# Patient Record
Sex: FEMALE | Race: WHITE | Employment: OTHER | URBAN - METROPOLITAN AREA
[De-identification: names, ages, dates, MRNs, and addresses within clinical notes are randomized per-mention and may not be internally consistent; named-entity substitution may affect disease eponyms.]

---

## 2017-01-02 ENCOUNTER — ALLIED HEALTH/NURSE VISIT (OUTPATIENT)
Dept: SURGERY | Facility: CLINIC | Age: 72
End: 2017-01-02

## 2017-01-02 ENCOUNTER — OFFICE VISIT (OUTPATIENT)
Dept: SURGERY | Facility: CLINIC | Age: 72
End: 2017-01-02

## 2017-01-02 ENCOUNTER — ANESTHESIA EVENT (OUTPATIENT)
Dept: SURGERY | Facility: CLINIC | Age: 72
DRG: 326 | End: 2017-01-02
Payer: MEDICARE

## 2017-01-02 VITALS
RESPIRATION RATE: 12 BRPM | HEIGHT: 60 IN | HEART RATE: 72 BPM | BODY MASS INDEX: 19.67 KG/M2 | OXYGEN SATURATION: 96 % | TEMPERATURE: 98.3 F | SYSTOLIC BLOOD PRESSURE: 113 MMHG | DIASTOLIC BLOOD PRESSURE: 72 MMHG | WEIGHT: 100.2 LBS

## 2017-01-02 DIAGNOSIS — Z01.818 PREOPERATIVE EXAMINATION: ICD-10-CM

## 2017-01-02 DIAGNOSIS — Z01.818 PREOPERATIVE EXAMINATION: Primary | ICD-10-CM

## 2017-01-02 DIAGNOSIS — K22.3 ESOPHAGEAL PERFORATION: ICD-10-CM

## 2017-01-02 LAB
ALBUMIN SERPL-MCNC: 3.9 G/DL (ref 3.4–5)
ANION GAP SERPL CALCULATED.3IONS-SCNC: 5 MMOL/L (ref 3–14)
BASOPHILS # BLD AUTO: 0.1 10E9/L (ref 0–0.2)
BASOPHILS NFR BLD AUTO: 0.4 %
BUN SERPL-MCNC: 24 MG/DL (ref 7–30)
CALCIUM SERPL-MCNC: 9.4 MG/DL (ref 8.5–10.1)
CHLORIDE SERPL-SCNC: 100 MMOL/L (ref 94–109)
CO2 SERPL-SCNC: 31 MMOL/L (ref 20–32)
CREAT SERPL-MCNC: 0.53 MG/DL (ref 0.52–1.04)
DIFFERENTIAL METHOD BLD: ABNORMAL
EOSINOPHIL # BLD AUTO: 0.2 10E9/L (ref 0–0.7)
EOSINOPHIL NFR BLD AUTO: 2 %
ERYTHROCYTE [DISTWIDTH] IN BLOOD BY AUTOMATED COUNT: 14.9 % (ref 10–15)
GFR SERPL CREATININE-BSD FRML MDRD: NORMAL ML/MIN/1.7M2
GLUCOSE SERPL-MCNC: 92 MG/DL (ref 70–99)
HCT VFR BLD AUTO: 37.8 % (ref 35–47)
HGB BLD-MCNC: 12.1 G/DL (ref 11.7–15.7)
IMM GRANULOCYTES # BLD: 0 10E9/L (ref 0–0.4)
IMM GRANULOCYTES NFR BLD: 0.3 %
LYMPHOCYTES # BLD AUTO: 1.5 10E9/L (ref 0.8–5.3)
LYMPHOCYTES NFR BLD AUTO: 13.2 %
MCH RBC QN AUTO: 29.2 PG (ref 26.5–33)
MCHC RBC AUTO-ENTMCNC: 32 G/DL (ref 31.5–36.5)
MCV RBC AUTO: 91 FL (ref 78–100)
MONOCYTES # BLD AUTO: 0.9 10E9/L (ref 0–1.3)
MONOCYTES NFR BLD AUTO: 7.6 %
NEUTROPHILS # BLD AUTO: 8.9 10E9/L (ref 1.6–8.3)
NEUTROPHILS NFR BLD AUTO: 76.5 %
NRBC # BLD AUTO: 0 10*3/UL
NRBC BLD AUTO-RTO: 0 /100
PLATELET # BLD AUTO: 433 10E9/L (ref 150–450)
POTASSIUM SERPL-SCNC: 3.6 MMOL/L (ref 3.4–5.3)
PREALB SERPL IA-MCNC: 22 MG/DL (ref 15–45)
RBC # BLD AUTO: 4.15 10E12/L (ref 3.8–5.2)
SODIUM SERPL-SCNC: 136 MMOL/L (ref 133–144)
WBC # BLD AUTO: 11.6 10E9/L (ref 4–11)

## 2017-01-02 RX ORDER — DIPHENHYDRAMINE HCL 25 MG
50 CAPSULE ORAL
Status: ON HOLD | COMMUNITY
End: 2017-01-25

## 2017-01-02 RX ORDER — NUT.TX.COMP. IMMUNE SYSTM,REG 0.09 G-1.5
LIQUID (ML) ORAL AT BEDTIME
Status: ON HOLD | COMMUNITY
End: 2017-01-25

## 2017-01-02 RX ORDER — CELECOXIB 100 MG/1
200 CAPSULE ORAL ONCE
Status: CANCELLED | OUTPATIENT
Start: 2017-01-02 | End: 2017-01-02

## 2017-01-02 RX ORDER — ACETAMINOPHEN 325 MG/1
975 TABLET ORAL ONCE
Status: CANCELLED | OUTPATIENT
Start: 2017-01-02 | End: 2017-01-02

## 2017-01-02 RX ORDER — IBUPROFEN 100 MG/5ML
10 SUSPENSION, ORAL (FINAL DOSE FORM) ORAL EVERY 6 HOURS PRN
Status: ON HOLD | COMMUNITY
End: 2017-01-25

## 2017-01-02 RX ORDER — GABAPENTIN 300 MG/1
300 CAPSULE ORAL ONCE
Status: CANCELLED | OUTPATIENT
Start: 2017-01-02 | End: 2017-01-02

## 2017-01-02 ASSESSMENT — LIFESTYLE VARIABLES: TOBACCO_USE: 1

## 2017-01-02 NOTE — PATIENT INSTRUCTIONS
Preparing for Your Surgery      Name:  Minerva Blanco   MRN:  [id number]   :  1945   Today's Date:  2017     Arriving for surgery:  Surgery date:  2017  Surgery time: 730 am  Arrival time:  530 am    Please come to:       [facility] [address]  [address] [place]  [address]    -   parking is available in front of the hospital from 5:15 am to 8:00 pm    -  Stop at the Information Desk in the lobby    -   Inform the information person that you are here for surgery. An escort to 3c will be provided. If you would not like an escort, please proceed to 3C on the 3rd floor. [phone]     What can I eat or drink?  -  You may have solid food or milk products until 8 hours prior to your surgery; until 12 midnight on ; drink 8-12 ounces of gatorade the evening of   -  You may have water, apple juice or 7up/Sprite until 2 hours prior to your surgery; until 515 am on ; please drink another 8-12 ounces of gatorade on the way to the hospital on     Which medicines can I take?        -  Please take only these medications the day of surgery:  Pain meds if needed, pantoprazole      How do I prepare myself?  -  Take two showers: one the night before surgery; and one the morning of surgery.         Use Scrubcare or Hibiclens to wash from neck down.  You may use your own shampoo and conditioner. No other hair products.   -  Do NOT use lotion, powder, deodorant, or antiperspirant the day of your surgery.  -  Do NOT wear any makeup, fingernail polish or jewelry.  -  Begin using Incentive Spirometer 1 week prior to surgery.  Use 4 times per day, up to 5-10 breaths each time.  Bring Incentive Spirometer to hospital.  -Do not bring your own medications to the hospital, except for inhalers and eye drops.  -  Bring your ID and insurance card.    Questions or Concerns:  If you have questions or concerns, please call the  Preoperative Assessment Center,  Monday-Friday 7AM-7PM:  [phone]    AFTER YOUR SURGERY  Breathing exercises   Breathing exercises help you recover faster. Take deep breaths and let the air out slowly. This will:     Help you wake up after surgery.    Help prevent complications like pneumonia.  Preventing complications will help you go home sooner.   We may give you a breathing device (incentive spirometer) to encourage you to breathe deeply.   Nausea and vomiting   You may feel sick to your stomach after surgery; if so, let your nurse know.    Pain control:  After surgery, you may have pain. Our goal is to help you manage your pain. Pain medicine will help you feel comfortable enough to do activities that will help you heal.  These activities may include breathing exercises, walking and physical therapy.   To help your health care team treat your pain we will ask: 1) If you have pain  2) where it is located 3) describe your pain in your words  Methods of pain control include medications given by mouth, vein or by nerve block for some surgeries.  We may give you a pain control pump that will:  1) Deliver the medicine through a tube placed in your vein  2) Control the amount of medicine you receive  3) Allow you to push a button to deliver a dose of pain medicine  Sequential Compression Device (SCD) or Pneumo Boots:  You may need to wear SCD S on your legs or feet. These are wraps connected to a machine that pumps in air and releases it. The repeated pumping helps prevent blood clots from forming.   Enhanced Recovery After Surgery     This is a team effort, including you, to get you back on your feet, eating and drinking normally and out of the hospital as quickly as possible.  The goals are: 1) NO INFECTIONS and   2) RETURN TO NORMAL DIET    How can we achieve these goals?  1) STAY ACTIVE: Walk every day before your surgery; try to increase the amount every day.  Walk after surgery as much as you can-the nurses will help you.  Walking speeds  healing and gets you home quicker, you heal better at home and have less risk of infection.     2) INCENTIVE SPIROMETER: Practice your incentive spirometer 4 times per day with 5-10 repetitions each time.  Using the incentive spirometer can strengthen your muscles between your ribs and help you have a strong cough after surgery.  A more effective cough can help prevent problems with your lungs.    3) STAY HYDRATED: Drink clear liquids up until 2 hours before your surgery. We would like you to purchase a drink such as Gatorade.  Drink this before bedtime and on the way into the hospital, drink between 8-12 ounces or until you feel hydrated.  Keeping well hydrated leads to your veins being plump, you wake up faster, and you are less likely to be nauseated. Start drinking water as soon as you can after surgery and advance to clear liquids and food as tolerated.  IV fluids contain salt, drinking fluids will minimize the amount of IV fluids you need and decrease the amount of salt you get.     4) PAIN MANAGEMENT: If we minimize the amount of opioids and narcotics, and use regional blocks (which numb the area where your surgery is) along with oral pain medications; you will have less side effects of nausea and constipation. Narcotics can slow down your bowels and cause you to stay in the hospital longer.     Our goal is to keep you comfortable; eating and drinking normally and back home safely.     Using an Incentive Spirometer  Soon after your surgery, a nurse or therapist will teach you breathing exercises. These keep your lungs clear, strengthen your breathing muscles, and help prevent complications.  The exercises include doing a deep-breathing exercise using a device called an incentive spirometer.  To do these exercises, you will breathe in through your mouth and not your nose. The incentive spirometer only works correctly if you breathe in through your mouth.  Four steps to clear lungs     Deep breathing expands  the lungs, aids circulation, and helps prevent pneumonia.   1. Exhale normally.    Relax and breathe out.  2. Place your lips tightly around the mouthpiece.    Make sure the device is upright and not tilted.  3. Inhale as much air as you can through the mouthpiece (don't breath through your nose).    Inhale slowly and deeply.    Hold your breath long enough to keep the balls or disk raised for at least 3 seconds.    If you re inhaling too quickly, your device may make a tone. If you hear this tone, inhale more slowly.  4. Repeat the exercise regularly.    Do this exercise every hour while you're awake, or as your health care provider instructs.    You will also be taught coughing exercises and be asked to do them regularly on your own.    7666-5520 [employer]. [address] [place] [address], [place] [address]. All rights reserved. This information is not intended as a substitute for professional medical care. Always follow your healthcare professional's instructions.

## 2017-01-02 NOTE — ANESTHESIA PREPROCEDURE EVALUATION
Anesthesia Evaluation     . Pt has had prior anesthetic. Type: General and MAC    No history of anesthetic complications     ROS/MED HX    ENT/Pulmonary:     (+)tobacco use, Current use 1 packs/day  , . .    Neurologic:     (+)Multiple Sclerosis limitations: heat and noise sensitivity,     Cardiovascular:     (+) ----. : . . . :. . Previous cardiac testing date:results:date: results:ECG reviewed date:8/25/2016 results:NSR date: results:          METS/Exercise Tolerance:  3 - Able to walk 1-2 blocks without stopping   Hematologic:     (+) Anemia, History of Transfusion no previous transfusion reaction -     (-) history of blood clots   Musculoskeletal: Comment: Fibromyalgia - generalized body aches  (+) arthritis, , , -       GI/Hepatic:     (+) GERD (asymptomatic currently, stopped protonix 3 months ago) Other, hiatal hernia, Inflammatory bowel disease, Other GI/Hepatic chronic esophageal perforation      Renal/Genitourinary:  - ROS Renal section negative       Endo:  - neg endo ROS       Psychiatric:  - neg psychiatric ROS       Infectious Disease:  - neg infectious disease ROS       Malignancy:   (+) Malignancy History of Breast  Breast CA Remission status post Surgery, Chemo and Radiation.         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  No vision impairment now.   (+) H/O Chronic Pain,H/O chronic opiod use ,              Physical Exam  Normal systems: cardiovascular and pulmonary    Airway   Mallampati: II  TM distance: >3 FB  Neck ROM: full    Dental   Comment: 2 missing left lower teeth    Cardiovascular   Rhythm and rate: regular and normal      Pulmonary    breath sounds clear to auscultation    Other findings: LUQ GJ tube  LUQ abdominal drain  Left pupil pinpoint s/p optic neuritis several years ago           PAC Discussion and Assessment    ASA Classification: 3  Case is suitable for:   Anesthetic techniques and relevant risks discussed: GA  Invasive  monitoring and risk discussed: No  Types:   Possibility and Risk of blood transfusion discussed: Yes  NPO instructions given:   Additional anesthetic preparation and risks discussed:   Needs early admission to pre-op area:   Other:     PAC Resident/NP Anesthesia Assessment:  Minerva Blanco is a 71 year old female scheduled for a laparoscopy left thoracotomy, distal esophagectomy, proximal gastrectomy and a spit fistula on 1/9/2017 by Dr. Wise in treatment of a chronic esophageal perforaion.  PAC referral for risk assessment and optimization for anesthesia with comorbid conditions of:  anemia, multiple sclerosis, fibromyalgia, IBS, hiatal hernia, GERD, and history of breast cancer.     Pre-operative considerations:  1.  Cardiac:  Functional status- METS 3. Patient reports some deconditioning over the last year, but is able to walk a couple blocks with no complications.  High risk surgery with 0.9% risk of major adverse cardiac event.   2.  Pulm:  Airway feasible.  CHARLIE risk: low.  Quit smoking in 1998.  3.  GI:  Risk of PONV score = 2.  If > 2, anti-emetic intervention recommended. GERD well controlled with no medication currently as she quit taking her protonix in a few months ago.  Denies chronic abdominal pain.  Has been on tube feedings since April 2016.    4.  Neuro:  +multiple sclerosis, but no current significant limitations.  Monitor for any signs of a MS flare post-op.   5. Musculoskeletal:  +fibromyalgia - reports generalized body aches; no specific medications needed.   6.  Pain:  Has oral oxycodone that is used rarely now.  Was taking it more regularly status post dental extractions a couple weeks ago, but no longer using regularly.   7. ENT:  Patient is deaf in the left ear from Meniere's disease.  Speak towards the right ear.    VTE risk:    Patient is optimized and is acceptable candidate for the proposed procedure.  No further diagnostic evaluation is needed.       Thoracic ERAS pathway  initiated      Patient also evaluated by Dr. Enriquez. See recommendations below.     For further details of assessment, testing, and physical exam please see H and P completed on same date.          Felicia Garcia DNP, RN, APRN        Reviewed and Signed by PAC Mid-Level Provider/Resident  Mid-Level Provider/Resident: Felicia Garcia DNP, RN, APRN  Date: 1/2/2017  Time: 1046    Attending Anesthesiologist Anesthesia Assessment:  STAFF:  I have seen this patient, discussed his preoperative evaluation in detail with the nurse practitioner in PAC, and agree with the comprehensive anesthetic evaluation summarized above.  Instructions given and questions answered.  The final anesthetic plan will be determined by anesthesiology team on DOS.    Anaya Enriquez MD  01/02/2017      Anesthesiologist:   Date:   Time:   Pass/Fail: Pass  Disposition:     PAC Pharmacist Assessment:        Pharmacist:   Date:   Time:      Anesthesia Plan      History & Physical Review      ASA Status:  3 .        Plan for General, ETT and Periph. Nerve Block for postop pain with Intravenous and Propofol induction. Maintenance will be Balanced.    PONV prophylaxis:  Ondansetron (or other 5HT-3) and Dexamethasone or Solumedrol  Additional equipment: Double Lumen ETT, 2nd IV and Arterial Line      Postoperative Care  Postoperative pain management:  Multi-modal analgesia.      Consents                          .

## 2017-01-02 NOTE — MR AVS SNAPSHOT
After Visit Summary   2017    Minerva Blanco    MRN: [id number]           Patient Information     Date Of Birth          1945        Visit Information        Provider Department      2017 9:30 AM Rn, [facility]        Care Instructions    Preparing for Your Surgery      Name:  Minerva Blanco   MRN:  [id number]   :  1945   Today's Date:  2017     Arriving for surgery:  Surgery date:  2017  Surgery time: 730 am  Arrival time:  530 am    Please come to:       [facility] [address]  [address] [place]  [address]    -   parking is available in front of the hospital from 5:15 am to 8:00 pm    -  Stop at the Information Desk in the lobby    -   Inform the information person that you are here for surgery. An escort to 3c will be provided. If you would not like an escort, please proceed to 3C on the 3rd floor. [phone]     What can I eat or drink?  -  You may have solid food or milk products until 8 hours prior to your surgery; until 12 midnight on ; drink 8-12 ounces of gatorade the evening of   -  You may have water, apple juice or 7up/Sprite until 2 hours prior to your surgery; until 515 am on ; please drink another 8-12 ounces of gatorade on the way to the hospital on     Which medicines can I take?        -  Please take only these medications the day of surgery:  Pain meds if needed, pantoprazole      How do I prepare myself?  -  Take two showers: one the night before surgery; and one the morning of surgery.         Use Scrubcare or Hibiclens to wash from neck down.  You may use your own shampoo and conditioner. No other hair products.   -  Do NOT use lotion, powder, deodorant, or antiperspirant the day of your surgery.  -  Do NOT wear any makeup, fingernail polish or jewelry.  -  Begin using Incentive Spirometer 1 week prior to surgery.  Use 4 times per day, up to 5-10  breaths each time.  Bring Incentive Spirometer to hospital.  -Do not bring your own medications to the hospital, except for inhalers and eye drops.  -  Bring your ID and insurance card.    Questions or Concerns:  If you have questions or concerns, please call the  Preoperative Assessment Center, Monday-Friday 7AM-7PM:  [phone]    AFTER YOUR SURGERY  Breathing exercises   Breathing exercises help you recover faster. Take deep breaths and let the air out slowly. This will:     Help you wake up after surgery.    Help prevent complications like pneumonia.  Preventing complications will help you go home sooner.   We may give you a breathing device (incentive spirometer) to encourage you to breathe deeply.   Nausea and vomiting   You may feel sick to your stomach after surgery; if so, let your nurse know.    Pain control:  After surgery, you may have pain. Our goal is to help you manage your pain. Pain medicine will help you feel comfortable enough to do activities that will help you heal.  These activities may include breathing exercises, walking and physical therapy.   To help your health care team treat your pain we will ask: 1) If you have pain  2) where it is located 3) describe your pain in your words  Methods of pain control include medications given by mouth, vein or by nerve block for some surgeries.  We may give you a pain control pump that will:  1) Deliver the medicine through a tube placed in your vein  2) Control the amount of medicine you receive  3) Allow you to push a button to deliver a dose of pain medicine  Sequential Compression Device (SCD) or Pneumo Boots:  You may need to wear SCD S on your legs or feet. These are wraps connected to a machine that pumps in air and releases it. The repeated pumping helps prevent blood clots from forming.   Enhanced Recovery After Surgery     This is a team effort, including you, to get you back on your feet, eating and drinking normally and out of the hospital  as quickly as possible.  The goals are: 1) NO INFECTIONS and   2) RETURN TO NORMAL DIET    How can we achieve these goals?  1) STAY ACTIVE: Walk every day before your surgery; try to increase the amount every day.  Walk after surgery as much as you can-the nurses will help you.  Walking speeds healing and gets you home quicker, you heal better at home and have less risk of infection.     2) INCENTIVE SPIROMETER: Practice your incentive spirometer 4 times per day with 5-10 repetitions each time.  Using the incentive spirometer can strengthen your muscles between your ribs and help you have a strong cough after surgery.  A more effective cough can help prevent problems with your lungs.    3) STAY HYDRATED: Drink clear liquids up until 2 hours before your surgery. We would like you to purchase a drink such as Gatorade.  Drink this before bedtime and on the way into the hospital, drink between 8-12 ounces or until you feel hydrated.  Keeping well hydrated leads to your veins being plump, you wake up faster, and you are less likely to be nauseated. Start drinking water as soon as you can after surgery and advance to clear liquids and food as tolerated.  IV fluids contain salt, drinking fluids will minimize the amount of IV fluids you need and decrease the amount of salt you get.     4) PAIN MANAGEMENT: If we minimize the amount of opioids and narcotics, and use regional blocks (which numb the area where your surgery is) along with oral pain medications; you will have less side effects of nausea and constipation. Narcotics can slow down your bowels and cause you to stay in the hospital longer.     Our goal is to keep you comfortable; eating and drinking normally and back home safely.     Using an Incentive Spirometer  Soon after your surgery, a nurse or therapist will teach you breathing exercises. These keep your lungs clear, strengthen your breathing muscles, and help prevent complications.  The exercises include doing  a deep-breathing exercise using a device called an incentive spirometer.  To do these exercises, you will breathe in through your mouth and not your nose. The incentive spirometer only works correctly if you breathe in through your mouth.  Four steps to clear lungs     Deep breathing expands the lungs, aids circulation, and helps prevent pneumonia.   1. Exhale normally.    Relax and breathe out.  2. Place your lips tightly around the mouthpiece.    Make sure the device is upright and not tilted.  3. Inhale as much air as you can through the mouthpiece (don't breath through your nose).    Inhale slowly and deeply.    Hold your breath long enough to keep the balls or disk raised for at least 3 seconds.    If you re inhaling too quickly, your device may make a tone. If you hear this tone, inhale more slowly.  4. Repeat the exercise regularly.    Do this exercise every hour while you're awake, or as your health care provider instructs.    You will also be taught coughing exercises and be asked to do them regularly on your own.    5508-2272 [employer]. [address] [place] [address]. All rights reserved. This information is not intended as a substitute for professional medical care. Always follow your healthcare professional's instructions.                Follow-ups after your visit        Your next 10 appointments already scheduled     Jan 02, 2017 10:00 AM   (Arrive by 9:45 AM)   PAC EVALUATION with  Pac Sarah 8   [facility] [place] ([facility])    [address] [place] [id number]   [phone]            Jan 02, 2017 11:00 AM   (Arrive by 10:45 AM)   PAC Anesthesia Consult with  Pac Anesthesiologist   [facility] Preoperative Assessment [place] ([facility])    [address] [place] [address]   [phone]            Jan 02, 2017 11:15 AM   LAB with RACHELLE LAB   [facility] Lab  ([facility])    [address] [place] [address]  [address] [facility] [id number] [phone]           Patient must bring picture ID.  Patient should be prepared to give a urine specimen  Please do not eat 10-12 hours before your appointment if you are coming in fasting for labs on lipids, cholesterol, or glucose (sugar).  Pregnant women should follow their Care Team instructions. Water with medications is okay. Do not drink coffee or other fluids.   If you have concerns about taking  your medications, please ask at office or if scheduling via [employer], send a message by clicking on Secure Messaging, Message Your Care Team.            Jan 09, 2017   Procedure with Jens Wise MD   [facility] [place], Same Day Surgery (--)    [address] [facility] [address] [phone]            Mar 06, 2017  2:20 PM   (Arrive by 2:05 PM)   New Patient Visit with Roverto Adams MD   Gastroenterology and IBD ([facility])    [address] [place] [address]  [address] [facility] [id number] [phone]              Future tests that were ordered for you today     Open Future Orders        Priority Expected Expires Ordered    ABO/Rh type and screen Routine 1/2/2017 2/1/2017 1/2/2017    Basic metabolic panel Routine 1/2/2017 2/1/2017 1/2/2017    CBC with platelets differential Routine 1/2/2017 2/1/2017 1/2/2017            Who to contact     Please call your clinic at [phone] to:    Ask questions about your health    Make or cancel appointments    Discuss your medicines    Learn about your test results    Speak to your doctor   If you have compliments or concerns about an experience at your clinic, or if you wish to file a complaint, please contact [facility] Patient Relations at [phone] or email us at [email or web address]         Additional Information About Your Visit        [employer] Information     [employer] is an electronic gateway  that provides easy, online access to your medical records. With Patient Home Monitoring, you can request a clinic appointment, read your test results, renew a prescription or communicate with your care team.     To sign up for Patient Home Monitoring visit the website at [email or web address]   You will be asked to enter the access code listed below, as well as some personal information. Please follow the directions to create your username and password.     Your access code is: [email or web address]  Expires: 2017  6:30 AM     Your access code will  in 90 days. If you need help or a new code, please contact your [facility] or call [phone] for assistance.         Blood Pressure from Last 3 Encounters:   16 139/75   10/20/16 118/70   10/20/16 118/70    Weight from Last 3 Encounters:   16 43.591 kg (96 lb 1.6 oz)   10/20/16 44.9 kg (98 lb 15.8 oz)   10/20/16 44.09 kg (97 lb 3.2 oz)              Today, you had the following     No orders found for display         Today's Medication Changes          These changes are accurate as of: 17  9:45 AM.  If you have any questions, ask your nurse or doctor.               These medicines have changed or have updated prescriptions.        Dose/Directions    zolpidem 5 MG tablet   Commonly known as:  AMBIEN   This may have changed:  how much to take   Used for:  Insomnia due to medical condition        Dose:  5 mg   Take 1 tablet (5 mg) by mouth nightly as needed for sleep   Quantity:  30 tablet   Refills:  0                Primary Care Provider Office Phone # Fax #    Andrea Nino [phone]       [facility] [address] [address]        Thank you!     Thank you for choosing [facility]  for your care. Our goal is always to provide you with excellent care. Hearing back from our patients is one way we can continue to improve our services. Please take a few minutes to complete the  written survey that you may receive in the mail after your visit with us. Thank you!             Your Updated Medication List - Protect others around you: Learn how to safely use, store and throw away your medicines at www.disposemymeds.org.          This list is accurate as of: 1/2/17  9:45 AM.  Always use your most recent med list.                   Brand Name Dispense Instructions for use    BENADRYL 25 MG capsule   Generic drug:  diphenhydrAMINE      Take 50 mg by mouth nightly as needed for itching or allergies       CULTURELLE PO      Take 1 tablet by mouth At Bedtime       ibuprofen 100 MG/5ML suspension    ADVIL/MOTRIN     Take 10 mg/kg by mouth every 6 hours as needed for fever or moderate pain       IMODIUM OR      Take 15 mLs by mouth At Bedtime       IMPACT PEPTIDE 1.5 Liqd      Take by mouth At Bedtime 4-5 CARTONS       oxyCODONE 5 MG/5ML solution    ROXICODONE    1500 mL    Take 15--20mg q 4 hours PRN pain       traZODone 100 MG tablet    DESYREL    30 tablet    Take 1 tablet (100 mg) by mouth At Bedtime       zolpidem 5 MG tablet    AMBIEN    30 tablet    Take 1 tablet (5 mg) by mouth nightly as needed for sleep

## 2017-01-03 DIAGNOSIS — K22.3 ESOPHAGEAL PERFORATION: Primary | ICD-10-CM

## 2017-01-04 ENCOUNTER — RECORDS - HEALTHEAST (OUTPATIENT)
Dept: ADMINISTRATIVE | Facility: OTHER | Age: 72
End: 2017-01-04

## 2017-01-04 ENCOUNTER — OFFICE VISIT (OUTPATIENT)
Dept: SURGERY | Facility: CLINIC | Age: 72
End: 2017-01-04
Attending: THORACIC SURGERY (CARDIOTHORACIC VASCULAR SURGERY)
Payer: MEDICARE

## 2017-01-04 VITALS
OXYGEN SATURATION: 100 % | HEART RATE: 79 BPM | HEIGHT: 60 IN | RESPIRATION RATE: 18 BRPM | WEIGHT: 101.3 LBS | TEMPERATURE: 97.8 F | SYSTOLIC BLOOD PRESSURE: 137 MMHG | BODY MASS INDEX: 19.89 KG/M2 | DIASTOLIC BLOOD PRESSURE: 77 MMHG

## 2017-01-04 DIAGNOSIS — K21.9 GASTROESOPHAGEAL REFLUX DISEASE WITHOUT ESOPHAGITIS: ICD-10-CM

## 2017-01-04 DIAGNOSIS — K22.3 PERFORATION ESOPHAGUS: Primary | ICD-10-CM

## 2017-01-04 DIAGNOSIS — K22.3 ESOPHAGEAL PERFORATION: ICD-10-CM

## 2017-01-04 DIAGNOSIS — K22.2 ESOPHAGEAL STRICTURE: ICD-10-CM

## 2017-01-04 DIAGNOSIS — Z98.890 S/P LAPAROSCOPIC FUNDOPLICATION: ICD-10-CM

## 2017-01-04 PROCEDURE — 99212 OFFICE O/P EST SF 10 MIN: CPT | Mod: ZF

## 2017-01-04 PROCEDURE — 99212 OFFICE O/P EST SF 10 MIN: CPT

## 2017-01-04 ASSESSMENT — PAIN SCALES - GENERAL: PAINLEVEL: NO PAIN (0)

## 2017-01-04 NOTE — Clinical Note
1/4/2017       RE: Minerva Blanco  [address]  [place] [address]     Dear Colleague,    Thank you for referring your patient, Minerva Blanco, to the [facility]. Please see a copy of my visit note below.       THORACIC SURGERY ESTABLISHED PATIENT OFFICE VISIT    Dear Dr. Carroll,  I saw Mrs. Minerva Blanco in follow-up today. The clinical summary follows:      PREOP DIAGNOSIS    GERD  S/P Toupet fundoplication (1/2016)  Chronic esophageal perforation  PROCEDURE   1) Multiple endoscopies and pharyngostomy tube placement x 2 (for drainage of paraesophageal cavity)  2) Esophageal stent placement, attempted placement of biliary stent into the paraesophageal cavity (7/22/2016)  3) Esophageal stent removal, placement of retrograde gastroesophageal tube (10/23/2016)    INTERVAL STUDIES  CTA abdomen 11/23/2016: no cavity but significant periesophageal tissue swelling, retrograde esophageal tube in place.   TOB neg  ETOH neg  BMI 18    SUBJECTIVE      IMPRESSION    (K22.3) Esophageal perforation  (primary encounter diagnosis)  (K22.2) Esophageal stricture    71 year-old female with a chronic esophageal perforation and stricture      PLAN  I spent a total of  *** minutes with Mrs. Minerva Blanco and her , Enrique, more than ***% of which were spent in counseling, coordination of care, and face-to-face time. I reviewed the plan as follows:  1) Procedure planned: distal esophagectomy, proximal gastrectomy, spit fistula (laparoscopy/left thoracotomy). We will plan to restore GI continuity about 3 months afterwards. I reviewed the indications, risks, and benefits of the procedure with Ms. Minerva Blanco and her , Enrique.   2) PAC, social work consult for likely rehabilitation facility stay after surgery  3) EPIDURAL      They had all their questions answered and were in agreement with the plan.  I appreciate the opportunity to participate in the care of your  patient and will keep you updated.               Again, thank you for allowing me to participate in the care of your patient.      Sincerely,    Jens Wise MD

## 2017-01-04 NOTE — Clinical Note
1/4/2017      RE: Minerva Blanco  [address]  [place] [address]       THORACIC SURGERY ESTABLISHED PATIENT OFFICE VISIT    Dear Dr. Carroll,  I saw Mrs. Minerva Blanco in follow-up today. The clinical summary follows:      PREOP DIAGNOSIS    GERD  S/P Toupet fundoplication (1/2016)  Chronic esophageal perforation  Esophageal stricture  PROCEDURE   1) Multiple endoscopies and pharyngostomy tube placement x 2 (for drainage of paraesophageal cavity)  2) Esophageal stent placement, attempted placement of biliary stent into the paraesophageal cavity (7/22/2016)  3) Esophageal stent removal, placement of retrograde gastroesophageal tube (10/23/2016)    INTERVAL STUDIES  CT chest/abdomen/pelvis today showing stable distal esophageal changes  TOB neg  ETOH neg  BMI 19.78    SUBJECTIVE  Patient is overall doing well. She has gained some weight. She reports persistent discomfort and some leaking from her feeding tube.     IMPRESSION    (K22.3) Perforation esophagus  (primary encounter diagnosis)  (K21.9) Gastroesophageal reflux disease without esophagitis  (Z98.890) S/P laparoscopic fundoplication  (K22.2) Esophageal stricture    71 year-old female with a chronic esophageal perforation and stricture  S/P Toupet for GERD      PLAN  I spent a total of  25 minutes with Mrs. Minerva Blanco and her , Enrique, more than 50% of which were spent in counseling, coordination of care, and face-to-face time. I reviewed the plan as follows:  1) Procedure planned: Procedure planned: esophagectomy and spit fistula via laparoscopy possible laparotomy, left video assisted thorascopic surgery possible left thoracotomy, possible right video assisted thorascopic surgery possible right thoracotomy; jejunal feeding tube replacement. I reviewed the indications, risks, and benefits of the procedure with Ms. Minerva Blanco and her . They understand that her gastrointestinal tract will be in  discontinuity at the conclusion of the procedure. We discussed the intraoperative risks of bleeding, injury to vital organs, and potential for esophageal discontinuity.  Potential postoperative complications include, but are not limited to, major respiratory events, arrhythmia, bleeding, infection, reoperation, anastomotic leak, conduit necrosis with discontinuity, vocal cord palsy, and death. I explained the anticipated hospital course (10-14 days) and postoperative recovery including pain control, tube feedings, dietary changes, and overall drain management. We discussed the importance of lifetime awareness to limit lifting heavy weights to prevent hiatal herniation as well as the need for aspiration precautions.   2) PAC, social work consult for likely rehabilitation facility stay after surgery  3) EPIDURAL + pain consult  They had all their questions answered and were in agreement with the plan.  I appreciate the opportunity to participate in the care of your patient and will keep you updated.        Jens Wise MD

## 2017-01-04 NOTE — NURSING NOTE
Minerva Blanco is a 71 year old female who presents for:  Chief Complaint   Patient presents with     Oncology Clinic Visit     Esophageal Perf        Initial Vitals:  /77 mmHg  Pulse 79  Temp(Src) 97.8  F (36.6  C) (Oral)  Resp 18  Ht 1.524 m (5')  Wt 45.949 kg (101 lb 4.8 oz)  BMI 19.78 kg/m2  SpO2 100% Estimated body mass index is 19.78 kg/(m^2) as calculated from the following:    Height as of this encounter: 1.524 m (5').    Weight as of this encounter: 45.949 kg (101 lb 4.8 oz).. Body surface area is 1.39 meters squared. BP completed using cuff size: small regular  No Pain (0) No LMP recorded. Patient is postmenopausal. Allergies and medications reviewed.     Medications: Medication refills not needed today.  Pharmacy name entered into EPIC:    [facility] - [place], [place] - [address] [id number] - [place] - [address] [facility] [address] [place] [facility] - [place], [place] - [address] [place] [address]    Comments: Patient denied pain.    6 minutes for nursing intake (face to face time)   Roxanne Garcia LPN

## 2017-01-04 NOTE — PROGRESS NOTES
THORACIC SURGERY ESTABLISHED PATIENT OFFICE VISIT    Dear Dr. Carroll,  I saw Mrs. Minerva Blanco in follow-up today. The clinical summary follows:      PREOP DIAGNOSIS    GERD  S/P Toupet fundoplication (1/2016)  Chronic esophageal perforation  Esophageal stricture  PROCEDURE   1) Multiple endoscopies and pharyngostomy tube placement x 2 (for drainage of paraesophageal cavity)  2) Esophageal stent placement, attempted placement of biliary stent into the paraesophageal cavity (7/22/2016)  3) Esophageal stent removal, placement of retrograde gastroesophageal tube (10/23/2016)    INTERVAL STUDIES  CT chest/abdomen/pelvis today showing stable distal esophageal changes  TOB neg  ETOH neg  BMI 19.78    SUBJECTIVE  Patient is overall doing well. She has gained some weight. She reports persistent discomfort and some leaking from her feeding tube.     IMPRESSION    (K22.3) Perforation esophagus  (primary encounter diagnosis)  (K21.9) Gastroesophageal reflux disease without esophagitis  (Z98.890) S/P laparoscopic fundoplication  (K22.2) Esophageal stricture    71 year-old female with a chronic esophageal perforation and stricture  S/P Toupet for GERD      PLAN  I spent a total of  25 minutes with Mrs. Minerva Blanco and her , Enrique, more than 50% of which were spent in counseling, coordination of care, and face-to-face time. I reviewed the plan as follows:  1) Procedure planned: Procedure planned: esophagectomy and spit fistula via laparoscopy possible laparotomy, left video assisted thorascopic surgery possible left thoracotomy, possible right video assisted thorascopic surgery possible right thoracotomy; jejunal feeding tube replacement. I reviewed the indications, risks, and benefits of the procedure with Ms. Minerva Blanco and her . They understand that her gastrointestinal tract will be in discontinuity at the conclusion of the procedure. We discussed the intraoperative risks of bleeding, injury  to vital organs, and potential for esophageal discontinuity.  Potential postoperative complications include, but are not limited to, major respiratory events, arrhythmia, bleeding, infection, reoperation, anastomotic leak, conduit necrosis with discontinuity, vocal cord palsy, and death. I explained the anticipated hospital course (10-14 days) and postoperative recovery including pain control, tube feedings, dietary changes, and overall drain management. We discussed the importance of lifetime awareness to limit lifting heavy weights to prevent hiatal herniation as well as the need for aspiration precautions.   2) PAC, social work consult for likely rehabilitation facility stay after surgery  3) EPIDURAL + pain consult  They had all their questions answered and were in agreement with the plan.  I appreciate the opportunity to participate in the care of your patient and will keep you updated.

## 2017-01-09 ENCOUNTER — APPOINTMENT (OUTPATIENT)
Dept: GENERAL RADIOLOGY | Facility: CLINIC | Age: 72
DRG: 326 | End: 2017-01-09
Attending: THORACIC SURGERY (CARDIOTHORACIC VASCULAR SURGERY)
Payer: MEDICARE

## 2017-01-09 ENCOUNTER — ANESTHESIA (OUTPATIENT)
Dept: SURGERY | Facility: CLINIC | Age: 72
DRG: 326 | End: 2017-01-09
Payer: MEDICARE

## 2017-01-09 LAB
BLD PROD TYP BPU: NORMAL
BLD PROD TYP BPU: NORMAL
BLD UNIT ID BPU: 0
BLD UNIT ID BPU: 0
BLOOD PRODUCT CODE: NORMAL
BLOOD PRODUCT CODE: NORMAL
BPU ID: NORMAL
BPU ID: NORMAL
TRANSFUSION STATUS PATIENT QL: NORMAL

## 2017-01-09 PROCEDURE — 25000128 H RX IP 250 OP 636

## 2017-01-09 PROCEDURE — 25000125 ZZHC RX 250: Performed by: ANESTHESIOLOGY

## 2017-01-09 PROCEDURE — 25000125 ZZHC RX 250: Performed by: NURSE ANESTHETIST, CERTIFIED REGISTERED

## 2017-01-09 PROCEDURE — 25000125 ZZHC RX 250

## 2017-01-09 PROCEDURE — 25000125 ZZHC RX 250: Performed by: CLINICAL NURSE SPECIALIST

## 2017-01-09 PROCEDURE — 25800025 ZZH RX 258: Performed by: ANESTHESIOLOGY

## 2017-01-09 PROCEDURE — 25000128 H RX IP 250 OP 636: Performed by: ANESTHESIOLOGY

## 2017-01-09 PROCEDURE — 71010 XR CHEST PORT 1 VW: CPT

## 2017-01-09 PROCEDURE — P9041 ALBUMIN (HUMAN),5%, 50ML: HCPCS | Performed by: ANESTHESIOLOGY

## 2017-01-09 RX ORDER — NEOSTIGMINE METHYLSULFATE 1 MG/ML
VIAL (ML) INJECTION PRN
Status: DISCONTINUED | OUTPATIENT
Start: 2017-01-09 | End: 2017-01-09

## 2017-01-09 RX ORDER — ONDANSETRON 2 MG/ML
INJECTION INTRAMUSCULAR; INTRAVENOUS PRN
Status: DISCONTINUED | OUTPATIENT
Start: 2017-01-09 | End: 2017-01-09

## 2017-01-09 RX ORDER — SODIUM CHLORIDE 9 MG/ML
INJECTION, SOLUTION INTRAVENOUS CONTINUOUS PRN
Status: DISCONTINUED | OUTPATIENT
Start: 2017-01-09 | End: 2017-01-09

## 2017-01-09 RX ORDER — PROPOFOL 10 MG/ML
INJECTION, EMULSION INTRAVENOUS PRN
Status: DISCONTINUED | OUTPATIENT
Start: 2017-01-09 | End: 2017-01-09

## 2017-01-09 RX ORDER — FENTANYL CITRATE 50 UG/ML
INJECTION, SOLUTION INTRAMUSCULAR; INTRAVENOUS PRN
Status: DISCONTINUED | OUTPATIENT
Start: 2017-01-09 | End: 2017-01-09

## 2017-01-09 RX ORDER — ALBUMIN HUMAN 5 %
INTRAVENOUS SOLUTION INTRAVENOUS PRN
Status: DISCONTINUED | OUTPATIENT
Start: 2017-01-09 | End: 2017-01-09

## 2017-01-09 RX ORDER — EPHEDRINE SULFATE 50 MG/ML
INJECTION, SOLUTION INTRAMUSCULAR; INTRAVENOUS; SUBCUTANEOUS PRN
Status: DISCONTINUED | OUTPATIENT
Start: 2017-01-09 | End: 2017-01-09

## 2017-01-09 RX ORDER — SODIUM CHLORIDE, SODIUM LACTATE, POTASSIUM CHLORIDE, CALCIUM CHLORIDE 600; 310; 30; 20 MG/100ML; MG/100ML; MG/100ML; MG/100ML
INJECTION, SOLUTION INTRAVENOUS CONTINUOUS PRN
Status: DISCONTINUED | OUTPATIENT
Start: 2017-01-09 | End: 2017-01-09

## 2017-01-09 RX ORDER — GLYCOPYRROLATE 0.2 MG/ML
INJECTION, SOLUTION INTRAMUSCULAR; INTRAVENOUS PRN
Status: DISCONTINUED | OUTPATIENT
Start: 2017-01-09 | End: 2017-01-09

## 2017-01-09 RX ORDER — LIDOCAINE HYDROCHLORIDE 20 MG/ML
INJECTION, SOLUTION INFILTRATION; PERINEURAL PRN
Status: DISCONTINUED | OUTPATIENT
Start: 2017-01-09 | End: 2017-01-09

## 2017-01-09 RX ADMIN — ROCURONIUM BROMIDE 30 MG: 10 INJECTION INTRAVENOUS at 08:52

## 2017-01-09 RX ADMIN — Medication 5 MG: at 15:27

## 2017-01-09 RX ADMIN — PHENYLEPHRINE HYDROCHLORIDE 100 MCG: 10 INJECTION, SOLUTION INTRAMUSCULAR; INTRAVENOUS; SUBCUTANEOUS at 16:09

## 2017-01-09 RX ADMIN — ONDANSETRON 4 MG: 2 INJECTION INTRAMUSCULAR; INTRAVENOUS at 16:32

## 2017-01-09 RX ADMIN — HYDROMORPHONE HYDROCHLORIDE 0.2 MG: 1 INJECTION, SOLUTION INTRAMUSCULAR; INTRAVENOUS; SUBCUTANEOUS at 16:21

## 2017-01-09 RX ADMIN — CEFAZOLIN 1 G: 1 INJECTION, POWDER, FOR SOLUTION INTRAMUSCULAR; INTRAVENOUS at 16:11

## 2017-01-09 RX ADMIN — HYDROMORPHONE HYDROCHLORIDE 0.3 MG: 1 INJECTION, SOLUTION INTRAMUSCULAR; INTRAVENOUS; SUBCUTANEOUS at 09:06

## 2017-01-09 RX ADMIN — ROCURONIUM BROMIDE 20 MG: 10 INJECTION INTRAVENOUS at 09:28

## 2017-01-09 RX ADMIN — FENTANYL CITRATE 150 MCG: 50 INJECTION, SOLUTION INTRAMUSCULAR; INTRAVENOUS at 07:39

## 2017-01-09 RX ADMIN — CEFAZOLIN SODIUM 2 G: 2 INJECTION, SOLUTION INTRAVENOUS at 08:22

## 2017-01-09 RX ADMIN — ROCURONIUM BROMIDE 20 MG: 10 INJECTION INTRAVENOUS at 12:04

## 2017-01-09 RX ADMIN — ROCURONIUM BROMIDE 50 MG: 10 INJECTION INTRAVENOUS at 07:39

## 2017-01-09 RX ADMIN — PHENYLEPHRINE HYDROCHLORIDE 100 MCG: 10 INJECTION, SOLUTION INTRAMUSCULAR; INTRAVENOUS; SUBCUTANEOUS at 11:01

## 2017-01-09 RX ADMIN — ROCURONIUM BROMIDE 20 MG: 10 INJECTION INTRAVENOUS at 09:50

## 2017-01-09 RX ADMIN — LIDOCAINE HYDROCHLORIDE 100 MG: 20 INJECTION, SOLUTION INFILTRATION; PERINEURAL at 07:39

## 2017-01-09 RX ADMIN — HYDROMORPHONE HYDROCHLORIDE 0.5 MG: 1 INJECTION, SOLUTION INTRAMUSCULAR; INTRAVENOUS; SUBCUTANEOUS at 15:51

## 2017-01-09 RX ADMIN — Medication 5 MG: at 13:46

## 2017-01-09 RX ADMIN — PHENYLEPHRINE HYDROCHLORIDE 100 MCG: 10 INJECTION, SOLUTION INTRAMUSCULAR; INTRAVENOUS; SUBCUTANEOUS at 11:04

## 2017-01-09 RX ADMIN — PROPOFOL 120 MG: 10 INJECTION, EMULSION INTRAVENOUS at 07:39

## 2017-01-09 RX ADMIN — Medication 2 MG: at 16:38

## 2017-01-09 RX ADMIN — ROCURONIUM BROMIDE 20 MG: 10 INJECTION INTRAVENOUS at 10:58

## 2017-01-09 RX ADMIN — CEFAZOLIN 1 G: 1 INJECTION, POWDER, FOR SOLUTION INTRAMUSCULAR; INTRAVENOUS at 14:14

## 2017-01-09 RX ADMIN — FENTANYL CITRATE 50 MCG: 50 INJECTION, SOLUTION INTRAMUSCULAR; INTRAVENOUS at 09:03

## 2017-01-09 RX ADMIN — CEFAZOLIN 1 G: 1 INJECTION, POWDER, FOR SOLUTION INTRAMUSCULAR; INTRAVENOUS at 10:21

## 2017-01-09 RX ADMIN — Medication 5 MG: at 14:10

## 2017-01-09 RX ADMIN — FENTANYL CITRATE 50 MCG: 50 INJECTION, SOLUTION INTRAMUSCULAR; INTRAVENOUS at 08:53

## 2017-01-09 RX ADMIN — ROCURONIUM BROMIDE 20 MG: 10 INJECTION INTRAVENOUS at 13:11

## 2017-01-09 RX ADMIN — SODIUM CHLORIDE, POTASSIUM CHLORIDE, SODIUM LACTATE AND CALCIUM CHLORIDE: 600; 310; 30; 20 INJECTION, SOLUTION INTRAVENOUS at 14:45

## 2017-01-09 RX ADMIN — SODIUM CHLORIDE, POTASSIUM CHLORIDE, SODIUM LACTATE AND CALCIUM CHLORIDE: 600; 310; 30; 20 INJECTION, SOLUTION INTRAVENOUS at 07:26

## 2017-01-09 RX ADMIN — SODIUM CHLORIDE: 9 INJECTION, SOLUTION INTRAVENOUS at 08:21

## 2017-01-09 RX ADMIN — PHENYLEPHRINE HYDROCHLORIDE 100 MCG: 10 INJECTION, SOLUTION INTRAMUSCULAR; INTRAVENOUS; SUBCUTANEOUS at 16:30

## 2017-01-09 RX ADMIN — CEFAZOLIN 1 G: 1 INJECTION, POWDER, FOR SOLUTION INTRAMUSCULAR; INTRAVENOUS at 12:18

## 2017-01-09 RX ADMIN — FENTANYL CITRATE 50 MCG: 50 INJECTION, SOLUTION INTRAMUSCULAR; INTRAVENOUS at 17:08

## 2017-01-09 RX ADMIN — ALBUMIN HUMAN 250 ML: 50 SOLUTION INTRAVENOUS at 14:44

## 2017-01-09 RX ADMIN — PHENYLEPHRINE HYDROCHLORIDE 100 MCG: 10 INJECTION, SOLUTION INTRAMUSCULAR; INTRAVENOUS; SUBCUTANEOUS at 16:43

## 2017-01-09 RX ADMIN — GLYCOPYRROLATE 0.4 MG: 0.2 INJECTION, SOLUTION INTRAMUSCULAR; INTRAVENOUS at 16:38

## 2017-01-09 RX ADMIN — Medication 5 MG: at 15:45

## 2017-01-09 RX ADMIN — BUPIVACAINE HYDROCHLORIDE 8 ML/HR: 7.5 INJECTION, SOLUTION EPIDURAL; RETROBULBAR at 11:15

## 2017-01-09 RX ADMIN — FENTANYL CITRATE 50 MCG: 50 INJECTION, SOLUTION INTRAMUSCULAR; INTRAVENOUS at 16:40

## 2017-01-09 NOTE — ANESTHESIA CARE TRANSFER NOTE
Patient: Minerva Blanco    LAPAROSCOPY DIAGNOSTIC (GENERAL) (N/A Abdomen)  THORACOTOMY (Left Chest)  ESOPHAGECTOMY (N/A Esophagus)  CREATE SPIT FISTULA (N/A Neck)  Additional InformationProcedure(s):  Flexible Bronchoscopy, Esophagogastroduodenoscopy, Laparoscopy Lysis of Adhesion, Laparoscopy  Proximal Gastrectomy with Laaparoscopy Gastrotomy tube palcement, Left Thoracotomy, Distal Esophagectomy, Thoracic Duct Ligation, left neck dissection , Spit Fistula, chest tube placement,  (ERAS Patient) Anesthesia General with Block - Wound Class: I-Clean   - Wound Class: II-Clean Contaminated   - Wound Class: II-Clean Contaminated   - Wound Class: I-Clean    Diagnosis: Esophageal Perforation   Diagnosis Additional Information: No value filed.    Anesthesia Type:   General, ETT, Periph. Nerve Block for postop pain     Note:  Airway :Face Mask  Patient transferred to:PACU  Comments: Transferred to: PACU with supplemental O2 6L FM    Patient vital signs: stable    Airway: none    Monitors applied. Arouses to voice. Report to ROMAINE Blood.                Vitals: (Last set prior to Anesthesia Care Transfer)              Electronically Signed By: CLIVE Fountain CRNA  January 9, 2017  5:12 PM

## 2017-01-09 NOTE — ANESTHESIA PROCEDURE NOTES
Epidural Procedure Note    Staff:     Anesthesiologist:  EDMAR REY    Resident/CRNA:  MARGE SHEETS    Procedure performed by resident/CRNA in the presence of a teaching physician    Location: Pre-op     Procedure start time:  1/9/2017 6:40 AM     Procedure end time:  1/9/2017 7:00 AM   Pre-procedure checklist:   patient identified, IV checked, site marked, risks and benefits discussed, informed consent, monitors and equipment checked, pre-op evaluation, at physician/surgeon's request and post-op pain management      Correct Patient: Yes      Correct Position: Yes      Correct Site: Yes      Correct Procedure: Yes      Correct Laterality:  Yes    Site Marked:  Yes  Procedure:     Procedure:  Epidural catheter    ASA:  3    Position:  Sitting    Sterile Prep: chloraprep      Insertion site:  T7-8    Local skin infiltration:  1% lidocaine    amount (mL):  2    Approach:  Midline    Needle gauge (G):  17    Needle Length (in):  3.5    Block Needle Type:  Touhy    Injection Technique:  LORT saline    DAVION at (cm):  4.5    Attempts:  2    Redirects:  2    Catheter gauge (G):  19    Catheter threaded easily: Yes      Threaded to cm at skin:  9    Threaded in epidural space (cm):  4.5    Paresthesias:  No    Aspiration negative for Heme or CSF: Yes      Test dose (mL):  3     Local anesthetic:  Lidocaine 1.5% w/ 1:200,000 epinephrine    Test dose negative for signs of intravascular, subdural or intrathecal injection: Yes    Assessment/Narrative:      Test dose 3mL of lidocaine 1.5% with 1:200k epinephrine injected into epidural catheter with intermittent aspirations negative for heme or CSF. No si/sx of intravascular or intrathecal injection.        Arterial Line Procedure Note  Staff:     Anesthesiologist:  HANNY RAY  Location: In OR After Induction  Procedure Start/Stop Times:     patient identified, IV checked, site marked, risks and benefits discussed, informed consent, monitors and equipment checked, pre-op  evaluation and at physician/surgeon's request      Correct Patient: Yes      Correct Position: Yes      Correct Site: Yes      Correct Procedure: Yes      Correct Laterality:  Yes    Site Marked:  Yes  Line Placement:     Procedure:  Arterial Line    Insertion Site:  Radial    Insertion laterality:  Left    Skin Prep: Chloraprep      Patient Prep: patient draped, mask, sterile gloves, sterile gown, hat and hand hygiene      Local skin infiltration:  None    Ultrasound Guided?: No      Catheter size:  20 gauge, 12 cm    Cath secured with: suture      Dressing:  Tegaderm    Complications:  None obvious    Arterial waveform: Yes      IBP within 10% of NIBP: Yes      Central Line Procedure Note  Staff:     Anesthesiologist:  HANNY RAY    Resident/CRNA:  JAMES CESPEDES    Central line placed by Resident/CRNA in the presence of a teaching physician    Location: In OR after induction  Procedure Start/Stop Times:     patient identified, IV checked, site marked, risks and benefits discussed, informed consent, monitors and equipment checked, pre-op evaluation and at physician/surgeon's request      Correct Patient: Yes      Correct Position: Yes      Correct Site: Yes      Correct Procedure: Yes      Correct Laterality:  Yes    Site Marked:  Yes  Line Placement:     Procedure:  Central Line    Insertion laterality:  Right    Insertion site:  Internal Jugular    Position:  Trendelenburg      Maximal Sterile Barriers: All elements of maximal sterile barrier technique followed      (Maximal sterile barriers include:   Sterile gown, Sterile Gloves, Mask, Cap, Whole body draped, hand hygiene and acceptable skin prep).Skin Prep: Chloraprep         Injection Technique:  Seldinger Technique    Local skin infiltration:  None    Catheter size:  7 Fr, 3 lumen, 20 cm    Catheter length at skin (cm):  15    Cath secured with: suture      Dressing:  Tegaderm and Biopatch    Complications:  None obvious    Blood aspirated all  lumens: Yes      All Lumens Flushed: Yes      Verification method:  Placement to be verified post-op    Arterial Line Procedure Note  Staff:     Anesthesiologist:  HANNY RAY    Resident/CRNA:  JAMES CESPEDES    Arterial line performed by resident/CRNA in presence of a teaching physician    Location: In OR After Induction  Procedure Start/Stop Times:     patient identified, IV checked, site marked, risks and benefits discussed, informed consent, monitors and equipment checked, pre-op evaluation and at physician/surgeon's request      Correct Patient: Yes      Correct Position: Yes      Correct Site: Yes      Correct Procedure: Yes      Correct Laterality:  Yes    Site Marked:  Yes  Line Placement:     Procedure:  Arterial Line    Insertion Site:  Radial    Insertion laterality:  Right    Skin Prep: Chloraprep      Patient Prep: patient draped, mask, sterile gloves, sterile gown, hat and hand hygiene      Local skin infiltration:  None    Ultrasound Guided?: No      Catheter size:  20 gauge, 12 cm    Cath secured with: suture      Dressing:  Tegaderm    Complications:  None obvious    Arterial waveform: Yes      IBP within 10% of NIBP: Yes    Assessment/Narrative:      Left sided arterial line continues to become dampened.  Replaced with a right sided line.

## 2017-01-10 ENCOUNTER — APPOINTMENT (OUTPATIENT)
Dept: GENERAL RADIOLOGY | Facility: CLINIC | Age: 72
DRG: 326 | End: 2017-01-10
Attending: THORACIC SURGERY (CARDIOTHORACIC VASCULAR SURGERY)
Payer: MEDICARE

## 2017-01-10 PROCEDURE — 71010 XR CHEST PORT 1 VW: CPT

## 2017-01-10 NOTE — ANESTHESIA POST-OP FOLLOW-UP NOTE
REGIONAL ANESTHESIA PAIN SERVICE EPIDURAL NOTE    SUBJECTIVE:      Interval History: Pt reports moderate to severe left lateral and posterior chest pain.  Pt she would like to switch back to PCA Dilaudid because she isn't able to maintain good pain control with PCA fentanyl, and she has less relief for incisional pain with epidural since the rate was decreased 6mL/hr yesterday due to hypotension.  Denies any weakness, paresthesias, circumoral numbness, metallic taste or tinnitus. Patient is currently without nausea and NPO          Anticoagulation:    enoxaparin (LOVENOX) injection 40 mg  40 mg, SC, Q24H  Ordered              Total IV opioids last 8 hr shift: Fentanyl 330 mcg and 450 mcg over past 4 hrs    OBJECTIVE:  Diagnostics  CBC RESULTS:    Recent Labs    Lab Test  01/10/17   0417    WBC   17.4*    RBC   3.52*    HGB   10.1*    HCT   32.7*    MCV   93    MCH   28.7    MCHC   30.9*    RDW   15.6*    PLT   367          Vitals:              Temp:  [97.2  F (36.2  C)-98  F (36.7  C)] 98  F (36.7  C)  Heart Rate:  [] 90  Resp:  [10-29] 13  BP: ()/(50-72) 88/72 mmHg  MAP:  [56 mmHg-163 mmHg] 58 mmHg  Arterial Line BP: ()/() 73/45 mmHg  SpO2:  [92 %-100 %] 100 %    Exam:                Strength 5/5 and symmetric grossly in bilateral LE              Epidural site with dressing c/d/i, no tenderness, erythema, heme, edema      ASSESSMENT/PLAN:     Minerva Blanco is a 71 year old female POD #1 s/p <LAPAROSCOPY DIAGNOSTIC (GENERAL), THORACOTOMY, THORACOTOMY CREATE SPIT FISTULA and placement of T7-8 epidural for analgesia.  Pt receiving suboptimal analgesia with epidural infusion Bupivacaine 0.0625% at 6mL/hour and PCA Fentanyl 20-30 mcg Q 10 min PRN dose.  No weakness or paresthesias.  No evidence of adverse side effects related to local anesthetic.    - increase epidural Bupivacaine 0.0625% infusion rate to 7mL/hour, will reevaluate in 2 hrs    - will continue to follow and adjust as  needed      1215 Follow up: Pt reports unchanged left lateral and posterior chest wall pain.  Sensory level bilateral T4-8.  Pt receiving fluid bolus, MAP 68.  Pt switched to PCA Dilaudid 0.4mg Q 10 minute lockout.  Starting gabapentin 300 mg TID after verified this is same dose she tolerated outpatient when initiated 10/2016 for herpetic neuralgia. No further adjustments in epidural infusion rate at this time. Will continue to follow and be available to reassess PRN.        Nelson Buck MD  [facility]  January 10, 2017  3:00 PM      24 hour Job Code Pager.  For in-house use only.      Dial * * [phone] and  [place]:  [phone]  [place]: [phone]  Peds:  [phone]  Then enter call-back number  May text page using [employer], but NOT [employer].

## 2017-01-11 ENCOUNTER — APPOINTMENT (OUTPATIENT)
Dept: OCCUPATIONAL THERAPY | Facility: CLINIC | Age: 72
DRG: 326 | End: 2017-01-11
Attending: THORACIC SURGERY (CARDIOTHORACIC VASCULAR SURGERY)
Payer: MEDICARE

## 2017-01-11 ENCOUNTER — APPOINTMENT (OUTPATIENT)
Dept: GENERAL RADIOLOGY | Facility: CLINIC | Age: 72
DRG: 326 | End: 2017-01-11
Attending: THORACIC SURGERY (CARDIOTHORACIC VASCULAR SURGERY)
Payer: MEDICARE

## 2017-01-11 ENCOUNTER — APPOINTMENT (OUTPATIENT)
Dept: PHYSICAL THERAPY | Facility: CLINIC | Age: 72
DRG: 326 | End: 2017-01-11
Attending: THORACIC SURGERY (CARDIOTHORACIC VASCULAR SURGERY)
Payer: MEDICARE

## 2017-01-11 ENCOUNTER — APPOINTMENT (OUTPATIENT)
Dept: CARDIOLOGY | Facility: CLINIC | Age: 72
DRG: 326 | End: 2017-01-11
Attending: THORACIC SURGERY (CARDIOTHORACIC VASCULAR SURGERY)
Payer: MEDICARE

## 2017-01-11 LAB
ABO + RH BLD: NORMAL
ABO + RH BLD: NORMAL
BLD GP AB SCN SERPL QL: NORMAL
BLD PROD TYP BPU: NORMAL
BLD PROD TYP BPU: NORMAL
BLD UNIT ID BPU: 0
BLOOD BANK CMNT PATIENT-IMP: NORMAL
BLOOD BANK CMNT PATIENT-IMP: NORMAL
BLOOD PRODUCT CODE: NORMAL
BPU ID: NORMAL
DLCOCOR-%PRED-PRE: 89 %
DLCOCOR-PRE: 16.85 ML/MIN/MMHG
DLCOUNC-%PRED-PRE: 85 %
DLCOUNC-PRE: 16.13 ML/MIN/MMHG
DLCOUNC-PRED: 18.79 ML/MIN/MMHG
ERV-%PRED-PRE: 101 %
ERV-PRE: 0.86 L
ERV-PRED: 0.85 L
EXPTIME-PRE: 6.62 SEC
FEF2575-%PRED-POST: 71 %
FEF2575-%PRED-PRE: 36 %
FEF2575-POST: 1.2 L/SEC
FEF2575-PRE: 0.61 L/SEC
FEF2575-PRED: 1.68 L/SEC
FEFMAX-%PRED-PRE: 65 %
FEFMAX-PRE: 3.25 L/SEC
FEFMAX-PRED: 4.98 L/SEC
FEV1-%PRED-PRE: 68 %
FEV1-PRE: 1.3 L
FEV1FEV6-PRE: 63 %
FEV1FEV6-PRED: 79 %
FEV1FVC-PRE: 62 %
FEV1FVC-PRED: 79 %
FEV1SVC-PRE: 59 %
FEV1SVC-PRED: 75 %
FIFMAX-PRE: 2.95 L/SEC
FRCPLETH-%PRED-PRE: 135 %
FRCPLETH-PRE: 3.35 L
FRCPLETH-PRED: 2.48 L
FVC-%PRED-PRE: 85 %
FVC-PRE: 2.1 L
FVC-PRED: 2.44 L
IC-%PRED-PRE: 79 %
IC-PRE: 1.34 L
IC-PRED: 1.68 L
NUM BPU REQUESTED: 3
RVPLETH-%PRED-PRE: 131 %
RVPLETH-PRE: 2.49 L
RVPLETH-PRED: 1.89 L
SPECIMEN EXP DATE BLD: NORMAL
TLCPLETH-%PRED-PRE: 109 %
TLCPLETH-PRE: 4.69 L
TLCPLETH-PRED: 4.27 L
TRANSFUSION STATUS PATIENT QL: NORMAL
TRANSFUSION STATUS PATIENT QL: NORMAL
VA-%PRED-PRE: 86 %
VA-PRE: 3.81 L
VC-%PRED-PRE: 87 %
VC-PRE: 2.2 L
VC-PRED: 2.53 L

## 2017-01-11 PROCEDURE — 71010 XR CHEST PORT 1 VW: CPT | Mod: 77

## 2017-01-11 PROCEDURE — 40000264 ECHO COMPLETE WITH LUMASON

## 2017-01-11 PROCEDURE — 71010 XR CHEST PORT 1 VW: CPT

## 2017-01-11 PROCEDURE — 93306 TTE W/DOPPLER COMPLETE: CPT | Mod: 26 | Performed by: INTERNAL MEDICINE

## 2017-01-12 ENCOUNTER — APPOINTMENT (OUTPATIENT)
Dept: OCCUPATIONAL THERAPY | Facility: CLINIC | Age: 72
DRG: 326 | End: 2017-01-12
Attending: THORACIC SURGERY (CARDIOTHORACIC VASCULAR SURGERY)
Payer: MEDICARE

## 2017-01-12 ENCOUNTER — APPOINTMENT (OUTPATIENT)
Dept: GENERAL RADIOLOGY | Facility: CLINIC | Age: 72
DRG: 326 | End: 2017-01-12
Attending: THORACIC SURGERY (CARDIOTHORACIC VASCULAR SURGERY)
Payer: MEDICARE

## 2017-01-12 PROCEDURE — 71010 XR CHEST PORT 1 VW: CPT

## 2017-01-12 NOTE — ANESTHESIA POST-OP FOLLOW-UP NOTE
REGIONAL ANESTHESIA PAIN SERVICE EPIDURAL NOTE  SUBJECTIVE:    Interval History: Pt reports overall improved comfort since yesterday, currently rating pain at 6/10 and adequate pain control via epidural.  Denies circumoral numbness, metallic taste or tinnitus.  Pt has not ambulated yet, but is able to reposition in bed with assistance and denies weakness or paresthesias.  Patient is currently NPO, tube feedings at 45mL/hr, no nausea                Clinically Aligned Pain Assessment (CAPA):  Comfort (How is your pain?): Tolerable with discomfort  Change in Pain (Since your last medication/intervention?): Getting better  Pain Control (How are your pain treatments working?):  Partially effective pain control  Functioning (Are you able to do activities to get better?) : Can do most things, but pain gets in the way of some         Anticoagulation:    enoxaparin (LOVENOX) injection 40 mg  40 mg, SC, Q24H  Given: 01/10 1231            OBJECTIVE:  Diagnostics:    WBC     22.3   1/11/2017  RBC     2.87   1/11/2017  HGB      8.2 =>7.4   1/11/2017  HCT     26.6   1/11/2017  PLT      343   1/11/2017      AST      178   1/11/2017  ALT      175   1/11/2017    INR     1.93   1/11/2017  INR     1.36   8/25/2016  INR     1.30   4/16/2016    Vitals:              Temp:  [97.7  F (36.5  C)-98.7  F (37.1  C)] 98.7  F (37.1  C)  Heart Rate:  [] 83  Resp:  [9-40] 25  BP: ()/(39-76) 113/72 mmHg  MAP:  [30 mmHg-93 mmHg] 30 mmHg  Arterial Line BP: (32-94)/(29-92) 32/30 mmHg  SpO2:  [88 %-100 %] 100 %    Exam:                Strength 5/5 and symmetric grossly in bilateral LE              Epidural site with slight amount old blood along catheter at insertion site, dressing secure, c/d/i, no tenderness, erythema, heme, edema      ASSESSMENT/PLAN:     Minerva Blanco is a 71 year old female POD #2 s/p <LAPAROSCOPY DIAGNOSTIC (GENERAL), THORACOTOMY, THORACOTOMY CREATE SPIT FISTULA and placement of T7-8 epidural for analgesia.  Pt  receiving adequate analgesia with epidural infusion Bupivacaine 0.0625% at 7mL/hr and Gabapentin 300 mg Q 8 hr, Oxycodone 5 mg via FT PRN +  PCA Dilaudid 0.4mg Q 10min PRN.   No weakness or paresthesias, adequate sensory block.  No evidence of adverse side effects related to local anesthetic.  Elevated LFTs trending down, WBC 22.3 and  INR 1.93 today    - continue current epidural Bupivacaine 0.0625% infusion at 7mL/hour and multimodal analgesia  - will continue to monitor labs    - will continue to follow and adjust as needed      Nelson Buck MD  [facility]  1/11/2017    24 hour Job Code Pager.  For in-house use only.      Dial * * [phone] and  [place]:  [phone]  [place]: [phone]  Peds:  [phone]  Then enter call-back number  May text page using [employer], but NOT [employer].

## 2017-01-13 ENCOUNTER — APPOINTMENT (OUTPATIENT)
Dept: GENERAL RADIOLOGY | Facility: CLINIC | Age: 72
DRG: 326 | End: 2017-01-13
Attending: THORACIC SURGERY (CARDIOTHORACIC VASCULAR SURGERY)
Payer: MEDICARE

## 2017-01-13 PROCEDURE — 71010 XR CHEST PORT 1 VW: CPT

## 2017-01-13 NOTE — ANESTHESIA POST-OP FOLLOW-UP NOTE
REGIONAL ANESTHESIA PAIN SERVICE EPIDURAL NOTE  SUBJECTIVE:    Interval History: Pt reports moderate pain control via epidural.  Denies any weakness, paresthesias, circumoral numbness, metallic taste or tinnitus.  Pt is ambulating with assistance.  Patient is currently without nausea; without pruritis.  Currently tolerating TPN.  BM+.                Clinically Aligned Pain Assessment (CAPA):  Comfort (How is your pain?): Tolerable with discomfort  Change in Pain (Since your last medication/intervention?): Getting better  Pain Control (How are your pain treatments working?):  Partially effective pain control  Functioning (Are you able to do activities to get better?) : Can do most things, but pain gets in the way of some    Sleep (Does your pain management allow you to sleep or rest?): Awake with occasional pain         Anticoagulation:    enoxaparin (LOVENOX) injection 40 mg  40 mg, SC, Q24H  Given: 01/12 1259              OBJECTIVE:  Diagnostics:  WBC      8.9   1/13/2017  RBC     2.80   1/13/2017  HGB      7.9   1/13/2017  HCT     25.1   1/13/2017  PLT      266   1/13/2017    INR     1.29   1/12/2017  INR     1.93   1/11/2017  INR     1.36   8/25/2016    Vitals:              Temp:  [97.6  F (36.4  C)-98  F (36.7  C)] 97.8  F (36.6  C)  Heart Rate:  [] 85  Resp:  [12-30] 12  BP: ()/(53-90) 111/65 mmHg  SpO2:  [92 %-99 %] 97 %    Exam:                Strength 5/5 and symmetric grossly in bilateral LE                            Epidural site with dressing c/d/i, no tenderness, erythema, heme, edema      ASSESSMENT/PLAN:     Minerva Blanco is a 71 year old female POD #4 s/p <LAPAROSCOPY DIAGNOSTIC (GENERAL), THORACOTOMY, THORACOTOMY CREATE SPIT FISTULA and placement of T7-8 epidural for analgesia.  Pt receiving adequate analgesia with epidural infusion Bupivacaine at 7mL/hour.  Pt is ambulating with assistance.  No weakness or paresthesias.  No evidence of adverse side effects related to local  anesthetic.  Pt has two left chest tubes    - continue current epidural infusion at 7mL/hour    - will DC epidural catheter 1/14/17 POD #5  - will continue to follow and adjust as needed    Nelson Buck MD  [facility]  1/13/2017  4:04 PM      24 hour Job Code Pager.  For in-house use only.      Dial * * *[phone] and  [place]:  [phone]  [place]: [phone]  Peds:  [phone]  Then enter call-back number  May text page using [employer], but NOT [employer].

## 2017-01-14 ENCOUNTER — APPOINTMENT (OUTPATIENT)
Dept: PHYSICAL THERAPY | Facility: CLINIC | Age: 72
DRG: 326 | End: 2017-01-14
Attending: THORACIC SURGERY (CARDIOTHORACIC VASCULAR SURGERY)
Payer: MEDICARE

## 2017-01-15 ENCOUNTER — APPOINTMENT (OUTPATIENT)
Dept: OCCUPATIONAL THERAPY | Facility: CLINIC | Age: 72
DRG: 326 | End: 2017-01-15
Attending: THORACIC SURGERY (CARDIOTHORACIC VASCULAR SURGERY)
Payer: MEDICARE

## 2017-01-15 ENCOUNTER — APPOINTMENT (OUTPATIENT)
Dept: PHYSICAL THERAPY | Facility: CLINIC | Age: 72
DRG: 326 | End: 2017-01-15
Attending: THORACIC SURGERY (CARDIOTHORACIC VASCULAR SURGERY)
Payer: MEDICARE

## 2017-01-15 NOTE — ANESTHESIA POST-OP FOLLOW-UP NOTE
REGIONAL ANESTHESIA PAIN SERVICE EPIDURAL NOTE  SUBJECTIVE:      Interval History: Pt reports moderate pain control via epidural.  Denies any weakness, paresthesias, circumoral numbness, metallic taste or tinnitus.  Pt is ambulating with assistance.  Patient is currently without nausea and without pruritis.  She is tolerating TPN.                   Clinically Aligned Pain Assessment (CAPA):  Comfort (How is your pain?): Tolerable with discomfort  Change in Pain (Since your last medication/intervention?): Getting better  Pain Control (How are your pain treatments working?):  Partially effective pain control  Functioning (Are you able to do activities to get better?): Can do most things, but pain gets in the way of some    Sleep (Does your pain management allow you to sleep or rest?): Awake with occasional pain         Anticoagulation:    enoxaparin (LOVENOX) injection 40 mg    40 mg, SC, Q24H    Given: 01/12 1259                  OBJECTIVE:    Vital signs:  Temp: 36.7  C (98  F) Temp src: Oral BP: 137/82 mmHg Pulse: 93 Heart Rate: 90 Resp: 18 SpO2: 99 % O2 Device: Nasal cannula Oxygen Delivery: 2 LPM Height: 152.4 cm (5') Weight: 52.4 kg (115 lb 8.3 oz)  Estimated body mass index is 22.56 kg/(m^2) as calculated from the following:    Height as of this encounter: 1.524 m (5').    Weight as of this encounter: 52.4 kg (115 lb 8.3 oz).       Lab Results    Component  Value  Date      WBC  8.8  01/14/2017      WBC  8.9  01/13/2017      WBC  12.1*  01/12/2017      HGB  7.9*  01/14/2017      HGB  7.9*  01/13/2017      HGB  8.3*  01/12/2017      HCT  25.3*  01/14/2017      HCT  25.1*  01/13/2017      HCT  26.8*  01/12/2017      PLT  296  01/14/2017      PLT  266  01/13/2017      PLT  255  01/12/2017      NA  141  01/14/2017      NA  140  01/13/2017      NA  139  01/13/2017      POTASSIUM  3.9  01/14/2017      POTASSIUM  3.6  01/13/2017      POTASSIUM  3.5  01/13/2017      CHLORIDE  106  01/14/2017      CHLORIDE  105   01/13/2017      CHLORIDE  105  01/13/2017      CO2  26  01/14/2017      CO2  28  01/13/2017      CO2  27  01/13/2017      BUN  15  01/14/2017      BUN  14  01/13/2017      BUN  15  01/13/2017      CR  0.45*  01/14/2017      CR  0.40*  01/13/2017      CR  0.40*  01/13/2017      GLC  119*  01/14/2017      GLC  101*  01/13/2017      GLC  84  01/13/2017      TROPI  0.195*  01/13/2017      TROPI  0.283*  01/13/2017      TROPI  0.334*  01/13/2017      AST  178*  01/11/2017      AST  443*  01/10/2017      AST  24  10/20/2016      ALT  175*  01/11/2017      ALT  273*  01/10/2017      ALT  24  10/20/2016      ALKPHOS  65  01/11/2017      ALKPHOS  68  01/10/2017      ALKPHOS  152*  10/20/2016      BILITOTAL  0.5  01/11/2017      BILITOTAL  0.9  01/10/2017      BILITOTAL  0.7  10/20/2016      INR  1.29*  01/12/2017      INR  1.93*  01/11/2017      INR  1.36*  08/25/2016        Exam:                Strength 5/5 and symmetric grossly in bilateral LE                            Epidural site with dressing c/d/i, no tenderness, erythema, heme, edema      ASSESSMENT/PLAN:       Minerva Blanco is a 71 year old female POD #5 s/p <LAPAROSCOPY DIAGNOSTIC (GENERAL), THORACOTOMY, THORACOTOMY CREATE SPIT FISTULA and placement of T7-8 epidural for analgesia.  Pt receiving adequate analgesia with epidural infusion Bupivacaine at 7mL/hour.  Pt is ambulating with assistance.  No weakness or paresthesias.  No evidence of adverse side effects related to local anesthetic.    -Epidural removed at 12:30 PM; catheter tip was intact  -Last Lovenox dose was 40 mg at 12:06 on 1/13/16  -Lovenox can be started in four hours after being pulled.  -Will sign off    Nelson Buck MD  [facility]  January 14, 2017  7:47 PM      24 hour Job Code Pager.  For in-house use only.      Dial * * [phone] and  [place]:  [phone]  [place]: [phone]  Peds:  [phone]  Then enter call-back number  May text page using [employer], but NOT [employer].

## 2017-01-16 ENCOUNTER — HOSPITAL ENCOUNTER (INPATIENT)
Dept: VASCULAR ULTRASOUND | Facility: CLINIC | Age: 72
DRG: 326 | End: 2017-01-16
Attending: THORACIC SURGERY (CARDIOTHORACIC VASCULAR SURGERY) | Admitting: THORACIC SURGERY (CARDIOTHORACIC VASCULAR SURGERY)
Payer: MEDICARE

## 2017-01-16 ENCOUNTER — APPOINTMENT (OUTPATIENT)
Dept: GENERAL RADIOLOGY | Facility: CLINIC | Age: 72
DRG: 326 | End: 2017-01-16
Attending: THORACIC SURGERY (CARDIOTHORACIC VASCULAR SURGERY)
Payer: MEDICARE

## 2017-01-16 ENCOUNTER — APPOINTMENT (OUTPATIENT)
Dept: PHYSICAL THERAPY | Facility: CLINIC | Age: 72
DRG: 326 | End: 2017-01-16
Attending: THORACIC SURGERY (CARDIOTHORACIC VASCULAR SURGERY)
Payer: MEDICARE

## 2017-01-16 PROCEDURE — 40000940 XR CHEST PORT 1 VW

## 2017-01-17 ENCOUNTER — APPOINTMENT (OUTPATIENT)
Dept: SPEECH THERAPY | Facility: CLINIC | Age: 72
DRG: 326 | End: 2017-01-17
Attending: PHYSICIAN ASSISTANT
Payer: MEDICARE

## 2017-01-18 ENCOUNTER — APPOINTMENT (OUTPATIENT)
Dept: OCCUPATIONAL THERAPY | Facility: CLINIC | Age: 72
DRG: 326 | End: 2017-01-18
Attending: THORACIC SURGERY (CARDIOTHORACIC VASCULAR SURGERY)
Payer: MEDICARE

## 2017-01-18 ENCOUNTER — APPOINTMENT (OUTPATIENT)
Dept: SPEECH THERAPY | Facility: CLINIC | Age: 72
DRG: 326 | End: 2017-01-18
Attending: THORACIC SURGERY (CARDIOTHORACIC VASCULAR SURGERY)
Payer: MEDICARE

## 2017-01-18 NOTE — ANESTHESIA POSTPROCEDURE EVALUATION
Patient: Minerva Blanco    LAPAROSCOPY DIAGNOSTIC (GENERAL) (N/A Abdomen)  THORACOTOMY (Left Chest)  ESOPHAGECTOMY (N/A Esophagus)  CREATE SPIT FISTULA (N/A Neck)  Additional InformationProcedure(s):  Flexible Bronchoscopy, Esophagogastroduodenoscopy, Laparoscopy Lysis of Adhesion, Laparoscopy  Proximal Gastrectomy with Laaparoscopy Gastrotomy tube palcement, Left Thoracotomy, Distal Esophagectomy, Thoracic Duct Ligation, left neck dissection , Spit Fistula, chest tube placement,  (ERAS Patient) Anesthesia General with Block - Wound Class: I-Clean   - Wound Class: II-Clean Contaminated   - Wound Class: II-Clean Contaminated   - Wound Class: I-Clean    Diagnosis:Esophageal Perforation   Diagnosis Additional Information: No value filed.    Anesthesia Type:  General, ETT, Periph. Nerve Block for postop pain    Note:  Anesthesia Post Evaluation    Patient location during evaluation: PACU  Patient participation: Able to fully participate in evaluation  Level of consciousness: awake and alert  Pain management: adequate  Airway patency: patent  Cardiovascular status: acceptable and hemodynamically stable  Respiratory status: acceptable  Hydration status: acceptable  PONV: none     Anesthetic complications: None          Last vitals:  Filed Vitals:    01/18/17 0500 01/18/17 0800 01/18/17 1200   BP: 114/71  114/71   Pulse:      Temp: 36.9  C (98.5  F) 35.2  C (95.4  F) 36.9  C (98.4  F)   Resp:  20 20   SpO2:  98% 98%       Electronically Signed By: Neri Ramirez MD  January 18, 2017  1:31 PM

## 2017-01-19 ENCOUNTER — APPOINTMENT (OUTPATIENT)
Dept: PHYSICAL THERAPY | Facility: CLINIC | Age: 72
DRG: 326 | End: 2017-01-19
Attending: THORACIC SURGERY (CARDIOTHORACIC VASCULAR SURGERY)
Payer: MEDICARE

## 2017-01-19 ENCOUNTER — APPOINTMENT (OUTPATIENT)
Dept: SPEECH THERAPY | Facility: CLINIC | Age: 72
DRG: 326 | End: 2017-01-19
Attending: THORACIC SURGERY (CARDIOTHORACIC VASCULAR SURGERY)
Payer: MEDICARE

## 2017-01-20 ENCOUNTER — APPOINTMENT (OUTPATIENT)
Dept: GENERAL RADIOLOGY | Facility: CLINIC | Age: 72
DRG: 326 | End: 2017-01-20
Attending: THORACIC SURGERY (CARDIOTHORACIC VASCULAR SURGERY)
Payer: MEDICARE

## 2017-01-20 ENCOUNTER — APPOINTMENT (OUTPATIENT)
Dept: OCCUPATIONAL THERAPY | Facility: CLINIC | Age: 72
DRG: 326 | End: 2017-01-20
Attending: THORACIC SURGERY (CARDIOTHORACIC VASCULAR SURGERY)
Payer: MEDICARE

## 2017-01-20 ENCOUNTER — HOSPITAL ENCOUNTER (INPATIENT)
Dept: VASCULAR ULTRASOUND | Facility: CLINIC | Age: 72
DRG: 326 | End: 2017-01-20
Attending: THORACIC SURGERY (CARDIOTHORACIC VASCULAR SURGERY) | Admitting: THORACIC SURGERY (CARDIOTHORACIC VASCULAR SURGERY)
Payer: MEDICARE

## 2017-01-20 PROCEDURE — 71020 XR CHEST 2 VW: CPT

## 2017-01-21 NOTE — TREATMENT PLAN
PERIOPERATIVE AND INTERVENTIONAL PAIN SERVICE CONTINUOUS NERVE INFUSION NOTE  SUBJECTIVE:  Patient referring abdominal pain, mainly around GJ tube      OBJECTIVE:    Diagnostic:  WBC     15.2   1/20/2017  RBC     2.99   1/20/2017  HGB      8.4   1/20/2017  HCT     26.9   1/20/2017  PLT      769   1/20/2017      Vitals:    Temp:  [36.5  C (97.7  F)-36.9  C (98.5  F)] 36.5  C (97.7  F)  Heart Rate:  [] 86  Resp:  [16-20] 16  BP: ()/(46-81) 158/81 mmHg  SpO2:  [98 %-100 %] 98 %      ASSESSMENT/PLAN:      - Increase gabapentin from 300 to 400 mgs ng tube TID  - Hydromorphone from 0.3 to 0.5 every 4 to every 2 hrs PRN  - Lidoderm patch around GJ tube on intact skin  - Plan discussed with resident primary service     Kenny Maria MD  Perioperative and Interventional Pain Service  1/20/2017 6:12 PM  [facility] 24-hour Job Code Pagers (for in-house use only, may text page using [employer])  [place]:  [phone]  [place]:  [phone]  Peds [facility]: [phone]

## 2017-01-23 ENCOUNTER — APPOINTMENT (OUTPATIENT)
Dept: OCCUPATIONAL THERAPY | Facility: CLINIC | Age: 72
DRG: 326 | End: 2017-01-23
Attending: THORACIC SURGERY (CARDIOTHORACIC VASCULAR SURGERY)
Payer: MEDICARE

## 2017-01-23 ENCOUNTER — APPOINTMENT (OUTPATIENT)
Dept: PHYSICAL THERAPY | Facility: CLINIC | Age: 72
DRG: 326 | End: 2017-01-23
Attending: THORACIC SURGERY (CARDIOTHORACIC VASCULAR SURGERY)
Payer: MEDICARE

## 2017-01-24 ENCOUNTER — APPOINTMENT (OUTPATIENT)
Dept: OCCUPATIONAL THERAPY | Facility: CLINIC | Age: 72
DRG: 326 | End: 2017-01-24
Attending: THORACIC SURGERY (CARDIOTHORACIC VASCULAR SURGERY)
Payer: MEDICARE

## 2017-01-24 ENCOUNTER — APPOINTMENT (OUTPATIENT)
Dept: GENERAL RADIOLOGY | Facility: CLINIC | Age: 72
DRG: 326 | End: 2017-01-24
Attending: STUDENT IN AN ORGANIZED HEALTH CARE EDUCATION/TRAINING PROGRAM
Payer: MEDICARE

## 2017-01-24 PROCEDURE — 71020 XR CHEST 2 VW: CPT

## 2017-01-25 ENCOUNTER — CARE COORDINATION (OUTPATIENT)
Dept: CARDIOLOGY | Facility: CLINIC | Age: 72
End: 2017-01-25

## 2017-01-25 ENCOUNTER — APPOINTMENT (OUTPATIENT)
Dept: OCCUPATIONAL THERAPY | Facility: CLINIC | Age: 72
DRG: 326 | End: 2017-01-25
Attending: THORACIC SURGERY (CARDIOTHORACIC VASCULAR SURGERY)
Payer: MEDICARE

## 2017-01-25 NOTE — PROGRESS NOTES
Patient has clinic visit within 24-48 hours of Discharge so no post DC follow up call is needed        Storm Whitlock MD 1/26/2017 11:14 AM              THORACIC SURGERY FOLLOW UP VISIT

## 2017-01-26 ENCOUNTER — OFFICE VISIT - HEALTHEAST (OUTPATIENT)
Dept: GERIATRICS | Facility: CLINIC | Age: 72
End: 2017-01-26

## 2017-01-26 ENCOUNTER — AMBULATORY - HEALTHEAST (OUTPATIENT)
Dept: GERIATRICS | Facility: CLINIC | Age: 72
End: 2017-01-26

## 2017-01-26 DIAGNOSIS — K58.0 IRRITABLE BOWEL SYNDROME WITH DIARRHEA: ICD-10-CM

## 2017-01-26 DIAGNOSIS — E78.5 HYPERLIPIDEMIA, UNSPECIFIED HYPERLIPIDEMIA TYPE: ICD-10-CM

## 2017-01-26 DIAGNOSIS — H91.92 DEAF, LEFT: ICD-10-CM

## 2017-01-26 DIAGNOSIS — M15.0 PRIMARY OSTEOARTHRITIS INVOLVING MULTIPLE JOINTS: ICD-10-CM

## 2017-01-26 DIAGNOSIS — Z98.890 STATUS POST THORACOTOMY: ICD-10-CM

## 2017-01-26 DIAGNOSIS — G35 MULTIPLE SCLEROSIS (H): ICD-10-CM

## 2017-01-26 DIAGNOSIS — I10 ESSENTIAL HYPERTENSION WITH GOAL BLOOD PRESSURE LESS THAN 140/90: ICD-10-CM

## 2017-01-26 DIAGNOSIS — C50.911 MALIGNANT NEOPLASM OF RIGHT FEMALE BREAST (H): ICD-10-CM

## 2017-01-26 DIAGNOSIS — D64.9 ANEMIA: ICD-10-CM

## 2017-01-26 DIAGNOSIS — K22.3 ESOPHAGUS PERFORATION: ICD-10-CM

## 2017-01-26 DIAGNOSIS — K22.3 PERFORATION OF ESOPHAGUS: ICD-10-CM

## 2017-01-26 DIAGNOSIS — M79.7 FIBROMYALGIA: ICD-10-CM

## 2017-01-26 NOTE — PROGRESS NOTES
THORACIC SURGERY FOLLOW UP VISIT    Dear Dr. Carroll,  I saw Mrs. Minerva Blanco in follow-up today. The clinical summary follows:     PREOP DIAGNOSIS   1.  Chronic esophageal perforation with mediastinal phlegmon.    2.  Status post fundoplication.      PROCEDURE   1/9/2017  1.  Esophagogastroscopy.    2.  Laparoscopic lysis of adhesions.    3.  Laparoscopic proximal gastrectomy and gastrostomy tube placement.    4.  Left thoracotomy with excision of mediastinal phlegmon and distal esophagectomy.    5.  Thoracic duct ligation.    6.  Right tube thoracostomy.    7.  Left esophageal spit fistula.    8.  Bronchoscopy.      PRIOR PROCEDURES  1) Multiple endoscopies and pharyngostomy tube placement x 2 (for drainage of paraesophageal cavity)  2) Esophageal stent placement, attempted placement of biliary stent into the paraesophageal cavity (7/22/2016)  3) Esophageal stent removal, placement of retrograde gastroesophageal tube (10/23/2016)  4) Toupet fundoplication (1/2016)      COMPLICATIONS  Thoracotomy wound infection requiring opening and antibiotics  INTERVAL STUDIES  None    TOB never  ETOH negative    SUBJECTIVE  Ms. Minerva Blanco feels well today. She reports that diarrhea has improved since she was started on tincture of opium yesterday. Over the weekend she was having about 12 BM overnight, which has now reduced to 5 BM. Pain is well controlled on current regimen. She denies issues with spit fistula and feels comfortable emptying bag. Has completed 10 day course of antibiotics for cellulitis.     From a personal perspective, she is very positive about her recovery and feels ready to discharge from TCU. Unfortunately, she just heard about her niece who passed away in a car accident. She would like to be home with family for this reason.     Thoracotomy incision c/d/i, without erythema, healing well  Spit fistula patent and viable    IMPRESSION    71 year-old female 3 weeks S/P esophagectomy and spit fistula  for end-stage GERD  Diarrhea secondary to tube feeds  Resolved wound infection    PLAN  I reviewed the plan as follows:  1) Rx for tincture of opium for diarrhea  2) Provided ostomy bag supplies, plan for home health care after d/c from TCU  3) Follow up in 1 month in clinic  Pain Control Plan: Prescribed 6 week Rx of oxycodone elixir.   They had all their questions answered and were in agreement with the plan.  I appreciate the opportunity to participate in the care of your patient and will keep you updated.  Sincerely,

## 2017-01-29 ENCOUNTER — TELEPHONE (OUTPATIENT)
Dept: SURGERY | Facility: CLINIC | Age: 72
End: 2017-01-29

## 2017-01-29 NOTE — TELEPHONE ENCOUNTER
[facility] On Call Fellow Note        Called by  Enrique regarding pt diarrhea. Expressed multiple frustrations with care provided at TCU as well as diarrhea she's been having since discharge. Reports that pt taking Imodium but refusing fiber supplements with her tube feedings. Requesting tincture of opium to help control her diarrhea. Schedule for clinic visit on 2/1/17    Review of chart reveals pt has had acute on chronic diarrhea. Discharge meds including colace, miralax, fiber, and imodium. Chart reveals no use of tincture of opium since August of 2016.      Suggested they ensure she's not receiving any stool softeners or laxatives. Ensure she's getting both her fiber and Imodium. Explained that she should be evaluated in person and have failed the prior stated interventions before paregoric opium should be prescribed.     Also explained to Enrique that I would have the thoracic team contact him Monday morning to hopefully arrange a visit prior to 2/1.

## 2017-01-30 DIAGNOSIS — R19.7 DIARRHEA DUE TO MALABSORPTION: Primary | ICD-10-CM

## 2017-01-30 DIAGNOSIS — K90.9 DIARRHEA DUE TO MALABSORPTION: Primary | ICD-10-CM

## 2017-01-30 NOTE — TELEPHONE ENCOUNTER
"Enrique called in and spoke with the on-call thoracic fellow about Minerva having \"constant diarrhea\"-she is on the commode for hours at a time. The Imodium was not helping and they are looking for tincture of opium to help get her diarrhea under control. I discussed this with Aleksander Rogers, thoracic PA and we agreed that since she has had this in the past and responded well to it, given her excessive diarrhea, this would be appropriate to prescribe.    Enrique spoke with the staff at the TCU Minerva is at to find out how to make this part of her medications there. He will  the prescription here and I will also fax the order to the TCU.    Minerva sees Dr. Wise in 2 days.  "

## 2017-01-31 ENCOUNTER — OFFICE VISIT - HEALTHEAST (OUTPATIENT)
Dept: GERIATRICS | Facility: CLINIC | Age: 72
End: 2017-01-31

## 2017-01-31 DIAGNOSIS — K22.3 PERFORATION OF ESOPHAGUS: ICD-10-CM

## 2017-01-31 DIAGNOSIS — C50.911 MALIGNANT NEOPLASM OF RIGHT FEMALE BREAST (H): ICD-10-CM

## 2017-01-31 DIAGNOSIS — K22.3 ESOPHAGUS PERFORATION: ICD-10-CM

## 2017-01-31 DIAGNOSIS — K52.9 CHRONIC DIARRHEA: ICD-10-CM

## 2017-01-31 DIAGNOSIS — H91.92 DEAF, LEFT: ICD-10-CM

## 2017-01-31 DIAGNOSIS — K58.0 IRRITABLE BOWEL SYNDROME WITH DIARRHEA: ICD-10-CM

## 2017-01-31 DIAGNOSIS — I10 ESSENTIAL HYPERTENSION WITH GOAL BLOOD PRESSURE LESS THAN 140/90: ICD-10-CM

## 2017-01-31 DIAGNOSIS — G89.29 CHRONIC PAIN: ICD-10-CM

## 2017-01-31 DIAGNOSIS — G35 MULTIPLE SCLEROSIS (H): ICD-10-CM

## 2017-01-31 DIAGNOSIS — Z98.890 STATUS POST THORACOTOMY: ICD-10-CM

## 2017-01-31 DIAGNOSIS — M15.0 PRIMARY OSTEOARTHRITIS INVOLVING MULTIPLE JOINTS: ICD-10-CM

## 2017-01-31 DIAGNOSIS — M79.7 FIBROMYALGIA: ICD-10-CM

## 2017-01-31 DIAGNOSIS — E78.5 HYPERLIPIDEMIA, UNSPECIFIED HYPERLIPIDEMIA TYPE: ICD-10-CM

## 2017-01-31 DIAGNOSIS — D64.9 ANEMIA: ICD-10-CM

## 2017-02-01 ENCOUNTER — OFFICE VISIT (OUTPATIENT)
Dept: SURGERY | Facility: CLINIC | Age: 72
End: 2017-02-01
Attending: THORACIC SURGERY (CARDIOTHORACIC VASCULAR SURGERY)
Payer: MEDICARE

## 2017-02-01 ENCOUNTER — RECORDS - HEALTHEAST (OUTPATIENT)
Dept: ADMINISTRATIVE | Facility: OTHER | Age: 72
End: 2017-02-01

## 2017-02-01 VITALS
OXYGEN SATURATION: 99 % | DIASTOLIC BLOOD PRESSURE: 67 MMHG | HEART RATE: 68 BPM | TEMPERATURE: 96.5 F | SYSTOLIC BLOOD PRESSURE: 113 MMHG | HEIGHT: 60 IN | WEIGHT: 105.1 LBS | BODY MASS INDEX: 20.63 KG/M2

## 2017-02-01 DIAGNOSIS — K90.9 DIARRHEA DUE TO MALABSORPTION: Primary | ICD-10-CM

## 2017-02-01 DIAGNOSIS — G89.18 ACUTE POST-OPERATIVE PAIN: ICD-10-CM

## 2017-02-01 DIAGNOSIS — G89.29 OTHER CHRONIC PAIN: ICD-10-CM

## 2017-02-01 DIAGNOSIS — R19.7 DIARRHEA DUE TO MALABSORPTION: Primary | ICD-10-CM

## 2017-02-01 DIAGNOSIS — G89.4 CHRONIC PAIN SYNDROME: Primary | ICD-10-CM

## 2017-02-01 PROCEDURE — 99212 OFFICE O/P EST SF 10 MIN: CPT | Mod: ZF | Performed by: THORACIC SURGERY (CARDIOTHORACIC VASCULAR SURGERY)

## 2017-02-01 RX ORDER — OXYCODONE HCL 5 MG/5 ML
5-10 SOLUTION, ORAL ORAL EVERY 4 HOURS PRN
Qty: 500 ML | Refills: 0 | Status: SHIPPED | OUTPATIENT
Start: 2017-02-01 | End: 2017-04-12

## 2017-02-01 ASSESSMENT — PAIN SCALES - GENERAL: PAINLEVEL: SEVERE PAIN (7)

## 2017-02-01 NOTE — NURSING NOTE
Minerva Blanco is a 71 year old female who presents for:  Chief Complaint   Patient presents with     Oncology Clinic Visit     esophageal perferation followu p        Initial Vitals:  /67 mmHg  Pulse 68  Temp(Src) 96.5  F (35.8  C) (Tympanic)  Ht 1.524 m (5')  Wt 47.673 kg (105 lb 1.6 oz)  BMI 20.53 kg/m2  SpO2 99%  Breastfeeding? No Estimated body mass index is 20.53 kg/(m^2) as calculated from the following:    Height as of this encounter: 1.524 m (5').    Weight as of this encounter: 47.673 kg (105 lb 1.6 oz).. Body surface area is 1.42 meters squared. BP completed using cuff size: regular  Severe Pain (7) No LMP recorded. Patient is postmenopausal. Allergies and medications reviewed.     Medications: Medication refills not needed today.  Pharmacy name entered into Antria:    [facility] - [place], [place] - [address] [id number] - [place], [place] - [address] [facility] [address] [place] [facility] - [place], [place] - [address] [place] [address]    Comments: patient is in TCU, unable to verify medications.    7 minutes for nursing intake (face to face time)   Shae Irving [facility]

## 2017-02-01 NOTE — Clinical Note
2/1/2017      RE: Minerva Blanco  [address]  [place] [address]       THORACIC SURGERY FOLLOW UP VISIT    Dear Dr. Carroll,  I saw Mrs. Minerva Blanco in follow-up today. The clinical summary follows:     PREOP DIAGNOSIS   1.  Chronic esophageal perforation with mediastinal phlegmon.    2.  Status post fundoplication.      PROCEDURE   1/9/2017  1.  Esophagogastroscopy.    2.  Laparoscopic lysis of adhesions.    3.  Laparoscopic proximal gastrectomy and gastrostomy tube placement.    4.  Left thoracotomy with excision of mediastinal phlegmon and distal esophagectomy.    5.  Thoracic duct ligation.    6.  Right tube thoracostomy.    7.  Left esophageal spit fistula.    8.  Bronchoscopy.      PRIOR PROCEDURES  1) Multiple endoscopies and pharyngostomy tube placement x 2 (for drainage of paraesophageal cavity)  2) Esophageal stent placement, attempted placement of biliary stent into the paraesophageal cavity (7/22/2016)  3) Esophageal stent removal, placement of retrograde gastroesophageal tube (10/23/2016)  4) Toupet fundoplication (1/2016)      COMPLICATIONS  Thoracotomy wound infection requiring opening and antibiotics  INTERVAL STUDIES  None    TOB never  ETOH negative    SUBJECTIVE  Ms. Minerva Blanco feels well today. She reports that diarrhea has improved since she was started on tincture of opium yesterday. Over the weekend she was having about 12 BM overnight, which has now reduced to 5 BM. Pain is well controlled on current regimen. She denies issues with spit fistula and feels comfortable emptying bag. Has completed 10 day course of antibiotics for cellulitis.     From a personal perspective, she is very positive about her recovery and feels ready to discharge from TCU. Unfortunately, she just heard about her niece who passed away in a car accident. She would like to be home with family for this reason.     Thoracotomy incision c/d/i, without erythema, healing well  Spit  fistula patent and viable    IMPRESSION    71 year-old female 3 weeks S/P esophagectomy and spit fistula for end-stage GERD  Diarrhea secondary to tube feeds  Resolved wound infection    PLAN  I reviewed the plan as follows:  1) Rx for tincture of opium for diarrhea  2) Provided ostomy bag supplies, plan for home health care after d/c from TCU  3) Follow up in 1 month in clinic  Pain Control Plan: Prescribed 6 week Rx of oxycodone elixir.   They had all their questions answered and were in agreement with the plan.  I appreciate the opportunity to participate in the care of your patient and will keep you updated.    Sincerely,    Jens Wise MD

## 2017-02-01 NOTE — Clinical Note
2/1/2017       RE: Minerva Blanco  [address]  [place] [address]     Dear Colleague,    Thank you for referring your patient, Minerva Blanco, to the [facility]. Please see a copy of my visit note below.    THORACIC SURGERY FOLLOW UP VISIT    Dear Dr. Nino,  I saw Mrs. Minerva Blanco in follow-up today. The clinical summary follows:     PREOP DIAGNOSIS   1.  Chronic esophageal perforation with mediastinal phlegmon.    2.  Status post fundoplication.      PROCEDURE   1/9/2017  1.  Esophagogastroscopy.    2.  Laparoscopic lysis of adhesions.    3.  Laparoscopic proximal gastrectomy and gastrostomy tube placement.    4.  Left thoracotomy with excision of mediastinal phlegmon and distal esophagectomy.    5.  Thoracic duct ligation.    6.  Right tube thoracostomy.    7.  Left esophageal spit fistula.    8.  Bronchoscopy.      PRIOR PROCEDURES  1) Multiple endoscopies and pharyngostomy tube placement x 2 (for drainage of paraesophageal cavity)  2) Esophageal stent placement, attempted placement of biliary stent into the paraesophageal cavity (7/22/2016)  3) Esophageal stent removal, placement of retrograde gastroesophageal tube (10/23/2016)  4) Toupet fundoplication (1/2016)      COMPLICATIONS  Cellulitis of thoracotomy incision treated with antibiotics    INTERVAL STUDIES  none  ETOH no  TOB no  BMI***    SUBJECTIVE  ***    From a personal perspective, ***    IMPRESSION No diagnosis found.  71 year-old F 4 weeks s/p distal esophagectomy and spit fistula, proximal gastrectomy and gastrostomy tube placement.    PLAN  I spent a total of *** minutes with Mrs. Minerva Blanco and ***, more than 50% of which were spent in counseling, coordination of care, and face-to-face time. I reviewed the plan as follows:  1) ***  Necessary Tests & Appointments: ***  Pain Control Plan: ***    They had all their questions answered and were in agreement with the plan.  I appreciate  the opportunity to participate in the care of your patient and will keep you updated.  Sincerely,    Again, thank you for allowing me to participate in the care of your patient.      Sincerely,    Jens Wise MD

## 2017-02-02 ENCOUNTER — AMBULATORY - HEALTHEAST (OUTPATIENT)
Dept: GERIATRICS | Facility: CLINIC | Age: 72
End: 2017-02-02

## 2017-02-03 ENCOUNTER — TELEPHONE (OUTPATIENT)
Dept: ANESTHESIOLOGY | Facility: CLINIC | Age: 72
End: 2017-02-03

## 2017-02-03 DIAGNOSIS — K22.3 ESOPHAGEAL PERFORATION: Primary | ICD-10-CM

## 2017-02-03 NOTE — TELEPHONE ENCOUNTER
"LPN called patient to offer them an appointment, from a last minute cancellation.   Pt stated \"I haven't decided on if I am going to be needing your services or not yet. You can give the appointment to someone else.\"    LPN gave pt the clinic's contact information and asked that they call the clinic if they are needing to schedule.  Pt verbalized understanding.     Lexy Newell LPN    "

## 2017-02-06 DIAGNOSIS — I10 ESSENTIAL HYPERTENSION: Primary | ICD-10-CM

## 2017-02-06 DIAGNOSIS — E46 MALNUTRITION (H): ICD-10-CM

## 2017-02-06 DIAGNOSIS — I48.91 ATRIAL FIBRILLATION, UNSPECIFIED TYPE (H): ICD-10-CM

## 2017-02-06 RX ORDER — METOPROLOL TARTRATE 50 MG
TABLET ORAL
Qty: 60 TABLET | Refills: 3 | Status: ON HOLD | OUTPATIENT
Start: 2017-02-06 | End: 2017-05-09

## 2017-02-06 RX ORDER — AMIODARONE HYDROCHLORIDE 200 MG/1
TABLET ORAL
Qty: 30 TABLET | Refills: 1 | Status: SHIPPED | OUTPATIENT
Start: 2017-02-06 | End: 2017-02-22

## 2017-02-06 RX ORDER — UREA 10 %
LOTION (ML) TOPICAL
Qty: 30 TABLET | Refills: 3 | Status: ON HOLD | OUTPATIENT
Start: 2017-02-06 | End: 2017-04-28

## 2017-02-08 ENCOUNTER — PRE VISIT (OUTPATIENT)
Dept: GASTROENTEROLOGY | Facility: CLINIC | Age: 72
End: 2017-02-08

## 2017-02-08 NOTE — TELEPHONE ENCOUNTER
1.  Date/reason for appt: 3/20/17 -- malabsorption    2.  Referring provider: Sarina Serna    3.  Call to patient (Yes / No - short description): no, referred    4.  Previous care at / records requested from: [facility] -- records and imaging in epic/pacs

## 2017-02-17 DIAGNOSIS — G89.29 OTHER CHRONIC PAIN: ICD-10-CM

## 2017-02-17 RX ORDER — GABAPENTIN 250 MG/5ML
400 SOLUTION ORAL EVERY 8 HOURS
Qty: 250 ML | Refills: 1 | Status: SHIPPED | OUTPATIENT
Start: 2017-02-17 | End: 2017-03-22

## 2017-02-21 ENCOUNTER — PRE VISIT (OUTPATIENT)
Dept: CARDIOLOGY | Facility: CLINIC | Age: 72
End: 2017-02-21

## 2017-02-21 DIAGNOSIS — I48.0 PAROXYSMAL ATRIAL FIBRILLATION (H): Primary | ICD-10-CM

## 2017-02-22 ENCOUNTER — ANTICOAGULATION THERAPY VISIT (OUTPATIENT)
Dept: ANTICOAGULATION | Facility: CLINIC | Age: 72
End: 2017-02-22

## 2017-02-22 ENCOUNTER — OFFICE VISIT (OUTPATIENT)
Dept: SURGERY | Facility: CLINIC | Age: 72
End: 2017-02-22
Attending: THORACIC SURGERY (CARDIOTHORACIC VASCULAR SURGERY)
Payer: MEDICARE

## 2017-02-22 ENCOUNTER — OFFICE VISIT (OUTPATIENT)
Dept: CARDIOLOGY | Facility: CLINIC | Age: 72
End: 2017-02-22
Attending: NURSE PRACTITIONER
Payer: MEDICARE

## 2017-02-22 VITALS
BODY MASS INDEX: 19.36 KG/M2 | HEART RATE: 45 BPM | WEIGHT: 98.6 LBS | SYSTOLIC BLOOD PRESSURE: 117 MMHG | DIASTOLIC BLOOD PRESSURE: 61 MMHG | HEIGHT: 60 IN | OXYGEN SATURATION: 98 %

## 2017-02-22 VITALS
BODY MASS INDEX: 19.14 KG/M2 | HEART RATE: 45 BPM | WEIGHT: 98 LBS | DIASTOLIC BLOOD PRESSURE: 61 MMHG | OXYGEN SATURATION: 98 % | SYSTOLIC BLOOD PRESSURE: 117 MMHG

## 2017-02-22 DIAGNOSIS — I48.0 PAROXYSMAL ATRIAL FIBRILLATION (H): ICD-10-CM

## 2017-02-22 DIAGNOSIS — K52.9 CHRONIC DIARRHEA: Primary | ICD-10-CM

## 2017-02-22 DIAGNOSIS — E46 MALNUTRITION (H): ICD-10-CM

## 2017-02-22 DIAGNOSIS — Z90.49 H/O ESOPHAGECTOMY: ICD-10-CM

## 2017-02-22 DIAGNOSIS — K21.9 GASTROESOPHAGEAL REFLUX DISEASE WITHOUT ESOPHAGITIS: ICD-10-CM

## 2017-02-22 DIAGNOSIS — Z79.01 LONG-TERM (CURRENT) USE OF ANTICOAGULANTS: ICD-10-CM

## 2017-02-22 DIAGNOSIS — Z98.890 H/O ESOPHAGECTOMY: ICD-10-CM

## 2017-02-22 DIAGNOSIS — I48.91 ATRIAL FIBRILLATION (H): ICD-10-CM

## 2017-02-22 PROCEDURE — 99212 OFFICE O/P EST SF 10 MIN: CPT | Mod: ZF

## 2017-02-22 PROCEDURE — 93005 ELECTROCARDIOGRAM TRACING: CPT | Mod: ZF

## 2017-02-22 PROCEDURE — 84134 ASSAY OF PREALBUMIN: CPT | Performed by: CLINICAL NURSE SPECIALIST

## 2017-02-22 PROCEDURE — 99214 OFFICE O/P EST MOD 30 MIN: CPT | Mod: ZP | Performed by: NURSE PRACTITIONER

## 2017-02-22 PROCEDURE — 93010 ELECTROCARDIOGRAM REPORT: CPT | Mod: ZP | Performed by: INTERNAL MEDICINE

## 2017-02-22 RX ORDER — WARFARIN SODIUM 2.5 MG/1
2.5 TABLET ORAL DAILY
Qty: 30 TABLET | Refills: 1 | Status: ON HOLD | OUTPATIENT
Start: 2017-02-22 | End: 2017-04-27

## 2017-02-22 ASSESSMENT — PAIN SCALES - GENERAL
PAINLEVEL: NO PAIN (0)
PAINLEVEL: NO PAIN (0)

## 2017-02-22 NOTE — PROGRESS NOTES
HPI:   Ms. Blanco is a 72 year old female who has a past medical history significant for MS, Meniere's disease (deaf in left ear), breast CA s/p right sided lumpectomy chemo and radiation, GERD, hiatal hernia repair complicated by by esophageal perforation s/p laparoscopic resection of proximal stomach, left thoracotomy, distal esophagectomy, thoracic duct ligation, drainage of left chest abscess, and spit fistula creation in 11/2016. She had PAF post operatively. Cardiology was consulted in the hospital and recommended increased BB, short course of amiodarone and starting anticoagulation with warfarin. She converted to NSR in the hospital and was discharged on Lovenox by her primary team. No prior history of AF. She presents today for follow up after her hospitalization. She reports no known AF episodes. She endorses ongoing generalized fatigue and decreased functional capacity. She denies any chest pain, dizziness, lightheadedness, shortness of breath, palpitations, or syncopal symptoms. Presenting 12 lead ECG shows SB Vent Rate 45 bpm,  ms, QRS 92 ms, QTc 467 ms. Current cardiac medications include: Metoprolol, Amiodarone, and Lovenox.     PAST MEDICAL HISTORY:  Past Medical History   Diagnosis Date     Breast cancer (H) 2007     right side - lumpectomy, chemo, and local radiation     Deaf      left ear     Esophageal perforation      Fibromyalgia      GERD (gastroesophageal reflux disease)      Hiatal hernia      History of blood transfusion      IBS (irritable bowel syndrome)      Meniere's disease      deaf left ear     MS (multiple sclerosis) (H)        CURRENT MEDICATIONS:  Current Outpatient Prescriptions   Medication Sig Dispense Refill     warfarin (COUMADIN) 2.5 MG tablet Take 1 tablet (2.5 mg) by mouth daily (Patient not taking: Reported on 2/22/2017) 30 tablet 1     enoxaparin (LOVENOX) 40 MG/0.4ML injection Inject 0.45 mLs (45 mg) Subcutaneous 2 times daily for 6 days 5.4 mL 0     gabapentin  "(NEURONTIN) 250 MG/5ML solution 8 mLs (400 mg) by Per G Tube route every 8 hours 250 mL 1     metoprolol (LOPRESSOR) 50 MG tablet 50 mg BID.  May crush, mix with water and administer via feeding tube 60 tablet 3     Zinc Sulfate 220 (50 ZN) MG TABS 220 mg daily.  OK to crush, mix with water and administer via feeding tube. 30 tablet 3     oxyCODONE (ROXICODONE) 5 MG/5ML solution Take 5-10 mLs (5-10 mg) by mouth every 4 hours as needed for moderate to severe pain 500 mL 0     traZODone (DESYREL) 100 MG tablet 1 tablet (100 mg) by Per G Tube route At Bedtime 30 tablet      loperamide (IMODIUM) 1 MG/5ML liquid Take 20 mLs (4 mg) by mouth 4 times daily as needed for diarrhea 200 mL      multivitamins with minerals (CERTAVITE/CEROVITE) LIQD liquid Take 10 mLs by mouth daily       fiber modular (NUTRISOURCE FIBER) packet 1 packet by Per Feeding Tube route 2 times daily       Lactobacillus Rhamnosus, GG, (CULTURELLE PO) Take 1 tablet by mouth 2 times daily        [DISCONTINUED] metoprolol (LOPRESSOR) 10 mg/mL 5 mLs (50 mg) by Per G Tube route 2 times daily         PAST SURGICAL HISTORY:  Past Surgical History   Procedure Laterality Date     Esophagoscopy, gastroscopy, duodenoscopy (egd), combined N/A 4/18/2016     Procedure: COMBINED ESOPHAGOSCOPY, GASTROSCOPY, DUODENOSCOPY (EGD);  Surgeon: Alexis Barraza MD;  [facility]: [facility]     Pharyngostomy N/A 4/18/2016     Procedure: PHARYNGOSTOMY;  Surgeon: Alexis Barraza MD;  [facility]     Esophagoscopy, gastroscopy, duodenoscopy (egd), combined N/A 4/25/2016     Procedure: COMBINED ESOPHAGOSCOPY, GASTROSCOPY, DUODENOSCOPY (EGD);  Surgeon: Alexis Barraza MD;  [facility]     Pharyngostomy N/A 4/25/2016     Procedure: PHARYNGOSTOMY;  Surgeon: Alexis Barraza MD;  [facility]     Appendectomy       Thoracotomy Right 1998     lung infection - \"hard crust formed on lung\"     Back surgery  5/1/2015     lumbar fusion     Wrist " surgery       Nissen fundoplication  1/2016     C gastrostomy/jejun tube       J tube for feedings     Esophagoscopy, gastroscopy, duodenoscopy (egd), combined N/A 5/4/2016     Procedure: COMBINED ESOPHAGOSCOPY, GASTROSCOPY, DUODENOSCOPY (EGD);  Surgeon: Alexis Barraza MD;  [facility]     Pharyngostomy N/A 5/4/2016     Procedure: PHARYNGOSTOMY;  Surgeon: Alexis Barraza MD;  [facility]     Esophagoscopy, gastroscopy, duodenoscopy (egd), combined N/A 5/18/2016     Procedure: COMBINED ESOPHAGOSCOPY, GASTROSCOPY, DUODENOSCOPY (EGD);  Surgeon: Alexis Barraza MD;  [facility]     Esophagoscopy, gastroscopy, duodenoscopy (egd), combined N/A 6/22/2016     Procedure: COMBINED ESOPHAGOSCOPY, GASTROSCOPY, DUODENOSCOPY (EGD);  Surgeon: Alexis Barraza MD;  [facility]     Pharyngostomy N/A 6/22/2016     Procedure: PHARYNGOSTOMY;  Surgeon: Alexis Barraza MD;  [facility]     Esophagoscopy, gastroscopy, duodenoscopy (egd), dilatation, combined N/A 6/29/2016     Procedure: COMBINED ESOPHAGOSCOPY, GASTROSCOPY, DUODENOSCOPY (EGD), DILATATION;  Surgeon: Jens Wise MD;  [facility]     Irrigation and debridement chest washout, combined N/A 6/29/2016     Procedure: COMBINED IRRIGATION AND DEBRIDEMENT CHEST WASHOUT;  Surgeon: Jens Wise MD;  [facility]     Pharyngostomy N/A 6/29/2016     Procedure: PHARYNGOSTOMY;  Surgeon: Jens Wise MD;  [facility]     Hc ugi endoscopy w transendoscopic stent placement N/A 7/12/2016     Procedure: COMBINED ESOPHAGOSCOPY, GASTROSCOPY, DUODENOSCOPY (EGD), PLACE TRANSENDOSCOPIC ESOPHAGEAL STENT;  Surgeon: Jens Wise MD;  [facility]     Esophagoscopy, gastroscopy, duodenoscopy (egd), combined N/A 7/12/2016     Procedure: COMBINED ESOPHAGOSCOPY, GASTROSCOPY, DUODENOSCOPY (EGD);  Surgeon: Jens Wise MD;  [facility]      ugi  endoscopy w transendoscopic stent placement N/A 7/22/2016     Procedure: COMBINED ESOPHAGOSCOPY, GASTROSCOPY, DUODENOSCOPY (EGD), PLACE TRANSENDOSCOPIC ESOPHAGEAL STENT;  Surgeon: Jens Wise MD;  [facility]     Picc insertion Left 8/25/2016     5fr DL BioFlo PICC, 42cm (3cm external) in the L medial brachial vein w/ tip in the SVC RA junction.     Combined esophagoscopy, gastroscopy, duodenoscopy (egd), remove esophageal stent N/A 8/26/2016     Procedure: COMBINED ESOPHAGOSCOPY, GASTROSCOPY, DUODENOSCOPY (EGD), REMOVE ESOPHAGEAL STENT;  Surgeon: Jens Wise MD;  [facility]     Nerve block peripheral N/A 8/30/2016     Procedure: NERVE BLOCK PERIPHERAL;  Surgeon: GENERIC ANESTHESIA PROVIDER;  Location: [facility]     Lumpectomy breast Right 2007     Laparoscopy diagnostic (general) N/A 1/9/2017     Procedure: LAPAROSCOPY DIAGNOSTIC (GENERAL);  Surgeon: Jens Wise MD;  [facility]     Thoracotomy Left 1/9/2017     Procedure: THORACOTOMY;  Surgeon: Jens Wise MD;  [facility]     Esophagectomy N/A 1/9/2017     Procedure: ESOPHAGECTOMY;  Surgeon: Jens Wise MD;  [facility]     Create spit fistula N/A 1/9/2017     Procedure: CREATE SPIT FISTULA;  Surgeon: Jens Wise MD;  [facility]       ALLERGIES:     Allergies   Allergen Reactions     Amoxicillin Diarrhea     Ativan [Lorazepam] Other (See Comments)     Hallucinations     Hydromorphone Itching     Morphine Itching       FAMILY HISTORY:  Family History   Problem Relation Age of Onset     Alzheimer Disease Mother      CEREBROVASCULAR DISEASE Father      cerebral hemorrhage     Ovarian Cancer Sister      Breast Cancer Daughter        SOCIAL HISTORY:  Social History   Substance Use Topics     Smoking status: Former Smoker     Packs/day: 1.00     Years: 15.00     Types: Cigarettes     Quit date: 1/1/1998     Smokeless tobacco: Not on file     Alcohol use No        ROS:   A comprehensive 10 point review of systems negative other than as mentioned in HPI.  Exam:  /61 (BP Location: Left arm, Patient Position: Chair, Cuff Size: Adult Small)  Pulse (!) 45  Ht 1.524 m (5')  Wt 44.7 kg (98 lb 9.6 oz)  SpO2 98%  BMI 19.26 kg/m2  GENERAL APPEARANCE: alert and no distress  HEENT: no icterus, no xanthelasmas, normal pupil size and reaction, normal palate, mucosa moist, no central cyanosis  NECK: no adenopathy, no asymmetry, masses, or scars, thyroid normal to palpation and no bruits, JVP not elevated  RESPIRATORY: lungs clear to auscultation - no rales, rhonchi or wheezes, no use of accessory muscles, no retractions, respirations are unlabored, normal respiratory rate  CARDIOVASCULAR: regular rhythm, normal S1 with physiologic split S2, no S3 or S4 and no murmur, click or rub, precordium quiet with normal PMI.  ABDOMEN: soft, non tender, bowel sounds normal,   EXTREMITIES: peripheral pulses normal, no edema, no bruits  NEURO: alert and oriented to person/place/time, normal speech, gait and affect  SKIN: no ecchymoses, no rashes    Labs:  Reviewed.     Testing/Procedures:  PULMONARY FUNCTION TESTS:   PFT Latest Ref Rng & Units 1/4/2017   FVC L 2.10   FEV1 L 1.30   FVC% % 85   FEV1% % 68         1/2017 ECHOCARDIOGRAM:   Interpretation Summary  Technically difficult study.  Global and regional left ventricular function is hyperdynamic with an LVEF  >70%.  The right ventricular function is hyperdynamic.  Mild pulmonary hypertension is present.  The inferior vena cava is normal. The estimated mean right atrial pressure is  <3 mmHg.  No pericardial effusion is present.  Previous study not available for comparison.      Assessment and Plan:   Ms. Blanco is a 72 year old female who has a past medical history significant for MS, Meniere's disease (deaf in left ear), breast CA s/p right sided lumpectomy chemo and radiation, GERD, hiatal hernia repair complicated by by esophageal  perforation s/p laparoscopic resection of proximal stomach, left thoracotomy, distal esophagectomy, thoracic duct ligation, drainage of left chest abscess, and spit fistula creation in 2016. She had PAF post operatively. Cardiology was consulted in the hospital and recommended increased BB, short course of amiodarone and starting anticoagulation with warfarin. She converted to NSR in the hospital and was discharged on Lovenox by her primary team. No prior history of AF. She presents today for follow up after her hospitalization. She reports no known AF episodes. She endorses ongoing generalized fatigue and decreased functional capacity. She denies any chest pain, dizziness, lightheadedness, shortness of breath, palpitations, or syncopal symptoms. Presenting 12 lead ECG shows SB Vent Rate 45 bpm,  ms, QRS 92 ms, QTc 467 ms. Current cardiac medications include: Metoprolol, Amiodarone, and Lovenox.     We discussed in detail with the patient management/treatment options for A.fib includin. Stroke Prophylaxis:  CHADSVASC=+age, +gender  2, corresponding to a 2.2% annual stroke / systemic emolism event rate. indicating need for long term oral anticoagulation.  She was discharged on Lovenox injections. We will have her transition to warfarin with goal INR 2-3. She can stop Lovenox when INR gets to be 1.8 or greater. We can consider stopping anticoagulation if no further AF episodes given only documented episode occurred post operatively.   2. Rate Control: Continue Metoprolol.  Will consider decreasing if she continues to have bradycardia after stopping amiodarone.   3. Rhythm Control: She has been on amiodarone with goal for short course. We will have her stop amiodarone given no prior history of AF. We will have her undergo a 2 week zio patch monitor in 6-8 weeks after stopping amiodarone to monitor for any recurrences as well as for chronotropic competence given her bradycardia, fatigue, and report of  decreased functional capacitiy .   4. Risk Factor Management: Maintain tight BP control, and CHARLIE evaluation as indicated.    Follow up to be determined based on results.     The patient states understanding and is agreeable with plan.   This patient was seen and evaluated with Dr. Moreno. The above note reflects our joint assessment and plan.   CLIVE Black CNP  Pager: [phone]  CC  [facility] AL[facility]O

## 2017-02-22 NOTE — NURSING NOTE
Minerva Blanco is a 72 year old female who presents for:  Chief Complaint   Patient presents with     Oncology Clinic Visit     Return: Esnely retana        Initial Vitals:  /61 (BP Location: Left arm)  Pulse (!) 45  Wt 44.5 kg (98 lb)  SpO2 98%  BMI 19.14 kg/m2 Estimated body mass index is 19.14 kg/(m^2) as calculated from the following:    Height as of an earlier encounter on 2/22/17: 1.524 m (5').    Weight as of this encounter: 44.5 kg (98 lb).. Body surface area is 1.37 meters squared. BP completed using cuff size: BP was taken in LAB INTAKE  No Pain (0) No LMP recorded. Patient is postmenopausal. Allergies and medications reviewed.     Medications: Medication refills not needed today.  Pharmacy name entered into [facility]: [id number] - [place], [place] - [address]    Comments: Patient received flu vaccine. See Immunizations.  Sandee Ryan CMA        6 minutes for nursing intake (face to face time)   Sandee Ryan CMA

## 2017-02-22 NOTE — PATIENT INSTRUCTIONS
Stop Amiodarone    Start Warfarin 2.5 mg daily. We will arrange a blood test in 3-4 days    You can stop Lovenox injections when INR (blood test for Warfarin) is 1.8 or greater    We will mail you the heart monitor to wear in 1-2 months   Cardiovascular Clinic:   [address] [place] [address]. [place] [address]  Your Care Team:  [facility]   Telephone Number     Sandra Moreno [phone]   Toña Khan RN  [phone]     For scheduling appts or procedures:    Angela Reyes   [phone]     As always, Thank you for trusting us with your health care needs!

## 2017-02-22 NOTE — LETTER
2/22/2017      RE: Minerva Blanco  [address] [facility] [address]  [place] [address]       Dear Colleague,    Thank you for the opportunity to participate in the care of your patient, Minerva Blanco, at the [facility]. Please see a copy of my visit note below.    HPI:   Ms. Blanco is a 72 year old female who has a past medical history significant for MS, Meniere's disease (deaf in left ear), breast CA s/p right sided lumpectomy chemo and radiation, GERD, hiatal hernia repair complicated by by esophageal perforation s/p laparoscopic resection of proximal stomach, left thoracotomy, distal esophagectomy, thoracic duct ligation, drainage of left chest abscess, and spit fistula creation in 11/2016. She had PAF post operatively. Cardiology was consulted in the hospital and recommended increased BB, short course of amiodarone and starting anticoagulation with warfarin. She converted to NSR in the hospital and was discharged on Lovenox by her primary team. No prior history of AF. She presents today for follow up after her hospitalization. She reports no known AF episodes. She endorses ongoing generalized fatigue and decreased functional capacity. She denies any chest pain, dizziness, lightheadedness, shortness of breath, palpitations, or syncopal symptoms. Presenting 12 lead ECG shows SB Vent Rate 45 bpm,  ms, QRS 92 ms, QTc 467 ms. Current cardiac medications include: Metoprolol, Amiodarone, and Lovenox.     PAST MEDICAL HISTORY:  Past Medical History   Diagnosis Date     Breast cancer (H) 2007     right side - lumpectomy, chemo, and local radiation     Deaf      left ear     Esophageal perforation      Fibromyalgia      GERD (gastroesophageal reflux disease)      Hiatal hernia      History of blood transfusion      IBS (irritable bowel syndrome)      Meniere's disease      deaf left ear     MS (multiple sclerosis) (H)        CURRENT  MEDICATIONS:  Current Outpatient Prescriptions   Medication Sig Dispense Refill     warfarin (COUMADIN) 2.5 MG tablet Take 1 tablet (2.5 mg) by mouth daily (Patient not taking: Reported on 2/22/2017) 30 tablet 1     enoxaparin (LOVENOX) 40 MG/0.4ML injection Inject 0.45 mLs (45 mg) Subcutaneous 2 times daily for 6 days 5.4 mL 0     gabapentin (NEURONTIN) 250 MG/5ML solution 8 mLs (400 mg) by Per G Tube route every 8 hours 250 mL 1     metoprolol (LOPRESSOR) 50 MG tablet 50 mg BID.  May crush, mix with water and administer via feeding tube 60 tablet 3     Zinc Sulfate 220 (50 ZN) MG TABS 220 mg daily.  OK to crush, mix with water and administer via feeding tube. 30 tablet 3     oxyCODONE (ROXICODONE) 5 MG/5ML solution Take 5-10 mLs (5-10 mg) by mouth every 4 hours as needed for moderate to severe pain 500 mL 0     traZODone (DESYREL) 100 MG tablet 1 tablet (100 mg) by Per G Tube route At Bedtime 30 tablet      loperamide (IMODIUM) 1 MG/5ML liquid Take 20 mLs (4 mg) by mouth 4 times daily as needed for diarrhea 200 mL      multivitamins with minerals (CERTAVITE/CEROVITE) LIQD liquid Take 10 mLs by mouth daily       fiber modular (NUTRISOURCE FIBER) packet 1 packet by Per Feeding Tube route 2 times daily       Lactobacillus Rhamnosus, GG, (CULTURELLE PO) Take 1 tablet by mouth 2 times daily        [DISCONTINUED] metoprolol (LOPRESSOR) 10 mg/mL 5 mLs (50 mg) by Per G Tube route 2 times daily         PAST SURGICAL HISTORY:  Past Surgical History   Procedure Laterality Date     Esophagoscopy, gastroscopy, duodenoscopy (egd), combined N/A 4/18/2016     Procedure: COMBINED ESOPHAGOSCOPY, GASTROSCOPY, DUODENOSCOPY (EGD);  Surgeon: Alexis Barraza MD;  [facility]     Pharyngostomy N/A 4/18/2016     Procedure: PHARYNGOSTOMY;  Surgeon: Alexis Barraza MD;  [facility]     Esophagoscopy, gastroscopy, duodenoscopy (egd), combined N/A 4/25/2016     Procedure: COMBINED ESOPHAGOSCOPY, GASTROSCOPY,  "DUODENOSCOPY (EGD);  Surgeon: Alexis Barraza MD;  [facility]     Pharyngostomy N/A 4/25/2016     Procedure: PHARYNGOSTOMY;  Surgeon: Alexis Barraza MD;  [facility]     Appendectomy       Thoracotomy Right 1998     lung infection - \"hard crust formed on lung\"     Back surgery  5/1/2015     lumbar fusion     Wrist surgery       Nissen fundoplication  1/2016     C gastrostomy/jejun tube       J tube for feedings     Esophagoscopy, gastroscopy, duodenoscopy (egd), combined N/A 5/4/2016     Procedure: COMBINED ESOPHAGOSCOPY, GASTROSCOPY, DUODENOSCOPY (EGD);  Surgeon: Alexis Barraza MD;  [facility]     Pharyngostomy N/A 5/4/2016     Procedure: PHARYNGOSTOMY;  Surgeon: Alexis Barraza MD;  [facility]     Esophagoscopy, gastroscopy, duodenoscopy (egd), combined N/A 5/18/2016     Procedure: COMBINED ESOPHAGOSCOPY, GASTROSCOPY, DUODENOSCOPY (EGD);  Surgeon: Alexis Barraza MD;  [facility]     Esophagoscopy, gastroscopy, duodenoscopy (egd), combined N/A 6/22/2016     Procedure: COMBINED ESOPHAGOSCOPY, GASTROSCOPY, DUODENOSCOPY (EGD);  Surgeon: Alexis Barraza MD;  [facility]     Pharyngostomy N/A 6/22/2016     Procedure: PHARYNGOSTOMY;  Surgeon: Alexis Barraza MD;  [facility]     Esophagoscopy, gastroscopy, duodenoscopy (egd), dilatation, combined N/A 6/29/2016     Procedure: COMBINED ESOPHAGOSCOPY, GASTROSCOPY, DUODENOSCOPY (EGD), DILATATION;  Surgeon: Jens Wise MD;  [facility]     Irrigation and debridement chest washout, combined N/A 6/29/2016     Procedure: COMBINED IRRIGATION AND DEBRIDEMENT CHEST WASHOUT;  Surgeon: Jens Wise MD;  [facility]     Pharyngostomy N/A 6/29/2016     Procedure: PHARYNGOSTOMY;  Surgeon: Jens Wise MD;  [facility]      ugi endoscopy w transendoscopic stent placement N/A 7/12/2016     Procedure: COMBINED ESOPHAGOSCOPY, " GASTROSCOPY, DUODENOSCOPY (EGD), PLACE TRANSENDOSCOPIC ESOPHAGEAL STENT;  Surgeon: Jens Wise MD;  [facility]     Esophagoscopy, gastroscopy, duodenoscopy (egd), combined N/A 7/12/2016     Procedure: COMBINED ESOPHAGOSCOPY, GASTROSCOPY, DUODENOSCOPY (EGD);  Surgeon: Jens Wise MD;  [facility]     Hc ugi endoscopy w transendoscopic stent placement N/A 7/22/2016     Procedure: COMBINED ESOPHAGOSCOPY, GASTROSCOPY, DUODENOSCOPY (EGD), PLACE TRANSENDOSCOPIC ESOPHAGEAL STENT;  Surgeon: Jens Wise MD;  [facility]     Picc insertion Left 8/25/2016     5fr DL BioFlo PICC, 42cm (3cm external) in the L medial brachial vein w/ tip in the SVC RA junction.     Combined esophagoscopy, gastroscopy, duodenoscopy (egd), remove esophageal stent N/A 8/26/2016     Procedure: COMBINED ESOPHAGOSCOPY, GASTROSCOPY, DUODENOSCOPY (EGD), REMOVE ESOPHAGEAL STENT;  Surgeon: Jens Wise MD;  [facility]     Nerve block peripheral N/A 8/30/2016     Procedure: NERVE BLOCK PERIPHERAL;  Surgeon: GENERIC ANESTHESIA PROVIDER;  Location: [facility]     Lumpectomy breast Right 2007     Laparoscopy diagnostic (general) N/A 1/9/2017     Procedure: LAPAROSCOPY DIAGNOSTIC (GENERAL);  Surgeon: Jens Wise MD;  [facility]     Thoracotomy Left 1/9/2017     Procedure: THORACOTOMY;  Surgeon: Jens Wise MD;  [facility]     Esophagectomy N/A 1/9/2017     Procedure: ESOPHAGECTOMY;  Surgeon: Jens Wise MD;  [facility]     Create spit fistula N/A 1/9/2017     Procedure: CREATE SPIT FISTULA;  Surgeon: Jens Wise MD;  [facility]       ALLERGIES:     Allergies   Allergen Reactions     Amoxicillin Diarrhea     Ativan [Lorazepam] Other (See Comments)     Hallucinations     Hydromorphone Itching     Morphine Itching       FAMILY HISTORY:  Family History   Problem Relation Age of Onset     Alzheimer Disease Mother       CEREBROVASCULAR DISEASE Father      cerebral hemorrhage     Ovarian Cancer Sister      Breast Cancer Daughter        SOCIAL HISTORY:  Social History   Substance Use Topics     Smoking status: Former Smoker     Packs/day: 1.00     Years: 15.00     Types: Cigarettes     Quit date: 1/1/1998     Smokeless tobacco: Not on file     Alcohol use No       ROS:   A comprehensive 10 point review of systems negative other than as mentioned in HPI.  Exam:  /61 (BP Location: Left arm, Patient Position: Chair, Cuff Size: Adult Small)  Pulse (!) 45  Ht 1.524 m (5')  Wt 44.7 kg (98 lb 9.6 oz)  SpO2 98%  BMI 19.26 kg/m2  GENERAL APPEARANCE: alert and no distress  HEENT: no icterus, no xanthelasmas, normal pupil size and reaction, normal palate, mucosa moist, no central cyanosis  NECK: no adenopathy, no asymmetry, masses, or scars, thyroid normal to palpation and no bruits, JVP not elevated  RESPIRATORY: lungs clear to auscultation - no rales, rhonchi or wheezes, no use of accessory muscles, no retractions, respirations are unlabored, normal respiratory rate  CARDIOVASCULAR: regular rhythm, normal S1 with physiologic split S2, no S3 or S4 and no murmur, click or rub, precordium quiet with normal PMI.  ABDOMEN: soft, non tender, bowel sounds normal,   EXTREMITIES: peripheral pulses normal, no edema, no bruits  NEURO: alert and oriented to person/place/time, normal speech, gait and affect  SKIN: no ecchymoses, no rashes    Labs:  Reviewed.     Testing/Procedures:  PULMONARY FUNCTION TESTS:   PFT Latest Ref Rng & Units 1/4/2017   FVC L 2.10   FEV1 L 1.30   FVC% % 85   FEV1% % 68         1/2017 ECHOCARDIOGRAM:   Interpretation Summary  Technically difficult study.  Global and regional left ventricular function is hyperdynamic with an LVEF  >70%.  The right ventricular function is hyperdynamic.  Mild pulmonary hypertension is present.  The inferior vena cava is normal. The estimated mean right atrial pressure is  <3 mmHg.  No  pericardial effusion is present.  Previous study not available for comparison.      Assessment and Plan:   Ms. Blanco is a 72 year old female who has a past medical history significant for MS, Meniere's disease (deaf in left ear), breast CA s/p right sided lumpectomy chemo and radiation, GERD, hiatal hernia repair complicated by by esophageal perforation s/p laparoscopic resection of proximal stomach, left thoracotomy, distal esophagectomy, thoracic duct ligation, drainage of left chest abscess, and spit fistula creation in 2016. She had PAF post operatively. Cardiology was consulted in the hospital and recommended increased BB, short course of amiodarone and starting anticoagulation with warfarin. She converted to NSR in the hospital and was discharged on Lovenox by her primary team. No prior history of AF. She presents today for follow up after her hospitalization. She reports no known AF episodes. She endorses ongoing generalized fatigue and decreased functional capacity. She denies any chest pain, dizziness, lightheadedness, shortness of breath, palpitations, or syncopal symptoms. Presenting 12 lead ECG shows SB Vent Rate 45 bpm,  ms, QRS 92 ms, QTc 467 ms. Current cardiac medications include: Metoprolol, Amiodarone, and Lovenox.     We discussed in detail with the patient management/treatment options for Dalia includin. Stroke Prophylaxis:  CHADSVASC=+age, +gender  2, corresponding to a 2.2% annual stroke / systemic emolism event rate. indicating need for long term oral anticoagulation.  She was discharged on Lovenox injections. We will have her transition to warfarin with goal INR 2-3. She can stop Lovenox when INR gets to be 1.8 or greater. We can consider stopping anticoagulation if no further AF episodes given only documented episode occurred post operatively.   2. Rate Control: Continue Metoprolol.  Will consider decreasing if she continues to have bradycardia after stopping amiodarone.   3.  Rhythm Control: She has been on amiodarone with goal for short course. We will have her stop amiodarone given no prior history of AF. We will have her undergo a 2 week zio patch monitor in 6-8 weeks after stopping amiodarone to monitor for any recurrences as well as for chronotropic competence given her bradycardia, fatigue, and report of decreased functional capacitiy .   4. Risk Factor Management: Maintain tight BP control, and CHARLIE evaluation as indicated.    Follow up to be determined based on results.     The patient states understanding and is agreeable with plan.   This patient was seen and evaluated with Dr. Moreno. The above note reflects our joint assessment and plan.   CLIVE Black CNP  Pager: [phone]  CC  CECE ALFARO        Please do not hesitate to contact me if you have any questions/concerns.     Sincerely,     CLIVE Mcknight CNP

## 2017-02-22 NOTE — LETTER
2/22/2017       RE: Minerva Blanco  [address] [facility] [address]  [place] [address]     Dear Colleague,    Thank you for referring your patient, Minerva Blanco, to the [facility]. Please see a copy of my visit note below.    THORACIC SURGERY FOLLOW UP VISIT     Dear Dr. Carroll,  I saw Mrs. Minerva Blanco in follow-up today. The clinical summary follows:      PREOP DIAGNOSIS   1.  Chronic esophageal perforation with mediastinal phlegmon.    2.  Status post fundoplication.       PROCEDURE   1/9/2017  1.  Esophagogastroscopy.    2.  Laparoscopic lysis of adhesions.    3.  Laparoscopic proximal gastrectomy and gastrostomy tube placement.    4.  Left thoracotomy with excision of mediastinal phlegmon and distal esophagectomy.    5.  Thoracic duct ligation.    6.  Right tube thoracostomy.    7.  Left esophageal spit fistula.    8.  Bronchoscopy.       PRIOR PROCEDURES  1) Multiple endoscopies and pharyngostomy tube placement x 2 (for drainage of paraesophageal cavity)  2) Esophageal stent placement, attempted placement of biliary stent into the paraesophageal cavity (7/22/2016)  3) Esophageal stent removal, placement of retrograde gastroesophageal tube (10/23/2016)  4) Toupet fundoplication (1/2016)        COMPLICATIONS  Thoracotomy wound infection requiring antibiotics    INTERVAL STUDIES  None     TOB never  ETOH negative     SUBJECTIVE  Ms. Blanco was discharged from TCU 3 weeks ago and is doing well at home. Continues to have 2-3 episodes of loose diarrhea overnight. Working with nutritionist to optimize tube feeds, however has had 7 lb weight loss since last visit due to lower calorie formula.     From a personal perspective, she is here with her  Enrique.     Incisions well healed  Spit fistula patent and viable     IMPRESSION   (K52.9) Chronic diarrhea  (primary encounter diagnosis)  (E46) Malnutrition (H)  (Z90.49) H/O esophagectomy  (K21.9) Gastroesophageal reflux disease  without esophagitis       71 year-old female S/P esophagectomy and spit fistula for end-stage GERD  Diarrhea secondary to tube feeds  Resolved wound infection     PLAN  I reviewed the plan as follows:  1) Rx for tincture of opium for diarrhea  2) Follow up in 1 month with nutrition labs  They had all their questions answered and were in agreement with the plan.  I appreciate the opportunity to participate in the care of your patient and will keep you updated.  Sincerely,    Jens Wise MD

## 2017-02-22 NOTE — MR AVS SNAPSHOT
After Visit Summary   2/22/2017    Minerva Blanco    MRN: [id number]           Patient Information     Date Of Birth          1945        Visit Information        Provider Department      2/22/2017 10:30 AM Sandra Rivas APRN CNP [facility]        Today's Diagnoses     Paroxysmal atrial fibrillation (H)          Care Instructions        Stop Amiodarone    Start Warfarin 2.5 mg daily. We will arrange a blood test in 3-4 days    You can stop Lovenox injections when INR (blood test for Warfarin) is 1.8 or greater    We will mail you the heart monitor to wear in 1-2 months  Cardiovascular Clinic:   [address] [place] [address]. [place] [address]  Your Care Team:  [facility]   Telephone Number     Sandra Rivas NP  Dr. Pedro Moreno [phone]   Toña Khan RN  [phone]     For scheduling appts or procedures:    Angela Reyes   [phone]     As always, Thank you for trusting us with your health care needs!          Follow-ups after your visit        Your next 10 appointments already scheduled     Feb 22, 2017 12:15 PM CST   (Arrive by 12:00 PM)   Return Visit with Jens Wise MD   [facility] ([facility])    [address] [place] [id number] [phone]              Who to contact     If you have questions or need follow up information about today's clinic visit or your schedule please contact [facility] directly at [phone].  Normal or non-critical lab and imaging results will be communicated to you by MyChart, letter or phone within 4 business days after the clinic has received the results. If you do not hear from us within 7 days, please contact the clinic through MyChart or phone. If you have a critical or abnormal lab result, we will notify you by phone as soon as possible.  Submit refill requests through [employer] or call your pharmacy and they will  "forward the refill request to us. Please allow 3 business days for your refill to be completed.          Additional Information About Your Visit        [facility] Information     [employer] lets you send messages to your doctor, view your test results, renew your prescriptions, schedule appointments and more. To sign up, go to [email or web address] . Click on \"Log in\" on the left side of the screen, which will take you to the Welcome page. Then click on \"Sign up Now\" on the right side of the page.     You will be asked to enter the access code listed below, as well as some personal information. Please follow the directions to create your username and password.     Your access code is: [email or web address]  Expires: 2017  6:30 AM     Your access code will  in 90 days. If you need help or a new code, please call your [place] clinic or [phone].        Care EveryWhere ID     This is your Care EveryWhere ID. This could be used by other organizations to access your [place] medical records  [id number]        Your Vitals Were     Pulse Height Pulse Oximetry BMI (Body Mass Index)          45 1.524 m (5') 98% 19.26 kg/m2         Blood Pressure from Last 3 Encounters:   17 117/61   17 113/67   17 123/72    Weight from Last 3 Encounters:   17 44.7 kg (98 lb 9.6 oz)   17 47.7 kg (105 lb 1.6 oz)   17 48.1 kg (106 lb 1.6 oz)              We Performed the Following     EKG 12-lead, tracing only (Future)          Today's Medication Changes          These changes are accurate as of: 17 10:55 AM.  If you have any questions, ask your nurse or doctor.               Start taking these medicines.        Dose/Directions    enoxaparin 40 MG/0.4ML injection   Commonly known as:  LOVENOX   Used for:  Paroxysmal atrial fibrillation (H)   Started by:  Sandra John APRN CNP        Dose:  1 mg/kg   Inject 0.45 mLs (45 mg) Subcutaneous 2 times daily for 6 days   Quantity:  5.4 mL   Refills: "  0       warfarin 2.5 MG tablet   Commonly known as:  COUMADIN   Used for:  Paroxysmal atrial fibrillation (H)   Started by:  Sandra John APRN CNP        Dose:  2.5 mg   Take 1 tablet (2.5 mg) by mouth daily   Quantity:  30 tablet   Refills:  1         Stop taking these medicines if you haven't already. Please contact your care team if you have questions.     amiodarone 200 MG tablet   Commonly known as:  PACERONE/CODARONE   Stopped by:  Sandra John APRN CNP                Where to get your medicines      These medications were sent to [place] [id number] [place] [facility] - [place], [place] - [address] [facility]  [address], [place] [address]     Phone:  [phone]     enoxaparin 40 MG/0.4ML injection    warfarin 2.5 MG tablet                Primary Care Provider Office Phone # Fax #    Andrea Nino [phone]       [facility] [address] [address]        Thank you!     Thank you for choosing [facility]  for your care. Our goal is always to provide you with excellent care. Hearing back from our patients is one way we can continue to improve our services. Please take a few minutes to complete the written survey that you may receive in the mail after your visit with us. Thank you!             Your Updated Medication List - Protect others around you: Learn how to safely use, store and throw away your medicines at www.disposemymeds.org.          This list is accurate as of: 2/22/17 10:55 AM.  Always use your most recent med list.                   Brand Name Dispense Instructions for use    acetaminophen 160 MG/5ML solution    TYLENOL    300 mL    20.3 mLs (650 mg) by Per Feeding Tube route every 6 hours       CULTURELLE PO      Take 1 tablet by mouth 2 times daily       docusate 50 MG/5ML liquid    COLACE    300 mL    10 mLs (100 mg) by Per G Tube route 2 times daily as needed for constipation       enoxaparin 40 MG/0.4ML injection    LOVENOX    5.4  mL    Inject 0.45 mLs (45 mg) Subcutaneous 2 times daily for 6 days       fiber modular packet      1 packet by Per Feeding Tube route 2 times daily       gabapentin 250 MG/5ML solution    NEURONTIN    250 mL    8 mLs (400 mg) by Per G Tube route every 8 hours       loperamide 1 MG/5ML liquid    IMODIUM    200 mL    Take 20 mLs (4 mg) by mouth 4 times daily as needed for diarrhea       melatonin 1 MG Tabs tablet      3 tablets (3 mg) by Per G Tube route nightly as needed       metoprolol 50 MG tablet    LOPRESSOR    60 tablet    50 mg BID.  May crush, mix with water and administer via feeding tube       multivitamins with minerals Liqd liquid      Take 10 mLs by mouth daily       oxyCODONE 5 MG/5ML solution    ROXICODONE    500 mL    Take 5-10 mLs (5-10 mg) by mouth every 4 hours as needed for moderate to severe pain       polyethylene glycol Packet    MIRALAX/GLYCOLAX    14 packet    17 g by Per G Tube route daily       simethicone 40 MG/0.6ML suspension    MYLICON     0.6 mLs (40 mg) by Per Feeding Tube route every 6 hours as needed for cramping       traZODone 100 MG tablet    DESYREL    30 tablet    1 tablet (100 mg) by Per G Tube route At Bedtime       warfarin 2.5 MG tablet    COUMADIN    30 tablet    Take 1 tablet (2.5 mg) by mouth daily       Zinc Sulfate 220 (50 ZN) MG Tabs     30 tablet    220 mg daily.  OK to crush, mix with water and administer via feeding tube.

## 2017-02-22 NOTE — PROGRESS NOTES
THORACIC SURGERY FOLLOW UP VISIT     Dear Dr. Carroll,  I saw Mrs. Minerva Blanco in follow-up today. The clinical summary follows:      PREOP DIAGNOSIS   1.  Chronic esophageal perforation with mediastinal phlegmon.    2.  Status post fundoplication.       PROCEDURE   1/9/2017  1.  Esophagogastroscopy.    2.  Laparoscopic lysis of adhesions.    3.  Laparoscopic proximal gastrectomy and gastrostomy tube placement.    4.  Left thoracotomy with excision of mediastinal phlegmon and distal esophagectomy.    5.  Thoracic duct ligation.    6.  Right tube thoracostomy.    7.  Left esophageal spit fistula.    8.  Bronchoscopy.       PRIOR PROCEDURES  1) Multiple endoscopies and pharyngostomy tube placement x 2 (for drainage of paraesophageal cavity)  2) Esophageal stent placement, attempted placement of biliary stent into the paraesophageal cavity (7/22/2016)  3) Esophageal stent removal, placement of retrograde gastroesophageal tube (10/23/2016)  4) Toupet fundoplication (1/2016)        COMPLICATIONS  Thoracotomy wound infection requiring antibiotics    INTERVAL STUDIES  None     TOB never  ETOH negative     SUBJECTIVE  Ms. Blanco was discharged from TCU 3 weeks ago and is doing well at home. Continues to have 2-3 episodes of loose diarrhea overnight. Working with nutritionist to optimize tube feeds, however has had 7 lb weight loss since last visit due to lower calorie formula.     From a personal perspective, she is here with her  Enrique.     Incisions well healed  Spit fistula patent and viable     IMPRESSION   (K52.9) Chronic diarrhea  (primary encounter diagnosis)  (E46) Malnutrition (H)  (Z90.49) H/O esophagectomy  (K21.9) Gastroesophageal reflux disease without esophagitis       71 year-old female S/P esophagectomy and spit fistula for end-stage GERD  Diarrhea secondary to tube feeds  Resolved wound infection     PLAN  I reviewed the plan as follows:  1) Rx for tincture of opium for diarrhea  2) Follow up in 1  month with nutrition labs  They had all their questions answered and were in agreement with the plan.  I appreciate the opportunity to participate in the care of your patient and will keep you updated.  Sincerely,

## 2017-02-22 NOTE — MR AVS SNAPSHOT
After Visit Summary   2/22/2017    Minerva Blanco    MRN: [id number]           Patient Information     Date Of Birth          1945        Visit Information        Provider Department      2/22/2017 12:15 PM Jens Wise MD [facility]        Today's Diagnoses     Chronic diarrhea    -  1    Malnutrition (H)        H/O esophagectomy        Gastroesophageal reflux disease without esophagitis           Follow-ups after your visit        Follow-up notes from your care team     Return in about 1 month (around 3/22/2017).      Future tests that were ordered for you today     Open Standing Orders        Priority Remaining Interval Expires Ordered    INR Routine 99/99  2/22/2018 2/22/2017          Open Future Orders        Priority Expected Expires Ordered    Albumin level Routine  2/22/2018 2/22/2017    Transferrin Routine  2/22/2018 2/22/2017    Zio Patch 24 Hours Routine  4/8/2017 2/22/2017            Who to contact     If you have questions or need follow up information about today's clinic visit or your schedule please contact [facility] directly at [phone].  Normal or non-critical lab and imaging results will be communicated to you by Dominohart, letter or phone within 4 business days after the clinic has received the results. If you do not hear from us within 7 days, please contact the clinic through [employer] or phone. If you have a critical or abnormal lab result, we will notify you by phone as soon as possible.  Submit refill requests through [employer] or call your pharmacy and they will forward the refill request to us. Please allow 3 business days for your refill to be completed.          Additional Information About Your Visit        MyChart Information     [employer] lets you send messages to your doctor, view your test results, renew your prescriptions, schedule appointments and more. To sign up, go to [email or web address] . Click on  "\"Log in\" on the left side of the screen, which will take you to the Welcome page. Then click on \"Sign up Now\" on the right side of the page.     You will be asked to enter the access code listed below, as well as some personal information. Please follow the directions to create your username and password.     Your access code is: [email or web address]  Expires: 2017  6:30 AM     Your access code will  in 90 days. If you need help or a new code, please call your [place] clinic or [phone].        Care EveryWhere ID     This is your Care EveryWhere ID. This could be used by other organizations to access your [place] medical records  [id number]        Your Vitals Were     Pulse Pulse Oximetry BMI (Body Mass Index)             45 98% 19.14 kg/m2          Blood Pressure from Last 3 Encounters:   17 117/61   17 117/61   17 113/67    Weight from Last 3 Encounters:   17 44.5 kg (98 lb)   17 44.7 kg (98 lb 9.6 oz)   17 47.7 kg (105 lb 1.6 oz)              We Performed the Following     Prealbumin          Today's Medication Changes          These changes are accurate as of: 17  4:11 PM.  If you have any questions, ask your nurse or doctor.               Start taking these medicines.        Dose/Directions    enoxaparin 40 MG/0.4ML injection   Commonly known as:  LOVENOX   Used for:  Paroxysmal atrial fibrillation (H)   Started by:  Sandra John APRN CNP        Dose:  1 mg/kg   Inject 0.45 mLs (45 mg) Subcutaneous 2 times daily for 6 days   Quantity:  5.4 mL   Refills:  0       opium tincture tincture   Used for:  Chronic diarrhea   Started by:  Jens Wise MD        Dose:  6 mg   Take 0.6 mLs (6 mg) by mouth every 6 hours as needed   Quantity:  15 mL   Refills:  0       warfarin 2.5 MG tablet   Commonly known as:  COUMADIN   Used for:  Paroxysmal atrial fibrillation (H)   Started by:  Sandra John APRN CNP        Dose:  2.5 mg   Take 1 tablet " (2.5 mg) by mouth daily   Quantity:  30 tablet   Refills:  1         Stop taking these medicines if you haven't already. Please contact your care team if you have questions.     acetaminophen 160 MG/5ML solution   Commonly known as:  TYLENOL   Stopped by:  Jens Wise MD           amiodarone 200 MG tablet   Commonly known as:  PACERONE/CODARONE   Stopped by:  Sandra John APRN CNP           docusate 50 MG/5ML liquid   Commonly known as:  COLACE   Stopped by:  Jens Wise MD           melatonin 1 MG Tabs tablet   Stopped by:  Jens Wise MD           polyethylene glycol Packet   Commonly known as:  MIRALAX/GLYCOLAX   Stopped by:  Jens Wise MD           simethicone 40 MG/0.6ML suspension   Commonly known as:  MYLICON   Stopped by:  Jens Wise MD                Where to get your medicines      These medications were sent to [facility] [id number] [facility] - [place], [place] - [address], [place] [address]     Phone:  [phone]     enoxaparin 40 MG/0.4ML injection    warfarin 2.5 MG tablet         Some of these will need a paper prescription and others can be bought over the counter.  Ask your nurse if you have questions.     Bring a paper prescription for each of these medications     opium tincture tincture                Primary Care Provider Office Phone # Fax #    Andrea Nino [phone]       [facility] [address] [address]        Thank you!     Thank you for choosing [facility]  for your care. Our goal is always to provide you with excellent care. Hearing back from our patients is one way we can continue to improve our services. Please take a few minutes to complete the written survey that you may receive in the mail after your visit with us. Thank you!             Your Updated Medication List - Protect others around you: Learn how to safely use,  store and throw away your medicines at www.disposemymeds.org.          This list is accurate as of: 2/22/17  4:11 PM.  Always use your most recent med list.                   Brand Name Dispense Instructions for use    CULTURELLE PO      Take 1 tablet by mouth 2 times daily       enoxaparin 40 MG/0.4ML injection    LOVENOX    5.4 mL    Inject 0.45 mLs (45 mg) Subcutaneous 2 times daily for 6 days       fiber modular packet      1 packet by Per Feeding Tube route 2 times daily       gabapentin 250 MG/5ML solution    NEURONTIN    250 mL    8 mLs (400 mg) by Per G Tube route every 8 hours       loperamide 1 MG/5ML liquid    IMODIUM    200 mL    Take 20 mLs (4 mg) by mouth 4 times daily as needed for diarrhea       metoprolol 50 MG tablet    LOPRESSOR    60 tablet    50 mg BID.  May crush, mix with water and administer via feeding tube       multivitamins with minerals Liqd liquid      Take 10 mLs by mouth daily       opium tincture tincture     15 mL    Take 0.6 mLs (6 mg) by mouth every 6 hours as needed       oxyCODONE 5 MG/5ML solution    ROXICODONE    500 mL    Take 5-10 mLs (5-10 mg) by mouth every 4 hours as needed for moderate to severe pain       traZODone 100 MG tablet    DESYREL    30 tablet    1 tablet (100 mg) by Per G Tube route At Bedtime       warfarin 2.5 MG tablet    COUMADIN    30 tablet    Take 1 tablet (2.5 mg) by mouth daily       Zinc Sulfate 220 (50 ZN) MG Tabs     30 tablet    220 mg daily.  OK to crush, mix with water and administer via feeding tube.

## 2017-02-23 LAB — INTERPRETATION ECG - MUSE: NORMAL

## 2017-02-27 ENCOUNTER — ANTICOAGULATION THERAPY VISIT (OUTPATIENT)
Dept: ANTICOAGULATION | Facility: CLINIC | Age: 72
End: 2017-02-27

## 2017-02-27 DIAGNOSIS — Z79.01 LONG-TERM (CURRENT) USE OF ANTICOAGULANTS: ICD-10-CM

## 2017-02-27 DIAGNOSIS — I48.91 ATRIAL FIBRILLATION (H): ICD-10-CM

## 2017-02-27 DIAGNOSIS — B37.2 YEAST INFECTION OF THE SKIN: Primary | ICD-10-CM

## 2017-02-27 LAB — INR PPP: 3.6

## 2017-02-27 RX ORDER — NYSTATIN 10B UNIT
POWDER (EA) MISCELLANEOUS
Qty: 1 EACH | Refills: 1 | Status: ON HOLD | OUTPATIENT
Start: 2017-02-27 | End: 2017-05-03

## 2017-02-27 NOTE — PROGRESS NOTES
ANTICOAGULATION FOLLOW-UP CLINIC VISIT    Patient Name:  Minerva Blanco  Date:  2/27/2017  Contact Type:  Telephone    SUBJECTIVE:     Patient Findings     Comments Minerva is on tube feeds and often has trouble with diarrhea.  She was started on opium tincture tincture for diarrhea.  Instructions were given to stop Lovenox.           OBJECTIVE    INR   Date Value Ref Range Status   02/27/2017 3.6  Final       ASSESSMENT / PLAN  INR assessment SUPRA    Recheck INR In: 3 DAYS    INR Location [facility]      Anticoagulation Summary as of 2/27/2017     INR goal 2.0-3.0   Today's INR 3.6!   Maintenance plan No maintenance plan   Full instructions 2/27: 1.25 mg; 2/28: 1.25 mg; 3/1: 1.25 mg; Otherwise No maintenance plan   Next INR check 3/2/2017   Target end date     Indications   Long-term (current) use of anticoagulants [Z79.01] [Z79.01]  Atrial fibrillation (H) [I48.91] [I48.91]         Anticoagulation Episode Summary     INR check location     Preferred lab     Send INR reminders to [facility]    Comments       Anticoagulation Care Providers     Provider Role Specialty Phone number    Pedro Moreno MD Responsible Clinical Cardiac Electrophysiology [phone]            See the Encounter Report to view Anticoagulation Flowsheet and Dosing Calendar (Go to Encounters tab in chart review, and find the Anticoagulation Therapy Visit)    Spoke with Nadia CASTRO.    Leigh Rene RN

## 2017-02-27 NOTE — MR AVS SNAPSHOT
Minerva JIMENEZ Francis   2/27/2017   Anticoagulation Therapy Visit    Description:  72 year old female   Provider:  Leigh Rene, RN   Department:  [facility]           INR as of 2/27/2017     Today's INR 3.6!      Anticoagulation Summary as of 2/27/2017     INR goal 2.0-3.0   Today's INR 3.6!   Full instructions 2/27: 1.25 mg; 2/28: 1.25 mg; 3/1: 1.25 mg; Otherwise No maintenance plan   Next INR check 3/2/2017    Indications   Long-term (current) use of anticoagulants [Z79.01] [Z79.01]  Atrial fibrillation (H) [I48.91] [I48.91]         February 2017 Details    Sun Mon Tue Wed Thu Fri Sat        1               2               3               4                 5               6               7               8               9               10               11                 12               13               14               15               16               17               18                 19               20               21               22               23               24               25                 26               27      1.25 mg   See details      28      1.25 mg              Date Details   02/27 This INR check               How to take your warfarin dose     To take:  1.25 mg Take 0.5 of a 2.5 mg tablet.           March 2017 Details    Sun Mon Tue Wed Thu Fri Sat        1      1.25 mg         2            3               4                 5               6               7               8               9               10               11                 12               13               14               15               16               17               18                 19               20               21               22               23               24               25                 26               27               28               29               30               31                 Date Details   No additional details    Date of next INR:  3/2/2017         How to take your  warfarin dose     To take:  1.25 mg Take 0.5 of a 2.5 mg tablet.

## 2017-02-27 NOTE — PROGRESS NOTES
Call from Nadia Whitehead, [facility]. Minerva has developed a fungated yeast infection to the skin under her pouch. She is requesting Nystatin powder so Minerva can apply this to the affected area with each pouch change.    Script ordered electronically.

## 2017-03-02 ENCOUNTER — ANTICOAGULATION THERAPY VISIT (OUTPATIENT)
Dept: ANTICOAGULATION | Facility: CLINIC | Age: 72
End: 2017-03-02

## 2017-03-02 DIAGNOSIS — I48.0 PAROXYSMAL ATRIAL FIBRILLATION (H): Primary | ICD-10-CM

## 2017-03-02 DIAGNOSIS — Z79.01 LONG-TERM (CURRENT) USE OF ANTICOAGULANTS: ICD-10-CM

## 2017-03-02 DIAGNOSIS — I48.91 ATRIAL FIBRILLATION (H): ICD-10-CM

## 2017-03-02 LAB — INR PPP: 3.6

## 2017-03-02 RX ORDER — WARFARIN SODIUM 1 MG/1
TABLET ORAL
Qty: 60 TABLET | Refills: 1 | Status: SHIPPED | OUTPATIENT
Start: 2017-03-02 | End: 2017-04-12

## 2017-03-02 NOTE — PROGRESS NOTES
ANTICOAGULATION FOLLOW-UP CLINIC VISIT    Patient Name:  Minerva Blanco  Date:  3/2/2017  Contact Type:  Telephone    SUBJECTIVE:     Patient Findings     Positives Initiation of therapy           OBJECTIVE    INR   Date Value Ref Range Status   03/02/2017 3.6  Final       ASSESSMENT / PLAN  INR assessment SUPRA    Recheck INR In: 4 DAYS    INR Location Clinic      Anticoagulation Summary as of 3/2/2017     INR goal 2.0-3.0   Today's INR 3.6!   Maintenance plan No maintenance plan   Full instructions 3/2: Hold; 3/3: 1.25 mg; 3/4: 1.25 mg; 3/5: 1.25 mg; Otherwise No maintenance plan   Next INR check 3/6/2017   Target end date     Indications   Long-term (current) use of anticoagulants [Z79.01] [Z79.01]  Atrial fibrillation (H) [I48.91] [I48.91]         Anticoagulation Episode Summary     INR check location     Preferred lab     Send INR reminders to [facility]    Comments       Anticoagulation Care Providers     Provider Role Specialty Phone number    Pedro Moreno MD Responsible Clinical Cardiac Electrophysiology [phone]            See the Encounter Report to view Anticoagulation Flowsheet and Dosing Calendar (Go to Encounters tab in chart review, and find the Anticoagulation Therapy Visit)    Spoke with Chelo.  New warfarin 1mg strength tabs called to pharmacy.    Leigh Rene RN

## 2017-03-02 NOTE — NURSING NOTE
On March 2, 2017 an order was faxed to [facility] requesting a Zio patch to be mailed to the patient. The patient is to wear the zio for 1 day around April 12, 2017.    Angela Reyes  Periop Electrophysiology   [phone]

## 2017-03-02 NOTE — MR AVS SNAPSHOT
Minerva JIMENEZ Francis   3/2/2017   Anticoagulation Therapy Visit    Description:  72 year old female   Provider:  Leigh Rene, RN   Department:  [facility]           INR as of 3/2/2017     Today's INR 3.6!      Anticoagulation Summary as of 3/2/2017     INR goal 2.0-3.0   Today's INR 3.6!   Full instructions 3/2: Hold; 3/3: 1.25 mg; 3/4: 1.25 mg; 3/5: 1.25 mg; Otherwise No maintenance plan   Next INR check 3/6/2017    Indications   Long-term (current) use of anticoagulants [Z79.01] [Z79.01]  Atrial fibrillation (H) [I48.91] [I48.91]         March 2017 Details    Sun Mon Tue Wed Thu Fri Sat        1               2      Hold   See details      3      1.25 mg         4      1.25 mg           5      1.25 mg         6            7               8               9               10               11                 12               13               14               15               16               17               18                 19               20               21               22               23               24               25                 26               27               28               29               30               31                 Date Details   03/02 This INR check       Date of next INR:  3/6/2017         How to take your warfarin dose     To take:  1.25 mg Take 0.5 of a 2.5 mg tablet.    Hold Do not take your warfarin dose. See the Details table to the right for additional instructions.

## 2017-03-06 ENCOUNTER — ANTICOAGULATION THERAPY VISIT (OUTPATIENT)
Dept: ANTICOAGULATION | Facility: CLINIC | Age: 72
End: 2017-03-06

## 2017-03-06 DIAGNOSIS — I48.91 ATRIAL FIBRILLATION (H): ICD-10-CM

## 2017-03-06 DIAGNOSIS — Z79.01 LONG-TERM (CURRENT) USE OF ANTICOAGULANTS: ICD-10-CM

## 2017-03-06 LAB — INR POINT OF CARE: 1.6 (ref 0.86–1.14)

## 2017-03-06 NOTE — MR AVS SNAPSHOT
Minerva JIMENEZ Francis   3/6/2017   Anticoagulation Therapy Visit    Description:  72 year old female   Provider:  Mary Garsia, RN   Department:  [facility]           INR as of 3/6/2017     Today's INR 1.6!      Anticoagulation Summary as of 3/6/2017     INR goal 2.0-3.0   Today's INR 1.6!   Full instructions 3/6: 2.5 mg; 3/7: 1.25 mg; 3/8: 2.5 mg; Otherwise No maintenance plan   Next INR check 3/9/2017    Indications   Long-term (current) use of anticoagulants [Z79.01] [Z79.01]  Atrial fibrillation (H) [I48.91] [I48.91]         March 2017 Details    Sun Mon Tue Wed Thu Fri Sat        1               2               3               4                 5               6      2.5 mg   See details      7      1.25 mg         8      2.5 mg         9            10               11                 12               13               14               15               16               17               18                 19               20               21               22               23               24               25                 26               27               28               29               30               31                 Date Details   03/06 This INR check       Date of next INR:  3/9/2017         How to take your warfarin dose     To take:  1.25 mg Take 0.5 of a 2.5 mg tablet.    To take:  2.5 mg Take 1 of the 2.5 mg tablets.

## 2017-03-06 NOTE — PROGRESS NOTES
ANTICOAGULATION FOLLOW-UP CLINIC VISIT    Patient Name:  Minerva Blanco  Date:  3/6/2017  Contact Type:  Telephone    SUBJECTIVE:     Patient Findings     Positives Med error    Comments Pt misunderstood dosing.  She got the new 1 mg tabs, and took 1/2 tab of these for 3/3 - 3/5           OBJECTIVE    INR Protime   Date Value Ref Range Status   03/06/2017 1.6 (A) 0.86 - 1.14 Final       ASSESSMENT / PLAN  No question data found.  Anticoagulation Summary as of 3/6/2017     INR goal 2.0-3.0   Today's INR 1.6!   Maintenance plan No maintenance plan   Full instructions 3/6: 2.5 mg; 3/7: 1.25 mg; 3/8: 2.5 mg; Otherwise No maintenance plan   Next INR check 3/9/2017   Target end date     Indications   Long-term (current) use of anticoagulants [Z79.01] [Z79.01]  Atrial fibrillation (H) [I48.91] [I48.91]         Anticoagulation Episode Summary     INR check location     Preferred lab     Send INR reminders to [facility]    Comments       Anticoagulation Care Providers     Provider Role Specialty Phone number    Pedro Moreno MD Responsible Clinical Cardiac Electrophysiology [phone]            See the Encounter Report to view Anticoagulation Flowsheet and Dosing Calendar (Go to Encounters tab in chart review, and find the Anticoagulation Therapy Visit)    Spoke with home care nurse Chelo who will review the specifics of different tablet sizes today    Mary Garsia RN             Late in the day writer calls pt's home phone & an urgent voicemail is left requesting she take 1 mg coumadin on 3/6, 3/7 & 3/8.  Two attempts to reach Chelo (home care RN) have been unsuccessful to relay this message to her.  The coumadin recommendation is revised after thinking about the initial dose recommendation & deciding to give less than originally planned.

## 2017-03-07 DIAGNOSIS — K52.9 CHRONIC DIARRHEA: ICD-10-CM

## 2017-03-09 ENCOUNTER — ANTICOAGULATION THERAPY VISIT (OUTPATIENT)
Dept: ANTICOAGULATION | Facility: CLINIC | Age: 72
End: 2017-03-09

## 2017-03-09 DIAGNOSIS — I48.91 ATRIAL FIBRILLATION (H): ICD-10-CM

## 2017-03-09 DIAGNOSIS — Z79.01 LONG-TERM (CURRENT) USE OF ANTICOAGULANTS: ICD-10-CM

## 2017-03-09 LAB — INR POINT OF CARE: 1.3 (ref 0.86–1.14)

## 2017-03-09 NOTE — MR AVS SNAPSHOT
Minerva JIMENEZ Francis   3/9/2017   Anticoagulation Therapy Visit    Description:  72 year old female   Provider:  Mary Garsia, RN   Department:  [facility]           INR as of 3/9/2017     Today's INR 1.3!      Anticoagulation Summary as of 3/9/2017     INR goal 2.0-3.0   Today's INR 1.3!   Full instructions 3/9: 2.5 mg; 3/10: 1.25 mg; 3/11: 2.5 mg; 3/12: 1.25 mg   Next INR check 3/13/2017    Indications   Long-term (current) use of anticoagulants [Z79.01] [Z79.01]  Atrial fibrillation (H) [I48.91] [I48.91]         March 2017 Details    Sun Mon Tue Wed Thu Fri Sat        1               2               3               4                 5               6               7               8               9      2.5 mg   See details      10      1.25 mg         11      2.5 mg           12      1.25 mg         13            14               15               16               17               18                 19               20               21               22               23               24               25                 26               27               28               29               30               31                 Date Details   03/09 This INR check       Date of next INR:  3/13/2017         How to take your warfarin dose     To take:  1.25 mg Take 0.5 of a 2.5 mg tablet.    To take:  2.5 mg Take 1 of the 2.5 mg tablets.

## 2017-03-09 NOTE — PROGRESS NOTES
ANTICOAGULATION FOLLOW-UP CLINIC VISIT    Patient Name:  Minerva Blanco  Date:  3/9/2017  Contact Type:  Telephone    SUBJECTIVE:        OBJECTIVE    INR Protime   Date Value Ref Range Status   03/09/2017 1.3 (A) 0.86 - 1.14 Final       ASSESSMENT / PLAN  INR assessment SUB    Recheck INR In: 4 DAYS    INR Location [facility]      Anticoagulation Summary as of 3/9/2017     INR goal 2.0-3.0   Today's INR 1.3!   Maintenance plan No maintenance plan   Full instructions 3/9: 2.5 mg; 3/10: 1.25 mg; 3/11: 2.5 mg; 3/12: 1.25 mg   Plan last modified Mary Garsia RN (3/6/2017)   Next INR check 3/13/2017   Target end date     Indications   Long-term (current) use of anticoagulants [Z79.01] [Z79.01]  Atrial fibrillation (H) [I48.91] [I48.91]         Anticoagulation Episode Summary     INR check location     Preferred lab     [facility]    Comments as of 3/9: pt gets tube feedings @ [facility] - this results in diarrhea - ~ 4-7 episodes in 24 hr.      Anticoagulation Care Providers     Provider Role Specialty Phone number    Pedro Moreno MD Responsible Clinical Cardiac Electrophysiology [phone]            See the Encounter Report to view Anticoagulation Flowsheet and Dosing Calendar (Go to Encounters tab in chart review, and find the Anticoagulation Therapy Visit)    Spoke with home care nurse Blessing.  In last 24 hr pt has had 6 episodes of diarrhea.    Mary Garsia RN

## 2017-03-13 ENCOUNTER — ANTICOAGULATION THERAPY VISIT (OUTPATIENT)
Dept: ANTICOAGULATION | Facility: CLINIC | Age: 72
End: 2017-03-13

## 2017-03-13 DIAGNOSIS — I48.91 ATRIAL FIBRILLATION (H): ICD-10-CM

## 2017-03-13 DIAGNOSIS — Z79.01 LONG-TERM (CURRENT) USE OF ANTICOAGULANTS: ICD-10-CM

## 2017-03-13 LAB — INR POINT OF CARE: 1.5 (ref 0.86–1.14)

## 2017-03-13 NOTE — PROGRESS NOTES
ANTICOAGULATION FOLLOW-UP CLINIC VISIT    Patient Name:  Minerva Blanco  Date:  3/13/2017  Contact Type:  Telephone    SUBJECTIVE:     Patient Findings     Positives No Problem Findings           OBJECTIVE    INR Protime   Date Value Ref Range Status   03/13/2017 1.5 (A) 0.86 - 1.14 Final       ASSESSMENT / PLAN  INR assessment SUB    Recheck INR In: 3 DAYS    INR Location [facility]      Anticoagulation Summary as of 3/13/2017     INR goal 2.0-3.0   Today's INR 1.5!   Maintenance plan No maintenance plan   Full instructions 3/13: 2.5 mg; 3/14: 2.5 mg; 3/15: 2.5 mg   Plan last modified Mary Garsia, RN (3/6/2017)   Next INR check 3/16/2017   Target end date     Indications   Long-term (current) use of anticoagulants [Z79.01] [Z79.01]  Atrial fibrillation (H) [I48.91] [I48.91]         Anticoagulation Episode Summary     INR check location     Preferred lab     Send INR reminders to [facility]    Comments as of 3/9: pt gets tube feedings @ HS - this results in diarrhea - ~ 4-7 episodes in 24 hr.      Anticoagulation Care Providers     Provider Role Specialty Phone number    Pedro Moreno MD Responsible Clinical Cardiac Electrophysiology [phone]            See the Encounter Report to view Anticoagulation Flowsheet and Dosing Calendar (Go to Encounters tab in chart review, and find the Anticoagulation Therapy Visit)    Spoke with home care nurse Chelo.    Mary Garsia, RN

## 2017-03-13 NOTE — MR AVS SNAPSHOT
Minerva JIMENEZ Francis   3/13/2017   Anticoagulation Therapy Visit    Description:  72 year old female   Provider:  Mary Garsia, RN   Department:  [facility]           INR as of 3/13/2017     Today's INR 1.5!      Anticoagulation Summary as of 3/13/2017     INR goal 2.0-3.0   Today's INR 1.5!   Full instructions 3/13: 2.5 mg; 3/14: 2.5 mg; 3/15: 2.5 mg   Next INR check 3/16/2017    Indications   Long-term (current) use of anticoagulants [Z79.01] [Z79.01]  Atrial fibrillation (H) [I48.91] [I48.91]         March 2017 Details    Sun Mon Tue Wed Thu Fri Sat        1               2               3               4                 5               6               7               8               9               10               11                 12               13      2.5 mg   See details      14      2.5 mg         15      2.5 mg         16            17               18                 19               20               21               22               23               24               25                 26               27               28               29               30               31                 Date Details   03/13 This INR check       Date of next INR:  3/16/2017         How to take your warfarin dose     To take:  2.5 mg Take 1 of the 2.5 mg tablets.

## 2017-03-14 DIAGNOSIS — G89.29 OTHER CHRONIC PAIN: Primary | ICD-10-CM

## 2017-03-14 RX ORDER — OXYCODONE HYDROCHLORIDE 5 MG/1
20 TABLET ORAL EVERY 4 HOURS PRN
Qty: 192 TABLET | Refills: 0 | Status: SHIPPED | OUTPATIENT
Start: 2017-03-14 | End: 2017-03-22

## 2017-03-16 ENCOUNTER — ANTICOAGULATION THERAPY VISIT (OUTPATIENT)
Dept: ANTICOAGULATION | Facility: CLINIC | Age: 72
End: 2017-03-16

## 2017-03-16 DIAGNOSIS — I48.91 ATRIAL FIBRILLATION (H): ICD-10-CM

## 2017-03-16 DIAGNOSIS — R00.1 BRADYCARDIA: Primary | ICD-10-CM

## 2017-03-16 DIAGNOSIS — Z79.01 LONG-TERM (CURRENT) USE OF ANTICOAGULANTS: ICD-10-CM

## 2017-03-16 LAB — INR PPP: 2

## 2017-03-16 NOTE — PROGRESS NOTES
ANTICOAGULATION FOLLOW-UP CLINIC VISIT    Patient Name:  Minerva Blanco  Date:  3/16/2017  Contact Type:  Telephone    SUBJECTIVE:     Patient Findings     Positives No Problem Findings           OBJECTIVE    INR   Date Value Ref Range Status   03/16/2017 2.0  Final       ASSESSMENT / PLAN  INR assessment THER    Recheck INR In: 4 DAYS    INR Location [facility]      Anticoagulation Summary as of 3/16/2017     INR goal 2.0-3.0   Today's INR 2.0   Maintenance plan No maintenance plan   Full instructions 3/16: 1.25 mg; 3/17: 2.5 mg; 3/18: 2.5 mg; 3/19: 2.5 mg   Plan last modified Mary Garsia, RN (3/6/2017)   Next INR check 3/20/2017   Target end date     Indications   Long-term (current) use of anticoagulants [Z79.01] [Z79.01]  Atrial fibrillation (H) [I48.91] [I48.91]         Anticoagulation Episode Summary     INR check location     Preferred lab     Send INR reminders to [facility]    Comments as of 3/9: pt gets tube feedings @ HS - this results in diarrhea - ~ 4-7 episodes in 24 hr.      Anticoagulation Care Providers     Provider Role Specialty Phone number    Pedro Moreno MD Responsible Clinical Cardiac Electrophysiology [phone]            See the Encounter Report to view Anticoagulation Flowsheet and Dosing Calendar (Go to Encounters tab in chart review, and find the Anticoagulation Therapy Visit)    Spoke with Chelo CASTRO.    Leigh Rene, ROMAINE

## 2017-03-16 NOTE — MR AVS SNAPSHOT
Minerva JIMENEZ Francis   3/16/2017   Anticoagulation Therapy Visit    Description:  72 year old female   Provider:  Leigh Rene, RN   Department:  [facility]           INR as of 3/16/2017     Today's INR 2.0      Anticoagulation Summary as of 3/16/2017     INR goal 2.0-3.0   Today's INR 2.0   Full instructions 3/16: 1.25 mg; 3/17: 2.5 mg; 3/18: 2.5 mg; 3/19: 2.5 mg   Next INR check 3/20/2017    Indications   Long-term (current) use of anticoagulants [Z79.01] [Z79.01]  Atrial fibrillation (H) [I48.91] [I48.91]         March 2017 Details    Sun Mon Tue Wed Thu Fri Sat        1               2               3               4                 5               6               7               8               9               10               11                 12               13               14               15               16      1.25 mg   See details      17      2.5 mg         18      2.5 mg           19      2.5 mg         20            21               22               23               24               25                 26               27               28               29               30               31                 Date Details   03/16 This INR check       Date of next INR:  3/20/2017         How to take your warfarin dose     To take:  1.25 mg Take 0.5 of a 2.5 mg tablet.    To take:  2.5 mg Take 1 of the 2.5 mg tablets.

## 2017-03-17 ENCOUNTER — MEDICAL CORRESPONDENCE (OUTPATIENT)
Dept: HEALTH INFORMATION MANAGEMENT | Facility: CLINIC | Age: 72
End: 2017-03-17

## 2017-03-17 NOTE — PROGRESS NOTES
THORACIC SURGERY FOLLOW UP VISIT      Dear Dr. Carroll,  I saw Mrs. Minerva Blanco in follow-up today. The clinical summary follows:      PREOP DIAGNOSIS   1.  Chronic esophageal perforation with mediastinal phlegmon.    2.  Status post fundoplication.        PROCEDURE   1/9/2017  1.  Esophagogastroscopy.    2.  Laparoscopic lysis of adhesions.    3.  Laparoscopic proximal gastrectomy and gastrostomy tube placement.    4.  Left thoracotomy with excision of mediastinal phlegmon and distal esophagectomy.    5.  Thoracic duct ligation.    6.  Right tube thoracostomy.    7.  Left esophageal spit fistula.    8.  Bronchoscopy.        PRIOR PROCEDURES  1) Multiple endoscopies and pharyngostomy tube placement x 2 (for drainage of paraesophageal cavity)  2) Esophageal stent placement, attempted placement of biliary stent into the paraesophageal cavity (7/22/2016)  3) Esophageal stent removal, placement of retrograde gastroesophageal tube (10/23/2016)  4) Toupet fundoplication (1/2016)          COMPLICATIONS  Thoracotomy wound infection requiring antibiotics     INTERVAL STUDIES  None      TOB never  ETOH negative      SUBJECTIVE  Mrs. Blanco's diarrhea has finally resolved and she feels better. Her main issue is still some fatigue and chronic pain.    Incisions well healed  Spit fistula patent and viable    From a personal perspective, she is here with her  Enrique.      IMPRESSION   (K22.3) Esophageal perforation  (primary encounter diagnosis)  (G89.29) Other chronic pain  (Z79.01) On warfarin therapy  (Z90.49) H/O esophagectomy       71 year-old female 2.5 months S/P esophagectomy and spit fistula for end-stage GERD  Chronic pain        PLAN  I spent a total of 25 minutes with Ms. Minerva Blanco and Enrique, more than 50% of which were spent in counseling, coordination of care, and face-to-face time. I reviewed the plan as follows:  1) Look for OR schedule for reconstruction  2) Chem 10 and albumin, prealbumin  today  3) Refill narcotic pain medications for another month and reschedule for pain clinic to help with long-term management  4) Return to clinic and PAC about 1 week prior to surgery  5) Discontinue warfarin 1 week prior alejandra surgery  They had all their questions answered and were in agreement with the plan.  I appreciate the opportunity to participate in the care of your patient and will keep you updated.  Sincerely,

## 2017-03-20 ENCOUNTER — ANTICOAGULATION THERAPY VISIT (OUTPATIENT)
Dept: ANTICOAGULATION | Facility: CLINIC | Age: 72
End: 2017-03-20

## 2017-03-20 DIAGNOSIS — I48.91 ATRIAL FIBRILLATION, UNSPECIFIED TYPE (H): ICD-10-CM

## 2017-03-20 DIAGNOSIS — Z79.01 LONG-TERM (CURRENT) USE OF ANTICOAGULANTS: ICD-10-CM

## 2017-03-20 LAB — INR PPP: 2.3

## 2017-03-20 NOTE — PROGRESS NOTES
ANTICOAGULATION FOLLOW-UP CLINIC VISIT    Patient Name:  Minerva Blanco  Date:  3/20/2017  Contact Type:  Telephone    SUBJECTIVE:        OBJECTIVE    INR   Date Value Ref Range Status   03/20/2017 2.3  Final       ASSESSMENT / PLAN  INR assessment THER    Recheck INR In: 1 WEEK    INR Location [facility]      Anticoagulation Summary as of 3/20/2017     INR goal 2.0-3.0   Today's INR 2.3   Maintenance plan No maintenance plan   Full instructions 3/20: 2.5 mg; 3/21: 2.5 mg; 3/22: 1.25 mg; 3/23: 2.5 mg; 3/24: 2.5 mg; 3/25: 2.5 mg; 3/26: 2.5 mg   Plan last modified Mary Garsia RN (3/6/2017)   Next INR check 3/27/2017   Target end date     Indications   Long-term (current) use of anticoagulants [Z79.01] [Z79.01]  Atrial fibrillation (H) [I48.91] [I48.91]         Anticoagulation Episode Summary     INR check location     Preferred lab     Send INR reminders to [facility]    Comments as of 3/9: pt gets tube feedings @ HS - this results in diarrhea - ~ 4-7 episodes in 24 hr.      Anticoagulation Care Providers     Provider Role Specialty Phone number    Pedro Moreno MD Responsible Clinical Cardiac Electrophysiology [phone]            See the Encounter Report to view Anticoagulation Flowsheet and Dosing Calendar (Go to Encounters tab in chart review, and find the Anticoagulation Therapy Visit)    LM for patient and [facility] nurse.      Gloria Richardson RN

## 2017-03-20 NOTE — MR AVS SNAPSHOT
Minerva JIMENEZ Francis   3/20/2017   Anticoagulation Therapy Visit    Description:  72 year old female   Provider:  Gloria Richardson RN   Department:  [facility]           INR as of 3/20/2017     Today's INR 2.3      Anticoagulation Summary as of 3/20/2017     INR goal 2.0-3.0   Today's INR 2.3   Full instructions 3/20: 2.5 mg; 3/21: 2.5 mg; 3/22: 1.25 mg; 3/23: 2.5 mg; 3/24: 2.5 mg; 3/25: 2.5 mg; 3/26: 2.5 mg   Next INR check 3/27/2017    Indications   Long-term (current) use of anticoagulants [Z79.01] [Z79.01]  Atrial fibrillation (H) [I48.91] [I48.91]         March 2017 Details    Sun Mon Tue Wed Thu Fri Sat        1               2               3               4                 5               6               7               8               9               10               11                 12               13               14               15               16               17               18                 19               20      2.5 mg   See details      21      2.5 mg         22      1.25 mg         23      2.5 mg         24      2.5 mg         25      2.5 mg           26      2.5 mg         27            28               29               30               31                 Date Details   03/20 This INR check       Date of next INR:  3/27/2017         How to take your warfarin dose     To take:  1.25 mg Take 0.5 of a 2.5 mg tablet.    To take:  2.5 mg Take 1 of the 2.5 mg tablets.

## 2017-03-22 ENCOUNTER — ANTICOAGULATION THERAPY VISIT (OUTPATIENT)
Dept: ANTICOAGULATION | Facility: CLINIC | Age: 72
End: 2017-03-22

## 2017-03-22 ENCOUNTER — OFFICE VISIT (OUTPATIENT)
Dept: SURGERY | Facility: CLINIC | Age: 72
End: 2017-03-22
Attending: THORACIC SURGERY (CARDIOTHORACIC VASCULAR SURGERY)
Payer: MEDICARE

## 2017-03-22 DIAGNOSIS — I48.91 ATRIAL FIBRILLATION, UNSPECIFIED TYPE (H): ICD-10-CM

## 2017-03-22 DIAGNOSIS — G89.29 OTHER CHRONIC PAIN: ICD-10-CM

## 2017-03-22 DIAGNOSIS — Z79.01 LONG-TERM (CURRENT) USE OF ANTICOAGULANTS: ICD-10-CM

## 2017-03-22 DIAGNOSIS — K22.3 ESOPHAGEAL PERFORATION: Primary | ICD-10-CM

## 2017-03-22 DIAGNOSIS — E46 MALNUTRITION (H): ICD-10-CM

## 2017-03-22 DIAGNOSIS — K22.3 ESOPHAGEAL PERFORATION: ICD-10-CM

## 2017-03-22 DIAGNOSIS — Z79.01 ON WARFARIN THERAPY: ICD-10-CM

## 2017-03-22 DIAGNOSIS — I48.0 PAROXYSMAL ATRIAL FIBRILLATION (H): ICD-10-CM

## 2017-03-22 DIAGNOSIS — Z90.49 H/O ESOPHAGECTOMY: ICD-10-CM

## 2017-03-22 DIAGNOSIS — Z98.890 H/O ESOPHAGECTOMY: ICD-10-CM

## 2017-03-22 LAB
ALBUMIN SERPL-MCNC: 3.7 G/DL (ref 3.4–5)
ALP SERPL-CCNC: 164 U/L (ref 40–150)
ALT SERPL W P-5'-P-CCNC: 28 U/L (ref 0–50)
ANION GAP SERPL CALCULATED.3IONS-SCNC: 8 MMOL/L (ref 3–14)
AST SERPL W P-5'-P-CCNC: 26 U/L (ref 0–45)
BILIRUB SERPL-MCNC: 0.2 MG/DL (ref 0.2–1.3)
BUN SERPL-MCNC: 26 MG/DL (ref 7–30)
CALCIUM SERPL-MCNC: 8.8 MG/DL (ref 8.5–10.1)
CHLORIDE SERPL-SCNC: 104 MMOL/L (ref 94–109)
CO2 SERPL-SCNC: 29 MMOL/L (ref 20–32)
CREAT SERPL-MCNC: 0.52 MG/DL (ref 0.52–1.04)
GFR SERPL CREATININE-BSD FRML MDRD: ABNORMAL ML/MIN/1.7M2
GLUCOSE SERPL-MCNC: 83 MG/DL (ref 70–99)
INR PPP: 2.14 (ref 0.86–1.14)
POTASSIUM SERPL-SCNC: 4 MMOL/L (ref 3.4–5.3)
PREALB SERPL IA-MCNC: 17 MG/DL (ref 15–45)
PROT SERPL-MCNC: 8.6 G/DL (ref 6.8–8.8)
SODIUM SERPL-SCNC: 140 MMOL/L (ref 133–144)
TRANSFERRIN SERPL-MCNC: 257 MG/DL (ref 210–360)

## 2017-03-22 PROCEDURE — 84466 ASSAY OF TRANSFERRIN: CPT

## 2017-03-22 PROCEDURE — 80053 COMPREHEN METABOLIC PANEL: CPT

## 2017-03-22 PROCEDURE — 99212 OFFICE O/P EST SF 10 MIN: CPT

## 2017-03-22 RX ORDER — OXYCODONE HYDROCHLORIDE 5 MG/1
10 TABLET ORAL EVERY 6 HOURS PRN
Qty: 150 TABLET | Refills: 0 | Status: SHIPPED | OUTPATIENT
Start: 2017-03-22 | End: 2017-04-10

## 2017-03-22 NOTE — MR AVS SNAPSHOT
After Visit Summary   3/22/2017    Minerva Blanco    MRN: [id number]           Patient Information     Date Of Birth          1945        Visit Information        Provider Department      3/22/2017 7:00 AM Jens Wise MD [facility]        Today's Diagnoses     Esophageal perforation    -  1    Other chronic pain        On warfarin therapy        H/O esophagectomy           Follow-ups after your visit        Additional Services     PAC Visit Referral (For [facility] Only)       Does this visit require an Anesthesia consult?  ERAS-preop    H&P done by:  Other (Specify): PAC      Please be aware that coverage of these services is subject to the terms and limitations of your health insurance plan.  Call member services at your health plan with any benefit or coverage questions.      Please bring the following to your appointment:  >>   Any x-rays, CTs or MRIs which have been performed.  Contact the facility where they were done to arrange for  prior to your scheduled appointment.  Any new CT, MRI or other procedures ordered by your specialist must be performed at a [facility] facility or coordinated by your clinic's referral office.    >>   List of current medications  >>   This referral request   >>   Any documents/labs given to you for this referral                  Who to contact     If you have questions or need follow up information about today's clinic visit or your schedule please contact [facility] directly at [phone].  Normal or non-critical lab and imaging results will be communicated to you by MyChart, letter or phone within 4 business days after the clinic has received the results. If you do not hear from us within 7 days, please contact the clinic through MyChart or phone. If you have a critical or abnormal lab result, we will notify you by phone as soon as possible.  Submit refill requests through [employer] or call your pharmacy  "and they will forward the refill request to us. Please allow 3 business days for your refill to be completed.          Additional Information About Your Visit        MyChart Information     [employer] lets you send messages to your doctor, view your test results, renew your prescriptions, schedule appointments and more. To sign up, go to [email or web address] . Click on \"Log in\" on the left side of the screen, which will take you to the Welcome page. Then click on \"Sign up Now\" on the right side of the page.     You will be asked to enter the access code listed below, as well as some personal information. Please follow the directions to create your username and password.     Your access code is: [email or web address]  Expires: 2017  7:30 AM     Your access code will  in 90 days. If you need help or a new code, please call your [place] clinic or [phone].        Care EveryWhere ID     This is your Care EveryWhere ID. This could be used by other organizations to access your [place] medical records  [id number]         Blood Pressure from Last 3 Encounters:   17 117/61   17 117/61   17 113/67    Weight from Last 3 Encounters:   17 44.5 kg (98 lb)   17 44.7 kg (98 lb 9.6 oz)   17 47.7 kg (105 lb 1.6 oz)              We Performed the Following     PAC Visit Referral (For [facility] Only)     Shirley-Operative Worksheet (Thoracic)          Today's Medication Changes          These changes are accurate as of: 3/22/17  7:49 AM.  If you have any questions, ask your nurse or doctor.               These medicines have changed or have updated prescriptions.        Dose/Directions    * oxyCODONE 5 MG/5ML solution   Commonly known as:  ROXICODONE   This may have changed:  Another medication with the same name was changed. Make sure you understand how and when to take each.   Used for:  Acute post-operative pain   Changed by:  Jens Wise MD        Dose:  5-10 mg   Take 5-10 mLs " (5-10 mg) by mouth every 4 hours as needed for moderate to severe pain   Quantity:  500 mL   Refills:  0       * oxyCODONE 5 MG IR tablet   Commonly known as:  ROXICODONE   This may have changed:    - how much to take  - when to take this   Used for:  Other chronic pain   Changed by:  Jens Wise MD        Dose:  10 mg   Take 2 tablets (10 mg) by mouth every 6 hours as needed for moderate to severe pain   Quantity:  150 tablet   Refills:  0       * Notice:  This list has 2 medication(s) that are the same as other medications prescribed for you. Read the directions carefully, and ask your doctor or other care provider to review them with you.      Stop taking these medicines if you haven't already. Please contact your care team if you have questions.     gabapentin 250 MG/5ML solution   Commonly known as:  NEURONTIN   Stopped by:  Jens Wise MD                Where to get your medicines      Some of these will need a paper prescription and others can be bought over the counter.  Ask your nurse if you have questions.     Bring a paper prescription for each of these medications     oxyCODONE 5 MG IR tablet                Primary Care Provider    None Specified       No primary provider on file.        Thank you!     Thank you for choosing [facility]  for your care. Our goal is always to provide you with excellent care. Hearing back from our patients is one way we can continue to improve our services. Please take a few minutes to complete the written survey that you may receive in the mail after your visit with us. Thank you!             Your Updated Medication List - Protect others around you: Learn how to safely use, store and throw away your medicines at www.disposemymeds.org.          This list is accurate as of: 3/22/17  7:49 AM.  Always use your most recent med list.                   Brand Name Dispense Instructions for use    CULTURELLE PO      Take 1 tablet by  mouth 2 times daily       fiber modular packet      1 packet by Per Feeding Tube route 2 times daily       loperamide 1 MG/5ML liquid    IMODIUM    200 mL    Take 20 mLs (4 mg) by mouth 4 times daily as needed for diarrhea       metoprolol 50 MG tablet    LOPRESSOR    60 tablet    50 mg BID.  May crush, mix with water and administer via feeding tube       nystatin Powd     1 each    Apply to peristomal skin with each pouch change until rash is resolved       opium tincture tincture     15 mL    Take 0.6 mLs (6 mg) by mouth every 6 hours as needed       * oxyCODONE 5 MG/5ML solution    ROXICODONE    500 mL    Take 5-10 mLs (5-10 mg) by mouth every 4 hours as needed for moderate to severe pain       * oxyCODONE 5 MG IR tablet    ROXICODONE    150 tablet    Take 2 tablets (10 mg) by mouth every 6 hours as needed for moderate to severe pain       traZODone 100 MG tablet    DESYREL    30 tablet    1 tablet (100 mg) by Per G Tube route At Bedtime       * warfarin 2.5 MG tablet    COUMADIN    30 tablet    Take 1 tablet (2.5 mg) by mouth daily       * warfarin 1 MG tablet    COUMADIN    60 tablet    Take 1-2 tablets daily or as directed by coumadin clinic.       Zinc Sulfate 220 (50 ZN) MG Tabs     30 tablet    220 mg daily.  OK to crush, mix with water and administer via feeding tube.       * Notice:  This list has 4 medication(s) that are the same as other medications prescribed for you. Read the directions carefully, and ask your doctor or other care provider to review them with you.

## 2017-03-22 NOTE — PROGRESS NOTES
ANTICOAGULATION FOLLOW-UP CLINIC VISIT    Patient Name:  Minerva Blanco  Date:  3/22/2017  Contact Type:  Telephone    SUBJECTIVE:     Patient Findings     Positives No Problem Findings           OBJECTIVE    INR   Date Value Ref Range Status   03/22/2017 2.14 (H) 0.86 - 1.14 Final       ASSESSMENT / PLAN  INR assessment THER    Recheck INR In: 5 DAYS    INR Location [facility]      Anticoagulation Summary as of 3/22/2017     INR goal 2.0-3.0   Today's INR 2.14   Maintenance plan No maintenance plan   Full instructions 3/22: 1.25 mg; 3/23: 2.5 mg; 3/24: 2.5 mg; 3/25: 2.5 mg; 3/26: 2.5 mg   Plan last modified Mary Garsia, RN (3/6/2017)   Next INR check 3/27/2017   Target end date     Indications   Long-term (current) use of anticoagulants [Z79.01] [Z79.01]  Atrial fibrillation (H) [I48.91] [I48.91]         Anticoagulation Episode Summary     INR check location     Preferred lab     Send INR reminders to [facility]    Comments as of 3/9: pt gets tube feedings @ HS - this results in diarrhea - ~ 4-7 episodes in 24 hr.      Anticoagulation Care Providers     Provider Role Specialty Phone number    Pedro Moreno MD Responsible Clinical Cardiac Electrophysiology [phone]            See the Encounter Report to view Anticoagulation Flowsheet and Dosing Calendar (Go to Encounters tab in chart review, and find the Anticoagulation Therapy Visit)    Spoke with Minerva. Today's INR was unplanned and done with other lab work. Did not change plan given on Monday. Minerva will have surgery in the next month and her surgeon has advised stopping warfarin 1 week prior. Asked Minerva to notify us when this has a set date. Message sent to Dr. Moreno and CLIVE John:    Sandra and Dr. Moreno,  Minerva was put on warfarin due to AF one month ago after developing paroxysmal AF after surgery. Your notes indicate a CHADSVASC score of 2. She will undergo surgery in the near future and the surgeon has recommended  stopping warfarin 1 week prior. It would seem that she could do this without bridging. Can we plan on her holding warfarin x 7 days with no bridge?  Thanks,  Alexia Pryor, RN  [facility]  [phone]      Dulce Pryor RN     Addendum: Message received from HAYDER Flores!   Thanks for your message! She can hold her warfarin without bridging.   Thanks much!   ZEENAT

## 2017-03-22 NOTE — NURSING NOTE
Minerva Blanco is a 72 year old female who presents for:  Chief Complaint   Patient presents with     Oncology Clinic Visit     Paroxysmal atrial fibrillation        Initial Vitals:  There were no vitals taken for this visit. Estimated body mass index is 19.14 kg/(m^2) as calculated from the following:    Height as of 2/22/17: 1.524 m (5').    Weight as of 2/22/17: 44.5 kg (98 lb).. There is no height or weight on file to calculate BSA. BP completed using cuff size: regular  Data Unavailable No LMP recorded. Patient is postmenopausal. Allergies and medications reviewed.     Medications: Medication refills not needed today.  Pharmacy name entered into [employer]: [id number] [address], [place] - [facility] [address]    Comments:     7 minutes for nursing intake (face to face time)   Alana Keller MA

## 2017-03-22 NOTE — MR AVS SNAPSHOT
Minerva Oglesbyenan   3/22/2017   Anticoagulation Therapy Visit    Description:  72 year old female   Provider:  Dulce Pryor, RN   Department:  [facility]           INR as of 3/22/2017     Today's INR 2.14      Anticoagulation Summary as of 3/22/2017     INR goal 2.0-3.0   Today's INR 2.14   Full instructions 3/22: 1.25 mg; 3/23: 2.5 mg; 3/24: 2.5 mg; 3/25: 2.5 mg; 3/26: 2.5 mg   Next INR check 3/27/2017    Indications   Long-term (current) use of anticoagulants [Z79.01] [Z79.01]  Atrial fibrillation (H) [I48.91] [I48.91]         March 2017 Details    Sun Mon Tue Wed Thu Fri Sat        1               2               3               4                 5               6               7               8               9               10               11                 12               13               14               15               16               17               18                 19               20               21               22      1.25 mg   See details      23      2.5 mg         24      2.5 mg         25      2.5 mg           26      2.5 mg         27            28               29               30               31                 Date Details   03/22 This INR check       Date of next INR:  3/27/2017         How to take your warfarin dose     To take:  1.25 mg Take 0.5 of a 2.5 mg tablet.    To take:  2.5 mg Take 1 of the 2.5 mg tablets.

## 2017-03-22 NOTE — LETTER
3/22/2017      RE: Minerva Blanco  [address] [facility] [id number]  [place] [address]       THORACIC SURGERY FOLLOW UP VISIT      Dear Dr. Carroll,  I saw Mrs. Minerva Blanco in follow-up today. The clinical summary follows:      PREOP DIAGNOSIS   1.  Chronic esophageal perforation with mediastinal phlegmon.    2.  Status post fundoplication.        PROCEDURE   1/9/2017  1.  Esophagogastroscopy.    2.  Laparoscopic lysis of adhesions.    3.  Laparoscopic proximal gastrectomy and gastrostomy tube placement.    4.  Left thoracotomy with excision of mediastinal phlegmon and distal esophagectomy.    5.  Thoracic duct ligation.    6.  Right tube thoracostomy.    7.  Left esophageal spit fistula.    8.  Bronchoscopy.        PRIOR PROCEDURES  1) Multiple endoscopies and pharyngostomy tube placement x 2 (for drainage of paraesophageal cavity)  2) Esophageal stent placement, attempted placement of biliary stent into the paraesophageal cavity (7/22/2016)  3) Esophageal stent removal, placement of retrograde gastroesophageal tube (10/23/2016)  4) Toupet fundoplication (1/2016)          COMPLICATIONS  Thoracotomy wound infection requiring antibiotics     INTERVAL STUDIES  None      TOB never  ETOH negative      SUBJECTIVE  Mrs. Blanco's diarrhea has finally resolved and she feels better. Her main issue is still some fatigue and chronic pain.    Incisions well healed  Spit fistula patent and viable    From a personal perspective, she is here with her  Enrique.      IMPRESSION   (K22.3) Esophageal perforation  (primary encounter diagnosis)  (G89.29) Other chronic pain  (Z79.01) On warfarin therapy  (Z90.49) H/O esophagectomy       71 year-old female 2.5 months S/P esophagectomy and spit fistula for end-stage GERD  Chronic pain        PLAN  I spent a total of 25 minutes with Ms. Minerva Blanco and Enrique, more than 50% of which were spent in counseling, coordination of care, and face-to-face time. I reviewed the  plan as follows:  1) Look for OR schedule for reconstruction  2) Chem 10 and albumin, prealbumin today  3) Refill narcotic pain medications for another month and reschedule for pain clinic to help with long-term management  4) Return to clinic and PAC about 1 week prior to surgery  5) Discontinue warfarin 1 week prior alejandra surgery  They had all their questions answered and were in agreement with the plan.  I appreciate the opportunity to participate in the care of your patient and will keep you updated.  Sincerely,    Jens Wise MD

## 2017-03-27 ENCOUNTER — ANTICOAGULATION THERAPY VISIT (OUTPATIENT)
Dept: ANTICOAGULATION | Facility: CLINIC | Age: 72
End: 2017-03-27

## 2017-03-27 DIAGNOSIS — Z79.01 LONG-TERM (CURRENT) USE OF ANTICOAGULANTS: ICD-10-CM

## 2017-03-27 DIAGNOSIS — I48.91 ATRIAL FIBRILLATION, UNSPECIFIED TYPE (H): ICD-10-CM

## 2017-03-27 LAB — INR PPP: 2.5

## 2017-03-27 NOTE — PROGRESS NOTES
ANTICOAGULATION FOLLOW-UP CLINIC VISIT    Patient Name:  Minerva Blanco  Date:  3/27/2017  Contact Type:  Telephone    SUBJECTIVE:     Patient Findings     Comments On 4/17, patient will have her esophagus reconnected.  [facility] nurse, Chelo,  is going to find out what the surgeon would like her to do with her coumadin prior to this.           OBJECTIVE    INR   Date Value Ref Range Status   03/27/2017 2.5  Final       ASSESSMENT / PLAN  INR assessment THER    Recheck INR In: 1 WEEK    INR Location [facility]      Anticoagulation Summary as of 3/27/2017     INR goal 2.0-3.0   Today's INR 2.5   Maintenance plan 1.25 mg (2.5 mg x 0.5) on Wed; 2.5 mg (2.5 mg x 1) all other days   Full instructions 1.25 mg on Wed; 2.5 mg all other days   Weekly total 16.25 mg   Plan last modified Gloria Richardson RN (3/27/2017)   Next INR check 4/3/2017   Target end date     Indications   Long-term (current) use of anticoagulants [Z79.01] [Z79.01]  Atrial fibrillation (H) [I48.91] [I48.91]         Anticoagulation Episode Summary     INR check location     Preferred lab     Send INR reminders to [facility]    Comments as of 3/9: pt gets tube feedings @ HS - this results in diarrhea - ~ 4-7 episodes in 24 hr.      Anticoagulation Care Providers     Provider Role Specialty Phone number    Pedro Moreno MD Responsible Clinical Cardiac Electrophysiology [phone]            See the Encounter Report to view Anticoagulation Flowsheet and Dosing Calendar (Go to Encounters tab in chart review, and find the Anticoagulation Therapy Visit)  Spoke with Chelo [facility] nurse.    Gloria Richardson, ROMAINE

## 2017-03-27 NOTE — MR AVS SNAPSHOT
Minerva Blanco   3/27/2017   Anticoagulation Therapy Visit    Description:  72 year old female   Provider:  Gloria Richardson, RN   Department:  [facility]           INR as of 3/27/2017     Today's INR 2.5      Anticoagulation Summary as of 3/27/2017     INR goal 2.0-3.0   Today's INR 2.5   Full instructions 1.25 mg on Wed; 2.5 mg all other days   Next INR check 4/3/2017    Indications   Long-term (current) use of anticoagulants [Z79.01] [Z79.01]  Atrial fibrillation (H) [I48.91] [I48.91]         March 2017 Details    Sun Mon Tue Wed Thu Fri Sat        1               2               3               4                 5               6               7               8               9               10               11                 12               13               14               15               16               17               18                 19               20               21               22               23               24               25                 26               27      2.5 mg   See details      28      2.5 mg         29      1.25 mg         30      2.5 mg         31      2.5 mg           Date Details   03/27 This INR check               How to take your warfarin dose     To take:  1.25 mg Take 0.5 of a 2.5 mg tablet.    To take:  2.5 mg Take 1 of the 2.5 mg tablets.           April 2017 Details    Sun Mon Tue Wed Thu Fri Sat           1      2.5 mg           2      2.5 mg         3            4               5               6               7               8                 9               10               11               12               13               14               15                 16               17               18               19               20               21               22                 23               24               25               26               27               28               29                 30                      Date Details   No  additional details    Date of next INR:  4/3/2017         How to take your warfarin dose     To take:  2.5 mg Take 1 of the 2.5 mg tablets.

## 2017-04-03 ENCOUNTER — ANTICOAGULATION THERAPY VISIT (OUTPATIENT)
Dept: ANTICOAGULATION | Facility: CLINIC | Age: 72
End: 2017-04-03

## 2017-04-03 DIAGNOSIS — I48.91 ATRIAL FIBRILLATION, UNSPECIFIED TYPE (H): ICD-10-CM

## 2017-04-03 DIAGNOSIS — Z79.01 LONG-TERM (CURRENT) USE OF ANTICOAGULANTS: ICD-10-CM

## 2017-04-03 LAB — INR PPP: 2.8

## 2017-04-03 NOTE — PROGRESS NOTES
ANTICOAGULATION FOLLOW-UP CLINIC VISIT    Patient Name:  Minerva Blanco  Date:  4/3/2017  Contact Type:  Telephone    SUBJECTIVE:     Patient Findings     Comments Had diarrhea 4/2/17. She is scheduled for surgery 4/17/17--dissection radical neck (reconnecting esophagus). She will hold coumadin x 7 days before procedure, no bridging needed (see anticoag note dated 3/22/17).             OBJECTIVE    INR   Date Value Ref Range Status   04/03/2017 2.8  Final       ASSESSMENT / PLAN  INR assessment THER    Recheck INR In: 1 WEEK    [facility] Location [facility]      Anticoagulation Summary as of 4/3/2017     INR goal 2.0-3.0   Today's INR 2.8   Maintenance plan 1.25 mg (2.5 mg x 0.5) on Wed; 2.5 mg (2.5 mg x 1) all other days   Full instructions 4/10: Hold; 4/11: Hold; 4/12: Hold; 4/13: Hold; 4/14: Hold; 4/15: Hold; 4/16: Hold; Otherwise 1.25 mg on Wed; 2.5 mg all other days   Weekly total 16.25 mg   Plan last modified Gloria Richardson RN (3/27/2017)   Next INR check 4/17/2017   Target end date     Indications   Long-term (current) use of anticoagulants [Z79.01] [Z79.01]  Atrial fibrillation (H) [I48.91] [I48.91]         Anticoagulation Episode Summary     INR check location     Preferred lab     [facility]    Comments as of 3/9: pt gets tube feedings @ HS - this results in diarrhea - ~ 4-7 episodes in 24 hr.  HIPPA Form received 3/28/17 ok to speak with  Richard       Anticoagulation Care Providers     Provider Role Specialty Phone number    Pedro Moreno MD Responsible Clinical Cardiac Electrophysiology [phone]            See the Encounter Report to view Anticoagulation Flowsheet and Dosing Calendar (Go to Encounters tab in chart review, and find the Anticoagulation Therapy Visit)    Spoke with Chelo CASTRO.  Minerva is scheduled for surg 4/17/17, she will hold coumadin x 7 days before surgery, no bridging.      Dulce Cuevas RN

## 2017-04-03 NOTE — MR AVS SNAPSHOT
Minerva Blanco   4/3/2017   Anticoagulation Therapy Visit    Description:  72 year old female   Provider:  Dulce Cuevas, RN   Department:  [facility]           INR as of 4/3/2017     Today's INR 2.8      Anticoagulation Summary as of 4/3/2017     INR goal 2.0-3.0   Today's INR 2.8   Full instructions 4/10: Hold; 4/11: Hold; 4/12: Hold; 4/13: Hold; 4/14: Hold; 4/15: Hold; 4/16: Hold; Otherwise 1.25 mg on Wed; 2.5 mg all other days   Next INR check 4/17/2017    Indications   Long-term (current) use of anticoagulants [Z79.01] [Z79.01]  Atrial fibrillation (H) [I48.91] [I48.91]         Description     Holding for surgery 4/17/17--no bridging necessary      April 2017 Details    Sun Mon Tue Wed Thu Fri Sat           1                 2               3      2.5 mg   See details      4      2.5 mg         5      1.25 mg         6      2.5 mg         7      2.5 mg         8      2.5 mg           9      2.5 mg         10      Hold         11      Hold         12      Hold         13      Hold         14      Hold         15      Hold           16      Hold         17            18               19               20               21               22                 23               24               25               26               27               28               29                 30                      Date Details   04/03 This INR check       Date of next INR:  4/17/2017         How to take your warfarin dose     To take:  1.25 mg Take 0.5 of a 2.5 mg tablet.    To take:  2.5 mg Take 1 of the 2.5 mg tablets.    Hold Do not take your warfarin dose. See the Details table to the right for additional instructions.

## 2017-04-10 ENCOUNTER — TELEPHONE (OUTPATIENT)
Dept: SURGERY | Facility: CLINIC | Age: 72
End: 2017-04-10

## 2017-04-10 DIAGNOSIS — G47.01 INSOMNIA DUE TO MEDICAL CONDITION: ICD-10-CM

## 2017-04-10 DIAGNOSIS — G89.29 OTHER CHRONIC PAIN: ICD-10-CM

## 2017-04-10 RX ORDER — OXYCODONE HYDROCHLORIDE 5 MG/1
10 TABLET ORAL EVERY 6 HOURS PRN
Qty: 80 TABLET | Refills: 0 | Status: SHIPPED | OUTPATIENT
Start: 2017-04-10 | End: 2017-04-12

## 2017-04-10 RX ORDER — DOXEPIN 3 MG/1
3 TABLET, FILM COATED ORAL
Qty: 5 TABLET | Refills: 0 | Status: ON HOLD | OUTPATIENT
Start: 2017-04-10 | End: 2017-05-03

## 2017-04-10 NOTE — TELEPHONE ENCOUNTER
"Received a call from Minerva stating that she will not have enough oxycodone to get her through to her surgery date of 4/17. She says she will be out of this medication today or tomorrow. When she saw Dr. Adan in clinic 3/22, he authorized a refill of 150 pills with instructions to take 2 pills every 6 hours. This equals 8 pills per day. To get to her surgery date, she needs ~80 more.     She is also requesting a refill of her Ambien that was last filled in December 2016.    She sees Dr. Wise in clinic this week to discuss her upcoming surgery.    I will refill her oxycodone for a quantity of 80 tablets. She is scheduled to see the pain clinic in May for future management of her chronic pain. I will refill her Ambien for a quantity of 5 tablets as she states she does not use this \"a lot\".  "

## 2017-04-11 NOTE — PROGRESS NOTES
THORACIC SURGERY FOLLOW UP VISIT    Dear Dr. Carroll,  I saw Mrs. Minerva Blanco in follow-up today. The clinical summary follows:      PREOP DIAGNOSIS   1.  Chronic esophageal perforation with mediastinal phlegmon.    2.  Status post fundoplication.        PROCEDURE   1/9/2017  1.  Esophagogastroscopy.    2.  Laparoscopic lysis of adhesions.    3.  Laparoscopic proximal gastrectomy and gastrostomy tube placement.    4.  Left thoracotomy with excision of mediastinal phlegmon and distal esophagectomy.    5.  Thoracic duct ligation.    6.  Right tube thoracostomy.    7.  Left esophageal spit fistula.    8.  Bronchoscopy.        PRIOR PROCEDURES  1) Multiple endoscopies and pharyngostomy tube placement x 2 (for drainage of paraesophageal cavity)  2) Esophageal stent placement, attempted placement of biliary stent into the paraesophageal cavity (7/22/2016)  3) Esophageal stent removal, placement of retrograde gastroesophageal tube (10/23/2016)  4) Toupet fundoplication (1/2016)          COMPLICATIONS  Thoracotomy wound infection requiring antibiotics     INTERVAL STUDIES  None      TOB never  ETOH negative      SUBJECTIVE  She is continuing to require oxycodone, but she is emotionally and physically ready for surgery      From a personal perspective, she is here with her  Enrique.        IMPRESSION   (Z90.49) H/O esophagectomy  (K21.9) Gastroesophageal reflux disease without esophagitis     72 year-old female S/P esophagectomy and spit fistula for end-stage GERD      PLAN  I spent a total of 40 minutes with Ms. Minerva Blanco and Enrique, more than 50% of which were spent in counseling, coordination of care, and face-to-face time. I reviewed the plan as follows:  1) Procedure planned: retrosternal gastric pull-up. I reviewed the indications, risks, and benefits of the procedure with Mrs. Minerva Blanco and her , Enrique. We discussed the intraoperative risks of bleeding, injury to vital organs, and potential  for esophageal discontinuity.  Potential postoperative complications include, but are not limited to, major respiratory events, arrhythmia, bleeding, infection, reoperation, anastomotic leak, conduit necrosis with discontinuity, vocal cord palsy, and death. I explained the anticipated hospital course (10-14 days) and postoperative recovery including pain control, tube feedings, dietary changes, and overall drain management. We discussed the importance of lifetime awareness to limit lifting heavy weights to prevent hiatal herniation as well as the need for aspiration precautions.    Necessary Tests & Appointments: PAC  Pain Control Plan: Pain consult  Anticoagulation Plan: Hold coumadin x7d preop with no bridge as per her anticoagulation clinic recommendations.    They had all their questions answered and were in agreement with the plan.  I appreciate the opportunity to participate in the care of your patient and will keep you updated.  Sincerely,

## 2017-04-12 ENCOUNTER — OFFICE VISIT (OUTPATIENT)
Dept: SURGERY | Facility: CLINIC | Age: 72
End: 2017-04-12

## 2017-04-12 ENCOUNTER — ANESTHESIA EVENT (OUTPATIENT)
Dept: SURGERY | Facility: CLINIC | Age: 72
DRG: 326 | End: 2017-04-12
Payer: MEDICARE

## 2017-04-12 ENCOUNTER — ALLIED HEALTH/NURSE VISIT (OUTPATIENT)
Dept: SURGERY | Facility: CLINIC | Age: 72
End: 2017-04-12

## 2017-04-12 ENCOUNTER — RECORDS - HEALTHEAST (OUTPATIENT)
Dept: ADMINISTRATIVE | Facility: OTHER | Age: 72
End: 2017-04-12

## 2017-04-12 ENCOUNTER — OFFICE VISIT (OUTPATIENT)
Dept: SURGERY | Facility: CLINIC | Age: 72
End: 2017-04-12
Attending: THORACIC SURGERY (CARDIOTHORACIC VASCULAR SURGERY)
Payer: MEDICARE

## 2017-04-12 VITALS
SYSTOLIC BLOOD PRESSURE: 114 MMHG | DIASTOLIC BLOOD PRESSURE: 64 MMHG | OXYGEN SATURATION: 100 % | BODY MASS INDEX: 17.63 KG/M2 | WEIGHT: 89.8 LBS | TEMPERATURE: 98.1 F | RESPIRATION RATE: 16 BRPM | HEIGHT: 60 IN | HEART RATE: 77 BPM

## 2017-04-12 VITALS
BODY MASS INDEX: 17.63 KG/M2 | RESPIRATION RATE: 16 BRPM | DIASTOLIC BLOOD PRESSURE: 64 MMHG | WEIGHT: 89.8 LBS | SYSTOLIC BLOOD PRESSURE: 114 MMHG | OXYGEN SATURATION: 98 % | TEMPERATURE: 98.1 F | HEART RATE: 77 BPM | HEIGHT: 60 IN

## 2017-04-12 DIAGNOSIS — Z01.818 PRE-OPERATIVE GENERAL PHYSICAL EXAMINATION: Primary | ICD-10-CM

## 2017-04-12 DIAGNOSIS — Z90.49 HISTORY OF ESOPHAGECTOMY: ICD-10-CM

## 2017-04-12 DIAGNOSIS — D64.9 ANEMIA, UNSPECIFIED TYPE: ICD-10-CM

## 2017-04-12 DIAGNOSIS — Z98.890 HISTORY OF ESOPHAGECTOMY: ICD-10-CM

## 2017-04-12 DIAGNOSIS — K21.9 GASTROESOPHAGEAL REFLUX DISEASE WITHOUT ESOPHAGITIS: Primary | ICD-10-CM

## 2017-04-12 DIAGNOSIS — Z01.818 PREOP EXAMINATION: Primary | ICD-10-CM

## 2017-04-12 PROCEDURE — 99212 OFFICE O/P EST SF 10 MIN: CPT

## 2017-04-12 PROCEDURE — 99211 OFF/OP EST MAY X REQ PHY/QHP: CPT | Mod: ZF

## 2017-04-12 RX ORDER — CHOLESTYRAMINE 4 G/9G
1 POWDER, FOR SUSPENSION ORAL DAILY
COMMUNITY
End: 2019-05-08

## 2017-04-12 RX ORDER — ACETAMINOPHEN 325 MG/1
975 TABLET ORAL ONCE
Status: CANCELLED | OUTPATIENT
Start: 2017-04-12 | End: 2017-04-12

## 2017-04-12 RX ORDER — GABAPENTIN 300 MG/1
300 CAPSULE ORAL ONCE
Status: CANCELLED | OUTPATIENT
Start: 2017-04-12 | End: 2017-04-12

## 2017-04-12 ASSESSMENT — LIFESTYLE VARIABLES: TOBACCO_USE: 1

## 2017-04-12 ASSESSMENT — PAIN SCALES - GENERAL: PAINLEVEL: NO PAIN (0)

## 2017-04-12 NOTE — PHARMACY - PREOPERATIVE ASSESSMENT CENTER
ANTICOAGULATION DOCUMENTATION - Preoperative Assessment Center (PAC) Pharmacist   Patient seen and interviewed during time of PAC Clinic appointment April 12, 2017.     Based on profile review and patient interview Minerva Blanco has been on warfarin for treatment of atrial fibrillation since January 2017.  Current dose of 1.25 mg on Wednesday and 2.5 mg on every other day of the week. Currently holding.    It is prescribed by Dr. Laureano and managed by the [facility].  The expected duration of therapy is undetermined.    Current medications that may interact with this include cholestyramine.     Minerva Blanco is scheduled for surgery on 4/17/17 with Dr. Wise and the perioperative anticoagulation plan outlined by [facility] is to hold warfarin starting on 4/10 and hold until after surgery.  No need for bridging. Resumption of warfarin postoperatively per Dr. Wise's team once hemostasis achieved.  Please see anticoag note dated 3/22/17 for more details.   This plan may require re-assessment and modification by her primary team in the perioperative setting depending on patients clinical situation.        Juanjose Nicolas RPH  April 12, 2017  9:21 AM

## 2017-04-12 NOTE — NURSING NOTE
Minerva Blanco is a 72 year old female who presents for:  Chief Complaint   Patient presents with     Oncology Clinic Visit     return patient visit for pre-op follow up related to Long-term (current) use of anticoagulants [Z79.01]        Initial Vitals:  /64  Pulse 77  Temp 98.1  F (36.7  C) (Oral)  Resp 16  Ht 1.524 m (5')  Wt 40.7 kg (89 lb 12.8 oz)  SpO2 100%  BMI 17.54 kg/m2 Estimated body mass index is 17.54 kg/(m^2) as calculated from the following:    Height as of this encounter: 1.524 m (5').    Weight as of this encounter: 40.7 kg (89 lb 12.8 oz).. Body surface area is 1.31 meters squared. BP completed using cuff size: NA (Not Taken)  No Pain (0) No LMP recorded. Patient is postmenopausal. Allergies and medications reviewed.     Medications: Medication refills not needed today.  Pharmacy name entered into [employer]: [id number] [address] [place], [place] - [address]    Comments: patient denied pain/discomfort. Patient had an appointment prior to this appointment. Patient had vitals, allergies and medications taken/reconciled today.     5 minutes for nursing intake (face to face time)   Dalton aJcob CMA

## 2017-04-12 NOTE — PROGRESS NOTES
Preoperative Assessment Center medication history for April 12, 2017 is complete.  See Epic admission navigator for allergy information, pharmacy, prior to admission medications and immunization status.    Operating room staff will still need to confirm medications and last dose information on day of surgery.     Medication history interview sources:  patient, patient's med list.     Changes made to PTA medication list (reason)  Added: benadryl, acetaminophen (1x/day), cholestyramine, liquid MVI.   Deleted: opium tincture - not using any more because not covered by insurance.   Changed: immodium sig updated, sig/dose on warfarin.   Metoprolol - patient reports taking 1/2 tab (25 mg BID), pharmacy had 1 tab (50 mg) BID on file.  No notes regarding this dose reduction, but per patient her home care RN contacted the cardiologist about some low HRs and they agreed to do the 1/2 tab BID.  This was approx 1.5 months ago.     Additional medication history information (including reliability of information, actions taken by pharmacist):None  Prior to Admission medications    Medication Sig Last Dose Taking? Auth Provider   DiphenhydrAMINE HCl (BENADRYL PO) Take 25 mg by mouth nightly as needed Taking Yes Unknown, Entered By History   ACETAMINOPHEN PO Take 500-1,000 mg by mouth every 8 hours as needed for pain Taking Yes Unknown, Entered By History   OXYCODONE HCL PO Take 5-10 mg by mouth every 6 hours as needed Taking Yes Unknown, Entered By History   cholestyramine (QUESTRAN) 4 G Packet Take 1 packet by mouth daily Taking Yes Unknown, Entered By History   multivitamins with minerals (CERTAVITE/CEROVITE) LIQD liquid Take 15 mLs by mouth daily Taking Yes Unknown, Entered By History   nystatin POWD Apply to peristomal skin with each pouch change until rash is resolved Taking Yes Sarina Serna APRN CNS   warfarin (COUMADIN) 2.5 MG tablet Take 1 tablet (2.5 mg) by mouth daily  Patient taking differently: Take 1.25  mg by mouth on Wednesday and take 2.5 mg by mouth on all other days of the week. currently on hold Yes Sandra John APRN CNP   metoprolol (LOPRESSOR) 50 MG tablet 50 mg BID.  May crush, mix with water and administer via feeding tube  Patient taking differently: 25 mg by Per G Tube route 2 times daily May crush, mix with water and administer via feeding tube Taking Yes Tasia Rivera APRN CNS   traZODone (DESYREL) 100 MG tablet 1 tablet (100 mg) by Per G Tube route At Bedtime Taking Yes Saul Rogers PA-C   loperamide (IMODIUM) 1 MG/5ML liquid Take 20 mLs (4 mg) by mouth 4 times daily as needed for diarrhea  Patient taking differently: Take 4 mg by mouth 2 times daily  Taking Yes Saul Rogers PA-C   Lactobacillus Rhamnosus, GG, (CULTURELLE PO) Take 1 tablet by mouth 2 times daily  Taking Yes Reported, Patient   doxepin (SILENOR) 3 MG tablet Take 1 tablet (3 mg) by mouth nightly as needed for sleep hasnt started yet  Sarina Serna APRN CNS   Zinc Sulfate 220 (50 ZN) MG TABS 220 mg daily.  OK to crush, mix with water and administer via feeding tube.  Patient not taking: Reported on 4/12/2017 Not Taking  Tasia Rivera APRN CNS       Medication history completed by: Juanjose Nicolas [facility]

## 2017-04-12 NOTE — PHARMACY - PREOPERATIVE ASSESSMENT CENTER
PREOPERATIVE PAIN CONSULT FOR POSTOPERATIVE PAIN MANAGEMENT  Minerva Blanco was seen and interviewed during time of PAC Clinic appointment April 12, 2017. The following recommendations ONLY apply for the planned surgical procedure with Dr Wise on 4/17/17 at the [facility] for Radical neck dissection, spit fistulae takedown, lap J tube, pharyngostomy tube, gastric pull up, upper endoscopy and flex bronch.  These postoperative recommendations are intended for patients admitted to the hospital after surgery. These recommendations are only valid for 30 days from the date of service. If there are significant changes in opioid dosing between today and day of surgery the below recommendations may have to be adjusted.     Based on patient interview:     - OUTPATIENT MEDICATIONS (related to pain management):  -- Long-acting opioid: none  -- Short-acting opioid: oxycodone 5-10 mg (usually uses 10 mg dose) every 6 hr PRN pain (takes avg of 30-40 mg/day)  -- Oral adjuvant(s): acetaminophen 1000 mg PO PRN (usu 1x/day)   -- Topicals: none  -- Bowel Regimen: none, pt reports diarrhea related to TF.      ASSESSMENT:   Minerva Blanco has a history of difficult to control postoperative pain with her procedure in January 2017 who remains on oxycodone PRN for pain that is now primarily in her mid back.  She has a history of abdominal/thoracic pain that is secondary to esophageal perforation that has improved since last surgery.  She reports continuous use of opioid pain medications, but is actively trying to wean down on medications. Other PMH significant for fibromyalgia, MS, IBS, GERD, history of breast cancer. Outpatient opioids prescribed by Dr. Wise, but patient is scheduled to [facility].  She is optimistic to eventually wean to off on her opioid pain medicaitons.  She reports good pain relief with the epidural she received back in  January and was wondering if this would be a possibility for the upcoming procedure.  She has been able to taper down on her opioids to a dose that is less than what she was on leading into her January surgery and I am optimistic this will help with her postoperative pain control.  Would recommend follow up with inpatient pain service to facilitate handoff to Dr. Enriquez in the [facility].       Outpatient opioid oral morphine equivalent (OME): 45-60 OME/day  Patient has received hydromorphone PCA in the past without complication.      Pain medications tried in the past:   Morphine- itching  Hydromorphone - previously caused some itching, but patient tolerated well during last hospitalization and open to using this again for postop pain management.   Gabapentin - worked well for postop pain, tapered off as an outpatient once postop pain had improved. Willing to try again for postop pain management.     RECOMMENDATIONS:   The following pain management recommendations are made based on information from today's visit and should not replace medical decision-making based on patient condition at the time of surgery or postoperatively.      - PREOPERATIVE:-- Continue short acting opioid - oxycodone 5-10 mg PO q6 hr PRN   -- Continue adjuvants/nonopioid analgesics: Acetaminophen.     --  Before surgery recommend gabapentin 300 mg PO x 1 dose in pre-op area (will be written by Dr. Jenkins preoperatively).   -- Before surgery consider acetaminophen 1000 mg PO in pre-op (will be written by Dr. Jenkins preoperatively)   -- Before surgery consider celecoxib 200 mg PO in pre-op (defer to Dr. Wise's team, if elected to proceed with this will need to)    - INTRAOPERATIVE (Anesthesiologist/CRNA to consider):   -- Regional anesthesia: consider epidural vs TAP block - defer to surgery/RAPS team.  Patient reports she did well with the epidural with her last procedure.   -- Ketamine IV intraoperatively  --  Dexmedetomidine IV intraoperatively  -- Avoid remifentanil as able - to reduce risk of developing hyperalgesia    - POSTOPERATIVE MANAGEMENT:  Place pain management consult to assist with acute postoperative pain management.    Opioid analgesic:   -- HYDROmorphone (Dilaudid) PCA doses 0.2-0.4 mg Q 10 min lockout interval with NO CONTINUOUS RATE (patients outpatient Oral morphine equivalent is <60 mg OME/day).  If patient is showing s/sx sedation or respiratory depression reduce PCA doses as necessary.    -- if itching occurs could consider adding low dose diphenhydramine or hydroxyzine  -- Hold other PO and IV opioids while on PCA.    Nonopioid analgesics:   -- defer use of NSAID to surgeon  -- acetaminophen 650 mg PO every 6 hr scheduled   -- gabapentin 100 mg PO every 8 hours scheduled, will consider further increases in while inpatient.   -- Continue doxepin 3 mg PO qHS PRN if patient does well with this preoperatively.     Muscle Relaxant:   --methocarbamol 250-500 mg PO Q6 hr PRN muscle spasm OR  --If unable to tolerate PO meds, use Diazepam (Valium) 2.5 mg IV Q 6 hr PRN muscle spasm    Stool softeners/Laxatives:   -- Defer bowel regimen to primary team    Other:  -- Recommend close monitoring of respiratory status postoperatively with capnography and/or SpO2 monitoring.       The inpatient pain service will follow up on patient after consult is placed and offer further recommendations for pain management.  If immediate assistance is needed please contact the pain service at the number below.     Juanjose Nicolas [facility]  April 12, 2017  9:33 AM    If questions or concerns, please contact the Inpatient Pain Management Service:  Call [phone] after hours, weekends and holidays.   Page [phone] from 8 AM - 3 PM Mon - Fri.

## 2017-04-12 NOTE — MR AVS SNAPSHOT
After Visit Summary   4/12/2017    Minerva Blanco    MRN: [id number]           Patient Information     Date Of Birth          1945        Visit Information        Provider Department      4/12/2017 8:30 AM Pharmacist, Casey Beckford [facility] Assessment [facility]        Today's Diagnoses     Preop examination    -  1       Follow-ups after your visit        Your next 10 appointments already scheduled     Apr 12, 2017 10:10 AM CDT   (Arrive by 9:55 AM)   PAC Anesthesia Consult with Casey Pac Anesthesiologist   [facility] Preoperative Assessment [place] ([facility])    [address] [place] [id number] [phone]            Apr 12, 2017 10:30 AM CDT   (Arrive by 10:15 AM)   PAC RN ASSESSMENT with Casey Pac Rn   [facility] Preoperative Assessment [place] ([facility])    [address] [place] [id number]   [phone]            Apr 12, 2017 11:15 AM CDT   LAB with CASEY LAB   [facility] Lab ([facility])    [address] [place] [id number] [phone]           Patient must bring picture ID.  Patient should be prepared to give a urine specimen  Please do not eat 10-12 hours before your appointment if you are coming in fasting for labs on lipids, cholesterol, or glucose (sugar).  Pregnant women should follow their Care Team instructions. Water with medications is okay. Do not drink coffee or other fluids.   If you have concerns about taking  your medications, please ask at office or if scheduling via [employer], send a message by clicking on Secure Messaging, Message Your Care Team.            Apr 12, 2017 12:00 PM CDT   (Arrive by 11:45 AM)   Return Visit with Jens Wise MD   [facility] ([facility])    [address] [place] [id number] [phone]            Apr 17, 2017    Procedure with Jens Wise MD   [facility], [place], Same Day Surgery (--)    [address] [facility] [address] [phone]            May 04, 2017  2:30 PM CDT   (Arrive by 2:15 PM)   New Patient Visit with Nilson Enriquez MD   [facility] ([facility])    [address] [facility]  4th Floor  [facility] [address] [phone]              Who to contact     Please call your clinic at [phone] to:    Ask questions about your health    Make or cancel appointments    Discuss your medicines    Learn about your test results    Speak to your doctor   If you have compliments or concerns about an experience at your clinic, or if you wish to file a complaint, please contact [facility] Patient Relations at [phone] or email us at [email or web address]         Additional Information About Your Visit        [employer] Information     [employer] is an electronic gateway that provides easy, online access to your medical records. With [employer], you can request a clinic appointment, read your test results, renew a prescription or communicate with your care team.     To sign up for [employer] visit the website at [email or web address]   You will be asked to enter the access code listed below, as well as some personal information. Please follow the directions to create your username and password.     Your access code is: [email or web address]  Expires: 2017  7:30 AM     Your access code will  in 90 days. If you need help or a new code, please contact your [facility] or call [phone] for assistance.        Care EveryWhere ID     This is your Care EveryWhere ID. This could be used by other organizations to access your [place] medical records  [id number]         Blood Pressure from Last 3 Encounters:   17 114/64   17 117/61   17 117/61    Weight from Last 3  Encounters:   04/12/17 40.7 kg (89 lb 12.8 oz)   02/22/17 44.5 kg (98 lb)   02/22/17 44.7 kg (98 lb 9.6 oz)              Today, you had the following     No orders found for display         Today's Medication Changes          These changes are accurate as of: 4/12/17  9:44 AM.  If you have any questions, ask your nurse or doctor.               These medicines have changed or have updated prescriptions.        Dose/Directions    loperamide 1 MG/5ML liquid   Commonly known as:  IMODIUM   This may have changed:  when to take this   Used for:  Bowel habit changes        Dose:  4 mg   Take 20 mLs (4 mg) by mouth 4 times daily as needed for diarrhea   Quantity:  200 mL   Refills:  0       metoprolol 50 MG tablet   Commonly known as:  LOPRESSOR   This may have changed:    - how much to take  - how to take this  - when to take this  - additional instructions   Used for:  Essential hypertension        50 mg BID.  May crush, mix with water and administer via feeding tube   Quantity:  60 tablet   Refills:  3       OXYCODONE HCL PO   This may have changed:  Another medication with the same name was removed. Continue taking this medication, and follow the directions you see here.   Changed by:  Pharmacist, Casey Pac        Dose:  5-10 mg   Take 5-10 mg by mouth every 6 hours as needed   Refills:  0       warfarin 2.5 MG tablet   Commonly known as:  COUMADIN   This may have changed:    - how much to take  - how to take this  - when to take this  - additional instructions  - Another medication with the same name was removed. Continue taking this medication, and follow the directions you see here.   Used for:  Paroxysmal atrial fibrillation (H)   Changed by:  Sandra John APRN CNP        Dose:  2.5 mg   Take 1 tablet (2.5 mg) by mouth daily   Quantity:  30 tablet   Refills:  1         Stop taking these medicines if you haven't already. Please contact your care team if you have questions.     fiber modular packet   Stopped  by:  Pharmacist,  Pac           opium tincture tincture   Stopped by:  Pharmacist, [facility]                    Primary Care Provider Office Phone # Fax #    Andrea Nino [phone]       [facility] [address] [address]        Thank you!     Thank you for choosing [facility] PREOPERATIVE ASSESSMENT CENTER  for your care. Our goal is always to provide you with excellent care. Hearing back from our patients is one way we can continue to improve our services. Please take a few minutes to complete the written survey that you may receive in the mail after your visit with us. Thank you!             Your Updated Medication List - Protect others around you: Learn how to safely use, store and throw away your medicines at www.disposemymeds.org.          This list is accurate as of: 4/12/17  9:44 AM.  Always use your most recent med list.                   Brand Name Dispense Instructions for use    ACETAMINOPHEN PO      Take 500-1,000 mg by mouth every 8 hours as needed for pain       BENADRYL PO      Take 25 mg by mouth nightly as needed       cholestyramine 4 G Packet    QUESTRAN     Take 1 packet by mouth daily       CULTURELLE PO      Take 1 tablet by mouth 2 times daily       doxepin 3 MG tablet    SILENOR    5 tablet    Take 1 tablet (3 mg) by mouth nightly as needed for sleep       loperamide 1 MG/5ML liquid    IMODIUM    200 mL    Take 20 mLs (4 mg) by mouth 4 times daily as needed for diarrhea       metoprolol 50 MG tablet    LOPRESSOR    60 tablet    50 mg BID.  May crush, mix with water and administer via feeding tube       multivitamins with minerals Liqd liquid      Take 15 mLs by mouth daily       nystatin Powd     1 each    Apply to peristomal skin with each pouch change until rash is resolved       OXYCODONE HCL PO      Take 5-10 mg by mouth every 6 hours as needed       traZODone 100 MG tablet    DESYREL    30 tablet    1 tablet (100 mg) by Per G Tube route At  Bedtime       warfarin 2.5 MG tablet    COUMADIN    30 tablet    Take 1 tablet (2.5 mg) by mouth daily       Zinc Sulfate 220 (50 ZN) MG Tabs     30 tablet    220 mg daily.  OK to crush, mix with water and administer via feeding tube.

## 2017-04-12 NOTE — PATIENT INSTRUCTIONS
Using an Incentive Spirometer: 5x's/day and 5x's each time.  Soon after your surgery, a nurse or therapist will teach you breathing exercises. These keep your lungs clear, strengthen your breathing muscles, and help prevent complications.  The exercises include doing a deep-breathing exercise using a device called an incentive spirometer.  To do these exercises, you will breathe in through your mouth and not your nose. The incentive spirometer only works correctly if you breathe in through your mouth.  Four steps to clear lungs     Deep breathing expands the lungs, aids circulation, and helps prevent pneumonia.   1. Exhale normally.    Relax and breathe out.  2. Place your lips tightly around the mouthpiece.    Make sure the device is upright and not tilted.  3. Inhale as much air as you can through the mouthpiece (don't breath through your nose).    Inhale slowly and deeply.    Hold your breath long enough to keep the balls or disk raised for at least 3 seconds.    If you re inhaling too quickly, your device may make a tone. If you hear this tone, inhale more slowly.  4. Repeat the exercise regularly.    Do this exercise every hour while you're awake, or as your health care provider instructs.    You will also be taught coughing exercises and be asked to do them regularly on your own.    1162-4474 [employer]. [address] [place] [address]. All rights reserved. This information is not intended as a substitute for professional medical care. Always follow your healthcare professional's instructions.    AFTER YOUR SURGERY  Breathing exercises   Breathing exercises help you recover faster. Take deep breaths and let the air out slowly. This will:     Help you wake up after surgery.    Help prevent complications like pneumonia.  Preventing complications will help you go home sooner.   We may give you a breathing device (incentive spirometer) to encourage you to breathe deeply.   Nausea and  vomiting   You may feel sick to your stomach after surgery; if so, let your nurse know.    Pain control:  After surgery, you may have pain. Our goal is to help you manage your pain. Pain medicine will help you feel comfortable enough to do activities that will help you heal.  These activities may include breathing exercises, walking and physical therapy.   To help your health care team treat your pain we will ask: 1) If you have pain  2) where it is located 3) describe your pain in your words  Methods of pain control include medications given by mouth, vein or by nerve block for some surgeries.  We may give you a pain control pump that will:  1) Deliver the medicine through a tube placed in your vein  2) Control the amount of medicine you receive  3) Allow you to push a button to deliver a dose of pain medicine  Sequential Compression Device (SCD) or Pneumo Boots:  You may need to wear SCD S on your legs or feet. These are wraps connected to a machine that pumps in air and releases it. The repeated pumping helps prevent blood clots from forming. AFTER YOUR SURGERY  Breathing exercises   Breathing exercises help you recover faster. Take deep breaths and let the air out slowly. This will:     Help you wake up after surgery.    Help prevent complications like pneumonia.  Preventing complications will help you go home sooner.   We may give you a breathing device (incentive spirometer) to encourage you to breathe deeply.   Nausea and vomiting   You may feel sick to your stomach after surgery; if so, let your nurse know.    Pain control:  After surgery, you may have pain. Our goal is to help you manage your pain. Pain medicine will help you feel comfortable enough to do activities that will help you heal.  These activities may include breathing exercises, walking and physical therapy.   To help your health care team treat your pain we will ask: 1) If you have pain  2) where it is located 3) describe your pain in your  words  Methods of pain control include medications given by mouth, vein or by nerve block for some surgeries.  We may give you a pain control pump that will:  1) Deliver the medicine through a tube placed in your vein  2) Control the amount of medicine you receive  3) Allow you to push a button to deliver a dose of pain medicine  Sequential Compression Device (SCD) or Pneumo Boots:  You may need to wear SCD S on your legs or feet. These are wraps connected to a machine that pumps in air and releases it. The repeated pumping helps prevent blood clots from forming      Preparing for Your Surgery      Name:  Minerva Blanco   MRN:  [id number]   :  1945   Today's Date:  2017     Arriving for surgery:  Surgery date:  17  Surgery time:  0745 AM  Arrival time: 0545 AM  Please come to:       [facility] [address]  [address] [place] [address]  [id number]    -   parking is available in front of the hospital from 5:15 am to 8:00 pm    -  Stop at the Information Desk in the lobby    -   Inform the information person that you are here for surgery. An escort to 3c will be provided. If you would not like an escort, please proceed to 3C on the 3rd floor. [phone]     What can I eat or drink?  -  You may have solid food or milk products until 8 hours prior to your surgery. 11PM  ryan: STOP ANY TUBE FEEDINGS  -  You may have water, apple juice or 7up/Sprite until 2 hours prior to your surgery. 0545 AM Monday, Water/Clear Liquids in the G TUBE    Which medicines can I take?  -  Do NOT take these medications in the morning, the day of surgery:  Continue to hold Coumadin.    -  Please take these medications the day of surgery:  Scheduled meds.    How do I prepare myself?  -  Take two showers: one the night before surgery; and one the morning of surgery.         Use Scrubcare or Hibiclens to wash from neck down.  You may use your own shampoo and conditioner. No  other hair products.   -  Do NOT use lotion, powder, deodorant, or antiperspirant the day of your surgery.  -  Do NOT wear any makeup, fingernail polish or jewelry.  -  Begin using Incentive Spirometer 1 week prior to surgery.  Use 4 times per day, up to 5-10 breaths each time.  Bring Incentive Spirometer to hospital.  -Do not bring your own medications to the hospital, except for inhalers and eye drops.  -  Bring your ID and insurance card.    Questions or Concerns:  If you have questions or concerns, please call the  Preoperative Assessment Center, Monday-Friday 7AM-7PM:  [phone]

## 2017-04-12 NOTE — MR AVS SNAPSHOT
"              After Visit Summary   4/12/2017    Minerva Blanco    MRN: [id number]           Patient Information     Date Of Birth          1945        Visit Information        Provider Department      4/12/2017 12:00 PM Jens Wise MD [facility]        Today's Diagnoses     Gastroesophageal reflux disease without esophagitis    -  1    History of esophagectomy           Follow-ups after your visit        Your next 10 appointments already scheduled     May 04, 2017  2:30 PM CDT   (Arrive by 2:15 PM)   New Patient Visit with Mike Enamorado DO   [facility] ([facility])    [address] [place] [address]  4th [facility] [address] [phone]              Who to contact     If you have questions or need follow up information about today's clinic visit or your schedule please contact [facility] directly at [phone].  Normal or non-critical lab and imaging results will be communicated to you by MyChart, letter or phone within 4 business days after the clinic has received the results. If you do not hear from us within 7 days, please contact the clinic through MyChart or phone. If you have a critical or abnormal lab result, we will notify you by phone as soon as possible.  Submit refill requests through [employer] or call your pharmacy and they will forward the refill request to us. Please allow 3 business days for your refill to be completed.          Additional Information About Your Visit        [employer] Information     W[employer] lets you send messages to your doctor, view your test results, renew your prescriptions, schedule appointments and more. To sign up, go to [email or web address] . Click on \"Log in\" on the left side of the screen, which will take you to the Welcome page. Then click on \"Sign up Now\" on the right side of the page.     You will be asked to enter the access code " listed below, as well as some personal information. Please follow the directions to create your username and password.     Your access code is: [email or web address]  Expires: 2017  7:30 AM     Your access code will  in 90 days. If you need help or a new code, please call your [facility] clinic or [phone].        Care EveryWhere ID     This is your Care EveryWhere ID. This could be used by other organizations to access your [facility] medical records  [id number]        Your Vitals Were     Pulse Temperature Respirations Height Pulse Oximetry BMI (Body Mass Index)    77 98.1  F (36.7  C) (Oral) 16 1.524 m (5') 100% 17.54 kg/m2       Blood Pressure from Last 3 Encounters:   17 134/74   17 114/64   17 114/64    Weight from Last 3 Encounters:   17 47.2 kg (104 lb 1.6 oz)   17 40.7 kg (89 lb 12.8 oz)   17 40.7 kg (89 lb 12.8 oz)              Today, you had the following     No orders found for display         Today's Medication Changes          These changes are accurate as of: 17 11:59 PM.  If you have any questions, ask your nurse or doctor.               These medicines have changed or have updated prescriptions.        Dose/Directions    loperamide 1 MG/5ML liquid   Commonly known as:  IMODIUM   This may have changed:  when to take this   Used for:  Bowel habit changes        Dose:  4 mg   Take 20 mLs (4 mg) by mouth 4 times daily as needed for diarrhea   Quantity:  200 mL   Refills:  0       metoprolol 50 MG tablet   Commonly known as:  LOPRESSOR   This may have changed:    - how much to take  - how to take this  - when to take this  - additional instructions   Used for:  Essential hypertension        50 mg BID.  May crush, mix with water and administer via feeding tube   Quantity:  60 tablet   Refills:  3       OXYCODONE HCL PO   This may have changed:  Another medication with the same name was removed. Continue taking this medication, and follow the directions you see  here.   Changed by:  Casey Reyes        Dose:  5-10 mg   Take 5-10 mg by mouth every 6 hours as needed   Refills:  0       warfarin 2.5 MG tablet   Commonly known as:  COUMADIN   This may have changed:    - how much to take  - how to take this  - when to take this  - additional instructions  - Another medication with the same name was removed. Continue taking this medication, and follow the directions you see here.   Used for:  Paroxysmal atrial fibrillation (H)   Changed by:  Sandra John APRN CNP        Dose:  2.5 mg   Take 1 tablet (2.5 mg) by mouth daily   Quantity:  30 tablet   Refills:  1         Stop taking these medicines if you haven't already. Please contact your care team if you have questions.     fiber modular packet   Stopped by:  Pharmacist, [facility]           opium tincture tincture   Stopped by:  Casey Reyes                    Primary Care Provider Office Phone # Fax #    Andrea Nino [phone]       [place] [address]        Thank you!     Thank you for choosing [facility]  for your care. Our goal is always to provide you with excellent care. Hearing back from our patients is one way we can continue to improve our services. Please take a few minutes to complete the written survey that you may receive in the mail after your visit with us. Thank you!             Your Updated Medication List - Protect others around you: Learn how to safely use, store and throw away your medicines at www.disposemymeds.org.          This list is accurate as of: 4/12/17 11:59 PM.  Always use your most recent med list.                   Brand Name Dispense Instructions for use    ACETAMINOPHEN PO      Take 500-1,000 mg by mouth every 8 hours as needed for pain       BENADRYL PO      Take 25 mg by mouth nightly as needed       cholestyramine 4 G Packet    QUESTRAN     Take 1 packet by mouth daily       CULTURELLE PO       Take 1 tablet by mouth 2 times daily       doxepin 3 MG tablet    SILENOR    5 tablet    Take 1 tablet (3 mg) by mouth nightly as needed for sleep       loperamide 1 MG/5ML liquid    IMODIUM    200 mL    Take 20 mLs (4 mg) by mouth 4 times daily as needed for diarrhea       metoprolol 50 MG tablet    LOPRESSOR    60 tablet    50 mg BID.  May crush, mix with water and administer via feeding tube       multivitamins with minerals Liqd liquid      Take 15 mLs by mouth daily       nystatin Powd     1 each    Apply to peristomal skin with each pouch change until rash is resolved       OXYCODONE HCL PO      Take 5-10 mg by mouth every 6 hours as needed       traZODone 100 MG tablet    DESYREL    30 tablet    1 tablet (100 mg) by Per G Tube route At Bedtime       warfarin 2.5 MG tablet    COUMADIN    30 tablet    Take 1 tablet (2.5 mg) by mouth daily       Zinc Sulfate 220 (50 ZN) MG Tabs     30 tablet    220 mg daily.  OK to crush, mix with water and administer via feeding tube.

## 2017-04-12 NOTE — H&P
Pre-Operative H & P     CC:  Preoperative exam to assess for increased cardiopulmonary risk while undergoing surgery and anesthesia.    Date of Encounter: 4/12/2017  Primary Care Physician:  Andrea Nino Francis is a 72 year old female who presents for pre-operative H & P in preparation for laparoscopic esophagectomy on 4/17/2017 by Dr. Wise, at [facility]. She had an esophogeal perforation that resulted as a complication of her Nissen fundoplication for hiatal hernia in January of 2016.  She had a resection of the proximal stomach. She has had multiple EGDs to address infection.  Currently esophagus to bag in left chest wall. She has a PEG feeding tube and spit fistula. J-G tube was changed to G-tube. Calories have been increased. No oral intake. FV manages tube feedings-comes 2 x a week.  Diarrhea can be up to 3 uncontrolled BMs liquidy. No blood.  She has a chronic anemia and is S/p blood transfusion in early January.  Fatigue is slowly improving. She denies dizziness, lightheadedness, chest pain, syncope or SOB. She has not been very active but   Feels like things have stabilized  History is obtained from the patient.     Past Medical History  Past Medical History:   Diagnosis Date     Breast cancer (H) 2007    right side - lumpectomy, chemo, and local radiation     Deafness from Meniere's ds     left ear     Esophageal perforation 1/2016     Fibromyalgia                Hiatal hernia: surgically repaired      History of blood transfusion 1/2017     IBS (irritable bowel syndrome)      Meniere's disease     deaf left ear     MS (multiple sclerosis) (H)-diagnosed 1974 optic neuritis         Past Surgical History  Past Surgical History:   Procedure Laterality Date     APPENDECTOMY       BACK SURGERY  5/1/2015    lumbar fusion     C GASTROSTOMY/JEJUN TUBE      J tube for feedings     COMBINED ESOPHAGOSCOPY, GASTROSCOPY, DUODENOSCOPY (EGD), REMOVE  ESOPHAGEAL STENT N/A 8/26/2016    Procedure: COMBINED ESOPHAGOSCOPY, GASTROSCOPY, DUODENOSCOPY (EGD), REMOVE ESOPHAGEAL STENT;  Surgeon: Jens Wise MD;  [facility]     CREATE SPIT FISTULA N/A 1/9/2017    Procedure: CREATE SPIT FISTULA;  Surgeon: Jens Wise MD;  [facility]     ESOPHAGECTOMY N/A 1/9/2017    Procedure: ESOPHAGECTOMY;  Surgeon: Jens Wise MD;  [facility]     ESOPHAGOSCOPY, GASTROSCOPY, DUODENOSCOPY (EGD), COMBINED N/A 4/18/2016    Procedure: COMBINED ESOPHAGOSCOPY, GASTROSCOPY, DUODENOSCOPY (EGD);  Surgeon: Alexis Barraza MD;  [facility]     ESOPHAGOSCOPY, GASTROSCOPY, DUODENOSCOPY (EGD), COMBINED N/A 4/25/2016    Procedure: COMBINED ESOPHAGOSCOPY, GASTROSCOPY, DUODENOSCOPY (EGD);  Surgeon: Alexis Barraza MD;  [facility]     ESOPHAGOSCOPY, GASTROSCOPY, DUODENOSCOPY (EGD), COMBINED N/A 5/4/2016    Procedure: COMBINED ESOPHAGOSCOPY, GASTROSCOPY, DUODENOSCOPY (EGD);  Surgeon: Alexis Barraza MD;  [facility]     ESOPHAGOSCOPY, GASTROSCOPY, DUODENOSCOPY (EGD), COMBINED N/A 5/18/2016    Procedure: COMBINED ESOPHAGOSCOPY, GASTROSCOPY, DUODENOSCOPY (EGD);  Surgeon: Alexis Barraza MD;  [facility]     ESOPHAGOSCOPY, GASTROSCOPY, DUODENOSCOPY (EGD), COMBINED N/A 6/22/2016    Procedure: COMBINED ESOPHAGOSCOPY, GASTROSCOPY, DUODENOSCOPY (EGD);  Surgeon: Alexis Barraza MD;  [facility]     ESOPHAGOSCOPY, GASTROSCOPY, DUODENOSCOPY (EGD), COMBINED N/A 7/12/2016    Procedure: COMBINED ESOPHAGOSCOPY, GASTROSCOPY, DUODENOSCOPY (EGD);  Surgeon: Jens Wise MD;  [facility]     ESOPHAGOSCOPY, GASTROSCOPY, DUODENOSCOPY (EGD), DILATATION, COMBINED N/A 6/29/2016    Procedure: COMBINED ESOPHAGOSCOPY, GASTROSCOPY, DUODENOSCOPY (EGD), DILATATION;  Surgeon: Jens Wise MD;  [facility]      [facility] ENDOSCOPY W TRANSENDOSCOPIC STENT PLACEMENT N/A 7/12/2016  "   Procedure: COMBINED ESOPHAGOSCOPY, GASTROSCOPY, DUODENOSCOPY (EGD), PLACE TRANSENDOSCOPIC ESOPHAGEAL STENT;  Surgeon: Jens Wise MD;  [facility]     HC UGI ENDOSCOPY W TRANSENDOSCOPIC STENT PLACEMENT N/A 7/22/2016    Procedure: COMBINED ESOPHAGOSCOPY, GASTROSCOPY, DUODENOSCOPY (EGD), PLACE TRANSENDOSCOPIC ESOPHAGEAL STENT;  Surgeon: Jens Wise MD;  [facility]     IRRIGATION AND DEBRIDEMENT CHEST WASHOUT, COMBINED N/A 6/29/2016    Procedure: COMBINED IRRIGATION AND DEBRIDEMENT CHEST WASHOUT;  Surgeon: Jens Wise MD;  [facility]     LAPAROSCOPY DIAGNOSTIC (GENERAL) N/A 1/9/2017    Procedure: LAPAROSCOPY DIAGNOSTIC (GENERAL);  Surgeon: Jens Wise MD;  [facility]     LUMPECTOMY BREAST Right 2007     NERVE BLOCK PERIPHERAL N/A 8/30/2016    Procedure: NERVE BLOCK PERIPHERAL;  Surgeon: GENERIC ANESTHESIA PROVIDER;  Location: [facility]     NISSEN FUNDOPLICATION  1/2016     PHARYNGOSTOMY N/A 4/18/2016    Procedure: PHARYNGOSTOMY;  Surgeon: Alexis Barraza MD;  [facility]     PHARYNGOSTOMY N/A 4/25/2016    Procedure: PHARYNGOSTOMY;  Surgeon: Alexis Barraza MD;  [facility]     PHARYNGOSTOMY N/A 5/4/2016    Procedure: PHARYNGOSTOMY;  Surgeon: Alexis Barraza MD;  [facility]     PHARYNGOSTOMY N/A 6/22/2016    Procedure: PHARYNGOSTOMY;  Surgeon: Alexis Barraza MD;  [facility]     PHARYNGOSTOMY N/A 6/29/2016    Procedure: PHARYNGOSTOMY;  Surgeon: Jens Wise MD;  [facility]     PICC INSERTION Left 8/25/2016    5fr DL BioFlo PICC, 42cm (3cm external) in the L medial brachial vein w/ tip in the SVC RA junction.     THORACOTOMY Right 1998    lung infection - \"hard crust formed on lung\"     THORACOTOMY Left 1/9/2017    Procedure: THORACOTOMY;  Surgeon: Jens Wise MD;  Location[facility]     WRIST SURGERY         Hx of Blood transfusions/reactions: yes no adverse "     Hx of abnormal bleeding or anti-platelet use: on coumadin    Menstrual history: No LMP recorded. Patient is postmenopausal.:     Steroid use in the last year: no    Personal or FH with difficulty with Anesthesia:  no    Prior to Admission Medications  Current Outpatient Prescriptions   Medication Sig Dispense Refill     DiphenhydrAMINE HCl (BENADRYL PO) Take 25 mg by mouth nightly as needed       ACETAMINOPHEN PO Take 500-1,000 mg by mouth every 8 hours as needed for pain       OXYCODONE HCL PO Take 5-10 mg by mouth every 6 hours as needed       cholestyramine (QUESTRAN) 4 G Packet Take 1 packet by mouth daily       multivitamins with minerals (CERTAVITE/CEROVITE) LIQD liquid Take 15 mLs by mouth daily       doxepin (SILENOR) 3 MG tablet Take 1 tablet (3 mg) by mouth nightly as needed for sleep 5 tablet 0     [DISCONTINUED] warfarin (COUMADIN) 1 MG tablet Take 1-2 tablets daily or as directed by coumadin clinic. 60 tablet 1     nystatin POWD Apply to peristomal skin with each pouch change until rash is resolved 1 each 1     warfarin (COUMADIN) 2.5 MG tablet Take 1 tablet (2.5 mg) by mouth daily (Patient taking differently: Take 1.25 mg by mouth on Wednesday and take 2.5 mg by mouth on all other days of the week.) 30 tablet 1     metoprolol (LOPRESSOR) 50 MG tablet 50 mg BID.  May crush, mix with water and administer via feeding tube 60 tablet 3     Zinc Sulfate 220 (50 ZN) MG TABS 220 mg daily.  OK to crush, mix with water and administer via feeding tube. (Patient not taking: Reported on 4/12/2017) 30 tablet 3     traZODone (DESYREL) 100 MG tablet 1 tablet (100 mg) by Per G Tube route At Bedtime 30 tablet      loperamide (IMODIUM) 1 MG/5ML liquid Take 20 mLs (4 mg) by mouth 4 times daily as needed for diarrhea (Patient taking differently: Take 4 mg by mouth 2 times daily ) 200 mL      Lactobacillus Rhamnosus, GG, (CULTURELLE PO) Take 1 tablet by mouth 2 times daily          Allergies  Allergies   Allergen  Reactions     Amoxicillin Diarrhea     Ativan [Lorazepam] Other (See Comments)     Hallucinations     Hydromorphone Itching     Morphine Itching       Social History  Social History     Social History     Marital status:      Spouse name: richard     Number of children: 2     Years of education: N/A     Occupational History     retired       Social History Main Topics     Smoking status: Former Smoker     Packs/day: 1.00     Years: 15.00     Types: Cigarettes     Quit date: 1/1/1998     Smokeless tobacco: Not on file     Alcohol use No     Drug use: No     Sexual activity: Not on file     Other Topics Concern     Not on file     Social History Narrative    Retired realtor.  Lives with  Richard. 2 children alive and well.       Family History  Family History   Problem Relation Age of Onset     Alzheimer Disease Mother      CEREBROVASCULAR DISEASE Father      cerebral hemorrhage     Ovarian Cancer Sister      Breast Cancer Daughter      Review Of Systems  Weight loss of ~ 10 lbs past couple months-stable 89-90 currently.   Skin: periodic night sweats  Eyes: left eye neuritis was initial presentation of MS; no further focal neuro deficits  Ears/Nose/Throat: clear rhinnorhea for past few months-on claritin  Respiratory: Working with PT to strengthen core muscles; No SOB, Prior to surgery last year- > 4 METS: camping, canoeing  Cardiovascular: New onset atrial fibrillation post-op Nov 2016. Saw cardiologist and put on BB for rate and coumadin for anticoagulation.   Gastrointestinal: Currently esophagus to bag in left chest wall. J-G tube was changed to G-tube. Calories have been increased. Diarrhea can be up to 3 uncontrolled BMs liquidy. No blood  FV manages tube feedings-comes 2 x a week  Genitourinary: No UTIs, no hematuria or dysuria  Musculoskeletal: no joint swelling. Back DJD.  Osteopenia-was on calcium  Neurologic: MS diagnosed 1979 with initial episode of optic neuritis, stable  "with little progression. Less steady since lost weight. No medication treated directed to MS.  Psychiatric: fatigue and 'tired of this'-   Hematologic/Lymphatic/Immunologic: Anemia S/p blood transfusion  Endocrine: No DIABETES MELLITUS, thyroid ds.    The complete review of systems is negative other than noted in the HPI or here.   Temp: 98.1  F (36.7  C) Temp src: Oral BP: 114/64 Pulse: 77   Resp: 16 SpO2: 98 %         89 lbs 12.8 oz  5' 0\"   Body mass index is 17.54 kg/(m^2).       Physical Exam  Constitutional: Awake, alert, cooperative, no apparent distress, and appears stated age. Frail appearance, thin, fatigued  Eyes: Right pupil fixed. Left reactive to light, extra ocular muscles intact, sclera clear, conjunctiva normal.  HENT: Normocephalic, oral pharynx with moist mucus membranes, good dentition. No goiter appreciated.   Respiratory: Clear to auscultation bilaterally, no crackles or wheezing. Right lateral chest thin well healed scar  Cardiovascular: Regular rate and rhythm, normal S1 and S2, and no murmur noted.  Carotids +2, no bruits. No LE edema. Palpable pulses to radial and  DP  arteries.   GI: Bag secured to chest wall above left breast with white to pink mucous draining. Normal bowel sounds, soft, non-distended, non-tender, no masses palpated. LUQ gastrotomy bag in place, closed end. .  Surgical scars:  Multiple healed abdominal scars  Lymph/Hematologic: No cervical lymphadenopathy and no supraclavicular lymphadenopathy.  Genitourinary:  deferred  Skin: Warm and dry.    Musculoskeletal: Full ROM of neck. There is no redness, warmth, or swelling of the joints. Gross motor strength is not tested  Neurologic: Awake, alert, oriented to name, place and time. Cranial nerves II-XII are grossly intact. Gait is normal.   Neuropsychiatric: Calm, cooperative. Normal affect.     Labs: (personally reviewed)  CBC RESULTS:   Recent Labs   Lab Test  01/25/17   0727   WBC  9.4   RBC  2.80*   HGB  7.6*   HCT  25.6* " "  MCV  91   MCH  27.1   MCHC  29.7*   RDW  16.2*   PLT  814*     Last Basic Metabolic Panel:  Lab Results   Component Value Date     2017      Lab Results   Component Value Date    POTASSIUM 4.0 2017     Lab Results   Component Value Date    CHLORIDE 104 2017     Lab Results   Component Value Date    ANNABELLE 8.8 2017     Lab Results   Component Value Date    CO2 29 2017     Lab Results   Component Value Date    BUN 26 2017     Lab Results   Component Value Date    CR 0.52 2017     Lab Results   Component Value Date    GLC 83 2017       EKG: Personally reviewed but formal cardiology read pendin2017: Sinus bradycardia with possible left atrial enlargement    Cardiac echo: ECHO 2017:   Technically difficult study.  Global and regional left ventricular function is hyperdynamic with an LVEF >70%.  The right ventricular function is hyperdynamic.  Mild pulmonary hypertension is present.  The inferior vena cava is normal. The estimated mean right atrial pressure is <3 mmHg.  No pericardial effusion is present.  Previous study not available for comparison.\"    PFT's: 17 FVC: 85%; FEV1 68%    ASSESSMENT and PLAN  Minerva Blanco is a 72 year old female scheduled for a laparoscopic esophagectomy on 2017 by Dr. Wise in treatment of her esophogeal perforation that resulted as a complication of her Nissen fundoplication for hiatal hernia in 2016.      She has the following chronic medical problems:    1.) Chronic esophogeal perforation. Currently esophagus to bag in left chest wall. Feeding tube/ J-G tube was changed to G-tube. Calories have been increased. Diarrhea can be up to 3 uncontrolled BMs liquidy. No blood.  FV manages tube feedings-comes 2 x a week  -BMP, CBC, type and screen all to be done in 2 days because blood transfusion < 3 months ago so must do type and screen within 3 days of surgery.    2.) Cardiac: Paroxysmal Atrial " fibrillation, developed post-op 11/2016. CHADSVAC =2. Rate controlled with beta blocker and anticoagulated with warfarin.  Patient denies recent episodes of syncope. Functional status MET > 4  risk of major adverse cardiac event 0.9%.   -Plan to hold warfarin 7 days prior to surgery w/o bridging.   -EKG and ECHo reviewed-see above  -No further cardiac evaluation indicated    3.) Chronic anemia; S/p blood transfusion 1/2/17. HGB 7.6 1/13/17.   -Needs CBC and type and screen-future order placed-she will come in Friday 4/14/17  -I will order 2 Units of RBCs to hold for DOS    4.) Multiple sclerosis, diagnosed in 1979 with presentation of optic neuritis, no obvious progression-pt ambulatory with fair balance, normal bladder function and no focal neuro deficits.   Meniere's disease- left ear deafness      . She has the following specific operative considerations:   - RCRI : No serious cardiac risks.  0.4%  risk of major adverse cardiac event.   - Anesthesia considerations:  Refer to PAC assessment in anesthesia records  - VTE risk: age elevates to 0.5%   - CHARLIE # of risks 1/8 = low  - If afib, [id number] score 2.  Risk category intermediate.    - Risk of PONV score = 3.  If > 2, anti-emetic intervention recommended.  - On chronic opiates, morphine equivalent < 60mg/24 hr--taking 30 mg a day of oxycodone     Patient was discussed with Dr Patterson.    TOYA Turner  Preoperative Assessment Center  [facility]  Phone: [phone]  Fax: [phone]

## 2017-04-12 NOTE — ANESTHESIA PREPROCEDURE EVALUATION
Anesthesia Evaluation     . Pt has had prior anesthetic. Type: General and MAC    No history of anesthetic complications          ROS/MED HX    ENT/Pulmonary:     (+)tobacco use, Current use 1 packs/day  , . .    Neurologic:     (+)Multiple Sclerosis limitations: heat and noise sensitivity,     Cardiovascular:     (+) ----. : . . . :. . Previous cardiac testing Echodate:1/9/2017results:Technically difficult study.  Global and regional left ventricular function is hyperdynamic with an LVEF  >70%.  The right ventricular function is hyperdynamic.  Mild pulmonary hypertension is present.  The inferior vena cava is normal. The estimated mean right atrial pressure is  <3 mmHg.  No pericardial effusion is present.  Previous study not available for comparison.date: results:ECG reviewed date:2/22/2017 results:Sinus rizwan with left atrial enlargement date: results:          METS/Exercise Tolerance:  3 - Able to walk 1-2 blocks without stopping   Hematologic:     (+) Anemia, History of Transfusion no previous transfusion reaction -     (-) history of blood clots   Musculoskeletal: Comment: Fibromyalgia - generalized body aches  (+) arthritis, , , -       GI/Hepatic: Comment: History of hiatal hernia w/ associated GERD now s/p surgery.  GERD resolved    (+) Other, Inflammatory bowel disease, Other GI/Hepatic chronic esophageal perforation     GERD: asymptomatic currently, stopped protonix 3 months ago.   Renal/Genitourinary:  - ROS Renal section negative       Endo:  - neg endo ROS       Psychiatric:  - neg psychiatric ROS       Infectious Disease:  - neg infectious disease ROS       Malignancy:   (+) Malignancy History of Breast  Breast CA Remission status post Surgery, Chemo and Radiation.         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  No vision impairment now.   (+) No chance of pregnancy C-spine cleared: N/A, H/O Chronic Pain,H/O chronic opiod use , no other significant  disability                    Physical Exam  Normal systems: cardiovascular, pulmonary and dental    Airway   Mallampati: I  TM distance: >3 FB  Neck ROM: full    Dental     Cardiovascular   Rhythm and rate: regular and normal      Pulmonary    breath sounds clear to auscultation    Other findings: LABS:  Lab Results      Component                Value               Date                      WBC                      9.4                 01/25/2017            Lab Results      Component                Value               Date                      RBC                      2.80                01/25/2017            Lab Results      Component                Value               Date                      HGB                      7.6                 01/25/2017            Lab Results      Component                Value               Date                      HCT                      25.6                01/25/2017            Lab Results      Component                Value               Date                      MCV                      91                  01/25/2017            Lab Results      Component                Value               Date                      MCH                      27.1                01/25/2017            Lab Results      Component                Value               Date                      MCHC                     29.7                01/25/2017            Lab Results      Component                Value               Date                      RDW                      16.2                01/25/2017            Lab Results      Component                Value               Date                      PLT                      814                 01/25/2017            Last Basic Metabolic Panel:  Lab Results      Component                Value               Date                      NA                       140                 03/22/2017             Lab Results      Component                Value                Date                      POTASSIUM                4.0                 03/22/2017            Lab Results      Component                Value               Date                      CHLORIDE                 104                 03/22/2017            Lab Results      Component                Value               Date                      ANNABELLE                      8.8                 03/22/2017            Lab Results      Component                Value               Date                      CO2                      29                  03/22/2017            Lab Results      Component                Value               Date                      BUN                      26                  03/22/2017            Lab Results      Component                Value               Date                      CR                       0.52                03/22/2017            Lab Results      Component                Value               Date                      GLC                      83                  03/22/2017                     PAC Discussion and Assessment    ASA Classification: 3  Case is suitable for: [facility]  Anesthetic techniques and relevant risks discussed: GA  Invasive monitoring and risk discussed:   Types:   Possibility and Risk of blood transfusion discussed:   NPO instructions given:   Additional anesthetic preparation and risks discussed:   Needs early admission to pre-op area:   Other:     PAC Resident/NP Anesthesia Assessment:  Minerva Blanco is a 73 yo female with multiple sclerosis, meniere's disease, IBS, and chronic esophogeal perforation scheduled for a laparoscopic esophagectomy on 4/17/2017 by Dr. Wise in treatment of her esophogeal perforation that resulted as a complication of her Nissen fundoplication for hiatal hernia in January of 2016.  PAC referral for risk assessment and optimization for anesthesia with comorbid conditions of:    Pre-operative considerations:  1.  Cardiac:  Functional status  "MET > 4  risk of major adverse cardiac event 0.9%.      -Paroxysmal Atrial fibrillation: Patient currently on warfarin and metoprolol.  Patient denies recent episodes of syncope. Plan to hold warfarin 7 days prior to surgery w/o bridging.    -EKG 2/22/2017: Sinus bradycardia with possible left atrial enlargement    -ECHO 1/9/2017:   \"Technically difficult study.  Global and regional left ventricular function is hyperdynamic with an LVEF >70%.  The right ventricular function is hyperdynamic.  Mild pulmonary hypertension is present.  The inferior vena cava is normal. The estimated mean right atrial pressure is <3 mmHg.  No pericardial effusion is present.  Previous study not available for comparison.\"    2.  Pulm:  Airway feasible.  CHARLIE risk: Low risk    3.  GI:  Risk of PONV score = 2.  If > 2, anti-emetic intervention recommended.  -Hiatal Hernia s/p Nissen Fundoplication in January of 2016 complicated by chronic esophageal perforation.  Patient with PEG, and spit fistula.      VTE risk: 0.5%    Patient is optimized and is acceptable candidate for the proposed procedure.  No further diagnostic evaluation is needed.     Patient also evaluated by Dr. Jenkins. See recommendations below.     Arpan Patterson Jr., MD      Reviewed and Signed by PAC Mid-Level Provider/Resident  Mid-Level Provider/Resident: Arpan Patterson Jr., MD  Date: 4/12/2017  Time: 1111    Attending Anesthesiologist Anesthesia Assessment:  72 year old for lap esophagectomy in management of chronic esophageal perforation due to Nissen fundoplication. Chart reviewed, patient seen and evaluated; agree with above assessment.  No significant cardiac disease except paroxysmal AF, anticoagulated. .     Patient is appropriate for the planned procedure without further workup or medical management change. The final anesthesia plan will be determined by the physician anesthesiologist caring for the patient on the day of surgery.      Reviewed and Signed by PAC " Anesthesiologist  Anesthesiologist: zion  Date: 4/12/2017  Time:   Pass/Fail: Pass  Disposition:     PAC Pharmacist Assessment:  PREOPERATIVE PAIN CONSULT FOR POSTOPERATIVE PAIN MANAGEMENT  Minerva Blanco was seen and interviewed during time of PAC Clinic appointment April 12, 2017. The following recommendations ONLY apply for the planned surgical procedure with Dr Wise on 4/17/17 at the [facility] for Radical neck dissection, spit fistulae takedown, lap J tube, pharyngostomy tube, gastric pull up, upper endoscopy and flex bronch. These postoperative recommendations are intended for patients admitted to the hospital after surgery. These recommendations are only valid for 30 days from the date of service. If there are significant changes in opioid dosing between today and day of surgery the below recommendations may have to be adjusted.      Based on patient interview:      - OUTPATIENT MEDICATIONS (related to pain management):  -- Long-acting opioid: none  -- Short-acting opioid: oxycodone 5-10 mg (usually uses 10 mg dose) every 6 hr PRN pain (takes avg of 30-40 mg/day)  -- Oral adjuvant(s): acetaminophen 1000 mg PO PRN (usu 1x/day)   -- Topicals: none  -- Bowel Regimen: none, pt reports diarrhea related to TF.       ASSESSMENT:   Minerva Blanco has a history of difficult to control postoperative pain with her procedure in January 2017 who remains on oxycodone PRN for pain that is now primarily in her mid back. She has a history of abdominal/thoracic pain that is secondary to esophageal perforation that has improved since last surgery. She reports continuous use of opioid pain medications, but is actively trying to wean down on medications. Other PMH significant for fibromyalgia, MS, IBS, GERD, history of breast cancer. Outpatient opioids prescribed by Dr. Wise, but patient is scheduled to [facility]riquez in [facility] on [facility]. She is optimistic  to eventually wean to off on her opioid pain medicaitons. She reports good pain relief with the epidural she received back in January and was wondering if this would be a possibility for the upcoming procedure. She has been able to taper down on her opioids to a dose that is less than what she was on leading into her January surgery and I am optimistic this will help with her postoperative pain control. Would recommend follow up with inpatient pain service to facilitate handoff to Dr. Enriquez in the [facility].       Outpatient opioid oral morphine equivalent (OME): 45-60 OME/day  Patient has received hydromorphone PCA in the past without complication.      Pain medications tried in the past:   Morphine- itching  Hydromorphone - previously caused some itching, but patient tolerated well during last hospitalization and open to using this again for postop pain management.   Gabapentin - worked well for postop pain, tapered off as an outpatient once postop pain had improved. Willing to try again for postop pain management.      RECOMMENDATIONS:   The following pain management recommendations are made based on information from today's visit and should not replace medical decision-making based on patient condition at the time of surgery or postoperatively.      - PREOPERATIVE:-- Continue short acting opioid - oxycodone 5-10 mg PO q6 hr PRN   -- Continue adjuvants/nonopioid analgesics: Acetaminophen.      -- Before surgery recommend gabapentin 300 mg PO x 1 dose in pre-op area (will be written by Dr. Jenkins preoperatively).   -- Before surgery consider acetaminophen 1000 mg PO in pre-op (will be written by Dr. Jenkins preoperatively)   -- Before surgery consider celecoxib 200 mg PO in pre-op (defer to Dr. Wise's team, if elected to proceed with this will need to)     - INTRAOPERATIVE (Anesthesiologist/CRNA to consider):   -- Regional anesthesia: consider epidural vs TAP block - defer to  surgery/RAPS team. Patient reports she did well with the epidural with her last procedure.   -- Ketamine IV intraoperatively  -- Dexmedetomidine IV intraoperatively  -- Avoid remifentanil as able - to reduce risk of developing hyperalgesia     - POSTOPERATIVE MANAGEMENT:  Place pain management consult to assist with acute postoperative pain management.   Opioid analgesic:   -- HYDROmorphone (Dilaudid) PCA doses 0.2-0.4 mg Q 10 min lockout interval with NO CONTINUOUS RATE (patients outpatient Oral morphine equivalent is <60 mg OME/day). If patient is showing s/sx sedation or respiratory depression reduce PCA doses as necessary.    -- if itching occurs could consider adding low dose diphenhydramine or hydroxyzine  -- Hold other PO and IV opioids while on PCA.     Nonopioid analgesics:   -- defer use of NSAID to surgeon  -- acetaminophen 650 mg PO every 6 hr scheduled   -- gabapentin 100 mg PO every 8 hours scheduled, will consider further increases in while inpatient.   -- Continue doxepin 3 mg PO qHS PRN if patient does well with this preoperatively.      Muscle Relaxant:   --methocarbamol 250-500 mg PO Q6 hr PRN muscle spasm OR  --If unable to tolerate PO meds, use Diazepam (Valium) 2.5 mg IV Q 6 hr PRN muscle spasm     Stool softeners/Laxatives:   -- Defer bowel regimen to primary team     Other:  -- Recommend close monitoring of respiratory status postoperatively with capnography and/or SpO2 monitoring.         The inpatient pain service will follow up on patient after consult is placed and offer further recommendations for pain management. If immediate assistance is needed please contact the pain service at the number below.      Juanjose Nicolas [facility]  April 12, 2017  9:33 AM     If questions or concerns, please contact the Inpatient Pain Management Service:  Call [phone] after hours, weekends and holidays.   Page [phone] from 8 AM - 3 PM Mon - Fri.    Reviewed and Signed by PAC Pharmacist  Pharmacist:  Juanjose [facility]  Date: 4/12/17  Time:1439      Anesthesia Plan      History & Physical Review  History and physical reviewed and following examination; no interval change.    ASA Status:  3 .    NPO Status:  > 8 hours    Plan for General and ETT with Intravenous and Propofol induction. Maintenance will be Balanced.    PONV prophylaxis:  Ondansetron (or other 5HT-3) and Dexamethasone or Solumedrol  Additional equipment: 2nd IV, Arterial Line and CVP GETA 7.0 ETT  Balanced maintenance   2 PIV  A-line  2 Units blood available with valid T&S      Robert Mayes MD     Given chronic opioid use, considered regional and multimodal anesthesia.  Per Dr. Wise request, no regional block pursued.  Discussed the potential for post-operative block in PACU, which Dr. Wise is open to.    I discussed the risks and benefits of general anesthesia, CVC, Cecy with the patient.  Questions were sought and answered.      Nelson Navarro MD  Attending Anesthesiologist        Postoperative Care  Postoperative pain management:  IV analgesics and Multi-modal analgesia.  Plan for postoperative opioid use.    Consents  Anesthetic plan, risks, benefits and alternatives discussed with:  Patient.  Use of blood products discussed: Yes.   Use of blood products discussed with Patient.  Consented to blood products.  .

## 2017-04-12 NOTE — LETTER
4/12/2017      RE: Minerva Blanco  [id number]  [place] [address]       THORACIC SURGERY FOLLOW UP VISIT    Dear Dr. Carroll,  I saw Mrs. Minerva Blanco in follow-up today. The clinical summary follows:      PREOP DIAGNOSIS   1.  Chronic esophageal perforation with mediastinal phlegmon.    2.  Status post fundoplication.        PROCEDURE   1/9/2017  1.  Esophagogastroscopy.    2.  Laparoscopic lysis of adhesions.    3.  Laparoscopic proximal gastrectomy and gastrostomy tube placement.    4.  Left thoracotomy with excision of mediastinal phlegmon and distal esophagectomy.    5.  Thoracic duct ligation.    6.  Right tube thoracostomy.    7.  Left esophageal spit fistula.    8.  Bronchoscopy.        PRIOR PROCEDURES  1) Multiple endoscopies and pharyngostomy tube placement x 2 (for drainage of paraesophageal cavity)  2) Esophageal stent placement, attempted placement of biliary stent into the paraesophageal cavity (7/22/2016)  3) Esophageal stent removal, placement of retrograde gastroesophageal tube (10/23/2016)  4) Toupet fundoplication (1/2016)          COMPLICATIONS  Thoracotomy wound infection requiring antibiotics     INTERVAL STUDIES  None      TOB never  ETOH negative      SUBJECTIVE  She is continuing to require oxycodone, but she is emotionally and physically ready for surgery      From a personal perspective, she is here with her  Enrique.        IMPRESSION   (Z90.49) H/O esophagectomy  (K21.9) Gastroesophageal reflux disease without esophagitis     72 year-old female S/P esophagectomy and spit fistula for end-stage GERD      PLAN  I spent a total of 40 minutes with Ms. Minerva Blanco and Enrique, more than 50% of which were spent in counseling, coordination of care, and face-to-face time. I reviewed the plan as follows:  1) Procedure planned: retrosternal gastric pull-up. I reviewed the indications, risks, and benefits of the procedure with Mrs. Minerva Blanco and her  Enrique robb. We discussed the intraoperative risks of bleeding, injury to vital organs, and potential for esophageal discontinuity.  Potential postoperative complications include, but are not limited to, major respiratory events, arrhythmia, bleeding, infection, reoperation, anastomotic leak, conduit necrosis with discontinuity, vocal cord palsy, and death. I explained the anticipated hospital course (10-14 days) and postoperative recovery including pain control, tube feedings, dietary changes, and overall drain management. We discussed the importance of lifetime awareness to limit lifting heavy weights to prevent hiatal herniation as well as the need for aspiration precautions.    Necessary Tests & Appointments: PAC  Pain Control Plan: Pain consult  Anticoagulation Plan: Hold coumadin x7d preop with no bridge as per her anticoagulation clinic recommendations.    They had all their questions answered and were in agreement with the plan.  I appreciate the opportunity to participate in the care of your patient and will keep you updated.  Sincerely,    Jens Wise MD

## 2017-04-12 NOTE — MR AVS SNAPSHOT
After Visit Summary   4/12/2017    Minerva Blanco    MRN: [id number]           Patient Information     Date Of Birth          1945        Visit Information        Provider Department      4/12/2017 10:30 AM Rn, Casey JIMENEZ [facility]        Care Instructions      Using an Incentive Spirometer: 5x's/day and 5x's each time.  Soon after your surgery, a nurse or therapist will teach you breathing exercises. These keep your lungs clear, strengthen your breathing muscles, and help prevent complications.  The exercises include doing a deep-breathing exercise using a device called an incentive spirometer.  To do these exercises, you will breathe in through your mouth and not your nose. The incentive spirometer only works correctly if you breathe in through your mouth.  Four steps to clear lungs     Deep breathing expands the lungs, aids circulation, and helps prevent pneumonia.   1. Exhale normally.    Relax and breathe out.  2. Place your lips tightly around the mouthpiece.    Make sure the device is upright and not tilted.  3. Inhale as much air as you can through the mouthpiece (don't breath through your nose).    Inhale slowly and deeply.    Hold your breath long enough to keep the balls or disk raised for at least 3 seconds.    If you re inhaling too quickly, your device may make a tone. If you hear this tone, inhale more slowly.  4. Repeat the exercise regularly.    Do this exercise every hour while you're awake, or as your health care provider instructs.    You will also be taught coughing exercises and be asked to do them regularly on your own.    4670-6341 [employer]. [address] [place] [address], [place] [address]. All rights reserved. This information is not intended as a substitute for professional medical care. Always follow your healthcare professional's instructions.    AFTER YOUR SURGERY  Breathing exercises   Breathing exercises help you recover faster.  Take deep breaths and let the air out slowly. This will:     Help you wake up after surgery.    Help prevent complications like pneumonia.  Preventing complications will help you go home sooner.   We may give you a breathing device (incentive spirometer) to encourage you to breathe deeply.   Nausea and vomiting   You may feel sick to your stomach after surgery; if so, let your nurse know.    Pain control:  After surgery, you may have pain. Our goal is to help you manage your pain. Pain medicine will help you feel comfortable enough to do activities that will help you heal.  These activities may include breathing exercises, walking and physical therapy.   To help your health care team treat your pain we will ask: 1) If you have pain  2) where it is located 3) describe your pain in your words  Methods of pain control include medications given by mouth, vein or by nerve block for some surgeries.  We may give you a pain control pump that will:  1) Deliver the medicine through a tube placed in your vein  2) Control the amount of medicine you receive  3) Allow you to push a button to deliver a dose of pain medicine  Sequential Compression Device (SCD) or Pneumo Boots:  You may need to wear SCD S on your legs or feet. These are wraps connected to a machine that pumps in air and releases it. The repeated pumping helps prevent blood clots from forming. AFTER YOUR SURGERY  Breathing exercises   Breathing exercises help you recover faster. Take deep breaths and let the air out slowly. This will:     Help you wake up after surgery.    Help prevent complications like pneumonia.  Preventing complications will help you go home sooner.   We may give you a breathing device (incentive spirometer) to encourage you to breathe deeply.   Nausea and vomiting   You may feel sick to your stomach after surgery; if so, let your nurse know.    Pain control:  After surgery, you may have pain. Our goal is to help you manage your pain. Pain  medicine will help you feel comfortable enough to do activities that will help you heal.  These activities may include breathing exercises, walking and physical therapy.   To help your health care team treat your pain we will ask: 1) If you have pain  2) where it is located 3) describe your pain in your words  Methods of pain control include medications given by mouth, vein or by nerve block for some surgeries.  We may give you a pain control pump that will:  1) Deliver the medicine through a tube placed in your vein  2) Control the amount of medicine you receive  3) Allow you to push a button to deliver a dose of pain medicine  Sequential Compression Device (SCD) or Pneumo Boots:  You may need to wear SCD S on your legs or feet. These are wraps connected to a machine that pumps in air and releases it. The repeated pumping helps prevent blood clots from forming      Preparing for Your Surgery      Name:  Minerva Blanco   MRN:  [id number]   :  1945   Today's Date:  2017     Arriving for surgery:  Surgery date:  17  Surgery time:  0745 AM  Arrival time: 0545 AM  Please come to:       [facility] [address] [place] [address]    -   parking is available in front of the hospital from 5:15 am to 8:00 pm    -  Stop at the Information Desk in the lobby    -   Inform the information person that you are here for surgery. An escort to  will be provided. If you would not like an escort, please proceed to 3C on the 3rd floor. [phone]     What can I eat or drink?  -  You may have solid food or milk products until 8 hours prior to your surgery. 11PM  ryan: STOP ANY TUBE FEEDINGS  -  You may have water, apple juice or 7up/Sprite until 2 hours prior to your surgery. 0545 AM Monday, Water/Clear Liquids in the G TUBE>    Which medicines can I take?  -  Do NOT take these medications in the morning, the day of surgery:  Continue to hold  Coumadin.    -  Please take these medications the day of surgery:  Scheduled meds.    How do I prepare myself?  -  Take two showers: one the night before surgery; and one the morning of surgery.         Use Scrubcare or Hibiclens to wash from neck down.  You may use your own shampoo and conditioner. No other hair products.   -  Do NOT use lotion, powder, deodorant, or antiperspirant the day of your surgery.  -  Do NOT wear any makeup, fingernail polish or jewelry.  -  Begin using Incentive Spirometer 1 week prior to surgery.  Use 4 times per day, up to 5-10 breaths each time.  Bring Incentive Spirometer to hospital.  -Do not bring your own medications to the hospital, except for inhalers and eye drops.  -  Bring your ID and insurance card.    Questions or Concerns:  If you have questions or concerns, please call the  Preoperative Assessment Center, Monday-Friday 7AM-7PM:  [phone]                        Follow-ups after your visit        Your next 10 appointments already scheduled     Apr 12, 2017 11:15 AM CDT   LAB with  LAB   [facility] ([facility])    [address] [place] [address] [phone]           Patient must bring picture ID.  Patient should be prepared to give a urine specimen  Please do not eat 10-12 hours before your appointment if you are coming in fasting for labs on lipids, cholesterol, or glucose (sugar).  Pregnant women should follow their Care Team instructions. Water with medications is okay. Do not drink coffee or other fluids.   If you have concerns about taking  your medications, please ask at office or if scheduling via Needly, send a message by clicking on Secure Messaging, Message Your Care Team.            Apr 12, 2017 12:00 PM CDT   (Arrive by 11:45 AM)   Return Visit with Jens Wise MD   [facility] ([facility])    [address] [place] [address]  [address]   [phone]            2017   Procedure with Jens Wise MD   [facility] [place], Same Day Surgery (--)    [address]  [place] [address] [phone]            May 04, 2017  2:30 PM CDT   (Arrive by 2:15 PM)   New Patient Visit with Nilson Enriquez MD   [facility] ([email or web address] and [facility] [place])    [address]  4th Floor  [facility] [address] [phone]              Future tests that were ordered for you today     Open Future Orders        Priority Expected Expires Ordered    Basic metabolic panel Routine 2017    CBC with platelets Routine 2017    ABO/Rh type and screen Routine 2017            Who to contact     Please call your clinic at [phone] to:    Ask questions about your health    Make or cancel appointments    Discuss your medicines    Learn about your test results    Speak to your doctor   If you have compliments or concerns about an experience at your clinic, or if you wish to file a complaint, please contact [facility] Patient Relations at [phone] or email us at [email or web address]         Additional Information About Your Visit        [employer] Information     [employer] is an electronic gateway that provides easy, online access to your medical records. With [employer], you can request a clinic appointment, read your test results, renew a prescription or communicate with your care team.     To sign up for [employer] visit the website at [email or web address]   You will be asked to enter the access code listed below, as well as some personal information. Please follow the directions to create your username and password.     Your access code is: [email or web address]  Expires: 2017  7:30 AM     Your access code will  in 90 days. If you need help or a new code, please contact your  [facility] or call [phone] for assistance.        Care EveryWhere ID     This is your Care EveryWhere ID. This could be used by other organizations to access your [place] medical records  [id number]         Blood Pressure from Last 3 Encounters:   04/12/17 114/64   02/22/17 117/61   02/22/17 117/61    Weight from Last 3 Encounters:   04/12/17 40.7 kg (89 lb 12.8 oz)   02/22/17 44.5 kg (98 lb)   02/22/17 44.7 kg (98 lb 9.6 oz)              Today, you had the following     No orders found for display         Today's Medication Changes          These changes are accurate as of: 4/12/17 10:53 AM.  If you have any questions, ask your nurse or doctor.               These medicines have changed or have updated prescriptions.        Dose/Directions    loperamide 1 MG/5ML liquid   Commonly known as:  IMODIUM   This may have changed:  when to take this   Used for:  Bowel habit changes        Dose:  4 mg   Take 20 mLs (4 mg) by mouth 4 times daily as needed for diarrhea   Quantity:  200 mL   Refills:  0       metoprolol 50 MG tablet   Commonly known as:  LOPRESSOR   This may have changed:    - how much to take  - how to take this  - when to take this  - additional instructions   Used for:  Essential hypertension        50 mg BID.  May crush, mix with water and administer via feeding tube   Quantity:  60 tablet   Refills:  3       OXYCODONE HCL PO   This may have changed:  Another medication with the same name was removed. Continue taking this medication, and follow the directions you see here.   Changed by:  Pharmacist, Casey Pac        Dose:  5-10 mg   Take 5-10 mg by mouth every 6 hours as needed   Refills:  0       warfarin 2.5 MG tablet   Commonly known as:  COUMADIN   This may have changed:    - how much to take  - how to take this  - when to take this  - additional instructions  - Another medication with the same name was removed. Continue taking this medication, and follow the  directions you see here.   Used for:  Paroxysmal atrial fibrillation (H)   Changed by:  Sandra John APRN CNP        Dose:  2.5 mg   Take 1 tablet (2.5 mg) by mouth daily   Quantity:  30 tablet   Refills:  1         Stop taking these medicines if you haven't already. Please contact your care team if you have questions.     fiber modular packet   Stopped by:  Pharmacist,  Pac           opium tincture tincture   Stopped by:  Pharmacist,  Shakeel                    Primary Care Provider Office Phone # Fax #    Andrea Nino [phone]       [facility] [address] [address]        Thank you!     Thank you for choosing [facility]  for your care. Our goal is always to provide you with excellent care. Hearing back from our patients is one way we can continue to improve our services. Please take a few minutes to complete the written survey that you may receive in the mail after your visit with us. Thank you!             Your Updated Medication List - Protect others around you: Learn how to safely use, store and throw away your medicines at www.disposemymeds.org.          This list is accurate as of: 4/12/17 10:53 AM.  Always use your most recent med list.                   Brand Name Dispense Instructions for use    ACETAMINOPHEN PO      Take 500-1,000 mg by mouth every 8 hours as needed for pain       BENADRYL PO      Take 25 mg by mouth nightly as needed       cholestyramine 4 G Packet    QUESTRAN     Take 1 packet by mouth daily       CULTURELLE PO      Take 1 tablet by mouth 2 times daily       doxepin 3 MG tablet    SILENOR    5 tablet    Take 1 tablet (3 mg) by mouth nightly as needed for sleep       loperamide 1 MG/5ML liquid    IMODIUM    200 mL    Take 20 mLs (4 mg) by mouth 4 times daily as needed for diarrhea       metoprolol 50 MG tablet    LOPRESSOR    60 tablet    50 mg BID.  May crush, mix with water and administer via feeding  tube       multivitamins with minerals Liqd liquid      Take 15 mLs by mouth daily       nystatin Powd     1 each    Apply to peristomal skin with each pouch change until rash is resolved       OXYCODONE HCL PO      Take 5-10 mg by mouth every 6 hours as needed       traZODone 100 MG tablet    DESYREL    30 tablet    1 tablet (100 mg) by Per G Tube route At Bedtime       warfarin 2.5 MG tablet    COUMADIN    30 tablet    Take 1 tablet (2.5 mg) by mouth daily       Zinc Sulfate 220 (50 ZN) MG Tabs     30 tablet    220 mg daily.  OK to crush, mix with water and administer via feeding tube.

## 2017-04-14 DIAGNOSIS — I48.0 PAROXYSMAL ATRIAL FIBRILLATION (H): ICD-10-CM

## 2017-04-14 DIAGNOSIS — Z01.818 PRE-OPERATIVE GENERAL PHYSICAL EXAMINATION: ICD-10-CM

## 2017-04-14 DIAGNOSIS — D64.9 ANEMIA, UNSPECIFIED TYPE: ICD-10-CM

## 2017-04-14 LAB
ANION GAP SERPL CALCULATED.3IONS-SCNC: 7 MMOL/L (ref 3–14)
BUN SERPL-MCNC: 33 MG/DL (ref 7–30)
CALCIUM SERPL-MCNC: 9 MG/DL (ref 8.5–10.1)
CHLORIDE SERPL-SCNC: 100 MMOL/L (ref 94–109)
CO2 SERPL-SCNC: 32 MMOL/L (ref 20–32)
CREAT SERPL-MCNC: 0.59 MG/DL (ref 0.52–1.04)
ERYTHROCYTE [DISTWIDTH] IN BLOOD BY AUTOMATED COUNT: 16.4 % (ref 10–15)
GFR SERPL CREATININE-BSD FRML MDRD: ABNORMAL ML/MIN/1.7M2
GLUCOSE SERPL-MCNC: 84 MG/DL (ref 70–99)
HCT VFR BLD AUTO: 39.7 % (ref 35–47)
HGB BLD-MCNC: 11.9 G/DL (ref 11.7–15.7)
INR PPP: 1.07 (ref 0.86–1.14)
MCH RBC QN AUTO: 26.5 PG (ref 26.5–33)
MCHC RBC AUTO-ENTMCNC: 30 G/DL (ref 31.5–36.5)
MCV RBC AUTO: 88 FL (ref 78–100)
PLATELET # BLD AUTO: 313 10E9/L (ref 150–450)
POTASSIUM SERPL-SCNC: 4 MMOL/L (ref 3.4–5.3)
RBC # BLD AUTO: 4.49 10E12/L (ref 3.8–5.2)
SODIUM SERPL-SCNC: 138 MMOL/L (ref 133–144)
WBC # BLD AUTO: 7.9 10E9/L (ref 4–11)

## 2017-04-16 RX ORDER — LIDOCAINE 40 MG/G
CREAM TOPICAL
Status: CANCELLED | OUTPATIENT
Start: 2017-04-16

## 2017-04-16 RX ORDER — NALOXONE HYDROCHLORIDE 0.4 MG/ML
.1-.4 INJECTION, SOLUTION INTRAMUSCULAR; INTRAVENOUS; SUBCUTANEOUS
Status: CANCELLED | OUTPATIENT
Start: 2017-04-16

## 2017-04-16 RX ORDER — SODIUM CHLORIDE, SODIUM LACTATE, POTASSIUM CHLORIDE, CALCIUM CHLORIDE 600; 310; 30; 20 MG/100ML; MG/100ML; MG/100ML; MG/100ML
1000 INJECTION, SOLUTION INTRAVENOUS CONTINUOUS
Status: CANCELLED | OUTPATIENT
Start: 2017-04-16

## 2017-04-17 ENCOUNTER — HOSPITAL ENCOUNTER (INPATIENT)
Facility: CLINIC | Age: 72
LOS: 11 days | Discharge: HOME HEALTH | DRG: 326 | End: 2017-04-28
Attending: THORACIC SURGERY (CARDIOTHORACIC VASCULAR SURGERY) | Admitting: SURGERY
Payer: MEDICARE

## 2017-04-17 ENCOUNTER — ANESTHESIA (OUTPATIENT)
Dept: SURGERY | Facility: CLINIC | Age: 72
DRG: 326 | End: 2017-04-17
Payer: MEDICARE

## 2017-04-17 ENCOUNTER — APPOINTMENT (OUTPATIENT)
Dept: GENERAL RADIOLOGY | Facility: CLINIC | Age: 72
DRG: 326 | End: 2017-04-17
Attending: ANESTHESIOLOGY
Payer: MEDICARE

## 2017-04-17 DIAGNOSIS — R19.4 BOWEL HABIT CHANGES: ICD-10-CM

## 2017-04-17 DIAGNOSIS — Z90.49 HISTORY OF ESOPHAGECTOMY: ICD-10-CM

## 2017-04-17 DIAGNOSIS — G47.00 INSOMNIA, UNSPECIFIED TYPE: ICD-10-CM

## 2017-04-17 DIAGNOSIS — Z79.01 ON WARFARIN THERAPY: ICD-10-CM

## 2017-04-17 DIAGNOSIS — Z98.890 HX OF ESOPHAGECTOMY: Primary | ICD-10-CM

## 2017-04-17 DIAGNOSIS — K22.3 ESOPHAGEAL PERFORATION: ICD-10-CM

## 2017-04-17 DIAGNOSIS — Z90.49 HX OF ESOPHAGECTOMY: Primary | ICD-10-CM

## 2017-04-17 DIAGNOSIS — G89.18 ACUTE POST-OPERATIVE PAIN: ICD-10-CM

## 2017-04-17 DIAGNOSIS — R10.9 ABDOMINAL CRAMPING: ICD-10-CM

## 2017-04-17 DIAGNOSIS — I48.0 PAROXYSMAL ATRIAL FIBRILLATION (H): ICD-10-CM

## 2017-04-17 DIAGNOSIS — Z98.890 HISTORY OF ESOPHAGECTOMY: ICD-10-CM

## 2017-04-17 LAB
ABO + RH BLD: NORMAL
ABO + RH BLD: NORMAL
ALBUMIN SERPL-MCNC: 3.6 G/DL (ref 3.4–5)
ALP SERPL-CCNC: 283 U/L (ref 40–150)
ALT SERPL W P-5'-P-CCNC: 56 U/L (ref 0–50)
ANION GAP SERPL CALCULATED.3IONS-SCNC: 6 MMOL/L (ref 3–14)
ANION GAP SERPL CALCULATED.3IONS-SCNC: 8 MMOL/L (ref 3–14)
AST SERPL W P-5'-P-CCNC: 38 U/L (ref 0–45)
BASE DEFICIT BLDA-SCNC: 0.1 MMOL/L
BASE EXCESS BLDA CALC-SCNC: 0.2 MMOL/L
BILIRUB SERPL-MCNC: 0.8 MG/DL (ref 0.2–1.3)
BLD GP AB SCN SERPL QL: NORMAL
BLD PROD TYP BPU: NORMAL
BLD UNIT ID BPU: 0
BLD UNIT ID BPU: 0
BLOOD BANK CMNT PATIENT-IMP: NORMAL
BLOOD PRODUCT CODE: NORMAL
BLOOD PRODUCT CODE: NORMAL
BPU ID: NORMAL
BPU ID: NORMAL
BUN SERPL-MCNC: 15 MG/DL (ref 7–30)
BUN SERPL-MCNC: 16 MG/DL (ref 7–30)
CA-I BLD-MCNC: 4.6 MG/DL (ref 4.4–5.2)
CA-I BLD-MCNC: 4.7 MG/DL (ref 4.4–5.2)
CALCIUM SERPL-MCNC: 7.7 MG/DL (ref 8.5–10.1)
CALCIUM SERPL-MCNC: 9.4 MG/DL (ref 8.5–10.1)
CHLORIDE SERPL-SCNC: 107 MMOL/L (ref 94–109)
CHLORIDE SERPL-SCNC: 97 MMOL/L (ref 94–109)
CO2 SERPL-SCNC: 23 MMOL/L (ref 20–32)
CO2 SERPL-SCNC: 31 MMOL/L (ref 20–32)
CREAT SERPL-MCNC: 0.42 MG/DL (ref 0.52–1.04)
CREAT SERPL-MCNC: 0.52 MG/DL (ref 0.52–1.04)
ERYTHROCYTE [DISTWIDTH] IN BLOOD BY AUTOMATED COUNT: 16.3 % (ref 10–15)
GFR SERPL CREATININE-BSD FRML MDRD: ABNORMAL ML/MIN/1.7M2
GFR SERPL CREATININE-BSD FRML MDRD: ABNORMAL ML/MIN/1.7M2
GLUCOSE BLD-MCNC: 149 MG/DL (ref 70–99)
GLUCOSE BLD-MCNC: 150 MG/DL (ref 70–99)
GLUCOSE SERPL-MCNC: 161 MG/DL (ref 70–99)
GLUCOSE SERPL-MCNC: 74 MG/DL (ref 70–99)
HCO3 BLD-SCNC: 25 MMOL/L (ref 21–28)
HCO3 BLD-SCNC: 26 MMOL/L (ref 21–28)
HCT VFR BLD AUTO: 30.2 % (ref 35–47)
HGB BLD-MCNC: 11.2 G/DL (ref 11.7–15.7)
HGB BLD-MCNC: 9 G/DL (ref 11.7–15.7)
HGB BLD-MCNC: 9.1 G/DL (ref 11.7–15.7)
HGB BLD-MCNC: 9.5 G/DL (ref 11.7–15.7)
INR PPP: 1.04 (ref 0.86–1.14)
INR PPP: 1.15 (ref 0.86–1.14)
MAGNESIUM SERPL-MCNC: 1.6 MG/DL (ref 1.6–2.3)
MCH RBC QN AUTO: 25.8 PG (ref 26.5–33)
MCHC RBC AUTO-ENTMCNC: 29.8 G/DL (ref 31.5–36.5)
MCV RBC AUTO: 87 FL (ref 78–100)
MRSA DNA SPEC QL NAA+PROBE: NORMAL
NUM BPU REQUESTED: 2
O2/TOTAL GAS SETTING VFR VENT: 42 %
O2/TOTAL GAS SETTING VFR VENT: 57 %
PCO2 BLD: 43 MM HG (ref 35–45)
PCO2 BLD: 46 MM HG (ref 35–45)
PH BLD: 7.36 PH (ref 7.35–7.45)
PH BLD: 7.37 PH (ref 7.35–7.45)
PHOSPHATE SERPL-MCNC: 4 MG/DL (ref 2.5–4.5)
PLATELET # BLD AUTO: 312 10E9/L (ref 150–450)
PO2 BLD: 192 MM HG (ref 80–105)
PO2 BLD: 246 MM HG (ref 80–105)
POTASSIUM BLD-SCNC: 2.9 MMOL/L (ref 3.4–5.3)
POTASSIUM BLD-SCNC: 3.1 MMOL/L (ref 3.4–5.3)
POTASSIUM SERPL-SCNC: 3.4 MMOL/L (ref 3.4–5.3)
POTASSIUM SERPL-SCNC: 3.6 MMOL/L (ref 3.4–5.3)
PROT SERPL-MCNC: 8.6 G/DL (ref 6.8–8.8)
RBC # BLD AUTO: 3.49 10E12/L (ref 3.8–5.2)
SODIUM BLD-SCNC: 136 MMOL/L (ref 133–144)
SODIUM BLD-SCNC: 136 MMOL/L (ref 133–144)
SODIUM SERPL-SCNC: 133 MMOL/L (ref 133–144)
SODIUM SERPL-SCNC: 138 MMOL/L (ref 133–144)
SPECIMEN EXP DATE BLD: NORMAL
SPECIMEN SOURCE: NORMAL
TRANSFUSION STATUS PATIENT QL: NORMAL
WBC # BLD AUTO: 10.5 10E9/L (ref 4–11)

## 2017-04-17 PROCEDURE — 25000128 H RX IP 250 OP 636: Performed by: NURSE ANESTHETIST, CERTIFIED REGISTERED

## 2017-04-17 PROCEDURE — 0DH63UZ INSERTION OF FEEDING DEVICE INTO STOMACH, PERCUTANEOUS APPROACH: ICD-10-PCS | Performed by: STUDENT IN AN ORGANIZED HEALTH CARE EDUCATION/TRAINING PROGRAM

## 2017-04-17 PROCEDURE — 25000125 ZZHC RX 250: Performed by: ANESTHESIOLOGY

## 2017-04-17 PROCEDURE — 25000128 H RX IP 250 OP 636: Performed by: CLINICAL NURSE SPECIALIST

## 2017-04-17 PROCEDURE — 40000196 ZZH STATISTIC RAPCV CVP MONITORING

## 2017-04-17 PROCEDURE — A9270 NON-COVERED ITEM OR SERVICE: HCPCS | Mod: GY | Performed by: ANESTHESIOLOGY

## 2017-04-17 PROCEDURE — 83735 ASSAY OF MAGNESIUM: CPT | Performed by: SURGERY

## 2017-04-17 PROCEDURE — 25800025 ZZH RX 258: Performed by: SURGERY

## 2017-04-17 PROCEDURE — C1894 INTRO/SHEATH, NON-LASER: HCPCS | Performed by: THORACIC SURGERY (CARDIOTHORACIC VASCULAR SURGERY)

## 2017-04-17 PROCEDURE — 40000014 ZZH STATISTIC ARTERIAL MONITORING DAILY

## 2017-04-17 PROCEDURE — C1769 GUIDE WIRE: HCPCS | Performed by: THORACIC SURGERY (CARDIOTHORACIC VASCULAR SURGERY)

## 2017-04-17 PROCEDURE — 25000565 ZZH ISOFLURANE, EA 15 MIN: Performed by: THORACIC SURGERY (CARDIOTHORACIC VASCULAR SURGERY)

## 2017-04-17 PROCEDURE — 87641 MR-STAPH DNA AMP PROBE: CPT | Performed by: INTERNAL MEDICINE

## 2017-04-17 PROCEDURE — 80053 COMPREHEN METABOLIC PANEL: CPT | Performed by: PHYSICIAN ASSISTANT

## 2017-04-17 PROCEDURE — 85610 PROTHROMBIN TIME: CPT | Performed by: PHYSICIAN ASSISTANT

## 2017-04-17 PROCEDURE — 71000015 ZZH RECOVERY PHASE 1 LEVEL 2 EA ADDTL HR: Performed by: THORACIC SURGERY (CARDIOTHORACIC VASCULAR SURGERY)

## 2017-04-17 PROCEDURE — 25000132 ZZH RX MED GY IP 250 OP 250 PS 637: Mod: GY | Performed by: ANESTHESIOLOGY

## 2017-04-17 PROCEDURE — 82947 ASSAY GLUCOSE BLOOD QUANT: CPT | Performed by: THORACIC SURGERY (CARDIOTHORACIC VASCULAR SURGERY)

## 2017-04-17 PROCEDURE — 84132 ASSAY OF SERUM POTASSIUM: CPT | Performed by: THORACIC SURGERY (CARDIOTHORACIC VASCULAR SURGERY)

## 2017-04-17 PROCEDURE — 82330 ASSAY OF CALCIUM: CPT | Performed by: THORACIC SURGERY (CARDIOTHORACIC VASCULAR SURGERY)

## 2017-04-17 PROCEDURE — 85610 PROTHROMBIN TIME: CPT | Performed by: SURGERY

## 2017-04-17 PROCEDURE — 25000566 ZZH SEVOFLURANE, EA 15 MIN: Performed by: THORACIC SURGERY (CARDIOTHORACIC VASCULAR SURGERY)

## 2017-04-17 PROCEDURE — 0DB54ZZ EXCISION OF ESOPHAGUS, PERCUTANEOUS ENDOSCOPIC APPROACH: ICD-10-PCS | Performed by: THORACIC SURGERY (CARDIOTHORACIC VASCULAR SURGERY)

## 2017-04-17 PROCEDURE — 0D154Z6 BYPASS ESOPHAGUS TO STOMACH, PERCUTANEOUS ENDOSCOPIC APPROACH: ICD-10-PCS | Performed by: THORACIC SURGERY (CARDIOTHORACIC VASCULAR SURGERY)

## 2017-04-17 PROCEDURE — 84100 ASSAY OF PHOSPHORUS: CPT | Performed by: SURGERY

## 2017-04-17 PROCEDURE — 71000014 ZZH RECOVERY PHASE 1 LEVEL 2 FIRST HR: Performed by: THORACIC SURGERY (CARDIOTHORACIC VASCULAR SURGERY)

## 2017-04-17 PROCEDURE — 80048 BASIC METABOLIC PNL TOTAL CA: CPT | Performed by: SURGERY

## 2017-04-17 PROCEDURE — 84295 ASSAY OF SERUM SODIUM: CPT | Performed by: THORACIC SURGERY (CARDIOTHORACIC VASCULAR SURGERY)

## 2017-04-17 PROCEDURE — 36000059 ZZH SURGERY LEVEL 3 EA 15 ADDTL MIN UMMC: Performed by: THORACIC SURGERY (CARDIOTHORACIC VASCULAR SURGERY)

## 2017-04-17 PROCEDURE — 40000170 ZZH STATISTIC PRE-PROCEDURE ASSESSMENT II: Performed by: THORACIC SURGERY (CARDIOTHORACIC VASCULAR SURGERY)

## 2017-04-17 PROCEDURE — 25000125 ZZHC RX 250: Performed by: NURSE ANESTHETIST, CERTIFIED REGISTERED

## 2017-04-17 PROCEDURE — 36000057 ZZH SURGERY LEVEL 3 1ST 30 MIN - UMMC: Performed by: THORACIC SURGERY (CARDIOTHORACIC VASCULAR SURGERY)

## 2017-04-17 PROCEDURE — 0BJ08ZZ INSPECTION OF TRACHEOBRONCHIAL TREE, VIA NATURAL OR ARTIFICIAL OPENING ENDOSCOPIC: ICD-10-PCS | Performed by: THORACIC SURGERY (CARDIOTHORACIC VASCULAR SURGERY)

## 2017-04-17 PROCEDURE — 40000985 XR CHEST PORT 1 VW

## 2017-04-17 PROCEDURE — 0DHA4UZ INSERTION OF FEEDING DEVICE INTO JEJUNUM, PERCUTANEOUS ENDOSCOPIC APPROACH: ICD-10-PCS | Performed by: THORACIC SURGERY (CARDIOTHORACIC VASCULAR SURGERY)

## 2017-04-17 PROCEDURE — 85027 COMPLETE CBC AUTOMATED: CPT | Performed by: SURGERY

## 2017-04-17 PROCEDURE — 0DJ68ZZ INSPECTION OF STOMACH, VIA NATURAL OR ARTIFICIAL OPENING ENDOSCOPIC: ICD-10-PCS | Performed by: THORACIC SURGERY (CARDIOTHORACIC VASCULAR SURGERY)

## 2017-04-17 PROCEDURE — 37000008 ZZH ANESTHESIA TECHNICAL FEE, 1ST 30 MIN: Performed by: THORACIC SURGERY (CARDIOTHORACIC VASCULAR SURGERY)

## 2017-04-17 PROCEDURE — 40000275 ZZH STATISTIC RCP TIME EA 10 MIN

## 2017-04-17 PROCEDURE — 99233 SBSQ HOSP IP/OBS HIGH 50: CPT | Mod: GC | Performed by: SURGERY

## 2017-04-17 PROCEDURE — 25000128 H RX IP 250 OP 636: Performed by: ANESTHESIOLOGY

## 2017-04-17 PROCEDURE — 20000004 ZZH R&B ICU UMMC

## 2017-04-17 PROCEDURE — 25000132 ZZH RX MED GY IP 250 OP 250 PS 637: Mod: GY | Performed by: THORACIC SURGERY (CARDIOTHORACIC VASCULAR SURGERY)

## 2017-04-17 PROCEDURE — 36415 COLL VENOUS BLD VENIPUNCTURE: CPT | Performed by: PHYSICIAN ASSISTANT

## 2017-04-17 PROCEDURE — 27210794 ZZH OR GENERAL SUPPLY STERILE: Performed by: THORACIC SURGERY (CARDIOTHORACIC VASCULAR SURGERY)

## 2017-04-17 PROCEDURE — 25000128 H RX IP 250 OP 636: Performed by: SURGERY

## 2017-04-17 PROCEDURE — 37000009 ZZH ANESTHESIA TECHNICAL FEE, EACH ADDTL 15 MIN: Performed by: THORACIC SURGERY (CARDIOTHORACIC VASCULAR SURGERY)

## 2017-04-17 PROCEDURE — 85018 HEMOGLOBIN: CPT | Performed by: PHYSICIAN ASSISTANT

## 2017-04-17 PROCEDURE — P9016 RBC LEUKOCYTES REDUCED: HCPCS | Performed by: PHYSICIAN ASSISTANT

## 2017-04-17 PROCEDURE — 87640 STAPH A DNA AMP PROBE: CPT | Performed by: INTERNAL MEDICINE

## 2017-04-17 PROCEDURE — 82803 BLOOD GASES ANY COMBINATION: CPT | Performed by: THORACIC SURGERY (CARDIOTHORACIC VASCULAR SURGERY)

## 2017-04-17 PROCEDURE — 0DN64ZZ RELEASE STOMACH, PERCUTANEOUS ENDOSCOPIC APPROACH: ICD-10-PCS | Performed by: THORACIC SURGERY (CARDIOTHORACIC VASCULAR SURGERY)

## 2017-04-17 PROCEDURE — A9270 NON-COVERED ITEM OR SERVICE: HCPCS | Mod: GY | Performed by: THORACIC SURGERY (CARDIOTHORACIC VASCULAR SURGERY)

## 2017-04-17 PROCEDURE — C9399 UNCLASSIFIED DRUGS OR BIOLOG: HCPCS | Performed by: NURSE ANESTHETIST, CERTIFIED REGISTERED

## 2017-04-17 PROCEDURE — 25000125 ZZHC RX 250: Performed by: THORACIC SURGERY (CARDIOTHORACIC VASCULAR SURGERY)

## 2017-04-17 RX ORDER — METOPROLOL TARTRATE 1 MG/ML
5 INJECTION, SOLUTION INTRAVENOUS EVERY 6 HOURS
Status: DISCONTINUED | OUTPATIENT
Start: 2017-04-17 | End: 2017-04-18

## 2017-04-17 RX ORDER — CEFAZOLIN SODIUM 2 G/100ML
2 INJECTION, SOLUTION INTRAVENOUS
Status: COMPLETED | OUTPATIENT
Start: 2017-04-17 | End: 2017-04-17

## 2017-04-17 RX ORDER — SODIUM CHLORIDE 9 MG/ML
INJECTION, SOLUTION INTRAVENOUS CONTINUOUS PRN
Status: DISCONTINUED | OUTPATIENT
Start: 2017-04-17 | End: 2017-04-17

## 2017-04-17 RX ORDER — ONDANSETRON 2 MG/ML
INJECTION INTRAMUSCULAR; INTRAVENOUS PRN
Status: DISCONTINUED | OUTPATIENT
Start: 2017-04-17 | End: 2017-04-17

## 2017-04-17 RX ORDER — CEFAZOLIN SODIUM 1 G/3ML
1 INJECTION, POWDER, FOR SOLUTION INTRAMUSCULAR; INTRAVENOUS SEE ADMIN INSTRUCTIONS
Status: DISCONTINUED | OUTPATIENT
Start: 2017-04-17 | End: 2017-04-17 | Stop reason: HOSPADM

## 2017-04-17 RX ORDER — FENTANYL CITRATE 50 UG/ML
25-50 INJECTION, SOLUTION INTRAMUSCULAR; INTRAVENOUS
Status: DISCONTINUED | OUTPATIENT
Start: 2017-04-17 | End: 2017-04-17 | Stop reason: HOSPADM

## 2017-04-17 RX ORDER — POTASSIUM CL/LIDO/0.9 % NACL 10MEQ/0.1L
10 INTRAVENOUS SOLUTION, PIGGYBACK (ML) INTRAVENOUS
Status: DISCONTINUED | OUTPATIENT
Start: 2017-04-17 | End: 2017-04-18

## 2017-04-17 RX ORDER — SODIUM CHLORIDE, SODIUM LACTATE, POTASSIUM CHLORIDE, CALCIUM CHLORIDE 600; 310; 30; 20 MG/100ML; MG/100ML; MG/100ML; MG/100ML
INJECTION, SOLUTION INTRAVENOUS CONTINUOUS
Status: DISCONTINUED | OUTPATIENT
Start: 2017-04-17 | End: 2017-04-21

## 2017-04-17 RX ORDER — ONDANSETRON 2 MG/ML
4 INJECTION INTRAMUSCULAR; INTRAVENOUS EVERY 30 MIN PRN
Status: DISCONTINUED | OUTPATIENT
Start: 2017-04-17 | End: 2017-04-17 | Stop reason: HOSPADM

## 2017-04-17 RX ORDER — FENTANYL CITRATE 50 UG/ML
INJECTION, SOLUTION INTRAMUSCULAR; INTRAVENOUS PRN
Status: DISCONTINUED | OUTPATIENT
Start: 2017-04-17 | End: 2017-04-17

## 2017-04-17 RX ORDER — ESMOLOL HYDROCHLORIDE 10 MG/ML
INJECTION INTRAVENOUS PRN
Status: DISCONTINUED | OUTPATIENT
Start: 2017-04-17 | End: 2017-04-17

## 2017-04-17 RX ORDER — NICOTINE POLACRILEX 4 MG
15-30 LOZENGE BUCCAL
Status: DISCONTINUED | OUTPATIENT
Start: 2017-04-17 | End: 2017-04-28 | Stop reason: HOSPADM

## 2017-04-17 RX ORDER — POTASSIUM CHLORIDE 1.5 G/1.58G
20-40 POWDER, FOR SOLUTION ORAL
Status: DISCONTINUED | OUTPATIENT
Start: 2017-04-17 | End: 2017-04-18

## 2017-04-17 RX ORDER — POTASSIUM CHLORIDE 29.8 MG/ML
INJECTION INTRAVENOUS PRN
Status: DISCONTINUED | OUTPATIENT
Start: 2017-04-17 | End: 2017-04-17

## 2017-04-17 RX ORDER — NALOXONE HYDROCHLORIDE 0.4 MG/ML
.1-.4 INJECTION, SOLUTION INTRAMUSCULAR; INTRAVENOUS; SUBCUTANEOUS
Status: DISCONTINUED | OUTPATIENT
Start: 2017-04-17 | End: 2017-04-28 | Stop reason: HOSPADM

## 2017-04-17 RX ORDER — POTASSIUM CHLORIDE 7.45 MG/ML
10 INJECTION INTRAVENOUS
Status: DISCONTINUED | OUTPATIENT
Start: 2017-04-17 | End: 2017-04-18

## 2017-04-17 RX ORDER — PROCHLORPERAZINE 25 MG
12.5 SUPPOSITORY, RECTAL RECTAL EVERY 12 HOURS PRN
Status: DISCONTINUED | OUTPATIENT
Start: 2017-04-17 | End: 2017-04-28 | Stop reason: HOSPADM

## 2017-04-17 RX ORDER — ACETAMINOPHEN 325 MG/1
975 TABLET ORAL ONCE
Status: DISCONTINUED | OUTPATIENT
Start: 2017-04-17 | End: 2017-04-17

## 2017-04-17 RX ORDER — POTASSIUM CHLORIDE 29.8 MG/ML
20 INJECTION INTRAVENOUS
Status: DISCONTINUED | OUTPATIENT
Start: 2017-04-17 | End: 2017-04-18

## 2017-04-17 RX ORDER — ONDANSETRON 4 MG/1
4 TABLET, ORALLY DISINTEGRATING ORAL EVERY 6 HOURS PRN
Status: DISCONTINUED | OUTPATIENT
Start: 2017-04-17 | End: 2017-04-19

## 2017-04-17 RX ORDER — OXYMETAZOLINE HYDROCHLORIDE 0.05 G/100ML
SPRAY NASAL PRN
Status: DISCONTINUED | OUTPATIENT
Start: 2017-04-17 | End: 2017-04-17 | Stop reason: HOSPADM

## 2017-04-17 RX ORDER — SODIUM CHLORIDE, SODIUM LACTATE, POTASSIUM CHLORIDE, CALCIUM CHLORIDE 600; 310; 30; 20 MG/100ML; MG/100ML; MG/100ML; MG/100ML
INJECTION, SOLUTION INTRAVENOUS CONTINUOUS
Status: DISCONTINUED | OUTPATIENT
Start: 2017-04-17 | End: 2017-04-17 | Stop reason: HOSPADM

## 2017-04-17 RX ORDER — MAGNESIUM SULFATE HEPTAHYDRATE 40 MG/ML
4 INJECTION, SOLUTION INTRAVENOUS EVERY 4 HOURS PRN
Status: DISCONTINUED | OUTPATIENT
Start: 2017-04-17 | End: 2017-04-18

## 2017-04-17 RX ORDER — ACETAMINOPHEN 10 MG/ML
15 INJECTION, SOLUTION INTRAVENOUS EVERY 8 HOURS
Status: DISCONTINUED | OUTPATIENT
Start: 2017-04-17 | End: 2017-04-18

## 2017-04-17 RX ORDER — EPHEDRINE SULFATE 50 MG/ML
INJECTION, SOLUTION INTRAMUSCULAR; INTRAVENOUS; SUBCUTANEOUS PRN
Status: DISCONTINUED | OUTPATIENT
Start: 2017-04-17 | End: 2017-04-17

## 2017-04-17 RX ORDER — ONDANSETRON 2 MG/ML
4 INJECTION INTRAMUSCULAR; INTRAVENOUS EVERY 6 HOURS PRN
Status: DISCONTINUED | OUTPATIENT
Start: 2017-04-17 | End: 2017-04-19

## 2017-04-17 RX ORDER — GABAPENTIN 300 MG/1
300 CAPSULE ORAL ONCE
Status: COMPLETED | OUTPATIENT
Start: 2017-04-17 | End: 2017-04-17

## 2017-04-17 RX ORDER — KETAMINE HYDROCHLORIDE 10 MG/ML
INJECTION, SOLUTION INTRAMUSCULAR; INTRAVENOUS PRN
Status: DISCONTINUED | OUTPATIENT
Start: 2017-04-17 | End: 2017-04-17

## 2017-04-17 RX ORDER — HYDROMORPHONE HYDROCHLORIDE 1 MG/ML
.3-.5 INJECTION, SOLUTION INTRAMUSCULAR; INTRAVENOUS; SUBCUTANEOUS EVERY 5 MIN PRN
Status: DISCONTINUED | OUTPATIENT
Start: 2017-04-17 | End: 2017-04-17 | Stop reason: HOSPADM

## 2017-04-17 RX ORDER — ALBUTEROL SULFATE 0.83 MG/ML
2.5 SOLUTION RESPIRATORY (INHALATION) EVERY 4 HOURS PRN
Status: DISCONTINUED | OUTPATIENT
Start: 2017-04-17 | End: 2017-04-17 | Stop reason: HOSPADM

## 2017-04-17 RX ORDER — POTASSIUM CHLORIDE 750 MG/1
20-40 TABLET, EXTENDED RELEASE ORAL
Status: DISCONTINUED | OUTPATIENT
Start: 2017-04-17 | End: 2017-04-18

## 2017-04-17 RX ORDER — PROPOFOL 10 MG/ML
INJECTION, EMULSION INTRAVENOUS PRN
Status: DISCONTINUED | OUTPATIENT
Start: 2017-04-17 | End: 2017-04-17

## 2017-04-17 RX ORDER — LIDOCAINE HYDROCHLORIDE 20 MG/ML
INJECTION, SOLUTION INFILTRATION; PERINEURAL PRN
Status: DISCONTINUED | OUTPATIENT
Start: 2017-04-17 | End: 2017-04-17

## 2017-04-17 RX ORDER — PROCHLORPERAZINE MALEATE 5 MG
5 TABLET ORAL EVERY 6 HOURS PRN
Status: DISCONTINUED | OUTPATIENT
Start: 2017-04-17 | End: 2017-04-28 | Stop reason: HOSPADM

## 2017-04-17 RX ORDER — BUPIVACAINE HYDROCHLORIDE AND EPINEPHRINE 2.5; 5 MG/ML; UG/ML
INJECTION, SOLUTION INFILTRATION; PERINEURAL PRN
Status: DISCONTINUED | OUTPATIENT
Start: 2017-04-17 | End: 2017-04-17 | Stop reason: HOSPADM

## 2017-04-17 RX ORDER — ONDANSETRON 4 MG/1
4 TABLET, ORALLY DISINTEGRATING ORAL EVERY 30 MIN PRN
Status: DISCONTINUED | OUTPATIENT
Start: 2017-04-17 | End: 2017-04-17 | Stop reason: HOSPADM

## 2017-04-17 RX ORDER — DEXTROSE MONOHYDRATE 25 G/50ML
25-50 INJECTION, SOLUTION INTRAVENOUS
Status: DISCONTINUED | OUTPATIENT
Start: 2017-04-17 | End: 2017-04-28 | Stop reason: HOSPADM

## 2017-04-17 RX ADMIN — FENTANYL CITRATE 50 MCG: 50 INJECTION INTRAMUSCULAR; INTRAVENOUS at 17:16

## 2017-04-17 RX ADMIN — KETAMINE HYDROCHLORIDE 20 MG: 10 INJECTION, SOLUTION INTRAMUSCULAR; INTRAVENOUS at 10:57

## 2017-04-17 RX ADMIN — ACETAMINOPHEN 975 MG: 160 SUSPENSION ORAL at 06:56

## 2017-04-17 RX ADMIN — PHENYLEPHRINE HYDROCHLORIDE 100 MCG: 10 INJECTION, SOLUTION INTRAMUSCULAR; INTRAVENOUS; SUBCUTANEOUS at 16:18

## 2017-04-17 RX ADMIN — ROCURONIUM BROMIDE 20 MG: 10 INJECTION INTRAVENOUS at 08:10

## 2017-04-17 RX ADMIN — FENTANYL CITRATE 50 MCG: 50 INJECTION, SOLUTION INTRAMUSCULAR; INTRAVENOUS at 10:57

## 2017-04-17 RX ADMIN — PHENYLEPHRINE HYDROCHLORIDE 100 MCG: 10 INJECTION, SOLUTION INTRAMUSCULAR; INTRAVENOUS; SUBCUTANEOUS at 12:52

## 2017-04-17 RX ADMIN — Medication 5 MG: at 08:10

## 2017-04-17 RX ADMIN — PROPOFOL 60 MG: 10 INJECTION, EMULSION INTRAVENOUS at 08:00

## 2017-04-17 RX ADMIN — ROCURONIUM BROMIDE 20 MG: 10 INJECTION INTRAVENOUS at 11:15

## 2017-04-17 RX ADMIN — GABAPENTIN 300 MG: 300 CAPSULE ORAL at 06:56

## 2017-04-17 RX ADMIN — HYDROMORPHONE HYDROCHLORIDE 0.5 MG: 1 INJECTION, SOLUTION INTRAMUSCULAR; INTRAVENOUS; SUBCUTANEOUS at 11:01

## 2017-04-17 RX ADMIN — CEFAZOLIN SODIUM 1 G: 2 INJECTION, SOLUTION INTRAVENOUS at 10:57

## 2017-04-17 RX ADMIN — HYDROMORPHONE HYDROCHLORIDE 0.5 MG: 1 INJECTION, SOLUTION INTRAMUSCULAR; INTRAVENOUS; SUBCUTANEOUS at 09:43

## 2017-04-17 RX ADMIN — Medication 10 MG: at 15:05

## 2017-04-17 RX ADMIN — Medication 5 MG: at 11:23

## 2017-04-17 RX ADMIN — HYDROMORPHONE HYDROCHLORIDE 0.5 MG: 1 INJECTION, SOLUTION INTRAMUSCULAR; INTRAVENOUS; SUBCUTANEOUS at 09:49

## 2017-04-17 RX ADMIN — ROCURONIUM BROMIDE 30 MG: 10 INJECTION INTRAVENOUS at 09:00

## 2017-04-17 RX ADMIN — HYDROMORPHONE HYDROCHLORIDE 0.5 MG: 10 INJECTION, SOLUTION INTRAMUSCULAR; INTRAVENOUS; SUBCUTANEOUS at 17:42

## 2017-04-17 RX ADMIN — HYDROMORPHONE HYDROCHLORIDE: 10 INJECTION, SOLUTION INTRAMUSCULAR; INTRAVENOUS; SUBCUTANEOUS at 18:06

## 2017-04-17 RX ADMIN — PROPOFOL 60 MG: 10 INJECTION, EMULSION INTRAVENOUS at 16:09

## 2017-04-17 RX ADMIN — CEFAZOLIN SODIUM 1 G: 2 INJECTION, SOLUTION INTRAVENOUS at 13:00

## 2017-04-17 RX ADMIN — FENTANYL CITRATE 50 MCG: 50 INJECTION INTRAMUSCULAR; INTRAVENOUS at 17:26

## 2017-04-17 RX ADMIN — CEFAZOLIN SODIUM 2 G: 2 INJECTION, SOLUTION INTRAVENOUS at 09:06

## 2017-04-17 RX ADMIN — PHENYLEPHRINE HYDROCHLORIDE 100 MCG: 10 INJECTION, SOLUTION INTRAMUSCULAR; INTRAVENOUS; SUBCUTANEOUS at 11:54

## 2017-04-17 RX ADMIN — PHENYLEPHRINE HYDROCHLORIDE 100 MCG: 10 INJECTION, SOLUTION INTRAMUSCULAR; INTRAVENOUS; SUBCUTANEOUS at 16:28

## 2017-04-17 RX ADMIN — FENTANYL CITRATE 100 MCG: 50 INJECTION, SOLUTION INTRAMUSCULAR; INTRAVENOUS at 09:56

## 2017-04-17 RX ADMIN — DEXMEDETOMIDINE 0.3 MCG/KG/HR: 100 INJECTION, SOLUTION, CONCENTRATE INTRAVENOUS at 10:22

## 2017-04-17 RX ADMIN — PHENYLEPHRINE HYDROCHLORIDE 100 MCG: 10 INJECTION, SOLUTION INTRAMUSCULAR; INTRAVENOUS; SUBCUTANEOUS at 15:06

## 2017-04-17 RX ADMIN — PHENYLEPHRINE HYDROCHLORIDE 100 MCG: 10 INJECTION, SOLUTION INTRAMUSCULAR; INTRAVENOUS; SUBCUTANEOUS at 12:12

## 2017-04-17 RX ADMIN — POTASSIUM CHLORIDE 20 MEQ: 29.8 INJECTION, SOLUTION INTRAVENOUS at 13:41

## 2017-04-17 RX ADMIN — FENTANYL CITRATE 50 MCG: 50 INJECTION INTRAMUSCULAR; INTRAVENOUS at 18:21

## 2017-04-17 RX ADMIN — ONDANSETRON 4 MG: 2 INJECTION INTRAMUSCULAR; INTRAVENOUS at 15:18

## 2017-04-17 RX ADMIN — FENTANYL CITRATE 50 MCG: 50 INJECTION, SOLUTION INTRAMUSCULAR; INTRAVENOUS at 07:57

## 2017-04-17 RX ADMIN — ESMOLOL HYDROCHLORIDE 25 MG: 10 INJECTION, SOLUTION INTRAVENOUS at 15:10

## 2017-04-17 RX ADMIN — SUCCINYLCHOLINE CHLORIDE 50 MG: 20 INJECTION, SOLUTION INTRAMUSCULAR; INTRAVENOUS at 08:00

## 2017-04-17 RX ADMIN — ROCURONIUM BROMIDE 20 MG: 10 INJECTION INTRAVENOUS at 09:38

## 2017-04-17 RX ADMIN — PROPOFOL 50 MG: 10 INJECTION, EMULSION INTRAVENOUS at 15:08

## 2017-04-17 RX ADMIN — SODIUM CHLORIDE, POTASSIUM CHLORIDE, SODIUM LACTATE AND CALCIUM CHLORIDE: 600; 310; 30; 20 INJECTION, SOLUTION INTRAVENOUS at 17:27

## 2017-04-17 RX ADMIN — Medication 5 MG: at 11:54

## 2017-04-17 RX ADMIN — PHENYLEPHRINE HYDROCHLORIDE 50 MCG: 10 INJECTION, SOLUTION INTRAMUSCULAR; INTRAVENOUS; SUBCUTANEOUS at 14:04

## 2017-04-17 RX ADMIN — MIDAZOLAM HYDROCHLORIDE 2 MG: 1 INJECTION, SOLUTION INTRAMUSCULAR; INTRAVENOUS at 07:41

## 2017-04-17 RX ADMIN — SUGAMMADEX 100 MG: 100 INJECTION, SOLUTION INTRAVENOUS at 15:58

## 2017-04-17 RX ADMIN — PHENYLEPHRINE HYDROCHLORIDE 100 MCG: 10 INJECTION, SOLUTION INTRAMUSCULAR; INTRAVENOUS; SUBCUTANEOUS at 16:10

## 2017-04-17 RX ADMIN — FENTANYL CITRATE 50 MCG: 50 INJECTION, SOLUTION INTRAMUSCULAR; INTRAVENOUS at 07:58

## 2017-04-17 RX ADMIN — CEFAZOLIN SODIUM 1 G: 2 INJECTION, SOLUTION INTRAVENOUS at 14:55

## 2017-04-17 RX ADMIN — KETAMINE HYDROCHLORIDE 20 MG: 10 INJECTION, SOLUTION INTRAMUSCULAR; INTRAVENOUS at 09:52

## 2017-04-17 RX ADMIN — LIDOCAINE HYDROCHLORIDE 40 MG: 20 INJECTION, SOLUTION INFILTRATION; PERINEURAL at 07:57

## 2017-04-17 RX ADMIN — ROCURONIUM BROMIDE 30 MG: 10 INJECTION INTRAVENOUS at 08:18

## 2017-04-17 RX ADMIN — FENTANYL CITRATE 150 MCG: 50 INJECTION, SOLUTION INTRAMUSCULAR; INTRAVENOUS at 08:45

## 2017-04-17 RX ADMIN — KETAMINE HYDROCHLORIDE 20 MG: 10 INJECTION, SOLUTION INTRAMUSCULAR; INTRAVENOUS at 11:52

## 2017-04-17 RX ADMIN — Medication 5 MG: at 15:06

## 2017-04-17 RX ADMIN — PHENYLEPHRINE HYDROCHLORIDE 100 MCG: 10 INJECTION, SOLUTION INTRAMUSCULAR; INTRAVENOUS; SUBCUTANEOUS at 15:21

## 2017-04-17 RX ADMIN — SODIUM CHLORIDE: 9 INJECTION, SOLUTION INTRAVENOUS at 07:40

## 2017-04-17 RX ADMIN — FENTANYL CITRATE 50 MCG: 50 INJECTION INTRAMUSCULAR; INTRAVENOUS at 18:13

## 2017-04-17 ASSESSMENT — ACTIVITIES OF DAILY LIVING (ADL): WHICH_OF_THE_ABOVE_FUNCTIONAL_RISKS_HAD_A_RECENT_ONSET_OR_CHANGE?: SWALLOWING;EATING

## 2017-04-17 NOTE — OR NURSING
"Writer spoke with Dr. Dee. He stated that PCA orders are in, and that \"labs look fine, she needs magnesium and potassium replaced at some point, she doesn't need to stay in PACU for that.\"   "

## 2017-04-17 NOTE — ANESTHESIA CARE TRANSFER NOTE
Patient: Minerva Blanco    Procedure(s):  Laparoscopic , Neck Dissection, Spit Fistula Takedown, Laparoscopic Jejunostomy Tube and Pharyngostomy Tube, Gastric Pull up, Upper Endoscopy(EGD)  , Flexible Bronchoscopy ,Sternotomy  - Wound Class: II-Clean Contaminated   - Wound Class: II-Clean Contaminated   - Wound Class: II-Clean Contaminated   - Wound Class: II-Clean Contaminated    Diagnosis: Esophageal Perforation   Diagnosis Additional Information: No value filed.    Anesthesia Type:   General, ETT     Note:  Airway :Face Mask  Patient transferred to:PACU  Comments: VSS, patent airway, report to RN.      Vitals: (Last set prior to Anesthesia Care Transfer)    CRNA VITALS  4/17/2017 1351 - 4/17/2017 1451      4/17/2017             ART BP: 126/55    Ht Rate: 69    SpO2: 99 %      CRNA VITALS  4/17/2017 1607 - 4/17/2017 1643      4/17/2017             Pulse: 68    ART BP: (!)  83/37    ART Mean: 57    Ht Rate: 67    SpO2: 98 %    Resp Rate (observed): (!)  2                Electronically Signed By: CLIVE Palmer CRNA  April 17, 2017  4:43 PM

## 2017-04-17 NOTE — OR NURSING
Writer notified Dr. Rutledge that CXR is completed. Dr. Rutledge stated she will review chest x ray.

## 2017-04-17 NOTE — IP AVS SNAPSHOT
[address] [address]    Phone:  [phone]                                       After Visit Summary   4/17/2017    Minerva Blanco    MRN: [id number]           After Visit Summary Signature Page     I have received my discharge instructions, and my questions have been answered. I have discussed any challenges I see with this plan with the nurse or doctor.    ..........................................................................................................................................  Patient/Patient Representative Signature      ..........................................................................................................................................  Patient Representative Print Name and Relationship to Patient    ..................................................               ................................................  Date                                            Time    ..........................................................................................................................................  Reviewed by Signature/Title    ...................................................              ..............................................  Date                                                            Time

## 2017-04-17 NOTE — OR NURSING
"Writer spoke with Dr. Alvin Dee. He stated he's reviewed the chest x ray, and \"it looks good.\" he stated that he will enter orders for post operative pain management.   "

## 2017-04-17 NOTE — ANESTHESIA POSTPROCEDURE EVALUATION
Patient: Minerva Blanco    Procedure(s):  Laparoscopic , Neck Dissection, Spit Fistula Takedown, Laparoscopic Jejunostomy Tube and Pharyngostomy Tube, Gastric Pull up, Upper Endoscopy(EGD)  , Flexible Bronchoscopy ,Sternotomy  - Wound Class: II-Clean Contaminated   - Wound Class: II-Clean Contaminated   - Wound Class: II-Clean Contaminated   - Wound Class: II-Clean Contaminated    Diagnosis:Esophageal Perforation   Diagnosis Additional Information: No value filed.    Anesthesia Type:  General, ETT    Note:  Anesthesia Post Evaluation    Patient location during evaluation: PACU  Patient participation: Able to fully participate in evaluation  Level of consciousness: awake  Pain management: adequate  Airway patency: patent  Cardiovascular status: acceptable  Respiratory status: acceptable  Hydration status: acceptable  PONV: none     Anesthetic complications: None    Comments: Plan to transfer to the ICU.        Last vitals:  Vitals:    04/17/17 1640 04/17/17 1645 04/17/17 1700   BP: 94/63 96/57 101/56   Resp: 16 16 16   Temp: 36  C (96.8  F)     SpO2: 100% 100% 100%         Electronically Signed By: Sapphire Rutledge MD  April 17, 2017  5:19 PM

## 2017-04-17 NOTE — ANESTHESIA PROCEDURE NOTES
Central Line Procedure Note  Staff:     Anesthesiologist:  EDMAR CONTRERAS    Resident/CRNA:  MARGE SHEETS    Central line placed by Resident/CRNA in the presence of a teaching physician    Location: In OR after induction  Procedure Start/Stop Times:     patient identified, IV checked, site marked, risks and benefits discussed, informed consent, monitors and equipment checked, pre-op evaluation and at physician/surgeon's request      Correct Patient: Yes      Correct Position: Yes      Correct Site: Yes      Correct Procedure: Yes      Correct Laterality:  Yes    Site Marked:  Yes  Line Placement:     Procedure:  Central Line    Insertion laterality:  Right    Insertion site:  Internal Jugular      Maximal Sterile Barriers: All elements of maximal sterile barrier technique followed      (Maximal sterile barriers include:   Sterile gown, Sterile Gloves, Mask, Cap, Whole body draped, hand hygiene and acceptable skin prep).Skin Prep: Chloraprep         Injection Technique:  Ultrasound guided    Sterile Ultrasound Technique:  Sterile probe cover and Sterile gel    Vein evaluated via U/S for patency/adequacy of catheter insertion and is adequate.  Using realtime U/S imaging the vein was punctured, and needle was observed entering vein on U/S      Permanent Image entered into patient's record      Local skin infiltration:  None    Catheter size:  12 Fr, 3 lumen, 20 cm    Catheter length at skin (cm):  14    Cath secured with: suture      Dressing:  Tegaderm and Biopatch    Complications:  None obvious    Blood aspirated all lumens: Yes      All Lumens Flushed: Yes      Verification method:  Placement to be verified post-op

## 2017-04-17 NOTE — ANESTHESIA PROCEDURE NOTES
Arterial Line Procedure Note  Staff:     Anesthesiologist:  EDMAR CONTRERAS  Location: In OR After Induction  Procedure Start/Stop Times:     patient identified, IV checked, site marked, risks and benefits discussed, informed consent, monitors and equipment checked, pre-op evaluation and at physician/surgeon's request      Correct Patient: Yes      Correct Position: Yes      Correct Site: Yes      Correct Procedure: Yes      Correct Laterality:  Yes    Site Marked:  Yes  Line Placement:     Procedure:  Arterial Line    Insertion Site:  Femoral    Insertion laterality:  Right    Skin Prep: Chloraprep      Patient Prep: patient draped, mask, sterile gloves and hat      Ultrasound Guided?: Yes      Artery evaluated via ultrasound confirming patency.   Using realtime imaging, the artery was punctured and the needle was observed entering the artery.      A permanent image is entered into patient's chart.      Catheter size:  20 gauge, 12 cm    Dressing:  Tegaderm    Complications:  None obvious    Arterial waveform: Yes      IBP within 10% of NIBP: Yes

## 2017-04-17 NOTE — BRIEF OP NOTE
[facility], [place]    Brief Operative Note    Pre-operative diagnosis: Esophageal Perforation   Post-operative diagnosis * No post-op diagnosis entered *  Procedure: Procedure(s):  Laparoscopic , Neck Dissection, Spit Fistula Takedown, Laparoscopic Jejunostomy Tube and Pharyngostomy Tube, Gastric Pull up, Upper Endoscopy(EGD)  , Flexible Bronchoscopy ,Sternotomy  - Wound Class: II-Clean Contaminated   - Wound Class: II-Clean Contaminated   - Wound Class: II-Clean Contaminated   - Wound Class: II-Clean Contaminated  Surgeon: Surgeon(s) and Role:     * Jens Wise MD - Primary     * Nalini Barreto MD - Assisting     * Alvin Dee MD- Resident- Assisting   Anesthesia: General   Estimated blood loss: 300cc  Drains: Pharyngostomy, L CT, J tube  Specimens: * No specimens in log *  Findings:   See dictated operative report.  Complications: None.  Implants: None.

## 2017-04-17 NOTE — LETTER
Transition Communication Hand-off for Care Transitions to Next Level of Care Provider    Name: Minerva Blanco  MRN #: [id number]  Primary Care Provider: Andrea Nino     Primary Clinic: [place] [address]     Reason for Hospitalization:  Esophageal perforation [K22.3]  Admit Date/Time: 4/17/2017  5:42 AM  Discharge Date: 4/28/2017  Payor Source: Payor: BCBS / Plan: BCBS PLATINUM BLUE / Product Type: PPO /     Reason for Communication Hand-off Referral: Other continuity of care    Discharge Plan:  Discharged to home with resumption of services and plan for f/u in clinic     Discharge Needs Assessment:  Needs       Most Recent Value    Equipment Currently Used at Home none    Transportation Available family or friend will provide          Follow-up plan:  Future Appointments  Date Time Provider Department Center   5/4/2017 2:30 PM Mike Enamorado, DO [facility]       Any outstanding tests or procedures:        Radiology & Cardiology Orders     Future Labs/Procedures Complete By Expires    XR Chest 2 Views  4/28/2017 7/27/2017        Referrals     Future Labs/Procedures    Home care nursing referral     Comments:    _______________________  [facility]  Phone  [phone]  Fax  [phone]  ______________________    Resumption of Home Care Services  RN skilled nursing visit. RN to assess vital signs and weight, respiratory and cardiac status, pain level and activity tolerance, incision for signs/symptoms of infection, hydration, nutrition and bowel status and home safety.  RN to teach medication management.  RN to provide tube site care and management.  RN to provide INR checks.  Patient will need first INR drawn 5/1.  Report INR levels to the [facility] at Phone: [phone] Fax: [phone]    PT/OT to eval and treat    Your provider has ordered home care nursing services. If you have not been contacted within 2  days of your discharge please call the inpatient department phone number at [phone] .    Home infusion referral     Comments:    Your provider has referred you to: [facility]: [facility] [place] [phone]   [email or web address]    Local Address (if different from home address): N/A    Anticipated Length of Therapy: Per MD Order  Enteral Nutrition via J tube  2 cartons TwoCal HN + 2 carton isosource 1.5 via Deny Pump  500ml-600ml water daily      Home Infusion Pharmacist to adjust therapy based on labs and clinical assessments: Yes    Labs:  May draw labs from Venous Catheter: Yes  Home Infusion Pharmacist to order labs based on therapy type and clinical assessments: Yes    Agency Staff to assess nursing needs for Infusion Therapy.            Sarina Orellana, [facility]INE[facility]  [phone]    AVS/Discharge Summary is the source of truth; this is a helpful guide for improved communication of patient story

## 2017-04-17 NOTE — IP AVS SNAPSHOT
MRN:[id number]                      After Visit Summary   4/17/2017    Minerva Blanco    MRN: [id number]           Thank you!     Thank you for choosing [facility] for your care. Our goal is always to provide you with excellent care. Hearing back from our patients is one way we can continue to improve our services. Please take a few minutes to complete the written survey that you may receive in the mail after you visit with us. Thank you!        Patient Information     Date Of Birth          1945        Designated Caregiver       Most Recent Value    Caregiver    Will someone help with your care after discharge? yes    Name of designated caregiver Enrique    Phone number of caregiver     Caregiver address [address] [facility] [address]      About your hospital stay     You were admitted on:  April 17, 2017 You last received care in the:  [address] [facility]    You were discharged on:  April 28, 2017       Who to Call     For medical emergencies, please call 911.  For non-urgent questions about your medical care, please call your primary care provider or clinic, [phone]  For questions related to your surgery, please call your surgery clinic        Attending Provider     Provider Specialty    Jens Wise MD Thoracic Diseases    [facility], Andrea Simon MD Surgery       Primary Care Provider Office Phone # Fax #    Andrea Nino [phone]       [address] [address]        After Care Instructions     Discharge Instructions       DIET: Clear liquid diet for the next 5 days, then full liquid diet for one week, then soft mechanical diet until follow up.  With the soft diet chew very well and take 4-5 small meals per day, or more if necessary.  Do not eat significant amounts after 6pm to minimize reflux at night.    You will continue tube feeds until follow up, so food by mouth is primarily for  pleasure.  Transition to each new diet stage and introduce new foods slowly to  how well you tolerate them.    Follow instructions provided by the dietician and the speech therapist    Sleep with the head of your bed elevated to help prevent reflux, which is common after esophagectomy    If your travel plans upon discharge include prolonged periods of sitting (a lengthy car or plane ride), it is highly beneficial to get up and walk at least once per hour to help prevent swelling and blood clots.     You may remove chest tube dressing 48 hours after tube removal and bandage the site at your own discretion thereafter.  Small amounts of leakage are normal for 2-3 days after removal.  Feel free to call with questions.    You may get incision wet 2 days after operation. Do not submerge, soak, or scrub incision or swim until seen in follow-up.    Take incentive spirometer home for continued frequent use    Activity as tolerated, no strenous activity until seen in follow-up, no lifting greater than 10 pounds for the next 2-4 weeks.    Stay hydrated. Take over the counter fiber (metamucil or benefiber) and stool softeners (Miralax, docusate or senna) if becoming constipated.     Call for fever greater than 101.5, chills, increased size of incision, red skin around incision, vision changes, muscle strength changes, sensation changes, shortness of breath, or other concerns.    No driving while taking narcotic pain medication.    Transition to ibuprofen or tylenol/acetaminophen for pain control. Do not take tylenol/acetaminophen and acetaminophen containing narcotic (e.g., percocet or vicodin) at the same time. If you have known ulcer problems, or kidney trouble (elevated creatinine) do not take the ibuprofen.    In emergencies, call 911    For other Questions or Concerns;   A.) During weekday working hours (Monday through Friday 8am to 4:30pm)   call [phone] and ask to speak to a clinical nurse  nubia.     B.) At nights (after 4:30pm), on weekends, or if urgent call [phone] and   tell the   I would like to page job code [phone], the thoracic surgery   fellow on call, please.             Wound care and dressings       Daily wet to dry dressing change to spit fistula site                  Follow-up Appointments     Follow Up and recommended labs and tests       1.) Follow up with an INR draw within 3 days for primary care physician, Andrea Nino, or coumadin clinic management of postop Lovenox bridge.  2.) Follow up with Jens Wise MD in Thoracic Surgery clinic in 1 month, prior to which a CXR should be performed.   3.) Follow up with outpatient pain appointment with the [facility] ([facility]) on 05/04/17.                  Your next 10 appointments already scheduled     May 04, 2017  2:30 PM CDT   (Arrive by 2:15 PM)   New Patient Visit with Mike Enamor[facility] ([facility])    [address] [place] [address] [phone]              Additional Services     Home care nursing referral       _______________________  [facility]  Phone  [phone]  Fax  [phone]  ______________________    Resumption of Home Care Services  RN skilled nursing visit. RN to assess vital signs and weight, respiratory and cardiac status, pain level and activity tolerance, incision for signs/symptoms of infection, hydration, nutrition and bowel status and home safety.  RN to teach medication management.  RN to provide tube site care and management.  RN to provide INR checks.  Patient will need first INR drawn 5/1.  Report INR levels to the [facility] at Phone: [phone] Fax: [phone]    PT/OT to eval and treat    Your provider has ordered home care nursing services. If you have not been contacted within 2 days of your  "discharge please call the inpatient department phone number at [phone] .            Home infusion referral       Your provider has referred you to: [facility]: [facility] [place] [phone]   [email or web address]    Local Address (if different from home address): N/A    Anticipated Length of Therapy: Per MD Order  Enteral Nutrition via J tube  2 cartons TwoCal HN + 2 carton isosource 1.5 via Deny Pump  500ml-600ml water daily      Home Infusion Pharmacist to adjust therapy based on labs and clinical assessments: Yes    Labs:  May draw labs from Venous Catheter: Yes  Home Infusion Pharmacist to order labs based on therapy type and clinical assessments: Yes    Agency Staff to assess nursing needs for Infusion Therapy.                  Future tests that were ordered for you     XR Chest 2 Views           INR                 Pending Results     Date and Time Order Name Status Description    4/23/2017 1117 Anaerobic catheter tip Preliminary             Statement of Approval     Ordered          04/28/17 1202  I have reviewed and agree with all the recommendations and orders detailed in this document.  EFFECTIVE NOW     Approved and electronically signed by:  Saul Rogers PA-C             Admission Information     Date & Time Provider Department Dept. Phone    4/17/2017 Andrea Knapp MD [address] [facility] [phone]      Your Vitals Were     Blood Pressure Pulse Temperature Respirations Height Weight    121/59 (BP Location: Left arm) 87 96.5  F (35.8  C) (Oral) 18 1.524 m (5') 45.8 kg (100 lb 14.4 oz)    Pulse Oximetry BMI (Body Mass Index)                97% 19.71 kg/m2          [facility] Information     [employer] lets you send messages to your doctor, view your test results, renew your prescriptions, schedule appointments and more. To sign up, go to [email or web address] . Click on \"Log in\" on the left side of the screen, which will take you " "to the Welcome page. Then click on \"Sign up Now\" on the right side of the page.     You will be asked to enter the access code listed below, as well as some personal information. Please follow the directions to create your username and password.     Your access code is: [email or web address]  Expires: 2017  7:30 AM     Your access code will  in 90 days. If you need help or a new code, please call your [place] clinic or [phone].        Care EveryWhere ID     This is your Care EveryWhere ID. This could be used by other organizations to access your [place] medical records  [id number]           Review of your medicines      START taking        Dose / Directions    enoxaparin 60 MG/0.6ML injection   Commonly known as:  LOVENOX   Used for:  Paroxysmal atrial fibrillation (H)        Dose:  1 mg/kg   Inject 0.48 mLs (48 mg) Subcutaneous every 12 hours for 7 days   Quantity:  6.72 mL   Refills:  0       fiber modular packet        Dose:  1 packet   1 packet by Per Feeding Tube route 2 times daily   Quantity:  60 packet   Refills:  0       gabapentin 250 MG/5ML solution   Commonly known as:  NEURONTIN   Used for:  Acute post-operative pain        Dose:  300 mg   Take 6 mLs (300 mg) by mouth every 8 hours   Quantity:  450 mL   Refills:  0       hyoscyamine 0.125 MG tablet   Commonly known as:  ANASPAZ/LEVSIN   Used for:  Abdominal cramping        Dose:  125 mcg   Take 1 tablet (125 mcg) by mouth 3 times daily   Quantity:  90 tablet   Refills:  0       lidocaine 5 % Patch   Commonly known as:  LIDODERM   Used for:  Acute post-operative pain        Dose:  1 patch   Place 1 patch onto the skin every 24 hours Apply to point of maximal pain   Quantity:  30 patch   Refills:  0       methocarbamol 500 MG tablet   Commonly known as:  ROBAXIN   Used for:  Acute post-operative pain        Dose:  500 mg   Take 1 tablet (500 mg) by mouth 4 times daily   Quantity:  120 tablet   Refills:  0       opium tincture 10 mg/mL (1%) liquid "   Used for:  Bowel habit changes        Dose:  0.6 mL   Take 0.6 mLs (6 mg) by mouth every 6 hours   Quantity:  72 mL   Refills:  0       * order for DME        Equipment being ordered:  [facility] [place] [facility] Suction Machine-Intermittent Suction Canisters(2) Suction Canister Holders(2) Suction Connect Tube(2) 5 in 1 Connector(2) Bacteria Filter(2) Yaunkauer Suction(2)  Treatment Diagnosis: Esophogeal Perforation, s/p esophagectomy   Quantity:  1 Device   Refills:  0       * order for DME   Used for:  Esophageal perforation        Equipment being ordered:  Suction Pump Suction Canister(2) Suction Tubing(2) Bacteria Filter(2) Yaunkauer(4) Red Rubber Catheter(2)  Treatment Diagnosis: Esophogeal Perforation, s/p esophagectomy   Quantity:  1 Device   Refills:  0       ranitidine 150 MG/10ML syrup   Commonly known as:  Zantac        Dose:  150 mg   Take 10 mLs (150 mg) by mouth 2 times daily   Quantity:  600 mL   Refills:  0       sennosides 1.76 mg/mL syrup   Commonly known as:  SENOKOT   Used for:  Bowel habit changes        Dose:  5 mL   5 mLs by Per J Tube route 2 times daily as needed for constipation   Quantity:  105 mL   Refills:  0       simethicone 40 MG/0.6ML suspension   Commonly known as:  MYLICON   Used for:  Abdominal cramping        Dose:  40 mg   0.6 mLs (40 mg) by Per Feeding Tube route every 6 hours as needed for cramping   Quantity:  30 mL   Refills:  0       * Notice:  This list has 2 medication(s) that are the same as other medications prescribed for you. Read the directions carefully, and ask your doctor or other care provider to review them with you.      CONTINUE these medicines which may have CHANGED, or have new prescriptions. If we are uncertain of the size of tablets/capsules you have at home, strength may be listed as something that might have changed.        Dose / Directions    acetaminophen 32 mg/mL solution   Commonly known as:  TYLENOL   This may have changed:    - medication strength  - how much to  take  - when to take this  - reasons to take this   Used for:  Acute post-operative pain        Dose:  975 mg   Take 30.45 mLs (975 mg) by mouth every 8 hours for 15 days   Quantity:  1370.25 mL   Refills:  0       loperamide 1 mg/5 mL liquid   Commonly known as:  IMODIUM   This may have changed:  when to take this   Used for:  Bowel habit changes        Dose:  4 mg   Take 20 mLs (4 mg) by mouth 4 times daily as needed for diarrhea   Quantity:  200 mL   Refills:  0       metoprolol 50 MG tablet   Commonly known as:  LOPRESSOR   This may have changed:    - how much to take  - how to take this  - when to take this  - additional instructions   Used for:  Essential hypertension        50 mg BID.  May crush, mix with water and administer via feeding tube   Quantity:  60 tablet   Refills:  3       oxyCODONE 5 MG/5ML solution   Commonly known as:  ROXICODONE   This may have changed:    - medication strength  - when to take this  - reasons to take this   Used for:  Acute post-operative pain        Dose:  5-10 mg   Take 5-10 mLs (5-10 mg) by mouth every 4 hours as needed for moderate to severe pain   Quantity:  1000 mL   Refills:  0       traZODone 100 MG tablet   Commonly known as:  DESYREL   This may have changed:  how much to take   Used for:  Insomnia, unspecified type        Dose:  50 mg   0.5 tablets (50 mg) by Per G Tube route At Bedtime   Quantity:  30 tablet   Refills:  0       warfarin 2.5 MG tablet   Commonly known as:  COUMADIN   This may have changed:    - how much to take  - how to take this  - when to take this  - additional instructions   Used for:  Paroxysmal atrial fibrillation (H)        Dose:  2.5 mg   Take 1 tablet (2.5 mg) by mouth daily   Quantity:  30 tablet   Refills:  1         CONTINUE these medicines which have NOT CHANGED        Dose / Directions    BENADRYL PO        Dose:  25 mg   Take 25 mg by mouth nightly as needed   Refills:  0       cholestyramine 4 G Packet   Commonly known as:  ANDREW         Dose:  1 packet   Take 1 packet by mouth daily   Refills:  0       CULTURELLE PO        Dose:  1 tablet   Take 1 tablet by mouth 2 times daily   Refills:  0       doxepin 3 MG tablet   Commonly known as:  SILENOR   Used for:  Insomnia due to medical condition        Dose:  3 mg   Take 1 tablet (3 mg) by mouth nightly as needed for sleep   Quantity:  5 tablet   Refills:  0       multivitamins with minerals Liqd liquid        Dose:  15 mL   Take 15 mLs by mouth daily   Refills:  0       nystatin Powd   Used for:  Yeast infection of the skin        Apply to peristomal skin with each pouch change until rash is resolved   Quantity:  1 each   Refills:  1         STOP taking     Zinc Sulfate 220 (50 ZN) MG Tabs                Where to get your medicines      These medications were sent to [facility] [address] [place] [facility] [place] [place] - [address] [place] [address]  [address], [place] [address]     Phone:  [phone]     warfarin 2.5 MG tablet         These medications were sent to [facility] [address] [facility] [place] [facility]  [address] [facility] [address]     Phone:  [phone]     acetaminophen 32 mg/mL solution    enoxaparin 60 MG/0.6ML injection    fiber modular packet    gabapentin 250 MG/5ML solution    hyoscyamine 0.125 MG tablet    lidocaine 5 % Patch    methocarbamol 500 MG tablet    ranitidine 150 MG/10ML syrup    sennosides 1.76 mg/mL syrup    simethicone 40 MG/0.6ML suspension    traZODone 100 MG tablet         Some of these will need a paper prescription and others can be bought over the counter. Ask your nurse if you have questions.     Bring a paper prescription for each of these medications     opium tincture 10 mg/mL (1%) liquid    order for DME    order for DME    oxyCODONE 5 MG/5ML solution                Protect others around you: Learn how to safely use, store and throw away your medicines at www.disposemymeds.org.             Medication List: This is a list of all  your medications and when to take them. Check marks below indicate your daily home schedule. Keep this list as a reference.      Medications           Morning Afternoon Evening Bedtime As Needed    acetaminophen 32 mg/mL solution   Commonly known as:  TYLENOL   Take 30.45 mLs (975 mg) by mouth every 8 hours for 15 days   Last time this was given:  975 mg on 4/28/2017  4:19 AM                                BENADRYL PO   Take 25 mg by mouth nightly as needed                                cholestyramine 4 G Packet   Commonly known as:  QUESTRAN   Take 1 packet by mouth daily                                CULTURELLE PO   Take 1 tablet by mouth 2 times daily                                doxepin 3 MG tablet   Commonly known as:  SILENOR   Take 1 tablet (3 mg) by mouth nightly as needed for sleep                                enoxaparin 60 MG/0.6ML injection   Commonly known as:  LOVENOX   Inject 0.48 mLs (48 mg) Subcutaneous every 12 hours for 7 days   Last time this was given:  50 mg on 4/28/2017  8:01 AM                                fiber modular packet   1 packet by Per Feeding Tube route 2 times daily   Last time this was given:  1 packet on 4/28/2017  8:04 AM                                gabapentin 250 MG/5ML solution   Commonly known as:  NEURONTIN   Take 6 mLs (300 mg) by mouth every 8 hours   Last time this was given:  300 mg on 4/28/2017  6:46 AM                                hyoscyamine 0.125 MG tablet   Commonly known as:  ANASPAZ/LEVSIN   Take 1 tablet (125 mcg) by mouth 3 times daily   Last time this was given:  125 mcg on 4/28/2017  8:01 AM                                lidocaine 5 % Patch   Commonly known as:  LIDODERM   Place 1 patch onto the skin every 24 hours Apply to point of maximal pain   Last time this was given:  2 patches on 4/27/2017  8:44 PM                                loperamide 1 mg/5 mL liquid   Commonly known as:  IMODIUM   Take 20 mLs (4 mg) by mouth 4 times daily as needed  for diarrhea   Last time this was given:  2 mg on 4/28/2017  8:02 AM                                methocarbamol 500 MG tablet   Commonly known as:  ROBAXIN   Take 1 tablet (500 mg) by mouth 4 times daily   Last time this was given:  500 mg on 4/28/2017  8:01 AM                                metoprolol 50 MG tablet   Commonly known as:  LOPRESSOR   50 mg BID.  May crush, mix with water and administer via feeding tube                                multivitamins with minerals Liqd liquid   Take 15 mLs by mouth daily   Last time this was given:  15 mLs on 4/28/2017  8:03 AM                                nystatin Powd   Apply to peristomal skin with each pouch change until rash is resolved                                opium tincture 10 mg/mL (1%) liquid   Take 0.6 mLs (6 mg) by mouth every 6 hours   Last time this was given:  6 mg on 4/28/2017 10:29 AM                                * order for DME   Equipment being ordered:  [facility] Suction Machine-Intermittent Suction Canisters(2) Suction Canister Holders(2) Suction Connect Tube(2) 5 in 1 Connector(2) Bacteria Filter(2) Yaunkauer Suction(2)  Treatment Diagnosis: Esophogeal Perforation, s/p esophagectomy                                * order for DME   Equipment being ordered:  Suction Pump Suction Canister(2) Suction Tubing(2) Bacteria Filter(2) Yaunkauer(4) Red Rubber Catheter(2)  Treatment Diagnosis: Esophogeal Perforation, s/p esophagectomy                                oxyCODONE 5 MG/5ML solution   Commonly known as:  ROXICODONE   Take 5-10 mLs (5-10 mg) by mouth every 4 hours as needed for moderate to severe pain   Last time this was given:  10 mg on 4/28/2017 10:41 AM                                ranitidine 150 MG/10ML syrup   Commonly known as:  Zantac   Take 10 mLs (150 mg) by mouth 2 times daily   Last time this was given:  150 mg on 4/28/2017  8:02 AM                                sennosides 1.76 mg/mL syrup   Commonly known as:  SENOKOT   5 mLs by  Per J Tube route 2 times daily as needed for constipation   Last time this was given:  5 mLs on 4/24/2017  8:39 AM                                simethicone 40 MG/0.6ML suspension   Commonly known as:  MYLICON   0.6 mLs (40 mg) by Per Feeding Tube route every 6 hours as needed for cramping   Last time this was given:  40 mg on 4/27/2017 10:37 AM                                traZODone 100 MG tablet   Commonly known as:  DESYREL   0.5 tablets (50 mg) by Per G Tube route At Bedtime                                warfarin 2.5 MG tablet   Commonly known as:  COUMADIN   Take 1 tablet (2.5 mg) by mouth daily                                * Notice:  This list has 2 medication(s) that are the same as other medications prescribed for you. Read the directions carefully, and ask your doctor or other care provider to review them with you.

## 2017-04-17 NOTE — H&P
SURGICAL ICU ADMISSION NOTE  4/17/2017    PRIMARY TEAM: Thoracic   PRIMARY PHYSICIAN: OLIMPIA Wise MD    REASON FOR CRITICAL CARE ADMISSION: Close observation post op   ADMITTING PHYSICIAN: OLIMPIA Knapp MD    ASSESSMENT:  Minerva Blanco is a 72 year old female with a history of a fib on coumadin (now held), breast cancer s/p breast conservation therapy, fibromyalgia, MS, hiatal hernia and GERD s/p Toupet fundoplication 1/2016 with a post operative course that was complicated by esophageal perforation with mediastinal phlegmon, and was taken back for proximal gastrectomy, distal esophagectomy, spit fistula creation, and left thoracotomy with mediastinal phlegmon excision on 1/9/2017. Neck Dissection, Spit Fistula Takedown, Laparoscopic Jejunostomy Tube and Pharyngostomy Tube, Gastric Pull up, Upper Endoscopy(EGD) 4/17/17.    PLAN:   Neuro/ pain/ sedation:  - Hydromorphone PCA for pain.  - IV acetaminophen  - Pain team following  - May need additional pain control as she takes oxycodone at home chronically.  - Home trazodone and doxepin on hold     Pulmonary care:   -Supplemental oxygen to keep saturation above 92 %.  -Capnography.  -Incentive spirometer every 15- 30 minutes when awake.  -Right chest tube to suction     Cardiovascular:     - A fib on home metoprolol. Resume metoprolol at 5mg IV q6hr with hold parameters.  - Keep MAPs above 60     GI/Nutrition:   -NPO for now  -J to gravity  -Pharyngostomy to LIS     Renal/Fluids/ Electrolytes:   -Monitor I&Os closely.  -Cr at baseline of 0.42  -ICU electrolyte replacement protocol     Endocrine:    -SSI     ID/ Antibiotics:  -No indication for antibiotics.      Heme:     -Hemoglobin stable post op at 9.0.  -Coumadin for afib at home. Now held. No anticoagulation tonight.     Prophylaxis:    -Mechanical prophylaxis for DVT.   -No chemical DVT prophylaxis due to high risk of bleeding.      MSK:    -PT and OT consulted. Appreciate recs.     Lines/ tubes/ drains:  -  Pharyngostomy, CT, right central line     Disposition:  -Surgical ICU.     Patient discussed with Surgical ICU staff.    Arslan Wisdom, PGY2  [phone]     - - - - - - - - - - - - - - - - - - - - - - - - - - - - - - - - - - - - - - - - - - - - - - - - - - - - - - - - - - - - - - - - - - - - - - - -     HPI:  Minerva Blanco is a 72 year old female with a history of a fib on coumadin (now held), breast cancer s/p breast conservation therapy, fibromyalgia, MS, hiatal hernia and GERD s/p Toupet fundoplication 1/2016 with a post operative course that was complicated by esophageal perforation with mediastinal phlegmon and was taken back for proximal gastrectomy, distal esophagectomy, spit fistula creation, and left thoracotomy with mediastinal phlegmon excision on 1/9/2017. She had undergone multiple procedures for management (including esophageal dilation, stent placement/removal, washouts) prior to this.     She was admitted last night and today underwent Neck Dissection, Spit Fistula Takedown, Laparoscopic Jejunostomy Tube and Pharyngostomy Tube, Gastric Pull up, Upper Endoscopy(EGD), Flexible Bronchoscopy, and Sternotomy by Dr Wise. EBL was 300. She is admitted to SICU for close observation post op. Right chest tube is to suction as there may an air leak.     REVIEW OF SYSTEMS:   10 point ROS was negative except for items mentioned in HPI.    PAST MEDICAL HISTORY:    has a past medical history of Atrial fibrillation (H); Breast cancer (H) (2007); Esophageal perforation; Fibromyalgia; GERD (gastroesophageal reflux disease); Hiatal hernia; History of blood transfusion; IBS (irritable bowel syndrome); Meniere's disease; and MS (multiple sclerosis) (H). She also has no past medical history of PONV (postoperative nausea and vomiting).    SURGICAL HISTORY:    has a past surgical history that includes Esophagoscopy, gastroscopy, duodenoscopy (EGD), combined (N/A, 4/18/2016); Pharyngostomy (N/A, 4/18/2016);  Esophagoscopy, gastroscopy, duodenoscopy (EGD), combined (N/A, 4/25/2016); Pharyngostomy (N/A, 4/25/2016); appendectomy; Thoracotomy (Right, 1998); back surgery (5/1/2015); Wrist surgery; Nissen fundoplication (1/2016); GASTROSTOMY/JEJUN TUBE; Esophagoscopy, gastroscopy, duodenoscopy (EGD), combined (N/A, 5/4/2016); Pharyngostomy (N/A, 5/4/2016); Esophagoscopy, gastroscopy, duodenoscopy (EGD), combined (N/A, 5/18/2016); Esophagoscopy, gastroscopy, duodenoscopy (EGD), combined (N/A, 6/22/2016); Pharyngostomy (N/A, 6/22/2016); Esophagoscopy, gastroscopy, duodenoscopy (EGD), dilatation, combined (N/A, 6/29/2016); Irrigation and debridement chest washout, combined (N/A, 6/29/2016); Pharyngostomy (N/A, 6/29/2016); UGI ENDOSCOPY W TRANSENDOSCOPIC STENT PLACEMENT (N/A, 7/12/2016); Esophagoscopy, gastroscopy, duodenoscopy (EGD), combined (N/A, 7/12/2016); UGI ENDOSCOPY W TRANSENDOSCOPIC STENT PLACEMENT (N/A, 7/22/2016); picc insertion (Left, 8/25/2016); Combined Esophagoscopy, Gastroscopy, Duodenoscopy (EGD), Remove Esophageal Stent (N/A, 8/26/2016); Nerve block peripheral (N/A, 8/30/2016); Lumpectomy breast (Right, 2007); Laparoscopy diagnostic (general) (N/A, 1/9/2017); Thoracotomy (Left, 1/9/2017); Esophagectomy (N/A, 1/9/2017); and Create spit fistula (N/A, 1/9/2017).    SOCIAL HISTORY:    reports that she quit smoking about 19 years ago. Her smoking use included Cigarettes. She has a 15.00 pack-year smoking history. She does not have any smokeless tobacco history on file. She reports that she does not drink alcohol or use illicit drugs.    FAMILY HISTORY:  No bleeding/clotting disorders.    ALLERGIES:      Allergies   Allergen Reactions     Amoxicillin Diarrhea     Ativan [Lorazepam] Other (See Comments)     Hallucinations     Hydromorphone Itching     4/12/17 - patient open to using this as she tolerated Hydromorphone PCA during hospitalization in January 2017.      Morphine Itching       MEDICATIONS:    Current  Facility-Administered Medications on File Prior to Encounter:  bupivacaine 0.25 % - EPINEPHrine 1:200,000 injection     Current Outpatient Prescriptions on File Prior to Encounter:  nystatin POWD Apply to peristomal skin with each pouch change until rash is resolved   metoprolol (LOPRESSOR) 50 MG tablet 50 mg BID.  May crush, mix with water and administer via feeding tube (Patient taking differently: 25 mg by Per G Tube route 2 times daily May crush, mix with water and administer via feeding tube)   Zinc Sulfate 220 (50 ZN) MG TABS 220 mg daily.  OK to crush, mix with water and administer via feeding tube.   traZODone (DESYREL) 100 MG tablet 1 tablet (100 mg) by Per G Tube route At Bedtime   loperamide (IMODIUM) 1 MG/5ML liquid Take 20 mLs (4 mg) by mouth 4 times daily as needed for diarrhea (Patient taking differently: Take 4 mg by mouth 2 times daily )   Lactobacillus Rhamnosus, GG, (CULTURELLE PO) Take 1 tablet by mouth 2 times daily    warfarin (COUMADIN) 2.5 MG tablet Take 1 tablet (2.5 mg) by mouth daily (Patient taking differently: Take 1.25 mg by mouth on Wednesday and take 2.5 mg by mouth on all other days of the week.)       PHYSICAL EXAMINATION:  Temp:  [96.8  F (36  C)-98.2  F (36.8  C)] 97  F (36.1  C)  Heart Rate:  [58-72] 72  Resp:  [15-16] 16  BP: ()/(56-68) 94/68  MAP:  [68 mmHg-81 mmHg] 81 mmHg  Arterial Line BP: (101-117)/(47-57) 117/57  SpO2:  [98 %-100 %] 100 %    General: Mild to moderate distress from pain.   HEENT: left neck incision c/d/i. Left pharyngostomy tube in place.   Pulm: Non-labored breathing, satting well on NC oxygen, chest tube with airleak to suction  Chest: spit fistula site packed.   Abd: soft; NT, ND, lap incisions with intact dressing  Extremities: no edema  Skin: warm and well-perfused.     LABS: Reviewed.   Arterial Blood Gases     Recent Labs  Lab 04/17/17  1306 04/17/17  1125   PH 7.36 7.37   PCO2 46* 43   PO2 192* 246*   HCO3 26 25     Complete Blood Count      Recent Labs  Lab 04/17/17  1702 04/17/17  1306 04/17/17  1125 04/17/17  0653 04/14/17  0821   WBC 10.5  --   --   --  7.9   HGB 9.0* 9.5* 9.1* 11.2* 11.9     --   --   --  313     Basic Metabolic Panel    Recent Labs  Lab 04/17/17  1702 04/17/17  1306 04/17/17  1125 04/17/17  0653 04/14/17  0821    136 136 133 138   POTASSIUM 3.4 2.9* 3.1* 3.6 4.0   CHLORIDE 107  --   --  97 100   CO2 23  --   --  31 32   BUN 15  --   --  16 33*   CR 0.42*  --   --  0.52 0.59   * 149* 150* 74 84     Liver Function Tests    Recent Labs  Lab 04/17/17 1702 04/17/17  0653 04/14/17  0821   AST  --  38  --    ALT  --  56*  --    ALKPHOS  --  283*  --    BILITOTAL  --  0.8  --    ALBUMIN  --  3.6  --    INR 1.15* 1.04 1.07     Pancreatic Enzymes  No lab results found in last 7 days.  Coagulation Profile    Recent Labs  Lab 04/17/17 1702 04/17/17  0653 04/14/17  0821   INR 1.15* 1.04 1.07     Lactate  Invalid input(s): LACTATE    IMAGING:  Recent Results (from the past 24 hour(s))   XR Chest Port 1 View    Narrative    Resident preliminary report:   1. New right IJ central venous catheter with tip over the mid SVC and  tracheostomy tube with tip over the right upper quadrant.  2. New right chest tube with associated hazy opacities in the right  midlung.  3. Left greater than right pleural effusions.

## 2017-04-17 NOTE — IP AVS SNAPSHOT
UNIT 7B [facility]: [phone]                                              INTERAGENCY TRANSFER FORM - PHYSICIAN ORDERS   2017                    Hospital Admission Date: 2017  FAVIAN MALONE   : 1945  Sex: Female        Attending Provider: Andrea Knapp MD     Allergies:  Amoxicillin, Ativan [Lorazepam], Hydromorphone, Morphine    Infection:  None   Service:  THORACIC QUEENIE    Ht:  1.524 m (5')   Wt:  45.8 kg (100 lb 14.4 oz)   Admission Wt:  40.8 kg (89 lb 15.2 oz)    BMI:  19.71 kg/m 2   BSA:  1.39 m 2            Patient PCP Information     Provider PCP Type    Andrea Nino MD General      ED Clinical Impression     Diagnosis Description Comment Added By Time Added    Esophageal perforation [K22.3] Esophageal perforation [K22.3]  Khushi Garza, RN 2017  6:22 AM    On warfarin therapy [Z79.01] On warfarin therapy [Z79.01]  Khushi Garza RN 2017  6:22 AM    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]  Sarina Orellana, RN 2017 10:51 AM    Acute post-operative pain [G89.18] Acute post-operative pain [G89.18]  Saul Rogers PA-C 2017  9:22 AM    Paroxysmal atrial fibrillation (H) [I48.0] Paroxysmal atrial fibrillation (H) [I48.0]  Saul Rogers PA-C 2017  9:28 AM    History of esophagectomy [Z90.49] History of esophagectomy [Z90.49]  Saul Rogers PA-C 2017  9:29 AM    Abdominal cramping [R10.9] Abdominal cramping [R10.9]  Saul Rogers PA-C 2017  9:30 AM    Bowel habit changes [R19.4] Bowel habit changes [R19.4]  Saul Rogers PA-C 2017 10:39 AM    Insomnia, unspecified type [G47.00] Insomnia, unspecified type [G47.00]  Saul Rogers PA-C 2017 10:41 AM      Hospital Problems as of 2017              Priority Class Noted POA    Hx of esophagectomy Medium  2017 Yes    History of esophagectomy Medium  2017 Yes      Non-Hospital Problems as of 2017               Priority Class Noted    Abscess Medium  4/16/2016    Esophageal perforation Medium  4/18/2016    Esophageal stricture Medium  8/25/2016    Mediastinitis Medium  8/28/2016    Gastroesophageal reflux disease, esophagitis presence not specified Medium  10/21/2016    Paroxysmal atrial fibrillation (H) Medium  2/21/2017    Long-term (current) use of anticoagulants [Z79.01] Medium  2/22/2017    Atrial fibrillation (H) [I48.91] Medium  2/22/2017      Code Status History     Date Active Date Inactive Code Status Order ID Comments User Context    9/5/2016  5:48 AM 4/17/2017  7:41 PM Full Code [id number]  Janae Montes MD Outpatient    8/27/2016 10:35 AM 9/5/2016  5:48 AM Full Code [id number]  Jalen Mcadams MD Outpatient    8/25/2016  5:19 PM 8/27/2016 10:35 AM Full Code [id number]  Jalen Mcadams MD Inpatient    7/12/2016  3:51 PM 7/13/2016  4:37 PM Full Code [id number]  Justice Hill MD Inpatient    5/18/2016 10:47 AM 7/12/2016  3:51 PM Full Code [id number]  Anant Melendrez MD Outpatient    4/27/2016  8:07 AM 5/18/2016 10:47 AM Full Code [id number]  Blas Rubio MD Outpatient    4/18/2016  6:38 PM 4/27/2016  8:07 AM Full Code [id number]  Blas Rubio MD Inpatient    4/16/2016  1:19 AM 4/18/2016  6:38 PM Full Code [id number]  Faisal Dolan MD Inpatient         Medication Review      START taking        Dose / Directions Comments    enoxaparin 60 MG/0.6ML injection   Commonly known as:  LOVENOX   Used for:  Paroxysmal atrial fibrillation (H)        Dose:  1 mg/kg   Inject 0.48 mLs (48 mg) Subcutaneous every 12 hours for 7 days   Quantity:  6.72 mL   Refills:  0        fiber modular packet        Dose:  1 packet   1 packet by Per Feeding Tube route 2 times daily   Quantity:  60 packet   Refills:  0        gabapentin 250 MG/5ML solution   Commonly known as:  NEURONTIN   Used for:  Acute post-operative pain        Dose:  300 mg   Take 6 mLs (300 mg) by mouth every 8 hours   Quantity:  450 mL    Refills:  0        hyoscyamine 0.125 MG tablet   Commonly known as:  ANASPAZ/LEVSIN   Used for:  Abdominal cramping        Dose:  125 mcg   Take 1 tablet (125 mcg) by mouth 3 times daily   Quantity:  90 tablet   Refills:  0        lidocaine 5 % Patch   Commonly known as:  LIDODERM   Used for:  Acute post-operative pain        Dose:  1 patch   Place 1 patch onto the skin every 24 hours Apply to point of maximal pain   Quantity:  30 patch   Refills:  0        methocarbamol 500 MG tablet   Commonly known as:  ROBAXIN   Used for:  Acute post-operative pain        Dose:  500 mg   Take 1 tablet (500 mg) by mouth 4 times daily   Quantity:  120 tablet   Refills:  0        opium tincture 10 mg/mL (1%) liquid   Used for:  Bowel habit changes        Dose:  0.6 mL   Take 0.6 mLs (6 mg) by mouth every 6 hours   Quantity:  72 mL   Refills:  0        * order for DME        Equipment being ordered:  [facility] Suction Machine-Intermittent Suction Canisters(2) Suction Canister Holders(2) Suction Connect Tube(2) 5 in 1 Connector(2) Bacteria Filter(2) Yaunkauer Suction(2)  Treatment Diagnosis: Esophogeal Perforation, s/p esophagectomy   Quantity:  1 Device   Refills:  0        * order for DME   Used for:  Esophageal perforation        Equipment being ordered:  Suction Pump Suction Canister(2) Suction Tubing(2) Bacteria Filter(2) Yaunkauer(4) Red Rubber Catheter(2)  Treatment Diagnosis: Esophogeal Perforation, s/p esophagectomy   Quantity:  1 Device   Refills:  0        ranitidine 150 MG/10ML syrup   Commonly known as:  Zantac        Dose:  150 mg   Take 10 mLs (150 mg) by mouth 2 times daily   Quantity:  600 mL   Refills:  0        sennosides 1.76 mg/mL syrup   Commonly known as:  SENOKOT   Used for:  Bowel habit changes        Dose:  5 mL   5 mLs by Per J Tube route 2 times daily as needed for constipation   Quantity:  105 mL   Refills:  0    Hold for loose stools.       simethicone 40 MG/0.6ML suspension   Commonly known as:  MYLICON    Used for:  Abdominal cramping        Dose:  40 mg   0.6 mLs (40 mg) by Per Feeding Tube route every 6 hours as needed for cramping   Quantity:  30 mL   Refills:  0        * Notice:  This list has 2 medication(s) that are the same as other medications prescribed for you. Read the directions carefully, and ask your doctor or other care provider to review them with you.      CONTINUE these medications which may have CHANGED, or have new prescriptions. If we are uncertain of the size of tablets/capsules you have at home, strength may be listed as something that might have changed.        Dose / Directions Comments    acetaminophen 32 mg/mL solution   Commonly known as:  TYLENOL   This may have changed:    - medication strength  - how much to take  - when to take this  - reasons to take this   Used for:  Acute post-operative pain        Dose:  975 mg   Take 30.45 mLs (975 mg) by mouth every 8 hours for 15 days   Quantity:  1370.25 mL   Refills:  0        loperamide 1 mg/5 mL liquid   Commonly known as:  IMODIUM   This may have changed:  when to take this   Used for:  Bowel habit changes        Dose:  4 mg   Take 20 mLs (4 mg) by mouth 4 times daily as needed for diarrhea   Quantity:  200 mL   Refills:  0        metoprolol 50 MG tablet   Commonly known as:  LOPRESSOR   This may have changed:    - how much to take  - how to take this  - when to take this  - additional instructions   Used for:  Essential hypertension        50 mg BID.  May crush, mix with water and administer via feeding tube   Quantity:  60 tablet   Refills:  3        oxyCODONE 5 MG/5ML solution   Commonly known as:  ROXICODONE   This may have changed:    - medication strength  - when to take this  - reasons to take this   Used for:  Acute post-operative pain        Dose:  5-10 mg   Take 5-10 mLs (5-10 mg) by mouth every 4 hours as needed for moderate to severe pain   Quantity:  1000 mL   Refills:  0        traZODone 100 MG tablet   Commonly known as:   DESYREL   This may have changed:  how much to take   Used for:  Insomnia, unspecified type        Dose:  50 mg   0.5 tablets (50 mg) by Per G Tube route At Bedtime   Quantity:  30 tablet   Refills:  0        warfarin 2.5 MG tablet   Commonly known as:  COUMADIN   This may have changed:    - how much to take  - how to take this  - when to take this  - additional instructions   Used for:  Paroxysmal atrial fibrillation (H)        Dose:  2.5 mg   Take 1 tablet (2.5 mg) by mouth daily   Quantity:  30 tablet   Refills:  1          CONTINUE these medications which have NOT CHANGED        Dose / Directions Comments    BENADRYL PO        Dose:  25 mg   Take 25 mg by mouth nightly as needed   Refills:  0        cholestyramine 4 G Packet   Commonly known as:  QUESTRAN        Dose:  1 packet   Take 1 packet by mouth daily   Refills:  0        CULTURELLE PO        Dose:  1 tablet   Take 1 tablet by mouth 2 times daily   Refills:  0        doxepin 3 MG tablet   Commonly known as:  SILENOR   Used for:  Insomnia due to medical condition        Dose:  3 mg   Take 1 tablet (3 mg) by mouth nightly as needed for sleep   Quantity:  5 tablet   Refills:  0        multivitamins with minerals Liqd liquid        Dose:  15 mL   Take 15 mLs by mouth daily   Refills:  0        nystatin Powd   Used for:  Yeast infection of the skin        Apply to peristomal skin with each pouch change until rash is resolved   Quantity:  1 each   Refills:  1          STOP taking     Zinc Sulfate 220 (50 ZN) MG Tabs                   After Care     Discharge Instructions       DIET: Clear liquid diet for the next 5 days, then full liquid diet for one week, then soft mechanical diet until follow up.  With the soft diet chew very well and take 4-5 small meals per day, or more if necessary.  Do not eat significant amounts after 6pm to minimize reflux at night.    You will continue tube feeds until follow up, so food by mouth is primarily for pleasure.  Transition  to each new diet stage and introduce new foods slowly to  how well you tolerate them.    Follow instructions provided by the dietician and the speech therapist    Sleep with the head of your bed elevated to help prevent reflux, which is common after esophagectomy    If your travel plans upon discharge include prolonged periods of sitting (a lengthy car or plane ride), it is highly beneficial to get up and walk at least once per hour to help prevent swelling and blood clots.     You may remove chest tube dressing 48 hours after tube removal and bandage the site at your own discretion thereafter.  Small amounts of leakage are normal for 2-3 days after removal.  Feel free to call with questions.    You may get incision wet 2 days after operation. Do not submerge, soak, or scrub incision or swim until seen in follow-up.    Take incentive spirometer home for continued frequent use    Activity as tolerated, no strenous activity until seen in follow-up, no lifting greater than 10 pounds for the next 2-4 weeks.    Stay hydrated. Take over the counter fiber (metamucil or benefiber) and stool softeners (Miralax, docusate or senna) if becoming constipated.     Call for fever greater than 101.5, chills, increased size of incision, red skin around incision, vision changes, muscle strength changes, sensation changes, shortness of breath, or other concerns.    No driving while taking narcotic pain medication.    Transition to ibuprofen or tylenol/acetaminophen for pain control. Do not take tylenol/acetaminophen and acetaminophen containing narcotic (e.g., percocet or vicodin) at the same time. If you have known ulcer problems, or kidney trouble (elevated creatinine) do not take the ibuprofen.    In emergencies, call 911    For other Questions or Concerns;   A.) During weekday working hours (Monday through Friday 8am to 4:30pm)   call [phone] and ask to speak to a clinical nurse specialist.     B.) At nights (after  4:30pm), on weekends, or if urgent call [phone] and   tell the   I would like to page job code [phone], the thoracic surgery   fellow on call, please.        Wound care and dressings       Daily wet to dry dressing change to spit fistula site             Referrals     Home care nursing referral       _______________________  [facility]  Phone  [phone]  Fax  [phone]  ______________________    Resumption of Home Care Services  RN skilled nursing visit. RN to assess vital signs and weight, respiratory and cardiac status, pain level and activity tolerance, incision for signs/symptoms of infection, hydration, nutrition and bowel status and home safety.  RN to teach medication management.  RN to provide tube site care and management.  RN to provide INR checks.  Patient will need first INR drawn 5/1.  Report INR levels to the [facility] at Phone: [phone] Fax: [phone]    PT/OT to eval and treat    Your provider has ordered home care nursing services. If you have not been contacted within 2 days of your discharge please call the inpatient department phone number at [phone] .       Home infusion referral       Your provider has referred you to: [facility]: [facility] [place] [phone]   [email or web address]    Local Address (if different from home address): N/A    Anticipated Length of Therapy: Per MD Order  Enteral Nutrition via J tube  2 cartons TwoCal HN + 2 carton isosource 1.5 via Deny Pump  500ml-600ml water daily      Home Infusion Pharmacist to adjust therapy based on labs and clinical assessments: Yes    Labs:  May draw labs from Venous Catheter: Yes  Home Infusion Pharmacist to order labs based on therapy type and clinical assessments: Yes    Agency Staff to assess nursing needs for Infusion Therapy.             Lab Orders     INR                 Radiology & Cardiology Orders     Future  Labs/Procedures Complete By Expires    XR Chest 2 Views  4/28/2017 7/27/2017      Radiology & Cardiology Orders     XR Chest 2 Views                 Your next 10 appointments already scheduled     May 04, 2017  2:30 PM CDT   (Arrive by 2:15 PM)   New Patient Visit with Mike Enamorado DO   [facility] ([facility])    [address] [place] [address]  4th [facility] [address] [phone]              Follow-Up Appointment Instructions     Future Labs/Procedures    Follow Up and recommended labs and tests     Comments:    1.) Follow up with an INR draw within 3 days for primary care physician, Andrea Nino, or coumadin clinic management of postop Lovenox bridge.  2.) Follow up with Jens Wise MD in Thoracic Surgery clinic in 1 month, prior to which a CXR should be performed.   3.) Follow up with outpatient pain appointment with the [facility] ([facility]) on 05/04/17.      Follow-Up Appointment Instructions     Follow Up and recommended labs and tests       1.) Follow up with an INR draw within 3 days for primary care physician, Andrea Nino, or coumadin clinic management of postop Lovenox bridge.  2.) Follow up with Jens Wise MD in Thoracic Surgery clinic in 1 month, prior to which a CXR should be performed.   3.) Follow up with outpatient pain appointment with the [facility] ([facility]) on 05/04/17.             Statement of Approval     Ordered          04/28/17 1202  I have reviewed and agree with all the recommendations and orders detailed in this document.  EFFECTIVE NOW     Approved and electronically signed by:  Saul Rogers PA-C

## 2017-04-17 NOTE — PROGRESS NOTES
SPIRITUAL HEALTH SERVICES  [facility] 3C   PRE-SURGERY VISIT    Had pre-surgery visit with pt.  Provided spiritual support, prayer.   Tyler Odonnell   Resident   Pager [phone]

## 2017-04-18 ENCOUNTER — APPOINTMENT (OUTPATIENT)
Dept: GENERAL RADIOLOGY | Facility: CLINIC | Age: 72
DRG: 326 | End: 2017-04-18
Attending: THORACIC SURGERY (CARDIOTHORACIC VASCULAR SURGERY)
Payer: MEDICARE

## 2017-04-18 ENCOUNTER — APPOINTMENT (OUTPATIENT)
Dept: PHYSICAL THERAPY | Facility: CLINIC | Age: 72
DRG: 326 | End: 2017-04-18
Attending: THORACIC SURGERY (CARDIOTHORACIC VASCULAR SURGERY)
Payer: MEDICARE

## 2017-04-18 LAB
ANION GAP SERPL CALCULATED.3IONS-SCNC: 6 MMOL/L (ref 3–14)
BUN SERPL-MCNC: 11 MG/DL (ref 7–30)
CALCIUM SERPL-MCNC: 7.8 MG/DL (ref 8.5–10.1)
CHLORIDE SERPL-SCNC: 108 MMOL/L (ref 94–109)
CO2 SERPL-SCNC: 26 MMOL/L (ref 20–32)
CREAT SERPL-MCNC: 0.44 MG/DL (ref 0.52–1.04)
ERYTHROCYTE [DISTWIDTH] IN BLOOD BY AUTOMATED COUNT: 16.4 % (ref 10–15)
GFR SERPL CREATININE-BSD FRML MDRD: ABNORMAL ML/MIN/1.7M2
GLUCOSE BLDC GLUCOMTR-MCNC: 100 MG/DL (ref 70–99)
GLUCOSE BLDC GLUCOMTR-MCNC: 104 MG/DL (ref 70–99)
GLUCOSE BLDC GLUCOMTR-MCNC: 104 MG/DL (ref 70–99)
GLUCOSE BLDC GLUCOMTR-MCNC: 113 MG/DL (ref 70–99)
GLUCOSE BLDC GLUCOMTR-MCNC: 80 MG/DL (ref 70–99)
GLUCOSE BLDC GLUCOMTR-MCNC: 83 MG/DL (ref 70–99)
GLUCOSE SERPL-MCNC: 94 MG/DL (ref 70–99)
HBA1C MFR BLD: 5.6 % (ref 4.3–6)
HCT VFR BLD AUTO: 32.5 % (ref 35–47)
HGB BLD-MCNC: 10.1 G/DL (ref 11.7–15.7)
INR PPP: 1.32 (ref 0.86–1.14)
MAGNESIUM SERPL-MCNC: 1.6 MG/DL (ref 1.6–2.3)
MCH RBC QN AUTO: 26.7 PG (ref 26.5–33)
MCHC RBC AUTO-ENTMCNC: 31.1 G/DL (ref 31.5–36.5)
MCV RBC AUTO: 86 FL (ref 78–100)
MRSA DNA SPEC QL NAA+PROBE: NORMAL
PHOSPHATE SERPL-MCNC: 3.1 MG/DL (ref 2.5–4.5)
PLATELET # BLD AUTO: 230 10E9/L (ref 150–450)
POTASSIUM SERPL-SCNC: 3.9 MMOL/L (ref 3.4–5.3)
RBC # BLD AUTO: 3.78 10E12/L (ref 3.8–5.2)
SODIUM SERPL-SCNC: 140 MMOL/L (ref 133–144)
SPECIMEN SOURCE: NORMAL
WBC # BLD AUTO: 5.9 10E9/L (ref 4–11)

## 2017-04-18 PROCEDURE — 25000132 ZZH RX MED GY IP 250 OP 250 PS 637: Mod: GY | Performed by: NURSE PRACTITIONER

## 2017-04-18 PROCEDURE — 25800025 ZZH RX 258: Performed by: STUDENT IN AN ORGANIZED HEALTH CARE EDUCATION/TRAINING PROGRAM

## 2017-04-18 PROCEDURE — 84100 ASSAY OF PHOSPHORUS: CPT | Performed by: SURGERY

## 2017-04-18 PROCEDURE — 94640 AIRWAY INHALATION TREATMENT: CPT | Mod: 76

## 2017-04-18 PROCEDURE — 25000132 ZZH RX MED GY IP 250 OP 250 PS 637: Mod: GY | Performed by: SURGERY

## 2017-04-18 PROCEDURE — 83735 ASSAY OF MAGNESIUM: CPT | Performed by: SURGERY

## 2017-04-18 PROCEDURE — 25000125 ZZHC RX 250: Performed by: STUDENT IN AN ORGANIZED HEALTH CARE EDUCATION/TRAINING PROGRAM

## 2017-04-18 PROCEDURE — 40000014 ZZH STATISTIC ARTERIAL MONITORING DAILY

## 2017-04-18 PROCEDURE — A9270 NON-COVERED ITEM OR SERVICE: HCPCS | Mod: GY | Performed by: NURSE PRACTITIONER

## 2017-04-18 PROCEDURE — 25800025 ZZH RX 258: Performed by: SURGERY

## 2017-04-18 PROCEDURE — 80048 BASIC METABOLIC PNL TOTAL CA: CPT | Performed by: SURGERY

## 2017-04-18 PROCEDURE — 25000128 H RX IP 250 OP 636: Performed by: STUDENT IN AN ORGANIZED HEALTH CARE EDUCATION/TRAINING PROGRAM

## 2017-04-18 PROCEDURE — A9270 NON-COVERED ITEM OR SERVICE: HCPCS | Mod: GY | Performed by: SURGERY

## 2017-04-18 PROCEDURE — 83036 HEMOGLOBIN GLYCOSYLATED A1C: CPT | Performed by: SURGERY

## 2017-04-18 PROCEDURE — 86923 COMPATIBILITY TEST ELECTRIC: CPT | Performed by: SURGERY

## 2017-04-18 PROCEDURE — 40000985 XR CHEST PORT 1 VW

## 2017-04-18 PROCEDURE — 99207 ZZC CONSULT E&M CHANGED TO INITIAL LEVEL: CPT | Performed by: ANESTHESIOLOGY

## 2017-04-18 PROCEDURE — 25000125 ZZHC RX 250

## 2017-04-18 PROCEDURE — 97530 THERAPEUTIC ACTIVITIES: CPT | Mod: GP | Performed by: PHYSICAL THERAPIST

## 2017-04-18 PROCEDURE — 25000128 H RX IP 250 OP 636: Performed by: SURGERY

## 2017-04-18 PROCEDURE — 85027 COMPLETE CBC AUTOMATED: CPT | Performed by: SURGERY

## 2017-04-18 PROCEDURE — 40000275 ZZH STATISTIC RCP TIME EA 10 MIN

## 2017-04-18 PROCEDURE — 40000196 ZZH STATISTIC RAPCV CVP MONITORING

## 2017-04-18 PROCEDURE — 20000004 ZZH R&B ICU UMMC

## 2017-04-18 PROCEDURE — 87641 MR-STAPH DNA AMP PROBE: CPT | Performed by: STUDENT IN AN ORGANIZED HEALTH CARE EDUCATION/TRAINING PROGRAM

## 2017-04-18 PROCEDURE — 00000146 ZZHCL STATISTIC GLUCOSE BY METER IP

## 2017-04-18 PROCEDURE — 25000132 ZZH RX MED GY IP 250 OP 250 PS 637: Mod: GY | Performed by: PHYSICIAN ASSISTANT

## 2017-04-18 PROCEDURE — 25000125 ZZHC RX 250: Performed by: NURSE PRACTITIONER

## 2017-04-18 PROCEDURE — 99221 1ST HOSP IP/OBS SF/LOW 40: CPT | Performed by: ANESTHESIOLOGY

## 2017-04-18 PROCEDURE — 86901 BLOOD TYPING SEROLOGIC RH(D): CPT | Performed by: SURGERY

## 2017-04-18 PROCEDURE — 25000125 ZZHC RX 250: Performed by: PHYSICIAN ASSISTANT

## 2017-04-18 PROCEDURE — 86900 BLOOD TYPING SEROLOGIC ABO: CPT | Performed by: SURGERY

## 2017-04-18 PROCEDURE — 25000125 ZZHC RX 250: Performed by: SURGERY

## 2017-04-18 PROCEDURE — 99291 CRITICAL CARE FIRST HOUR: CPT | Performed by: NURSE PRACTITIONER

## 2017-04-18 PROCEDURE — 40000193 ZZH STATISTIC PT WARD VISIT: Performed by: PHYSICAL THERAPIST

## 2017-04-18 PROCEDURE — 25000128 H RX IP 250 OP 636: Performed by: NURSE PRACTITIONER

## 2017-04-18 PROCEDURE — 86850 RBC ANTIBODY SCREEN: CPT | Performed by: SURGERY

## 2017-04-18 PROCEDURE — 87640 STAPH A DNA AMP PROBE: CPT | Performed by: STUDENT IN AN ORGANIZED HEALTH CARE EDUCATION/TRAINING PROGRAM

## 2017-04-18 PROCEDURE — 97162 PT EVAL MOD COMPLEX 30 MIN: CPT | Mod: GP | Performed by: PHYSICAL THERAPIST

## 2017-04-18 PROCEDURE — 85610 PROTHROMBIN TIME: CPT | Performed by: SURGERY

## 2017-04-18 RX ORDER — POTASSIUM CHLORIDE 29.8 MG/ML
20 INJECTION INTRAVENOUS
Status: DISCONTINUED | OUTPATIENT
Start: 2017-04-18 | End: 2017-04-25

## 2017-04-18 RX ORDER — NALOXONE HYDROCHLORIDE 0.4 MG/ML
.1-.4 INJECTION, SOLUTION INTRAMUSCULAR; INTRAVENOUS; SUBCUTANEOUS
Status: DISCONTINUED | OUTPATIENT
Start: 2017-04-18 | End: 2017-04-18

## 2017-04-18 RX ORDER — DOXEPIN HYDROCHLORIDE 10 MG/ML
3 SOLUTION ORAL AT BEDTIME
Status: DISCONTINUED | OUTPATIENT
Start: 2017-04-18 | End: 2017-04-28 | Stop reason: HOSPADM

## 2017-04-18 RX ORDER — OXYCODONE HCL 5 MG/5 ML
5 SOLUTION, ORAL ORAL
Status: DISCONTINUED | OUTPATIENT
Start: 2017-04-18 | End: 2017-04-19

## 2017-04-18 RX ORDER — DIAZEPAM 10 MG/2ML
2.5 INJECTION, SOLUTION INTRAMUSCULAR; INTRAVENOUS ONCE
Status: COMPLETED | OUTPATIENT
Start: 2017-04-18 | End: 2017-04-18

## 2017-04-18 RX ORDER — CHLORHEXIDINE GLUCONATE ORAL RINSE 1.2 MG/ML
15 SOLUTION DENTAL 2 TIMES DAILY
Status: DISCONTINUED | OUTPATIENT
Start: 2017-04-18 | End: 2017-04-25

## 2017-04-18 RX ORDER — POTASSIUM CL/LIDO/0.9 % NACL 10MEQ/0.1L
10 INTRAVENOUS SOLUTION, PIGGYBACK (ML) INTRAVENOUS
Status: DISCONTINUED | OUTPATIENT
Start: 2017-04-18 | End: 2017-04-25

## 2017-04-18 RX ORDER — METOPROLOL TARTRATE 1 MG/ML
5 INJECTION, SOLUTION INTRAVENOUS EVERY 6 HOURS
Status: DISCONTINUED | OUTPATIENT
Start: 2017-04-18 | End: 2017-04-18

## 2017-04-18 RX ORDER — OXYCODONE HCL 5 MG/5 ML
5-10 SOLUTION, ORAL ORAL
Status: DISCONTINUED | OUTPATIENT
Start: 2017-04-18 | End: 2017-04-18

## 2017-04-18 RX ORDER — METOPROLOL TARTRATE 1 MG/ML
INJECTION, SOLUTION INTRAVENOUS
Status: COMPLETED
Start: 2017-04-18 | End: 2017-04-18

## 2017-04-18 RX ORDER — HEPARIN SODIUM 5000 [USP'U]/.5ML
5000 INJECTION, SOLUTION INTRAVENOUS; SUBCUTANEOUS EVERY 8 HOURS
Status: DISCONTINUED | OUTPATIENT
Start: 2017-04-18 | End: 2017-04-21

## 2017-04-18 RX ORDER — MAGNESIUM SULFATE HEPTAHYDRATE 40 MG/ML
4 INJECTION, SOLUTION INTRAVENOUS EVERY 4 HOURS PRN
Status: DISCONTINUED | OUTPATIENT
Start: 2017-04-18 | End: 2017-04-18

## 2017-04-18 RX ORDER — MAGNESIUM SULFATE HEPTAHYDRATE 40 MG/ML
4 INJECTION, SOLUTION INTRAVENOUS EVERY 4 HOURS PRN
Status: DISCONTINUED | OUTPATIENT
Start: 2017-04-18 | End: 2017-04-28 | Stop reason: HOSPADM

## 2017-04-18 RX ORDER — GABAPENTIN 250 MG/5ML
100 SOLUTION ORAL EVERY 8 HOURS SCHEDULED
Status: DISCONTINUED | OUTPATIENT
Start: 2017-04-18 | End: 2017-04-19

## 2017-04-18 RX ORDER — POTASSIUM CHLORIDE 7.45 MG/ML
10 INJECTION INTRAVENOUS
Status: DISCONTINUED | OUTPATIENT
Start: 2017-04-18 | End: 2017-04-25

## 2017-04-18 RX ORDER — LIDOCAINE HYDROCHLORIDE 10 MG/ML
INJECTION, SOLUTION EPIDURAL; INFILTRATION; INTRACAUDAL; PERINEURAL
Status: DISCONTINUED
Start: 2017-04-18 | End: 2017-04-20 | Stop reason: HOSPADM

## 2017-04-18 RX ORDER — HYDROMORPHONE HCL/0.9% NACL/PF 0.2MG/0.2
.2-.4 SYRINGE (ML) INTRAVENOUS
Status: DISCONTINUED | OUTPATIENT
Start: 2017-04-18 | End: 2017-04-19

## 2017-04-18 RX ORDER — SODIUM CHLORIDE FOR INHALATION 3 %
3 VIAL, NEBULIZER (ML) INHALATION
Status: DISCONTINUED | OUTPATIENT
Start: 2017-04-18 | End: 2017-04-23

## 2017-04-18 RX ORDER — GABAPENTIN 100 MG/1
100 CAPSULE ORAL 3 TIMES DAILY
Status: DISCONTINUED | OUTPATIENT
Start: 2017-04-18 | End: 2017-04-18

## 2017-04-18 RX ORDER — ONDANSETRON 4 MG/1
4 TABLET, ORALLY DISINTEGRATING ORAL EVERY 6 HOURS PRN
Status: DISCONTINUED | OUTPATIENT
Start: 2017-04-18 | End: 2017-04-18

## 2017-04-18 RX ORDER — ACETAMINOPHEN 10 MG/ML
1000 INJECTION, SOLUTION INTRAVENOUS ONCE
Status: COMPLETED | OUTPATIENT
Start: 2017-04-18 | End: 2017-04-18

## 2017-04-18 RX ORDER — METHOCARBAMOL 500 MG/1
500 TABLET, FILM COATED ORAL 3 TIMES DAILY
Status: DISCONTINUED | OUTPATIENT
Start: 2017-04-18 | End: 2017-04-19

## 2017-04-18 RX ORDER — ONDANSETRON 2 MG/ML
4 INJECTION INTRAMUSCULAR; INTRAVENOUS EVERY 6 HOURS PRN
Status: DISCONTINUED | OUTPATIENT
Start: 2017-04-18 | End: 2017-04-18

## 2017-04-18 RX ORDER — IPRATROPIUM BROMIDE AND ALBUTEROL SULFATE 2.5; .5 MG/3ML; MG/3ML
3 SOLUTION RESPIRATORY (INHALATION) 4 TIMES DAILY
Status: DISCONTINUED | OUTPATIENT
Start: 2017-04-18 | End: 2017-04-18

## 2017-04-18 RX ORDER — ACETAMINOPHEN 325 MG/1
975 TABLET ORAL EVERY 8 HOURS
Status: DISCONTINUED | OUTPATIENT
Start: 2017-04-18 | End: 2017-04-18

## 2017-04-18 RX ORDER — OXYCODONE HCL 5 MG/5 ML
5 SOLUTION, ORAL ORAL EVERY 4 HOURS
Status: DISCONTINUED | OUTPATIENT
Start: 2017-04-18 | End: 2017-04-18

## 2017-04-18 RX ORDER — PANTOPRAZOLE SODIUM 40 MG/1
40 TABLET, DELAYED RELEASE ORAL DAILY
Status: DISCONTINUED | OUTPATIENT
Start: 2017-04-18 | End: 2017-04-18 | Stop reason: CLARIF

## 2017-04-18 RX ORDER — LIDOCAINE 40 MG/G
CREAM TOPICAL
Status: DISCONTINUED | OUTPATIENT
Start: 2017-04-18 | End: 2017-04-28 | Stop reason: HOSPADM

## 2017-04-18 RX ORDER — DOXEPIN HYDROCHLORIDE 10 MG/1
10 CAPSULE ORAL AT BEDTIME
Status: DISCONTINUED | OUTPATIENT
Start: 2017-04-18 | End: 2017-04-18

## 2017-04-18 RX ORDER — LEVALBUTEROL INHALATION SOLUTION 0.63 MG/3ML
0.63 SOLUTION RESPIRATORY (INHALATION)
Status: DISCONTINUED | OUTPATIENT
Start: 2017-04-18 | End: 2017-04-25

## 2017-04-18 RX ORDER — LIDOCAINE 50 MG/G
1-2 PATCH TOPICAL
Status: DISCONTINUED | OUTPATIENT
Start: 2017-04-18 | End: 2017-04-26

## 2017-04-18 RX ORDER — OXYCODONE HCL 5 MG/5 ML
10 SOLUTION, ORAL ORAL EVERY 4 HOURS
Status: DISCONTINUED | OUTPATIENT
Start: 2017-04-18 | End: 2017-04-19

## 2017-04-18 RX ORDER — PROCHLORPERAZINE MALEATE 5 MG
5 TABLET ORAL EVERY 6 HOURS PRN
Status: DISCONTINUED | OUTPATIENT
Start: 2017-04-18 | End: 2017-04-18

## 2017-04-18 RX ADMIN — DOXEPIN HYDROCHLORIDE 3 MG: 10 SOLUTION ORAL at 21:28

## 2017-04-18 RX ADMIN — LEVALBUTEROL HYDROCHLORIDE 0.63 MG: 0.63 SOLUTION RESPIRATORY (INHALATION) at 20:39

## 2017-04-18 RX ADMIN — GABAPENTIN 100 MG: 100 CAPSULE ORAL at 09:42

## 2017-04-18 RX ADMIN — SODIUM CHLORIDE SOLN NEBU 3% 3 ML: 3 NEBU SOLN at 15:43

## 2017-04-18 RX ADMIN — LEVALBUTEROL HYDROCHLORIDE 0.63 MG: 0.63 SOLUTION RESPIRATORY (INHALATION) at 15:38

## 2017-04-18 RX ADMIN — CHLORHEXIDINE GLUCONATE 15 ML: 1.2 RINSE ORAL at 09:44

## 2017-04-18 RX ADMIN — METOPROLOL TARTRATE 5 MG: 1 INJECTION, SOLUTION INTRAVENOUS at 00:00

## 2017-04-18 RX ADMIN — CHLORHEXIDINE GLUCONATE 15 ML: 1.2 RINSE ORAL at 20:04

## 2017-04-18 RX ADMIN — ANTISEPTIC SURGICAL SCRUB: 0.04 SOLUTION TOPICAL at 21:44

## 2017-04-18 RX ADMIN — METHOCARBAMOL 500 MG: 500 TABLET ORAL at 09:48

## 2017-04-18 RX ADMIN — HYDROMORPHONE HYDROCHLORIDE 0.3 MG: 10 INJECTION, SOLUTION INTRAMUSCULAR; INTRAVENOUS; SUBCUTANEOUS at 18:19

## 2017-04-18 RX ADMIN — OXYCODONE HYDROCHLORIDE 10 MG: 5 SOLUTION ORAL at 21:29

## 2017-04-18 RX ADMIN — METOPROLOL TARTRATE 5 MG: 5 INJECTION INTRAVENOUS at 00:00

## 2017-04-18 RX ADMIN — LIDOCAINE 2 PATCH: 50 PATCH TOPICAL at 09:48

## 2017-04-18 RX ADMIN — HEPARIN SODIUM 5000 UNITS: 5000 INJECTION, SOLUTION INTRAVENOUS; SUBCUTANEOUS at 12:44

## 2017-04-18 RX ADMIN — ACETAMINOPHEN 975 MG: 325 TABLET, FILM COATED ORAL at 09:41

## 2017-04-18 RX ADMIN — SODIUM CHLORIDE, POTASSIUM CHLORIDE, SODIUM LACTATE AND CALCIUM CHLORIDE: 600; 310; 30; 20 INJECTION, SOLUTION INTRAVENOUS at 12:44

## 2017-04-18 RX ADMIN — OXYCODONE HYDROCHLORIDE 10 MG: 5 SOLUTION ORAL at 13:42

## 2017-04-18 RX ADMIN — ANTISEPTIC SURGICAL SCRUB: 0.04 SOLUTION TOPICAL at 09:45

## 2017-04-18 RX ADMIN — POTASSIUM CHLORIDE 20 MEQ: 29.8 INJECTION, SOLUTION INTRAVENOUS at 09:43

## 2017-04-18 RX ADMIN — DIAZEPAM 2.5 MG: 5 INJECTION, SOLUTION INTRAMUSCULAR; INTRAVENOUS at 19:53

## 2017-04-18 RX ADMIN — METHOCARBAMOL 500 MG: 500 TABLET ORAL at 13:42

## 2017-04-18 RX ADMIN — SODIUM CHLORIDE, POTASSIUM CHLORIDE, SODIUM LACTATE AND CALCIUM CHLORIDE 500 ML: 600; 310; 30; 20 INJECTION, SOLUTION INTRAVENOUS at 10:45

## 2017-04-18 RX ADMIN — OXYCODONE HYDROCHLORIDE 10 MG: 5 SOLUTION ORAL at 07:58

## 2017-04-18 RX ADMIN — OXYCODONE HYDROCHLORIDE 5 MG: 5 SOLUTION ORAL at 15:22

## 2017-04-18 RX ADMIN — PANTOPRAZOLE SODIUM 40 MG: 40 TABLET, DELAYED RELEASE ORAL at 12:45

## 2017-04-18 RX ADMIN — HYDROMORPHONE HYDROCHLORIDE 0.4 MG: 10 INJECTION, SOLUTION INTRAMUSCULAR; INTRAVENOUS; SUBCUTANEOUS at 20:41

## 2017-04-18 RX ADMIN — IPRATROPIUM BROMIDE AND ALBUTEROL SULFATE 3 ML: .5; 3 SOLUTION RESPIRATORY (INHALATION) at 11:49

## 2017-04-18 RX ADMIN — METOPROLOL TARTRATE 12.5 MG: 100 TABLET ORAL at 09:43

## 2017-04-18 RX ADMIN — SODIUM CHLORIDE SOLN NEBU 3% 3 ML: 3 NEBU SOLN at 20:38

## 2017-04-18 RX ADMIN — METHOCARBAMOL 500 MG: 500 TABLET ORAL at 20:07

## 2017-04-18 RX ADMIN — OXYCODONE HYDROCHLORIDE 10 MG: 5 SOLUTION ORAL at 17:07

## 2017-04-18 RX ADMIN — GABAPENTIN 100 MG: 250 SOLUTION ORAL at 21:28

## 2017-04-18 RX ADMIN — ACETAMINOPHEN 975 MG: 325 SOLUTION ORAL at 15:24

## 2017-04-18 RX ADMIN — SODIUM CHLORIDE, POTASSIUM CHLORIDE, SODIUM LACTATE AND CALCIUM CHLORIDE 500 ML: 600; 310; 30; 20 INJECTION, SOLUTION INTRAVENOUS at 22:25

## 2017-04-18 RX ADMIN — ACETAMINOPHEN 1000 MG: 10 INJECTION, SOLUTION INTRAVENOUS at 21:13

## 2017-04-18 RX ADMIN — HEPARIN SODIUM 5000 UNITS: 5000 INJECTION, SOLUTION INTRAVENOUS; SUBCUTANEOUS at 19:56

## 2017-04-18 RX ADMIN — Medication 2 G: at 14:11

## 2017-04-18 RX ADMIN — GABAPENTIN 100 MG: 100 CAPSULE ORAL at 13:42

## 2017-04-18 RX ADMIN — OXYCODONE HYDROCHLORIDE 5 MG: 5 SOLUTION ORAL at 09:41

## 2017-04-18 RX ADMIN — ACETAMINOPHEN 650 MG: 10 INJECTION, SOLUTION INTRAVENOUS at 00:00

## 2017-04-18 ASSESSMENT — PAIN DESCRIPTION - DESCRIPTORS
DESCRIPTORS: CRAMPING
DESCRIPTORS: ACHING;DISCOMFORT
DESCRIPTORS: ACHING;DULL;DISCOMFORT

## 2017-04-18 NOTE — PROGRESS NOTES
SURGICAL ICU PROGRESS NOTE  April 18, 2017      CO-MORBIDITIES:   Esophageal perforation  On warfarin therapy    PRIMARY TEAM: Thoracic   PRIMARY PHYSICIAN: OLIMPIA Wise MD     ASSESSMENT:  Minerva Blanco is a 72 year old female with a history of a fib on coumadin (now held), breast cancer s/p breast conservation therapy, fibromyalgia, MS, hiatal hernia and GERD s/p Toupet fundoplication 1/2016 with a post operative course that was complicated by esophageal perforation with mediastinal phlegmon, and was taken back for proximal gastrectomy, distal esophagectomy, spit fistula creation, and left thoracotomy with mediastinal phlegmon excision on 1/9/2017. Neck Dissection, Spit Fistula Takedown, Laparoscopic Jejunostomy Tube and Pharyngostomy Tube, Gastric Pull up, Upper Endoscopy(EGD) 4/17/17.     Today's Interval Changes:  -d/c arterial line  -discuss warfarin with thoracic surgery; do not resume yet per thoracic team.   -discuss chest tube to water seal or suction with thoracic surgery> suction per thoracic   -transition to oral pain medications  -starting gabapentin, robaxin, doxepin per pain team recommendations  -wean dilaudid PCA as able    PLAN:  Neuro/ pain/ sedation:  #Acute surgical pain  - Hydromorphone PCA for pain.  - PO acetaminophen 975 q8  - Oxycodone 5mg q4  - Gabapentin 100 mg TID  - Robaxin 500mg  TID  - Doxepin 3mg qhs    Pulmonary care:   #Respiratory insufficiency  -Supplemental oxygen to keep saturation above 92 %.  -Capnography.  -Incentive spirometer every 15- 30 minutes when awake.  -Right chest tube to -10 suction      Cardiovascular:    - A fib on metoprolol 12.5 BID; with hold parameters    - Keep MAPs above 60    GI/Nutrition:   #Severe malnutrition in context of acute illness  -NPO except meds  -J to gravity; ok to use/clamp for meds   -Pharyngostomy to LIS  -Nutrition consultation; will confirm with Thoracic surgery when ok to start nutrition. Continue to hold nutrition for  today    Renal/Fluids/ Electrolytes:   -Monitor I&Os closely.  -Cr at baseline of 0.42  -ICU electrolyte replacement protocol  -mIVF with LR     Endocrine:   -SSI     ID/ Antibiotics:  -No indication for antibiotics.      Heme:   #acute blood loss anemia  -Hemoglobin stable. Am Hgb of 10.1    #h/o Afib on coumadin.  -Coumadin for afib at home. Now held.  -Heparin sc 5000 TID ordered by thoracic surgery  -Discuss resuming coumadin with thoracic surgery    Prophylaxis:    -Mechanical prophylaxis for DVT.   -Heparin  -Protonix     MSK:   -PT and OT consulted. Appreciate recs.     Lines/ tubes/ drains:  - Pharyngostomy, CT, right central line, PIV     Disposition:  -Surgical ICU.      This patient was seen and discussed with Dr. Cynthia Brady, SICU staff, who agrees with the plan above.       Time Spent on this Encounter   I spent 40 minutes managing the critical care of Minerva Blanco in relation to the issues listed in this note.  Sobia Scott, ACNP     ====================================    TODAY'S PROGRESS:   SUBJECTIVE:   Course reviewed.  Some hypotension and low urine output overnight.  Received fluid bolus; improved. Patient is awake/alert on exam this morning.  Complains of significant post-operative pain rating 9/10. Patient denies n/v, fever, chills, dyspnea. Will initiate enteral analgesia; begin to wean off dilaudid PCA as able.  Therapies today as tolerated.  Plan to transfer to floor later this afternoon or tomorrow morning.  Appreciate co-management of Thoracic Surgery team     OBJECTIVE:   1. VITAL SIGNS:   Temp:  [96.8  F (36  C)-97.2  F (36.2  C)] 97.2  F (36.2  C)  Heart Rate:  [64-96] 96  Resp:  [0-22] 15  BP: ()/(47-85) 98/54  MAP:  [59 mmHg-91 mmHg] 73 mmHg  Arterial Line BP: ()/(39-61) 114/47  SpO2:  [92 %-100 %] 95 %  Resp: 15    2. INTAKE/ OUTPUT:   I/O last 3 completed shifts:  In: 2900 [I.V.:2900]  Out: 840 [Urine:390; Blood:330; Chest Tube:120]    3. PHYSICAL EXAMINATION:    General: awake, alert, endorses significant pain   HEENT: Neck with left pharyngostomy tube to LIS; sutures intact.   Neuro: alert, orieted, follow commands correctly and consistently. No focal deficits  Pulm/Resp: Course and wheezing on the right; diminished on left.  CT on right to suction.   CV: RRR, S1, S2, no murmur rubs or gallops   Abdomen: Soft, non-distended, non-tender, no rebound tenderness or guarding, no masses. G-tube clamped; dressing CDI   : ojeda catheter in place, urine yellow and clear  Incisions/Skin: anterior neck incision; sternal incision, CDI. Minimal serosang drainage  Extremities: Moving all extremities, peripheral pulses 2+, calves soft and non-tener, extremities well perfused.     4. INVESTIGATIONS:   Arterial Blood Gases     Recent Labs  Lab 04/17/17  1306 04/17/17  1125   PH 7.36 7.37   PCO2 46* 43   PO2 192* 246*   HCO3 26 25     Complete Blood Count     Recent Labs  Lab 04/18/17 0446 04/17/17  1702 04/17/17  1306 04/17/17  1125  04/14/17  0821   WBC 5.9 10.5  --   --   --  7.9   HGB 10.1* 9.0* 9.5* 9.1*  < > 11.9    312  --   --   --  313   < > = values in this interval not displayed.  Basic Metabolic Panel    Recent Labs  Lab 04/18/17 0446 04/17/17  1702 04/17/17  1306 04/17/17  1125 04/17/17  0653 04/14/17  0821    138 136 136 133 138   POTASSIUM 3.9 3.4 2.9* 3.1* 3.6 4.0   CHLORIDE 108 107  --   --  97 100   CO2 26 23  --   --  31 32   BUN 11 15  --   --  16 33*   CR 0.44* 0.42*  --   --  0.52 0.59   GLC 94 161* 149* 150* 74 84     Liver Function Tests    Recent Labs  Lab 04/18/17 0446 04/17/17  1702 04/17/17  0653 04/14/17  0821   AST  --   --  38  --    ALT  --   --  56*  --    ALKPHOS  --   --  283*  --    BILITOTAL  --   --  0.8  --    ALBUMIN  --   --  3.6  --    INR 1.32* 1.15* 1.04 1.07     Pancreatic Enzymes  No lab results found in last 7 days.  Coagulation Profile    Recent Labs  Lab 04/18/17  0446 04/17/17  1702 04/17/17  0653 04/14/17  0821   INR  1.32* 1.15* 1.04 1.07         5. RADIOLOGY:   Recent Results (from the past 24 hour(s))   XR Chest Port 1 View    Narrative    Exam:  XR CHEST PORT 1 VW, 4/17/2017 5:04 PM    History: Verify position of central venous catheter(s) inserted in the  O.R.    Comparison:  Chest x-ray, 1/24/2017    Findings:  There is a right IJ central venous catheter with tip over  the mid SVC, pharyngostomy tube with tip over the right upper  quadrant, skin staples, and a new right chest tube. Right axillary  surgical clips are unchanged. The cardiac silhouette and pulmonary  vessels are within normal limits. There is a new hazy opacity in the  right midlung adjacent to the tip of the chest tube. There are  improved left lung opacities. Stable left greater than right pleural  effusions. No significant pneumothorax.      Impression    Impression :   1. New right IJ central venous catheter with tip over the mid SVC and  tracheostomy tube with tip over the right upper quadrant.  2. New right chest tube with associated new hazy opacities in the  right midlung.  3. Left greater than right pleural effusions.    I have personally reviewed the examination and initial interpretation  and I agree with the findings.    DAVIS MENARD MD       =========================================

## 2017-04-18 NOTE — PROGRESS NOTES
"CLINICAL NUTRITION SERVICES - ASSESSMENT NOTE     Nutrition Prescription    RECOMMENDATIONS FOR MDs/PROVIDERS TO ORDER:  When initiation of TF is appropriate please send nutrition consult for \"Registered Dietitian to Assess and Order TF per MNT protocol\"    Malnutrition Status:    Severe malnutrition in the context of acute illness    Recommendations already ordered by Registered Dietitian (RD):  None at this time    Future/Additional Recommendations:  When ready to start EN via J-Tube, would recommend the following:  -If K/Mg are WNL and Phos >1.9, start TF regimen via J-Tube: Impact Peptide @ 10 mL/hr  -If lytes remain WNL, adv TF by 10 mL q8h to goal 50 ml/hr (1200 ml/day) to provide 1800 kcals (44 kcal/kg), 113 g PRO (2.8 g/kg), 924 ml free H2O, 77 g Fat (50% from MCTs), 168 g CHO and no Fiber daily per dosing weight 41 kg.  -15 mL liquid MVI for micronutrient needs  -30 mL water flush q4h for tube patency  -Baseline and weekly CRP checks with immune-modulating TF formula  -Monitor renal labs and BUN with protein provisions >2 g/kg  -Need to increase provisions pending s/s of malabsorption     REASON FOR ASSESSMENT  Minerva Blanco is a/an 72 year old female assessed by the dietitian for Admission Nutrition Risk Screen for unintentional loss of 10# or more in the past two months and tube feeding or parenteral nutrition    NUTRITION HISTORY  Unable to obtain r/t pt great amount of pain during visit.    -breast cancer s/p breast conservation therapy, fibromyalgia, MS, hiatal hernia and GERD s/p Toupet fundoplication 1/2016 with a post operative course that was complicated by esophageal perforation with mediastinal phlegmon  -G Tube (5/4/2016)  -4/17: Lap Neck Dissection, Spit Fistula Takedown, Lap Jejunostomy Tube and Pharyngostomy Tube, Gastric Pull up, Upper Endoscopy (EGD), Flexible Bronchoscopy, Sternotomy     Per RD notes from pt's last hospital visit (1/2017), pt was receiving TF via Gtube: Impact Peptide " "@ goal 50 ml/hr (1200 ml/day) to provide 1800 kcals, 113 g PRO, 924 ml free H2O, 77 g Fat (50% from MCTs), 168 g CHO and no Fiber daily.    Per [facility], pt was transitioned to 3-4 cans TwoCal + 1 can Iso 1.5 (provides 2295 kcal and 94 g PRO) r/t diarrhea. [facility] RD suspects pt malabsorbing r/t high calorie/protein provisions but not maintaining weight.    CURRENT NUTRITION ORDERS  Diet: NPO  Intake/Tolerance: N/A    LABS  Labs reviewed    MEDICATIONS  Medications reviewed    ANTHROPOMETRICS  Height: 152.4 cm (5' 0\")  Most Recent Weight: 40.8 kg (89 lb 15.2 oz)    IBW: 45.5 kg  BMI: 17.6; Underweight BMI <18.5  Weight History:   Wt Readings from Last 10 Encounters:   04/17/17 40.8 kg (89 lb 15.2 oz)   04/12/17 40.7 kg (89 lb 12.8 oz)   04/12/17 40.7 kg (89 lb 12.8 oz)   02/22/17 44.5 kg (98 lb)   02/22/17 44.7 kg (98 lb 9.6 oz)   02/01/17 47.7 kg (105 lb 1.6 oz)   01/24/17 48.1 kg (106 lb 1.6 oz)   01/04/17 45.9 kg (101 lb 4.8 oz)   01/02/17 45.5 kg (100 lb 3.2 oz)   11/23/16 43.6 kg (96 lb 1.6 oz)   7.2 kg, 15% weight loss over the past 3 months  Dosing Weight: 41 kg (actual based on lowest admit wt of 40.8 kg on 4/17, 90% of IBW 45.5 kg)    ASSESSED NUTRITION NEEDS  Estimated Energy Needs: 7599-1615+ kcals/day (35 - 40+ kcals/kg )  Justification: Post-op, Repletion  Estimated Protein Needs: 62-82+ grams protein/day (1.5 - 2+ grams of pro/kg)  Justification: Post-op and Repletion  Estimated Fluid Needs: (1 mL/kcal)   Justification: Per provider pending fluid status    PHYSICAL FINDINGS  See malnutrition section below.  Poor skin turgor     MALNUTRITION  % Intake: No decreased intake noted  % Weight Loss: > 7.5% in 3 months (severe)  Subcutaneous Fat Loss: Facial region, Upper arm, Lower arm and Thoracic/intercostal:  Moderate  Muscle Loss: Temporal, Facial & jaw region, Thoracic region (clavicle, acromium bone, deltoid, trapezius, pectoral), Upper arm (bicep, tricep), Lower arm  (forearm), Patellar region and Posterior " calf:  Moderate  Fluid Accumulation/Edema: None noted  Malnutrition Diagnosis: Severe malnutrition in the context of acute illness    NUTRITION DIAGNOSIS  Inadequate protein-energy intake related to increased needs post-op/repletion as evidenced by NPO without plan to start nutrition today.      INTERVENTIONS  Implementation  Nutrition Education: Unable to complete due to pt in pain during visit   Collaboration with other providers  Enteral Nutrition - Recs    Goals  Diet adv v nutrition support within 2-3 days.  Total avg nutritional intake to meet a minimum of 30 kcal/kg and 1.5 g PRO/kg daily (per dosing wt 41 kg).     Monitoring/Evaluation  Progress toward goals will be monitored and evaluated per protocol.    Yanet Riggs RDN, LD  Pgr: [id number]

## 2017-04-18 NOTE — PROGRESS NOTES
Thoracic Surgery Progress Note  Minerva Blanco  [id number]    Subjective  500 albumin for hypotension.  Working on pain control this AM.    Objective  Temp:  [96.8  F (36  C)-98.4  F (36.9  C)] 98.1  F (36.7  C)  Heart Rate:  [64-98] 93  Resp:  [0-32] 26  BP: ()/(47-85) 118/72  MAP:  [59 mmHg-91 mmHg] 73 mmHg  Arterial Line BP: ()/(39-61) 113/46  SpO2:  [91 %-100 %] 100 %    Physical Exam:  Gen: Awake, alert, NAD  HEENT: Pharyngostomy flushed easily, takedown site c/d/i  CVS: RRR  Resp: NLB on 2L NC, incisions c/d/i, CT serosang, + air leak  Abd: Soft, appropriately TTP, incisions c/d/i, J tube with minimal output  Extrem: WWP    Cr normal   Hgb stable    CXR unchanged    Assessment & Plan  72 year old female with history of leak s/p topuet flundoplication, s/p multiple procedures, ultimately esophagectomy, spit fistula 1/2017, now s/p lap restrosternal gastric pull through, resection of left half of manubrium, spit fistula takedown, J tube, and pharnygostomy placement 4/17.    Neuro: Dilaudid PCA, schedule oxy, pain consult  Resp: Prn NC, CT to suction  Cardio: Metoprolol BID  GI/FEN: NPO, no feeds, J to gravity okay to use for meds, pharyngostomy to LIS, flush q4, cares ordered  Renal: Adequate UOP, electrolyte replacement  Endo: SSI  ID: Periop abx completed  PPx: SQH, no full anticoagulation yet (hx afib), PPI    Discussed with Dr. Wise.    Alvin Dee MD  General Surgery PGY-3  [phone]

## 2017-04-18 NOTE — PROGRESS NOTES
04/18/17 1445   Quick Adds   Type of Visit Initial PT Evaluation   Living Environment   Lives With spouse   Living Arrangements condominium   Home Accessibility no concerns   Number of Stairs to Enter Home 0   Number of Stairs Within Home 0   Stair Railings at Home none   Transportation Available family or friend will provide   Living Environment Comment Pt  provides transportation; moved into fully accessible condo in April 2016   Self-Care   Dominant Hand right   Usual Activity Tolerance good   Current Activity Tolerance fair   Regular Exercise yes   Activity/Exercise Type (recently started home PT to assist w/ her strength, posture)   Equipment Currently Used at Home none   Activity/Exercise/Self-Care Comment Pt reports she was previously independent with mobility and basic self cares. Pt  works from home and is available prn. Pt reports she does not use an AD for mobility though does have modified gait with forward posture due to chronic pain issues. Pt reports ludwin has been well managed for well over a year. pt reports she is able to ambulate throughout a store etc. without concern at baseline   Functional Level Prior   Ambulation 0-->independent   Transferring 0-->independent   Toileting 0-->independent   Bathing 0-->independent   Dressing 0-->independent   Communication 0-->understands/communicates without difficulty   Cognition 0 - no cognition issues reported   Fall history within last six months no   General Information   Onset of Illness/Injury or Date of Surgery - Date 04/17/17   Referring Physician MD Alvin Dee   Patient/Family Goals Statement pain control   Pertinent History of Current Problem (include personal factors and/or comorbidities that impact the POC) 72 year old female with a history of a fib on coumadin (now held), breast cancer s/p breast conservation therapy, fibromyalgia, MS, hiatal hernia and GERD s/p Toupet fundoplication 1/2016 with a post operative course that was  complicated by esophageal perforation with mediastinal phlegmon, and was taken back for proximal gastrectomy, distal esophagectomy, spit fistula creation, and left thoracotomy with mediastinal phlegmon excision on 1/9/2017. Pt now s/p Neck Dissection, Spit Fistula Takedown, Laparoscopic Jejunostomy Tube and Pharyngostomy Tube, Gastric Pull up, Upper Endoscopy(EGD),  Resection of the left half of the manubrium, and partial sternotomy on 4/17/17.   Precautions/Limitations oxygen therapy device and L/min;sternal precautions   Weight-Bearing Status - LUE (no pushing, pulling, lifting > 10# for 6-8 weeks)   Weight-Bearing Status - RUE (no pushing, pulling, lifting > 10# for 6-8 weeks)   Heart Disease Risk Factors Age;Gender   General Observations female pt resting in bed, numerous lines and drains in place   Cognitive Status Examination   Orientation orientation to person, place and time   Level of Consciousness alert   Follows Commands and Answers Questions 100% of the time   Personal Safety and Judgment intact  (w/ cues for sternal precautions)   Memory intact   Pain Assessment   Patient Currently in Pain Yes, see Vital Sign flowsheet   Integumentary/Edema   Integumentary/Edema Comments impaired d/t surgery   Posture    Posture Comments posture: forward head, rounded shoulder, kyphosis   Range of Motion (ROM)   ROM Comment B shoulder flexion ~100 degrees, shoulder ABD ~90 degrees limited by post-surgical pain; otherwise BUE elbow, wrist, B hip, knee AROM WFL. Ankle DF on R ~5 degrees, L <5 degrees past neutral   Strength   Strength Comments demos at least 3/5 strength in LEs when sitting EOB   Bed Mobility   Bed Mobility Comments rolls w/ SBA and cues; sidelying > sit EOB w/ min A at her trunk   Transfer Skills   Transfer Comments STS w/ cues for sternal precautions CGA + Ax1 for line mgmt   Gait   Gait Comments pt ambulated from bed > chair 3 ft w/ CGA + Ax1 for line mgmt; short step length noted, diminished foot  "clearance    Balance   Balance Comments seated balance intact; standing balance w/ CGA during stepping to chair   Sensory Examination   Sensory Perception Comments pt denies sensation changes; BLE LT intact to screen   General Therapy Interventions   Planned Therapy Interventions balance training;bed mobility training;gait training;neuromuscular re-education;strengthening;transfer training   Clinical Impression   Criteria for Skilled Therapeutic Intervention yes, treatment indicated   PT Diagnosis impaired functional mobility following surgery   Influenced by the following impairments sternal precautions; pain; fatigue; limited muscle mass   Functional limitations due to impairments impaired IND w/ bed mobility, transfers, gait   Clinical Presentation Evolving/Changing   Clinical Presentation Rationale pain; IND prior to admission; now s/p complex surgery   Clinical Decision Making (Complexity) Moderate complexity   Therapy Frequency` daily   Predicted Duration of Therapy Intervention (days/wks) 4/25   Anticipated Discharge Disposition Home with Home Therapy  (as long as pt progresses w/ safe transfers and gait)   Risk & Benefits of therapy have been explained Yes   Patient, Family & other staff in agreement with plan of care Yes   [facility] GLO TM \"6 Clicks\"   2016, Trustees of [facility], under license to [employer].  All rights reserved.   6 Clicks Short Forms Basic Mobility Inpatient Short Form   [facility] AM-PAC  \"6 Clicks\" V.2 Basic Mobility Inpatient Short Form   1. Turning from your back to your side while in a flat bed without using bedrails? 3 - A Little   2. Moving from lying on your back to sitting on the side of a flat bed without using bedrails? 3 - A Little   3. Moving to and from a bed to a chair (including a wheelchair)? 3 - A Little   4. Standing up from a chair using your arms (e.g., wheelchair, or bedside chair)? 3 - A Little   5. To walk in hospital room? 3 - A Little "   6. Climbing 3-5 steps with a railing? 2 - A Lot   Basic Mobility Raw Score (Score out of 24.Lower scores equate to lower levels of function) 17   Total Evaluation Time   Total Evaluation Time (Minutes) 12

## 2017-04-18 NOTE — PROGRESS NOTES
[facility]  Patient is currently open to home care services with [facility].  The patient is currently receiving RN services.  [facility]  and team have been notified of patient admission.  [facility] liaison will continue to follow patient during stay.  If appropriate provide orders to resume home care at time of discharge.    Arlen Callaway RN, BSN  [facility] Liaison  [phone]

## 2017-04-18 NOTE — OP NOTE
DATE OF PROCEDURE:  04/17/2017      PREOPERATIVE DIAGNOSES:   1.  Esophageal discontinuity.   2.  End-stage gastroesophageal reflux disease.      PROCEDURE PERFORMED:   1.  Laparoscopic lysis of adhesions.   2.  Laparoscopic jejunostomy feeding tube placement.   3.  Laparoscopic retrosternal gastric pull-through.   4.  Resection of the left half of the manubrium.   5.  Spit fistula takedown.   6.  Esophagogastroscopy.   7.  Flexible bronchoscopy.   8.  Right tube thoracostomy.   9.  Left pharyngostomy tube placement.      SURGEON:  Jens Wise MD (present and participated in the entire procedure).      RESIDENT SURGEON:  Alvin Dee MD      ANESTHESIA:  General.      ESTIMATED BLOOD LOSS:  300 mL.      COMPLICATIONS:  None immediate.      FINDINGS:  There were moderate adhesions in the left upper quadrant and particularly from the stomach to the transverse colon.  We had good reach of the stomach, a very viable well perfused stomach, reaching just above our line of transection of the manubrium in the second intercostal space.        BRIEF HISTORY:  Ms. Minerva Blanco underwent an antireflux procedure approximately 16 months ago and had a series of complications that eventually required a diversion to allow chronic mediastinal abscess to heal with the plan for eventual reconstruction.  She is here today for reconstruction.      INDICATIONS:  Risks and benefits of the procedure were described at length to the patient and her family, who had all their questions answered and agreed to proceed.      DESCRIPTION OF PROCEDURE:  With the patient in supine position under general anesthesia, the neck, chest and abdomen were prepared and draped in the conventional fashion.  We started with the abdominal cavity by first using open technique to place a 12 port in the right lower quadrant.  Following this, under laparoscopic guidance, we placed the remainder of the laparoscopic ports and then started taking down  adhesions.  We had to painstakingly take down adhesions between the stomach and the liver and between the stomach and the transverse colon, taking great care not to injure the colon and also to preserve the right gastroepiploic artery.  Of note, during our last procedure we had already tubularized the stomach and left in a G-tube.  The stomach was otherwise prepared for the gastric pull-up.      We then extended the subxiphoid incision a 4-5 cm transverse skin incision and incised the fascia vertically so we could place 2 fingers underneath the sternum to create a retrosternal tunnel.  We did this partial dissection and then placed a wound protector and pulled the gastric conduit up through the wound protector to ensure that we had sufficient length.  I did not inject Botox into the pylorus because it was somewhat challenging to identify the pylorus well during the procedure.  We decided that if this would be required we could do it at a later day endoscopically.      Following this, we made an incision in the old neck incision on the left side and brought it down onto the sternum to the level of the second intercostal space.  We then took down the spit fistula and were able to mobilize the esophagus without any difficulty and left the area of the original spit fistula open for packing on the skin.      We had excellent length of esophagus, which easily reached to the third intercostal space.  We then did a partial median sternotomy down to the level of the second intercostal space and transected the sternum laterally at the second intercostal space and then using rib cutters removed the left side of the manubrium.  This gave us enough room for anastomosis.  Next, we placed a stitch at the end of the gastric conduit and pulled it retrosternally, pulled the stitch back to behind the sternum and pulled it out.  We were able to pull the stomach up and we had to transect about 2-3 cm of the tip of where the previous  G-tube was.  We oversewed part of the staple line and then created an end-to-end hand sewn double layer anastomosis between the esophagus and the stomach.  We used 3-0 silks for the outside layer and interrupted 3-0 Vicryls for the inside layer.  Once we completed, we allowed the anastomosis to descend back into the mediastinum and it laid probably at about the level of the fourth intercostal space.  We then performed an endoscopy and identified there was no evidence of a leak.      After this, we placed a pharyngostomy tube behind the left tonsillar pillar and identified the site of her old pharyngostomy tube.  We made an incision and then passed a pharyngostomy into the pharynx and then over a wire placed it in the conduit and verified that it was in proper position with palpation of the distal conduit through the small subxiphoid incision.  Once I completed all of this, we secured the pharyngostomy and then irrigated the neck and a sternal incision copiously and closed with absorbable sutures in the conventional fashion.      After this, we closed the fascia of the subxiphoid incision and stapled the skin of the subxiphoid incision and then reinsufflated the abdomen.  We verified that there was no redundancy in the stomach and placed a single stitch between the serosa of the stomach and anterior abdominal wall to obliterate dead space.  We then proceeded with placement of the jejunostomy feeding tube.  It was quite straightforward to identify the ligament of Treitz and then we measured about 30 cm distal to the ligament of Treitz and using the EndoStitch tacked the small bowel to the abdominal wall.  We placed a needle through the abdominal wall into the lumen of the small bowel, passed a wire and then dilated with a 14 peel-away sheath and placed a 12-Salvadorean SEAN J-tube over the wire and through the peel-away sheath to the small bowel.  We placed a small pursestring at the site so we could secure it around the  J-tube and then finished tacking the J-tube site to the abdominal wall and placed an additional antiobstruction stitch about 4 cm distally.      We then ensured hemostasis and irrigated copiously and closed with absorbable sutures in the conventional fashion.  At the end, I decided not to place a chest tube at the left chest cavity since my assumption was that there going to be significant adhesions from a previous operation.  However, on the right side we made an anterolateral incision in the chest wall and entered the thoracic cavity, but there were significant adhesions of the lung all over.  I was able to place a 12 port and examined it thoracoscopically and we had enough room to place a 24-Vietnamese chest tube.      We then closed all the incisions with absorbable sutures in the conventional fashion.      At the end, we performed a flexible bronchoscopy for pulmonary toilet.      The patient tolerated the procedure well.         NIRALI OLPEZ MD             D: 2017 21:09   T: 2017 08:23   MT: [id number]      Name:     FAVIAN MALONE   MRN:      [id number]        Account:        [id number]   :      1945           Procedure Date: 2017      Document: [id number]       cc: [facility] Surgery Billing

## 2017-04-18 NOTE — PLAN OF CARE
Problem: Goal Outcome Summary  Goal: Goal Outcome Summary  Outcome: Improving  Pt admitted for neck dissection, spit fistula takedown, laparoscopic jejunostomy and pharyngectomy.      Neuro- A&Ox4, no numbness/tingling, R pupil 4mm, L pupil 2mm baseline from optic neuritis  CV- NSR 70-80's bpm,  SBPs , maintained MAP goals >60, required 500mL fluid bolus after metoprolol dose  Resp- Most of shift pt remained RA with capnography monitoring until 1600. Pt became intermittently lethargic throughout shift requiring 2L NC, O2 saturations had stayed 87-89% during this time. Pt currently 98% on RA  GI- NPO, J tube to gravity, disconnected intermittently following med administration, pharyngostomy to LIS, Q4 glucose monitoring with sliding scale insulin ordered, pt did not require any insulin throughout shift  - Whitt, positional, secured with statlock, 20-50mL/hr throughout shift with 1-2 occurrences over 100mL after fluids, MD aware, WCTM,  Skin- L neck incision to chest, liquid bandage, L upper chest has old spit fistula site, previously packed, now with gauze and tape per team, Multiple abdominal lap sites with primopore, *sternal precautions   Gtt- LR @ 50mL/hr  Pain- Oxycodone 10mg Q4, oxycodone 5mg Q3 PRN, dilaudid 0.2-0.3mg Q2 PRN, Dilaudid PCA DC'd today, Lidocaine patchx2, currently @ R shoulder for aches     Pt's PCA able to be DC'd today, pain controlled with oxycodone scheduled and dilaudid PRN. PT got pt up to chair (*sternal precautions, pt educated, continue to reinforce), SBasst. Plan to decrease pain and increase frequency up to chair. Continue to monitor urine output closely, notify MDs if output is less than or stays 15-20mL/hr. WCTM.

## 2017-04-18 NOTE — PLAN OF CARE
Problem: Goal Outcome Summary  Goal: Goal Outcome Summary  PT/4ab: PT eval completed. Instructed pt in post-surgical sternal precautions and how to mobilize safely while adhering to these precautions. Pt requiring min A for bed mobility, CGA for sit <>stand, bed > chair transfers + additional Ax1 for line mgmt.  Anticipate pt will progress to be able to disch home w/ spouse assisting as needed and continued home PT for further balance, strength, and posture training, as long as she regain her functional IND.

## 2017-04-19 ENCOUNTER — APPOINTMENT (OUTPATIENT)
Dept: GENERAL RADIOLOGY | Facility: CLINIC | Age: 72
DRG: 326 | End: 2017-04-19
Attending: THORACIC SURGERY (CARDIOTHORACIC VASCULAR SURGERY)
Payer: MEDICARE

## 2017-04-19 ENCOUNTER — APPOINTMENT (OUTPATIENT)
Dept: PHYSICAL THERAPY | Facility: CLINIC | Age: 72
DRG: 326 | End: 2017-04-19
Attending: THORACIC SURGERY (CARDIOTHORACIC VASCULAR SURGERY)
Payer: MEDICARE

## 2017-04-19 ENCOUNTER — APPOINTMENT (OUTPATIENT)
Dept: OCCUPATIONAL THERAPY | Facility: CLINIC | Age: 72
DRG: 326 | End: 2017-04-19
Attending: THORACIC SURGERY (CARDIOTHORACIC VASCULAR SURGERY)
Payer: MEDICARE

## 2017-04-19 LAB
ANION GAP SERPL CALCULATED.3IONS-SCNC: 8 MMOL/L (ref 3–14)
BUN SERPL-MCNC: 9 MG/DL (ref 7–30)
CALCIUM SERPL-MCNC: 8.3 MG/DL (ref 8.5–10.1)
CHLORIDE SERPL-SCNC: 106 MMOL/L (ref 94–109)
CO2 SERPL-SCNC: 26 MMOL/L (ref 20–32)
CREAT SERPL-MCNC: 0.42 MG/DL (ref 0.52–1.04)
ERYTHROCYTE [DISTWIDTH] IN BLOOD BY AUTOMATED COUNT: 17 % (ref 10–15)
GFR SERPL CREATININE-BSD FRML MDRD: ABNORMAL ML/MIN/1.7M2
GLUCOSE BLDC GLUCOMTR-MCNC: 101 MG/DL (ref 70–99)
GLUCOSE BLDC GLUCOMTR-MCNC: 131 MG/DL (ref 70–99)
GLUCOSE BLDC GLUCOMTR-MCNC: 77 MG/DL (ref 70–99)
GLUCOSE BLDC GLUCOMTR-MCNC: 82 MG/DL (ref 70–99)
GLUCOSE BLDC GLUCOMTR-MCNC: 83 MG/DL (ref 70–99)
GLUCOSE BLDC GLUCOMTR-MCNC: 94 MG/DL (ref 70–99)
GLUCOSE SERPL-MCNC: 94 MG/DL (ref 70–99)
HCT VFR BLD AUTO: 24.6 % (ref 35–47)
HGB BLD-MCNC: 7.1 G/DL (ref 11.7–15.7)
HGB BLD-MCNC: 7.2 G/DL (ref 11.7–15.7)
HGB BLD-MCNC: 7.6 G/DL (ref 11.7–15.7)
INR PPP: 1.92 (ref 0.86–1.14)
MAGNESIUM SERPL-MCNC: 1.8 MG/DL (ref 1.6–2.3)
MCH RBC QN AUTO: 26.5 PG (ref 26.5–33)
MCHC RBC AUTO-ENTMCNC: 30.9 G/DL (ref 31.5–36.5)
MCV RBC AUTO: 86 FL (ref 78–100)
PHOSPHATE SERPL-MCNC: 1.6 MG/DL (ref 2.5–4.5)
PLATELET # BLD AUTO: 273 10E9/L (ref 150–450)
POTASSIUM SERPL-SCNC: 3.4 MMOL/L (ref 3.4–5.3)
RADIOLOGIST FLAGS: ABNORMAL
RBC # BLD AUTO: 2.87 10E12/L (ref 3.8–5.2)
SODIUM SERPL-SCNC: 140 MMOL/L (ref 133–144)
WBC # BLD AUTO: 12.3 10E9/L (ref 4–11)

## 2017-04-19 PROCEDURE — 97530 THERAPEUTIC ACTIVITIES: CPT | Mod: GP | Performed by: PHYSICAL THERAPIST

## 2017-04-19 PROCEDURE — 25000132 ZZH RX MED GY IP 250 OP 250 PS 637: Mod: GY | Performed by: PHYSICIAN ASSISTANT

## 2017-04-19 PROCEDURE — 85027 COMPLETE CBC AUTOMATED: CPT | Performed by: SURGERY

## 2017-04-19 PROCEDURE — 85018 HEMOGLOBIN: CPT | Performed by: SURGERY

## 2017-04-19 PROCEDURE — 25000125 ZZHC RX 250: Performed by: PHYSICIAN ASSISTANT

## 2017-04-19 PROCEDURE — 83735 ASSAY OF MAGNESIUM: CPT | Performed by: SURGERY

## 2017-04-19 PROCEDURE — 97116 GAIT TRAINING THERAPY: CPT | Mod: GP | Performed by: PHYSICAL THERAPIST

## 2017-04-19 PROCEDURE — 40000986 XR CHEST PORT 1 VW

## 2017-04-19 PROCEDURE — 40000985 XR CHEST PORT 1 VW

## 2017-04-19 PROCEDURE — 25000132 ZZH RX MED GY IP 250 OP 250 PS 637: Mod: GY | Performed by: NURSE PRACTITIONER

## 2017-04-19 PROCEDURE — 97535 SELF CARE MNGMENT TRAINING: CPT | Mod: GO | Performed by: OCCUPATIONAL THERAPIST

## 2017-04-19 PROCEDURE — 71010 XR CHEST PORT 1 VW: CPT

## 2017-04-19 PROCEDURE — A9270 NON-COVERED ITEM OR SERVICE: HCPCS | Mod: GY | Performed by: SURGERY

## 2017-04-19 PROCEDURE — 25000132 ZZH RX MED GY IP 250 OP 250 PS 637: Mod: GY | Performed by: STUDENT IN AN ORGANIZED HEALTH CARE EDUCATION/TRAINING PROGRAM

## 2017-04-19 PROCEDURE — 94640 AIRWAY INHALATION TREATMENT: CPT | Mod: 76

## 2017-04-19 PROCEDURE — 40000275 ZZH STATISTIC RCP TIME EA 10 MIN

## 2017-04-19 PROCEDURE — 25000128 H RX IP 250 OP 636: Performed by: SURGERY

## 2017-04-19 PROCEDURE — 80048 BASIC METABOLIC PNL TOTAL CA: CPT | Performed by: SURGERY

## 2017-04-19 PROCEDURE — 85610 PROTHROMBIN TIME: CPT | Performed by: SURGERY

## 2017-04-19 PROCEDURE — 27210195 ZZH KIT POWER PICC DOUBLE LUMEN

## 2017-04-19 PROCEDURE — A9270 NON-COVERED ITEM OR SERVICE: HCPCS | Mod: GY | Performed by: NURSE PRACTITIONER

## 2017-04-19 PROCEDURE — 40000133 ZZH STATISTIC OT WARD VISIT: Performed by: OCCUPATIONAL THERAPIST

## 2017-04-19 PROCEDURE — 84100 ASSAY OF PHOSPHORUS: CPT | Performed by: SURGERY

## 2017-04-19 PROCEDURE — 99207 ZZC NO CHARGE FIRST FOLLOW UP PS: CPT

## 2017-04-19 PROCEDURE — 25000128 H RX IP 250 OP 636: Performed by: NURSE PRACTITIONER

## 2017-04-19 PROCEDURE — 25000125 ZZHC RX 250: Performed by: NURSE PRACTITIONER

## 2017-04-19 PROCEDURE — 85018 HEMOGLOBIN: CPT | Performed by: NURSE PRACTITIONER

## 2017-04-19 PROCEDURE — 40000196 ZZH STATISTIC RAPCV CVP MONITORING

## 2017-04-19 PROCEDURE — 25000125 ZZHC RX 250: Performed by: SURGERY

## 2017-04-19 PROCEDURE — 12000008 ZZH R&B INTERMEDIATE UMMC

## 2017-04-19 PROCEDURE — 25000132 ZZH RX MED GY IP 250 OP 250 PS 637: Mod: GY | Performed by: SURGERY

## 2017-04-19 PROCEDURE — 40000193 ZZH STATISTIC PT WARD VISIT: Performed by: PHYSICAL THERAPIST

## 2017-04-19 PROCEDURE — 97165 OT EVAL LOW COMPLEX 30 MIN: CPT | Mod: GO | Performed by: OCCUPATIONAL THERAPIST

## 2017-04-19 PROCEDURE — 36569 INSJ PICC 5 YR+ W/O IMAGING: CPT

## 2017-04-19 PROCEDURE — 00000146 ZZHCL STATISTIC GLUCOSE BY METER IP

## 2017-04-19 PROCEDURE — A9270 NON-COVERED ITEM OR SERVICE: HCPCS | Mod: GY | Performed by: STUDENT IN AN ORGANIZED HEALTH CARE EDUCATION/TRAINING PROGRAM

## 2017-04-19 RX ORDER — ONDANSETRON 2 MG/ML
4 INJECTION INTRAMUSCULAR; INTRAVENOUS EVERY 6 HOURS PRN
Status: DISCONTINUED | OUTPATIENT
Start: 2017-04-19 | End: 2017-04-28 | Stop reason: HOSPADM

## 2017-04-19 RX ORDER — ONDANSETRON 4 MG/1
4 TABLET, ORALLY DISINTEGRATING ORAL EVERY 6 HOURS PRN
Status: DISCONTINUED | OUTPATIENT
Start: 2017-04-19 | End: 2017-04-21

## 2017-04-19 RX ORDER — HEPARIN SODIUM,PORCINE 10 UNIT/ML
2-5 VIAL (ML) INTRAVENOUS
Status: DISCONTINUED | OUTPATIENT
Start: 2017-04-19 | End: 2017-04-22 | Stop reason: CLARIF

## 2017-04-19 RX ORDER — OXYCODONE HYDROCHLORIDE 5 MG/1
5 TABLET ORAL EVERY 4 HOURS
Status: DISCONTINUED | OUTPATIENT
Start: 2017-04-19 | End: 2017-04-19

## 2017-04-19 RX ORDER — HEPARIN SODIUM,PORCINE 10 UNIT/ML
5-10 VIAL (ML) INTRAVENOUS EVERY 24 HOURS
Status: DISCONTINUED | OUTPATIENT
Start: 2017-04-19 | End: 2017-04-28 | Stop reason: HOSPADM

## 2017-04-19 RX ORDER — GABAPENTIN 250 MG/5ML
200 SOLUTION ORAL EVERY 8 HOURS SCHEDULED
Status: DISCONTINUED | OUTPATIENT
Start: 2017-04-19 | End: 2017-04-26

## 2017-04-19 RX ORDER — HEPARIN SODIUM,PORCINE 10 UNIT/ML
5-10 VIAL (ML) INTRAVENOUS
Status: DISCONTINUED | OUTPATIENT
Start: 2017-04-19 | End: 2017-04-28 | Stop reason: HOSPADM

## 2017-04-19 RX ORDER — NALOXONE HYDROCHLORIDE 0.4 MG/ML
.1-.4 INJECTION, SOLUTION INTRAMUSCULAR; INTRAVENOUS; SUBCUTANEOUS
Status: DISCONTINUED | OUTPATIENT
Start: 2017-04-19 | End: 2017-04-19

## 2017-04-19 RX ORDER — METHOCARBAMOL 500 MG/1
500 TABLET, FILM COATED ORAL 4 TIMES DAILY
Status: DISCONTINUED | OUTPATIENT
Start: 2017-04-19 | End: 2017-04-28 | Stop reason: HOSPADM

## 2017-04-19 RX ORDER — LIDOCAINE 40 MG/G
CREAM TOPICAL
Status: DISCONTINUED | OUTPATIENT
Start: 2017-04-19 | End: 2017-04-22 | Stop reason: CLARIF

## 2017-04-19 RX ORDER — OXYCODONE HCL 5 MG/5 ML
5 SOLUTION, ORAL ORAL EVERY 4 HOURS
Status: DISCONTINUED | OUTPATIENT
Start: 2017-04-19 | End: 2017-04-20

## 2017-04-19 RX ADMIN — LEVALBUTEROL HYDROCHLORIDE 0.63 MG: 0.63 SOLUTION RESPIRATORY (INHALATION) at 20:34

## 2017-04-19 RX ADMIN — METHOCARBAMOL 500 MG: 500 TABLET ORAL at 12:50

## 2017-04-19 RX ADMIN — LEVALBUTEROL HYDROCHLORIDE 0.63 MG: 0.63 SOLUTION RESPIRATORY (INHALATION) at 16:41

## 2017-04-19 RX ADMIN — SODIUM CHLORIDE, PRESERVATIVE FREE 5 ML: 5 INJECTION INTRAVENOUS at 15:17

## 2017-04-19 RX ADMIN — OXYCODONE HYDROCHLORIDE 5 MG: 5 SOLUTION ORAL at 00:28

## 2017-04-19 RX ADMIN — SENNOSIDES A AND B 10 ML: 415.36 LIQUID ORAL at 19:53

## 2017-04-19 RX ADMIN — METHOCARBAMOL 500 MG: 500 TABLET ORAL at 08:07

## 2017-04-19 RX ADMIN — METHOCARBAMOL 500 MG: 500 TABLET ORAL at 15:16

## 2017-04-19 RX ADMIN — Medication 2 G: at 09:14

## 2017-04-19 RX ADMIN — SODIUM CHLORIDE SOLN NEBU 3% 3 ML: 3 NEBU SOLN at 16:41

## 2017-04-19 RX ADMIN — CHLORASEPTIC 1 ML: 1.5 LIQUID ORAL at 08:13

## 2017-04-19 RX ADMIN — GABAPENTIN 200 MG: 250 SUSPENSION ORAL at 22:24

## 2017-04-19 RX ADMIN — SODIUM CHLORIDE SOLN NEBU 3% 3 ML: 3 NEBU SOLN at 20:34

## 2017-04-19 RX ADMIN — OXYCODONE HYDROCHLORIDE 5 MG: 5 SOLUTION ORAL at 05:14

## 2017-04-19 RX ADMIN — LEVALBUTEROL HYDROCHLORIDE 0.63 MG: 0.63 SOLUTION RESPIRATORY (INHALATION) at 08:24

## 2017-04-19 RX ADMIN — OXYCODONE HYDROCHLORIDE 5 MG: 5 TABLET ORAL at 15:04

## 2017-04-19 RX ADMIN — LIDOCAINE 2 PATCH: 50 PATCH TOPICAL at 09:14

## 2017-04-19 RX ADMIN — METHOCARBAMOL 500 MG: 500 TABLET ORAL at 19:52

## 2017-04-19 RX ADMIN — POTASSIUM PHOSPHATE, MONOBASIC AND POTASSIUM PHOSPHATE, DIBASIC 20 MMOL: 224; 236 INJECTION, SOLUTION INTRAVENOUS at 09:13

## 2017-04-19 RX ADMIN — HEPARIN SODIUM 5000 UNITS: 5000 INJECTION, SOLUTION INTRAVENOUS; SUBCUTANEOUS at 19:53

## 2017-04-19 RX ADMIN — ACETAMINOPHEN 975 MG: 325 SOLUTION ORAL at 08:06

## 2017-04-19 RX ADMIN — PANTOPRAZOLE SODIUM 40 MG: 40 TABLET, DELAYED RELEASE ORAL at 08:11

## 2017-04-19 RX ADMIN — ACETAMINOPHEN 975 MG: 325 SOLUTION ORAL at 00:28

## 2017-04-19 RX ADMIN — SENNOSIDES A AND B 10 ML: 415.36 LIQUID ORAL at 08:06

## 2017-04-19 RX ADMIN — OXYCODONE HYDROCHLORIDE 10 MG: 5 SOLUTION ORAL at 06:19

## 2017-04-19 RX ADMIN — SODIUM CHLORIDE SOLN NEBU 3% 3 ML: 3 NEBU SOLN at 13:17

## 2017-04-19 RX ADMIN — ANTISEPTIC SURGICAL SCRUB: 0.04 SOLUTION TOPICAL at 19:53

## 2017-04-19 RX ADMIN — DOXEPIN HYDROCHLORIDE 3 MG: 10 SOLUTION ORAL at 22:23

## 2017-04-19 RX ADMIN — HEPARIN SODIUM 5000 UNITS: 5000 INJECTION, SOLUTION INTRAVENOUS; SUBCUTANEOUS at 12:50

## 2017-04-19 RX ADMIN — HYDROMORPHONE HYDROCHLORIDE: 10 INJECTION, SOLUTION INTRAMUSCULAR; INTRAVENOUS; SUBCUTANEOUS at 12:35

## 2017-04-19 RX ADMIN — SODIUM CHLORIDE SOLN NEBU 3% 3 ML: 3 NEBU SOLN at 08:24

## 2017-04-19 RX ADMIN — GABAPENTIN 100 MG: 250 SOLUTION ORAL at 06:19

## 2017-04-19 RX ADMIN — CHLORHEXIDINE GLUCONATE 15 ML: 1.2 RINSE ORAL at 08:10

## 2017-04-19 RX ADMIN — OXYCODONE HYDROCHLORIDE 5 MG: 5 SOLUTION ORAL at 18:46

## 2017-04-19 RX ADMIN — OXYCODONE HYDROCHLORIDE 5 MG: 5 SOLUTION ORAL at 08:07

## 2017-04-19 RX ADMIN — OXYCODONE HYDROCHLORIDE 5 MG: 5 SOLUTION ORAL at 11:25

## 2017-04-19 RX ADMIN — GABAPENTIN 200 MG: 250 SUSPENSION ORAL at 14:19

## 2017-04-19 RX ADMIN — METOPROLOL TARTRATE 12.5 MG: 100 TABLET ORAL at 08:13

## 2017-04-19 RX ADMIN — HYDROMORPHONE HYDROCHLORIDE: 10 INJECTION, SOLUTION INTRAMUSCULAR; INTRAVENOUS; SUBCUTANEOUS at 14:23

## 2017-04-19 RX ADMIN — CHLORHEXIDINE GLUCONATE 15 ML: 1.2 RINSE ORAL at 19:53

## 2017-04-19 RX ADMIN — LEVALBUTEROL HYDROCHLORIDE 0.63 MG: 0.63 SOLUTION RESPIRATORY (INHALATION) at 13:17

## 2017-04-19 RX ADMIN — ACETAMINOPHEN 975 MG: 325 SOLUTION ORAL at 15:16

## 2017-04-19 RX ADMIN — OXYCODONE HYDROCHLORIDE 10 MG: 5 SOLUTION ORAL at 02:01

## 2017-04-19 RX ADMIN — HYDROMORPHONE HYDROCHLORIDE 0.4 MG: 10 INJECTION, SOLUTION INTRAMUSCULAR; INTRAVENOUS; SUBCUTANEOUS at 11:25

## 2017-04-19 RX ADMIN — LIDOCAINE HYDROCHLORIDE 3 ML: 10 INJECTION, SOLUTION INFILTRATION; PERINEURAL at 11:18

## 2017-04-19 RX ADMIN — ANTISEPTIC SURGICAL SCRUB: 0.04 SOLUTION TOPICAL at 09:16

## 2017-04-19 RX ADMIN — HYDROMORPHONE HYDROCHLORIDE 0.4 MG: 10 INJECTION, SOLUTION INTRAMUSCULAR; INTRAVENOUS; SUBCUTANEOUS at 04:17

## 2017-04-19 RX ADMIN — POTASSIUM CHLORIDE 20 MEQ: 29.8 INJECTION, SOLUTION INTRAVENOUS at 05:07

## 2017-04-19 RX ADMIN — METOPROLOL TARTRATE 12.5 MG: 100 TABLET ORAL at 19:53

## 2017-04-19 RX ADMIN — HEPARIN SODIUM 5000 UNITS: 5000 INJECTION, SOLUTION INTRAVENOUS; SUBCUTANEOUS at 03:46

## 2017-04-19 ASSESSMENT — PAIN DESCRIPTION - DESCRIPTORS
DESCRIPTORS: CRAMPING
DESCRIPTORS: CRAMPING

## 2017-04-19 ASSESSMENT — ACTIVITIES OF DAILY LIVING (ADL): IADL_COMMENTS: FAMILY ABLE TO ASSIST WITH IADLS PRN

## 2017-04-19 NOTE — PLAN OF CARE
N: Drowsy, wakes easily, AOx4. DUNBAR equally, general weakness. R pupil 4, L pupil 2 at baseline. Walked out of room with PT with use of walker.  CV: NSR, tachy at times with activity. No afib today. BP stable 100-130s. CVP this AM = 3. Afebrile.  R: Room air. Rate in upper 20s and shallow at times with pain. Lungs clear to diminished. Right chest tube now to water seal with minimal serosanguinous output, air leak, no crepitus.  GI: Pharyngostomy tube to LIS with 550 bile in cannister. J tube to gravity, used for meds. Minimal bile output.  : Whitt with adequate output.  Skin: LUE dissection incision ROX with liquid bandage, edges approximated, , some erythema noted. Lap sites intact and ROX.  Lines: Right IJ and PIV x3 removed. LUE PICC inserted. LR @ 50, Mag and Phos replaced.   Pain: Dilaudid PCA with 0.2 dose initiated. PRN meds discontinued. Increased robaxin and neurontin. Decreased scheduled oxycodone. Pain seems better controlled.    Plan: Pain control. Mobilize. Transfer to . Notify team of changes.

## 2017-04-19 NOTE — PROGRESS NOTES
04/19/17 1200   Quick Adds   Type of Visit Initial Occupational Therapy Evaluation   Living Environment   Lives With spouse   Living Arrangements condominium   Home Accessibility no concerns   Number of Stairs to Enter Home 0   Number of Stairs Within Home 0   Stair Railings at Home none   Transportation Available family or friend will provide   Living Environment Comment Pt  provides transportation; moved into fully accessible condo in April 2016; has built in seat in shower   Self-Care   Dominant Hand right   Usual Activity Tolerance good   Current Activity Tolerance fair   Regular Exercise yes   Activity/Exercise Type (had started home PT to address strength and posture)   Equipment Currently Used at Home none   Activity/Exercise/Self-Care Comment Pt reports she was previously independent with mobility and basic self cares. Pt  works from home and is available prn. Pt reports she does not use an AD for mobility though does have modified gait with forward posture due to chronic pain issues. Pt reports [place] has been well managed for well over a year. pt reports she is able to ambulate throughout a store etc. without concern at baseline   Functional Level Prior   Ambulation 0-->independent   Transferring 0-->independent   Toileting 0-->independent   Bathing 1-->assistive equipment   Dressing 0-->independent   Eating 0-->independent   Communication 0-->understands/communicates without difficulty   Cognition 0 - no cognition issues reported   Fall history within last six months no   Which of the above functional risks had a recent onset or change? 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   Precautions/Limitations sternal precautions   General Observations Pt pleasant and cooperative; limited by pain, CT to suction,  present and supportive   General Info Comments Activity: Ambulate TID and up to chair   Cognitive Status Examination   Orientation orientation to person, place and time   Level of Consciousness alert   Able to Follow Commands WNL/WFL   Personal Safety (Cognitive) WNL/WFL   Memory impaired   Attention No deficits were identified   Cognitive Comment Pt alert, oriented and generally appropriate in conversation.  Pt seems to have mild memory deficits during conversation; however, may be related to pain/medication, will monitor   Visual Perception   Visual Perception Comments no acute changes noted   Sensory Examination   Sensory Quick Adds No deficits were identified   Pain Assessment   Patient Currently in Pain (pt reports both generalized and incisional pain)   Integumentary/Edema   Integumentary/Edema no deficits were identifed   Range of Motion (ROM)   ROM Comment BUE WFL to 90 degrees at shoulder   Strength   Strength Comments not formally assessed 2/2 post surgical precautions; however, pt demonstrates appropriate functional strength though generally deconditioned   Mobility   Bed Mobility Bed mobility skill: Sit to supine   Bed Mobility Skill: Sit to Supine   Level of [place]: Sit/Supine minimum assist (75% patients effort)   Physical Assist/Nonphysical Assist: Sit/Supine 1 person + 1 person to manage equipment   Transfer Skill: Bed to Chair/Chair to Bed   Level of [place]: Bed to Chair contact guard   Physical Assist/Nonphysical Assist: Bed to Chair 1 person + 1 person to manage  equipment   Transfer Skill: Sit to Stand   Level of [place]: Sit/Stand contact guard   Physical Assist/Nonphysical Assist: Sit/Stand 1 person assist   Tub/Shower Transfer   Tub/Shower Transfer Comments pt has walk in shower with built in seat   Balance   Balance Comments no signficant LOB with bedside transfers   Lower Body Dressing   Level of [place]: Dress Lower Body moderate assist (50% patients effort)   Instrumental Activities of Daily Living (IADL)   IADL Comments family able to assist with IADLs prn   Activities of Daily Living Analysis   Impairments Contributing to Impaired Activities of Daily Living balance impaired;pain;post surgical precautions;strength decreased   General Therapy Interventions   Planned Therapy Interventions ADL retraining;IADL retraining;bed mobility training;ROM;strengthening;transfer training   Clinical Impression   Criteria for Skilled Therapeutic Interventions Met yes, treatment indicated   OT Diagnosis decreased activity tolerance and independence with ADLs   Influenced by the following impairments fatigue, pain, post surgical precautions, generalized deconditioning, decreased strength   Assessment of Occupational Performance 3-5 Performance Deficits   Identified Performance Deficits bed mobility, toileting, dressing, bathing   Clinical Decision Making (Complexity) Low complexity   Therapy Frequency 5 times/wk   Predicted Duration of Therapy Intervention (days/wks) 4/25/17   Anticipated Discharge Disposition Home with Home Therapy   Risks and Benefits of Treatment have been explained. Yes   Patient, Family & other staff in agreement with plan of care Yes   Total Evaluation Time   Total Evaluation Time (Minutes) 5

## 2017-04-19 NOTE — CONSULTS
"Inpatient Pain Management Service: Consultation      DATE OF CONSULT: 4/18/2017      REASON FOR PAIN CONSULTATION:  Minerva Blanco is a 72 year old female I am seeing in consultation at the request of Thoracic Surgery  for evaluation and recommendations for her acute on chronic pain.       CHIEF PAIN COMPLAINT: \"my pain is not well controlled at all\"      ASSESSMENT:   1.) Acute Post Operative Pain  2.) Chronic Esophogeal perforation  3.) Multiple Sclerosis  4.) Hx of chronic continuous use of opioids.    -- Outpatient opioids prior to admission Oxycodone 5-10mg PO q6hrs.      TREATMENT RECOMMENDATIONS/PLAN:   1.) DC Hydromorphone PCA - she notes that it is causing her to be sedated and then she wakes up in pain.   Do not recommend scheduled opioids on top of PCA.  2.) Continue Scheduled Oxycodone 10mg liquid q4hrs.  3.) Recommend increasing Oxycodone 5mg q3hrs prn breakthrough pain.  4.) Continue Co-analgesics: Methocarbamol 500mg IV TID (can increase to 750mg if muscle cramps persist), Tylenol 975 q8, lidoderm patches, Gabapentin 100mg q8hrs.  5.) Consider hurricane spray for sore throat  6.) Recommend DC of central access when able as she complains that this is causing considerable discomfort for her.    Pain team will continue to follow.    Mike Enamorado DO    [facility]  Pain Management  Department of Anesthesiology      Thank you for consulting the Inpatient Pain Management Service.   The above recommendations are to be acted upon at the primary team s discretion.    To reach us:  Mon - Friday 8 AM - 3 PM: Pager [phone]   After hours, weekends and holidays: Primary service should call [phone] for the on-call pain specialist    HISTORY OF PRESENT ILLNESS: Minerva Blanco is a 72 year old female with history of chronic espophageal perforation s/p mor funduplication, PAF, MS and chronic anemia, admitted 4/17/17 for, Neck Dissection, Spit Fistula Takedown, " Laparoscopic Jejunostomy Tube and Pharyngostomy Tube, Gastric Pull up, Upper Endoscopy(EGD), Flexible Bronchoscopy and Sternotomy.   She has a history of chronic opioid use for her past conditions and has been weaned down to small doses of short acting oxycodone by the time of this surgery.  She has been struggling with pain control post operatively, however, and the pain team has been consulted to assist with recommendations.    PAIN HISTORY:   Location: Right neck were CVC inserts (most bothersome), sore throat, left flank incisional pain, abdominal muscle spasms.  Duration: days  Pain intensity: 8/10  Quality of the pain: stabbing and cramping  Aggravating factors: movement  Relieving factors : medications    CAPA (Clinically Aligned Pain Assessment)  Comfort (How is your pain?): Tolerable with discomfort  Change in Pain (Since your last medication/intervention?): Getting worse  Pain Control (How are your pain treatments working?): Partially effective pain control  Functioning (Are you able to do activities to get better?) : Pain keeps me from doing most of what I need to do  Sleep (Does your pain management allow you to sleep or rest?): Awake with occasional pain       FUNCTIONAL STATUS:  Change:      getting worse  Oral intake:     NPO  Bowel function:    No bowel movements since surgery  Activity level:     Up in chair  Sleep:      No concerns, sleeps well through night  Mood:      adjusting to situation      REVIEW OF 10 BODY SYSTEMS: 10 point ROS of systems including Constitutional, Eyes, Respiratory, Cardiovascular, Gastroenterology, Genitourinary, Integumentary, Musculoskeletal, Psychiatric were all negative except for pertinent positives noted in my HPI.       INPATIENT MEDICATIONS PERTINENT TO PAIN CONSULT:         CURRENT MEDICATIONS:   Current Facility-Administered Medications Ordered in Epic   Medication Dose Route Frequency Provider Last Rate Last Dose     ondansetron (ZOFRAN-ODT) ODT tab 4 mg  4 mg  Oral Q6H PRN Alvin Dee MD        Or     ondansetron (ZOFRAN) injection 4 mg  4 mg Intravenous Q6H PRN Alvin Dee MD         HYDROmorphone (DILAUDID) PCA 1 mg/mL   Intravenous PCA Alvin Dee MD         lidocaine (LMX4) kit   Topical Once PRN Alvin Dee MD         heparin lock flush 10 UNIT/ML injection 2-5 mL  2-5 mL Intracatheter Once PRN Alvin Dee MD         gabapentin (NEURONTIN) solution 200 mg  200 mg Oral Q8H [facility] Giorgi Husain, HAYDER         methocarbamol (ROBAXIN) tablet 500 mg  500 mg Oral 4x Daily Giorgi Husain NP   500 mg at 04/19/17 1250     oxyCODONE (ROXICODONE) IR tablet 5 mg  5 mg Oral or Feeding Tube Q4H Giorgi Husain, HAYDER         lidocaine 1 % 1 mL  1 mL Other Q1H PRN Alvin Dee MD         lidocaine (LMX4) kit   Topical Q1H PRN Alvin Dee MD         sodium chloride (PF) 0.9% PF flush 3 mL  3 mL Intracatheter Q1H PRN Alvin Dee MD         sodium chloride (PF) 0.9% PF flush 3 mL  3 mL Intracatheter Q8H Alvin Dee MD   3 mL at 04/19/17 0811     lidocaine (LIDODERM) 5 % Patch 1-2 patch  1-2 patch Transdermal Q24h Alvin Dee MD   2 patch at 04/19/17 0914    And     lidocaine (LIDODERM) patch REMOVAL   Transdermal Q24H Alvin Dee MD        And     lidocaine (LIDODERM) Patch in Place   Transdermal Q8H Alvin Dee MD         potassium chloride 10 mEq in 100 mL intermittent infusion  10 mEq Intravenous Q1H Alvin Fierro MD         potassium chloride 10 mEq in 100 mL intermittent infusion with 10 mg lidocaine  10 mEq Intravenous Q1H Alvin Fierro MD         potassium chloride 20 mEq in 50 mL intermittent infusion  20 mEq Intravenous Q1H PRAlvin Mcallister MD 25 mL/hr at 04/19/17 0507 20 mEq at 04/19/17 0507     potassium phosphate 10 mmol in D5W 250 mL intermittent infusion  10 mmol Intravenous Daily PRN Alvin Dee MD         potassium phosphate 15 mmol in D5W 250 mL intermittent infusion  15 mmol Intravenous Daily  PRN Alvin Dee MD         potassium phosphate 20 mmol in D5W 500 mL intermittent infusion  20 mmol Intravenous Q6H JILLIANN Alvin Dee MD   20 mmol at 04/19/17 0913     potassium phosphate 20 mmol in D5W 250 mL intermittent infusion  20 mmol Intravenous Q6H PRN Alvin Dee MD         potassium phosphate 25 mmol in D5W 500 mL intermittent infusion  25 mmol Intravenous Q8H PRN Alvin Dee MD         chlorhexidine (HIBICLENS) 4 % liquid   Topical BID Alvin Dee MD         chlorhexidine (PERIDEX) 0.12 % solution 15 mL  15 mL Swish & Spit BID Alvin Dee MD   15 mL at 04/19/17 0810     metoprolol (LOPRESSOR) suspension 12.5 mg  12.5 mg Per J Tube BID Justice Mohan MD   12.5 mg at 04/19/17 0813     heparin sodium PF injection 5,000 Units  5,000 Units Subcutaneous Q8H Alvin Dee MD   5,000 Units at 04/19/17 1250     magnesium sulfate 4 g in 100 mL sterile water (premade)  4 g Intravenous Q4H PRN Sobia Scott APRN CNP         doxepin (SINEquan) 10 MG/ML (HIGH CONC) solution 3 mg  3 mg Oral At Bedtime Giorgi Husain NP   3 mg at 04/18/17 2128     magnesium sulfate 2 g in NS intermittent infusion (PharMEDium or FV Cmpd)  2 g Intravenous Daily PRN Sobia Scott APRN CNP   2 g at 04/19/17 0914     pantoprazole (PROTONIX) suspension 40 mg  40 mg Oral or Feeding Tube Daily Andrea Knapp MD   40 mg at 04/19/17 0811     levalbuterol (XOPENEX) neb solution 0.63 mg  0.63 mg Nebulization Q4H [place] Saul Rogers PA-C   0.63 mg at 04/19/17 1317     sodium chloride 3 % neb solution 3 mL  3 mL Nebulization Q4H While awake Saul Rogers PA-C   3 mL at 04/19/17 1317     acetaminophen (TYLENOL) solution 975 mg  975 mg Oral Q8H Giorgi Husain NP   975 mg at 04/19/17 0806     sennosides (SENOKOT) syrup 10 mL  10 mL Per J Tube BID Alvin Dee MD   10 mL at 04/19/17 0806     docusate (COLACE) 50 MG/5ML liquid 100 mg  100 mg Per J Tube At Bedtime Alvin Dee,  MD         phenol (CHLORASEPTIC) 1.4 % spray 1 mL  1 spray Mouth/Throat Q1H PRN Sobia Scott APRN CNP   1 mL at 04/19/17 0813     lactated ringers infusion   Intravenous Continuous Alvin Dee MD 50 mL/hr at 04/19/17 1300       glucose 40 % gel 15-30 g  15-30 g Oral Q15 Min PRN Arslan Wisdom MD        Or     dextrose 50 % injection 25-50 mL  25-50 mL Intravenous Q15 Min PRN Arslan Wisdom MD        Or     glucagon injection 1 mg  1 mg Subcutaneous Q15 Min PRN Arslan Wisdom MD         naloxone (NARCAN) injection 0.1-0.4 mg  0.1-0.4 mg Intravenous Q2 Min PRN Arslan Wisdom MD         insulin aspart (NovoLOG) inj (RAPID ACTING)  1-6 Units Subcutaneous Q4H Arslan Wisdom MD         prochlorperazine (COMPAZINE) injection 5 mg  5 mg Intravenous Q6H PRN Arslan Wisdom MD        Or     prochlorperazine (COMPAZINE) tablet 5 mg  5 mg Oral Q6H PRN Arslan Wisdom MD        Or     prochlorperazine (COMPAZINE) Suppository 12.5 mg  12.5 mg Rectal Q12H PRN Arslan Wisdom MD         bupivacaine 0.25 % - EPINEPHrine 1:200,000 injection    PRN Steven Perez MD   20 mL at 01/21/17 1255     No current [facility]-ordered outpatient prescriptions on file.           PREADMISSION PAIN MEDICATIONS:         OUTPATIENT OPIOIDS PRESCRIBED BY:      PRIMARY CARE PROVIDER: Andrea Nino  PAIN SPECIALIST:     [place] [facility] database reviewed:       HOME/PREVIOUS MEDICATIONS:   Prior to Admission medications    Medication Sig Start Date End Date Taking? Authorizing Provider   DiphenhydrAMINE HCl (BENADRYL PO) Take 25 mg by mouth nightly as needed   Yes Unknown, Entered By History   ACETAMINOPHEN PO Take 500-1,000 mg by mouth every 8 hours as needed for pain   Yes Unknown, Entered By History   OXYCODONE HCL PO Take 5-10 mg by mouth every 6 hours as needed   Yes Unknown, Entered By History   cholestyramine (QUESTRAN) 4 G Packet Take 1 packet by mouth daily   Yes Unknown, Entered By History   multivitamins with minerals  (CERTAVITE/CEROVITE) LIQD liquid Take 15 mLs by mouth daily   Yes Unknown, Entered By History   doxepin (SILENOR) 3 MG tablet Take 1 tablet (3 mg) by mouth nightly as needed for sleep 4/10/17  Yes Sarina Serna APRN CNS   nystatin POWD Apply to peristomal skin with each pouch change until rash is resolved 2/27/17  Yes Sarina Serna APRN CNS   metoprolol (LOPRESSOR) 50 MG tablet 50 mg BID.  May crush, mix with water and administer via feeding tube  Patient taking differently: 25 mg by Per G Tube route 2 times daily May crush, mix with water and administer via feeding tube 2/6/17  Yes Tasia Rivera APRN CNS   Zinc Sulfate 220 (50 ZN) MG TABS 220 mg daily.  OK to crush, mix with water and administer via feeding tube. 2/6/17  Yes Tasia Rivera APRN CNS   traZODone (DESYREL) 100 MG tablet 1 tablet (100 mg) by Per G Tube route At Bedtime 1/25/17  Yes Saul Rogers PA-C   loperamide (IMODIUM) 1 MG/5ML liquid Take 20 mLs (4 mg) by mouth 4 times daily as needed for diarrhea  Patient taking differently: Take 4 mg by mouth 2 times daily  1/25/17  Yes Saul Rogers PA-C   Lactobacillus Rhamnosus, GG, (CULTURELLE PO) Take 1 tablet by mouth 2 times daily    Yes Reported, Patient   warfarin (COUMADIN) 2.5 MG tablet Take 1 tablet (2.5 mg) by mouth daily  Patient taking differently: Take 1.25 mg by mouth on Wednesday and take 2.5 mg by mouth on all other days of the week. 2/22/17   Sandra John APRN CNP           PAST PAIN TREATMENT:         ALLERGIES:    Allergies   Allergen Reactions     Amoxicillin Diarrhea     Ativan [Lorazepam] Other (See Comments)     Hallucinations     Hydromorphone Itching     4/12/17 - patient open to using this as she tolerated Hydromorphone PCA during hospitalization in January 2017.      Morphine Itching            PAST MEDICAL AND PSYCHIATRIC HISTORY:    Past Medical History:   Diagnosis Date     Atrial fibrillation (H)      Breast cancer  (H) 2007    right side - lumpectomy, chemo, and local radiation     Esophageal perforation      Fibromyalgia      GERD (gastroesophageal reflux disease)      Hiatal hernia      History of blood transfusion      IBS (irritable bowel syndrome)      Meniere's disease     deaf left ear     MS (multiple sclerosis) (H)            PAST SURGICAL HISTORY:   Past Surgical History:   Procedure Laterality Date     APPENDECTOMY       BACK SURGERY  5/1/2015    lumbar fusion     BRONCHOSCOPY FLEXIBLE N/A 4/17/2017    Procedure: BRONCHOSCOPY FLEXIBLE;;  Surgeon: Jens Wise MD;  Location: [facility]     C GASTROSTOMY/JEJUN TUBE      J tube for feedings     CLOSE SPIT FISTULA N/A 4/17/2017    Procedure: CLOSE SPIT FISTULA;;  Surgeon: Jens Wise MD;  Location[facility]     COMBINED ESOPHAGOSCOPY, GASTROSCOPY, DUODENOSCOPY (EGD), REMOVE ESOPHAGEAL STENT N/A 8/26/2016    Procedure: COMBINED ESOPHAGOSCOPY, GASTROSCOPY, DUODENOSCOPY (EGD), REMOVE ESOPHAGEAL STENT;  Surgeon: Jens Wise MD;  [facility]     CREATE SPIT FISTULA N/A 1/9/2017    Procedure: CREATE SPIT FISTULA;  Surgeon: Jnes Wise MD;  [facility]     ESOPHAGECTOMY N/A 1/9/2017    Procedure: ESOPHAGECTOMY;  Surgeon: Jens Wise MD;  [facility]     ESOPHAGOSCOPY, GASTROSCOPY, DUODENOSCOPY (EGD), COMBINED N/A 4/18/2016    Procedure: COMBINED ESOPHAGOSCOPY, GASTROSCOPY, DUODENOSCOPY (EGD);  Surgeon: Alexis Barraza MD;  [facility]     ESOPHAGOSCOPY, GASTROSCOPY, DUODENOSCOPY (EGD), COMBINED N/A 4/25/2016    Procedure: COMBINED ESOPHAGOSCOPY, GASTROSCOPY, DUODENOSCOPY (EGD);  Surgeon: Alexis Barraza MD;  [facility]     ESOPHAGOSCOPY, GASTROSCOPY, DUODENOSCOPY (EGD), COMBINED N/A 5/4/2016    Procedure: COMBINED ESOPHAGOSCOPY, GASTROSCOPY, DUODENOSCOPY (EGD);  Surgeon: Alexis Barraza MD;  [facility]     ESOPHAGOSCOPY, GASTROSCOPY, DUODENOSCOPY (EGD),  COMBINED N/A 5/18/2016    Procedure: COMBINED ESOPHAGOSCOPY, GASTROSCOPY, DUODENOSCOPY (EGD);  Surgeon: Alexis Barraza MD;  [facility]     ESOPHAGOSCOPY, GASTROSCOPY, DUODENOSCOPY (EGD), COMBINED N/A 6/22/2016    Procedure: COMBINED ESOPHAGOSCOPY, GASTROSCOPY, DUODENOSCOPY (EGD);  Surgeon: Alexis Barraza MD;  [facility]     ESOPHAGOSCOPY, GASTROSCOPY, DUODENOSCOPY (EGD), COMBINED N/A 7/12/2016    Procedure: COMBINED ESOPHAGOSCOPY, GASTROSCOPY, DUODENOSCOPY (EGD);  Surgeon: Jens Wise MD;  [facility]     ESOPHAGOSCOPY, GASTROSCOPY, DUODENOSCOPY (EGD), DILATATION, COMBINED N/A 6/29/2016    Procedure: COMBINED ESOPHAGOSCOPY, GASTROSCOPY, DUODENOSCOPY (EGD), DILATATION;  Surgeon: Jens Wise MD;  [facility]     HC UGI ENDOSCOPY W TRANSENDOSCOPIC STENT PLACEMENT N/A 7/12/2016    Procedure: COMBINED ESOPHAGOSCOPY, GASTROSCOPY, DUODENOSCOPY (EGD), PLACE TRANSENDOSCOPIC ESOPHAGEAL STENT;  Surgeon: Jens Wise MD;  [facility]     HC UGI ENDOSCOPY W TRANSENDOSCOPIC STENT PLACEMENT N/A 7/22/2016    Procedure: COMBINED ESOPHAGOSCOPY, GASTROSCOPY, DUODENOSCOPY (EGD), PLACE TRANSENDOSCOPIC ESOPHAGEAL STENT;  Surgeon: Jens Wise MD;  [facility]     IRRIGATION AND DEBRIDEMENT CHEST WASHOUT, COMBINED N/A 6/29/2016    Procedure: COMBINED IRRIGATION AND DEBRIDEMENT CHEST WASHOUT;  Surgeon: Jens Wise MD;  [facility]     LAPAROSCOPIC ASSISTED INSERTION TUBE JEJUNOSTOMY N/A 4/17/2017    Procedure: LAPAROSCOPIC ASSISTED INSERTION TUBE JEJUNOSTOMY;;  Surgeon: Jens Wise MD;  [facility]     LAPAROSCOPY DIAGNOSTIC (GENERAL) N/A 1/9/2017    Procedure: LAPAROSCOPY DIAGNOSTIC (GENERAL);  Surgeon: Jens Wise MD;  [facility]     LAPAROSCOPY DIAGNOSTIC (GENERAL) N/A 4/17/2017    Procedure: LAPAROSCOPY DIAGNOSTIC (GENERAL);  Laparoscopic , Neck Dissection, Spit Fistula Takedown,  "Laparoscopic Jejunostomy Tube and Pharyngostomy Tube, Gastric Pull up, Upper Endoscopy(EGD)  , Flexible Bronchoscopy ,Sternotomy ;  Surgeon: Jens Wise MD;  [facility]     LUMPECTOMY BREAST Right 2007     NERVE BLOCK PERIPHERAL N/A 8/30/2016    Procedure: NERVE BLOCK PERIPHERAL;  Surgeon: GENERIC ANESTHESIA PROVIDER;  Location: [facility]     NISSEN FUNDOPLICATION  1/2016     PHARYNGOSTOMY N/A 4/18/2016    Procedure: PHARYNGOSTOMY;  Surgeon: Alexis Barraza MD;  [facility]     PHARYNGOSTOMY N/A 4/25/2016    Procedure: PHARYNGOSTOMY;  Surgeon: Alexis Barraza MD;  [facility]     PHARYNGOSTOMY N/A 5/4/2016    Procedure: PHARYNGOSTOMY;  Surgeon: Alexis Barraza MD;  [facility]     PHARYNGOSTOMY N/A 6/22/2016    Procedure: PHARYNGOSTOMY;  Surgeon: Alexis Barraza MD;  [facility]     PHARYNGOSTOMY N/A 6/29/2016    Procedure: PHARYNGOSTOMY;  Surgeon: Jens Wise MD;  [facility]     PICC INSERTION Left 8/25/2016    5fr DL BioFlo PICC, 42cm (3cm external) in the L medial brachial vein w/ tip in the SVC RA junction.     THORACOTOMY Right 1998    lung infection - \"hard crust formed on lung\"     THORACOTOMY Left 1/9/2017    Procedure: THORACOTOMY;  Surgeon: Jens Wise MD;  Location[facility]     WRIST SURGERY             FAMILY HISTORY: family history includes Alzheimer Disease in her mother; Breast Cancer in her daughter; CEREBROVASCULAR DISEASE in her father; Ovarian Cancer in her sister.      HEALTH & LIFESTYLE PRACTICES:   Tobacco:  reports that she quit smoking about 19 years ago. Her smoking use included Cigarettes. She has a 15.00 pack-year smoking history. She does not have any smokeless tobacco history on file.  Alcohol:  reports that she does not drink alcohol.  Illicit drugs:  reports that she does not use illicit drugs.      SOCIAL HISTORY:   Social History     Social History     Marital status:      " Spouse name: richard     Number of children: 2     Years of education: N/A     Occupational History     retired       Social History Main Topics     Smoking status: Former Smoker     Packs/day: 1.00     Years: 15.00     Types: Cigarettes     Quit date: 1/1/1998     Smokeless tobacco: None     Alcohol use No     Drug use: No     Sexual activity: Not Asked     Other Topics Concern     None     Social History Narrative    Retired realtor.  Lives with  Richard. 2 children alive and well.         LABORATORY VALUES:   Last Basic Metabolic Panel:  Lab Results   Component Value Date     04/19/2017      Lab Results   Component Value Date    POTASSIUM 3.4 04/19/2017     Lab Results   Component Value Date    CHLORIDE 106 04/19/2017     Lab Results   Component Value Date    ANNABELLE 8.3 04/19/2017     Lab Results   Component Value Date    CO2 26 04/19/2017     Lab Results   Component Value Date    BUN 9 04/19/2017     Lab Results   Component Value Date    CR 0.42 04/19/2017     Lab Results   Component Value Date    GLC 94 04/19/2017       CBC results:  Lab Results   Component Value Date    WBC 12.3 04/19/2017     Lab Results   Component Value Date    HGB 7.1 04/19/2017     Lab Results   Component Value Date    HCT 24.6 04/19/2017     Lab Results   Component Value Date     04/19/2017       DIAGNOSTIC TESTS:       Labs above reviewed as well as additional relevant diagnostic studies from the EPIC record.       PHYSICAL EXAMINATION:  VITAL SIGNS:  B/P: 143/66, T: 98.4, P: Data Unavailable, R: 33    Exam:  Constitutional: healthy, alert and mild distress  Head: Normocephalic. No masses, lesions, tenderness or abnormalities  Neck: Neck supple. No adenopathy. Thyroid symmetric, normal size, spit fistula in place in left neck, right CVC in place.  Left neck incision healing well  Respiratory: negative  Gastrointestinal:Abdominal Exam:  Abdomen is soft, nondistended.  Incisions are c/d/i, feeding tube in  place, +TT light palpation in LUQ and LLQ. +TT deep palpation, +gaurding, -rebound,  No allodynia.  : Deferred      TIME SPENT: 30 minutes including 30 minutes of face-to-face time counseling her  about her pain management treatment options, and coordinating care with the primary team.    Mike Enamorado DO  April 19, 2017, 1:48 PM  Inpatient Pain Management Service

## 2017-04-19 NOTE — PROGRESS NOTES
Thoracic Surgery Progress Note  Minerva Blanco  [id number]    Subjective  Poor pain control o/n.  Overall doing well.    Objective  Temp:  [98  F (36.7  C)-98.5  F (36.9  C)] 98  F (36.7  C)  Heart Rate:  [] 90  Resp:  [9-26] 18  BP: ()/(37-84) 95/55  MAP:  [63 mmHg-73 mmHg] 73 mmHg  Arterial Line BP: ()/(43-46) 113/46  SpO2:  [90 %-100 %] 96 %    Physical Exam:  Gen: Awake, alert, NAD  HEENT: Pharyngostomy flushes easily, takedown site c/di  CVS: RRR  Resp: NLB on RA, CT serosang  Abd: Soft, nd, appropriately TTP incisions c/d/i  Extrem: WWP    /207  Pharyngosomy 150/460  G 0/60    CT 76/0    + 2 L  Up 4kg from dry weight    Cr remains stable, low phos/mag/k  Hgb 7.6 <- 10.1 <- 9.0    Assessment & Plan  72 year old female with history of leak s/p topuet flundoplication, s/p multiple procedures, ultimately esophagectomy, spit fistula 1/2017, now s/p lap restrosternal gastric pull through, resection of left half of manubrium, spit fistula takedown, J tube, and pharnygostomy placement 4/17.     Neuro: Re-ordered PCA, c/w scheduled oxy, tylenol, doxepin, gabapentin, robaxin  Resp: Prn NC, CT to water seal  Cardio: Metoprolol BID  GI/FEN: NPO, no feeds, J to gravity okay to use for meds, pharyngostomy to LIS, flush q4, cares ordered, pull back today  Renal: Only weighs 40kg at baseline 15-20 cc of UOP/hour is normal, her Cr remains stable  Endo: SSI  Heme/ID: Hgb downtrened, of note was 9.0 immediately post-op and is positive 2L yesterday, suspect this is dilutional, recheck at 10AM  PPx: SQH, no full anticoagulation yet (hx afib), PPI  Dispo: 6B     Discussed with Dr. Wise.    Alvin Dee MD  General Surgery PGY-3  [phone]

## 2017-04-19 NOTE — PLAN OF CARE
Problem: Goal Outcome Summary  Goal: Goal Outcome Summary  Outcome: No Change  KAREN: BP soft at beginning of shift. Improved with 500cc LR bolus. Other VSS overnight on RA. CVP 2-6, NSR. Attempted to control pain over the course of the shift, unable to achieve pain goals despite scheduled and PRN meds. Pain in abdomen (cramping), shoulders (right more than left), and throat. Neuro intact with unequal pupils. Lungs coarse on right with chest tube to suction. Air leak continues without recordable amounts of output. J tube to gravity, clamped after meds. Pharyngostomy to LIS with minimal bilious output. Low UO overnight reiterated to MD multiple times. Small response from bolus, tapered back off afterward (MD aware) - continue to monitor. Elytes replaced per protocol. Hemoglobin with drastic drop from previous lab result, MD notified and asked for CT abdomen. MD declined, will share with AM team.  P: Continue to attempt pain control. Notify MD with any acute status changes. Assess need for current and additional therapies / interventions.

## 2017-04-19 NOTE — PROGRESS NOTES
SURGICAL ICU PROGRESS NOTE  April 19, 2017      CO-MORBIDITIES:   Esophageal perforation  A-Fib on warfarin therapy  Fibromyalgia  MS  Hiatal hernia with Toupet fundoplication    History of breast cancer     PRIMARY TEAM: Thoracic   PRIMARY PHYSICIAN: OLIMPIA Wise MD     ASSESSMENT:  Minerva Blanco is a 72 year old female with a history of a fib on coumadin (now held), breast cancer s/p breast conservation therapy, fibromyalgia, MS, hiatal hernia and GERD s/p Toupet fundoplication 1/2016 with a post operative course that was complicated by esophageal perforation with mediastinal phlegmon, and was taken back for proximal gastrectomy, distal esophagectomy, spit fistula creation, and left thoracotomy with mediastinal phlegmon excision on 1/9/2017. Neck Dissection, Spit Fistula Takedown, Laparoscopic Jejunostomy Tube and Pharyngostomy Tube, Gastric Pull up, Upper Endoscopy(EGD) 4/17/17.     Today's Interval Changes:  -d/c all PRN pain meds  -decrease scheduled oxy to 5 mg  -increase scheduled gabapentin to 200 mg  -increased robaxin to QID  -re-start dilaudid PCA  -PICC line placed  -Transfer to 6B; Thoracic Surgery primary     PLAN:  Neuro/ pain/ sedation:  #History of chronic pain   #Acute surgical pain  - Hydromorphone PCA for pain.  - PO acetaminophen 975 q8  - Oxycodone 5mg q4 scheduled   - Gabapentin 200 mg TID  - Robaxin 500mg  QID  - Doxepin 3mg qhs  - Contact pain team with ongoing pain concerns    Pulmonary care:   #Respiratory insufficiency  -Supplemental oxygen to keep saturation above 92 %.  -Capnography while on PCA   -Incentive spirometer every 15- 30 minutes when awake.  -Right chest tube to -10 suction; management per thoracic surgery       Cardiovascular:    #A-fib on warfarin  - A fib on metoprolol 12.5 BID; with hold parameters    - Keep MAPs above 60  -Resume warfarin ASAP; at direction of thoracic surgery     GI/Nutrition:   #Severe malnutrition in context of acute illness  #History of hiatal  hernia with Toupet fundoplication   -NPO except meds x 72 hours  -J to gravity; ok to use/clamp for meds   -Pharyngostomy to LIS  -Nutrition consultation; hold nutrition for 72 hours post-op.  Had previously been on TF prior to admission    Renal/Fluids/ Electrolytes:   #Hypophosphatemia   -Monitor I&Os closely.  -Cr at baseline of 0.42  -ICU electrolyte replacement protocol  -Replace phos per protocol  -mIVF with LR     Endocrine:   -SSI; no active issues      ID/ Antibiotics:  Leukocytosis  -No evidence of active infection. Presume leukocytosis is post-op inflammatory response.  Afebrile.   -Monitor WBC daily  -No indication for antibiotics.      Heme:   #acute blood loss anemia  -Hemoglobin 11.2 on admission; Post-op 9.1>9.5>9.0>10.1>7.6>7.1  -Repeat hemoglobin at 1800.    -Transfuse for hgb <7.0    #h/o Afib on coumadin.  -Coumadin for afib at home. Now held.  -Heparin sc 5000 TID ordered by thoracic surgery    Prophylaxis:    -Mechanical prophylaxis for DVT.   -Heparin  -Protonix     MSK:   -PT and OT consulted. Appreciate recs.     Lines/ tubes/ drains:  - Pharyngostomy, CT, right central line, PIV, port, PICC     Disposition:  -6B      This patient was seen and discussed with Dr. Cynthia Brady, SICU staff, who agrees with the plan above.       Time Spent on this Encounter   I spent 40 minutes managing the critical care of Minerva Blanco in relation to the issues listed in this note.  Sobia Scott, [facility]     ====================================    TODAY'S PROGRESS:   SUBJECTIVE:   Course reviewed. No acute overnight events. Some crampy abdominal pain overnight refractory to Valium. Patient's pain medication regimen was adjusted as noted above and she was encouraged to be OOB and walking to help decrease pain. Chloraseptic spray ordered for through pain.  Orders are in the transfer to 6B.      OBJECTIVE:   1. VITAL SIGNS:   Temp:  [98  F (36.7  C)-98.5  F (36.9  C)] 98  F (36.7  C)  Heart Rate:  []  90  Resp:  [9-26] 14  BP: ()/(37-84) 98/57  MAP:  [73 mmHg] 73 mmHg  Arterial Line BP: (113)/(46) 113/46  SpO2:  [90 %-100 %] 95 %  Resp: 14    2. INTAKE/ OUTPUT:   I/O last 3 completed shifts:  In: 3626.83 [I.V.:2661.83; NG/GT:465; IV Piggyback:500]  Out: 1655 [Urine:969; Emesis/NG output:610; Chest Tube:76]    3. PHYSICAL EXAMINATION:   General: awake, alert, endorses significant pain   HEENT: Neck with left pharyngostomy tube to LIS; sutures intact.   Neuro: alert, orieted, conversant. No focal deficits  Pulm/Resp: Course and wheezing on the right; diminished on left.  CT on right to suction.   CV: RRR, S1, S2, no murmur rubs or gallops   Abdomen: Soft, non-distended, non-tender, no rebound tenderness or guarding, no masses. G-tube clamped; dressing CDI   : ojeda catheter in place, urine yellow and clear  Incisions/Skin: anterior neck incision; sternal incision, CDI. Minimal serosang drainage  Extremities: Moving all extremities, peripheral pulses 2+, calves soft and non-tener, extremities well perfused.     4. INVESTIGATIONS:   Arterial Blood Gases     Recent Labs  Lab 04/17/17  1306 04/17/17  1125   PH 7.36 7.37   PCO2 46* 43   PO2 192* 246*   HCO3 26 25     Complete Blood Count     Recent Labs  Lab 04/19/17  1035 04/19/17  0357 04/18/17  0446 04/17/17  1702  04/14/17  0821   WBC  --  12.3* 5.9 10.5  --  7.9   HGB 7.1* 7.6* 10.1* 9.0*  < > 11.9   PLT  --  273 230 312  --  313   < > = values in this interval not displayed.  Basic Metabolic Panel    Recent Labs  Lab 04/19/17  0357 04/18/17  0446 04/17/17  1702 04/17/17  1306  04/17/17  0653    140 138 136  < > 133   POTASSIUM 3.4 3.9 3.4 2.9*  < > 3.6   CHLORIDE 106 108 107  --   --  97   CO2 26 26 23  --   --  31   BUN 9 11 15  --   --  16   CR 0.42* 0.44* 0.42*  --   --  0.52   GLC 94 94 161* 149*  < > 74   < > = values in this interval not displayed.  Liver Function Tests    Recent Labs  Lab 04/19/17  0417 04/18/17  0446 04/17/17  8761  04/17/17  0653   AST  --   --   --  38   ALT  --   --   --  56*   ALKPHOS  --   --   --  283*   BILITOTAL  --   --   --  0.8   ALBUMIN  --   --   --  3.6   INR 1.92* 1.32* 1.15* 1.04     Pancreatic Enzymes  No lab results found in last 7 days.  Coagulation Profile    Recent Labs  Lab 04/19/17  0417 04/18/17  0446 04/17/17  1702 04/17/17  0653   INR 1.92* 1.32* 1.15* 1.04         5. RADIOLOGY:   Recent Results (from the past 24 hour(s))   XR Chest Port 1 View    Narrative    Exam: XR CHEST PORT 1 VW, 4/19/2017 1:15 AM    Indication: interval change    Comparison: 4/18/2017.    Findings:   AP view. Enteric tube courses down the midline, tip over the upper mid  abdomen. Right IJ central venous catheter tip over the low SVC.  Surgical clips over the right axilla. Stable right chest tube. Cardiac  silhouette is stable. No pneumothorax. Right mid lung peripheral  opacity is unchanged. Small pleural effusions and bibasilar opacities  are likely unchanged given differences in position.      Impression    Impression: Likely stable small pleural effusions and bibasilar  opacities, right mid lung peripheral opacity, given differences in  position. Atelectasis or infection.    I have personally reviewed the examination and initial interpretation  and I agree with the findings.    BUSHRA STEINBERG MD       =========================================

## 2017-04-19 NOTE — PROGRESS NOTES
SURGERY CROSS COVER    Paged 2/2 pt c/o of severe abd pain.  Pain described as crampy and comes in waves.  Denies any nausea or vomiting.  abd ND, appropriately ttp, dressing in place, J tube in place    Plan:  -2.5mg valium x1 for muscle spasms.    Woody Berman MD  General Surgery Resident  [phone](p)

## 2017-04-19 NOTE — PLAN OF CARE
Problem: Goal Outcome Summary  Goal: Goal Outcome Summary  OT 4AB: Evaluation complete and treatment initiated.  Pt limited by pain, deconditioning and multiple lines; however, able to complete bedside transfers and bed mobility CGA-Min A - cues for compliance with post surgical precautions.  Min-mod A for LE dressing.  Receptive to education.  VSS on RA.  Anticipate discharge to home with family assist and home OT/PT.

## 2017-04-19 NOTE — PROGRESS NOTES
SPIRITUAL HEALTH SERVICES  SPIRITUAL ASSESSMENT Progress Note  [facility] ([place] 4A     REFERRAL SOURCE: follow-up    Attempted follow-up visit twice today, was having cares provided on first and was having a sterile procedure at the second occurrence.     PLAN: I will follow up with Minerva as she is more able tomorrow, 1-2x/week as she remains on the unit.     Kaye Jiang  Chaplain Resident  Pager [phone]

## 2017-04-19 NOTE — PLAN OF CARE
Problem: Goal Outcome Summary  Goal: Goal Outcome Summary  PT/4ab: pt w/ good recall of post-surgical sternal precautions, yet needs cues to adhere to precautions w/ mobility. Pt requiring min A for bed mobility; CGA for STS; progressed to ambulating 50 ft w/ WW w/ CGA + Ax1 for line mgmt, cues for improved gait mechanics provided.  Anticipate pt will progress to be able to disch home w/ spouse assisting as needed and continued home PT for further balance, strength, and posture training, as long as she regains her functional IND.

## 2017-04-19 NOTE — PROGRESS NOTES
Inpatient Pain Management Service: Follow Up Note    The inpatient pain service is continuing to follow Minerva Blanco for her post op pain at the request of her hospital team.    ASSESSMENT:   1. Neck Dissection, Spit Fistula Takedown, Laparoscopic Jejunostomy Tube and Pharyngostomy Tube, Gastric Pull up, Upper Endoscopy(EGD) 4/17/17.  2. S/p proximal gastrectomy, distal esophagectomy, spit fistula creation, and left thoracotomy with mediastinal phlegmon excision on 1/9/2017  3. S/p Toupet fundoplication 1/2016 with a post operative course that was complicated by esophageal perforation with mediastinal phlegmon.  4. History of a fib, warfarin now being held.  5. History of breast cancer  6. History of fibromyalgia  7. History of multiple sclerosis  8. History of hiatal hernia  9. History of IBS    -- Outpatient opioid requirements prior to admission: OME = 45-60mg / day  MN  reviewed.  Oxycodone prescribed by LAUREL Telles  --Oxycodone 5-10mg po q6h prn (averages 30-40mg per day)    RECOMMENDATIONS/PLAN:   1. Continue oxycodone 10mg solution per J tube q4h scheduled.  2. Continue oxycodone solution 5mg per J tube q3h prn moderate to severe pain.  3. Continue hydromorphone 0.2-0.4mg IV q2h prn severe pain.  4. Increase lidoderm 5% patches to 1-3 patches topically q24h, 12 hr on, 12 hr off.  5. Start hyoscyamine solution 0.125mg per J tube q4h prn abdominal cramping.  6. Start simethicone solution 80mg per J tube q6h prn.  7. Continue acetaminophen solution 975mg per J tube q8h.  8. Continue gabapentin solution 100mg per J tube q8h.  9. Continue methocarbamol 500mg per J tube tid.  10. Continue chloraseptic spray q1h prn sore throat.  11. Bowel regimen per primary team to prevent opioid induced constipation.      Recommendations were discussed with Dr. Miguel Garcia (Surgery)  Plan was reviewed by the Pain Service consisting of Dr. Mike Ghotra.    Thank you for consulting the Inpatient Pain  Management Service.   The above recommendations are to be acted upon at the primary team s discretion.     To reach us:  Mon - Friday 8 AM - 3 PM: Pager [phone]   After hours, weekends and holidays: Primary service should call [phone] for the on-call pain specialist    CHIEF PAIN COMPLAINT: Neck pain, abdominal pain    CAPA (Clinically Aligned Pain Assessment):    Comfort (How is your pain?): Tolerable with discomfort  Change in Pain (Since your last medication/intervention?): About the same  Pain Control (How are your pain treatments working?):  Partially effective control  Functioning (Are you able to do activities to get better?) : Pain keeps me from doing most of what I need to do  Sleep (Does your pain management allow you to sleep or rest?): Awake with occasional pain         INTERVAL HISTORY: Patient lying in best, resting, easily arousable.  A/O x 4.  Patient reports being up in the chair for 2 hours this morning, plan is to walk this afternoon.  Feels pain is adequately controlled on scheduled and prn oxycodone solution.  Pain is located in the neck at the surgical site and the abdomen.  Received IV diazepam 2.5mg last evening for abdominal pain and dropped her bp.  Able to get some sleep during the night, waiting for a bed to be able to transfer out of the ICU.  ADDENDUM:  Thoracic team decided to restart hydromorphone pca during their rounds.    FUNCTIONAL STATUS:  Change:      Improving  Oral intake:     NPO, has J tube  Bowel function:    Normal  Activity level:     Up in chair  Sleep:      Awake with occasional pain  Mood:      Tired, poor energy    Medications related to Pain Management:   Opioid Analgesics  --Hydromorphone 0.2-0.4mg IV q2h prn (used 2.6mg [id number])  --Oxycodone solution 10mg per J tube q4h scheduled  --oxycodone solution 5mg per J tube q3h prn (used 6 x 5mg 7a-7a)    Adjuvants/CoAnalgesics  --Acetaminophen solution 975mg per J tube q8h  --Gabapentin solution 100mg per J  tube q8h  --Lidoderm 5% patch 1-2 patches topically q24h, 12 hr on, 12 hr off  --Methocarbamol 500mg per J tube tid    Antidepressants/Anxiolytics  --none    Laxatives/Stool Softeners  --Docusate solution 100mg per J tube qhs  --Senna solution 10ml per J tube bid (refused pm dose)    Other  --Chloraseptic spray q1h prn  --Doxepin solution 3mg po per J tube qhs for sleep      CURRENT MEDICATIONS:   Current Facility-Administered Medications Ordered in Epic   Medication Dose Route Frequency Provider Last Rate Last Dose     naloxone (NARCAN) injection 0.1-0.4 mg  0.1-0.4 mg Intravenous Q2 Min PRN Alvin Dee MD         ondansetron (ZOFRAN-ODT) ODT tab 4 mg  4 mg Oral Q6H PRN Alvin Dee MD        Or     ondansetron (ZOFRAN) injection 4 mg  4 mg Intravenous Q6H PRN Alvin Dee MD         HYDROmorphone (DILAUDID) Loading Dose administered from PCA 0.2-0.3 mg  0.2-0.3 mg Intravenous PCA LOADING DOSE Alvin Dee MD         HYDROmorphone (DILAUDID) PCA 1 mg/mL   Intravenous PCA Alvin Dee MD         lidocaine 1 % 0.5-5 mL  0.5-5 mL Other Once PRN Alvin Dee MD         lidocaine (LMX4) kit   Topical Once PRN Alvin Dee MD         sodium chloride (PF) 0.9% PF flush 5-50 mL  5-50 mL Intracatheter Once PRN Alvin Dee MD         heparin lock flush 10 UNIT/ML injection 2-5 mL  2-5 mL Intracatheter Once PRN Alvin Dee MD         lidocaine 1 % 1 mL  1 mL Other Q1H PRN Alvin Dee MD         lidocaine (LMX4) kit   Topical Q1H PRN Alvin Dee MD         sodium chloride (PF) 0.9% PF flush 3 mL  3 mL Intracatheter Q1H PRN Alvin Dee MD         sodium chloride (PF) 0.9% PF flush 3 mL  3 mL Intracatheter Q8H Alvin Dee MD   3 mL at 04/19/17 0811     lidocaine (LIDODERM) 5 % Patch 1-2 patch  1-2 patch Transdermal Q24h Alvin Dee MD   2 patch at 04/19/17 0914    And     lidocaine (LIDODERM) patch REMOVAL   Transdermal Q24H Avlin Dee MD        And     lidocaine (LIDODERM)  Patch in Place   Transdermal Q8H Alvin Dee MD         potassium chloride 10 mEq in 100 mL intermittent infusion  10 mEq Intravenous Q1H PRN Alvin Dee MD         potassium chloride 10 mEq in 100 mL intermittent infusion with 10 mg lidocaine  10 mEq Intravenous Q1H PRAlvin Mcallister MD         potassium chloride 20 mEq in 50 mL intermittent infusion  20 mEq Intravenous Q1H PRN Alvin Dee MD 25 mL/hr at 04/19/17 0507 20 mEq at 04/19/17 0507     potassium phosphate 10 mmol in D5W 250 mL intermittent infusion  10 mmol Intravenous Daily PRN Alvin Dee MD         potassium phosphate 15 mmol in D5W 250 mL intermittent infusion  15 mmol Intravenous Daily PRN Alvin Dee MD         potassium phosphate 20 mmol in D5W 500 mL intermittent infusion  20 mmol Intravenous Q6H PRAlvin Mcallister MD   20 mmol at 04/19/17 0913     potassium phosphate 20 mmol in D5W 250 mL intermittent infusion  20 mmol Intravenous Q6H PRN Alvin Dee MD         potassium phosphate 25 mmol in D5W 500 mL intermittent infusion  25 mmol Intravenous Q8H PRN Alvin Dee MD         chlorhexidine (HIBICLENS) 4 % liquid   Topical BID Alvin Dee MD         chlorhexidine (PERIDEX) 0.12 % solution 15 mL  15 mL Swish & Spit BID Alvin Dee MD   15 mL at 04/19/17 0810     metoprolol (LOPRESSOR) suspension 12.5 mg  12.5 mg Per J Tube BID Justice Mohan MD   12.5 mg at 04/19/17 0813     heparin sodium PF injection 5,000 Units  5,000 Units Subcutaneous Q8H Alvin Dee MD   5,000 Units at 04/19/17 0346     magnesium sulfate 4 g in 100 mL sterile water (premade)  4 g Intravenous Q4H PRN Sobia Scott APRN CNP         methocarbamol (ROBAXIN) tablet 500 mg  500 mg Oral TID Giorgi Husain NP   500 mg at 04/19/17 0807     doxepin (SINEquan) 10 MG/ML (HIGH CONC) solution 3 mg  3 mg Oral At Bedtime Giorgi Husain NP   3 mg at 04/18/17 2128     magnesium sulfate 2 g in NS intermittent infusion  (PharMEDium or FV Cmpd)  2 g Intravenous Daily PRN DewitSobia APRN CNP   2 g at 04/19/17 0914     pantoprazole (PROTONIX) suspension 40 mg  40 mg Oral or Feeding Tube Daily Andrea Knapp MD   40 mg at 04/19/17 0811     levalbuterol (XOPENEX) neb solution 0.63 mg  0.63 mg Nebulization Q4H WA Saul Rogers PA-C   0.63 mg at 04/19/17 0824     sodium chloride 3 % neb solution 3 mL  3 mL Nebulization Q4H While awake Saul Rogers PA-C   3 mL at 04/19/17 0824     oxyCODONE (ROXICODONE) solution 5 mg  5 mg Per J Tube Q3H PRN Giorgi Husain NP   5 mg at 04/19/17 0807     oxyCODONE (ROXICODONE) solution 10 mg  10 mg Per J Tube Q4H Giorgi Husain NP   10 mg at 04/19/17 0619     acetaminophen (TYLENOL) solution 975 mg  975 mg Oral Q8H Giorgi Husain NP   975 mg at 04/19/17 0806     gabapentin (NEURONTIN) solution 100 mg  100 mg Oral Q8H ANDREI Giorgi Husain NP   100 mg at 04/19/17 0619     sennosides (SENOKOT) syrup 10 mL  10 mL Per J Tube BID Alvin Dee MD   10 mL at 04/19/17 0806     docusate (COLACE) 50 MG/5ML liquid 100 mg  100 mg Per J Tube At Bedtime Alvin Dee MD         HYDROmorphone (DILAUDID) injection 0.2-0.4 mg  0.2-0.4 mg Intravenous Q2H PRN Sobia Scott APRN CNP   0.4 mg at 04/19/17 0417     phenol (CHLORASEPTIC) 1.4 % spray 1 mL  1 spray Mouth/Throat Q1H PRN Sobia Scott APRN CNP   1 mL at 04/19/17 0813     lactated ringers infusion   Intravenous Continuous Alvin Dee MD 50 mL/hr at 04/19/17 0900       glucose 40 % gel 15-30 g  15-30 g Oral Q15 Min PRN Arslan Wisdom MD        Or     dextrose 50 % injection 25-50 mL  25-50 mL Intravenous Q15 Min PRN Arslan Wisdom MD        Or     glucagon injection 1 mg  1 mg Subcutaneous Q15 Min PRN Arslan Wisdom MD         naloxone (NARCAN) injection 0.1-0.4 mg  0.1-0.4 mg Intravenous Q2 Min PRN Arslan Wisdom MD         insulin aspart (NovoLOG) inj (RAPID ACTING)  1-6 Units  Subcutaneous Q4H Arslan Wisdom MD         ondansetron (ZOFRAN-ODT) ODT tab 4 mg  4 mg Oral Q6H PRN Arslan Wisdom MD        Or     ondansetron (ZOFRAN) injection 4 mg  4 mg Intravenous Q6H PRN Arslan Wisdom MD         prochlorperazine (COMPAZINE) injection 5 mg  5 mg Intravenous Q6H PRN Arslan Wisdom MD        Or     prochlorperazine (COMPAZINE) tablet 5 mg  5 mg Oral Q6H PRN Arslan Wisdom MD        Or     prochlorperazine (COMPAZINE) Suppository 12.5 mg  12.5 mg Rectal Q12H PRN Arslan Wisdom MD         bupivacaine 0.25 % - EPINEPHrine 1:200,000 injection    PRN Steven Perez MD   20 mL at 01/21/17 1255     No current Epic-ordered outpatient prescriptions on file.         PHYSICAL EXAM:   Vitals:   Temp:  [98  F (36.7  C)-98.5  F (36.9  C)] 98  F (36.7  C)  Heart Rate:  [] 90  Resp:  [9-26] 18  BP: ()/(37-84) 95/55  MAP:  [63 mmHg-73 mmHg] 73 mmHg  Arterial Line BP: ()/(43-46) 113/46  SpO2:  [90 %-100 %] 96 %  Patient Vitals for the past 8 hrs:   BP Temp Temp src Heart Rate Resp SpO2 Weight   04/19/17 0900 95/55 - - 90 18 96 % -   04/19/17 0810 129/79 - - 105 13 97 % -   04/19/17 0800 - 98  F (36.7  C) Oral - - - -   04/19/17 0600 117/60 - - 90 16 92 % -   04/19/17 0555 - - - 90 17 93 % -   04/19/17 0500 124/60 - - 96 20 92 % -   04/19/17 0430 125/56 - - 87 18 93 % -   04/19/17 0400 114/47 98.2  F (36.8  C) Oral 80 20 94 % 44.5 kg (98 lb 1.7 oz)   04/19/17 0300 94/50 - - 85 15 94 % -         LAB DATA:  Results for orders placed or performed during the hospital encounter of 04/17/17 (from the past 24 hour(s))   Glucose by meter   Result Value Ref Range    Glucose 83 70 - 99 mg/dL   Glucose by meter   Result Value Ref Range    Glucose 104 (H) 70 - 99 mg/dL   Glucose by meter   Result Value Ref Range    Glucose 80 70 - 99 mg/dL   Glucose by meter   Result Value Ref Range    Glucose 82 70 - 99 mg/dL   XR Chest Port 1 View    Narrative    Exam: XR CHEST PORT 1 VW, 4/19/2017 1:15  AM    Indication: interval change    Comparison: 4/18/2017.    Findings:   AP view. Enteric tube courses down the midline, tip over the upper mid  abdomen. Right IJ central venous catheter tip over the low SVC.  Surgical clips over the right axilla. Stable right chest tube. Cardiac  silhouette is stable. No pneumothorax. Right mid lung peripheral  opacity is unchanged. Small pleural effusions and bibasilar opacities  are likely unchanged given differences in position.      Impression    Impression: Likely stable small pleural effusions and bibasilar  opacities, right mid lung peripheral opacity, given differences in  position. Atelectasis or infection.    I have personally reviewed the examination and initial interpretation  and I agree with the findings.    BUSHRA STEINBERG MD   CBC with platelets   Result Value Ref Range    WBC 12.3 (H) 4.0 - 11.0 10e9/L    RBC Count 2.87 (L) 3.8 - 5.2 10e12/L    Hemoglobin 7.6 (L) 11.7 - 15.7 g/dL    Hematocrit 24.6 (L) 35.0 - 47.0 %    MCV 86 78 - 100 fl    MCH 26.5 26.5 - 33.0 pg    MCHC 30.9 (L) 31.5 - 36.5 g/dL    RDW 17.0 (H) 10.0 - 15.0 %    Platelet Count 273 150 - 450 10e9/L   Basic metabolic panel   Result Value Ref Range    Sodium 140 133 - 144 mmol/L    Potassium 3.4 3.4 - 5.3 mmol/L    Chloride 106 94 - 109 mmol/L    Carbon Dioxide 26 20 - 32 mmol/L    Anion Gap 8 3 - 14 mmol/L    Glucose 94 70 - 99 mg/dL    Urea Nitrogen 9 7 - 30 mg/dL    Creatinine 0.42 (L) 0.52 - 1.04 mg/dL    GFR Estimate >90  Non  GFR Calc   >60 mL/min/1.7m2    GFR Estimate If Black >90   GFR Calc   >60 mL/min/1.7m2    Calcium 8.3 (L) 8.5 - 10.1 mg/dL   Magnesium   Result Value Ref Range    Magnesium 1.8 1.6 - 2.3 mg/dL   Phosphorus   Result Value Ref Range    Phosphorus 1.6 (L) 2.5 - 4.5 mg/dL   Glucose by meter   Result Value Ref Range    Glucose 94 70 - 99 mg/dL   INR   Result Value Ref Range    INR 1.92 (H) 0.86 - 1.14   Glucose by meter   Result Value Ref  Range    Glucose 131 (H) 70 - 99 mg/dL       Mandy GutierrezD, MS  April 19, 2017 10:19 AM

## 2017-04-19 NOTE — PROGRESS NOTES
First attempt at placing PICC line in right medial brachial, access obtained, wire threaded without difficulty, unable to thread catheter. Right arm picc aborted.  Second picc attempt in left lateral brachial, wire and catheter threaded without difficulty.   First xray, tip in RA, pulled back 3 cm. Tip now in low SVC, OK to use

## 2017-04-20 ENCOUNTER — APPOINTMENT (OUTPATIENT)
Dept: PHYSICAL THERAPY | Facility: CLINIC | Age: 72
DRG: 326 | End: 2017-04-20
Attending: THORACIC SURGERY (CARDIOTHORACIC VASCULAR SURGERY)
Payer: MEDICARE

## 2017-04-20 ENCOUNTER — APPOINTMENT (OUTPATIENT)
Dept: GENERAL RADIOLOGY | Facility: CLINIC | Age: 72
DRG: 326 | End: 2017-04-20
Attending: THORACIC SURGERY (CARDIOTHORACIC VASCULAR SURGERY)
Payer: MEDICARE

## 2017-04-20 ENCOUNTER — APPOINTMENT (OUTPATIENT)
Dept: OCCUPATIONAL THERAPY | Facility: CLINIC | Age: 72
DRG: 326 | End: 2017-04-20
Attending: THORACIC SURGERY (CARDIOTHORACIC VASCULAR SURGERY)
Payer: MEDICARE

## 2017-04-20 LAB
ANION GAP SERPL CALCULATED.3IONS-SCNC: 9 MMOL/L (ref 3–14)
BUN SERPL-MCNC: 8 MG/DL (ref 7–30)
CALCIUM SERPL-MCNC: 8 MG/DL (ref 8.5–10.1)
CHLORIDE SERPL-SCNC: 104 MMOL/L (ref 94–109)
CO2 SERPL-SCNC: 26 MMOL/L (ref 20–32)
CREAT SERPL-MCNC: 0.42 MG/DL (ref 0.52–1.04)
ERYTHROCYTE [DISTWIDTH] IN BLOOD BY AUTOMATED COUNT: 17.1 % (ref 10–15)
GFR SERPL CREATININE-BSD FRML MDRD: ABNORMAL ML/MIN/1.7M2
GLUCOSE BLDC GLUCOMTR-MCNC: 121 MG/DL (ref 70–99)
GLUCOSE BLDC GLUCOMTR-MCNC: 63 MG/DL (ref 70–99)
GLUCOSE BLDC GLUCOMTR-MCNC: 73 MG/DL (ref 70–99)
GLUCOSE BLDC GLUCOMTR-MCNC: 81 MG/DL (ref 70–99)
GLUCOSE BLDC GLUCOMTR-MCNC: 87 MG/DL (ref 70–99)
GLUCOSE BLDC GLUCOMTR-MCNC: 88 MG/DL (ref 70–99)
GLUCOSE BLDC GLUCOMTR-MCNC: 89 MG/DL (ref 70–99)
GLUCOSE BLDC GLUCOMTR-MCNC: 90 MG/DL (ref 70–99)
GLUCOSE SERPL-MCNC: 86 MG/DL (ref 70–99)
HCT VFR BLD AUTO: 23.1 % (ref 35–47)
HGB BLD-MCNC: 7.3 G/DL (ref 11.7–15.7)
MAGNESIUM SERPL-MCNC: 1.8 MG/DL (ref 1.6–2.3)
MCH RBC QN AUTO: 26.5 PG (ref 26.5–33)
MCHC RBC AUTO-ENTMCNC: 31.6 G/DL (ref 31.5–36.5)
MCV RBC AUTO: 84 FL (ref 78–100)
PHOSPHATE SERPL-MCNC: 2.6 MG/DL (ref 2.5–4.5)
PLATELET # BLD AUTO: 285 10E9/L (ref 150–450)
POTASSIUM SERPL-SCNC: 3.6 MMOL/L (ref 3.4–5.3)
RBC # BLD AUTO: 2.75 10E12/L (ref 3.8–5.2)
SODIUM SERPL-SCNC: 139 MMOL/L (ref 133–144)
WBC # BLD AUTO: 9.5 10E9/L (ref 4–11)

## 2017-04-20 PROCEDURE — 83735 ASSAY OF MAGNESIUM: CPT | Performed by: SURGERY

## 2017-04-20 PROCEDURE — A9270 NON-COVERED ITEM OR SERVICE: HCPCS | Mod: GY | Performed by: SURGERY

## 2017-04-20 PROCEDURE — 80048 BASIC METABOLIC PNL TOTAL CA: CPT | Performed by: SURGERY

## 2017-04-20 PROCEDURE — 25000125 ZZHC RX 250: Performed by: SURGERY

## 2017-04-20 PROCEDURE — 84100 ASSAY OF PHOSPHORUS: CPT | Performed by: SURGERY

## 2017-04-20 PROCEDURE — 40000133 ZZH STATISTIC OT WARD VISIT: Performed by: OCCUPATIONAL THERAPIST

## 2017-04-20 PROCEDURE — 25000125 ZZHC RX 250: Performed by: PHYSICIAN ASSISTANT

## 2017-04-20 PROCEDURE — 71020 XR CHEST 2 VW: CPT

## 2017-04-20 PROCEDURE — 25000132 ZZH RX MED GY IP 250 OP 250 PS 637: Mod: GY | Performed by: SURGERY

## 2017-04-20 PROCEDURE — 25000128 H RX IP 250 OP 636: Performed by: SURGERY

## 2017-04-20 PROCEDURE — 97116 GAIT TRAINING THERAPY: CPT | Mod: GP

## 2017-04-20 PROCEDURE — 12000006 ZZH R&B IMCU INTERMEDIATE UMMC

## 2017-04-20 PROCEDURE — 25800025 ZZH RX 258: Performed by: STUDENT IN AN ORGANIZED HEALTH CARE EDUCATION/TRAINING PROGRAM

## 2017-04-20 PROCEDURE — 25000132 ZZH RX MED GY IP 250 OP 250 PS 637: Mod: GY | Performed by: NURSE PRACTITIONER

## 2017-04-20 PROCEDURE — A9270 NON-COVERED ITEM OR SERVICE: HCPCS | Mod: GY | Performed by: PHYSICIAN ASSISTANT

## 2017-04-20 PROCEDURE — 40000275 ZZH STATISTIC RCP TIME EA 10 MIN

## 2017-04-20 PROCEDURE — 36415 COLL VENOUS BLD VENIPUNCTURE: CPT | Performed by: SURGERY

## 2017-04-20 PROCEDURE — 97535 SELF CARE MNGMENT TRAINING: CPT | Mod: GO | Performed by: OCCUPATIONAL THERAPIST

## 2017-04-20 PROCEDURE — 97110 THERAPEUTIC EXERCISES: CPT | Mod: GO | Performed by: OCCUPATIONAL THERAPIST

## 2017-04-20 PROCEDURE — 71010 XR CHEST PORT 1 VW: CPT

## 2017-04-20 PROCEDURE — 27210437 ZZH NUTRITION PRODUCT SEMIELEM INTERMED LITER

## 2017-04-20 PROCEDURE — 25000132 ZZH RX MED GY IP 250 OP 250 PS 637: Mod: GY | Performed by: PHYSICIAN ASSISTANT

## 2017-04-20 PROCEDURE — 25000125 ZZHC RX 250: Performed by: NURSE PRACTITIONER

## 2017-04-20 PROCEDURE — 25000132 ZZH RX MED GY IP 250 OP 250 PS 637: Mod: GY | Performed by: STUDENT IN AN ORGANIZED HEALTH CARE EDUCATION/TRAINING PROGRAM

## 2017-04-20 PROCEDURE — 40000193 ZZH STATISTIC PT WARD VISIT

## 2017-04-20 PROCEDURE — 85027 COMPLETE CBC AUTOMATED: CPT | Performed by: SURGERY

## 2017-04-20 PROCEDURE — A9270 NON-COVERED ITEM OR SERVICE: HCPCS | Mod: GY | Performed by: STUDENT IN AN ORGANIZED HEALTH CARE EDUCATION/TRAINING PROGRAM

## 2017-04-20 PROCEDURE — 99207 ZZC NO CHARGE SIGN-OFF PS: CPT

## 2017-04-20 PROCEDURE — 74000 XR ABDOMEN PORT F1 VW: CPT

## 2017-04-20 PROCEDURE — 97530 THERAPEUTIC ACTIVITIES: CPT | Mod: GP

## 2017-04-20 PROCEDURE — 00000146 ZZHCL STATISTIC GLUCOSE BY METER IP

## 2017-04-20 PROCEDURE — A9270 NON-COVERED ITEM OR SERVICE: HCPCS | Mod: GY | Performed by: NURSE PRACTITIONER

## 2017-04-20 RX ORDER — OXYCODONE HCL 5 MG/5 ML
5 SOLUTION, ORAL ORAL
Status: DISCONTINUED | OUTPATIENT
Start: 2017-04-20 | End: 2017-04-26

## 2017-04-20 RX ORDER — OXYCODONE HCL 5 MG/5 ML
10 SOLUTION, ORAL ORAL EVERY 4 HOURS
Status: DISCONTINUED | OUTPATIENT
Start: 2017-04-20 | End: 2017-04-28

## 2017-04-20 RX ADMIN — DOXEPIN HYDROCHLORIDE 3 MG: 10 SOLUTION ORAL at 20:18

## 2017-04-20 RX ADMIN — METHOCARBAMOL 500 MG: 500 TABLET ORAL at 05:34

## 2017-04-20 RX ADMIN — PANTOPRAZOLE SODIUM 40 MG: 40 TABLET, DELAYED RELEASE ORAL at 07:45

## 2017-04-20 RX ADMIN — OXYCODONE HYDROCHLORIDE 5 MG: 5 SOLUTION ORAL at 22:06

## 2017-04-20 RX ADMIN — OXYCODONE HYDROCHLORIDE 10 MG: 5 SOLUTION ORAL at 20:16

## 2017-04-20 RX ADMIN — SENNOSIDES A AND B 5 ML: 415.36 LIQUID ORAL at 20:16

## 2017-04-20 RX ADMIN — POTASSIUM CHLORIDE 20 MEQ: 29.8 INJECTION, SOLUTION INTRAVENOUS at 06:31

## 2017-04-20 RX ADMIN — LEVALBUTEROL HYDROCHLORIDE 0.63 MG: 0.63 SOLUTION RESPIRATORY (INHALATION) at 09:14

## 2017-04-20 RX ADMIN — METHOCARBAMOL 500 MG: 500 TABLET ORAL at 11:46

## 2017-04-20 RX ADMIN — SODIUM CHLORIDE SOLN NEBU 3% 3 ML: 3 NEBU SOLN at 09:14

## 2017-04-20 RX ADMIN — ACETAMINOPHEN 975 MG: 325 SOLUTION ORAL at 08:05

## 2017-04-20 RX ADMIN — LIDOCAINE 2 PATCH: 50 PATCH TOPICAL at 09:08

## 2017-04-20 RX ADMIN — GABAPENTIN 200 MG: 250 SUSPENSION ORAL at 20:17

## 2017-04-20 RX ADMIN — OXYCODONE HYDROCHLORIDE 5 MG: 5 SOLUTION ORAL at 09:53

## 2017-04-20 RX ADMIN — POTASSIUM PHOSPHATE, MONOBASIC AND POTASSIUM PHOSPHATE, DIBASIC 10 MMOL: 224; 236 INJECTION, SOLUTION INTRAVENOUS at 08:06

## 2017-04-20 RX ADMIN — OXYCODONE HYDROCHLORIDE 10 MG: 5 SOLUTION ORAL at 08:05

## 2017-04-20 RX ADMIN — HYDROMORPHONE HYDROCHLORIDE: 10 INJECTION, SOLUTION INTRAMUSCULAR; INTRAVENOUS; SUBCUTANEOUS at 22:11

## 2017-04-20 RX ADMIN — GABAPENTIN 200 MG: 250 SUSPENSION ORAL at 05:36

## 2017-04-20 RX ADMIN — GABAPENTIN 200 MG: 250 SUSPENSION ORAL at 14:12

## 2017-04-20 RX ADMIN — SENNOSIDES A AND B 10 ML: 415.36 LIQUID ORAL at 07:45

## 2017-04-20 RX ADMIN — DEXTROSE MONOHYDRATE 25 ML: 25 INJECTION, SOLUTION INTRAVENOUS at 14:18

## 2017-04-20 RX ADMIN — ANTISEPTIC SURGICAL SCRUB: 0.04 SOLUTION TOPICAL at 20:32

## 2017-04-20 RX ADMIN — DOCUSATE SODIUM 100 MG: 50 LIQUID ORAL at 20:17

## 2017-04-20 RX ADMIN — OXYCODONE HYDROCHLORIDE 10 MG: 5 SOLUTION ORAL at 15:51

## 2017-04-20 RX ADMIN — MULTIVITAMIN 15 ML: LIQUID ORAL at 14:12

## 2017-04-20 RX ADMIN — OXYCODONE HYDROCHLORIDE 5 MG: 5 SOLUTION ORAL at 04:14

## 2017-04-20 RX ADMIN — METHOCARBAMOL 500 MG: 500 TABLET ORAL at 00:06

## 2017-04-20 RX ADMIN — LEVALBUTEROL HYDROCHLORIDE 0.63 MG: 0.63 SOLUTION RESPIRATORY (INHALATION) at 16:51

## 2017-04-20 RX ADMIN — METOPROLOL TARTRATE 12.5 MG: 100 TABLET ORAL at 07:45

## 2017-04-20 RX ADMIN — OXYCODONE HYDROCHLORIDE 10 MG: 5 SOLUTION ORAL at 11:45

## 2017-04-20 RX ADMIN — ANTISEPTIC SURGICAL SCRUB: 0.04 SOLUTION TOPICAL at 09:10

## 2017-04-20 RX ADMIN — HEPARIN SODIUM 5000 UNITS: 5000 INJECTION, SOLUTION INTRAVENOUS; SUBCUTANEOUS at 11:46

## 2017-04-20 RX ADMIN — OXYCODONE HYDROCHLORIDE 5 MG: 5 SOLUTION ORAL at 00:06

## 2017-04-20 RX ADMIN — Medication 2 G: at 05:34

## 2017-04-20 RX ADMIN — ACETAMINOPHEN 975 MG: 325 SOLUTION ORAL at 15:51

## 2017-04-20 RX ADMIN — METHOCARBAMOL 500 MG: 500 TABLET ORAL at 18:51

## 2017-04-20 RX ADMIN — SODIUM CHLORIDE SOLN NEBU 3% 3 ML: 3 NEBU SOLN at 16:52

## 2017-04-20 RX ADMIN — CHLORHEXIDINE GLUCONATE 15 ML: 1.2 RINSE ORAL at 20:19

## 2017-04-20 RX ADMIN — HEPARIN SODIUM 5000 UNITS: 5000 INJECTION, SOLUTION INTRAVENOUS; SUBCUTANEOUS at 20:16

## 2017-04-20 RX ADMIN — ACETAMINOPHEN 975 MG: 325 SOLUTION ORAL at 00:05

## 2017-04-20 RX ADMIN — HEPARIN SODIUM 5000 UNITS: 5000 INJECTION, SOLUTION INTRAVENOUS; SUBCUTANEOUS at 04:14

## 2017-04-20 RX ADMIN — CHLORHEXIDINE GLUCONATE 15 ML: 1.2 RINSE ORAL at 08:06

## 2017-04-20 ASSESSMENT — PAIN DESCRIPTION - DESCRIPTORS: DESCRIPTORS: CONSTANT

## 2017-04-20 NOTE — PLAN OF CARE
Problem: Goal Outcome Summary  Goal: Goal Outcome Summary  PT 4A: Pt tolerated session well. Amb 150' and performed multiple transfers with FWW. Limited by deconditioning and post op precautions. Recommend pt discharge home with assist and home PT.

## 2017-04-20 NOTE — PLAN OF CARE
Problem: Goal Outcome Summary  Goal: Goal Outcome Summary  OT 4AB:  Pt. Min/CGA with BADLs and functional transf.s.  VSS throughout session on RA.   Anticipate d/c home with A.

## 2017-04-20 NOTE — PROGRESS NOTES
"Nutrition Progress Note - Consulted now for TF (assess/order) with noted comments from Thoracic svc to begin \"trickle\" feeds only today (4/20); f/u for progress towards previous nutrition POC (see previous 4/18 assessment for details)    -Enteral access: Jtube  -EN: none yet  -FEN: CRP 85 (1/23/17) but none rechecked yet this adm; K+ 3.6/Mg++ 1.8/Phos 2.6 mg/dL (today) - all WNL for start of TF    Interventions:  Collaboration and Referral of Nutrition care - Discussed plan for FEN/GI on rounds with Providers who approved trophic TF (@ 10 ml/hr) and implement previous RD recs (see 4/18 note for details).    Hermila Martin,RD, LD, CNSC (pgr [phone])   "

## 2017-04-20 NOTE — PHARMACY-CONSULT NOTE
Pharmacy Tube Feeding Consult    Medication reviewed for administration by feeding tube and for potential food/drug interactions.  Recommendation: No changes are needed at this time.   Pharmacy will continue to follow as new medications are ordered.

## 2017-04-20 NOTE — PROGRESS NOTES
Thoracic Surgery Progress Note  Minerva Blanco  [id number]    Subjective  Emesis this AM, pharyngostomy with 200cc output after.  + BM yesterday.  No further nausea.    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      Neuro: PCA, c/w scheduled oxy, tylenol, doxepin, gabapentin, robaxin  Resp: Prn NC, CT to water seal  Cardio: Metoprolol BID  GI/FEN: NPO, pharyngostomy to LIS, flush q4, cares ordered, pull back today, appears in good position on both PA and lateral XR today, ensure patency, trophic tube feeds  Renal: Only weighs 40kg at baseline 15-20 cc of UOP/hour is normal, her Cr remains stable  Endo: SSI  Heme/ID: Hgb stable   PPx: SQH, no full anticoagulation yet (hx afib), PPI  Dispo: 6B     Alvin Dee MD  General Surgery PGY-3  [phone]

## 2017-04-20 NOTE — PLAN OF CARE
Problem: Pain, Acute (Adult)  Goal: Identify Related Risk Factors and Signs and Symptoms  Related risk factors and signs and symptoms are identified upon initiation of Human Response Clinical Practice Guideline (CPG)  Outcome: Improving  Pt alert and oriented. Dilaudid PCA continued at 0.3mg/hr, with scheduled and prn oxycodone given. Pt states pain is improving. Sinus tachycardia, no ectopy noted. Bp stable. Afebrile. RA most of the day, required 1-2L NC while sleeping. LS clear bilaterally, LLL coarse/expiratory wheeze. Pt using IS with encouragement, reaches 600. No bm today, BS active. Tf started at 10cc/hr. Whitt removed this am, pt up to commode to void, 175cc out this afternoon. Pharyngostomy tube to LIS, moderate output- see flow sheets for details. Pt has intermitten hypoglycemia, dropped to 63- treated per protocol. No acute skin change, pt up in chair most of day, ambulated in hallway with PT. Electrolytes replaced per protocol, notify MD of any changes.

## 2017-04-20 NOTE — PLAN OF CARE
Problem: Goal Outcome Summary  Goal: Goal Outcome Summary  Outcome: No Change  D/I/A: Patient admitted on 4/17/16 for Neck Dissection, Spit Fistula Takedown, Laparoscopic Jejunostomy Tube and Pharyngostomy Tube, Gastric Pull up, Upper Endoscopy (EGD).   Neuro- Patient alert and oriented X 3. Pupils round and reactive to light. R pupil > L pupil at baseline. Follows commands. Generalized weakness.   CV- Sinus tach without ectopy. Afebrile. MAP > 65 without intervention.   Resp- Patient on RA. Lung sounds clear, diminished in bases. Slight desaturation after emesis, started on 1L O2 via NC at 04:30.   GI- NPO. Large emesis at 04:30. Pharyngostomy tube flushed per orders, patent. Had 200 mL output afterward. MD notified at 05:00, VO to continue to monitor. CXR scheduled for this morning.    - Whitt UO 15-35/hr. LR at 50 mL/hr.   P: Continue to monitor and notify MD of any changes. Possible transfer to  today.

## 2017-04-21 ENCOUNTER — APPOINTMENT (OUTPATIENT)
Dept: GENERAL RADIOLOGY | Facility: CLINIC | Age: 72
DRG: 326 | End: 2017-04-21
Attending: THORACIC SURGERY (CARDIOTHORACIC VASCULAR SURGERY)
Payer: MEDICARE

## 2017-04-21 ENCOUNTER — ANESTHESIA (OUTPATIENT)
Dept: SURGERY | Facility: CLINIC | Age: 72
DRG: 326 | End: 2017-04-21
Payer: MEDICARE

## 2017-04-21 ENCOUNTER — ANESTHESIA EVENT (OUTPATIENT)
Dept: SURGERY | Facility: CLINIC | Age: 72
DRG: 326 | End: 2017-04-21
Payer: MEDICARE

## 2017-04-21 LAB
ABO + RH BLD: NORMAL
ABO + RH BLD: NORMAL
ANION GAP SERPL CALCULATED.3IONS-SCNC: 6 MMOL/L (ref 3–14)
ANION GAP SERPL CALCULATED.3IONS-SCNC: 9 MMOL/L (ref 3–14)
BLD GP AB SCN SERPL QL: NORMAL
BLD PROD TYP BPU: NORMAL
BLD UNIT ID BPU: 0
BLD UNIT ID BPU: 0
BLOOD BANK CMNT PATIENT-IMP: NORMAL
BLOOD PRODUCT CODE: NORMAL
BLOOD PRODUCT CODE: NORMAL
BPU ID: NORMAL
BPU ID: NORMAL
BUN SERPL-MCNC: 12 MG/DL (ref 7–30)
BUN SERPL-MCNC: 12 MG/DL (ref 7–30)
CALCIUM SERPL-MCNC: 8.1 MG/DL (ref 8.5–10.1)
CALCIUM SERPL-MCNC: 8.2 MG/DL (ref 8.5–10.1)
CHLORIDE SERPL-SCNC: 101 MMOL/L (ref 94–109)
CHLORIDE SERPL-SCNC: 102 MMOL/L (ref 94–109)
CO2 SERPL-SCNC: 25 MMOL/L (ref 20–32)
CO2 SERPL-SCNC: 27 MMOL/L (ref 20–32)
CREAT SERPL-MCNC: 0.38 MG/DL (ref 0.52–1.04)
CREAT SERPL-MCNC: 0.41 MG/DL (ref 0.52–1.04)
CRP SERPL-MCNC: 310 MG/L (ref 0–8)
ERYTHROCYTE [DISTWIDTH] IN BLOOD BY AUTOMATED COUNT: 16.2 % (ref 10–15)
ERYTHROCYTE [DISTWIDTH] IN BLOOD BY AUTOMATED COUNT: 17.2 % (ref 10–15)
GFR SERPL CREATININE-BSD FRML MDRD: ABNORMAL ML/MIN/1.7M2
GFR SERPL CREATININE-BSD FRML MDRD: ABNORMAL ML/MIN/1.7M2
GLUCOSE BLDC GLUCOMTR-MCNC: 102 MG/DL (ref 70–99)
GLUCOSE BLDC GLUCOMTR-MCNC: 103 MG/DL (ref 70–99)
GLUCOSE BLDC GLUCOMTR-MCNC: 69 MG/DL (ref 70–99)
GLUCOSE BLDC GLUCOMTR-MCNC: 80 MG/DL (ref 70–99)
GLUCOSE BLDC GLUCOMTR-MCNC: 86 MG/DL (ref 70–99)
GLUCOSE BLDC GLUCOMTR-MCNC: 97 MG/DL (ref 70–99)
GLUCOSE BLDC GLUCOMTR-MCNC: 98 MG/DL (ref 70–99)
GLUCOSE SERPL-MCNC: 108 MG/DL (ref 70–99)
GLUCOSE SERPL-MCNC: 96 MG/DL (ref 70–99)
HCT VFR BLD AUTO: 22.5 % (ref 35–47)
HCT VFR BLD AUTO: 23.9 % (ref 35–47)
HGB BLD-MCNC: 7.1 G/DL (ref 11.7–15.7)
HGB BLD-MCNC: 7.6 G/DL (ref 11.7–15.7)
LACTATE BLD-SCNC: 2.5 MMOL/L (ref 0.7–2.1)
MAGNESIUM SERPL-MCNC: 1.7 MG/DL (ref 1.6–2.3)
MAGNESIUM SERPL-MCNC: 1.9 MG/DL (ref 1.6–2.3)
MAGNESIUM SERPL-MCNC: 1.9 MG/DL (ref 1.6–2.3)
MCH RBC QN AUTO: 26.4 PG (ref 26.5–33)
MCH RBC QN AUTO: 27.1 PG (ref 26.5–33)
MCHC RBC AUTO-ENTMCNC: 31.6 G/DL (ref 31.5–36.5)
MCHC RBC AUTO-ENTMCNC: 31.8 G/DL (ref 31.5–36.5)
MCV RBC AUTO: 84 FL (ref 78–100)
MCV RBC AUTO: 85 FL (ref 78–100)
NUM BPU REQUESTED: 2
PHOSPHATE SERPL-MCNC: 2.7 MG/DL (ref 2.5–4.5)
PHOSPHATE SERPL-MCNC: 2.8 MG/DL (ref 2.5–4.5)
PHOSPHATE SERPL-MCNC: 2.9 MG/DL (ref 2.5–4.5)
PLATELET # BLD AUTO: 260 10E9/L (ref 150–450)
PLATELET # BLD AUTO: 345 10E9/L (ref 150–450)
POTASSIUM SERPL-SCNC: 3.2 MMOL/L (ref 3.4–5.3)
POTASSIUM SERPL-SCNC: 3.4 MMOL/L (ref 3.4–5.3)
POTASSIUM SERPL-SCNC: 3.4 MMOL/L (ref 3.4–5.3)
POTASSIUM SERPL-SCNC: 3.8 MMOL/L (ref 3.4–5.3)
RADIOLOGIST FLAGS: ABNORMAL
RBC # BLD AUTO: 2.69 10E12/L (ref 3.8–5.2)
RBC # BLD AUTO: 2.8 10E12/L (ref 3.8–5.2)
SODIUM SERPL-SCNC: 135 MMOL/L (ref 133–144)
SODIUM SERPL-SCNC: 135 MMOL/L (ref 133–144)
SPECIMEN EXP DATE BLD: NORMAL
TRANSFUSION STATUS PATIENT QL: NORMAL
WBC # BLD AUTO: 8 10E9/L (ref 4–11)
WBC # BLD AUTO: 9 10E9/L (ref 4–11)

## 2017-04-21 PROCEDURE — 36000055 ZZH SURGERY LEVEL 2 W FLUORO 1ST 30 MIN - UMMC: Performed by: THORACIC SURGERY (CARDIOTHORACIC VASCULAR SURGERY)

## 2017-04-21 PROCEDURE — 25000132 ZZH RX MED GY IP 250 OP 250 PS 637: Mod: GY | Performed by: SURGERY

## 2017-04-21 PROCEDURE — 25000125 ZZHC RX 250: Performed by: SURGERY

## 2017-04-21 PROCEDURE — 86140 C-REACTIVE PROTEIN: CPT | Performed by: SURGERY

## 2017-04-21 PROCEDURE — 40000275 ZZH STATISTIC RCP TIME EA 10 MIN

## 2017-04-21 PROCEDURE — 0D20XUZ CHANGE FEEDING DEVICE IN UPPER INTESTINAL TRACT, EXTERNAL APPROACH: ICD-10-PCS | Performed by: THORACIC SURGERY (CARDIOTHORACIC VASCULAR SURGERY)

## 2017-04-21 PROCEDURE — 40000170 ZZH STATISTIC PRE-PROCEDURE ASSESSMENT II: Performed by: THORACIC SURGERY (CARDIOTHORACIC VASCULAR SURGERY)

## 2017-04-21 PROCEDURE — 36000053 ZZH SURGERY LEVEL 2 EA 15 ADDTL MIN - UMMC: Performed by: THORACIC SURGERY (CARDIOTHORACIC VASCULAR SURGERY)

## 2017-04-21 PROCEDURE — 85027 COMPLETE CBC AUTOMATED: CPT | Performed by: SURGERY

## 2017-04-21 PROCEDURE — 71000015 ZZH RECOVERY PHASE 1 LEVEL 2 EA ADDTL HR: Performed by: THORACIC SURGERY (CARDIOTHORACIC VASCULAR SURGERY)

## 2017-04-21 PROCEDURE — 25800025 ZZH RX 258: Performed by: STUDENT IN AN ORGANIZED HEALTH CARE EDUCATION/TRAINING PROGRAM

## 2017-04-21 PROCEDURE — 25000132 ZZH RX MED GY IP 250 OP 250 PS 637: Mod: GY | Performed by: PHYSICIAN ASSISTANT

## 2017-04-21 PROCEDURE — A9270 NON-COVERED ITEM OR SERVICE: HCPCS | Mod: GY | Performed by: SURGERY

## 2017-04-21 PROCEDURE — 71000014 ZZH RECOVERY PHASE 1 LEVEL 2 FIRST HR: Performed by: THORACIC SURGERY (CARDIOTHORACIC VASCULAR SURGERY)

## 2017-04-21 PROCEDURE — 25000128 H RX IP 250 OP 636: Performed by: ANESTHESIOLOGY

## 2017-04-21 PROCEDURE — 25000128 H RX IP 250 OP 636: Performed by: STUDENT IN AN ORGANIZED HEALTH CARE EDUCATION/TRAINING PROGRAM

## 2017-04-21 PROCEDURE — 84100 ASSAY OF PHOSPHORUS: CPT | Performed by: SURGERY

## 2017-04-21 PROCEDURE — P9016 RBC LEUKOCYTES REDUCED: HCPCS | Performed by: SURGERY

## 2017-04-21 PROCEDURE — 25000132 ZZH RX MED GY IP 250 OP 250 PS 637: Mod: GY | Performed by: STUDENT IN AN ORGANIZED HEALTH CARE EDUCATION/TRAINING PROGRAM

## 2017-04-21 PROCEDURE — 37000009 ZZH ANESTHESIA TECHNICAL FEE, EACH ADDTL 15 MIN: Performed by: THORACIC SURGERY (CARDIOTHORACIC VASCULAR SURGERY)

## 2017-04-21 PROCEDURE — 25000132 ZZH RX MED GY IP 250 OP 250 PS 637: Mod: GY | Performed by: NURSE PRACTITIONER

## 2017-04-21 PROCEDURE — 83735 ASSAY OF MAGNESIUM: CPT | Performed by: SURGERY

## 2017-04-21 PROCEDURE — 25000128 H RX IP 250 OP 636: Performed by: NURSE ANESTHETIST, CERTIFIED REGISTERED

## 2017-04-21 PROCEDURE — 40000277 XR SURGERY CARM FLUORO LESS THAN 5 MIN W STILLS

## 2017-04-21 PROCEDURE — 25000565 ZZH ISOFLURANE, EA 15 MIN: Performed by: THORACIC SURGERY (CARDIOTHORACIC VASCULAR SURGERY)

## 2017-04-21 PROCEDURE — P9041 ALBUMIN (HUMAN),5%, 50ML: HCPCS | Performed by: NURSE ANESTHETIST, CERTIFIED REGISTERED

## 2017-04-21 PROCEDURE — 00000146 ZZHCL STATISTIC GLUCOSE BY METER IP

## 2017-04-21 PROCEDURE — C1769 GUIDE WIRE: HCPCS | Performed by: THORACIC SURGERY (CARDIOTHORACIC VASCULAR SURGERY)

## 2017-04-21 PROCEDURE — 36592 COLLECT BLOOD FROM PICC: CPT | Performed by: SURGERY

## 2017-04-21 PROCEDURE — A9270 NON-COVERED ITEM OR SERVICE: HCPCS | Mod: GY | Performed by: NURSE PRACTITIONER

## 2017-04-21 PROCEDURE — 25800025 ZZH RX 258: Performed by: SURGERY

## 2017-04-21 PROCEDURE — 12000006 ZZH R&B IMCU INTERMEDIATE UMMC

## 2017-04-21 PROCEDURE — 94640 AIRWAY INHALATION TREATMENT: CPT | Mod: 76

## 2017-04-21 PROCEDURE — 25800025 ZZH RX 258: Performed by: ANESTHESIOLOGY

## 2017-04-21 PROCEDURE — 37000008 ZZH ANESTHESIA TECHNICAL FEE, 1ST 30 MIN: Performed by: THORACIC SURGERY (CARDIOTHORACIC VASCULAR SURGERY)

## 2017-04-21 PROCEDURE — 71010 XR CHEST PORT 1 VW: CPT

## 2017-04-21 PROCEDURE — 93010 ELECTROCARDIOGRAM REPORT: CPT | Performed by: INTERNAL MEDICINE

## 2017-04-21 PROCEDURE — A9270 NON-COVERED ITEM OR SERVICE: HCPCS | Mod: GY | Performed by: STUDENT IN AN ORGANIZED HEALTH CARE EDUCATION/TRAINING PROGRAM

## 2017-04-21 PROCEDURE — 25000125 ZZHC RX 250: Performed by: PHYSICIAN ASSISTANT

## 2017-04-21 PROCEDURE — 94640 AIRWAY INHALATION TREATMENT: CPT

## 2017-04-21 PROCEDURE — 0DJ08ZZ INSPECTION OF UPPER INTESTINAL TRACT, VIA NATURAL OR ARTIFICIAL OPENING ENDOSCOPIC: ICD-10-PCS | Performed by: THORACIC SURGERY (CARDIOTHORACIC VASCULAR SURGERY)

## 2017-04-21 PROCEDURE — 25000128 H RX IP 250 OP 636: Performed by: SURGERY

## 2017-04-21 PROCEDURE — 80048 BASIC METABOLIC PNL TOTAL CA: CPT | Performed by: SURGERY

## 2017-04-21 PROCEDURE — 25000125 ZZHC RX 250: Performed by: NURSE PRACTITIONER

## 2017-04-21 PROCEDURE — 83605 ASSAY OF LACTIC ACID: CPT | Performed by: SURGERY

## 2017-04-21 PROCEDURE — 84132 ASSAY OF SERUM POTASSIUM: CPT | Performed by: SURGERY

## 2017-04-21 PROCEDURE — 25000125 ZZHC RX 250: Performed by: NURSE ANESTHETIST, CERTIFIED REGISTERED

## 2017-04-21 PROCEDURE — 71020 XR CHEST 2 VW: CPT

## 2017-04-21 PROCEDURE — A9270 NON-COVERED ITEM OR SERVICE: HCPCS | Mod: GY | Performed by: PHYSICIAN ASSISTANT

## 2017-04-21 PROCEDURE — 27210794 ZZH OR GENERAL SUPPLY STERILE: Performed by: THORACIC SURGERY (CARDIOTHORACIC VASCULAR SURGERY)

## 2017-04-21 PROCEDURE — 93005 ELECTROCARDIOGRAM TRACING: CPT

## 2017-04-21 RX ORDER — FENTANYL CITRATE 50 UG/ML
INJECTION, SOLUTION INTRAMUSCULAR; INTRAVENOUS PRN
Status: DISCONTINUED | OUTPATIENT
Start: 2017-04-21 | End: 2017-04-21

## 2017-04-21 RX ORDER — LIDOCAINE 40 MG/G
CREAM TOPICAL
Status: DISCONTINUED | OUTPATIENT
Start: 2017-04-21 | End: 2017-04-21 | Stop reason: HOSPADM

## 2017-04-21 RX ORDER — LOPERAMIDE HYDROCHLORIDE 1 MG/5ML
2 SOLUTION ORAL 3 TIMES DAILY PRN
Status: DISCONTINUED | OUTPATIENT
Start: 2017-04-21 | End: 2017-04-23

## 2017-04-21 RX ORDER — DEXTROSE MONOHYDRATE, SODIUM CHLORIDE, AND POTASSIUM CHLORIDE 50; 1.49; 4.5 G/1000ML; G/1000ML; G/1000ML
INJECTION, SOLUTION INTRAVENOUS CONTINUOUS
Status: DISCONTINUED | OUTPATIENT
Start: 2017-04-21 | End: 2017-04-23

## 2017-04-21 RX ORDER — SODIUM CHLORIDE, SODIUM LACTATE, POTASSIUM CHLORIDE, CALCIUM CHLORIDE 600; 310; 30; 20 MG/100ML; MG/100ML; MG/100ML; MG/100ML
INJECTION, SOLUTION INTRAVENOUS CONTINUOUS
Status: DISCONTINUED | OUTPATIENT
Start: 2017-04-21 | End: 2017-04-21 | Stop reason: HOSPADM

## 2017-04-21 RX ORDER — BISACODYL 10 MG
10 SUPPOSITORY, RECTAL RECTAL DAILY
Status: DISCONTINUED | OUTPATIENT
Start: 2017-04-21 | End: 2017-04-21

## 2017-04-21 RX ORDER — ONDANSETRON 2 MG/ML
4 INJECTION INTRAMUSCULAR; INTRAVENOUS EVERY 30 MIN PRN
Status: DISCONTINUED | OUTPATIENT
Start: 2017-04-21 | End: 2017-04-21 | Stop reason: HOSPADM

## 2017-04-21 RX ORDER — CEFAZOLIN SODIUM 2 G/100ML
2 INJECTION, SOLUTION INTRAVENOUS
Status: COMPLETED | OUTPATIENT
Start: 2017-04-21 | End: 2017-04-21

## 2017-04-21 RX ORDER — PROPOFOL 10 MG/ML
INJECTION, EMULSION INTRAVENOUS PRN
Status: DISCONTINUED | OUTPATIENT
Start: 2017-04-21 | End: 2017-04-21

## 2017-04-21 RX ORDER — ALBUMIN, HUMAN INJ 5% 5 %
SOLUTION INTRAVENOUS CONTINUOUS PRN
Status: DISCONTINUED | OUTPATIENT
Start: 2017-04-21 | End: 2017-04-21

## 2017-04-21 RX ORDER — ONDANSETRON HYDROCHLORIDE 4 MG/5ML
4-8 SOLUTION ORAL EVERY 6 HOURS PRN
Status: DISCONTINUED | OUTPATIENT
Start: 2017-04-21 | End: 2017-04-28 | Stop reason: HOSPADM

## 2017-04-21 RX ORDER — FENTANYL CITRATE 50 UG/ML
25-50 INJECTION, SOLUTION INTRAMUSCULAR; INTRAVENOUS
Status: DISCONTINUED | OUTPATIENT
Start: 2017-04-21 | End: 2017-04-21 | Stop reason: HOSPADM

## 2017-04-21 RX ORDER — CEFAZOLIN SODIUM 1 G/3ML
1 INJECTION, POWDER, FOR SOLUTION INTRAMUSCULAR; INTRAVENOUS SEE ADMIN INSTRUCTIONS
Status: DISCONTINUED | OUTPATIENT
Start: 2017-04-21 | End: 2017-04-21 | Stop reason: HOSPADM

## 2017-04-21 RX ORDER — MAGNESIUM SULFATE HEPTAHYDRATE 40 MG/ML
4 INJECTION, SOLUTION INTRAVENOUS EVERY 4 HOURS PRN
Status: DISCONTINUED | OUTPATIENT
Start: 2017-04-21 | End: 2017-04-21

## 2017-04-21 RX ORDER — HYDROMORPHONE HYDROCHLORIDE 1 MG/ML
.3-.5 INJECTION, SOLUTION INTRAMUSCULAR; INTRAVENOUS; SUBCUTANEOUS EVERY 5 MIN PRN
Status: DISCONTINUED | OUTPATIENT
Start: 2017-04-21 | End: 2017-04-21 | Stop reason: HOSPADM

## 2017-04-21 RX ORDER — ONDANSETRON 4 MG/1
4 TABLET, ORALLY DISINTEGRATING ORAL EVERY 30 MIN PRN
Status: DISCONTINUED | OUTPATIENT
Start: 2017-04-21 | End: 2017-04-21 | Stop reason: HOSPADM

## 2017-04-21 RX ORDER — MORPHINE 10 MG/ML
6 TINCTURE ORAL EVERY 6 HOURS
Status: DISCONTINUED | OUTPATIENT
Start: 2017-04-21 | End: 2017-04-28 | Stop reason: HOSPADM

## 2017-04-21 RX ORDER — HEPARIN SODIUM 5000 [USP'U]/.5ML
5000 INJECTION, SOLUTION INTRAVENOUS; SUBCUTANEOUS EVERY 8 HOURS
Status: DISCONTINUED | OUTPATIENT
Start: 2017-04-22 | End: 2017-04-26

## 2017-04-21 RX ADMIN — CHLORHEXIDINE GLUCONATE 15 ML: 1.2 RINSE ORAL at 08:59

## 2017-04-21 RX ADMIN — ONDANSETRON 4 MG: 2 INJECTION INTRAMUSCULAR; INTRAVENOUS at 12:11

## 2017-04-21 RX ADMIN — PROPOFOL 50 MG: 10 INJECTION, EMULSION INTRAVENOUS at 12:01

## 2017-04-21 RX ADMIN — MULTIVITAMIN 15 ML: LIQUID ORAL at 08:54

## 2017-04-21 RX ADMIN — SODIUM CHLORIDE SOLN NEBU 3% 3 ML: 3 NEBU SOLN at 20:16

## 2017-04-21 RX ADMIN — METHOCARBAMOL 500 MG: 500 TABLET ORAL at 00:28

## 2017-04-21 RX ADMIN — DOXEPIN HYDROCHLORIDE 3 MG: 10 SOLUTION ORAL at 22:33

## 2017-04-21 RX ADMIN — OXYCODONE HYDROCHLORIDE 5 MG: 5 SOLUTION ORAL at 05:40

## 2017-04-21 RX ADMIN — MIDAZOLAM HYDROCHLORIDE 2 MG: 1 INJECTION, SOLUTION INTRAMUSCULAR; INTRAVENOUS at 11:45

## 2017-04-21 RX ADMIN — LEVALBUTEROL HYDROCHLORIDE 0.63 MG: 0.63 SOLUTION RESPIRATORY (INHALATION) at 20:17

## 2017-04-21 RX ADMIN — METHOCARBAMOL 500 MG: 500 TABLET ORAL at 05:41

## 2017-04-21 RX ADMIN — ROCURONIUM BROMIDE 20 MG: 10 INJECTION INTRAVENOUS at 12:11

## 2017-04-21 RX ADMIN — METOPROLOL TARTRATE 12.5 MG: 100 TABLET ORAL at 20:58

## 2017-04-21 RX ADMIN — SODIUM CHLORIDE, POTASSIUM CHLORIDE, SODIUM LACTATE AND CALCIUM CHLORIDE 500 ML: 600; 310; 30; 20 INJECTION, SOLUTION INTRAVENOUS at 22:54

## 2017-04-21 RX ADMIN — SODIUM CHLORIDE, POTASSIUM CHLORIDE, SODIUM LACTATE AND CALCIUM CHLORIDE: 600; 310; 30; 20 INJECTION, SOLUTION INTRAVENOUS at 11:45

## 2017-04-21 RX ADMIN — METOPROLOL TARTRATE 12.5 MG: 100 TABLET ORAL at 08:54

## 2017-04-21 RX ADMIN — HEPARIN SODIUM 5000 UNITS: 5000 INJECTION, SOLUTION INTRAVENOUS; SUBCUTANEOUS at 03:37

## 2017-04-21 RX ADMIN — ANTISEPTIC SURGICAL SCRUB: 0.04 SOLUTION TOPICAL at 21:23

## 2017-04-21 RX ADMIN — OXYCODONE HYDROCHLORIDE 10 MG: 5 SOLUTION ORAL at 00:28

## 2017-04-21 RX ADMIN — ACETAMINOPHEN 975 MG: 325 SOLUTION ORAL at 08:54

## 2017-04-21 RX ADMIN — DEXTROSE MONOHYDRATE 25 ML: 25 INJECTION, SOLUTION INTRAVENOUS at 15:28

## 2017-04-21 RX ADMIN — OXYCODONE HYDROCHLORIDE 10 MG: 5 SOLUTION ORAL at 03:37

## 2017-04-21 RX ADMIN — FENTANYL CITRATE 150 MCG: 50 INJECTION, SOLUTION INTRAMUSCULAR; INTRAVENOUS at 12:01

## 2017-04-21 RX ADMIN — OXYCODONE HYDROCHLORIDE 10 MG: 5 SOLUTION ORAL at 20:58

## 2017-04-21 RX ADMIN — GABAPENTIN 200 MG: 250 SUSPENSION ORAL at 05:41

## 2017-04-21 RX ADMIN — ACETAMINOPHEN 975 MG: 325 SOLUTION ORAL at 20:58

## 2017-04-21 RX ADMIN — CHLORHEXIDINE GLUCONATE 15 ML: 1.2 RINSE ORAL at 21:14

## 2017-04-21 RX ADMIN — Medication 6 MG: at 08:54

## 2017-04-21 RX ADMIN — SUCCINYLCHOLINE CHLORIDE 100 MG: 20 INJECTION, SOLUTION INTRAMUSCULAR; INTRAVENOUS at 12:01

## 2017-04-21 RX ADMIN — POTASSIUM CHLORIDE, DEXTROSE MONOHYDRATE AND SODIUM CHLORIDE: 150; 5; 450 INJECTION, SOLUTION INTRAVENOUS at 16:33

## 2017-04-21 RX ADMIN — ALBUMIN (HUMAN): 12.5 SOLUTION INTRAVENOUS at 12:20

## 2017-04-21 RX ADMIN — LEVALBUTEROL HYDROCHLORIDE 0.63 MG: 0.63 SOLUTION RESPIRATORY (INHALATION) at 16:04

## 2017-04-21 RX ADMIN — OXYCODONE HYDROCHLORIDE 5 MG: 5 SOLUTION ORAL at 02:31

## 2017-04-21 RX ADMIN — SODIUM CHLORIDE, PRESERVATIVE FREE 5 ML: 5 INJECTION INTRAVENOUS at 06:23

## 2017-04-21 RX ADMIN — SODIUM CHLORIDE, PRESERVATIVE FREE 5 ML: 5 INJECTION INTRAVENOUS at 16:45

## 2017-04-21 RX ADMIN — PHENYLEPHRINE HYDROCHLORIDE 300 MCG: 10 INJECTION, SOLUTION INTRAMUSCULAR; INTRAVENOUS; SUBCUTANEOUS at 12:15

## 2017-04-21 RX ADMIN — HYDROMORPHONE HYDROCHLORIDE: 10 INJECTION, SOLUTION INTRAMUSCULAR; INTRAVENOUS; SUBCUTANEOUS at 15:09

## 2017-04-21 RX ADMIN — OXYCODONE HYDROCHLORIDE 10 MG: 5 SOLUTION ORAL at 08:53

## 2017-04-21 RX ADMIN — HYDROMORPHONE HYDROCHLORIDE: 10 INJECTION, SOLUTION INTRAMUSCULAR; INTRAVENOUS; SUBCUTANEOUS at 19:39

## 2017-04-21 RX ADMIN — SODIUM CHLORIDE, POTASSIUM CHLORIDE, SODIUM LACTATE AND CALCIUM CHLORIDE: 600; 310; 30; 20 INJECTION, SOLUTION INTRAVENOUS at 03:27

## 2017-04-21 RX ADMIN — HYDROMORPHONE HYDROCHLORIDE 0.5 MG: 10 INJECTION, SOLUTION INTRAMUSCULAR; INTRAVENOUS; SUBCUTANEOUS at 13:54

## 2017-04-21 RX ADMIN — PHENYLEPHRINE HYDROCHLORIDE 100 MCG: 10 INJECTION, SOLUTION INTRAMUSCULAR; INTRAVENOUS; SUBCUTANEOUS at 12:24

## 2017-04-21 RX ADMIN — ACETAMINOPHEN 975 MG: 325 SOLUTION ORAL at 00:28

## 2017-04-21 RX ADMIN — CEFAZOLIN SODIUM 2 G: 2 INJECTION, SOLUTION INTRAVENOUS at 12:05

## 2017-04-21 RX ADMIN — GABAPENTIN 200 MG: 250 SUSPENSION ORAL at 22:33

## 2017-04-21 RX ADMIN — PHENYLEPHRINE HYDROCHLORIDE 200 MCG: 10 INJECTION, SOLUTION INTRAMUSCULAR; INTRAVENOUS; SUBCUTANEOUS at 12:10

## 2017-04-21 RX ADMIN — FENTANYL CITRATE 50 MCG: 50 INJECTION, SOLUTION INTRAMUSCULAR; INTRAVENOUS at 11:45

## 2017-04-21 RX ADMIN — POTASSIUM PHOSPHATE, MONOBASIC AND POTASSIUM PHOSPHATE, DIBASIC 10 MMOL: 224; 236 INJECTION, SOLUTION INTRAVENOUS at 20:59

## 2017-04-21 RX ADMIN — Medication 6 MG: at 21:14

## 2017-04-21 RX ADMIN — Medication 2 G: at 17:51

## 2017-04-21 RX ADMIN — PANTOPRAZOLE SODIUM 40 MG: 40 TABLET, DELAYED RELEASE ORAL at 08:54

## 2017-04-21 ASSESSMENT — PAIN DESCRIPTION - DESCRIPTORS
DESCRIPTORS: CONSTANT
DESCRIPTORS: CONSTANT
DESCRIPTORS: CONSTANT;ACHING
DESCRIPTORS: CONSTANT
DESCRIPTORS: ACHING;DISCOMFORT;CONSTANT
DESCRIPTORS: CONSTANT
DESCRIPTORS: CONSTANT;DISCOMFORT
DESCRIPTORS: CONSTANT

## 2017-04-21 ASSESSMENT — LIFESTYLE VARIABLES: TOBACCO_USE: 1

## 2017-04-21 NOTE — PROGRESS NOTES
Care Coordinator Progress Note     Admission Date/Time:  4/17/2017  Attending MD:  Andrea Knapp*     Data  Chart reviewed, discussed with interdisciplinary team.   Patient was admitted for:    Esophageal perforation  On warfarin therapy.    Concerns with insurance coverage for discharge needs: None identified  Current Living Situation: Patient lives with   Support System: Enrique is Primary Caregiver  Services Involved: [facility] #[phone] Tube Feeding support                                [facility] #[phone]   RN visits 2x/week  Transportation:   Barriers to Discharge: None identified       Assessment  Pt with history of Esophageal discontinuity, s/p retrosternal gastric pull-through, spit fistula takedown, lysis of adhesions, J tube placement, pharyngostomy placement 4/17/17. Pt transferred to  from ICU last night.  Pt known to me from last hospitalization in January when she went to [facility].   Prior to admission, Pt was living with her , [facility] feeding [facility] supplies. [facility] was providing Nursing visits twice per week.  CC will follow.     Plan  Anticipated Discharge Date:  TBD  Anticipated Discharge Plan:  TBD    Lexus Monique, RN    care coordinator #[phone]

## 2017-04-21 NOTE — BRIEF OP NOTE
[facility], [place]    Brief Operative Note    Pre-operative diagnosis: Status Post Esophagectomy     Post-operative diagnosis * No post-op diagnosis entered *    Procedure: Procedure(s):  Esophagogastroduodenoscopy, Pharyngostomy Tube Placement  - Wound Class: II-Clean Contaminated   - Wound Class: II-Clean Contaminated    Surgeon: Surgeon(s) and Role:     * Jens Wise MD - Primary    Anesthesia: General     Estimated blood loss: Minimal    Drains:  Pharyngostomy    Specimens: None    Findings:   No gastritis on EGD.    Complications: None.    Implants: None.      - - - - - - - - - - - -  Jd Garcia MD  PGY-1, General Surgery  [facility]  Pager: [phone]

## 2017-04-21 NOTE — PLAN OF CARE
Problem: Goal Outcome Summary  Goal: Goal Outcome Summary  PT 6B: CANCEL; pt to OR this date. Will reschedule.

## 2017-04-21 NOTE — PLAN OF CARE
Problem: Goal Outcome Summary  Goal: Goal Outcome Summary  Outcome: No Change  Pt A&O X4, VSS, afebrile, HR sinus tachy 100-110's, BP's 130's/70's, O2 sats > 90% on RA. LS clear/crackles, BS+ X4, CMS intact. Pt slept part of overnight shift, had minimal requests/complaints. Reported significant incisional pain, received PRN Oxycodone Q3hrs with scheduled Oxycodone, Tylenol, Robaxin with Dilaudid PCA infusing @ 0.3mg bumps Q10 minutes. PICCX2 to L arm patent & infusing LR @ 50mL/hr. Pharyngostomy tube currently clamped, see prior note. Chest x-ray completed at bedside to verify placement. J-tube patent & infusing TF @ 10mL/hr with 30mL FWF Q4hrs, has been NPO. BG's checked Q4hrs, 80's, no insulin given. Pt gets up with SBA+1, turns to bedside commode. Voiding small amounts of urine, passing gas with 3 loose BM's post-stool softeners. Able to make needs known via call light, will continue to monitor per plan of care.

## 2017-04-21 NOTE — PLAN OF CARE
Problem: Goal Outcome Summary  Goal: Goal Outcome Summary  Outcome: Improving    Pt transferred from  via cart accompanied by RN. 2 RN skin assessment performed by Lynn RN and Marilin RN.     Pt is alert and oriented x4; BPs stable; sinus tachycardia 100 - 110s with noted 10-beat run of V-tach; lytes re-ordered; afebrile; room air sats >96%. C/o incisional pain at chest/neck and some in abdomen; dilaudid PCA 0.3mg [id number]; using liberally. Oxy prn also given. MVI at 50/hr. Incisions stapled; C/D/I; open to air; no drainage noted. R CT to waterseal; serosanguineous drainage; no airleak; C/D/I. L CT site covered; ARTURO. L pharyngostomy tube to LIS; C/D/I. No episodes of vomiting now with continuing suction. J-tube with TF at 10/hr; tolerating well; bowel sounds active; needs to have a BM; up to commode. UOP low but emptying fine; bladder scanned for 26mL PVR. Will continue to monitor.

## 2017-04-21 NOTE — OR NURSING
Patient having difficult time vocalizing. Hoarse whisper voice. Discussed with Anesthesia, Dr. Ramirez, who will come to examine.

## 2017-04-21 NOTE — PLAN OF CARE
Problem: Goal Outcome Summary  Goal: Goal Outcome Summary  OT-6B: Cancel. Pt declined therapy this AM in anticipation of OR.

## 2017-04-21 NOTE — PROGRESS NOTES
Thoracic Surgery Progress Note  Minerva Blanco  [id number]    Subjective  Patient transferred to  yesterday evening. Short run of VT around 10pm; electrolytes were checked and within normal limits; no further action taken overnight. RN noted pharyngostomy not flushing properly early this morning; CXR showed tube to be in good position; patient regurgitated when flush attempted by RN and night MD; pharyngostomy off LIS. Patient states she had a very uncomfortable night; patient and  very worried this morning. Has had several BMs overnight and asks to hold further laxatives.      Objective  Temp:  [98  F (36.7  C)-98.9  F (37.2  C)] 98.9  F (37.2  C)  Heart Rate:  [] 122  Resp:  [6-17] 17  BP: ()/(51-87) 122/68  SpO2:  [95 %-99 %] 96 %    Physical Exam:  Gen: Awake, alert, NAD, upset  HEENT: pharyngostomy in place, not kinked externally  CVS: RRR  Resp: NLB on RA, CT serosang  Abd: Soft, nt/nd, incisions c/d/i  Extrem: WWP    Intake/Output Summary (Last 24 hours) at 04/21/17 1037  Last data filed at 04/21/17 1000   Gross per 24 hour   Intake             1751 ml   Output             1752 ml   Net               -1 ml     UOP: 560 mL  CT: 110 mL  Pharyngostomy: 1150 mL      Recent Labs  Lab 04/21/17  0626      POTASSIUM 3.4   CHLORIDE 101   ANNABELLE 8.2*   CO2 25   BUN 12   CR 0.38*   *         Recent Labs  Lab 04/21/17  0626   WBC 8.0   HGB 7.1*          Imaging:  Exam: XR CHEST PORT 1 VW, 4/21/2017 2:52 AM  Impression:   1. Pharyngostomy tube tip over the lower esophagus.  2. Remainder the exam is stable.    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      Neuro: PCA, c/w scheduled and PRN oxy; tylenol, doxepin, gabapentin, robaxin  Resp: PRN NC, CT to water seal  Cardio: Metoprolol BID  GI/FEN: NPO; pharyngostomy off suction for now  - to OR today for replacement of pharyngostomy tube; consent and orders completed  - hold TF for OR  Renal: Only weighs 40kg at baseline 15-20 cc of UOP/hour is normal, her Cr remains stable  Endo: SSI  Heme/ID: Hgb stable   PPx: SQH on hold for OR, no full anticoagulation yet (hx afib), PPI    Dispo: 6B; OR this morning for replacement of pharyngostomy tube      - - - - - - - - - - - -  Jd Garcia MD  PGY-1, General Surgery  [facility]  Pager: [phone]

## 2017-04-21 NOTE — PROVIDER NOTIFICATION
Paged overnight surgery crosscover Dr. Judit Fang, updated that pt did not tolerate pharyngostomy 30mL flush, only received 15mL. Pt stated she felt that she was choking and was very scared; began coughing up green/yellowish sputum immediately during flush. Received order for portable 1 view x-ray to confirm placement. Will continue to monitor per plan of care.

## 2017-04-21 NOTE — ANESTHESIA PREPROCEDURE EVALUATION
Anesthesia Evaluation     . Pt has had prior anesthetic. Type: General and MAC    No history of anesthetic complications          ROS/MED HX    ENT/Pulmonary:     (+)tobacco use, Current use 1 packs/day  , . .    Neurologic:     (+)Multiple Sclerosis limitations: heat and noise sensitivity,     Cardiovascular:     (+) ----. : . . . :. . Previous cardiac testing Echodate:1/9/2017results:Technically difficult study.  Global and regional left ventricular function is hyperdynamic with an LVEF  >70%.  The right ventricular function is hyperdynamic.  Mild pulmonary hypertension is present.  The inferior vena cava is normal. The estimated mean right atrial pressure is  <3 mmHg.  No pericardial effusion is present.  Previous study not available for comparison.date: results:ECG reviewed date:2/22/2017 results:Sinus rizwan with left atrial enlargement date: results:          METS/Exercise Tolerance:  3 - Able to walk 1-2 blocks without stopping   Hematologic:     (+) Anemia, History of Transfusion no previous transfusion reaction -     (-) history of blood clots   Musculoskeletal: Comment: Fibromyalgia - generalized body aches  (+) arthritis, , , -       GI/Hepatic: Comment: History of hiatal hernia w/ associated GERD now s/p surgery.  GERD resolved    (+) Other, Inflammatory bowel disease, Other GI/Hepatic chronic esophageal perforation     GERD: asymptomatic currently, stopped protonix 3 months ago.   Renal/Genitourinary:  - ROS Renal section negative       Endo:  - neg endo ROS       Psychiatric:  - neg psychiatric ROS       Infectious Disease:  - neg infectious disease ROS       Malignancy:   (+) Malignancy History of Breast  Breast CA Remission status post Surgery, Chemo and Radiation.         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  No vision impairment now.   (+) No chance of pregnancy C-spine cleared: N/A, H/O Chronic Pain,H/O chronic opiod use , no other significant  disability                     Allergies   Allergen Reactions     Amoxicillin Diarrhea     Ativan [Lorazepam] Other (See Comments)     Hallucinations     Hydromorphone Itching     4/12/17 - patient open to using this as she tolerated Hydromorphone PCA during hospitalization in January 2017.      Morphine Itching     Prescription Medications as of 4/21/2017             DiphenhydrAMINE HCl (BENADRYL PO) Take 25 mg by mouth nightly as needed    ACETAMINOPHEN PO Take 500-1,000 mg by mouth every 8 hours as needed for pain    OXYCODONE HCL PO Take 5-10 mg by mouth every 6 hours as needed    cholestyramine (QUESTRAN) 4 G Packet Take 1 packet by mouth daily    multivitamins with minerals (CERTAVITE/CEROVITE) LIQD liquid Take 15 mLs by mouth daily    doxepin (SILENOR) 3 MG tablet Take 1 tablet (3 mg) by mouth nightly as needed for sleep    nystatin POWD Apply to peristomal skin with each pouch change until rash is resolved    warfarin (COUMADIN) 2.5 MG tablet Take 1 tablet (2.5 mg) by mouth daily    metoprolol (LOPRESSOR) 50 MG tablet 50 mg BID.  May crush, mix with water and administer via feeding tube    Zinc Sulfate 220 (50 ZN) MG TABS 220 mg daily.  OK to crush, mix with water and administer via feeding tube.    traZODone (DESYREL) 100 MG tablet 1 tablet (100 mg) by Per G Tube route At Bedtime    loperamide (IMODIUM) 1 MG/5ML liquid Take 20 mLs (4 mg) by mouth 4 times daily as needed for diarrhea    Lactobacillus Rhamnosus, GG, (CULTURELLE PO) Take 1 tablet by mouth 2 times daily       Facility Administered Medications as of 4/21/2017             ondansetron (ZOFRAN) solution 4-8 mg 5-10 mLs (4-8 mg) by Per J Tube route every 6 hours as needed for nausea or vomiting    sennosides (SENOKOT) syrup 5 mL 5 mLs by Per J Tube route 2 times daily    opium tincture 10 MG/ML (1%) liquid 6 mg Take 0.6 mLs (6 mg) by mouth every 6 hours    loperamide (IMODIUM) liquid 2 mg Take 10 mLs (2 mg) by mouth 3 times daily as needed for  diarrhea    ceFAZolin sodium-dextrose (ANCEF) infusion 2 g Inject 100 mLs (2 g) into the vein Pre-Op/Pre-procedure x 1 dose    heparin sodium PF injection 5,000 Units Starting on 4/22/2017. Inject 0.5 mLs (5,000 Units) Subcutaneous every 8 hours    bisacodyl (DULCOLAX) Suppository 10 mg (Discontinued) Place 1 suppository (10 mg) rectally daily    ceFAZolin (ANCEF) 1 g vial to attach to  ml bag for ADULT or 50 ml bag for PEDS (Discontinued) Inject 1 g into the vein See Admin Instructions    magnesium sulfate 2 g in NS intermittent infusion (PharMEDium or FV Cmpd) (Discontinued) Inject 50 mLs (2 g) into the vein daily as needed for magnesium supplementation    magnesium sulfate 4 g in 100 mL sterile water (premade) (Discontinued) Inject 100 mLs (4 g) into the vein every 4 hours as needed for magnesium supplementation    sodium phosphate 10 mmol in D5W intermittent infusion (Discontinued) Inject 10 mmol into the vein daily as needed for phosphorous supplementation    sodium phosphate 15 mmol in D5W intermittent infusion (Discontinued) Inject 15 mmol into the vein daily as needed for phosphorous supplementation    sodium phosphate 20 mmol in D5W intermittent infusion (Discontinued) Inject 20 mmol into the vein every 6 hours as needed for phosphorous supplementation (serum phosphorus level 1.1-1.9)    sodium phosphate 25 mmol in D5W intermittent infusion (Discontinued) Inject 25 mmol into the vein every 8 hours as needed for phosphorous supplementation    lidocaine 1 % 1 mL (Discontinued) 1 mL by Other route every hour as needed (mild pain with VAD insertion or accessing implanted port)    lidocaine (LMX4) kit (Discontinued) Apply topically every hour as needed for pain (with VAD insertion or accessing implanted port.)    sodium chloride (PF) 0.9% PF flush 3 mL (Discontinued) 3 mLs by Intracatheter route every hour as needed for line flush (for peripheral IV flush post IV meds)    sodium chloride (PF) 0.9% PF flush  "3 mL (Discontinued) 3 mLs by Intracatheter route every 8 hours    lactated ringers infusion (Discontinued) Inject into the vein continuous    phenylephrine (OLVIER-SYNEPHRINE) injection 1 mg (Discontinued) Inject 1 mg into the vein continuous prn    midazolam (VERSED) injection (Discontinued) Inject into the vein as needed for anxiety    fentaNYL Citrate (PF) (SUBLIMAZE) injection (Discontinued) Inject into the vein as needed for moderate to severe pain    propofol (DIPRIVAN) injection 10 mg/mL vial (Discontinued) Inject into the vein as needed    succinylcholine (ANECTINE) injection (Discontinued) Inject into the vein as needed    rocuronium (ZEMURON) injection (Discontinued) Inject into the vein as needed    albumin human 5 % injection (Discontinued) continuous prn for other    lactated ringers infusion (Discontinued) Inject into the vein continuous    fentaNYL Citrate (PF) (SUBLIMAZE) injection 25-50 mcg (Discontinued) Inject 0.5-1 mLs (25-50 mcg) into the vein every 2 minutes as needed for other (acute pain)    ondansetron (ZOFRAN-ODT) ODT tab 4 mg (Discontinued) Take 1 tablet (4 mg) by mouth every 30 minutes as needed for nausea    Linked Group 1:  \"Or\" Linked Group Details     ondansetron (ZOFRAN) injection 4 mg (Discontinued) Inject 2 mLs (4 mg) into the vein every 30 minutes as needed for nausea    Linked Group 1:  \"Or\" Linked Group Details     prochlorperazine (COMPAZINE) injection 5 mg (Discontinued) Inject 1 mL (5 mg) into the vein every 6 hours as needed for nausea or vomiting    HYDROmorphone (PF) (DILAUDID) injection 0.3-0.5 mg (Discontinued) Inject 0.3-0.5 mg into the vein every 5 minutes as needed for other (acute pain.  May administer if Respiratory Rate is greater than 10)    oxyCODONE (ROXICODONE) solution 10 mg 10 mLs (10 mg) by Oral or Feeding Tube route every 4 hours    oxyCODONE (ROXICODONE) solution 5 mg Take 5 mLs (5 mg) by mouth every 3 hours as needed for moderate to severe pain    traZODone " "(DESYREL) suspension 25 mg Take 5 mLs (25 mg) by mouth nightly as needed for sleep    dextrose 10 % 1,000 mL infusion Inject into the vein continuous prn (Hypoglycemia prevention)    multivitamins with minerals (CERTAVITE/CEROVITE) liquid 15 mL 15 mLs by Per Feeding Tube route daily    EPINEPHrine (ADRENALIN) 0.1 MG/ML injection (Discontinued)     ondansetron (ZOFRAN) injection 4 mg Inject 2 mLs (4 mg) into the vein every 6 hours as needed for nausea or vomiting    Linked Group 2:  \"Or\" Linked Group Details     HYDROmorphone (DILAUDID) PCA 1 mg/mL Inject into the vein PCA    lidocaine (LMX4) kit Apply topically once as needed for moderate pain (for local anesthetic during PICC insertion)    heparin lock flush 10 UNIT/ML injection 2-5 mL 2-5 mLs by Intracatheter route once as needed for line flush (for locking each dormant lumen with line placement)    gabapentin (NEURONTIN) solution 200 mg Take 4 mLs (200 mg) by mouth every 8 hours    methocarbamol (ROBAXIN) tablet 500 mg Take 1 tablet (500 mg) by mouth 4 times daily    sodium chloride (PF) 0.9% PF flush 10-20 mL 10-20 mLs by Intracatheter route every hour as needed for line flush or post meds or blood draw    heparin lock flush 10 UNIT/ML injection 5-10 mL 5-10 mLs by Intracatheter route every 24 hours    heparin lock flush 10 UNIT/ML injection 5-10 mL 5-10 mLs by Intracatheter route every hour as needed for other (to lock each CVC - Open Ended (Tunneled and Non-Tunneled) dormant lumen.)    ondansetron (ZOFRAN-ODT) ODT tab 4 mg (Discontinued) Take 1 tablet (4 mg) by mouth every 6 hours as needed for nausea    Linked Group 2:  \"Or\" Linked Group Details     lidocaine 1 % 1 mL 1 mL by Other route every hour as needed (mild pain with VAD insertion or accessing implanted port)    lidocaine (LMX4) kit Apply topically every hour as needed for pain (with VAD insertion or accessing implanted port.)    sodium chloride (PF) 0.9% PF flush 3 mL 3 mLs by Intracatheter route " "every hour as needed for line flush (for peripheral IV flush post IV meds)    sodium chloride (PF) 0.9% PF flush 3 mL 3 mLs by Intracatheter route every 8 hours    lidocaine (LIDODERM) 5 % Patch 1-2 patch Place 1-2 patches onto the skin every 24 hours at 8 PM    Linked Group 3:  \"And\" Linked Group Details     lidocaine (LIDODERM) patch REMOVAL Place onto the skin every 24 hours at 8 AM    Linked Group 3:  \"And\" Linked Group Details     lidocaine (LIDODERM) Patch in Place Place onto the skin every 8 hours    Linked Group 3:  \"And\" Linked Group Details     potassium chloride 10 mEq in 100 mL intermittent infusion Inject 100 mLs (10 mEq) into the vein every hour as needed for potassium supplementation    potassium chloride 10 mEq in 100 mL intermittent infusion with 10 mg lidocaine Inject 100 mLs (10 mEq) into the vein every hour as needed for potassium supplementation    potassium chloride 20 mEq in 50 mL intermittent infusion Inject 50 mLs (20 mEq) into the vein every hour as needed for potassium supplementation    potassium phosphate 10 mmol in D5W 250 mL intermittent infusion Inject 10 mmol into the vein daily as needed for phosphorous supplementation    potassium phosphate 15 mmol in D5W 250 mL intermittent infusion Inject 15 mmol into the vein daily as needed for phosphorous supplementation    potassium phosphate 20 mmol in D5W 500 mL intermittent infusion Inject 20 mmol into the vein every 6 hours as needed for phosphorous supplementation    potassium phosphate 20 mmol in D5W 250 mL intermittent infusion Inject 20 mmol into the vein every 6 hours as needed for phosphorous supplementation    potassium phosphate 25 mmol in D5W 500 mL intermittent infusion Inject 25 mmol into the vein every 8 hours as needed for phosphorous supplementation    chlorhexidine (HIBICLENS) 4 % liquid Apply topically 2 times daily    chlorhexidine (PERIDEX) 0.12 % solution 15 mL Swish and spit 15 mLs in mouth 2 times daily    metoprolol " "(LOPRESSOR) suspension 12.5 mg 1.25 mLs (12.5 mg) by Per J Tube route 2 times daily    magnesium sulfate 4 g in 100 mL sterile water (premade) Inject 100 mLs (4 g) into the vein every 4 hours as needed for magnesium supplementation    doxepin (SINEquan) 10 MG/ML (HIGH CONC) solution 3 mg Take 0.3 mLs (3 mg) by mouth At Bedtime    magnesium sulfate 2 g in NS intermittent infusion (PharMEDium or FV Cmpd) Inject 50 mLs (2 g) into the vein daily as needed for magnesium supplementation    pantoprazole (PROTONIX) suspension 40 mg 20 mLs (40 mg) by Oral or Feeding Tube route daily    levalbuterol (XOPENEX) neb solution 0.63 mg Take 3 mLs (0.63 mg) by nebulization every 4 hours while awake    sodium chloride 3 % neb solution 3 mL Take 3 mLs by nebulization every 4 hours (while awake)    acetaminophen (TYLENOL) solution 975 mg Take 30.45 mLs (975 mg) by mouth every 8 hours    docusate (COLACE) 50 MG/5ML liquid 100 mg 10 mLs (100 mg) by Per J Tube route At Bedtime    phenol (CHLORASEPTIC) 1.4 % spray 1 mL Take 1 spray (1 mL) by mouth every hour as needed for sore throat    heparin sodium PF injection 5,000 Units (Discontinued) Inject 0.5 mLs (5,000 Units) Subcutaneous every 8 hours    sennosides (SENOKOT) syrup 10 mL (Discontinued) 10 mLs by Per J Tube route 2 times daily    lidocaine (PF) (XYLOCAINE) 1 % injection (Discontinued)     lactated ringers infusion Inject into the vein continuous    glucose 40 % gel 15-30 g Take 15-30 g by mouth every 15 minutes as needed for low blood sugar    Linked Group 4:  \"Or\" Linked Group Details     dextrose 50 % injection 25-50 mL Inject 25-50 mLs into the vein every 15 minutes as needed for low blood sugar    Linked Group 4:  \"Or\" Linked Group Details     glucagon injection 1 mg Inject 1 mg Subcutaneous every 15 minutes as needed for low blood sugar (May repeat x 1 only)    Linked Group 4:  \"Or\" Linked Group Details     naloxone (NARCAN) injection 0.1-0.4 mg Inject 0.25-1 mLs (0.1-0.4 mg) " "into the vein every 2 minutes as needed for opioid reversal    insulin aspart (NovoLOG) inj (RAPID ACTING) Inject 1-6 Units Subcutaneous every 4 hours    prochlorperazine (COMPAZINE) injection 5 mg Inject 1 mL (5 mg) into the vein every 6 hours as needed for nausea or vomiting    Linked Group 5:  \"Or\" Linked Group Details     prochlorperazine (COMPAZINE) tablet 5 mg Take 1 tablet (5 mg) by mouth every 6 hours as needed for vomiting    Linked Group 5:  \"Or\" Linked Group Details     prochlorperazine (COMPAZINE) Suppository 12.5 mg Place 0.5 suppositories (12.5 mg) rectally every 12 hours as needed for nausea or vomiting    Linked Group 5:  \"Or\" Linked Group Details     bupivacaine 0.25 % - EPINEPHrine 1:200,000 injection as needed        PAST MEDICAL HISTORY:   Past Medical History:   Diagnosis Date     Atrial fibrillation (H)      Breast cancer (H) 2007    right side - lumpectomy, chemo, and local radiation     Esophageal perforation      Fibromyalgia      GERD (gastroesophageal reflux disease)      Hiatal hernia      History of blood transfusion      IBS (irritable bowel syndrome)      Meniere's disease     deaf left ear     MS (multiple sclerosis) (H)        PAST SURGICAL HISTORY:   Past Surgical History:   Procedure Laterality Date     APPENDECTOMY       BACK SURGERY  5/1/2015    lumbar fusion     BRONCHOSCOPY FLEXIBLE N/A 4/17/2017    Procedure: BRONCHOSCOPY FLEXIBLE;;  Surgeon: Jens Wise MD;  Location: [facility]     C GASTROSTOMY/JEJUN TUBE      J tube for feedings     CLOSE SPIT FISTULA N/A 4/17/2017    Procedure: CLOSE SPIT FISTULA;;  Surgeon: Jens Wise MD;  Location: [facility]     COMBINED ESOPHAGOSCOPY, GASTROSCOPY, DUODENOSCOPY (EGD), REMOVE ESOPHAGEAL STENT N/A 8/26/2016    Procedure: COMBINED ESOPHAGOSCOPY, GASTROSCOPY, DUODENOSCOPY (EGD), REMOVE ESOPHAGEAL STENT;  Surgeon: Jens Wise MD;  Location: [facility]     CREATE SPIT FISTULA N/A 1/9/2017    Procedure: CREATE SPIT " FISTULA;  Surgeon: Jens Wise MD;  Location: [facility]     ESOPHAGECTOMY N/A 1/9/2017    Procedure: ESOPHAGECTOMY;  Surgeon: Jens Wise MD;  Location: [facility]     ESOPHAGOSCOPY, GASTROSCOPY, DUODENOSCOPY (EGD), COMBINED N/A 4/18/2016    Procedure: COMBINED ESOPHAGOSCOPY, GASTROSCOPY, DUODENOSCOPY (EGD);  Surgeon: Alexis Barraza MD;  Location: [facility]     ESOPHAGOSCOPY, GASTROSCOPY, DUODENOSCOPY (EGD), COMBINED N/A 4/25/2016    Procedure: COMBINED ESOPHAGOSCOPY, GASTROSCOPY, DUODENOSCOPY (EGD);  Surgeon: Alexis Barraza MD;  Location: [facility]     ESOPHAGOSCOPY, GASTROSCOPY, DUODENOSCOPY (EGD), COMBINED N/A 5/4/2016    Procedure: COMBINED ESOPHAGOSCOPY, GASTROSCOPY, DUODENOSCOPY (EGD);  Surgeon: Alexis Barraza MD;  Location: [facility]     ESOPHAGOSCOPY, GASTROSCOPY, DUODENOSCOPY (EGD), COMBINED N/A 5/18/2016    Procedure: COMBINED ESOPHAGOSCOPY, GASTROSCOPY, DUODENOSCOPY (EGD);  Surgeon: Alexis Barraza MD;  Location: [facility]     ESOPHAGOSCOPY, GASTROSCOPY, DUODENOSCOPY (EGD), COMBINED N/A 6/22/2016    Procedure: COMBINED ESOPHAGOSCOPY, GASTROSCOPY, DUODENOSCOPY (EGD);  Surgeon: Alexis Barraza MD;  Location: [facility]     ESOPHAGOSCOPY, GASTROSCOPY, DUODENOSCOPY (EGD), COMBINED N/A 7/12/2016    Procedure: COMBINED ESOPHAGOSCOPY, GASTROSCOPY, DUODENOSCOPY (EGD);  Surgeon: Jens Wise MD;  Location: [facility]     ESOPHAGOSCOPY, GASTROSCOPY, DUODENOSCOPY (EGD), DILATATION, COMBINED N/A 6/29/2016    Procedure: COMBINED ESOPHAGOSCOPY, GASTROSCOPY, DUODENOSCOPY (EGD), DILATATION;  Surgeon: Jens Wise MD;  Location: [facility]     HC UGI ENDOSCOPY W TRANSENDOSCOPIC STENT PLACEMENT N/A 7/12/2016    Procedure: COMBINED ESOPHAGOSCOPY, GASTROSCOPY, DUODENOSCOPY (EGD), PLACE TRANSENDOSCOPIC ESOPHAGEAL STENT;  Surgeon: Jens Wise MD;  Location: [facility]      UGI ENDOSCOPY W TRANSENDOSCOPIC STENT PLACEMENT N/A 7/22/2016     Procedure: COMBINED ESOPHAGOSCOPY, GASTROSCOPY, DUODENOSCOPY (EGD), PLACE TRANSENDOSCOPIC ESOPHAGEAL STENT;  Surgeon: Jens Wise MD;  Location: [facility]     IRRIGATION AND DEBRIDEMENT CHEST WASHOUT, COMBINED N/A 6/29/2016    Procedure: COMBINED IRRIGATION AND DEBRIDEMENT CHEST WASHOUT;  Surgeon: Jens Wise MD;  Location: [facility]     LAPAROSCOPIC ASSISTED INSERTION TUBE JEJUNOSTOMY N/A 4/17/2017    Procedure: LAPAROSCOPIC ASSISTED INSERTION TUBE JEJUNOSTOMY;;  Surgeon: Jens Wise MD;  Location: [facility]     LAPAROSCOPY DIAGNOSTIC (GENERAL) N/A 1/9/2017    Procedure: LAPAROSCOPY DIAGNOSTIC (GENERAL);  Surgeon: Jens Wise MD;  Location: [facility]     LAPAROSCOPY DIAGNOSTIC (GENERAL) N/A 4/17/2017    Procedure: LAPAROSCOPY DIAGNOSTIC (GENERAL);  Laparoscopic , Neck Dissection, Spit Fistula Takedown, Laparoscopic Jejunostomy Tube and Pharyngostomy Tube, Gastric Pull up, Upper Endoscopy(EGD)  , Flexible Bronchoscopy ,Sternotomy ;  Surgeon: Jens Wise MD;  Location: [facility]     LUMPECTOMY BREAST Right 2007     NERVE BLOCK PERIPHERAL N/A 8/30/2016    Procedure: NERVE BLOCK PERIPHERAL;  Surgeon: GENERIC ANESTHESIA PROVIDER;  Location: [facility]     NISSEN FUNDOPLICATION  1/2016     PHARYNGOSTOMY N/A 4/18/2016    Procedure: PHARYNGOSTOMY;  Surgeon: Alexis Barraza MD;  Location: [facility]     PHARYNGOSTOMY N/A 4/25/2016    Procedure: PHARYNGOSTOMY;  Surgeon: Alexis Barraza MD;  Location: [facility]     PHARYNGOSTOMY N/A 5/4/2016    Procedure: PHARYNGOSTOMY;  Surgeon: Alexis Barraza MD;  Location: [facility]     PHARYNGOSTOMY N/A 6/22/2016    Procedure: PHARYNGOSTOMY;  Surgeon: Alexis Barraza MD;  Location: [facility]     PHARYNGOSTOMY N/A 6/29/2016    Procedure: PHARYNGOSTOMY;  Surgeon: Jens Wise MD;  Location: [facility]     PICC INSERTION Left 8/25/2016    5fr DL BioFlo PICC, 42cm (3cm external) in the L medial brachial vein w/  "tip in the SVC RA junction.     THORACOTOMY Right 1998    lung infection - \"hard crust formed on lung\"     THORACOTOMY Left 1/9/2017    Procedure: THORACOTOMY;  Surgeon: Jens Wise MD;  Location: [facility]     WRIST SURGERY         FAMILY HISTORY:   Family History   Problem Relation Age of Onset     Alzheimer Disease Mother      CEREBROVASCULAR DISEASE Father      cerebral hemorrhage     Ovarian Cancer Sister      Breast Cancer Daughter        SOCIAL HISTORY:   Social History   Substance Use Topics     Smoking status: Former Smoker     Packs/day: 1.00     Years: 15.00     Types: Cigarettes     Quit date: 1/1/1998     Smokeless tobacco: Not on file     Alcohol use No     Lab Results   Component Value Date    WBC 8.0 04/21/2017    HGB 7.1 (L) 04/21/2017    HCT 22.5 (L) 04/21/2017     04/21/2017    CHOL 91 01/14/2017    TRIG 41 01/23/2017    HDL 18 (L) 01/14/2017    ALT 56 (H) 04/17/2017    AST 38 04/17/2017     04/21/2017    BUN 12 04/21/2017    CO2 25 04/21/2017    INR 1.92 (H) 04/19/2017                    Anesthesia Plan      History & Physical Review  History and physical reviewed and following examination; no interval change.    ASA Status:  3 .    NPO Status:  > 8 hours    Plan for General and RSI with Intravenous induction. Maintenance will be Balanced.           Postoperative Care      Consents          "

## 2017-04-21 NOTE — PROGRESS NOTES
Report given to 6B RN. All questions asked were answered. Will transfer patient via bed on monitor.

## 2017-04-21 NOTE — OR NURSING
Patient doing well, meeting discharge criteria. Voice marginally improved, still hoarse. Discussed with Dr. Ramirez again who said patient had RSI intubation with some pressure on neck that may be irritating to voice along with bile fluid had to be suctioned from vocal cords also could be irritating. Dr. Ramirez will place sign out when able.

## 2017-04-21 NOTE — ANESTHESIA POSTPROCEDURE EVALUATION
Patient: Minerva Blanco    Procedure(s):  Esophagogastroduodenoscopy, Pharyngostomy Tube Placement  - Wound Class: II-Clean Contaminated   - Wound Class: II-Clean Contaminated    Diagnosis:Status Post Esophagectomy   Diagnosis Additional Information: No value filed.    Anesthesia Type:  No value filed.    Note:  Anesthesia Post Evaluation    Patient location during evaluation: PACU  Patient participation: Able to fully participate in evaluation  Level of consciousness: awake and alert  Pain management: adequate  Airway patency: patent  Cardiovascular status: acceptable and hemodynamically stable  Respiratory status: acceptable  Hydration status: acceptable  PONV: none     Anesthetic complications: None          Last vitals:  Vitals:    04/21/17 1500 04/21/17 1515 04/21/17 1530   BP: 129/78 136/72 138/75   Resp: 20     Temp: 36.9  C (98.4  F)     SpO2: 96%           Electronically Signed By: Neri Ramirez MD  April 21, 2017  3:46 PM

## 2017-04-21 NOTE — PLAN OF CARE
Problem: Goal Outcome Summary  Goal: Goal Outcome Summary  Outcome: No Change  Pt left for OR at 09:25 this morning for pharyngostomy tube replacement.  She was spitting up bile.  She has significant incisional pain.  She is on dilaudid PCA and scheduled and prn oxycodone.  Pt returned from OR at 15:00.  She is sleepy VSS, 4L O2 in place.  Capnography on.  BG 69, 1/2 amp Dextrose given.  MD wants to wait to restart TF till she is a little more alert.  New pharyngostomy tube in place on LIS.  Continue to monitor closely.

## 2017-04-21 NOTE — ANESTHESIA CARE TRANSFER NOTE
Patient: Minerva Blanco    Procedure(s):  Esophagogastroduodenoscopy, Pharyngostomy Tube Placement  - Wound Class: II-Clean Contaminated   - Wound Class: II-Clean Contaminated    Diagnosis: Status Post Esophagectomy   Diagnosis Additional Information: No value filed.    Anesthesia Type:   No value filed.     Note:  Airway :Face Mask  Patient transferred to:PACU  Comments: Pt extubated in the OR without incident or complications. Pt VSS upon arrival to the PACU. Pt has no c/o pain/N/V. Pt care report given to receiving RN. All questions answered.       Vitals: (Last set prior to Anesthesia Care Transfer)    CRNA VITALS  4/21/2017 1232 - 4/21/2017 1308      4/21/2017             Resp Rate (set): 10                Electronically Signed By: CLIVE Marin CRNA  April 21, 2017  1:08 PM

## 2017-04-21 NOTE — PROGRESS NOTES
SURGERY CROSS-COVER NOTE  04/21/2017 4:19 AM    Patient: Minerva Blanco  MRN: [id number]    Subjective  Was paged regarding pharyngostomy tube not flushing easily.     Patient reports the tube has been bothering her because she regurgitates when it is flushed; this just happened starting yesterday. The tube was repositioned by the day team yesterday am. RN reports there has been very little out for his shift from the tube.    Objective  Temp:  [98  F (36.7  C)-99.1  F (37.3  C)] 98.3  F (36.8  C)  Heart Rate:  [] 95  Resp:  [6-21] 16  BP: ()/(51-87) 114/69  SpO2:  [94 %-99 %] 99 %     General: AAO, NAD, lying in bed, appears comfortable  HEENT: pharyngostomy tube sutured in neck and secured to chest with adhesive strip, attempted flush and tube did not flush freely and some resistance was met, patient regurgitated fluid but did not aspirate  Abd: soft, appropriately tender to palpation, non-distended, incisions c/d/i, tube feeds running    I/O last 3 completed shifts:  In: 1888 [I.V.:1423; NG/GT:395]  Out: 1905 [Urine:560; Emesis/NG output:1235; Chest Tube:110]    Assessment/Plan:  Minerva Blanco is a 72 year old female POD#4 retrosternal pullup after prior leak after Touplet fundoplication.     Pharyngostomy tube appears to be nonfunctional, but on CXR is in good position. Having antegrade bowel function. Possibly clogged.    - Aspiration precautions.  - Tube off suction for now.  - Discussed with fellow: ok to use Clog Zapper on pharyngostomy tube - will use without the stylus as tube likely has multiple holes in it. Discussed with RN about instilling warm water first, and then Clog Zapper if no result. RN will contact day team if still not flushing easily or suctioning well.  - Will get AM CXR early for 2-view of pharyngostomy tube.    - - - - - - - - - - - - - - - - - -  Judit Fang MD  General Surgery PGY-1

## 2017-04-21 NOTE — PROGRESS NOTES
Pharyngostomy tube continues to be non-functional. This RN attempted to flush tube with warm water, pt tolerated poorly with similar regurgitation symptoms. This RN then attempted to flush tube with Clog Zapper mixture, pt tolerated even worse. Tube continues to be clamped, will continue to monitor per plan of care.

## 2017-04-21 NOTE — PROVIDER NOTIFICATION
Dr. Fang; surgery cross-cover; notified of 10-beat run of V-tach seen by tele monitoring. Stat BMP with lytes ordered via verbal. Will continue to monitor; and await results.

## 2017-04-21 NOTE — OP NOTE
DATE OF PROCEDURE:  2017.      SURGEON:  Conchis Marrero MD.  Please note I am only dictating part of this procedure as cosurgeon with Dr. Wise.      PROCEDURE PERFORMED:  Replacement of pharyngostomy tube.      DESCRIPTION OF PROCEDURE:  The pharyngostomy tract had already been established by Dr. Wise and a 16-Greek NG tube was holding the tract open.  I performed a gastroscopy.  A thin adult gastroscope was passed per orally past the anastomosis, which looked intact and viable into the conduit.  A 250 Super Stiff Amplatz wire was then passed through this.  The scope then taken out and an 18 Greek pharyngostomy tube was passed over the wire with laparoscopic visualization and confirmed with palpation through the epigastric incision.  A red rubber pharyngostomy tube was then attached to the NG tube that was already in place and delivered out through the neck incision and sutured in place.  Favian Malone tolerated the procedure well.         CONCHIS MARRERO MD             D: 2017 22:29   T: 2017 08:26   MT:       Name:     FAVIAN MALONE   MRN:      [id number]        Account:        [id number]   :      1945           Procedure Date: 2017      Document: [id number]

## 2017-04-22 ENCOUNTER — APPOINTMENT (OUTPATIENT)
Dept: OCCUPATIONAL THERAPY | Facility: CLINIC | Age: 72
DRG: 326 | End: 2017-04-22
Attending: THORACIC SURGERY (CARDIOTHORACIC VASCULAR SURGERY)
Payer: MEDICARE

## 2017-04-22 ENCOUNTER — APPOINTMENT (OUTPATIENT)
Dept: GENERAL RADIOLOGY | Facility: CLINIC | Age: 72
DRG: 326 | End: 2017-04-22
Attending: THORACIC SURGERY (CARDIOTHORACIC VASCULAR SURGERY)
Payer: MEDICARE

## 2017-04-22 ENCOUNTER — APPOINTMENT (OUTPATIENT)
Dept: PHYSICAL THERAPY | Facility: CLINIC | Age: 72
DRG: 326 | End: 2017-04-22
Attending: THORACIC SURGERY (CARDIOTHORACIC VASCULAR SURGERY)
Payer: MEDICARE

## 2017-04-22 LAB
ALBUMIN UR-MCNC: 10 MG/DL
ANION GAP SERPL CALCULATED.3IONS-SCNC: 9 MMOL/L (ref 3–14)
APPEARANCE UR: CLEAR
BILIRUB UR QL STRIP: NEGATIVE
BUN SERPL-MCNC: 6 MG/DL (ref 7–30)
C DIFF TOX B STL QL: NORMAL
CALCIUM SERPL-MCNC: 7.4 MG/DL (ref 8.5–10.1)
CHLORIDE SERPL-SCNC: 105 MMOL/L (ref 94–109)
CO2 SERPL-SCNC: 22 MMOL/L (ref 20–32)
COLOR UR AUTO: YELLOW
CREAT SERPL-MCNC: 0.4 MG/DL (ref 0.52–1.04)
ERYTHROCYTE [DISTWIDTH] IN BLOOD BY AUTOMATED COUNT: 16.4 % (ref 10–15)
GFR SERPL CREATININE-BSD FRML MDRD: ABNORMAL ML/MIN/1.7M2
GLUCOSE BLDC GLUCOMTR-MCNC: 105 MG/DL (ref 70–99)
GLUCOSE BLDC GLUCOMTR-MCNC: 114 MG/DL (ref 70–99)
GLUCOSE BLDC GLUCOMTR-MCNC: 144 MG/DL (ref 70–99)
GLUCOSE BLDC GLUCOMTR-MCNC: 74 MG/DL (ref 70–99)
GLUCOSE BLDC GLUCOMTR-MCNC: 74 MG/DL (ref 70–99)
GLUCOSE BLDC GLUCOMTR-MCNC: 80 MG/DL (ref 70–99)
GLUCOSE BLDC GLUCOMTR-MCNC: 81 MG/DL (ref 70–99)
GLUCOSE BLDC GLUCOMTR-MCNC: 88 MG/DL (ref 70–99)
GLUCOSE BLDC GLUCOMTR-MCNC: 94 MG/DL (ref 70–99)
GLUCOSE SERPL-MCNC: 134 MG/DL (ref 70–99)
GLUCOSE UR STRIP-MCNC: NEGATIVE MG/DL
HCT VFR BLD AUTO: 24.1 % (ref 35–47)
HGB BLD-MCNC: 7.8 G/DL (ref 11.7–15.7)
HGB UR QL STRIP: NEGATIVE
KETONES UR STRIP-MCNC: NEGATIVE MG/DL
LACTATE BLD-SCNC: 0.9 MMOL/L (ref 0.7–2.1)
LEUKOCYTE ESTERASE UR QL STRIP: NEGATIVE
MAGNESIUM SERPL-MCNC: 2.1 MG/DL (ref 1.6–2.3)
MCH RBC QN AUTO: 27.5 PG (ref 26.5–33)
MCHC RBC AUTO-ENTMCNC: 32.4 G/DL (ref 31.5–36.5)
MCV RBC AUTO: 85 FL (ref 78–100)
MUCOUS THREADS #/AREA URNS LPF: PRESENT /LPF
NITRATE UR QL: NEGATIVE
PH UR STRIP: 7 PH (ref 5–7)
PHOSPHATE SERPL-MCNC: 2.9 MG/DL (ref 2.5–4.5)
PLATELET # BLD AUTO: 256 10E9/L (ref 150–450)
POTASSIUM SERPL-SCNC: 4 MMOL/L (ref 3.4–5.3)
RBC # BLD AUTO: 2.84 10E12/L (ref 3.8–5.2)
RBC #/AREA URNS AUTO: 1 /HPF (ref 0–2)
SODIUM SERPL-SCNC: 137 MMOL/L (ref 133–144)
SP GR UR STRIP: 1.02 (ref 1–1.03)
SPECIMEN SOURCE: NORMAL
TRANS CELLS #/AREA URNS HPF: <1 /HPF (ref 0–1)
URN SPEC COLLECT METH UR: ABNORMAL
UROBILINOGEN UR STRIP-MCNC: NORMAL MG/DL (ref 0–2)
WBC # BLD AUTO: 11.3 10E9/L (ref 4–11)
WBC #/AREA URNS AUTO: 1 /HPF (ref 0–2)

## 2017-04-22 PROCEDURE — 36415 COLL VENOUS BLD VENIPUNCTURE: CPT | Performed by: SURGERY

## 2017-04-22 PROCEDURE — 94640 AIRWAY INHALATION TREATMENT: CPT | Mod: 76

## 2017-04-22 PROCEDURE — 25000132 ZZH RX MED GY IP 250 OP 250 PS 637: Mod: GY | Performed by: SURGERY

## 2017-04-22 PROCEDURE — 25000128 H RX IP 250 OP 636: Performed by: STUDENT IN AN ORGANIZED HEALTH CARE EDUCATION/TRAINING PROGRAM

## 2017-04-22 PROCEDURE — A9270 NON-COVERED ITEM OR SERVICE: HCPCS | Mod: GY | Performed by: STUDENT IN AN ORGANIZED HEALTH CARE EDUCATION/TRAINING PROGRAM

## 2017-04-22 PROCEDURE — 25000125 ZZHC RX 250: Performed by: SURGERY

## 2017-04-22 PROCEDURE — 12000006 ZZH R&B IMCU INTERMEDIATE UMMC

## 2017-04-22 PROCEDURE — 27210437 ZZH NUTRITION PRODUCT SEMIELEM INTERMED LITER

## 2017-04-22 PROCEDURE — 71010 XR CHEST PORT 1 VW: CPT

## 2017-04-22 PROCEDURE — 40000275 ZZH STATISTIC RCP TIME EA 10 MIN

## 2017-04-22 PROCEDURE — 25000132 ZZH RX MED GY IP 250 OP 250 PS 637: Mod: GY | Performed by: PHYSICIAN ASSISTANT

## 2017-04-22 PROCEDURE — 87493 C DIFF AMPLIFIED PROBE: CPT | Performed by: SURGERY

## 2017-04-22 PROCEDURE — 97535 SELF CARE MNGMENT TRAINING: CPT | Mod: GO | Performed by: OCCUPATIONAL THERAPIST

## 2017-04-22 PROCEDURE — 00000146 ZZHCL STATISTIC GLUCOSE BY METER IP

## 2017-04-22 PROCEDURE — 25000132 ZZH RX MED GY IP 250 OP 250 PS 637: Mod: GY | Performed by: NURSE PRACTITIONER

## 2017-04-22 PROCEDURE — 94640 AIRWAY INHALATION TREATMENT: CPT

## 2017-04-22 PROCEDURE — A9270 NON-COVERED ITEM OR SERVICE: HCPCS | Mod: GY | Performed by: NURSE PRACTITIONER

## 2017-04-22 PROCEDURE — 25000132 ZZH RX MED GY IP 250 OP 250 PS 637: Mod: GY | Performed by: STUDENT IN AN ORGANIZED HEALTH CARE EDUCATION/TRAINING PROGRAM

## 2017-04-22 PROCEDURE — 80048 BASIC METABOLIC PNL TOTAL CA: CPT | Performed by: STUDENT IN AN ORGANIZED HEALTH CARE EDUCATION/TRAINING PROGRAM

## 2017-04-22 PROCEDURE — 81001 URINALYSIS AUTO W/SCOPE: CPT | Performed by: SURGERY

## 2017-04-22 PROCEDURE — 40000193 ZZH STATISTIC PT WARD VISIT

## 2017-04-22 PROCEDURE — A9270 NON-COVERED ITEM OR SERVICE: HCPCS | Mod: GY | Performed by: SURGERY

## 2017-04-22 PROCEDURE — 83735 ASSAY OF MAGNESIUM: CPT | Performed by: STUDENT IN AN ORGANIZED HEALTH CARE EDUCATION/TRAINING PROGRAM

## 2017-04-22 PROCEDURE — 97110 THERAPEUTIC EXERCISES: CPT | Mod: GP

## 2017-04-22 PROCEDURE — A9270 NON-COVERED ITEM OR SERVICE: HCPCS | Mod: GY | Performed by: PHYSICIAN ASSISTANT

## 2017-04-22 PROCEDURE — 36592 COLLECT BLOOD FROM PICC: CPT | Performed by: SURGERY

## 2017-04-22 PROCEDURE — 85027 COMPLETE CBC AUTOMATED: CPT | Performed by: STUDENT IN AN ORGANIZED HEALTH CARE EDUCATION/TRAINING PROGRAM

## 2017-04-22 PROCEDURE — 84100 ASSAY OF PHOSPHORUS: CPT | Performed by: STUDENT IN AN ORGANIZED HEALTH CARE EDUCATION/TRAINING PROGRAM

## 2017-04-22 PROCEDURE — 87040 BLOOD CULTURE FOR BACTERIA: CPT | Performed by: SURGERY

## 2017-04-22 PROCEDURE — 25000131 ZZH RX MED GY IP 250 OP 636 PS 637: Mod: GY | Performed by: STUDENT IN AN ORGANIZED HEALTH CARE EDUCATION/TRAINING PROGRAM

## 2017-04-22 PROCEDURE — 40000133 ZZH STATISTIC OT WARD VISIT: Performed by: OCCUPATIONAL THERAPIST

## 2017-04-22 PROCEDURE — 97530 THERAPEUTIC ACTIVITIES: CPT | Mod: GP

## 2017-04-22 PROCEDURE — 36592 COLLECT BLOOD FROM PICC: CPT | Performed by: STUDENT IN AN ORGANIZED HEALTH CARE EDUCATION/TRAINING PROGRAM

## 2017-04-22 PROCEDURE — 83605 ASSAY OF LACTIC ACID: CPT | Performed by: SURGERY

## 2017-04-22 PROCEDURE — 25000125 ZZHC RX 250: Performed by: PHYSICIAN ASSISTANT

## 2017-04-22 RX ADMIN — ANTISEPTIC SURGICAL SCRUB: 0.04 SOLUTION TOPICAL at 20:32

## 2017-04-22 RX ADMIN — POTASSIUM CHLORIDE 10 MEQ: 14.9 INJECTION, SOLUTION, CONCENTRATE PARENTERAL at 00:30

## 2017-04-22 RX ADMIN — GABAPENTIN 200 MG: 250 SUSPENSION ORAL at 06:10

## 2017-04-22 RX ADMIN — HEPARIN SODIUM 5000 UNITS: 5000 INJECTION, SOLUTION INTRAVENOUS; SUBCUTANEOUS at 16:19

## 2017-04-22 RX ADMIN — POTASSIUM CHLORIDE 10 MEQ: 14.9 INJECTION, SOLUTION, CONCENTRATE PARENTERAL at 02:54

## 2017-04-22 RX ADMIN — OXYCODONE HYDROCHLORIDE 10 MG: 5 SOLUTION ORAL at 16:19

## 2017-04-22 RX ADMIN — LEVALBUTEROL HYDROCHLORIDE 0.63 MG: 0.63 SOLUTION RESPIRATORY (INHALATION) at 20:42

## 2017-04-22 RX ADMIN — HYDROMORPHONE HYDROCHLORIDE: 10 INJECTION, SOLUTION INTRAMUSCULAR; INTRAVENOUS; SUBCUTANEOUS at 13:44

## 2017-04-22 RX ADMIN — HEPARIN SODIUM 5000 UNITS: 5000 INJECTION, SOLUTION INTRAVENOUS; SUBCUTANEOUS at 09:16

## 2017-04-22 RX ADMIN — DOXEPIN HYDROCHLORIDE 3 MG: 10 SOLUTION ORAL at 22:11

## 2017-04-22 RX ADMIN — Medication 6 MG: at 09:07

## 2017-04-22 RX ADMIN — CHLORHEXIDINE GLUCONATE 15 ML: 1.2 RINSE ORAL at 09:15

## 2017-04-22 RX ADMIN — OXYCODONE HYDROCHLORIDE 10 MG: 5 SOLUTION ORAL at 04:47

## 2017-04-22 RX ADMIN — SODIUM CHLORIDE SOLN NEBU 3% 3 ML: 3 NEBU SOLN at 20:45

## 2017-04-22 RX ADMIN — SODIUM CHLORIDE SOLN NEBU 3% 3 ML: 3 NEBU SOLN at 17:11

## 2017-04-22 RX ADMIN — METOPROLOL TARTRATE 12.5 MG: 100 TABLET ORAL at 20:45

## 2017-04-22 RX ADMIN — PANTOPRAZOLE SODIUM 40 MG: 40 TABLET, DELAYED RELEASE ORAL at 08:57

## 2017-04-22 RX ADMIN — MULTIVITAMIN 15 ML: LIQUID ORAL at 08:56

## 2017-04-22 RX ADMIN — POTASSIUM CHLORIDE 10 MEQ: 14.9 INJECTION, SOLUTION, CONCENTRATE PARENTERAL at 04:34

## 2017-04-22 RX ADMIN — OXYCODONE HYDROCHLORIDE 10 MG: 5 SOLUTION ORAL at 12:07

## 2017-04-22 RX ADMIN — LEVALBUTEROL HYDROCHLORIDE 0.63 MG: 0.63 SOLUTION RESPIRATORY (INHALATION) at 17:11

## 2017-04-22 RX ADMIN — LIDOCAINE 1 PATCH: 50 PATCH TOPICAL at 11:11

## 2017-04-22 RX ADMIN — Medication 6 MG: at 02:54

## 2017-04-22 RX ADMIN — Medication 6 MG: at 20:20

## 2017-04-22 RX ADMIN — ACETAMINOPHEN 975 MG: 325 SOLUTION ORAL at 04:47

## 2017-04-22 RX ADMIN — METHOCARBAMOL 500 MG: 500 TABLET ORAL at 00:27

## 2017-04-22 RX ADMIN — ACETAMINOPHEN 975 MG: 325 SOLUTION ORAL at 20:19

## 2017-04-22 RX ADMIN — METHOCARBAMOL 500 MG: 500 TABLET ORAL at 18:18

## 2017-04-22 RX ADMIN — HYDROMORPHONE HYDROCHLORIDE: 10 INJECTION, SOLUTION INTRAMUSCULAR; INTRAVENOUS; SUBCUTANEOUS at 22:11

## 2017-04-22 RX ADMIN — HYDROMORPHONE HYDROCHLORIDE: 10 INJECTION, SOLUTION INTRAMUSCULAR; INTRAVENOUS; SUBCUTANEOUS at 06:11

## 2017-04-22 RX ADMIN — OXYCODONE HYDROCHLORIDE 10 MG: 5 SOLUTION ORAL at 20:19

## 2017-04-22 RX ADMIN — ACETAMINOPHEN 975 MG: 325 SOLUTION ORAL at 12:07

## 2017-04-22 RX ADMIN — ANTISEPTIC SURGICAL SCRUB: 0.04 SOLUTION TOPICAL at 12:25

## 2017-04-22 RX ADMIN — CHLORHEXIDINE GLUCONATE 15 ML: 1.2 RINSE ORAL at 20:21

## 2017-04-22 RX ADMIN — METHOCARBAMOL 500 MG: 500 TABLET ORAL at 12:08

## 2017-04-22 RX ADMIN — OXYCODONE HYDROCHLORIDE 10 MG: 5 SOLUTION ORAL at 00:27

## 2017-04-22 RX ADMIN — METOPROLOL TARTRATE 12.5 MG: 100 TABLET ORAL at 08:57

## 2017-04-22 RX ADMIN — POTASSIUM CHLORIDE 20 MEQ: 29.8 INJECTION, SOLUTION INTRAVENOUS at 14:49

## 2017-04-22 RX ADMIN — CHLORASEPTIC 1 ML: 1.5 LIQUID ORAL at 22:16

## 2017-04-22 RX ADMIN — OXYCODONE HYDROCHLORIDE 10 MG: 5 SOLUTION ORAL at 08:57

## 2017-04-22 RX ADMIN — TRAZODONE HYDROCHLORIDE 25 MG: 150 TABLET ORAL at 22:11

## 2017-04-22 RX ADMIN — POTASSIUM CHLORIDE 10 MEQ: 14.9 INJECTION, SOLUTION, CONCENTRATE PARENTERAL at 01:44

## 2017-04-22 RX ADMIN — GABAPENTIN 200 MG: 250 SUSPENSION ORAL at 13:42

## 2017-04-22 RX ADMIN — METHOCARBAMOL 500 MG: 500 TABLET ORAL at 06:10

## 2017-04-22 RX ADMIN — Medication 6 MG: at 13:42

## 2017-04-22 RX ADMIN — GABAPENTIN 200 MG: 250 SUSPENSION ORAL at 22:11

## 2017-04-22 RX ADMIN — INSULIN ASPART 1 UNITS: 100 INJECTION, SOLUTION INTRAVENOUS; SUBCUTANEOUS at 00:38

## 2017-04-22 RX ADMIN — OXYCODONE HYDROCHLORIDE 5 MG: 5 SOLUTION ORAL at 18:18

## 2017-04-22 ASSESSMENT — PAIN DESCRIPTION - DESCRIPTORS
DESCRIPTORS: CONSTANT
DESCRIPTORS: SORE
DESCRIPTORS: SORE
DESCRIPTORS: ACHING;SORE
DESCRIPTORS: SORE

## 2017-04-22 NOTE — PLAN OF CARE
Problem: Goal Outcome Summary  Goal: Goal Outcome Summary  PT 6B: pt did not tolerate session very well. Pain and weakness limiting. Recommend TCU at discharge.

## 2017-04-22 NOTE — PROGRESS NOTES
Thoracic Surgery Progress Note  Minerva Blanco  [id number]    Subjective  Fever overnight.  Pharyngostomy replaced yesterday.    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      Neuro: PCA, c/w scheduled and PRN oxy; tylenol, doxepin, gabapentin, robaxin  Resp: PRN NC, CT to water seal  Cardio: Metoprolol BID  GI/FEN: NPO; restart feeds at 10  Renal: Cr stable  Endo: SSI  Heme/ID: Hgb stable, panculture, will discuss abx  PPx: SQH, no full anticoagulation yet (hx afib), PPI    Seen and discussed with fellow, Dr. Anderson.    Alvin Dee MD  General Surgery PGY-3  [phone]

## 2017-04-22 NOTE — PLAN OF CARE
Problem: Goal Outcome Summary  Goal: Goal Outcome Summary  Outcome: No Change     D/I: Pt A/O, VSS, remains on 3L NC, lungs coarse/crackles in base.  Capnography to continue for 24hrs post procedure, to d/c this evening. Continues to c/o pain in throat, R side of chest (above chest tube), and generalized stomach cramping, gas sensation.  Up on commode, in bathroom or using bedpan for most of the shift.  Very loose, liquid stool sent for c diff, placed in enteric precautions.  Attempting to collect urine sample for UA portion of pan cx collected today; difficult to contain just urine.  Continues on D5NS w/K at 100cc/hr, recheck K of 4.0 this morning. BGs 80, 81. Will restart TF when available from dietary at 10cc/hr, no titration without orders. Pharyngostomy tube remains to LIS, irrigated per orders.  NPO except swabs. Chest tube with 0 output this shift, on water seal.     A/P: Pt with some relief of pain from available scheduled and prn meds, but frequent discomfort r/t ongoing loose stools.  Unable to ambulate in [place] r/t stooling.  C diff pending. Continue to monitor VSS and output, especially K with high diarrhea output.  was present this morning, supportive.

## 2017-04-22 NOTE — PROVIDER NOTIFICATION
Lactic returned elevated at 2.5 and BPs soft 90s/50s. Orders for 500cc LR bolus; cycled BPs [id number]. Will continue to monitor and notify Dr. Fang if BPs continue to remain soft.

## 2017-04-22 NOTE — PLAN OF CARE
Problem: Goal Outcome Summary  Goal: Goal Outcome Summary  Outcome: No Change  /71 (BP Location: Right arm)  Pulse 94  Temp 97.7  F (36.5  C) (Oral)  Resp 18  Ht 1.524 m (5')  Wt 47.2 kg (104 lb 1.6 oz)  SpO2 100%  BMI 20.33 kg/m2  NEURO: Pt's lethargic but Ox.4  CV: AVSS, HR in the 90's and in SR.   PULM: Maintaining sat's in the mid 90's while on 2L NC. Lung creakily coarse. Pt is on capnography monitoring with ETC02 of 35 and IPI around 9 all night.   GI: Pt continue to have diarrhea with no c/o nausea. Pt is npo.  : Pt contuine to void mix with stool. Pt continue to have frequent urgency.   SKIN: pale skin color with multiple surgical site. Chest and abd incision intact and ROX.    LABS: BG check q4h. BG low as 74, apple juice given vai J-tube and BG now in the 100's.  K+ of 3.2 was replaced and recheck would be with AM labs.   LDA: Pharyngostomy tube to LISx with q4h 30cc flushes. J-tube intact and clumped off. Meds given via J-tube. Right chest tube intact and to water seal with no extra output.    PAIN: Dilaudid pca with dose of 0.3 q10min, q4h Tylenol and oxycodone given as schedule.   GTT: MIVF infusing at 100ml/h.   ACTIVITY: Pt is up to beside commode with SBA.   PLAN: Keep monitoring pt as ordered and notifty MD with any new changes

## 2017-04-22 NOTE — PROVIDER NOTIFICATION
Dr. Judit Fang notified of new fever onset (102.5); increased tachycardia high 120s. Notified Dr. Fang that pt's scheduled tylenol was held earlier; and scheduled metoprolol has not be given yet. Gave both medications with equal reduction in numbers. Orders for CBC; lactic; and re-check temp post-tylenol. Will continue to monitor and notify MD of any changes.

## 2017-04-22 NOTE — OP NOTE
DATE OF PROCEDURE:  2017      PREOPERATIVE DIAGNOSES:   1.  Status post esophagectomy.   2.  Nonfunctioning pharyngostomy tube.      PROCEDURES PERFORMED:     1.  Esophagogastroscopy with replacement of pharyngostomy tube.   2.  Fluoroscopy with interpretation of images.      SURGEON:  Nirali Wise MD (present and participated in the entire procedure).      BRIEF HISTORY OF PRESENT ILLNESS:  Ms. Minerva Blanco underwent a retrosternal esophageal reconstruction with a gastric pull-up.  She, in the last 24 hours, had problems with her pharyngostomy tube not draining well and had several bouts of emesis.  For this reason, we decided to take her back to the operating room and replaced the pharyngostomy tube with a different one.      DESCRIPTION OF PROCEDURE:  With the patient in supine position under general anesthesia, we performed an esophagogastroduodenoscopy.  The anastomosis appeared intact and both the esophageal and gastric tissue at the anastomosis appeared very healthy.  We then pulled the pharyngostomy out and passed a wire under fluoroscopic guidance through the gastroscope.  We then replaced the pharyngostomy with an 18-Chadian NG tube that we placed over the wire and verified fluoroscopically that the tip of the wire was above the level of the diaphragm.  We then secured it.  The patient tolerated procedure well.         NIRALI WISE MD             D: 2017 17:45   T: 2017 01:49   MT: TD      Name:     MINERVA BLANCO   MRN:      [id number]        Account:        [id number]   :      1945           Procedure Date: 2017      Document: [id number]       cc: [facility] Surgery Billing

## 2017-04-22 NOTE — PLAN OF CARE
Problem: Goal Outcome Summary  Goal: Goal Outcome Summary  OT 6B: Pt with diarrhea issues this date requiring multiple toilet use. Pt stood at sink X15 minutes w/SBA for grooming tasks. Pt required CGA for functional ambulation w/FWW and assist with line management throughout session. Pt was limited by decreased activity tolerance, post surgical pain and pain in abdomen d/t diarrhea. Recommend discharge to home with assist and home OT/PT when medically stable.

## 2017-04-22 NOTE — PROGRESS NOTES
[facility], [place]    Sepsis Evaluation Progress Note    Date of Service: 04/21/2017    I was called to see Minerva Blanco due to abnormal vital signs triggering the Sepsis SIRS screening alert. She is not known to have an infection.     She is POD#4 from retrosternal pullup after prior leak after Touplet fundoplication and POD#0 from pharyngostomy tube exchange. She became febrile to 102.5 and tachycardic to the 120s, BP stable, UOPA. She previously had not been febrile postop, HR has been in the 80s-90s postop. She is beta blocked for a h/o afib and the RN has not yet given her evening metoprolol.     She reports feeling a little poorly and flushed/feverish, but otherwise unchanged. Pain is well controlled. No n/v, CP, SOB, palpitations.    Physical Exam    Vital Signs:  Temp:  [97.3  F (36.3  C)-102.5  F (39.2  C)] 98.4  F (36.9  C)  Pulse:  [95] 95  Heart Rate:  [] 128  Resp:  [16-22] 20  BP: ()/(51-88) 140/77  SpO2:  [92 %-100 %] 92 %    The patient is at baseline mental status.    General: AAO, NAD, lying in bed, appears comfortable  HEENT: pharyngostomy tube (NG tubing) suctioning well, prior spit fistula site external dressing removed and no surrounding erythema or obvious purulent drainage, CT to water seal with no air leak appreciated s/s output  CV: tachycardic, regular rhythm, wwp  Pulm: breathing comfortably on 2L NC  Abd: soft, appropriately tender to palpation, non-distended, incisions c/d/i, J tube clamped    Lab:  Lactic Acid   Date Value Ref Range Status   04/21/2017 2.5 (H) 0.7 - 2.1 mmol/L Final           Assessment and Plan    The SIRS and exam findings are likely due to dehydration and/or normal postoperative changes, there is no sign of sepsis at this time.    - EKG: sinus tachycardia  - Give beta blocker as scheduled.  - Tylenol for fever  - 500ml LR  - CBC to eval for new leukocytosis given pt is POD#4, but no full fever workup given pt also POD#0  and fever may be due to atelectasis.  - Repeat lactate at 02:00.    Disposition: The patient will remain on the current unit. We will continue to monitor this patient closely.    Judit Fang MD      ADDENDUM 2:00 AM:  Repeat lactate 0.9. Pt's HR normalized to 80s-90s. BP also became soft, likely due to effects of metoprolol (BP 71/53 charted but repeat a few minutes after was  so likely incorrect reading). UOP still adequate. Will continue to monitor BP and HR.    - - - - - - - - - - - - - - - - - -  Judit Fang MD  General Surgery PGY-1

## 2017-04-23 ENCOUNTER — APPOINTMENT (OUTPATIENT)
Dept: GENERAL RADIOLOGY | Facility: CLINIC | Age: 72
DRG: 326 | End: 2017-04-23
Attending: STUDENT IN AN ORGANIZED HEALTH CARE EDUCATION/TRAINING PROGRAM
Payer: MEDICARE

## 2017-04-23 ENCOUNTER — APPOINTMENT (OUTPATIENT)
Dept: OCCUPATIONAL THERAPY | Facility: CLINIC | Age: 72
DRG: 326 | End: 2017-04-23
Attending: THORACIC SURGERY (CARDIOTHORACIC VASCULAR SURGERY)
Payer: MEDICARE

## 2017-04-23 LAB
ANION GAP SERPL CALCULATED.3IONS-SCNC: 8 MMOL/L (ref 3–14)
BUN SERPL-MCNC: 5 MG/DL (ref 7–30)
CALCIUM SERPL-MCNC: 7.7 MG/DL (ref 8.5–10.1)
CHLORIDE SERPL-SCNC: 106 MMOL/L (ref 94–109)
CO2 SERPL-SCNC: 21 MMOL/L (ref 20–32)
CREAT SERPL-MCNC: 0.31 MG/DL (ref 0.52–1.04)
ERYTHROCYTE [DISTWIDTH] IN BLOOD BY AUTOMATED COUNT: 16.9 % (ref 10–15)
GFR SERPL CREATININE-BSD FRML MDRD: ABNORMAL ML/MIN/1.7M2
GLUCOSE BLDC GLUCOMTR-MCNC: 115 MG/DL (ref 70–99)
GLUCOSE BLDC GLUCOMTR-MCNC: 82 MG/DL (ref 70–99)
GLUCOSE BLDC GLUCOMTR-MCNC: 82 MG/DL (ref 70–99)
GLUCOSE BLDC GLUCOMTR-MCNC: 83 MG/DL (ref 70–99)
GLUCOSE BLDC GLUCOMTR-MCNC: 86 MG/DL (ref 70–99)
GLUCOSE BLDC GLUCOMTR-MCNC: 89 MG/DL (ref 70–99)
GLUCOSE SERPL-MCNC: 98 MG/DL (ref 70–99)
HCT VFR BLD AUTO: 24.7 % (ref 35–47)
HGB BLD-MCNC: 8 G/DL (ref 11.7–15.7)
LACTATE BLD-SCNC: 1.5 MMOL/L (ref 0.7–2.1)
MAGNESIUM SERPL-MCNC: 1.9 MG/DL (ref 1.6–2.3)
MCH RBC QN AUTO: 27.6 PG (ref 26.5–33)
MCHC RBC AUTO-ENTMCNC: 32.4 G/DL (ref 31.5–36.5)
MCV RBC AUTO: 85 FL (ref 78–100)
PHOSPHATE SERPL-MCNC: 2.2 MG/DL (ref 2.5–4.5)
PLATELET # BLD AUTO: 306 10E9/L (ref 150–450)
POTASSIUM SERPL-SCNC: 4 MMOL/L (ref 3.4–5.3)
RBC # BLD AUTO: 2.9 10E12/L (ref 3.8–5.2)
SODIUM SERPL-SCNC: 135 MMOL/L (ref 133–144)
WBC # BLD AUTO: 14.7 10E9/L (ref 4–11)

## 2017-04-23 PROCEDURE — 94640 AIRWAY INHALATION TREATMENT: CPT

## 2017-04-23 PROCEDURE — 25000125 ZZHC RX 250: Performed by: PHYSICIAN ASSISTANT

## 2017-04-23 PROCEDURE — A9270 NON-COVERED ITEM OR SERVICE: HCPCS | Mod: GY | Performed by: PHYSICIAN ASSISTANT

## 2017-04-23 PROCEDURE — 80048 BASIC METABOLIC PNL TOTAL CA: CPT | Performed by: STUDENT IN AN ORGANIZED HEALTH CARE EDUCATION/TRAINING PROGRAM

## 2017-04-23 PROCEDURE — 25000128 H RX IP 250 OP 636: Performed by: STUDENT IN AN ORGANIZED HEALTH CARE EDUCATION/TRAINING PROGRAM

## 2017-04-23 PROCEDURE — 25000132 ZZH RX MED GY IP 250 OP 250 PS 637: Mod: GY | Performed by: PHYSICIAN ASSISTANT

## 2017-04-23 PROCEDURE — 25000132 ZZH RX MED GY IP 250 OP 250 PS 637: Mod: GY | Performed by: SURGERY

## 2017-04-23 PROCEDURE — 40000556 ZZH STATISTIC PERIPHERAL IV START W US GUIDANCE

## 2017-04-23 PROCEDURE — 25000132 ZZH RX MED GY IP 250 OP 250 PS 637: Mod: GY | Performed by: NURSE PRACTITIONER

## 2017-04-23 PROCEDURE — 97530 THERAPEUTIC ACTIVITIES: CPT | Mod: GO | Performed by: OCCUPATIONAL THERAPIST

## 2017-04-23 PROCEDURE — 36592 COLLECT BLOOD FROM PICC: CPT | Performed by: SURGERY

## 2017-04-23 PROCEDURE — A9270 NON-COVERED ITEM OR SERVICE: HCPCS | Mod: GY | Performed by: SURGERY

## 2017-04-23 PROCEDURE — 25000132 ZZH RX MED GY IP 250 OP 250 PS 637: Mod: GY | Performed by: STUDENT IN AN ORGANIZED HEALTH CARE EDUCATION/TRAINING PROGRAM

## 2017-04-23 PROCEDURE — 36592 COLLECT BLOOD FROM PICC: CPT | Performed by: STUDENT IN AN ORGANIZED HEALTH CARE EDUCATION/TRAINING PROGRAM

## 2017-04-23 PROCEDURE — 87070 CULTURE OTHR SPECIMN AEROBIC: CPT | Performed by: SURGERY

## 2017-04-23 PROCEDURE — 83605 ASSAY OF LACTIC ACID: CPT | Performed by: SURGERY

## 2017-04-23 PROCEDURE — 87075 CULTR BACTERIA EXCEPT BLOOD: CPT | Performed by: SURGERY

## 2017-04-23 PROCEDURE — A9270 NON-COVERED ITEM OR SERVICE: HCPCS | Mod: GY | Performed by: NURSE PRACTITIONER

## 2017-04-23 PROCEDURE — 25000125 ZZHC RX 250: Performed by: SURGERY

## 2017-04-23 PROCEDURE — 25000125 ZZHC RX 250: Performed by: NURSE PRACTITIONER

## 2017-04-23 PROCEDURE — 25000132 ZZH RX MED GY IP 250 OP 250 PS 637: Mod: GY

## 2017-04-23 PROCEDURE — 83735 ASSAY OF MAGNESIUM: CPT | Performed by: STUDENT IN AN ORGANIZED HEALTH CARE EDUCATION/TRAINING PROGRAM

## 2017-04-23 PROCEDURE — 12000006 ZZH R&B IMCU INTERMEDIATE UMMC

## 2017-04-23 PROCEDURE — 40000133 ZZH STATISTIC OT WARD VISIT: Performed by: OCCUPATIONAL THERAPIST

## 2017-04-23 PROCEDURE — 25000128 H RX IP 250 OP 636: Performed by: SURGERY

## 2017-04-23 PROCEDURE — 71020 XR CHEST 2 VW: CPT

## 2017-04-23 PROCEDURE — 25800025 ZZH RX 258: Performed by: STUDENT IN AN ORGANIZED HEALTH CARE EDUCATION/TRAINING PROGRAM

## 2017-04-23 PROCEDURE — 97535 SELF CARE MNGMENT TRAINING: CPT | Mod: GO | Performed by: OCCUPATIONAL THERAPIST

## 2017-04-23 PROCEDURE — 00000146 ZZHCL STATISTIC GLUCOSE BY METER IP

## 2017-04-23 PROCEDURE — 85027 COMPLETE CBC AUTOMATED: CPT | Performed by: STUDENT IN AN ORGANIZED HEALTH CARE EDUCATION/TRAINING PROGRAM

## 2017-04-23 PROCEDURE — 40000275 ZZH STATISTIC RCP TIME EA 10 MIN

## 2017-04-23 PROCEDURE — 27210437 ZZH NUTRITION PRODUCT SEMIELEM INTERMED LITER

## 2017-04-23 PROCEDURE — A9270 NON-COVERED ITEM OR SERVICE: HCPCS | Mod: GY | Performed by: STUDENT IN AN ORGANIZED HEALTH CARE EDUCATION/TRAINING PROGRAM

## 2017-04-23 PROCEDURE — 84100 ASSAY OF PHOSPHORUS: CPT | Performed by: STUDENT IN AN ORGANIZED HEALTH CARE EDUCATION/TRAINING PROGRAM

## 2017-04-23 RX ORDER — LOPERAMIDE HYDROCHLORIDE 1 MG/5ML
2 SOLUTION ORAL 2 TIMES DAILY
Status: DISCONTINUED | OUTPATIENT
Start: 2017-04-23 | End: 2017-04-23

## 2017-04-23 RX ORDER — LOPERAMIDE HYDROCHLORIDE 1 MG/5ML
2 SOLUTION ORAL 2 TIMES DAILY
Status: DISCONTINUED | OUTPATIENT
Start: 2017-04-23 | End: 2017-04-28 | Stop reason: HOSPADM

## 2017-04-23 RX ORDER — LOPERAMIDE HYDROCHLORIDE 1 MG/5ML
2 SOLUTION ORAL 4 TIMES DAILY PRN
Status: DISCONTINUED | OUTPATIENT
Start: 2017-04-23 | End: 2017-04-28 | Stop reason: HOSPADM

## 2017-04-23 RX ORDER — SODIUM CHLORIDE FOR INHALATION 3 %
3 VIAL, NEBULIZER (ML) INHALATION
Status: DISCONTINUED | OUTPATIENT
Start: 2017-04-23 | End: 2017-04-25

## 2017-04-23 RX ORDER — LOPERAMIDE HYDROCHLORIDE 1 MG/5ML
2 SOLUTION ORAL 2 TIMES DAILY PRN
Status: DISCONTINUED | OUTPATIENT
Start: 2017-04-23 | End: 2017-04-23

## 2017-04-23 RX ADMIN — HEPARIN SODIUM 5000 UNITS: 5000 INJECTION, SOLUTION INTRAVENOUS; SUBCUTANEOUS at 08:51

## 2017-04-23 RX ADMIN — GABAPENTIN 200 MG: 250 SUSPENSION ORAL at 14:01

## 2017-04-23 RX ADMIN — POTASSIUM CHLORIDE 20 MEQ: 29.8 INJECTION, SOLUTION INTRAVENOUS at 14:01

## 2017-04-23 RX ADMIN — OXYCODONE HYDROCHLORIDE 10 MG: 5 SOLUTION ORAL at 01:08

## 2017-04-23 RX ADMIN — PANTOPRAZOLE SODIUM 40 MG: 40 TABLET, DELAYED RELEASE ORAL at 08:51

## 2017-04-23 RX ADMIN — OXYCODONE HYDROCHLORIDE 10 MG: 5 SOLUTION ORAL at 12:08

## 2017-04-23 RX ADMIN — OXYCODONE HYDROCHLORIDE 5 MG: 5 SOLUTION ORAL at 04:52

## 2017-04-23 RX ADMIN — OXYCODONE HYDROCHLORIDE 10 MG: 5 SOLUTION ORAL at 15:57

## 2017-04-23 RX ADMIN — METHOCARBAMOL 500 MG: 500 TABLET ORAL at 05:56

## 2017-04-23 RX ADMIN — METHOCARBAMOL 500 MG: 500 TABLET ORAL at 01:09

## 2017-04-23 RX ADMIN — SODIUM CHLORIDE SOLN NEBU 3% 3 ML: 3 NEBU SOLN at 21:01

## 2017-04-23 RX ADMIN — GABAPENTIN 200 MG: 250 SUSPENSION ORAL at 22:18

## 2017-04-23 RX ADMIN — HEPARIN SODIUM 5000 UNITS: 5000 INJECTION, SOLUTION INTRAVENOUS; SUBCUTANEOUS at 15:57

## 2017-04-23 RX ADMIN — ANTISEPTIC SURGICAL SCRUB: 0.04 SOLUTION TOPICAL at 09:23

## 2017-04-23 RX ADMIN — LOPERAMIDE HYDROCHLORIDE 2 MG: 1 SOLUTION ORAL at 10:44

## 2017-04-23 RX ADMIN — OXYCODONE HYDROCHLORIDE 10 MG: 5 SOLUTION ORAL at 08:50

## 2017-04-23 RX ADMIN — Medication 6 MG: at 20:22

## 2017-04-23 RX ADMIN — METOPROLOL TARTRATE 12.5 MG: 100 TABLET ORAL at 08:51

## 2017-04-23 RX ADMIN — OXYCODONE HYDROCHLORIDE 10 MG: 5 SOLUTION ORAL at 20:22

## 2017-04-23 RX ADMIN — HEPARIN SODIUM 5000 UNITS: 5000 INJECTION, SOLUTION INTRAVENOUS; SUBCUTANEOUS at 01:09

## 2017-04-23 RX ADMIN — MULTIVITAMIN 15 ML: LIQUID ORAL at 08:50

## 2017-04-23 RX ADMIN — Medication 2 G: at 11:11

## 2017-04-23 RX ADMIN — LOPERAMIDE HYDROCHLORIDE 2 MG: 1 SOLUTION ORAL at 04:30

## 2017-04-23 RX ADMIN — Medication 6 MG: at 14:00

## 2017-04-23 RX ADMIN — POTASSIUM PHOSPHATE, MONOBASIC AND POTASSIUM PHOSPHATE, DIBASIC 15 MMOL: 224; 236 INJECTION, SOLUTION INTRAVENOUS at 20:26

## 2017-04-23 RX ADMIN — METOPROLOL TARTRATE 25 MG: 100 TABLET, FILM COATED ORAL at 20:26

## 2017-04-23 RX ADMIN — METHOCARBAMOL 500 MG: 500 TABLET ORAL at 12:09

## 2017-04-23 RX ADMIN — GABAPENTIN 200 MG: 250 SUSPENSION ORAL at 05:56

## 2017-04-23 RX ADMIN — METHOCARBAMOL 500 MG: 500 TABLET ORAL at 18:16

## 2017-04-23 RX ADMIN — ACETAMINOPHEN 975 MG: 325 SOLUTION ORAL at 20:22

## 2017-04-23 RX ADMIN — LIDOCAINE HYDROCHLORIDE 1 ML: 10 INJECTION, SOLUTION INFILTRATION; PERINEURAL at 18:18

## 2017-04-23 RX ADMIN — ANTISEPTIC SURGICAL SCRUB: 0.04 SOLUTION TOPICAL at 20:44

## 2017-04-23 RX ADMIN — CHLORHEXIDINE GLUCONATE 15 ML: 1.2 RINSE ORAL at 08:50

## 2017-04-23 RX ADMIN — HYDROMORPHONE HYDROCHLORIDE: 10 INJECTION, SOLUTION INTRAMUSCULAR; INTRAVENOUS; SUBCUTANEOUS at 05:57

## 2017-04-23 RX ADMIN — HYDROMORPHONE HYDROCHLORIDE: 10 INJECTION, SOLUTION INTRAMUSCULAR; INTRAVENOUS; SUBCUTANEOUS at 17:51

## 2017-04-23 RX ADMIN — LEVALBUTEROL HYDROCHLORIDE 0.63 MG: 0.63 SOLUTION RESPIRATORY (INHALATION) at 21:01

## 2017-04-23 RX ADMIN — DOXEPIN HYDROCHLORIDE 3 MG: 10 SOLUTION ORAL at 22:18

## 2017-04-23 RX ADMIN — LOPERAMIDE HYDROCHLORIDE 2 MG: 1 SOLUTION ORAL at 20:21

## 2017-04-23 RX ADMIN — CHLORHEXIDINE GLUCONATE 15 ML: 1.2 RINSE ORAL at 20:21

## 2017-04-23 RX ADMIN — OXYCODONE HYDROCHLORIDE 10 MG: 5 SOLUTION ORAL at 04:30

## 2017-04-23 RX ADMIN — POTASSIUM CHLORIDE, DEXTROSE MONOHYDRATE AND SODIUM CHLORIDE: 150; 5; 450 INJECTION, SOLUTION INTRAVENOUS at 05:42

## 2017-04-23 RX ADMIN — ACETAMINOPHEN 975 MG: 325 SOLUTION ORAL at 04:29

## 2017-04-23 RX ADMIN — Medication 6 MG: at 01:08

## 2017-04-23 RX ADMIN — ACETAMINOPHEN 975 MG: 325 SOLUTION ORAL at 12:08

## 2017-04-23 RX ADMIN — LIDOCAINE 2 PATCH: 50 PATCH TOPICAL at 10:51

## 2017-04-23 RX ADMIN — Medication 6 MG: at 08:51

## 2017-04-23 ASSESSMENT — PAIN DESCRIPTION - DESCRIPTORS
DESCRIPTORS: CONSTANT

## 2017-04-23 NOTE — PLAN OF CARE
Problem: Goal Outcome Summary  Goal: Goal Outcome Summary  Outcome: No Change  /78 (BP Location: Right arm)  Pulse 110  Temp 97.9  F (36.6  C) (Oral)  Resp 16  Ht 1.524 m (5')  Wt 48.9 kg (107 lb 12.9 oz)  SpO2 96%  BMI 21.05 kg/m2     Neuro: A&OX4. Neuro intact.   Cardiac: Sinus rhythm sustains 's. 's.  Resp: sats maintained >96% on RA  GI/: Abdominal cramping, imodium x1 given. Frequent loose, watery stools, occasionally incontinent. Voids adequately.  Diet/Appetite: NPO, continuous tube feedings through j tube at 10mL/hr.  Skin: Tenderness around J tube, multiple incisions stapled open to air. Right chest tube, Left pharyngostomy.   Access: Left double lumen PICC infusing TKO and Dextrose 1/2 NS with K+ at 100mL/hr.  Drains:Left pharyngostomy, flushed with 30mL water q4hr. Right chest tube (with minimal output.)  Activity: up with assist of 1 to bed side commode.   Pain: in left side of neck from pharyngostomy tube. Scheduled pain meds given, PCA used, and PRN oxycodone given x1.      Jus log held x2 for BG not meeting parameters.      Will continue with plan of care and notify MD of any changes.

## 2017-04-23 NOTE — PLAN OF CARE
Problem: Goal Outcome Summary  Goal: Goal Outcome Summary  Outcome: No Change  Pt is alert and oriented x4; BPs stable; NSR - sinus tachycardia 110s; afebrile; titrated to room air; sats >96%. Capno D'Cd. C/o incisional pain and some tenderness at J-tube site; continuing PCA at 0.3mg [id number] with scheduled pain meds; and intermittent prns. Pharyngostomy to LIS; 30cc flush q4h; pushing slow and tolerating well. Crackles noted bibasilar. R CT to waterseal; ARTURO output due to prior oasis knocking over; dressing C/D/I. J-tube with TF at 10/hr; tolerating well. Ambulated x1 with  at side; up in chair 2 hrs; significant pain after sitting in the chair. Will continue to monitor.

## 2017-04-23 NOTE — PROGRESS NOTES
Thoracic Surgery Progress Note  Minerva Blanco  [id number]    Subjective  Upset about loose stools.  Pain in throat about the same.  No further fevers.    Objective  Temp:  [97.6  F (36.4  C)-98.7  F (37.1  C)] 97.6  F (36.4  C)  Pulse:  [] 110  Heart Rate:  [] 92  Resp:  [14-20] 15  BP: (112-135)/(65-78) 135/74  SpO2:  [94 %-98 %] 96 %    Physical Exam:  Gen: Awake, alert, NAD  HEENT: Pharyngostomy functioning, takedown site c/d/i  CVS: Mild tachcyardia  Resp: NLB on RA, CT with minimal output  Abd: Soft, nt/nd  Extrem: WWP    UOP adequate  Pharyngostomy working 360/240  Significant stool    Cr normal  WBC 14.6, hgb stable    UA negative  C diff negative    CXR without significant infiltrate    Assessment & Plan  72 year old female with history of leak s/p topuet flundoplication, s/p multiple procedures, ultimately esophagectomy, 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      Neuro: PCA, c/w scheduled and PRN oxy; tylenol, doxepin, gabapentin, robaxin  Resp: PRN NC, CT to water seal  Cardio: Metoprolol BID, inc to 25 (home 50)  GI/FEN: NPO; restart feeds at 10  Renal: Cr stable  Endo: SSI  Heme/ID:  WBC mildly up, if febrile or WBC continues to uptrend will pull PICC empirically, infectious workup negative thus far  PPx: SQH, no full anticoagulation yet (hx afib), PPI     Seen and discussed with fellow, Dr. Anderson.    Alvin Dee MD  General Surgery PGY-3  [phone]

## 2017-04-23 NOTE — PLAN OF CARE
Problem: Goal Outcome Summary  Goal: Goal Outcome Summary  OT 6B: Pt CGA for functional ambulation w/FWW. SBA for toilet transfer and toilet hygiene. Pt limited by strength, endurance, post surgical precautions. Pt would benefit from a rehab stay, however, pt is not agreeable to a rehab stay. Recommend discharge to home w/home OT/PT.

## 2017-04-23 NOTE — PLAN OF CARE
Problem: Goal Outcome Summary  Goal: Goal Outcome Summary  PT 6B: CANCEL; pt working with OT upon attempt. Will reschedule.

## 2017-04-23 NOTE — PROGRESS NOTES
Attempted to place PIV with US.  Patient very upset PICC line scheduled to be pulled.  Patient desires PIV in right arm,  Staff nurse [place] busy with patient, unable to get to right side of patient.  Will attempt placement later today.

## 2017-04-23 NOTE — PROGRESS NOTES
Uptrend in WBC. New orders to remove PICC and send tip for cx. Vascular access RN to place PIV this afternoon with US. Triggered sepsis protocol (tachycardia & WBC). LA negative. Increased TF 20 cc/hr. Diarrhea improved this afternoon. Spent most of night and morning on commode stooling. Adequate UOP. Needs CT drsg changed and Phos replaced per protocol. Continue to monitor and notify MD of changes.

## 2017-04-24 ENCOUNTER — APPOINTMENT (OUTPATIENT)
Dept: GENERAL RADIOLOGY | Facility: CLINIC | Age: 72
DRG: 326 | End: 2017-04-24
Attending: THORACIC SURGERY (CARDIOTHORACIC VASCULAR SURGERY)
Payer: MEDICARE

## 2017-04-24 ENCOUNTER — APPOINTMENT (OUTPATIENT)
Dept: SPEECH THERAPY | Facility: CLINIC | Age: 72
DRG: 326 | End: 2017-04-24
Attending: THORACIC SURGERY (CARDIOTHORACIC VASCULAR SURGERY)
Payer: MEDICARE

## 2017-04-24 ENCOUNTER — APPOINTMENT (OUTPATIENT)
Dept: GENERAL RADIOLOGY | Facility: CLINIC | Age: 72
DRG: 326 | End: 2017-04-24
Attending: STUDENT IN AN ORGANIZED HEALTH CARE EDUCATION/TRAINING PROGRAM
Payer: MEDICARE

## 2017-04-24 ENCOUNTER — APPOINTMENT (OUTPATIENT)
Dept: PHYSICAL THERAPY | Facility: CLINIC | Age: 72
DRG: 326 | End: 2017-04-24
Attending: THORACIC SURGERY (CARDIOTHORACIC VASCULAR SURGERY)
Payer: MEDICARE

## 2017-04-24 LAB
ANION GAP SERPL CALCULATED.3IONS-SCNC: 10 MMOL/L (ref 3–14)
BUN SERPL-MCNC: 7 MG/DL (ref 7–30)
CALCIUM SERPL-MCNC: 7.9 MG/DL (ref 8.5–10.1)
CHLORIDE SERPL-SCNC: 105 MMOL/L (ref 94–109)
CO2 SERPL-SCNC: 20 MMOL/L (ref 20–32)
CREAT SERPL-MCNC: 0.35 MG/DL (ref 0.52–1.04)
CRP SERPL-MCNC: 190 MG/L (ref 0–8)
ERYTHROCYTE [DISTWIDTH] IN BLOOD BY AUTOMATED COUNT: 17.3 % (ref 10–15)
GFR SERPL CREATININE-BSD FRML MDRD: ABNORMAL ML/MIN/1.7M2
GLUCOSE BLDC GLUCOMTR-MCNC: 75 MG/DL (ref 70–99)
GLUCOSE BLDC GLUCOMTR-MCNC: 80 MG/DL (ref 70–99)
GLUCOSE BLDC GLUCOMTR-MCNC: 84 MG/DL (ref 70–99)
GLUCOSE BLDC GLUCOMTR-MCNC: 93 MG/DL (ref 70–99)
GLUCOSE BLDC GLUCOMTR-MCNC: 94 MG/DL (ref 70–99)
GLUCOSE BLDC GLUCOMTR-MCNC: 96 MG/DL (ref 70–99)
GLUCOSE SERPL-MCNC: 91 MG/DL (ref 70–99)
HCT VFR BLD AUTO: 25.7 % (ref 35–47)
HGB BLD-MCNC: 8.3 G/DL (ref 11.7–15.7)
MAGNESIUM SERPL-MCNC: 2 MG/DL (ref 1.6–2.3)
MCH RBC QN AUTO: 27.8 PG (ref 26.5–33)
MCHC RBC AUTO-ENTMCNC: 32.3 G/DL (ref 31.5–36.5)
MCV RBC AUTO: 86 FL (ref 78–100)
PHOSPHATE SERPL-MCNC: 4 MG/DL (ref 2.5–4.5)
PLATELET # BLD AUTO: 295 10E9/L (ref 150–450)
POTASSIUM SERPL-SCNC: 3.9 MMOL/L (ref 3.4–5.3)
RBC # BLD AUTO: 2.99 10E12/L (ref 3.8–5.2)
SODIUM SERPL-SCNC: 136 MMOL/L (ref 133–144)
WBC # BLD AUTO: 13.4 10E9/L (ref 4–11)

## 2017-04-24 PROCEDURE — 25000132 ZZH RX MED GY IP 250 OP 250 PS 637: Mod: GY | Performed by: PHYSICIAN ASSISTANT

## 2017-04-24 PROCEDURE — A9270 NON-COVERED ITEM OR SERVICE: HCPCS | Mod: GY | Performed by: SURGERY

## 2017-04-24 PROCEDURE — 25000128 H RX IP 250 OP 636: Performed by: STUDENT IN AN ORGANIZED HEALTH CARE EDUCATION/TRAINING PROGRAM

## 2017-04-24 PROCEDURE — 92611 MOTION FLUOROSCOPY/SWALLOW: CPT | Mod: GN

## 2017-04-24 PROCEDURE — 25000132 ZZH RX MED GY IP 250 OP 250 PS 637: Mod: GY | Performed by: NURSE PRACTITIONER

## 2017-04-24 PROCEDURE — 71020 XR CHEST 2 VW: CPT | Mod: 77

## 2017-04-24 PROCEDURE — 86140 C-REACTIVE PROTEIN: CPT | Performed by: STUDENT IN AN ORGANIZED HEALTH CARE EDUCATION/TRAINING PROGRAM

## 2017-04-24 PROCEDURE — 25000132 ZZH RX MED GY IP 250 OP 250 PS 637: Mod: GY

## 2017-04-24 PROCEDURE — 40000193 ZZH STATISTIC PT WARD VISIT

## 2017-04-24 PROCEDURE — A9270 NON-COVERED ITEM OR SERVICE: HCPCS | Mod: GY | Performed by: NURSE PRACTITIONER

## 2017-04-24 PROCEDURE — 40000275 ZZH STATISTIC RCP TIME EA 10 MIN

## 2017-04-24 PROCEDURE — 00000146 ZZHCL STATISTIC GLUCOSE BY METER IP

## 2017-04-24 PROCEDURE — 94640 AIRWAY INHALATION TREATMENT: CPT

## 2017-04-24 PROCEDURE — 83735 ASSAY OF MAGNESIUM: CPT | Performed by: STUDENT IN AN ORGANIZED HEALTH CARE EDUCATION/TRAINING PROGRAM

## 2017-04-24 PROCEDURE — A9270 NON-COVERED ITEM OR SERVICE: HCPCS | Mod: GY | Performed by: PHYSICIAN ASSISTANT

## 2017-04-24 PROCEDURE — 80048 BASIC METABOLIC PNL TOTAL CA: CPT | Performed by: STUDENT IN AN ORGANIZED HEALTH CARE EDUCATION/TRAINING PROGRAM

## 2017-04-24 PROCEDURE — A9270 NON-COVERED ITEM OR SERVICE: HCPCS | Mod: GY | Performed by: STUDENT IN AN ORGANIZED HEALTH CARE EDUCATION/TRAINING PROGRAM

## 2017-04-24 PROCEDURE — 25000132 ZZH RX MED GY IP 250 OP 250 PS 637: Mod: GY | Performed by: SURGERY

## 2017-04-24 PROCEDURE — 71020 XR CHEST 2 VW: CPT

## 2017-04-24 PROCEDURE — 74230 X-RAY XM SWLNG FUNCJ C+: CPT

## 2017-04-24 PROCEDURE — 12000006 ZZH R&B IMCU INTERMEDIATE UMMC

## 2017-04-24 PROCEDURE — 97110 THERAPEUTIC EXERCISES: CPT | Mod: GP

## 2017-04-24 PROCEDURE — 25000125 ZZHC RX 250: Performed by: PHYSICIAN ASSISTANT

## 2017-04-24 PROCEDURE — 25000132 ZZH RX MED GY IP 250 OP 250 PS 637: Mod: GY | Performed by: STUDENT IN AN ORGANIZED HEALTH CARE EDUCATION/TRAINING PROGRAM

## 2017-04-24 PROCEDURE — 40000225 ZZH STATISTIC SLP WARD VISIT

## 2017-04-24 PROCEDURE — 85027 COMPLETE CBC AUTOMATED: CPT | Performed by: STUDENT IN AN ORGANIZED HEALTH CARE EDUCATION/TRAINING PROGRAM

## 2017-04-24 PROCEDURE — 84100 ASSAY OF PHOSPHORUS: CPT | Performed by: STUDENT IN AN ORGANIZED HEALTH CARE EDUCATION/TRAINING PROGRAM

## 2017-04-24 PROCEDURE — 36415 COLL VENOUS BLD VENIPUNCTURE: CPT | Performed by: STUDENT IN AN ORGANIZED HEALTH CARE EDUCATION/TRAINING PROGRAM

## 2017-04-24 RX ORDER — FUROSEMIDE 10 MG/ML
10 INJECTION INTRAMUSCULAR; INTRAVENOUS ONCE
Status: COMPLETED | OUTPATIENT
Start: 2017-04-24 | End: 2017-04-24

## 2017-04-24 RX ORDER — BARIUM SULFATE 400 MG/ML
SUSPENSION ORAL ONCE
Status: DISCONTINUED | OUTPATIENT
Start: 2017-04-24 | End: 2017-04-24 | Stop reason: CLARIF

## 2017-04-24 RX ORDER — HYOSCYAMINE SULFATE 0.125 MG
125 TABLET ORAL 3 TIMES DAILY
Status: DISCONTINUED | OUTPATIENT
Start: 2017-04-24 | End: 2017-04-28 | Stop reason: HOSPADM

## 2017-04-24 RX ORDER — HYOSCYAMINE SULFATE 0.125 MG
125 TABLET ORAL EVERY 4 HOURS PRN
Status: DISCONTINUED | OUTPATIENT
Start: 2017-04-24 | End: 2017-04-24

## 2017-04-24 RX ADMIN — ACETAMINOPHEN 975 MG: 325 SOLUTION ORAL at 05:13

## 2017-04-24 RX ADMIN — METOPROLOL TARTRATE 25 MG: 100 TABLET, FILM COATED ORAL at 08:49

## 2017-04-24 RX ADMIN — LIDOCAINE 1 PATCH: 50 PATCH TOPICAL at 14:30

## 2017-04-24 RX ADMIN — OXYCODONE HYDROCHLORIDE 10 MG: 5 SOLUTION ORAL at 08:48

## 2017-04-24 RX ADMIN — PANTOPRAZOLE SODIUM 40 MG: 40 TABLET, DELAYED RELEASE ORAL at 08:39

## 2017-04-24 RX ADMIN — HYOSCYAMINE SULFATE 125 MCG: 0.12 TABLET ORAL at 20:29

## 2017-04-24 RX ADMIN — DOXEPIN HYDROCHLORIDE 3 MG: 10 SOLUTION ORAL at 22:07

## 2017-04-24 RX ADMIN — CHLORHEXIDINE GLUCONATE 15 ML: 1.2 RINSE ORAL at 20:28

## 2017-04-24 RX ADMIN — HEPARIN SODIUM 5000 UNITS: 5000 INJECTION, SOLUTION INTRAVENOUS; SUBCUTANEOUS at 00:33

## 2017-04-24 RX ADMIN — ANTISEPTIC SURGICAL SCRUB: 0.04 SOLUTION TOPICAL at 20:27

## 2017-04-24 RX ADMIN — METHOCARBAMOL 500 MG: 500 TABLET ORAL at 05:14

## 2017-04-24 RX ADMIN — METHOCARBAMOL 500 MG: 500 TABLET ORAL at 11:56

## 2017-04-24 RX ADMIN — HEPARIN SODIUM 5000 UNITS: 5000 INJECTION, SOLUTION INTRAVENOUS; SUBCUTANEOUS at 08:42

## 2017-04-24 RX ADMIN — OXYCODONE HYDROCHLORIDE 10 MG: 5 SOLUTION ORAL at 05:13

## 2017-04-24 RX ADMIN — METOPROLOL TARTRATE 50 MG: 100 TABLET, FILM COATED ORAL at 20:29

## 2017-04-24 RX ADMIN — GABAPENTIN 200 MG: 250 SUSPENSION ORAL at 14:32

## 2017-04-24 RX ADMIN — GABAPENTIN 200 MG: 250 SUSPENSION ORAL at 05:13

## 2017-04-24 RX ADMIN — Medication 6 MG: at 20:29

## 2017-04-24 RX ADMIN — SENNOSIDES A AND B 5 ML: 415.36 LIQUID ORAL at 08:39

## 2017-04-24 RX ADMIN — ACETAMINOPHEN 975 MG: 325 SOLUTION ORAL at 11:56

## 2017-04-24 RX ADMIN — ACETAMINOPHEN 975 MG: 325 SOLUTION ORAL at 20:29

## 2017-04-24 RX ADMIN — LOPERAMIDE HYDROCHLORIDE 2 MG: 1 SOLUTION ORAL at 08:40

## 2017-04-24 RX ADMIN — HYOSCYAMINE SULFATE 125 MCG: 0.12 TABLET ORAL at 14:30

## 2017-04-24 RX ADMIN — HYDROMORPHONE HYDROCHLORIDE: 10 INJECTION, SOLUTION INTRAMUSCULAR; INTRAVENOUS; SUBCUTANEOUS at 05:23

## 2017-04-24 RX ADMIN — LOPERAMIDE HYDROCHLORIDE 2 MG: 1 SOLUTION ORAL at 01:02

## 2017-04-24 RX ADMIN — OXYCODONE HYDROCHLORIDE 10 MG: 5 SOLUTION ORAL at 20:29

## 2017-04-24 RX ADMIN — Medication 6 MG: at 01:02

## 2017-04-24 RX ADMIN — CHLORHEXIDINE GLUCONATE 15 ML: 1.2 RINSE ORAL at 08:38

## 2017-04-24 RX ADMIN — OXYCODONE HYDROCHLORIDE 10 MG: 5 SOLUTION ORAL at 11:56

## 2017-04-24 RX ADMIN — MULTIVITAMIN 15 ML: LIQUID ORAL at 08:38

## 2017-04-24 RX ADMIN — OXYCODONE HYDROCHLORIDE 5 MG: 5 SOLUTION ORAL at 02:44

## 2017-04-24 RX ADMIN — METHOCARBAMOL 500 MG: 500 TABLET ORAL at 00:34

## 2017-04-24 RX ADMIN — OXYCODONE HYDROCHLORIDE 10 MG: 5 SOLUTION ORAL at 16:09

## 2017-04-24 RX ADMIN — METHOCARBAMOL 500 MG: 500 TABLET ORAL at 20:29

## 2017-04-24 RX ADMIN — Medication 6 MG: at 14:32

## 2017-04-24 RX ADMIN — LOPERAMIDE HYDROCHLORIDE 2 MG: 1 SOLUTION ORAL at 20:29

## 2017-04-24 RX ADMIN — LEVALBUTEROL HYDROCHLORIDE 0.63 MG: 0.63 SOLUTION RESPIRATORY (INHALATION) at 16:16

## 2017-04-24 RX ADMIN — FUROSEMIDE 10 MG: 10 INJECTION, SOLUTION INTRAVENOUS at 11:55

## 2017-04-24 RX ADMIN — TRAZODONE HYDROCHLORIDE 25 MG: 150 TABLET ORAL at 00:33

## 2017-04-24 RX ADMIN — Medication 6 MG: at 08:40

## 2017-04-24 RX ADMIN — OXYCODONE HYDROCHLORIDE 10 MG: 5 SOLUTION ORAL at 00:33

## 2017-04-24 RX ADMIN — HYDROMORPHONE HYDROCHLORIDE: 10 INJECTION, SOLUTION INTRAMUSCULAR; INTRAVENOUS; SUBCUTANEOUS at 22:08

## 2017-04-24 RX ADMIN — GABAPENTIN 200 MG: 250 SUSPENSION ORAL at 22:07

## 2017-04-24 RX ADMIN — SODIUM CHLORIDE SOLN NEBU 3% 3 ML: 3 NEBU SOLN at 08:10

## 2017-04-24 RX ADMIN — ANTISEPTIC SURGICAL SCRUB: 0.04 SOLUTION TOPICAL at 08:30

## 2017-04-24 RX ADMIN — LEVALBUTEROL HYDROCHLORIDE 0.63 MG: 0.63 SOLUTION RESPIRATORY (INHALATION) at 08:10

## 2017-04-24 RX ADMIN — HEPARIN SODIUM 5000 UNITS: 5000 INJECTION, SOLUTION INTRAVENOUS; SUBCUTANEOUS at 16:18

## 2017-04-24 RX ADMIN — SODIUM CHLORIDE SOLN NEBU 3% 3 ML: 3 NEBU SOLN at 16:14

## 2017-04-24 ASSESSMENT — PAIN DESCRIPTION - DESCRIPTORS
DESCRIPTORS: ACHING
DESCRIPTORS: CRAMPING

## 2017-04-24 NOTE — PROGRESS NOTES
CLINICAL NUTRITION SERVICES - REASSESSMENT NOTE     Nutrition Prescription    RECOMMENDATIONS FOR MDs/PROVIDERS TO ORDER:  None today.     Malnutrition Status:    Severe malnutrition in the context of acute illness    Recommendations already ordered by Registered Dietitian (RD):  Change to 2 cans TwoCal + 2 cans Isosource 1.5 (pt's preference and per home regimen). Begin @ 20 mL/hr and adv 10 mL q8h to goal 40 mL/hr (960 mL) to provide 1676 kcal (41 kcal/kg), 73 g PRO (1.8 g/kg), 189 g CHO, 702 mL free water, 10 g fiber daily.    Future/Additional Recommendations:  Once tolerating above regimen at goal, transition to cycle of 80 mL/hr x 12 hours.     EVALUATION OF THE PROGRESS TOWARD GOALS   Diet: NPO    Nutrition Support: Impact Peptide @ 20 mL/hr to provide 720 kcals (18 kcal/kg), 45 g PRO (1.1 g/kg)    Intake: Average TF intake over past 4 days = 74 mL Impact Peptide + 761 mL D5% to provide 240 kcal (6 kcal/kg) and 7 g PRO (0.2 g/kg)     NEW FINDINGS   Labs: BUN low @ 5-7 over past 3 days - could suggest low PRO intakes;    GI: some diarrhea/loose stools (2x occurrence yesterday, 4x + 100 mL on 4/22)    MALNUTRITION  % Intake: </= 50% for >/= 5 days (severe)  % Weight Loss: None this admission but remains with > 7.5% in 3 months (severe)  Subcutaneous Fat Loss: Facial region, Upper arm, Lower arm and Thoracic/intercostal: Moderate  Muscle Loss: Temporal, Facial & jaw region, Thoracic region (clavicle, acromium bone, deltoid, trapezius, pectoral), Upper arm (bicep, tricep), Lower arm (forearm), Patellar region and Posterior calf: Moderate  Fluid Accumulation/Edema: None noted  Malnutrition Diagnosis: Severe malnutrition in the context of acute illness    Previous Goals   Diet adv v nutrition support within 2-3 days.  Evaluation: Met    Total avg nutritional intake to meet a minimum of 30 kcal/kg and 1.5 g PRO/kg daily (per dosing wt 41 kg).  Evaluation: Not met    Previous Nutrition Diagnosis  Inadequate  protein-energy intake related to increased needs post-op/repletion as evidenced by NPO without plan to start nutrition today.   Evaluation: Improving    CURRENT NUTRITION DIAGNOSIS  Inadequate protein-energy intake related to increased needs post-op/repletion, inadequate EN infusion as evidenced by 4-day average intakes met only 6 kcal/kg and 0.2 g/kg PRO.     INTERVENTIONS  Implementation  Collaboration with other providers - Discussed plan w/ MD who requested to return back to pt's home formula/regimen.   Enteral Nutrition - Modify composition and rate. Pt requested restart home formulas (TwoCal + Isosource 1.5). Pt gives 3-4 cans TwoCal + 1 can Isosource overnight x 12 hours but requested that this be changed to 50/50 mix of the formulas. Pt requested 2 cans TwoCal + 2 cans Isosource 1.5.     Goals  Total avg nutritional intake to meet a minimum of 30 kcal/kg and 1.5 g PRO/kg daily (per dosing wt 41 kg).    Monitoring/Evaluation  Progress toward goals will be monitored and evaluated per protocol.    Shae Mccarthy RD, LD  6B RD Pager: [phone]

## 2017-04-24 NOTE — PLAN OF CARE
Problem: Goal Outcome Summary  Goal: Goal Outcome Summary  Outcome: No Change  BP (!) 133/92 (BP Location: Right arm)  Pulse 96  Temp 98.3  F (36.8  C) (Oral)  Resp 18  Ht 1.524 m (5')  Wt 48 kg (105 lb 12.8 oz)  SpO2 97%  BMI 20.66 kg/m2     Neuro: A&OX4  Cardiac: Sinus rhythm sustains 's. SBP sustains 130's.  Resp: maintains sats >96% on RA  GI/: Adequate urine output. Loose stool x1. Incontinence of stool x1.  Diet/Appetite:  NPO, continuous tube feedings at 20mL/hr through j tube.   Skin: Many incisions stapled and dermabonded on chest, abdomen and from left chest tube removal. SPIT fistula removed on left side, dressing changed by MD this AM.  Access: R PIV with NS TKO  Drains: Right chest tube to water seal with minimal serosang drainage. J tube. Left pharyngostomy to LIS with bile output (flush q4hrs 30mL), scant bright red blood.   Activity: up with stand by assist to the commode  Pain: pain managed with PCA, scheduled oxy, PRN oxy, and tylenol. Frequent position changes has seemed to cause more pain.     Imodium x1, Trazodone given x1.  Oral suctioning done by pt has bile-looking output, notified MD, no interventions ordered d/t pharyngostomy flushing easily and suctioning well.      Will continue with plan of care and notify MD of any changes.

## 2017-04-24 NOTE — PROVIDER NOTIFICATION
Notified MD of bile-looking output coming from pt suctioning orally. Pharyngostomy flushes easily and is still suctioning. MD states no further interventions are needed at this time.

## 2017-04-24 NOTE — PLAN OF CARE
Problem: Goal Outcome Summary  Goal: Goal Outcome Summary  Video swallow evaluation completed per MD order. Pt presents with mild pharyngeal dysphagia, characterized by reduced epiglottic inversion, however, presence of pharyngostomy tube appears to be contributing to this. Flash penetration observed x1 with clear liquids-- no aspiration noted on exam. Chin tuck provided some relief from reported pain with swallowing and improved epiglottic movement. Esophagram completed following VFSS-- ree radiologists report for details. Ok to resume clear liquid diet from oral-pharyngeal swallow standpoint. Will defer to medical team for initiation of PO diet pending results of esophagram. Pt to sit upright for all PO intake, take small sips, and tuch chin with each swallow. ST to follow for PO tolerance and strategy/exercise training.

## 2017-04-24 NOTE — PLAN OF CARE
Problem: Goal Outcome Summary  Goal: Goal Outcome Summary  Pt AOx4 VSS; Pt main complaint was of stomach cramps. NSR - sinus tachycardia; afebrile; room air sats >92%. Crackles in bases; chest tube clamped no output on this shift. L pharyngostomy tube to LIS that is reddened around the site. Green bile-catalina output with scant amt of dark red; will monitor closely; flushing well 30cc q4h. C/o usual pain relieved with PCA and scheduled oxy. No c/o nausea. J-tube with TF at 20/hr; tolerating fine. Pt continues to have urgency/urge t have BM with little to no results. Will continue with care plan, continue to monitor and notify MD's of any changes.

## 2017-04-24 NOTE — PROGRESS NOTES
04/24/17 1044   General Information   Onset Date 04/17/17   Start of Care Date 04/24/17   Referring Physician Blas Dominguez MD   Patient Profile Review/OT: Additional Occupational Profile Info See Profile for full history and prior level of function   Patient/Family Goals Statement Pt wants to be able to drink liquids   Swallowing Evaluation Videofluoroscopic evaluation   Behaviorial Observations WFL (within functional limits)   Mode of current nutrition NPO;PEG   Respiratory Status Room air   Comments Orders received for swallow evaluation. 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 Known to this service from prior admission. Was discharged on clear liquid diet, and reportedly has continued clear liquids only since that time   Clinical Swallow Evaluation   Oral Musculature generally intact   Structural Abnormalities none present   Dentition present and adequate   Mucosal Quality good   Mandibular Strength and Mobility intact   Oral Labial Strength and Mobility WFL   Lingual Strength and Mobility WFL   Velar Elevation intact   Buccal Strength and Mobility intact   Laryngeal Function Cough;Throat clear;Swallow;Voicing initiated;Dry swallow palpated  (hoarse vocal quality-- Pt reports improving)   VFSS Evaluation   VFSS Additional Documentation Yes   VFSS Eval: Radiology   Radiologist resident   Views Taken left lateral   Physical Location of Procedure [facility] radiology dept rm 4   VFSS Eval: Thin Liquid Texture Trial   Mode of Presentation, Thin Liquid cup;straw;self-fed   Order of Presentation 1, 2, 3, 4, 5, 6   Preparatory Phase WFL   Oral Phase, Thin Liquid WFL   Pharyngeal Phase, Thin Liquid Delayed swallow reflex  (reduced epiglottoc movement--  possibly due to pharyngostomy)   Rosenbek's Penetration Aspiration Scale: Thin Liquid Trial Results 2 - contrast enters airway, remains above the vocal cords, no residue remains (penetration)  (flash penetration x1 only)   Successful Strategies Trialed During Procedure, Thin Liquid chin tuck  (to reduce impact of pharyngostomy tube)   Diagnostic Statement swallow functional for small sips of thin liquids   Swallow Compensations   Swallow Compensations Pacing;Reduce amounts;Chin tuck   Esophageal Phase of Swallow   Esophageal sweep performed during today s vidofluoroscopic exam  Please refer to radiologist's report for details   Esophageal comments esophagram completed to assess for leak-- see radiologists report for details.    General Therapy Interventions   Planned Therapy Interventions Dysphagia Treatment   Dysphagia treatment Oropharyngeal exercise training;Modified diet education;Instruction of safe swallow strategies   Swallow Eval: Clinical Impressions   Skilled Criteria for Therapy Intervention Skilled criteria met.  Treatment indicated.   Functional Assessment Scale (FAS) 5   Treatment Diagnosis mild pharyngeal dysphagia   Diet texture recommendations Clear liquid  (will defer to medical team for diet advance as appropriate)   Recommended Feeding/Eating Techniques maintain upright posture during/after eating for 30 mins;small sips/bites;tuck chin during every swallow   Demonstrates Need for Referral to Another Service occupational therapy;physical therapy;dietitian   Therapy Frequency 5 times/wk   Predicted Duration of Therapy Intervention (days/wks) 1 week   Anticipated Discharge Disposition home   Risks and Benefits of Treatment have been explained. Yes   Patient, family and/or staff in agreement with Plan of Care Yes   Clinical Impression Comments Video swallow evaluation completed per MD order. Pt presents with mild pharyngeal dysphagia, characterized by reduced epiglottic inversion, however, presence  of pharyngostomy tube appears to be contributing to this. Flash penetration observed x1 with clear liquids-- no aspiration noted on exam. Chin tuck provided some relief from reported pain with swallowing and improved epiglottic movement. Esophagram completed following VFSS-- ree radiologists report for details. Ok to resume clear liquid diet from oral-pharyngeal swallow standpoint. Will defer to medical team for initiation of PO diet pending results of esophagram. Pt to sit upright for all PO intake, take small sips, and tuch chin with each swallow. ST to follow for PO tolerance and strategy/exercise training.   Total Evaluation Time   Total Evaluation Time (Minutes) 12

## 2017-04-24 NOTE — PROGRESS NOTES
Thoracic Surgery Progress Note  Minerva Blanco  [id number]    Subjective  Uneventful night. Continues to have throat pain.  No further fevers.    Objective  Temp:  [97.8  F (36.6  C)-98.7  F (37.1  C)] 98.3  F (36.8  C)  Pulse:  [] 96  Heart Rate:  [] 114  Resp:  [14-18] 18  BP: (108-134)/(66-92) 133/92  SpO2:  [94 %-99 %] 97 %    Physical Exam:  Gen: Awake, alert, NAD  HEENT: Pharyngostomy functioning, takedown site c/d/i  CVS: Mild tachcyardia  Resp: NLB on RA, CT with minimal output  Abd: Soft, nt/nd  Extrem: WWP    UOP adequate  Pharyngostomy working 580/100  Significant stool    Cr normal  WBC 13.4, hgb stable    UA negative  C diff negative    CXR without significant infiltrate    Assessment & Plan  72 year old female with history of leak s/p topuet flundoplication, s/p multiple procedures, ultimately esophagectomy, 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      Neuro: PCA, c/w scheduled and PRN oxy; tylenol, doxepin, gabapentin, robaxin  Resp: PRN NC, CT clamped today, CXR this PM  Cardio: Metoprolol BID, inc to home 50  GI/FEN: NPO; XR swallow eval and SLP consult. restart tube feeds. Levsin for cramping.   Renal: Cr stable. Lasix today  Endo: SSI  Heme/ID: infectious workup negative thus far  PPx: SQH, no full anticoagulation yet (hx afib), PPI     Seen and discussed with fellow, Dr. Whitlock.    Blas Dominguez MD  PGY-1 Thoracic Surgery  [facility]

## 2017-04-24 NOTE — PLAN OF CARE
Problem: Goal Outcome Summary  Goal: Goal Outcome Summary  PT/6B: Patient participates in LE exercises as tolerated. Limited by stomach cramping today. Continue to recommend TCU at discharge.

## 2017-04-24 NOTE — PLAN OF CARE
Problem: Goal Outcome Summary  Goal: Goal Outcome Summary  Outcome: No Change  Pt is alert and oriented x4; VSS; NSR - sinus tachycardia; afebrile; room air sats >92%. Crackles bibasilar; R CT C/D/I; reddened around site; to waterseal; no output on this shift. L pharyngostomy tube to LIS; bile-catalina  output with scant amt of dark red; will monitor closely; flushing well 30cc q4h. C/o usual pain relieved with PCA and scheduled oxy. No c/o nausea. J-tube with TF at 20/hr; tolerating fine. Loose stool x1; but frequently getting up to commode still. PICC removed this evening and sent for culture. Phos replaced. Significant other at bedside. Will continue to monitor.

## 2017-04-25 ENCOUNTER — APPOINTMENT (OUTPATIENT)
Dept: GENERAL RADIOLOGY | Facility: CLINIC | Age: 72
DRG: 326 | End: 2017-04-25
Attending: STUDENT IN AN ORGANIZED HEALTH CARE EDUCATION/TRAINING PROGRAM
Payer: MEDICARE

## 2017-04-25 ENCOUNTER — APPOINTMENT (OUTPATIENT)
Dept: GENERAL RADIOLOGY | Facility: CLINIC | Age: 72
DRG: 326 | End: 2017-04-25
Attending: THORACIC SURGERY (CARDIOTHORACIC VASCULAR SURGERY)
Payer: MEDICARE

## 2017-04-25 ENCOUNTER — APPOINTMENT (OUTPATIENT)
Dept: OCCUPATIONAL THERAPY | Facility: CLINIC | Age: 72
DRG: 326 | End: 2017-04-25
Attending: THORACIC SURGERY (CARDIOTHORACIC VASCULAR SURGERY)
Payer: MEDICARE

## 2017-04-25 ENCOUNTER — APPOINTMENT (OUTPATIENT)
Dept: PHYSICAL THERAPY | Facility: CLINIC | Age: 72
DRG: 326 | End: 2017-04-25
Attending: THORACIC SURGERY (CARDIOTHORACIC VASCULAR SURGERY)
Payer: MEDICARE

## 2017-04-25 LAB
ANION GAP SERPL CALCULATED.3IONS-SCNC: 11 MMOL/L (ref 3–14)
BACTERIA SPEC CULT: NO GROWTH
BUN SERPL-MCNC: 13 MG/DL (ref 7–30)
CALCIUM SERPL-MCNC: 7.8 MG/DL (ref 8.5–10.1)
CHLORIDE SERPL-SCNC: 105 MMOL/L (ref 94–109)
CO2 SERPL-SCNC: 22 MMOL/L (ref 20–32)
CREAT SERPL-MCNC: 0.36 MG/DL (ref 0.52–1.04)
ERYTHROCYTE [DISTWIDTH] IN BLOOD BY AUTOMATED COUNT: 17.3 % (ref 10–15)
GFR SERPL CREATININE-BSD FRML MDRD: ABNORMAL ML/MIN/1.7M2
GLUCOSE BLDC GLUCOMTR-MCNC: 113 MG/DL (ref 70–99)
GLUCOSE BLDC GLUCOMTR-MCNC: 85 MG/DL (ref 70–99)
GLUCOSE BLDC GLUCOMTR-MCNC: 86 MG/DL (ref 70–99)
GLUCOSE BLDC GLUCOMTR-MCNC: 96 MG/DL (ref 70–99)
GLUCOSE BLDC GLUCOMTR-MCNC: 97 MG/DL (ref 70–99)
GLUCOSE SERPL-MCNC: 122 MG/DL (ref 70–99)
HCT VFR BLD AUTO: 24.4 % (ref 35–47)
HGB BLD-MCNC: 7.9 G/DL (ref 11.7–15.7)
INTERPRETATION ECG - MUSE: NORMAL
LACTATE BLD-SCNC: 2.5 MMOL/L (ref 0.7–2.1)
MAGNESIUM SERPL-MCNC: 1.8 MG/DL (ref 1.6–2.3)
MCH RBC QN AUTO: 27.4 PG (ref 26.5–33)
MCHC RBC AUTO-ENTMCNC: 32.4 G/DL (ref 31.5–36.5)
MCV RBC AUTO: 85 FL (ref 78–100)
MICRO REPORT STATUS: NORMAL
PHOSPHATE SERPL-MCNC: 4.1 MG/DL (ref 2.5–4.5)
PLATELET # BLD AUTO: 399 10E9/L (ref 150–450)
POTASSIUM SERPL-SCNC: 3.6 MMOL/L (ref 3.4–5.3)
RBC # BLD AUTO: 2.88 10E12/L (ref 3.8–5.2)
SODIUM SERPL-SCNC: 139 MMOL/L (ref 133–144)
SPECIMEN SOURCE: NORMAL
WBC # BLD AUTO: 14.4 10E9/L (ref 4–11)

## 2017-04-25 PROCEDURE — 84100 ASSAY OF PHOSPHORUS: CPT | Performed by: STUDENT IN AN ORGANIZED HEALTH CARE EDUCATION/TRAINING PROGRAM

## 2017-04-25 PROCEDURE — 80048 BASIC METABOLIC PNL TOTAL CA: CPT | Performed by: STUDENT IN AN ORGANIZED HEALTH CARE EDUCATION/TRAINING PROGRAM

## 2017-04-25 PROCEDURE — A9270 NON-COVERED ITEM OR SERVICE: HCPCS | Mod: GY | Performed by: SURGERY

## 2017-04-25 PROCEDURE — 71020 XR CHEST 2 VW: CPT | Mod: 76

## 2017-04-25 PROCEDURE — 25000132 ZZH RX MED GY IP 250 OP 250 PS 637: Mod: GY | Performed by: STUDENT IN AN ORGANIZED HEALTH CARE EDUCATION/TRAINING PROGRAM

## 2017-04-25 PROCEDURE — 71020 XR CHEST 2 VW: CPT

## 2017-04-25 PROCEDURE — 25000128 H RX IP 250 OP 636: Performed by: STUDENT IN AN ORGANIZED HEALTH CARE EDUCATION/TRAINING PROGRAM

## 2017-04-25 PROCEDURE — 36415 COLL VENOUS BLD VENIPUNCTURE: CPT | Performed by: STUDENT IN AN ORGANIZED HEALTH CARE EDUCATION/TRAINING PROGRAM

## 2017-04-25 PROCEDURE — 40000133 ZZH STATISTIC OT WARD VISIT

## 2017-04-25 PROCEDURE — 83605 ASSAY OF LACTIC ACID: CPT | Performed by: SURGERY

## 2017-04-25 PROCEDURE — 40000141 ZZH STATISTIC PERIPHERAL IV START W/O US GUIDANCE

## 2017-04-25 PROCEDURE — A9270 NON-COVERED ITEM OR SERVICE: HCPCS | Mod: GY | Performed by: STUDENT IN AN ORGANIZED HEALTH CARE EDUCATION/TRAINING PROGRAM

## 2017-04-25 PROCEDURE — 25000128 H RX IP 250 OP 636: Performed by: SURGERY

## 2017-04-25 PROCEDURE — 97116 GAIT TRAINING THERAPY: CPT | Mod: GP | Performed by: PHYSICAL THERAPIST

## 2017-04-25 PROCEDURE — 25000132 ZZH RX MED GY IP 250 OP 250 PS 637: Mod: GY | Performed by: NURSE PRACTITIONER

## 2017-04-25 PROCEDURE — 12000008 ZZH R&B INTERMEDIATE UMMC

## 2017-04-25 PROCEDURE — 85027 COMPLETE CBC AUTOMATED: CPT | Performed by: STUDENT IN AN ORGANIZED HEALTH CARE EDUCATION/TRAINING PROGRAM

## 2017-04-25 PROCEDURE — 25000132 ZZH RX MED GY IP 250 OP 250 PS 637: Mod: GY | Performed by: SURGERY

## 2017-04-25 PROCEDURE — 00000146 ZZHCL STATISTIC GLUCOSE BY METER IP

## 2017-04-25 PROCEDURE — 25000132 ZZH RX MED GY IP 250 OP 250 PS 637: Mod: GY | Performed by: PHYSICIAN ASSISTANT

## 2017-04-25 PROCEDURE — A9270 NON-COVERED ITEM OR SERVICE: HCPCS | Mod: GY | Performed by: PHYSICIAN ASSISTANT

## 2017-04-25 PROCEDURE — 97530 THERAPEUTIC ACTIVITIES: CPT | Mod: GP | Performed by: PHYSICAL THERAPIST

## 2017-04-25 PROCEDURE — 40000193 ZZH STATISTIC PT WARD VISIT: Performed by: PHYSICAL THERAPIST

## 2017-04-25 PROCEDURE — 97535 SELF CARE MNGMENT TRAINING: CPT | Mod: GO

## 2017-04-25 PROCEDURE — A9270 NON-COVERED ITEM OR SERVICE: HCPCS | Mod: GY | Performed by: NURSE PRACTITIONER

## 2017-04-25 PROCEDURE — 83735 ASSAY OF MAGNESIUM: CPT | Performed by: STUDENT IN AN ORGANIZED HEALTH CARE EDUCATION/TRAINING PROGRAM

## 2017-04-25 RX ORDER — POTASSIUM CHLORIDE 1.5 G/1.58G
20-40 POWDER, FOR SOLUTION ORAL
Status: DISCONTINUED | OUTPATIENT
Start: 2017-04-25 | End: 2017-04-25

## 2017-04-25 RX ORDER — POTASSIUM CHLORIDE 1.5 G/1.58G
20 POWDER, FOR SOLUTION ORAL 2 TIMES DAILY PRN
Status: DISCONTINUED | OUTPATIENT
Start: 2017-04-25 | End: 2017-04-28 | Stop reason: HOSPADM

## 2017-04-25 RX ORDER — SODIUM CHLORIDE FOR INHALATION 3 %
3 VIAL, NEBULIZER (ML) INHALATION
Status: DISCONTINUED | OUTPATIENT
Start: 2017-04-25 | End: 2017-04-28 | Stop reason: HOSPADM

## 2017-04-25 RX ORDER — POTASSIUM CHLORIDE 29.8 MG/ML
20 INJECTION INTRAVENOUS
Status: DISCONTINUED | OUTPATIENT
Start: 2017-04-25 | End: 2017-04-28 | Stop reason: HOSPADM

## 2017-04-25 RX ORDER — POTASSIUM CHLORIDE 7.45 MG/ML
10 INJECTION INTRAVENOUS
Status: DISCONTINUED | OUTPATIENT
Start: 2017-04-25 | End: 2017-04-28 | Stop reason: HOSPADM

## 2017-04-25 RX ORDER — LEVALBUTEROL INHALATION SOLUTION 0.63 MG/3ML
0.63 SOLUTION RESPIRATORY (INHALATION) EVERY 4 HOURS PRN
Status: DISCONTINUED | OUTPATIENT
Start: 2017-04-25 | End: 2017-04-28 | Stop reason: HOSPADM

## 2017-04-25 RX ORDER — POTASSIUM CL/LIDO/0.9 % NACL 10MEQ/0.1L
10 INTRAVENOUS SOLUTION, PIGGYBACK (ML) INTRAVENOUS
Status: DISCONTINUED | OUTPATIENT
Start: 2017-04-25 | End: 2017-04-28 | Stop reason: HOSPADM

## 2017-04-25 RX ORDER — POTASSIUM CHLORIDE 1.5 G/1.58G
20 POWDER, FOR SOLUTION ORAL
Status: DISCONTINUED | OUTPATIENT
Start: 2017-04-25 | End: 2017-04-25

## 2017-04-25 RX ORDER — HYDROMORPHONE HYDROCHLORIDE 1 MG/ML
0.5 INJECTION, SOLUTION INTRAMUSCULAR; INTRAVENOUS; SUBCUTANEOUS ONCE
Status: COMPLETED | OUTPATIENT
Start: 2017-04-25 | End: 2017-04-25

## 2017-04-25 RX ORDER — POTASSIUM CHLORIDE 750 MG/1
20-40 TABLET, EXTENDED RELEASE ORAL
Status: DISCONTINUED | OUTPATIENT
Start: 2017-04-25 | End: 2017-04-28 | Stop reason: HOSPADM

## 2017-04-25 RX ADMIN — Medication 6 MG: at 01:28

## 2017-04-25 RX ADMIN — ACETAMINOPHEN 975 MG: 325 SOLUTION ORAL at 20:57

## 2017-04-25 RX ADMIN — LIDOCAINE 1 PATCH: 50 PATCH TOPICAL at 21:00

## 2017-04-25 RX ADMIN — HYOSCYAMINE SULFATE 125 MCG: 0.12 TABLET ORAL at 14:57

## 2017-04-25 RX ADMIN — METOPROLOL TARTRATE 50 MG: 100 TABLET, FILM COATED ORAL at 21:14

## 2017-04-25 RX ADMIN — METHOCARBAMOL 500 MG: 500 TABLET ORAL at 18:05

## 2017-04-25 RX ADMIN — HEPARIN SODIUM 5000 UNITS: 5000 INJECTION, SOLUTION INTRAVENOUS; SUBCUTANEOUS at 15:06

## 2017-04-25 RX ADMIN — HYDROMORPHONE HYDROCHLORIDE: 10 INJECTION, SOLUTION INTRAMUSCULAR; INTRAVENOUS; SUBCUTANEOUS at 15:25

## 2017-04-25 RX ADMIN — Medication 6 MG: at 20:58

## 2017-04-25 RX ADMIN — HYOSCYAMINE SULFATE 125 MCG: 0.12 TABLET ORAL at 20:58

## 2017-04-25 RX ADMIN — GABAPENTIN 200 MG: 250 SUSPENSION ORAL at 14:56

## 2017-04-25 RX ADMIN — OXYCODONE HYDROCHLORIDE 10 MG: 5 SOLUTION ORAL at 20:57

## 2017-04-25 RX ADMIN — POTASSIUM CHLORIDE 20 MEQ: 1.5 POWDER, FOR SOLUTION ORAL at 20:58

## 2017-04-25 RX ADMIN — METHOCARBAMOL 500 MG: 500 TABLET ORAL at 12:28

## 2017-04-25 RX ADMIN — HYOSCYAMINE SULFATE 125 MCG: 0.12 TABLET ORAL at 09:18

## 2017-04-25 RX ADMIN — HYDROMORPHONE HYDROCHLORIDE 0.5 MG: 1 INJECTION, SOLUTION INTRAMUSCULAR; INTRAVENOUS; SUBCUTANEOUS at 12:18

## 2017-04-25 RX ADMIN — ACETAMINOPHEN 975 MG: 325 SOLUTION ORAL at 05:28

## 2017-04-25 RX ADMIN — HEPARIN SODIUM 5000 UNITS: 5000 INJECTION, SOLUTION INTRAVENOUS; SUBCUTANEOUS at 08:51

## 2017-04-25 RX ADMIN — PANTOPRAZOLE SODIUM 40 MG: 40 TABLET, DELAYED RELEASE ORAL at 08:57

## 2017-04-25 RX ADMIN — OXYCODONE HYDROCHLORIDE 10 MG: 5 SOLUTION ORAL at 01:27

## 2017-04-25 RX ADMIN — MULTIVITAMIN 15 ML: LIQUID ORAL at 08:58

## 2017-04-25 RX ADMIN — OXYCODONE HYDROCHLORIDE 10 MG: 5 SOLUTION ORAL at 05:29

## 2017-04-25 RX ADMIN — METHOCARBAMOL 500 MG: 500 TABLET ORAL at 01:27

## 2017-04-25 RX ADMIN — LOPERAMIDE HYDROCHLORIDE 2 MG: 1 SOLUTION ORAL at 08:54

## 2017-04-25 RX ADMIN — Medication 6 MG: at 14:56

## 2017-04-25 RX ADMIN — HYDROMORPHONE HYDROCHLORIDE: 10 INJECTION, SOLUTION INTRAMUSCULAR; INTRAVENOUS; SUBCUTANEOUS at 05:38

## 2017-04-25 RX ADMIN — OXYCODONE HYDROCHLORIDE 5 MG: 5 SOLUTION ORAL at 01:28

## 2017-04-25 RX ADMIN — DOXEPIN HYDROCHLORIDE 3 MG: 10 SOLUTION ORAL at 21:02

## 2017-04-25 RX ADMIN — ANTISEPTIC SURGICAL SCRUB: 0.04 SOLUTION TOPICAL at 10:17

## 2017-04-25 RX ADMIN — OXYCODONE HYDROCHLORIDE 10 MG: 5 SOLUTION ORAL at 12:34

## 2017-04-25 RX ADMIN — OXYCODONE HYDROCHLORIDE 10 MG: 5 SOLUTION ORAL at 08:58

## 2017-04-25 RX ADMIN — GABAPENTIN 200 MG: 250 SUSPENSION ORAL at 05:29

## 2017-04-25 RX ADMIN — METHOCARBAMOL 500 MG: 500 TABLET ORAL at 05:30

## 2017-04-25 RX ADMIN — Medication 1 LOZENGE: at 23:17

## 2017-04-25 RX ADMIN — HEPARIN SODIUM 5000 UNITS: 5000 INJECTION, SOLUTION INTRAVENOUS; SUBCUTANEOUS at 01:28

## 2017-04-25 RX ADMIN — LOPERAMIDE HYDROCHLORIDE 2 MG: 1 SOLUTION ORAL at 21:13

## 2017-04-25 RX ADMIN — OXYCODONE HYDROCHLORIDE 10 MG: 5 SOLUTION ORAL at 16:36

## 2017-04-25 RX ADMIN — OXYCODONE HYDROCHLORIDE 5 MG: 5 SOLUTION ORAL at 11:16

## 2017-04-25 RX ADMIN — HYDROMORPHONE HYDROCHLORIDE: 10 INJECTION, SOLUTION INTRAMUSCULAR; INTRAVENOUS; SUBCUTANEOUS at 21:06

## 2017-04-25 RX ADMIN — CHLORHEXIDINE GLUCONATE 15 ML: 1.2 RINSE ORAL at 09:18

## 2017-04-25 RX ADMIN — Medication 6 MG: at 08:58

## 2017-04-25 RX ADMIN — GABAPENTIN 200 MG: 250 SUSPENSION ORAL at 21:13

## 2017-04-25 ASSESSMENT — PAIN DESCRIPTION - DESCRIPTORS
DESCRIPTORS: CRAMPING

## 2017-04-25 NOTE — PLAN OF CARE
Problem: Goal Outcome Summary  Goal: Goal Outcome Summary  Outcome: No Change  /85 (BP Location: Right arm)  Pulse 87  Temp 98.4  F (36.9  C) (Oral)  Resp 20  Ht 1.524 m (5')  Wt 48 kg (105 lb 12.8 oz)  SpO2 96%  BMI 20.66 kg/m2     Pt AOx4, VSS. Pt having abdominal cramping and gas pain. SR/ST, RA. Crackles in bases. Chest Tube clamped, site CDI. L pharyngostomy tube to LIS, flushing q4h w/30cc. Green output. Abd pain relieved with PCA and scheduled oxy. No c/o nausea. J-tube with TF at 20/hr; tolerating fine. Pt continues to have urgency/urge to have BM with little to no results. Will continue with care plan, continue to monitor and notify MD's of any changes.

## 2017-04-25 NOTE — PROGRESS NOTES
THORACIC & FOREGUT SURGERY    S:  No acute overnight events.  Pt seen at bedside resting comfortably.   No acute complaints.    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       -Pharyngostomy and chest tube out today  -Continue current meds  -Initiate therapeutic lovenox today  -Likely dispo in 1-2 days if doing well    Saul Rogers PA-C  Thoracic Surgery

## 2017-04-25 NOTE — PROGRESS NOTES
Arrived on 7B approximately 1015.  Alert and sitting up in chair.  Medicated with Oxycodone 5mg for L-sided abdominal pain and cramping. Given  scheduled dose after chest tube and pharyngostomy tube removed. Dr ordered an additional 0.5mg of IV Dilaudid because of pain with removal. Up to commode x2 with urine and stool mixed. NPO. Pt to have CXR at 1600.  here in afternoon and very helpful.

## 2017-04-25 NOTE — PLAN OF CARE
Problem: Goal Outcome Summary  Goal: Goal Outcome Summary  Outcome: Improving  D;  Upon taking over care of pt at 07:00, pt's chest tube clamped with xray ordered for this am.  Pt left for xray this morning at 08:00 returning back at 08:15.  Team to determine if they will remove pt's clamped chest tube today based on the Xray results.  Pt up to the commode due to loose diarrhea stools.  I:  RN prepared pt for the xray, provided pt with medications per orders, increased tube feeding to rate of 30 cc/hour at 08:00 per orders assessing pt's response.  RN monitored pt's pain treating accordingly.  A;  Pt with orders to be transferred to  from the [facility] floor.  Report given to  oncoming RN, Pat answering questions as needed.  RN prepared pt for transfer up to the floor.    P:  Pt to continue with current plan of care on .  Pt left for 7B at 10:00 from the [facility] via wheelchair and RN.  Speech therapy to see pt on 7B for pharyngeal strengthening and swallow evaluation will see pt later today.          Problem: Pain, Acute (Adult)  Goal: Identify Related Risk Factors and Signs and Symptoms  Related risk factors and signs and symptoms are identified upon initiation of Human Response Clinical Practice Guideline (CPG)   Outcome: No Change    04/25/17 0623   Pain, Acute   Related Risk Factors (Acute Pain) patient perception;persistent pain;psychosocial factor   Signs and Symptoms (Acute Pain) guarding/abnormal posturing/positioning;verbalization of pain descriptors

## 2017-04-25 NOTE — PLAN OF CARE
Problem: Goal Outcome Summary  Goal: Goal Outcome Summary  PT/7b: pt is IND w/ STS transfers; ambulated w/ WW ~250 ft w/ SBA-CGA. Pt limited by abdominal cramping. Recommend pt disch home w/ resumed home PT to address her strength, endurance, and postural retraining needs.

## 2017-04-25 NOTE — PLAN OF CARE
Problem: Goal Outcome Summary  Goal: Goal Outcome Summary  Outcome: No Change  BP 99/55 (BP Location: Right arm)  Pulse 87  Temp 98.6  F (37  C) (Oral)  Resp 20  Ht 1.524 m (5')  Wt 46.9 kg (103 lb 6.4 oz)  SpO2 98%  BMI 20.19 kg/m2     Neuro: A&OX4.   Cardiac: sinus rhythm 80-low 100's  Resp: sats maintained >96% on RA  GI/: voids spontaneously.   Diet/Appetite: NPO, tube feeds switched to new formula. Going at 20mL/hr. To be advanced by 10mL q8hrs to a goal of 40mL/hr.  Skin: Stapled incisions clean, dry and intact open to air, takedown spit fistula UTV clean dry and intact. Reddened around L pharyngostomy and J tube. Cleaned area around J tube that was leaking minimally with soap and water.  Access: R PIV running NS at TKO with Hydromorphone from PCA.  Drains: R chest tube clamped. L pharyngostomy to LIS with green output, occasional scant dark red blood. Flushing with 30mL q4hrs.  Activity: up with standby to the commode.  Pain: In L neck and L abdomen being managed with scheduled and PRN (X1) Oxy, and PCA Hydromorphone.     Lactic acid protocol triggered 0500 for WBC 4/24 and  when up to commode. MD notified, Lactate ordered to be drawn.   CXR for R chest tube planned for today. Novolog not given x2 d/t BS 80-90's     Will continue with plan of care and notify MD of any changes.

## 2017-04-25 NOTE — PLAN OF CARE
Problem: Goal Outcome Summary  Goal: Goal Outcome Summary  SLP: Dysphagia therapy cancelled.  Pt transferring units at the time of session attempt.  ST to follow.

## 2017-04-26 ENCOUNTER — APPOINTMENT (OUTPATIENT)
Dept: SPEECH THERAPY | Facility: CLINIC | Age: 72
DRG: 326 | End: 2017-04-26
Attending: THORACIC SURGERY (CARDIOTHORACIC VASCULAR SURGERY)
Payer: MEDICARE

## 2017-04-26 ENCOUNTER — APPOINTMENT (OUTPATIENT)
Dept: GENERAL RADIOLOGY | Facility: CLINIC | Age: 72
DRG: 326 | End: 2017-04-26
Attending: STUDENT IN AN ORGANIZED HEALTH CARE EDUCATION/TRAINING PROGRAM
Payer: MEDICARE

## 2017-04-26 LAB
ANION GAP SERPL CALCULATED.3IONS-SCNC: 9 MMOL/L (ref 3–14)
BUN SERPL-MCNC: 13 MG/DL (ref 7–30)
CALCIUM SERPL-MCNC: 7.8 MG/DL (ref 8.5–10.1)
CHLORIDE SERPL-SCNC: 105 MMOL/L (ref 94–109)
CO2 SERPL-SCNC: 26 MMOL/L (ref 20–32)
CREAT SERPL-MCNC: 0.36 MG/DL (ref 0.52–1.04)
ERYTHROCYTE [DISTWIDTH] IN BLOOD BY AUTOMATED COUNT: 17.3 % (ref 10–15)
GFR SERPL CREATININE-BSD FRML MDRD: ABNORMAL ML/MIN/1.7M2
GLUCOSE BLDC GLUCOMTR-MCNC: 103 MG/DL (ref 70–99)
GLUCOSE BLDC GLUCOMTR-MCNC: 107 MG/DL (ref 70–99)
GLUCOSE BLDC GLUCOMTR-MCNC: 111 MG/DL (ref 70–99)
GLUCOSE BLDC GLUCOMTR-MCNC: 112 MG/DL (ref 70–99)
GLUCOSE BLDC GLUCOMTR-MCNC: 114 MG/DL (ref 70–99)
GLUCOSE BLDC GLUCOMTR-MCNC: 140 MG/DL (ref 70–99)
GLUCOSE SERPL-MCNC: 105 MG/DL (ref 70–99)
HCT VFR BLD AUTO: 21.9 % (ref 35–47)
HGB BLD-MCNC: 7.1 G/DL (ref 11.7–15.7)
MAGNESIUM SERPL-MCNC: 2 MG/DL (ref 1.6–2.3)
MCH RBC QN AUTO: 27.4 PG (ref 26.5–33)
MCHC RBC AUTO-ENTMCNC: 32.4 G/DL (ref 31.5–36.5)
MCV RBC AUTO: 85 FL (ref 78–100)
PHOSPHATE SERPL-MCNC: 3.1 MG/DL (ref 2.5–4.5)
PLATELET # BLD AUTO: 375 10E9/L (ref 150–450)
POTASSIUM SERPL-SCNC: 3.7 MMOL/L (ref 3.4–5.3)
RBC # BLD AUTO: 2.59 10E12/L (ref 3.8–5.2)
SODIUM SERPL-SCNC: 140 MMOL/L (ref 133–144)
WBC # BLD AUTO: 13.5 10E9/L (ref 4–11)

## 2017-04-26 PROCEDURE — 84100 ASSAY OF PHOSPHORUS: CPT | Performed by: STUDENT IN AN ORGANIZED HEALTH CARE EDUCATION/TRAINING PROGRAM

## 2017-04-26 PROCEDURE — 40000225 ZZH STATISTIC SLP WARD VISIT

## 2017-04-26 PROCEDURE — 40000556 ZZH STATISTIC PERIPHERAL IV START W US GUIDANCE

## 2017-04-26 PROCEDURE — 80048 BASIC METABOLIC PNL TOTAL CA: CPT | Performed by: STUDENT IN AN ORGANIZED HEALTH CARE EDUCATION/TRAINING PROGRAM

## 2017-04-26 PROCEDURE — A9270 NON-COVERED ITEM OR SERVICE: HCPCS | Mod: GY | Performed by: SURGERY

## 2017-04-26 PROCEDURE — 25000132 ZZH RX MED GY IP 250 OP 250 PS 637: Mod: GY | Performed by: STUDENT IN AN ORGANIZED HEALTH CARE EDUCATION/TRAINING PROGRAM

## 2017-04-26 PROCEDURE — 36415 COLL VENOUS BLD VENIPUNCTURE: CPT | Performed by: STUDENT IN AN ORGANIZED HEALTH CARE EDUCATION/TRAINING PROGRAM

## 2017-04-26 PROCEDURE — 92526 ORAL FUNCTION THERAPY: CPT | Mod: GN

## 2017-04-26 PROCEDURE — 25000132 ZZH RX MED GY IP 250 OP 250 PS 637: Mod: GY | Performed by: PHYSICIAN ASSISTANT

## 2017-04-26 PROCEDURE — 71020 XR CHEST 2 VW: CPT

## 2017-04-26 PROCEDURE — 83735 ASSAY OF MAGNESIUM: CPT | Performed by: STUDENT IN AN ORGANIZED HEALTH CARE EDUCATION/TRAINING PROGRAM

## 2017-04-26 PROCEDURE — 85027 COMPLETE CBC AUTOMATED: CPT | Performed by: STUDENT IN AN ORGANIZED HEALTH CARE EDUCATION/TRAINING PROGRAM

## 2017-04-26 PROCEDURE — 25000132 ZZH RX MED GY IP 250 OP 250 PS 637: Mod: GY | Performed by: SURGERY

## 2017-04-26 PROCEDURE — 99233 SBSQ HOSP IP/OBS HIGH 50: CPT | Performed by: NURSE PRACTITIONER

## 2017-04-26 PROCEDURE — 25000132 ZZH RX MED GY IP 250 OP 250 PS 637: Mod: GY | Performed by: NURSE PRACTITIONER

## 2017-04-26 PROCEDURE — 00000146 ZZHCL STATISTIC GLUCOSE BY METER IP

## 2017-04-26 PROCEDURE — 12000008 ZZH R&B INTERMEDIATE UMMC

## 2017-04-26 PROCEDURE — A9270 NON-COVERED ITEM OR SERVICE: HCPCS | Mod: GY | Performed by: PHYSICIAN ASSISTANT

## 2017-04-26 PROCEDURE — 99207 ZZC NO CHARGE CURBSIDE PS: CPT | Performed by: NURSE PRACTITIONER

## 2017-04-26 PROCEDURE — 25000128 H RX IP 250 OP 636: Performed by: STUDENT IN AN ORGANIZED HEALTH CARE EDUCATION/TRAINING PROGRAM

## 2017-04-26 PROCEDURE — A9270 NON-COVERED ITEM OR SERVICE: HCPCS | Mod: GY | Performed by: NURSE PRACTITIONER

## 2017-04-26 PROCEDURE — A9270 NON-COVERED ITEM OR SERVICE: HCPCS | Mod: GY | Performed by: STUDENT IN AN ORGANIZED HEALTH CARE EDUCATION/TRAINING PROGRAM

## 2017-04-26 PROCEDURE — 25000125 ZZHC RX 250: Performed by: SURGERY

## 2017-04-26 RX ORDER — GABAPENTIN 250 MG/5ML
300 SOLUTION ORAL EVERY 8 HOURS SCHEDULED
Status: DISCONTINUED | OUTPATIENT
Start: 2017-04-26 | End: 2017-04-28 | Stop reason: HOSPADM

## 2017-04-26 RX ORDER — HYDROMORPHONE HYDROCHLORIDE 1 MG/ML
.3-.5 INJECTION, SOLUTION INTRAMUSCULAR; INTRAVENOUS; SUBCUTANEOUS EVERY 4 HOURS PRN
Status: DISCONTINUED | OUTPATIENT
Start: 2017-04-26 | End: 2017-04-28

## 2017-04-26 RX ORDER — LIDOCAINE 50 MG/G
1-3 PATCH TOPICAL
Status: DISCONTINUED | OUTPATIENT
Start: 2017-04-26 | End: 2017-04-28 | Stop reason: HOSPADM

## 2017-04-26 RX ORDER — FUROSEMIDE 10 MG/ML
20 INJECTION INTRAMUSCULAR; INTRAVENOUS ONCE
Status: COMPLETED | OUTPATIENT
Start: 2017-04-26 | End: 2017-04-26

## 2017-04-26 RX ORDER — SIMETHICONE 40MG/0.6ML
40 SUSPENSION, DROPS(FINAL DOSAGE FORM)(ML) ORAL EVERY 6 HOURS PRN
Status: DISCONTINUED | OUTPATIENT
Start: 2017-04-26 | End: 2017-04-28 | Stop reason: HOSPADM

## 2017-04-26 RX ORDER — GUAR GUM
1 PACKET (EA) ORAL 2 TIMES DAILY
Status: DISCONTINUED | OUTPATIENT
Start: 2017-04-26 | End: 2017-04-28 | Stop reason: HOSPADM

## 2017-04-26 RX ORDER — OXYCODONE HCL 5 MG/5 ML
10-15 SOLUTION, ORAL ORAL
Status: DISCONTINUED | OUTPATIENT
Start: 2017-04-26 | End: 2017-04-28

## 2017-04-26 RX ADMIN — GABAPENTIN 300 MG: 250 SOLUTION ORAL at 21:24

## 2017-04-26 RX ADMIN — Medication 6 MG: at 10:03

## 2017-04-26 RX ADMIN — POTASSIUM CHLORIDE 10 MEQ: 14.9 INJECTION, SOLUTION, CONCENTRATE PARENTERAL at 14:33

## 2017-04-26 RX ADMIN — OXYCODONE HYDROCHLORIDE 10 MG: 5 SOLUTION ORAL at 13:45

## 2017-04-26 RX ADMIN — Medication 6 MG: at 20:15

## 2017-04-26 RX ADMIN — FUROSEMIDE 20 MG: 10 INJECTION, SOLUTION INTRAVENOUS at 19:07

## 2017-04-26 RX ADMIN — SIMETHICONE 40 MG: 20 SUSPENSION/ DROPS ORAL at 14:13

## 2017-04-26 RX ADMIN — HYOSCYAMINE SULFATE 125 MCG: 0.12 TABLET ORAL at 20:14

## 2017-04-26 RX ADMIN — DOXEPIN HYDROCHLORIDE 3 MG: 10 SOLUTION ORAL at 21:24

## 2017-04-26 RX ADMIN — GABAPENTIN 300 MG: 250 SOLUTION ORAL at 13:54

## 2017-04-26 RX ADMIN — ACETAMINOPHEN 975 MG: 325 SOLUTION ORAL at 04:44

## 2017-04-26 RX ADMIN — ACETAMINOPHEN 975 MG: 325 SOLUTION ORAL at 20:15

## 2017-04-26 RX ADMIN — RANITIDINE HYDROCHLORIDE 150 MG: 15 SOLUTION ORAL at 20:16

## 2017-04-26 RX ADMIN — Medication 6 MG: at 13:53

## 2017-04-26 RX ADMIN — HYOSCYAMINE SULFATE 125 MCG: 0.12 TABLET ORAL at 13:53

## 2017-04-26 RX ADMIN — HYDROMORPHONE HYDROCHLORIDE: 10 INJECTION, SOLUTION INTRAMUSCULAR; INTRAVENOUS; SUBCUTANEOUS at 14:33

## 2017-04-26 RX ADMIN — ACETAMINOPHEN 975 MG: 325 SOLUTION ORAL at 12:23

## 2017-04-26 RX ADMIN — OXYCODONE HYDROCHLORIDE 10 MG: 5 SOLUTION ORAL at 20:16

## 2017-04-26 RX ADMIN — METHOCARBAMOL 500 MG: 500 TABLET ORAL at 13:53

## 2017-04-26 RX ADMIN — LOPERAMIDE HYDROCHLORIDE 2 MG: 1 SOLUTION ORAL at 09:39

## 2017-04-26 RX ADMIN — LOPERAMIDE HYDROCHLORIDE 2 MG: 1 SOLUTION ORAL at 20:16

## 2017-04-26 RX ADMIN — Medication 6 MG: at 02:59

## 2017-04-26 RX ADMIN — HEPARIN SODIUM 5000 UNITS: 5000 INJECTION, SOLUTION INTRAVENOUS; SUBCUTANEOUS at 00:33

## 2017-04-26 RX ADMIN — HYOSCYAMINE SULFATE 125 MCG: 0.12 TABLET ORAL at 09:40

## 2017-04-26 RX ADMIN — METHOCARBAMOL 500 MG: 500 TABLET ORAL at 09:39

## 2017-04-26 RX ADMIN — METHOCARBAMOL 500 MG: 500 TABLET ORAL at 16:40

## 2017-04-26 RX ADMIN — ENOXAPARIN SODIUM 50 MG: 60 INJECTION SUBCUTANEOUS at 20:14

## 2017-04-26 RX ADMIN — GABAPENTIN 200 MG: 250 SUSPENSION ORAL at 05:52

## 2017-04-26 RX ADMIN — OXYCODONE HYDROCHLORIDE 10 MG: 5 SOLUTION ORAL at 00:33

## 2017-04-26 RX ADMIN — LIDOCAINE 2 PATCH: 50 PATCH TOPICAL at 20:34

## 2017-04-26 RX ADMIN — OXYCODONE HYDROCHLORIDE 5 MG: 5 SOLUTION ORAL at 02:59

## 2017-04-26 RX ADMIN — METHOCARBAMOL 500 MG: 500 TABLET ORAL at 20:14

## 2017-04-26 RX ADMIN — PANTOPRAZOLE SODIUM 40 MG: 40 TABLET, DELAYED RELEASE ORAL at 09:38

## 2017-04-26 RX ADMIN — INSULIN ASPART 1 UNITS: 100 INJECTION, SOLUTION INTRAVENOUS; SUBCUTANEOUS at 21:24

## 2017-04-26 RX ADMIN — MENTHOL 1 PATCH: 205.5 PATCH TOPICAL at 14:04

## 2017-04-26 RX ADMIN — MULTIVITAMIN 15 ML: LIQUID ORAL at 10:03

## 2017-04-26 RX ADMIN — METHOCARBAMOL 500 MG: 500 TABLET ORAL at 00:33

## 2017-04-26 RX ADMIN — OXYCODONE HYDROCHLORIDE 10 MG: 5 SOLUTION ORAL at 16:39

## 2017-04-26 RX ADMIN — OXYCODONE HYDROCHLORIDE 10 MG: 5 SOLUTION ORAL at 09:28

## 2017-04-26 RX ADMIN — HEPARIN SODIUM 5000 UNITS: 5000 INJECTION, SOLUTION INTRAVENOUS; SUBCUTANEOUS at 10:07

## 2017-04-26 RX ADMIN — POTASSIUM CHLORIDE 10 MEQ: 14.9 INJECTION, SOLUTION, CONCENTRATE PARENTERAL at 19:11

## 2017-04-26 RX ADMIN — METOPROLOL TARTRATE 50 MG: 100 TABLET, FILM COATED ORAL at 20:15

## 2017-04-26 RX ADMIN — RANITIDINE HYDROCHLORIDE 150 MG: 15 SOLUTION ORAL at 12:22

## 2017-04-26 RX ADMIN — OXYCODONE HYDROCHLORIDE 10 MG: 5 SOLUTION ORAL at 04:44

## 2017-04-26 ASSESSMENT — PAIN DESCRIPTION - DESCRIPTORS: DESCRIPTORS: CRAMPING

## 2017-04-26 NOTE — PROGRESS NOTES
THORACIC & FOREGUT SURGERY    S:  No acute overnight events.  Pt seen at bedside resting comfortably. Reports continued pain, moderately controlled on PCA. Ambulating, tolerating sips. BMs overnight.    O:  Vitals:    04/25/17 2159 04/26/17 0000 04/26/17 0400 04/26/17 0824   BP: 96/52 122/53 103/54 106/54   BP Location: Left arm  Left arm Left arm   Pulse:       Resp: 20 20 18 18   Temp: 99  F (37.2  C) 97.1  F (36.2  C) 97.8  F (36.6  C) 97.3  F (36.3  C)   TempSrc: Oral Oral Oral Oral   SpO2: 97% 99% 97% 97%   Weight:       Height:           A&Ox3, NAD  Neck dressing changed at bedside, healing well  Breathing non-labored  Soft, NDNT  Distal extremities warm    A/P: Minerva Blanco is a 72 year old female with history of leak s/p topuet fundoplication, s/p multiple procedures, ultimately esophagectomy, 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       - Continue current meds  - Pain consult, appreciate recs. Discussed preliminarily, will DC PCA and transition to oral PRNs with IV breakthrough.  - Likely dispo in 1-2 days if doing well    Discussed with fellow, Dr. Whitlock.    Blas Dominguez MD  PGY-1 Thoracic Surgery  [facility]

## 2017-04-26 NOTE — PLAN OF CARE
Problem: Individualization  Goal: Patient Preferences  Afeb, vitals stable, 02 sats 99-97% on room air, states pain is tolerable, on dilaudid pca and scheduled oxycodone with relief, abd lap sites dry, intact and stapled, chest drsgs(old CT sites and pharngostomysites), tube feeds increased to 40cc/hour at 0100, c/o belly cramping and bile reflux, no c/o nausea, glucoses 111 and 112, no coverage needed, bilat LE swollen with positive pedal pulses, legs elevated on pillows, up to commode x3, voiding in adequate amts, small amts of stool out,  appeared to rest/sleep between cares, offers no further c/o

## 2017-04-26 NOTE — PLAN OF CARE
Problem: Goal Outcome Summary  Goal: Goal Outcome Summary  PT/7b: PT attempted x2 this AM - pt requesting PT to return later as she was eating a popsicle, when PT returned at requested time, pt w/ visitors and pt declines PT. cx

## 2017-04-26 NOTE — PLAN OF CARE
Problem: Goal Outcome Summary  Goal: Goal Outcome Summary  Outcome: Therapy, progress towards functional goals is fair  OT-7B: Therapist educated pt on and reviewed sternal precautions. Educated pt on LE dressing within precautions. Pt ambulated ~200ft with CGA, vc's and use of IV pole for stability. Pt tolerated this activity well. VSS.      REC: Discharge Home with A and Home OT/PT when medically appropriate.

## 2017-04-26 NOTE — CONSULTS
"Inpatient Pain Management Service: Re-Consultation    Inpatient Pain Service re-consulted during the same admission for the same issue,  postoperative pain management. This visit will be billed as a follow-up visit.     DATE OF CONSULT: April 26, 2017      REASON FOR PAIN CONSULTATION:  Minerva Blanco is a 72 year old female I am seeing in consultation at the request of Dr. Blas Dominguez MD for evaluation and recommendations for her acute postoperative pain.       CHIEF PAIN COMPLAINT: 1. neck/throat pain, 2. Chest pain (spit fistula site pain), 3.abdominal pain, and 4. left thigh/hip pain.       ASSESSMENT:   1. Acute post operative pain status post neck dissection, spit fistula takedown, laparoscopic jejunostomy tube and pharyngostomy tube with gastric pull up on 04/17/17. Patient reporting neck/throat pain with left anterior chest pain at spit fistula site. Patient now POD #9 and currently on hydromorphone PCA, and oral oxycodone scheduled and PRN.   2. Acute abdominal pain, that appears to be related to GI cramping and gas pain. Patient reports cramping,  gas and bloating that causes increased pain.  Patient currently on hyoscyamine, and I agree patient would benefit from addition of simethicone, per pain note from 04/19/17.  3. Acute left hip/thigh pain, patient reports it feels like a \"charley horse\". No workup pending by the primary team at this time. Pain appears to be myofascial in nature. Patient already on methocarbamol for muscle relaxant. Plan to increase dose.   4. History of proximal gastrectomy, distal esophagectomy, spit fistula creation, and left thoracotomy with mediastinal phlegmon excision on 1/9/2017  5. History of Toupet fundoplication 1/2016 with a post operative course that was complicated by esophageal perforation with mediastinal phlegmon.  6. History of a fib, warfarin now being held.  7. History of breast cancer  8. History of fibromyalgia  9. History of multiple sclerosis  10. History of " hiatal hernia  11. History of IBS  12. Patient has upcoming outpatient pain appointment with [facility] on 05/04/17.     Outpatient medications related to pain prior to admission:   --oxycodone 5-10 mg PO Q 6 hours PRN (patient reported using 30-40 mg per day on average).   Outpatient opioids prescribed by: LAUREL Telles  PRIMARY CARE PROVIDER: Andrea Nino  PAIN SPECIALIST: NONE    [place]  database reviewed: reviewed      TREATMENT RECOMMENDATIONS/PLAN:   1. Discontinue HYDROmorphone PCA  2. Change oxycodone liquid to 10-15 mg via FT Q 3 hours PRN  3. Start hydromorphone 0.3-0.5 mg IV Q 4 hours PRN, x 24 hours then discontinue,  for rescue only, ok to give 15 minutes prior to activity or physical therapy.   4. Continue oxycodone liquid 10 mg via FT Q 4 hours scheduled. Would recommend tapering this off prior to discharge.   5. Change gabapentin liquid  to 300 mg via FT Q 8 hours  6. Continue to monitor LFTs while on scheduled acetaminophen. Pending LFTs continue acetaminophen liquid 975 mg via FT Q 8 hours.   7. Defer use of NSAID to Thoracic Surgery.  8. Start simethicone liquid 40 mg via FT Q 6 hours PRN for gas pain.   9. Change hyoscyamine to 125mcg PO Q 8 hours  10. Continue methocarbamol to 500 mg via FT Q 6 hours.   11. Change lidocaine 5% patches to, 1-3 patches TD Q 12 hours on and 12 hours off. (maximum of 3 patches at a time). Apply to most painful areas, don't apply over incisions.   12. Start menthol patch, 1 patch, TD Q 8 hours, apply when lidocaine patches are off. Apply to most painful areas.   13. Continue benzocaine-menthol 6-10mg lozenge BU Q1 hour PRN for sore throat.  14. Continue phenol 1.4% spray MT Q 1 hour PRN for sore throat.  15. Change scheduled sennosides and docusate to PRN, as patient is having loose stools and is on loperamide liquid 2 mg via FT BID.       ASSESSMENT AND RECOMMENDATIONS DISCUSSED WITH: Dr. Blas Dominguez MD, plan discussed  with Dr. Mike Enamorado MD from the pain service.       Thank you for consulting the Inpatient Pain Management Service.   The above recommendations are to be acted upon at the primary team s discretion.    To reach us:  Mon - Friday 8 AM - 3 PM: Pager [phone]   After hours, weekends and holidays: Primary service should call [phone] for the on-call pain specialist    HISTORY OF PRESENT ILLNESS: Minerva Blanco is a 72 year old female with history of MS, Breast cancer, hiatal hernia, IBS. She has a history of abdominal/thoracic pain that is secondary to esophageal perforation, and underwent  proximal gastrectomy, distal esophagectomy, spit fistula creation, and left thoracotomy with mediastinal phlegmon excision on 1/9/2017. Patient had difficult to control postoperative pain with her procedure in January 2017. Patient underwent neck dissection, spit fistula takedown, laparoscopic jejunostomy tube and pharyngostomy tube with gastric pull up on 04/17/17, and is now POD #9. Patient was seen in the PAC clinic prior to her surgery and was seen in consultation by the Pain Service on POD # 1 and made recommendations at that time to transition to orals. Patient maintanted on hydromorphone PCA by primary team since that time, and has been using on average 10-16 mg total daily dose of IV hydromorphone per day, over the past 5 days, in addition to getting oxycodone 10 mg scheduled Q 4 hours and oxycodone 5 mg PO Q 3 hours PRN. Plan is for patient to discharge on Friday per discussion with primary team today.    Today patient resting comfortably in bed, in no distress. She reports multiple pain sites, including her neck/throat, left anterior spit fistula site, abdomen, and left anterior thigh/hip. She reports her pain is tolerable with discomfort at this time and states the pain is getting better. She reports her pain is most severe in her throat, followed by her chest, abdomen then left thigh. She reports her  "throat pain feels like a lump in her throat and is sharp pain that is worse with swallowing anything, and reports increased pain with activity. She reports phenol spray and benzocaine lozenges help a little. She reports some improvement in pain when she hits the PCA button. She reports her left anterior chest pain is dull, achy, constant, abdominal pain is cramping, bloating, gas pain, and worse with medication administration via FT. She reports having loose stools that she associates with increased abdominal cramping. She report left hip and thigh pain that started about 3 days ago, and describes the pain as a \"charley horse\". She is up ambulating in the room and working with PT. She reports sleeping off and on last night. She denies illicit drug use and denies ETOH abuse.     PAIN HISTORY:   CAPA (Clinically Aligned Pain Assessment)  Comfort (How is your pain?): Tolerable with discomfort  Change in Pain (Since your last medication/intervention?): Getting better  Pain Control (How are your pain treatments working?): Partially effective pain control  Functioning (Are you able to do activities to get better?) : Pain keeps me from doing most of what I need to do  Sleep (Does your pain management allow you to sleep or rest?): Awake with occasional pain       FUNCTIONAL STATUS:  Change:      improving  Oral intake:     Tube feedings  Bowel function:    Liquid or diarrhea 2 times per day  Activity level:     Up with assistance ambulating in room  Sleep:      hours/night: sleeping off and on about 3-4 hours total per patient report  Mood:      adjusting to situation      REVIEW OF 10 BODY SYSTEMS: 10 point ROS of systems including Constitutional, Eyes, Respiratory, Cardiovascular, Gastroenterology, Genitourinary, Integumentary, Musculoskeletal, Psychiatric were all negative except for pertinent positives noted in my HPI.       INPATIENT MEDICATIONS PERTINENT TO PAIN CONSULT:   --hydromorphone PCA 0.2-0.3mg Q 10 minute " lockout with NO continuous rate. (used 15mg from 7MA-7AM).  --oxycodone liquid 10 mg via FT Q4 hours scheduled   --oxycodone 5 mg via FT Q 3 hours PRN (used 10 mg from 7AM-7AM)  --opium tincture 10mg/ml liquid 6 mg PO Q 6 hours  --acetaminophen liquid  975mg PO Q 8 hours  --gabapentin liquid 200mg PO TID  --lidocaine 5% patches, 1-2 patches, TD Q 12 hours on and 12 hours off.  --hyoscyamine 125mcg PO TID.  --methocarbamol 500mg PO 4x daily   --phenol 1.4% spray MT Q 1 hour PRN  --benzocaine-menthol 6-10mg lozenge, 1 lozenge BU Q 1 hour PRN  --sennosides liquid 5 ml via FT BID  --docusate liquid 100mg via FT QHS  --loperamide liquid 2 mg via FT BID and loperamide liquid 2 mg via FT 4 x per day PRN.   --trazodone liquid 25 mg PO Q HS PRN    PAST PAIN TREATMENTS:   Morphine- itching  Hydromorphone - previously caused some itching, but patient tolerated well during last hospitalization and open to using this again for postop pain management.   Gabapentin - worked well for postop pain, tapered off as an outpatient once postop pain had improved. Willing to try again for postop pain management.     CURRENT MEDICATIONS:   Current Facility-Administered Medications Ordered in Epic   Medication Dose Route Frequency Provider Last Rate Last Dose     ranitidine (Zantac) syrup 150 mg  150 mg Oral BID Qi, Blas Acosta MD         oxyCODONE (ROXICODONE) solution 10-15 mg  10-15 mg Oral Q3H PRN Qi, Blas Acosta MD         HYDROmorphone (PF) (DILAUDID) injection 0.3-0.5 mg  0.3-0.5 mg Intravenous Q4H PRN Qi, Blas Acosta MD         gabapentin (NEURONTIN) solution 300 mg  300 mg Oral Q8H ANDREI Qi, Blas Acosta MD         levalbuterol (XOPENEX) neb solution 0.63 mg  0.63 mg Nebulization Q4H PRN Andrea Knapp MD         sodium chloride 3 % neb solution 3 mL  3 mL Nebulization Q3H PRN Andrea Knapp MD         potassium chloride SA (K-DUR/KLOR-CON M) CR tablet 20-40 mEq  20-40 mEq Oral Q2H PRN Faisal Dolan  MD [place]         potassium chloride 10 mEq in 100 mL intermittent infusion  10 mEq Intravenous Q1H PRN Faisal Dolan MD         potassium chloride 10 mEq in 100 mL intermittent infusion with 10 mg lidocaine  10 mEq Intravenous Q1H PRN Faisal Dolan MD         potassium chloride 20 mEq in 50 mL intermittent infusion  20 mEq Intravenous Q90 MIN PRN Faisal Dolan MD         potassium chloride (KLOR-CON) Packet 20 mEq  20 mEq Oral or Feeding Tube BID PRN Richard Lock MD   20 mEq at 04/25/17 2058     benzocaine-menthol (CHLORASEPTIC) 6-10 MG lozenge 1 lozenge  1 lozenge Buccal Q1H PRN Jd Garcia MD   1 lozenge at 04/25/17 2317     metoprolol (LOPRESSOR) suspension 50 mg  50 mg Per J Tube BID Qi, Blas Acosta MD   50 mg at 04/25/17 2114     hyoscyamine (ANASPAZ/LEVSIN) tablet 125 mcg  125 mcg Oral TID Saul Rogers PA-C   125 mcg at 04/26/17 0940     loperamide (IMODIUM) liquid 2 mg  2 mg Per Feeding Tube 4x Daily PRN Justice Mohan MD   2 mg at 04/24/17 0102     loperamide (IMODIUM) liquid 2 mg  2 mg Per J Tube BID Alvin Dee MD   2 mg at 04/26/17 0939     ondansetron (ZOFRAN) solution 4-8 mg  4-8 mg Per J Tube Q6H PRN Jd Garcia MD         sennosides (SENOKOT) syrup 5 mL  5 mL Per J Tube BID Saul Rogers PA-C   5 mL at 04/24/17 0839     opium tincture 10 MG/ML (1%) liquid 6 mg  6 mg Oral Q6H Saul Rogers PA-C   6 mg at 04/26/17 1003     heparin sodium PF injection 5,000 Units  5,000 Units Subcutaneous Q8H Jd Garcia MD   5,000 Units at 04/26/17 1007     oxyCODONE (ROXICODONE) solution 10 mg  10 mg Oral or Feeding Tube Q4H Alvin Dee MD   10 mg at 04/26/17 0928     traZODone (DESYREL) suspension 25 mg  25 mg Oral At Bedtime PRN Alvin Dee MD   25 mg at 04/24/17 0033     dextrose 10 % 1,000 mL infusion   Intravenous Continuous PRN Saul Rogers, REJI          multivitamins with minerals (CERTAVITE/CEROVITE) liquid 15 mL  15 mL Per Feeding Tube Daily Saul Rogers PA-C   15 mL at 04/26/17 1003     ondansetron (ZOFRAN) injection 4 mg  4 mg Intravenous Q6H PRN Alvin Dee MD   4 mg at 04/21/17 1211     methocarbamol (ROBAXIN) tablet 500 mg  500 mg Oral 4x Daily Giorgi Husain, NP   500 mg at 04/26/17 0939     sodium chloride (PF) 0.9% PF flush 10-20 mL  10-20 mL Intracatheter Q1H PRN Alvin Dee MD   20 mL at 04/23/17 1349     heparin lock flush 10 UNIT/ML injection 5-10 mL  5-10 mL Intracatheter Q24H Alvin Dee MD   5 mL at 04/19/17 1517     heparin lock flush 10 UNIT/ML injection 5-10 mL  5-10 mL Intracatheter Q1H PRN Alvin Dee MD   5 mL at 04/21/17 1645     lidocaine 1 % 1 mL  1 mL Other Q1H PRN Alvin Dee MD   1 mL at 04/23/17 1818     lidocaine (LMX4) kit   Topical Q1H PRN Alvin Dee MD         sodium chloride (PF) 0.9% PF flush 3 mL  3 mL Intracatheter Q1H PRAlvin Mcallister MD         sodium chloride (PF) 0.9% PF flush 3 mL  3 mL Intracatheter Q8H Alvin Dee MD   3 mL at 04/23/17 0109     lidocaine (LIDODERM) 5 % Patch 1-2 patch  1-2 patch Transdermal Q24h Alvin Dee MD   1 patch at 04/25/17 2100    And     lidocaine (LIDODERM) patch REMOVAL   Transdermal Q24H Alvin Dee MD        And     lidocaine (LIDODERM) Patch in Place   Transdermal Q8H Alvin Dee MD         magnesium sulfate 4 g in 100 mL sterile water (premade)  4 g Intravenous Q4H PRN Sobia Scott APRN CNP         doxepin (SINEquan) 10 MG/ML (HIGH CONC) solution 3 mg  3 mg Oral At Bedtime Giorgi Husain NP   3 mg at 04/25/17 2102     magnesium sulfate 2 g in NS intermittent infusion ([employer]ar[employer] or [facility] Cmpd)  2 g Intravenous Daily PRN Sobia Scott APRN CNP 50 mL/hr at 04/21/17 1751 2 g at 04/23/17 1111     pantoprazole (PROTONIX) suspension 40 mg  40 mg Oral or Feeding Tube Daily Andrea Knapp MD   40 mg at 04/26/17  0938     acetaminophen (TYLENOL) solution 975 mg  975 mg Oral Q8H Giorgi Husain NP   975 mg at 04/26/17 0444     docusate (COLACE) 50 MG/5ML liquid 100 mg  100 mg Per J Tube At Bedtime Alvin Dee MD   100 mg at 04/20/17 2017     phenol (CHLORASEPTIC) 1.4 % spray 1 mL  1 spray Mouth/Throat Q1H PRN Sobia Scott APRN CNP   1 mL at 04/22/17 2216     glucose 40 % gel 15-30 g  15-30 g Oral Q15 Min PRN Arslan Wisdom MD        Or     dextrose 50 % injection 25-50 mL  25-50 mL Intravenous Q15 Min PRN Arslan Wisdom MD   25 mL at 04/21/17 1528    Or     glucagon injection 1 mg  1 mg Subcutaneous Q15 Min PRN Arslan Wisdom MD         naloxone (NARCAN) injection 0.1-0.4 mg  0.1-0.4 mg Intravenous Q2 Min PRN Arslan Wisdom MD         insulin aspart (NovoLOG) inj (RAPID ACTING)  1-6 Units Subcutaneous Q4H Arslan Wisdom MD   1 Units at 04/22/17 0038     prochlorperazine (COMPAZINE) injection 5 mg  5 mg Intravenous Q6H PRN Arslan Wisdom MD        Or     prochlorperazine (COMPAZINE) tablet 5 mg  5 mg Oral Q6H PRN Arslan Wisdom MD        Or     prochlorperazine (COMPAZINE) Suppository 12.5 mg  12.5 mg Rectal Q12H PRN Arslan Wisdom MD         bupivacaine 0.25 % - EPINEPHrine 1:200,000 injection    PRN Steven Perez MD   20 mL at 01/21/17 1255     No current [facility]-ordered outpatient prescriptions on file.           HOME/PREVIOUS MEDICATIONS:   Prior to Admission medications    Medication Sig Start Date End Date Taking? Authorizing Provider   DiphenhydrAMINE HCl (BENADRYL PO) Take 25 mg by mouth nightly as needed   Yes Unknown, Entered By History   ACETAMINOPHEN PO Take 500-1,000 mg by mouth every 8 hours as needed for pain   Yes Unknown, Entered By History   OXYCODONE HCL PO Take 5-10 mg by mouth every 6 hours as needed   Yes Unknown, Entered By History   cholestyramine (QUESTRAN) 4 G Packet Take 1 packet by mouth daily   Yes Unknown, Entered By History   multivitamins with minerals  (CERTAVITE/CEROVITE) LIQD liquid Take 15 mLs by mouth daily   Yes Unknown, Entered By History   doxepin (SILENOR) 3 MG tablet Take 1 tablet (3 mg) by mouth nightly as needed for sleep 4/10/17  Yes Sarina Serna APRN CNS   nystatin POWD Apply to peristomal skin with each pouch change until rash is resolved 2/27/17  Yes Sarina Serna APRN CNS   metoprolol (LOPRESSOR) 50 MG tablet 50 mg BID.  May crush, mix with water and administer via feeding tube  Patient taking differently: 25 mg by Per G Tube route 2 times daily May crush, mix with water and administer via feeding tube 2/6/17  Yes Tasia Rivera APRN CNS   Zinc Sulfate 220 (50 ZN) MG TABS 220 mg daily.  OK to crush, mix with water and administer via feeding tube. 2/6/17  Yes Tasia Rivera APRN CNS   traZODone (DESYREL) 100 MG tablet 1 tablet (100 mg) by Per G Tube route At Bedtime 1/25/17  Yes Saul Rogers PA-C   loperamide (IMODIUM) 1 MG/5ML liquid Take 20 mLs (4 mg) by mouth 4 times daily as needed for diarrhea  Patient taking differently: Take 4 mg by mouth 2 times daily  1/25/17  Yes Saul Rogers PA-C   Lactobacillus Rhamnosus, GG, (CULTURELLE PO) Take 1 tablet by mouth 2 times daily    Yes Reported, Patient   warfarin (COUMADIN) 2.5 MG tablet Take 1 tablet (2.5 mg) by mouth daily  Patient taking differently: Take 1.25 mg by mouth on Wednesday and take 2.5 mg by mouth on all other days of the week. 2/22/17   Sandra John APRN CNP         ALLERGIES:    Allergies   Allergen Reactions     Amoxicillin Diarrhea     Ativan [Lorazepam] Other (See Comments)     Hallucinations     Hydromorphone Itching     4/12/17 - patient open to using this as she tolerated Hydromorphone PCA during hospitalization in January 2017.      Morphine Itching            PAST MEDICAL AND PSYCHIATRIC HISTORY:    Past Medical History:   Diagnosis Date     Atrial fibrillation (H)      Breast cancer (H) 2007    right side -  lumpectomy, chemo, and local radiation     Esophageal perforation      Fibromyalgia      GERD (gastroesophageal reflux disease)      Hiatal hernia      History of blood transfusion      IBS (irritable bowel syndrome)      Meniere's disease     deaf left ear     MS (multiple sclerosis) (H)            PAST SURGICAL HISTORY:   Past Surgical History:   Procedure Laterality Date     APPENDECTOMY       BACK SURGERY  5/1/2015    lumbar fusion     BRONCHOSCOPY FLEXIBLE N/A 4/17/2017    Procedure: BRONCHOSCOPY FLEXIBLE;;  Surgeon: Jens Wise MD;  Location: [facility]     C GASTROSTOMY/JEJUN TUBE      J tube for feedings     CLOSE SPIT FISTULA N/A 4/17/2017    Procedure: CLOSE SPIT FISTULA;;  Surgeon: Jens Wise MD;  Location[facility]     COMBINED ESOPHAGOSCOPY, GASTROSCOPY, DUODENOSCOPY (EGD), REMOVE ESOPHAGEAL STENT N/A 8/26/2016    Procedure: COMBINED ESOPHAGOSCOPY, GASTROSCOPY, DUODENOSCOPY (EGD), REMOVE ESOPHAGEAL STENT;  Surgeon: Jens Wise MD;  Location[facility]     CREATE SPIT FISTULA N/A 1/9/2017    Procedure: CREATE SPIT FISTULA;  Surgeon: Jens Wise MD;  [facility]     ESOPHAGECTOMY N/A 1/9/2017    Procedure: ESOPHAGECTOMY;  Surgeon: Jens Wise MD;  [facility]     ESOPHAGOSCOPY, GASTROSCOPY, DUODENOSCOPY (EGD), COMBINED N/A 4/18/2016    Procedure: COMBINED ESOPHAGOSCOPY, GASTROSCOPY, DUODENOSCOPY (EGD);  Surgeon: Alexis Barraza MD;  [facility]     ESOPHAGOSCOPY, GASTROSCOPY, DUODENOSCOPY (EGD), COMBINED N/A 4/25/2016    Procedure: COMBINED ESOPHAGOSCOPY, GASTROSCOPY, DUODENOSCOPY (EGD);  Surgeon: Alexis Barraza MD;  [facility]     ESOPHAGOSCOPY, GASTROSCOPY, DUODENOSCOPY (EGD), COMBINED N/A 5/4/2016    Procedure: COMBINED ESOPHAGOSCOPY, GASTROSCOPY, DUODENOSCOPY (EGD);  Surgeon: Alexis Barraza MD;  [facility]     ESOPHAGOSCOPY, GASTROSCOPY, DUODENOSCOPY (EGD), COMBINED N/A 5/18/2016     Procedure: COMBINED ESOPHAGOSCOPY, GASTROSCOPY, DUODENOSCOPY (EGD);  Surgeon: Alexis Barraza MD;  [facility]     ESOPHAGOSCOPY, GASTROSCOPY, DUODENOSCOPY (EGD), COMBINED N/A 6/22/2016    Procedure: COMBINED ESOPHAGOSCOPY, GASTROSCOPY, DUODENOSCOPY (EGD);  Surgeon: Alexis Barraza MD;  [facility]     ESOPHAGOSCOPY, GASTROSCOPY, DUODENOSCOPY (EGD), COMBINED N/A 7/12/2016    Procedure: COMBINED ESOPHAGOSCOPY, GASTROSCOPY, DUODENOSCOPY (EGD);  Surgeon: Jens Wise MD;  [facility]     ESOPHAGOSCOPY, GASTROSCOPY, DUODENOSCOPY (EGD), COMBINED N/A 4/21/2017    Procedure: COMBINED ESOPHAGOSCOPY, GASTROSCOPY, DUODENOSCOPY (EGD);  Esophagogastroduodenoscopy, Pharyngostomy Tube Placement ;  Surgeon: Jens Wise MD;  [facility]     ESOPHAGOSCOPY, GASTROSCOPY, DUODENOSCOPY (EGD), DILATATION, COMBINED N/A 6/29/2016    Procedure: COMBINED ESOPHAGOSCOPY, GASTROSCOPY, DUODENOSCOPY (EGD), DILATATION;  Surgeon: Jens Wise MD;  [facility]     HC UGI ENDOSCOPY W TRANSENDOSCOPIC STENT PLACEMENT N/A 7/12/2016    Procedure: COMBINED ESOPHAGOSCOPY, GASTROSCOPY, DUODENOSCOPY (EGD), PLACE TRANSENDOSCOPIC ESOPHAGEAL STENT;  Surgeon: Jens Wise MD;  [facility]     HC UGI ENDOSCOPY W TRANSENDOSCOPIC STENT PLACEMENT N/A 7/22/2016    Procedure: COMBINED ESOPHAGOSCOPY, GASTROSCOPY, DUODENOSCOPY (EGD), PLACE TRANSENDOSCOPIC ESOPHAGEAL STENT;  Surgeon: Jens Wise MD;  [facility]     IRRIGATION AND DEBRIDEMENT CHEST WASHOUT, COMBINED N/A 6/29/2016    Procedure: COMBINED IRRIGATION AND DEBRIDEMENT CHEST WASHOUT;  Surgeon: Jens Wise MD;  [facility]     LAPAROSCOPIC ASSISTED INSERTION TUBE JEJUNOSTOMY N/A 4/17/2017    Procedure: LAPAROSCOPIC ASSISTED INSERTION TUBE JEJUNOSTOMY;;  Surgeon: Jens Wise MD;  [facility]     LAPAROSCOPY DIAGNOSTIC (GENERAL) N/A 1/9/2017    Procedure:  "LAPAROSCOPY DIAGNOSTIC (GENERAL);  Surgeon: Jens Wise MD;  [facility]     LAPAROSCOPY DIAGNOSTIC (GENERAL) N/A 4/17/2017    Procedure: LAPAROSCOPY DIAGNOSTIC (GENERAL);  Laparoscopic , Neck Dissection, Spit Fistula Takedown, Laparoscopic Jejunostomy Tube and Pharyngostomy Tube, Gastric Pull up, Upper Endoscopy(EGD)  , Flexible Bronchoscopy ,Sternotomy ;  Surgeon: Jens Wise MD;  [facility]     LUMPECTOMY BREAST Right 2007     NERVE BLOCK PERIPHERAL N/A 8/30/2016    Procedure: NERVE BLOCK PERIPHERAL;  Surgeon: GENERIC ANESTHESIA PROVIDER;  Location: [facility]     NISSEN FUNDOPLICATION  1/2016     PHARYNGOSTOMY N/A 4/18/2016    Procedure: PHARYNGOSTOMY;  Surgeon: Alexis Barraza MD;  [facility]     PHARYNGOSTOMY N/A 4/25/2016    Procedure: PHARYNGOSTOMY;  Surgeon: Alexis Barraza MD;  [facility]     PHARYNGOSTOMY N/A 5/4/2016    Procedure: PHARYNGOSTOMY;  Surgeon: Alexis Barraza MD;  [facility]     PHARYNGOSTOMY N/A 6/22/2016    Procedure: PHARYNGOSTOMY;  Surgeon: Alexis Barraza MD;  [facility]     PHARYNGOSTOMY N/A 6/29/2016    Procedure: PHARYNGOSTOMY;  Surgeon: Jens Wise MD;  [facility]     PHARYNGOSTOMY N/A 4/21/2017    Procedure: PHARYNGOSTOMY;;  Surgeon: Jens Wise MD;  [facility]     PICC INSERTION Left 8/25/2016    5fr DL BioFlo PICC, 42cm (3cm external) in the L medial brachial vein w/ tip in the SVC RA junction.     THORACOTOMY Right 1998    lung infection - \"hard crust formed on lung\"     THORACOTOMY Left 1/9/2017    Procedure: THORACOTOMY;  Surgeon: Jens Wise MD;  Location[facility]     WRIST SURGERY             FAMILY HISTORY: family history includes Alzheimer Disease in her mother; Breast Cancer in her daughter; CEREBROVASCULAR DISEASE in her father; Ovarian Cancer in her sister.      HEALTH & LIFESTYLE PRACTICES:   Tobacco:  reports that she quit " smoking about 19 years ago. Her smoking use included Cigarettes. She has a 15.00 pack-year smoking history. She does not have any smokeless tobacco history on file.  Alcohol:  reports that she does not drink alcohol.  Illicit drugs:  reports that she does not use illicit drugs.       SOCIAL HISTORY:  Lives in [place], [place]      LABORATORY VALUES:   Last Basic Metabolic Panel:  Lab Results   Component Value Date     04/26/2017      Lab Results   Component Value Date    POTASSIUM 3.7 04/26/2017     Lab Results   Component Value Date    CHLORIDE 105 04/26/2017     Lab Results   Component Value Date    ANNABELLE 7.8 04/26/2017     Lab Results   Component Value Date    CO2 26 04/26/2017     Lab Results   Component Value Date    BUN 13 04/26/2017     Lab Results   Component Value Date    CR 0.36 04/26/2017     Lab Results   Component Value Date     04/26/2017       CBC results:  Lab Results   Component Value Date    WBC 13.5 04/26/2017     Lab Results   Component Value Date    HGB 7.1 04/26/2017     Lab Results   Component Value Date    HCT 21.9 04/26/2017     Lab Results   Component Value Date     04/26/2017       LFT results:  Lab Results   Component Value Date    AST 38 04/17/2017     Lab Results   Component Value Date    ALT 56 04/17/2017     Lab Results   Component Value Date    ALKPHOS 283 04/17/2017         Lab Results   Component Value Date    ALBUMIN 3.6 04/17/2017         Lab Results   Component Value Date    INR 1.92 04/19/2017       Labs above reviewed as well as additional relevant diagnostic studies from the EPIC record.       PHYSICAL EXAMINATION:  VITAL SIGNS:  B/P: 106/54, T: 97.3, P: 87, R: 18    CONSTITUTIONAL/GENERAL APPEARANCE: Alert and Oriented x3  HEAD:Normocephalic  NECK: free range of motion, incision left side of neck well opposed.  ENT: moist mucous membranes  EYES: PERRLA and Pupils 4mm  PULMONARY:non-labored  CHEST WALL:symetrical chest wall expansion  CARDIOVASCULAR:acyanotic and  peripheral pulses +2 all extremities  ABDOMINAL:flat, non-tender to palpation, incisions well opposed, no erythema or drainage.   MUSCULOSKELETAL/BACK/SPINE/EXTREMITIES: gross motor function intact all extremities, left lower extremity, tenderness to upper anterior quadriceps with palpation, no pain with passive range of motion of hip.    GAIT: not assessed, patient resting in bed  NEURO:  SILT all extremities, no allodynia to abdomen or neck/anterior chest  SKIN:  normal, warm, dry  PSYCHIATRIC/BEHAVIORAL/OBSERVATIONS:     Judgment/Insight -intact   Orientation - oriented x 3   Memory - intact   Mood and affect - appropriate    TIME SPENT: 60 minutes including 45 minutes of face-to-face time counseling her  about her pain management treatment options, and coordinating care with the primary team.    Sundar Dee CNP  April 26, 2017, 10:22 AM  Inpatient Pain Management Service

## 2017-04-26 NOTE — PROGRESS NOTES
Care Coordinator- Discharge Planning     Admission Date/Time:  4/17/2017  Attending MD:  Andrea Knapp*     Data  Date of initial CC assessment:  4/21/2017  Chart reviewed, discussed with interdisciplinary team.   Patient was admitted for:   1. Hx of esophagectomy    2. Esophageal perforation    3. On warfarin therapy         Assessment  Full assessment completed in previous note    Coordination of Care and Referrals: Provided patient/family with options for DME.    Met with patient to discuss discharge planning.  PT/OT now recommending home with assist and home PT/OT.  Patient will discharge to home on resumption of tube feedings via J tube.  Patient is open to [facility](P: [phone], F: [phone]) and [facility](P: [phone], F: [phone]) for skilled nursing visits and PT/OT.  Resumption orders placed and [facility] Mckinley liaison, updated, and [facility] liaison, updated.  MD team recommending patient has access to yaunkauer suction at home.  Patient offered choice of DME companies and patient had no preference.  [facility](P: [phone], F: [phone]) has this item in stock.  Orders sent to [facility], patient will have coverage for the suction through her insurance.  CC will need to update [facility] when the patient is ready for discharge so they can plan delivery of the product.  CC will continue to follow and assist with discharge planning as needed.            Plan  Anticipated Discharge Date: 1-2 days  Anticipated Discharge Plan:  Home with resumption of services and [facility] for DME    Sarina Orellana, RNCC  [phone]

## 2017-04-26 NOTE — PLAN OF CARE
Problem: Goal Outcome Summary  Goal: Goal Outcome Summary  ST 7B: Recommend continue clear liquid diet and advance as appropriate per thoracic team. Pt to sit upright for all PO intake, take small bites/sips and tuck chin with each swallow. Intake limited due to pain. Training of pharyngeal strengthening exercises initiated this date. Will follow. Do not anticipate need for ongoing ST intervention at discharge.

## 2017-04-26 NOTE — PLAN OF CARE
Problem: Goal Outcome Summary  Goal: Goal Outcome Summary  Outcome: No Change  Pt continues to have L sided abdominal pain; g-tube site tender with scant drainage. Up to commode with loose stools. At beginning of shift mouth, tongue were burning from bile reflux.  notified and Zantac ordered. In afternoon pt noticed that she was not having reflux problems. Dilaudid PCA discontinued. Oxycodone given on schedule 1/2 hour prior to stopping PCA. No prn Oxycodone given. K+ replacement started.  notified of increased edema in lower extremities and he will be coming to see pt this afternoon. Pt has generalized edema in back and hips. Lungs clear.

## 2017-04-26 NOTE — PROGRESS NOTES
CLINICAL NUTRITION SERVICES - BRIEF NOTE     Nutrition Prescription    RECOMMENDATIONS FOR MDs/PROVIDERS TO ORDER:  Alert RD when pt ready to transition back to cycled TF regimen: below recipe @ 80 mL/hr x 12 hours     Recommendations already ordered by Registered Dietitian (RD):  1. 1 pkt Nutrisource fiber BID  2. Continue continuous TF as ordered (2 cans Isosource 1.5 + 2 cans TwoCal HN @ 40 mL/hr).  Revised orders to adhere to 8-hour hang-time given open system TF administration --> Multiply current TF rate by 8 and hang that amount of TF formula in bag every 8 hours       - Pt reports tolerating TF okay, but not ready to cycle TF yet. C/o stomach cramping and diarrhea, but pt isn't sure if it's related to TF or medications or due to recent surgery. Discussed that adding Nutrisource fiber (source of soluble fiber) can help bulk stools, pt agreeable to trying this    INTERVENTIONS  Implementation  Enteral Nutrition - Adjust TF orders to have 4 hour hang-time as required with mixed TF bags  Medical food supplement therapy: add 1 pkt Nutrisource fiber BID (15 kcal and 3 g soluble fiber per packet) to help bulk loose stools    Monitoring/Evaluation  Progress toward goals will be monitored and evaluated per protocol.     Evie Montanez RD, LD   7B RD Pager: [phone]

## 2017-04-26 NOTE — PLAN OF CARE
Problem: Goal Outcome Summary  Goal: Goal Outcome Summary  Outcome: No Change  A&O, VSS ex tachy.  Scheduled oxycodone 10mg for abd pain and cramping. Chest tube and pharyngostomy tube removed today. Up to commode multiple times, able to make needs known, with small amounts of urine and stool mixed. NPO w/ ice chips. CXR at 1600.  here in afternoon and very helpful. Abd pain w/ meds. Int left groin pain, stiffness in lift leg at times. TF increased to 30ml/hr, increase TF to 40ml/hr @ 0100 if tolerable. Plan to d/c Friday. Cont w/ POC.    Pt now c/o acid reflux, green bile coming up, pt states it is burning her mouth.

## 2017-04-27 DIAGNOSIS — I48.0 PAROXYSMAL ATRIAL FIBRILLATION (H): ICD-10-CM

## 2017-04-27 LAB
ABO + RH BLD: NORMAL
ABO + RH BLD: NORMAL
ANION GAP SERPL CALCULATED.3IONS-SCNC: 7 MMOL/L (ref 3–14)
BLD GP AB SCN SERPL QL: NORMAL
BLD PROD TYP BPU: NORMAL
BLD PROD TYP BPU: NORMAL
BLD UNIT ID BPU: 0
BLOOD BANK CMNT PATIENT-IMP: NORMAL
BLOOD PRODUCT CODE: NORMAL
BPU ID: NORMAL
BUN SERPL-MCNC: 11 MG/DL (ref 7–30)
CALCIUM SERPL-MCNC: 7.7 MG/DL (ref 8.5–10.1)
CHLORIDE SERPL-SCNC: 104 MMOL/L (ref 94–109)
CO2 SERPL-SCNC: 26 MMOL/L (ref 20–32)
CREAT SERPL-MCNC: 0.41 MG/DL (ref 0.52–1.04)
ERYTHROCYTE [DISTWIDTH] IN BLOOD BY AUTOMATED COUNT: 17.4 % (ref 10–15)
GFR SERPL CREATININE-BSD FRML MDRD: >90 ML/MIN/1.7M2
GLUCOSE BLDC GLUCOMTR-MCNC: 111 MG/DL (ref 70–99)
GLUCOSE BLDC GLUCOMTR-MCNC: 118 MG/DL (ref 70–99)
GLUCOSE SERPL-MCNC: 96 MG/DL (ref 70–99)
HCT VFR BLD AUTO: 22.3 % (ref 35–47)
HGB BLD-MCNC: 6.8 G/DL (ref 11.7–15.7)
HGB BLD-MCNC: 9.9 G/DL (ref 11.7–15.7)
IRON SATN MFR SERPL: 15 % (ref 15–46)
IRON SERPL-MCNC: 44 UG/DL (ref 35–180)
MCH RBC QN AUTO: 26.5 PG (ref 26.5–33)
MCHC RBC AUTO-ENTMCNC: 30.5 G/DL (ref 31.5–36.5)
MCV RBC AUTO: 87 FL (ref 78–100)
NUM BPU REQUESTED: 1
PLATELET # BLD AUTO: 455 10E9/L (ref 150–450)
POTASSIUM SERPL-SCNC: 3.8 MMOL/L (ref 3.4–5.3)
RBC # BLD AUTO: 2.57 10E12/L (ref 3.8–5.2)
SODIUM SERPL-SCNC: 137 MMOL/L (ref 133–144)
SPECIMEN EXP DATE BLD: NORMAL
TIBC SERPL-MCNC: 302 UG/DL (ref 240–430)
TRANSFERRIN SERPL-MCNC: 139 MG/DL (ref 210–360)
TRANSFUSION STATUS PATIENT QL: NORMAL
TRANSFUSION STATUS PATIENT QL: NORMAL
WBC # BLD AUTO: 11.3 10E9/L (ref 4–11)

## 2017-04-27 PROCEDURE — A9270 NON-COVERED ITEM OR SERVICE: HCPCS | Mod: GY | Performed by: NURSE PRACTITIONER

## 2017-04-27 PROCEDURE — 25000128 H RX IP 250 OP 636: Performed by: STUDENT IN AN ORGANIZED HEALTH CARE EDUCATION/TRAINING PROGRAM

## 2017-04-27 PROCEDURE — 85027 COMPLETE CBC AUTOMATED: CPT | Performed by: STUDENT IN AN ORGANIZED HEALTH CARE EDUCATION/TRAINING PROGRAM

## 2017-04-27 PROCEDURE — A9270 NON-COVERED ITEM OR SERVICE: HCPCS | Mod: GY | Performed by: STUDENT IN AN ORGANIZED HEALTH CARE EDUCATION/TRAINING PROGRAM

## 2017-04-27 PROCEDURE — 85018 HEMOGLOBIN: CPT | Performed by: STUDENT IN AN ORGANIZED HEALTH CARE EDUCATION/TRAINING PROGRAM

## 2017-04-27 PROCEDURE — A9270 NON-COVERED ITEM OR SERVICE: HCPCS | Mod: GY | Performed by: PHYSICIAN ASSISTANT

## 2017-04-27 PROCEDURE — A9270 NON-COVERED ITEM OR SERVICE: HCPCS | Mod: GY | Performed by: SURGERY

## 2017-04-27 PROCEDURE — 25000132 ZZH RX MED GY IP 250 OP 250 PS 637: Mod: GY | Performed by: SURGERY

## 2017-04-27 PROCEDURE — 00000146 ZZHCL STATISTIC GLUCOSE BY METER IP

## 2017-04-27 PROCEDURE — 83540 ASSAY OF IRON: CPT | Performed by: STUDENT IN AN ORGANIZED HEALTH CARE EDUCATION/TRAINING PROGRAM

## 2017-04-27 PROCEDURE — 86923 COMPATIBILITY TEST ELECTRIC: CPT | Performed by: STUDENT IN AN ORGANIZED HEALTH CARE EDUCATION/TRAINING PROGRAM

## 2017-04-27 PROCEDURE — 12000008 ZZH R&B INTERMEDIATE UMMC

## 2017-04-27 PROCEDURE — 36415 COLL VENOUS BLD VENIPUNCTURE: CPT | Performed by: STUDENT IN AN ORGANIZED HEALTH CARE EDUCATION/TRAINING PROGRAM

## 2017-04-27 PROCEDURE — 83550 IRON BINDING TEST: CPT | Performed by: STUDENT IN AN ORGANIZED HEALTH CARE EDUCATION/TRAINING PROGRAM

## 2017-04-27 PROCEDURE — 40000141 ZZH STATISTIC PERIPHERAL IV START W/O US GUIDANCE

## 2017-04-27 PROCEDURE — 84466 ASSAY OF TRANSFERRIN: CPT | Performed by: STUDENT IN AN ORGANIZED HEALTH CARE EDUCATION/TRAINING PROGRAM

## 2017-04-27 PROCEDURE — 25000132 ZZH RX MED GY IP 250 OP 250 PS 637: Mod: GY | Performed by: PHYSICIAN ASSISTANT

## 2017-04-27 PROCEDURE — 25000132 ZZH RX MED GY IP 250 OP 250 PS 637: Mod: GY | Performed by: NURSE PRACTITIONER

## 2017-04-27 PROCEDURE — 86850 RBC ANTIBODY SCREEN: CPT | Performed by: STUDENT IN AN ORGANIZED HEALTH CARE EDUCATION/TRAINING PROGRAM

## 2017-04-27 PROCEDURE — 86901 BLOOD TYPING SEROLOGIC RH(D): CPT | Performed by: STUDENT IN AN ORGANIZED HEALTH CARE EDUCATION/TRAINING PROGRAM

## 2017-04-27 PROCEDURE — P9016 RBC LEUKOCYTES REDUCED: HCPCS | Performed by: STUDENT IN AN ORGANIZED HEALTH CARE EDUCATION/TRAINING PROGRAM

## 2017-04-27 PROCEDURE — 80048 BASIC METABOLIC PNL TOTAL CA: CPT | Performed by: SURGERY

## 2017-04-27 PROCEDURE — 25000132 ZZH RX MED GY IP 250 OP 250 PS 637: Mod: GY | Performed by: STUDENT IN AN ORGANIZED HEALTH CARE EDUCATION/TRAINING PROGRAM

## 2017-04-27 PROCEDURE — 86900 BLOOD TYPING SEROLOGIC ABO: CPT | Performed by: STUDENT IN AN ORGANIZED HEALTH CARE EDUCATION/TRAINING PROGRAM

## 2017-04-27 PROCEDURE — 99207 ZZC NO CHARGE VISIT/PATIENT NOT SEEN: CPT | Performed by: NURSE PRACTITIONER

## 2017-04-27 RX ORDER — FUROSEMIDE 10 MG/ML
20 INJECTION INTRAMUSCULAR; INTRAVENOUS ONCE
Status: COMPLETED | OUTPATIENT
Start: 2017-04-27 | End: 2017-04-27

## 2017-04-27 RX ORDER — WARFARIN SODIUM 2.5 MG/1
2.5 TABLET ORAL DAILY
Qty: 30 TABLET | Refills: 1 | Status: ON HOLD | OUTPATIENT
Start: 2017-04-27 | End: 2017-06-29

## 2017-04-27 RX ADMIN — RANITIDINE HYDROCHLORIDE 150 MG: 15 SOLUTION ORAL at 20:41

## 2017-04-27 RX ADMIN — ACETAMINOPHEN 975 MG: 325 SOLUTION ORAL at 04:29

## 2017-04-27 RX ADMIN — MULTIVITAMIN 15 ML: LIQUID ORAL at 09:56

## 2017-04-27 RX ADMIN — GABAPENTIN 300 MG: 250 SOLUTION ORAL at 13:53

## 2017-04-27 RX ADMIN — ENOXAPARIN SODIUM 50 MG: 60 INJECTION SUBCUTANEOUS at 20:36

## 2017-04-27 RX ADMIN — METHOCARBAMOL 500 MG: 500 TABLET ORAL at 21:30

## 2017-04-27 RX ADMIN — FUROSEMIDE 20 MG: 10 INJECTION, SOLUTION INTRAVENOUS at 16:58

## 2017-04-27 RX ADMIN — OXYCODONE HYDROCHLORIDE 10 MG: 5 SOLUTION ORAL at 07:51

## 2017-04-27 RX ADMIN — OXYCODONE HYDROCHLORIDE 10 MG: 5 SOLUTION ORAL at 17:58

## 2017-04-27 RX ADMIN — OXYCODONE HYDROCHLORIDE 10 MG: 5 SOLUTION ORAL at 16:04

## 2017-04-27 RX ADMIN — HYOSCYAMINE SULFATE 125 MCG: 0.12 TABLET ORAL at 20:19

## 2017-04-27 RX ADMIN — LOPERAMIDE HYDROCHLORIDE 2 MG: 1 SOLUTION ORAL at 07:54

## 2017-04-27 RX ADMIN — METHOCARBAMOL 500 MG: 500 TABLET ORAL at 13:53

## 2017-04-27 RX ADMIN — OXYCODONE HYDROCHLORIDE 10 MG: 5 SOLUTION ORAL at 20:27

## 2017-04-27 RX ADMIN — Medication 6 MG: at 16:04

## 2017-04-27 RX ADMIN — OXYCODONE HYDROCHLORIDE 5 MG: 5 SOLUTION ORAL at 09:56

## 2017-04-27 RX ADMIN — FUROSEMIDE 20 MG: 10 INJECTION, SOLUTION INTRAVENOUS at 10:13

## 2017-04-27 RX ADMIN — SIMETHICONE 40 MG: 20 SUSPENSION/ DROPS ORAL at 10:37

## 2017-04-27 RX ADMIN — HYOSCYAMINE SULFATE 125 MCG: 0.12 TABLET ORAL at 13:53

## 2017-04-27 RX ADMIN — LIDOCAINE 2 PATCH: 50 PATCH TOPICAL at 20:44

## 2017-04-27 RX ADMIN — METOPROLOL TARTRATE 50 MG: 100 TABLET, FILM COATED ORAL at 20:39

## 2017-04-27 RX ADMIN — OXYCODONE HYDROCHLORIDE 10 MG: 5 SOLUTION ORAL at 04:29

## 2017-04-27 RX ADMIN — ACETAMINOPHEN 975 MG: 325 SOLUTION ORAL at 20:27

## 2017-04-27 RX ADMIN — METHOCARBAMOL 500 MG: 500 TABLET ORAL at 10:00

## 2017-04-27 RX ADMIN — PANTOPRAZOLE SODIUM 40 MG: 40 TABLET, DELAYED RELEASE ORAL at 07:51

## 2017-04-27 RX ADMIN — LOPERAMIDE HYDROCHLORIDE 2 MG: 1 SOLUTION ORAL at 20:56

## 2017-04-27 RX ADMIN — METHOCARBAMOL 500 MG: 500 TABLET ORAL at 17:53

## 2017-04-27 RX ADMIN — Medication 6 MG: at 21:30

## 2017-04-27 RX ADMIN — GABAPENTIN 300 MG: 250 SOLUTION ORAL at 21:30

## 2017-04-27 RX ADMIN — GABAPENTIN 300 MG: 250 SOLUTION ORAL at 06:23

## 2017-04-27 RX ADMIN — Medication 6 MG: at 03:00

## 2017-04-27 RX ADMIN — DOXEPIN HYDROCHLORIDE 3 MG: 10 SOLUTION ORAL at 21:30

## 2017-04-27 RX ADMIN — HYOSCYAMINE SULFATE 125 MCG: 0.12 TABLET ORAL at 10:00

## 2017-04-27 RX ADMIN — OXYCODONE HYDROCHLORIDE 10 MG: 5 SOLUTION ORAL at 12:48

## 2017-04-27 RX ADMIN — ACETAMINOPHEN 975 MG: 325 SOLUTION ORAL at 12:19

## 2017-04-27 RX ADMIN — OXYCODONE HYDROCHLORIDE 10 MG: 5 SOLUTION ORAL at 00:44

## 2017-04-27 RX ADMIN — RANITIDINE HYDROCHLORIDE 150 MG: 15 SOLUTION ORAL at 10:27

## 2017-04-27 RX ADMIN — Medication 0.5 PACKET: at 10:27

## 2017-04-27 RX ADMIN — ENOXAPARIN SODIUM 50 MG: 60 INJECTION SUBCUTANEOUS at 10:12

## 2017-04-27 RX ADMIN — Medication 6 MG: at 10:10

## 2017-04-27 ASSESSMENT — PAIN DESCRIPTION - DESCRIPTORS: DESCRIPTORS: CRAMPING

## 2017-04-27 NOTE — PLAN OF CARE
Problem: Goal Outcome Summary  Goal: Goal Outcome Summary  Outcome: No Change  Up to commode independently but needs assist getting back to bed because of edema in legs. Lasix 20mg given with improvement in lower legs but ankles and feet still very swollen. Hgb 6.8 (notified by lab). One unit packed cells started about 1430 (IV infiltrated at beginning of transfusion). VSS stable with 10 minute check.  Lungs clear. Appears to be having less bile reflux but still a problem. Oxycodone partially effective for abdominal cramping pain.  Took only one additional 5mg prn Oxycodone dose this shift. Scheduled doses every 4 hours.

## 2017-04-27 NOTE — PLAN OF CARE
"Problem: Individualization  Goal: Patient Preferences  Afeb, vitals stable, 02 sats 90-97% on room air, states pain is \"OK\", cont on scheduled oxycodone with relief, abd incisions dry, intact and stapled, belly round with positive bowel sounds, cont to c/o bile reflux, using yankauer suction prn, up to commode with sba, voiding in adequate amts, tube feeds running at 40cc/hour, lungs diminished, glucoses 111 and 118, no coverage needed, bilat LE swollen, cms intact with positive pedal pulses, denies N/T, appeared to rest/sleep between cares,       "

## 2017-04-27 NOTE — PROGRESS NOTES
THORACIC & FOREGUT SURGERY    S:  No acute overnight events.  Pt seen at bedside resting comfortably. Reports continued pain, some better control after adjustment of regimen per pain team recs. Ambulating, tolerating CLD. Continues to report reflux at night. BMs overnight.    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       - Transfuse 1u pRBC for anemia  - Appreciate pain team recs, pain regimen adjusted accordingly  - Lasix for diuresis  - On TFs, will keep continuous rate and hold off cycling due to nighttime reflux. Encouraged to minimize PO intake at night to reduce reflux symptoms.  - Likely DC to home in 1-2 days if doing well    Discussed with fellow, Dr. Whitlock.    Blas Dominguez MD  PGY-1 Thoracic Surgery  [facility]

## 2017-04-27 NOTE — PLAN OF CARE
Problem: Goal Outcome Summary  Goal: Goal Outcome Summary  Outcome: No Change  [id number]: A&Ox4, VSS on RA. C/o pain and cramping in abdomen that was moderately controlled with scheduled Tylenol and Oxycodone. Denies nausea. Jtube patent w/ TF infusing @ 40mL/hr. HS , covered per SS. Incisions on chest c/d/i. Pt continues with BLE edema. Up with SBA to BSC, voiding spontaneously. PIV patent and SL'ed. Continue to monitor and follow POC.

## 2017-04-27 NOTE — PLAN OF CARE
Problem: Goal Outcome Summary  Goal: Goal Outcome Summary  Outcome: No Change  VSS. Afebrile. TF at 40ml/hr, tolerating well. BLE continue w/+2, +3 edema, ordered IV lasix administered as ordered. Pt up w/SBA to bedside commode. PIV replaced, K replacement infusing at this time, recheck tomorrow. Incisions c/d/i to chest. Jtube side cleansed, erythema at site, barrier cream applied. Cont POC.

## 2017-04-27 NOTE — PROGRESS NOTES
CLINICAL NUTRITION SERVICES - BRIEF NOTE     Nutrition Prescription    Recommendations already ordered by Registered Dietitian (RD):  ADDENDUM 4/27 @ noon: spoke with team who request keep TF continuous due to pt's issues with reflux.  RD changed back TF orders to continuous as was previously ordered    (CHANGED, see above)Start cycling TF.  Adv TF rate by 10 mL q4h as tolerated to new goal rate 60 mL/hr.  Once goal tolerated x 4 hours, begin 16-hour cycled schedule of TF @ 60 mL/hr from 4 PM - 8 AM (960 mL/day).  Continue current recipe (2 cans Isosource 1.5 + 2 cans TwoCal HN).  Goal cycledregimen provides 1676 kcal (41 kcal/kg) and 73 g PRO (1.8 g/kg) per dosing weight 41 kg  -- Continue adhering to 8-hour hang-time.  Continue 1 pkt Nutrisource fiber BID       - Diet: Clear liquids. Continuous TF (2 cans TwoCal HN + 2 cans Isosource 1.5) @ 40 mL/hr + 30 mL q4h free water flushes    - Spoke with pt about cycling TF, pt agreeable to try cycling today as she may be discharging tomorrow and was on cycled TF at home (she says she has had difficulty getting TF rate > 70 mL/hr at home in the past).  Pt comfortable with goal rate 60 mL/hr and cycling it for ~16 hours hours.      - Nutrisource fiber was started yesterday, pt unsure if it has improved her symptoms yet as she received her first dose this morning.  Reports did have a large amount of diarrhea yesterday and a little again this morning    INTERVENTIONS  Implementation  Collaboration with other providers: paged team about cycling TF; team called back and request keep pt's TF continuous due to reflux issues. Discussed current plan to continue continuous TF with RN  Enteral Nutrition - Modify rate and schedule; ADDENDUM: changed orders back to continuous  Nutrition education for recommended modifications    Monitoring/Evaluation  Progress toward goals will be monitored and evaluated per protocol.     Evie Montanez, RD, LD   [address]B RD Pager: [phone]

## 2017-04-27 NOTE — PROGRESS NOTES
Inpatient Pain Management Service: Follow Up Note    The inpatient pain service is continuing to follow Minerva Blanco for her acute on chronic pain at the request of her hospital team.  Patient not seen today, following plan is based upon conversation with primary team and chart review.    Outpatient medications related to pain prior to admission:   --oxycodone 5-10 mg PO Q 6 hours PRN (patient reported using 30-40 mg per day on average).   Outpatient opioids prescribed by: LAUREL Telles  PRIMARY CARE PROVIDER: Andrea Nino  PAIN SPECIALIST: NONE   [place] [facility] database reviewed: reviewed    RECOMMENDATIONS/PLAN:   1. Continue oxycodone liquid 10 mg via FT Q 4 hours scheduled x 24 hours then    Tomorrow change to oxycodone liquid 10 mg via FT Q 3 hours PRN    Upon discharge change to oxycodone liquid 5-10 mg via FT Q 4 hours PRN x 7 days    then taper by changing to Q 6 hours PRN x 2 days,     then change to Q 8 hours PRN x 2 days,     then change to Q 12 hours PRN x 2 days,     then change to Q daily PRN x 2 days then off.   2. Change oxycodone liquid to 5-10 mg via FT Q 4 hours PRN, x 24 hours, and Discontinue tomorrow.   3. Discontinue IV hydromorphone today.    4. Continue gabapentin liquid to 300 mg via FT Q 8 hours    Continue until patient tapered off opioids then taper gabapentin by 300mg total daily dose every 2 days until off.   5. Continue to monitor LFTs while on scheduled acetaminophen. Pending LFTs continue acetaminophen liquid 975 mg via FT Q 8 hours.     Upon discharge continue acetaminophen 975 mg via FT Q 8 hours x 7 days then change to PRN.  6. Defer use of NSAID to Thoracic Surgery.  7. Continue simethicone liquid 40 mg via FT Q 6 hours PRN for gas pain.   8. Continue  hyoscyamine to 125mcg PO Q 8 hours  9. Continue methocarbamol to 500 mg via FT Q 6 hours.     Upon discharge continue x 3 days then discontinue.  10. Continue lidocaine 5% patches to, 1-3 patches TD Q 12 hours on and 12  hours off. (maximum of 3 patches at a time). Apply to most painful areas, don't apply over incisions.     Continue upon discharge, if not covered by insurance, patient can obtain lidocaine 4% patches over the counter.  11. Continue menthol patch, 1 patch, TD Q 8 hours, apply when lidocaine patches are off. Apply to most painful areas.     Continue upon discharge, patient can obtain menthol patches over the counter.   12. Continue benzocaine-menthol 6-10mg lozenge BU Q1 hour PRN for sore throat.  13. Continue phenol 1.4% spray MT Q 1 hour PRN for sore throat.  14. Patient has upcoming outpatient pain appointment with [facility] on 05/04/17.  15. Pain Service will sign off at this time.     Paged primary team several times to discuss discharge recs. Unable to reach team. Text page sent to see our note from today for recommendations.   Plan was reviewed by the Pain Service consisting of Dr. Mike Enamorado MD.    Thank you for consulting the Inpatient Pain Management Service.   The above recommendations are to be acted upon at the primary team s discretion.     To reach us:  Mon - Friday 8 AM - 3 PM: Pager [phone]   After hours, weekends and holidays: Primary service should call [phone] for the on-call pain specialist    CURRENT MEDICATIONS:   Current Facility-Administered Medications Ordered in Epic   Medication Dose Route Frequency Provider Last Rate Last Dose     ranitidine (Zantac) syrup 150 mg  150 mg Oral BID Qi, Blas Acosta MD   150 mg at 04/26/17 2016     oxyCODONE (ROXICODONE) solution 10-15 mg  10-15 mg Oral Q3H PRN Qi, Blas Acosta MD         HYDROmorphone (PF) (DILAUDID) injection 0.3-0.5 mg  0.3-0.5 mg Intravenous Q4H PRN Qi, Blas Acosta MD         gabapentin (NEURONTIN) solution 300 mg  300 mg Oral Q8H ANDREI Qi, Blas Acosta MD   300 mg at 04/27/17 0623     fiber modular (NUTRISOURCE FIBER) packet 1 packet  1 packet Per Feeding Tube BID Saul Rogers,  REJI         lidocaine (LIDODERM) 5 % Patch 1-3 patch  1-3 patch Transdermal Q24h Blas Dominguez MD   2 patch at 04/26/17 2034    And     lidocaine (LIDODERM) patch REMOVAL   Transdermal Q24H Angelica, Blas Acosta MD        And     lidocaine (LIDODERM) Patch in Place   Transdermal Q8H Angelica, Blas Acosta MD         simethicone (MYLICON) suspension 40 mg  40 mg Per Feeding Tube Q6H PRN Qi, Blas Acosta MD   40 mg at 04/26/17 1413     menthol (ICY HOT) 5 % patch 1 patch  1 patch Topical Q8H PRN Qi, Blas Acosta MD   1 patch at 04/26/17 1404    And     menthol (ICY HOT) Patch in Place   Transdermal Q8H Qi, Blas Acosta MD        And     menthol (ICY HOT) patch REMOVAL   Transdermal Q8H PRN Angelica, Blas Acosta MD         docusate (COLACE) 50 MG/5ML liquid 100 mg  100 mg Per J Tube At Bedtime PRN Angelica, Blas Acosta MD         sennosides (SENOKOT) syrup 5 mL  5 mL Per J Tube BID PRN Qi, Blas Acosta MD         enoxaparin (LOVENOX) injection 50 mg  1 mg/kg Subcutaneous Q12H Qi, Blas Acosta MD   50 mg at 04/26/17 2014     levalbuterol (XOPENEX) neb solution 0.63 mg  0.63 mg Nebulization Q4H PRN Andrea Knapp MD         sodium chloride 3 % neb solution 3 mL  3 mL Nebulization Q3H PRN Andrea Knapp MD         potassium chloride SA (K-DUR/KLOR-CON M) CR tablet 20-40 mEq  20-40 mEq Oral Q2H PRN Faisal Dolan MD         potassium chloride 10 mEq in 100 mL intermittent infusion  10 mEq Intravenous Q1H PRN Faisal Dolan MD         potassium chloride 10 mEq in 100 mL intermittent infusion with 10 mg lidocaine  10 mEq Intravenous Q1H PRN Faisal Dolan  mL/hr at 04/26/17 1911 10 mEq at 04/26/17 1911     potassium chloride 20 mEq in 50 mL intermittent infusion  20 mEq Intravenous Q90 MIN PRN Faisal Dolan MD         potassium chloride (KLOR-CON) Packet 20 mEq  20 mEq Oral or Feeding Tube BID PRN Richard Lock MD   20 mEq at 04/25/17 2058      benzocaine-menthol (CHLORASEPTIC) 6-10 MG lozenge 1 lozenge  1 lozenge Buccal Q1H PRN Jd Garcia MD   1 lozenge at 04/25/17 2317     metoprolol (LOPRESSOR) suspension 50 mg  50 mg Per J Tube BID Blas Dominguez MD   50 mg at 04/26/17 2015     hyoscyamine (ANASPAZ/LEVSIN) tablet 125 mcg  125 mcg Oral TID Saul Rogers PA-C   125 mcg at 04/26/17 2014     loperamide (IMODIUM) liquid 2 mg  2 mg Per Feeding Tube 4x Daily PRN Justice Mohan MD   2 mg at 04/24/17 0102     loperamide (IMODIUM) liquid 2 mg  2 mg Per J Tube BID Alvin Dee MD   2 mg at 04/27/17 0754     ondansetron (ZOFRAN) solution 4-8 mg  4-8 mg Per J Tube Q6H PRN Jd Garcia MD         opium tincture 10 MG/ML (1%) liquid 6 mg  6 mg Oral Q6H Saul Rogers PA-C   6 mg at 04/27/17 0300     oxyCODONE (ROXICODONE) solution 10 mg  10 mg Oral or Feeding Tube Q4H Alvin Dee MD   10 mg at 04/27/17 0751     traZODone (DESYREL) suspension 25 mg  25 mg Oral At Bedtime PRN Alvin Dee MD   25 mg at 04/24/17 0033     dextrose 10 % 1,000 mL infusion   Intravenous Continuous PRN Saul Rogers PA-C         multivitamins with minerals (CERTAVITE/CEROVITE) liquid 15 mL  15 mL Per Feeding Tube Daily Saul Rogers PA-C   15 mL at 04/26/17 1003     ondansetron (ZOFRAN) injection 4 mg  4 mg Intravenous Q6H PRN Alvin Dee MD   4 mg at 04/21/17 1211     methocarbamol (ROBAXIN) tablet 500 mg  500 mg Oral 4x Daily Giorgi Husain NP   500 mg at 04/26/17 2014     sodium chloride (PF) 0.9% PF flush 10-20 mL  10-20 mL Intracatheter Q1H PRN Alvin Dee MD   20 mL at 04/23/17 1349     heparin lock flush 10 UNIT/ML injection 5-10 mL  5-10 mL Intracatheter Q24H Alvin Dee MD   5 mL at 04/19/17 1517     heparin lock flush 10 UNIT/ML injection 5-10 mL  5-10 mL Intracatheter Q1H PRAlvin Mcallister MD   5 mL at 04/21/17 1645     lidocaine 1 % 1 mL  1 mL Other Q1H PRAlvin Mcallister MD   1 mL at  04/23/17 1818     lidocaine (LMX4) kit   Topical Q1H PRN Alvin Dee MD         sodium chloride (PF) 0.9% PF flush 3 mL  3 mL Intracatheter Q1H PRN Alvin Dee MD         sodium chloride (PF) 0.9% PF flush 3 mL  3 mL Intracatheter Q8H Alvin Dee MD   3 mL at 04/27/17 0045     magnesium sulfate 4 g in 100 mL sterile water (premade)  4 g Intravenous Q4H PRN Sobia Scott APRN CNP         doxepin (SINEquan) 10 MG/ML (HIGH CONC) solution 3 mg  3 mg Oral At Bedtime Giorgi Husain NP   3 mg at 04/26/17 2124     magnesium sulfate 2 g in NS intermittent infusion (PharMEDium or [facility] Cmpd)  2 g Intravenous Daily PRN Sobia Scott APRN CNP 50 mL/hr at 04/21/17 1751 2 g at 04/23/17 1111     pantoprazole (PROTONIX) suspension 40 mg  40 mg Oral or Feeding Tube Daily Andrea Knapp MD   40 mg at 04/27/17 0751     acetaminophen (TYLENOL) solution 975 mg  975 mg Oral Q8H Giorgi Husain NP   975 mg at 04/27/17 0429     phenol (CHLORASEPTIC) 1.4 % spray 1 mL  1 spray Mouth/Throat Q1H PRN Sobia Scott APRN CNP   1 mL at 04/22/17 2216     glucose 40 % gel 15-30 g  15-30 g Oral Q15 Min PRN Arslan Wisdom MD        Or     dextrose 50 % injection 25-50 mL  25-50 mL Intravenous Q15 Min PRN Arslan Wisdom MD   25 mL at 04/21/17 1528    Or     glucagon injection 1 mg  1 mg Subcutaneous Q15 Min PRN Arslan Wisdom MD         naloxone (NARCAN) injection 0.1-0.4 mg  0.1-0.4 mg Intravenous Q2 Min PRN Arslan Wisdom MD         insulin aspart (NovoLOG) inj (RAPID ACTING)  1-6 Units Subcutaneous Q4H Arslan Wisdom MD   1 Units at 04/26/17 2124     prochlorperazine (COMPAZINE) injection 5 mg  5 mg Intravenous Q6H PRN Arslan Wisdom MD        Or     prochlorperazine (COMPAZINE) tablet 5 mg  5 mg Oral Q6H PRN Arslan Wisdom MD        Or     prochlorperazine (COMPAZINE) Suppository 12.5 mg  12.5 mg Rectal Q12H PRN Arslan Wisdom MD         bupivacaine 0.25 % - EPINEPHrine 1:200,000  injection    Steven Tompkins MD   20 mL at 01/21/17 1255     Current Outpatient Prescriptions Ordered in [facility]   Medication Sig Dispense Refill     order for DME Equipment being ordered:   [facility] Suction Machine-Intermittent  Suction Canisters(2)  Suction Canister Holders(2)  Suction Connect Tube(2)  5 in 1 Connector(2)  Bacteria Filter(2)  Yaunkauer Suction(2)    Treatment Diagnosis: Esophogeal Perforation, s/p esophagectomy 1 Device 0     order for DME Equipment being ordered:   Suction Pump  Suction Canister(2)  Suction Tubing(2)  Bacteria Filter(2)  Yaunkauer(4)  Red Rubber Catheter(2)    Treatment Diagnosis: Esophogeal Perforation, s/p esophagectomy 1 Device 0       Vitals:   Temp:  [96.8  F (36  C)-97.3  F (36.3  C)] 97  F (36.1  C)  Heart Rate:  [84-97] 91  Resp:  [15-20] 20  BP: ()/(49-60) 121/60  SpO2:  [90 %-97 %] 97 %  Patient Vitals for the past 8 hrs:   BP Temp Temp src Heart Rate Resp SpO2   04/27/17 0330 121/60 97  F (36.1  C) Oral 91 20 97 %   04/27/17 0022 100/50 96.8  F (36  C) Oral 84 15 90 %       LAB DATA:  Results for orders placed or performed during the hospital encounter of 04/17/17 (from the past 24 hour(s))   Pain Management IP Consult: POD 7 s/p spit fistula takedown, gastric pullup and J-tube due to history of esophageal perforation. Currently on PCA.; Consultant may enter orders: Yes; Patient to be seen: Routine - within 24 hours; Call back #: [phone]...    Sundar Ryan, CNP     4/26/2017 12:56 PM  Inpatient Pain Management Service: Re-Consultation    Inpatient Pain Service re-consulted during the same admission for   the same issue,  postoperative pain management. This visit will   be billed as a follow-up visit.     DATE OF CONSULT: April 26, 2017      REASON FOR PAIN CONSULTATION:  Minerva Blanco is a 72 year old   female I am seeing in consultation at the request of Dr. Blas Dominguez MD for evaluation and recommendations for her acute   postoperative  "pain.       CHIEF PAIN COMPLAINT: 1. neck/throat pain, 2. Chest pain (spit   fistula site pain), 3.abdominal pain, and 4. left thigh/hip pain.         ASSESSMENT:   1. Acute post operative pain status post neck dissection, spit   fistula takedown, laparoscopic jejunostomy tube and pharyngostomy   tube with gastric pull up on 04/17/17. Patient reporting   neck/throat pain with left anterior chest pain at spit fistula   site. Patient now POD #9 and currently on hydromorphone PCA, and   oral oxycodone scheduled and PRN.   2. Acute abdominal pain, that appears to be related to GI   cramping and gas pain. Patient reports cramping,  gas and   bloating that causes increased pain.  Patient currently on   hyoscyamine, and I agree patient would benefit from addition of   simethicone, per pain note from 04/19/17.  3. Acute left hip/thigh pain, patient reports it feels like a   \"charley horse\". No workup pending by the primary team at this   time. Pain appears to be myofascial in nature. Patient already on   methocarbamol for muscle relaxant. Plan to increase dose.   4. History of proximal gastrectomy, distal esophagectomy, spit   fistula creation, and left thoracotomy with mediastinal phlegmon   excision on 1/9/2017  5. History of Toupet fundoplication 1/2016 with a post operative   course that was complicated by esophageal perforation with   mediastinal phlegmon.  6. History of a fib, warfarin now being held.  7. History of breast cancer  8. History of fibromyalgia  9. History of multiple sclerosis  10. History of hiatal hernia  11. History of IBS  12. Patient has upcoming outpatient pain appointment with the   [facility] on 05/04/17.     Outpatient medications related to pain prior to admission:   --oxycodone 5-10 mg PO Q 6 hours PRN (patient reported using   30-40 mg per day on average).   Outpatient opioids prescribed by: LAUREL Telles  PRIMARY CARE PROVIDER: Andrea Nino " R  PAIN SPECIALIST: NONE    [place]  database reviewed: reviewed      TREATMENT RECOMMENDATIONS/PLAN:   1. Discontinue HYDROmorphone PCA  2. Change oxycodone liquid to 10-15 mg via FT Q 3 hours PRN  3. Start hydromorphone 0.3-0.5 mg IV Q 4 hours PRN, x 24 hours   then discontinue,  for rescue only, ok to give 15 minutes prior   to activity or physical therapy.   4. Continue oxycodone liquid 10 mg via FT Q 4 hours scheduled.   Would recommend tapering this off prior to discharge.   5. Change gabapentin liquid  to 300 mg via FT Q 8 hours  6. Continue to monitor LFTs while on scheduled acetaminophen.   Pending LFTs continue acetaminophen liquid 975 mg via FT Q 8   hours.   7. Defer use of NSAID to Thoracic Surgery.  8. Start simethicone liquid 40 mg via FT Q 6 hours PRN for gas   pain.   9. Change hyoscyamine to 125mcg PO Q 8 hours  10. Continue methocarbamol to 500 mg via FT Q 6 hours.   11. Change lidocaine 5% patches to, 1-3 patches TD Q 12 hours on   and 12 hours off. (maximum of 3 patches at a time). Apply to most   painful areas, don't apply over incisions.   12. Start menthol patch, 1 patch, TD Q 8 hours, apply when   lidocaine patches are off. Apply to most painful areas.   13. Continue benzocaine-menthol 6-10mg lozenge BU Q1 hour PRN for   sore throat.  14. Continue phenol 1.4% spray MT Q 1 hour PRN for sore throat.  15. Change scheduled sennosides and docusate to PRN, as patient   is having loose stools and is on loperamide liquid 2 mg via FT   BID.       ASSESSMENT AND RECOMMENDATIONS DISCUSSED WITH: Dr. Blas Dominguez MD,   plan discussed with Dr. Mike Enamorado MD from the pain   service.       Thank you for consulting the Inpatient Pain Management Service.     The above recommendations are to be acted upon at the primary   team s discretion.    To reach us:  Mon - Friday 8 AM - 3 PM: Pager [phone]   After hours, weekends and holidays: Primary service should call   [phone] for the on-call pain  specialist    HISTORY OF PRESENT ILLNESS: Minerva Blanco is a 72 year old   female with history of MS, Breast cancer, hiatal hernia, IBS. She   has a history of abdominal/thoracic pain that is secondary to   esophageal perforation, and underwent  proximal gastrectomy,   distal esophagectomy, spit fistula creation, and left thoracotomy   with mediastinal phlegmon excision on 1/9/2017. Patient had   difficult to control postoperative pain with her procedure in   January 2017. Patient underwent neck dissection, spit fistula   takedown, laparoscopic jejunostomy tube and pharyngostomy tube   with gastric pull up on 04/17/17, and is now POD #9. Patient was   seen in the PAC clinic prior to her surgery and was seen in   consultation by the Pain Service on POD # 1 and made   recommendations at that time to transition to orals. Patient   maintanted on hydromorphone PCA by primary team since that time,   and has been using on average 10-16 mg total daily dose of IV   hydromorphone per day, over the past 5 days, in addition to   getting oxycodone 10 mg scheduled Q 4 hours and oxycodone 5 mg PO   Q 3 hours PRN. Plan is for patient to discharge on Friday per   discussion with primary team today.    Today patient resting comfortably in bed, in no distress. She   reports multiple pain sites, including her neck/throat, left   anterior spit fistula site, abdomen, and left anterior thigh/hip.   She reports her pain is tolerable with discomfort at this time   and states the pain is getting better. She reports her pain is   most severe in her throat, followed by her chest, abdomen then   left thigh. She reports her throat pain feels like a lump in her   throat and is sharp pain that is worse with swallowing anything,   and reports increased pain with activity. She reports phenol   spray and benzocaine lozenges help a little. She reports some   improvement in pain when she hits the PCA button. She reports her   left anterior chest  "pain is dull, achy, constant, abdominal pain   is cramping, bloating, gas pain, and worse with medication   administration via FT. She reports having loose stools that she   associates with increased abdominal cramping. She report left hip   and thigh pain that started about 3 days ago, and describes the   pain as a \"charley horse\". She is up ambulating in the room and   working with PT. She reports sleeping off and on last night. She   denies illicit drug use and denies ETOH abuse.     PAIN HISTORY:   CAPA (Clinically Aligned Pain Assessment)  Comfort (How is your pain?): Tolerable with discomfort  Change in Pain (Since your last medication/intervention?):   Getting better  Pain Control (How are your pain treatments working?): Partially   effective pain control  Functioning (Are you able to do activities to get better?) : Pain   keeps me from doing most of what I need to do  Sleep (Does your pain management allow you to sleep or rest?):   Awake with occasional pain       FUNCTIONAL STATUS:  Change:      improving  Oral intake:     Tube feedings  Bowel function:    Liquid or diarrhea 2 times per day  Activity level:     Up with assistance ambulating in room  Sleep:      hours/night: sleeping off and on about 3-4 hours   total per patient report  Mood:      adjusting to situation      REVIEW OF 10 BODY SYSTEMS: 10 point ROS of systems including   Constitutional, Eyes, Respiratory, Cardiovascular,   Gastroenterology, Genitourinary, Integumentary, Musculoskeletal,   Psychiatric were all negative except for pertinent positives   noted in my HPI.       INPATIENT MEDICATIONS PERTINENT TO PAIN CONSULT:   --hydromorphone PCA 0.2-0.3mg Q 10 minute lockout with NO   continuous rate. (used 15mg from 7MA-7AM).  --oxycodone liquid 10 mg via FT Q4 hours scheduled   --oxycodone 5 mg via FT Q 3 hours PRN (used 10 mg from 7AM-7AM)  --opium tincture 10mg/ml liquid 6 mg PO Q 6 hours  --acetaminophen liquid  975mg PO Q 8 " hours  --gabapentin liquid 200mg PO TID  --lidocaine 5% patches, 1-2 patches, TD Q 12 hours on and 12   hours off.  --hyoscyamine 125mcg PO TID.  --methocarbamol 500mg PO 4x daily   --phenol 1.4% spray MT Q 1 hour PRN  --benzocaine-menthol 6-10mg lozenge, 1 lozenge BU Q 1 hour PRN  --sennosides liquid 5 ml via FT BID  --docusate liquid 100mg via FT QHS  --loperamide liquid 2 mg via FT BID and loperamide liquid 2 mg   via FT 4 x per day PRN.   --trazodone liquid 25 mg PO Q HS PRN    PAST PAIN TREATMENTS:   Morphine- itching  Hydromorphone - previously caused some itching, but patient   tolerated well during last hospitalization and open to using this   again for postop pain management.   Gabapentin - worked well for postop pain, tapered off as an   outpatient once postop pain had improved. Willing to try again   for postop pain management.     CURRENT MEDICATIONS:   Current Facility-Administered Medications Ordered in Epic   Medication Dose Route Frequency Provider Last Rate Last Dose     ranitidine (Zantac) syrup 150 mg  150 mg Oral BID Qi, Blas Acosta MD         oxyCODONE (ROXICODONE) solution 10-15 mg  10-15 mg Oral Q3H PRN   Qi, Blas Acosta MD         HYDROmorphone (PF) (DILAUDID) injection 0.3-0.5 mg  0.3-0.5 mg   Intravenous Q4H PRN Qi, Blas Acosta MD         gabapentin (NEURONTIN) solution 300 mg  300 mg Oral Q8H ANDREI Qi,   Blas Acosta MD         levalbuterol (XOPENEX) neb solution 0.63 mg  0.63 mg   Nebulization Q4H PRN Andrea Knapp MD         sodium chloride 3 % neb solution 3 mL  3 mL Nebulization Q3H   PRN Andrea Knapp MD         potassium chloride SA (K-DUR/KLOR-CON M) CR tablet 20-40 mEq    20-40 mEq Oral Q2H PRN Faisal Dolan MD         potassium chloride 10 mEq in 100 mL intermittent infusion  10   mEq Intravenous Q1H PRN Faisal Dolan MD         potassium chloride 10 mEq in 100 mL intermittent infusion with   10 mg lidocaine  10 mEq  Intravenous Q1H PRN Faisal Dolan MD         potassium chloride 20 mEq in 50 mL intermittent infusion  20   mEq Intravenous Q90 MIN PRN Faisal Dolan MD           potassium chloride (KLOR-CON) Packet 20 mEq  20 mEq Oral or   Feeding Tube BID PRN Richard Lock MD   20 mEq at   04/25/17 2058     benzocaine-menthol (CHLORASEPTIC) 6-10 MG lozenge 1 lozenge  1   lozenge Buccal Q1H PRN Jd Garcia MD   1 lozenge   at 04/25/17 2317     metoprolol (LOPRESSOR) suspension 50 mg  50 mg Per J Tube BID   Angelica, Blas Acosta MD   50 mg at 04/25/17 2114     hyoscyamine (ANASPAZ/LEVSIN) tablet 125 mcg  125 mcg Oral TID   Saul Rogers PA-C   125 mcg at 04/26/17 0940     loperamide (IMODIUM) liquid 2 mg  2 mg Per Feeding Tube 4x   Daily PRN Justice Mohan MD   2 mg at 04/24/17 0102     loperamide (IMODIUM) liquid 2 mg  2 mg Per J Tube BID Alvin Dee MD   2 mg at 04/26/17 0939     ondansetron (ZOFRAN) solution 4-8 mg  4-8 mg Per J Tube Q6H PRN   Jd Garcia MD         sennosides (SENOKOT) syrup 5 mL  5 mL Per J Tube BID Saul Rogers PA-C   5 mL at 04/24/17 0839     opium tincture 10 MG/ML (1%) liquid 6 mg  6 mg Oral Q6H Saul Rogers PA-C   6 mg at 04/26/17 1003     heparin sodium PF injection 5,000 Units  5,000 Units   Subcutaneous Q8H Jd Garcia MD   5,000 Units at   04/26/17 1007     oxyCODONE (ROXICODONE) solution 10 mg  10 mg Oral or Feeding   Tube Q4H Alvin Dee MD   10 mg at 04/26/17 0928     traZODone (DESYREL) suspension 25 mg  25 mg Oral At Bedtime PRN   Alvin Dee MD   25 mg at 04/24/17 0033     dextrose 10 % 1,000 mL infusion   Intravenous Continuous PRN   Saul Rogers PA-C         multivitamins with minerals (CERTAVITE/CEROVITE) liquid 15 mL    15 mL Per Feeding Tube Daily Saul Rogers PA-C   15 mL at   04/26/17 1003     ondansetron (ZOFRAN) injection 4 mg  4 mg Intravenous Q6H  PRN   Alvin Dee MD   4 mg at 04/21/17 1211     methocarbamol (ROBAXIN) tablet 500 mg  500 mg Oral 4x Daily   Giorgi Husain NP   500 mg at 04/26/17 0939     sodium chloride (PF) 0.9% PF flush 10-20 mL  10-20 mL   Intracatheter Q1H PRAlvin Mcallister MD   20 mL at 04/23/17 1349       heparin lock flush 10 UNIT/ML injection 5-10 mL  5-10 mL   Intracatheter Q24H Alvin Dee MD   5 mL at 04/19/17 1517     heparin lock flush 10 UNIT/ML injection 5-10 mL  5-10 mL   Intracatheter Q1H PRAlvin Mcallister MD   5 mL at 04/21/17 1645     lidocaine 1 % 1 mL  1 mL Other Q1H PRAlvin Mcallister MD   1   mL at 04/23/17 1818     lidocaine (LMX4) kit   Topical Q1H PRAlvin Mcallister MD         sodium chloride (PF) 0.9% PF flush 3 mL  3 mL Intracatheter Q1H   PRAlvin Mcallister MD         sodium chloride (PF) 0.9% PF flush 3 mL  3 mL Intracatheter Q8H   Alvin Dee MD   3 mL at 04/23/17 0109     lidocaine (LIDODERM) 5 % Patch 1-2 patch  1-2 patch Transdermal   Q24h Alvin Dee MD   1 patch at 04/25/17 2100    And     lidocaine (LIDODERM) patch REMOVAL   Transdermal Q24H Alvin Dee MD        And     lidocaine (LIDODERM) Patch in Place   Transdermal Q8H Alvin Dee MD         magnesium sulfate 4 g in 100 mL sterile water (premade)  4 g   Intravenous Q4H PRN Sobia Scott APRN CNP         doxepin (SINEquan) 10 MG/ML (HIGH CONC) solution 3 mg  3 mg   Oral At Bedtime Giorgi Husain NP   3 mg at 04/25/17 2102     magnesium sulfate 2 g in NS intermittent infusion ([facility]   or [facility])  2 g Intravenous Daily PRN Sobia Scott APRN CNP 50 mL/hr at 04/21/17 1751 2 g at 04/23/17 1111     pantoprazole (PROTONIX) suspension 40 mg  40 mg Oral or Feeding   Tube Daily Andrea Knapp MD   40 mg at 04/26/17 0938     acetaminophen (TYLENOL) solution 975 mg  975 mg Oral Q8H Giorgi Husain NP   975 mg at 04/26/17 0444     docusate (COLACE) 50 MG/5ML liquid 100 mg  100 mg  Per J Tube At   Bedtime Alvin Dee MD   100 mg at 04/20/17 2017     phenol (CHLORASEPTIC) 1.4 % spray 1 mL  1 spray Mouth/Throat   Q1H PRN Sobia Scott APRN CNP   1 mL at 04/22/17 2216     glucose 40 % gel 15-30 g  15-30 g Oral Q15 Min PRN Arslan Wisdom MD        Or     dextrose 50 % injection 25-50 mL  25-50 mL Intravenous Q15 Min   PRN Arslan Wisdom MD   25 mL at 04/21/17 1528    Or     glucagon injection 1 mg  1 mg Subcutaneous Q15 Min PRN Arslan Wisdom MD         naloxone (NARCAN) injection 0.1-0.4 mg  0.1-0.4 mg Intravenous   Q2 Min PRN Arslan Wisdom MD         insulin aspart (NovoLOG) inj (RAPID ACTING)  1-6 Units   Subcutaneous Q4H Arslan Wisdom MD   1 Units at 04/22/17 0038     prochlorperazine (COMPAZINE) injection 5 mg  5 mg Intravenous   Q6H PRN Arslan Wisdom MD        Or     prochlorperazine (COMPAZINE) tablet 5 mg  5 mg Oral Q6H PRN   Arslan Wisdom MD        Or     prochlorperazine (COMPAZINE) Suppository 12.5 mg  12.5 mg   Rectal Q12H PRN Arslan Wisdom MD         bupivacaine 0.25 % - EPINEPHrine 1:200,000 injection    PRN   Steven Perez MD   20 mL at 01/21/17 1255     No current [facility]-ordered outpatient prescriptions on file.           HOME/PREVIOUS MEDICATIONS:   Prior to Admission medications    Medication Sig Start Date End Date Taking? Authorizing Provider   DiphenhydrAMINE HCl (BENADRYL PO) Take 25 mg by mouth nightly as   needed   Yes Unknown, Entered By History   ACETAMINOPHEN PO Take 500-1,000 mg by mouth every 8 hours as   needed for pain   Yes Unknown, Entered By History   OXYCODONE HCL PO Take 5-10 mg by mouth every 6 hours as needed     Yes Unknown, Entered By History   cholestyramine (QUESTRAN) 4 G Packet Take 1 packet by mouth daily     Yes Unknown, Entered By History   multivitamins with minerals (CERTAVITE/CEROVITE) LIQD liquid Take   15 mLs by mouth daily   Yes Unknown, Entered By History   doxepin (SILENOR) 3 MG tablet Take 1 tablet (3 mg)  by mouth   nightly as needed for sleep 4/10/17  Yes Sarina Serna APRN CNS   nystatin POWD Apply to peristomal skin with each pouch change   until rash is resolved 2/27/17  Yes Sarina Serna APRN CNS   metoprolol (LOPRESSOR) 50 MG tablet 50 mg BID.  May crush, mix   with water and administer via feeding tube  Patient taking differently: 25 mg by Per G Tube route 2 times   daily May crush, mix with water and administer via feeding tube   2/6/17  Yes Tasia Rivera APRN CNS   Zinc Sulfate 220 (50 ZN) MG TABS 220 mg daily.  OK to crush, mix   with water and administer via feeding tube. 2/6/17  Yes Tasia Rivera APRN CNS   traZODone (DESYREL) 100 MG tablet 1 tablet (100 mg) by Per G Tube   route At Bedtime 1/25/17  Yes Saul Rogers PA-C   loperamide (IMODIUM) 1 MG/5ML liquid Take 20 mLs (4 mg) by mouth   4 times daily as needed for diarrhea  Patient taking differently: Take 4 mg by mouth 2 times daily    1/25/17  Yes Saul Rogers PA-C   Lactobacillus Rhamnosus, GG, (CULTURELLE PO) Take 1 tablet by   mouth 2 times daily    Yes Reported, Patient   warfarin (COUMADIN) 2.5 MG tablet Take 1 tablet (2.5 mg) by mouth   daily  Patient taking differently: Take 1.25 mg by mouth on Wednesday   and take 2.5 mg by mouth on all other days of the week. 2/22/17     Sandra John APRN CNP         ALLERGIES:    Allergies   Allergen Reactions     Amoxicillin Diarrhea     Ativan [Lorazepam] Other (See Comments)     Hallucinations     Hydromorphone Itching     4/12/17 - patient open to using this as she tolerated   Hydromorphone PCA during hospitalization in January 2017.      Morphine Itching            PAST MEDICAL AND PSYCHIATRIC HISTORY:    Past Medical History:   Diagnosis Date     Atrial fibrillation (H)      Breast cancer (H) 2007    right side - lumpectomy, chemo, and local radiation     Esophageal perforation      Fibromyalgia      GERD (gastroesophageal reflux  disease)      Hiatal hernia      History of blood transfusion      IBS (irritable bowel syndrome)      Meniere's disease     deaf left ear     MS (multiple sclerosis) (H)            PAST SURGICAL HISTORY:   Past Surgical History:   Procedure Laterality Date     APPENDECTOMY       BACK SURGERY  5/1/2015    lumbar fusion     BRONCHOSCOPY FLEXIBLE N/A 4/17/2017    Procedure: BRONCHOSCOPY FLEXIBLE;;  Surgeon: Jens Wise MD;  [facility]     C GASTROSTOMY/JEJUN TUBE      J tube for feedings     CLOSE SPIT FISTULA N/A 4/17/2017    Procedure: CLOSE SPIT FISTULA;;  Surgeon: Jens Wise MD;  [facility]     COMBINED ESOPHAGOSCOPY, GASTROSCOPY, DUODENOSCOPY (EGD), REMOVE   ESOPHAGEAL STENT N/A 8/26/2016    Procedure: COMBINED ESOPHAGOSCOPY, GASTROSCOPY, DUODENOSCOPY   (EGD), REMOVE ESOPHAGEAL STENT;  Surgeon: Jens Wise MD;  [facility]     CREATE SPIT FISTULA N/A 1/9/2017    Procedure: CREATE SPIT FISTULA;  Surgeon: Jens Wise MD;  [facility]     ESOPHAGECTOMY N/A 1/9/2017    Procedure: ESOPHAGECTOMY;  Surgeon: Jens Wise MD;  [facility]     ESOPHAGOSCOPY, GASTROSCOPY, DUODENOSCOPY (EGD), COMBINED N/A   4/18/2016    Procedure: COMBINED ESOPHAGOSCOPY, GASTROSCOPY, DUODENOSCOPY   (EGD);  Surgeon: Alexis Barraza MD;  [facility]     ESOPHAGOSCOPY, GASTROSCOPY, DUODENOSCOPY (EGD), COMBINED N/A   4/25/2016    Procedure: COMBINED ESOPHAGOSCOPY, GASTROSCOPY, DUODENOSCOPY   (EGD);  Surgeon: Alexis Barraza MD;  [facility]     ESOPHAGOSCOPY, GASTROSCOPY, DUODENOSCOPY (EGD), COMBINED N/A   5/4/2016    Procedure: COMBINED ESOPHAGOSCOPY, GASTROSCOPY, DUODENOSCOPY   (EGD);  Surgeon: Alexis Barraza MD;  [facility]     ESOPHAGOSCOPY, GASTROSCOPY, DUODENOSCOPY (EGD), COMBINED N/A   5/18/2016    Procedure: COMBINED ESOPHAGOSCOPY, GASTROSCOPY, DUODENOSCOPY   (EGD);  Surgeon: Linda  Alexis Bravo MD;  [facility]     ESOPHAGOSCOPY, GASTROSCOPY, DUODENOSCOPY (EGD), COMBINED N/A   6/22/2016    Procedure: COMBINED ESOPHAGOSCOPY, GASTROSCOPY, DUODENOSCOPY   (EGD);  Surgeon: Alexis Barraza MD;  [facility]     ESOPHAGOSCOPY, GASTROSCOPY, DUODENOSCOPY (EGD), COMBINED N/A   7/12/2016    Procedure: COMBINED ESOPHAGOSCOPY, GASTROSCOPY, DUODENOSCOPY   (EGD);  Surgeon: Jens Wise MD;  [facility]     ESOPHAGOSCOPY, GASTROSCOPY, DUODENOSCOPY (EGD), COMBINED N/A   4/21/2017    Procedure: COMBINED ESOPHAGOSCOPY, GASTROSCOPY, DUODENOSCOPY   (EGD);  Esophagogastroduodenoscopy, Pharyngostomy Tube Placement   ;  Surgeon: Jens Wise MD;  [facility]     ESOPHAGOSCOPY, GASTROSCOPY, DUODENOSCOPY (EGD), DILATATION,   COMBINED N/A 6/29/2016    Procedure: COMBINED ESOPHAGOSCOPY, GASTROSCOPY, DUODENOSCOPY   (EGD), DILATATION;  Surgeon: Jens Wise MD;    [facility]     HC UGI ENDOSCOPY W TRANSENDOSCOPIC STENT PLACEMENT N/A   7/12/2016    Procedure: COMBINED ESOPHAGOSCOPY, GASTROSCOPY, DUODENOSCOPY   (EGD), PLACE TRANSENDOSCOPIC ESOPHAGEAL STENT;  Surgeon: Jens Wise MD;  [facility]     HC UGI ENDOSCOPY W TRANSENDOSCOPIC STENT PLACEMENT N/A   7/22/2016    Procedure: COMBINED ESOPHAGOSCOPY, GASTROSCOPY, DUODENOSCOPY   (EGD), PLACE TRANSENDOSCOPIC ESOPHAGEAL STENT;  Surgeon: Jens Wise MD;  [facility]     IRRIGATION AND DEBRIDEMENT CHEST WASHOUT, COMBINED N/A   6/29/2016    Procedure: COMBINED IRRIGATION AND DEBRIDEMENT CHEST WASHOUT;    Surgeon: Jens Wise MD;  [facility]     LAPAROSCOPIC ASSISTED INSERTION TUBE JEJUNOSTOMY N/A 4/17/2017    Procedure: LAPAROSCOPIC ASSISTED INSERTION TUBE JEJUNOSTOMY;;    Surgeon: Jens Wise MD;  [facility]     LAPAROSCOPY DIAGNOSTIC (GENERAL) N/A 1/9/2017    Procedure: LAPAROSCOPY DIAGNOSTIC (GENERAL);  Surgeon: Frandy  "  Jens Saul MD;  [facility]     LAPAROSCOPY DIAGNOSTIC (GENERAL) N/A 4/17/2017    Procedure: LAPAROSCOPY DIAGNOSTIC (GENERAL);  Laparoscopic ,   Neck Dissection, Spit Fistula Takedown, Laparoscopic Jejunostomy   Tube and Pharyngostomy Tube, Gastric Pull up, Upper   Endoscopy(EGD)  , Flexible Bronchoscopy ,Sternotomy ;  Surgeon: Jens Wise MD;  [facility]     LUMPECTOMY BREAST Right 2007     NERVE BLOCK PERIPHERAL N/A 8/30/2016    Procedure: NERVE BLOCK PERIPHERAL;  Surgeon: GENERIC ANESTHESIA   PROVIDER;  Location[facility]     NISSEN FUNDOPLICATION  1/2016     PHARYNGOSTOMY N/A 4/18/2016    Procedure: PHARYNGOSTOMY;  Surgeon: Alexis Barraza MD;  [facility]     PHARYNGOSTOMY N/A 4/25/2016    Procedure: PHARYNGOSTOMY;  Surgeon: Alexis Barraza MD;  [facility]     PHARYNGOSTOMY N/A 5/4/2016    Procedure: PHARYNGOSTOMY;  Surgeon: Alexis Barraza MD;  [facility]     PHARYNGOSTOMY N/A 6/22/2016    Procedure: PHARYNGOSTOMY;  Surgeon: Alexis Barraza MD;  [facility]     PHARYNGOSTOMY N/A 6/29/2016    Procedure: PHARYNGOSTOMY;  Surgeon: Jens Wise MD;  [facility]     PHARYNGOSTOMY N/A 4/21/2017    Procedure: PHARYNGOSTOMY;;  Surgeon: Jens Wise MD;  [facility]     PICC INSERTION Left 8/25/2016    5fr DL BioFlo PICC, 42cm (3cm external) in the L medial brachial   vein w/ tip in the SVC RA junction.     THORACOTOMY Right 1998    lung infection - \"hard crust formed on lung\"     THORACOTOMY Left 1/9/2017    Procedure: THORACOTOMY;  Surgeon: Jens Wise MD;    [facility]     WRIST SURGERY             FAMILY HISTORY: family history includes Alzheimer Disease in her   mother; Breast Cancer in her daughter; CEREBROVASCULAR DISEASE in   her father; Ovarian Cancer in her sister.      HEALTH & LIFESTYLE PRACTICES:   Tobacco:  reports that she quit smoking about 19 years " ago. Her   smoking use included Cigarettes. She has a 15.00 pack-year   smoking history. She does not have any smokeless tobacco history   [Narrative was truncated due to length]   XR Chest 2 Views    Narrative    Exam: XR CHEST 2 VW, 4/26/2017 11:05 AM    Indication: interval change    Comparison: Chest radiograph 4/25/2017.    Findings:   PA and lateral views the chest. Postoperative changes from  retrosternal pull-through. Trach silhouette and pulmonary vascular are  stable. Stable just below pleural effusions with perihilar and  bibasilar prominent airspace opacities, not significant change from  prior exam. Air-filled loops of bowel in the upper abdomen. No acute  osseous abnormalities..      Impression    Impression:   1. Stable perihilar and basilar predominant airspace opacities. Mild  pulmonary edema with underlying atelectasis versus consolidation.  2. Stable bilateral trace pleural effusions.  3. Stable postsurgical changes. Stable opacities in the right mid and  lower lung.    I have personally reviewed the examination and initial interpretation  and I agree with the findings.    ANNETTE NGUYEN MD   Glucose by meter   Result Value Ref Range    Glucose 103 (H) 70 - 99 mg/dL   Glucose by meter   Result Value Ref Range    Glucose 114 (H) 70 - 99 mg/dL   Glucose by meter   Result Value Ref Range    Glucose 140 (H) 70 - 99 mg/dL   Glucose by meter   Result Value Ref Range    Glucose 111 (H) 70 - 99 mg/dL   Glucose by meter   Result Value Ref Range    Glucose 118 (H) 70 - 99 mg/dL       Sundar Dee CNP  April 27, 2017 7:56 AM

## 2017-04-28 ENCOUNTER — APPOINTMENT (OUTPATIENT)
Dept: PHYSICAL THERAPY | Facility: CLINIC | Age: 72
DRG: 326 | End: 2017-04-28
Attending: THORACIC SURGERY (CARDIOTHORACIC VASCULAR SURGERY)
Payer: MEDICARE

## 2017-04-28 ENCOUNTER — APPOINTMENT (OUTPATIENT)
Dept: SPEECH THERAPY | Facility: CLINIC | Age: 72
DRG: 326 | End: 2017-04-28
Attending: THORACIC SURGERY (CARDIOTHORACIC VASCULAR SURGERY)
Payer: MEDICARE

## 2017-04-28 VITALS
OXYGEN SATURATION: 96 % | RESPIRATION RATE: 18 BRPM | TEMPERATURE: 98 F | DIASTOLIC BLOOD PRESSURE: 56 MMHG | HEIGHT: 60 IN | SYSTOLIC BLOOD PRESSURE: 110 MMHG | WEIGHT: 100.9 LBS | HEART RATE: 87 BPM | BODY MASS INDEX: 19.81 KG/M2

## 2017-04-28 LAB
BACTERIA SPEC CULT: NO GROWTH
BACTERIA SPEC CULT: NO GROWTH
MICRO REPORT STATUS: NORMAL
MICRO REPORT STATUS: NORMAL
SPECIMEN SOURCE: NORMAL
SPECIMEN SOURCE: NORMAL

## 2017-04-28 PROCEDURE — 25000132 ZZH RX MED GY IP 250 OP 250 PS 637: Mod: GY | Performed by: SURGERY

## 2017-04-28 PROCEDURE — A9270 NON-COVERED ITEM OR SERVICE: HCPCS | Mod: GY | Performed by: STUDENT IN AN ORGANIZED HEALTH CARE EDUCATION/TRAINING PROGRAM

## 2017-04-28 PROCEDURE — A9270 NON-COVERED ITEM OR SERVICE: HCPCS | Mod: GY | Performed by: SURGERY

## 2017-04-28 PROCEDURE — A9270 NON-COVERED ITEM OR SERVICE: HCPCS | Mod: GY | Performed by: PHYSICIAN ASSISTANT

## 2017-04-28 PROCEDURE — 25000128 H RX IP 250 OP 636: Performed by: STUDENT IN AN ORGANIZED HEALTH CARE EDUCATION/TRAINING PROGRAM

## 2017-04-28 PROCEDURE — A9270 NON-COVERED ITEM OR SERVICE: HCPCS | Mod: GY | Performed by: NURSE PRACTITIONER

## 2017-04-28 PROCEDURE — 40000193 ZZH STATISTIC PT WARD VISIT: Performed by: REHABILITATION PRACTITIONER

## 2017-04-28 PROCEDURE — 25000132 ZZH RX MED GY IP 250 OP 250 PS 637: Mod: GY | Performed by: PHYSICIAN ASSISTANT

## 2017-04-28 PROCEDURE — 25000132 ZZH RX MED GY IP 250 OP 250 PS 637: Mod: GY | Performed by: NURSE PRACTITIONER

## 2017-04-28 PROCEDURE — 92526 ORAL FUNCTION THERAPY: CPT | Mod: GN | Performed by: SPEECH-LANGUAGE PATHOLOGIST

## 2017-04-28 PROCEDURE — 40000225 ZZH STATISTIC SLP WARD VISIT: Performed by: SPEECH-LANGUAGE PATHOLOGIST

## 2017-04-28 PROCEDURE — 97110 THERAPEUTIC EXERCISES: CPT | Mod: GP | Performed by: REHABILITATION PRACTITIONER

## 2017-04-28 PROCEDURE — 25000132 ZZH RX MED GY IP 250 OP 250 PS 637: Mod: GY | Performed by: STUDENT IN AN ORGANIZED HEALTH CARE EDUCATION/TRAINING PROGRAM

## 2017-04-28 PROCEDURE — 97116 GAIT TRAINING THERAPY: CPT | Mod: GP | Performed by: REHABILITATION PRACTITIONER

## 2017-04-28 RX ORDER — HYOSCYAMINE SULFATE 0.125 MG
125 TABLET ORAL 3 TIMES DAILY
Qty: 90 TABLET | Refills: 0 | Status: ON HOLD | OUTPATIENT
Start: 2017-04-28 | End: 2017-05-09

## 2017-04-28 RX ORDER — SIMETHICONE 40MG/0.6ML
40 SUSPENSION, DROPS(FINAL DOSAGE FORM)(ML) ORAL EVERY 6 HOURS PRN
Qty: 30 ML | Refills: 0 | Status: ON HOLD | OUTPATIENT
Start: 2017-04-28 | End: 2017-06-29

## 2017-04-28 RX ORDER — MORPHINE 10 MG/ML
0.6 TINCTURE ORAL EVERY 6 HOURS
Qty: 72 ML | Refills: 0 | Status: ON HOLD | OUTPATIENT
Start: 2017-04-28 | End: 2017-05-09

## 2017-04-28 RX ORDER — OXYCODONE HCL 5 MG/5 ML
5-10 SOLUTION, ORAL ORAL EVERY 4 HOURS PRN
Qty: 1000 ML | Refills: 0 | Status: ON HOLD | OUTPATIENT
Start: 2017-04-28 | End: 2017-05-03

## 2017-04-28 RX ORDER — LIDOCAINE 50 MG/G
1 PATCH TOPICAL EVERY 24 HOURS
Qty: 30 PATCH | Refills: 0 | Status: ON HOLD | OUTPATIENT
Start: 2017-04-28 | End: 2017-05-03

## 2017-04-28 RX ORDER — GABAPENTIN 250 MG/5ML
300 SOLUTION ORAL EVERY 8 HOURS
Qty: 450 ML | Refills: 0 | Status: ON HOLD | OUTPATIENT
Start: 2017-04-28 | End: 2017-07-06

## 2017-04-28 RX ORDER — TRAZODONE HYDROCHLORIDE 100 MG/1
50 TABLET ORAL AT BEDTIME
Qty: 30 TABLET | Refills: 0 | Status: SHIPPED | OUTPATIENT
Start: 2017-04-28 | End: 2019-05-08

## 2017-04-28 RX ORDER — OXYCODONE HCL 5 MG/5 ML
5-10 SOLUTION, ORAL ORAL
Status: DISCONTINUED | OUTPATIENT
Start: 2017-04-28 | End: 2017-04-28 | Stop reason: HOSPADM

## 2017-04-28 RX ORDER — METHOCARBAMOL 500 MG/1
500 TABLET, FILM COATED ORAL 4 TIMES DAILY
Qty: 120 TABLET | Refills: 0 | Status: ON HOLD | OUTPATIENT
Start: 2017-04-28 | End: 2017-05-03

## 2017-04-28 RX ORDER — GUAR GUM
1 PACKET (EA) ORAL 2 TIMES DAILY
Qty: 60 PACKET | Refills: 0 | Status: ON HOLD | OUTPATIENT
Start: 2017-04-28 | End: 2017-05-09

## 2017-04-28 RX ADMIN — HYOSCYAMINE SULFATE 125 MCG: 0.12 TABLET ORAL at 08:01

## 2017-04-28 RX ADMIN — OXYCODONE HYDROCHLORIDE 10 MG: 5 SOLUTION ORAL at 10:41

## 2017-04-28 RX ADMIN — METHOCARBAMOL 500 MG: 500 TABLET ORAL at 08:01

## 2017-04-28 RX ADMIN — OXYCODONE HYDROCHLORIDE 10 MG: 5 SOLUTION ORAL at 00:05

## 2017-04-28 RX ADMIN — PANTOPRAZOLE SODIUM 40 MG: 40 TABLET, DELAYED RELEASE ORAL at 08:02

## 2017-04-28 RX ADMIN — LOPERAMIDE HYDROCHLORIDE 2 MG: 1 SOLUTION ORAL at 08:02

## 2017-04-28 RX ADMIN — Medication 6 MG: at 04:31

## 2017-04-28 RX ADMIN — ENOXAPARIN SODIUM 50 MG: 60 INJECTION SUBCUTANEOUS at 08:01

## 2017-04-28 RX ADMIN — OXYCODONE HYDROCHLORIDE 10 MG: 5 SOLUTION ORAL at 08:18

## 2017-04-28 RX ADMIN — MULTIVITAMIN 15 ML: LIQUID ORAL at 08:03

## 2017-04-28 RX ADMIN — GABAPENTIN 300 MG: 250 SOLUTION ORAL at 06:46

## 2017-04-28 RX ADMIN — OXYCODONE HYDROCHLORIDE 10 MG: 5 SOLUTION ORAL at 04:30

## 2017-04-28 RX ADMIN — METOPROLOL TARTRATE 50 MG: 100 TABLET, FILM COATED ORAL at 08:03

## 2017-04-28 RX ADMIN — METHOCARBAMOL 500 MG: 500 TABLET ORAL at 12:44

## 2017-04-28 RX ADMIN — ACETAMINOPHEN 975 MG: 325 SOLUTION ORAL at 12:44

## 2017-04-28 RX ADMIN — ACETAMINOPHEN 975 MG: 325 SOLUTION ORAL at 04:19

## 2017-04-28 RX ADMIN — Medication 1 PACKET: at 08:04

## 2017-04-28 RX ADMIN — Medication 6 MG: at 10:29

## 2017-04-28 RX ADMIN — OXYCODONE HYDROCHLORIDE 10 MG: 5 SOLUTION ORAL at 13:35

## 2017-04-28 RX ADMIN — RANITIDINE HYDROCHLORIDE 150 MG: 15 SOLUTION ORAL at 08:02

## 2017-04-28 RX ADMIN — OXYCODONE HYDROCHLORIDE 10 MG: 5 SOLUTION ORAL at 06:46

## 2017-04-28 NOTE — PLAN OF CARE
Problem: Goal Outcome Summary  Goal: Goal Outcome Summary  PT - per plan established by the Physical Therapist, according to functional mobility the  discharge recommendation is home with assist and to resume OP PT. Pt has progressed well with all mobility skills. Pt is IND for all bed mob, and sit to stand. Pt amb in room no AD but amb with WW for longer walks. Pt has demo up to 250'x 1 with PT on 4/25/17.      At time of D/c pt met 4/4 goals  Pt to cont with OP PT

## 2017-04-28 NOTE — PLAN OF CARE
Problem: Goal Outcome Summary  Goal: Goal Outcome Summary  Speech Language Therapy Discharge Summary     Reason for therapy discharge:    All goals and outcomes met, no further needs identified.     Progress towards therapy goal(s). See goals on Care Plan in [facility] electronic health record for goal details.  Goals met     Therapy recommendation(s):    No further therapy is recommended.  Recommend advance diet as tolerated per Thoracic MD team recommendations.

## 2017-04-28 NOTE — DISCHARGE SUMMARY
NAME: Minerva Blanco   MRN: [id number]   : 1945     DATE OF ADMISSION: 2017     PRE/POSTOPERATIVE DIAGNOSES:   1. Esophageal discontinuity.   2. End-stage gastroesophageal reflux disease.     PROCEDURES PERFORMED:   1. Laparoscopic lysis of adhesions.   2. Laparoscopic jejunostomy feeding tube placement.   3. Laparoscopic retrosternal gastric pull-through.   4. Resection of the left half of the manubrium.   5. Spit fistula takedown.   6. Esophagogastroscopy.   7. Flexible bronchoscopy.   8. Right tube thoracostomy.   9. Left pharyngostomy tube placement.     ADDITIONAL PROCEDURES PERFORMED:  17  1. Esophagogastroscopy with replacement of pharyngostomy tube.   2. Fluoroscopy with interpretation of images.     PATHOLOGY RESULTS:   A. Stomach, proximal gastrectomy:   - Stomach with moderate chronic gastritis   - No H. pylori like organisms identified on immunohistochemical staining   - Negative for intestinal metaplasia or dysplasia     B. Bone, 8th rib, excision:   - Benign trabecular bone and adherent fibromuscular tissue   - Normocellular bone marrow for age with trilineage hematopoiesis     C. Lymph nodes, level 7, excision:   - Two reactive lymph nodes (0/2)   - Focal noncaseating granulomas in the smaller lymph node     D. Lymph node, level 10 L, excision:   - One reactive lymph node (0/1)     E. Esophagus, distal, resection:   - Esophagus with acute and chronic inflammation, reactive changes, focal   ulceration and scarring   - Negative for dysplasia or malignancy     F. Lymph node, cervical, excision:   - One reactive lymph node (0/1)     INTRAOPERATIVE COMPLICATIONS: None     POSTOPERATIVE COMPLICATIONS: None    CONSULTS: PAIN SERVICE RECOMMENDATIONS/PLAN: 17  1. Continue oxycodone liquid 10 mg via FT Q 4 hours scheduled x 24 hours then    Tomorrow change to oxycodone liquid 10 mg via FT Q 3 hours PRN    Upon discharge change to oxycodone liquid 5-10 mg via FT Q 4 hours PRN x 7  days    then taper by changing to Q 6 hours PRN x 2 days,     then change to Q 8 hours PRN x 2 days,     then change to Q 12 hours PRN x 2 days,     then change to Q daily PRN x 2 days then off.   2. Change oxycodone liquid to 5-10 mg via FT Q 4 hours PRN, x 24 hours, and Discontinue tomorrow.   3. Discontinue IV hydromorphone today.   4. Continue gabapentin liquid to 300 mg via FT Q 8 hours    Continue until patient tapered off opioids then taper gabapentin by 300mg total daily dose every 2 days until off.   5. Continue to monitor LFTs while on scheduled acetaminophen. Pending LFTs continue acetaminophen liquid 975 mg via FT Q 8 hours.     Upon discharge continue acetaminophen 975 mg via FT Q 8 hours x 7 days then change to PRN.  6. Defer use of NSAID to Thoracic Surgery.  7. Continue simethicone liquid 40 mg via FT Q 6 hours PRN for gas pain.   8. Continue  hyoscyamine to 125mcg PO Q 8 hours  9. Continue methocarbamol to 500 mg via FT Q 6 hours.     Upon discharge continue x 3 days then discontinue.  10. Continue lidocaine 5% patches to, 1-3 patches TD Q 12 hours on and 12 hours off. (maximum of 3 patches at a time). Apply to most painful areas, don't apply over incisions.     Continue upon discharge, if not covered by insurance, patient can obtain lidocaine 4% patches over the counter.  11. Continue menthol patch, 1 patch, TD Q 8 hours, apply when lidocaine patches are off. Apply to most painful areas.     Continue upon discharge, patient can obtain menthol patches over the counter.   12. Continue benzocaine-menthol 6-10mg lozenge BU Q1 hour PRN for sore throat.  13. Continue phenol 1.4% spray MT Q 1 hour PRN for sore throat.  14. Patient has upcoming outpatient pain appointment with [facility] on 05/04/17.  15. Pain Service will sign off at this time.       DRAINS/TUBES PRESENT AT DISCHARGE: Feeding jejunostomy    DATE OF DISCHARGE:  April 28, 2017     HOSPITAL COURSE:  Minerva Blanco is a 72 year old female who on 4/17/2017 underwent the above-named procedures.  She tolerated the operation well and postoperatively was transferred to the post-surgical intensive care unit.  The remainder of her course was essentially uncomplicated with the exception of the above noted takeback to the OR for pharyngostomy replacement.  Prior to discharge, her pain was controlled well, she was able to perform ADLs and ambulate independently without difficulty, and had full return of bowel and bladder function.  On April 28, 2017, she was discharged to home in stable condition.    DISCHARGE EXAM:   A&o, AF/VSS  Resp non-labored  Distal extremities warm    Incisions healing well     DISCHARGE INSTRUCTIONS:    Discharge Procedure Orders  XR Chest 2 Views   Standing Status: Future  Standing Exp. Date: 07/27/17     INR   Standing Status: Future  Standing Exp. Date: 06/27/17     Home care nursing referral   Referral Type: Home Health Therapies & Aides     Home infusion referral     Discharge Instructions   Order Comments: DIET: Clear liquid diet for the next 5 days, then full liquid diet for one week, then soft mechanical diet until follow up.  With the soft diet chew very well and take 4-5 small meals per day, or more if necessary.  Do not eat significant amounts after 6pm to minimize reflux at night.    You will continue tube feeds until follow up, so food by mouth is primarily for pleasure.  Transition to each new diet stage and introduce new foods slowly to  how well you tolerate them.    Follow instructions provided by the dietician and the speech therapist    Sleep with the head of your bed elevated to help prevent reflux, which is common after esophagectomy    If your travel plans upon discharge include prolonged periods of sitting (a lengthy car or plane ride), it is highly beneficial to get up and walk at least once per hour to help prevent swelling and blood clots.     You may remove chest  tube dressing 48 hours after tube removal and bandage the site at your own discretion thereafter.  Small amounts of leakage are normal for 2-3 days after removal.  Feel free to call with questions.    You may get incision wet 2 days after operation. Do not submerge, soak, or scrub incision or swim until seen in follow-up.    Take incentive spirometer home for continued frequent use    Activity as tolerated, no strenous activity until seen in follow-up, no lifting greater than 10 pounds for the next 2-4 weeks.    Stay hydrated. Take over the counter fiber (metamucil or benefiber) and stool softeners (Miralax, docusate or senna) if becoming constipated.     Call for fever greater than 101.5, chills, increased size of incision, red skin around incision, vision changes, muscle strength changes, sensation changes, shortness of breath, or other concerns.    No driving while taking narcotic pain medication.    Transition to ibuprofen or tylenol/acetaminophen for pain control. Do not take tylenol/acetaminophen and acetaminophen containing narcotic (e.g., percocet or vicodin) at the same time. If you have known ulcer problems, or kidney trouble (elevated creatinine) do not take the ibuprofen.    In emergencies, call 911    For other Questions or Concerns;  A.) During weekday working hours (Monday through Friday 8am to 4:30pm)  call [phone] and ask to speak to a clinical nurse specialist.    B.) At nights (after 4:30pm), on weekends, or if urgent call [phone] and  tell the   I would like to page job code [phone], the thoracic surgery  fellow on call, please.      Wound care and dressings   Order Comments: Daily wet to dry dressing change to spit fistula site     Follow Up and recommended labs and tests   Order Comments: 1.) Follow up with an INR draw within 3 days for primary care physician, Andrea Nino, or coumadin clinic management of postop Lovenox bridge.  2.) Follow up with Jens Wise MD in  Thoracic Surgery clinic in 1 month, prior to which a CXR should be performed.   3.) Follow up with outpatient pain appointment with the [facility] on 05/04/17.       DISCHARGE MEDICATIONS:    Minerva Blanco   Home Medication Instructions PINO:[id number]    Printed on:04/28/17 0074   Medication Information                      acetaminophen (TYLENOL) 32 mg/mL solution  Take 30.45 mLs (975 mg) by mouth every 8 hours for 15 days             cholestyramine (QUESTRAN) 4 G Packet  Take 1 packet by mouth daily             DiphenhydrAMINE HCl (BENADRYL PO)  Take 25 mg by mouth nightly as needed             doxepin (SILENOR) 3 MG tablet  Take 1 tablet (3 mg) by mouth nightly as needed for sleep             enoxaparin (LOVENOX) 60 MG/0.6ML injection  Inject 0.48 mLs (48 mg) Subcutaneous every 12 hours for 7 days             fiber modular (NUTRISOURCE FIBER) packet  1 packet by Per Feeding Tube route 2 times daily             gabapentin (NEURONTIN) 250 MG/5ML solution  Take 6 mLs (300 mg) by mouth every 8 hours             hyoscyamine (ANASPAZ/LEVSIN) 0.125 MG tablet  Take 1 tablet (125 mcg) by mouth 3 times daily             Lactobacillus Rhamnosus, GG, (CULTURELLE PO)  Take 1 tablet by mouth 2 times daily              lidocaine (LIDODERM) 5 % Patch  Place 1 patch onto the skin every 24 hours Apply to point of maximal pain             loperamide (IMODIUM) 1 MG/5ML liquid  Take 20 mLs (4 mg) by mouth 4 times daily as needed for diarrhea             methocarbamol (ROBAXIN) 500 MG tablet  Take 1 tablet (500 mg) by mouth 4 times daily             metoprolol (LOPRESSOR) 50 MG tablet  50 mg BID.  May crush, mix with water and administer via feeding tube             multivitamins with minerals (CERTAVITE/CEROVITE) LIQD liquid  Take 15 mLs by mouth daily             nystatin POWD  Apply to peristomal skin with each pouch change until rash is resolved             opium tincture 10 mg/mL (1%)  liquid  Take 0.6 mLs (6 mg) by mouth every 6 hours             order for DME  Equipment being ordered:   [facility] Suction Machine-Intermittent  Suction Canisters(2)  Suction Canister Holders(2)  Suction Connect Tube(2)  5 in 1 Connector(2)  Bacteria Filter(2)  Yaunkauer Suction(2)    Treatment Diagnosis: Esophogeal Perforation, s/p esophagectomy             order for DME  Equipment being ordered:   Suction Pump  Suction Canister(2)  Suction Tubing(2)  Bacteria Filter(2)  Yaunkauer(4)  Red Rubber Catheter(2)    Treatment Diagnosis: Esophogeal Perforation, s/p esophagectomy             oxyCODONE (ROXICODONE) 5 MG/5ML solution  Take 5-10 mLs (5-10 mg) by mouth every 4 hours as needed for moderate to severe pain             ranitidine (ZANTAC) 150 MG/10ML syrup  Take 10 mLs (150 mg) by mouth 2 times daily             sennosides (SENOKOT) 1.76 mg/mL syrup  5 mLs by Per J Tube route 2 times daily as needed for constipation             simethicone (MYLICON) 40 MG/0.6ML suspension  0.6 mLs (40 mg) by Per Feeding Tube route every 6 hours as needed for cramping             traZODone (DESYREL) 100 MG tablet  0.5 tablets (50 mg) by Per G Tube route At Bedtime             warfarin (COUMADIN) 2.5 MG tablet  Take 1 tablet (2.5 mg) by mouth daily

## 2017-04-28 NOTE — PLAN OF CARE
Problem: Goal Outcome Summary  Goal: Goal Outcome Summary  Vitals:     04/27/17 1602 04/27/17 1701 04/27/17 2030 04/27/17 2233   BP: 118/61 116/55 129/61 121/59   BP Location: Left arm   Left arm Left arm   Pulse:           Resp: 16 18 18 18   Temp: 98.1  F (36.7  C) 97.1  F (36.2  C)   96.5  F (35.8  C)   TempSrc: Oral Oral   Oral   SpO2: 99% 99%   97%   Weight: 47 kg (103 lb 9.6 oz)         Height:           Patient's pain is well controlled by scheduled Tylenol and Oxycodone, have not asked for PRN pain med. Lido patch in place in upper back; icy patch near J-tube site.  Incision in left chest is c/d/i, staples intact in abdomen, but also c/d/i. Clear LS most of the lobes, denies SOB. Uses commode, transfers independently, but calls oftentimes to SBA. Voiding adequate amount, mostly mixed with loose stool. On continuous TF at 40 ml/hr via J-tube. Refused BG check even after explanation. +2 bipedal edema. Kept elevated. Continue poc.

## 2017-04-28 NOTE — PLAN OF CARE
Problem: Goal Outcome Summary  Goal: Goal Outcome Summary  Outcome: Improving  VSS, afebrile. Pain controlled. Up independently in room. Tube feeds infusing at goal. Pt tolerating. Continues to experience diarrhea. Medication helpful. Dressing changed to spit fistula site. Small serosanguineous drainage. Site clean intact. No swelling, redness or purulent drainage noted. Discharge orders reviewed with patient. Pt verbalized understanding. Pt left by transport to front door. All belongings left with patient.

## 2017-04-28 NOTE — PLAN OF CARE
Problem: Goal Outcome Summary  Goal: Goal Outcome Summary  Outcome: Improving  RBC transfusion finished without complications. VSS. Received 1x dose of 20mg IV lasix. Edema to BLE. J-tube infusing at goal rate of 40cc/hour. PRN dose 10mg oxycodone given x1 in addition to scheduled doses. Pt had 2 loose stools. Refused fiber d/t stools- pt was educated that fiber will bulk up her stools but pt still refused. Able to get up to commode with SBA. Was up in chair for most of evening. Taking in ice chips and popsicles. Continue with POC.

## 2017-04-28 NOTE — PROGRESS NOTES
Care Coordinator- Discharge Planning     Admission Date/Time:  4/17/2017  Attending MD:  Andrea Knapp*     Data  Date of initial CC assessment:  4/21/2017  Chart reviewed, discussed with interdisciplinary team.   Patient was admitted for:   1. Hx of esophagectomy    2. Esophageal perforation    3. On warfarin therapy    4. Acute post-operative pain    5. Paroxysmal atrial fibrillation (H)    6. History of esophagectomy    7. Abdominal cramping    8. Bowel habit changes    9. Insomnia, unspecified type         Assessment  Full assessment completed in previous note    Per MD team patient is medical ready for discharge to home today.  [facility] and [facility] liaisons updated regarding this information.  [facility] will deliver tube feeding supplies to her home this afternoon.  Called [facility] to let them know patient was discharging from the hospital today and they will have yaunkauer suction supplies delivered to her home.  Patient is on coumadin and will need INR drawn on 5/1, this was added to her home care order.  Her coumadin is managed by the  [facility].  Plan for discharge to home early this afternoon with family assist.         Plan  Anticipated Discharge Date:  4/28/2017  Anticipated Discharge Plan:  Home with resumption of services and yaunkauer suction supplies through [facility].      CTS Handoff completed:  YES    Sairna Orellana, RNCC  [phone]

## 2017-04-28 NOTE — PLAN OF CARE
Problem: Goal Outcome Summary  Goal: Goal Outcome Summary  Occupational Therapy Discharge Summary     Reason for therapy discharge:    Discharged to home.     Progress towards therapy goal(s). See goals on Care Plan in [facility] electronic health record for goal details.  Goals partially met.  Barriers to achieving goals:   discharge from facility. Pt not seen by discharging therapist.     Therapy recommendation(s):    Continued therapy is recommended.  Rationale/Recommendations:  increase IND and safety with ADL/IADL.

## 2017-04-29 ENCOUNTER — HOSPITAL ENCOUNTER (EMERGENCY)
Facility: CLINIC | Age: 72
Discharge: HOME | End: 2017-04-29
Attending: FAMILY MEDICINE | Admitting: FAMILY MEDICINE
Payer: MEDICARE

## 2017-04-29 VITALS
HEIGHT: 60 IN | DIASTOLIC BLOOD PRESSURE: 67 MMHG | SYSTOLIC BLOOD PRESSURE: 137 MMHG | OXYGEN SATURATION: 100 % | HEART RATE: 97 BPM | TEMPERATURE: 98.8 F | RESPIRATION RATE: 18 BRPM

## 2017-04-29 DIAGNOSIS — K94.13 MALFUNCTIONING JEJUNOSTOMY TUBE (H): ICD-10-CM

## 2017-04-29 PROCEDURE — 25000132 ZZH RX MED GY IP 250 OP 250 PS 637: Performed by: FAMILY MEDICINE

## 2017-04-29 PROCEDURE — 99283 EMERGENCY DEPT VISIT LOW MDM: CPT | Performed by: FAMILY MEDICINE

## 2017-04-29 PROCEDURE — 99283 EMERGENCY DEPT VISIT LOW MDM: CPT | Mod: Z6 | Performed by: FAMILY MEDICINE

## 2017-04-29 RX ORDER — OXYCODONE HCL 5 MG/5 ML
10 SOLUTION, ORAL ORAL ONCE
Status: COMPLETED | OUTPATIENT
Start: 2017-04-29 | End: 2017-04-29

## 2017-04-29 RX ADMIN — OXYCODONE HYDROCHLORIDE 10 MG: 5 SOLUTION ORAL at 14:53

## 2017-04-29 NOTE — DISCHARGE INSTRUCTIONS
Home.  Flush diligantly with fluids at home.  Follow up with Dr. Wise as planned.  Call if any concerns.

## 2017-04-29 NOTE — ED NOTES
Bed: IN02  Expected date: 4/29/17  Expected time:   Means of arrival:   Comments:  Ronel Blanco  Plugged J-tube

## 2017-04-29 NOTE — ED NOTES
Clogged feeding tube. Had the wrong size feeding tube placed here on 4/17. Discharged from the hospital yesterday.

## 2017-04-29 NOTE — ED AVS SNAPSHOT
[facility], Emergency Department    [address] [address]    Phone:  [phone]                                       Minerva Blanco   MRN: [id number]    Department:  [facility], Emergency Department   Date of Visit:  4/29/2017           Patient Information     Date Of Birth          1945        Your diagnoses for this visit were:     Malfunctioning jejunostomy tube (H)        You were seen by Justice Bush MD.      Follow-up Information     Follow up with Jens Wise MD.    Specialty:  Thoracic Diseases    Contact information:     [facility]  [address]  [facility] [id number] [phone]          Discharge Instructions       Home.  Flush diligantly with fluids at home.  Follow up with Dr. Wise as planned.  Call if any concerns.        Future Appointments        Provider Department Dept Phone Center    5/4/2017 2:30 PM [facility] [phone] [facility]      24 Hour Appointment Hotline       To make an appointment at any [facility], call [phone] ([phone]). If you don't have a family doctor or clinic, we will help you find one. [facility] clinics are conveniently located to serve the needs of you and your family.             Review of your medicines      Our records show that you are taking the medicines listed below. If these are incorrect, please call your family doctor or clinic.        Dose / Directions Last dose taken    acetaminophen 32 mg/mL solution   Commonly known as:  TYLENOL   Dose:  975 mg   Quantity:  1370.25 mL        Take 30.45 mLs (975 mg) by mouth every 8 hours for 15 days   Refills:  0        BENADRYL PO   Dose:  25 mg        Take 25 mg by mouth nightly as needed   Refills:  0        cholestyramine 4 G Packet   Commonly known as:  QUESTRAN   Dose:  1 packet        Take 1 packet by mouth daily   Refills:  0        CULTURELLE PO   Dose:  1 tablet         Take 1 tablet by mouth 2 times daily   Refills:  0        doxepin 3 MG tablet   Commonly known as:  SILENOR   Dose:  3 mg   Quantity:  5 tablet        Take 1 tablet (3 mg) by mouth nightly as needed for sleep   Refills:  0        enoxaparin 60 MG/0.6ML injection   Commonly known as:  LOVENOX   Dose:  1 mg/kg   Quantity:  6.72 mL        Inject 0.48 mLs (48 mg) Subcutaneous every 12 hours for 7 days   Refills:  0        fiber modular packet   Dose:  1 packet   Quantity:  60 packet        1 packet by Per Feeding Tube route 2 times daily   Refills:  0        gabapentin 250 MG/5ML solution   Commonly known as:  NEURONTIN   Dose:  300 mg   Quantity:  450 mL        Take 6 mLs (300 mg) by mouth every 8 hours   Refills:  0        hyoscyamine 0.125 MG tablet   Commonly known as:  ANASPAZ/LEVSIN   Dose:  125 mcg   Quantity:  90 tablet        Take 1 tablet (125 mcg) by mouth 3 times daily   Refills:  0        lidocaine 5 % Patch   Commonly known as:  LIDODERM   Dose:  1 patch   Quantity:  30 patch        Place 1 patch onto the skin every 24 hours Apply to point of maximal pain   Refills:  0        loperamide 1 mg/5 mL liquid   Commonly known as:  IMODIUM   Dose:  4 mg   Quantity:  200 mL        Take 20 mLs (4 mg) by mouth 4 times daily as needed for diarrhea   Refills:  0        methocarbamol 500 MG tablet   Commonly known as:  ROBAXIN   Dose:  500 mg   Quantity:  120 tablet        Take 1 tablet (500 mg) by mouth 4 times daily   Refills:  0        metoprolol 50 MG tablet   Commonly known as:  LOPRESSOR   Quantity:  60 tablet        50 mg BID.  May crush, mix with water and administer via feeding tube   Refills:  3        multivitamins with minerals Liqd liquid   Dose:  15 mL        Take 15 mLs by mouth daily   Refills:  0        nystatin Powd   Quantity:  1 each        Apply to peristomal skin with each pouch change until rash is resolved   Refills:  1        opium tincture 10 mg/mL (1%) liquid   Dose:  0.6 mL   Quantity:  72  mL        Take 0.6 mLs (6 mg) by mouth every 6 hours   Refills:  0        * order for DME   Quantity:  1 Device        Equipment being ordered:  [facility] Suction Machine-Intermittent Suction Canisters(2) Suction Canister Holders(2) Suction Connect Tube(2) 5 in 1 Connector(2) Bacteria Filter(2) Yaunkauer Suction(2)  Treatment Diagnosis: Esophogeal Perforation, s/p esophagectomy   Refills:  0        * order for DME   Quantity:  1 Device        Equipment being ordered:  Suction Pump Suction Canister(2) Suction Tubing(2) Bacteria Filter(2) Yaunkauer(4) Red Rubber Catheter(2)  Treatment Diagnosis: Esophogeal Perforation, s/p esophagectomy   Refills:  0        oxyCODONE 5 MG/5ML solution   Commonly known as:  ROXICODONE   Dose:  5-10 mg   Quantity:  1000 mL        Take 5-10 mLs (5-10 mg) by mouth every 4 hours as needed for moderate to severe pain   Refills:  0        ranitidine 150 MG/10ML syrup   Commonly known as:  Zantac   Dose:  150 mg   Quantity:  600 mL        Take 10 mLs (150 mg) by mouth 2 times daily   Refills:  0        sennosides 1.76 mg/mL syrup   Commonly known as:  SENOKOT   Dose:  5 mL   Quantity:  105 mL        5 mLs by Per J Tube route 2 times daily as needed for constipation   Refills:  0        simethicone 40 MG/0.6ML suspension   Commonly known as:  MYLICON   Dose:  40 mg   Quantity:  30 mL        0.6 mLs (40 mg) by Per Feeding Tube route every 6 hours as needed for cramping   Refills:  0        traZODone 100 MG tablet   Commonly known as:  DESYREL   Dose:  50 mg   Quantity:  30 tablet        0.5 tablets (50 mg) by Per G Tube route At Bedtime   Refills:  0        warfarin 2.5 MG tablet   Commonly known as:  COUMADIN   Dose:  2.5 mg   Quantity:  30 tablet        Take 1 tablet (2.5 mg) by mouth daily   Refills:  1        * Notice:  This list has 2 medication(s) that are the same as other medications prescribed for you. Read the directions carefully, and ask your doctor or other care provider to review them  "with you.            Orders Needing Specimen Collection     None      Pending Results     No orders found from 2017 to 2017.            Pending Culture Results     No orders found from 2017 to 2017.            Thank you for choosing [facility]       Thank you for choosing [facility] for your care. Our goal is always to provide you with excellent care. Hearing back from our patients is one way we can continue to improve our services. Please take a few minutes to complete the written survey that you may receive in the mail after you visit with us. Thank you!        [employer] Translate Information     [employer] lets you send messages to your doctor, view your test results, renew your prescriptions, schedule appointments and more. To sign up, go to [email or web address] . Click on \"Log in\" on the left side of the screen, which will take you to the Welcome page. Then click on \"Sign up Now\" on the right side of the page.     You will be asked to enter the access code listed below, as well as some personal information. Please follow the directions to create your username and password.     Your access code is: [email or web address]  Expires: 2017  7:30 AM     Your access code will  in 90 days. If you need help or a new code, please call your [facility] clinic or [phone].        Care EveryWhere ID     This is your Care EveryWhere ID. This could be used by other organizations to access your [facility] medical records  [id number]        After Visit Summary       This is your record. Keep this with you and show to your community pharmacist(s) and doctor(s) at your next visit.                  "

## 2017-04-29 NOTE — ED PROVIDER NOTES
History     Chief Complaint   Patient presents with     Feeding Problems     clogged J tube     HPI  Minerva Blanco is a 72 year old female with a history of a fib on coumadin (now held), breast cancer s/p breast conservation therapy, fibromyalgia, MS, hiatal hernia and GERD s/p Toupet fundoplication 1/2016 Neck Dissection, Spit Fistula Takedown, Laparoscopic Jejunostomy Tube and Pharyngostomy Tube, Gastric Pull up, Upper Endoscopy(EGD) on 4/17/17. Patient presents to the ED today with feeding problems. Patient reports a clogged J tube. Patient receives all of her nutrients and medication through her J tube. She states she was suppose to be slowly transitioning into taking PO solids such as jello.      I have reviewed the Medications, Allergies, Past Medical and Surgical History, and Social History in the [employer] system.    PAST MEDICAL HISTORY:   Past Medical History:   Diagnosis Date     Atrial fibrillation (H)      Breast cancer (H) 2007    right side - lumpectomy, chemo, and local radiation     Esophageal perforation      Fibromyalgia      GERD (gastroesophageal reflux disease)      Hiatal hernia      History of blood transfusion      IBS (irritable bowel syndrome)      Meniere's disease     deaf left ear     MS (multiple sclerosis) (H)        PAST SURGICAL HISTORY:   Past Surgical History:   Procedure Laterality Date     APPENDECTOMY       BACK SURGERY  5/1/2015    lumbar fusion     BRONCHOSCOPY FLEXIBLE N/A 4/17/2017    Procedure: BRONCHOSCOPY FLEXIBLE;;  Surgeon: Jens Wise MD;  [facility]     C GASTROSTOMY/JEJUN TUBE      J tube for feedings     CLOSE SPIT FISTULA N/A 4/17/2017    Procedure: CLOSE SPIT FISTULA;;  Surgeon: Jens Wise MD;  [facility]     COMBINED ESOPHAGOSCOPY, GASTROSCOPY, DUODENOSCOPY (EGD), REMOVE ESOPHAGEAL STENT N/A 8/26/2016    Procedure: COMBINED ESOPHAGOSCOPY, GASTROSCOPY, DUODENOSCOPY (EGD), REMOVE ESOPHAGEAL STENT;  Surgeon: Jens Wise  MD Kg;  [facility]     CREATE SPIT FISTULA N/A 1/9/2017    Procedure: CREATE SPIT FISTULA;  Surgeon: Jens Wise MD;  [facility]     ESOPHAGECTOMY N/A 1/9/2017    Procedure: ESOPHAGECTOMY;  Surgeon: Jens Wise MD;  [facility]     ESOPHAGOSCOPY, GASTROSCOPY, DUODENOSCOPY (EGD), COMBINED N/A 4/18/2016    Procedure: COMBINED ESOPHAGOSCOPY, GASTROSCOPY, DUODENOSCOPY (EGD);  Surgeon: Alexis Barraza MD;  [facility]     ESOPHAGOSCOPY, GASTROSCOPY, DUODENOSCOPY (EGD), COMBINED N/A 4/25/2016    Procedure: COMBINED ESOPHAGOSCOPY, GASTROSCOPY, DUODENOSCOPY (EGD);  Surgeon: Alexis Barraza MD;  [facility]     ESOPHAGOSCOPY, GASTROSCOPY, DUODENOSCOPY (EGD), COMBINED N/A 5/4/2016    Procedure: COMBINED ESOPHAGOSCOPY, GASTROSCOPY, DUODENOSCOPY (EGD);  Surgeon: Alexis Barraza MD;  [facility]     ESOPHAGOSCOPY, GASTROSCOPY, DUODENOSCOPY (EGD), COMBINED N/A 5/18/2016    Procedure: COMBINED ESOPHAGOSCOPY, GASTROSCOPY, DUODENOSCOPY (EGD);  Surgeon: Alexis Barraza MD;  [facility]     ESOPHAGOSCOPY, GASTROSCOPY, DUODENOSCOPY (EGD), COMBINED N/A 6/22/2016    Procedure: COMBINED ESOPHAGOSCOPY, GASTROSCOPY, DUODENOSCOPY (EGD);  Surgeon: Alexis Barraza MD;  [facility]     ESOPHAGOSCOPY, GASTROSCOPY, DUODENOSCOPY (EGD), COMBINED N/A 7/12/2016    Procedure: COMBINED ESOPHAGOSCOPY, GASTROSCOPY, DUODENOSCOPY (EGD);  Surgeon: Jens Wise MD;  [facility]     ESOPHAGOSCOPY, GASTROSCOPY, DUODENOSCOPY (EGD), COMBINED N/A 4/21/2017    Procedure: COMBINED ESOPHAGOSCOPY, GASTROSCOPY, DUODENOSCOPY (EGD);  Esophagogastroduodenoscopy, Pharyngostomy Tube Placement ;  Surgeon: Jens Wise MD;  [facility]     ESOPHAGOSCOPY, GASTROSCOPY, DUODENOSCOPY (EGD), DILATATION, COMBINED N/A 6/29/2016    Procedure: COMBINED ESOPHAGOSCOPY, GASTROSCOPY, DUODENOSCOPY (EGD), DILATATION;  Surgeon:  Jens Wise MD;  [facility]     HC UGI ENDOSCOPY W TRANSENDOSCOPIC STENT PLACEMENT N/A 7/12/2016    Procedure: COMBINED ESOPHAGOSCOPY, GASTROSCOPY, DUODENOSCOPY (EGD), PLACE TRANSENDOSCOPIC ESOPHAGEAL STENT;  Surgeon: Jens Wise MD;  [facility]     HC UGI ENDOSCOPY W TRANSENDOSCOPIC STENT PLACEMENT N/A 7/22/2016    Procedure: COMBINED ESOPHAGOSCOPY, GASTROSCOPY, DUODENOSCOPY (EGD), PLACE TRANSENDOSCOPIC ESOPHAGEAL STENT;  Surgeon: Jens Wise MD;  [facility]     IRRIGATION AND DEBRIDEMENT CHEST WASHOUT, COMBINED N/A 6/29/2016    Procedure: COMBINED IRRIGATION AND DEBRIDEMENT CHEST WASHOUT;  Surgeon: Jens Wise MD;  [facility]     LAPAROSCOPIC ASSISTED INSERTION TUBE JEJUNOSTOMY N/A 4/17/2017    Procedure: LAPAROSCOPIC ASSISTED INSERTION TUBE JEJUNOSTOMY;;  Surgeon: Jens Wise MD;  [facility]     LAPAROSCOPY DIAGNOSTIC (GENERAL) N/A 1/9/2017    Procedure: LAPAROSCOPY DIAGNOSTIC (GENERAL);  Surgeon: Jens Wise MD;  [facility]     LAPAROSCOPY DIAGNOSTIC (GENERAL) N/A 4/17/2017    Procedure: LAPAROSCOPY DIAGNOSTIC (GENERAL);  Laparoscopic , Neck Dissection, Spit Fistula Takedown, Laparoscopic Jejunostomy Tube and Pharyngostomy Tube, Gastric Pull up, Upper Endoscopy(EGD)  , Flexible Bronchoscopy ,Sternotomy ;  Surgeon: Jens Wise MD;  [facility]     LUMPECTOMY BREAST Right 2007     NERVE BLOCK PERIPHERAL N/A 8/30/2016    Procedure: NERVE BLOCK PERIPHERAL;  Surgeon: GENERIC ANESTHESIA PROVIDER;  [facility]     NISSEN FUNDOPLICATION  1/2016     PHARYNGOSTOMY N/A 4/18/2016    Procedure: PHARYNGOSTOMY;  Surgeon: Alexis Barraza MD;  [facility]     PHARYNGOSTOMY N/A 4/25/2016    Procedure: PHARYNGOSTOMY;  Surgeon: Alexis Barraza MD;  [facility]     PHARYNGOSTOMY N/A 5/4/2016    Procedure: PHARYNGOSTOMY;  Surgeon: Alexis Barraza MD;   "[facility]     PHARYNGOSTOMY N/A 6/22/2016    Procedure: PHARYNGOSTOMY;  Surgeon: Alexis Barraza MD;  [facility]     PHARYNGOSTOMY N/A 6/29/2016    Procedure: PHARYNGOSTOMY;  Surgeon: Jens Wise MD;  [facility]     PHARYNGOSTOMY N/A 4/21/2017    Procedure: PHARYNGOSTOMY;;  Surgeon: Jens Wise MD;  [facility]     PICC INSERTION Left 8/25/2016    5fr DL BioFlo PICC, 42cm (3cm external) in the L medial brachial vein w/ tip in the SVC RA junction.     THORACOTOMY Right 1998    lung infection - \"hard crust formed on lung\"     THORACOTOMY Left 1/9/2017    Procedure: THORACOTOMY;  Surgeon: Jens Wise MD;  [facility]     WRIST SURGERY         FAMILY HISTORY:   Family History   Problem Relation Age of Onset     Alzheimer Disease Mother      CEREBROVASCULAR DISEASE Father      cerebral hemorrhage     Ovarian Cancer Sister      Breast Cancer Daughter        SOCIAL HISTORY:   Social History   Substance Use Topics     Smoking status: Former Smoker     Packs/day: 1.00     Years: 15.00     Types: Cigarettes     Quit date: 1/1/1998     Smokeless tobacco: Not on file     Alcohol use No     No current facility-administered medications for this encounter.      Current Outpatient Prescriptions   Medication     acetaminophen (TYLENOL) 32 mg/mL solution     enoxaparin (LOVENOX) 60 MG/0.6ML injection     fiber modular (NUTRISOURCE FIBER) packet     gabapentin (NEURONTIN) 250 MG/5ML solution     hyoscyamine (ANASPAZ/LEVSIN) 0.125 MG tablet     lidocaine (LIDODERM) 5 % Patch     methocarbamol (ROBAXIN) 500 MG tablet     opium tincture 10 mg/mL (1%) liquid     oxyCODONE (ROXICODONE) 5 MG/5ML solution     traZODone (DESYREL) 100 MG tablet     simethicone (MYLICON) 40 MG/0.6ML suspension     ranitidine (ZANTAC) 150 MG/10ML syrup     sennosides (SENOKOT) 1.76 mg/mL syrup     warfarin (COUMADIN) 2.5 MG tablet     order for DME     order for DME     DiphenhydrAMINE " HCl (BENADRYL PO)     cholestyramine (QUESTRAN) 4 G Packet     multivitamins with minerals (CERTAVITE/CEROVITE) LIQD liquid     doxepin (SILENOR) 3 MG tablet     nystatin POWD     metoprolol (LOPRESSOR) 50 MG tablet     loperamide (IMODIUM) 1 MG/5ML liquid     Lactobacillus Rhamnosus, GG, (CULTURELLE PO)     Facility-Administered Medications Ordered in Other Encounters   Medication     bupivacaine 0.25 % - EPINEPHrine 1:200,000 injection        Allergies   Allergen Reactions     Amoxicillin Diarrhea     Ativan [Lorazepam] Other (See Comments)     Hallucinations     Hydromorphone Itching     4/12/17 - patient open to using this as she tolerated Hydromorphone PCA during hospitalization in January 2017.      Morphine Itching         Review of Systems   Constitutional: Positive for activity change and appetite change (patient only beginning to take some oral clear liquids). Negative for fever.   HENT: Negative for congestion.    Eyes: Negative for redness.   Respiratory: Negative for shortness of breath.    Cardiovascular: Negative for chest pain.   Gastrointestinal: Positive for abdominal pain (chronic).   Genitourinary: Negative for difficulty urinating.   Musculoskeletal: Negative for arthralgias and neck stiffness.   Skin: Positive for wound (healing well fromprevious esophageal takedown). Negative for color change.   Neurological: Negative for headaches.   Psychiatric/Behavioral: Negative for confusion, decreased concentration and dysphoric mood.   All other systems reviewed and are negative.      Physical Exam   BP: 137/67  Heart Rate: 103  Temp: 98.6  F (37  C)  Resp: 18  Height: 152.4 cm (5')  SpO2: 100 %  Physical Exam   Constitutional: She is oriented to person, place, and time. She appears well-developed and well-nourished. No distress.   Patient here with  is pleasant.   HENT:   Head: Normocephalic and atraumatic.   Eyes: Conjunctivae and EOM are normal. Pupils are equal, round, and reactive to light.  No scleral icterus.   Neck: Normal range of motion. Neck supple.   Abdominal: There is tenderness (minimal tenderness.  j-tube currently clogged there was placed by surgery a couple weeks ago).   Musculoskeletal: She exhibits no edema or tenderness.   Neurological: She is alert and oriented to person, place, and time. She has normal reflexes. No cranial nerve deficit. Coordination normal.   Skin: Skin is warm and dry. No rash noted. She is not diaphoretic. No erythema. No pallor.   Surgical wound appears to be healing without infection   Psychiatric:   Appropriate   Nursing note and vitals reviewed.      ED Course     ED Course     Procedures       2:28 PM  The patient was seen and examined by Dr. Bush in Room HWE.   Patient ER was evaluated we were able to finally flush the J-tube and is now working fine.  Patient did receive her liquid oxycodone here 10 mg she is comfortable being discharged we'll continue aggressive flushing after medications etc.  Will contact Dr. Wise's clinic this week to have an appointment on Wednesday       Critical Care time:  none               Labs Ordered and Resulted from Time of ED Arrival Up to the Time of Departure from the ED - No data to display         Assessments & Plan (with Medical Decision Making)  73 yo female recent surgery had a J-tube placed also now is clogged.  Patient value in the ER no distress we were able to unclog the J-tube and now functioning fine patient given pain medications via this flushed easily comfortably discharge will follow-up with thoracic surgery this week.           I have reviewed the nursing notes.    I have reviewed the findings, diagnosis, plan and need for follow up with the patient.    Discharge Medication List as of 4/29/2017  3:00 PM          Final diagnoses:   Malfunctioning jejunostomy tube (H)     Monika BOUDREAUX , am serving as a trained medical scribe to document services personally performed by Jusitce Bush MD, based on the  provider's statements to me.   I, Justice Bush MD, was physically present and have reviewed and verified the accuracy of this note documented by Monika Knowles.    4/29/2017   [facility], [facility] EMERGENCY DEPARTMENT    This note was created at least in part by the use of dragon voice dictation system. Inadvertent typographical errors may still exist.  Justice Bush MD.         Justice Bush MD  04/30/17 [id number]

## 2017-04-29 NOTE — ED AVS SNAPSHOT
[facility], [place], Emergency Department    [address] [address]    Phone:  [phone]                                       Minerva Blanco   MRN: [id number]    Department:  [facility], Emergency Department   Date of Visit:  4/29/2017           After Visit Summary Signature Page     I have received my discharge instructions, and my questions have been answered. I have discussed any challenges I see with this plan with the nurse or doctor.    ..........................................................................................................................................  Patient/Patient Representative Signature      ..........................................................................................................................................  Patient Representative Print Name and Relationship to Patient    ..................................................               ................................................  Date                                            Time    ..........................................................................................................................................  Reviewed by Signature/Title    ...................................................              ..............................................  Date                                                            Time

## 2017-04-30 LAB
BACTERIA SPEC CULT: NORMAL
Lab: NORMAL
MICRO REPORT STATUS: NORMAL
SPECIMEN SOURCE: NORMAL

## 2017-04-30 ASSESSMENT — ENCOUNTER SYMPTOMS
ACTIVITY CHANGE: 1
DIFFICULTY URINATING: 0
DECREASED CONCENTRATION: 0
NECK STIFFNESS: 0
CONFUSION: 0
APPETITE CHANGE: 1
COLOR CHANGE: 0
SHORTNESS OF BREATH: 0
FEVER: 0
ABDOMINAL PAIN: 1
HEADACHES: 0
ARTHRALGIAS: 0
DYSPHORIC MOOD: 0
EYE REDNESS: 0
WOUND: 1

## 2017-05-01 ENCOUNTER — CARE COORDINATION (OUTPATIENT)
Dept: CARE COORDINATION | Facility: CLINIC | Age: 72
End: 2017-05-01

## 2017-05-01 ENCOUNTER — ANTICOAGULATION THERAPY VISIT (OUTPATIENT)
Dept: ANTICOAGULATION | Facility: CLINIC | Age: 72
End: 2017-05-01

## 2017-05-01 ENCOUNTER — PRE VISIT (OUTPATIENT)
Dept: ANESTHESIOLOGY | Facility: CLINIC | Age: 72
End: 2017-05-01

## 2017-05-01 DIAGNOSIS — I48.91 ATRIAL FIBRILLATION, UNSPECIFIED TYPE (H): ICD-10-CM

## 2017-05-01 DIAGNOSIS — Z79.01 LONG-TERM (CURRENT) USE OF ANTICOAGULANTS: ICD-10-CM

## 2017-05-01 LAB — INR PPP: 1.4

## 2017-05-01 NOTE — MR AVS SNAPSHOT
Minerva JIMENEZ Francis   5/1/2017   Anticoagulation Therapy Visit    Description:  72 year old female   Provider:  Gloria Richardson RN   Department:  [facility]           INR as of 5/1/2017     Today's INR 1.4!      Anticoagulation Summary as of 5/1/2017     INR goal 2.0-3.0   Today's INR 1.4!   Full instructions 5/1: 3.75 mg; 5/2: 3.75 mg; Otherwise 1.25 mg on Wed; 2.5 mg all other days   Next INR check 5/3/2017    Indications   Long-term (current) use of anticoagulants [Z79.01] [Z79.01]  Atrial fibrillation (H) [I48.91] [I48.91]         Description     Lovenox 60 mg every 12 hours.      May 2017 Details    Sun Mon Tue Wed Thu Fri Sat      1      3.75 mg   See details      2      3.75 mg         3            4               5               6                 7               8               9               10               11               12               13                 14               15               16               17               18               19               20                 21               22               23               24               25               26               27                 28               29               30               31                   Date Details   05/01 This INR check       Date of next INR:  5/3/2017         How to take your warfarin dose     To take:  1.25 mg Take 0.5 of a 2.5 mg tablet.    To take:  3.75 mg Take 1.5 of the 2.5 mg tablets.

## 2017-05-01 NOTE — TELEPHONE ENCOUNTER
1.  Date/reason for appt: 5/4/17 2:30PM New consult for pain management  2.  Referring provider: Nicole BRUNSON   3.  Call to patient (Yes / No - short description): No, pt was referred.   4.  Previous care at / records requested from:  [facility] ED - 4/29/17 & 1/9/17   Referral - 2/1/17

## 2017-05-01 NOTE — PROGRESS NOTES
"[facility]  \"Hello, my name is Kourtney Victor , and I am calling from the [facility].  I want to check in and see how you are doing, after leaving the hospital.  You may also receive a call from your Care Coordinator (care team), but I want to make sure you don t have any urgent needs.  I have a couple questions to review with you:     Post-Discharge Outreach                                                    Minerva Blanco is a 72 year old female     Follow-up Appointments           Follow Up and recommended labs and tests       1.) Follow up with an INR draw within 3 days for primary care physician, Andrea Nino, or coumadin clinic management of postop Lovenox bridge.  2.) Follow up with Jens Wise MD in Thoracic Surgery clinic in 1 month, prior to which a CXR should be performed.   3.) Follow up with outpatient pain appointment with the [facility] ([facility]) on 05/04/17.                       Your next 10 appointments already scheduled            May 04, 2017 2:30 PM CDT   (Arrive by 2:15 PM)   New Patient Visit with Mike Enamorado [facility] ([email or web address])     [address] [place] [address] [phone]             Care Team:    Patient Care Team       Relationship Specialty Notifications Start End    Andrea Nino MD PCP - General Family Practice  3/24/17     Phone: [phone] Fax: [phone]         [facility] at  [address] [address]    Jens Wise MD MD Thoracic Diseases  9/8/16     Phone: [phone] Fax: [phone]          [facility] [address] [place] [address] [address] [facility] [address]    Toña Martinez, RN Nurse Coordinator Clinical Cardiac Electrophysiology  2/21/17     Phone: [phone] Fax: [phone]         [address] [address]    Sandra John, " APRN CNP Nurse Practitioner Nurse Practitioner  2/21/17     Phone: [phone] Fax: [phone]         [address] [address]            Transition of Care Review                                                      Did you have a surgery or procedure during your hospital visit? Yes   If yes, do you have any of the following:     Signs of infection:  No:     Pain:  Yes- 5/10         Wound/incision concerns? NO    Do you have all of your medications/refills?  Yes    Are you having any side effects or questions about your medication(s)? No    Do you have any new or worsening symptoms?  Yes- Per patient she states she has been feeling weak and hurting all over her body. Also cannot hold food down to her acid reflux. FYI    Do you have any future appointments scheduled?   YES             Plan                                                      Thanks for your time.  Your Care Coordinator may follow-up within the next couple days.  In the meantime if you have questions, concerns or problems call your care team.        Kourtney Victor

## 2017-05-01 NOTE — PROGRESS NOTES
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 no  Inpatient warfarin doses added to calendar? It appears that coumadin was held in the hospital, and restarted on 4/28 at 2.5mg  Medication changes at discharge: Dose of Neurontin changed, and started hyoscyamine, robaxin, and opium tincture.  D/C zinc sulfate.  Warfarin dosing after DC: 2.5mg   Patient discharged on Lovenox? Yes.  Lovenox 60mg every 12 hours.  Next INR date: 5/1  Where is the patient discharging to? (home, TCU, staying locally, etc.): home   Will patient have home care? yes    ANTICOAGULATION FOLLOW-UP CLINIC VISIT    Patient Name:  Minerva Blanco  Date:  5/1/2017  Contact Type:  Telephone    SUBJECTIVE:     Patient Findings     Positives Change in diet/appetite (Nutrition via J-tube continues-Isosource and 2 nell.), Other complaints    Comments Change in medications - diarrhea  See progress note.           OBJECTIVE    INR   Date Value Ref Range Status   05/01/2017 1.4  Final       ASSESSMENT / PLAN  INR assessment SUB    Recheck INR In: 2 DAYS    INR Location [facility] INR      Anticoagulation Summary as of 5/1/2017     INR goal 2.0-3.0   Today's INR 1.4!   Maintenance plan 1.25 mg (2.5 mg x 0.5) on Wed; 2.5 mg (2.5 mg x 1) all other days   Full instructions 5/1: 3.75 mg; 5/2: 3.75 mg; Otherwise 1.25 mg on Wed; 2.5 mg all other days   Weekly total 16.25 mg   Plan last modified Gloria Richardson RN (3/27/2017)   Next INR check 5/3/2017   Target end date     Indications   Long-term (current) use of anticoagulants [Z79.01] [Z79.01]  Atrial fibrillation (H) [I48.91] [I48.91]         Anticoagulation Episode Summary     INR check location     Preferred lab     Send INR reminders to [facility]    Comments as of 3/9: pt gets tube feedings @ HS - this results in diarrhea - ~ 4-7 episodes in 24  hr.  HIPPA Form received 3/28/17 ok to speak with  Richard       Anticoagulation Care Providers     Provider Role Specialty Phone number    Pedro Moreno MD Responsible Clinical Cardiac Electrophysiology [phone]            See the Encounter Report to view Anticoagulation Flowsheet and Dosing Calendar (Go to Encounters tab in chart review, and find the Anticoagulation Therapy Visit)    Spoke with Chelo, [facility] nurse, and patient.    Gloria Richardson RN

## 2017-05-02 ENCOUNTER — TELEPHONE (OUTPATIENT)
Dept: SURGERY | Facility: CLINIC | Age: 72
End: 2017-05-02

## 2017-05-02 DIAGNOSIS — G89.29 OTHER CHRONIC PAIN: ICD-10-CM

## 2017-05-02 RX ORDER — OXYCODONE HYDROCHLORIDE 5 MG/1
5-10 TABLET ORAL EVERY 4 HOURS PRN
Qty: 10 TABLET | Refills: 0 | Status: ON HOLD | OUTPATIENT
Start: 2017-05-02 | End: 2017-05-09

## 2017-05-02 NOTE — TELEPHONE ENCOUNTER
"Call from Enrique stating Minerva will be out of her oxy tonight and will also need more sleeping pills. I spoke with TOYA Joyner, who spoke with Minerva's Home Care nurse yesterday and at that time, they were on par to have enough pain medication to get her to her Pain Clinic appointment Thursday.    I spoke with the  discharge pharmacy who says Minerva was discharged with 100 tablets of oxy with instructions to take 1-2 by mouth every 4 hours as needed for pain. She was discharged with an 8.88149 day supply which should get her to at least 05/06/17.    I shared with Enrique my concern that Minerva is not taking the medication as prescribed as she should have enough to get through Friday of this week. He said she was sleeping and I told him that I needed to talk to her about how she was taking her medication. Minerva told me she was taking 1-3 tablets depending on her pain. When I asked why she was taking 3 sometimes when the directions clearly state to only take 1-2 at a time, she didn't have an answer. She also said she is \"up and down all night\" and takes the oxy to help her get back to sleep. I told her the oxy is not a sleep aid-it is a pain medication and she was prescribed a finite amount to get her to her Pain Clinic appointment so they can resume management of her chronic pain.     I will give her enough to get through her appointment with Dr. Wise tomorrow (for suture removal and wound check).   "

## 2017-05-03 ENCOUNTER — OFFICE VISIT (OUTPATIENT)
Dept: SURGERY | Facility: CLINIC | Age: 72
DRG: 177 | End: 2017-05-03
Attending: THORACIC SURGERY (CARDIOTHORACIC VASCULAR SURGERY)
Payer: MEDICARE

## 2017-05-03 ENCOUNTER — HOSPITAL ENCOUNTER (INPATIENT)
Facility: CLINIC | Age: 72
LOS: 5 days | Discharge: HOME HEALTH | DRG: 177 | End: 2017-05-09
Attending: THORACIC SURGERY (CARDIOTHORACIC VASCULAR SURGERY) | Admitting: THORACIC SURGERY (CARDIOTHORACIC VASCULAR SURGERY)
Payer: MEDICARE

## 2017-05-03 ENCOUNTER — RECORDS - HEALTHEAST (OUTPATIENT)
Dept: ADMINISTRATIVE | Facility: OTHER | Age: 72
End: 2017-05-03

## 2017-05-03 ENCOUNTER — HOSPITAL ENCOUNTER (INPATIENT)
Dept: VASCULAR ULTRASOUND | Facility: CLINIC | Age: 72
DRG: 177 | End: 2017-05-03
Attending: THORACIC SURGERY (CARDIOTHORACIC VASCULAR SURGERY) | Admitting: THORACIC SURGERY (CARDIOTHORACIC VASCULAR SURGERY)
Payer: MEDICARE

## 2017-05-03 ENCOUNTER — DOCUMENTATION ONLY (OUTPATIENT)
Dept: OTHER | Facility: CLINIC | Age: 72
End: 2017-05-03

## 2017-05-03 VITALS
RESPIRATION RATE: 18 BRPM | BODY MASS INDEX: 19.14 KG/M2 | OXYGEN SATURATION: 94 % | SYSTOLIC BLOOD PRESSURE: 100 MMHG | WEIGHT: 97.5 LBS | HEART RATE: 93 BPM | HEIGHT: 60 IN | TEMPERATURE: 98.8 F | DIASTOLIC BLOOD PRESSURE: 60 MMHG

## 2017-05-03 DIAGNOSIS — J18.9 HCAP (HEALTHCARE-ASSOCIATED PNEUMONIA): ICD-10-CM

## 2017-05-03 DIAGNOSIS — Z90.49 H/O ESOPHAGECTOMY: Primary | ICD-10-CM

## 2017-05-03 DIAGNOSIS — G89.18 ACUTE POST-OPERATIVE PAIN: ICD-10-CM

## 2017-05-03 DIAGNOSIS — R19.4 BOWEL HABIT CHANGES: ICD-10-CM

## 2017-05-03 DIAGNOSIS — R06.02 SHORTNESS OF BREATH: Primary | ICD-10-CM

## 2017-05-03 DIAGNOSIS — Z98.890 HX OF ESOPHAGECTOMY: ICD-10-CM

## 2017-05-03 DIAGNOSIS — J18.9 PNEUMONIA OF RIGHT LOWER LOBE DUE TO INFECTIOUS ORGANISM: ICD-10-CM

## 2017-05-03 DIAGNOSIS — Z98.890 H/O ESOPHAGECTOMY: Primary | ICD-10-CM

## 2017-05-03 DIAGNOSIS — Z90.49 HISTORY OF ESOPHAGECTOMY: ICD-10-CM

## 2017-05-03 DIAGNOSIS — K22.3 ESOPHAGEAL PERFORATION: Primary | ICD-10-CM

## 2017-05-03 DIAGNOSIS — Z98.890 HISTORY OF ESOPHAGECTOMY: ICD-10-CM

## 2017-05-03 DIAGNOSIS — I48.91 ATRIAL FIBRILLATION, UNSPECIFIED TYPE (H): ICD-10-CM

## 2017-05-03 DIAGNOSIS — Z79.01 LONG-TERM (CURRENT) USE OF ANTICOAGULANTS: ICD-10-CM

## 2017-05-03 DIAGNOSIS — Z90.49 HX OF ESOPHAGECTOMY: ICD-10-CM

## 2017-05-03 DIAGNOSIS — Z71.89 ADVANCED DIRECTIVES, COUNSELING/DISCUSSION: Chronic | ICD-10-CM

## 2017-05-03 DIAGNOSIS — R35.0 URINARY FREQUENCY: Primary | ICD-10-CM

## 2017-05-03 DIAGNOSIS — D64.89 ANEMIA DUE TO OTHER CAUSE: ICD-10-CM

## 2017-05-03 LAB
ANION GAP SERPL CALCULATED.3IONS-SCNC: 9 MMOL/L (ref 3–14)
BUN SERPL-MCNC: 7 MG/DL (ref 7–30)
CALCIUM SERPL-MCNC: 8.6 MG/DL (ref 8.5–10.1)
CHLORIDE SERPL-SCNC: 97 MMOL/L (ref 94–109)
CO2 SERPL-SCNC: 27 MMOL/L (ref 20–32)
CREAT SERPL-MCNC: 0.46 MG/DL (ref 0.52–1.04)
ERYTHROCYTE [DISTWIDTH] IN BLOOD BY AUTOMATED COUNT: 16.4 % (ref 10–15)
FLUAV+FLUBV AG SPEC QL: ABNORMAL
FLUAV+FLUBV AG SPEC QL: ABNORMAL
GFR SERPL CREATININE-BSD FRML MDRD: ABNORMAL ML/MIN/1.7M2
GLUCOSE SERPL-MCNC: 90 MG/DL (ref 70–99)
HCT VFR BLD AUTO: 28.6 % (ref 35–47)
HGB BLD-MCNC: 9.2 G/DL (ref 11.7–15.7)
INR PPP: 2.38 (ref 0.86–1.14)
MAGNESIUM SERPL-MCNC: 2.1 MG/DL (ref 1.6–2.3)
MCH RBC QN AUTO: 27.6 PG (ref 26.5–33)
MCHC RBC AUTO-ENTMCNC: 32.2 G/DL (ref 31.5–36.5)
MCV RBC AUTO: 86 FL (ref 78–100)
PHOSPHATE SERPL-MCNC: 3.1 MG/DL (ref 2.5–4.5)
PLATELET # BLD AUTO: 836 10E9/L (ref 150–450)
POTASSIUM SERPL-SCNC: 3.4 MMOL/L (ref 3.4–5.3)
RBC # BLD AUTO: 3.33 10E12/L (ref 3.8–5.2)
SODIUM SERPL-SCNC: 133 MMOL/L (ref 133–144)
SPECIMEN SOURCE: ABNORMAL
WBC # BLD AUTO: 17.5 10E9/L (ref 4–11)

## 2017-05-03 PROCEDURE — 25000125 ZZHC RX 250: Performed by: STUDENT IN AN ORGANIZED HEALTH CARE EDUCATION/TRAINING PROGRAM

## 2017-05-03 PROCEDURE — 25000132 ZZH RX MED GY IP 250 OP 250 PS 637: Mod: GY | Performed by: STUDENT IN AN ORGANIZED HEALTH CARE EDUCATION/TRAINING PROGRAM

## 2017-05-03 PROCEDURE — C1751 CATH, INF, PER/CENT/MIDLINE: HCPCS

## 2017-05-03 PROCEDURE — 96374 THER/PROPH/DIAG INJ IV PUSH: CPT

## 2017-05-03 PROCEDURE — 99212 OFFICE O/P EST SF 10 MIN: CPT | Mod: ZF

## 2017-05-03 PROCEDURE — 85610 PROTHROMBIN TIME: CPT | Performed by: THORACIC SURGERY (CARDIOTHORACIC VASCULAR SURGERY)

## 2017-05-03 PROCEDURE — A9270 NON-COVERED ITEM OR SERVICE: HCPCS

## 2017-05-03 PROCEDURE — 96375 TX/PRO/DX INJ NEW DRUG ADDON: CPT

## 2017-05-03 PROCEDURE — 25000128 H RX IP 250 OP 636: Performed by: STUDENT IN AN ORGANIZED HEALTH CARE EDUCATION/TRAINING PROGRAM

## 2017-05-03 PROCEDURE — 25000132 ZZH RX MED GY IP 250 OP 250 PS 637

## 2017-05-03 PROCEDURE — 87804 INFLUENZA ASSAY W/OPTIC: CPT | Performed by: STUDENT IN AN ORGANIZED HEALTH CARE EDUCATION/TRAINING PROGRAM

## 2017-05-03 PROCEDURE — 40000556 ZZH STATISTIC PERIPHERAL IV START W US GUIDANCE

## 2017-05-03 PROCEDURE — 25000132 ZZH RX MED GY IP 250 OP 250 PS 637: Mod: GY | Performed by: THORACIC SURGERY (CARDIOTHORACIC VASCULAR SURGERY)

## 2017-05-03 PROCEDURE — G0378 HOSPITAL OBSERVATION PER HR: HCPCS

## 2017-05-03 PROCEDURE — 36569 INSJ PICC 5 YR+ W/O IMAGING: CPT

## 2017-05-03 PROCEDURE — A9270 NON-COVERED ITEM OR SERVICE: HCPCS | Mod: GY | Performed by: THORACIC SURGERY (CARDIOTHORACIC VASCULAR SURGERY)

## 2017-05-03 PROCEDURE — 80048 BASIC METABOLIC PNL TOTAL CA: CPT | Performed by: THORACIC SURGERY (CARDIOTHORACIC VASCULAR SURGERY)

## 2017-05-03 PROCEDURE — A9270 NON-COVERED ITEM OR SERVICE: HCPCS | Mod: GY | Performed by: STUDENT IN AN ORGANIZED HEALTH CARE EDUCATION/TRAINING PROGRAM

## 2017-05-03 PROCEDURE — 83735 ASSAY OF MAGNESIUM: CPT | Performed by: THORACIC SURGERY (CARDIOTHORACIC VASCULAR SURGERY)

## 2017-05-03 PROCEDURE — 85027 COMPLETE CBC AUTOMATED: CPT | Performed by: THORACIC SURGERY (CARDIOTHORACIC VASCULAR SURGERY)

## 2017-05-03 PROCEDURE — 84100 ASSAY OF PHOSPHORUS: CPT | Performed by: THORACIC SURGERY (CARDIOTHORACIC VASCULAR SURGERY)

## 2017-05-03 RX ORDER — DIPHENHYDRAMINE HCL 25 MG
25 CAPSULE ORAL
Status: DISCONTINUED | OUTPATIENT
Start: 2017-05-03 | End: 2017-05-09 | Stop reason: HOSPADM

## 2017-05-03 RX ORDER — TRAZODONE HYDROCHLORIDE 50 MG/1
50 TABLET, FILM COATED ORAL AT BEDTIME
Status: DISCONTINUED | OUTPATIENT
Start: 2017-05-03 | End: 2017-05-05

## 2017-05-03 RX ORDER — PANTOPRAZOLE SODIUM 40 MG/1
40 TABLET, DELAYED RELEASE ORAL EVERY MORNING
Status: DISCONTINUED | OUTPATIENT
Start: 2017-05-04 | End: 2017-05-03

## 2017-05-03 RX ORDER — HYOSCYAMINE SULFATE 0.125 MG
125 TABLET ORAL 3 TIMES DAILY
Status: DISCONTINUED | OUTPATIENT
Start: 2017-05-03 | End: 2017-05-08

## 2017-05-03 RX ORDER — OXYCODONE HCL 5 MG/5 ML
5-10 SOLUTION, ORAL ORAL EVERY 4 HOURS PRN
Status: DISCONTINUED | OUTPATIENT
Start: 2017-05-03 | End: 2017-05-03

## 2017-05-03 RX ORDER — CHOLESTYRAMINE 4 G/9G
1 POWDER, FOR SUSPENSION ORAL DAILY
Status: DISCONTINUED | OUTPATIENT
Start: 2017-05-03 | End: 2017-05-09 | Stop reason: HOSPADM

## 2017-05-03 RX ORDER — ALBUTEROL SULFATE 0.83 MG/ML
2.5 SOLUTION RESPIRATORY (INHALATION)
Status: DISCONTINUED | OUTPATIENT
Start: 2017-05-03 | End: 2017-05-09 | Stop reason: HOSPADM

## 2017-05-03 RX ORDER — METHOCARBAMOL 500 MG/1
500 TABLET, FILM COATED ORAL 4 TIMES DAILY
Status: DISCONTINUED | OUTPATIENT
Start: 2017-05-03 | End: 2017-05-03

## 2017-05-03 RX ORDER — SIMETHICONE 40MG/0.6ML
40 SUSPENSION, DROPS(FINAL DOSAGE FORM)(ML) ORAL EVERY 6 HOURS PRN
Status: DISCONTINUED | OUTPATIENT
Start: 2017-05-03 | End: 2017-05-09 | Stop reason: HOSPADM

## 2017-05-03 RX ORDER — DIPHENHYDRAMINE HCL 25 MG
25 CAPSULE ORAL
Status: DISCONTINUED | OUTPATIENT
Start: 2017-05-03 | End: 2017-05-03

## 2017-05-03 RX ORDER — MORPHINE 10 MG/ML
0.6 TINCTURE ORAL EVERY 6 HOURS
Status: DISCONTINUED | OUTPATIENT
Start: 2017-05-03 | End: 2017-05-03

## 2017-05-03 RX ORDER — LIDOCAINE 50 MG/G
1 PATCH TOPICAL
Status: DISCONTINUED | OUTPATIENT
Start: 2017-05-03 | End: 2017-05-09 | Stop reason: HOSPADM

## 2017-05-03 RX ORDER — GABAPENTIN 250 MG/5ML
300 SOLUTION ORAL EVERY 8 HOURS
Status: DISCONTINUED | OUTPATIENT
Start: 2017-05-03 | End: 2017-05-09 | Stop reason: HOSPADM

## 2017-05-03 RX ORDER — LOPERAMIDE HYDROCHLORIDE 1 MG/5ML
4 SOLUTION ORAL 4 TIMES DAILY PRN
Status: DISCONTINUED | OUTPATIENT
Start: 2017-05-03 | End: 2017-05-09 | Stop reason: HOSPADM

## 2017-05-03 RX ORDER — OXYCODONE HCL 5 MG/5 ML
5-10 SOLUTION, ORAL ORAL
Status: DISCONTINUED | OUTPATIENT
Start: 2017-05-03 | End: 2017-05-04

## 2017-05-03 RX ORDER — GUAR GUM
1 PACKET (EA) ORAL 2 TIMES DAILY
Status: DISCONTINUED | OUTPATIENT
Start: 2017-05-03 | End: 2017-05-05

## 2017-05-03 RX ORDER — LIDOCAINE 50 MG/G
1 PATCH TOPICAL EVERY 24 HOURS
Status: DISCONTINUED | OUTPATIENT
Start: 2017-05-03 | End: 2017-05-03

## 2017-05-03 RX ORDER — NALOXONE HYDROCHLORIDE 0.4 MG/ML
.1-.4 INJECTION, SOLUTION INTRAMUSCULAR; INTRAVENOUS; SUBCUTANEOUS
Status: DISCONTINUED | OUTPATIENT
Start: 2017-05-03 | End: 2017-05-09 | Stop reason: HOSPADM

## 2017-05-03 RX ORDER — MORPHINE 10 MG/ML
0.6 TINCTURE ORAL EVERY 6 HOURS
Status: DISCONTINUED | OUTPATIENT
Start: 2017-05-03 | End: 2017-05-09 | Stop reason: HOSPADM

## 2017-05-03 RX ORDER — METOPROLOL TARTRATE 25 MG/1
25 TABLET, FILM COATED ORAL 2 TIMES DAILY
Status: DISCONTINUED | OUTPATIENT
Start: 2017-05-03 | End: 2017-05-08

## 2017-05-03 RX ORDER — DOXEPIN HYDROCHLORIDE 10 MG/ML
3 SOLUTION ORAL
Status: DISCONTINUED | OUTPATIENT
Start: 2017-05-03 | End: 2017-05-03

## 2017-05-03 RX ORDER — LIDOCAINE 40 MG/G
CREAM TOPICAL
Status: DISCONTINUED | OUTPATIENT
Start: 2017-05-03 | End: 2017-05-09 | Stop reason: HOSPADM

## 2017-05-03 RX ORDER — PIPERACILLIN SODIUM, TAZOBACTAM SODIUM 4; .5 G/20ML; G/20ML
4.5 INJECTION, POWDER, LYOPHILIZED, FOR SOLUTION INTRAVENOUS EVERY 6 HOURS
Status: DISCONTINUED | OUTPATIENT
Start: 2017-05-03 | End: 2017-05-06

## 2017-05-03 RX ORDER — LEVOFLOXACIN 5 MG/ML
750 INJECTION, SOLUTION INTRAVENOUS EVERY 24 HOURS
Status: DISCONTINUED | OUTPATIENT
Start: 2017-05-03 | End: 2017-05-09 | Stop reason: HOSPADM

## 2017-05-03 RX ORDER — GABAPENTIN 250 MG/5ML
300 SOLUTION ORAL EVERY 8 HOURS
Status: DISCONTINUED | OUTPATIENT
Start: 2017-05-03 | End: 2017-05-03

## 2017-05-03 RX ORDER — METHOCARBAMOL 500 MG/1
500 TABLET, FILM COATED ORAL 4 TIMES DAILY
Status: DISCONTINUED | OUTPATIENT
Start: 2017-05-03 | End: 2017-05-09 | Stop reason: HOSPADM

## 2017-05-03 RX ORDER — DOXEPIN HYDROCHLORIDE 10 MG/ML
3 SOLUTION ORAL
Status: DISCONTINUED | OUTPATIENT
Start: 2017-05-03 | End: 2017-05-09 | Stop reason: HOSPADM

## 2017-05-03 RX ORDER — WARFARIN SODIUM 2.5 MG/1
2.5 TABLET ORAL DAILY
Status: DISCONTINUED | OUTPATIENT
Start: 2017-05-03 | End: 2017-05-03

## 2017-05-03 RX ORDER — HEPARIN SODIUM,PORCINE 10 UNIT/ML
2-5 VIAL (ML) INTRAVENOUS
Status: COMPLETED | OUTPATIENT
Start: 2017-05-03 | End: 2017-05-05

## 2017-05-03 RX ORDER — LOPERAMIDE HYDROCHLORIDE 1 MG/5ML
4 SOLUTION ORAL 4 TIMES DAILY PRN
Status: DISCONTINUED | OUTPATIENT
Start: 2017-05-03 | End: 2017-05-03

## 2017-05-03 RX ORDER — HYOSCYAMINE SULFATE 0.125 MG
125 TABLET ORAL 3 TIMES DAILY
Status: DISCONTINUED | OUTPATIENT
Start: 2017-05-03 | End: 2017-05-03

## 2017-05-03 RX ADMIN — MORPHINE 6 MG: 10 TINCTURE ORAL at 13:58

## 2017-05-03 RX ADMIN — OXYCODONE HYDROCHLORIDE 10 MG: 5 SOLUTION ORAL at 13:58

## 2017-05-03 RX ADMIN — METOPROLOL TARTRATE 25 MG: 25 TABLET ORAL at 20:40

## 2017-05-03 RX ADMIN — LIDOCAINE 1 PATCH: 50 PATCH CUTANEOUS at 13:50

## 2017-05-03 RX ADMIN — PIPERACILLIN SODIUM,TAZOBACTAM SODIUM 4.5 G: 4; .5 INJECTION, POWDER, FOR SOLUTION INTRAVENOUS at 18:31

## 2017-05-03 RX ADMIN — OXYCODONE HYDROCHLORIDE 10 MG: 5 SOLUTION ORAL at 17:22

## 2017-05-03 RX ADMIN — LEVOFLOXACIN 750 MG: 5 INJECTION, SOLUTION INTRAVENOUS at 15:55

## 2017-05-03 RX ADMIN — GABAPENTIN 300 MG: 250 SOLUTION ORAL at 20:36

## 2017-05-03 RX ADMIN — ACETAMINOPHEN 975 MG: 160 SUSPENSION ORAL at 20:36

## 2017-05-03 RX ADMIN — PIPERACILLIN SODIUM,TAZOBACTAM SODIUM 4.5 G: 4; .5 INJECTION, POWDER, FOR SOLUTION INTRAVENOUS at 13:47

## 2017-05-03 RX ADMIN — METHOCARBAMOL 500 MG: 500 TABLET ORAL at 20:40

## 2017-05-03 RX ADMIN — TRAZODONE HYDROCHLORIDE 50 MG: 50 TABLET ORAL at 22:15

## 2017-05-03 RX ADMIN — Medication 6 MG: at 20:36

## 2017-05-03 RX ADMIN — GABAPENTIN 300 MG: 250 SOLUTION ORAL at 13:51

## 2017-05-03 RX ADMIN — PANTOPRAZOLE SODIUM 40 MG: 40 TABLET, DELAYED RELEASE ORAL at 20:44

## 2017-05-03 RX ADMIN — WARFARIN SODIUM 1.25 MG: 2.5 TABLET ORAL at 18:31

## 2017-05-03 RX ADMIN — LIDOCAINE HYDROCHLORIDE 5 ML: 10 INJECTION, SOLUTION INFILTRATION; PERINEURAL at 15:26

## 2017-05-03 RX ADMIN — RANITIDINE HYDROCHLORIDE 150 MG: 15 SOLUTION ORAL at 20:35

## 2017-05-03 RX ADMIN — HYOSCYAMINE SULFATE 125 MCG: 0.12 TABLET ORAL at 20:36

## 2017-05-03 RX ADMIN — OXYCODONE HYDROCHLORIDE 10 MG: 5 SOLUTION ORAL at 20:35

## 2017-05-03 RX ADMIN — METHOCARBAMOL 500 MG: 500 TABLET ORAL at 15:55

## 2017-05-03 RX ADMIN — HYOSCYAMINE SULFATE 125 MCG: 0.12 TABLET ORAL at 13:53

## 2017-05-03 RX ADMIN — MULTIVITAMIN 15 ML: LIQUID ORAL at 13:58

## 2017-05-03 ASSESSMENT — PAIN SCALES - GENERAL: PAINLEVEL: EXTREME PAIN (8)

## 2017-05-03 NOTE — IP AVS SNAPSHOT
MRN:[id number]                      After Visit Summary   5/3/2017    Minerva Blanco    MRN: [id number]           Thank you!     Thank you for choosing [facility] for your care. Our goal is always to provide you with excellent care. Hearing back from our patients is one way we can continue to improve our services. Please take a few minutes to complete the written survey that you may receive in the mail after you visit with us. Thank you!        Patient Information     Date Of Birth          1945        Designated Caregiver       Most Recent Value    Caregiver    Will someone help with your care after discharge? yes    Name of designated caregiver Enrique    Phone number of caregiver [phone]    Caregiver address [address] [facility] [address]      About your hospital stay     You were admitted on:  May 3, 2017 You last received care in the:  [address] [facility]    You were discharged on:  May 9, 2017       Who to Call     For medical emergencies, please call 911.  For non-urgent questions about your medical care, please call your primary care provider or clinic, [phone]          Attending Provider     Provider Specialty    Jens Wise MD Thoracic Diseases       Primary Care Provider Office Phone # Fax #    Andrea Nino [phone]       [facility] [address] [address]        After Care Instructions     Discharge Instructions       DIET: Full liquid diet.  Soft mechanical in 5-7 days if doing well.  With the soft diet chew very well and take 4-5 small meals per day, or more if necessary. Do not take food by mouth after 6pm to minimize reflux.    You will continue tube feeds until follow up, so food by mouth is primarily for pleasure.  Transition to each new diet stage and introduce new foods slowly to  how well you tolerate them.    Follow instructions provided by the dietician and the speech therapist    Sleep  with the head of your bed elevated to help prevent reflux, which is common after esophagectomy    If your travel plans upon discharge include prolonged periods of sitting (a lengthy car or plane ride), it is highly beneficial to get up and walk at least once per hour to help prevent swelling and blood clots.     You may get incision wet 2 days after operation. Do not submerge, soak, or scrub incision or swim until seen in follow-up.    Take incentive spirometer home for continued frequent use    Activity as tolerated, no strenous activity until seen in follow-up, no lifting greater than 10 pounds for the next 2-4 weeks.    Stay hydrated. Take over the counter fiber (metamucil or benefiber) and stool softeners (Miralax, docusate or senna) if becoming constipated.     Call for fever greater than 101.5, chills, increased size of incision, red skin around incision, vision changes, muscle strength changes, sensation changes, shortness of breath, or other concerns.    No driving while taking narcotic pain medication.    Transition to ibuprofen or tylenol/acetaminophen for pain control. Do not take tylenol/acetaminophen and acetaminophen containing narcotic (e.g., percocet or vicodin) at the same time. If you have known ulcer problems, or kidney trouble (elevated creatinine) do not take the ibuprofen.    In emergencies, call 911    For other Questions or Concerns;   A.) During weekday working hours (Monday through Friday 8am to 4:30pm) call [phone] and ask to speak to a clinical nurse specialist.     B.) At nights (after 4:30pm), on weekends, or if urgent call [phone] and tell the   I would like to page job code [phone], the thoracic surgery fellow on call, please.                   Follow-up Appointments     Follow Up and recommended labs and tests       1.) Follow up with Jens Wise MD in Thoracic Surgery clinic in 2 weeks, prior to which a CXR, BMP, nutrition labs, and CBC should be performed.    2.) Follow up with the pain clinic within one week for further management of acute on chronic pain.                  Your next 10 appointments already scheduled     May 10, 2017 12:10 PM CDT   (Arrive by 11:55 AM)   New Patient Visit with Mike Enamorado DO   [facility] ([facility])    [address]  4th Floor  [facility] [id number] [phone]              Additional Services     Home care nursing referral       Resumption of Home Care Services  _______________________  [facility]  Phone  [phone]  Fax  [phone]  ______________________    Resumption of Home Care Services  RN skilled nursing visit. RN to assess vital signs and weight, respiratory and cardiac status, pain level and activity tolerance, incision for signs/symptoms of infection, hydration, nutrition and bowel status and home safety.  RN to teach medication management.  RN to provide tube site care and management.  RN to provide INR checks. Report INR levels to the [facility] at Phone: [phone] Fax: [phone]    PT/OT to eval and treat    Your provider has ordered home care nursing services. If you have not been contacted within 2 days of your discharge please call the inpatient department phone number at [phone] .            Home infusion referral       Resumption of Home Care Services    Your provider has referred you to: [facility]: [facility] [place] [phone]   [email or web address]    Local Address (if different from home address): N/A    Anticipated Length of Therapy: Per MD Order  Enteral Nutrition via J tube  Peptamen 1.5 @ 45ml/hr continuous  500ml-600ml water daily      Home Infusion Pharmacist to adjust therapy based on labs and clinical assessments: Yes    Labs:  May draw labs from Venous Catheter: Yes  Home Infusion Pharmacist to order labs  "based on therapy type and clinical assessments: Yes    Agency Staff to assess nursing needs for Infusion Therapy.                  Future tests that were ordered for you     Albumin level           Basic metabolic panel           CBC with platelets       Last Lab Result: Hemoglobin (g/dL)       Date                     Value                 2017               9.8 (L)          ----------            Prealbumin           XR Chest 2 Views                 Pending Results     Date and Time Order Name Status Description    5/3/2017 1318 IR PICC Vascular In process             Statement of Approval     Ordered          17 1133  I have reviewed and agree with all the recommendations and orders detailed in this document.  EFFECTIVE NOW     Approved and electronically signed by:  Saul Rogers PA-C             Admission Information     Date & Time Provider Department Dept. Phone    5/3/2017 Jens Wise MD [address] [facility] [phone]      Your Vitals Were     Blood Pressure Pulse Temperature Respirations Height Weight    134/64 (BP Location: Right arm) 76 97.9  F (36.6  C) (Oral) 16 1.524 m (5') 47.9 kg (105 lb 11.2 oz)    Pulse Oximetry BMI (Body Mass Index)                97% 20.64 kg/m2          [facility] Information     [facility] lets you send messages to your doctor, view your test results, renew your prescriptions, schedule appointments and more. To sign up, go to [email or web address] . Click on \"Log in\" on the left side of the screen, which will take you to the Welcome page. Then click on \"Sign up Now\" on the right side of the page.     You will be asked to enter the access code listed below, as well as some personal information. Please follow the directions to create your username and password.     Your access code is: [email or web address]  Expires: 2017 11:41 AM     Your access code will  in 90 days. If you need help or a new code, please call your [facility] or " [phone].        Care EveryWhere ID     This is your Care EveryWhere ID. This could be used by other organizations to access your [facility] medical records  [id number]           Review of your medicines      START taking        Dose / Directions    diphenoxylate-atropine 0.5-0.005 mg/mL liquid   Commonly known as:  LOMOTIL   Used for:  Bowel habit changes        Dose:  5 mL   Take 5 mLs by mouth 4 times daily as needed for diarrhea   Quantity:  300 mL   Refills:  0       ferrous sulfate 300 (60 FE) MG/5ML syrup   Used for:  Anemia due to other cause        Dose:  300 mg   5 mLs (300 mg) by Per J Tube route daily   Quantity:  150 mL   Refills:  0       levofloxacin 500 MG tablet   Commonly known as:  LEVAQUIN   Used for:  HCAP (healthcare-associated pneumonia)        Dose:  500 mg   Take 1 tablet (500 mg) by mouth daily for 7 days   Quantity:  7 tablet   Refills:  0       metoprolol 10 mg/mL Susp   Commonly known as:  LOPRESSOR   Used for:  Atrial fibrillation, unspecified type (H)   Replaces:  metoprolol 50 MG tablet        Dose:  25 mg   2.5 mLs (25 mg) by Per J Tube route 2 times daily   Refills:  0         CONTINUE these medicines which may have CHANGED, or have new prescriptions. If we are uncertain of the size of tablets/capsules you have at home, strength may be listed as something that might have changed.        Dose / Directions    fiber modular packet   This may have changed:  when to take this   Used for:  History of esophagectomy        Dose:  1 packet   1 packet by Per Feeding Tube route 3 times daily (with meals)   Quantity:  60 packet   Refills:  0       loperamide 1 mg/5 mL liquid   Commonly known as:  IMODIUM   This may have changed:  when to take this   Used for:  Bowel habit changes        Dose:  4 mg   Take 20 mLs (4 mg) by mouth 4 times daily as needed for diarrhea   Quantity:  200 mL   Refills:  0       oxyCODONE 5 MG/5ML solution   Commonly known as:  ROXICODONE   This may have changed:     - how much to take  - how to take this  - when to take this  - Another medication with the same name was removed. Continue taking this medication, and follow the directions you see here.   Used for:  Acute post-operative pain        Dose:  10-15 mg   10-15 mLs (10-15 mg) by Oral or Feeding Tube route every 3 hours as needed for moderate to severe pain   Quantity:  700 mL   Refills:  0       traZODone 100 MG tablet   Commonly known as:  DESYREL   This may have changed:  how much to take   Used for:  Insomnia, unspecified type        Dose:  50 mg   0.5 tablets (50 mg) by Per G Tube route At Bedtime   Quantity:  30 tablet   Refills:  0         CONTINUE these medicines which have NOT CHANGED        Dose / Directions    acetaminophen 32 mg/mL solution   Commonly known as:  TYLENOL   Used for:  Acute post-operative pain        Dose:  975 mg   Take 30.45 mLs (975 mg) by mouth every 8 hours for 15 days   Quantity:  1370.25 mL   Refills:  0       BENADRYL PO        Dose:  25 mg   Take 25 mg by mouth nightly as needed   Refills:  0       cholestyramine 4 G Packet   Commonly known as:  QUESTRAN        Dose:  1 packet   Take 1 packet by mouth daily   Refills:  0       CULTURELLE PO        Dose:  1 tablet   Take 1 tablet by mouth 2 times daily   Refills:  0       gabapentin 250 MG/5ML solution   Commonly known as:  NEURONTIN   Used for:  Acute post-operative pain        Dose:  300 mg   Take 6 mLs (300 mg) by mouth every 8 hours   Quantity:  450 mL   Refills:  0       multivitamins with minerals Liqd liquid        Dose:  15 mL   Take 15 mLs by mouth daily   Refills:  0       opium tincture 10 mg/mL (1%) liquid   Used for:  Bowel habit changes        Dose:  0.6 mL   Take 0.6 mLs (6 mg) by mouth every 6 hours   Quantity:  118 mL   Refills:  0       * order for DME   Used for:  Hx of esophagectomy        Equipment being ordered:  [facility] Suction Machine-Intermittent Suction Canisters(2) Suction Canister Holders(2) Suction Connect  Tube(2) 5 in 1 Connector(2) Bacteria Filter(2) Yaunkauer Suction(2)  Treatment Diagnosis: Esophogeal Perforation, s/p esophagectomy   Quantity:  1 Device   Refills:  0       * order for DME   Used for:  Esophageal perforation        Equipment being ordered:  Suction Pump Suction Canister(2) Suction Tubing(2) Bacteria Filter(2) Yaunkauer(4) Red Rubber Catheter(2)  Treatment Diagnosis: Esophogeal Perforation, s/p esophagectomy   Quantity:  1 Device   Refills:  0       ranitidine 150 MG/10ML syrup   Commonly known as:  Zantac   Used for:  Hx of esophagectomy        Dose:  150 mg   Take 10 mLs (150 mg) by mouth 2 times daily   Quantity:  600 mL   Refills:  0       simethicone 40 MG/0.6ML suspension   Commonly known as:  MYLICON   Used for:  Abdominal cramping        Dose:  40 mg   0.6 mLs (40 mg) by Per Feeding Tube route every 6 hours as needed for cramping   Quantity:  30 mL   Refills:  0       warfarin 2.5 MG tablet   Commonly known as:  COUMADIN   Used for:  Paroxysmal atrial fibrillation (H)        Dose:  2.5 mg   Take 1 tablet (2.5 mg) by mouth daily   Quantity:  30 tablet   Refills:  1       * Notice:  This list has 2 medication(s) that are the same as other medications prescribed for you. Read the directions carefully, and ask your doctor or other care provider to review them with you.      STOP taking     enoxaparin 60 MG/0.6ML injection   Commonly known as:  LOVENOX           hyoscyamine 0.125 MG tablet   Commonly known as:  ANASPAZ/LEVSIN           metoprolol 50 MG tablet   Commonly known as:  LOPRESSOR   Replaced by:  metoprolol 10 mg/mL Susp                Where to get your medicines      These medications were sent to [id number] [place] [place] - [address] [facility]  [address], [place] [address]     Phone:  [phone]     ferrous sulfate 300 (60 FE) MG/5ML syrup    levofloxacin 500 MG tablet         Some of these will need a paper prescription and others can be bought over the  counter. Ask your nurse if you have questions.     Bring a paper prescription for each of these medications     diphenoxylate-atropine 0.5-0.005 mg/mL liquid    opium tincture 10 mg/mL (1%) liquid    oxyCODONE 5 MG/5ML solution       You don't need a prescription for these medications     metoprolol 10 mg/mL Susp                Protect others around you: Learn how to safely use, store and throw away your medicines at www.disposemymeds.org.             Medication List: This is a list of all your medications and when to take them. Check marks below indicate your daily home schedule. Keep this list as a reference.      Medications           Morning Afternoon Evening Bedtime As Needed    acetaminophen 32 mg/mL solution   Commonly known as:  TYLENOL   Take 30.45 mLs (975 mg) by mouth every 8 hours for 15 days   Last time this was given:  975 mg on 5/9/2017 12:07 PM                                BENADRYL PO   Take 25 mg by mouth nightly as needed                                cholestyramine 4 G Packet   Commonly known as:  QUESTRAN   Take 1 packet by mouth daily                                CULTURELLE PO   Take 1 tablet by mouth 2 times daily                                diphenoxylate-atropine 0.5-0.005 mg/mL liquid   Commonly known as:  LOMOTIL   Take 5 mLs by mouth 4 times daily as needed for diarrhea   Last time this was given:  5 mLs on 5/9/2017 10:13 AM                                ferrous sulfate 300 (60 FE) MG/5ML syrup   5 mLs (300 mg) by Per J Tube route daily   Last time this was given:  300 mg on 5/9/2017  9:48 AM                                fiber modular packet   1 packet by Per Feeding Tube route 3 times daily (with meals)   Last time this was given:  1 packet on 5/9/2017 10:13 AM                                gabapentin 250 MG/5ML solution   Commonly known as:  NEURONTIN   Take 6 mLs (300 mg) by mouth every 8 hours   Last time this was given:  300 mg on 5/9/2017 12:07 PM                                 levofloxacin 500 MG tablet   Commonly known as:  LEVAQUIN   Take 1 tablet (500 mg) by mouth daily for 7 days                                loperamide 1 mg/5 mL liquid   Commonly known as:  IMODIUM   Take 20 mLs (4 mg) by mouth 4 times daily as needed for diarrhea   Last time this was given:  4 mg on 5/8/2017  3:35 PM                                metoprolol 10 mg/mL Susp   Commonly known as:  LOPRESSOR   2.5 mLs (25 mg) by Per J Tube route 2 times daily   Last time this was given:  25 mg on 5/9/2017  9:45 AM                                multivitamins with minerals Liqd liquid   Take 15 mLs by mouth daily   Last time this was given:  15 mLs on 5/9/2017  9:49 AM                                opium tincture 10 mg/mL (1%) liquid   Take 0.6 mLs (6 mg) by mouth every 6 hours   Last time this was given:  6 mg on 5/9/2017 10:13 AM                                * order for DME   Equipment being ordered:  [facility] Suction Machine-Intermittent Suction Canisters(2) Suction Canister Holders(2) Suction Connect Tube(2) 5 in 1 Connector(2) Bacteria Filter(2) Yaunkauer Suction(2)  Treatment Diagnosis: Esophogeal Perforation, s/p esophagectomy                                * order for DME   Equipment being ordered:  Suction Pump Suction Canister(2) Suction Tubing(2) Bacteria Filter(2) Yaunkauer(4) Red Rubber Catheter(2)  Treatment Diagnosis: Esophogeal Perforation, s/p esophagectomy                                oxyCODONE 5 MG/5ML solution   Commonly known as:  ROXICODONE   10-15 mLs (10-15 mg) by Oral or Feeding Tube route every 3 hours as needed for moderate to severe pain   Last time this was given:  10 mg on 5/9/2017 10:15 AM                                ranitidine 150 MG/10ML syrup   Commonly known as:  Zantac   Take 10 mLs (150 mg) by mouth 2 times daily   Last time this was given:  150 mg on 5/6/2017  8:20 AM                                simethicone 40 MG/0.6ML suspension   Commonly known as:  MYLICON   0.6 mLs  (40 mg) by Per Feeding Tube route every 6 hours as needed for cramping                                traZODone 100 MG tablet   Commonly known as:  DESYREL   0.5 tablets (50 mg) by Per G Tube route At Bedtime   Last time this was given:  75 mg on 5/7/2017  9:54 PM                                warfarin 2.5 MG tablet   Commonly known as:  COUMADIN   Take 1 tablet (2.5 mg) by mouth daily   Last time this was given:  2.5 mg on 5/8/2017  6:39 PM                                * Notice:  This list has 2 medication(s) that are the same as other medications prescribed for you. Read the directions carefully, and ask your doctor or other care provider to review them with you.

## 2017-05-03 NOTE — PLAN OF CARE
Problem: Individualization  Goal: Patient Preferences  Pt picked up for picc line placement at 1430.

## 2017-05-03 NOTE — PHARMACY-ANTICOAGULATION SERVICE
Clinical Pharmacy - Warfarin Dosing Consult     Pharmacy has been consulted to manage this patient s warfarin therapy.  Indication: Atrial Fibrillation  Therapy Goal: INR 2-3  Warfarin Prior to Admission: Yes  Warfarin PTA Regimen: 1.25 mg on Wed; 2.5 mg all other days  Significant drug interactions: acetaminophen, levofloxacin, Zosyn, doxepin, ranitidine (all with increased risk of bleeding); cholestyramine (may result in decreased effect of warfarin)    INR   Date Value Ref Range Status   05/03/2017 2.38 (H) 0.86 - 1.14 Final   05/01/2017 1.4  Final       Recommend warfarin 1.25 mg today.  Pharmacy will monitor Minerva Blanco daily and order warfarin doses to achieve specified goal.      Please contact pharmacy as soon as possible if the warfarin needs to be held for a procedure or if the warfarin goals change.      Crystal Ruiz, MandyD

## 2017-05-03 NOTE — NURSING NOTE
Oncology Rooming Note    May 3, 2017 7:35 AM   Minerva Blanco is a 72 year old female who presents for:    Chief Complaint   Patient presents with     Oncology Clinic Visit     esophagectomy F/U     Initial Vitals: /60 (BP Location: Left arm, Patient Position: Chair, Cuff Size: Adult Small)  Pulse 93  Temp 98.8  F (37.1  C) (Oral)  Resp 18  Ht 1.524 m (5')  Wt 44.2 kg (97 lb 8 oz)  SpO2 94%  BMI 19.04 kg/m2 Estimated body mass index is 19.04 kg/(m^2) as calculated from the following:    Height as of this encounter: 1.524 m (5').    Weight as of this encounter: 44.2 kg (97 lb 8 oz). Body surface area is 1.37 meters squared.  Extreme Pain (8) Comment: chest, back and shoulders   No LMP recorded. Patient is postmenopausal.  Allergies reviewed: Yes  Medications reviewed: Yes    Medications: Medication refills not needed today.  Pharmacy name entered into Location [facility]: [id number] [place] [place] - [facility]    Clinical concerns: She stated she is here to have stiches removed.  provider was notified.    7 minutes for nursing intake (face to face time)     Delma Barraza CMA

## 2017-05-03 NOTE — PROGRESS NOTES
"CLINICAL NUTRITION SERVICES - ASSESSMENT NOTE     Nutrition Prescription    RECOMMENDATIONS FOR MDs/PROVIDERS TO ORDER:  - None at this time    Malnutrition Status:    - Severe malnutrition in the context of acute illness    Recommendations already ordered by Registered Dietitian (RD):  - Order 2 cans TwoCal + 2 cans Isosource 1.5 (pt's preference and per home regimen). Begin @ 20 mL/hr and adv 10 mL q8h to goal 40 mL/hr (960 mL) to provide 1676 kcal (37 kcal/kg), 73 g PRO (1.6 g/kg), 189 g CHO, 702 mL free water, 10 g fiber daily.    - Adhere to 8-hour hang-time (multiply current TF rate by 8 and hang that many mL of TF formula in bag q8h (i.e. if TF @ goal 40 mL/hr hang 320 mL of TF formula in bad q8h))  Future/Additional Recommendations:  - May need to add Nutrisource in future if loose stool does not improve     REASON FOR ASSESSMENT  Minerva Blanco is a/an 72 year old female assessed by the dietitian for Provider Order - Registered Dietitian to Assess and Order TF per Medical Nutrition Therapy Protocol- resuming TF as pt was just readmitted.     NUTRITION HISTORY  Pt was discharged 4/27 and readmitted on 5/3. Pt is reliant on TF.     CURRENT NUTRITION ORDERS  Diet: Clear Liquid  Intake/Tolerance: At home pt was cycling TF. She went home and started at 60ml/hour for 16 hours and then went up to 65ml/hr the next night. She never got to her goal rate, however she feels she may have gone up too fast as she has been experiencing a lot of diarrhea.   Home regimen: 2 cans TwoCal + 2 cans Isosource 1.5 (pt's preference and per home regimen). Begin @ 20 mL/hr and adv 10 mL q8h to goal 40 mL/hr (960 mL) to provide 1676 kcal (41 kcal/kg), 73 g PRO (1.8 g/kg), 189 g CHO, 702 mL free water, 10 g fiber daily.    LABS  Labs reviewed    MEDICATIONS  Medications reviewed    ANTHROPOMETRICS  Height: 152.4 cm (5' 0\")  Most Recent Weight: 44.5 kg (98 lb 3.2 oz)    IBW: 45.5 kg  BMI: Normal BMI  Weight History: Pt has had a 2 lb " wt loss since her discharge from last admission 5 days ago which is about a 2% wt loss in less then one week. Wt gain up overall from last admit wt.  Wt Readings from Last 10 Encounters:   05/03/17 44.5 kg (98 lb 3.2 oz)   05/03/17 44.2 kg (97 lb 8 oz)   04/28/17 45.8 kg (100 lb 14.4 oz)   04/12/17 40.7 kg (89 lb 12.8 oz)   04/12/17 40.7 kg (89 lb 12.8 oz)   02/22/17 44.5 kg (98 lb)   02/22/17 44.7 kg (98 lb 9.6 oz)   02/01/17 47.7 kg (105 lb 1.6 oz)   01/24/17 48.1 kg (106 lb 1.6 oz)   01/04/17 45.9 kg (101 lb 4.8 oz)     Dosing Weight: 45 kg    AASSESSED NUTRITION NEEDS  Estimated Energy Needs: 6544-8369+ kcals/day (35 - 40+ kcals/kg )  Justification: Post-op, Repletion  Estimated Protein Needs: 68-90+ grams protein/day (1.5 - 2+ grams of pro/kg)  Justification: Post-op and Repletion  Estimated Fluid Needs: (1 mL/kcal)   Justification: Per provider pending fluid status    PHYSICAL FINDINGS  See malnutrition section below.  Poor skin turgor     MALNUTRITION  % Intake: Unable to assess- pt has not reached goal TF while at home  % Weight Loss: > 2% in 1 week (severe)  Subcutaneous Fat Loss: Facial region, Upper arm, Lower arm and Thoracic/intercostal: Moderate  Muscle Loss: Temporal, Facial & jaw region, Thoracic region (clavicle, acromium bone, deltoid, trapezius, pectoral), Upper arm (bicep, tricep), Lower arm (forearm), Patellar region and Posterior calf: Moderate  Fluid Accumulation/Edema: None noted  Malnutrition Diagnosis: Severe malnutrition in the context of acute illness    NUTRITION DIAGNOSIS  Inadequate protein-energy intake related to increased needs post-op/repletion, inadequate EN infustion at home as evidenced by 2% wt loss in last 5 days, while at home and pt report.       INTERVENTIONS  Implementation  Enteral Nutrition - Initiate. Pt request to do continuous TF to begin with.     Goals  Total avg nutritional intake to meet a minimum of 30 kcal/kg and 1.5 g PRO/kg daily (per dosing wt 41 kg).      Monitoring/Evaluation  Progress toward goals will be monitored and evaluated per protocol.    Shae Alicia RD, MS, LD

## 2017-05-03 NOTE — LETTER
Transition Communication Hand-off for Care Transitions to Next Level of Care Provider    Name: Minerva Blanco  MRN #: [id number]  Primary Care Provider: Andrea Nino     Primary Clinic: [place] [address]     Reason for Hospitalization:  Pneumonia  Pneumonia  Pneumonia  Pneumonia  Admit Date/Time: 5/3/2017 11:37 AM  Discharge Date: 5/9/2017  Payor Source: Payor: BCBS / Plan: BCBS PLATINUM BLUE / Product Type: PPO /            Reason for Communication Hand-off Referral: Other continuity of care    Discharge Plan: Discharged to home with resumption of home care and home infusion services and plan for clinic f/u     Follow-up plan:  Future Appointments  Date Time Provider Department Center   5/10/2017 12:10 PM Mike Enamorado, DO [facility]       Any outstanding tests or procedures:        Radiology & Cardiology Orders     Future Labs/Procedures Complete By Expires    XR Chest 2 Views  5/24/2017 8/7/2017        Referrals     Future Labs/Procedures    Home care nursing referral     Comments:    Resumption of Home Care Services  _______________________  [facility]  Phone  [phone]  Fax  [phone]  ______________________    Resumption of Home Care Services  RN skilled nursing visit. RN to assess vital signs and weight, respiratory and cardiac status, pain level and activity tolerance, incision for signs/symptoms of infection, hydration, nutrition and bowel status and home safety.  RN to teach medication management.  RN to provide tube site care and management.  RN to provide INR checks. Report INR levels to the [facility] at Phone: [phone] Fax: [phone]    PT/OT to eval and treat    Your provider has ordered home care nursing services. If you have not been contacted within 2 days of your discharge please call the inpatient department phone number at [phone] .    Home infusion referral     Comments:     Resumption of Home Care Services    Your provider has referred you to: [facility]: [facility] [place] [phone]   [email or web address]    Local Address (if different from home address): N/A    Anticipated Length of Therapy: Per MD Order  Enteral Nutrition via J tube  Peptamen 1.5 @ 45ml/hr continuous  500ml-600ml water daily      Home Infusion Pharmacist to adjust therapy based on labs and clinical assessments: Yes    Labs:  May draw labs from Venous Catheter: Yes  Home Infusion Pharmacist to order labs based on therapy type and clinical assessments: Yes    Agency Staff to assess nursing needs for Infusion Therapy.            Sarina Orellana, ROMAINECC  [phone]    AVS/Discharge Summary is the source of truth; this is a helpful guide for improved communication of patient story

## 2017-05-03 NOTE — MR AVS SNAPSHOT
"              After Visit Summary   5/3/2017    Minerva Blanco    MRN: [id number]           Patient Information     Date Of Birth          1945        Visit Information        Provider Department      5/3/2017 7:00 AM Jens Wise MD [facility]        Today's Diagnoses     H/O esophagectomy    -  1    Pneumonia of right lower lobe due to infectious organism           Follow-ups after your visit        Your next 10 appointments already scheduled     May 04, 2017  2:30 PM CDT   (Arrive by 2:15 PM)   New Patient Visit with Mike Enamorado DO   [facility] ([facility])    [address]  4th Floor  [place] [address] [phone]              Who to contact     If you have questions or need follow up information about today's clinic visit or your schedule please contact [facility] directly at [phone].  Normal or non-critical lab and imaging results will be communicated to you by MyChart, letter or phone within 4 business days after the clinic has received the results. If you do not hear from us within 7 days, please contact the clinic through [employer] or phone. If you have a critical or abnormal lab result, we will notify you by phone as soon as possible.  Submit refill requests through [employer] or call your pharmacy and they will forward the refill request to us. Please allow 3 business days for your refill to be completed.          Additional Information About Your Visit        [employer] Information     [employer] lets you send messages to your doctor, view your test results, renew your prescriptions, schedule appointments and more. To sign up, go to [email or web address] . Click on \"Log in\" on the left side of the screen, which will take you to the Welcome page. Then click on \"Sign up Now\" on the right side of the page.     You will be asked to enter the access code " listed below, as well as some personal information. Please follow the directions to create your username and password.     Your access code is: [email or web address]  Expires: 2017  7:30 AM     Your access code will  in 90 days. If you need help or a new code, please call your [place] clinic or [phone].        Care EveryWhere ID     This is your Care EveryWhere ID. This could be used by other organizations to access your [place] medical records  [id number]        Your Vitals Were     Pulse Temperature Respirations Height Pulse Oximetry BMI (Body Mass Index)    93 98.8  F (37.1  C) (Oral) 18 1.524 m (5') 94% 19.04 kg/m2       Blood Pressure from Last 3 Encounters:   17 93/50   17 100/60   17 137/67    Weight from Last 3 Encounters:   17 44.5 kg (98 lb 3.2 oz)   17 44.2 kg (97 lb 8 oz)   17 45.8 kg (100 lb 14.4 oz)              Today, you had the following     No orders found for display         Today's Medication Changes          These changes are accurate as of: 5/3/17 11:37 AM.  If you have any questions, ask your nurse or doctor.               These medicines have changed or have updated prescriptions.        Dose/Directions    loperamide 1 mg/5 mL liquid   Commonly known as:  IMODIUM   This may have changed:  when to take this   Used for:  Bowel habit changes        Dose:  4 mg   Take 20 mLs (4 mg) by mouth 4 times daily as needed for diarrhea   Quantity:  200 mL   Refills:  0       metoprolol 50 MG tablet   Commonly known as:  LOPRESSOR   This may have changed:    - how much to take  - how to take this  - when to take this  - additional instructions   Used for:  Essential hypertension        50 mg BID.  May crush, mix with water and administer via feeding tube   Quantity:  60 tablet   Refills:  3                Primary Care Provider Office Phone # Fax #    Andrea Nino [phone]       [facility]  [place] [address]         Thank you!     Thank you for choosing [facility]  for your care. Our goal is always to provide you with excellent care. Hearing back from our patients is one way we can continue to improve our services. Please take a few minutes to complete the written survey that you may receive in the mail after your visit with us. Thank you!             Your Updated Medication List - Protect others around you: Learn how to safely use, store and throw away your medicines at www.disposemymeds.org.          This list is accurate as of: 5/3/17 11:37 AM.  Always use your most recent med list.                   Brand Name Dispense Instructions for use    acetaminophen 32 mg/mL solution    TYLENOL    1370.25 mL    Take 30.45 mLs (975 mg) by mouth every 8 hours for 15 days       BENADRYL PO      Take 25 mg by mouth nightly as needed       cholestyramine 4 G Packet    QUESTRAN     Take 1 packet by mouth daily       CULTURELLE PO      Take 1 tablet by mouth 2 times daily       enoxaparin 60 MG/0.6ML injection    LOVENOX    6.72 mL    Inject 0.48 mLs (48 mg) Subcutaneous every 12 hours for 7 days       fiber modular packet     60 packet    1 packet by Per Feeding Tube route 2 times daily       gabapentin 250 MG/5ML solution    NEURONTIN    450 mL    Take 6 mLs (300 mg) by mouth every 8 hours       hyoscyamine 0.125 MG tablet    ANASPAZ/LEVSIN    90 tablet    Take 1 tablet (125 mcg) by mouth 3 times daily       loperamide 1 mg/5 mL liquid    IMODIUM    200 mL    Take 20 mLs (4 mg) by mouth 4 times daily as needed for diarrhea       metoprolol 50 MG tablet    LOPRESSOR    60 tablet    50 mg BID.  May crush, mix with water and administer via feeding tube       multivitamins with minerals Liqd liquid      Take 15 mLs by mouth daily       opium tincture 10 mg/mL (1%) liquid     72 mL    Take 0.6 mLs (6 mg) by mouth every 6 hours       * order for DME     1 Device    Equipment being ordered:  Goo Suction  Machine-Intermittent Suction Canisters(2) Suction Canister Holders(2) Suction Connect Tube(2) 5 in 1 Connector(2) Bacteria Filter(2) Yaunkauer Suction(2)  Treatment Diagnosis: Esophogeal Perforation, s/p esophagectomy       * order for DME     1 Device    Equipment being ordered:  Suction Pump Suction Canister(2) Suction Tubing(2) Bacteria Filter(2) Yaunkauer(4) Red Rubber Catheter(2)  Treatment Diagnosis: Esophogeal Perforation, s/p esophagectomy       oxyCODONE 5 MG IR tablet    ROXICODONE    10 tablet    Take 1-2 tablets (5-10 mg) by mouth every 4 hours as needed for moderate to severe pain Take 1-2 tablets by mouth every 4 hours as needed for pain.       ranitidine 150 MG/10ML syrup    Zantac    600 mL    Take 10 mLs (150 mg) by mouth 2 times daily       simethicone 40 MG/0.6ML suspension    MYLICON    30 mL    0.6 mLs (40 mg) by Per Feeding Tube route every 6 hours as needed for cramping       traZODone 100 MG tablet    DESYREL    30 tablet    0.5 tablets (50 mg) by Per G Tube route At Bedtime       warfarin 2.5 MG tablet    COUMADIN    30 tablet    Take 1 tablet (2.5 mg) by mouth daily       * Notice:  This list has 2 medication(s) that are the same as other medications prescribed for you. Read the directions carefully, and ask your doctor or other care provider to review them with you.

## 2017-05-03 NOTE — H&P
THORACIC SURGERY HISTORY & PHYSICAL    Patient: Minerva Blanco   MRN: [id number]     Date of Admission: 5/3/2017  Attending Physician: Dr. Wise    CC: Pneumonia    HPI: Minerva Blanco is a 72 year old female well-known to the Thoracic Surgery service with history of chronic esophageal perforation s/p fundoplication s/p esophagectomy with spit fistula. She was recently discharged on 4/28 from our service after admission for the below operations:  1. Laparoscopic lysis of adhesions.   2. Laparoscopic jejunostomy feeding tube placement.   3. Laparoscopic retrosternal gastric pull-through.   4. Resection of the left half of the manubrium.   5. Spit fistula takedown.   6. Esophagogastroscopy.   7. Flexible bronchoscopy.   8. Right tube thoracostomy.   9. Left pharyngostomy tube placement.     Pharyngostomy tube was subsequently removed prior to discharge.     She presented to Dr. Wise's clinic today with fevers to 102.9 and dyspnea on exertion. CT imaging showed multifocal pneumonia and she was admitted for treatment.     She notes SOB on exertion, continued reflux, and episodes of fevers. Denies chills, nausea/emesis, dysuria. She has had multiple loose bowel movements daily, unchanged from her previous admission.      PMH:  Past Medical History:   Diagnosis Date     Atrial fibrillation (H)      Breast cancer (H) 2007    right side - lumpectomy, chemo, and local radiation     Esophageal perforation      Fibromyalgia      GERD (gastroesophageal reflux disease)      Hiatal hernia      History of blood transfusion      IBS (irritable bowel syndrome)      Meniere's disease     deaf left ear     MS (multiple sclerosis) (H)        PSH:  Past Surgical History:   Procedure Laterality Date     APPENDECTOMY       BACK SURGERY  5/1/2015    lumbar fusion     BRONCHOSCOPY FLEXIBLE N/A 4/17/2017    Procedure: BRONCHOSCOPY FLEXIBLE;;  Surgeon: Jens Wise MD;  Location: [facility]     C GASTROSTOMY/JEJUN TUBE       J tube for feedings     CLOSE SPIT FISTULA N/A 4/17/2017    Procedure: CLOSE SPIT FISTULA;;  Surgeon: Jens Wise MD;  [facility]     COMBINED ESOPHAGOSCOPY, GASTROSCOPY, DUODENOSCOPY (EGD), REMOVE ESOPHAGEAL STENT N/A 8/26/2016    Procedure: COMBINED ESOPHAGOSCOPY, GASTROSCOPY, DUODENOSCOPY (EGD), REMOVE ESOPHAGEAL STENT;  Surgeon: Jens Wise MD;  [facility]     CREATE SPIT FISTULA N/A 1/9/2017    Procedure: CREATE SPIT FISTULA;  Surgeon: Jens Wise MD;  [facility]     ESOPHAGECTOMY N/A 1/9/2017    Procedure: ESOPHAGECTOMY;  Surgeon: Jens Wise MD;  [facility]     ESOPHAGOSCOPY, GASTROSCOPY, DUODENOSCOPY (EGD), COMBINED N/A 4/18/2016    Procedure: COMBINED ESOPHAGOSCOPY, GASTROSCOPY, DUODENOSCOPY (EGD);  Surgeon: Alexis Barraza MD;  [facility]     ESOPHAGOSCOPY, GASTROSCOPY, DUODENOSCOPY (EGD), COMBINED N/A 4/25/2016    Procedure: COMBINED ESOPHAGOSCOPY, GASTROSCOPY, DUODENOSCOPY (EGD);  Surgeon: Alexis Barraza MD;  [facility]     ESOPHAGOSCOPY, GASTROSCOPY, DUODENOSCOPY (EGD), COMBINED N/A 5/4/2016    Procedure: COMBINED ESOPHAGOSCOPY, GASTROSCOPY, DUODENOSCOPY (EGD);  Surgeon: Alexis Barraza MD;  [facility]     ESOPHAGOSCOPY, GASTROSCOPY, DUODENOSCOPY (EGD), COMBINED N/A 5/18/2016    Procedure: COMBINED ESOPHAGOSCOPY, GASTROSCOPY, DUODENOSCOPY (EGD);  Surgeon: Alexis Barraza MD;  [facility]     ESOPHAGOSCOPY, GASTROSCOPY, DUODENOSCOPY (EGD), COMBINED N/A 6/22/2016    Procedure: COMBINED ESOPHAGOSCOPY, GASTROSCOPY, DUODENOSCOPY (EGD);  Surgeon: Alexis Barraza MD;  [facility]     ESOPHAGOSCOPY, GASTROSCOPY, DUODENOSCOPY (EGD), COMBINED N/A 7/12/2016    Procedure: COMBINED ESOPHAGOSCOPY, GASTROSCOPY, DUODENOSCOPY (EGD);  Surgeon: Jens Wise MD;  [facility]     ESOPHAGOSCOPY, GASTROSCOPY, DUODENOSCOPY (EGD), COMBINED N/A 4/21/2017     Procedure: COMBINED ESOPHAGOSCOPY, GASTROSCOPY, DUODENOSCOPY (EGD);  Esophagogastroduodenoscopy, Pharyngostomy Tube Placement ;  Surgeon: Jens Wise MD;  [facility]     ESOPHAGOSCOPY, GASTROSCOPY, DUODENOSCOPY (EGD), DILATATION, COMBINED N/A 6/29/2016    Procedure: COMBINED ESOPHAGOSCOPY, GASTROSCOPY, DUODENOSCOPY (EGD), DILATATION;  Surgeon: Jens Wise MD;  [facility]     HC UGI ENDOSCOPY W TRANSENDOSCOPIC STENT PLACEMENT N/A 7/12/2016    Procedure: COMBINED ESOPHAGOSCOPY, GASTROSCOPY, DUODENOSCOPY (EGD), PLACE TRANSENDOSCOPIC ESOPHAGEAL STENT;  Surgeon: Jens Wise MD;  [facility]     HC UGI ENDOSCOPY W TRANSENDOSCOPIC STENT PLACEMENT N/A 7/22/2016    Procedure: COMBINED ESOPHAGOSCOPY, GASTROSCOPY, DUODENOSCOPY (EGD), PLACE TRANSENDOSCOPIC ESOPHAGEAL STENT;  Surgeon: Jens Wise MD;  [facility]     IRRIGATION AND DEBRIDEMENT CHEST WASHOUT, COMBINED N/A 6/29/2016    Procedure: COMBINED IRRIGATION AND DEBRIDEMENT CHEST WASHOUT;  Surgeon: Jens Wise MD;  [facility]     LAPAROSCOPIC ASSISTED INSERTION TUBE JEJUNOSTOMY N/A 4/17/2017    Procedure: LAPAROSCOPIC ASSISTED INSERTION TUBE JEJUNOSTOMY;;  Surgeon: Jens Wise MD;  [facility]     LAPAROSCOPY DIAGNOSTIC (GENERAL) N/A 1/9/2017    Procedure: LAPAROSCOPY DIAGNOSTIC (GENERAL);  Surgeon: Jens Wise MD;  [facility]     LAPAROSCOPY DIAGNOSTIC (GENERAL) N/A 4/17/2017    Procedure: LAPAROSCOPY DIAGNOSTIC (GENERAL);  Laparoscopic , Neck Dissection, Spit Fistula Takedown, Laparoscopic Jejunostomy Tube and Pharyngostomy Tube, Gastric Pull up, Upper Endoscopy(EGD)  , Flexible Bronchoscopy ,Sternotomy ;  Surgeon: Jens Wise MD;  [facility]     LUMPECTOMY BREAST Right 2007     NERVE BLOCK PERIPHERAL N/A 8/30/2016    Procedure: NERVE BLOCK PERIPHERAL;  Surgeon: GENERIC ANESTHESIA PROVIDER;  Location: [facility]     NISSEN  "FUNDOPLICATION  1/2016     PHARYNGOSTOMY N/A 4/18/2016    Procedure: PHARYNGOSTOMY;  Surgeon: Alexis Barraza MD;  [facility]     PHARYNGOSTOMY N/A 4/25/2016    Procedure: PHARYNGOSTOMY;  Surgeon: Alexis Barraza MD;  [facility]     PHARYNGOSTOMY N/A 5/4/2016    Procedure: PHARYNGOSTOMY;  Surgeon: Alexis Barraza MD;  [facility]     PHARYNGOSTOMY N/A 6/22/2016    Procedure: PHARYNGOSTOMY;  Surgeon: Alexis Barraza MD;  [facility]     PHARYNGOSTOMY N/A 6/29/2016    Procedure: PHARYNGOSTOMY;  Surgeon: Jens Wise MD;  [facility]     PHARYNGOSTOMY N/A 4/21/2017    Procedure: PHARYNGOSTOMY;;  Surgeon: Jens Wise MD;  [facility]     PICC INSERTION Left 8/25/2016    5fr DL BioFlo PICC, 42cm (3cm external) in the L medial brachial vein w/ tip in the SVC RA junction.     THORACOTOMY Right 1998    lung infection - \"hard crust formed on lung\"     THORACOTOMY Left 1/9/2017    Procedure: THORACOTOMY;  Surgeon: Jens Wise MD;  [facility]     WRIST SURGERY         FH:  family history includes Alzheimer Disease in her mother; Breast Cancer in her daughter; CEREBROVASCULAR DISEASE in her father; Ovarian Cancer in her sister.    SH:   reports that she quit smoking about 19 years ago. Her smoking use included Cigarettes. She has a 15.00 pack-year smoking history. She does not have any smokeless tobacco history on file. She reports that she does not drink alcohol or use illicit drugs.    Allergies:  Allergies   Allergen Reactions     Amoxicillin Diarrhea     Ativan [Lorazepam] Other (See Comments)     Hallucinations     Hydromorphone Itching     4/12/17 - patient open to using this as she tolerated Hydromorphone PCA during hospitalization in January 2017.      Morphine Itching       Home Meds:  Prescriptions Prior to Admission   Medication Sig Dispense Refill Last Dose     oxyCODONE (ROXICODONE) 5 MG IR tablet " Take 1-2 tablets (5-10 mg) by mouth every 4 hours as needed for moderate to severe pain Take 1-2 tablets by mouth every 4 hours as needed for pain. 10 tablet 0 5/3/2017 at 0700     acetaminophen (TYLENOL) 32 mg/mL solution Take 30.45 mLs (975 mg) by mouth every 8 hours for 15 days 1370.25 mL 0 5/3/2017 at 0700     enoxaparin (LOVENOX) 60 MG/0.6ML injection Inject 0.48 mLs (48 mg) Subcutaneous every 12 hours for 7 days 6.72 mL 0 5/3/2017 at 0700     gabapentin (NEURONTIN) 250 MG/5ML solution Take 6 mLs (300 mg) by mouth every 8 hours 450 mL 0 5/2/2017 at Unknown time     hyoscyamine (ANASPAZ/LEVSIN) 0.125 MG tablet Take 1 tablet (125 mcg) by mouth 3 times daily 90 tablet 0 5/2/2017 at Unknown time     opium tincture 10 mg/mL (1%) liquid Take 0.6 mLs (6 mg) by mouth every 6 hours 72 mL 0 5/2/2017 at Unknown time     traZODone (DESYREL) 100 MG tablet 0.5 tablets (50 mg) by Per G Tube route At Bedtime (Patient taking differently: 100 mg by Per G Tube route At Bedtime ) 30 tablet 0 5/2/2017 at Unknown time     simethicone (MYLICON) 40 MG/0.6ML suspension 0.6 mLs (40 mg) by Per Feeding Tube route every 6 hours as needed for cramping 30 mL 0 5/2/2017 at Unknown time     ranitidine (ZANTAC) 150 MG/10ML syrup Take 10 mLs (150 mg) by mouth 2 times daily 600 mL 0 5/2/2017 at Unknown time     warfarin (COUMADIN) 2.5 MG tablet Take 1 tablet (2.5 mg) by mouth daily 30 tablet 1 5/2/2017 at Unknown time     DiphenhydrAMINE HCl (BENADRYL PO) Take 25 mg by mouth nightly as needed   5/2/2017 at Unknown time     multivitamins with minerals (CERTAVITE/CEROVITE) LIQD liquid Take 15 mLs by mouth daily   5/2/2017 at Unknown time     metoprolol (LOPRESSOR) 50 MG tablet 50 mg BID.  May crush, mix with water and administer via feeding tube (Patient taking differently: 25 mg by Per G Tube route 2 times daily May crush, mix with water and administer via feeding tube) 60 tablet 3 5/3/2017 at Unknown time     loperamide (IMODIUM) 1 MG/5ML liquid  Take 20 mLs (4 mg) by mouth 4 times daily as needed for diarrhea (Patient taking differently: Take 4 mg by mouth 2 times daily ) 200 mL  5/2/2017 at Unknown time     Lactobacillus Rhamnosus, GG, (CULTURELLE PO) Take 1 tablet by mouth 2 times daily    5/2/2017 at Unknown time     fiber modular (NUTRISOURCE FIBER) packet 1 packet by Per Feeding Tube route 2 times daily 60 packet 0 Unknown at Unknown time     order for DME Equipment being ordered:   [facility] Suction Machine-Intermittent  Suction Canisters(2)  Suction Canister Holders(2)  Suction Connect Tube(2)  5 in 1 Connector(2)  Bacteria Filter(2)  Yaunkauer Suction(2)    Treatment Diagnosis: Esophogeal Perforation, s/p esophagectomy 1 Device 0 Taking     order for DME Equipment being ordered:   Suction Pump  Suction Canister(2)  Suction Tubing(2)  Bacteria Filter(2)  Yaunkauer(4)  Red Rubber Catheter(2)    Treatment Diagnosis: Esophogeal Perforation, s/p esophagectomy 1 Device 0 Taking     cholestyramine (QUESTRAN) 4 G Packet Take 1 packet by mouth daily   Unknown at Unknown time       ROS:   A 10-point review of systems was performed and otherwise negative except as reported in HPI.    Physical Exam:  Temp:  [97  F (36.1  C)-98.8  F (37.1  C)] 97  F (36.1  C)  Pulse:  [90-93] 90  Resp:  [18] 18  BP: ()/(50-60) 93/50  SpO2:  [94 %-99 %] 99 %    General: AAOx4, NAD, lying comfortably in bed  CV: regular rate, warm, well-perfused  Pulm: no dyspnea, breathing comfortably on RA. Crackles on left lung base.  Abd: soft, non-distended, mildly tender. J-tube in place, no site erythema/swelling  Extremities: no edema  Skin: no rashes  Neuro: moving all extremities spontaneously without apparent deficit    Labs:  ABG No lab results found in last 7 days.  CBC   Recent Labs  Lab 04/27/17  1719 04/27/17  0832   WBC  --  11.3*   HGB 9.9* 6.8*   PLT  --  455*     BMP   Recent Labs  Lab 04/27/17  0832      POTASSIUM 3.8   CHLORIDE 104   CO2 26   BUN 11   CR 0.41*   GLC  96     LFTs   Recent Labs  Lab 05/01/17   INR 1.4     Pancreas No lab results found in last 7 days.    Imaging:  No orders to display       ASSESSMENT/PLAN:  Minerva Blanco is a 72 year old female who presents with pneumonia.    Admit to Thoracic Surgery  Neuro: Pain control: continue home regimen as recommended by Pain Service  CV: HDS. PICC for access.  Pulm: Sputum cultures. Nebs PRN.   FEN/GI: FLD. SLP consult.  : no issues  ID: IV Abx: Zosyn/Levaquin  Endo: no issues  Heme: warfarin. INR therapeutic today, stopping Lovenox.  PPx: on warfarin.  Dispo: 7B      Discussed with fellow Dr. Whitlock, who will discuss with staff, Dr. Wise.    Blas Dominguez MD  PGY-1 General Surgery  [facility]    STAFF ADDENDUM:  I saw and evaluated Ms. Blanco and agree with the resident s findings and plan of care as documented in the resident s note and edited by me, as applicable.      In summary, Ms. Blanco has aspiration pneumonia and is struggling with management of her anxiety and pain. She will be readmitted today for IV antibiotics and reassessment of her pain management and anxiety.  I spent a total of 20 minutes with Ms. Blanco, 15 of which were spent in counseling and coordination of care. The patient had all questions answered and was in agreement with the plan.  Jens Wise MD

## 2017-05-03 NOTE — PHARMACY-CONSULT NOTE
Pharmacy Tube Feeding Consult    Medication reviewed for administration by feeding tube and for potential food/drug interactions.    Recommendation: No changes are needed at this time.     Pharmacy will continue to follow as new medications are ordered.    Mandy CuadraD

## 2017-05-03 NOTE — LETTER
5/3/2017      RE: Minerva Blanco  [id number]  [place] [id number]       THORACIC SURGERY FOLLOW UP VISIT    Dear Dr. Carroll,  I saw Mrs. Minerva Blanco in follow-up today. The clinical summary follows:     PREOP DIAGNOSIS   1.  Chronic esophageal perforation with mediastinal phlegmon.    2.  Status post fundoplication.    3.  S/P esophagectomy with esophageal discontinuity     PROCEDURES   1. Toupet fundoplication (1/2016)  2. Multiple endoscopies and pharyngostomy tube placement x 2 (for drainage of paraesophageal cavity)  3. Esophageal stent placement, attempted placement of biliary stent into the paraesophageal cavity (7/22/2016)  4. Esophageal stent removal, placement of retrograde gastroesophageal tube (10/23/2016)  1/9/2017  1.  Laparoscopic proximal gastrectomy and gastrostomy tube placement.    2.  Left thoracotomy with excision of mediastinal phlegmon and distal esophagectomy.    3.  Thoracic duct ligation.      4.  Left esophageal spit fistula.         4/17/2017  1) Laparoscopic retrosternal gastric pull-up, j-tube  2) Partial manubriectomy    INTERVAL STUDIES  CT PE (today): RIGHT pneumonia, small air pocket without major inflammatory response adjacent to esophagogastric anastomosis.    ETOH negative  TOB negative  BMI 19    SUBJECTIVE  Mrs. Blanco is struggling right now. She was doing quite well just prior to discharge, and her pain was well controlled. However, a day after discharge her J-tube was clogged and they had to come to the ER, although it was corrected, it appears to have set her off course. She is currently complaining of fever up to 102.9, IE edema, back pain, fatigue, GERD, and dyspnea. Also, she has a mild amount of drainage from the bottom of her chest incision, but I do not see any evidence of a deep wound infection.    From a personal perspective, she is here with her , Enrique.    IMPRESSION   (Z90.49) H/O esophagectomy  (primary encounter  diagnosis)(Z90.49) H/O esophagectomy  (primary encounter diagnosis)  (J18.9) Pneumonia of right lower lobe due to infectious organism    72 year-old female 2 weeks S/P retrosternal gastric pull-up for end-stage GERD  Pneumonia (RIGHT lower lobe)    PLAN  I reviewed the plan as follows:  Admit to hospital for pneumonia    All questions were answered and the patient and present family were in agreement with the plan.  I appreciate the opportunity to participate in the care of your patient and will keep you updated.  Sincerely,      Jens Wise MD

## 2017-05-03 NOTE — PLAN OF CARE
Problem: Goal Outcome Summary  Goal: Goal Outcome Summary  Outcome: No Change  Here for pneumonia. VSS. Sats in high 90's on room air. Lungs diminished at bases. Non-productive cough. Needs sputum sample- sample cup at bedside and pt understands need for sample. Up ad-renea in room. Clear liquid diet and will start on tube feedings per J-tube. Taking PRN oxycodone 10mg for pain. Using call light appropriately. Continue with POC.

## 2017-05-03 NOTE — IP AVS SNAPSHOT
[address] [facility]: [phone]                                              INTERAGENCY TRANSFER FORM - PHYSICIAN ORDERS   5/3/2017                    Hospital Admission Date: 5/3/2017  FAVIAN MALONE   : 1945  Sex: Female        Attending Provider: Jens Wise MD     Allergies:  Amoxicillin, Ativan [Lorazepam], Hydromorphone, Morphine    Infection:  None   Service:  THORACIC QUEENIE    Ht:  1.524 m (5')   Wt:  47.9 kg (105 lb 11.2 oz)   Admission Wt:  44.5 kg (98 lb 3.2 oz)    BMI:  20.64 kg/m 2   BSA:  1.42 m 2            Patient PCP Information     Provider PCP Type    Andrea Nino MD General      ED Clinical Impression     Diagnosis Description Comment Added By Time Added    Esophageal perforation [K22.3] Esophageal perforation [K22.3]  Sarina Orellana, RN 2017 10:41 AM    Long-term (current) use of anticoagulants [Z79.01] Long-term (current) use of anticoagulants [Z79.01]  Sarina Orellana RN 2017 10:41 AM    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]  Sarina Orellana RN 2017 10:41 AM    Bowel habit changes [R19.4] Bowel habit changes [R19.4]  Saul Rogers PA-C 2017 10:49 AM    History of esophagectomy [Z90.49] History of esophagectomy [Z90.49]  Saul Rogers PA-C 2017 10:51 AM    Atrial fibrillation, unspecified type (H) [I48.91] Atrial fibrillation, unspecified type (H) [I48.91]  Saul Rogers PA-C 2017 10:57 AM    Anemia due to other cause [D64.89] Anemia due to other cause [D64.89]  Saul Rogers PA-C 2017 11:09 AM    HCAP (healthcare-associated pneumonia) [J18.9] HCAP (healthcare-associated pneumonia) [J18.9]  Saul Rogers PA-C 2017 11:09 AM    Acute post-operative pain [G89.18] Acute post-operative pain [G89.18]  Saul Rogers PA-C 2017 11:29 AM      Hospital Problems as of 2017              Priority Class Noted POA    Pneumonia Medium  5/3/2017 Yes       Non-Hospital Problems as of 5/9/2017              Priority Class Noted    Abscess Medium  4/16/2016    Esophageal perforation Medium  4/18/2016    Esophageal stricture Medium  8/25/2016    Mediastinitis Medium  8/28/2016    Gastroesophageal reflux disease, esophagitis presence not specified Medium  10/21/2016    Paroxysmal atrial fibrillation (H) Medium  2/21/2017    Long-term (current) use of anticoagulants [Z79.01] Medium  2/22/2017    Atrial fibrillation (H) [I48.91] Medium  2/22/2017    Hx of esophagectomy Medium  4/17/2017    History of esophagectomy Medium  4/17/2017    Advance Care Planning   5/3/2017      Code Status History     Date Active Date Inactive Code Status Order ID Comments User Context    4/17/2017  7:41 PM 4/28/2017  3:58 PM Full Code [id number]  Arslan Wisdom MD Inpatient    9/5/2016  5:48 AM 4/17/2017  7:41 PM Full Code [id number]  Janae Montes MD Outpatient    8/27/2016 10:35 AM 9/5/2016  5:48 AM Full Code [id number]  Jalen Mcadams MD Outpatient    8/25/2016  5:19 PM 8/27/2016 10:35 AM Full Code [id number]  Jalen Mcadams MD Inpatient    7/12/2016  3:51 PM 7/13/2016  4:37 PM Full Code [id number]  Justice Hill MD Inpatient    5/18/2016 10:47 AM 7/12/2016  3:51 PM Full Code [id number]  Anant Melendrez MD Outpatient    4/27/2016  8:07 AM 5/18/2016 10:47 AM Full Code [id number]  Blas Rubio MD Outpatient    4/18/2016  6:38 PM 4/27/2016  8:07 AM Full Code [id number]  Blas Rubio MD Inpatient    4/16/2016  1:19 AM 4/18/2016  6:38 PM Full Code [id number]  Faisal Dolan MD Inpatient         Medication Review      START taking        Dose / Directions Comments    diphenoxylate-atropine 0.5-0.005 mg/mL liquid   Commonly known as:  LOMOTIL   Used for:  Bowel habit changes        Dose:  5 mL   Take 5 mLs by mouth 4 times daily as needed for diarrhea   Quantity:  300 mL   Refills:  0        ferrous sulfate 300 (60 FE) MG/5ML syrup   Used for:  Anemia due to other cause         Dose:  300 mg   5 mLs (300 mg) by Per J Tube route daily   Quantity:  150 mL   Refills:  0        levofloxacin 500 MG tablet   Commonly known as:  LEVAQUIN   Used for:  HCAP (healthcare-associated pneumonia)        Dose:  500 mg   Take 1 tablet (500 mg) by mouth daily for 7 days   Quantity:  7 tablet   Refills:  0        metoprolol 10 mg/mL Susp   Commonly known as:  LOPRESSOR   Used for:  Atrial fibrillation, unspecified type (H)   Replaces:  metoprolol 50 MG tablet        Dose:  25 mg   2.5 mLs (25 mg) by Per J Tube route 2 times daily   Refills:  0          CONTINUE these medications which may have CHANGED, or have new prescriptions. If we are uncertain of the size of tablets/capsules you have at home, strength may be listed as something that might have changed.        Dose / Directions Comments    fiber modular packet   This may have changed:  when to take this   Used for:  History of esophagectomy        Dose:  1 packet   1 packet by Per Feeding Tube route 3 times daily (with meals)   Quantity:  60 packet   Refills:  0        loperamide 1 mg/5 mL liquid   Commonly known as:  IMODIUM   This may have changed:  when to take this   Used for:  Bowel habit changes        Dose:  4 mg   Take 20 mLs (4 mg) by mouth 4 times daily as needed for diarrhea   Quantity:  200 mL   Refills:  0        oxyCODONE 5 MG/5ML solution   Commonly known as:  ROXICODONE   This may have changed:    - how much to take  - how to take this  - when to take this  - Another medication with the same name was removed. Continue taking this medication, and follow the directions you see here.   Used for:  Acute post-operative pain        Dose:  10-15 mg   10-15 mLs (10-15 mg) by Oral or Feeding Tube route every 3 hours as needed for moderate to severe pain   Quantity:  700 mL   Refills:  0        traZODone 100 MG tablet   Commonly known as:  DESYREL   This may have changed:  how much to take   Used for:  Insomnia, unspecified type        Dose:  50 mg    0.5 tablets (50 mg) by Per G Tube route At Bedtime   Quantity:  30 tablet   Refills:  0          CONTINUE these medications which have NOT CHANGED        Dose / Directions Comments    acetaminophen 32 mg/mL solution   Commonly known as:  TYLENOL   Used for:  Acute post-operative pain        Dose:  975 mg   Take 30.45 mLs (975 mg) by mouth every 8 hours for 15 days   Quantity:  1370.25 mL   Refills:  0        BENADRYL PO        Dose:  25 mg   Take 25 mg by mouth nightly as needed   Refills:  0        cholestyramine 4 G Packet   Commonly known as:  QUESTRAN        Dose:  1 packet   Take 1 packet by mouth daily   Refills:  0        CULTURELLE PO        Dose:  1 tablet   Take 1 tablet by mouth 2 times daily   Refills:  0        gabapentin 250 MG/5ML solution   Commonly known as:  NEURONTIN   Used for:  Acute post-operative pain        Dose:  300 mg   Take 6 mLs (300 mg) by mouth every 8 hours   Quantity:  450 mL   Refills:  0        multivitamins with minerals Liqd liquid        Dose:  15 mL   Take 15 mLs by mouth daily   Refills:  0        opium tincture 10 mg/mL (1%) liquid   Used for:  Bowel habit changes        Dose:  0.6 mL   Take 0.6 mLs (6 mg) by mouth every 6 hours   Quantity:  118 mL   Refills:  0        * order for DME   Used for:  Hx of esophagectomy        [place]ipment being [place]  [facility] [place]-Intermittent Suction Canisters(2) Suction Canister Holders(2) Suction Connect Tube(2) 5 in 1 Connector(2) Bacteria Filter(2) Yaunkauer Suction(2)  Treatment Diagnosis: Esophogeal Perforation, s/p esophagectomy   Quantity:  1 Device   Refills:  0        * order for DME   Used for:  Esophageal perforation        Equipment being ordered:  Suction Pump Suction Canister(2) Suction Tubing(2) Bacteria Filter(2) Yaunkauer(4) Red Rubber Catheter(2)  Treatment Diagnosis: Esophogeal Perforation, s/p esophagectomy   Quantity:  1 Device   Refills:  0        ranitidine 150 MG/10ML syrup   Commonly known as:  Zantac    Used for:  Hx of esophagectomy        Dose:  150 mg   Take 10 mLs (150 mg) by mouth 2 times daily   Quantity:  600 mL   Refills:  0        simethicone 40 MG/0.6ML suspension   Commonly known as:  MYLICON   Used for:  Abdominal cramping        Dose:  40 mg   0.6 mLs (40 mg) by Per Feeding Tube route every 6 hours as needed for cramping   Quantity:  30 mL   Refills:  0        warfarin 2.5 MG tablet   Commonly known as:  COUMADIN   Used for:  Paroxysmal atrial fibrillation (H)        Dose:  2.5 mg   Take 1 tablet (2.5 mg) by mouth daily   Quantity:  30 tablet   Refills:  1        * Notice:  This list has 2 medication(s) that are the same as other medications prescribed for you. Read the directions carefully, and ask your doctor or other care provider to review them with you.      STOP taking     enoxaparin 60 MG/0.6ML injection   Commonly known as:  LOVENOX           hyoscyamine 0.125 MG tablet   Commonly known as:  ANASPAZ/LEVSIN           metoprolol 50 MG tablet   Commonly known as:  LOPRESSOR   Replaced by:  metoprolol 10 mg/mL Susp                   After Care     Discharge Instructions       DIET: Full liquid diet.  Soft mechanical in 5-7 days if doing well.  With the soft diet chew very well and take 4-5 small meals per day, or more if necessary. Do not take food by mouth after 6pm to minimize reflux.    You will continue tube feeds until follow up, so food by mouth is primarily for pleasure.  Transition to each new diet stage and introduce new foods slowly to  how well you tolerate them.    Follow instructions provided by the dietician and the speech therapist    Sleep with the head of your bed elevated to help prevent reflux, which is common after esophagectomy    If your travel plans upon discharge include prolonged periods of sitting (a lengthy car or plane ride), it is highly beneficial to get up and walk at least once per hour to help prevent swelling and blood clots.     You may get incision wet 2  days after operation. Do not submerge, soak, or scrub incision or swim until seen in follow-up.    Take incentive spirometer home for continued frequent use    Activity as tolerated, no strenous activity until seen in follow-up, no lifting greater than 10 pounds for the next 2-4 weeks.    Stay hydrated. Take over the counter fiber (metamucil or benefiber) and stool softeners (Miralax, docusate or senna) if becoming constipated.     Call for fever greater than 101.5, chills, increased size of incision, red skin around incision, vision changes, muscle strength changes, sensation changes, shortness of breath, or other concerns.    No driving while taking narcotic pain medication.    Transition to ibuprofen or tylenol/acetaminophen for pain control. Do not take tylenol/acetaminophen and acetaminophen containing narcotic (e.g., percocet or vicodin) at the same time. If you have known ulcer problems, or kidney trouble (elevated creatinine) do not take the ibuprofen.    In emergencies, call [phone]    For other Questions or Concerns;   A.) During weekday working hours (Monday through Friday 8am to 4:30pm) call [phone] and ask to speak to a clinical nurse specialist.     B.) At nights (after 4:30pm), on weekends, or if urgent call [phone] and tell the   I would like to page job code [phone], the thoracic surgery fellow on call, please.              Referrals     Home care nursing referral       Resumption of Home Care Services  _______________________  [facility]  Phone  [phone]  Fax  [phone]  ______________________    Resumption of Home Care Services  RN skilled nursing visit. RN to assess vital signs and weight, respiratory and cardiac status, pain level and activity tolerance, incision for signs/symptoms of infection, hydration, nutrition and bowel status and home safety.  RN to teach medication management.  RN to provide tube site care and management.  RN to provide INR checks. Report  INR levels to the [facility] at Phone: [phone] Fax: [phone]    PT/OT to eval and treat    Your provider has ordered home care nursing services. If you have not been contacted within 2 days of your discharge please call the inpatient department phone number at [phone] .       Home infusion referral       Resumption of Home Care Services    Your provider has referred you to: [facility]: [facility] [phone]   [email or web address]    Local Address (if different from home address): N/A    Anticipated Length of Therapy: Per MD Order  Enteral Nutrition via J tube  Peptamen 1.5 @ 45ml/hr continuous  500ml-600ml water daily      Home Infusion Pharmacist to adjust therapy based on labs and clinical assessments: Yes    Labs:  May draw labs from Venous Catheter: Yes  Home Infusion Pharmacist to order labs based on therapy type and clinical assessments: Yes    Agency Staff to assess nursing needs for Infusion Therapy.             Lab Orders     Albumin level           Basic metabolic panel           CBC with platelets       Last Lab Result: Hemoglobin (g/dL)       Date                     Value                 05/09/2017               9.8 (L)          ----------       Prealbumin                 Radiology & Cardiology Orders     Future Labs/Procedures Complete By Expires    XR Chest 2 Views  5/24/2017 8/7/2017      Radiology & Cardiology Orders     XR Chest 2 Views                 Follow-Up Appointment Instructions     Future Labs/Procedures    Follow Up and recommended labs and tests     Comments:    1.) Follow up with Jens Wise MD in Thoracic Surgery clinic in 2 weeks, prior to which a CXR, BMP, nutrition labs, and CBC should be performed.   2.) Follow up with the pain clinic within one week for further management of acute on chronic pain.      Follow-Up Appointment Instructions     Follow Up and recommended labs  and tests       1.) Follow up with Jens Wise MD in Thoracic Surgery clinic in 2 weeks, prior to which a CXR, BMP, nutrition labs, and CBC should be performed.   2.) Follow up with the pain clinic within one week for further management of acute on chronic pain.             Statement of Approval     Ordered          05/09/17 1133  I have reviewed and agree with all the recommendations and orders detailed in this document.  EFFECTIVE NOW     Approved and electronically signed by:  Saul Rogers PA-C

## 2017-05-03 NOTE — IP AVS SNAPSHOT
[address] [address]    Phone:  [phone]                                       After Visit Summary   5/3/2017    Minerva Blanco    MRN: [id number]           After Visit Summary Signature Page     I have received my discharge instructions, and my questions have been answered. I have discussed any challenges I see with this plan with the nurse or doctor.    ..........................................................................................................................................  Patient/Patient Representative Signature      ..........................................................................................................................................  Patient Representative Print Name and Relationship to Patient    ..................................................               ................................................  Date                                            Time    ..........................................................................................................................................  Reviewed by Signature/Title    ...................................................              ..............................................  Date                                                            Time

## 2017-05-04 ENCOUNTER — APPOINTMENT (OUTPATIENT)
Dept: SPEECH THERAPY | Facility: CLINIC | Age: 72
DRG: 177 | End: 2017-05-04
Attending: THORACIC SURGERY (CARDIOTHORACIC VASCULAR SURGERY)
Payer: MEDICARE

## 2017-05-04 LAB
ANION GAP SERPL CALCULATED.3IONS-SCNC: 9 MMOL/L (ref 3–14)
BACTERIA SPEC CULT: NORMAL
BUN SERPL-MCNC: 8 MG/DL (ref 7–30)
CALCIUM SERPL-MCNC: 7.8 MG/DL (ref 8.5–10.1)
CHLORIDE SERPL-SCNC: 103 MMOL/L (ref 94–109)
CO2 SERPL-SCNC: 26 MMOL/L (ref 20–32)
CREAT SERPL-MCNC: 0.43 MG/DL (ref 0.52–1.04)
ERYTHROCYTE [DISTWIDTH] IN BLOOD BY AUTOMATED COUNT: 16.8 % (ref 10–15)
GFR SERPL CREATININE-BSD FRML MDRD: ABNORMAL ML/MIN/1.7M2
GLUCOSE SERPL-MCNC: 106 MG/DL (ref 70–99)
HCT VFR BLD AUTO: 23.1 % (ref 35–47)
HGB BLD-MCNC: 7.3 G/DL (ref 11.7–15.7)
INR PPP: 3.8 (ref 0.86–1.14)
MCH RBC QN AUTO: 27.2 PG (ref 26.5–33)
MCHC RBC AUTO-ENTMCNC: 31.6 G/DL (ref 31.5–36.5)
MCV RBC AUTO: 86 FL (ref 78–100)
MICRO REPORT STATUS: NORMAL
PLATELET # BLD AUTO: 496 10E9/L (ref 150–450)
POTASSIUM SERPL-SCNC: 3.4 MMOL/L (ref 3.4–5.3)
RBC # BLD AUTO: 2.68 10E12/L (ref 3.8–5.2)
SODIUM SERPL-SCNC: 138 MMOL/L (ref 133–144)
SPECIMEN SOURCE: NORMAL
WBC # BLD AUTO: 11.8 10E9/L (ref 4–11)

## 2017-05-04 PROCEDURE — 92610 EVALUATE SWALLOWING FUNCTION: CPT | Mod: GN | Performed by: SPEECH-LANGUAGE PATHOLOGIST

## 2017-05-04 PROCEDURE — 27210429 ZZH NUTRITION PRODUCT INTERMEDIATE LITER

## 2017-05-04 PROCEDURE — 40000225 ZZH STATISTIC SLP WARD VISIT: Performed by: SPEECH-LANGUAGE PATHOLOGIST

## 2017-05-04 PROCEDURE — 92526 ORAL FUNCTION THERAPY: CPT | Mod: GN | Performed by: SPEECH-LANGUAGE PATHOLOGIST

## 2017-05-04 PROCEDURE — 85027 COMPLETE CBC AUTOMATED: CPT | Performed by: STUDENT IN AN ORGANIZED HEALTH CARE EDUCATION/TRAINING PROGRAM

## 2017-05-04 PROCEDURE — G8996 SWALLOW CURRENT STATUS: HCPCS | Mod: GN,CI | Performed by: SPEECH-LANGUAGE PATHOLOGIST

## 2017-05-04 PROCEDURE — 85610 PROTHROMBIN TIME: CPT | Performed by: STUDENT IN AN ORGANIZED HEALTH CARE EDUCATION/TRAINING PROGRAM

## 2017-05-04 PROCEDURE — 25000132 ZZH RX MED GY IP 250 OP 250 PS 637: Mod: GY | Performed by: STUDENT IN AN ORGANIZED HEALTH CARE EDUCATION/TRAINING PROGRAM

## 2017-05-04 PROCEDURE — 25800025 ZZH RX 258: Performed by: STUDENT IN AN ORGANIZED HEALTH CARE EDUCATION/TRAINING PROGRAM

## 2017-05-04 PROCEDURE — G8997 SWALLOW GOAL STATUS: HCPCS | Mod: GN,CI | Performed by: SPEECH-LANGUAGE PATHOLOGIST

## 2017-05-04 PROCEDURE — 12000008 ZZH R&B INTERMEDIATE UMMC

## 2017-05-04 PROCEDURE — A9270 NON-COVERED ITEM OR SERVICE: HCPCS | Mod: GY | Performed by: THORACIC SURGERY (CARDIOTHORACIC VASCULAR SURGERY)

## 2017-05-04 PROCEDURE — 36592 COLLECT BLOOD FROM PICC: CPT | Performed by: STUDENT IN AN ORGANIZED HEALTH CARE EDUCATION/TRAINING PROGRAM

## 2017-05-04 PROCEDURE — G0378 HOSPITAL OBSERVATION PER HR: HCPCS

## 2017-05-04 PROCEDURE — A9270 NON-COVERED ITEM OR SERVICE: HCPCS | Mod: GY | Performed by: STUDENT IN AN ORGANIZED HEALTH CARE EDUCATION/TRAINING PROGRAM

## 2017-05-04 PROCEDURE — G8998 SWALLOW D/C STATUS: HCPCS | Mod: GN,CI | Performed by: SPEECH-LANGUAGE PATHOLOGIST

## 2017-05-04 PROCEDURE — 25000128 H RX IP 250 OP 636: Performed by: STUDENT IN AN ORGANIZED HEALTH CARE EDUCATION/TRAINING PROGRAM

## 2017-05-04 PROCEDURE — 96376 TX/PRO/DX INJ SAME DRUG ADON: CPT

## 2017-05-04 PROCEDURE — 25000132 ZZH RX MED GY IP 250 OP 250 PS 637: Mod: GY | Performed by: THORACIC SURGERY (CARDIOTHORACIC VASCULAR SURGERY)

## 2017-05-04 PROCEDURE — 80048 BASIC METABOLIC PNL TOTAL CA: CPT | Performed by: STUDENT IN AN ORGANIZED HEALTH CARE EDUCATION/TRAINING PROGRAM

## 2017-05-04 RX ORDER — LORAZEPAM 0.5 MG/1
0.5 TABLET ORAL AT BEDTIME
Status: DISCONTINUED | OUTPATIENT
Start: 2017-05-04 | End: 2017-05-04 | Stop reason: CLARIF

## 2017-05-04 RX ORDER — OXYCODONE HCL 5 MG/5 ML
10-15 SOLUTION, ORAL ORAL
Status: DISCONTINUED | OUTPATIENT
Start: 2017-05-04 | End: 2017-05-09 | Stop reason: HOSPADM

## 2017-05-04 RX ORDER — NYSTATIN 100000/ML
1000000 SUSPENSION, ORAL (FINAL DOSE FORM) ORAL 4 TIMES DAILY PRN
Status: DISCONTINUED | OUTPATIENT
Start: 2017-05-04 | End: 2017-05-09 | Stop reason: HOSPADM

## 2017-05-04 RX ORDER — DIPHENOXYLATE HCL/ATROPINE 2.5-.025MG
1 TABLET ORAL 4 TIMES DAILY PRN
Status: DISCONTINUED | OUTPATIENT
Start: 2017-05-04 | End: 2017-05-08

## 2017-05-04 RX ORDER — FUROSEMIDE 20 MG
20 TABLET ORAL DAILY
Status: DISCONTINUED | OUTPATIENT
Start: 2017-05-04 | End: 2017-05-08

## 2017-05-04 RX ORDER — ALPRAZOLAM 0.5 MG
0.5 TABLET ORAL
Status: DISCONTINUED | OUTPATIENT
Start: 2017-05-04 | End: 2017-05-04 | Stop reason: CLARIF

## 2017-05-04 RX ORDER — SODIUM CHLORIDE, SODIUM LACTATE, POTASSIUM CHLORIDE, CALCIUM CHLORIDE 600; 310; 30; 20 MG/100ML; MG/100ML; MG/100ML; MG/100ML
INJECTION, SOLUTION INTRAVENOUS CONTINUOUS
Status: DISCONTINUED | OUTPATIENT
Start: 2017-05-04 | End: 2017-05-05

## 2017-05-04 RX ADMIN — OXYCODONE HYDROCHLORIDE 10 MG: 5 SOLUTION ORAL at 07:59

## 2017-05-04 RX ADMIN — RANITIDINE HYDROCHLORIDE 150 MG: 15 SOLUTION ORAL at 21:08

## 2017-05-04 RX ADMIN — METHOCARBAMOL 500 MG: 500 TABLET ORAL at 21:08

## 2017-05-04 RX ADMIN — HYOSCYAMINE SULFATE 125 MCG: 0.12 TABLET ORAL at 21:08

## 2017-05-04 RX ADMIN — PIPERACILLIN SODIUM,TAZOBACTAM SODIUM 4.5 G: 4; .5 INJECTION, POWDER, FOR SOLUTION INTRAVENOUS at 05:50

## 2017-05-04 RX ADMIN — LOPERAMIDE HYDROCHLORIDE 4 MG: 2 SOLUTION ORAL at 02:21

## 2017-05-04 RX ADMIN — MULTIVITAMIN 15 ML: LIQUID ORAL at 08:00

## 2017-05-04 RX ADMIN — OXYCODONE HYDROCHLORIDE 10 MG: 5 SOLUTION ORAL at 00:54

## 2017-05-04 RX ADMIN — DIPHENOXYLATE HYDROCHLORIDE AND ATROPINE SULFATE 1 TABLET: 2.5; .025 TABLET ORAL at 09:38

## 2017-05-04 RX ADMIN — ACETAMINOPHEN 975 MG: 160 SUSPENSION ORAL at 04:25

## 2017-05-04 RX ADMIN — HYOSCYAMINE SULFATE 125 MCG: 0.12 TABLET ORAL at 07:59

## 2017-05-04 RX ADMIN — PIPERACILLIN SODIUM,TAZOBACTAM SODIUM 4.5 G: 4; .5 INJECTION, POWDER, FOR SOLUTION INTRAVENOUS at 23:47

## 2017-05-04 RX ADMIN — GABAPENTIN 300 MG: 250 SOLUTION ORAL at 04:27

## 2017-05-04 RX ADMIN — CHLORASEPTIC 1 ML: 1.5 LIQUID ORAL at 08:08

## 2017-05-04 RX ADMIN — Medication 6 MG: at 07:59

## 2017-05-04 RX ADMIN — OXYCODONE HYDROCHLORIDE 15 MG: 5 SOLUTION ORAL at 21:07

## 2017-05-04 RX ADMIN — METOPROLOL TARTRATE 25 MG: 25 TABLET ORAL at 21:08

## 2017-05-04 RX ADMIN — OXYCODONE HYDROCHLORIDE 10 MG: 5 SOLUTION ORAL at 11:05

## 2017-05-04 RX ADMIN — PANTOPRAZOLE SODIUM 40 MG: 40 TABLET, DELAYED RELEASE ORAL at 21:08

## 2017-05-04 RX ADMIN — GABAPENTIN 300 MG: 250 SOLUTION ORAL at 13:09

## 2017-05-04 RX ADMIN — OXYCODONE HYDROCHLORIDE 10 MG: 5 SOLUTION ORAL at 14:53

## 2017-05-04 RX ADMIN — TRAZODONE HYDROCHLORIDE 50 MG: 50 TABLET ORAL at 21:08

## 2017-05-04 RX ADMIN — GABAPENTIN 300 MG: 250 SOLUTION ORAL at 21:08

## 2017-05-04 RX ADMIN — ACETAMINOPHEN 975 MG: 160 SUSPENSION ORAL at 13:04

## 2017-05-04 RX ADMIN — ACETAMINOPHEN 975 MG: 160 SUSPENSION ORAL at 21:08

## 2017-05-04 RX ADMIN — RANITIDINE HYDROCHLORIDE 150 MG: 15 SOLUTION ORAL at 08:00

## 2017-05-04 RX ADMIN — DIPHENOXYLATE HYDROCHLORIDE AND ATROPINE SULFATE 1 TABLET: 2.5; .025 TABLET ORAL at 15:16

## 2017-05-04 RX ADMIN — LEVOFLOXACIN 750 MG: 5 INJECTION, SOLUTION INTRAVENOUS at 14:53

## 2017-05-04 RX ADMIN — OXYCODONE HYDROCHLORIDE 10 MG: 5 SOLUTION ORAL at 04:23

## 2017-05-04 RX ADMIN — OXYCODONE HYDROCHLORIDE 15 MG: 5 SOLUTION ORAL at 23:52

## 2017-05-04 RX ADMIN — HYOSCYAMINE SULFATE 125 MCG: 0.12 TABLET ORAL at 14:53

## 2017-05-04 RX ADMIN — Medication 6 MG: at 14:53

## 2017-05-04 RX ADMIN — Medication 6 MG: at 02:21

## 2017-05-04 RX ADMIN — SODIUM CHLORIDE, POTASSIUM CHLORIDE, SODIUM LACTATE AND CALCIUM CHLORIDE: 600; 310; 30; 20 INJECTION, SOLUTION INTRAVENOUS at 09:40

## 2017-05-04 RX ADMIN — Medication 6 MG: at 21:08

## 2017-05-04 RX ADMIN — METHOCARBAMOL 500 MG: 500 TABLET ORAL at 13:04

## 2017-05-04 RX ADMIN — METHOCARBAMOL 500 MG: 500 TABLET ORAL at 17:58

## 2017-05-04 RX ADMIN — PIPERACILLIN SODIUM,TAZOBACTAM SODIUM 4.5 G: 4; .5 INJECTION, POWDER, FOR SOLUTION INTRAVENOUS at 00:49

## 2017-05-04 RX ADMIN — OXYCODONE HYDROCHLORIDE 15 MG: 5 SOLUTION ORAL at 17:59

## 2017-05-04 RX ADMIN — PIPERACILLIN SODIUM,TAZOBACTAM SODIUM 4.5 G: 4; .5 INJECTION, POWDER, FOR SOLUTION INTRAVENOUS at 13:04

## 2017-05-04 RX ADMIN — METHOCARBAMOL 500 MG: 500 TABLET ORAL at 07:59

## 2017-05-04 RX ADMIN — PIPERACILLIN SODIUM,TAZOBACTAM SODIUM 4.5 G: 4; .5 INJECTION, POWDER, FOR SOLUTION INTRAVENOUS at 17:59

## 2017-05-04 RX ADMIN — CHLORASEPTIC 1 ML: 1.5 LIQUID ORAL at 02:22

## 2017-05-04 NOTE — PROGRESS NOTES
[facility]          OUTPATIENT SWALLOW  EVALUATION  PLAN OF TREATMENT FOR OUTPATIENT REHABILITATION  (COMPLETE FOR INITIAL CLAIMS ONLY)  Patient's Last Name, First Name, M.I.  YOB: 1945  FrancisMinerva JIMENEZ     Provider's Name   [facility]   Medical Record No.  [id number]     Start of Care Date:   5/4/17   Onset Date:   5/4/17   Type:     ___PT   ____OT  ___X_SLP Medical Diagnosis:   Dysphagia     Treatment Diagnosis:   Dysphagia Visits from SOC:  1     _________________________________________________________________________________  Plan of Treatment/Functional Goals:                           Goals   1.  Pt will tolerate full liquid diet while adhering to safe swallowing precautions 100% of the time independently.     Goal met                           2.                             3.                             4.                             5.                            6.                              7.                              8.                                      No name on file.       I CERTIFY THE NEED FOR THESE SERVICES FURNISHED UNDER        THIS PLAN OF TREATMENT AND WHILE UNDER MY CARE     (Physician co-signature of this document indicates review and certification of the therapy plan).                                    Initial Assessment        See Epic Evaluation

## 2017-05-04 NOTE — PLAN OF CARE
Problem: Goal Outcome Summary  Goal: Goal Outcome Summary  Outcome: No Change  Hypotensive. Lopressor held this AM. MD notified. Afebrile. PICC intact. IV antibiotics infusing. Tube feed continuous at 40/hr. Constant diarrhea. Watery stool mixed with urine. Pt up to commode frequently. Working with Dietary to control diarrhea. Lomotil given which was somewhat effective. Will stop tube feed this PM to allow for rest and MD will reassess in AM. Pain not controlled. Oxy codone increased to 10-15mg q 3 hrs. Pt able to swallow small pills with pudding. Full liquid diet tolerated. C/O acid reflux after eating. Appetite poor. Chest incision reopening at base. Purulent drainage noted. Cleansed and new dressing placed. MD notified. Previous spit fistula site to left chest intact. Small serosanguineous drainage noted. NS used to cleans area and new dressing applied. Pt encouraged to elevate LE. Edema of +2-+3 noted. Compressing stockings ordered. Spouse at bedside. Will continue with POC.

## 2017-05-04 NOTE — PROGRESS NOTES
CLINICAL NUTRITION SERVICES - BRIEF NOTE    *ADDENDUM @ 1600: RN alerted RD that pt with a lot of diarrhea today and wasn't able to sleep last night due to diarrhea.  Spoke with primary team, okay to hold TF now or stop when pt goes to bed tonight per patient request.  Discussed this with RN.  Team/RD will reassess plan for TF tomorrow (cycle during the day vs consider switch formula to Peptamen 1.5 (semi-elemental, fiber-free) vs add soluble fiber pkts to bulk stool vs switch back to home TF recipe vs some other TF modification as appropriate)   Nutrition Prescription    Recommendations already ordered by Registered Dietitian (RD):  Change TF formulation due to ongoing diarrhea and pt request for a lower goal TF rate if possible.  Switch to TwoCal HN (fluid restricted, more calorically dense) @ goal 35 ml/hr (840 ml/day) to provide 1680 kcals, 71 g PRO, 588 ml free H2O, 184 g CHO and 4 g Fiber daily. This meets 100% estimated kcal and protein needs  -- 60 mL q4h free water flushes for FT patency (may need to increase if FT becomes clogged as TwoCal HN is a thicker formula)       - Current TF (pt's home recipe): 2 cans Isosource 1.5 + 2 cans TwoCal HN (960 mL/day) run @ 40 mL/hr  - Pt reports frequent diarrhea since admit yesterday, she believes is related to new antibiotics as she was not having diarrhea of this frequency at home.  Pt unsure if she wants to add soluble fiber to TF formula, but is interested in switching formula to just TwoCal HN as she thinks a lower goal rate would be helpful (writer also advised that TwoCal has less fiber in general than her current TF recipe which may be more tolerated with current diarrhea)    INTERVENTIONS  Implementation  Collaboration with other providers - discussed pt in rounds and with primary team, request RD check in with pt about potential TF modifications given diarrhea  Enteral Nutrition - Modify composition  Feeding tube flush  Nutrition education for recommended  modifications: discussed above changes with patient    Monitoring/Evaluation  Progress toward goals will be monitored and evaluated per protocol.     Evie Montanez RD, LD   [address]B RD Pager: [phone]

## 2017-05-04 NOTE — PROGRESS NOTES
[facility]  Patient is currently open to home care services with [facility].  The patient is currently receiving RN services.  [facility]  and team have been notified of patient admission.  [facility] liaison will continue to follow patient during stay.  If appropriate provide orders to resume home care at time of discharge.    Arlen Callaway RN, BSN  [facility] Liaison  [phone]

## 2017-05-04 NOTE — PROGRESS NOTES
Care Coordinator Progress Note     Admission Date/Time:  5/3/2017  Attending MD:  Jens Wise*     Data  Chart reviewed, discussed with interdisciplinary team.   Patient was admitted for:    Esophageal perforation  Long-term (current) use of anticoagulants  Hx of esophagectomy.    Concerns with insurance coverage for discharge needs: None.  Current Living Situation: Patient lives with spouse.  Support System: Supportive and Involved    Enrique is primary care giver  Services Involved: DME, Home Care and Home Infusion  Transportation: Family or Friend will provide  Barriers to Discharge: medical clearance         Assessment  Patient is a 72 year old female with history of esophageal perforation, s/p retrosternal gastric pull-through, spit fistula takedown, lysis of adhesions, J tube placement 4/17.  Recently discharged to home 4/28 and is now readmitted with pneumonia.  Patient is currently on IV abx, per MD will discharge to home on po abx when medically ready.  Patient is open to [facility](P: [phone], F: [phone]) for home enteral nutrition and [facility](P: [phone], F: [phone]) for skilled nursing visits and PT/OT. Resumption orders placed and [facility] liaison, updated, and [facility] liaison, updated.  CC will continue to follow and assist with discharge planning as needed.        Plan  Anticipated Discharge Date: 1-2 days  Anticipated Discharge Plan: Home with resumption of home care and home infusion services.      Sarina Orellana, RNCC  [phone]

## 2017-05-04 NOTE — PLAN OF CARE
Problem: Goal Outcome Summary  Goal: Goal Outcome Summary  Speech Language Therapy Discharge Summary     Reason for therapy discharge:    All goals and outcomes met, no further needs identified.     Progress towards therapy goal(s). See goals on Care Plan in [facility] electronic health record for goal details.  Goals met     Therapy recommendation(s):    Reevaluation may be indicated when pt initiating solids

## 2017-05-04 NOTE — PROGRESS NOTES
05/04/17 1035   General Information   Onset Date 05/03/17   Start of Care Date 05/04/17   Referring Physician Dr. Blas Dominguez   Patient Profile Review/OT: Additional Occupational Profile Info See Profile for full history and prior level of function   Patient/Family Goals Statement Pt stated no goals at time of evaluation   Swallowing Evaluation Bedside swallow evaluation   Behaviorial Observations WFL (within functional limits)   Mode of current nutrition Oral diet  (GJ)   Type of oral diet Other (Comment)  (Full liquid)   Respiratory Status O2 Supply   Comments Orders received for clinical swallowing evaluation. Pt is a 72 year old woman with complex medical history including leak s/p topuet flundoplication s/p multiple procedures, ultimately she underwent esophagectomy and spit fistual in 1/2017. She presented in min April and required lab retrosternal gastric pull through, resection of the left half of manubrium, spit fistula takedown, J-tube, pharyngectomy tube which was placed, replaced and now removed. Pt on clear liquid diet since January 2017. VFSS completed 4/24/17 which demonstrated single episode of flash penetration on thin liquid. Pt currently hospitalized due to pneumonia.    Clinical Swallow Evaluation   Oral Musculature generally intact   Dentition present and adequate   Mucosal Quality good   Mandibular Strength and Mobility intact   Oral Labial Strength and Mobility WFL   Lingual Strength and Mobility WFL   Velar Elevation intact   Buccal Strength and Mobility intact   Laryngeal Function Cough;Throat clear;Voicing initiated;Swallow   Clinical Swallow Eval: Thin Liquid Texture Trial   Mode of Presentation, Thin Liquids straw;self-fed   Volume of Liquid or Food Presented straw sip x11   Oral Phase of Swallow WFL   Pharyngeal Phase of Swallow intact   Diagnostic Statement No overt clinical s/sx of aspiration/penetration noted on thin liquid trials.    Clinical Swallow Eval: Puree Solid Texture Trial  "  Mode of Presentation, Puree spoon;self-fed   Volume of Puree Presented quarter tsp trials x14   Oral Phase, Puree WFL   Pharyngeal Phase, Puree intact   Diagnostic Statement No overt clinical s/sx of aspiration/penetration noted on pudding consistency trials.    Swallow Compensations   Swallow Compensations Reduce amounts   Results No difficulties noted   Esophageal Phase of Swallow   Esophageal comments See history. Pt reports feeling like foods and liquid \"come back up\" from time to time. Worse with thicker consistencies.    General Therapy Interventions   Planned Therapy Interventions Dysphagia Treatment   Dysphagia treatment Oropharyngeal exercise training   Swallow Eval: Clinical Impressions   Skilled Criteria for Therapy Intervention Skilled criteria met.  Treatment indicated.   Functional Assessment Scale (FAS) 6   Treatment Diagnosis Minimal oropharyngeal dysphagia   Diet texture recommendations Full liquid  (Per medical team)   Recommended Feeding/Eating Techniques alternate between small bites and sips of food/liquid;maintain upright posture during/after eating for 30 mins;small sips/bites   Predicted Duration of Therapy Intervention (days/wks) Evaluation and single treatment session.   Anticipated Discharge Disposition home   Risks and Benefits of Treatment have been explained. Yes   Patient, family and/or staff in agreement with Plan of Care Yes   Clinical Impression Comments Clinical swallow eval completed per MD orders. Pt demonstrates minimal oropharyngeal dysphagia as characterized by multiple swallows on larger trials of pudding. Pt demonstrated no overt clinical s/sx of aspiration/penetration on any consistency trialed. Adequate ROM and strength of oral mechanism demonstrated. No oral stasis observed. Voice clear throughout evaluation. Pt reported no feeling of reflux during evaluation. Recommend full liquid diet as cleared by thoracic team. Pt to advance as tolerated following their guidelines. " Pt provided training on pharyngeal strengthening exercises to maximize swallow function while pt is not taking solid consistencies. No further SLP services indicated. Please reconsult as needed or if advancing to solids.    Total Evaluation Time   Total Evaluation Time (Minutes) 10

## 2017-05-04 NOTE — PROGRESS NOTES
Thoracic Surgery Progress Note    Subjective:  No acute events overnight. Afebrile. Pain well controlled overnight. Continues to have diarrhea as prior to admission.    Objective:  BP 97/51 (BP Location: Right arm)  Pulse 76  Temp 96  F (35.6  C) (Oral)  Resp 16  Ht 1.524 m (5')  Wt 44.5 kg (98 lb 3.2 oz)  SpO2 98%  BMI 19.18 kg/m2    I/O last 3 completed shifts:  In: 870 [P.O.:120; I.V.:230; NG/GT:200]  Out: 200 [Urine:200]    NAD, alert  RRR  NLB on RA, incisions C/D/I  Abd soft, NTND  WWP    Labs: Reviewed.  WBC 11 today    Assessment/Plan:  Minerva Blanco is a 72 year old female with history of leak s/p topuet fundoplication, s/p multiple procedures, ultimately esophagectomy, 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   Now readmitted 5/3 for pneumonia.    Neuro: continue home regimen as previously recommended by Pain Service  CV: HDS.  Pulm: Nebs PRN.   FEN/GI: FLD.  : no issues  ID: IV Zosyn/Levaquin  Endo: no issues  Heme: warfarin per pharmacy dosing  PPx: warfarin, protonix  Dispo: 7B    Discussed with fellow, Dr. Whitlock.    Blas Dominguez MD  PGY1 Thoracic Surgery

## 2017-05-04 NOTE — PLAN OF CARE
Problem: Goal Outcome Summary  Goal: Goal Outcome Summary  Outcome: No Change  /49 (BP Location: Right arm)  Pulse 98  Temp 98.4  F (36.9  C) (Oral)  Resp 16  Ht 1.524 m (5')  Wt 44.5 kg (98 lb 3.2 oz)  SpO2 98%  BMI 19.18 kg/m2   Afebrile, soft BP, pain is controlled with oxy via J-tube, J-tube is intact, tube feeding infusing at 40ml/hr, tolerating TF, tolerating meds via J-tube when given slowly. Up the commode with SBA, reports having loose stools. +BS, voiding adequately. Cont with POC

## 2017-05-04 NOTE — PLAN OF CARE
Problem: Goal Outcome Summary  Goal: Goal Outcome Summary  Clinical swallow eval completed per MD orders. Pt demonstrates minimal oropharyngeal dysphagia as characterized by multiple swallows on larger trials of pudding. Pt demonstrated no overt clinical s/sx of aspiration/penetration on any consistency trialed. Adequate ROM and strength of oral mechanism demonstrated. No oral stasis observed. Voice clear throughout evaluation. Pt reported no feeling of reflux during evaluation. Recommend full liquid diet as cleared by thoracic team. Pt to advance as tolerated following their guidelines. Sit pt upright for all po intake. Encourage small bites/sips and slow rate. Pt ok to take medication orally with thin liquid or puree consistency. Pt provided training on pharyngeal strengthening exercises to maximize swallow function while pt is not taking solid consistencies. No further SLP services indicated. Please reconsult as needed or if advancing to solids.

## 2017-05-04 NOTE — PLAN OF CARE
Problem: Goal Outcome Summary  Goal: Goal Outcome Summary  Outcome: No Change  Vital signs:  Temp: 96  F (35.6  C) Temp src: Oral BP: 97/51 Pulse: 76   Resp: 16 SpO2: 98 % O2 Device: None (Room air)   Afebrile. Soft BP. Sats 98% on RA. Oxycodone prn for abdominal pain via J-tube given slowly via J-tube. Denies nausea. Up to commode with SBA. Pt continues to have frequent loose stools. Imodium prn x1. Voiding adequately. Zosyn given per order via PICC. Tube Feeding infusing at goal rate 40 ml/hr.

## 2017-05-04 NOTE — PLAN OF CARE
Problem: Goal Outcome Summary  Goal: Goal Outcome Summary  Outcome: No Change  VSS, afebrile. Pain not controlled. MD notified. Frequency increased. Icy Hot patches given for right shoulder pain. Lidoderm patch to mid back. Diet advanced to Full liquids. Pt not tolerating. C/O queasy sensation with scant emesis. Refused antiemetic. Tube feed infusing at goal. 40/hr. All medication through j-tube. PICC line dressing intact. Placement verified. IV antibiotics given. Pt requested commode at bedside d/t loose stools. None noted this shift. Pt encouraged to ambulate to bathroom. Pt concerned she will not make it d/t history of diarrhea with incontinence.  Incontinent briefs given for comfort. Will continue with POC.

## 2017-05-05 LAB
ERYTHROCYTE [DISTWIDTH] IN BLOOD BY AUTOMATED COUNT: 17.2 % (ref 10–15)
HCT VFR BLD AUTO: 24.9 % (ref 35–47)
HGB BLD-MCNC: 7.9 G/DL (ref 11.7–15.7)
INR PPP: 3.5 (ref 0.86–1.14)
MCH RBC QN AUTO: 27.6 PG (ref 26.5–33)
MCHC RBC AUTO-ENTMCNC: 31.7 G/DL (ref 31.5–36.5)
MCV RBC AUTO: 87 FL (ref 78–100)
PLATELET # BLD AUTO: 745 10E9/L (ref 150–450)
RBC # BLD AUTO: 2.86 10E12/L (ref 3.8–5.2)
WBC # BLD AUTO: 11 10E9/L (ref 4–11)

## 2017-05-05 PROCEDURE — 27210437 ZZH NUTRITION PRODUCT SEMIELEM INTERMED LITER

## 2017-05-05 PROCEDURE — 85027 COMPLETE CBC AUTOMATED: CPT | Performed by: STUDENT IN AN ORGANIZED HEALTH CARE EDUCATION/TRAINING PROGRAM

## 2017-05-05 PROCEDURE — 25000125 ZZHC RX 250: Performed by: STUDENT IN AN ORGANIZED HEALTH CARE EDUCATION/TRAINING PROGRAM

## 2017-05-05 PROCEDURE — A9270 NON-COVERED ITEM OR SERVICE: HCPCS | Mod: GY | Performed by: THORACIC SURGERY (CARDIOTHORACIC VASCULAR SURGERY)

## 2017-05-05 PROCEDURE — 25000128 H RX IP 250 OP 636: Performed by: STUDENT IN AN ORGANIZED HEALTH CARE EDUCATION/TRAINING PROGRAM

## 2017-05-05 PROCEDURE — 40000802 ZZH SITE CHECK

## 2017-05-05 PROCEDURE — 25000132 ZZH RX MED GY IP 250 OP 250 PS 637: Mod: GY | Performed by: THORACIC SURGERY (CARDIOTHORACIC VASCULAR SURGERY)

## 2017-05-05 PROCEDURE — 36592 COLLECT BLOOD FROM PICC: CPT | Performed by: THORACIC SURGERY (CARDIOTHORACIC VASCULAR SURGERY)

## 2017-05-05 PROCEDURE — 36592 COLLECT BLOOD FROM PICC: CPT | Performed by: STUDENT IN AN ORGANIZED HEALTH CARE EDUCATION/TRAINING PROGRAM

## 2017-05-05 PROCEDURE — A9270 NON-COVERED ITEM OR SERVICE: HCPCS | Mod: GY | Performed by: STUDENT IN AN ORGANIZED HEALTH CARE EDUCATION/TRAINING PROGRAM

## 2017-05-05 PROCEDURE — 85610 PROTHROMBIN TIME: CPT | Performed by: THORACIC SURGERY (CARDIOTHORACIC VASCULAR SURGERY)

## 2017-05-05 PROCEDURE — 25000132 ZZH RX MED GY IP 250 OP 250 PS 637: Mod: GY | Performed by: SURGERY

## 2017-05-05 PROCEDURE — 25000132 ZZH RX MED GY IP 250 OP 250 PS 637: Mod: GY | Performed by: STUDENT IN AN ORGANIZED HEALTH CARE EDUCATION/TRAINING PROGRAM

## 2017-05-05 PROCEDURE — 12000008 ZZH R&B INTERMEDIATE UMMC

## 2017-05-05 RX ORDER — SODIUM CHLORIDE, SODIUM LACTATE, POTASSIUM CHLORIDE, CALCIUM CHLORIDE 600; 310; 30; 20 MG/100ML; MG/100ML; MG/100ML; MG/100ML
INJECTION, SOLUTION INTRAVENOUS CONTINUOUS
Status: DISCONTINUED | OUTPATIENT
Start: 2017-05-05 | End: 2017-05-09 | Stop reason: HOSPADM

## 2017-05-05 RX ADMIN — METOPROLOL TARTRATE 25 MG: 25 TABLET ORAL at 20:24

## 2017-05-05 RX ADMIN — HYOSCYAMINE SULFATE 125 MCG: 0.12 TABLET ORAL at 08:35

## 2017-05-05 RX ADMIN — OXYCODONE HYDROCHLORIDE 15 MG: 5 SOLUTION ORAL at 03:57

## 2017-05-05 RX ADMIN — OXYCODONE HYDROCHLORIDE 15 MG: 5 SOLUTION ORAL at 08:34

## 2017-05-05 RX ADMIN — OXYCODONE HYDROCHLORIDE 15 MG: 5 SOLUTION ORAL at 18:04

## 2017-05-05 RX ADMIN — LOPERAMIDE HYDROCHLORIDE 4 MG: 2 SOLUTION ORAL at 16:06

## 2017-05-05 RX ADMIN — HYOSCYAMINE SULFATE 125 MCG: 0.12 TABLET ORAL at 20:24

## 2017-05-05 RX ADMIN — DIPHENOXYLATE HYDROCHLORIDE AND ATROPINE SULFATE 1 TABLET: 2.5; .025 TABLET ORAL at 16:02

## 2017-05-05 RX ADMIN — DIPHENOXYLATE HYDROCHLORIDE AND ATROPINE SULFATE 1 TABLET: 2.5; .025 TABLET ORAL at 20:59

## 2017-05-05 RX ADMIN — PIPERACILLIN SODIUM,TAZOBACTAM SODIUM 4.5 G: 4; .5 INJECTION, POWDER, FOR SOLUTION INTRAVENOUS at 12:09

## 2017-05-05 RX ADMIN — METHOCARBAMOL 500 MG: 500 TABLET ORAL at 08:35

## 2017-05-05 RX ADMIN — ACETAMINOPHEN 975 MG: 160 SUSPENSION ORAL at 12:08

## 2017-05-05 RX ADMIN — GABAPENTIN 300 MG: 250 SOLUTION ORAL at 20:59

## 2017-05-05 RX ADMIN — Medication 6 MG: at 20:24

## 2017-05-05 RX ADMIN — NYSTATIN 1000000 UNITS: 500000 SUSPENSION ORAL at 10:42

## 2017-05-05 RX ADMIN — ACETAMINOPHEN 975 MG: 160 SUSPENSION ORAL at 03:57

## 2017-05-05 RX ADMIN — LIDOCAINE 1 PATCH: 50 PATCH CUTANEOUS at 08:37

## 2017-05-05 RX ADMIN — Medication 6 MG: at 02:36

## 2017-05-05 RX ADMIN — HYOSCYAMINE SULFATE 125 MCG: 0.12 TABLET ORAL at 15:06

## 2017-05-05 RX ADMIN — GABAPENTIN 300 MG: 250 SOLUTION ORAL at 12:09

## 2017-05-05 RX ADMIN — OXYCODONE HYDROCHLORIDE 15 MG: 5 SOLUTION ORAL at 12:08

## 2017-05-05 RX ADMIN — METHOCARBAMOL 500 MG: 500 TABLET ORAL at 12:10

## 2017-05-05 RX ADMIN — RANITIDINE HYDROCHLORIDE 150 MG: 15 SOLUTION ORAL at 20:24

## 2017-05-05 RX ADMIN — LEVOFLOXACIN 750 MG: 5 INJECTION, SOLUTION INTRAVENOUS at 13:28

## 2017-05-05 RX ADMIN — METHOCARBAMOL 500 MG: 500 TABLET ORAL at 16:05

## 2017-05-05 RX ADMIN — PANTOPRAZOLE SODIUM 40 MG: 40 TABLET, DELAYED RELEASE ORAL at 20:23

## 2017-05-05 RX ADMIN — SODIUM CHLORIDE, PRESERVATIVE FREE 5 ML: 5 INJECTION INTRAVENOUS at 11:56

## 2017-05-05 RX ADMIN — Medication 6 MG: at 15:06

## 2017-05-05 RX ADMIN — PIPERACILLIN SODIUM,TAZOBACTAM SODIUM 4.5 G: 4; .5 INJECTION, POWDER, FOR SOLUTION INTRAVENOUS at 23:59

## 2017-05-05 RX ADMIN — OXYCODONE HYDROCHLORIDE 15 MG: 5 SOLUTION ORAL at 20:59

## 2017-05-05 RX ADMIN — Medication 6 MG: at 08:35

## 2017-05-05 RX ADMIN — MENTHOL 1 PATCH: 205.5 PATCH TOPICAL at 21:02

## 2017-05-05 RX ADMIN — MULTIVITAMIN 15 ML: LIQUID ORAL at 08:35

## 2017-05-05 RX ADMIN — DIPHENOXYLATE HYDROCHLORIDE AND ATROPINE SULFATE 1 TABLET: 2.5; .025 TABLET ORAL at 12:10

## 2017-05-05 RX ADMIN — RANITIDINE HYDROCHLORIDE 150 MG: 15 SOLUTION ORAL at 08:35

## 2017-05-05 RX ADMIN — METHOCARBAMOL 500 MG: 500 TABLET ORAL at 20:23

## 2017-05-05 RX ADMIN — OXYCODONE HYDROCHLORIDE 10 MG: 5 SOLUTION ORAL at 15:06

## 2017-05-05 RX ADMIN — GABAPENTIN 300 MG: 250 SOLUTION ORAL at 03:58

## 2017-05-05 RX ADMIN — PIPERACILLIN SODIUM,TAZOBACTAM SODIUM 4.5 G: 4; .5 INJECTION, POWDER, FOR SOLUTION INTRAVENOUS at 06:18

## 2017-05-05 RX ADMIN — PIPERACILLIN SODIUM,TAZOBACTAM SODIUM 4.5 G: 4; .5 INJECTION, POWDER, FOR SOLUTION INTRAVENOUS at 18:11

## 2017-05-05 RX ADMIN — NYSTATIN 1000000 UNITS: 500000 SUSPENSION ORAL at 15:59

## 2017-05-05 RX ADMIN — Medication 75 MG: at 20:58

## 2017-05-05 RX ADMIN — NYSTATIN 1000000 UNITS: 500000 SUSPENSION ORAL at 20:21

## 2017-05-05 RX ADMIN — ACETAMINOPHEN 975 MG: 160 SUSPENSION ORAL at 20:23

## 2017-05-05 ASSESSMENT — PAIN DESCRIPTION - DESCRIPTORS: DESCRIPTORS: DISCOMFORT

## 2017-05-05 NOTE — PROGRESS NOTES
CLINICAL NUTRITION SERVICES - REASSESSMENT NOTE  *ADDENDUM 5/5 @ 6858: alerted by RN that when new TF formula arrived (Peptamen 1.5) pt requested to start it at @45 mL/hr and is still running at that rate (was ordered to start @ 25 mL/hr and adv by 10 mL q4h as tolerated to goal).  Pt with loose stools since TF started and is requesting TF be off tonight as was done last night (with different TF formula).  RD advised RN to lower TF to 15 mL/hr and adv by 10 mL q8h as tolerated to goal rate 45 mL/hr, adjusted TF advancement orders. Paged team about advancement schedule changes.     Nutrition Prescription    RECOMMENDATIONS FOR MDs/PROVIDERS TO ORDER:  1. Adjust IVF/free water per team discretion  2. Pt would like to try a Mixed Berry Ensure Clear once no longer NPO    Malnutrition Status:    Severe malnutrition in the context of acute illness    Recommendations already ordered by Registered Dietitian (RD):  *ADDENDUM 5/5 @ 3479: start TF @ 15 mL/hr and adv by 10 mL q8h as tolerated to goal rate 45 mL/hr  1. Switch to semi-elemental, free-free TF formula given ongoing diarrhea issues.  Peptamen 1.5 @ goal 45 ml/hr (1080 ml/day) to provide 1620 kcals, 73 g PRO, 832 ml free H2O, 60 g Fat (70% from MCTs), 203 g CHO and no Fiber daily.  -- Would rec to start @ 25 mL/hr and adv by 10 mL q4h as tolerated to goal rate 45 mL/hr  -- 30 mL q4h free water flushes for FT patency. Additional flushes may be required once off IVF pending PO intake adequacy    2. D/c Nutrisource fiber    Future/Additional Recommendations:  Could consider adding additional Nutrisource fiber packets (source of soluble fiber) to help bulk stool.  Peptamen 1.5 does not contain any fiber.     EVALUATION OF THE PROGRESS TOWARD GOALS   Diet: NPO    Nutrition Support: TwoCal HN @ 35 ml/hr (840 ml/day) to provide 1680 kcals, 71 g PRO, 588 ml free H2O, 184 g CHO and 4 g Fiber daily.     Intake: Pt admit 5/3 and started on home regimen 2 cans TwoCal HN + 2 cans  Isosource 1.5 @ 40 mL/hr.  Pt with ongoing, frequent diarrhea, so switched to just TwoCal HN on 5/4 (lower goal rate required, less fiber), but diarrhea persisted so TF held overnight.  Pt agreeable to restarting TF today which a different formula.     NEW FINDINGS   Meds: LR @ 50 mL/hr. 1 pkt Nutrisource fiber daily.  GI: Pt reports no diarrhea overnight after TF stopped ~6 PM, but did have some loose stools this morning    MALNUTRITION  % Intake: Suspect decreased intake does not meet criteria given unclear intakes PTA  % Weight Loss: > 2% in 1 week (severe)  Subcutaneous Fat Loss: Facial region, Upper arm, Lower arm and Thoracic/intercostal: Moderate  Muscle Loss: Temporal, Facial & jaw region, Thoracic region (clavicle, acromium bone, deltoid, trapezius, pectoral), Upper arm (bicep, tricep), Lower arm (forearm), Patellar region and Posterior calf: Moderate  Fluid Accumulation/Edema: None noted per provider note  Malnutrition Diagnosis: Severe malnutrition in the context of acute illness    Previous Goals   Total avg nutritional intake to meet a minimum of 30 kcal/kg and 1.5 g PRO/kg daily (per dosing wt 41 kg).  Evaluation: Not met    Previous Nutrition Diagnosis  Inadequate protein-energy intake related to increased needs post-op/repletion, inadequate EN infustion at home as evidenced by 2% wt loss in last 5 days, while at home and pt report.    Evaluation: No change, updated    CURRENT NUTRITION DIAGNOSIS  Inadequate enteral nutrition infusion related to TF held 2/2 diarrhea as evidenced by TF off overnight and inadequate intakes PTA    INTERVENTIONS  Implementation  Collaboration with other providers: discussed above plan with primary team  Enteral Nutrition - Modify composition and rate  Nutrition education for recommended modifications: discussed above TF changes with pt, pt agreeable  Medical Food Supplement Therapy: pt request to try an Ensure Clear    Goals  Total avg nutritional intake to meet a minimum  of 30 kcal/kg and 1.5 g PRO/kg daily (per dosing wt 45 kg).    Monitoring/Evaluation  Progress toward goals will be monitored and evaluated per protocol.     Evie Montanez RD, LD   7B RD Pager: [phone]

## 2017-05-05 NOTE — PLAN OF CARE
Problem: Goal Outcome Summary  Goal: Goal Outcome Summary  Outcome: No Change  VSS, afebrile. Pain controlled. Up independently in room. Tube feed infusing. Patient continues to have loose stools. Diet changed to NPO with ice chips d/t aspiration concerns. All meds given through J-tube. Bed bath given. Will continue with POC.

## 2017-05-05 NOTE — PLAN OF CARE
Problem: Individualization  Goal: Patient Preferences  Wound dressing sites all intact with no drainage. Tube feeding remain on hold. Jtube clamped. Sat is 98%RA. Commode by the bedside. Voiding adequately. No stool. Oxycodone 15 mg effective for pain. Resting well. Continue with plan of care.

## 2017-05-05 NOTE — PROGRESS NOTES
Thoracic Surgery Progress Note    Subjective:  No acute events overnight. Afebrile. Pain well controlled overnight. Diarrhea improving after TFs held overnight.    Objective:  /59 (BP Location: Right arm)  Pulse 100  Temp 96.4  F (35.8  C) (Oral)  Resp 16  Ht 1.524 m (5')  Wt 44.5 kg (98 lb 3.2 oz)  SpO2 96%  BMI 19.18 kg/m2    I/O last 3 completed shifts:  In: 1390.83 [P.O.:120; I.V.:1065.83; NG/GT:130]  Out: 1575 [Urine:600; Other:975]    NAD, alert  RRR  NLB on RA, incisions C/D/I  Abd soft, NTND  WWP    Labs: Reviewed.  Pending today    Assessment/Plan:  Minerva Blanco is a 72 year old female with history of leak s/p topuet fundoplication, s/p multiple procedures, ultimately esophagectomy, 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   Now readmitted 5/3 for pneumonia.    Neuro: continue regimen as previously recommended by Pain Service  CV: HDS.  Pulm: Nebs PRN.   FEN/GI: sips/chips. Continue Imodium/Lomotil/ToO. Will discuss with RD on TF plan.  : no issues  ID: IV Zosyn/Levaquin for PNA  Endo: no issues  Heme: warfarin per pharmacy dosing  PPx: warfarin, protonix  Dispo: 7B    Discussed with fellow, Dr. Whitlock.    Blas Dominguez MD  PGY1 Thoracic Surgery

## 2017-05-05 NOTE — PLAN OF CARE
Problem: Goal Outcome Summary  Goal: Goal Outcome Summary  Outcome: No Change  /47 (BP Location: Right arm)  Pulse 100  Temp 96.3  F (35.7  C) (Oral)  Resp 16  Ht 1.524 m (5')  Wt 44.5 kg (98 lb 3.2 oz)  SpO2 99%  BMI 19.18 kg/m2   Afebrile, pain is controlled with oxy x2 via j-tube, tube feeds stopped at 6P per nutrition, pt reported not tolerating tube feeds, reports loose stools have improved since tube feeds were stopped, up to the commode with SBA, loose stools x3. +BS. Voiding adequately. Dressing in the mid chest, CDI. Cont with POC

## 2017-05-06 LAB
ANION GAP SERPL CALCULATED.3IONS-SCNC: 7 MMOL/L (ref 3–14)
BUN SERPL-MCNC: 4 MG/DL (ref 7–30)
CALCIUM SERPL-MCNC: 8 MG/DL (ref 8.5–10.1)
CHLORIDE SERPL-SCNC: 106 MMOL/L (ref 94–109)
CO2 SERPL-SCNC: 26 MMOL/L (ref 20–32)
CREAT SERPL-MCNC: 0.42 MG/DL (ref 0.52–1.04)
ERYTHROCYTE [DISTWIDTH] IN BLOOD BY AUTOMATED COUNT: 17.1 % (ref 10–15)
GFR SERPL CREATININE-BSD FRML MDRD: ABNORMAL ML/MIN/1.7M2
GLUCOSE SERPL-MCNC: 100 MG/DL (ref 70–99)
HCT VFR BLD AUTO: 24.7 % (ref 35–47)
HGB BLD-MCNC: 7.6 G/DL (ref 11.7–15.7)
INR PPP: 3.04 (ref 0.86–1.14)
MAGNESIUM SERPL-MCNC: 2 MG/DL (ref 1.6–2.3)
MCH RBC QN AUTO: 26.9 PG (ref 26.5–33)
MCHC RBC AUTO-ENTMCNC: 30.8 G/DL (ref 31.5–36.5)
MCV RBC AUTO: 87 FL (ref 78–100)
PLATELET # BLD AUTO: 749 10E9/L (ref 150–450)
POTASSIUM SERPL-SCNC: 3 MMOL/L (ref 3.4–5.3)
POTASSIUM SERPL-SCNC: 3.8 MMOL/L (ref 3.4–5.3)
RBC # BLD AUTO: 2.83 10E12/L (ref 3.8–5.2)
SODIUM SERPL-SCNC: 139 MMOL/L (ref 133–144)
WBC # BLD AUTO: 11.3 10E9/L (ref 4–11)

## 2017-05-06 PROCEDURE — 36592 COLLECT BLOOD FROM PICC: CPT | Performed by: THORACIC SURGERY (CARDIOTHORACIC VASCULAR SURGERY)

## 2017-05-06 PROCEDURE — 40000802 ZZH SITE CHECK

## 2017-05-06 PROCEDURE — 27210437 ZZH NUTRITION PRODUCT SEMIELEM INTERMED LITER

## 2017-05-06 PROCEDURE — 80048 BASIC METABOLIC PNL TOTAL CA: CPT | Performed by: THORACIC SURGERY (CARDIOTHORACIC VASCULAR SURGERY)

## 2017-05-06 PROCEDURE — A9270 NON-COVERED ITEM OR SERVICE: HCPCS | Mod: GY | Performed by: STUDENT IN AN ORGANIZED HEALTH CARE EDUCATION/TRAINING PROGRAM

## 2017-05-06 PROCEDURE — 25000128 H RX IP 250 OP 636: Performed by: SURGERY

## 2017-05-06 PROCEDURE — 25000128 H RX IP 250 OP 636: Performed by: STUDENT IN AN ORGANIZED HEALTH CARE EDUCATION/TRAINING PROGRAM

## 2017-05-06 PROCEDURE — 83735 ASSAY OF MAGNESIUM: CPT | Performed by: THORACIC SURGERY (CARDIOTHORACIC VASCULAR SURGERY)

## 2017-05-06 PROCEDURE — 25800025 ZZH RX 258: Performed by: SURGERY

## 2017-05-06 PROCEDURE — 25000132 ZZH RX MED GY IP 250 OP 250 PS 637: Mod: GY | Performed by: STUDENT IN AN ORGANIZED HEALTH CARE EDUCATION/TRAINING PROGRAM

## 2017-05-06 PROCEDURE — 12000008 ZZH R&B INTERMEDIATE UMMC

## 2017-05-06 PROCEDURE — A9270 NON-COVERED ITEM OR SERVICE: HCPCS | Mod: GY | Performed by: THORACIC SURGERY (CARDIOTHORACIC VASCULAR SURGERY)

## 2017-05-06 PROCEDURE — 25000125 ZZHC RX 250: Performed by: SURGERY

## 2017-05-06 PROCEDURE — 25000132 ZZH RX MED GY IP 250 OP 250 PS 637: Mod: GY | Performed by: THORACIC SURGERY (CARDIOTHORACIC VASCULAR SURGERY)

## 2017-05-06 PROCEDURE — 85027 COMPLETE CBC AUTOMATED: CPT | Performed by: THORACIC SURGERY (CARDIOTHORACIC VASCULAR SURGERY)

## 2017-05-06 PROCEDURE — 84132 ASSAY OF SERUM POTASSIUM: CPT | Performed by: THORACIC SURGERY (CARDIOTHORACIC VASCULAR SURGERY)

## 2017-05-06 PROCEDURE — 85610 PROTHROMBIN TIME: CPT | Performed by: THORACIC SURGERY (CARDIOTHORACIC VASCULAR SURGERY)

## 2017-05-06 PROCEDURE — 25000132 ZZH RX MED GY IP 250 OP 250 PS 637: Mod: GY | Performed by: SURGERY

## 2017-05-06 RX ORDER — POTASSIUM CHLORIDE 7.45 MG/ML
10 INJECTION INTRAVENOUS
Status: DISCONTINUED | OUTPATIENT
Start: 2017-05-06 | End: 2017-05-09 | Stop reason: HOSPADM

## 2017-05-06 RX ORDER — POTASSIUM CHLORIDE 1.5 G/1.58G
20-40 POWDER, FOR SOLUTION ORAL
Status: DISCONTINUED | OUTPATIENT
Start: 2017-05-06 | End: 2017-05-09 | Stop reason: HOSPADM

## 2017-05-06 RX ORDER — MAGNESIUM SULFATE HEPTAHYDRATE 40 MG/ML
4 INJECTION, SOLUTION INTRAVENOUS EVERY 4 HOURS PRN
Status: DISCONTINUED | OUTPATIENT
Start: 2017-05-06 | End: 2017-05-09 | Stop reason: HOSPADM

## 2017-05-06 RX ORDER — POTASSIUM CL/LIDO/0.9 % NACL 10MEQ/0.1L
10 INTRAVENOUS SOLUTION, PIGGYBACK (ML) INTRAVENOUS
Status: DISCONTINUED | OUTPATIENT
Start: 2017-05-06 | End: 2017-05-09 | Stop reason: HOSPADM

## 2017-05-06 RX ORDER — POTASSIUM CHLORIDE 29.8 MG/ML
20 INJECTION INTRAVENOUS
Status: DISCONTINUED | OUTPATIENT
Start: 2017-05-06 | End: 2017-05-09 | Stop reason: HOSPADM

## 2017-05-06 RX ORDER — POTASSIUM CHLORIDE 1.5 G/1.58G
20 POWDER, FOR SOLUTION ORAL DAILY
Status: DISCONTINUED | OUTPATIENT
Start: 2017-05-07 | End: 2017-05-09 | Stop reason: HOSPADM

## 2017-05-06 RX ADMIN — RANITIDINE HYDROCHLORIDE 150 MG: 15 SOLUTION ORAL at 08:20

## 2017-05-06 RX ADMIN — POTASSIUM CHLORIDE 10 MEQ: 14.9 INJECTION, SOLUTION, CONCENTRATE PARENTERAL at 14:43

## 2017-05-06 RX ADMIN — HYOSCYAMINE SULFATE 125 MCG: 0.12 TABLET ORAL at 13:11

## 2017-05-06 RX ADMIN — PANTOPRAZOLE SODIUM 40 MG: 40 TABLET, DELAYED RELEASE ORAL at 19:42

## 2017-05-06 RX ADMIN — METOPROLOL TARTRATE 25 MG: 25 TABLET ORAL at 19:38

## 2017-05-06 RX ADMIN — NYSTATIN 1000000 UNITS: 500000 SUSPENSION ORAL at 21:16

## 2017-05-06 RX ADMIN — OXYCODONE HYDROCHLORIDE 10 MG: 5 SOLUTION ORAL at 23:45

## 2017-05-06 RX ADMIN — LIDOCAINE 1 PATCH: 50 PATCH CUTANEOUS at 08:20

## 2017-05-06 RX ADMIN — OXYCODONE HYDROCHLORIDE 15 MG: 5 SOLUTION ORAL at 16:36

## 2017-05-06 RX ADMIN — OXYCODONE HYDROCHLORIDE 10 MG: 5 SOLUTION ORAL at 00:03

## 2017-05-06 RX ADMIN — OXYCODONE HYDROCHLORIDE 15 MG: 5 SOLUTION ORAL at 08:13

## 2017-05-06 RX ADMIN — Medication 75 MG: at 21:16

## 2017-05-06 RX ADMIN — POTASSIUM CHLORIDE 10 MEQ: 7.46 INJECTION, SOLUTION INTRAVENOUS at 11:16

## 2017-05-06 RX ADMIN — DIPHENOXYLATE HYDROCHLORIDE AND ATROPINE SULFATE 1 TABLET: 2.5; .025 TABLET ORAL at 21:16

## 2017-05-06 RX ADMIN — GABAPENTIN 300 MG: 250 SOLUTION ORAL at 05:34

## 2017-05-06 RX ADMIN — ACETAMINOPHEN 975 MG: 160 SUSPENSION ORAL at 19:43

## 2017-05-06 RX ADMIN — Medication 0.5 MG: at 18:19

## 2017-05-06 RX ADMIN — SODIUM CHLORIDE, POTASSIUM CHLORIDE, SODIUM LACTATE AND CALCIUM CHLORIDE: 600; 310; 30; 20 INJECTION, SOLUTION INTRAVENOUS at 10:39

## 2017-05-06 RX ADMIN — METOPROLOL TARTRATE 25 MG: 25 TABLET ORAL at 08:20

## 2017-05-06 RX ADMIN — OXYCODONE HYDROCHLORIDE 15 MG: 5 SOLUTION ORAL at 19:43

## 2017-05-06 RX ADMIN — ACETAMINOPHEN 975 MG: 160 SUSPENSION ORAL at 05:31

## 2017-05-06 RX ADMIN — GABAPENTIN 300 MG: 250 SOLUTION ORAL at 13:11

## 2017-05-06 RX ADMIN — Medication 6 MG: at 03:15

## 2017-05-06 RX ADMIN — METHOCARBAMOL 500 MG: 500 TABLET ORAL at 19:38

## 2017-05-06 RX ADMIN — OXYCODONE HYDROCHLORIDE 10 MG: 5 SOLUTION ORAL at 03:15

## 2017-05-06 RX ADMIN — Medication 6 MG: at 08:13

## 2017-05-06 RX ADMIN — GABAPENTIN 300 MG: 250 SOLUTION ORAL at 19:40

## 2017-05-06 RX ADMIN — LEVOFLOXACIN 750 MG: 5 INJECTION, SOLUTION INTRAVENOUS at 13:02

## 2017-05-06 RX ADMIN — POTASSIUM CHLORIDE 10 MEQ: 14.9 INJECTION, SOLUTION, CONCENTRATE PARENTERAL at 16:33

## 2017-05-06 RX ADMIN — POTASSIUM CHLORIDE 10 MEQ: 7.46 INJECTION, SOLUTION INTRAVENOUS at 22:37

## 2017-05-06 RX ADMIN — POTASSIUM CHLORIDE 10 MEQ: 7.46 INJECTION, SOLUTION INTRAVENOUS at 21:36

## 2017-05-06 RX ADMIN — POTASSIUM CHLORIDE 10 MEQ: 7.46 INJECTION, SOLUTION INTRAVENOUS at 13:03

## 2017-05-06 RX ADMIN — ACETAMINOPHEN 975 MG: 160 SUSPENSION ORAL at 13:11

## 2017-05-06 RX ADMIN — METHOCARBAMOL 500 MG: 500 TABLET ORAL at 08:20

## 2017-05-06 RX ADMIN — HYOSCYAMINE SULFATE 125 MCG: 0.12 TABLET ORAL at 19:41

## 2017-05-06 RX ADMIN — NYSTATIN 1000000 UNITS: 500000 SUSPENSION ORAL at 16:35

## 2017-05-06 RX ADMIN — HYOSCYAMINE SULFATE 125 MCG: 0.12 TABLET ORAL at 08:19

## 2017-05-06 RX ADMIN — PIPERACILLIN SODIUM,TAZOBACTAM SODIUM 4.5 G: 4; .5 INJECTION, POWDER, FOR SOLUTION INTRAVENOUS at 05:42

## 2017-05-06 RX ADMIN — Medication 6 MG: at 13:11

## 2017-05-06 RX ADMIN — Medication 2 G: at 10:38

## 2017-05-06 RX ADMIN — METHOCARBAMOL 500 MG: 500 TABLET ORAL at 13:11

## 2017-05-06 RX ADMIN — OXYCODONE HYDROCHLORIDE 15 MG: 5 SOLUTION ORAL at 13:11

## 2017-05-06 RX ADMIN — LOPERAMIDE HYDROCHLORIDE 4 MG: 2 SOLUTION ORAL at 23:45

## 2017-05-06 RX ADMIN — Medication 6 MG: at 19:41

## 2017-05-06 RX ADMIN — METHOCARBAMOL 500 MG: 500 TABLET ORAL at 16:34

## 2017-05-06 RX ADMIN — LOPERAMIDE HYDROCHLORIDE 4 MG: 2 SOLUTION ORAL at 05:33

## 2017-05-06 RX ADMIN — MENTHOL 1 PATCH: 205.5 PATCH TOPICAL at 19:55

## 2017-05-06 RX ADMIN — DIPHENOXYLATE HYDROCHLORIDE AND ATROPINE SULFATE 1 TABLET: 2.5; .025 TABLET ORAL at 16:35

## 2017-05-06 ASSESSMENT — PAIN DESCRIPTION - DESCRIPTORS: DESCRIPTORS: CRAMPING;SORE

## 2017-05-06 NOTE — PLAN OF CARE
Problem: Goal Outcome Summary  Goal: Goal Outcome Summary  Outcome: No Change  Patient A&O x4. VS stable. O2 sats at 99% on room air. Tube feeding at rate of 25cc/hour and tolerating well. Patient is NPO but takes ice chips. Patient ambulates to bedside commode. Liquid bowel movement 300 mL today. Voided once. Mid chest incision well approximated, no drainage, no redness. Compression stockings and leg elevation with pillow. Suggested care: remove stockings and encourage coughing and deep breathing.

## 2017-05-06 NOTE — PLAN OF CARE
Problem: Individualization  Goal: Patient Preferences  Outcome: No Change  Vitals:     05/05/17 1651 05/05/17 1935 05/05/17 2200 05/06/17 0318   BP: 117/62 125/62 119/55 143/66   BP Location: Right arm Right arm Right arm Left arm   Pulse:       84   Resp: 16 16 16 18   Temp: 96.2  F (35.7  C) 96.8  F (36  C) 96.1  F (35.6  C) 96.3  F (35.7  C)   TempSrc: Oral Oral Oral Oral   SpO2: 99% 99% 97% 98%   Weight:   47.1 kg (103 lb 12.8 oz)       Height:           Patient with complaints of having loose stool on prior shift and did not want tube feeding advanced until 06.  Increased TF to 25cc/hr at 06, next increase at 1400.  Had small amt loose stool this shift-given lomotil x1.  Adequate UV.  Lungs with fine crackles in upper lobes and diminished in bases.  Complains of pain at Jtube insertion site-given oxycodone with adequate relief. Given zosyn per orders.  Has mild BLE edema.  Continue with POC.

## 2017-05-06 NOTE — PLAN OF CARE
Problem: Goal Outcome Summary  Goal: Goal Outcome Summary  Outcome: No Change  A/Ox4. Afebrile. VSS. Sats 100 RA. Denies SOB. Denies nausea. C/o pain to R shoulder, lido patch on, oxycodone given x2. Meds given through J-tube. Tolerating TF at 25mL/hr, increased to 35mL/hr at 14:00 (will need to be increased to goal 45mL/hr at 22:00. NPO ex ice chips. Spit fistula takedown site dressing C/D/I. Abd incision well healed, ROX. Independent to commode. Voiding adequate amounts. Having loose stools, occasional cramping. MIV infusing at 50 mL/hr, mag and K replaced per protocol. Abx given per orders. Continue POC.

## 2017-05-06 NOTE — PLAN OF CARE
Problem: Goal Outcome Summary  Goal: Goal Outcome Summary  Outcome: No Change  Tube feeding restarted @ 15cc/hr @ 1600. To be increased @ midnight. Pt reports stooling has decreased since 1800. Has had 150cc urine and stool mixed and 100cc stool since 1730. Reports mouth discomfort decreased since starting Nystatin. Up to commode independently. Medicated for pain every 3 hours. Able to make needs known.

## 2017-05-06 NOTE — PROGRESS NOTES
Thoracic Surgery Progress Note  Minerva Blanco  [id number]    Subjective  No acute events overnight.  No further reflux this AM.  Stools slowed.    Objective  Temp:  [96.1  F (35.6  C)-96.8  F (36  C)] 96.4  F (35.8  C)  Pulse:  [73-84] 73  Heart Rate:  [74-89] 74  Resp:  [16-18] 16  BP: (117-143)/(55-66) 117/60  SpO2:  [97 %-99 %] 99 %    Physical Exam:  Gen: Awake,   CVS: RRR  Resp: NLB on RA  Abd: Soft, nt/nd  Extrem: WWP    UOP appears adequate    K low, Cr normal  Hgb stable, WBC 11.3   INR supratherapeutic 3.04    Assessment & Plan  Minerva Blanco is a 72 year old female with history of leak s/p topuet fundoplication, s/p multiple procedures, ultimately esophagectomy, 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   Now readmitted 5/3 for pneumonia.     Neuro: Prn oxy, scheduled robaxin, gabapentin, levsin, lidocaine patches  CV: HDS, home metoprolol  Pulm: Prn NC, course of abx for presumed aspiration PNA  FEN/GI: Sips, re-eval swallow on Monday  : Adequate UOP, lasix qday, need to re-eval daily weight  ID: Narrowed to levaquin for PNA  Endo: No issues  Heme: INR supratherapeutic on admit, restarting low dose coumadin per pharmacy tonight  PPx: coumadin, protonix  Dispo: 7B, possible d/c early next week     Seen/discussed with fellow, Dr. Whitlock.    Alvin Dee MD  General Surgery PGY-3  [phone]

## 2017-05-07 LAB
ANION GAP SERPL CALCULATED.3IONS-SCNC: 6 MMOL/L (ref 3–14)
BUN SERPL-MCNC: 3 MG/DL (ref 7–30)
CALCIUM SERPL-MCNC: 8 MG/DL (ref 8.5–10.1)
CHLORIDE SERPL-SCNC: 107 MMOL/L (ref 94–109)
CO2 SERPL-SCNC: 25 MMOL/L (ref 20–32)
CREAT SERPL-MCNC: 0.38 MG/DL (ref 0.52–1.04)
GFR SERPL CREATININE-BSD FRML MDRD: ABNORMAL ML/MIN/1.7M2
GLUCOSE SERPL-MCNC: 95 MG/DL (ref 70–99)
INR PPP: 2.28 (ref 0.86–1.14)
MAGNESIUM SERPL-MCNC: 2.1 MG/DL (ref 1.6–2.3)
POTASSIUM SERPL-SCNC: 3.9 MMOL/L (ref 3.4–5.3)
SODIUM SERPL-SCNC: 138 MMOL/L (ref 133–144)

## 2017-05-07 PROCEDURE — 25000132 ZZH RX MED GY IP 250 OP 250 PS 637: Mod: GY | Performed by: STUDENT IN AN ORGANIZED HEALTH CARE EDUCATION/TRAINING PROGRAM

## 2017-05-07 PROCEDURE — A9270 NON-COVERED ITEM OR SERVICE: HCPCS | Mod: GY | Performed by: THORACIC SURGERY (CARDIOTHORACIC VASCULAR SURGERY)

## 2017-05-07 PROCEDURE — 25800025 ZZH RX 258: Performed by: SURGERY

## 2017-05-07 PROCEDURE — 36592 COLLECT BLOOD FROM PICC: CPT | Performed by: THORACIC SURGERY (CARDIOTHORACIC VASCULAR SURGERY)

## 2017-05-07 PROCEDURE — 12000008 ZZH R&B INTERMEDIATE UMMC

## 2017-05-07 PROCEDURE — 83735 ASSAY OF MAGNESIUM: CPT | Performed by: THORACIC SURGERY (CARDIOTHORACIC VASCULAR SURGERY)

## 2017-05-07 PROCEDURE — 25000128 H RX IP 250 OP 636: Performed by: STUDENT IN AN ORGANIZED HEALTH CARE EDUCATION/TRAINING PROGRAM

## 2017-05-07 PROCEDURE — A9270 NON-COVERED ITEM OR SERVICE: HCPCS | Mod: GY | Performed by: SURGERY

## 2017-05-07 PROCEDURE — 25000132 ZZH RX MED GY IP 250 OP 250 PS 637: Mod: GY | Performed by: THORACIC SURGERY (CARDIOTHORACIC VASCULAR SURGERY)

## 2017-05-07 PROCEDURE — 80048 BASIC METABOLIC PNL TOTAL CA: CPT | Performed by: THORACIC SURGERY (CARDIOTHORACIC VASCULAR SURGERY)

## 2017-05-07 PROCEDURE — 25000128 H RX IP 250 OP 636: Performed by: SURGERY

## 2017-05-07 PROCEDURE — A9270 NON-COVERED ITEM OR SERVICE: HCPCS | Mod: GY | Performed by: STUDENT IN AN ORGANIZED HEALTH CARE EDUCATION/TRAINING PROGRAM

## 2017-05-07 PROCEDURE — 27210437 ZZH NUTRITION PRODUCT SEMIELEM INTERMED LITER

## 2017-05-07 PROCEDURE — 85610 PROTHROMBIN TIME: CPT | Performed by: THORACIC SURGERY (CARDIOTHORACIC VASCULAR SURGERY)

## 2017-05-07 PROCEDURE — 25000132 ZZH RX MED GY IP 250 OP 250 PS 637: Mod: GY | Performed by: SURGERY

## 2017-05-07 RX ORDER — WARFARIN SODIUM 1 MG/1
2 TABLET ORAL
Status: COMPLETED | OUTPATIENT
Start: 2017-05-07 | End: 2017-05-07

## 2017-05-07 RX ADMIN — GABAPENTIN 300 MG: 250 SOLUTION ORAL at 13:07

## 2017-05-07 RX ADMIN — POTASSIUM CHLORIDE 20 MEQ: 1.5 POWDER, FOR SOLUTION ORAL at 08:14

## 2017-05-07 RX ADMIN — LOPERAMIDE HYDROCHLORIDE 4 MG: 2 SOLUTION ORAL at 13:06

## 2017-05-07 RX ADMIN — FUROSEMIDE 20 MG: 20 TABLET ORAL at 08:04

## 2017-05-07 RX ADMIN — ACETAMINOPHEN 975 MG: 160 SUSPENSION ORAL at 20:15

## 2017-05-07 RX ADMIN — GABAPENTIN 300 MG: 250 SOLUTION ORAL at 20:15

## 2017-05-07 RX ADMIN — METHOCARBAMOL 500 MG: 500 TABLET ORAL at 13:05

## 2017-05-07 RX ADMIN — OXYCODONE HYDROCHLORIDE 15 MG: 5 SOLUTION ORAL at 21:09

## 2017-05-07 RX ADMIN — ACETAMINOPHEN 975 MG: 160 SUSPENSION ORAL at 04:40

## 2017-05-07 RX ADMIN — MULTIVITAMIN 15 ML: LIQUID ORAL at 08:03

## 2017-05-07 RX ADMIN — METOPROLOL TARTRATE 25 MG: 25 TABLET ORAL at 08:03

## 2017-05-07 RX ADMIN — HYOSCYAMINE SULFATE 125 MCG: 0.12 TABLET ORAL at 20:15

## 2017-05-07 RX ADMIN — METOPROLOL TARTRATE 25 MG: 25 TABLET ORAL at 20:13

## 2017-05-07 RX ADMIN — GABAPENTIN 300 MG: 250 SOLUTION ORAL at 04:41

## 2017-05-07 RX ADMIN — METHOCARBAMOL 500 MG: 500 TABLET ORAL at 17:06

## 2017-05-07 RX ADMIN — LOPERAMIDE HYDROCHLORIDE 4 MG: 2 SOLUTION ORAL at 08:03

## 2017-05-07 RX ADMIN — Medication 6 MG: at 08:10

## 2017-05-07 RX ADMIN — SODIUM CHLORIDE, POTASSIUM CHLORIDE, SODIUM LACTATE AND CALCIUM CHLORIDE: 600; 310; 30; 20 INJECTION, SOLUTION INTRAVENOUS at 09:45

## 2017-05-07 RX ADMIN — METHOCARBAMOL 500 MG: 500 TABLET ORAL at 20:13

## 2017-05-07 RX ADMIN — OXYCODONE HYDROCHLORIDE 15 MG: 5 SOLUTION ORAL at 08:10

## 2017-05-07 RX ADMIN — Medication 75 MG: at 21:54

## 2017-05-07 RX ADMIN — HYOSCYAMINE SULFATE 125 MCG: 0.12 TABLET ORAL at 13:05

## 2017-05-07 RX ADMIN — Medication 6 MG: at 13:07

## 2017-05-07 RX ADMIN — HYOSCYAMINE SULFATE 125 MCG: 0.12 TABLET ORAL at 08:03

## 2017-05-07 RX ADMIN — OXYCODONE HYDROCHLORIDE 15 MG: 5 SOLUTION ORAL at 13:06

## 2017-05-07 RX ADMIN — OXYCODONE HYDROCHLORIDE 10 MG: 5 SOLUTION ORAL at 02:40

## 2017-05-07 RX ADMIN — Medication 6 MG: at 20:15

## 2017-05-07 RX ADMIN — DIPHENOXYLATE HYDROCHLORIDE AND ATROPINE SULFATE 1 TABLET: 2.5; .025 TABLET ORAL at 17:06

## 2017-05-07 RX ADMIN — WARFARIN SODIUM 2 MG: 1 TABLET ORAL at 17:50

## 2017-05-07 RX ADMIN — OXYCODONE HYDROCHLORIDE 15 MG: 5 SOLUTION ORAL at 17:06

## 2017-05-07 RX ADMIN — ACETAMINOPHEN 975 MG: 160 SUSPENSION ORAL at 13:06

## 2017-05-07 RX ADMIN — Medication 6 MG: at 02:40

## 2017-05-07 RX ADMIN — METHOCARBAMOL 500 MG: 500 TABLET ORAL at 08:04

## 2017-05-07 RX ADMIN — POTASSIUM CHLORIDE 10 MEQ: 7.46 INJECTION, SOLUTION INTRAVENOUS at 09:45

## 2017-05-07 RX ADMIN — LIDOCAINE 1 PATCH: 50 PATCH CUTANEOUS at 08:01

## 2017-05-07 RX ADMIN — LEVOFLOXACIN 750 MG: 5 INJECTION, SOLUTION INTRAVENOUS at 13:08

## 2017-05-07 RX ADMIN — PANTOPRAZOLE SODIUM 40 MG: 40 TABLET, DELAYED RELEASE ORAL at 20:15

## 2017-05-07 RX ADMIN — POTASSIUM CHLORIDE 10 MEQ: 7.46 INJECTION, SOLUTION INTRAVENOUS at 10:59

## 2017-05-07 NOTE — PLAN OF CARE
Problem: Goal Outcome Summary  Goal: Goal Outcome Summary  Outcome: No Change  Tube feeding running at 35cc/hr throughout shift. Declined to have rate increased @ 2200, wants to stay at present rate overnight. Potassium replaced per protocol. Redraw 3.8, replaced again. Continues to have watery stools. Up to bedside commode independently. Able to make needs known.

## 2017-05-07 NOTE — PLAN OF CARE
Problem: Goal Outcome Summary  Goal: Goal Outcome Summary  Outcome: No Change  A/Ox4. Afebrile. VSS. Sats 97% RA. Denies SOB. Denies nausea. Pain controlled, lido patch on, oxycodone given x2. Meds given through J-tube. Tolerating TF at 25mL/hr throughout shift, increased to 35mL/hr at 14:00, will need to be increased to goal 45mL/hr as tolerated. NPO ex ice chips. Spit fistula takedown site dressing C/D/I. Independent to commode. Voiding adequate amounts. Having loose stools, occasional cramping. MIV infusing at 50 mL/hr, K replaced per protocol. Abx given per orders. Continue POC.

## 2017-05-07 NOTE — PLAN OF CARE
Problem: Individualization  Goal: Patient Preferences  Outcome: No Change  Blood pressure 129/64, pulse 81, temperature 96.3  F (35.7  C), temperature source Oral, resp. rate 16, height 1.524 m (5'), weight 47.8 kg (105 lb 4.8 oz), SpO2 99 %, not currently breastfeeding.  Patient complained of having frequent loose stool-wanted tube feeding off.  Gave imodium and patient agreed to  turned TF down to 15cc/hr for shift.    Had lse stool x1.  Voiding good amts.  Took oxycodone x2 for pain with relief.  Has crackles lft side, clear right.  Slept between cares.  Continue with POC.

## 2017-05-07 NOTE — PROGRESS NOTES
Thoracic Surgery Progress Note  Minerva Blanco  [id number]    Subjective  No acute events overnight. Reflux improving.  Stools slowed.    Objective  Temp:  [96.3  F (35.7  C)-96.5  F (35.8  C)] 96.5  F (35.8  C)  Pulse:  [81-94] 86  Resp:  [16] 16  BP: (111-148)/(57-72) 111/57  SpO2:  [93 %-100 %] 97 %    Physical Exam:  Gen: Awake,   CVS: RRR  Resp: NLB on RA  Abd: Soft, nt/nd  Extrem: WWP    UOP appears adequate    Labs reviewed.    Assessment & Plan  Minerva Blanco is a 72 year old female with history of leak s/p topuet fundoplication, s/p multiple procedures, ultimately esophagectomy, 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   Now readmitted 5/3 for pneumonia.     Neuro: Prn oxy, scheduled robaxin, gabapentin, levsin, lidocaine patches  CV: HDS, home metoprolol  Pulm: Prn NC, course of abx for presumed aspiration PNA  FEN/GI: Sips, re-eval swallow on Monday  : Adequate UOP, lasix qday, need to re-eval daily weight  ID: Narrowed to levaquin for PNA  Endo: No issues  Heme: INR supratherapeutic on admit, restarting low dose coumadin per pharmacy tonight  PPx: coumadin, protonix  Dispo: 7B, possible d/c early next week     Seen/discussed with fellow, Dr. Whitlock.    Blas Dominguez MD  PGY-1 General Surgery

## 2017-05-08 ENCOUNTER — APPOINTMENT (OUTPATIENT)
Dept: SPEECH THERAPY | Facility: CLINIC | Age: 72
DRG: 177 | End: 2017-05-08
Attending: THORACIC SURGERY (CARDIOTHORACIC VASCULAR SURGERY)
Payer: MEDICARE

## 2017-05-08 ENCOUNTER — APPOINTMENT (OUTPATIENT)
Dept: GENERAL RADIOLOGY | Facility: CLINIC | Age: 72
DRG: 177 | End: 2017-05-08
Attending: THORACIC SURGERY (CARDIOTHORACIC VASCULAR SURGERY)
Payer: MEDICARE

## 2017-05-08 LAB
ABO + RH BLD: NORMAL
ABO + RH BLD: NORMAL
ANION GAP SERPL CALCULATED.3IONS-SCNC: 9 MMOL/L (ref 3–14)
BLD GP AB SCN SERPL QL: NORMAL
BLD PROD TYP BPU: NORMAL
BLD PROD TYP BPU: NORMAL
BLD UNIT ID BPU: 0
BLOOD BANK CMNT PATIENT-IMP: NORMAL
BLOOD PRODUCT CODE: NORMAL
BPU ID: NORMAL
BUN SERPL-MCNC: 3 MG/DL (ref 7–30)
CALCIUM SERPL-MCNC: 8 MG/DL (ref 8.5–10.1)
CHLORIDE SERPL-SCNC: 105 MMOL/L (ref 94–109)
CO2 SERPL-SCNC: 26 MMOL/L (ref 20–32)
CREAT SERPL-MCNC: 0.36 MG/DL (ref 0.52–1.04)
ERYTHROCYTE [DISTWIDTH] IN BLOOD BY AUTOMATED COUNT: 17.4 % (ref 10–15)
GFR SERPL CREATININE-BSD FRML MDRD: ABNORMAL ML/MIN/1.7M2
GLUCOSE SERPL-MCNC: 87 MG/DL (ref 70–99)
HCT VFR BLD AUTO: 21.8 % (ref 35–47)
HGB BLD-MCNC: 6.7 G/DL (ref 11.7–15.7)
INR PPP: 2.1 (ref 0.86–1.14)
MCH RBC QN AUTO: 26.6 PG (ref 26.5–33)
MCHC RBC AUTO-ENTMCNC: 30.7 G/DL (ref 31.5–36.5)
MCV RBC AUTO: 87 FL (ref 78–100)
NUM BPU REQUESTED: 1
PLATELET # BLD AUTO: 826 10E9/L (ref 150–450)
POTASSIUM SERPL-SCNC: 3.9 MMOL/L (ref 3.4–5.3)
RBC # BLD AUTO: 2.52 10E12/L (ref 3.8–5.2)
SODIUM SERPL-SCNC: 140 MMOL/L (ref 133–144)
SPECIMEN EXP DATE BLD: NORMAL
TRANSFUSION STATUS PATIENT QL: NORMAL
TRANSFUSION STATUS PATIENT QL: NORMAL
WBC # BLD AUTO: 13.4 10E9/L (ref 4–11)

## 2017-05-08 PROCEDURE — 12000008 ZZH R&B INTERMEDIATE UMMC

## 2017-05-08 PROCEDURE — 85610 PROTHROMBIN TIME: CPT | Performed by: THORACIC SURGERY (CARDIOTHORACIC VASCULAR SURGERY)

## 2017-05-08 PROCEDURE — 86901 BLOOD TYPING SEROLOGIC RH(D): CPT | Performed by: STUDENT IN AN ORGANIZED HEALTH CARE EDUCATION/TRAINING PROGRAM

## 2017-05-08 PROCEDURE — 36592 COLLECT BLOOD FROM PICC: CPT | Performed by: SURGERY

## 2017-05-08 PROCEDURE — 27210437 ZZH NUTRITION PRODUCT SEMIELEM INTERMED LITER

## 2017-05-08 PROCEDURE — A9270 NON-COVERED ITEM OR SERVICE: HCPCS | Mod: GY | Performed by: STUDENT IN AN ORGANIZED HEALTH CARE EDUCATION/TRAINING PROGRAM

## 2017-05-08 PROCEDURE — 86900 BLOOD TYPING SEROLOGIC ABO: CPT | Performed by: STUDENT IN AN ORGANIZED HEALTH CARE EDUCATION/TRAINING PROGRAM

## 2017-05-08 PROCEDURE — A9270 NON-COVERED ITEM OR SERVICE: HCPCS | Mod: GY | Performed by: THORACIC SURGERY (CARDIOTHORACIC VASCULAR SURGERY)

## 2017-05-08 PROCEDURE — 25000128 H RX IP 250 OP 636: Performed by: STUDENT IN AN ORGANIZED HEALTH CARE EDUCATION/TRAINING PROGRAM

## 2017-05-08 PROCEDURE — 25000132 ZZH RX MED GY IP 250 OP 250 PS 637: Mod: GY | Performed by: STUDENT IN AN ORGANIZED HEALTH CARE EDUCATION/TRAINING PROGRAM

## 2017-05-08 PROCEDURE — 74230 X-RAY XM SWLNG FUNCJ C+: CPT

## 2017-05-08 PROCEDURE — 92611 MOTION FLUOROSCOPY/SWALLOW: CPT | Mod: GN

## 2017-05-08 PROCEDURE — 86850 RBC ANTIBODY SCREEN: CPT | Performed by: STUDENT IN AN ORGANIZED HEALTH CARE EDUCATION/TRAINING PROGRAM

## 2017-05-08 PROCEDURE — A9270 NON-COVERED ITEM OR SERVICE: HCPCS | Mod: GY | Performed by: SURGERY

## 2017-05-08 PROCEDURE — 40000802 ZZH SITE CHECK

## 2017-05-08 PROCEDURE — 86923 COMPATIBILITY TEST ELECTRIC: CPT | Performed by: STUDENT IN AN ORGANIZED HEALTH CARE EDUCATION/TRAINING PROGRAM

## 2017-05-08 PROCEDURE — 71020 XR CHEST 2 VW: CPT

## 2017-05-08 PROCEDURE — 25000132 ZZH RX MED GY IP 250 OP 250 PS 637: Mod: GY | Performed by: THORACIC SURGERY (CARDIOTHORACIC VASCULAR SURGERY)

## 2017-05-08 PROCEDURE — P9016 RBC LEUKOCYTES REDUCED: HCPCS | Performed by: STUDENT IN AN ORGANIZED HEALTH CARE EDUCATION/TRAINING PROGRAM

## 2017-05-08 PROCEDURE — 40000225 ZZH STATISTIC SLP WARD VISIT

## 2017-05-08 PROCEDURE — 25000132 ZZH RX MED GY IP 250 OP 250 PS 637: Mod: GY | Performed by: SURGERY

## 2017-05-08 PROCEDURE — 80048 BASIC METABOLIC PNL TOTAL CA: CPT | Performed by: THORACIC SURGERY (CARDIOTHORACIC VASCULAR SURGERY)

## 2017-05-08 PROCEDURE — 85027 COMPLETE CBC AUTOMATED: CPT | Performed by: SURGERY

## 2017-05-08 RX ORDER — ONDANSETRON 2 MG/ML
4-8 INJECTION INTRAMUSCULAR; INTRAVENOUS EVERY 6 HOURS PRN
Status: DISCONTINUED | OUTPATIENT
Start: 2017-05-08 | End: 2017-05-09 | Stop reason: HOSPADM

## 2017-05-08 RX ORDER — WARFARIN SODIUM 2.5 MG/1
2.5 TABLET ORAL
Status: COMPLETED | OUTPATIENT
Start: 2017-05-08 | End: 2017-05-08

## 2017-05-08 RX ORDER — ONDANSETRON 4 MG/1
4-8 TABLET, FILM COATED ORAL EVERY 6 HOURS PRN
Status: DISCONTINUED | OUTPATIENT
Start: 2017-05-08 | End: 2017-05-09 | Stop reason: HOSPADM

## 2017-05-08 RX ORDER — FERROUS SULFATE 325(65) MG
325 TABLET ORAL DAILY
Status: DISCONTINUED | OUTPATIENT
Start: 2017-05-08 | End: 2017-05-08

## 2017-05-08 RX ORDER — DIPHENOXYLATE HCL/ATROPINE 2.5-.025/5
5 LIQUID (ML) ORAL 4 TIMES DAILY PRN
Status: DISCONTINUED | OUTPATIENT
Start: 2017-05-08 | End: 2017-05-09 | Stop reason: HOSPADM

## 2017-05-08 RX ORDER — HYOSCYAMINE SULFATE 0.12 MG/ML
0.12 LIQUID ORAL 3 TIMES DAILY
Status: DISCONTINUED | OUTPATIENT
Start: 2017-05-08 | End: 2017-05-09 | Stop reason: HOSPADM

## 2017-05-08 RX ORDER — FUROSEMIDE 10 MG/ML
20 SOLUTION ORAL DAILY
Status: DISCONTINUED | OUTPATIENT
Start: 2017-05-08 | End: 2017-05-09 | Stop reason: HOSPADM

## 2017-05-08 RX ORDER — GUAR GUM
1 PACKET (EA) ORAL 3 TIMES DAILY
Status: DISCONTINUED | OUTPATIENT
Start: 2017-05-08 | End: 2017-05-09 | Stop reason: HOSPADM

## 2017-05-08 RX ADMIN — OXYCODONE HYDROCHLORIDE 15 MG: 5 SOLUTION ORAL at 20:03

## 2017-05-08 RX ADMIN — MINERAL SUPPLEMENT IRON 300 MG / 5 ML STRENGTH LIQUID 100 PER BOX UNFLAVORED 300 MG: at 18:38

## 2017-05-08 RX ADMIN — Medication 6 MG: at 08:40

## 2017-05-08 RX ADMIN — OXYCODONE HYDROCHLORIDE 15 MG: 5 SOLUTION ORAL at 08:40

## 2017-05-08 RX ADMIN — HYOSCYAMINE SULFATE 125 MCG: 0.12 TABLET ORAL at 15:35

## 2017-05-08 RX ADMIN — POTASSIUM CHLORIDE 20 MEQ: 1.5 POWDER, FOR SOLUTION ORAL at 08:48

## 2017-05-08 RX ADMIN — METOPROLOL TARTRATE 25 MG: 25 TABLET ORAL at 08:44

## 2017-05-08 RX ADMIN — LIDOCAINE 1 PATCH: 50 PATCH CUTANEOUS at 08:49

## 2017-05-08 RX ADMIN — Medication 6 MG: at 20:04

## 2017-05-08 RX ADMIN — ONDANSETRON 4 MG: 2 INJECTION INTRAMUSCULAR; INTRAVENOUS at 22:20

## 2017-05-08 RX ADMIN — ACETAMINOPHEN 975 MG: 160 SUSPENSION ORAL at 20:03

## 2017-05-08 RX ADMIN — METHOCARBAMOL 500 MG: 500 TABLET ORAL at 12:58

## 2017-05-08 RX ADMIN — WARFARIN SODIUM 2.5 MG: 2.5 TABLET ORAL at 18:39

## 2017-05-08 RX ADMIN — LEVOFLOXACIN 750 MG: 5 INJECTION, SOLUTION INTRAVENOUS at 12:58

## 2017-05-08 RX ADMIN — Medication 6 MG: at 02:12

## 2017-05-08 RX ADMIN — PANTOPRAZOLE SODIUM 40 MG: 40 TABLET, DELAYED RELEASE ORAL at 20:04

## 2017-05-08 RX ADMIN — OXYCODONE HYDROCHLORIDE 10 MG: 5 SOLUTION ORAL at 12:58

## 2017-05-08 RX ADMIN — OXYCODONE HYDROCHLORIDE 15 MG: 5 SOLUTION ORAL at 16:30

## 2017-05-08 RX ADMIN — Medication 6 MG: at 15:33

## 2017-05-08 RX ADMIN — ACETAMINOPHEN 975 MG: 160 SUSPENSION ORAL at 12:58

## 2017-05-08 RX ADMIN — GABAPENTIN 300 MG: 250 SOLUTION ORAL at 12:58

## 2017-05-08 RX ADMIN — GABAPENTIN 300 MG: 250 SOLUTION ORAL at 04:48

## 2017-05-08 RX ADMIN — MULTIVITAMIN 15 ML: LIQUID ORAL at 08:44

## 2017-05-08 RX ADMIN — ACETAMINOPHEN 975 MG: 160 SUSPENSION ORAL at 04:48

## 2017-05-08 RX ADMIN — LOPERAMIDE HYDROCHLORIDE 4 MG: 2 SOLUTION ORAL at 15:35

## 2017-05-08 RX ADMIN — METHOCARBAMOL 500 MG: 500 TABLET ORAL at 08:44

## 2017-05-08 RX ADMIN — Medication 1 PACKET: at 20:05

## 2017-05-08 RX ADMIN — TRAZODONE HYDROCHLORIDE 75 MG: 150 TABLET ORAL at 20:32

## 2017-05-08 RX ADMIN — HYOSCYAMINE SULFATE 125 MCG: 0.12 TABLET ORAL at 08:44

## 2017-05-08 RX ADMIN — METHOCARBAMOL 500 MG: 500 TABLET ORAL at 20:03

## 2017-05-08 RX ADMIN — GABAPENTIN 300 MG: 250 SOLUTION ORAL at 20:04

## 2017-05-08 RX ADMIN — HYOSCYAMINE SULFATE 0.12 MG: 0.12 SOLUTION/ DROPS ORAL at 20:04

## 2017-05-08 RX ADMIN — METOPROLOL TARTRATE 25 MG: 100 TABLET, FILM COATED ORAL at 20:04

## 2017-05-08 RX ADMIN — DIPHENOXYLATE HYDROCHLORIDE AND ATROPINE SULFATE 1 TABLET: 2.5; .025 TABLET ORAL at 12:58

## 2017-05-08 RX ADMIN — DIPHENOXYLATE HYDROCHLORIDE AND ATROPINE SULFATE 1 TABLET: 2.5; .025 TABLET ORAL at 08:59

## 2017-05-08 NOTE — PROGRESS NOTES
Thoracic Surgery Daily Progress Note  Minerva Blanco  05/08/2017    Subjective:   No acute events overnight. Feels reflux symptoms are improving.     Objective:    /78 (BP Location: Left arm)  Pulse 94  Temp 96.3  F (35.7  C) (Oral)  Resp 16  Ht 1.524 m (5')  Wt 47.9 kg (105 lb 11.2 oz)  SpO2 97%  BMI 20.64 kg/m2  Laying comfortably in bed in no acute distress  Awake, alert and appropriate.   Nonlabored breathing  Regular rate and rhythm  Abrasions on chest healing, dressing changed  Extremities warm.     Ins/Outs:   I/O last 3 completed shifts:  In: 1690 [I.V.:970; NG/GT:290]  Out: 4250 [Urine:500; Other:3750]    Labs:   WBC 13.4  Hgb 6.7    Recent Imaging:   No new images    Assessment:  Minerva Blanco is a 72 year old female with a history of a toupet fundoplication complicated by a leak requiring multiple subsequent procedures, most recently status post a gastric pull through, resection of manubrium, spit fistula takedown, J-tube, and phargynostomy tube placement on 4/17, no readmitted on 5/3 for pneumonia.     Plan:       Neuro: Continue current oral medications.     Cardiovascular/Heme: Continue home metoprolol, HDS    Respiratory/Pulm: Continue antibiotics with levaquin for pneumonia, aggressive pulmonary toilet. Will get CXR today given leukocytosis     FEN/Gastrointestinal: Swallow eval today.     Renal/Genitourinary:  Voiding freely, continue lasix.     Infectious: On levaquin for pneumonia.    Heme: 1 UPRBC for anemia, resume iron.      Prophy: Continue coumadin, protonix.     Dispo: Possibly later this week.     Patient was seen and discussed with Dr. Whitlock, Thoracic Surgery Fellow, who agrees with the assessment and plan and will discuss with staff.     Viki Carmen MD  General Surgery  Pager: [phone]

## 2017-05-08 NOTE — PLAN OF CARE
Problem: Goal Outcome Summary  Goal: Goal Outcome Summary  Video swallow evaluation completed per MD order. Water soluble contrast used per MD. Pt demonstrated functional oral-pharyngeal swallow for thin liquids on exam. swallow response timely and improved epiglottoc movement noted compared to last VFSS (4/24/17). No penetration or aspiration observed on exam. Ok for clear liquid diet from oral-pharyngeal perspective. Pt to sit upright for all PO intake and take small sips. Will defer to medical team for diet advance as appropriate from surgical perspective. Do not anticipate oral-pharyngeal difficulties with diet advance. No ongoing ST needs indicated at this time. Pt to continue pharyngeal strengthening exercises independently. will sign off.

## 2017-05-08 NOTE — PLAN OF CARE
Problem: Goal Outcome Summary  Goal: Goal Outcome Summary  Outcome: No Change  /58 (BP Location: Left arm)  Pulse 90  Temp 96.6  F (35.9  C) (Oral)  Resp 16  Ht 1.524 m (5')  Wt 47.9 kg (105 lb 11.2 oz)  SpO2 99%  BMI 20.64 kg/m2     0750-9129: VSS, Afebrile.  Pt calm and cooperative overnight.  No complaints of nausea or SOB. Pt complained of pain given oxycodone x1 managed pain well. J tube with tube feeds at 45 ml/ hr.  Pt having loose burnt orange colored stools.  Pt reported color to be baseline.  Pt tolerating NPO with ice chips/  Pt otherwise comfortable, continue with POC.

## 2017-05-08 NOTE — PROGRESS NOTES
CLINICAL NUTRITION SERVICES - BRIEF NOTE    - GI: pt reports diarrhea no better or worse than before switching to latest TF formula (Peptamen 1.5, a semi-elemental and fiber free formula) but watery, persistent diarrhea continues.  Interested in trying Nutrisource fiber packets (source of soluble fiber) to see if this helps bulk stool    - TF: Peptamen 1.5 @ 45 mL/hr is goal, however pt reports TF rate now at 25 mL/hr due to intolerance, will try to work back up to goal    INTERVENTIONS  Implementation  Medical food supplement therapy: add 1 pkt Nutrisource fiber TID (additional 15 kcal and 3 g fiber each) to help bulk stool    Monitoring/Evaluation  Progress toward goals will be monitored and evaluated per protocol.     Evie Montanez, RD, LD   7B RD Pager: [phone]

## 2017-05-08 NOTE — PROGRESS NOTES
05/08/17 1449   General Information   Onset Date 05/03/17   Start of Care Date 05/08/17   Referring Physician Viki Carmen MD   Patient Profile Review/OT: Additional Occupational Profile Info See Profile for full history and prior level of function   Patient/Family Goals Statement Pt wants to be able to have something to drink   Swallowing Evaluation Bedside swallow evaluation   Behaviorial Observations WFL (within functional limits)  (Pt pleasant and cooperative)   Mode of current nutrition NPO   Comments Pt with a history of a toupet fundoplication complicated by a leak requiring multiple subsequent procedures, most recently status post a gastric pull through, resection of manubrium, spit fistula takedown, J-tube, and phargynostomy tube placement on 4/17, no readmitted on 5/3 for pneumonia. Pt seen for clinical swallow evaluation without evidence for aspiration. VFSS orders received to rule out silent aspiration   Clinical Swallow Evaluation   Oral Musculature generally intact   Structural Abnormalities none present   Dentition present and adequate   Mucosal Quality good   Mandibular Strength and Mobility intact   Oral Labial Strength and Mobility WFL   Lingual Strength and Mobility WFL   Velar Elevation intact   Buccal Strength and Mobility intact   Laryngeal Function Cough;Throat clear;Voicing initiated;Swallow   VFSS Evaluation   VFSS Additional Documentation Yes   VFSS Eval: Radiology   Radiologist resident   Views Taken left lateral   Physical Location of Procedure [facility] radiology dept rm 4   VFSS Eval: Thin Liquid Texture Trial   Mode of Presentation, Thin Liquid cup;straw;self-fed   Order of Presentation 1, 2, 3, 4, 5   Preparatory Phase WFL   Oral Phase, Thin Liquid WFL   Pharyngeal Phase, Thin Liquid WFL   Rosenbek's Penetration Aspiration Scale: Thin Liquid Trial Results 1 - no aspiration, contrast does not enter airway   Diagnostic Statement No penetration/aspiration observed. improved epiglottic  movement from prior VFSS (4/24/17). swallow functional for thin liquids   Esophageal Phase of Swallow   Esophageal comments see MD report for esophageal anatomical changes   Swallow Eval: Clinical Impressions   Skilled Criteria for Therapy Intervention No problems identified which require skilled intervention   Dysphagia Outcome Severity Scale (ZACK) Level 6 - ZACK   Treatment Diagnosis functional oral-pharyngeal swallow   Diet texture recommendations Clear liquid  (with advance as appropriate per medical team)   Recommended Feeding/Eating Techniques maintain upright posture during/after eating for 30 mins;small sips/bites   Predicted Duration of Therapy Intervention (days/wks) evaluation only   Anticipated Discharge Disposition home   Risks and Benefits of Treatment have been explained. Yes   Patient, family and/or staff in agreement with Plan of Care Yes   Clinical Impression Comments Video swallow evaluation completed per MD order. Water soluble contrast used per MD. Pt demonstrated functional oral-pharyngeal swallow for thin liquids on exam. swallow response timely and improved epiglottoc movement noted compared to last VFSS (4/24/17). No penetration or aspiration observed on exam. Ok for clear liquid diet from oral-pharyngeal perspective. Will defer to medical team for diet advance as appropriate from surgical perspective. Do not anticipate oral-pharyngeal difficulties with diet advance. No ongoing ST needs indicated at this time. Pt to continue pharyngeal strengthening exercises independently. will sign off.   Total Evaluation Time   Total Evaluation Time (Minutes) 13

## 2017-05-08 NOTE — PLAN OF CARE
Problem: Goal Outcome Summary  Goal: Goal Outcome Summary  Outcome: No Change  3:30pm- 7:30p  Afeb, VSS, pain at tolerable level with prn pain meds & denies N/V. Having watery stools & commode by bedside to use. TF at 45 ml/hr & maintenance per order. Denies SOB. Lung sounds diminished. Continue POC.

## 2017-05-09 ENCOUNTER — ANTICOAGULATION THERAPY VISIT (OUTPATIENT)
Dept: ANTICOAGULATION | Facility: CLINIC | Age: 72
End: 2017-05-09

## 2017-05-09 VITALS
TEMPERATURE: 97.9 F | RESPIRATION RATE: 16 BRPM | HEART RATE: 76 BPM | OXYGEN SATURATION: 97 % | WEIGHT: 105.7 LBS | SYSTOLIC BLOOD PRESSURE: 134 MMHG | HEIGHT: 60 IN | DIASTOLIC BLOOD PRESSURE: 64 MMHG | BODY MASS INDEX: 20.75 KG/M2

## 2017-05-09 DIAGNOSIS — Z79.01 LONG-TERM (CURRENT) USE OF ANTICOAGULANTS: ICD-10-CM

## 2017-05-09 DIAGNOSIS — I48.91 ATRIAL FIBRILLATION, UNSPECIFIED TYPE (H): ICD-10-CM

## 2017-05-09 LAB
ANION GAP SERPL CALCULATED.3IONS-SCNC: 8 MMOL/L (ref 3–14)
BUN SERPL-MCNC: 4 MG/DL (ref 7–30)
CALCIUM SERPL-MCNC: 8.2 MG/DL (ref 8.5–10.1)
CHLORIDE SERPL-SCNC: 102 MMOL/L (ref 94–109)
CO2 SERPL-SCNC: 26 MMOL/L (ref 20–32)
CREAT SERPL-MCNC: 0.43 MG/DL (ref 0.52–1.04)
ERYTHROCYTE [DISTWIDTH] IN BLOOD BY AUTOMATED COUNT: 17.1 % (ref 10–15)
GFR SERPL CREATININE-BSD FRML MDRD: ABNORMAL ML/MIN/1.7M2
GLUCOSE SERPL-MCNC: 93 MG/DL (ref 70–99)
HCT VFR BLD AUTO: 31.3 % (ref 35–47)
HGB BLD-MCNC: 9.8 G/DL (ref 11.7–15.7)
INR PPP: 2.14 (ref 0.86–1.14)
MCH RBC QN AUTO: 27 PG (ref 26.5–33)
MCHC RBC AUTO-ENTMCNC: 31.3 G/DL (ref 31.5–36.5)
MCV RBC AUTO: 86 FL (ref 78–100)
PLATELET # BLD AUTO: 771 10E9/L (ref 150–450)
POTASSIUM SERPL-SCNC: 3.8 MMOL/L (ref 3.4–5.3)
RBC # BLD AUTO: 3.63 10E12/L (ref 3.8–5.2)
SODIUM SERPL-SCNC: 136 MMOL/L (ref 133–144)
WBC # BLD AUTO: 13.5 10E9/L (ref 4–11)

## 2017-05-09 PROCEDURE — 25000132 ZZH RX MED GY IP 250 OP 250 PS 637: Mod: GY | Performed by: SURGERY

## 2017-05-09 PROCEDURE — 80048 BASIC METABOLIC PNL TOTAL CA: CPT | Performed by: THORACIC SURGERY (CARDIOTHORACIC VASCULAR SURGERY)

## 2017-05-09 PROCEDURE — A9270 NON-COVERED ITEM OR SERVICE: HCPCS | Mod: GY | Performed by: THORACIC SURGERY (CARDIOTHORACIC VASCULAR SURGERY)

## 2017-05-09 PROCEDURE — 85610 PROTHROMBIN TIME: CPT | Performed by: THORACIC SURGERY (CARDIOTHORACIC VASCULAR SURGERY)

## 2017-05-09 PROCEDURE — A9270 NON-COVERED ITEM OR SERVICE: HCPCS | Mod: GY | Performed by: SURGERY

## 2017-05-09 PROCEDURE — 25000132 ZZH RX MED GY IP 250 OP 250 PS 637: Mod: GY | Performed by: STUDENT IN AN ORGANIZED HEALTH CARE EDUCATION/TRAINING PROGRAM

## 2017-05-09 PROCEDURE — 36592 COLLECT BLOOD FROM PICC: CPT | Performed by: THORACIC SURGERY (CARDIOTHORACIC VASCULAR SURGERY)

## 2017-05-09 PROCEDURE — 27210437 ZZH NUTRITION PRODUCT SEMIELEM INTERMED LITER

## 2017-05-09 PROCEDURE — 40000802 ZZH SITE CHECK

## 2017-05-09 PROCEDURE — 85027 COMPLETE CBC AUTOMATED: CPT | Performed by: THORACIC SURGERY (CARDIOTHORACIC VASCULAR SURGERY)

## 2017-05-09 PROCEDURE — 25000132 ZZH RX MED GY IP 250 OP 250 PS 637: Mod: GY | Performed by: THORACIC SURGERY (CARDIOTHORACIC VASCULAR SURGERY)

## 2017-05-09 PROCEDURE — A9270 NON-COVERED ITEM OR SERVICE: HCPCS | Mod: GY | Performed by: STUDENT IN AN ORGANIZED HEALTH CARE EDUCATION/TRAINING PROGRAM

## 2017-05-09 PROCEDURE — 25000128 H RX IP 250 OP 636: Performed by: STUDENT IN AN ORGANIZED HEALTH CARE EDUCATION/TRAINING PROGRAM

## 2017-05-09 RX ORDER — OXYCODONE HCL 5 MG/5 ML
10-15 SOLUTION, ORAL ORAL
Qty: 700 ML | Refills: 0 | Status: SHIPPED | OUTPATIENT
Start: 2017-05-09 | End: 2017-05-15 | Stop reason: DRUGHIGH

## 2017-05-09 RX ORDER — LEVOFLOXACIN 500 MG/1
500 TABLET, FILM COATED ORAL DAILY
Qty: 7 TABLET | Refills: 0 | Status: SHIPPED | OUTPATIENT
Start: 2017-05-09 | End: 2017-05-16

## 2017-05-09 RX ORDER — MORPHINE 10 MG/ML
0.6 TINCTURE ORAL EVERY 6 HOURS
Qty: 118 ML | Refills: 0 | Status: ON HOLD | OUTPATIENT
Start: 2017-05-09 | End: 2017-07-06

## 2017-05-09 RX ORDER — DIPHENOXYLATE HCL/ATROPINE 2.5-.025/5
5 LIQUID (ML) ORAL 4 TIMES DAILY PRN
Qty: 300 ML | Refills: 0 | Status: SHIPPED | OUTPATIENT
Start: 2017-05-09 | End: 2017-07-26

## 2017-05-09 RX ORDER — GUAR GUM
1 PACKET (EA) ORAL
Qty: 60 PACKET | Refills: 0 | COMMUNITY
Start: 2017-05-09 | End: 2018-01-31 | Stop reason: ALTCHOICE

## 2017-05-09 RX ORDER — WARFARIN SODIUM 2.5 MG/1
2.5 TABLET ORAL
Status: DISCONTINUED | OUTPATIENT
Start: 2017-05-09 | End: 2017-05-09 | Stop reason: HOSPADM

## 2017-05-09 RX ADMIN — OXYCODONE HYDROCHLORIDE 15 MG: 5 SOLUTION ORAL at 03:43

## 2017-05-09 RX ADMIN — ACETAMINOPHEN 975 MG: 160 SUSPENSION ORAL at 12:07

## 2017-05-09 RX ADMIN — METHOCARBAMOL 500 MG: 500 TABLET ORAL at 09:47

## 2017-05-09 RX ADMIN — HYOSCYAMINE SULFATE 0.12 MG: 0.12 SOLUTION/ DROPS ORAL at 09:49

## 2017-05-09 RX ADMIN — Medication 5 ML: at 14:39

## 2017-05-09 RX ADMIN — Medication 6 MG: at 10:13

## 2017-05-09 RX ADMIN — GABAPENTIN 300 MG: 250 SOLUTION ORAL at 12:07

## 2017-05-09 RX ADMIN — OXYCODONE HYDROCHLORIDE 10 MG: 5 SOLUTION ORAL at 10:15

## 2017-05-09 RX ADMIN — POTASSIUM CHLORIDE 20 MEQ: 1.5 POWDER, FOR SOLUTION ORAL at 09:50

## 2017-05-09 RX ADMIN — OXYCODONE HYDROCHLORIDE 15 MG: 5 SOLUTION ORAL at 06:52

## 2017-05-09 RX ADMIN — Medication 1 PACKET: at 10:13

## 2017-05-09 RX ADMIN — METHOCARBAMOL 500 MG: 500 TABLET ORAL at 14:39

## 2017-05-09 RX ADMIN — Medication 1 PACKET: at 14:41

## 2017-05-09 RX ADMIN — MENTHOL 1 PATCH: 205.5 PATCH TOPICAL at 00:47

## 2017-05-09 RX ADMIN — OXYCODONE HYDROCHLORIDE 15 MG: 5 SOLUTION ORAL at 14:39

## 2017-05-09 RX ADMIN — MULTIVITAMIN 15 ML: LIQUID ORAL at 09:49

## 2017-05-09 RX ADMIN — METOPROLOL TARTRATE 25 MG: 100 TABLET, FILM COATED ORAL at 09:45

## 2017-05-09 RX ADMIN — LIDOCAINE 1 PATCH: 50 PATCH CUTANEOUS at 09:50

## 2017-05-09 RX ADMIN — LEVOFLOXACIN 750 MG: 5 INJECTION, SOLUTION INTRAVENOUS at 12:07

## 2017-05-09 RX ADMIN — MINERAL SUPPLEMENT IRON 300 MG / 5 ML STRENGTH LIQUID 100 PER BOX UNFLAVORED 300 MG: at 09:48

## 2017-05-09 RX ADMIN — Medication 6 MG: at 14:38

## 2017-05-09 RX ADMIN — HYOSCYAMINE SULFATE 0.12 MG: 0.12 SOLUTION/ DROPS ORAL at 14:38

## 2017-05-09 RX ADMIN — Medication 5 ML: at 03:43

## 2017-05-09 RX ADMIN — Medication 5 ML: at 10:13

## 2017-05-09 RX ADMIN — OXYCODONE HYDROCHLORIDE 15 MG: 5 SOLUTION ORAL at 00:46

## 2017-05-09 NOTE — PLAN OF CARE
Problem: Goal Outcome Summary  Goal: Goal Outcome Summary  Outcome: Improving  MD in to see patient. Instructions given for care and follow up for chest skin infection. PICC line removed catheter intact. All belongings left with patient. Transport took patient to front door.

## 2017-05-09 NOTE — MR AVS SNAPSHOT
Minerva BARBARA Blanco   5/9/2017   Anticoagulation Therapy Visit    Description:  72 year old female   Provider:  Tyesha Marie, RN   Department:  [facility]           INR as of 5/9/2017     Today's INR       Anticoagulation Summary as of 5/9/2017     INR goal 2.0-3.0   Today's INR    Next INR check     Indications   Long-term (current) use of anticoagulants [Z79.01] [Z79.01]  Atrial fibrillation (H) [I48.91] [I48.91]         May 2017 Details    Sun Mon Tue Wed Thu Fri Sat      1      3.75 mg   See details      2      3.75 mg         3            4               5               6                 7               8               9               10               11               12               13                 14               15               16               17               18               19               20                 21               22               23               24               25               26               27                 28               29               30               31                   Date Details   05/01 This INR check       Date of next INR:  5/3/2017         How to take your warfarin dose     To take:  1.25 mg Take 0.5 of a 2.5 mg tablet.    To take:  3.75 mg Take 1.5 of the 2.5 mg tablets.

## 2017-05-09 NOTE — PROGRESS NOTES
MATHTEW Emery from  [facility] calling asking when they should visit pt to get an INR. Pt is getting D/C today and writer reports it would make more sense to get an INR Thursday or Friday. Since home care is going on Wednesday to deliver tube feeding equipment Yuly will visit pt on Friday 5/12

## 2017-05-09 NOTE — DISCHARGE SUMMARY
NAME: Minerva Blanco   MRN: [id number]   : 1945     DATE OF ADMISSION: 5/3/2017     ADMISSION DIAGNOSES: Pneumonia    ADDITIONAL DISCHARGE DIAGNOSES:   1. Severe malnutrition in the context of acute on chronic illness   2. Anemia of chronic disease    INTERVENTIONS PERFORMED: Broad spectrum antibiotics    DATE OF DISCHARGE:  2017     HOSPITAL COURSE: Minerva Blanco is a 72 year old female who on 5/3/2017 was admitted from clinic where a CT scan completed for postop fever showed a new pneumonia.  She was started on broad spectrum antibiotics, which were tolerated well and had good clinical response.  The remainder of her course was essentially uncomplicated.  Prior to discharge, her pain was controlled well, she was able to perform ADLs and ambulate independently without difficulty, and had full return of bowel and bladder function.  On 2017, she was discharged to home in stable condition.    DISCHARGE EXAM:   A&o, AF/VSS  Resp non-labored  Distal extremities warm    Incisions healing well     DISCHARGE INSTRUCTIONS:    Discharge Procedure Orders  XR Chest 2 Views   Standing Status: Future  Standing Exp. Date: 17     CBC with platelets   Standing Status: Future  Standing Exp. Date: 17   Order Comments: Last Lab Result: Hemoglobin (g/dL)      Date                     Value                2017               9.8 (L)          ----------     Basic metabolic panel   Standing Status: Future  Standing Exp. Date: 17     Albumin level   Standing Status: Future  Standing Exp. Date: 17     Prealbumin   Standing Status: Future  Standing Exp. Date: 18     Home care nursing referral   Referral Type: Home Health Therapies & Aides     Home infusion referral     Follow Up and recommended labs and tests   Order Comments: 1.) Follow up with Jens Wise MD in Thoracic Surgery clinic in 2 weeks, prior to which a CXR, BMP, nutrition labs, and CBC should be performed.   2.)  Follow up with the pain clinic within one week for further management of acute on chronic pain.     Discharge Instructions   Order Comments: DIET: Full liquid diet.  Soft mechanical in 5-7 days if doing well.  With the soft diet chew very well and take 4-5 small meals per day, or more if necessary. Do not take food by mouth after 6pm to minimize reflux.    You will continue tube feeds until follow up, so food by mouth is primarily for pleasure.  Transition to each new diet stage and introduce new foods slowly to  how well you tolerate them.    Follow instructions provided by the dietician and the speech therapist    Sleep with the head of your bed elevated to help prevent reflux, which is common after esophagectomy    If your travel plans upon discharge include prolonged periods of sitting (a lengthy car or plane ride), it is highly beneficial to get up and walk at least once per hour to help prevent swelling and blood clots.     You may get incision wet 2 days after operation. Do not submerge, soak, or scrub incision or swim until seen in follow-up.    Take incentive spirometer home for continued frequent use    Activity as tolerated, no strenous activity until seen in follow-up, no lifting greater than 10 pounds for the next 2-4 weeks.    Stay hydrated. Take over the counter fiber (metamucil or benefiber) and stool softeners (Miralax, docusate or senna) if becoming constipated.     Call for fever greater than 101.5, chills, increased size of incision, red skin around incision, vision changes, muscle strength changes, sensation changes, shortness of breath, or other concerns.    No driving while taking narcotic pain medication.    Transition to ibuprofen or tylenol/acetaminophen for pain control. Do not take tylenol/acetaminophen and acetaminophen containing narcotic (e.g., percocet or vicodin) at the same time. If you have known ulcer problems, or kidney trouble (elevated creatinine) do not take the  ibuprofen.    In emergencies, call 911    For other Questions or Concerns;  A.) During weekday working hours (Monday through Friday 8am to 4:30pm) call [phone] and ask to speak to a clinical nurse specialist.    B.) At nights (after 4:30pm), on weekends, or if urgent call [phone] and tell the   I would like to page job code [phone], the thoracic surgery fellow on call, please.          DISCHARGE MEDICATIONS:    Minerva Blanco   Home Medication Instructions PINO:[phone]    Printed on:05/09/17 1134   Medication Information                      acetaminophen (TYLENOL) 32 mg/mL solution  Take 30.45 mLs (975 mg) by mouth every 8 hours for 15 days             cholestyramine (QUESTRAN) 4 G Packet  Take 1 packet by mouth daily             DiphenhydrAMINE HCl (BENADRYL PO)  Take 25 mg by mouth nightly as needed             diphenoxylate-atropine (LOMOTIL) 0.5-0.005 mg/mL liquid  Take 5 mLs by mouth 4 times daily as needed for diarrhea             ferrous sulfate 300 (60 FE) MG/5ML syrup  5 mLs (300 mg) by Per J Tube route daily             fiber modular (NUTRISOURCE FIBER) packet  1 packet by Per Feeding Tube route 3 times daily (with meals)             gabapentin (NEURONTIN) 250 MG/5ML solution  Take 6 mLs (300 mg) by mouth every 8 hours             Lactobacillus Rhamnosus, GG, (CULTURELLE PO)  Take 1 tablet by mouth 2 times daily              levofloxacin (LEVAQUIN) 500 MG tablet  Take 1 tablet (500 mg) by mouth daily for 7 days             loperamide (IMODIUM) 1 MG/5ML liquid  Take 20 mLs (4 mg) by mouth 4 times daily as needed for diarrhea             metoprolol (LOPRESSOR) 10 mg/mL SUSP  2.5 mLs (25 mg) by Per J Tube route 2 times daily             multivitamins with minerals (CERTAVITE/CEROVITE) LIQD liquid  Take 15 mLs by mouth daily             opium tincture 10 mg/mL (1%) liquid  Take 0.6 mLs (6 mg) by mouth every 6 hours             order for DME  Equipment being ordered:   [facility]  Suction Machine-Intermittent  Suction Canisters(2)  Suction Canister Holders(2)  Suction Connect Tube(2)  5 in 1 Connector(2)  Bacteria Filter(2)  Yaunkauer Suction(2)    Treatment Diagnosis: Esophogeal Perforation, s/p esophagectomy             order for DME  Equipment being ordered:   Suction Pump  Suction Canister(2)  Suction Tubing(2)  Bacteria Filter(2)  Yaunkauer(4)  Red Rubber Catheter(2)    Treatment Diagnosis: Esophogeal Perforation, s/p esophagectomy             oxyCODONE (ROXICODONE) 5 MG/5ML solution  10-15 mLs (10-15 mg) by Oral or Feeding Tube route every 3 hours as needed for moderate to severe pain             ranitidine (ZANTAC) 150 MG/10ML syrup  Take 10 mLs (150 mg) by mouth 2 times daily             simethicone (MYLICON) 40 MG/0.6ML suspension  0.6 mLs (40 mg) by Per Feeding Tube route every 6 hours as needed for cramping             traZODone (DESYREL) 100 MG tablet  0.5 tablets (50 mg) by Per G Tube route At Bedtime             warfarin (COUMADIN) 2.5 MG tablet  Take 1 tablet (2.5 mg) by mouth daily

## 2017-05-09 NOTE — PROGRESS NOTES
Care Coordinator- Discharge Planning     Admission Date/Time:  5/3/2017  Attending MD:  Jens Wise*     Data  Date of initial CC assessment:  5/4/2017  Chart reviewed, discussed with interdisciplinary team.   Patient was admitted for:   1. Esophageal perforation    2. Long-term (current) use of anticoagulants [Z79.01]    3. Hx of esophagectomy    4. Bowel habit changes    5. History of esophagectomy    6. Atrial fibrillation, unspecified type (H)    7. Anemia due to other cause    8. HCAP (healthcare-associated pneumonia)    9. Acute post-operative pain         Assessment  Full assessment completed in previous note    Coordination of Care and Referrals: Provided patient/family with options for Pain Clinic.    Per MD team patient medically ready for discharge to home.  [facility] made aware[facility]  [facility] will deliver tube feed formula this afternoon.  Patient needs appointment set up to f/u with the [facility] pain clinic.  Patient had an appointment for 5/4 but was admitted at this time.  Made appointment for 5/10 at 12:10pm.  Patient made aware.         Plan  Anticipated Discharge Date: 5/9/2017  Anticipated Discharge Plan:  Home with resumption of services and plan for pain clinic f/u    CTS Handoff completed:  YES    Sarina Orellana, RNCC  [phone]

## 2017-05-09 NOTE — PLAN OF CARE
Problem: Goal Outcome Summary  Goal: Goal Outcome Summary  Outcome: Improving  VSS, afebrile. Pain controlled. Up independently in room. Continues to have loose watery stool with tubefeeding and IV antibiotics. Will manage at home with antidiarrheals and expect improvement with completion of antibiotics. Discharge orders reviewed with patient and spouse. Pt verbalized understanding. Dressing to chest wounds done. Purulent drainage noted from base of old chest incision. MD notified. Will come to bedside to see patient before discharge. Will continue with POC.

## 2017-05-09 NOTE — PLAN OF CARE
Problem: Goal Outcome Summary  Goal: Goal Outcome Summary  Outcome: No Change  Vitals:     05/08/17 1555 05/08/17 2017 05/08/17 2346 05/09/17 0312   BP: 143/69 151/73 140/68 134/63   BP Location: Right arm Right arm Right arm Right arm   Pulse: 76         Resp: 16 16 16 16   Temp: 96.4  F (35.8  C) 96.3  F (35.7  C) 96.1  F (35.6  C) 96.4  F (35.8  C)   TempSrc: Oral Oral Oral Oral   SpO2: 99% 99% 98% 98%   Weight:           Height:             Per 12 hours, patient afebrile. VSS. O2 sats high 90s on RA. Lung sounds diminished and fine crackles. Patient nauseated, notified Dr. Garcia who ordered PRN Zofran, administered, patient decreased TF rate at 15 mL/hr and currently running at this rate. Denied further nausea. On full liquid diet, taking sips. Up to commode independently and voiding adequate amount. Patient had one loose stool at midnight. PRN Lomotil x1 and scheduled fiber administered. Patient c/o increased abdominal pain, administered PRN oxycodone x3, gabapentin, Tylenol, robaxin for good pain control, patient slept in between cares. Slept in between cares. Continue POC.

## 2017-05-10 ENCOUNTER — CARE COORDINATION (OUTPATIENT)
Dept: CARE COORDINATION | Facility: CLINIC | Age: 72
End: 2017-05-10

## 2017-05-10 ENCOUNTER — ANTICOAGULATION THERAPY VISIT (OUTPATIENT)
Dept: ANTICOAGULATION | Facility: CLINIC | Age: 72
End: 2017-05-10

## 2017-05-10 DIAGNOSIS — I48.91 ATRIAL FIBRILLATION, UNSPECIFIED TYPE (H): ICD-10-CM

## 2017-05-10 DIAGNOSIS — Z79.01 LONG-TERM (CURRENT) USE OF ANTICOAGULANTS: ICD-10-CM

## 2017-05-10 NOTE — PROGRESS NOTES
Patient has clinic visit within 24-48 hours of Discharge so no post DC follow up call is needed        May 10, 2017 12:10 PM CDT   (Arrive by 11:55 AM)   New Patient Visit with Mike Enamorado DO   [facility] ([facility])     [address] [place] [address]  [facility] [id number] [phone]

## 2017-05-10 NOTE — MR AVS SNAPSHOT
Minerva JIMENEZ Francis   5/10/2017   Anticoagulation Therapy Visit    Description:  72 year old female   Provider:  Gloria Richardson RN   Department:  [facility]           INR as of 5/10/2017     Today's INR       Anticoagulation Summary as of 5/10/2017     INR goal 2.0-3.0   Today's INR    Full instructions 5/10: 2.5 mg; Otherwise 1.25 mg on Wed; 2.5 mg all other days   Next INR check 5/12/2017    Indications   Long-term (current) use of anticoagulants [Z79.01] [Z79.01]  Atrial fibrillation (H) [I48.91] [I48.91]         May 2017 Details    Sun Mon Tue Wed Thu Fri Sat      1               2               3               4               5               6                 7               8               9               10      2.5 mg   See details      11      2.5 mg         12            13                 14               15               16               17               18               19               20                 21               22               23               24               25               26               27                 28               29               30               31                   Date Details   05/10 This INR check       Date of next INR:  5/12/2017         How to take your warfarin dose     To take:  2.5 mg Take 1 of the 2.5 mg tablets.

## 2017-05-10 NOTE — PROGRESS NOTES
Dates of hospitalization: 5/3 to 5/9  Reason for hospitalization: pneumonia.   Procedures performed: IV antibiotics.  Vitamin K or FFP administered? no  Inpatient warfarin doses added to calendar? yes  Medication changes at discharge: Start Lomotil, Ferrous sulfate syrup, Levaquin, Lopressor.  D/C:  Lovenox, hyoscyamine, Lopressor  Warfarin dosing after DC: 2.5mg  Patient discharged on Lovenox? no  Next INR date: 5/12  Where is the patient discharging to? (home, TCU, staying locally, etc.): home  Will patient have home care? Yes-[facility] and [facility]

## 2017-05-11 ENCOUNTER — PRE VISIT (OUTPATIENT)
Dept: ANESTHESIOLOGY | Facility: CLINIC | Age: 72
End: 2017-05-11

## 2017-05-11 NOTE — TELEPHONE ENCOUNTER
1. Date/reason for appt: 5/19/17 7:20AM  New consult for pain management  2. Referring provider: Nicole BRUNSON   3. Call to patient (Yes / No - short description): No, pt was referred.   4. Previous care at / records requested from:  [facility] ED - 4/29/17 & 1/9/17   [facility] - 5/3/17  Referral - 2/1/17

## 2017-05-12 ENCOUNTER — ANTICOAGULATION THERAPY VISIT (OUTPATIENT)
Dept: ANTICOAGULATION | Facility: CLINIC | Age: 72
End: 2017-05-12

## 2017-05-12 DIAGNOSIS — Z79.01 LONG-TERM (CURRENT) USE OF ANTICOAGULANTS: ICD-10-CM

## 2017-05-12 DIAGNOSIS — I48.91 ATRIAL FIBRILLATION, UNSPECIFIED TYPE (H): ICD-10-CM

## 2017-05-12 LAB — INR PPP: 4.2

## 2017-05-12 NOTE — PROGRESS NOTES
ANTICOAGULATION FOLLOW-UP CLINIC VISIT    Patient Name:  Minerva Blanco  Date:  5/12/2017  Contact Type:  Telephone    SUBJECTIVE:     Patient Findings     Comments On Levaquin for pneumonia. Is having significant diarrhea.           OBJECTIVE    INR   Date Value Ref Range Status   05/12/2017 4.2  Final       ASSESSMENT / PLAN  INR assessment SUPRA    Recheck INR In: 3 DAYS    INR Location [facility]      Anticoagulation Summary as of 5/12/2017     INR goal 2.0-3.0   Today's INR 4.2!   Maintenance plan 1.25 mg (2.5 mg x 0.5) on Wed; 2.5 mg (2.5 mg x 1) all other days   Full instructions 5/12: Hold; 5/13: Hold; 5/14: 1 mg; Otherwise 1.25 mg on Wed; 2.5 mg all other days   Weekly total 16.25 mg   Plan last modified Gloria Richardson RN (3/27/2017)   Next INR check 5/15/2017   Priority INR   Target end date     Indications   Long-term (current) use of anticoagulants [Z79.01] [Z79.01]  Atrial fibrillation (H) [I48.91] [I48.91]         Anticoagulation Episode Summary     INR check location     Preferred lab     Send INR reminders to [facility]    Comments as of 3/9: pt gets tube feedings @ HS - this results in diarrhea - ~ 4-7 episodes in 24 hr.  HIPPA Form received 3/28/17 ok to speak with  Richard       Anticoagulation Care Providers     Provider Role Specialty Phone number    Pedro Moreno MD Responsible Clinical Cardiac Electrophysiology [phone]            See the Encounter Report to view Anticoagulation Flowsheet and Dosing Calendar (Go to Encounters tab in chart review, and find the Anticoagulation Therapy Visit)    Spoke with Chelo home care nurse. Would not typically reduce dosing by this much, but in the last week Minerva had an INR of 3.8 and held 2 dose, then took 0.5 mg on the third day before her INR was back in range.   Chelo will review bleeding signs and when to go to ED.    Dulce Proyr, ROMAINE

## 2017-05-12 NOTE — MR AVS SNAPSHOT
Minerva Blanco   5/12/2017   Anticoagulation Therapy Visit    Description:  72 year old female   Provider:  Dulce Pryor, RN   Department:  [facility]           INR as of 5/12/2017     Today's INR 4.2!      Anticoagulation Summary as of 5/12/2017     INR goal 2.0-3.0   Today's INR 4.2!   Full instructions 5/12: Hold; 5/13: Hold; 5/14: 1 mg; Otherwise 1.25 mg on Wed; 2.5 mg all other days   Next INR check 5/15/2017    Indications   Long-term (current) use of anticoagulants [Z79.01] [Z79.01]  Atrial fibrillation (H) [I48.91] [I48.91]         May 2017 Details    Sun Mon Tue Wed Thu Fri Sat      1               2               3               4               5               6                 7               8               9               10               11               12      Hold   See details      13      Hold           14      1 mg         15            16               17               18               19               20                 21               22               23               24               25               26               27                 28               29               30               31                   Date Details   05/12 This INR check       Date of next INR:  5/15/2017         How to take your warfarin dose     To take:  1 mg Take 1 of the 1 mg tablets.    To take:  2.5 mg Take 1 of the 2.5 mg tablets.    Hold Do not take your warfarin dose. See the Details table to the right for additional instructions.

## 2017-05-15 ENCOUNTER — ANTICOAGULATION THERAPY VISIT (OUTPATIENT)
Dept: ANTICOAGULATION | Facility: CLINIC | Age: 72
End: 2017-05-15

## 2017-05-15 DIAGNOSIS — Z79.01 LONG-TERM (CURRENT) USE OF ANTICOAGULANTS: ICD-10-CM

## 2017-05-15 DIAGNOSIS — I48.91 ATRIAL FIBRILLATION, UNSPECIFIED TYPE (H): ICD-10-CM

## 2017-05-15 DIAGNOSIS — G89.18 ACUTE POST-OPERATIVE PAIN: ICD-10-CM

## 2017-05-15 LAB — INR PPP: 3.5

## 2017-05-15 RX ORDER — OXYCODONE HYDROCHLORIDE 10 MG/1
TABLET ORAL
Qty: 60 TABLET | Refills: 0 | Status: ON HOLD | OUTPATIENT
Start: 2017-05-15 | End: 2017-06-29

## 2017-05-15 NOTE — MR AVS SNAPSHOT
Minerva JIMENEZ Francis   5/15/2017   Anticoagulation Therapy Visit    Description:  72 year old female   Provider:  Tyesha Marie, RN   Department:  [facility]           INR as of 5/15/2017     Today's INR 3.50!      Anticoagulation Summary as of 5/15/2017     INR goal 2.0-3.0   Today's INR 3.50!   Full instructions 5/15: 1.25 mg; 5/17: 2.5 mg; Otherwise 1.25 mg on Wed; 2.5 mg all other days   Next INR check 5/18/2017    Indications   Long-term (current) use of anticoagulants [Z79.01] [Z79.01]  Atrial fibrillation (H) [I48.91] [I48.91]         May 2017 Details    Sun Mon Tue Wed Thu Fri Sat      1               2               3               4               5               6                 7               8               9               10               11               12               13                 14               15      1.25 mg   See details      16      2.5 mg         17      2.5 mg         18            19               20                 21               22               23               24               25               26               27                 28               29               30               31                   Date Details   05/15 This INR check       Date of next INR:  5/18/2017         How to take your warfarin dose     To take:  1.25 mg Take 0.5 of a 2.5 mg tablet.    To take:  2.5 mg Take 1 of the 2.5 mg tablets.

## 2017-05-15 NOTE — TELEPHONE ENCOUNTER
"I talked to patients' , Enrique.   He said Minerva was out of oxycodone, had an [facility] with [facility] but had \"terrible diarrhea and couldn't keep the appointment\".   She is rescheduled to see someone there this week Friday.          I told him I would give just enough to Friday, no further refills can be given without her seeing a pain specialist.   He agreed and said both he and Minerva understand.   Rx taken to [facility] discharge pharmacy where Enrique will  later today.  "

## 2017-05-15 NOTE — PROGRESS NOTES
ANTICOAGULATION FOLLOW-UP CLINIC VISIT    Patient Name:  Minerva Blanco  Date:  5/15/2017  Contact Type:  Telephone    SUBJECTIVE:     Patient Findings     Positives Antibiotic use or infection    Comments Pt has two more days of Levaquin and continues to have loose stools            OBJECTIVE    INR   Date Value Ref Range Status   05/15/2017 3.50  Final     Comment:     Home [facility]        ASSESSMENT / PLAN  INR assessment SUPRA    Recheck INR In: 3 DAYS    INR Location [facility]      Anticoagulation Summary as of 5/15/2017     INR goal 2.0-3.0   Today's INR 3.50!   Maintenance plan 1.25 mg (2.5 mg x 0.5) on Wed; 2.5 mg (2.5 mg x 1) all other days   Full instructions 5/15: 1.25 mg; 5/17: 2.5 mg; Otherwise 1.25 mg on Wed; 2.5 mg all other days   Weekly total 16.25 mg   Plan last modified Gloria Richardson RN (3/27/2017)   Next INR check 5/18/2017   Priority INR   Target end date     Indications   Long-term (current) use of anticoagulants [Z79.01] [Z79.01]  Atrial fibrillation (H) [I48.91] [I48.91]         Anticoagulation Episode Summary     INR check location     Preferred lab     Send INR reminders to [facility]    Comments as of 3/9: pt gets tube feedings @ HS - this results in diarrhea - ~ 4-7 episodes in 24 hr.  HIPPA Form received 3/28/17 ok to speak with  Richard       Anticoagulation Care Providers     Provider Role Specialty Phone number    Pedro Moreno MD Responsible Clinical Cardiac Electrophysiology [phone]            See the Encounter Report to view Anticoagulation Flowsheet and Dosing Calendar (Go to Encounters tab in chart review, and find the Anticoagulation Therapy Visit)    Spoke with  Home Care nurse Chelo. Gave them new warfarin recommendation.  No changes in health, medication, or diet. No missed doses, no falls. No signs or symptoms of bleed or clotting.     Tyesha Marie RN

## 2017-05-16 ENCOUNTER — TELEPHONE (OUTPATIENT)
Dept: SURGERY | Facility: CLINIC | Age: 72
End: 2017-05-16

## 2017-05-16 NOTE — TELEPHONE ENCOUNTER
"Call from Chelo  Home Care RN regarding Minerva's neck incision. The bottom portion was \"open a little\" last week however, today that part of the incision is red, warm, painful and has a milky white discharge. Minerva cannot find her thermometer so she is unsure if she has been running fevers. She is still currently on Levaquin. The nurse was out to see Minerva yesterday and the wound was not red, warm or draining.    Chelo will have the nurse go out tomorrow to assess the wound and let me know if more drainage can be milked from the wound and to assess depth, openings, tunnels, etc of the area. She will have the nurse page me after she has assessed the wound.  "

## 2017-05-17 ENCOUNTER — ANTICOAGULATION THERAPY VISIT (OUTPATIENT)
Dept: ANTICOAGULATION | Facility: CLINIC | Age: 72
End: 2017-05-17

## 2017-05-17 DIAGNOSIS — Z79.01 LONG-TERM (CURRENT) USE OF ANTICOAGULANTS: ICD-10-CM

## 2017-05-17 DIAGNOSIS — I48.91 ATRIAL FIBRILLATION, UNSPECIFIED TYPE (H): ICD-10-CM

## 2017-05-17 DIAGNOSIS — K22.3 ESOPHAGEAL PERFORATION: Primary | ICD-10-CM

## 2017-05-17 LAB — INR PPP: 5.1

## 2017-05-17 NOTE — MR AVS SNAPSHOT
Minerva JIMENEZ Francis   5/17/2017   Anticoagulation Therapy Visit    Description:  72 year old female   Provider:  Dulce Pryor, RN   Department:  [facility]           INR as of 5/17/2017     Today's INR 5.1!      Anticoagulation Summary as of 5/17/2017     INR goal 2.0-3.0   Today's INR 5.1!   Full instructions 5/17: Hold; 5/18: Hold   Next INR check 5/19/2017    Indications   Long-term (current) use of anticoagulants [Z79.01] [Z79.01]  Atrial fibrillation (H) [I48.91] [I48.91]         May 2017 Details    Sun Mon Tue Wed Thu Fri Sat      1               2               3               4               5               6                 7               8               9               10               11               12               13                 14               15               16               17      Hold   See details      18      Hold         19            20                 21               22               23               24               25               26               27                 28               29               30               31                   Date Details   05/17 This INR check       Date of next INR:  5/19/2017         How to take your warfarin dose     Hold Do not take your warfarin dose. See the Details table to the right for additional instructions.

## 2017-05-17 NOTE — PROGRESS NOTES
ANTICOAGULATION FOLLOW-UP CLINIC VISIT    Patient Name:  Minerva Blacno  Date:  5/17/2017  Contact Type:  Telephone    SUBJECTIVE:     Patient Findings     Positives Antibiotic use or infection    Comments Finished her course of Levaquin. Home care nurse reports that her surgical site (chest) appears infected with redness, warmth, and purulent drainage. She is calling the surgeon. Continues to have diarrhea from tube feeding, as well.            OBJECTIVE    INR   Date Value Ref Range Status   05/17/2017 5.1  Final       ASSESSMENT / PLAN  INR assessment SUPRA    Recheck INR In: 2 DAYS    INR Location [facility]      Anticoagulation Summary as of 5/17/2017     INR goal 2.0-3.0   Today's INR 5.1!   Maintenance plan No maintenance plan   Full instructions 5/17: Hold; 5/18: Hold   Plan last modified Dulce Pryor RN (5/17/2017)   Next INR check 5/19/2017   Priority INR   Target end date     Indications   Long-term (current) use of anticoagulants [Z79.01] [Z79.01]  Atrial fibrillation (H) [I48.91] [I48.91]         Anticoagulation Episode Summary     INR check location     Preferred lab     Send INR reminders to [facility]    Comments as of 3/9: pt gets tube feedings @ HS - this results in diarrhea - ~ 4-7 episodes in 24 hr.  HIPPA Form received 3/28/17 ok to speak with  Richard       Anticoagulation Care Providers     Provider Role Specialty Phone number    Pedro Moreno MD Responsible Clinical Cardiac Electrophysiology [phone]            See the Encounter Report to view Anticoagulation Flowsheet and Dosing Calendar (Go to Encounters tab in chart review, and find the Anticoagulation Therapy Visit)    Spoke with home care nurse. She denies any bleeding signs. Nurse will review danger signs and when to seek emergency care.    Dulce Pryor, RN

## 2017-05-18 ENCOUNTER — HOSPITAL ENCOUNTER (INPATIENT)
Facility: CLINIC | Age: 72
LOS: 42 days | Discharge: NURSING, REHAB OR LONG-TERM CARE | DRG: 856 | End: 2017-06-29
Attending: STUDENT IN AN ORGANIZED HEALTH CARE EDUCATION/TRAINING PROGRAM | Admitting: THORACIC SURGERY (CARDIOTHORACIC VASCULAR SURGERY)
Payer: MEDICARE

## 2017-05-18 ENCOUNTER — OFFICE VISIT (OUTPATIENT)
Dept: SURGERY | Facility: CLINIC | Age: 72
DRG: 856 | End: 2017-05-18
Attending: STUDENT IN AN ORGANIZED HEALTH CARE EDUCATION/TRAINING PROGRAM
Payer: MEDICARE

## 2017-05-18 VITALS
HEIGHT: 60 IN | DIASTOLIC BLOOD PRESSURE: 65 MMHG | WEIGHT: 94.5 LBS | TEMPERATURE: 97.2 F | SYSTOLIC BLOOD PRESSURE: 96 MMHG | OXYGEN SATURATION: 95 % | BODY MASS INDEX: 18.55 KG/M2 | HEART RATE: 82 BPM

## 2017-05-18 DIAGNOSIS — L02.91 ABSCESS: Primary | ICD-10-CM

## 2017-05-18 DIAGNOSIS — Z90.49 HISTORY OF ESOPHAGECTOMY: ICD-10-CM

## 2017-05-18 DIAGNOSIS — G89.18 ACUTE POST-OPERATIVE PAIN: ICD-10-CM

## 2017-05-18 DIAGNOSIS — I48.0 PAROXYSMAL ATRIAL FIBRILLATION (H): ICD-10-CM

## 2017-05-18 DIAGNOSIS — T14.8XXA OPEN WOUND: Primary | ICD-10-CM

## 2017-05-18 DIAGNOSIS — K22.3 ESOPHAGEAL PERFORATION: ICD-10-CM

## 2017-05-18 DIAGNOSIS — Z98.890 HISTORY OF ESOPHAGECTOMY: ICD-10-CM

## 2017-05-18 PROBLEM — L02.11 NECK ABSCESS: Status: ACTIVE | Noted: 2017-05-18

## 2017-05-18 LAB
ANION GAP SERPL CALCULATED.3IONS-SCNC: 12 MMOL/L (ref 3–14)
ANISOCYTOSIS BLD QL SMEAR: SLIGHT
APTT PPP: 68 SEC (ref 22–37)
BASOPHILS # BLD AUTO: 0 10E9/L (ref 0–0.2)
BASOPHILS NFR BLD AUTO: 0 %
BUN SERPL-MCNC: 8 MG/DL (ref 7–30)
CALCIUM SERPL-MCNC: 8.9 MG/DL (ref 8.5–10.1)
CHLORIDE SERPL-SCNC: 97 MMOL/L (ref 94–109)
CO2 SERPL-SCNC: 23 MMOL/L (ref 20–32)
CREAT SERPL-MCNC: 0.45 MG/DL (ref 0.52–1.04)
DIFFERENTIAL METHOD BLD: ABNORMAL
EOSINOPHIL # BLD AUTO: 0.4 10E9/L (ref 0–0.7)
EOSINOPHIL NFR BLD AUTO: 2 %
ERYTHROCYTE [DISTWIDTH] IN BLOOD BY AUTOMATED COUNT: 16 % (ref 10–15)
GFR SERPL CREATININE-BSD FRML MDRD: ABNORMAL ML/MIN/1.7M2
GLUCOSE BLDC GLUCOMTR-MCNC: 101 MG/DL (ref 70–99)
GLUCOSE BLDC GLUCOMTR-MCNC: 66 MG/DL (ref 70–99)
GLUCOSE BLDC GLUCOMTR-MCNC: 69 MG/DL (ref 70–99)
GLUCOSE BLDC GLUCOMTR-MCNC: 77 MG/DL (ref 70–99)
GLUCOSE BLDC GLUCOMTR-MCNC: 78 MG/DL (ref 70–99)
GLUCOSE SERPL-MCNC: 64 MG/DL (ref 70–99)
GRAM STN SPEC: ABNORMAL
HCT VFR BLD AUTO: 29.3 % (ref 35–47)
HGB BLD-MCNC: 9.4 G/DL (ref 11.7–15.7)
INR PPP: 4.1 (ref 0.86–1.14)
LYMPHOCYTES # BLD AUTO: 1.7 10E9/L (ref 0.8–5.3)
LYMPHOCYTES NFR BLD AUTO: 8 %
Lab: ABNORMAL
MAGNESIUM SERPL-MCNC: 2 MG/DL (ref 1.6–2.3)
MCH RBC QN AUTO: 27.2 PG (ref 26.5–33)
MCHC RBC AUTO-ENTMCNC: 32.1 G/DL (ref 31.5–36.5)
MCV RBC AUTO: 85 FL (ref 78–100)
MICRO REPORT STATUS: ABNORMAL
MONOCYTES # BLD AUTO: 0.9 10E9/L (ref 0–1.3)
MONOCYTES NFR BLD AUTO: 4 %
NEUTROPHILS # BLD AUTO: 18.3 10E9/L (ref 1.6–8.3)
NEUTROPHILS NFR BLD AUTO: 86 %
PHOSPHATE SERPL-MCNC: 3.5 MG/DL (ref 2.5–4.5)
PLATELET # BLD AUTO: 678 10E9/L (ref 150–450)
POTASSIUM SERPL-SCNC: 3.4 MMOL/L (ref 3.4–5.3)
RBC # BLD AUTO: 3.45 10E12/L (ref 3.8–5.2)
SODIUM SERPL-SCNC: 132 MMOL/L (ref 133–144)
SPECIMEN SOURCE: ABNORMAL
WBC # BLD AUTO: 21.3 10E9/L (ref 4–11)

## 2017-05-18 PROCEDURE — 99213 OFFICE O/P EST LOW 20 MIN: CPT | Mod: ZF

## 2017-05-18 PROCEDURE — 86850 RBC ANTIBODY SCREEN: CPT | Performed by: STUDENT IN AN ORGANIZED HEALTH CARE EDUCATION/TRAINING PROGRAM

## 2017-05-18 PROCEDURE — 86901 BLOOD TYPING SEROLOGIC RH(D): CPT | Performed by: STUDENT IN AN ORGANIZED HEALTH CARE EDUCATION/TRAINING PROGRAM

## 2017-05-18 PROCEDURE — 84100 ASSAY OF PHOSPHORUS: CPT | Performed by: STUDENT IN AN ORGANIZED HEALTH CARE EDUCATION/TRAINING PROGRAM

## 2017-05-18 PROCEDURE — 25000132 ZZH RX MED GY IP 250 OP 250 PS 637: Mod: GY | Performed by: STUDENT IN AN ORGANIZED HEALTH CARE EDUCATION/TRAINING PROGRAM

## 2017-05-18 PROCEDURE — 87205 SMEAR GRAM STAIN: CPT | Performed by: CLINICAL NURSE SPECIALIST

## 2017-05-18 PROCEDURE — S5010 5% DEXTROSE AND 0.45% SALINE: HCPCS | Performed by: SURGERY

## 2017-05-18 PROCEDURE — 85730 THROMBOPLASTIN TIME PARTIAL: CPT | Performed by: STUDENT IN AN ORGANIZED HEALTH CARE EDUCATION/TRAINING PROGRAM

## 2017-05-18 PROCEDURE — 85025 COMPLETE CBC W/AUTO DIFF WBC: CPT | Performed by: STUDENT IN AN ORGANIZED HEALTH CARE EDUCATION/TRAINING PROGRAM

## 2017-05-18 PROCEDURE — 83735 ASSAY OF MAGNESIUM: CPT | Performed by: STUDENT IN AN ORGANIZED HEALTH CARE EDUCATION/TRAINING PROGRAM

## 2017-05-18 PROCEDURE — 85610 PROTHROMBIN TIME: CPT | Performed by: STUDENT IN AN ORGANIZED HEALTH CARE EDUCATION/TRAINING PROGRAM

## 2017-05-18 PROCEDURE — 86900 BLOOD TYPING SEROLOGIC ABO: CPT | Performed by: STUDENT IN AN ORGANIZED HEALTH CARE EDUCATION/TRAINING PROGRAM

## 2017-05-18 PROCEDURE — A9270 NON-COVERED ITEM OR SERVICE: HCPCS | Mod: GY | Performed by: STUDENT IN AN ORGANIZED HEALTH CARE EDUCATION/TRAINING PROGRAM

## 2017-05-18 PROCEDURE — 25000128 H RX IP 250 OP 636: Performed by: STUDENT IN AN ORGANIZED HEALTH CARE EDUCATION/TRAINING PROGRAM

## 2017-05-18 PROCEDURE — 87106 FUNGI IDENTIFICATION YEAST: CPT | Performed by: CLINICAL NURSE SPECIALIST

## 2017-05-18 PROCEDURE — 25800025 ZZH RX 258: Performed by: SURGERY

## 2017-05-18 PROCEDURE — 87070 CULTURE OTHR SPECIMN AEROBIC: CPT | Performed by: CLINICAL NURSE SPECIALIST

## 2017-05-18 PROCEDURE — 86923 COMPATIBILITY TEST ELECTRIC: CPT | Performed by: STUDENT IN AN ORGANIZED HEALTH CARE EDUCATION/TRAINING PROGRAM

## 2017-05-18 PROCEDURE — 25000128 H RX IP 250 OP 636: Performed by: SURGERY

## 2017-05-18 PROCEDURE — 80048 BASIC METABOLIC PNL TOTAL CA: CPT | Performed by: STUDENT IN AN ORGANIZED HEALTH CARE EDUCATION/TRAINING PROGRAM

## 2017-05-18 PROCEDURE — 36415 COLL VENOUS BLD VENIPUNCTURE: CPT | Performed by: STUDENT IN AN ORGANIZED HEALTH CARE EDUCATION/TRAINING PROGRAM

## 2017-05-18 PROCEDURE — 00000146 ZZHCL STATISTIC GLUCOSE BY METER IP

## 2017-05-18 PROCEDURE — 40000556 ZZH STATISTIC PERIPHERAL IV START W US GUIDANCE

## 2017-05-18 PROCEDURE — A9270 NON-COVERED ITEM OR SERVICE: HCPCS | Mod: GY | Performed by: SURGERY

## 2017-05-18 PROCEDURE — 25000132 ZZH RX MED GY IP 250 OP 250 PS 637: Mod: GY | Performed by: SURGERY

## 2017-05-18 PROCEDURE — 12000008 ZZH R&B INTERMEDIATE UMMC

## 2017-05-18 RX ORDER — FLUCONAZOLE 2 MG/ML
200 INJECTION, SOLUTION INTRAVENOUS EVERY 24 HOURS
Status: DISCONTINUED | OUTPATIENT
Start: 2017-05-19 | End: 2017-06-07

## 2017-05-18 RX ORDER — NALOXONE HYDROCHLORIDE 0.4 MG/ML
.1-.4 INJECTION, SOLUTION INTRAMUSCULAR; INTRAVENOUS; SUBCUTANEOUS
Status: DISCONTINUED | OUTPATIENT
Start: 2017-05-18 | End: 2017-06-29 | Stop reason: HOSPADM

## 2017-05-18 RX ORDER — ONDANSETRON 4 MG/1
4 TABLET, ORALLY DISINTEGRATING ORAL EVERY 6 HOURS PRN
Status: DISCONTINUED | OUTPATIENT
Start: 2017-05-18 | End: 2017-06-29 | Stop reason: HOSPADM

## 2017-05-18 RX ORDER — GUAR GUM
1 PACKET (EA) ORAL
Status: DISCONTINUED | OUTPATIENT
Start: 2017-05-18 | End: 2017-05-24

## 2017-05-18 RX ORDER — SIMETHICONE 40MG/0.6ML
40 SUSPENSION, DROPS(FINAL DOSAGE FORM)(ML) ORAL EVERY 6 HOURS PRN
Status: DISCONTINUED | OUTPATIENT
Start: 2017-05-18 | End: 2017-06-07

## 2017-05-18 RX ORDER — PIPERACILLIN SODIUM, TAZOBACTAM SODIUM 3; .375 G/15ML; G/15ML
3.38 INJECTION, POWDER, LYOPHILIZED, FOR SOLUTION INTRAVENOUS EVERY 6 HOURS
Status: DISCONTINUED | OUTPATIENT
Start: 2017-05-18 | End: 2017-06-07

## 2017-05-18 RX ORDER — GABAPENTIN 250 MG/5ML
300 SOLUTION ORAL EVERY 8 HOURS
Status: DISCONTINUED | OUTPATIENT
Start: 2017-05-18 | End: 2017-05-25

## 2017-05-18 RX ORDER — POTASSIUM CHLORIDE 750 MG/1
20-40 TABLET, EXTENDED RELEASE ORAL
Status: DISCONTINUED | OUTPATIENT
Start: 2017-05-18 | End: 2017-05-22

## 2017-05-18 RX ORDER — FLUCONAZOLE 2 MG/ML
400 INJECTION, SOLUTION INTRAVENOUS EVERY 24 HOURS
Status: DISCONTINUED | OUTPATIENT
Start: 2017-05-18 | End: 2017-05-18

## 2017-05-18 RX ORDER — OXYCODONE HCL 5 MG/5 ML
5 SOLUTION, ORAL ORAL EVERY 4 HOURS PRN
Status: DISCONTINUED | OUTPATIENT
Start: 2017-05-18 | End: 2017-05-19

## 2017-05-18 RX ORDER — MORPHINE 10 MG/ML
0.6 TINCTURE ORAL EVERY 6 HOURS
Status: DISCONTINUED | OUTPATIENT
Start: 2017-05-18 | End: 2017-05-25

## 2017-05-18 RX ORDER — POTASSIUM CHLORIDE 1.5 G/1.58G
20-40 POWDER, FOR SOLUTION ORAL
Status: DISCONTINUED | OUTPATIENT
Start: 2017-05-18 | End: 2017-05-22

## 2017-05-18 RX ORDER — DIPHENOXYLATE HCL/ATROPINE 2.5-.025/5
5 LIQUID (ML) ORAL 4 TIMES DAILY PRN
Status: DISCONTINUED | OUTPATIENT
Start: 2017-05-18 | End: 2017-05-24

## 2017-05-18 RX ORDER — POTASSIUM CL/LIDO/0.9 % NACL 10MEQ/0.1L
10 INTRAVENOUS SOLUTION, PIGGYBACK (ML) INTRAVENOUS
Status: DISCONTINUED | OUTPATIENT
Start: 2017-05-18 | End: 2017-05-22

## 2017-05-18 RX ORDER — ONDANSETRON 2 MG/ML
4 INJECTION INTRAMUSCULAR; INTRAVENOUS EVERY 6 HOURS PRN
Status: DISCONTINUED | OUTPATIENT
Start: 2017-05-18 | End: 2017-06-29 | Stop reason: HOSPADM

## 2017-05-18 RX ORDER — CHOLESTYRAMINE 4 G/9G
1 POWDER, FOR SUSPENSION ORAL DAILY
Status: DISCONTINUED | OUTPATIENT
Start: 2017-05-18 | End: 2017-05-25

## 2017-05-18 RX ORDER — MAGNESIUM SULFATE HEPTAHYDRATE 40 MG/ML
4 INJECTION, SOLUTION INTRAVENOUS EVERY 4 HOURS PRN
Status: DISCONTINUED | OUTPATIENT
Start: 2017-05-18 | End: 2017-05-22

## 2017-05-18 RX ORDER — TRAZODONE HYDROCHLORIDE 50 MG/1
50 TABLET, FILM COATED ORAL AT BEDTIME
Status: DISCONTINUED | OUTPATIENT
Start: 2017-05-18 | End: 2017-05-24

## 2017-05-18 RX ORDER — LOPERAMIDE HYDROCHLORIDE 1 MG/5ML
4 SOLUTION ORAL 2 TIMES DAILY
Status: DISCONTINUED | OUTPATIENT
Start: 2017-05-18 | End: 2017-05-24

## 2017-05-18 RX ORDER — POTASSIUM CHLORIDE 7.45 MG/ML
10 INJECTION INTRAVENOUS
Status: DISCONTINUED | OUTPATIENT
Start: 2017-05-18 | End: 2017-05-22

## 2017-05-18 RX ORDER — POTASSIUM CHLORIDE 29.8 MG/ML
20 INJECTION INTRAVENOUS
Status: DISCONTINUED | OUTPATIENT
Start: 2017-05-18 | End: 2017-05-22

## 2017-05-18 RX ORDER — HYDROMORPHONE HYDROCHLORIDE 1 MG/ML
.3-.5 INJECTION, SOLUTION INTRAMUSCULAR; INTRAVENOUS; SUBCUTANEOUS EVERY 4 HOURS PRN
Status: DISCONTINUED | OUTPATIENT
Start: 2017-05-18 | End: 2017-05-20

## 2017-05-18 RX ORDER — LIDOCAINE 40 MG/G
CREAM TOPICAL
Status: DISCONTINUED | OUTPATIENT
Start: 2017-05-18 | End: 2017-06-03

## 2017-05-18 RX ADMIN — OXYCODONE HYDROCHLORIDE 5 MG: 5 SOLUTION ORAL at 23:37

## 2017-05-18 RX ADMIN — LOPERAMIDE HYDROCHLORIDE 4 MG: 1 SOLUTION ORAL at 21:46

## 2017-05-18 RX ADMIN — PIPERACILLIN SODIUM,TAZOBACTAM SODIUM 3.38 G: 3; .375 INJECTION, POWDER, FOR SOLUTION INTRAVENOUS at 21:42

## 2017-05-18 RX ADMIN — RANITIDINE HYDROCHLORIDE 150 MG: 150 SOLUTION ORAL at 21:46

## 2017-05-18 RX ADMIN — Medication 5 ML: at 23:58

## 2017-05-18 RX ADMIN — Medication 6 MG: at 21:46

## 2017-05-18 RX ADMIN — DEXTROSE AND SODIUM CHLORIDE: 5; 450 INJECTION, SOLUTION INTRAVENOUS at 21:42

## 2017-05-18 RX ADMIN — POTASSIUM CHLORIDE 20 MEQ: 1.5 POWDER, FOR SOLUTION ORAL at 23:58

## 2017-05-18 RX ADMIN — GABAPENTIN 300 MG: 250 SOLUTION ORAL at 21:45

## 2017-05-18 RX ADMIN — METOPROLOL TARTRATE 25 MG: 100 TABLET ORAL at 21:45

## 2017-05-18 RX ADMIN — TRAZODONE HYDROCHLORIDE 50 MG: 50 TABLET ORAL at 21:47

## 2017-05-18 RX ADMIN — HYDROMORPHONE HYDROCHLORIDE 0.5 MG: 1 INJECTION, SOLUTION INTRAMUSCULAR; INTRAVENOUS; SUBCUTANEOUS at 21:52

## 2017-05-18 ASSESSMENT — ACTIVITIES OF DAILY LIVING (ADL)
RETIRED_COMMUNICATION: 0-->UNDERSTANDS/COMMUNICATES WITHOUT DIFFICULTY
DRESS: 0-->INDEPENDENT
SWALLOWING: 0-->SWALLOWS FOODS/LIQUIDS WITHOUT DIFFICULTY
CHANGE_IN_FUNCTIONAL_STATUS_SINCE_ONSET_OF_CURRENT_ILLNESS/INJURY: NO
AMBULATION: 0-->INDEPENDENT
SWALLOWING: 0-->SWALLOWS FOODS/LIQUIDS WITHOUT DIFFICULTY
BATHING: 0-->INDEPENDENT
TRANSFERRING: 0-->INDEPENDENT
RETIRED_EATING: 0-->INDEPENDENT
EATING: 0-->INDEPENDENT
TRANSFERRING: 0-->INDEPENDENT
TOILETING: 0-->INDEPENDENT
FALL_HISTORY_WITHIN_LAST_SIX_MONTHS: NO
COGNITION: 0 - NO COGNITION ISSUES REPORTED
BATHING: 0-->INDEPENDENT
AMBULATION: 0-->INDEPENDENT
DRESS: 0-->INDEPENDENT
TOILETING: 0-->INDEPENDENT
COMMUNICATION: 0-->UNDERSTANDS/COMMUNICATES WITHOUT DIFFICULTY

## 2017-05-18 ASSESSMENT — PAIN SCALES - GENERAL: PAINLEVEL: SEVERE PAIN (6)

## 2017-05-18 NOTE — LETTER
Transition Communication Hand-off for Care Transitions to Next Level of Care Provider    Name: Minerva Blanco  MRN #: [id number]  Primary Care Provider: Andrea Nino     Primary Clinic: [facility] [address] [address]     Reason for Hospitalization:  Abscess of sternal region [L02.213]  Admit Date/Time: 5/18/2017  6:42 PM  Discharge Date: 6/29/17  Payor Source: Payor: BCBS / Plan: BCBS PLATINUM BLUE / Product Type: PPO /     Readmission Assessment Measure (BRITANY) Risk Score/category: Very high         Reason for Communication Hand-off Referral: Other D/c to TCU    Discharge Plan:  Social Work Services Discharge Note      Patient Name:  Minerva Blanco     Anticipated Discharge Date:  6/29/17    Discharge Disposition:   TCU:   TCU   [address] 4th floor  [phone]     Pre-Admission Screening (PAS) online form has been completed.  The Level of Care (LOC) is:  Determined  Confirmation Code is:  [id number]  Patient/caregiver informed of referral to [facility] for Pre-Admission Screening for skilled nursing facility (SNF) placement and to expect a phone call post discharge from SNF.     Additional Services/Equipment Arranged:  Pt's  is transporting pt so no alternate transportation has been arranged.      Patient / Family response to discharge plan:  Pt is agreeable     Persons notified of above discharge plan:  Pt, pt reported she will call her  and update him, bedside nurse, charge nurse, NST, receiving facility, Thoracic team     Concern for non-adherence with plan of care:   Y/N Unknown  Discharge Needs Assessment:  Needs       Most Recent Value    Anticipated Changes Related to Illness none    Equipment Currently Used at Home grab bar    Transportation Available family or friend will provide          Already enrolled in Tele-monitoring program and name of program:  Unknown  Follow-up specialty is recommended: Yes    Follow-up plan:  No future  appointments.    Any outstanding tests or procedures:        Referrals     Future Labs/Procedures    Home care nursing referral     Comments:    Resumption of Home Care Services  _______________________  [facility]  Phone  [phone]  Fax  [phone]  ______________________      Your provider has ordered home care nursing services. If you have not been contacted within 2 days of your discharge please call the inpatient department phone number at [phone] .    Home infusion referral     Comments:    Resumption of Home Care Services    Your provider has referred you to: [facility]: [facility] [phone]   [email or web address]    Local Address (if different from home address): N/A    Anticipated Length of Therapy: Per MD Order    Home Infusion Pharmacist to adjust therapy based on labs and clinical assessments: Yes    Labs:  May draw labs from Venous Catheter: Yes  Home Infusion Pharmacist to order labs based on therapy type and clinical assessments: Yes      Agency Staff to assess nursing needs for Infusion Therapy.    Access Device Management:  IV Access Type: PICC  Flush with Heparin and Normal Saline IVP PRN and routine site care (per agency protocol) to maintain access device? Yes    Nutrition Services Adult IP Consult     Comments:    Reason:  TPN management            BALJIT Oden, MSW  7B   [phone] (pager) [phone]  6/29/2017

## 2017-05-18 NOTE — MR AVS SNAPSHOT
After Visit Summary   5/18/2017    Minerva Blanco    MRN: [id number]           Patient Information     Date Of Birth          1945        Visit Information        Provider Department      5/18/2017 12:30 PM Nalini Barreto MD [facility]        Today's Diagnoses     Open wound    -  1       Follow-ups after your visit        Your next 10 appointments already scheduled     Nov 17, 2017   Procedure with Nalini Barreto MD   [facility] [place] Day [facility] (--)    [address] [address]   [phone]            Nov 22, 2017  8:00 AM CST   (Arrive by 7:45 AM)   Return Visit with Jens Wise MD   [facility] ([facility] [place])    [address]  [address] [facility] [address] [phone]            Dec 01, 2017   Procedure with Nalini Barreto MD   [facility] [place] (--)    [address] [address] [phone]              Who to contact     If you have questions or need follow up information about today's clinic visit or your schedule please contact [facility] directly at [phone].  Normal or non-critical lab and imaging results will be communicated to you by [employer], letter or phone within 4 business days after the clinic has received the results. If you do not hear from us within 7 days, please contact the clinic through [employer] or phone. If you have a critical or abnormal lab result, we will notify you by phone as soon as possible.  Submit refill requests through [employer] or call your pharmacy and they will forward the refill request to us. Please allow 3 business days for your refill to be completed.          Additional Information About Your Visit        [employer] Information     [employer] gives you secure access to your electronic health record. If you see a primary care provider, you can also send messages to your care team and make appointments. If  you have questions, please call your primary care clinic.  If you do not have a primary care provider, please call [phone] and they will assist you.        Care EveryWhere ID     This is your Care EveryWhere ID. This could be used by other organizations to access your [facility] medical records  [id number]        Your Vitals Were     Pulse Temperature Height Pulse Oximetry BMI (Body Mass Index)       82 97.2  F (36.2  C) (Oral) 1.524 m (5') 95% 18.46 kg/m2        Blood Pressure from Last 3 Encounters:   11/03/17 112/72   10/26/17 120/68   10/20/17 115/64    Weight from Last 3 Encounters:   11/03/17 42.5 kg (93 lb 11.1 oz)   10/26/17 42.3 kg (93 lb 4.1 oz)   10/20/17 42.9 kg (94 lb 9.2 oz)              We Performed the Following     Gram stain     Wound Culture Aerobic Bacterial          Today's Medication Changes          These changes are accurate as of: 5/18/17  6:42 PM.  If you have any questions, ask your nurse or doctor.               These medicines have changed or have updated prescriptions.        Dose/Directions    loperamide 1 MG/5ML liquid   Commonly known as:  IMODIUM   This may have changed:  when to take this   Used for:  Bowel habit changes        Dose:  4 mg   Take 20 mLs (4 mg) by mouth 4 times daily as needed for diarrhea   Quantity:  200 mL   Refills:  0       traZODone 100 MG tablet   Commonly known as:  DESYREL   This may have changed:  how much to take   Used for:  Insomnia, unspecified type        Dose:  50 mg   0.5 tablets (50 mg) by Per G Tube route At Bedtime   Quantity:  30 tablet   Refills:  0                Primary Care Provider Office Phone # Fax #    Andrea Nino [phone]       [facility] [address] [place] [address]        Equal Access to Services     TOM JORGENSEN AH: Dora Meng, wawilfredoda nick, qaybta kaalflorentin benedict. So [facility] [phone].    ATENCIÓN: satish Soto  a jackson disposición servicios gratuitos de asistencia lingüística. Sreekanth tee [phone].    We comply with applicable federal civil rights laws and [place] laws. We do not discriminate on the basis of race, color, national origin, age, disability, sex, sexual orientation, or gender identity.            Thank you!     Thank you for choosing [facility]  for your care. Our goal is always to provide you with excellent care. Hearing back from our patients is one way we can continue to improve our services. Please take a few minutes to complete the written survey that you may receive in the mail after your visit with us. Thank you!             Your Updated Medication List - Protect others around you: Learn how to safely use, store and throw away your medicines at www.disposemymeds.org.          This list is accurate as of: 5/18/17  6:42 PM.  Always use your most recent med list.                   Brand Name Dispense Instructions for use Diagnosis    BENADRYL PO      Take 25 mg by mouth nightly as needed        cholestyramine 4 G Packet    QUESTRAN     Take 1 packet by mouth daily        CULTURELLE PO      Take 1 tablet by mouth 2 times daily        fiber modular packet     60 packet    1 packet by Per Feeding Tube route 3 times daily (with meals)    History of esophagectomy       gabapentin 250 MG/5ML solution    NEURONTIN    450 mL    Take 6 mLs (300 mg) by mouth every 8 hours    Acute post-operative pain       loperamide 1 MG/5ML liquid    IMODIUM    200 mL    Take 20 mLs (4 mg) by mouth 4 times daily as needed for diarrhea    Bowel habit changes       metoprolol 10 mg/mL Susp    LOPRESSOR     2.5 mLs (25 mg) by Per J Tube route 2 times daily    Atrial fibrillation, unspecified type (H)       multivitamins with minerals Liqd liquid      Take 15 mLs by mouth daily        opium tincture 10 MG/ML (1%) liquid     118 mL    Take 0.6 mLs (6 mg) by mouth every 6 hours    Bowel habit changes       * order  for DME     1 Device    Equipment being ordered:  [facility] Suction Machine-Intermittent Suction Canisters(2) Suction Canister Holders(2) Suction Connect Tube(2) 5 in 1 Connector(2) Bacteria Filter(2) [employer] Suction(2)  Treatment Diagnosis: Esophogeal Perforation, s/p esophagectomy    Hx of esophagectomy       * order for DME     1 Device    Equipment being ordered:  Suction Pump Suction Canister(2) Suction Tubing(2) Bacteria Filter(2) [employer]auer(4) Red Rubber Catheter(2)  Treatment Diagnosis: Esophogeal Perforation, s/p esophagectomy    Esophageal perforation       oxyCODONE IR 10 MG tablet    ROXICODONE    60 tablet    Take 1 to 1.5 tablet every 3-4 hours PRN pain    Acute post-operative pain       ranitidine 150 MG/10ML syrup    Zantac    600 mL    Take 10 mLs (150 mg) by mouth 2 times daily    Hx of esophagectomy       simethicone 40 MG/0.6ML suspension    MYLICON    30 mL    0.6 mLs (40 mg) by Per Feeding Tube route every 6 hours as needed for cramping    Abdominal cramping       traZODone 100 MG tablet    DESYREL    30 tablet    0.5 tablets (50 mg) by Per G Tube route At Bedtime    Insomnia, unspecified type       warfarin 2.5 MG tablet    COUMADIN    30 tablet    Take 1 tablet (2.5 mg) by mouth daily    Paroxysmal atrial fibrillation (H)       * Notice:  This list has 2 medication(s) that are the same as other medications prescribed for you. Read the directions carefully, and ask your doctor or other care provider to review them with you.

## 2017-05-18 NOTE — LETTER
5/18/2017       RE: Minerva Blanco  [address]   [facility] [address]     Dear Colleague,    Thank you for referring your patient, Minerva Blanco, to the [facility]. Please see a copy of my visit note below.    THORACIC SURGERY FOLLOW UP VISIT    Dear Dr. Carroll,  I saw Ms. Minerva Blanco in follow-up today. The clinical summary follows:    PREOP DIAGNOSIS   1.  Chronic esophageal perforation with mediastinal phlegmon.    2.  Status post fundoplication.    3.  Status post esophagectomy with esophageal discontinuity     PROCEDURES   1. Toupet fundoplication (1/2016)  2. Multiple endoscopies and pharyngostomy tube placement x 2 (for drainage of paraesophageal cavity)  3. Esophageal stent placement, attempted placement of biliary stent into the paraesophageal cavity (7/22/2016)  4. Esophageal stent removal, placement of retrograde gastroesophageal tube (10/23/2016)    On 1/9/2017 she underwent a  1.  Laparoscopic proximal gastrectomy and gastrostomy tube placement.    2.  Left thoracotomy with excision of mediastinal phlegmon and distal esophagectomy.    3.  Thoracic duct ligation.      4.  Left esophageal spit fistula.         On, 4/17/2017 she underwent a   1. Laparoscopic retrosternal gastric pull-up, j-tube  2. Partial manubriectomy     INTERVAL STUDIES  None     ETOHNone  TOB None currently  BMI Body mass index is 18.46 kg/(m^2).    SUBJECTIVE  Patient was recently admitted to the hospital from 5/3 to 5/9 for a pneumonia. Since discharge she has been doing okay though recently has had worsening of her fatigue. She still struggles with the tube feeds and diarrhea. She is not sleeping well at night due to diarrhea. On Tuesday she noted significant redness and swelling to her sternal wound. Throughout Tuesday and yesterday the wound started spontaneously draining purulent material- up to half a cup a day per the patient. This area is very tender She denies any fevers or  chills.     From a personal perspective, she is here with her .     IMPRESSION (T14.8) Open wound  (primary encounter diagnosis)    PLAN  I spent a total of *** minutes with M***. Minerva Blanco and ***, more than 50% of which were spent in counseling, coordination of care, and face-to-face time. I reviewed the plan as follows:  1) Wound culture  2) I&D performed at bedside  3) CT chest today to evaluate extent of infection. If superficial, can likely be managed with wound care and antibiotics. May need further intervention or debridement if deep.  4) Update- CT chest concerning for a deeper abscess/fluid collection near the gastric pull through. Will plan to admit for IV antibiotics, OR tomorrow for endoscopy and further debridement. Discussed with patient    They had all their questions answered and were in agreement with the plan.  I appreciate the opportunity to participate in the care of your patient and will keep you updated.  Sincerely,      Nalini Barreto MD

## 2017-05-18 NOTE — IP AVS SNAPSHOT
MRN:[id number]                      After Visit Summary   5/18/2017    Minerva lBanco    MRN: [id number]           Thank you!     Thank you for choosing [facility] for your care. Our goal is always to provide you with excellent care. Hearing back from our patients is one way we can continue to improve our services. Please take a few minutes to complete the written survey that you may receive in the mail after you visit with us. Thank you!        Patient Information     Date Of Birth          1945        Designated Caregiver       Most Recent Value    Caregiver    Will someone help with your care after discharge? yes    Name of designated caregiver Enrique Blanco    Phone number of caregiver [phone]    Caregiver address [address] [place], [place] [id number] [facility] [address]      About your hospital stay     You were admitted on:  May 18, 2017 You last received care in the:  [address] [facility]    You were discharged on:  June 29, 2017       Who to Call     For medical emergencies, please call 911.  For non-urgent questions about your medical care, please call your primary care provider or clinic, [phone]  For questions related to your surgery, please call your surgery clinic        Attending Provider     Provider Jens Giles MD Thoracic Diseases    Nalini Barreto MD Thoracic Diseases       Primary Care Provider Office Phone # Fax #    Andrea Nino [phone]      After Care Instructions     Activity - Up ad renea           Adult Formula Drip Feeding       TwoCal HN @ 35 ml/hr continuously (840 ml/day) to provide 1680 kcals (42 kcal/kg), 71 g PRO (1.8 g/kg), 588 ml free H2O, 184 g CHO and 4 g Fiber daily.            General info for SNF       Length of Stay Estimate: Short Term Care: Estimated # of Days <30  Condition at Discharge: Improving  Level of care:skilled   Rehabilitation Potential: Good  Admission H&P remains valid and up-to-date:  Yes  Recent Chemotherapy: N/A  Use Nursing Home Standing Orders: Yes            IV access       Please do NOT remove PICC line. Needed for TPN            Mantoux instructions       Give two-step Mantoux (PPD) Per Facility Policy Yes            Ostomy care       J-tube: daily dressing changes, maintain flexitrack, flush after meds and tube feeds    Pharyngostomy tube: 30 mL flushes qshift. Low intermittent suction. OK to put to gravity or clamp with activity            Wound care (specify)       Pack sternal wound with kerlix daily.                  Follow-up Appointments     Follow Up ([facility])       EGD and washout 7/17/17                  Your next 10 appointments already scheduled     Jul 17, 2017   Procedure with Jens Wise MD   [facility], [place], Same Day Surgery (--)    [address]  [place] [address] [phone]              Additional Services     Home care nursing referral       Resumption of Home Care Services  _______________________  [facility]  Phone  [phone]  Fax  [phone]  ______________________      Your provider has ordered home care nursing services. If you have not been contacted within 2 days of your discharge please call the inpatient department phone number at [phone] .            Home infusion referral       Resumption of Home Care Services    Your provider has referred you to: [facility]: [facility] [phone]   [email or web address]    Local Address (if different from home address): N/A    Anticipated Length of Therapy: Per MD Order    Home Infusion Pharmacist to adjust therapy based on labs and clinical assessments: Yes    Labs:  May draw labs from Venous Catheter: Yes  Home Infusion Pharmacist to order labs based on therapy type and clinical assessments: Yes      Agency Staff to assess nursing needs for Infusion Therapy.    Access Device Management:  IV Access Type: PICC  Flush with Heparin and  "Normal Saline IVP PRN and routine site care (per agency protocol) to maintain access device? Yes            Nutrition Services Adult IP Consult       Reason:  TPN management                  Pending Results     No orders found from 2017 to 2017.            Admission Information     Date & Time Provider Department Dept. Phone    2017 Nalini Barreto MD [address] [facility] [phone]      Your Vitals Were     Blood Pressure Pulse Temperature Respirations Height Weight    127/58 (BP Location: Left arm) 81 96  F (35.6  C) (Oral) 18 1.524 m (5') 41.2 kg (90 lb 14.4 oz)    Pulse Oximetry BMI (Body Mass Index)                96% 17.75 kg/m2          [facility] Information     [employer] lets you send messages to your doctor, view your test results, renew your prescriptions, schedule appointments and more. To sign up, go to [email or web address] . Click on \"Log in\" on the left side of the screen, which will take you to the Welcome page. Then click on \"Sign up Now\" on the right side of the page.     You will be asked to enter the access code listed below, as well as some personal information. Please follow the directions to create your username and password.     Your access code is: [email or web address]  Expires: 2017 11:41 AM     Your access code will  in 90 days. If you need help or a new code, please call your [facility] clinic or [phone].        Care EveryWhere ID     This is your Care EveryWhere ID. This could be used by other organizations to access your [facility] medical records  [id number]        Equal Access to Services     [facility] : Hadkaye Meng, wawilfredoda luricardo, qaybta nirualflorentin benedict. So [facility] [phone].    ATENCIÓN: Si habla español, tiene a jackson disposición servicios gratuitos de asistencia lingüística. Llame al [phone].    We comply with applicable federal civil rights laws and [place] laws. We do not discriminate " on the basis of race, color, national origin, age, disability sex, sexual orientation or gender identity.               Review of your medicines      START taking        Dose / Directions    chlorhexidine 4 % liquid   Commonly known as:  HIBICLENS   Used for:  History of esophagectomy        Apply topically 2 times daily   Quantity:  200 mL   Refills:  0       enoxaparin 40 MG/0.4ML injection   Commonly known as:  LOVENOX   Used for:  Paroxysmal atrial fibrillation (H)        Dose:  40 mg   Inject 0.4 mLs (40 mg) Subcutaneous every 24 hours   Refills:  0       * oxyCODONE 5 MG/5ML solution   Commonly known as:  ROXICODONE   Used for:  History of esophagectomy   Replaces:  oxyCODONE 10 MG IR tablet        Dose:  10-20 mg   10-20 mLs (10-20 mg) by Per J Tube route every 3 hours as needed for moderate to severe pain   Quantity:  100 mL   Refills:  0       * oxyCODONE 10 MG IR tablet   Commonly known as:  ROXICODONE   Used for:  Acute post-operative pain        Take 1 to 1.5 tablet every 3-4 hours PRN pain   Quantity:  60 tablet   Refills:  0       * Notice:  This list has 2 medication(s) that are the same as other medications prescribed for you. Read the directions carefully, and ask your doctor or other care provider to review them with you.      CONTINUE these medicines which may have CHANGED, or have new prescriptions. If we are uncertain of the size of tablets/capsules you have at home, strength may be listed as something that might have changed.        Dose / Directions    loperamide 1 MG/5ML liquid   Commonly known as:  IMODIUM   This may have changed:  when to take this   Used for:  Bowel habit changes        Dose:  4 mg   Take 20 mLs (4 mg) by mouth 4 times daily as needed for diarrhea   Quantity:  200 mL   Refills:  0       simethicone 40 MG/0.6ML suspension   Commonly known as:  MYLICON   This may have changed:    - when to take this  - reasons to take this   Used for:  History of esophagectomy        Dose:   40 mg   0.6 mLs (40 mg) by Per Feeding Tube route 4 times daily   Refills:  0       traZODone 100 MG tablet   Commonly known as:  DESYREL   This may have changed:  how much to take   Used for:  Insomnia, unspecified type        Dose:  50 mg   0.5 tablets (50 mg) by Per G Tube route At Bedtime   Quantity:  30 tablet   Refills:  0         CONTINUE these medicines which have NOT CHANGED        Dose / Directions    BENADRYL PO        Dose:  25 mg   Take 25 mg by mouth nightly as needed   Refills:  0       cholestyramine 4 G Packet   Commonly known as:  QUESTRAN        Dose:  1 packet   Take 1 packet by mouth daily   Refills:  0       CULTURELLE PO        Dose:  1 tablet   Take 1 tablet by mouth 2 times daily   Refills:  0       diphenoxylate-atropine 2.5-0.025 MG/5ML liquid   Commonly known as:  LOMOTIL   Used for:  Bowel habit changes        Dose:  5 mL   Take 5 mLs by mouth 4 times daily as needed for diarrhea   Quantity:  300 mL   Refills:  0       ferrous sulfate 300 (60 FE) MG/5ML syrup   Used for:  Anemia due to other cause        Dose:  300 mg   5 mLs (300 mg) by Per J Tube route daily   Quantity:  150 mL   Refills:  0       fiber modular packet   Used for:  History of esophagectomy        Dose:  1 packet   1 packet by Per Feeding Tube route 3 times daily (with meals)   Quantity:  60 packet   Refills:  0       gabapentin 250 MG/5ML solution   Commonly known as:  NEURONTIN   Used for:  Acute post-operative pain        Dose:  300 mg   Take 6 mLs (300 mg) by mouth every 8 hours   Quantity:  450 mL   Refills:  0       metoprolol 10 mg/mL Susp   Commonly known as:  LOPRESSOR   Used for:  Atrial fibrillation, unspecified type (H)        Dose:  25 mg   2.5 mLs (25 mg) by Per J Tube route 2 times daily   Refills:  0       multivitamins with minerals Liqd liquid        Dose:  15 mL   Take 15 mLs by mouth daily   Refills:  0       opium tincture 10 MG/ML (1%) liquid   Used for:  Bowel habit changes        Dose:  0.6 mL    Take 0.6 mLs (6 mg) by mouth every 6 hours   Quantity:  118 mL   Refills:  0       * order for DME   Used for:  Hx of esophagectomy        Equipment being ordered:  [facility] Suction Machine-Intermittent Suction Canisters(2) Suction Canister Holders(2) Suction Connect Tube(2) 5 in 1 Connector(2) Bacteria Filter(2) Yaunkauer Suction(2)  Treatment Diagnosis: Esophogeal Perforation, s/p esophagectomy   Quantity:  1 Device   Refills:  0       * order for DME   Used for:  Esophageal perforation        Equipment being ordered:  Suction Pump Suction Canister(2) Suction Tubing(2) Bacteria Filter(2) Yaunkauer(4) Red Rubber Catheter(2)  Treatment Diagnosis: Esophogeal Perforation, s/p esophagectomy   Quantity:  1 Device   Refills:  0       ranitidine 150 MG/10ML syrup   Commonly known as:  Zantac   Used for:  Hx of esophagectomy        Dose:  150 mg   Take 10 mLs (150 mg) by mouth 2 times daily   Quantity:  600 mL   Refills:  0       * Notice:  This list has 2 medication(s) that are the same as other medications prescribed for you. Read the directions carefully, and ask your doctor or other care provider to review them with you.      STOP taking     oxyCODONE 10 MG IR tablet   Commonly known as:  ROXICODONE   Replaced by:  oxyCODONE 5 MG/5ML solution           warfarin 2.5 MG tablet   Commonly known as:  COUMADIN                Where to get your medicines      Some of these will need a paper prescription and others can be bought over the counter. Ask your nurse if you have questions.     Bring a paper prescription for each of these medications     oxyCODONE 10 MG IR tablet    oxyCODONE 5 MG/5ML solution       You don't need a prescription for these medications     chlorhexidine 4 % liquid    enoxaparin 40 MG/0.4ML injection    simethicone 40 MG/0.6ML suspension                Protect others around you: Learn how to safely use, store and throw away your medicines at www.disposemymeds.org.             Medication List: This is a  list of all your medications and when to take them. Check marks below indicate your daily home schedule. Keep this list as a reference.      Medications           Morning Afternoon Evening Bedtime As Needed    BENADRYL PO   Take 25 mg by mouth nightly as needed   Last time this was given:  25 mg on 6/19/2017 10:45 PM                                chlorhexidine 4 % liquid   Commonly known as:  HIBICLENS   Apply topically 2 times daily   Last time this was given:  6/29/2017  7:47 AM                                cholestyramine 4 G Packet   Commonly known as:  QUESTRAN   Take 1 packet by mouth daily   Last time this was given:  4 g on 6/29/2017 10:08 AM                                CULTURELLE PO   Take 1 tablet by mouth 2 times daily                                diphenoxylate-atropine 2.5-0.025 MG/5ML liquid   Commonly known as:  LOMOTIL   Take 5 mLs by mouth 4 times daily as needed for diarrhea   Last time this was given:  10 mLs on 6/29/2017 12:00 PM                                enoxaparin 40 MG/0.4ML injection   Commonly known as:  LOVENOX   Inject 0.4 mLs (40 mg) Subcutaneous every 24 hours   Last time this was given:  40 mg on 6/29/2017 12:01 PM                                ferrous sulfate 300 (60 FE) MG/5ML syrup   5 mLs (300 mg) by Per J Tube route daily   Last time this was given:  300 mg on 6/29/2017  7:48 AM                                fiber modular packet   1 packet by Per Feeding Tube route 3 times daily (with meals)   Last time this was given:  2 packets on 6/29/2017  7:48 AM                                gabapentin 250 MG/5ML solution   Commonly known as:  NEURONTIN   Take 6 mLs (300 mg) by mouth every 8 hours   Last time this was given:  300 mg on 6/29/2017  7:48 AM                                loperamide 1 MG/5ML liquid   Commonly known as:  IMODIUM   Take 20 mLs (4 mg) by mouth 4 times daily as needed for diarrhea   Last time this was given:  4 mg on 6/29/2017 12:00 PM                                 metoprolol 10 mg/mL Susp   Commonly known as:  LOPRESSOR   2.5 mLs (25 mg) by Per J Tube route 2 times daily   Last time this was given:  25 mg on 6/29/2017  7:47 AM                                multivitamins with minerals Liqd liquid   Take 15 mLs by mouth daily                                opium tincture 10 MG/ML (1%) liquid   Take 0.6 mLs (6 mg) by mouth every 6 hours   Last time this was given:  6 mg on 6/29/2017 10:14 AM                                * order for DME   Equipment being ordered:  [facility] Suction Machine-Intermittent Suction Canisters(2) Suction Canister Holders(2) Suction Connect Tube(2) 5 in 1 Connector(2) Bacteria Filter(2) Yaunkauer Suction(2)  Treatment Diagnosis: Esophogeal Perforation, s/p esophagectomy                                * order for DME   Equipment being ordered:  Suction Pump Suction Canister(2) Suction Tubing(2) Bacteria Filter(2) Yaunkauer(4) Red Rubber Catheter(2)  Treatment Diagnosis: Esophogeal Perforation, s/p esophagectomy                                * oxyCODONE 5 MG/5ML solution   Commonly known as:  ROXICODONE   10-20 mLs (10-20 mg) by Per J Tube route every 3 hours as needed for moderate to severe pain   Last time this was given:  15 mg on 6/29/2017 10:49 AM                                * oxyCODONE 10 MG IR tablet   Commonly known as:  ROXICODONE   Take 1 to 1.5 tablet every 3-4 hours PRN pain                                ranitidine 150 MG/10ML syrup   Commonly known as:  Zantac   Take 10 mLs (150 mg) by mouth 2 times daily                                simethicone 40 MG/0.6ML suspension   Commonly known as:  MYLICON   0.6 mLs (40 mg) by Per Feeding Tube route 4 times daily   Last time this was given:  40 mg on 6/29/2017 12:01 PM                                traZODone 100 MG tablet   Commonly known as:  DESYREL   0.5 tablets (50 mg) by Per G Tube route At Bedtime   Last time this was given:  100 mg on 6/28/2017  8:33 PM                                 * Notice:  This list has 4 medication(s) that are the same as other medications prescribed for you. Read the directions carefully, and ask your doctor or other care provider to review them with you.

## 2017-05-18 NOTE — LETTER
5/18/2017      RE: Minerva Blanco  [address]   [facility] [address]       THORACIC SURGERY FOLLOW UP VISIT    Dear Dr. Carroll,  I saw Ms. Minerva Blanco in follow-up today. The clinical summary follows:    PREOP DIAGNOSIS   1.  Chronic esophageal perforation with mediastinal phlegmon.    2.  Status post fundoplication.    3.  Status post esophagectomy with esophageal discontinuity     PROCEDURES   1. Toupet fundoplication (1/2016)  2. Multiple endoscopies and pharyngostomy tube placement x 2 (for drainage of paraesophageal cavity)  3. Esophageal stent placement, attempted placement of biliary stent into the paraesophageal cavity (7/22/2016)  4. Esophageal stent removal, placement of retrograde gastroesophageal tube (10/23/2016)    On 1/9/2017 she underwent a  1.  Laparoscopic proximal gastrectomy and gastrostomy tube placement.    2.  Left thoracotomy with excision of mediastinal phlegmon and distal esophagectomy.    3.  Thoracic duct ligation.      4.  Left esophageal spit fistula.         On, 4/17/2017 she underwent a   1. Laparoscopic retrosternal gastric pull-up, j-tube  2. Partial manubriectomy     INTERVAL STUDIES  None     ETOHNone  TOB None currently  BMI Body mass index is 18.46 kg/(m^2).    SUBJECTIVE  Patient was recently admitted to the hospital from 5/3 to 5/9 for a pneumonia. Since discharge she has been doing okay though recently has had worsening of her fatigue. She still struggles with the tube feeds and diarrhea. She is not sleeping well at night due to diarrhea. On Tuesday she noted significant redness and swelling to her sternal wound. Throughout Tuesday and yesterday the wound started spontaneously draining purulent material- up to half a cup a day per the patient. This area is very tender She denies any fevers or chills.     From a personal perspective, she is here with her .     IMPRESSION (T14.8) Open wound  (primary encounter diagnosis)    PLAN  I spent a total of  *** minutes with M***. Minerva Blanco and ***, more than 50% of which were spent in counseling, coordination of care, and face-to-face time. I reviewed the plan as follows:  1) Wound culture  2) I&D performed at bedside  3) CT chest today to evaluate extent of infection. If superficial, can likely be managed with wound care and antibiotics. May need further intervention or debridement if deep.  4) Update- CT chest concerning for a deeper abscess/fluid collection near the gastric pull through. Will plan to admit for IV antibiotics, OR tomorrow for endoscopy and further debridement. Discussed with patient    They had all their questions answered and were in agreement with the plan.  I appreciate the opportunity to participate in the care of your patient and will keep you updated.  Sincerely,      Nalini Barreto MD

## 2017-05-18 NOTE — PROGRESS NOTES
THORACIC SURGERY FOLLOW UP VISIT    Dear Dr. Carroll,  I saw Ms. Minerva Blanco in follow-up today. The clinical summary follows:    PREOP DIAGNOSIS   1.  Chronic esophageal perforation with mediastinal phlegmon.    2.  Status post fundoplication.    3.  Status post esophagectomy with esophageal discontinuity     PROCEDURES   1. Toupet fundoplication (1/2016)  2. Multiple endoscopies and pharyngostomy tube placement x 2 (for drainage of paraesophageal cavity)  3. Esophageal stent placement, attempted placement of biliary stent into the paraesophageal cavity (7/22/2016)  4. Esophageal stent removal, placement of retrograde gastroesophageal tube (10/23/2016)    On 1/9/2017 she underwent a  1.  Laparoscopic proximal gastrectomy and gastrostomy tube placement.    2.  Left thoracotomy with excision of mediastinal phlegmon and distal esophagectomy.    3.  Thoracic duct ligation.      4.  Left esophageal spit fistula.         On, 4/17/2017 she underwent a   1. Laparoscopic retrosternal gastric pull-up, j-tube  2. Partial manubriectomy     INTERVAL STUDIES  None     ETOHNone  TOB None currently  BMI Body mass index is 18.46 kg/(m^2).    SUBJECTIVE  Patient was recently admitted to the hospital from 5/3 to 5/9 for a pneumonia. Since discharge she has been doing okay though recently has had worsening of her fatigue. She still struggles with the tube feeds and diarrhea. She is not sleeping well at night due to diarrhea. On Tuesday she noted significant redness and swelling to her sternal wound. Throughout Tuesday and yesterday the wound started spontaneously draining purulent material- up to half a cup a day per the patient. This area is very tender She denies any fevers or chills.     From a personal perspective, she is here with her .     IMPRESSION (T14.8) Open wound  (primary encounter diagnosis)    PLAN  I spent a total of 30 minutes with Ms. Minerva Blanco , more than 50% of which were spent in counseling,  coordination of care, and face-to-face time. I reviewed the plan as follows:  1) Wound culture  2) I&D performed at bedside  3) CT chest today to evaluate extent of infection. If superficial, can likely be managed with wound care and antibiotics. May need further intervention or debridement if deep.  4) Update- CT chest concerning for a deeper abscess/fluid collection near the gastric pull through. Will plan to admit for IV antibiotics, OR tomorrow for endoscopy and further debridement. Discussed with patient    They had all their questions answered and were in agreement with the plan.  I appreciate the opportunity to participate in the care of your patient and will keep you updated.  Sincerely,

## 2017-05-18 NOTE — IP AVS SNAPSHOT
[address] [address]    Phone:  [phone]                                       After Visit Summary   5/18/2017    Minerva Blanco    MRN: [id number]           After Visit Summary Signature Page     I have received my discharge instructions, and my questions have been answered. I have discussed any challenges I see with this plan with the nurse or doctor.    ..........................................................................................................................................  Patient/Patient Representative Signature      ..........................................................................................................................................  Patient Representative Print Name and Relationship to Patient    ..................................................               ................................................  Date                                            Time    ..........................................................................................................................................  Reviewed by Signature/Title    ...................................................              ..............................................  Date                                                            Time

## 2017-05-18 NOTE — NURSING NOTE
Oncology Rooming Note    May 18, 2017 12:59 PM   Minerva Blanco is a 72 year old female who presents for:    Chief Complaint   Patient presents with     Oncology Clinic Visit     Looking at an open wound     Initial Vitals: BP 96/65  Pulse 82  Temp 97.2  F (36.2  C) (Oral)  Ht 1.524 m (5')  Wt 42.9 kg (94 lb 8 oz)  SpO2 95%  BMI 18.46 kg/m2 Estimated body mass index is 18.46 kg/(m^2) as calculated from the following:    Height as of this encounter: 1.524 m (5').    Weight as of this encounter: 42.9 kg (94 lb 8 oz). Body surface area is 1.35 meters squared.  Severe Pain (6) Comment: Around the wound    No LMP recorded. Patient is postmenopausal.  Allergies reviewed: Yes  Medications reviewed: Yes    Medications: Medication refills not needed today.  Pharmacy name entered into [employer]: [id number] [place] [facility] [place], [place] - [address]    Clinical concerns: Pt. Needs a wound looked at     10 minutes for nursing intake (face to face time)     Caterina Bernal, NELDA

## 2017-05-19 ENCOUNTER — APPOINTMENT (OUTPATIENT)
Dept: GENERAL RADIOLOGY | Facility: CLINIC | Age: 72
DRG: 856 | End: 2017-05-19
Attending: ANESTHESIOLOGY
Payer: MEDICARE

## 2017-05-19 ENCOUNTER — ANESTHESIA EVENT (OUTPATIENT)
Dept: SURGERY | Facility: CLINIC | Age: 72
DRG: 856 | End: 2017-05-19
Payer: MEDICARE

## 2017-05-19 ENCOUNTER — ANESTHESIA (OUTPATIENT)
Dept: SURGERY | Facility: CLINIC | Age: 72
DRG: 856 | End: 2017-05-19
Payer: MEDICARE

## 2017-05-19 PROBLEM — K91.89 ESOPHAGEAL ANASTOMOTIC LEAK: Status: ACTIVE | Noted: 2017-05-19

## 2017-05-19 LAB
ABO + RH BLD: NORMAL
ABO + RH BLD: NORMAL
ANION GAP SERPL CALCULATED.3IONS-SCNC: 9 MMOL/L (ref 3–14)
APTT PPP: 54 SEC (ref 22–37)
BASOPHILS # BLD AUTO: 0 10E9/L (ref 0–0.2)
BASOPHILS NFR BLD AUTO: 0.1 %
BLD GP AB SCN SERPL QL: NORMAL
BLD PROD TYP BPU: NORMAL
BLD UNIT ID BPU: 0
BLOOD BANK CMNT PATIENT-IMP: NORMAL
BLOOD PRODUCT CODE: NORMAL
BPU ID: NORMAL
BUN SERPL-MCNC: 6 MG/DL (ref 7–30)
CALCIUM SERPL-MCNC: 7.9 MG/DL (ref 8.5–10.1)
CHLORIDE SERPL-SCNC: 101 MMOL/L (ref 94–109)
CO2 SERPL-SCNC: 25 MMOL/L (ref 20–32)
CREAT SERPL-MCNC: 0.45 MG/DL (ref 0.52–1.04)
DIFFERENTIAL METHOD BLD: ABNORMAL
EOSINOPHIL # BLD AUTO: 0.1 10E9/L (ref 0–0.7)
EOSINOPHIL NFR BLD AUTO: 0.6 %
ERYTHROCYTE [DISTWIDTH] IN BLOOD BY AUTOMATED COUNT: 15.9 % (ref 10–15)
ERYTHROCYTE [DISTWIDTH] IN BLOOD BY AUTOMATED COUNT: 16.1 % (ref 10–15)
FIBRINOGEN PPP-MCNC: 497 MG/DL (ref 200–420)
GFR SERPL CREATININE-BSD FRML MDRD: ABNORMAL ML/MIN/1.7M2
GLUCOSE SERPL-MCNC: 99 MG/DL (ref 70–99)
HCT VFR BLD AUTO: 18.8 % (ref 35–47)
HCT VFR BLD AUTO: 25.2 % (ref 35–47)
HGB BLD-MCNC: 6 G/DL (ref 11.7–15.7)
HGB BLD-MCNC: 6 G/DL (ref 11.7–15.7)
HGB BLD-MCNC: 7.9 G/DL (ref 11.7–15.7)
IMM GRANULOCYTES # BLD: 0.1 10E9/L (ref 0–0.4)
IMM GRANULOCYTES NFR BLD: 0.3 %
INR PPP: 1.87 (ref 0.86–1.14)
INR PPP: 2.64 (ref 0.86–1.14)
INR PPP: 3.61 (ref 0.86–1.14)
LYMPHOCYTES # BLD AUTO: 1.1 10E9/L (ref 0.8–5.3)
LYMPHOCYTES NFR BLD AUTO: 7.1 %
MAGNESIUM SERPL-MCNC: 1.8 MG/DL (ref 1.6–2.3)
MCH RBC QN AUTO: 26.7 PG (ref 26.5–33)
MCH RBC QN AUTO: 27.1 PG (ref 26.5–33)
MCHC RBC AUTO-ENTMCNC: 31.3 G/DL (ref 31.5–36.5)
MCHC RBC AUTO-ENTMCNC: 31.9 G/DL (ref 31.5–36.5)
MCV RBC AUTO: 85 FL (ref 78–100)
MCV RBC AUTO: 85 FL (ref 78–100)
MONOCYTES # BLD AUTO: 1.4 10E9/L (ref 0–1.3)
MONOCYTES NFR BLD AUTO: 9.4 %
NEUTROPHILS # BLD AUTO: 12.6 10E9/L (ref 1.6–8.3)
NEUTROPHILS NFR BLD AUTO: 82.5 %
NRBC # BLD AUTO: 0 10*3/UL
NRBC BLD AUTO-RTO: 0 /100
NUM BPU REQUESTED: 1
NUM BPU REQUESTED: 5
PHOSPHATE SERPL-MCNC: 3 MG/DL (ref 2.5–4.5)
PLATELET # BLD AUTO: 433 10E9/L (ref 150–450)
PLATELET # BLD AUTO: 495 10E9/L (ref 150–450)
PLATELET # BLD AUTO: 620 10E9/L (ref 150–450)
POTASSIUM SERPL-SCNC: 3.5 MMOL/L (ref 3.4–5.3)
RBC # BLD AUTO: 2.21 10E12/L (ref 3.8–5.2)
RBC # BLD AUTO: 2.96 10E12/L (ref 3.8–5.2)
SODIUM SERPL-SCNC: 135 MMOL/L (ref 133–144)
SPECIMEN EXP DATE BLD: NORMAL
TRANSFUSION STATUS PATIENT QL: NORMAL
WBC # BLD AUTO: 15.3 10E9/L (ref 4–11)
WBC # BLD AUTO: 16 10E9/L (ref 4–11)

## 2017-05-19 PROCEDURE — 25000125 ZZHC RX 250: Performed by: THORACIC SURGERY (CARDIOTHORACIC VASCULAR SURGERY)

## 2017-05-19 PROCEDURE — 36415 COLL VENOUS BLD VENIPUNCTURE: CPT | Performed by: PHYSICIAN ASSISTANT

## 2017-05-19 PROCEDURE — 87040 BLOOD CULTURE FOR BACTERIA: CPT | Performed by: STUDENT IN AN ORGANIZED HEALTH CARE EDUCATION/TRAINING PROGRAM

## 2017-05-19 PROCEDURE — A9270 NON-COVERED ITEM OR SERVICE: HCPCS | Mod: GY | Performed by: THORACIC SURGERY (CARDIOTHORACIC VASCULAR SURGERY)

## 2017-05-19 PROCEDURE — 0DJ68ZZ INSPECTION OF STOMACH, VIA NATURAL OR ARTIFICIAL OPENING ENDOSCOPIC: ICD-10-PCS | Performed by: STUDENT IN AN ORGANIZED HEALTH CARE EDUCATION/TRAINING PROGRAM

## 2017-05-19 PROCEDURE — 25000128 H RX IP 250 OP 636: Performed by: ANESTHESIOLOGY

## 2017-05-19 PROCEDURE — 40000344 ZZHCL STATISTIC THAWING COMPONENT: Performed by: SURGERY

## 2017-05-19 PROCEDURE — 85384 FIBRINOGEN ACTIVITY: CPT | Performed by: STUDENT IN AN ORGANIZED HEALTH CARE EDUCATION/TRAINING PROGRAM

## 2017-05-19 PROCEDURE — 25000125 ZZHC RX 250: Performed by: ANESTHESIOLOGY

## 2017-05-19 PROCEDURE — 85025 COMPLETE CBC W/AUTO DIFF WBC: CPT | Performed by: STUDENT IN AN ORGANIZED HEALTH CARE EDUCATION/TRAINING PROGRAM

## 2017-05-19 PROCEDURE — 25000128 H RX IP 250 OP 636: Performed by: SURGERY

## 2017-05-19 PROCEDURE — A9270 NON-COVERED ITEM OR SERVICE: HCPCS | Mod: GY | Performed by: SURGERY

## 2017-05-19 PROCEDURE — 80048 BASIC METABOLIC PNL TOTAL CA: CPT | Performed by: STUDENT IN AN ORGANIZED HEALTH CARE EDUCATION/TRAINING PROGRAM

## 2017-05-19 PROCEDURE — 37000008 ZZH ANESTHESIA TECHNICAL FEE, 1ST 30 MIN: Performed by: STUDENT IN AN ORGANIZED HEALTH CARE EDUCATION/TRAINING PROGRAM

## 2017-05-19 PROCEDURE — 36000064 ZZH SURGERY LEVEL 4 EA 15 ADDTL MIN - UMMC: Performed by: STUDENT IN AN ORGANIZED HEALTH CARE EDUCATION/TRAINING PROGRAM

## 2017-05-19 PROCEDURE — 84100 ASSAY OF PHOSPHORUS: CPT | Performed by: STUDENT IN AN ORGANIZED HEALTH CARE EDUCATION/TRAINING PROGRAM

## 2017-05-19 PROCEDURE — 85610 PROTHROMBIN TIME: CPT | Performed by: STUDENT IN AN ORGANIZED HEALTH CARE EDUCATION/TRAINING PROGRAM

## 2017-05-19 PROCEDURE — 85730 THROMBOPLASTIN TIME PARTIAL: CPT | Performed by: STUDENT IN AN ORGANIZED HEALTH CARE EDUCATION/TRAINING PROGRAM

## 2017-05-19 PROCEDURE — 36415 COLL VENOUS BLD VENIPUNCTURE: CPT | Performed by: STUDENT IN AN ORGANIZED HEALTH CARE EDUCATION/TRAINING PROGRAM

## 2017-05-19 PROCEDURE — C9399 UNCLASSIFIED DRUGS OR BIOLOG: HCPCS | Performed by: NURSE ANESTHETIST, CERTIFIED REGISTERED

## 2017-05-19 PROCEDURE — 85049 AUTOMATED PLATELET COUNT: CPT | Performed by: THORACIC SURGERY (CARDIOTHORACIC VASCULAR SURGERY)

## 2017-05-19 PROCEDURE — 94640 AIRWAY INHALATION TREATMENT: CPT

## 2017-05-19 PROCEDURE — 85610 PROTHROMBIN TIME: CPT | Performed by: PHYSICIAN ASSISTANT

## 2017-05-19 PROCEDURE — 25000128 H RX IP 250 OP 636: Performed by: THORACIC SURGERY (CARDIOTHORACIC VASCULAR SURGERY)

## 2017-05-19 PROCEDURE — 0W9C30Z DRAINAGE OF MEDIASTINUM WITH DRAINAGE DEVICE, PERCUTANEOUS APPROACH: ICD-10-PCS | Performed by: STUDENT IN AN ORGANIZED HEALTH CARE EDUCATION/TRAINING PROGRAM

## 2017-05-19 PROCEDURE — 40000275 ZZH STATISTIC RCP TIME EA 10 MIN

## 2017-05-19 PROCEDURE — 25000128 H RX IP 250 OP 636: Performed by: STUDENT IN AN ORGANIZED HEALTH CARE EDUCATION/TRAINING PROGRAM

## 2017-05-19 PROCEDURE — P9016 RBC LEUKOCYTES REDUCED: HCPCS | Performed by: STUDENT IN AN ORGANIZED HEALTH CARE EDUCATION/TRAINING PROGRAM

## 2017-05-19 PROCEDURE — 36000062 ZZH SURGERY LEVEL 4 1ST 30 MIN - UMMC: Performed by: STUDENT IN AN ORGANIZED HEALTH CARE EDUCATION/TRAINING PROGRAM

## 2017-05-19 PROCEDURE — 71000014 ZZH RECOVERY PHASE 1 LEVEL 2 FIRST HR: Performed by: STUDENT IN AN ORGANIZED HEALTH CARE EDUCATION/TRAINING PROGRAM

## 2017-05-19 PROCEDURE — 36415 COLL VENOUS BLD VENIPUNCTURE: CPT | Performed by: ANESTHESIOLOGY

## 2017-05-19 PROCEDURE — 85027 COMPLETE CBC AUTOMATED: CPT | Performed by: STUDENT IN AN ORGANIZED HEALTH CARE EDUCATION/TRAINING PROGRAM

## 2017-05-19 PROCEDURE — 25000132 ZZH RX MED GY IP 250 OP 250 PS 637: Mod: GY | Performed by: THORACIC SURGERY (CARDIOTHORACIC VASCULAR SURGERY)

## 2017-05-19 PROCEDURE — P9059 PLASMA, FRZ BETWEEN 8-24HOUR: HCPCS | Performed by: SURGERY

## 2017-05-19 PROCEDURE — 20000004 ZZH R&B ICU UMMC

## 2017-05-19 PROCEDURE — 94799 UNLISTED PULMONARY SVC/PX: CPT

## 2017-05-19 PROCEDURE — 0WBC0ZZ EXCISION OF MEDIASTINUM, OPEN APPROACH: ICD-10-PCS | Performed by: STUDENT IN AN ORGANIZED HEALTH CARE EDUCATION/TRAINING PROGRAM

## 2017-05-19 PROCEDURE — 85610 PROTHROMBIN TIME: CPT | Performed by: ANESTHESIOLOGY

## 2017-05-19 PROCEDURE — 25000128 H RX IP 250 OP 636: Performed by: NURSE ANESTHETIST, CERTIFIED REGISTERED

## 2017-05-19 PROCEDURE — 27210794 ZZH OR GENERAL SUPPLY STERILE: Performed by: STUDENT IN AN ORGANIZED HEALTH CARE EDUCATION/TRAINING PROGRAM

## 2017-05-19 PROCEDURE — 85018 HEMOGLOBIN: CPT | Performed by: STUDENT IN AN ORGANIZED HEALTH CARE EDUCATION/TRAINING PROGRAM

## 2017-05-19 PROCEDURE — 37000009 ZZH ANESTHESIA TECHNICAL FEE, EACH ADDTL 15 MIN: Performed by: STUDENT IN AN ORGANIZED HEALTH CARE EDUCATION/TRAINING PROGRAM

## 2017-05-19 PROCEDURE — 25000565 ZZH ISOFLURANE, EA 15 MIN: Performed by: STUDENT IN AN ORGANIZED HEALTH CARE EDUCATION/TRAINING PROGRAM

## 2017-05-19 PROCEDURE — 40000985 XR CHEST PORT 1 VW

## 2017-05-19 PROCEDURE — 71000015 ZZH RECOVERY PHASE 1 LEVEL 2 EA ADDTL HR: Performed by: STUDENT IN AN ORGANIZED HEALTH CARE EDUCATION/TRAINING PROGRAM

## 2017-05-19 PROCEDURE — 25000132 ZZH RX MED GY IP 250 OP 250 PS 637: Mod: GY | Performed by: SURGERY

## 2017-05-19 PROCEDURE — 40000170 ZZH STATISTIC PRE-PROCEDURE ASSESSMENT II: Performed by: STUDENT IN AN ORGANIZED HEALTH CARE EDUCATION/TRAINING PROGRAM

## 2017-05-19 PROCEDURE — 83735 ASSAY OF MAGNESIUM: CPT | Performed by: STUDENT IN AN ORGANIZED HEALTH CARE EDUCATION/TRAINING PROGRAM

## 2017-05-19 RX ORDER — PHYTONADIONE 1 MG/.5ML
10 INJECTION, EMULSION INTRAMUSCULAR; INTRAVENOUS; SUBCUTANEOUS ONCE
Status: DISCONTINUED | OUTPATIENT
Start: 2017-05-19 | End: 2017-05-19 | Stop reason: CLARIF

## 2017-05-19 RX ORDER — POTASSIUM CHLORIDE 1.5 G/1.58G
20-40 POWDER, FOR SOLUTION ORAL
Status: DISCONTINUED | OUTPATIENT
Start: 2017-05-19 | End: 2017-05-19

## 2017-05-19 RX ORDER — MAGNESIUM SULFATE HEPTAHYDRATE 40 MG/ML
4 INJECTION, SOLUTION INTRAVENOUS EVERY 4 HOURS PRN
Status: DISCONTINUED | OUTPATIENT
Start: 2017-05-19 | End: 2017-05-19

## 2017-05-19 RX ORDER — AMOXICILLIN 250 MG
1-2 CAPSULE ORAL 2 TIMES DAILY
Status: DISCONTINUED | OUTPATIENT
Start: 2017-05-19 | End: 2017-05-19

## 2017-05-19 RX ORDER — SODIUM CHLORIDE, SODIUM LACTATE, POTASSIUM CHLORIDE, CALCIUM CHLORIDE 600; 310; 30; 20 MG/100ML; MG/100ML; MG/100ML; MG/100ML
INJECTION, SOLUTION INTRAVENOUS CONTINUOUS PRN
Status: DISCONTINUED | OUTPATIENT
Start: 2017-05-19 | End: 2017-05-19

## 2017-05-19 RX ORDER — POTASSIUM CHLORIDE 7.45 MG/ML
10 INJECTION INTRAVENOUS
Status: DISCONTINUED | OUTPATIENT
Start: 2017-05-19 | End: 2017-05-19

## 2017-05-19 RX ORDER — DEXTROSE MONOHYDRATE, SODIUM CHLORIDE, AND POTASSIUM CHLORIDE 50; 1.49; 4.5 G/1000ML; G/1000ML; G/1000ML
INJECTION, SOLUTION INTRAVENOUS CONTINUOUS
Status: DISCONTINUED | OUTPATIENT
Start: 2017-05-19 | End: 2017-05-20

## 2017-05-19 RX ORDER — ONDANSETRON 4 MG/1
4 TABLET, ORALLY DISINTEGRATING ORAL EVERY 30 MIN PRN
Status: DISCONTINUED | OUTPATIENT
Start: 2017-05-19 | End: 2017-05-19 | Stop reason: HOSPADM

## 2017-05-19 RX ORDER — PROPOFOL 10 MG/ML
INJECTION, EMULSION INTRAVENOUS PRN
Status: DISCONTINUED | OUTPATIENT
Start: 2017-05-19 | End: 2017-05-19

## 2017-05-19 RX ORDER — OXYCODONE HCL 5 MG/5 ML
10-15 SOLUTION, ORAL ORAL EVERY 4 HOURS PRN
Status: DISCONTINUED | OUTPATIENT
Start: 2017-05-19 | End: 2017-05-20

## 2017-05-19 RX ORDER — PROCHLORPERAZINE 25 MG
12.5 SUPPOSITORY, RECTAL RECTAL EVERY 12 HOURS PRN
Status: DISCONTINUED | OUTPATIENT
Start: 2017-05-19 | End: 2017-05-25

## 2017-05-19 RX ORDER — POTASSIUM CHLORIDE 29.8 MG/ML
20 INJECTION INTRAVENOUS
Status: DISCONTINUED | OUTPATIENT
Start: 2017-05-19 | End: 2017-05-19

## 2017-05-19 RX ORDER — POTASSIUM CHLORIDE 750 MG/1
20-40 TABLET, EXTENDED RELEASE ORAL
Status: DISCONTINUED | OUTPATIENT
Start: 2017-05-19 | End: 2017-05-19

## 2017-05-19 RX ORDER — HEPARIN SODIUM 5000 [USP'U]/.5ML
5000 INJECTION, SOLUTION INTRAVENOUS; SUBCUTANEOUS EVERY 8 HOURS
Status: DISCONTINUED | OUTPATIENT
Start: 2017-05-20 | End: 2017-06-07

## 2017-05-19 RX ORDER — LABETALOL HYDROCHLORIDE 5 MG/ML
10-40 INJECTION, SOLUTION INTRAVENOUS EVERY 10 MIN PRN
Status: DISCONTINUED | OUTPATIENT
Start: 2017-05-19 | End: 2017-06-29 | Stop reason: HOSPADM

## 2017-05-19 RX ORDER — ACETAMINOPHEN 325 MG/1
650 TABLET ORAL EVERY 4 HOURS PRN
Status: DISCONTINUED | OUTPATIENT
Start: 2017-05-22 | End: 2017-05-21 | Stop reason: DRUGHIGH

## 2017-05-19 RX ORDER — HYDROMORPHONE HYDROCHLORIDE 1 MG/ML
.3-.5 INJECTION, SOLUTION INTRAMUSCULAR; INTRAVENOUS; SUBCUTANEOUS EVERY 5 MIN PRN
Status: DISCONTINUED | OUTPATIENT
Start: 2017-05-19 | End: 2017-05-19 | Stop reason: HOSPADM

## 2017-05-19 RX ORDER — PROCHLORPERAZINE MALEATE 5 MG
5 TABLET ORAL EVERY 6 HOURS PRN
Status: DISCONTINUED | OUTPATIENT
Start: 2017-05-19 | End: 2017-05-25

## 2017-05-19 RX ORDER — CEFAZOLIN SODIUM 1 G/3ML
1 INJECTION, POWDER, FOR SOLUTION INTRAMUSCULAR; INTRAVENOUS SEE ADMIN INSTRUCTIONS
Status: DISCONTINUED | OUTPATIENT
Start: 2017-05-19 | End: 2017-05-19 | Stop reason: HOSPADM

## 2017-05-19 RX ORDER — ACETAMINOPHEN 325 MG/1
975 TABLET ORAL EVERY 8 HOURS
Status: DISCONTINUED | OUTPATIENT
Start: 2017-05-19 | End: 2017-05-21

## 2017-05-19 RX ORDER — PANTOPRAZOLE SODIUM 40 MG/1
40 TABLET, DELAYED RELEASE ORAL DAILY
Status: DISCONTINUED | OUTPATIENT
Start: 2017-05-20 | End: 2017-05-20

## 2017-05-19 RX ORDER — FUROSEMIDE 10 MG/ML
20 INJECTION INTRAMUSCULAR; INTRAVENOUS ONCE
Status: COMPLETED | OUTPATIENT
Start: 2017-05-19 | End: 2017-05-19

## 2017-05-19 RX ORDER — ONDANSETRON 2 MG/ML
4 INJECTION INTRAMUSCULAR; INTRAVENOUS EVERY 30 MIN PRN
Status: DISCONTINUED | OUTPATIENT
Start: 2017-05-19 | End: 2017-05-19 | Stop reason: HOSPADM

## 2017-05-19 RX ORDER — OXYCODONE HYDROCHLORIDE 5 MG/1
5-10 TABLET ORAL EVERY 4 HOURS PRN
Status: DISCONTINUED | OUTPATIENT
Start: 2017-05-19 | End: 2017-05-19

## 2017-05-19 RX ORDER — FENTANYL CITRATE 50 UG/ML
INJECTION, SOLUTION INTRAMUSCULAR; INTRAVENOUS PRN
Status: DISCONTINUED | OUTPATIENT
Start: 2017-05-19 | End: 2017-05-19

## 2017-05-19 RX ORDER — NALOXONE HYDROCHLORIDE 0.4 MG/ML
.1-.4 INJECTION, SOLUTION INTRAMUSCULAR; INTRAVENOUS; SUBCUTANEOUS
Status: DISCONTINUED | OUTPATIENT
Start: 2017-05-19 | End: 2017-05-19

## 2017-05-19 RX ORDER — CEFAZOLIN SODIUM 2 G/100ML
2 INJECTION, SOLUTION INTRAVENOUS
Status: COMPLETED | OUTPATIENT
Start: 2017-05-19 | End: 2017-05-19

## 2017-05-19 RX ORDER — ONDANSETRON 4 MG/1
4 TABLET, ORALLY DISINTEGRATING ORAL EVERY 6 HOURS PRN
Status: DISCONTINUED | OUTPATIENT
Start: 2017-05-19 | End: 2017-05-19

## 2017-05-19 RX ORDER — ONDANSETRON 2 MG/ML
4 INJECTION INTRAMUSCULAR; INTRAVENOUS EVERY 6 HOURS PRN
Status: DISCONTINUED | OUTPATIENT
Start: 2017-05-19 | End: 2017-05-19

## 2017-05-19 RX ORDER — ALBUTEROL SULFATE 0.83 MG/ML
2.5 SOLUTION RESPIRATORY (INHALATION) EVERY 4 HOURS PRN
Status: DISCONTINUED | OUTPATIENT
Start: 2017-05-19 | End: 2017-06-29 | Stop reason: HOSPADM

## 2017-05-19 RX ORDER — SODIUM CHLORIDE, SODIUM LACTATE, POTASSIUM CHLORIDE, CALCIUM CHLORIDE 600; 310; 30; 20 MG/100ML; MG/100ML; MG/100ML; MG/100ML
INJECTION, SOLUTION INTRAVENOUS CONTINUOUS
Status: DISCONTINUED | OUTPATIENT
Start: 2017-05-19 | End: 2017-05-19 | Stop reason: HOSPADM

## 2017-05-19 RX ORDER — SODIUM CHLORIDE, SODIUM LACTATE, POTASSIUM CHLORIDE, CALCIUM CHLORIDE 600; 310; 30; 20 MG/100ML; MG/100ML; MG/100ML; MG/100ML
INJECTION, SOLUTION INTRAVENOUS CONTINUOUS
Status: DISCONTINUED | OUTPATIENT
Start: 2017-05-19 | End: 2017-05-20

## 2017-05-19 RX ORDER — FENTANYL CITRATE 50 UG/ML
25-50 INJECTION, SOLUTION INTRAMUSCULAR; INTRAVENOUS
Status: DISCONTINUED | OUTPATIENT
Start: 2017-05-19 | End: 2017-05-19 | Stop reason: HOSPADM

## 2017-05-19 RX ORDER — ONDANSETRON 2 MG/ML
INJECTION INTRAMUSCULAR; INTRAVENOUS PRN
Status: DISCONTINUED | OUTPATIENT
Start: 2017-05-19 | End: 2017-05-19

## 2017-05-19 RX ORDER — POTASSIUM CL/LIDO/0.9 % NACL 10MEQ/0.1L
10 INTRAVENOUS SOLUTION, PIGGYBACK (ML) INTRAVENOUS
Status: DISCONTINUED | OUTPATIENT
Start: 2017-05-19 | End: 2017-05-19

## 2017-05-19 RX ORDER — ALBUTEROL SULFATE 90 UG/1
4 AEROSOL, METERED RESPIRATORY (INHALATION)
Status: DISCONTINUED | OUTPATIENT
Start: 2017-05-19 | End: 2017-06-29 | Stop reason: HOSPADM

## 2017-05-19 RX ORDER — IPRATROPIUM BROMIDE AND ALBUTEROL SULFATE 2.5; .5 MG/3ML; MG/3ML
3 SOLUTION RESPIRATORY (INHALATION) 4 TIMES DAILY
Status: DISCONTINUED | OUTPATIENT
Start: 2017-05-19 | End: 2017-05-21

## 2017-05-19 RX ORDER — OXYCODONE HCL 5 MG/5 ML
5-10 SOLUTION, ORAL ORAL EVERY 4 HOURS PRN
Status: DISCONTINUED | OUTPATIENT
Start: 2017-05-19 | End: 2017-05-19

## 2017-05-19 RX ORDER — METOPROLOL TARTRATE 1 MG/ML
1-2 INJECTION, SOLUTION INTRAVENOUS EVERY 5 MIN PRN
Status: DISCONTINUED | OUTPATIENT
Start: 2017-05-19 | End: 2017-05-19 | Stop reason: HOSPADM

## 2017-05-19 RX ADMIN — PIPERACILLIN SODIUM,TAZOBACTAM SODIUM 3.38 G: 3; .375 INJECTION, POWDER, FOR SOLUTION INTRAVENOUS at 21:30

## 2017-05-19 RX ADMIN — FUROSEMIDE 20 MG: 10 INJECTION, SOLUTION INTRAVENOUS at 12:43

## 2017-05-19 RX ADMIN — HYDROMORPHONE HYDROCHLORIDE 0.5 MG: 1 INJECTION, SOLUTION INTRAMUSCULAR; INTRAVENOUS; SUBCUTANEOUS at 09:32

## 2017-05-19 RX ADMIN — SUGAMMADEX 100 MG: 100 INJECTION, SOLUTION INTRAVENOUS at 16:49

## 2017-05-19 RX ADMIN — SODIUM CHLORIDE, POTASSIUM CHLORIDE, SODIUM LACTATE AND CALCIUM CHLORIDE: 600; 310; 30; 20 INJECTION, SOLUTION INTRAVENOUS at 14:22

## 2017-05-19 RX ADMIN — FENTANYL CITRATE 25 MCG: 50 INJECTION, SOLUTION INTRAMUSCULAR; INTRAVENOUS at 15:48

## 2017-05-19 RX ADMIN — HYDROMORPHONE HYDROCHLORIDE 0.5 MG: 1 INJECTION, SOLUTION INTRAMUSCULAR; INTRAVENOUS; SUBCUTANEOUS at 17:53

## 2017-05-19 RX ADMIN — CEFAZOLIN SODIUM 2 G: 2 INJECTION, SOLUTION INTRAVENOUS at 14:55

## 2017-05-19 RX ADMIN — Medication 6 MG: at 03:11

## 2017-05-19 RX ADMIN — METOPROLOL TARTRATE 25 MG: 100 TABLET ORAL at 21:31

## 2017-05-19 RX ADMIN — HYDROMORPHONE HYDROCHLORIDE 0.5 MG: 1 INJECTION, SOLUTION INTRAMUSCULAR; INTRAVENOUS; SUBCUTANEOUS at 13:39

## 2017-05-19 RX ADMIN — HYDROMORPHONE HYDROCHLORIDE 0.5 MG: 1 INJECTION, SOLUTION INTRAMUSCULAR; INTRAVENOUS; SUBCUTANEOUS at 18:16

## 2017-05-19 RX ADMIN — FENTANYL CITRATE 50 MCG: 50 INJECTION, SOLUTION INTRAMUSCULAR; INTRAVENOUS at 18:49

## 2017-05-19 RX ADMIN — HYDROMORPHONE HYDROCHLORIDE 0.2 MG: 1 INJECTION, SOLUTION INTRAMUSCULAR; INTRAVENOUS; SUBCUTANEOUS at 17:24

## 2017-05-19 RX ADMIN — PIPERACILLIN SODIUM,TAZOBACTAM SODIUM 3.38 G: 3; .375 INJECTION, POWDER, FOR SOLUTION INTRAVENOUS at 15:17

## 2017-05-19 RX ADMIN — HYDROMORPHONE HYDROCHLORIDE 0.5 MG: 1 INJECTION, SOLUTION INTRAMUSCULAR; INTRAVENOUS; SUBCUTANEOUS at 17:33

## 2017-05-19 RX ADMIN — HYDROMORPHONE HYDROCHLORIDE 0.3 MG: 1 INJECTION, SOLUTION INTRAMUSCULAR; INTRAVENOUS; SUBCUTANEOUS at 17:16

## 2017-05-19 RX ADMIN — FENTANYL CITRATE 25 MCG: 50 INJECTION, SOLUTION INTRAMUSCULAR; INTRAVENOUS at 18:44

## 2017-05-19 RX ADMIN — IPRATROPIUM BROMIDE AND ALBUTEROL SULFATE 3 ML: .5; 3 SOLUTION RESPIRATORY (INHALATION) at 21:38

## 2017-05-19 RX ADMIN — FENTANYL CITRATE 50 MCG: 50 INJECTION, SOLUTION INTRAMUSCULAR; INTRAVENOUS at 17:08

## 2017-05-19 RX ADMIN — LOPERAMIDE HYDROCHLORIDE 4 MG: 1 SOLUTION ORAL at 10:32

## 2017-05-19 RX ADMIN — FENTANYL CITRATE 25 MCG: 50 INJECTION, SOLUTION INTRAMUSCULAR; INTRAVENOUS at 18:39

## 2017-05-19 RX ADMIN — OXYCODONE HYDROCHLORIDE 5 MG: 5 SOLUTION ORAL at 04:34

## 2017-05-19 RX ADMIN — PROPOFOL 70 MG: 10 INJECTION, EMULSION INTRAVENOUS at 14:34

## 2017-05-19 RX ADMIN — ACETAMINOPHEN 975 MG: 325 TABLET, FILM COATED ORAL at 21:30

## 2017-05-19 RX ADMIN — GABAPENTIN 300 MG: 250 SOLUTION ORAL at 04:34

## 2017-05-19 RX ADMIN — FENTANYL CITRATE 50 MCG: 50 INJECTION, SOLUTION INTRAMUSCULAR; INTRAVENOUS at 14:34

## 2017-05-19 RX ADMIN — FENTANYL CITRATE 50 MCG: 50 INJECTION, SOLUTION INTRAMUSCULAR; INTRAVENOUS at 15:54

## 2017-05-19 RX ADMIN — TRAZODONE HYDROCHLORIDE 50 MG: 50 TABLET ORAL at 21:30

## 2017-05-19 RX ADMIN — FLUCONAZOLE 200 MG: 2 INJECTION INTRAVENOUS at 23:14

## 2017-05-19 RX ADMIN — MIDAZOLAM HYDROCHLORIDE 2 MG: 1 INJECTION, SOLUTION INTRAMUSCULAR; INTRAVENOUS at 14:22

## 2017-05-19 RX ADMIN — Medication 6 MG: at 10:32

## 2017-05-19 RX ADMIN — GABAPENTIN 300 MG: 250 SOLUTION ORAL at 21:30

## 2017-05-19 RX ADMIN — ROCURONIUM BROMIDE 10 MG: 10 INJECTION INTRAVENOUS at 15:48

## 2017-05-19 RX ADMIN — SODIUM CHLORIDE, POTASSIUM CHLORIDE, SODIUM LACTATE AND CALCIUM CHLORIDE: 600; 310; 30; 20 INJECTION, SOLUTION INTRAVENOUS at 18:00

## 2017-05-19 RX ADMIN — HYDROMORPHONE HYDROCHLORIDE: 10 INJECTION, SOLUTION INTRAMUSCULAR; INTRAVENOUS; SUBCUTANEOUS at 18:05

## 2017-05-19 RX ADMIN — PHENYLEPHRINE HYDROCHLORIDE 200 MCG: 10 INJECTION, SOLUTION INTRAMUSCULAR; INTRAVENOUS; SUBCUTANEOUS at 14:55

## 2017-05-19 RX ADMIN — PHYTONADIONE 10 MG: 10 INJECTION, EMULSION INTRAMUSCULAR; INTRAVENOUS; SUBCUTANEOUS at 18:20

## 2017-05-19 RX ADMIN — OXYCODONE HYDROCHLORIDE 10 MG: 5 SOLUTION ORAL at 10:32

## 2017-05-19 RX ADMIN — Medication 5 ML: at 23:14

## 2017-05-19 RX ADMIN — LOPERAMIDE HYDROCHLORIDE 4 MG: 1 SOLUTION ORAL at 21:30

## 2017-05-19 RX ADMIN — PIPERACILLIN SODIUM,TAZOBACTAM SODIUM 3.38 G: 3; .375 INJECTION, POWDER, FOR SOLUTION INTRAVENOUS at 04:32

## 2017-05-19 RX ADMIN — Medication 6 MG: at 21:30

## 2017-05-19 RX ADMIN — SUCCINYLCHOLINE CHLORIDE 80 MG: 20 INJECTION, SOLUTION INTRAMUSCULAR; INTRAVENOUS at 14:34

## 2017-05-19 RX ADMIN — FENTANYL CITRATE 25 MCG: 50 INJECTION, SOLUTION INTRAMUSCULAR; INTRAVENOUS at 15:47

## 2017-05-19 RX ADMIN — FENTANYL CITRATE 50 MCG: 50 INJECTION, SOLUTION INTRAMUSCULAR; INTRAVENOUS at 16:59

## 2017-05-19 RX ADMIN — ROCURONIUM BROMIDE 30 MG: 10 INJECTION INTRAVENOUS at 14:50

## 2017-05-19 RX ADMIN — ONDANSETRON 4 MG: 2 INJECTION INTRAMUSCULAR; INTRAVENOUS at 16:29

## 2017-05-19 ASSESSMENT — LIFESTYLE VARIABLES: TOBACCO_USE: 1

## 2017-05-19 ASSESSMENT — PAIN DESCRIPTION - DESCRIPTORS
DESCRIPTORS: ACHING
DESCRIPTORS: ACHING

## 2017-05-19 NOTE — OR NURSING
Dr. Rutledge at bedside. Reviewed CXR and Right IJ okay to use.  Patient still with intolerable chest pain, but able to doze in between. Dr. Rutledge at bedside.  Will continue to admin pain meds per order.

## 2017-05-19 NOTE — PROGRESS NOTES
Thoracic Surgery Daily Progress Note  Minerva Blanco  05/19/2017    Subjective:   Admitted overnight from clinic. No acute events since admission. Started on IV antibiotics. Plan for OR this AM.     Objective:    /43 (BP Location: Right arm)  Pulse 82  Temp 99.7  F (37.6  C) (Oral)  Resp 16  Ht 1.524 m (5')  Wt 42.7 kg (94 lb 3.2 oz)  SpO2 98%  BMI 18.4 kg/m2  Laying comfortably in bed in no acute distress  Awake, alert and appropriate  Nonlabored breathing on room air  Regular rate and rhythm  Incisional wound packed, scant drainage  Extremities warm, well perfused, not edematous.       Ins/Outs:   I/O last 3 completed shifts:  In: 864.17 [P.O.:200; I.V.:664.17]  Out: 200 [Stool:200]    Labs:   Reviewed, Leukocytosis improving this AM to 15.3.   INR on admission 4.1    Recent Imaging:   CT scan reviewed.     Assessment:  Minerva Blanco is a 72 year old female with complex past surgical history most recently status post a retrosternal gastric pull through, manubrium resection, spit fistula takedown, J-tube placement now with a chest wound abscess. Admitted for IV antibiotics and better source control in OR.    Continue IV antibiotics  OR later today  Reverse INR with FFP, recheck after 2of4 units.   Lasix x1 after 2 units FFP  Remainder of plan pending intra-operative findings.     Seen and discussed with fellow, who discussed with staff.     Viki Carmen MD  General Surgery  Pager: [phone]

## 2017-05-19 NOTE — ANESTHESIA CARE TRANSFER NOTE
Patient: Minerva Blanco    Procedure(s):  Upper Endoscopy, Irrigation and Debridement of Chest Wound  - Wound Class: II-Clean Contaminated   - Wound Class: I-Clean    Diagnosis: Neck Abscess   Diagnosis Additional Information: No value filed.    Anesthesia Type:   General, RSI, ETT     Note:  Airway :Face Mask  Patient transferred to:PACU  Comments: Anesthesia Care Transfer Note    Patient: Minerva Blanco    Transferred to: PACU    Patient vital signs: stable    Airway: none    Monitors on, VSS, pt. Stable, Report given to PACU RN.     Narendra Zaldivar CRNA  5/19/2017 5:09 PM            Vitals: (Last set prior to Anesthesia Care Transfer)    CRNA VITALS  5/19/2017 1630 - 5/19/2017 1709      5/19/2017             Resp Rate (set): 10    EKG: NSR                Electronically Signed By: CLIVE Roche CRNA  May 19, 2017  5:09 PM

## 2017-05-19 NOTE — ANESTHESIA POSTPROCEDURE EVALUATION
Patient: Minerva Blanco    Procedure(s):  Upper Endoscopy, Irrigation and Debridement of Chest Wound  - Wound Class: II-Clean Contaminated   - Wound Class: I-Clean    Diagnosis:Neck Abscess   Diagnosis Additional Information: No value filed.    Anesthesia Type:  General, RSI, ETT    Note:  Anesthesia Post Evaluation    Patient location during evaluation: PACU  Patient participation: Able to fully participate in evaluation  Level of consciousness: awake  Pain management: adequate  Airway patency: patent  Cardiovascular status: acceptable  Respiratory status: acceptable  Hydration status: acceptable  PONV: none     Anesthetic complications: None          Last vitals:  Vitals:    05/19/17 1330 05/19/17 1400 05/19/17 1702   BP: 131/81 129/78 143/69   Pulse: 90 92    Resp: 18 18 20   Temp: 37  C (98.6  F)  36.3  C (97.3  F)   SpO2: 98% 96%          Electronically Signed By: Sapphire Rutledge MD  May 19, 2017  5:38 PM

## 2017-05-19 NOTE — PROGRESS NOTES
Surgery Cross Cover Brief Note  07:30 PM 5/18/2017       Subjective  Ms. Blanoc is a 72 year-old female with a history of leak s/p Toupet Fundoplication, s/p multiple procedures, ultimately esophagectomy and spit fistula 1/2017, now s/p lap retrosternal gastric pull through, resection of left half of manubrium, spit fistula takedown, J tube, and pharyngostomy placement 4/17 with pharyngostomy replacement 4/12. She was seen in clinic today with concerns for a sternal abscess. Patient says she has noticed drainage coming out of the wound, totaling about one cup over the last few days. Denies fevers and chills. No chest pain or difficulty breathing.    Objective  /55 (BP Location: Right arm)  Pulse 86  Temp 98.4  F (36.9  C) (Oral)  Resp 16  Ht 1.524 m (5')  Wt 42.7 kg (94 lb 3.2 oz)  SpO2 99%  BMI 18.4 kg/m2    Exam:  Gen: NAD, A&O x3  Cardiac: RRR, peripheral pulses +2  Respi: NLB on RA  Chest: open wound sternal wound, packed; spit fistula site healing well  Abd: soft, non-tender, non-distended    Relevant Labs and Imaging  IMPRESSION:   1. Postoperative changes of esophagectomy with retrosternal gastric  pull-through. There is concern for esophageal perforation at the site  of the anastomosis, with an adjacent air and fluid collection and  inflammatory changes throughout the anterior mediastinum, highly  concerning for abscess.  2. Multifocal areas of consolidation and groundglass opacity are  unchanged in the right lung, but improved in the left lower lobe, and  are again concerning for infection.      Assessment / Plan  72F now s/p lap retrosternal gastric pull through, resection of left half of manubrium, spit fistula takedown, J tube, and pharyngostomy placement admitted with concerns for retrosternal abscess.      Admit to thoracic surgery    Zosyn and fluconazole    Pain control    OR in the morning for wash-out      - - - - - - - - - - - -  Jd Garcia MD  PGY-1, General  Surgery  [facility]  Pager: [phone]  Please contact primary team after 6am.

## 2017-05-19 NOTE — OR NURSING
Called 7B to receive a preop report.  Informed that pt's INR was 4.1 at 2015 last PM.  4 units FFP ordered; pt on second bag.  Called OR Control Desk to inform them of INR, transferred to OR 10 to inform Dr Wise.  Advised that Dr Wise would like pt in preop STAT and to proceed.  MOISÉS Palmer, notified of issue.

## 2017-05-19 NOTE — OR NURSING
1335: Per Dr. Maty toro to give floor order for IV dilaudid.  1400: Per Anesthesia resident plan is to finish 3rd bag of plasma, start the 4th, then plan to go back to OR.  Also, updated pt didn't received metoprolol dose, MDA updated, no new order to give.

## 2017-05-19 NOTE — ANESTHESIA PREPROCEDURE EVALUATION
Anesthesia Evaluation     . Pt has had prior anesthetic. Type: General and MAC    No history of anesthetic complications          ROS/MED HX    ENT/Pulmonary:     (+)tobacco use, Current use 1 packs/day  , . .    Neurologic: Comment: Left pinpoint pupil associated with MS    (+)Multiple Sclerosis limitations: heat and noise sensitivity,     Cardiovascular:     (+) ----. : . . . :. . Previous cardiac testing Echodate:1/9/2017results:Technically difficult study.  Global and regional left ventricular function is hyperdynamic with an LVEF  >70%.  The right ventricular function is hyperdynamic.  Mild pulmonary hypertension is present.  The inferior vena cava is normal. The estimated mean right atrial pressure is  <3 mmHg.  No pericardial effusion is present.  Previous study not available for comparison.date: results:ECG reviewed date:2/22/2017 results:Sinus rizwan with left atrial enlargement date: results:          METS/Exercise Tolerance:  3 - Able to walk 1-2 blocks without stopping   Hematologic:     (+) Anemia, History of Transfusion no previous transfusion reaction -     (-) history of blood clots   Musculoskeletal: Comment: Fibromyalgia - generalized body aches  (+) arthritis, , , -       GI/Hepatic: Comment: History of hiatal hernia w/ associated GERD now s/p surgery.  GERD resolved    S/p esophagectomy    (+) GERD (asymptomatic currently, stopped protonix 3 months ago) Other, Inflammatory bowel disease, Other GI/Hepatic chronic esophageal perforation      Renal/Genitourinary:  - ROS Renal section negative       Endo:  - neg endo ROS       Psychiatric:  - neg psychiatric ROS       Infectious Disease:  - neg infectious disease ROS       Malignancy:   (+) Malignancy History of Breast  Breast CA Remission status post Surgery, Chemo and Radiation.         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  No vision impairment now.   (+) No chance of pregnancy C-spine cleared:  N/A, H/O Chronic Pain,H/O chronic opiod use , no other significant disability                    Physical Exam  Normal systems: dental    Airway   Mallampati: I  TM distance: >3 FB  Neck ROM: full    Dental     Cardiovascular   Rhythm and rate: regular and normal      Pulmonary    breath sounds clear to auscultation                    Anesthesia Plan      History & Physical Review  History and physical reviewed and following examination; no interval change.    ASA Status:  3 .        Plan for General, RSI and ETT with Intravenous induction. Maintenance will be Balanced.    PONV prophylaxis:  Ondansetron (or other 5HT-3)  Additional equipment: 2nd IV      Postoperative Care  Postoperative pain management:  IV analgesics.      Consents  Anesthetic plan, risks, benefits and alternatives discussed with:  Patient..        ANESTHESIA PREOP EVALUATION    PROCEDURE: Procedure(s):  Incision and Drainage of Sternal Wound Abscess  - Wound Class:     HPI: Minerva Blanco is a 72 year old female with retrosternal abscess s/p pharyngostomy and spit fistula for chronic esophageal perforation who presents for the above procedure.     PAST MEDICAL HISTORY:  Past Medical History:   Diagnosis Date     Atrial fibrillation (H)      Breast cancer (H) 2007    right side - lumpectomy, chemo, and local radiation     Esophageal perforation      Fibromyalgia      GERD (gastroesophageal reflux disease)      Hiatal hernia      History of blood transfusion      IBS (irritable bowel syndrome)      Meniere's disease     deaf left ear     MS (multiple sclerosis) (H)        PAST SURGICAL HISTORY:  Past Surgical History:   Procedure Laterality Date     APPENDECTOMY       BACK SURGERY  5/1/2015    lumbar fusion     BRONCHOSCOPY FLEXIBLE N/A 4/17/2017    Procedure: BRONCHOSCOPY FLEXIBLE;;  Surgeon: Jens Wise MD;  Location: [facility]     C GASTROSTOMY/JEJUN TUBE      J tube for feedings     CLOSE SPIT FISTULA N/A 4/17/2017    Procedure:  CLOSE SPIT FISTULA;;  Surgeon: Jens Wise MD;  [facility]     COMBINED ESOPHAGOSCOPY, GASTROSCOPY, DUODENOSCOPY (EGD), REMOVE ESOPHAGEAL STENT N/A 8/26/2016    Procedure: COMBINED ESOPHAGOSCOPY, GASTROSCOPY, DUODENOSCOPY (EGD), REMOVE ESOPHAGEAL STENT;  Surgeon: Jens Wise MD;  [facility]     CREATE SPIT FISTULA N/A 1/9/2017    Procedure: CREATE SPIT FISTULA;  Surgeon: Jens Wise MD;  [facility]     ESOPHAGECTOMY N/A 1/9/2017    Procedure: ESOPHAGECTOMY;  Surgeon: Jens Wise MD;  [facility]     ESOPHAGOSCOPY, GASTROSCOPY, DUODENOSCOPY (EGD), COMBINED N/A 4/18/2016    Procedure: COMBINED ESOPHAGOSCOPY, GASTROSCOPY, DUODENOSCOPY (EGD);  Surgeon: Alexis Barraza MD;  [facility]     ESOPHAGOSCOPY, GASTROSCOPY, DUODENOSCOPY (EGD), COMBINED N/A 4/25/2016    Procedure: COMBINED ESOPHAGOSCOPY, GASTROSCOPY, DUODENOSCOPY (EGD);  Surgeon: Alexis Barraza MD;  [facility]     ESOPHAGOSCOPY, GASTROSCOPY, DUODENOSCOPY (EGD), COMBINED N/A 5/4/2016    Procedure: COMBINED ESOPHAGOSCOPY, GASTROSCOPY, DUODENOSCOPY (EGD);  Surgeon: Alexis Barraza MD;  [facility]     ESOPHAGOSCOPY, GASTROSCOPY, DUODENOSCOPY (EGD), COMBINED N/A 5/18/2016    Procedure: COMBINED ESOPHAGOSCOPY, GASTROSCOPY, DUODENOSCOPY (EGD);  Surgeon: Alexis Barraza MD;  [facility]     ESOPHAGOSCOPY, GASTROSCOPY, DUODENOSCOPY (EGD), COMBINED N/A 6/22/2016    Procedure: COMBINED ESOPHAGOSCOPY, GASTROSCOPY, DUODENOSCOPY (EGD);  Surgeon: Alexis Barraza MD;  [facility]     ESOPHAGOSCOPY, GASTROSCOPY, DUODENOSCOPY (EGD), COMBINED N/A 7/12/2016    Procedure: COMBINED ESOPHAGOSCOPY, GASTROSCOPY, DUODENOSCOPY (EGD);  Surgeon: Jens Wise MD;  [facility]     ESOPHAGOSCOPY, GASTROSCOPY, DUODENOSCOPY (EGD), COMBINED N/A 4/21/2017    Procedure: COMBINED ESOPHAGOSCOPY, GASTROSCOPY, DUODENOSCOPY (EGD);   Esophagogastroduodenoscopy, Pharyngostomy Tube Placement ;  Surgeon: Jens Wise MD;  [facility]     ESOPHAGOSCOPY, GASTROSCOPY, DUODENOSCOPY (EGD), DILATATION, COMBINED N/A 6/29/2016    Procedure: COMBINED ESOPHAGOSCOPY, GASTROSCOPY, DUODENOSCOPY (EGD), DILATATION;  Surgeon: Jens Wise MD;  [facility]     HC UGI ENDOSCOPY W TRANSENDOSCOPIC STENT PLACEMENT N/A 7/12/2016    Procedure: COMBINED ESOPHAGOSCOPY, GASTROSCOPY, DUODENOSCOPY (EGD), PLACE TRANSENDOSCOPIC ESOPHAGEAL STENT;  Surgeon: Jens Wise MD;  [facility]     HC UGI ENDOSCOPY W TRANSENDOSCOPIC STENT PLACEMENT N/A 7/22/2016    Procedure: COMBINED ESOPHAGOSCOPY, GASTROSCOPY, DUODENOSCOPY (EGD), PLACE TRANSENDOSCOPIC ESOPHAGEAL STENT;  Surgeon: Jens Wise MD;  [facility]     IRRIGATION AND DEBRIDEMENT CHEST WASHOUT, COMBINED N/A 6/29/2016    Procedure: COMBINED IRRIGATION AND DEBRIDEMENT CHEST WASHOUT;  Surgeon: Jens Wise MD;  [facility]     LAPAROSCOPIC ASSISTED INSERTION TUBE JEJUNOSTOMY N/A 4/17/2017    Procedure: LAPAROSCOPIC ASSISTED INSERTION TUBE JEJUNOSTOMY;;  Surgeon: Jens Wise MD;  [facility]     LAPAROSCOPY DIAGNOSTIC (GENERAL) N/A 1/9/2017    Procedure: LAPAROSCOPY DIAGNOSTIC (GENERAL);  Surgeon: Jens Wise MD;  [facility]     LAPAROSCOPY DIAGNOSTIC (GENERAL) N/A 4/17/2017    Procedure: LAPAROSCOPY DIAGNOSTIC (GENERAL);  Laparoscopic , Neck Dissection, Spit Fistula Takedown, Laparoscopic Jejunostomy Tube and Pharyngostomy Tube, Gastric Pull up, Upper Endoscopy(EGD)  , Flexible Bronchoscopy ,Sternotomy ;  Surgeon: Jens Wise MD;  [facility]     LUMPECTOMY BREAST Right 2007     NERVE BLOCK PERIPHERAL N/A 8/30/2016    Procedure: NERVE BLOCK PERIPHERAL;  Surgeon: GENERIC ANESTHESIA PROVIDER;  [facility]     NISSEN FUNDOPLICATION  1/2016     PHARYNGOSTOMY N/A 4/18/2016    Procedure:  "PHARYNGOSTOMY;  Surgeon: Alexis Barraza MD;  [facility]     PHARYNGOSTOMY N/A 4/25/2016    Procedure: PHARYNGOSTOMY;  Surgeon: Alexis Barraza MD;  [facility]     PHARYNGOSTOMY N/A 5/4/2016    Procedure: PHARYNGOSTOMY;  Surgeon: Alexis Barraza MD;  [facility]     PHARYNGOSTOMY N/A 6/22/2016    Procedure: PHARYNGOSTOMY;  Surgeon: Alexis Barraza MD;  [facility]     PHARYNGOSTOMY N/A 6/29/2016    Procedure: PHARYNGOSTOMY;  Surgeon: Jens Wise MD;  [facility]     PHARYNGOSTOMY N/A 4/21/2017    Procedure: PHARYNGOSTOMY;;  Surgeon: Jens Wise MD;  [facility]     PICC INSERTION Left 8/25/2016    5fr DL BioFlo PICC, 42cm (3cm external) in the L medial brachial vein w/ tip in the SVC RA junction.     THORACOTOMY Right 1998    lung infection - \"hard crust formed on lung\"     THORACOTOMY Left 1/9/2017    Procedure: THORACOTOMY;  Surgeon: Jens Wise MD;  [facility]     WRIST SURGERY         SOCIAL HISTORY:   Social History   Substance Use Topics     Smoking status: Former Smoker     Packs/day: 1.00     Years: 15.00     Types: Cigarettes     Quit date: 1/1/1998     Smokeless tobacco: Not on file     Alcohol use No       ALLERGIES:   Allergies   Allergen Reactions     Amoxicillin Diarrhea     Ativan [Lorazepam] Other (See Comments)     Hallucinations     Hydromorphone Itching     4/12/17 - patient open to using this as she tolerated Hydromorphone PCA during hospitalization in January 2017.      Morphine Itching       MEDICATIONS:  Prescriptions Prior to Admission   Medication Sig Dispense Refill Last Dose     oxyCODONE (ROXICODONE) 10 MG IR tablet Take 1 to 1.5 tablet every 3-4 hours PRN pain 60 tablet 0 5/18/2017 at 1200     diphenoxylate-atropine (LOMOTIL) 0.5-0.005 mg/mL liquid Take 5 mLs by mouth 4 times daily as needed for diarrhea 300 mL 0 5/18/2017 at 0800     fiber modular (NUTRISOURCE FIBER) " packet 1 packet by Per Feeding Tube route 3 times daily (with meals) 60 packet 0 Unknown at Unknown time     metoprolol (LOPRESSOR) 10 mg/mL SUSP 2.5 mLs (25 mg) by Per J Tube route 2 times daily   5/18/2017 at 0800     opium tincture 10 mg/mL (1%) liquid Take 0.6 mLs (6 mg) by mouth every 6 hours 118 mL 0 5/18/2017 at 0800     ferrous sulfate 300 (60 FE) MG/5ML syrup 5 mLs (300 mg) by Per J Tube route daily 150 mL 0 5/17/2017 at 2000     gabapentin (NEURONTIN) 250 MG/5ML solution Take 6 mLs (300 mg) by mouth every 8 hours 450 mL 0 5/18/2017 at 0800     traZODone (DESYREL) 100 MG tablet 0.5 tablets (50 mg) by Per G Tube route At Bedtime (Patient taking differently: 100 mg by Per G Tube route At Bedtime ) 30 tablet 0 5/18/2017 at 2200     simethicone (MYLICON) 40 MG/0.6ML suspension 0.6 mLs (40 mg) by Per Feeding Tube route every 6 hours as needed for cramping 30 mL 0 5/18/2017 at 0800     ranitidine (ZANTAC) 150 MG/10ML syrup Take 10 mLs (150 mg) by mouth 2 times daily 600 mL 0 5/18/2017 at 0800     warfarin (COUMADIN) 2.5 MG tablet Take 1 tablet (2.5 mg) by mouth daily (Patient not taking: Reported on 5/18/2017) 30 tablet 1 Unknown at Unknown time     order for DME Equipment being ordered:   [facility] Suction Machine-Intermittent  Suction Canisters(2)  Suction Canister Holders(2)  Suction Connect Tube(2)  5 in 1 Connector(2)  Bacteria Filter(2)  Yaunkauer Suction(2)    Treatment Diagnosis: Esophogeal Perforation, s/p esophagectomy 1 Device 0 Taking     order for DME Equipment being ordered:   Suction Pump  Suction Canister(2)  Suction Tubing(2)  Bacteria Filter(2)  Yaunkauer(4)  Red Rubber Catheter(2)    Treatment Diagnosis: Esophogeal Perforation, s/p esophagectomy 1 Device 0 Taking     DiphenhydrAMINE HCl (BENADRYL PO) Take 25 mg by mouth nightly as needed   5/17/2017 at 2000     cholestyramine (QUESTRAN) 4 G Packet Take 1 packet by mouth daily   5/18/2017 at 0800     multivitamins with minerals (CERTAVITE/CEROVITE)  LIQD liquid Take 15 mLs by mouth daily   5/17/2017 at 0800     loperamide (IMODIUM) 1 MG/5ML liquid Take 20 mLs (4 mg) by mouth 4 times daily as needed for diarrhea (Patient taking differently: Take 4 mg by mouth 2 times daily ) 200 mL  5/18/2017 at 0800     Lactobacillus Rhamnosus, GG, (CULTURELLE PO) Take 1 tablet by mouth 2 times daily    5/18/2017 at 0800       NPO STATUS: NPO after midnight.  LABS:    BMP:  Recent Labs   Lab Test  05/18/17 2015   NA  132*   POTASSIUM  3.4   CHLORIDE  97   CO2  23   BUN  8   CR  0.45*   GLC  64*   ANNABELLE  8.9       LFTs:   Recent Labs   Lab Test  04/17/17   0653   PROTTOTAL  8.6   ALBUMIN  3.6   BILITOTAL  0.8   ALKPHOS  283*   AST  38   ALT  56*       CBC:   Recent Labs   Lab Test  05/18/17 2015   WBC  21.3*   RBC  3.45*   HGB  9.4*   HCT  29.3*   MCV  85   MCH  27.2   MCHC  32.1   RDW  16.0*   PLT  678*       Coags:  Recent Labs   Lab Test  05/18/17 2015   INR  4.10*   PTT  68*       IMAGING AND STUDIES:     ASSESSMENT & PLAN:  - ASA 3  - GETA with standard ASA monitors, IV induction, RSI, and balanced anesthetic  - PIV x2  - Antibiotics per surgery  - PONV prophylaxis  - Blood products available, possible administration discussed with patient  - Relevant risks, benefits, alternatives and the anesthetic plan was discussed.  All questions were answered and there was agreement to proceed.      France Gee CA1  pager [phone]      I have seen and evaluated the patient myself and agree with the above assessment and plan.  I have explained the risks/benefits of anesthesia to the patient who understands and agrees to proceed.    Sapphire Rutledge MD  Staff Anesthesiologist  Pager [phone]

## 2017-05-19 NOTE — PROGRESS NOTES
[facility]  Patient is currently open to home care services with [facility].  The patient is currently receiving RN services.  [facility]  and team have been notified of patient admission.  [facility] liaison will continue to follow patient during stay.  If appropriate provide orders to resume home care at time of discharge.    Asiya Vines RN  [facility] Liaison

## 2017-05-19 NOTE — PROGRESS NOTES
Care Coordinator Progress Note     Admission Date/Time:  5/18/2017  Attending MD:  Nalini Barreto MD     Data  Chart reviewed, discussed with interdisciplinary team.   Patient was admitted for:    Abscess  Esophageal perforation  History of esophagectomy.    Concerns with insurance coverage for discharge needs: None.  Current Living Situation: Patient lives with spouse.  Support System: Supportive and Involved    Enrique is primary care giver  Services Involved: Home Care and Home Infusion  Transportation: Family or Friend will provide  Barriers to Discharge: chronically ill          Assessment  Patient is a 72 year old female with complex past surgical history most recently s/p retrosternal gastric pull through, manubrium resection, spit fistula takedown, J tube placement now with chest wound abscess.  Per team rounds plan is for patient to go to the OR today for a wash out of chest wound abscess.  Per chart review patient is open to [facility] for skilled nursing visits and [facility] for enteral nutrition.  Resumption orders placed.  Patient currently on IV abx, unsure if patient will need them at discharge.  CC will continue to follow and assist with discharge planning as needed.            Plan  Anticipated Discharge Date: TBD  Anticipated Discharge Plan:  Discharge to home with resumption of [facility] and [facility].      Sarina Orellana, ROMAINECC  [phone]

## 2017-05-19 NOTE — PROGRESS NOTES
Patient arrived at 1900. Alert and oriented X4. Mid chest wound dressing moist with purulent drainage. Dried wound 3x3cm by the left of the mid chest wound/ no drainage. Sat is 98%RA. Ambulatory within the room.  by the bedside. Reviewed plan of care with patient. Voiding adequately. Blood sugar currently < 100. Remain asymptomatic. Plan for debridement of chest wound tomorrow. Continue with plan of care.

## 2017-05-19 NOTE — OR NURSING
Notified from lab of critical value of Hgb 6.0. Paged and discussed with Dr. Garcia. Will redraw repeat Hgb now.     Again discussed with Dr. Garcia and will start 1 unit PRBC in PACU, unit released.

## 2017-05-19 NOTE — ANESTHESIA PROCEDURE NOTES
Central Line Procedure Note  Staff:     Anesthesiologist:  JEFFREY SPENCER  Location: In OR after induction  Procedure Start/Stop Times:     patient identified, IV checked, risks and benefits discussed, informed consent, monitors and equipment checked, pre-op evaluation and at physician/surgeon's request      Correct Patient: Yes      Correct Position: Yes      Correct Site: Yes      Correct Procedure: Yes      Correct Laterality:  Yes    Site Marked:  N/A  Line Placement:     Procedure:  Central Line    Insertion laterality:  Right    Insertion site:  Internal Jugular    Position:  Trendelenburg      Maximal Sterile Barriers: All elements of maximal sterile barrier technique followed      (Maximal sterile barriers include:   Sterile gown, Sterile Gloves, Mask, Cap, Whole body draped, hand hygiene and acceptable skin prep).Skin Prep: Chloraprep         Injection Technique:  Ultrasound guided    Sterile Ultrasound Technique:  Sterile probe cover and Sterile gel    Vein evaluated via U/S for patency/adequacy of catheter insertion and is adequate.  Using realtime U/S imaging the vein was punctured, and needle was observed entering vein on U/S      A permanent image is NOT entered into the patient's record.      Local skin infiltration:  None    Catheter size:  7 Fr, 3 lumen, 20 cm    Catheter length at skin (cm):  15    Cath secured with: suture      Dressing:  Tegaderm and Biopatch    Complications:  None obvious    Blood aspirated all lumens: Yes      All Lumens Flushed: Yes      Verification method:  Placement to be verified post-op

## 2017-05-19 NOTE — BRIEF OP NOTE
[facility], [place]    Brief Operative Note    Pre-operative diagnosis: Neck Abscess   Post-operative diagnosis Anastomotic leak, substernal abscess  Procedure: Procedure(s):  Upper Endoscopy, Irrigation and Debridement of Chest Wound  - Wound Class: II-Clean Contaminated   - Wound Class: I-Clean  Surgeon: Surgeon(s) and Role:     * Nalini Barreto MD - Primary     * Jens Wise MD - Assisting     * Storm Whitlock MD - Assisting  Anesthesia: General   Estimated blood loss: 200mL  Drains: Substernal chest tube  Specimens: none  Findings:   Anastomotic leak and substernal abscess cavity  Complications: none  Implants: none

## 2017-05-19 NOTE — PLAN OF CARE
Problem: Individualization  Goal: Patient Preferences  Multiple stool/watery, green brown. Alternating hydromorphone and oxycodone for pain. Imodium and lomotil all administered. Currently resting in bed. Mid chest wound site remain moist with small purulent drainage. Voiding adequately. Jtube is clamped. Chlorhexidine wipes initiated. Continue with plan of care.

## 2017-05-19 NOTE — PROGRESS NOTES
"CLINICAL NUTRITION SERVICES - ASSESSMENT NOTE     Nutrition Prescription    RECOMMENDATIONS FOR MDs/PROVIDERS TO ORDER:  If providers would like RD to order TF, order a \"RD to assess and order TF\" consult.     1. Pending status of lytes  (K+/Mg WNL and Phos >2.0), recommend initiation of home TF regimen via J-tube with Peptamen 1.5 @ 25 mL/hr + Nutrisource Fiber TID.  Pending tolerance and signs of refeeding syndrome (K+/Mg WNL and Phos >2.0) advance by 10 mL q 4 hrs to goal of 45 mL/hr.    2. Order daily check of K+, Mg, Phos until TF advances to goal rate for evaluation of refeeding syndrome and need for lyte replacement.     3. Order H2O flushes of 60 mL q 4 hrs (360 mL + TF =  mL/day)    Malnutrition Status:    Severe malnutrition in the context of chronic illness     Recommendations already ordered by Registered Dietitian (RD):  None at this time    Future/Additional Recommendations:  Once diet adv >NPO, order appropriate oral nutrition supplements        REASON FOR ASSESSMENT  Minerva Blanco is a 72 year old female assessed by the dietitian for Admission Nutrition Risk Screen for tube feeding or parenteral nutrition    NUTRITION HISTORY  Pt recently admitted 5/3-5/9. Pt dependent on TF and is followed by [facility]. Attempted to contact [facility] RD but was unable to reach this afternoon.     Able to obtain brief nutrition hx per pt during transfer to OR:   Pt receives TF via J-tube with Peptamen 1.5 @ 45 mL/hr (goal) continuous + Nutrisource Fiber TID. This goal regimen provides (1080 ml/day) to provide 1620 kcals (38 kcal/kg), 73 g PRO (1.7 gm Pro/kg) via TF + x3 pkts Nutrisource fiber (15 kcal and 3 gm fiber each), meeting 100% assessed kcal and protein needs per dosing wt of 42 kg.    However, pt states she \"always\" experiences diarrhea when infusing at this rate, but is better able to tolerate @ 35 mL/hr (meets 84% minimum kcal needs, 87% minimum protein needs). Pt reports last TF infusion was one day ago " "(5/17).    CURRENT NUTRITION ORDERS  Diet: NPO for procedure    LABS  Labs reviewed     MEDICATIONS  Medications reviewed     ANTHROPOMETRICS  Height: 152.4 cm (5' 0\")  Most Recent Weight: 42.7 kg (94 lb 3.2 oz)    IBW: 45 kg  BMI: Underweight BMI <18.5  Weight History: Per records, appears pt down 1.5 kg (3%) in past ~2.5 weeks. Suspect weights >100# are inaccurate as they are outliers in the data set.   Wt Readings from Last 10 Encounters:   05/18/17 42.7 kg (94 lb 3.2 oz)   05/18/17 42.9 kg (94 lb 8 oz)   05/07/17 47.9 kg (105 lb 11.2 oz)   05/03/17 44.2 kg (97 lb 8 oz)   04/28/17 45.8 kg (100 lb 14.4 oz)   04/12/17 40.7 kg (89 lb 12.8 oz)   04/12/17 40.7 kg (89 lb 12.8 oz)   02/22/17 44.5 kg (98 lb)   02/22/17 44.7 kg (98 lb 9.6 oz)   02/01/17 47.7 kg (105 lb 1.6 oz)     Dosing Weight: 43 kg (actual wt)     ASSESSED NUTRITION NEEDS  Estimated Energy Needs: 5536-5568 kcals/day (35 - 40+ kcals/kg)  Justification: Post-op and Repletion  Estimated Protein Needs: 65 - 86 grams protein/day (1.5 - 2+ grams of pro/kg)  Justification: Post-op and Repletion  Estimated Fluid Needs: (1 mL/kcal)   Justification: Per provider pending fluid status    PHYSICAL FINDINGS  See malnutrition section below.    MALNUTRITION  % Intake: Decreased intake does not meet criteria  % Weight Loss: Weight loss does not meet criteria  Subcutaneous Fat Loss: Facial region, Upper arm, Lower arm and Thoracic/intercostal:  Moderate  Muscle Loss: Temporal, Facial & jaw region, Thoracic region (clavicle, acromium bone, deltoid, trapezius, pectoral), Upper arm (bicep, tricep) and Lower arm  (forearm):  Moderate  Fluid Accumulation/Edema: None noted per provider note  Malnutrition Diagnosis: Severe malnutrition in the context of chronic illness     NUTRITION DIAGNOSIS  Inadequate protein-energy intake related to increased nutritional needs with post-op and repletion and inadequate enteral infusion as evidenced by 3% wt loss x past 2 weeks, TF @ 35 " mL/hr at home per pt (meets 84% minimum kcal needs, 87% minimum protein needs).       INTERVENTIONS  Implementation  Nutrition Education -  Not appropriate at this time as pt was transferring to OR  Collaboration with other providers - Communicated with home TF goal regimen and pt's intolerance   Enteral Nutrition - See above recs      Goals  Total avg nutritional intake to meet a minimum of 35 kcal/kg and 1.5 g PRO/kg daily (per dosing wt 43 kg).     Monitoring/Evaluation  Progress toward goals will be monitored and evaluated per protocol.    Rina Colunga  Registration Eligible   Pager: [phone]    I agree with the above recommendations, goals, and interventions.    Mike Sherwood RD, LD

## 2017-05-19 NOTE — PLAN OF CARE
Problem: Goal Outcome Summary  Goal: Goal Outcome Summary  Outcome: No Change  VSS, TMax 100.7. Pain not controlled. MD updated. Home dose of oxycodone ordered. Pt scheduled for OR today. Plasma infusing. INR drawn. Up independently in room. No BM reported. Pre op scrub done on NOC shift. Family at bedside. Transport took patient by litter at 1230.

## 2017-05-20 ENCOUNTER — APPOINTMENT (OUTPATIENT)
Dept: GENERAL RADIOLOGY | Facility: CLINIC | Age: 72
DRG: 856 | End: 2017-05-20
Attending: STUDENT IN AN ORGANIZED HEALTH CARE EDUCATION/TRAINING PROGRAM
Payer: MEDICARE

## 2017-05-20 LAB
ANION GAP SERPL CALCULATED.3IONS-SCNC: 9 MMOL/L (ref 3–14)
BASOPHILS # BLD AUTO: 0 10E9/L (ref 0–0.2)
BASOPHILS NFR BLD AUTO: 0.1 %
BUN SERPL-MCNC: 4 MG/DL (ref 7–30)
CALCIUM SERPL-MCNC: 7.8 MG/DL (ref 8.5–10.1)
CHLORIDE SERPL-SCNC: 100 MMOL/L (ref 94–109)
CO2 SERPL-SCNC: 30 MMOL/L (ref 20–32)
CREAT SERPL-MCNC: 0.37 MG/DL (ref 0.52–1.04)
DIFFERENTIAL METHOD BLD: ABNORMAL
EOSINOPHIL # BLD AUTO: 0.1 10E9/L (ref 0–0.7)
EOSINOPHIL NFR BLD AUTO: 0.7 %
ERYTHROCYTE [DISTWIDTH] IN BLOOD BY AUTOMATED COUNT: 15.7 % (ref 10–15)
GFR SERPL CREATININE-BSD FRML MDRD: ABNORMAL ML/MIN/1.7M2
GLUCOSE SERPL-MCNC: 102 MG/DL (ref 70–99)
HCT VFR BLD AUTO: 24.9 % (ref 35–47)
HGB BLD-MCNC: 8.2 G/DL (ref 11.7–15.7)
IMM GRANULOCYTES # BLD: 0.1 10E9/L (ref 0–0.4)
IMM GRANULOCYTES NFR BLD: 0.5 %
INR PPP: 1.33 (ref 0.86–1.14)
LYMPHOCYTES # BLD AUTO: 0.8 10E9/L (ref 0.8–5.3)
LYMPHOCYTES NFR BLD AUTO: 5 %
MAGNESIUM SERPL-MCNC: 1.7 MG/DL (ref 1.6–2.3)
MAGNESIUM SERPL-MCNC: 2.1 MG/DL (ref 1.6–2.3)
MCH RBC QN AUTO: 28 PG (ref 26.5–33)
MCHC RBC AUTO-ENTMCNC: 32.9 G/DL (ref 31.5–36.5)
MCV RBC AUTO: 85 FL (ref 78–100)
MONOCYTES # BLD AUTO: 1.3 10E9/L (ref 0–1.3)
MONOCYTES NFR BLD AUTO: 8 %
NEUTROPHILS # BLD AUTO: 14 10E9/L (ref 1.6–8.3)
NEUTROPHILS NFR BLD AUTO: 85.7 %
NRBC # BLD AUTO: 0 10*3/UL
NRBC BLD AUTO-RTO: 0 /100
PHOSPHATE SERPL-MCNC: 2.2 MG/DL (ref 2.5–4.5)
PHOSPHATE SERPL-MCNC: 3.2 MG/DL (ref 2.5–4.5)
PLATELET # BLD AUTO: 500 10E9/L (ref 150–450)
POTASSIUM SERPL-SCNC: 2.9 MMOL/L (ref 3.4–5.3)
POTASSIUM SERPL-SCNC: 3.4 MMOL/L (ref 3.4–5.3)
RBC # BLD AUTO: 2.93 10E12/L (ref 3.8–5.2)
SODIUM SERPL-SCNC: 139 MMOL/L (ref 133–144)
WBC # BLD AUTO: 16.3 10E9/L (ref 4–11)

## 2017-05-20 PROCEDURE — 25000132 ZZH RX MED GY IP 250 OP 250 PS 637: Mod: GY | Performed by: THORACIC SURGERY (CARDIOTHORACIC VASCULAR SURGERY)

## 2017-05-20 PROCEDURE — 25000128 H RX IP 250 OP 636: Performed by: THORACIC SURGERY (CARDIOTHORACIC VASCULAR SURGERY)

## 2017-05-20 PROCEDURE — 25000128 H RX IP 250 OP 636: Performed by: SURGERY

## 2017-05-20 PROCEDURE — 25000132 ZZH RX MED GY IP 250 OP 250 PS 637: Mod: GY | Performed by: STUDENT IN AN ORGANIZED HEALTH CARE EDUCATION/TRAINING PROGRAM

## 2017-05-20 PROCEDURE — 25000128 H RX IP 250 OP 636: Performed by: STUDENT IN AN ORGANIZED HEALTH CARE EDUCATION/TRAINING PROGRAM

## 2017-05-20 PROCEDURE — A9270 NON-COVERED ITEM OR SERVICE: HCPCS | Mod: GY | Performed by: STUDENT IN AN ORGANIZED HEALTH CARE EDUCATION/TRAINING PROGRAM

## 2017-05-20 PROCEDURE — A9270 NON-COVERED ITEM OR SERVICE: HCPCS | Mod: GY | Performed by: THORACIC SURGERY (CARDIOTHORACIC VASCULAR SURGERY)

## 2017-05-20 PROCEDURE — 25000132 ZZH RX MED GY IP 250 OP 250 PS 637: Mod: GY | Performed by: SURGERY

## 2017-05-20 PROCEDURE — 25000125 ZZHC RX 250: Performed by: SURGERY

## 2017-05-20 PROCEDURE — 27210437 ZZH NUTRITION PRODUCT SEMIELEM INTERMED LITER

## 2017-05-20 PROCEDURE — 25000125 ZZHC RX 250: Performed by: THORACIC SURGERY (CARDIOTHORACIC VASCULAR SURGERY)

## 2017-05-20 PROCEDURE — A9270 NON-COVERED ITEM OR SERVICE: HCPCS | Mod: GY | Performed by: SURGERY

## 2017-05-20 PROCEDURE — 85025 COMPLETE CBC W/AUTO DIFF WBC: CPT | Performed by: SURGERY

## 2017-05-20 PROCEDURE — 12000003 ZZH R&B CRITICAL UMMC

## 2017-05-20 PROCEDURE — 84100 ASSAY OF PHOSPHORUS: CPT | Performed by: SURGERY

## 2017-05-20 PROCEDURE — 84132 ASSAY OF SERUM POTASSIUM: CPT | Performed by: SURGERY

## 2017-05-20 PROCEDURE — 94640 AIRWAY INHALATION TREATMENT: CPT

## 2017-05-20 PROCEDURE — 94640 AIRWAY INHALATION TREATMENT: CPT | Mod: 76

## 2017-05-20 PROCEDURE — 80048 BASIC METABOLIC PNL TOTAL CA: CPT | Performed by: SURGERY

## 2017-05-20 PROCEDURE — 85610 PROTHROMBIN TIME: CPT | Performed by: SURGERY

## 2017-05-20 PROCEDURE — 83735 ASSAY OF MAGNESIUM: CPT | Performed by: SURGERY

## 2017-05-20 PROCEDURE — 25000125 ZZHC RX 250: Performed by: STUDENT IN AN ORGANIZED HEALTH CARE EDUCATION/TRAINING PROGRAM

## 2017-05-20 PROCEDURE — 71010 XR CHEST PORT 1 VW: CPT

## 2017-05-20 RX ORDER — POTASSIUM CHLORIDE 29.8 MG/ML
20 INJECTION INTRAVENOUS
Status: DISPENSED | OUTPATIENT
Start: 2017-05-20 | End: 2017-05-20

## 2017-05-20 RX ORDER — HEPARIN SODIUM,PORCINE 10 UNIT/ML
2-5 VIAL (ML) INTRAVENOUS
Status: COMPLETED | OUTPATIENT
Start: 2017-05-20 | End: 2017-05-21

## 2017-05-20 RX ORDER — LIDOCAINE 40 MG/G
CREAM TOPICAL
Status: DISCONTINUED | OUTPATIENT
Start: 2017-05-20 | End: 2017-06-03

## 2017-05-20 RX ORDER — HYDROXYZINE HYDROCHLORIDE 10 MG/1
10 TABLET, FILM COATED ORAL 3 TIMES DAILY PRN
Status: DISCONTINUED | OUTPATIENT
Start: 2017-05-20 | End: 2017-05-25

## 2017-05-20 RX ADMIN — Medication 6 MG: at 15:55

## 2017-05-20 RX ADMIN — POTASSIUM CHLORIDE 40 MEQ: 1.5 POWDER, FOR SOLUTION ORAL at 06:00

## 2017-05-20 RX ADMIN — ACETAMINOPHEN 975 MG: 325 TABLET, FILM COATED ORAL at 21:20

## 2017-05-20 RX ADMIN — RANITIDINE HYDROCHLORIDE 150 MG: 150 SOLUTION ORAL at 21:23

## 2017-05-20 RX ADMIN — POTASSIUM CHLORIDE 20 MEQ: 29.8 INJECTION, SOLUTION INTRAVENOUS at 08:10

## 2017-05-20 RX ADMIN — LOPERAMIDE HYDROCHLORIDE 4 MG: 1 SOLUTION ORAL at 22:14

## 2017-05-20 RX ADMIN — IPRATROPIUM BROMIDE AND ALBUTEROL SULFATE 3 ML: .5; 3 SOLUTION RESPIRATORY (INHALATION) at 15:51

## 2017-05-20 RX ADMIN — PIPERACILLIN SODIUM,TAZOBACTAM SODIUM 3.38 G: 3; .375 INJECTION, POWDER, FOR SOLUTION INTRAVENOUS at 14:22

## 2017-05-20 RX ADMIN — HYDROMORPHONE HYDROCHLORIDE: 10 INJECTION, SOLUTION INTRAMUSCULAR; INTRAVENOUS; SUBCUTANEOUS at 06:08

## 2017-05-20 RX ADMIN — ACETAMINOPHEN 975 MG: 325 TABLET, FILM COATED ORAL at 06:01

## 2017-05-20 RX ADMIN — Medication 6 MG: at 23:23

## 2017-05-20 RX ADMIN — HEPARIN SODIUM 5000 UNITS: 5000 INJECTION, SOLUTION INTRAVENOUS; SUBCUTANEOUS at 10:36

## 2017-05-20 RX ADMIN — PANTOPRAZOLE SODIUM 40 MG: 40 TABLET, DELAYED RELEASE ORAL at 08:15

## 2017-05-20 RX ADMIN — IPRATROPIUM BROMIDE AND ALBUTEROL SULFATE 3 ML: .5; 3 SOLUTION RESPIRATORY (INHALATION) at 12:53

## 2017-05-20 RX ADMIN — PIPERACILLIN SODIUM,TAZOBACTAM SODIUM 3.38 G: 3; .375 INJECTION, POWDER, FOR SOLUTION INTRAVENOUS at 22:12

## 2017-05-20 RX ADMIN — TRAZODONE HYDROCHLORIDE 50 MG: 50 TABLET ORAL at 23:23

## 2017-05-20 RX ADMIN — IPRATROPIUM BROMIDE AND ALBUTEROL SULFATE 3 ML: .5; 3 SOLUTION RESPIRATORY (INHALATION) at 08:29

## 2017-05-20 RX ADMIN — ACETAMINOPHEN 975 MG: 325 TABLET, FILM COATED ORAL at 11:30

## 2017-05-20 RX ADMIN — Medication 5 ML: at 06:00

## 2017-05-20 RX ADMIN — FLUCONAZOLE 200 MG: 2 INJECTION INTRAVENOUS at 21:17

## 2017-05-20 RX ADMIN — Medication 2 G: at 06:01

## 2017-05-20 RX ADMIN — PIPERACILLIN SODIUM,TAZOBACTAM SODIUM 3.38 G: 3; .375 INJECTION, POWDER, FOR SOLUTION INTRAVENOUS at 03:27

## 2017-05-20 RX ADMIN — SODIUM PHOSPHATE, MONOBASIC, MONOHYDRATE AND SODIUM PHOSPHATE, DIBASIC, ANHYDROUS 15 MMOL: 276; 142 INJECTION, SOLUTION INTRAVENOUS at 16:20

## 2017-05-20 RX ADMIN — GABAPENTIN 300 MG: 250 SOLUTION ORAL at 11:29

## 2017-05-20 RX ADMIN — Medication 6 MG: at 03:27

## 2017-05-20 RX ADMIN — HYDROMORPHONE HYDROCHLORIDE 0.5 MG: 1 INJECTION, SOLUTION INTRAMUSCULAR; INTRAVENOUS; SUBCUTANEOUS at 07:56

## 2017-05-20 RX ADMIN — GABAPENTIN 300 MG: 250 SOLUTION ORAL at 06:02

## 2017-05-20 RX ADMIN — HEPARIN SODIUM 5000 UNITS: 5000 INJECTION, SOLUTION INTRAVENOUS; SUBCUTANEOUS at 21:23

## 2017-05-20 RX ADMIN — Medication 6 MG: at 10:36

## 2017-05-20 RX ADMIN — METOPROLOL TARTRATE 25 MG: 100 TABLET ORAL at 21:20

## 2017-05-20 RX ADMIN — PIPERACILLIN SODIUM,TAZOBACTAM SODIUM 3.38 G: 3; .375 INJECTION, POWDER, FOR SOLUTION INTRAVENOUS at 08:16

## 2017-05-20 RX ADMIN — POTASSIUM CHLORIDE 20 MEQ: 29.8 INJECTION, SOLUTION INTRAVENOUS at 14:20

## 2017-05-20 RX ADMIN — GABAPENTIN 300 MG: 250 SOLUTION ORAL at 22:20

## 2017-05-20 RX ADMIN — LOPERAMIDE HYDROCHLORIDE 4 MG: 1 SOLUTION ORAL at 08:14

## 2017-05-20 ASSESSMENT — PAIN DESCRIPTION - DESCRIPTORS
DESCRIPTORS: SORE

## 2017-05-20 NOTE — PLAN OF CARE
Problem: Goal Outcome Summary  Goal: Goal Outcome Summary  Outcome: Improving  D: 72 year old female POD #1 S/P debridement of a sternal wound, in ICU overnight for close monitoring.  I/A: Pain is tolerable with current PCA regimen, assisted to the chair this afternoon. Vitals stable afebrile. Sternal wound dressing changed by thoracic team this morning. Shadow serosanguinous drainage noted on dressing and reinforced. Voiding. No diarrhea this shift. Tube feeding is at goal. K 3.4, Mag 2.1, Phos 2.2 replaced per protocol.  P: Transfer to unit 7B for continuation of cares. Report to be given to the receiving nurse.

## 2017-05-20 NOTE — H&P
SURGICAL ICU ADMISSION NOTE  5/19/2017    PRIMARY TEAM: Thoracic Surgery  PRIMARY PHYSICIAN: Dr. Barreto and Dr. Wise    REASON FOR CRITICAL CARE ADMISSION: Post operative monitoring       HISTORY PRESENTING ILLNESS: This is a 72 year old female with PMH significant for an esophageal perforation. She is most recently status post a retrosternal gastric pull through with manubrium resection, spit fistula take-down and placement of a jejunostomy tube in April. She was discharged post-operatively from that procedure on 4/28 however readmitted from 5/3-5/8 with a pneumonia. She was then readmitted on 5/18 for a chest wound infection. She was taken to the operating room today for further debridement and noted to have an esophageal perforation and a small associated abscess cavity. She had a partial rib resection and debridement. She is admitted to the SICU postoperatively for hemodynamic monitoring and pain control.     Review of systems:  Patient endorses surgical pain, otherwise unremarkable.     PAST MEDICAL HISTORY:   has a past medical history of Atrial fibrillation (H); Breast cancer (H) (2007); Esophageal perforation; Fibromyalgia; GERD (gastroesophageal reflux disease); Hiatal hernia; History of blood transfusion; IBS (irritable bowel syndrome); Meniere's disease; and MS (multiple sclerosis) (H).    PAST SURGICAL HISTORY:   has a past surgical history that includes Esophagoscopy, gastroscopy, duodenoscopy (EGD), combined (N/A, 4/18/2016); Pharyngostomy (N/A, 4/18/2016); Esophagoscopy, gastroscopy, duodenoscopy (EGD), combined (N/A, 4/25/2016); Pharyngostomy (N/A, 4/25/2016); appendectomy; Thoracotomy (Right, 1998); back surgery (5/1/2015); Wrist surgery; Nissen fundoplication (1/2016); GASTROSTOMY/JEJUN TUBE; Esophagoscopy, gastroscopy, duodenoscopy (EGD), combined (N/A, 5/4/2016); Pharyngostomy (N/A, 5/4/2016); Esophagoscopy, gastroscopy, duodenoscopy (EGD), combined (N/A, 5/18/2016); Esophagoscopy, gastroscopy,  duodenoscopy (EGD), combined (N/A, 6/22/2016); Pharyngostomy (N/A, 6/22/2016); Esophagoscopy, gastroscopy, duodenoscopy (EGD), dilatation, combined (N/A, 6/29/2016); Irrigation and debridement chest washout, combined (N/A, 6/29/2016); Pharyngostomy (N/A, 6/29/2016); UGI ENDOSCOPY W TRANSENDOSCOPIC STENT PLACEMENT (N/A, 7/12/2016); Esophagoscopy, gastroscopy, duodenoscopy (EGD), combined (N/A, 7/12/2016); UGI ENDOSCOPY W TRANSENDOSCOPIC STENT PLACEMENT (N/A, 7/22/2016); picc insertion (Left, 8/25/2016); Combined Esophagoscopy, Gastroscopy, Duodenoscopy (EGD), Remove Esophageal Stent (N/A, 8/26/2016); Nerve block peripheral (N/A, 8/30/2016); Lumpectomy breast (Right, 2007); Laparoscopy diagnostic (general) (N/A, 1/9/2017); Thoracotomy (Left, 1/9/2017); Esophagectomy (N/A, 1/9/2017); Create spit fistula (N/A, 1/9/2017); Laparoscopy diagnostic (general) (N/A, 4/17/2017); Close spit fistula (N/A, 4/17/2017); Laparoscopic assisted insertion tube jejunostomy (N/A, 4/17/2017); Bronchoscopy flexible (N/A, 4/17/2017); Esophagoscopy, gastroscopy, duodenoscopy (EGD), combined (N/A, 4/21/2017); and Pharyngostomy (N/A, 4/21/2017).    FAMILY HISTORY:  family history includes Alzheimer Disease in her mother; Breast Cancer in her daughter; CEREBROVASCULAR DISEASE in her father; Ovarian Cancer in her sister.    SOCIAL HISTORY:   reports that she quit smoking about 19 years ago. Her smoking use included Cigarettes. She has a 15.00 pack-year smoking history. She does not have any smokeless tobacco history on file. She reports that she does not drink alcohol or use illicit drugs.    ALLERGIES:  - amoxicillin, ativan, morphine (itching)    MEDICATIONS:  Prescriptions Prior to Admission   Medication Sig Dispense Refill Last Dose     oxyCODONE (ROXICODONE) 10 MG IR tablet Take 1 to 1.5 tablet every 3-4 hours PRN pain 60 tablet 0 5/18/2017 at 1200     diphenoxylate-atropine (LOMOTIL) 0.5-0.005 mg/mL liquid Take 5 mLs by mouth 4 times daily  as needed for diarrhea 300 mL 0 5/18/2017 at 0800     fiber modular (NUTRISOURCE FIBER) packet 1 packet by Per Feeding Tube route 3 times daily (with meals) 60 packet 0 Unknown at Unknown time     metoprolol (LOPRESSOR) 10 mg/mL SUSP 2.5 mLs (25 mg) by Per J Tube route 2 times daily   5/18/2017 at 0800     opium tincture 10 mg/mL (1%) liquid Take 0.6 mLs (6 mg) by mouth every 6 hours 118 mL 0 5/18/2017 at 0800     ferrous sulfate 300 (60 FE) MG/5ML syrup 5 mLs (300 mg) by Per J Tube route daily 150 mL 0 5/17/2017 at 2000     gabapentin (NEURONTIN) 250 MG/5ML solution Take 6 mLs (300 mg) by mouth every 8 hours 450 mL 0 5/18/2017 at 0800     traZODone (DESYREL) 100 MG tablet 0.5 tablets (50 mg) by Per G Tube route At Bedtime (Patient taking differently: 100 mg by Per G Tube route At Bedtime ) 30 tablet 0 5/18/2017 at 2200     simethicone (MYLICON) 40 MG/0.6ML suspension 0.6 mLs (40 mg) by Per Feeding Tube route every 6 hours as needed for cramping 30 mL 0 5/18/2017 at 0800     ranitidine (ZANTAC) 150 MG/10ML syrup Take 10 mLs (150 mg) by mouth 2 times daily 600 mL 0 5/18/2017 at 0800     warfarin (COUMADIN) 2.5 MG tablet Take 1 tablet (2.5 mg) by mouth daily (Patient not taking: Reported on 5/18/2017) 30 tablet 1 Unknown at Unknown time     order for DME Equipment being ordered:   [facility] [place] Suction Machine-Intermittent  Suction Canisters(2)  Suction Canister Holders(2)  Suction Connect Tube(2)  5 in 1 Connector(2)  Bacteria Filter(2)  Yaunkauer Suction(2)    Treatment Diagnosis: Esophogeal Perforation, s/p esophagectomy 1 Device 0 Taking     order for DME Equipment being ordered:   Suction Pump  Suction Canister(2)  Suction Tubing(2)  Bacteria Filter(2)  Yaunkauer(4)  Red Rubber Catheter(2)    Treatment Diagnosis: Esophogeal Perforation, s/p esophagectomy 1 Device 0 Taking     DiphenhydrAMINE HCl (BENADRYL PO) Take 25 mg by mouth nightly as needed   5/17/2017 at 2000     cholestyramine (QUESTRAN) 4 G Packet Take 1 packet  by mouth daily   5/18/2017 at 0800     multivitamins with minerals (CERTAVITE/CEROVITE) LIQD liquid Take 15 mLs by mouth daily   5/17/2017 at 0800     loperamide (IMODIUM) 1 MG/5ML liquid Take 20 mLs (4 mg) by mouth 4 times daily as needed for diarrhea (Patient taking differently: Take 4 mg by mouth 2 times daily ) 200 mL  5/18/2017 at 0800     Lactobacillus Rhamnosus, GG, (CULTURELLE PO) Take 1 tablet by mouth 2 times daily    5/18/2017 at 0800        PHYSICAL EXAMINATION:  Temp:  [96.8  F (36  C)-100.8  F (38.2  C)] 97.4  F (36.3  C)  Pulse:  [82-93] 92  Heart Rate:  [72-84] 75  Resp:  [16-20] 18  BP: (100-143)/(43-81) 140/73  SpO2:  [95 %-100 %] 98 %    General: alert, no distress, resting comfortably in bed  Neuro: Alert and oriented, no focal deficits.    CV: RRR.   Resp: Breathing unlabored , symmetric chest rise.   Chest incision is dressed, scant ss shadowing on the dressing. Drainage chest tube in place, SS output.   Abdomen: Abdomen soft, non-tender, non-distended, BS normal. No masses, organomegaly. J tube in place  MSK: Normal peripheral pulses, no edema, no gross deformities    LABS:  PACU labs reviewed - Hgb 6, WBC 16. INR 1.87    IMAGING:  Post op CXR reviewed      ASSESSMENT: Minerva Blanco is a 72 year old female POD # 0 from an EGD, chest debridement and rib resection due to an anastomotic leak from a substernal gastric pull through.     PLAN:  Neuro/ pain/ sedation:  - Monitor neurological status. Notify the MD for any acute changes in exam.  - Dilaudid PCA, acetaminophen, oxycodone for pain.    Pulmonary:   - Supplemental oxygen to keep saturation above 92 %.  - Incentive spirometer every 15- 30 minutes when awake.  - Aggressive pulmonary toilet.     Cardiovascular:    - Monitor hemodynamic status.   -  Restart home metoprolol tomorrow.     Fluids/ Electrolytes/ Nutrition/ GI:   - mIVF for now, resume home TF with free water flushes tonight. Start at 25/hr then titrate up to goal. Stop with  any nausea.     Renal:    - No ojeda in place, monitor I/Os closely  - Place ojeda if retention  - Received lasix today with FFP, will continue to monitor with daily weights.     Endocrine:    - No management indication.     ID/ Antibiotics:  - Continue zosyn/fluconazole for esophageal perforation and abscess.     Heme:     - Hemoglobin 6, acute blood loss anemia on chronic anemia.   - Transfused 1 U PRBC in the PACU  - Follow up AM labs  - Received FFP for INR, give vitamin K post-operatively  - Hold chemical prophylaxis tonight.     Prophylaxis:    - No chemical DVT prophylaxis due to high risk of bleeding.     Lines/ tubes/ drains:  - R IJ Central line  - Chest tube in abscess cavity  - Feeding jujunostomy tube      Disposition:  - Surgical ICU.   - Primary team: Thoracic Surgery  - Consulting teams:     Patient seen, findings and plan discussed with surgical ICU team    Viki Carmen  General Surgery  [phone]

## 2017-05-20 NOTE — PLAN OF CARE
Problem: Goal Outcome Summary  Goal: Goal Outcome Summary  Outcome: Improving  Status  D/I: Patient on unit 4A Surgical/Neuro ICU s/p readmission for chest wound infection.  POD #1 for debridement - noted to have a small esophageal perforation with associated abscess cavity.  Partial rib resection as well.      Neuro- R pupil > L at baseline.  Deaf in L ear.    CV- NSR, noted to have few PACs.  BP WNL.    Pulm- LS clear, diminished in bases.  3LPM via NC.    GI- TF at 25/hr, may advance at 0800.  Multiple small liquids BM overnight.  x1 episode of incontinence.  Extensive bowel regimen for diarrhea.     - Voiding via bedpan.    Skin- ARTURO chest incision, packing in place and reinforced x1.  Chest tube to gravity drainage bag, site reinforced as well.  Serosanguinous drainage.  TL IJ.  Plan for PICC placement today.    Gtts- LR at 50/hr.    ID- Zosyn and Diflucan scheduled.  Electrolytes- K+ low, given 40 Meq via J-tube and per SICU administer 20 Meq KCl via central line for second replacement set.  Recheck after completion.  Mag also replaced.    See flow sheets for further interventions and assessments.  P: Continue to monitor pt closely, Notify MD of changes/concerns.

## 2017-05-20 NOTE — PROGRESS NOTES
Spoke with ROMAINE Chawla, informed PICC placement will be tomorrow 5/20. Pt has current RIJ triple lumen and PIV.

## 2017-05-20 NOTE — PROGRESS NOTES
Thoracic Surgery Daily Progress Note  Minerva Blanco  05/20/2017    Subjective:   No acute events overnight. Pain is moderately well controlled with dilaudid PCA. Going up on tube feeds without issues. Received 1UPRBC in PACU for Hgb of 6. No nausea or emesis. No fevers or chills.     Objective:    /63  Pulse 92  Temp 98.2  F (36.8  C)  Resp 20  Ht 1.524 m (5')  Wt 46.1 kg (101 lb 10.1 oz)  SpO2 100%  BMI 19.85 kg/m2  Laying comfortably in bed in no acute distress  Awake, alert and appropriate  Nonlabored breathing on room air  Regular rate and rhythm  Incisions packed, packing serosanguinous. Dressing changed, wound with serosanguinous drainage, no purulence, redressed.   CT in place with minimal drainage.     Ins/Outs:   I/O last 3 completed shifts:  In: 2545 [I.V.:1025; NG/GT:150]  Out: 1089 [Urine:675; Other:200; Blood:210; Chest Tube:4]    Labs:   Reviewed. Hemoglobin improved post transfusion. K 2.9, being replaced. WBC down to 16.3    Recent Imaging:   CXR reviewed.     Assessment:  Minerva Blanco is a 72 year old female with complex past surgical history most recently status post a retrosternal gastric pull through, manubrium resection, spit fistula takedown, J-tube placement now with an anastomotic leak s/p I&D in OR on 5/19    Continue current care in SICU  Continue daily dressing changes at bedside. Will plan on OR on Monday for dressing change in OR.   Continue IV antibiotics.  Replace electrolytes.   Continue to increase tube feeds to goal, can discontinue mIVF.     Seen and discussed with fellow, who discussed with staff.     Viki Carmen MD  General Surgery  Pager: [phone]

## 2017-05-20 NOTE — PROGRESS NOTES
"D: PICC RN HERE TO PLACE LINE. THE PATIENT IS HESITANT TO HAVE THE LINE PLACED. THE PRIMARY TEAM WAS ROUNDING AND THE DOCTOR TOLD ME TO \"PUT THE PICC ON HOLD FOR NOW.\"  A: PRIMARY RN NOTIFIED.  P: NO FURTHER PLAN UNTIL TEAM RE-ORDERS PICC PLACEMENT.  JOSE ALBERTO Metz    "

## 2017-05-21 ENCOUNTER — APPOINTMENT (OUTPATIENT)
Dept: OCCUPATIONAL THERAPY | Facility: CLINIC | Age: 72
DRG: 856 | End: 2017-05-21
Attending: STUDENT IN AN ORGANIZED HEALTH CARE EDUCATION/TRAINING PROGRAM
Payer: MEDICARE

## 2017-05-21 LAB
ANION GAP SERPL CALCULATED.3IONS-SCNC: 8 MMOL/L (ref 3–14)
BACTERIA SPEC CULT: ABNORMAL
BASOPHILS # BLD AUTO: 0 10E9/L (ref 0–0.2)
BASOPHILS NFR BLD AUTO: 0.2 %
BUN SERPL-MCNC: 6 MG/DL (ref 7–30)
CALCIUM SERPL-MCNC: 7.7 MG/DL (ref 8.5–10.1)
CHLORIDE SERPL-SCNC: 107 MMOL/L (ref 94–109)
CO2 SERPL-SCNC: 29 MMOL/L (ref 20–32)
CREAT SERPL-MCNC: 0.33 MG/DL (ref 0.52–1.04)
DIFFERENTIAL METHOD BLD: ABNORMAL
EOSINOPHIL # BLD AUTO: 0.4 10E9/L (ref 0–0.7)
EOSINOPHIL NFR BLD AUTO: 3 %
ERYTHROCYTE [DISTWIDTH] IN BLOOD BY AUTOMATED COUNT: 16 % (ref 10–15)
GFR SERPL CREATININE-BSD FRML MDRD: ABNORMAL ML/MIN/1.7M2
GLUCOSE BLDC GLUCOMTR-MCNC: 105 MG/DL (ref 70–99)
GLUCOSE SERPL-MCNC: 91 MG/DL (ref 70–99)
HCT VFR BLD AUTO: 23.1 % (ref 35–47)
HGB BLD-MCNC: 7.4 G/DL (ref 11.7–15.7)
IMM GRANULOCYTES # BLD: 0.1 10E9/L (ref 0–0.4)
IMM GRANULOCYTES NFR BLD: 0.7 %
LACTATE BLD-SCNC: 0.9 MMOL/L (ref 0.7–2.1)
LYMPHOCYTES # BLD AUTO: 1.4 10E9/L (ref 0.8–5.3)
LYMPHOCYTES NFR BLD AUTO: 11.2 %
Lab: ABNORMAL
MAGNESIUM SERPL-MCNC: 1.8 MG/DL (ref 1.6–2.3)
MCH RBC QN AUTO: 27.6 PG (ref 26.5–33)
MCHC RBC AUTO-ENTMCNC: 32 G/DL (ref 31.5–36.5)
MCV RBC AUTO: 86 FL (ref 78–100)
MICRO REPORT STATUS: ABNORMAL
MONOCYTES # BLD AUTO: 1.4 10E9/L (ref 0–1.3)
MONOCYTES NFR BLD AUTO: 11.3 %
NEUTROPHILS # BLD AUTO: 8.9 10E9/L (ref 1.6–8.3)
NEUTROPHILS NFR BLD AUTO: 73.6 %
NRBC # BLD AUTO: 0 10*3/UL
NRBC BLD AUTO-RTO: 0 /100
PHOSPHATE SERPL-MCNC: 2.8 MG/DL (ref 2.5–4.5)
PLATELET # BLD AUTO: 448 10E9/L (ref 150–450)
PLATELET # BLD EST: ABNORMAL 10*3/UL
POTASSIUM SERPL-SCNC: 3.8 MMOL/L (ref 3.4–5.3)
RBC # BLD AUTO: 2.68 10E12/L (ref 3.8–5.2)
SODIUM SERPL-SCNC: 143 MMOL/L (ref 133–144)
SPECIMEN SOURCE: ABNORMAL
WBC # BLD AUTO: 12.1 10E9/L (ref 4–11)

## 2017-05-21 PROCEDURE — 83605 ASSAY OF LACTIC ACID: CPT | Performed by: THORACIC SURGERY (CARDIOTHORACIC VASCULAR SURGERY)

## 2017-05-21 PROCEDURE — 85025 COMPLETE CBC W/AUTO DIFF WBC: CPT | Performed by: SURGERY

## 2017-05-21 PROCEDURE — 40000133 ZZH STATISTIC OT WARD VISIT

## 2017-05-21 PROCEDURE — 25000125 ZZHC RX 250: Performed by: SURGERY

## 2017-05-21 PROCEDURE — 84100 ASSAY OF PHOSPHORUS: CPT | Performed by: SURGERY

## 2017-05-21 PROCEDURE — 27210437 ZZH NUTRITION PRODUCT SEMIELEM INTERMED LITER

## 2017-05-21 PROCEDURE — 12000003 ZZH R&B CRITICAL UMMC

## 2017-05-21 PROCEDURE — 25000132 ZZH RX MED GY IP 250 OP 250 PS 637: Mod: GY | Performed by: THORACIC SURGERY (CARDIOTHORACIC VASCULAR SURGERY)

## 2017-05-21 PROCEDURE — 25000125 ZZHC RX 250: Performed by: STUDENT IN AN ORGANIZED HEALTH CARE EDUCATION/TRAINING PROGRAM

## 2017-05-21 PROCEDURE — 25000128 H RX IP 250 OP 636: Performed by: STUDENT IN AN ORGANIZED HEALTH CARE EDUCATION/TRAINING PROGRAM

## 2017-05-21 PROCEDURE — 25000128 H RX IP 250 OP 636: Performed by: SURGERY

## 2017-05-21 PROCEDURE — A9270 NON-COVERED ITEM OR SERVICE: HCPCS | Mod: GY | Performed by: SURGERY

## 2017-05-21 PROCEDURE — 25000132 ZZH RX MED GY IP 250 OP 250 PS 637: Mod: GY | Performed by: SURGERY

## 2017-05-21 PROCEDURE — 36592 COLLECT BLOOD FROM PICC: CPT | Performed by: SURGERY

## 2017-05-21 PROCEDURE — 97535 SELF CARE MNGMENT TRAINING: CPT | Mod: GO

## 2017-05-21 PROCEDURE — 80048 BASIC METABOLIC PNL TOTAL CA: CPT | Performed by: SURGERY

## 2017-05-21 PROCEDURE — 83735 ASSAY OF MAGNESIUM: CPT | Performed by: SURGERY

## 2017-05-21 PROCEDURE — A9270 NON-COVERED ITEM OR SERVICE: HCPCS | Mod: GY | Performed by: STUDENT IN AN ORGANIZED HEALTH CARE EDUCATION/TRAINING PROGRAM

## 2017-05-21 PROCEDURE — 36592 COLLECT BLOOD FROM PICC: CPT | Performed by: THORACIC SURGERY (CARDIOTHORACIC VASCULAR SURGERY)

## 2017-05-21 PROCEDURE — 25000132 ZZH RX MED GY IP 250 OP 250 PS 637: Mod: GY | Performed by: STUDENT IN AN ORGANIZED HEALTH CARE EDUCATION/TRAINING PROGRAM

## 2017-05-21 PROCEDURE — 25000128 H RX IP 250 OP 636: Performed by: THORACIC SURGERY (CARDIOTHORACIC VASCULAR SURGERY)

## 2017-05-21 PROCEDURE — A9270 NON-COVERED ITEM OR SERVICE: HCPCS | Mod: GY | Performed by: THORACIC SURGERY (CARDIOTHORACIC VASCULAR SURGERY)

## 2017-05-21 PROCEDURE — 97166 OT EVAL MOD COMPLEX 45 MIN: CPT | Mod: GO

## 2017-05-21 PROCEDURE — 00000146 ZZHCL STATISTIC GLUCOSE BY METER IP

## 2017-05-21 RX ORDER — HEPARIN SODIUM,PORCINE 10 UNIT/ML
2-5 VIAL (ML) INTRAVENOUS
Status: COMPLETED | OUTPATIENT
Start: 2017-05-21 | End: 2017-05-25

## 2017-05-21 RX ORDER — HYDROMORPHONE HYDROCHLORIDE 1 MG/ML
.3-.5 INJECTION, SOLUTION INTRAMUSCULAR; INTRAVENOUS; SUBCUTANEOUS
Status: DISCONTINUED | OUTPATIENT
Start: 2017-05-21 | End: 2017-05-27

## 2017-05-21 RX ORDER — OXYCODONE HCL 5 MG/5 ML
10-15 SOLUTION, ORAL ORAL EVERY 4 HOURS PRN
Status: DISCONTINUED | OUTPATIENT
Start: 2017-05-21 | End: 2017-05-24

## 2017-05-21 RX ORDER — FLUORIDE TOOTHPASTE
20 TOOTHPASTE DENTAL 4 TIMES DAILY PRN
Status: DISCONTINUED | OUTPATIENT
Start: 2017-05-21 | End: 2017-06-29 | Stop reason: HOSPADM

## 2017-05-21 RX ORDER — LIDOCAINE 40 MG/G
CREAM TOPICAL
Status: DISCONTINUED | OUTPATIENT
Start: 2017-05-21 | End: 2017-06-03

## 2017-05-21 RX ORDER — IPRATROPIUM BROMIDE AND ALBUTEROL SULFATE 2.5; .5 MG/3ML; MG/3ML
3 SOLUTION RESPIRATORY (INHALATION) EVERY 4 HOURS PRN
Status: DISCONTINUED | OUTPATIENT
Start: 2017-05-21 | End: 2017-06-29 | Stop reason: HOSPADM

## 2017-05-21 RX ORDER — CEFAZOLIN SODIUM 1 G/3ML
1 INJECTION, POWDER, FOR SOLUTION INTRAMUSCULAR; INTRAVENOUS SEE ADMIN INSTRUCTIONS
Status: DISCONTINUED | OUTPATIENT
Start: 2017-05-21 | End: 2017-05-23 | Stop reason: HOSPADM

## 2017-05-21 RX ORDER — CEFAZOLIN SODIUM 2 G/100ML
2 INJECTION, SOLUTION INTRAVENOUS
Status: DISCONTINUED | OUTPATIENT
Start: 2017-05-21 | End: 2017-05-23 | Stop reason: HOSPADM

## 2017-05-21 RX ADMIN — HEPARIN SODIUM 5000 UNITS: 5000 INJECTION, SOLUTION INTRAVENOUS; SUBCUTANEOUS at 05:31

## 2017-05-21 RX ADMIN — Medication 6 MG: at 21:19

## 2017-05-21 RX ADMIN — FLUCONAZOLE 200 MG: 2 INJECTION INTRAVENOUS at 20:29

## 2017-05-21 RX ADMIN — OXYCODONE HYDROCHLORIDE 15 MG: 5 SOLUTION ORAL at 10:30

## 2017-05-21 RX ADMIN — HEPARIN SODIUM 5000 UNITS: 5000 INJECTION, SOLUTION INTRAVENOUS; SUBCUTANEOUS at 20:28

## 2017-05-21 RX ADMIN — RANITIDINE HYDROCHLORIDE 150 MG: 150 SOLUTION ORAL at 20:28

## 2017-05-21 RX ADMIN — GABAPENTIN 300 MG: 250 SOLUTION ORAL at 06:03

## 2017-05-21 RX ADMIN — ACETAMINOPHEN 975 MG: 325 TABLET, FILM COATED ORAL at 05:21

## 2017-05-21 RX ADMIN — LOPERAMIDE HYDROCHLORIDE 4 MG: 1 SOLUTION ORAL at 20:28

## 2017-05-21 RX ADMIN — METOPROLOL TARTRATE 25 MG: 100 TABLET ORAL at 10:36

## 2017-05-21 RX ADMIN — TRAZODONE HYDROCHLORIDE 50 MG: 50 TABLET ORAL at 21:19

## 2017-05-21 RX ADMIN — GABAPENTIN 300 MG: 250 SOLUTION ORAL at 14:07

## 2017-05-21 RX ADMIN — SODIUM CHLORIDE, PRESERVATIVE FREE 5 ML: 5 INJECTION INTRAVENOUS at 07:51

## 2017-05-21 RX ADMIN — OXYCODONE HYDROCHLORIDE 15 MG: 5 SOLUTION ORAL at 20:28

## 2017-05-21 RX ADMIN — Medication 20 ML: at 10:41

## 2017-05-21 RX ADMIN — Medication 2 G: at 18:03

## 2017-05-21 RX ADMIN — OXYCODONE HYDROCHLORIDE 15 MG: 5 SOLUTION ORAL at 15:28

## 2017-05-21 RX ADMIN — HEPARIN SODIUM 5000 UNITS: 5000 INJECTION, SOLUTION INTRAVENOUS; SUBCUTANEOUS at 12:29

## 2017-05-21 RX ADMIN — HYDROMORPHONE HYDROCHLORIDE 0.5 MG: 1 INJECTION, SOLUTION INTRAMUSCULAR; INTRAVENOUS; SUBCUTANEOUS at 14:06

## 2017-05-21 RX ADMIN — Medication 6 MG: at 10:35

## 2017-05-21 RX ADMIN — ACETAMINOPHEN 975 MG: 325 SOLUTION ORAL at 14:06

## 2017-05-21 RX ADMIN — Medication 6 MG: at 05:23

## 2017-05-21 RX ADMIN — Medication 6 MG: at 17:10

## 2017-05-21 RX ADMIN — PIPERACILLIN SODIUM,TAZOBACTAM SODIUM 3.38 G: 3; .375 INJECTION, POWDER, FOR SOLUTION INTRAVENOUS at 10:35

## 2017-05-21 RX ADMIN — PIPERACILLIN SODIUM,TAZOBACTAM SODIUM 3.38 G: 3; .375 INJECTION, POWDER, FOR SOLUTION INTRAVENOUS at 16:54

## 2017-05-21 RX ADMIN — LOPERAMIDE HYDROCHLORIDE 4 MG: 1 SOLUTION ORAL at 10:36

## 2017-05-21 RX ADMIN — POTASSIUM CHLORIDE 20 MEQ: 1.5 POWDER, FOR SOLUTION ORAL at 17:11

## 2017-05-21 RX ADMIN — PIPERACILLIN SODIUM,TAZOBACTAM SODIUM 3.38 G: 3; .375 INJECTION, POWDER, FOR SOLUTION INTRAVENOUS at 05:20

## 2017-05-21 RX ADMIN — Medication 5 ML: at 14:06

## 2017-05-21 RX ADMIN — GABAPENTIN 300 MG: 250 SOLUTION ORAL at 20:28

## 2017-05-21 RX ADMIN — METOPROLOL TARTRATE 25 MG: 100 TABLET ORAL at 20:29

## 2017-05-21 RX ADMIN — HYDROMORPHONE HYDROCHLORIDE 0.5 MG: 1 INJECTION, SOLUTION INTRAMUSCULAR; INTRAVENOUS; SUBCUTANEOUS at 18:22

## 2017-05-21 RX ADMIN — PIPERACILLIN SODIUM,TAZOBACTAM SODIUM 3.38 G: 3; .375 INJECTION, POWDER, FOR SOLUTION INTRAVENOUS at 21:55

## 2017-05-21 ASSESSMENT — ACTIVITIES OF DAILY LIVING (ADL): PREVIOUS_RESPONSIBILITIES: MEAL PREP;HOUSEKEEPING;LAUNDRY;MEDICATION MANAGEMENT;FINANCES

## 2017-05-21 NOTE — PLAN OF CARE
Have attempted to do therapy th ur- out  day and patient has either been unavailable or has refused. Nurse aware.

## 2017-05-21 NOTE — PLAN OF CARE
Problem: Goal Outcome Summary  Goal: Goal Outcome Summary  7B PT: CANCEL- per discussion with pt, having hard day, sitting on commode and consistently having loose stools. MD team called to clarify if she has any sternal precautions due to sternal infection and removal of manubrium. MD unclear and to place a patient care order in the chart to clarify.

## 2017-05-21 NOTE — PROGRESS NOTES
05/21/17 0922   Quick Adds   Type of Visit Initial Occupational Therapy Evaluation   Living Environment   Lives With spouse   Living Arrangements condominium   Home Accessibility grab bars present (bathtub);bed and bath on same level;grab bars present (toilet)   Number of Stairs to Enter Home 0   Number of Stairs Within Home 0   Stair Railings at Home none   Transportation Available family or friend will provide   Living Environment Comment No concerns per pt and family member.   Self-Care   Dominant Hand right   Usual Activity Tolerance moderate   Current Activity Tolerance fair   Regular Exercise yes   Activity/Exercise Type (PT in Home per pt report)   Exercise Amount/Frequency daily  (in Feb/March)   Equipment Currently Used at Home grab bar   Functional Level Prior   Ambulation 0-->independent   Transferring 0-->independent   Toileting 0-->independent   Bathing 0-->independent   Dressing 0-->independent   Eating 0-->independent   Communication 0-->understands/communicates without difficulty   Swallowing 0-->swallows foods/liquids without difficulty   Cognition 0 - no cognition issues reported   Fall history within last six months no   Prior Functional Level Comment Pt reports was IND prior to a few months ago when she had a series of hospital admissions.        Present no   Language english   General Information   Onset of Illness/Injury or Date of Surgery - Date 05/18/17   Referring Physician Nalini Barreto MD   Patient/Family Goals Statement To return home   Additional Occupational Profile Info/Pertinent History of Current Problem 72 year old female with PMH significant for an esophageal perforation. She is most recently status post a retrosternal gastric pull through with manubrium resection, spit fistula take-down and placement of a jejunostomy tube in April. She was discharged post-operatively from that procedure on 4/28 however readmitted from 5/3-5/8 with a pneumonia. She was then  readmitted on 5/18 for a chest wound infection.    Precautions/Limitations no known precautions/limitations   Weight-Bearing Status - LUE full weight-bearing   Weight-Bearing Status - RUE full weight-bearing   Weight-Bearing Status - LLE full weight-bearing   Weight-Bearing Status - RLE full weight-bearing   General Info Comments Pt and daughter assist in providing general information.    Cognitive Status Examination   Orientation orientation to person, place and time   Level of Consciousness alert   Able to Follow Commands WNL/WFL   Personal Safety (Cognitive) WNL/WFL   Cognitive Comment Grossly WFL   Visual Perception   Visual Perception Wears glasses   Visual Perception Comments Pt with reading glasses, able to read clock on wall   Sensory Examination   Sensory Quick Adds No deficits were identified   Pain Assessment   Patient Currently in Pain Yes, see Vital Sign flowsheet   Integumentary/Edema   Integumentary/Edema other (describe)   Integumentary/Edema Comments pitting edema in BLE   Range of Motion (ROM)   ROM Comment Pt is limited in Shoulder ROM, BUE    Strength   Strength Comments Weakness in RUE   Hand Strength   Hand Strength Comments grossly 4+/5 bilaterally   Coordination   Coordination Comments WNL with observation of manipulation of ADL objects.    Instrumental Activities of Daily Living (IADL)   Previous Responsibilities meal prep;housekeeping;laundry;medication management;finances   IADL Comments Can defer to spouse as needed.    Activities of Daily Living Analysis   Impairments Contributing to Impaired Activities of Daily Living pain;ROM decreased;strength decreased   General Therapy Interventions   Planned Therapy Interventions ADL retraining;IADL retraining;bed mobility training;manual therapy;neuromuscular re-education;ROM;strengthening;transfer training;home program guidelines;progressive activity/exercise   Clinical Impression   Criteria for Skilled Therapeutic Interventions Met yes,  "treatment indicated   OT Diagnosis Decreased ADL independence   Influenced by the following impairments weakness, decreased endurance, impaired ROM   Assessment of Occupational Performance 3-5 Performance Deficits   Identified Performance Deficits dressing, transfers, bed mobility   Clinical Decision Making (Complexity) Moderate complexity   Therapy Frequency 3 times/wk   Predicted Duration of Therapy Intervention (days/wks) 5/28/17   Risks and Benefits of Treatment have been explained. Yes   Patient, Family & other staff in agreement with plan of care Yes   Clinical Impression Comments Pt appropriate for skilled OT services.    [facility] [employer] TM \"6 Clicks\"   2016, Trustees of [facility], under license to [employer].  All rights reserved.   6 Clicks Short Forms Daily Activity Inpatient Short Form   [facility] AM-PAC  \"6 Clicks\" Daily Activity Inpatient Short Form   1. Putting on and taking off regular lower body clothing? 2 - A Lot   2. Bathing (including washing, rinsing, drying)? 2 - A Lot   3. Toileting, which includes using toilet, bedpan or urinal? 3 - A Little   4. Putting on and taking off regular upper body clothing? 3 - A Little   5. Taking care of personal grooming such as brushing teeth? 3 - A Little   6. Eating meals? 4 - None   Daily Activity Raw Score (Score out of 24.Lower scores equate to lower levels of function) 17   Total Evaluation Time   Total Evaluation Time (Minutes) 10     "

## 2017-05-21 NOTE — OP NOTE
DATE OF PROCEDURE:  2017.        PROCEDURE PERFORMED:  Esophagogastroscopy and incision, drainage and debridement of chest abscess.      I am dictating this case as cosurgeon with Dr. Wise.  I assisted with the initial endoscopy and with some parts of the procedure, needing debridement and access to the chest.      DETAILS OF PROCEDURE:  After induction of general anesthesia, and introduction of an endotracheal tube, an adult gastroscope was passed per orally.  On reaching the anastomosis there was obvious bubbling at the chest wound on insufflation.  We then changed over to a pediatric endoscope and passed the anastomotic area.  The conduit was viable; however, there was some sloughing of the anastomosis.  There was no obvious defect, however.  After this, I assisted with opening up the chest wound and making an incision in the third intercostal space on the right side of the chest in order to gain access to the abscess cavity which was drained and curetted clean.  A chest tube was then left in this space and secured to the skin and the open wounds were packed.        Please refer to Dr. Wise's operative report for further details.         CONCHIS MARRERO MD             D: 2017 17:12   T: 2017 23:58   MT:       Name:     FAVIAN MALONE   MRN:      [id number]        Account:        [id number]   :      1945           Procedure Date: 2017      Document: [id number]

## 2017-05-21 NOTE — PLAN OF CARE
Problem: Goal Outcome Summary  Goal: Goal Outcome Summary  Outcome: No Change  VSS, afebrile. Pain not controlled. IV Dilaudid given. Pt restless and unable to sleep. NPO. Swabs used with some relief of dry mouth. Biotene given. Chest dressing with moderate serosanguineous drainage. Reinforced with ABD pads. Up to commode independently. Continues to experience incontinent loose stools. Tubefeeding infusing. Titrated by patient as needed. Mood depressed. Family presence encouraged. Will continue with POC.

## 2017-05-21 NOTE — OP NOTE
DATE OF PROCEDURE:  2017.         PREOPERATIVE DIAGNOSES:   1.  Anastomotic leak post-retrosternal gastric pullup.   2.  Mediastinal abscess.      PROCEDURE PERFORMED:   1.  Incision and drainage of mediastinal wound.   2.  Drainage of mediastinal abscess through a Montrose incision.   3.  Mediastinal chest tube placement.   4.  Esophagogastroscopy.      SURGEON:  Nirali Lopez MD (present and participated in the entire procedure).      RESIDENT SURGEON:  Storm Whitlock MD.      ANESTHESIA:  General.      ESTIMATED BLOOD LOSS:  200 mL.      COMPLICATIONS:  None immediate.      FINDINGS:  There was an area of anastomotic breakdown that connected to the mediastinal cavity.  However, we could not navigate the scope through the perforation to the cavity.  The stomach appeared viable.      DESCRIPTION OF PROCEDURE:  With Favian Malone in supine position under general anesthesia, the chest and abdomen were prepared and draped in the conventional fashion.  We performed an esophagogastroscopy with the above-mentioned findings and could elicit bubbling through the open neck wound.  We then made a counterincision on the third intercostal space and excised a portion of the fourth cartilage as in a Montrose incision on the right side and tied off the internal mammary artery and then very easily entered the abscess cavity and connected it superiorly towards the open incision and inferiorly retrosternally.  We then placed a chest tube through the epigastrium in the retrosternal space inferiorly so that the entire cavity was connected and drained.  We irrigated copiously.      The patient tolerated the procedure well.         NIRALI LOPEZ MD             D: 2017 17:22   T: 2017 00:43   MT:       Name:     FAVIAN MALONE   MRN:      [id number]        Account:        [id number]   :      1945           Procedure Date: 2017      Document: [id number]       cc: [facility] Billing

## 2017-05-21 NOTE — PROGRESS NOTES
Thoracic Surgery Daily Progress Note  Minerva Blanco  05/21/2017    Subjective:   No acute events overnight. Transferred to the floor without issue. Was able to sleep overnight.     Objective:    /55 (BP Location: Left arm)  Pulse 92  Temp 97  F (36.1  C) (Oral)  Resp 18  Ht 1.524 m (5')  Wt 46.1 kg (101 lb 10.1 oz)  SpO2 97%  BMI 19.85 kg/m2  Laying comfortably in bed in no acute distress  Awake, alert and appropriate  Nonlabored breathing on room air  Regular rate and rhythm  Incisions packed, packing serosanguinous and removed. Purulent/serosanguiousn drainage at base, aspirated and redressed.   CT in place with minimal drainage.     Ins/Outs:   I/O last 3 completed shifts:  In: 1011 [I.V.:276; NG/GT:300]  Out: 730 [Urine:475; Other:250; Chest Tube:5]    Labs:   Reviewed.  Labs reviewed WBC 12.1 K 3.4    Assessment:  Minerva Blanco is a 72 year old female with complex past surgical history most recently status post a retrosternal gastric pull through, manubrium resection, spit fistula takedown, J-tube placement now with an anastomotic leak s/p I&D in OR on 5/19    Transition to enteral pain meds, IV for backup and dressing changes  Continue tube feeds, nutrition to see to assist with diarrhea  PICC placement today, then discontinue central line  Continue daily dressing changes to chest wounds  Continue fluconazole/zosyn for antibiotics  Plan for OR tomorrow for dressing change in OR    Seen and discussed with fellow, who discussed with staff.     Viki Carmen MD  General Surgery  Pager: [phone]

## 2017-05-21 NOTE — PLAN OF CARE
Problem: Goal Outcome Summary  Goal: Goal Outcome Summary  OT 7B: Eval complete, treatment indicated. Pt completes functional transfers SBA, bed mobility Min Ax1. Pt with fair standing tolerance, c/o thoracic pain at end of session.      REC: Pt d/c Home with home therapies to increase strength, endurance, activity tolerance when medically ready.

## 2017-05-21 NOTE — PLAN OF CARE
Problem: Goal Outcome Summary  Goal: Goal Outcome Summary  Outcome: No Change  /50 (BP Location: Left arm)  Pulse 92  Temp 99.9  F (37.7  C) (Oral)  Resp 20  Ht 1.524 m (5')  Wt 46.1 kg (101 lb 10.1 oz)  SpO2 96%  BMI 19.85 kg/m2     9304-4512: VSS, Afebrile.  Pt calm and cooperative overnight.  No complaints of nausea or SOB.  Pt having loose, urgent stools overnight.  Spent around 2 hours on the bedside commode due to inability to stop having BM.  Pt decreased tube feedings to 15ml/hr stated she does this at home and the last time she was here and this helps her decrease the amounts of stool.  Will increase again in the morning.  Triggered sepsis protocol during shift, lactic acid 0.9.  Mid chest packing with small amounts of blood leaking through reinforced dressing.  MD paged to assess, abd dressings changed by MD.  Chest tube to gravity drainage.  Pt reported no relief from PCA, MD ordered PRN PO medication.  Pt did not request PO medication. Will continue to monitor.

## 2017-05-22 ENCOUNTER — HOSPITAL ENCOUNTER (INPATIENT)
Dept: VASCULAR ULTRASOUND | Facility: CLINIC | Age: 72
DRG: 856 | End: 2017-05-22
Attending: THORACIC SURGERY (CARDIOTHORACIC VASCULAR SURGERY) | Admitting: THORACIC SURGERY (CARDIOTHORACIC VASCULAR SURGERY)
Payer: MEDICARE

## 2017-05-22 LAB
ANION GAP SERPL CALCULATED.3IONS-SCNC: 6 MMOL/L (ref 3–14)
BASOPHILS # BLD AUTO: 0 10E9/L (ref 0–0.2)
BASOPHILS NFR BLD AUTO: 0.2 %
BUN SERPL-MCNC: 8 MG/DL (ref 7–30)
CALCIUM SERPL-MCNC: 7.8 MG/DL (ref 8.5–10.1)
CHLORIDE SERPL-SCNC: 108 MMOL/L (ref 94–109)
CO2 SERPL-SCNC: 28 MMOL/L (ref 20–32)
CREAT SERPL-MCNC: 0.42 MG/DL (ref 0.52–1.04)
DIFFERENTIAL METHOD BLD: ABNORMAL
EOSINOPHIL # BLD AUTO: 0.6 10E9/L (ref 0–0.7)
EOSINOPHIL NFR BLD AUTO: 4.4 %
ERYTHROCYTE [DISTWIDTH] IN BLOOD BY AUTOMATED COUNT: 16.1 % (ref 10–15)
GFR SERPL CREATININE-BSD FRML MDRD: ABNORMAL ML/MIN/1.7M2
GLUCOSE BLDC GLUCOMTR-MCNC: 78 MG/DL (ref 70–99)
GLUCOSE BLDC GLUCOMTR-MCNC: 78 MG/DL (ref 70–99)
GLUCOSE SERPL-MCNC: 71 MG/DL (ref 70–99)
HCT VFR BLD AUTO: 23.6 % (ref 35–47)
HGB BLD-MCNC: 7.1 G/DL (ref 11.7–15.7)
IMM GRANULOCYTES # BLD: 0.1 10E9/L (ref 0–0.4)
IMM GRANULOCYTES NFR BLD: 0.6 %
LYMPHOCYTES # BLD AUTO: 1.1 10E9/L (ref 0.8–5.3)
LYMPHOCYTES NFR BLD AUTO: 8.7 %
MAGNESIUM SERPL-MCNC: 2 MG/DL (ref 1.6–2.3)
MCH RBC QN AUTO: 26.9 PG (ref 26.5–33)
MCHC RBC AUTO-ENTMCNC: 30.1 G/DL (ref 31.5–36.5)
MCV RBC AUTO: 89 FL (ref 78–100)
MONOCYTES # BLD AUTO: 1.1 10E9/L (ref 0–1.3)
MONOCYTES NFR BLD AUTO: 8.4 %
NEUTROPHILS # BLD AUTO: 9.6 10E9/L (ref 1.6–8.3)
NEUTROPHILS NFR BLD AUTO: 77.7 %
NRBC # BLD AUTO: 0 10*3/UL
NRBC BLD AUTO-RTO: 0 /100
PHOSPHATE SERPL-MCNC: 3 MG/DL (ref 2.5–4.5)
PLATELET # BLD AUTO: 565 10E9/L (ref 150–450)
POTASSIUM SERPL-SCNC: 4.3 MMOL/L (ref 3.4–5.3)
RBC # BLD AUTO: 2.64 10E12/L (ref 3.8–5.2)
SODIUM SERPL-SCNC: 141 MMOL/L (ref 133–144)
WBC # BLD AUTO: 12.4 10E9/L (ref 4–11)

## 2017-05-22 PROCEDURE — 25000132 ZZH RX MED GY IP 250 OP 250 PS 637: Mod: GY | Performed by: SURGERY

## 2017-05-22 PROCEDURE — 27210437 ZZH NUTRITION PRODUCT SEMIELEM INTERMED LITER

## 2017-05-22 PROCEDURE — 80048 BASIC METABOLIC PNL TOTAL CA: CPT | Performed by: SURGERY

## 2017-05-22 PROCEDURE — 25000128 H RX IP 250 OP 636: Performed by: SURGERY

## 2017-05-22 PROCEDURE — 83735 ASSAY OF MAGNESIUM: CPT | Performed by: SURGERY

## 2017-05-22 PROCEDURE — A9270 NON-COVERED ITEM OR SERVICE: HCPCS | Mod: GY | Performed by: SURGERY

## 2017-05-22 PROCEDURE — 84100 ASSAY OF PHOSPHORUS: CPT | Performed by: SURGERY

## 2017-05-22 PROCEDURE — 85025 COMPLETE CBC W/AUTO DIFF WBC: CPT | Performed by: SURGERY

## 2017-05-22 PROCEDURE — 36592 COLLECT BLOOD FROM PICC: CPT | Performed by: SURGERY

## 2017-05-22 PROCEDURE — 00000146 ZZHCL STATISTIC GLUCOSE BY METER IP

## 2017-05-22 PROCEDURE — 25000128 H RX IP 250 OP 636: Performed by: STUDENT IN AN ORGANIZED HEALTH CARE EDUCATION/TRAINING PROGRAM

## 2017-05-22 PROCEDURE — 25000125 ZZHC RX 250: Performed by: SURGERY

## 2017-05-22 PROCEDURE — 36569 INSJ PICC 5 YR+ W/O IMAGING: CPT

## 2017-05-22 PROCEDURE — A9270 NON-COVERED ITEM OR SERVICE: HCPCS | Mod: GY | Performed by: THORACIC SURGERY (CARDIOTHORACIC VASCULAR SURGERY)

## 2017-05-22 PROCEDURE — C1751 CATH, INF, PER/CENT/MIDLINE: HCPCS

## 2017-05-22 PROCEDURE — 12000003 ZZH R&B CRITICAL UMMC

## 2017-05-22 PROCEDURE — 25000128 H RX IP 250 OP 636: Performed by: THORACIC SURGERY (CARDIOTHORACIC VASCULAR SURGERY)

## 2017-05-22 PROCEDURE — 25000132 ZZH RX MED GY IP 250 OP 250 PS 637: Mod: GY | Performed by: THORACIC SURGERY (CARDIOTHORACIC VASCULAR SURGERY)

## 2017-05-22 RX ORDER — SODIUM CHLORIDE 9 MG/ML
INJECTION, SOLUTION INTRAVENOUS CONTINUOUS
Status: DISCONTINUED | OUTPATIENT
Start: 2017-05-22 | End: 2017-05-24

## 2017-05-22 RX ADMIN — FLUCONAZOLE 200 MG: 2 INJECTION INTRAVENOUS at 19:35

## 2017-05-22 RX ADMIN — METOPROLOL TARTRATE 25 MG: 100 TABLET ORAL at 09:46

## 2017-05-22 RX ADMIN — HEPARIN SODIUM 5000 UNITS: 5000 INJECTION, SOLUTION INTRAVENOUS; SUBCUTANEOUS at 05:04

## 2017-05-22 RX ADMIN — OXYCODONE HYDROCHLORIDE 15 MG: 5 SOLUTION ORAL at 22:09

## 2017-05-22 RX ADMIN — GABAPENTIN 300 MG: 250 SOLUTION ORAL at 05:04

## 2017-05-22 RX ADMIN — OXYCODONE HYDROCHLORIDE 15 MG: 5 SOLUTION ORAL at 18:12

## 2017-05-22 RX ADMIN — PIPERACILLIN SODIUM,TAZOBACTAM SODIUM 3.38 G: 3; .375 INJECTION, POWDER, FOR SOLUTION INTRAVENOUS at 16:43

## 2017-05-22 RX ADMIN — HEPARIN SODIUM 5000 UNITS: 5000 INJECTION, SOLUTION INTRAVENOUS; SUBCUTANEOUS at 19:23

## 2017-05-22 RX ADMIN — Medication 6 MG: at 21:15

## 2017-05-22 RX ADMIN — PIPERACILLIN SODIUM,TAZOBACTAM SODIUM 3.38 G: 3; .375 INJECTION, POWDER, FOR SOLUTION INTRAVENOUS at 09:43

## 2017-05-22 RX ADMIN — HYDROMORPHONE HYDROCHLORIDE 0.5 MG: 1 INJECTION, SOLUTION INTRAMUSCULAR; INTRAVENOUS; SUBCUTANEOUS at 11:46

## 2017-05-22 RX ADMIN — OXYCODONE HYDROCHLORIDE 15 MG: 5 SOLUTION ORAL at 10:11

## 2017-05-22 RX ADMIN — PANTOPRAZOLE SODIUM 40 MG: 40 TABLET, DELAYED RELEASE ORAL at 09:46

## 2017-05-22 RX ADMIN — GABAPENTIN 300 MG: 250 SOLUTION ORAL at 19:23

## 2017-05-22 RX ADMIN — RANITIDINE HYDROCHLORIDE 150 MG: 150 SOLUTION ORAL at 09:46

## 2017-05-22 RX ADMIN — Medication 6 MG: at 16:29

## 2017-05-22 RX ADMIN — HYDROMORPHONE HYDROCHLORIDE 0.5 MG: 1 INJECTION, SOLUTION INTRAMUSCULAR; INTRAVENOUS; SUBCUTANEOUS at 08:38

## 2017-05-22 RX ADMIN — METOPROLOL TARTRATE 25 MG: 100 TABLET ORAL at 19:22

## 2017-05-22 RX ADMIN — PIPERACILLIN SODIUM,TAZOBACTAM SODIUM 3.38 G: 3; .375 INJECTION, POWDER, FOR SOLUTION INTRAVENOUS at 05:05

## 2017-05-22 RX ADMIN — OXYCODONE HYDROCHLORIDE 15 MG: 5 SOLUTION ORAL at 00:28

## 2017-05-22 RX ADMIN — TRAZODONE HYDROCHLORIDE 50 MG: 50 TABLET ORAL at 21:15

## 2017-05-22 RX ADMIN — HYDROMORPHONE HYDROCHLORIDE 0.5 MG: 1 INJECTION, SOLUTION INTRAMUSCULAR; INTRAVENOUS; SUBCUTANEOUS at 19:24

## 2017-05-22 RX ADMIN — Medication 6 MG: at 09:56

## 2017-05-22 RX ADMIN — LIDOCAINE HYDROCHLORIDE 5 ML: 10 INJECTION, SOLUTION EPIDURAL; INFILTRATION; INTRACAUDAL; PERINEURAL at 16:10

## 2017-05-22 RX ADMIN — HYDROMORPHONE HYDROCHLORIDE 0.5 MG: 1 INJECTION, SOLUTION INTRAMUSCULAR; INTRAVENOUS; SUBCUTANEOUS at 16:29

## 2017-05-22 RX ADMIN — OXYCODONE HYDROCHLORIDE 15 MG: 5 SOLUTION ORAL at 05:04

## 2017-05-22 RX ADMIN — OXYCODONE HYDROCHLORIDE 15 MG: 5 SOLUTION ORAL at 14:05

## 2017-05-22 RX ADMIN — PIPERACILLIN SODIUM,TAZOBACTAM SODIUM 3.38 G: 3; .375 INJECTION, POWDER, FOR SOLUTION INTRAVENOUS at 21:15

## 2017-05-22 RX ADMIN — LOPERAMIDE HYDROCHLORIDE 4 MG: 1 SOLUTION ORAL at 09:46

## 2017-05-22 RX ADMIN — SODIUM CHLORIDE: 9 INJECTION, SOLUTION INTRAVENOUS at 21:17

## 2017-05-22 RX ADMIN — LOPERAMIDE HYDROCHLORIDE 4 MG: 1 SOLUTION ORAL at 19:22

## 2017-05-22 RX ADMIN — GABAPENTIN 300 MG: 250 SOLUTION ORAL at 13:13

## 2017-05-22 RX ADMIN — Medication 6 MG: at 05:04

## 2017-05-22 RX ADMIN — SODIUM CHLORIDE: 9 INJECTION, SOLUTION INTRAVENOUS at 01:26

## 2017-05-22 NOTE — PLAN OF CARE
Problem: Goal Outcome Summary  Goal: Goal Outcome Summary  Vital signs:  Temp: 98.3  F (36.8  C) Temp src: Oral BP: 119/59   Heart Rate: 78 Resp: 18 SpO2: 98 % O2 Device: None (Room air)   VSS. Sternal wound covered with dressing, reinforced on evenings, no further drainage overnight. C/O sternal pain, oxycodone administered x 2. Denies nausea. TF's held overnight, NPO with fluids infusing at 100 mL/hr in preparation for OR today. Meds through J tube, clamped between meds. CT to ojeda bag, no output overnight. Up to commode independently. Diarrhea x 1, voided x 2. Sleeping throughout the night.

## 2017-05-22 NOTE — PROGRESS NOTES
First picc attempt in left medial brachial, access obtained, wire threaded with out difficulty, unable to pass catheter, a 5fr, 4 fr and a 3 fr.  Access obtained in lateral.  brachial, wire and catheter threaded without difficulty.

## 2017-05-22 NOTE — PLAN OF CARE
Problem: Goal Outcome Summary  Goal: Goal Outcome Summary  OT/7B - Cancel. Pt OOR for PICC placement and anticipate OR this afternoon for procedure. Will reschedule as appropriate.

## 2017-05-22 NOTE — PROGRESS NOTES
Thoracic surgery progress note    No acute evens overnight. Pain controlled. Tolerating tube feeds.     Temp: 99  F (37.2  C) Temp  Min: 96.3  F (35.7  C)  Max: 99  F (37.2  C)  Resp: 18 Resp  Min: 18  Max: 18  SpO2: 90 % SpO2  Min: 90 %  Max: 98 %    No Data Recorded  Heart Rate: 86 Heart Rate  Min: 75  Max: 86  BP: 121/63 Systolic (24hrs), Av , Min:111 , Max:135   Diastolic (24hrs), Av, Min:53, Max:63    A&O, NAD  Unlabored breathing on RA  RRR  Dressing c/d/i  CT in place w/ scant output to gravity    I&O (24/overnight)  Chest tube 15/0    Labs  WBC 12.4 <- 12.1  Hgb 7.1 <-7.4    A/P: 72F w/ AFib on Coumadin and complex surgical history most recently s/p lap retrosternal gastric pull-through, manubrium resection, spit fistula takedown c/b anastomotic leak and substernal abscess s/p I&D and mediastinal tube placement on     - NPO. C/w TF at goal  - OR today for EGD, chest I&D  - C/w Zosyn/Fluc  - Chest tube to gravity  - Protonix QDay  - Home metoprolol, trazodone, gabapentin  - Heparin SQ    Seen w/ team    Kd Liu MD  Surgery PGY1  [phone]

## 2017-05-22 NOTE — PLAN OF CARE
Problem: Goal Outcome Summary  Goal: Goal Outcome Summary  Outcome: No Change     VSS. Pain at tolerable level with PRN Oxycodone and Dilaudid IV. Tolerating TF at 15 ml/hr per J tube. . K+ and magnesium replaced. Frequent loose stools on commode. Sternal wound oozing thick opaque serosanguinous drainge , dressing reinforced with ABD pads x2. CT to gravity with small amount of drainage. IV Zocyn and Deflucan given. Plan is for sternal wound debridement and dressing change in OR and possible PICC line placement. NPO. Skin prep x1 done. Continue to monitor.

## 2017-05-22 NOTE — PLAN OF CARE
Problem: Goal Outcome Summary  Goal: Goal Outcome Summary  PT 7B: PT consult orders received and appreciated. Per chart review and discussion with OT, patient may not have acute PT needs. PT will hold; needs may be able to be addressed with one discipline, OT to follow and initiate PT if needs arise.

## 2017-05-22 NOTE — PLAN OF CARE
Problem: Goal Outcome Summary  Goal: Goal Outcome Summary  Outcome: Improving  Dressings on chest reinforced 3 times this shift due to drainage. Skin very sensitive and minimal taping to skin. Weaver straps ordered. Up to commode with loose stool and urine mixed. Continues to have pain; Oxycodone 15mg x2 and Dilaudid 0.5mg given x2. Pain is skin on chest and j-tube site. Medication given through j-tube. Pt left for PICC line insertion approximately 1500. She is an add-on case today for debridement of her chest wounds. Pt emotional at times. Her  was here today to visit.

## 2017-05-23 ENCOUNTER — ANESTHESIA EVENT (OUTPATIENT)
Dept: SURGERY | Facility: CLINIC | Age: 72
DRG: 856 | End: 2017-05-23
Payer: MEDICARE

## 2017-05-23 ENCOUNTER — APPOINTMENT (OUTPATIENT)
Dept: OCCUPATIONAL THERAPY | Facility: CLINIC | Age: 72
DRG: 856 | End: 2017-05-23
Attending: STUDENT IN AN ORGANIZED HEALTH CARE EDUCATION/TRAINING PROGRAM
Payer: MEDICARE

## 2017-05-23 ENCOUNTER — HOSPITAL ENCOUNTER (INPATIENT)
Facility: CLINIC | Age: 72
Setting detail: SURGERY ADMIT
DRG: 856 | End: 2017-05-23
Attending: STUDENT IN AN ORGANIZED HEALTH CARE EDUCATION/TRAINING PROGRAM | Admitting: STUDENT IN AN ORGANIZED HEALTH CARE EDUCATION/TRAINING PROGRAM
Payer: MEDICARE

## 2017-05-23 ENCOUNTER — ANESTHESIA (OUTPATIENT)
Dept: SURGERY | Facility: CLINIC | Age: 72
DRG: 856 | End: 2017-05-23
Payer: MEDICARE

## 2017-05-23 DIAGNOSIS — T81.31XS WOUND DEHISCENCE, SURGICAL, SEQUELA: Primary | ICD-10-CM

## 2017-05-23 LAB
ABO + RH BLD: NORMAL
ABO + RH BLD: NORMAL
BASOPHILS # BLD AUTO: 0 10E9/L (ref 0–0.2)
BASOPHILS NFR BLD AUTO: 0.2 %
BLD GP AB SCN SERPL QL: NORMAL
BLOOD BANK CMNT PATIENT-IMP: NORMAL
DIFFERENTIAL METHOD BLD: ABNORMAL
EOSINOPHIL # BLD AUTO: 0.4 10E9/L (ref 0–0.7)
EOSINOPHIL NFR BLD AUTO: 3.3 %
ERYTHROCYTE [DISTWIDTH] IN BLOOD BY AUTOMATED COUNT: 16.2 % (ref 10–15)
GLUCOSE BLDC GLUCOMTR-MCNC: 85 MG/DL (ref 70–99)
GLUCOSE BLDC GLUCOMTR-MCNC: 86 MG/DL (ref 70–99)
GLUCOSE BLDC GLUCOMTR-MCNC: 87 MG/DL (ref 70–99)
HCT VFR BLD AUTO: 26.3 % (ref 35–47)
HGB BLD-MCNC: 8.1 G/DL (ref 11.7–15.7)
IMM GRANULOCYTES # BLD: 0.1 10E9/L (ref 0–0.4)
IMM GRANULOCYTES NFR BLD: 0.7 %
LYMPHOCYTES # BLD AUTO: 1.3 10E9/L (ref 0.8–5.3)
LYMPHOCYTES NFR BLD AUTO: 10.3 %
MCH RBC QN AUTO: 27.5 PG (ref 26.5–33)
MCHC RBC AUTO-ENTMCNC: 30.8 G/DL (ref 31.5–36.5)
MCV RBC AUTO: 89 FL (ref 78–100)
MONOCYTES # BLD AUTO: 1.1 10E9/L (ref 0–1.3)
MONOCYTES NFR BLD AUTO: 8.3 %
NEUTROPHILS # BLD AUTO: 9.9 10E9/L (ref 1.6–8.3)
NEUTROPHILS NFR BLD AUTO: 77.2 %
NRBC # BLD AUTO: 0 10*3/UL
NRBC BLD AUTO-RTO: 0 /100
PLATELET # BLD AUTO: 638 10E9/L (ref 150–450)
RBC # BLD AUTO: 2.95 10E12/L (ref 3.8–5.2)
SPECIMEN EXP DATE BLD: NORMAL
WBC # BLD AUTO: 12.9 10E9/L (ref 4–11)

## 2017-05-23 PROCEDURE — 25000128 H RX IP 250 OP 636: Performed by: NURSE ANESTHETIST, CERTIFIED REGISTERED

## 2017-05-23 PROCEDURE — C9399 UNCLASSIFIED DRUGS OR BIOLOG: HCPCS | Performed by: NURSE ANESTHETIST, CERTIFIED REGISTERED

## 2017-05-23 PROCEDURE — 40000133 ZZH STATISTIC OT WARD VISIT

## 2017-05-23 PROCEDURE — 00000146 ZZHCL STATISTIC GLUCOSE BY METER IP

## 2017-05-23 PROCEDURE — 36000057 ZZH SURGERY LEVEL 3 1ST 30 MIN - UMMC: Performed by: STUDENT IN AN ORGANIZED HEALTH CARE EDUCATION/TRAINING PROGRAM

## 2017-05-23 PROCEDURE — 36592 COLLECT BLOOD FROM PICC: CPT | Performed by: SURGERY

## 2017-05-23 PROCEDURE — A9270 NON-COVERED ITEM OR SERVICE: HCPCS | Mod: GY | Performed by: THORACIC SURGERY (CARDIOTHORACIC VASCULAR SURGERY)

## 2017-05-23 PROCEDURE — 25000128 H RX IP 250 OP 636: Performed by: SURGERY

## 2017-05-23 PROCEDURE — 0JD40ZZ EXTRACTION OF RIGHT NECK SUBCUTANEOUS TISSUE AND FASCIA, OPEN APPROACH: ICD-10-PCS | Performed by: STUDENT IN AN ORGANIZED HEALTH CARE EDUCATION/TRAINING PROGRAM

## 2017-05-23 PROCEDURE — 36000059 ZZH SURGERY LEVEL 3 EA 15 ADDTL MIN UMMC: Performed by: STUDENT IN AN ORGANIZED HEALTH CARE EDUCATION/TRAINING PROGRAM

## 2017-05-23 PROCEDURE — 71000015 ZZH RECOVERY PHASE 1 LEVEL 2 EA ADDTL HR: Performed by: STUDENT IN AN ORGANIZED HEALTH CARE EDUCATION/TRAINING PROGRAM

## 2017-05-23 PROCEDURE — 37000009 ZZH ANESTHESIA TECHNICAL FEE, EACH ADDTL 15 MIN: Performed by: STUDENT IN AN ORGANIZED HEALTH CARE EDUCATION/TRAINING PROGRAM

## 2017-05-23 PROCEDURE — A9270 NON-COVERED ITEM OR SERVICE: HCPCS | Mod: GY | Performed by: SURGERY

## 2017-05-23 PROCEDURE — 37000008 ZZH ANESTHESIA TECHNICAL FEE, 1ST 30 MIN: Performed by: STUDENT IN AN ORGANIZED HEALTH CARE EDUCATION/TRAINING PROGRAM

## 2017-05-23 PROCEDURE — 86901 BLOOD TYPING SEROLOGIC RH(D): CPT | Performed by: NURSE ANESTHETIST, CERTIFIED REGISTERED

## 2017-05-23 PROCEDURE — 86850 RBC ANTIBODY SCREEN: CPT | Performed by: NURSE ANESTHETIST, CERTIFIED REGISTERED

## 2017-05-23 PROCEDURE — 0JD60ZZ EXTRACTION OF CHEST SUBCUTANEOUS TISSUE AND FASCIA, OPEN APPROACH: ICD-10-PCS | Performed by: STUDENT IN AN ORGANIZED HEALTH CARE EDUCATION/TRAINING PROGRAM

## 2017-05-23 PROCEDURE — 12000003 ZZH R&B CRITICAL UMMC

## 2017-05-23 PROCEDURE — 71000014 ZZH RECOVERY PHASE 1 LEVEL 2 FIRST HR: Performed by: STUDENT IN AN ORGANIZED HEALTH CARE EDUCATION/TRAINING PROGRAM

## 2017-05-23 PROCEDURE — 25000125 ZZHC RX 250: Performed by: NURSE ANESTHETIST, CERTIFIED REGISTERED

## 2017-05-23 PROCEDURE — 25000132 ZZH RX MED GY IP 250 OP 250 PS 637: Mod: GY | Performed by: THORACIC SURGERY (CARDIOTHORACIC VASCULAR SURGERY)

## 2017-05-23 PROCEDURE — 25000566 ZZH SEVOFLURANE, EA 15 MIN: Performed by: STUDENT IN AN ORGANIZED HEALTH CARE EDUCATION/TRAINING PROGRAM

## 2017-05-23 PROCEDURE — 40000170 ZZH STATISTIC PRE-PROCEDURE ASSESSMENT II: Performed by: STUDENT IN AN ORGANIZED HEALTH CARE EDUCATION/TRAINING PROGRAM

## 2017-05-23 PROCEDURE — 25000128 H RX IP 250 OP 636: Performed by: THORACIC SURGERY (CARDIOTHORACIC VASCULAR SURGERY)

## 2017-05-23 PROCEDURE — 27210437 ZZH NUTRITION PRODUCT SEMIELEM INTERMED LITER

## 2017-05-23 PROCEDURE — 97535 SELF CARE MNGMENT TRAINING: CPT | Mod: GO

## 2017-05-23 PROCEDURE — 85025 COMPLETE CBC W/AUTO DIFF WBC: CPT | Performed by: SURGERY

## 2017-05-23 PROCEDURE — 25000132 ZZH RX MED GY IP 250 OP 250 PS 637: Mod: GY | Performed by: SURGERY

## 2017-05-23 PROCEDURE — 25000128 H RX IP 250 OP 636: Performed by: STUDENT IN AN ORGANIZED HEALTH CARE EDUCATION/TRAINING PROGRAM

## 2017-05-23 PROCEDURE — 40000558 ZZH STATISTIC CVC DRESSING CHANGE

## 2017-05-23 PROCEDURE — 86900 BLOOD TYPING SEROLOGIC ABO: CPT | Performed by: NURSE ANESTHETIST, CERTIFIED REGISTERED

## 2017-05-23 PROCEDURE — 25000125 ZZHC RX 250: Performed by: ANESTHESIOLOGY

## 2017-05-23 PROCEDURE — 0DJ08ZZ INSPECTION OF UPPER INTESTINAL TRACT, VIA NATURAL OR ARTIFICIAL OPENING ENDOSCOPIC: ICD-10-PCS | Performed by: STUDENT IN AN ORGANIZED HEALTH CARE EDUCATION/TRAINING PROGRAM

## 2017-05-23 PROCEDURE — 27210794 ZZH OR GENERAL SUPPLY STERILE: Performed by: STUDENT IN AN ORGANIZED HEALTH CARE EDUCATION/TRAINING PROGRAM

## 2017-05-23 RX ORDER — EPHEDRINE SULFATE 50 MG/ML
INJECTION, SOLUTION INTRAMUSCULAR; INTRAVENOUS; SUBCUTANEOUS PRN
Status: DISCONTINUED | OUTPATIENT
Start: 2017-05-23 | End: 2017-05-23

## 2017-05-23 RX ORDER — LIDOCAINE HYDROCHLORIDE 20 MG/ML
INJECTION, SOLUTION INFILTRATION; PERINEURAL PRN
Status: DISCONTINUED | OUTPATIENT
Start: 2017-05-23 | End: 2017-05-23

## 2017-05-23 RX ORDER — PROPOFOL 10 MG/ML
INJECTION, EMULSION INTRAVENOUS PRN
Status: DISCONTINUED | OUTPATIENT
Start: 2017-05-23 | End: 2017-05-23

## 2017-05-23 RX ORDER — FENTANYL CITRATE 50 UG/ML
25-50 INJECTION, SOLUTION INTRAMUSCULAR; INTRAVENOUS
Status: DISCONTINUED | OUTPATIENT
Start: 2017-05-23 | End: 2017-05-23 | Stop reason: HOSPADM

## 2017-05-23 RX ORDER — NICOTINE POLACRILEX 4 MG
15-30 LOZENGE BUCCAL
Status: DISCONTINUED | OUTPATIENT
Start: 2017-05-23 | End: 2017-06-29 | Stop reason: HOSPADM

## 2017-05-23 RX ORDER — CEFAZOLIN SODIUM 2 G/100ML
2 INJECTION, SOLUTION INTRAVENOUS
Status: CANCELLED | OUTPATIENT
Start: 2017-05-23

## 2017-05-23 RX ORDER — CEFAZOLIN SODIUM 1 G/3ML
1 INJECTION, POWDER, FOR SOLUTION INTRAMUSCULAR; INTRAVENOUS SEE ADMIN INSTRUCTIONS
Status: CANCELLED | OUTPATIENT
Start: 2017-05-23

## 2017-05-23 RX ORDER — DEXTROSE MONOHYDRATE 25 G/50ML
25-50 INJECTION, SOLUTION INTRAVENOUS
Status: DISCONTINUED | OUTPATIENT
Start: 2017-05-23 | End: 2017-06-29 | Stop reason: HOSPADM

## 2017-05-23 RX ORDER — ONDANSETRON 4 MG/1
4 TABLET, ORALLY DISINTEGRATING ORAL EVERY 30 MIN PRN
Status: DISCONTINUED | OUTPATIENT
Start: 2017-05-23 | End: 2017-05-23 | Stop reason: HOSPADM

## 2017-05-23 RX ORDER — ONDANSETRON 2 MG/ML
4 INJECTION INTRAMUSCULAR; INTRAVENOUS EVERY 30 MIN PRN
Status: DISCONTINUED | OUTPATIENT
Start: 2017-05-23 | End: 2017-05-23 | Stop reason: HOSPADM

## 2017-05-23 RX ORDER — FENTANYL CITRATE 50 UG/ML
INJECTION, SOLUTION INTRAMUSCULAR; INTRAVENOUS PRN
Status: DISCONTINUED | OUTPATIENT
Start: 2017-05-23 | End: 2017-05-23

## 2017-05-23 RX ORDER — SODIUM CHLORIDE, SODIUM LACTATE, POTASSIUM CHLORIDE, CALCIUM CHLORIDE 600; 310; 30; 20 MG/100ML; MG/100ML; MG/100ML; MG/100ML
INJECTION, SOLUTION INTRAVENOUS CONTINUOUS
Status: DISCONTINUED | OUTPATIENT
Start: 2017-05-23 | End: 2017-05-23 | Stop reason: HOSPADM

## 2017-05-23 RX ADMIN — FLUCONAZOLE 200 MG: 2 INJECTION INTRAVENOUS at 20:51

## 2017-05-23 RX ADMIN — SODIUM CHLORIDE: 9 INJECTION, SOLUTION INTRAVENOUS at 13:54

## 2017-05-23 RX ADMIN — SODIUM CHLORIDE: 9 INJECTION, SOLUTION INTRAVENOUS at 11:29

## 2017-05-23 RX ADMIN — PHENYLEPHRINE HYDROCHLORIDE 50 MCG: 10 INJECTION, SOLUTION INTRAMUSCULAR; INTRAVENOUS; SUBCUTANEOUS at 12:01

## 2017-05-23 RX ADMIN — PIPERACILLIN SODIUM,TAZOBACTAM SODIUM 3.38 G: 3; .375 INJECTION, POWDER, FOR SOLUTION INTRAVENOUS at 16:50

## 2017-05-23 RX ADMIN — HYDROMORPHONE HYDROCHLORIDE 0.5 MG: 1 INJECTION, SOLUTION INTRAMUSCULAR; INTRAVENOUS; SUBCUTANEOUS at 07:40

## 2017-05-23 RX ADMIN — PIPERACILLIN SODIUM,TAZOBACTAM SODIUM 3.38 G: 3; .375 INJECTION, POWDER, FOR SOLUTION INTRAVENOUS at 11:40

## 2017-05-23 RX ADMIN — HYDROMORPHONE HYDROCHLORIDE 0.5 MG: 1 INJECTION, SOLUTION INTRAMUSCULAR; INTRAVENOUS; SUBCUTANEOUS at 19:22

## 2017-05-23 RX ADMIN — SUGAMMADEX 100 MG: 100 INJECTION, SOLUTION INTRAVENOUS at 12:33

## 2017-05-23 RX ADMIN — PHENYLEPHRINE HYDROCHLORIDE 50 MCG: 10 INJECTION, SOLUTION INTRAMUSCULAR; INTRAVENOUS; SUBCUTANEOUS at 11:58

## 2017-05-23 RX ADMIN — PIPERACILLIN SODIUM,TAZOBACTAM SODIUM 3.38 G: 3; .375 INJECTION, POWDER, FOR SOLUTION INTRAVENOUS at 04:24

## 2017-05-23 RX ADMIN — SODIUM CHLORIDE: 9 INJECTION, SOLUTION INTRAVENOUS at 19:24

## 2017-05-23 RX ADMIN — HEPARIN SODIUM 5000 UNITS: 5000 INJECTION, SOLUTION INTRAVENOUS; SUBCUTANEOUS at 20:54

## 2017-05-23 RX ADMIN — LOPERAMIDE HYDROCHLORIDE 4 MG: 1 SOLUTION ORAL at 20:54

## 2017-05-23 RX ADMIN — PHENYLEPHRINE HYDROCHLORIDE 50 MCG: 10 INJECTION, SOLUTION INTRAMUSCULAR; INTRAVENOUS; SUBCUTANEOUS at 12:13

## 2017-05-23 RX ADMIN — PANTOPRAZOLE SODIUM 40 MG: 40 TABLET, DELAYED RELEASE ORAL at 17:29

## 2017-05-23 RX ADMIN — PROPOFOL 70 MG: 10 INJECTION, EMULSION INTRAVENOUS at 11:45

## 2017-05-23 RX ADMIN — OXYCODONE HYDROCHLORIDE 15 MG: 5 SOLUTION ORAL at 08:59

## 2017-05-23 RX ADMIN — FENTANYL CITRATE 50 MCG: 50 INJECTION, SOLUTION INTRAMUSCULAR; INTRAVENOUS at 13:39

## 2017-05-23 RX ADMIN — ROCURONIUM BROMIDE 40 MG: 10 INJECTION INTRAVENOUS at 11:45

## 2017-05-23 RX ADMIN — LIDOCAINE HYDROCHLORIDE 60 MG: 20 INJECTION, SOLUTION INFILTRATION; PERINEURAL at 11:45

## 2017-05-23 RX ADMIN — FENTANYL CITRATE 50 MCG: 50 INJECTION, SOLUTION INTRAMUSCULAR; INTRAVENOUS at 11:45

## 2017-05-23 RX ADMIN — Medication 6 MG: at 04:25

## 2017-05-23 RX ADMIN — HYDROMORPHONE HYDROCHLORIDE 0.5 MG: 1 INJECTION, SOLUTION INTRAMUSCULAR; INTRAVENOUS; SUBCUTANEOUS at 01:15

## 2017-05-23 RX ADMIN — Medication 5 ML: at 01:15

## 2017-05-23 RX ADMIN — OXYCODONE HYDROCHLORIDE 15 MG: 5 SOLUTION ORAL at 21:21

## 2017-05-23 RX ADMIN — GABAPENTIN 300 MG: 250 SOLUTION ORAL at 04:25

## 2017-05-23 RX ADMIN — LOPERAMIDE HYDROCHLORIDE 4 MG: 1 SOLUTION ORAL at 09:06

## 2017-05-23 RX ADMIN — OXYCODONE HYDROCHLORIDE 15 MG: 5 SOLUTION ORAL at 04:29

## 2017-05-23 RX ADMIN — PIPERACILLIN SODIUM,TAZOBACTAM SODIUM 3.38 G: 3; .375 INJECTION, POWDER, FOR SOLUTION INTRAVENOUS at 22:06

## 2017-05-23 RX ADMIN — OXYCODONE HYDROCHLORIDE 10 MG: 5 SOLUTION ORAL at 17:28

## 2017-05-23 RX ADMIN — HEPARIN SODIUM 5000 UNITS: 5000 INJECTION, SOLUTION INTRAVENOUS; SUBCUTANEOUS at 04:24

## 2017-05-23 RX ADMIN — METOPROLOL TARTRATE 25 MG: 100 TABLET ORAL at 20:54

## 2017-05-23 RX ADMIN — Medication 5 MG: at 11:56

## 2017-05-23 RX ADMIN — Medication 6 MG: at 17:29

## 2017-05-23 RX ADMIN — TRAZODONE HYDROCHLORIDE 50 MG: 50 TABLET ORAL at 21:21

## 2017-05-23 RX ADMIN — METOPROLOL TARTRATE 25 MG: 100 TABLET ORAL at 09:00

## 2017-05-23 RX ADMIN — GABAPENTIN 300 MG: 250 SOLUTION ORAL at 20:54

## 2017-05-23 ASSESSMENT — LIFESTYLE VARIABLES
TOBACCO_USE: 1
TOBACCO_USE: 1

## 2017-05-23 NOTE — OR NURSING
"Pt meeting phase one completion criteria @ 1400.  Cont alert and oriented times 4.  Rating pain \"tolerable\" at incision site.  Lungs cont equal emerson diminished bi lat; cont to encourage cough and deep breath.  Tolerating wean to 2 liters nasal cannula.  Pt on end tidal PCO2 IPI 8- 10.  Chest dressing reinforced second to sero-sang drainage - presently c/d/i.  G-J tube remains clamped.  Left chest to remain to gravity drain with minimal sero/ sang drainage.  PICC line use- infusing without difficulty.  Hand off report to 7B RN.  JANEE Parnell stated pt may transfer from PACU; also stated would place sign out note @ 1090.    "

## 2017-05-23 NOTE — PLAN OF CARE
Problem: Goal Outcome Summary  Goal: Goal Outcome Summary  OT 7B: Pt able to tolerate standing ~18 minutes for grooming/hygiene tasks with slight fatigue near end. Educated on energy conservation strategies of seated rest breaks for extended standing activities and resting before fatigue to facilitate quicker recovery and allow for participation in activities throughout duration of day. Pt mod I for doffing socks while seated and mod I for donning L sock given VCs for use of figure four technique; max A for donning R sock 2/2 pain and decreased R hip flexibility. Pt limited by pain, surgical precautions, and activity tolerance. Rec; home with assist for I/ADLs as needed.

## 2017-05-23 NOTE — PLAN OF CARE
Problem: Goal Outcome Summary  Goal: Goal Outcome Summary  Outcome: No Change  Vitals:     05/22/17 1619 05/22/17 1921 05/22/17 2226 05/23/17 0407   BP: 141/63 132/63 115/59 124/63   BP Location: Left arm Left arm Left arm Right arm   Pulse: 86 79 78 79   Resp: 18 16 16 16   Temp: 96.1  F (35.6  C) 97.5  F (36.4  C) 97.5  F (36.4  C) 97.7  F (36.5  C)   TempSrc: Oral Oral Oral Oral   SpO2: 95% 96% 95% 94%   Weight: 46.5 kg (102 lb 9.6 oz)         Height:             Afebrile. VSS. O2 sats above 92% on RA. Lung sounds diminished. Chest wound dressing c/d/i, no drainage noted. CT to gravity with 20cc serosanguinous drainage. Right chest pain controlled with PRN Dilaudid x1, scheduled Neurontin, PRN oxycodone x1. Patient had two incontinent episodes of watery stool mixed with urine, changed linens and briefs, administered Lomotil x1 and opium. Up to commode with SBA. Patient stopped TF at 0130 and MD notified, ordered hypoglycemia protocol. BG 86 and 85. Patient restarted TF at 15 mL/hr at 0500. NPO status, all meds in J-tube. Slept in between cares. Continue POC.

## 2017-05-23 NOTE — BRIEF OP NOTE
[facility], [place]    Brief Operative Note    Pre-operative diagnosis: Esophigil Perferation  Post-operative diagnosis * No post-op diagnosis entered *  Procedure: Procedure(s):  COMBINED IRRIGATION AND DEBRIDEMENT CHEST WASHOUT,COMBINED ESOPHAGOSCOPY, GASTROSCOPY, DUODENOSCOPY (EGD)  - Wound Class: IV-Dirty or Infected   - Wound Class: II-Clean Contaminated  Surgeon: Surgeon(s) and Role:     * Nalini Barreto MD - Primary     * Viki Carmen MD - Resident - Assisting  Anesthesia: General   Estimated blood loss: Minimal  Drains: None  Specimens: * No specimens in log *  Findings:   None.  Complications: None.  Implants: None.

## 2017-05-23 NOTE — OR NURSING
Pt arrived to PACU.  PACU Rn assumed care of pt @ 1250.  Pt able to state name and deny pain or nausea at present time.  Lungs diminished bi lat; tolerating face mask.  Pre-existing Chest tube remains to gravity drain - scant output amounts.   Dressing to chest and right neck c/d/i.  See flowsheet.

## 2017-05-23 NOTE — ANESTHESIA POSTPROCEDURE EVALUATION
Patient: Minerva Blanco    Procedure(s):  COMBINED IRRIGATION AND DEBRIDEMENT CHEST WASHOUT,COMBINED ESOPHAGOSCOPY, GASTROSCOPY, DUODENOSCOPY (EGD)  - Wound Class: IV-Dirty or Infected   - Wound Class: II-Clean Contaminated    Diagnosis:Esophigil Perferation  Diagnosis Additional Information: No value filed.    Anesthesia Type:  General, RSI, ETT    Note:  Anesthesia Post Evaluation    Patient location during evaluation: PACU  Patient participation: Able to fully participate in evaluation  Level of consciousness: sleepy but conscious and awake (Readily responsive to verbal stimuli.  Answers questions appropriately. )  Pain management: adequate  Airway patency: patent  Cardiovascular status: acceptable and hemodynamically stable  Respiratory status: acceptable and nasal cannula  Hydration status: acceptable  PONV: none     Anesthetic complications: None          Last vitals:  Vitals:    05/23/17 1400 05/23/17 1415 05/23/17 1446   BP: 143/69 137/69 135/59   Pulse:      Resp: 14 14 18   Temp: 36.6  C (97.8  F)  35.3  C (95.5  F)   SpO2: 99% 100% 100%         Electronically Signed By: Joseph Parnell MD  May 23, 2017  2:54 PM

## 2017-05-23 NOTE — ANESTHESIA PREPROCEDURE EVALUATION
Anesthesia Evaluation     . Pt has had prior anesthetic. Type: General and MAC    No history of anesthetic complications          ROS/MED HX    ENT/Pulmonary:     (+)tobacco use, Current use 1 packs/day  , . .    Neurologic: Comment: Left pinpoint pupil associated with MS    (+)Multiple Sclerosis limitations: heat and noise sensitivity,     Cardiovascular:     (+) ----. : . . . :. . Previous cardiac testing Echodate:1/9/2017results:Technically difficult study.  Global and regional left ventricular function is hyperdynamic with an LVEF  >70%.  The right ventricular function is hyperdynamic.  Mild pulmonary hypertension is present.  The inferior vena cava is normal. The estimated mean right atrial pressure is  <3 mmHg.  No pericardial effusion is present.  Previous study not available for comparison.date: results:ECG reviewed date:2/22/2017 results:Sinus rizwan with left atrial enlargement date: results:          METS/Exercise Tolerance:  3 - Able to walk 1-2 blocks without stopping   Hematologic:     (+) Anemia, History of Transfusion no previous transfusion reaction -     (-) history of blood clots   Musculoskeletal: Comment: Fibromyalgia - generalized body aches  (+) arthritis, , , -       GI/Hepatic: Comment: History of hiatal hernia w/ associated GERD now s/p surgery.  GERD resolved    S/p esophagectomy    (+) GERD (asymptomatic currently, stopped protonix 3 months ago) Other, Inflammatory bowel disease, Other GI/Hepatic chronic esophageal perforation      Renal/Genitourinary:  - ROS Renal section negative       Endo:  - neg endo ROS       Psychiatric:  - neg psychiatric ROS       Infectious Disease:  - neg infectious disease ROS       Malignancy:   (+) Malignancy History of Breast  Breast CA Remission status post Surgery, Chemo and Radiation.         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  No vision impairment now.   (+) No chance of pregnancy C-spine cleared:  N/A, H/O Chronic Pain,H/O chronic opiod use , no other significant disability                    Physical Exam  Normal systems: dental    Airway   Mallampati: I  TM distance: >3 FB  Neck ROM: full    Dental     Cardiovascular   Rhythm and rate: regular and normal      Pulmonary    breath sounds clear to auscultation                        Anesthesia Plan      History & Physical Review  History and physical reviewed and following examination; no interval change.    ASA Status:  3 .        Plan for General, RSI and ETT with Intravenous induction. Maintenance will be Balanced.    PONV prophylaxis:  Ondansetron (or other 5HT-3)  Additional equipment: 2nd IV      Postoperative Care  Postoperative pain management:  IV analgesics.      Consents  Anesthetic plan, risks, benefits and alternatives discussed with:  Patient..        ANESTHESIA PREOP EVALUATION    PROCEDURE: Procedure(s):  COMBINED IRRIGATION AND DEBRIDEMENT CHEST WASHOUT,COMBINED ESOPHAGOSCOPY, GASTROSCOPY, DUODENOSCOPY (EGD)  - Wound Class: I-Clean   - Wound Class: II-Clean Contaminated    HPI: Minerva Blanco is a 72 year old female with retrosternal abscess s/p pharyngostomy and spit fistula for chronic esophageal perforation who presents for the above procedure.     PAST MEDICAL HISTORY:  Past Medical History:   Diagnosis Date     Atrial fibrillation (H)      Breast cancer (H) 2007    right side - lumpectomy, chemo, and local radiation     Esophageal perforation      Fibromyalgia      GERD (gastroesophageal reflux disease)      Hiatal hernia      History of blood transfusion      IBS (irritable bowel syndrome)      Meniere's disease     deaf left ear     MS (multiple sclerosis) (H)        PAST SURGICAL HISTORY:  Past Surgical History:   Procedure Laterality Date     APPENDECTOMY       BACK SURGERY  5/1/2015    lumbar fusion     BRONCHOSCOPY FLEXIBLE N/A 4/17/2017    Procedure: BRONCHOSCOPY FLEXIBLE;;  Surgeon: Jens Wise MD;  Location:   OR     C GASTROSTOMY/JEJUN TUBE      J tube for feedings     CLOSE SPIT FISTULA N/A 4/17/2017    Procedure: CLOSE SPIT FISTULA;;  Surgeon: Jens Wise MD;  [facility]     COMBINED ESOPHAGOSCOPY, GASTROSCOPY, DUODENOSCOPY (EGD), REMOVE ESOPHAGEAL STENT N/A 8/26/2016    Procedure: COMBINED ESOPHAGOSCOPY, GASTROSCOPY, DUODENOSCOPY (EGD), REMOVE ESOPHAGEAL STENT;  Surgeon: Jens Wise MD;  Location: [facility]     CREATE SPIT FISTULA N/A 1/9/2017    Procedure: CREATE SPIT FISTULA;  Surgeon: Jens Wise MD;  [facility]: [facility]     ESOPHAGECTOMY N/A 1/9/2017    Procedure: ESOPHAGECTOMY;  Surgeon: Jens Wise MD;  [facility]: [facility]     ESOPHAGOSCOPY, GASTROSCOPY, DUODENOSCOPY (EGD), COMBINED N/A 4/18/2016    Procedure: COMBINED ESOPHAGOSCOPY, GASTROSCOPY, DUODENOSCOPY (EGD);  Surgeon: Alexis Barraza MD;  Location: [facility]     ESOPHAGOSCOPY, GASTROSCOPY, DUODENOSCOPY (EGD), COMBINED N/A 4/25/2016    Procedure: COMBINED ESOPHAGOSCOPY, GASTROSCOPY, DUODENOSCOPY (EGD);  Surgeon: Alexis Barraza MD;  [facility]     ESOPHAGOSCOPY, GASTROSCOPY, DUODENOSCOPY (EGD), COMBINED N/A 5/4/2016    Procedure: COMBINED ESOPHAGOSCOPY, GASTROSCOPY, DUODENOSCOPY (EGD);  Surgeon: Alexis Barraza MD;  [facility]     ESOPHAGOSCOPY, GASTROSCOPY, DUODENOSCOPY (EGD), COMBINED N/A 5/18/2016    Procedure: COMBINED ESOPHAGOSCOPY, GASTROSCOPY, DUODENOSCOPY (EGD);  Surgeon: Alexis Barraza MD;  [facility]     ESOPHAGOSCOPY, GASTROSCOPY, DUODENOSCOPY (EGD), COMBINED N/A 6/22/2016    Procedure: COMBINED ESOPHAGOSCOPY, GASTROSCOPY, DUODENOSCOPY (EGD);  Surgeon: Alexis Barraza MD;  [facility]     ESOPHAGOSCOPY, GASTROSCOPY, DUODENOSCOPY (EGD), COMBINED N/A 7/12/2016    Procedure: COMBINED ESOPHAGOSCOPY, GASTROSCOPY, DUODENOSCOPY (EGD);  Surgeon: Jens Wise MD;  [facility]     ESOPHAGOSCOPY, GASTROSCOPY,  DUODENOSCOPY (EGD), COMBINED N/A 4/21/2017    Procedure: COMBINED ESOPHAGOSCOPY, GASTROSCOPY, DUODENOSCOPY (EGD);  Esophagogastroduodenoscopy, Pharyngostomy Tube Placement ;  Surgeon: Jens Wise MD;  [facility]     ESOPHAGOSCOPY, GASTROSCOPY, DUODENOSCOPY (EGD), COMBINED N/A 5/19/2017    Procedure: COMBINED ESOPHAGOSCOPY, GASTROSCOPY, DUODENOSCOPY (EGD);  Upper Endoscopy, Irrigation and Debridement of Chest Wound ;  Surgeon: Nalini Barreto MD;  Location: [facility]     ESOPHAGOSCOPY, GASTROSCOPY, DUODENOSCOPY (EGD), DILATATION, COMBINED N/A 6/29/2016    Procedure: COMBINED ESOPHAGOSCOPY, GASTROSCOPY, DUODENOSCOPY (EGD), DILATATION;  Surgeon: Jens Wise MD;  [facility]: [facility]     EXPLORE NECK N/A 5/19/2017    Procedure: EXPLORE NECK;;  Surgeon: Nalini Barreto MD;  [facility]     HC UGI ENDOSCOPY W TRANSENDOSCOPIC STENT PLACEMENT N/A 7/12/2016    Procedure: COMBINED ESOPHAGOSCOPY, GASTROSCOPY, DUODENOSCOPY (EGD), PLACE TRANSENDOSCOPIC ESOPHAGEAL STENT;  Surgeon: Jens Wise MD;  [facility]     HC UGI ENDOSCOPY W TRANSENDOSCOPIC STENT PLACEMENT N/A 7/22/2016    Procedure: COMBINED ESOPHAGOSCOPY, GASTROSCOPY, DUODENOSCOPY (EGD), PLACE TRANSENDOSCOPIC ESOPHAGEAL STENT;  Surgeon: Jens Wise MD;  [facility]     IRRIGATION AND DEBRIDEMENT CHEST WASHOUT, COMBINED N/A 6/29/2016    Procedure: COMBINED IRRIGATION AND DEBRIDEMENT CHEST WASHOUT;  Surgeon: Jens Wise MD;  [facility]     LAPAROSCOPIC ASSISTED INSERTION TUBE JEJUNOSTOMY N/A 4/17/2017    Procedure: LAPAROSCOPIC ASSISTED INSERTION TUBE JEJUNOSTOMY;;  Surgeon: Jens Wise MD;  [facility]     LAPAROSCOPY DIAGNOSTIC (GENERAL) N/A 1/9/2017    Procedure: LAPAROSCOPY DIAGNOSTIC (GENERAL);  Surgeon: Jens Wise MD;  [facility]     LAPAROSCOPY DIAGNOSTIC (GENERAL) N/A 4/17/2017    Procedure: LAPAROSCOPY DIAGNOSTIC (GENERAL);  Laparoscopic , Neck  "Dissection, Spit Fistula Takedown, Laparoscopic Jejunostomy Tube and Pharyngostomy Tube, Gastric Pull up, Upper Endoscopy(EGD)  , Flexible Bronchoscopy ,Sternotomy ;  Surgeon: Jens Wise MD;  [facility]     LUMPECTOMY BREAST Right 2007     NERVE BLOCK PERIPHERAL N/A 8/30/2016    Procedure: NERVE BLOCK PERIPHERAL;  Surgeon: GENERIC ANESTHESIA PROVIDER;  Location: [facility]     NISSEN FUNDOPLICATION  1/2016     PHARYNGOSTOMY N/A 4/18/2016    Procedure: PHARYNGOSTOMY;  Surgeon: Alexis Barraza MD;  [facility]     PHARYNGOSTOMY N/A 4/25/2016    Procedure: PHARYNGOSTOMY;  Surgeon: Alexis Barraza MD;  [facility]     PHARYNGOSTOMY N/A 5/4/2016    Procedure: PHARYNGOSTOMY;  Surgeon: Alexis Barraza MD;  [facility]     PHARYNGOSTOMY N/A 6/22/2016    Procedure: PHARYNGOSTOMY;  Surgeon: Alexis Barraza MD;  [facility]     PHARYNGOSTOMY N/A 6/29/2016    Procedure: PHARYNGOSTOMY;  Surgeon: Jens Wise MD;  [facility]     PHARYNGOSTOMY N/A 4/21/2017    Procedure: PHARYNGOSTOMY;;  Surgeon: Jens Wise MD;  [facility]     PICC INSERTION Left 8/25/2016    5fr DL BioFlo PICC, 42cm (3cm external) in the L medial brachial vein w/ tip in the SVC RA junction.     THORACOTOMY Right 1998    lung infection - \"hard crust formed on lung\"     THORACOTOMY Left 1/9/2017    Procedure: THORACOTOMY;  Surgeon: Jens Wise MD;  Location[facility]     WRIST SURGERY         SOCIAL HISTORY:   Social History   Substance Use Topics     Smoking status: Former Smoker     Packs/day: 1.00     Years: 15.00     Types: Cigarettes     Quit date: 1/1/1998     Smokeless tobacco: Not on file     Alcohol use No       ALLERGIES:   Allergies   Allergen Reactions     Amoxicillin Diarrhea     Ativan [Lorazepam] Other (See Comments)     Hallucinations     Hydromorphone Itching     4/12/17 - patient open to using this as she tolerated " Hydromorphone PCA during hospitalization in January 2017.      Morphine Itching       MEDICATIONS:  Prescriptions Prior to Admission   Medication Sig Dispense Refill Last Dose     oxyCODONE (ROXICODONE) 10 MG IR tablet Take 1 to 1.5 tablet every 3-4 hours PRN pain 60 tablet 0 5/18/2017 at 1200     diphenoxylate-atropine (LOMOTIL) 0.5-0.005 mg/mL liquid Take 5 mLs by mouth 4 times daily as needed for diarrhea 300 mL 0 5/18/2017 at 0800     fiber modular (NUTRISOURCE FIBER) packet 1 packet by Per Feeding Tube route 3 times daily (with meals) 60 packet 0 Unknown at Unknown time     metoprolol (LOPRESSOR) 10 mg/mL SUSP 2.5 mLs (25 mg) by Per J Tube route 2 times daily   5/18/2017 at 0800     opium tincture 10 mg/mL (1%) liquid Take 0.6 mLs (6 mg) by mouth every 6 hours 118 mL 0 5/18/2017 at 0800     ferrous sulfate 300 (60 FE) MG/5ML syrup 5 mLs (300 mg) by Per J Tube route daily 150 mL 0 5/17/2017 at 2000     gabapentin (NEURONTIN) 250 MG/5ML solution Take 6 mLs (300 mg) by mouth every 8 hours 450 mL 0 5/18/2017 at 0800     traZODone (DESYREL) 100 MG tablet 0.5 tablets (50 mg) by Per G Tube route At Bedtime (Patient taking differently: 100 mg by Per G Tube route At Bedtime ) 30 tablet 0 5/18/2017 at 2200     simethicone (MYLICON) 40 MG/0.6ML suspension 0.6 mLs (40 mg) by Per Feeding Tube route every 6 hours as needed for cramping 30 mL 0 5/18/2017 at 0800     ranitidine (ZANTAC) 150 MG/10ML syrup Take 10 mLs (150 mg) by mouth 2 times daily 600 mL 0 5/18/2017 at 0800     warfarin (COUMADIN) 2.5 MG tablet Take 1 tablet (2.5 mg) by mouth daily (Patient not taking: Reported on 5/18/2017) 30 tablet 1 Unknown at Unknown time     order for DME Equipment being ordered:   [facility] Suction Machine-Intermittent  Suction Canisters(2)  Suction Canister Holders(2)  Suction Connect Tube(2)  5 in 1 Connector(2)  Bacteria Filter(2)  [employer] Suction(2)    Treatment Diagnosis: Esophogeal Perforation, s/p esophagectomy 1 Device 0 Taking      order for DME Equipment being ordered:   Suction Pump  Suction Canister(2)  Suction Tubing(2)  Bacteria Filter(2)  [employer](4)  Red Rubber Catheter(2)    Treatment Diagnosis: Esophogeal Perforation, s/p esophagectomy 1 Device 0 Taking     DiphenhydrAMINE HCl (BENADRYL PO) Take 25 mg by mouth nightly as needed   5/17/2017 at 2000     cholestyramine (QUESTRAN) 4 G Packet Take 1 packet by mouth daily   5/18/2017 at 0800     multivitamins with minerals (CERTAVITE/CEROVITE) LIQD liquid Take 15 mLs by mouth daily   5/17/2017 at 0800     loperamide (IMODIUM) 1 MG/5ML liquid Take 20 mLs (4 mg) by mouth 4 times daily as needed for diarrhea (Patient taking differently: Take 4 mg by mouth 2 times daily ) 200 mL  5/18/2017 at 0800     Lactobacillus Rhamnosus, GG, (CULTURELLE PO) Take 1 tablet by mouth 2 times daily    5/18/2017 at 0800       NPO STATUS: NPO after midnight.  LABS:    BMP:  Recent Labs   Lab Test  05/18/17 2015   NA  132*   POTASSIUM  3.4   CHLORIDE  97   CO2  23   BUN  8   CR  0.45*   GLC  64*   ANNABELLE  8.9       LFTs:   Recent Labs   Lab Test  04/17/17   0653   PROTTOTAL  8.6   ALBUMIN  3.6   BILITOTAL  0.8   ALKPHOS  283*   AST  38   ALT  56*       CBC:   Recent Labs   Lab Test  05/18/17 2015   WBC  21.3*   RBC  3.45*   HGB  9.4*   HCT  29.3*   MCV  85   MCH  27.2   MCHC  32.1   RDW  16.0*   PLT  678*       Coags:  Recent Labs   Lab Test  05/18/17 2015   INR  4.10*   PTT  68*       IMAGING AND STUDIES:     ASSESSMENT & PLAN:  - ASA 3  - GETA with standard ASA monitors, IV induction, RSI, and balanced anesthetic  - PIV x2  - Antibiotics per surgery  - PONV prophylaxis  - Blood products available, possible administration discussed with patient  - Relevant risks, benefits, alternatives and the anesthetic plan was discussed.  All questions were answered and there was agreement to proceed.      France Gee CA1  pager [phone]      I have seen and evaluated the patient myself and agree with the above  assessment and plan.  I have explained the risks/benefits of anesthesia to the patient who understands and agrees to proceed.    Sapphire Rutlegde MD  Staff Anesthesiologist  Pager [phone]

## 2017-05-23 NOTE — ANESTHESIA PREPROCEDURE EVALUATION
Anesthesia Evaluation     . Pt has had prior anesthetic. Type: General and MAC    No history of anesthetic complications          ROS/MED HX    ENT/Pulmonary:     (+)tobacco use, Current use 1 packs/day  , . .    Neurologic: Comment: Left pinpoint pupil associated with MS    (+)Multiple Sclerosis limitations: heat and noise sensitivity,     Cardiovascular:     (+) ----. : . . . :. . Previous cardiac testing Echodate:1/9/2017results:Technically difficult study.  Global and regional left ventricular function is hyperdynamic with an LVEF  >70%.  The right ventricular function is hyperdynamic.  Mild pulmonary hypertension is present.  The inferior vena cava is normal. The estimated mean right atrial pressure is  <3 mmHg.  No pericardial effusion is present.  Previous study not available for comparison.date: results:ECG reviewed date:2/22/2017 results:Sinus rizwan with left atrial enlargement date: results:          METS/Exercise Tolerance:  3 - Able to walk 1-2 blocks without stopping   Hematologic:     (+) Anemia, History of Transfusion no previous transfusion reaction -     (-) history of blood clots   Musculoskeletal: Comment: Fibromyalgia - generalized body aches  (+) arthritis, , , -       GI/Hepatic: Comment: History of hiatal hernia w/ associated GERD now s/p surgery.  GERD resolved    S/p esophagectomy    (+) GERD (asymptomatic currently, stopped protonix 3 months ago) Other, hiatal hernia, Inflammatory bowel disease, Other GI/Hepatic chronic esophageal perforation      Renal/Genitourinary:  - ROS Renal section negative       Endo:  - neg endo ROS       Psychiatric:  - neg psychiatric ROS       Infectious Disease:  - neg infectious disease ROS       Malignancy:   (+) Malignancy History of Breast  Breast CA Remission status post Surgery, Chemo and Radiation.         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  No vision impairment now.   (+) No chance of pregnancy  C-spine cleared: N/A, H/O Chronic Pain,H/O chronic opiod use , no other significant disability                    Physical Exam  Normal systems: dental    Airway   Mallampati: I  TM distance: >3 FB  Neck ROM: full    Dental     Cardiovascular   Rhythm and rate: regular and normal      Pulmonary    breath sounds clear to auscultation                        Anesthesia Plan      History & Physical Review  History and physical reviewed and following examination; no interval change.    ASA Status:  3 .        Plan for MAC with Intravenous induction. Maintenance will be Balanced.  Reason for MAC:  Deep or markedly invasive procedure (G8)  PONV prophylaxis:  Ondansetron (or other 5HT-3)  Additional equipment: 2nd IV Surgeon requesting a MAC, she states that procedure will be less than 15 min.  Patient will need multiple follow-up procedures in the OR in the next several day, and she agrees with plan for MAC.      Postoperative Care  Postoperative pain management:  IV analgesics.      Consents  Anesthetic plan, risks, benefits and alternatives discussed with:  Patient..        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     PAST MEDICAL HISTORY:  Past Medical History:   Diagnosis Date     Atrial fibrillation (H)      Breast cancer (H) 2007    right side - lumpectomy, chemo, and local radiation     Esophageal perforation      Fibromyalgia      GERD (gastroesophageal reflux disease)      Hiatal hernia      History of blood transfusion      IBS (irritable bowel syndrome)      Meniere's disease     deaf left ear     MS (multiple sclerosis) (H)        PAST SURGICAL HISTORY:  Past Surgical History:   Procedure Laterality Date     APPENDECTOMY       BACK SURGERY  5/1/2015     lumbar fusion     BRONCHOSCOPY FLEXIBLE N/A 4/17/2017    Procedure: BRONCHOSCOPY FLEXIBLE;;  Surgeon: Jens Wise MD;  Location: [facility]     C GASTROSTOMY/JEJUN TUBE      J tube for feedings     CLOSE SPIT FISTULA N/A 4/17/2017    Procedure: CLOSE SPIT FISTULA;;  Surgeon: Jens Wise MD;  Location: [facility]     COMBINED ESOPHAGOSCOPY, GASTROSCOPY, DUODENOSCOPY (EGD), REMOVE ESOPHAGEAL STENT N/A 8/26/2016    Procedure: COMBINED ESOPHAGOSCOPY, GASTROSCOPY, DUODENOSCOPY (EGD), REMOVE ESOPHAGEAL STENT;  Surgeon: Jens Wise MD;  Location: [facility]     CREATE SPIT FISTULA N/A 1/9/2017    Procedure: CREATE SPIT FISTULA;  Surgeon: Jens Wise MD;  Location: [facility]     ESOPHAGECTOMY N/A 1/9/2017    Procedure: ESOPHAGECTOMY;  Surgeon: Jens Wise MD;  [facility]: [facility]     ESOPHAGOSCOPY, GASTROSCOPY, DUODENOSCOPY (EGD), COMBINED N/A 4/18/2016    Procedure: COMBINED ESOPHAGOSCOPY, GASTROSCOPY, DUODENOSCOPY (EGD);  Surgeon: Alexis Barraza MD;  [facility]: [facility]     ESOPHAGOSCOPY, GASTROSCOPY, DUODENOSCOPY (EGD), COMBINED N/A 4/25/2016    Procedure: COMBINED ESOPHAGOSCOPY, GASTROSCOPY, DUODENOSCOPY (EGD);  Surgeon: Alexis Barraza MD;  [facility]     ESOPHAGOSCOPY, GASTROSCOPY, DUODENOSCOPY (EGD), COMBINED N/A 5/4/2016    Procedure: COMBINED ESOPHAGOSCOPY, GASTROSCOPY, DUODENOSCOPY (EGD);  Surgeon: Alexis Barraza MD;  [facility]     ESOPHAGOSCOPY, GASTROSCOPY, DUODENOSCOPY (EGD), COMBINED N/A 5/18/2016    Procedure: COMBINED ESOPHAGOSCOPY, GASTROSCOPY, DUODENOSCOPY (EGD);  Surgeon: Alexis Barraza MD;  [facility]     ESOPHAGOSCOPY, GASTROSCOPY, DUODENOSCOPY (EGD), COMBINED N/A 6/22/2016    Procedure: COMBINED ESOPHAGOSCOPY, GASTROSCOPY, DUODENOSCOPY (EGD);  Surgeon: Alexis Barraza MD;  [facility]     ESOPHAGOSCOPY, GASTROSCOPY, DUODENOSCOPY (EGD), COMBINED N/A 7/12/2016    Procedure: COMBINED  ESOPHAGOSCOPY, GASTROSCOPY, DUODENOSCOPY (EGD);  Surgeon: Jens Wise MD;  [facility]     ESOPHAGOSCOPY, GASTROSCOPY, DUODENOSCOPY (EGD), COMBINED N/A 4/21/2017    Procedure: COMBINED ESOPHAGOSCOPY, GASTROSCOPY, DUODENOSCOPY (EGD);  Esophagogastroduodenoscopy, Pharyngostomy Tube Placement ;  Surgeon: Jens Wise MD;  [facility]     ESOPHAGOSCOPY, GASTROSCOPY, DUODENOSCOPY (EGD), COMBINED N/A 5/19/2017    Procedure: COMBINED ESOPHAGOSCOPY, GASTROSCOPY, DUODENOSCOPY (EGD);  Upper Endoscopy, Irrigation and Debridement of Chest Wound ;  Surgeon: Nalini Barreto MD;  [facility]     ESOPHAGOSCOPY, GASTROSCOPY, DUODENOSCOPY (EGD), DILATATION, COMBINED N/A 6/29/2016    Procedure: COMBINED ESOPHAGOSCOPY, GASTROSCOPY, DUODENOSCOPY (EGD), DILATATION;  Surgeon: Jens Wise MD;  [facility]     EXPLORE NECK N/A 5/19/2017    Procedure: EXPLORE NECK;;  Surgeon: Nalini Barreto MD;  [facility]     HC UGI ENDOSCOPY W TRANSENDOSCOPIC STENT PLACEMENT N/A 7/12/2016    Procedure: COMBINED ESOPHAGOSCOPY, GASTROSCOPY, DUODENOSCOPY (EGD), PLACE TRANSENDOSCOPIC ESOPHAGEAL STENT;  Surgeon: Jens Wise MD;  [facility]     HC UGI ENDOSCOPY W TRANSENDOSCOPIC STENT PLACEMENT N/A 7/22/2016    Procedure: COMBINED ESOPHAGOSCOPY, GASTROSCOPY, DUODENOSCOPY (EGD), PLACE TRANSENDOSCOPIC ESOPHAGEAL STENT;  Surgeon: Jens Wise MD;  [facility]     IRRIGATION AND DEBRIDEMENT CHEST WASHOUT, COMBINED N/A 6/29/2016    Procedure: COMBINED IRRIGATION AND DEBRIDEMENT CHEST WASHOUT;  Surgeon: Jens Wise MD;  [facility]     LAPAROSCOPIC ASSISTED INSERTION TUBE JEJUNOSTOMY N/A 4/17/2017    Procedure: LAPAROSCOPIC ASSISTED INSERTION TUBE JEJUNOSTOMY;;  Surgeon: Jens Wise MD;  [facility]     LAPAROSCOPY DIAGNOSTIC (GENERAL) N/A 1/9/2017    Procedure: LAPAROSCOPY DIAGNOSTIC (GENERAL);  Surgeon: Jens Wise MD;   "[facility]: [facility]     LAPAROSCOPY DIAGNOSTIC (GENERAL) N/A 4/17/2017    Procedure: LAPAROSCOPY DIAGNOSTIC (GENERAL);  Laparoscopic , Neck Dissection, Spit Fistula Takedown, Laparoscopic Jejunostomy Tube and Pharyngostomy Tube, Gastric Pull up, Upper Endoscopy(EGD)  , Flexible Bronchoscopy ,Sternotomy ;  Surgeon: Jens Wise MD;  [facility]     LUMPECTOMY BREAST Right 2007     NERVE BLOCK PERIPHERAL N/A 8/30/2016    Procedure: NERVE BLOCK PERIPHERAL;  Surgeon: GENERIC ANESTHESIA PROVIDER;  Location: [facility]     NISSEN FUNDOPLICATION  1/2016     PHARYNGOSTOMY N/A 4/18/2016    Procedure: PHARYNGOSTOMY;  Surgeon: Alexis Barraza MD;  [facility]     PHARYNGOSTOMY N/A 4/25/2016    Procedure: PHARYNGOSTOMY;  Surgeon: Alexis Barraza MD;  [facility]     PHARYNGOSTOMY N/A 5/4/2016    Procedure: PHARYNGOSTOMY;  Surgeon: Alexis Barraza MD;  [facility]     PHARYNGOSTOMY N/A 6/22/2016    Procedure: PHARYNGOSTOMY;  Surgeon: Alexis Barraza MD;  [facility]     PHARYNGOSTOMY N/A 6/29/2016    Procedure: PHARYNGOSTOMY;  Surgeon: Jens Wise MD;  [facility]     PHARYNGOSTOMY N/A 4/21/2017    Procedure: PHARYNGOSTOMY;;  Surgeon: Jens Wise MD;  [facility]     PICC INSERTION Left 8/25/2016    5fr DL BioFlo PICC, 42cm (3cm external) in the L medial brachial vein w/ tip in the SVC RA junction.     THORACOTOMY Right 1998    lung infection - \"hard crust formed on lung\"     THORACOTOMY Left 1/9/2017    Procedure: THORACOTOMY;  Surgeon: Jens Wise MD;  Location: [facility]     WRIST SURGERY         SOCIAL HISTORY:   Social History   Substance Use Topics     Smoking status: Former Smoker     Packs/day: 1.00     Years: 15.00     Types: Cigarettes     Quit date: 1/1/1998     Smokeless tobacco: Not on file     Alcohol use No       ALLERGIES:   Allergies   Allergen Reactions     Amoxicillin Diarrhea     Ativan " [Lorazepam] Other (See Comments)     Hallucinations     Hydromorphone Itching     4/12/17 - patient open to using this as she tolerated Hydromorphone PCA during hospitalization in January 2017.      Morphine Itching       MEDICATIONS:  Prescriptions Prior to Admission   Medication Sig Dispense Refill Last Dose     oxyCODONE (ROXICODONE) 10 MG IR tablet Take 1 to 1.5 tablet every 3-4 hours PRN pain 60 tablet 0 5/18/2017 at 1200     diphenoxylate-atropine (LOMOTIL) 0.5-0.005 mg/mL liquid Take 5 mLs by mouth 4 times daily as needed for diarrhea 300 mL 0 5/18/2017 at 0800     fiber modular (NUTRISOURCE FIBER) packet 1 packet by Per Feeding Tube route 3 times daily (with meals) 60 packet 0 Unknown at Unknown time     metoprolol (LOPRESSOR) 10 mg/mL SUSP 2.5 mLs (25 mg) by Per J Tube route 2 times daily   5/18/2017 at 0800     opium tincture 10 mg/mL (1%) liquid Take 0.6 mLs (6 mg) by mouth every 6 hours 118 mL 0 5/18/2017 at 0800     ferrous sulfate 300 (60 FE) MG/5ML syrup 5 mLs (300 mg) by Per J Tube route daily 150 mL 0 5/17/2017 at 2000     gabapentin (NEURONTIN) 250 MG/5ML solution Take 6 mLs (300 mg) by mouth every 8 hours 450 mL 0 5/18/2017 at 0800     traZODone (DESYREL) 100 MG tablet 0.5 tablets (50 mg) by Per G Tube route At Bedtime (Patient taking differently: 100 mg by Per G Tube route At Bedtime ) 30 tablet 0 5/18/2017 at 2200     simethicone (MYLICON) 40 MG/0.6ML suspension 0.6 mLs (40 mg) by Per Feeding Tube route every 6 hours as needed for cramping 30 mL 0 5/18/2017 at 0800     ranitidine (ZANTAC) 150 MG/10ML syrup Take 10 mLs (150 mg) by mouth 2 times daily 600 mL 0 5/18/2017 at 0800     warfarin (COUMADIN) 2.5 MG tablet Take 1 tablet (2.5 mg) by mouth daily (Patient not taking: Reported on 5/18/2017) 30 tablet 1 Unknown at Unknown time     order for DME Equipment being ordered:   [employer]omco Suction Machine-Intermittent  Suction Canisters(2)  Suction Canister Holders(2)  Suction Connect Tube(2)  5 in 1  Connector(2)  Bacteria Filter(2)  Yaunkauer Suction(2)    Treatment Diagnosis: Esophogeal Perforation, s/p esophagectomy 1 Device 0 Taking     order for DME Equipment being ordered:   Suction Pump  Suction Canister(2)  Suction Tubing(2)  Bacteria Filter(2)  Yaunkauer(4)  Red Rubber Catheter(2)    Treatment Diagnosis: Esophogeal Perforation, s/p esophagectomy 1 Device 0 Taking     DiphenhydrAMINE HCl (BENADRYL PO) Take 25 mg by mouth nightly as needed   5/17/2017 at 2000     cholestyramine (QUESTRAN) 4 G Packet Take 1 packet by mouth daily   5/18/2017 at 0800     multivitamins with minerals (CERTAVITE/CEROVITE) LIQD liquid Take 15 mLs by mouth daily   5/17/2017 at 0800     loperamide (IMODIUM) 1 MG/5ML liquid Take 20 mLs (4 mg) by mouth 4 times daily as needed for diarrhea (Patient taking differently: Take 4 mg by mouth 2 times daily ) 200 mL  5/18/2017 at 0800     Lactobacillus Rhamnosus, GG, (CULTURELLE PO) Take 1 tablet by mouth 2 times daily    5/18/2017 at 0800       NPO STATUS: NPO after midnight.  LABS:    BMP:  Recent Labs   Lab Test  05/22/17   0810   NA  141   POTASSIUM  4.3   CHLORIDE  108   CO2  28   BUN  8   CR  0.42*   GLC  71   ANNABELLE  7.8*       LFTs:   Recent Labs   Lab Test  04/17/17   0653   PROTTOTAL  8.6   ALBUMIN  3.6   BILITOTAL  0.8   ALKPHOS  283*   AST  38   ALT  56*       CBC:   Recent Labs   Lab Test  05/23/17   0825   WBC  12.9*   RBC  2.95*   HGB  8.1*   HCT  26.3*   MCV  89   MCH  27.5   MCHC  30.8*   RDW  16.2*   PLT  638*       Coags:  Recent Labs   Lab Test  05/20/17   0334  05/19/17   1650   INR  1.33*  1.87*   PTT   --   54*   FIBR   --   497*       Anesthesia attending addendum    Prior to anesthetic I have interviewed and examined the patient, reviewed the past medical history, lab results and other pertinent findings, and discussed the ssessment and plan with the anesthesia and surgery teams.  72 year old female who  has a past medical history of Atrial fibrillation (H); Breast  cancer (H) (2007); Esophageal perforation; Fibromyalgia; GERD (gastroesophageal reflux disease); Hiatal hernia; History of blood transfusion; IBS (irritable bowel syndrome); Meniere's disease; and MS (multiple sclerosis) (H). to OR today for Procedure(s):  Irrigation and Debridement Chest Washout  - Wound Class: I-Clean  NPO status adequate.    Hemoglobin   Date Value Ref Range Status   05/24/2017 7.0 (L) 11.7 - 15.7 g/dL Final     Potassium   Date Value Ref Range Status   05/22/2017 4.3 3.4 - 5.3 mmol/L Final       Plan for MAC/per surgeon request, standard monitors, large bore IVs.  GA backup. PONV prophylaxis.  Antibiotics per surgery.  MS has not worsened after GA in the past.   Anesthetic risks discussed with the patient, she agrees to have the procedure under MAC.    Anaya Enriquez MD  Anesthesiology Attending  Pager # [phone]  May 24, 2017

## 2017-05-23 NOTE — PLAN OF CARE
Problem: Goal Outcome Summary  Goal: Goal Outcome Summary  Outcome: No Change  To OR today for wound debridement and EGD. IJ removed in pre-op area.  Pt returned to 7B at 1445. VSS. Pt arouses easily but is sleepy.  at bedside. New ojeda bag attached to chest tube without any drainage at this time. Chest dressings has small faint pink shadow. Pt appears to be resting comfortable.   Prior to leaving for OR pt was medicated with Dilaudid 0.5 x1 and Oxycodone 15mg x1 for sternal pain. Lungs clear and tolerating getting up to commode independently. Voiding adequately with occasional diarrhea.

## 2017-05-23 NOTE — ANESTHESIA CARE TRANSFER NOTE
Patient: Minerva Blanco    Procedure(s):  COMBINED IRRIGATION AND DEBRIDEMENT CHEST WASHOUT,COMBINED ESOPHAGOSCOPY, GASTROSCOPY, DUODENOSCOPY (EGD)  - Wound Class: IV-Dirty or Infected   - Wound Class: II-Clean Contaminated    Diagnosis: Esophigil Perferation  Diagnosis Additional Information: No value filed.    Anesthesia Type:   General, RSI, ETT     Note:    Patient transferred to:PACU  Comments: Anesthesia Care Transfer Note    Patient: Minerva Blanco    Transferred to: PACU with supplemental O2    Patient vital signs: stable    Airway: none    Monitors on, VSS, breathing spontaneously, report to RN      Ailyn Crum CRNA   5/23/2017 12:49 PM      Vitals: (Last set prior to Anesthesia Care Transfer)    CRNA VITALS  5/23/2017 1215 - 5/23/2017 1248      5/23/2017             Pulse: 74    Ht Rate: 74    SpO2: 99 %    Resp Rate (observed): 20                Electronically Signed By: CLIVE Giles CRNA  May 23, 2017  12:48 PM

## 2017-05-24 ENCOUNTER — ANESTHESIA (OUTPATIENT)
Dept: SURGERY | Facility: CLINIC | Age: 72
DRG: 856 | End: 2017-05-24
Payer: MEDICARE

## 2017-05-24 LAB
ANION GAP SERPL CALCULATED.3IONS-SCNC: 9 MMOL/L (ref 3–14)
BASOPHILS # BLD AUTO: 0 10E9/L (ref 0–0.2)
BASOPHILS NFR BLD AUTO: 0.1 %
BUN SERPL-MCNC: 3 MG/DL (ref 7–30)
CALCIUM SERPL-MCNC: 7.4 MG/DL (ref 8.5–10.1)
CHLORIDE SERPL-SCNC: 106 MMOL/L (ref 94–109)
CO2 SERPL-SCNC: 24 MMOL/L (ref 20–32)
CREAT SERPL-MCNC: 0.46 MG/DL (ref 0.52–1.04)
DIFFERENTIAL METHOD BLD: ABNORMAL
EOSINOPHIL # BLD AUTO: 0.3 10E9/L (ref 0–0.7)
EOSINOPHIL NFR BLD AUTO: 2.7 %
ERYTHROCYTE [DISTWIDTH] IN BLOOD BY AUTOMATED COUNT: 15.9 % (ref 10–15)
GFR SERPL CREATININE-BSD FRML MDRD: ABNORMAL ML/MIN/1.7M2
GLUCOSE SERPL-MCNC: 70 MG/DL (ref 70–99)
HCT VFR BLD AUTO: 22.6 % (ref 35–47)
HGB BLD-MCNC: 7 G/DL (ref 11.7–15.7)
IMM GRANULOCYTES # BLD: 0.1 10E9/L (ref 0–0.4)
IMM GRANULOCYTES NFR BLD: 0.5 %
LYMPHOCYTES # BLD AUTO: 1.1 10E9/L (ref 0.8–5.3)
LYMPHOCYTES NFR BLD AUTO: 10.7 %
MAGNESIUM SERPL-MCNC: 1.7 MG/DL (ref 1.6–2.3)
MCH RBC QN AUTO: 27.5 PG (ref 26.5–33)
MCHC RBC AUTO-ENTMCNC: 31 G/DL (ref 31.5–36.5)
MCV RBC AUTO: 89 FL (ref 78–100)
MONOCYTES # BLD AUTO: 0.9 10E9/L (ref 0–1.3)
MONOCYTES NFR BLD AUTO: 8.2 %
NEUTROPHILS # BLD AUTO: 8.3 10E9/L (ref 1.6–8.3)
NEUTROPHILS NFR BLD AUTO: 77.8 %
NRBC # BLD AUTO: 0 10*3/UL
NRBC BLD AUTO-RTO: 0 /100
PHOSPHATE SERPL-MCNC: 3.1 MG/DL (ref 2.5–4.5)
PLATELET # BLD AUTO: 520 10E9/L (ref 150–450)
POTASSIUM SERPL-SCNC: 3.6 MMOL/L (ref 3.4–5.3)
RBC # BLD AUTO: 2.55 10E12/L (ref 3.8–5.2)
SODIUM SERPL-SCNC: 139 MMOL/L (ref 133–144)
WBC # BLD AUTO: 10.6 10E9/L (ref 4–11)

## 2017-05-24 PROCEDURE — 40000170 ZZH STATISTIC PRE-PROCEDURE ASSESSMENT II: Performed by: STUDENT IN AN ORGANIZED HEALTH CARE EDUCATION/TRAINING PROGRAM

## 2017-05-24 PROCEDURE — 83735 ASSAY OF MAGNESIUM: CPT | Performed by: THORACIC SURGERY (CARDIOTHORACIC VASCULAR SURGERY)

## 2017-05-24 PROCEDURE — 12000003 ZZH R&B CRITICAL UMMC

## 2017-05-24 PROCEDURE — 37000008 ZZH ANESTHESIA TECHNICAL FEE, 1ST 30 MIN: Performed by: STUDENT IN AN ORGANIZED HEALTH CARE EDUCATION/TRAINING PROGRAM

## 2017-05-24 PROCEDURE — 25000128 H RX IP 250 OP 636: Performed by: NURSE ANESTHETIST, CERTIFIED REGISTERED

## 2017-05-24 PROCEDURE — 27210794 ZZH OR GENERAL SUPPLY STERILE: Performed by: STUDENT IN AN ORGANIZED HEALTH CARE EDUCATION/TRAINING PROGRAM

## 2017-05-24 PROCEDURE — 25000132 ZZH RX MED GY IP 250 OP 250 PS 637: Mod: GY | Performed by: THORACIC SURGERY (CARDIOTHORACIC VASCULAR SURGERY)

## 2017-05-24 PROCEDURE — A9270 NON-COVERED ITEM OR SERVICE: HCPCS | Mod: GY | Performed by: PHYSICIAN ASSISTANT

## 2017-05-24 PROCEDURE — A9270 NON-COVERED ITEM OR SERVICE: HCPCS | Mod: GY | Performed by: SURGERY

## 2017-05-24 PROCEDURE — 36000059 ZZH SURGERY LEVEL 3 EA 15 ADDTL MIN UMMC: Performed by: STUDENT IN AN ORGANIZED HEALTH CARE EDUCATION/TRAINING PROGRAM

## 2017-05-24 PROCEDURE — 25000128 H RX IP 250 OP 636: Performed by: ANESTHESIOLOGY

## 2017-05-24 PROCEDURE — 25800025 ZZH RX 258: Performed by: ANESTHESIOLOGY

## 2017-05-24 PROCEDURE — 25000128 H RX IP 250 OP 636: Performed by: STUDENT IN AN ORGANIZED HEALTH CARE EDUCATION/TRAINING PROGRAM

## 2017-05-24 PROCEDURE — 80048 BASIC METABOLIC PNL TOTAL CA: CPT | Performed by: THORACIC SURGERY (CARDIOTHORACIC VASCULAR SURGERY)

## 2017-05-24 PROCEDURE — 40000802 ZZH SITE CHECK

## 2017-05-24 PROCEDURE — 25000125 ZZHC RX 250: Performed by: NURSE ANESTHETIST, CERTIFIED REGISTERED

## 2017-05-24 PROCEDURE — A9270 NON-COVERED ITEM OR SERVICE: HCPCS | Mod: GY | Performed by: THORACIC SURGERY (CARDIOTHORACIC VASCULAR SURGERY)

## 2017-05-24 PROCEDURE — 0JD60ZZ EXTRACTION OF CHEST SUBCUTANEOUS TISSUE AND FASCIA, OPEN APPROACH: ICD-10-PCS | Performed by: STUDENT IN AN ORGANIZED HEALTH CARE EDUCATION/TRAINING PROGRAM

## 2017-05-24 PROCEDURE — 85025 COMPLETE CBC W/AUTO DIFF WBC: CPT | Performed by: SURGERY

## 2017-05-24 PROCEDURE — 25000128 H RX IP 250 OP 636: Performed by: THORACIC SURGERY (CARDIOTHORACIC VASCULAR SURGERY)

## 2017-05-24 PROCEDURE — 27210437 ZZH NUTRITION PRODUCT SEMIELEM INTERMED LITER

## 2017-05-24 PROCEDURE — 36000057 ZZH SURGERY LEVEL 3 1ST 30 MIN - UMMC: Performed by: STUDENT IN AN ORGANIZED HEALTH CARE EDUCATION/TRAINING PROGRAM

## 2017-05-24 PROCEDURE — 25000132 ZZH RX MED GY IP 250 OP 250 PS 637: Mod: GY | Performed by: PHYSICIAN ASSISTANT

## 2017-05-24 PROCEDURE — 25000132 ZZH RX MED GY IP 250 OP 250 PS 637: Mod: GY | Performed by: SURGERY

## 2017-05-24 PROCEDURE — 37000009 ZZH ANESTHESIA TECHNICAL FEE, EACH ADDTL 15 MIN: Performed by: STUDENT IN AN ORGANIZED HEALTH CARE EDUCATION/TRAINING PROGRAM

## 2017-05-24 PROCEDURE — 36592 COLLECT BLOOD FROM PICC: CPT | Performed by: SURGERY

## 2017-05-24 PROCEDURE — 84100 ASSAY OF PHOSPHORUS: CPT | Performed by: THORACIC SURGERY (CARDIOTHORACIC VASCULAR SURGERY)

## 2017-05-24 RX ORDER — SODIUM CHLORIDE, SODIUM LACTATE, POTASSIUM CHLORIDE, CALCIUM CHLORIDE 600; 310; 30; 20 MG/100ML; MG/100ML; MG/100ML; MG/100ML
INJECTION, SOLUTION INTRAVENOUS CONTINUOUS
Status: DISCONTINUED | OUTPATIENT
Start: 2017-05-24 | End: 2017-05-24 | Stop reason: CLARIF

## 2017-05-24 RX ORDER — DEXTROSE MONOHYDRATE 25 G/50ML
25 INJECTION, SOLUTION INTRAVENOUS ONCE
Status: COMPLETED | OUTPATIENT
Start: 2017-05-24 | End: 2017-05-24

## 2017-05-24 RX ORDER — OXYCODONE HCL 5 MG/5 ML
10-15 SOLUTION, ORAL ORAL
Status: DISCONTINUED | OUTPATIENT
Start: 2017-05-24 | End: 2017-05-25

## 2017-05-24 RX ORDER — ONDANSETRON 2 MG/ML
4 INJECTION INTRAMUSCULAR; INTRAVENOUS EVERY 30 MIN PRN
Status: DISCONTINUED | OUTPATIENT
Start: 2017-05-24 | End: 2017-05-24 | Stop reason: CLARIF

## 2017-05-24 RX ORDER — DEXMEDETOMIDINE HYDROCHLORIDE 4 UG/ML
0.2-1.2 INJECTION, SOLUTION INTRAVENOUS CONTINUOUS
Status: DISCONTINUED | OUTPATIENT
Start: 2017-05-24 | End: 2017-05-24 | Stop reason: HOSPADM

## 2017-05-24 RX ORDER — PROPOFOL 10 MG/ML
INJECTION, EMULSION INTRAVENOUS PRN
Status: DISCONTINUED | OUTPATIENT
Start: 2017-05-24 | End: 2017-05-24

## 2017-05-24 RX ORDER — HYDROMORPHONE HYDROCHLORIDE 1 MG/ML
.3-.5 INJECTION, SOLUTION INTRAMUSCULAR; INTRAVENOUS; SUBCUTANEOUS EVERY 5 MIN PRN
Status: DISCONTINUED | OUTPATIENT
Start: 2017-05-24 | End: 2017-05-24 | Stop reason: CLARIF

## 2017-05-24 RX ORDER — DIPHENOXYLATE HCL/ATROPINE 2.5-.025/5
5 LIQUID (ML) ORAL 4 TIMES DAILY
Status: DISCONTINUED | OUTPATIENT
Start: 2017-05-24 | End: 2017-05-25

## 2017-05-24 RX ORDER — LIDOCAINE 50 MG/G
3 PATCH TOPICAL
Status: DISCONTINUED | OUTPATIENT
Start: 2017-05-24 | End: 2017-06-04

## 2017-05-24 RX ORDER — LIDOCAINE 40 MG/G
CREAM TOPICAL
Status: DISCONTINUED | OUTPATIENT
Start: 2017-05-24 | End: 2017-05-24 | Stop reason: HOSPADM

## 2017-05-24 RX ORDER — GUAR GUM
1 PACKET (EA) ORAL 3 TIMES DAILY
Status: DISCONTINUED | OUTPATIENT
Start: 2017-05-24 | End: 2017-05-26

## 2017-05-24 RX ORDER — ONDANSETRON 4 MG/1
4 TABLET, ORALLY DISINTEGRATING ORAL EVERY 30 MIN PRN
Status: DISCONTINUED | OUTPATIENT
Start: 2017-05-24 | End: 2017-05-24 | Stop reason: CLARIF

## 2017-05-24 RX ORDER — CEFAZOLIN SODIUM 1 G/3ML
1 INJECTION, POWDER, FOR SOLUTION INTRAMUSCULAR; INTRAVENOUS SEE ADMIN INSTRUCTIONS
Status: DISCONTINUED | OUTPATIENT
Start: 2017-05-24 | End: 2017-05-24 | Stop reason: HOSPADM

## 2017-05-24 RX ORDER — TRAZODONE HYDROCHLORIDE 50 MG/1
50-100 TABLET, FILM COATED ORAL AT BEDTIME
Status: DISCONTINUED | OUTPATIENT
Start: 2017-05-24 | End: 2017-05-25

## 2017-05-24 RX ORDER — HYDROMORPHONE HYDROCHLORIDE 1 MG/ML
0.5 INJECTION, SOLUTION INTRAMUSCULAR; INTRAVENOUS; SUBCUTANEOUS
Status: COMPLETED | OUTPATIENT
Start: 2017-05-24 | End: 2017-05-24

## 2017-05-24 RX ORDER — CEFAZOLIN SODIUM 2 G/100ML
2 INJECTION, SOLUTION INTRAVENOUS
Status: COMPLETED | OUTPATIENT
Start: 2017-05-24 | End: 2017-05-24

## 2017-05-24 RX ORDER — MULTIPLE VITAMINS W/ MINERALS TAB 9MG-400MCG
1 TAB ORAL DAILY
Status: DISCONTINUED | OUTPATIENT
Start: 2017-05-24 | End: 2017-05-25

## 2017-05-24 RX ORDER — SODIUM CHLORIDE, SODIUM LACTATE, POTASSIUM CHLORIDE, CALCIUM CHLORIDE 600; 310; 30; 20 MG/100ML; MG/100ML; MG/100ML; MG/100ML
INJECTION, SOLUTION INTRAVENOUS CONTINUOUS
Status: DISCONTINUED | OUTPATIENT
Start: 2017-05-24 | End: 2017-05-24 | Stop reason: HOSPADM

## 2017-05-24 RX ORDER — FENTANYL CITRATE 50 UG/ML
25-50 INJECTION, SOLUTION INTRAMUSCULAR; INTRAVENOUS EVERY 5 MIN PRN
Status: DISCONTINUED | OUTPATIENT
Start: 2017-05-24 | End: 2017-05-24 | Stop reason: CLARIF

## 2017-05-24 RX ORDER — LOPERAMIDE HYDROCHLORIDE 1 MG/5ML
4 SOLUTION ORAL 4 TIMES DAILY
Status: DISCONTINUED | OUTPATIENT
Start: 2017-05-24 | End: 2017-05-25

## 2017-05-24 RX ADMIN — Medication 6 MG: at 20:47

## 2017-05-24 RX ADMIN — PIPERACILLIN SODIUM,TAZOBACTAM SODIUM 3.38 G: 3; .375 INJECTION, POWDER, FOR SOLUTION INTRAVENOUS at 19:12

## 2017-05-24 RX ADMIN — HEPARIN SODIUM 5000 UNITS: 5000 INJECTION, SOLUTION INTRAVENOUS; SUBCUTANEOUS at 21:01

## 2017-05-24 RX ADMIN — OXYCODONE HYDROCHLORIDE 15 MG: 5 SOLUTION ORAL at 01:38

## 2017-05-24 RX ADMIN — PROPOFOL 25 MG: 10 INJECTION, EMULSION INTRAVENOUS at 17:50

## 2017-05-24 RX ADMIN — OXYCODONE HYDROCHLORIDE 15 MG: 5 SOLUTION ORAL at 13:43

## 2017-05-24 RX ADMIN — ACETAMINOPHEN 975 MG: 325 SOLUTION ORAL at 20:46

## 2017-05-24 RX ADMIN — MULTIVIT AND MINERALS-FERROUS GLUCONATE 9 MG IRON/15 ML ORAL LIQUID 15 ML: at 08:56

## 2017-05-24 RX ADMIN — PIPERACILLIN SODIUM,TAZOBACTAM SODIUM 3.38 G: 3; .375 INJECTION, POWDER, FOR SOLUTION INTRAVENOUS at 09:49

## 2017-05-24 RX ADMIN — LOPERAMIDE HYDROCHLORIDE 4 MG: 1 SOLUTION ORAL at 08:53

## 2017-05-24 RX ADMIN — MINERAL SUPPLEMENT IRON 300 MG / 5 ML STRENGTH LIQUID 100 PER BOX UNFLAVORED 300 MG: at 08:56

## 2017-05-24 RX ADMIN — CEFAZOLIN SODIUM 2 G: 2 INJECTION, SOLUTION INTRAVENOUS at 17:37

## 2017-05-24 RX ADMIN — OXYCODONE HYDROCHLORIDE 15 MG: 5 SOLUTION ORAL at 20:45

## 2017-05-24 RX ADMIN — Medication 5 ML: at 13:44

## 2017-05-24 RX ADMIN — LIDOCAINE 1 PATCH: 50 PATCH TOPICAL at 13:45

## 2017-05-24 RX ADMIN — Medication 6 MG: at 08:52

## 2017-05-24 RX ADMIN — METOPROLOL TARTRATE 25 MG: 100 TABLET ORAL at 08:52

## 2017-05-24 RX ADMIN — FLUCONAZOLE 200 MG: 2 INJECTION INTRAVENOUS at 20:42

## 2017-05-24 RX ADMIN — DEXTROSE MONOHYDRATE 25 ML: 25 INJECTION, SOLUTION INTRAVENOUS at 17:20

## 2017-05-24 RX ADMIN — METOPROLOL TARTRATE 25 MG: 100 TABLET ORAL at 20:48

## 2017-05-24 RX ADMIN — LOPERAMIDE HYDROCHLORIDE 4 MG: 1 SOLUTION ORAL at 20:49

## 2017-05-24 RX ADMIN — LOPERAMIDE HYDROCHLORIDE 4 MG: 1 SOLUTION ORAL at 20:47

## 2017-05-24 RX ADMIN — CHOLESTYRAMINE 4 G: 4 POWDER, FOR SUSPENSION ORAL at 08:52

## 2017-05-24 RX ADMIN — GABAPENTIN 300 MG: 250 SOLUTION ORAL at 13:44

## 2017-05-24 RX ADMIN — OXYCODONE HYDROCHLORIDE 15 MG: 5 SOLUTION ORAL at 05:40

## 2017-05-24 RX ADMIN — HYDROMORPHONE HYDROCHLORIDE 0.5 MG: 1 INJECTION, SOLUTION INTRAMUSCULAR; INTRAVENOUS; SUBCUTANEOUS at 17:08

## 2017-05-24 RX ADMIN — GABAPENTIN 300 MG: 250 SOLUTION ORAL at 20:48

## 2017-05-24 RX ADMIN — GABAPENTIN 300 MG: 250 SOLUTION ORAL at 05:05

## 2017-05-24 RX ADMIN — HEPARIN SODIUM 5000 UNITS: 5000 INJECTION, SOLUTION INTRAVENOUS; SUBCUTANEOUS at 05:05

## 2017-05-24 RX ADMIN — TRAZODONE HYDROCHLORIDE 100 MG: 50 TABLET ORAL at 21:28

## 2017-05-24 RX ADMIN — PANTOPRAZOLE SODIUM 40 MG: 40 TABLET, DELAYED RELEASE ORAL at 08:52

## 2017-05-24 RX ADMIN — PROPOFOL 30 MG: 10 INJECTION, EMULSION INTRAVENOUS at 17:34

## 2017-05-24 RX ADMIN — Medication 6 MG: at 13:44

## 2017-05-24 RX ADMIN — ACETAMINOPHEN 975 MG: 325 SOLUTION ORAL at 13:43

## 2017-05-24 RX ADMIN — DEXMEDETOMIDINE HYDROCHLORIDE 0.4 MCG/KG/HR: 100 INJECTION, SOLUTION INTRAVENOUS at 17:32

## 2017-05-24 RX ADMIN — OXYCODONE HYDROCHLORIDE 15 MG: 5 SOLUTION ORAL at 09:49

## 2017-05-24 RX ADMIN — SODIUM CHLORIDE, POTASSIUM CHLORIDE, SODIUM LACTATE AND CALCIUM CHLORIDE: 600; 310; 30; 20 INJECTION, SOLUTION INTRAVENOUS at 17:26

## 2017-05-24 RX ADMIN — MIDAZOLAM HYDROCHLORIDE 1 MG: 1 INJECTION, SOLUTION INTRAMUSCULAR; INTRAVENOUS at 17:26

## 2017-05-24 RX ADMIN — Medication 5 ML: at 20:48

## 2017-05-24 RX ADMIN — PROPOFOL 20 MG: 10 INJECTION, EMULSION INTRAVENOUS at 17:38

## 2017-05-24 RX ADMIN — PIPERACILLIN SODIUM,TAZOBACTAM SODIUM 3.38 G: 3; .375 INJECTION, POWDER, FOR SOLUTION INTRAVENOUS at 05:05

## 2017-05-24 RX ADMIN — Medication 6 MG: at 01:38

## 2017-05-24 NOTE — BRIEF OP NOTE
[facility], [place]    Brief Operative Note    Pre-operative diagnosis: Non healing wound   Post-operative diagnosis * No post-op diagnosis entered *  Procedure: Procedure(s):  Chest wound Washout and Dressing Change - Wound Class: I-Clean  Surgeon: Surgeon(s) and Role:     * Nalini Barreto MD - Primary     * Viki Carmen MD - Resident - Assisting  Anesthesia: General   Estimated blood loss: None  Drains: None  Specimens: * No specimens in log *  Findings:   None.  Complications: None.  Implants: None.

## 2017-05-24 NOTE — PROGRESS NOTES
Thoracic surgery progress note    No acute events overnight. Pain somewhat controlled. Bother by capnography. Complaining of persistent diarrhea with the tube feeds asking for them to be turned down.     Temp: 97.9  F (36.6  C) Temp  Min: 95.5  F (35.3  C)  Max: 97.9  F (36.6  C)  Resp: 20 Resp  Min: 12  Max: 24  SpO2: 93 % SpO2  Min: 93 %  Max: 100 %    No Data Recorded  Heart Rate: 75 Heart Rate  Min: 67  Max: 83  BP: 122/55 Systolic (24hrs), Av , Min:120 , Max:146   Diastolic (24hrs), Av, Min:53, Max:75    A&O, NAD  Unlabored breathing  Dressing c/d/i  Abd soft, non tender, non distended  Chest tube w/ ss drainage to gravity    I&O  UOP 1075/350  Chest tube 20/10    A/P: 72F w/ AFib on Coumadin and complex surgical history most recently s/p lap retrosternal gastric pull-through, manubrium resection, spit fistula takedown c/b anastomotic leak and substernal abscess s/p I&D and mediastinal tube placement on . Repeated EGD and washout in the OR on  showed a large esophageal perforation.     - NPO. mIVF. Will talk to nutrition about fine tuning the tube feed formula to minimize diarrhea risk. On imodium BID, fiber TID and tincture of opium QID  - Daily OR washouts, likely under MAC  - Pain management assistance with pain issues  - Will ask nursing to give pt break from capnography  - C/w Zosyn/Fluc  - Chest tube to gravity  - Protonix QDay  - Home metoprolol, trazodone, gabapentin  - Heparin SQ     Seen w/ team     Kd Liu MD  Surgery PGY1  [phone]

## 2017-05-24 NOTE — PLAN OF CARE
Problem: Goal Outcome Summary  Goal: Goal Outcome Summary  Outcome: No Change  Tube feeding restarted @ 1730. Pt requested that rate be started @ 15cc/hr and didn't want to increase rate throughout shift. Up to bedside commode with assist of one. Tolerated activity well. VSS. Sats 100% on 2L NC. Chest dressing dry and intact with shadow of drainage on dressing. Medicated for pain with Dilaudid and Oxycodone. Able to make needs known.

## 2017-05-24 NOTE — PLAN OF CARE
Problem: Goal Outcome Summary  Goal: Goal Outcome Summary  Outcome: No Change  Vitals:     05/23/17 2005 05/23/17 2229 05/24/17 0546 05/24/17 0730   BP: 120/58 125/53   122/55   BP Location: Right arm Right arm   Right arm   Pulse:           Resp: 21 23 20   Temp: 97.2  F (36.2  C) 97.3  F (36.3  C)   97.9  F (36.6  C)   TempSrc: Oral Oral   Oral   SpO2: 100% 100% 100% 93%   Weight:           Height:             Afebrile. VSS. Mouth-breathes when sleeping, applied 2 L/min Oxi-Plus mask, O2 sats remained high 90s throughout shift. On capnography. IPI 8-10. Chest dressing has small shadowing, dressing remains intact. Zosyn administered per orders. PRN oxycodone x2 and scheduled Neurontin for chest pain control, patient slept in between cares. CT to gravity with 10cc serosanguinous drainage. LS diminished. On TF via J-tube at 15cc/hr, patient stopped TF at 0548 due to having two moderate incontinent episodes of watery loose stools. Administered opium per orders. Up to commode with SBA. NPO status. BG 87 and 104 at midnight and 0400. Continue POC.

## 2017-05-24 NOTE — CONSULTS
Inpatient Pain Management Service: Consultation      DATE OF CONSULT: May 24, 2017      REASON FOR PAIN CONSULTATION:  Minerva Blanco is a 72 year old female I am seeing in consultation at the request of Dr. Viki Carmen (pager [phone]) for evaluation and recommendations for her acute on chronic pain.       CHIEF PAIN COMPLAINT: chest wall pain      ASSESSMENT:    EGD and washout in the OR on 5/24 showed a large esophageal perforation for which she will be having daily dressing changes in the OR which is very painful    Lap retrosternal gastric pull-through, manubrium resection, spit fistula takedown c/b anastomotic leak and substernal abscess s/p I&D and mediastinal tube placement on 5/19.     History of Toupet fundoplication 1/2016 with a post operative course that was complicated by esophageal perforation with mediastinal phlegmon.    History of a fib, warfarin now being held - currently on hepairn 5,000 units sq q 8 hrs    History of breast cancer    History of fibromyalgia    History of multiple sclerosis    History of hiatal hernia    History of IBS    Patient has had 2 outpatient pain appointments with the [facility]) however she missed both appointments because she was hospitalized    Patient is opioid tolerant  - Pt reports the goal is to taper off all opioids with healing.        TREATMENT RECOMMENDATIONS/PLAN:   Change oxycodone 10-15 mg po q3hrs PRN - will increase the frequency of pain medication due to daily OR dressing changes. Change back to q 4 hrs when able.   RN to offer this medication q3hrs since oxycodone is the primary pain medication    Change HYDROmorphone 0.3-0.5mg IV q2hrs PRN - will increase the frequency of pain medication due to daily OR dressing changes. Change back to q 3 hrs when able.    HYDROmorphone is to be used for rescue purposes only.  Try and link to activity or around dressing changes if needed.    Continue gabapentin 300mg solution po  q8hrs scheduled - this will help with nerve pain    Consider scheduling acetaminophen solution 975mg po q8hrs while daily dressing changes in OR. Change to PRN when able. - Monitor LFT's    Continue Hydroxyaine 10mg po TID PRN while having daily dressing changes - pt has not been using this medication. DC when able.    Consider changing trazodone 100mg per G-tube q HS - this is a home medication she has been on for years    Defer NSAID to primary team.    Start lidocaine patches 5% on 12 hrs off 12 hrs max 3 patches q 12 hrs PRN and alternate with menthol patches PRN - pt has used lidocaine patches in the past and would like to have them available.    Warm or cool packs PRN    Will continue to follow along until pain management is stable.        ASSESSMENT AND RECOMMENDATIONS DISCUSSED WITH: Pain Team and Dr. Enamorado, Anesthesiologist. Test paged Dr. Carmen pain recommendations were complete along with my phone number in case there are any questions.      Thank you for consulting the Inpatient Pain Management Service.   The above recommendations are to be acted upon at the primary team s discretion.    To reach us:  Mon - Friday 8 AM - 3 PM: Pager [phone]   After hours, weekends and holidays: Primary service should call [phone] for the on-call pain specialist    HISTORY OF PRESENT ILLNESS:  Ms. Blanco is a 72 year-old female with a history of leak s/p Toupet Fundoplication, s/p multiple procedures, ultimately esophagectomy and spit fistula 1/2017, now s/p lap retrosternal gastric pull through, resection of left half of manubrium, spit fistula takedown, J tube, and pharyngostomy placement 4/17 with pharyngostomy replacement 4/12. She was seen in clinic 5/18/17 with concerns for a sternal abscess. Patient stated she had noticed drainage coming out of the wound, totaling about one cup over the last few days. Pt was admitted 5/18/19 to the Thoracic Service.  Patient had surgery on 5/19/17 for anastomotic  leak/substernal abscess.  Pt had a repeat I & D of chest on 5/23/17.  Per Thoracic Service pt will have daily I&D in the OR and would like assistance with pain management.      This morning pt is very cooperative and interactive.  She reports the most pain is in the chest which is constant and varies in intensity.  Pain is achy and dull-sharp.  She states the pain is tolerable on the current regimen but still uncomfortable and she wishes pain control was better.  PTA she was taking oxycodone 10-15mg q 3 hrs and mostly 15mg.  She reports pain at baseline is 6-7 daily and currently 7-8 on a 0/10 VAS. She endorses pain in her R) upper back and believes it is referred pain from the infection.  She used lidocaine patches before which were helpful and would like to have them available.  She denies any muscle spasms or insomnia due to pain.  She is up frequently during the day and night due to diarrhea from tube feeds and antibiotics. She denies any nausea, vomiting, lightheadedness, or weakness.  She is frustrated with the multiple hospital admissions.  She reports increased anxiety at times due to pain but does not take anything for anxiety and does not want to start any medication for anxiety.    We discussed having the oral and IV pain medications doses more frequently since she will be having daily dressing changes in the OR and adding some topical patches for pain.  She was very agreeable to the plan.      PAIN HISTORY:   Location: anterior chest  Duration: last few months worse with dressing changes  Pain intensity: 7-8 - moderate to severe  Quality of the pain: achy, thobbing, sharp  Aggravating factors: dressing changes, position changes, pressure  Relieving factors : lying still and pain medication    CAPA (Clinically Aligned Pain Assessment)  Comfort (How is your pain?): Tolerable with discomfort  Change in Pain (Since your last medication/intervention?): Getting worse  Pain Control (How are your pain treatments  working?): Inadequate pain control  Functioning (Are you able to do activities to get better?) : Can do most things, but pain gets in the way of some   Sleep (Does your pain management allow you to sleep or rest?): Awake with occasional pain       FUNCTIONAL STATUS:  Change:      staying the same  Oral intake:     NPO  Bowel function:    Liquid or diarrhea multiple times per day/night  Activity level:     Up with assistance ambulating in room  Sleep:      Up due to multiple BMs  Mood:      adjusting to situation      REVIEW OF 10 BODY SYSTEMS: 10 point ROS of systems including Constitutional, Eyes, Respiratory, Cardiovascular, Gastroenterology, Genitourinary, Integumentary, Musculoskeletal, Psychiatric were all negative except for pertinent positives noted in my HPI.         CURRENT MEDICATIONS:   Current Facility-Administered Medications Ordered in Epic   Medication Dose Route Frequency Provider Last Rate Last Dose     glucose 40 % gel 15-30 g  15-30 g Oral Q15 Min PRN Qi, Blas Acosta MD        Or     dextrose 50 % injection 25-50 mL  25-50 mL Intravenous Q15 Min PRN Angelica, Blas Acosta MD        Or     glucagon injection 1 mg  1 mg Subcutaneous Q15 Min PRN Qi, Blas Acosta MD         0.9% sodium chloride infusion   Intravenous Continuous Jd Garcia  mL/hr at 05/24/17 0139       acetaminophen (TYLENOL) solution 975 mg  975 mg Oral Q8H PRN Viki Carmen MD   975 mg at 05/21/17 1406     oxyCODONE (ROXICODONE) solution 10-15 mg  10-15 mg Oral Q4H PRN Viki Carmen MD   15 mg at 05/24/17 0540     HYDROmorphone (PF) (DILAUDID) injection 0.3-0.5 mg  0.3-0.5 mg Intravenous Q3H PRN Viki Carmen MD   0.5 mg at 05/23/17 1922     lidocaine (LMX4) kit   Topical Once PRN Viki Carmen MD         heparin lock flush 10 UNIT/ML injection 2-5 mL  2-5 mL Intracatheter Once PRN Viki Carmen MD         artificial saliva (BIOTENE DRY MOUTHWASH) liquid 20 mL  20 mL Swish & Spit 4x Daily PRN Nella  MD Viki   20 mL at 05/21/17 1041     ipratropium - albuterol 0.5 mg/2.5 mg/3 mL (DUONEB) neb solution 3 mL  3 mL Nebulization Q4H PRN Jens Wise MD         lidocaine 1 % 0.5-5 mL  0.5-5 mL Other Once PRN Viki Carmen MD         lidocaine (LMX4) kit   Topical Once PRN Viki Carmen MD         sodium chloride (PF) 0.9% PF flush 5-50 mL  5-50 mL Intracatheter Once PRN Viki Carmen MD         pantoprazole (PROTONIX) suspension 40 mg  40 mg Oral Daily WiseJens ford MD   40 mg at 05/24/17 0852     hydrOXYzine (ATARAX) tablet 10 mg  10 mg Oral TID PRN Kd Liu MD         heparin sodium PF injection 5,000 Units  5,000 Units Subcutaneous Q8H Storm Whitlock MD   5,000 Units at 05/24/17 0505     labetalol (NORMODYNE/TRANDATE) injection 10-40 mg  10-40 mg Intravenous Q10 Min PRN Storm Whitlock MD         albuterol (PROAIR HFA/PROVENTIL HFA/VENTOLIN HFA) Inhaler 4 puff  4 puff Inhalation Q2H PRN Storm Whitlock MD         albuterol neb solution 2.5 mg  2.5 mg Nebulization Q4H PRN Viki Carmen MD         prochlorperazine (COMPAZINE) injection 5 mg  5 mg Intravenous Q6H PRN Viki Carmen MD        Or     prochlorperazine (COMPAZINE) tablet 5 mg  5 mg Oral Q6H PRN Viki Carmen MD        Or     prochlorperazine (COMPAZINE) Suppository 12.5 mg  12.5 mg Rectal Q12H PRN Viki Carmen MD         lidocaine 1 % 1 mL  1 mL Other Q1H PRN Jd Garcia MD         lidocaine (LMX4) kit   Topical Q1H PRN Jd Garcia MD         sodium chloride (PF) 0.9% PF flush 3 mL  3 mL Intracatheter Q1H PRN Jd Garcia MD   20 mL at 05/21/17 0751     sodium chloride (PF) 0.9% PF flush 3 mL  3 mL Intracatheter Q8H Jd Garcia MD   20 mL at 05/24/17 0815     naloxone (NARCAN) injection 0.1-0.4 mg  0.1-0.4 mg Intravenous Q2 Min PRN Jd Garcia MD         ondansetron (ZOFRAN-ODT) ODT tab 4 mg  4 mg Oral Q6H PRN  Jd Garcia MD        Or     ondansetron (ZOFRAN) injection 4 mg  4 mg Intravenous Q6H PRN Jd Garcia MD         piperacillin-tazobactam (ZOSYN) 3.375 g vial to attach to  mL bag  3.375 g Intravenous Q6H Jd Garcia  mL/hr at 05/22/17 0505 3.375 g at 05/24/17 0505     cholestyramine (QUESTRAN) Packet 4 g  1 packet Oral Daily Viki Carmen MD   4 g at 05/24/17 0852     diphenoxylate-atropine (LOMOTIL) liquid 5 mL  5 mL Oral 4x Daily PRN Viki Carmen MD   5 mL at 05/23/17 0115     ferrous sulfate 300 (60 FE) MG/5ML syrup 300 mg  300 mg Per J Tube Daily Viki Carmen MD   300 mg at 05/24/17 0856     fiber modular (NUTRISOURCE FIBER) packet 1 packet  1 packet Per Feeding Tube TID w/meals Viki Carmen MD         gabapentin (NEURONTIN) solution 300 mg  300 mg Oral Q8H Viki Carmen MD   300 mg at 05/24/17 0505     loperamide (IMODIUM) liquid 4 mg  4 mg Oral BID Viki Carmen MD   4 mg at 05/24/17 0853     metoprolol (LOPRESSOR) suspension 25 mg  25 mg Per J Tube BID Viki Carmen MD   25 mg at 05/24/17 0852     multivitamins with minerals (CERTAVITE/CEROVITE) liquid 15 mL  15 mL Oral Daily Viki Carmen MD   15 mL at 05/24/17 0856     opium tincture 10 MG/ML (1%) liquid 6 mg  0.6 mL Oral Q6H Viki Carmen MD   6 mg at 05/24/17 0852     simethicone (MYLICON) suspension 40 mg  40 mg Per Feeding Tube Q6H PRN Viki Carmen MD         traZODone (DESYREL) tablet 50 mg  50 mg Per G Tube At Bedtime Viki Carmen MD   50 mg at 05/23/17 2121     fluconazole (DIFLUCAN) intermittent infusion 200 mg in NaCl  200 mg Intravenous Q24H Jd Garcia MD   200 mg at 05/23/17 2051     bupivacaine 0.25 % - EPINEPHrine 1:200,000 injection    PRN Steven Perez MD   20 mL at 01/21/17 1255     No current Epic-ordered outpatient prescriptions on file.         OUTPATIENT OPIOIDS PRESCRIBED BY:      PRIMARY CARE PROVIDER: Andrea Nino  SPECIALIST: none  [place]  database reviewed: pt is opioid tolerant      HOME/PREVIOUS MEDICATIONS:   Prior to Admission medications    Medication Sig Start Date End Date Taking? Authorizing Provider   oxyCODONE (ROXICODONE) 10 MG IR tablet Take 1 to 1.5 tablet every 3-4 hours PRN pain 5/15/17   Silvia Armstrong APRN CNS   diphenoxylate-atropine (LOMOTIL) 0.5-0.005 mg/mL liquid Take 5 mLs by mouth 4 times daily as needed for diarrhea 5/9/17   Saul Rogers PA-C   fiber modular (NUTRISOURCE FIBER) packet 1 packet by Per Feeding Tube route 3 times daily (with meals) 5/9/17   Saul Rogers PA-C   metoprolol (LOPRESSOR) 10 mg/mL SUSP 2.5 mLs (25 mg) by Per J Tube route 2 times daily 5/9/17   Saul Rogers PA-C   opium tincture 10 mg/mL (1%) liquid Take 0.6 mLs (6 mg) by mouth every 6 hours 5/9/17   Saul Rogers PA-C   ferrous sulfate 300 (60 FE) MG/5ML syrup 5 mLs (300 mg) by Per J Tube route daily 5/9/17   Saul Rogers PA-C   gabapentin (NEURONTIN) 250 MG/5ML solution Take 6 mLs (300 mg) by mouth every 8 hours 4/28/17   Saul Rogers PA-C   traZODone (DESYREL) 100 MG tablet 0.5 tablets (50 mg) by Per G Tube route At Bedtime  Patient taking differently: 100 mg by Per G Tube route At Bedtime  4/28/17   Saul Rogers PA-C   simethicone (MYLICON) 40 MG/0.6ML suspension 0.6 mLs (40 mg) by Per Feeding Tube route every 6 hours as needed for cramping 4/28/17 5/28/17  Saul Rogers PA-C   ranitidine (ZANTAC) 150 MG/10ML syrup Take 10 mLs (150 mg) by mouth 2 times daily 4/28/17   Saul Rogers PA-C   warfarin (COUMADIN) 2.5 MG tablet Take 1 tablet (2.5 mg) by mouth daily  Patient not taking: Reported on 5/18/2017 4/27/17   Sandra John, CLIVE CNP   order for DME Equipment being ordered:   [facility] Suction Machine-Intermittent  Suction Canisters(2)  Suction Canister Holders(2)  Suction Connect Tube(2)  5 in 1 Connector(2)  Bacteria Filter(2)  Amrita  Suction(2)    Treatment Diagnosis: Esophogeal Perforation, s/p esophagectomy 4/26/17   Blas Dominguez MD   order for DME Equipment being ordered:   Suction Pump  Suction Canister(2)  Suction Tubing(2)  Bacteria Filter(2)  Yaunkauer(4)  Red Rubber Catheter(2)    Treatment Diagnosis: Esophogeal Perforation, s/p esophagectomy 4/26/17   Blas Dominguez MD   DiphenhydrAMINE HCl (BENADRYL PO) Take 25 mg by mouth nightly as needed    Unknown, Entered By History   cholestyramine (QUESTRAN) 4 G Packet Take 1 packet by mouth daily    Unknown, Entered By History   multivitamins with minerals (CERTAVITE/CEROVITE) LIQD liquid Take 15 mLs by mouth daily    Unknown, Entered By History   loperamide (IMODIUM) 1 MG/5ML liquid Take 20 mLs (4 mg) by mouth 4 times daily as needed for diarrhea  Patient taking differently: Take 4 mg by mouth 2 times daily  1/25/17   Saul Rogers, REJI   Lactobacillus Rhamnosus, GG, (CULTURELLE PO) Take 1 tablet by mouth 2 times daily     Reported, Patient       ALLERGIES:    Allergies   Allergen Reactions     Amoxicillin Diarrhea     Ativan [Lorazepam] Other (See Comments)     Hallucinations     Hydromorphone Itching     4/12/17 - patient open to using this as she tolerated Hydromorphone PCA during hospitalization in January 2017.      Morphine Itching          PAST MEDICAL AND PSYCHIATRIC HISTORY:    Past Medical History:   Diagnosis Date     Atrial fibrillation (H)      Breast cancer (H) 2007    right side - lumpectomy, chemo, and local radiation     Esophageal perforation      Fibromyalgia      GERD (gastroesophageal reflux disease)      Hiatal hernia      History of blood transfusion      IBS (irritable bowel syndrome)      Meniere's disease     deaf left ear     MS (multiple sclerosis) (H)          PAST SURGICAL HISTORY:   Past Surgical History:   Procedure Laterality Date     APPENDECTOMY       BACK SURGERY  5/1/2015    lumbar fusion     BRONCHOSCOPY FLEXIBLE N/A 4/17/2017    Procedure:  BRONCHOSCOPY FLEXIBLE;;  Surgeon: Jens Wise MD;  Location: [facility] OR     C GASTROSTOMY/JEJUN TUBE      J tube for feedings     CLOSE SPIT FISTULA N/A 4/17/2017    Procedure: CLOSE SPIT FISTULA;;  Surgeon: Jens Wise MD;  Location: [facility]     COMBINED ESOPHAGOSCOPY, GASTROSCOPY, DUODENOSCOPY (EGD), REMOVE ESOPHAGEAL STENT N/A 8/26/2016    Procedure: COMBINED ESOPHAGOSCOPY, GASTROSCOPY, DUODENOSCOPY (EGD), REMOVE ESOPHAGEAL STENT;  Surgeon: Jens Wise MD;  Location: [facility]     CREATE SPIT FISTULA N/A 1/9/2017    Procedure: CREATE SPIT FISTULA;  Surgeon: Jens Wise MD;  [facility]     ESOPHAGECTOMY N/A 1/9/2017    Procedure: ESOPHAGECTOMY;  Surgeon: Jens Wise MD;  [facility]     ESOPHAGOSCOPY, GASTROSCOPY, DUODENOSCOPY (EGD), COMBINED N/A 4/18/2016    Procedure: COMBINED ESOPHAGOSCOPY, GASTROSCOPY, DUODENOSCOPY (EGD);  Surgeon: Alexis Barraza MD;  [facility]     ESOPHAGOSCOPY, GASTROSCOPY, DUODENOSCOPY (EGD), COMBINED N/A 4/25/2016    Procedure: COMBINED ESOPHAGOSCOPY, GASTROSCOPY, DUODENOSCOPY (EGD);  Surgeon: Alexis Barraza MD;  [facility]     ESOPHAGOSCOPY, GASTROSCOPY, DUODENOSCOPY (EGD), COMBINED N/A 5/4/2016    Procedure: COMBINED ESOPHAGOSCOPY, GASTROSCOPY, DUODENOSCOPY (EGD);  Surgeon: Alexis Barraza MD;  [facility]     ESOPHAGOSCOPY, GASTROSCOPY, DUODENOSCOPY (EGD), COMBINED N/A 5/18/2016    Procedure: COMBINED ESOPHAGOSCOPY, GASTROSCOPY, DUODENOSCOPY (EGD);  Surgeon: Alexis Barraza MD;  [facility]     ESOPHAGOSCOPY, GASTROSCOPY, DUODENOSCOPY (EGD), COMBINED N/A 6/22/2016    Procedure: COMBINED ESOPHAGOSCOPY, GASTROSCOPY, DUODENOSCOPY (EGD);  Surgeon: Alexis Barraza MD;  [facility]     ESOPHAGOSCOPY, GASTROSCOPY, DUODENOSCOPY (EGD), COMBINED N/A 7/12/2016    Procedure: COMBINED ESOPHAGOSCOPY, GASTROSCOPY, DUODENOSCOPY (EGD);  Surgeon: Frandy  Jens Saul MD;  [facility]     ESOPHAGOSCOPY, GASTROSCOPY, DUODENOSCOPY (EGD), COMBINED N/A 4/21/2017    Procedure: COMBINED ESOPHAGOSCOPY, GASTROSCOPY, DUODENOSCOPY (EGD);  Esophagogastroduodenoscopy, Pharyngostomy Tube Placement ;  Surgeon: Jens Wise MD;  [facility]     ESOPHAGOSCOPY, GASTROSCOPY, DUODENOSCOPY (EGD), COMBINED N/A 5/19/2017    Procedure: COMBINED ESOPHAGOSCOPY, GASTROSCOPY, DUODENOSCOPY (EGD);  Upper Endoscopy, Irrigation and Debridement of Chest Wound ;  Surgeon: Nalini Barreto MD;  [facility]     ESOPHAGOSCOPY, GASTROSCOPY, DUODENOSCOPY (EGD), DILATATION, COMBINED N/A 6/29/2016    Procedure: COMBINED ESOPHAGOSCOPY, GASTROSCOPY, DUODENOSCOPY (EGD), DILATATION;  Surgeon: Jens Wise MD;  [facility]     EXPLORE NECK N/A 5/19/2017    Procedure: EXPLORE NECK;;  Surgeon: Nalini Barreto MD;  [facility]     HC UGI ENDOSCOPY W TRANSENDOSCOPIC STENT PLACEMENT N/A 7/12/2016    Procedure: COMBINED ESOPHAGOSCOPY, GASTROSCOPY, DUODENOSCOPY (EGD), PLACE TRANSENDOSCOPIC ESOPHAGEAL STENT;  Surgeon: Jens Wise MD;  [facility]     HC UGI ENDOSCOPY W TRANSENDOSCOPIC STENT PLACEMENT N/A 7/22/2016    Procedure: COMBINED ESOPHAGOSCOPY, GASTROSCOPY, DUODENOSCOPY (EGD), PLACE TRANSENDOSCOPIC ESOPHAGEAL STENT;  Surgeon: Jens Wise MD;  [facility]     IRRIGATION AND DEBRIDEMENT CHEST WASHOUT, COMBINED N/A 6/29/2016    Procedure: COMBINED IRRIGATION AND DEBRIDEMENT CHEST WASHOUT;  Surgeon: Jens Wise MD;  [facility]     LAPAROSCOPIC ASSISTED INSERTION TUBE JEJUNOSTOMY N/A 4/17/2017    Procedure: LAPAROSCOPIC ASSISTED INSERTION TUBE JEJUNOSTOMY;;  Surgeon: Jens Wise MD;  [facility]     LAPAROSCOPY DIAGNOSTIC (GENERAL) N/A 1/9/2017    Procedure: LAPAROSCOPY DIAGNOSTIC (GENERAL);  Surgeon: Jens Wise MD;  [facility]     LAPAROSCOPY DIAGNOSTIC (GENERAL) N/A 4/17/2017  "   Procedure: LAPAROSCOPY DIAGNOSTIC (GENERAL);  Laparoscopic , Neck Dissection, Spit Fistula Takedown, Laparoscopic Jejunostomy Tube and Pharyngostomy Tube, Gastric Pull up, Upper Endoscopy(EGD)  , Flexible Bronchoscopy ,Sternotomy ;  Surgeon: Jens Wise MD;  [facility]     LUMPECTOMY BREAST Right 2007     NERVE BLOCK PERIPHERAL N/A 8/30/2016    Procedure: NERVE BLOCK PERIPHERAL;  Surgeon: GENERIC ANESTHESIA PROVIDER;  [facility]     NISSEN FUNDOPLICATION  1/2016     PHARYNGOSTOMY N/A 4/18/2016    Procedure: PHARYNGOSTOMY;  Surgeon: Alexis Barraza MD;  [facility]     PHARYNGOSTOMY N/A 4/25/2016    Procedure: PHARYNGOSTOMY;  Surgeon: Alexsi Barraza MD;  [facility]     PHARYNGOSTOMY N/A 5/4/2016    Procedure: PHARYNGOSTOMY;  Surgeon: Alexis Barraza MD;  [facility]     PHARYNGOSTOMY N/A 6/22/2016    Procedure: PHARYNGOSTOMY;  Surgeon: Alexis Barraza MD;  [facility]     PHARYNGOSTOMY N/A 6/29/2016    Procedure: PHARYNGOSTOMY;  Surgeon: Jens Wise MD;  [facility]     PHARYNGOSTOMY N/A 4/21/2017    Procedure: PHARYNGOSTOMY;;  Surgeon: Jens Wise MD;  [facility]     PICC INSERTION Left 8/25/2016    5fr DL BioFlo PICC, 42cm (3cm external) in the L medial brachial vein w/ tip in the SVC RA junction.     THORACOTOMY Right 1998    lung infection - \"hard crust formed on lung\"     THORACOTOMY Left 1/9/2017    Procedure: THORACOTOMY;  Surgeon: Jens Wise MD;  [facility]     WRIST SURGERY         FAMILY HISTORY: family history includes Alzheimer Disease in her mother; Breast Cancer in her daughter; CEREBROVASCULAR DISEASE in her father; Ovarian Cancer in her sister.      HEALTH & LIFESTYLE PRACTICES:   Tobacco:  reports that she quit smoking about 19 years ago. Her smoking use included Cigarettes. She has a 15.00 pack-year smoking history. She does not have any smokeless tobacco " history on file.  Alcohol:  reports that she does not drink alcohol.  Illicit drugs:  reports that she does not use illicit drugs.      SOCIAL HISTORY: lives with  in [place], [place]      LABORATORY VALUES:   Last Basic Metabolic Panel:  Lab Results   Component Value Date     05/22/2017      Lab Results   Component Value Date    POTASSIUM 4.3 05/22/2017     Lab Results   Component Value Date    CHLORIDE 108 05/22/2017     Lab Results   Component Value Date    ANNABELLE 7.8 05/22/2017     Lab Results   Component Value Date    CO2 28 05/22/2017     Lab Results   Component Value Date    BUN 8 05/22/2017     Lab Results   Component Value Date    CR 0.42 05/22/2017     Lab Results   Component Value Date    GLC 71 05/22/2017       CBC results:  Lab Results   Component Value Date    WBC 12.9 05/23/2017     Lab Results   Component Value Date    HGB 8.1 05/23/2017     Lab Results   Component Value Date    HCT 26.3 05/23/2017     Lab Results   Component Value Date     05/23/2017       DIAGNOSTIC TESTS:   EXAMINATION: Chest CT 5/18/2017     CLINICAL HISTORY: Wound dehiscence, rule out abscess.     COMPARISON: CT 5/3/2017.     TECHNIQUE: CT imaging obtained through the chest with intravenous  contrast. Coronal, sagittal and axial MIP reformatted images obtained.     FINDINGS:  Postoperative changes of esophagectomy, and retrosternal gastric  pull-through with EG anastomosis in the mid/inferior portion of the  anterior mediastinum. Adjacent to the anastomosis, there is a small  apparent defect within the esophageal lumen (series 2, image 29). This  appears to connect with a air and fluid-containing collection in the  anterior mediastinum, which is enlarged with the prior exam.  Superiorly, this collection may also be contiguous with a smaller  air-filled collection at the site of the partial manubrium resection  (series 2, image 21). Associated inflammatory changes are noted  throughout the anterior mediastinum, with  associated small pericardial  effusion. There is an open dermal wound at the site of this superior  collection (series 2, image 24).     Heart size is normal. Normal branching pattern of the aorta.  Unremarkable thyroid. Limited evaluation of the central pulmonary  vasculature is normal. Mild atherosclerotic calcifications of the  coronary vasculature. There are a few enlarged mediastinal lymph  nodes, increased in size since the prior exam. For example, there is a  1.0 x 1.7 cm precarinal lymph node (series 2, image 25). Calcification  bilaterally within the breasts.     Redemonstration of multifocal nodular foci and groundglass opacities,  which are relatively unchanged in the right lung, but are improved in  the left lower lobe. No new airspace disease. No pleural effusion. No  pneumothorax. Tracheobronchial tree is patent.     Limited evaluation of the superior abdomen is unremarkable.     Left-sided rib defect is presumably postoperative.         IMPRESSION:   1. Postoperative changes of esophagectomy with retrosternal gastric  pull-through. There is concern for esophageal perforation at the site  of the anastomosis, with an adjacent air and fluid collection and  inflammatory changes throughout the anterior mediastinum, highly  concerning for abscess.  2. Multifocal areas of consolidation and groundglass opacity are  unchanged in the right lung, but improved in the left lower lobe, and  are again concerning for infection.    Labs above reviewed as well as additional relevant diagnostic studies from the EPIC record.       PHYSICAL EXAMINATION:  VITAL SIGNS:  B/P: 122/55, T: 97.9, P: 76, R: 20    CONSTITUTIONAL/GENERAL APPEARANCE: elderly, frail female lying in hospital bed NAD  EYES: PERRLA, sclera clear  ENT/NECK: R) neck dressing c/d/i  RESPIRATORY: respirations even, unlabored on room air  CARDIOVASCULAR: acyanotic, +1 edema in lower extremities  GI:abdomen nondistended, nontender to palpation except to  G-tube site which tender, dressing intact to G-Tube and Chest tube  MUSCULOSKELETAL/Chest/EXTREMITIES: moves all extremities spontaneously, chest dressing c/d/i, did not remove dressing, chest tube draining into ojeda    GAIT: not observed  NEURO:  Alert, oriented, answers questions appropriately, strength in upper and lower extremities 5/5, no allodynia to abdomen, neck, chest  SKIN/VASCULAR EXAM:  Extremities are warm and dry  PSYCHIATRIC/BEHAVIORAL/OBSERVATIONS:  No objective signs of pain observed during our interview.   Judgment/Insight - intact   Orientation - intact   Memory - good historian   Mood and affect - appropriate       Bridgett Gomez, CLIVE CNP  May 24, 2017, 9:10 AM  Inpatient Pain Management Service

## 2017-05-24 NOTE — PLAN OF CARE
Problem: Goal Outcome Summary  Goal: Goal Outcome Summary  Outcome: No Change  Slept until about 10 AM and then requested Oxycodone 15mg for chest and Right upper back pain. Up to commode independently for voiding with small amounts of liquid stool. Pt turned TF off and declined starting it again x2. Seen by pain team. Lidoderm patch started this afternoon. Oxycodone increased to every 3 hr instead of 4 with scheduled Tylenol. To OR approximately 1430. Medication given down J-tube; NPO.

## 2017-05-24 NOTE — PLAN OF CARE
Problem: Goal Outcome Summary  Goal: Goal Outcome Summary  OT 7B: Cancel - pt away at procedure this PM, will reschedule for tomorrow.

## 2017-05-24 NOTE — OR NURSING
Dr. MARLENI Enriquez notified blood glucose in pre op 77.  Dr. Enriquez to order IV dextrose.    Patient stated increase in pain.  Dr. Enriquez will assess patient in pre op.

## 2017-05-24 NOTE — ANESTHESIA POSTPROCEDURE EVALUATION
Patient: Minerva Blanco    Procedure(s):  Chest wound Washout and Dressing Change - Wound Class: I-Clean    Diagnosis:Non healing wound   Diagnosis Additional Information: No value filed.    Anesthesia Type:  MAC    Note:  Anesthesia Post Evaluation    Patient location during evaluation: Bedside  Patient participation: Able to fully participate in evaluation  Level of consciousness: awake  Pain management: adequate  Airway patency: patent  Cardiovascular status: hemodynamically stable  Respiratory status: acceptable  Hydration status: acceptable  PONV: none     Anesthetic complications: None          Last vitals:  Vitals:    05/24/17 0730 05/24/17 1100 05/24/17 1445   BP: 122/55 125/56 122/64   Pulse:      Resp: 20 20 16   Temp: 36.6  C (97.9  F)  37  C (98.6  F)   SpO2: 93% 99% 97%         Electronically Signed By: Anaya Enriquez MD  May 24, 2017  6:13 PM

## 2017-05-24 NOTE — OP NOTE
DATE OF PROCEDURE:  2017      PREOPERATIVE DIAGNOSIS:  Anastomotic leak, status post esophagectomy with mediastinal abscess, status post incision and drainage.      POSTOPERATIVE DIAGNOSIS:  Anastomotic leak, status post esophagectomy with mediastinal abscess, status post incision and drainage.      PROCEDURES PERFORMED:  Esophagogastroduodenoscopy and debridement and washout of mediastinal wound.      SURGEON:  Conchis Marrero MD      ASSISTANT SURGEON:  Viki Carmen MD        OPERATIVE FINDINGS:  Definite defect in the GI tract just past the esophagogastric anastomosis, mediastinal cavity with some purulent drainage but wide open and easy to pack.      DESCRIPTION OF PROCEDURE:  After obtaining informed consent, Favian Malone was brought to the operating room and laid supine on the operating table.  After induction of general anesthesia and introduction of an endotracheal tube, the chest was prepped and draped in a sterile manner.  The packing was removed.  The neck wound and the right chest wounds both appeared reasonably clean.  We were able to wash and irrigate these 2 cavities which connected.  During this process the area of the esophagus concerning for defect was visualized.  At this point a pediatric gastroscope was passed orally, and this actually made its way out through the defect, confirming a decent sized defect in the anastomosis.  The conduit, however, still looked viable.  At this point we proceeded to thoroughly irrigate the cavity and repacked it with Kerlix gauze with a plan to return to the OR in the next few days for continued washout and debridement.  The patient was then recovered and transferred to the recovery room in stable condition.         CONCHIS MARRERO MD             D: 2017 22:44   T: 2017 09:04   MT: nigel      Name:     FAVIAN MALONE   MRN:      [id number]        Account:        [id number]   :      1945           Procedure Date: 2017       Document: [id number]

## 2017-05-24 NOTE — PROGRESS NOTES
"CLINICAL NUTRITION SERVICES - REASSESSMENT NOTE  *Received consult: Provider order - \"On tube feeds. Having some difficult to control diarrhea. Please adjust formula to minimize diarrhea risk\"     Nutrition Prescription    RECOMMENDATIONS FOR MDs/PROVIDERS TO ORDER:  1. Recommend med review to determine if any meds that may be causing/exacerbating diarrhea can be modified  2. Consider GI consult or appropriate GI tests to try and determine cause of ongoing diarrhea  3. Given ongoing issues with diarrhea while on TF (pt has tried numerous TF formulas throughout previous admissions), consider need for TPN    Malnutrition Status:    Severe malnutrition in the context of acute on chronic illness    Recommendations already ordered by Registered Dietitian (RD):  1. Adjust TF orders to restart @ 15 mL/hr and adv by 10 mL q8h as tolerated and if K+/Mg++ >/= nrml and phos >1.9  to goal rate  2. Pt now with minimal nutrition intakes x 5 days since admit and is at refeeding risk. Ordered BMP, Mg++, Phos today and x 3 days while adv to goal TF rate to watch for signs of refeeding  3. Changed Certavite to Thera-Vit-M (to be crushed and given via J-tube) as liquid MVI could be exacerbating diarrhea    Future/Additional Recommendations:  If becomes appropriate to start PN given ongoing TF intolerances, would recommend starting TPN.  Would rec start TPN @ 60 mL/hr (1440 mL/day, or per provider pending fluid status/IVF requirements) with 100 g dextrose, 75 g AA, and 250 mL 20% IV lipids 5x/week. Rec adv dextrose by 25-50 g/day if K+/Mg++ >/= nrml and phos >1.9 and BGs stable to goal dextrose 175 g/day.  Goal TPN would provide 1252 kcal (29 kcal/kg), 1.7 g/kg PRO, GIR 2.8 mg/kg/min, and 29% kcal from fat per dosing weight 43 kg     EVALUATION OF THE PROGRESS TOWARD GOALS   Diet: No diet ordered    Nutrition Support: Peptamen 1.5 @ goal 45 ml/hr (1080 ml/day) to provide 1620 kcals, 73 g PRO, 832 ml free H2O, 60 g Fat (70% from MCTs), " 203 g CHO and no Fiber daily.     Modulars: Nutrisource fiber (1 pkt TID).  Per chart pt hasn't been getting these since admit 2/2 refusing and TF being held.    Free Water: 60 mL q4h     Intake: Started TF on admit 5/18, has had much difficulty getting to and staying at goal TF rate 2/2 diarrhea (writer known to pt from previous admissions, this has been ongoing issue). TF currently @ 15 ml/hr. 5-day avg TF intake = 181 mL/day Peptamen 1.5 which provided  272 kcal and 12 g PRO which met 18% kcal and PRO needs per dosing weight 43 kg     NEW FINDINGS   Weight: Wt up from admit, likely all fluid related.  Dosing weight to remain 43 kg.  Labs: Last BMP/Mg++/Phos was on 5/22.   Meds: Imodium, Tincture of Opium, IVF @ 100 mL/hr  GI: ongoing diarrhea issues. Pt reports diarrhea was better while at home before current admission (she believes in part due to off antibiotics), but says now since admit diarrhea an issue again. Reports she hasn't been trying Nutrisource Fiber, but is agreeable to start trying it again  Procedures: underwent I&D chest washout and EGD yesterday    ASSESSED NUTRITION NEEDS per dosing weight 43 kg  Estimated Energy Needs: 9783-1227 kcals/day (35 - 40+ kcals/kg for EN); 7756-6014 (30-35 kcal/kg for PN)  Justification: Post-op and Repletion; aim lower end if requires PN to prevent overfeeding  Estimated Protein Needs: 65 - 86 grams protein/day (1.5 - 2+ grams of pro/kg)  Justification: Post-op and Repletion    MALNUTRITION  % Intake: </= 50% for >/= 5 days (severe)  % Weight Loss: None noted; wt loss PTA did not meet criteria  Subcutaneous Fat Loss: Facial region, Upper arm, Lower arm and Thoracic/intercostal:  Moderate  Muscle Loss: Temporal, Facial & jaw region, Thoracic region (clavicle, acromium bone, deltoid, trapezius, pectoral), Upper arm (bicep, tricep) and Lower arm  (forearm):  Moderate  Fluid Accumulation/Edema: Trace-mild per nursing documentation yesterday  Malnutrition Diagnosis:  Severe malnutrition in the context of acute on chronic illness    Previous Goals   Total avg nutritional intake to meet a minimum of 35 kcal/kg and 1.5 g PRO/kg daily (per dosing wt 43 kg).  Evaluation: Not met    Previous Nutrition Diagnosis  Inadequate protein-energy intake related to increased nutritional needs with post-op and repletion and inadequate enteral infusion as evidenced by 3% wt loss x past 2 weeks, TF @ 35 mL/hr at home per pt (meets 84% minimum kcal needs, 87% minimum protein needs).     Evaluation: Declining, updated    CURRENT NUTRITION DIAGNOSIS  Inadequate protein-energy intake related to GI distress associated with TF running as evidenced by TF intermittently on hold or at lower rate since admit and  5-day avg TF intake met 18% kcal and PRO needs       INTERVENTIONS  Implementation  Collaboration with other providers: discussed nutrition POC with team.  Writer asked if PN being considered, team trying to avoid it if possible due to associated risks.  Advised may need PN to be reconsidered if she isn't able to start tolerating TF in the next 1-2 days, team aware of RD recs. Recommended a med review to MD to see if any meds that may be exacerbating diarrhea can be changed.  Nutrition Education: encouraged pt to take Nutrisource fiber TID as this may help bulk stool, patient agreeable  Enteral Nutrition - Modify orders to start advancing TF     Goals  1. Total avg nutritional intake to meet a minimum of 35 kcal/kg and 1.5 g PRO/kg daily (per dosing wt 43 kg).    Monitoring/Evaluation  Progress toward goals will be monitored and evaluated per protocol.     Evie Montanez RD, LD   [address]B RD Pager: [phone]

## 2017-05-25 ENCOUNTER — ANESTHESIA EVENT (OUTPATIENT)
Dept: SURGERY | Facility: CLINIC | Age: 72
DRG: 856 | End: 2017-05-25
Payer: MEDICARE

## 2017-05-25 ENCOUNTER — ANESTHESIA (OUTPATIENT)
Dept: SURGERY | Facility: CLINIC | Age: 72
DRG: 856 | End: 2017-05-25
Payer: MEDICARE

## 2017-05-25 LAB
ANION GAP SERPL CALCULATED.3IONS-SCNC: 9 MMOL/L (ref 3–14)
BACTERIA SPEC CULT: NO GROWTH
BACTERIA SPEC CULT: NO GROWTH
BASOPHILS # BLD AUTO: 0 10E9/L (ref 0–0.2)
BASOPHILS NFR BLD AUTO: 0 %
BUN SERPL-MCNC: 3 MG/DL (ref 7–30)
CALCIUM SERPL-MCNC: 5.7 MG/DL (ref 8.5–10.1)
CALCIUM SERPL-MCNC: 8.2 MG/DL (ref 8.5–10.1)
CHLORIDE SERPL-SCNC: 110 MMOL/L (ref 94–109)
CO2 SERPL-SCNC: 24 MMOL/L (ref 20–32)
CREAT SERPL-MCNC: 0.45 MG/DL (ref 0.52–1.04)
DIFFERENTIAL METHOD BLD: ABNORMAL
EOSINOPHIL # BLD AUTO: 0.3 10E9/L (ref 0–0.7)
EOSINOPHIL NFR BLD AUTO: 3.8 %
ERYTHROCYTE [DISTWIDTH] IN BLOOD BY AUTOMATED COUNT: 16 % (ref 10–15)
GFR SERPL CREATININE-BSD FRML MDRD: ABNORMAL ML/MIN/1.7M2
GLUCOSE SERPL-MCNC: 82 MG/DL (ref 70–99)
HCT VFR BLD AUTO: 23.1 % (ref 35–47)
HGB BLD-MCNC: 7.2 G/DL (ref 11.7–15.7)
IMM GRANULOCYTES # BLD: 0.1 10E9/L (ref 0–0.4)
IMM GRANULOCYTES NFR BLD: 0.6 %
LYMPHOCYTES # BLD AUTO: 1.1 10E9/L (ref 0.8–5.3)
LYMPHOCYTES NFR BLD AUTO: 13.7 %
MAGNESIUM SERPL-MCNC: 1.8 MG/DL (ref 1.6–2.3)
MCH RBC QN AUTO: 27.7 PG (ref 26.5–33)
MCHC RBC AUTO-ENTMCNC: 31.2 G/DL (ref 31.5–36.5)
MCV RBC AUTO: 89 FL (ref 78–100)
MICRO REPORT STATUS: NORMAL
MICRO REPORT STATUS: NORMAL
MONOCYTES # BLD AUTO: 1 10E9/L (ref 0–1.3)
MONOCYTES NFR BLD AUTO: 12.8 %
NEUTROPHILS # BLD AUTO: 5.3 10E9/L (ref 1.6–8.3)
NEUTROPHILS NFR BLD AUTO: 69.1 %
NRBC # BLD AUTO: 0 10*3/UL
NRBC BLD AUTO-RTO: 0 /100
PHOSPHATE SERPL-MCNC: 2.9 MG/DL (ref 2.5–4.5)
PLATELET # BLD AUTO: 474 10E9/L (ref 150–450)
POTASSIUM SERPL-SCNC: 4.4 MMOL/L (ref 3.4–5.3)
RBC # BLD AUTO: 2.6 10E12/L (ref 3.8–5.2)
SODIUM SERPL-SCNC: 143 MMOL/L (ref 133–144)
SPECIMEN SOURCE: NORMAL
SPECIMEN SOURCE: NORMAL
WBC # BLD AUTO: 7.7 10E9/L (ref 4–11)

## 2017-05-25 PROCEDURE — 36000057 ZZH SURGERY LEVEL 3 1ST 30 MIN - UMMC: Performed by: STUDENT IN AN ORGANIZED HEALTH CARE EDUCATION/TRAINING PROGRAM

## 2017-05-25 PROCEDURE — 25000128 H RX IP 250 OP 636: Performed by: THORACIC SURGERY (CARDIOTHORACIC VASCULAR SURGERY)

## 2017-05-25 PROCEDURE — 37000009 ZZH ANESTHESIA TECHNICAL FEE, EACH ADDTL 15 MIN: Performed by: STUDENT IN AN ORGANIZED HEALTH CARE EDUCATION/TRAINING PROGRAM

## 2017-05-25 PROCEDURE — 27210794 ZZH OR GENERAL SUPPLY STERILE: Performed by: STUDENT IN AN ORGANIZED HEALTH CARE EDUCATION/TRAINING PROGRAM

## 2017-05-25 PROCEDURE — 25000128 H RX IP 250 OP 636: Performed by: ANESTHESIOLOGY

## 2017-05-25 PROCEDURE — 84100 ASSAY OF PHOSPHORUS: CPT | Performed by: STUDENT IN AN ORGANIZED HEALTH CARE EDUCATION/TRAINING PROGRAM

## 2017-05-25 PROCEDURE — 40000169 ZZH STATISTIC PRE-PROCEDURE ASSESSMENT I: Performed by: STUDENT IN AN ORGANIZED HEALTH CARE EDUCATION/TRAINING PROGRAM

## 2017-05-25 PROCEDURE — 25000125 ZZHC RX 250: Performed by: ANESTHESIOLOGY

## 2017-05-25 PROCEDURE — 40000671 ZZH STATISTIC ANESTHESIA CASE

## 2017-05-25 PROCEDURE — 37000008 ZZH ANESTHESIA TECHNICAL FEE, 1ST 30 MIN: Performed by: STUDENT IN AN ORGANIZED HEALTH CARE EDUCATION/TRAINING PROGRAM

## 2017-05-25 PROCEDURE — 0JD60ZZ EXTRACTION OF CHEST SUBCUTANEOUS TISSUE AND FASCIA, OPEN APPROACH: ICD-10-PCS | Performed by: STUDENT IN AN ORGANIZED HEALTH CARE EDUCATION/TRAINING PROGRAM

## 2017-05-25 PROCEDURE — A9270 NON-COVERED ITEM OR SERVICE: HCPCS | Mod: GY | Performed by: PHYSICIAN ASSISTANT

## 2017-05-25 PROCEDURE — 99207 ZZC NO CHARGE FOLLOW UP PS: CPT

## 2017-05-25 PROCEDURE — 36000059 ZZH SURGERY LEVEL 3 EA 15 ADDTL MIN UMMC: Performed by: STUDENT IN AN ORGANIZED HEALTH CARE EDUCATION/TRAINING PROGRAM

## 2017-05-25 PROCEDURE — 25000132 ZZH RX MED GY IP 250 OP 250 PS 637: Mod: GY | Performed by: STUDENT IN AN ORGANIZED HEALTH CARE EDUCATION/TRAINING PROGRAM

## 2017-05-25 PROCEDURE — 25000132 ZZH RX MED GY IP 250 OP 250 PS 637: Mod: GY | Performed by: SURGERY

## 2017-05-25 PROCEDURE — 25000128 H RX IP 250 OP 636: Performed by: SURGERY

## 2017-05-25 PROCEDURE — 27210437 ZZH NUTRITION PRODUCT SEMIELEM INTERMED LITER

## 2017-05-25 PROCEDURE — A9270 NON-COVERED ITEM OR SERVICE: HCPCS | Mod: GY | Performed by: THORACIC SURGERY (CARDIOTHORACIC VASCULAR SURGERY)

## 2017-05-25 PROCEDURE — 25000132 ZZH RX MED GY IP 250 OP 250 PS 637: Mod: GY | Performed by: THORACIC SURGERY (CARDIOTHORACIC VASCULAR SURGERY)

## 2017-05-25 PROCEDURE — A9270 NON-COVERED ITEM OR SERVICE: HCPCS | Mod: GY | Performed by: STUDENT IN AN ORGANIZED HEALTH CARE EDUCATION/TRAINING PROGRAM

## 2017-05-25 PROCEDURE — 25000125 ZZHC RX 250: Performed by: SURGERY

## 2017-05-25 PROCEDURE — 12000003 ZZH R&B CRITICAL UMMC

## 2017-05-25 PROCEDURE — 25000128 H RX IP 250 OP 636: Performed by: STUDENT IN AN ORGANIZED HEALTH CARE EDUCATION/TRAINING PROGRAM

## 2017-05-25 PROCEDURE — 36592 COLLECT BLOOD FROM PICC: CPT | Performed by: STUDENT IN AN ORGANIZED HEALTH CARE EDUCATION/TRAINING PROGRAM

## 2017-05-25 PROCEDURE — 80048 BASIC METABOLIC PNL TOTAL CA: CPT | Performed by: STUDENT IN AN ORGANIZED HEALTH CARE EDUCATION/TRAINING PROGRAM

## 2017-05-25 PROCEDURE — 25000125 ZZHC RX 250: Performed by: STUDENT IN AN ORGANIZED HEALTH CARE EDUCATION/TRAINING PROGRAM

## 2017-05-25 PROCEDURE — 85025 COMPLETE CBC W/AUTO DIFF WBC: CPT | Performed by: SURGERY

## 2017-05-25 PROCEDURE — 83735 ASSAY OF MAGNESIUM: CPT | Performed by: STUDENT IN AN ORGANIZED HEALTH CARE EDUCATION/TRAINING PROGRAM

## 2017-05-25 PROCEDURE — 25000132 ZZH RX MED GY IP 250 OP 250 PS 637: Mod: GY | Performed by: PHYSICIAN ASSISTANT

## 2017-05-25 PROCEDURE — 00000146 ZZHCL STATISTIC GLUCOSE BY METER IP

## 2017-05-25 PROCEDURE — A9270 NON-COVERED ITEM OR SERVICE: HCPCS | Mod: GY | Performed by: SURGERY

## 2017-05-25 PROCEDURE — 82310 ASSAY OF CALCIUM: CPT | Performed by: STUDENT IN AN ORGANIZED HEALTH CARE EDUCATION/TRAINING PROGRAM

## 2017-05-25 RX ORDER — OXYCODONE HCL 5 MG/5 ML
10-15 SOLUTION, ORAL ORAL
Status: DISCONTINUED | OUTPATIENT
Start: 2017-05-25 | End: 2017-05-26

## 2017-05-25 RX ORDER — SODIUM CHLORIDE, SODIUM LACTATE, POTASSIUM CHLORIDE, CALCIUM CHLORIDE 600; 310; 30; 20 MG/100ML; MG/100ML; MG/100ML; MG/100ML
INJECTION, SOLUTION INTRAVENOUS CONTINUOUS
Status: DISCONTINUED | OUTPATIENT
Start: 2017-05-25 | End: 2017-05-25 | Stop reason: HOSPADM

## 2017-05-25 RX ORDER — SODIUM CHLORIDE, SODIUM LACTATE, POTASSIUM CHLORIDE, CALCIUM CHLORIDE 600; 310; 30; 20 MG/100ML; MG/100ML; MG/100ML; MG/100ML
INJECTION, SOLUTION INTRAVENOUS CONTINUOUS
Status: CANCELLED | OUTPATIENT
Start: 2017-05-25

## 2017-05-25 RX ORDER — PROCHLORPERAZINE MALEATE 5 MG
5 TABLET ORAL EVERY 6 HOURS PRN
Status: DISCONTINUED | OUTPATIENT
Start: 2017-05-25 | End: 2017-06-29 | Stop reason: HOSPADM

## 2017-05-25 RX ORDER — CHOLESTYRAMINE 4 G/9G
1 POWDER, FOR SUSPENSION ORAL DAILY
Status: DISCONTINUED | OUTPATIENT
Start: 2017-05-26 | End: 2017-06-16

## 2017-05-25 RX ORDER — DIPHENOXYLATE HCL/ATROPINE 2.5-.025/5
5 LIQUID (ML) ORAL 4 TIMES DAILY
Status: DISCONTINUED | OUTPATIENT
Start: 2017-05-25 | End: 2017-05-26

## 2017-05-25 RX ORDER — GABAPENTIN 250 MG/5ML
300 SOLUTION ORAL EVERY 8 HOURS
Status: DISCONTINUED | OUTPATIENT
Start: 2017-05-25 | End: 2017-06-13

## 2017-05-25 RX ORDER — TRAZODONE HYDROCHLORIDE 50 MG/1
50-100 TABLET, FILM COATED ORAL AT BEDTIME
Status: DISCONTINUED | OUTPATIENT
Start: 2017-05-25 | End: 2017-05-27

## 2017-05-25 RX ORDER — LOPERAMIDE HYDROCHLORIDE 1 MG/5ML
4 SOLUTION ORAL 4 TIMES DAILY
Status: DISCONTINUED | OUTPATIENT
Start: 2017-05-25 | End: 2017-06-29 | Stop reason: HOSPADM

## 2017-05-25 RX ORDER — DEXMEDETOMIDINE HYDROCHLORIDE 4 UG/ML
0.2-1.2 INJECTION, SOLUTION INTRAVENOUS CONTINUOUS
Status: DISCONTINUED | OUTPATIENT
Start: 2017-05-25 | End: 2017-05-25 | Stop reason: HOSPADM

## 2017-05-25 RX ORDER — ONDANSETRON 4 MG/1
4 TABLET, ORALLY DISINTEGRATING ORAL EVERY 30 MIN PRN
Status: CANCELLED | OUTPATIENT
Start: 2017-05-25

## 2017-05-25 RX ORDER — MORPHINE 10 MG/ML
0.6 TINCTURE ORAL EVERY 6 HOURS
Status: DISCONTINUED | OUTPATIENT
Start: 2017-05-25 | End: 2017-06-16

## 2017-05-25 RX ORDER — LIDOCAINE 40 MG/G
CREAM TOPICAL
Status: DISCONTINUED | OUTPATIENT
Start: 2017-05-25 | End: 2017-05-25 | Stop reason: HOSPADM

## 2017-05-25 RX ORDER — MULTIPLE VITAMINS W/ MINERALS TAB 9MG-400MCG
1 TAB ORAL DAILY
Status: DISCONTINUED | OUTPATIENT
Start: 2017-05-26 | End: 2017-05-29

## 2017-05-25 RX ORDER — FENTANYL CITRATE 50 UG/ML
25-50 INJECTION, SOLUTION INTRAMUSCULAR; INTRAVENOUS
Status: CANCELLED | OUTPATIENT
Start: 2017-05-25

## 2017-05-25 RX ORDER — HYDROXYZINE HYDROCHLORIDE 10 MG/1
10 TABLET, FILM COATED ORAL 3 TIMES DAILY PRN
Status: DISCONTINUED | OUTPATIENT
Start: 2017-05-25 | End: 2017-06-07

## 2017-05-25 RX ORDER — ONDANSETRON 2 MG/ML
4 INJECTION INTRAMUSCULAR; INTRAVENOUS EVERY 30 MIN PRN
Status: CANCELLED | OUTPATIENT
Start: 2017-05-25

## 2017-05-25 RX ORDER — FENTANYL CITRATE 50 UG/ML
INJECTION, SOLUTION INTRAMUSCULAR; INTRAVENOUS PRN
Status: DISCONTINUED | OUTPATIENT
Start: 2017-05-25 | End: 2017-05-25

## 2017-05-25 RX ORDER — PROPOFOL 10 MG/ML
INJECTION, EMULSION INTRAVENOUS PRN
Status: DISCONTINUED | OUTPATIENT
Start: 2017-05-25 | End: 2017-05-25

## 2017-05-25 RX ORDER — PROCHLORPERAZINE 25 MG
12.5 SUPPOSITORY, RECTAL RECTAL EVERY 12 HOURS PRN
Status: DISCONTINUED | OUTPATIENT
Start: 2017-05-25 | End: 2017-06-29 | Stop reason: HOSPADM

## 2017-05-25 RX ADMIN — PIPERACILLIN SODIUM,TAZOBACTAM SODIUM 3.38 G: 3; .375 INJECTION, POWDER, FOR SOLUTION INTRAVENOUS at 18:41

## 2017-05-25 RX ADMIN — DEXMEDETOMIDINE HYDROCHLORIDE 0.7 MCG/KG/HR: 4 INJECTION, SOLUTION INTRAVENOUS at 07:39

## 2017-05-25 RX ADMIN — LOPERAMIDE HYDROCHLORIDE 4 MG: 1 SOLUTION ORAL at 12:53

## 2017-05-25 RX ADMIN — LIDOCAINE 2 PATCH: 50 PATCH TOPICAL at 08:00

## 2017-05-25 RX ADMIN — SODIUM CHLORIDE, PRESERVATIVE FREE 5 ML: 5 INJECTION INTRAVENOUS at 11:40

## 2017-05-25 RX ADMIN — HYDROMORPHONE HYDROCHLORIDE 0.5 MG: 1 INJECTION, SOLUTION INTRAMUSCULAR; INTRAVENOUS; SUBCUTANEOUS at 14:14

## 2017-05-25 RX ADMIN — GABAPENTIN 300 MG: 250 SOLUTION ORAL at 12:54

## 2017-05-25 RX ADMIN — PROPOFOL 20 MG: 10 INJECTION, EMULSION INTRAVENOUS at 07:55

## 2017-05-25 RX ADMIN — OXYCODONE HYDROCHLORIDE 15 MG: 5 SOLUTION ORAL at 11:33

## 2017-05-25 RX ADMIN — SODIUM CHLORIDE, POTASSIUM CHLORIDE, SODIUM LACTATE AND CALCIUM CHLORIDE: 600; 310; 30; 20 INJECTION, SOLUTION INTRAVENOUS at 07:28

## 2017-05-25 RX ADMIN — OXYCODONE HYDROCHLORIDE 15 MG: 5 SOLUTION ORAL at 16:46

## 2017-05-25 RX ADMIN — HYDROMORPHONE HYDROCHLORIDE 0.5 MG: 1 INJECTION, SOLUTION INTRAMUSCULAR; INTRAVENOUS; SUBCUTANEOUS at 05:31

## 2017-05-25 RX ADMIN — FLUCONAZOLE 200 MG: 2 INJECTION INTRAVENOUS at 20:41

## 2017-05-25 RX ADMIN — PANTOPRAZOLE SODIUM 40 MG: 40 TABLET, DELAYED RELEASE ORAL at 08:00

## 2017-05-25 RX ADMIN — LOPERAMIDE HYDROCHLORIDE 4 MG: 1 SOLUTION ORAL at 20:45

## 2017-05-25 RX ADMIN — ACETAMINOPHEN 975 MG: 160 SUSPENSION ORAL at 20:47

## 2017-05-25 RX ADMIN — PIPERACILLIN SODIUM,TAZOBACTAM SODIUM 3.38 G: 3; .375 INJECTION, POWDER, FOR SOLUTION INTRAVENOUS at 00:05

## 2017-05-25 RX ADMIN — Medication 5 ML: at 20:46

## 2017-05-25 RX ADMIN — METOPROLOL TARTRATE 25 MG: 100 TABLET ORAL at 20:46

## 2017-05-25 RX ADMIN — HEPARIN SODIUM 5000 UNITS: 5000 INJECTION, SOLUTION INTRAVENOUS; SUBCUTANEOUS at 20:45

## 2017-05-25 RX ADMIN — HYDROMORPHONE HYDROCHLORIDE 0.5 MG: 1 INJECTION, SOLUTION INTRAMUSCULAR; INTRAVENOUS; SUBCUTANEOUS at 18:41

## 2017-05-25 RX ADMIN — Medication 6 MG: at 03:03

## 2017-05-25 RX ADMIN — OXYCODONE HYDROCHLORIDE 15 MG: 5 SOLUTION ORAL at 03:06

## 2017-05-25 RX ADMIN — PROPOFOL 50 MG: 10 INJECTION, EMULSION INTRAVENOUS at 07:47

## 2017-05-25 RX ADMIN — ACETAMINOPHEN 975 MG: 325 SOLUTION ORAL at 05:11

## 2017-05-25 RX ADMIN — Medication 5 ML: at 16:46

## 2017-05-25 RX ADMIN — HEPARIN SODIUM 5000 UNITS: 5000 INJECTION, SOLUTION INTRAVENOUS; SUBCUTANEOUS at 12:53

## 2017-05-25 RX ADMIN — GABAPENTIN 300 MG: 250 SOLUTION ORAL at 05:11

## 2017-05-25 RX ADMIN — ACETAMINOPHEN 975 MG: 160 SUSPENSION ORAL at 14:15

## 2017-05-25 RX ADMIN — HEPARIN SODIUM 5000 UNITS: 5000 INJECTION, SOLUTION INTRAVENOUS; SUBCUTANEOUS at 05:11

## 2017-05-25 RX ADMIN — GABAPENTIN 300 MG: 250 SOLUTION ORAL at 20:46

## 2017-05-25 RX ADMIN — MINERAL SUPPLEMENT IRON 300 MG / 5 ML STRENGTH LIQUID 100 PER BOX UNFLAVORED 300 MG: at 12:45

## 2017-05-25 RX ADMIN — PIPERACILLIN SODIUM,TAZOBACTAM SODIUM 3.38 G: 3; .375 INJECTION, POWDER, FOR SOLUTION INTRAVENOUS at 12:47

## 2017-05-25 RX ADMIN — Medication 1 PACKET: at 14:15

## 2017-05-25 RX ADMIN — OXYCODONE HYDROCHLORIDE 15 MG: 5 SOLUTION ORAL at 00:06

## 2017-05-25 RX ADMIN — FENTANYL CITRATE 25 MCG: 50 INJECTION, SOLUTION INTRAMUSCULAR; INTRAVENOUS at 07:43

## 2017-05-25 RX ADMIN — TRAZODONE HYDROCHLORIDE 100 MG: 50 TABLET ORAL at 21:50

## 2017-05-25 RX ADMIN — MIDAZOLAM HYDROCHLORIDE 1 MG: 1 INJECTION, SOLUTION INTRAMUSCULAR; INTRAVENOUS at 07:28

## 2017-05-25 RX ADMIN — LOPERAMIDE HYDROCHLORIDE 4 MG: 1 SOLUTION ORAL at 16:46

## 2017-05-25 RX ADMIN — Medication 6 MG: at 21:49

## 2017-05-25 RX ADMIN — Medication 6 MG: at 14:15

## 2017-05-25 RX ADMIN — Medication 1 PACKET: at 20:48

## 2017-05-25 RX ADMIN — OXYCODONE HYDROCHLORIDE 15 MG: 5 SOLUTION ORAL at 20:45

## 2017-05-25 ASSESSMENT — LIFESTYLE VARIABLES: TOBACCO_USE: 1

## 2017-05-25 NOTE — PROGRESS NOTES
Thoracic surgery progress note    No acute events overnight. Tolerated dressing change yesterday. Pain controlled.     Temp: 95.8  F (35.4  C) Temp  Min: 95.3  F (35.2  C)  Max: 98.6  F (37  C)  Resp: 16 Resp  Min: 12  Max: 20  SpO2: 100 % SpO2  Min: 96 %  Max: 100 %  Pulse: 66 Pulse  Min: 66  Max: 66  Heart Rate: 63 Heart Rate  Min: 56  Max: 74  BP: 126/56 Systolic (24hrs), Av , Min:110 , Max:134   Diastolic (24hrs), Av, Min:48, Max:68    A&O, NAD  Unlabored breathing on RA  Dressing c/d/i  Abd soft, non tender  Mediastinal tube with scant drainage to gravity    I&O  UOP 1050/500  CT 75/0    Labs  Hgn 7.2 <- 7.0  WBC 7.7 <- 10.6    72F w/ AFib on Coumadin and chronic esophageal perforation and mediastinal abscess cavity s/p gastric pullup c/b esophageal leak and mediastinal abscess s/p serial washouts    - NPO. C/w tube feeds. Anti-motility agents  - Daily OR washouts under MAC as a bridge to VAC therapy  - C/w Zosyn/Fluc  - Heparin SQ    Seen w/ team    Kd Liu MD  Surgery PGY1  [phone]

## 2017-05-25 NOTE — ANESTHESIA PREPROCEDURE EVALUATION
Anesthesia Evaluation     . Pt has had prior anesthetic. Type: General and MAC    No history of anesthetic complications          ROS/MED HX    ENT/Pulmonary:     (+)tobacco use, Current use 1 packs/day  , . .    Neurologic: Comment: Left pinpoint pupil associated with MS    (+)Multiple Sclerosis limitations: heat and noise sensitivity,     Cardiovascular:     (+) ----. : . . . :. . Previous cardiac testing Echodate:1/9/2017results:Technically difficult study.  Global and regional left ventricular function is hyperdynamic with an LVEF  >70%.  The right ventricular function is hyperdynamic.  Mild pulmonary hypertension is present.  The inferior vena cava is normal. The estimated mean right atrial pressure is  <3 mmHg.  No pericardial effusion is present.  Previous study not available for comparison.date: results:ECG reviewed date:2/22/2017 results:Sinus rizwan with left atrial enlargement date: results:          METS/Exercise Tolerance:  3 - Able to walk 1-2 blocks without stopping   Hematologic:     (+) Anemia, History of Transfusion no previous transfusion reaction -     (-) history of blood clots   Musculoskeletal: Comment: Fibromyalgia - generalized body aches  (+) arthritis, , , -       GI/Hepatic: Comment: History of hiatal hernia w/ associated GERD now s/p surgery.  GERD resolved    S/p esophagectomy    (+) GERD (asymptomatic currently, stopped protonix 3 months ago) Other, hiatal hernia, Inflammatory bowel disease, Other GI/Hepatic chronic esophageal perforation      Renal/Genitourinary:  - ROS Renal section negative       Endo:  - neg endo ROS       Psychiatric:  - neg psychiatric ROS       Infectious Disease:  - neg infectious disease ROS       Malignancy:   (+) Malignancy History of Breast  Breast CA Remission status post Surgery, Chemo and Radiation.         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  No vision impairment now.   (+) No chance of pregnancy  C-spine cleared: N/A, H/O Chronic Pain,H/O chronic opiod use , no other significant disability                    Physical Exam  Normal systems: cardiovascular, pulmonary and dental    Airway   Mallampati: I  TM distance: >3 FB  Neck ROM: full    Dental     Cardiovascular       Pulmonary                     Anesthesia Plan      History & Physical Review  History and physical reviewed and following examination; no interval change.    ASA Status:  3 .        Plan for MAC with Intravenous induction. Maintenance will be TIVA.  Reason for MAC:  Deep or markedly invasive procedure (G8)  PONV prophylaxis:  Ondansetron (or other 5HT-3)  Propofol induction and precedex infusion      Postoperative Care  Postoperative pain management:  IV analgesics.      Consents  Anesthetic plan, risks, benefits and alternatives discussed with:  Patient.  Use of blood products discussed: Yes.   Consented to blood products.  .        ANESTHESIA PREOP EVALUATION    PROCEDURE: Procedure(s):  Irrigation and Debridement Chest Washout  - Wound Class: I-Clean    HPI: Minerva Blanco is a 72 year old female with chronic esophageal perforation after hiatal hernia repair in 2016, now s/p esophagectomy with substernal abscess who presents for the above procedure.     PAST MEDICAL HISTORY:  Past Medical History:   Diagnosis Date     Atrial fibrillation (H)      Breast cancer (H) 2007    right side - lumpectomy, chemo, and local radiation     Esophageal perforation      Fibromyalgia      GERD (gastroesophageal reflux disease)      Hiatal hernia      History of blood transfusion      IBS (irritable bowel syndrome)      Meniere's disease     deaf left ear     MS (multiple sclerosis) (H)        PAST SURGICAL HISTORY:  Past Surgical History:   Procedure Laterality Date     APPENDECTOMY       BACK SURGERY  5/1/2015    lumbar fusion     BRONCHOSCOPY FLEXIBLE N/A 4/17/2017    Procedure: BRONCHOSCOPY FLEXIBLE;;  Surgeon: Jens Wise MD;   [facility]     C GASTROSTOMY/JEJUN TUBE      J tube for feedings     CLOSE SPIT FISTULA N/A 4/17/2017    Procedure: CLOSE SPIT FISTULA;;  Surgeon: Jens Wise MD;  [facility]     COMBINED ESOPHAGOSCOPY, GASTROSCOPY, DUODENOSCOPY (EGD), REMOVE ESOPHAGEAL STENT N/A 8/26/2016    Procedure: COMBINED ESOPHAGOSCOPY, GASTROSCOPY, DUODENOSCOPY (EGD), REMOVE ESOPHAGEAL STENT;  Surgeon: Jens Wise MD;  [facility]     CREATE SPIT FISTULA N/A 1/9/2017    Procedure: CREATE SPIT FISTULA;  Surgeon: Jens Wise MD;  [facility]     ESOPHAGECTOMY N/A 1/9/2017    Procedure: ESOPHAGECTOMY;  Surgeon: Jens Wise MD;  [facility]     ESOPHAGOSCOPY, GASTROSCOPY, DUODENOSCOPY (EGD), COMBINED N/A 4/18/2016    Procedure: COMBINED ESOPHAGOSCOPY, GASTROSCOPY, DUODENOSCOPY (EGD);  Surgeon: Alexis Barraza MD;  [facility]     ESOPHAGOSCOPY, GASTROSCOPY, DUODENOSCOPY (EGD), COMBINED N/A 4/25/2016    Procedure: COMBINED ESOPHAGOSCOPY, GASTROSCOPY, DUODENOSCOPY (EGD);  Surgeon: Alexis Barraza MD;  [facility]     ESOPHAGOSCOPY, GASTROSCOPY, DUODENOSCOPY (EGD), COMBINED N/A 5/4/2016    Procedure: COMBINED ESOPHAGOSCOPY, GASTROSCOPY, DUODENOSCOPY (EGD);  Surgeon: Alexis Barraza MD;  [facility]     ESOPHAGOSCOPY, GASTROSCOPY, DUODENOSCOPY (EGD), COMBINED N/A 5/18/2016    Procedure: COMBINED ESOPHAGOSCOPY, GASTROSCOPY, DUODENOSCOPY (EGD);  Surgeon: Alexis Barraza MD;  [facility]     ESOPHAGOSCOPY, GASTROSCOPY, DUODENOSCOPY (EGD), COMBINED N/A 6/22/2016    Procedure: COMBINED ESOPHAGOSCOPY, GASTROSCOPY, DUODENOSCOPY (EGD);  Surgeon: Alexis Barraza MD;  [facility]     ESOPHAGOSCOPY, GASTROSCOPY, DUODENOSCOPY (EGD), COMBINED N/A 7/12/2016    Procedure: COMBINED ESOPHAGOSCOPY, GASTROSCOPY, DUODENOSCOPY (EGD);  Surgeon: Jens Wise MD;  [facility]     ESOPHAGOSCOPY,  GASTROSCOPY, DUODENOSCOPY (EGD), COMBINED N/A 4/21/2017    Procedure: COMBINED ESOPHAGOSCOPY, GASTROSCOPY, DUODENOSCOPY (EGD);  Esophagogastroduodenoscopy, Pharyngostomy Tube Placement ;  Surgeon: Jens Wise MD;  [facility]     ESOPHAGOSCOPY, GASTROSCOPY, DUODENOSCOPY (EGD), COMBINED N/A 5/19/2017    Procedure: COMBINED ESOPHAGOSCOPY, GASTROSCOPY, DUODENOSCOPY (EGD);  Upper Endoscopy, Irrigation and Debridement of Chest Wound ;  Surgeon: Nalini Barreto MD;  [facility]     ESOPHAGOSCOPY, GASTROSCOPY, DUODENOSCOPY (EGD), COMBINED N/A 5/23/2017    Procedure: COMBINED ESOPHAGOSCOPY, GASTROSCOPY, DUODENOSCOPY (EGD);;  Surgeon: Nalini Barreto MD;  [facility]     ESOPHAGOSCOPY, GASTROSCOPY, DUODENOSCOPY (EGD), DILATATION, COMBINED N/A 6/29/2016    Procedure: COMBINED ESOPHAGOSCOPY, GASTROSCOPY, DUODENOSCOPY (EGD), DILATATION;  Surgeon: Jens Wise MD;  [facility]     EXPLORE NECK N/A 5/19/2017    Procedure: EXPLORE NECK;;  Surgeon: Nalini Barreto MD;  [facility]     HC UGI ENDOSCOPY W TRANSENDOSCOPIC STENT PLACEMENT N/A 7/12/2016    Procedure: COMBINED ESOPHAGOSCOPY, GASTROSCOPY, DUODENOSCOPY (EGD), PLACE TRANSENDOSCOPIC ESOPHAGEAL STENT;  Surgeon: Jens Wise MD;  [facility]     HC UGI ENDOSCOPY W TRANSENDOSCOPIC STENT PLACEMENT N/A 7/22/2016    Procedure: COMBINED ESOPHAGOSCOPY, GASTROSCOPY, DUODENOSCOPY (EGD), PLACE TRANSENDOSCOPIC ESOPHAGEAL STENT;  Surgeon: Jens Wise MD;  [facility]     IRRIGATION AND DEBRIDEMENT CHEST WASHOUT, COMBINED N/A 6/29/2016    Procedure: COMBINED IRRIGATION AND DEBRIDEMENT CHEST WASHOUT;  Surgeon: Jens Wise MD;  [facility]     IRRIGATION AND DEBRIDEMENT CHEST WASHOUT, COMBINED N/A 5/23/2017    Procedure: COMBINED IRRIGATION AND DEBRIDEMENT CHEST WASHOUT;  COMBINED IRRIGATION AND DEBRIDEMENT CHEST WASHOUT,COMBINED ESOPHAGOSCOPY, GASTROSCOPY, DUODENOSCOPY (EGD) ;  Surgeon: Estevan  "Nalini NAVARRO MD;  [facility]     LAPAROSCOPIC ASSISTED INSERTION TUBE JEJUNOSTOMY N/A 4/17/2017    Procedure: LAPAROSCOPIC ASSISTED INSERTION TUBE JEJUNOSTOMY;;  Surgeon: Jens Wise MD;  [facility]     LAPAROSCOPY DIAGNOSTIC (GENERAL) N/A 1/9/2017    Procedure: LAPAROSCOPY DIAGNOSTIC (GENERAL);  Surgeon: Jens Wise MD;  [facility]     LAPAROSCOPY DIAGNOSTIC (GENERAL) N/A 4/17/2017    Procedure: LAPAROSCOPY DIAGNOSTIC (GENERAL);  Laparoscopic , Neck Dissection, Spit Fistula Takedown, Laparoscopic Jejunostomy Tube and Pharyngostomy Tube, Gastric Pull up, Upper Endoscopy(EGD)  , Flexible Bronchoscopy ,Sternotomy ;  Surgeon: Jens Wise MD;  [facility]     LUMPECTOMY BREAST Right 2007     NERVE BLOCK PERIPHERAL N/A 8/30/2016    Procedure: NERVE BLOCK PERIPHERAL;  Surgeon: GENERIC ANESTHESIA PROVIDER;  [facility]     NISSEN FUNDOPLICATION  1/2016     PHARYNGOSTOMY N/A 4/18/2016    Procedure: PHARYNGOSTOMY;  Surgeon: Alexis Barraza MD;  [facility]     PHARYNGOSTOMY N/A 4/25/2016    Procedure: PHARYNGOSTOMY;  Surgeon: Alexis Barraza MD;  [facility]     PHARYNGOSTOMY N/A 5/4/2016    Procedure: PHARYNGOSTOMY;  Surgeon: Alexis Barraza MD;  [facility]     PHARYNGOSTOMY N/A 6/22/2016    Procedure: PHARYNGOSTOMY;  Surgeon: Alexis Barraza MD;  [facility]     PHARYNGOSTOMY N/A 6/29/2016    Procedure: PHARYNGOSTOMY;  Surgeon: Jens Wise MD;  [facility]     PHARYNGOSTOMY N/A 4/21/2017    Procedure: PHARYNGOSTOMY;;  Surgeon: Jens Wise MD;  [facility]     PICC INSERTION Left 8/25/2016    5fr DL BioFlo PICC, 42cm (3cm external) in the L medial brachial vein w/ tip in the SVC RA junction.     THORACOTOMY Right 1998    lung infection - \"hard crust formed on lung\"     THORACOTOMY Left 1/9/2017    Procedure: THORACOTOMY;  Surgeon: Jens Wise MD;  " [facility]     WRIST SURGERY         SOCIAL HISTORY:   Social History   Substance Use Topics     Smoking status: Former Smoker     Packs/day: 1.00     Years: 15.00     Types: Cigarettes     Quit date: 1/1/1998     Smokeless tobacco: Not on file     Alcohol use No       ALLERGIES:   Allergies   Allergen Reactions     Amoxicillin Diarrhea     Ativan [Lorazepam] Other (See Comments)     Hallucinations     Hydromorphone Itching     4/12/17 - patient open to using this as she tolerated Hydromorphone PCA during hospitalization in January 2017.      Morphine Itching       MEDICATIONS:  Prescriptions Prior to Admission   Medication Sig Dispense Refill Last Dose     oxyCODONE (ROXICODONE) 10 MG IR tablet Take 1 to 1.5 tablet every 3-4 hours PRN pain 60 tablet 0 5/18/2017 at 1200     diphenoxylate-atropine (LOMOTIL) 0.5-0.005 mg/mL liquid Take 5 mLs by mouth 4 times daily as needed for diarrhea 300 mL 0 5/18/2017 at 0800     fiber modular (NUTRISOURCE FIBER) packet 1 packet by Per Feeding Tube route 3 times daily (with meals) 60 packet 0 Unknown at Unknown time     metoprolol (LOPRESSOR) 10 mg/mL SUSP 2.5 mLs (25 mg) by Per J Tube route 2 times daily   5/18/2017 at 0800     opium tincture 10 mg/mL (1%) liquid Take 0.6 mLs (6 mg) by mouth every 6 hours 118 mL 0 5/18/2017 at 0800     ferrous sulfate 300 (60 FE) MG/5ML syrup 5 mLs (300 mg) by Per J Tube route daily 150 mL 0 5/17/2017 at 2000     gabapentin (NEURONTIN) 250 MG/5ML solution Take 6 mLs (300 mg) by mouth every 8 hours 450 mL 0 5/18/2017 at 0800     traZODone (DESYREL) 100 MG tablet 0.5 tablets (50 mg) by Per G Tube route At Bedtime (Patient taking differently: 100 mg by Per G Tube route At Bedtime ) 30 tablet 0 5/18/2017 at 2200     simethicone (MYLICON) 40 MG/0.6ML suspension 0.6 mLs (40 mg) by Per Feeding Tube route every 6 hours as needed for cramping 30 mL 0 5/18/2017 at 0800     ranitidine (ZANTAC) 150 MG/10ML syrup Take 10 mLs (150 mg) by mouth 2 times  daily 600 mL 0 5/18/2017 at 0800     warfarin (COUMADIN) 2.5 MG tablet Take 1 tablet (2.5 mg) by mouth daily (Patient not taking: Reported on 5/18/2017) 30 tablet 1 Unknown at Unknown time     order for DME Equipment being ordered:   [facility] Suction Machine-Intermittent  Suction Canisters(2)  Suction Canister Holders(2)  Suction Connect Tube(2)  5 in 1 Connector(2)  Bacteria Filter(2)  Yaunkauer Suction(2)    Treatment Diagnosis: Esophogeal Perforation, s/p esophagectomy 1 Device 0 Taking     order for DME Equipment being ordered:   Suction Pump  Suction Canister(2)  Suction Tubing(2)  Bacteria Filter(2)  Yaunkauer(4)  Red Rubber Catheter(2)    Treatment Diagnosis: Esophogeal Perforation, s/p esophagectomy 1 Device 0 Taking     DiphenhydrAMINE HCl (BENADRYL PO) Take 25 mg by mouth nightly as needed   5/17/2017 at 2000     cholestyramine (QUESTRAN) 4 G Packet Take 1 packet by mouth daily   5/18/2017 at 0800     multivitamins with minerals (CERTAVITE/CEROVITE) LIQD liquid Take 15 mLs by mouth daily   5/17/2017 at 0800     loperamide (IMODIUM) 1 MG/5ML liquid Take 20 mLs (4 mg) by mouth 4 times daily as needed for diarrhea (Patient taking differently: Take 4 mg by mouth 2 times daily ) 200 mL  5/18/2017 at 0800     Lactobacillus Rhamnosus, GG, (CULTURELLE PO) Take 1 tablet by mouth 2 times daily    5/18/2017 at 0800       NPO STATUS: NPO after midnight.  LABS:      BMP:  Recent Labs   Lab Test  05/24/17   0809   NA  139   POTASSIUM  3.6   CHLORIDE  106   CO2  24   BUN  3*   CR  0.46*   GLC  70   ANNABELLE  7.4*       LFTs:   Recent Labs   Lab Test  04/17/17   0653   PROTTOTAL  8.6   ALBUMIN  3.6   BILITOTAL  0.8   ALKPHOS  283*   AST  38   ALT  56*       CBC:   Recent Labs   Lab Test  05/25/17   0641   WBC  7.7   RBC  2.60*   HGB  7.2*   HCT  23.1*   MCV  89   MCH  27.7   MCHC  31.2*   RDW  16.0*   PLT  474*       Coags:  Recent Labs   Lab Test  05/20/17   0334  05/19/17   1650   INR  1.33*  1.87*   PTT   --   54*   FIBR   --    497*       ASSESSMENT & PLAN:  - ASA 3  - MAC with standard ASA monitors, IV induction, and balanced anesthetic  - PICC present   - Antibiotics per surgery  - PONV prophylaxis  - Blood products available, possible administration discussed with patient  - Relevant risks, benefits, alternatives and the anesthetic plan was discussed.  All questions were answered and there was agreement to proceed.      France Gee CA1  pager [phone]

## 2017-05-25 NOTE — OP NOTE
DATE OF PROCEDURE:  2017      PREOPERATIVE DIAGNOSIS:  Open chest wound      POSTOPERATIVE DIAGNOSIS:  Open chest wound.      PROCEDURE PERFORMED:  Washout and dressing change of open chest wound.      SURGEON:  Conchis Marrero MD      ASSISTANT SURGEON: Viki Carmen MD      DESCRIPTION OF PROCEDURE:  After obtaining informed consent, the patient was brought to the operating room and laid supine on the operating table.  We had her with her back up for aspiration precautions with deep sedation and monitored anesthesia care the packing was removed from the chest wound.  There was still significant purulence however, the cavity itself was quite clean.  This was washed out with warm saline solution and was repacked.  The patient tolerated the procedure well.            CONCHIS MARRERO MD             D: 2017 17:55   T: 2017 11:08   MT: WILMER      Name:     FAVIAN MALONE   MRN:      [id number]        Account:        [id number]   :      1945           Procedure Date: 2017      Document: [id number]

## 2017-05-25 NOTE — PROGRESS NOTES
Surgery Post-Op Check  5/24/2017 11:32 PM     No acute events postoperatively. Pain well controlled. Still with frequent BMs. Voiding independently. No other complaints.      /48 (BP Location: Right arm)  Pulse 66  Temp 95.3  F (35.2  C) (Oral)  Resp 18  Ht 1.524 m (5')  Wt 49.2 kg (108 lb 6.4 oz)  SpO2 97%  BMI 21.17 kg/m2    NAD, AOx3  RRR, NLB on RA  Chest wound dressing C/D/I  WWP    UOP: adequate      A/P:  72 year old female POD 0 s/p chest wound washout, doing well postoperatively.     - Continue pain control  - Otherwise cares per primary team    Blas Dominguez MD  PGY-1 General Surgery  [facility]

## 2017-05-25 NOTE — PLAN OF CARE
Problem: Goal Outcome Summary  Goal: Goal Outcome Summary  OT/7B - Pt in OR this AM and unavailable for therapy. Will check back and/or reschedule as able.

## 2017-05-25 NOTE — ANESTHESIA CARE TRANSFER NOTE
Patient: Minerva Blanco    Procedure(s):  Irrigation and Debridement Chest Washout and dressing change - Wound Class: III-Contaminated    Diagnosis: Non healing wound   Diagnosis Additional Information: No value filed.    Anesthesia Type:   MAC     Note:  Airway :Face Mask  Patient transferred to:Medical/Surgical Unit  Comments: VS, breathing spontaneously, nonlabored, SpO2 100% on 2 L simple facemask, sleepy but awakes to verbal stimulus and answers questions appropriately. Transported to  by transporter. Report given to floor RN.      Vitals: (Last set prior to Anesthesia Care Transfer)    CRNA VITALS  5/25/2017 0742 - 5/25/2017 0815      5/25/2017             Pulse: 56    SpO2: 100 %    Resp Rate (set): 10                Electronically Signed By: France Gee MD  May 25, 2017  8:15 AM

## 2017-05-25 NOTE — PLAN OF CARE
Problem: Goal Outcome Summary  Goal: Goal Outcome Summary  Outcome: No Change  Pt returned from OR post abd washout around 0830. VSS. Taken off 2L O2 around 10am, oxygen saturations stable at 95%+ on RA. No capnography present.  Pt is on sternal precautions.  Commode at bedside. TPA given in purple lumen of left arm PICC.  Tube feeds restarted at 15ml/hour, goal of 45.  Pt is tolerating well and adjusts indpendently, knows goal.  Still experiencing loose stools. Oxycodone given for adequate pain relief.   at bedside this morning.

## 2017-05-25 NOTE — ANESTHESIA CARE TRANSFER NOTE
Patient: Minerva Blanco    Procedure(s):  Chest wound Washout and Dressing Change - Wound Class: I-Clean    Diagnosis: Non healing wound   Diagnosis Additional Information: No value filed.    Anesthesia Type:   MAC     Note:  Airway :Room Air  Patient transferred to:Medical/Surgical Unit  Comments: Report to called to 7B, report given to ROMAINE Lemus. Pt awake on RA. VSS, sats %. Ventilating well. Pt states anesthetic went well. Orderly paged and Pt tx'd back to 7B.      Vitals: (Last set prior to Anesthesia Care Transfer)    CRNA VITALS  5/24/2017 1735 - 5/24/2017 1835      5/24/2017             Resp Rate (set): 10                Electronically Signed By: CLIVE Jarvis CRNA  May 24, 2017  7:55 PM

## 2017-05-25 NOTE — PHARMACY-CONSULT NOTE
Pharmacy Tube Feeding Consult    Medication reviewed for administration by feeding tube and for potential food/drug interactions.    Recommendation: No changes are needed at this time.     Pharmacy will continue to follow as new medications are ordered.    Mandy NailsD

## 2017-05-25 NOTE — PLAN OF CARE
Problem: Goal Outcome Summary  Goal: Goal Outcome Summary  Vital signs:  Temp: 95.3  F (35.2  C) Temp src: Oral BP: 128/63 Pulse: 66 Heart Rate: 68 Resp: 16 SpO2: 98 % O2 Device: None (Room air)   VSS. Chest wound covered with dressing shadowing on dressing from previous shift, no new drainage. Chest tube to ojeda bag, no output during this shift. J tube connected to continuous TF's at 15 mL/hr until 0200 when pt shut off TF's D/T diarrhea. Pt disconnected from TF's to go down to procedure will need to be reconnected when she returns. Double lumen PICC on L arm, purple lumen will need alteplase when she returns. C/O chest pain near wound, oxycodone x 2 administered through J tube and IV Dilaudid x 1 along with scheduled meds for pain control. Up to the commode independently, loose BM x 2, voiding adequate amounts. BG check before going down to OR 93. OR today for washout, taken down at 0600. Sleeping between cares during the night.

## 2017-05-25 NOTE — PLAN OF CARE
Problem: Goal Outcome Summary  Goal: Goal Outcome Summary  Outcome: No Change  Returned from OR @ 1800. Pt sleepy but easily aroused. VSS. Had watery inc stool in bed. Pt more awake by 2030 and up using commode. Tube feeding started @ 15cc/hr @ 1900. Pt requested to have feeding increased to 25cc/hr @ 2030. Had large watery stool @ 2130. Chest dressing dry and intact with shadow of drainage. Medicated for pain x1. Able to make needs known.

## 2017-05-26 ENCOUNTER — ANESTHESIA (OUTPATIENT)
Dept: SURGERY | Facility: CLINIC | Age: 72
DRG: 856 | End: 2017-05-26
Payer: MEDICARE

## 2017-05-26 LAB
ANION GAP SERPL CALCULATED.3IONS-SCNC: 8 MMOL/L (ref 3–14)
BASOPHILS # BLD AUTO: 0 10E9/L (ref 0–0.2)
BASOPHILS NFR BLD AUTO: 0.3 %
BUN SERPL-MCNC: 2 MG/DL (ref 7–30)
CALCIUM SERPL-MCNC: 7.9 MG/DL (ref 8.5–10.1)
CHLORIDE SERPL-SCNC: 112 MMOL/L (ref 94–109)
CO2 SERPL-SCNC: 24 MMOL/L (ref 20–32)
CREAT SERPL-MCNC: 0.36 MG/DL (ref 0.52–1.04)
DIFFERENTIAL METHOD BLD: ABNORMAL
EOSINOPHIL # BLD AUTO: 0.4 10E9/L (ref 0–0.7)
EOSINOPHIL NFR BLD AUTO: 3.4 %
ERYTHROCYTE [DISTWIDTH] IN BLOOD BY AUTOMATED COUNT: 16.4 % (ref 10–15)
GFR SERPL CREATININE-BSD FRML MDRD: ABNORMAL ML/MIN/1.7M2
GLUCOSE SERPL-MCNC: 78 MG/DL (ref 70–99)
HCT VFR BLD AUTO: 23.8 % (ref 35–47)
HGB BLD-MCNC: 7 G/DL (ref 11.7–15.7)
IMM GRANULOCYTES # BLD: 0 10E9/L (ref 0–0.4)
IMM GRANULOCYTES NFR BLD: 0.4 %
LYMPHOCYTES # BLD AUTO: 1.2 10E9/L (ref 0.8–5.3)
LYMPHOCYTES NFR BLD AUTO: 11.9 %
MAGNESIUM SERPL-MCNC: 1.8 MG/DL (ref 1.6–2.3)
MCH RBC QN AUTO: 26.6 PG (ref 26.5–33)
MCHC RBC AUTO-ENTMCNC: 29.4 G/DL (ref 31.5–36.5)
MCV RBC AUTO: 91 FL (ref 78–100)
MONOCYTES # BLD AUTO: 0.9 10E9/L (ref 0–1.3)
MONOCYTES NFR BLD AUTO: 8.7 %
NEUTROPHILS # BLD AUTO: 7.8 10E9/L (ref 1.6–8.3)
NEUTROPHILS NFR BLD AUTO: 75.3 %
NRBC # BLD AUTO: 0 10*3/UL
NRBC BLD AUTO-RTO: 0 /100
PHOSPHATE SERPL-MCNC: 2.8 MG/DL (ref 2.5–4.5)
PLATELET # BLD AUTO: 554 10E9/L (ref 150–450)
POTASSIUM SERPL-SCNC: 2.9 MMOL/L (ref 3.4–5.3)
POTASSIUM SERPL-SCNC: 4.2 MMOL/L (ref 3.4–5.3)
RBC # BLD AUTO: 2.63 10E12/L (ref 3.8–5.2)
SODIUM SERPL-SCNC: 144 MMOL/L (ref 133–144)
WBC # BLD AUTO: 10.4 10E9/L (ref 4–11)

## 2017-05-26 PROCEDURE — 85025 COMPLETE CBC W/AUTO DIFF WBC: CPT | Performed by: SURGERY

## 2017-05-26 PROCEDURE — 36000057 ZZH SURGERY LEVEL 3 1ST 30 MIN - UMMC: Performed by: STUDENT IN AN ORGANIZED HEALTH CARE EDUCATION/TRAINING PROGRAM

## 2017-05-26 PROCEDURE — 84132 ASSAY OF SERUM POTASSIUM: CPT | Performed by: SURGERY

## 2017-05-26 PROCEDURE — 00000146 ZZHCL STATISTIC GLUCOSE BY METER IP

## 2017-05-26 PROCEDURE — 25000128 H RX IP 250 OP 636: Performed by: SURGERY

## 2017-05-26 PROCEDURE — 25000128 H RX IP 250 OP 636: Performed by: STUDENT IN AN ORGANIZED HEALTH CARE EDUCATION/TRAINING PROGRAM

## 2017-05-26 PROCEDURE — 25000132 ZZH RX MED GY IP 250 OP 250 PS 637: Mod: GY | Performed by: STUDENT IN AN ORGANIZED HEALTH CARE EDUCATION/TRAINING PROGRAM

## 2017-05-26 PROCEDURE — 25000125 ZZHC RX 250: Performed by: STUDENT IN AN ORGANIZED HEALTH CARE EDUCATION/TRAINING PROGRAM

## 2017-05-26 PROCEDURE — 27210429 ZZH NUTRITION PRODUCT INTERMEDIATE LITER

## 2017-05-26 PROCEDURE — A9270 NON-COVERED ITEM OR SERVICE: HCPCS | Mod: GY | Performed by: SURGERY

## 2017-05-26 PROCEDURE — 27210794 ZZH OR GENERAL SUPPLY STERILE: Performed by: STUDENT IN AN ORGANIZED HEALTH CARE EDUCATION/TRAINING PROGRAM

## 2017-05-26 PROCEDURE — 83735 ASSAY OF MAGNESIUM: CPT | Performed by: SURGERY

## 2017-05-26 PROCEDURE — 37000008 ZZH ANESTHESIA TECHNICAL FEE, 1ST 30 MIN: Performed by: STUDENT IN AN ORGANIZED HEALTH CARE EDUCATION/TRAINING PROGRAM

## 2017-05-26 PROCEDURE — 25000125 ZZHC RX 250: Performed by: ANESTHESIOLOGY

## 2017-05-26 PROCEDURE — A9270 NON-COVERED ITEM OR SERVICE: HCPCS | Mod: GY | Performed by: STUDENT IN AN ORGANIZED HEALTH CARE EDUCATION/TRAINING PROGRAM

## 2017-05-26 PROCEDURE — 25000125 ZZHC RX 250: Performed by: SURGERY

## 2017-05-26 PROCEDURE — 40000170 ZZH STATISTIC PRE-PROCEDURE ASSESSMENT II: Performed by: STUDENT IN AN ORGANIZED HEALTH CARE EDUCATION/TRAINING PROGRAM

## 2017-05-26 PROCEDURE — 12000003 ZZH R&B CRITICAL UMMC

## 2017-05-26 PROCEDURE — 99207 ZZC NO CHARGE SIGN-OFF PS: CPT

## 2017-05-26 PROCEDURE — 36592 COLLECT BLOOD FROM PICC: CPT | Performed by: SURGERY

## 2017-05-26 PROCEDURE — 25000128 H RX IP 250 OP 636: Performed by: THORACIC SURGERY (CARDIOTHORACIC VASCULAR SURGERY)

## 2017-05-26 PROCEDURE — 80048 BASIC METABOLIC PNL TOTAL CA: CPT | Performed by: SURGERY

## 2017-05-26 PROCEDURE — 25000132 ZZH RX MED GY IP 250 OP 250 PS 637: Mod: GY | Performed by: SURGERY

## 2017-05-26 PROCEDURE — 84100 ASSAY OF PHOSPHORUS: CPT | Performed by: SURGERY

## 2017-05-26 PROCEDURE — 25000128 H RX IP 250 OP 636: Performed by: ANESTHESIOLOGY

## 2017-05-26 PROCEDURE — 0JD60ZZ EXTRACTION OF CHEST SUBCUTANEOUS TISSUE AND FASCIA, OPEN APPROACH: ICD-10-PCS | Performed by: STUDENT IN AN ORGANIZED HEALTH CARE EDUCATION/TRAINING PROGRAM

## 2017-05-26 PROCEDURE — 36000059 ZZH SURGERY LEVEL 3 EA 15 ADDTL MIN UMMC: Performed by: STUDENT IN AN ORGANIZED HEALTH CARE EDUCATION/TRAINING PROGRAM

## 2017-05-26 PROCEDURE — 37000009 ZZH ANESTHESIA TECHNICAL FEE, EACH ADDTL 15 MIN: Performed by: STUDENT IN AN ORGANIZED HEALTH CARE EDUCATION/TRAINING PROGRAM

## 2017-05-26 RX ORDER — OXYCODONE HCL 5 MG/5 ML
5-10 SOLUTION, ORAL ORAL
Status: DISCONTINUED | OUTPATIENT
Start: 2017-05-26 | End: 2017-05-27

## 2017-05-26 RX ORDER — POTASSIUM CHLORIDE 29.8 MG/ML
20 INJECTION INTRAVENOUS ONCE
Status: COMPLETED | OUTPATIENT
Start: 2017-05-26 | End: 2017-05-26

## 2017-05-26 RX ORDER — POTASSIUM CHLORIDE 29.8 MG/ML
20 INJECTION INTRAVENOUS ONCE
Status: DISCONTINUED | OUTPATIENT
Start: 2017-05-26 | End: 2017-05-26

## 2017-05-26 RX ORDER — GUAR GUM
1 PACKET (EA) ORAL
Status: DISCONTINUED | OUTPATIENT
Start: 2017-05-26 | End: 2017-06-16

## 2017-05-26 RX ORDER — PROPOFOL 10 MG/ML
INJECTION, EMULSION INTRAVENOUS PRN
Status: DISCONTINUED | OUTPATIENT
Start: 2017-05-26 | End: 2017-05-26

## 2017-05-26 RX ORDER — FENTANYL CITRATE 50 UG/ML
INJECTION, SOLUTION INTRAMUSCULAR; INTRAVENOUS PRN
Status: DISCONTINUED | OUTPATIENT
Start: 2017-05-26 | End: 2017-05-26

## 2017-05-26 RX ORDER — DIPHENOXYLATE HCL/ATROPINE 2.5-.025/5
10 LIQUID (ML) ORAL 4 TIMES DAILY
Status: DISCONTINUED | OUTPATIENT
Start: 2017-05-26 | End: 2017-06-29 | Stop reason: HOSPADM

## 2017-05-26 RX ORDER — OXYCODONE HCL 5 MG/5 ML
10 SOLUTION, ORAL ORAL EVERY 4 HOURS
Status: DISCONTINUED | OUTPATIENT
Start: 2017-05-26 | End: 2017-05-27

## 2017-05-26 RX ORDER — SODIUM CHLORIDE, SODIUM LACTATE, POTASSIUM CHLORIDE, CALCIUM CHLORIDE 600; 310; 30; 20 MG/100ML; MG/100ML; MG/100ML; MG/100ML
INJECTION, SOLUTION INTRAVENOUS CONTINUOUS PRN
Status: DISCONTINUED | OUTPATIENT
Start: 2017-05-26 | End: 2017-05-26

## 2017-05-26 RX ORDER — OXYCODONE HYDROCHLORIDE 10 MG/1
10 TABLET ORAL EVERY 4 HOURS
Status: DISCONTINUED | OUTPATIENT
Start: 2017-05-26 | End: 2017-05-26

## 2017-05-26 RX ORDER — POTASSIUM CHLORIDE 20MEQ/15ML
40 LIQUID (ML) ORAL DAILY
Status: DISCONTINUED | OUTPATIENT
Start: 2017-05-26 | End: 2017-05-30

## 2017-05-26 RX ORDER — OXYCODONE HCL 5 MG/5 ML
5 SOLUTION, ORAL ORAL
Status: DISCONTINUED | OUTPATIENT
Start: 2017-05-26 | End: 2017-05-26

## 2017-05-26 RX ORDER — DEXMEDETOMIDINE HYDROCHLORIDE 4 UG/ML
0.2-1.2 INJECTION, SOLUTION INTRAVENOUS CONTINUOUS
Status: DISCONTINUED | OUTPATIENT
Start: 2017-05-26 | End: 2017-05-26 | Stop reason: HOSPADM

## 2017-05-26 RX ADMIN — LOPERAMIDE HYDROCHLORIDE 4 MG: 1 SOLUTION ORAL at 20:46

## 2017-05-26 RX ADMIN — POTASSIUM CHLORIDE 20 MEQ: 29.8 INJECTION, SOLUTION INTRAVENOUS at 08:48

## 2017-05-26 RX ADMIN — LOPERAMIDE HYDROCHLORIDE 4 MG: 1 SOLUTION ORAL at 15:25

## 2017-05-26 RX ADMIN — DEXMEDETOMIDINE HYDROCHLORIDE 0.7 MCG/KG/HR: 4 INJECTION, SOLUTION INTRAVENOUS at 10:28

## 2017-05-26 RX ADMIN — PIPERACILLIN SODIUM,TAZOBACTAM SODIUM 3.38 G: 3; .375 INJECTION, POWDER, FOR SOLUTION INTRAVENOUS at 14:20

## 2017-05-26 RX ADMIN — MINERAL SUPPLEMENT IRON 300 MG / 5 ML STRENGTH LIQUID 100 PER BOX UNFLAVORED 300 MG: at 14:28

## 2017-05-26 RX ADMIN — POTASSIUM CHLORIDE 40 MEQ: 40 SOLUTION ORAL at 15:25

## 2017-05-26 RX ADMIN — MIDAZOLAM HYDROCHLORIDE 1 MG: 1 INJECTION, SOLUTION INTRAMUSCULAR; INTRAVENOUS at 10:17

## 2017-05-26 RX ADMIN — GABAPENTIN 300 MG: 250 SOLUTION ORAL at 14:29

## 2017-05-26 RX ADMIN — HEPARIN SODIUM 5000 UNITS: 5000 INJECTION, SOLUTION INTRAVENOUS; SUBCUTANEOUS at 14:24

## 2017-05-26 RX ADMIN — ACETAMINOPHEN 975 MG: 160 SUSPENSION ORAL at 05:09

## 2017-05-26 RX ADMIN — OXYCODONE HYDROCHLORIDE 15 MG: 5 SOLUTION ORAL at 00:05

## 2017-05-26 RX ADMIN — OXYCODONE HYDROCHLORIDE 15 MG: 5 SOLUTION ORAL at 12:01

## 2017-05-26 RX ADMIN — OXYCODONE HYDROCHLORIDE 10 MG: 5 SOLUTION ORAL at 03:06

## 2017-05-26 RX ADMIN — POTASSIUM CHLORIDE 20 MEQ: 29.8 INJECTION, SOLUTION INTRAVENOUS at 15:25

## 2017-05-26 RX ADMIN — OXYCODONE HYDROCHLORIDE 15 MG: 5 SOLUTION ORAL at 06:47

## 2017-05-26 RX ADMIN — HEPARIN SODIUM 5000 UNITS: 5000 INJECTION, SOLUTION INTRAVENOUS; SUBCUTANEOUS at 20:47

## 2017-05-26 RX ADMIN — ACETAMINOPHEN 975 MG: 160 SUSPENSION ORAL at 20:45

## 2017-05-26 RX ADMIN — Medication 5 ML: at 15:25

## 2017-05-26 RX ADMIN — OXYCODONE HYDROCHLORIDE 5 MG: 5 SOLUTION ORAL at 18:19

## 2017-05-26 RX ADMIN — METOPROLOL TARTRATE 25 MG: 100 TABLET ORAL at 20:46

## 2017-05-26 RX ADMIN — Medication 6 MG: at 03:03

## 2017-05-26 RX ADMIN — PROPOFOL 20 MG: 10 INJECTION, EMULSION INTRAVENOUS at 10:46

## 2017-05-26 RX ADMIN — GABAPENTIN 300 MG: 250 SOLUTION ORAL at 21:01

## 2017-05-26 RX ADMIN — OXYCODONE HYDROCHLORIDE 10 MG: 5 SOLUTION ORAL at 21:03

## 2017-05-26 RX ADMIN — TRAZODONE HYDROCHLORIDE 100 MG: 50 TABLET ORAL at 22:08

## 2017-05-26 RX ADMIN — HYDROMORPHONE HYDROCHLORIDE 0.5 MG: 1 INJECTION, SOLUTION INTRAMUSCULAR; INTRAVENOUS; SUBCUTANEOUS at 22:34

## 2017-05-26 RX ADMIN — OXYCODONE HYDROCHLORIDE 10 MG: 5 SOLUTION ORAL at 18:18

## 2017-05-26 RX ADMIN — SODIUM CHLORIDE, POTASSIUM CHLORIDE, SODIUM LACTATE AND CALCIUM CHLORIDE: 600; 310; 30; 20 INJECTION, SOLUTION INTRAVENOUS at 10:17

## 2017-05-26 RX ADMIN — PROPOFOL 50 MG: 10 INJECTION, EMULSION INTRAVENOUS at 10:33

## 2017-05-26 RX ADMIN — GABAPENTIN 300 MG: 250 SOLUTION ORAL at 05:09

## 2017-05-26 RX ADMIN — Medication 6 MG: at 20:45

## 2017-05-26 RX ADMIN — OXYCODONE HYDROCHLORIDE 15 MG: 5 SOLUTION ORAL at 14:57

## 2017-05-26 RX ADMIN — ACETAMINOPHEN 975 MG: 160 SUSPENSION ORAL at 15:24

## 2017-05-26 RX ADMIN — ALTEPLASE 2 MG: 2.2 INJECTION, POWDER, LYOPHILIZED, FOR SOLUTION INTRAVENOUS at 20:48

## 2017-05-26 RX ADMIN — PIPERACILLIN SODIUM,TAZOBACTAM SODIUM 3.38 G: 3; .375 INJECTION, POWDER, FOR SOLUTION INTRAVENOUS at 18:24

## 2017-05-26 RX ADMIN — HYDROMORPHONE HYDROCHLORIDE 0.5 MG: 1 INJECTION, SOLUTION INTRAMUSCULAR; INTRAVENOUS; SUBCUTANEOUS at 05:38

## 2017-05-26 RX ADMIN — PIPERACILLIN SODIUM,TAZOBACTAM SODIUM 3.38 G: 3; .375 INJECTION, POWDER, FOR SOLUTION INTRAVENOUS at 00:05

## 2017-05-26 RX ADMIN — Medication 10 ML: at 20:48

## 2017-05-26 RX ADMIN — HEPARIN SODIUM 5000 UNITS: 5000 INJECTION, SOLUTION INTRAVENOUS; SUBCUTANEOUS at 05:09

## 2017-05-26 RX ADMIN — PANTOPRAZOLE SODIUM 40 MG: 40 TABLET, DELAYED RELEASE ORAL at 14:28

## 2017-05-26 RX ADMIN — Medication 1 PACKET: at 21:01

## 2017-05-26 RX ADMIN — HYDROMORPHONE HYDROCHLORIDE 0.5 MG: 1 INJECTION, SOLUTION INTRAMUSCULAR; INTRAVENOUS; SUBCUTANEOUS at 08:59

## 2017-05-26 RX ADMIN — LIDOCAINE 3 PATCH: 50 PATCH TOPICAL at 14:59

## 2017-05-26 RX ADMIN — FLUCONAZOLE 200 MG: 2 INJECTION INTRAVENOUS at 20:45

## 2017-05-26 RX ADMIN — FENTANYL CITRATE 50 MCG: 50 INJECTION, SOLUTION INTRAMUSCULAR; INTRAVENOUS at 10:33

## 2017-05-26 RX ADMIN — PIPERACILLIN SODIUM,TAZOBACTAM SODIUM 3.38 G: 3; .375 INJECTION, POWDER, FOR SOLUTION INTRAVENOUS at 06:09

## 2017-05-26 NOTE — ANESTHESIA POSTPROCEDURE EVALUATION
Patient: Minerva Blanco    Procedure(s):  Irrigation and Debridement Chest Washout with  dressing change - Wound Class: IV-Dirty or Infected    Diagnosis:Non healing wound   Diagnosis Additional Information: No value filed.    Anesthesia Type:  MAC    Note:  Anesthesia Post Evaluation    Patient location during evaluation: PACU  Patient participation: Able to fully participate in evaluation  Level of consciousness: awake and alert  Pain management: adequate  Cardiovascular status: acceptable  Respiratory status: acceptable  Hydration status: acceptable  PONV: none             Last vitals:  Vitals:    05/25/17 2308 05/26/17 0747 05/26/17 1122   BP: 128/52 137/60 136/60   Pulse:      Resp: 16 16    Temp: 36  C (96.8  F) 35.7  C (96.2  F)    SpO2: 98% 95% 100%         Electronically Signed By: Byron Rodriguez MD  May 26, 2017  11:57 AM

## 2017-05-26 NOTE — ANESTHESIA POSTPROCEDURE EVALUATION
Patient: Minerva Blanco    Procedure(s):  Irrigation and Debridement Chest Washout with  dressing change - Wound Class: IV-Dirty or Infected    Diagnosis:Non healing wound   Diagnosis Additional Information: No value filed.    Anesthesia Type:  MAC    Note:  Anesthesia Post Evaluation    Last vitals:  Vitals:    05/25/17 2308 05/26/17 0747 05/26/17 1122   BP: 128/52 137/60 136/60   Pulse:      Resp: 16 16    Temp: 36  C (96.8  F) 35.7  C (96.2  F)    SpO2: 98% 95% 100%         Electronically Signed By: Byron Rodriguez MD  May 26, 2017  11:59 AM

## 2017-05-26 NOTE — OP NOTE
DATE OF PROCEDURE:  2017      PREOPERATIVE DIAGNOSIS:  Open chest wound, status post esophagectomy with leak.       POSTOPERATIVE DIAGNOSIS:  Open chest wound, status post esophagectomy with leak.       PROCEDURE PERFORMED:  Chest wound washout and dressing change.      SURGEON:  Conchis Marrero MD      ASSISTING SURGEON:  Kd Liu MD      DESCRIPTION OF PROCEDURE:  After obtaining informed consent, Favian Malone was brought to the operating room and laid on the operating table with the head up to prevent aspiration.  The previously placed packing was then removed.  It revealed some amount of drainage from the esophageal leak; however, there was no significant purulence.  The cavities were then washed out with warm saline and a new packing was then inserted with Xeroform on the area of the anastomotic breakdown.  The patient tolerated the procedure well.         CONCHIS MARRERO MD             D: 2017 09:58   T: 2017 08:30   MT: nigel      Name:     FAVIAN MALONE   MRN:      [id number]        Account:        [id number]   :      1945           Procedure Date: 2017      Document: [id number]

## 2017-05-26 NOTE — PLAN OF CARE
Problem: Goal Outcome Summary  Goal: Goal Outcome Summary  Outcome: No Change  /50 (BP Location: Right arm)  Pulse 66  Temp 96.7  F (35.9  C) (Oral)  Resp 16  Ht 1.524 m (5')  Wt 48.7 kg (107 lb 6.4 oz)  SpO2 98%  Breastfeeding? No  BMI 20.98 kg/m2   Afebrile, c/o pain oxy 15mg x2 and IV dilaudid x1, chest dressing is CDI, drain is draining to gravity,30cc output, J-tube is intact, TF increased to 20ml/hr from 4p-10p, reports not tolerating TF, large loose stools, decreased to 15ml/hr at 10pm, IV abx given as per orders, scheduled AM for wound washout. Up to the commode independently at the bedside. Voiding adequately. Cont with POC

## 2017-05-26 NOTE — PROGRESS NOTES
CLINICAL NUTRITION SERVICES - BRIEF NOTE     Nutrition Prescription    RECOMMENDATIONS FOR MDs/PROVIDERS TO ORDER:  1. If unable to adv to and tolerate goal TF in the next 1-2 days, strongly consider need to start TPN  2. Adjust free water flushes per team discretion    3. If to start PN, would rec start TPN @ 60 mL/hr (1440 mL/day, or per provider pending fluid status/IVF requirements) with 85 g dextrose (GIR 1.4 mg/kg/min) , 80 g AA, and 250 mL 20% IV lipids 5x/week. Rec adv dextrose by 50 g/day if K+/Mg++ >/= nrml and phos >1.9 and BGs stable to goal dextrose 185 g/day.  Goal TPN would provide 1306 kcal (30 kcal/kg), 1.9 g/kg PRO, GIR 3 mg/kg/min, and 27% kcal from fat per dosing weight 43 kg    Recommendations already ordered by Registered Dietitian (RD):  Once K+/Mg++ >/= nrml and phos >1.9, Switch to TwoCal HN (more calorically dense) and start @ 10 mL/hr and adv rate by 10 mL q 6 hours as tolerated and if K+/Mg++ >/= nrml and phos >1.9  to goal rate TwoCal HN @ 50 mL/hr.      -- Once tolerated at goal x ~2 hours stop and begin cycled TF regimen of TwoCal HN @ 50 mL/hr x 16 hours (800 mL/day) from ~11 AM - 3 AM daily (or to start whenever pt gets back from OR daily; TF schedule may be adjusted pending OR schedule, goal for TF @ goal x 16 hours daily). This will provide 1600 kcal (37 kcal/kg), 67 g PRO (1.6 g/kg), 175 g CHO, 73 g fat, 4 g fiber, and 560 mL free water daily resume TF @ 15 mL/hr and adv TF rate by 10 mL q 6 hours as tolerated and if K+/Mg++ >/= nrml and phos >1.9  to goal rate Peptamen 1.5 @ 65 mL/hr.    -- 90 mL water flushes q2h  (additional 1080 mL free water daily so free water from TF + free water flushes = 1640 mL/day free water (38 mL/kg) which meets 100% estimated fluid needs)  -- Monitor K+/mg++/Phos daily while adv to goal TF rate as pt remains at refeeding risk       - Labs: Mg++ and Phos WNL; K+ 2.9 (L), orders for one-time replacement this afternoon and to be rechecked tonight; BUN 2  (L), trending down, may indicate low protein intake; Cr 0.36 (L), may indicate low muscle mass.   - Meds: no longer on IVF. Daily potassium supplementation ordered today (40 mEq KCL daily).  - TF: TF frequently turned off or turned down since admit x 8 days 2/2 not tolerating TF advances at or beyond ~25 mL/hr the past week    INTERVENTIONS  Implementation  1. Collaboration with other providers: discussed ongoing TF intolerance issues with team. Team would like to try and cycle TF during the day for 16 hours (team aware that will have to run @ a higher rate which may not be well tolerated) so can be off during the night so patient can sleep better, and hopefully TF won't be stopped as much.  Patient going to OR everyday, so TF schedule may need to be adjusted so still able to get goal volumes everyday.  If TF tolerance does not starting improving, team will consider TPN  2. Nutrition Education: discussed if pt willing to try cycling TF during the day (at a higher rate) so can be off during the night, pt agreeable if can try a different formula (TwoCal HN which is more calorically dense and would require a lower goal rate). Pt asking about TPN as she is concerned about TF tolerance not improving, advised that writer has asked about TPN possibility with team   3. Enteral Nutrition- switch formula and start cycling    Monitoring/Evaluation  Progress toward goals will be monitored and evaluated per protocol.     Evie Montanez, ALEX, LD   [address] RD Pager: [phone]

## 2017-05-26 NOTE — PLAN OF CARE
Problem: Goal Outcome Summary  Goal: Goal Outcome Summary  OT/7B: Pt out of room x 2 attempts, will reschedule

## 2017-05-26 NOTE — ANESTHESIA PREPROCEDURE EVALUATION
Anesthesia Evaluation     . Pt has had prior anesthetic. Type: General and MAC           ROS/MED HX    ENT/Pulmonary:       Neurologic:  - neg neurologic ROS     Cardiovascular:  - neg cardiovascular ROS       METS/Exercise Tolerance:     Hematologic:     (+) Anemia, -      Musculoskeletal:  - neg musculoskeletal ROS       GI/Hepatic:         Renal/Genitourinary:  - ROS Renal section negative       Endo:  - neg endo ROS       Psychiatric:  - neg psychiatric ROS       Infectious Disease:  - neg infectious disease ROS       Malignancy:      - no malignancy   Other:                     Physical Exam      Airway   Mallampati: III  TM distance: <3 FB  Neck ROM: limited    Dental     Cardiovascular   Rhythm and rate: regular      Pulmonary    breath sounds clear to auscultation    Other findings: Large dressings over anterior chest.  PICC line L AC  Anisocoria R>L                Anesthesia Plan      History & Physical Review      ASA Status:  3 .        Plan for MAC with Intravenous induction. Maintenance will be TIVA.  Reason for MAC:  Deep or markedly invasive procedure (G8)  PONV prophylaxis:  Ondansetron (or other 5HT-3)  I have examined the patient and reviewed the chart.  I have discussed the patient with Dr. Gee and concur with her assessment and plan.  The patient was very happy with the MAC given yesterday with precedex 0.7 mg/kg/hr.  Wishes us to use the same regimen.    Byron Rodriguez MD      Postoperative Care  Postoperative pain management:  IV analgesics.      Consents        ANESTHESIA PREOP EVALUATION    PROCEDURE: Procedure(s):  Irrigation and Debridement Chest Washout  - Wound Class:     HPI: Minerva Blanco is a 72 year old female with hx haital hernia repair c/b chronic esophageal perforation, now s/p esophagectomy with a substernal abscess who presents for the above procedure.     PAST MEDICAL HISTORY:  Past Medical History:   Diagnosis Date     Atrial fibrillation (H)      Breast cancer (H)  2007    right side - lumpectomy, chemo, and local radiation     Esophageal perforation      Fibromyalgia      GERD (gastroesophageal reflux disease)      Hiatal hernia      History of blood transfusion      IBS (irritable bowel syndrome)      Meniere's disease     deaf left ear     MS (multiple sclerosis) (H)        PAST SURGICAL HISTORY:  Past Surgical History:   Procedure Laterality Date     APPENDECTOMY       BACK SURGERY  5/1/2015    lumbar fusion     BRONCHOSCOPY FLEXIBLE N/A 4/17/2017    Procedure: BRONCHOSCOPY FLEXIBLE;;  Surgeon: Jens Wise MD;  Location: [facility]     C GASTROSTOMY/JEJUN TUBE      J tube for feedings     CLOSE SPIT FISTULA N/A 4/17/2017    Procedure: CLOSE SPIT FISTULA;;  Surgeon: Jens Wise MD;  Location: [facility]     COMBINED ESOPHAGOSCOPY, GASTROSCOPY, DUODENOSCOPY (EGD), REMOVE ESOPHAGEAL STENT N/A 8/26/2016    Procedure: COMBINED ESOPHAGOSCOPY, GASTROSCOPY, DUODENOSCOPY (EGD), REMOVE ESOPHAGEAL STENT;  Surgeon: Jens Wise MD;  Location: [facility]     CREATE SPIT FISTULA N/A 1/9/2017    Procedure: CREATE SPIT FISTULA;  Surgeon: Jens Wise MD;  [facility]: [facility]     ESOPHAGECTOMY N/A 1/9/2017    Procedure: ESOPHAGECTOMY;  Surgeon: Jens Wise MD;  [facility]     ESOPHAGOSCOPY, GASTROSCOPY, DUODENOSCOPY (EGD), COMBINED N/A 4/18/2016    Procedure: COMBINED ESOPHAGOSCOPY, GASTROSCOPY, DUODENOSCOPY (EGD);  Surgeon: Alexis Barraza MD;  [facility]     ESOPHAGOSCOPY, GASTROSCOPY, DUODENOSCOPY (EGD), COMBINED N/A 4/25/2016    Procedure: COMBINED ESOPHAGOSCOPY, GASTROSCOPY, DUODENOSCOPY (EGD);  Surgeon: Alexis Barraza MD;  [facility]     ESOPHAGOSCOPY, GASTROSCOPY, DUODENOSCOPY (EGD), COMBINED N/A 5/4/2016    Procedure: COMBINED ESOPHAGOSCOPY, GASTROSCOPY, DUODENOSCOPY (EGD);  Surgeon: Alexis Barraza MD;  [facility]     ESOPHAGOSCOPY, GASTROSCOPY, DUODENOSCOPY (EGD), COMBINED N/A  5/18/2016    Procedure: COMBINED ESOPHAGOSCOPY, GASTROSCOPY, DUODENOSCOPY (EGD);  Surgeon: Alexis Barraza MD;  [facility]     ESOPHAGOSCOPY, GASTROSCOPY, DUODENOSCOPY (EGD), COMBINED N/A 6/22/2016    Procedure: COMBINED ESOPHAGOSCOPY, GASTROSCOPY, DUODENOSCOPY (EGD);  Surgeon: Alexis Barraza MD;  [facility]     ESOPHAGOSCOPY, GASTROSCOPY, DUODENOSCOPY (EGD), COMBINED N/A 7/12/2016    Procedure: COMBINED ESOPHAGOSCOPY, GASTROSCOPY, DUODENOSCOPY (EGD);  Surgeon: Jens Wise MD;  [facility]     ESOPHAGOSCOPY, GASTROSCOPY, DUODENOSCOPY (EGD), COMBINED N/A 4/21/2017    Procedure: COMBINED ESOPHAGOSCOPY, GASTROSCOPY, DUODENOSCOPY (EGD);  Esophagogastroduodenoscopy, Pharyngostomy Tube Placement ;  Surgeon: Jens Wise MD;  [facility]     ESOPHAGOSCOPY, GASTROSCOPY, DUODENOSCOPY (EGD), COMBINED N/A 5/19/2017    Procedure: COMBINED ESOPHAGOSCOPY, GASTROSCOPY, DUODENOSCOPY (EGD);  Upper Endoscopy, Irrigation and Debridement of Chest Wound ;  Surgeon: Nalini Barreto MD;  [facility]     ESOPHAGOSCOPY, GASTROSCOPY, DUODENOSCOPY (EGD), COMBINED N/A 5/23/2017    Procedure: COMBINED ESOPHAGOSCOPY, GASTROSCOPY, DUODENOSCOPY (EGD);;  Surgeon: Nalini Barreto MD;  [facility]     ESOPHAGOSCOPY, GASTROSCOPY, DUODENOSCOPY (EGD), DILATATION, COMBINED N/A 6/29/2016    Procedure: COMBINED ESOPHAGOSCOPY, GASTROSCOPY, DUODENOSCOPY (EGD), DILATATION;  Surgeon: Jens Wise MD;  [facility]     EXPLORE NECK N/A 5/19/2017    Procedure: EXPLORE NECK;;  Surgeon: Nalini Barreto MD;  [facility]     HC UGI ENDOSCOPY W TRANSENDOSCOPIC STENT PLACEMENT N/A 7/12/2016    Procedure: COMBINED ESOPHAGOSCOPY, GASTROSCOPY, DUODENOSCOPY (EGD), PLACE TRANSENDOSCOPIC ESOPHAGEAL STENT;  Surgeon: Jens Wise MD;  [facility]      UGI ENDOSCOPY W TRANSENDOSCOPIC STENT PLACEMENT N/A 7/22/2016    Procedure: COMBINED ESOPHAGOSCOPY, GASTROSCOPY,  DUODENOSCOPY (EGD), PLACE TRANSENDOSCOPIC ESOPHAGEAL STENT;  Surgeon: Jens Wise MD;  [facility]     IRRIGATION AND DEBRIDEMENT CHEST WASHOUT, COMBINED N/A 6/29/2016    Procedure: COMBINED IRRIGATION AND DEBRIDEMENT CHEST WASHOUT;  Surgeon: Jens Wise MD;  [facility]     IRRIGATION AND DEBRIDEMENT CHEST WASHOUT, COMBINED N/A 5/23/2017    Procedure: COMBINED IRRIGATION AND DEBRIDEMENT CHEST WASHOUT;  COMBINED IRRIGATION AND DEBRIDEMENT CHEST WASHOUT,COMBINED ESOPHAGOSCOPY, GASTROSCOPY, DUODENOSCOPY (EGD) ;  Surgeon: Nalini Barreto MD;  [facility]     IRRIGATION AND DEBRIDEMENT CHEST WASHOUT, COMBINED N/A 5/24/2017    Procedure: COMBINED IRRIGATION AND DEBRIDEMENT CHEST WASHOUT;  Chest wound Washout and Dressing Change;  Surgeon: Nalini Barreto MD;  [facility]     LAPAROSCOPIC ASSISTED INSERTION TUBE JEJUNOSTOMY N/A 4/17/2017    Procedure: LAPAROSCOPIC ASSISTED INSERTION TUBE JEJUNOSTOMY;;  Surgeon: Jens Wise MD;  [facility]     LAPAROSCOPY DIAGNOSTIC (GENERAL) N/A 1/9/2017    Procedure: LAPAROSCOPY DIAGNOSTIC (GENERAL);  Surgeon: Jens Wise MD;  [facility]     LAPAROSCOPY DIAGNOSTIC (GENERAL) N/A 4/17/2017    Procedure: LAPAROSCOPY DIAGNOSTIC (GENERAL);  Laparoscopic , Neck Dissection, Spit Fistula Takedown, Laparoscopic Jejunostomy Tube and Pharyngostomy Tube, Gastric Pull up, Upper Endoscopy(EGD)  , Flexible Bronchoscopy ,Sternotomy ;  Surgeon: Jens Wise MD;  [facility]     LUMPECTOMY BREAST Right 2007     NERVE BLOCK PERIPHERAL N/A 8/30/2016    Procedure: NERVE BLOCK PERIPHERAL;  Surgeon: GENERIC ANESTHESIA PROVIDER;  Location: [facility]     NISSEN FUNDOPLICATION  1/2016     PHARYNGOSTOMY N/A 4/18/2016    Procedure: PHARYNGOSTOMY;  Surgeon: Alexis Barraza MD;  [facility]     PHARYNGOSTOMY N/A 4/25/2016    Procedure: PHARYNGOSTOMY;  Surgeon: Alexis Barraza MD;  [facility]  "    PHARYNGOSTOMY N/A 5/4/2016    Procedure: PHARYNGOSTOMY;  Surgeon: Alexis Barraza MD;  [facility]     PHARYNGOSTOMY N/A 6/22/2016    Procedure: PHARYNGOSTOMY;  Surgeon: Alexis Barraza MD;  [facility]     PHARYNGOSTOMY N/A 6/29/2016    Procedure: PHARYNGOSTOMY;  Surgeon: Jens Wise MD;  [facility]     PHARYNGOSTOMY N/A 4/21/2017    Procedure: PHARYNGOSTOMY;;  Surgeon: Jens Wise MD;  [facility]     PICC INSERTION Left 8/25/2016    5fr DL BioFlo PICC, 42cm (3cm external) in the L medial brachial vein w/ tip in the SVC RA junction.     THORACOTOMY Right 1998    lung infection - \"hard crust formed on lung\"     THORACOTOMY Left 1/9/2017    Procedure: THORACOTOMY;  Surgeon: Jens Wise MD;  Location[facility]     WRIST SURGERY         SOCIAL HISTORY:   Social History   Substance Use Topics     Smoking status: Former Smoker     Packs/day: 1.00     Years: 15.00     Types: Cigarettes     Quit date: 1/1/1998     Smokeless tobacco: Not on file     Alcohol use No       ALLERGIES:   Allergies   Allergen Reactions     Amoxicillin Diarrhea     Ativan [Lorazepam] Other (See Comments)     Hallucinations     Hydromorphone Itching     4/12/17 - patient open to using this as she tolerated Hydromorphone PCA during hospitalization in January 2017.      Morphine Itching       MEDICATIONS:  Prescriptions Prior to Admission   Medication Sig Dispense Refill Last Dose     oxyCODONE (ROXICODONE) 10 MG IR tablet Take 1 to 1.5 tablet every 3-4 hours PRN pain 60 tablet 0 5/18/2017 at 1200     diphenoxylate-atropine (LOMOTIL) 0.5-0.005 mg/mL liquid Take 5 mLs by mouth 4 times daily as needed for diarrhea 300 mL 0 5/18/2017 at 0800     fiber modular (NUTRISOURCE FIBER) packet 1 packet by Per Feeding Tube route 3 times daily (with meals) 60 packet 0 Unknown at Unknown time     metoprolol (LOPRESSOR) 10 mg/mL SUSP 2.5 mLs (25 mg) by Per J Tube route 2 times daily  "  5/18/2017 at 0800     opium tincture 10 mg/mL (1%) liquid Take 0.6 mLs (6 mg) by mouth every 6 hours 118 mL 0 5/18/2017 at 0800     ferrous sulfate 300 (60 FE) MG/5ML syrup 5 mLs (300 mg) by Per J Tube route daily 150 mL 0 5/17/2017 at 2000     gabapentin (NEURONTIN) 250 MG/5ML solution Take 6 mLs (300 mg) by mouth every 8 hours 450 mL 0 5/18/2017 at 0800     traZODone (DESYREL) 100 MG tablet 0.5 tablets (50 mg) by Per G Tube route At Bedtime (Patient taking differently: 100 mg by Per G Tube route At Bedtime ) 30 tablet 0 5/18/2017 at 2200     simethicone (MYLICON) 40 MG/0.6ML suspension 0.6 mLs (40 mg) by Per Feeding Tube route every 6 hours as needed for cramping 30 mL 0 5/18/2017 at 0800     ranitidine (ZANTAC) 150 MG/10ML syrup Take 10 mLs (150 mg) by mouth 2 times daily 600 mL 0 5/18/2017 at 0800     warfarin (COUMADIN) 2.5 MG tablet Take 1 tablet (2.5 mg) by mouth daily (Patient not taking: Reported on 5/18/2017) 30 tablet 1 Unknown at Unknown time     order for DME Equipment being ordered:   [facility] [place]termittent  Suction Canisters(2)  Suction Canister Holders(2)  Suction Connect Tube(2)  5 in 1 Connector(2)  Bacteria Filter(2)  Yaunkauer Suction(2)    Treatment Diagnosis: Esophogeal Perforation, s/p esophagectomy 1 Device 0 Taking     order for DME Equipment being ordered:   Suction Pump  Suction Canister(2)  Suction Tubing(2)  Bacteria Filter(2)  Yaunkauer(4)  Red Rubber Catheter(2)    Treatment Diagnosis: Esophogeal Perforation, s/p esophagectomy 1 Device 0 Taking     DiphenhydrAMINE HCl (BENADRYL PO) Take 25 mg by mouth nightly as needed   5/17/2017 at 2000     cholestyramine (QUESTRAN) 4 G Packet Take 1 packet by mouth daily   5/18/2017 at 0800     multivitamins with minerals (CERTAVITE/CEROVITE) LIQD liquid Take 15 mLs by mouth daily   5/17/2017 at 0800     loperamide (IMODIUM) 1 MG/5ML liquid Take 20 mLs (4 mg) by mouth 4 times daily as needed for diarrhea (Patient taking differently:  Take 4 mg by mouth 2 times daily ) 200 mL  5/18/2017 at 0800     Lactobacillus Rhamnosus, GG, (CULTURELLE PO) Take 1 tablet by mouth 2 times daily    5/18/2017 at 0800       NPO STATUS: NPO after midnight.  LABS:      BMP:  Recent Labs   Lab Test  05/25/17   1147  05/25/17   0653   NA   --   143   POTASSIUM   --   4.4   CHLORIDE   --   110*   CO2   --   24   BUN   --   3*   CR   --   0.45*   GLC   --   82   ANNABELLE  8.2*  5.7*       LFTs:   Recent Labs   Lab Test  04/17/17   0653   PROTTOTAL  8.6   ALBUMIN  3.6   BILITOTAL  0.8   ALKPHOS  283*   AST  38   ALT  56*       CBC:   Recent Labs   Lab Test  05/25/17   0641   WBC  7.7   RBC  2.60*   HGB  7.2*   HCT  23.1*   MCV  89   MCH  27.7   MCHC  31.2*   RDW  16.0*   PLT  474*       Coags:  Recent Labs   Lab Test  05/20/17   0334  05/19/17   1650   INR  1.33*  1.87*   PTT   --   54*   FIBR   --   497*         ASSESSMENT & PLAN:  - ASA 3  - MAC with standard ASA monitors, IV induction, and balanced anesthetic  - PIV x1  - Antibiotics per surgery  - PONV prophylaxis  - Blood products available, possible administration discussed with patient  - Relevant risks, benefits, alternatives and the anesthetic plan was discussed.  All questions were answered and there was agreement to proceed.      France Gee CA1  pager [phone]

## 2017-05-26 NOTE — PLAN OF CARE
Problem: Goal Outcome Summary  Goal: Goal Outcome Summary  Outcome: No Change  Pt returned from OR post wound washout around 10:45am.  VSS. Pain controlled adequately with dilaudid, oxycodone and tylenol. Potassium replaced in preop, as well additional orders placed for 7B.  Pt remains NPO. 25ml output from chest tube creamy/serosanginous in color. Dietary consult this afternoon, will be changing feeding tube formula for increased nutrition as patient was told she would be NPO for another month.  IV ABX given per order.  at bedside this am.

## 2017-05-26 NOTE — PLAN OF CARE
Problem: Goal Outcome Summary  Goal: Goal Outcome Summary  Outcome: No Change  VSS. Chest dressing CDI. CT drain intact, minimal serosanguinous-creamy output. New TF started about 4pm, currently running at 20ml/hr to J tube. All meds thru J tube. Scheduled tylenol, lido patches and prn dilaudid and oxy for pain. Encouraged pt to ask for IV dilaudid d/t uncontrolled pain. K+ replacement given, recheck drawn and pending. Purple lumen to PICC remains clogged, received additional order for altepase, awaiting pharmacy to sent up. Scheduled IV zosyn. Up ind to commode with frequent diarrhea. NPO.

## 2017-05-26 NOTE — PLAN OF CARE
Problem: Goal Outcome Summary  Goal: Goal Outcome Summary  Vital signs:  Temp: 96.8  F (36  C) Temp src: Oral BP: 128/52   Heart Rate: 65 Resp: 16 SpO2: 98 % O2 Device: None (Room air)   VSS. Chest dressing in place, C/D/I. Chest tube to ojeda with scant serosanguinous output. J-tube clamped, pt turned off TF's around midnight D/T continued diarrhea. C/O pain in right upper chest, oxycodone x 2, and scheduled pain medications for pain control. IV abx infused. Large incontinent stool this AM. Plan for washout in OR this AM. Sleeping throughout the night.

## 2017-05-26 NOTE — ANESTHESIA CARE TRANSFER NOTE
Patient: Minerva Blanco    Procedure(s):  Irrigation and Debridement Chest Washout with  dressing change - Wound Class: IV-Dirty or Infected    Diagnosis: Non healing wound   Diagnosis Additional Information: No value filed.    Anesthesia Type:   MAC     Note:  Airway :Face Mask  Patient transferred to:Medical/Surgical Unit  Comments: VSS, sats 100% on 2 L NC, breathing nonlabored, pain controlled, sleepy but awakens to verbal stimulus and answers questions appropriately, report given to 7B RN.      Vitals: (Last set prior to Anesthesia Care Transfer)    CRNA VITALS  5/26/2017 1033 - 5/26/2017 1123      5/26/2017             Pulse: 55    SpO2: 100 %    Resp Rate (set): 10                Electronically Signed By: France Gee MD  May 26, 2017  11:23 AM

## 2017-05-27 ENCOUNTER — ANESTHESIA EVENT (OUTPATIENT)
Dept: SURGERY | Facility: CLINIC | Age: 72
DRG: 856 | End: 2017-05-27
Payer: MEDICARE

## 2017-05-27 ENCOUNTER — ANESTHESIA (OUTPATIENT)
Dept: SURGERY | Facility: CLINIC | Age: 72
DRG: 856 | End: 2017-05-27
Payer: MEDICARE

## 2017-05-27 LAB
ABO + RH BLD: NORMAL
ABO + RH BLD: NORMAL
ANION GAP SERPL CALCULATED.3IONS-SCNC: 9 MMOL/L (ref 3–14)
APTT PPP: 65 SEC (ref 22–37)
BASOPHILS # BLD AUTO: 0 10E9/L (ref 0–0.2)
BASOPHILS NFR BLD AUTO: 0.1 %
BLD GP AB SCN SERPL QL: NORMAL
BLOOD BANK CMNT PATIENT-IMP: NORMAL
BUN SERPL-MCNC: 2 MG/DL (ref 7–30)
C DIFF STL QL CULT: ABNORMAL
C DIFF TOX B STL QL: NORMAL
CALCIUM SERPL-MCNC: 8 MG/DL (ref 8.5–10.1)
CHLORIDE SERPL-SCNC: 114 MMOL/L (ref 94–109)
CO2 SERPL-SCNC: 22 MMOL/L (ref 20–32)
CREAT SERPL-MCNC: 0.43 MG/DL (ref 0.52–1.04)
DIFFERENTIAL METHOD BLD: ABNORMAL
EOSINOPHIL # BLD AUTO: 0.3 10E9/L (ref 0–0.7)
EOSINOPHIL NFR BLD AUTO: 1.9 %
ERYTHROCYTE [DISTWIDTH] IN BLOOD BY AUTOMATED COUNT: 16.8 % (ref 10–15)
GFR SERPL CREATININE-BSD FRML MDRD: ABNORMAL ML/MIN/1.7M2
GLUCOSE SERPL-MCNC: 76 MG/DL (ref 70–99)
HCT VFR BLD AUTO: 23 % (ref 35–47)
HGB BLD-MCNC: 7 G/DL (ref 11.7–15.7)
IMM GRANULOCYTES # BLD: 0 10E9/L (ref 0–0.4)
IMM GRANULOCYTES NFR BLD: 0.3 %
INR PPP: 1.43 (ref 0.86–1.14)
LYMPHOCYTES # BLD AUTO: 1 10E9/L (ref 0.8–5.3)
LYMPHOCYTES NFR BLD AUTO: 7.3 %
MAGNESIUM SERPL-MCNC: 1.7 MG/DL (ref 1.6–2.3)
MCH RBC QN AUTO: 27.7 PG (ref 26.5–33)
MCHC RBC AUTO-ENTMCNC: 30.4 G/DL (ref 31.5–36.5)
MCV RBC AUTO: 91 FL (ref 78–100)
MONOCYTES # BLD AUTO: 0.8 10E9/L (ref 0–1.3)
MONOCYTES NFR BLD AUTO: 6 %
NEUTROPHILS # BLD AUTO: 11.3 10E9/L (ref 1.6–8.3)
NEUTROPHILS NFR BLD AUTO: 84.4 %
NRBC # BLD AUTO: 0 10*3/UL
NRBC BLD AUTO-RTO: 0 /100
PHOSPHATE SERPL-MCNC: 2.7 MG/DL (ref 2.5–4.5)
PLATELET # BLD AUTO: 531 10E9/L (ref 150–450)
POTASSIUM SERPL-SCNC: 3.5 MMOL/L (ref 3.4–5.3)
RBC # BLD AUTO: 2.53 10E12/L (ref 3.8–5.2)
SODIUM SERPL-SCNC: 145 MMOL/L (ref 133–144)
SPECIMEN EXP DATE BLD: NORMAL
SPECIMEN SOURCE: ABNORMAL
SPECIMEN SOURCE: NORMAL
WBC # BLD AUTO: 13.4 10E9/L (ref 4–11)

## 2017-05-27 PROCEDURE — 25000132 ZZH RX MED GY IP 250 OP 250 PS 637: Mod: GY | Performed by: STUDENT IN AN ORGANIZED HEALTH CARE EDUCATION/TRAINING PROGRAM

## 2017-05-27 PROCEDURE — 86900 BLOOD TYPING SEROLOGIC ABO: CPT | Performed by: ANESTHESIOLOGY

## 2017-05-27 PROCEDURE — 25000125 ZZHC RX 250: Performed by: NURSE ANESTHETIST, CERTIFIED REGISTERED

## 2017-05-27 PROCEDURE — 12000003 ZZH R&B CRITICAL UMMC

## 2017-05-27 PROCEDURE — A9270 NON-COVERED ITEM OR SERVICE: HCPCS | Mod: GY | Performed by: SURGERY

## 2017-05-27 PROCEDURE — 40000802 ZZH SITE CHECK

## 2017-05-27 PROCEDURE — 40000170 ZZH STATISTIC PRE-PROCEDURE ASSESSMENT II: Performed by: STUDENT IN AN ORGANIZED HEALTH CARE EDUCATION/TRAINING PROGRAM

## 2017-05-27 PROCEDURE — A9270 NON-COVERED ITEM OR SERVICE: HCPCS | Mod: GY | Performed by: STUDENT IN AN ORGANIZED HEALTH CARE EDUCATION/TRAINING PROGRAM

## 2017-05-27 PROCEDURE — 25000128 H RX IP 250 OP 636: Performed by: STUDENT IN AN ORGANIZED HEALTH CARE EDUCATION/TRAINING PROGRAM

## 2017-05-27 PROCEDURE — 87493 C DIFF AMPLIFIED PROBE: CPT | Performed by: STUDENT IN AN ORGANIZED HEALTH CARE EDUCATION/TRAINING PROGRAM

## 2017-05-27 PROCEDURE — 27210429 ZZH NUTRITION PRODUCT INTERMEDIATE LITER

## 2017-05-27 PROCEDURE — 0JD60ZZ EXTRACTION OF CHEST SUBCUTANEOUS TISSUE AND FASCIA, OPEN APPROACH: ICD-10-PCS | Performed by: STUDENT IN AN ORGANIZED HEALTH CARE EDUCATION/TRAINING PROGRAM

## 2017-05-27 PROCEDURE — 86850 RBC ANTIBODY SCREEN: CPT | Performed by: ANESTHESIOLOGY

## 2017-05-27 PROCEDURE — 36592 COLLECT BLOOD FROM PICC: CPT | Performed by: STUDENT IN AN ORGANIZED HEALTH CARE EDUCATION/TRAINING PROGRAM

## 2017-05-27 PROCEDURE — 27210794 ZZH OR GENERAL SUPPLY STERILE: Performed by: STUDENT IN AN ORGANIZED HEALTH CARE EDUCATION/TRAINING PROGRAM

## 2017-05-27 PROCEDURE — 37000009 ZZH ANESTHESIA TECHNICAL FEE, EACH ADDTL 15 MIN: Performed by: STUDENT IN AN ORGANIZED HEALTH CARE EDUCATION/TRAINING PROGRAM

## 2017-05-27 PROCEDURE — 84100 ASSAY OF PHOSPHORUS: CPT | Performed by: STUDENT IN AN ORGANIZED HEALTH CARE EDUCATION/TRAINING PROGRAM

## 2017-05-27 PROCEDURE — 37000008 ZZH ANESTHESIA TECHNICAL FEE, 1ST 30 MIN: Performed by: STUDENT IN AN ORGANIZED HEALTH CARE EDUCATION/TRAINING PROGRAM

## 2017-05-27 PROCEDURE — 25000128 H RX IP 250 OP 636: Performed by: NURSE ANESTHETIST, CERTIFIED REGISTERED

## 2017-05-27 PROCEDURE — 80048 BASIC METABOLIC PNL TOTAL CA: CPT | Performed by: STUDENT IN AN ORGANIZED HEALTH CARE EDUCATION/TRAINING PROGRAM

## 2017-05-27 PROCEDURE — 86901 BLOOD TYPING SEROLOGIC RH(D): CPT | Performed by: ANESTHESIOLOGY

## 2017-05-27 PROCEDURE — 85730 THROMBOPLASTIN TIME PARTIAL: CPT | Performed by: ANESTHESIOLOGY

## 2017-05-27 PROCEDURE — 85025 COMPLETE CBC W/AUTO DIFF WBC: CPT | Performed by: SURGERY

## 2017-05-27 PROCEDURE — 83735 ASSAY OF MAGNESIUM: CPT | Performed by: STUDENT IN AN ORGANIZED HEALTH CARE EDUCATION/TRAINING PROGRAM

## 2017-05-27 PROCEDURE — 25000128 H RX IP 250 OP 636: Performed by: ANESTHESIOLOGY

## 2017-05-27 PROCEDURE — 25000132 ZZH RX MED GY IP 250 OP 250 PS 637: Mod: GY | Performed by: SURGERY

## 2017-05-27 PROCEDURE — 36000057 ZZH SURGERY LEVEL 3 1ST 30 MIN - UMMC: Performed by: STUDENT IN AN ORGANIZED HEALTH CARE EDUCATION/TRAINING PROGRAM

## 2017-05-27 PROCEDURE — 85610 PROTHROMBIN TIME: CPT | Performed by: ANESTHESIOLOGY

## 2017-05-27 PROCEDURE — 36592 COLLECT BLOOD FROM PICC: CPT | Performed by: ANESTHESIOLOGY

## 2017-05-27 PROCEDURE — 25000128 H RX IP 250 OP 636: Performed by: THORACIC SURGERY (CARDIOTHORACIC VASCULAR SURGERY)

## 2017-05-27 RX ORDER — SODIUM CHLORIDE, SODIUM LACTATE, POTASSIUM CHLORIDE, CALCIUM CHLORIDE 600; 310; 30; 20 MG/100ML; MG/100ML; MG/100ML; MG/100ML
INJECTION, SOLUTION INTRAVENOUS CONTINUOUS
Status: CANCELLED | OUTPATIENT
Start: 2017-05-27

## 2017-05-27 RX ORDER — LABETALOL HYDROCHLORIDE 5 MG/ML
10 INJECTION, SOLUTION INTRAVENOUS
Status: CANCELLED | OUTPATIENT
Start: 2017-05-27

## 2017-05-27 RX ORDER — PROPOFOL 10 MG/ML
INJECTION, EMULSION INTRAVENOUS CONTINUOUS PRN
Status: DISCONTINUED | OUTPATIENT
Start: 2017-05-27 | End: 2017-05-27

## 2017-05-27 RX ORDER — METOPROLOL TARTRATE 1 MG/ML
10 INJECTION, SOLUTION INTRAVENOUS EVERY 12 HOURS
Status: DISCONTINUED | OUTPATIENT
Start: 2017-05-28 | End: 2017-05-29

## 2017-05-27 RX ORDER — OXYCODONE HCL 5 MG/5 ML
15 SOLUTION, ORAL ORAL
Status: DISCONTINUED | OUTPATIENT
Start: 2017-05-27 | End: 2017-06-09

## 2017-05-27 RX ORDER — ONDANSETRON 2 MG/ML
4 INJECTION INTRAMUSCULAR; INTRAVENOUS EVERY 30 MIN PRN
Status: CANCELLED | OUTPATIENT
Start: 2017-05-27

## 2017-05-27 RX ORDER — TRAZODONE HYDROCHLORIDE 100 MG/1
100 TABLET ORAL AT BEDTIME
Status: DISCONTINUED | OUTPATIENT
Start: 2017-05-27 | End: 2017-06-29 | Stop reason: HOSPADM

## 2017-05-27 RX ORDER — FENTANYL CITRATE 50 UG/ML
25-50 INJECTION, SOLUTION INTRAMUSCULAR; INTRAVENOUS
Status: CANCELLED | OUTPATIENT
Start: 2017-05-27

## 2017-05-27 RX ORDER — PROPOFOL 10 MG/ML
INJECTION, EMULSION INTRAVENOUS PRN
Status: DISCONTINUED | OUTPATIENT
Start: 2017-05-27 | End: 2017-05-27

## 2017-05-27 RX ORDER — HYDROMORPHONE HYDROCHLORIDE 1 MG/ML
.3-.5 INJECTION, SOLUTION INTRAMUSCULAR; INTRAVENOUS; SUBCUTANEOUS
Status: DISCONTINUED | OUTPATIENT
Start: 2017-05-27 | End: 2017-05-27

## 2017-05-27 RX ORDER — SODIUM CHLORIDE, SODIUM LACTATE, POTASSIUM CHLORIDE, CALCIUM CHLORIDE 600; 310; 30; 20 MG/100ML; MG/100ML; MG/100ML; MG/100ML
INJECTION, SOLUTION INTRAVENOUS CONTINUOUS
Status: DISCONTINUED | OUTPATIENT
Start: 2017-05-27 | End: 2017-05-27 | Stop reason: HOSPADM

## 2017-05-27 RX ORDER — DIPHENHYDRAMINE HCL 25 MG
25 CAPSULE ORAL
Status: DISCONTINUED | OUTPATIENT
Start: 2017-05-27 | End: 2017-06-16

## 2017-05-27 RX ORDER — HYDROMORPHONE HYDROCHLORIDE 1 MG/ML
.3-.5 INJECTION, SOLUTION INTRAMUSCULAR; INTRAVENOUS; SUBCUTANEOUS
Status: DISCONTINUED | OUTPATIENT
Start: 2017-05-27 | End: 2017-06-07

## 2017-05-27 RX ORDER — ONDANSETRON 4 MG/1
4 TABLET, ORALLY DISINTEGRATING ORAL EVERY 30 MIN PRN
Status: CANCELLED | OUTPATIENT
Start: 2017-05-27

## 2017-05-27 RX ADMIN — PROPOFOL 30 MG: 10 INJECTION, EMULSION INTRAVENOUS at 11:24

## 2017-05-27 RX ADMIN — OXYCODONE HYDROCHLORIDE 10 MG: 5 SOLUTION ORAL at 04:04

## 2017-05-27 RX ADMIN — GABAPENTIN 300 MG: 250 SOLUTION ORAL at 20:04

## 2017-05-27 RX ADMIN — MIDAZOLAM HYDROCHLORIDE 1 MG: 1 INJECTION, SOLUTION INTRAMUSCULAR; INTRAVENOUS at 11:14

## 2017-05-27 RX ADMIN — PIPERACILLIN SODIUM,TAZOBACTAM SODIUM 3.38 G: 3; .375 INJECTION, POWDER, FOR SOLUTION INTRAVENOUS at 19:59

## 2017-05-27 RX ADMIN — GABAPENTIN 300 MG: 250 SOLUTION ORAL at 14:20

## 2017-05-27 RX ADMIN — TRAZODONE HYDROCHLORIDE 100 MG: 100 TABLET ORAL at 21:11

## 2017-05-27 RX ADMIN — PROPOFOL 50 MCG/KG/MIN: 10 INJECTION, EMULSION INTRAVENOUS at 11:24

## 2017-05-27 RX ADMIN — GABAPENTIN 300 MG: 250 SOLUTION ORAL at 04:04

## 2017-05-27 RX ADMIN — LOPERAMIDE HYDROCHLORIDE 4 MG: 1 SOLUTION ORAL at 20:04

## 2017-05-27 RX ADMIN — Medication 10 ML: at 14:19

## 2017-05-27 RX ADMIN — Medication 10 ML: at 16:00

## 2017-05-27 RX ADMIN — ACETAMINOPHEN 975 MG: 160 SUSPENSION ORAL at 21:11

## 2017-05-27 RX ADMIN — METOPROLOL TARTRATE 25 MG: 100 TABLET ORAL at 20:04

## 2017-05-27 RX ADMIN — PIPERACILLIN SODIUM,TAZOBACTAM SODIUM 3.38 G: 3; .375 INJECTION, POWDER, FOR SOLUTION INTRAVENOUS at 00:19

## 2017-05-27 RX ADMIN — OXYCODONE HYDROCHLORIDE 10 MG: 5 SOLUTION ORAL at 08:50

## 2017-05-27 RX ADMIN — Medication 6 MG: at 03:22

## 2017-05-27 RX ADMIN — HEPARIN SODIUM 5000 UNITS: 5000 INJECTION, SOLUTION INTRAVENOUS; SUBCUTANEOUS at 21:11

## 2017-05-27 RX ADMIN — PANTOPRAZOLE SODIUM 40 MG: 40 TABLET, DELAYED RELEASE ORAL at 09:06

## 2017-05-27 RX ADMIN — OXYCODONE HYDROCHLORIDE 15 MG: 5 SOLUTION ORAL at 18:17

## 2017-05-27 RX ADMIN — MINERAL SUPPLEMENT IRON 300 MG / 5 ML STRENGTH LIQUID 100 PER BOX UNFLAVORED 300 MG: at 09:46

## 2017-05-27 RX ADMIN — ACETAMINOPHEN 975 MG: 160 SUSPENSION ORAL at 14:18

## 2017-05-27 RX ADMIN — LOPERAMIDE HYDROCHLORIDE 4 MG: 1 SOLUTION ORAL at 16:00

## 2017-05-27 RX ADMIN — METOPROLOL TARTRATE 25 MG: 100 TABLET ORAL at 09:08

## 2017-05-27 RX ADMIN — HYDROMORPHONE HYDROCHLORIDE 0.5 MG: 1 INJECTION, SOLUTION INTRAMUSCULAR; INTRAVENOUS; SUBCUTANEOUS at 16:14

## 2017-05-27 RX ADMIN — MULTIPLE VITAMINS W/ MINERALS TAB 1 TABLET: TAB at 09:38

## 2017-05-27 RX ADMIN — SODIUM CHLORIDE, POTASSIUM CHLORIDE, SODIUM LACTATE AND CALCIUM CHLORIDE: 600; 310; 30; 20 INJECTION, SOLUTION INTRAVENOUS at 11:12

## 2017-05-27 RX ADMIN — HYDROMORPHONE HYDROCHLORIDE 0.5 MG: 1 INJECTION, SOLUTION INTRAMUSCULAR; INTRAVENOUS; SUBCUTANEOUS at 22:14

## 2017-05-27 RX ADMIN — Medication 6 MG: at 20:04

## 2017-05-27 RX ADMIN — POTASSIUM CHLORIDE 40 MEQ: 40 SOLUTION ORAL at 09:45

## 2017-05-27 RX ADMIN — ACETAMINOPHEN 975 MG: 160 SUSPENSION ORAL at 04:04

## 2017-05-27 RX ADMIN — Medication 6 MG: at 09:06

## 2017-05-27 RX ADMIN — OXYCODONE HYDROCHLORIDE 15 MG: 5 SOLUTION ORAL at 14:18

## 2017-05-27 RX ADMIN — PIPERACILLIN SODIUM,TAZOBACTAM SODIUM 3.38 G: 3; .375 INJECTION, POWDER, FOR SOLUTION INTRAVENOUS at 07:41

## 2017-05-27 RX ADMIN — Medication 10 ML: at 09:07

## 2017-05-27 RX ADMIN — CHOLESTYRAMINE 4 G: 4 POWDER, FOR SUSPENSION ORAL at 09:05

## 2017-05-27 RX ADMIN — OXYCODONE HYDROCHLORIDE 5 MG: 5 SOLUTION ORAL at 08:50

## 2017-05-27 RX ADMIN — LOPERAMIDE HYDROCHLORIDE 4 MG: 1 SOLUTION ORAL at 09:06

## 2017-05-27 RX ADMIN — FLUCONAZOLE 200 MG: 2 INJECTION INTRAVENOUS at 21:23

## 2017-05-27 RX ADMIN — PIPERACILLIN SODIUM,TAZOBACTAM SODIUM 3.38 G: 3; .375 INJECTION, POWDER, FOR SOLUTION INTRAVENOUS at 14:21

## 2017-05-27 RX ADMIN — HEPARIN SODIUM 5000 UNITS: 5000 INJECTION, SOLUTION INTRAVENOUS; SUBCUTANEOUS at 04:04

## 2017-05-27 RX ADMIN — HEPARIN SODIUM 5000 UNITS: 5000 INJECTION, SOLUTION INTRAVENOUS; SUBCUTANEOUS at 14:20

## 2017-05-27 RX ADMIN — OXYCODONE HYDROCHLORIDE 15 MG: 5 SOLUTION ORAL at 21:12

## 2017-05-27 RX ADMIN — Medication 10 ML: at 20:04

## 2017-05-27 RX ADMIN — OXYCODONE HYDROCHLORIDE 10 MG: 5 SOLUTION ORAL at 00:19

## 2017-05-27 RX ADMIN — LOPERAMIDE HYDROCHLORIDE 4 MG: 1 SOLUTION ORAL at 14:18

## 2017-05-27 RX ADMIN — Medication 6 MG: at 14:19

## 2017-05-27 ASSESSMENT — ENCOUNTER SYMPTOMS
DYSRHYTHMIAS: 1
DYSRHYTHMIAS: 1

## 2017-05-27 ASSESSMENT — PAIN DESCRIPTION - DESCRIPTORS: DESCRIPTORS: ACHING

## 2017-05-27 NOTE — OP NOTE
DATE OF PROCEDURE:  2016.      PREOPERATIVE DIAGNOSIS:  Open chest wound, status post anastomotic leak after esophagectomy.      POSTOPERATIVE DIAGNOSIS:  Open chest wound, status post anastomotic leak after esophagectomy.      PROCEDURE PERFORMED:  Chest wound washout and dressing change.      DESCRIPTION OF PROCEDURE:  After obtaining informed consent, Favian Blanco was brought to the operating room and laid supine on the operating table with the head up to avoid aspiration.  The packing from yesterday was removed.  The cavity was then washed with warm saline.  There was still some contamination but the wound seems to be a little less purulent than it was.  After thoroughly washing out the cavity, it was then repacked with Kerlix gauze again.  The procedure went up was done with deep sedation and monitored anesthesia care.  The patient tolerated the procedure well.         CONCHIS MARRERO MD             D: 2017 12:00   T: 2017 09:08   MT: MYRNA      Name:     FAVIAN BLANCO   MRN:      [id number]        Account:        [id number]   :      1945           Procedure Date: 2017      Document: [id number]

## 2017-05-27 NOTE — ANESTHESIA PREPROCEDURE EVALUATION
Anesthesia Evaluation     . Pt has had prior anesthetic. Type: General and MAC    No history of anesthetic complications          ROS/MED HX    ENT/Pulmonary:  - neg pulmonary ROS     Neurologic: Comment: Meniere's Disease     (+)Multiple Sclerosis     Cardiovascular:  - neg cardiovascular ROS   (+) ----. Taking blood thinners : . . . :. dysrhythmias a-fib, . 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 pull up complicated by esophageal leak and mediastinal abscess s/p serial washouts under MAC; hiatal hernia; IBS    (+) GERD       Renal/Genitourinary:  - ROS Renal section negative       Endo:  - neg endo ROS       Psychiatric:  - neg psychiatric ROS       Infectious Disease: Comment: See GI         Malignancy:   (+) Malignancy History of Breast          Other: Comment: Fibromyalgia                     Physical Exam  Normal systems: cardiovascular and pulmonary    Airway   Mallampati: II  TM distance: >3 FB  Neck ROM: full    Dental     Cardiovascular       Pulmonary     Other findings: Awake, alert, appropriate with normal respiratory pattern.    Lungs clear  CVS  RRR with no murmur.  SKin warm and dry.  MM moist    Byron Rodriguez. MD                 Lab Results   Component Value Date    WBC 13.4 (H) 05/27/2017    WBC 10.4 05/26/2017    WBC 7.7 05/25/2017    HGB 7.0 (L) 05/27/2017    HGB 7.0 (L) 05/26/2017    HGB 7.2 (L) 05/25/2017    HCT 23.0 (L) 05/27/2017    HCT 23.8 (L) 05/26/2017    HCT 23.1 (L) 05/25/2017     (H) 05/27/2017     (H) 05/26/2017     (H) 05/25/2017     (H) 05/27/2017     05/26/2017     05/25/2017    POTASSIUM 3.5 05/27/2017    POTASSIUM 4.2 05/26/2017     POTASSIUM 2.9 (L) 2017    CHLORIDE 114 (H) 2017    CHLORIDE 112 (H) 2017    CHLORIDE 110 (H) 2017    CO2 22 2017    CO2 24 2017    CO2 24 2017    BUN 2 (L) 2017    BUN 2 (L) 2017    BUN 3 (L) 2017    CR 0.43 (L) 2017    CR 0.36 (L) 2017    CR 0.45 (L) 2017    GLC 76 2017    GLC 78 2017    GLC 82 2017    TROPI 0.022 2017    TROPI 0.195 (HH) 2017    TROPI 0.283 (HH) 2017    AST 38 2017    AST 26 2017    AST 47 (H) 2017    ALT 56 (H) 2017    ALT 28 2017    ALT 71 (H) 2017    ALKPHOS 283 (H) 2017    ALKPHOS 164 (H) 2017    ALKPHOS 391 (H) 2017    BILITOTAL 0.8 2017    BILITOTAL 0.2 2017    BILITOTAL 0.2 2017    INR 1.43 (H) 2017    INR 1.33 (H) 2017    INR 1.87 (H) 2017       [facility],[place]  Echocardiography Laboratory  [address] [place] [id number]     Name: FAVIAN MALONE  MRN: [id number]  : 1945  Study Date: 2017 09:21 AM  Age: 71 yrs  Gender: Female  Patient Location: [facility]  Reason For Study: CHF  Ordering Physician: FRANCIS BELL  Performed By: Alfie Asher RDCS     BSA: 1.4 m2  Height: 60 in  Weight: 100 lb  HR: 85  BP: 113/72 mmHg  ______________________________________________________________________________        Procedure  Complete Portable Echo Adult. Contrast Lumason. Technically difficult study.  Lumason (NDC #9599-0711-98) given intravenously. 0 ml wasted.  ______________________________________________________________________________     Interpretation Summary  Technically difficult study.  Global and regional left ventricular function is hyperdynamic with an LVEF  >70%.  The right ventricular function is hyperdynamic.  Mild pulmonary hypertension is present.  The inferior vena cava is normal. The estimated mean right atrial pressure  is  <3 mmHg.  No pericardial effusion is present.  Previous study not available for comparison.  ______________________________________________________________________________           Left Ventricle  Left ventricular size is normal. Left ventricular wall thickness is normal.  Global and regional left ventricular function is hyperkinetic with an EF  >70%. Normal left ventricular filling for age. No regional wall motion  abnormalities are seen.     Right Ventricle  The right ventricle is normal size. The right ventricle is hyperdynamic.  Atria  Mild biatrial enlargement is present.     Mitral Valve  Mild mitral annular calcification is present.     Aortic Valve  Mild aortic valve sclerosis is present.     Tricuspid Valve  The tricuspid valve is normal. Trace to mild tricuspid insufficiency is  present. Mild pulmonary hypertension is present. Right ventricular systolic  pressure is 30mmHg above the right atrial pressure.     Pulmonic Valve  The pulmonic valve cannot be assessed.     Vessels  The aorta root is normal. The inferior vena cava is normal. Estimated mean  right atrial pressure is <3 mmHg.  Pericardium  No pericardial effusion is present.     Compared to Previous Study  Previous study not available for comparison.     ______________________________________________________________________________  MMode/2D Measurements & Calculations  IVSd: 0.82 cm  LVIDd: 3.1 cm  LVIDs: 2.0 cm  LVPWd: 0.83 cm  FS: 36.5 %  EDV(Teich): 37.9 ml  ESV(Teich): 12.2 ml  LV mass(C)d: 65.1 grams  Ao root diam: 3.0 cm  LA Volume (BP): 38.0 ml     LA Volume Index (BP): 27.3 ml/m2        Doppler Measurements & Calculations  MV E max jorge: 98.7 cm/sec  MV A max jorge: 101.0 cm/sec  MV E/A: 0.98  MV dec time: 0.25 sec  PA acc time: 0.09 sec  TR max jorge: 274.0 cm/sec  TR max P.0 mmHg  Lateral E/e': 7.6  Medial E/e': 11.5                  Anesthesia Plan      History & Physical Review  History and physical reviewed and following  examination; no interval change.    ASA Status:  3 .    NPO Status:  > 8 hours    Plan for MAC with Intravenous induction. Maintenance will be TIVA.  Reason for MAC:  Deep or markedly invasive procedure (G8)  PONV prophylaxis:  Ondansetron (or other 5HT-3)     I have seen the patient and reviewed the chart.  I have discussed the patient with the Dr. Gee.  Plan MAC with routine monitors.    Byron Rodriguez MD          Postoperative Care  Postoperative pain management:  IV analgesics.      Consents  Anesthetic plan, risks, benefits and alternatives discussed with:  Patient..

## 2017-05-27 NOTE — PLAN OF CARE
Problem: Goal Outcome Summary  Goal: Goal Outcome Summary  Outcome: No Change  To OR approximately 1015 for wash-out of chest wounds. Returned approximately 1300. Very sleepy. Chest dressing clean, dry and intact. Medicated x2 this shift with Oxycodone 15mg, with decrease in pain. Lung sounds clear but diminished. Continues to have loose stools. Stool specimen sent in OR for C. Diff. Pt up to commode independently. Pt turned TF off during night. Restarted after returning from OR.  here x2 this shift-very supportive.

## 2017-05-27 NOTE — ANESTHESIA CARE TRANSFER NOTE
Patient: Minerva Blanco    Procedure(s):  Chest washout - Wound Class: I-Clean    Diagnosis: chest Wound  Diagnosis Additional Information: No value filed.    Anesthesia Type:   MAC     Note:    Patient transferred to:Medical/Surgical Unit  Comments: Pt is awake, VS stable, RA O2Sat 100%. Report given RN .      Vitals: (Last set prior to Anesthesia Care Transfer)    CRNA VITALS  5/27/2017 1116 - 5/27/2017 1146      5/27/2017             Pulse: 58    Ht Rate: 57    SpO2: 100 %    Resp Rate (set): 10    EKG: Sinus rhythm                Electronically Signed By: Clover Aranda  May 27, 2017  11:46 AM

## 2017-05-27 NOTE — PLAN OF CARE
Problem: Individualization  Goal: Patient Preferences  Outcome: No Change  Tube feeding was at 20 ml/hr, continuous watery stool, stopped tube feeding at 0200 per patient request. Receiving opium per schedule. Oxycodone 10mg every 3 hours prn and 10mg per scheduled. K at 4.2 Reinforcing mid chest wound dressing. Sat is 97%RA. Remain NPO. Denying any nausea. Voiding adequately. Continue with plan of care.

## 2017-05-27 NOTE — ANESTHESIA POSTPROCEDURE EVALUATION
Patient: Minerva Blanco    Procedure(s):  Chest washout - Wound Class: I-Clean    Diagnosis:chest Wound  Diagnosis Additional Information: No value filed.    Anesthesia Type:  MAC    Note:  Anesthesia Post Evaluation    Patient location during evaluation: Floor  Patient participation: Able to fully participate in evaluation  Level of consciousness: awake  Pain management: satisfactory to patient  Airway patency: patent  Cardiovascular status: acceptable  Respiratory status: acceptable  Hydration status: acceptable  PONV: none     Anesthetic complications: None          Last vitals:  Vitals:    05/26/17 1925 05/27/17 0012 05/27/17 0833   BP: 135/58 136/59 143/65   Pulse:   72   Resp: 18 18 16   Temp: 36.1  C (96.9  F) 35.8  C (96.4  F) 35.2  C (95.4  F)   SpO2: 99% 97% 97%         Electronically Signed By: Mike Ingram MD  May 27, 2017  11:52 AM

## 2017-05-27 NOTE — PROGRESS NOTES
Thoracic surgery progress note    No acute events overnight. Pain poorly controlled. Continued to have loose stools - tube feeds not off til 3am    Temp: 95.5  F (35.3  C) Temp  Min: 95.3  F (35.2  C)  Max: 96.9  F (36.1  C)  Resp: 20 Resp  Min: 16  Max: 20  SpO2: 97 % SpO2  Min: 91 %  Max: 99 %  Pulse: 57 Pulse  Min: 55  Max: 72  Heart Rate: 64 Heart Rate  Min: 58  Max: 75  BP: 138/61 Systolic (24hrs), Av , Min:135 , Max:146   Diastolic (24hrs), Av, Min:54, Max:75    A&O, NAD  Unlabored breathing  Dressing saturated  Chest tube with seropurulent drainage  RRR    I&O  Chest tube 25/30    72F w/ AFib on Coumadin and chronic esophageal perforation and mediastinal abscess cavity s/p gastric pullup c/b recurrent esophageal leak and mediastinal abscess s/p serial washouts     - Pilar 15mg Q3H Oxy. Wean off as able. IV Dilaudid PRN  - Strict NPO.   - Cycle tube feeds DURING THE DAY. NO TUBE FEEDS FROM 11PM - 7AM.   - Anti-motility agents. CDiff pending  - Chest tube to gravity  - Daily OR washouts under MAC as a bridge to VAC therapy  - C/w Zosyn/Fluc ( - )  - Heparin SQ     Seen w/ staff, Dr Estevan Liu MD  Surgery PGY1  [id number]

## 2017-05-27 NOTE — PLAN OF CARE
Problem: Goal Outcome Summary  Goal: Goal Outcome Summary  OT/7B: Cancel. Attempted pt this AM, pt in OR for dressing change, attempted again this PM pt significantly fatigued and sound asleep. Will reschedule for tomorrow.

## 2017-05-27 NOTE — ANESTHESIA PREPROCEDURE EVALUATION
Anesthesia Evaluation     . Pt has had prior anesthetic. Type: General and MAC    No history of anesthetic complications          ROS/MED HX    ENT/Pulmonary:  - neg pulmonary ROS     Neurologic: Comment: Meniere's Disease     (+)Multiple Sclerosis     Cardiovascular:  - neg cardiovascular ROS   (+) ----. Taking blood thinners : . . . :. dysrhythmias a-fib, . 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 pull up complicated by esophageal leak and mediastinal abscess s/p serial washouts under MAC; hiatal hernia; IBS    (+) GERD       Renal/Genitourinary:  - ROS Renal section negative       Endo:  - neg endo ROS       Psychiatric:  - neg psychiatric ROS       Infectious Disease: Comment: See GI         Malignancy:   (+) Malignancy History of Breast          Other: Comment: Fibromyalgia                     Physical Exam  Normal systems: dental    Airway   Mallampati: III  TM distance: <3 FB  Neck ROM: limited    Dental     Cardiovascular   Rhythm and rate: regular      Pulmonary    breath sounds clear to auscultation                      Lab Results   Component Value Date    WBC 10.4 05/26/2017    WBC 7.7 05/25/2017    WBC 10.6 05/24/2017    HGB 7.0 (L) 05/26/2017    HGB 7.2 (L) 05/25/2017    HGB 7.0 (L) 05/24/2017    HCT 23.8 (L) 05/26/2017    HCT 23.1 (L) 05/25/2017    HCT 22.6 (L) 05/24/2017     (H) 05/26/2017     (H) 05/25/2017     (H) 05/24/2017     05/26/2017     05/25/2017     05/24/2017    POTASSIUM 4.2 05/26/2017    POTASSIUM 2.9 (L) 05/26/2017    POTASSIUM 4.4 05/25/2017    CHLORIDE 112 (H) 05/26/2017    CHLORIDE 110 (H) 05/25/2017    CHLORIDE 106 05/24/2017    CO2 24  2017    CO2 24 2017    CO2 24 2017    BUN 2 (L) 2017    BUN 3 (L) 2017    BUN 3 (L) 2017    CR 0.36 (L) 2017    CR 0.45 (L) 2017    CR 0.46 (L) 2017    GLC 78 2017    GLC 82 2017    GLC 70 2017    TROPI 0.022 2017    TROPI 0.195 (HH) 2017    TROPI 0.283 (HH) 2017    AST 38 2017    AST 26 2017    AST 47 (H) 2017    ALT 56 (H) 2017    ALT 28 2017    ALT 71 (H) 2017    ALKPHOS 283 (H) 2017    ALKPHOS 164 (H) 2017    ALKPHOS 391 (H) 2017    BILITOTAL 0.8 2017    BILITOTAL 0.2 2017    BILITOTAL 0.2 2017    INR 1.33 (H) 2017    INR 1.87 (H) 2017    INR 2.64 (H) 2017     [facility],[place]  Echocardiography Laboratory  [address] [place], [place] [id number]     Name: FAVIAN MALONE  MRN: [id number]  : 1945  Study Date: 2017 09:21 AM  Age: 71 yrs  Gender: Female  Patient Location: [facility]  Reason For Study: CHF  Ordering Physician: FRANCIS BELL  Performed By: Alfie Asher RDCS     BSA: 1.4 m2  Height: 60 in  Weight: 100 lb  HR: 85  BP: 113/72 mmHg  ______________________________________________________________________________        Procedure  Complete Portable Echo Adult. Contrast Lumason. Technically difficult study.  Lumason (NDC #1212-6080-04) given intravenously. 0 ml wasted.  ______________________________________________________________________________     Interpretation Summary  Technically difficult study.  Global and regional left ventricular function is hyperdynamic with an LVEF  >70%.  The right ventricular function is hyperdynamic.  Mild pulmonary hypertension is present.  The inferior vena cava is normal. The estimated mean right atrial pressure is  <3 mmHg.  No pericardial effusion is present.  Previous study not available for  comparison.  ______________________________________________________________________________           Left Ventricle  Left ventricular size is normal. Left ventricular wall thickness is normal.  Global and regional left ventricular function is hyperkinetic with an EF  >70%. Normal left ventricular filling for age. No regional wall motion  abnormalities are seen.     Right Ventricle  The right ventricle is normal size. The right ventricle is hyperdynamic.  Atria  Mild biatrial enlargement is present.     Mitral Valve  Mild mitral annular calcification is present.     Aortic Valve  Mild aortic valve sclerosis is present.     Tricuspid Valve  The tricuspid valve is normal. Trace to mild tricuspid insufficiency is  present. Mild pulmonary hypertension is present. Right ventricular systolic  pressure is 30mmHg above the right atrial pressure.     Pulmonic Valve  The pulmonic valve cannot be assessed.     Vessels  The aorta root is normal. The inferior vena cava is normal. Estimated mean  right atrial pressure is <3 mmHg.  Pericardium  No pericardial effusion is present.     Compared to Previous Study  Previous study not available for comparison.     ______________________________________________________________________________  MMode/2D Measurements & Calculations  IVSd: 0.82 cm  LVIDd: 3.1 cm  LVIDs: 2.0 cm  LVPWd: 0.83 cm  FS: 36.5 %  EDV(Teich): 37.9 ml  ESV(Teich): 12.2 ml  LV mass(C)d: 65.1 grams  Ao root diam: 3.0 cm  LA Volume (BP): 38.0 ml     LA Volume Index (BP): 27.3 ml/m2        Doppler Measurements & Calculations  MV E max jorge: 98.7 cm/sec  MV A max jorge: 101.0 cm/sec  MV E/A: 0.98  MV dec time: 0.25 sec  PA acc time: 0.09 sec  TR max jorge: 274.0 cm/sec  TR max P.0 mmHg  Lateral E/e': 7.6  Medial E/e': 11.5     Anesthesia Plan      History & Physical Review  History and physical reviewed and following examination; no interval change.    ASA Status:  3 .    NPO Status:  > 6 hours    Plan for MAC with  Intravenous induction. Maintenance will be TIVA.  Reason for MAC:  Deep or markedly invasive procedure (G8)  PONV prophylaxis:  Ondansetron (or other 5HT-3)    -Previous cases done under MAC with precedex 0.7 mg/kg/hr  -Patient with left 20 g peripheral IV and left double lumen PICC    Rufina Sarah MD  CA 2 Anesthesia Resident   Pager [phone]           Postoperative Care  Postoperative pain management:  IV analgesics.      Consents  Anesthetic plan, risks, benefits and alternatives discussed with:  Patient..

## 2017-05-28 ENCOUNTER — APPOINTMENT (OUTPATIENT)
Dept: ULTRASOUND IMAGING | Facility: CLINIC | Age: 72
DRG: 856 | End: 2017-05-28
Attending: STUDENT IN AN ORGANIZED HEALTH CARE EDUCATION/TRAINING PROGRAM
Payer: MEDICARE

## 2017-05-28 ENCOUNTER — APPOINTMENT (OUTPATIENT)
Dept: GENERAL RADIOLOGY | Facility: CLINIC | Age: 72
DRG: 856 | End: 2017-05-28
Attending: STUDENT IN AN ORGANIZED HEALTH CARE EDUCATION/TRAINING PROGRAM
Payer: MEDICARE

## 2017-05-28 ENCOUNTER — ANESTHESIA (OUTPATIENT)
Dept: SURGERY | Facility: CLINIC | Age: 72
DRG: 856 | End: 2017-05-28
Payer: MEDICARE

## 2017-05-28 ENCOUNTER — APPOINTMENT (OUTPATIENT)
Dept: OCCUPATIONAL THERAPY | Facility: CLINIC | Age: 72
DRG: 856 | End: 2017-05-28
Attending: STUDENT IN AN ORGANIZED HEALTH CARE EDUCATION/TRAINING PROGRAM
Payer: MEDICARE

## 2017-05-28 ENCOUNTER — APPOINTMENT (OUTPATIENT)
Dept: INTERVENTIONAL RADIOLOGY/VASCULAR | Facility: CLINIC | Age: 72
DRG: 856 | End: 2017-05-28
Attending: STUDENT IN AN ORGANIZED HEALTH CARE EDUCATION/TRAINING PROGRAM
Payer: MEDICARE

## 2017-05-28 LAB
ANION GAP SERPL CALCULATED.3IONS-SCNC: 6 MMOL/L (ref 3–14)
BUN SERPL-MCNC: 2 MG/DL (ref 7–30)
CALCIUM SERPL-MCNC: 8 MG/DL (ref 8.5–10.1)
CHLORIDE SERPL-SCNC: 114 MMOL/L (ref 94–109)
CO2 SERPL-SCNC: 26 MMOL/L (ref 20–32)
CREAT SERPL-MCNC: 0.43 MG/DL (ref 0.52–1.04)
GFR SERPL CREATININE-BSD FRML MDRD: ABNORMAL ML/MIN/1.7M2
GLUCOSE SERPL-MCNC: 76 MG/DL (ref 70–99)
HGB BLD-MCNC: 7.3 G/DL (ref 11.7–15.7)
MAGNESIUM SERPL-MCNC: 1.7 MG/DL (ref 1.6–2.3)
PHOSPHATE SERPL-MCNC: 2.6 MG/DL (ref 2.5–4.5)
PLATELET # BLD AUTO: 568 10E9/L (ref 150–450)
POTASSIUM SERPL-SCNC: 3.6 MMOL/L (ref 3.4–5.3)
SODIUM SERPL-SCNC: 146 MMOL/L (ref 133–144)

## 2017-05-28 PROCEDURE — 25000128 H RX IP 250 OP 636: Performed by: ANESTHESIOLOGY

## 2017-05-28 PROCEDURE — 40000802 ZZH SITE CHECK

## 2017-05-28 PROCEDURE — 37000009 ZZH ANESTHESIA TECHNICAL FEE, EACH ADDTL 15 MIN: Performed by: STUDENT IN AN ORGANIZED HEALTH CARE EDUCATION/TRAINING PROGRAM

## 2017-05-28 PROCEDURE — C1887 CATHETER, GUIDING: HCPCS

## 2017-05-28 PROCEDURE — 40000170 ZZH STATISTIC PRE-PROCEDURE ASSESSMENT II: Performed by: STUDENT IN AN ORGANIZED HEALTH CARE EDUCATION/TRAINING PROGRAM

## 2017-05-28 PROCEDURE — 0D2DXUZ CHANGE FEEDING DEVICE IN LOWER INTESTINAL TRACT, EXTERNAL APPROACH: ICD-10-PCS | Performed by: RADIOLOGY

## 2017-05-28 PROCEDURE — 27210905 ZZH KIT CR7

## 2017-05-28 PROCEDURE — 27210995 ZZH RX 272

## 2017-05-28 PROCEDURE — 25000125 ZZHC RX 250

## 2017-05-28 PROCEDURE — 37000008 ZZH ANESTHESIA TECHNICAL FEE, 1ST 30 MIN: Performed by: STUDENT IN AN ORGANIZED HEALTH CARE EDUCATION/TRAINING PROGRAM

## 2017-05-28 PROCEDURE — 36000059 ZZH SURGERY LEVEL 3 EA 15 ADDTL MIN UMMC: Performed by: STUDENT IN AN ORGANIZED HEALTH CARE EDUCATION/TRAINING PROGRAM

## 2017-05-28 PROCEDURE — 83735 ASSAY OF MAGNESIUM: CPT | Performed by: STUDENT IN AN ORGANIZED HEALTH CARE EDUCATION/TRAINING PROGRAM

## 2017-05-28 PROCEDURE — 85049 AUTOMATED PLATELET COUNT: CPT | Performed by: THORACIC SURGERY (CARDIOTHORACIC VASCULAR SURGERY)

## 2017-05-28 PROCEDURE — 25000128 H RX IP 250 OP 636: Performed by: STUDENT IN AN ORGANIZED HEALTH CARE EDUCATION/TRAINING PROGRAM

## 2017-05-28 PROCEDURE — 84100 ASSAY OF PHOSPHORUS: CPT | Performed by: STUDENT IN AN ORGANIZED HEALTH CARE EDUCATION/TRAINING PROGRAM

## 2017-05-28 PROCEDURE — 27210738 ZZH ACCESSORY CR2

## 2017-05-28 PROCEDURE — 93971 EXTREMITY STUDY: CPT | Mod: LT

## 2017-05-28 PROCEDURE — 0JD60ZZ EXTRACTION OF CHEST SUBCUTANEOUS TISSUE AND FASCIA, OPEN APPROACH: ICD-10-PCS | Performed by: STUDENT IN AN ORGANIZED HEALTH CARE EDUCATION/TRAINING PROGRAM

## 2017-05-28 PROCEDURE — 25000125 ZZHC RX 250: Performed by: RADIOLOGY

## 2017-05-28 PROCEDURE — 12000003 ZZH R&B CRITICAL UMMC

## 2017-05-28 PROCEDURE — A9270 NON-COVERED ITEM OR SERVICE: HCPCS | Mod: GY | Performed by: SURGERY

## 2017-05-28 PROCEDURE — 25000132 ZZH RX MED GY IP 250 OP 250 PS 637: Mod: GY | Performed by: STUDENT IN AN ORGANIZED HEALTH CARE EDUCATION/TRAINING PROGRAM

## 2017-05-28 PROCEDURE — 25000125 ZZHC RX 250: Performed by: ANESTHESIOLOGY

## 2017-05-28 PROCEDURE — 97535 SELF CARE MNGMENT TRAINING: CPT | Mod: GO

## 2017-05-28 PROCEDURE — 25000128 H RX IP 250 OP 636: Performed by: RADIOLOGY

## 2017-05-28 PROCEDURE — 25000128 H RX IP 250 OP 636: Performed by: THORACIC SURGERY (CARDIOTHORACIC VASCULAR SURGERY)

## 2017-05-28 PROCEDURE — 85018 HEMOGLOBIN: CPT | Performed by: THORACIC SURGERY (CARDIOTHORACIC VASCULAR SURGERY)

## 2017-05-28 PROCEDURE — 80048 BASIC METABOLIC PNL TOTAL CA: CPT | Performed by: STUDENT IN AN ORGANIZED HEALTH CARE EDUCATION/TRAINING PROGRAM

## 2017-05-28 PROCEDURE — 36592 COLLECT BLOOD FROM PICC: CPT | Performed by: THORACIC SURGERY (CARDIOTHORACIC VASCULAR SURGERY)

## 2017-05-28 PROCEDURE — 27210794 ZZH OR GENERAL SUPPLY STERILE: Performed by: STUDENT IN AN ORGANIZED HEALTH CARE EDUCATION/TRAINING PROGRAM

## 2017-05-28 PROCEDURE — 25000132 ZZH RX MED GY IP 250 OP 250 PS 637: Mod: GY | Performed by: SURGERY

## 2017-05-28 PROCEDURE — 74000 XR ABDOMEN PORT F1 VW: CPT

## 2017-05-28 PROCEDURE — 49451 REPLACE DUOD/JEJ TUBE PERC: CPT

## 2017-05-28 PROCEDURE — 99152 MOD SED SAME PHYS/QHP 5/>YRS: CPT

## 2017-05-28 PROCEDURE — 25000125 ZZHC RX 250: Performed by: STUDENT IN AN ORGANIZED HEALTH CARE EDUCATION/TRAINING PROGRAM

## 2017-05-28 PROCEDURE — 27211065 ZZ H TUBE GASTRO CR10

## 2017-05-28 PROCEDURE — 36000057 ZZH SURGERY LEVEL 3 1ST 30 MIN - UMMC: Performed by: STUDENT IN AN ORGANIZED HEALTH CARE EDUCATION/TRAINING PROGRAM

## 2017-05-28 PROCEDURE — 40000893 ZZH STATISTIC PT IP EVAL DEFER

## 2017-05-28 PROCEDURE — 40000133 ZZH STATISTIC OT WARD VISIT

## 2017-05-28 PROCEDURE — A9270 NON-COVERED ITEM OR SERVICE: HCPCS | Mod: GY | Performed by: STUDENT IN AN ORGANIZED HEALTH CARE EDUCATION/TRAINING PROGRAM

## 2017-05-28 PROCEDURE — C1769 GUIDE WIRE: HCPCS

## 2017-05-28 PROCEDURE — 36592 COLLECT BLOOD FROM PICC: CPT | Performed by: STUDENT IN AN ORGANIZED HEALTH CARE EDUCATION/TRAINING PROGRAM

## 2017-05-28 PROCEDURE — 27210995 ZZH RX 272: Performed by: RADIOLOGY

## 2017-05-28 RX ORDER — LIDOCAINE 40 MG/G
CREAM TOPICAL
Status: DISCONTINUED | OUTPATIENT
Start: 2017-05-28 | End: 2017-06-13

## 2017-05-28 RX ORDER — SODIUM CHLORIDE, SODIUM LACTATE, POTASSIUM CHLORIDE, CALCIUM CHLORIDE 600; 310; 30; 20 MG/100ML; MG/100ML; MG/100ML; MG/100ML
INJECTION, SOLUTION INTRAVENOUS CONTINUOUS
Status: DISCONTINUED | OUTPATIENT
Start: 2017-05-28 | End: 2017-05-28 | Stop reason: HOSPADM

## 2017-05-28 RX ORDER — PROPOFOL 10 MG/ML
INJECTION, EMULSION INTRAVENOUS PRN
Status: DISCONTINUED | OUTPATIENT
Start: 2017-05-28 | End: 2017-05-28

## 2017-05-28 RX ORDER — FENTANYL CITRATE 50 UG/ML
INJECTION, SOLUTION INTRAMUSCULAR; INTRAVENOUS PRN
Status: DISCONTINUED | OUTPATIENT
Start: 2017-05-28 | End: 2017-05-28

## 2017-05-28 RX ORDER — HEPARIN SODIUM 10000 [USP'U]/100ML
0-3500 INJECTION, SOLUTION INTRAVENOUS CONTINUOUS
Status: DISCONTINUED | OUTPATIENT
Start: 2017-05-28 | End: 2017-05-29

## 2017-05-28 RX ORDER — FLUMAZENIL 0.1 MG/ML
0.2 INJECTION, SOLUTION INTRAVENOUS
Status: DISCONTINUED | OUTPATIENT
Start: 2017-05-28 | End: 2017-05-28

## 2017-05-28 RX ORDER — IODIXANOL 320 MG/ML
50 INJECTION, SOLUTION INTRAVASCULAR ONCE
Status: COMPLETED | OUTPATIENT
Start: 2017-05-28 | End: 2017-05-28

## 2017-05-28 RX ORDER — FENTANYL CITRATE 50 UG/ML
25-50 INJECTION, SOLUTION INTRAMUSCULAR; INTRAVENOUS EVERY 5 MIN PRN
Status: DISCONTINUED | OUTPATIENT
Start: 2017-05-28 | End: 2017-05-28

## 2017-05-28 RX ORDER — NALOXONE HYDROCHLORIDE 0.4 MG/ML
.1-.4 INJECTION, SOLUTION INTRAMUSCULAR; INTRAVENOUS; SUBCUTANEOUS
Status: DISCONTINUED | OUTPATIENT
Start: 2017-05-28 | End: 2017-05-28

## 2017-05-28 RX ADMIN — HYDROMORPHONE HYDROCHLORIDE 0.5 MG: 1 INJECTION, SOLUTION INTRAMUSCULAR; INTRAVENOUS; SUBCUTANEOUS at 04:55

## 2017-05-28 RX ADMIN — HYDROMORPHONE HYDROCHLORIDE 0.5 MG: 1 INJECTION, SOLUTION INTRAMUSCULAR; INTRAVENOUS; SUBCUTANEOUS at 07:05

## 2017-05-28 RX ADMIN — HYDROMORPHONE HYDROCHLORIDE 0.5 MG: 1 INJECTION, SOLUTION INTRAMUSCULAR; INTRAVENOUS; SUBCUTANEOUS at 07:54

## 2017-05-28 RX ADMIN — PIPERACILLIN SODIUM,TAZOBACTAM SODIUM 3.38 G: 3; .375 INJECTION, POWDER, FOR SOLUTION INTRAVENOUS at 21:40

## 2017-05-28 RX ADMIN — HEPARIN SODIUM 5000 UNITS: 5000 INJECTION, SOLUTION INTRAVENOUS; SUBCUTANEOUS at 04:55

## 2017-05-28 RX ADMIN — GABAPENTIN 300 MG: 250 SOLUTION ORAL at 20:31

## 2017-05-28 RX ADMIN — HYDROMORPHONE HYDROCHLORIDE 0.5 MG: 1 INJECTION, SOLUTION INTRAMUSCULAR; INTRAVENOUS; SUBCUTANEOUS at 08:24

## 2017-05-28 RX ADMIN — LIDOCAINE HYDROCHLORIDE 10 ML: 10 INJECTION, SOLUTION EPIDURAL; INFILTRATION; INTRACAUDAL; PERINEURAL at 14:10

## 2017-05-28 RX ADMIN — METOPROLOL TARTRATE 10 MG: 5 INJECTION INTRAVENOUS at 08:53

## 2017-05-28 RX ADMIN — ACETAMINOPHEN 975 MG: 160 SUSPENSION ORAL at 20:31

## 2017-05-28 RX ADMIN — HYDROMORPHONE HYDROCHLORIDE 0.5 MG: 1 INJECTION, SOLUTION INTRAMUSCULAR; INTRAVENOUS; SUBCUTANEOUS at 09:26

## 2017-05-28 RX ADMIN — POTASSIUM CHLORIDE: 2 INJECTION, SOLUTION, CONCENTRATE INTRAVENOUS at 20:56

## 2017-05-28 RX ADMIN — OXYCODONE HYDROCHLORIDE 15 MG: 5 SOLUTION ORAL at 20:30

## 2017-05-28 RX ADMIN — OXYCODONE HYDROCHLORIDE 15 MG: 5 SOLUTION ORAL at 17:34

## 2017-05-28 RX ADMIN — HYDROMORPHONE HYDROCHLORIDE 0.5 MG: 1 INJECTION, SOLUTION INTRAMUSCULAR; INTRAVENOUS; SUBCUTANEOUS at 01:55

## 2017-05-28 RX ADMIN — HYDROMORPHONE HYDROCHLORIDE 0.5 MG: 1 INJECTION, SOLUTION INTRAMUSCULAR; INTRAVENOUS; SUBCUTANEOUS at 11:40

## 2017-05-28 RX ADMIN — LOPERAMIDE HYDROCHLORIDE 4 MG: 1 SOLUTION ORAL at 20:30

## 2017-05-28 RX ADMIN — PIPERACILLIN SODIUM,TAZOBACTAM SODIUM 3.38 G: 3; .375 INJECTION, POWDER, FOR SOLUTION INTRAVENOUS at 09:08

## 2017-05-28 RX ADMIN — HYDROMORPHONE HYDROCHLORIDE 0.5 MG: 1 INJECTION, SOLUTION INTRAMUSCULAR; INTRAVENOUS; SUBCUTANEOUS at 10:24

## 2017-05-28 RX ADMIN — HEPARIN SODIUM 5000 UNITS: 5000 INJECTION, SOLUTION INTRAVENOUS; SUBCUTANEOUS at 20:31

## 2017-05-28 RX ADMIN — FLUCONAZOLE 200 MG: 2 INJECTION INTRAVENOUS at 20:20

## 2017-05-28 RX ADMIN — FENTANYL CITRATE 50 MCG: 50 INJECTION, SOLUTION INTRAMUSCULAR; INTRAVENOUS at 19:24

## 2017-05-28 RX ADMIN — HYDROMORPHONE HYDROCHLORIDE 0.5 MG: 1 INJECTION, SOLUTION INTRAMUSCULAR; INTRAVENOUS; SUBCUTANEOUS at 13:03

## 2017-05-28 RX ADMIN — Medication 10 ML: at 20:30

## 2017-05-28 RX ADMIN — Medication 10 ML: at 15:52

## 2017-05-28 RX ADMIN — PROPOFOL 50 MG: 10 INJECTION, EMULSION INTRAVENOUS at 19:24

## 2017-05-28 RX ADMIN — PIPERACILLIN SODIUM,TAZOBACTAM SODIUM 3.38 G: 3; .375 INJECTION, POWDER, FOR SOLUTION INTRAVENOUS at 01:40

## 2017-05-28 RX ADMIN — PROPOFOL 30 MG: 10 INJECTION, EMULSION INTRAVENOUS at 19:32

## 2017-05-28 RX ADMIN — LOPERAMIDE HYDROCHLORIDE 4 MG: 1 SOLUTION ORAL at 15:52

## 2017-05-28 RX ADMIN — MIDAZOLAM HYDROCHLORIDE 2 MG: 1 INJECTION, SOLUTION INTRAMUSCULAR; INTRAVENOUS at 14:10

## 2017-05-28 RX ADMIN — DEXMEDETOMIDINE HYDROCHLORIDE 0.5 MCG/KG/HR: 100 INJECTION, SOLUTION INTRAVENOUS at 19:15

## 2017-05-28 RX ADMIN — SODIUM CHLORIDE, POTASSIUM CHLORIDE, SODIUM LACTATE AND CALCIUM CHLORIDE: 600; 310; 30; 20 INJECTION, SOLUTION INTRAVENOUS at 19:08

## 2017-05-28 RX ADMIN — FENTANYL CITRATE 150 MCG: 50 INJECTION, SOLUTION INTRAMUSCULAR; INTRAVENOUS at 14:10

## 2017-05-28 RX ADMIN — HYDROMORPHONE HYDROCHLORIDE 0.5 MG: 1 INJECTION, SOLUTION INTRAMUSCULAR; INTRAVENOUS; SUBCUTANEOUS at 15:52

## 2017-05-28 RX ADMIN — LIDOCAINE 2 PATCH: 50 PATCH TOPICAL at 08:58

## 2017-05-28 RX ADMIN — MIDAZOLAM HYDROCHLORIDE 1 MG: 1 INJECTION, SOLUTION INTRAMUSCULAR; INTRAVENOUS at 19:08

## 2017-05-28 RX ADMIN — IODIXANOL 20 ML: 320 INJECTION, SOLUTION INTRAVASCULAR at 13:15

## 2017-05-28 RX ADMIN — Medication 6 MG: at 20:30

## 2017-05-28 RX ADMIN — PIPERACILLIN SODIUM,TAZOBACTAM SODIUM 3.38 G: 3; .375 INJECTION, POWDER, FOR SOLUTION INTRAVENOUS at 14:58

## 2017-05-28 NOTE — PROGRESS NOTES
Interventional Radiology Intra-procedural Nursing Note    Patient Name: Minerva Blanco  Medical Record Number: [id number]  Today's Date: May 28, 2017    Start Time: 1335  Staff: Dr. Richa Campos pager # [phone]  End of procedure time: 1410  Procedure: J-tube replacement   Report given to: RN 7B   Time pt departs:  1410    Rcd pt from 7B. Pt ambulated to stretcher transported to -3. Consent obtained and verified. Pt transferred in supine position HOB 45 to procedure table using hovermat x 2 assist     SEAN J-tube LOT [id number] REF [id number] EXP 2018-08-01 14Fr  LUQ    Pt tolerated procedure well. VSS. SBAR report called to ROMAINE Bennett RN,BSN

## 2017-05-28 NOTE — PLAN OF CARE
"Vitals:    05/27/17 1821 05/27/17 1900 05/27/17 2012 05/27/17 2021   BP: 132/61  136/64    BP Location: Right arm Right arm Right arm    Cuff Size:       Pulse: 76   80   Resp: 8   12   Temp: 95.6  F (35.3  C)   96.2  F (35.7  C)   TempSrc: Oral   Oral   SpO2: 96%   100%   Weight:       Height:         AO x4, VSS. Pt c/o pain on the surgical site, administered scheduled oxycodone and PRN Dilaudid x2. Clear diminished lung sounds. Remains NPO. Loose stool x2 this shift. Chest dressing reinforced. Pt independently uses commode. Tolerating Iv antibiotics per order. Tube feeding adjusted and ran at 20-25mL/hr rate per pt request from beginning of the shift to 10pm. J tube \"fell out\" while pt was sitting on the commode-- denies any pain, scant clear drainage, covered with gauze; provider notified. Continue with current POC.  "

## 2017-05-28 NOTE — PLAN OF CARE
Problem: Goal Outcome Summary  Goal: Goal Outcome Summary  OT/7B: Facilitated ADLs and education regarding sternal precautions for increased independence and compliance. Pt independent for bed mobility within precautions, independent for sit > stand. Pt independent for short distances for functional mobility with no AD, no LOB, good tolerance for upright mobility. Facilitated standing ADLs for increased strength, with setup completed independently.     Recommendation: Home with assist as needed when medically stable. Pt continues to be limited by post-surgical precautions, pain, and decreased strength.

## 2017-05-28 NOTE — OR NURSING
Patient's hemoglobin has slowly been dropping during this admission.  Patient has been acutely ill and has not eaten since admission.  No noted history of anemia in H&P.  Yesterday's hgb of 7.0.  Platelet level drawn this morning so will have lab add hgb to this.  Dr Migue duong with.

## 2017-05-28 NOTE — PROGRESS NOTES
CLINICAL NUTRITION SERVICES - BRIEF NOTE     Nutrition Prescription    RECOMMENDATIONS FOR MDs/PROVIDERS TO ORDER:  Pharmacy to order the following (RD sent change request): Start TPN @ 60 mL/hr (1440 mL/day, or per provider pending fluid status/IVF requirements) with 85 g dextrose (GIR 1.4 mg/kg/min) , 80 g AA, and 250 mL 20% IV lipids 5x/week. Rec adv dextrose by 50 g/day if K+/Mg++ >/= nrml and phos >1.9 and BGs stable to goal dextrose 185 g/day. Goal TPN would provide 1306 kcal (30 kcal/kg), 1.9 g/kg PRO, GIR 3 mg/kg/min, and 27% kcal from fat per dosing weight 43 kg.     Rec changing Jtube meds over to IV if appropriate. TPN will have a full MVI and minerals, so this can at least be changed.    Future/Additional Recommendations:  Resumption of enteral feedings when appropriate.    Weekly monitoring of TPN labs (TGs, liver enzymes) and adjustment of TPN as needed.       NEW FINDINGS   - Jtube fell out and TFs are on hold. Providers placed consult to being TPN.  - Pt made NPO.   - Phos 2.7, Mg++ 1.7, and K+ 3.5 - may be at refeeding risk with resumption of nutrition.    Mike Sherwood RD, LD  Wkend Pager: [phone]  7B Wkday Pager: [phone]

## 2017-05-28 NOTE — OP NOTE
DATE OF PROCEDURE:  2017      PREOPERATIVE DIAGNOSIS:  Open chest wound, status post anastomotic leak after esophagectomy.      POSTOPERATIVE DIAGNOSIS:  Open chest, status post anastomotic leak after esophagectomy.      PROCEDURE PERFORMED:  Chest wound washout and dressing change.      DESCRIPTION OF PROCEDURE:  After obtaining informed consent, Favian Blanco was brought into the operating room and laid supine on the operating table with the head up to avoid aspiration.  The packing from the day before was removed.  The cavity was then washed with warm saline.  After thoroughly washing out the cavity, it was repacked with Kerlix gauze again.  The procedure was done with deep sedation and monitored anesthesia care.  The patient tolerated the procedure well.         CONCHIS MARRERO MD             D: 2017 18:26   T: 2017 18:57   MT: MYRNA      Name:     FAVIAN BLANCO   MRN:      [id number]        Account:        [id number]   :      1945           Procedure Date: 2017      Document: [id number]

## 2017-05-28 NOTE — PLAN OF CARE
Problem: Goal Outcome Summary  Goal: Goal Outcome Summary  Patient sleeps in between cares, had 2x Dilaudid 0.5 mg iv prn for pain in drain sites. Reinforsed dressing in chest area. Drain to gravity, off-white colored thick drainage. Old J-tube site is cdi, maintained on NPO, scheduled meds not given except IV and inj. PICC line intact, purple port infusing. +Crackles LS lower lobes, denies SOB. +BS, +whooshing sound, no BM, uses commode independently. Voiding adequate amount. + BLE edema. Continue poc.

## 2017-05-28 NOTE — BRIEF OP NOTE
Interventional Radiology Brief Post Procedure Note    Procedure: IR GASTRO JEJUNOSTOMY TUBE PLACEMENT    Proceduralist: Franklin Chaudhry MD    Assistant: Roverto Campos MD    Time Out: Prior to the start of the procedure and with procedural staff participation, I verbally confirmed the patient s identity using two indicators, relevant allergies, that the procedure was appropriate and matched the consent or emergent situation, and that the correct equipment/implants were available. Immediately prior to starting the procedure I conducted the Time Out with the procedural staff and re-confirmed the patient s name, procedure, and site/side. (The Joint Commission universal protocol was followed.)  Yes    Sedation: IR Nurse Monitored Care     Post Procedure Summary:  Prior to the start of the procedure and with procedural staff participation, I verbally confirmed the patient s identity using two indicators, relevant allergies, that the procedure was appropriate and matched the consent or emergent situation, and that the correct equipment/implants were available. Immediately prior to starting the procedure I conducted the Time Out with the procedural staff and re-confirmed the patient s name, procedure, and site/side. (The Joint Commission universal protocol was followed.)  Yes       Sedatives: Fentanyl and Midazolam (Versed)    Vital signs, airway and pulse oximetry were monitored and remained stable throughout the procedure and sedation was maintained until the procedure was complete.  The patient was monitored by staff until sedation discharge criteria were met.    Patient tolerance: Patient tolerated the procedure well with no immediate complications.    Time of sedation in minutes: 15 Minutes minutes from beginning to end of physician one to one monitoring.    Findings: Wire removed (stiff end of wire was in bowel). Jejunum catheterized with JB1 catheter. Efferent portion confirmed heading towards right low  abdomen. 14F Jejunal tube placed cut to 15 cm from balloon. Tube flushed ready for use.    Estimated Blood Loss: None    Fluoroscopy Time: Less than 1 minute       SPECIMENS: None    Complications: 1. None     Condition: Stable    Plan: Tube ready for use.    Comments: See dictated procedure note for full details.    Franklin Chaudhry MD

## 2017-05-28 NOTE — PLAN OF CARE
Problem: Goal Outcome Summary  Goal: Goal Outcome Summary  Outcome: No Change  Pt received IV Dilaudid this shift because J-tube fell out last evening. . Appeared to have increased pain and requested Dilaudid (0.5mg) approximately every 1-2 hours. Pt went to IR for tube placement and returned to unit approximately 1445. VSS and patient easily arousable. Lungs clear. Up to commode independently. Dressing on chest reinforced x1-large amount of green/tan/purulent drainage.  at bedside-very supportive.

## 2017-05-28 NOTE — PLAN OF CARE
Problem: Goal Outcome Summary  Goal: Goal Outcome Summary  PT: Order received. Per chart review and discussion with OT pt has no acute PT needs. Will defer PT and complete PT order. OT will continue to work with pt while admitted.

## 2017-05-28 NOTE — PROGRESS NOTES
Thoracic surgery progress note    J tube fell out overnight. Diarrhea improved with no tube feeds. Continued pain.     Temp: 96.5  F (35.8  C) Temp  Min: 95.5  F (35.3  C)  Max: 96.5  F (35.8  C)  Resp: 16 Resp  Min: 12  Max: 20  SpO2: 97 % SpO2  Min: 95 %  Max: 100 %  Pulse: 80 Pulse  Min: 55  Max: 80    No Data Recorded  BP: 165/77 Systolic (24hrs), Av , Min:109 , Max:165   Diastolic (24hrs), Av, Min:46, Max:77    A&O, NAD  Unlabored breathing on RA  Chest wound dressing w/ ss staining  Chest tube to gravity w/ seropurulent drainage   Abd soft  RRR    I&O  CT 30/-    72F w/ AFib on Coumadin and chronic esophageal perforation and mediastinal abscess cavity s/p gastric pullup c/b recurrent esophageal leak and mediastinal abscess s/p serial washouts      - Tried to replace JTube with a pediatric ojeda, but pt did not tolerate well.   - Glide wire use to stent the ostomy  - IR consult for JTube replacement  - Initiate TPN  - IV pain meds until JTube is replaced  - Strict NPO.   - Chest tube to gravity  - Daily OR washouts under MAC as a bridge to VAC therapy  - C/w Zosyn/Fluc ( - )  - Heparin SQ      Seen w/ fellow, Dr Chinyere Liu MD  Surgery PGY1  [phone]

## 2017-05-29 ENCOUNTER — ANESTHESIA EVENT (OUTPATIENT)
Dept: SURGERY | Facility: CLINIC | Age: 72
DRG: 856 | End: 2017-05-29
Payer: MEDICARE

## 2017-05-29 ENCOUNTER — HOSPITAL ENCOUNTER (INPATIENT)
Dept: VASCULAR ULTRASOUND | Facility: CLINIC | Age: 72
DRG: 856 | End: 2017-05-29
Attending: STUDENT IN AN ORGANIZED HEALTH CARE EDUCATION/TRAINING PROGRAM | Admitting: THORACIC SURGERY (CARDIOTHORACIC VASCULAR SURGERY)
Payer: MEDICARE

## 2017-05-29 ENCOUNTER — APPOINTMENT (OUTPATIENT)
Dept: ULTRASOUND IMAGING | Facility: CLINIC | Age: 72
DRG: 856 | End: 2017-05-29
Attending: STUDENT IN AN ORGANIZED HEALTH CARE EDUCATION/TRAINING PROGRAM
Payer: MEDICARE

## 2017-05-29 LAB
ALBUMIN SERPL-MCNC: 1.6 G/DL (ref 3.4–5)
ALP SERPL-CCNC: 144 U/L (ref 40–150)
ALT SERPL W P-5'-P-CCNC: 7 U/L (ref 0–50)
ANION GAP SERPL CALCULATED.3IONS-SCNC: 7 MMOL/L (ref 3–14)
AST SERPL W P-5'-P-CCNC: 11 U/L (ref 0–45)
BILIRUB SERPL-MCNC: 0.8 MG/DL (ref 0.2–1.3)
BUN SERPL-MCNC: 2 MG/DL (ref 7–30)
CALCIUM SERPL-MCNC: 7.9 MG/DL (ref 8.5–10.1)
CHLORIDE SERPL-SCNC: 113 MMOL/L (ref 94–109)
CO2 SERPL-SCNC: 24 MMOL/L (ref 20–32)
CREAT SERPL-MCNC: 0.45 MG/DL (ref 0.52–1.04)
ERYTHROCYTE [DISTWIDTH] IN BLOOD BY AUTOMATED COUNT: 17.5 % (ref 10–15)
GFR SERPL CREATININE-BSD FRML MDRD: ABNORMAL ML/MIN/1.7M2
GLUCOSE SERPL-MCNC: 78 MG/DL (ref 70–99)
HCT VFR BLD AUTO: 25.5 % (ref 35–47)
HGB BLD-MCNC: 7.7 G/DL (ref 11.7–15.7)
INR PPP: 1.51 (ref 0.86–1.14)
MAGNESIUM SERPL-MCNC: 1.8 MG/DL (ref 1.6–2.3)
MCH RBC QN AUTO: 27.8 PG (ref 26.5–33)
MCHC RBC AUTO-ENTMCNC: 30.2 G/DL (ref 31.5–36.5)
MCV RBC AUTO: 92 FL (ref 78–100)
PHOSPHATE SERPL-MCNC: 2.7 MG/DL (ref 2.5–4.5)
PLATELET # BLD AUTO: 490 10E9/L (ref 150–450)
POTASSIUM SERPL-SCNC: 3.4 MMOL/L (ref 3.4–5.3)
PROT SERPL-MCNC: 5.9 G/DL (ref 6.8–8.8)
RADIOLOGIST FLAGS: ABNORMAL
RADIOLOGIST FLAGS: ABNORMAL
RBC # BLD AUTO: 2.77 10E12/L (ref 3.8–5.2)
SODIUM SERPL-SCNC: 144 MMOL/L (ref 133–144)
WBC # BLD AUTO: 7.7 10E9/L (ref 4–11)

## 2017-05-29 PROCEDURE — 36415 COLL VENOUS BLD VENIPUNCTURE: CPT | Performed by: STUDENT IN AN ORGANIZED HEALTH CARE EDUCATION/TRAINING PROGRAM

## 2017-05-29 PROCEDURE — 25000125 ZZHC RX 250: Performed by: SURGERY

## 2017-05-29 PROCEDURE — 93971 EXTREMITY STUDY: CPT | Mod: RT

## 2017-05-29 PROCEDURE — 84134 ASSAY OF PREALBUMIN: CPT | Performed by: STUDENT IN AN ORGANIZED HEALTH CARE EDUCATION/TRAINING PROGRAM

## 2017-05-29 PROCEDURE — 85027 COMPLETE CBC AUTOMATED: CPT | Performed by: STUDENT IN AN ORGANIZED HEALTH CARE EDUCATION/TRAINING PROGRAM

## 2017-05-29 PROCEDURE — 85520 HEPARIN ASSAY: CPT | Performed by: STUDENT IN AN ORGANIZED HEALTH CARE EDUCATION/TRAINING PROGRAM

## 2017-05-29 PROCEDURE — 25000132 ZZH RX MED GY IP 250 OP 250 PS 637: Mod: GY | Performed by: STUDENT IN AN ORGANIZED HEALTH CARE EDUCATION/TRAINING PROGRAM

## 2017-05-29 PROCEDURE — 85610 PROTHROMBIN TIME: CPT | Performed by: STUDENT IN AN ORGANIZED HEALTH CARE EDUCATION/TRAINING PROGRAM

## 2017-05-29 PROCEDURE — 25000125 ZZHC RX 250: Performed by: STUDENT IN AN ORGANIZED HEALTH CARE EDUCATION/TRAINING PROGRAM

## 2017-05-29 PROCEDURE — 27210429 ZZH NUTRITION PRODUCT INTERMEDIATE LITER

## 2017-05-29 PROCEDURE — 25800025 ZZH RX 258: Performed by: STUDENT IN AN ORGANIZED HEALTH CARE EDUCATION/TRAINING PROGRAM

## 2017-05-29 PROCEDURE — 84100 ASSAY OF PHOSPHORUS: CPT | Performed by: STUDENT IN AN ORGANIZED HEALTH CARE EDUCATION/TRAINING PROGRAM

## 2017-05-29 PROCEDURE — 80053 COMPREHEN METABOLIC PANEL: CPT | Performed by: STUDENT IN AN ORGANIZED HEALTH CARE EDUCATION/TRAINING PROGRAM

## 2017-05-29 PROCEDURE — 12000003 ZZH R&B CRITICAL UMMC

## 2017-05-29 PROCEDURE — 40000141 ZZH STATISTIC PERIPHERAL IV START W/O US GUIDANCE

## 2017-05-29 PROCEDURE — 25000125 ZZHC RX 250

## 2017-05-29 PROCEDURE — 36569 INSJ PICC 5 YR+ W/O IMAGING: CPT

## 2017-05-29 PROCEDURE — A9270 NON-COVERED ITEM OR SERVICE: HCPCS | Mod: GY | Performed by: STUDENT IN AN ORGANIZED HEALTH CARE EDUCATION/TRAINING PROGRAM

## 2017-05-29 PROCEDURE — 25000128 H RX IP 250 OP 636: Performed by: THORACIC SURGERY (CARDIOTHORACIC VASCULAR SURGERY)

## 2017-05-29 PROCEDURE — 25000128 H RX IP 250 OP 636: Performed by: STUDENT IN AN ORGANIZED HEALTH CARE EDUCATION/TRAINING PROGRAM

## 2017-05-29 PROCEDURE — 40000556 ZZH STATISTIC PERIPHERAL IV START W US GUIDANCE

## 2017-05-29 PROCEDURE — C1751 CATH, INF, PER/CENT/MIDLINE: HCPCS

## 2017-05-29 PROCEDURE — A9270 NON-COVERED ITEM OR SERVICE: HCPCS | Mod: GY | Performed by: SURGERY

## 2017-05-29 PROCEDURE — 83735 ASSAY OF MAGNESIUM: CPT | Performed by: STUDENT IN AN ORGANIZED HEALTH CARE EDUCATION/TRAINING PROGRAM

## 2017-05-29 PROCEDURE — 25000132 ZZH RX MED GY IP 250 OP 250 PS 637: Mod: GY | Performed by: SURGERY

## 2017-05-29 RX ORDER — POTASSIUM CHLORIDE 1.5 G/1.58G
20-40 POWDER, FOR SOLUTION ORAL
Status: DISCONTINUED | OUTPATIENT
Start: 2017-05-29 | End: 2017-06-21

## 2017-05-29 RX ORDER — POTASSIUM CHLORIDE 750 MG/1
20-40 TABLET, EXTENDED RELEASE ORAL
Status: DISCONTINUED | OUTPATIENT
Start: 2017-05-29 | End: 2017-06-21

## 2017-05-29 RX ORDER — POTASSIUM CL/LIDO/0.9 % NACL 10MEQ/0.1L
10 INTRAVENOUS SOLUTION, PIGGYBACK (ML) INTRAVENOUS
Status: DISCONTINUED | OUTPATIENT
Start: 2017-05-29 | End: 2017-06-21

## 2017-05-29 RX ORDER — LIDOCAINE 40 MG/G
CREAM TOPICAL
Status: DISCONTINUED | OUTPATIENT
Start: 2017-05-29 | End: 2017-06-13

## 2017-05-29 RX ORDER — DEXTROSE MONOHYDRATE, SODIUM CHLORIDE, AND POTASSIUM CHLORIDE 50; 1.49; 4.5 G/1000ML; G/1000ML; G/1000ML
INJECTION, SOLUTION INTRAVENOUS CONTINUOUS
Status: DISCONTINUED | OUTPATIENT
Start: 2017-05-29 | End: 2017-06-03

## 2017-05-29 RX ORDER — POTASSIUM CHLORIDE 29.8 MG/ML
20 INJECTION INTRAVENOUS
Status: DISCONTINUED | OUTPATIENT
Start: 2017-05-29 | End: 2017-06-21

## 2017-05-29 RX ORDER — HEPARIN SODIUM,PORCINE 10 UNIT/ML
2-5 VIAL (ML) INTRAVENOUS
Status: DISCONTINUED | OUTPATIENT
Start: 2017-05-29 | End: 2017-06-03

## 2017-05-29 RX ORDER — MAGNESIUM SULFATE HEPTAHYDRATE 40 MG/ML
4 INJECTION, SOLUTION INTRAVENOUS EVERY 4 HOURS PRN
Status: DISCONTINUED | OUTPATIENT
Start: 2017-05-29 | End: 2017-06-29 | Stop reason: HOSPADM

## 2017-05-29 RX ORDER — LIDOCAINE 40 MG/G
CREAM TOPICAL
Status: DISCONTINUED | OUTPATIENT
Start: 2017-05-29 | End: 2017-06-03

## 2017-05-29 RX ORDER — POTASSIUM CHLORIDE 7.45 MG/ML
10 INJECTION INTRAVENOUS
Status: DISCONTINUED | OUTPATIENT
Start: 2017-05-29 | End: 2017-06-21

## 2017-05-29 RX ADMIN — Medication 1 PACKET: at 09:54

## 2017-05-29 RX ADMIN — OXYCODONE HYDROCHLORIDE 15 MG: 5 SOLUTION ORAL at 09:40

## 2017-05-29 RX ADMIN — METOPROLOL TARTRATE 25 MG: 100 TABLET ORAL at 19:34

## 2017-05-29 RX ADMIN — MINERAL SUPPLEMENT IRON 300 MG / 5 ML STRENGTH LIQUID 100 PER BOX UNFLAVORED 300 MG: at 09:55

## 2017-05-29 RX ADMIN — Medication 6 MG: at 15:47

## 2017-05-29 RX ADMIN — OXYCODONE HYDROCHLORIDE 15 MG: 5 SOLUTION ORAL at 06:00

## 2017-05-29 RX ADMIN — FLUCONAZOLE 200 MG: 2 INJECTION INTRAVENOUS at 19:34

## 2017-05-29 RX ADMIN — OXYCODONE HYDROCHLORIDE 15 MG: 5 SOLUTION ORAL at 00:23

## 2017-05-29 RX ADMIN — OXYCODONE HYDROCHLORIDE 15 MG: 5 SOLUTION ORAL at 03:19

## 2017-05-29 RX ADMIN — ACETAMINOPHEN 975 MG: 160 SUSPENSION ORAL at 15:46

## 2017-05-29 RX ADMIN — POTASSIUM CHLORIDE, DEXTROSE MONOHYDRATE AND SODIUM CHLORIDE: 150; 5; 450 INJECTION, SOLUTION INTRAVENOUS at 03:19

## 2017-05-29 RX ADMIN — Medication 10 ML: at 09:54

## 2017-05-29 RX ADMIN — OXYCODONE HYDROCHLORIDE 15 MG: 5 SOLUTION ORAL at 15:47

## 2017-05-29 RX ADMIN — HEPARIN SODIUM 5000 UNITS: 5000 INJECTION, SOLUTION INTRAVENOUS; SUBCUTANEOUS at 12:56

## 2017-05-29 RX ADMIN — HYDROMORPHONE HYDROCHLORIDE 0.5 MG: 1 INJECTION, SOLUTION INTRAMUSCULAR; INTRAVENOUS; SUBCUTANEOUS at 02:15

## 2017-05-29 RX ADMIN — I.V. FAT EMULSION 250 ML: 20 EMULSION INTRAVENOUS at 19:00

## 2017-05-29 RX ADMIN — LIDOCAINE HYDROCHLORIDE 3 ML: 10 INJECTION, SOLUTION INFILTRATION; PERINEURAL at 15:09

## 2017-05-29 RX ADMIN — POTASSIUM CHLORIDE: 2 INJECTION, SOLUTION, CONCENTRATE INTRAVENOUS at 19:00

## 2017-05-29 RX ADMIN — HYDROMORPHONE HYDROCHLORIDE 0.5 MG: 1 INJECTION, SOLUTION INTRAMUSCULAR; INTRAVENOUS; SUBCUTANEOUS at 07:34

## 2017-05-29 RX ADMIN — PIPERACILLIN SODIUM,TAZOBACTAM SODIUM 3.38 G: 3; .375 INJECTION, POWDER, FOR SOLUTION INTRAVENOUS at 03:19

## 2017-05-29 RX ADMIN — Medication 10 ML: at 15:47

## 2017-05-29 RX ADMIN — LOPERAMIDE HYDROCHLORIDE 4 MG: 1 SOLUTION ORAL at 19:33

## 2017-05-29 RX ADMIN — METOPROLOL TARTRATE 10 MG: 5 INJECTION INTRAVENOUS at 09:26

## 2017-05-29 RX ADMIN — PIPERACILLIN SODIUM,TAZOBACTAM SODIUM 3.38 G: 3; .375 INJECTION, POWDER, FOR SOLUTION INTRAVENOUS at 15:49

## 2017-05-29 RX ADMIN — LOPERAMIDE HYDROCHLORIDE 4 MG: 1 SOLUTION ORAL at 09:55

## 2017-05-29 RX ADMIN — HYDROMORPHONE HYDROCHLORIDE 0.5 MG: 1 INJECTION, SOLUTION INTRAMUSCULAR; INTRAVENOUS; SUBCUTANEOUS at 12:22

## 2017-05-29 RX ADMIN — LOPERAMIDE HYDROCHLORIDE 4 MG: 1 SOLUTION ORAL at 15:47

## 2017-05-29 RX ADMIN — POTASSIUM CHLORIDE, DEXTROSE MONOHYDRATE AND SODIUM CHLORIDE: 150; 5; 450 INJECTION, SOLUTION INTRAVENOUS at 15:43

## 2017-05-29 RX ADMIN — OXYCODONE HYDROCHLORIDE 15 MG: 5 SOLUTION ORAL at 19:00

## 2017-05-29 RX ADMIN — PIPERACILLIN SODIUM,TAZOBACTAM SODIUM 3.38 G: 3; .375 INJECTION, POWDER, FOR SOLUTION INTRAVENOUS at 22:15

## 2017-05-29 RX ADMIN — HYDROMORPHONE HYDROCHLORIDE 0.5 MG: 1 INJECTION, SOLUTION INTRAMUSCULAR; INTRAVENOUS; SUBCUTANEOUS at 14:31

## 2017-05-29 RX ADMIN — Medication 1 PACKET: at 15:57

## 2017-05-29 RX ADMIN — HEPARIN SODIUM 5000 UNITS: 5000 INJECTION, SOLUTION INTRAVENOUS; SUBCUTANEOUS at 19:35

## 2017-05-29 RX ADMIN — Medication 6 MG: at 09:55

## 2017-05-29 RX ADMIN — OXYCODONE HYDROCHLORIDE 15 MG: 5 SOLUTION ORAL at 22:15

## 2017-05-29 RX ADMIN — PANTOPRAZOLE SODIUM 40 MG: 40 TABLET, DELAYED RELEASE ORAL at 09:55

## 2017-05-29 RX ADMIN — GABAPENTIN 300 MG: 250 SOLUTION ORAL at 19:34

## 2017-05-29 RX ADMIN — GABAPENTIN 300 MG: 250 SOLUTION ORAL at 12:48

## 2017-05-29 RX ADMIN — Medication 2 G: at 17:11

## 2017-05-29 RX ADMIN — OXYCODONE HYDROCHLORIDE 15 MG: 5 SOLUTION ORAL at 12:48

## 2017-05-29 RX ADMIN — ACETAMINOPHEN 975 MG: 160 SUSPENSION ORAL at 06:00

## 2017-05-29 RX ADMIN — TRAZODONE HYDROCHLORIDE 100 MG: 100 TABLET ORAL at 22:14

## 2017-05-29 RX ADMIN — Medication 6 MG: at 22:15

## 2017-05-29 RX ADMIN — Medication 10 ML: at 19:33

## 2017-05-29 RX ADMIN — PIPERACILLIN SODIUM,TAZOBACTAM SODIUM 3.38 G: 3; .375 INJECTION, POWDER, FOR SOLUTION INTRAVENOUS at 09:40

## 2017-05-29 RX ADMIN — Medication 6 MG: at 02:10

## 2017-05-29 ASSESSMENT — PAIN DESCRIPTION - DESCRIPTORS
DESCRIPTORS: ACHING;SHARP

## 2017-05-29 ASSESSMENT — ENCOUNTER SYMPTOMS: DYSRHYTHMIAS: 1

## 2017-05-29 NOTE — PLAN OF CARE
Vitals:    05/28/17 1829 05/28/17 2014 05/28/17 2031 05/28/17 2045   BP:  153/65 149/61 149/58   BP Location:  Right arm Right arm Right arm   Cuff Size:       Pulse:       Resp:  16 16 16   Temp:  95.9  F (35.5  C)     TempSrc:  Oral     SpO2:  99% 100% 100%   Weight: 48.1 kg (106 lb)      Height:         AO x4, VSS. Left for OR @1800, returned to 7B @2000. Pt lethargic throughout the shift. Pt c/o pain on the surgical site, administered scheduled oxycodone and PRN Dilaudid x1. Remains NPO. Clear but diminished lung sounds. Incontinent loose stool x1. Tolerating IV antibiotics per order. Metoprolol held d/t bradycardia. Trazodone held d/t lethargy. Continuous TPN @60mL/hr. Ultrasound on LUE in process. Continue with current POC.

## 2017-05-29 NOTE — PLAN OF CARE
Problem: Individualization  Goal: Patient Preferences  Outcome: No Change  /60 (BP Location: Right arm)  Pulse 72  Temp 95.8  F (35.4  C) (Axillary)  Resp 18  Ht 1.524 m (5')  Wt 48.1 kg (106 lb)  SpO2 98%  Breastfeeding? No  BMI 20.7 kg/m2      Patient had ultrasound of the left arm where PICC was present -DVT present and PICC pulled per MD, new orders for new PICC to be placed this am, two peripheral iv's in right arm; up to the bedside commode independently urinating and having loose/soft BM's; sternal wound CDI with CT to gravity; J-tube in place and receiving meds; receiving scheduled oxycodone and IV dilaudid given once; up ad renea; appeared to have rested well after 0300

## 2017-05-29 NOTE — BRIEF OP NOTE
General acute hospital, [place]    Brief Operative Note    Pre-operative diagnosis: chest wound  Post-operative diagnosis chest wound  Procedure: Procedure(s):  Chest washout - Wound Class: I-Clean  Surgeon: Surgeon(s) and Role:     * Nalini Barreto MD - Primary     * Storm Whitlock MD - Assisting  Anesthesia: General   Estimated blood loss: none  Drains: Prior chest tube left in position  Specimens: none  Findings:   Some granulation at the wound base, no significant exudate  Complications: none  Implants: none

## 2017-05-29 NOTE — PLAN OF CARE
Problem: Goal Outcome Summary  Goal: Goal Outcome Summary  Outcome: No Change  VSS, afebrile. Pain moderately controlled. C/o sharp ache to chest wound. Dressing changed this AM by MD. Dressing reinforced as needed. IV Dilaudid and scheduled oral oxycodone through j tube for pain. Pt up independently in room. Continues to have watery stools. Sediment noted. Adequate urine output. Pt had PICC replaced today. Ultrasound to RUE done to rule out DVT in right arm. Will start TPN this PM. Pt mood tearful. Feeling overwhelmed and depressed. Pt able to talk about experience and verbalize emotions. Sat with patient and listened. Will continue with POC.

## 2017-05-29 NOTE — PROGRESS NOTES
"SPIRITUAL HEALTH SERVICES  SPIRITUAL ASSESSMENT Progress Note  [facility] ([place]) U7B     REFERRAL SOURCE: Request from nurse to see Minerva.    Outcome: I have seen Minerva before she was very pleased that I came to see her today.  Her  Enrique was bedside during our visit.  Minerva was very emotional as she shared what took place last night.  Her  Enrique spoke up \"you would not believe what she has been through.\"  Minerva shared that they were suppose to be camping this weekend another disappointment.  Minerva expressed that she is in the middle with be angry with God, questions \"why\" just when she makes movement forward there are set backs.  Minerva is very concerned for Enrique as he is \"my rock, my foundation and if anything happens to him I don't know what I would do.\"  Minerva asked for a prayer that would include care for Enrique and for her self to include comfort, and healing.  Today visit was one of spiritual/emotional support for Minerva and Enrique.        PLAN: [facility] will be available for Minerva for the duration of her stay.    Tyler Odonnell  Chaplain Resident  Pager [phone]  "

## 2017-05-29 NOTE — ANESTHESIA POSTPROCEDURE EVALUATION
Patient: Minerva Blanco    Procedure(s):  Chest washout - Wound Class: IV-Dirty or Infected    Diagnosis:chest wound  Diagnosis Additional Information: No value filed.    Anesthesia Type:  MAC    Note:  Anesthesia Post Evaluation    Patient location during evaluation: Bedside  Patient participation: Able to fully participate in evaluation  Level of consciousness: awake  Pain management: adequate  Airway patency: patent  Respiratory status: acceptable  Hydration status: acceptable  PONV: none             Last vitals:  Vitals:    05/28/17 2014 05/28/17 2031 05/28/17 2045   BP: 153/65 149/61 149/58   Pulse:      Resp: 16 16 16   Temp: 35.5  C (95.9  F)     SpO2: 99% 100% 100%         Electronically Signed By: Byron Rodriguez MD  May 28, 2017  9:10 PM

## 2017-05-29 NOTE — ANESTHESIA CARE TRANSFER NOTE
Patient: Minerva Blanco    Procedure(s):  Chest washout - Wound Class: IV-Dirty or Infected    Diagnosis: chest wound  Diagnosis Additional Information: No value filed.    Anesthesia Type:   MAC     Note:  Airway :Face Mask  Patient transferred to:Medical/Surgical Unit  Comments: VSS, awake and responding appropriately, breathing nonlabored, sats 99 on 2L simple face mask. Report given to floor RN.      Vitals: (Last set prior to Anesthesia Care Transfer)    CRNA VITALS  5/28/2017 1917 - 5/28/2017 1958      5/28/2017             NIBP: 142/63    Ht Rate: 59    SpO2: 99 %    EKG: Sinus rhythm                Electronically Signed By: France Gee MD  May 28, 2017  7:58 PM

## 2017-05-29 NOTE — PROGRESS NOTES
Thoracic surgery progress note    PICC associated DVT found on LUE U/S. PICC line removed. Pain controlled. Diarrhea way down with no tube feeds. Tolerating TPN.     Temp: 95.8  F (35.4  C) Temp  Min: 95.8  F (35.4  C)  Max: 98.4  F (36.9  C)  Resp: 18 Resp  Min: 16  Max: 20  SpO2: 98 % SpO2  Min: 94 %  Max: 100 %  Pulse: 72 Pulse  Min: 72  Max: 80  Heart Rate: 64 Heart Rate  Min: 54  Max: 75  BP: 136/60 Systolic (24hrs), Av , Min:128 , Max:178   Diastolic (24hrs), Av, Min:58, Max:111    A&O, NAD  Unlabored breathing on RA  Chest wound dressing c/d/i  Chest tube w/ sero purulent drainage  Abd soft, non tender, non distended    I&O  UOP 1025    Imaging  EXAMINATION: DOPPLER VENOUS ULTRASOUND OF THE LEFT UPPER EXTREMITY,  2017 11:22 PM   Left: Normal blood flow and waveforms demonstrated in the internal jugular and innominate veins. Occlusive thrombus in the axillary vein with nonocclusive thrombus in the mid subclavian vein associated with PICC. Brachial vein, cephalic veins are patent. Radial and ulnar veins are patent.     IMPRESSION: PICC associated occlusive left axillary vein thrombus. Partially occlusive thrombus in the subclavian vein.     I&O  UOP 1025    72F w/ AFib on Coumadin and chronic esophageal perforation and mediastinal abscess cavity s/p gastric pullup c/b recurrent esophageal leak and mediastinal abscess s/p serial washouts      - PICC re-placement today for TPN  - Will restart TF at a slow rate tomorrow  - Strict NPO.   - Chest tube to gravity  - OR washouts under MAC every other day. Will change dressing at bedside today  - C/w Zosyn/Fluc ( - )  - Heparin SQ      Seen w/ fellow, Dr Chinyere Liu MD  Surgery PGY1  [phone]

## 2017-05-29 NOTE — ANESTHESIA PREPROCEDURE EVALUATION
Anesthesia Evaluation     . Pt has had prior anesthetic. Type: General and MAC    No history of anesthetic complications          ROS/MED HX    ENT/Pulmonary:  - neg pulmonary ROS     Neurologic: Comment: Meniere's Disease     (+)Multiple Sclerosis     Cardiovascular:  - neg cardiovascular ROS   (+) ----. Taking blood thinners : . . . :. dysrhythmias a-fib, . 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 pull up complicated by esophageal leak and mediastinal abscess s/p serial washouts under MAC; hiatal hernia; IBS    (+) GERD       Renal/Genitourinary:  - ROS Renal section negative       Endo:  - neg endo ROS       Psychiatric:  - neg psychiatric ROS       Infectious Disease: Comment: See GI         Malignancy:   (+) Malignancy History of Breast          Other: Comment: Fibromyalgia                   Procedure: Procedure(s):  Irrigation and Debridement Chest Washout  - Wound Class:     HPI: 72 year old female with h/o chronic esophageal perforation and mediastinal abscess requires anesthesia for Procedure(s):  Irrigation and Debridement Chest Washout  - Wound Class: . He has been getting serial washouts under MAC for mediastinal abscess 2/2 esophageal leak. PMH significant for AF, GERD, hiatal hernia, MS, Meniere's disease.     PMH/PSH:  Past Medical History:   Diagnosis Date     Atrial fibrillation (H)      Breast cancer (H) 2007    right side - lumpectomy, chemo, and local radiation     Chronic infection     infection/wound for two years after esoph surgery     Esophageal perforation      Fibromyalgia      GERD (gastroesophageal reflux disease)      Hiatal hernia      History of blood transfusion      IBS  (irritable bowel syndrome)      Meniere's disease     deaf left ear     MS (multiple sclerosis) (H)      Noninfectious ileitis      Other chronic pain        Past Surgical History:   Procedure Laterality Date     APPENDECTOMY       BACK SURGERY  5/1/2015    lumbar fusion     BRONCHOSCOPY FLEXIBLE N/A 4/17/2017    Procedure: BRONCHOSCOPY FLEXIBLE;;  Surgeon: Jens Wise MD;  [facility]     C GASTROSTOMY/JEJUN TUBE      J tube for feedings     CLOSE SPIT FISTULA N/A 4/17/2017    Procedure: CLOSE SPIT FISTULA;;  Surgeon: Jens Wise MD;  [facility]     COMBINED ESOPHAGOSCOPY, GASTROSCOPY, DUODENOSCOPY (EGD), REMOVE ESOPHAGEAL STENT N/A 8/26/2016    Procedure: COMBINED ESOPHAGOSCOPY, GASTROSCOPY, DUODENOSCOPY (EGD), REMOVE ESOPHAGEAL STENT;  Surgeon: Jens Wise MD;  [facility]     CREATE SPIT FISTULA N/A 1/9/2017    Procedure: CREATE SPIT FISTULA;  Surgeon: Jens Wise MD;  [facility]     ESOPHAGECTOMY N/A 1/9/2017    Procedure: ESOPHAGECTOMY;  Surgeon: Jens Wise MD;  [facility]     ESOPHAGOSCOPY, GASTROSCOPY, DUODENOSCOPY (EGD), COMBINED N/A 4/18/2016    Procedure: COMBINED ESOPHAGOSCOPY, GASTROSCOPY, DUODENOSCOPY (EGD);  Surgeon: Alexis Barraza MD;  [facility]     ESOPHAGOSCOPY, GASTROSCOPY, DUODENOSCOPY (EGD), COMBINED N/A 4/25/2016    Procedure: COMBINED ESOPHAGOSCOPY, GASTROSCOPY, DUODENOSCOPY (EGD);  Surgeon: Alexis Barraza MD;  [facility]     ESOPHAGOSCOPY, GASTROSCOPY, DUODENOSCOPY (EGD), COMBINED N/A 5/4/2016    Procedure: COMBINED ESOPHAGOSCOPY, GASTROSCOPY, DUODENOSCOPY (EGD);  Surgeon: Alexis Barraza MD;  [facility]     ESOPHAGOSCOPY, GASTROSCOPY, DUODENOSCOPY (EGD), COMBINED N/A 5/18/2016    Procedure: COMBINED ESOPHAGOSCOPY, GASTROSCOPY, DUODENOSCOPY (EGD);  Surgeon: Alexis Barraza MD;  [facility]     ESOPHAGOSCOPY, GASTROSCOPY, DUODENOSCOPY  (EGD), COMBINED N/A 6/22/2016    Procedure: COMBINED ESOPHAGOSCOPY, GASTROSCOPY, DUODENOSCOPY (EGD);  Surgeon: Alexis Barraza MD;  [facility]     ESOPHAGOSCOPY, GASTROSCOPY, DUODENOSCOPY (EGD), COMBINED N/A 7/12/2016    Procedure: COMBINED ESOPHAGOSCOPY, GASTROSCOPY, DUODENOSCOPY (EGD);  Surgeon: Jens Wise MD;  [facility]     ESOPHAGOSCOPY, GASTROSCOPY, DUODENOSCOPY (EGD), COMBINED N/A 4/21/2017    Procedure: COMBINED ESOPHAGOSCOPY, GASTROSCOPY, DUODENOSCOPY (EGD);  Esophagogastroduodenoscopy, Pharyngostomy Tube Placement ;  Surgeon: Jens Wise MD;  [facility]     ESOPHAGOSCOPY, GASTROSCOPY, DUODENOSCOPY (EGD), COMBINED N/A 5/19/2017    Procedure: COMBINED ESOPHAGOSCOPY, GASTROSCOPY, DUODENOSCOPY (EGD);  Upper Endoscopy, Irrigation and Debridement of Chest Wound ;  Surgeon: Nalini Barreto MD;  [facility]     ESOPHAGOSCOPY, GASTROSCOPY, DUODENOSCOPY (EGD), COMBINED N/A 5/23/2017    Procedure: COMBINED ESOPHAGOSCOPY, GASTROSCOPY, DUODENOSCOPY (EGD);;  Surgeon: Nalini Barreto MD;  [facility]     ESOPHAGOSCOPY, GASTROSCOPY, DUODENOSCOPY (EGD), DILATATION, COMBINED N/A 6/29/2016    Procedure: COMBINED ESOPHAGOSCOPY, GASTROSCOPY, DUODENOSCOPY (EGD), DILATATION;  Surgeon: Jens Wise MD;  [facility]     EXPLORE NECK N/A 5/19/2017    Procedure: EXPLORE NECK;;  Surgeon: Nalini Barreto MD;  [facility]     HC UGI ENDOSCOPY W TRANSENDOSCOPIC STENT PLACEMENT N/A 7/12/2016    Procedure: COMBINED ESOPHAGOSCOPY, GASTROSCOPY, DUODENOSCOPY (EGD), PLACE TRANSENDOSCOPIC ESOPHAGEAL STENT;  Surgeon: Jens Wise MD;  [facility]     HC UGI ENDOSCOPY W TRANSENDOSCOPIC STENT PLACEMENT N/A 7/22/2016    Procedure: COMBINED ESOPHAGOSCOPY, GASTROSCOPY, DUODENOSCOPY (EGD), PLACE TRANSENDOSCOPIC ESOPHAGEAL STENT;  Surgeon: Jens Wise MD;  [facility]     IRRIGATION AND DEBRIDEMENT CHEST WASHOUT, COMBINED N/A  6/29/2016    Procedure: COMBINED IRRIGATION AND DEBRIDEMENT CHEST WASHOUT;  Surgeon: Jens Wise MD;  [facility]     IRRIGATION AND DEBRIDEMENT CHEST WASHOUT, COMBINED N/A 5/23/2017    Procedure: COMBINED IRRIGATION AND DEBRIDEMENT CHEST WASHOUT;  COMBINED IRRIGATION AND DEBRIDEMENT CHEST WASHOUT,COMBINED ESOPHAGOSCOPY, GASTROSCOPY, DUODENOSCOPY (EGD) ;  Surgeon: Nalini Barreto MD;  [facility]     IRRIGATION AND DEBRIDEMENT CHEST WASHOUT, COMBINED N/A 5/24/2017    Procedure: COMBINED IRRIGATION AND DEBRIDEMENT CHEST WASHOUT;  Chest wound Washout and Dressing Change;  Surgeon: Nalini Barreto MD;  [facility]     IRRIGATION AND DEBRIDEMENT CHEST WASHOUT, COMBINED N/A 5/25/2017    Procedure: COMBINED IRRIGATION AND DEBRIDEMENT CHEST WASHOUT;  Irrigation and Debridement Chest Washout and dressing change;  Surgeon: Nalini Barreto MD;  [facility]     LAPAROSCOPIC ASSISTED INSERTION TUBE JEJUNOSTOMY N/A 4/17/2017    Procedure: LAPAROSCOPIC ASSISTED INSERTION TUBE JEJUNOSTOMY;;  Surgeon: Jens Wise MD;  [facility]     LAPAROSCOPY DIAGNOSTIC (GENERAL) N/A 1/9/2017    Procedure: LAPAROSCOPY DIAGNOSTIC (GENERAL);  Surgeon: Jens Wise MD;  [facility]     LAPAROSCOPY DIAGNOSTIC (GENERAL) N/A 4/17/2017    Procedure: LAPAROSCOPY DIAGNOSTIC (GENERAL);  Laparoscopic , Neck Dissection, Spit Fistula Takedown, Laparoscopic Jejunostomy Tube and Pharyngostomy Tube, Gastric Pull up, Upper Endoscopy(EGD)  , Flexible Bronchoscopy ,Sternotomy ;  Surgeon: Jens Wise MD;  [facility]     LUMPECTOMY BREAST Right 2007     NERVE BLOCK PERIPHERAL N/A 8/30/2016    Procedure: NERVE BLOCK PERIPHERAL;  Surgeon: GENERIC ANESTHESIA PROVIDER;  [facility]     NISSEN FUNDOPLICATION  1/2016     PHARYNGOSTOMY N/A 4/18/2016    Procedure: PHARYNGOSTOMY;  Surgeon: Alexis Barraza MD;  [facility]     PHARYNGOSTOMY N/A 4/25/2016    Procedure:  "PHARYNGOSTOMY;  Surgeon: Alexis Barraza MD;  [facility]     PHARYNGOSTOMY N/A 2016    Procedure: PHARYNGOSTOMY;  Surgeon: Alexis Barraza MD;  [facility]     PHARYNGOSTOMY N/A 2016    Procedure: PHARYNGOSTOMY;  Surgeon: Alexis Barraza MD;  [facility]     PHARYNGOSTOMY N/A 2016    Procedure: PHARYNGOSTOMY;  Surgeon: Jens Wise MD;  [facility]     PHARYNGOSTOMY N/A 2017    Procedure: PHARYNGOSTOMY;;  Surgeon: Jens Wise MD;  [facility]     PICC INSERTION Left 2016    5fr DL BioFlo PICC, 42cm (3cm external) in the L medial brachial vein w/ tip in the SVC RA junction.     THORACOTOMY Right     lung infection - \"hard crust formed on lung\"     THORACOTOMY Left 2017    Procedure: THORACOTOMY;  Surgeon: Jens Wise MD;  [facility]     WRIST SURGERY           Current Facility-Administered Medications on File Prior to Encounter:  bupivacaine 0.25 % - EPINEPHrine 1:200,000 injection     Current Outpatient Prescriptions on File Prior to Encounter:  oxyCODONE (ROXICODONE) 10 MG IR tablet Take 1 to 1.5 tablet every 3-4 hours PRN pain   diphenoxylate-atropine (LOMOTIL) 0.5-0.005 mg/mL liquid Take 5 mLs by mouth 4 times daily as needed for diarrhea   fiber modular (NUTRISOURCE FIBER) packet 1 packet by Per Feeding Tube route 3 times daily (with meals)   metoprolol (LOPRESSOR) 10 mg/mL SUSP 2.5 mLs (25 mg) by Per J Tube route 2 times daily   opium tincture 10 mg/mL (1%) liquid Take 0.6 mLs (6 mg) by mouth every 6 hours   ferrous sulfate 300 (60 FE) MG/5ML syrup 5 mLs (300 mg) by Per J Tube route daily   gabapentin (NEURONTIN) 250 MG/5ML solution Take 6 mLs (300 mg) by mouth every 8 hours   traZODone (DESYREL) 100 MG tablet 0.5 tablets (50 mg) by Per G Tube route At Bedtime (Patient taking differently: 100 mg by Per G Tube route At Bedtime )   [] simethicone (MYLICON) 40 MG/0.6ML " suspension 0.6 mLs (40 mg) by Per Feeding Tube route every 6 hours as needed for cramping   ranitidine (ZANTAC) 150 MG/10ML syrup Take 10 mLs (150 mg) by mouth 2 times daily   warfarin (COUMADIN) 2.5 MG tablet Take 1 tablet (2.5 mg) by mouth daily (Patient not taking: Reported on 5/18/2017)   order for DME Equipment being ordered: [facility] Suction Machine-IntermittentSuction Canisters(2)Suction Canister Holders(2)Suction Connect Tube(2)5 in 1 Connector(2)Bacteria Filter(2)Yaunkauer Suction(2)Treatment Diagnosis: Esophogeal Perforation, s/p esophagectomy   order for DME Equipment being ordered: Suction PumpSuction Canister(2)Suction Tubing(2)Bacteria Filter(2)[employer]auer(4)Red Rubber Catheter(2)Treatment Diagnosis: Esophogeal Perforation, s/p esophagectomy   DiphenhydrAMINE HCl (BENADRYL PO) Take 25 mg by mouth nightly as needed   cholestyramine (QUESTRAN) 4 G Packet Take 1 packet by mouth daily   multivitamins with minerals (CERTAVITE/CEROVITE) LIQD liquid Take 15 mLs by mouth daily   loperamide (IMODIUM) 1 MG/5ML liquid Take 20 mLs (4 mg) by mouth 4 times daily as needed for diarrhea (Patient taking differently: Take 4 mg by mouth 2 times daily )   Lactobacillus Rhamnosus, GG, (CULTURELLE PO) Take 1 tablet by mouth 2 times daily        SH:   Social History   Substance Use Topics     Smoking status: Former Smoker     Packs/day: 1.00     Years: 15.00     Types: Cigarettes     Quit date: 1/1/1998     Smokeless tobacco: Not on file     Alcohol use No       Allergies:   Allergies   Allergen Reactions     Amoxicillin Diarrhea     Ativan [Lorazepam] Other (See Comments)     Hallucinations     Hydromorphone Itching     4/12/17 - patient open to using this as she tolerated Hydromorphone PCA during hospitalization in January 2017.      Morphine Itching       NPO Status: Per ASA Guidelines    Labs:    Blood Bank:  Lab Results   Component Value Date    ABO A 05/27/2017    RH  Neg 05/27/2017    AS Neg 05/27/2017     BMP:  Recent  Labs   Lab Test  05/29/17   0623   NA  144   POTASSIUM  3.4   CHLORIDE  113*   CO2  24   BUN  2*   CR  0.45*   GLC  78   ANNABELLE  7.9*     CBC:   Recent Labs   Lab Test  05/29/17   0133   WBC  7.7   RBC  2.77*   HGB  7.7*   HCT  25.5*   MCV  92   MCH  27.8   MCHC  30.2*   RDW  17.5*   PLT  490*     Coags:  Recent Labs   Lab Test  05/29/17   0623  05/27/17   0948   05/19/17   1650   INR  1.51*  1.43*   < >  1.87*   PTT   --   65*   --   54*   FIBR   --    --    --   497*    < > = values in this interval not displayed.         Physical Exam  Normal systems: cardiovascular and dental    Airway   Mallampati: I  TM distance: >3 FB  Neck ROM: full    Dental     Cardiovascular   Rhythm and rate: regular and normal      Pulmonary (+) decreased breath sounds                       Anesthesia Plan      History & Physical Review  History and physical reviewed and following examination; no interval change.    ASA Status:  3 .    NPO Status:  > 8 hours    Plan for MAC with Intravenous and Propofol induction. Maintenance will be TIVA.  Reason for MAC:  Other - see comments (Will use local)  PONV prophylaxis:  Ondansetron (or other 5HT-3)  To be discussed with staff.   - ASA 3  - MAC with standard ASA monitors, IV induction, balanced anesthetic/ propofol TIVA  - PIVx1  - Antibiotics per surgery  - PONV prophylaxis        Postoperative Care  Postoperative pain management:  IV analgesics.      Consents  Anesthetic plan, risks, benefits and alternatives discussed with:  Patient.  Use of blood products discussed: No .   .        Emperatriz Abdul MD  Anesthesiology resident   [facility]

## 2017-05-30 ENCOUNTER — APPOINTMENT (OUTPATIENT)
Dept: OCCUPATIONAL THERAPY | Facility: CLINIC | Age: 72
DRG: 856 | End: 2017-05-30
Attending: STUDENT IN AN ORGANIZED HEALTH CARE EDUCATION/TRAINING PROGRAM
Payer: MEDICARE

## 2017-05-30 ENCOUNTER — ANESTHESIA (OUTPATIENT)
Dept: SURGERY | Facility: CLINIC | Age: 72
DRG: 856 | End: 2017-05-30
Payer: MEDICARE

## 2017-05-30 ENCOUNTER — ANESTHESIA EVENT (OUTPATIENT)
Dept: SURGERY | Facility: CLINIC | Age: 72
DRG: 856 | End: 2017-05-30
Payer: MEDICARE

## 2017-05-30 ENCOUNTER — APPOINTMENT (OUTPATIENT)
Dept: ULTRASOUND IMAGING | Facility: CLINIC | Age: 72
DRG: 856 | End: 2017-05-30
Attending: STUDENT IN AN ORGANIZED HEALTH CARE EDUCATION/TRAINING PROGRAM
Payer: MEDICARE

## 2017-05-30 LAB
ANION GAP SERPL CALCULATED.3IONS-SCNC: 9 MMOL/L (ref 3–14)
BUN SERPL-MCNC: 4 MG/DL (ref 7–30)
CALCIUM SERPL-MCNC: 7.8 MG/DL (ref 8.5–10.1)
CHLORIDE SERPL-SCNC: 106 MMOL/L (ref 94–109)
CO2 SERPL-SCNC: 25 MMOL/L (ref 20–32)
CREAT SERPL-MCNC: 0.39 MG/DL (ref 0.52–1.04)
GFR SERPL CREATININE-BSD FRML MDRD: ABNORMAL ML/MIN/1.7M2
GLUCOSE SERPL-MCNC: 97 MG/DL (ref 70–99)
MAGNESIUM SERPL-MCNC: 2.3 MG/DL (ref 1.6–2.3)
PHOSPHATE SERPL-MCNC: 2.6 MG/DL (ref 2.5–4.5)
POTASSIUM SERPL-SCNC: 3.3 MMOL/L (ref 3.4–5.3)
PREALB SERPL IA-MCNC: 7 MG/DL (ref 15–45)
SODIUM SERPL-SCNC: 140 MMOL/L (ref 133–144)

## 2017-05-30 PROCEDURE — 25000132 ZZH RX MED GY IP 250 OP 250 PS 637: Mod: GY | Performed by: STUDENT IN AN ORGANIZED HEALTH CARE EDUCATION/TRAINING PROGRAM

## 2017-05-30 PROCEDURE — 27210429 ZZH NUTRITION PRODUCT INTERMEDIATE LITER

## 2017-05-30 PROCEDURE — 25000128 H RX IP 250 OP 636: Performed by: THORACIC SURGERY (CARDIOTHORACIC VASCULAR SURGERY)

## 2017-05-30 PROCEDURE — 84100 ASSAY OF PHOSPHORUS: CPT | Performed by: STUDENT IN AN ORGANIZED HEALTH CARE EDUCATION/TRAINING PROGRAM

## 2017-05-30 PROCEDURE — 97535 SELF CARE MNGMENT TRAINING: CPT | Mod: GO

## 2017-05-30 PROCEDURE — 37000009 ZZH ANESTHESIA TECHNICAL FEE, EACH ADDTL 15 MIN: Performed by: STUDENT IN AN ORGANIZED HEALTH CARE EDUCATION/TRAINING PROGRAM

## 2017-05-30 PROCEDURE — 97530 THERAPEUTIC ACTIVITIES: CPT | Mod: GO

## 2017-05-30 PROCEDURE — A9270 NON-COVERED ITEM OR SERVICE: HCPCS | Mod: GY | Performed by: SURGERY

## 2017-05-30 PROCEDURE — 83735 ASSAY OF MAGNESIUM: CPT | Performed by: STUDENT IN AN ORGANIZED HEALTH CARE EDUCATION/TRAINING PROGRAM

## 2017-05-30 PROCEDURE — 84132 ASSAY OF SERUM POTASSIUM: CPT | Performed by: STUDENT IN AN ORGANIZED HEALTH CARE EDUCATION/TRAINING PROGRAM

## 2017-05-30 PROCEDURE — 40000802 ZZH SITE CHECK

## 2017-05-30 PROCEDURE — 36000057 ZZH SURGERY LEVEL 3 1ST 30 MIN - UMMC: Performed by: STUDENT IN AN ORGANIZED HEALTH CARE EDUCATION/TRAINING PROGRAM

## 2017-05-30 PROCEDURE — 37000008 ZZH ANESTHESIA TECHNICAL FEE, 1ST 30 MIN: Performed by: STUDENT IN AN ORGANIZED HEALTH CARE EDUCATION/TRAINING PROGRAM

## 2017-05-30 PROCEDURE — 25000128 H RX IP 250 OP 636: Performed by: STUDENT IN AN ORGANIZED HEALTH CARE EDUCATION/TRAINING PROGRAM

## 2017-05-30 PROCEDURE — 80048 BASIC METABOLIC PNL TOTAL CA: CPT | Performed by: STUDENT IN AN ORGANIZED HEALTH CARE EDUCATION/TRAINING PROGRAM

## 2017-05-30 PROCEDURE — 25000132 ZZH RX MED GY IP 250 OP 250 PS 637: Mod: GY | Performed by: SURGERY

## 2017-05-30 PROCEDURE — 36592 COLLECT BLOOD FROM PICC: CPT | Performed by: STUDENT IN AN ORGANIZED HEALTH CARE EDUCATION/TRAINING PROGRAM

## 2017-05-30 PROCEDURE — 27210794 ZZH OR GENERAL SUPPLY STERILE: Performed by: STUDENT IN AN ORGANIZED HEALTH CARE EDUCATION/TRAINING PROGRAM

## 2017-05-30 PROCEDURE — 93971 EXTREMITY STUDY: CPT | Mod: RT

## 2017-05-30 PROCEDURE — 0JD60ZZ EXTRACTION OF CHEST SUBCUTANEOUS TISSUE AND FASCIA, OPEN APPROACH: ICD-10-PCS | Performed by: STUDENT IN AN ORGANIZED HEALTH CARE EDUCATION/TRAINING PROGRAM

## 2017-05-30 PROCEDURE — 40000556 ZZH STATISTIC PERIPHERAL IV START W US GUIDANCE

## 2017-05-30 PROCEDURE — 40000558 ZZH STATISTIC CVC DRESSING CHANGE

## 2017-05-30 PROCEDURE — 25000125 ZZHC RX 250: Performed by: ANESTHESIOLOGY

## 2017-05-30 PROCEDURE — 12000003 ZZH R&B CRITICAL UMMC

## 2017-05-30 PROCEDURE — 25000128 H RX IP 250 OP 636: Performed by: ANESTHESIOLOGY

## 2017-05-30 PROCEDURE — 40000170 ZZH STATISTIC PRE-PROCEDURE ASSESSMENT II: Performed by: STUDENT IN AN ORGANIZED HEALTH CARE EDUCATION/TRAINING PROGRAM

## 2017-05-30 PROCEDURE — 36000059 ZZH SURGERY LEVEL 3 EA 15 ADDTL MIN UMMC: Performed by: STUDENT IN AN ORGANIZED HEALTH CARE EDUCATION/TRAINING PROGRAM

## 2017-05-30 PROCEDURE — A9270 NON-COVERED ITEM OR SERVICE: HCPCS | Mod: GY | Performed by: STUDENT IN AN ORGANIZED HEALTH CARE EDUCATION/TRAINING PROGRAM

## 2017-05-30 PROCEDURE — 40000133 ZZH STATISTIC OT WARD VISIT

## 2017-05-30 RX ORDER — FENTANYL CITRATE 50 UG/ML
INJECTION, SOLUTION INTRAMUSCULAR; INTRAVENOUS PRN
Status: DISCONTINUED | OUTPATIENT
Start: 2017-05-30 | End: 2017-05-30

## 2017-05-30 RX ORDER — PROPOFOL 10 MG/ML
INJECTION, EMULSION INTRAVENOUS CONTINUOUS PRN
Status: DISCONTINUED | OUTPATIENT
Start: 2017-05-30 | End: 2017-05-30

## 2017-05-30 RX ORDER — PROPOFOL 10 MG/ML
INJECTION, EMULSION INTRAVENOUS PRN
Status: DISCONTINUED | OUTPATIENT
Start: 2017-05-30 | End: 2017-05-30

## 2017-05-30 RX ORDER — POTASSIUM CHLORIDE 20MEQ/15ML
20 LIQUID (ML) ORAL 2 TIMES DAILY
Status: DISCONTINUED | OUTPATIENT
Start: 2017-05-30 | End: 2017-05-30

## 2017-05-30 RX ORDER — ONDANSETRON 2 MG/ML
INJECTION INTRAMUSCULAR; INTRAVENOUS PRN
Status: DISCONTINUED | OUTPATIENT
Start: 2017-05-30 | End: 2017-05-30

## 2017-05-30 RX ADMIN — Medication 10 ML: at 15:34

## 2017-05-30 RX ADMIN — Medication 6 MG: at 15:34

## 2017-05-30 RX ADMIN — TRAZODONE HYDROCHLORIDE 100 MG: 100 TABLET ORAL at 21:39

## 2017-05-30 RX ADMIN — LOPERAMIDE HYDROCHLORIDE 4 MG: 1 SOLUTION ORAL at 21:49

## 2017-05-30 RX ADMIN — HYDROMORPHONE HYDROCHLORIDE 0.5 MG: 1 INJECTION, SOLUTION INTRAMUSCULAR; INTRAVENOUS; SUBCUTANEOUS at 14:03

## 2017-05-30 RX ADMIN — PROPOFOL 30 MG: 10 INJECTION, EMULSION INTRAVENOUS at 10:54

## 2017-05-30 RX ADMIN — FLUCONAZOLE 200 MG: 2 INJECTION INTRAVENOUS at 21:46

## 2017-05-30 RX ADMIN — ACETAMINOPHEN 975 MG: 160 SUSPENSION ORAL at 12:35

## 2017-05-30 RX ADMIN — HEPARIN SODIUM 5000 UNITS: 5000 INJECTION, SOLUTION INTRAVENOUS; SUBCUTANEOUS at 21:37

## 2017-05-30 RX ADMIN — MINERAL SUPPLEMENT IRON 300 MG / 5 ML STRENGTH LIQUID 100 PER BOX UNFLAVORED 300 MG: at 12:35

## 2017-05-30 RX ADMIN — ACETAMINOPHEN 975 MG: 160 SUSPENSION ORAL at 00:04

## 2017-05-30 RX ADMIN — PROPOFOL 20 MG: 10 INJECTION, EMULSION INTRAVENOUS at 10:56

## 2017-05-30 RX ADMIN — GABAPENTIN 300 MG: 250 SOLUTION ORAL at 03:52

## 2017-05-30 RX ADMIN — Medication 10 ML: at 21:40

## 2017-05-30 RX ADMIN — Medication 6 MG: at 03:52

## 2017-05-30 RX ADMIN — METOPROLOL TARTRATE 25 MG: 100 TABLET ORAL at 12:39

## 2017-05-30 RX ADMIN — HYDROMORPHONE HYDROCHLORIDE 0.5 MG: 1 INJECTION, SOLUTION INTRAMUSCULAR; INTRAVENOUS; SUBCUTANEOUS at 07:59

## 2017-05-30 RX ADMIN — OXYCODONE HYDROCHLORIDE 15 MG: 5 SOLUTION ORAL at 12:20

## 2017-05-30 RX ADMIN — GABAPENTIN 300 MG: 250 SOLUTION ORAL at 21:40

## 2017-05-30 RX ADMIN — ACETAMINOPHEN 975 MG: 160 SUSPENSION ORAL at 15:33

## 2017-05-30 RX ADMIN — POTASSIUM CHLORIDE 20 MEQ: 1.5 POWDER, FOR SOLUTION ORAL at 17:39

## 2017-05-30 RX ADMIN — OXYCODONE HYDROCHLORIDE 15 MG: 5 SOLUTION ORAL at 21:39

## 2017-05-30 RX ADMIN — Medication 1 PACKET: at 14:04

## 2017-05-30 RX ADMIN — PANTOPRAZOLE SODIUM 40 MG: 40 TABLET, DELAYED RELEASE ORAL at 12:34

## 2017-05-30 RX ADMIN — PROPOFOL 50 MCG/KG/MIN: 10 INJECTION, EMULSION INTRAVENOUS at 10:46

## 2017-05-30 RX ADMIN — OXYCODONE HYDROCHLORIDE 15 MG: 5 SOLUTION ORAL at 18:34

## 2017-05-30 RX ADMIN — HYDROMORPHONE HYDROCHLORIDE 0.5 MG: 1 INJECTION, SOLUTION INTRAMUSCULAR; INTRAVENOUS; SUBCUTANEOUS at 11:43

## 2017-05-30 RX ADMIN — SIMETHICONE 40 MG: 20 SUSPENSION/ DROPS ORAL at 19:29

## 2017-05-30 RX ADMIN — FENTANYL CITRATE 50 MCG: 50 INJECTION, SOLUTION INTRAMUSCULAR; INTRAVENOUS at 10:56

## 2017-05-30 RX ADMIN — Medication 6 MG: at 21:43

## 2017-05-30 RX ADMIN — METOPROLOL TARTRATE 25 MG: 100 TABLET ORAL at 21:41

## 2017-05-30 RX ADMIN — PIPERACILLIN SODIUM,TAZOBACTAM SODIUM 3.38 G: 3; .375 INJECTION, POWDER, FOR SOLUTION INTRAVENOUS at 23:19

## 2017-05-30 RX ADMIN — POTASSIUM CHLORIDE 40 MEQ: 1.5 POWDER, FOR SOLUTION ORAL at 15:35

## 2017-05-30 RX ADMIN — LOPERAMIDE HYDROCHLORIDE 4 MG: 1 SOLUTION ORAL at 15:36

## 2017-05-30 RX ADMIN — OXYCODONE HYDROCHLORIDE 15 MG: 5 SOLUTION ORAL at 00:10

## 2017-05-30 RX ADMIN — Medication 10 ML: at 12:21

## 2017-05-30 RX ADMIN — HEPARIN SODIUM 5000 UNITS: 5000 INJECTION, SOLUTION INTRAVENOUS; SUBCUTANEOUS at 03:52

## 2017-05-30 RX ADMIN — Medication 1 PACKET: at 21:45

## 2017-05-30 RX ADMIN — PIPERACILLIN SODIUM,TAZOBACTAM SODIUM 3.38 G: 3; .375 INJECTION, POWDER, FOR SOLUTION INTRAVENOUS at 11:47

## 2017-05-30 RX ADMIN — LOPERAMIDE HYDROCHLORIDE 4 MG: 1 SOLUTION ORAL at 12:48

## 2017-05-30 RX ADMIN — CHOLESTYRAMINE 4 G: 4 POWDER, FOR SUSPENSION ORAL at 15:37

## 2017-05-30 RX ADMIN — OXYCODONE HYDROCHLORIDE 15 MG: 5 SOLUTION ORAL at 03:52

## 2017-05-30 RX ADMIN — HEPARIN SODIUM 5000 UNITS: 5000 INJECTION, SOLUTION INTRAVENOUS; SUBCUTANEOUS at 14:03

## 2017-05-30 RX ADMIN — PIPERACILLIN SODIUM,TAZOBACTAM SODIUM 3.38 G: 3; .375 INJECTION, POWDER, FOR SOLUTION INTRAVENOUS at 17:39

## 2017-05-30 RX ADMIN — PROPOFOL 40 MG: 10 INJECTION, EMULSION INTRAVENOUS at 10:46

## 2017-05-30 RX ADMIN — OXYCODONE HYDROCHLORIDE 15 MG: 5 SOLUTION ORAL at 06:32

## 2017-05-30 RX ADMIN — GABAPENTIN 300 MG: 250 SOLUTION ORAL at 14:03

## 2017-05-30 RX ADMIN — FENTANYL CITRATE 50 MCG: 50 INJECTION, SOLUTION INTRAMUSCULAR; INTRAVENOUS at 10:46

## 2017-05-30 RX ADMIN — PIPERACILLIN SODIUM,TAZOBACTAM SODIUM 3.38 G: 3; .375 INJECTION, POWDER, FOR SOLUTION INTRAVENOUS at 03:53

## 2017-05-30 RX ADMIN — ONDANSETRON 4 MG: 2 INJECTION INTRAMUSCULAR; INTRAVENOUS at 10:46

## 2017-05-30 RX ADMIN — OXYCODONE HYDROCHLORIDE 15 MG: 5 SOLUTION ORAL at 15:36

## 2017-05-30 RX ADMIN — FENTANYL CITRATE 50 MCG: 50 INJECTION, SOLUTION INTRAMUSCULAR; INTRAVENOUS at 10:53

## 2017-05-30 ASSESSMENT — ENCOUNTER SYMPTOMS: DYSRHYTHMIAS: 1

## 2017-05-30 NOTE — PLAN OF CARE
Problem: Goal Outcome Summary  Goal: Goal Outcome Summary  OT/7B:  Instructed pt on compensatory techniques for UB dressing within precautions, performs with min A after education.  Mod I for toileting using BSC.  SBA for STS and ambulation ~400' with single UE support on IV pole, requires 1 standing and 1 seated rest break due to fatigue.  Limited by sternal precautions, pain, endurance, balance, and strength.      REC:  Home with assist from spouse PRN and continued HHRN and HHPT to address remaining deficits in strength and endurance.

## 2017-05-30 NOTE — PLAN OF CARE
Problem: Goal Outcome Summary  Goal: Goal Outcome Summary  Outcome: No Change  Patient is alert and oriented. VSS on room air. Pain fairly controlled c/o sharp pain to chest. Received scheduled oxycodone. Dressing reinforced to chest. Patient up independently. Continues to have watery stools. Patient believes that they have increased this evening. Also complains of abdominal cramping. Questioning when and how it will stop. Magnesium replaced. TPN and Lipids running.  Will continue to monitor and plan of care.

## 2017-05-30 NOTE — ANESTHESIA PREPROCEDURE EVALUATION
Anesthesia Evaluation     . Pt has had prior anesthetic. Type: General and MAC    No history of anesthetic complications          ROS/MED HX    ENT/Pulmonary:  - neg pulmonary ROS     Neurologic: Comment: Meniere's Disease     (+)Multiple Sclerosis     Cardiovascular:  - neg cardiovascular ROS   (+) ----. Taking blood thinners : . . . :. dysrhythmias a-fib, . Previous cardiac testing Echodate:results:Interpretation Summary  Technically difficult study.  Global and regional left ventricular function is hyperdynamic with an LVEF  >70%.  The right ventricular function is hyperdynamic.  Mild pulmonary hypertension is present.  The inferior vena cava is normal. The estimated mean right atrial pressure is  <3 mmHg.  No pericardial effusion is present.  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 complicated by esophageal leak and mediastinal abscess s/p serial washouts under MAC; hiatal hernia; IBS    (+) GERD hiatal hernia,       Renal/Genitourinary:  - ROS Renal section negative       Endo:  - neg endo ROS       Psychiatric:  - neg psychiatric ROS       Infectious Disease: Comment: See GI  - neg infectious disease ROS       Malignancy:   (+) Malignancy History of Breast          Other: Comment: Fibromyalgia                   Procedure: Procedure(s):  Irrigation and Debridement Chest Washout  - Wound Class:     HPI: Minerva Blanco is a 72 year old female with complex PMH as noted above presenting for repeat I&D chest washout for chronic esophogeal perforation. Had last washout on 5/30/17.    PMHx/PSHx:  Past Medical History:   Diagnosis Date     Atrial fibrillation (H)      Breast cancer (H) 2007    right side -  lumpectomy, chemo, and local radiation     Chronic infection     infection/wound for two years after esoph surgery     Esophageal perforation      Fibromyalgia      GERD (gastroesophageal reflux disease)      Hiatal hernia      History of blood transfusion      IBS (irritable bowel syndrome)      Meniere's disease     deaf left ear     MS (multiple sclerosis) (H)      Noninfectious ileitis      Other chronic pain        Past Surgical History:   Procedure Laterality Date     APPENDECTOMY       BACK SURGERY  5/1/2015    lumbar fusion     BRONCHOSCOPY FLEXIBLE N/A 4/17/2017    Procedure: BRONCHOSCOPY FLEXIBLE;;  Surgeon: Jens Wise MD;  Location: [facility]     C GASTROSTOMY/JEJUN TUBE      J tube for feedings     CLOSE SPIT FISTULA N/A 4/17/2017    Procedure: CLOSE SPIT FISTULA;;  Surgeon: Jens Wise MD;  [facility]     COMBINED ESOPHAGOSCOPY, GASTROSCOPY, DUODENOSCOPY (EGD), REMOVE ESOPHAGEAL STENT N/A 8/26/2016    Procedure: COMBINED ESOPHAGOSCOPY, GASTROSCOPY, DUODENOSCOPY (EGD), REMOVE ESOPHAGEAL STENT;  Surgeon: Jens Wise MD;  [facility]     CREATE SPIT FISTULA N/A 1/9/2017    Procedure: CREATE SPIT FISTULA;  Surgeon: Jens Wise MD;  [facility]     ESOPHAGECTOMY N/A 1/9/2017    Procedure: ESOPHAGECTOMY;  Surgeon: Jens Wise MD;  [facility]     ESOPHAGOSCOPY, GASTROSCOPY, DUODENOSCOPY (EGD), COMBINED N/A 4/18/2016    Procedure: COMBINED ESOPHAGOSCOPY, GASTROSCOPY, DUODENOSCOPY (EGD);  Surgeon: Alexis Barraza MD;  [facility]     ESOPHAGOSCOPY, GASTROSCOPY, DUODENOSCOPY (EGD), COMBINED N/A 4/25/2016    Procedure: COMBINED ESOPHAGOSCOPY, GASTROSCOPY, DUODENOSCOPY (EGD);  Surgeon: Alexis Barraza MD;  [facility]     ESOPHAGOSCOPY, GASTROSCOPY, DUODENOSCOPY (EGD), COMBINED N/A 5/4/2016    Procedure: COMBINED ESOPHAGOSCOPY, GASTROSCOPY, DUODENOSCOPY (EGD);  Surgeon: Alexis Barraza MD;   [facility]     ESOPHAGOSCOPY, GASTROSCOPY, DUODENOSCOPY (EGD), COMBINED N/A 5/18/2016    Procedure: COMBINED ESOPHAGOSCOPY, GASTROSCOPY, DUODENOSCOPY (EGD);  Surgeon: Alexis Barraza MD;  [facility]     ESOPHAGOSCOPY, GASTROSCOPY, DUODENOSCOPY (EGD), COMBINED N/A 6/22/2016    Procedure: COMBINED ESOPHAGOSCOPY, GASTROSCOPY, DUODENOSCOPY (EGD);  Surgeon: Alexis Barraza MD;  [facility]     ESOPHAGOSCOPY, GASTROSCOPY, DUODENOSCOPY (EGD), COMBINED N/A 7/12/2016    Procedure: COMBINED ESOPHAGOSCOPY, GASTROSCOPY, DUODENOSCOPY (EGD);  Surgeon: Jens Wise MD;  [facility]     ESOPHAGOSCOPY, GASTROSCOPY, DUODENOSCOPY (EGD), COMBINED N/A 4/21/2017    Procedure: COMBINED ESOPHAGOSCOPY, GASTROSCOPY, DUODENOSCOPY (EGD);  Esophagogastroduodenoscopy, Pharyngostomy Tube Placement ;  Surgeon: Jens Wise MD;  [facility]     ESOPHAGOSCOPY, GASTROSCOPY, DUODENOSCOPY (EGD), COMBINED N/A 5/19/2017    Procedure: COMBINED ESOPHAGOSCOPY, GASTROSCOPY, DUODENOSCOPY (EGD);  Upper Endoscopy, Irrigation and Debridement of Chest Wound ;  Surgeon: Nalini Barreto MD;  [facility]     ESOPHAGOSCOPY, GASTROSCOPY, DUODENOSCOPY (EGD), COMBINED N/A 5/23/2017    Procedure: COMBINED ESOPHAGOSCOPY, GASTROSCOPY, DUODENOSCOPY (EGD);;  Surgeon: Nalini Barreto MD;  [facility]     ESOPHAGOSCOPY, GASTROSCOPY, DUODENOSCOPY (EGD), DILATATION, COMBINED N/A 6/29/2016    Procedure: COMBINED ESOPHAGOSCOPY, GASTROSCOPY, DUODENOSCOPY (EGD), DILATATION;  Surgeon: Jens Wise MD;  [facility]     EXPLORE NECK N/A 5/19/2017    Procedure: EXPLORE NECK;;  Surgeon: Nailni Barreto MD;  [facility]     HC UGI ENDOSCOPY W TRANSENDOSCOPIC STENT PLACEMENT N/A 7/12/2016    Procedure: COMBINED ESOPHAGOSCOPY, GASTROSCOPY, DUODENOSCOPY (EGD), PLACE TRANSENDOSCOPIC ESOPHAGEAL STENT;  Surgeon: Jens Wise MD;  [facility]      UGI ENDOSCOPY W TRANSENDOSCOPIC  STENT PLACEMENT N/A 7/22/2016    Procedure: COMBINED ESOPHAGOSCOPY, GASTROSCOPY, DUODENOSCOPY (EGD), PLACE TRANSENDOSCOPIC ESOPHAGEAL STENT;  Surgeon: Jens Wise MD;  [facility]     IRRIGATION AND DEBRIDEMENT CHEST WASHOUT, COMBINED N/A 6/29/2016    Procedure: COMBINED IRRIGATION AND DEBRIDEMENT CHEST WASHOUT;  Surgeon: Jens Wise MD;  [facility]     IRRIGATION AND DEBRIDEMENT CHEST WASHOUT, COMBINED N/A 5/23/2017    Procedure: COMBINED IRRIGATION AND DEBRIDEMENT CHEST WASHOUT;  COMBINED IRRIGATION AND DEBRIDEMENT CHEST WASHOUT,COMBINED ESOPHAGOSCOPY, GASTROSCOPY, DUODENOSCOPY (EGD) ;  Surgeon: Nalini Barreto MD;  [facility]     IRRIGATION AND DEBRIDEMENT CHEST WASHOUT, COMBINED N/A 5/24/2017    Procedure: COMBINED IRRIGATION AND DEBRIDEMENT CHEST WASHOUT;  Chest wound Washout and Dressing Change;  Surgeon: Nalini Barreto MD;  [facility]     IRRIGATION AND DEBRIDEMENT CHEST WASHOUT, COMBINED N/A 5/25/2017    Procedure: COMBINED IRRIGATION AND DEBRIDEMENT CHEST WASHOUT;  Irrigation and Debridement Chest Washout and dressing change;  Surgeon: Nalini Barreto MD;  [facility]     LAPAROSCOPIC ASSISTED INSERTION TUBE JEJUNOSTOMY N/A 4/17/2017    Procedure: LAPAROSCOPIC ASSISTED INSERTION TUBE JEJUNOSTOMY;;  Surgeon: Jens Wise MD;  [facility]     LAPAROSCOPY DIAGNOSTIC (GENERAL) N/A 1/9/2017    Procedure: LAPAROSCOPY DIAGNOSTIC (GENERAL);  Surgeon: Jens Wise MD;  [facility]     LAPAROSCOPY DIAGNOSTIC (GENERAL) N/A 4/17/2017    Procedure: LAPAROSCOPY DIAGNOSTIC (GENERAL);  Laparoscopic , Neck Dissection, Spit Fistula Takedown, Laparoscopic Jejunostomy Tube and Pharyngostomy Tube, Gastric Pull up, Upper Endoscopy(EGD)  , Flexible Bronchoscopy ,Sternotomy ;  Surgeon: Jens Wise MD;  [facility]     LUMPECTOMY BREAST Right 2007     NERVE BLOCK PERIPHERAL N/A 8/30/2016    Procedure: NERVE BLOCK PERIPHERAL;   "Surgeon: GENERIC ANESTHESIA PROVIDER;  [facility]     NISSEN FUNDOPLICATION  1/2016     PHARYNGOSTOMY N/A 4/18/2016    Procedure: PHARYNGOSTOMY;  Surgeon: Alexis Barraza MD;  [facility]     PHARYNGOSTOMY N/A 4/25/2016    Procedure: PHARYNGOSTOMY;  Surgeon: Alexis Barraza MD;  [facility]     PHARYNGOSTOMY N/A 5/4/2016    Procedure: PHARYNGOSTOMY;  Surgeon: Alexis Barraza MD;  [facility]     PHARYNGOSTOMY N/A 6/22/2016    Procedure: PHARYNGOSTOMY;  Surgeon: Alexis Barraza MD;  [facility]     PHARYNGOSTOMY N/A 6/29/2016    Procedure: PHARYNGOSTOMY;  Surgeon: Jens Wise MD;  [facility]     PHARYNGOSTOMY N/A 4/21/2017    Procedure: PHARYNGOSTOMY;;  Surgeon: Jens Wise MD;  [facility]     PICC INSERTION Left 8/25/2016    5fr DL BioFlo PICC, 42cm (3cm external) in the L medial brachial vein w/ tip in the SVC RA junction.     THORACOTOMY Right 1998    lung infection - \"hard crust formed on lung\"     THORACOTOMY Left 1/9/2017    Procedure: THORACOTOMY;  Surgeon: Jens Wise MD;  [facility]     WRIST SURGERY           Current Facility-Administered Medications on File Prior to Encounter:  bupivacaine 0.25 % - EPINEPHrine 1:200,000 injection     Current Outpatient Prescriptions on File Prior to Encounter:  oxyCODONE (ROXICODONE) 10 MG IR tablet Take 1 to 1.5 tablet every 3-4 hours PRN pain   diphenoxylate-atropine (LOMOTIL) 0.5-0.005 mg/mL liquid Take 5 mLs by mouth 4 times daily as needed for diarrhea   fiber modular (NUTRISOURCE FIBER) packet 1 packet by Per Feeding Tube route 3 times daily (with meals)   metoprolol (LOPRESSOR) 10 mg/mL SUSP 2.5 mLs (25 mg) by Per J Tube route 2 times daily   opium tincture 10 mg/mL (1%) liquid Take 0.6 mLs (6 mg) by mouth every 6 hours   ferrous sulfate 300 (60 FE) MG/5ML syrup 5 mLs (300 mg) by Per J Tube route daily   gabapentin (NEURONTIN) 250 MG/5ML solution " Take 6 mLs (300 mg) by mouth every 8 hours   traZODone (DESYREL) 100 MG tablet 0.5 tablets (50 mg) by Per G Tube route At Bedtime (Patient taking differently: 100 mg by Per G Tube route At Bedtime )   ranitidine (ZANTAC) 150 MG/10ML syrup Take 10 mLs (150 mg) by mouth 2 times daily   warfarin (COUMADIN) 2.5 MG tablet Take 1 tablet (2.5 mg) by mouth daily (Patient not taking: Reported on 5/18/2017)   [place] DME [place] [facility] [place]-IntermittentSuction Canisters(2)Suction Canister Holders(2)Suction Connect Tube(2)5 in 1 Connector(2)Bacteria Filter(2)[employer]unkauer Suction(2)Treatment Diagnosis: Esophogeal Perforation, s/p esophagectomy   order for DME Equipment being ordered: Suction PumpSuction Canister(2)Suction Tubing(2)Bacteria Filter(2)[employer]auer(4)Red Rubber Catheter(2)Treatment Diagnosis: Esophogeal Perforation, s/p esophagectomy   DiphenhydrAMINE HCl (BENADRYL PO) Take 25 mg by mouth nightly as needed   cholestyramine (QUESTRAN) 4 G Packet Take 1 packet by mouth daily   multivitamins with minerals (CERTAVITE/CEROVITE) LIQD liquid Take 15 mLs by mouth daily   loperamide (IMODIUM) 1 MG/5ML liquid Take 20 mLs (4 mg) by mouth 4 times daily as needed for diarrhea (Patient taking differently: Take 4 mg by mouth 2 times daily )   Lactobacillus Rhamnosus, GG, (CULTURELLE PO) Take 1 tablet by mouth 2 times daily        Social Hx:   Social History   Substance Use Topics     Smoking status: Former Smoker     Packs/day: 1.00     Years: 15.00     Types: Cigarettes     Quit date: 1/1/1998     Smokeless tobacco: Not on file     Alcohol use No       Allergies:   Allergies   Allergen Reactions     Amoxicillin Diarrhea     Ativan [Lorazepam] Other (See Comments)     Hallucinations     Hydromorphone Itching     4/12/17 - patient open to using this as she tolerated Hydromorphone PCA during hospitalization in January 2017.      Morphine Itching         NPO Status: Per ASA Guidelines    Labs:    Blood  Bank:  Lab Results   Component Value Date    ABO A 05/27/2017    RH  Neg 05/27/2017    AS Neg 05/27/2017     BMP:  Recent Labs   Lab Test  05/30/17   0654   NA  140   POTASSIUM  3.3*   CHLORIDE  106   CO2  25   BUN  4*   CR  0.39*   GLC  97   ANNABELLE  7.8*     CBC:   Recent Labs   Lab Test  05/29/17   0133   WBC  7.7   RBC  2.77*   HGB  7.7*   HCT  25.5*   MCV  92   MCH  27.8   MCHC  30.2*   RDW  17.5*   PLT  490*     Coags:  Recent Labs   Lab Test  05/29/17   0623  05/27/17   0948   05/19/17   1650   INR  1.51*  1.43*   < >  1.87*   PTT   --   65*   --   54*   FIBR   --    --    --   497*    < > = values in this interval not displayed.         Physical Exam  Normal systems: pulmonary and dental    Airway   Mallampati: II  TM distance: >3 FB  Neck ROM: full    Dental     Cardiovascular   Rhythm and rate: regular and normal      Pulmonary                     Anesthesia Plan      History & Physical Review  History and physical reviewed and following examination; no interval change.    ASA Status:  3 .        Plan for MAC with Propofol and Intravenous induction. Maintenance will be TIVA.  Reason for MAC:  Deep or markedly invasive procedure (G8)  PONV prophylaxis:  Ondansetron (or other 5HT-3)  Additional equipment: 2nd IV   - O2 through nasal cannula  - Standard ASA monitors  - Abx per surgical team    Final anesthetic plan per staff anesthesiologist on the day of surgery.      Postoperative Care  Postoperative pain management:  IV analgesics and Multi-modal analgesia.      Consents  Anesthetic plan, risks, benefits and alternatives discussed with:  Patient.  Use of blood products discussed: Yes.   .        Cassius Thompson  05/30/17

## 2017-05-30 NOTE — PLAN OF CARE
Problem: Infection, Risk/Actual (Adult)  Goal: Identify Related Risk Factors and Signs and Symptoms  Related risk factors and signs and symptoms are identified upon initiation of Human Response Clinical Practice Guideline (CPG)  Patient AVSS. Went to OR this am for debridement. Moderate pain upon return. Oxycodone and IV dilaudid given with fair relief. Patient up ambulated in hallway with therapy and up to chair. Chest dressing dry and intact. Patient voiding adequate. Edema BLE. K replaced.

## 2017-05-30 NOTE — BRIEF OP NOTE
[facility], [place]    Brief Operative Note    Pre-operative diagnosis: Chest wound  Post-operative diagnosis Chest wound  Procedure: Irrigation and debridement of subcutaneous tissue and muscle, wound class IV  Surgeon: Surgeon(s) and Role:     * Nalini Barreto MD - Primary     * Storm Whitlock MD - Assisting  Anesthesia: General   Estimated blood loss: minimal  Drains: Prior chest tube left in place  Specimens: none  Findings:   Fibrinous exudate debrided to healthy tissue  Complications: none  Implants: none

## 2017-05-30 NOTE — OR NURSING
Pt recd via casrt jfrom 7B, pt has TPN infusing per RUE picc line @ 60ml/hr, and D51/2NS +20meq K+ @40ml/hr per picc.

## 2017-05-30 NOTE — PROGRESS NOTES
Thoracic surgery progress note    Pain controlled. Diarrhea returned no tube feeds. Tolerating TPN. Eager for clinical progress and dispo plan.    Temp: 97  F (36.1  C) Temp  Min: 95.9  F (35.5  C)  Max: 97  F (36.1  C)  Resp: 18 Resp  Min: 16  Max: 18  SpO2: 98 % SpO2  Min: 92 %  Max: 98 %    No Data Recorded  Heart Rate: 68 Heart Rate  Min: 62  Max: 72  BP: 150/61 Systolic (24hrs), Av , Min:136 , Max:155   Diastolic (24hrs), Av, Min:53, Max:67    A&O, NAD  Unlabored breathing on RA  Chest wound dressing saturated with green bilious fluid  Chest tube w/ green bilious output  Abd soft, non tender, non distended    72F w/ chronic esophageal perforation s/p Nissen at OSH, now s/p esophagectomy with gastric pullup c/b recurrent anastamotic leak and mediastinal abscess s/p serial washouts      - Continue TPN  - Restart TF today  - Strict NPO.   - Chest tube to gravity  - OR washouts under MAC every other day. Will change dressing in OR today  - C/w Zosyn/Fluc ( - )  - Possible pharyngostomy this week if dressing continues to be soiled  - Heparin SQ      Saul Rogers PA-C  P: [phone]

## 2017-05-31 ENCOUNTER — HOSPITAL ENCOUNTER (INPATIENT)
Dept: VASCULAR ULTRASOUND | Facility: CLINIC | Age: 72
DRG: 856 | End: 2017-05-31
Attending: STUDENT IN AN ORGANIZED HEALTH CARE EDUCATION/TRAINING PROGRAM | Admitting: THORACIC SURGERY (CARDIOTHORACIC VASCULAR SURGERY)
Payer: MEDICARE

## 2017-05-31 ENCOUNTER — APPOINTMENT (OUTPATIENT)
Dept: GENERAL RADIOLOGY | Facility: CLINIC | Age: 72
DRG: 856 | End: 2017-05-31
Attending: STUDENT IN AN ORGANIZED HEALTH CARE EDUCATION/TRAINING PROGRAM
Payer: MEDICARE

## 2017-05-31 ENCOUNTER — ANESTHESIA (OUTPATIENT)
Dept: SURGERY | Facility: CLINIC | Age: 72
DRG: 856 | End: 2017-05-31
Payer: MEDICARE

## 2017-05-31 ENCOUNTER — APPOINTMENT (OUTPATIENT)
Dept: OCCUPATIONAL THERAPY | Facility: CLINIC | Age: 72
DRG: 856 | End: 2017-05-31
Attending: STUDENT IN AN ORGANIZED HEALTH CARE EDUCATION/TRAINING PROGRAM
Payer: MEDICARE

## 2017-05-31 LAB
ABO + RH BLD: NORMAL
ABO + RH BLD: NORMAL
ANION GAP SERPL CALCULATED.3IONS-SCNC: 8 MMOL/L (ref 3–14)
BLD GP AB SCN SERPL QL: NORMAL
BLOOD BANK CMNT PATIENT-IMP: NORMAL
BUN SERPL-MCNC: 4 MG/DL (ref 7–30)
CALCIUM SERPL-MCNC: 8.2 MG/DL (ref 8.5–10.1)
CHLORIDE SERPL-SCNC: 109 MMOL/L (ref 94–109)
CO2 SERPL-SCNC: 25 MMOL/L (ref 20–32)
CREAT SERPL-MCNC: 0.38 MG/DL (ref 0.52–1.04)
GFR SERPL CREATININE-BSD FRML MDRD: ABNORMAL ML/MIN/1.7M2
GLUCOSE BLDC GLUCOMTR-MCNC: 185 MG/DL (ref 70–99)
GLUCOSE BLDC GLUCOMTR-MCNC: 82 MG/DL (ref 70–99)
GLUCOSE BLDC GLUCOMTR-MCNC: 98 MG/DL (ref 70–99)
GLUCOSE SERPL-MCNC: 78 MG/DL (ref 70–99)
MAGNESIUM SERPL-MCNC: 1.9 MG/DL (ref 1.6–2.3)
PHOSPHATE SERPL-MCNC: 2 MG/DL (ref 2.5–4.5)
PLATELET # BLD AUTO: 527 10E9/L (ref 150–450)
POTASSIUM SERPL-SCNC: 3.9 MMOL/L (ref 3.4–5.3)
POTASSIUM SERPL-SCNC: 4 MMOL/L (ref 3.4–5.3)
SODIUM SERPL-SCNC: 142 MMOL/L (ref 133–144)
SPECIMEN EXP DATE BLD: NORMAL

## 2017-05-31 PROCEDURE — 86901 BLOOD TYPING SEROLOGIC RH(D): CPT | Performed by: ANESTHESIOLOGY

## 2017-05-31 PROCEDURE — 36415 COLL VENOUS BLD VENIPUNCTURE: CPT | Performed by: STUDENT IN AN ORGANIZED HEALTH CARE EDUCATION/TRAINING PROGRAM

## 2017-05-31 PROCEDURE — 25000128 H RX IP 250 OP 636: Performed by: STUDENT IN AN ORGANIZED HEALTH CARE EDUCATION/TRAINING PROGRAM

## 2017-05-31 PROCEDURE — 25000132 ZZH RX MED GY IP 250 OP 250 PS 637: Mod: GY | Performed by: STUDENT IN AN ORGANIZED HEALTH CARE EDUCATION/TRAINING PROGRAM

## 2017-05-31 PROCEDURE — 86900 BLOOD TYPING SEROLOGIC ABO: CPT | Performed by: ANESTHESIOLOGY

## 2017-05-31 PROCEDURE — 12000003 ZZH R&B CRITICAL UMMC

## 2017-05-31 PROCEDURE — 40000133 ZZH STATISTIC OT WARD VISIT: Performed by: OCCUPATIONAL THERAPIST

## 2017-05-31 PROCEDURE — 36000059 ZZH SURGERY LEVEL 3 EA 15 ADDTL MIN UMMC: Performed by: STUDENT IN AN ORGANIZED HEALTH CARE EDUCATION/TRAINING PROGRAM

## 2017-05-31 PROCEDURE — 25000125 ZZHC RX 250: Performed by: STUDENT IN AN ORGANIZED HEALTH CARE EDUCATION/TRAINING PROGRAM

## 2017-05-31 PROCEDURE — 86850 RBC ANTIBODY SCREEN: CPT | Performed by: ANESTHESIOLOGY

## 2017-05-31 PROCEDURE — C9399 UNCLASSIFIED DRUGS OR BIOLOG: HCPCS | Performed by: NURSE ANESTHETIST, CERTIFIED REGISTERED

## 2017-05-31 PROCEDURE — 00000146 ZZHCL STATISTIC GLUCOSE BY METER IP

## 2017-05-31 PROCEDURE — 36000061 ZZH SURGERY LEVEL 3 W FLUORO 1ST 30 MIN - UMMC: Performed by: STUDENT IN AN ORGANIZED HEALTH CARE EDUCATION/TRAINING PROGRAM

## 2017-05-31 PROCEDURE — 71000015 ZZH RECOVERY PHASE 1 LEVEL 2 EA ADDTL HR: Performed by: STUDENT IN AN ORGANIZED HEALTH CARE EDUCATION/TRAINING PROGRAM

## 2017-05-31 PROCEDURE — 25000128 H RX IP 250 OP 636: Performed by: ANESTHESIOLOGY

## 2017-05-31 PROCEDURE — 84100 ASSAY OF PHOSPHORUS: CPT | Performed by: STUDENT IN AN ORGANIZED HEALTH CARE EDUCATION/TRAINING PROGRAM

## 2017-05-31 PROCEDURE — 25000566 ZZH SEVOFLURANE, EA 15 MIN: Performed by: STUDENT IN AN ORGANIZED HEALTH CARE EDUCATION/TRAINING PROGRAM

## 2017-05-31 PROCEDURE — 27210429 ZZH NUTRITION PRODUCT INTERMEDIATE LITER

## 2017-05-31 PROCEDURE — 85049 AUTOMATED PLATELET COUNT: CPT | Performed by: THORACIC SURGERY (CARDIOTHORACIC VASCULAR SURGERY)

## 2017-05-31 PROCEDURE — 40000802 ZZH SITE CHECK

## 2017-05-31 PROCEDURE — 80048 BASIC METABOLIC PNL TOTAL CA: CPT | Performed by: STUDENT IN AN ORGANIZED HEALTH CARE EDUCATION/TRAINING PROGRAM

## 2017-05-31 PROCEDURE — 0JD60ZZ EXTRACTION OF CHEST SUBCUTANEOUS TISSUE AND FASCIA, OPEN APPROACH: ICD-10-PCS | Performed by: STUDENT IN AN ORGANIZED HEALTH CARE EDUCATION/TRAINING PROGRAM

## 2017-05-31 PROCEDURE — 25000125 ZZHC RX 250

## 2017-05-31 PROCEDURE — 40000277 XR SURGERY CARM FLUORO LESS THAN 5 MIN W STILLS: Mod: TC

## 2017-05-31 PROCEDURE — 40000170 ZZH STATISTIC PRE-PROCEDURE ASSESSMENT II: Performed by: STUDENT IN AN ORGANIZED HEALTH CARE EDUCATION/TRAINING PROGRAM

## 2017-05-31 PROCEDURE — 25000132 ZZH RX MED GY IP 250 OP 250 PS 637: Mod: GY | Performed by: SURGERY

## 2017-05-31 PROCEDURE — 37000008 ZZH ANESTHESIA TECHNICAL FEE, 1ST 30 MIN: Performed by: STUDENT IN AN ORGANIZED HEALTH CARE EDUCATION/TRAINING PROGRAM

## 2017-05-31 PROCEDURE — 25000128 H RX IP 250 OP 636: Performed by: THORACIC SURGERY (CARDIOTHORACIC VASCULAR SURGERY)

## 2017-05-31 PROCEDURE — 25800025 ZZH RX 258: Performed by: STUDENT IN AN ORGANIZED HEALTH CARE EDUCATION/TRAINING PROGRAM

## 2017-05-31 PROCEDURE — A9270 NON-COVERED ITEM OR SERVICE: HCPCS | Mod: GY | Performed by: STUDENT IN AN ORGANIZED HEALTH CARE EDUCATION/TRAINING PROGRAM

## 2017-05-31 PROCEDURE — 97535 SELF CARE MNGMENT TRAINING: CPT | Mod: GO | Performed by: OCCUPATIONAL THERAPIST

## 2017-05-31 PROCEDURE — C1769 GUIDE WIRE: HCPCS | Performed by: STUDENT IN AN ORGANIZED HEALTH CARE EDUCATION/TRAINING PROGRAM

## 2017-05-31 PROCEDURE — 25000125 ZZHC RX 250: Performed by: NURSE ANESTHETIST, CERTIFIED REGISTERED

## 2017-05-31 PROCEDURE — 37000009 ZZH ANESTHESIA TECHNICAL FEE, EACH ADDTL 15 MIN: Performed by: STUDENT IN AN ORGANIZED HEALTH CARE EDUCATION/TRAINING PROGRAM

## 2017-05-31 PROCEDURE — A9270 NON-COVERED ITEM OR SERVICE: HCPCS | Mod: GY | Performed by: SURGERY

## 2017-05-31 PROCEDURE — 25000125 ZZHC RX 250: Performed by: ANESTHESIOLOGY

## 2017-05-31 PROCEDURE — 83735 ASSAY OF MAGNESIUM: CPT | Performed by: STUDENT IN AN ORGANIZED HEALTH CARE EDUCATION/TRAINING PROGRAM

## 2017-05-31 PROCEDURE — 71000014 ZZH RECOVERY PHASE 1 LEVEL 2 FIRST HR: Performed by: STUDENT IN AN ORGANIZED HEALTH CARE EDUCATION/TRAINING PROGRAM

## 2017-05-31 PROCEDURE — 25000128 H RX IP 250 OP 636: Performed by: NURSE ANESTHETIST, CERTIFIED REGISTERED

## 2017-05-31 RX ORDER — DEXAMETHASONE SODIUM PHOSPHATE 4 MG/ML
INJECTION, SOLUTION INTRA-ARTICULAR; INTRALESIONAL; INTRAMUSCULAR; INTRAVENOUS; SOFT TISSUE PRN
Status: DISCONTINUED | OUTPATIENT
Start: 2017-05-31 | End: 2017-05-31

## 2017-05-31 RX ORDER — LIDOCAINE HYDROCHLORIDE 20 MG/ML
INJECTION, SOLUTION INFILTRATION; PERINEURAL PRN
Status: DISCONTINUED | OUTPATIENT
Start: 2017-05-31 | End: 2017-05-31

## 2017-05-31 RX ORDER — FENTANYL CITRATE 50 UG/ML
INJECTION, SOLUTION INTRAMUSCULAR; INTRAVENOUS PRN
Status: DISCONTINUED | OUTPATIENT
Start: 2017-05-31 | End: 2017-05-31

## 2017-05-31 RX ORDER — SODIUM CHLORIDE, SODIUM LACTATE, POTASSIUM CHLORIDE, CALCIUM CHLORIDE 600; 310; 30; 20 MG/100ML; MG/100ML; MG/100ML; MG/100ML
INJECTION, SOLUTION INTRAVENOUS CONTINUOUS
Status: DISCONTINUED | OUTPATIENT
Start: 2017-05-31 | End: 2017-05-31 | Stop reason: HOSPADM

## 2017-05-31 RX ORDER — ONDANSETRON 2 MG/ML
4 INJECTION INTRAMUSCULAR; INTRAVENOUS EVERY 30 MIN PRN
Status: DISCONTINUED | OUTPATIENT
Start: 2017-05-31 | End: 2017-05-31 | Stop reason: HOSPADM

## 2017-05-31 RX ORDER — PROPOFOL 10 MG/ML
INJECTION, EMULSION INTRAVENOUS PRN
Status: DISCONTINUED | OUTPATIENT
Start: 2017-05-31 | End: 2017-05-31

## 2017-05-31 RX ORDER — CEFAZOLIN SODIUM 2 G/100ML
2 INJECTION, SOLUTION INTRAVENOUS
Status: COMPLETED | OUTPATIENT
Start: 2017-05-31 | End: 2017-05-31

## 2017-05-31 RX ORDER — CEFAZOLIN SODIUM 1 G/3ML
1 INJECTION, POWDER, FOR SOLUTION INTRAMUSCULAR; INTRAVENOUS SEE ADMIN INSTRUCTIONS
Status: DISCONTINUED | OUTPATIENT
Start: 2017-05-31 | End: 2017-05-31 | Stop reason: HOSPADM

## 2017-05-31 RX ORDER — EPHEDRINE SULFATE 50 MG/ML
INJECTION, SOLUTION INTRAMUSCULAR; INTRAVENOUS; SUBCUTANEOUS PRN
Status: DISCONTINUED | OUTPATIENT
Start: 2017-05-31 | End: 2017-05-31

## 2017-05-31 RX ORDER — HYDROMORPHONE HYDROCHLORIDE 1 MG/ML
.3-.5 INJECTION, SOLUTION INTRAMUSCULAR; INTRAVENOUS; SUBCUTANEOUS EVERY 5 MIN PRN
Status: DISCONTINUED | OUTPATIENT
Start: 2017-05-31 | End: 2017-05-31 | Stop reason: HOSPADM

## 2017-05-31 RX ORDER — ONDANSETRON 4 MG/1
4 TABLET, ORALLY DISINTEGRATING ORAL EVERY 30 MIN PRN
Status: DISCONTINUED | OUTPATIENT
Start: 2017-05-31 | End: 2017-05-31 | Stop reason: HOSPADM

## 2017-05-31 RX ORDER — ESMOLOL HYDROCHLORIDE 10 MG/ML
INJECTION INTRAVENOUS PRN
Status: DISCONTINUED | OUTPATIENT
Start: 2017-05-31 | End: 2017-05-31

## 2017-05-31 RX ORDER — LABETALOL HYDROCHLORIDE 5 MG/ML
10 INJECTION, SOLUTION INTRAVENOUS
Status: DISCONTINUED | OUTPATIENT
Start: 2017-05-31 | End: 2017-05-31 | Stop reason: HOSPADM

## 2017-05-31 RX ORDER — FENTANYL CITRATE 50 UG/ML
25-50 INJECTION, SOLUTION INTRAMUSCULAR; INTRAVENOUS
Status: DISCONTINUED | OUTPATIENT
Start: 2017-05-31 | End: 2017-05-31 | Stop reason: HOSPADM

## 2017-05-31 RX ORDER — SODIUM CHLORIDE, SODIUM LACTATE, POTASSIUM CHLORIDE, CALCIUM CHLORIDE 600; 310; 30; 20 MG/100ML; MG/100ML; MG/100ML; MG/100ML
INJECTION, SOLUTION INTRAVENOUS CONTINUOUS PRN
Status: DISCONTINUED | OUTPATIENT
Start: 2017-05-31 | End: 2017-05-31

## 2017-05-31 RX ORDER — LIDOCAINE 40 MG/G
CREAM TOPICAL
Status: DISCONTINUED | OUTPATIENT
Start: 2017-05-31 | End: 2017-06-13

## 2017-05-31 RX ORDER — ONDANSETRON 2 MG/ML
INJECTION INTRAMUSCULAR; INTRAVENOUS PRN
Status: DISCONTINUED | OUTPATIENT
Start: 2017-05-31 | End: 2017-05-31

## 2017-05-31 RX ORDER — HEPARIN SODIUM,PORCINE 10 UNIT/ML
2-5 VIAL (ML) INTRAVENOUS
Status: COMPLETED | OUTPATIENT
Start: 2017-05-31 | End: 2017-06-16

## 2017-05-31 RX ADMIN — Medication 6 MG: at 21:49

## 2017-05-31 RX ADMIN — FENTANYL CITRATE 25 MCG: 50 INJECTION, SOLUTION INTRAMUSCULAR; INTRAVENOUS at 15:00

## 2017-05-31 RX ADMIN — Medication 6 MG: at 04:06

## 2017-05-31 RX ADMIN — METOPROLOL TARTRATE 25 MG: 100 TABLET ORAL at 08:11

## 2017-05-31 RX ADMIN — LOPERAMIDE HYDROCHLORIDE 4 MG: 1 SOLUTION ORAL at 19:52

## 2017-05-31 RX ADMIN — Medication 6 MG: at 10:04

## 2017-05-31 RX ADMIN — OXYCODONE HYDROCHLORIDE 15 MG: 5 SOLUTION ORAL at 17:55

## 2017-05-31 RX ADMIN — LOPERAMIDE HYDROCHLORIDE 4 MG: 1 SOLUTION ORAL at 10:35

## 2017-05-31 RX ADMIN — HYDROMORPHONE HYDROCHLORIDE 0.2 MG: 1 INJECTION, SOLUTION INTRAMUSCULAR; INTRAVENOUS; SUBCUTANEOUS at 15:39

## 2017-05-31 RX ADMIN — HEPARIN SODIUM 5000 UNITS: 5000 INJECTION, SOLUTION INTRAVENOUS; SUBCUTANEOUS at 05:06

## 2017-05-31 RX ADMIN — Medication 5 MG: at 13:00

## 2017-05-31 RX ADMIN — LIDOCAINE 1 PATCH: 50 PATCH TOPICAL at 09:52

## 2017-05-31 RX ADMIN — PIPERACILLIN SODIUM,TAZOBACTAM SODIUM 3.38 G: 3; .375 INJECTION, POWDER, FOR SOLUTION INTRAVENOUS at 05:06

## 2017-05-31 RX ADMIN — OXYCODONE HYDROCHLORIDE 15 MG: 5 SOLUTION ORAL at 21:49

## 2017-05-31 RX ADMIN — FLUCONAZOLE 200 MG: 2 INJECTION INTRAVENOUS at 19:51

## 2017-05-31 RX ADMIN — Medication 1 PACKET: at 10:36

## 2017-05-31 RX ADMIN — Medication 30 MG: at 12:28

## 2017-05-31 RX ADMIN — OXYCODONE HYDROCHLORIDE 15 MG: 5 SOLUTION ORAL at 00:36

## 2017-05-31 RX ADMIN — HYDROMORPHONE HYDROCHLORIDE 0.5 MG: 1 INJECTION, SOLUTION INTRAMUSCULAR; INTRAVENOUS; SUBCUTANEOUS at 08:31

## 2017-05-31 RX ADMIN — LIDOCAINE HYDROCHLORIDE 2 ML: 10 INJECTION, SOLUTION INFILTRATION; PERINEURAL at 18:50

## 2017-05-31 RX ADMIN — ACETAMINOPHEN 975 MG: 160 SUSPENSION ORAL at 08:11

## 2017-05-31 RX ADMIN — POTASSIUM CHLORIDE: 2 INJECTION, SOLUTION, CONCENTRATE INTRAVENOUS at 19:54

## 2017-05-31 RX ADMIN — ESMOLOL HYDROCHLORIDE 10 MG: 10 INJECTION, SOLUTION INTRAVENOUS at 12:47

## 2017-05-31 RX ADMIN — OXYCODONE HYDROCHLORIDE 15 MG: 5 SOLUTION ORAL at 10:04

## 2017-05-31 RX ADMIN — FENTANYL CITRATE 25 MCG: 50 INJECTION, SOLUTION INTRAMUSCULAR; INTRAVENOUS at 14:50

## 2017-05-31 RX ADMIN — ROCURONIUM BROMIDE 10 MG: 10 INJECTION INTRAVENOUS at 12:44

## 2017-05-31 RX ADMIN — HYDROMORPHONE HYDROCHLORIDE 0.2 MG: 1 INJECTION, SOLUTION INTRAMUSCULAR; INTRAVENOUS; SUBCUTANEOUS at 15:24

## 2017-05-31 RX ADMIN — GABAPENTIN 300 MG: 250 SOLUTION ORAL at 19:52

## 2017-05-31 RX ADMIN — PIPERACILLIN SODIUM,TAZOBACTAM SODIUM 3.38 G: 3; .375 INJECTION, POWDER, FOR SOLUTION INTRAVENOUS at 10:55

## 2017-05-31 RX ADMIN — Medication 10 ML: at 19:52

## 2017-05-31 RX ADMIN — MINERAL SUPPLEMENT IRON 300 MG / 5 ML STRENGTH LIQUID 100 PER BOX UNFLAVORED 300 MG: at 08:11

## 2017-05-31 RX ADMIN — Medication 10 ML: at 08:11

## 2017-05-31 RX ADMIN — POTASSIUM CHLORIDE, DEXTROSE MONOHYDRATE AND SODIUM CHLORIDE: 150; 5; 450 INJECTION, SOLUTION INTRAVENOUS at 15:43

## 2017-05-31 RX ADMIN — PIPERACILLIN SODIUM,TAZOBACTAM SODIUM 3.38 G: 3; .375 INJECTION, POWDER, FOR SOLUTION INTRAVENOUS at 17:32

## 2017-05-31 RX ADMIN — ONDANSETRON 4 MG: 2 INJECTION INTRAMUSCULAR; INTRAVENOUS at 14:06

## 2017-05-31 RX ADMIN — HYDROMORPHONE HYDROCHLORIDE 0.2 MG: 1 INJECTION, SOLUTION INTRAMUSCULAR; INTRAVENOUS; SUBCUTANEOUS at 16:21

## 2017-05-31 RX ADMIN — PROPOFOL 50 MG: 10 INJECTION, EMULSION INTRAVENOUS at 12:12

## 2017-05-31 RX ADMIN — METOPROLOL TARTRATE 25 MG: 100 TABLET ORAL at 19:51

## 2017-05-31 RX ADMIN — GABAPENTIN 300 MG: 250 SOLUTION ORAL at 04:06

## 2017-05-31 RX ADMIN — ACETAMINOPHEN 975 MG: 160 SUSPENSION ORAL at 00:36

## 2017-05-31 RX ADMIN — DEXAMETHASONE SODIUM PHOSPHATE 6 MG: 4 INJECTION, SOLUTION INTRA-ARTICULAR; INTRALESIONAL; INTRAMUSCULAR; INTRAVENOUS; SOFT TISSUE at 13:34

## 2017-05-31 RX ADMIN — TRAZODONE HYDROCHLORIDE 100 MG: 100 TABLET ORAL at 21:49

## 2017-05-31 RX ADMIN — ROCURONIUM BROMIDE 20 MG: 10 INJECTION INTRAVENOUS at 12:12

## 2017-05-31 RX ADMIN — PANTOPRAZOLE SODIUM 40 MG: 40 TABLET, DELAYED RELEASE ORAL at 08:12

## 2017-05-31 RX ADMIN — HYDROMORPHONE HYDROCHLORIDE 0.2 MG: 1 INJECTION, SOLUTION INTRAMUSCULAR; INTRAVENOUS; SUBCUTANEOUS at 15:44

## 2017-05-31 RX ADMIN — I.V. FAT EMULSION 250 ML: 20 EMULSION INTRAVENOUS at 19:55

## 2017-05-31 RX ADMIN — Medication 5 MG: at 13:58

## 2017-05-31 RX ADMIN — OXYCODONE HYDROCHLORIDE 15 MG: 5 SOLUTION ORAL at 07:05

## 2017-05-31 RX ADMIN — CEFAZOLIN SODIUM 2 G: 2 INJECTION, SOLUTION INTRAVENOUS at 12:30

## 2017-05-31 RX ADMIN — Medication 1 PACKET: at 20:08

## 2017-05-31 RX ADMIN — SUGAMMADEX 100 MG: 100 INJECTION, SOLUTION INTRAVENOUS at 14:05

## 2017-05-31 RX ADMIN — FENTANYL CITRATE 100 MCG: 50 INJECTION, SOLUTION INTRAMUSCULAR; INTRAVENOUS at 12:12

## 2017-05-31 RX ADMIN — SODIUM CHLORIDE, POTASSIUM CHLORIDE, SODIUM LACTATE AND CALCIUM CHLORIDE: 600; 310; 30; 20 INJECTION, SOLUTION INTRAVENOUS at 12:00

## 2017-05-31 RX ADMIN — FENTANYL CITRATE 50 MCG: 50 INJECTION, SOLUTION INTRAMUSCULAR; INTRAVENOUS at 13:45

## 2017-05-31 RX ADMIN — MIDAZOLAM HYDROCHLORIDE 1 MG: 1 INJECTION, SOLUTION INTRAMUSCULAR; INTRAVENOUS at 12:00

## 2017-05-31 RX ADMIN — LIDOCAINE HYDROCHLORIDE 60 MG: 20 INJECTION, SOLUTION INFILTRATION; PERINEURAL at 12:12

## 2017-05-31 RX ADMIN — OXYCODONE HYDROCHLORIDE 15 MG: 5 SOLUTION ORAL at 04:06

## 2017-05-31 RX ADMIN — HYDROMORPHONE HYDROCHLORIDE 0.3 MG: 1 INJECTION, SOLUTION INTRAMUSCULAR; INTRAVENOUS; SUBCUTANEOUS at 14:57

## 2017-05-31 RX ADMIN — SIMETHICONE 40 MG: 20 SUSPENSION/ DROPS ORAL at 10:51

## 2017-05-31 RX ADMIN — HEPARIN SODIUM 5000 UNITS: 5000 INJECTION, SOLUTION INTRAVENOUS; SUBCUTANEOUS at 19:51

## 2017-05-31 ASSESSMENT — PAIN DESCRIPTION - DESCRIPTORS
DESCRIPTORS: ACHING;SORE

## 2017-05-31 NOTE — BRIEF OP NOTE
[facility], [place]    Brief Operative Note    Pre-operative diagnosis: Non healing wound   Post-operative diagnosis * No post-op diagnosis entered *  Procedure: Procedure(s):  Irrigation and Debridement Chest Washout  - Wound Class: IV-Dirty or Infected   - Wound Class: II-Clean Contaminated  Surgeon: Surgeon(s) and Role:     * Nalini Barreto MD - Primary     * Kd Liu MD - Resident - Assisting     * Saul Rogers PA-C - Assisting  Anesthesia: General   Estimated blood loss: Minimal  Drains: pharyngostomy  Specimens: * No specimens in log *  Findings:   Esophageal perforation and abscess cavity, wound debrided and dressing changed. Pharyngostomy tube placed  Complications: None.  Implants: None.

## 2017-05-31 NOTE — PLAN OF CARE
Problem: Goal Outcome Summary  Goal: Goal Outcome Summary  Vital signs:  Temp: 95.6  F (35.3  C) Temp src: Oral BP: 119/56   Heart Rate: 59 Resp: 16 SpO2: 98 % O2 Device: None (Room air)   VSS. Chest covered with dressing, some drainage. Pt C/O chest discomfort, scheduled oxycodone for pain. Chest tube with scant drainage. J-tube clamped overnight between medication administration. Fluids infusing via PIV. TPN and lipids not infused D/T inability to use PICC. Voiding using bedside commode. Large incontinent BM x 1. Sleeping between cares.

## 2017-05-31 NOTE — ANESTHESIA CARE TRANSFER NOTE
Patient: Minerva Blanco    Procedure(s):  Irrigation and Debridement Chest Washout  - Wound Class: IV-Dirty or Infected   - Wound Class: II-Clean Contaminated    Diagnosis: Non healing wound   Diagnosis Additional Information: No value filed.    Anesthesia Type:   General     Note:  Airway :Face Mask  Patient transferred to:PACU  Comments: Anesthesia Care Transfer Note      Transfer to:  PACU    Patient Vital Signs:  Stable    Airway:  None    Patient transported to PACU with supplemental O2.  Patient alert and breathing comfortably.  VSS.  Care transferred with report to PACU RN.    Gianni Montgomery CRNA      Vitals: (Last set prior to Anesthesia Care Transfer)    CRNA VITALS  5/31/2017 1345 - 5/31/2017 1421      5/31/2017             Pulse: 86    SpO2: 99 %    Resp Rate (observed): 13                Electronically Signed By: CLIVE Liang CRNA  May 31, 2017  2:21 PM

## 2017-05-31 NOTE — PROGRESS NOTES
Thoracic surgery progress note    PICC draining overnight, so TPN stopped. Pain controlled. Diarrhea improved.     Temp: 95.6  F (35.3  C) Temp  Min: 95.6  F (35.3  C)  Max: 97  F (36.1  C)  Resp: 16 Resp  Min: 16  Max: 16  SpO2: 98 % SpO2  Min: 95 %  Max: 99 %    No Data Recorded  Heart Rate: 59 Heart Rate  Min: 59  Max: 71  BP: 119/56 Systolic (24hrs), Av , Min:115 , Max:148   Diastolic (24hrs), Av, Min:51, Max:68    A&O, NAD  Unlabored breathing on RA  Dressing c/d/i  Chest tube w/ bilious drainage   Abd soft    I&O  Chest tube 195    72F w/ chronic esophageal perforation s/p Nissen at OSH, now s/p esophagectomy with gastric pullup c/b recurrent anastamotic leak and mediastinal abscess s/p serial washouts      - New PICC today; continue TPN  - Strict NPO. Advance tube feeds   - Chest tube to gravity  - Pharyngostomy tube today  - C/w Zosyn/Fluc ( - )  - Heparin SQ    Seen w/ thoracic team,    Kd Liu MD  Surgery PGY1  [phone]

## 2017-05-31 NOTE — PLAN OF CARE
"Problem: Goal Outcome Summary  Goal: Goal Outcome Summary  Outcome: No Change  /54 (BP Location: Right arm)  Pulse 72  Temp 95.8  F (35.4  C) (Axillary)  Resp 16  Ht 1.524 m (5')  Wt 47.6 kg (105 lb)  SpO2 96%  Breastfeeding? No  BMI 20.51 kg/m2  [id number]:  Afebrile. VSS on room air. Scheduled Metoprolol given. Sternum discomfort from daily I&D. Oxycodone given as scheduled. ABDs Clean, Dry, & Intact. PICC line painful. Unable to infuse TPN/Lipids as scheduled in PICC. R upper arm ultrasound completed: Results as quoted: \"No evidence of deep venous thrombosis in the right upper extremity. Previously seen small nonocclusive thrombus in the rightinternal jugular vein, likely corresponds to a normal valve.\" R PIV positional; MIVF infusing at 75 ml/hr. Fluconazole and Zosyn infused in PIV. J tube with continuous TF at 15 ml/hr. Anti-diarrheal medication administered. Up to commode with SBA. K 3.3 today and was replaced with 60 mEq on dayshift. Recheck of K ordered. Continue to monitor and follow POC.       "

## 2017-05-31 NOTE — PLAN OF CARE
Problem: Infection, Risk/Actual (Adult)  Goal: Identify Related Risk Factors and Signs and Symptoms  Related risk factors and signs and symptoms are identified upon initiation of Human Response Clinical Practice Guideline (CPG)   Right Picc leaking at site. Vascular access changed dressing but still leaking. IVF's and TPN stopped. MD notified. PIV to be placed by vascular access for IVF's. Md updated.

## 2017-05-31 NOTE — PROGRESS NOTES
CLINICAL NUTRITION SERVICES - REASSESSMENT NOTE     Nutrition Prescription    RECOMMENDATIONS FOR MDs/PROVIDERS TO ORDER:  1. Patient is at refeeding risk given ongoing nutrition support intolerances and interruptions since admit. Monitor K+/Mg++/Phos daily and replace as appropriate     2. Consider switch to non-dextrose containing IVF as pt at refeeding risk and TPN contains dextrose. Adjust IV per team discretion given pt also on TPN    3. Consider adjusting TF schedule if TF significantly on hold while pt going to OR to limit TF interruptions (lengthening cycle time?)    4. Consider GI consult to determine underlying cause of ongoing diarrhea (if not already considered/completed)      Malnutrition Status:    Severe malnutrition in the context of acute on chronic illness    Recommendations already ordered by Registered Dietitian (RD):  1. To be more conservative given refeeding risk, discussed with pharmacy holding off on dextrose advancement to goal today given lower Phos, did not receive new TPN bag last night, and provider plan to keep advancing TF   2. Adjusted free water orders to FT patency flushes (30 mL q4h) as getting fluids with TPN and IVF    Future/Additional Recommendations:  If unable to replace central line and appropriate to start temporary PPN, would recommend Clinimix (D5/AA4.25) @ 65 mL/hr (1560 mL/day) which provides 78 g dextrose, 66 g AA, and continue 250 mL 20% IV lipids 5x/week.  This would provide 886 kcal (21 kcal/kg), 1.5 g/kg PRO, GIR 1.3 mg/kg/min, and 40% kcal from fat. This meets 59% kcal needs and 100% PRO needs per dosing weight 43 kg.     EVALUATION OF THE PROGRESS TOWARD GOALS   Diet: NPO    Nutrition Support (TF): TwoCal HN @ 50 mL/hr x 16 hours (ordered to start 5/26 @ 10 mL/hr and adv by 10 mL q6h as tolerated and if K+/Mg++ >/= nrml and phos >1.9 to goal). Goal would provide 1600 kcal (37 kcal/kg), 67 g PRO (1.6 g/kg), 175 g CHO, 73 g fat, 4 g fiber, and 560 mL free water  daily    Free Water: 90 mL q2h    TF Intake: TF switched from continuous (was advancing togoal Peptamen 1.5 @ 45 mL/hr) to cycled feeds on 5/26 during the day as pt often turning TF off at night 2/2 diarrhea so she can sleep (ordered to adv to new goal TwoCal HN @ 50 mL/hr x 16 hours (formula changed to more calorically dense formula to allow for a lower goal rate)). Per review, has not been able to get TF rate >~25 mL/hr due to continued loose stools. J-tube fell out on 5/27, replaced 5/28. TF was on hold and then resumed 5/30. Per team today, they plan to continue to try to adv TF (provider adjusted orders yesterday). Per review of RN notes, pt has been adjusting TF rate. Per provider note/TF orders, TF to not run between 11 PM and 7 AM. 7-day avg TF intake = 220 kcal and 10 g PRO which meets 15% kcal and PRO needs.    Nutrition Support (TPN): TPN ordered to start on 5/28 with 1440 mL/day, 85 g dextrose, 80 g AA, and 250 mL 20% IV lipids 5x/week. Plan was to adv dextrose by 50 g/day if K+/Mg++ >/= nrml and phos >1.9 and BGs stable to goal 185 g/day. Adv to 135 g dextrose yesterday (however thsi new bag did not run 2/2 PICC leaking last night and needed to be replaced).  When at goal, TPN would provide 1306 kcal (30 kcal/kg), 1.9 g/kg PRO, GIR 3 mg/kg/min, and 27% kcal from fat per dosing weight 43 kg. Has not been able to adv dextrose 2/2 TPN interruptions.    TPN Intake: TPN started 5/28, stopped briefly on 5/29 when PICC had to be replaced and TPN resumed, TPN ran most of yesterday but stopped last night after PICC leaking before new bag was hung @ 2000. Per provider, plan to replace PICC today and resume TPN.     NEW FINDINGS   Weight: Has been stable around ~48 kg most of admit, up from admit wt of ~43 kg, likely all fluid related or scale error  Labs: Phos 2 (L), trending down since yesterday; K+ and Mg++ WNL  Meds: D5 IVF @ 75 mL/hr (on since 5/29), provides 1800 mL/day = 90 g dextrose = 306 kcal daily.  GI:  loose stools continue but per provider notes went down when TF off  Skin: going to OR every other day for washouts per provider note.    MALNUTRITION  % Intake: </= 50% for >/= 5 days (severe)  % Weight Loss: None noted, but suspect fluid related wt changes in involved  Subcutaneous Fat Loss: moderate generalized  Muscle Loss: moderate generalized  Fluid Accumulation/Edema: Mild-moderate BLE per nursing documentation  Malnutrition Diagnosis: Severe malnutrition in the context of acute on chronic illness    Previous Goals   Total avg nutritional intake to meet a minimum of 35 kcal/kg and 1.5 g PRO/kg daily (per dosing wt 43 kg).  Evaluation: Not met    Previous Nutrition Diagnosis  Inadequate protein-energy intake related to GI distress associated with TF running as evidenced by TF intermittently on hold or at lower rate since admit and  5-day avg TF intake met 18% kcal and PRO needs     Evaluation: No change, updated    CURRENT NUTRITION DIAGNOSIS  Inadequate protein-energy intake related to TF intolerances/interruptions and line access issues with TPN limiting nutrition intakes as evidenced by TF on hold x 2 days, 7-day avg TF intake =  15% kcal and PRO needs, and TPN started 3 days ago but held intermittently 2/2 line issues so dextrose advancement delays.    INTERVENTIONS  Implementation  Collaboration with other providers: provider called to discuss plan to continue TPN (at first discussed starting PPN (recieved consult), then provider called back saying will replace PICC and resume TPN). Team is advancing TF. Discussed above TPN recs with pharmacy  Feeding tube flush: modified free water orders to 30 mL q4h for FT patency    Goals  1. Total avg nutritional intake to meet a minimum of 30 kcal/kg (sole PN) vs 35 kcal/kg (EN or EN+PN) and 1.5 g PRO/kg daily (per dosing wt 43 kg).    Monitoring/Evaluation  Progress toward goals will be monitored and evaluated per protocol.     Evie Montanez, RD, LD   7B RD Pager:  [phone]

## 2017-05-31 NOTE — PLAN OF CARE
Problem: Goal Outcome Summary  Goal: Goal Outcome Summary  Outcome: No Change  Vitals:     05/31/17 1134 05/31/17 1420 05/31/17 1430 05/31/17 1440   BP: 140/64 154/77 150/74 161/73   BP Location:           Cuff Size:   Adult Regular Adult Regular     Pulse:           Resp: 16 16 14     Temp: 97.8  F (36.6  C) 94.5  F (34.7  C)       TempSrc: Oral Oral       SpO2: 99% 100% 100% 100%   Weight:           Height:             Pt's pain controlled with scheduled oxycodone 15 mg q 3 hrs and lidocaine patch on right shoulder. Chest dressing CDI. CT to gravity drainage with no output. Tolerating TF started at 15 cc/hr via J-tube. Started at 1045. TF to be running at all times, even days of surgeries. PICC line sore and leaking. Vascular to look at PICC line, and TPN to be started tonight. IV zosyn running per orders. IVF running at 75 ml/hr. Voiding adequate amounts. Using commode, having frequent loose BMs. Report given to RN on 3C. Pt left for OR at 1100. Continue with plan of care.

## 2017-05-31 NOTE — ANESTHESIA POSTPROCEDURE EVALUATION
Patient: Minerva Blanco    Procedure(s):  Irrigation and Debridement Chest Washout  - Wound Class: IV-Dirty or Infected   - Wound Class: II-Clean Contaminated    Diagnosis:Non healing wound   Diagnosis Additional Information: No value filed.    Anesthesia Type:  General    Note:  Anesthesia Post Evaluation    Patient location during evaluation: PACU  Patient participation: Able to fully participate in evaluation  Level of consciousness: awake and alert  Pain management: satisfactory to patient  Airway patency: patent  Cardiovascular status: blood pressure returned to baseline and hemodynamically stable  Respiratory status: nasal cannula  Hydration status: euvolemic  PONV: controlled     Anesthetic complications: None          Last vitals:  Vitals:    05/31/17 1520 05/31/17 1530 05/31/17 1540   BP: 138/73 144/69 (!) 143/93   Pulse:      Resp: 12 12 14   Temp:   35.6  C (96.1  F)   SpO2: 96%           Electronically Signed By: Dennys Waite MD  May 31, 2017  4:04 PM

## 2017-05-31 NOTE — PLAN OF CARE
Problem: Goal Outcome Summary  Goal: Goal Outcome Summary  OT/7B: Pt seen to review UB/LB dressing techniques with adherence to sternal precautions. Pt demonstrates good carry over from previous therapy session, able to demonstrate UB compensatory strategies with SBA. Pt awaiting transport for procedure limiting to in room activity. Pt limited by pain, precautions, decreased strength and endurance.       REC: Home with assist prn, continue HH RN/PT services.

## 2017-06-01 ENCOUNTER — ANESTHESIA (OUTPATIENT)
Dept: SURGERY | Facility: CLINIC | Age: 72
DRG: 856 | End: 2017-06-01
Payer: MEDICARE

## 2017-06-01 ENCOUNTER — ANESTHESIA EVENT (OUTPATIENT)
Dept: SURGERY | Facility: CLINIC | Age: 72
DRG: 856 | End: 2017-06-01
Payer: MEDICARE

## 2017-06-01 LAB
ANION GAP SERPL CALCULATED.3IONS-SCNC: 6 MMOL/L (ref 3–14)
BUN SERPL-MCNC: 7 MG/DL (ref 7–30)
CALCIUM SERPL-MCNC: 8.1 MG/DL (ref 8.5–10.1)
CHLORIDE SERPL-SCNC: 105 MMOL/L (ref 94–109)
CO2 SERPL-SCNC: 26 MMOL/L (ref 20–32)
CREAT SERPL-MCNC: 0.38 MG/DL (ref 0.52–1.04)
GFR SERPL CREATININE-BSD FRML MDRD: ABNORMAL ML/MIN/1.7M2
GLUCOSE BLDC GLUCOMTR-MCNC: 171 MG/DL (ref 70–99)
GLUCOSE BLDC GLUCOMTR-MCNC: 216 MG/DL (ref 70–99)
GLUCOSE SERPL-MCNC: 121 MG/DL (ref 70–99)
MAGNESIUM SERPL-MCNC: 1.8 MG/DL (ref 1.6–2.3)
PHOSPHATE SERPL-MCNC: 2.3 MG/DL (ref 2.5–4.5)
POTASSIUM SERPL-SCNC: 4.4 MMOL/L (ref 3.4–5.3)
SODIUM SERPL-SCNC: 138 MMOL/L (ref 133–144)

## 2017-06-01 PROCEDURE — A9270 NON-COVERED ITEM OR SERVICE: HCPCS | Mod: GY | Performed by: STUDENT IN AN ORGANIZED HEALTH CARE EDUCATION/TRAINING PROGRAM

## 2017-06-01 PROCEDURE — 25000128 H RX IP 250 OP 636: Performed by: STUDENT IN AN ORGANIZED HEALTH CARE EDUCATION/TRAINING PROGRAM

## 2017-06-01 PROCEDURE — 27210429 ZZH NUTRITION PRODUCT INTERMEDIATE LITER

## 2017-06-01 PROCEDURE — 25000128 H RX IP 250 OP 636: Performed by: SURGERY

## 2017-06-01 PROCEDURE — 84100 ASSAY OF PHOSPHORUS: CPT | Performed by: STUDENT IN AN ORGANIZED HEALTH CARE EDUCATION/TRAINING PROGRAM

## 2017-06-01 PROCEDURE — 36000059 ZZH SURGERY LEVEL 3 EA 15 ADDTL MIN UMMC: Performed by: STUDENT IN AN ORGANIZED HEALTH CARE EDUCATION/TRAINING PROGRAM

## 2017-06-01 PROCEDURE — A9270 NON-COVERED ITEM OR SERVICE: HCPCS | Mod: GY | Performed by: SURGERY

## 2017-06-01 PROCEDURE — 25000128 H RX IP 250 OP 636: Performed by: NURSE ANESTHETIST, CERTIFIED REGISTERED

## 2017-06-01 PROCEDURE — 25000132 ZZH RX MED GY IP 250 OP 250 PS 637: Mod: GY | Performed by: SURGERY

## 2017-06-01 PROCEDURE — 36592 COLLECT BLOOD FROM PICC: CPT | Performed by: STUDENT IN AN ORGANIZED HEALTH CARE EDUCATION/TRAINING PROGRAM

## 2017-06-01 PROCEDURE — 25000125 ZZHC RX 250

## 2017-06-01 PROCEDURE — 37000008 ZZH ANESTHESIA TECHNICAL FEE, 1ST 30 MIN: Performed by: STUDENT IN AN ORGANIZED HEALTH CARE EDUCATION/TRAINING PROGRAM

## 2017-06-01 PROCEDURE — 0JD60ZZ EXTRACTION OF CHEST SUBCUTANEOUS TISSUE AND FASCIA, OPEN APPROACH: ICD-10-PCS | Performed by: STUDENT IN AN ORGANIZED HEALTH CARE EDUCATION/TRAINING PROGRAM

## 2017-06-01 PROCEDURE — 25000128 H RX IP 250 OP 636: Performed by: THORACIC SURGERY (CARDIOTHORACIC VASCULAR SURGERY)

## 2017-06-01 PROCEDURE — 25000132 ZZH RX MED GY IP 250 OP 250 PS 637: Mod: GY | Performed by: STUDENT IN AN ORGANIZED HEALTH CARE EDUCATION/TRAINING PROGRAM

## 2017-06-01 PROCEDURE — 25000125 ZZHC RX 250: Performed by: NURSE ANESTHETIST, CERTIFIED REGISTERED

## 2017-06-01 PROCEDURE — 83735 ASSAY OF MAGNESIUM: CPT | Performed by: STUDENT IN AN ORGANIZED HEALTH CARE EDUCATION/TRAINING PROGRAM

## 2017-06-01 PROCEDURE — 37000009 ZZH ANESTHESIA TECHNICAL FEE, EACH ADDTL 15 MIN: Performed by: STUDENT IN AN ORGANIZED HEALTH CARE EDUCATION/TRAINING PROGRAM

## 2017-06-01 PROCEDURE — 12000003 ZZH R&B CRITICAL UMMC

## 2017-06-01 PROCEDURE — 25000125 ZZHC RX 250: Performed by: SURGERY

## 2017-06-01 PROCEDURE — 80048 BASIC METABOLIC PNL TOTAL CA: CPT | Performed by: STUDENT IN AN ORGANIZED HEALTH CARE EDUCATION/TRAINING PROGRAM

## 2017-06-01 PROCEDURE — 40000171 ZZH STATISTIC PRE-PROCEDURE ASSESSMENT III: Performed by: STUDENT IN AN ORGANIZED HEALTH CARE EDUCATION/TRAINING PROGRAM

## 2017-06-01 PROCEDURE — 36000057 ZZH SURGERY LEVEL 3 1ST 30 MIN - UMMC: Performed by: STUDENT IN AN ORGANIZED HEALTH CARE EDUCATION/TRAINING PROGRAM

## 2017-06-01 PROCEDURE — 00000146 ZZHCL STATISTIC GLUCOSE BY METER IP

## 2017-06-01 RX ORDER — SODIUM CHLORIDE, SODIUM LACTATE, POTASSIUM CHLORIDE, CALCIUM CHLORIDE 600; 310; 30; 20 MG/100ML; MG/100ML; MG/100ML; MG/100ML
INJECTION, SOLUTION INTRAVENOUS CONTINUOUS
Status: DISCONTINUED | OUTPATIENT
Start: 2017-06-01 | End: 2017-06-01 | Stop reason: HOSPADM

## 2017-06-01 RX ORDER — SODIUM CHLORIDE, SODIUM LACTATE, POTASSIUM CHLORIDE, CALCIUM CHLORIDE 600; 310; 30; 20 MG/100ML; MG/100ML; MG/100ML; MG/100ML
INJECTION, SOLUTION INTRAVENOUS CONTINUOUS PRN
Status: DISCONTINUED | OUTPATIENT
Start: 2017-06-01 | End: 2017-06-01

## 2017-06-01 RX ORDER — PROPOFOL 10 MG/ML
INJECTION, EMULSION INTRAVENOUS CONTINUOUS PRN
Status: DISCONTINUED | OUTPATIENT
Start: 2017-06-01 | End: 2017-06-01

## 2017-06-01 RX ORDER — ONDANSETRON 2 MG/ML
INJECTION INTRAMUSCULAR; INTRAVENOUS PRN
Status: DISCONTINUED | OUTPATIENT
Start: 2017-06-01 | End: 2017-06-01

## 2017-06-01 RX ORDER — FENTANYL CITRATE 50 UG/ML
INJECTION, SOLUTION INTRAMUSCULAR; INTRAVENOUS PRN
Status: DISCONTINUED | OUTPATIENT
Start: 2017-06-01 | End: 2017-06-01

## 2017-06-01 RX ADMIN — ONDANSETRON 4 MG: 2 INJECTION INTRAMUSCULAR; INTRAVENOUS at 08:14

## 2017-06-01 RX ADMIN — ACETAMINOPHEN 975 MG: 160 SUSPENSION ORAL at 16:10

## 2017-06-01 RX ADMIN — OXYCODONE HYDROCHLORIDE 15 MG: 5 SOLUTION ORAL at 04:08

## 2017-06-01 RX ADMIN — GABAPENTIN 300 MG: 250 SOLUTION ORAL at 15:00

## 2017-06-01 RX ADMIN — PIPERACILLIN SODIUM,TAZOBACTAM SODIUM 3.38 G: 3; .375 INJECTION, POWDER, FOR SOLUTION INTRAVENOUS at 18:09

## 2017-06-01 RX ADMIN — LOPERAMIDE HYDROCHLORIDE 4 MG: 1 SOLUTION ORAL at 19:20

## 2017-06-01 RX ADMIN — Medication 10 ML: at 12:01

## 2017-06-01 RX ADMIN — PIPERACILLIN SODIUM,TAZOBACTAM SODIUM 3.38 G: 3; .375 INJECTION, POWDER, FOR SOLUTION INTRAVENOUS at 13:36

## 2017-06-01 RX ADMIN — Medication 10 ML: at 19:21

## 2017-06-01 RX ADMIN — PIPERACILLIN SODIUM,TAZOBACTAM SODIUM 3.38 G: 3; .375 INJECTION, POWDER, FOR SOLUTION INTRAVENOUS at 05:30

## 2017-06-01 RX ADMIN — OXYCODONE HYDROCHLORIDE 15 MG: 5 SOLUTION ORAL at 19:21

## 2017-06-01 RX ADMIN — OXYCODONE HYDROCHLORIDE 15 MG: 5 SOLUTION ORAL at 15:06

## 2017-06-01 RX ADMIN — LOPERAMIDE HYDROCHLORIDE 4 MG: 1 SOLUTION ORAL at 16:10

## 2017-06-01 RX ADMIN — FENTANYL CITRATE 50 MCG: 50 INJECTION, SOLUTION INTRAMUSCULAR; INTRAVENOUS at 07:56

## 2017-06-01 RX ADMIN — POTASSIUM CHLORIDE: 2 INJECTION, SOLUTION, CONCENTRATE INTRAVENOUS at 19:45

## 2017-06-01 RX ADMIN — Medication 6 MG: at 11:58

## 2017-06-01 RX ADMIN — LIDOCAINE 1 PATCH: 50 PATCH TOPICAL at 13:44

## 2017-06-01 RX ADMIN — OXYCODONE HYDROCHLORIDE 15 MG: 5 SOLUTION ORAL at 08:52

## 2017-06-01 RX ADMIN — LOPERAMIDE HYDROCHLORIDE 4 MG: 1 SOLUTION ORAL at 11:59

## 2017-06-01 RX ADMIN — GABAPENTIN 300 MG: 250 SOLUTION ORAL at 19:21

## 2017-06-01 RX ADMIN — SIMETHICONE 40 MG: 20 SUSPENSION/ DROPS ORAL at 19:19

## 2017-06-01 RX ADMIN — GABAPENTIN 300 MG: 250 SOLUTION ORAL at 04:08

## 2017-06-01 RX ADMIN — Medication 1 PACKET: at 19:23

## 2017-06-01 RX ADMIN — I.V. FAT EMULSION 250 ML: 20 EMULSION INTRAVENOUS at 19:45

## 2017-06-01 RX ADMIN — SODIUM CHLORIDE, POTASSIUM CHLORIDE, SODIUM LACTATE AND CALCIUM CHLORIDE: 600; 310; 30; 20 INJECTION, SOLUTION INTRAVENOUS at 07:45

## 2017-06-01 RX ADMIN — POTASSIUM PHOSPHATE, MONOBASIC AND POTASSIUM PHOSPHATE, DIBASIC 15 MMOL: 224; 236 INJECTION, SOLUTION INTRAVENOUS at 21:45

## 2017-06-01 RX ADMIN — ACETAMINOPHEN 975 MG: 160 SUSPENSION ORAL at 01:02

## 2017-06-01 RX ADMIN — TRAZODONE HYDROCHLORIDE 100 MG: 100 TABLET ORAL at 21:49

## 2017-06-01 RX ADMIN — Medication 10 ML: at 16:10

## 2017-06-01 RX ADMIN — OXYCODONE HYDROCHLORIDE 15 MG: 5 SOLUTION ORAL at 01:02

## 2017-06-01 RX ADMIN — METOPROLOL TARTRATE 25 MG: 100 TABLET ORAL at 19:22

## 2017-06-01 RX ADMIN — Medication 1 PACKET: at 15:00

## 2017-06-01 RX ADMIN — MINERAL SUPPLEMENT IRON 300 MG / 5 ML STRENGTH LIQUID 100 PER BOX UNFLAVORED 300 MG: at 15:07

## 2017-06-01 RX ADMIN — OXYCODONE HYDROCHLORIDE 15 MG: 5 SOLUTION ORAL at 21:48

## 2017-06-01 RX ADMIN — FLUCONAZOLE 200 MG: 2 INJECTION INTRAVENOUS at 20:41

## 2017-06-01 RX ADMIN — FENTANYL CITRATE 50 MCG: 50 INJECTION, SOLUTION INTRAMUSCULAR; INTRAVENOUS at 07:58

## 2017-06-01 RX ADMIN — PROPOFOL 125 MCG/KG/MIN: 10 INJECTION, EMULSION INTRAVENOUS at 07:56

## 2017-06-01 RX ADMIN — HEPARIN SODIUM 5000 UNITS: 5000 INJECTION, SOLUTION INTRAVENOUS; SUBCUTANEOUS at 21:48

## 2017-06-01 RX ADMIN — PIPERACILLIN SODIUM,TAZOBACTAM SODIUM 3.38 G: 3; .375 INJECTION, POWDER, FOR SOLUTION INTRAVENOUS at 00:17

## 2017-06-01 RX ADMIN — HYDROMORPHONE HYDROCHLORIDE 0.5 MG: 1 INJECTION, SOLUTION INTRAMUSCULAR; INTRAVENOUS; SUBCUTANEOUS at 05:43

## 2017-06-01 RX ADMIN — HEPARIN SODIUM 5000 UNITS: 5000 INJECTION, SOLUTION INTRAVENOUS; SUBCUTANEOUS at 04:20

## 2017-06-01 RX ADMIN — OXYCODONE HYDROCHLORIDE 15 MG: 5 SOLUTION ORAL at 11:57

## 2017-06-01 RX ADMIN — Medication 6 MG: at 21:49

## 2017-06-01 RX ADMIN — Medication 6 MG: at 04:08

## 2017-06-01 RX ADMIN — Medication 2 G: at 14:51

## 2017-06-01 RX ADMIN — PANTOPRAZOLE SODIUM 40 MG: 40 TABLET, DELAYED RELEASE ORAL at 15:02

## 2017-06-01 RX ADMIN — Medication 6 MG: at 16:10

## 2017-06-01 RX ADMIN — METOPROLOL TARTRATE 25 MG: 100 TABLET ORAL at 12:01

## 2017-06-01 RX ADMIN — HEPARIN SODIUM 5000 UNITS: 5000 INJECTION, SOLUTION INTRAVENOUS; SUBCUTANEOUS at 13:38

## 2017-06-01 RX ADMIN — FLUCONAZOLE 200 MG: 2 INJECTION INTRAVENOUS at 11:52

## 2017-06-01 RX ADMIN — ACETAMINOPHEN 975 MG: 160 SUSPENSION ORAL at 11:58

## 2017-06-01 ASSESSMENT — ENCOUNTER SYMPTOMS: DYSRHYTHMIAS: 1

## 2017-06-01 NOTE — PROGRESS NOTES
CLINICAL NUTRITION SERVICES - BRIEF NOTE  *See last RD note on 5/31 for last nutrition reassessment details    INTERVENTIONS  Implementation  Ordered BMP, Mg++, and Phos (none ordered today) to assess ability to advance dextrose in TPN and monitor ongoing refeeding risk while advancing nutrition support (TF + TPN)    Monitoring/Evaluation  Progress toward goals will be monitored and evaluated per protocol.     Evie Montanez RD, LD   7B RD Pager: [phone]

## 2017-06-01 NOTE — ANESTHESIA POSTPROCEDURE EVALUATION
Patient: Minerva Blanco    Procedure(s):  Irrigation and Debridement Chest Washout , PICC line dressing change - Wound Class: IV-Dirty or Infected    Diagnosis:Non healing wound   Diagnosis Additional Information: No value filed.    Anesthesia Type:  MAC    Note:  Anesthesia Post Evaluation    Patient location during evaluation: Bedside  Patient participation: Able to fully participate in evaluation  Level of consciousness: awake  Pain management: adequate  Airway patency: patent  Cardiovascular status: acceptable  Respiratory status: acceptable  Hydration status: acceptable  PONV: none     Anesthetic complications: None          Last vitals:  Vitals:    05/31/17 2100 05/31/17 2325 06/01/17 0830   BP: 139/53 148/55 142/51   Pulse:      Resp: 14 16 16   Temp: 35.6  C (96  F) 35.2  C (95.4  F) 35.5  C (95.9  F)   SpO2: 98% 100% 100%         Electronically Signed By: Hailey Zaragoza MD  June 1, 2017  8:55 AM

## 2017-06-01 NOTE — OR NURSING
Pt in PreOp,  Enrique present. Has Tube Feeding running via GJ tube at 30mL/hour: order from Thoracic team states not to interrupt TF for OR. Dr. Zapata informed. Has 2 PIV's saline locked. Has R upper arm PICC line with leaking at site noted. It is a known issue. Pt said it has been checked by vascular access and it is working. Has drsg over site.

## 2017-06-01 NOTE — BRIEF OP NOTE
[facility], [place]    Brief Operative Note    Pre-operative diagnosis: Chest Wound   Post-operative diagnosis Chest Wound   Procedure: Procedure(s):  Chest Wound Irrigation And Debridement with dressing change  - Wound Class: I-Clean  Surgeon: Surgeon(s) and Role:     * Nalini Barreto MD - Primary     * Storm Whitlock MD - Resident - Assisting  Anesthesia: General   Estimated blood loss: none  Drains:  prior chest tube left in place  Specimens: none  Findings:   Minimal fibrinous exudate  Complications: none  Implants: none

## 2017-06-01 NOTE — ANESTHESIA POSTPROCEDURE EVALUATION
Patient: Minerva Blanco    Procedure(s):  Chest Wound Irrigation And Debridement with dressing change  - Wound Class: I-Clean    Diagnosis:Chest Wound   Diagnosis Additional Information: No value filed.    Anesthesia Type:  General, ETT, RSI    Note:  Anesthesia Post Evaluation    Patient location during evaluation: Floor  Patient participation: Able to fully participate in evaluation  Level of consciousness: awake and alert  Pain management: adequate  Airway patency: patent  Cardiovascular status: acceptable  Respiratory status: acceptable  Hydration status: acceptable  PONV: none     Anesthetic complications: None          Last vitals:  Vitals:    06/01/17 0911 06/01/17 0924 06/01/17 1004   BP: 143/60 133/57 133/56   Pulse:      Resp: 16 16 16   Temp:      SpO2: 100% 99% 99%         Electronically Signed By: Femi Zapata MD  June 1, 2017  11:04 AM

## 2017-06-01 NOTE — PLAN OF CARE
Problem: Goal Outcome Summary  Goal: Goal Outcome Summary  Outcome: No Change  /53 (BP Location: Left arm)  Pulse 72  Temp 96  F (35.6  C) (Oral)  Resp 14  Ht 1.524 m (5')  Wt 47.6 kg (105 lb)  SpO2 98%  Breastfeeding? No  BMI 20.51 kg/m2   Afebrile, pain is controlled, crying and anxious about having the PICC replaced, leaking noted at the site, dressing re-dressed, pharyngostomy is intact, dried drainage at the site, 80cc of green drainage, draining to a gravity bag, J-tube is to enteral feedings, infusing 20cc/hr. Chest wound drain, dressing is CDI, draining to a gravity bag, brown drainage. +BS, voiding adequately, moderate loose stools. Up to the commode at the bedside. TPN/lipids infusing as per orders. Cont with POC

## 2017-06-01 NOTE — OP NOTE
DATE OF PROCEDURE:  2017.      PREOPERATIVE DIAGNOSIS:  Open chest wound, status post anastomotic leak after esophagectomy.      POSTOPERATIVE DIAGNOSIS:   Open chest wound, status post anastomotic leak after esophagectomy.      PROCEDURE PERFORMED:  Chest wound washout and dressing change.      DESCRIPTION OF PROCEDURE:  After obtaining informed consent, the patient was brought to the operating room and laid supine on the operating table with head up to avoid aspiration.  The packing from the day before was removed and the cavity was washed with warm saline.  After thoroughly washing out the cavity, it was repacked again with Kerlix gauze.  The procedure was done with deep sedation and monitored anesthesia care.  The patient tolerated the procedure well.  However, of note, the dressing change today was significant for more bilious drainage of the previous dressing.  The cavity still remained clean, however.         CONCHIS MARRERO MD             D: 2017 17:11   T: 2017 04:08   MT: gypsy      Name:     FAVIAN MALONE   MRN:      [id number]        Account:        [id number]   :      1945           Procedure Date: 2017      Document: [id number]

## 2017-06-01 NOTE — PLAN OF CARE
Problem: Goal Outcome Summary  Goal: Goal Outcome Summary  Outcome: Improving  Returned from OR approximately 0900. Alert and VSS. Up to commode independently. Pt states she had diarrhea x9 overnight. Walked in [place] with friend with good tolerance. Oxycodone given ever 3 hours as scheduled.

## 2017-06-01 NOTE — ANESTHESIA PREPROCEDURE EVALUATION
Anesthesia Evaluation     . Pt has had prior anesthetic. Type: General and MAC    No history of anesthetic complications          ROS/MED HX    ENT/Pulmonary:  - neg pulmonary ROS     Neurologic: Comment: Meniere's Disease     (+)Multiple Sclerosis     Cardiovascular:  - neg cardiovascular ROS   (+) ----. Taking blood thinners : . . . :. dysrhythmias a-fib, . Previous cardiac testing Echodate:results:Interpretation Summary  Technically difficult study.  Global and regional left ventricular function is hyperdynamic with an LVEF  >70%.  The right ventricular function is hyperdynamic.  Mild pulmonary hypertension is present.  The inferior vena cava is normal. The estimated mean right atrial pressure is  <3 mmHg.  No pericardial effusion is present.  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 complicated by esophageal leak and mediastinal abscess s/p serial washouts under MAC; hiatal hernia; IBS    (+) GERD hiatal hernia,       Renal/Genitourinary:  - ROS Renal section negative       Endo:  - neg endo ROS       Psychiatric:  - neg psychiatric ROS       Infectious Disease: Comment: See GI  - neg infectious disease ROS       Malignancy:   (+) Malignancy History of Breast          Other: Comment: Fibromyalgia                     Physical Exam  Normal systems: cardiovascular, pulmonary and dental    Airway   Mallampati: II  TM distance: >3 FB  Neck ROM: full    Dental     Cardiovascular   Rhythm and rate: regular and normal      Pulmonary                     Anesthesia Plan      History & Physical Review  History and physical reviewed and following examination; no interval change.    ASA Status:  3 .    NPO Status:   > 2 hours    Plan for General, ETT and RSI with Intravenous induction. Maintenance will be Balanced.    PONV prophylaxis:  Ondansetron (or other 5HT-3) and Dexamethasone or Solumedrol       Postoperative Care  Postoperative pain management:  IV analgesics and Multi-modal analgesia.  Plan for postoperative opioid use.    Consents  Anesthetic plan, risks, benefits and alternatives discussed with:  Patient.  Use of blood products discussed: Yes.   Use of blood products discussed with Patient.  Consented to blood products.  .                          .

## 2017-06-01 NOTE — ANESTHESIA CARE TRANSFER NOTE
Patient: Minerva Blanco    Procedure(s):  Chest Wound Irrigation And Debridement with dressing change  - Wound Class: I-Clean    Diagnosis: Chest Wound   Diagnosis Additional Information: No value filed.    Anesthesia Type:   General, ETT, RSI     Note:  Airway :Nasal Cannula  Patient transferred to:Medical/Surgical Unit  Comments: Stable, report called to 6B, SATS 100%      Vitals: (Last set prior to Anesthesia Care Transfer)    CRNA VITALS  6/1/2017 0754 - 6/1/2017 0824      6/1/2017             Resp Rate (set): 10                Electronically Signed By: CLIVE Mari CRNA  June 1, 2017  8:24 AM

## 2017-06-02 ENCOUNTER — ANESTHESIA (OUTPATIENT)
Dept: SURGERY | Facility: CLINIC | Age: 72
DRG: 856 | End: 2017-06-02
Payer: MEDICARE

## 2017-06-02 LAB
ANION GAP SERPL CALCULATED.3IONS-SCNC: 9 MMOL/L (ref 3–14)
BUN SERPL-MCNC: 11 MG/DL (ref 7–30)
CALCIUM SERPL-MCNC: 8 MG/DL (ref 8.5–10.1)
CHLORIDE SERPL-SCNC: 107 MMOL/L (ref 94–109)
CO2 SERPL-SCNC: 23 MMOL/L (ref 20–32)
CREAT SERPL-MCNC: 0.4 MG/DL (ref 0.52–1.04)
GFR SERPL CREATININE-BSD FRML MDRD: ABNORMAL ML/MIN/1.7M2
GLUCOSE BLDC GLUCOMTR-MCNC: 135 MG/DL (ref 70–99)
GLUCOSE BLDC GLUCOMTR-MCNC: 97 MG/DL (ref 70–99)
GLUCOSE SERPL-MCNC: 89 MG/DL (ref 70–99)
MAGNESIUM SERPL-MCNC: 1.9 MG/DL (ref 1.6–2.3)
PHOSPHATE SERPL-MCNC: 3 MG/DL (ref 2.5–4.5)
POTASSIUM SERPL-SCNC: 4.8 MMOL/L (ref 3.4–5.3)
SODIUM SERPL-SCNC: 139 MMOL/L (ref 133–144)

## 2017-06-02 PROCEDURE — 25000128 H RX IP 250 OP 636: Performed by: THORACIC SURGERY (CARDIOTHORACIC VASCULAR SURGERY)

## 2017-06-02 PROCEDURE — 25000125 ZZHC RX 250

## 2017-06-02 PROCEDURE — 25000128 H RX IP 250 OP 636: Performed by: STUDENT IN AN ORGANIZED HEALTH CARE EDUCATION/TRAINING PROGRAM

## 2017-06-02 PROCEDURE — 36416 COLLJ CAPILLARY BLOOD SPEC: CPT | Performed by: STUDENT IN AN ORGANIZED HEALTH CARE EDUCATION/TRAINING PROGRAM

## 2017-06-02 PROCEDURE — 37000008 ZZH ANESTHESIA TECHNICAL FEE, 1ST 30 MIN: Performed by: STUDENT IN AN ORGANIZED HEALTH CARE EDUCATION/TRAINING PROGRAM

## 2017-06-02 PROCEDURE — 25000128 H RX IP 250 OP 636: Performed by: NURSE ANESTHETIST, CERTIFIED REGISTERED

## 2017-06-02 PROCEDURE — 84100 ASSAY OF PHOSPHORUS: CPT | Performed by: STUDENT IN AN ORGANIZED HEALTH CARE EDUCATION/TRAINING PROGRAM

## 2017-06-02 PROCEDURE — 27210429 ZZH NUTRITION PRODUCT INTERMEDIATE LITER

## 2017-06-02 PROCEDURE — 80048 BASIC METABOLIC PNL TOTAL CA: CPT | Performed by: STUDENT IN AN ORGANIZED HEALTH CARE EDUCATION/TRAINING PROGRAM

## 2017-06-02 PROCEDURE — 25000132 ZZH RX MED GY IP 250 OP 250 PS 637: Mod: GY | Performed by: STUDENT IN AN ORGANIZED HEALTH CARE EDUCATION/TRAINING PROGRAM

## 2017-06-02 PROCEDURE — 36000057 ZZH SURGERY LEVEL 3 1ST 30 MIN - UMMC: Performed by: STUDENT IN AN ORGANIZED HEALTH CARE EDUCATION/TRAINING PROGRAM

## 2017-06-02 PROCEDURE — 37000009 ZZH ANESTHESIA TECHNICAL FEE, EACH ADDTL 15 MIN: Performed by: STUDENT IN AN ORGANIZED HEALTH CARE EDUCATION/TRAINING PROGRAM

## 2017-06-02 PROCEDURE — 0JD60ZZ EXTRACTION OF CHEST SUBCUTANEOUS TISSUE AND FASCIA, OPEN APPROACH: ICD-10-PCS | Performed by: STUDENT IN AN ORGANIZED HEALTH CARE EDUCATION/TRAINING PROGRAM

## 2017-06-02 PROCEDURE — A9270 NON-COVERED ITEM OR SERVICE: HCPCS | Mod: GY | Performed by: SURGERY

## 2017-06-02 PROCEDURE — 36000059 ZZH SURGERY LEVEL 3 EA 15 ADDTL MIN UMMC: Performed by: STUDENT IN AN ORGANIZED HEALTH CARE EDUCATION/TRAINING PROGRAM

## 2017-06-02 PROCEDURE — 25000125 ZZHC RX 250: Performed by: SURGERY

## 2017-06-02 PROCEDURE — A9270 NON-COVERED ITEM OR SERVICE: HCPCS | Mod: GY | Performed by: STUDENT IN AN ORGANIZED HEALTH CARE EDUCATION/TRAINING PROGRAM

## 2017-06-02 PROCEDURE — 00000146 ZZHCL STATISTIC GLUCOSE BY METER IP

## 2017-06-02 PROCEDURE — 83735 ASSAY OF MAGNESIUM: CPT | Performed by: STUDENT IN AN ORGANIZED HEALTH CARE EDUCATION/TRAINING PROGRAM

## 2017-06-02 PROCEDURE — 40000170 ZZH STATISTIC PRE-PROCEDURE ASSESSMENT II: Performed by: STUDENT IN AN ORGANIZED HEALTH CARE EDUCATION/TRAINING PROGRAM

## 2017-06-02 PROCEDURE — 25800025 ZZH RX 258: Performed by: STUDENT IN AN ORGANIZED HEALTH CARE EDUCATION/TRAINING PROGRAM

## 2017-06-02 PROCEDURE — 25000132 ZZH RX MED GY IP 250 OP 250 PS 637: Mod: GY | Performed by: SURGERY

## 2017-06-02 PROCEDURE — 12000003 ZZH R&B CRITICAL UMMC

## 2017-06-02 PROCEDURE — 25000125 ZZHC RX 250: Performed by: NURSE ANESTHETIST, CERTIFIED REGISTERED

## 2017-06-02 RX ORDER — FENTANYL CITRATE 50 UG/ML
INJECTION, SOLUTION INTRAMUSCULAR; INTRAVENOUS PRN
Status: DISCONTINUED | OUTPATIENT
Start: 2017-06-02 | End: 2017-06-02

## 2017-06-02 RX ORDER — PROPOFOL 10 MG/ML
INJECTION, EMULSION INTRAVENOUS CONTINUOUS PRN
Status: DISCONTINUED | OUTPATIENT
Start: 2017-06-02 | End: 2017-06-02

## 2017-06-02 RX ADMIN — LOPERAMIDE HYDROCHLORIDE 4 MG: 1 SOLUTION ORAL at 16:22

## 2017-06-02 RX ADMIN — FLUCONAZOLE 200 MG: 2 INJECTION INTRAVENOUS at 21:51

## 2017-06-02 RX ADMIN — LOPERAMIDE HYDROCHLORIDE 4 MG: 1 SOLUTION ORAL at 09:37

## 2017-06-02 RX ADMIN — OXYCODONE HYDROCHLORIDE 15 MG: 5 SOLUTION ORAL at 14:18

## 2017-06-02 RX ADMIN — MIDAZOLAM HYDROCHLORIDE 1 MG: 1 INJECTION, SOLUTION INTRAMUSCULAR; INTRAVENOUS at 11:16

## 2017-06-02 RX ADMIN — PIPERACILLIN SODIUM,TAZOBACTAM SODIUM 3.38 G: 3; .375 INJECTION, POWDER, FOR SOLUTION INTRAVENOUS at 01:17

## 2017-06-02 RX ADMIN — LIDOCAINE 1 PATCH: 50 PATCH TOPICAL at 10:04

## 2017-06-02 RX ADMIN — FENTANYL CITRATE 50 MCG: 50 INJECTION, SOLUTION INTRAMUSCULAR; INTRAVENOUS at 11:36

## 2017-06-02 RX ADMIN — OXYCODONE HYDROCHLORIDE 15 MG: 5 SOLUTION ORAL at 04:14

## 2017-06-02 RX ADMIN — Medication 10 ML: at 14:17

## 2017-06-02 RX ADMIN — METOPROLOL TARTRATE 25 MG: 100 TABLET ORAL at 09:39

## 2017-06-02 RX ADMIN — GABAPENTIN 300 MG: 250 SOLUTION ORAL at 14:25

## 2017-06-02 RX ADMIN — Medication 10 ML: at 16:23

## 2017-06-02 RX ADMIN — HEPARIN SODIUM 5000 UNITS: 5000 INJECTION, SOLUTION INTRAVENOUS; SUBCUTANEOUS at 21:33

## 2017-06-02 RX ADMIN — Medication 6 MG: at 10:02

## 2017-06-02 RX ADMIN — PANTOPRAZOLE SODIUM 40 MG: 40 TABLET, DELAYED RELEASE ORAL at 09:40

## 2017-06-02 RX ADMIN — Medication 6 MG: at 21:32

## 2017-06-02 RX ADMIN — ACETAMINOPHEN 975 MG: 160 SUSPENSION ORAL at 09:39

## 2017-06-02 RX ADMIN — OXYCODONE HYDROCHLORIDE 15 MG: 5 SOLUTION ORAL at 21:33

## 2017-06-02 RX ADMIN — METOPROLOL TARTRATE 25 MG: 100 TABLET ORAL at 21:32

## 2017-06-02 RX ADMIN — GABAPENTIN 300 MG: 250 SOLUTION ORAL at 21:32

## 2017-06-02 RX ADMIN — POTASSIUM CHLORIDE: 2 INJECTION, SOLUTION, CONCENTRATE INTRAVENOUS at 20:46

## 2017-06-02 RX ADMIN — Medication 10 ML: at 10:02

## 2017-06-02 RX ADMIN — PIPERACILLIN SODIUM,TAZOBACTAM SODIUM 3.38 G: 3; .375 INJECTION, POWDER, FOR SOLUTION INTRAVENOUS at 07:07

## 2017-06-02 RX ADMIN — HYDROMORPHONE HYDROCHLORIDE 0.5 MG: 1 INJECTION, SOLUTION INTRAMUSCULAR; INTRAVENOUS; SUBCUTANEOUS at 16:35

## 2017-06-02 RX ADMIN — Medication 10 ML: at 20:57

## 2017-06-02 RX ADMIN — Medication 2 G: at 13:45

## 2017-06-02 RX ADMIN — MINERAL SUPPLEMENT IRON 300 MG / 5 ML STRENGTH LIQUID 100 PER BOX UNFLAVORED 300 MG: at 10:11

## 2017-06-02 RX ADMIN — LOPERAMIDE HYDROCHLORIDE 4 MG: 1 SOLUTION ORAL at 21:32

## 2017-06-02 RX ADMIN — TRAZODONE HYDROCHLORIDE 100 MG: 100 TABLET ORAL at 21:32

## 2017-06-02 RX ADMIN — OXYCODONE HYDROCHLORIDE 15 MG: 5 SOLUTION ORAL at 10:00

## 2017-06-02 RX ADMIN — GABAPENTIN 300 MG: 250 SOLUTION ORAL at 04:15

## 2017-06-02 RX ADMIN — HEPARIN SODIUM 5000 UNITS: 5000 INJECTION, SOLUTION INTRAVENOUS; SUBCUTANEOUS at 06:09

## 2017-06-02 RX ADMIN — Medication 6 MG: at 16:23

## 2017-06-02 RX ADMIN — HEPARIN SODIUM 5000 UNITS: 5000 INJECTION, SOLUTION INTRAVENOUS; SUBCUTANEOUS at 14:40

## 2017-06-02 RX ADMIN — I.V. FAT EMULSION 250 ML: 20 EMULSION INTRAVENOUS at 20:45

## 2017-06-02 RX ADMIN — POTASSIUM CHLORIDE, DEXTROSE MONOHYDRATE AND SODIUM CHLORIDE: 150; 5; 450 INJECTION, SOLUTION INTRAVENOUS at 06:05

## 2017-06-02 RX ADMIN — Medication 1 PACKET: at 14:40

## 2017-06-02 RX ADMIN — OXYCODONE HYDROCHLORIDE 15 MG: 5 SOLUTION ORAL at 18:12

## 2017-06-02 RX ADMIN — ACETAMINOPHEN 975 MG: 160 SUSPENSION ORAL at 00:40

## 2017-06-02 RX ADMIN — ACETAMINOPHEN 975 MG: 160 SUSPENSION ORAL at 16:23

## 2017-06-02 RX ADMIN — LOPERAMIDE HYDROCHLORIDE 4 MG: 1 SOLUTION ORAL at 14:17

## 2017-06-02 RX ADMIN — PIPERACILLIN SODIUM,TAZOBACTAM SODIUM 3.38 G: 3; .375 INJECTION, POWDER, FOR SOLUTION INTRAVENOUS at 15:01

## 2017-06-02 RX ADMIN — OXYCODONE HYDROCHLORIDE 15 MG: 5 SOLUTION ORAL at 01:00

## 2017-06-02 RX ADMIN — FENTANYL CITRATE 50 MCG: 50 INJECTION, SOLUTION INTRAMUSCULAR; INTRAVENOUS at 11:33

## 2017-06-02 RX ADMIN — PIPERACILLIN SODIUM,TAZOBACTAM SODIUM 3.38 G: 3; .375 INJECTION, POWDER, FOR SOLUTION INTRAVENOUS at 20:41

## 2017-06-02 RX ADMIN — SIMETHICONE 40 MG: 20 SUSPENSION/ DROPS ORAL at 18:12

## 2017-06-02 RX ADMIN — Medication 6 MG: at 04:14

## 2017-06-02 RX ADMIN — PROPOFOL 50 MCG/KG/MIN: 10 INJECTION, EMULSION INTRAVENOUS at 11:27

## 2017-06-02 RX ADMIN — OXYCODONE HYDROCHLORIDE 15 MG: 5 SOLUTION ORAL at 07:07

## 2017-06-02 ASSESSMENT — ENCOUNTER SYMPTOMS: DYSRHYTHMIAS: 1

## 2017-06-02 NOTE — PROGRESS NOTES
Thoracic surgery progress note  17    No issues overnight. Continues to have loose frequent stools. Tube feeds still running at 30.     Temp: 96.2  F (35.7  C) Temp  Min: 95.9  F (35.5  C)  Max: 97  F (36.1  C)  Resp: 18 Resp  Min: 16  Max: 18  SpO2: 97 % SpO2  Min: 97 %  Max: 100 %    No Data Recorded  Heart Rate: 66 Heart Rate  Min: 61  Max: 70  BP: 145/53 Systolic (24hrs), Av , Min:133 , Max:145   Diastolic (24hrs), Av, Min:43, Max:60    A&O, NAD  Unlabored breathing on RA  Dressing reinforced - will evaluate in OR.   Chest tube w/ bilious drainage   Pharyngostomy tube funcitoning.   Abd soft    I&O  Chest tube  200  Pharyngostomy tube 500    72F w/ chronic esophageal perforation s/p Nissen at OSH, now s/p esophagectomy with gastric pullup c/b recurrent anastamotic leak and mediastinal abscess s/p serial washouts      - Continue TPN  - Strict NPO.   - Continue tube feeds as able to tolerate with diarrhea  - Continue pharyngostomy tube to LIWS  - Chest tube to gravity  - C/w Zosyn/Fluc ( - )  - Heparin SQ  - OR today for dressing change    Seen w/ thoracic team,    Viki Carmen MD  General Surgery Resident  Pager: [phone]

## 2017-06-02 NOTE — ANESTHESIA PREPROCEDURE EVALUATION
Anesthesia Evaluation     . Pt has had prior anesthetic. Type: General and MAC           ROS/MED HX    ENT/Pulmonary:       Neurologic:     (+)Multiple Sclerosis     Cardiovascular:     (+) ----. Taking blood thinners : . . . :. dysrhythmias a-fib, . Previous cardiac testing Echodate:results:Interpretation Summary  Technically difficult study.  Global and regional left ventricular function is hyperdynamic with an LVEF  >70%.  The right ventricular function is hyperdynamic.  Mild pulmonary hypertension is present.  The inferior vena cava is normal. The estimated mean right atrial pressure is  <3 mmHg.  No pericardial effusion is present.  Previous study not available for comparison.date: results:ECG reviewed date: results:Sinus tachycardia () date: results:          METS/Exercise Tolerance:     Hematologic:     (+) Anemia, -      Musculoskeletal:  - neg musculoskeletal ROS       GI/Hepatic:     (+) hiatal hernia,       Renal/Genitourinary:  - ROS Renal section negative       Endo:  - neg endo ROS       Psychiatric:  - neg psychiatric ROS       Infectious Disease: Comment: See GI  - neg infectious disease ROS       Malignancy:   (+) Malignancy History of Breast          Other: Comment: Fibromyalgia                     Physical Exam      Airway     Dental     Cardiovascular   Rhythm and rate: regular and normal      Pulmonary    breath sounds clear to auscultation                        Anesthesia Plan      History & Physical Review      ASA Status:  3 .    NPO Status:  > 2 hours and > 8 hours    Plan for General, ETT and RSI with Intravenous and Propofol induction. Maintenance will be Balanced.    PONV prophylaxis:  Ondansetron (or other 5HT-3) and Dexamethasone or Solumedrol       Postoperative Care  Postoperative pain management:  IV analgesics and Multi-modal analgesia.  Plan for postoperative opioid use.    Consents  Anesthetic plan, risks, benefits and alternatives discussed with:  Patient.  Use of blood  products discussed: No .   Use of blood products discussed with Patient.  .        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  Plan: MAC    Sapphire Goss MD  Staff Anesthesiologist  Pager [phone]

## 2017-06-02 NOTE — PROGRESS NOTES
CLINICAL NUTRITION SERVICES - BRIEF NOTE  *See RD note on 5/31 for last nutrition reassessment details    INTERVENTIONS  Implementation  Collaboration with other providers: paged team and discussed with pharmacy starting to cycle TPN overnight (in case TPN becomes part of long-term nutrition plan)  Parenteral Nutrition/IV Fluids - Modify schedule (Pharmacy to start cycling PN)    Monitoring/Evaluation  Progress toward goals will be monitored and evaluated per protocol.     Evie Montanez, ALEX, LD   [address]B RD Pager: [phone]

## 2017-06-02 NOTE — PLAN OF CARE
Problem: Goal Outcome Summary  Goal: Goal Outcome Summary  Outcome: Improving  Pt had dressing changed in OR today. Medicated x2 with Oxycodoen 15mg with fair management of pain. Lungs clear but diminished. Less diarrhea today. Up to the commode independently. Pt wanted TF to run at 30/hr- on all of shift. Magnesium replaced. Lower extremities continue to be edematous. Pt encouraged to have legs up while in bed. Up on commode much of shift. Bg=89 and 135.

## 2017-06-02 NOTE — ANESTHESIA POSTPROCEDURE EVALUATION
Patient: Minerva Blanco    Procedure(s):  Chest Wound Irrigation and Debridement, Dressing Change - Wound Class: I-Clean    Diagnosis:Chest Wound   Diagnosis Additional Information: No value filed.    Anesthesia Type:  No value filed.    Note:  Anesthesia Post Evaluation    Patient location during evaluation: Bedside  Patient participation: Able to fully participate in evaluation  Level of consciousness: awake and alert  Pain management: adequate  Airway patency: patent  Cardiovascular status: acceptable  Respiratory status: acceptable  Hydration status: acceptable  PONV: none     Anesthetic complications: None          Last vitals:  Vitals:    06/02/17 0100 06/02/17 0726 06/02/17 1206   BP: 145/53 146/66 107/55   Pulse:      Resp: 18 18 16   Temp: 35.7  C (96.2  F) 35.7  C (96.2  F) 36.6  C (97.8  F)   SpO2: 97% 99% 100%         Electronically Signed By: Sapphire Goss MD  June 2, 2017  12:17 PM

## 2017-06-02 NOTE — PLAN OF CARE
Problem: Goal Outcome Summary  Goal: Goal Outcome Summary  Outcome: No Change  Alert and oriented x 4. Denies nausea and complained of pain in the abdomen. Oxycodone given as scheduled. Up to the BSC independently. Having watery stool and also voiding. Antibiotic given. TPN and lipids infusing. Chest dressing, clean, dry and intact. G tube to gravity and pharyngostomy tube to LIS. Tube feeding at 30 ml/ hr. It was at 20 ml/hr earlier and pt stated that she changes back and fort as needed.

## 2017-06-02 NOTE — ANESTHESIA CARE TRANSFER NOTE
Patient: Minerva Blanco    Procedure(s):  Chest Wound Irrigation And Debridement  - Wound Class: I-Clean    Diagnosis: Chest Wound   Diagnosis Additional Information: No value filed.    Anesthesia Type:   No value filed.     Note:  Airway :Face Mask  Patient transferred to:Medical/Surgical Unit        Vitals: (Last set prior to Anesthesia Care Transfer)    CRNA VITALS  6/2/2017 1116 - 6/2/2017 1147      6/2/2017             Pulse: 56    SpO2: 100 %    Resp Rate (set): 10                Electronically Signed By: CLIVE Mehta CRNA  June 2, 2017  11:47 AM

## 2017-06-02 NOTE — OP NOTE
DATE OF PROCEDURE:  2017      PREOPERATIVE DIAGNOSIS:  Esophageal leak status post esophagectomy.      POSTOPERATIVE DIAGNOSIS:  Esophageal leak status post esophagectomy.       SURGEON:  Conchis Marrero MD.      ASSISTANT SURGEON:  Kd Liu MD.        DESCRIPTION OF PROCEDURE:  After obtaining informed consent, Favian Santoro was brought to the operating room and laid supine on the operating table.  After induction of general anesthesia, introduction of endotracheal tube, a pediatric endoscope was passed per orally into the esophagus.  At the anastomosis, however, there was free communication between the mediastinal space and the GI tract, where the scope very easily passed into the mediastinal space.  After significant maneuvering, we were able to get the scope down the GI tract and into the stomach.  A 260 Super Stiff Amplatz wire was passed through the scope into the bowel using fluoroscopic guidance.  After this, an 18-Kosovan pharyngostomy tube was passed over the wire to lie in the conduit.  An 18-Kosovan NG tube was used to make a pharyngostomy tract *** manipulate the posterior pharyngeal pillar was visualized and the tract was made just posterior to this to emerge below the left mastoid.  The pharyngostomy tube was then guided into the tract using the 18-Kosovan NG tube.  This was secured to the skin and dressings were applied.  We then turned our attention to washing out the chest wound.  The old packing was removed.  The chest was then washed out adequately with warm saline and was repacked using Kerlix gauze.  The patient tolerated the procedure well.         CONCHIS MARRERO MD             D: 2017 17:25   T: 2017 19:15   MT: TD      Name:     FAVIAN MALONE   MRN:      [id number]        Account:        [id number]   :      1945           Procedure Date: 2017      Document: [id number]

## 2017-06-02 NOTE — PLAN OF CARE
Problem: Goal Outcome Summary  Goal: Goal Outcome Summary  Vitals:     06/01/17 1004 06/01/17 1155 06/01/17 1553 06/01/17 2019   BP: 133/56 137/58 141/58 138/43   BP Location: Left arm Left arm Left arm Left arm   Cuff Size:           Pulse:           Resp: 16 16 16 16   Temp:     96.7  F (35.9  C) 97  F (36.1  C)   TempSrc:     Oral Oral   SpO2: 99% 100% 98% 97%   Weight:       48.8 kg (107 lb 8 oz)   Height:             Pt is alert and oriented.Phosphorus replacement running after that zosyn need to start.Pt is having continuous loose stool.Chest dressing intact connected to ojeda.Spit fistula to LIS.TPN and lipids running through PICC line.Dressing changed in the tube feeding site x 3 .Will monitor.

## 2017-06-03 ENCOUNTER — APPOINTMENT (OUTPATIENT)
Dept: OCCUPATIONAL THERAPY | Facility: CLINIC | Age: 72
DRG: 856 | End: 2017-06-03
Attending: STUDENT IN AN ORGANIZED HEALTH CARE EDUCATION/TRAINING PROGRAM
Payer: MEDICARE

## 2017-06-03 ENCOUNTER — APPOINTMENT (OUTPATIENT)
Dept: GENERAL RADIOLOGY | Facility: CLINIC | Age: 72
DRG: 856 | End: 2017-06-03
Attending: STUDENT IN AN ORGANIZED HEALTH CARE EDUCATION/TRAINING PROGRAM
Payer: MEDICARE

## 2017-06-03 LAB — PLATELET # BLD AUTO: 412 10E9/L (ref 150–450)

## 2017-06-03 PROCEDURE — 99211 OFF/OP EST MAY X REQ PHY/QHP: CPT

## 2017-06-03 PROCEDURE — A9270 NON-COVERED ITEM OR SERVICE: HCPCS | Mod: GY | Performed by: SURGERY

## 2017-06-03 PROCEDURE — 25000128 H RX IP 250 OP 636: Performed by: STUDENT IN AN ORGANIZED HEALTH CARE EDUCATION/TRAINING PROGRAM

## 2017-06-03 PROCEDURE — 25800025 ZZH RX 258: Performed by: STUDENT IN AN ORGANIZED HEALTH CARE EDUCATION/TRAINING PROGRAM

## 2017-06-03 PROCEDURE — 25000132 ZZH RX MED GY IP 250 OP 250 PS 637: Mod: GY | Performed by: SURGERY

## 2017-06-03 PROCEDURE — 97140 MANUAL THERAPY 1/> REGIONS: CPT | Mod: GO

## 2017-06-03 PROCEDURE — 40000133 ZZH STATISTIC OT WARD VISIT

## 2017-06-03 PROCEDURE — 85049 AUTOMATED PLATELET COUNT: CPT | Performed by: THORACIC SURGERY (CARDIOTHORACIC VASCULAR SURGERY)

## 2017-06-03 PROCEDURE — 25000128 H RX IP 250 OP 636: Performed by: THORACIC SURGERY (CARDIOTHORACIC VASCULAR SURGERY)

## 2017-06-03 PROCEDURE — 25000125 ZZHC RX 250

## 2017-06-03 PROCEDURE — 71010 XR CHEST PORT 1 VW: CPT

## 2017-06-03 PROCEDURE — A9270 NON-COVERED ITEM OR SERVICE: HCPCS | Mod: GY | Performed by: STUDENT IN AN ORGANIZED HEALTH CARE EDUCATION/TRAINING PROGRAM

## 2017-06-03 PROCEDURE — 12000003 ZZH R&B CRITICAL UMMC

## 2017-06-03 PROCEDURE — 40000940 XR CHEST PORT 1 VW

## 2017-06-03 PROCEDURE — 40000558 ZZH STATISTIC CVC DRESSING CHANGE

## 2017-06-03 PROCEDURE — 97535 SELF CARE MNGMENT TRAINING: CPT | Mod: GO

## 2017-06-03 PROCEDURE — 25000132 ZZH RX MED GY IP 250 OP 250 PS 637: Mod: GY | Performed by: STUDENT IN AN ORGANIZED HEALTH CARE EDUCATION/TRAINING PROGRAM

## 2017-06-03 PROCEDURE — 36592 COLLECT BLOOD FROM PICC: CPT | Performed by: THORACIC SURGERY (CARDIOTHORACIC VASCULAR SURGERY)

## 2017-06-03 RX ADMIN — FLUCONAZOLE 200 MG: 2 INJECTION INTRAVENOUS at 23:30

## 2017-06-03 RX ADMIN — OXYCODONE HYDROCHLORIDE 15 MG: 5 SOLUTION ORAL at 16:47

## 2017-06-03 RX ADMIN — ACETAMINOPHEN 975 MG: 160 SUSPENSION ORAL at 00:51

## 2017-06-03 RX ADMIN — LOPERAMIDE HYDROCHLORIDE 4 MG: 1 SOLUTION ORAL at 16:46

## 2017-06-03 RX ADMIN — OXYCODONE HYDROCHLORIDE 15 MG: 5 SOLUTION ORAL at 19:45

## 2017-06-03 RX ADMIN — PIPERACILLIN SODIUM,TAZOBACTAM SODIUM 3.38 G: 3; .375 INJECTION, POWDER, FOR SOLUTION INTRAVENOUS at 22:16

## 2017-06-03 RX ADMIN — Medication 10 ML: at 19:46

## 2017-06-03 RX ADMIN — SIMETHICONE 40 MG: 20 SUSPENSION/ DROPS ORAL at 20:10

## 2017-06-03 RX ADMIN — HEPARIN SODIUM 5000 UNITS: 5000 INJECTION, SOLUTION INTRAVENOUS; SUBCUTANEOUS at 13:18

## 2017-06-03 RX ADMIN — Medication 10 ML: at 13:16

## 2017-06-03 RX ADMIN — HEPARIN SODIUM 5000 UNITS: 5000 INJECTION, SOLUTION INTRAVENOUS; SUBCUTANEOUS at 22:17

## 2017-06-03 RX ADMIN — METOPROLOL TARTRATE 25 MG: 100 TABLET ORAL at 13:03

## 2017-06-03 RX ADMIN — ACETAMINOPHEN 975 MG: 160 SUSPENSION ORAL at 19:45

## 2017-06-03 RX ADMIN — Medication 6 MG: at 22:17

## 2017-06-03 RX ADMIN — OXYCODONE HYDROCHLORIDE 15 MG: 5 SOLUTION ORAL at 09:59

## 2017-06-03 RX ADMIN — PIPERACILLIN SODIUM,TAZOBACTAM SODIUM 3.38 G: 3; .375 INJECTION, POWDER, FOR SOLUTION INTRAVENOUS at 02:17

## 2017-06-03 RX ADMIN — HEPARIN SODIUM 5000 UNITS: 5000 INJECTION, SOLUTION INTRAVENOUS; SUBCUTANEOUS at 06:48

## 2017-06-03 RX ADMIN — Medication 10 ML: at 08:58

## 2017-06-03 RX ADMIN — Medication 6 MG: at 16:47

## 2017-06-03 RX ADMIN — LOPERAMIDE HYDROCHLORIDE 4 MG: 1 SOLUTION ORAL at 19:47

## 2017-06-03 RX ADMIN — OXYCODONE HYDROCHLORIDE 15 MG: 5 SOLUTION ORAL at 22:17

## 2017-06-03 RX ADMIN — PIPERACILLIN SODIUM,TAZOBACTAM SODIUM 3.38 G: 3; .375 INJECTION, POWDER, FOR SOLUTION INTRAVENOUS at 13:23

## 2017-06-03 RX ADMIN — LOPERAMIDE HYDROCHLORIDE 4 MG: 1 SOLUTION ORAL at 08:59

## 2017-06-03 RX ADMIN — OXYCODONE HYDROCHLORIDE 15 MG: 5 SOLUTION ORAL at 03:52

## 2017-06-03 RX ADMIN — GABAPENTIN 300 MG: 250 SOLUTION ORAL at 13:04

## 2017-06-03 RX ADMIN — OXYCODONE HYDROCHLORIDE 15 MG: 5 SOLUTION ORAL at 06:48

## 2017-06-03 RX ADMIN — LOPERAMIDE HYDROCHLORIDE 4 MG: 1 SOLUTION ORAL at 13:04

## 2017-06-03 RX ADMIN — TRAZODONE HYDROCHLORIDE 100 MG: 100 TABLET ORAL at 22:17

## 2017-06-03 RX ADMIN — POTASSIUM CHLORIDE, DEXTROSE MONOHYDRATE AND SODIUM CHLORIDE: 150; 5; 450 INJECTION, SOLUTION INTRAVENOUS at 10:17

## 2017-06-03 RX ADMIN — PANTOPRAZOLE SODIUM 40 MG: 40 TABLET, DELAYED RELEASE ORAL at 08:58

## 2017-06-03 RX ADMIN — MINERAL SUPPLEMENT IRON 300 MG / 5 ML STRENGTH LIQUID 100 PER BOX UNFLAVORED 300 MG: at 08:58

## 2017-06-03 RX ADMIN — PIPERACILLIN SODIUM,TAZOBACTAM SODIUM 3.38 G: 3; .375 INJECTION, POWDER, FOR SOLUTION INTRAVENOUS at 08:57

## 2017-06-03 RX ADMIN — Medication 1 LOZENGE: at 20:09

## 2017-06-03 RX ADMIN — METOPROLOL TARTRATE 25 MG: 100 TABLET ORAL at 19:46

## 2017-06-03 RX ADMIN — Medication 6 MG: at 09:59

## 2017-06-03 RX ADMIN — OXYCODONE HYDROCHLORIDE 15 MG: 5 SOLUTION ORAL at 13:05

## 2017-06-03 RX ADMIN — Medication 10 ML: at 16:46

## 2017-06-03 RX ADMIN — OXYCODONE HYDROCHLORIDE 15 MG: 5 SOLUTION ORAL at 00:51

## 2017-06-03 RX ADMIN — HYDROMORPHONE HYDROCHLORIDE 0.5 MG: 1 INJECTION, SOLUTION INTRAMUSCULAR; INTRAVENOUS; SUBCUTANEOUS at 11:00

## 2017-06-03 RX ADMIN — GABAPENTIN 300 MG: 250 SOLUTION ORAL at 03:51

## 2017-06-03 RX ADMIN — ACETAMINOPHEN 975 MG: 160 SUSPENSION ORAL at 08:58

## 2017-06-03 RX ADMIN — Medication 6 MG: at 03:51

## 2017-06-03 RX ADMIN — GABAPENTIN 300 MG: 250 SOLUTION ORAL at 19:46

## 2017-06-03 NOTE — PLAN OF CARE
Problem: Goal Outcome Summary  Goal: Goal Outcome Summary  Vitals:     06/02/17 1337 06/02/17 1533 06/02/17 1958 06/02/17 2208   BP: 138/62 122/53 133/57 129/86   BP Location:   Left arm Left arm Left arm   Cuff Size:           Pulse:           Resp: 16 16 16 16   Temp:   97.1  F (36.2  C) 96.9  F (36.1  C) 98.8  F (37.1  C)   TempSrc:   Oral Oral Oral   SpO2: 100% 99% 97% 95%   Weight:   48.7 kg (107 lb 6.4 oz)       Height:             Pt is alert,Chest tube dressing intact,pharyngostomy tube to LIS.TPN and Lipids running through PICC line.tube feeding running through jejunostomy.Pt still have loose stool.Taking oxycodone and diludid for pain.BLE with 3+ pitting edema.Encourage pt to elevate leg. Will monitor.

## 2017-06-03 NOTE — PLAN OF CARE
Problem: Goal Outcome Summary  Goal: Goal Outcome Summary  Edema 7B: Pt with 2+ pitting in B LEs. GCBs applied MTP to knee crease to promote fluid mobilization, comfort, and reduce risk of infection. OK for pt to wear compression bandages until therapy returns. However, please remove any compression if it causes numbness, tingling, pain, becomes soiled, or if pt becomes unable to verbalize pain. Will follow up with patient.

## 2017-06-03 NOTE — PLAN OF CARE
Problem: Individualization  Goal: Patient Preferences     Patient alert and oriented x 4 and cooperative with nursing cares and medications. Her  was here for most of the day and is supportive and helpful. Patient is in constant pain and gets scheduled oxycodone 15 mg every 3 hours. TPN is infusing at 85 ml/hr. Maintenance IV is infusing at 75 cc/hr. She self regulate the tube feeding and it's been running at 16 ml/hr all day per the patient. Left neck drain is to intermittent low suction (green/brown output), medial chest open drain is to gravity bag drainage with tan, thick output. Patient get up to the bedside commode independently and is voiding good volumes and having diarrhea stool. She said the stool have been this way for a long time. This morning the surgeon changed the open check wound dressing at the bedside. Tomorrow, the patient is scheduled for and I & D of this wound in the OR. Refer to doc flow sheets, MAR, and notes for other specifics. Continue nursing cares per care plan. Keep doctors notified of changes or concerns.

## 2017-06-03 NOTE — PROGRESS NOTES
06/03/17 1146   General Information   Discipline OT   Onset of Edema 04/12/17   Affected Body Part(s) Left LE;Right LE   Etiology Comments Multiple surgeries, hypoalbuminemia, decreased muscle pump   Pertinent history of current problem (PT: include personal factors and/or comorbidities that impact the POC; OT: include additional occupational profile info) This is a 72 year old female with PMH significant for an esophageal perforation. She is most recently status post a retrosternal gastric pull through with manubrium resection, spit fistula take-down and placement of a jejunostomy tube in April. She was discharged post-operatively from that procedure on 4/28 however readmitted from 5/3-5/8 with a pneumonia. She was then readmitted on 5/18 for a chest wound infection. She was taken to the operating room today for further debridement and noted to have an esophageal perforation and a small associated abscess cavity. She had a partial rib resection and debridement. She is admitted to the SICU postoperatively for hemodynamic monitoring and pain control.    Edema Examination / Assessment   Skin Condition Pitting;Dryness   Skin Condition Comments Pt with petechiae/red bruising on legs, lines of red petechiae on L calf. Skin firm, dry, and dull in appearance. Pt with accumulated yellow dead skin build up on ankles. Toenails thick. Pt with soft 2+ pitting to knee crease, firm 1+ pitting to groin. Pt states that her thighs are significantly larger than baseline. Pt concerned for her skin integrity. States she has had variable edema since April.    Capillary Refill Comments ARTURO, toenails thick and nail polish present   Dorsal Pedal Pulse Symmetrical   Girth Measurements   Girth Measurements Refer to separate Girth Measurement flowsheet   Sensation   Sensation (WFL) (denies n/t)   Assessment/Plan   Patient presents with Edema   Assessment Pt to benefit from skilled OT to manage B LE edema to promote skin integrity, comfort,  and ease with ADLs. See daily flowsheet for details regarding today's treatment.    Assessment of Occupational Performance 3-5 Performance Deficits   Identified Performance Deficits showering, dressing, home management, functional mobility   Clinical Decision Making (Complexity) Low complexity   Planned Edema Interventions Gradient compression bandaging;Fit for compression garment;Exercises;Precautions to prevent infection/exacerbation;Education;ADL training   Treatment Frequency 5 times/wk  (+ OT 3x/week)   Treatment Duration 1 week   Patient, Family and/or Staff in agreement with plan of care. Yes   Risks and benefits of treatment have been explained. Yes   Total Evaluation Time   Total Evaluation Time (Minutes) 3

## 2017-06-03 NOTE — PLAN OF CARE
Problem: Goal Outcome Summary  Goal: Goal Outcome Summary  Vital signs:  Temp: 98.8  F (37.1  C) Temp src: Oral BP: 129/86   Heart Rate: 92 Resp: 16 SpO2: 95 % O2 Device: None (Room air)   VSS. Chest dressing coming untaped, reinforced at beginning of shift. Chest tube to gravity with scant output. TF's via J-tube pt turned them down to 26 mL/hr on evenings and continued this rate overnight. Pharyngostomy to LIS, brown output. TPN and lipids infusing via PICC. Encouraged pt to elevate feet overnight, pt refused saying it is too hard to get in and out of bed to get up to the commode with feet on pillows. Up to bedside commode, voiding adequate amounts. Sleeping throughout the night.

## 2017-06-03 NOTE — PROGRESS NOTES
Thoracic surgery progress note  17    No issues overnight. Diarrhea stable, turned tube feeds down to 25ml. No fevers or chills. Dressing came untaped overnight, redressed.     Temp: 98.1  F (36.7  C) Temp  Min: 96.9  F (36.1  C)  Max: 98.8  F (37.1  C)  Resp: 16 Resp  Min: 16  Max: 16  SpO2: 97 % SpO2  Min: 95 %  Max: 100 %    No Data Recorded  Heart Rate: 85 Heart Rate  Min: 59  Max: 92  BP: 139/65 Systolic (24hrs), Av , Min:107 , Max:139   Diastolic (24hrs), Av, Min:50, Max:86    A&O, NAD  Unlabored breathing on RA  Will perform dressing change later today  Chest tube w/ bilious drainage   Pharyngostomy tube funcitoning.   Abd soft    I&O  Chest tube  minimal  Pharyngostomy tube 800    72F w/ chronic esophageal perforation s/p Nissen at OSH, now s/p esophagectomy with gastric pullup c/b recurrent anastamotic leak and mediastinal abscess s/p serial washouts      - Continue TPN  - Strict NPO.   - Continue tube feeds as able to tolerate with diarrhea  - Continue pharyngostomy tube to LIWS  - Chest tube to gravity  - C/w Zosyn/Fluc ( - )  - Heparin SQ  - Bedside dressing change today. OR tomorrow for EGD, possible stent placement and dressing change.     Seen w/ thoracic team,    Viki Carmen MD  General Surgery Resident  Pager: [phone]

## 2017-06-04 ENCOUNTER — ANESTHESIA EVENT (OUTPATIENT)
Dept: SURGERY | Facility: CLINIC | Age: 72
DRG: 856 | End: 2017-06-04
Payer: MEDICARE

## 2017-06-04 ENCOUNTER — APPOINTMENT (OUTPATIENT)
Dept: OCCUPATIONAL THERAPY | Facility: CLINIC | Age: 72
DRG: 856 | End: 2017-06-04
Attending: STUDENT IN AN ORGANIZED HEALTH CARE EDUCATION/TRAINING PROGRAM
Payer: MEDICARE

## 2017-06-04 ENCOUNTER — APPOINTMENT (OUTPATIENT)
Dept: GENERAL RADIOLOGY | Facility: CLINIC | Age: 72
DRG: 856 | End: 2017-06-04
Attending: STUDENT IN AN ORGANIZED HEALTH CARE EDUCATION/TRAINING PROGRAM
Payer: MEDICARE

## 2017-06-04 ENCOUNTER — ANESTHESIA (OUTPATIENT)
Dept: SURGERY | Facility: CLINIC | Age: 72
DRG: 856 | End: 2017-06-04
Payer: MEDICARE

## 2017-06-04 LAB
ABO + RH BLD: NORMAL
ABO + RH BLD: NORMAL
APTT PPP: 44 SEC (ref 22–37)
BLD GP AB SCN SERPL QL: NORMAL
BLOOD BANK CMNT PATIENT-IMP: NORMAL
INR PPP: 1.07 (ref 0.86–1.14)
SPECIMEN EXP DATE BLD: NORMAL

## 2017-06-04 PROCEDURE — 25800025 ZZH RX 258: Performed by: STUDENT IN AN ORGANIZED HEALTH CARE EDUCATION/TRAINING PROGRAM

## 2017-06-04 PROCEDURE — 36000059 ZZH SURGERY LEVEL 3 EA 15 ADDTL MIN UMMC: Performed by: THORACIC SURGERY (CARDIOTHORACIC VASCULAR SURGERY)

## 2017-06-04 PROCEDURE — 86850 RBC ANTIBODY SCREEN: CPT | Performed by: STUDENT IN AN ORGANIZED HEALTH CARE EDUCATION/TRAINING PROGRAM

## 2017-06-04 PROCEDURE — 25000125 ZZHC RX 250: Performed by: ANESTHESIOLOGY

## 2017-06-04 PROCEDURE — A9270 NON-COVERED ITEM OR SERVICE: HCPCS | Mod: GY | Performed by: STUDENT IN AN ORGANIZED HEALTH CARE EDUCATION/TRAINING PROGRAM

## 2017-06-04 PROCEDURE — 40000170 ZZH STATISTIC PRE-PROCEDURE ASSESSMENT II: Performed by: THORACIC SURGERY (CARDIOTHORACIC VASCULAR SURGERY)

## 2017-06-04 PROCEDURE — 12000003 ZZH R&B CRITICAL UMMC

## 2017-06-04 PROCEDURE — 36000061 ZZH SURGERY LEVEL 3 W FLUORO 1ST 30 MIN - UMMC: Performed by: THORACIC SURGERY (CARDIOTHORACIC VASCULAR SURGERY)

## 2017-06-04 PROCEDURE — 25000566 ZZH SEVOFLURANE, EA 15 MIN: Performed by: THORACIC SURGERY (CARDIOTHORACIC VASCULAR SURGERY)

## 2017-06-04 PROCEDURE — 85610 PROTHROMBIN TIME: CPT | Performed by: STUDENT IN AN ORGANIZED HEALTH CARE EDUCATION/TRAINING PROGRAM

## 2017-06-04 PROCEDURE — 71010 XR CHEST PORT 1 VW: CPT

## 2017-06-04 PROCEDURE — 40000277 XR SURGERY CARM FLUORO LESS THAN 5 MIN W STILLS: Mod: TC

## 2017-06-04 PROCEDURE — C9399 UNCLASSIFIED DRUGS OR BIOLOG: HCPCS | Performed by: ANESTHESIOLOGY

## 2017-06-04 PROCEDURE — 71000014 ZZH RECOVERY PHASE 1 LEVEL 2 FIRST HR: Performed by: THORACIC SURGERY (CARDIOTHORACIC VASCULAR SURGERY)

## 2017-06-04 PROCEDURE — 27210195 ZZH KIT POWER PICC DOUBLE LUMEN

## 2017-06-04 PROCEDURE — 85730 THROMBOPLASTIN TIME PARTIAL: CPT | Performed by: STUDENT IN AN ORGANIZED HEALTH CARE EDUCATION/TRAINING PROGRAM

## 2017-06-04 PROCEDURE — 71000015 ZZH RECOVERY PHASE 1 LEVEL 2 EA ADDTL HR: Performed by: THORACIC SURGERY (CARDIOTHORACIC VASCULAR SURGERY)

## 2017-06-04 PROCEDURE — 86901 BLOOD TYPING SEROLOGIC RH(D): CPT | Performed by: STUDENT IN AN ORGANIZED HEALTH CARE EDUCATION/TRAINING PROGRAM

## 2017-06-04 PROCEDURE — 40000133 ZZH STATISTIC OT WARD VISIT

## 2017-06-04 PROCEDURE — 97535 SELF CARE MNGMENT TRAINING: CPT | Mod: GO

## 2017-06-04 PROCEDURE — 25000125 ZZHC RX 250: Performed by: SURGERY

## 2017-06-04 PROCEDURE — C1874 STENT, COATED/COV W/DEL SYS: HCPCS | Performed by: THORACIC SURGERY (CARDIOTHORACIC VASCULAR SURGERY)

## 2017-06-04 PROCEDURE — 25000128 H RX IP 250 OP 636: Performed by: ANESTHESIOLOGY

## 2017-06-04 PROCEDURE — A9270 NON-COVERED ITEM OR SERVICE: HCPCS | Mod: GY | Performed by: SURGERY

## 2017-06-04 PROCEDURE — 36592 COLLECT BLOOD FROM PICC: CPT | Performed by: STUDENT IN AN ORGANIZED HEALTH CARE EDUCATION/TRAINING PROGRAM

## 2017-06-04 PROCEDURE — 36569 INSJ PICC 5 YR+ W/O IMAGING: CPT

## 2017-06-04 PROCEDURE — 25000128 H RX IP 250 OP 636: Performed by: STUDENT IN AN ORGANIZED HEALTH CARE EDUCATION/TRAINING PROGRAM

## 2017-06-04 PROCEDURE — 0W9830Z DRAINAGE OF CHEST WALL WITH DRAINAGE DEVICE, PERCUTANEOUS APPROACH: ICD-10-PCS | Performed by: THORACIC SURGERY (CARDIOTHORACIC VASCULAR SURGERY)

## 2017-06-04 PROCEDURE — 0DH63UZ INSERTION OF FEEDING DEVICE INTO STOMACH, PERCUTANEOUS APPROACH: ICD-10-PCS | Performed by: THORACIC SURGERY (CARDIOTHORACIC VASCULAR SURGERY)

## 2017-06-04 PROCEDURE — 37000009 ZZH ANESTHESIA TECHNICAL FEE, EACH ADDTL 15 MIN: Performed by: THORACIC SURGERY (CARDIOTHORACIC VASCULAR SURGERY)

## 2017-06-04 PROCEDURE — 25000132 ZZH RX MED GY IP 250 OP 250 PS 637: Mod: GY | Performed by: SURGERY

## 2017-06-04 PROCEDURE — 40000986 XR CHEST PORT 1 VW

## 2017-06-04 PROCEDURE — 0D758DZ DILATION OF ESOPHAGUS WITH INTRALUMINAL DEVICE, VIA NATURAL OR ARTIFICIAL OPENING ENDOSCOPIC: ICD-10-PCS | Performed by: THORACIC SURGERY (CARDIOTHORACIC VASCULAR SURGERY)

## 2017-06-04 PROCEDURE — C1769 GUIDE WIRE: HCPCS

## 2017-06-04 PROCEDURE — 25000132 ZZH RX MED GY IP 250 OP 250 PS 637: Mod: GY | Performed by: STUDENT IN AN ORGANIZED HEALTH CARE EDUCATION/TRAINING PROGRAM

## 2017-06-04 PROCEDURE — 25000128 H RX IP 250 OP 636: Performed by: THORACIC SURGERY (CARDIOTHORACIC VASCULAR SURGERY)

## 2017-06-04 PROCEDURE — 27210794 ZZH OR GENERAL SUPPLY STERILE: Performed by: THORACIC SURGERY (CARDIOTHORACIC VASCULAR SURGERY)

## 2017-06-04 PROCEDURE — 37000008 ZZH ANESTHESIA TECHNICAL FEE, 1ST 30 MIN: Performed by: THORACIC SURGERY (CARDIOTHORACIC VASCULAR SURGERY)

## 2017-06-04 PROCEDURE — 86900 BLOOD TYPING SEROLOGIC ABO: CPT | Performed by: STUDENT IN AN ORGANIZED HEALTH CARE EDUCATION/TRAINING PROGRAM

## 2017-06-04 PROCEDURE — C1769 GUIDE WIRE: HCPCS | Performed by: THORACIC SURGERY (CARDIOTHORACIC VASCULAR SURGERY)

## 2017-06-04 DEVICE — STENT ESOPHAGEAL ENDOMAXX 19X120MM MAXX-1912
Type: IMPLANTABLE DEVICE | Site: ESOPHAGUS | Status: NON-FUNCTIONAL
Removed: 2017-06-27

## 2017-06-04 RX ORDER — ONDANSETRON 2 MG/ML
4 INJECTION INTRAMUSCULAR; INTRAVENOUS EVERY 30 MIN PRN
Status: DISCONTINUED | OUTPATIENT
Start: 2017-06-04 | End: 2017-06-04 | Stop reason: HOSPADM

## 2017-06-04 RX ORDER — FENTANYL CITRATE 50 UG/ML
INJECTION, SOLUTION INTRAMUSCULAR; INTRAVENOUS PRN
Status: DISCONTINUED | OUTPATIENT
Start: 2017-06-04 | End: 2017-06-04

## 2017-06-04 RX ORDER — HYDROMORPHONE HYDROCHLORIDE 1 MG/ML
.3-.5 INJECTION, SOLUTION INTRAMUSCULAR; INTRAVENOUS; SUBCUTANEOUS EVERY 10 MIN PRN
Status: DISCONTINUED | OUTPATIENT
Start: 2017-06-04 | End: 2017-06-04 | Stop reason: HOSPADM

## 2017-06-04 RX ORDER — LIDOCAINE 50 MG/G
1 PATCH TOPICAL
Status: DISCONTINUED | OUTPATIENT
Start: 2017-06-04 | End: 2017-06-29 | Stop reason: HOSPADM

## 2017-06-04 RX ORDER — SODIUM CHLORIDE, SODIUM LACTATE, POTASSIUM CHLORIDE, CALCIUM CHLORIDE 600; 310; 30; 20 MG/100ML; MG/100ML; MG/100ML; MG/100ML
INJECTION, SOLUTION INTRAVENOUS CONTINUOUS
Status: DISCONTINUED | OUTPATIENT
Start: 2017-06-04 | End: 2017-06-04 | Stop reason: HOSPADM

## 2017-06-04 RX ORDER — DEXTROSE MONOHYDRATE, SODIUM CHLORIDE, AND POTASSIUM CHLORIDE 50; 1.49; 4.5 G/1000ML; G/1000ML; G/1000ML
INJECTION, SOLUTION INTRAVENOUS CONTINUOUS
Status: DISCONTINUED | OUTPATIENT
Start: 2017-06-04 | End: 2017-06-05

## 2017-06-04 RX ORDER — FENTANYL CITRATE 50 UG/ML
25-50 INJECTION, SOLUTION INTRAMUSCULAR; INTRAVENOUS EVERY 5 MIN PRN
Status: DISCONTINUED | OUTPATIENT
Start: 2017-06-04 | End: 2017-06-04 | Stop reason: HOSPADM

## 2017-06-04 RX ORDER — LIDOCAINE HYDROCHLORIDE 20 MG/ML
INJECTION, SOLUTION INFILTRATION; PERINEURAL PRN
Status: DISCONTINUED | OUTPATIENT
Start: 2017-06-04 | End: 2017-06-04

## 2017-06-04 RX ORDER — HEPARIN SODIUM,PORCINE 10 UNIT/ML
2-5 VIAL (ML) INTRAVENOUS
Status: COMPLETED | OUTPATIENT
Start: 2017-06-04 | End: 2017-06-09

## 2017-06-04 RX ORDER — PROPOFOL 10 MG/ML
INJECTION, EMULSION INTRAVENOUS PRN
Status: DISCONTINUED | OUTPATIENT
Start: 2017-06-04 | End: 2017-06-04

## 2017-06-04 RX ORDER — SODIUM CHLORIDE, SODIUM LACTATE, POTASSIUM CHLORIDE, CALCIUM CHLORIDE 600; 310; 30; 20 MG/100ML; MG/100ML; MG/100ML; MG/100ML
INJECTION, SOLUTION INTRAVENOUS CONTINUOUS PRN
Status: DISCONTINUED | OUTPATIENT
Start: 2017-06-04 | End: 2017-06-04

## 2017-06-04 RX ORDER — MEPERIDINE HYDROCHLORIDE 25 MG/ML
12.5 INJECTION INTRAMUSCULAR; INTRAVENOUS; SUBCUTANEOUS
Status: DISCONTINUED | OUTPATIENT
Start: 2017-06-04 | End: 2017-06-04 | Stop reason: HOSPADM

## 2017-06-04 RX ORDER — HEPARIN SODIUM,PORCINE 10 UNIT/ML
5-10 VIAL (ML) INTRAVENOUS
Status: DISCONTINUED | OUTPATIENT
Start: 2017-06-04 | End: 2017-06-29 | Stop reason: HOSPADM

## 2017-06-04 RX ORDER — HEPARIN SODIUM,PORCINE 10 UNIT/ML
5-10 VIAL (ML) INTRAVENOUS EVERY 24 HOURS
Status: DISCONTINUED | OUTPATIENT
Start: 2017-06-04 | End: 2017-06-29 | Stop reason: HOSPADM

## 2017-06-04 RX ORDER — LIDOCAINE 40 MG/G
CREAM TOPICAL
Status: DISCONTINUED | OUTPATIENT
Start: 2017-06-04 | End: 2017-06-29 | Stop reason: HOSPADM

## 2017-06-04 RX ORDER — ONDANSETRON 4 MG/1
4 TABLET, ORALLY DISINTEGRATING ORAL EVERY 30 MIN PRN
Status: DISCONTINUED | OUTPATIENT
Start: 2017-06-04 | End: 2017-06-04 | Stop reason: HOSPADM

## 2017-06-04 RX ORDER — ONDANSETRON 2 MG/ML
INJECTION INTRAMUSCULAR; INTRAVENOUS PRN
Status: DISCONTINUED | OUTPATIENT
Start: 2017-06-04 | End: 2017-06-04

## 2017-06-04 RX ORDER — KETAMINE HYDROCHLORIDE 50 MG/ML
INJECTION, SOLUTION INTRAMUSCULAR; INTRAVENOUS PRN
Status: DISCONTINUED | OUTPATIENT
Start: 2017-06-04 | End: 2017-06-04

## 2017-06-04 RX ORDER — NALOXONE HYDROCHLORIDE 0.4 MG/ML
.1-.4 INJECTION, SOLUTION INTRAMUSCULAR; INTRAVENOUS; SUBCUTANEOUS
Status: DISCONTINUED | OUTPATIENT
Start: 2017-06-04 | End: 2017-06-04 | Stop reason: HOSPADM

## 2017-06-04 RX ADMIN — OXYCODONE HYDROCHLORIDE 15 MG: 5 SOLUTION ORAL at 19:04

## 2017-06-04 RX ADMIN — Medication 1 PACKET: at 13:02

## 2017-06-04 RX ADMIN — ROCURONIUM BROMIDE 10 MG: 10 INJECTION INTRAVENOUS at 08:56

## 2017-06-04 RX ADMIN — PROPOFOL 50 MG: 10 INJECTION, EMULSION INTRAVENOUS at 08:46

## 2017-06-04 RX ADMIN — PANTOPRAZOLE SODIUM 40 MG: 40 TABLET, DELAYED RELEASE ORAL at 13:00

## 2017-06-04 RX ADMIN — OXYCODONE HYDROCHLORIDE 15 MG: 5 SOLUTION ORAL at 04:18

## 2017-06-04 RX ADMIN — OXYCODONE HYDROCHLORIDE 15 MG: 5 SOLUTION ORAL at 07:17

## 2017-06-04 RX ADMIN — LIDOCAINE HYDROCHLORIDE 100 MG: 20 INJECTION, SOLUTION INFILTRATION; PERINEURAL at 08:44

## 2017-06-04 RX ADMIN — KETAMINE HYDROCHLORIDE 20 MG: 50 INJECTION, SOLUTION INTRAMUSCULAR; INTRAVENOUS at 08:44

## 2017-06-04 RX ADMIN — GABAPENTIN 300 MG: 250 SOLUTION ORAL at 19:52

## 2017-06-04 RX ADMIN — LOPERAMIDE HYDROCHLORIDE 4 MG: 1 SOLUTION ORAL at 19:51

## 2017-06-04 RX ADMIN — HYDROMORPHONE HYDROCHLORIDE 0.5 MG: 1 INJECTION, SOLUTION INTRAMUSCULAR; INTRAVENOUS; SUBCUTANEOUS at 21:09

## 2017-06-04 RX ADMIN — FLUCONAZOLE 200 MG: 2 INJECTION INTRAVENOUS at 23:03

## 2017-06-04 RX ADMIN — METOPROLOL TARTRATE 25 MG: 100 TABLET ORAL at 19:52

## 2017-06-04 RX ADMIN — FENTANYL CITRATE 25 MCG: 50 INJECTION INTRAMUSCULAR; INTRAVENOUS at 10:41

## 2017-06-04 RX ADMIN — SODIUM CHLORIDE, POTASSIUM CHLORIDE, SODIUM LACTATE AND CALCIUM CHLORIDE: 600; 310; 30; 20 INJECTION, SOLUTION INTRAVENOUS at 08:28

## 2017-06-04 RX ADMIN — TRAZODONE HYDROCHLORIDE 100 MG: 100 TABLET ORAL at 21:47

## 2017-06-04 RX ADMIN — PIPERACILLIN SODIUM,TAZOBACTAM SODIUM 3.38 G: 3; .375 INJECTION, POWDER, FOR SOLUTION INTRAVENOUS at 16:28

## 2017-06-04 RX ADMIN — GABAPENTIN 300 MG: 250 SOLUTION ORAL at 04:18

## 2017-06-04 RX ADMIN — FENTANYL CITRATE 25 MCG: 50 INJECTION INTRAMUSCULAR; INTRAVENOUS at 10:20

## 2017-06-04 RX ADMIN — PIPERACILLIN SODIUM,TAZOBACTAM SODIUM 3.38 G: 3; .375 INJECTION, POWDER, FOR SOLUTION INTRAVENOUS at 21:39

## 2017-06-04 RX ADMIN — HYDROMORPHONE HYDROCHLORIDE 0.5 MG: 1 INJECTION, SOLUTION INTRAMUSCULAR; INTRAVENOUS; SUBCUTANEOUS at 12:22

## 2017-06-04 RX ADMIN — FENTANYL CITRATE 25 MCG: 50 INJECTION INTRAMUSCULAR; INTRAVENOUS at 10:26

## 2017-06-04 RX ADMIN — ROCURONIUM BROMIDE 10 MG: 10 INJECTION INTRAVENOUS at 08:53

## 2017-06-04 RX ADMIN — HEPARIN SODIUM 5000 UNITS: 5000 INJECTION, SOLUTION INTRAVENOUS; SUBCUTANEOUS at 13:02

## 2017-06-04 RX ADMIN — FENTANYL CITRATE 25 MCG: 50 INJECTION, SOLUTION INTRAMUSCULAR; INTRAVENOUS at 10:02

## 2017-06-04 RX ADMIN — ACETAMINOPHEN 975 MG: 160 SUSPENSION ORAL at 04:18

## 2017-06-04 RX ADMIN — Medication 10 ML: at 19:51

## 2017-06-04 RX ADMIN — ONDANSETRON 4 MG: 2 INJECTION INTRAMUSCULAR; INTRAVENOUS at 09:12

## 2017-06-04 RX ADMIN — FENTANYL CITRATE 25 MCG: 50 INJECTION, SOLUTION INTRAMUSCULAR; INTRAVENOUS at 09:28

## 2017-06-04 RX ADMIN — Medication 10 ML: at 15:58

## 2017-06-04 RX ADMIN — Medication 6 MG: at 15:59

## 2017-06-04 RX ADMIN — FENTANYL CITRATE 50 MCG: 50 INJECTION, SOLUTION INTRAMUSCULAR; INTRAVENOUS at 08:44

## 2017-06-04 RX ADMIN — ACETAMINOPHEN 975 MG: 160 SUSPENSION ORAL at 19:05

## 2017-06-04 RX ADMIN — POTASSIUM CHLORIDE, DEXTROSE MONOHYDRATE AND SODIUM CHLORIDE: 150; 5; 450 INJECTION, SOLUTION INTRAVENOUS at 01:23

## 2017-06-04 RX ADMIN — OXYCODONE HYDROCHLORIDE 15 MG: 5 SOLUTION ORAL at 12:59

## 2017-06-04 RX ADMIN — HEPARIN SODIUM 5000 UNITS: 5000 INJECTION, SOLUTION INTRAVENOUS; SUBCUTANEOUS at 21:44

## 2017-06-04 RX ADMIN — OXYCODONE HYDROCHLORIDE 15 MG: 5 SOLUTION ORAL at 21:47

## 2017-06-04 RX ADMIN — LIDOCAINE 1 PATCH: 50 PATCH TOPICAL at 13:00

## 2017-06-04 RX ADMIN — OXYCODONE HYDROCHLORIDE 15 MG: 5 SOLUTION ORAL at 15:58

## 2017-06-04 RX ADMIN — Medication 6 MG: at 04:18

## 2017-06-04 RX ADMIN — ROCURONIUM BROMIDE 10 MG: 10 INJECTION INTRAVENOUS at 08:46

## 2017-06-04 RX ADMIN — METOPROLOL TARTRATE 25 MG: 100 TABLET ORAL at 07:17

## 2017-06-04 RX ADMIN — SUCCINYLCHOLINE CHLORIDE 80 MG: 20 INJECTION, SOLUTION INTRAMUSCULAR; INTRAVENOUS at 08:47

## 2017-06-04 RX ADMIN — POTASSIUM CHLORIDE, DEXTROSE MONOHYDRATE AND SODIUM CHLORIDE: 150; 5; 450 INJECTION, SOLUTION INTRAVENOUS at 10:22

## 2017-06-04 RX ADMIN — FENTANYL CITRATE 25 MCG: 50 INJECTION INTRAMUSCULAR; INTRAVENOUS at 10:33

## 2017-06-04 RX ADMIN — HYDROMORPHONE HYDROCHLORIDE 0.5 MG: 1 INJECTION, SOLUTION INTRAMUSCULAR; INTRAVENOUS; SUBCUTANEOUS at 10:58

## 2017-06-04 RX ADMIN — LIDOCAINE HYDROCHLORIDE 3 ML: 10 INJECTION, SOLUTION INFILTRATION; PERINEURAL at 16:55

## 2017-06-04 RX ADMIN — POTASSIUM CHLORIDE, DEXTROSE MONOHYDRATE AND SODIUM CHLORIDE: 150; 5; 450 INJECTION, SOLUTION INTRAVENOUS at 20:30

## 2017-06-04 RX ADMIN — Medication 6 MG: at 21:47

## 2017-06-04 RX ADMIN — LOPERAMIDE HYDROCHLORIDE 4 MG: 1 SOLUTION ORAL at 15:59

## 2017-06-04 RX ADMIN — OXYCODONE HYDROCHLORIDE 15 MG: 5 SOLUTION ORAL at 01:22

## 2017-06-04 RX ADMIN — HEPARIN SODIUM 5000 UNITS: 5000 INJECTION, SOLUTION INTRAVENOUS; SUBCUTANEOUS at 06:27

## 2017-06-04 RX ADMIN — FENTANYL CITRATE 25 MCG: 50 INJECTION, SOLUTION INTRAMUSCULAR; INTRAVENOUS at 09:45

## 2017-06-04 RX ADMIN — SUGAMMADEX 100 MG: 100 INJECTION, SOLUTION INTRAVENOUS at 09:43

## 2017-06-04 RX ADMIN — PIPERACILLIN SODIUM,TAZOBACTAM SODIUM 3.38 G: 3; .375 INJECTION, POWDER, FOR SOLUTION INTRAVENOUS at 04:18

## 2017-06-04 ASSESSMENT — ENCOUNTER SYMPTOMS: DYSRHYTHMIAS: 1

## 2017-06-04 NOTE — PLAN OF CARE
Problem: Goal Outcome Summary  Goal: Goal Outcome Summary  Edema 7B: Pt with significant reductions in B LE measurements with GCB wear x 24 hours. Pt with reductions up to 5 cm per landmark and with mild 1+ pitting remaining to the knee crease. Trialling comperm size E from MTPs to knee crease for gentle edema management. OK for pt to wear comperm size E leg compression 23/24 hours a day. Nursing can remove compression for skin checks and for 1 hour a day. OK to clean comperm in sink and hang to dry PRN -- please do not throw away if dirty. Try to keep compression smooth and avoid any areas of bunching or rolling. Please remove any compression if it causes numbness, tingling, pain, becomes soiled, or if pt becomes unable to verbalize pain. Will follow up with patient tomorrow.

## 2017-06-04 NOTE — ANESTHESIA PREPROCEDURE EVALUATION
Anesthesia Evaluation     . Pt has had prior anesthetic. Type: General and MAC           ROS/MED HX    ENT/Pulmonary:     (+), recent URI resolved . .    Neurologic:     (+)Multiple Sclerosis     Cardiovascular:     (+) ----. Taking blood thinners : . . . :. dysrhythmias a-fib, . Previous cardiac testing Echodate:results:Interpretation Summary  Technically difficult study.  Global and regional left ventricular function is hyperdynamic with an LVEF  >70%.  The right ventricular function is hyperdynamic.  Mild pulmonary hypertension is present.  The inferior vena cava is normal. The estimated mean right atrial pressure is  <3 mmHg.  No pericardial effusion is present.  Previous study not available for comparison.date: results:ECG reviewed date: results:Sinus tachycardia () date: results:          METS/Exercise Tolerance:     Hematologic:     (+) Anemia, -      Musculoskeletal:  - neg musculoskeletal ROS       GI/Hepatic: Comment: S/p recent esophagectomy with gastric pullup. She has an anastomotic leak and mediastinal abscess for which she is on abx.    (+) GERD Other, hiatal hernia,       Renal/Genitourinary:  - ROS Renal section negative       Endo:  - neg endo ROS       Psychiatric:  - neg psychiatric ROS       Infectious Disease: Comment: See GI  - neg infectious disease ROS       Malignancy:   (+) Malignancy History of Breast          Other: Comment: Fibromyalgia    (+) H/O Chronic Pain,                 Procedure: Procedure(s):  COMBINED IRRIGATION AND DEBRIDEMENT CHEST WASHOUT - Wound Class: I-Clean   - Wound Class: II-Clean Contaminated    HPI: Minerva Blanco is a 72 year old female with PMHx as below presents for above procedure 2/2 esophagectomy and chronic anastomotic leak.    PMHx/PSHx:  Past Medical History:   Diagnosis Date     Atrial fibrillation (H)      Breast cancer (H) 2007    right side - lumpectomy, chemo, and local radiation     Chronic infection     infection/wound for two years after  esoph surgery     Esophageal perforation      Fibromyalgia      GERD (gastroesophageal reflux disease)      Hiatal hernia      History of blood transfusion      IBS (irritable bowel syndrome)      Meniere's disease     deaf left ear     MS (multiple sclerosis) (H)      Noninfectious ileitis      Other chronic pain        Past Surgical History:   Procedure Laterality Date     APPENDECTOMY       BACK SURGERY  5/1/2015    lumbar fusion     BRONCHOSCOPY FLEXIBLE N/A 4/17/2017    Procedure: BRONCHOSCOPY FLEXIBLE;;  Surgeon: Jens Wise MD;  [facility]     C GASTROSTOMY/JEJUN TUBE      J tube for feedings     CLOSE SPIT FISTULA N/A 4/17/2017    Procedure: CLOSE SPIT FISTULA;;  Surgeon: Jens Wise MD;  [facility]     COMBINED ESOPHAGOSCOPY, GASTROSCOPY, DUODENOSCOPY (EGD), REMOVE ESOPHAGEAL STENT N/A 8/26/2016    Procedure: COMBINED ESOPHAGOSCOPY, GASTROSCOPY, DUODENOSCOPY (EGD), REMOVE ESOPHAGEAL STENT;  Surgeon: Jens Wise MD;  [facility]     CREATE SPIT FISTULA N/A 1/9/2017    Procedure: CREATE SPIT FISTULA;  Surgeon: Jens Wise MD;  [facility]     ESOPHAGECTOMY N/A 1/9/2017    Procedure: ESOPHAGECTOMY;  Surgeon: Jens Wise MD;  [facility]     ESOPHAGOSCOPY, GASTROSCOPY, DUODENOSCOPY (EGD), COMBINED N/A 4/18/2016    Procedure: COMBINED ESOPHAGOSCOPY, GASTROSCOPY, DUODENOSCOPY (EGD);  Surgeon: Alexis Barraza MD;  [facility]     ESOPHAGOSCOPY, GASTROSCOPY, DUODENOSCOPY (EGD), COMBINED N/A 4/25/2016    Procedure: COMBINED ESOPHAGOSCOPY, GASTROSCOPY, DUODENOSCOPY (EGD);  Surgeon: Alexis Barraza MD;  [facility]     ESOPHAGOSCOPY, GASTROSCOPY, DUODENOSCOPY (EGD), COMBINED N/A 5/4/2016    Procedure: COMBINED ESOPHAGOSCOPY, GASTROSCOPY, DUODENOSCOPY (EGD);  Surgeon: Alexis Barraza MD;  [facility]     ESOPHAGOSCOPY, GASTROSCOPY, DUODENOSCOPY (EGD), COMBINED N/A 5/18/2016     Procedure: COMBINED ESOPHAGOSCOPY, GASTROSCOPY, DUODENOSCOPY (EGD);  Surgeon: Alexis Barraza MD;  [facility]     ESOPHAGOSCOPY, GASTROSCOPY, DUODENOSCOPY (EGD), COMBINED N/A 6/22/2016    Procedure: COMBINED ESOPHAGOSCOPY, GASTROSCOPY, DUODENOSCOPY (EGD);  Surgeon: Alexsi Barraza MD;  [facility]     ESOPHAGOSCOPY, GASTROSCOPY, DUODENOSCOPY (EGD), COMBINED N/A 7/12/2016    Procedure: COMBINED ESOPHAGOSCOPY, GASTROSCOPY, DUODENOSCOPY (EGD);  Surgeon: Jens Wise MD;  [facility]     ESOPHAGOSCOPY, GASTROSCOPY, DUODENOSCOPY (EGD), COMBINED N/A 4/21/2017    Procedure: COMBINED ESOPHAGOSCOPY, GASTROSCOPY, DUODENOSCOPY (EGD);  Esophagogastroduodenoscopy, Pharyngostomy Tube Placement ;  Surgeon: Jens Wise MD;  [facility]     ESOPHAGOSCOPY, GASTROSCOPY, DUODENOSCOPY (EGD), COMBINED N/A 5/19/2017    Procedure: COMBINED ESOPHAGOSCOPY, GASTROSCOPY, DUODENOSCOPY (EGD);  Upper Endoscopy, Irrigation and Debridement of Chest Wound ;  Surgeon: Nalini Barreto MD;  [facility]     ESOPHAGOSCOPY, GASTROSCOPY, DUODENOSCOPY (EGD), COMBINED N/A 5/23/2017    Procedure: COMBINED ESOPHAGOSCOPY, GASTROSCOPY, DUODENOSCOPY (EGD);;  Surgeon: Nalini Barreto MD;  [facility]     ESOPHAGOSCOPY, GASTROSCOPY, DUODENOSCOPY (EGD), DILATATION, COMBINED N/A 6/29/2016    Procedure: COMBINED ESOPHAGOSCOPY, GASTROSCOPY, DUODENOSCOPY (EGD), DILATATION;  Surgeon: Jens Wise MD;  [facility]     EXPLORE NECK N/A 5/19/2017    Procedure: EXPLORE NECK;;  Surgeon: Nalini Barreto MD;  [facility]     HC UGI ENDOSCOPY W TRANSENDOSCOPIC STENT PLACEMENT N/A 7/12/2016    Procedure: COMBINED ESOPHAGOSCOPY, GASTROSCOPY, DUODENOSCOPY (EGD), PLACE TRANSENDOSCOPIC ESOPHAGEAL STENT;  Surgeon: Jens Wise MD;  [facility]      UGI ENDOSCOPY W TRANSENDOSCOPIC STENT PLACEMENT N/A 7/22/2016    Procedure: COMBINED ESOPHAGOSCOPY, GASTROSCOPY, DUODENOSCOPY  (EGD), PLACE TRANSENDOSCOPIC ESOPHAGEAL STENT;  Surgeon: Jens Wise MD;  [facility]     INCISION AND DRAINAGE CHEST WASHOUT, COMBINED N/A 5/27/2017    Procedure: COMBINED INCISION AND DRAINAGE CHEST WASHOUT;  Chest washout;  Surgeon: Nalini Barreto MD;  [facility]     INCISION AND DRAINAGE CHEST WASHOUT, COMBINED N/A 5/28/2017    Procedure: COMBINED INCISION AND DRAINAGE CHEST WASHOUT;  Chest washout;  Surgeon: Nalini Barreto MD;  [facility]     IRRIGATION AND DEBRIDEMENT CHEST WASHOUT, COMBINED N/A 6/29/2016    Procedure: COMBINED IRRIGATION AND DEBRIDEMENT CHEST WASHOUT;  Surgeon: Jens Wise MD;  [facility]     IRRIGATION AND DEBRIDEMENT CHEST WASHOUT, COMBINED N/A 5/23/2017    Procedure: COMBINED IRRIGATION AND DEBRIDEMENT CHEST WASHOUT;  COMBINED IRRIGATION AND DEBRIDEMENT CHEST WASHOUT,COMBINED ESOPHAGOSCOPY, GASTROSCOPY, DUODENOSCOPY (EGD) ;  Surgeon: Nalini Barreto MD;  [facility]     IRRIGATION AND DEBRIDEMENT CHEST WASHOUT, COMBINED N/A 5/24/2017    Procedure: COMBINED IRRIGATION AND DEBRIDEMENT CHEST WASHOUT;  Chest wound Washout and Dressing Change;  Surgeon: Nalini Barreto MD;  [facility]     IRRIGATION AND DEBRIDEMENT CHEST WASHOUT, COMBINED N/A 5/25/2017    Procedure: COMBINED IRRIGATION AND DEBRIDEMENT CHEST WASHOUT;  Irrigation and Debridement Chest Washout and dressing change;  Surgeon: Nlaini Barreto MD;  [facility]     IRRIGATION AND DEBRIDEMENT CHEST WASHOUT, COMBINED N/A 5/26/2017    Procedure: COMBINED IRRIGATION AND DEBRIDEMENT CHEST WASHOUT;  Irrigation and Debridement Chest Washout with  dressing change;  Surgeon: Nalini Barreto MD;  [facility]     IRRIGATION AND DEBRIDEMENT CHEST WASHOUT, COMBINED N/A 5/30/2017    Procedure: COMBINED IRRIGATION AND DEBRIDEMENT CHEST WASHOUT;  Irrigation and Debridement Chest Washout , PICC line dressing change;  Surgeon: Nalini Barreto MD;  [facility]     IRRIGATION AND  DEBRIDEMENT CHEST WASHOUT, COMBINED N/A 5/31/2017    Procedure: COMBINED IRRIGATION AND DEBRIDEMENT CHEST WASHOUT;  Irrigation and Debridement Chest Washout ;  Surgeon: Nalini Barreto MD;  [facility]     IRRIGATION AND DEBRIDEMENT CHEST WASHOUT, COMBINED N/A 6/1/2017    Procedure: COMBINED IRRIGATION AND DEBRIDEMENT CHEST WASHOUT;  Chest Wound Irrigation And Debridement with dressing change ;  Surgeon: Nalini Barreto MD;  [facility]     LAPAROSCOPIC ASSISTED INSERTION TUBE JEJUNOSTOMY N/A 4/17/2017    Procedure: LAPAROSCOPIC ASSISTED INSERTION TUBE JEJUNOSTOMY;;  Surgeon: Jens Wise MD;  [facility]     LAPAROSCOPY DIAGNOSTIC (GENERAL) N/A 1/9/2017    Procedure: LAPAROSCOPY DIAGNOSTIC (GENERAL);  Surgeon: Jens Wise MD;  [facility]     LAPAROSCOPY DIAGNOSTIC (GENERAL) N/A 4/17/2017    Procedure: LAPAROSCOPY DIAGNOSTIC (GENERAL);  Laparoscopic , Neck Dissection, Spit Fistula Takedown, Laparoscopic Jejunostomy Tube and Pharyngostomy Tube, Gastric Pull up, Upper Endoscopy(EGD)  , Flexible Bronchoscopy ,Sternotomy ;  Surgeon: Jens Wise MD;  [facility]     LUMPECTOMY BREAST Right 2007     NERVE BLOCK PERIPHERAL N/A 8/30/2016    Procedure: NERVE BLOCK PERIPHERAL;  Surgeon: GENERIC ANESTHESIA PROVIDER;  [facility]     NISSEN FUNDOPLICATION  1/2016     PHARYNGOSTOMY N/A 4/18/2016    Procedure: PHARYNGOSTOMY;  Surgeon: Alexis Barraza MD;  [facility]     PHARYNGOSTOMY N/A 4/25/2016    Procedure: PHARYNGOSTOMY;  Surgeon: Alexis Barraza MD;  [facility]     PHARYNGOSTOMY N/A 5/4/2016    Procedure: PHARYNGOSTOMY;  Surgeon: Alexis Barraza MD;  [facility]     PHARYNGOSTOMY N/A 6/22/2016    Procedure: PHARYNGOSTOMY;  Surgeon: Alexis Barraza MD;  [facility]     PHARYNGOSTOMY N/A 6/29/2016    Procedure: PHARYNGOSTOMY;  Surgeon: Jens Wise MD;  [facility]     PHARYNGOSTOMY  "N/A 4/21/2017    Procedure: PHARYNGOSTOMY;;  Surgeon: Jens Wise MD;  [facility]     PHARYNGOSTOMY Left 5/31/2017    Procedure: PHARYNGOSTOMY;;  Surgeon: Nalini Barreto MD;  [facility]     PICC INSERTION Left 8/25/2016    5fr DL BioFlo PICC, 42cm (3cm external) in the L medial brachial vein w/ tip in the SVC RA junction.     PICC INSERTION - Rewire Right 05/31/2017    5fr DL BioFlo PICC, 40cm (6cm external) in the R medial brachial vein w/ tip in the SVC RA junction.     THORACOTOMY Right 1998    lung infection - \"hard crust formed on lung\"     THORACOTOMY Left 1/9/2017    Procedure: THORACOTOMY;  Surgeon: Jens Wise MD;  [facility]     WRIST SURGERY           Current Facility-Administered Medications on File Prior to Encounter:  bupivacaine 0.25 % - EPINEPHrine 1:200,000 injection     Current Outpatient Prescriptions on File Prior to Encounter:  oxyCODONE (ROXICODONE) 10 MG IR tablet Take 1 to 1.5 tablet every 3-4 hours PRN pain   diphenoxylate-atropine (LOMOTIL) 0.5-0.005 mg/mL liquid Take 5 mLs by mouth 4 times daily as needed for diarrhea   fiber modular (NUTRISOURCE FIBER) packet 1 packet by Per Feeding Tube route 3 times daily (with meals)   metoprolol (LOPRESSOR) 10 mg/mL SUSP 2.5 mLs (25 mg) by Per J Tube route 2 times daily   opium tincture 10 mg/mL (1%) liquid Take 0.6 mLs (6 mg) by mouth every 6 hours   ferrous sulfate 300 (60 FE) MG/5ML syrup 5 mLs (300 mg) by Per J Tube route daily   gabapentin (NEURONTIN) 250 MG/5ML solution Take 6 mLs (300 mg) by mouth every 8 hours   traZODone (DESYREL) 100 MG tablet 0.5 tablets (50 mg) by Per G Tube route At Bedtime (Patient taking differently: 100 mg by Per G Tube route At Bedtime )   ranitidine (ZANTAC) 150 MG/10ML syrup Take 10 mLs (150 mg) by mouth 2 times daily   warfarin (COUMADIN) 2.5 MG tablet Take 1 tablet (2.5 mg) by mouth daily (Patient not taking: Reported on 5/18/2017)   order for DME Equipment being " ordered: [facility] Suction Machine-IntermittentSuction Canisters(2)Suction Canister Holders(2)Suction Connect Tube(2)5 in 1 Connector(2)Bacteria Filter(2)Yaunkauer Suction(2)Treatment Diagnosis: Esophogeal Perforation, s/p esophagectomy   order for DME Equipment being ordered: Suction PumpSuction Canister(2)Suction Tubing(2)Bacteria Filter(2)[employer]auer(4)Red Rubber Catheter(2)Treatment Diagnosis: Esophogeal Perforation, s/p esophagectomy   DiphenhydrAMINE HCl (BENADRYL PO) Take 25 mg by mouth nightly as needed   cholestyramine (QUESTRAN) 4 G Packet Take 1 packet by mouth daily   multivitamins with minerals (CERTAVITE/CEROVITE) LIQD liquid Take 15 mLs by mouth daily   loperamide (IMODIUM) 1 MG/5ML liquid Take 20 mLs (4 mg) by mouth 4 times daily as needed for diarrhea (Patient taking differently: Take 4 mg by mouth 2 times daily )   Lactobacillus Rhamnosus, GG, (CULTURELLE PO) Take 1 tablet by mouth 2 times daily        Social Hx:   Social History   Substance Use Topics     Smoking status: Former Smoker     Packs/day: 1.00     Years: 15.00     Types: Cigarettes     Quit date: 1/1/1998     Smokeless tobacco: Not on file     Alcohol use No       Allergies:   Allergies   Allergen Reactions     Amoxicillin Diarrhea     Ativan [Lorazepam] Other (See Comments)     Hallucinations     Hydromorphone Itching     4/12/17 - patient open to using this as she tolerated Hydromorphone PCA during hospitalization in January 2017.      Morphine Itching         NPO Status: Per ASA Guidelines    Labs:    Blood Bank:  Lab Results   Component Value Date    ABO A 05/31/2017    RH  Neg 05/31/2017    AS Neg 05/31/2017     BMP:  Recent Labs   Lab Test  06/02/17   0829   NA  139   POTASSIUM  4.8   CHLORIDE  107   CO2  23   BUN  11   CR  0.40*   GLC  89   ANNABELLE  8.0*     CBC:   Recent Labs   Lab Test  06/03/17   0756   05/29/17   0133   WBC   --    --   7.7   RBC   --    --   2.77*   HGB   --    --   7.7*   HCT   --    --   25.5*   MCV   --    --   92    MCH   --    --   27.8   MCHC   --    --   30.2*   RDW   --    --   17.5*   PLT  412   < >  490*    < > = values in this interval not displayed.     Coags:  Recent Labs   Lab Test  05/29/17   0623  05/27/17   0948   05/19/17   1650   INR  1.51*  1.43*   < >  1.87*   PTT   --   65*   --   54*   FIBR   --    --    --   497*    < > = values in this interval not displayed.                      Anesthesia Plan      History & Physical Review  History and physical reviewed and following examination; no interval change.    ASA Status:  3 .        Plan for General and ETT with Propofol induction. Maintenance will be Balanced.    PONV prophylaxis:  Ondansetron (or other 5HT-3)  Additional equipment: 2nd IV, Videolaryngoscope and Arterial Line      Postoperative Care  Postoperative pain management:  IV analgesics and Multi-modal analgesia.  Plan for postoperative opioid use.    Consents  Anesthetic plan, risks, benefits and alternatives discussed with:  Patient.  Use of blood products discussed: Yes.   Use of blood products discussed with Patient.  Consented to blood products.  .        Marilyn Tiwari MD  CA-2/PGY-3  6/4/2017  6:19 AM

## 2017-06-04 NOTE — PLAN OF CARE
Problem: Goal Outcome Summary  Goal: Goal Outcome Summary  Vitals:     06/02/17 2208 06/03/17 0730 06/03/17 1230 06/03/17 1523   BP: 129/86 139/65 140/67 146/59   BP Location: Left arm Left arm Left arm     Cuff Size:           Pulse:           Resp: 16 16 16 16   Temp: 98.8  F (37.1  C) 98.1  F (36.7  C) 98.2  F (36.8  C) 96.8  F (36  C)   TempSrc: Oral Oral Oral Oral   SpO2: 95% 97% 95% 92%   Weight:           Height:             Pt is alert and oriented. Having loose stools.Pharyngostomy tube to LIS .Chest tube drain with No output.Tube feeding is adjusting by the pt running 36 ml/hr.IVF running 75 ml/hr. Chest tube dressing intact.Pt PICC line pulled out.Vascular look at the PICC .X -ray done.Now PICC line is back to MIDLINE.TPN hold.MD paged for rewire order for tomorrow.OR tomorrow for EGD ,possible stent placement and dressing change.  Will monitor.

## 2017-06-04 NOTE — OP NOTE
DATE OF PROCEDURE:  06/04/2017      PREOPERATIVE DIAGNOSIS:  Anastomotic leak post-retrosternal gastric pull-up with esophageal reconstruction.      PROCEDURE PERFORMED:   1.  Esophagogastroscopy with esophageal stent placement (19 mm x 120 mm).   2.  Fluoroscopy with interpretation of images.   3.  Irrigation and drainage and debridement of anterior chest wound.   4.  Replacement of pharyngostomy tube.      SURGEON:  Jens Wise MD (present and participated in the entire procedure).      RESIDENT SURGEON:  Viki Carmen MD      ANESTHESIA:  General.      ESTIMATED BLOOD LOSS:  3 mL.      COMPLICATIONS:  None immediate.      FINDINGS:  On close examination of the anastomosis, which was at 26 cm from the teeth, at least two-thirds of the circumference of the anastomosis seem intact.  For this reason, I thought it was worth placing a stent to control the fistula and allow it to heal.      DESCRIPTION OF PROCEDURE:  With the patient in supine position under general anesthesia, I performed an esophagogastroscopy with the above-mentioned findings.  I then pulled the pharyngostomy tube out and passed a wire and under fluoroscopic guidance we placed a 19 x 120 mm fully covered esophageal stent and the anastomosis which was strictured caused an indentation on the stent at about half way.  We were cautious to deploy the stent in such a way that the distal end would be at least likely to rub on the conduit.  After verifying endoscopically and fluoroscopically that the stent was properly positioned, we then placed the gastroscope through again, past the wire and then over the wire, replaced an 18-Telugu pharyngostomy tube to the same pharyngostomy site over the wire and positioned it so that it was above the level of the pylorus.  We then secured that at the skin.      Finally, we performed an irrigation and drainage of the anterior chest wound and replaced the existing 24-Telugu chest tube for a new one.      The  patient tolerated the procedure well.         NIRALI LOPEZ MD             D: 2017 09:53   T: 2017 17:22   MT: ALICIA      Name:     FAVIAN MALONE   MRN:      [id number]        Account:        [id number]   :      1945           Procedure Date: 2017      Document: [id number]       cc: [facility] Surgery Billing

## 2017-06-04 NOTE — PLAN OF CARE
Problem: Goal Outcome Summary  Goal: Goal Outcome Summary  Outcome: No Change  /61 (BP Location: Left arm)  Pulse 72  Temp 96.1  F (35.6  C) (Oral)  Resp 16  Ht 1.524 m (5')  Wt 48.7 kg (107 lb 6.4 oz)  SpO2 95%  Breastfeeding? No  BMI 20.98 kg/m2   Pt today had combined I&D and chest wound washout, incision covered- dressing C/D/I. NG to LIWS with scant output. J-tube clamped- pt declined to restart tube feedings until later.pt c/o neck and mid sternal pain- dilaudid, Lidocaine patch and Oxycodone given with relief.  Chest tube to gravity drainage- scant output. Midline IV intact- IV fluids infusing per order, TPN on hold- Vascular access to place new PICC line tomorrow. Pt up to commode- voiding adequate amounts. Cont with plan of care.

## 2017-06-04 NOTE — ANESTHESIA POSTPROCEDURE EVALUATION
Patient: Minerva Blanco    Procedure(s):  COMBINED IRRIGATION AND DEBRIDEMENT CHEST WASHOUT, Esophagoscopy, Gastroscopy, Duodenoscopy (EGD) place transendoscopic esophageal stent,   Pharyngostomy and chest wall tube exchange  C-Arm - Wound Class: I-Clean   - Wound Class: II-Clean Contaminated    Diagnosis:Chest wound  Diagnosis Additional Information: No value filed.    Anesthesia Type:  General, ETT, RSI    Note:  Anesthesia Post Evaluation    Patient location during evaluation: PACU  Patient participation: Able to fully participate in evaluation  Level of consciousness: awake and alert  Pain management: adequate  Airway patency: patent  Cardiovascular status: acceptable  Respiratory status: acceptable  Hydration status: acceptable  PONV: none     Anesthetic complications: None          Last vitals:  Vitals:    06/04/17 0700 06/04/17 0800 06/04/17 1000   BP: 141/78 139/80 (!) 178/93   Pulse:      Resp: 16 16 9   Temp: 36.4  C (97.5  F)  36.4  C (97.5  F)   SpO2: 100%  100%         Electronically Signed By: Femi Zapata MD  June 4, 2017  10:21 AM

## 2017-06-04 NOTE — BRIEF OP NOTE
[facility], [place]    Brief Operative Note    Pre-operative diagnosis: Chest wound  Post-operative diagnosis * No post-op diagnosis entered *  Procedure: Procedure(s):  COMBINED IRRIGATION AND DEBRIDEMENT CHEST WASHOUT - Wound Class: I-Clean   - Wound Class: II-Clean Contaminated  Surgeon: Surgeon(s) and Role:     * Jens Wise MD - Primary     * Viki Carmen MD - Resident - Assisting  Anesthesia: General   Estimated blood loss: Minimal  Drains: Replaced mediastinal chest tube, 24 f. Replaced pharyngostomy tube.   Specimens: * No specimens in log *  Findings:   Side wall perforation and stenosis, stented across 19x120 endomaxx stent.  Complications: None.  Implants: None.      Side wall esophageal perf was stented over  Pharyngostomy tube and CT (in abscess cavity) replaced  Return to klein. Continue current care with TPN/TF  Pharyngostomy to LIWS  CT to gravity  Continue antibiotics  Will evaluate tomorrow in OR, hopefully will move to bedside changes with stent placed

## 2017-06-04 NOTE — PROGRESS NOTES
Called to assess Picc line,  Picc line out approximately 11cm. CXR completed, catheter tip mid clavicular, ROMAINE Woods notified, that picc is now a midline. Labels placed on caps.  Will rewire in am after receiving orders. Positive blood return, flushes easily.

## 2017-06-04 NOTE — PLAN OF CARE
Problem: Goal Outcome Summary  Goal: Goal Outcome Summary  OT 7B: Cancel - pt off unit at procedure this AM, schedule did not allow for check back this afternoon. Will reschedule for tomorrow.

## 2017-06-04 NOTE — PLAN OF CARE
Problem: Goal Outcome Summary  Goal: Goal Outcome Summary  Vital signs:  Temp: 96.1  F (35.6  C) Temp src: Oral BP: 107/49   Heart Rate: 77 Resp: 16 SpO2: 99 % O2 Device: None (Room air)   VSS. Chest dressing C/D/I. Chest tube to gravity with no output. Pharyngostomy to LIS with brown output. PICC assessed by vascular on evenings, changed to midline, ordered to be rewired this AM. TPN held overnight. TF's via J-tube at 30 mL/hr. Scheduled oxycodone for pain control. Up to the commode independently, voiding adequate amounts. Sleeping throughout the night.

## 2017-06-04 NOTE — PROGRESS NOTES
POST OP CHECK    Pt seen at bedside resting.  Does complain of pain around the sutures.  Otherwise no acute complaints.        /61 (BP Location: Left arm)  Pulse 72  Temp 96.1  F (35.6  C) (Oral)  Resp 16  Ht 1.524 m (5')  Wt 48.7 kg (107 lb 6.4 oz)  SpO2 95%  Breastfeeding? No  BMI 20.98 kg/m2  A&Ox3, NAD  Breathing non-labored. Dressing intact   RRR  Soft, NDNT  Distal extremities warm.       Pharyngostomy tube in place    A/P: Minerva Blanco is a 72 year old female POD# 0 s/p combined I&D and chest washout. Currently doing well   Continue current care with TPN/TF  Pharyngostomy to LIWS  CT to gravity  Continue antibiotics  Will add lidoderm patch around sutured skin     Emily Hampton MD  PGY-1, General Surgery  [phone]

## 2017-06-04 NOTE — OP NOTE
DATE OF PROCEDURE:  2017.      PREOPERATIVE DIAGNOSIS:  Esophageal leak status post esophagectomy.      POSTOPERATIVE DIAGNOSIS:  Esophageal leak status post esophagectomy.      OPERATING SURGEON:  Conchis Marrero MD      ASSISTING SURGEON:  Viki Carmen MD      PROCEDURE PERFORMED:  Chest wound washout and dressing change.      DETAILS OF PROCEDURE:  After obtaining informed consent, Favian Malone was brought to the operating room and laid supine on the operating table.  After induction of general anesthesia and introduction of an endotracheal tube.  The procedure was done under monitored anesthesia care.  After adequate deep sedation, the packing was removed.  The wound was washed out and a new packing was inserted.  The patient tolerated the procedure well.         CONCHIS MARRERO MD             D: 2017 15:53   T: 2017 10:56   MT: TW      Name:     FAVIAN MALONE   MRN:      [id number]        Account:        [id number]   :      1945           Procedure Date: 2017      Document: [id number]

## 2017-06-04 NOTE — ANESTHESIA CARE TRANSFER NOTE
Patient: Minerva Blanco    Procedure(s):  COMBINED IRRIGATION AND DEBRIDEMENT CHEST WASHOUT, Esophagoscopy, Gastroscopy, Duodenoscopy (EGD) place transendoscopic esophageal stent,   Pharyngostomy and chest wall tube exchange  C-Arm - Wound Class: I-Clean   - Wound Class: II-Clean Contaminated    Diagnosis: Chest wound  Diagnosis Additional Information: No value filed.    Anesthesia Type:   General, ETT, RSI     Note:  Airway :Face Mask  Patient transferred to:PACU  Comments: Extubated in OR 14, transferred to PACU, hemodynamically stable. Upon arrival to PACU, pain is moderately well controlled, no nausea. SpO2 100% on 6L O2 via facemask.     Maxi Jimenez        Vitals: (Last set prior to Anesthesia Care Transfer)    CRNA VITALS  6/4/2017 0927 - 6/4/2017 1027      6/4/2017             Pulse: 82    SpO2: 100 %                Electronically Signed By: Maxi Jimenez MD  June 4, 2017  11:45 AM

## 2017-06-05 ENCOUNTER — ANESTHESIA EVENT (OUTPATIENT)
Dept: SURGERY | Facility: CLINIC | Age: 72
DRG: 856 | End: 2017-06-05
Payer: MEDICARE

## 2017-06-05 ENCOUNTER — APPOINTMENT (OUTPATIENT)
Dept: OCCUPATIONAL THERAPY | Facility: CLINIC | Age: 72
DRG: 856 | End: 2017-06-05
Attending: STUDENT IN AN ORGANIZED HEALTH CARE EDUCATION/TRAINING PROGRAM
Payer: MEDICARE

## 2017-06-05 ENCOUNTER — APPOINTMENT (OUTPATIENT)
Dept: GENERAL RADIOLOGY | Facility: CLINIC | Age: 72
DRG: 856 | End: 2017-06-05
Attending: PHYSICIAN ASSISTANT
Payer: MEDICARE

## 2017-06-05 ENCOUNTER — APPOINTMENT (OUTPATIENT)
Dept: ULTRASOUND IMAGING | Facility: CLINIC | Age: 72
DRG: 856 | End: 2017-06-05
Attending: STUDENT IN AN ORGANIZED HEALTH CARE EDUCATION/TRAINING PROGRAM
Payer: MEDICARE

## 2017-06-05 ENCOUNTER — ANESTHESIA (OUTPATIENT)
Dept: SURGERY | Facility: CLINIC | Age: 72
DRG: 856 | End: 2017-06-05
Payer: MEDICARE

## 2017-06-05 LAB
ALBUMIN SERPL-MCNC: 2 G/DL (ref 3.4–5)
ALP SERPL-CCNC: 324 U/L (ref 40–150)
ALT SERPL W P-5'-P-CCNC: 25 U/L (ref 0–50)
ANION GAP SERPL CALCULATED.3IONS-SCNC: 9 MMOL/L (ref 3–14)
AST SERPL W P-5'-P-CCNC: 31 U/L (ref 0–45)
BILIRUB SERPL-MCNC: 0.5 MG/DL (ref 0.2–1.3)
BUN SERPL-MCNC: 8 MG/DL (ref 7–30)
CALCIUM SERPL-MCNC: 8.5 MG/DL (ref 8.5–10.1)
CHLORIDE SERPL-SCNC: 102 MMOL/L (ref 94–109)
CO2 SERPL-SCNC: 27 MMOL/L (ref 20–32)
CREAT SERPL-MCNC: 0.43 MG/DL (ref 0.52–1.04)
ERYTHROCYTE [DISTWIDTH] IN BLOOD BY AUTOMATED COUNT: 20.5 % (ref 10–15)
GFR SERPL CREATININE-BSD FRML MDRD: ABNORMAL ML/MIN/1.7M2
GLUCOSE SERPL-MCNC: 99 MG/DL (ref 70–99)
HCT VFR BLD AUTO: 27.2 % (ref 35–47)
HGB BLD-MCNC: 8.1 G/DL (ref 11.7–15.7)
INR PPP: 1.15 (ref 0.86–1.14)
MAGNESIUM SERPL-MCNC: 1.9 MG/DL (ref 1.6–2.3)
MCH RBC QN AUTO: 28.1 PG (ref 26.5–33)
MCHC RBC AUTO-ENTMCNC: 29.8 G/DL (ref 31.5–36.5)
MCV RBC AUTO: 94 FL (ref 78–100)
PHOSPHATE SERPL-MCNC: 3.1 MG/DL (ref 2.5–4.5)
PLATELET # BLD AUTO: 454 10E9/L (ref 150–450)
POTASSIUM SERPL-SCNC: 4 MMOL/L (ref 3.4–5.3)
PREALB SERPL IA-MCNC: 19 MG/DL (ref 15–45)
PROT SERPL-MCNC: 7.2 G/DL (ref 6.8–8.8)
RBC # BLD AUTO: 2.88 10E12/L (ref 3.8–5.2)
SODIUM SERPL-SCNC: 138 MMOL/L (ref 133–144)
WBC # BLD AUTO: 11.6 10E9/L (ref 4–11)

## 2017-06-05 PROCEDURE — 25000132 ZZH RX MED GY IP 250 OP 250 PS 637: Mod: GY | Performed by: STUDENT IN AN ORGANIZED HEALTH CARE EDUCATION/TRAINING PROGRAM

## 2017-06-05 PROCEDURE — 12000003 ZZH R&B CRITICAL UMMC

## 2017-06-05 PROCEDURE — 84100 ASSAY OF PHOSPHORUS: CPT | Performed by: STUDENT IN AN ORGANIZED HEALTH CARE EDUCATION/TRAINING PROGRAM

## 2017-06-05 PROCEDURE — 25000125 ZZHC RX 250: Performed by: SURGERY

## 2017-06-05 PROCEDURE — A9270 NON-COVERED ITEM OR SERVICE: HCPCS | Mod: GY | Performed by: SURGERY

## 2017-06-05 PROCEDURE — 83735 ASSAY OF MAGNESIUM: CPT | Performed by: STUDENT IN AN ORGANIZED HEALTH CARE EDUCATION/TRAINING PROGRAM

## 2017-06-05 PROCEDURE — 97535 SELF CARE MNGMENT TRAINING: CPT | Mod: GO

## 2017-06-05 PROCEDURE — 25000128 H RX IP 250 OP 636: Performed by: STUDENT IN AN ORGANIZED HEALTH CARE EDUCATION/TRAINING PROGRAM

## 2017-06-05 PROCEDURE — 80053 COMPREHEN METABOLIC PANEL: CPT | Performed by: STUDENT IN AN ORGANIZED HEALTH CARE EDUCATION/TRAINING PROGRAM

## 2017-06-05 PROCEDURE — 25000125 ZZHC RX 250

## 2017-06-05 PROCEDURE — 25000128 H RX IP 250 OP 636: Performed by: THORACIC SURGERY (CARDIOTHORACIC VASCULAR SURGERY)

## 2017-06-05 PROCEDURE — 36592 COLLECT BLOOD FROM PICC: CPT | Performed by: STUDENT IN AN ORGANIZED HEALTH CARE EDUCATION/TRAINING PROGRAM

## 2017-06-05 PROCEDURE — 25000132 ZZH RX MED GY IP 250 OP 250 PS 637: Mod: GY | Performed by: SURGERY

## 2017-06-05 PROCEDURE — A9270 NON-COVERED ITEM OR SERVICE: HCPCS | Mod: GY | Performed by: STUDENT IN AN ORGANIZED HEALTH CARE EDUCATION/TRAINING PROGRAM

## 2017-06-05 PROCEDURE — 71020 XR CHEST 2 VW: CPT

## 2017-06-05 PROCEDURE — 25000125 ZZHC RX 250: Performed by: STUDENT IN AN ORGANIZED HEALTH CARE EDUCATION/TRAINING PROGRAM

## 2017-06-05 PROCEDURE — A9270 NON-COVERED ITEM OR SERVICE: HCPCS | Mod: GY | Performed by: PHYSICIAN ASSISTANT

## 2017-06-05 PROCEDURE — 27210429 ZZH NUTRITION PRODUCT INTERMEDIATE LITER

## 2017-06-05 PROCEDURE — 25000132 ZZH RX MED GY IP 250 OP 250 PS 637: Mod: GY | Performed by: PHYSICIAN ASSISTANT

## 2017-06-05 PROCEDURE — 85610 PROTHROMBIN TIME: CPT | Performed by: STUDENT IN AN ORGANIZED HEALTH CARE EDUCATION/TRAINING PROGRAM

## 2017-06-05 PROCEDURE — 93971 EXTREMITY STUDY: CPT | Mod: RT

## 2017-06-05 PROCEDURE — 40000133 ZZH STATISTIC OT WARD VISIT

## 2017-06-05 PROCEDURE — 25800025 ZZH RX 258: Performed by: STUDENT IN AN ORGANIZED HEALTH CARE EDUCATION/TRAINING PROGRAM

## 2017-06-05 PROCEDURE — 40000558 ZZH STATISTIC CVC DRESSING CHANGE

## 2017-06-05 PROCEDURE — 97530 THERAPEUTIC ACTIVITIES: CPT | Mod: GO

## 2017-06-05 PROCEDURE — 85027 COMPLETE CBC AUTOMATED: CPT | Performed by: STUDENT IN AN ORGANIZED HEALTH CARE EDUCATION/TRAINING PROGRAM

## 2017-06-05 PROCEDURE — 84134 ASSAY OF PREALBUMIN: CPT | Performed by: STUDENT IN AN ORGANIZED HEALTH CARE EDUCATION/TRAINING PROGRAM

## 2017-06-05 RX ORDER — BACITRACIN ZINC 500 [USP'U]/G
OINTMENT TOPICAL 2 TIMES DAILY
Status: DISCONTINUED | OUTPATIENT
Start: 2017-06-06 | End: 2017-06-29 | Stop reason: HOSPADM

## 2017-06-05 RX ORDER — CHLORHEXIDINE GLUCONATE ORAL RINSE 1.2 MG/ML
15 SOLUTION DENTAL 3 TIMES DAILY
Status: DISCONTINUED | OUTPATIENT
Start: 2017-06-05 | End: 2017-06-29 | Stop reason: HOSPADM

## 2017-06-05 RX ORDER — CHLORHEXIDINE GLUCONATE 2% 2 G/100ML
SOLUTION TOPICAL 2 TIMES DAILY
Status: DISCONTINUED | OUTPATIENT
Start: 2017-06-06 | End: 2017-06-05 | Stop reason: RX

## 2017-06-05 RX ADMIN — HYDROMORPHONE HYDROCHLORIDE 0.5 MG: 1 INJECTION, SOLUTION INTRAMUSCULAR; INTRAVENOUS; SUBCUTANEOUS at 19:45

## 2017-06-05 RX ADMIN — HYDROMORPHONE HYDROCHLORIDE 0.5 MG: 1 INJECTION, SOLUTION INTRAMUSCULAR; INTRAVENOUS; SUBCUTANEOUS at 18:35

## 2017-06-05 RX ADMIN — GABAPENTIN 300 MG: 250 SOLUTION ORAL at 20:02

## 2017-06-05 RX ADMIN — HYDROMORPHONE HYDROCHLORIDE 0.5 MG: 1 INJECTION, SOLUTION INTRAMUSCULAR; INTRAVENOUS; SUBCUTANEOUS at 12:02

## 2017-06-05 RX ADMIN — POTASSIUM CHLORIDE: 2 INJECTION, SOLUTION, CONCENTRATE INTRAVENOUS at 13:44

## 2017-06-05 RX ADMIN — Medication 2 G: at 11:52

## 2017-06-05 RX ADMIN — PIPERACILLIN SODIUM,TAZOBACTAM SODIUM 3.38 G: 3; .375 INJECTION, POWDER, FOR SOLUTION INTRAVENOUS at 03:43

## 2017-06-05 RX ADMIN — METOPROLOL TARTRATE 25 MG: 100 TABLET ORAL at 07:55

## 2017-06-05 RX ADMIN — SIMETHICONE 40 MG: 20 SUSPENSION/ DROPS ORAL at 11:52

## 2017-06-05 RX ADMIN — ACETAMINOPHEN 975 MG: 160 SUSPENSION ORAL at 11:53

## 2017-06-05 RX ADMIN — LOPERAMIDE HYDROCHLORIDE 4 MG: 1 SOLUTION ORAL at 16:34

## 2017-06-05 RX ADMIN — I.V. FAT EMULSION 250 ML: 20 EMULSION INTRAVENOUS at 20:18

## 2017-06-05 RX ADMIN — Medication 10 ML: at 11:52

## 2017-06-05 RX ADMIN — LOPERAMIDE HYDROCHLORIDE 4 MG: 1 SOLUTION ORAL at 20:02

## 2017-06-05 RX ADMIN — LIDOCAINE 1 PATCH: 50 PATCH TOPICAL at 12:16

## 2017-06-05 RX ADMIN — TRAZODONE HYDROCHLORIDE 100 MG: 100 TABLET ORAL at 22:38

## 2017-06-05 RX ADMIN — HYDROMORPHONE HYDROCHLORIDE 0.5 MG: 1 INJECTION, SOLUTION INTRAMUSCULAR; INTRAVENOUS; SUBCUTANEOUS at 14:45

## 2017-06-05 RX ADMIN — Medication 6 MG: at 03:43

## 2017-06-05 RX ADMIN — PIPERACILLIN SODIUM,TAZOBACTAM SODIUM 3.38 G: 3; .375 INJECTION, POWDER, FOR SOLUTION INTRAVENOUS at 10:19

## 2017-06-05 RX ADMIN — PIPERACILLIN SODIUM,TAZOBACTAM SODIUM 3.38 G: 3; .375 INJECTION, POWDER, FOR SOLUTION INTRAVENOUS at 20:00

## 2017-06-05 RX ADMIN — POTASSIUM CHLORIDE, DEXTROSE MONOHYDRATE AND SODIUM CHLORIDE: 150; 5; 450 INJECTION, SOLUTION INTRAVENOUS at 11:53

## 2017-06-05 RX ADMIN — GABAPENTIN 300 MG: 250 SOLUTION ORAL at 11:52

## 2017-06-05 RX ADMIN — HEPARIN SODIUM 5000 UNITS: 5000 INJECTION, SOLUTION INTRAVENOUS; SUBCUTANEOUS at 05:45

## 2017-06-05 RX ADMIN — Medication 6 MG: at 22:38

## 2017-06-05 RX ADMIN — OXYCODONE HYDROCHLORIDE 15 MG: 5 SOLUTION ORAL at 22:38

## 2017-06-05 RX ADMIN — PANTOPRAZOLE SODIUM 40 MG: 40 TABLET, DELAYED RELEASE ORAL at 07:54

## 2017-06-05 RX ADMIN — SODIUM CHLORIDE, PRESERVATIVE FREE 5 ML: 5 INJECTION INTRAVENOUS at 16:36

## 2017-06-05 RX ADMIN — CHLORHEXIDINE GLUCONATE 15 ML: 1.2 RINSE ORAL at 20:18

## 2017-06-05 RX ADMIN — HEPARIN SODIUM 5000 UNITS: 5000 INJECTION, SOLUTION INTRAVENOUS; SUBCUTANEOUS at 23:08

## 2017-06-05 RX ADMIN — GABAPENTIN 300 MG: 250 SOLUTION ORAL at 03:43

## 2017-06-05 RX ADMIN — ACETAMINOPHEN 975 MG: 160 SUSPENSION ORAL at 03:44

## 2017-06-05 RX ADMIN — Medication 6 MG: at 16:33

## 2017-06-05 RX ADMIN — OXYCODONE HYDROCHLORIDE 15 MG: 5 SOLUTION ORAL at 03:44

## 2017-06-05 RX ADMIN — OXYCODONE HYDROCHLORIDE 15 MG: 5 SOLUTION ORAL at 07:01

## 2017-06-05 RX ADMIN — ACETAMINOPHEN 975 MG: 160 SUSPENSION ORAL at 20:02

## 2017-06-05 RX ADMIN — Medication 10 ML: at 16:33

## 2017-06-05 RX ADMIN — Medication 10 ML: at 20:02

## 2017-06-05 RX ADMIN — LOPERAMIDE HYDROCHLORIDE 4 MG: 1 SOLUTION ORAL at 11:52

## 2017-06-05 RX ADMIN — METOPROLOL TARTRATE 25 MG: 100 TABLET ORAL at 20:02

## 2017-06-05 RX ADMIN — HYDROMORPHONE HYDROCHLORIDE 0.5 MG: 1 INJECTION, SOLUTION INTRAMUSCULAR; INTRAVENOUS; SUBCUTANEOUS at 21:41

## 2017-06-05 RX ADMIN — OXYCODONE HYDROCHLORIDE 15 MG: 5 SOLUTION ORAL at 16:32

## 2017-06-05 RX ADMIN — HYDROMORPHONE HYDROCHLORIDE 0.5 MG: 1 INJECTION, SOLUTION INTRAMUSCULAR; INTRAVENOUS; SUBCUTANEOUS at 08:38

## 2017-06-05 RX ADMIN — LOPERAMIDE HYDROCHLORIDE 4 MG: 1 SOLUTION ORAL at 07:54

## 2017-06-05 RX ADMIN — MINERAL SUPPLEMENT IRON 300 MG / 5 ML STRENGTH LIQUID 100 PER BOX UNFLAVORED 300 MG: at 07:54

## 2017-06-05 RX ADMIN — OXYCODONE HYDROCHLORIDE 15 MG: 5 SOLUTION ORAL at 19:39

## 2017-06-05 RX ADMIN — Medication 6 MG: at 10:19

## 2017-06-05 RX ADMIN — Medication 10 ML: at 07:55

## 2017-06-05 RX ADMIN — HEPARIN SODIUM 5000 UNITS: 5000 INJECTION, SOLUTION INTRAVENOUS; SUBCUTANEOUS at 13:09

## 2017-06-05 RX ADMIN — OXYCODONE HYDROCHLORIDE 15 MG: 5 SOLUTION ORAL at 10:18

## 2017-06-05 RX ADMIN — OXYCODONE HYDROCHLORIDE 15 MG: 5 SOLUTION ORAL at 01:12

## 2017-06-05 RX ADMIN — FLUCONAZOLE 200 MG: 2 INJECTION INTRAVENOUS at 23:12

## 2017-06-05 RX ADMIN — OXYCODONE HYDROCHLORIDE 15 MG: 5 SOLUTION ORAL at 13:09

## 2017-06-05 NOTE — PLAN OF CARE
Problem: Goal Outcome Summary  Goal: Goal Outcome Summary  Outcome: No Change  AVSS. Sats 99% RA. Denies SOB. C/o increased pain around pharyngostomy tube, tolerable w/scheduled oxycodone and IV dilaudid PRN. Chest dressing CDI, changed per thoracic team. Pharyngostomy tube to LIS, 150mL output. Open chest gravity drain, 15mL bloody output. NPO. TF at 15mL/hr throughout shift, pt adjusts herself d/t diarrhea, MD's aware. Liquid watery stools. Voiding adequate amounts. TPN infusing at 60mL/hr via double PICC. Mag replaced per protocol. Independent to commode. Continue POC.

## 2017-06-05 NOTE — PLAN OF CARE
Problem: Goal Outcome Summary  Goal: Goal Outcome Summary  Outcome: Therapy, progress toward functional goals as expected  OT/7B - Facilitated functional mobility ~400 feet with unilateral support on IV pole. SBA for walk in shower transfer. Pt limited by post surgical precautions, fatigue, and pain.     REC: Home with assist prn, continue HH RN/PT services.

## 2017-06-05 NOTE — PROGRESS NOTES
Thoracic surgery progress note  17    No issues overnight. Had some increased pain post procedure yesterday. Feels sore in neck and chest.     Temp: 97.9  F (36.6  C) Temp  Min: 96.1  F (35.6  C)  Max: 98.2  F (36.8  C)  Resp: 16 Resp  Min: 9  Max: 16  SpO2: 99 % SpO2  Min: 95 %  Max: 100 %    No Data Recorded  Heart Rate: 80 Heart Rate  Min: 67  Max: 80  BP: 139/68 Systolic (24hrs), Av , Min:124 , Max:182   Diastolic (24hrs), Av, Min:57, Max:93    Laying comfortably in bed in no acute distress  Awake, alert and appropriate  Non-labored breathing  Regular rate and rhythm  Abdomen soft  Dressings in tact  Pharyngostomy tube functioning. Site dressed.     I/O last 3 completed shifts:  In: 1581 [I.V.:1281; NG/GT:30]  Out: 3305 [Urine:2600; Drains:555; Other:150]     Labs reviewed. Leukocytosis to 11.6. Mg 1.9. Phos 3.1. Albumin 2, prelabumin 19    72F w/ chronic esophageal perforation s/p Nissen at OSH, now s/p esophagectomy with gastric pullup c/b recurrent anastamotic leak and mediastinal abscess s/p serial washouts, pharyngostomy tube and most recently and EGD with stent placement on     Restart TPN, continue tube feeds as tolerated  Bedside dressing change today  Zosyn/fluc from , plan to discontinue on .   Continue CT to gravity  Pharyngostomy to LIWS     Seen w/ thoracic team,    Viki Carmen MD  General Surgery Resident  Pager: [phone]

## 2017-06-05 NOTE — PLAN OF CARE
Problem: Goal Outcome Summary  Goal: Goal Outcome Summary  Outcome: No Change  Vitals:     06/04/17 1547 06/04/17 1846 06/04/17 2056 06/04/17 2202   BP: 159/80   143/66 124/57   BP Location: Left arm   Left arm Left arm   Cuff Size:           Pulse:           Resp: 16 16 16   Temp: 98.2  F (36.8  C)   98  F (36.7  C) 97.3  F (36.3  C)   TempSrc: Oral   Oral Oral   SpO2: 97%   98% 97%   Weight:   49.5 kg (109 lb 3.2 oz)       Height:             Pt is alert.Taking dilaudid and oxycodone for pain. New PICC line in place.IVF infusing. Chest tube to gravity.Pharyngostomy tube to LIS.Tube feeding running 20 ml/hr .Patient is adjusting.Chest tube dressing intact.Will monitor.

## 2017-06-05 NOTE — PLAN OF CARE
Problem: Goal Outcome Summary  Goal: Goal Outcome Summary  Outcome: No Change  /57 (BP Location: Left arm)  Pulse 72  Temp 97.3  F (36.3  C) (Oral)  Resp 16  Ht 1.524 m (5')  Wt 49.5 kg (109 lb 3.2 oz)  SpO2 97%  Breastfeeding? No  BMI 21.33 kg/m2     3480-0022: VSS, afebrile.  Pt calm and cooperative overnight.  No complaints of nausea or SOB.  Reported pain, tolerated with scheduled oxycodone.  Pharyngostomy tube to LIS, container amount 400cc at 0600.  Chest tube to gravity.  Chest wound packing C/D/I.  J-tube with tube feedings to 15ml/hr through the night.  Pt decreases and monitors on own, MDs aware.  Pt up to bedside commode independently, loose stools still reported.  Pt otherwise comfortable, continue with POC.

## 2017-06-05 NOTE — ANESTHESIA PREPROCEDURE EVALUATION
Anesthesia Evaluation     . Pt has had prior anesthetic. Type: General    No history of anesthetic complications          ROS/MED HX    ENT/Pulmonary:     (+)tobacco use, Past use , recent URI . .    Neurologic:     (+)Multiple Sclerosis     Cardiovascular:     (+) ----. : . . . :. dysrhythmias a-fib, . Previous cardiac testing Echodate:1/2017results:Hyperdynamic right and left ventricles. Normal wall motion and normal valves. Mild Pulmonary HTNdate: results:ECG reviewed date:4/2017 results:ST= 114. Non-specific S-T changes date: results:          METS/Exercise Tolerance:     Hematologic: Comments: Hgb= 7.9    (+) Anemia, History of Transfusion no previous transfusion reaction -      Musculoskeletal: Comment: Fibromyalgia  (+) arthritis, , , -       GI/Hepatic:     (+) GERD hiatal hernia, Other GI/Hepatic S/P Esophagectomy complicated by anastomosis leak and Mediastinal abscess      Renal/Genitourinary:  - ROS Renal section negative       Endo:  - neg endo ROS       Psychiatric:  - neg psychiatric ROS       Infectious Disease:         Malignancy:   (+) Malignancy History of Breast  Breast CA Remission status post Surgery, Chemo and Radiation.         Other:    (+) H/O Chronic Pain,H/O chronic opiod use ,                    Physical Exam      Airway   Mallampati: II  TM distance: >3 FB  Neck ROM: limited    Dental     Cardiovascular   Rhythm and rate: regular and normal      Pulmonary                     Anesthesia Plan      History & Physical Review  History and physical reviewed and following examination; no interval change.    ASA Status:  3 .        Plan for General, RSI and ETT with Intravenous and Propofol induction. Maintenance will be Balanced.    PONV prophylaxis:  Ondansetron (or other 5HT-3)  Additional equipment: Videolaryngoscope      Postoperative Care  Postoperative pain management:  IV analgesics.      Consents

## 2017-06-06 ENCOUNTER — APPOINTMENT (OUTPATIENT)
Dept: OCCUPATIONAL THERAPY | Facility: CLINIC | Age: 72
DRG: 856 | End: 2017-06-06
Attending: STUDENT IN AN ORGANIZED HEALTH CARE EDUCATION/TRAINING PROGRAM
Payer: MEDICARE

## 2017-06-06 ENCOUNTER — MEDICAL CORRESPONDENCE (OUTPATIENT)
Dept: HEALTH INFORMATION MANAGEMENT | Facility: CLINIC | Age: 72
End: 2017-06-06

## 2017-06-06 LAB
ANION GAP SERPL CALCULATED.3IONS-SCNC: 6 MMOL/L (ref 3–14)
BUN SERPL-MCNC: 13 MG/DL (ref 7–30)
CALCIUM SERPL-MCNC: 8.6 MG/DL (ref 8.5–10.1)
CHLORIDE SERPL-SCNC: 105 MMOL/L (ref 94–109)
CO2 SERPL-SCNC: 27 MMOL/L (ref 20–32)
CREAT SERPL-MCNC: 0.4 MG/DL (ref 0.52–1.04)
GFR SERPL CREATININE-BSD FRML MDRD: ABNORMAL ML/MIN/1.7M2
GLUCOSE BLDC GLUCOMTR-MCNC: 113 MG/DL (ref 70–99)
GLUCOSE SERPL-MCNC: 94 MG/DL (ref 70–99)
MAGNESIUM SERPL-MCNC: 2.2 MG/DL (ref 1.6–2.3)
PHOSPHATE SERPL-MCNC: 3.6 MG/DL (ref 2.5–4.5)
PLATELET # BLD AUTO: 451 10E9/L (ref 150–450)
POTASSIUM SERPL-SCNC: 4.1 MMOL/L (ref 3.4–5.3)
SODIUM SERPL-SCNC: 138 MMOL/L (ref 133–144)

## 2017-06-06 PROCEDURE — 40000133 ZZH STATISTIC OT WARD VISIT: Performed by: OCCUPATIONAL THERAPIST

## 2017-06-06 PROCEDURE — 97535 SELF CARE MNGMENT TRAINING: CPT | Mod: GO | Performed by: OCCUPATIONAL THERAPIST

## 2017-06-06 PROCEDURE — 25000125 ZZHC RX 250: Performed by: STUDENT IN AN ORGANIZED HEALTH CARE EDUCATION/TRAINING PROGRAM

## 2017-06-06 PROCEDURE — 25000132 ZZH RX MED GY IP 250 OP 250 PS 637: Mod: GY | Performed by: SURGERY

## 2017-06-06 PROCEDURE — 27210429 ZZH NUTRITION PRODUCT INTERMEDIATE LITER

## 2017-06-06 PROCEDURE — 12000003 ZZH R&B CRITICAL UMMC

## 2017-06-06 PROCEDURE — 80048 BASIC METABOLIC PNL TOTAL CA: CPT | Performed by: STUDENT IN AN ORGANIZED HEALTH CARE EDUCATION/TRAINING PROGRAM

## 2017-06-06 PROCEDURE — 25000125 ZZHC RX 250: Performed by: PHYSICIAN ASSISTANT

## 2017-06-06 PROCEDURE — 84100 ASSAY OF PHOSPHORUS: CPT | Performed by: STUDENT IN AN ORGANIZED HEALTH CARE EDUCATION/TRAINING PROGRAM

## 2017-06-06 PROCEDURE — A9270 NON-COVERED ITEM OR SERVICE: HCPCS | Mod: GY | Performed by: SURGERY

## 2017-06-06 PROCEDURE — A9270 NON-COVERED ITEM OR SERVICE: HCPCS | Mod: GY | Performed by: STUDENT IN AN ORGANIZED HEALTH CARE EDUCATION/TRAINING PROGRAM

## 2017-06-06 PROCEDURE — 25000128 H RX IP 250 OP 636: Performed by: THORACIC SURGERY (CARDIOTHORACIC VASCULAR SURGERY)

## 2017-06-06 PROCEDURE — 00000146 ZZHCL STATISTIC GLUCOSE BY METER IP

## 2017-06-06 PROCEDURE — A9270 NON-COVERED ITEM OR SERVICE: HCPCS | Mod: GY | Performed by: PHYSICIAN ASSISTANT

## 2017-06-06 PROCEDURE — 25000128 H RX IP 250 OP 636: Performed by: STUDENT IN AN ORGANIZED HEALTH CARE EDUCATION/TRAINING PROGRAM

## 2017-06-06 PROCEDURE — 40000558 ZZH STATISTIC CVC DRESSING CHANGE

## 2017-06-06 PROCEDURE — 83735 ASSAY OF MAGNESIUM: CPT | Performed by: STUDENT IN AN ORGANIZED HEALTH CARE EDUCATION/TRAINING PROGRAM

## 2017-06-06 PROCEDURE — 25000132 ZZH RX MED GY IP 250 OP 250 PS 637: Mod: GY | Performed by: PHYSICIAN ASSISTANT

## 2017-06-06 PROCEDURE — 25000125 ZZHC RX 250

## 2017-06-06 PROCEDURE — 25000132 ZZH RX MED GY IP 250 OP 250 PS 637: Mod: GY | Performed by: STUDENT IN AN ORGANIZED HEALTH CARE EDUCATION/TRAINING PROGRAM

## 2017-06-06 PROCEDURE — 36592 COLLECT BLOOD FROM PICC: CPT | Performed by: THORACIC SURGERY (CARDIOTHORACIC VASCULAR SURGERY)

## 2017-06-06 PROCEDURE — 85049 AUTOMATED PLATELET COUNT: CPT | Performed by: THORACIC SURGERY (CARDIOTHORACIC VASCULAR SURGERY)

## 2017-06-06 RX ADMIN — CHLORHEXIDINE GLUCONATE 15 ML: 1.2 RINSE ORAL at 08:52

## 2017-06-06 RX ADMIN — PIPERACILLIN SODIUM,TAZOBACTAM SODIUM 3.38 G: 3; .375 INJECTION, POWDER, FOR SOLUTION INTRAVENOUS at 20:47

## 2017-06-06 RX ADMIN — MINERAL SUPPLEMENT IRON 300 MG / 5 ML STRENGTH LIQUID 100 PER BOX UNFLAVORED 300 MG: at 08:52

## 2017-06-06 RX ADMIN — Medication 6 MG: at 16:36

## 2017-06-06 RX ADMIN — Medication 1 PACKET: at 20:45

## 2017-06-06 RX ADMIN — HYDROMORPHONE HYDROCHLORIDE 0.5 MG: 1 INJECTION, SOLUTION INTRAMUSCULAR; INTRAVENOUS; SUBCUTANEOUS at 08:46

## 2017-06-06 RX ADMIN — Medication 10 ML: at 12:02

## 2017-06-06 RX ADMIN — GABAPENTIN 300 MG: 250 SOLUTION ORAL at 12:02

## 2017-06-06 RX ADMIN — HYDROMORPHONE HYDROCHLORIDE 0.5 MG: 1 INJECTION, SOLUTION INTRAMUSCULAR; INTRAVENOUS; SUBCUTANEOUS at 18:09

## 2017-06-06 RX ADMIN — PIPERACILLIN SODIUM,TAZOBACTAM SODIUM 3.38 G: 3; .375 INJECTION, POWDER, FOR SOLUTION INTRAVENOUS at 08:53

## 2017-06-06 RX ADMIN — OXYCODONE HYDROCHLORIDE 15 MG: 5 SOLUTION ORAL at 04:04

## 2017-06-06 RX ADMIN — METOPROLOL TARTRATE 25 MG: 100 TABLET ORAL at 08:52

## 2017-06-06 RX ADMIN — OXYCODONE HYDROCHLORIDE 15 MG: 5 SOLUTION ORAL at 22:14

## 2017-06-06 RX ADMIN — PANTOPRAZOLE SODIUM 40 MG: 40 TABLET, DELAYED RELEASE ORAL at 08:52

## 2017-06-06 RX ADMIN — Medication 6 MG: at 04:05

## 2017-06-06 RX ADMIN — HYDROMORPHONE HYDROCHLORIDE 0.5 MG: 1 INJECTION, SOLUTION INTRAMUSCULAR; INTRAVENOUS; SUBCUTANEOUS at 21:44

## 2017-06-06 RX ADMIN — HYDROMORPHONE HYDROCHLORIDE 0.5 MG: 1 INJECTION, SOLUTION INTRAMUSCULAR; INTRAVENOUS; SUBCUTANEOUS at 14:36

## 2017-06-06 RX ADMIN — OXYCODONE HYDROCHLORIDE 15 MG: 5 SOLUTION ORAL at 16:36

## 2017-06-06 RX ADMIN — SIMETHICONE 40 MG: 20 SUSPENSION/ DROPS ORAL at 14:36

## 2017-06-06 RX ADMIN — BACITRACIN ZINC: 500 OINTMENT TOPICAL at 08:54

## 2017-06-06 RX ADMIN — Medication 6 MG: at 10:19

## 2017-06-06 RX ADMIN — PIPERACILLIN SODIUM,TAZOBACTAM SODIUM 3.38 G: 3; .375 INJECTION, POWDER, FOR SOLUTION INTRAVENOUS at 01:07

## 2017-06-06 RX ADMIN — Medication 10 ML: at 16:35

## 2017-06-06 RX ADMIN — LOPERAMIDE HYDROCHLORIDE 4 MG: 1 SOLUTION ORAL at 16:35

## 2017-06-06 RX ADMIN — OXYCODONE HYDROCHLORIDE 15 MG: 5 SOLUTION ORAL at 13:22

## 2017-06-06 RX ADMIN — OXYCODONE HYDROCHLORIDE 15 MG: 5 SOLUTION ORAL at 07:00

## 2017-06-06 RX ADMIN — LOPERAMIDE HYDROCHLORIDE 4 MG: 1 SOLUTION ORAL at 20:44

## 2017-06-06 RX ADMIN — OXYCODONE HYDROCHLORIDE 15 MG: 5 SOLUTION ORAL at 19:14

## 2017-06-06 RX ADMIN — Medication 10 ML: at 08:52

## 2017-06-06 RX ADMIN — TRAZODONE HYDROCHLORIDE 100 MG: 100 TABLET ORAL at 22:14

## 2017-06-06 RX ADMIN — GABAPENTIN 300 MG: 250 SOLUTION ORAL at 20:44

## 2017-06-06 RX ADMIN — Medication 10 ML: at 20:43

## 2017-06-06 RX ADMIN — CHLORHEXIDINE GLUCONATE 15 ML: 1.2 RINSE ORAL at 14:32

## 2017-06-06 RX ADMIN — HEPARIN SODIUM 5000 UNITS: 5000 INJECTION, SOLUTION INTRAVENOUS; SUBCUTANEOUS at 22:14

## 2017-06-06 RX ADMIN — Medication 6 MG: at 22:15

## 2017-06-06 RX ADMIN — SIMETHICONE 40 MG: 20 SUSPENSION/ DROPS ORAL at 21:07

## 2017-06-06 RX ADMIN — ACETAMINOPHEN 975 MG: 160 SUSPENSION ORAL at 20:43

## 2017-06-06 RX ADMIN — ACETAMINOPHEN 975 MG: 160 SUSPENSION ORAL at 12:02

## 2017-06-06 RX ADMIN — ACETAMINOPHEN 975 MG: 160 SUSPENSION ORAL at 04:04

## 2017-06-06 RX ADMIN — POTASSIUM CHLORIDE: 2 INJECTION, SOLUTION, CONCENTRATE INTRAVENOUS at 20:46

## 2017-06-06 RX ADMIN — LOPERAMIDE HYDROCHLORIDE 4 MG: 1 SOLUTION ORAL at 08:52

## 2017-06-06 RX ADMIN — LOPERAMIDE HYDROCHLORIDE 4 MG: 1 SOLUTION ORAL at 12:03

## 2017-06-06 RX ADMIN — HEPARIN SODIUM 5000 UNITS: 5000 INJECTION, SOLUTION INTRAVENOUS; SUBCUTANEOUS at 06:01

## 2017-06-06 RX ADMIN — HYDROMORPHONE HYDROCHLORIDE 0.5 MG: 1 INJECTION, SOLUTION INTRAMUSCULAR; INTRAVENOUS; SUBCUTANEOUS at 20:42

## 2017-06-06 RX ADMIN — BACITRACIN ZINC: 500 OINTMENT TOPICAL at 20:44

## 2017-06-06 RX ADMIN — OXYCODONE HYDROCHLORIDE 15 MG: 5 SOLUTION ORAL at 01:03

## 2017-06-06 RX ADMIN — METOPROLOL TARTRATE 25 MG: 100 TABLET ORAL at 20:44

## 2017-06-06 RX ADMIN — OXYCODONE HYDROCHLORIDE 15 MG: 5 SOLUTION ORAL at 10:19

## 2017-06-06 RX ADMIN — I.V. FAT EMULSION 250 ML: 20 EMULSION INTRAVENOUS at 20:45

## 2017-06-06 RX ADMIN — CHLORHEXIDINE GLUCONATE 15 ML: 1.2 RINSE ORAL at 20:43

## 2017-06-06 RX ADMIN — PIPERACILLIN SODIUM,TAZOBACTAM SODIUM 3.38 G: 3; .375 INJECTION, POWDER, FOR SOLUTION INTRAVENOUS at 14:31

## 2017-06-06 RX ADMIN — FLUCONAZOLE 200 MG: 2 INJECTION INTRAVENOUS at 22:18

## 2017-06-06 RX ADMIN — HEPARIN SODIUM 5000 UNITS: 5000 INJECTION, SOLUTION INTRAVENOUS; SUBCUTANEOUS at 13:22

## 2017-06-06 RX ADMIN — GABAPENTIN 300 MG: 250 SOLUTION ORAL at 04:05

## 2017-06-06 RX ADMIN — HYDROMORPHONE HYDROCHLORIDE 0.5 MG: 1 INJECTION, SOLUTION INTRAMUSCULAR; INTRAVENOUS; SUBCUTANEOUS at 12:03

## 2017-06-06 NOTE — PLAN OF CARE
Problem: Goal Outcome Summary  Goal: Goal Outcome Summary  Outcome: No Change  AVSS. Sats 97% RA. Denies SOB. C/o increased pain around pharyngostomy tube and abd, tolerable w/scheduled oxycodone and IV dilaudid PRN. Chest dressing CDI, changed per thoracic team. Pharyngostomy tube to LIS, 200mL output, Hibiclens and bacitracin applied. Open chest gravity drain, scant output not enough to measure. Lymphedema wraps to bilateral legs. NPO. TF at 15mL/hr throughout shift, pt adjusts herself d/t diarrhea, MD's aware. Liquid watery stools. Voiding adequate amounts. TPN infusing at 60mL/hr via double PICC. Independent to commode. Continue POC.

## 2017-06-06 NOTE — PLAN OF CARE
Problem: Goal Outcome Summary  Goal: Goal Outcome Summary  VSS, afebrile. Pain not controlled. Pt found crying in room. High anxiety and pain untolerable. Oxycodone and IV Dilaudid given q 1 hour which was effective. Pt resting after peak. Will continue to offer IV dilaudid as necessary for severe pain. Scant drainage from wound drain. Pharyngostomy tube intact. Stitches to secure. Bacitracin applied. Pt very uncomfortable. States tube makes it difficult to open jaw and ear pain is very high. No noted drainage from suture or insertion sites. Placed to LIS. Moderate output noted. Chest drain from wound intact. Scant serosanguineous drainage noted. Dressing intact. Significant pain reported from this tube. Tube feed infusing through j tube. TPN and lipids through PICC. Dressing site leaking. Ultrasound done. No DVT noted. Pt up to commode multiple time. Liquid diarrhea. Generalized edema. Lymphedema wraps in place which are effective. Will continue with POC.

## 2017-06-06 NOTE — PLAN OF CARE
Problem: Goal Outcome Summary  Goal: Goal Outcome Summary  OT/edema: Pt continues with good reductions, decreasing in 8/12 LE landmarks. Little/no pitting in feet and mild non pitting in leg and 1-2+ around and above knee. Replaced size E comperm to avoid refill. Pt to wear compression during the day and doff at night. Please remove garment if it gets soiled or if pt c/o LE pain or numbness.

## 2017-06-06 NOTE — PLAN OF CARE
Problem: Goal Outcome Summary  Goal: Goal Outcome Summary  Outcome: No Change  /62 (BP Location: Left arm)  Pulse 72  Temp 96.3  F (35.7  C) (Oral)  Resp 16  Ht 1.524 m (5')  Wt 43.8 kg (96 lb 9.6 oz)  SpO2 99%  Breastfeeding? No  BMI 18.87 kg/m2     6431-9025: VSS, Afebrile.  Pt calm and cooperative overnight.  No complaints of nausea or SOB.  Scheduled oxycodone given, managed pain well.  Pt up to bedside commode independenlty, reported watery stools mixed with urine.  Dark tea/ tana in color.  TF running at 10ml/hr at 0000.  Around 0130 pt turned off tube feeds.  Reported inability to sleep due to diarrhea.  Chest packing C/D/I.  Chest wound intact to gravity bag with scant output for shift.  Pharyngostomy to LIS.  PICC reportedly leaking.  Continue with POC.

## 2017-06-06 NOTE — PROGRESS NOTES
Thoracic surgery progress note  17    Continuing to have some pain at the chest drainage tube site and pharyngostomy tube site. Still having diarrhea. PICC replaced and was restarted on TPN yesterday.     Temp: 97.4  F (36.3  C) Temp  Min: 96.3  F (35.7  C)  Max: 97.4  F (36.3  C)  Resp: 16 Resp  Min: 16  Max: 16  SpO2: 97 % SpO2  Min: 97 %  Max: 99 %    No Data Recorded  Heart Rate: 78 Heart Rate  Min: 69  Max: 78  BP: 134/61 Systolic (24hrs), Av , Min:123 , Max:136   Diastolic (24hrs), Av, Min:54, Max:62    Laying comfortably in bed in no acute distress  Awake, alert and appropriate  Non-labored breathing  Regular rate and rhythm  Abdomen soft  Dressing change performed- wounds healing well, superior wound clean with grannulation tissue. Inferior would with scant fibrinous exudate, no obvious purulent drainage.   Pharyngostomy tube functioning. Mild swelling around the tube, no bleeding.     I/O last 3 completed shifts:  In: 1225 [I.V.:715; NG/GT:290]  Out: 2265 [Urine:200; Drains:165; Other:1900]     Labs reviewed.    72F w/ chronic esophageal perforation s/p Nissen at OSH, now s/p esophagectomy with gastric pullup c/b recurrent anastamotic leak and mediastinal abscess s/p serial washouts, pharyngostomy tube and most recently and EGD with stent placement on     TPN and tube feeds as tolerated  Dressing change performed at bedside today without difficulty  Zosyn/fluc from - (discontinue tomorrow).   CT to gravity  Pharyngostomy to LIWS       Seen w/ thoracic team,    Viki Carmen MD  General Surgery Resident  Pager: [phone]

## 2017-06-07 ENCOUNTER — APPOINTMENT (OUTPATIENT)
Dept: GENERAL RADIOLOGY | Facility: CLINIC | Age: 72
DRG: 856 | End: 2017-06-07
Attending: STUDENT IN AN ORGANIZED HEALTH CARE EDUCATION/TRAINING PROGRAM
Payer: MEDICARE

## 2017-06-07 LAB
ANION GAP SERPL CALCULATED.3IONS-SCNC: 8 MMOL/L (ref 3–14)
BUN SERPL-MCNC: 18 MG/DL (ref 7–30)
CALCIUM SERPL-MCNC: 8.6 MG/DL (ref 8.5–10.1)
CHLORIDE SERPL-SCNC: 103 MMOL/L (ref 94–109)
CO2 SERPL-SCNC: 26 MMOL/L (ref 20–32)
CREAT SERPL-MCNC: 0.39 MG/DL (ref 0.52–1.04)
ERYTHROCYTE [DISTWIDTH] IN BLOOD BY AUTOMATED COUNT: 20.3 % (ref 10–15)
GFR SERPL CREATININE-BSD FRML MDRD: ABNORMAL ML/MIN/1.7M2
GLUCOSE SERPL-MCNC: 94 MG/DL (ref 70–99)
HCT VFR BLD AUTO: 25.8 % (ref 35–47)
HGB BLD-MCNC: 7.9 G/DL (ref 11.7–15.7)
MAGNESIUM SERPL-MCNC: 1.8 MG/DL (ref 1.6–2.3)
MCH RBC QN AUTO: 28.9 PG (ref 26.5–33)
MCHC RBC AUTO-ENTMCNC: 30.6 G/DL (ref 31.5–36.5)
MCV RBC AUTO: 95 FL (ref 78–100)
PHOSPHATE SERPL-MCNC: 3.3 MG/DL (ref 2.5–4.5)
PLATELET # BLD AUTO: 391 10E9/L (ref 150–450)
POTASSIUM SERPL-SCNC: 4.1 MMOL/L (ref 3.4–5.3)
RBC # BLD AUTO: 2.73 10E12/L (ref 3.8–5.2)
SODIUM SERPL-SCNC: 136 MMOL/L (ref 133–144)
WBC # BLD AUTO: 10 10E9/L (ref 4–11)

## 2017-06-07 PROCEDURE — 36592 COLLECT BLOOD FROM PICC: CPT | Performed by: STUDENT IN AN ORGANIZED HEALTH CARE EDUCATION/TRAINING PROGRAM

## 2017-06-07 PROCEDURE — 25000132 ZZH RX MED GY IP 250 OP 250 PS 637: Mod: GY | Performed by: STUDENT IN AN ORGANIZED HEALTH CARE EDUCATION/TRAINING PROGRAM

## 2017-06-07 PROCEDURE — 25000132 ZZH RX MED GY IP 250 OP 250 PS 637: Mod: GY | Performed by: PHYSICIAN ASSISTANT

## 2017-06-07 PROCEDURE — 83735 ASSAY OF MAGNESIUM: CPT | Performed by: STUDENT IN AN ORGANIZED HEALTH CARE EDUCATION/TRAINING PROGRAM

## 2017-06-07 PROCEDURE — 84100 ASSAY OF PHOSPHORUS: CPT | Performed by: STUDENT IN AN ORGANIZED HEALTH CARE EDUCATION/TRAINING PROGRAM

## 2017-06-07 PROCEDURE — 25000125 ZZHC RX 250: Performed by: PHYSICIAN ASSISTANT

## 2017-06-07 PROCEDURE — 27210429 ZZH NUTRITION PRODUCT INTERMEDIATE LITER

## 2017-06-07 PROCEDURE — 25000132 ZZH RX MED GY IP 250 OP 250 PS 637: Mod: GY | Performed by: SURGERY

## 2017-06-07 PROCEDURE — 25000128 H RX IP 250 OP 636: Performed by: STUDENT IN AN ORGANIZED HEALTH CARE EDUCATION/TRAINING PROGRAM

## 2017-06-07 PROCEDURE — A9270 NON-COVERED ITEM OR SERVICE: HCPCS | Mod: GY | Performed by: SURGERY

## 2017-06-07 PROCEDURE — 40000558 ZZH STATISTIC CVC DRESSING CHANGE

## 2017-06-07 PROCEDURE — A9270 NON-COVERED ITEM OR SERVICE: HCPCS | Mod: GY | Performed by: PHYSICIAN ASSISTANT

## 2017-06-07 PROCEDURE — 85027 COMPLETE CBC AUTOMATED: CPT | Performed by: STUDENT IN AN ORGANIZED HEALTH CARE EDUCATION/TRAINING PROGRAM

## 2017-06-07 PROCEDURE — 12000003 ZZH R&B CRITICAL UMMC

## 2017-06-07 PROCEDURE — 80048 BASIC METABOLIC PNL TOTAL CA: CPT | Performed by: STUDENT IN AN ORGANIZED HEALTH CARE EDUCATION/TRAINING PROGRAM

## 2017-06-07 PROCEDURE — 74000 XR ABDOMEN 1 VW: CPT

## 2017-06-07 PROCEDURE — 25000128 H RX IP 250 OP 636: Performed by: THORACIC SURGERY (CARDIOTHORACIC VASCULAR SURGERY)

## 2017-06-07 PROCEDURE — 25000125 ZZHC RX 250: Performed by: SURGERY

## 2017-06-07 PROCEDURE — 25000125 ZZHC RX 250: Performed by: STUDENT IN AN ORGANIZED HEALTH CARE EDUCATION/TRAINING PROGRAM

## 2017-06-07 PROCEDURE — A9270 NON-COVERED ITEM OR SERVICE: HCPCS | Mod: GY | Performed by: STUDENT IN AN ORGANIZED HEALTH CARE EDUCATION/TRAINING PROGRAM

## 2017-06-07 PROCEDURE — 25000125 ZZHC RX 250

## 2017-06-07 RX ORDER — SIMETHICONE 40MG/0.6ML
40 SUSPENSION, DROPS(FINAL DOSAGE FORM)(ML) ORAL EVERY 4 HOURS
Status: DISCONTINUED | OUTPATIENT
Start: 2017-06-07 | End: 2017-06-16

## 2017-06-07 RX ORDER — HYDROMORPHONE HYDROCHLORIDE 1 MG/ML
.3-.5 INJECTION, SOLUTION INTRAMUSCULAR; INTRAVENOUS; SUBCUTANEOUS
Status: DISCONTINUED | OUTPATIENT
Start: 2017-06-07 | End: 2017-06-14

## 2017-06-07 RX ADMIN — OXYCODONE HYDROCHLORIDE 15 MG: 5 SOLUTION ORAL at 22:29

## 2017-06-07 RX ADMIN — HYDROMORPHONE HYDROCHLORIDE 0.5 MG: 1 INJECTION, SOLUTION INTRAMUSCULAR; INTRAVENOUS; SUBCUTANEOUS at 10:14

## 2017-06-07 RX ADMIN — OXYCODONE HYDROCHLORIDE 15 MG: 5 SOLUTION ORAL at 04:01

## 2017-06-07 RX ADMIN — BACITRACIN ZINC: 500 OINTMENT TOPICAL at 08:09

## 2017-06-07 RX ADMIN — HYDROMORPHONE HYDROCHLORIDE 0.5 MG: 1 INJECTION, SOLUTION INTRAMUSCULAR; INTRAVENOUS; SUBCUTANEOUS at 18:39

## 2017-06-07 RX ADMIN — BACITRACIN ZINC: 500 OINTMENT TOPICAL at 22:25

## 2017-06-07 RX ADMIN — ACETAMINOPHEN 975 MG: 160 SUSPENSION ORAL at 04:51

## 2017-06-07 RX ADMIN — OXYCODONE HYDROCHLORIDE 15 MG: 5 SOLUTION ORAL at 16:45

## 2017-06-07 RX ADMIN — LOPERAMIDE HYDROCHLORIDE 4 MG: 1 SOLUTION ORAL at 07:49

## 2017-06-07 RX ADMIN — LOPERAMIDE HYDROCHLORIDE 4 MG: 1 SOLUTION ORAL at 20:02

## 2017-06-07 RX ADMIN — Medication 10 ML: at 13:06

## 2017-06-07 RX ADMIN — LIDOCAINE 1 PATCH: 50 PATCH TOPICAL at 07:50

## 2017-06-07 RX ADMIN — ACETAMINOPHEN 975 MG: 160 SUSPENSION ORAL at 11:34

## 2017-06-07 RX ADMIN — MINERAL SUPPLEMENT IRON 300 MG / 5 ML STRENGTH LIQUID 100 PER BOX UNFLAVORED 300 MG: at 07:49

## 2017-06-07 RX ADMIN — Medication 6 MG: at 03:59

## 2017-06-07 RX ADMIN — Medication 6 MG: at 22:29

## 2017-06-07 RX ADMIN — LOPERAMIDE HYDROCHLORIDE 4 MG: 1 SOLUTION ORAL at 16:48

## 2017-06-07 RX ADMIN — Medication 10 ML: at 20:02

## 2017-06-07 RX ADMIN — LOPERAMIDE HYDROCHLORIDE 4 MG: 1 SOLUTION ORAL at 13:06

## 2017-06-07 RX ADMIN — PANTOPRAZOLE SODIUM 40 MG: 40 TABLET, DELAYED RELEASE ORAL at 07:49

## 2017-06-07 RX ADMIN — Medication 2 G: at 14:08

## 2017-06-07 RX ADMIN — SIMETHICONE 40 MG: 20 SUSPENSION/ DROPS ORAL at 20:02

## 2017-06-07 RX ADMIN — Medication 6 MG: at 16:46

## 2017-06-07 RX ADMIN — Medication 10 ML: at 16:46

## 2017-06-07 RX ADMIN — TRAZODONE HYDROCHLORIDE 100 MG: 100 TABLET ORAL at 22:21

## 2017-06-07 RX ADMIN — OXYCODONE HYDROCHLORIDE 15 MG: 5 SOLUTION ORAL at 07:48

## 2017-06-07 RX ADMIN — GABAPENTIN 300 MG: 250 SOLUTION ORAL at 11:35

## 2017-06-07 RX ADMIN — CHLORHEXIDINE GLUCONATE 15 ML: 1.2 RINSE ORAL at 20:02

## 2017-06-07 RX ADMIN — PIPERACILLIN SODIUM,TAZOBACTAM SODIUM 3.38 G: 3; .375 INJECTION, POWDER, FOR SOLUTION INTRAVENOUS at 02:08

## 2017-06-07 RX ADMIN — METOPROLOL TARTRATE 25 MG: 100 TABLET ORAL at 07:49

## 2017-06-07 RX ADMIN — Medication 10 ML: at 08:06

## 2017-06-07 RX ADMIN — HEPARIN SODIUM 5000 UNITS: 5000 INJECTION, SOLUTION INTRAVENOUS; SUBCUTANEOUS at 05:12

## 2017-06-07 RX ADMIN — OXYCODONE HYDROCHLORIDE 15 MG: 5 SOLUTION ORAL at 01:02

## 2017-06-07 RX ADMIN — CHLORHEXIDINE GLUCONATE 15 ML: 1.2 RINSE ORAL at 07:51

## 2017-06-07 RX ADMIN — Medication 6 MG: at 10:12

## 2017-06-07 RX ADMIN — ENOXAPARIN SODIUM 40 MG: 40 INJECTION SUBCUTANEOUS at 16:46

## 2017-06-07 RX ADMIN — CHLORHEXIDINE GLUCONATE 15 ML: 1.2 RINSE ORAL at 14:07

## 2017-06-07 RX ADMIN — POTASSIUM CHLORIDE: 2 INJECTION, SOLUTION, CONCENTRATE INTRAVENOUS at 19:53

## 2017-06-07 RX ADMIN — OXYCODONE HYDROCHLORIDE 15 MG: 5 SOLUTION ORAL at 14:07

## 2017-06-07 RX ADMIN — I.V. FAT EMULSION 250 ML: 20 EMULSION INTRAVENOUS at 19:54

## 2017-06-07 RX ADMIN — HYDROMORPHONE HYDROCHLORIDE 0.5 MG: 1 INJECTION, SOLUTION INTRAMUSCULAR; INTRAVENOUS; SUBCUTANEOUS at 07:13

## 2017-06-07 RX ADMIN — METOPROLOL TARTRATE 25 MG: 100 TABLET ORAL at 20:02

## 2017-06-07 RX ADMIN — HYDROMORPHONE HYDROCHLORIDE 0.5 MG: 1 INJECTION, SOLUTION INTRAMUSCULAR; INTRAVENOUS; SUBCUTANEOUS at 12:58

## 2017-06-07 RX ADMIN — ACETAMINOPHEN 975 MG: 160 SUSPENSION ORAL at 20:01

## 2017-06-07 RX ADMIN — OXYCODONE HYDROCHLORIDE 15 MG: 5 SOLUTION ORAL at 11:22

## 2017-06-07 RX ADMIN — GABAPENTIN 300 MG: 250 SOLUTION ORAL at 03:59

## 2017-06-07 RX ADMIN — GABAPENTIN 300 MG: 250 SOLUTION ORAL at 20:02

## 2017-06-07 RX ADMIN — OXYCODONE HYDROCHLORIDE 15 MG: 5 SOLUTION ORAL at 20:01

## 2017-06-07 ASSESSMENT — PAIN DESCRIPTION - DESCRIPTORS: DESCRIPTORS: TENDER;THROBBING

## 2017-06-07 NOTE — PLAN OF CARE
Problem: Goal Outcome Summary  Goal: Goal Outcome Summary  Outcome: No Change  /74 (BP Location: Left arm)  Pulse 72  Temp 96.6  F (35.9  C) (Oral)  Resp 16  Ht 1.524 m (5')  Wt 43.8 kg (96 lb 9.6 oz)  SpO2 100%  Breastfeeding? No  BMI 18.87 kg/m2  Pt afebrile, vitals stable, pt c/o persistent pain especially at mid chest drain site- IV dilaudid and Oxycodone given per order. Pharyngostomy to LIWS with moderate output. J-tube with continuous tube feeds initially at 10 ml/hr and pt agreeable to increase rate to 20 ml/hr hence currently infusing at 20 ml/hr. Chest tube to gravity drainage- scant output. PICC line intact although leaking somewhat at dressing site- Vascular access changed the dressing and will change the dressing. Pt up to commode- voiding and having loose stools. Cont with plan of care.

## 2017-06-07 NOTE — PROGRESS NOTES
Thoracic surgery progress note    No acute events overnight. Pain at the chest tube site controlled. L jaw pain about the same. Denies nausea/vomiting. Continued diarrhea    Temp: 96  F (35.6  C) Temp  Min: 96  F (35.6  C)  Max: 97.4  F (36.3  C)  Resp: 16 Resp  Min: 16  Max: 16  SpO2: 96 % SpO2  Min: 96 %  Max: 100 %    No Data Recorded  Heart Rate: 69 Heart Rate  Min: 69  Max: 86  BP: 125/57 Systolic (24hrs), Av , Min:125 , Max:152   Diastolic (24hrs), Av, Min:57, Max:74    A&O, NAD  Unlabored breathing on RA  RRR  Pharygostomy tube site less swollen and less erythematous today. Tube to LIS w/ bilious drainage   Mediastinal drain to gravity w/ scant output  Chest wound granulating well. Minimal green staining on the dressing from inferior wound  Abd soft, non tender, non distended    I&O  /300  /100  Chest tube 0/0    Labs  WBC 11.6 -> 10.0      A/P: 72F w/ chronic esophageal perforation s/p Nissen at OSH, now s/p esophagectomy with gastric pull-up c/b recurrent anastomotic leak and mediastinal abscess s/p serial washouts, pharyngostomy tube and most recently EGD w/ stent placement on     - Wean off IV pain meds as able  - Strict NPO. C/w TPN and tube feeds as tolerated. Max doses of anti-motility agents. Will get AXR  - D/c Abx. Will recheck WBC on Friday  - Daily dressing changes  - Lovenox    Seen w/ thoracic team    Kd Liu MD  Surgery PGY1  [phone]

## 2017-06-07 NOTE — PROGRESS NOTES
CLINICAL NUTRITION SERVICES - REASSESSMENT NOTE     Nutrition Prescription    RECOMMENDATIONS FOR MDs/PROVIDERS TO ORDER:  --Modify TPN regimen as follows: @ 60 mL/hr with 80 gm AA, 185 gm dextrose, 200 mL 20% lipids 7x/wk to maximize calorie intake.   --Recommend scheduling mylicon q 4-6 hrs to reduce symptoms of GI distress (bloating, abdominal pain) as pt reports this has helped her tolerate higher TF rates.    Malnutrition Status:    Severe malnutrition in the context of acute on chronic illness    Recommendations already ordered by Registered Dietitian (RD):  --Modified TF orders: Via J-tube with TwoCal HN @ 35 ml/hr continuous (840 ml/day) to provide 1680 kcals (39 kcal/kg), 71 g PRO (1.7 gm/kg), 588 ml free H2O, 184 g CHO and 4 g Fiber daily. Continue fluid flushes of 30 mL q 4 hrs.     Future/Additional Recommendations:       EVALUATION OF THE PROGRESS TOWARD GOALS   Nutrition Support:  TF: Via J-tube with Two Mehrdad HN @ 50 mL/hr x  (7am-11pm) + Fluid Flushes (30 mL q 4hrs)   Intake: Given pt intermittently turns down/up TF rate depending on GI distress (abdominal discomfort/bloating and diarrhea), difficult to determine avg enteral infusion.  --Pt tolerated @ 30 mL/hr overnight on 6/5, but turned down to 10 mL/hr this afternoon.   --Per nursing, appears majority of time TF @ 15-20 mL/hr continuous over past 3 days, but was tolerating @ 25-30 mL/hr at the beginning of the week.  **Overall, suspect met avg of 20-25 mL/hr continuous, meeting 960-1200 kcal/day (22-28 kcal/kg) and 40-50 gm pro/day (0.9-1.2 gm/kg)    TPN: via PICC @ 60 mL/hr with 80 gm AA, 185 gm dextrose, 250 mL 20% lipids 5x/wk  Intake:  --TPN cycled overnight (in the event TPN becomes long-term POC) on 6/2  --TPN held on evening of 6/3 - 6/5 and PICC pulled due to leaking. Per PharmD, suspect leaking is related to pt anatomy as opposed to PICC placement.     --PICC replaced 6/4 evening and restarted 6/5 evening.   -- Infusing @ goal (60 mL/hr) x  "past 2 days with noted leaking.  **Overall, suspect met < 70% goal TPN infusion x past week, </= 914 kcal/day (21 kcal/kg) 56 gm pro/day (1.3 gm/kg)     NEW FINDINGS   --Pt continues to have diarrhea. She reports it is \"better\" during the daytime in comparison to overnight. She reports greatest barrier to advancing to goal TF rate is abdominal discomfort/bloating and diarrhea, but finds relief with mylicon.      MALNUTRITION  % Intake: < 75% for > 7 days (non-severe)  % Weight Loss: Weight loss does not meet criteria, suspected fluctuations in fluid status. Wt of 43.8 kg > admit wt (42.7 kg)  Subcutaneous Fat Loss: Facial region, Lower arm and Thoracic/intercostal:  Moderate-severe  Muscle Loss: Temporal, Facial & jaw region, Thoracic region (clavicle, acromium bone, deltoid, trapezius, pectoral) and Lower arm  (forearm):  Moderate  Fluid Accumulation/Edema: Mild BLE  Malnutrition Diagnosis: Severe malnutrition in the context of acute on chronic illness    Previous Goals   Total avg nutritional intake to meet a minimum of 30 kcal/kg (sole PN) vs 35 kcal/kg (EN or EN+PN) and 1.5 g PRO/kg daily (per dosing wt 43 kg).  Evaluation: Unable to evaluate given infusion interruptions, but suspect at least improving    Previous Nutrition Diagnosis  Inadequate protein-energy intake related to TF intolerances/interruptions and line access issues with TPN limiting nutrition intakes as evidenced by TF on hold x 2 days, 7-day avg TF intake =  15% kcal and PRO needs, and TPN started 3 days ago but held intermittently 2/2 line issues so dextrose advancement delays.  Evaluation: No change    CURRENT NUTRITION DIAGNOSIS  Inadequate protein-energy intake related to TF intolerances/interruptions and leaking line access issues limiting PN infusion as evidenced by continual diarrhea/abdominal bloating, avg TF rate infusion of 25 mL/hr continuously, TPN on hold x2 days.       INTERVENTIONS  Implementation  Collaboration with other " providers   Enteral Nutrition - Modify per above recs  Nutrition education: Discussed current nutrition POC with pt and educated pt of TPN. Offered emotional support.    Goals  Total avg nutritional intake to meet a minimum of 30 kcal/kg (soley from PN) or 35 kcal/kg (PN + EN) and 1.5 g PRO/kg daily (per dosing wt 43 kg).    Monitoring/Evaluation  Progress toward goals will be monitored and evaluated per protocol.    Rina Colunga  Registration Eligible   Pager: [phone]

## 2017-06-07 NOTE — PLAN OF CARE
Problem: Goal Outcome Summary  Goal: Goal Outcome Summary  Outcome: No Change  VSS,Mg 1.8 replaced ,pain partially controled with scheduled oxycodone and IV dilaudid.pt is alert and oriented cooperative att times pt appears to have increased anxiety level.chest dressing changed by MD this morning.Minimal serosanguinous drainage in drain bag coming from chest.Pharyngostomy  drainage tube connected to LIS , erythema  And edema noted at the site.site cleaned with wound  and bacitracin applied per order.pt continues to have diarrhea,adequate urinary output BLE+2 edema  noted.pt is up independently.Will continue to monitor.

## 2017-06-07 NOTE — PLAN OF CARE
Problem: Goal Outcome Summary  Goal: Goal Outcome Summary  OT 7B: Attempt x 2 today. Per RN, check back in PM as patient not feeling well. Upon PM check, pt reporting pain and fatigue declining therapy. Will reschedule for tomorrow.

## 2017-06-07 NOTE — PROGRESS NOTES
Due to pt having leakage from PICC site, we have changed dressing to gauze dressing. Pt PICC line is stitched in for securement. Gauze dressing to be changed every other day, however, with her situation we will be checking every day for drainage/leaking.

## 2017-06-07 NOTE — PLAN OF CARE
Problem: Goal Outcome Summary  Goal: Goal Outcome Summary  Outcome: No Change  Vital signs:  Temp: 96  F (35.6  C) Temp src: Oral BP: 125/57   Heart Rate: 69 Resp: 16 SpO2: 96 % O2 Device: None (Room air)   AVSS. Pt is alert and oriented, pt appears anxious at times. Pain controlled with scheduled oxycodone, tylenol, and dilaudid prn. Pharyngostomy to LIS, dressing changed by MD this morning. TPN and Lipids infusing via PICC. Tube feeding advanced from 20 to 30 ml/hr, goal rate is 50 ml/hr. G-tube to gravity. Zosyn administered per order. Up to commode independently. Voiding adequately. Pt continues to have loose stools. Continue POC.

## 2017-06-08 ENCOUNTER — APPOINTMENT (OUTPATIENT)
Dept: OCCUPATIONAL THERAPY | Facility: CLINIC | Age: 72
DRG: 856 | End: 2017-06-08
Attending: STUDENT IN AN ORGANIZED HEALTH CARE EDUCATION/TRAINING PROGRAM
Payer: MEDICARE

## 2017-06-08 PROCEDURE — 25000125 ZZHC RX 250: Performed by: STUDENT IN AN ORGANIZED HEALTH CARE EDUCATION/TRAINING PROGRAM

## 2017-06-08 PROCEDURE — 25000132 ZZH RX MED GY IP 250 OP 250 PS 637: Mod: GY | Performed by: SURGERY

## 2017-06-08 PROCEDURE — A9270 NON-COVERED ITEM OR SERVICE: HCPCS | Mod: GY | Performed by: SURGERY

## 2017-06-08 PROCEDURE — 40000133 ZZH STATISTIC OT WARD VISIT

## 2017-06-08 PROCEDURE — 27210429 ZZH NUTRITION PRODUCT INTERMEDIATE LITER

## 2017-06-08 PROCEDURE — 25000125 ZZHC RX 250: Performed by: PHYSICIAN ASSISTANT

## 2017-06-08 PROCEDURE — 25000132 ZZH RX MED GY IP 250 OP 250 PS 637: Mod: GY | Performed by: STUDENT IN AN ORGANIZED HEALTH CARE EDUCATION/TRAINING PROGRAM

## 2017-06-08 PROCEDURE — A9270 NON-COVERED ITEM OR SERVICE: HCPCS | Mod: GY | Performed by: STUDENT IN AN ORGANIZED HEALTH CARE EDUCATION/TRAINING PROGRAM

## 2017-06-08 PROCEDURE — 97110 THERAPEUTIC EXERCISES: CPT | Mod: GO

## 2017-06-08 PROCEDURE — 25000128 H RX IP 250 OP 636: Performed by: STUDENT IN AN ORGANIZED HEALTH CARE EDUCATION/TRAINING PROGRAM

## 2017-06-08 PROCEDURE — A9270 NON-COVERED ITEM OR SERVICE: HCPCS | Mod: GY | Performed by: PHYSICIAN ASSISTANT

## 2017-06-08 PROCEDURE — 40000558 ZZH STATISTIC CVC DRESSING CHANGE

## 2017-06-08 PROCEDURE — 12000003 ZZH R&B CRITICAL UMMC

## 2017-06-08 PROCEDURE — 25000132 ZZH RX MED GY IP 250 OP 250 PS 637: Mod: GY | Performed by: PHYSICIAN ASSISTANT

## 2017-06-08 RX ADMIN — HYDROMORPHONE HYDROCHLORIDE 0.5 MG: 1 INJECTION, SOLUTION INTRAMUSCULAR; INTRAVENOUS; SUBCUTANEOUS at 12:07

## 2017-06-08 RX ADMIN — Medication 6 MG: at 04:09

## 2017-06-08 RX ADMIN — Medication 10 ML: at 15:49

## 2017-06-08 RX ADMIN — SIMETHICONE 40 MG: 20 SUSPENSION/ DROPS ORAL at 07:40

## 2017-06-08 RX ADMIN — BACITRACIN ZINC: 500 OINTMENT TOPICAL at 07:44

## 2017-06-08 RX ADMIN — MINERAL SUPPLEMENT IRON 300 MG / 5 ML STRENGTH LIQUID 100 PER BOX UNFLAVORED 300 MG: at 07:41

## 2017-06-08 RX ADMIN — HYDROMORPHONE HYDROCHLORIDE 0.5 MG: 1 INJECTION, SOLUTION INTRAMUSCULAR; INTRAVENOUS; SUBCUTANEOUS at 00:43

## 2017-06-08 RX ADMIN — CHLORHEXIDINE GLUCONATE 15 ML: 1.2 RINSE ORAL at 07:41

## 2017-06-08 RX ADMIN — LOPERAMIDE HYDROCHLORIDE 4 MG: 1 SOLUTION ORAL at 07:42

## 2017-06-08 RX ADMIN — Medication 10 ML: at 20:06

## 2017-06-08 RX ADMIN — OXYCODONE HYDROCHLORIDE 15 MG: 5 SOLUTION ORAL at 02:04

## 2017-06-08 RX ADMIN — PANTOPRAZOLE SODIUM 40 MG: 40 TABLET, DELAYED RELEASE ORAL at 07:41

## 2017-06-08 RX ADMIN — SIMETHICONE 40 MG: 20 SUSPENSION/ DROPS ORAL at 15:48

## 2017-06-08 RX ADMIN — HYDROMORPHONE HYDROCHLORIDE 0.5 MG: 1 INJECTION, SOLUTION INTRAMUSCULAR; INTRAVENOUS; SUBCUTANEOUS at 15:48

## 2017-06-08 RX ADMIN — Medication 6 MG: at 22:01

## 2017-06-08 RX ADMIN — POTASSIUM CHLORIDE: 2 INJECTION, SOLUTION, CONCENTRATE INTRAVENOUS at 20:21

## 2017-06-08 RX ADMIN — OXYCODONE HYDROCHLORIDE 15 MG: 5 SOLUTION ORAL at 05:15

## 2017-06-08 RX ADMIN — GABAPENTIN 300 MG: 250 SOLUTION ORAL at 12:08

## 2017-06-08 RX ADMIN — Medication 1 PACKET: at 12:27

## 2017-06-08 RX ADMIN — SIMETHICONE 40 MG: 20 SUSPENSION/ DROPS ORAL at 04:09

## 2017-06-08 RX ADMIN — OXYCODONE HYDROCHLORIDE 15 MG: 5 SOLUTION ORAL at 20:06

## 2017-06-08 RX ADMIN — Medication 1 PACKET: at 22:01

## 2017-06-08 RX ADMIN — METOPROLOL TARTRATE 25 MG: 100 TABLET ORAL at 07:42

## 2017-06-08 RX ADMIN — HYDROMORPHONE HYDROCHLORIDE 0.5 MG: 1 INJECTION, SOLUTION INTRAMUSCULAR; INTRAVENOUS; SUBCUTANEOUS at 06:41

## 2017-06-08 RX ADMIN — ACETAMINOPHEN 975 MG: 160 SUSPENSION ORAL at 04:08

## 2017-06-08 RX ADMIN — Medication 10 ML: at 07:52

## 2017-06-08 RX ADMIN — ENOXAPARIN SODIUM 40 MG: 40 INJECTION SUBCUTANEOUS at 12:08

## 2017-06-08 RX ADMIN — OXYCODONE HYDROCHLORIDE 15 MG: 5 SOLUTION ORAL at 23:12

## 2017-06-08 RX ADMIN — SIMETHICONE 40 MG: 20 SUSPENSION/ DROPS ORAL at 12:08

## 2017-06-08 RX ADMIN — LOPERAMIDE HYDROCHLORIDE 4 MG: 1 SOLUTION ORAL at 20:06

## 2017-06-08 RX ADMIN — OXYCODONE HYDROCHLORIDE 15 MG: 5 SOLUTION ORAL at 14:18

## 2017-06-08 RX ADMIN — BACITRACIN ZINC: 500 OINTMENT TOPICAL at 20:46

## 2017-06-08 RX ADMIN — ACETAMINOPHEN 975 MG: 160 SUSPENSION ORAL at 12:07

## 2017-06-08 RX ADMIN — Medication 6 MG: at 15:49

## 2017-06-08 RX ADMIN — LOPERAMIDE HYDROCHLORIDE 4 MG: 1 SOLUTION ORAL at 15:50

## 2017-06-08 RX ADMIN — HYDROMORPHONE HYDROCHLORIDE 0.5 MG: 1 INJECTION, SOLUTION INTRAMUSCULAR; INTRAVENOUS; SUBCUTANEOUS at 19:03

## 2017-06-08 RX ADMIN — SIMETHICONE 40 MG: 20 SUSPENSION/ DROPS ORAL at 00:44

## 2017-06-08 RX ADMIN — SIMETHICONE 40 MG: 20 SUSPENSION/ DROPS ORAL at 20:07

## 2017-06-08 RX ADMIN — CHLORHEXIDINE GLUCONATE 15 ML: 1.2 RINSE ORAL at 14:18

## 2017-06-08 RX ADMIN — Medication 6 MG: at 10:51

## 2017-06-08 RX ADMIN — OXYCODONE HYDROCHLORIDE 15 MG: 5 SOLUTION ORAL at 17:26

## 2017-06-08 RX ADMIN — GABAPENTIN 300 MG: 250 SOLUTION ORAL at 20:07

## 2017-06-08 RX ADMIN — CHLORHEXIDINE GLUCONATE 15 ML: 1.2 RINSE ORAL at 20:22

## 2017-06-08 RX ADMIN — LOPERAMIDE HYDROCHLORIDE 4 MG: 1 SOLUTION ORAL at 12:07

## 2017-06-08 RX ADMIN — Medication 10 ML: at 12:07

## 2017-06-08 RX ADMIN — OXYCODONE HYDROCHLORIDE 15 MG: 5 SOLUTION ORAL at 10:51

## 2017-06-08 RX ADMIN — GABAPENTIN 300 MG: 250 SOLUTION ORAL at 04:09

## 2017-06-08 RX ADMIN — OXYCODONE HYDROCHLORIDE 15 MG: 5 SOLUTION ORAL at 07:47

## 2017-06-08 RX ADMIN — TRAZODONE HYDROCHLORIDE 100 MG: 100 TABLET ORAL at 22:01

## 2017-06-08 RX ADMIN — ACETAMINOPHEN 975 MG: 160 SUSPENSION ORAL at 20:06

## 2017-06-08 RX ADMIN — LIDOCAINE 1 PATCH: 50 PATCH TOPICAL at 07:43

## 2017-06-08 RX ADMIN — I.V. FAT EMULSION 200 ML: 20 EMULSION INTRAVENOUS at 20:18

## 2017-06-08 RX ADMIN — HYDROMORPHONE HYDROCHLORIDE 0.5 MG: 1 INJECTION, SOLUTION INTRAMUSCULAR; INTRAVENOUS; SUBCUTANEOUS at 09:37

## 2017-06-08 RX ADMIN — METOPROLOL TARTRATE 25 MG: 100 TABLET ORAL at 20:06

## 2017-06-08 ASSESSMENT — PAIN DESCRIPTION - DESCRIPTORS: DESCRIPTORS: CONSTANT;SHARP;TENDER

## 2017-06-08 NOTE — PLAN OF CARE
Problem: Goal Outcome Summary  Goal: Goal Outcome Summary  OT 7B: Pt independent for bed mobility, toilet transfer to BS, clothing management, and pericares following toileting task. Pt completing 250 feet functional mobility to therapy gym to engage in aerobic activity on treadmill and facilitate functional endurance. Upon starting treadmill at 0.5 mph, pt becoming anxious and tearful limiting ability to participate in activity. Pt unable to verbalize reason for anxiety. Time taken to educate patient on relaxation strategies to facilitate coping with anxiety. Pt limited by anxiety, strength, sternal precautions, pain, and activity tolerance. Rec: home with assist and resumption of  services.

## 2017-06-08 NOTE — PLAN OF CARE
Problem: Individualization  Goal: Patient Preferences  Outcome: No Change  Vitals:     06/07/17 0800 06/07/17 1550 06/07/17 2239 06/08/17 0313   BP: 120/54 135/58 114/45 117/66   BP Location: Left arm Left arm Left arm Left arm   Cuff Size:           Pulse:       90   Resp: 18 18 18 18   Temp: 97.4  F (36.3  C) 96.2  F (35.7  C) 96.6  F (35.9  C) 97.3  F (36.3  C)   TempSrc: Oral Oral Oral Oral   SpO2: 97% 100% 99% 98%   Weight:           Height:           Patient with 2 loose stools plus leaking in brief.  Voiding per commode in adequate amts.  Pharyngostomy tube to LIS, pulled 100cc green drainage, insertion site red.  Chest drain pulling scant red/orange drainage.  TF from 10-20cc/hr.  Fair pain relief with scheduled oxycodone and prn dilaudid.  MD's changed chest dressing this am.  Continue with POC.

## 2017-06-08 NOTE — PLAN OF CARE
Problem: Goal Outcome Summary  Goal: Goal Outcome Summary  Outcome: No Change  Vitals:     06/07/17 1550 06/07/17 2239 06/08/17 0313 06/08/17 0700   BP: 135/58 114/45 117/66 142/58   BP Location: Left arm Left arm Left arm Left arm   Cuff Size:           Pulse:     90 83   Resp: 18 18 18 18   Temp: 96.2  F (35.7  C) 96.6  F (35.9  C) 97.3  F (36.3  C) 96.8  F (36  C)   TempSrc: Oral Oral Oral Oral   SpO2: 100% 99% 98% 99%   Weight:           Height:             Afeb, VSS, pain at tolerable level with prn pain med & reported to have more pain with dressing changes. Loose watery stools & voiding adequately. Pharyngostomy with greenish drainage & to LIS. Reddish around the tube, cleansed and bacitracin applied. Mid chest dressing intact & team changed dressing this morning. TF at 25 cc/hr now & TPN at 60 cc/hr. Pt up walking with PT in hallway now. Continue POC.

## 2017-06-08 NOTE — PLAN OF CARE
Problem: Goal Outcome Summary  Goal: Goal Outcome Summary  Vitals:     06/06/17 2234 06/07/17 0800 06/07/17 1550 06/07/17 2239   BP: 125/57 120/54 135/58 114/45   BP Location: Left arm Left arm Left arm Left arm   Cuff Size:           Pulse:           Resp: 16 18 18 18   Temp: 96  F (35.6  C) 97.4  F (36.3  C) 96.2  F (35.7  C) 96.6  F (35.9  C)   TempSrc: Oral Oral Oral Oral   SpO2: 96% 97% 100% 99%   Weight:           Height:             Pt is alert.Pharyngostomy tube to LIS.Chest tube drain to gravity.TPN and Lipids running through PICC line.Tube feeding running 20 ml/hr. Still having loose stools.Chest tube dressing intact.Will monitor.

## 2017-06-08 NOTE — PROGRESS NOTES
Thoracic Surgery Progress Note  06/08/17    No acute events overnight. Continues to have pain associated with the draining chest tube. As well as pain at her pharyngostomy tube site. Diarrhea may be slightly improved improved with the discontinuation of antibiotics. TF running at 20 this AM.     /66 (BP Location: Left arm)  Pulse 90  Temp 97.3  F (36.3  C) (Oral)  Resp 18  Ht 1.524 m (5')  Wt 43.8 kg (96 lb 9.6 oz)  SpO2 98%  Breastfeeding? No  BMI 18.87 kg/m2  Laying comfortably in bed in no acute distress  Awake, alert and appropriate  Non-labored breathing on room air  Pharyngostomy tube in place, minor swelling around insertion site.   Regular rate and rhythm  Abdomen soft, non-tender. Feeding tube in place.   Chest dressing replaced- some purulent/murky discharge at base, otherwise good granulation tissue and healing well.   Extremities warm, non-edematous    72 year old female with chronic esophageal perforation status post a Nissen at an outside hospital, now status post an esophagectomy with gastric pull-up which was complicated by a recurrent anastomotic leak and mediastinal abscess status post serial washouts, pharyngostomy tube placement and most recently and EGD with stent placement on 6/4, doing well.     Continue TPN and tube feeds as tolerated. Will hope that can titrate up on tube feeds if diarrhea allows.   Daily bed-side dressing changes.   Completed course of antibiotics with fluconazole/zosyn from 5/18-6/7.   Continue draining chest tube (not pleural) to gravity  Continue pharyngostomy tube to LIWS  Continue aggressive anti-diarrheal medications.   DVT prophylaxis with Lovenox  GI prophylaxis with protonix    Discussed with fellow.     Viki Carmen MD  General Surgery Resident  Pager: [phone]

## 2017-06-09 ENCOUNTER — APPOINTMENT (OUTPATIENT)
Dept: OCCUPATIONAL THERAPY | Facility: CLINIC | Age: 72
DRG: 856 | End: 2017-06-09
Attending: STUDENT IN AN ORGANIZED HEALTH CARE EDUCATION/TRAINING PROGRAM
Payer: MEDICARE

## 2017-06-09 LAB
ANION GAP SERPL CALCULATED.3IONS-SCNC: 10 MMOL/L (ref 3–14)
BUN SERPL-MCNC: 18 MG/DL (ref 7–30)
CALCIUM SERPL-MCNC: 8.8 MG/DL (ref 8.5–10.1)
CHLORIDE SERPL-SCNC: 102 MMOL/L (ref 94–109)
CO2 SERPL-SCNC: 22 MMOL/L (ref 20–32)
CREAT SERPL-MCNC: 0.34 MG/DL (ref 0.52–1.04)
ERYTHROCYTE [DISTWIDTH] IN BLOOD BY AUTOMATED COUNT: 19.6 % (ref 10–15)
GFR SERPL CREATININE-BSD FRML MDRD: ABNORMAL ML/MIN/1.7M2
GLUCOSE BLDC GLUCOMTR-MCNC: 103 MG/DL (ref 70–99)
GLUCOSE SERPL-MCNC: 132 MG/DL (ref 70–99)
HCT VFR BLD AUTO: 31.1 % (ref 35–47)
HGB BLD-MCNC: 9.3 G/DL (ref 11.7–15.7)
MAGNESIUM SERPL-MCNC: 1.6 MG/DL (ref 1.6–2.3)
MCH RBC QN AUTO: 29 PG (ref 26.5–33)
MCHC RBC AUTO-ENTMCNC: 29.9 G/DL (ref 31.5–36.5)
MCV RBC AUTO: 97 FL (ref 78–100)
PHOSPHATE SERPL-MCNC: 3.9 MG/DL (ref 2.5–4.5)
PLATELET # BLD AUTO: 583 10E9/L (ref 150–450)
POTASSIUM SERPL-SCNC: 4.2 MMOL/L (ref 3.4–5.3)
RBC # BLD AUTO: 3.21 10E12/L (ref 3.8–5.2)
SODIUM SERPL-SCNC: 135 MMOL/L (ref 133–144)
TRIGL SERPL-MCNC: 407 MG/DL
WBC # BLD AUTO: 11.6 10E9/L (ref 4–11)

## 2017-06-09 PROCEDURE — 25000125 ZZHC RX 250: Performed by: PHYSICIAN ASSISTANT

## 2017-06-09 PROCEDURE — 0HD5XZZ EXTRACTION OF CHEST SKIN, EXTERNAL APPROACH: ICD-10-PCS | Performed by: THORACIC SURGERY (CARDIOTHORACIC VASCULAR SURGERY)

## 2017-06-09 PROCEDURE — 84478 ASSAY OF TRIGLYCERIDES: CPT | Performed by: STUDENT IN AN ORGANIZED HEALTH CARE EDUCATION/TRAINING PROGRAM

## 2017-06-09 PROCEDURE — A9270 NON-COVERED ITEM OR SERVICE: HCPCS | Mod: GY | Performed by: STUDENT IN AN ORGANIZED HEALTH CARE EDUCATION/TRAINING PROGRAM

## 2017-06-09 PROCEDURE — 27210794 ZZH OR GENERAL SUPPLY STERILE: Performed by: THORACIC SURGERY (CARDIOTHORACIC VASCULAR SURGERY)

## 2017-06-09 PROCEDURE — 83735 ASSAY OF MAGNESIUM: CPT | Performed by: STUDENT IN AN ORGANIZED HEALTH CARE EDUCATION/TRAINING PROGRAM

## 2017-06-09 PROCEDURE — 25000128 H RX IP 250 OP 636: Performed by: STUDENT IN AN ORGANIZED HEALTH CARE EDUCATION/TRAINING PROGRAM

## 2017-06-09 PROCEDURE — 25000125 ZZHC RX 250: Performed by: NURSE ANESTHETIST, CERTIFIED REGISTERED

## 2017-06-09 PROCEDURE — 40000133 ZZH STATISTIC OT WARD VISIT: Performed by: OCCUPATIONAL THERAPIST

## 2017-06-09 PROCEDURE — 36000057 ZZH SURGERY LEVEL 3 1ST 30 MIN - UMMC: Performed by: THORACIC SURGERY (CARDIOTHORACIC VASCULAR SURGERY)

## 2017-06-09 PROCEDURE — 25000125 ZZHC RX 250: Performed by: SURGERY

## 2017-06-09 PROCEDURE — 37000008 ZZH ANESTHESIA TECHNICAL FEE, 1ST 30 MIN: Performed by: THORACIC SURGERY (CARDIOTHORACIC VASCULAR SURGERY)

## 2017-06-09 PROCEDURE — 25000125 ZZHC RX 250: Performed by: ANESTHESIOLOGY

## 2017-06-09 PROCEDURE — 80048 BASIC METABOLIC PNL TOTAL CA: CPT | Performed by: STUDENT IN AN ORGANIZED HEALTH CARE EDUCATION/TRAINING PROGRAM

## 2017-06-09 PROCEDURE — 99207 ZZC CDG-CORRECTLY CODED, REVIEWED AND AGREE: CPT | Performed by: INTERNAL MEDICINE

## 2017-06-09 PROCEDURE — 85027 COMPLETE CBC AUTOMATED: CPT | Performed by: STUDENT IN AN ORGANIZED HEALTH CARE EDUCATION/TRAINING PROGRAM

## 2017-06-09 PROCEDURE — 25000132 ZZH RX MED GY IP 250 OP 250 PS 637: Mod: GY | Performed by: PHYSICIAN ASSISTANT

## 2017-06-09 PROCEDURE — 25000128 H RX IP 250 OP 636: Performed by: SURGERY

## 2017-06-09 PROCEDURE — A9270 NON-COVERED ITEM OR SERVICE: HCPCS | Mod: GY | Performed by: PHYSICIAN ASSISTANT

## 2017-06-09 PROCEDURE — 25000125 ZZHC RX 250: Performed by: STUDENT IN AN ORGANIZED HEALTH CARE EDUCATION/TRAINING PROGRAM

## 2017-06-09 PROCEDURE — C9399 UNCLASSIFIED DRUGS OR BIOLOG: HCPCS | Performed by: NURSE ANESTHETIST, CERTIFIED REGISTERED

## 2017-06-09 PROCEDURE — 40000170 ZZH STATISTIC PRE-PROCEDURE ASSESSMENT II: Performed by: THORACIC SURGERY (CARDIOTHORACIC VASCULAR SURGERY)

## 2017-06-09 PROCEDURE — 0DJ08ZZ INSPECTION OF UPPER INTESTINAL TRACT, VIA NATURAL OR ARTIFICIAL OPENING ENDOSCOPIC: ICD-10-PCS | Performed by: THORACIC SURGERY (CARDIOTHORACIC VASCULAR SURGERY)

## 2017-06-09 PROCEDURE — 25000132 ZZH RX MED GY IP 250 OP 250 PS 637: Mod: GY | Performed by: STUDENT IN AN ORGANIZED HEALTH CARE EDUCATION/TRAINING PROGRAM

## 2017-06-09 PROCEDURE — 71000015 ZZH RECOVERY PHASE 1 LEVEL 2 EA ADDTL HR: Performed by: THORACIC SURGERY (CARDIOTHORACIC VASCULAR SURGERY)

## 2017-06-09 PROCEDURE — 25000128 H RX IP 250 OP 636: Performed by: NURSE ANESTHETIST, CERTIFIED REGISTERED

## 2017-06-09 PROCEDURE — 00000146 ZZHCL STATISTIC GLUCOSE BY METER IP

## 2017-06-09 PROCEDURE — 84100 ASSAY OF PHOSPHORUS: CPT | Performed by: STUDENT IN AN ORGANIZED HEALTH CARE EDUCATION/TRAINING PROGRAM

## 2017-06-09 PROCEDURE — 71000014 ZZH RECOVERY PHASE 1 LEVEL 2 FIRST HR: Performed by: THORACIC SURGERY (CARDIOTHORACIC VASCULAR SURGERY)

## 2017-06-09 PROCEDURE — 99223 1ST HOSP IP/OBS HIGH 75: CPT | Mod: GC | Performed by: INTERNAL MEDICINE

## 2017-06-09 PROCEDURE — 12000003 ZZH R&B CRITICAL UMMC

## 2017-06-09 PROCEDURE — 36592 COLLECT BLOOD FROM PICC: CPT | Performed by: STUDENT IN AN ORGANIZED HEALTH CARE EDUCATION/TRAINING PROGRAM

## 2017-06-09 PROCEDURE — 97535 SELF CARE MNGMENT TRAINING: CPT | Mod: GO | Performed by: OCCUPATIONAL THERAPIST

## 2017-06-09 PROCEDURE — 36000059 ZZH SURGERY LEVEL 3 EA 15 ADDTL MIN UMMC: Performed by: THORACIC SURGERY (CARDIOTHORACIC VASCULAR SURGERY)

## 2017-06-09 PROCEDURE — 37000009 ZZH ANESTHESIA TECHNICAL FEE, EACH ADDTL 15 MIN: Performed by: THORACIC SURGERY (CARDIOTHORACIC VASCULAR SURGERY)

## 2017-06-09 PROCEDURE — 25000128 H RX IP 250 OP 636: Performed by: ANESTHESIOLOGY

## 2017-06-09 RX ORDER — NALOXONE HYDROCHLORIDE 0.4 MG/ML
.1-.4 INJECTION, SOLUTION INTRAMUSCULAR; INTRAVENOUS; SUBCUTANEOUS
Status: DISCONTINUED | OUTPATIENT
Start: 2017-06-09 | End: 2017-06-09 | Stop reason: HOSPADM

## 2017-06-09 RX ORDER — PROPOFOL 10 MG/ML
INJECTION, EMULSION INTRAVENOUS CONTINUOUS PRN
Status: DISCONTINUED | OUTPATIENT
Start: 2017-06-09 | End: 2017-06-09

## 2017-06-09 RX ORDER — ONDANSETRON 2 MG/ML
4 INJECTION INTRAMUSCULAR; INTRAVENOUS EVERY 30 MIN PRN
Status: DISCONTINUED | OUTPATIENT
Start: 2017-06-09 | End: 2017-06-09 | Stop reason: HOSPADM

## 2017-06-09 RX ORDER — LIDOCAINE HYDROCHLORIDE 20 MG/ML
INJECTION, SOLUTION INFILTRATION; PERINEURAL PRN
Status: DISCONTINUED | OUTPATIENT
Start: 2017-06-09 | End: 2017-06-09

## 2017-06-09 RX ORDER — LABETALOL HYDROCHLORIDE 5 MG/ML
10 INJECTION, SOLUTION INTRAVENOUS
Status: DISCONTINUED | OUTPATIENT
Start: 2017-06-09 | End: 2017-06-09 | Stop reason: HOSPADM

## 2017-06-09 RX ORDER — ALBUTEROL SULFATE 0.83 MG/ML
2.5 SOLUTION RESPIRATORY (INHALATION) EVERY 4 HOURS PRN
Status: DISCONTINUED | OUTPATIENT
Start: 2017-06-09 | End: 2017-06-09 | Stop reason: HOSPADM

## 2017-06-09 RX ORDER — FENTANYL CITRATE 50 UG/ML
25-50 INJECTION, SOLUTION INTRAMUSCULAR; INTRAVENOUS
Status: DISCONTINUED | OUTPATIENT
Start: 2017-06-09 | End: 2017-06-09 | Stop reason: HOSPADM

## 2017-06-09 RX ORDER — FENTANYL 50 UG/1
50 PATCH TRANSDERMAL
Status: DISCONTINUED | OUTPATIENT
Start: 2017-06-09 | End: 2017-06-12

## 2017-06-09 RX ORDER — SODIUM CHLORIDE, SODIUM LACTATE, POTASSIUM CHLORIDE, CALCIUM CHLORIDE 600; 310; 30; 20 MG/100ML; MG/100ML; MG/100ML; MG/100ML
INJECTION, SOLUTION INTRAVENOUS CONTINUOUS
Status: DISCONTINUED | OUTPATIENT
Start: 2017-06-09 | End: 2017-06-09

## 2017-06-09 RX ORDER — HYDRALAZINE HYDROCHLORIDE 20 MG/ML
2.5-5 INJECTION INTRAMUSCULAR; INTRAVENOUS EVERY 10 MIN PRN
Status: DISCONTINUED | OUTPATIENT
Start: 2017-06-09 | End: 2017-06-09 | Stop reason: HOSPADM

## 2017-06-09 RX ORDER — ONDANSETRON 4 MG/1
4 TABLET, ORALLY DISINTEGRATING ORAL EVERY 30 MIN PRN
Status: DISCONTINUED | OUTPATIENT
Start: 2017-06-09 | End: 2017-06-09 | Stop reason: HOSPADM

## 2017-06-09 RX ORDER — ONDANSETRON 2 MG/ML
INJECTION INTRAMUSCULAR; INTRAVENOUS PRN
Status: DISCONTINUED | OUTPATIENT
Start: 2017-06-09 | End: 2017-06-09

## 2017-06-09 RX ORDER — FENTANYL CITRATE 50 UG/ML
25-50 INJECTION, SOLUTION INTRAMUSCULAR; INTRAVENOUS
Status: DISCONTINUED | OUTPATIENT
Start: 2017-06-09 | End: 2017-06-09

## 2017-06-09 RX ORDER — FENTANYL CITRATE 50 UG/ML
INJECTION, SOLUTION INTRAMUSCULAR; INTRAVENOUS PRN
Status: DISCONTINUED | OUTPATIENT
Start: 2017-06-09 | End: 2017-06-09

## 2017-06-09 RX ORDER — SODIUM CHLORIDE, SODIUM LACTATE, POTASSIUM CHLORIDE, CALCIUM CHLORIDE 600; 310; 30; 20 MG/100ML; MG/100ML; MG/100ML; MG/100ML
INJECTION, SOLUTION INTRAVENOUS CONTINUOUS
Status: DISCONTINUED | OUTPATIENT
Start: 2017-06-09 | End: 2017-06-09 | Stop reason: HOSPADM

## 2017-06-09 RX ORDER — MEPERIDINE HYDROCHLORIDE 25 MG/ML
12.5 INJECTION INTRAMUSCULAR; INTRAVENOUS; SUBCUTANEOUS
Status: DISCONTINUED | OUTPATIENT
Start: 2017-06-09 | End: 2017-06-09 | Stop reason: HOSPADM

## 2017-06-09 RX ORDER — CEFAZOLIN SODIUM 2 G/100ML
2 INJECTION, SOLUTION INTRAVENOUS ONCE
Status: COMPLETED | OUTPATIENT
Start: 2017-06-09 | End: 2017-06-09

## 2017-06-09 RX ORDER — PROPOFOL 10 MG/ML
INJECTION, EMULSION INTRAVENOUS PRN
Status: DISCONTINUED | OUTPATIENT
Start: 2017-06-09 | End: 2017-06-09

## 2017-06-09 RX ORDER — OXYCODONE HCL 5 MG/5 ML
10-15 SOLUTION, ORAL ORAL
Status: DISCONTINUED | OUTPATIENT
Start: 2017-06-09 | End: 2017-06-13

## 2017-06-09 RX ADMIN — SIMETHICONE 40 MG: 20 SUSPENSION/ DROPS ORAL at 11:29

## 2017-06-09 RX ADMIN — SIMETHICONE 40 MG: 20 SUSPENSION/ DROPS ORAL at 00:34

## 2017-06-09 RX ADMIN — SODIUM CHLORIDE, POTASSIUM CHLORIDE, SODIUM LACTATE AND CALCIUM CHLORIDE: 600; 310; 30; 20 INJECTION, SOLUTION INTRAVENOUS at 08:21

## 2017-06-09 RX ADMIN — ROCURONIUM BROMIDE 20 MG: 10 INJECTION INTRAVENOUS at 08:46

## 2017-06-09 RX ADMIN — Medication 10 ML: at 16:30

## 2017-06-09 RX ADMIN — CEFAZOLIN SODIUM 2 G: 2 INJECTION, SOLUTION INTRAVENOUS at 08:43

## 2017-06-09 RX ADMIN — CHLORHEXIDINE GLUCONATE 15 ML: 1.2 RINSE ORAL at 14:23

## 2017-06-09 RX ADMIN — SUCCINYLCHOLINE CHLORIDE 80 MG: 20 INJECTION, SOLUTION INTRAMUSCULAR; INTRAVENOUS at 08:29

## 2017-06-09 RX ADMIN — LIDOCAINE HYDROCHLORIDE 80 MG: 20 INJECTION, SOLUTION INFILTRATION; PERINEURAL at 08:29

## 2017-06-09 RX ADMIN — SIMETHICONE 40 MG: 20 SUSPENSION/ DROPS ORAL at 04:14

## 2017-06-09 RX ADMIN — GABAPENTIN 300 MG: 250 SOLUTION ORAL at 11:29

## 2017-06-09 RX ADMIN — OXYCODONE HYDROCHLORIDE 15 MG: 5 SOLUTION ORAL at 05:02

## 2017-06-09 RX ADMIN — MIDAZOLAM HYDROCHLORIDE 1 MG: 1 INJECTION, SOLUTION INTRAMUSCULAR; INTRAVENOUS at 08:32

## 2017-06-09 RX ADMIN — PROPOFOL 150 MCG/KG/MIN: 10 INJECTION, EMULSION INTRAVENOUS at 08:40

## 2017-06-09 RX ADMIN — POTASSIUM CHLORIDE: 2 INJECTION, SOLUTION, CONCENTRATE INTRAVENOUS at 20:57

## 2017-06-09 RX ADMIN — GABAPENTIN 300 MG: 250 SOLUTION ORAL at 21:12

## 2017-06-09 RX ADMIN — Medication 6 MG: at 16:30

## 2017-06-09 RX ADMIN — PROPOFOL 40 MG: 10 INJECTION, EMULSION INTRAVENOUS at 08:50

## 2017-06-09 RX ADMIN — HYDROMORPHONE HYDROCHLORIDE 0.5 MG: 1 INJECTION, SOLUTION INTRAMUSCULAR; INTRAVENOUS; SUBCUTANEOUS at 10:52

## 2017-06-09 RX ADMIN — HYDROMORPHONE HYDROCHLORIDE 0.5 MG: 1 INJECTION, SOLUTION INTRAMUSCULAR; INTRAVENOUS; SUBCUTANEOUS at 16:31

## 2017-06-09 RX ADMIN — SIMETHICONE 40 MG: 20 SUSPENSION/ DROPS ORAL at 16:31

## 2017-06-09 RX ADMIN — FENTANYL CITRATE 50 MCG: 50 INJECTION, SOLUTION INTRAMUSCULAR; INTRAVENOUS at 08:47

## 2017-06-09 RX ADMIN — FENTANYL 1 PATCH: 50 PATCH, EXTENDED RELEASE TRANSDERMAL at 20:52

## 2017-06-09 RX ADMIN — HYDROMORPHONE HYDROCHLORIDE 0.5 MG: 1 INJECTION, SOLUTION INTRAMUSCULAR; INTRAVENOUS; SUBCUTANEOUS at 06:43

## 2017-06-09 RX ADMIN — OXYCODONE HYDROCHLORIDE 15 MG: 5 SOLUTION ORAL at 02:09

## 2017-06-09 RX ADMIN — PROPOFOL 80 MG: 10 INJECTION, EMULSION INTRAVENOUS at 08:29

## 2017-06-09 RX ADMIN — PROPOFOL 30 MG: 10 INJECTION, EMULSION INTRAVENOUS at 08:45

## 2017-06-09 RX ADMIN — LOPERAMIDE HYDROCHLORIDE 4 MG: 1 SOLUTION ORAL at 16:30

## 2017-06-09 RX ADMIN — LOPERAMIDE HYDROCHLORIDE 4 MG: 1 SOLUTION ORAL at 11:28

## 2017-06-09 RX ADMIN — SODIUM CHLORIDE, PRESERVATIVE FREE 5 ML: 5 INJECTION INTRAVENOUS at 17:40

## 2017-06-09 RX ADMIN — TRAZODONE HYDROCHLORIDE 100 MG: 100 TABLET ORAL at 21:26

## 2017-06-09 RX ADMIN — FENTANYL CITRATE 25 MCG: 50 INJECTION INTRAMUSCULAR; INTRAVENOUS at 09:37

## 2017-06-09 RX ADMIN — CHLORHEXIDINE GLUCONATE 15 ML: 1.2 RINSE ORAL at 21:12

## 2017-06-09 RX ADMIN — METOPROLOL TARTRATE 25 MG: 100 TABLET ORAL at 21:12

## 2017-06-09 RX ADMIN — LOPERAMIDE HYDROCHLORIDE 4 MG: 1 SOLUTION ORAL at 21:12

## 2017-06-09 RX ADMIN — Medication 6 MG: at 21:37

## 2017-06-09 RX ADMIN — I.V. FAT EMULSION 200 ML: 20 EMULSION INTRAVENOUS at 20:57

## 2017-06-09 RX ADMIN — ACETAMINOPHEN 975 MG: 160 SUSPENSION ORAL at 20:44

## 2017-06-09 RX ADMIN — FENTANYL CITRATE 25 MCG: 50 INJECTION INTRAMUSCULAR; INTRAVENOUS at 10:07

## 2017-06-09 RX ADMIN — BACITRACIN ZINC: 500 OINTMENT TOPICAL at 21:38

## 2017-06-09 RX ADMIN — OXYCODONE HYDROCHLORIDE 15 MG: 5 SOLUTION ORAL at 20:44

## 2017-06-09 RX ADMIN — Medication 6 MG: at 04:14

## 2017-06-09 RX ADMIN — GABAPENTIN 300 MG: 250 SOLUTION ORAL at 04:14

## 2017-06-09 RX ADMIN — OXYCODONE HYDROCHLORIDE 15 MG: 5 SOLUTION ORAL at 11:26

## 2017-06-09 RX ADMIN — SIMETHICONE 40 MG: 20 SUSPENSION/ DROPS ORAL at 21:12

## 2017-06-09 RX ADMIN — HYDROMORPHONE HYDROCHLORIDE 0.5 MG: 1 INJECTION, SOLUTION INTRAMUSCULAR; INTRAVENOUS; SUBCUTANEOUS at 22:36

## 2017-06-09 RX ADMIN — ONDANSETRON 4 MG: 2 INJECTION INTRAMUSCULAR; INTRAVENOUS at 08:55

## 2017-06-09 RX ADMIN — SODIUM CHLORIDE, PRESERVATIVE FREE 2 ML: 5 INJECTION INTRAVENOUS at 10:11

## 2017-06-09 RX ADMIN — FENTANYL CITRATE 50 MCG: 50 INJECTION, SOLUTION INTRAMUSCULAR; INTRAVENOUS at 09:13

## 2017-06-09 RX ADMIN — OXYCODONE HYDROCHLORIDE 15 MG: 5 SOLUTION ORAL at 17:34

## 2017-06-09 RX ADMIN — Medication 10 ML: at 11:26

## 2017-06-09 RX ADMIN — ACETAMINOPHEN 975 MG: 160 SUSPENSION ORAL at 11:27

## 2017-06-09 RX ADMIN — ENOXAPARIN SODIUM 40 MG: 40 INJECTION SUBCUTANEOUS at 11:46

## 2017-06-09 RX ADMIN — MIDAZOLAM HYDROCHLORIDE 1 MG: 1 INJECTION, SOLUTION INTRAMUSCULAR; INTRAVENOUS at 08:14

## 2017-06-09 RX ADMIN — FENTANYL CITRATE 50 MCG: 50 INJECTION, SOLUTION INTRAMUSCULAR; INTRAVENOUS at 08:29

## 2017-06-09 RX ADMIN — SUGAMMADEX 100 MG: 100 INJECTION, SOLUTION INTRAVENOUS at 09:04

## 2017-06-09 RX ADMIN — ACETAMINOPHEN 975 MG: 160 SUSPENSION ORAL at 04:14

## 2017-06-09 RX ADMIN — Medication 10 ML: at 21:12

## 2017-06-09 RX ADMIN — OXYCODONE HYDROCHLORIDE 15 MG: 5 SOLUTION ORAL at 14:23

## 2017-06-09 ASSESSMENT — LIFESTYLE VARIABLES: TOBACCO_USE: 1

## 2017-06-09 ASSESSMENT — ENCOUNTER SYMPTOMS: DYSRHYTHMIAS: 1

## 2017-06-09 NOTE — BRIEF OP NOTE
[facility], [place]    Brief Operative Note    Pre-operative diagnosis: dressing change  Post-operative diagnosis * No post-op diagnosis entered *  Procedure: Procedure(s):  Chest washout and dressing change, Esophaogastroduodenoscopy - Wound Class: II-Clean Contaminated  Surgeon: Surgeon(s) and Role:     * Jens Wise MD - Primary     * Viki Carmen MD - Resident - Assisting     * Storm Whitlock MD - Assisting  Anesthesia: General   Estimated blood loss: Minimal  Drains: Existing chest tube for drainage   Specimens: * No specimens in log *  Findings:   Esophageal stent in place on EGD. Wounds healing well, some purulent drainage from superior aspect of inferior incision. CT backed out 2cm. .  Complications: None.  Implants: None.

## 2017-06-09 NOTE — PLAN OF CARE
Problem: Individualization  Goal: Patient Preferences  Outcome: No Change  Vitals:     06/08/17 1708 06/08/17 2052 06/08/17 2351 06/09/17 0424   BP:   138/67 151/54 143/69   BP Location:   Left arm Left arm Left arm   Cuff Size:           Pulse:   79 78 96   Resp:   18 18 18   Temp:   96.8  F (36  C) 96  F (35.6  C) 97.3  F (36.3  C)   TempSrc:   Oral Oral Oral   SpO2:   100% 100% 99%   Weight: 42.2 kg (93 lb)         Height:           Patient complaining of chest/abdominal pain.  Receiving scheduled oxycodone etc with only fair relief.  Patient asked for tube feeding rate to be turned down x2 (25-20-10).  Chest tube drain pulling scant.  Chest dressing CDI.  Pharyngostomy tube to LIS-has pulled 150cc of green drng.  Patient having frequent lse stool, small amt x5.  Adequate urine output.  Lungs diminished, coughing up frothy sputum.  Continue with POC.

## 2017-06-09 NOTE — PLAN OF CARE
Problem: Goal Outcome Summary  Goal: Goal Outcome Summary  OT/Edema: Pt reduced in 7/12 LE landmarks. Now with minimal non pitting edema in distal LE and ankle. Pt can wear comperm during the day and take off at night until edema is resolved. Will discontinue edema treatment.

## 2017-06-09 NOTE — CONSULTS
[facility] [place]    Palliative Care Consultation Note    Patient: Minerva Blanco  Date of Admission:  5/18/2017    Requesting provider/team: Dr. Viki Carmen/thoracic surgery  Reason for consult: Pain management and additional support    Recommendations:  Please see assessment below for rationale.    1.  Start a fentanyl patch at 50 mcg/hr and change every 3 days.  2.  Stop the scheduled oxycodone 15 mg Q 3 hours and instead order oxycodone 10-15 mg po Q 3 hours PRN  3.  Continue the IV Dilaudid prn as already written for additional backup pain control.  4.  If she is not already on one, consider adding a probiotic to her tube feedings (Florastor comes in sprinkles for tube feedings).  5. If diarrhea not starting to improve in the coming days after stopping the antibiotics, would consider re-checking Cdiff and consider GI consult for further assessment.   6.  We will have our palliative  come by for visits for additional support and counseling. We also recommend  support.     Paged primary team to discuss these recommendations, but have not yet discussed with them directly.      Thank you for the opportunity to participate in the care of this patient and family. Our team will follow with you. Please feel free to contact the on-call Palliative provider with any urgent needs.     The patient was discussed with Dr. Lorenzo Ramirez MD  Palliative medicine fellow  Beeper [phone]    [facility] Inpatient Team Consult pager [phone] (M-F 8-4:30)  After-hours Answering Service [phone]   Palliative Clinic: [phone]     Patient seen and evaluated with Dr. Ramirez.   Agree with assessment and recommendations.    Total time spent was 90 minutes,  >50% of time was spent counseling and/or coordination of care regarding symptoms, support.    Janice Ventura  Palliative Care   Pager [phone]        Assessment & Plan   Minerva Blanco is a 72 year  "old female with chronic esophageal perforation and recurrent anastomotic leak and mediastinal abscess. She gets serial washouts and has an esophageal stent and pharyngostomy tube for drainage. She needs help with coping and more consistent pain control.    Symptoms:   Pain: She is on scheduled oxycodone 15 mg Q 3 hours ( 120 mg oxycodone/day which is about 180 mg of oral morphine equivalent).  Would recommend starting a fentanyl patch on the conservative side - 50 mcg/hr (comparable to ~100 oral morphine equivalents in a day) and use the oxycodone and IV dilaudid prn as back up. This should give her a more continuous level of analgesia. Will need to monitor for pain control and adjust dose of fentanyl patch after a few days if needing frequent prn's on the 50mcg dose.    Diarrhea: A long standing problem. C diff was checked back on 5/27 and was negative. She has been on intermittent antibiotics since. If there is not improvement, it could be rechecked. If she is not yet on probiotics, it might be beneficial for her. Florastor is a probiotic that comes in sprinkles and could be put into tube feedings. Also if not improving after stopping the antibiotics could consider asking for GI consult to weigh in.     Coping:  This has been a very tough last few months for Minerva.  She feels she might be \"depressed\" at times but denies feeling down all day long. She isn't interested in using more drugs to treat these symptoms at this time but would be interested in some counseling which can be done by our palliative social workers.    History of Present Illness   Sources of History: patient and electronic health record    72 year old female with a history of severe GERD s/p fundoplication Jan 2016 complicated by esophageal perforation. She has had persistent infection and more recently underwent esophagectomy with gastric pull through complicated by recurrent anastomotic leak and mediastinal abscess. She has undergone serial " "washouts and on 6/4/17 had a pharyngostomy tube placed along with esophageal stent placement. She is receiving nutrition by TPN and some additional nutrition by J tube feedings.  Minerva has had a long history of IBS and had frequent diarrhea especially while on antibiotics. She isn't able to tolerate tube feedings at higher rates due to uncontrolled diarrhea.     Prior to her surgery in 1/16, she was not on any scheduled prescription pain medicines. Her  Enrique tells me with her frequent surgeries and complications, she has needed inpatient and outpatient pain medicines for the last 18 months. Currently she is on Oxycodone 15 mg scheduled Q 3 hours and dilaudid 0.3-0.5 mg IV Q 2 hours prn---using ~ 3mg daily. Overall she feels she is getting adequate pain relief but feels the oxycodone wears off too fast and has noticed the pain in her chest wall and left neck is worse since her last procedure on 6/4. Dressing changes are also very painful. Her diarrhea is a long standing problem and currently she is not on any scheduled antibiotics. She is hopeful the lower rate of tube feedings will slow this down.    These long hospitalizations have been difficult for Minerva and Enrique. She is looking very forward to being able to go home and isn't happy about the idea of going to a TCU first. She feels \"depressed\" at times but isn't interested in trying any more medicines for this. She would be willing to talk to a therapist or counselor. Right now, she gets her strength from her family (especially her  Enrique) and her connection to God. She had been getting visits from her .        ROS:  Palliative Symptom Review (0=no symptom/no concern, 1=mild, 2=moderate, 3=severe):  Pain: 2  Fatigue: 1  Nausea: 0  Constipation: 0  Diarrhea: 2-3  Depressive Symptoms: 1-2  Anxiety: 1  Drowsiness: 1  Poor Appetite: N/A  Shortness of Breath: 1  Insomnia: 1-2  Delirium: 0  Overall (0 good/no concerns, 3 very poor): 2    Past " Medical History    I have reviewed this patient's medical history and updated it with pertinent information if needed.   Past Medical History:   Diagnosis Date     Atrial fibrillation (H)      Breast cancer (H) 2007    right side - lumpectomy, chemo, and local radiation     Chronic infection     infection/wound for two years after esoph surgery     Esophageal perforation      Fibromyalgia      GERD (gastroesophageal reflux disease)      Hiatal hernia      History of blood transfusion      IBS (irritable bowel syndrome)      Meniere's disease     deaf left ear     MS (multiple sclerosis) (H)      Noninfectious ileitis      Other chronic pain         Past Surgical History   I have reviewed this patient's surgical history and updated it with pertinent information if needed.  Past Surgical History:   Procedure Laterality Date     APPENDECTOMY       BACK SURGERY  5/1/2015    lumbar fusion     BRONCHOSCOPY FLEXIBLE N/A 4/17/2017    Procedure: BRONCHOSCOPY FLEXIBLE;;  Surgeon: Jens Wise MD;  [facility]     C GASTROSTOMY/JEJUN TUBE      J tube for feedings     CLOSE SPIT FISTULA N/A 4/17/2017    Procedure: CLOSE SPIT FISTULA;;  Surgeon: Jens Wise MD;  [facility]     COMBINED ESOPHAGOSCOPY, GASTROSCOPY, DUODENOSCOPY (EGD), REMOVE ESOPHAGEAL STENT N/A 8/26/2016    Procedure: COMBINED ESOPHAGOSCOPY, GASTROSCOPY, DUODENOSCOPY (EGD), REMOVE ESOPHAGEAL STENT;  Surgeon: Jens Wise MD;  [facility]     CREATE SPIT FISTULA N/A 1/9/2017    Procedure: CREATE SPIT FISTULA;  Surgeon: Jens Wise MD;  [facility]     ESOPHAGECTOMY N/A 1/9/2017    Procedure: ESOPHAGECTOMY;  Surgeon: Jens Wise MD;  [facility]     ESOPHAGOSCOPY, GASTROSCOPY, DUODENOSCOPY (EGD), COMBINED N/A 4/18/2016    Procedure: COMBINED ESOPHAGOSCOPY, GASTROSCOPY, DUODENOSCOPY (EGD);  Surgeon: Alexis Barraza MD;  [facility]     ESOPHAGOSCOPY, GASTROSCOPY,  DUODENOSCOPY (EGD), COMBINED N/A 4/25/2016    Procedure: COMBINED ESOPHAGOSCOPY, GASTROSCOPY, DUODENOSCOPY (EGD);  Surgeon: Alexis Barraza MD;  [facility]     ESOPHAGOSCOPY, GASTROSCOPY, DUODENOSCOPY (EGD), COMBINED N/A 5/4/2016    Procedure: COMBINED ESOPHAGOSCOPY, GASTROSCOPY, DUODENOSCOPY (EGD);  Surgeon: Alexis Barraza MD;  [facility]     ESOPHAGOSCOPY, GASTROSCOPY, DUODENOSCOPY (EGD), COMBINED N/A 5/18/2016    Procedure: COMBINED ESOPHAGOSCOPY, GASTROSCOPY, DUODENOSCOPY (EGD);  Surgeon: Alexis Barraza MD;  [facility]     ESOPHAGOSCOPY, GASTROSCOPY, DUODENOSCOPY (EGD), COMBINED N/A 6/22/2016    Procedure: COMBINED ESOPHAGOSCOPY, GASTROSCOPY, DUODENOSCOPY (EGD);  Surgeon: Alexis Barraza MD;  [facility]     ESOPHAGOSCOPY, GASTROSCOPY, DUODENOSCOPY (EGD), COMBINED N/A 7/12/2016    Procedure: COMBINED ESOPHAGOSCOPY, GASTROSCOPY, DUODENOSCOPY (EGD);  Surgeon: Jens Wise MD;  [facility]     ESOPHAGOSCOPY, GASTROSCOPY, DUODENOSCOPY (EGD), COMBINED N/A 4/21/2017    Procedure: COMBINED ESOPHAGOSCOPY, GASTROSCOPY, DUODENOSCOPY (EGD);  Esophagogastroduodenoscopy, Pharyngostomy Tube Placement ;  Surgeon: Jens Wise MD;  [facility]     ESOPHAGOSCOPY, GASTROSCOPY, DUODENOSCOPY (EGD), COMBINED N/A 5/19/2017    Procedure: COMBINED ESOPHAGOSCOPY, GASTROSCOPY, DUODENOSCOPY (EGD);  Upper Endoscopy, Irrigation and Debridement of Chest Wound ;  Surgeon: Nalini Barreto MD;  [facility]     ESOPHAGOSCOPY, GASTROSCOPY, DUODENOSCOPY (EGD), COMBINED N/A 5/23/2017    Procedure: COMBINED ESOPHAGOSCOPY, GASTROSCOPY, DUODENOSCOPY (EGD);;  Surgeon: Nalini Barreto MD;  [facility]     ESOPHAGOSCOPY, GASTROSCOPY, DUODENOSCOPY (EGD), DILATATION, COMBINED N/A 6/29/2016    Procedure: COMBINED ESOPHAGOSCOPY, GASTROSCOPY, DUODENOSCOPY (EGD), DILATATION;  Surgeon: Jens Wise MD;  [facility]     EXPLORE NECK N/A  5/19/2017    Procedure: EXPLORE NECK;;  Surgeon: Nalini Barreto MD;  [facility]     HC UGI ENDOSCOPY W TRANSENDOSCOPIC STENT PLACEMENT N/A 7/12/2016    Procedure: COMBINED ESOPHAGOSCOPY, GASTROSCOPY, DUODENOSCOPY (EGD), PLACE TRANSENDOSCOPIC ESOPHAGEAL STENT;  Surgeon: Jens Wise MD;  [facility]     HC UGI ENDOSCOPY W TRANSENDOSCOPIC STENT PLACEMENT N/A 7/22/2016    Procedure: COMBINED ESOPHAGOSCOPY, GASTROSCOPY, DUODENOSCOPY (EGD), PLACE TRANSENDOSCOPIC ESOPHAGEAL STENT;  Surgeon: Jens Wise MD;  [facility]     INCISION AND DRAINAGE CHEST WASHOUT, COMBINED N/A 5/27/2017    Procedure: COMBINED INCISION AND DRAINAGE CHEST WASHOUT;  Chest washout;  Surgeon: Nalini Barreto MD;  [facility]     INCISION AND DRAINAGE CHEST WASHOUT, COMBINED N/A 5/28/2017    Procedure: COMBINED INCISION AND DRAINAGE CHEST WASHOUT;  Chest washout;  Surgeon: Nalini Barreto MD;  [facility]     IRRIGATION AND DEBRIDEMENT CHEST WASHOUT, COMBINED N/A 6/29/2016    Procedure: COMBINED IRRIGATION AND DEBRIDEMENT CHEST WASHOUT;  Surgeon: Jens Wise MD;  [facility]     IRRIGATION AND DEBRIDEMENT CHEST WASHOUT, COMBINED N/A 5/23/2017    Procedure: COMBINED IRRIGATION AND DEBRIDEMENT CHEST WASHOUT;  COMBINED IRRIGATION AND DEBRIDEMENT CHEST WASHOUT,COMBINED ESOPHAGOSCOPY, GASTROSCOPY, DUODENOSCOPY (EGD) ;  Surgeon: Nalini Barreto MD;  [facility]     IRRIGATION AND DEBRIDEMENT CHEST WASHOUT, COMBINED N/A 5/24/2017    Procedure: COMBINED IRRIGATION AND DEBRIDEMENT CHEST WASHOUT;  Chest wound Washout and Dressing Change;  Surgeon: Nalini Barreto MD;  [facility]     IRRIGATION AND DEBRIDEMENT CHEST WASHOUT, COMBINED N/A 5/25/2017    Procedure: COMBINED IRRIGATION AND DEBRIDEMENT CHEST WASHOUT;  Irrigation and Debridement Chest Washout and dressing change;  Surgeon: Nalini Barreto MD;  [facility]     IRRIGATION AND DEBRIDEMENT CHEST WASHOUT, COMBINED N/A  5/26/2017    Procedure: COMBINED IRRIGATION AND DEBRIDEMENT CHEST WASHOUT;  Irrigation and Debridement Chest Washout with  dressing change;  Surgeon: Nalini Barreto MD;  [facility]     IRRIGATION AND DEBRIDEMENT CHEST WASHOUT, COMBINED N/A 5/30/2017    Procedure: COMBINED IRRIGATION AND DEBRIDEMENT CHEST WASHOUT;  Irrigation and Debridement Chest Washout , PICC line dressing change;  Surgeon: Nalini Barreto MD;  [facility]     IRRIGATION AND DEBRIDEMENT CHEST WASHOUT, COMBINED N/A 5/31/2017    Procedure: COMBINED IRRIGATION AND DEBRIDEMENT CHEST WASHOUT;  Irrigation and Debridement Chest Washout ;  Surgeon: Naliin Barreto MD;  [facility]     IRRIGATION AND DEBRIDEMENT CHEST WASHOUT, COMBINED N/A 6/1/2017    Procedure: COMBINED IRRIGATION AND DEBRIDEMENT CHEST WASHOUT;  Chest Wound Irrigation And Debridement with dressing change ;  Surgeon: Nalini Barreto MD;  [facility]     IRRIGATION AND DEBRIDEMENT CHEST WASHOUT, COMBINED N/A 6/4/2017    Procedure: COMBINED IRRIGATION AND DEBRIDEMENT CHEST WASHOUT;  COMBINED IRRIGATION AND DEBRIDEMENT CHEST WASHOUT, Esophagoscopy, Gastroscopy, Duodenoscopy (EGD) place transendoscopic esophageal stent,   Pharyngostomy and chest wall tube exchange  C-Arm;  Surgeon: Jens Wise MD;  [facility]     IRRIGATION AND DEBRIDEMENT CHEST WASHOUT, COMBINED N/A 6/2/2017    Procedure: COMBINED IRRIGATION AND DEBRIDEMENT CHEST WASHOUT;  Chest Wound Irrigation and Debridement, Dressing Change;  Surgeon: Nalini Barreto MD;  [facility]     LAPAROSCOPIC ASSISTED INSERTION TUBE JEJUNOSTOMY N/A 4/17/2017    Procedure: LAPAROSCOPIC ASSISTED INSERTION TUBE JEJUNOSTOMY;;  Surgeon: Jens Wise MD;  [facility]     LAPAROSCOPY DIAGNOSTIC (GENERAL) N/A 1/9/2017    Procedure: LAPAROSCOPY DIAGNOSTIC (GENERAL);  Surgeon: Jens Wise MD;  [facility]     LAPAROSCOPY DIAGNOSTIC (GENERAL) N/A 4/17/2017    Procedure: LAPAROSCOPY  "DIAGNOSTIC (GENERAL);  Laparoscopic , Neck Dissection, Spit Fistula Takedown, Laparoscopic Jejunostomy Tube and Pharyngostomy Tube, Gastric Pull up, Upper Endoscopy(EGD)  , Flexible Bronchoscopy ,Sternotomy ;  Surgeon: Jens Wise MD;  [facility]     LUMPECTOMY BREAST Right 2007     NERVE BLOCK PERIPHERAL N/A 8/30/2016    Procedure: NERVE BLOCK PERIPHERAL;  Surgeon: GENERIC ANESTHESIA PROVIDER;  [facility]     NISSEN FUNDOPLICATION  1/2016     PHARYNGOSTOMY N/A 4/18/2016    Procedure: PHARYNGOSTOMY;  Surgeon: Alexis Barraza MD;  [facility]     PHARYNGOSTOMY N/A 4/25/2016    Procedure: PHARYNGOSTOMY;  Surgeon: Alexis Barraza MD;  [facility]     PHARYNGOSTOMY N/A 5/4/2016    Procedure: PHARYNGOSTOMY;  Surgeon: Alexis Barraza MD;  [facility]     PHARYNGOSTOMY N/A 6/22/2016    Procedure: PHARYNGOSTOMY;  Surgeon: Alexis Barraza MD;  [facility]     PHARYNGOSTOMY N/A 6/29/2016    Procedure: PHARYNGOSTOMY;  Surgeon: Jens Wise MD;  [facility]     PHARYNGOSTOMY N/A 4/21/2017    Procedure: PHARYNGOSTOMY;;  Surgeon: Jens Wise MD;  [facility]     PHARYNGOSTOMY Left 5/31/2017    Procedure: PHARYNGOSTOMY;;  Surgeon: Nalini Barreto MD;  [facility]     PICC INSERTION Left 8/25/2016    5fr DL BioFlo PICC, 42cm (3cm external) in the L medial brachial vein w/ tip in the SVC RA junction.     PICC INSERTION - Rewire Right 05/31/2017    5fr DL BioFlo PICC, 40cm (6cm external) in the R medial brachial vein w/ tip in the SVC RA junction.     THORACOTOMY Right 1998    lung infection - \"hard crust formed on lung\"     THORACOTOMY Left 1/9/2017    Procedure: THORACOTOMY;  Surgeon: Jens Wise MD;  [facility]     WRIST SURGERY         Social History   Social History   Substance Use Topics     Smoking status: Former Smoker     Packs/day: 1.00     Years: 15.00     Types: Cigarettes     Quit " date: 1/1/1998     Smokeless tobacco: Not on file     Alcohol use No       Family History   I have reviewed this patient's family history and updated it with pertinent information if needed.   Family History   Problem Relation Age of Onset     Alzheimer Disease Mother      CEREBROVASCULAR DISEASE Father      cerebral hemorrhage     Ovarian Cancer Sister      Breast Cancer Daughter        Medications   I have reviewed this patient's medication profile and medications given in the past 24 hours.  Current Facility-Administered Medications   Medication     parenteral nutrition - ADULT compounded formula     lipids (INTRALIPID) 20 % infusion 200 mL     parenteral nutrition - ADULT compounded formula     HYDROmorphone (PF) (DILAUDID) injection 0.3-0.5 mg     enoxaparin (LOVENOX) injection 40 mg     simethicone (MYLICON) suspension 40 mg     bacitracin ointment     chlorhexidine (PERIDEX) 0.12 % solution 15 mL     chlorhexidine (HIBICLENS) 4 % liquid     lidocaine (LIDODERM) 5 % Patch 1 patch    And     lidocaine (LIDODERM) patch REMOVAL    And     lidocaine (LIDODERM) patch in PLACE     lidocaine 1 % 0.5-5 mL     lidocaine (LMX4) kit     sodium chloride (PF) 0.9% PF flush 5-50 mL     lidocaine 1 % 1 mL     lidocaine (LMX4) kit     sodium chloride (PF) 0.9% PF flush 10-20 mL     heparin lock flush 10 UNIT/ML injection 5-10 mL     heparin lock flush 10 UNIT/ML injection 5-10 mL     benzocaine-menthol (CHLORASEPTIC) 6-10 MG lozenge 1 lozenge     lidocaine (LMX4) kit     heparin lock flush 10 UNIT/ML injection 2-5 mL     lidocaine 1 % 0.5-5 mL     lidocaine (LMX4) kit     metoprolol (LOPRESSOR) suspension 25 mg     sodium chloride (PF) 0.9% PF flush 10-20 mL     sodium chloride (PF) 0.9% PF flush 10 mL     potassium chloride SA (K-DUR/KLOR-CON M) CR tablet 20-40 mEq     potassium chloride (KLOR-CON) Packet 20-40 mEq     potassium chloride 10 mEq in 100 mL intermittent infusion     potassium chloride 10 mEq in 100 mL  intermittent infusion with 10 mg lidocaine     potassium chloride 20 mEq in 50 mL intermittent infusion     magnesium sulfate 2 g in NS intermittent infusion (PharMED[facility] or FV Cmpd)     magnesium sulfate 4 g in 100 mL sterile water (premade)     potassium phosphate 15 mmol in D5W 250 mL intermittent infusion     potassium phosphate 20 mmol in D5W 500 mL intermittent infusion     potassium phosphate 20 mmol in D5W 250 mL intermittent infusion     potassium phosphate 25 mmol in D5W 500 mL intermittent infusion     alteplase (CATHFLO ACTIVASE) injection 1-2 mg     lidocaine 1 % 1 mL     lidocaine (LMX4) kit     lidocaine 1 % 1 mL     lidocaine (LMX4) kit     sodium chloride (PF) 0.9% PF flush 3 mL     sodium chloride (PF) 0.9% PF flush 3 mL     oxyCODONE (ROXICODONE) solution 15 mg     traZODone (DESYREL) tablet 100 mg     diphenhydrAMINE (BENADRYL) capsule 25 mg     fiber modular (NUTRISOURCE FIBER) packet 1 packet     diphenoxylate-atropine (LOMOTIL) liquid 10 mL     acetaminophen (TYLENOL) solution 975 mg     cholestyramine (QUESTRAN) Packet 4 g     gabapentin (NEURONTIN) solution 300 mg     loperamide (IMODIUM) liquid 4 mg     opium tincture 10 MG/ML (1%) liquid 6 mg     pantoprazole (PROTONIX) suspension 40 mg     prochlorperazine (COMPAZINE) tablet 5 mg    Or     prochlorperazine (COMPAZINE) injection 5 mg    Or     prochlorperazine (COMPAZINE) Suppository 12.5 mg     dextrose 10 % 1,000 mL infusion     glucose 40 % gel 15-30 g    Or     dextrose 50 % injection 25-50 mL    Or     glucagon injection 1 mg     artificial saliva (BIOTENE DRY MOUTHWASH) liquid 20 mL     ipratropium - albuterol 0.5 mg/2.5 mg/3 mL (DUONEB) neb solution 3 mL     labetalol (NORMODYNE/TRANDATE) injection 10-40 mg     albuterol (PROAIR HFA/PROVENTIL HFA/VENTOLIN HFA) Inhaler 4 puff     albuterol neb solution 2.5 mg     sodium chloride (PF) 0.9% PF flush 3 mL     sodium chloride (PF) 0.9% PF flush 3 mL     naloxone (NARCAN) injection 0.1-0.4  mg     ondansetron (ZOFRAN-ODT) ODT tab 4 mg    Or     ondansetron (ZOFRAN) injection 4 mg     ferrous sulfate 300 (60 FE) MG/5ML syrup 300 mg     Facility-Administered Medications Ordered in Other Encounters   Medication     bupivacaine 0.25 % - EPINEPHrine 1:200,000 injection       Physical Exam   Vital Signs: Temp: 96.6  F (35.9  C) Temp src: Temporal BP: 136/68 Pulse: 89 Heart Rate: 94 Resp: 16 SpO2: 100 % O2 Device: None (Room air) Oxygen Delivery: 6 LPM  Weight: 93 lbs 0 oz    Physical Exam:   Gen: Appears comfortable in bed, talkative and cooperative  HEENT:  Conjunctiva clear. Sclera anicteric pharynx clear. Pharyngostomy tube with mild redness.  CV: RRR without murmur.  Resp: normal work of breathing, no wheezing. Large sternal dressing - not removed.  Abd: soft, minimal tenderness, nd, +BS J tube site clear.  Msk: no gross deformity but notable sarcopenia  Skin:  no jaundice  Ext: warm, well perfused. no edema  Neuro:  EOM, vision, Speech fluent. Moves all extremities equally  Mental status/Psych: alert. Oriented. Asks/answers questions appropriately. Affect is occasionally sad/tearful.      Data   Data reviewed:    Last Basic Metabolic Panel:  Lab Results   Component Value Date     06/07/2017      Lab Results   Component Value Date    POTASSIUM 4.1 06/07/2017     Lab Results   Component Value Date    CHLORIDE 103 06/07/2017     Lab Results   Component Value Date    ANNABELLE 8.6 06/07/2017     Lab Results   Component Value Date    CO2 26 06/07/2017     Lab Results   Component Value Date    BUN 18 06/07/2017     Lab Results   Component Value Date    CR 0.39 06/07/2017     Lab Results   Component Value Date    GLC 94 06/07/2017     Lab Results   Component Value Date    WBC 10.0 06/07/2017     Lab Results   Component Value Date    RBC 2.73 06/07/2017     Lab Results   Component Value Date    HGB 7.9 06/07/2017     Lab Results   Component Value Date    HCT 25.8 06/07/2017     Lab Results   Component Value  Date    MCV 95 06/07/2017     Lab Results   Component Value Date    MCH 28.9 06/07/2017     Lab Results   Component Value Date    MCHC 30.6 06/07/2017     Lab Results   Component Value Date    RDW 20.3 06/07/2017     Lab Results   Component Value Date     06/07/2017       Jacek HAILE [facility]  Pager: [phone]

## 2017-06-09 NOTE — PLAN OF CARE
Problem: Goal Outcome Summary  Goal: Goal Outcome Summary  Vitals:     06/09/17 1205 06/09/17 1223 06/09/17 1240 06/09/17 1253   BP: 123/57 (!) 88/48 98/50 126/62   BP Location:           Cuff Size:           Pulse:           Resp: 18 18 18 18   Temp:           TempSrc:           SpO2: 97% 97% 99% 98%   Weight:           Height:           pt arrived from PACU after having a chest washout and dressing change, vital stable, slight tachycardic,   Alert and oriented, sternal wound and chest dressing intact and dry, pt complain of pain, dilaudid and schedule Oxy with some relief,  Up to the commode voiding adequate, Pharyngostomy to LIWS, chest tube to gravity, J-tube intact and patent, resting in bed,  Continue with plan of care.

## 2017-06-09 NOTE — ANESTHESIA POSTPROCEDURE EVALUATION
Patient: Minerva Blanco    Procedure(s):  Chest washout and dressing change, Esophaogastroduodenoscopy - Wound Class: IV-Dirty or Infected    Diagnosis:dressing change  Diagnosis Additional Information: No value filed.    Anesthesia Type:  General, RSI, ETT    Note:  Anesthesia Post Evaluation    Patient location during evaluation: PACU and Bedside  Patient participation: Able to fully participate in evaluation  Level of consciousness: sleepy but conscious  Pain management: adequate  Airway patency: patent  Cardiovascular status: acceptable  Respiratory status: acceptable  Hydration status: acceptable  PONV: none     Anesthetic complications: None          Last vitals:  Vitals:    06/09/17 0916 06/09/17 0925 06/09/17 0940   BP: 130/86 143/72 142/68   Pulse:      Resp: 22 20 20   Temp: 36.7  C (98  F)  36.7  C (98.1  F)   SpO2: 100% 100% 100%         Electronically Signed By: Bassam Vega MD  June 9, 2017  10:12 AM

## 2017-06-09 NOTE — ANESTHESIA CARE TRANSFER NOTE
Patient: Minerva Blanco    Procedure(s):  Chest washout and dressing change, Esophaogastroduodenoscopy - Wound Class: IV-Dirty or Infected    Diagnosis: dressing change  Diagnosis Additional Information: No value filed.    Anesthesia Type:   General, RSI, ETT     Note:  Airway :Face Mask  Patient transferred to:PACU  Comments:   -on 6L O2 via FM, Pt Spont.  breathing, awake & alert, monitors placed, VSS, RN at bedside, no airway      Vitals: (Last set prior to Anesthesia Care Transfer)    CRNA VITALS  6/9/2017 0843 - 6/9/2017 0917      6/9/2017             Resp Rate (observed): (!)  1    Resp Rate (set): 10                Electronically Signed By: CLIVE Linn CRNA  June 9, 2017  9:17 AM

## 2017-06-09 NOTE — OP NOTE
DATE OF PROCEDURE:  2017      PREOPERATIVE DIAGNOSES:   1.  Status post retrosternal gastric pull-up.   2.  Anastomotic leak.        PROCEDURE PERFORMED:   1.  Esophagogastroscopy.   2.  Debridement of chest wound.      SURGEON:  Nirali Lopez MD (present and participated in the entire procedure).      RESIDENT SURGEON:  Viki Carmen MD and Storm Whitlock MD      ANESTHESIA:  General.      ESTIMATED BLOOD LOSS:  1  mL.      COMPLICATIONS:  None immediate.      FINDINGS:  The stent was in good position and there was no distal rubbing of the stent onto the gastric conduit.  The wound just had some slight murky fluid, but no bilious drainage.      DESCRIPTION OF PROCEDURE:  We performed an esophagogastroscopy with the above-mentioned findings and then irrigated and debrided the wound a little bit and pulled the chest tube back and resecured it.      The patient tolerated the procedure well.         NIRALI LOPEZ MD             D: 2017 08:58   T: 2017 09:32   MT: MYRNA      Name:     FAVIAN MALONE   MRN:      [id number]        Account:        [id number]   :      1945           Procedure Date: 2017      Document: [id number]       cc: [facility] Billing

## 2017-06-09 NOTE — PLAN OF CARE
Problem: Goal Outcome Summary  Goal: Goal Outcome Summary  Edema 7B: Attempted to see patient to assess LEs however patient requesting session be rescheduled.

## 2017-06-09 NOTE — PROGRESS NOTES
CLINICAL NUTRITION SERVICES - BRIEF NOTE  *See RD note on 6/7 for last nutrition reassessment details    INTERVENTIONS  Implementation  Order TG today @ 4PM to assess ongoing tolerance of IV lipids. Recommend monitor weekly while on TPN.    Monitoring/Evaluation  Progress toward goals will be monitored and evaluated per protocol.     Evie Montanez RD, LD   7B RD Pager: [phone]

## 2017-06-10 PROCEDURE — 25000125 ZZHC RX 250: Performed by: SURGERY

## 2017-06-10 PROCEDURE — A9270 NON-COVERED ITEM OR SERVICE: HCPCS | Mod: GY | Performed by: PHYSICIAN ASSISTANT

## 2017-06-10 PROCEDURE — 25000132 ZZH RX MED GY IP 250 OP 250 PS 637: Mod: GY | Performed by: SURGERY

## 2017-06-10 PROCEDURE — A9270 NON-COVERED ITEM OR SERVICE: HCPCS | Mod: GY | Performed by: STUDENT IN AN ORGANIZED HEALTH CARE EDUCATION/TRAINING PROGRAM

## 2017-06-10 PROCEDURE — A9270 NON-COVERED ITEM OR SERVICE: HCPCS | Mod: GY | Performed by: SURGERY

## 2017-06-10 PROCEDURE — 25000125 ZZHC RX 250: Performed by: STUDENT IN AN ORGANIZED HEALTH CARE EDUCATION/TRAINING PROGRAM

## 2017-06-10 PROCEDURE — 27210429 ZZH NUTRITION PRODUCT INTERMEDIATE LITER

## 2017-06-10 PROCEDURE — 25000132 ZZH RX MED GY IP 250 OP 250 PS 637: Mod: GY | Performed by: STUDENT IN AN ORGANIZED HEALTH CARE EDUCATION/TRAINING PROGRAM

## 2017-06-10 PROCEDURE — 25000125 ZZHC RX 250: Performed by: PHYSICIAN ASSISTANT

## 2017-06-10 PROCEDURE — 25000128 H RX IP 250 OP 636: Performed by: STUDENT IN AN ORGANIZED HEALTH CARE EDUCATION/TRAINING PROGRAM

## 2017-06-10 PROCEDURE — 25000132 ZZH RX MED GY IP 250 OP 250 PS 637: Mod: GY | Performed by: PHYSICIAN ASSISTANT

## 2017-06-10 PROCEDURE — 12000003 ZZH R&B CRITICAL UMMC

## 2017-06-10 PROCEDURE — 40000802 ZZH SITE CHECK

## 2017-06-10 RX ORDER — SACCHAROMYCES BOULARDII 250 MG
250 CAPSULE ORAL 2 TIMES DAILY
Status: DISCONTINUED | OUTPATIENT
Start: 2017-06-10 | End: 2017-06-29 | Stop reason: HOSPADM

## 2017-06-10 RX ADMIN — Medication 2 G: at 20:39

## 2017-06-10 RX ADMIN — PANTOPRAZOLE SODIUM 40 MG: 40 TABLET, DELAYED RELEASE ORAL at 08:13

## 2017-06-10 RX ADMIN — HYDROMORPHONE HYDROCHLORIDE 0.5 MG: 1 INJECTION, SOLUTION INTRAMUSCULAR; INTRAVENOUS; SUBCUTANEOUS at 10:31

## 2017-06-10 RX ADMIN — Medication 10 ML: at 16:18

## 2017-06-10 RX ADMIN — TRAZODONE HYDROCHLORIDE 100 MG: 100 TABLET ORAL at 22:59

## 2017-06-10 RX ADMIN — Medication 6 MG: at 04:22

## 2017-06-10 RX ADMIN — GABAPENTIN 300 MG: 250 SOLUTION ORAL at 12:07

## 2017-06-10 RX ADMIN — CHOLESTYRAMINE 4 G: 4 POWDER, FOR SUSPENSION ORAL at 08:18

## 2017-06-10 RX ADMIN — Medication 10 ML: at 12:07

## 2017-06-10 RX ADMIN — Medication 1 PACKET: at 12:09

## 2017-06-10 RX ADMIN — OXYCODONE HYDROCHLORIDE 15 MG: 5 SOLUTION ORAL at 04:21

## 2017-06-10 RX ADMIN — LOPERAMIDE HYDROCHLORIDE 4 MG: 1 SOLUTION ORAL at 12:12

## 2017-06-10 RX ADMIN — ENOXAPARIN SODIUM 40 MG: 40 INJECTION SUBCUTANEOUS at 12:12

## 2017-06-10 RX ADMIN — LOPERAMIDE HYDROCHLORIDE 4 MG: 1 SOLUTION ORAL at 08:16

## 2017-06-10 RX ADMIN — Medication 250 MG: at 20:57

## 2017-06-10 RX ADMIN — HYDROMORPHONE HYDROCHLORIDE 0.5 MG: 1 INJECTION, SOLUTION INTRAMUSCULAR; INTRAVENOUS; SUBCUTANEOUS at 22:59

## 2017-06-10 RX ADMIN — BACITRACIN ZINC: 500 OINTMENT TOPICAL at 08:15

## 2017-06-10 RX ADMIN — SIMETHICONE 40 MG: 20 SUSPENSION/ DROPS ORAL at 08:14

## 2017-06-10 RX ADMIN — SIMETHICONE 40 MG: 20 SUSPENSION/ DROPS ORAL at 00:33

## 2017-06-10 RX ADMIN — BACITRACIN ZINC: 500 OINTMENT TOPICAL at 20:39

## 2017-06-10 RX ADMIN — HYDROMORPHONE HYDROCHLORIDE 0.5 MG: 1 INJECTION, SOLUTION INTRAMUSCULAR; INTRAVENOUS; SUBCUTANEOUS at 14:02

## 2017-06-10 RX ADMIN — Medication 6 MG: at 10:11

## 2017-06-10 RX ADMIN — Medication 10 ML: at 20:41

## 2017-06-10 RX ADMIN — Medication 1 PACKET: at 20:40

## 2017-06-10 RX ADMIN — OXYCODONE HYDROCHLORIDE 15 MG: 5 SOLUTION ORAL at 12:06

## 2017-06-10 RX ADMIN — LOPERAMIDE HYDROCHLORIDE 4 MG: 1 SOLUTION ORAL at 20:41

## 2017-06-10 RX ADMIN — Medication 6 MG: at 22:59

## 2017-06-10 RX ADMIN — SIMETHICONE 40 MG: 20 SUSPENSION/ DROPS ORAL at 04:22

## 2017-06-10 RX ADMIN — I.V. FAT EMULSION 200 ML: 20 EMULSION INTRAVENOUS at 20:42

## 2017-06-10 RX ADMIN — ACETAMINOPHEN 975 MG: 160 SUSPENSION ORAL at 20:37

## 2017-06-10 RX ADMIN — HYDROMORPHONE HYDROCHLORIDE 0.5 MG: 1 INJECTION, SOLUTION INTRAMUSCULAR; INTRAVENOUS; SUBCUTANEOUS at 06:55

## 2017-06-10 RX ADMIN — ACETAMINOPHEN 975 MG: 160 SUSPENSION ORAL at 12:06

## 2017-06-10 RX ADMIN — OXYCODONE HYDROCHLORIDE 15 MG: 5 SOLUTION ORAL at 20:38

## 2017-06-10 RX ADMIN — OXYCODONE HYDROCHLORIDE 15 MG: 5 SOLUTION ORAL at 16:17

## 2017-06-10 RX ADMIN — CHLORHEXIDINE GLUCONATE 15 ML: 1.2 RINSE ORAL at 08:14

## 2017-06-10 RX ADMIN — LOPERAMIDE HYDROCHLORIDE 4 MG: 1 SOLUTION ORAL at 16:19

## 2017-06-10 RX ADMIN — Medication 6 MG: at 16:18

## 2017-06-10 RX ADMIN — SIMETHICONE 40 MG: 20 SUSPENSION/ DROPS ORAL at 16:18

## 2017-06-10 RX ADMIN — POTASSIUM CHLORIDE: 2 INJECTION, SOLUTION, CONCENTRATE INTRAVENOUS at 20:44

## 2017-06-10 RX ADMIN — GABAPENTIN 300 MG: 250 SOLUTION ORAL at 20:57

## 2017-06-10 RX ADMIN — METOPROLOL TARTRATE 25 MG: 100 TABLET ORAL at 08:17

## 2017-06-10 RX ADMIN — MINERAL SUPPLEMENT IRON 300 MG / 5 ML STRENGTH LIQUID 100 PER BOX UNFLAVORED 300 MG: at 08:14

## 2017-06-10 RX ADMIN — SIMETHICONE 40 MG: 20 SUSPENSION/ DROPS ORAL at 20:38

## 2017-06-10 RX ADMIN — HYDROMORPHONE HYDROCHLORIDE 0.5 MG: 1 INJECTION, SOLUTION INTRAMUSCULAR; INTRAVENOUS; SUBCUTANEOUS at 18:46

## 2017-06-10 RX ADMIN — Medication 10 ML: at 08:14

## 2017-06-10 RX ADMIN — HYDROMORPHONE HYDROCHLORIDE 0.5 MG: 1 INJECTION, SOLUTION INTRAMUSCULAR; INTRAVENOUS; SUBCUTANEOUS at 02:30

## 2017-06-10 RX ADMIN — CHLORHEXIDINE GLUCONATE 15 ML: 1.2 RINSE ORAL at 20:40

## 2017-06-10 RX ADMIN — METOPROLOL TARTRATE 25 MG: 100 TABLET ORAL at 20:40

## 2017-06-10 RX ADMIN — GABAPENTIN 300 MG: 250 SOLUTION ORAL at 04:22

## 2017-06-10 RX ADMIN — OXYCODONE HYDROCHLORIDE 15 MG: 5 SOLUTION ORAL at 08:14

## 2017-06-10 RX ADMIN — ACETAMINOPHEN 975 MG: 160 SUSPENSION ORAL at 04:21

## 2017-06-10 RX ADMIN — SODIUM CHLORIDE, PRESERVATIVE FREE 5 ML: 5 INJECTION INTRAVENOUS at 17:47

## 2017-06-10 RX ADMIN — Medication 1 PACKET: at 10:11

## 2017-06-10 RX ADMIN — SIMETHICONE 40 MG: 20 SUSPENSION/ DROPS ORAL at 12:07

## 2017-06-10 RX ADMIN — CHLORHEXIDINE GLUCONATE 15 ML: 1.2 RINSE ORAL at 14:02

## 2017-06-10 RX ADMIN — OXYCODONE HYDROCHLORIDE 15 MG: 5 SOLUTION ORAL at 00:33

## 2017-06-10 NOTE — PLAN OF CARE
Problem: Goal Outcome Summary  Goal: Goal Outcome Summary  Vitals:     06/09/17 2015 06/10/17 0025 06/10/17 0820 06/10/17 1250   BP: 131/71 137/64 134/72 104/60   BP Location:   Left arm Left arm Left arm   Cuff Size:           Pulse:     81     Resp: 16 16 16 16   Temp: 96.9  F (36.1  C) 96.1  F (35.6  C) 96.7  F (35.9  C) 96.5  F (35.8  C)   TempSrc:   Oral Oral Oral   SpO2: 100% 99% 99% 97%   Weight:           Height:           afebrile vital stable, sating 97% on room air, chest tube to gravity with no out put, pharyngostomy tube to suction no out put,   Multiple loose stool, voiding adequate, Oxy and dilaudid alternate for pain, tube feed at 25 cc/hr,   Looks more alert and feels good, resting, no new issues, continue with plan of care.

## 2017-06-10 NOTE — PLAN OF CARE
Problem: Goal Outcome Summary  Goal: Goal Outcome Summary  7B OT: Pt declined OT due to scheduled procedure.  Somnolent upon second attempt. Will follow up per OT POC.

## 2017-06-10 NOTE — PROGRESS NOTES
Thoracic surgery progress note      No acute events overnight. Pain controlled. Not tolerating tube feeds at goal. Diarrhea persists.     Temp: 96.7  F (35.9  C) Temp  Min: 96.1  F (35.6  C)  Max: 96.9  F (36.1  C)  Resp: 16 Resp  Min: 16  Max: 18  SpO2: 99 % SpO2  Min: 98 %  Max: 100 %  Pulse: 81 Pulse  Min: 81  Max: 81  Heart Rate: 87 Heart Rate  Min: 87  Max: 96  BP: 134/72 Systolic (24hrs), Av , Min:98 , Max:137   Diastolic (24hrs), Av, Min:50, Max:72    A&O, NAD  Unlabored breathing on RA  Pharyyngostomy site with decreased swelling and erythema  Chest wounds packed - superior wound granulating well, inferior wound with some green staining  RRR  Chest tube w/ scant output    I&O  UOP 2070/250  Pharyngostomy 150/-  Chest tube 0/0    A/P: 72F w/ chronic esophageal perforation s/p Nissen at OSH, now s/p esophagectomy with gastric pull-up c/b recurrent anastomotic leak and mediastinal abscess s/p serial washouts, pharyngostomy tube and most recently EGD w/ stent placement on      - Wean off IV pain meds as able - appreciate pain mgmt recs  - Strict NPO. C/w TPN and tube feeds as tolerated (goal 30cc/hr for 18 hours and off 6 hours overnight).   - Chronic diarrhea - max doses of anti-motility agents. Will get GI consult  - Daily dressing changes  - Lovenox     Seen w/ thoracic fellow, Dr Chinyere Liu MD  Surgery PGY1  [phone]

## 2017-06-10 NOTE — PLAN OF CARE
Problem: Goal Outcome Summary  Goal: Goal Outcome Summary  Vitals:    06/09/17 1253 06/09/17 1633 06/09/17 2015 06/10/17 0025   BP: 126/62 136/68 131/71 137/64   BP Location:    Left arm   Cuff Size:       Pulse:       Resp: 18 16 16 16   Temp:  96.6  F (35.9  C) 96.9  F (36.1  C) 96.1  F (35.6  C)   TempSrc:    Oral   SpO2: 98% 100% 100% 99%   Weight:       Height:       Patient c/o pain in chest wound and in P-tube site, oxycodone 10 mg and Dilaudid 0.5 mg iv PRN given alternately with relief; Oxycodone with longer effect as per pt. Applied Fentanyl Patch. Pharyngostomy site is reddened; cleansed with Chlorhexidine 4 % and applied Bacitricin as ordered, P-tube to LIS, 50 cc additional output in cannister, irrigated 30 cc NS. Sternum and abdominal wound are cdi, drain site cdi, no output in drainage bag. J-tube in place, moderate drain in site, all due meds given, refused Fiber, TF was turned off at 2300 as per pt request, due to continues diarrhea. TPN at 60 ml/hr, Lipids at 16.7 ml/hr via PICC, purple port. Clear LS, denies SOB.  +BS, had 10x loose stools as per pt, 3x witnessed. Voiding adequate amount, sometimes mixed with stool. Up to commode independendtly. Declined to resume TF this a.m.

## 2017-06-11 LAB — GLUCOSE BLDC GLUCOMTR-MCNC: 124 MG/DL (ref 70–99)

## 2017-06-11 PROCEDURE — A9270 NON-COVERED ITEM OR SERVICE: HCPCS | Mod: GY | Performed by: SURGERY

## 2017-06-11 PROCEDURE — 40000558 ZZH STATISTIC CVC DRESSING CHANGE

## 2017-06-11 PROCEDURE — 25000125 ZZHC RX 250: Performed by: PHYSICIAN ASSISTANT

## 2017-06-11 PROCEDURE — 25000128 H RX IP 250 OP 636: Performed by: STUDENT IN AN ORGANIZED HEALTH CARE EDUCATION/TRAINING PROGRAM

## 2017-06-11 PROCEDURE — 27210429 ZZH NUTRITION PRODUCT INTERMEDIATE LITER

## 2017-06-11 PROCEDURE — 12000003 ZZH R&B CRITICAL UMMC

## 2017-06-11 PROCEDURE — 25000132 ZZH RX MED GY IP 250 OP 250 PS 637: Mod: GY | Performed by: PHYSICIAN ASSISTANT

## 2017-06-11 PROCEDURE — 40000802 ZZH SITE CHECK

## 2017-06-11 PROCEDURE — 25000132 ZZH RX MED GY IP 250 OP 250 PS 637: Mod: GY | Performed by: SURGERY

## 2017-06-11 PROCEDURE — A9270 NON-COVERED ITEM OR SERVICE: HCPCS | Mod: GY | Performed by: STUDENT IN AN ORGANIZED HEALTH CARE EDUCATION/TRAINING PROGRAM

## 2017-06-11 PROCEDURE — A9270 NON-COVERED ITEM OR SERVICE: HCPCS | Mod: GY | Performed by: PHYSICIAN ASSISTANT

## 2017-06-11 PROCEDURE — 25000132 ZZH RX MED GY IP 250 OP 250 PS 637: Mod: GY | Performed by: STUDENT IN AN ORGANIZED HEALTH CARE EDUCATION/TRAINING PROGRAM

## 2017-06-11 PROCEDURE — 25000125 ZZHC RX 250: Performed by: SURGERY

## 2017-06-11 PROCEDURE — 25000125 ZZHC RX 250: Performed by: STUDENT IN AN ORGANIZED HEALTH CARE EDUCATION/TRAINING PROGRAM

## 2017-06-11 PROCEDURE — 00000146 ZZHCL STATISTIC GLUCOSE BY METER IP

## 2017-06-11 RX ADMIN — OXYCODONE HYDROCHLORIDE 15 MG: 5 SOLUTION ORAL at 02:09

## 2017-06-11 RX ADMIN — OXYCODONE HYDROCHLORIDE 15 MG: 5 SOLUTION ORAL at 14:40

## 2017-06-11 RX ADMIN — BACITRACIN ZINC: 500 OINTMENT TOPICAL at 08:29

## 2017-06-11 RX ADMIN — METOPROLOL TARTRATE 25 MG: 100 TABLET ORAL at 19:54

## 2017-06-11 RX ADMIN — Medication 250 MG: at 08:28

## 2017-06-11 RX ADMIN — LOPERAMIDE HYDROCHLORIDE 4 MG: 1 SOLUTION ORAL at 08:28

## 2017-06-11 RX ADMIN — Medication 250 MG: at 19:54

## 2017-06-11 RX ADMIN — Medication 10 ML: at 16:05

## 2017-06-11 RX ADMIN — LOPERAMIDE HYDROCHLORIDE 4 MG: 1 SOLUTION ORAL at 16:05

## 2017-06-11 RX ADMIN — TRAZODONE HYDROCHLORIDE 100 MG: 100 TABLET ORAL at 22:06

## 2017-06-11 RX ADMIN — OXYCODONE HYDROCHLORIDE 15 MG: 5 SOLUTION ORAL at 18:26

## 2017-06-11 RX ADMIN — Medication 10 ML: at 19:54

## 2017-06-11 RX ADMIN — METOPROLOL TARTRATE 25 MG: 100 TABLET ORAL at 08:28

## 2017-06-11 RX ADMIN — Medication 1 PACKET: at 19:55

## 2017-06-11 RX ADMIN — Medication 1 LOZENGE: at 08:28

## 2017-06-11 RX ADMIN — Medication 10 ML: at 11:56

## 2017-06-11 RX ADMIN — CHOLESTYRAMINE 4 G: 4 POWDER, FOR SUSPENSION ORAL at 08:28

## 2017-06-11 RX ADMIN — ACETAMINOPHEN 975 MG: 160 SUSPENSION ORAL at 11:55

## 2017-06-11 RX ADMIN — SIMETHICONE 40 MG: 20 SUSPENSION/ DROPS ORAL at 10:59

## 2017-06-11 RX ADMIN — OXYCODONE HYDROCHLORIDE 15 MG: 5 SOLUTION ORAL at 22:06

## 2017-06-11 RX ADMIN — Medication 6 MG: at 04:59

## 2017-06-11 RX ADMIN — GABAPENTIN 300 MG: 250 SOLUTION ORAL at 04:59

## 2017-06-11 RX ADMIN — OXYCODONE HYDROCHLORIDE 15 MG: 5 SOLUTION ORAL at 11:55

## 2017-06-11 RX ADMIN — Medication 6 MG: at 22:06

## 2017-06-11 RX ADMIN — SIMETHICONE 40 MG: 20 SUSPENSION/ DROPS ORAL at 22:06

## 2017-06-11 RX ADMIN — GABAPENTIN 300 MG: 250 SOLUTION ORAL at 11:56

## 2017-06-11 RX ADMIN — SODIUM CHLORIDE, PRESERVATIVE FREE 5 ML: 5 INJECTION INTRAVENOUS at 16:10

## 2017-06-11 RX ADMIN — HYDROMORPHONE HYDROCHLORIDE 0.5 MG: 1 INJECTION, SOLUTION INTRAMUSCULAR; INTRAVENOUS; SUBCUTANEOUS at 19:54

## 2017-06-11 RX ADMIN — LIDOCAINE 1 PATCH: 50 PATCH TOPICAL at 08:29

## 2017-06-11 RX ADMIN — SIMETHICONE 40 MG: 20 SUSPENSION/ DROPS ORAL at 18:26

## 2017-06-11 RX ADMIN — MINERAL SUPPLEMENT IRON 300 MG / 5 ML STRENGTH LIQUID 100 PER BOX UNFLAVORED 300 MG: at 08:28

## 2017-06-11 RX ADMIN — I.V. FAT EMULSION 200 ML: 20 EMULSION INTRAVENOUS at 19:56

## 2017-06-11 RX ADMIN — SIMETHICONE 40 MG: 20 SUSPENSION/ DROPS ORAL at 14:33

## 2017-06-11 RX ADMIN — SIMETHICONE 40 MG: 20 SUSPENSION/ DROPS ORAL at 02:09

## 2017-06-11 RX ADMIN — Medication 1 PACKET: at 10:58

## 2017-06-11 RX ADMIN — GABAPENTIN 300 MG: 250 SOLUTION ORAL at 19:54

## 2017-06-11 RX ADMIN — Medication 6 MG: at 10:59

## 2017-06-11 RX ADMIN — ENOXAPARIN SODIUM 40 MG: 40 INJECTION SUBCUTANEOUS at 11:56

## 2017-06-11 RX ADMIN — Medication 1 PACKET: at 14:34

## 2017-06-11 RX ADMIN — LOPERAMIDE HYDROCHLORIDE 4 MG: 1 SOLUTION ORAL at 19:54

## 2017-06-11 RX ADMIN — CHLORHEXIDINE GLUCONATE 15 ML: 1.2 RINSE ORAL at 14:33

## 2017-06-11 RX ADMIN — LOPERAMIDE HYDROCHLORIDE 4 MG: 1 SOLUTION ORAL at 11:56

## 2017-06-11 RX ADMIN — OXYCODONE HYDROCHLORIDE 15 MG: 5 SOLUTION ORAL at 08:27

## 2017-06-11 RX ADMIN — Medication 10 ML: at 08:28

## 2017-06-11 RX ADMIN — Medication 6 MG: at 16:05

## 2017-06-11 RX ADMIN — POTASSIUM CHLORIDE: 2 INJECTION, SOLUTION, CONCENTRATE INTRAVENOUS at 19:57

## 2017-06-11 RX ADMIN — ACETAMINOPHEN 975 MG: 160 SUSPENSION ORAL at 04:59

## 2017-06-11 RX ADMIN — OXYCODONE HYDROCHLORIDE 15 MG: 5 SOLUTION ORAL at 04:58

## 2017-06-11 RX ADMIN — SIMETHICONE 40 MG: 20 SUSPENSION/ DROPS ORAL at 05:06

## 2017-06-11 RX ADMIN — PANTOPRAZOLE SODIUM 40 MG: 40 TABLET, DELAYED RELEASE ORAL at 08:28

## 2017-06-11 RX ADMIN — BACITRACIN ZINC: 500 OINTMENT TOPICAL at 19:55

## 2017-06-11 RX ADMIN — ACETAMINOPHEN 975 MG: 160 SUSPENSION ORAL at 19:54

## 2017-06-11 RX ADMIN — CHLORHEXIDINE GLUCONATE 15 ML: 1.2 RINSE ORAL at 19:57

## 2017-06-11 RX ADMIN — CHLORHEXIDINE GLUCONATE 15 ML: 1.2 RINSE ORAL at 08:28

## 2017-06-11 NOTE — PLAN OF CARE
Problem: Goal Outcome Summary  Goal: Goal Outcome Summary  IP OT 7B: Cancelled per pt.  Pt appearing very anxious upon OT arrival, reporting she wasn't expecting OT and was in the middle of watching a movie.  Pt wishing to reschedule for tomorrow.

## 2017-06-11 NOTE — CONSULTS
I performed a history and physical examination of the above patient and discussed the management with Dr. Rosenbaum on 6/11/2017. I reviewed the note and there are no changes to the past medical, family or social history.  A complete 10 point review of systems was obtained. Please see the HPI for pertinent positives and negatives. All other systems were reviewed and were found to be negative. I agree with the documented findings and plan of care as outlined with the following additions:     Her diarrhea closely correlates to her tube feeding.  When her tube feeds are held or rate reduced, her diarrhea reproducibly goes down.  Diarrhea does not seem dependent on type of tube feeds and in fact she is unable to tolerate enteral feeding that she could tolerate with her previous J-tube.  She also notes diarrhea worse after antibiotics, although now off antibiotics, does not seem to be recovering.  She reports that prior to her jejunostomy in April 2017, she did not have diarrhea, despite having a G-tube and a prior j-tube.       I think imaging her small bowel will be useful.  A small bowel follow through with contrast administered through her J-tube will provide information on transit time in small bowel as well as small bowel thickening that could indicate malabsorption.  Recommend serologic and fecal work-up per GI fellow note.  If no clear diagnosis after these studies, the likely will need colonoscopy for further evaluation.  Patient would like to avoid colonoscopy if able, but is willing to go through if work-up negative.       Kenyon Peres MD  GI Attending  Pager: [phone]  --------------------------------------------------------------------------            GASTROENTEROLOGY CONSULTATION      Date of Admission:  5/18/2017  Consulting physician: Dr. Nalini Barreto          ASSESSMENT AND RECOMMENDATIONS:     Assessment:  73 yo female h/o chronic esophageal perforation, Nissen procedure, most recently esophageal repair with  retrosternal gastric pull through (4/17/2017), c/b recurrent anastomotic leak, mediastinal abscess s/p serial washout, pharyngostomy tube, EGD w stent placement (6/4/2017), spit fistula take down, J tube placement in April 2017, admitted in hospital. GI consulted for persistent diarrhea.   This diarrhea is multifactorial. Most likely tube feeds related, need to confirm the location to J tube,  other possibilities are antibiotic associated diarrhea. Unlikely infectious, as Cdiff negative. Other possibilities are inflammatory, celiac disease, other infections, other medications side effects.        Recommendations:    Recommend small bowel follow through the J tube to determine the location of J tube.     Get stool studies - stool fat, elastase, alpha 1 antitrypsin, calprotectin, enteric pathogenic panel.     Accurate assessment of stool output.     Check tTG and total IgA levels.     If following workup comes back negative, will consider colonoscopy.       Thank you for involving us in this patient's care. Please do not hesitate to contact the GI service with any questions or concerns.     Pt care plan discussed with Dr. Peres , GI staff physician.    Zechariah Rosenbaum  GI fellow         Chief Complaint:     diarrhea         HPI:     73 yo female h/o chronic esophageal perforation, Nissen procedure, most recently esophagectomy with retrosternal gastric pull through (4/17/2017), c/b recurrent anastomotic leak, mediastinal abscess s/p serial washout, pharyngostomy tube, EGD w stent placement (6/4/2017), spit fistula take down, J tube placement in April 2017, admitted in hospital. GI consulted for persistent diarrhea.  According to patient she has h/o IBS before and was well controlled but since her surgery on April 17 she is having persistent diarrhea. She describes it as watery around 10 -15 BM/d, no blood, even nighttime symptoms and incontinence sometimes. She is having tube feeds through surgically placed J tube and  also getting most of her nutrition by TPN. She feels much better on days when tube feeds were stopped. Currently on 25 cc/hr, still having diarrhea. She has been on and off multiple antibiotics, but Cdiff testing has been negative. She is currently on Lomotil, opium tincture with no major benefit. She did have J tube even before for past 1 year but did not have any problem before.            Past Medical History:     Reviewed and edited as appropriate  Past Medical History:   Diagnosis Date     Atrial fibrillation (H)      Breast cancer (H) 2007    right side - lumpectomy, chemo, and local radiation     Chronic infection     infection/wound for two years after esoph surgery     Esophageal perforation      Fibromyalgia      GERD (gastroesophageal reflux disease)      Hiatal hernia      History of blood transfusion      IBS (irritable bowel syndrome)      Meniere's disease     deaf left ear     MS (multiple sclerosis) (H)      Noninfectious ileitis      Other chronic pain             Past Surgical History:   Reviewed and edited as appropriate   Past Surgical History:   Procedure Laterality Date     APPENDECTOMY       BACK SURGERY  5/1/2015    lumbar fusion     BRONCHOSCOPY FLEXIBLE N/A 4/17/2017    Procedure: BRONCHOSCOPY FLEXIBLE;;  Surgeon: Jens Wise MD;  Location: [facility]     C GASTROSTOMY/JEJUN TUBE      J tube for feedings     CLOSE SPIT FISTULA N/A 4/17/2017    Procedure: CLOSE SPIT FISTULA;;  Surgeon: Jens Wise MD;  Location: [facility]     COMBINED ESOPHAGOSCOPY, GASTROSCOPY, DUODENOSCOPY (EGD), REMOVE ESOPHAGEAL STENT N/A 8/26/2016    Procedure: COMBINED ESOPHAGOSCOPY, GASTROSCOPY, DUODENOSCOPY (EGD), REMOVE ESOPHAGEAL STENT;  Surgeon: Jens Wise MD;  Location[facility]     CREATE SPIT FISTULA N/A 1/9/2017    Procedure: CREATE SPIT FISTULA;  Surgeon: Jens Wise MD;  [facility]     ESOPHAGECTOMY N/A 1/9/2017    Procedure: ESOPHAGECTOMY;  Surgeon:  Jens Wise MD;  [facility]     ESOPHAGOSCOPY, GASTROSCOPY, DUODENOSCOPY (EGD), COMBINED N/A 4/18/2016    Procedure: COMBINED ESOPHAGOSCOPY, GASTROSCOPY, DUODENOSCOPY (EGD);  Surgeon: Alexis Barraza MD;  [facility]     ESOPHAGOSCOPY, GASTROSCOPY, DUODENOSCOPY (EGD), COMBINED N/A 4/25/2016    Procedure: COMBINED ESOPHAGOSCOPY, GASTROSCOPY, DUODENOSCOPY (EGD);  Surgeon: Alexis Barraza MD;  [facility]     ESOPHAGOSCOPY, GASTROSCOPY, DUODENOSCOPY (EGD), COMBINED N/A 5/4/2016    Procedure: COMBINED ESOPHAGOSCOPY, GASTROSCOPY, DUODENOSCOPY (EGD);  Surgeon: Alexis Barraza MD;  [facility]     ESOPHAGOSCOPY, GASTROSCOPY, DUODENOSCOPY (EGD), COMBINED N/A 5/18/2016    Procedure: COMBINED ESOPHAGOSCOPY, GASTROSCOPY, DUODENOSCOPY (EGD);  Surgeon: Alexis Barraza MD;  [facility]     ESOPHAGOSCOPY, GASTROSCOPY, DUODENOSCOPY (EGD), COMBINED N/A 6/22/2016    Procedure: COMBINED ESOPHAGOSCOPY, GASTROSCOPY, DUODENOSCOPY (EGD);  Surgeon: Alexis Barraza MD;  [facility]     ESOPHAGOSCOPY, GASTROSCOPY, DUODENOSCOPY (EGD), COMBINED N/A 7/12/2016    Procedure: COMBINED ESOPHAGOSCOPY, GASTROSCOPY, DUODENOSCOPY (EGD);  Surgeon: Jens Wise MD;  [facility]     ESOPHAGOSCOPY, GASTROSCOPY, DUODENOSCOPY (EGD), COMBINED N/A 4/21/2017    Procedure: COMBINED ESOPHAGOSCOPY, GASTROSCOPY, DUODENOSCOPY (EGD);  Esophagogastroduodenoscopy, Pharyngostomy Tube Placement ;  Surgeon: Jens Wise MD;  [facility]     ESOPHAGOSCOPY, GASTROSCOPY, DUODENOSCOPY (EGD), COMBINED N/A 5/19/2017    Procedure: COMBINED ESOPHAGOSCOPY, GASTROSCOPY, DUODENOSCOPY (EGD);  Upper Endoscopy, Irrigation and Debridement of Chest Wound ;  Surgeon: Nalini Barreto MD;  [facility]     ESOPHAGOSCOPY, GASTROSCOPY, DUODENOSCOPY (EGD), COMBINED N/A 5/23/2017    Procedure: COMBINED ESOPHAGOSCOPY, GASTROSCOPY, DUODENOSCOPY (EGD);;  Surgeon: Estevan  Nalini NAVARRO MD;  [facility]     ESOPHAGOSCOPY, GASTROSCOPY, DUODENOSCOPY (EGD), DILATATION, COMBINED N/A 6/29/2016    Procedure: COMBINED ESOPHAGOSCOPY, GASTROSCOPY, DUODENOSCOPY (EGD), DILATATION;  Surgeon: Jens Wise MD;  [facility]     EXPLORE NECK N/A 5/19/2017    Procedure: EXPLORE NECK;;  Surgeon: Nalini Barreto MD;  [facility]     HC UGI ENDOSCOPY W TRANSENDOSCOPIC STENT PLACEMENT N/A 7/12/2016    Procedure: COMBINED ESOPHAGOSCOPY, GASTROSCOPY, DUODENOSCOPY (EGD), PLACE TRANSENDOSCOPIC ESOPHAGEAL STENT;  Surgeon: Jens Wise MD;  [facility]     HC UGI ENDOSCOPY W TRANSENDOSCOPIC STENT PLACEMENT N/A 7/22/2016    Procedure: COMBINED ESOPHAGOSCOPY, GASTROSCOPY, DUODENOSCOPY (EGD), PLACE TRANSENDOSCOPIC ESOPHAGEAL STENT;  Surgeon: Jens Wise MD;  [facility]     INCISION AND DRAINAGE CHEST WASHOUT, COMBINED N/A 5/27/2017    Procedure: COMBINED INCISION AND DRAINAGE CHEST WASHOUT;  Chest washout;  Surgeon: Nalini Barreto MD;  [facility]     INCISION AND DRAINAGE CHEST WASHOUT, COMBINED N/A 5/28/2017    Procedure: COMBINED INCISION AND DRAINAGE CHEST WASHOUT;  Chest washout;  Surgeon: Nalini Barreto MD;  [facility]     IRRIGATION AND DEBRIDEMENT CHEST WASHOUT, COMBINED N/A 6/29/2016    Procedure: COMBINED IRRIGATION AND DEBRIDEMENT CHEST WASHOUT;  Surgeon: Jens Wise MD;  [facility]     IRRIGATION AND DEBRIDEMENT CHEST WASHOUT, COMBINED N/A 5/23/2017    Procedure: COMBINED IRRIGATION AND DEBRIDEMENT CHEST WASHOUT;  COMBINED IRRIGATION AND DEBRIDEMENT CHEST WASHOUT,COMBINED ESOPHAGOSCOPY, GASTROSCOPY, DUODENOSCOPY (EGD) ;  Surgeon: Nalini Barreto MD;  [facility]     IRRIGATION AND DEBRIDEMENT CHEST WASHOUT, COMBINED N/A 5/24/2017    Procedure: COMBINED IRRIGATION AND DEBRIDEMENT CHEST WASHOUT;  Chest wound Washout and Dressing Change;  Surgeon: Nalini Barreto MD;  [facility]     IRRIGATION AND DEBRIDEMENT  CHEST WASHOUT, COMBINED N/A 5/25/2017    Procedure: COMBINED IRRIGATION AND DEBRIDEMENT CHEST WASHOUT;  Irrigation and Debridement Chest Washout and dressing change;  Surgeon: Nalini Barreto MD;  [facility]     IRRIGATION AND DEBRIDEMENT CHEST WASHOUT, COMBINED N/A 5/26/2017    Procedure: COMBINED IRRIGATION AND DEBRIDEMENT CHEST WASHOUT;  Irrigation and Debridement Chest Washout with  dressing change;  Surgeon: Nalini Barreto MD;  [facility]     IRRIGATION AND DEBRIDEMENT CHEST WASHOUT, COMBINED N/A 5/30/2017    Procedure: COMBINED IRRIGATION AND DEBRIDEMENT CHEST WASHOUT;  Irrigation and Debridement Chest Washout , PICC line dressing change;  Surgeon: Nalini Barreto MD;  [facility]     IRRIGATION AND DEBRIDEMENT CHEST WASHOUT, COMBINED N/A 5/31/2017    Procedure: COMBINED IRRIGATION AND DEBRIDEMENT CHEST WASHOUT;  Irrigation and Debridement Chest Washout ;  Surgeon: Nalini Barreto MD;  [facility]     IRRIGATION AND DEBRIDEMENT CHEST WASHOUT, COMBINED N/A 6/1/2017    Procedure: COMBINED IRRIGATION AND DEBRIDEMENT CHEST WASHOUT;  Chest Wound Irrigation And Debridement with dressing change ;  Surgeon: Nalini Barreto MD;  [facility]     IRRIGATION AND DEBRIDEMENT CHEST WASHOUT, COMBINED N/A 6/4/2017    Procedure: COMBINED IRRIGATION AND DEBRIDEMENT CHEST WASHOUT;  COMBINED IRRIGATION AND DEBRIDEMENT CHEST WASHOUT, Esophagoscopy, Gastroscopy, Duodenoscopy (EGD) place transendoscopic esophageal stent,   Pharyngostomy and chest wall tube exchange  C-Arm;  Surgeon: Jens Wise MD;  [facility]     IRRIGATION AND DEBRIDEMENT CHEST WASHOUT, COMBINED N/A 6/2/2017    Procedure: COMBINED IRRIGATION AND DEBRIDEMENT CHEST WASHOUT;  Chest Wound Irrigation and Debridement, Dressing Change;  Surgeon: Nalini Barreto MD;  [facility]     LAPAROSCOPIC ASSISTED INSERTION TUBE JEJUNOSTOMY N/A 4/17/2017    Procedure: LAPAROSCOPIC ASSISTED INSERTION TUBE JEJUNOSTOMY;;  Surgeon: Frandy  "Jens Saul MD;  [facility]     LAPAROSCOPY DIAGNOSTIC (GENERAL) N/A 1/9/2017    Procedure: LAPAROSCOPY DIAGNOSTIC (GENERAL);  Surgeon: Jens Wise MD;  [facility]     LAPAROSCOPY DIAGNOSTIC (GENERAL) N/A 4/17/2017    Procedure: LAPAROSCOPY DIAGNOSTIC (GENERAL);  Laparoscopic , Neck Dissection, Spit Fistula Takedown, Laparoscopic Jejunostomy Tube and Pharyngostomy Tube, Gastric Pull up, Upper Endoscopy(EGD)  , Flexible Bronchoscopy ,Sternotomy ;  Surgeon: Jens Wise MD;  [facility]     LUMPECTOMY BREAST Right 2007     NERVE BLOCK PERIPHERAL N/A 8/30/2016    Procedure: NERVE BLOCK PERIPHERAL;  Surgeon: GENERIC ANESTHESIA PROVIDER;  Location: [facility]     NISSEN FUNDOPLICATION  1/2016     PHARYNGOSTOMY N/A 4/18/2016    Procedure: PHARYNGOSTOMY;  Surgeon: Alexis Barraza MD;  [facility]     PHARYNGOSTOMY N/A 4/25/2016    Procedure: PHARYNGOSTOMY;  Surgeon: Alexis Barraza MD;  [facility]     PHARYNGOSTOMY N/A 5/4/2016    Procedure: PHARYNGOSTOMY;  Surgeon: Alexis Barraza MD;  [facility]     PHARYNGOSTOMY N/A 6/22/2016    Procedure: PHARYNGOSTOMY;  Surgeon: Alexis Barraza MD;  [facility]     PHARYNGOSTOMY N/A 6/29/2016    Procedure: PHARYNGOSTOMY;  Surgeon: Jens Wise MD;  [facility]     PHARYNGOSTOMY N/A 4/21/2017    Procedure: PHARYNGOSTOMY;;  Surgeon: Jens Wise MD;  [facility]     PHARYNGOSTOMY Left 5/31/2017    Procedure: PHARYNGOSTOMY;;  Surgeon: Nalini Barreto MD;  [facility]     PICC INSERTION Left 8/25/2016    5fr DL BioFlo PICC, 42cm (3cm external) in the L medial brachial vein w/ tip in the SVC RA junction.     PICC INSERTION - Rewire Right 05/31/2017    5fr DL BioFlo PICC, 40cm (6cm external) in the R medial brachial vein w/ tip in the SVC RA junction.     THORACOTOMY Right 1998    lung infection - \"hard crust formed on lung\"     THORACOTOMY Left " 1/9/2017    Procedure: THORACOTOMY;  Surgeon: Jens Wise MD;  [facility]     WRIST SURGERY              Previous Endoscopy:   No results found for this or any previous visit.         Social History:   Reviewed and edited as appropriate  Social History     Social History     Marital status:      Spouse name: richard     Number of children: 2     Years of education: N/A     Occupational History     retired       Social History Main Topics     Smoking status: Former Smoker     Packs/day: 1.00     Years: 15.00     Types: Cigarettes     Quit date: 1/1/1998     Smokeless tobacco: Not on file     Alcohol use No     Drug use: No     Sexual activity: Not on file     Other Topics Concern     Not on file     Social History Narrative    Retired realtor.  Lives with  Richard. 2 children alive and well.            Family History:   Reviewed and edited as appropriate  Family History   Problem Relation Age of Onset     Alzheimer Disease Mother      CEREBROVASCULAR DISEASE Father      cerebral hemorrhage     Ovarian Cancer Sister      Breast Cancer Daughter            Allergies:   Reviewed and edited as appropriate     Allergies   Allergen Reactions     Amoxicillin Diarrhea     Ativan [Lorazepam] Other (See Comments)     Hallucinations     Hydromorphone Itching     4/12/17 - patient open to using this as she tolerated Hydromorphone PCA during hospitalization in January 2017.      Morphine Itching            Medications:     Current Facility-Administered Medications   Medication     parenteral nutrition - ADULT compounded formula     saccharomyces boulardii (FLORASTOR) capsule 250 mg     parenteral nutrition - ADULT compounded formula     oxyCODONE (ROXICODONE) solution 10-15 mg     fentaNYL (DURAGESIC) Patch in Place     [START ON 6/12/2017] fentaNYL (DURAGESIC) patch REMOVAL     fentaNYL (DURAGESIC) 50 mcg/hr 72 hr patch 1 patch     lipids (INTRALIPID) 20 % infusion 200 mL      HYDROmorphone (PF) (DILAUDID) injection 0.3-0.5 mg     enoxaparin (LOVENOX) injection 40 mg     simethicone (MYLICON) suspension 40 mg     bacitracin ointment     chlorhexidine (PERIDEX) 0.12 % solution 15 mL     chlorhexidine (HIBICLENS) 4 % liquid     lidocaine (LIDODERM) 5 % Patch 1 patch    And     lidocaine (LIDODERM) patch REMOVAL    And     lidocaine (LIDODERM) patch in PLACE     lidocaine 1 % 0.5-5 mL     lidocaine (LMX4) kit     sodium chloride (PF) 0.9% PF flush 5-50 mL     lidocaine 1 % 1 mL     lidocaine (LMX4) kit     sodium chloride (PF) 0.9% PF flush 10-20 mL     heparin lock flush 10 UNIT/ML injection 5-10 mL     heparin lock flush 10 UNIT/ML injection 5-10 mL     benzocaine-menthol (CHLORASEPTIC) 6-10 MG lozenge 1 lozenge     lidocaine (LMX4) kit     heparin lock flush 10 UNIT/ML injection 2-5 mL     lidocaine 1 % 0.5-5 mL     lidocaine (LMX4) kit     metoprolol (LOPRESSOR) suspension 25 mg     sodium chloride (PF) 0.9% PF flush 10-20 mL     sodium chloride (PF) 0.9% PF flush 10 mL     potassium chloride SA (K-DUR/KLOR-CON M) CR tablet 20-40 mEq     potassium chloride (KLOR-CON) Packet 20-40 mEq     potassium chloride 10 mEq in 100 mL intermittent infusion     potassium chloride 10 mEq in 100 mL intermittent infusion with 10 mg lidocaine     potassium chloride 20 mEq in 50 mL intermittent infusion     magnesium sulfate 2 g in NS intermittent infusion ([employer]ar[employer] or FV Cmpd)     magnesium sulfate 4 g in 100 mL sterile water (premade)     potassium phosphate 15 mmol in D5W 250 mL intermittent infusion     potassium phosphate 20 mmol in D5W 500 mL intermittent infusion     potassium phosphate 20 mmol in D5W 250 mL intermittent infusion     potassium phosphate 25 mmol in D5W 500 mL intermittent infusion     alteplase (CATHFLO ACTIVASE) injection 1-2 mg     lidocaine 1 % 1 mL     lidocaine (LMX4) kit     lidocaine 1 % 1 mL     lidocaine (LMX4) kit     sodium chloride (PF) 0.9% PF flush 3 mL     sodium  chloride (PF) 0.9% PF flush 3 mL     traZODone (DESYREL) tablet 100 mg     diphenhydrAMINE (BENADRYL) capsule 25 mg     fiber modular (NUTRISOURCE FIBER) packet 1 packet     diphenoxylate-atropine (LOMOTIL) liquid 10 mL     acetaminophen (TYLENOL) solution 975 mg     cholestyramine (QUESTRAN) Packet 4 g     gabapentin (NEURONTIN) solution 300 mg     loperamide (IMODIUM) liquid 4 mg     opium tincture 10 MG/ML (1%) liquid 6 mg     pantoprazole (PROTONIX) suspension 40 mg     prochlorperazine (COMPAZINE) tablet 5 mg    Or     prochlorperazine (COMPAZINE) injection 5 mg    Or     prochlorperazine (COMPAZINE) Suppository 12.5 mg     dextrose 10 % 1,000 mL infusion     glucose 40 % gel 15-30 g    Or     dextrose 50 % injection 25-50 mL    Or     glucagon injection 1 mg     artificial saliva (BIOTENE DRY MOUTHWASH) liquid 20 mL     ipratropium - albuterol 0.5 mg/2.5 mg/3 mL (DUONEB) neb solution 3 mL     labetalol (NORMODYNE/TRANDATE) injection 10-40 mg     albuterol (PROAIR HFA/PROVENTIL HFA/VENTOLIN HFA) Inhaler 4 puff     albuterol neb solution 2.5 mg     sodium chloride (PF) 0.9% PF flush 3 mL     sodium chloride (PF) 0.9% PF flush 3 mL     naloxone (NARCAN) injection 0.1-0.4 mg     ondansetron (ZOFRAN-ODT) ODT tab 4 mg    Or     ondansetron (ZOFRAN) injection 4 mg     ferrous sulfate 300 (60 FE) MG/5ML syrup 300 mg     Facility-Administered Medications Ordered in Other Encounters   Medication     bupivacaine 0.25 % - EPINEPHrine 1:200,000 injection             Review of Systems:   A complete review of systems was performed and is negative except as noted in the HPI           Physical Exam:     /64 (BP Location: Left arm)  Pulse 102  Temp 96  F (35.6  C) (Oral)  Resp 16  Ht 1.524 m (5')  Wt 42.2 kg (93 lb)  SpO2 97%  Breastfeeding? No  BMI 18.16 kg/m2  Wt:   Wt Readings from Last 2 Encounters:   06/08/17 42.2 kg (93 lb)   05/18/17 42.9 kg (94 lb 8 oz)        Constitutional: cooperative, no distress,  emaciated.  Eyes: Sclera anicteric/ no pallor  Ears/nose/mouth/throat:  Pharyngostomy tube present. Normal oropharynx without ulcers or exudate, mucus membranes moist,  CV: Normal S1, S2, no murmurs.  Respiratory: clear to auscultation b/l, no murmur, mediastinal chest tube present.  Abd: Nondistended, +bs, nontender, no peritoneal signs, J tube present with no surrounding skin changes.   Skin: warm, perfused, no jaundice  Neuro: AAO x 3, no focal motor sensory deficits           Data:   Labs and imaging below were independently reviewed and interpreted    BMP  Recent Labs  Lab 06/09/17 1707 06/07/17  0712 06/06/17  0709 06/05/17  0659    136 138 138   POTASSIUM 4.2 4.1 4.1 4.0   CHLORIDE 102 103 105 102   ANNABELLE 8.8 8.6 8.6 8.5   CO2 22 26 27 27   BUN 18 18 13 8   CR 0.34* 0.39* 0.40* 0.43*   * 94 94 99     CBC  Recent Labs  Lab 06/09/17 1707 06/07/17  0712 06/06/17  0709 06/05/17  0659   WBC 11.6* 10.0  --  11.6*   RBC 3.21* 2.73*  --  2.88*   HGB 9.3* 7.9*  --  8.1*   HCT 31.1* 25.8*  --  27.2*   MCV 97 95  --  94   MCH 29.0 28.9  --  28.1   MCHC 29.9* 30.6*  --  29.8*   RDW 19.6* 20.3*  --  20.5*   * 391 451* 454*     INR  Recent Labs  Lab 06/05/17  0659   INR 1.15*     LFTs  Recent Labs  Lab 06/05/17  0659   ALKPHOS 324*   AST 31   ALT 25   BILITOTAL 0.5   PROTTOTAL 7.2   ALBUMIN 2.0*      PANCNo lab results found in last 7 days.    Imaging: reviewed

## 2017-06-11 NOTE — PROGRESS NOTES
Thoracic surgery progress note    No acute events overnight. Slept well. Diarrhea unchanged. Pain controlled    Temp: (P) 97.3  F (36.3  C) Temp  Min: 96  F (35.6  C)  Max: 97.3  F (36.3  C)  Resp: 16 Resp  Min: 16  Max: 18  SpO2: 97 % SpO2  Min: 96 %  Max: 97 %  Pulse: 102 Pulse  Min: 90  Max: 102  Heart Rate: (P) 82 Heart Rate  Min: 81  Max: 85  BP: 129/64 Systolic (24hrs), Av , Min:104 , Max:148   Diastolic (24hrs), Av, Min:60, Max:72    A&O, NAD  Unlabored breathing on RA  Pharyngostomy tube site with stable swelling  RRR  Chest wounds granulating well - some greenish staining on the packing   Abd soft, non tender    I&O  UOP 1100  Pharyngostomy 50/0  Chest tube 0/0    A/P: 72F w/ chronic esophageal perforation s/p Nissen at OSH, now s/p esophagectomy with gastric pull-up c/b recurrent anastomotic leak and mediastinal abscess s/p serial washouts, pharyngostomy tube and most recently EGD w/ stent placement on       - Wean off IV pain meds as able - appreciate pain mgmt recs  - Strict NPO. C/w TPN and tube feeds as tolerated (goal 30cc/hr for 18 hours and off 6 hours overnight).   - Chronic diarrhea - max doses of anti-motility agents. GI consulting  - Daily dressing changes  - Lovenox      Discussed w/ thoracic fellow, Dr Chinyere Liu MD  Surgery PGY1  [phone]

## 2017-06-11 NOTE — PLAN OF CARE
Problem: Goal Outcome Summary  Goal: Goal Outcome Summary  Patient has better hours of sleep this shift, still c/o pain in left neck and suture sites in chest, Oxycodone 15 mg po prn given 2x with relief, is able to sleep every after intervention. Drains have no additional outputs. J-tube is clamped. TPN is at 60 ml/hr, Lipids at 16.7 ml/hr via purple port in PICC.  Wound sites are cdi. All due meds given. Uses commode independently, no complaints of having frequent stools. Continue poc.

## 2017-06-12 LAB
ALBUMIN SERPL-MCNC: 2.8 G/DL (ref 3.4–5)
ALP SERPL-CCNC: 434 U/L (ref 40–150)
ALT SERPL W P-5'-P-CCNC: 39 U/L (ref 0–50)
ANION GAP SERPL CALCULATED.3IONS-SCNC: 9 MMOL/L (ref 3–14)
AST SERPL W P-5'-P-CCNC: 30 U/L (ref 0–45)
BILIRUB SERPL-MCNC: 0.3 MG/DL (ref 0.2–1.3)
BUN SERPL-MCNC: 33 MG/DL (ref 7–30)
CALCIUM SERPL-MCNC: 9.2 MG/DL (ref 8.5–10.1)
CHLORIDE SERPL-SCNC: 104 MMOL/L (ref 94–109)
CO2 SERPL-SCNC: 23 MMOL/L (ref 20–32)
CREAT SERPL-MCNC: 0.35 MG/DL (ref 0.52–1.04)
ERYTHROCYTE [DISTWIDTH] IN BLOOD BY AUTOMATED COUNT: 19 % (ref 10–15)
GFR SERPL CREATININE-BSD FRML MDRD: ABNORMAL ML/MIN/1.7M2
GLUCOSE SERPL-MCNC: 96 MG/DL (ref 70–99)
HCT VFR BLD AUTO: 29.8 % (ref 35–47)
HGB BLD-MCNC: 8.9 G/DL (ref 11.7–15.7)
IGA SERPL-MCNC: 292 MG/DL (ref 70–380)
INR PPP: 1.06 (ref 0.86–1.14)
MAGNESIUM SERPL-MCNC: 1.9 MG/DL (ref 1.6–2.3)
MCH RBC QN AUTO: 28.9 PG (ref 26.5–33)
MCHC RBC AUTO-ENTMCNC: 29.9 G/DL (ref 31.5–36.5)
MCV RBC AUTO: 97 FL (ref 78–100)
PHOSPHATE SERPL-MCNC: 3.5 MG/DL (ref 2.5–4.5)
PLATELET # BLD AUTO: 589 10E9/L (ref 150–450)
POTASSIUM SERPL-SCNC: 4 MMOL/L (ref 3.4–5.3)
PREALB SERPL IA-MCNC: 29 MG/DL (ref 15–45)
PROT SERPL-MCNC: 8.2 G/DL (ref 6.8–8.8)
RBC # BLD AUTO: 3.08 10E12/L (ref 3.8–5.2)
SODIUM SERPL-SCNC: 137 MMOL/L (ref 133–144)
TTG IGA SER-ACNC: 1 U/ML
WBC # BLD AUTO: 13.2 10E9/L (ref 4–11)

## 2017-06-12 PROCEDURE — 83520 IMMUNOASSAY QUANT NOS NONAB: CPT | Performed by: INTERNAL MEDICINE

## 2017-06-12 PROCEDURE — A9270 NON-COVERED ITEM OR SERVICE: HCPCS | Mod: GY | Performed by: STUDENT IN AN ORGANIZED HEALTH CARE EDUCATION/TRAINING PROGRAM

## 2017-06-12 PROCEDURE — 25000125 ZZHC RX 250: Performed by: SURGERY

## 2017-06-12 PROCEDURE — 80053 COMPREHEN METABOLIC PANEL: CPT | Performed by: STUDENT IN AN ORGANIZED HEALTH CARE EDUCATION/TRAINING PROGRAM

## 2017-06-12 PROCEDURE — 83993 ASSAY FOR CALPROTECTIN FECAL: CPT | Performed by: INTERNAL MEDICINE

## 2017-06-12 PROCEDURE — 25000128 H RX IP 250 OP 636: Performed by: STUDENT IN AN ORGANIZED HEALTH CARE EDUCATION/TRAINING PROGRAM

## 2017-06-12 PROCEDURE — A9270 NON-COVERED ITEM OR SERVICE: HCPCS | Mod: GY | Performed by: SURGERY

## 2017-06-12 PROCEDURE — 82103 ALPHA-1-ANTITRYPSIN TOTAL: CPT | Performed by: INTERNAL MEDICINE

## 2017-06-12 PROCEDURE — 99233 SBSQ HOSP IP/OBS HIGH 50: CPT | Mod: GC | Performed by: INTERNAL MEDICINE

## 2017-06-12 PROCEDURE — 25000132 ZZH RX MED GY IP 250 OP 250 PS 637: Mod: GY | Performed by: EMERGENCY MEDICINE

## 2017-06-12 PROCEDURE — 83516 IMMUNOASSAY NONANTIBODY: CPT | Performed by: STUDENT IN AN ORGANIZED HEALTH CARE EDUCATION/TRAINING PROGRAM

## 2017-06-12 PROCEDURE — 85027 COMPLETE CBC AUTOMATED: CPT | Performed by: STUDENT IN AN ORGANIZED HEALTH CARE EDUCATION/TRAINING PROGRAM

## 2017-06-12 PROCEDURE — 25000132 ZZH RX MED GY IP 250 OP 250 PS 637: Mod: GY | Performed by: PHYSICIAN ASSISTANT

## 2017-06-12 PROCEDURE — 36592 COLLECT BLOOD FROM PICC: CPT | Performed by: STUDENT IN AN ORGANIZED HEALTH CARE EDUCATION/TRAINING PROGRAM

## 2017-06-12 PROCEDURE — 27210429 ZZH NUTRITION PRODUCT INTERMEDIATE LITER

## 2017-06-12 PROCEDURE — A9270 NON-COVERED ITEM OR SERVICE: HCPCS | Mod: GY | Performed by: EMERGENCY MEDICINE

## 2017-06-12 PROCEDURE — 25000125 ZZHC RX 250: Performed by: PHYSICIAN ASSISTANT

## 2017-06-12 PROCEDURE — 84100 ASSAY OF PHOSPHORUS: CPT | Performed by: STUDENT IN AN ORGANIZED HEALTH CARE EDUCATION/TRAINING PROGRAM

## 2017-06-12 PROCEDURE — 99207 ZZC CDG-CORRECTLY CODED, REVIEWED AND AGREE: CPT | Performed by: INTERNAL MEDICINE

## 2017-06-12 PROCEDURE — 83735 ASSAY OF MAGNESIUM: CPT | Performed by: STUDENT IN AN ORGANIZED HEALTH CARE EDUCATION/TRAINING PROGRAM

## 2017-06-12 PROCEDURE — 25000125 ZZHC RX 250

## 2017-06-12 PROCEDURE — 25000132 ZZH RX MED GY IP 250 OP 250 PS 637: Mod: GY | Performed by: SURGERY

## 2017-06-12 PROCEDURE — 25000132 ZZH RX MED GY IP 250 OP 250 PS 637: Mod: GY | Performed by: STUDENT IN AN ORGANIZED HEALTH CARE EDUCATION/TRAINING PROGRAM

## 2017-06-12 PROCEDURE — 82784 ASSAY IGA/IGD/IGG/IGM EACH: CPT | Performed by: STUDENT IN AN ORGANIZED HEALTH CARE EDUCATION/TRAINING PROGRAM

## 2017-06-12 PROCEDURE — 12000003 ZZH R&B CRITICAL UMMC

## 2017-06-12 PROCEDURE — 84134 ASSAY OF PREALBUMIN: CPT | Performed by: STUDENT IN AN ORGANIZED HEALTH CARE EDUCATION/TRAINING PROGRAM

## 2017-06-12 PROCEDURE — 82705 FATS/LIPIDS FECES QUAL: CPT | Performed by: INTERNAL MEDICINE

## 2017-06-12 PROCEDURE — 85610 PROTHROMBIN TIME: CPT | Performed by: STUDENT IN AN ORGANIZED HEALTH CARE EDUCATION/TRAINING PROGRAM

## 2017-06-12 PROCEDURE — A9270 NON-COVERED ITEM OR SERVICE: HCPCS | Mod: GY | Performed by: PHYSICIAN ASSISTANT

## 2017-06-12 RX ORDER — FENTANYL 75 UG/1
75 PATCH TRANSDERMAL
Status: DISCONTINUED | OUTPATIENT
Start: 2017-06-12 | End: 2017-06-14

## 2017-06-12 RX ORDER — FENTANYL 12.5 UG/1
12 PATCH TRANSDERMAL
Status: DISCONTINUED | OUTPATIENT
Start: 2017-06-12 | End: 2017-06-14

## 2017-06-12 RX ADMIN — LOPERAMIDE HYDROCHLORIDE 4 MG: 1 SOLUTION ORAL at 12:00

## 2017-06-12 RX ADMIN — BACITRACIN ZINC: 500 OINTMENT TOPICAL at 20:36

## 2017-06-12 RX ADMIN — TRAZODONE HYDROCHLORIDE 100 MG: 100 TABLET ORAL at 21:40

## 2017-06-12 RX ADMIN — OXYCODONE HYDROCHLORIDE 15 MG: 5 SOLUTION ORAL at 20:33

## 2017-06-12 RX ADMIN — PANTOPRAZOLE SODIUM 40 MG: 40 TABLET, DELAYED RELEASE ORAL at 07:59

## 2017-06-12 RX ADMIN — CHLORHEXIDINE GLUCONATE 15 ML: 1.2 RINSE ORAL at 08:00

## 2017-06-12 RX ADMIN — SIMETHICONE 40 MG: 20 SUSPENSION/ DROPS ORAL at 06:47

## 2017-06-12 RX ADMIN — Medication 6 MG: at 21:40

## 2017-06-12 RX ADMIN — SODIUM CHLORIDE, PRESERVATIVE FREE 5 ML: 5 INJECTION INTRAVENOUS at 06:56

## 2017-06-12 RX ADMIN — SIMETHICONE 40 MG: 20 SUSPENSION/ DROPS ORAL at 03:18

## 2017-06-12 RX ADMIN — METOPROLOL TARTRATE 25 MG: 100 TABLET ORAL at 20:34

## 2017-06-12 RX ADMIN — Medication 250 MG: at 20:35

## 2017-06-12 RX ADMIN — FENTANYL 1 PATCH: 75 PATCH, EXTENDED RELEASE TRANSDERMAL at 17:16

## 2017-06-12 RX ADMIN — SIMETHICONE 40 MG: 20 SUSPENSION/ DROPS ORAL at 23:14

## 2017-06-12 RX ADMIN — LOPERAMIDE HYDROCHLORIDE 4 MG: 1 SOLUTION ORAL at 20:34

## 2017-06-12 RX ADMIN — Medication 6 MG: at 09:02

## 2017-06-12 RX ADMIN — METOPROLOL TARTRATE 25 MG: 100 TABLET ORAL at 07:59

## 2017-06-12 RX ADMIN — BENZOCAINE: 220 SPRAY, METERED PERIODONTAL at 17:29

## 2017-06-12 RX ADMIN — FENTANYL 1 PATCH: 12 PATCH TRANSDERMAL at 17:16

## 2017-06-12 RX ADMIN — ENOXAPARIN SODIUM 40 MG: 40 INJECTION SUBCUTANEOUS at 12:00

## 2017-06-12 RX ADMIN — Medication 10 ML: at 08:00

## 2017-06-12 RX ADMIN — BACITRACIN ZINC: 500 OINTMENT TOPICAL at 09:01

## 2017-06-12 RX ADMIN — OXYCODONE HYDROCHLORIDE 15 MG: 5 SOLUTION ORAL at 10:24

## 2017-06-12 RX ADMIN — ACETAMINOPHEN 975 MG: 160 SUSPENSION ORAL at 03:18

## 2017-06-12 RX ADMIN — MINERAL SUPPLEMENT IRON 300 MG / 5 ML STRENGTH LIQUID 100 PER BOX UNFLAVORED 300 MG: at 07:59

## 2017-06-12 RX ADMIN — CHLORHEXIDINE GLUCONATE 15 ML: 1.2 RINSE ORAL at 14:00

## 2017-06-12 RX ADMIN — Medication 6 MG: at 03:18

## 2017-06-12 RX ADMIN — Medication 6 MG: at 16:40

## 2017-06-12 RX ADMIN — OXYCODONE HYDROCHLORIDE 15 MG: 5 SOLUTION ORAL at 17:22

## 2017-06-12 RX ADMIN — SIMETHICONE 40 MG: 20 SUSPENSION/ DROPS ORAL at 09:01

## 2017-06-12 RX ADMIN — Medication 2 G: at 10:24

## 2017-06-12 RX ADMIN — GABAPENTIN 300 MG: 250 SOLUTION ORAL at 20:33

## 2017-06-12 RX ADMIN — Medication 10 ML: at 12:00

## 2017-06-12 RX ADMIN — Medication 10 ML: at 16:40

## 2017-06-12 RX ADMIN — OXYCODONE HYDROCHLORIDE 15 MG: 5 SOLUTION ORAL at 03:48

## 2017-06-12 RX ADMIN — POTASSIUM CHLORIDE: 2 INJECTION, SOLUTION, CONCENTRATE INTRAVENOUS at 20:36

## 2017-06-12 RX ADMIN — GABAPENTIN 300 MG: 250 SOLUTION ORAL at 12:00

## 2017-06-12 RX ADMIN — ACETAMINOPHEN 975 MG: 160 SUSPENSION ORAL at 20:34

## 2017-06-12 RX ADMIN — OXYCODONE HYDROCHLORIDE 15 MG: 5 SOLUTION ORAL at 00:46

## 2017-06-12 RX ADMIN — SIMETHICONE 40 MG: 20 SUSPENSION/ DROPS ORAL at 20:37

## 2017-06-12 RX ADMIN — SODIUM CHLORIDE, PRESERVATIVE FREE 5 ML: 5 INJECTION INTRAVENOUS at 17:31

## 2017-06-12 RX ADMIN — Medication 250 MG: at 07:59

## 2017-06-12 RX ADMIN — GABAPENTIN 300 MG: 250 SOLUTION ORAL at 03:17

## 2017-06-12 RX ADMIN — HYDROMORPHONE HYDROCHLORIDE 0.5 MG: 1 INJECTION, SOLUTION INTRAMUSCULAR; INTRAVENOUS; SUBCUTANEOUS at 07:56

## 2017-06-12 RX ADMIN — Medication 10 ML: at 20:34

## 2017-06-12 RX ADMIN — CHLORHEXIDINE GLUCONATE 15 ML: 1.2 RINSE ORAL at 20:34

## 2017-06-12 RX ADMIN — SIMETHICONE 40 MG: 20 SUSPENSION/ DROPS ORAL at 14:00

## 2017-06-12 RX ADMIN — LOPERAMIDE HYDROCHLORIDE 4 MG: 1 SOLUTION ORAL at 16:39

## 2017-06-12 RX ADMIN — OXYCODONE HYDROCHLORIDE 15 MG: 5 SOLUTION ORAL at 06:47

## 2017-06-12 RX ADMIN — LOPERAMIDE HYDROCHLORIDE 4 MG: 1 SOLUTION ORAL at 07:59

## 2017-06-12 NOTE — PROGRESS NOTES
"[employer], [place]   Palliative Care Daily Progress Note          Recommendations, Patient/Family Counseling & Coordination     Pain: Her fentanyl patch was started on 6/9 at 50 mcg/hr. Yesterday, she used 105 mg of oxycodone and 0.5 mg of IV dilaudid - once in the evening. The oxycodone is equivalent to about 157 mg of oral morphine (or about 78 mg of fentanyl). Cutting the 78 mcg in half for incomplete cross tolerance, we would add about 39 mcg of fentanyl to what she is already taking.  50 mcg + 39 mcg = 89 mcg fentanyl.    Recommend increasing the fentanyl patch to 87 mcg/hr.  We will continue to follow her opiate use and make adjustments with you.     Coping: She is intermittently tearful but states \"I was always a crier\".  She is upset that others including her  Enrique are making plans for TCU rehab without her. She is hopeful to try to set up resources to rehab at home. She admits she might be depressed but doesn't want to try any more medications. She is willing to talk to our palliative  for counseling and support. This has been passed along to the rest of our team.    POLST -  None     Thank you for the opportunity to continue to participate in the care of this patient and family.  Please feel free to contact on-call palliative provider with any emergent needs.  We can be reached via team pager [phone] (answered 8-4:30 Monday-Friday); after-hours answering service ([phone]); Main Palliative Clinic - [facility] 1C: [phone].       The patient was discussed with Dr. Bernardo Ramirez MD  Palliative medicine fellow  Beeper [phone]    Attestation:   Patient seen and evaluated with Dr. Ramirez and I agree with/confirm his findings/recs in this note. Total time on the floor involved in the patient's care: 40 minutes. Greater than 50% of my time was spent counseling and/or coordination of care regarding symptoms and goals.   Thank you for " involving us in the patient's care.   Nisha Chang MD / Palliative Medicine / Pager [phone];  [facility] Inpatient Team Consult Pager [phone] (answered 8am-430pm M-F) - ok to text page via [employer] / After-Hours Answering Service [phone] / Palliative Clinic in the [facility] at the [facility] [place] - [phone] (scheduling); [phone] (triage).        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 She gets serial washouts and has an esophageal stent and pharyngostomy tube for drainage. She needs help with coping and more consistent pain control.     2)  Psychosocial/Spiritual Needs:   New: Will have the palliative  come by for counseling and support.          Is new assessment/intervention required by palliative team?:  no         Interval History:   Minerva continues to have severe diarrhea and the GI team has been consulted. On Friday, we started a fentanyl patch at 50 mcg/hr with prn oxycodone and prn IV dilaudid. She states she really hasn't noticed much difference in her pain and feels she needs to take the oxycodone just as much as before. She was also told by her primary team that she will need a longer stint in a TCU than originally planned and Minerva was hoping to set up services and resources to allow her to rehab at home.            Review of Systems:   Palliative Symptom Review (0=no symptom/no concern, 1=mild, 2=moderate, 3=severe):      Pain: 2      Fatigue: 1      Nausea: 0      Constipation: 0      Diarrhea: 2-3      Depressive Symptoms: 2      Anxiety: 1-2      Drowsiness: 1      Poor Appetite: N/A      Shortness of Breath: 1      Insomnia: 1-2      Overall (0 good/no concerns, 3 very poor):  1-2            Medications:   I have reviewed this patient's medication profile and medications given in past 24 hours.  Current  Facility-Administered Medications   Medication     Benzocaine (HURRICAINE/TOPEX) 20 % spray     lipids (INTRALIPID) 20 % infusion 200 mL     fentaNYL (DURAGESIC) Patch in Place     [START ON 6/15/2017] fentaNYL (DURAGESIC) patch REMOVAL     fentaNYL (DURAGESIC) 12 mcg/hr 72 hr patch 1 patch     fentaNYL (DURAGESIC) 75 mcg/hr 72 hr patch 1 patch     parenteral nutrition - ADULT compounded formula     parenteral nutrition - ADULT compounded formula     saccharomyces boulardii (FLORASTOR) capsule 250 mg     oxyCODONE (ROXICODONE) solution 10-15 mg     HYDROmorphone (PF) (DILAUDID) injection 0.3-0.5 mg     enoxaparin (LOVENOX) injection 40 mg     simethicone (MYLICON) suspension 40 mg     bacitracin ointment     chlorhexidine (PERIDEX) 0.12 % solution 15 mL     chlorhexidine (HIBICLENS) 4 % liquid     lidocaine (LIDODERM) 5 % Patch 1 patch    And     lidocaine (LIDODERM) patch REMOVAL    And     lidocaine (LIDODERM) patch in PLACE     lidocaine 1 % 0.5-5 mL     lidocaine (LMX4) kit     sodium chloride (PF) 0.9% PF flush 5-50 mL     lidocaine 1 % 1 mL     lidocaine (LMX4) kit     sodium chloride (PF) 0.9% PF flush 10-20 mL     heparin lock flush 10 UNIT/ML injection 5-10 mL     heparin lock flush 10 UNIT/ML injection 5-10 mL     benzocaine-menthol (CHLORASEPTIC) 6-10 MG lozenge 1 lozenge     lidocaine (LMX4) kit     heparin lock flush 10 UNIT/ML injection 2-5 mL     lidocaine 1 % 0.5-5 mL     lidocaine (LMX4) kit     metoprolol (LOPRESSOR) suspension 25 mg     sodium chloride (PF) 0.9% PF flush 10-20 mL     sodium chloride (PF) 0.9% PF flush 10 mL     potassium chloride SA (K-DUR/KLOR-CON M) CR tablet 20-40 mEq     potassium chloride (KLOR-CON) Packet 20-40 mEq     potassium chloride 10 mEq in 100 mL intermittent infusion     potassium chloride 10 mEq in 100 mL intermittent infusion with 10 mg lidocaine     potassium chloride 20 mEq in 50 mL intermittent infusion     magnesium sulfate 2 g in NS intermittent infusion  (PharMEDium or FV Cmpd)     magnesium sulfate 4 g in 100 mL sterile water (premade)     potassium phosphate 15 mmol in D5W 250 mL intermittent infusion     potassium phosphate 20 mmol in D5W 500 mL intermittent infusion     potassium phosphate 20 mmol in D5W 250 mL intermittent infusion     potassium phosphate 25 mmol in D5W 500 mL intermittent infusion     alteplase (CATHFLO ACTIVASE) injection 1-2 mg     lidocaine 1 % 1 mL     lidocaine (LMX4) kit     lidocaine 1 % 1 mL     lidocaine (LMX4) kit     sodium chloride (PF) 0.9% PF flush 3 mL     sodium chloride (PF) 0.9% PF flush 3 mL     traZODone (DESYREL) tablet 100 mg     diphenhydrAMINE (BENADRYL) capsule 25 mg     fiber modular (NUTRISOURCE FIBER) packet 1 packet     diphenoxylate-atropine (LOMOTIL) liquid 10 mL     acetaminophen (TYLENOL) solution 975 mg     cholestyramine (QUESTRAN) Packet 4 g     gabapentin (NEURONTIN) solution 300 mg     loperamide (IMODIUM) liquid 4 mg     opium tincture 10 MG/ML (1%) liquid 6 mg     pantoprazole (PROTONIX) suspension 40 mg     prochlorperazine (COMPAZINE) tablet 5 mg    Or     prochlorperazine (COMPAZINE) injection 5 mg    Or     prochlorperazine (COMPAZINE) Suppository 12.5 mg     dextrose 10 % 1,000 mL infusion     glucose 40 % gel 15-30 g    Or     dextrose 50 % injection 25-50 mL    Or     glucagon injection 1 mg     artificial saliva (BIOTENE DRY MOUTHWASH) liquid 20 mL     ipratropium - albuterol 0.5 mg/2.5 mg/3 mL (DUONEB) neb solution 3 mL     labetalol (NORMODYNE/TRANDATE) injection 10-40 mg     albuterol (PROAIR HFA/PROVENTIL HFA/VENTOLIN HFA) Inhaler 4 puff     albuterol neb solution 2.5 mg     sodium chloride (PF) 0.9% PF flush 3 mL     sodium chloride (PF) 0.9% PF flush 3 mL     naloxone (NARCAN) injection 0.1-0.4 mg     ondansetron (ZOFRAN-ODT) ODT tab 4 mg    Or     ondansetron (ZOFRAN) injection 4 mg     ferrous sulfate 300 (60 FE) MG/5ML syrup 300 mg     Facility-Administered Medications Ordered in Other  Encounters   Medication     bupivacaine 0.25 % - EPINEPHrine 1:200,000 injection              Physical Exam:   Vitals were reviewed  Temp: 96.1  F (35.6  C) Temp src: Oral BP: 126/67   Heart Rate: 80 Resp: 16 SpO2: 96 % O2 Device: None (Room air)    Gen: Appears comfortable sitting in a chair, occasionally tearful.  CV: RRR without murmur.  Resp: normal work of breathing, no wheezing.  Abd: soft, minimal tenderness, nd, +BS   Msk: no gross deformity but notable sarcopenia  Skin:  no jaundice  Ext: warm, well perfused. no edema  Neuro:  EOM, vision, Speech fluent. Moves all extremities equally  Mental status/Psych: alert. Oriented. Affect is occasionally sad/tearful             Data Reviewed:   Lab Results   Component Value Date    WBC 13.2 06/12/2017     Lab Results   Component Value Date    RBC 3.08 06/12/2017     Lab Results   Component Value Date    HGB 8.9 06/12/2017     Lab Results   Component Value Date    HCT 29.8 06/12/2017     Lab Results   Component Value Date    MCV 97 06/12/2017     Lab Results   Component Value Date    MCH 28.9 06/12/2017     Lab Results   Component Value Date    MCHC 29.9 06/12/2017     Lab Results   Component Value Date    RDW 19.0 06/12/2017     Lab Results   Component Value Date     06/12/2017

## 2017-06-12 NOTE — PLAN OF CARE
Problem: Individualization  Goal: Patient Preferences  Outcome: No Change  Vitals:     06/11/17 0800 06/11/17 1556 06/11/17 2000 06/11/17 2121   BP: 126/70 120/68 128/77 131/60   BP Location:   Left arm   Left arm   Cuff Size:           Pulse:           Resp: 18 16 18 16   Temp: 97.3  F (36.3  C) 96.9  F (36.1  C) 97  F (36.1  C) 96.4  F (35.8  C)   TempSrc:   Oral   Oral   SpO2: 97% 98% 96% 94%   Weight:           Height:             VSS, gave oxycodone every 3 hours per patient request, voiding and having loose bowel movements independently in commode, TPN and lipids infusing, pharyngostomy to LIS with about 200 ml of green colored output, chest drain to gravity with no output to ojeda bag, medications put down J-tube, continue with plan of care

## 2017-06-12 NOTE — PLAN OF CARE
Problem: Goal Outcome Summary  Goal: Goal Outcome Summary  Vitals:     06/10/17 2156 06/11/17 0800 06/11/17 1556 06/11/17 2000   BP: 129/64 126/70 120/68 128/77   BP Location: Left arm   Left arm     Cuff Size:           Pulse:           Resp: 16 18 16 18   Temp: 96  F (35.6  C) 97.3  F (36.3  C) 96.9  F (36.1  C) 97  F (36.1  C)   TempSrc: Oral   Oral     SpO2: 97% 97% 98% 96%   Weight:           Height:           afebrile vital stable, sating 96% on room air non labor breathing, lungs clear, chest incision dressing changed by MD,   Having multiple  loose stool, tube feed running at 25 cc till 2000, Pharyngostomy tube puts out 200 cc,   Chest tube to gravity with no out put, up ambulated, TPN at 60 cc, Oxy and dilaudid alternated for pain.  Gastroenterology consult done today, continue with plan of care.

## 2017-06-12 NOTE — PROGRESS NOTES
Thoracic surgery progress note    No acute events overnight. Slept well. Diarrhea unchanged after DC of abx last week.  Does c/o sore throat and pain at pharyngostomy retention suture sites.     Temp: 96.1  F (35.6  C) Temp  Min: 96.1  F (35.6  C)  Max: 97  F (36.1  C)  Resp: 16 Resp  Min: 16  Max: 18  SpO2: 96 % SpO2  Min: 94 %  Max: 98 %    No Data Recorded  Heart Rate: 80 Heart Rate  Min: 78  Max: 86  BP: 126/67 Systolic (24hrs), Av , Min:120 , Max:131   Diastolic (24hrs), Av, Min:60, Max:77    A&O, NAD  Unlabored breathing on RA  Pharyngostomy tube site with stable swelling, improved from several days ago  Chest wounds granulating well - some greenish staining on the packing (bilious vs normal colonized wound)   Abd soft, non tender    I&O  UOP: urine mixed with stool charted 1300, unclear exact UOP  Pharyngostomy 200  Chest tube 0/0    A/P: 72F w/ chronic esophageal perforation s/p Nissen at OSH, now s/p esophagectomy with gastric pull-up c/b recurrent anastomotic leak and mediastinal abscess s/p serial washouts, pharyngostomy tube and most recently EGD w/ stent placement on .      - Hurricaine spray today for sore throat   - Pharyngostomy retention sutures in chest removed, ensure flexi-track in place at all times  - Wean off IV pain meds as able - appreciate pain mgmt recs  - Strict NPO. C/w TPN and tube feeds as tolerated (goal 30cc/hr for 18 hours and off 6 hours overnight).   - Chronic diarrhea - max doses of anti-motility agents. GI consulting  - Daily dressing changes  - Deja Rogers PA-C  P: [phone]

## 2017-06-13 ENCOUNTER — APPOINTMENT (OUTPATIENT)
Dept: GENERAL RADIOLOGY | Facility: CLINIC | Age: 72
DRG: 856 | End: 2017-06-13
Attending: STUDENT IN AN ORGANIZED HEALTH CARE EDUCATION/TRAINING PROGRAM
Payer: MEDICARE

## 2017-06-13 ENCOUNTER — APPOINTMENT (OUTPATIENT)
Dept: OCCUPATIONAL THERAPY | Facility: CLINIC | Age: 72
DRG: 856 | End: 2017-06-13
Attending: STUDENT IN AN ORGANIZED HEALTH CARE EDUCATION/TRAINING PROGRAM
Payer: MEDICARE

## 2017-06-13 LAB
FAT STL QL: NORMAL
NEUTRAL FAT STL QL: NORMAL

## 2017-06-13 PROCEDURE — A9270 NON-COVERED ITEM OR SERVICE: HCPCS | Mod: GY | Performed by: SURGERY

## 2017-06-13 PROCEDURE — 40000558 ZZH STATISTIC CVC DRESSING CHANGE

## 2017-06-13 PROCEDURE — 25000132 ZZH RX MED GY IP 250 OP 250 PS 637: Mod: GY | Performed by: SURGERY

## 2017-06-13 PROCEDURE — 99233 SBSQ HOSP IP/OBS HIGH 50: CPT | Mod: GC | Performed by: INTERNAL MEDICINE

## 2017-06-13 PROCEDURE — A9270 NON-COVERED ITEM OR SERVICE: HCPCS | Mod: GY | Performed by: STUDENT IN AN ORGANIZED HEALTH CARE EDUCATION/TRAINING PROGRAM

## 2017-06-13 PROCEDURE — 25000128 H RX IP 250 OP 636: Performed by: STUDENT IN AN ORGANIZED HEALTH CARE EDUCATION/TRAINING PROGRAM

## 2017-06-13 PROCEDURE — 97535 SELF CARE MNGMENT TRAINING: CPT | Mod: GO

## 2017-06-13 PROCEDURE — 25000132 ZZH RX MED GY IP 250 OP 250 PS 637: Mod: GY | Performed by: INTERNAL MEDICINE

## 2017-06-13 PROCEDURE — 40000133 ZZH STATISTIC OT WARD VISIT

## 2017-06-13 PROCEDURE — 25000125 ZZHC RX 250: Performed by: STUDENT IN AN ORGANIZED HEALTH CARE EDUCATION/TRAINING PROGRAM

## 2017-06-13 PROCEDURE — 25000125 ZZHC RX 250: Performed by: PHYSICIAN ASSISTANT

## 2017-06-13 PROCEDURE — 99207 ZZC CDG-CORRECTLY CODED, REVIEWED AND AGREE: CPT | Performed by: INTERNAL MEDICINE

## 2017-06-13 PROCEDURE — 25000132 ZZH RX MED GY IP 250 OP 250 PS 637: Mod: GY | Performed by: STUDENT IN AN ORGANIZED HEALTH CARE EDUCATION/TRAINING PROGRAM

## 2017-06-13 PROCEDURE — A9270 NON-COVERED ITEM OR SERVICE: HCPCS | Mod: GY | Performed by: INTERNAL MEDICINE

## 2017-06-13 PROCEDURE — 25000125 ZZHC RX 250: Performed by: SURGERY

## 2017-06-13 PROCEDURE — A9270 NON-COVERED ITEM OR SERVICE: HCPCS | Mod: GY | Performed by: PHYSICIAN ASSISTANT

## 2017-06-13 PROCEDURE — 97110 THERAPEUTIC EXERCISES: CPT | Mod: GO

## 2017-06-13 PROCEDURE — 74000 XR ABDOMEN 1 VW: CPT

## 2017-06-13 PROCEDURE — 12000003 ZZH R&B CRITICAL UMMC

## 2017-06-13 PROCEDURE — 25000132 ZZH RX MED GY IP 250 OP 250 PS 637: Mod: GY | Performed by: PHYSICIAN ASSISTANT

## 2017-06-13 RX ORDER — GABAPENTIN 250 MG/5ML
300 SOLUTION ORAL 3 TIMES DAILY
Status: DISCONTINUED | OUTPATIENT
Start: 2017-06-14 | End: 2017-06-14

## 2017-06-13 RX ORDER — OXYCODONE HCL 5 MG/5 ML
10-20 SOLUTION, ORAL ORAL
Status: DISCONTINUED | OUTPATIENT
Start: 2017-06-13 | End: 2017-06-29 | Stop reason: HOSPADM

## 2017-06-13 RX ADMIN — ACETAMINOPHEN 975 MG: 160 SUSPENSION ORAL at 09:32

## 2017-06-13 RX ADMIN — SIMETHICONE 40 MG: 20 SUSPENSION/ DROPS ORAL at 01:03

## 2017-06-13 RX ADMIN — LOPERAMIDE HYDROCHLORIDE 4 MG: 1 SOLUTION ORAL at 20:25

## 2017-06-13 RX ADMIN — CHLORHEXIDINE GLUCONATE 15 ML: 1.2 RINSE ORAL at 13:01

## 2017-06-13 RX ADMIN — OXYCODONE HYDROCHLORIDE 15 MG: 5 SOLUTION ORAL at 10:19

## 2017-06-13 RX ADMIN — GABAPENTIN 300 MG: 250 SOLUTION ORAL at 20:21

## 2017-06-13 RX ADMIN — MINERAL SUPPLEMENT IRON 300 MG / 5 ML STRENGTH LIQUID 100 PER BOX UNFLAVORED 300 MG: at 09:32

## 2017-06-13 RX ADMIN — LOPERAMIDE HYDROCHLORIDE 4 MG: 1 SOLUTION ORAL at 16:40

## 2017-06-13 RX ADMIN — ACETAMINOPHEN 975 MG: 160 SUSPENSION ORAL at 01:03

## 2017-06-13 RX ADMIN — Medication 6 MG: at 09:31

## 2017-06-13 RX ADMIN — BACITRACIN ZINC: 500 OINTMENT TOPICAL at 09:32

## 2017-06-13 RX ADMIN — CHLORHEXIDINE GLUCONATE 15 ML: 1.2 RINSE ORAL at 09:32

## 2017-06-13 RX ADMIN — PANTOPRAZOLE SODIUM 40 MG: 40 TABLET, DELAYED RELEASE ORAL at 09:30

## 2017-06-13 RX ADMIN — Medication 6 MG: at 21:28

## 2017-06-13 RX ADMIN — Medication 250 MG: at 20:21

## 2017-06-13 RX ADMIN — OXYCODONE HYDROCHLORIDE 15 MG: 5 SOLUTION ORAL at 06:44

## 2017-06-13 RX ADMIN — OXYCODONE HYDROCHLORIDE 20 MG: 5 SOLUTION ORAL at 20:21

## 2017-06-13 RX ADMIN — Medication 10 ML: at 16:34

## 2017-06-13 RX ADMIN — ENOXAPARIN SODIUM 40 MG: 40 INJECTION SUBCUTANEOUS at 13:01

## 2017-06-13 RX ADMIN — OXYCODONE HYDROCHLORIDE 15 MG: 5 SOLUTION ORAL at 13:29

## 2017-06-13 RX ADMIN — Medication 10 ML: at 20:22

## 2017-06-13 RX ADMIN — METOPROLOL TARTRATE 25 MG: 100 TABLET ORAL at 09:32

## 2017-06-13 RX ADMIN — Medication 10 ML: at 09:30

## 2017-06-13 RX ADMIN — OXYCODONE HYDROCHLORIDE 15 MG: 5 SOLUTION ORAL at 01:03

## 2017-06-13 RX ADMIN — TRAZODONE HYDROCHLORIDE 100 MG: 100 TABLET ORAL at 21:28

## 2017-06-13 RX ADMIN — SODIUM CHLORIDE, PRESERVATIVE FREE 5 ML: 5 INJECTION INTRAVENOUS at 16:34

## 2017-06-13 RX ADMIN — LOPERAMIDE HYDROCHLORIDE 4 MG: 1 SOLUTION ORAL at 09:31

## 2017-06-13 RX ADMIN — SIMETHICONE 40 MG: 20 SUSPENSION/ DROPS ORAL at 09:31

## 2017-06-13 RX ADMIN — SIMETHICONE 40 MG: 20 SUSPENSION/ DROPS ORAL at 13:01

## 2017-06-13 RX ADMIN — LOPERAMIDE HYDROCHLORIDE 4 MG: 1 SOLUTION ORAL at 13:02

## 2017-06-13 RX ADMIN — GABAPENTIN 300 MG: 250 SOLUTION ORAL at 03:48

## 2017-06-13 RX ADMIN — Medication 6 MG: at 16:34

## 2017-06-13 RX ADMIN — CHOLESTYRAMINE 4 G: 4 POWDER, FOR SUSPENSION ORAL at 09:32

## 2017-06-13 RX ADMIN — Medication 10 ML: at 13:02

## 2017-06-13 RX ADMIN — BENZOCAINE: 220 SPRAY, METERED PERIODONTAL at 09:49

## 2017-06-13 RX ADMIN — SIMETHICONE 40 MG: 20 SUSPENSION/ DROPS ORAL at 06:45

## 2017-06-13 RX ADMIN — Medication 6 MG: at 03:48

## 2017-06-13 RX ADMIN — SIMETHICONE 40 MG: 20 SUSPENSION/ DROPS ORAL at 17:59

## 2017-06-13 RX ADMIN — Medication 250 MG: at 09:33

## 2017-06-13 RX ADMIN — SIMETHICONE 40 MG: 20 SUSPENSION/ DROPS ORAL at 21:28

## 2017-06-13 RX ADMIN — OXYCODONE HYDROCHLORIDE 15 MG: 5 SOLUTION ORAL at 03:48

## 2017-06-13 RX ADMIN — POTASSIUM CHLORIDE: 2 INJECTION, SOLUTION, CONCENTRATE INTRAVENOUS at 20:41

## 2017-06-13 RX ADMIN — ACETAMINOPHEN 975 MG: 160 SUSPENSION ORAL at 16:34

## 2017-06-13 RX ADMIN — OXYCODONE HYDROCHLORIDE 20 MG: 5 SOLUTION ORAL at 16:34

## 2017-06-13 RX ADMIN — METOPROLOL TARTRATE 25 MG: 100 TABLET ORAL at 20:22

## 2017-06-13 RX ADMIN — GABAPENTIN 300 MG: 250 SOLUTION ORAL at 13:02

## 2017-06-13 NOTE — PLAN OF CARE
Problem: Goal Outcome Summary  Goal: Goal Outcome Summary  OT/7B: Facilitated ADLs for increased independence within precautions. Pt mod I with LB dressing, required assist for UB dressing for line management. Ind with verbalizing precautions, demonstrates independent carryover during functional activities/ADLs without VCs. Pt mod I for toilet transfer with bedside commode, ind with zenia cares. For increased strength/activity tolerance needed to complete ADLs, pt completed functional mobility with no AD. Initially pushing IV pole, able to progress to no IV pole independent to complete >1000 ft. Educated pt on importance of continued physical activity 3-4x/day as able to continue progressing strength/activity tolerance. Pt is independent and demonstrates no safety deficits during ambulation. Pt is safe to ambulate without assist to increase OOB activity. Pt has met all therapy goals at this time and is able to continue progressing independently. Does not demonstrate need for continued therapy services at this time as goals are met, will discharge from therapy. Please re-consult if change in medical status.     Recommendation: Home with assist from spouse as needed when medically stable.     Occupational Therapy Discharge Summary     Reason for therapy discharge:    All goals and outcomes met, no further needs identified.     Progress towards therapy goal(s). See goals on Care Plan in [facility] electronic health record for goal details.  Goals met     Therapy recommendation(s):    Continue home exercise program.

## 2017-06-13 NOTE — PROGRESS NOTES
[facility], [place]   Palliative Care Daily Progress Note          Recommendations, Patient/Family Counseling & Coordination     Pain: Minerva continued to need oxycodone taking 5 doses (15 mg each) from midnight until 1 pm today. She does state she feels some improvement in her pain. We will watch her opiate usage over the next 24 hours before making any additional changes.   - continue fentanyl 87mcg patch  - increase oxycodone 10-20mg PO q3hrs PRN  - continue scheduled tylenol 975mg TID  - continue gabapentin 300mg TID and will consider increasing it tomorrow by 300mg and then 300mg every 3rd day if tolerating it without side effects of sig fatigue and / or lightheadedness.       Coping: She continues to be frustrated by her prolonged hospitalization. She found the support from our palliative  helpful. We will continue to offer these visits to help with coping strategies.     POLST none     Thank you for the opportunity to continue to participate in the care of this patient and family.  Please feel free to contact on-call palliative provider with any emergent needs.  We can be reached via team pager [phone] (answered 8-4:30 Monday-Friday); after-hours answering service ([phone]); Main Palliative Clinic - [facility] 1C: [phone].       The patient was discussed with Dr. Bernardo Ramirez MD  Palliative medicine fellow  Beeper [phone]    Attestation:   Patient seen and evaluated with Dr. Ramirez and I agree with/confirm his findings/recs in this note. Total time on the floor involved in the patient's care: 35 minutes. Greater than 50% of my time was spent counseling and/or coordination of care regarding symptoms and goals.   Thank you for involving us in the patient's care.   Nisha Chang MD / Palliative Medicine / Pager [phone];  [facility] Inpatient Team Consult Pager [phone] (answered 8am-430pm M-F) - ok to text page via [employer] /  After-Hours Answering Service [phone] / Palliative Clinic in the [facility] at the [facility] [place] - [phone] (scheduling); [phone] (triage).        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 She gets serial washouts and has an esophageal stent and pharyngostomy tube for drainage. She needs help with coping and more consistent pain control.     2)  Psychosocial/Spiritual Needs:   Ongoing          Is new assessment/intervention required by palliative team?:  no         Interval History:   Minerva seems to be in better spirits today. She continues to have diarrhea and the GI team is looking into this. Last night, her fentanyl patch was increased to 87 mcg/hr. She is starting to feel a difference today and admits she slept well last night. She met our palliative  yesterday and appreciated her visit.            Review of Systems:   Palliative Symptom Review (0=no symptom/no concern, 1=mild, 2=moderate, 3=severe):      Pain: 1-2      Fatigue: 1      Nausea: 0      Constipation: 0      Diarrhea: 2-3      Depressive Symptoms: 2      Anxiety: 1-2      Drowsiness: 1      Poor Appetite: N/A      Shortness of Breath: 1      Insomnia: 1      Overall (0 good/no concerns, 3 very poor):  1-2            Medications:   I have reviewed this patient's medication profile and medications given in past 24 hours.  Current Facility-Administered Medications   Medication     diatrizoate meglumine-sodium (GASTROGRAFIN; GASTROVIEW) 66-10 % solution 240 mL     parenteral nutrition - ADULT compounded formula     oxyCODONE (ROXICODONE) solution 10-20 mg     Benzocaine (HURRICAINE/TOPEX) 20 % spray     lipids (INTRALIPID) 20 % infusion 200 mL     fentaNYL (DURAGESIC) Patch in Place     [START ON 6/15/2017] fentaNYL (DURAGESIC) patch REMOVAL     fentaNYL (DURAGESIC) 12 mcg/hr 72 hr  patch 1 patch     fentaNYL (DURAGESIC) 75 mcg/hr 72 hr patch 1 patch     parenteral nutrition - ADULT compounded formula     saccharomyces boulardii (FLORASTOR) capsule 250 mg     HYDROmorphone (PF) (DILAUDID) injection 0.3-0.5 mg     enoxaparin (LOVENOX) injection 40 mg     simethicone (MYLICON) suspension 40 mg     bacitracin ointment     chlorhexidine (PERIDEX) 0.12 % solution 15 mL     chlorhexidine (HIBICLENS) 4 % liquid     lidocaine (LIDODERM) 5 % Patch 1 patch    And     lidocaine (LIDODERM) patch REMOVAL    And     lidocaine (LIDODERM) patch in PLACE     lidocaine 1 % 0.5-5 mL     lidocaine (LMX4) kit     sodium chloride (PF) 0.9% PF flush 5-50 mL     lidocaine 1 % 1 mL     lidocaine (LMX4) kit     sodium chloride (PF) 0.9% PF flush 10-20 mL     heparin lock flush 10 UNIT/ML injection 5-10 mL     heparin lock flush 10 UNIT/ML injection 5-10 mL     benzocaine-menthol (CHLORASEPTIC) 6-10 MG lozenge 1 lozenge     heparin lock flush 10 UNIT/ML injection 2-5 mL     metoprolol (LOPRESSOR) suspension 25 mg     sodium chloride (PF) 0.9% PF flush 10-20 mL     sodium chloride (PF) 0.9% PF flush 10 mL     potassium chloride SA (K-DUR/KLOR-CON M) CR tablet 20-40 mEq     potassium chloride (KLOR-CON) Packet 20-40 mEq     potassium chloride 10 mEq in 100 mL intermittent infusion     potassium chloride 10 mEq in 100 mL intermittent infusion with 10 mg lidocaine     potassium chloride 20 mEq in 50 mL intermittent infusion     magnesium sulfate 2 g in NS intermittent infusion (PharMEDium or FV Cmpd)     magnesium sulfate 4 g in 100 mL sterile water (premade)     potassium phosphate 15 mmol in D5W 250 mL intermittent infusion     potassium phosphate 20 mmol in D5W 500 mL intermittent infusion     potassium phosphate 20 mmol in D5W 250 mL intermittent infusion     potassium phosphate 25 mmol in D5W 500 mL intermittent infusion     sodium chloride (PF) 0.9% PF flush 3 mL     sodium chloride (PF) 0.9% PF flush 3 mL      traZODone (DESYREL) tablet 100 mg     diphenhydrAMINE (BENADRYL) capsule 25 mg     fiber modular (NUTRISOURCE FIBER) packet 1 packet     diphenoxylate-atropine (LOMOTIL) liquid 10 mL     acetaminophen (TYLENOL) solution 975 mg     cholestyramine (QUESTRAN) Packet 4 g     gabapentin (NEURONTIN) solution 300 mg     loperamide (IMODIUM) liquid 4 mg     opium tincture 10 MG/ML (1%) liquid 6 mg     pantoprazole (PROTONIX) suspension 40 mg     prochlorperazine (COMPAZINE) tablet 5 mg    Or     prochlorperazine (COMPAZINE) injection 5 mg    Or     prochlorperazine (COMPAZINE) Suppository 12.5 mg     dextrose 10 % 1,000 mL infusion     glucose 40 % gel 15-30 g    Or     dextrose 50 % injection 25-50 mL    Or     glucagon injection 1 mg     artificial saliva (BIOTENE DRY MOUTHWASH) liquid 20 mL     ipratropium - albuterol 0.5 mg/2.5 mg/3 mL (DUONEB) neb solution 3 mL     labetalol (NORMODYNE/TRANDATE) injection 10-40 mg     albuterol (PROAIR HFA/PROVENTIL HFA/VENTOLIN HFA) Inhaler 4 puff     albuterol neb solution 2.5 mg     sodium chloride (PF) 0.9% PF flush 3 mL     sodium chloride (PF) 0.9% PF flush 3 mL     naloxone (NARCAN) injection 0.1-0.4 mg     ondansetron (ZOFRAN-ODT) ODT tab 4 mg    Or     ondansetron (ZOFRAN) injection 4 mg     ferrous sulfate 300 (60 FE) MG/5ML syrup 300 mg     Facility-Administered Medications Ordered in Other Encounters   Medication     bupivacaine 0.25 % - EPINEPHrine 1:200,000 injection              Physical Exam:   Vitals were reviewed  Temp: 97.1  F (36.2  C) Temp src: Oral BP: 124/60   Heart Rate: 81 Resp: 16 SpO2: 100 % O2 Device: None (Room air)    Gen: Appears comfortable sitting in a chair, good spirits.  CV: RRR without murmur.  Resp: normal work of breathing, no wheezing.  Abd: soft, minimal tenderness, nd, +BS   Msk: no gross deformity   Skin:  no jaundice  Ext: warm, well perfused. no edema  Neuro:  EOM, vision, Speech fluent. Moves all extremities equally  Mental status/Psych:  alert. Oriented. Affect is normal             Data Reviewed:   none

## 2017-06-13 NOTE — PLAN OF CARE
Problem: Goal Outcome Summary  Goal: Goal Outcome Summary  Outcome: No Change  VSS. Dressing to chest CDI. Chest tube to gravity drainage, very little serosang output. J tube with TF as pt tolerates, pt ran TF from about 1630 to about 2130 at 15ml/hr. meds thru J tube. Pharyngostomy tube in place with flex track, cares completed. To LIS, about 150ml green output. Voiding and frequent BMs to commode. Prn oxycodone and scheduled tylenol. Prn hurricane spray for throat discomfort. Fentanyl patch increased, place to right upper back. TPN to PICC. Up ind in room. Went for walk with  this afternoon. Xray of small bowel tomorrow.

## 2017-06-13 NOTE — PLAN OF CARE
Problem: Goal Outcome Summary  Goal: Goal Outcome Summary  Patient still has pain in chest area, Oxycodone 15 mg prn 2x given with relief, sleeps in between cares. Diminished LS, denies SOB. +BS, no loose stool noted this shift, only urine in commode adequate amount. TPN at 60 ml/hr via PICC. P-tube is intact, no complaints of discomfort from the site, to suction, minimal output. Chest drain to gravity, no output. All meds to J-tube, flushed and clamped after administration. Wound dressings cdi. Continue poc.

## 2017-06-13 NOTE — PROGRESS NOTES
GASTROENTEROLOGY PROGRESS NOTE      Date of Admission:  5/18/2017          ASSESSMENT AND RECOMMENDATIONS:   Assessment:  Minerva Blanco is a 72 year old female with a history of chronic esophageal perforation, Nissen procedure, most recently esophageal repair with retrosternal gastric pull through (4/17/2017), c/b recurrent anastomotic leak, mediastinal abscess s/p serial washout, pharyngostomy tube, EGD with stent placement (6/4/2017), spit fistula take down, J tube placement in April 2017, admitted in hospital. GI consulted for persistent diarrhea. Normal tTG and total IgA levels.    This diarrhea is likely multifactorial, tube feeds could be contributing, and we need to confirm the location to J tube. Unlikely infectious, as C. Diff negative. Other possibilities are inflammatory, other infections, and medications side effects.      Recommendation  -- Recommend small bowel follow through the J tube to determine the location of J tube and if not possible, please consider getting an abdominal/pelvic CT with contrast given through J-tube  -- Pending stool studies - fecal fat, elastase, alpha 1 antitrypsin, and calprotectin  -- Accurate documentation of stool output.   -- If following workup comes back negative, will consider colonoscopy.       Gastroenterology follow up recommendations: To be determined    Patient care plan discussed with Dr. Peres, GI staff physician. Thank you for involving us in this patient's care. Please do not hesitate to contact the GI service with any questions or concerns.     Chiki Braun CNP  Department of Gastroenterology   -------------------------------------------------------------------------------------------------------------------   History is obtained from the patient and the medical record.          History of Present Illness:   Minerva Blanco is a 72 year old female with a history of  chronic esophageal perforation, Nissen procedure, most recently  esophagectomy with retrosternal gastric pull through (4/17/2017), c/b recurrent anastomotic leak, mediastinal abscess s/p serial washout, pharyngostomy tube, EGD w stent placement (6/4/2017), spit fistula take down, J tube placement in April 2017, admitted in hospital. GI consulted for persistent diarrhea.  According to patient she has h/o IBS before and was well controlled but since her surgery on April 17 she is having persistent diarrhea. She describes it as watery around 10 -15 BM/d, no blood, even nighttime symptoms and incontinence sometimes. She is having tube feeds through surgically placed J tube and also getting most of her nutrition by TPN. She feels much better on days when tube feeds were stopped. Currently on 25 cc/hr, still having diarrhea. She has been on and off multiple antibiotics, but Cdiff testing has been negative. She is currently on Lomotil, opium tincture with no major benefit. She did have J tube even before for past 1 year but did not have any problem before.             Past Medical History:   Reviewed and edited as appropriate  Past Medical History:   Diagnosis Date     Atrial fibrillation (H)      Breast cancer (H) 2007    right side - lumpectomy, chemo, and local radiation     Chronic infection     infection/wound for two years after esoph surgery     Esophageal perforation      Fibromyalgia      GERD (gastroesophageal reflux disease)      Hiatal hernia      History of blood transfusion      IBS (irritable bowel syndrome)      Meniere's disease     deaf left ear     MS (multiple sclerosis) (H)      Noninfectious ileitis      Other chronic pain             Past Surgical History:   Reviewed and edited as appropriate   Past Surgical History:   Procedure Laterality Date     APPENDECTOMY       BACK SURGERY  5/1/2015    lumbar fusion     BRONCHOSCOPY FLEXIBLE N/A 4/17/2017    Procedure: BRONCHOSCOPY FLEXIBLE;;  Surgeon: Jens Wise MD;  Location: [facility]     C GASTROSTOMY/JEJUN  TUBE      J tube for feedings     CLOSE SPIT FISTULA N/A 4/17/2017    Procedure: CLOSE SPIT FISTULA;;  Surgeon: Jens Wise MD;  [facility]     COMBINED ESOPHAGOSCOPY, GASTROSCOPY, DUODENOSCOPY (EGD), REMOVE ESOPHAGEAL STENT N/A 8/26/2016    Procedure: COMBINED ESOPHAGOSCOPY, GASTROSCOPY, DUODENOSCOPY (EGD), REMOVE ESOPHAGEAL STENT;  Surgeon: Jens Wise MD;  [facility]     CREATE SPIT FISTULA N/A 1/9/2017    Procedure: CREATE SPIT FISTULA;  Surgeon: Jens Wise MD;  [facility]     ESOPHAGECTOMY N/A 1/9/2017    Procedure: ESOPHAGECTOMY;  Surgeon: Jens Wise MD;  [facility]     ESOPHAGOSCOPY, GASTROSCOPY, DUODENOSCOPY (EGD), COMBINED N/A 4/18/2016    Procedure: COMBINED ESOPHAGOSCOPY, GASTROSCOPY, DUODENOSCOPY (EGD);  Surgeon: Alexis Barraza MD;  [facility]     ESOPHAGOSCOPY, GASTROSCOPY, DUODENOSCOPY (EGD), COMBINED N/A 4/25/2016    Procedure: COMBINED ESOPHAGOSCOPY, GASTROSCOPY, DUODENOSCOPY (EGD);  Surgeon: Alexis Barraza MD;  [facility]     ESOPHAGOSCOPY, GASTROSCOPY, DUODENOSCOPY (EGD), COMBINED N/A 5/4/2016    Procedure: COMBINED ESOPHAGOSCOPY, GASTROSCOPY, DUODENOSCOPY (EGD);  Surgeon: Alexis Barraza MD;  [facility]     ESOPHAGOSCOPY, GASTROSCOPY, DUODENOSCOPY (EGD), COMBINED N/A 5/18/2016    Procedure: COMBINED ESOPHAGOSCOPY, GASTROSCOPY, DUODENOSCOPY (EGD);  Surgeon: Alexis Barraza MD;  [facility]     ESOPHAGOSCOPY, GASTROSCOPY, DUODENOSCOPY (EGD), COMBINED N/A 6/22/2016    Procedure: COMBINED ESOPHAGOSCOPY, GASTROSCOPY, DUODENOSCOPY (EGD);  Surgeon: Alexis Barraza MD;  [facility]     ESOPHAGOSCOPY, GASTROSCOPY, DUODENOSCOPY (EGD), COMBINED N/A 7/12/2016    Procedure: COMBINED ESOPHAGOSCOPY, GASTROSCOPY, DUODENOSCOPY (EGD);  Surgeon: Jens Wise MD;  [facility]     ESOPHAGOSCOPY, GASTROSCOPY, DUODENOSCOPY (EGD), COMBINED N/A  4/21/2017    Procedure: COMBINED ESOPHAGOSCOPY, GASTROSCOPY, DUODENOSCOPY (EGD);  Esophagogastroduodenoscopy, Pharyngostomy Tube Placement ;  Surgeon: Jens Wise MD;  [facility]     ESOPHAGOSCOPY, GASTROSCOPY, DUODENOSCOPY (EGD), COMBINED N/A 5/19/2017    Procedure: COMBINED ESOPHAGOSCOPY, GASTROSCOPY, DUODENOSCOPY (EGD);  Upper Endoscopy, Irrigation and Debridement of Chest Wound ;  Surgeon: Nalini Barreto MD;  [facility]     ESOPHAGOSCOPY, GASTROSCOPY, DUODENOSCOPY (EGD), COMBINED N/A 5/23/2017    Procedure: COMBINED ESOPHAGOSCOPY, GASTROSCOPY, DUODENOSCOPY (EGD);;  Surgeon: Nalini Barreto MD;  [facility]     ESOPHAGOSCOPY, GASTROSCOPY, DUODENOSCOPY (EGD), DILATATION, COMBINED N/A 6/29/2016    Procedure: COMBINED ESOPHAGOSCOPY, GASTROSCOPY, DUODENOSCOPY (EGD), DILATATION;  Surgeon: Jens Wise MD;  [facility]     EXPLORE NECK N/A 5/19/2017    Procedure: EXPLORE NECK;;  Surgeon: Nalini Barreto MD;  [facility]     HC UGI ENDOSCOPY W TRANSENDOSCOPIC STENT PLACEMENT N/A 7/12/2016    Procedure: COMBINED ESOPHAGOSCOPY, GASTROSCOPY, DUODENOSCOPY (EGD), PLACE TRANSENDOSCOPIC ESOPHAGEAL STENT;  Surgeon: Jens Wise MD;  [facility]     HC UGI ENDOSCOPY W TRANSENDOSCOPIC STENT PLACEMENT N/A 7/22/2016    Procedure: COMBINED ESOPHAGOSCOPY, GASTROSCOPY, DUODENOSCOPY (EGD), PLACE TRANSENDOSCOPIC ESOPHAGEAL STENT;  Surgeon: Jens Wise MD;  [facility]     INCISION AND DRAINAGE CHEST WASHOUT, COMBINED N/A 5/27/2017    Procedure: COMBINED INCISION AND DRAINAGE CHEST WASHOUT;  Chest washout;  Surgeon: Nalini Barreto MD;  [facility]     INCISION AND DRAINAGE CHEST WASHOUT, COMBINED N/A 5/28/2017    Procedure: COMBINED INCISION AND DRAINAGE CHEST WASHOUT;  Chest washout;  Surgeon: Nalini Barreto MD;  [facility]     INCISION AND DRAINAGE CHEST WASHOUT, COMBINED N/A 6/9/2017    Procedure: COMBINED INCISION AND DRAINAGE CHEST  WASHOUT;  Chest washout and dressing change, Esophaogastroduodenoscopy;  Surgeon: Jens Wise MD;  [facility]     IRRIGATION AND DEBRIDEMENT CHEST WASHOUT, COMBINED N/A 6/29/2016    Procedure: COMBINED IRRIGATION AND DEBRIDEMENT CHEST WASHOUT;  Surgeon: Jens Wise MD;  [facility]     IRRIGATION AND DEBRIDEMENT CHEST WASHOUT, COMBINED N/A 5/23/2017    Procedure: COMBINED IRRIGATION AND DEBRIDEMENT CHEST WASHOUT;  COMBINED IRRIGATION AND DEBRIDEMENT CHEST WASHOUT,COMBINED ESOPHAGOSCOPY, GASTROSCOPY, DUODENOSCOPY (EGD) ;  Surgeon: Nalini Barreto MD;  [facility]     IRRIGATION AND DEBRIDEMENT CHEST WASHOUT, COMBINED N/A 5/24/2017    Procedure: COMBINED IRRIGATION AND DEBRIDEMENT CHEST WASHOUT;  Chest wound Washout and Dressing Change;  Surgeon: Nalini Barreto MD;  [facility]     IRRIGATION AND DEBRIDEMENT CHEST WASHOUT, COMBINED N/A 5/25/2017    Procedure: COMBINED IRRIGATION AND DEBRIDEMENT CHEST WASHOUT;  Irrigation and Debridement Chest Washout and dressing change;  Surgeon: Nalini Barreto MD;  [facility]     IRRIGATION AND DEBRIDEMENT CHEST WASHOUT, COMBINED N/A 5/26/2017    Procedure: COMBINED IRRIGATION AND DEBRIDEMENT CHEST WASHOUT;  Irrigation and Debridement Chest Washout with  dressing change;  Surgeon: Nalini Barreto MD;  [facility]     IRRIGATION AND DEBRIDEMENT CHEST WASHOUT, COMBINED N/A 5/30/2017    Procedure: COMBINED IRRIGATION AND DEBRIDEMENT CHEST WASHOUT;  Irrigation and Debridement Chest Washout , PICC line dressing change;  Surgeon: Nalini Barreto MD;  [facility]     IRRIGATION AND DEBRIDEMENT CHEST WASHOUT, COMBINED N/A 5/31/2017    Procedure: COMBINED IRRIGATION AND DEBRIDEMENT CHEST WASHOUT;  Irrigation and Debridement Chest Washout ;  Surgeon: Nalini Barreto MD;  [facility]     IRRIGATION AND DEBRIDEMENT CHEST WASHOUT, COMBINED N/A 6/1/2017    Procedure: COMBINED IRRIGATION AND DEBRIDEMENT CHEST WASHOUT;  Chest Wound  Irrigation And Debridement with dressing change ;  Surgeon: Nalini Barreto MD;  [facility]     IRRIGATION AND DEBRIDEMENT CHEST WASHOUT, COMBINED N/A 6/4/2017    Procedure: COMBINED IRRIGATION AND DEBRIDEMENT CHEST WASHOUT;  COMBINED IRRIGATION AND DEBRIDEMENT CHEST WASHOUT, Esophagoscopy, Gastroscopy, Duodenoscopy (EGD) place transendoscopic esophageal stent,   Pharyngostomy and chest wall tube exchange  C-Arm;  Surgeon: Jens Wise MD;  [facility]     IRRIGATION AND DEBRIDEMENT CHEST WASHOUT, COMBINED N/A 6/2/2017    Procedure: COMBINED IRRIGATION AND DEBRIDEMENT CHEST WASHOUT;  Chest Wound Irrigation and Debridement, Dressing Change;  Surgeon: Nalini Barreto MD;  [facility]     LAPAROSCOPIC ASSISTED INSERTION TUBE JEJUNOSTOMY N/A 4/17/2017    Procedure: LAPAROSCOPIC ASSISTED INSERTION TUBE JEJUNOSTOMY;;  Surgeon: Jens Wise MD;  [facility]     LAPAROSCOPY DIAGNOSTIC (GENERAL) N/A 1/9/2017    Procedure: LAPAROSCOPY DIAGNOSTIC (GENERAL);  Surgeon: Jens Wise MD;  [facility]     LAPAROSCOPY DIAGNOSTIC (GENERAL) N/A 4/17/2017    Procedure: LAPAROSCOPY DIAGNOSTIC (GENERAL);  Laparoscopic , Neck Dissection, Spit Fistula Takedown, Laparoscopic Jejunostomy Tube and Pharyngostomy Tube, Gastric Pull up, Upper Endoscopy(EGD)  , Flexible Bronchoscopy ,Sternotomy ;  Surgeon: Jens Wise MD;  [facility]     LUMPECTOMY BREAST Right 2007     NERVE BLOCK PERIPHERAL N/A 8/30/2016    Procedure: NERVE BLOCK PERIPHERAL;  Surgeon: GENERIC ANESTHESIA PROVIDER;  [facility]: [facility]     NISSEN FUNDOPLICATION  1/2016     PHARYNGOSTOMY N/A 4/18/2016    Procedure: PHARYNGOSTOMY;  Surgeon: Alexis Barraza MD;  [facility]     PHARYNGOSTOMY N/A 4/25/2016    Procedure: PHARYNGOSTOMY;  Surgeon: Alexis Barraza MD;  [facility]     PHARYNGOSTOMY N/A 5/4/2016    Procedure: PHARYNGOSTOMY;  Surgeon: Alexis Barraza MD;   "[facility]     PHARYNGOSTOMY N/A 6/22/2016    Procedure: PHARYNGOSTOMY;  Surgeon: Alexis Barraza MD;  [facility]     PHARYNGOSTOMY N/A 6/29/2016    Procedure: PHARYNGOSTOMY;  Surgeon: Jens Wise MD;  [facility]     PHARYNGOSTOMY N/A 4/21/2017    Procedure: PHARYNGOSTOMY;;  Surgeon: Jens Wise MD;  [facility]     PHARYNGOSTOMY Left 5/31/2017    Procedure: PHARYNGOSTOMY;;  Surgeon: Nalini Barreto MD;  [facility]     PICC INSERTION Left 8/25/2016    5fr DL BioFlo PICC, 42cm (3cm external) in the L medial brachial vein w/ tip in the SVC RA junction.     PICC INSERTION - Rewire Right 05/31/2017    5fr DL BioFlo PICC, 40cm (6cm external) in the R medial brachial vein w/ tip in the SVC RA junction.     THORACOTOMY Right 1998    lung infection - \"hard crust formed on lung\"     THORACOTOMY Left 1/9/2017    Procedure: THORACOTOMY;  Surgeon: Jens Wise MD;  [facility]     WRIST SURGERY              Previous Endoscopy:   No results found for this or any previous visit.         Social History:   Reviewed and edited as appropriate  Social History     Social History     Marital status:      Spouse name: neeru     Number of children: 2     Years of education: N/A     Occupational History     retired       Social History Main Topics     Smoking status: Former Smoker     Packs/day: 1.00     Years: 15.00     Types: Cigarettes     Quit date: 1/1/1998     Smokeless tobacco: Not on file     Alcohol use No     Drug use: No     Sexual activity: Not on file     Other Topics Concern     Not on file     Social History Narrative    Retired realtor.  Lives with  Neeru. 2 children alive and well.            Family History:   Reviewed and edited as appropriate  Family History   Problem Relation Age of Onset     Alzheimer Disease Mother      CEREBROVASCULAR DISEASE Father      cerebral hemorrhage     Ovarian Cancer Sister      " Breast Cancer Daughter            Allergies:   Reviewed and edited as appropriate     Allergies   Allergen Reactions     Amoxicillin Diarrhea     Ativan [Lorazepam] Other (See Comments)     Hallucinations     Hydromorphone Itching     4/12/17 - patient open to using this as she tolerated Hydromorphone PCA during hospitalization in January 2017.      Morphine Itching            Medications:     Current Facility-Administered Medications   Medication     diatrizoate meglumine-sodium (GASTROGRAFIN; GASTROVIEW) 66-10 % solution 240 mL     parenteral nutrition - ADULT compounded formula     Benzocaine (HURRICAINE/TOPEX) 20 % spray     lipids (INTRALIPID) 20 % infusion 200 mL     fentaNYL (DURAGESIC) Patch in Place     [START ON 6/15/2017] fentaNYL (DURAGESIC) patch REMOVAL     fentaNYL (DURAGESIC) 12 mcg/hr 72 hr patch 1 patch     fentaNYL (DURAGESIC) 75 mcg/hr 72 hr patch 1 patch     parenteral nutrition - ADULT compounded formula     saccharomyces boulardii (FLORASTOR) capsule 250 mg     oxyCODONE (ROXICODONE) solution 10-15 mg     HYDROmorphone (PF) (DILAUDID) injection 0.3-0.5 mg     enoxaparin (LOVENOX) injection 40 mg     simethicone (MYLICON) suspension 40 mg     bacitracin ointment     chlorhexidine (PERIDEX) 0.12 % solution 15 mL     chlorhexidine (HIBICLENS) 4 % liquid     lidocaine (LIDODERM) 5 % Patch 1 patch    And     lidocaine (LIDODERM) patch REMOVAL    And     lidocaine (LIDODERM) patch in PLACE     lidocaine 1 % 0.5-5 mL     lidocaine (LMX4) kit     sodium chloride (PF) 0.9% PF flush 5-50 mL     lidocaine 1 % 1 mL     lidocaine (LMX4) kit     sodium chloride (PF) 0.9% PF flush 10-20 mL     heparin lock flush 10 UNIT/ML injection 5-10 mL     heparin lock flush 10 UNIT/ML injection 5-10 mL     benzocaine-menthol (CHLORASEPTIC) 6-10 MG lozenge 1 lozenge     heparin lock flush 10 UNIT/ML injection 2-5 mL     metoprolol (LOPRESSOR) suspension 25 mg     sodium chloride (PF) 0.9% PF flush 10-20 mL     sodium  chloride (PF) 0.9% PF flush 10 mL     potassium chloride SA (K-DUR/KLOR-CON M) CR tablet 20-40 mEq     potassium chloride (KLOR-CON) Packet 20-40 mEq     potassium chloride 10 mEq in 100 mL intermittent infusion     potassium chloride 10 mEq in 100 mL intermittent infusion with 10 mg lidocaine     potassium chloride 20 mEq in 50 mL intermittent infusion     magnesium sulfate 2 g in NS intermittent infusion ([employer]ar[employer] or FV Cmpd)     magnesium sulfate 4 g in 100 mL sterile water (premade)     potassium phosphate 15 mmol in D5W 250 mL intermittent infusion     potassium phosphate 20 mmol in D5W 500 mL intermittent infusion     potassium phosphate 20 mmol in D5W 250 mL intermittent infusion     potassium phosphate 25 mmol in D5W 500 mL intermittent infusion     sodium chloride (PF) 0.9% PF flush 3 mL     sodium chloride (PF) 0.9% PF flush 3 mL     traZODone (DESYREL) tablet 100 mg     diphenhydrAMINE (BENADRYL) capsule 25 mg     fiber modular (NUTRISOURCE FIBER) packet 1 packet     diphenoxylate-atropine (LOMOTIL) liquid 10 mL     acetaminophen (TYLENOL) solution 975 mg     cholestyramine (QUESTRAN) Packet 4 g     gabapentin (NEURONTIN) solution 300 mg     loperamide (IMODIUM) liquid 4 mg     opium tincture 10 MG/ML (1%) liquid 6 mg     pantoprazole (PROTONIX) suspension 40 mg     prochlorperazine (COMPAZINE) tablet 5 mg    Or     prochlorperazine (COMPAZINE) injection 5 mg    Or     prochlorperazine (COMPAZINE) Suppository 12.5 mg     dextrose 10 % 1,000 mL infusion     glucose 40 % gel 15-30 g    Or     dextrose 50 % injection 25-50 mL    Or     glucagon injection 1 mg     artificial saliva (BIOTENE DRY MOUTHWASH) liquid 20 mL     ipratropium - albuterol 0.5 mg/2.5 mg/3 mL (DUONEB) neb solution 3 mL     labetalol (NORMODYNE/TRANDATE) injection 10-40 mg     albuterol (PROAIR HFA/PROVENTIL HFA/VENTOLIN HFA) Inhaler 4 puff     albuterol neb solution 2.5 mg     sodium chloride (PF) 0.9% PF flush 3 mL     sodium chloride  (PF) 0.9% PF flush 3 mL     naloxone (NARCAN) injection 0.1-0.4 mg     ondansetron (ZOFRAN-ODT) ODT tab 4 mg    Or     ondansetron (ZOFRAN) injection 4 mg     ferrous sulfate 300 (60 FE) MG/5ML syrup 300 mg     Facility-Administered Medications Ordered in Other Encounters   Medication     bupivacaine 0.25 % - EPINEPHrine 1:200,000 injection             Review of Systems:   A complete review of systems was performed and is negative except as noted in the HPI           Physical Exam:   /60 (BP Location: Left arm)  Pulse 102  Temp 97.1  F (36.2  C) (Oral)  Resp 16  Ht 1.524 m (5')  Wt 38.8 kg (85 lb 8 oz)  SpO2 100%  Breastfeeding? No  BMI 16.7 kg/m2  Wt:   Wt Readings from Last 2 Encounters:   06/12/17 38.8 kg (85 lb 8 oz)   05/18/17 42.9 kg (94 lb 8 oz)   Constitutional: cooperative, no distress, emaciated.  Eyes: Sclera anicteric/ no pallor  Ears/nose/mouth/throat:  Pharyngostomy tube present. Normal oropharynx without ulcers or exudate, mucus membranes moist,  CV: Normal S1, S2, no murmurs.  Respiratory: clear to auscultation b/l, no murmur, mediastinal chest tube present.  Abd: Nondistended, +bs, nontender, no peritoneal signs, J tube present with no surrounding skin changes.   Skin: warm, perfused, no jaundice  Neuro: AAO x 3, no focal motor sensory deficits       Data:   Labs and imaging below were independently reviewed and interpreted    BMP  Recent Labs  Lab 06/12/17  0704 06/09/17  1707 06/07/17  0712    135 136   POTASSIUM 4.0 4.2 4.1   CHLORIDE 104 102 103   ANNABELLE 9.2 8.8 8.6   CO2 23 22 26   BUN 33* 18 18   CR 0.35* 0.34* 0.39*   GLC 96 132* 94     CBC  Recent Labs  Lab 06/12/17  0704 06/09/17  1707 06/07/17  0712   WBC 13.2* 11.6* 10.0   RBC 3.08* 3.21* 2.73*   HGB 8.9* 9.3* 7.9*   HCT 29.8* 31.1* 25.8*   MCV 97 97 95   MCH 28.9 29.0 28.9   MCHC 29.9* 29.9* 30.6*   RDW 19.0* 19.6* 20.3*   * 583* 391     INR  Recent Labs  Lab 06/12/17  0704   INR 1.06     LFTs  Recent Labs  Lab  06/12/17  0704   ALKPHOS 434*   AST 30   ALT 39   BILITOTAL 0.3   PROTTOTAL 8.2   ALBUMIN 2.8*      PANCNo lab results found in last 7 days.

## 2017-06-13 NOTE — PLAN OF CARE
Problem: Goal Outcome Summary  Goal: Goal Outcome Summary  Outcome: No Change  Vitals:     06/12/17 2130 06/12/17 2147 06/13/17 0900 06/13/17 1628   BP:   97/49 124/60 127/65   BP Location:   Left arm Left arm Left arm   Cuff Size:           Pulse:           Resp:   16 16 16   Temp:   96.8  F (36  C) 97.1  F (36.2  C) 96.2  F (35.7  C)   TempSrc:   Oral Oral Oral   SpO2:   100% 100% 97%   Weight: 38.8 kg (85 lb 8 oz)         Height:             A&Ox4. Afeb, AVSS. O2 sats % on RA. Denies SOB. LS clear & dim. C/o pain in chest, given Oxy x3. Up ad renea to commode. Ambulated in hallways x1 w/ SBA w/ OT. Voiding and having watery loose BMs. + flatus, + BS. NPO w/ ice chips. Pharyngostomy tube irrigated per shift and site cleaned per orders. 150 cc green output from pharyngostomy tube. Chest drain to gravity w/ no output. J tube site cleaned and changed w/ TF started @ 1000, pt turning TF from 10-25 cc/h. All meds down J tube. Chest wound covered, MD changed dressing this AM, CDI. PICC Dbl lumen w/ continuous TPN @ 60 mL/h. Fent patch in R back. Hurricane spray administered for sore throat. Able to make needs known. Family at bedside. Continue with POC.

## 2017-06-13 NOTE — PROGRESS NOTES
Thoracic surgery progress note    No acute events overnight. Slept well. TFs stopped overnight for diarrhea.  Pain controlled.    Temp: 96.8  F (36  C) Temp  Min: 96.3  F (35.7  C)  Max: 96.8  F (36  C)  Resp: 16 Resp  Min: 16  Max: 16  SpO2: 100 % SpO2  Min: 99 %  Max: 100 %    No Data Recorded  Heart Rate: 77 Heart Rate  Min: 77  Max: 90  BP: 97/49 Systolic (24hrs), Av , Min:97 , Max:136   Diastolic (24hrs), Av, Min:49, Max:65    A&O, NAD  Unlabored breathing on RA  Pharyngostomy tube site with stable swelling, improved from several days ago  Chest wounds granulating well - some greenish staining on the packing (likely normal colonized wound, less likely bilious staining)   Abd soft, non tender    I&O  UOP: 750  Pharyngostomy 375  Chest tube 25    A/P: 72F w/ chronic esophageal perforation s/p Nissen at OSH, now s/p esophagectomy with gastric pull-up c/b recurrent anastomotic leak and mediastinal abscess s/p serial washouts, pharyngostomy tube and most recently EGD w/ stent placement on .      - Fentanyl patch increased yesterday PM  - Pharyngostomy retention sutures in chest removed, ensure flexi-track in place at all times  - Wean off IV pain meds as able - appreciate palliative recs  - Strict NPO. C/w TPN and tube feeds @ goal 6 hours overnight, minimize tube feeding interruptions   - Chronic diarrhea - max doses of anti-motility agents. GI studies pending, holding on SBFT for now per staff  - Daily dressing changes  - Deja Rogers PA-C  P: [phone]

## 2017-06-13 NOTE — PROGRESS NOTES
Palliative Care Inpatient Clinical Social Work Assessment    Patient Information:  Minerva is a 72 year old woman with chronic esophageal perforation and recurrent anastomotic leak and mediastinal abscess.  Palliative Consult Team was consulted for pain management issues and coping.  I met with Minerva in her room this afternoon.  She was verbal and engaged, receptive to intervention.    Relevant Symptoms/Concerns     Physical:  Many complications since surgery at an outside hospital for a hiatal hernia about a year and a half ago.   Psychological/Emotional/Existential:  Stress of many hospitalizations, limitations, changes in her life due to medical issues.   Family/Social/Caregiver:   Concerns re stress on .   Developmental:  Stopped working as a  a number of years ago due to treatment for breast cancer.  Would not have left that work otherwise.   Mental Health:      End of Life:      Cultural/Rastafarian/Spiritual:      Grief/Loss:  Many health related losses.   Concurrent Stressors:        Comments:       Strengths     Physical:     Psychological/Emotional/Existential:  Able to verbalize thoughts and feelings.   Family/Social/Caregiver:  Supportive family includes  Enrique and two daughters.  One daughter lives in [facility] and has two daughters.  Other daughter lives locally and has two sons.   Developmental:      Mental Health:  No history of diagnosis or treatment of mental health concerns.   End of Life:      Cultural/Rastafarian/Spiritual:  Strong hitesh, sees it as an important support.   Grief/Loss:  Grieving multiple health related losses appropriately.      Comments:       Goals/Decision Making/Advance Care Planning   Preferences:  Restorative goals.   Concerns:      Documents:  Copy of health care directive scanned into electronic medical record.  Names  Richard Blanco as primary health care agent and daughter Chandan Sosa as secondary health care  agent.   Decision Making Issues:        Comments:  Minerva is fully decisional at this time.    Resource Needs     Discharge Planning:  Per Unit/Program  and/or Care Coordinator   Other:      Comments:  Minerva says that a TCU is being recommended, but she believes she could go home with home care.  We discussed the possibility of her requesting a care conference.    Sources of Information   Patient:  X   Family:      Staff:  X   Chart Review:      Other:       Intervention (Check all that apply)    X   Assessment of palliative specific issues      X   Introduction of Palliative clinical social work interventions    X   Adjustment to illness counseling        Advanced care planning        Attended/participated in care conference        Behavioral interventions for symptom management    X   Facilitation of processing of thoughts/feelings        Family communication facilitated    X   Grief counseling        Goals of care discussion/facilitation        Life legacy work    X    Life review facilitation    X   Psychoeducation        Re-framing        Resource referral        Other:       Comments:  Minerva would like me to meet her  Enrique as well.  He was not there at the time of my visit, and I was unable to return later in the afternoon.    Plan:  Will follow for coping with illness counseling and other interventions as assessed/needed.  Will see Minerva one to two times per week.

## 2017-06-14 LAB
A1AT STL-MCNT: 0.1 MG/G
ANION GAP SERPL CALCULATED.3IONS-SCNC: 8 MMOL/L (ref 3–14)
BUN SERPL-MCNC: 31 MG/DL (ref 7–30)
CALCIUM SERPL-MCNC: 8.8 MG/DL (ref 8.5–10.1)
CALPROTECTIN STL-MCNT: 38 UG/G
CHLORIDE SERPL-SCNC: 104 MMOL/L (ref 94–109)
CO2 SERPL-SCNC: 24 MMOL/L (ref 20–32)
CREAT SERPL-MCNC: 0.3 MG/DL (ref 0.52–1.04)
ERYTHROCYTE [DISTWIDTH] IN BLOOD BY AUTOMATED COUNT: 18.2 % (ref 10–15)
GFR SERPL CREATININE-BSD FRML MDRD: ABNORMAL ML/MIN/1.7M2
GLUCOSE SERPL-MCNC: 92 MG/DL (ref 70–99)
HCT VFR BLD AUTO: 29.2 % (ref 35–47)
HGB BLD-MCNC: 8.9 G/DL (ref 11.7–15.7)
MAGNESIUM SERPL-MCNC: 1.8 MG/DL (ref 1.6–2.3)
MCH RBC QN AUTO: 29.5 PG (ref 26.5–33)
MCHC RBC AUTO-ENTMCNC: 30.5 G/DL (ref 31.5–36.5)
MCV RBC AUTO: 97 FL (ref 78–100)
PHOSPHATE SERPL-MCNC: 2.9 MG/DL (ref 2.5–4.5)
PLATELET # BLD AUTO: 563 10E9/L (ref 150–450)
POTASSIUM SERPL-SCNC: 3.8 MMOL/L (ref 3.4–5.3)
RBC # BLD AUTO: 3.02 10E12/L (ref 3.8–5.2)
SODIUM SERPL-SCNC: 135 MMOL/L (ref 133–144)
WBC # BLD AUTO: 10.9 10E9/L (ref 4–11)

## 2017-06-14 PROCEDURE — 85027 COMPLETE CBC AUTOMATED: CPT | Performed by: STUDENT IN AN ORGANIZED HEALTH CARE EDUCATION/TRAINING PROGRAM

## 2017-06-14 PROCEDURE — 25000132 ZZH RX MED GY IP 250 OP 250 PS 637: Mod: GY | Performed by: PHYSICIAN ASSISTANT

## 2017-06-14 PROCEDURE — 25000125 ZZHC RX 250: Performed by: SURGERY

## 2017-06-14 PROCEDURE — 25000125 ZZHC RX 250: Performed by: STUDENT IN AN ORGANIZED HEALTH CARE EDUCATION/TRAINING PROGRAM

## 2017-06-14 PROCEDURE — 99233 SBSQ HOSP IP/OBS HIGH 50: CPT | Mod: GC | Performed by: INTERNAL MEDICINE

## 2017-06-14 PROCEDURE — 25000128 H RX IP 250 OP 636: Performed by: STUDENT IN AN ORGANIZED HEALTH CARE EDUCATION/TRAINING PROGRAM

## 2017-06-14 PROCEDURE — 12000003 ZZH R&B CRITICAL UMMC

## 2017-06-14 PROCEDURE — 36592 COLLECT BLOOD FROM PICC: CPT | Performed by: STUDENT IN AN ORGANIZED HEALTH CARE EDUCATION/TRAINING PROGRAM

## 2017-06-14 PROCEDURE — A9270 NON-COVERED ITEM OR SERVICE: HCPCS | Mod: GY | Performed by: SURGERY

## 2017-06-14 PROCEDURE — 40000802 ZZH SITE CHECK

## 2017-06-14 PROCEDURE — 25000125 ZZHC RX 250: Performed by: PHYSICIAN ASSISTANT

## 2017-06-14 PROCEDURE — 25000132 ZZH RX MED GY IP 250 OP 250 PS 637: Mod: GY | Performed by: INTERNAL MEDICINE

## 2017-06-14 PROCEDURE — A9270 NON-COVERED ITEM OR SERVICE: HCPCS | Mod: GY | Performed by: PHYSICIAN ASSISTANT

## 2017-06-14 PROCEDURE — 80048 BASIC METABOLIC PNL TOTAL CA: CPT | Performed by: STUDENT IN AN ORGANIZED HEALTH CARE EDUCATION/TRAINING PROGRAM

## 2017-06-14 PROCEDURE — A9270 NON-COVERED ITEM OR SERVICE: HCPCS | Mod: GY | Performed by: STUDENT IN AN ORGANIZED HEALTH CARE EDUCATION/TRAINING PROGRAM

## 2017-06-14 PROCEDURE — 25000132 ZZH RX MED GY IP 250 OP 250 PS 637: Mod: GY | Performed by: STUDENT IN AN ORGANIZED HEALTH CARE EDUCATION/TRAINING PROGRAM

## 2017-06-14 PROCEDURE — A9270 NON-COVERED ITEM OR SERVICE: HCPCS | Mod: GY | Performed by: INTERNAL MEDICINE

## 2017-06-14 PROCEDURE — 25000132 ZZH RX MED GY IP 250 OP 250 PS 637: Mod: GY | Performed by: SURGERY

## 2017-06-14 PROCEDURE — 84100 ASSAY OF PHOSPHORUS: CPT | Performed by: STUDENT IN AN ORGANIZED HEALTH CARE EDUCATION/TRAINING PROGRAM

## 2017-06-14 PROCEDURE — 99207 ZZC CDG-CORRECTLY CODED, REVIEWED AND AGREE: CPT | Performed by: INTERNAL MEDICINE

## 2017-06-14 PROCEDURE — 83735 ASSAY OF MAGNESIUM: CPT | Performed by: STUDENT IN AN ORGANIZED HEALTH CARE EDUCATION/TRAINING PROGRAM

## 2017-06-14 RX ORDER — FENTANYL 25 UG/1
25 PATCH TRANSDERMAL
Status: DISCONTINUED | OUTPATIENT
Start: 2017-06-14 | End: 2017-06-21

## 2017-06-14 RX ORDER — GABAPENTIN 250 MG/5ML
300 SOLUTION ORAL EVERY 8 HOURS SCHEDULED
Status: DISCONTINUED | OUTPATIENT
Start: 2017-06-14 | End: 2017-06-16

## 2017-06-14 RX ORDER — GABAPENTIN 250 MG/5ML
300 SOLUTION ORAL AT BEDTIME
Status: DISCONTINUED | OUTPATIENT
Start: 2017-06-14 | End: 2017-06-16

## 2017-06-14 RX ORDER — LIDOCAINE 50 MG/G
OINTMENT TOPICAL 2 TIMES DAILY PRN
Status: DISCONTINUED | OUTPATIENT
Start: 2017-06-14 | End: 2017-06-29 | Stop reason: HOSPADM

## 2017-06-14 RX ORDER — GABAPENTIN 250 MG/5ML
600 SOLUTION ORAL 3 TIMES DAILY
Status: DISCONTINUED | OUTPATIENT
Start: 2017-06-14 | End: 2017-06-14

## 2017-06-14 RX ORDER — FENTANYL 100 UG/1
100 PATCH TRANSDERMAL
Status: DISCONTINUED | OUTPATIENT
Start: 2017-06-14 | End: 2017-06-29 | Stop reason: HOSPADM

## 2017-06-14 RX ADMIN — OXYCODONE HYDROCHLORIDE 20 MG: 5 SOLUTION ORAL at 19:12

## 2017-06-14 RX ADMIN — Medication 250 MG: at 08:35

## 2017-06-14 RX ADMIN — Medication 250 MG: at 19:14

## 2017-06-14 RX ADMIN — OXYCODONE HYDROCHLORIDE 15 MG: 5 SOLUTION ORAL at 00:39

## 2017-06-14 RX ADMIN — GABAPENTIN 300 MG: 250 SOLUTION ORAL at 08:31

## 2017-06-14 RX ADMIN — SIMETHICONE 40 MG: 20 SUSPENSION/ DROPS ORAL at 06:34

## 2017-06-14 RX ADMIN — Medication 6 MG: at 03:36

## 2017-06-14 RX ADMIN — OXYCODONE HYDROCHLORIDE 20 MG: 5 SOLUTION ORAL at 10:20

## 2017-06-14 RX ADMIN — LOPERAMIDE HYDROCHLORIDE 4 MG: 1 SOLUTION ORAL at 08:31

## 2017-06-14 RX ADMIN — SIMETHICONE 40 MG: 20 SUSPENSION/ DROPS ORAL at 15:53

## 2017-06-14 RX ADMIN — MINERAL SUPPLEMENT IRON 300 MG / 5 ML STRENGTH LIQUID 100 PER BOX UNFLAVORED 300 MG: at 08:34

## 2017-06-14 RX ADMIN — GABAPENTIN 300 MG: 250 SOLUTION ORAL at 16:00

## 2017-06-14 RX ADMIN — OXYCODONE HYDROCHLORIDE 15 MG: 5 SOLUTION ORAL at 03:36

## 2017-06-14 RX ADMIN — SODIUM CHLORIDE, PRESERVATIVE FREE 5 ML: 5 INJECTION INTRAVENOUS at 06:48

## 2017-06-14 RX ADMIN — LOPERAMIDE HYDROCHLORIDE 4 MG: 1 SOLUTION ORAL at 15:52

## 2017-06-14 RX ADMIN — FENTANYL 1 PATCH: 100 PATCH, EXTENDED RELEASE TRANSDERMAL at 15:43

## 2017-06-14 RX ADMIN — SIMETHICONE 40 MG: 20 SUSPENSION/ DROPS ORAL at 19:14

## 2017-06-14 RX ADMIN — OXYCODONE HYDROCHLORIDE 15 MG: 5 SOLUTION ORAL at 16:00

## 2017-06-14 RX ADMIN — CHLORHEXIDINE GLUCONATE 15 ML: 1.2 RINSE ORAL at 12:56

## 2017-06-14 RX ADMIN — Medication 6 MG: at 15:51

## 2017-06-14 RX ADMIN — FENTANYL 1 PATCH: 25 PATCH, EXTENDED RELEASE TRANSDERMAL at 15:43

## 2017-06-14 RX ADMIN — OXYCODONE HYDROCHLORIDE 15 MG: 5 SOLUTION ORAL at 06:35

## 2017-06-14 RX ADMIN — METOPROLOL TARTRATE 25 MG: 100 TABLET ORAL at 19:13

## 2017-06-14 RX ADMIN — Medication 10 ML: at 12:57

## 2017-06-14 RX ADMIN — BACITRACIN ZINC: 500 OINTMENT TOPICAL at 19:29

## 2017-06-14 RX ADMIN — Medication 2 G: at 11:01

## 2017-06-14 RX ADMIN — BACITRACIN ZINC: 500 OINTMENT TOPICAL at 09:04

## 2017-06-14 RX ADMIN — LOPERAMIDE HYDROCHLORIDE 4 MG: 1 SOLUTION ORAL at 12:57

## 2017-06-14 RX ADMIN — ACETAMINOPHEN 975 MG: 160 SUSPENSION ORAL at 08:34

## 2017-06-14 RX ADMIN — Medication 10 ML: at 15:50

## 2017-06-14 RX ADMIN — Medication 6 MG: at 21:55

## 2017-06-14 RX ADMIN — OXYCODONE HYDROCHLORIDE 20 MG: 5 SOLUTION ORAL at 21:55

## 2017-06-14 RX ADMIN — PANTOPRAZOLE SODIUM 40 MG: 40 TABLET, DELAYED RELEASE ORAL at 08:31

## 2017-06-14 RX ADMIN — ENOXAPARIN SODIUM 40 MG: 40 INJECTION SUBCUTANEOUS at 13:17

## 2017-06-14 RX ADMIN — CHLORHEXIDINE GLUCONATE 15 ML: 1.2 RINSE ORAL at 08:36

## 2017-06-14 RX ADMIN — GABAPENTIN 300 MG: 250 SOLUTION ORAL at 21:55

## 2017-06-14 RX ADMIN — SIMETHICONE 40 MG: 20 SUSPENSION/ DROPS ORAL at 11:00

## 2017-06-14 RX ADMIN — ACETAMINOPHEN 975 MG: 160 SUSPENSION ORAL at 15:48

## 2017-06-14 RX ADMIN — Medication 6 MG: at 10:59

## 2017-06-14 RX ADMIN — OXYCODONE HYDROCHLORIDE 15 MG: 5 SOLUTION ORAL at 12:57

## 2017-06-14 RX ADMIN — TRAZODONE HYDROCHLORIDE 100 MG: 100 TABLET ORAL at 21:55

## 2017-06-14 RX ADMIN — POTASSIUM CHLORIDE: 2 INJECTION, SOLUTION, CONCENTRATE INTRAVENOUS at 20:16

## 2017-06-14 RX ADMIN — GABAPENTIN 300 MG: 250 SOLUTION ORAL at 12:57

## 2017-06-14 RX ADMIN — METOPROLOL TARTRATE 25 MG: 100 TABLET ORAL at 08:33

## 2017-06-14 RX ADMIN — Medication 10 ML: at 19:13

## 2017-06-14 RX ADMIN — LOPERAMIDE HYDROCHLORIDE 4 MG: 1 SOLUTION ORAL at 19:21

## 2017-06-14 ASSESSMENT — PAIN DESCRIPTION - DESCRIPTORS: DESCRIPTORS: DISCOMFORT

## 2017-06-14 NOTE — OP NOTE
DATE OF PROCEDURE:  2017.      PREOPERATIVE DIAGNOSIS:  Open chest wound, status post anastomotic leak after esophagectomy.      POSTOPERATIVE DIAGNOSIS:  Open chest wound, status post anastomotic leak after esophagectomy.      PROCEDURE PERFORMED:  Chest wound washout and dressing change.      SURGEON:  Conchis Marrero MD.      ASSISTANT SURGEON:  Storm Whitlock MD.        DESCRIPTION OF PROCEDURE:  After obtaining informed consent, Favian Malone was brought to the operating room and laid supine on the operating table with the head up to avoid aspiration.  The packing from the previous day was removed and the cavity was washed with warm saline.  After thoroughly washing it out, it was repacked again with Kerlix gauze.  The procedure was done with deep sedation and monitored anesthesia care.  The patient tolerated the procedure well.         CONCHIS MARRERO MD             D: 2017 09:55   T: 2017 17:07   MT: TD      Name:     FAVIAN MALONE   MRN:      [id number]        Account:        [id number]   :      1945           Procedure Date: 2017      Document: [id number]

## 2017-06-14 NOTE — PROGRESS NOTES
CLINICAL NUTRITION SERVICES - REASSESSMENT NOTE     Nutrition Prescription    RECOMMENDATIONS FOR MDs/PROVIDERS TO ORDER:  Of note, pt's goal TF regimen (w/o TPN) to meet 100% estimated kcal and PRO needs would be either:  -- TwoCal HN @ 35 mL/hr continuously   OR   -- TwoCal HN @ 50 mL/hr x 16 hours     Malnutrition Status:    Severe malnutrition in the context of acute on chronic illness     Recommendations already ordered by Registered Dietitian (RD):  --Modified TPN as follows given elevated Alk Phos / BUN and further consideration of PN + EN provisions (per dosing wt 39 kg):  1440 mL (@60 mL/hr) with 165 gm dextrose (GIR = 2.9 mg/kg/min), 65 gm AA to provide 821 kcal (21 kcal/kg) and 1.5 gm/kg/pro.     Future/Additional Recommendations:  -- Check weekly TG and assess ability to resume lipids for additional fat kcals w/ TPN pending potential TF tolerance improvements  -- If patient to start tolerating TF consistently @ 35 mL/hr, may need to adjust TPN provisions (jace PRO to avoid going over 2.5 g/kg PRO daily).       EVALUATION OF THE PROGRESS TOWARD GOALS   Nutrition Support (TF): Via J-tube with Two Mehrdad HN @ 35 mL/hr x 16 hours (per provider orders given pt turns off TF at night 2/2 diarrhea) (560 mL/day) during the day which provides 1120 kcal (29 kcal/kg), 47 g PRO (1.3 g/kg), 123 g CHO, 51 g fat, 3 g fiber, and 392 mL free water daily per dosing weight 39 kg    TF Intake: Difficult to determine nutrient intake via TF as pt frequently changes rate (within range of 10-25 mL/hr) sometimes shutting off TF completely, and continues to have persistent watery diarrhea (at least 10-15x/day).  Pt has declined fiber modulars majority of past week without changes in volume/frequency of diarreha. Pt reports frequency of diarrhea is directly related to the rate of infusion (therefore, she states she is unable to adv >25 mL/hr).    Estimate avg infusion ranging from 10-25 mL/hr x 16 hrs = 160-400 mL/day providing 320-800  "kcal (8-21 kcal/kg) and 13-34 gm pro (0.3-0.9 gm pro/kg). (per new dosing wt 39 kg). **Note pt likely receiving low-end of this estimation given pt does not always run over 24hrs/day.     Nutrition Support (PN): Via PICC with 1440 mL (@ 60 mL/hr) of 80 gm AA, 170 gm dextrose + 200 mL 20% lipids 7x/wk (lipids d/c'd on  due to elevated T on ).    -- Current PN w/o lipids providing 898 kcal (23 kcal/kg), 2.1 g/kg PRO, GIR 3 mg/kg/min, and 0% kcal from fat.    -- Current TPN w/ lipids as previously ordered provided 1298 kcal (33 kcal/kg), 2.1 g/kg PRO, GIR 3 mg/kg/min, and 31% kcal from fat per new dosing weight 39 kg    PN Intake: Per chart review, no interruptions over the past week. On avg pt received 1189 kcal (30 kcal/kg) and 2.0 gm pro/kg (per new dosing wt 39 kg).     NEW FINDINGS   Weight: New lowest weight of 38.8 kg on  < 43 kg admit weight.     New Dosing Weight: 39 kg (actual wt from )      ASSESSED NUTRITION NEEDS  Estimated Energy Needs: 5734-8818 kcals/day (35 - 40+ kcals/kg for EN or EN+PN); 3674-7538 (30-35 kcal/kg for PN)  Justification: Post-op and Repletion; aim lower end if requires PN to prevent overfeeding  Estimated Protein Needs: 59 - 78 grams protein/day (1.5 - 2+ grams of pro/kg)  Justification: Post-op and Repletion (would not recommend going above 2.5 g/kg PRO in total from TF + PN)    Labs: Cr 0.35 -> 0.3 (L) likely r/t low muscle mass, BUN 33 -> 31 (H), Alk Phos 324 -> 434 (H)  Meds: Continues on imodium QID, mylicon q4hrs  GI: Fecal fat normal, \"Single portable AP view of abdomen was obtained.. No dilated loops of bowel or portal venous gas... Stable position of percutaneous jejunostomy tube tip projects over the right sacrum\"      MALNUTRITION  % Intake: </=50% for >/= 1 month (severe)  % Weight Loss: > 5% in 1 month (severe) - Down 9% over past ~1 month   Subcutaneous Fat Loss: Facial region, Upper arm, Lower arm and Thoracic/intercostal:  All severe  Muscle Loss: " Temporal, Facial & jaw region, Thoracic region (clavicle, acromium bone, deltoid, trapezius, pectoral) and Upper arm (bicep, tricep):  All severe  Fluid Accumulation/Edema: None noted  Malnutrition Diagnosis: Severe malnutrition in the context of acute on chronic illness     Previous Goals   Total avg nutritional intake to meet a minimum of 30 kcal/kg (soley from PN) or 35 kcal/kg (PN + EN) and 1.5 g PRO/kg daily (per dosing wt 43 kg).  Evaluation: Met    Previous Nutrition Diagnosis  Inadequate protein-energy intake related to TF intolerances/interruptions and leaking line access issues limiting PN infusion as evidenced by continual diarrhea/abdominal bloating, avg TF rate infusion of 25 mL/hr continuously, TPN on hold x2 days.    Evaluation: No change - however, slightly improved due to TPN tolerance     CURRENT NUTRITION DIAGNOSIS  Inadequate enteral nutrition infusion related to diarrhea inhibiting tolerance of goal enteral infusion as evidenced by intermittent rate changes (10-25 mL/hr) or shutting off TF but goal of 35 mL/hr continuous      INTERVENTIONS  Implementation  Collaboration with other providers - Discussed nutrition plan of care with providers and recommendation to continue current regimen. Discussed PN modification recs with PharmD  Parenteral Nutrition - Ordered per PharmD with above recs   Enteral Nutrition - Continue as ordered in [facility]  Nutrition education for recommended modifications - Encouraged pt to infuse >25 mL/hr during the day as this is when she has least diarrhea and may potentially better tolerate TF adv. Answered pt's questions regarding fiber in relation to diarrhea.    Goals  Total avg nutritional intake to meet a minimum of 30 kcal/kg (solely from PN) or 35 kcal/kg (PN+EN) and 1.5 g PRO/kg daily (per dosing wt 39 kg).    Monitoring/Evaluation  Progress toward goals will be monitored and evaluated per protocol.    Rina Colunga  Registration Eligible   Pager: [phone]    I have read  and agree with the above nutrition note, recs, and interventions   Evie Montanez RD, LD   [address] RD Pager: [phone]

## 2017-06-14 NOTE — PROGRESS NOTES
[facility], [place]   Palliative Care Daily Progress Note          Recommendations, Patient/Family Counseling & Coordination     Pain: Ashley continues to take additional oxycodone for pain control with her fentanyl patch recently increased to 87 mcg/hr. She took 135 mg oxycodone from 10 am yesterday to 10 am today (24 hours) which is about 200 mg of oral morphine equivalent or about another  of fentanyl.  She is also on Neurontin 300 mg tid originally used for shingles but continues to use it for some pain control. She admits it has made her tired in the past.    .   - Increase the fentanyl patch to 125 mcg/hr.   - continue oxycodone 15-20mg PO q3hrs PRN  - Increase the evening gabapentin dose to 600mg and keep the am and afternoon dose at 300 mg. If tolerating, would increase evening dose on 6/17 to 900mg and then will re-evaluate. Do not want to increase doses during the day as gabapentin causes ashley to feel increased fatigue.   - If she continues to not have any relief from the fentanyl, would consider rotating to methadone. Will follow closely and re-evaluate.     Coping: She continues to struggle with her prolonged hospitalization and slow progress. Our palliative  will continue to offer support and coping strategies.      POLST none     Thank you for the opportunity to continue to participate in the care of this patient and family.  Please feel free to contact on-call palliative provider with any emergent needs.  We can be reached via team pager [phone] (answered 8-4:30 Monday-Friday); after-hours answering service ([phone]); Main Palliative Clinic - [facility]: [phone].       The patient was discussed with Dr. Bernardo Ramirez MD  Palliative medicine fellow  Beeper [phone]    Attestation:   Patient seen and evaluated with Dr. Ramirez and I agree with/confirm his findings/recs in this note. Total time on the floor involved in  the patient's care: 40 minutes. Greater than 50% of my time was spent counseling and/or coordination of care regarding symptoms and goals.   Thank you for involving us in the patient's care.   Nisha Chang MD / Palliative Medicine / Pager [phone];  [facility] Inpatient Team Consult Pager [phone] (answered 8am-430pm M-F) - ok to text page via Infor / After-Hours Answering Service [phone] / Palliative Clinic in the [facility] at the [facility] - [phone] (scheduling); [phone] (triage).        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 She gets serial washouts and has an esophageal stent and pharyngostomy tube for drainage. She needs help with coping and more consistent pain control.     2)  Psychosocial/Spiritual Needs:   Ongoing          Is new assessment/intervention required by palliative team?:  no         Interval History:   Minerva doesn't notice much change with the increased fentanyl dose (87 mcg/hr). She continues to notice pain at the pharyngostomy tube insertion site, chest wound site and in her esophagus. The diarrhea is about the same and her primary team and the GI team are coming up with a diagnostic work up plan that might include a colonoscopy.           Review of Systems:   Palliative Symptom Review (0=no symptom/no concern, 1=mild, 2=moderate, 3=severe):      Pain: 1-2      Fatigue: 1      Nausea: 0      Constipation: 0      Diarrhea: 2-3      Depressive Symptoms: 2      Anxiety: 1-2      Drowsiness: 1      Poor Appetite: N/A      Shortness of Breath: 1      Insomnia: 1      Overall (0 good/no concerns, 3 very poor):  1-2            Medications:   I have reviewed this patient's medication profile and medications given in past 24 hours.  Current Facility-Administered Medications   Medication     lidocaine (XYLOCAINE) 5 % ointment      parenteral nutrition - ADULT compounded formula     diatrizoate meglumine-sodium (GASTROGRAFIN; GASTROVIEW) 66-10 % solution 240 mL     parenteral nutrition - ADULT compounded formula     oxyCODONE (ROXICODONE) solution 10-20 mg     acetaminophen (TYLENOL) solution 975 mg     gabapentin (NEURONTIN) solution 300 mg     Benzocaine (HURRICAINE/TOPEX) 20 % spray     lipids (INTRALIPID) 20 % infusion 200 mL     fentaNYL (DURAGESIC) Patch in Place     [START ON 6/15/2017] fentaNYL (DURAGESIC) patch REMOVAL     fentaNYL (DURAGESIC) 12 mcg/hr 72 hr patch 1 patch     fentaNYL (DURAGESIC) 75 mcg/hr 72 hr patch 1 patch     saccharomyces boulardii (FLORASTOR) capsule 250 mg     enoxaparin (LOVENOX) injection 40 mg     simethicone (MYLICON) suspension 40 mg     bacitracin ointment     chlorhexidine (PERIDEX) 0.12 % solution 15 mL     chlorhexidine (HIBICLENS) 4 % liquid     lidocaine (LIDODERM) 5 % Patch 1 patch    And     lidocaine (LIDODERM) patch REMOVAL    And     lidocaine (LIDODERM) patch in PLACE     lidocaine 1 % 0.5-5 mL     lidocaine (LMX4) kit     sodium chloride (PF) 0.9% PF flush 5-50 mL     lidocaine 1 % 1 mL     lidocaine (LMX4) kit     sodium chloride (PF) 0.9% PF flush 10-20 mL     heparin lock flush 10 UNIT/ML injection 5-10 mL     heparin lock flush 10 UNIT/ML injection 5-10 mL     benzocaine-menthol (CHLORASEPTIC) 6-10 MG lozenge 1 lozenge     heparin lock flush 10 UNIT/ML injection 2-5 mL     metoprolol (LOPRESSOR) suspension 25 mg     sodium chloride (PF) 0.9% PF flush 10-20 mL     sodium chloride (PF) 0.9% PF flush 10 mL     potassium chloride SA (K-DUR/KLOR-CON M) CR tablet 20-40 mEq     potassium chloride (KLOR-CON) Packet 20-40 mEq     potassium chloride 10 mEq in 100 mL intermittent infusion     potassium chloride 10 mEq in 100 mL intermittent infusion with 10 mg lidocaine     potassium chloride 20 mEq in 50 mL intermittent infusion     magnesium sulfate 2 g in NS intermittent infusion (PharMEDium  or FV Cmpd)     magnesium sulfate 4 g in 100 mL sterile water (premade)     potassium phosphate 15 mmol in D5W 250 mL intermittent infusion     potassium phosphate 20 mmol in D5W 500 mL intermittent infusion     potassium phosphate 20 mmol in D5W 250 mL intermittent infusion     potassium phosphate 25 mmol in D5W 500 mL intermittent infusion     sodium chloride (PF) 0.9% PF flush 3 mL     sodium chloride (PF) 0.9% PF flush 3 mL     traZODone (DESYREL) tablet 100 mg     diphenhydrAMINE (BENADRYL) capsule 25 mg     fiber modular (NUTRISOURCE FIBER) packet 1 packet     diphenoxylate-atropine (LOMOTIL) liquid 10 mL     cholestyramine (QUESTRAN) Packet 4 g     loperamide (IMODIUM) liquid 4 mg     opium tincture 10 MG/ML (1%) liquid 6 mg     pantoprazole (PROTONIX) suspension 40 mg     prochlorperazine (COMPAZINE) tablet 5 mg    Or     prochlorperazine (COMPAZINE) injection 5 mg    Or     prochlorperazine (COMPAZINE) Suppository 12.5 mg     dextrose 10 % 1,000 mL infusion     glucose 40 % gel 15-30 g    Or     dextrose 50 % injection 25-50 mL    Or     glucagon injection 1 mg     artificial saliva (BIOTENE DRY MOUTHWASH) liquid 20 mL     ipratropium - albuterol 0.5 mg/2.5 mg/3 mL (DUONEB) neb solution 3 mL     labetalol (NORMODYNE/TRANDATE) injection 10-40 mg     albuterol (PROAIR HFA/PROVENTIL HFA/VENTOLIN HFA) Inhaler 4 puff     albuterol neb solution 2.5 mg     sodium chloride (PF) 0.9% PF flush 3 mL     sodium chloride (PF) 0.9% PF flush 3 mL     naloxone (NARCAN) injection 0.1-0.4 mg     ondansetron (ZOFRAN-ODT) ODT tab 4 mg    Or     ondansetron (ZOFRAN) injection 4 mg     ferrous sulfate 300 (60 FE) MG/5ML syrup 300 mg     Facility-Administered Medications Ordered in Other Encounters   Medication     bupivacaine 0.25 % - EPINEPHrine 1:200,000 injection              Physical Exam:   Vitals were reviewed  Temp: 97.6  F (36.4  C) Temp src: Oral BP: 129/63   Heart Rate: 90 Resp: 16 SpO2: 96 % O2 Device: None (Room air)     Gen: Appears comfortable lying in bed. Occasionally tearful.  Resp: normal work of breathing.   Msk: no gross deformity   Skin:  no jaundice  Ext: warm, well perfused. no edema  Neuro:  EOM, vision, Speech fluent. Moves all extremities equally  Mental status/Psych: alert. Oriented. Affect is normal             Data Reviewed:   none

## 2017-06-14 NOTE — PLAN OF CARE
Problem: Individualization  Goal: Patient Preferences  Outcome: No Change  Afeb, vitals stable, 02 sats 99% on room air, states pain is tolerable, oxycodone x2 with relief, chest drsg dry and intact, belly flat with positive bowel sounds, gas and stool, lungs clear, diminished, pharyngostomy tube to LIWS, 200cc out since midnight, up to commode voiding in good amts, j-tube clamped, ANA M infusing, npo, wanted to sleep thru the night, declined some meds(see emar) thru the night, did appear to sleep at intervals

## 2017-06-14 NOTE — PROGRESS NOTES
Thoracic surgery progress note    No acute events overnight. Some pain this morning. Diarrhea continues    Temp: 97.6  F (36.4  C) Temp  Min: 96.2  F (35.7  C)  Max: 97.6  F (36.4  C)  Resp: 16 Resp  Min: 16  Max: 16  SpO2: 96 % SpO2  Min: 96 %  Max: 100 %    No Data Recorded  Heart Rate: 90 Heart Rate  Min: 65  Max: 90  BP: 129/63 Systolic (24hrs), Av , Min:119 , Max:131   Diastolic (24hrs), Av, Min:60, Max:73    A&O, NAD  Pharyngostomy to LIS w/ tan output in canister  Unlabored breathing on RA  Chest wound with wet green staining on the packing in the corners. The wound base is granulating well  Mediastinal tube w/ scant output  RRR  Abd soft, non tender, non distended    I&O  UOP 1325  Pharyngostomy 375/350    Results  WBC 13.2 -> 10.9    A/P: 72F w/ chronic esophageal perforation s/p Nissen at OSH, now s/p esophagectomy with gastric pull-up c/b recurrent anastomotic leak and mediastinal abscess s/p serial washouts, pharyngostomy tube and most recently EGD w/ stent placement on .      - C/w Fentanyl patch and Oxy PRN. Will d/c IV Dilaudid. Appreciate palliative recs  - Pharyngostomy to gravity today. May be able to d/c chest tube tomorrow or Friday  - Strict NPO. C/w TPN and tube feeds @ goal 6 hours overnight, minimize tube feeding interruptions   - Chronic diarrhea - max doses of anti-motility agents. GI studies pending, holding on SBFT for now per staff. Will discuss CT with staff  - Daily dressing changes  - Lovenox    Discussed w/ fellow, Dr Chinyere Liu MD  Surgery PGY1  [phone]

## 2017-06-14 NOTE — PROGRESS NOTES
"Social Work: Assessment with Discharge Plan    Patient Name:  Minerva Blanco  :  1945  Age:  72 year old  MRN:  [id number]  Risk/Complexity Score:  Filed Complexity Screen Score: 8  Completed assessment with:  Chart review, pt    Presenting Information   Reason for Referral:  Discharge plan  Date of Intake:  2017  Referral Source:  Physician  Decision Maker:  Pt  Alternate Decision Maker:  Per Health Care Directive-  Richard Blanco, alternate is daughter Chandan Sosa  Health Care Directive:  Copy in Chart  Living Situation:  House- condo with spouse. Per OT eval there are no stairs to enter or within the home, there are grab bars in the bathtub, bed and bath are on the same level, and there are grab bars by the toilet.   Previous Functional Status:  Independent  Patient and family understanding of hospitalization:  Restorative. Pt has a very thorough understanding of her medical course and cares.   Cultural/Language/Spiritual Considerations:  Per chart pt identifies as Orthodoxy and per Palliative sw note pt has a \"strong hitesh, sees it as an important support\". No other considerations identified at this time.   Adjustment to Illness:  Per Palliative Sw note pt is \"grieving multiple health related losses appropriately\". During visit pt was quite pre-occupied with her various drains and tubes and reported that she had just gotten new drains/tube and things were pulling on her pharengostomy. Pt reported that her skin on the left side of her chest was irritated as well and seemed somewhat pre-occupied with this as well and did apologize for being pre-occupied.   Overall pt was receptive to sw visit and was engaged appropriately and pleasant.     Physical Health  Reason for Admission:    1. Abscess    2. Esophageal perforation    3. History of esophagectomy      Services Needed/Recommended:  TCU    Mental Health/Chemical Dependency  Diagnosis:  No diagnoses per H&P, per Palliative Sw note pt " "does not have any history or treatment for mental health concerns. Pt presented as somewhat anxious during visit, fidgeting with her tubes/drains and reaching for things on her bedside table. Pt did become tearful at one point when talking about how much of the summer has already passed.   Support/Services in Place:  Pt is currently being followed by Palliative sw for assistance with coping with medical situation/illness.  Services Needed/Recommended:  NA    Support System  Significant relationship at present time:   Enrique. Pt also has 2 daughters, one who lives in [place] with two children, the other daughter lives locally with two children.  Family of origin is available for support:  Yes  Other support available:  Per Palliative Sw note pt has a \"strong hitesh, sees it as an important support\". Prior to admission pt had [facility] services in place.   Gaps in support system:  Dagmar  Patient is caregiver to:  None     Provider Information   Primary Care Physician:  Andrea Nino   [phone]   Clinic:  [place] [address]      :  Unknown    Financial   Income Source:  Retired, spouse is employed  Financial Concerns:  None identified  Insurance:    Payor/Plan Subscriber Name Rel Member # Group #   MEDICARE - MEDICARE F* FAVIAN MALONE  [id number]       ATTN CLAIMS, [address]   BCBS - BCBS PLATINUM * FAVIAN MALONE  [id number]      [address] [address]       Discharge Plan   Patient and family discharge goal:  Pt reported she would like to go home but understands that her doctors are \"adamantly\" wanting her to go to [facility]. Pt reported she needs \"to be smart about it\" and referred to a d/c to [facility] as being inevitable and what will \"probably\" happen. Pt reported that she doesn't want to go to [facility] but is ok with this as a d/c plan.   Provided education on discharge plan:  YES- sw explained that a TCU stay would be looked at as a bridge " "to getting pt home, team would physically go to TCU and monitor/do dressing changes, helping pt be more successful at home, hopefully preventing a re-admission and helping pt be able to be at home for a longer period of time. Romie validated pt's desire to d/c to home and assessed pt's concerns with a TCU stay. Pt reported having gone to TCU in January and it was not a good experience.    Pt is aware that she can make her own medical decisions based on the recommendations provided.   Patient agreeable to discharge plan:  YES  A list of Medicare Certified Facilities was provided to the patient and/or family to encourage patient choice. Patient's choices for facility are:  Pt's Thoracic surgery team is wanting pt to go to [facility] and report their team will do pt's dressing changes M,W,F while at TCU.   Sw informed pt that other TCUs could be looked at as well and pt declined to look at other options reporting that she will do \"what they want me to\" do and go to [facility].   Will NH provide Skilled rehabilitation or complex medical:  TBD, [facility] reviewing pt  General information regarding anticipated insurance coverage and possible out of pocket cost was discussed. Patient and patient's family are aware patient may incur the cost of transportation to the facility, pending insurance payment: YES- aside from transport but if pt goes to  TCU contract with [facility] would result in no cost to pt for transport.   Barriers to discharge:  Medical readiness, Thoracic surgery team reported pt could be ready to d/c next Tuesday or Wednesday.     Discharge Recommendations   Anticipated Disposition:  Facility:  [facility] assessing  Transportation Needs:  Medical:  Wheelchair  Name of Transportation Company and Phone:  [facility]- [phone]      BALJIT Oden, MSW  7B   [phone] (pager) [phone]  6/14/2017      "

## 2017-06-14 NOTE — PLAN OF CARE
Problem: Individualization  Goal: Patient Preferences  Outcome: No Change  RN - Assumed care of pt from 7537-3882. Vitally stable. Pt expressing tearful frustration in setbacks with recovery. Pt c/o generalized pain primarily at abdomen and pharyngostomy tube site. Relieved with ordered J-tube medications. Chest drain and pharyngostomy site to gravity. Pt remains NPO. Pt tolerating tube feeds at 25ml/hr - unwilling to increase to goal rate. TPN infusing via PICC. Magnesium replaced per protocol. Chest dressings remain intact. Pt up ad renea within room. Continues with loose bowel movements despite antidiarrheal medications.

## 2017-06-14 NOTE — PROGRESS NOTES
Palliative Care Inpatient Clinical Social Work Visit    Patient Information:  Minerva is a 72 year old woman with chronic esophageal perforation and recurrent anastomotic leak and mediastinal abscess.  Palliative Consult Service is following for pain management and coping.  I met with Minerva in her room this afternoon.  Her  Enrique arrived and was present for my visit as well.  Minerva was a bit less verbal and engaged this visit.    Relevant Symptoms/Concerns  - New information since last visit   Physical:  Expresses that she has pain around the tube in her neck, as well as a sore area on her chest.  Diarrhea continues to be problematic for her.   Psychological/Emotional/Existential:  Expresses wish to be at home.   Family/Social/Caregiver:   Enrique minimizes the impact of Minerva's illness on him.  Expresses concern re her weight loss, loss of sleep due to diarrhea.   Developmental:      Mental Health:      End of Life:      Cultural/Jew/Spiritual:      Grief/Loss:      Concurrent Stressors:        Comments:       Strengths - New information since last visit    Physical:     Psychological/Emotional/Existential:      Family/Social/Caregiver:  Enrique believes that being in her home environment is beneficial to Minerva.   Developmental:      Mental Health:      End of Life:      Cultural/Jew/Spiritual:      Grief/Loss:         Comments:       Goals/Decision Making/Advance Care Planning - New information since last visit   Preferences:      Concerns:      Documents:      Decision Making Issues:        Comments:       Resource Needs     Discharge Planning:  Per Unit/Program  and/or Care Coordinator   Other:      Comments:       Intervention (Check all that apply)    X   Assessment of palliative specific issues      X   Introduction of Palliative clinical social work interventions (for Enrique)    X   Adjustment to illness counseling        Advanced care planning        Attended/participated  in care conference        Behavioral interventions for symptom management    X   Facilitation of processing of thoughts/feelings        Family communication facilitated        Grief counseling        Goals of care discussion/facilitation        Life legacy work        Life review facilitation        Psychoeducation        Re-framing        Resource referral            Other     Comments:  During an earlier attempted visit with Minerva this afternoon, I offered behavioral intervention during a dressing change/adjustment (which she said was somewhat painful), but she declined.    Plan:  Will continue to follow for further assessment/intervention as indicatedone to two times per week while Minerva is hospitalized.  Minerva knows that I do not plan to see her tomorrow and am out of the office on Friday.

## 2017-06-14 NOTE — PLAN OF CARE
Problem: Infection, Risk/Actual (Adult)  Goal: Identify Related Risk Factors and Signs and Symptoms  Related risk factors and signs and symptoms are identified upon initiation of Human Response Clinical Practice Guideline (CPG)   Outcome: No Change  Vitals:     06/13/17 1628 06/13/17 1952 06/14/17 0000 06/14/17 0753   BP: 127/65 119/73 131/65 129/63   BP Location: Left arm Left arm   Left arm   Cuff Size:           Pulse:           Resp: 16 16 16 16   Temp: 96.2  F (35.7  C) 96.2  F (35.7  C) 97.4  F (36.3  C) 97.6  F (36.4  C)   TempSrc: Oral Oral Oral Oral   SpO2: 97% 100% 99% 96%   Weight:           Height:             Pt up ad renea in room. Some pain and took oxycodone, has fentanyl patch on. Pharyngost tube put to gravity and little out. TF started at 25/hr per patient request. meds given down J tube. Pt agitated at times, calm others. Having loose stools, prescribed meds for loose stools given. Mag was 1.8 and replacement given. Continue to monitor pt status, continue with the POC.

## 2017-06-14 NOTE — PLAN OF CARE
Problem: Goal Outcome Summary  Goal: Goal Outcome Summary  Outcome: No Change  [id number]: A&OX4, VSS on RA. C/o pain in chest that was well managed with PRN Oxy x1. Pharyngostomy tube to LIS draining green output. Tube was irrigated during evening shift and will need to be irrigated again during night shift. Chest wound covered, dressing changed by MD this am. Chest drain to gravity w/ no output. J tube site CDI w/ TF infusing at 10-25 ml/hr. TF turned off at 2200 per pt request. PICC patent w/ TPN infusing @ 60 mL/hr. NPO w/ Ice chips. Up ad renea to commode, voiding spontaneously and continuing to have frequent, loose, watery, BMs. Pt became tearful and expressed embarrassment of having family visit while she is having frequent stools. Family was planning to visit tomorrow but is no longer going to d/t pt concern. Active listening and emotional support provided for pt. Continue to monitor and follow POC.

## 2017-06-15 ENCOUNTER — APPOINTMENT (OUTPATIENT)
Dept: GENERAL RADIOLOGY | Facility: CLINIC | Age: 72
DRG: 856 | End: 2017-06-15
Attending: PHYSICIAN ASSISTANT
Payer: MEDICARE

## 2017-06-15 ENCOUNTER — APPOINTMENT (OUTPATIENT)
Dept: CT IMAGING | Facility: CLINIC | Age: 72
DRG: 856 | End: 2017-06-15
Attending: PHYSICIAN ASSISTANT
Payer: MEDICARE

## 2017-06-15 ENCOUNTER — APPOINTMENT (OUTPATIENT)
Dept: GENERAL RADIOLOGY | Facility: CLINIC | Age: 72
DRG: 856 | End: 2017-06-15
Attending: STUDENT IN AN ORGANIZED HEALTH CARE EDUCATION/TRAINING PROGRAM
Payer: MEDICARE

## 2017-06-15 ENCOUNTER — APPOINTMENT (OUTPATIENT)
Dept: SPEECH THERAPY | Facility: CLINIC | Age: 72
DRG: 856 | End: 2017-06-15
Attending: STUDENT IN AN ORGANIZED HEALTH CARE EDUCATION/TRAINING PROGRAM
Payer: MEDICARE

## 2017-06-15 LAB
ALBUMIN SERPL-MCNC: 2.8 G/DL (ref 3.4–5)
ALP SERPL-CCNC: 423 U/L (ref 40–150)
ALT SERPL W P-5'-P-CCNC: 37 U/L (ref 0–50)
ANION GAP SERPL CALCULATED.3IONS-SCNC: 8 MMOL/L (ref 3–14)
AST SERPL W P-5'-P-CCNC: 25 U/L (ref 0–45)
BILIRUB SERPL-MCNC: 0.8 MG/DL (ref 0.2–1.3)
BUN SERPL-MCNC: 26 MG/DL (ref 7–30)
CALCIUM SERPL-MCNC: 8.7 MG/DL (ref 8.5–10.1)
CHLORIDE SERPL-SCNC: 103 MMOL/L (ref 94–109)
CO2 SERPL-SCNC: 24 MMOL/L (ref 20–32)
CREAT SERPL-MCNC: 0.26 MG/DL (ref 0.52–1.04)
ERYTHROCYTE [DISTWIDTH] IN BLOOD BY AUTOMATED COUNT: 17.9 % (ref 10–15)
GFR SERPL CREATININE-BSD FRML MDRD: ABNORMAL ML/MIN/1.7M2
GLUCOSE SERPL-MCNC: 89 MG/DL (ref 70–99)
HCT VFR BLD AUTO: 29 % (ref 35–47)
HGB BLD-MCNC: 8.9 G/DL (ref 11.7–15.7)
MAGNESIUM SERPL-MCNC: 1.8 MG/DL (ref 1.6–2.3)
MCH RBC QN AUTO: 29.1 PG (ref 26.5–33)
MCHC RBC AUTO-ENTMCNC: 30.7 G/DL (ref 31.5–36.5)
MCV RBC AUTO: 95 FL (ref 78–100)
PHOSPHATE SERPL-MCNC: 2.9 MG/DL (ref 2.5–4.5)
PLATELET # BLD AUTO: 474 10E9/L (ref 150–450)
POTASSIUM SERPL-SCNC: 3.8 MMOL/L (ref 3.4–5.3)
PROT SERPL-MCNC: 7.8 G/DL (ref 6.8–8.8)
RBC # BLD AUTO: 3.06 10E12/L (ref 3.8–5.2)
SODIUM SERPL-SCNC: 135 MMOL/L (ref 133–144)
WBC # BLD AUTO: 10.7 10E9/L (ref 4–11)

## 2017-06-15 PROCEDURE — 25000125 ZZHC RX 250: Performed by: PHYSICIAN ASSISTANT

## 2017-06-15 PROCEDURE — 83735 ASSAY OF MAGNESIUM: CPT | Performed by: SURGERY

## 2017-06-15 PROCEDURE — 85049 AUTOMATED PLATELET COUNT: CPT | Performed by: THORACIC SURGERY (CARDIOTHORACIC VASCULAR SURGERY)

## 2017-06-15 PROCEDURE — 84100 ASSAY OF PHOSPHORUS: CPT | Performed by: SURGERY

## 2017-06-15 PROCEDURE — 36592 COLLECT BLOOD FROM PICC: CPT | Performed by: SURGERY

## 2017-06-15 PROCEDURE — 99207 ZZC CDG-CORRECTLY CODED, REVIEWED AND AGREE: CPT | Performed by: INTERNAL MEDICINE

## 2017-06-15 PROCEDURE — 25000132 ZZH RX MED GY IP 250 OP 250 PS 637: Mod: GY | Performed by: SURGERY

## 2017-06-15 PROCEDURE — 85027 COMPLETE CBC AUTOMATED: CPT | Performed by: SURGERY

## 2017-06-15 PROCEDURE — 25000125 ZZHC RX 250: Performed by: SURGERY

## 2017-06-15 PROCEDURE — A9270 NON-COVERED ITEM OR SERVICE: HCPCS | Mod: GY | Performed by: SURGERY

## 2017-06-15 PROCEDURE — A9270 NON-COVERED ITEM OR SERVICE: HCPCS | Mod: GY | Performed by: STUDENT IN AN ORGANIZED HEALTH CARE EDUCATION/TRAINING PROGRAM

## 2017-06-15 PROCEDURE — 92611 MOTION FLUOROSCOPY/SWALLOW: CPT | Mod: GN | Performed by: SPEECH-LANGUAGE PATHOLOGIST

## 2017-06-15 PROCEDURE — 25000128 H RX IP 250 OP 636: Performed by: STUDENT IN AN ORGANIZED HEALTH CARE EDUCATION/TRAINING PROGRAM

## 2017-06-15 PROCEDURE — 25000132 ZZH RX MED GY IP 250 OP 250 PS 637: Mod: GY | Performed by: STUDENT IN AN ORGANIZED HEALTH CARE EDUCATION/TRAINING PROGRAM

## 2017-06-15 PROCEDURE — A9270 NON-COVERED ITEM OR SERVICE: HCPCS | Mod: GY | Performed by: INTERNAL MEDICINE

## 2017-06-15 PROCEDURE — 74250 X-RAY XM SM INT 1CNTRST STD: CPT

## 2017-06-15 PROCEDURE — 12000003 ZZH R&B CRITICAL UMMC

## 2017-06-15 PROCEDURE — A9270 NON-COVERED ITEM OR SERVICE: HCPCS | Mod: GY | Performed by: PHYSICIAN ASSISTANT

## 2017-06-15 PROCEDURE — 74230 X-RAY XM SWLNG FUNCJ C+: CPT

## 2017-06-15 PROCEDURE — 99233 SBSQ HOSP IP/OBS HIGH 50: CPT | Mod: GC | Performed by: INTERNAL MEDICINE

## 2017-06-15 PROCEDURE — 25000132 ZZH RX MED GY IP 250 OP 250 PS 637: Mod: GY | Performed by: PHYSICIAN ASSISTANT

## 2017-06-15 PROCEDURE — 25000132 ZZH RX MED GY IP 250 OP 250 PS 637: Mod: GY | Performed by: INTERNAL MEDICINE

## 2017-06-15 PROCEDURE — 80053 COMPREHEN METABOLIC PANEL: CPT | Performed by: SURGERY

## 2017-06-15 PROCEDURE — 40000225 ZZH STATISTIC SLP WARD VISIT: Performed by: SPEECH-LANGUAGE PATHOLOGIST

## 2017-06-15 PROCEDURE — 74176 CT ABD & PELVIS W/O CONTRAST: CPT

## 2017-06-15 PROCEDURE — 25000125 ZZHC RX 250: Performed by: STUDENT IN AN ORGANIZED HEALTH CARE EDUCATION/TRAINING PROGRAM

## 2017-06-15 RX ADMIN — LOPERAMIDE HYDROCHLORIDE 4 MG: 1 SOLUTION ORAL at 21:38

## 2017-06-15 RX ADMIN — SODIUM CHLORIDE, PRESERVATIVE FREE 5 ML: 5 INJECTION INTRAVENOUS at 03:54

## 2017-06-15 RX ADMIN — Medication 10 ML: at 17:55

## 2017-06-15 RX ADMIN — OXYCODONE HYDROCHLORIDE 15 MG: 5 SOLUTION ORAL at 12:40

## 2017-06-15 RX ADMIN — SIMETHICONE 40 MG: 20 SUSPENSION/ DROPS ORAL at 17:55

## 2017-06-15 RX ADMIN — TRAZODONE HYDROCHLORIDE 100 MG: 100 TABLET ORAL at 21:37

## 2017-06-15 RX ADMIN — Medication 6 MG: at 17:56

## 2017-06-15 RX ADMIN — OXYCODONE HYDROCHLORIDE 15 MG: 5 SOLUTION ORAL at 19:09

## 2017-06-15 RX ADMIN — MINERAL SUPPLEMENT IRON 300 MG / 5 ML STRENGTH LIQUID 100 PER BOX UNFLAVORED 300 MG: at 08:45

## 2017-06-15 RX ADMIN — GABAPENTIN 300 MG: 250 SOLUTION ORAL at 22:00

## 2017-06-15 RX ADMIN — ALTEPLASE 2 MG: 2.2 INJECTION, POWDER, LYOPHILIZED, FOR SOLUTION INTRAVENOUS at 11:44

## 2017-06-15 RX ADMIN — Medication 10 ML: at 09:02

## 2017-06-15 RX ADMIN — ACETAMINOPHEN 975 MG: 160 SUSPENSION ORAL at 17:59

## 2017-06-15 RX ADMIN — METOPROLOL TARTRATE 25 MG: 100 TABLET ORAL at 08:45

## 2017-06-15 RX ADMIN — SODIUM CHLORIDE, PRESERVATIVE FREE 5 ML: 5 INJECTION INTRAVENOUS at 17:56

## 2017-06-15 RX ADMIN — OXYCODONE HYDROCHLORIDE 15 MG: 5 SOLUTION ORAL at 03:52

## 2017-06-15 RX ADMIN — SIMETHICONE 40 MG: 20 SUSPENSION/ DROPS ORAL at 20:40

## 2017-06-15 RX ADMIN — OXYCODONE HYDROCHLORIDE 15 MG: 5 SOLUTION ORAL at 06:56

## 2017-06-15 RX ADMIN — Medication 250 MG: at 09:59

## 2017-06-15 RX ADMIN — LOPERAMIDE HYDROCHLORIDE 4 MG: 1 SOLUTION ORAL at 17:55

## 2017-06-15 RX ADMIN — PANTOPRAZOLE SODIUM 40 MG: 40 TABLET, DELAYED RELEASE ORAL at 08:44

## 2017-06-15 RX ADMIN — Medication 2 G: at 17:59

## 2017-06-15 RX ADMIN — SIMETHICONE 40 MG: 20 SUSPENSION/ DROPS ORAL at 11:45

## 2017-06-15 RX ADMIN — Medication 10 ML: at 11:44

## 2017-06-15 RX ADMIN — GABAPENTIN 300 MG: 250 SOLUTION ORAL at 16:12

## 2017-06-15 RX ADMIN — Medication 250 MG: at 20:39

## 2017-06-15 RX ADMIN — Medication 6 MG: at 11:44

## 2017-06-15 RX ADMIN — OXYCODONE HYDROCHLORIDE 15 MG: 5 SOLUTION ORAL at 09:59

## 2017-06-15 RX ADMIN — SODIUM CHLORIDE, PRESERVATIVE FREE 5 ML: 5 INJECTION INTRAVENOUS at 07:21

## 2017-06-15 RX ADMIN — LOPERAMIDE HYDROCHLORIDE 4 MG: 1 SOLUTION ORAL at 11:44

## 2017-06-15 RX ADMIN — DIPHENHYDRAMINE HYDROCHLORIDE 25 MG: 25 CAPSULE ORAL at 21:38

## 2017-06-15 RX ADMIN — LIDOCAINE: 50 OINTMENT TOPICAL at 09:06

## 2017-06-15 RX ADMIN — METOPROLOL TARTRATE 25 MG: 100 TABLET ORAL at 20:39

## 2017-06-15 RX ADMIN — Medication 6 MG: at 03:52

## 2017-06-15 RX ADMIN — ACETAMINOPHEN 975 MG: 160 SUSPENSION ORAL at 08:45

## 2017-06-15 RX ADMIN — OXYCODONE HYDROCHLORIDE 15 MG: 5 SOLUTION ORAL at 16:10

## 2017-06-15 RX ADMIN — SODIUM CHLORIDE, PRESERVATIVE FREE 5 ML: 5 INJECTION INTRAVENOUS at 14:03

## 2017-06-15 RX ADMIN — OXYCODONE HYDROCHLORIDE 20 MG: 5 SOLUTION ORAL at 21:38

## 2017-06-15 RX ADMIN — SIMETHICONE 40 MG: 20 SUSPENSION/ DROPS ORAL at 08:44

## 2017-06-15 RX ADMIN — ENOXAPARIN SODIUM 40 MG: 40 INJECTION SUBCUTANEOUS at 12:27

## 2017-06-15 RX ADMIN — CHLORHEXIDINE GLUCONATE 15 ML: 1.2 RINSE ORAL at 13:55

## 2017-06-15 RX ADMIN — Medication 10 ML: at 20:39

## 2017-06-15 RX ADMIN — CHLORHEXIDINE GLUCONATE 15 ML: 1.2 RINSE ORAL at 08:46

## 2017-06-15 RX ADMIN — POTASSIUM CHLORIDE: 2 INJECTION, SOLUTION, CONCENTRATE INTRAVENOUS at 21:54

## 2017-06-15 RX ADMIN — SIMETHICONE 40 MG: 20 SUSPENSION/ DROPS ORAL at 03:53

## 2017-06-15 RX ADMIN — BACITRACIN ZINC: 500 OINTMENT TOPICAL at 09:06

## 2017-06-15 RX ADMIN — GABAPENTIN 300 MG: 250 SOLUTION ORAL at 08:45

## 2017-06-15 NOTE — PLAN OF CARE
Problem: Goal Outcome Summary  Goal: Goal Outcome Summary  Outcome: No Change  Afebrile, vss. Tf increased to 35/hr but has had a lot of diarrhea, tpn at 60/hr.  Pharyngostomy tube to straight drainage.  Chest tube to gravity also. Asking for oxycodone q3 hrs. Fentanyl patches were applied to back.

## 2017-06-15 NOTE — PROGRESS NOTES
06/15/17 1542   General Information   Onset Date 05/18/17   Start of Care Date 06/15/17   Referring Physician Francisco Javier Rogers PA-C   Patient Profile Review/OT: Additional Occupational Profile Info See Profile for full history and prior level of function   Patient/Family Goals Statement Pt stated desire to eat again   Swallowing Evaluation Videofluoroscopic evaluation   Behaviorial Observations WFL (within functional limits)   Mode of current nutrition PEG   Comments Orders received for video swallow study prior to esophgram. 72F w/ chronic esophageal perforation s/p Nissen at OSH, now s/p esophagectomy with gastric pull-up c/b recurrent anastomotic leak and mediastinal abscess s/p serial washouts, pharyngostomy tube and most recently EGD w/ stent placement on 6/4. Video swallow study to further assess airway safety prior to esophagram.    Clinical Swallow Evaluation   Oral Musculature generally intact   Structural Abnormalities none present  (pharyngostomy tube)   Mucosal Quality good   Mandibular Strength and Mobility intact   Oral Labial Strength and Mobility WFL   Lingual Strength and Mobility WFL   Velar Elevation intact   Buccal Strength and Mobility intact   Laryngeal Function Cough;Throat clear;Swallow;Voicing initiated   VFSS Eval: Radiology   Radiologist Resident   Views Taken left lateral;A/P   Physical Location of Procedure [facility] [address]   VFSS Eval: Thin Liquid Texture Trial   Mode of Presentation, Thin Liquid cup;self-fed   Order of Presentation 1,2,3   Preparatory Phase WFL   Oral Phase, Thin Liquid WFL   Pharyngeal Phase, Thin Liquid WFL   Rosenbek's Penetration Aspiration Scale: Thin Liquid Trial Results 1 - no aspiration, contrast does not enter airway   Diagnostic Statement No aspiration/penetration noted on thin liquid trials.    Swallow Compensations   Swallow Compensations No compensations were used   Esophageal Phase of Swallow   Patient reports or presents with symptoms of esophageal  dysphagia Yes   Esophageal comments Please refer to radiology report for further details.    Swallow Eval: Clinical Impressions   Skilled Criteria for Therapy Intervention No problems identified which require skilled intervention   Functional Assessment Scale (FAS) 7   Treatment Diagnosis Functional oropharyngeal swallow   Diet texture recommendations (Per medical team)   Recommended Feeding/Eating Techniques alternate between small bites and sips of food/liquid;maintain upright posture during/after eating for 30 mins;small sips/bites   Demonstrates Need for Referral to Another Service occupational therapy;physical therapy;dietitian   Predicted Duration of Therapy Intervention (days/wks) Evaluation only   Risks and Benefits of Treatment have been explained. Yes   Patient, family and/or staff in agreement with Plan of Care Yes   Clinical Impression Comments Video Swallow Study completed per orders. Pt demonstrates safe functional oropharyngeal swallow at this time. No aspiration/penetration noted. No oral or pharyngeal residue after completed swallow. Timely swallow and adequate laryngeal elevation noted. Please refer to radiologist report for further details on esophgeal portion of the evaluation. Recommend advance diet per thoracic team. From an oropharyngeal stand point pt would tolerate all po consistencies. No fruther SLP services indicated at this time. Please reconsult as needed.    Total Evaluation Time   Total Evaluation Time (Minutes) 19

## 2017-06-15 NOTE — PROGRESS NOTES
"SPIRITUAL HEALTH SERVICES  SPIRITUAL ASSESSMENT Progress Note  [facility] ([place]) U7B     REFERRAL SOURCE:  follow-up for spiritual/emotional support.    Outcome:  Minerva expressed that she is doing a bit better today.  She expressed that it has been like a roller coaster with her issues surrounding her health.  She also expressed that she is missing out on her summer camping trips with her .  iMnerva shared that she wants to go home and is moving into TCU at [place] not to happy with going but also understands.  She was just informed that she well be heading for more testing within the hour.  Minerva states, \"I happy about it\" as she wants to know what is taking place with her health and wellness.  I asked Minerva where she was with her hitesh as she stated, still strong in her hitesh with God.  A prayer was spoken for her and the testing that she well be going into.    PLAN: [facility] will be available for Minerva for the duration of her stay in the hospital.    Tyler Odonnell  Chaplain Resident  Pager [phone]  "

## 2017-06-15 NOTE — PROGRESS NOTES
Thoracic surgery progress note    No acute events overnight. Pain improved.  Feeling much better today. Diarrhea continues    Temp: 97.6  F (36.4  C) Temp  Min: 95.6  F (35.3  C)  Max: 97.9  F (36.6  C)  Resp: 16 Resp  Min: 16  Max: 16  SpO2: 96 % SpO2  Min: 96 %  Max: 100 %    No Data Recorded  Heart Rate: 90 Heart Rate  Min: 78  Max: 90  BP: 110/62 Systolic (24hrs), Av , Min:110 , Max:138   Diastolic (24hrs), Av, Min:60, Max:65    A&O, NAD  Pharyngostomy to gravity w/ gastric contents output in canister  Unlabored breathing on RA  Chest wound with wet green/tan staining on the packing. The wound base is granulating well  Mediastinal tube w/ scant output, serosang  RRR  Abd soft, non tender, non distended    I&O  UOP not recorded  Pharyngostomy 350/225    Results: WBC slowly downtrending    A/P: 72F w/ chronic esophageal perforation s/p Nissen at OSH, now s/p esophagectomy with gastric pull-up c/b recurrent anastomotic leak and mediastinal abscess s/p serial washouts, pharyngostomy tube and most recently EGD w/ stent placement on .      - C/w Fentanyl patch (now at 125/hr) and Oxy PRN. Increased PM gabapentin to 600.   Appreciate palliative recs.  - Continue pharyngostomy to gravity today. Should be able to d/c chest tube tomorrow    - Strict NPO. C/w TPN and tube feeds @ goal 6 hours overnight, minimize tube feeding interruptions   - Chronic diarrhea - max doses of anti-motility agents. GI studies pending, holding on SBFT for now per staff.   - Daily dressing changes  - Jackx    Saul Rogers PA-C  P: [phone]

## 2017-06-15 NOTE — PROGRESS NOTES
"Social Work Services Progress Note    Hospital Day: 29  Date of Initial Social Work Evaluation:  6/14/17- please see for details  Collaborated with:  Chart review, team rounds, [facility] admissions ([facility])    Data:  Pt is being followed for TCU placement and is agreeable to d/c to [facility] if accepted, as this is preferred d/c plan for Thoracic team.     Intervention:  Ovi spoke with Sheree in [facility] admissions and was told that [facility] would be able to \"manage\" pt when medically ready to d/c.     Assessment:  TCU, see bedside RN, PT/OT, Thoracic team notes    Plan:    Anticipated Disposition:  Facility:  [facility] when medically ready    Barriers to d/c plan:  Medical readiness    Follow Up:  ovi GRIMES will continue to follow for d/c planning    BALJIT Oden, MSW  7B   [phone] (pager) [phone]  6/15/2017          "

## 2017-06-15 NOTE — PROGRESS NOTES
CLINICAL NUTRITION SERVICES - BRIEF NOTE  *see RD note from 6/14 for last nutrition reassessment details    - TGs 407 (H) when last checked 6/9. Lipids held since 6/12.    INTERVENTIONS  Implementation  Labs: Ordered TG level for tomorrow morning (recommend check weekly while on PN) to assess ability to add back IV lipids with TPN (pending potential TF improvements).    Monitoring/Evaluation  Progress toward goals will be monitored and evaluated per protocol.     Evie Montanez RD, LD   7B RD Pager: [phone]

## 2017-06-15 NOTE — PLAN OF CARE
Problem: Goal Outcome Summary  Goal: Goal Outcome Summary  Video Swallow Study completed per orders. Pt demonstrates safe functional oropharyngeal swallow at this time. No aspiration/penetration noted. No oral or pharyngeal residue after completed swallow. Timely swallow and adequate laryngeal elevation noted. Please refer to radiologist report for further details on esophgeal portion of the evaluation. Recommend advance diet per thoracic team. From an oropharyngeal stand point pt would tolerate all po consistencies. No fruther SLP services indicated at this time. Please reconsult as needed.

## 2017-06-15 NOTE — PROGRESS NOTES
[facility], [place]   Palliative Care Daily Progress Note          Recommendations, Patient/Family Counseling & Coordination     Pain:  Yesterday afternoon, her fentanyl patch was increased to 125 mcg/hr. Subjectively, she feels her pain is improved although she still needed the following doses of oxycodone 15 mg since midnight : 0400, 0700, 1000, 1245.  She slept better last night which could be from the increased methadone or the increased evening dose of gabapentin. Will leave these doses as they are for now and continue to assess her pain daily.     POLST None     The patient was discussed with Dr. Silvano Ramirez MD  Palliative medicine fellow  Beeper [phone]    Attending Note:  Patient seen and evaluated with Dr Ramirez and I agree with/confirm his findings/recs in this note. She seems to be doing better.     Thank you for involving us in the patient's care.   Robert Schaefer MD / Palliative Medicine / Pager [phone] / [facility] Inpatient Team Consult Pager [phone] (answered 8am-430pm M-F) - ok to text page via [employer] / After-Hours Answering Service [phone] / Palliative Clinic in the [facility] at the [facility] [place] - [phone] (scheduling); [phone] (triage).        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 She gets serial washouts and has an esophageal stent and pharyngostomy tube for drainage. She needs help with coping and more consistent pain control.     2)  Psychosocial/Spiritual Needs:   Ongoing          Is new assessment/intervention required by palliative team?:  no         Interval History:   Minerva states her mood is a bit better today and she feels the increased fentanyl patch dose is making the pain better. She denies being too tired, sleepy or confused. She admits she sometimes takes the  oxycodone when she feels emotionally upset, agitated and overwhelmed but more so she takes it when she is in pain. We explored the idea that she has a lot of emotional pain and how an anti depressant might boost her mood and help her emotional pain. She does not want to pursue an antidepressant at this time.           Review of Systems:   Palliative Symptom Review (0=no symptom/no concern, 1=mild, 2=moderate, 3=severe):      Pain: 1      Fatigue: 1      Nausea: 0      Constipation: 0      Diarrhea: 2-3      Depressive Symptoms: 2      Anxiety: 1-2      Drowsiness: 1      Poor Appetite: N/A      Shortness of Breath: 1      Insomnia: 1      Overall (0 good/no concerns, 3 very poor):  1-2            Medications:   I have reviewed this patient's medication profile and medications given in past 24 hours.  Current Facility-Administered Medications   Medication     parenteral nutrition - ADULT compounded formula     lidocaine (XYLOCAINE) 5 % ointment     parenteral nutrition - ADULT compounded formula     gabapentin (NEURONTIN) solution 300 mg     gabapentin (NEURONTIN) solution 300 mg     fentaNYL (DURAGESIC) Patch in Place     fentaNYL (DURAGESIC) patch REMOVAL     fentaNYL (DURAGESIC) 100 mcg/hr 72 hr patch 1 patch     fentaNYL (DURAGESIC) 25 mcg/hr 72 hr patch 1 patch     oxyCODONE (ROXICODONE) solution 10-20 mg     acetaminophen (TYLENOL) solution 975 mg     Benzocaine (HURRICAINE/TOPEX) 20 % spray     lipids (INTRALIPID) 20 % infusion 200 mL     saccharomyces boulardii (FLORASTOR) capsule 250 mg     enoxaparin (LOVENOX) injection 40 mg     simethicone (MYLICON) suspension 40 mg     bacitracin ointment     chlorhexidine (PERIDEX) 0.12 % solution 15 mL     chlorhexidine (HIBICLENS) 4 % liquid     lidocaine (LIDODERM) 5 % Patch 1 patch    And     lidocaine (LIDODERM) patch REMOVAL    And     lidocaine (LIDODERM) patch in PLACE     lidocaine 1 % 0.5-5 mL     lidocaine (LMX4) kit     sodium chloride (PF) 0.9% PF flush  5-50 mL     lidocaine 1 % 1 mL     lidocaine (LMX4) kit     sodium chloride (PF) 0.9% PF flush 10-20 mL     heparin lock flush 10 UNIT/ML injection 5-10 mL     heparin lock flush 10 UNIT/ML injection 5-10 mL     benzocaine-menthol (CHLORASEPTIC) 6-10 MG lozenge 1 lozenge     heparin lock flush 10 UNIT/ML injection 2-5 mL     metoprolol (LOPRESSOR) suspension 25 mg     sodium chloride (PF) 0.9% PF flush 10-20 mL     sodium chloride (PF) 0.9% PF flush 10 mL     potassium chloride SA (K-DUR/KLOR-CON M) CR tablet 20-40 mEq     potassium chloride (KLOR-CON) Packet 20-40 mEq     potassium chloride 10 mEq in 100 mL intermittent infusion     potassium chloride 10 mEq in 100 mL intermittent infusion with 10 mg lidocaine     potassium chloride 20 mEq in 50 mL intermittent infusion     magnesium sulfate 2 g in NS intermittent infusion (PharMEDium or FV Cmpd)     magnesium sulfate 4 g in 100 mL sterile water (premade)     potassium phosphate 15 mmol in D5W 250 mL intermittent infusion     potassium phosphate 20 mmol in D5W 500 mL intermittent infusion     potassium phosphate 20 mmol in D5W 250 mL intermittent infusion     potassium phosphate 25 mmol in D5W 500 mL intermittent infusion     sodium chloride (PF) 0.9% PF flush 3 mL     sodium chloride (PF) 0.9% PF flush 3 mL     traZODone (DESYREL) tablet 100 mg     diphenhydrAMINE (BENADRYL) capsule 25 mg     fiber modular (NUTRISOURCE FIBER) packet 1 packet     diphenoxylate-atropine (LOMOTIL) liquid 10 mL     cholestyramine (QUESTRAN) Packet 4 g     loperamide (IMODIUM) liquid 4 mg     opium tincture 10 MG/ML (1%) liquid 6 mg     pantoprazole (PROTONIX) suspension 40 mg     prochlorperazine (COMPAZINE) tablet 5 mg    Or     prochlorperazine (COMPAZINE) injection 5 mg    Or     prochlorperazine (COMPAZINE) Suppository 12.5 mg     dextrose 10 % 1,000 mL infusion     glucose 40 % gel 15-30 g    Or     dextrose 50 % injection 25-50 mL    Or     glucagon injection 1 mg     artificial  saliva (BIOTENE DRY MOUTHWASH) liquid 20 mL     ipratropium - albuterol 0.5 mg/2.5 mg/3 mL (DUONEB) neb solution 3 mL     labetalol (NORMODYNE/TRANDATE) injection 10-40 mg     albuterol (PROAIR HFA/PROVENTIL HFA/VENTOLIN HFA) Inhaler 4 puff     albuterol neb solution 2.5 mg     sodium chloride (PF) 0.9% PF flush 3 mL     sodium chloride (PF) 0.9% PF flush 3 mL     naloxone (NARCAN) injection 0.1-0.4 mg     ondansetron (ZOFRAN-ODT) ODT tab 4 mg    Or     ondansetron (ZOFRAN) injection 4 mg     ferrous sulfate 300 (60 FE) MG/5ML syrup 300 mg     Facility-Administered Medications Ordered in Other Encounters   Medication     bupivacaine 0.25 % - EPINEPHrine 1:200,000 injection              Physical Exam:   Vitals were reviewed  Temp: 97.6  F (36.4  C) Temp src: Oral BP: 110/62   Heart Rate: 90 Resp: 16 SpO2: 96 % O2 Device: None (Room air)    Gen: Appears comfortable in good spirits.   HEENT:  Conjunctiva clear. Sclera anicteric.  Resp: normal work of breathing  Abd: soft, nt, nd, +BS G tube site clear.  Msk: no gross deformity   Skin:  no jaundice  Ext: warm, well perfused. no edema  Neuro:  EOM, vision, Speech fluent. Moves all extremities equally  Mental status/Psych: alert. Oriented. Asks/answers questions appropriately. Affect is more upbeat today.               Data Reviewed:   Last Basic Metabolic Panel:  Lab Results   Component Value Date     06/15/2017      Lab Results   Component Value Date    POTASSIUM 3.8 06/15/2017     Lab Results   Component Value Date    CHLORIDE 103 06/15/2017     Lab Results   Component Value Date    ANNABELLE 8.7 06/15/2017     Lab Results   Component Value Date    CO2 24 06/15/2017     Lab Results   Component Value Date    BUN 26 06/15/2017     Lab Results   Component Value Date    CR 0.26 06/15/2017     Lab Results   Component Value Date    GLC 89 06/15/2017

## 2017-06-15 NOTE — PLAN OF CARE
Problem: Individualization  Goal: Patient Preferences  Outcome: No Change  Afeb, vitals stable, 02 sats 100% on room air, c/o chest/incision pain, oxycodone x1 with relief, appeared to be sleeping well at midnight, chest drsg dry and intact, belly flat with positive bowel sounds, gas and stool, lungs diminished in bases, up to commode per self, voiding in good amts, ANA M infusing, pharyngostomy tube to gravity with mod amts out, irrigates easily, chest drain patent, to gravity with scant out, offers no further c/o,

## 2017-06-15 NOTE — PLAN OF CARE
Problem: Goal Outcome Summary  Goal: Goal Outcome Summary  Outcome: No Change  [id number]. Pt had SBFT and swallow study. MD stopped by and ordered pharyngostomy tube back to LIS, green output. Chest dressing CDI. Drain to gravity drainage. Refused TF for most of 4 hours. TPN infusing. meds thru J tube. Oxycodone prn for pain. Up ind to BSC with frequent diarrhea. Mag replaced.

## 2017-06-15 NOTE — PLAN OF CARE
Problem: Individualization  Goal: Patient Preferences  Outcome: No Change  RN - Vitals stable. Pt pain controlled with oxycodone. Continues to note loose stools, more prevalent when tube feeds are infusing. Pt refusing to run tube feeds until noon - pt only tolerating 1 hour of infusion before turning pump off her self. G/J tube clamped in between feed/flushes. TPN infusing via PICC line. Pt required TPA for PICC lumen occlusion - now functional following administration. Chest dressings changed per primary team today. Pharyngostomy tube and chest drain remain to gravity. Scant light green pharyngostomy output noted - irrigated x1 as ordered. Minimal output noted from chest drain. Pt up to commode at bedside. Planning for swallow study and SBFT this afternoon. Magnesium will require replacement per protocol this evening.

## 2017-06-16 LAB
ANION GAP SERPL CALCULATED.3IONS-SCNC: 10 MMOL/L (ref 3–14)
BUN SERPL-MCNC: 19 MG/DL (ref 7–30)
CALCIUM SERPL-MCNC: 8.6 MG/DL (ref 8.5–10.1)
CHLORIDE SERPL-SCNC: 104 MMOL/L (ref 94–109)
CO2 SERPL-SCNC: 23 MMOL/L (ref 20–32)
CREAT SERPL-MCNC: 0.29 MG/DL (ref 0.52–1.04)
GFR SERPL CREATININE-BSD FRML MDRD: ABNORMAL ML/MIN/1.7M2
GLUCOSE SERPL-MCNC: 92 MG/DL (ref 70–99)
MAGNESIUM SERPL-MCNC: 1.8 MG/DL (ref 1.6–2.3)
PHOSPHATE SERPL-MCNC: 3.4 MG/DL (ref 2.5–4.5)
POTASSIUM SERPL-SCNC: 3.8 MMOL/L (ref 3.4–5.3)
SODIUM SERPL-SCNC: 136 MMOL/L (ref 133–144)
TRIGL SERPL-MCNC: 73 MG/DL

## 2017-06-16 PROCEDURE — 25000125 ZZHC RX 250: Performed by: SURGERY

## 2017-06-16 PROCEDURE — 83735 ASSAY OF MAGNESIUM: CPT | Performed by: STUDENT IN AN ORGANIZED HEALTH CARE EDUCATION/TRAINING PROGRAM

## 2017-06-16 PROCEDURE — A9270 NON-COVERED ITEM OR SERVICE: HCPCS | Mod: GY | Performed by: SURGERY

## 2017-06-16 PROCEDURE — 12000003 ZZH R&B CRITICAL UMMC

## 2017-06-16 PROCEDURE — 25000125 ZZHC RX 250: Performed by: PHYSICIAN ASSISTANT

## 2017-06-16 PROCEDURE — 80048 BASIC METABOLIC PNL TOTAL CA: CPT | Performed by: STUDENT IN AN ORGANIZED HEALTH CARE EDUCATION/TRAINING PROGRAM

## 2017-06-16 PROCEDURE — A9270 NON-COVERED ITEM OR SERVICE: HCPCS | Mod: GY | Performed by: INTERNAL MEDICINE

## 2017-06-16 PROCEDURE — 25000125 ZZHC RX 250: Performed by: STUDENT IN AN ORGANIZED HEALTH CARE EDUCATION/TRAINING PROGRAM

## 2017-06-16 PROCEDURE — 99207 ZZC CDG-CORRECTLY CODED, REVIEWED AND AGREE: CPT | Performed by: INTERNAL MEDICINE

## 2017-06-16 PROCEDURE — 25000132 ZZH RX MED GY IP 250 OP 250 PS 637: Mod: GY | Performed by: SURGERY

## 2017-06-16 PROCEDURE — 25000132 ZZH RX MED GY IP 250 OP 250 PS 637: Mod: GY | Performed by: PHYSICIAN ASSISTANT

## 2017-06-16 PROCEDURE — 25000132 ZZH RX MED GY IP 250 OP 250 PS 637: Mod: GY | Performed by: STUDENT IN AN ORGANIZED HEALTH CARE EDUCATION/TRAINING PROGRAM

## 2017-06-16 PROCEDURE — A9270 NON-COVERED ITEM OR SERVICE: HCPCS | Mod: GY | Performed by: STUDENT IN AN ORGANIZED HEALTH CARE EDUCATION/TRAINING PROGRAM

## 2017-06-16 PROCEDURE — 25000132 ZZH RX MED GY IP 250 OP 250 PS 637: Mod: GY | Performed by: INTERNAL MEDICINE

## 2017-06-16 PROCEDURE — 40000558 ZZH STATISTIC CVC DRESSING CHANGE

## 2017-06-16 PROCEDURE — 25000128 H RX IP 250 OP 636: Performed by: STUDENT IN AN ORGANIZED HEALTH CARE EDUCATION/TRAINING PROGRAM

## 2017-06-16 PROCEDURE — A9270 NON-COVERED ITEM OR SERVICE: HCPCS | Mod: GY | Performed by: PHYSICIAN ASSISTANT

## 2017-06-16 PROCEDURE — 99233 SBSQ HOSP IP/OBS HIGH 50: CPT | Performed by: INTERNAL MEDICINE

## 2017-06-16 PROCEDURE — 36592 COLLECT BLOOD FROM PICC: CPT | Performed by: STUDENT IN AN ORGANIZED HEALTH CARE EDUCATION/TRAINING PROGRAM

## 2017-06-16 PROCEDURE — 84478 ASSAY OF TRIGLYCERIDES: CPT | Performed by: STUDENT IN AN ORGANIZED HEALTH CARE EDUCATION/TRAINING PROGRAM

## 2017-06-16 PROCEDURE — 84100 ASSAY OF PHOSPHORUS: CPT | Performed by: STUDENT IN AN ORGANIZED HEALTH CARE EDUCATION/TRAINING PROGRAM

## 2017-06-16 RX ORDER — GABAPENTIN 250 MG/5ML
300 SOLUTION ORAL 2 TIMES DAILY
Status: DISCONTINUED | OUTPATIENT
Start: 2017-06-16 | End: 2017-06-28

## 2017-06-16 RX ORDER — DIPHENHYDRAMINE HCL 25 MG
25 CAPSULE ORAL
Status: DISCONTINUED | OUTPATIENT
Start: 2017-06-16 | End: 2017-06-20

## 2017-06-16 RX ORDER — CHOLESTYRAMINE 4 G/9G
1 POWDER, FOR SUSPENSION ORAL 2 TIMES DAILY
Status: DISCONTINUED | OUTPATIENT
Start: 2017-06-16 | End: 2017-06-29 | Stop reason: HOSPADM

## 2017-06-16 RX ORDER — GUAR GUM
2 PACKET (EA) ORAL 3 TIMES DAILY
Status: DISCONTINUED | OUTPATIENT
Start: 2017-06-16 | End: 2017-06-29 | Stop reason: HOSPADM

## 2017-06-16 RX ORDER — GABAPENTIN 250 MG/5ML
300 SOLUTION ORAL 2 TIMES DAILY
Status: DISCONTINUED | OUTPATIENT
Start: 2017-06-17 | End: 2017-06-16

## 2017-06-16 RX ORDER — MORPHINE 10 MG/ML
0.6 TINCTURE ORAL 4 TIMES DAILY
Status: DISCONTINUED | OUTPATIENT
Start: 2017-06-16 | End: 2017-06-23

## 2017-06-16 RX ORDER — GABAPENTIN 250 MG/5ML
600 SOLUTION ORAL AT BEDTIME
Status: DISCONTINUED | OUTPATIENT
Start: 2017-06-16 | End: 2017-06-28

## 2017-06-16 RX ORDER — SIMETHICONE 40MG/0.6ML
40 SUSPENSION, DROPS(FINAL DOSAGE FORM)(ML) ORAL 4 TIMES DAILY
Status: DISCONTINUED | OUTPATIENT
Start: 2017-06-16 | End: 2017-06-29 | Stop reason: HOSPADM

## 2017-06-16 RX ADMIN — SIMETHICONE 40 MG: 20 SUSPENSION/ DROPS ORAL at 17:22

## 2017-06-16 RX ADMIN — Medication 10 ML: at 13:49

## 2017-06-16 RX ADMIN — Medication 6 MG: at 05:15

## 2017-06-16 RX ADMIN — LOPERAMIDE HYDROCHLORIDE 4 MG: 1 SOLUTION ORAL at 11:24

## 2017-06-16 RX ADMIN — Medication 10 ML: at 17:23

## 2017-06-16 RX ADMIN — OXYCODONE HYDROCHLORIDE 15 MG: 5 SOLUTION ORAL at 18:21

## 2017-06-16 RX ADMIN — Medication 2 PACKET: at 13:48

## 2017-06-16 RX ADMIN — SIMETHICONE 40 MG: 20 SUSPENSION/ DROPS ORAL at 05:15

## 2017-06-16 RX ADMIN — BACITRACIN ZINC: 500 OINTMENT TOPICAL at 08:36

## 2017-06-16 RX ADMIN — SODIUM CHLORIDE, PRESERVATIVE FREE 5 ML: 5 INJECTION INTRAVENOUS at 13:03

## 2017-06-16 RX ADMIN — ACETAMINOPHEN 975 MG: 160 SUSPENSION ORAL at 17:22

## 2017-06-16 RX ADMIN — POTASSIUM CHLORIDE: 2 INJECTION, SOLUTION, CONCENTRATE INTRAVENOUS at 21:09

## 2017-06-16 RX ADMIN — Medication 10 ML: at 21:21

## 2017-06-16 RX ADMIN — Medication 10 ML: at 09:27

## 2017-06-16 RX ADMIN — OXYCODONE HYDROCHLORIDE 15 MG: 5 SOLUTION ORAL at 11:26

## 2017-06-16 RX ADMIN — LOPERAMIDE HYDROCHLORIDE 4 MG: 1 SOLUTION ORAL at 08:34

## 2017-06-16 RX ADMIN — OXYCODONE HYDROCHLORIDE 20 MG: 5 SOLUTION ORAL at 21:39

## 2017-06-16 RX ADMIN — SIMETHICONE 40 MG: 20 SUSPENSION/ DROPS ORAL at 08:38

## 2017-06-16 RX ADMIN — BACITRACIN ZINC: 500 OINTMENT TOPICAL at 21:53

## 2017-06-16 RX ADMIN — OXYCODONE HYDROCHLORIDE 15 MG: 5 SOLUTION ORAL at 15:26

## 2017-06-16 RX ADMIN — PANTOPRAZOLE SODIUM 40 MG: 40 TABLET, DELAYED RELEASE ORAL at 08:34

## 2017-06-16 RX ADMIN — CHLORHEXIDINE GLUCONATE 15 ML: 1.2 RINSE ORAL at 21:58

## 2017-06-16 RX ADMIN — ACETAMINOPHEN 975 MG: 160 SUSPENSION ORAL at 08:36

## 2017-06-16 RX ADMIN — Medication 6 MG: at 17:25

## 2017-06-16 RX ADMIN — SIMETHICONE 40 MG: 20 SUSPENSION/ DROPS ORAL at 11:24

## 2017-06-16 RX ADMIN — OXYCODONE HYDROCHLORIDE 15 MG: 5 SOLUTION ORAL at 05:14

## 2017-06-16 RX ADMIN — DIPHENHYDRAMINE HYDROCHLORIDE 25 MG: 25 CAPSULE ORAL at 21:25

## 2017-06-16 RX ADMIN — LOPERAMIDE HYDROCHLORIDE 4 MG: 1 SOLUTION ORAL at 17:23

## 2017-06-16 RX ADMIN — Medication 250 MG: at 08:35

## 2017-06-16 RX ADMIN — Medication 6 MG: at 21:24

## 2017-06-16 RX ADMIN — Medication 2 G: at 10:44

## 2017-06-16 RX ADMIN — TRAZODONE HYDROCHLORIDE 100 MG: 100 TABLET ORAL at 21:25

## 2017-06-16 RX ADMIN — MINERAL SUPPLEMENT IRON 300 MG / 5 ML STRENGTH LIQUID 100 PER BOX UNFLAVORED 300 MG: at 08:36

## 2017-06-16 RX ADMIN — SODIUM CHLORIDE, PRESERVATIVE FREE 5 ML: 5 INJECTION INTRAVENOUS at 17:24

## 2017-06-16 RX ADMIN — METOPROLOL TARTRATE 25 MG: 100 TABLET ORAL at 21:25

## 2017-06-16 RX ADMIN — GABAPENTIN 300 MG: 250 SOLUTION ORAL at 15:26

## 2017-06-16 RX ADMIN — OXYCODONE HYDROCHLORIDE 20 MG: 5 SOLUTION ORAL at 08:26

## 2017-06-16 RX ADMIN — Medication 6 MG: at 11:24

## 2017-06-16 RX ADMIN — METOPROLOL TARTRATE 25 MG: 100 TABLET ORAL at 08:35

## 2017-06-16 RX ADMIN — GABAPENTIN 600 MG: 250 SOLUTION ORAL at 21:45

## 2017-06-16 RX ADMIN — ENOXAPARIN SODIUM 40 MG: 40 INJECTION SUBCUTANEOUS at 11:23

## 2017-06-16 RX ADMIN — LOPERAMIDE HYDROCHLORIDE 4 MG: 1 SOLUTION ORAL at 21:23

## 2017-06-16 RX ADMIN — GABAPENTIN 300 MG: 250 SOLUTION ORAL at 08:53

## 2017-06-16 RX ADMIN — SIMETHICONE 40 MG: 20 SUSPENSION/ DROPS ORAL at 22:00

## 2017-06-16 RX ADMIN — Medication 250 MG: at 22:03

## 2017-06-16 NOTE — PLAN OF CARE
Problem: Goal Outcome Summary  Goal: Goal Outcome Summary  Patient does not want to be bothered after midnight, have been sleeping soundly after hs meds, was given Benadryl 25 mg for itchiness in P-tube site. Clear LS. +BS, still having loose stools, mixed with urine. TF turned off as per pt, J-tube clamped, declines restarting TF until Team rounds. TPN at 60 ml/hr. P-tube to LIS, green colored output. Chest drain to gravity, no output. Voiding spontaneously. Oxycodone 20 mg po prn given for pain. Fentanyl patches in upper back. Wound dressing cdi. Continue poc.

## 2017-06-16 NOTE — PLAN OF CARE
Problem: Individualization  Goal: Patient Preferences  Outcome: No Change  No tube feeding since 2300 on 6/15 per current orders. Current orders are for 16hr of tube feeding with 8hrs off at night daily. Call out to dietary to deliver tube feeding formula for this am. Will start TF as soon as tube feeding is available.

## 2017-06-16 NOTE — PROGRESS NOTES
Thoracic surgery progress note    No acute events overnight. Intermittent pain at pharyngostomy site.  Feeling much better today. Diarrhea continues.    Temp: 96.8  F (36  C) Temp  Min: 96  F (35.6  C)  Max: 96.8  F (36  C)  Resp: 16 Resp  Min: 16  Max: 16  SpO2: 100 % SpO2  Min: 100 %  Max: 100 %    No Data Recorded  Heart Rate: 76 Heart Rate  Min: 76  Max: 77  BP: 116/58 Systolic (24hrs), Av , Min:116 , Max:125   Diastolic (24hrs), Av, Min:53, Max:58    A&O, NAD  Pharyngostomy to gravity w/ gastric contents in canister  Unlabored breathing on RA  Chest wound with wet green/tan staining on the packing. The wound base is granulating well  Mediastinal tube w/ scant output, serosang  Abd soft, non tender, non distended    I&O  UOP not recorded  Pharyngostomy 225/150    Results: WBC slowly downtrending    A/P: 72F w/ chronic esophageal perforation s/p Nissen at OSH, now s/p esophagectomy with gastric pull-up c/b recurrent anastomotic leak and mediastinal abscess s/p serial washouts, pharyngostomy tube and most recently EGD w/ stent placement on .      - C/w Fentanyl patch (now at 125/hr) and Oxy PRN. Increased PM gabapentin to 600.  Appreciate palliative recs.  - Continue pharyngostomy to gravity today. Should be able to d/c chest tube tomorrow.  - Strict NPO. C/w TPN and tube feeds @ goal 6 hours overnight, minimize tube feeding interruptions   - Chronic diarrhea - max doses of anti-motility agents. GI studies pending  - Daily dressing changes  - Deja Rogers PA-C  P: [phone]

## 2017-06-16 NOTE — PROGRESS NOTES
Nutrition Note Brief:    RD following pt for EN and PN support assessments. Pt was last reassessed on 17 (please refer to RD note from that date for details.     Current EN Regimen as ordered: Two Mehrdad HN  @ 35 ml/hr x 16 hours - 560 ml of TF/day with 1120 kcal/day, 47 gm protein, 123 gm CHO, 51 gm fat, 2.8 gm fiber, 392 ml free water. Pt has 30 ml fluid flushes q4h ordered for patency.     Current PN Regimen as ordered: Per DW of 39 kg @ 60 ml/hr with 165 gm Dextrose (GIR= 2.9 mg/kg/min), 65 gm AA with no lipids - provides 821 kcal/day (21 kcal/kg) and 1.5 gm/kg protein.     Currently lipids held (has been held since 17) 2/2 increased TG levels. TG levels rechecked today with wnl results (T).     Noted pt has not been receiving her goal volumes for EN since her last reassessment. Noted per RN note today pt declined starting her TF this am. Pt has received a total pf 455 ml of TF in the last 2 days ( and 6/15) which provided ~ 455 kcal/day (12 kcal/kg DW of 39 kg) and  ~ 19 gm Protein/day ( 0.5 gm protein/kg DW of 39).    Interventions:   - Discussed with Pharm D regarding adding back daily 20% 250 ml lipids to increase caloric provisions of PN support given pt not receiving goal volume for TF. Current recommended Regimen: Per DW: @ 1440 ml/day with 165 gm Dextrose (GIR=2.9 mg/kg/min), 65 gm AA, 20% 250 ml lipids ( provides 1321 kcal/day (34 kcal/kg), 1.7 gm protein/kg).   - Recommend monitoring weekly fasting TG levels, LFTs, BG per PN protocol.   - Recommend minimizing interruptions to TF infusion as able and infuse TF at gaol volume.    RD will continue to follow pt and will reassess pt per protocol. Please consult RD if further questions or concerns related to nutrition comes up before then.       Venecia Jordan RD LD  Weekday Pager - [phone]  Weekend Pager - [phone]     Addendum to above Note:    Received page from GI NP regarding increasing fiber to 2 packets Nutrisource Fiber (guar  gum) TID (provides 3  Gm/packet) vs 1 packet TID. Will order increased dose of fiber.     Venecia Jordan RD LD  Weekday Pager - [phone]  Weekend Pager - [phone]

## 2017-06-16 NOTE — PROGRESS NOTES
"[facility], [place]   Palliative Care Daily Progress Note          Recommendations, Patient/Family Counseling & Coordination     Pain:    - I think it is reasonably controlled. She is still using very regular prn oxycodone doses of every 3 hours during the day but got 7 hour stretch overnight without pain medications (and it likely would have been longer if she had not been woken up for early AM medications). Despite her frequent prn oxy use i think we should keep her medication doses the same as she is comfortable with them and feeling that pain is controlled and not having any bad side effects.  Will leave these doses as they are for now and continue to assess her pain.     Coping/sleep/general issues: overall her biggest issue today that she talks about is frustration over getting woken up early AM to take medications and her overall loss of control and her time away from home. She really wants to get home as soon as she can. She is very tearful but also says she is hopeful for all the things she will eventually be able to do if she can get home--so she says she does not feel \"depressed\" but more frustrated, getting impatient and wanting some good sleep and more control. To address this today I did the following:  - went through her medication list and adjusted dose times of medications so that there ear no medications ordered between the hours of 10PM and 8AM. The only dose adjustment needed for this change was making simethicone QID rather than q 4 hours (discussed with surgery team). Other adjustments were only changes to time but not total daily dose.   - changed the prn benadryl order to include indication for sleep  - supportive listening  - placed nursing patient care order to please not disturb patient unless absolutely necessary between hours of 10pm and 8AM.       POLST --n/a full code       Total time spent was 60 minutes, >50% of time was spent counseling and/or " coordination of care regarding symptoms, support.    Janice Ventura MD / Palliative Medicine / Pager [phone] / [facility] Inpatient Team Consult Pager [phone] (answered 8am-430pm M-F) - ok to text page via [employer] / After-Hours Answering Service [phone] / Palliative Clinic in the [facility] at the [facility] - [phone] (scheduling); [phone] (triage).        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 She gets serial washouts and has an esophageal stent and pharyngostomy tube for drainage. She needs help with coping and more consistent pain control.     2)  Psychosocial/Spiritual Needs:   Ongoing          Is new assessment/intervention required by palliative team?:  no         Interval History:   Chest tube removed today  Got new yesterday that there was still a small perforation present seen on imaging--this was upsetting to her  Pt says pain is reasonably well controlled. Most frustrating/distressing issue to her today is her loss of control and her getting woken up early in AM for medications that are ordered for early times. Says she did sleep pretty well last night after taking a benadryl for itching around her pharyngostomy tube and reports that she does take benadryl at home for sleep and it helps her a lot           Review of Systems:   Palliative Symptom Review (0=no symptom/no concern, 1=mild, 2=moderate, 3=severe):        Pain: 1      Fatigue: 1      Nausea: 0      Constipation: 0      Diarrhea: 2-3      Depressive Symptoms: 2--really wants to be home      Anxiety: 1-2      Drowsiness: 1      Poor Appetite: N/A      Shortness of Breath: 1      Insomnia: 2--gets woken up too early      Overall (0 good/no concerns, 3 very poor):  1-2            Medications:   I have reviewed this patient's medication profile and medications given in past 24  hours.    Symptom medications of note:  Tylenol 975 TID  Lomotil QID  Fentanyl patch 125mcg/hr  Gabapentin 300/300/600  lidoderm patch  Imodium  tincutre of opium   Trazodone 100 qhs  Simethicone q4 hours    PRN biotiene--none  PRN cloraseptic lozenge--minimal use  Prn benadryl at bedtime for itching--minimal use but did use 1st dose last night  PRN zofran--no use  Prn oxycodone 10-20mg q 3 hours prn --using every 3 hours while awake; no doses between 4861-4181 (~7 hour window)  PRN compazine--           Physical Exam:   Vitals were reviewed  Temp: 96.8  F (36  C) Temp src: Oral BP: 116/58   Heart Rate: 76 Resp: 16 SpO2: 100 % O2 Device: None (Room air)       Gen: Appears comfortable, but tearful, frustrated   HEENT:  Conjunctiva clear. Sclera anicteric. Pharyngostomy tube site clean, mild erythema round skin, dry  Resp: normal work of breathing  Msk: no gross deformity but notable sarcopenia with temporal wasting  Skin:  no jaundice  Ext: warm, well perfused. no edema  Neuro:  EOM, vision, Speech fluent. Moves all extremities equally  Mental status/Psych: alert. Oriented. Asks/answers questions appropriately. Affect tired, frustrated, tearful               Data Reviewed:     GFR>90    Xray small bowel follow-though:  Impression:   Transit time from the percutaneous jejunostomy tube to the cecum is  approximately 75 minutes. No evidence of obstruction.    Other recent labs, imaging reviewed in Epic

## 2017-06-16 NOTE — PLAN OF CARE
Problem: Goal Outcome Summary  Goal: Goal Outcome Summary  Outcome: No Change  Pt declined starting tube feeding at 10am. Currently finishing up her medications. No c/o n/v. Having loose stools. Will start tube feeding when pt is ready. Continue with current cares.

## 2017-06-16 NOTE — PLAN OF CARE
Problem: Goal Outcome Summary  Goal: Goal Outcome Summary  Outcome: Improving  Alert and oriented x 4. VSS. Strict NPO. Constant sharp pain reported on left neck and on abdomen. Given 20mg oxycodone x1 and 15mg x1. Reported pain slightly decreased with pain meds. TPN continues at 60ml/hr. Pt is starting back on lipids today.TF started at 1111 at 25ml/hr as per pt's request. Tolerating well. No N/V reported. Diarrhea continues, watery and yellow in color. Chest tube drainage discontinued in am by MD. Dressing intact and no drainage noted. Pt is started on strict I & O. Refused cholestyramine packet in am. As per order, nurse to notify thoracic surgery if pt refused cholestyramine. Mg level 1.8. Replaced as per protocol. Pt ambulating in hallway, disconnected pharyngostomy from LIS. Please connect it back to LIS when pt is ready. Continue with plan of care.

## 2017-06-16 NOTE — PROGRESS NOTES
GASTROENTEROLOGY PROGRESS NOTE      Date of Admission:  5/18/2017          ASSESSMENT AND RECOMMENDATIONS:   Assessment:  Minerva Blanco is a 72 year old female with a history of chronic esophageal perforation, Nissen procedure, most recently esophageal repair with retrosternal gastric pull through (4/17/2017), c/b recurrent anastomotic leak, mediastinal abscess s/p serial washout, pharyngostomy tube, EGD with stent placement (6/4/2017), spit fistula take down, J tube placement in April 2017, admitted in hospital. GI consulted for persistent diarrhea. Normal fecal fat, calprotectin, elastase, alpha 1 antitrypsin, tTG and total IgA levels.    Patient had small bowel follow through was completed on 6/15/17 and contrast transit time jejunostomy tube to the cecum was reasonable time, no concern of the location of J-tube. Patient's diarrhea it is likely from the tube feeds and plan is to increase patient's fiber.      Recommendation  -- Increase fiber to 2 packets x3/day, patient gets bloating with fiber and simethicone prn might be helpful  -- Consider restarting cholestyramine     -- Accurate documentation of stool output  -- GI team is signing off patient to primary team    Gastroenterology follow up recommendations: To be determined    Patient care plan discussed with Dr. Parmar, GI staff physician. Thank you for involving us in this patient's care. Please do not hesitate to contact the GI service with any questions or concerns.     Chiki Braun CNP  Department of Gastroenterology   -------------------------------------------------------------------------------------------------------------------   History is obtained from the patient and the medical record.          History of Present Illness:   Minerva Blanco is a 72 year old female with a history of  chronic esophageal perforation, Nissen procedure, most recently esophagectomy with retrosternal gastric pull through (4/17/2017), c/b recurrent  anastomotic leak, mediastinal abscess s/p serial washout, pharyngostomy tube, EGD w stent placement (6/4/2017), spit fistula take down, J tube placement in April 2017, admitted in hospital. GI consulted for persistent diarrhea.  According to patient she has h/o IBS before and was well controlled but since her surgery on April 17 she is having persistent diarrhea. She describes it as watery around 10 -15 BM/d, no blood, even nighttime symptoms and incontinence sometimes. She is having tube feeds through surgically placed J tube and also getting most of her nutrition by TPN. She feels much better on days when tube feeds were stopped. Currently on 25 cc/hr, still having diarrhea. She has been on and off multiple antibiotics, but Cdiff testing has been negative. She is currently on Lomotil, opium tincture with no major benefit. She did have J tube even before for past 1 year but did not have any problem before.             Past Medical History:   Reviewed and edited as appropriate  Past Medical History:   Diagnosis Date     Atrial fibrillation (H)      Breast cancer (H) 2007    right side - lumpectomy, chemo, and local radiation     Chronic infection     infection/wound for two years after esoph surgery     Esophageal perforation      Fibromyalgia      GERD (gastroesophageal reflux disease)      Hiatal hernia      History of blood transfusion      IBS (irritable bowel syndrome)      Meniere's disease     deaf left ear     MS (multiple sclerosis) (H)      Noninfectious ileitis      Other chronic pain             Past Surgical History:   Reviewed and edited as appropriate   Past Surgical History:   Procedure Laterality Date     APPENDECTOMY       BACK SURGERY  5/1/2015    lumbar fusion     BRONCHOSCOPY FLEXIBLE N/A 4/17/2017    Procedure: BRONCHOSCOPY FLEXIBLE;;  Surgeon: Jens Wise MD;  Location: [facility]     C GASTROSTOMY/JEJUN TUBE      J tube for feedings     CLOSE SPIT FISTULA N/A 4/17/2017    Procedure:  CLOSE SPIT FISTULA;;  Surgeon: Jens Wise MD;  [facility]     COMBINED ESOPHAGOSCOPY, GASTROSCOPY, DUODENOSCOPY (EGD), REMOVE ESOPHAGEAL STENT N/A 8/26/2016    Procedure: COMBINED ESOPHAGOSCOPY, GASTROSCOPY, DUODENOSCOPY (EGD), REMOVE ESOPHAGEAL STENT;  Surgeon: Jens Wise MD;  [facility]     CREATE SPIT FISTULA N/A 1/9/2017    Procedure: CREATE SPIT FISTULA;  Surgeon: Jens Wise MD;  [facility]     ESOPHAGECTOMY N/A 1/9/2017    Procedure: ESOPHAGECTOMY;  Surgeon: Jens Wise MD;  [facility]     ESOPHAGOSCOPY, GASTROSCOPY, DUODENOSCOPY (EGD), COMBINED N/A 4/18/2016    Procedure: COMBINED ESOPHAGOSCOPY, GASTROSCOPY, DUODENOSCOPY (EGD);  Surgeon: Alexis Barraza MD;  [facility]     ESOPHAGOSCOPY, GASTROSCOPY, DUODENOSCOPY (EGD), COMBINED N/A 4/25/2016    Procedure: COMBINED ESOPHAGOSCOPY, GASTROSCOPY, DUODENOSCOPY (EGD);  Surgeon: Alexis Barraza MD;  [facility]     ESOPHAGOSCOPY, GASTROSCOPY, DUODENOSCOPY (EGD), COMBINED N/A 5/4/2016    Procedure: COMBINED ESOPHAGOSCOPY, GASTROSCOPY, DUODENOSCOPY (EGD);  Surgeon: Alexis Barraza MD;  [facility]     ESOPHAGOSCOPY, GASTROSCOPY, DUODENOSCOPY (EGD), COMBINED N/A 5/18/2016    Procedure: COMBINED ESOPHAGOSCOPY, GASTROSCOPY, DUODENOSCOPY (EGD);  Surgeon: Alexis Barraza MD;  [facility]     ESOPHAGOSCOPY, GASTROSCOPY, DUODENOSCOPY (EGD), COMBINED N/A 6/22/2016    Procedure: COMBINED ESOPHAGOSCOPY, GASTROSCOPY, DUODENOSCOPY (EGD);  Surgeon: Alexis Barraza MD;  [facility]     ESOPHAGOSCOPY, GASTROSCOPY, DUODENOSCOPY (EGD), COMBINED N/A 7/12/2016    Procedure: COMBINED ESOPHAGOSCOPY, GASTROSCOPY, DUODENOSCOPY (EGD);  Surgeon: Jens Wise MD;  [facility]     ESOPHAGOSCOPY, GASTROSCOPY, DUODENOSCOPY (EGD), COMBINED N/A 4/21/2017    Procedure: COMBINED ESOPHAGOSCOPY, GASTROSCOPY, DUODENOSCOPY (EGD);   Esophagogastroduodenoscopy, Pharyngostomy Tube Placement ;  Surgeon: Jens Wise MD;  [facility]     ESOPHAGOSCOPY, GASTROSCOPY, DUODENOSCOPY (EGD), COMBINED N/A 5/19/2017    Procedure: COMBINED ESOPHAGOSCOPY, GASTROSCOPY, DUODENOSCOPY (EGD);  Upper Endoscopy, Irrigation and Debridement of Chest Wound ;  Surgeon: Nalini Barreto MD;  [facility]     ESOPHAGOSCOPY, GASTROSCOPY, DUODENOSCOPY (EGD), COMBINED N/A 5/23/2017    Procedure: COMBINED ESOPHAGOSCOPY, GASTROSCOPY, DUODENOSCOPY (EGD);;  Surgeon: Nalini Barreto MD;  [facility]     ESOPHAGOSCOPY, GASTROSCOPY, DUODENOSCOPY (EGD), DILATATION, COMBINED N/A 6/29/2016    Procedure: COMBINED ESOPHAGOSCOPY, GASTROSCOPY, DUODENOSCOPY (EGD), DILATATION;  Surgeon: Jens Wise MD;  [facility]     EXPLORE NECK N/A 5/19/2017    Procedure: EXPLORE NECK;;  Surgeon: Nalini Barreto MD;  [facility]     HC UGI ENDOSCOPY W TRANSENDOSCOPIC STENT PLACEMENT N/A 7/12/2016    Procedure: COMBINED ESOPHAGOSCOPY, GASTROSCOPY, DUODENOSCOPY (EGD), PLACE TRANSENDOSCOPIC ESOPHAGEAL STENT;  Surgeon: Jens Wise MD;  [facility]     HC UGI ENDOSCOPY W TRANSENDOSCOPIC STENT PLACEMENT N/A 7/22/2016    Procedure: COMBINED ESOPHAGOSCOPY, GASTROSCOPY, DUODENOSCOPY (EGD), PLACE TRANSENDOSCOPIC ESOPHAGEAL STENT;  Surgeon: Jens Wise MD;  [facility]     INCISION AND DRAINAGE CHEST WASHOUT, COMBINED N/A 5/27/2017    Procedure: COMBINED INCISION AND DRAINAGE CHEST WASHOUT;  Chest washout;  Surgeon: Nalini Barreto MD;  [facility]     INCISION AND DRAINAGE CHEST WASHOUT, COMBINED N/A 5/28/2017    Procedure: COMBINED INCISION AND DRAINAGE CHEST WASHOUT;  Chest washout;  Surgeon: Nalini Barreto MD;  [facility]     INCISION AND DRAINAGE CHEST WASHOUT, COMBINED N/A 6/9/2017    Procedure: COMBINED INCISION AND DRAINAGE CHEST WASHOUT;  Chest washout and dressing change, Esophaogastroduodenoscopy;  Surgeon:  Jens Wise MD;  [facility]     IRRIGATION AND DEBRIDEMENT CHEST WASHOUT, COMBINED N/A 6/29/2016    Procedure: COMBINED IRRIGATION AND DEBRIDEMENT CHEST WASHOUT;  Surgeon: Jens Wise MD;  [facility]     IRRIGATION AND DEBRIDEMENT CHEST WASHOUT, COMBINED N/A 5/23/2017    Procedure: COMBINED IRRIGATION AND DEBRIDEMENT CHEST WASHOUT;  COMBINED IRRIGATION AND DEBRIDEMENT CHEST WASHOUT,COMBINED ESOPHAGOSCOPY, GASTROSCOPY, DUODENOSCOPY (EGD) ;  Surgeon: Nalini Barreto MD;  [facility]     IRRIGATION AND DEBRIDEMENT CHEST WASHOUT, COMBINED N/A 5/24/2017    Procedure: COMBINED IRRIGATION AND DEBRIDEMENT CHEST WASHOUT;  Chest wound Washout and Dressing Change;  Surgeon: Nalini Barreto MD;  [facility]     IRRIGATION AND DEBRIDEMENT CHEST WASHOUT, COMBINED N/A 5/25/2017    Procedure: COMBINED IRRIGATION AND DEBRIDEMENT CHEST WASHOUT;  Irrigation and Debridement Chest Washout and dressing change;  Surgeon: Nalini Barreto MD;  [facility]     IRRIGATION AND DEBRIDEMENT CHEST WASHOUT, COMBINED N/A 5/26/2017    Procedure: COMBINED IRRIGATION AND DEBRIDEMENT CHEST WASHOUT;  Irrigation and Debridement Chest Washout with  dressing change;  Surgeon: Nalini Barreto MD;  [facility]     IRRIGATION AND DEBRIDEMENT CHEST WASHOUT, COMBINED N/A 5/30/2017    Procedure: COMBINED IRRIGATION AND DEBRIDEMENT CHEST WASHOUT;  Irrigation and Debridement Chest Washout , PICC line dressing change;  Surgeon: Nalini Barreto MD;  [facility]     IRRIGATION AND DEBRIDEMENT CHEST WASHOUT, COMBINED N/A 5/31/2017    Procedure: COMBINED IRRIGATION AND DEBRIDEMENT CHEST WASHOUT;  Irrigation and Debridement Chest Washout ;  Surgeon: Nalini Barreto MD;  [facility]     IRRIGATION AND DEBRIDEMENT CHEST WASHOUT, COMBINED N/A 6/1/2017    Procedure: COMBINED IRRIGATION AND DEBRIDEMENT CHEST WASHOUT;  Chest Wound Irrigation And Debridement with dressing change ;  Surgeon: Nalini Barreto MD;   [facility]     IRRIGATION AND DEBRIDEMENT CHEST WASHOUT, COMBINED N/A 6/4/2017    Procedure: COMBINED IRRIGATION AND DEBRIDEMENT CHEST WASHOUT;  COMBINED IRRIGATION AND DEBRIDEMENT CHEST WASHOUT, Esophagoscopy, Gastroscopy, Duodenoscopy (EGD) place transendoscopic esophageal stent,   Pharyngostomy and chest wall tube exchange  C-Arm;  Surgeon: Jens Wise MD;  [facility]     IRRIGATION AND DEBRIDEMENT CHEST WASHOUT, COMBINED N/A 6/2/2017    Procedure: COMBINED IRRIGATION AND DEBRIDEMENT CHEST WASHOUT;  Chest Wound Irrigation and Debridement, Dressing Change;  Surgeon: Nalini Barreto MD;  [facility]     LAPAROSCOPIC ASSISTED INSERTION TUBE JEJUNOSTOMY N/A 4/17/2017    Procedure: LAPAROSCOPIC ASSISTED INSERTION TUBE JEJUNOSTOMY;;  Surgeon: Jens Wise MD;  [facility]     LAPAROSCOPY DIAGNOSTIC (GENERAL) N/A 1/9/2017    Procedure: LAPAROSCOPY DIAGNOSTIC (GENERAL);  Surgeon: Jens Wise MD;  [facility]     LAPAROSCOPY DIAGNOSTIC (GENERAL) N/A 4/17/2017    Procedure: LAPAROSCOPY DIAGNOSTIC (GENERAL);  Laparoscopic , Neck Dissection, Spit Fistula Takedown, Laparoscopic Jejunostomy Tube and Pharyngostomy Tube, Gastric Pull up, Upper Endoscopy(EGD)  , Flexible Bronchoscopy ,Sternotomy ;  Surgeon: Jens Wise MD;  [facility]     LUMPECTOMY BREAST Right 2007     NERVE BLOCK PERIPHERAL N/A 8/30/2016    Procedure: NERVE BLOCK PERIPHERAL;  Surgeon: GENERIC ANESTHESIA PROVIDER;  [facility]     NISSEN FUNDOPLICATION  1/2016     PHARYNGOSTOMY N/A 4/18/2016    Procedure: PHARYNGOSTOMY;  Surgeon: Alexis Barraza MD;  [facility]     PHARYNGOSTOMY N/A 4/25/2016    Procedure: PHARYNGOSTOMY;  Surgeon: Alexis Barraza MD;  [facility]     PHARYNGOSTOMY N/A 5/4/2016    Procedure: PHARYNGOSTOMY;  Surgeon: Alexis Barraza MD;  [facility]     PHARYNGOSTOMY N/A 6/22/2016    Procedure: PHARYNGOSTOMY;   "Surgeon: Alexis Barraza MD;  [facility]     PHARYNGOSTOMY N/A 6/29/2016    Procedure: PHARYNGOSTOMY;  Surgeon: Jens Wise MD;  [facility]     PHARYNGOSTOMY N/A 4/21/2017    Procedure: PHARYNGOSTOMY;;  Surgeon: Jens Wise MD;  [facility]     PHARYNGOSTOMY Left 5/31/2017    Procedure: PHARYNGOSTOMY;;  Surgeon: Nalini Barreto MD;  [facility]     PICC INSERTION Left 8/25/2016    5fr DL BioFlo PICC, 42cm (3cm external) in the L medial brachial vein w/ tip in the SVC RA junction.     PICC INSERTION - Rewire Right 05/31/2017    5fr DL BioFlo PICC, 40cm (6cm external) in the R medial brachial vein w/ tip in the SVC RA junction.     THORACOTOMY Right 1998    lung infection - \"hard crust formed on lung\"     THORACOTOMY Left 1/9/2017    Procedure: THORACOTOMY;  Surgeon: Jens Wise MD;  [facility]     WRIST SURGERY              Previous Endoscopy:   No results found for this or any previous visit.         Social History:   Reviewed and edited as appropriate  Social History     Social History     Marital status:      Spouse name: neeru     Number of children: 2     Years of education: N/A     Occupational History     retired       Social History Main Topics     Smoking status: Former Smoker     Packs/day: 1.00     Years: 15.00     Types: Cigarettes     Quit date: 1/1/1998     Smokeless tobacco: Not on file     Alcohol use No     Drug use: No     Sexual activity: Not on file     Other Topics Concern     Not on file     Social History Narrative    Retired realtor.  Lives with  Neeru. 2 children alive and well.            Family History:   Reviewed and edited as appropriate  Family History   Problem Relation Age of Onset     Alzheimer Disease Mother      CEREBROVASCULAR DISEASE Father      cerebral hemorrhage     Ovarian Cancer Sister      Breast Cancer Daughter            Allergies:   Reviewed and edited as " appropriate     Allergies   Allergen Reactions     Amoxicillin Diarrhea     Ativan [Lorazepam] Other (See Comments)     Hallucinations     Hydromorphone Itching     4/12/17 - patient open to using this as she tolerated Hydromorphone PCA during hospitalization in January 2017.      Morphine Itching            Medications:     Current Facility-Administered Medications   Medication     fiber modular (NUTRISOURCE FIBER) packet 2 packet     parenteral nutrition - ADULT compounded formula     lipids (INTRALIPID) 20 % infusion 250 mL     cholestyramine (QUESTRAN) Packet 4 g     gabapentin (NEURONTIN) solution 600 mg     gabapentin (NEURONTIN) solution 300 mg     opium tincture 10 MG/ML (1%) liquid 6 mg     simethicone (MYLICON) suspension 40 mg     parenteral nutrition - ADULT compounded formula     lidocaine (XYLOCAINE) 5 % ointment     fentaNYL (DURAGESIC) Patch in Place     fentaNYL (DURAGESIC) patch REMOVAL     fentaNYL (DURAGESIC) 100 mcg/hr 72 hr patch 1 patch     fentaNYL (DURAGESIC) 25 mcg/hr 72 hr patch 1 patch     oxyCODONE (ROXICODONE) solution 10-20 mg     acetaminophen (TYLENOL) solution 975 mg     Benzocaine (HURRICAINE/TOPEX) 20 % spray     saccharomyces boulardii (FLORASTOR) capsule 250 mg     enoxaparin (LOVENOX) injection 40 mg     bacitracin ointment     chlorhexidine (PERIDEX) 0.12 % solution 15 mL     chlorhexidine (HIBICLENS) 4 % liquid     lidocaine (LIDODERM) 5 % Patch 1 patch    And     lidocaine (LIDODERM) patch REMOVAL    And     lidocaine (LIDODERM) patch in PLACE     lidocaine 1 % 0.5-5 mL     lidocaine (LMX4) kit     sodium chloride (PF) 0.9% PF flush 5-50 mL     lidocaine 1 % 1 mL     lidocaine (LMX4) kit     sodium chloride (PF) 0.9% PF flush 10-20 mL     heparin lock flush 10 UNIT/ML injection 5-10 mL     heparin lock flush 10 UNIT/ML injection 5-10 mL     benzocaine-menthol (CHLORASEPTIC) 6-10 MG lozenge 1 lozenge     metoprolol (LOPRESSOR) suspension 25 mg     sodium chloride (PF) 0.9% PF  flush 10-20 mL     sodium chloride (PF) 0.9% PF flush 10 mL     potassium chloride SA (K-DUR/KLOR-CON M) CR tablet 20-40 mEq     potassium chloride (KLOR-CON) Packet 20-40 mEq     potassium chloride 10 mEq in 100 mL intermittent infusion     potassium chloride 10 mEq in 100 mL intermittent infusion with 10 mg lidocaine     potassium chloride 20 mEq in 50 mL intermittent infusion     magnesium sulfate 2 g in NS intermittent infusion (PharMED[facility] or FV Cmpd)     magnesium sulfate 4 g in 100 mL sterile water (premade)     potassium phosphate 15 mmol in D5W 250 mL intermittent infusion     potassium phosphate 20 mmol in D5W 500 mL intermittent infusion     potassium phosphate 20 mmol in D5W 250 mL intermittent infusion     potassium phosphate 25 mmol in D5W 500 mL intermittent infusion     sodium chloride (PF) 0.9% PF flush 3 mL     sodium chloride (PF) 0.9% PF flush 3 mL     traZODone (DESYREL) tablet 100 mg     diphenhydrAMINE (BENADRYL) capsule 25 mg     diphenoxylate-atropine (LOMOTIL) liquid 10 mL     loperamide (IMODIUM) liquid 4 mg     pantoprazole (PROTONIX) suspension 40 mg     prochlorperazine (COMPAZINE) tablet 5 mg    Or     prochlorperazine (COMPAZINE) injection 5 mg    Or     prochlorperazine (COMPAZINE) Suppository 12.5 mg     dextrose 10 % 1,000 mL infusion     glucose 40 % gel 15-30 g    Or     dextrose 50 % injection 25-50 mL    Or     glucagon injection 1 mg     artificial saliva (BIOTENE DRY MOUTHWASH) liquid 20 mL     ipratropium - albuterol 0.5 mg/2.5 mg/3 mL (DUONEB) neb solution 3 mL     labetalol (NORMODYNE/TRANDATE) injection 10-40 mg     albuterol (PROAIR HFA/PROVENTIL HFA/VENTOLIN HFA) Inhaler 4 puff     albuterol neb solution 2.5 mg     sodium chloride (PF) 0.9% PF flush 3 mL     sodium chloride (PF) 0.9% PF flush 3 mL     naloxone (NARCAN) injection 0.1-0.4 mg     ondansetron (ZOFRAN-ODT) ODT tab 4 mg    Or     ondansetron (ZOFRAN) injection 4 mg     ferrous sulfate 300 (60 FE) MG/5ML syrup  300 mg     Facility-Administered Medications Ordered in Other Encounters   Medication     bupivacaine 0.25 % - EPINEPHrine 1:200,000 injection             Review of Systems:   A complete review of systems was performed and is negative except as noted in the HPI           Physical Exam:   /58 (BP Location: Left arm)  Pulse 102  Temp 96.8  F (36  C) (Oral)  Resp 16  Ht 1.524 m (5')  Wt 38.8 kg (85 lb 9.6 oz)  SpO2 100%  Breastfeeding? No  BMI 16.72 kg/m2  Wt:   Wt Readings from Last 2 Encounters:   06/14/17 38.8 kg (85 lb 9.6 oz)   05/18/17 42.9 kg (94 lb 8 oz)   Constitutional: cooperative, no distress, emaciated.  Eyes: Sclera anicteric/ no pallor  Ears/nose/mouth/throat:  Pharyngostomy tube present. Normal oropharynx without ulcers or exudate, mucus membranes moist,  CV: Normal S1, S2, no murmurs.  Respiratory: clear to auscultation b/l, no murmur, mediastinal chest tube present.  Abd: Nondistended, +bs, nontender, no peritoneal signs, J tube present with no surrounding skin changes.   Skin: warm, perfused, no jaundice  Neuro: AAO x 3, no focal motor sensory deficits       Data:   Labs and imaging below were independently reviewed and interpreted    BMP    Recent Labs  Lab 06/16/17  0722 06/15/17  0726 06/14/17  0657 06/12/17  0704    135 135 137   POTASSIUM 3.8 3.8 3.8 4.0   CHLORIDE 104 103 104 104   ANNABELLE 8.6 8.7 8.8 9.2   CO2 23 24 24 23   BUN 19 26 31* 33*   CR 0.29* 0.26* 0.30* 0.35*   GLC 92 89 92 96     CBC    Recent Labs  Lab 06/15/17  0726 06/14/17  0657 06/12/17  0704 06/09/17  1707   WBC 10.7 10.9 13.2* 11.6*   RBC 3.06* 3.02* 3.08* 3.21*   HGB 8.9* 8.9* 8.9* 9.3*   HCT 29.0* 29.2* 29.8* 31.1*   MCV 95 97 97 97   MCH 29.1 29.5 28.9 29.0   MCHC 30.7* 30.5* 29.9* 29.9*   RDW 17.9* 18.2* 19.0* 19.6*   * 563* 589* 583*     INR    Recent Labs  Lab 06/12/17  0704   INR 1.06     LFTs    Recent Labs  Lab 06/15/17  0726 06/12/17  0704   ALKPHOS 423* 434*   AST 25 30   ALT 37 39    BILITOTAL 0.8 0.3   PROTTOTAL 7.8 8.2   ALBUMIN 2.8* 2.8*      PANCNo lab results found in last 7 days.

## 2017-06-17 LAB — ELASTASE PANC STL-MCNT: ABNORMAL UG/G

## 2017-06-17 PROCEDURE — A9270 NON-COVERED ITEM OR SERVICE: HCPCS | Mod: GY | Performed by: SURGERY

## 2017-06-17 PROCEDURE — 25000125 ZZHC RX 250: Performed by: SURGERY

## 2017-06-17 PROCEDURE — 27210429 ZZH NUTRITION PRODUCT INTERMEDIATE LITER

## 2017-06-17 PROCEDURE — A9270 NON-COVERED ITEM OR SERVICE: HCPCS | Mod: GY | Performed by: INTERNAL MEDICINE

## 2017-06-17 PROCEDURE — 25000132 ZZH RX MED GY IP 250 OP 250 PS 637: Mod: GY | Performed by: SURGERY

## 2017-06-17 PROCEDURE — A9270 NON-COVERED ITEM OR SERVICE: HCPCS | Mod: GY | Performed by: STUDENT IN AN ORGANIZED HEALTH CARE EDUCATION/TRAINING PROGRAM

## 2017-06-17 PROCEDURE — 25000128 H RX IP 250 OP 636: Performed by: STUDENT IN AN ORGANIZED HEALTH CARE EDUCATION/TRAINING PROGRAM

## 2017-06-17 PROCEDURE — 25000132 ZZH RX MED GY IP 250 OP 250 PS 637: Mod: GY | Performed by: INTERNAL MEDICINE

## 2017-06-17 PROCEDURE — 25000132 ZZH RX MED GY IP 250 OP 250 PS 637: Mod: GY | Performed by: STUDENT IN AN ORGANIZED HEALTH CARE EDUCATION/TRAINING PROGRAM

## 2017-06-17 PROCEDURE — 25000125 ZZHC RX 250: Performed by: PHYSICIAN ASSISTANT

## 2017-06-17 PROCEDURE — 25000125 ZZHC RX 250: Performed by: STUDENT IN AN ORGANIZED HEALTH CARE EDUCATION/TRAINING PROGRAM

## 2017-06-17 PROCEDURE — 40000558 ZZH STATISTIC CVC DRESSING CHANGE

## 2017-06-17 PROCEDURE — 25000132 ZZH RX MED GY IP 250 OP 250 PS 637: Mod: GY | Performed by: PHYSICIAN ASSISTANT

## 2017-06-17 PROCEDURE — A9270 NON-COVERED ITEM OR SERVICE: HCPCS | Mod: GY | Performed by: PHYSICIAN ASSISTANT

## 2017-06-17 PROCEDURE — 12000003 ZZH R&B CRITICAL UMMC

## 2017-06-17 RX ORDER — ACETAMINOPHEN 325 MG/1
975 TABLET ORAL 3 TIMES DAILY
Status: DISCONTINUED | OUTPATIENT
Start: 2017-06-17 | End: 2017-06-23

## 2017-06-17 RX ADMIN — OXYCODONE HYDROCHLORIDE 15 MG: 5 SOLUTION ORAL at 18:31

## 2017-06-17 RX ADMIN — OXYCODONE HYDROCHLORIDE 20 MG: 5 SOLUTION ORAL at 22:00

## 2017-06-17 RX ADMIN — PANTOPRAZOLE SODIUM 40 MG: 40 TABLET, DELAYED RELEASE ORAL at 09:10

## 2017-06-17 RX ADMIN — I.V. FAT EMULSION 250 ML: 20 EMULSION INTRAVENOUS at 20:13

## 2017-06-17 RX ADMIN — Medication 6 MG: at 11:48

## 2017-06-17 RX ADMIN — ACETAMINOPHEN 975 MG: 160 SUSPENSION ORAL at 00:32

## 2017-06-17 RX ADMIN — OXYCODONE HYDROCHLORIDE 15 MG: 5 SOLUTION ORAL at 11:48

## 2017-06-17 RX ADMIN — FENTANYL 1 PATCH: 25 PATCH, EXTENDED RELEASE TRANSDERMAL at 16:28

## 2017-06-17 RX ADMIN — SIMETHICONE 40 MG: 20 SUSPENSION/ DROPS ORAL at 09:10

## 2017-06-17 RX ADMIN — ACETAMINOPHEN 975 MG: 160 SUSPENSION ORAL at 08:47

## 2017-06-17 RX ADMIN — CHLORHEXIDINE GLUCONATE 15 ML: 1.2 RINSE ORAL at 20:17

## 2017-06-17 RX ADMIN — Medication 2 G: at 18:56

## 2017-06-17 RX ADMIN — LOPERAMIDE HYDROCHLORIDE 4 MG: 1 SOLUTION ORAL at 16:29

## 2017-06-17 RX ADMIN — Medication 2 PACKET: at 14:32

## 2017-06-17 RX ADMIN — CHLORHEXIDINE GLUCONATE 15 ML: 1.2 RINSE ORAL at 14:23

## 2017-06-17 RX ADMIN — ENOXAPARIN SODIUM 40 MG: 40 INJECTION SUBCUTANEOUS at 11:49

## 2017-06-17 RX ADMIN — Medication 2 PACKET: at 10:13

## 2017-06-17 RX ADMIN — CHOLESTYRAMINE 4 G: 4 POWDER, FOR SUSPENSION ORAL at 10:13

## 2017-06-17 RX ADMIN — BACITRACIN ZINC: 500 OINTMENT TOPICAL at 10:14

## 2017-06-17 RX ADMIN — CHOLESTYRAMINE 4 G: 4 POWDER, FOR SUSPENSION ORAL at 21:15

## 2017-06-17 RX ADMIN — GABAPENTIN 300 MG: 250 SOLUTION ORAL at 15:08

## 2017-06-17 RX ADMIN — SIMETHICONE 40 MG: 20 SUSPENSION/ DROPS ORAL at 11:49

## 2017-06-17 RX ADMIN — Medication 10 ML: at 16:29

## 2017-06-17 RX ADMIN — Medication 6 MG: at 16:29

## 2017-06-17 RX ADMIN — FENTANYL 1 PATCH: 100 PATCH, EXTENDED RELEASE TRANSDERMAL at 16:29

## 2017-06-17 RX ADMIN — MINERAL SUPPLEMENT IRON 300 MG / 5 ML STRENGTH LIQUID 100 PER BOX UNFLAVORED 300 MG: at 08:47

## 2017-06-17 RX ADMIN — LOPERAMIDE HYDROCHLORIDE 4 MG: 1 SOLUTION ORAL at 11:48

## 2017-06-17 RX ADMIN — GABAPENTIN 300 MG: 250 SOLUTION ORAL at 08:48

## 2017-06-17 RX ADMIN — METOPROLOL TARTRATE 25 MG: 100 TABLET ORAL at 20:17

## 2017-06-17 RX ADMIN — Medication 10 ML: at 11:48

## 2017-06-17 RX ADMIN — OXYCODONE HYDROCHLORIDE 20 MG: 5 SOLUTION ORAL at 03:27

## 2017-06-17 RX ADMIN — GABAPENTIN 600 MG: 250 SOLUTION ORAL at 21:16

## 2017-06-17 RX ADMIN — Medication 6 MG: at 21:15

## 2017-06-17 RX ADMIN — DIPHENHYDRAMINE HYDROCHLORIDE 25 MG: 25 CAPSULE ORAL at 22:00

## 2017-06-17 RX ADMIN — Medication 6 MG: at 08:48

## 2017-06-17 RX ADMIN — Medication 10 ML: at 08:48

## 2017-06-17 RX ADMIN — POTASSIUM CHLORIDE: 2 INJECTION, SOLUTION, CONCENTRATE INTRAVENOUS at 20:09

## 2017-06-17 RX ADMIN — OXYCODONE HYDROCHLORIDE 20 MG: 5 SOLUTION ORAL at 00:31

## 2017-06-17 RX ADMIN — Medication 250 MG: at 10:13

## 2017-06-17 RX ADMIN — OXYCODONE HYDROCHLORIDE 15 MG: 5 SOLUTION ORAL at 15:08

## 2017-06-17 RX ADMIN — Medication 250 MG: at 20:17

## 2017-06-17 RX ADMIN — BACITRACIN ZINC: 500 OINTMENT TOPICAL at 20:18

## 2017-06-17 RX ADMIN — SODIUM CHLORIDE, PRESERVATIVE FREE 5 ML: 5 INJECTION INTRAVENOUS at 16:31

## 2017-06-17 RX ADMIN — METOPROLOL TARTRATE 25 MG: 100 TABLET ORAL at 08:48

## 2017-06-17 RX ADMIN — ACETAMINOPHEN 975 MG: 325 TABLET, FILM COATED ORAL at 15:07

## 2017-06-17 RX ADMIN — LOPERAMIDE HYDROCHLORIDE 4 MG: 1 SOLUTION ORAL at 08:48

## 2017-06-17 RX ADMIN — LOPERAMIDE HYDROCHLORIDE 4 MG: 1 SOLUTION ORAL at 20:16

## 2017-06-17 RX ADMIN — CHLORHEXIDINE GLUCONATE 15 ML: 1.2 RINSE ORAL at 09:14

## 2017-06-17 RX ADMIN — OXYCODONE HYDROCHLORIDE 20 MG: 5 SOLUTION ORAL at 07:52

## 2017-06-17 RX ADMIN — TRAZODONE HYDROCHLORIDE 100 MG: 100 TABLET ORAL at 21:15

## 2017-06-17 RX ADMIN — SIMETHICONE 40 MG: 20 SUSPENSION/ DROPS ORAL at 16:31

## 2017-06-17 RX ADMIN — Medication 10 ML: at 20:16

## 2017-06-17 RX ADMIN — SIMETHICONE 40 MG: 20 SUSPENSION/ DROPS ORAL at 20:17

## 2017-06-17 NOTE — PLAN OF CARE
Problem: Individualization  Goal: Patient Preferences  Outcome: No Change  Vitals:     06/16/17 1629 06/16/17 1700 06/16/17 2026 06/17/17 0039   BP: 134/64   125/55 114/55   BP Location: Left arm   Left arm Left arm   Cuff Size:           Pulse:           Resp: 16 16 16   Temp: 96  F (35.6  C)   95.2  F (35.1  C) 96  F (35.6  C)   TempSrc: Oral   Oral Oral   SpO2: 100%   100% 100%   Weight:   39.5 kg (87 lb)       Height:           Patient refused tube feedings this shift.  Has had continued lse stool.  Pharyngostomy tube site with erythema, pharyngostomy tube to LIS, pulling about 200cc green drainage q shift, irrigated without issue.  Chest dressings CDI.  Pain controlled with oxycodone and scheduled tylenol, fentanyl patches.  Continues on TPN/lipids.  NPO.  Continue with POC.

## 2017-06-17 NOTE — PROGRESS NOTES
Focus:  Status  Data:     Vs stable afebrile.  Pt is up ad renea in hallway is steady when up.  Pt did not take any of her questran this ryan nor did she take any nutrisource fiber.  Pt reports that she will start taking those things tomorrow.  Pt also did not keep tube feedings running this ryan, pt is constantly on the commode with loose liquidy stools.  Pharyngostomy tube with 200cc output of dark green drainage., Will continue to monitor notify md with problems

## 2017-06-17 NOTE — PROGRESS NOTES
Thoracic surgery progress note    No acute events overnight. Pain controlled. Feels like her loose stools are a little thicker    Temp: 96.8  F (36  C) Temp  Min: 95.2  F (35.1  C)  Max: 96.8  F (36  C)  Resp: 16 Resp  Min: 16  Max: 16  SpO2: 97 % SpO2  Min: 97 %  Max: 100 %    No Data Recorded  Heart Rate: 82 Heart Rate  Min: 72  Max: 82  BP: 134/63 Systolic (24hrs), Av , Min:114 , Max:134   Diastolic (24hrs), Av, Min:55, Max:64    A&O, NAD  Pharyngostomy tube to LIS w/ green output in canister  Chest wound with green staining on the packing and good granulation tissue base  Unlabored breathing on RA  RRR  Abd soft, non tender, non distended    I&O  Pharyngostomy 350/200  Mixed urine/stool 2300/200    A/P: 72F w/ chronic esophageal perforation s/p Nissen at OSH, now s/p esophagectomy with gastric pull-up c/b recurrent anastomotic leak and mediastinal abscess s/p serial washouts, pharyngostomy tube and most recently EGD w/ stent placement on .    - C/w Fentanyl patch (now at 125/hr) and Oxy PRN. Appreciate palliative recs.  - Pharyngostomy to LIS.   - Strict NPO. C/w TPN and tube feeds @ goal 6 hours overnight, minimize tube feeding interruptions   - Chronic diarrhea - max doses of anti-motility agents. Cholestyramine. Fiber TID.   - Daily dressing changes  - Deja    Seen w/ fellow, Dr Monica Liu MD  Surgery PGY1  [id number]

## 2017-06-18 LAB
MAGNESIUM SERPL-MCNC: 1.8 MG/DL (ref 1.6–2.3)
PLATELET # BLD AUTO: 496 10E9/L (ref 150–450)
POTASSIUM SERPL-SCNC: 3.8 MMOL/L (ref 3.4–5.3)

## 2017-06-18 PROCEDURE — 27210429 ZZH NUTRITION PRODUCT INTERMEDIATE LITER

## 2017-06-18 PROCEDURE — 25000132 ZZH RX MED GY IP 250 OP 250 PS 637: Mod: GY | Performed by: INTERNAL MEDICINE

## 2017-06-18 PROCEDURE — A9270 NON-COVERED ITEM OR SERVICE: HCPCS | Mod: GY | Performed by: STUDENT IN AN ORGANIZED HEALTH CARE EDUCATION/TRAINING PROGRAM

## 2017-06-18 PROCEDURE — 84132 ASSAY OF SERUM POTASSIUM: CPT | Performed by: THORACIC SURGERY (CARDIOTHORACIC VASCULAR SURGERY)

## 2017-06-18 PROCEDURE — 25000125 ZZHC RX 250: Performed by: SURGERY

## 2017-06-18 PROCEDURE — 25000132 ZZH RX MED GY IP 250 OP 250 PS 637: Mod: GY | Performed by: STUDENT IN AN ORGANIZED HEALTH CARE EDUCATION/TRAINING PROGRAM

## 2017-06-18 PROCEDURE — A9270 NON-COVERED ITEM OR SERVICE: HCPCS | Mod: GY | Performed by: SURGERY

## 2017-06-18 PROCEDURE — 83735 ASSAY OF MAGNESIUM: CPT | Performed by: THORACIC SURGERY (CARDIOTHORACIC VASCULAR SURGERY)

## 2017-06-18 PROCEDURE — 25000125 ZZHC RX 250: Performed by: STUDENT IN AN ORGANIZED HEALTH CARE EDUCATION/TRAINING PROGRAM

## 2017-06-18 PROCEDURE — A9270 NON-COVERED ITEM OR SERVICE: HCPCS | Mod: GY | Performed by: PHYSICIAN ASSISTANT

## 2017-06-18 PROCEDURE — 85049 AUTOMATED PLATELET COUNT: CPT | Performed by: THORACIC SURGERY (CARDIOTHORACIC VASCULAR SURGERY)

## 2017-06-18 PROCEDURE — 25000132 ZZH RX MED GY IP 250 OP 250 PS 637: Mod: GY | Performed by: PHYSICIAN ASSISTANT

## 2017-06-18 PROCEDURE — 40000558 ZZH STATISTIC CVC DRESSING CHANGE

## 2017-06-18 PROCEDURE — A9270 NON-COVERED ITEM OR SERVICE: HCPCS | Mod: GY | Performed by: INTERNAL MEDICINE

## 2017-06-18 PROCEDURE — 12000003 ZZH R&B CRITICAL UMMC

## 2017-06-18 PROCEDURE — 25000132 ZZH RX MED GY IP 250 OP 250 PS 637: Mod: GY | Performed by: SURGERY

## 2017-06-18 PROCEDURE — 25000125 ZZHC RX 250: Performed by: PHYSICIAN ASSISTANT

## 2017-06-18 PROCEDURE — 25000128 H RX IP 250 OP 636: Performed by: STUDENT IN AN ORGANIZED HEALTH CARE EDUCATION/TRAINING PROGRAM

## 2017-06-18 PROCEDURE — 36592 COLLECT BLOOD FROM PICC: CPT | Performed by: THORACIC SURGERY (CARDIOTHORACIC VASCULAR SURGERY)

## 2017-06-18 RX ADMIN — LOPERAMIDE HYDROCHLORIDE 4 MG: 1 SOLUTION ORAL at 12:34

## 2017-06-18 RX ADMIN — CHOLESTYRAMINE 4 G: 4 POWDER, FOR SUSPENSION ORAL at 21:36

## 2017-06-18 RX ADMIN — SIMETHICONE 40 MG: 20 SUSPENSION/ DROPS ORAL at 12:34

## 2017-06-18 RX ADMIN — Medication 250 MG: at 10:05

## 2017-06-18 RX ADMIN — Medication 6 MG: at 08:02

## 2017-06-18 RX ADMIN — SIMETHICONE 40 MG: 20 SUSPENSION/ DROPS ORAL at 08:04

## 2017-06-18 RX ADMIN — GABAPENTIN 300 MG: 250 SOLUTION ORAL at 16:11

## 2017-06-18 RX ADMIN — OXYCODONE HYDROCHLORIDE 20 MG: 5 SOLUTION ORAL at 15:55

## 2017-06-18 RX ADMIN — OXYCODONE HYDROCHLORIDE 15 MG: 5 SOLUTION ORAL at 12:33

## 2017-06-18 RX ADMIN — OXYCODONE HYDROCHLORIDE 15 MG: 5 SOLUTION ORAL at 22:34

## 2017-06-18 RX ADMIN — ACETAMINOPHEN 975 MG: 325 TABLET, FILM COATED ORAL at 10:05

## 2017-06-18 RX ADMIN — Medication 2 PACKET: at 10:06

## 2017-06-18 RX ADMIN — LOPERAMIDE HYDROCHLORIDE 4 MG: 1 SOLUTION ORAL at 20:01

## 2017-06-18 RX ADMIN — METOPROLOL TARTRATE 25 MG: 100 TABLET ORAL at 20:01

## 2017-06-18 RX ADMIN — METOPROLOL TARTRATE 25 MG: 100 TABLET ORAL at 08:02

## 2017-06-18 RX ADMIN — PANTOPRAZOLE SODIUM 40 MG: 40 TABLET, DELAYED RELEASE ORAL at 15:00

## 2017-06-18 RX ADMIN — I.V. FAT EMULSION: 20 EMULSION INTRAVENOUS at 19:50

## 2017-06-18 RX ADMIN — GABAPENTIN 300 MG: 250 SOLUTION ORAL at 10:05

## 2017-06-18 RX ADMIN — BACITRACIN ZINC: 500 OINTMENT TOPICAL at 09:20

## 2017-06-18 RX ADMIN — CHOLESTYRAMINE 4 G: 4 POWDER, FOR SUSPENSION ORAL at 10:05

## 2017-06-18 RX ADMIN — POTASSIUM CHLORIDE: 2 INJECTION, SOLUTION, CONCENTRATE INTRAVENOUS at 19:50

## 2017-06-18 RX ADMIN — Medication 6 MG: at 12:34

## 2017-06-18 RX ADMIN — ACETAMINOPHEN 975 MG: 325 TABLET, FILM COATED ORAL at 17:16

## 2017-06-18 RX ADMIN — OXYCODONE HYDROCHLORIDE 15 MG: 5 SOLUTION ORAL at 02:42

## 2017-06-18 RX ADMIN — ACETAMINOPHEN 975 MG: 325 TABLET, FILM COATED ORAL at 00:14

## 2017-06-18 RX ADMIN — OXYCODONE HYDROCHLORIDE 15 MG: 5 SOLUTION ORAL at 06:55

## 2017-06-18 RX ADMIN — Medication 10 ML: at 20:01

## 2017-06-18 RX ADMIN — CHLORHEXIDINE GLUCONATE 15 ML: 1.2 RINSE ORAL at 15:00

## 2017-06-18 RX ADMIN — OXYCODONE HYDROCHLORIDE 15 MG: 5 SOLUTION ORAL at 19:22

## 2017-06-18 RX ADMIN — SIMETHICONE: 20 SUSPENSION/ DROPS ORAL at 16:12

## 2017-06-18 RX ADMIN — Medication 6 MG: at 16:12

## 2017-06-18 RX ADMIN — TRAZODONE HYDROCHLORIDE 100 MG: 100 TABLET ORAL at 21:36

## 2017-06-18 RX ADMIN — BACITRACIN ZINC: 500 OINTMENT TOPICAL at 20:19

## 2017-06-18 RX ADMIN — Medication 10 ML: at 16:12

## 2017-06-18 RX ADMIN — Medication: at 10:02

## 2017-06-18 RX ADMIN — Medication 10 ML: at 12:34

## 2017-06-18 RX ADMIN — GABAPENTIN 600 MG: 250 SOLUTION ORAL at 21:36

## 2017-06-18 RX ADMIN — Medication 2 PACKET: at 17:17

## 2017-06-18 RX ADMIN — SODIUM CHLORIDE, PRESERVATIVE FREE 5 ML: 5 INJECTION INTRAVENOUS at 07:27

## 2017-06-18 RX ADMIN — MINERAL SUPPLEMENT IRON 300 MG / 5 ML STRENGTH LIQUID 100 PER BOX UNFLAVORED 300 MG: at 10:04

## 2017-06-18 RX ADMIN — SODIUM CHLORIDE, PRESERVATIVE FREE 5 ML: 5 INJECTION INTRAVENOUS at 19:22

## 2017-06-18 RX ADMIN — ENOXAPARIN SODIUM 40 MG: 40 INJECTION SUBCUTANEOUS at 12:33

## 2017-06-18 RX ADMIN — Medication 10 ML: at 08:01

## 2017-06-18 RX ADMIN — Medication 250 MG: at 20:01

## 2017-06-18 RX ADMIN — CHLORHEXIDINE GLUCONATE 15 ML: 1.2 RINSE ORAL at 10:03

## 2017-06-18 RX ADMIN — LOPERAMIDE HYDROCHLORIDE 4 MG: 1 SOLUTION ORAL at 08:02

## 2017-06-18 RX ADMIN — SIMETHICONE 40 MG: 20 SUSPENSION/ DROPS ORAL at 20:02

## 2017-06-18 RX ADMIN — Medication 6 MG: at 20:01

## 2017-06-18 RX ADMIN — Medication 2 PACKET: at 00:15

## 2017-06-18 RX ADMIN — DIPHENHYDRAMINE HYDROCHLORIDE 25 MG: 25 CAPSULE ORAL at 22:34

## 2017-06-18 RX ADMIN — OXYCODONE HYDROCHLORIDE 15 MG: 5 SOLUTION ORAL at 09:20

## 2017-06-18 RX ADMIN — CHLORHEXIDINE GLUCONATE 15 ML: 1.2 RINSE ORAL at 20:02

## 2017-06-18 RX ADMIN — LOPERAMIDE HYDROCHLORIDE 4 MG: 1 SOLUTION ORAL at 16:11

## 2017-06-18 NOTE — PLAN OF CARE
Problem: Goal Outcome Summary  Goal: Goal Outcome Summary  Outcome: No Change  VSS, afebrile. Pain controlled. Independent in room. NPO with tube feed titrated by patient and TPN continuous. Up to commode frequently with watery diarrhea. Antidiarrheal medications given as scheduled with no improvement today. Liquid Tylenol changed to tablet and crushed to put down J tube as solution is known to cause diarrhea. Pharyngostomy to LIS output >800 over 12 hrs. Dressing to chest wound changed by MD. C/d/i. Will continue with POC.

## 2017-06-18 NOTE — PLAN OF CARE
Problem: Goal Outcome Summary  Goal: Goal Outcome Summary  Outcome: No Change  Vitals:     06/17/17 0039 06/17/17 0734 06/17/17 1726 06/17/17 2200   BP: 114/55 134/63 117/53 110/52   BP Location: Left arm Left arm Left arm Left arm   Cuff Size:           Pulse:           Resp: 16 16 20 20   Temp: 96  F (35.6  C) 96.8  F (36  C) 96.4  F (35.8  C) 96.7  F (35.9  C)   TempSrc: Oral Oral Oral Oral   SpO2: 100% 97% 96% 100%   Weight:     39.3 kg (86 lb 11.2 oz)     Height:           From [id number]:  Patient continues to have frequent lse stools even with multi treatments.  Pharyngostomy tube insertion site with erythema-cleaned and bacitracin applied; to LIS pulling green drainage.  Adequate pain control with oxycodone, fentanyl patch ,tylenol, neurontin.  Tube feeding at 25cc/hr until 2300 then off for night.  Has had good UV.  Patient complained of fullness with multi meds at same time-rescheduled to spread out time and volume.  On TPN/lipids.  Sleeping better this shift.   Continue with POC.

## 2017-06-18 NOTE — PROGRESS NOTES
Thoracic surgery progress note    No acute events overnight    Temp: 97.4  F (36.3  C) Temp  Min: 96.4  F (35.8  C)  Max: 97.4  F (36.3  C)  Resp: 20 Resp  Min: 20  Max: 20  SpO2: 99 % SpO2  Min: 96 %  Max: 100 %    No Data Recorded  Heart Rate: 72 Heart Rate  Min: 72  Max: 87  BP: 120/59 Systolic (24hrs), Av , Min:110 , Max:120   Diastolic (24hrs), Av, Min:52, Max:59    A&O, NAD  Pharyngostomy tube to LIS w/ green output in canister  Chest wound with green staining on the packing and good granulation tissue base  Unlabored breathing on RA  RRR  Abd soft, non tender, non distended     I&O  Pharyngostomy 1100  Mixed urine/stool 650     A/P: 72F w/ chronic esophageal perforation s/p Nissen at OSH, now s/p esophagectomy with gastric pull-up c/b recurrent anastomotic leak and mediastinal abscess s/p serial washouts, pharyngostomy tube and most recently EGD w/ stent placement on .     - C/w Fentanyl patch (now at 125/hr) and Oxy PRN. Appreciate palliative recs.  - Pharyngostomy to LIS.   - Strict NPO. C/w TPN and tube feeds @ goal 6 hours overnight, minimize tube feeding interruptions   - Chronic diarrhea - max doses of anti-motility agents. Cholestyramine. Fiber TID.   - Daily dressing changes  - Lovenox     Discussed w/ fellow, Dr Chinyere Liu MD  Surgery PGY1  [id number]

## 2017-06-19 LAB
ALBUMIN SERPL-MCNC: 2.6 G/DL (ref 3.4–5)
ALP SERPL-CCNC: 361 U/L (ref 40–150)
ALT SERPL W P-5'-P-CCNC: 25 U/L (ref 0–50)
ANION GAP SERPL CALCULATED.3IONS-SCNC: 6 MMOL/L (ref 3–14)
AST SERPL W P-5'-P-CCNC: 24 U/L (ref 0–45)
BILIRUB SERPL-MCNC: 0.5 MG/DL (ref 0.2–1.3)
BUN SERPL-MCNC: 18 MG/DL (ref 7–30)
CALCIUM SERPL-MCNC: 8.6 MG/DL (ref 8.5–10.1)
CHLORIDE SERPL-SCNC: 104 MMOL/L (ref 94–109)
CO2 SERPL-SCNC: 26 MMOL/L (ref 20–32)
CREAT SERPL-MCNC: 0.31 MG/DL (ref 0.52–1.04)
GFR SERPL CREATININE-BSD FRML MDRD: ABNORMAL ML/MIN/1.7M2
GLUCOSE SERPL-MCNC: 88 MG/DL (ref 70–99)
INR PPP: 1.08 (ref 0.86–1.14)
MAGNESIUM SERPL-MCNC: 1.8 MG/DL (ref 1.6–2.3)
PHOSPHATE SERPL-MCNC: 3.7 MG/DL (ref 2.5–4.5)
POTASSIUM SERPL-SCNC: 3.9 MMOL/L (ref 3.4–5.3)
PREALB SERPL IA-MCNC: 20 MG/DL (ref 15–45)
PROT SERPL-MCNC: 7.6 G/DL (ref 6.8–8.8)
SODIUM SERPL-SCNC: 136 MMOL/L (ref 133–144)
TRIGL SERPL-MCNC: 84 MG/DL

## 2017-06-19 PROCEDURE — 25000125 ZZHC RX 250: Performed by: STUDENT IN AN ORGANIZED HEALTH CARE EDUCATION/TRAINING PROGRAM

## 2017-06-19 PROCEDURE — A9270 NON-COVERED ITEM OR SERVICE: HCPCS | Mod: GY | Performed by: INTERNAL MEDICINE

## 2017-06-19 PROCEDURE — A9270 NON-COVERED ITEM OR SERVICE: HCPCS | Mod: GY | Performed by: SURGERY

## 2017-06-19 PROCEDURE — 25000125 ZZHC RX 250: Performed by: PHYSICIAN ASSISTANT

## 2017-06-19 PROCEDURE — 25000132 ZZH RX MED GY IP 250 OP 250 PS 637: Mod: GY | Performed by: PHYSICIAN ASSISTANT

## 2017-06-19 PROCEDURE — 84134 ASSAY OF PREALBUMIN: CPT | Performed by: STUDENT IN AN ORGANIZED HEALTH CARE EDUCATION/TRAINING PROGRAM

## 2017-06-19 PROCEDURE — A9270 NON-COVERED ITEM OR SERVICE: HCPCS | Mod: GY | Performed by: STUDENT IN AN ORGANIZED HEALTH CARE EDUCATION/TRAINING PROGRAM

## 2017-06-19 PROCEDURE — 83735 ASSAY OF MAGNESIUM: CPT | Performed by: STUDENT IN AN ORGANIZED HEALTH CARE EDUCATION/TRAINING PROGRAM

## 2017-06-19 PROCEDURE — 25000125 ZZHC RX 250: Performed by: SURGERY

## 2017-06-19 PROCEDURE — 80053 COMPREHEN METABOLIC PANEL: CPT | Performed by: STUDENT IN AN ORGANIZED HEALTH CARE EDUCATION/TRAINING PROGRAM

## 2017-06-19 PROCEDURE — 12000003 ZZH R&B CRITICAL UMMC

## 2017-06-19 PROCEDURE — 25000132 ZZH RX MED GY IP 250 OP 250 PS 637: Mod: GY | Performed by: STUDENT IN AN ORGANIZED HEALTH CARE EDUCATION/TRAINING PROGRAM

## 2017-06-19 PROCEDURE — 25000128 H RX IP 250 OP 636: Performed by: STUDENT IN AN ORGANIZED HEALTH CARE EDUCATION/TRAINING PROGRAM

## 2017-06-19 PROCEDURE — 25000132 ZZH RX MED GY IP 250 OP 250 PS 637: Mod: GY | Performed by: INTERNAL MEDICINE

## 2017-06-19 PROCEDURE — 40000802 ZZH SITE CHECK

## 2017-06-19 PROCEDURE — 27210429 ZZH NUTRITION PRODUCT INTERMEDIATE LITER

## 2017-06-19 PROCEDURE — 84478 ASSAY OF TRIGLYCERIDES: CPT | Performed by: STUDENT IN AN ORGANIZED HEALTH CARE EDUCATION/TRAINING PROGRAM

## 2017-06-19 PROCEDURE — 36592 COLLECT BLOOD FROM PICC: CPT | Performed by: STUDENT IN AN ORGANIZED HEALTH CARE EDUCATION/TRAINING PROGRAM

## 2017-06-19 PROCEDURE — 84100 ASSAY OF PHOSPHORUS: CPT | Performed by: STUDENT IN AN ORGANIZED HEALTH CARE EDUCATION/TRAINING PROGRAM

## 2017-06-19 PROCEDURE — 85610 PROTHROMBIN TIME: CPT | Performed by: STUDENT IN AN ORGANIZED HEALTH CARE EDUCATION/TRAINING PROGRAM

## 2017-06-19 PROCEDURE — A9270 NON-COVERED ITEM OR SERVICE: HCPCS | Mod: GY | Performed by: PHYSICIAN ASSISTANT

## 2017-06-19 PROCEDURE — 25000132 ZZH RX MED GY IP 250 OP 250 PS 637: Mod: GY | Performed by: SURGERY

## 2017-06-19 RX ADMIN — GABAPENTIN 300 MG: 250 SOLUTION ORAL at 16:07

## 2017-06-19 RX ADMIN — SIMETHICONE 40 MG: 20 SUSPENSION/ DROPS ORAL at 08:02

## 2017-06-19 RX ADMIN — Medication 2 G: at 10:47

## 2017-06-19 RX ADMIN — GABAPENTIN 300 MG: 250 SOLUTION ORAL at 07:58

## 2017-06-19 RX ADMIN — Medication 10 ML: at 07:59

## 2017-06-19 RX ADMIN — Medication 6 MG: at 16:07

## 2017-06-19 RX ADMIN — I.V. FAT EMULSION 180 ML: 20 EMULSION INTRAVENOUS at 20:13

## 2017-06-19 RX ADMIN — BACITRACIN ZINC: 500 OINTMENT TOPICAL at 20:51

## 2017-06-19 RX ADMIN — Medication 2 PACKET: at 09:02

## 2017-06-19 RX ADMIN — CHLORHEXIDINE GLUCONATE 15 ML: 1.2 RINSE ORAL at 13:48

## 2017-06-19 RX ADMIN — Medication: at 20:56

## 2017-06-19 RX ADMIN — OXYCODONE HYDROCHLORIDE 15 MG: 5 SOLUTION ORAL at 13:48

## 2017-06-19 RX ADMIN — CHOLESTYRAMINE 4 G: 4 POWDER, FOR SUSPENSION ORAL at 10:35

## 2017-06-19 RX ADMIN — METOPROLOL TARTRATE 25 MG: 100 TABLET ORAL at 20:18

## 2017-06-19 RX ADMIN — LOPERAMIDE HYDROCHLORIDE 4 MG: 1 SOLUTION ORAL at 12:09

## 2017-06-19 RX ADMIN — CHOLESTYRAMINE 4 G: 4 POWDER, FOR SUSPENSION ORAL at 22:10

## 2017-06-19 RX ADMIN — LOPERAMIDE HYDROCHLORIDE 4 MG: 1 SOLUTION ORAL at 07:59

## 2017-06-19 RX ADMIN — DIPHENHYDRAMINE HYDROCHLORIDE 25 MG: 25 CAPSULE ORAL at 22:45

## 2017-06-19 RX ADMIN — BACITRACIN ZINC: 500 OINTMENT TOPICAL at 08:00

## 2017-06-19 RX ADMIN — SIMETHICONE 40 MG: 20 SUSPENSION/ DROPS ORAL at 12:06

## 2017-06-19 RX ADMIN — PANTOPRAZOLE SODIUM 40 MG: 40 TABLET, DELAYED RELEASE ORAL at 13:48

## 2017-06-19 RX ADMIN — Medication 10 ML: at 16:07

## 2017-06-19 RX ADMIN — SIMETHICONE 40 MG: 20 SUSPENSION/ DROPS ORAL at 20:15

## 2017-06-19 RX ADMIN — OXYCODONE HYDROCHLORIDE 15 MG: 5 SOLUTION ORAL at 10:34

## 2017-06-19 RX ADMIN — GABAPENTIN 600 MG: 250 SOLUTION ORAL at 21:27

## 2017-06-19 RX ADMIN — ACETAMINOPHEN 975 MG: 325 TABLET, FILM COATED ORAL at 16:08

## 2017-06-19 RX ADMIN — OXYCODONE HYDROCHLORIDE 20 MG: 5 SOLUTION ORAL at 20:13

## 2017-06-19 RX ADMIN — SODIUM CHLORIDE, PRESERVATIVE FREE 5 ML: 5 INJECTION INTRAVENOUS at 16:09

## 2017-06-19 RX ADMIN — LOPERAMIDE HYDROCHLORIDE 4 MG: 1 SOLUTION ORAL at 16:07

## 2017-06-19 RX ADMIN — OXYCODONE HYDROCHLORIDE 15 MG: 5 SOLUTION ORAL at 06:38

## 2017-06-19 RX ADMIN — Medication 2 PACKET: at 00:19

## 2017-06-19 RX ADMIN — Medication 10 ML: at 20:14

## 2017-06-19 RX ADMIN — MINERAL SUPPLEMENT IRON 300 MG / 5 ML STRENGTH LIQUID 100 PER BOX UNFLAVORED 300 MG: at 07:59

## 2017-06-19 RX ADMIN — Medication 6 MG: at 20:15

## 2017-06-19 RX ADMIN — SIMETHICONE 40 MG: 20 SUSPENSION/ DROPS ORAL at 16:07

## 2017-06-19 RX ADMIN — OXYCODONE HYDROCHLORIDE 20 MG: 5 SOLUTION ORAL at 16:51

## 2017-06-19 RX ADMIN — POTASSIUM CHLORIDE: 2 INJECTION, SOLUTION, CONCENTRATE INTRAVENOUS at 20:12

## 2017-06-19 RX ADMIN — ENOXAPARIN SODIUM 40 MG: 40 INJECTION SUBCUTANEOUS at 12:07

## 2017-06-19 RX ADMIN — Medication 6 MG: at 07:58

## 2017-06-19 RX ADMIN — OXYCODONE HYDROCHLORIDE 15 MG: 5 SOLUTION ORAL at 02:54

## 2017-06-19 RX ADMIN — Medication 10 ML: at 12:07

## 2017-06-19 RX ADMIN — CHLORHEXIDINE GLUCONATE 15 ML: 1.2 RINSE ORAL at 20:51

## 2017-06-19 RX ADMIN — CHLORHEXIDINE GLUCONATE 15 ML: 1.2 RINSE ORAL at 07:59

## 2017-06-19 RX ADMIN — TRAZODONE HYDROCHLORIDE 100 MG: 100 TABLET ORAL at 21:27

## 2017-06-19 RX ADMIN — OXYCODONE HYDROCHLORIDE 15 MG: 5 SOLUTION ORAL at 23:23

## 2017-06-19 RX ADMIN — Medication 2 PACKET: at 18:36

## 2017-06-19 RX ADMIN — Medication 250 MG: at 08:00

## 2017-06-19 RX ADMIN — ACETAMINOPHEN 975 MG: 325 TABLET, FILM COATED ORAL at 07:58

## 2017-06-19 RX ADMIN — ACETAMINOPHEN 975 MG: 325 TABLET, FILM COATED ORAL at 00:19

## 2017-06-19 RX ADMIN — OXYCODONE HYDROCHLORIDE 5 MG: 5 SOLUTION ORAL at 07:59

## 2017-06-19 RX ADMIN — SODIUM CHLORIDE, PRESERVATIVE FREE 5 ML: 5 INJECTION INTRAVENOUS at 06:45

## 2017-06-19 RX ADMIN — LOPERAMIDE HYDROCHLORIDE 4 MG: 1 SOLUTION ORAL at 20:15

## 2017-06-19 RX ADMIN — Medication 6 MG: at 12:07

## 2017-06-19 RX ADMIN — Medication 250 MG: at 20:16

## 2017-06-19 NOTE — OP NOTE
DATE OF PROCEDURE:  2017      PREOPERATIVE DIAGNOSIS:  Esophageal leak status post esophagectomy.      POSTOPERATIVE DIAGNOSIS:  Esophageal leak status post esophagectomy.       SURGEON:  Conchis Marrero MD.      ASSISTANT SURGEON:  Kd Liu MD.        DESCRIPTION OF PROCEDURE:  After obtaining informed consent, Favian Santoro was brought to the operating room and laid supine on the operating table.  After induction of general anesthesia, introduction of endotracheal tube, a pediatric endoscope was passed per orally into the esophagus.  At the anastomosis, however, there was free communication between the mediastinal space and the GI tract, where the scope very easily passed into the mediastinal space.  After significant maneuvering, we were able to get the scope down the GI tract and into the stomach.  A 260 Super Stiff Amplatz wire was passed through the scope into the bowel using fluoroscopic guidance.  After this, an 18-Romanian pharyngostomy tube was passed over the wire to lie in the conduit.  An 18-Romanian NG tube was used to make a pharyngostomy tract. the posterior pharyngeal pillar was visualized and the tract was made just posterior to this to emerge below the left mastoid.  The pharyngostomy tube was then guided into the tract using the 18-Romanian NG tube.  This was secured to the skin and dressings were applied.  We then turned our attention to washing out the chest wound.  The old packing was removed.  The chest was then washed out adequately with warm saline and was repacked using Kerlix gauze.  The patient tolerated the procedure well.            CONCHIS MARRERO MD             D: 2017 17:25   T: 2017 19:15   MT: TD      Name:     FAVIAN MALONE   MRN:      [id number]        Account:        [id number]   :      1945           Procedure Date: 2017      Document: [id number]

## 2017-06-19 NOTE — PROGRESS NOTES
Recommendations, Patient/Family Counseling & Coordination      Pain:    I stopped by briefly to check on Minerva. She was on the commode and had some abdominal cramps some pain by her pharyngostomy tube. She remains on the fentanyl patch 125 mcg/hr and oral oxycodone prn. Over the weekend, some of her medicines were rearranged as to not have her woken up in the middle of the night with hopes of better sleep. She continues to wake up for pain meds.  She remains on gabapentin 300/300/600 and oxycodone 15-20 mg Q 3 hours prn taking about 7 doses every 24 hours (about 100 - 125 mg oxycodone/day).     Minerva continues to be frustrated about her prolonged hospital course and is getting additional support from our palliative .     We will continue to follow her with you and make recommendations for pain control. Would leave her current doses as they are today and we will reassess tomorrow.     The patient was discussed with Dr. Lorenzo Ramirez MD  Palliative medicine fellow  Beeper [phone]

## 2017-06-19 NOTE — PROGRESS NOTES
"Palliative Care Inpatient Clinical Social Work Visit    Patient Information:  Minerva is a 72 year old woman with chronic esophageal perforation and recurrent anastomotic leak and mediastinal abscess.  Palliative Consult Team is following for pain management and coping.  I met with Minerva this afternoon in her room.  When I entered she said she was having a bad time and wanted a shorter visit.  When I explored what was bad and asked if it were pain, she answered in the affirmative.  When I offered behavioral interventions for that she declined.  She was tearful at times.    Relevant Symptoms/Concerns  - New information since last visit   Physical:  Expressing that she is having pain.   Psychological/Emotional/Existential:  When asked what she thought would help her pain, she answered, \"being at home.\"  Labels her sadness/tears as a \"pity party.\"   Family/Social/Caregiver:   Reluctant to have daughter/granddaughters in [place] visit due to how much time she spends on the commode.    Developmental:      Mental Health:      End of Life:      Cultural/Roman Catholic/Spiritual:      Grief/Loss:  Expresses that though medical plan for the week is to \"change nothing,\" she says that is in some ways more difficult.   Concurrent Stressors:        Comments:       Strengths - New information since last visit    Physical:     Psychological/Emotional/Existential:  Had visit with family yesterday and expresses that was pleasurable.   Family/Social/Caregiver:   Received text message from twelve year old granddaughter and found that very positive.   Developmental:      Mental Health:      End of Life:      Cultural/Roman Catholic/Spiritual:      Grief/Loss:         Comments:       Goals/Decision Making/Advance Care Planning - New information since last visit   Preferences:      Concerns:      Documents:      Decision Making Issues:        Comments:       Resource Needs     Discharge Planning:  Per Unit/Program  and/or Care " Coordinator   Other:      Comments:  Minerva says that discharge to TCU is on hold.    Intervention (Check all that apply)    X   Assessment of palliative specific issues          Introduction of Palliative clinical social work interventions    X   Adjustment to illness counseling        Advanced care planning        Attended/participated in care conference        Behavioral interventions for symptom management    X   Facilitation of processing of thoughts/feelings        Family communication facilitated        Grief counseling        Goals of care discussion/facilitation        Life legacy work        Life review facilitation        Psychoeducation        Re-framing        Resource referral            Other     Comments:       Plan:  Will continue to follow one to two times per week for further assessment/intervention as indicated.

## 2017-06-19 NOTE — PROGRESS NOTES
Social Work Services Progress Note    Hospital Day: 33  Date of Initial Social Work Evaluation:  6/14/17- please see for details  Collaborated with:  Chart review, team rounds, Thoracic team,  TCU admissions    Data:  Pt is being followed for TCU placement for medical cares and has been accepted at  TCU.   Received update from Thoracic team that pt's discharge is no longer anticipated for this week but is now anticipated for next Tuesday/Wednesday. Per Thoracic team, pt's CT on Friday led to team wanting to further monitor pt's wound prior to d/c.     Intervention:  Ovi updated Cynthia with  TCU admissions as of change in anticipated d/c date.     Assessment:  TCU, see bedside RN, medical team notes    Plan:    Anticipated Disposition:  Facility:   TCU    Barriers to d/c plan:  Medical readiness    Follow Up:  ovi GRIMES will continue to follow for d/c planning    BALJIT Oden, MSW  7B   [phone] (pager) [phone]  6/19/2017

## 2017-06-19 NOTE — PLAN OF CARE
Problem: Goal Outcome Summary  Goal: Goal Outcome Summary  Outcome: Improving  VSS, afebrile. Pain controlled. Up independently in room. Frequently going to commode. Pt still cramping and feeling urge to move bowels but stool thickening and less volume. Cholestyramine is new bowel med started two days ago. Pharyngostomy intact with moderate output. Benadryl cream given for itching at insertion site. Will continue with POC.

## 2017-06-19 NOTE — PLAN OF CARE
Problem: Individualization  Goal: Patient Preferences  Outcome: No Change  Vitals:     06/18/17 0752 06/18/17 1615 06/18/17 2100 06/18/17 2143   BP: 120/59 108/51   104/50   BP Location: Left arm Right arm   Left arm   Cuff Size:           Pulse:           Resp: 20 12 16   Temp: 97.4  F (36.3  C) 96.8  F (36  C)   96.1  F (35.6  C)   TempSrc: Oral Oral   Oral   SpO2: 99% 97%   97%   Weight:     40.2 kg (88 lb 11.2 oz)     Height:           From 2139-7144:  Patient with complaints of abdominal pain -given scheduled tylenol, neurontin, has fentanyl patch, and oxycodone with fair relief.  Patient complains of cramping pain with meds down J-tube-sometimes relieved with time, other times causing patient to have lse stool.  Tube feedings going at 25cc/hr until 2300, currently off for the night.   Pharyngostomy site red, benadryl cream applied for itching.   Lungs diminished.  Appears to be sleeping between cares.  Continues on tpn and lipids.  Continue with POC.

## 2017-06-19 NOTE — PROGRESS NOTES
Thoracic surgery progress note    No acute events overnight.  Stools slightly improved.    Temp: 96.6  F (35.9  C) Temp  Min: 96.1  F (35.6  C)  Max: 97.4  F (36.3  C)  Resp: 16 Resp  Min: 12  Max: 20  SpO2: 98 % SpO2  Min: 97 %  Max: 99 %    No Data Recorded  Heart Rate: 71 Heart Rate  Min: 63  Max: 72  BP: 115/54 Systolic (24hrs), Av , Min:104 , Max:120   Diastolic (24hrs), Av, Min:50, Max:59    A&O, NAD  Pharyngostomy tube to LIS w/ green output in canister  Chest wound with purulent staining on the packing and good granulation tissue base  Unlabored breathing on RA  Abd soft, non tender, non distended     I&O  Pharyngostomy 550  Mixed urine/stool 1250     A/P: 72F w/ chronic esophageal perforation s/p Nissen at OSH, now s/p esophagectomy with gastric pull-up c/b recurrent anastomotic leak and mediastinal abscess s/p serial washouts, pharyngostomy tube and most recently EGD w/ stent placement on .     - C/w Fentanyl patch (now at 125/hr) and Oxy PRN. Appreciate palliative recs.  - Pharyngostomy to LIS.   - Strict NPO. C/w TPN and tube feeds @ goal 6 hours overnight, minimize tube feeding interruptions   - Chronic diarrhea - max doses of anti-motility agents. Cholestyramine. Fiber TID.   - Daily dressing changes  - Lovejuliet Rogers PA-C  P: [phone]

## 2017-06-19 NOTE — PLAN OF CARE
Problem: Goal Outcome Summary  Goal: Goal Outcome Summary  Outcome: No Change  B/P: 115/54, T: 96.6, P: 102, R: 16  Patient states pain is tolerable with Oxycodone 15 mg every 3 hours.  Pharyngostomy tube intact hooked to LIS, pulling moderate amount of fluids. Chest wound C/D/I.  G-tube clamped, J-tube intact, infusing tube feeding at 25 ml/hr. Patient reports she will increase tube feeding this afternoon to goal rate of 35 ml/hr.  Up independently. Voiding with loose stools. Mag of 1.8 replaced with 2 g. Continue with POC.     Update: Tube feeding infusing at 30 ml/hr tolerating well.

## 2017-06-19 NOTE — PROGRESS NOTES
CLINICAL NUTRITION SERVICES - BRIEF NOTE  *See RD note from 6/14 for last nutrition reassessment details    - TF: current goal TwoCal HN @ 35 mL/hr x 16 hours (daytime) to provide 1120 kcal (29 kcal/kg) and 47 g PRO (1.2 g/kg) per dosing weight 39 kg. Pt still with difficulty tolerating TF advance beyond 25 mL/hr, however per RN note this afternoon pt plans to adv TF rate to 35 mL/hr today.  Per chart review watery, loose stools appear to be improving (less volume and becoming more formed).  Of note, Nutrisource fiber packets increased from 1 pkt TID to 2 pkts TID on 6/16 per GI recs.    - TPN: 165 g dextrose, 65 g AA, and 200 mL 20% IV lipids daily which provides 1221 kcal (31 kcal/kg), 1.7 g/kg PRO, GIR 2.9 mg/kg/min, and 33% kcal from fat (40 g fat/day = 1.03 g fat/kg/day) per dosing weight 39 kg    ADDENDUM 6/19 @ 1340:  - GI: fecal fat, calprotectin, elastase, alpha 1 antitrypsin, tTg and total IgA levels were checked per GI recs.  All labs WNL except pancreatic elastase which was <6 (indicative of severe exocrine pancreatic insufficiency); However, this test not usually reliable with watery stool samples.  Also, fecal fat tests were normal. Suspect pancreatic elastase results not accurate.    INTERVENTIONS  Implementation  Collaboration with other providers: spoke with pharmacy, recommended adjust lipids to 180 mL daily so as to not exceed 1 g fat/kg/day.  New TPN will provide 1181 kcal (30 kcal/kg), 1.7 g/kg PRO, GIR 2.9 mg/kg/min, and 30% kcal from fat (36 g fat/day = 0.9 g/kg/day) per dosing weight 39 kg.    Monitoring/Evaluation  Progress toward goals will be monitored and evaluated per protocol.     Evie Montanez RD, LD   7B RD Pager: [phone]

## 2017-06-20 LAB
ERYTHROCYTE [DISTWIDTH] IN BLOOD BY AUTOMATED COUNT: 16.6 % (ref 10–15)
HCT VFR BLD AUTO: 27.5 % (ref 35–47)
HGB BLD-MCNC: 8.5 G/DL (ref 11.7–15.7)
MCH RBC QN AUTO: 29.1 PG (ref 26.5–33)
MCHC RBC AUTO-ENTMCNC: 30.9 G/DL (ref 31.5–36.5)
MCV RBC AUTO: 94 FL (ref 78–100)
PLATELET # BLD AUTO: 461 10E9/L (ref 150–450)
RBC # BLD AUTO: 2.92 10E12/L (ref 3.8–5.2)
WBC # BLD AUTO: 8 10E9/L (ref 4–11)

## 2017-06-20 PROCEDURE — 25000125 ZZHC RX 250: Performed by: PHYSICIAN ASSISTANT

## 2017-06-20 PROCEDURE — A9270 NON-COVERED ITEM OR SERVICE: HCPCS | Mod: GY | Performed by: SURGERY

## 2017-06-20 PROCEDURE — 27210429 ZZH NUTRITION PRODUCT INTERMEDIATE LITER

## 2017-06-20 PROCEDURE — A9270 NON-COVERED ITEM OR SERVICE: HCPCS | Mod: GY | Performed by: STUDENT IN AN ORGANIZED HEALTH CARE EDUCATION/TRAINING PROGRAM

## 2017-06-20 PROCEDURE — 25000132 ZZH RX MED GY IP 250 OP 250 PS 637: Mod: GY | Performed by: INTERNAL MEDICINE

## 2017-06-20 PROCEDURE — 25000125 ZZHC RX 250: Performed by: SURGERY

## 2017-06-20 PROCEDURE — 40000558 ZZH STATISTIC CVC DRESSING CHANGE

## 2017-06-20 PROCEDURE — 25000132 ZZH RX MED GY IP 250 OP 250 PS 637: Mod: GY | Performed by: STUDENT IN AN ORGANIZED HEALTH CARE EDUCATION/TRAINING PROGRAM

## 2017-06-20 PROCEDURE — 12000003 ZZH R&B CRITICAL UMMC

## 2017-06-20 PROCEDURE — 25000125 ZZHC RX 250: Performed by: STUDENT IN AN ORGANIZED HEALTH CARE EDUCATION/TRAINING PROGRAM

## 2017-06-20 PROCEDURE — 25000128 H RX IP 250 OP 636: Performed by: STUDENT IN AN ORGANIZED HEALTH CARE EDUCATION/TRAINING PROGRAM

## 2017-06-20 PROCEDURE — A9270 NON-COVERED ITEM OR SERVICE: HCPCS | Mod: GY | Performed by: INTERNAL MEDICINE

## 2017-06-20 PROCEDURE — 25000132 ZZH RX MED GY IP 250 OP 250 PS 637: Mod: GY | Performed by: SURGERY

## 2017-06-20 PROCEDURE — 85027 COMPLETE CBC AUTOMATED: CPT | Performed by: STUDENT IN AN ORGANIZED HEALTH CARE EDUCATION/TRAINING PROGRAM

## 2017-06-20 PROCEDURE — 36592 COLLECT BLOOD FROM PICC: CPT | Performed by: STUDENT IN AN ORGANIZED HEALTH CARE EDUCATION/TRAINING PROGRAM

## 2017-06-20 PROCEDURE — 25000132 ZZH RX MED GY IP 250 OP 250 PS 637: Mod: GY | Performed by: PHYSICIAN ASSISTANT

## 2017-06-20 PROCEDURE — A9270 NON-COVERED ITEM OR SERVICE: HCPCS | Mod: GY | Performed by: PHYSICIAN ASSISTANT

## 2017-06-20 RX ORDER — DIPHENHYDRAMINE HCL 12.5MG/5ML
25 LIQUID (ML) ORAL EVERY 6 HOURS PRN
Status: DISCONTINUED | OUTPATIENT
Start: 2017-06-20 | End: 2017-06-29 | Stop reason: HOSPADM

## 2017-06-20 RX ORDER — DIPHENHYDRAMINE HCL 25 MG
25 CAPSULE ORAL EVERY 6 HOURS PRN
Status: DISCONTINUED | OUTPATIENT
Start: 2017-06-20 | End: 2017-06-20

## 2017-06-20 RX ADMIN — FENTANYL 1 PATCH: 25 PATCH, EXTENDED RELEASE TRANSDERMAL at 17:01

## 2017-06-20 RX ADMIN — OXYCODONE HYDROCHLORIDE 15 MG: 5 SOLUTION ORAL at 13:33

## 2017-06-20 RX ADMIN — Medication 10 ML: at 12:13

## 2017-06-20 RX ADMIN — Medication 10 ML: at 20:43

## 2017-06-20 RX ADMIN — BACITRACIN ZINC: 500 OINTMENT TOPICAL at 08:24

## 2017-06-20 RX ADMIN — SODIUM CHLORIDE, PRESERVATIVE FREE 5 ML: 5 INJECTION INTRAVENOUS at 06:50

## 2017-06-20 RX ADMIN — OXYCODONE HYDROCHLORIDE 15 MG: 5 SOLUTION ORAL at 07:06

## 2017-06-20 RX ADMIN — LOPERAMIDE HYDROCHLORIDE 4 MG: 1 SOLUTION ORAL at 20:43

## 2017-06-20 RX ADMIN — Medication 6 MG: at 12:13

## 2017-06-20 RX ADMIN — I.V. FAT EMULSION 180 ML: 20 EMULSION INTRAVENOUS at 20:42

## 2017-06-20 RX ADMIN — SIMETHICONE 40 MG: 20 SUSPENSION/ DROPS ORAL at 12:13

## 2017-06-20 RX ADMIN — LOPERAMIDE HYDROCHLORIDE 4 MG: 1 SOLUTION ORAL at 08:23

## 2017-06-20 RX ADMIN — Medication 6 MG: at 08:23

## 2017-06-20 RX ADMIN — OXYCODONE HYDROCHLORIDE 15 MG: 5 SOLUTION ORAL at 10:22

## 2017-06-20 RX ADMIN — Medication 6 MG: at 20:43

## 2017-06-20 RX ADMIN — TRAZODONE HYDROCHLORIDE 100 MG: 100 TABLET ORAL at 21:30

## 2017-06-20 RX ADMIN — CHLORHEXIDINE GLUCONATE 15 ML: 1.2 RINSE ORAL at 08:25

## 2017-06-20 RX ADMIN — CHLORHEXIDINE GLUCONATE 15 ML: 1.2 RINSE ORAL at 20:43

## 2017-06-20 RX ADMIN — GABAPENTIN 300 MG: 250 SOLUTION ORAL at 09:24

## 2017-06-20 RX ADMIN — OXYCODONE HYDROCHLORIDE 15 MG: 5 SOLUTION ORAL at 17:01

## 2017-06-20 RX ADMIN — DIPHENHYDRAMINE HYDROCHLORIDE 25 MG: 12.5 SOLUTION ORAL at 01:00

## 2017-06-20 RX ADMIN — Medication 2 PACKET: at 00:57

## 2017-06-20 RX ADMIN — LOPERAMIDE HYDROCHLORIDE 4 MG: 1 SOLUTION ORAL at 17:03

## 2017-06-20 RX ADMIN — Medication 6 MG: at 17:03

## 2017-06-20 RX ADMIN — ACETAMINOPHEN 975 MG: 325 TABLET, FILM COATED ORAL at 00:56

## 2017-06-20 RX ADMIN — DIPHENHYDRAMINE HYDROCHLORIDE 25 MG: 12.5 SOLUTION ORAL at 21:29

## 2017-06-20 RX ADMIN — MINERAL SUPPLEMENT IRON 300 MG / 5 ML STRENGTH LIQUID 100 PER BOX UNFLAVORED 300 MG: at 09:24

## 2017-06-20 RX ADMIN — SIMETHICONE 40 MG: 20 SUSPENSION/ DROPS ORAL at 08:26

## 2017-06-20 RX ADMIN — OXYCODONE HYDROCHLORIDE 15 MG: 5 SOLUTION ORAL at 02:52

## 2017-06-20 RX ADMIN — Medication 250 MG: at 20:42

## 2017-06-20 RX ADMIN — Medication 10 ML: at 08:23

## 2017-06-20 RX ADMIN — FENTANYL 1 PATCH: 100 PATCH, EXTENDED RELEASE TRANSDERMAL at 17:01

## 2017-06-20 RX ADMIN — BACITRACIN ZINC: 500 OINTMENT TOPICAL at 20:56

## 2017-06-20 RX ADMIN — METOPROLOL TARTRATE 25 MG: 100 TABLET ORAL at 20:43

## 2017-06-20 RX ADMIN — SIMETHICONE 40 MG: 20 SUSPENSION/ DROPS ORAL at 20:52

## 2017-06-20 RX ADMIN — LOPERAMIDE HYDROCHLORIDE 4 MG: 1 SOLUTION ORAL at 12:13

## 2017-06-20 RX ADMIN — SIMETHICONE 40 MG: 20 SUSPENSION/ DROPS ORAL at 17:04

## 2017-06-20 RX ADMIN — ENOXAPARIN SODIUM 40 MG: 40 INJECTION SUBCUTANEOUS at 12:13

## 2017-06-20 RX ADMIN — Medication 2 PACKET: at 15:11

## 2017-06-20 RX ADMIN — Medication 250 MG: at 09:24

## 2017-06-20 RX ADMIN — Medication 2 PACKET: at 21:30

## 2017-06-20 RX ADMIN — GABAPENTIN 600 MG: 250 SOLUTION ORAL at 20:43

## 2017-06-20 RX ADMIN — PANTOPRAZOLE SODIUM 40 MG: 40 TABLET, DELAYED RELEASE ORAL at 13:33

## 2017-06-20 RX ADMIN — ACETAMINOPHEN 975 MG: 325 TABLET, FILM COATED ORAL at 17:01

## 2017-06-20 RX ADMIN — Medication 10 ML: at 17:03

## 2017-06-20 RX ADMIN — OXYCODONE HYDROCHLORIDE 15 MG: 5 SOLUTION ORAL at 20:43

## 2017-06-20 RX ADMIN — ACETAMINOPHEN 975 MG: 325 TABLET, FILM COATED ORAL at 09:24

## 2017-06-20 RX ADMIN — SODIUM CHLORIDE, PRESERVATIVE FREE 5 ML: 5 INJECTION INTRAVENOUS at 17:02

## 2017-06-20 RX ADMIN — POTASSIUM CHLORIDE: 2 INJECTION, SOLUTION, CONCENTRATE INTRAVENOUS at 20:41

## 2017-06-20 RX ADMIN — CHLORHEXIDINE GLUCONATE 15 ML: 1.2 RINSE ORAL at 13:33

## 2017-06-20 RX ADMIN — CHOLESTYRAMINE 4 G: 4 POWDER, FOR SUSPENSION ORAL at 10:30

## 2017-06-20 RX ADMIN — Medication 2 PACKET: at 09:25

## 2017-06-20 RX ADMIN — GABAPENTIN 300 MG: 250 SOLUTION ORAL at 17:04

## 2017-06-20 NOTE — PROGRESS NOTES
Thoracic surgery progress note    No acute events overnight.  Stools remain slightly improved.  No acute complaints.    Temp: 96.4  F (35.8  C) Temp  Min: 95.9  F (35.5  C)  Max: 96.4  F (35.8  C)  Resp: 20 Resp  Min: 20  Max: 20  SpO2: 96 % SpO2  Min: 96 %  Max: 99 %    No Data Recorded  Heart Rate: 77 Heart Rate  Min: 66  Max: 77  BP: 112/53 Systolic (24hrs), Av , Min:112 , Max:128   Diastolic (24hrs), Av, Min:53, Max:59    A&O, NAD  Pharyngostomy tube to LIS w/ yellow/green output in canister  Chest wound with small volume purulent fluid on the packing and good granulation tissue at base  Unlabored breathing on RA  Abd soft, non tender, non distended     I&O  Pharyngostomy 475  Mixed urine/stool 2300     A/P: 72F w/ chronic esophageal perforation s/p Nissen at OSH, now s/p esophagectomy with gastric pull-up c/b recurrent anastomotic leak and mediastinal abscess s/p serial washouts, pharyngostomy tube and most recently EGD w/ stent placement on .     - C/w Fentanyl patch (now at 125/hr) and Oxy PRN. Appreciate palliative recs.  - Pharyngostomy to LIS.   - Strict NPO. C/w TPN and tube feeds @ goal 6 hours overnight, minimize tube feeding interruptions   - Chronic diarrhea - max doses of anti-motility agents. Cholestyramine. Fiber TID.   - Daily dressing changes  -Possible TCU in 5-7 days if wounds continue to heal well  - Deja Rogers PA-C  P: [phone]

## 2017-06-20 NOTE — PROGRESS NOTES
"[facility], [place]   Palliative Care Daily Progress Note          Recommendations, Patient/Family Counseling & Coordination     Pain: She is currently on a fentanyl patch 125 mcg/hr and is taking oxycodone 10-20 mg Q 3 hours prn. She has taking about 110-120 mg of oxycodone per day which is about what she was taking scheduled before we started the fentanyl patch.  I approached her about the idea that she might be using the oxycodone to treat emotional pain and she thought it was probably so. She admits she is also taking some \"out of habit\". We talked about trying some non pharmacologic ways of coping and treating the pain. She is willing to try with our palliative . For now we will keep her pain medicines where they are and hopefully slowly taper them in the near future.       Thank you for the opportunity to continue to participate in the care of this patient and family.  Please feel free to contact on-call palliative provider with any emergent needs.  We can be reached via team pager [phone] (answered 8-4:30 Monday-Friday); after-hours answering service ([phone]); Main Palliative Clinic - [facility]: [phone].       The patient was discussed with Dr. Lorenzo Ramirez MD  Palliative medicine fellow  Beeper [phone]    Patient discussed with Dr. Ramirez.   Agree with assessment and recommendations. I did not personally see patient today but discussed case in detail with Dr. Ramirez.         Janice Ventura  Palliative Care   Pager [phone]          Assessment      1) Diagnoses & symptoms:        Minerva Blanco is a 72 year old female with chronic esophageal perforation and recurrent anastomotic leak and mediastinal abscess. She gets serial washouts and has an esophageal stent and pharyngostomy tube for drainage. She needs help with coping and more consistent pain control.     2)  Psychosocial/Spiritual Needs:   Ongoing:          Is new " "assessment/intervention required by palliative team?:  no         Interval History:   Minerva states she is having a better day today than yesterday. She needs to sit on the commode often but not as bad as yesterday. She tells me no one knows why she has the diarrhea and she is suppose to use medicines to bulk up her stools. She thinks these medicines cause some cramping. She continues to have pain but admits to me that she often takes the oxycodone \"out of habit\". She is still frustrated about her prolonged hospital stay and the idea of having to go to a TCU instead of going home at discharge (possibly next week).           Review of Systems:   Palliative Symptom Review (0=no symptom/no concern, 1=mild, 2=moderate, 3=severe):      Pain: 1-2      Fatigue: 1      Nausea: 0      Constipation: 0      Diarrhea: 2      Depressive Symptoms: 2      Anxiety: 1      Drowsiness: 0      Poor Appetite: N/A      Shortness of Breath: 1      Insomnia: 1-2      Overall (0 good/no concerns, 3 very poor):  2            Medications:   I have reviewed this patient's medication profile and medications given in past 24 hours.  Current Facility-Administered Medications   Medication     parenteral nutrition - ADULT compounded formula     diphenhydrAMINE (BENADRYL) solution 25 mg     lipids (INTRALIPID) 20 % infusion 180 mL     parenteral nutrition - ADULT compounded formula     diphenhydrAMINE-zinc acetate (BENADRYL) cream     acetaminophen (TYLENOL) tablet 975 mg     fiber modular (NUTRISOURCE FIBER) packet 2 packet     cholestyramine (QUESTRAN) Packet 4 g     gabapentin (NEURONTIN) solution 600 mg     gabapentin (NEURONTIN) solution 300 mg     opium tincture 10 MG/ML (1%) liquid 6 mg     simethicone (MYLICON) suspension 40 mg     lidocaine (XYLOCAINE) 5 % ointment     fentaNYL (DURAGESIC) Patch in Place     fentaNYL (DURAGESIC) patch REMOVAL     fentaNYL (DURAGESIC) 100 mcg/hr 72 hr patch 1 patch     fentaNYL (DURAGESIC) 25 mcg/hr 72 hr " patch 1 patch     oxyCODONE (ROXICODONE) solution 10-20 mg     Benzocaine (HURRICAINE/TOPEX) 20 % spray     saccharomyces boulardii (FLORASTOR) capsule 250 mg     enoxaparin (LOVENOX) injection 40 mg     bacitracin ointment     chlorhexidine (PERIDEX) 0.12 % solution 15 mL     chlorhexidine (HIBICLENS) 4 % liquid     lidocaine (LIDODERM) 5 % Patch 1 patch    And     lidocaine (LIDODERM) patch REMOVAL    And     lidocaine (LIDODERM) patch in PLACE     lidocaine 1 % 0.5-5 mL     lidocaine (LMX4) kit     sodium chloride (PF) 0.9% PF flush 5-50 mL     lidocaine 1 % 1 mL     lidocaine (LMX4) kit     sodium chloride (PF) 0.9% PF flush 10-20 mL     heparin lock flush 10 UNIT/ML injection 5-10 mL     heparin lock flush 10 UNIT/ML injection 5-10 mL     benzocaine-menthol (CHLORASEPTIC) 6-10 MG lozenge 1 lozenge     metoprolol (LOPRESSOR) suspension 25 mg     sodium chloride (PF) 0.9% PF flush 10-20 mL     sodium chloride (PF) 0.9% PF flush 10 mL     potassium chloride SA (K-DUR/KLOR-CON M) CR tablet 20-40 mEq     potassium chloride (KLOR-CON) Packet 20-40 mEq     potassium chloride 10 mEq in 100 mL intermittent infusion     potassium chloride 10 mEq in 100 mL intermittent infusion with 10 mg lidocaine     potassium chloride 20 mEq in 50 mL intermittent infusion     magnesium sulfate 2 g in NS intermittent infusion (PharMEDium or FV Cmpd)     magnesium sulfate 4 g in 100 mL sterile water (premade)     potassium phosphate 15 mmol in D5W 250 mL intermittent infusion     potassium phosphate 20 mmol in D5W 500 mL intermittent infusion     potassium phosphate 20 mmol in D5W 250 mL intermittent infusion     potassium phosphate 25 mmol in D5W 500 mL intermittent infusion     sodium chloride (PF) 0.9% PF flush 3 mL     sodium chloride (PF) 0.9% PF flush 3 mL     traZODone (DESYREL) tablet 100 mg     diphenoxylate-atropine (LOMOTIL) liquid 10 mL     loperamide (IMODIUM) liquid 4 mg     pantoprazole (PROTONIX) suspension 40 mg      prochlorperazine (COMPAZINE) tablet 5 mg    Or     prochlorperazine (COMPAZINE) injection 5 mg    Or     prochlorperazine (COMPAZINE) Suppository 12.5 mg     dextrose 10 % 1,000 mL infusion     glucose 40 % gel 15-30 g    Or     dextrose 50 % injection 25-50 mL    Or     glucagon injection 1 mg     artificial saliva (BIOTENE DRY MOUTHWASH) liquid 20 mL     ipratropium - albuterol 0.5 mg/2.5 mg/3 mL (DUONEB) neb solution 3 mL     labetalol (NORMODYNE/TRANDATE) injection 10-40 mg     albuterol (PROAIR HFA/PROVENTIL HFA/VENTOLIN HFA) Inhaler 4 puff     albuterol neb solution 2.5 mg     sodium chloride (PF) 0.9% PF flush 3 mL     sodium chloride (PF) 0.9% PF flush 3 mL     naloxone (NARCAN) injection 0.1-0.4 mg     ondansetron (ZOFRAN-ODT) ODT tab 4 mg    Or     ondansetron (ZOFRAN) injection 4 mg     ferrous sulfate 300 (60 FE) MG/5ML syrup 300 mg     Facility-Administered Medications Ordered in Other Encounters   Medication     bupivacaine 0.25 % - EPINEPHrine 1:200,000 injection              Physical Exam:   Vitals were reviewed  Temp: 96.4  F (35.8  C) Temp src: Oral BP: 112/53   Heart Rate: 77 Resp: 20 SpO2: 96 % O2 Device: None (Room air)    Gen: Appears comfortable sitting in bed. No distress.  HEENT:  Conjunctiva clear. Sclera anicteric pharynx clear.pharyngostomy site clear. There is some mild redness anterior neck.  CV: RRR without murmur  Resp: normal work of breathing,   Abd: soft, nt, nd, +BS   Msk: no gross deformity   Skin:  no jaundice  Ext: warm, well perfused. no edema  Neuro:  EOM, vision, Speech fluent. Moves all extremities equally  Mental status/Psych: alert. Oriented. Asks/answers questions appropriately. Affect is sad and tearful at times.               Data Reviewed:   Lab Results   Component Value Date    WBC 8.0 06/20/2017     Lab Results   Component Value Date    RBC 2.92 06/20/2017     Lab Results   Component Value Date    HGB 8.5 06/20/2017     Lab Results   Component Value Date    HCT 27.5  06/20/2017     No components found for: MCT  Lab Results   Component Value Date    MCV 94 06/20/2017     Lab Results   Component Value Date    MCH 29.1 06/20/2017     Lab Results   Component Value Date    MCHC 30.9 06/20/2017     Lab Results   Component Value Date    RDW 16.6 06/20/2017     Lab Results   Component Value Date     06/20/2017

## 2017-06-20 NOTE — PLAN OF CARE
Problem: Goal Outcome Summary  Goal: Goal Outcome Summary  Outcome: No Change  AVSS. Sats 99% RA. Denies SOB. LS clear. Tearful and frustrated at beginning of shift, pleasant and cooperative as shift goes on with encouragement and listening, frequent discussions about POC. C/o pain to pharyngostomy tube site and abd, tolerable w/oxycodone q3hrs. Fentanyl patches in place to upper back. Chest dressing CDI. Pharyngostomy tube to LIS, 100mL green output, irrigated with 10cc per pt tolerance. NPO. TF at 30mL/hr throughout shift, turned off at 22:30 per orders. Liquid watery stools. Voiding adequate amounts. TPN infusing at 60mL/hr, lipids at 15mL/hr per orders via double PICC. Independent to commode. Ambulating in halls w/SBA. Continue POC.

## 2017-06-20 NOTE — PROGRESS NOTES
"Social Work Services Progress Note    Hospital Day: 34  Date of Initial Social Work Evaluation:  6/14/17- please see for details  Collaborated with:  Chart review, team rounds, pt    Data:  Pt is being followed for TCU placement and has been accepted at [facility] with current cares. Thoracic team anticipates pt to be ready to d/c early next week.     Intervention:  Sw met with pt in pt's room to check in and offer support/resources. Pt reported she is having good days and bad days and reported that yesterday was not a good day due to pain. Pt appeared and presented more calm today vs last visit and endorsed feeling this is true and reported that things usually aren't as \"extreme\" as they were during our previous visit. Pt reported that being able to get more sleep for longer periods of time and diarrhea slowing down somewhat have helped her to have better days. Pt is hopeful that new bowel medication will continue to help her diarrhea improve so she can tolerate her tube feeds and have her TPN discontinued.   Pt reported feeling as though she has the support she needs at home when [facility] and reported her  works from home and is available 24/7. Pt reported that she and her  both do house work and daughter who lives locally is very supportive. Pt reported also having [facility] that has all of the services she needs and pt denied needing any further services for home and reported she does not have any concerns about returning home when she is ready.   Sw explained anticipated insurance coverage for TCU.     Assessment:  TCU, see bedside RN, PT/OT, medical team notes    Plan:    Anticipated Disposition:  Facility:  Currently accepted at [facility]    Barriers to d/c plan:  Medical readiness    Follow Up:  ovi GRIMES will continue to follow for d/c planning    BALJIT Oden, MSW  7B   [phone] (pager) [phone]  6/20/2017          "

## 2017-06-20 NOTE — PLAN OF CARE
Problem: Goal Outcome Summary  Goal: Goal Outcome Summary  Outcome: No Change  VSS, afebrile. Pain controlled. Noted redness and edema to neck. Spreading to forehead. MD notified. In to see patient. Oral benadryl given. Pt reported improvement with itching to area. Aquaphor used for comfort. Up to commode frequently. Volume of diarrhea improved. Pt continues to experience cramping and pain with J tube medication infusions. Spoke with MD regarding decreasing bowel meds. Does not feel it's appropriate at this time. Pharyngostomy tube to LIS. Moderate output noted. Pt ambulating hallway.  Went outside with spouse. Will continue with POC.

## 2017-06-21 LAB — PLATELET # BLD AUTO: 426 10E9/L (ref 150–450)

## 2017-06-21 PROCEDURE — 36592 COLLECT BLOOD FROM PICC: CPT | Performed by: THORACIC SURGERY (CARDIOTHORACIC VASCULAR SURGERY)

## 2017-06-21 PROCEDURE — A9270 NON-COVERED ITEM OR SERVICE: HCPCS | Mod: GY | Performed by: INTERNAL MEDICINE

## 2017-06-21 PROCEDURE — 40000802 ZZH SITE CHECK

## 2017-06-21 PROCEDURE — 25000132 ZZH RX MED GY IP 250 OP 250 PS 637: Mod: GY | Performed by: PHYSICIAN ASSISTANT

## 2017-06-21 PROCEDURE — 25000132 ZZH RX MED GY IP 250 OP 250 PS 637: Mod: GY | Performed by: STUDENT IN AN ORGANIZED HEALTH CARE EDUCATION/TRAINING PROGRAM

## 2017-06-21 PROCEDURE — A9270 NON-COVERED ITEM OR SERVICE: HCPCS | Mod: GY | Performed by: PHYSICIAN ASSISTANT

## 2017-06-21 PROCEDURE — 99207 ZZC CDG-CORRECTLY CODED, REVIEWED AND AGREE: CPT | Performed by: INTERNAL MEDICINE

## 2017-06-21 PROCEDURE — 25000128 H RX IP 250 OP 636: Performed by: STUDENT IN AN ORGANIZED HEALTH CARE EDUCATION/TRAINING PROGRAM

## 2017-06-21 PROCEDURE — 85049 AUTOMATED PLATELET COUNT: CPT | Performed by: THORACIC SURGERY (CARDIOTHORACIC VASCULAR SURGERY)

## 2017-06-21 PROCEDURE — 12000003 ZZH R&B CRITICAL UMMC

## 2017-06-21 PROCEDURE — 25000125 ZZHC RX 250: Performed by: SURGERY

## 2017-06-21 PROCEDURE — 27210429 ZZH NUTRITION PRODUCT INTERMEDIATE LITER

## 2017-06-21 PROCEDURE — 25000132 ZZH RX MED GY IP 250 OP 250 PS 637: Mod: GY | Performed by: INTERNAL MEDICINE

## 2017-06-21 PROCEDURE — 99232 SBSQ HOSP IP/OBS MODERATE 35: CPT | Mod: GC | Performed by: INTERNAL MEDICINE

## 2017-06-21 PROCEDURE — A9270 NON-COVERED ITEM OR SERVICE: HCPCS | Mod: GY | Performed by: SURGERY

## 2017-06-21 PROCEDURE — A9270 NON-COVERED ITEM OR SERVICE: HCPCS | Mod: GY | Performed by: STUDENT IN AN ORGANIZED HEALTH CARE EDUCATION/TRAINING PROGRAM

## 2017-06-21 PROCEDURE — 25000125 ZZHC RX 250: Performed by: STUDENT IN AN ORGANIZED HEALTH CARE EDUCATION/TRAINING PROGRAM

## 2017-06-21 PROCEDURE — 25000132 ZZH RX MED GY IP 250 OP 250 PS 637: Mod: GY | Performed by: SURGERY

## 2017-06-21 PROCEDURE — 25000125 ZZHC RX 250: Performed by: PHYSICIAN ASSISTANT

## 2017-06-21 RX ORDER — BENZOCAINE/MENTHOL 6 MG-10 MG
LOZENGE MUCOUS MEMBRANE 2 TIMES DAILY
Status: DISCONTINUED | OUTPATIENT
Start: 2017-06-21 | End: 2017-06-29 | Stop reason: HOSPADM

## 2017-06-21 RX ORDER — FENTANYL 12.5 UG/1
12 PATCH TRANSDERMAL
Status: DISCONTINUED | OUTPATIENT
Start: 2017-06-21 | End: 2017-06-24

## 2017-06-21 RX ADMIN — OXYCODONE HYDROCHLORIDE 5 MG: 5 SOLUTION ORAL at 21:11

## 2017-06-21 RX ADMIN — HYDROCORTISONE: 10 CREAM TOPICAL at 12:26

## 2017-06-21 RX ADMIN — ENOXAPARIN SODIUM 40 MG: 40 INJECTION SUBCUTANEOUS at 12:26

## 2017-06-21 RX ADMIN — METOPROLOL TARTRATE 25 MG: 100 TABLET ORAL at 08:31

## 2017-06-21 RX ADMIN — CHLORHEXIDINE GLUCONATE 15 ML: 1.2 RINSE ORAL at 08:30

## 2017-06-21 RX ADMIN — METOPROLOL TARTRATE 25 MG: 100 TABLET ORAL at 20:49

## 2017-06-21 RX ADMIN — OXYCODONE HYDROCHLORIDE 10 MG: 5 SOLUTION ORAL at 20:53

## 2017-06-21 RX ADMIN — SIMETHICONE 40 MG: 20 SUSPENSION/ DROPS ORAL at 09:48

## 2017-06-21 RX ADMIN — CHOLESTYRAMINE 4 G: 4 POWDER, FOR SUSPENSION ORAL at 21:22

## 2017-06-21 RX ADMIN — OXYCODONE HYDROCHLORIDE 10 MG: 5 SOLUTION ORAL at 09:48

## 2017-06-21 RX ADMIN — CHOLESTYRAMINE 4 G: 4 POWDER, FOR SUSPENSION ORAL at 12:27

## 2017-06-21 RX ADMIN — LOPERAMIDE HYDROCHLORIDE 4 MG: 1 SOLUTION ORAL at 20:49

## 2017-06-21 RX ADMIN — DIPHENHYDRAMINE HYDROCHLORIDE 25 MG: 12.5 SOLUTION ORAL at 21:22

## 2017-06-21 RX ADMIN — Medication 6 MG: at 20:49

## 2017-06-21 RX ADMIN — Medication 6 MG: at 08:30

## 2017-06-21 RX ADMIN — GABAPENTIN 300 MG: 250 SOLUTION ORAL at 17:37

## 2017-06-21 RX ADMIN — GABAPENTIN 300 MG: 250 SOLUTION ORAL at 08:30

## 2017-06-21 RX ADMIN — GABAPENTIN 600 MG: 250 SOLUTION ORAL at 21:22

## 2017-06-21 RX ADMIN — Medication 250 MG: at 09:48

## 2017-06-21 RX ADMIN — POTASSIUM CHLORIDE: 2 INJECTION, SOLUTION, CONCENTRATE INTRAVENOUS at 21:01

## 2017-06-21 RX ADMIN — TRAZODONE HYDROCHLORIDE 100 MG: 100 TABLET ORAL at 21:11

## 2017-06-21 RX ADMIN — LOPERAMIDE HYDROCHLORIDE 4 MG: 1 SOLUTION ORAL at 08:30

## 2017-06-21 RX ADMIN — ACETAMINOPHEN 975 MG: 325 TABLET, FILM COATED ORAL at 17:36

## 2017-06-21 RX ADMIN — DIPHENHYDRAMINE HYDROCHLORIDE 25 MG: 12.5 SOLUTION ORAL at 15:24

## 2017-06-21 RX ADMIN — SIMETHICONE 40 MG: 20 SUSPENSION/ DROPS ORAL at 20:56

## 2017-06-21 RX ADMIN — SODIUM CHLORIDE, PRESERVATIVE FREE 5 ML: 5 INJECTION INTRAVENOUS at 17:56

## 2017-06-21 RX ADMIN — Medication 2 PACKET: at 09:50

## 2017-06-21 RX ADMIN — Medication 2 PACKET: at 21:18

## 2017-06-21 RX ADMIN — BACITRACIN ZINC: 500 OINTMENT TOPICAL at 20:48

## 2017-06-21 RX ADMIN — Medication 10 ML: at 08:30

## 2017-06-21 RX ADMIN — Medication 10 ML: at 12:26

## 2017-06-21 RX ADMIN — Medication 2 PACKET: at 15:24

## 2017-06-21 RX ADMIN — Medication 250 MG: at 20:49

## 2017-06-21 RX ADMIN — CHLORHEXIDINE GLUCONATE 15 ML: 1.2 RINSE ORAL at 17:35

## 2017-06-21 RX ADMIN — PANTOPRAZOLE SODIUM 40 MG: 40 TABLET, DELAYED RELEASE ORAL at 15:26

## 2017-06-21 RX ADMIN — OXYCODONE HYDROCHLORIDE 10 MG: 5 SOLUTION ORAL at 06:17

## 2017-06-21 RX ADMIN — OXYCODONE HYDROCHLORIDE 10 MG: 5 SOLUTION ORAL at 17:36

## 2017-06-21 RX ADMIN — FENTANYL 1 PATCH: 12 PATCH TRANSDERMAL at 17:51

## 2017-06-21 RX ADMIN — Medication 6 MG: at 17:37

## 2017-06-21 RX ADMIN — Medication 10 ML: at 17:37

## 2017-06-21 RX ADMIN — OXYCODONE HYDROCHLORIDE 10 MG: 5 SOLUTION ORAL at 02:06

## 2017-06-21 RX ADMIN — HYDROCORTISONE: 10 CREAM TOPICAL at 20:48

## 2017-06-21 RX ADMIN — I.V. FAT EMULSION 180 ML: 20 EMULSION INTRAVENOUS at 21:01

## 2017-06-21 RX ADMIN — ACETAMINOPHEN 975 MG: 325 TABLET, FILM COATED ORAL at 02:06

## 2017-06-21 RX ADMIN — Medication 10 ML: at 20:53

## 2017-06-21 RX ADMIN — LOPERAMIDE HYDROCHLORIDE 4 MG: 1 SOLUTION ORAL at 12:26

## 2017-06-21 RX ADMIN — OXYCODONE HYDROCHLORIDE 10 MG: 5 SOLUTION ORAL at 12:59

## 2017-06-21 RX ADMIN — SIMETHICONE 40 MG: 20 SUSPENSION/ DROPS ORAL at 17:38

## 2017-06-21 RX ADMIN — SIMETHICONE 40 MG: 20 SUSPENSION/ DROPS ORAL at 12:27

## 2017-06-21 RX ADMIN — ACETAMINOPHEN 975 MG: 325 TABLET, FILM COATED ORAL at 09:47

## 2017-06-21 RX ADMIN — BACITRACIN ZINC: 500 OINTMENT TOPICAL at 09:48

## 2017-06-21 RX ADMIN — CHLORHEXIDINE GLUCONATE 15 ML: 1.2 RINSE ORAL at 20:49

## 2017-06-21 RX ADMIN — MINERAL SUPPLEMENT IRON 300 MG / 5 ML STRENGTH LIQUID 100 PER BOX UNFLAVORED 300 MG: at 09:48

## 2017-06-21 RX ADMIN — LOPERAMIDE HYDROCHLORIDE 4 MG: 1 SOLUTION ORAL at 17:36

## 2017-06-21 RX ADMIN — DIPHENHYDRAMINE HYDROCHLORIDE 25 MG: 12.5 SOLUTION ORAL at 06:17

## 2017-06-21 RX ADMIN — Medication 6 MG: at 12:26

## 2017-06-21 NOTE — PROGRESS NOTES
CLINICAL NUTRITION SERVICES - REASSESSMENT NOTE     Nutrition Prescription    RECOMMENDATIONS FOR MDs/PROVIDERS TO ORDER:  Continue encouraging pt to adv TF as tolerated to goal 35 mL/hr.  Once tolerates consistently @ 35 mL/hr, recommend adv as tolerated to eventual goal 50 mL/hr x 16 hours (if cycled TF desired), see recs below    Malnutrition Status:    Severe malnutrition in the context of chronic illness    Recommendations already ordered by Registered Dietitian (RD):  None additional at this time     Future/Additional Recommendations:  1. If pt continues to tolerate TF @ at least  25 mL/hr x 16 hours, consider stop or decrease frequency of IV lipids    2. Before discontinuing TPN and relying of TF for 100% estimated nutrition needs, recommend pt be able to tolerate either:  a.  TwoCal HN @ 35 ml/hr (840 ml/day) to provide 1680 kcals (43 kcal/kg), 71 g PRO (1.8 g/kg), 588 ml free H2O, 184 g CHO and 4 g Fiber daily.   -- If to provide 100% estimated fluid needs from TF/free water, would recommend 75 mL water flushes q2h so TF + free water = 1488 mL/day free water. May need to adjust this fluid volume recommendation pending hydration status.    OR     b. TwoCal HN @ 50 mL/hr x 16 hours (800 mL/day) to provide 1600 kcal (41 kcal/kg), 67 g PRO (1.7 g/kg), 175 g CHO, 73 g fat, 4 g fiber, and 560 mL free water daily  -- If to provide 100% estimated fluid needs from TF/free water, would recommend pt get an additional ~900 mL/day free water, method pending pt tolerance of free water administration.  Could be given as flushes of ~110 mL 8x/day.  Could also consider given free water via feed-and-flush system-- free water @ 55 mL/hr x 16 hours during TF cycle.  May need to adjust this fluid volume recommendation pending hydration status.     EVALUATION OF THE PROGRESS TOWARD GOALS   Diet: No diet order    TF: TwoCal HN @ 35 mL/hr x 16 hours (560 mL/day, daytime only) to provide 1120 kcal (29 kcal/kg), 47 g PRO (1.2 g/kg),  123 g CHO, 51 g fat, 3 g fiber, and 392 mL/day free water  per dosing weight 39 kg.    TF Intake:   -- Most of past week pt with difficulty tolerating TF >25 mL/hr. Pt reports she has tolerated TF @ 30 mL/hr for about 5 hours at a time over the past 2 days but will start having some loose stools and then decreases rate to 25 mL/hr; she does note, however, that her stools have thickened and volume of stools has decreased.    -- Says she has some abdominal discomfort when giving medications via  J-tube, including Nutrisource fiber, but says she has noticed her stools improve since increasing the fiber packets on 6/16 and being more diligent about taking them everyday as ordered (per med history, pt has been refusing Nutrisource fiber intermittently since admission)    -- 7-day avg TF intake per I/Os = 224 mL/day TwoCal HN which provided 896 kcal (23 kcal/kg) and 19 g PRO (0.5 g/kg) which meets 66% kcal needs and 32% PRO needs per dosing weight 39 kg. Question complete accuracy of TF I/O records given reports of pt changing TF rate (however per chart pt appears to be doing this less often the past week) and level of TF absorption given diarrhea.      Modulars: 2 pkts Nutrisource fiber TID (15 kcal and 3 g soluble fiber each) to provide additional 90 kcal and 18 g soluble fiber. Was increased from 3 pkts per day to 6 pkts per day on 6/16.    TPN: 1440 mL/day with 165 g dextrose, 65 g AA, and 180 mL 20% IV lipids daily which provides 1181 kcal (30 kcal/kg), 1.7 g/kg PRO, GIR 2.9 mg/kg/min, 0.9 g/kg/day fat and 30% kcal from fat per dosing weight 39 kg. This meets 100% estimated TPN kcal provision recs and PRO needs.    TPN Intake: Lipids were on hold 6/12-6/16 due to elevated TG, TG were rechecked 6/16 and came back WNL so lipids added back. TPN w/o lipids (165 g dextrose and 65 g AA) was providing 826 kcal (21 kcal/kg) and 1.5 gm/kg/pro which met 71% kcal needs and 100% PRO needs.  TPN provisions were adjusted on   No  documentatation of TPN interruptions per review of progress notes (per I/Os 7-day avg TPN intake = 1206 mL/day (84% of goal 1440 mL/day, however suspect possible documentation error?)     NEW FINDINGS   Weight: Wt ~stable/possibly trending up the past week, hovering 39-40 kg, unclear how much true weight gain vs fluid related  Labs: BMP, Mg++, and Phos today still standing. Based on last labs 6/19-  Alk phos 361 (H), trending down; Cr 0.31 (L), trending up; TG 84 (WNL)  GI: Stools have become slightly thicker and with less volume over the past 4-5 days.  Fecal fat, calprotectin, elastase, alpha 1 antitrypsin, tTg and total IgA levels were checked per GI recs.  All labs WNL except pancreatic elastase which was <6 (indicative of severe exocrine pancreatic insufficiency); However, this test not usually reliable with watery stool samples.  Also, fecal fat tests were normal. Suspect pancreatic elastase results not accurate.    MALNUTRITION  % Intake: No decreased intake noted between TF + TPN  % Weight Loss: Stable over past week but > 5% in 1 month (severe)   Subcutaneous Fat Loss: Facial region:  moderate, Upper arm:  Moderate/severe and Lower arm:  Moderate/severe  Muscle Loss: Temporal, Facial & jaw region, Upper arm (bicep, tricep), Lower arm  (forearm), Upper leg (quadricep, hamstring), Patellar region,  and Posterior calf:  Moderate/severe  Fluid Accumulation/Edema: None noted per provider notes today  Malnutrition Diagnosis: Severe malnutrition in the context of chronic illness    Previous Goals   Total avg nutritional intake to meet a minimum of 30 kcal/kg (solely from PN) or 35 kcal/kg (PN+EN) and 1.5 g PRO/kg daily (per dosing wt 39 kg).  Evaluation: Difficult to assess    Previous Nutrition Diagnosis  Inadequate enteral nutrition infusion related to diarrhea inhibiting tolerance of goal enteral infusion as evidenced by intermittent rate changes (10-25 mL/hr) or shutting off TF but goal of 35 mL/hr continuous     Evaluation: Improving, updated    CURRENT NUTRITION DIAGNOSIS  Inadequate enteral nutrition infusion related to loose stools/abdominal cramping delaying tolerance of advancing and toleratingTF at goal as evidenced by pt not yet advanced to goal TF rate 35 mL/hr and 7-day avg TF intake meeting 66% kcal needs and 32% PRO needs    INTERVENTIONS  Implementation  Collaboration with other providers: discussed long-term TF recs with primary team for if/when no longer on TPN; team believes pt will likely do daytime cycled TF when off TPN in future    Nutrition education: discussed pt's ongoing TF tolerance issues and improvements.  Discussed future TF recs for if/when no longer on TPN.  Pt asking what criteria has to be met regarding TF tolerance before TPN can be discontinued- writer explains that writer would like to see pt tolerating goal TF (either 50 mL/hr x 16 hours or 35 mL/hr continuously if pt decides to do continuous), but primary team will be involved in final decision as well    Goals  Total avg nutritional intake to meet a minimum of 30 kcal/kg (solely from PN) or 35 kcal/kg (PN+EN) and 1.5 g PRO/kg daily (per dosing wt 39 kg)    Monitoring/Evaluation  Progress toward goals will be monitored and evaluated per protocol.     Evie Montanez, RD, LD   7B RD Pager: [phone]

## 2017-06-21 NOTE — PLAN OF CARE
Problem: Goal Outcome Summary  Goal: Goal Outcome Summary  Outcome: No Change  /61 (BP Location: Left arm)  Pulse 102  Temp 95.9  F (35.5  C) (Oral)  Resp 20  Ht 1.524 m (5')  Wt 40.6 kg (89 lb 8 oz)  SpO2 99%  Breastfeeding? No  BMI 17.48 kg/m2     9000-3182: VSS, Afebrile.  Pt calm and cooperative, sleeping overnight.  NO complaints of nausea or SOB.  Pt reported pain, oxycodone x2 for adequate pain management.  Pt reported pain with G-tube medication administration.  Pt having loose stools overnight, reports less amounts.  Up independently in room.  Pharyngostomy to LIS with adequate output. Pt NPO except ice chips.  Tube feeding turned off around 2000 last night per pt self management.  Redness and itching noted on neck, benadryl administration overnight did not seem to help.  Pt otherwise comfortable, continue with POC.

## 2017-06-21 NOTE — PROGRESS NOTES
"Thoracic surgery progress note    No acute events overnight.  No acute complaints.  Had a \"very good\" night.    Temp: 96.2  F (35.7  C) Temp  Min: 95.9  F (35.5  C)  Max: 96.8  F (36  C)  Resp: 18 Resp  Min: 18  Max: 20  SpO2: 96 % SpO2  Min: 96 %  Max: 99 %    No Data Recorded  Heart Rate: 71 Heart Rate  Min: 71  Max: 84  BP: 117/56 Systolic (24hrs), Av , Min:115 , Max:125   Diastolic (24hrs), Av, Min:56, Max:64    A&O, NAD  Pharyngostomy tube to LIS w/ gastric fluid output in canister  Chest wound with small volume purulent fluid on the packing and good granulation at base  Unlabored breathing on RA  Abd soft, non tender, non distended     I&O  Pharyngostomy 550  Mixed urine/stool 1550     A/P: 72F w/ chronic esophageal perforation s/p Nissen at OSH, now s/p esophagectomy with gastric pull-up c/b recurrent anastomotic leak and mediastinal abscess s/p serial washouts, pharyngostomy tube and most recently EGD w/ stent placement on .     - C/w Fentanyl patch (now at 125/hr) and Oxy PRN. Will likely begin to wean opioids soon.  Appreciate palliative recs.  - Pharyngostomy to LIS.   - Strict NPO. C/w TPN and tube feeds @ goal 6 hours overnight, minimize tube feeding interruptions   - Chronic diarrhea - max doses of anti-motility agents. Cholestyramine. Fiber TID.   - Daily dressing changes  -Possible TCU in 5-7 days if wounds continue to heal well  - Deja Rogers PA-C  P: [phone]   "

## 2017-06-21 NOTE — PROGRESS NOTES
[facility], [place]   Palliative Care Daily Progress Note          Recommendations, Patient/Family Counseling & Coordination     Pain: She is on a fentanyl patch 125 mcg/hr and has oxycodone 10-20 mg Q 3 hours prn ordered.  She has taken slightly less oxycodone in the last 24 hours (10 am-10am) 90 mg and feels her pain is under control. Would recommend decreasing her fentanyl to 112 mcg/hr patch and leaving the oxycodone where it is for now. May be able to reduce prn oxycodone dose in the coming days as well. Will continue to monitor and slowly taper fentanyl patch as able. Our palliative  will work with Minerva on non pharmacologic ways to manage pain.    Coping:  She is willing to work with our our palliative  on strategies to use for coping.  We will continue to follow with you.       Thank you for the opportunity to continue to participate in the care of this patient and family.  Please feel free to contact on-call palliative provider with any emergent needs.  We can be reached via team pager [phone] (answered 8-4:30 Monday-Friday); after-hours answering service ([phone]); [facility] - [facility]: [phone].       The patient was discussed with Dr. Lorenzo Ramirez MD  Palliative medicine fellow  Beeper [phone]    Patient seen and evaluated with Dr. Ramirez.  Agree with assessment and recommendations.    Total time spent was 30 minutes,  >50% of time was spent counseling and/or coordination of care regarding pain, symptoms, support.    Janice Ventura  Palliative Care   Pager [phone]          Assessment      1) Diagnoses & symptoms:        Minerva Blanco is a 72 year old female with chronic esophageal perforation and recurrent anastomotic leak and mediastinal abscess. She gets serial washouts and has an esophageal stent and pharyngostomy tube for drainage. She needs help with coping and more consistent pain  control.     2)  Psychosocial/Spiritual Needs:  Ongoing:          Is new assessment/intervention required by palliative team?:  no         Interval History:   Minerva tells me she is having a better day today then yesterday. She still needs to use the commode quite frequently but has no nausea or vomiting. She has been trying to use the oxycodone only when really needed for pain and has been taking smaller doses and less frequently. She is willing to do some small tapering.            Review of Systems:   Palliative Symptom Review (0=no symptom/no concern, 1=mild, 2=moderate, 3=severe):      Pain: 1      Fatigue: 1      Nausea: 0      Constipation: 0      Diarrhea: 2      Depressive Symptoms: 2      Anxiety: 1      Drowsiness: 0      Poor Appetite: N/A      Shortness of Breath: 0      Insomnia: 1      Overall (0 good/no concerns, 3 very poor):  1-2            Medications:   I have reviewed this patient's medication profile and medications given in past 24 hours.  Current Facility-Administered Medications   Medication     hydrocortisone (CORTAID) 1 % cream     parenteral nutrition - ADULT compounded formula     diphenhydrAMINE (BENADRYL) solution 25 mg     lipids (INTRALIPID) 20 % infusion 180 mL     diphenhydrAMINE-zinc acetate (BENADRYL) cream     acetaminophen (TYLENOL) tablet 975 mg     fiber modular (NUTRISOURCE FIBER) packet 2 packet     cholestyramine (QUESTRAN) Packet 4 g     gabapentin (NEURONTIN) solution 600 mg     gabapentin (NEURONTIN) solution 300 mg     opium tincture 10 MG/ML (1%) liquid 6 mg     simethicone (MYLICON) suspension 40 mg     lidocaine (XYLOCAINE) 5 % ointment     fentaNYL (DURAGESIC) Patch in Place     fentaNYL (DURAGESIC) patch REMOVAL     fentaNYL (DURAGESIC) 100 mcg/hr 72 hr patch 1 patch     fentaNYL (DURAGESIC) 25 mcg/hr 72 hr patch 1 patch     oxyCODONE (ROXICODONE) solution 10-20 mg     Benzocaine (HURRICAINE/TOPEX) 20 % spray     saccharomyces boulardii (FLORASTOR) capsule 250  mg     enoxaparin (LOVENOX) injection 40 mg     bacitracin ointment     chlorhexidine (PERIDEX) 0.12 % solution 15 mL     chlorhexidine (HIBICLENS) 4 % liquid     lidocaine (LIDODERM) 5 % Patch 1 patch    And     lidocaine (LIDODERM) patch REMOVAL    And     lidocaine (LIDODERM) patch in PLACE     lidocaine 1 % 0.5-5 mL     lidocaine (LMX4) kit     sodium chloride (PF) 0.9% PF flush 5-50 mL     lidocaine 1 % 1 mL     lidocaine (LMX4) kit     sodium chloride (PF) 0.9% PF flush 10-20 mL     heparin lock flush 10 UNIT/ML injection 5-10 mL     heparin lock flush 10 UNIT/ML injection 5-10 mL     benzocaine-menthol (CHLORASEPTIC) 6-10 MG lozenge 1 lozenge     metoprolol (LOPRESSOR) suspension 25 mg     sodium chloride (PF) 0.9% PF flush 10-20 mL     sodium chloride (PF) 0.9% PF flush 10 mL     magnesium sulfate 2 g in NS intermittent infusion ([facility] or [facility])     magnesium sulfate 4 g in 100 mL sterile water (premade)     potassium phosphate 15 mmol in D5W 250 mL intermittent infusion     potassium phosphate 20 mmol in D5W 500 mL intermittent infusion     potassium phosphate 20 mmol in D5W 250 mL intermittent infusion     potassium phosphate 25 mmol in D5W 500 mL intermittent infusion     sodium chloride (PF) 0.9% PF flush 3 mL     sodium chloride (PF) 0.9% PF flush 3 mL     traZODone (DESYREL) tablet 100 mg     diphenoxylate-atropine (LOMOTIL) liquid 10 mL     loperamide (IMODIUM) liquid 4 mg     pantoprazole (PROTONIX) suspension 40 mg     prochlorperazine (COMPAZINE) tablet 5 mg    Or     prochlorperazine (COMPAZINE) injection 5 mg    Or     prochlorperazine (COMPAZINE) Suppository 12.5 mg     dextrose 10 % 1,000 mL infusion     glucose 40 % gel 15-30 g    Or     dextrose 50 % injection 25-50 mL    Or     glucagon injection 1 mg     artificial saliva (BIOTENE DRY MOUTHWASH) liquid 20 mL     ipratropium - albuterol 0.5 mg/2.5 mg/3 mL (DUONEB) neb solution 3 mL     labetalol (NORMODYNE/TRANDATE) injection 10-40 mg      albuterol (PROAIR HFA/PROVENTIL HFA/VENTOLIN HFA) Inhaler 4 puff     albuterol neb solution 2.5 mg     sodium chloride (PF) 0.9% PF flush 3 mL     sodium chloride (PF) 0.9% PF flush 3 mL     naloxone (NARCAN) injection 0.1-0.4 mg     ondansetron (ZOFRAN-ODT) ODT tab 4 mg    Or     ondansetron (ZOFRAN) injection 4 mg     ferrous sulfate 300 (60 FE) MG/5ML syrup 300 mg     Facility-Administered Medications Ordered in Other Encounters   Medication     bupivacaine 0.25 % - EPINEPHrine 1:200,000 injection              Physical Exam:   Vitals were reviewed  Temp: 96.2  F (35.7  C) Temp src: Oral BP: 117/56   Heart Rate: 71 Resp: 18 SpO2: 96 % O2 Device: None (Room air)    Gen: Appears comfortable sitting in a chair  HEENT:  Conjunctiva clear. Sclera anicteric. Less redness under chin and anterior neck.  Resp: normal work of breathing  Msk: no gross deformity   Skin:  no jaundice  Ext: warm, well perfused. no edema  Neuro:  EOM, vision, Speech fluent. Moves all extremities equally  Mental status/Psych: alert. Oriented. Asks/answers questions appropriately. Affect is normal               Data Reviewed:   None

## 2017-06-21 NOTE — PROGRESS NOTES
"Palliative Care Inpatient Clinical Social Work Visit    Patient Information:  Minerva is a 72 year old woman with chronic esophageal perforation and recurrent anastomotic leak and mediastinal abscess.  Palliative Consult Team is following for pain management and coping.  I met with Minerva in her room this afternoon.  Shortly after I began my visit with her, her  Enrique arrived and joined us.    Relevant Symptoms/Concerns  - New information since last visit   Physical:  Says she has a new rash today and that the itching is bothering her.   Psychological/Emotional/Existential:  Says she is doing \"okay.\"   Family/Social/Caregiver:       Developmental:      Mental Health:      End of Life:      Cultural/Religion/Spiritual:      Grief/Loss:      Concurrent Stressors:        Comments:  While she has voiced to our Palliative Care medical providers that she is willing to work on coping and on behavioral interventions to augment symptom management, she shows reluctance to engage about this.  I did talk about different interventions I offer in terms of non-pharmacological methods of symptom management.  Minerva stated that she had already tried some of them in the past and they were not helpful.  She also stated that she is working on a hitesh based meditative practice.    Strengths - New information since last visit    Physical: States she has a bit less pain today.   Psychological/Emotional/Existential:      Family/Social/Caregiver:  Enrique appears supportive of Minerva learning some techniques for symptom management.   Developmental:      Mental Health:      End of Life:      Cultural/Religion/Spiritual:  Has a booklet on Biblical meditation that she has begun to read.   Grief/Loss:         Comments:  Enrique described an imagery technique that Minerva has used when she has trouble sleeping.  I discussed how she might use that in the hospital for discomfort and how she might que herself to use it.    Goals/Decision " Making/Advance Care Planning - New information since last visit   Preferences:      Concerns:      Documents:      Decision Making Issues:        Comments:       Resource Needs     Discharge Planning:  Per Unit/Program  and/or Care Coordinator   Other:      Comments:       Intervention (Check all that apply)    X   Assessment of palliative specific issues      X   Introduction of Palliative clinical social work interventions        Adjustment to illness counseling        Advanced care planning        Attended/participated in care conference    X   Behavioral interventions for symptom management    X   Facilitation of processing of thoughts/feelings        Family communication facilitated        Grief counseling        Goals of care discussion/facilitation        Life legacy work        Life review facilitation        Psychoeducation        Re-framing        Resource referral            Other     Comments:       Plan:  Minerva wants to think about whether she will work with me re learning non-pharmacological methods of symptom management.  She is aware that I will not be able to see her again this week and will see her again early next week.

## 2017-06-22 LAB
ANION GAP SERPL CALCULATED.3IONS-SCNC: 6 MMOL/L (ref 3–14)
BUN SERPL-MCNC: 19 MG/DL (ref 7–30)
CALCIUM SERPL-MCNC: 8.6 MG/DL (ref 8.5–10.1)
CHLORIDE SERPL-SCNC: 103 MMOL/L (ref 94–109)
CO2 SERPL-SCNC: 27 MMOL/L (ref 20–32)
CREAT SERPL-MCNC: 0.34 MG/DL (ref 0.52–1.04)
ERYTHROCYTE [DISTWIDTH] IN BLOOD BY AUTOMATED COUNT: 16.6 % (ref 10–15)
GFR SERPL CREATININE-BSD FRML MDRD: ABNORMAL ML/MIN/1.7M2
GLUCOSE SERPL-MCNC: 94 MG/DL (ref 70–99)
HCT VFR BLD AUTO: 28.1 % (ref 35–47)
HGB BLD-MCNC: 8.6 G/DL (ref 11.7–15.7)
MAGNESIUM SERPL-MCNC: 1.7 MG/DL (ref 1.6–2.3)
MCH RBC QN AUTO: 28.7 PG (ref 26.5–33)
MCHC RBC AUTO-ENTMCNC: 30.6 G/DL (ref 31.5–36.5)
MCV RBC AUTO: 94 FL (ref 78–100)
PHOSPHATE SERPL-MCNC: 4 MG/DL (ref 2.5–4.5)
PLATELET # BLD AUTO: 437 10E9/L (ref 150–450)
POTASSIUM SERPL-SCNC: 3.9 MMOL/L (ref 3.4–5.3)
RBC # BLD AUTO: 3 10E12/L (ref 3.8–5.2)
SODIUM SERPL-SCNC: 136 MMOL/L (ref 133–144)
WBC # BLD AUTO: 8.5 10E9/L (ref 4–11)

## 2017-06-22 PROCEDURE — 12000003 ZZH R&B CRITICAL UMMC

## 2017-06-22 PROCEDURE — 25000128 H RX IP 250 OP 636: Performed by: STUDENT IN AN ORGANIZED HEALTH CARE EDUCATION/TRAINING PROGRAM

## 2017-06-22 PROCEDURE — 99233 SBSQ HOSP IP/OBS HIGH 50: CPT | Mod: GC | Performed by: INTERNAL MEDICINE

## 2017-06-22 PROCEDURE — 25000132 ZZH RX MED GY IP 250 OP 250 PS 637: Mod: GY | Performed by: STUDENT IN AN ORGANIZED HEALTH CARE EDUCATION/TRAINING PROGRAM

## 2017-06-22 PROCEDURE — A9270 NON-COVERED ITEM OR SERVICE: HCPCS | Mod: GY | Performed by: STUDENT IN AN ORGANIZED HEALTH CARE EDUCATION/TRAINING PROGRAM

## 2017-06-22 PROCEDURE — 80048 BASIC METABOLIC PNL TOTAL CA: CPT | Performed by: SURGERY

## 2017-06-22 PROCEDURE — 84100 ASSAY OF PHOSPHORUS: CPT | Performed by: SURGERY

## 2017-06-22 PROCEDURE — A9270 NON-COVERED ITEM OR SERVICE: HCPCS | Mod: GY | Performed by: INTERNAL MEDICINE

## 2017-06-22 PROCEDURE — 25000132 ZZH RX MED GY IP 250 OP 250 PS 637: Mod: GY | Performed by: PHYSICIAN ASSISTANT

## 2017-06-22 PROCEDURE — 25000125 ZZHC RX 250: Performed by: PHYSICIAN ASSISTANT

## 2017-06-22 PROCEDURE — 83735 ASSAY OF MAGNESIUM: CPT | Performed by: SURGERY

## 2017-06-22 PROCEDURE — 25000125 ZZHC RX 250: Performed by: SURGERY

## 2017-06-22 PROCEDURE — 27210429 ZZH NUTRITION PRODUCT INTERMEDIATE LITER

## 2017-06-22 PROCEDURE — 99207 ZZC CDG-CORRECTLY CODED, REVIEWED AND AGREE: CPT | Performed by: INTERNAL MEDICINE

## 2017-06-22 PROCEDURE — 36592 COLLECT BLOOD FROM PICC: CPT | Performed by: SURGERY

## 2017-06-22 PROCEDURE — 40000802 ZZH SITE CHECK

## 2017-06-22 PROCEDURE — 85027 COMPLETE CBC AUTOMATED: CPT | Performed by: SURGERY

## 2017-06-22 PROCEDURE — 25000132 ZZH RX MED GY IP 250 OP 250 PS 637: Mod: GY | Performed by: SURGERY

## 2017-06-22 PROCEDURE — A9270 NON-COVERED ITEM OR SERVICE: HCPCS | Mod: GY | Performed by: PHYSICIAN ASSISTANT

## 2017-06-22 PROCEDURE — A9270 NON-COVERED ITEM OR SERVICE: HCPCS | Mod: GY | Performed by: SURGERY

## 2017-06-22 PROCEDURE — 25000125 ZZHC RX 250: Performed by: STUDENT IN AN ORGANIZED HEALTH CARE EDUCATION/TRAINING PROGRAM

## 2017-06-22 PROCEDURE — 25000132 ZZH RX MED GY IP 250 OP 250 PS 637: Mod: GY | Performed by: INTERNAL MEDICINE

## 2017-06-22 RX ADMIN — GABAPENTIN 300 MG: 250 SOLUTION ORAL at 09:31

## 2017-06-22 RX ADMIN — Medication 6 MG: at 11:25

## 2017-06-22 RX ADMIN — DIPHENHYDRAMINE HYDROCHLORIDE 25 MG: 12.5 SOLUTION ORAL at 11:24

## 2017-06-22 RX ADMIN — Medication 6 MG: at 20:10

## 2017-06-22 RX ADMIN — OXYCODONE HYDROCHLORIDE 10 MG: 5 SOLUTION ORAL at 11:20

## 2017-06-22 RX ADMIN — Medication 250 MG: at 20:10

## 2017-06-22 RX ADMIN — BACITRACIN ZINC: 500 OINTMENT TOPICAL at 20:10

## 2017-06-22 RX ADMIN — LOPERAMIDE HYDROCHLORIDE 4 MG: 1 SOLUTION ORAL at 11:25

## 2017-06-22 RX ADMIN — CHLORHEXIDINE GLUCONATE 15 ML: 1.2 RINSE ORAL at 09:33

## 2017-06-22 RX ADMIN — GABAPENTIN 300 MG: 250 SOLUTION ORAL at 16:20

## 2017-06-22 RX ADMIN — Medication 10 ML: at 09:29

## 2017-06-22 RX ADMIN — Medication 250 MG: at 09:31

## 2017-06-22 RX ADMIN — Medication 10 ML: at 11:20

## 2017-06-22 RX ADMIN — OXYCODONE HYDROCHLORIDE 15 MG: 5 SOLUTION ORAL at 16:39

## 2017-06-22 RX ADMIN — SODIUM CHLORIDE, PRESERVATIVE FREE 5 ML: 5 INJECTION INTRAVENOUS at 16:20

## 2017-06-22 RX ADMIN — SIMETHICONE 40 MG: 20 SUSPENSION/ DROPS ORAL at 11:25

## 2017-06-22 RX ADMIN — SIMETHICONE 40 MG: 20 SUSPENSION/ DROPS ORAL at 16:22

## 2017-06-22 RX ADMIN — OXYCODONE HYDROCHLORIDE 15 MG: 5 SOLUTION ORAL at 23:40

## 2017-06-22 RX ADMIN — SIMETHICONE 40 MG: 20 SUSPENSION/ DROPS ORAL at 09:32

## 2017-06-22 RX ADMIN — OXYCODONE HYDROCHLORIDE 10 MG: 5 SOLUTION ORAL at 06:43

## 2017-06-22 RX ADMIN — ACETAMINOPHEN 975 MG: 325 TABLET, FILM COATED ORAL at 01:00

## 2017-06-22 RX ADMIN — Medication 2 G: at 11:40

## 2017-06-22 RX ADMIN — CHOLESTYRAMINE 4 G: 4 POWDER, FOR SUSPENSION ORAL at 09:31

## 2017-06-22 RX ADMIN — GABAPENTIN 600 MG: 250 SOLUTION ORAL at 21:23

## 2017-06-22 RX ADMIN — METOPROLOL TARTRATE 25 MG: 100 TABLET ORAL at 09:31

## 2017-06-22 RX ADMIN — CHOLESTYRAMINE 4 G: 4 POWDER, FOR SUSPENSION ORAL at 21:23

## 2017-06-22 RX ADMIN — LOPERAMIDE HYDROCHLORIDE 4 MG: 1 SOLUTION ORAL at 09:31

## 2017-06-22 RX ADMIN — OXYCODONE HYDROCHLORIDE 10 MG: 5 SOLUTION ORAL at 01:00

## 2017-06-22 RX ADMIN — HYDROCORTISONE: 10 CREAM TOPICAL at 09:33

## 2017-06-22 RX ADMIN — LOPERAMIDE HYDROCHLORIDE 4 MG: 1 SOLUTION ORAL at 20:09

## 2017-06-22 RX ADMIN — ACETAMINOPHEN 975 MG: 325 TABLET, FILM COATED ORAL at 09:32

## 2017-06-22 RX ADMIN — DIPHENHYDRAMINE HYDROCHLORIDE 25 MG: 12.5 SOLUTION ORAL at 20:15

## 2017-06-22 RX ADMIN — ACETAMINOPHEN 975 MG: 325 TABLET, FILM COATED ORAL at 16:20

## 2017-06-22 RX ADMIN — ENOXAPARIN SODIUM 40 MG: 40 INJECTION SUBCUTANEOUS at 11:25

## 2017-06-22 RX ADMIN — Medication 6 MG: at 16:20

## 2017-06-22 RX ADMIN — HYDROCORTISONE: 10 CREAM TOPICAL at 21:23

## 2017-06-22 RX ADMIN — SIMETHICONE 40 MG: 20 SUSPENSION/ DROPS ORAL at 20:10

## 2017-06-22 RX ADMIN — MINERAL SUPPLEMENT IRON 300 MG / 5 ML STRENGTH LIQUID 100 PER BOX UNFLAVORED 300 MG: at 09:33

## 2017-06-22 RX ADMIN — Medication 10 ML: at 16:20

## 2017-06-22 RX ADMIN — Medication 10 ML: at 20:09

## 2017-06-22 RX ADMIN — Medication 6 MG: at 09:30

## 2017-06-22 RX ADMIN — BACITRACIN ZINC: 500 OINTMENT TOPICAL at 09:36

## 2017-06-22 RX ADMIN — LOPERAMIDE HYDROCHLORIDE 4 MG: 1 SOLUTION ORAL at 16:20

## 2017-06-22 RX ADMIN — POTASSIUM CHLORIDE: 2 INJECTION, SOLUTION, CONCENTRATE INTRAVENOUS at 20:09

## 2017-06-22 RX ADMIN — METOPROLOL TARTRATE 25 MG: 100 TABLET ORAL at 20:11

## 2017-06-22 RX ADMIN — TRAZODONE HYDROCHLORIDE 100 MG: 100 TABLET ORAL at 21:23

## 2017-06-22 RX ADMIN — OXYCODONE HYDROCHLORIDE 15 MG: 5 SOLUTION ORAL at 20:30

## 2017-06-22 RX ADMIN — I.V. FAT EMULSION 180 ML: 20 EMULSION INTRAVENOUS at 20:09

## 2017-06-22 RX ADMIN — Medication 2 PACKET: at 21:24

## 2017-06-22 RX ADMIN — Medication 2 PACKET: at 09:37

## 2017-06-22 NOTE — PROGRESS NOTES
Thoracic surgery progress note    No acute events overnight.  No acute complaints.       Temp: 96.9  F (36.1  C) Temp  Min: 96.4  F (35.8  C)  Max: 96.9  F (36.1  C)  Resp: 18 Resp  Min: 18  Max: 18  SpO2: 100 % SpO2  Min: 98 %  Max: 100 %    No Data Recorded  Heart Rate: 87 Heart Rate  Min: 69  Max: 87  BP: 133/53 Systolic (24hrs), Av , Min:108 , Max:133   Diastolic (24hrs), Av, Min:53, Max:62    A&O, NAD  Pharyngostomy tube to LIS w/ gastric fluid output in canister  Chest wound with slightly more purulent fluid on packing and in wound, good granulation at base  Unlabored breathing on RA  Abd soft, non tender, non distended     I&O  Pharyngostomy 1L  Mixed urine/stool 700     A/P: 72F w/ chronic esophageal perforation s/p Nissen at OSH, now s/p esophagectomy with gastric pull-up c/b recurrent anastomotic leak and mediastinal abscess s/p serial washouts, pharyngostomy tube and most recently EGD w/ stent placement on .     - C/w Fentanyl patch (now at 112/hr) and Oxy PRN. Continue to wean per palliative recs.  - Pharyngostomy to LIS.   - Strict NPO. C/w TPN and tube feeds @ goal 6 hours overnight, minimize tube feeding interruptions   - Chronic diarrhea - max doses of anti-motility agents. Cholestyramine. Fiber TID.   - Daily dressing changes  -Possible TCU in 4-5 days if wounds continue to heal well  - Deja Rogers PA-C  P: [phone]

## 2017-06-22 NOTE — PLAN OF CARE
Problem: Goal Outcome Summary  Goal: Goal Outcome Summary  Outcome: Improving  VSS, afebrile. Pain controlled. Up to commode frequently. Loose stools mixed with urine. Pt ambulating hallway. Pharnygostomy tube to LIS. Moderate output. J-tube with tube feed runnig at 30/hr. Pt tolerating. TPN continuous. Noted redness and edema to neck. Hydrocortisone applied. Pt reported relief. Will continue with POC.

## 2017-06-22 NOTE — PLAN OF CARE
Problem: Goal Outcome Summary  Goal: Goal Outcome Summary  Outcome: No Change  1900-0730: VSS, Afebrile.  Pt calm and cooperative overnight.  No complaints of nausea or SOB.  Pt reported pain, adequately managed with oxycodone.  Pt up independently in room.  Pharyngostomy flushed.  Chest incision dressing intact, with small amounts of drainage.  Pt NPO tolerating ice chips.  Continued to have loose BM mixed with urine.  Pt otherwise comfortable, continue with POC.

## 2017-06-22 NOTE — PROGRESS NOTES
[facility], [place]   Palliative Care Daily Progress Note          Recommendations, Patient/Family Counseling & Coordination     Pain:  Her fentanyl patch was decreased to 112 mcg/hr yesterday and her overall oxycodone usage is down. She used 65 mg of oxycodone total yesterday and has only used 30 mg today from midnight to 1430.  We would like to continue to slowly taper the fentanyl patch, possibly by decreasing by 12mcg dose every patch change (every 3 days). We had a nice discussion with Minerva and her  Enrique about why we want to slowly decrease her opiate use but don't want her to feel pressured to have to go too fast or to not take pain medication when she is having pain.  - would plan to decrease fentanyl dose to 100mcg/hr on Saturday    Diarrhea:  Chronic, has been seen by GI. Currently on scheduled lomotil, imodium, tincture of opium, simethicone, cholestyramine and fiber. Pt says she has tried tincture of opium in past without any noticeable benefit AND it is just too expensive for her to continue to fill scripts after she discharges and she plans on stopping it when she discharges. With all of her systemic opioids that she is on I doubt that the tincture of opium is having any additional effect--for that reason, plus the fact that she plans on stopping it as outpatient anyway, i think its best if we do a trial off of it here to see how she does (rather than waiting for her to stop it on her own outside the hospital)  - recommend changing tincture of opium to PRN only and if diarrhea does not get worse after stopping the scheduled dose and if she is not needing any prn doses then would stop altogether (see reasoning above)        Coping: The palliative social will continue to meet with her and hopefully, Minerva will be willing to try some non pharmacologic methods of coping and symptom management.        Thank you for the opportunity to continue to participate in the  care of this patient and family.  Please feel free to contact on-call palliative provider with any emergent needs.  We can be reached via team pager [phone] (answered 8-4:30 Monday-Friday); after-hours answering service ([phone]); Main Palliative [facility] - [facility]: [phone].       The patient was discussed with Dr. Lorenzo Ramirez MD  Palliative medicine fellow  Beeper [phone]    Patient seen and evaluated with Dr. Ramirez.    Agree with assessment and recommendations.    Total time spent was 45 minutes,  >50% of time was spent counseling and/or coordination of care regarding pain control, diarrhea, coping, support.    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 She gets serial washouts and has an esophageal stent and pharyngostomy tube for drainage. She needs help with coping and more consistent pain control.     2)  Psychosocial/Spiritual Needs:   Ongoing: Has had visits with our palliative  and is considering non pharmacologic methods of pain and coping management.          Is new assessment/intervention required by palliative team?:  no         Interval History:   Yesterday, we decreased her fentanyl patch from 125 mcg to 112 mcg. She doesn't notice any increased pain and has been consciously trying to only use the oxycodone for times when she has somatic pain, not when she is overwhelmed or upset. Our palliative  came by yesterday to talk about non pharmacologic methods of symptom management and she was reluctant to engage in this. Wanted time to consider this further. She inquired if there was any way to simplify some of her medicines used for diarrhea, possibly stopping the tincture of opium as she doesn't think she will be using it at home.           Review  of Systems:   Palliative Symptom Review (0=no symptom/no concern, 1=mild, 2=moderate, 3=severe):      Pain: 1      Fatigue: 1      Nausea: 0      Constipation: 0      Diarrhea: 2      Depressive Symptoms: 2      Anxiety: 1      Drowsiness: 0      Poor Appetite: N/A      Shortness of Breath: 0      Insomnia: 1      Overall (0 good/no concerns, 3 very poor):  1-2            Medications:   I have reviewed this patient's medication profile and medications given in past 24 hours.  Current Facility-Administered Medications   Medication     parenteral nutrition - ADULT compounded formula     hydrocortisone (CORTAID) 1 % cream     parenteral nutrition - ADULT compounded formula     fentaNYL (DURAGESIC) Patch in Place     [START ON 6/24/2017] fentaNYL (DURAGESIC) patch REMOVAL     fentaNYL (DURAGESIC) 12 mcg/hr 72 hr patch 1 patch     diphenhydrAMINE (BENADRYL) solution 25 mg     lipids (INTRALIPID) 20 % infusion 180 mL     diphenhydrAMINE-zinc acetate (BENADRYL) cream     acetaminophen (TYLENOL) tablet 975 mg     fiber modular (NUTRISOURCE FIBER) packet 2 packet     cholestyramine (QUESTRAN) Packet 4 g     gabapentin (NEURONTIN) solution 600 mg     gabapentin (NEURONTIN) solution 300 mg     opium tincture 10 MG/ML (1%) liquid 6 mg     simethicone (MYLICON) suspension 40 mg     lidocaine (XYLOCAINE) 5 % ointment     fentaNYL (DURAGESIC) Patch in Place     fentaNYL (DURAGESIC) patch REMOVAL     fentaNYL (DURAGESIC) 100 mcg/hr 72 hr patch 1 patch     oxyCODONE (ROXICODONE) solution 10-20 mg     Benzocaine (HURRICAINE/TOPEX) 20 % spray     saccharomyces boulardii (FLORASTOR) capsule 250 mg     enoxaparin (LOVENOX) injection 40 mg     bacitracin ointment     chlorhexidine (PERIDEX) 0.12 % solution 15 mL     chlorhexidine (HIBICLENS) 4 % liquid     lidocaine (LIDODERM) 5 % Patch 1 patch    And     lidocaine (LIDODERM) patch REMOVAL    And     lidocaine (LIDODERM) patch in PLACE     lidocaine 1 % 0.5-5 mL     lidocaine (LMX4) kit      sodium chloride (PF) 0.9% PF flush 5-50 mL     lidocaine 1 % 1 mL     lidocaine (LMX4) kit     sodium chloride (PF) 0.9% PF flush 10-20 mL     heparin lock flush 10 UNIT/ML injection 5-10 mL     heparin lock flush 10 UNIT/ML injection 5-10 mL     benzocaine-menthol (CHLORASEPTIC) 6-10 MG lozenge 1 lozenge     metoprolol (LOPRESSOR) suspension 25 mg     sodium chloride (PF) 0.9% PF flush 10-20 mL     sodium chloride (PF) 0.9% PF flush 10 mL     magnesium sulfate 2 g in NS intermittent infusion (PharMEDium or FV Cmpd)     magnesium sulfate 4 g in 100 mL sterile water (premade)     potassium phosphate 15 mmol in D5W 250 mL intermittent infusion     potassium phosphate 20 mmol in D5W 500 mL intermittent infusion     potassium phosphate 20 mmol in D5W 250 mL intermittent infusion     potassium phosphate 25 mmol in D5W 500 mL intermittent infusion     sodium chloride (PF) 0.9% PF flush 3 mL     sodium chloride (PF) 0.9% PF flush 3 mL     traZODone (DESYREL) tablet 100 mg     diphenoxylate-atropine (LOMOTIL) liquid 10 mL     loperamide (IMODIUM) liquid 4 mg     pantoprazole (PROTONIX) suspension 40 mg     prochlorperazine (COMPAZINE) tablet 5 mg    Or     prochlorperazine (COMPAZINE) injection 5 mg    Or     prochlorperazine (COMPAZINE) Suppository 12.5 mg     dextrose 10 % 1,000 mL infusion     glucose 40 % gel 15-30 g    Or     dextrose 50 % injection 25-50 mL    Or     glucagon injection 1 mg     artificial saliva (BIOTENE DRY MOUTHWASH) liquid 20 mL     ipratropium - albuterol 0.5 mg/2.5 mg/3 mL (DUONEB) neb solution 3 mL     labetalol (NORMODYNE/TRANDATE) injection 10-40 mg     albuterol (PROAIR HFA/PROVENTIL HFA/VENTOLIN HFA) Inhaler 4 puff     albuterol neb solution 2.5 mg     sodium chloride (PF) 0.9% PF flush 3 mL     sodium chloride (PF) 0.9% PF flush 3 mL     naloxone (NARCAN) injection 0.1-0.4 mg     ondansetron (ZOFRAN-ODT) ODT tab 4 mg    Or     ondansetron (ZOFRAN) injection 4 mg     ferrous sulfate 300  (60 FE) MG/5ML syrup 300 mg     Facility-Administered Medications Ordered in Other Encounters   Medication     bupivacaine 0.25 % - EPINEPHrine 1:200,000 injection              Physical Exam:   Vitals were reviewed  Temp: 96.2  F (35.7  C) Temp src: Oral BP: 122/51   Heart Rate: 74 Resp: 18 SpO2: 97 % O2 Device: None (Room air)    Gen: Appears comfortable sitting in a chair  HEENT:  Conjunctiva clear. Sclera anicteric. Pharyngostomy tube site clear.  Resp: normal work of breathing  Msk: no gross deformity   Skin:  no jaundice  Ext: warm, well perfused. no edema  Neuro:  EOM, vision, Speech fluent. Moves all extremities equally  Mental status/Psych: alert. Oriented. Asks/answers questions appropriately. Affect is normal             Data Reviewed:   Lab Results   Component Value Date    WBC 8.5 06/22/2017     Lab Results   Component Value Date    RBC 3.00 06/22/2017     Lab Results   Component Value Date    HGB 8.6 06/22/2017     Lab Results   Component Value Date    HCT 28.1 06/22/2017     Lab Results   Component Value Date    MCV 94 06/22/2017     Lab Results   Component Value Date    MCH 28.7 06/22/2017     Lab Results   Component Value Date    MCHC 30.6 06/22/2017     Lab Results   Component Value Date    RDW 16.6 06/22/2017     Lab Results   Component Value Date     06/22/2017     Lab Results   Component Value Date     06/22/2017      Lab Results   Component Value Date    POTASSIUM 3.9 06/22/2017     Lab Results   Component Value Date    CHLORIDE 103 06/22/2017     Lab Results   Component Value Date    ANNABELLE 8.6 06/22/2017     Lab Results   Component Value Date    CO2 27 06/22/2017     Lab Results   Component Value Date    BUN 19 06/22/2017     Lab Results   Component Value Date    CR 0.34 06/22/2017     Lab Results   Component Value Date    GLC 94 06/22/2017

## 2017-06-23 ENCOUNTER — APPOINTMENT (OUTPATIENT)
Dept: CT IMAGING | Facility: CLINIC | Age: 72
DRG: 856 | End: 2017-06-23
Attending: STUDENT IN AN ORGANIZED HEALTH CARE EDUCATION/TRAINING PROGRAM
Payer: MEDICARE

## 2017-06-23 PROCEDURE — 25000128 H RX IP 250 OP 636: Performed by: STUDENT IN AN ORGANIZED HEALTH CARE EDUCATION/TRAINING PROGRAM

## 2017-06-23 PROCEDURE — A9270 NON-COVERED ITEM OR SERVICE: HCPCS | Mod: GY | Performed by: INTERNAL MEDICINE

## 2017-06-23 PROCEDURE — A9270 NON-COVERED ITEM OR SERVICE: HCPCS | Mod: GY | Performed by: STUDENT IN AN ORGANIZED HEALTH CARE EDUCATION/TRAINING PROGRAM

## 2017-06-23 PROCEDURE — 25000132 ZZH RX MED GY IP 250 OP 250 PS 637: Mod: GY | Performed by: STUDENT IN AN ORGANIZED HEALTH CARE EDUCATION/TRAINING PROGRAM

## 2017-06-23 PROCEDURE — 25000125 ZZHC RX 250: Performed by: PHYSICIAN ASSISTANT

## 2017-06-23 PROCEDURE — 25000132 ZZH RX MED GY IP 250 OP 250 PS 637: Mod: GY | Performed by: PHYSICIAN ASSISTANT

## 2017-06-23 PROCEDURE — 27210429 ZZH NUTRITION PRODUCT INTERMEDIATE LITER

## 2017-06-23 PROCEDURE — A9270 NON-COVERED ITEM OR SERVICE: HCPCS | Mod: GY | Performed by: PHYSICIAN ASSISTANT

## 2017-06-23 PROCEDURE — 25000125 ZZHC RX 250: Performed by: STUDENT IN AN ORGANIZED HEALTH CARE EDUCATION/TRAINING PROGRAM

## 2017-06-23 PROCEDURE — 25000132 ZZH RX MED GY IP 250 OP 250 PS 637: Mod: GY | Performed by: SURGERY

## 2017-06-23 PROCEDURE — 25000125 ZZHC RX 250: Performed by: SURGERY

## 2017-06-23 PROCEDURE — 71260 CT THORAX DX C+: CPT

## 2017-06-23 PROCEDURE — A9270 NON-COVERED ITEM OR SERVICE: HCPCS | Mod: GY | Performed by: SURGERY

## 2017-06-23 PROCEDURE — 25000132 ZZH RX MED GY IP 250 OP 250 PS 637: Mod: GY | Performed by: INTERNAL MEDICINE

## 2017-06-23 PROCEDURE — 12000003 ZZH R&B CRITICAL UMMC

## 2017-06-23 RX ORDER — IOPAMIDOL 755 MG/ML
44 INJECTION, SOLUTION INTRAVASCULAR ONCE
Status: COMPLETED | OUTPATIENT
Start: 2017-06-23 | End: 2017-06-23

## 2017-06-23 RX ORDER — ACETAMINOPHEN 325 MG/1
975 TABLET ORAL 3 TIMES DAILY
Status: DISCONTINUED | OUTPATIENT
Start: 2017-06-23 | End: 2017-06-29 | Stop reason: HOSPADM

## 2017-06-23 RX ORDER — MORPHINE 10 MG/ML
0.6 TINCTURE ORAL EVERY 6 HOURS PRN
Status: DISCONTINUED | OUTPATIENT
Start: 2017-06-23 | End: 2017-06-29 | Stop reason: HOSPADM

## 2017-06-23 RX ADMIN — Medication 10 ML: at 10:34

## 2017-06-23 RX ADMIN — SIMETHICONE 40 MG: 20 SUSPENSION/ DROPS ORAL at 16:24

## 2017-06-23 RX ADMIN — Medication 250 MG: at 19:49

## 2017-06-23 RX ADMIN — LOPERAMIDE HYDROCHLORIDE 4 MG: 1 SOLUTION ORAL at 17:36

## 2017-06-23 RX ADMIN — DIPHENHYDRAMINE HYDROCHLORIDE 25 MG: 12.5 SOLUTION ORAL at 03:22

## 2017-06-23 RX ADMIN — Medication 250 MG: at 10:45

## 2017-06-23 RX ADMIN — Medication 10 ML: at 16:21

## 2017-06-23 RX ADMIN — BACITRACIN ZINC: 500 OINTMENT TOPICAL at 10:39

## 2017-06-23 RX ADMIN — MINERAL SUPPLEMENT IRON 300 MG / 5 ML STRENGTH LIQUID 100 PER BOX UNFLAVORED 300 MG: at 10:38

## 2017-06-23 RX ADMIN — CHLORHEXIDINE GLUCONATE 15 ML: 1.2 RINSE ORAL at 19:50

## 2017-06-23 RX ADMIN — GABAPENTIN 300 MG: 250 SOLUTION ORAL at 16:21

## 2017-06-23 RX ADMIN — IOPAMIDOL 44 ML: 755 INJECTION, SOLUTION INTRAVENOUS at 09:08

## 2017-06-23 RX ADMIN — TRAZODONE HYDROCHLORIDE 100 MG: 100 TABLET ORAL at 21:54

## 2017-06-23 RX ADMIN — Medication 2 PACKET: at 14:21

## 2017-06-23 RX ADMIN — SIMETHICONE 40 MG: 20 SUSPENSION/ DROPS ORAL at 19:54

## 2017-06-23 RX ADMIN — SODIUM CHLORIDE, PRESERVATIVE FREE 5 ML: 5 INJECTION INTRAVENOUS at 16:21

## 2017-06-23 RX ADMIN — PANTOPRAZOLE SODIUM 40 MG: 40 TABLET, DELAYED RELEASE ORAL at 14:21

## 2017-06-23 RX ADMIN — SIMETHICONE: 20 SUSPENSION/ DROPS ORAL at 10:40

## 2017-06-23 RX ADMIN — GABAPENTIN 300 MG: 250 SOLUTION ORAL at 10:38

## 2017-06-23 RX ADMIN — POTASSIUM CHLORIDE: 2 INJECTION, SOLUTION, CONCENTRATE INTRAVENOUS at 19:44

## 2017-06-23 RX ADMIN — OXYCODONE HYDROCHLORIDE 15 MG: 5 SOLUTION ORAL at 21:43

## 2017-06-23 RX ADMIN — SODIUM CHLORIDE, PRESERVATIVE FREE 65 ML: 5 INJECTION INTRAVENOUS at 09:09

## 2017-06-23 RX ADMIN — ACETAMINOPHEN 975 MG: 325 TABLET, FILM COATED ORAL at 03:20

## 2017-06-23 RX ADMIN — LOPERAMIDE HYDROCHLORIDE 4 MG: 1 SOLUTION ORAL at 10:44

## 2017-06-23 RX ADMIN — Medication 10 ML: at 19:49

## 2017-06-23 RX ADMIN — HYDROCORTISONE: 10 CREAM TOPICAL at 10:43

## 2017-06-23 RX ADMIN — Medication 2 PACKET: at 10:37

## 2017-06-23 RX ADMIN — ACETAMINOPHEN 975 MG: 160 LIQUID ORAL at 14:21

## 2017-06-23 RX ADMIN — OXYCODONE HYDROCHLORIDE 15 MG: 5 SOLUTION ORAL at 14:20

## 2017-06-23 RX ADMIN — OXYCODONE HYDROCHLORIDE 10 MG: 5 SOLUTION ORAL at 06:56

## 2017-06-23 RX ADMIN — OXYCODONE HYDROCHLORIDE 10 MG: 5 SOLUTION ORAL at 10:28

## 2017-06-23 RX ADMIN — BACITRACIN ZINC: 500 OINTMENT TOPICAL at 19:53

## 2017-06-23 RX ADMIN — I.V. FAT EMULSION 180 ML: 20 EMULSION INTRAVENOUS at 19:44

## 2017-06-23 RX ADMIN — Medication 2 PACKET: at 19:53

## 2017-06-23 RX ADMIN — ENOXAPARIN SODIUM 40 MG: 40 INJECTION SUBCUTANEOUS at 12:31

## 2017-06-23 RX ADMIN — CHOLESTYRAMINE 4 G: 4 POWDER, FOR SUSPENSION ORAL at 12:31

## 2017-06-23 RX ADMIN — OXYCODONE HYDROCHLORIDE 10 MG: 5 SOLUTION ORAL at 17:36

## 2017-06-23 RX ADMIN — METOPROLOL TARTRATE 25 MG: 100 TABLET ORAL at 19:51

## 2017-06-23 RX ADMIN — FENTANYL 1 PATCH: 100 PATCH, EXTENDED RELEASE TRANSDERMAL at 17:36

## 2017-06-23 RX ADMIN — DIPHENHYDRAMINE HYDROCHLORIDE 25 MG: 12.5 SOLUTION ORAL at 21:43

## 2017-06-23 RX ADMIN — OXYCODONE HYDROCHLORIDE 10 MG: 5 SOLUTION ORAL at 03:21

## 2017-06-23 NOTE — PROGRESS NOTES
Focus:  Status  D:  Monitoring status  I:   Vitals taken        amb in room and BSC by self        Pt will let nurse know the way she wanted her med which takes a long time to give. Pt likes the med pushed very slowly to prevent abd discomfort.         CT this AM which caused some of the med to be given at a different time, see emar         No Peridex given, med not available and No Metoprolol given, BP low         Pharyngostomy irrigated with 25 cc NS/pt only allow that much. Pharyngostomy tube to LIS         1000 started TF at rated of 25 cc/h until 1200 then pt allow the pump up to 35 cc/h          Continued to be NPO           TPN AT 60 cc/h cont' and Lipids at 15 cc/h off at 0900          Lots of tiny soft/loose stools  A:    Denied resp distress/cp nor any signs observed  P:    Report to oncoming nurse

## 2017-06-23 NOTE — PROGRESS NOTES
"SPIRITUAL HEALTH SERVICES  SPIRITUAL ASSESSMENT Progress Note (Palliative Focus)  [facility] ([place]) U7B    REFERRAL SOURCE:  follow-up for continue care.     Minerva was waiting for her nurse during our visit. She shared, \"yes it has been a long time to be in the hospital.\"  Each morning that her  comes in they read and share in a devotional which brings Minerva comfort.  Minerva shared pictures of her two granddaughters and became tearful as she is very proud of them.  Minerva seemed to be a bit more reserved in holding back of her emotions about her journey in the hospital and her health issues.  Minerva pastoral  has come often to have a visit and as Minerva stated, \"he seems to always show up when needed not always sure how that happens.\"   I will inform the palliative  of care provided.    Tyler Odonnell  Chaplain Resident  Pager [phone]  "

## 2017-06-23 NOTE — PROGRESS NOTES
CLINICAL NUTRITION SERVICES - BRIEF NOTE  *see RD note from 6/21 for last nutrition reassessment details    - TF: per chart review and discussion in rounds, pt's stool is thickening and consistently tolerating TF @ 25 ml/hr x 16 hours which is providing 800 kcal and 34 g PRO. Of note, current goal is 35 mL/hr x 16 hours (ultimate goal when no longer on TPN is 50 mL/hr x 16 hours).    - Weight: 40-41 kg over the past week, trending up 1-2 kg over the week prior.  Will adjust dosing weight to 40 kg and reassess estimated nutrition needs below:    REASSESSED NUTRITION NEEDS  Estimated Energy Needs: 5326-0098 kcals/day (35 - 40+ kcals/kg for EN or EN+PN); 1222-3626 (30-35 kcal/kg for PN)  Justification: Post-op and Repletion; aim lower end if requires PN to prevent overfeeding  Estimated Protein Needs: 60-80+ grams protein/day (1.5 - 2+ grams of pro/kg)  Justification: Post-op and Repletion (would not recommend going above 2.5 g/kg PRO in total from TF + PN)    INTERVENTIONS  Implementation  Collaboration with other providers: discussed with pharmacy modifying IV lipids to 180 mL 20% 5x/week given improvements in TF tolerance and consistent intakes.  Will also adjust dosing weight to 40 kg.  New TPN/lipid regimen to provide 1078 kcal (27 kcal/kg), 1.6 g/kg PRO, GIR 2.9 mg/kg/min, and 24% kcal from fat    Monitoring/Evaluation  Progress toward goals will be monitored and evaluated per protocol.     Evie Montanez, ALEX, LD   [address]B RD Pager: [phone]

## 2017-06-23 NOTE — PLAN OF CARE
Problem: Goal Outcome Summary  Goal: Goal Outcome Summary  Outcome: No Change  /54 (BP Location: Left arm)  Pulse 102  Temp 96.1  F (35.6  C) (Oral)  Resp 18  Ht 1.524 m (5')  Wt 40.9 kg (90 lb 1.6 oz)  SpO2 100%  Breastfeeding? No  BMI 17.6 kg/m2     7284-8957: VSS, Afebrile.  Pt calm and cooperative overnight.  No complaints of nausea or SOB.  Pt tolerating oxycodone for adequate pain management. Up independently in room.  Loose stools through the night, pt self manages.  Chest incisions intact with scant output on dressing.  Pharyngostomy with adequate output.  Pt otherwise comfortable, continue with POC.

## 2017-06-23 NOTE — PLAN OF CARE
Problem: Goal Outcome Summary  Goal: Goal Outcome Summary  Outcome: Improving  VSS, afebrile. Pain controlled. Meds through j tube. NPO. Stitches to pharyngostomy tube dissolved. MD notified. New stitch placed. Pt up to commode ad renea. Loose watery stools continue. Will continue with POC.

## 2017-06-23 NOTE — PROGRESS NOTES
Thoracic surgery progress note    No acute events overnight.  No acute complaints.       Temp: 97  F (36.1  C) Temp  Min: 95.9  F (35.5  C)  Max: 97  F (36.1  C)  Resp: 18 Resp  Min: 16  Max: 18  SpO2: 98 % SpO2  Min: 97 %  Max: 100 %    No Data Recorded  Heart Rate: 78 Heart Rate  Min: 74  Max: 85  BP: 112/49 Systolic (24hrs), Av , Min:112 , Max:124   Diastolic (24hrs), Av, Min:49, Max:63    A&O, NAD  Pharyngostomy tube to LIS w/ gastric fluid output in canister  Chest wound with slightly more purulent fluid on packing and in wound, good granulation at base  Unlabored breathing on RA  Abd soft, non tender, non distended     I&O  Pharyngostomy 450  Mixed urine/stool 850   + unmeasured     A/P: 72F w/ chronic esophageal perforation s/p Nissen at OSH, now s/p esophagectomy with gastric pull-up c/b recurrent anastomotic leak and mediastinal abscess s/p serial washouts, pharyngostomy tube and most recently EGD w/ stent placement on .     - C/w Fentanyl patch (now at 112/hr) and Oxy PRN. plan to decrease fentanyl dose to 100mcg/hr on Saturday per palliative recs.  - Pharyngostomy to LIS.   - Strict NPO. C/w TPN and tube feeds @ goal 6 hours overnight, minimize tube feeding interruptions   - Chronic diarrhea - max doses of anti-motility agents. Cholestyramine. Fiber TID. Opium tincture changed to PRN, will reschedule if diarrhea gets worse  - Daily dressing changes  - CT today for slight increase in wound purulence  - Deja Rogers PA-C  P: [phone]

## 2017-06-23 NOTE — PROGRESS NOTES
Called to See patient:    Seen with Dr Lomeli    Loose anchoring stitch of pharyngostomy tube:    The tube was still in situ  Locally anaesthetized with lidocaine gel  Silk 2/0 used for anchoring stitch  Tube secured in its place  Patient tolerated the procedure well  No bleeding

## 2017-06-24 LAB — PLATELET # BLD AUTO: 467 10E9/L (ref 150–450)

## 2017-06-24 PROCEDURE — 25000132 ZZH RX MED GY IP 250 OP 250 PS 637: Mod: GY | Performed by: STUDENT IN AN ORGANIZED HEALTH CARE EDUCATION/TRAINING PROGRAM

## 2017-06-24 PROCEDURE — 12000003 ZZH R&B CRITICAL UMMC

## 2017-06-24 PROCEDURE — 25000125 ZZHC RX 250: Performed by: PHYSICIAN ASSISTANT

## 2017-06-24 PROCEDURE — 25000128 H RX IP 250 OP 636: Performed by: STUDENT IN AN ORGANIZED HEALTH CARE EDUCATION/TRAINING PROGRAM

## 2017-06-24 PROCEDURE — A9270 NON-COVERED ITEM OR SERVICE: HCPCS | Mod: GY | Performed by: PHYSICIAN ASSISTANT

## 2017-06-24 PROCEDURE — 25000132 ZZH RX MED GY IP 250 OP 250 PS 637: Mod: GY | Performed by: INTERNAL MEDICINE

## 2017-06-24 PROCEDURE — 85049 AUTOMATED PLATELET COUNT: CPT | Performed by: THORACIC SURGERY (CARDIOTHORACIC VASCULAR SURGERY)

## 2017-06-24 PROCEDURE — A9270 NON-COVERED ITEM OR SERVICE: HCPCS | Mod: GY | Performed by: INTERNAL MEDICINE

## 2017-06-24 PROCEDURE — 40000802 ZZH SITE CHECK

## 2017-06-24 PROCEDURE — A9270 NON-COVERED ITEM OR SERVICE: HCPCS | Mod: GY | Performed by: STUDENT IN AN ORGANIZED HEALTH CARE EDUCATION/TRAINING PROGRAM

## 2017-06-24 PROCEDURE — 36592 COLLECT BLOOD FROM PICC: CPT | Performed by: THORACIC SURGERY (CARDIOTHORACIC VASCULAR SURGERY)

## 2017-06-24 PROCEDURE — 25000132 ZZH RX MED GY IP 250 OP 250 PS 637: Mod: GY | Performed by: PHYSICIAN ASSISTANT

## 2017-06-24 PROCEDURE — A9270 NON-COVERED ITEM OR SERVICE: HCPCS | Mod: GY | Performed by: SURGERY

## 2017-06-24 PROCEDURE — 25000125 ZZHC RX 250

## 2017-06-24 PROCEDURE — 27210429 ZZH NUTRITION PRODUCT INTERMEDIATE LITER

## 2017-06-24 PROCEDURE — 25000132 ZZH RX MED GY IP 250 OP 250 PS 637: Mod: GY | Performed by: SURGERY

## 2017-06-24 RX ADMIN — HYDROCORTISONE: 10 CREAM TOPICAL at 20:01

## 2017-06-24 RX ADMIN — BACITRACIN ZINC: 500 OINTMENT TOPICAL at 08:53

## 2017-06-24 RX ADMIN — Medication 2 PACKET: at 14:07

## 2017-06-24 RX ADMIN — SIMETHICONE 40 MG: 20 SUSPENSION/ DROPS ORAL at 12:20

## 2017-06-24 RX ADMIN — MINERAL SUPPLEMENT IRON 300 MG / 5 ML STRENGTH LIQUID 100 PER BOX UNFLAVORED 300 MG: at 10:28

## 2017-06-24 RX ADMIN — Medication 10 ML: at 09:05

## 2017-06-24 RX ADMIN — GABAPENTIN 300 MG: 250 SOLUTION ORAL at 10:28

## 2017-06-24 RX ADMIN — OXYCODONE HYDROCHLORIDE 15 MG: 5 SOLUTION ORAL at 16:05

## 2017-06-24 RX ADMIN — OXYCODONE HYDROCHLORIDE 10 MG: 5 SOLUTION ORAL at 08:33

## 2017-06-24 RX ADMIN — LOPERAMIDE HYDROCHLORIDE 4 MG: 1 SOLUTION ORAL at 12:20

## 2017-06-24 RX ADMIN — GABAPENTIN 300 MG: 250 SOLUTION ORAL at 16:04

## 2017-06-24 RX ADMIN — OXYCODONE HYDROCHLORIDE 10 MG: 5 SOLUTION ORAL at 04:36

## 2017-06-24 RX ADMIN — CHOLESTYRAMINE 4 G: 4 POWDER, FOR SUSPENSION ORAL at 11:21

## 2017-06-24 RX ADMIN — Medication 10 ML: at 12:20

## 2017-06-24 RX ADMIN — TRAZODONE HYDROCHLORIDE 100 MG: 100 TABLET ORAL at 21:04

## 2017-06-24 RX ADMIN — CHLORHEXIDINE GLUCONATE 15 ML: 1.2 RINSE ORAL at 20:00

## 2017-06-24 RX ADMIN — SIMETHICONE 40 MG: 20 SUSPENSION/ DROPS ORAL at 08:52

## 2017-06-24 RX ADMIN — Medication 250 MG: at 08:52

## 2017-06-24 RX ADMIN — Medication 10 ML: at 16:05

## 2017-06-24 RX ADMIN — Medication 10 ML: at 19:44

## 2017-06-24 RX ADMIN — OXYCODONE HYDROCHLORIDE 15 MG: 5 SOLUTION ORAL at 21:04

## 2017-06-24 RX ADMIN — BACITRACIN ZINC: 500 OINTMENT TOPICAL at 20:01

## 2017-06-24 RX ADMIN — CHLORHEXIDINE GLUCONATE 15 ML: 1.2 RINSE ORAL at 10:28

## 2017-06-24 RX ADMIN — Medication 2 PACKET: at 09:32

## 2017-06-24 RX ADMIN — ACETAMINOPHEN 975 MG: 325 TABLET, FILM COATED ORAL at 08:52

## 2017-06-24 RX ADMIN — DIPHENHYDRAMINE HYDROCHLORIDE 25 MG: 12.5 SOLUTION ORAL at 21:05

## 2017-06-24 RX ADMIN — OXYCODONE HYDROCHLORIDE 15 MG: 5 SOLUTION ORAL at 12:19

## 2017-06-24 RX ADMIN — Medication 2 PACKET: at 19:00

## 2017-06-24 RX ADMIN — LOPERAMIDE HYDROCHLORIDE 4 MG: 1 SOLUTION ORAL at 19:44

## 2017-06-24 RX ADMIN — ENOXAPARIN SODIUM 40 MG: 40 INJECTION SUBCUTANEOUS at 12:20

## 2017-06-24 RX ADMIN — METOPROLOL TARTRATE 25 MG: 100 TABLET ORAL at 08:56

## 2017-06-24 RX ADMIN — ACETAMINOPHEN 975 MG: 325 TABLET, FILM COATED ORAL at 16:04

## 2017-06-24 RX ADMIN — POTASSIUM CHLORIDE: 2 INJECTION, SOLUTION, CONCENTRATE INTRAVENOUS at 19:55

## 2017-06-24 RX ADMIN — SIMETHICONE 40 MG: 20 SUSPENSION/ DROPS ORAL at 16:04

## 2017-06-24 RX ADMIN — Medication 250 MG: at 19:45

## 2017-06-24 RX ADMIN — SIMETHICONE 40 MG: 20 SUSPENSION/ DROPS ORAL at 19:44

## 2017-06-24 RX ADMIN — ACETAMINOPHEN 975 MG: 325 TABLET, FILM COATED ORAL at 01:09

## 2017-06-24 RX ADMIN — LOPERAMIDE HYDROCHLORIDE 4 MG: 1 SOLUTION ORAL at 16:05

## 2017-06-24 RX ADMIN — HYDROCORTISONE: 10 CREAM TOPICAL at 08:57

## 2017-06-24 RX ADMIN — METOPROLOL TARTRATE 25 MG: 100 TABLET ORAL at 19:44

## 2017-06-24 RX ADMIN — CHOLESTYRAMINE 4 G: 4 POWDER, FOR SUSPENSION ORAL at 20:08

## 2017-06-24 RX ADMIN — GABAPENTIN 600 MG: 250 SOLUTION ORAL at 21:04

## 2017-06-24 RX ADMIN — LOPERAMIDE HYDROCHLORIDE 4 MG: 1 SOLUTION ORAL at 08:53

## 2017-06-24 RX ADMIN — OXYCODONE HYDROCHLORIDE 10 MG: 5 SOLUTION ORAL at 01:09

## 2017-06-24 RX ADMIN — CHLORHEXIDINE GLUCONATE 15 ML: 1.2 RINSE ORAL at 14:06

## 2017-06-24 ASSESSMENT — PAIN DESCRIPTION - DESCRIPTORS: DESCRIPTORS: DISCOMFORT

## 2017-06-24 NOTE — PLAN OF CARE
Problem: Individualization  Goal: Patient Preferences  Outcome: No Change  Afeb, vitals stable, 02 sats 97% on room air, states pain is tolerable, oxycodone per j-tube x2 with relief, chest drsg dry and intact, pharyngostomy to liws, small amt out, flushed x1, pt only able to tolerate 15cc, j-tube capped, ANA M/IL infusing, up to commode per self, voiding in good amts, belly soft with positive bowel sounds, lungs clear, offers no c/o, appeared to rest/sleep between cares,

## 2017-06-24 NOTE — PLAN OF CARE
Problem: Goal Outcome Summary  Goal: Goal Outcome Summary  Outcome: No Change  Afebrile,VSS, Sats 95-98%RA. Oxycodone for pain. pharyngostomy tube to lis with greenish output.  Gtube with tube feedings at 35 ml/hr  Pt reduced to 15 ml/hr for an hour and it shut it off at 2230.  Voiding adequately, small loose stool. Picc in place TPN and lipids per order.  Ambulating on unit. Cont with POC

## 2017-06-24 NOTE — PLAN OF CARE
Problem: Goal Outcome Summary  Goal: Goal Outcome Summary  Outcome: No Change  Vitals:     06/23/17 2219 06/24/17 0100 06/24/17 0830 06/24/17 1702   BP: 129/59 116/50 132/66 121/68   BP Location: Left arm   Left arm Left arm   Cuff Size:           Pulse:           Resp: 18 18 18 18   Temp: 96.6  F (35.9  C)   96.6  F (35.9  C) 96.3  F (35.7  C)   TempSrc: Oral   Oral Oral   SpO2: 100% 97% 95% 97%   Weight:           Height:             AVSS, pt A&O x 4, pt A&O x 4, intermittent complaints of pain, PO oxycodone given x 3 with some relief. Pharyngostomy to LIS, irrigated x 1 with 15 ml NS, moderate amount of green drainage. TF running continuous at 25 ml/hr via J-tube. TPN running continuous at 60 ml/hr. Pt still complaining of frequent loose stools. Pt up ad renea, continuing to monitor per POC.

## 2017-06-25 PROCEDURE — 25000125 ZZHC RX 250

## 2017-06-25 PROCEDURE — 25000125 ZZHC RX 250: Performed by: PHYSICIAN ASSISTANT

## 2017-06-25 PROCEDURE — 25000128 H RX IP 250 OP 636: Performed by: STUDENT IN AN ORGANIZED HEALTH CARE EDUCATION/TRAINING PROGRAM

## 2017-06-25 PROCEDURE — A9270 NON-COVERED ITEM OR SERVICE: HCPCS | Mod: GY | Performed by: PHYSICIAN ASSISTANT

## 2017-06-25 PROCEDURE — A9270 NON-COVERED ITEM OR SERVICE: HCPCS | Mod: GY | Performed by: SURGERY

## 2017-06-25 PROCEDURE — 25000132 ZZH RX MED GY IP 250 OP 250 PS 637: Mod: GY | Performed by: INTERNAL MEDICINE

## 2017-06-25 PROCEDURE — A9270 NON-COVERED ITEM OR SERVICE: HCPCS | Mod: GY | Performed by: STUDENT IN AN ORGANIZED HEALTH CARE EDUCATION/TRAINING PROGRAM

## 2017-06-25 PROCEDURE — 27210429 ZZH NUTRITION PRODUCT INTERMEDIATE LITER

## 2017-06-25 PROCEDURE — 12000003 ZZH R&B CRITICAL UMMC

## 2017-06-25 PROCEDURE — A9270 NON-COVERED ITEM OR SERVICE: HCPCS | Mod: GY | Performed by: INTERNAL MEDICINE

## 2017-06-25 PROCEDURE — 25000132 ZZH RX MED GY IP 250 OP 250 PS 637: Mod: GY | Performed by: SURGERY

## 2017-06-25 PROCEDURE — 40000802 ZZH SITE CHECK

## 2017-06-25 PROCEDURE — 25000132 ZZH RX MED GY IP 250 OP 250 PS 637: Mod: GY | Performed by: STUDENT IN AN ORGANIZED HEALTH CARE EDUCATION/TRAINING PROGRAM

## 2017-06-25 PROCEDURE — 25000125 ZZHC RX 250: Performed by: SURGERY

## 2017-06-25 PROCEDURE — 25000132 ZZH RX MED GY IP 250 OP 250 PS 637: Mod: GY | Performed by: PHYSICIAN ASSISTANT

## 2017-06-25 RX ADMIN — GABAPENTIN 300 MG: 250 SOLUTION ORAL at 09:39

## 2017-06-25 RX ADMIN — METOPROLOL TARTRATE 25 MG: 100 TABLET ORAL at 19:40

## 2017-06-25 RX ADMIN — PANTOPRAZOLE SODIUM 40 MG: 40 TABLET, DELAYED RELEASE ORAL at 14:47

## 2017-06-25 RX ADMIN — Medication 2 PACKET: at 09:40

## 2017-06-25 RX ADMIN — SIMETHICONE 40 MG: 20 SUSPENSION/ DROPS ORAL at 11:55

## 2017-06-25 RX ADMIN — LOPERAMIDE HYDROCHLORIDE 4 MG: 1 SOLUTION ORAL at 16:59

## 2017-06-25 RX ADMIN — OXYCODONE HYDROCHLORIDE 15 MG: 5 SOLUTION ORAL at 14:47

## 2017-06-25 RX ADMIN — CHOLESTYRAMINE 4 G: 4 POWDER, FOR SUSPENSION ORAL at 20:40

## 2017-06-25 RX ADMIN — Medication 10 ML: at 16:57

## 2017-06-25 RX ADMIN — CHLORHEXIDINE GLUCONATE 15 ML: 1.2 RINSE ORAL at 14:47

## 2017-06-25 RX ADMIN — BACITRACIN ZINC: 500 OINTMENT TOPICAL at 08:43

## 2017-06-25 RX ADMIN — POTASSIUM CHLORIDE: 2 INJECTION, SOLUTION, CONCENTRATE INTRAVENOUS at 19:43

## 2017-06-25 RX ADMIN — CHLORHEXIDINE GLUCONATE 15 ML: 1.2 RINSE ORAL at 08:42

## 2017-06-25 RX ADMIN — OXYCODONE HYDROCHLORIDE 15 MG: 5 SOLUTION ORAL at 08:41

## 2017-06-25 RX ADMIN — OXYCODONE HYDROCHLORIDE 10 MG: 5 SOLUTION ORAL at 04:24

## 2017-06-25 RX ADMIN — ACETAMINOPHEN 975 MG: 325 TABLET, FILM COATED ORAL at 08:42

## 2017-06-25 RX ADMIN — ENOXAPARIN SODIUM 40 MG: 40 INJECTION SUBCUTANEOUS at 11:55

## 2017-06-25 RX ADMIN — ACETAMINOPHEN 975 MG: 325 TABLET, FILM COATED ORAL at 01:19

## 2017-06-25 RX ADMIN — CHOLESTYRAMINE 4 G: 4 POWDER, FOR SUSPENSION ORAL at 10:57

## 2017-06-25 RX ADMIN — TRAZODONE HYDROCHLORIDE 100 MG: 100 TABLET ORAL at 21:11

## 2017-06-25 RX ADMIN — LOPERAMIDE HYDROCHLORIDE 4 MG: 1 SOLUTION ORAL at 08:42

## 2017-06-25 RX ADMIN — GABAPENTIN 600 MG: 250 SOLUTION ORAL at 21:11

## 2017-06-25 RX ADMIN — Medication 250 MG: at 08:42

## 2017-06-25 RX ADMIN — Medication 2 PACKET: at 14:48

## 2017-06-25 RX ADMIN — DIPHENHYDRAMINE HYDROCHLORIDE 25 MG: 12.5 SOLUTION ORAL at 21:11

## 2017-06-25 RX ADMIN — SIMETHICONE 40 MG: 20 SUSPENSION/ DROPS ORAL at 08:42

## 2017-06-25 RX ADMIN — METOPROLOL TARTRATE 25 MG: 100 TABLET ORAL at 08:48

## 2017-06-25 RX ADMIN — LOPERAMIDE HYDROCHLORIDE 4 MG: 1 SOLUTION ORAL at 11:54

## 2017-06-25 RX ADMIN — Medication 6 MG: at 19:39

## 2017-06-25 RX ADMIN — OXYCODONE HYDROCHLORIDE 15 MG: 5 SOLUTION ORAL at 21:11

## 2017-06-25 RX ADMIN — Medication 250 MG: at 19:39

## 2017-06-25 RX ADMIN — OXYCODONE HYDROCHLORIDE 10 MG: 5 SOLUTION ORAL at 11:55

## 2017-06-25 RX ADMIN — SODIUM CHLORIDE, PRESERVATIVE FREE 5 ML: 5 INJECTION INTRAVENOUS at 17:02

## 2017-06-25 RX ADMIN — OXYCODONE HYDROCHLORIDE 10 MG: 5 SOLUTION ORAL at 01:18

## 2017-06-25 RX ADMIN — GABAPENTIN 300 MG: 250 SOLUTION ORAL at 17:02

## 2017-06-25 RX ADMIN — MINERAL SUPPLEMENT IRON 300 MG / 5 ML STRENGTH LIQUID 100 PER BOX UNFLAVORED 300 MG: at 08:42

## 2017-06-25 RX ADMIN — BACITRACIN ZINC: 500 OINTMENT TOPICAL at 19:44

## 2017-06-25 RX ADMIN — SIMETHICONE 40 MG: 20 SUSPENSION/ DROPS ORAL at 16:57

## 2017-06-25 RX ADMIN — CHLORHEXIDINE GLUCONATE 15 ML: 1.2 RINSE ORAL at 19:40

## 2017-06-25 RX ADMIN — Medication 10 ML: at 19:39

## 2017-06-25 RX ADMIN — OXYCODONE HYDROCHLORIDE 15 MG: 5 SOLUTION ORAL at 18:09

## 2017-06-25 RX ADMIN — LOPERAMIDE HYDROCHLORIDE 4 MG: 1 SOLUTION ORAL at 19:38

## 2017-06-25 RX ADMIN — SIMETHICONE 40 MG: 20 SUSPENSION/ DROPS ORAL at 19:40

## 2017-06-25 RX ADMIN — Medication 10 ML: at 08:42

## 2017-06-25 RX ADMIN — Medication 10 ML: at 11:54

## 2017-06-25 RX ADMIN — Medication 2 PACKET: at 19:43

## 2017-06-25 RX ADMIN — ACETAMINOPHEN 975 MG: 325 TABLET, FILM COATED ORAL at 16:58

## 2017-06-25 ASSESSMENT — PAIN DESCRIPTION - DESCRIPTORS: DESCRIPTORS: CONSTANT;DULL

## 2017-06-25 NOTE — PLAN OF CARE
Problem: Goal Outcome Summary  Goal: Goal Outcome Summary  Outcome: No Change  5851-6116. Dressings CDI. TF running at 15ml/hr until pt turned off at about 2130. meds thru J tube. TPN at 60ml/hr. pharyngostomy tube to LIS, greenish output. Prn oxycodone. Loose stools continue. Up ad renea.

## 2017-06-25 NOTE — PLAN OF CARE
Problem: Goal Outcome Summary  Goal: Goal Outcome Summary  Outcome: No Change   Resumed care from 1530 to 1930 :Pt. is alert and oriented ,up independently to the bed commode ,still has frequent loose stools,adequate urinary output,abdominal/chest and neck pain controled with oxycodone 15 mg given one time per feeding tube.LS clear,BS hyperactive, two small chest dressings are clean and dry,abdominal dressing is clean and dry.VSS,TPN infusing at 60 cc/hr tube feeding at 15 cc/hr.

## 2017-06-25 NOTE — PLAN OF CARE
Problem: Individualization  Goal: Patient Preferences  Outcome: No Change  afeb vitals stable, 02 sats % on room air, oxycodone via feeding tube q3-4 hours with relief, chest drsgs dry and intact, pharyngostomy to liws, small output, irrigates easily, pt only able to tolerate 15cc, belly flat with positive bowel sounds, gas and diarrhea, lungs clear, up to commode voiding in good amts, ANA M infusing, j-tube capped, appeared to rest/sleep between cares, offers no further c/o

## 2017-06-25 NOTE — PLAN OF CARE
Problem: Goal Outcome Summary  Goal: Goal Outcome Summary  Outcome: No Change  Vitals:     06/24/17 2207 06/25/17 0000 06/25/17 0100 06/25/17 0750   BP: 125/56     120/49   BP Location: Left arm     Left arm   Cuff Size:           Pulse:           Resp: 18 18   18   Temp: 95.9  F (35.5  C)     97.1  F (36.2  C)   TempSrc: Oral     Oral   SpO2: 100%   97% 96%   Weight:           Height:             AVSS, pt A&O x 4, intermittent complaints of pain, Oxycodone given x 3 via J-tube with some relief. Pharyngostomy to LIS, irrigated x 1 with 15 ml NS, moderate amount of green drainage, drain cares done. Chest dressing CDI. TF running continuous at 25ml/hr. Pt still having frequent loose stools. TPN running continuous at 60 ml/hr. Pt up ad renea. Voiding adequate. Continuing to monitor per POC.

## 2017-06-26 ENCOUNTER — ANESTHESIA EVENT (OUTPATIENT)
Dept: SURGERY | Facility: CLINIC | Age: 72
DRG: 856 | End: 2017-06-26
Payer: MEDICARE

## 2017-06-26 LAB
ALBUMIN SERPL-MCNC: 2.6 G/DL (ref 3.4–5)
ALP SERPL-CCNC: 322 U/L (ref 40–150)
ALT SERPL W P-5'-P-CCNC: 26 U/L (ref 0–50)
ANION GAP SERPL CALCULATED.3IONS-SCNC: 4 MMOL/L (ref 3–14)
AST SERPL W P-5'-P-CCNC: 18 U/L (ref 0–45)
BILIRUB SERPL-MCNC: 0.7 MG/DL (ref 0.2–1.3)
BUN SERPL-MCNC: 22 MG/DL (ref 7–30)
CALCIUM SERPL-MCNC: 8.6 MG/DL (ref 8.5–10.1)
CHLORIDE SERPL-SCNC: 103 MMOL/L (ref 94–109)
CO2 SERPL-SCNC: 30 MMOL/L (ref 20–32)
CREAT SERPL-MCNC: 0.37 MG/DL (ref 0.52–1.04)
ERYTHROCYTE [DISTWIDTH] IN BLOOD BY AUTOMATED COUNT: 16.1 % (ref 10–15)
GFR SERPL CREATININE-BSD FRML MDRD: ABNORMAL ML/MIN/1.7M2
GLUCOSE SERPL-MCNC: 89 MG/DL (ref 70–99)
HCT VFR BLD AUTO: 29.1 % (ref 35–47)
HGB BLD-MCNC: 8.7 G/DL (ref 11.7–15.7)
INR PPP: 1.12 (ref 0.86–1.14)
MAGNESIUM SERPL-MCNC: 1.9 MG/DL (ref 1.6–2.3)
MCH RBC QN AUTO: 28.4 PG (ref 26.5–33)
MCHC RBC AUTO-ENTMCNC: 29.9 G/DL (ref 31.5–36.5)
MCV RBC AUTO: 95 FL (ref 78–100)
PHOSPHATE SERPL-MCNC: 3.7 MG/DL (ref 2.5–4.5)
PLATELET # BLD AUTO: 400 10E9/L (ref 150–450)
POTASSIUM SERPL-SCNC: 4 MMOL/L (ref 3.4–5.3)
PREALB SERPL IA-MCNC: 21 MG/DL (ref 15–45)
PROT SERPL-MCNC: 7 G/DL (ref 6.8–8.8)
RBC # BLD AUTO: 3.06 10E12/L (ref 3.8–5.2)
SODIUM SERPL-SCNC: 136 MMOL/L (ref 133–144)
TRIGL SERPL-MCNC: 65 MG/DL
WBC # BLD AUTO: 6.9 10E9/L (ref 4–11)

## 2017-06-26 PROCEDURE — 84134 ASSAY OF PREALBUMIN: CPT | Performed by: STUDENT IN AN ORGANIZED HEALTH CARE EDUCATION/TRAINING PROGRAM

## 2017-06-26 PROCEDURE — 83735 ASSAY OF MAGNESIUM: CPT | Performed by: STUDENT IN AN ORGANIZED HEALTH CARE EDUCATION/TRAINING PROGRAM

## 2017-06-26 PROCEDURE — A9270 NON-COVERED ITEM OR SERVICE: HCPCS | Mod: GY | Performed by: PHYSICIAN ASSISTANT

## 2017-06-26 PROCEDURE — 25000125 ZZHC RX 250: Performed by: STUDENT IN AN ORGANIZED HEALTH CARE EDUCATION/TRAINING PROGRAM

## 2017-06-26 PROCEDURE — 84100 ASSAY OF PHOSPHORUS: CPT | Performed by: STUDENT IN AN ORGANIZED HEALTH CARE EDUCATION/TRAINING PROGRAM

## 2017-06-26 PROCEDURE — A9270 NON-COVERED ITEM OR SERVICE: HCPCS | Mod: GY | Performed by: STUDENT IN AN ORGANIZED HEALTH CARE EDUCATION/TRAINING PROGRAM

## 2017-06-26 PROCEDURE — 25000125 ZZHC RX 250: Performed by: PHYSICIAN ASSISTANT

## 2017-06-26 PROCEDURE — 80053 COMPREHEN METABOLIC PANEL: CPT | Performed by: STUDENT IN AN ORGANIZED HEALTH CARE EDUCATION/TRAINING PROGRAM

## 2017-06-26 PROCEDURE — A9270 NON-COVERED ITEM OR SERVICE: HCPCS | Mod: GY | Performed by: SURGERY

## 2017-06-26 PROCEDURE — 84478 ASSAY OF TRIGLYCERIDES: CPT | Performed by: STUDENT IN AN ORGANIZED HEALTH CARE EDUCATION/TRAINING PROGRAM

## 2017-06-26 PROCEDURE — 27210429 ZZH NUTRITION PRODUCT INTERMEDIATE LITER

## 2017-06-26 PROCEDURE — A9270 NON-COVERED ITEM OR SERVICE: HCPCS | Mod: GY | Performed by: INTERNAL MEDICINE

## 2017-06-26 PROCEDURE — 40000141 ZZH STATISTIC PERIPHERAL IV START W/O US GUIDANCE

## 2017-06-26 PROCEDURE — 25000125 ZZHC RX 250

## 2017-06-26 PROCEDURE — 25000132 ZZH RX MED GY IP 250 OP 250 PS 637: Mod: GY | Performed by: INTERNAL MEDICINE

## 2017-06-26 PROCEDURE — 25000125 ZZHC RX 250: Performed by: SURGERY

## 2017-06-26 PROCEDURE — 25000128 H RX IP 250 OP 636: Performed by: STUDENT IN AN ORGANIZED HEALTH CARE EDUCATION/TRAINING PROGRAM

## 2017-06-26 PROCEDURE — 36592 COLLECT BLOOD FROM PICC: CPT | Performed by: STUDENT IN AN ORGANIZED HEALTH CARE EDUCATION/TRAINING PROGRAM

## 2017-06-26 PROCEDURE — 85027 COMPLETE CBC AUTOMATED: CPT | Performed by: STUDENT IN AN ORGANIZED HEALTH CARE EDUCATION/TRAINING PROGRAM

## 2017-06-26 PROCEDURE — 25000132 ZZH RX MED GY IP 250 OP 250 PS 637: Mod: GY | Performed by: STUDENT IN AN ORGANIZED HEALTH CARE EDUCATION/TRAINING PROGRAM

## 2017-06-26 PROCEDURE — 40000802 ZZH SITE CHECK

## 2017-06-26 PROCEDURE — 12000003 ZZH R&B CRITICAL UMMC

## 2017-06-26 PROCEDURE — 85610 PROTHROMBIN TIME: CPT | Performed by: STUDENT IN AN ORGANIZED HEALTH CARE EDUCATION/TRAINING PROGRAM

## 2017-06-26 PROCEDURE — 25000132 ZZH RX MED GY IP 250 OP 250 PS 637: Mod: GY | Performed by: PHYSICIAN ASSISTANT

## 2017-06-26 PROCEDURE — 25000132 ZZH RX MED GY IP 250 OP 250 PS 637: Mod: GY | Performed by: SURGERY

## 2017-06-26 RX ADMIN — LOPERAMIDE HYDROCHLORIDE 4 MG: 1 SOLUTION ORAL at 08:40

## 2017-06-26 RX ADMIN — GABAPENTIN 300 MG: 250 SOLUTION ORAL at 12:02

## 2017-06-26 RX ADMIN — OXYCODONE HYDROCHLORIDE 10 MG: 5 SOLUTION ORAL at 01:50

## 2017-06-26 RX ADMIN — LOPERAMIDE HYDROCHLORIDE 4 MG: 1 SOLUTION ORAL at 20:43

## 2017-06-26 RX ADMIN — Medication 2 PACKET: at 09:58

## 2017-06-26 RX ADMIN — SIMETHICONE 40 MG: 20 SUSPENSION/ DROPS ORAL at 21:07

## 2017-06-26 RX ADMIN — OXYCODONE HYDROCHLORIDE 15 MG: 5 SOLUTION ORAL at 12:01

## 2017-06-26 RX ADMIN — Medication 10 ML: at 12:01

## 2017-06-26 RX ADMIN — Medication 10 ML: at 15:45

## 2017-06-26 RX ADMIN — Medication 2 PACKET: at 20:49

## 2017-06-26 RX ADMIN — Medication 2 G: at 11:01

## 2017-06-26 RX ADMIN — CHLORHEXIDINE GLUCONATE 15 ML: 1.2 RINSE ORAL at 20:43

## 2017-06-26 RX ADMIN — OXYCODONE HYDROCHLORIDE 10 MG: 5 SOLUTION ORAL at 05:21

## 2017-06-26 RX ADMIN — OXYCODONE HYDROCHLORIDE 15 MG: 5 SOLUTION ORAL at 18:08

## 2017-06-26 RX ADMIN — POTASSIUM CHLORIDE: 2 INJECTION, SOLUTION, CONCENTRATE INTRAVENOUS at 20:02

## 2017-06-26 RX ADMIN — OXYCODONE HYDROCHLORIDE 15 MG: 5 SOLUTION ORAL at 21:07

## 2017-06-26 RX ADMIN — BACITRACIN ZINC: 500 OINTMENT TOPICAL at 20:57

## 2017-06-26 RX ADMIN — SIMETHICONE 40 MG: 20 SUSPENSION/ DROPS ORAL at 08:41

## 2017-06-26 RX ADMIN — SODIUM CHLORIDE, PRESERVATIVE FREE 5 ML: 5 INJECTION INTRAVENOUS at 06:53

## 2017-06-26 RX ADMIN — METOPROLOL TARTRATE 25 MG: 100 TABLET ORAL at 08:40

## 2017-06-26 RX ADMIN — FENTANYL 1 PATCH: 100 PATCH, EXTENDED RELEASE TRANSDERMAL at 15:45

## 2017-06-26 RX ADMIN — OXYCODONE HYDROCHLORIDE 10 MG: 5 SOLUTION ORAL at 08:40

## 2017-06-26 RX ADMIN — I.V. FAT EMULSION 180 ML: 20 EMULSION INTRAVENOUS at 20:02

## 2017-06-26 RX ADMIN — Medication 250 MG: at 08:40

## 2017-06-26 RX ADMIN — Medication 6 MG: at 01:50

## 2017-06-26 RX ADMIN — HYDROCORTISONE: 10 CREAM TOPICAL at 20:49

## 2017-06-26 RX ADMIN — GABAPENTIN 300 MG: 250 SOLUTION ORAL at 17:08

## 2017-06-26 RX ADMIN — Medication 6 MG: at 21:08

## 2017-06-26 RX ADMIN — ACETAMINOPHEN 975 MG: 325 TABLET, FILM COATED ORAL at 08:40

## 2017-06-26 RX ADMIN — BACITRACIN ZINC: 500 OINTMENT TOPICAL at 08:41

## 2017-06-26 RX ADMIN — CHOLESTYRAMINE 4 G: 4 POWDER, FOR SUSPENSION ORAL at 19:18

## 2017-06-26 RX ADMIN — Medication 6 MG: at 08:48

## 2017-06-26 RX ADMIN — GABAPENTIN 600 MG: 250 SOLUTION ORAL at 21:08

## 2017-06-26 RX ADMIN — MINERAL SUPPLEMENT IRON 300 MG / 5 ML STRENGTH LIQUID 100 PER BOX UNFLAVORED 300 MG: at 08:40

## 2017-06-26 RX ADMIN — SODIUM CHLORIDE, PRESERVATIVE FREE 5 ML: 5 INJECTION INTRAVENOUS at 15:46

## 2017-06-26 RX ADMIN — ENOXAPARIN SODIUM 40 MG: 40 INJECTION SUBCUTANEOUS at 12:01

## 2017-06-26 RX ADMIN — LOPERAMIDE HYDROCHLORIDE 4 MG: 1 SOLUTION ORAL at 15:50

## 2017-06-26 RX ADMIN — TRAZODONE HYDROCHLORIDE 100 MG: 100 TABLET ORAL at 21:08

## 2017-06-26 RX ADMIN — Medication 2 PACKET: at 13:58

## 2017-06-26 RX ADMIN — ACETAMINOPHEN 975 MG: 325 TABLET, FILM COATED ORAL at 15:45

## 2017-06-26 RX ADMIN — ACETAMINOPHEN 975 MG: 325 TABLET, FILM COATED ORAL at 01:50

## 2017-06-26 RX ADMIN — LOPERAMIDE HYDROCHLORIDE 4 MG: 1 SOLUTION ORAL at 12:02

## 2017-06-26 RX ADMIN — CHOLESTYRAMINE 4 G: 4 POWDER, FOR SUSPENSION ORAL at 11:01

## 2017-06-26 RX ADMIN — Medication 6 MG: at 14:56

## 2017-06-26 RX ADMIN — OXYCODONE HYDROCHLORIDE 15 MG: 5 SOLUTION ORAL at 14:56

## 2017-06-26 RX ADMIN — PANTOPRAZOLE SODIUM 40 MG: 40 TABLET, DELAYED RELEASE ORAL at 13:58

## 2017-06-26 RX ADMIN — CHLORHEXIDINE GLUCONATE 15 ML: 1.2 RINSE ORAL at 08:40

## 2017-06-26 RX ADMIN — SIMETHICONE 40 MG: 20 SUSPENSION/ DROPS ORAL at 12:01

## 2017-06-26 RX ADMIN — SIMETHICONE 40 MG: 20 SUSPENSION/ DROPS ORAL at 15:51

## 2017-06-26 RX ADMIN — Medication 10 ML: at 20:43

## 2017-06-26 RX ADMIN — Medication 10 ML: at 08:40

## 2017-06-26 RX ADMIN — Medication 250 MG: at 20:43

## 2017-06-26 RX ADMIN — CHLORHEXIDINE GLUCONATE 15 ML: 1.2 RINSE ORAL at 13:57

## 2017-06-26 ASSESSMENT — PAIN DESCRIPTION - DESCRIPTORS: DESCRIPTORS: SORE

## 2017-06-26 ASSESSMENT — LIFESTYLE VARIABLES: TOBACCO_USE: 1

## 2017-06-26 ASSESSMENT — ENCOUNTER SYMPTOMS: DYSRHYTHMIAS: 1

## 2017-06-26 NOTE — PROGRESS NOTES
Thoracic Surgery Progress Note    No acute events overnight. Tolerated TFs. Diarrhea mildly increased. Pain controlled.     /69 (BP Location: Left arm)  Pulse 102  Temp 97  F (36.1  C) (Oral)  Resp 16  Ht 1.524 m (5')  Wt 40.5 kg (89 lb 4.8 oz)  SpO2 97%  Breastfeeding? No  BMI 17.44 kg/m2  Laying comfortably in bed in no acute distress  Awake, alert and appropriate  Non-labored breathing on room air  Pharyngostomy tube to LIWS with gastric fluid, site is clean and dry.   Abdomen is soft not distended or tender  Chest wounds healing. Superior wound with granulation tissue, no erythema or surrounding edema or drainage. Inferior incision with grannulation tissue at base, ~3 cc of purulent drainage in wound with dressing changes.     CT scan reviewed.   I/Os reviewed.     72 female with esophageal perforation status post Nissen at OSH now status post an esophagectomy with gastric pull up which was complicated by an anastomotic leak and mediastinal abscess. Status post serial washouts, esophageal stent placement and pharyngostomy tube on 6/4, healing well.     -OR tomorrow for EGD and stent exchange  Appreciate palliative assistance with pain management  Continue pharyngostomy tube to suction  Continue strict NPO with tube feeds cycled off for 6 hours at night, please minimize disruptions to feeds.   Continue anti-motility agents for diarrhea, fiber, cholestyramine.   Daily dressing changes  Continue DVT/GI prophylaxis    Saul Rogers PA-C  P: [phone]

## 2017-06-26 NOTE — PROGRESS NOTES
Social Work Services Progress Note    Hospital Day: 40  Date of Initial Social Work Evaluation:  6/14/17- please see for details  Collaborated with:  Chart review, team rounds, Thoracic team,  TCU admissions (Cynthia *[id number])    Data:  Pt is being followed for TCU placement and has been accepted at  TCU when medically ready.   Received update from Thoracic team that pt is going to OR tomorrow for scope and potential stent exchange and team anticipates pt will have the same cares post procedure. Thoracic team anticipates pt could be ready for d/c this week but specific date is unknown.     Intervention:  Ovi provided update to Cynthia with  TCU admissions and she continues to follow pt.     Assessment:  TCU, see bedside RN, PT/OT, medical team notes    Plan:    Anticipated Disposition:  Facility:  Accepted at  TC    Barriers to d/c plan:  Medical readiness, OR Tuesday    Follow Up:  ovi GRIMES will continue to follow for d/c planning    BALJIT Oden, MSW  7B   [phone] (pager) [phone]  6/26/2017

## 2017-06-26 NOTE — PLAN OF CARE
Problem: Individualization  Goal: Patient Preferences  Outcome: No Change  Afeb, vitals stable, 02 sats 98% on room air, c/o abd pain, oxycodone x2 with relief, also on scheduled tylenol, belly flat, soft with positive bowel sounds, gas and stool, tinc of opium x1 for diarrhea, chest drsg dry and intact, pharyngostomy to liws, scant out since midnight, irrigates easily, pt only tolerated 11cc NS, lungs clear, j-tube clamped except for meds, voiding on commode, appeared to rest/sleep between cares, offers no further c/o

## 2017-06-26 NOTE — ANESTHESIA POSTPROCEDURE EVALUATION
Patient: Minerva Blanco    Procedure(s):  Chest wound Washout and Dressing Change - Wound Class: I-Clean    Diagnosis:Non healing wound   Diagnosis Additional Information: No value filed.    Anesthesia Type:  MAC    Note:  Anesthesia Post Evaluation    Patient location during evaluation: PACU  Patient participation: Able to fully participate in evaluation  Level of consciousness: awake and alert  Pain management: adequate  Airway patency: patent  Cardiovascular status: blood pressure returned to baseline and hemodynamically stable  Respiratory status: nasal cannula  Hydration status: euvolemic  PONV: none     Anesthetic complications: None          Last vitals:  Vitals:    06/26/17 0000 06/26/17 0143 06/26/17 0747   BP:  109/63 135/69   Pulse:      Resp: 18 16 16   Temp:  35.9  C (96.7  F) 36.1  C (97  F)   SpO2:  98% 97%         Electronically Signed By: Dennys Waite MD  June 26, 2017  9:06 AM

## 2017-06-26 NOTE — ANESTHESIA PREPROCEDURE EVALUATION
Anesthesia Evaluation     . Pt has had prior anesthetic. Type: General    No history of anesthetic complications          ROS/MED HX    ENT/Pulmonary:     (+)tobacco use, Past use , . .    Neurologic:     (+)Multiple Sclerosis     Cardiovascular:     (+) ----. : . . . :. dysrhythmias a-fib, . Previous cardiac testing Echodate:1/2017results:Hyperdynamic right and left ventricles. Normal wall motion and normal valves. Mild Pulmonary HTNdate: results:ECG reviewed date:4/2017 results:ST= 114. Non-specific S-T changes date: results:          METS/Exercise Tolerance:     Hematologic: Comments: Hgb= 7.9    (+) Anemia, History of Transfusion no previous transfusion reaction -      Musculoskeletal: Comment: Fibromyalgia  (+) arthritis, , , -       GI/Hepatic:     (+) GERD hiatal hernia, Other GI/Hepatic S/P Esophagectomy complicated by anastomosis leak and Mediastinal abscess      Renal/Genitourinary:  - ROS Renal section negative       Endo:  - neg endo ROS       Psychiatric:  - neg psychiatric ROS       Infectious Disease:         Malignancy:   (+) Malignancy History of Breast  Breast CA Remission status post Surgery, Chemo and Radiation.         Other:    (+) H/O Chronic Pain,H/O chronic opiod use ,                    Physical Exam  Normal systems: pulmonary and dental    Airway   Mallampati: III  TM distance: >3 FB  Neck ROM: limited    Dental     Cardiovascular   Rhythm and rate: regular and normal      Pulmonary                     Anesthesia Plan      History & Physical Review  History and physical reviewed and following examination; no interval change.    ASA Status:  3 .    NPO Status:  > 8 hours    Plan for General and RSI with Intravenous induction. Maintenance will be Inhalation.    PONV prophylaxis:  Ondansetron (or other 5HT-3) and Other (See comment)  Additional equipment: Videolaryngoscope and Arterial Line (non invasive arterial monitoring)      Postoperative Care  Postoperative pain management:  IV  analgesics and Multi-modal analgesia.      Consents  Anesthetic plan, risks, benefits and alternatives discussed with:  Patient..

## 2017-06-26 NOTE — PLAN OF CARE
Problem: Goal Outcome Summary  Goal: Goal Outcome Summary  Outcome: No Change  Vitals:     06/25/17 1941 06/26/17 0000 06/26/17 0143 06/26/17 0747   BP: 114/58   109/63 135/69   BP Location: Left arm   Left arm Left arm   Cuff Size:           Pulse:           Resp:   18 16 16   Temp:     96.7  F (35.9  C) 97  F (36.1  C)   TempSrc:     Oral Oral   SpO2: 99%   98% 97%   Weight:           Height:             AVSS, pt A&O x 4, intermittent complaints of pain, PO oxycodone given x 3 with some relief. Opium tincture given x 1 per patient request this am. Pharyngostomy to LIS, irrigated x 1, drain care done. Tube feeds running at 25 ml/hr via J-tube. Chest dressings changed this am via MD, CDI with no drainage. TPN running continuous at 60 ml/hr. Magnesium replaced per protocol. Pt still having frequent loose stools, voiding adequate. Up ad renea. Continuing to monitor per POC.

## 2017-06-26 NOTE — PLAN OF CARE
Problem: Goal Outcome Summary  Goal: Goal Outcome Summary  Outcome: No Change  [id number]. VSS. Pharyngostomy tube to LIS, green output. Irrigated last at 2130 with 30cc. Site cares done. Chest dressing CDI. TPN at 60ml/hr to PICC. TF off for the night, meds thru J tube. Frequent loose stools, pt requested prn dose of opium. Prn oxycodone. Up ind in room.

## 2017-06-27 ENCOUNTER — ANESTHESIA (OUTPATIENT)
Dept: SURGERY | Facility: CLINIC | Age: 72
DRG: 856 | End: 2017-06-27
Payer: MEDICARE

## 2017-06-27 ENCOUNTER — APPOINTMENT (OUTPATIENT)
Dept: GENERAL RADIOLOGY | Facility: CLINIC | Age: 72
DRG: 856 | End: 2017-06-27
Attending: STUDENT IN AN ORGANIZED HEALTH CARE EDUCATION/TRAINING PROGRAM
Payer: MEDICARE

## 2017-06-27 LAB
ABO + RH BLD: NORMAL
ABO + RH BLD: NORMAL
BLD GP AB SCN SERPL QL: NORMAL
BLOOD BANK CMNT PATIENT-IMP: NORMAL
PLATELET # BLD AUTO: 402 10E9/L (ref 150–450)
SPECIMEN EXP DATE BLD: NORMAL

## 2017-06-27 PROCEDURE — 25000128 H RX IP 250 OP 636: Performed by: ANESTHESIOLOGY

## 2017-06-27 PROCEDURE — 37000008 ZZH ANESTHESIA TECHNICAL FEE, 1ST 30 MIN: Performed by: STUDENT IN AN ORGANIZED HEALTH CARE EDUCATION/TRAINING PROGRAM

## 2017-06-27 PROCEDURE — 25000128 H RX IP 250 OP 636: Performed by: NURSE ANESTHETIST, CERTIFIED REGISTERED

## 2017-06-27 PROCEDURE — A9270 NON-COVERED ITEM OR SERVICE: HCPCS | Mod: GY | Performed by: INTERNAL MEDICINE

## 2017-06-27 PROCEDURE — 25000132 ZZH RX MED GY IP 250 OP 250 PS 637: Mod: GY | Performed by: INTERNAL MEDICINE

## 2017-06-27 PROCEDURE — 25000132 ZZH RX MED GY IP 250 OP 250 PS 637: Mod: GY | Performed by: PHYSICIAN ASSISTANT

## 2017-06-27 PROCEDURE — C9399 UNCLASSIFIED DRUGS OR BIOLOG: HCPCS | Performed by: NURSE ANESTHETIST, CERTIFIED REGISTERED

## 2017-06-27 PROCEDURE — 0DP58DZ REMOVAL OF INTRALUMINAL DEVICE FROM ESOPHAGUS, VIA NATURAL OR ARTIFICIAL OPENING ENDOSCOPIC: ICD-10-PCS | Performed by: STUDENT IN AN ORGANIZED HEALTH CARE EDUCATION/TRAINING PROGRAM

## 2017-06-27 PROCEDURE — 25000132 ZZH RX MED GY IP 250 OP 250 PS 637: Mod: GY | Performed by: STUDENT IN AN ORGANIZED HEALTH CARE EDUCATION/TRAINING PROGRAM

## 2017-06-27 PROCEDURE — 86901 BLOOD TYPING SEROLOGIC RH(D): CPT | Performed by: ANESTHESIOLOGY

## 2017-06-27 PROCEDURE — A9270 NON-COVERED ITEM OR SERVICE: HCPCS | Mod: GY | Performed by: PHYSICIAN ASSISTANT

## 2017-06-27 PROCEDURE — A9270 NON-COVERED ITEM OR SERVICE: HCPCS | Mod: GY | Performed by: STUDENT IN AN ORGANIZED HEALTH CARE EDUCATION/TRAINING PROGRAM

## 2017-06-27 PROCEDURE — 25000566 ZZH SEVOFLURANE, EA 15 MIN: Performed by: STUDENT IN AN ORGANIZED HEALTH CARE EDUCATION/TRAINING PROGRAM

## 2017-06-27 PROCEDURE — 40000065 ZZH STATISTIC EKG NON-CHARGEABLE

## 2017-06-27 PROCEDURE — 40000277 XR SURGERY CARM FLUORO LESS THAN 5 MIN W STILLS: Mod: TC

## 2017-06-27 PROCEDURE — 12000003 ZZH R&B CRITICAL UMMC

## 2017-06-27 PROCEDURE — 25000125 ZZHC RX 250: Performed by: NURSE ANESTHETIST, CERTIFIED REGISTERED

## 2017-06-27 PROCEDURE — 40000170 ZZH STATISTIC PRE-PROCEDURE ASSESSMENT II: Performed by: STUDENT IN AN ORGANIZED HEALTH CARE EDUCATION/TRAINING PROGRAM

## 2017-06-27 PROCEDURE — 0D758DZ DILATION OF ESOPHAGUS WITH INTRALUMINAL DEVICE, VIA NATURAL OR ARTIFICIAL OPENING ENDOSCOPIC: ICD-10-PCS | Performed by: STUDENT IN AN ORGANIZED HEALTH CARE EDUCATION/TRAINING PROGRAM

## 2017-06-27 PROCEDURE — C1769 GUIDE WIRE: HCPCS | Performed by: STUDENT IN AN ORGANIZED HEALTH CARE EDUCATION/TRAINING PROGRAM

## 2017-06-27 PROCEDURE — 85049 AUTOMATED PLATELET COUNT: CPT | Performed by: THORACIC SURGERY (CARDIOTHORACIC VASCULAR SURGERY)

## 2017-06-27 PROCEDURE — 36000053 ZZH SURGERY LEVEL 2 EA 15 ADDTL MIN - UMMC: Performed by: STUDENT IN AN ORGANIZED HEALTH CARE EDUCATION/TRAINING PROGRAM

## 2017-06-27 PROCEDURE — 27210794 ZZH OR GENERAL SUPPLY STERILE: Performed by: STUDENT IN AN ORGANIZED HEALTH CARE EDUCATION/TRAINING PROGRAM

## 2017-06-27 PROCEDURE — 37000009 ZZH ANESTHESIA TECHNICAL FEE, EACH ADDTL 15 MIN: Performed by: STUDENT IN AN ORGANIZED HEALTH CARE EDUCATION/TRAINING PROGRAM

## 2017-06-27 PROCEDURE — 27210429 ZZH NUTRITION PRODUCT INTERMEDIATE LITER

## 2017-06-27 PROCEDURE — 0W28X0Z CHANGE DRAINAGE DEVICE IN CHEST WALL, EXTERNAL APPROACH: ICD-10-PCS | Performed by: STUDENT IN AN ORGANIZED HEALTH CARE EDUCATION/TRAINING PROGRAM

## 2017-06-27 PROCEDURE — 25000125 ZZHC RX 250: Performed by: STUDENT IN AN ORGANIZED HEALTH CARE EDUCATION/TRAINING PROGRAM

## 2017-06-27 PROCEDURE — 93010 ELECTROCARDIOGRAM REPORT: CPT | Performed by: INTERNAL MEDICINE

## 2017-06-27 PROCEDURE — C1874 STENT, COATED/COV W/DEL SYS: HCPCS | Performed by: STUDENT IN AN ORGANIZED HEALTH CARE EDUCATION/TRAINING PROGRAM

## 2017-06-27 PROCEDURE — 25000125 ZZHC RX 250: Performed by: SURGERY

## 2017-06-27 PROCEDURE — 86900 BLOOD TYPING SEROLOGIC ABO: CPT | Performed by: ANESTHESIOLOGY

## 2017-06-27 PROCEDURE — A9270 NON-COVERED ITEM OR SERVICE: HCPCS | Mod: GY | Performed by: SURGERY

## 2017-06-27 PROCEDURE — 71000014 ZZH RECOVERY PHASE 1 LEVEL 2 FIRST HR: Performed by: STUDENT IN AN ORGANIZED HEALTH CARE EDUCATION/TRAINING PROGRAM

## 2017-06-27 PROCEDURE — 86850 RBC ANTIBODY SCREEN: CPT | Performed by: ANESTHESIOLOGY

## 2017-06-27 PROCEDURE — 40000802 ZZH SITE CHECK

## 2017-06-27 PROCEDURE — 36592 COLLECT BLOOD FROM PICC: CPT | Performed by: THORACIC SURGERY (CARDIOTHORACIC VASCULAR SURGERY)

## 2017-06-27 PROCEDURE — 36000055 ZZH SURGERY LEVEL 2 W FLUORO 1ST 30 MIN - UMMC: Performed by: STUDENT IN AN ORGANIZED HEALTH CARE EDUCATION/TRAINING PROGRAM

## 2017-06-27 PROCEDURE — 25000128 H RX IP 250 OP 636: Performed by: THORACIC SURGERY (CARDIOTHORACIC VASCULAR SURGERY)

## 2017-06-27 PROCEDURE — 25000132 ZZH RX MED GY IP 250 OP 250 PS 637: Mod: GY | Performed by: SURGERY

## 2017-06-27 RX ORDER — SODIUM CHLORIDE, SODIUM LACTATE, POTASSIUM CHLORIDE, CALCIUM CHLORIDE 600; 310; 30; 20 MG/100ML; MG/100ML; MG/100ML; MG/100ML
INJECTION, SOLUTION INTRAVENOUS CONTINUOUS
Status: DISCONTINUED | OUTPATIENT
Start: 2017-06-27 | End: 2017-06-27 | Stop reason: HOSPADM

## 2017-06-27 RX ORDER — EPHEDRINE SULFATE 50 MG/ML
INJECTION, SOLUTION INTRAMUSCULAR; INTRAVENOUS; SUBCUTANEOUS PRN
Status: DISCONTINUED | OUTPATIENT
Start: 2017-06-27 | End: 2017-06-27

## 2017-06-27 RX ORDER — FENTANYL CITRATE 50 UG/ML
INJECTION, SOLUTION INTRAMUSCULAR; INTRAVENOUS PRN
Status: DISCONTINUED | OUTPATIENT
Start: 2017-06-27 | End: 2017-06-27

## 2017-06-27 RX ORDER — CEFAZOLIN SODIUM 1 G/3ML
1 INJECTION, POWDER, FOR SOLUTION INTRAMUSCULAR; INTRAVENOUS SEE ADMIN INSTRUCTIONS
Status: DISCONTINUED | OUTPATIENT
Start: 2017-06-27 | End: 2017-06-27 | Stop reason: HOSPADM

## 2017-06-27 RX ORDER — CEFAZOLIN SODIUM 2 G/100ML
2 INJECTION, SOLUTION INTRAVENOUS
Status: COMPLETED | OUTPATIENT
Start: 2017-06-27 | End: 2017-06-27

## 2017-06-27 RX ORDER — PROPOFOL 10 MG/ML
INJECTION, EMULSION INTRAVENOUS PRN
Status: DISCONTINUED | OUTPATIENT
Start: 2017-06-27 | End: 2017-06-27

## 2017-06-27 RX ORDER — DEXAMETHASONE SODIUM PHOSPHATE 4 MG/ML
INJECTION, SOLUTION INTRA-ARTICULAR; INTRALESIONAL; INTRAMUSCULAR; INTRAVENOUS; SOFT TISSUE PRN
Status: DISCONTINUED | OUTPATIENT
Start: 2017-06-27 | End: 2017-06-27

## 2017-06-27 RX ORDER — ONDANSETRON 2 MG/ML
4 INJECTION INTRAMUSCULAR; INTRAVENOUS EVERY 30 MIN PRN
Status: DISCONTINUED | OUTPATIENT
Start: 2017-06-27 | End: 2017-06-27 | Stop reason: HOSPADM

## 2017-06-27 RX ORDER — ONDANSETRON 2 MG/ML
INJECTION INTRAMUSCULAR; INTRAVENOUS PRN
Status: DISCONTINUED | OUTPATIENT
Start: 2017-06-27 | End: 2017-06-27

## 2017-06-27 RX ORDER — FENTANYL CITRATE 50 UG/ML
25-50 INJECTION, SOLUTION INTRAMUSCULAR; INTRAVENOUS
Status: DISCONTINUED | OUTPATIENT
Start: 2017-06-27 | End: 2017-06-27 | Stop reason: HOSPADM

## 2017-06-27 RX ORDER — SODIUM CHLORIDE, SODIUM LACTATE, POTASSIUM CHLORIDE, CALCIUM CHLORIDE 600; 310; 30; 20 MG/100ML; MG/100ML; MG/100ML; MG/100ML
INJECTION, SOLUTION INTRAVENOUS CONTINUOUS PRN
Status: DISCONTINUED | OUTPATIENT
Start: 2017-06-27 | End: 2017-06-27

## 2017-06-27 RX ORDER — ONDANSETRON 4 MG/1
4 TABLET, ORALLY DISINTEGRATING ORAL EVERY 30 MIN PRN
Status: DISCONTINUED | OUTPATIENT
Start: 2017-06-27 | End: 2017-06-27 | Stop reason: HOSPADM

## 2017-06-27 RX ADMIN — ACETAMINOPHEN 975 MG: 325 TABLET, FILM COATED ORAL at 00:00

## 2017-06-27 RX ADMIN — TRAZODONE HYDROCHLORIDE 100 MG: 100 TABLET ORAL at 21:11

## 2017-06-27 RX ADMIN — DEXAMETHASONE SODIUM PHOSPHATE 8 MG: 4 INJECTION, SOLUTION INTRA-ARTICULAR; INTRALESIONAL; INTRAMUSCULAR; INTRAVENOUS; SOFT TISSUE at 10:29

## 2017-06-27 RX ADMIN — OXYCODONE HYDROCHLORIDE 10 MG: 5 SOLUTION ORAL at 00:01

## 2017-06-27 RX ADMIN — FENTANYL CITRATE 50 MCG: 50 INJECTION, SOLUTION INTRAMUSCULAR; INTRAVENOUS at 10:41

## 2017-06-27 RX ADMIN — POTASSIUM CHLORIDE: 2 INJECTION, SOLUTION, CONCENTRATE INTRAVENOUS at 20:59

## 2017-06-27 RX ADMIN — Medication 5 MG: at 10:35

## 2017-06-27 RX ADMIN — Medication 250 MG: at 21:12

## 2017-06-27 RX ADMIN — FENTANYL CITRATE 25 MCG: 50 INJECTION, SOLUTION INTRAMUSCULAR; INTRAVENOUS at 11:39

## 2017-06-27 RX ADMIN — PANTOPRAZOLE SODIUM 40 MG: 40 TABLET, DELAYED RELEASE ORAL at 13:58

## 2017-06-27 RX ADMIN — SIMETHICONE 40 MG: 20 SUSPENSION/ DROPS ORAL at 16:46

## 2017-06-27 RX ADMIN — OXYCODONE HYDROCHLORIDE 15 MG: 5 SOLUTION ORAL at 08:15

## 2017-06-27 RX ADMIN — LOPERAMIDE HYDROCHLORIDE 4 MG: 1 SOLUTION ORAL at 16:46

## 2017-06-27 RX ADMIN — OXYCODONE HYDROCHLORIDE 20 MG: 5 SOLUTION ORAL at 13:08

## 2017-06-27 RX ADMIN — METOPROLOL TARTRATE 25 MG: 100 TABLET ORAL at 08:26

## 2017-06-27 RX ADMIN — GABAPENTIN 300 MG: 250 SOLUTION ORAL at 16:47

## 2017-06-27 RX ADMIN — LOPERAMIDE HYDROCHLORIDE 4 MG: 1 SOLUTION ORAL at 19:18

## 2017-06-27 RX ADMIN — SODIUM CHLORIDE, POTASSIUM CHLORIDE, SODIUM LACTATE AND CALCIUM CHLORIDE: 600; 310; 30; 20 INJECTION, SOLUTION INTRAVENOUS at 09:56

## 2017-06-27 RX ADMIN — ONDANSETRON 4 MG: 2 INJECTION INTRAMUSCULAR; INTRAVENOUS at 11:32

## 2017-06-27 RX ADMIN — FENTANYL CITRATE 25 MCG: 50 INJECTION, SOLUTION INTRAMUSCULAR; INTRAVENOUS at 12:18

## 2017-06-27 RX ADMIN — Medication 2 PACKET: at 14:03

## 2017-06-27 RX ADMIN — SODIUM CHLORIDE, PRESERVATIVE FREE 5 ML: 5 INJECTION INTRAVENOUS at 23:39

## 2017-06-27 RX ADMIN — OXYCODONE HYDROCHLORIDE 10 MG: 5 SOLUTION ORAL at 03:35

## 2017-06-27 RX ADMIN — SUGAMMADEX 80 MG: 100 INJECTION, SOLUTION INTRAVENOUS at 11:32

## 2017-06-27 RX ADMIN — Medication 2 G: at 18:30

## 2017-06-27 RX ADMIN — FENTANYL CITRATE 50 MCG: 50 INJECTION, SOLUTION INTRAMUSCULAR; INTRAVENOUS at 11:13

## 2017-06-27 RX ADMIN — I.V. FAT EMULSION 180 ML: 20 EMULSION INTRAVENOUS at 20:59

## 2017-06-27 RX ADMIN — GABAPENTIN 600 MG: 250 SOLUTION ORAL at 21:12

## 2017-06-27 RX ADMIN — SUCCINYLCHOLINE CHLORIDE 100 MG: 20 INJECTION, SOLUTION INTRAMUSCULAR; INTRAVENOUS at 10:13

## 2017-06-27 RX ADMIN — PROPOFOL 70 MG: 10 INJECTION, EMULSION INTRAVENOUS at 10:13

## 2017-06-27 RX ADMIN — CEFAZOLIN SODIUM 2 G: 2 INJECTION, SOLUTION INTRAVENOUS at 10:20

## 2017-06-27 RX ADMIN — MINERAL SUPPLEMENT IRON 300 MG / 5 ML STRENGTH LIQUID 100 PER BOX UNFLAVORED 300 MG: at 13:57

## 2017-06-27 RX ADMIN — ACETAMINOPHEN 975 MG: 325 TABLET, FILM COATED ORAL at 16:49

## 2017-06-27 RX ADMIN — Medication 2 PACKET: at 19:16

## 2017-06-27 RX ADMIN — DIPHENHYDRAMINE HYDROCHLORIDE 25 MG: 12.5 SOLUTION ORAL at 00:01

## 2017-06-27 RX ADMIN — Medication 10 ML: at 21:08

## 2017-06-27 RX ADMIN — Medication 6 MG: at 19:16

## 2017-06-27 RX ADMIN — SIMETHICONE 40 MG: 20 SUSPENSION/ DROPS ORAL at 21:12

## 2017-06-27 RX ADMIN — METOPROLOL TARTRATE 25 MG: 100 TABLET ORAL at 21:12

## 2017-06-27 RX ADMIN — Medication 10 MG: at 10:23

## 2017-06-27 RX ADMIN — Medication 10 ML: at 16:45

## 2017-06-27 RX ADMIN — OXYCODONE HYDROCHLORIDE 20 MG: 5 SOLUTION ORAL at 16:45

## 2017-06-27 RX ADMIN — CHLORHEXIDINE GLUCONATE 15 ML: 1.2 RINSE ORAL at 13:57

## 2017-06-27 RX ADMIN — FENTANYL CITRATE 100 MCG: 50 INJECTION, SOLUTION INTRAMUSCULAR; INTRAVENOUS at 10:13

## 2017-06-27 RX ADMIN — FENTANYL CITRATE 25 MCG: 50 INJECTION, SOLUTION INTRAMUSCULAR; INTRAVENOUS at 12:27

## 2017-06-27 RX ADMIN — CHLORHEXIDINE GLUCONATE 15 ML: 1.2 RINSE ORAL at 18:29

## 2017-06-27 RX ADMIN — CHOLESTYRAMINE 4 G: 4 POWDER, FOR SUSPENSION ORAL at 18:16

## 2017-06-27 RX ADMIN — FENTANYL CITRATE 50 MCG: 50 INJECTION, SOLUTION INTRAMUSCULAR; INTRAVENOUS at 10:28

## 2017-06-27 RX ADMIN — OXYCODONE HYDROCHLORIDE 20 MG: 5 SOLUTION ORAL at 21:15

## 2017-06-27 RX ADMIN — SODIUM CHLORIDE, PRESERVATIVE FREE 5 ML: 5 INJECTION INTRAVENOUS at 18:34

## 2017-06-27 RX ADMIN — ROCURONIUM BROMIDE 20 MG: 10 INJECTION INTRAVENOUS at 10:16

## 2017-06-27 NOTE — OR NURSING
Paged Dr. Ramakrishna Ornelas for pre-op orders/affirmation of consent. He is resuident on call for Thoracic surgery.

## 2017-06-27 NOTE — BRIEF OP NOTE
[facility], [place]    Brief Operative Note      Pre-operative diagnosis: Anastomotic Leak   Post-operative diagnosis * No post-op diagnosis entered *  Procedure: Procedure(s):  Esophagogastroduodenoscopy With Removal And Replacement Of Esophageal Stent exchange. Exchange of tubeChest wound dressing change - Wound Class: III-Contaminated  Surgeon: Surgeon(s) and Role:     * Nalini Barreto MD - Primary     * Viki Carmen MD - Assisting     * Storm Whitlock MD - Assisting  Anesthesia: General   Estimated blood loss: Minimal  Drains: Pharyngostomy tube exchanged  Specimens: * No specimens in log *  Findings:   None.  Complications: None.  Implants: 19x120 endomaxx esophageal stent

## 2017-06-27 NOTE — PLAN OF CARE
Problem: Goal Outcome Summary  Goal: Goal Outcome Summary  Outcome: No Change  Vital signs:  Temp: 95.8  F (35.4  C) Temp src: Oral BP: 120/64 Pulse: 86 Heart Rate: 73 Resp: 16 SpO2: 100 % O2 Device: None (Room air)   VSS. Chest dressing C/D/I. Pharyngostomy to LIS, 100 mL of green output. G-tube clamped. TPN and lipids infusing via PICC. C/O pain, oxycodone administered x 2 for pain, fetanyl patch in place. Denies nausea. Up to commode, continued diarrhea. Sleeping throughout the night.

## 2017-06-27 NOTE — ANESTHESIA CARE TRANSFER NOTE
Patient: Minerva Blanco    Procedure(s):  Esophagogastroduodenoscopy With Removal And Replacement Of Esophageal Stent exchange. Exchange of pharyngtomy tube. Chest wound dressing change - Wound Class: III-Contaminated    Diagnosis: Anastomotic Leak   Diagnosis Additional Information: No value filed.    Anesthesia Type:   General, RSI     Note:  Airway :Face Mask  Patient transferred to:PACU  Comments: Report to RNEDDIE      Vitals: (Last set prior to Anesthesia Care Transfer)    CRNA VITALS  6/27/2017 1114 - 6/27/2017 1148      6/27/2017             Pulse: 91    ART BP: (!)  1/1    ART Mean: 1    SpO2: 100 %    Resp Rate (observed): (!)  33                Electronically Signed By: Charles Patrick Schlatter, APRN CRNA  June 27, 2017  11:48 AM

## 2017-06-27 NOTE — PLAN OF CARE
Vitals:    06/27/17 1330 06/27/17 1345 06/27/17 1400 06/27/17 1548   BP: 130/65 110/59 119/48 121/64   BP Location: Left arm Left arm Left arm Left arm   Cuff Size:       Pulse: 83 78 93 80   Resp:   16 16   Temp:    98.6  F (37  C)   TempSrc:    Oral   SpO2: 98% 95% 94% 99%   Weight:       Height:         Pt came back from OR at 1300. Refused capnography monitoring. Afebrile, 95-99% RA, other VSS. A&O x4. Denies nausea and SOB. Pain controlled with PRN oxycodone. Fentanyl patch on back (placed at 6/26, 2000 per pt report). Chest dressing changed by MD, per notes, CDI. Pharyngostomy to LIS, 50ml output. G/J clamped, pt refused TF. Continues to have diarrhea. Voiding spontaneously, not saving. Continue to monitor.

## 2017-06-27 NOTE — PROGRESS NOTES
CLINICAL NUTRITION SERVICES - REASSESSMENT NOTE     Nutrition Prescription    RECOMMENDATIONS FOR MDs/PROVIDERS TO ORDER:  If appropriate to start cycling TPN, would recommend decrease daily TPN volume to 1080 mL/day and start with an 18 hour cycle--> recommend reducing PN volume based on previous attempt to cycle TPN on 6/2.  Pt's PICC needed to be replaced 6/3 due to leaking-- nutrition/pharmacy question if leaking was related to increased TPN rate intolerances w/ cycling TPN.  If team okay with trying to cycle PN w/ lower volume, would increase free water flushes via FT to 60 mL q4h (adjustments pending hydration trends)    Malnutrition Status:    Severe malnutrition in the context of chronic illness    Recommendations already ordered by Registered Dietitian (RD):  None at this time     Future/Additional Recommendations:  1. Before discontinuing TPN and relying of TF for 100% estimated nutrition needs, recommend pt be able to tolerate either:  a.  TwoCal HN @ 35 ml/hr continuously (840 ml/day) to provide 1680 kcals (42 kcal/kg), 71 g PRO (1.8 g/kg), 588 ml free H2O, 184 g CHO and 4 g Fiber daily.   -- If to provide 100% estimated fluid needs from TF/free water, would recommend 75 mL water flushes q2h so TF + free water = 1488 mL/day free water. May need to adjust this fluid volume recommendation pending hydration status.     OR      b. TwoCal HN @ 50 mL/hr x 16 hours (800 mL/day) to provide 1600 kcal (40 kcal/kg), 67 g PRO (1.7 g/kg), 175 g CHO, 73 g fat, 4 g fiber, and 560 mL free water daily  -- If to provide 100% estimated fluid needs from TF/free water, would recommend pt get an additional ~900 mL/day free water, method pending pt tolerance of free water administration.  Could be given as flushes of ~110 mL 8x/day.  Could also consider given free water via feed-and-flush system-- free water @ 55 mL/hr x 16 hours during TF cycle.  May need to adjust this fluid volume recommendation pending hydration status.    *Pt in OR/busy with cares during attempts to visit, obtained info from chart  EVALUATION OF THE PROGRESS TOWARD GOALS   Diet: No diet order    TF: TwoCal HN @ 35 mL/hr x 16 hours (560 mL/day, daytime only) to provide 1120 kcal (29 kcal/kg), 47 g PRO (1.2 g/kg), 123 g CHO, 51 g fat, 3 g fiber, and 392 mL/day free water  per dosing weight 39 kg.    TF Intake: per I/Os, 7-day avg TF intake = 174 mL/day TwoCal HN which provides 348 kcal and 15 g PRO which meets 25% kcal needs and 25% PRO needs per dosing weight 40 kg.  Suspect I/Os not entirely accurate as per review of progress notes pt running TF mostly @ 25 mL/hr x 16 hours (400 mL/day) the past week, a few times documented @ 15 mL/hr (240 mL/day = 480 kcal and 20 g PRO daily), and has been tolerating TF more frequently @ 30-35 mL/hr (480-560 mL/day = 960-1120 kcal and 40-45 g PRO daily) for various amounts of time.      TPN: 1440 mL/day with 165 g dextrose, 65 g AA, and 180 mL 20% IV lipids 5 times per week which provides 1078 kcal (27 kcal/kg), 1.6 g/kg PRO, GIR 2.9 mg/kg/min, 0.9 g/kg/day fat and 24% kcal from fat per dosing weight 40 kg. This meets 90% estimated kcal needs from sole PN and 100% estimated PRO needs per dosing weight 40 kg.     NEW FINDINGS   Weight: stable 40.5-41 kg over the past week  Labs: No labs yet today.  Per labs yesterday: Cr 0.37 (L), trending up; Alk phos 322 (H), trending down; TGs 65 (WNL)  GI: loose stools continue, per chart appears is volume slightly decreased than previously but appears no significant improvements over the past week.    MALNUTRITION  % Intake: Suspect no decreased intake noted (PN+EN), but difficult to accurately assess TF intake  % Weight Loss: None noted  Subcutaneous Fat Loss: Facial region:  moderate, Upper arm:  Moderate/severe and Lower arm:  Moderate/severe per last RD note 6/21  Muscle Loss: Temporal, Facial & jaw region, Upper arm (bicep, tricep), Lower arm  (forearm), Upper leg (quadricep, hamstring),  Patellar region,  and Posterior calf:  Moderate/severe  per last RD note 6/21  Fluid Accumulation/Edema: None noted per chart review  Malnutrition Diagnosis: Severe malnutrition in the context of chronic illness    Previous Goals   Total avg nutritional intake to meet a minimum of 30 kcal/kg (solely from PN) or 35 kcal/kg (PN+EN) and 1.5 g PRO/kg daily (per dosing wt 39 kg)  Evaluation: Suspect Met    Previous Nutrition Diagnosis  Inadequate enteral nutrition infusion related to loose stools/abdominal cramping delaying tolerance of advancing and toleratingTF at goal as evidenced by pt not yet advanced to goal TF rate 35 mL/hr and 7-day avg TF intake meeting 66% kcal needs and 32% PRO needs  Evaluation: No change, difficult to accurately assess if improving or declining    CURRENT NUTRITION DIAGNOSIS  Inadequate enteral nutrition infusion related to loose stools/abdominal cramping hindering consistent TF tolerances/intake at goal rate as evidenced by 7-day avg TF intake per I/Os meeting 25% kcal needs and 25% PRO needs (suspect some I/O documentation error 2/2 difficulty to accurately record TF intake 2/2 pt frequently changing TF rates) and reports of pt tolerating TF mainly at 25 mL/hr x 16 hours the past week.    INTERVENTIONS  Implementation  Collaboration with other providers: paged team to discuss possibly cycling TPN (see recs above), waiting reply.  Discussed/collaborated with pharmacy regarding above TPN cycle recs    Goals  Total avg nutritional intake to meet a minimum of 30 kcal/kg (solely from PN) or 35 kcal/kg (PN+EN) and 1.5 g PRO/kg daily (per dosing wt 40 kg)    Monitoring/Evaluation  Progress toward goals will be monitored and evaluated per protocol.     Evie Montanez RD, LD   [address]B RD Pager: [phone]

## 2017-06-27 NOTE — ANESTHESIA POSTPROCEDURE EVALUATION
Patient: Minerva Blanco    Procedure(s):  Esophagogastroduodenoscopy With Removal And Replacement Of Esophageal Stent exchange. Exchange of pharyngtomy tube. Chest wound dressing change - Wound Class: III-Contaminated    Diagnosis:Anastomotic Leak   Diagnosis Additional Information: No value filed.    Anesthesia Type:  General, RSI    Note:  Anesthesia Post Evaluation    Patient location during evaluation: PACU  Patient participation: Able to fully participate in evaluation  Level of consciousness: awake  Pain management: adequate  Airway patency: patent  Cardiovascular status: acceptable  Respiratory status: acceptable  Hydration status: acceptable  PONV: none     Anesthetic complications: None          Last vitals:  Vitals:    06/26/17 2045 06/26/17 2203 06/27/17 0823   BP: 114/46 120/64 141/72   Pulse:  86 94   Resp:  16 16   Temp:  35.4  C (95.8  F) 35.7  C (96.2  F)   SpO2:  100% 99%         Electronically Signed By: Maru Butler MD  June 27, 2017  12:00 PM

## 2017-06-27 NOTE — PROGRESS NOTES
"SPIRITUAL HEALTH SERVICES  SPIRITUAL ASSESSMENT Progress Note (Palliative Focus)  [facility] U7B    REFERRAL SOURCE:  follow-up post-surgery.    Minerva declined a visit today \"now is not a good time then apologized.\"  I just had surgery!   I will inform the palliative  of care provided.    Tyler Odonnell  Chaplain Resident  Pager [phone]  "

## 2017-06-27 NOTE — PLAN OF CARE
Problem: Goal Outcome Summary  Goal: Goal Outcome Summary  Outcome: No Change  B/P: 120/64, T: 95.8, P: 86, R: 16  Patient states pain is tolerable with Oxycodone every 3 hours, and Fentanyl patch.  Pharyngostomy tube intact hooked to LIS pulling moderate amount of green output. G-tube clamped. J tube intact infusing tube feeding at 35 ml/hr most of the evening, is now shut off and clamped.  TPN infusing at 60 ml/hr along with lipids through right PICC. Up independently.  Continues to have loose stools, and voiding spontaneously mixed with stool.  Continue with POC.

## 2017-06-28 ENCOUNTER — APPOINTMENT (OUTPATIENT)
Dept: GENERAL RADIOLOGY | Facility: CLINIC | Age: 72
DRG: 856 | End: 2017-06-28
Attending: STUDENT IN AN ORGANIZED HEALTH CARE EDUCATION/TRAINING PROGRAM
Payer: MEDICARE

## 2017-06-28 LAB — INTERPRETATION ECG - MUSE: NORMAL

## 2017-06-28 PROCEDURE — 25000125 ZZHC RX 250: Performed by: PHYSICIAN ASSISTANT

## 2017-06-28 PROCEDURE — 25000125 ZZHC RX 250: Performed by: SURGERY

## 2017-06-28 PROCEDURE — 12000003 ZZH R&B CRITICAL UMMC

## 2017-06-28 PROCEDURE — A9270 NON-COVERED ITEM OR SERVICE: HCPCS | Mod: GY | Performed by: INTERNAL MEDICINE

## 2017-06-28 PROCEDURE — 25000132 ZZH RX MED GY IP 250 OP 250 PS 637: Mod: GY | Performed by: INTERNAL MEDICINE

## 2017-06-28 PROCEDURE — 25000132 ZZH RX MED GY IP 250 OP 250 PS 637: Mod: GY | Performed by: PHYSICIAN ASSISTANT

## 2017-06-28 PROCEDURE — A9270 NON-COVERED ITEM OR SERVICE: HCPCS | Mod: GY | Performed by: PHYSICIAN ASSISTANT

## 2017-06-28 PROCEDURE — A9270 NON-COVERED ITEM OR SERVICE: HCPCS | Mod: GY | Performed by: STUDENT IN AN ORGANIZED HEALTH CARE EDUCATION/TRAINING PROGRAM

## 2017-06-28 PROCEDURE — 40000986 XR CHEST PORT 1 VW

## 2017-06-28 PROCEDURE — 25000125 ZZHC RX 250: Performed by: STUDENT IN AN ORGANIZED HEALTH CARE EDUCATION/TRAINING PROGRAM

## 2017-06-28 PROCEDURE — 99207 ZZC CDG-CORRECTLY CODED, REVIEWED AND AGREE: CPT | Performed by: INTERNAL MEDICINE

## 2017-06-28 PROCEDURE — A9270 NON-COVERED ITEM OR SERVICE: HCPCS | Mod: GY | Performed by: SURGERY

## 2017-06-28 PROCEDURE — 25000125 ZZHC RX 250

## 2017-06-28 PROCEDURE — 25000132 ZZH RX MED GY IP 250 OP 250 PS 637: Mod: GY | Performed by: STUDENT IN AN ORGANIZED HEALTH CARE EDUCATION/TRAINING PROGRAM

## 2017-06-28 PROCEDURE — 99232 SBSQ HOSP IP/OBS MODERATE 35: CPT | Mod: GC | Performed by: INTERNAL MEDICINE

## 2017-06-28 PROCEDURE — 25000128 H RX IP 250 OP 636: Performed by: STUDENT IN AN ORGANIZED HEALTH CARE EDUCATION/TRAINING PROGRAM

## 2017-06-28 PROCEDURE — 40000802 ZZH SITE CHECK

## 2017-06-28 PROCEDURE — 40000558 ZZH STATISTIC CVC DRESSING CHANGE

## 2017-06-28 PROCEDURE — 25000132 ZZH RX MED GY IP 250 OP 250 PS 637: Mod: GY | Performed by: SURGERY

## 2017-06-28 RX ORDER — GABAPENTIN 250 MG/5ML
300 SOLUTION ORAL EVERY 12 HOURS SCHEDULED
Status: DISCONTINUED | OUTPATIENT
Start: 2017-06-28 | End: 2017-06-29 | Stop reason: HOSPADM

## 2017-06-28 RX ADMIN — BACITRACIN ZINC: 500 OINTMENT TOPICAL at 08:23

## 2017-06-28 RX ADMIN — OXYCODONE HYDROCHLORIDE 15 MG: 5 SOLUTION ORAL at 03:52

## 2017-06-28 RX ADMIN — Medication 10 ML: at 08:24

## 2017-06-28 RX ADMIN — Medication 2 PACKET: at 19:01

## 2017-06-28 RX ADMIN — POTASSIUM CHLORIDE: 2 INJECTION, SOLUTION, CONCENTRATE INTRAVENOUS at 20:31

## 2017-06-28 RX ADMIN — OXYCODONE HYDROCHLORIDE 15 MG: 5 SOLUTION ORAL at 10:00

## 2017-06-28 RX ADMIN — OXYCODONE HYDROCHLORIDE 15 MG: 5 SOLUTION ORAL at 16:02

## 2017-06-28 RX ADMIN — SIMETHICONE 40 MG: 20 SUSPENSION/ DROPS ORAL at 08:20

## 2017-06-28 RX ADMIN — METOPROLOL TARTRATE 25 MG: 100 TABLET ORAL at 20:40

## 2017-06-28 RX ADMIN — LOPERAMIDE HYDROCHLORIDE 4 MG: 1 SOLUTION ORAL at 20:34

## 2017-06-28 RX ADMIN — Medication 250 MG: at 08:21

## 2017-06-28 RX ADMIN — GABAPENTIN 300 MG: 250 SOLUTION ORAL at 20:35

## 2017-06-28 RX ADMIN — Medication 2 PACKET: at 08:25

## 2017-06-28 RX ADMIN — DIPHENHYDRAMINE HYDROCHLORIDE 25 MG: 12.5 SOLUTION ORAL at 00:07

## 2017-06-28 RX ADMIN — Medication 250 MG: at 20:35

## 2017-06-28 RX ADMIN — SIMETHICONE 40 MG: 20 SUSPENSION/ DROPS ORAL at 20:35

## 2017-06-28 RX ADMIN — OXYCODONE HYDROCHLORIDE 15 MG: 5 SOLUTION ORAL at 00:07

## 2017-06-28 RX ADMIN — Medication 10 ML: at 16:02

## 2017-06-28 RX ADMIN — DIPHENHYDRAMINE HYDROCHLORIDE 25 MG: 12.5 SOLUTION ORAL at 20:33

## 2017-06-28 RX ADMIN — CHLORHEXIDINE GLUCONATE 15 ML: 1.2 RINSE ORAL at 20:34

## 2017-06-28 RX ADMIN — ACETAMINOPHEN 975 MG: 325 TABLET, FILM COATED ORAL at 16:02

## 2017-06-28 RX ADMIN — OXYCODONE HYDROCHLORIDE 15 MG: 5 SOLUTION ORAL at 20:08

## 2017-06-28 RX ADMIN — SIMETHICONE 40 MG: 20 SUSPENSION/ DROPS ORAL at 16:05

## 2017-06-28 RX ADMIN — CHLORHEXIDINE GLUCONATE 15 ML: 1.2 RINSE ORAL at 15:09

## 2017-06-28 RX ADMIN — OXYCODONE HYDROCHLORIDE 15 MG: 5 SOLUTION ORAL at 13:02

## 2017-06-28 RX ADMIN — LOPERAMIDE HYDROCHLORIDE 4 MG: 1 SOLUTION ORAL at 08:22

## 2017-06-28 RX ADMIN — MINERAL SUPPLEMENT IRON 300 MG / 5 ML STRENGTH LIQUID 100 PER BOX UNFLAVORED 300 MG: at 08:20

## 2017-06-28 RX ADMIN — METOPROLOL TARTRATE 25 MG: 100 TABLET ORAL at 08:34

## 2017-06-28 RX ADMIN — Medication 1 LOZENGE: at 10:16

## 2017-06-28 RX ADMIN — SIMETHICONE 40 MG: 20 SUSPENSION/ DROPS ORAL at 12:31

## 2017-06-28 RX ADMIN — ENOXAPARIN SODIUM 40 MG: 40 INJECTION SUBCUTANEOUS at 12:31

## 2017-06-28 RX ADMIN — Medication 10 ML: at 20:34

## 2017-06-28 RX ADMIN — PANTOPRAZOLE SODIUM 40 MG: 40 TABLET, DELAYED RELEASE ORAL at 15:09

## 2017-06-28 RX ADMIN — ACETAMINOPHEN 975 MG: 325 TABLET, FILM COATED ORAL at 08:21

## 2017-06-28 RX ADMIN — CHOLESTYRAMINE 4 G: 4 POWDER, FOR SUSPENSION ORAL at 10:00

## 2017-06-28 RX ADMIN — GABAPENTIN 300 MG: 250 SOLUTION ORAL at 08:33

## 2017-06-28 RX ADMIN — Medication 2 PACKET: at 15:09

## 2017-06-28 RX ADMIN — CHOLESTYRAMINE 4 G: 4 POWDER, FOR SUSPENSION ORAL at 17:33

## 2017-06-28 RX ADMIN — CHLORHEXIDINE GLUCONATE 15 ML: 1.2 RINSE ORAL at 08:21

## 2017-06-28 RX ADMIN — TRAZODONE HYDROCHLORIDE 100 MG: 100 TABLET ORAL at 20:33

## 2017-06-28 RX ADMIN — LOPERAMIDE HYDROCHLORIDE 4 MG: 1 SOLUTION ORAL at 12:31

## 2017-06-28 RX ADMIN — Medication 10 ML: at 12:31

## 2017-06-28 RX ADMIN — OXYCODONE HYDROCHLORIDE 15 MG: 5 SOLUTION ORAL at 06:51

## 2017-06-28 RX ADMIN — Medication 6 MG: at 10:16

## 2017-06-28 RX ADMIN — ACETAMINOPHEN 975 MG: 325 TABLET, FILM COATED ORAL at 00:07

## 2017-06-28 RX ADMIN — SODIUM CHLORIDE, PRESERVATIVE FREE 5 ML: 5 INJECTION INTRAVENOUS at 16:02

## 2017-06-28 RX ADMIN — LOPERAMIDE HYDROCHLORIDE 4 MG: 1 SOLUTION ORAL at 16:14

## 2017-06-28 RX ADMIN — I.V. FAT EMULSION 180 ML: 20 EMULSION INTRAVENOUS at 20:31

## 2017-06-28 RX ADMIN — BACITRACIN ZINC: 500 OINTMENT TOPICAL at 20:32

## 2017-06-28 ASSESSMENT — PAIN DESCRIPTION - DESCRIPTORS: DESCRIPTORS: CRAMPING

## 2017-06-28 NOTE — PLAN OF CARE
Problem: Goal Outcome Summary  Goal: Goal Outcome Summary  Outcome: No Change  Afeb, VSS & pain at tolerable level with current pain med regime & denies n & v. Pharyngostomy to LIS with greenish/ thick output & irrigated per order. Site red & sore. Mid chest incision with new packing done by team. TPN & lipids per order. TF running at goal rate since 0900 am & pt had to run lower for an hour since she was feeling bloated with meds. Watery stool & up to commode independently. G/J tube with TF & meds, dressing cdi. CXR to verify PICC placement per vascular RN. Continue with current plan & POC.

## 2017-06-28 NOTE — PROGRESS NOTES
CLINICAL NUTRITION SERVICES - BRIEF NOTE  *See RD note 6/27 for last nutrition reassessment details    INTERVENTIONS  Implementation  Collaboration with other providers: per primary team, okay to start cycling TPN.  Nutrition/pharmacy previously attempted to cycle TPN 6/2, but PICC leaked/needed to be replaced 6/3 so questioned if leaking r/t increased TPN cycled rate; primary team does not believe cycling TPN caused PICC to leak, okay to start cycling TPN  Parenteral Nutrition/IV Fluids - Discussed cycling TPN w/ pharmacy    Monitoring/Evaluation  Progress toward goals will be monitored and evaluated per protocol.     Evie Montanez, ALEX, LD   [address]B RD Pager: [phone]

## 2017-06-28 NOTE — PLAN OF CARE
Problem: Goal Outcome Summary  Goal: Goal Outcome Summary  Outcome: No Change  Vital signs:  Temp: 96.2  F (35.7  C) Temp src: Oral BP: 118/48 Pulse: 60 Heart Rate: 79 Resp: 16 SpO2: 100 % O2 Device: None (Room air)   VSS. Chest dressing C/D/I. Pharyngostomy to LIS, green output. G-tube clamped overnight, TF's to be connected at 0700. PICC infusing TPN and lipids. C/O neck and chest pain near wound, oxycodone administered for pain relief. Up to commode independently. Reports continued loose stools. Sleeping between cares.

## 2017-06-28 NOTE — PROGRESS NOTES
Social Work Services Progress Note    Hospital Day: 42  Date of Initial Social Work Evaluation:  6/14/17- please see for details  Collaborated with:  Chart review, team rounds, Thoracic team,  TCU admissions    Data:  Pt is being followed for TCU placement for medical cares and [facility].   Received update from Thoracic team that pt went to the OR yesterday and had pharengostomy and stent replaced. Team reported that pt's wound is getting better and pt will d/c with TPN and tube feeds along with pharengostomy that can be off of suction for participation with therapies at TCU. Per team pt will return to OR in 3 weeks. Thoracic team reported that pt is ready to d/c today and they only want pt to d/c to  TCU.     Intervention:  Ovi contacted Sheree with  TCU admissions and inquired about bed availability and ability to accept pt today, waiting to hear back. Sheree reported she spoke with Thoracic team and pt can officially still be accepted but there is no bed available today but potentially tomorrow.     Assessment:  TCU, see bedside RN, PT/OT, medical team notes    Plan:    Anticipated Disposition:  Facility:  Accepted at [facility]    Barriers to d/c plan:  No bed available at [facility]    Follow Up:  ovi GRIMES will continue to follow for d/c planning    BALJIT Oden, MSW  7B   [phone] (pager) [phone]  6/28/2017

## 2017-06-28 NOTE — PROGRESS NOTES
Thoracic Surgery Progress Note    No acute events overnight. Tolerated TFs @ 25. Diarrhea continues. Pain controlled.     /41 (BP Location: Left arm)  Pulse 64  Temp 96.4  F (35.8  C) (Oral)  Resp 16  Ht 1.524 m (5')  Wt 40.7 kg (89 lb 11.2 oz)  SpO2 97%  Breastfeeding? No  BMI 17.52 kg/m2     Laying comfortably in bed in no acute distress  Awake, alert and appropriate  Non-labored breathing on room air  Pharyngostomy tube to LIWS with gastric fluid, site is clean and dry.   Abdomen is soft not distended or tender  Chest wounds healing. Superior wound closed. Inferior incision with grannulation tissue at base, small volume purulent drainage in wound with dressing changes.     CT scan reviewed.   I/Os reviewed.     72 female with esophageal perforation status post Nissen at OSH now status post an esophagectomy with gastric pull up which was complicated by an anastomotic leak and mediastinal abscess. Status post serial washouts, esophageal stent placement and pharyngostomy tube on 6/4, healing well.     -Continue pharyngostomy tube to suction  -Continue strict NPO with tube feeds cycled off for 6 hours at night, please minimize disruptions to feeds.   -Continue anti-motility agents for diarrhea, fiber, cholestyramine.   -Appreciate palliative assistance with pain management  -Daily dressing changes  -Continue DVT/GI prophylaxis  -Likely to TCU tomorrow if bed available    Saul Rogers PA-C  P: [phone]

## 2017-06-28 NOTE — DISCHARGE SUMMARY
NAME: Minerva Blanco   MRN: [id number]   : 1945     DATE OF ADMISSION: 2017     ADMISSION DIAGNOSES:   1. S/P Esophagectomy with gastric pull up  2. Anastamotic leak   3. Mediastinal abscess    PROCEDURES PERFORMED:   17 admission procedure  1.  Incision and drainage of mediastinal wound.   2.  Drainage of mediastinal abscess through a [place] incision.   3.  Mediastinal chest tube placement.   4.  Esophagogastroscopy.     ADDITIONAL PROCEDURES PERFORMED:    - Multiple trips to OR for repeat washouts    - Pharyngostomy tube placement     - Esophageal stent placement     - EGD with Esophageal Stent exchange     CULTURE RESULTS: Negative cdiff.  O/w no recent pertinent culture results    INTRAOPERATIVE COMPLICATIONS: None     POSTOPERATIVE COMPLICATIONS: None     CONSULTS:   1. Palliative medicine: for pain control and coping skills    Most recent recs:   Pain: Recommend keeping the oxycodone dosing as is: 10-20 mg Q 3 hours prn.  She is on a fentanyl patch currently at 100 mcg/hr. We talked about slowly decreasing the dose and supplementing some non pharmacologic methods of coping and symptom management.     -Would recommend decreasing the fentanyl patch by 12 mcg every 6 days starting on .    2. GI consult: for diarrhea workup    Most recent recs:  -- Increase fiber to 2 packets x3/day, patient gets bloating with fiber and simethicone prn might be helpful  -- Consider restarting cholestyramine     -- Accurate documentation of stool output  -- GI team is signing off patient to primary team      DRAINS/TUBES PRESENT AT DISCHARGE:   1. Pharyngostomy tube to LIS while in bed, ok for gravity or clamp with activity (1hr or less)  2. Feeding jejunostomy for tube feeds    DATE OF DISCHARGE:  2017     HOSPITAL COURSE: Minerva Blanco is a 72 year old female who on 2017 underwent the above-named procedures.  She tolerated the operation well and postoperatively was  transferred to the general post-surgical unit.  The remainder of her course was essentially uncomplicated and consisted of wound care, medical management of diarrhea, and occasional OR washouts.  Prior to discharge, her pain was controlled well and her diarrhea was under improved control (though still present).  Her wounds were healing well.  [facility] 2017[facility] follow up in place.    DISCHARGE EXAM:   A&O, NAD, anisocoria present at baseline  Resp non-labored  Distal extremities warm    Wounds healing well     DISCHARGE INSTRUCTIONS:    Discharge Procedure Orders  Home care nursing referral   Referral Type: Home Health Therapies & Aides     Home infusion referral     General info for SNF   Order Comments: Length of Stay Estimate: Short Term Care: Estimated # of Days <30  Condition at Discharge: Improving  Level of care:skilled   Rehabilitation Potential: Good  Admission H&P remains valid and up-to-date: Yes  Recent Chemotherapy: N/A  Use Nursing Home Standing Orders: Yes     Mantoux instructions   Order Comments: Give two-step Mantoux (PPD) Per Facility Policy Yes     Activity - Up ad renea   Order Specific Question Answer Comments   Is discharge order? Yes      Ostomy care   Order Comments: J-tube: daily dressing changes, maintain flexitrack, flush after meds and tube feeds    Pharyngostomy tube: 30 mL flushes qshift. Low intermittent suction. OK to put to gravity or clamp with activity     Wound care (specify)   Order Comments: Pack sternal wound with kerlix daily.     Follow Up ([facility])   Order Comments: EGD and washout 7/17/17     IV access   Order Comments: Please do NOT remove PICC line. Needed for TPN     Full Code     Nutrition Services Adult IP Consult   Order Comments: Reason:  TPN management     Adult Formula Drip Feeding   Order Comments: TwoCal HN @ 35 ml/hr continuously (840 ml/day) to provide 1680 kcals (42 kcal/kg), 71 g PRO (1.8 g/kg),  588 ml free H2O, 184 g CHO and 4 g Fiber daily.         DISCHARGE MEDICATIONS:    Minerva Blanco   Home Medication Instructions PINO:[phone]    Printed on:06/29/17 2733   Medication Information                      chlorhexidine (HIBICLENS) 4 % liquid  Apply topically 2 times daily             cholestyramine (QUESTRAN) 4 G Packet  Take 1 packet by mouth daily             DiphenhydrAMINE HCl (BENADRYL PO)  Take 25 mg by mouth nightly as needed             diphenoxylate-atropine (LOMOTIL) 0.5-0.005 mg/mL liquid  Take 5 mLs by mouth 4 times daily as needed for diarrhea             enoxaparin (LOVENOX) 40 MG/0.4ML injection  Inject 0.4 mLs (40 mg) Subcutaneous every 24 hours             ferrous sulfate 300 (60 FE) MG/5ML syrup  5 mLs (300 mg) by Per J Tube route daily             fiber modular (NUTRISOURCE FIBER) packet  1 packet by Per Feeding Tube route 3 times daily (with meals)             gabapentin (NEURONTIN) 250 MG/5ML solution  Take 6 mLs (300 mg) by mouth every 8 hours             Lactobacillus Rhamnosus, GG, (CULTURELLE PO)  Take 1 tablet by mouth 2 times daily              loperamide (IMODIUM) 1 MG/5ML liquid  Take 20 mLs (4 mg) by mouth 4 times daily as needed for diarrhea             metoprolol (LOPRESSOR) 10 mg/mL SUSP  2.5 mLs (25 mg) by Per J Tube route 2 times daily             multivitamins with minerals (CERTAVITE/CEROVITE) LIQD liquid  Take 15 mLs by mouth daily             opium tincture 10 mg/mL (1%) liquid  Take 0.6 mLs (6 mg) by mouth every 6 hours             order for DME  Equipment being [place]:   [facility] [place]termittent  Suction Canisters(2)  Suction Canister Holders(2)  Suction Connect Tube(2)  5 in 1 Connector(2)  Bacteria Filter(2)  Yaunkauer Suction(2)    Treatment Diagnosis: Esophogeal Perforation, s/p esophagectomy             order for DME  Equipment being ordered:   Suction Pump  Suction Canister(2)  Suction Tubing(2)  Bacteria Filter(2)  Yaunkauer(4)  Red Rubber  Catheter(2)    Treatment Diagnosis: Esophogeal Perforation, s/p esophagectomy             oxyCODONE (ROXICODONE) 10 MG IR tablet  Take 1 to 1.5 tablet every 3-4 hours PRN pain             oxyCODONE (ROXICODONE) 5 MG/5ML solution  10-20 mLs (10-20 mg) by Per J Tube route every 3 hours as needed for moderate to severe pain             ranitidine (ZANTAC) 150 MG/10ML syrup  Take 10 mLs (150 mg) by mouth 2 times daily             simethicone (MYLICON) 40 MG/0.6ML suspension  0.6 mLs (40 mg) by Per Feeding Tube route 4 times daily             traZODone (DESYREL) 100 MG tablet  0.5 tablets (50 mg) by Per G Tube route At Bedtime

## 2017-06-28 NOTE — PROGRESS NOTES
[facility], [place]   Palliative Care Daily Progress Note          Recommendations, Patient/Family Counseling & Coordination     Pain: Recommend keeping the oxycodone dosing as is: 10-20 mg Q 3 hours prn.  She is on a fentanyl patch currently at 100 mcg/hr. We talked about slowly decreasing the dose and supplementing some non pharmacologic methods of coping and symptom management. Would recommend decreasing the fentanyl patch by 12 mcg every 6 days starting on 6/30.    POLST - none - full code     Thank you for the opportunity to continue to participate in the care of this patient and family.  Please feel free to contact on-call palliative provider with any emergent needs.  We can be reached via team pager [phone] (answered 8-4:30 Monday-Friday); after-hours answering service ([phone]); [facility] - [facility]: [phone].     The patient was discussed with Dr. Chang.    Jacek Ramirez MD  Palliative medicine fellow  Beeper [phone]    Attestation:   Patient seen and evaluated with Dr. Ramirez and I agree with/confirm his findings/recs in this note. Total time on the floor involved in the patient's care: 30 minutes. Greater than 50% of my time was spent counseling and/or coordination of care regarding symptoms and goals.   Thank you for involving us in the patient's care.   Nisha Chang MD / Palliative Medicine / Pager [phone];  [facility] Inpatient Team Consult Pager [phone] (answered 8am-430pm M-F) - ok to text page via [employer] / After-Hours Answering Service [phone] / Palliative Clinic in the [facility] at the [facility] - [phone] (scheduling); [phone] (triage).        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 She gets serial washouts and has an esophageal stent and pharyngostomy tube  for drainage. She needs help with coping and more consistent pain control.     2)  Psychosocial/Spiritual Needs:   Ongoing: She is being seen by the palliative .          Is new assessment/intervention required by palliative team?:  no         Interval History:   Minerva went back to the OR yesterday for endoscopy and esophageal stent exchange as well as pharyngostomy tube exchange. She remains on a fentanyl patch at 100 mcg and oxycodone 10-20 mg Q 3 hours prn. With her recent procedure, she is hoping not to have any pain medicine adjustments at this time. She tells me she might be discharging to a rehab facility tomorrow or Friday.            Review of Systems:   Palliative Symptom Review (0=no symptom/no concern, 1=mild, 2=moderate, 3=severe):      Pain: 1-2      Fatigue: 1      Nausea: 0      Constipation: 0      Diarrhea: 2      Depressive Symptoms: 2      Anxiety: 1      Drowsiness: 0      Poor Appetite: N/A      Shortness of Breath: 0      Insomnia: 1      Overall (0 good/no concerns, 3 very poor):  1-2            Medications:   I have reviewed this patient's medication profile and medications given in past 24 hours.  Current Facility-Administered Medications   Medication     parenteral nutrition - ADULT compounded formula     opium tincture 10 MG/ML (1%) liquid 6 mg     lipids (INTRALIPID) 20 % infusion 180 mL     acetaminophen (TYLENOL) tablet 975 mg     hydrocortisone (CORTAID) 1 % cream     diphenhydrAMINE (BENADRYL) solution 25 mg     diphenhydrAMINE-zinc acetate (BENADRYL) cream     fiber modular (NUTRISOURCE FIBER) packet 2 packet     cholestyramine (QUESTRAN) Packet 4 g     gabapentin (NEURONTIN) solution 600 mg     gabapentin (NEURONTIN) solution 300 mg     simethicone (MYLICON) suspension 40 mg     lidocaine (XYLOCAINE) 5 % ointment     fentaNYL (DURAGESIC) Patch in Place     fentaNYL (DURAGESIC) patch REMOVAL     fentaNYL (DURAGESIC) 100 mcg/hr 72 hr patch 1 patch     oxyCODONE  (ROXICODONE) solution 10-20 mg     Benzocaine (HURRICAINE/TOPEX) 20 % spray     saccharomyces boulardii (FLORASTOR) capsule 250 mg     enoxaparin (LOVENOX) injection 40 mg     bacitracin ointment     chlorhexidine (PERIDEX) 0.12 % solution 15 mL     chlorhexidine (HIBICLENS) 4 % liquid     lidocaine (LIDODERM) 5 % Patch 1 patch    And     lidocaine (LIDODERM) patch REMOVAL    And     lidocaine (LIDODERM) patch in PLACE     lidocaine 1 % 0.5-5 mL     lidocaine (LMX4) kit     sodium chloride (PF) 0.9% PF flush 5-50 mL     lidocaine 1 % 1 mL     lidocaine (LMX4) kit     sodium chloride (PF) 0.9% PF flush 10-20 mL     heparin lock flush 10 UNIT/ML injection 5-10 mL     heparin lock flush 10 UNIT/ML injection 5-10 mL     benzocaine-menthol (CHLORASEPTIC) 6-10 MG lozenge 1 lozenge     metoprolol (LOPRESSOR) suspension 25 mg     sodium chloride (PF) 0.9% PF flush 10-20 mL     sodium chloride (PF) 0.9% PF flush 10 mL     magnesium sulfate 2 g in NS intermittent infusion (PharMEDium or FV Cmpd)     magnesium sulfate 4 g in 100 mL sterile water (premade)     potassium phosphate 15 mmol in D5W 250 mL intermittent infusion     potassium phosphate 20 mmol in D5W 500 mL intermittent infusion     potassium phosphate 20 mmol in D5W 250 mL intermittent infusion     potassium phosphate 25 mmol in D5W 500 mL intermittent infusion     sodium chloride (PF) 0.9% PF flush 3 mL     sodium chloride (PF) 0.9% PF flush 3 mL     traZODone (DESYREL) tablet 100 mg     diphenoxylate-atropine (LOMOTIL) liquid 10 mL     loperamide (IMODIUM) liquid 4 mg     pantoprazole (PROTONIX) suspension 40 mg     prochlorperazine (COMPAZINE) tablet 5 mg    Or     prochlorperazine (COMPAZINE) injection 5 mg    Or     prochlorperazine (COMPAZINE) Suppository 12.5 mg     dextrose 10 % 1,000 mL infusion     glucose 40 % gel 15-30 g    Or     dextrose 50 % injection 25-50 mL    Or     glucagon injection 1 mg     artificial saliva (BIOTENE DRY MOUTHWASH) liquid 20 mL      ipratropium - albuterol 0.5 mg/2.5 mg/3 mL (DUONEB) neb solution 3 mL     labetalol (NORMODYNE/TRANDATE) injection 10-40 mg     albuterol (PROAIR HFA/PROVENTIL HFA/VENTOLIN HFA) Inhaler 4 puff     albuterol neb solution 2.5 mg     sodium chloride (PF) 0.9% PF flush 3 mL     sodium chloride (PF) 0.9% PF flush 3 mL     naloxone (NARCAN) injection 0.1-0.4 mg     ondansetron (ZOFRAN-ODT) ODT tab 4 mg    Or     ondansetron (ZOFRAN) injection 4 mg     ferrous sulfate 300 (60 FE) MG/5ML syrup 300 mg     Facility-Administered Medications Ordered in Other Encounters   Medication     bupivacaine 0.25 % - EPINEPHrine 1:200,000 injection              Physical Exam:   Vitals were reviewed  Temp: 96.4  F (35.8  C) Temp src: Oral BP: 101/41 Pulse: 64 Heart Rate: 79 Resp: 16 SpO2: 97 % O2 Device: None (Room air) Oxygen Delivery: 2 LPM  Gen: Appears comfortable  Resp: normal work of breathing  Msk: no gross deformity   Skin:  no jaundice  Ext: warm, well perfused. no edema  Neuro:  EOM, vision, Speech fluent. Moves all extremities equally  Mental status/Psych: alert. Oriented. Asks/answers questions appropriately. Affect is normal             Data Reviewed:   None

## 2017-06-28 NOTE — PLAN OF CARE
Problem: Goal Outcome Summary  Goal: Goal Outcome Summary  patient requested Oxycodone 20mg with stated pain relief to neck/upper abdomin. Noted stool less watery,less amount.Tolerated t-fdg.J1jovhi, total 100ml..TPN continuous.Tolerated scheduled medication via j-tube.Voiced feelings of frustration to status with it's delayed recovery.Magnesium replacement per orders.continue to monitor.

## 2017-06-29 ENCOUNTER — CARE COORDINATION (OUTPATIENT)
Dept: CARE COORDINATION | Facility: CLINIC | Age: 72
End: 2017-06-29

## 2017-06-29 ENCOUNTER — HOSPITAL ENCOUNTER (INPATIENT)
Facility: SKILLED NURSING FACILITY | Age: 72
LOS: 8 days | Discharge: HOME HEALTH | DRG: 949 | End: 2017-07-07
Attending: INTERNAL MEDICINE | Admitting: HOSPITALIST
Payer: MEDICARE

## 2017-06-29 VITALS
TEMPERATURE: 96 F | SYSTOLIC BLOOD PRESSURE: 127 MMHG | HEIGHT: 60 IN | HEART RATE: 81 BPM | DIASTOLIC BLOOD PRESSURE: 58 MMHG | WEIGHT: 90.9 LBS | RESPIRATION RATE: 18 BRPM | OXYGEN SATURATION: 96 % | BODY MASS INDEX: 17.85 KG/M2

## 2017-06-29 DIAGNOSIS — K22.3 ESOPHAGEAL PERFORATION: ICD-10-CM

## 2017-06-29 DIAGNOSIS — R19.7 DIARRHEA: Primary | ICD-10-CM

## 2017-06-29 DIAGNOSIS — G89.18 ACUTE POST-OPERATIVE PAIN: ICD-10-CM

## 2017-06-29 DIAGNOSIS — I48.91 ATRIAL FIBRILLATION, UNSPECIFIED TYPE (H): ICD-10-CM

## 2017-06-29 DIAGNOSIS — I48.0 PAROXYSMAL ATRIAL FIBRILLATION (H): ICD-10-CM

## 2017-06-29 DIAGNOSIS — R19.4 BOWEL HABIT CHANGES: ICD-10-CM

## 2017-06-29 DIAGNOSIS — K21.9 GASTROESOPHAGEAL REFLUX DISEASE, ESOPHAGITIS PRESENCE NOT SPECIFIED: ICD-10-CM

## 2017-06-29 LAB
ANION GAP SERPL CALCULATED.3IONS-SCNC: 5 MMOL/L (ref 3–14)
BUN SERPL-MCNC: 23 MG/DL (ref 7–30)
CALCIUM SERPL-MCNC: 8.6 MG/DL (ref 8.5–10.1)
CHLORIDE SERPL-SCNC: 103 MMOL/L (ref 94–109)
CO2 SERPL-SCNC: 27 MMOL/L (ref 20–32)
CREAT SERPL-MCNC: 0.37 MG/DL (ref 0.52–1.04)
ERYTHROCYTE [DISTWIDTH] IN BLOOD BY AUTOMATED COUNT: 16 % (ref 10–15)
GFR SERPL CREATININE-BSD FRML MDRD: ABNORMAL ML/MIN/1.7M2
GLUCOSE SERPL-MCNC: 87 MG/DL (ref 70–99)
HCT VFR BLD AUTO: 30.3 % (ref 35–47)
HGB BLD-MCNC: 9.3 G/DL (ref 11.7–15.7)
MAGNESIUM SERPL-MCNC: 1.8 MG/DL (ref 1.6–2.3)
MCH RBC QN AUTO: 29.1 PG (ref 26.5–33)
MCHC RBC AUTO-ENTMCNC: 30.7 G/DL (ref 31.5–36.5)
MCV RBC AUTO: 95 FL (ref 78–100)
PHOSPHATE SERPL-MCNC: 4.3 MG/DL (ref 2.5–4.5)
PLATELET # BLD AUTO: 391 10E9/L (ref 150–450)
POTASSIUM SERPL-SCNC: 4.1 MMOL/L (ref 3.4–5.3)
RBC # BLD AUTO: 3.2 10E12/L (ref 3.8–5.2)
SODIUM SERPL-SCNC: 135 MMOL/L (ref 133–144)
WBC # BLD AUTO: 8.2 10E9/L (ref 4–11)

## 2017-06-29 PROCEDURE — 85027 COMPLETE CBC AUTOMATED: CPT | Performed by: SURGERY

## 2017-06-29 PROCEDURE — A9270 NON-COVERED ITEM OR SERVICE: HCPCS | Mod: GY | Performed by: INTERNAL MEDICINE

## 2017-06-29 PROCEDURE — 3E0436Z INTRODUCTION OF NUTRITIONAL SUBSTANCE INTO CENTRAL VEIN, PERCUTANEOUS APPROACH: ICD-10-PCS | Performed by: INTERNAL MEDICINE

## 2017-06-29 PROCEDURE — 12000022 ZZH R&B SNF

## 2017-06-29 PROCEDURE — A9270 NON-COVERED ITEM OR SERVICE: HCPCS | Mod: GY | Performed by: PHYSICIAN ASSISTANT

## 2017-06-29 PROCEDURE — 99309 SBSQ NF CARE MODERATE MDM 30: CPT | Performed by: NURSE PRACTITIONER

## 2017-06-29 PROCEDURE — 25000132 ZZH RX MED GY IP 250 OP 250 PS 637: Mod: GY | Performed by: INTERNAL MEDICINE

## 2017-06-29 PROCEDURE — 25000132 ZZH RX MED GY IP 250 OP 250 PS 637: Mod: GY | Performed by: STUDENT IN AN ORGANIZED HEALTH CARE EDUCATION/TRAINING PROGRAM

## 2017-06-29 PROCEDURE — A9270 NON-COVERED ITEM OR SERVICE: HCPCS | Mod: GY | Performed by: STUDENT IN AN ORGANIZED HEALTH CARE EDUCATION/TRAINING PROGRAM

## 2017-06-29 PROCEDURE — 25000125 ZZHC RX 250: Performed by: NURSE PRACTITIONER

## 2017-06-29 PROCEDURE — 80048 BASIC METABOLIC PNL TOTAL CA: CPT | Performed by: SURGERY

## 2017-06-29 PROCEDURE — 25000132 ZZH RX MED GY IP 250 OP 250 PS 637: Mod: GY | Performed by: SURGERY

## 2017-06-29 PROCEDURE — 25000125 ZZHC RX 250: Performed by: PHYSICIAN ASSISTANT

## 2017-06-29 PROCEDURE — 25000125 ZZHC RX 250: Performed by: SURGERY

## 2017-06-29 PROCEDURE — 83735 ASSAY OF MAGNESIUM: CPT | Performed by: SURGERY

## 2017-06-29 PROCEDURE — A9270 NON-COVERED ITEM OR SERVICE: HCPCS | Mod: GY | Performed by: NURSE PRACTITIONER

## 2017-06-29 PROCEDURE — 25000132 ZZH RX MED GY IP 250 OP 250 PS 637: Mod: GY | Performed by: NURSE PRACTITIONER

## 2017-06-29 PROCEDURE — 27210429 ZZH NUTRITION PRODUCT INTERMEDIATE LITER

## 2017-06-29 PROCEDURE — 36592 COLLECT BLOOD FROM PICC: CPT | Performed by: SURGERY

## 2017-06-29 PROCEDURE — 25000132 ZZH RX MED GY IP 250 OP 250 PS 637: Mod: GY | Performed by: PHYSICIAN ASSISTANT

## 2017-06-29 PROCEDURE — 25000128 H RX IP 250 OP 636: Performed by: STUDENT IN AN ORGANIZED HEALTH CARE EDUCATION/TRAINING PROGRAM

## 2017-06-29 PROCEDURE — 25000125 ZZHC RX 250: Performed by: INTERNAL MEDICINE

## 2017-06-29 PROCEDURE — 84100 ASSAY OF PHOSPHORUS: CPT | Performed by: SURGERY

## 2017-06-29 PROCEDURE — A9270 NON-COVERED ITEM OR SERVICE: HCPCS | Mod: GY | Performed by: SURGERY

## 2017-06-29 RX ORDER — LOPERAMIDE HYDROCHLORIDE 1 MG/5ML
4 SOLUTION ORAL 4 TIMES DAILY
Status: DISCONTINUED | OUTPATIENT
Start: 2017-06-29 | End: 2017-06-30

## 2017-06-29 RX ORDER — SACCHAROMYCES BOULARDII 250 MG
250 CAPSULE ORAL 2 TIMES DAILY
Status: DISCONTINUED | OUTPATIENT
Start: 2017-06-29 | End: 2017-07-07 | Stop reason: HOSPADM

## 2017-06-29 RX ORDER — DIPHENOXYLATE HCL/ATROPINE 2.5-.025/5
10 LIQUID (ML) ORAL 4 TIMES DAILY
Status: DISCONTINUED | OUTPATIENT
Start: 2017-06-29 | End: 2017-06-29

## 2017-06-29 RX ORDER — SIMETHICONE 40MG/0.6ML
40 SUSPENSION, DROPS(FINAL DOSAGE FORM)(ML) ORAL 4 TIMES DAILY
Status: DISCONTINUED | OUTPATIENT
Start: 2017-06-29 | End: 2017-07-07 | Stop reason: HOSPADM

## 2017-06-29 RX ORDER — OXYCODONE HYDROCHLORIDE 10 MG/1
TABLET ORAL
Qty: 60 TABLET | Refills: 0 | Status: SHIPPED | DISCHARGE
Start: 2017-06-29 | End: 2017-06-29

## 2017-06-29 RX ORDER — NALOXONE HYDROCHLORIDE 0.4 MG/ML
.1-.4 INJECTION, SOLUTION INTRAMUSCULAR; INTRAVENOUS; SUBCUTANEOUS
Status: DISCONTINUED | OUTPATIENT
Start: 2017-06-29 | End: 2017-07-07 | Stop reason: HOSPADM

## 2017-06-29 RX ORDER — GUAR GUM
2 PACKET (EA) ORAL
Status: DISCONTINUED | OUTPATIENT
Start: 2017-06-29 | End: 2017-07-07 | Stop reason: HOSPADM

## 2017-06-29 RX ORDER — BISACODYL 10 MG
10 SUPPOSITORY, RECTAL RECTAL 2 TIMES DAILY PRN
Status: DISCONTINUED | OUTPATIENT
Start: 2017-06-29 | End: 2017-07-07 | Stop reason: HOSPADM

## 2017-06-29 RX ORDER — DIPHENOXYLATE HCL/ATROPINE 2.5-.025/5
10 LIQUID (ML) ORAL 4 TIMES DAILY
Status: DISCONTINUED | OUTPATIENT
Start: 2017-06-29 | End: 2017-07-07 | Stop reason: HOSPADM

## 2017-06-29 RX ORDER — OXYCODONE HCL 5 MG/5 ML
10-20 SOLUTION, ORAL ORAL
Qty: 100 ML | Refills: 0 | Status: ON HOLD | OUTPATIENT
Start: 2017-06-29 | End: 2017-07-06

## 2017-06-29 RX ORDER — LIDOCAINE 40 MG/G
CREAM TOPICAL
Status: DISCONTINUED | OUTPATIENT
Start: 2017-06-29 | End: 2017-07-07 | Stop reason: HOSPADM

## 2017-06-29 RX ORDER — LIDOCAINE 50 MG/G
1-2 PATCH TOPICAL
Status: DISCONTINUED | OUTPATIENT
Start: 2017-06-29 | End: 2017-07-07 | Stop reason: HOSPADM

## 2017-06-29 RX ORDER — DIPHENHYDRAMINE HCL 25 MG
25 CAPSULE ORAL EVERY 6 HOURS PRN
Status: DISCONTINUED | OUTPATIENT
Start: 2017-06-29 | End: 2017-07-07 | Stop reason: HOSPADM

## 2017-06-29 RX ORDER — HEPARIN SODIUM,PORCINE 10 UNIT/ML
5-10 VIAL (ML) INTRAVENOUS
Status: DISCONTINUED | OUTPATIENT
Start: 2017-06-29 | End: 2017-07-07 | Stop reason: HOSPADM

## 2017-06-29 RX ORDER — OXYCODONE HCL 5 MG/5 ML
10-20 SOLUTION, ORAL ORAL
Qty: 100 ML | Refills: 0 | Status: SHIPPED | OUTPATIENT
Start: 2017-06-29 | End: 2017-06-29

## 2017-06-29 RX ORDER — CYCLOBENZAPRINE HCL 5 MG
10 TABLET ORAL ONCE
Status: COMPLETED | OUTPATIENT
Start: 2017-06-29 | End: 2017-06-29

## 2017-06-29 RX ORDER — CHLORHEXIDINE GLUCONATE ORAL RINSE 1.2 MG/ML
15 SOLUTION DENTAL 3 TIMES DAILY
Status: DISCONTINUED | OUTPATIENT
Start: 2017-06-29 | End: 2017-07-01

## 2017-06-29 RX ORDER — OXYCODONE HCL 5 MG/5 ML
10-20 SOLUTION, ORAL ORAL
Qty: 100 ML | Refills: 0 | Status: SHIPPED | DISCHARGE
Start: 2017-06-29 | End: 2017-06-29

## 2017-06-29 RX ORDER — OXYCODONE HYDROCHLORIDE 10 MG/1
TABLET ORAL
Qty: 60 TABLET | Refills: 0 | Status: ON HOLD | OUTPATIENT
Start: 2017-06-29 | End: 2017-07-06

## 2017-06-29 RX ORDER — LOPERAMIDE HYDROCHLORIDE 1 MG/5ML
4 SOLUTION ORAL 4 TIMES DAILY
Status: DISCONTINUED | OUTPATIENT
Start: 2017-06-29 | End: 2017-06-29

## 2017-06-29 RX ORDER — OXYCODONE HCL 5 MG/5 ML
10-20 SOLUTION, ORAL ORAL
Status: DISCONTINUED | OUTPATIENT
Start: 2017-06-29 | End: 2017-07-07 | Stop reason: HOSPADM

## 2017-06-29 RX ORDER — HEPARIN SODIUM,PORCINE 10 UNIT/ML
5-10 VIAL (ML) INTRAVENOUS EVERY 24 HOURS
Status: DISCONTINUED | OUTPATIENT
Start: 2017-06-29 | End: 2017-07-07 | Stop reason: HOSPADM

## 2017-06-29 RX ORDER — GABAPENTIN 250 MG/5ML
300 SOLUTION ORAL EVERY 8 HOURS
Status: DISCONTINUED | OUTPATIENT
Start: 2017-06-29 | End: 2017-06-30

## 2017-06-29 RX ORDER — CHOLESTYRAMINE 4 G/9G
1 POWDER, FOR SUSPENSION ORAL 2 TIMES DAILY
Status: DISCONTINUED | OUTPATIENT
Start: 2017-06-29 | End: 2017-06-30

## 2017-06-29 RX ORDER — BENZOCAINE/MENTHOL 6 MG-10 MG
LOZENGE MUCOUS MEMBRANE 2 TIMES DAILY
Status: DISCONTINUED | OUTPATIENT
Start: 2017-06-29 | End: 2017-07-07 | Stop reason: HOSPADM

## 2017-06-29 RX ORDER — TRAZODONE HYDROCHLORIDE 50 MG/1
100 TABLET, FILM COATED ORAL AT BEDTIME
Status: DISCONTINUED | OUTPATIENT
Start: 2017-06-29 | End: 2017-07-01

## 2017-06-29 RX ORDER — FENTANYL 100 UG/1
100 PATCH TRANSDERMAL
Status: COMPLETED | OUTPATIENT
Start: 2017-06-29 | End: 2017-07-05

## 2017-06-29 RX ORDER — GABAPENTIN 250 MG/5ML
300 SOLUTION ORAL EVERY 8 HOURS
Status: DISCONTINUED | OUTPATIENT
Start: 2017-06-29 | End: 2017-06-29

## 2017-06-29 RX ORDER — FENTANYL 25 UG/1
25 PATCH TRANSDERMAL
Status: DISCONTINUED | OUTPATIENT
Start: 2017-06-29 | End: 2017-06-30

## 2017-06-29 RX ORDER — SIMETHICONE 40MG/0.6ML
40 SUSPENSION, DROPS(FINAL DOSAGE FORM)(ML) ORAL 4 TIMES DAILY
DISCHARGE
Start: 2017-06-29 | End: 2017-08-24

## 2017-06-29 RX ADMIN — Medication 6 MG: at 10:14

## 2017-06-29 RX ADMIN — CHOLESTYRAMINE 4 G: 4 POWDER, FOR SUSPENSION ORAL at 10:08

## 2017-06-29 RX ADMIN — METOPROLOL TARTRATE 25 MG: 100 TABLET ORAL at 07:47

## 2017-06-29 RX ADMIN — CHLORHEXIDINE GLUCONATE 15 ML: 1.2 RINSE ORAL at 21:36

## 2017-06-29 RX ADMIN — OXYCODONE HYDROCHLORIDE 20 MG: 5 SOLUTION ORAL at 14:32

## 2017-06-29 RX ADMIN — POTASSIUM CHLORIDE: 2 INJECTION, SOLUTION, CONCENTRATE INTRAVENOUS at 20:38

## 2017-06-29 RX ADMIN — GABAPENTIN 300 MG: 250 SOLUTION ORAL at 21:46

## 2017-06-29 RX ADMIN — MINERAL SUPPLEMENT IRON 300 MG / 5 ML STRENGTH LIQUID 100 PER BOX UNFLAVORED 300 MG: at 07:48

## 2017-06-29 RX ADMIN — OXYCODONE HYDROCHLORIDE 15 MG: 5 SOLUTION ORAL at 10:49

## 2017-06-29 RX ADMIN — CHLORHEXIDINE GLUCONATE 15 ML: 1.2 RINSE ORAL at 07:47

## 2017-06-29 RX ADMIN — LOPERAMIDE HYDROCHLORIDE 4 MG: 1 SOLUTION ORAL at 21:31

## 2017-06-29 RX ADMIN — OXYCODONE HYDROCHLORIDE 15 MG: 5 SOLUTION ORAL at 21:39

## 2017-06-29 RX ADMIN — Medication 10 ML: at 07:42

## 2017-06-29 RX ADMIN — SIMETHICONE 40 MG: 20 SUSPENSION/ DROPS ORAL at 07:50

## 2017-06-29 RX ADMIN — LOPERAMIDE HYDROCHLORIDE 4 MG: 1 SOLUTION ORAL at 17:33

## 2017-06-29 RX ADMIN — Medication 1 LOZENGE: at 10:20

## 2017-06-29 RX ADMIN — Medication 10 ML: at 12:00

## 2017-06-29 RX ADMIN — ACETAMINOPHEN 975 MG: 325 TABLET, FILM COATED ORAL at 00:51

## 2017-06-29 RX ADMIN — LOPERAMIDE HYDROCHLORIDE 4 MG: 1 SOLUTION ORAL at 12:00

## 2017-06-29 RX ADMIN — OXYCODONE HYDROCHLORIDE 15 MG: 5 SOLUTION ORAL at 07:41

## 2017-06-29 RX ADMIN — TRAZODONE HYDROCHLORIDE 100 MG: 50 TABLET ORAL at 21:40

## 2017-06-29 RX ADMIN — Medication 10 ML: at 21:35

## 2017-06-29 RX ADMIN — CHLORHEXIDINE GLUCONATE 15 ML: 1.2 RINSE ORAL at 17:51

## 2017-06-29 RX ADMIN — PANTOPRAZOLE SODIUM 40 MG: 40 TABLET, DELAYED RELEASE ORAL at 17:50

## 2017-06-29 RX ADMIN — LIDOCAINE 2 PATCH: 50 PATCH CUTANEOUS at 17:25

## 2017-06-29 RX ADMIN — Medication 10 ML: at 17:34

## 2017-06-29 RX ADMIN — SIMETHICONE 40 MG: 20 SUSPENSION/ DROPS ORAL at 17:33

## 2017-06-29 RX ADMIN — Medication 250 MG: at 21:40

## 2017-06-29 RX ADMIN — ANTISEPTIC SURGICAL SCRUB: 0.04 SOLUTION TOPICAL at 21:36

## 2017-06-29 RX ADMIN — ACETAMINOPHEN 975 MG: 325 TABLET, FILM COATED ORAL at 07:47

## 2017-06-29 RX ADMIN — DIPHENHYDRAMINE HYDROCHLORIDE 25 MG: 25 CAPSULE ORAL at 21:40

## 2017-06-29 RX ADMIN — METOPROLOL TARTRATE 25 MG: 100 TABLET, FILM COATED ORAL at 21:45

## 2017-06-29 RX ADMIN — HYDROCORTISONE: 10 CREAM TOPICAL at 21:36

## 2017-06-29 RX ADMIN — SIMETHICONE 40 MG: 20 SUSPENSION/ DROPS ORAL at 12:01

## 2017-06-29 RX ADMIN — OXYCODONE HYDROCHLORIDE 10 MG: 5 SOLUTION ORAL at 04:39

## 2017-06-29 RX ADMIN — SODIUM CHLORIDE, PRESERVATIVE FREE 5 ML: 5 INJECTION INTRAVENOUS at 17:37

## 2017-06-29 RX ADMIN — SIMETHICONE 40 MG: 20 SUSPENSION/ DROPS ORAL at 21:36

## 2017-06-29 RX ADMIN — GABAPENTIN 300 MG: 250 SOLUTION ORAL at 07:48

## 2017-06-29 RX ADMIN — OXYCODONE HYDROCHLORIDE 10 MG: 5 SOLUTION ORAL at 00:51

## 2017-06-29 RX ADMIN — SODIUM CHLORIDE, PRESERVATIVE FREE 5 ML: 5 INJECTION INTRAVENOUS at 07:13

## 2017-06-29 RX ADMIN — Medication 250 MG: at 07:48

## 2017-06-29 RX ADMIN — ENOXAPARIN SODIUM 40 MG: 40 INJECTION SUBCUTANEOUS at 12:01

## 2017-06-29 RX ADMIN — FENTANYL 1 PATCH: 100 PATCH, EXTENDED RELEASE TRANSDERMAL at 17:28

## 2017-06-29 RX ADMIN — Medication 2 PACKET: at 07:48

## 2017-06-29 RX ADMIN — FENTANYL 1 PATCH: 25 PATCH, EXTENDED RELEASE TRANSDERMAL at 17:27

## 2017-06-29 RX ADMIN — BACITRACIN ZINC: 500 OINTMENT TOPICAL at 07:50

## 2017-06-29 RX ADMIN — CYCLOBENZAPRINE HYDROCHLORIDE 10 MG: 5 TABLET, FILM COATED ORAL at 17:34

## 2017-06-29 RX ADMIN — OXYCODONE HYDROCHLORIDE 20 MG: 5 SOLUTION ORAL at 17:49

## 2017-06-29 RX ADMIN — LOPERAMIDE HYDROCHLORIDE 4 MG: 1 SOLUTION ORAL at 07:47

## 2017-06-29 RX ADMIN — ACETAMINOPHEN 975 MG: 160 SUSPENSION ORAL at 21:34

## 2017-06-29 ASSESSMENT — ACTIVITIES OF DAILY LIVING (ADL)
FALL_HISTORY_WITHIN_LAST_SIX_MONTHS: NO
COGNITION: 0 - NO COGNITION ISSUES REPORTED
SWALLOWING: 0-->SWALLOWS FOODS/LIQUIDS WITHOUT DIFFICULTY

## 2017-06-29 NOTE — PROGRESS NOTES
Social Work Services Discharge Note      Patient Name:  Minerva Blanco     Anticipated Discharge Date:  6/29/17    Discharge Disposition:   TCU:   TCU   [address] 4th floor  [phone]     Pre-Admission Screening (PAS) online form has been completed.  The Level of Care (LOC) is:  Determined  Confirmation Code is:  [id number]  Patient/caregiver informed of referral to [facility] for Pre-Admission Screening for skilled nursing facility (SNF) placement and to expect a phone call post discharge from SNF.     Additional Services/Equipment Arranged:  Pt's  is transporting pt so no alternate transportation has been arranged.      Patient / Family response to discharge plan:  Pt is agreeable     Persons notified of above discharge plan:  Pt, pt reported she will call her  and update him, bedside nurse, charge nurse, NST, receiving facility, Thoracic team    Staff Discharge Instructions:  Please fax discharge orders and signed hard scripts for any controlled substances.  Please print a packet and send with patient.     CTS Handoff completed:  YES    Medicare Notice of Rights provided to the patient/family:  YES    BALJIT Oden, MSW  7B   [phone] (pager) [phone]  6/29/2017

## 2017-06-29 NOTE — PLAN OF CARE
Problem: Goal Outcome Summary  Goal: Goal Outcome Summary  Outcome: No Change  Afebrile, VSS, pain at tolerable level with prn pain meds & denies N/V. NPO, pharyngostomy to LIS with greenish output & flushed q shift. TF at goal rate at 35 ml/hr & cycled TPN. J tube with meds & dressing cdi. Anxious this morning & very emotional. Anticipated discharge to TCU since yesterday & transport with  at 1 pm. Up to commode throughout the day, Up ad renea & continuous to have watery stools. Mid chest dressing with packing & performed by team am rounds. Spouse will be here around noon. Pt getting ready for discharge.

## 2017-06-29 NOTE — IP AVS SNAPSHOT
TR TRANSITIONAL CARE: [phone]                                              INTERAGENCY TRANSFER FORM - PHYSICIAN ORDERS   2017                    Hospital Admission Date: 2017  FAVIAN MALONE   : 1945  Sex: Female        Attending Provider: Miroslava Santa MD     Allergies:  Amoxicillin, Ativan [Lorazepam], Hydromorphone, Morphine    Infection:  None   Service:  SKILLED NURS    Ht:  1.524 m (5')   Wt:  40.3 kg (88 lb 12.8 oz)   Admission Wt:  41.3 kg (91 lb)    BMI:  17.34 kg/m 2   BSA:  1.31 m 2            Patient PCP Information     Provider PCP Type    Andrea Nino MD General      ED Clinical Impression     Diagnosis Description Comment Added By Time Added    Diarrhea [R19.7] Diarrhea [R19.7]  [facility] 12[facility]45 [facility]    Paroxysmal atrial fibrillation (H) [I48.0] Paroxysmal atrial fibrillation (H) [I48.0]  Alma Rosa Page [facility] 2017 12:51 PM    Gastroesophageal reflux disease, esophagitis presence not specified [K21.9] Gastroesophageal reflux disease, esophagitis presence not specified [K21.9]  Alma Rosa Page [facility] 2017 12:51 PM    Esophageal perforation [K22.3] Esophageal perforation [K22.3]  Carlos Montilla, FROILAN 2017 10:41 AM    Acute post-operative pain [G89.18] Acute post-operative pain [G89.18]  Wendy Storey PA-C 2017 11:10 AM    Atrial fibrillation, unspecified type (H) [I48.91] Atrial fibrillation, unspecified type (H) [I48.91]  Wendy Storey PA-C 2017 11:11 AM    Bowel habit changes [R19.4] Bowel habit changes [R19.4]  Wendy Storey PA-C 2017 11:11 AM      Hospital Problems as of 2017     None      Non-Hospital Problems as of 2017              Priority Class Noted    Abscess Medium  2016    Esophageal perforation Medium  2016    Esophageal stricture Medium  2016    Mediastinitis Medium  2016    Gastroesophageal reflux disease, esophagitis presence not specified  Medium  10/21/2016    Paroxysmal atrial fibrillation (H) Medium  2/21/2017    Long-term (current) use of anticoagulants [Z79.01] Medium  2/22/2017    Atrial fibrillation (H) [I48.91] Medium  2/22/2017    Hx of esophagectomy Medium  4/17/2017    History of esophagectomy Medium  4/17/2017    Pneumonia Medium  5/3/2017    Advance Care Planning   5/3/2017    Neck abscess Medium  5/18/2017    Esophageal anastomotic leak Medium  5/19/2017      Code Status History     Date Active Date Inactive Code Status Order ID Comments User Context    7/6/2017 10:47 AM  Full Code [id number]  Wendy Storey PA-C Outpatient    6/29/2017  2:21 PM 7/6/2017 10:47 AM Full Code [id number]  France Shen APRN CNP Inpatient    6/29/2017  9:17 AM 6/29/2017  2:21 PM Full Code [id number]  Ramakrishna Ornelas MD Outpatient    5/19/2017  8:17 PM 5/22/2017  5:51 PM Full Code [id number]  Viki Carmen MD Inpatient    5/19/2017  8:17 PM 5/19/2017  8:17 PM Full Code [id number]  Jd Garcia MD Inpatient    4/17/2017  7:41 PM 4/28/2017  3:58 PM Full Code [id number]  Arslan Wisdom MD Inpatient    9/5/2016  5:48 AM 4/17/2017  7:41 PM Full Code [id number]  Janae Montes MD Outpatient    8/27/2016 10:35 AM 9/5/2016  5:48 AM Full Code [id number]  Jalen Mcadams MD Outpatient    8/25/2016  5:19 PM 8/27/2016 10:35 AM Full Code [id number]  Jalen Mcadams MD Inpatient    7/12/2016  3:51 PM 7/13/2016  4:37 PM Full Code [id number]  Justice Hill MD Inpatient    5/18/2016 10:47 AM 7/12/2016  3:51 PM Full Code [id number]  Anant Melendrez MD Outpatient    4/27/2016  8:07 AM 5/18/2016 10:47 AM Full Code [id number]  Blas Rubio MD Outpatient    4/18/2016  6:38 PM 4/27/2016  8:07 AM Full Code [id number]  Blas Rubio MD Inpatient    4/16/2016  1:19 AM 4/18/2016  6:38 PM Full Code [id number]  Faisal Dolan MD Inpatient         Medication Review      START taking        Dose / Directions Comments    acetaminophen 32 mg/mL solution    Commonly known as:  TYLENOL   Indication:  Pain   Used for:  Esophageal perforation        Dose:  975 mg   30.45 mLs (975 mg) by Per Feeding Tube route 3 times daily   Quantity:  300 mL   Refills:  0        chlorhexidine 0.12 % solution   Commonly known as:  PERIDEX   Indication:  Tooth Plaque   Used for:  Esophageal perforation        Dose:  15 mL   Swish and spit 15 mLs in mouth 2 times daily   Refills:  0        fentaNYL 75 mcg/hr 72 hr patch   Commonly known as:  DURAGESIC   Used for:  Esophageal perforation, Acute post-operative pain        Dose:  1 patch   Start taking on:  7/8/2017   Place 1 patch onto the skin every 72 hours   Quantity:  5 patch   Refills:  0        pantoprazole 40 MG EC tablet   Commonly known as:  PROTONIX   Used for:  Gastroesophageal reflux disease, esophagitis presence not specified        Take by mouth 30-60 minutes before a meal.   Quantity:  30 tablet   Refills:  0          CONTINUE these medications which may have CHANGED, or have new prescriptions. If we are uncertain of the size of tablets/capsules you have at home, strength may be listed as something that might have changed.        Dose / Directions Comments    loperamide 1 MG/5ML liquid   Commonly known as:  IMODIUM   This may have changed:  when to take this   Used for:  Bowel habit changes        Dose:  4 mg   Take 20 mLs (4 mg) by mouth 4 times daily as needed for diarrhea   Quantity:  200 mL   Refills:  0        metoprolol 10 mg/mL Susp   Commonly known as:  LOPRESSOR   This may have changed:  how much to take   Used for:  Atrial fibrillation, unspecified type (H)        Dose:  12.5 mg   1.25 mLs (12.5 mg) by Per J Tube route 2 times daily   Refills:  0        opium tincture 10 MG/ML (1%) liquid   This may have changed:    - when to take this  - reasons to take this   Used for:  Bowel habit changes        Dose:  0.6 mL   Take 0.6 mLs (6 mg) by mouth every 6 hours as needed for diarrhea or moderate pain   Quantity:  118 mL    Refills:  0        oxyCODONE 10 MG IR tablet   Commonly known as:  ROXICODONE   This may have changed:    - how much to take  - how to take this  - when to take this  - reasons to take this  - additional instructions  - Another medication with the same name was removed. Continue taking this medication, and follow the directions you see here.   Used for:  Acute post-operative pain        Dose:  10 mg   Take 1 tablet (10 mg) by mouth every 4 hours as needed for moderate to severe pain   Quantity:  60 tablet   Refills:  0        traZODone 100 MG tablet   Commonly known as:  DESYREL   This may have changed:  how much to take   Used for:  Insomnia, unspecified type        Dose:  50 mg   0.5 tablets (50 mg) by Per G Tube route At Bedtime   Quantity:  30 tablet   Refills:  0          CONTINUE these medications which have NOT CHANGED        Dose / Directions Comments    BENADRYL PO        Dose:  25 mg   Take 25 mg by mouth nightly as needed   Refills:  0        chlorhexidine 4 % liquid   Commonly known as:  HIBICLENS   Used for:  History of esophagectomy        Apply topically 2 times daily   Quantity:  200 mL   Refills:  0        cholestyramine 4 G Packet   Commonly known as:  QUESTRAN        Dose:  1 packet   Take 1 packet by mouth daily   Refills:  0        CULTURELLE PO        Dose:  1 tablet   Take 1 tablet by mouth 2 times daily   Refills:  0        diphenoxylate-atropine 2.5-0.025 MG/5ML liquid   Commonly known as:  LOMOTIL   Used for:  Bowel habit changes        Dose:  5 mL   Take 5 mLs by mouth 4 times daily as needed for diarrhea   Quantity:  300 mL   Refills:  0        ferrous sulfate 300 (60 FE) MG/5ML syrup   Used for:  Anemia due to other cause        Dose:  300 mg   5 mLs (300 mg) by Per J Tube route daily   Quantity:  150 mL   Refills:  0        fiber modular packet   Used for:  History of esophagectomy        Dose:  1 packet   1 packet by Per Feeding Tube route 3 times daily (with meals)   Quantity:  60  packet   Refills:  0        gabapentin 250 MG/5ML solution   Commonly known as:  NEURONTIN   Used for:  Acute post-operative pain        Dose:  300 mg   Take 6 mLs (300 mg) by mouth every 8 hours   Quantity:  550 mL   Refills:  0        multivitamins with minerals Liqd liquid        Dose:  15 mL   Take 15 mLs by mouth daily   Refills:  0        * order for DME   Used for:  Hx of esophagectomy        Equipment being ordered:  [facility] Suction Machine-Intermittent Suction Canisters(2) Suction Canister Holders(2) Suction Connect Tube(2) 5 in 1 Connector(2) Bacteria Filter(2) Yaunkauer Suction(2)  Treatment Diagnosis: Esophogeal Perforation, s/p esophagectomy   Quantity:  1 Device   Refills:  0        * order for DME   Used for:  Esophageal perforation        Equipment being ordered:  Suction Pump Suction Canister(2) Suction Tubing(2) Bacteria Filter(2) Yaunkauer(4) Red Rubber Catheter(2)  Treatment Diagnosis: Esophogeal Perforation, s/p esophagectomy   Quantity:  1 Device   Refills:  0        simethicone 40 MG/0.6ML suspension   Commonly known as:  MYLICON   Used for:  History of esophagectomy        Dose:  40 mg   0.6 mLs (40 mg) by Per Feeding Tube route 4 times daily   Refills:  0        * Notice:  This list has 2 medication(s) that are the same as other medications prescribed for you. Read the directions carefully, and ask your doctor or other care provider to review them with you.      STOP taking     enoxaparin 40 MG/0.4ML injection   Commonly known as:  LOVENOX           ranitidine 150 MG/10ML syrup   Commonly known as:  Zantac                     Further instructions from your care team       [facility] to supply your home low intermittent suction machine and supplies [phone]    Pharyngostomy to low intermittent suction    [facility] supplies your tube feeding formula and supplies [phone]    Summary of Visit     Reason for your hospital stay       Minerva is a 72 year old female with PMH  of atrial fibrillation, breast cancer s/p lumpectomy 2007, fibromyalgia, GERD, IBS, meniere's disease, MS and symptomatic hiatal hernia. S/p Toupet fundoplication 1/2016 c/b esophageal perforation with mediastinal phlegmon s/p initial proximal gastrectomy, distal esophagectomy, spit fistula creation, left thoracotomy with mediastinal phlegmon excision on 1/9/2017. Since that time, she has undergone mulptiple procedures for management including esophageal dilations, stent placement/removal, washouts, lysis of adhesions, jejunostomy feeding tube placement, retrosternal gastric pull-through, resection of left half of the manubrium, spit fistula takedown, right tube thoracostomy and pharyngostomy tube placement. She most recently was admitted 5/18 and diagnosed with anastomic leak with mediastical abscess. S/p mediastinal wound debridement with chest tube placement (5/19) with subsequent pharyngostomy tube 5/31, stent placement 6/4 with exchange 6/27 and multiple washouts (6/2, 6/4, 6/9, 6/14, 6/27). Hospital course complicated by diarrhea (r/t TFs), surgical pain (seen by palliative). Transferred to TCU 6/29/17 for further rehab needs and attempts to advance tolerance to TFs. TFs were advanced to goal on 7/5 and TPN was stopped. Patient was discharged home 7/7/17 with plan to return to hospital on 7/17 for washout.             After Care     Activity       Your activity upon discharge: activity as tolerated. No driving while on narcotics.       Diet       Follow this diet upon discharge: Strict NPO.     Adult Formula Drip Feeding: Specified Time TwoCal HN; Jejunostomy; Goal Rate: 50; mL/hr; From: 6:00 AM; 10:00 PM; Medication - Tube Feeding Flush Frequency: At least 15-30 mL water before and after medication administration and with tube clogging    Free water: 75cc every 2 hours       Discharge Instructions       - Your fentanyl patch will be decreased to 75mcg/hr starting on 7/8, you will need to follow with your PCP  "or pain clinic or surgery for further tapering of your pain medications on discharge.  - Wound care:  R chest wound:  daily:  cleanse with Microlens pack with dry unfolded 2x2\" gauze apply No sting to surrounding skin, secure with 4x4's       Tubes and drains       You are going home with the following tubes or drains: pharyngostomy tube, continue to low intermittent suction, irrigate with 30cc every 8 hours.       Wound care and dressings       Instructions to care for your wound at home: continue hibiclens to pharyngostomy twice daily             Referrals     Home care nursing referral       RN skilled nursing visit. RN to assess vital signs and weight, respiratory and cardiac status, pain level and activity tolerance, incision for signs/symptoms of infection, hydration, nutrition and bowel status, home safety and pharyngostomy status and function, J tube status and function.  RN to teach medication management and reinforcement of wound care teaching to chest and neck drain sites.  RN to provide lab draws, if ordered.    Home care services to begin within 24-48 hours of DC from TCU    Your provider has ordered home care nursing services. If you have not been contacted within 2 days of your discharge please call the inpatient department phone number at [phone] .              MD face to face encounter       Documentation of Face to Face and Certification for Home Health Services    I certify that patient: Minerva Blanco is under my care and that I, or a nurse practitioner or physician's assistant working with me, had a face-to-face encounter that meets the physician face-to-face encounter requirements with this patient on: 7/6/2017.    This encounter with the patient was in whole, or in part, for the following medical condition, which is the primary reason for home health care: esophageal perforation with multiple complications.     I certify that, based on my findings, the following services are medically " necessary home health services: Nursing.For complex aftercare of surgical procedures because the patient needs instruction and cannot perform care on their own due to complexity: pharyngostomy tube, ongoing daily wound care, assistance with TF.     My clinical findings support the need for the above services because: Nurse is needed: .    Further, I certify that my clinical findings support that this patient is homebound (i.e. absences from home require considerable and taxing effort and are for medical reasons or [employer] services or infrequently or of short duration when for other reasons) because: Leaving home is medically contraindicated for the following reason(s): pharyngostomy tube has to be to low intermittent suction continuously.     Based on the above findings. I certify that this patient is confined to the home and needs intermittent skilled nursing care, physical therapy and/or speech therapy.  The patient is under my care, and I have initiated the establishment of the plan of care.  This patient will be followed by a physician who will periodically review the plan of care.  Physician/Provider to provide follow up care: Andrea Nino    Attending hospital physician (the Medicare certified [facility] provider): Miroslava Santa MD  Physician Signature: See electronic signature associated with these discharge orders.  Date: 7/6/2017                  Your next 10 appointments already scheduled     Jul 17, 2017   Procedure with Jens Wise MD   [facility], [place], Same Day Surgery (--)    [address] [facility] [id number] [phone]              Follow-Up Appointment Instructions     Future Labs/Procedures    Adult [facility] Follow-up and recommended labs and tests     Comments:    Continue to follow up with thoracic surgery as planned, you have a wash out scheduled for 7/17/17    Follow up with primary care provider or pain/palliative provider to discuss tapering your  narcotics    Appointments on [place] and/or [facility] (with [facility] or [facility] provider or service). Call [phone] if you haven't heard regarding these appointments within 7 days of discharge.      Follow-Up Appointment Instructions     Adult [facility] Follow-up and recommended labs and tests       Continue to follow up with thoracic surgery as planned, you have a wash out scheduled for 7/17/17    Follow up with primary care provider or pain/palliative provider to discuss tapering your narcotics    Appointments on [place] and/or [facility] (with [facility] or [facility] provider or service). Call [phone] if you haven't heard regarding these appointments within 7 days of discharge.             Statement of Approval     Ordered          07/06/17 1125  I have reviewed and agree with all the recommendations and orders detailed in this document.  EFFECTIVE NOW     Approved and electronically signed by:  Wendy Storey PA-C

## 2017-06-29 NOTE — PLAN OF CARE
Problem: Discharge Planning  Goal: Discharge Planning (Adult, OB, Behavioral, Peds)  Outcome: Adequate for Discharge Date Met:  06/29/17  Prn pain med given for abd pain mgt. TPN turned down to 40 cc/hr & will hep lock pt at 1300. Watery stool, using commode by bedside & have depends on. TF stopped for discharge at 1300. Spouse here to pick her up. Will give her ride to TCU. Continue plan till pt discharge.

## 2017-06-29 NOTE — PROGRESS NOTES
[facility]  Transitional Care Unit  Extended Progress Note           Assessment and Plan:     Minerva is a 72 year old female with PMH of atrial fibrillation, breast cancer s/p lumpectomy 2007, fibromyalgia, GERD, IBS, meniere's disease, MS and symptomatic hiatal hernia. S/p Toupet fundoplication 1/2016 c/b esophageal perforation with mediastinal phlegmon s/p initial proximal gastrectomy, distal esophagectomy, spit fistula creation, left thoracotomy with mediastinal phlegmon excision on 1/9/2017. Since that time, she has undergone mulptiple procedures for management including esophageal dilations, stent placement/removal, washouts, lysis of adhesions, jejunostomy feeding tube placement, retrosternal gastric pull-through, resection of left half of the manubrium, spit fistula takedown, right tube thoracostomy and pharyngostomy tube placement. She most recently was admitted 5/18 and diagnosed with anastomic leak with mediastical abscess. S/p mediastinal wound debridement with chest tube placement (5/19) with subsequent pharyngostomy tube 5/31, stent placement 6/4 with exchange 6/27 and multiple washouts (6/2, 6/4, 6/9, 6/14, 6/27). Hospital course complicated by diarrhea (r/t TFs), surgical pain (seen by palliative). Transferred to TCU 6/29/17 for further rehab needs and attempts to advance tolerance to TFs.     Physical deconditioning. 2/2 complicated hospital/surgical course and underlying MS.   - Not on prior MS pharmacologic therapies.   - PT/OT consulted.     Anastomotic leak with mediastinal abscess. S/p mediastinal wound debridement with chest tube placement (5/19) with subsequent pharyngostomy tube 5/31, stent placement 6/4 with exchange 6/27 and multiple washouts (6/2, 6/4, 6/9, 6/14, 6/27). No antibiotic or antifungal needs. S/p wound cultures with normal autumn, candida albicans growth. Blood cultures NG. Afebrile. No leukocytosis. Still with sternal wound requiring daily dressing changes.   -  Continue pharyngostomy tube to LIS. Irrigate with 30cc q 8 hours.   - Continue NPO status with TF, FW flush support.  - Continue protonix 40mg daily  - Continue oral cares with peridex TID swish and spit.    - Continue wound care with kerlix packing daily followed by bandage cover. WOCN consult placed. Of note, having gastric drainage from wound prior to transfer.   - Discharge back to [place] for EGD, washout 7/17 if not discharged.     Acute pain (right shoulder, surgical site). Right anterior shoulder pain 6/29 after lifting belongings. Likely Strain vs. Tendinitis based on exam.    - One time flexeril to see effect  - Heat to shoulder site prn.   - Continue pain control with fentanyl 100mcg every 72 hours (Per palliative, decrease by 12 mcg every 6 days starting on 6/30), oxycodone 10-20mg every 3 hours prn, lidoderm patch to left chest. If with escalated pain, consult palliative.     Severe malnutrition in the context of chronic illness. Transferred on TPN, TF combo 2/2 TF intolerance with inability to meet caloric needs.   - Nutrition consulted.   - Continue TPN, TFs, FWFs. Okay to wean TPN as felt appropriate from a nutrition standpoint if tolerating.   - Patient knowledgeable about TFs for d/c but would need TPN training.     Diarrhea 2/2 TFs with h/o IBS. S/p c-diff neg 5/27.   - Stool culture, CMV and pancreatic elastase pending.   - Continue lomotil QID, imodium QID, florastor BID, fiber TID, and cholestyramine therapies.   - Opium of tincture prn used at hospital. Monitor frequency, OP. Not currently ordered.   - BMP, mag, phos screens MWF.     Atrial fibrillation. Chadsvasc 2. Preivously with coumadin use but held 2/2 surgical interventions as above. Per patient, atrial fib x 1 associated with surgery and told it was 2/2 irritation around the heart. PTA on metoprolol 25mg BID but no h/o HTN. Rate currently controlled. S/p echo 1/11/17 with EF >70%, mild pulmonary HTN, biatrial enlargement, mild mitral  valve annular calcification and mild aortic valve sclerosis.   - Continue metoprolol. Per patient, no h/o HTN. [place] atrial fib was an isolated episode and would like to stop taking if able. EKG 7/2 planned. If sinus rhythm, could trial off med.   - Continue ppx lovenox. Chadsvasc risk low. High risk for aprupt clinical changes requiring surgical intervention. Urge OP PCP vs. Cardiology visit to set up holter and determine frequency of atrial fib to assist with coumadin recommendations.      Discussed with Dr. Kiet MD      Diet and/or tube feedings: NPO with TFs, TPN.  Lines, tubes, drains: PICC  DVT/GI prophylaxis: Lovenox ppx.   Indications for psychotropic medications: None.   Code status discussed on admission: Full Code         Consults:     PT/OT  WOCN  Nutrition/Pharmacy         History of Present Illness:     Minerva is a 72 year old female with PMH of atrial fibrillation, breast cancer s/p lumpectomy 2007, fibromyalgia, GERD, IBS, meniere's disease, MS and symptomatic hiatal hernia. S/p Toupet fundoplication 1/2016 c/b esophageal perforation with mediastinal phlegmon s/p initial proximal gastrectomy, distal esophagectomy, spit fistula creation, left thoracotomy with mediastinal phlegmon excision on 1/9/2017. Since that time, she has undergone mulptiple procedures for management including esophageal dilations, stent placement/removal, washouts, lysis of adhesions, jejunostomy feeding tube placement, retrosternal gastric pull-through, resection of left half of the manubrium, spit fistula takedown, right tube thoracostomy and pharyngostomy tube placement. She most recently was admitted 5/18 and diagnosed with anastomic leak with mediastical abscess. S/p mediastinal wound debridement with chest tube placement (5/19) with subsequent pharyngostomy tube 5/31, stent placement 6/4 with exchange 6/27 and multiple washouts (6/2, 6/4, 6/9, 6/14, 6/27). Hospital course complicated by diarrhea (r/t TFs), surgical pain  "(palliative care assistance). Transferred to TCU 6/29/17 for further rehab needs and attempts to advance tolerance to TFs.     Currently, patient states her right shoulder just started hurting. States the pain started after lifting up some of her belongings to come over here. No history of shoulder pain. Pain is to the anterior part of the shoulder. Not affecting movements. Pain comes and goes in short spurts with involuntary movements to her arm. Sharp and painful when present. No shoulder redness, warmth noted. Sensation to digit intact. No discoloration. Mentions PICC to this arm but PICC site is not painful. No localized swelling. No fevers.     States ongoing diarrhea from TFs. During the day, diarrhea is well controlled. Once TFs start, can have diarrhea about 15-20 times during the night. Mentions she needs a \"commode station\" as she does not have much notice to get to the bathroom. States in the hospital they were trieng everything to help including increasing fiber, changing TF formula, adding anti-diarrheal meds. States at times her abdomen has generalized discomfort but this is tolerable. Bloating present with elevated TF rates too. States she was told if she can do 35cc/hr for 16 hours for 7-10 days straight, she can go home.     Denies any acute cardiac like CP, SOB. No coughing. No ST.   Surgical wounds without acute changes.   Jejunostomy site stable without drainage.            Physical Exam:     /55 (BP Location: Left arm)  Pulse 78  Temp 96.5  F (35.8  C) (Oral)  Resp 16  Ht 1.524 m (5')  Wt 41.3 kg (91 lb)  SpO2 100%  BMI 17.77 kg/m2     Vitals are reviewed and stable.   GENERAL: Alert and oriented x 3. NAD.  at bedside.   HEENT: Anicteric sclera. PERRLa intact but right pupil 5mm vs. left pupil 2mm. Mucous membranes moist. No thrush. Swallow intact. Pharyngostomy tube to left neck noted. Mild crusting to outer surface and faint surrounding redness but otherwise stable.   CV: " RRR. S1, S2. No murmurs appreciated.   RESPIRATORY: Effort normal with sitting and talking activity. Lungs CTA a/p with no wheezing, rales, rhonchi. On RA.   GI: Abdomen soft and non distended with normoactive bowel sounds present in all quadrants. No tenderness. Jejunostomy tube normal. No surrounding irritation.   MUSCULOSKELETAL: No joint swelling or tenderness. Moves all extremities with ease. Right shoulder with anterior palpable pain. Abduction, adduction all intact. Anterior rotation painful. No rotator cuff instability. Joint without acute swelling, redness, warmth.   NEUROLOGICAL: No focal deficits. Sensation to distal extremities intact. Facial symmetry intact. Asymmetrical pupils as above. No involuntary movements.   EXTREMITIES: No peripheral edema. Intact bilateral pedal and posttibial pulses.   SKIN: No generalized jaundice. No acute rashes or sores to exposed body areas. Chest wound x 2 noted. Superior wound closed with scabbing present. Inferior incision with bandage over site. Later WOCN did evaluation with picture of site as follows.              Past Medical History:     Past Medical History:   Diagnosis Date     Atrial fibrillation (H)      Breast cancer (H) 2007    right side - lumpectomy, chemo, and local radiation     Chronic infection     infection/wound for two years after esoph surgery     Esophageal perforation      Fibromyalgia      GERD (gastroesophageal reflux disease)      Hiatal hernia      History of blood transfusion      IBS (irritable bowel syndrome)      Meniere's disease     deaf left ear     MS (multiple sclerosis) (H)      Noninfectious ileitis      Other chronic pain              Past Surgical History:      Past Surgical History:   Procedure Laterality Date     APPENDECTOMY       BACK SURGERY  5/1/2015    lumbar fusion     BRONCHOSCOPY FLEXIBLE N/A 4/17/2017    Procedure: BRONCHOSCOPY FLEXIBLE;;  Surgeon: Jens Wise MD;  [facility]     C GASTROSTOMY/JEJUN  TUBE      J tube for feedings     CLOSE SPIT FISTULA N/A 4/17/2017    Procedure: CLOSE SPIT FISTULA;;  Surgeon: Jens Wise MD;  [facility]     COMBINED ESOPHAGOSCOPY, GASTROSCOPY, DUODENOSCOPY (EGD), REMOVE ESOPHAGEAL STENT N/A 8/26/2016    Procedure: COMBINED ESOPHAGOSCOPY, GASTROSCOPY, DUODENOSCOPY (EGD), REMOVE ESOPHAGEAL STENT;  Surgeon: Jens Wise MD;  [facility]     CREATE SPIT FISTULA N/A 1/9/2017    Procedure: CREATE SPIT FISTULA;  Surgeon: Jens Wise MD;  [facility]     ESOPHAGECTOMY N/A 1/9/2017    Procedure: ESOPHAGECTOMY;  Surgeon: Jens Wise MD;  [facility]     ESOPHAGOSCOPY, GASTROSCOPY, DUODENOSCOPY (EGD), COMBINED N/A 4/18/2016    Procedure: COMBINED ESOPHAGOSCOPY, GASTROSCOPY, DUODENOSCOPY (EGD);  Surgeon: Alexis Barraza MD;  [facility]     ESOPHAGOSCOPY, GASTROSCOPY, DUODENOSCOPY (EGD), COMBINED N/A 4/25/2016    Procedure: COMBINED ESOPHAGOSCOPY, GASTROSCOPY, DUODENOSCOPY (EGD);  Surgeon: Alexis Barraza MD;  [facility]     ESOPHAGOSCOPY, GASTROSCOPY, DUODENOSCOPY (EGD), COMBINED N/A 5/4/2016    Procedure: COMBINED ESOPHAGOSCOPY, GASTROSCOPY, DUODENOSCOPY (EGD);  Surgeon: Alexis Barraza MD;  [facility]     ESOPHAGOSCOPY, GASTROSCOPY, DUODENOSCOPY (EGD), COMBINED N/A 5/18/2016    Procedure: COMBINED ESOPHAGOSCOPY, GASTROSCOPY, DUODENOSCOPY (EGD);  Surgeon: Alexis Barraza MD;  [facility]     ESOPHAGOSCOPY, GASTROSCOPY, DUODENOSCOPY (EGD), COMBINED N/A 6/22/2016    Procedure: COMBINED ESOPHAGOSCOPY, GASTROSCOPY, DUODENOSCOPY (EGD);  Surgeon: Alexis Barraza MD;  [facility]     ESOPHAGOSCOPY, GASTROSCOPY, DUODENOSCOPY (EGD), COMBINED N/A 7/12/2016    Procedure: COMBINED ESOPHAGOSCOPY, GASTROSCOPY, DUODENOSCOPY (EGD);  Surgeon: Jens Wise MD;  [facility]     ESOPHAGOSCOPY, GASTROSCOPY, DUODENOSCOPY (EGD), COMBINED N/A  4/21/2017    Procedure: COMBINED ESOPHAGOSCOPY, GASTROSCOPY, DUODENOSCOPY (EGD);  Esophagogastroduodenoscopy, Pharyngostomy Tube Placement ;  Surgeon: Jens Wise MD;  [facility]     ESOPHAGOSCOPY, GASTROSCOPY, DUODENOSCOPY (EGD), COMBINED N/A 5/19/2017    Procedure: COMBINED ESOPHAGOSCOPY, GASTROSCOPY, DUODENOSCOPY (EGD);  Upper Endoscopy, Irrigation and Debridement of Chest Wound ;  Surgeon: Nalini Barreto MD;  [facility]     ESOPHAGOSCOPY, GASTROSCOPY, DUODENOSCOPY (EGD), COMBINED N/A 5/23/2017    Procedure: COMBINED ESOPHAGOSCOPY, GASTROSCOPY, DUODENOSCOPY (EGD);;  Surgeon: Nalini Barreto MD;  [facility]     ESOPHAGOSCOPY, GASTROSCOPY, DUODENOSCOPY (EGD), COMBINED N/A 6/27/2017    Procedure: COMBINED ESOPHAGOSCOPY, GASTROSCOPY, DUODENOSCOPY (EGD);  Esophagogastroduodenoscopy With Removal And Replacement Of Esophageal Stent exchange. Exchange of pharyngtomy tube. Chest wound dressing change;  Surgeon: Nalini Barreto MD;  [facility]     ESOPHAGOSCOPY, GASTROSCOPY, DUODENOSCOPY (EGD), DILATATION, COMBINED N/A 6/29/2016    Procedure: COMBINED ESOPHAGOSCOPY, GASTROSCOPY, DUODENOSCOPY (EGD), DILATATION;  Surgeon: Jens Wise MD;  [facility]     EXPLORE NECK N/A 5/19/2017    Procedure: EXPLORE NECK;;  Surgeon: Nalini Barreto MD;  [facility]     HC UGI ENDOSCOPY W TRANSENDOSCOPIC STENT PLACEMENT N/A 7/12/2016    Procedure: COMBINED ESOPHAGOSCOPY, GASTROSCOPY, DUODENOSCOPY (EGD), PLACE TRANSENDOSCOPIC ESOPHAGEAL STENT;  Surgeon: Jens Wise MD;  [facility]     HC UGI ENDOSCOPY W TRANSENDOSCOPIC STENT PLACEMENT N/A 7/22/2016    Procedure: COMBINED ESOPHAGOSCOPY, GASTROSCOPY, DUODENOSCOPY (EGD), PLACE TRANSENDOSCOPIC ESOPHAGEAL STENT;  Surgeon: Jens Wise MD;  [facility]     INCISION AND DRAINAGE CHEST WASHOUT, COMBINED N/A 5/27/2017    Procedure: COMBINED INCISION AND DRAINAGE CHEST WASHOUT;  Chest washout;  Surgeon: Estevan  Nalini NAVARRO MD;  [facility]     INCISION AND DRAINAGE CHEST WASHOUT, COMBINED N/A 5/28/2017    Procedure: COMBINED INCISION AND DRAINAGE CHEST WASHOUT;  Chest washout;  Surgeon: Nalini Barreto MD;  [facility]     INCISION AND DRAINAGE CHEST WASHOUT, COMBINED N/A 6/9/2017    Procedure: COMBINED INCISION AND DRAINAGE CHEST WASHOUT;  Chest washout and dressing change, Esophaogastroduodenoscopy;  Surgeon: Jens Wise MD;  [facility]     IRRIGATION AND DEBRIDEMENT CHEST WASHOUT, COMBINED N/A 6/29/2016    Procedure: COMBINED IRRIGATION AND DEBRIDEMENT CHEST WASHOUT;  Surgeon: Jens Wise MD;  [facility]     IRRIGATION AND DEBRIDEMENT CHEST WASHOUT, COMBINED N/A 5/23/2017    Procedure: COMBINED IRRIGATION AND DEBRIDEMENT CHEST WASHOUT;  COMBINED IRRIGATION AND DEBRIDEMENT CHEST WASHOUT,COMBINED ESOPHAGOSCOPY, GASTROSCOPY, DUODENOSCOPY (EGD) ;  Surgeon: Nalini Barreto MD;  [facility]     IRRIGATION AND DEBRIDEMENT CHEST WASHOUT, COMBINED N/A 5/24/2017    Procedure: COMBINED IRRIGATION AND DEBRIDEMENT CHEST WASHOUT;  Chest wound Washout and Dressing Change;  Surgeon: Nalini Barreto MD;  [facility]     IRRIGATION AND DEBRIDEMENT CHEST WASHOUT, COMBINED N/A 5/25/2017    Procedure: COMBINED IRRIGATION AND DEBRIDEMENT CHEST WASHOUT;  Irrigation and Debridement Chest Washout and dressing change;  Surgeon: Nalini Barreto MD;  [facility]     IRRIGATION AND DEBRIDEMENT CHEST WASHOUT, COMBINED N/A 5/26/2017    Procedure: COMBINED IRRIGATION AND DEBRIDEMENT CHEST WASHOUT;  Irrigation and Debridement Chest Washout with  dressing change;  Surgeon: Nalini Barreto MD;  [facility]     IRRIGATION AND DEBRIDEMENT CHEST WASHOUT, COMBINED N/A 5/30/2017    Procedure: COMBINED IRRIGATION AND DEBRIDEMENT CHEST WASHOUT;  Irrigation and Debridement Chest Washout , PICC line dressing change;  Surgeon: Nalini Barreto MD;  [facility]     IRRIGATION AND DEBRIDEMENT CHEST  WASHOUT, COMBINED N/A 5/31/2017    Procedure: COMBINED IRRIGATION AND DEBRIDEMENT CHEST WASHOUT;  Irrigation and Debridement Chest Washout ;  Surgeon: Nalini Barreto MD;  [facility]     IRRIGATION AND DEBRIDEMENT CHEST WASHOUT, COMBINED N/A 6/1/2017    Procedure: COMBINED IRRIGATION AND DEBRIDEMENT CHEST WASHOUT;  Chest Wound Irrigation And Debridement with dressing change ;  Surgeon: Nalini Barreto MD;  [facility]     IRRIGATION AND DEBRIDEMENT CHEST WASHOUT, COMBINED N/A 6/4/2017    Procedure: COMBINED IRRIGATION AND DEBRIDEMENT CHEST WASHOUT;  COMBINED IRRIGATION AND DEBRIDEMENT CHEST WASHOUT, Esophagoscopy, Gastroscopy, Duodenoscopy (EGD) place transendoscopic esophageal stent,   Pharyngostomy and chest wall tube exchange  C-Arm;  Surgeon: Jens Wise MD;  [facility]     IRRIGATION AND DEBRIDEMENT CHEST WASHOUT, COMBINED N/A 6/2/2017    Procedure: COMBINED IRRIGATION AND DEBRIDEMENT CHEST WASHOUT;  Chest Wound Irrigation and Debridement, Dressing Change;  Surgeon: Nalini Barreto MD;  [facility]     LAPAROSCOPIC ASSISTED INSERTION TUBE JEJUNOSTOMY N/A 4/17/2017    Procedure: LAPAROSCOPIC ASSISTED INSERTION TUBE JEJUNOSTOMY;;  Surgeon: Jens Wise MD;  [facility]     LAPAROSCOPY DIAGNOSTIC (GENERAL) N/A 1/9/2017    Procedure: LAPAROSCOPY DIAGNOSTIC (GENERAL);  Surgeon: Jens Wise MD;  [facility]     LAPAROSCOPY DIAGNOSTIC (GENERAL) N/A 4/17/2017    Procedure: LAPAROSCOPY DIAGNOSTIC (GENERAL);  Laparoscopic , Neck Dissection, Spit Fistula Takedown, Laparoscopic Jejunostomy Tube and Pharyngostomy Tube, Gastric Pull up, Upper Endoscopy(EGD)  , Flexible Bronchoscopy ,Sternotomy ;  Surgeon: Jens Wise MD;  [facility]     LUMPECTOMY BREAST Right 2007     NERVE BLOCK PERIPHERAL N/A 8/30/2016    Procedure: NERVE BLOCK PERIPHERAL;  Surgeon: GENERIC ANESTHESIA PROVIDER;  [facility]     NISSEN FUNDOPLICATION  1/2016      "PHARYNGOSTOMY N/A 4/18/2016    Procedure: PHARYNGOSTOMY;  Surgeon: Alexis Barraza MD;  [facility]     PHARYNGOSTOMY N/A 4/25/2016    Procedure: PHARYNGOSTOMY;  Surgeon: Alexis Barraza MD;  [facility]     PHARYNGOSTOMY N/A 5/4/2016    Procedure: PHARYNGOSTOMY;  Surgeon: Alexis Barraza MD;  [facility]     PHARYNGOSTOMY N/A 6/22/2016    Procedure: PHARYNGOSTOMY;  Surgeon: Alexis Barraza MD;  [facility]     PHARYNGOSTOMY N/A 6/29/2016    Procedure: PHARYNGOSTOMY;  Surgeon: Jens Wise MD;  [facility]     PHARYNGOSTOMY N/A 4/21/2017    Procedure: PHARYNGOSTOMY;;  Surgeon: Jens Wise MD;  [facility]     PHARYNGOSTOMY Left 5/31/2017    Procedure: PHARYNGOSTOMY;;  Surgeon: Nalini Barreto MD;  [facility]     PICC INSERTION Left 8/25/2016    5fr DL BioFlo PICC, 42cm (3cm external) in the L medial brachial vein w/ tip in the SVC RA junction.     PICC INSERTION - Rewire Right 05/31/2017    5fr DL BioFlo PICC, 40cm (6cm external) in the R medial brachial vein w/ tip in the SVC RA junction.     THORACOTOMY Right 1998    lung infection - \"hard crust formed on lung\"     THORACOTOMY Left 1/9/2017    Procedure: THORACOTOMY;  Surgeon: Jens Wise MD;  [facility]     WRIST SURGERY               Family History:     Family History   Problem Relation Age of Onset     Alzheimer Disease Mother      CEREBROVASCULAR DISEASE Father      cerebral hemorrhage     Ovarian Cancer Sister      Breast Cancer Daughter       Family Status   Relation Status     Mother      Father      Sister      Daughter             Social History:     Social History   Substance Use Topics     Smoking status: Former Smoker     Packs/day: 1.00     Years: 15.00     Types: Cigarettes     Quit date: 1/1/1998     Smokeless tobacco: Not on file     Alcohol use No      Social History     Social History Narrative    Retired realtor.  Lives with "  Richard. 2 children alive and well.     Living situation prior to admission: Lives with .          Medications:     Current Facility-Administered Medications on File Prior to Encounter:  [DISCONTINUED] gabapentin (NEURONTIN) solution 300 mg   [DISCONTINUED] parenteral nutrition - ADULT compounded formula CYCLE   [] parenteral nutrition - ADULT compounded formula   [DISCONTINUED] opium tincture 10 MG/ML (1%) liquid 6 mg   [DISCONTINUED] lipids (INTRALIPID) 20 % infusion 180 mL   [DISCONTINUED] acetaminophen (TYLENOL) tablet 975 mg   [DISCONTINUED] hydrocortisone (CORTAID) 1 % cream   [DISCONTINUED] diphenhydrAMINE (BENADRYL) solution 25 mg   [DISCONTINUED] diphenhydrAMINE-zinc acetate (BENADRYL) cream   [DISCONTINUED] fiber modular (NUTRISOURCE FIBER) packet 2 packet   [DISCONTINUED] cholestyramine (QUESTRAN) Packet 4 g   [DISCONTINUED] simethicone (MYLICON) suspension 40 mg   [DISCONTINUED] lidocaine (XYLOCAINE) 5 % ointment   [DISCONTINUED] fentaNYL (DURAGESIC) Patch in Place   [DISCONTINUED] fentaNYL (DURAGESIC) patch REMOVAL   [DISCONTINUED] fentaNYL (DURAGESIC) 100 mcg/hr 72 hr patch 1 patch   [DISCONTINUED] oxyCODONE (ROXICODONE) solution 10-20 mg   [DISCONTINUED] Benzocaine (HURRICAINE/TOPEX) 20 % spray   [DISCONTINUED] saccharomyces boulardii (FLORASTOR) capsule 250 mg   [DISCONTINUED] enoxaparin (LOVENOX) injection 40 mg   [DISCONTINUED] bacitracin ointment   [DISCONTINUED] chlorhexidine (PERIDEX) 0.12 % solution 15 mL   [DISCONTINUED] chlorhexidine (HIBICLENS) 4 % liquid   [DISCONTINUED] lidocaine (LIDODERM) 5 % Patch 1 patch   [DISCONTINUED] lidocaine (LIDODERM) patch REMOVAL   [DISCONTINUED] lidocaine (LIDODERM) patch in PLACE   [DISCONTINUED] lidocaine 1 % 0.5-5 mL   [DISCONTINUED] lidocaine (LMX4) kit   [DISCONTINUED] sodium chloride (PF) 0.9% PF flush 5-50 mL   [DISCONTINUED] lidocaine 1 % 1 mL   [DISCONTINUED] lidocaine (LMX4) kit   [DISCONTINUED] sodium chloride (PF) 0.9% PF  flush 10-20 mL   [DISCONTINUED] heparin lock flush 10 UNIT/ML injection 5-10 mL   [DISCONTINUED] heparin lock flush 10 UNIT/ML injection 5-10 mL   [DISCONTINUED] benzocaine-menthol (CHLORASEPTIC) 6-10 MG lozenge 1 lozenge   [DISCONTINUED] metoprolol (LOPRESSOR) suspension 25 mg   [DISCONTINUED] sodium chloride (PF) 0.9% PF flush 10-20 mL   [DISCONTINUED] sodium chloride (PF) 0.9% PF flush 10 mL   [DISCONTINUED] magnesium sulfate 2 g in NS intermittent infusion ([employer]ar[employer] or [facility] Cmpd)   [DISCONTINUED] magnesium sulfate 4 g in 100 mL sterile water (premade)   [DISCONTINUED] potassium phosphate 15 mmol in D5W 250 mL intermittent infusion   [DISCONTINUED] potassium phosphate 20 mmol in D5W 500 mL intermittent infusion   [DISCONTINUED] potassium phosphate 20 mmol in D5W 250 mL intermittent infusion   [DISCONTINUED] potassium phosphate 25 mmol in D5W 500 mL intermittent infusion   [DISCONTINUED] sodium chloride (PF) 0.9% PF flush 3 mL   [DISCONTINUED] sodium chloride (PF) 0.9% PF flush 3 mL   [DISCONTINUED] traZODone (DESYREL) tablet 100 mg   [DISCONTINUED] diphenoxylate-atropine (LOMOTIL) liquid 10 mL   [DISCONTINUED] loperamide (IMODIUM) liquid 4 mg   [DISCONTINUED] pantoprazole (PROTONIX) suspension 40 mg   [DISCONTINUED] prochlorperazine (COMPAZINE) tablet 5 mg   [DISCONTINUED] prochlorperazine (COMPAZINE) injection 5 mg   [DISCONTINUED] prochlorperazine (COMPAZINE) Suppository 12.5 mg   [DISCONTINUED] dextrose 10 % 1,000 mL infusion   [DISCONTINUED] glucose 40 % gel 15-30 g   [DISCONTINUED] dextrose 50 % injection 25-50 mL   [DISCONTINUED] glucagon injection 1 mg   [DISCONTINUED] artificial saliva (BIOTENE DRY MOUTHWASH) liquid 20 mL   [DISCONTINUED] ipratropium - albuterol 0.5 mg/2.5 mg/3 mL (DUONEB) neb solution 3 mL   [DISCONTINUED] labetalol (NORMODYNE/TRANDATE) injection 10-40 mg   [DISCONTINUED] albuterol (PROAIR HFA/PROVENTIL HFA/VENTOLIN HFA) Inhaler 4 puff   [DISCONTINUED] albuterol neb solution 2.5 mg    [DISCONTINUED] sodium chloride (PF) 0.9% PF flush 3 mL   [DISCONTINUED] sodium chloride (PF) 0.9% PF flush 3 mL   [DISCONTINUED] naloxone (NARCAN) injection 0.1-0.4 mg   [DISCONTINUED] ondansetron (ZOFRAN-ODT) ODT tab 4 mg   [DISCONTINUED] ondansetron (ZOFRAN) injection 4 mg   [DISCONTINUED] ferrous sulfate 300 (60 FE) MG/5ML syrup 300 mg   [DISCONTINUED] bupivacaine 0.25 % - EPINEPHrine 1:200,000 injection     Current Outpatient Prescriptions on File Prior to Encounter:  chlorhexidine (HIBICLENS) 4 % liquid Apply topically 2 times daily   simethicone (MYLICON) 40 MG/0.6ML suspension 0.6 mLs (40 mg) by Per Feeding Tube route 4 times daily   enoxaparin (LOVENOX) 40 MG/0.4ML injection Inject 0.4 mLs (40 mg) Subcutaneous every 24 hours   oxyCODONE (ROXICODONE) 5 MG/5ML solution 10-20 mLs (10-20 mg) by Per J Tube route every 3 hours as needed for moderate to severe pain   oxyCODONE (ROXICODONE) 10 MG IR tablet Take 1 to 1.5 tablet every 3-4 hours PRN pain   diphenoxylate-atropine (LOMOTIL) 0.5-0.005 mg/mL liquid Take 5 mLs by mouth 4 times daily as needed for diarrhea   fiber modular (NUTRISOURCE FIBER) packet 1 packet by Per Feeding Tube route 3 times daily (with meals)   metoprolol (LOPRESSOR) 10 mg/mL SUSP 2.5 mLs (25 mg) by Per J Tube route 2 times daily   opium tincture 10 mg/mL (1%) liquid Take 0.6 mLs (6 mg) by mouth every 6 hours   ferrous sulfate 300 (60 FE) MG/5ML syrup 5 mLs (300 mg) by Per J Tube route daily   gabapentin (NEURONTIN) 250 MG/5ML solution Take 6 mLs (300 mg) by mouth every 8 hours   traZODone (DESYREL) 100 MG tablet 0.5 tablets (50 mg) by Per G Tube route At Bedtime (Patient taking differently: 100 mg by Per G Tube route At Bedtime )   ranitidine (ZANTAC) 150 MG/10ML syrup Take 10 mLs (150 mg) by mouth 2 times daily   order for DME Equipment being ordered: [facility] [place] Suction Machine-IntermittentSuction Canisters(2)Suction Canister Holders(2)Suction Connect Tube(2)5 in 1 Connector(2)Bacteria  Filter(2)Yaunkauer Suction(2)Treatment Diagnosis: Esophogeal Perforation, s/p esophagectomy   order for DME Equipment being ordered: Suction PumpSuction Canister(2)Suction Tubing(2)Bacteria Filter(2)Yaunkauer(4)Red Rubber Catheter(2)Treatment Diagnosis: Esophogeal Perforation, s/p esophagectomy   DiphenhydrAMINE HCl (BENADRYL PO) Take 25 mg by mouth nightly as needed   cholestyramine (QUESTRAN) 4 G Packet Take 1 packet by mouth daily   multivitamins with minerals (CERTAVITE/CEROVITE) LIQD liquid Take 15 mLs by mouth daily   loperamide (IMODIUM) 1 MG/5ML liquid Take 20 mLs (4 mg) by mouth 4 times daily as needed for diarrhea (Patient taking differently: Take 4 mg by mouth 2 times daily )   Lactobacillus Rhamnosus, GG, (CULTURELLE PO) Take 1 tablet by mouth 2 times daily             Allergies:     Allergies   Allergen Reactions     Amoxicillin Diarrhea     Ativan [Lorazepam] Other (See Comments)     Hallucinations     Hydromorphone Itching     4/12/17 - patient open to using this as she tolerated Hydromorphone PCA during hospitalization in January 2017.      Morphine Itching             Labs:     Lab Results   Component Value Date    WBC 8.2 06/29/2017    WBC 6.9 06/26/2017    WBC 8.5 06/22/2017    HGB 9.3 (L) 06/29/2017    HGB 8.7 (L) 06/26/2017    HGB 8.6 (L) 06/22/2017    HCT 30.3 (L) 06/29/2017    HCT 29.1 (L) 06/26/2017    HCT 28.1 (L) 06/22/2017     06/29/2017     06/27/2017     06/26/2017     06/29/2017     06/26/2017     06/22/2017    POTASSIUM 4.1 06/29/2017    POTASSIUM 4.0 06/26/2017    POTASSIUM 3.9 06/22/2017    CHLORIDE 103 06/29/2017    CHLORIDE 103 06/26/2017    CHLORIDE 103 06/22/2017    CO2 27 06/29/2017    CO2 30 06/26/2017    CO2 27 06/22/2017    BUN 23 06/29/2017    BUN 22 06/26/2017    BUN 19 06/22/2017    CR 0.37 (L) 06/29/2017    CR 0.37 (L) 06/26/2017    CR 0.34 (L) 06/22/2017    GLC 87 06/29/2017    GLC 89 06/26/2017    GLC 94 06/22/2017    TROPI 0.022  01/17/2017    TROPI 0.195 (HH) 01/13/2017    TROPI 0.283 (HH) 01/13/2017    AST 18 06/26/2017    AST 24 06/19/2017    AST 25 06/15/2017    ALT 26 06/26/2017    ALT 25 06/19/2017    ALT 37 06/15/2017    ALKPHOS 322 (H) 06/26/2017    ALKPHOS 361 (H) 06/19/2017    ALKPHOS 423 (H) 06/15/2017    BILITOTAL 0.7 06/26/2017    BILITOTAL 0.5 06/19/2017    BILITOTAL 0.8 06/15/2017    INR 1.12 06/26/2017    INR 1.08 06/19/2017    INR 1.06 06/12/2017      France Shen, ARIELLE, CNP  [facility]

## 2017-06-29 NOTE — IP AVS SNAPSHOT
MRN:[id number]                      After Visit Summary   6/29/2017    Minerva Blanco    MRN: [id number]           Thank you!     Thank you for choosing [facility] for your care. Our goal is always to provide you with excellent care. Hearing back from our patients is one way we can continue to improve our services. Please take a few minutes to complete the written survey that you may receive in the mail after you visit with us. Thank you!        Patient Information     Date Of Birth          1945        Designated Caregiver       Most Recent Value    Caregiver    Will someone help with your care after discharge? yes      About your hospital stay     You were admitted on:  June 29, 2017 You last received care in the:   Transitional Care    You were discharged on:  July 7, 2017        Reason for your hospital stay       Minerva is a 72 year old female with PMH of atrial fibrillation, breast cancer s/p lumpectomy 2007, fibromyalgia, GERD, IBS, meniere's disease, MS and symptomatic hiatal hernia. S/p Toupet fundoplication 1/2016 c/b esophageal perforation with mediastinal phlegmon s/p initial proximal gastrectomy, distal esophagectomy, spit fistula creation, left thoracotomy with mediastinal phlegmon excision on 1/9/2017. Since that time, she has undergone mulptiple procedures for management including esophageal dilations, stent placement/removal, washouts, lysis of adhesions, jejunostomy feeding tube placement, retrosternal gastric pull-through, resection of left half of the manubrium, spit fistula takedown, right tube thoracostomy and pharyngostomy tube placement. She most recently was admitted 5/18 and diagnosed with anastomic leak with mediastical abscess. S/p mediastinal wound debridement with chest tube placement (5/19) with subsequent pharyngostomy tube 5/31, stent placement 6/4 with exchange 6/27 and multiple washouts (6/2, 6/4, 6/9, 6/14, 6/27). Hospital course complicated by diarrhea  "(r/t TFs), surgical pain (seen by palliative). Transferred to TCU 6/29/17 for further rehab needs and attempts to advance tolerance to TFs. TFs were advanced to goal on 7/5 and TPN was stopped. Patient was discharged home 7/7/17 with plan to return to hospital on 7/17 for washout.                  Who to Call     For medical emergencies, please call 911.  For non-urgent questions about your medical care, please call your primary care provider or clinic, [phone]          Attending Provider     Provider Specialty    Miroslava Santa MD Internal Medicine       Primary Care Provider Office Phone # Fax #    Andrea Nino [phone]       When to contact your care team       Please contact your primary care provider or seek medical care if you develop chest pain, shortness of breath, fever >101F, chills, abdominal pain,or if any other concerns arise.                  After Care Instructions     Activity       Your activity upon discharge: activity as tolerated. No driving while on narcotics.            Diet       Follow this diet upon discharge: Strict NPO.     Adult Formula Drip Feeding: Specified Time TwoCal HN; Jejunostomy; Goal Rate: 50; mL/hr; From: 6:00 AM; 10:00 PM; Medication - Tube Feeding Flush Frequency: At least 15-30 mL water before and after medication administration and with tube clogging    Free water: 75cc every 2 hours            Discharge Instructions       - Your fentanyl patch will be decreased to 75mcg/hr starting on 7/8, you will need to follow with your PCP or pain clinic or surgery for further tapering of your pain medications on discharge.  - Wound care:  R chest wound:  daily:  cleanse with Microlens pack with dry unfolded 2x2\" gauze apply No sting to surrounding skin, secure with 4x4's            Tubes and drains       You are going home with the following tubes or drains: pharyngostomy tube, continue to low intermittent suction, irrigate with 30cc every 8 hours.   "          Wound care and dressings       Instructions to care for your wound at home: continue hibiclens to pharyngostomy twice daily                  Follow-up Appointments     Adult [facility] Follow-up and recommended labs and tests       Continue to follow up with thoracic surgery as planned, you have a wash out scheduled for 7/17/17    Follow up with primary care provider or pain/palliative provider to discuss tapering your narcotics    Appointments on [place] and/or [facility] (with [facility] or [facility] provider or service). Call [phone] if you haven't heard regarding these appointments within 7 days of discharge.                  Your next 10 appointments already scheduled     Jul 17, 2017   Procedure with Jens Wise MD   [facility] [place], Same Day Surgery (--)    [address]  [place] [address] [phone]              Additional Services     Home care nursing referral       RN skilled nursing visit. RN to assess vital signs and weight, respiratory and cardiac status, pain level and activity tolerance, incision for signs/symptoms of infection, hydration, nutrition and bowel status, home safety and pharyngostomy status and function, J tube status and function.  RN to teach medication management and reinforcement of wound care teaching to chest and neck drain sites.  RN to provide lab draws, if ordered.    Home care services to begin within 24-48 hours of DC from TCU    Your provider has ordered home care nursing services. If you have not been contacted within 2 days of your discharge please call the inpatient department phone number at [phone] .                  Further instructions from your care team       [facility] to supply your home low intermittent suction machine and supplies [phone]    Pharyngostomy to low intermittent suction    [facility] supplies your tube feeding formula and supplies [phone]    Pending Results     No orders found from 6/27/2017 to  "2017.            Statement of Approval     Ordered          17 1125  I have reviewed and agree with all the recommendations and orders detailed in this document.  EFFECTIVE NOW     Approved and electronically signed by:  Wendy Storey PA-C             Admission Information     Date & Time Provider Department Dept. Phone    2017 Miroslava Santa MD TR Transitional Care [phone]      Your Vitals Were     Blood Pressure Pulse Temperature Respirations Height Weight    114/51 (BP Location: Left arm) 74 96.1  F (35.6  C) (Oral) 16 1.524 m (5') 40.3 kg (88 lb 12.8 oz)    Pulse Oximetry BMI (Body Mass Index)                98% 17.34 kg/m2          MyChart Information     [facility] lets you send messages to your doctor, view your test results, renew your prescriptions, schedule appointments and more. To sign up, go to [email or web address] . Click on \"Log in\" on the left side of the screen, which will take you to the Welcome page. Then click on \"Sign up Now\" on the right side of the page.     You will be asked to enter the access code listed below, as well as some personal information. Please follow the directions to create your username and password.     Your access code is: [email or web address]  Expires: 2017 11:41 AM     Your access code will  in 90 days. If you need help or a new code, please call your [place] clinic or [phone].        Care EveryWhere ID     This is your Care EveryWhere ID. This could be used by other organizations to access your [facility] medical records  [id number]        Equal Access to Services     DIANNA JORGENSEN : Hadkaye Meng, isis romero, florentin palomo. So [facility] [phone].    ATENCIÓN: Si habla español, tiene a jackson disposición servicios gratuitos de asistencia lingüística. Llame al [phone].    We comply with applicable federal civil rights laws and [place] laws. We do not " discriminate on the basis of race, color, national origin, age, disability sex, sexual orientation or gender identity.               Review of your medicines      START taking        Dose / Directions    acetaminophen 32 mg/mL solution   Commonly known as:  TYLENOL   Indication:  Pain   Used for:  Esophageal perforation        Dose:  975 mg   30.45 mLs (975 mg) by Per Feeding Tube route 3 times daily   Quantity:  300 mL   Refills:  0       chlorhexidine 0.12 % solution   Commonly known as:  PERIDEX   Indication:  Tooth Plaque   Used for:  Esophageal perforation        Dose:  15 mL   Swish and spit 15 mLs in mouth 2 times daily   Refills:  0       fentaNYL 75 mcg/hr 72 hr patch   Commonly known as:  DURAGESIC   Used for:  Esophageal perforation, Acute post-operative pain        Dose:  1 patch   Start taking on:  7/8/2017   Place 1 patch onto the skin every 72 hours   Quantity:  5 patch   Refills:  0       pantoprazole 40 MG EC tablet   Commonly known as:  PROTONIX   Used for:  Gastroesophageal reflux disease, esophagitis presence not specified        Take by mouth 30-60 minutes before a meal.   Quantity:  30 tablet   Refills:  0         CONTINUE these medicines which may have CHANGED, or have new prescriptions. If we are uncertain of the size of tablets/capsules you have at home, strength may be listed as something that might have changed.        Dose / Directions    loperamide 1 MG/5ML liquid   Commonly known as:  IMODIUM   This may have changed:  when to take this   Used for:  Bowel habit changes        Dose:  4 mg   Take 20 mLs (4 mg) by mouth 4 times daily as needed for diarrhea   Quantity:  200 mL   Refills:  0       metoprolol 10 mg/mL Susp   Commonly known as:  LOPRESSOR   This may have changed:  how much to take   Used for:  Atrial fibrillation, unspecified type (H)        Dose:  12.5 mg   1.25 mLs (12.5 mg) by Per J Tube route 2 times daily   Refills:  0       opium tincture 10 MG/ML (1%) liquid   This may  have changed:    - when to take this  - reasons to take this   Used for:  Bowel habit changes        Dose:  0.6 mL   Take 0.6 mLs (6 mg) by mouth every 6 hours as needed for diarrhea or moderate pain   Quantity:  118 mL   Refills:  0       oxyCODONE 10 MG IR tablet   Commonly known as:  ROXICODONE   This may have changed:    - how much to take  - how to take this  - when to take this  - reasons to take this  - additional instructions  - Another medication with the same name was removed. Continue taking this medication, and follow the directions you see here.   Used for:  Acute post-operative pain        Dose:  10 mg   Take 1 tablet (10 mg) by mouth every 4 hours as needed for moderate to severe pain   Quantity:  60 tablet   Refills:  0       traZODone 100 MG tablet   Commonly known as:  DESYREL   This may have changed:  how much to take   Used for:  Insomnia, unspecified type        Dose:  50 mg   0.5 tablets (50 mg) by Per G Tube route At Bedtime   Quantity:  30 tablet   Refills:  0         CONTINUE these medicines which have NOT CHANGED        Dose / Directions    BENADRYL PO        Dose:  25 mg   Take 25 mg by mouth nightly as needed   Refills:  0       chlorhexidine 4 % liquid   Commonly known as:  HIBICLENS   Used for:  History of esophagectomy        Apply topically 2 times daily   Quantity:  200 mL   Refills:  0       cholestyramine 4 G Packet   Commonly known as:  QUESTRAN        Dose:  1 packet   Take 1 packet by mouth daily   Refills:  0       CULTURELLE PO        Dose:  1 tablet   Take 1 tablet by mouth 2 times daily   Refills:  0       diphenoxylate-atropine 2.5-0.025 MG/5ML liquid   Commonly known as:  LOMOTIL   Used for:  Bowel habit changes        Dose:  5 mL   Take 5 mLs by mouth 4 times daily as needed for diarrhea   Quantity:  300 mL   Refills:  0       ferrous sulfate 300 (60 FE) MG/5ML syrup   Used for:  Anemia due to other cause        Dose:  300 mg   5 mLs (300 mg) by Per J Tube route daily    Quantity:  150 mL   Refills:  0       fiber modular packet   Used for:  History of esophagectomy        Dose:  1 packet   1 packet by Per Feeding Tube route 3 times daily (with meals)   Quantity:  60 packet   Refills:  0       gabapentin 250 MG/5ML solution   Commonly known as:  NEURONTIN   Used for:  Acute post-operative pain        Dose:  300 mg   Take 6 mLs (300 mg) by mouth every 8 hours   Quantity:  550 mL   Refills:  0       multivitamins with minerals Liqd liquid        Dose:  15 mL   Take 15 mLs by mouth daily   Refills:  0       * order for DME   Used for:  Hx of esophagectomy        Equipment being ordered:  [facility] [place] [place] Suction Machine-Intermittent Suction Canisters(2) Suction Canister Holders(2) Suction Connect Tube(2) 5 in 1 Connector(2) Bacteria Filter(2) Yaunkauer Suction(2)  Treatment Diagnosis: Esophogeal Perforation, s/p esophagectomy   Quantity:  1 Device   Refills:  0       * order for DME   Used for:  Esophageal perforation        Equipment being ordered:  Suction Pump Suction Canister(2) Suction Tubing(2) Bacteria Filter(2) Yaunkauer(4) Red Rubber Catheter(2)  Treatment Diagnosis: Esophogeal Perforation, s/p esophagectomy   Quantity:  1 Device   Refills:  0       simethicone 40 MG/0.6ML suspension   Commonly known as:  MYLICON   Used for:  History of esophagectomy        Dose:  40 mg   0.6 mLs (40 mg) by Per Feeding Tube route 4 times daily   Refills:  0       * Notice:  This list has 2 medication(s) that are the same as other medications prescribed for you. Read the directions carefully, and ask your doctor or other care provider to review them with you.      STOP taking     enoxaparin 40 MG/0.4ML injection   Commonly known as:  LOVENOX           ranitidine 150 MG/10ML syrup   Commonly known as:  Zantac                Where to get your medicines      These medications were sent to [place], [place] - [address]  [address] [address]     Phone:   [phone]     gabapentin 250 MG/5ML solution    pantoprazole 40 MG EC tablet         Some of these will need a paper prescription and others can be bought over the counter. Ask your nurse if you have questions.     Bring a paper prescription for each of these medications     fentaNYL 75 mcg/hr 72 hr patch    opium tincture 10 MG/ML (1%) liquid    oxyCODONE 10 MG IR tablet                Protect others around you: Learn how to safely use, store and throw away your medicines at www.disposemymeds.org.             Medication List: This is a list of all your medications and when to take them. Check marks below indicate your daily home schedule. Keep this list as a reference.      Medications           Morning Afternoon Evening Bedtime As Needed    acetaminophen 32 mg/mL solution   Commonly known as:  TYLENOL   30.45 mLs (975 mg) by Per Feeding Tube route 3 times daily   Last time this was given:  975 mg on 7/7/2017  7:56 AM                                BENADRYL PO   Take 25 mg by mouth nightly as needed   Last time this was given:  25 mg on 7/6/2017  9:07 PM                                chlorhexidine 0.12 % solution   Commonly known as:  PERIDEX   Swish and spit 15 mLs in mouth 2 times daily   Last time this was given:  15 mLs on 7/7/2017  7:57 AM                                chlorhexidine 4 % liquid   Commonly known as:  HIBICLENS   Apply topically 2 times daily   Last time this was given:  7/7/2017  5:51 AM                                cholestyramine 4 G Packet   Commonly known as:  QUESTRAN   Take 1 packet by mouth daily   Last time this was given:  4 g on 7/6/2017  9:08 PM                                CULTURELLE PO   Take 1 tablet by mouth 2 times daily                                diphenoxylate-atropine 2.5-0.025 MG/5ML liquid   Commonly known as:  LOMOTIL   Take 5 mLs by mouth 4 times daily as needed for diarrhea   Last time this was given:  10 mLs on 7/6/2017  9:08 PM                                 fentaNYL 75 mcg/hr 72 hr patch   Commonly known as:  DURAGESIC   Place 1 patch onto the skin every 72 hours   Start taking on:  7/8/2017                                ferrous sulfate 300 (60 FE) MG/5ML syrup   5 mLs (300 mg) by Per J Tube route daily   Last time this was given:  300 mg on 7/7/2017  7:56 AM                                fiber modular packet   1 packet by Per Feeding Tube route 3 times daily (with meals)   Last time this was given:  2 packets on 7/7/2017  7:58 AM                                gabapentin 250 MG/5ML solution   Commonly known as:  NEURONTIN   Take 6 mLs (300 mg) by mouth every 8 hours   Last time this was given:  300 mg on 7/7/2017  5:06 AM                                loperamide 1 MG/5ML liquid   Commonly known as:  IMODIUM   Take 20 mLs (4 mg) by mouth 4 times daily as needed for diarrhea   Last time this was given:  4 mg on 7/6/2017  9:08 PM                                metoprolol 10 mg/mL Susp   Commonly known as:  LOPRESSOR   1.25 mLs (12.5 mg) by Per J Tube route 2 times daily   Last time this was given:  12.5 mg on 7/6/2017  9:11 PM                                multivitamins with minerals Liqd liquid   Take 15 mLs by mouth daily                                opium tincture 10 MG/ML (1%) liquid   Take 0.6 mLs (6 mg) by mouth every 6 hours as needed for diarrhea or moderate pain   Last time this was given:  6 mg on 7/6/2017  1:54 PM                                * order for DME   Equipment being ordered:  [facility] Suction Machine-Intermittent Suction Canisters(2) Suction Canister Holders(2) Suction Connect Tube(2) 5 in 1 Connector(2) Bacteria Filter(2) Yaunkauer Suction(2)  Treatment Diagnosis: Esophogeal Perforation, s/p esophagectomy                                * order for DME   Equipment being ordered:  Suction Pump Suction Canister(2) Suction Tubing(2) Bacteria Filter(2) Yaunkauer(4) Red Rubber Catheter(2)  Treatment Diagnosis: Esophogeal Perforation, s/p  esophagectomy                                oxyCODONE 10 MG IR tablet   Commonly known as:  ROXICODONE   Take 1 tablet (10 mg) by mouth every 4 hours as needed for moderate to severe pain                                pantoprazole 40 MG EC tablet   Commonly known as:  PROTONIX   Take by mouth 30-60 minutes before a meal.                                simethicone 40 MG/0.6ML suspension   Commonly known as:  MYLICON   0.6 mLs (40 mg) by Per Feeding Tube route 4 times daily   Last time this was given:  40 mg on 7/6/2017  9:07 PM                                traZODone 100 MG tablet   Commonly known as:  DESYREL   0.5 tablets (50 mg) by Per G Tube route At Bedtime   Last time this was given:  100 mg on 7/6/2017  9:07 PM                                * Notice:  This list has 2 medication(s) that are the same as other medications prescribed for you. Read the directions carefully, and ask your doctor or other care provider to review them with you.

## 2017-06-29 NOTE — PLAN OF CARE
Problem: Goal Outcome Summary  Goal: Goal Outcome Summary  Patient's pain is managed by Oxycodone 10 mg prn po x2, and Fentanyl patch. Chest wound cdi. Pharyngostomy tube in place left neck, connected to LIS, red skin surrounding site. Diminished LS, no SOB noted. +BS, loose BM mixed with urine, adequate amount. Independent in transferring and perineal cares. NPO maintained. TPN at 85 ml/hr via purple port. TF to resume at 0700. To transfer to TCU once room is available.

## 2017-06-29 NOTE — PLAN OF CARE
Problem: Individualization  Goal: Patient Preferences  Patient is a 72 year old female admitted to room 413 via wheelchair.  Patient is alert and oriented X 3. See Epic for VS and assessment.  Patient is able to transfer SBA using no aids. Patient was settled into their room, shown call light, tv, mealtimes etc. Oriented to unit. Will continue monitoring pain level and VS. Notifying MD with any concerns.  Follow MD orders for cares and medications.     Level of Schooling:college  Ethnicity:  Marital Status:  Dentures: No  Hearing Aid: No  Smoker:  No  Glasses: Yes  Occupation: Retired  Falls 0-1 mo:   no        2-6 mo: no         Advanced Care Directive Referral to Social Work?No

## 2017-06-29 NOTE — IP AVS SNAPSHOT
[address] [address]    Phone:  [phone]                                       After Visit Summary   6/29/2017    Minerva Blanco    MRN: [id number]           After Visit Summary Signature Page     I have received my discharge instructions, and my questions have been answered. I have discussed any challenges I see with this plan with the nurse or doctor.    ..........................................................................................................................................  Patient/Patient Representative Signature      ..........................................................................................................................................  Patient Representative Print Name and Relationship to Patient    ..................................................               ................................................  Date                                            Time    ..........................................................................................................................................  Reviewed by Signature/Title    ...................................................              ..............................................  Date                                                            Time

## 2017-06-29 NOTE — PROGRESS NOTES
Patient was discharged to [facility] so they will handle post discharge follow up cares and coordination          Discharge Disposition:   TCU:   TCU

## 2017-06-29 NOTE — IP AVS SNAPSHOT
` `     [facility]: [phone]            Medication Administration Report for Minerva Blanco as of 07/01/17 0914   Legend:    Given Hold Not Given Due Canceled Entry Other Actions    Time Time (Time) Time  Time-Action       Inactive    Active    Linked        Medications 06/25/17 06/26/17 06/27/17 06/28/17 06/29/17 06/30/17 07/01/17    acetaminophen (TYLENOL) solution 975 mg  Dose: 975 mg Freq: 3 TIMES DAILY Route: PER FEEDING   Start: 06/29/17 2000   Admin Instructions: Maximum acetaminophen dose from all sources= 75 mg/kg/day not to exceed 4 grams/day.         2134 (975 mg)-Given        0931 (975 mg)-Given       1333 (975 mg)-Given       2012 (975 mg)-Given [C]        0831 (975 mg)-Given       [ ] 1400       [ ] 2000           bisacodyl (DULCOLAX) Suppository 10 mg  Dose: 10 mg Freq: 2 TIMES DAILY PRN Route: RE  PRN Reason: constipation  Start: 06/29/17 1421   Admin Instructions: Start with one suppository, if condition remains, may increase to 1 suppository twice daily.  Hold for loose stools.  Notify provider if no stool produced.               chlorhexidine (HIBICLENS) 4 % liquid  Freq: 2 TIMES DAILY Route: Top  Start: 06/29/17 2100   Admin Instructions: Apply to pharyngostomy tube site twice daily         2136 ( )-Given        0932 ( )-Given       2112 ( )-Given        0833 ( )-Given       [ ] 2100           chlorhexidine (PERIDEX) 0.12 % solution 15 mL  Dose: 15 mL Freq: 3 TIMES DAILY Route: SWISH & SPIT  Start: 06/29/17 1430        1751 (15 mL)-Given       2136 (15 mL)-Given        0931 (15 mL)-Given       1338 (15 mL)-Given       2013 (15 mL)-Given        0831 (15 mL)-Given       [ ] 1400       [ ] 2000           cholestyramine (QUESTRAN) Packet 4 g  Dose: 1 packet Freq: 2 TIMES DAILY Route: PO  Indications of Use: DIARRHEA  Start: 06/30/17 2100   Admin Instructions: Powder must be mixed with fluid before administration.  Schedule cholestyramine to be given one hour after other oral meds or  four hours before (or as close to this as possible).            2016 (4 g)-Given        0834 (4 g)-Given       [ ] 2100           dextrose 10 % 1,000 mL infusion  Freq: CONTINUOUS PRN Route: IV  PRN Comment:    Start: 06/30/17 1039   Admin Instructions: For Hypoglycemia Prevention for patients on long-acting subcutaneous basal insulin (Glargine, Detemir, NPH) or continuous insulin infusion. Whenever nutrition support is held or interrupted:  1) Infuse IV D10W at nutrition support rate   2) Notify provider for further instructions               diphenhydrAMINE (BENADRYL) capsule 25 mg  Dose: 25 mg Freq: EVERY 6 HOURS PRN Route: PO  PRN Reason: itching  Start: 06/29/17 1421        2140 (25 mg)-Given             diphenoxylate-atropine (LOMOTIL) liquid 10 mL  Dose: 10 mL Freq: 4 TIMES DAILY Route: PER FEEDING   Start: 06/29/17 1700   Admin Instructions: Hold for constipation         1734 (10 mL)-Given       2135 (10 mL)-Given        0940 (10 mL)-Given       1347 (10 mL)-Given       1703 (10 mL)-Given       2113 (10 mL)-Given        0842 (10 mL)-Given       [ ] 1300       [ ] 1700       [ ] 2100           fentaNYL (DURAGESIC) 100 mcg/hr 72 hr patch 1 patch  Dose: 100 mcg Freq: EVERY 72 HOURS Route: TD  Start: 06/29/17 1545   Admin Instructions: Used fentaNYL patches must be disposed of by sticking sides together and flushing down a toilet.         1728 (1 patch)-Given             fentaNYL (DURAGESIC) Patch in Place  Freq: EVERY 8 HOURS Route: TD  Start: 06/29/17 1430   Admin Instructions: Chart every shift, confirming that patch is still in place on patient (no barcode scan needed). See patch order for dose information.         1732 ( )-Patch in Place       2321 ( )-Patch in Place        0613 ( )-Patch in Place       1340 ( )-Patch in Place       2113 ( )-Patch in Place        0627 ( )-Patch in Place       [ ] 1400       [ ] 2200           fentaNYL (DURAGESIC) patch REMOVAL  Freq: EVERY 72 HOURS Route: TD  Start:  06/29/17 1545   Admin Instructions: Nurse may need to adjust patch removal time schedule to match application time.  Used fentaNYL patches must be disposed of by sticking sides together and flushing down a toilet.         1732 ( )-Patch Removed             ferrous sulfate 300 (60 FE) MG/5ML syrup 300 mg  Dose: 300 mg Freq: DAILY Route: PER J TUBE  Indications of Use: IRON DEFICIENCY  Start: 07/01/17 0800   Admin Instructions: Absorbed best on an empty stomach. If stomach upset occurs, can take with meals.           0832 (300 mg)-Given           fiber modular (NUTRISOURCE FIBER) packet 2 packet  Dose: 2 packet Freq: 3 TIMES DAILY WITH MEALS Route: PER FEEDING   Start: 06/29/17 1800   Admin Instructions: Mix each packet with 60 mL water before administering. SUPPLIED BY NUTRITION DEPARTMENT.         (1733)-Not Given        0934 (2 packet)-Given       1130 (2 packet)-Given       1802 (2 packet)-Given        0835 (2 packet)-Given       [ ] 1200       [ ] 1800           gabapentin (NEURONTIN) solution 300 mg  Dose: 300 mg Freq: EVERY 8 HOURS Route: PER FEEDING   Indications of Use: FIBROMYALGIA SYNDROME  Start: 06/30/17 1430         (1439)-Not Given       2112 (300 mg)-Given        0628 (300 mg)-Given       [ ] 1400       [ ] 2200           heparin lock flush 10 UNIT/ML injection 5-10 mL  Dose: 5-10 mL Freq: EVERY 1 HOUR PRN Route: IK  PRN Reason: other  PRN Comment: to lock each CVC - Open Ended (Tunneled and Non-Tunneled) dormant lumen.  Start: 06/29/17 1421   Admin Instructions: MAX: 5 mL per lumen.               heparin lock flush 10 UNIT/ML injection 5-10 mL  Dose: 5-10 mL Freq: EVERY 24 HOURS Route: IK  Start: 06/29/17 1430   Admin Instructions: To lock each CVC - Open Ended (Tunneled and Non-Tunneled) dormant lumen. Check PRN heparin flush order to see when last dose of PRN heparin was given before administering.  MAX: 5 mL per lumen..         1737 (5 mL)-Given        0552 (5 mL)-Given       1342 (5 mL)-Given         [ ] 1430           hydrocortisone (CORTAID) 1 % cream  Freq: 2 TIMES DAILY Route: Top  Start: 06/29/17 2100   Admin Instructions: Apply to left neck erythema twice daily.         2136 ( )-Given        0935 ( )-Given       2113 ( )-Given        0836 ( )-Given       [ ] 2100           lidocaine (LIDODERM) 5 % Patch 1-2 patch  Dose: 1-2 patch Freq: EVERY 24 HOURS 0800 Route: TD  Start: 06/29/17 1430   Admin Instructions: Apply patch(s) to left chest area. To prevent lidocaine toxicity, patient should be patch free for 12 hrs daily. Patches may be cut to smaller size prior to removing release liner.  NEVER APPLY HEAT OVER PATCH which will increase absorption and may lead to risk of local anesthetic toxicity. Do not apply over area where liposomal bupivacaine was injected for 96 hours post injection.         1725 (2 patch)-Given        1702 (2 patch)-Given [C]        [ ] 1730           lidocaine (LIDODERM) patch in PLACE  Freq: EVERY 8 HOURS Route: TD  Start: 06/29/17 1430   Admin Instructions: Chart every shift, confirming that patch is still in place on patient (no barcode scan needed). See patch order for dose information.  NEVER APPLY HEAT OVER PATCH which will increase absorption and may lead to risk of local anesthetic toxicity. Do not apply over area where liposomal bupivacaine injected for 96 hours.         1736 ( )-Patch in Place       2321 ( )-Patch in Place        0603 ( )-Patch in Place       1340 ( )-Negative [C]       2224 ( )-Patch in Place        0628 ( )-Patch Removed       [ ] 1430       [ ] 2230           lidocaine (LIDODERM) patch REMOVAL  Freq: EVERY 24 HOURS 2000 Route: TD  Start: 06/29/17 2000   Admin Instructions: Patient should have a 12 hour patch free interval                 0613 ( )-Patch Removed        0628 ( )-Patch Removed           lidocaine (LMX4) kit  Freq: EVERY 1 HOUR PRN Route: Top  PRN Reason: moderate pain  PRN Comment: with VAD insertion or accessing implanted port  Start:  "06/29/17 1421   Admin Instructions: Do NOT give if patient has a history of allergy to any local anesthetic or any \"meaghan\" product.   Apply 30 minutes prior to VAD insertion or port access.  MAX Dose:  2.5 g (  of 5 g tube)               lidocaine 1 % 1 mL  Dose: 1 mL Freq: EVERY 1 HOUR PRN Route: OTHER  PRN Comment: mild pain with VAD insertion or accessing implanted port  Start: 06/29/17 1421   Admin Instructions: Do NOT give if patient has a history of allergy to any local anesthetic or any \"meaghan\" product. MAX dose 1 mL subcutaneous OR intradermal in divided doses.               lipids (INTRALIPID) 20 % infusion 180 mL  Dose: 180 mL Freq: Once per day on Mon Tue Wed Thu Fri Route: IV  Start: 06/30/17 2000   Admin Instructions: Administer through a 1.2 micron filter    Of note, lipid order is only for 180 mL (rate of 15 mL/hr)  Requires container change every 12 hours and tubing change every 24 hours.          2009 ( )-New Bag            loperamide (IMODIUM) liquid 4 mg  Dose: 4 mg Freq: 4 TIMES DAILY Route: PER FEEDING   Indications of Use: DIARRHEA  Start: 06/30/17 1300   Admin Instructions: Hold for constipation.          (1300)-Not Given       1703 (4 mg)-Given       2111 (4 mg)-Given        0832 (4 mg)-Given       [ ] 1300       [ ] 1700       [ ] 2100           medication instructions  Freq: CONTINUOUS PRN Route: XX  Start: 06/29/17 1421   Admin Instructions: Routine PRN Nurse may request from Pharmacy a change of form of medication (e.g. Liquid to tablet).                 metoprolol (LOPRESSOR) suspension 25 mg  Dose: 25 mg Freq: 2 TIMES DAILY Route: PER J TUBE  Start: 06/29/17 2100   Admin Instructions: Shake well.  Hold for pulse <60         2145 (25 mg)-Given [C]        0930 (25 mg)-Given [C]       2112 (25 mg)-Given        0832 (25 mg)-Given       [ ] 2100           naloxone (NARCAN) injection 0.1-0.4 mg  Dose: 0.1-0.4 mg Freq: EVERY 2 MIN PRN Route: IV  PRN Reason: opioid reversal  Start: 06/29/17 " 1423   Admin Instructions: For respiratory rate LESS than or EQUAL to 8.  Partial reversal dose:  0.1 mg titrated q 2 minutes for Analgesia Side Effects Monitoring Sedation Level of 3 (frequently drowsy, arousable, drifts to sleep during conversation).Full reversal dose:  0.4 mg bolus for Analgesia Side Effects Monitoring Sedation Level of 4 (somnolent, minimal or no response to stimulation).               opium tincture 10 MG/ML (1%) liquid 6 mg  Dose: 6 mg Freq: EVERY 6 HOURS PRN Route: PER J TUBE  PRN Reason: diarrhea  Start: 07/01/17 0824              oxyCODONE (ROXICODONE) solution 10-20 mg  Dose: 10-20 mg Freq: EVERY 3 HOURS PRN Route: PER J TUBE  PRN Reason: moderate to severe pain  Start: 06/29/17 1421        1432 (20 mg)-Given       1749 (20 mg)-Given       2139 (15 mg)-Given        0202 (15 mg)-Given       0607 (15 mg)-Given       0941 (15 mg)-Given       1332 (15 mg)-Given       1802 (15 mg)-Given       2109 (15 mg)-Given        0004 (15 mg)-Given       0325 (10 mg)-Given       0630 (10 mg)-Given           pantoprazole (PROTONIX) suspension 40 mg  Dose: 40 mg Freq: DAILY Route: PER FEEDING   Start: 06/29/17 1500        1750 (40 mg)-Given        1347 (40 mg)-Given        [ ] 1400           parenteral nutrition - ADULT compounded formula CYCLE  Rate:  mL/hr Freq: CYCLE Route: CENTRAL LINE  Start: 06/30/17 2000   End: 07/01/17 1959   Admin Instructions: Infuse using a 0.22 micron filter.  Cycle TPN over 14 hours.  Infuse at 60 mL/h for the first hour,   increase to 110 mL/h for 16 hours,   decrease to 60 mL/h for the last hour, then stop TPN.    Nurse Instructions:  To discontinue TPN, decrease rate to   of current rate for 1 hour. May continue at   rate until bag runs out or for 24h.          2008 ( )-New Bag       2108 ( )-Rate/Dose Change        1959-Med Discontinued         Rate:  mL/hr Freq: CYCLE Route: CENTRAL LINE  Start: 06/29/17 2000   End: 06/30/17 1959   Admin Instructions: Infuse  using a 0.22 micron filter.  Cycle TPN over 14 hours.  Infuse at 60 mL/h for the first hour,   increase to 110 mL/h for 16 hours,   decrease to 60 mL/h for the last hour, then stop TPN.    Nurse Instructions:  To discontinue TPN, decrease rate to   of current rate for 1 hour. May continue at   rate until bag runs out or for 24h.         2038 ( )-New Bag        1959-Med Discontinued        Patient is already receiving anticoagulation with heparin, enoxaparin (LOVENOX), warfarin (COUMADIN)  or other anticoagulant medication  Freq: CONTINUOUS PRN Route: XX  Start: 06/29/17 1421              saccharomyces boulardii (FLORASTOR) capsule 250 mg  Dose: 250 mg Freq: 2 TIMES DAILY Route: PER FEEDING   Start: 06/29/17 2100   Admin Instructions: Capsules may be opened and sprinkled on semisolid food or added to beverage.         2140 (250 mg)-Given        0930 (250 mg)-Given       2013 (250 mg)-Given        0834 (250 mg)-Given       [ ] 2100           simethicone (MYLICON) suspension 40 mg  Dose: 40 mg Freq: 4 TIMES DAILY Route: PER FEEDING   Indications of Use: FLATULENCE  Start: 06/29/17 1700        1733 (40 mg)-Given       2136 (40 mg)-Given        0934 (40 mg)-Given       1348 (40 mg)-Given       1703 (40 mg)-Given       2112 (40 mg)-Given        0835 (40 mg)-Given       [ ] 1300       [ ] 1700       [ ] 2100           sodium chloride (PF) 0.9% PF flush 10 mL  Dose: 10 mL Freq: EVERY 7 DAYS Route: IK  Start: 06/29/17 1430   Admin Instructions: And Q1H PRN, to lock each CVC - Valved (Tunneled and Non-Tunneled) dormant lumen.         1737 (10 mL)-Given             sodium chloride (PF) 0.9% PF flush 10-20 mL  Dose: 10-20 mL Freq: EVERY 1 HOUR PRN Route: IK  PRN Reasons: line flush,post meds or blood draw  Start: 06/29/17 1421   Admin Instructions: to flush CVC - Open Ended (Tunneled and Non-Tunneled).   10 mL post IV meds; 20 mL post blood draw.               sodium chloride (PF) 0.9% PF flush 10-20 mL  Dose: 10-20 mL Freq:  EVERY 1 HOUR PRN Route: IK  PRN Reasons: line flush,post meds or blood draw  Start: 06/29/17 1421   Admin Instructions: to flush CVC - Valved (Tunneled and Non-Tunneled).   10 mL post IV meds; 20 mL post blood draw.          0552 (20 mL)-Given              Dose: 100 mg Freq: AT BEDTIME Route: PER G TUBE  Start: 06/29/17 2200   End: 07/01/17 0825        2140 (100 mg)-Given        2113 (100 mg)-Given        0825-Med Discontinued       tuberculin injection 5 Units  Dose: 5 Units Freq: EVERY 21 DAYS Route: ID  Start: 06/30/17 0900   End: 08/11/17 0859   Admin Instructions: For tuberculosis diagnostic without controls.  Read skin test in 48 to 72 hours.  Adjust reading time if needed based on time of administration.   Repeat Mantoux test 3 weeks after the initial test if result is negative.  If test is positive, RN charting the positive result should discontinue this order.          (4749)-Not Given [C]       1126 (5 Units)-Given           Future Medications  Medications 06/25/17 06/26/17 06/27/17 06/28/17 06/29/17 06/30/17 07/01/17       - Skin Test Reading -  Freq: EVERY 21 DAYS Route: XX  Start: 07/02/17 0900   End: 08/13/17 0859   Admin Instructions: For tuberculosis diagnostic:  Adjust reading time if needed based on time of administration.  Reading must occur between 48-72 hours after administration.               enoxaparin (LOVENOX) injection 40 mg  Dose: 40 mg Freq: EVERY 24 HOURS Route: SC  Indications of Use: ATRIAL FIBRILLATION  Start: 07/01/17 1200          [ ] 1200           traZODone (DESYREL) tablet 100 mg  Dose: 100 mg Freq: AT BEDTIME Route: PER J TUBE  Indications of Use: INSOMNIA  Start: 07/01/17 2200          [ ] 2200             Dose: 100 mg Freq: AT BEDTIME Route: PER G TUBE  Indications of Use: INSOMNIA  Start: 07/01/17 2200   End: 07/01/17 0826          0826-Med Discontinued      Completed Medications  Medications 06/25/17 06/26/17 06/27/17 06/28/17 06/29/17 06/30/17 07/01/17         Dose: 10 mg  Freq: ONCE Route: PO  Start: 06/29/17 1630   End: 06/29/17 1734        1734 (10 mg)-Given               Start: 07/01/17 0623   End: 07/01/17 0801   Admin Instructions: Lilian Gómez : zach kumariide           0801 ( )-Given          Discontinued Medications  Medications 06/25/17 06/26/17 06/27/17 06/28/17 06/29/17 06/30/17 07/01/17         Dose: 975 mg Freq: 3 TIMES DAILY Route: PO  Start: 06/29/17 1430   End: 06/29/17 1550   Admin Instructions: Maximum acetaminophen dose from all sources= 75 mg/kg/day not to exceed 4 grams/day.         [ ] 1430       1550-Med Discontinued           Dose: 1 packet Freq: 2 TIMES DAILY Route: PO  Start: 06/29/17 2100   End: 06/30/17 1257   Admin Instructions: Powder must be mixed with fluid before administration.  Schedule cholestyramine to be given one hour after other oral meds or four hours before (or as close to this as possible).           (8431)-Not Given        0933 (4 g)-Given       1257-Med Discontinued          Dose: 10 mL Freq: 4 TIMES DAILY Route: PO  Start: 06/29/17 1430   End: 06/29/17 1550   Admin Instructions: Hold for constipation         [ ] 1430       1550-Med Discontinued           Dose: 40 mg Freq: EVERY 24 HOURS Route: SC  Start: 06/30/17 1200   End: 06/30/17 1257         1131 (40 mg)-Given       1257-Med Discontinued          Dose: 25 mcg Freq: EVERY 72 HOURS Route: TD  Start: 06/29/17 1545   End: 06/30/17 1101   Admin Instructions: 125mcg every 72 hours  Used fentaNYL patches must be disposed of by sticking sides together and flushing down a toilet.         1727 (1 patch)-Given        1101-Med Discontinued          Dose: 300 mg Freq: DAILY Route: PER J TUBE  Start: 06/30/17 0800   End: 06/30/17 1257   Admin Instructions: Absorbed best on an empty stomach. If stomach upset occurs, can take with meals.          0931 (300 mg)-Given       1257-Med Discontinued          Dose: 300 mg Freq: EVERY 8 HOURS Route: PER FEEDING   Start: 06/29/17 2230   End: 06/30/17  1257        2146 (300 mg)-Given        0607 (300 mg)-Given       1257-Med Discontinued          Dose: 300 mg Freq: EVERY 8 HOURS Route: PO  Start: 06/29/17 1430   End: 06/29/17 1550        [ ] 1430       1550-Med Discontinued           Dose: 4 mg Freq: 4 TIMES DAILY Route: PER FEEDING   Start: 06/29/17 1700   End: 06/30/17 1257   Admin Instructions: Hold for constipation.         1733 (4 mg)-Given       2131 (4 mg)-Given        (0941)-Not Given       1257-Med Discontinued          Dose: 4 mg Freq: 4 TIMES DAILY Route: PO  Start: 06/29/17 1700   End: 06/29/17 1550   Admin Instructions: Hold for constipation.         1550-Med Discontinued      Medications 06/25/17 06/26/17 06/27/17 06/28/17 06/29/17 06/30/17 07/01/17

## 2017-06-29 NOTE — PLAN OF CARE
"Problem: Pain, Acute (Adult)  Goal: Identify Related Risk Factors and Signs and Symptoms  Related risk factors and signs and symptoms are identified upon initiation of Human [facility] Clinical Practice Guideline (CPG)   Outcome: Improving  AVSS.  O2 sats 97% on RA.  Abdomen soft/tender.  Pt tolerating TF's at 35cc/hr via J-tube (off from 6968-7746).  Pharyngostomy tube to LIS with thick green output.  Midline chest dressing CDI.  Pt voiding spont (adequate amounts).  Pt states \"adequate\" pain control with fentanyl patch and oxycodone 15mg q 3hrs prn.        "

## 2017-06-30 ENCOUNTER — APPOINTMENT (OUTPATIENT)
Dept: LAB | Facility: CLINIC | Age: 72
End: 2017-06-30
Attending: INTERNAL MEDICINE
Payer: MEDICARE

## 2017-06-30 LAB
ANION GAP SERPL CALCULATED.3IONS-SCNC: 8 MMOL/L (ref 3–14)
BUN SERPL-MCNC: 23 MG/DL (ref 7–30)
CALCIUM SERPL-MCNC: 8.2 MG/DL (ref 8.5–10.1)
CAMPYLOBACTER GROUP BY NAT: NOT DETECTED
CHLORIDE SERPL-SCNC: 105 MMOL/L (ref 94–109)
CMV DNA SPEC NAA+PROBE-ACNC: NORMAL [IU]/ML
CMV DNA SPEC NAA+PROBE-LOG#: NORMAL {LOG_IU}/ML
CO2 SERPL-SCNC: 26 MMOL/L (ref 20–32)
CREAT SERPL-MCNC: 0.36 MG/DL (ref 0.52–1.04)
ENTERIC PATHOGEN COMMENT: NORMAL
ERYTHROCYTE [DISTWIDTH] IN BLOOD BY AUTOMATED COUNT: 15.6 % (ref 10–15)
GFR SERPL CREATININE-BSD FRML MDRD: ABNORMAL ML/MIN/1.7M2
GLUCOSE BLDC GLUCOMTR-MCNC: 88 MG/DL (ref 70–99)
GLUCOSE SERPL-MCNC: 93 MG/DL (ref 70–99)
HCT VFR BLD AUTO: 30.9 % (ref 35–47)
HGB BLD-MCNC: 9.5 G/DL (ref 11.7–15.7)
MAGNESIUM SERPL-MCNC: 1.9 MG/DL (ref 1.6–2.3)
MCH RBC QN AUTO: 28.9 PG (ref 26.5–33)
MCHC RBC AUTO-ENTMCNC: 30.7 G/DL (ref 31.5–36.5)
MCV RBC AUTO: 94 FL (ref 78–100)
NOROVIRUS I AND II BY NAT: NOT DETECTED
PHOSPHATE SERPL-MCNC: 4.1 MG/DL (ref 2.5–4.5)
PLATELET # BLD AUTO: 377 10E9/L (ref 150–450)
POTASSIUM SERPL-SCNC: 4.3 MMOL/L (ref 3.4–5.3)
RBC # BLD AUTO: 3.29 10E12/L (ref 3.8–5.2)
ROTAVIRUS A BY NAT: NOT DETECTED
SALMONELLA SPECIES BY NAT: NOT DETECTED
SHIGA TOXIN 1 GENE BY NAT: NOT DETECTED
SHIGA TOXIN 2 GENE BY NAT: NOT DETECTED
SHIGELLA SP+EIEC IPAH STL QL NAA+PROBE: NOT DETECTED
SODIUM SERPL-SCNC: 139 MMOL/L (ref 133–144)
SPECIMEN SOURCE: NORMAL
VIBRIO GROUP BY NAT: NOT DETECTED
WBC # BLD AUTO: 7.8 10E9/L (ref 4–11)
YERSINIA ENTEROCOLITICA BY NAT: NOT DETECTED

## 2017-06-30 PROCEDURE — 00000146 ZZHCL STATISTIC GLUCOSE BY METER IP

## 2017-06-30 PROCEDURE — 36592 COLLECT BLOOD FROM PICC: CPT | Performed by: NURSE PRACTITIONER

## 2017-06-30 PROCEDURE — A9270 NON-COVERED ITEM OR SERVICE: HCPCS | Mod: GY | Performed by: INTERNAL MEDICINE

## 2017-06-30 PROCEDURE — 25000132 ZZH RX MED GY IP 250 OP 250 PS 637: Mod: GY | Performed by: NURSE PRACTITIONER

## 2017-06-30 PROCEDURE — 83735 ASSAY OF MAGNESIUM: CPT | Performed by: NURSE PRACTITIONER

## 2017-06-30 PROCEDURE — 25000125 ZZHC RX 250: Performed by: NURSE PRACTITIONER

## 2017-06-30 PROCEDURE — 84100 ASSAY OF PHOSPHORUS: CPT | Performed by: NURSE PRACTITIONER

## 2017-06-30 PROCEDURE — 87506 IADNA-DNA/RNA PROBE TQ 6-11: CPT | Performed by: NURSE PRACTITIONER

## 2017-06-30 PROCEDURE — 85027 COMPLETE CBC AUTOMATED: CPT | Performed by: NURSE PRACTITIONER

## 2017-06-30 PROCEDURE — 99304 1ST NF CARE SF/LOW MDM 25: CPT | Mod: AI | Performed by: HOSPITALIST

## 2017-06-30 PROCEDURE — 25000125 ZZHC RX 250: Performed by: INTERNAL MEDICINE

## 2017-06-30 PROCEDURE — A9270 NON-COVERED ITEM OR SERVICE: HCPCS | Mod: GY | Performed by: NURSE PRACTITIONER

## 2017-06-30 PROCEDURE — 99207 ZZC CDG-CODE CATEGORY CHANGED: CPT | Performed by: HOSPITALIST

## 2017-06-30 PROCEDURE — 25000132 ZZH RX MED GY IP 250 OP 250 PS 637: Mod: GY | Performed by: INTERNAL MEDICINE

## 2017-06-30 PROCEDURE — 25000128 H RX IP 250 OP 636: Performed by: NURSE PRACTITIONER

## 2017-06-30 PROCEDURE — 99207 ZZC CDG-HISTORY COMP: MEETS EXP. PROBLEM FOCUSED-DOWN CODED LACK OF ROS: CPT | Performed by: HOSPITALIST

## 2017-06-30 PROCEDURE — 12000022 ZZH R&B SNF

## 2017-06-30 PROCEDURE — 99212 OFFICE O/P EST SF 10 MIN: CPT

## 2017-06-30 PROCEDURE — 80048 BASIC METABOLIC PNL TOTAL CA: CPT | Performed by: NURSE PRACTITIONER

## 2017-06-30 PROCEDURE — 83520 IMMUNOASSAY QUANT NOS NONAB: CPT | Performed by: NURSE PRACTITIONER

## 2017-06-30 RX ORDER — CHOLESTYRAMINE 4 G/9G
1 POWDER, FOR SUSPENSION ORAL 2 TIMES DAILY
Status: DISCONTINUED | OUTPATIENT
Start: 2017-06-30 | End: 2017-07-07 | Stop reason: HOSPADM

## 2017-06-30 RX ORDER — GABAPENTIN 250 MG/5ML
300 SOLUTION ORAL EVERY 8 HOURS
Status: DISCONTINUED | OUTPATIENT
Start: 2017-06-30 | End: 2017-07-07 | Stop reason: HOSPADM

## 2017-06-30 RX ORDER — LOPERAMIDE HYDROCHLORIDE 1 MG/5ML
4 SOLUTION ORAL 4 TIMES DAILY
Status: DISCONTINUED | OUTPATIENT
Start: 2017-06-30 | End: 2017-07-07 | Stop reason: HOSPADM

## 2017-06-30 RX ADMIN — ANTISEPTIC SURGICAL SCRUB: 0.04 SOLUTION TOPICAL at 09:32

## 2017-06-30 RX ADMIN — OXYCODONE HYDROCHLORIDE 15 MG: 5 SOLUTION ORAL at 13:32

## 2017-06-30 RX ADMIN — OXYCODONE HYDROCHLORIDE 15 MG: 5 SOLUTION ORAL at 02:02

## 2017-06-30 RX ADMIN — Medication 10 ML: at 21:13

## 2017-06-30 RX ADMIN — CHOLESTYRAMINE 4 G: 4 POWDER, FOR SUSPENSION ORAL at 20:16

## 2017-06-30 RX ADMIN — POTASSIUM CHLORIDE: 2 INJECTION, SOLUTION, CONCENTRATE INTRAVENOUS at 20:08

## 2017-06-30 RX ADMIN — Medication 250 MG: at 09:30

## 2017-06-30 RX ADMIN — SIMETHICONE 40 MG: 20 SUSPENSION/ DROPS ORAL at 17:03

## 2017-06-30 RX ADMIN — CHLORHEXIDINE GLUCONATE 15 ML: 1.2 RINSE ORAL at 13:38

## 2017-06-30 RX ADMIN — Medication 10 ML: at 17:03

## 2017-06-30 RX ADMIN — HYDROCORTISONE: 10 CREAM TOPICAL at 21:13

## 2017-06-30 RX ADMIN — OXYCODONE HYDROCHLORIDE 15 MG: 5 SOLUTION ORAL at 21:09

## 2017-06-30 RX ADMIN — TRAZODONE HYDROCHLORIDE 100 MG: 50 TABLET ORAL at 21:13

## 2017-06-30 RX ADMIN — HYDROCORTISONE: 10 CREAM TOPICAL at 09:35

## 2017-06-30 RX ADMIN — LOPERAMIDE HYDROCHLORIDE 4 MG: 1 SOLUTION ORAL at 17:03

## 2017-06-30 RX ADMIN — Medication 10 ML: at 13:47

## 2017-06-30 RX ADMIN — ACETAMINOPHEN 975 MG: 160 SUSPENSION ORAL at 09:31

## 2017-06-30 RX ADMIN — SIMETHICONE 40 MG: 20 SUSPENSION/ DROPS ORAL at 09:34

## 2017-06-30 RX ADMIN — OXYCODONE HYDROCHLORIDE 15 MG: 5 SOLUTION ORAL at 18:02

## 2017-06-30 RX ADMIN — LIDOCAINE 2 PATCH: 50 PATCH CUTANEOUS at 17:02

## 2017-06-30 RX ADMIN — SIMETHICONE 40 MG: 20 SUSPENSION/ DROPS ORAL at 21:12

## 2017-06-30 RX ADMIN — I.V. FAT EMULSION: 20 EMULSION INTRAVENOUS at 20:09

## 2017-06-30 RX ADMIN — Medication 2 PACKET: at 11:30

## 2017-06-30 RX ADMIN — GABAPENTIN 300 MG: 250 SOLUTION ORAL at 21:12

## 2017-06-30 RX ADMIN — ACETAMINOPHEN 975 MG: 160 SUSPENSION ORAL at 13:33

## 2017-06-30 RX ADMIN — ENOXAPARIN SODIUM 40 MG: 40 INJECTION SUBCUTANEOUS at 11:31

## 2017-06-30 RX ADMIN — Medication 2 PACKET: at 09:34

## 2017-06-30 RX ADMIN — Medication 250 MG: at 20:13

## 2017-06-30 RX ADMIN — METOPROLOL TARTRATE 25 MG: 100 TABLET, FILM COATED ORAL at 09:30

## 2017-06-30 RX ADMIN — GABAPENTIN 300 MG: 250 SOLUTION ORAL at 06:07

## 2017-06-30 RX ADMIN — Medication 5 UNITS: at 11:26

## 2017-06-30 RX ADMIN — PANTOPRAZOLE SODIUM 40 MG: 40 TABLET, DELAYED RELEASE ORAL at 13:47

## 2017-06-30 RX ADMIN — METOPROLOL TARTRATE 25 MG: 100 TABLET, FILM COATED ORAL at 21:12

## 2017-06-30 RX ADMIN — Medication 2 PACKET: at 18:02

## 2017-06-30 RX ADMIN — CHLORHEXIDINE GLUCONATE 15 ML: 1.2 RINSE ORAL at 09:31

## 2017-06-30 RX ADMIN — ANTISEPTIC SURGICAL SCRUB: 0.04 SOLUTION TOPICAL at 21:12

## 2017-06-30 RX ADMIN — Medication 10 ML: at 09:40

## 2017-06-30 RX ADMIN — SODIUM CHLORIDE, PRESERVATIVE FREE 5 ML: 5 INJECTION INTRAVENOUS at 05:52

## 2017-06-30 RX ADMIN — ACETAMINOPHEN 975 MG: 160 SUSPENSION ORAL at 20:12

## 2017-06-30 RX ADMIN — CHLORHEXIDINE GLUCONATE 15 ML: 1.2 RINSE ORAL at 20:13

## 2017-06-30 RX ADMIN — MINERAL SUPPLEMENT IRON 300 MG / 5 ML STRENGTH LIQUID 100 PER BOX UNFLAVORED 300 MG: at 09:31

## 2017-06-30 RX ADMIN — OXYCODONE HYDROCHLORIDE 15 MG: 5 SOLUTION ORAL at 06:07

## 2017-06-30 RX ADMIN — CHOLESTYRAMINE 4 G: 4 POWDER, FOR SUSPENSION ORAL at 09:33

## 2017-06-30 RX ADMIN — OXYCODONE HYDROCHLORIDE 15 MG: 5 SOLUTION ORAL at 09:41

## 2017-06-30 RX ADMIN — SIMETHICONE 40 MG: 20 SUSPENSION/ DROPS ORAL at 13:48

## 2017-06-30 RX ADMIN — LOPERAMIDE HYDROCHLORIDE 4 MG: 1 SOLUTION ORAL at 21:11

## 2017-06-30 RX ADMIN — SODIUM CHLORIDE, PRESERVATIVE FREE 5 ML: 5 INJECTION INTRAVENOUS at 13:42

## 2017-06-30 NOTE — PLAN OF CARE
Problem: Goal Outcome Summary  Goal: Goal Outcome Summary  Patient slept between cares. She transferred in and out of bed independently. She c/o pain right shoulder and left side of the neck, relief with Oxycodone given at 0200, and applied heat pack on right shoulder. She has TPN infusing via PICC line in right arm.  Jejunostomy tube for tube feeding and medication administration. Suction tube exit site on left neck, suction set to intermittent suctioning; green drainage.  Tube feeding started at 0730 at 25 ml/hour and 0 ml for flush per patient request.

## 2017-06-30 NOTE — PROGRESS NOTES
CLINICAL NUTRITION SERVICES - ASSESSMENT NOTE     Nutrition Prescription    RECOMMENDATIONS FOR MDs/PROVIDERS TO ORDER:  None    Malnutrition Status:    Severe malnutrition in the context of chronic illness    Recommendations already ordered by Registered Dietitian (RD):  None today    Future/Additional Recommendations:  1. Before discontinuing TPN and relying of TF for 100% estimated nutrition needs, recommend pt be able to tolerate either:  a.  TwoCal HN @ 35 ml/hr continuously (840 ml/day) to provide 1680 kcals (42 kcal/kg), 71 g PRO (1.8 g/kg), 588 ml free H2O, 184 g CHO and 4 g Fiber daily.   -- If to provide 100% estimated fluid needs from TF/free water, would recommend 75 mL water flushes q2h so TF + free water = 1488 mL/day free water. May need to adjust this fluid volume recommendation pending hydration status.      OR       b. TwoCal HN @ 50 mL/hr x 16 hours (800 mL/day) to provide 1600 kcal (40 kcal/kg), 67 g PRO (1.7 g/kg), 175 g CHO, 73 g fat, 4 g fiber, and 560 mL free water daily  -- If to provide 100% estimated fluid needs from TF/free water, would recommend pt get an additional ~900 mL/day free water, method pending pt tolerance of free water administration.  Could be given as flushes of ~110 mL 8x/day.  Could also consider given free water via feed-and-flush system-- free water @ 55 mL/hr x 16 hours during TF cycle.  May need to adjust this fluid volume recommendation pending hydration status.     REASON FOR ASSESSMENT  Minerva Blanco is a/an 72 year old female assessed by the dietitian for Provider Order - Registered Dietitian to Assess and Order TF per Medical Nutrition Therapy Protocol    NUTRITION HISTORY  - Pt previously at [facility] on TPN (meeting 100% of needs) and TF via J-tube (TwoCal HN) both at rates listed below. She was tolerating TPN well and was recently cycled x14 hrs overnight on 6/28. She is gradually increasing TF rate but has diarrhea (30-40 loose stools while TF is  "running).    CURRENT NUTRITION ORDERS  Diet: NPO  TF via J-tube: TwoCal HN @ 35 mL/hr x 16 hours (560 mL/day, daytime only) to provide 1120 kcal (29 kcal/kg), 47 g PRO (1.2 g/kg), 123 g CHO, 51 g fat, 3 g fiber, and 392 mL/day free water meeting 78% energy and 76% protein needs per dosing weight 41 kg.    - TF Intakes: Pt reports turning off TF or turning down rate to 15 ml/hr x1 hr when she is feeling overly full. She then will turn it back up to goal rate infusion within an hour or two and does this 1-2 times per day.     TPN: Cycled @ 110 ml/hr x12 hrs + 60 ml/hr x1 hr before and after providing 1440 mL/day with 165 g dextrose, 65 g AA, and 180 mL 20% IV lipids 5 times per week which provides 1078 kcal (26 kcal/kg), 1.6 g/kg PRO, GIR 5.25 mg/kg/min, 0.9 g/kg/day fat and 24% kcal from fat per dosing weight 41 kg. This meets 75% estimated kcal needs from sole PN and 100% estimated PRO needs per dosing weight 40 kg.    - TPN + TF is providing 100% of energy and protein needs.     LABS  Labs reviewed    MEDICATIONS  Medications reviewed  - Ferrous Sulfate 300 mg syrup (via J tube)  - Nutrisource Fiber 2 pkt TID with meals    ANTHROPOMETRICS  Height: 152.4 cm (5' 0\")  Most Recent Weight: 40.9 kg (90 lb 1.6 oz)    IBW: 45 kg  BMI: Underweight BMI <18.5  Weight History: Pt has lost 4 lbs (4%) over the last 1 month and 8 lbs over the last 4 months. Over the last week, her wt has been stable between 89-90 lbs.  Wt Readings from Last 10 Encounters:   06/30/17 40.9 kg (90 lb 1.6 oz)   06/28/17 41.2 kg (90 lb 14.4 oz)   05/18/17 42.9 kg (94 lb 8 oz)   05/07/17 47.9 kg (105 lb 11.2 oz)   05/03/17 44.2 kg (97 lb 8 oz)   04/28/17 45.8 kg (100 lb 14.4 oz)   04/12/17 40.7 kg (89 lb 12.8 oz)   04/12/17 40.7 kg (89 lb 12.8 oz)   02/22/17 44.5 kg (98 lb)   02/22/17 44.7 kg (98 lb 9.6 oz)     Dosing Weight: 41 kg - current wt    ASSESSED NUTRITION NEEDS  Estimated Energy Needs: 3561-4412+ kcals/day (35 - 40+ kcals/kg)  Justification: " Repletion  Estimated Protein Needs: 62-82+ grams protein/day (1.5 - 2+ grams of pro/kg)  Justification: Repletion  Estimated Fluid Needs: (1 mL/kcal)   Justification: Per provider pending fluid status    PHYSICAL FINDINGS  See malnutrition section below.  Cachectic       MALNUTRITION  % Intake: Decreased intake does not meet criteria (reliant on TPN + TF to meet 100% of needs)  % Weight Loss: Up to 5% in 1 month (non-severe)  Subcutaneous Fat Loss: Facial region:  moderate, Upper arm:  Moderate/severe and Lower arm:  Moderate/severe per last RD note 6/21  Muscle Loss: Temporal, Facial & jaw region, Upper arm (bicep, tricep), Lower arm  (forearm), Upper leg (quadricep, hamstring), Patellar region,  and Posterior calf:  Moderate/severe  per last RD note 6/21  Fluid Accumulation/Edema: None noted  Malnutrition Diagnosis: Severe malnutrition in the context of chronic illness    NUTRITION DIAGNOSIS  Inadequate enteral nutrition infusion related to loose stools/abdominal cramping hindering consistent TF tolerances/intake at goal rate as evidenced by pt unable to tolerate TF goal rate x16 hrs without reducing rate for 1-2+ hrs/day and frequent diarrhea 30-40 times per day.     INTERVENTIONS  Implementation  Nutrition Education: Discussed TF/TPN goals with pt and what she needs to tolerate in order to d/c TPN. Pt is under the impression that she needs to tolerate TF @ 35 ml x 16 hrs to d/c TPN, however this is only meeting  78% energy and 76% protein needs per dosing weight 41 kg using the lower end of nutritional needs. Pt is not in agreement to either increase rate to 50 ml x16 hrs or continue 35 ml x 24 hrs.   Collaboration with other providers - discussed POC with pharmacy and orders were placed.      Goals  Total avg nutritional intake to meet a minimum of 26 kcal/kg (solely from PN) or 35 kcal/kg (PN+EN) and 1.5 g PRO/kg daily (per dosing wt 41 kg)     Monitoring/Evaluation  Progress toward goals will be monitored and  evaluated per protocol.      Chelo Coon RD  Unit Pager: [phone]      Betty Barrios RD

## 2017-06-30 NOTE — PROGRESS NOTES
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    Patient History  According to provider note(s):  72 year old female with PMH of atrial fibrillation, breast cancer s/p lumpectomy 2007, fibromyalgia, GERD, IBS, meniere's disease, MS and symptomatic hiatal hernia. S/p Toupet fundoplication 1/2016 c/b esophageal perforation with mediastinal phlegmon s/p initial proximal gastrectomy, distal esophagectomy, spit fistula creation, left thoracotomy with mediastinal phlegmon excision on 1/9/2017. Since that time, she has undergone mulptiple procedures for management including esophageal dilations, stent placement/removal, washouts, lysis of adhesions, jejunostomy feeding tube placement, retrosternal gastric pull-through, resection of left half of the manubrium, spit fistula takedown, right tube thoracostomy and pharyngostomy tube placement. She most recently was admitted 5/18 and diagnosed with anastomic leak with mediastical abscess. S/p mediastinal wound debridement with chest tube placement (5/19) with subsequent pharyngostomy tube 5/31, stent placement 6/4 with exchange 6/27 and multiple washouts (6/2, 6/4, 6/9, 6/14, 6/27). Hospital course complicated by diarrhea (r/t TFs), surgical pain (seen by palliative). Transferred to TCU 6/29/17 for further rehab needs and attempts to advance tolerance to TFs.     Objective Data   Containment of urine/stool: Bowel Program    Current Diet/ " Nutrition:  None      Output:   I/O last 3 completed shifts:  In: 90 [NG/GT:90]  Out: -     Skin Assessment: Sundar Sundar Score  Av.4  Min: 16  Max: 22                                Sundar Q No Data Recorded                     NSRAS No Data Recorded               Labs:   Recent Labs  Lab 17  0554  17  0658   HGB 9.5*  < > 8.7*   INR  --   --  1.12   WBC 7.8  < > 6.9   < > = values in this interval not displayed.      No lab results found in last 7 days.    Physical Exam    Skin assessment:   Focused skin inspection: Right chest      Wound Location:  Right chest  Wound History: See surgical history, per hospitalist she has a fistula form esophageal area that communicates with this wound  Measurements (length x width x depth, in cm) 1.0  cm x 2.0 cm  x  1.0 cm   Wound Base:  granulation with a soft boggy arae at proximal edge of wound bed%   Tunneling N/A  Periwound skin: intact  Color: normal and consistent with surrounding tissue  Temperature: normal   Drainage:, moderate  Description of drainage: green and cloudy  Odor: mild  Pain: absent    Interventions  Current support surface: Standard  Atmos Air  Visual inspection of wound(s) completed  Wound Care:was done per plan of care      Supplies: Reviewed  Education provided today: NA    Discussed plan of care with Patient and Physicians Assistant    Face to face time: 20 minutes

## 2017-06-30 NOTE — H&P
Expand All Collapse All    []Hide copied text  [facility]  Transitional Care Unit               Assessment and Plan:      Minerva is a 72 year old female with PMH of atrial fibrillation, breast cancer s/p lumpectomy 2007, fibromyalgia, GERD, IBS, meniere's disease, MS and symptomatic hiatal hernia. S/p Toupet fundoplication 1/2016 c/b esophageal perforation with mediastinal phlegmon s/p initial proximal gastrectomy, distal esophagectomy, spit fistula creation, left thoracotomy with mediastinal phlegmon excision on 1/9/2017. Since that time, she has undergone mulptiple procedures for management including esophageal dilations, stent placement/removal, washouts, lysis of adhesions, jejunostomy feeding tube placement, retrosternal gastric pull-through, resection of left half of the manubrium, spit fistula takedown, right tube thoracostomy and pharyngostomy tube placement. She most recently was admitted 5/18 and diagnosed with anastomic leak with mediastical abscess. S/p mediastinal wound debridement with chest tube placement (5/19) with subsequent pharyngostomy tube 5/31, stent placement 6/4 with exchange 6/27 and multiple washouts (6/2, 6/4, 6/9, 6/14, 6/27). Hospital course complicated by diarrhea (r/t TFs), surgical pain (seen by palliative). Transferred to TCU 6/29/17 for further rehab needs and attempts to advance tolerance to TFs.      Physical deconditioning. 2/2 complicated hospital/surgical course and underlying MS.   - Not on prior MS pharmacologic therapies.   - PT/OT consulted.      Anastomotic leak with mediastinal abscess. S/p mediastinal wound debridement with chest tube placement (5/19) with subsequent pharyngostomy tube 5/31, stent placement 6/4 with exchange 6/27 and multiple washouts (6/2, 6/4, 6/9, 6/14, 6/27). No antibiotic or antifungal needs. S/p wound cultures with normal autumn, candida albicans growth. Blood cultures NG. Afebrile. No leukocytosis. Still with sternal wound requiring  daily dressing changes.   - Continue pharyngostomy tube to LIS. Irrigate with 30cc q 8 hours.   - Continue NPO status with TF, FW flush support.  - Continue protonix 40mg daily  - Continue oral cares with peridex TID swish and spit.    - Continue wound care with kerlix packing daily followed by bandage cover. WOCN consult placed. Of note, having gastric drainage from wound prior to transfer.   - Discharge back to [facility] for EGD, washout 7/17 if not discharged.      Acute pain (right shoulder, surgical site). Right anterior shoulder pain 6/29 after lifting belongings. Likely Strain vs. Tendinitis based on exam.    - One time flexeril to see effect  - Heat to shoulder site prn.   - Continue pain control with fentanyl 100mcg every 72 hours (Per palliative, decrease by 12 mcg every 6 days starting on 6/30), oxycodone 10-20mg every 3 hours prn, lidoderm patch to left chest. If with escalated pain, consult palliative.      Severe malnutrition in the context of chronic illness. Transferred on TPN, TF combo 2/2 TF intolerance with inability to meet caloric needs.   - Nutrition consulted.   - Continue TPN, TFs, FWFs. Okay to wean TPN as felt appropriate from a nutrition standpoint if tolerating.   - Patient knowledgeable about TFs for d/c but would need TPN training.      Diarrhea 2/2 TFs with h/o IBS. S/p c-diff neg 5/27.   - work with nutrition to change TF. Its worth a try.   - Continue lomotil QID, imodium QID, florastor BID, fiber TID, and cholestyramine   - Opium of tincture prn used at hospital. Monitor frequency, OP. Not currently ordered.   - BMP, mag, phos screens MWF.     Atrial fibrillation. Chadsvasc 2. Preivously with coumadin use but held 2/2 surgical interventions as above. Per patient, atrial fib x 1 associated with surgery and told it was 2/2 irritation around the heart. PTA on metoprolol 25mg BID but no h/o HTN. Rate currently controlled. S/p echo 1/11/17 with EF >70%, mild pulmonary HTN, biatrial  enlargement, mild mitral valve annular calcification and mild aortic valve sclerosis.   - Continue metoprolol. Per patient, no h/o HTN. Warner atrial fib was an isolated episode and would like to stop taking if able. EKG 7/2 planned. If sinus rhythm, could trial off med.   Continue ppx lovenox. Chadsvasc risk low. High risk for aprupt clinical changes requiring surgical intervention. Urge OP PCP vs. Cardiology visit to set up holter and determine frequency of atrial fib to assist with coumadin recommendations.       Diet and/or tube feedings: NPO with TFs, TPN.  Lines, tubes, drains: PICC  DVT/GI prophylaxis: Lovenox ppx.   Indications for psychotropic medications: None.   Code status discussed on admission: Full Code          Consults:      PT/OT  WOCN  Nutrition/Pharmacy          History of Present Illness:      Minerva is a 72 year old female with PMH of atrial fibrillation, breast cancer s/p lumpectomy 2007, fibromyalgia, GERD, IBS, meniere's disease, MS and symptomatic hiatal hernia. S/p Toupet fundoplication 1/2016 c/b esophageal perforation with mediastinal phlegmon s/p initial proximal gastrectomy, distal esophagectomy, spit fistula creation, left thoracotomy with mediastinal phlegmon excision on 1/9/2017. Since that time, she has undergone mulptiple procedures for management including esophageal dilations, stent placement/removal, washouts, lysis of adhesions, jejunostomy feeding tube placement, retrosternal gastric pull-through, resection of left half of the manubrium, spit fistula takedown, right tube thoracostomy and pharyngostomy tube placement. She most recently was admitted 5/18 and diagnosed with anastomic leak with mediastical abscess. S/p mediastinal wound debridement with chest tube placement (5/19) with subsequent pharyngostomy tube 5/31, stent placement 6/4 with exchange 6/27 and multiple washouts (6/2, 6/4, 6/9, 6/14, 6/27). Hospital course complicated by diarrhea (r/t TFs), surgical pain  "(palliative care assistance). Transferred to TCU 6/29/17 for further rehab needs and attempts to advance tolerance to TFs.      Currently, patient states her right shoulder just started hurting. States the pain started after lifting up some of her belongings to come over here. No history of shoulder pain. Pain is to the anterior part of the shoulder. Not affecting movements. Pain comes and goes in short spurts with involuntary movements to her arm. Sharp and painful when present. No shoulder redness, warmth noted. Sensation to digit intact. No discoloration. Mentions PICC to this arm but PICC site is not painful. No localized swelling. No fevers.      States ongoing diarrhea from TFs. During the day, diarrhea is well controlled. Once TFs start, can have diarrhea about 15-20 times during the night. Mentions she needs a \"commode station\" as she does not have much notice to get to the bathroom. States in the hospital they were trieng everything to help including increasing fiber, changing TF formula, adding anti-diarrheal meds. States at times her abdomen has generalized discomfort but this is tolerable. Bloating present with elevated TF rates too. States she was told if she can do 35cc/hr for 16 hours for 7-10 days straight, she can go home.      Denies any acute cardiac like CP, SOB. No coughing. No ST.   Surgical wounds without acute changes.   Jejunostomy site stable without drainage.              Physical Exam:      /55 (BP Location: Left arm)  Pulse 78  Temp 96.5  F (35.8  C) (Oral)  Resp 16  Ht 1.524 m (5')  Wt 41.3 kg (91 lb)  SpO2 100%  BMI 17.77 kg/m2      Vitals are reviewed and stable.   GENERAL: Alert and oriented x 3. NAD.  at bedside.   HEENT: Anicteric sclera. PERRLa intact but right pupil 5mm vs. left pupil 2mm. Mucous membranes moist. No thrush. Swallow intact. Pharyngostomy tube to left neck noted. Mild crusting to outer surface and faint surrounding redness but otherwise stable. "   CV: RRR. S1, S2. No murmurs appreciated.   RESPIRATORY: Effort normal with sitting and talking activity. Lungs CTA a/p with no wheezing, rales, rhonchi. On RA.   GI: Abdomen soft and non distended with normoactive bowel sounds present in all quadrants. No tenderness. Jejunostomy tube normal. No surrounding irritation.   MUSCULOSKELETAL: No joint swelling or tenderness. Moves all extremities with ease. Right shoulder with anterior palpable pain. Abduction, adduction all intact. Anterior rotation painful. No rotator cuff instability. Joint without acute swelling, redness, warmth.   NEUROLOGICAL: No focal deficits. Sensation to distal extremities intact. Facial symmetry intact. Asymmetrical pupils as above. No involuntary movements.   EXTREMITIES: No peripheral edema. Intact bilateral pedal and posttibial pulses.   SKIN: No generalized jaundice. No acute rashes or sores to exposed body areas. Chest wound x 2 noted. Superior wound closed with scabbing present. Inferior incision with bandage over site. Later WOCN did evaluation with picture of site as follows.                Past Medical History:       Past Medical History         Past Medical History:   Diagnosis Date     Atrial fibrillation (H)       Breast cancer (H) 2007     right side - lumpectomy, chemo, and local radiation     Chronic infection       infection/wound for two years after esoph surgery     Esophageal perforation       Fibromyalgia       GERD (gastroesophageal reflux disease)       Hiatal hernia       History of blood transfusion       IBS (irritable bowel syndrome)       Meniere's disease       deaf left ear     MS (multiple sclerosis) (H)       Noninfectious ileitis       Other chronic pain                   Past Surgical History:        Past Surgical History          Past Surgical History:   Procedure Laterality Date     APPENDECTOMY         BACK SURGERY   5/1/2015     lumbar fusion     BRONCHOSCOPY FLEXIBLE N/A 4/17/2017     Procedure:  BRONCHOSCOPY FLEXIBLE;;  Surgeon: Jens Wise MD;  Location: [facility]     C GASTROSTOMY/JEJUN TUBE         J tube for feedings     CLOSE SPIT FISTULA N/A 4/17/2017     Procedure: CLOSE SPIT FISTULA;;  Surgeon: Jens Wise MD;  [facility]     COMBINED ESOPHAGOSCOPY, GASTROSCOPY, DUODENOSCOPY (EGD), REMOVE ESOPHAGEAL STENT N/A 8/26/2016     Procedure: COMBINED ESOPHAGOSCOPY, GASTROSCOPY, DUODENOSCOPY (EGD), REMOVE ESOPHAGEAL STENT;  Surgeon: Jens Wise MD;  [facility]     CREATE SPIT FISTULA N/A 1/9/2017     Procedure: CREATE SPIT FISTULA;  Surgeon: Jens Wise MD;  [facility]     ESOPHAGECTOMY N/A 1/9/2017     Procedure: ESOPHAGECTOMY;  Surgeon: Jens Wise MD;  [facility]     ESOPHAGOSCOPY, GASTROSCOPY, DUODENOSCOPY (EGD), COMBINED N/A 4/18/2016     Procedure: COMBINED ESOPHAGOSCOPY, GASTROSCOPY, DUODENOSCOPY (EGD);  Surgeon: Alexis Barraza MD;  [facility]     ESOPHAGOSCOPY, GASTROSCOPY, DUODENOSCOPY (EGD), COMBINED N/A 4/25/2016     Procedure: COMBINED ESOPHAGOSCOPY, GASTROSCOPY, DUODENOSCOPY (EGD);  Surgeon: Alexis Barraza MD;  [facility]     ESOPHAGOSCOPY, GASTROSCOPY, DUODENOSCOPY (EGD), COMBINED N/A 5/4/2016     Procedure: COMBINED ESOPHAGOSCOPY, GASTROSCOPY, DUODENOSCOPY (EGD);  Surgeon: Alexis Barraza MD;  [facility]     ESOPHAGOSCOPY, GASTROSCOPY, DUODENOSCOPY (EGD), COMBINED N/A 5/18/2016     Procedure: COMBINED ESOPHAGOSCOPY, GASTROSCOPY, DUODENOSCOPY (EGD);  Surgeon: Alexis Barraza MD;  [facility]     ESOPHAGOSCOPY, GASTROSCOPY, DUODENOSCOPY (EGD), COMBINED N/A 6/22/2016     Procedure: COMBINED ESOPHAGOSCOPY, GASTROSCOPY, DUODENOSCOPY (EGD);  Surgeon: Alexis Barraza MD;  [facility]     ESOPHAGOSCOPY, GASTROSCOPY, DUODENOSCOPY (EGD), COMBINED N/A 7/12/2016     Procedure: COMBINED ESOPHAGOSCOPY, GASTROSCOPY, DUODENOSCOPY (EGD);  Surgeon:  Jens Wise MD;  [facility]     ESOPHAGOSCOPY, GASTROSCOPY, DUODENOSCOPY (EGD), COMBINED N/A 4/21/2017     Procedure: COMBINED ESOPHAGOSCOPY, GASTROSCOPY, DUODENOSCOPY (EGD);  Esophagogastroduodenoscopy, Pharyngostomy Tube Placement ;  Surgeon: Jens Wise MD;  [facility]     ESOPHAGOSCOPY, GASTROSCOPY, DUODENOSCOPY (EGD), COMBINED N/A 5/19/2017     Procedure: COMBINED ESOPHAGOSCOPY, GASTROSCOPY, DUODENOSCOPY (EGD);  Upper Endoscopy, Irrigation and Debridement of Chest Wound ;  Surgeon: Nalini Barreto MD;  [facility]     ESOPHAGOSCOPY, GASTROSCOPY, DUODENOSCOPY (EGD), COMBINED N/A 5/23/2017     Procedure: COMBINED ESOPHAGOSCOPY, GASTROSCOPY, DUODENOSCOPY (EGD);;  Surgeon: Nalini Barreto MD;  [facility]     ESOPHAGOSCOPY, GASTROSCOPY, DUODENOSCOPY (EGD), COMBINED N/A 6/27/2017     Procedure: COMBINED ESOPHAGOSCOPY, GASTROSCOPY, DUODENOSCOPY (EGD);  Esophagogastroduodenoscopy With Removal And Replacement Of Esophageal Stent exchange. Exchange of pharyngtomy tube. Chest wound dressing change;  Surgeon: Nalini Barreto MD;  [facility]     ESOPHAGOSCOPY, GASTROSCOPY, DUODENOSCOPY (EGD), DILATATION, COMBINED N/A 6/29/2016     Procedure: COMBINED ESOPHAGOSCOPY, GASTROSCOPY, DUODENOSCOPY (EGD), DILATATION;  Surgeon: Jens Wise MD;  [facility]     EXPLORE NECK N/A 5/19/2017     Procedure: EXPLORE NECK;;  Surgeon: Nalini Barreto MD;  [facility]     HC UGI ENDOSCOPY W TRANSENDOSCOPIC STENT PLACEMENT N/A 7/12/2016     Procedure: COMBINED ESOPHAGOSCOPY, GASTROSCOPY, DUODENOSCOPY (EGD), PLACE TRANSENDOSCOPIC ESOPHAGEAL STENT;  Surgeon: Jens Wise MD;  [facility]     HC UGI ENDOSCOPY W TRANSENDOSCOPIC STENT PLACEMENT N/A 7/22/2016     Procedure: COMBINED ESOPHAGOSCOPY, GASTROSCOPY, DUODENOSCOPY (EGD), PLACE TRANSENDOSCOPIC ESOPHAGEAL STENT;  Surgeon: Jens Wise MD;  [facility]     INCISION AND DRAINAGE CHEST  WASHOUT, COMBINED N/A 5/27/2017     Procedure: COMBINED INCISION AND DRAINAGE CHEST WASHOUT;  Chest washout;  Surgeon: Nalini Barreto MD;  [facility]     INCISION AND DRAINAGE CHEST WASHOUT, COMBINED N/A 5/28/2017     Procedure: COMBINED INCISION AND DRAINAGE CHEST WASHOUT;  Chest washout;  Surgeon: Nalini Barreto MD;  [facility]     INCISION AND DRAINAGE CHEST WASHOUT, COMBINED N/A 6/9/2017     Procedure: COMBINED INCISION AND DRAINAGE CHEST WASHOUT;  Chest washout and dressing change, Esophaogastroduodenoscopy;  Surgeon: Jens Wise MD;  [facility]     IRRIGATION AND DEBRIDEMENT CHEST WASHOUT, COMBINED N/A 6/29/2016     Procedure: COMBINED IRRIGATION AND DEBRIDEMENT CHEST WASHOUT;  Surgeon: Jens Wise MD;  [facility]     IRRIGATION AND DEBRIDEMENT CHEST WASHOUT, COMBINED N/A 5/23/2017     Procedure: COMBINED IRRIGATION AND DEBRIDEMENT CHEST WASHOUT;  COMBINED IRRIGATION AND DEBRIDEMENT CHEST WASHOUT,COMBINED ESOPHAGOSCOPY, GASTROSCOPY, DUODENOSCOPY (EGD) ;  Surgeon: Nalini Barreto MD;  [facility]     IRRIGATION AND DEBRIDEMENT CHEST WASHOUT, COMBINED N/A 5/24/2017     Procedure: COMBINED IRRIGATION AND DEBRIDEMENT CHEST WASHOUT;  Chest wound Washout and Dressing Change;  Surgeon: Nalini Barreto MD;  [facility]     IRRIGATION AND DEBRIDEMENT CHEST WASHOUT, COMBINED N/A 5/25/2017     Procedure: COMBINED IRRIGATION AND DEBRIDEMENT CHEST WASHOUT;  Irrigation and Debridement Chest Washout and dressing change;  Surgeon: Nalini Barreto MD;  [facility]     IRRIGATION AND DEBRIDEMENT CHEST WASHOUT, COMBINED N/A 5/26/2017     Procedure: COMBINED IRRIGATION AND DEBRIDEMENT CHEST WASHOUT;  Irrigation and Debridement Chest Washout with  dressing change;  Surgeon: Nalini Barreto MD;  [facility]     IRRIGATION AND DEBRIDEMENT CHEST WASHOUT, COMBINED N/A 5/30/2017     Procedure: COMBINED IRRIGATION AND DEBRIDEMENT CHEST WASHOUT;  Irrigation and Debridement  Chest Washout , PICC line dressing change;  Surgeon: Nalini Barreto MD;  [facility]     IRRIGATION AND DEBRIDEMENT CHEST WASHOUT, COMBINED N/A 5/31/2017     Procedure: COMBINED IRRIGATION AND DEBRIDEMENT CHEST WASHOUT;  Irrigation and Debridement Chest Washout ;  Surgeon: Nalini Barreto MD;  [facility]     IRRIGATION AND DEBRIDEMENT CHEST WASHOUT, COMBINED N/A 6/1/2017     Procedure: COMBINED IRRIGATION AND DEBRIDEMENT CHEST WASHOUT;  Chest Wound Irrigation And Debridement with dressing change ;  Surgeon: Nalini Barreto MD;  [facility]     IRRIGATION AND DEBRIDEMENT CHEST WASHOUT, COMBINED N/A 6/4/2017     Procedure: COMBINED IRRIGATION AND DEBRIDEMENT CHEST WASHOUT;  COMBINED IRRIGATION AND DEBRIDEMENT CHEST WASHOUT, Esophagoscopy, Gastroscopy, Duodenoscopy (EGD) place transendoscopic esophageal stent,   Pharyngostomy and chest wall tube exchange  C-Arm;  Surgeon: Jens Wise MD;  [facility]     IRRIGATION AND DEBRIDEMENT CHEST WASHOUT, COMBINED N/A 6/2/2017     Procedure: COMBINED IRRIGATION AND DEBRIDEMENT CHEST WASHOUT;  Chest Wound Irrigation and Debridement, Dressing Change;  Surgeon: Nalini Barreto MD;  [facility]     LAPAROSCOPIC ASSISTED INSERTION TUBE JEJUNOSTOMY N/A 4/17/2017     Procedure: LAPAROSCOPIC ASSISTED INSERTION TUBE JEJUNOSTOMY;;  Surgeon: Jens Wise MD;  [facility]     LAPAROSCOPY DIAGNOSTIC (GENERAL) N/A 1/9/2017     Procedure: LAPAROSCOPY DIAGNOSTIC (GENERAL);  Surgeon: Jens Wise MD;  [facility]     LAPAROSCOPY DIAGNOSTIC (GENERAL) N/A 4/17/2017     Procedure: LAPAROSCOPY DIAGNOSTIC (GENERAL);  Laparoscopic , Neck Dissection, Spit Fistula Takedown, Laparoscopic Jejunostomy Tube and Pharyngostomy Tube, Gastric Pull up, Upper Endoscopy(EGD)  , Flexible Bronchoscopy ,Sternotomy ;  Surgeon: Jens Wise MD;  [facility]     LUMPECTOMY BREAST Right 2007     NERVE BLOCK PERIPHERAL N/A 8/30/2016      "Procedure: NERVE BLOCK PERIPHERAL;  Surgeon: GENERIC ANESTHESIA PROVIDER;  Location: [facility]     NISSEN FUNDOPLICATION   1/2016     PHARYNGOSTOMY N/A 4/18/2016     Procedure: PHARYNGOSTOMY;  Surgeon: Alexis Barraza MD;  [facility]     PHARYNGOSTOMY N/A 4/25/2016     Procedure: PHARYNGOSTOMY;  Surgeon: Alexis Barraza MD;  [facility]     PHARYNGOSTOMY N/A 5/4/2016     Procedure: PHARYNGOSTOMY;  Surgeon: Alexis Barraza MD;  [facility]     PHARYNGOSTOMY N/A 6/22/2016     Procedure: PHARYNGOSTOMY;  Surgeon: Alexis Barraza MD;  [facility]     PHARYNGOSTOMY N/A 6/29/2016     Procedure: PHARYNGOSTOMY;  Surgeon: Jens Wise MD;  [facility]     PHARYNGOSTOMY N/A 4/21/2017     Procedure: PHARYNGOSTOMY;;  Surgeon: Jens Wise MD;  [facility]     PHARYNGOSTOMY Left 5/31/2017     Procedure: PHARYNGOSTOMY;;  Surgeon: Nalini Barreto MD;  [facility]     PICC INSERTION Left 8/25/2016     5fr DL BioFlo PICC, 42cm (3cm external) in the L medial brachial vein w/ tip in the SVC RA junction.     PICC INSERTION - Rewire Right 05/31/2017     5fr DL BioFlo PICC, 40cm (6cm external) in the R medial brachial vein w/ tip in the SVC RA junction.     THORACOTOMY Right 1998     lung infection - \"hard crust formed on lung\"     THORACOTOMY Left 1/9/2017     Procedure: THORACOTOMY;  Surgeon: Jens Wise MD;  [facility]     WRIST SURGERY                     Family History:       Family History           Family History   Problem Relation Age of Onset     Alzheimer Disease Mother       CEREBROVASCULAR DISEASE Father         cerebral hemorrhage     Ovarian Cancer Sister       Breast Cancer Daughter                Family Status   Relation Status     Mother       Father       Sister       Daughter                Social History:            Social History   Substance Use Topics     Smoking status: Former Smoker       Packs/day: " .00       Years: 15.00       Types: Cigarettes       Quit date: 1998     Smokeless tobacco: Not on file     Alcohol use No      Social History          Social History Narrative     Retired realtor.  Lives with  Richard. 2 children alive and well.      Living situation prior to admission: Lives with .           Medications:      Current Facility-Administered Medications on File Prior to Encounter:  [DISCONTINUED] gabapentin (NEURONTIN) solution 300 mg   [DISCONTINUED] parenteral nutrition - ADULT compounded formula CYCLE   [] parenteral nutrition - ADULT compounded formula   [DISCONTINUED] opium tincture 10 MG/ML (1%) liquid 6 mg   [DISCONTINUED] lipids (INTRALIPID) 20 % infusion 180 mL   [DISCONTINUED] acetaminophen (TYLENOL) tablet 975 mg   [DISCONTINUED] hydrocortisone (CORTAID) 1 % cream   [DISCONTINUED] diphenhydrAMINE (BENADRYL) solution 25 mg   [DISCONTINUED] diphenhydrAMINE-zinc acetate (BENADRYL) cream   [DISCONTINUED] fiber modular (NUTRISOURCE FIBER) packet 2 packet   [DISCONTINUED] cholestyramine (QUESTRAN) Packet 4 g   [DISCONTINUED] simethicone (MYLICON) suspension 40 mg   [DISCONTINUED] lidocaine (XYLOCAINE) 5 % ointment   [DISCONTINUED] fentaNYL (DURAGESIC) Patch in Place   [DISCONTINUED] fentaNYL (DURAGESIC) patch REMOVAL   [DISCONTINUED] fentaNYL (DURAGESIC) 100 mcg/hr 72 hr patch 1 patch   [DISCONTINUED] oxyCODONE (ROXICODONE) solution 10-20 mg   [DISCONTINUED] Benzocaine (HURRICAINE/TOPEX) 20 % spray   [DISCONTINUED] saccharomyces boulardii (FLORASTOR) capsule 250 mg   [DISCONTINUED] enoxaparin (LOVENOX) injection 40 mg   [DISCONTINUED] bacitracin ointment   [DISCONTINUED] chlorhexidine (PERIDEX) 0.12 % solution 15 mL   [DISCONTINUED] chlorhexidine (HIBICLENS) 4 % liquid   [DISCONTINUED] lidocaine (LIDODERM) 5 % Patch 1 patch   [DISCONTINUED] lidocaine (LIDODERM) patch REMOVAL   [DISCONTINUED] lidocaine (LIDODERM) patch in PLACE   [DISCONTINUED] lidocaine 1 % 0.5-5 mL    [DISCONTINUED] lidocaine (LMX4) kit   [DISCONTINUED] sodium chloride (PF) 0.9% PF flush 5-50 mL   [DISCONTINUED] lidocaine 1 % 1 mL   [DISCONTINUED] lidocaine (LMX4) kit   [DISCONTINUED] sodium chloride (PF) 0.9% PF flush 10-20 mL   [DISCONTINUED] heparin lock flush 10 UNIT/ML injection 5-10 mL   [DISCONTINUED] heparin lock flush 10 UNIT/ML injection 5-10 mL   [DISCONTINUED] benzocaine-menthol (CHLORASEPTIC) 6-10 MG lozenge 1 lozenge   [DISCONTINUED] metoprolol (LOPRESSOR) suspension 25 mg   [DISCONTINUED] sodium chloride (PF) 0.9% PF flush 10-20 mL   [DISCONTINUED] sodium chloride (PF) 0.9% PF flush 10 mL   [DISCONTINUED] magnesium sulfate 2 g in NS intermittent infusion (Phar[employer] or [facility] Cmpd)   [DISCONTINUED] magnesium sulfate 4 g in 100 mL sterile water (premade)   [DISCONTINUED] potassium phosphate 15 mmol in D5W 250 mL intermittent infusion   [DISCONTINUED] potassium phosphate 20 mmol in D5W 500 mL intermittent infusion   [DISCONTINUED] potassium phosphate 20 mmol in D5W 250 mL intermittent infusion   [DISCONTINUED] potassium phosphate 25 mmol in D5W 500 mL intermittent infusion   [DISCONTINUED] sodium chloride (PF) 0.9% PF flush 3 mL   [DISCONTINUED] sodium chloride (PF) 0.9% PF flush 3 mL   [DISCONTINUED] traZODone (DESYREL) tablet 100 mg   [DISCONTINUED] diphenoxylate-atropine (LOMOTIL) liquid 10 mL   [DISCONTINUED] loperamide (IMODIUM) liquid 4 mg   [DISCONTINUED] pantoprazole (PROTONIX) suspension 40 mg   [DISCONTINUED] prochlorperazine (COMPAZINE) tablet 5 mg   [DISCONTINUED] prochlorperazine (COMPAZINE) injection 5 mg   [DISCONTINUED] prochlorperazine (COMPAZINE) Suppository 12.5 mg   [DISCONTINUED] dextrose 10 % 1,000 mL infusion   [DISCONTINUED] glucose 40 % gel 15-30 g   [DISCONTINUED] dextrose 50 % injection 25-50 mL   [DISCONTINUED] glucagon injection 1 mg   [DISCONTINUED] artificial saliva (BIOTENE DRY MOUTHWASH) liquid 20 mL   [DISCONTINUED] ipratropium - albuterol 0.5 mg/2.5 mg/3 mL (DUONEB)  neb solution 3 mL   [DISCONTINUED] labetalol (NORMODYNE/TRANDATE) injection 10-40 mg   [DISCONTINUED] albuterol (PROAIR HFA/PROVENTIL HFA/VENTOLIN HFA) Inhaler 4 puff   [DISCONTINUED] albuterol neb solution 2.5 mg   [DISCONTINUED] sodium chloride (PF) 0.9% PF flush 3 mL   [DISCONTINUED] sodium chloride (PF) 0.9% PF flush 3 mL   [DISCONTINUED] naloxone (NARCAN) injection 0.1-0.4 mg   [DISCONTINUED] ondansetron (ZOFRAN-ODT) ODT tab 4 mg   [DISCONTINUED] ondansetron (ZOFRAN) injection 4 mg   [DISCONTINUED] ferrous sulfate 300 (60 FE) MG/5ML syrup 300 mg   [DISCONTINUED] bupivacaine 0.25 % - EPINEPHrine 1:200,000 injection      Current Outpatient Prescriptions on File Prior to Encounter:  chlorhexidine (HIBICLENS) 4 % liquid Apply topically 2 times daily   simethicone (MYLICON) 40 MG/0.6ML suspension 0.6 mLs (40 mg) by Per Feeding Tube route 4 times daily   enoxaparin (LOVENOX) 40 MG/0.4ML injection Inject 0.4 mLs (40 mg) Subcutaneous every 24 hours   oxyCODONE (ROXICODONE) 5 MG/5ML solution 10-20 mLs (10-20 mg) by Per J Tube route every 3 hours as needed for moderate to severe pain   oxyCODONE (ROXICODONE) 10 MG IR tablet Take 1 to 1.5 tablet every 3-4 hours PRN pain   diphenoxylate-atropine (LOMOTIL) 0.5-0.005 mg/mL liquid Take 5 mLs by mouth 4 times daily as needed for diarrhea   fiber modular (NUTRISOURCE FIBER) packet 1 packet by Per Feeding Tube route 3 times daily (with meals)   metoprolol (LOPRESSOR) 10 mg/mL SUSP 2.5 mLs (25 mg) by Per J Tube route 2 times daily   opium tincture 10 mg/mL (1%) liquid Take 0.6 mLs (6 mg) by mouth every 6 hours   ferrous sulfate 300 (60 FE) MG/5ML syrup 5 mLs (300 mg) by Per J Tube route daily   gabapentin (NEURONTIN) 250 MG/5ML solution Take 6 mLs (300 mg) by mouth every 8 hours   traZODone (DESYREL) 100 MG tablet 0.5 tablets (50 mg) by Per G Tube route At Bedtime (Patient taking differently: 100 mg by Per G Tube route At Bedtime )   ranitidine (ZANTAC) 150 MG/10ML syrup Take 10  mLs (150 mg) by mouth 2 times daily   order for DME Equipment being ordered: [facility] Suction Machine-IntermittentSuction Canisters(2)Suction Canister Holders(2)Suction Connect Tube(2)5 in 1 Connector(2)Bacteria Filter(2)Yaunkauer Suction(2)Treatment Diagnosis: Esophogeal Perforation, s/p esophagectomy   order for DME Equipment being ordered: Suction PumpSuction Canister(2)Suction Tubing(2)Bacteria Filter(2)[employer]auer(4)Red Rubber Catheter(2)Treatment Diagnosis: Esophogeal Perforation, s/p esophagectomy   DiphenhydrAMINE HCl (BENADRYL PO) Take 25 mg by mouth nightly as needed   cholestyramine (QUESTRAN) 4 G Packet Take 1 packet by mouth daily   multivitamins with minerals (CERTAVITE/CEROVITE) LIQD liquid Take 15 mLs by mouth daily   loperamide (IMODIUM) 1 MG/5ML liquid Take 20 mLs (4 mg) by mouth 4 times daily as needed for diarrhea (Patient taking differently: Take 4 mg by mouth 2 times daily )   Lactobacillus Rhamnosus, GG, (CULTURELLE PO) Take 1 tablet by mouth 2 times daily               Allergies:            Allergies   Allergen Reactions     Amoxicillin Diarrhea     Ativan [Lorazepam] Other (See Comments)       Hallucinations     Hydromorphone Itching       4/12/17 - patient open to using this as she tolerated Hydromorphone PCA during hospitalization in January 2017.      Morphine Itching              Labs:            Lab Results   Component Value Date     WBC 8.2 06/29/2017     WBC 6.9 06/26/2017     WBC 8.5 06/22/2017     HGB 9.3 (L) 06/29/2017     HGB 8.7 (L) 06/26/2017     HGB 8.6 (L) 06/22/2017     HCT 30.3 (L) 06/29/2017     HCT 29.1 (L) 06/26/2017     HCT 28.1 (L) 06/22/2017      06/29/2017      06/27/2017      06/26/2017      06/29/2017      06/26/2017      06/22/2017     POTASSIUM 4.1 06/29/2017     POTASSIUM 4.0 06/26/2017     POTASSIUM 3.9 06/22/2017     CHLORIDE 103 06/29/2017     CHLORIDE 103 06/26/2017     CHLORIDE 103 06/22/2017     CO2 27 06/29/2017     CO2 30  06/26/2017     CO2 27 06/22/2017     BUN 23 06/29/2017     BUN 22 06/26/2017     BUN 19 06/22/2017     CR 0.37 (L) 06/29/2017     CR 0.37 (L) 06/26/2017     CR 0.34 (L) 06/22/2017     GLC 87 06/29/2017     GLC 89 06/26/2017     GLC 94 06/22/2017     TROPI 0.022 01/17/2017     TROPI 0.195 (HH) 01/13/2017     TROPI 0.283 (HH) 01/13/2017     AST 18 06/26/2017     AST 24 06/19/2017     AST 25 06/15/2017     ALT 26 06/26/2017     ALT 25 06/19/2017     ALT 37 06/15/2017     ALKPHOS 322 (H) 06/26/2017     ALKPHOS 361 (H) 06/19/2017     ALKPHOS 423 (H) 06/15/2017     BILITOTAL 0.7 06/26/2017     BILITOTAL 0.5 06/19/2017     BILITOTAL 0.8 06/15/2017     INR 1.12 06/26/2017     INR 1.08 06/19/2017     INR 1.06 06/12/2017                           Johny Frank

## 2017-06-30 NOTE — PLAN OF CARE
Problem: Goal Outcome Summary  Goal: Goal Outcome Summary  PT eval scheduled for this afternoon. Pt declined PT/OT services. She states she is IND in room and does not need any therapies.      Taken off schedule for the weekend.

## 2017-06-30 NOTE — PLAN OF CARE
Problem: Goal Outcome Summary  Goal: Goal Outcome Summary  Outcome: No Change  Alert and oriented x 4, has suction tube on L neck area, PICC line on R arm, J tube, pt was c/o R shoulder pain gave oxycodone x 2, TF is stopped at 2200, independent in room, continent B&B, TPN is running. Suction tube dressing was coming off applied dressing, pt doesn't like tube stabilizer.

## 2017-06-30 NOTE — PLAN OF CARE
Problem: Goal Outcome Summary  Goal: Goal Outcome Summary  Outcome: No Change  Patient up on the comode independently most of the time,she does not seem to have much of loose stool as she says.Stool specimen collected  & send.Both TPN and TF are cycled,TPN runs from 2000 for 14 hours and TF runs from 6 am to 10 pm,feed did not start until 7.30 am this morning.PICC in her right arm both ports saline flushed & heparin locked.TF infusing  Via  J-tube and all her medications are given via J-tube too except the topicals.

## 2017-07-01 LAB
GLUCOSE BLDC GLUCOMTR-MCNC: 103 MG/DL (ref 70–99)
GLUCOSE BLDC GLUCOMTR-MCNC: 107 MG/DL (ref 70–99)
GLUCOSE BLDC GLUCOMTR-MCNC: 80 MG/DL (ref 70–99)
GLUCOSE BLDC GLUCOMTR-MCNC: 99 MG/DL (ref 70–99)

## 2017-07-01 PROCEDURE — 25000132 ZZH RX MED GY IP 250 OP 250 PS 637: Mod: GY | Performed by: INTERNAL MEDICINE

## 2017-07-01 PROCEDURE — 25000125 ZZHC RX 250: Performed by: INTERNAL MEDICINE

## 2017-07-01 PROCEDURE — A9270 NON-COVERED ITEM OR SERVICE: HCPCS | Mod: GY | Performed by: PHYSICIAN ASSISTANT

## 2017-07-01 PROCEDURE — 25000132 ZZH RX MED GY IP 250 OP 250 PS 637: Mod: GY | Performed by: PHYSICIAN ASSISTANT

## 2017-07-01 PROCEDURE — A9270 NON-COVERED ITEM OR SERVICE: HCPCS | Mod: GY | Performed by: INTERNAL MEDICINE

## 2017-07-01 PROCEDURE — 27210995 ZZH RX 272

## 2017-07-01 PROCEDURE — 12000022 ZZH R&B SNF

## 2017-07-01 PROCEDURE — 25000128 H RX IP 250 OP 636: Performed by: INTERNAL MEDICINE

## 2017-07-01 PROCEDURE — 25000132 ZZH RX MED GY IP 250 OP 250 PS 637: Mod: GY | Performed by: NURSE PRACTITIONER

## 2017-07-01 PROCEDURE — 00000146 ZZHCL STATISTIC GLUCOSE BY METER IP

## 2017-07-01 PROCEDURE — A9270 NON-COVERED ITEM OR SERVICE: HCPCS | Mod: GY | Performed by: NURSE PRACTITIONER

## 2017-07-01 PROCEDURE — 25000125 ZZHC RX 250: Performed by: NURSE PRACTITIONER

## 2017-07-01 RX ORDER — TRAZODONE HYDROCHLORIDE 50 MG/1
100 TABLET, FILM COATED ORAL AT BEDTIME
Status: DISCONTINUED | OUTPATIENT
Start: 2017-07-01 | End: 2017-07-01

## 2017-07-01 RX ORDER — MORPHINE 10 MG/ML
6 TINCTURE ORAL EVERY 6 HOURS PRN
Status: DISCONTINUED | OUTPATIENT
Start: 2017-07-01 | End: 2017-07-07 | Stop reason: HOSPADM

## 2017-07-01 RX ORDER — CHLORHEXIDINE GLUCONATE ORAL RINSE 1.2 MG/ML
15 SOLUTION DENTAL 3 TIMES DAILY
Status: DISCONTINUED | OUTPATIENT
Start: 2017-07-01 | End: 2017-07-07 | Stop reason: HOSPADM

## 2017-07-01 RX ORDER — TRAZODONE HYDROCHLORIDE 50 MG/1
100 TABLET, FILM COATED ORAL AT BEDTIME
Status: DISCONTINUED | OUTPATIENT
Start: 2017-07-01 | End: 2017-07-07 | Stop reason: HOSPADM

## 2017-07-01 RX ORDER — MAGNESIUM HYDROXIDE 1200 MG/15ML
LIQUID ORAL
Status: COMPLETED
Start: 2017-07-01 | End: 2017-07-01

## 2017-07-01 RX ADMIN — GABAPENTIN 300 MG: 250 SOLUTION ORAL at 06:28

## 2017-07-01 RX ADMIN — Medication 10 ML: at 12:56

## 2017-07-01 RX ADMIN — Medication 10 ML: at 17:27

## 2017-07-01 RX ADMIN — ANTISEPTIC SURGICAL SCRUB: 0.04 SOLUTION TOPICAL at 08:33

## 2017-07-01 RX ADMIN — GABAPENTIN 300 MG: 250 SOLUTION ORAL at 21:18

## 2017-07-01 RX ADMIN — Medication 6 MG: at 20:50

## 2017-07-01 RX ADMIN — Medication 6 MG: at 12:56

## 2017-07-01 RX ADMIN — OXYCODONE HYDROCHLORIDE 15 MG: 5 SOLUTION ORAL at 10:18

## 2017-07-01 RX ADMIN — CHLORHEXIDINE GLUCONATE 15 ML: 1.2 RINSE ORAL at 13:12

## 2017-07-01 RX ADMIN — METOPROLOL TARTRATE 25 MG: 100 TABLET, FILM COATED ORAL at 21:18

## 2017-07-01 RX ADMIN — TRAZODONE HYDROCHLORIDE 100 MG: 50 TABLET ORAL at 21:19

## 2017-07-01 RX ADMIN — LOPERAMIDE HYDROCHLORIDE 4 MG: 1 SOLUTION ORAL at 17:28

## 2017-07-01 RX ADMIN — ACETAMINOPHEN 975 MG: 160 SUSPENSION ORAL at 08:31

## 2017-07-01 RX ADMIN — ACETAMINOPHEN 975 MG: 160 SUSPENSION ORAL at 13:12

## 2017-07-01 RX ADMIN — SIMETHICONE 40 MG: 20 SUSPENSION/ DROPS ORAL at 20:56

## 2017-07-01 RX ADMIN — LOPERAMIDE HYDROCHLORIDE 4 MG: 1 SOLUTION ORAL at 20:52

## 2017-07-01 RX ADMIN — Medication 10 ML: at 08:42

## 2017-07-01 RX ADMIN — DIPHENHYDRAMINE HYDROCHLORIDE 25 MG: 25 CAPSULE ORAL at 21:19

## 2017-07-01 RX ADMIN — HYDROCORTISONE: 10 CREAM TOPICAL at 21:28

## 2017-07-01 RX ADMIN — SIMETHICONE 40 MG: 20 SUSPENSION/ DROPS ORAL at 12:56

## 2017-07-01 RX ADMIN — Medication 2 PACKET: at 12:56

## 2017-07-01 RX ADMIN — METOPROLOL TARTRATE 25 MG: 100 TABLET, FILM COATED ORAL at 08:32

## 2017-07-01 RX ADMIN — LOPERAMIDE HYDROCHLORIDE 4 MG: 1 SOLUTION ORAL at 08:32

## 2017-07-01 RX ADMIN — POTASSIUM CHLORIDE: 2 INJECTION, SOLUTION, CONCENTRATE INTRAVENOUS at 20:45

## 2017-07-01 RX ADMIN — OXYCODONE HYDROCHLORIDE 10 MG: 5 SOLUTION ORAL at 03:25

## 2017-07-01 RX ADMIN — LIDOCAINE 2 PATCH: 50 PATCH CUTANEOUS at 17:30

## 2017-07-01 RX ADMIN — SODIUM CHLORIDE, PRESERVATIVE FREE 5 ML: 5 INJECTION INTRAVENOUS at 14:40

## 2017-07-01 RX ADMIN — CHLORHEXIDINE GLUCONATE 15 ML: 1.2 RINSE ORAL at 20:56

## 2017-07-01 RX ADMIN — CHLORHEXIDINE GLUCONATE 15 ML: 1.2 RINSE ORAL at 08:31

## 2017-07-01 RX ADMIN — PANTOPRAZOLE SODIUM 40 MG: 40 TABLET, DELAYED RELEASE ORAL at 13:14

## 2017-07-01 RX ADMIN — MINERAL SUPPLEMENT IRON 300 MG / 5 ML STRENGTH LIQUID 100 PER BOX UNFLAVORED 300 MG: at 08:32

## 2017-07-01 RX ADMIN — OXYCODONE HYDROCHLORIDE 15 MG: 5 SOLUTION ORAL at 20:51

## 2017-07-01 RX ADMIN — OXYCODONE HYDROCHLORIDE 15 MG: 5 SOLUTION ORAL at 00:04

## 2017-07-01 RX ADMIN — SIMETHICONE 40 MG: 20 SUSPENSION/ DROPS ORAL at 08:35

## 2017-07-01 RX ADMIN — OXYCODONE HYDROCHLORIDE 10 MG: 5 SOLUTION ORAL at 06:30

## 2017-07-01 RX ADMIN — OXYCODONE HYDROCHLORIDE 10 MG: 5 SOLUTION ORAL at 13:20

## 2017-07-01 RX ADMIN — Medication 250 MG: at 08:34

## 2017-07-01 RX ADMIN — Medication 2 PACKET: at 17:31

## 2017-07-01 RX ADMIN — HYDROCORTISONE: 10 CREAM TOPICAL at 08:36

## 2017-07-01 RX ADMIN — ACETAMINOPHEN 975 MG: 160 SUSPENSION ORAL at 20:54

## 2017-07-01 RX ADMIN — Medication 2 PACKET: at 08:35

## 2017-07-01 RX ADMIN — Medication 250 MG: at 21:22

## 2017-07-01 RX ADMIN — SODIUM CHLORIDE: 900 IRRIGANT IRRIGATION at 08:01

## 2017-07-01 RX ADMIN — OXYCODONE HYDROCHLORIDE 10 MG: 5 SOLUTION ORAL at 17:29

## 2017-07-01 RX ADMIN — ENOXAPARIN SODIUM 40 MG: 40 INJECTION SUBCUTANEOUS at 13:10

## 2017-07-01 RX ADMIN — LOPERAMIDE HYDROCHLORIDE 4 MG: 1 SOLUTION ORAL at 12:56

## 2017-07-01 RX ADMIN — GABAPENTIN 300 MG: 250 SOLUTION ORAL at 13:21

## 2017-07-01 RX ADMIN — CHOLESTYRAMINE 4 G: 4 POWDER, FOR SUSPENSION ORAL at 08:34

## 2017-07-01 RX ADMIN — Medication 10 ML: at 20:52

## 2017-07-01 NOTE — PROGRESS NOTES
Pt concerned about gurgling feeling she gets in her chest after medication placed into j tube. She states she has not had this before. I relayed this to the Dr on the floor and nurse practitioner. They wanted me to reassure her that they are not concerned about it but would let the thoracic team know tomorrow. Medicated several times for pain with much relief. Up to commode independently and ambulated in hallway with . Has had several loose stools this shift and she did talk to the Dr about this.

## 2017-07-01 NOTE — PHARMACY-MEDICATION REGIMEN REVIEW
Pharmacy Medication Regimen Review  Minerva Blanco is a 72 year old female who is currently in the Transitional Care Unit.    Assessment: All medications have an appropriate indications, durations and no unnecessary use was found    Plan:   Continue current therapy.   Pharmacy and dietary are co-managing TPN with lipids.   Please be aware that the following medications are with fall risks: PRN diphenhydramine, diphenoxylate-atropine, fentanyl patch, gabapentin, loperamide, metoprolol, PRN opium tincture, PRN oxycodone and trazodone.    Attending provider will be sent this note for review.  If there are any emergent issues noted above, pharmacist will contact provider directly by phone.      Pharmacy will periodically review the resident's medication regimen for any PRN medications not administered in > 72 hours and discontinue them. The pharmacist will discuss gradual dose reductions of psychopharmacologic medications with interdisciplinary team on a regular basis.    Please contact pharmacy if the above does not answer specific medication questions/concerns.    Background:  A pharmacist has reviewed all medications and pertinent medical history today.  Medications were reviewed for appropriate use and any irregularities found are listed with recommendations.      Current Facility-Administered Medications:      opium tincture 10 MG/ML (1%) liquid 6 mg, 6 mg, Per J Tube, Q6H PRN, Eliane Turner PA, 6 mg at 07/01/17 1256     traZODone (DESYREL) tablet 100 mg, 100 mg, Per J Tube, At Bedtime, Eliane Turner PA     acetaminophen (TYLENOL) solution 975 mg, 975 mg, Per Feeding Tube, TID, Miroslava Santa MD, 975 mg at 07/01/17 1312     chlorhexidine (PERIDEX) 0.12 % solution 15 mL, 15 mL, Swish & Spit, TID, Miroslava Santa MD, 15 mL at 07/01/17 1312     chlorhexidine (HIBICLENS) 4 % liquid, , Topical, BID, Miroslava Santa MD     metoprolol (LOPRESSOR) suspension 25 mg, 25 mg, Per J Tube, BID,  Miroslava Santa MD     parenteral nutrition - ADULT compounded formula CYCLE, , CENTRAL LINE IV, CYCLE *use admin instructions if needed, Miroslvaa Santa MD     lipids (INTRALIPID) 20 % infusion 180 mL, 180 mL, Intravenous, Once per day on Mon Tue Wed Thu Fri, Miroslava Santa MD, Last Rate: 15 mL/hr at 06/30/17 2009     dextrose 10 % 1,000 mL infusion, , Intravenous, Continuous PRN, Miroslava Santa MD     parenteral nutrition - ADULT compounded formula CYCLE, , CENTRAL LINE IV, CYCLE *use admin instructions if needed, Miroslava Santa MD, Last Rate: 110 mL/hr at 06/30/17 2108     cholestyramine (QUESTRAN) Packet 4 g, 1 packet, Oral, BID, Miroslava Santa MD, 4 g at 07/01/17 0834     enoxaparin (LOVENOX) injection 40 mg, 40 mg, Subcutaneous, Q24H, Miroslava Santa MD, 40 mg at 07/01/17 1310     ferrous sulfate 300 (60 FE) MG/5ML syrup 300 mg, 300 mg, Per J Tube, Daily, Miroslava Santa MD, 300 mg at 07/01/17 0832     gabapentin (NEURONTIN) solution 300 mg, 300 mg, Per Feeding Tube, Q8H, Miroslava Santa MD, 300 mg at 07/01/17 1321     loperamide (IMODIUM) liquid 4 mg, 4 mg, Per Feeding Tube, 4x Daily, Miroslava Santa MD, 4 mg at 07/01/17 1256     diphenhydrAMINE (BENADRYL) capsule 25 mg, 25 mg, Oral, Q6H PRN, France Shen APRN CNP, 25 mg at 06/29/17 2140     fiber modular (NUTRISOURCE FIBER) packet 2 packet, 2 packet, Per Feeding Tube, TID w/meals, France Shen APRN CNP, 2 packet at 07/01/17 1256     simethicone (MYLICON) suspension 40 mg, 40 mg, Per Feeding Tube, 4x Daily, Miroslava Santa MD, 40 mg at 07/01/17 1256     oxyCODONE (ROXICODONE) solution 10-20 mg, 10-20 mg, Per J Tube, Q3H PRN, Ac, CLIVE Anaya CNP, 10 mg at 07/01/17 1320     pantoprazole (PROTONIX) suspension 40 mg, 40 mg, Per Feeding Tube, Daily, Miroslava Santa MD, 40 mg at 07/01/17 1314     saccharomyces boulardii (FLORASTOR) capsule 250 mg, 250 mg, Per Feeding Tube, BID, Miroslava Santa MD, 250 mg at  07/01/17 0834     lidocaine (LIDODERM) 5 % Patch 1-2 patch, 1-2 patch, Transdermal, Q24H, France Shen APRN CNP, 2 patch at 06/30/17 1702     lidocaine (LIDODERM) patch REMOVAL, , Transdermal, Q24h, France Shen APRN CNP     lidocaine (LIDODERM) patch in PLACE, , Transdermal, Q8H, France Shen APRN CNP     hydrocortisone (CORTAID) 1 % cream, , Topical, BID, France Shen APRN CNP     fentaNYL (DURAGESIC) Patch in Place, , Transdermal, Q8H, France Shen APRN CNP     fentaNYL (DURAGESIC) patch REMOVAL, , Transdermal, Q72H, France Shen APRN CNP     fentaNYL (DURAGESIC) 100 mcg/hr 72 hr patch 1 patch, 100 mcg, Transdermal, Q72H, France Shen APRN CNP, 1 patch at 06/29/17 1728     medication instructions, , Does not apply, Continuous PRN, France Shen APRN CNP     Patient is already receiving anticoagulation with heparin, enoxaparin (LOVENOX), warfarin (COUMADIN)  or other anticoagulant medication, , Does not apply, Continuous PRN, France Shen APRN CNP     tuberculin injection 5 Units, 5 Units, Intradermal, Q21 Days, France Shen APRN CNP, 5 Units at 06/30/17 1126     [START ON 7/2/2017] - Skin Test Reading -, , Does not apply, Q21 Days, France Shen APRN CNP     bisacodyl (DULCOLAX) Suppository 10 mg, 10 mg, Rectal, BID PRN, France Shen APRN CNP     lidocaine 1 % 1 mL, 1 mL, Other, Q1H PRN, France Shen APRN CNP     lidocaine (LMX4) kit, , Topical, Q1H PRN, France Shen APRN CNP     sodium chloride (PF) 0.9% PF flush 10-20 mL, 10-20 mL, Intracatheter, Q1H PRN, France Shen APRN CNP, 20 mL at 06/30/17 0552     sodium chloride (PF) 0.9% PF flush 10 mL, 10 mL, Intracatheter, Q7 Days, France Shen APRN CNP, 10 mL at 06/29/17 1737     sodium chloride (PF) 0.9% PF flush 10-20 mL, 10-20 mL, Intracatheter, Q1H PRN, France Shen APRN CNP     heparin lock flush 10 UNIT/ML injection 5-10 mL, 5-10 mL, Intracatheter, Q24H, France Shen APRN CNP, 5 mL at 06/30/17 1342     heparin lock flush 10 UNIT/ML injection 5-10 mL, 5-10 mL,  Intracatheter, Q1H PRN, France Shen APRN CNP     naloxone (NARCAN) injection 0.1-0.4 mg, 0.1-0.4 mg, Intravenous, Q2 Min PRN, Miroslava Santa MD     diphenoxylate-atropine (LOMOTIL) liquid 10 mL, 10 mL, Per Feeding Tube, 4x Daily, Miroslava Santa MD, 10 mL at 07/01/17 1256  No current outpatient prescriptions on file.   PMH: Anastomotic leak with mediastinal abscess, s/p mediastinal wound debridement with chest tube placement (5/19) with subsequent pharyngostomy tube 5/31, stent placement 6/4 with exchange 6/27 and multiple washouts (6/2, 6/4, 6/9, 6/14, 6/27); Severe malnutrition in the context of chronic illness; diarrhea 2/2 TFs with h/o IBS, s/p c-diff neg 5/27; atrial fibrillation; breast cancer s/p lumpectomy 2007, fibromyalgia, GERD, IBS, meniere's disease, MS, symptomatic hiatal hernia. S/p Toupet fundoplication 1/2016 c/b esophageal perforation with mediastinal phlegmon s/p initial proximal gastrectomy, distal esophagectomy, spit fistula creation, left thoracotomy with mediastinal phlegmon excision on 1/9/2017.

## 2017-07-01 NOTE — PLAN OF CARE
Problem: Goal Outcome Summary  Goal: Goal Outcome Summary  Outcome: No Change  TF infusing @ 35 cc/ hour and water flushes every 4 hours.Patient occasionally turns feed rate down and she needs to let RN know how long rate has been changed per NPFrance.Neck tube flushed with 20 cc saline & suction is on intermittent.Patient is independent with transfers.PRN pain medication every three hours per patient request & she likes 10 mg oxycodone.Mouth rinses scheduled.PRN Opium given x 1.

## 2017-07-02 LAB
ELASTASE PANC STL-MCNT: 163 UG/G
GLUCOSE BLDC GLUCOMTR-MCNC: 104 MG/DL (ref 70–99)
GLUCOSE BLDC GLUCOMTR-MCNC: 84 MG/DL (ref 70–99)
GLUCOSE BLDC GLUCOMTR-MCNC: 98 MG/DL (ref 70–99)

## 2017-07-02 PROCEDURE — A9270 NON-COVERED ITEM OR SERVICE: HCPCS | Mod: GY | Performed by: NURSE PRACTITIONER

## 2017-07-02 PROCEDURE — 00000146 ZZHCL STATISTIC GLUCOSE BY METER IP

## 2017-07-02 PROCEDURE — A9270 NON-COVERED ITEM OR SERVICE: HCPCS | Mod: GY | Performed by: INTERNAL MEDICINE

## 2017-07-02 PROCEDURE — 25000125 ZZHC RX 250: Performed by: INTERNAL MEDICINE

## 2017-07-02 PROCEDURE — 25000132 ZZH RX MED GY IP 250 OP 250 PS 637: Mod: GY | Performed by: PHYSICIAN ASSISTANT

## 2017-07-02 PROCEDURE — 25000132 ZZH RX MED GY IP 250 OP 250 PS 637: Mod: GY | Performed by: INTERNAL MEDICINE

## 2017-07-02 PROCEDURE — A9270 NON-COVERED ITEM OR SERVICE: HCPCS | Mod: GY | Performed by: PHYSICIAN ASSISTANT

## 2017-07-02 PROCEDURE — 25000125 ZZHC RX 250: Performed by: NURSE PRACTITIONER

## 2017-07-02 PROCEDURE — 93005 ELECTROCARDIOGRAM TRACING: CPT

## 2017-07-02 PROCEDURE — 25000128 H RX IP 250 OP 636: Performed by: INTERNAL MEDICINE

## 2017-07-02 PROCEDURE — 12000022 ZZH R&B SNF

## 2017-07-02 PROCEDURE — 25000132 ZZH RX MED GY IP 250 OP 250 PS 637: Mod: GY | Performed by: NURSE PRACTITIONER

## 2017-07-02 RX ADMIN — PANTOPRAZOLE SODIUM 40 MG: 40 TABLET, DELAYED RELEASE ORAL at 14:11

## 2017-07-02 RX ADMIN — MINERAL SUPPLEMENT IRON 300 MG / 5 ML STRENGTH LIQUID 100 PER BOX UNFLAVORED 300 MG: at 08:30

## 2017-07-02 RX ADMIN — Medication 2 PACKET: at 08:32

## 2017-07-02 RX ADMIN — Medication 10 ML: at 21:32

## 2017-07-02 RX ADMIN — OXYCODONE HYDROCHLORIDE 10 MG: 5 SOLUTION ORAL at 02:51

## 2017-07-02 RX ADMIN — GABAPENTIN 300 MG: 250 SOLUTION ORAL at 14:11

## 2017-07-02 RX ADMIN — LOPERAMIDE HYDROCHLORIDE 4 MG: 1 SOLUTION ORAL at 18:14

## 2017-07-02 RX ADMIN — Medication 10 ML: at 08:40

## 2017-07-02 RX ADMIN — OXYCODONE HYDROCHLORIDE 15 MG: 5 SOLUTION ORAL at 21:32

## 2017-07-02 RX ADMIN — ENOXAPARIN SODIUM 40 MG: 40 INJECTION SUBCUTANEOUS at 11:06

## 2017-07-02 RX ADMIN — SODIUM CHLORIDE, PRESERVATIVE FREE 5 ML: 5 INJECTION INTRAVENOUS at 14:10

## 2017-07-02 RX ADMIN — ACETAMINOPHEN 975 MG: 160 SUSPENSION ORAL at 14:09

## 2017-07-02 RX ADMIN — SIMETHICONE 40 MG: 20 SUSPENSION/ DROPS ORAL at 21:33

## 2017-07-02 RX ADMIN — Medication 250 MG: at 08:29

## 2017-07-02 RX ADMIN — CHLORHEXIDINE GLUCONATE 15 ML: 1.2 RINSE ORAL at 08:42

## 2017-07-02 RX ADMIN — OXYCODONE HYDROCHLORIDE 15 MG: 5 SOLUTION ORAL at 10:02

## 2017-07-02 RX ADMIN — OXYCODONE HYDROCHLORIDE 10 MG: 5 SOLUTION ORAL at 14:09

## 2017-07-02 RX ADMIN — Medication 2 PACKET: at 14:08

## 2017-07-02 RX ADMIN — CHLORHEXIDINE GLUCONATE 15 ML: 1.2 RINSE ORAL at 14:09

## 2017-07-02 RX ADMIN — ACETAMINOPHEN 975 MG: 160 SUSPENSION ORAL at 22:09

## 2017-07-02 RX ADMIN — SIMETHICONE 40 MG: 20 SUSPENSION/ DROPS ORAL at 08:30

## 2017-07-02 RX ADMIN — Medication 6 MG: at 08:40

## 2017-07-02 RX ADMIN — Medication 250 MG: at 21:33

## 2017-07-02 RX ADMIN — OXYCODONE HYDROCHLORIDE 10 MG: 5 SOLUTION ORAL at 18:13

## 2017-07-02 RX ADMIN — SIMETHICONE 40 MG: 20 SUSPENSION/ DROPS ORAL at 14:11

## 2017-07-02 RX ADMIN — Medication 2 PACKET: at 18:16

## 2017-07-02 RX ADMIN — CHLORHEXIDINE GLUCONATE 15 ML: 1.2 RINSE ORAL at 21:33

## 2017-07-02 RX ADMIN — CHOLESTYRAMINE 4 G: 4 POWDER, FOR SUSPENSION ORAL at 08:31

## 2017-07-02 RX ADMIN — METOPROLOL TARTRATE 25 MG: 100 TABLET, FILM COATED ORAL at 08:29

## 2017-07-02 RX ADMIN — SIMETHICONE 40 MG: 20 SUSPENSION/ DROPS ORAL at 18:14

## 2017-07-02 RX ADMIN — ACETAMINOPHEN 975 MG: 160 SUSPENSION ORAL at 08:30

## 2017-07-02 RX ADMIN — GABAPENTIN 300 MG: 250 SOLUTION ORAL at 06:28

## 2017-07-02 RX ADMIN — LOPERAMIDE HYDROCHLORIDE 4 MG: 1 SOLUTION ORAL at 14:10

## 2017-07-02 RX ADMIN — GABAPENTIN 300 MG: 250 SOLUTION ORAL at 21:32

## 2017-07-02 RX ADMIN — Medication 10 ML: at 18:14

## 2017-07-02 RX ADMIN — OXYCODONE HYDROCHLORIDE 10 MG: 5 SOLUTION ORAL at 06:27

## 2017-07-02 RX ADMIN — LOPERAMIDE HYDROCHLORIDE 4 MG: 1 SOLUTION ORAL at 08:31

## 2017-07-02 RX ADMIN — DIPHENHYDRAMINE HYDROCHLORIDE 25 MG: 25 CAPSULE ORAL at 21:34

## 2017-07-02 RX ADMIN — TRAZODONE HYDROCHLORIDE 100 MG: 50 TABLET ORAL at 21:34

## 2017-07-02 RX ADMIN — FENTANYL 1 PATCH: 100 PATCH, EXTENDED RELEASE TRANSDERMAL at 16:37

## 2017-07-02 RX ADMIN — METOPROLOL TARTRATE 25 MG: 100 TABLET, FILM COATED ORAL at 21:38

## 2017-07-02 RX ADMIN — LOPERAMIDE HYDROCHLORIDE 4 MG: 1 SOLUTION ORAL at 21:43

## 2017-07-02 RX ADMIN — LIDOCAINE 2 PATCH: 50 PATCH CUTANEOUS at 18:22

## 2017-07-02 RX ADMIN — POTASSIUM CHLORIDE: 2 INJECTION, SOLUTION, CONCENTRATE INTRAVENOUS at 20:47

## 2017-07-02 RX ADMIN — Medication 10 ML: at 14:09

## 2017-07-02 NOTE — PLAN OF CARE
"Problem: Goal Outcome Summary  Goal: Goal Outcome Summary  Outcome: No Change  RN: Pt was frustrated of acucheck at midnight. She requested not to be woken up again unless she calls so she can have a good sleep. BG q 6H was ordered x 3 days r/t Parenteral nutrition per protocol. Was started on 6/30 and results have been in acceptable range. BG at midnight was 98. TPN infusing on 2nd cycle at 110 ml/hr via PICC. No Lipids tonight. Neck pain comfortably managed with Roxicodone 10 mg sol via J-tube x 1 so far this shift. L neck with pharyngostomy tube intact and attached to low intermittent suction. Pt got anxious when this nurse was checking the tube and suction. Stated \" Don't touch anything there, I mean seriously, don't touch anything\". Reassured her that this nurse was just checking to make sure the setting is correct and has no further bloody drainage coming out. See evening nurse's notes. Drainage has been green so far tonight. Up independently to BSC, calls staff to empty. Continue to have watery stool but lesser frequency. See I&O.  Will start TF at 0600 per order. Pt agreeable. Appear to be sleeping/resting between cares. Using call light appropriately. Continue with plan of care.    0630: TF started at 25 ml/hr x 1 hour via J-tube per pt request. Was initially hesitant to have any flushes programmed in the pump but eventually agreed to try 15 ml q 4 hr and claimed that if she can tolerate that, then nurse can increase it to 30 ml q4H. No Bg taken at 0600 as this makes pt also upset d/t being non diabetic. AM nurse aware. Tolerated 30 cc of NS flushing to pharyngostomy tube. Drainage green, no blood noted. TF increased to 35 ml/hr at 0730. Continue to Monitor tolerance.      "

## 2017-07-02 NOTE — PLAN OF CARE
Problem: Goal Outcome Summary  Goal: Goal Outcome Summary  Outcome: No Change  Patient has been okay,needing pain medication when due prn every three hours,complains of stomach pain.Thoracic tube flushed with 30 cc normal saline,no bleeding @ site,suction off,patient left with  to go outside.TF infusing @ 35 cc/hour,patient manages how much water to flush with and how often & she forgets to let the nurse know.Independent with comode,briefs are occasionally soiled with BM,less diarrhea noted .

## 2017-07-02 NOTE — PLAN OF CARE
Problem: Goal Outcome Summary  Goal: Goal Outcome Summary  Outcome: No Change  Alert and oriented x 4, c/o pain gave oxycodone x 2, changed chest dressing moderate amount greenish yellow drainage noted, small blood noted on suction tube, called MD RAUD came and said to stop the suction if there is blood for 1hr. TF stopped by the pt at 2200.

## 2017-07-03 LAB
ANION GAP SERPL CALCULATED.3IONS-SCNC: 8 MMOL/L (ref 3–14)
BUN SERPL-MCNC: 25 MG/DL (ref 7–30)
CALCIUM SERPL-MCNC: 8.1 MG/DL (ref 8.5–10.1)
CHLORIDE SERPL-SCNC: 106 MMOL/L (ref 94–109)
CO2 SERPL-SCNC: 26 MMOL/L (ref 20–32)
CREAT SERPL-MCNC: 0.35 MG/DL (ref 0.52–1.04)
ERYTHROCYTE [DISTWIDTH] IN BLOOD BY AUTOMATED COUNT: 15.3 % (ref 10–15)
GFR SERPL CREATININE-BSD FRML MDRD: ABNORMAL ML/MIN/1.7M2
GLUCOSE BLDC GLUCOMTR-MCNC: 92 MG/DL (ref 70–99)
GLUCOSE BLDC GLUCOMTR-MCNC: 94 MG/DL (ref 70–99)
GLUCOSE SERPL-MCNC: 102 MG/DL (ref 70–99)
HCT VFR BLD AUTO: 29.9 % (ref 35–47)
HGB BLD-MCNC: 9 G/DL (ref 11.7–15.7)
INR PPP: 1.09 (ref 0.86–1.14)
MAGNESIUM SERPL-MCNC: 2 MG/DL (ref 1.6–2.3)
MCH RBC QN AUTO: 28.9 PG (ref 26.5–33)
MCHC RBC AUTO-ENTMCNC: 30.1 G/DL (ref 31.5–36.5)
MCV RBC AUTO: 96 FL (ref 78–100)
PHOSPHATE SERPL-MCNC: 3.8 MG/DL (ref 2.5–4.5)
PLATELET # BLD AUTO: 307 10E9/L (ref 150–450)
POTASSIUM SERPL-SCNC: 4.2 MMOL/L (ref 3.4–5.3)
PREALB SERPL IA-MCNC: 19 MG/DL (ref 15–45)
RBC # BLD AUTO: 3.11 10E12/L (ref 3.8–5.2)
SODIUM SERPL-SCNC: 140 MMOL/L (ref 133–144)
TRIGL SERPL-MCNC: 51 MG/DL
WBC # BLD AUTO: 5.2 10E9/L (ref 4–11)

## 2017-07-03 PROCEDURE — A9270 NON-COVERED ITEM OR SERVICE: HCPCS | Mod: GY | Performed by: NURSE PRACTITIONER

## 2017-07-03 PROCEDURE — A9270 NON-COVERED ITEM OR SERVICE: HCPCS | Mod: GY | Performed by: PHYSICIAN ASSISTANT

## 2017-07-03 PROCEDURE — 84134 ASSAY OF PREALBUMIN: CPT | Performed by: INTERNAL MEDICINE

## 2017-07-03 PROCEDURE — 84100 ASSAY OF PHOSPHORUS: CPT | Performed by: INTERNAL MEDICINE

## 2017-07-03 PROCEDURE — 85610 PROTHROMBIN TIME: CPT | Performed by: INTERNAL MEDICINE

## 2017-07-03 PROCEDURE — 25000125 ZZHC RX 250: Performed by: INTERNAL MEDICINE

## 2017-07-03 PROCEDURE — 80048 BASIC METABOLIC PNL TOTAL CA: CPT | Performed by: INTERNAL MEDICINE

## 2017-07-03 PROCEDURE — 25000125 ZZHC RX 250: Performed by: NURSE PRACTITIONER

## 2017-07-03 PROCEDURE — 00000146 ZZHCL STATISTIC GLUCOSE BY METER IP

## 2017-07-03 PROCEDURE — 83735 ASSAY OF MAGNESIUM: CPT | Performed by: INTERNAL MEDICINE

## 2017-07-03 PROCEDURE — 84478 ASSAY OF TRIGLYCERIDES: CPT | Performed by: INTERNAL MEDICINE

## 2017-07-03 PROCEDURE — 25000132 ZZH RX MED GY IP 250 OP 250 PS 637: Mod: GY | Performed by: INTERNAL MEDICINE

## 2017-07-03 PROCEDURE — 85027 COMPLETE CBC AUTOMATED: CPT | Performed by: INTERNAL MEDICINE

## 2017-07-03 PROCEDURE — 25000128 H RX IP 250 OP 636: Performed by: INTERNAL MEDICINE

## 2017-07-03 PROCEDURE — A9270 NON-COVERED ITEM OR SERVICE: HCPCS | Mod: GY | Performed by: INTERNAL MEDICINE

## 2017-07-03 PROCEDURE — 25000132 ZZH RX MED GY IP 250 OP 250 PS 637: Mod: GY | Performed by: NURSE PRACTITIONER

## 2017-07-03 PROCEDURE — 25000132 ZZH RX MED GY IP 250 OP 250 PS 637: Mod: GY | Performed by: PHYSICIAN ASSISTANT

## 2017-07-03 PROCEDURE — 12000022 ZZH R&B SNF

## 2017-07-03 PROCEDURE — 36592 COLLECT BLOOD FROM PICC: CPT | Performed by: INTERNAL MEDICINE

## 2017-07-03 RX ORDER — CYCLOBENZAPRINE HCL 5 MG
10 TABLET ORAL DAILY PRN
Status: DISCONTINUED | OUTPATIENT
Start: 2017-07-03 | End: 2017-07-07 | Stop reason: HOSPADM

## 2017-07-03 RX ADMIN — Medication 10 ML: at 13:01

## 2017-07-03 RX ADMIN — CYCLOBENZAPRINE HYDROCHLORIDE 10 MG: 5 TABLET, FILM COATED ORAL at 11:37

## 2017-07-03 RX ADMIN — Medication 10 ML: at 10:02

## 2017-07-03 RX ADMIN — POTASSIUM CHLORIDE: 2 INJECTION, SOLUTION, CONCENTRATE INTRAVENOUS at 20:19

## 2017-07-03 RX ADMIN — Medication 10 ML: at 16:37

## 2017-07-03 RX ADMIN — LOPERAMIDE HYDROCHLORIDE 4 MG: 1 SOLUTION ORAL at 14:18

## 2017-07-03 RX ADMIN — CHLORHEXIDINE GLUCONATE 15 ML: 1.2 RINSE ORAL at 19:19

## 2017-07-03 RX ADMIN — Medication 250 MG: at 21:53

## 2017-07-03 RX ADMIN — Medication 10 ML: at 21:52

## 2017-07-03 RX ADMIN — ACETAMINOPHEN 975 MG: 160 SUSPENSION ORAL at 19:19

## 2017-07-03 RX ADMIN — CHLORHEXIDINE GLUCONATE 15 ML: 1.2 RINSE ORAL at 14:18

## 2017-07-03 RX ADMIN — SIMETHICONE 40 MG: 20 SUSPENSION/ DROPS ORAL at 21:52

## 2017-07-03 RX ADMIN — TRAZODONE HYDROCHLORIDE 100 MG: 50 TABLET ORAL at 21:53

## 2017-07-03 RX ADMIN — LOPERAMIDE HYDROCHLORIDE 4 MG: 1 SOLUTION ORAL at 10:03

## 2017-07-03 RX ADMIN — Medication 2 PACKET: at 10:05

## 2017-07-03 RX ADMIN — LOPERAMIDE HYDROCHLORIDE 4 MG: 1 SOLUTION ORAL at 16:37

## 2017-07-03 RX ADMIN — SODIUM CHLORIDE, PRESERVATIVE FREE 5 ML: 5 INJECTION INTRAVENOUS at 07:01

## 2017-07-03 RX ADMIN — Medication 250 MG: at 11:37

## 2017-07-03 RX ADMIN — ACETAMINOPHEN 970 MG: 160 SUSPENSION ORAL at 09:59

## 2017-07-03 RX ADMIN — ENOXAPARIN SODIUM 40 MG: 40 INJECTION SUBCUTANEOUS at 11:37

## 2017-07-03 RX ADMIN — SODIUM CHLORIDE, PRESERVATIVE FREE 5 ML: 5 INJECTION INTRAVENOUS at 10:17

## 2017-07-03 RX ADMIN — MINERAL SUPPLEMENT IRON 300 MG / 5 ML STRENGTH LIQUID 100 PER BOX UNFLAVORED 300 MG: at 10:02

## 2017-07-03 RX ADMIN — Medication 6 MG: at 22:03

## 2017-07-03 RX ADMIN — GABAPENTIN 300 MG: 250 SOLUTION ORAL at 14:18

## 2017-07-03 RX ADMIN — OXYCODONE HYDROCHLORIDE 15 MG: 5 SOLUTION ORAL at 21:52

## 2017-07-03 RX ADMIN — SIMETHICONE 40 MG: 20 SUSPENSION/ DROPS ORAL at 13:01

## 2017-07-03 RX ADMIN — LIDOCAINE 2 PATCH: 50 PATCH CUTANEOUS at 16:35

## 2017-07-03 RX ADMIN — GABAPENTIN 300 MG: 250 SOLUTION ORAL at 21:52

## 2017-07-03 RX ADMIN — GABAPENTIN 300 MG: 250 SOLUTION ORAL at 05:59

## 2017-07-03 RX ADMIN — DIPHENHYDRAMINE HYDROCHLORIDE 25 MG: 25 CAPSULE ORAL at 22:03

## 2017-07-03 RX ADMIN — OXYCODONE HYDROCHLORIDE 10 MG: 5 SOLUTION ORAL at 01:22

## 2017-07-03 RX ADMIN — OXYCODONE HYDROCHLORIDE 15 MG: 5 SOLUTION ORAL at 05:55

## 2017-07-03 RX ADMIN — I.V. FAT EMULSION 180 ML: 20 EMULSION INTRAVENOUS at 20:19

## 2017-07-03 RX ADMIN — Medication 2 PACKET: at 14:21

## 2017-07-03 RX ADMIN — METOPROLOL TARTRATE 25 MG: 100 TABLET, FILM COATED ORAL at 21:53

## 2017-07-03 RX ADMIN — SIMETHICONE 40 MG: 20 SUSPENSION/ DROPS ORAL at 16:37

## 2017-07-03 RX ADMIN — Medication 2 PACKET: at 19:19

## 2017-07-03 RX ADMIN — Medication 6 MG: at 01:22

## 2017-07-03 RX ADMIN — LOPERAMIDE HYDROCHLORIDE 4 MG: 1 SOLUTION ORAL at 21:52

## 2017-07-03 RX ADMIN — ACETAMINOPHEN 975 MG: 160 SUSPENSION ORAL at 14:18

## 2017-07-03 RX ADMIN — METOPROLOL TARTRATE 25 MG: 100 TABLET, FILM COATED ORAL at 10:04

## 2017-07-03 RX ADMIN — OXYCODONE HYDROCHLORIDE 10 MG: 5 SOLUTION ORAL at 16:44

## 2017-07-03 RX ADMIN — OXYCODONE HYDROCHLORIDE 10 MG: 5 SOLUTION ORAL at 09:59

## 2017-07-03 RX ADMIN — CHOLESTYRAMINE 4 G: 4 POWDER, FOR SUSPENSION ORAL at 11:36

## 2017-07-03 RX ADMIN — SIMETHICONE 40 MG: 20 SUSPENSION/ DROPS ORAL at 10:03

## 2017-07-03 RX ADMIN — PANTOPRAZOLE SODIUM 40 MG: 40 TABLET, DELAYED RELEASE ORAL at 14:44

## 2017-07-03 RX ADMIN — CHLORHEXIDINE GLUCONATE 15 ML: 1.2 RINSE ORAL at 10:03

## 2017-07-03 RX ADMIN — CHOLESTYRAMINE 4 G: 4 POWDER, FOR SUSPENSION ORAL at 20:39

## 2017-07-03 RX ADMIN — OXYCODONE HYDROCHLORIDE 10 MG: 5 SOLUTION ORAL at 13:01

## 2017-07-03 ASSESSMENT — PAIN DESCRIPTION - DESCRIPTORS
DESCRIPTORS: ACHING;CRAMPING
DESCRIPTORS: ACHING

## 2017-07-03 NOTE — PROGRESS NOTES
TCU Care Coordinator Progress Note    Admission date: 6/29/2017     Data: Minerva Blanco is a 73 yo female on TCU for nutritional, wound care, medical, and nursing needs following hospitalization for complications of multiple upper GI/chest surgeries. She had evals done by TCU PT/OT, has no ongoing TCU therapy needs.    Intervention: Met with patient to introduce the role of Care Coordinator and to begin discussion of anticipated DC planning needs. Initiated referral with [facility] for enteral and TPN, also requesting [facility] for home care nursing, whom she has used before. Ordered [facility] for TPN teaching, in case she has needs for home TPN.    Assessment: Patient does have home enteral and TPN coverage, per [facility] benefit check. She has been on enteral with them for the past year and a half. She had home care nursing with [facility] prior to hospitalization, would luike to resume services with them as well. She will need TPN teaching. She has done home intermittent suction to similar drains in the past (currently with LIS to pharyngostomy) with [facility], willing to do home suction again. She expresses strong desire to go home by the end of the week, then return for her next surgical procedure on 7/17/2017. She understands this would need medical/chest team approval. She has simple wound care to the upper chest wound, would have no problems managing this at home. She is currently on Lovenox, unsure if this will continue, but has done SQ injections at home in the past.    Plan: Will continue to follow DC planning needs throughout TCU stay. Many variables contributing to DC planning disposition.    Markus Montilla RN, BSN, Patient Care Management Coordinator  [facility]  [address], [address] [place] [address]  [email or web address]  [email or web address]   Desk: [phone] TCU Main [phone] Fax [phone] Pager [phone]

## 2017-07-03 NOTE — PROGRESS NOTES
07/03/17 1500   General Information   Patient Profile Review See Profile for full history and prior level of function   Daily Contact with Relatives or Friends Phone call;Visit   Community Involvement   Community Involvement Retired   Drives Yes   Sees Hospital ? No   Outings Dinner;Shopping;Travel;Movies;Concerts;Theater   Music   Music Preferences Spiritual   Favorite Musicians/Group [employer] station   Media   Newspapers Daily   TV / Movies TV;Movie list   Reading Books   Sports / Physical Activities   Outdoor Activities Outdoor gardening   Sports Fan Football;Other (comments)  (hockey)   Impression   Open to Socializing with Others Independent   Barriers to Leisure Endurance;Pain   Patient, family and / or staff in agreement with Plan of Care Yes   Treatment Plan   Independent Activities enjoys family time   Structured Groups Relaxation/meditation   Type of Intervention 1:1 intervention   One to One Therapeutic Intervention Hand massage;Healing touch;TR 1:1;Volunteer   TR 1:1  Community resources   Equipment and Supplies While on Unit None needed   Assessment REC- assessment completed patient wiling to participate in group activity and did issue a movie list assessment completed

## 2017-07-03 NOTE — PLAN OF CARE
Problem: Goal Outcome Summary  Goal: Goal Outcome Summary  Outcome: No Change  Alert and oriented x 4, c/o pain gave oxycodone x 2, pt stopped TF around 2130, changed chest wound dressing, purulent moderate amount drainage noted, thoracic tube dressing intact at low intermittent suction,flushed with 30cc, changed J tube site dressing, PICC line dressing intact TPN is running at 110, diarrhea about x 5.

## 2017-07-03 NOTE — PLAN OF CARE
Problem: Goal Outcome Summary  Goal: Goal Outcome Summary  Outcome: No Change  Patient is alert and she directs her cares and she uses her call light appropriately. Patient was upset with me that I didn't bring in her am medications at 0800. Patient didn't put her call light on and ask for any prn pain medication this am. Patient likes her pain medications every 3 hours and she took 10 mg of the oxycodone. Patient complaining of left shoulder pain and I received a flexeril qd prn and she stated it did help. This left  shoulder pain is not new. Patient likes her medications and water flushes down her JT done slowly and spaced out. Patient is very particular how she wants her medications given down the feeding tube. Patient refused the 30 cc water flushes every 4 hours. Patient on and off the commode throughout the day, having loose stools. VSS.  and 94 this shift.

## 2017-07-03 NOTE — PLAN OF CARE
Problem: Goal Outcome Summary  Goal: Goal Outcome Summary  Outcome: No Change  RN: Pain comfortably managed with Roxicodone 10-15 mg sol via J-tube x 2 this shift. Had to use the higher dose upon waking up this am d/t increase R shoulder pain. Warm apcks also applied after removing Lidoderm patches. TPN infusing on 2nd cycle at 110 ml/hr via PICC without problem. No Lipid tonight. Up independently to BSC. Less frequently. TF started on time at 0600 as pt would like to be off on time as well r/t loose stool. Agreed to start it at goal rate of 35 ml/hr. Informed that SPD ran out of feeding bag with flush bag. Pt doesn't mind as she usually doesn't tolerate H20 flushes of 30 ml q 4H as ordered. AM nurse informed that she may need to manually flush the tube q 4H. Pharyngostomy tube intact connected to low intermittent suction. Tolerated 30 cc NS flushing at 0600. Green drainage noted. Appear to be sleeping/resting between meds/ cares. Using call light appropriately. Continue with plan of care.

## 2017-07-04 LAB — GLUCOSE BLDC GLUCOMTR-MCNC: 91 MG/DL (ref 70–99)

## 2017-07-04 PROCEDURE — A9270 NON-COVERED ITEM OR SERVICE: HCPCS | Mod: GY | Performed by: PHYSICIAN ASSISTANT

## 2017-07-04 PROCEDURE — 00000146 ZZHCL STATISTIC GLUCOSE BY METER IP

## 2017-07-04 PROCEDURE — A9270 NON-COVERED ITEM OR SERVICE: HCPCS | Mod: GY | Performed by: INTERNAL MEDICINE

## 2017-07-04 PROCEDURE — 25000132 ZZH RX MED GY IP 250 OP 250 PS 637: Mod: GY | Performed by: PHYSICIAN ASSISTANT

## 2017-07-04 PROCEDURE — 25000128 H RX IP 250 OP 636: Performed by: INTERNAL MEDICINE

## 2017-07-04 PROCEDURE — 99207 ZZC CDG-MDM COMPONENT: MEETS MODERATE - UP CODED: CPT | Performed by: PHYSICIAN ASSISTANT

## 2017-07-04 PROCEDURE — 99309 SBSQ NF CARE MODERATE MDM 30: CPT | Performed by: PHYSICIAN ASSISTANT

## 2017-07-04 PROCEDURE — 25000132 ZZH RX MED GY IP 250 OP 250 PS 637: Mod: GY | Performed by: INTERNAL MEDICINE

## 2017-07-04 PROCEDURE — 12000022 ZZH R&B SNF

## 2017-07-04 PROCEDURE — A9270 NON-COVERED ITEM OR SERVICE: HCPCS | Mod: GY | Performed by: NURSE PRACTITIONER

## 2017-07-04 PROCEDURE — 25000125 ZZHC RX 250: Performed by: INTERNAL MEDICINE

## 2017-07-04 PROCEDURE — 25000132 ZZH RX MED GY IP 250 OP 250 PS 637: Mod: GY | Performed by: NURSE PRACTITIONER

## 2017-07-04 PROCEDURE — 25000125 ZZHC RX 250: Performed by: NURSE PRACTITIONER

## 2017-07-04 RX ADMIN — LIDOCAINE 2 PATCH: 50 PATCH CUTANEOUS at 17:39

## 2017-07-04 RX ADMIN — DIPHENHYDRAMINE HYDROCHLORIDE 25 MG: 25 CAPSULE ORAL at 22:00

## 2017-07-04 RX ADMIN — Medication 10 ML: at 17:39

## 2017-07-04 RX ADMIN — Medication 2 PACKET: at 17:42

## 2017-07-04 RX ADMIN — MINERAL SUPPLEMENT IRON 300 MG / 5 ML STRENGTH LIQUID 100 PER BOX UNFLAVORED 300 MG: at 09:11

## 2017-07-04 RX ADMIN — POTASSIUM CHLORIDE: 2 INJECTION, SOLUTION, CONCENTRATE INTRAVENOUS at 20:32

## 2017-07-04 RX ADMIN — PANTOPRAZOLE SODIUM 40 MG: 40 TABLET, DELAYED RELEASE ORAL at 14:46

## 2017-07-04 RX ADMIN — SODIUM CHLORIDE, PRESERVATIVE FREE 5 ML: 5 INJECTION INTRAVENOUS at 10:25

## 2017-07-04 RX ADMIN — OXYCODONE HYDROCHLORIDE 10 MG: 5 SOLUTION ORAL at 13:19

## 2017-07-04 RX ADMIN — ENOXAPARIN SODIUM 40 MG: 40 INJECTION SUBCUTANEOUS at 13:14

## 2017-07-04 RX ADMIN — ACETAMINOPHEN 975 MG: 160 SUSPENSION ORAL at 09:06

## 2017-07-04 RX ADMIN — Medication 250 MG: at 09:12

## 2017-07-04 RX ADMIN — OXYCODONE HYDROCHLORIDE 10 MG: 5 SOLUTION ORAL at 00:43

## 2017-07-04 RX ADMIN — SODIUM CHLORIDE, PRESERVATIVE FREE 5 ML: 5 INJECTION INTRAVENOUS at 14:48

## 2017-07-04 RX ADMIN — Medication 10 ML: at 20:33

## 2017-07-04 RX ADMIN — CHLORHEXIDINE GLUCONATE 15 ML: 1.2 RINSE ORAL at 20:36

## 2017-07-04 RX ADMIN — SIMETHICONE 40 MG: 20 SUSPENSION/ DROPS ORAL at 09:13

## 2017-07-04 RX ADMIN — CYCLOBENZAPRINE HYDROCHLORIDE 10 MG: 5 TABLET, FILM COATED ORAL at 20:35

## 2017-07-04 RX ADMIN — Medication 2 PACKET: at 09:14

## 2017-07-04 RX ADMIN — ACETAMINOPHEN 975 MG: 160 SUSPENSION ORAL at 20:34

## 2017-07-04 RX ADMIN — CHLORHEXIDINE GLUCONATE 15 ML: 1.2 RINSE ORAL at 09:11

## 2017-07-04 RX ADMIN — Medication 6 MG: at 09:22

## 2017-07-04 RX ADMIN — SIMETHICONE 40 MG: 20 SUSPENSION/ DROPS ORAL at 20:38

## 2017-07-04 RX ADMIN — Medication 250 MG: at 20:36

## 2017-07-04 RX ADMIN — LOPERAMIDE HYDROCHLORIDE 4 MG: 1 SOLUTION ORAL at 17:41

## 2017-07-04 RX ADMIN — GABAPENTIN 300 MG: 250 SOLUTION ORAL at 05:00

## 2017-07-04 RX ADMIN — TRAZODONE HYDROCHLORIDE 100 MG: 50 TABLET ORAL at 22:00

## 2017-07-04 RX ADMIN — LOPERAMIDE HYDROCHLORIDE 4 MG: 1 SOLUTION ORAL at 09:12

## 2017-07-04 RX ADMIN — OXYCODONE HYDROCHLORIDE 10 MG: 5 SOLUTION ORAL at 09:06

## 2017-07-04 RX ADMIN — CHOLESTYRAMINE 4 G: 4 POWDER, FOR SUSPENSION ORAL at 09:11

## 2017-07-04 RX ADMIN — OXYCODONE HYDROCHLORIDE 15 MG: 5 SOLUTION ORAL at 04:57

## 2017-07-04 RX ADMIN — GABAPENTIN 300 MG: 250 SOLUTION ORAL at 14:46

## 2017-07-04 RX ADMIN — ACETAMINOPHEN 975 MG: 160 SUSPENSION ORAL at 13:15

## 2017-07-04 RX ADMIN — Medication 10 ML: at 13:16

## 2017-07-04 RX ADMIN — METOPROLOL TARTRATE 25 MG: 100 TABLET, FILM COATED ORAL at 09:13

## 2017-07-04 RX ADMIN — OXYCODONE HYDROCHLORIDE 10 MG: 5 SOLUTION ORAL at 17:38

## 2017-07-04 RX ADMIN — OXYCODONE HYDROCHLORIDE 10 MG: 5 SOLUTION ORAL at 22:00

## 2017-07-04 RX ADMIN — I.V. FAT EMULSION 180 ML: 20 EMULSION INTRAVENOUS at 20:53

## 2017-07-04 RX ADMIN — SIMETHICONE 40 MG: 20 SUSPENSION/ DROPS ORAL at 13:15

## 2017-07-04 RX ADMIN — SIMETHICONE 40 MG: 20 SUSPENSION/ DROPS ORAL at 17:42

## 2017-07-04 RX ADMIN — GABAPENTIN 300 MG: 250 SOLUTION ORAL at 22:00

## 2017-07-04 RX ADMIN — Medication 6 MG: at 20:33

## 2017-07-04 RX ADMIN — LOPERAMIDE HYDROCHLORIDE 4 MG: 1 SOLUTION ORAL at 13:15

## 2017-07-04 RX ADMIN — Medication 2 PACKET: at 13:16

## 2017-07-04 RX ADMIN — METOPROLOL TARTRATE 12.5 MG: 100 TABLET, FILM COATED ORAL at 20:36

## 2017-07-04 RX ADMIN — LOPERAMIDE HYDROCHLORIDE 4 MG: 1 SOLUTION ORAL at 20:33

## 2017-07-04 RX ADMIN — Medication 10 ML: at 09:22

## 2017-07-04 RX ADMIN — CHLORHEXIDINE GLUCONATE 15 ML: 1.2 RINSE ORAL at 13:16

## 2017-07-04 NOTE — PROGRESS NOTES
[facility]  Transitional Care Unit Progress Note  Minerva Blanco  [id number]  July 4, 2017         Assessment & Plan:   Minerva is a 72 year old female with PMH of atrial fibrillation, breast cancer s/p lumpectomy 2007, fibromyalgia, GERD, IBS, meniere's disease, MS and symptomatic hiatal hernia. S/p Toupet fundoplication 1/2016 c/b esophageal perforation with mediastinal phlegmon s/p initial proximal gastrectomy, distal esophagectomy, spit fistula creation, left thoracotomy with mediastinal phlegmon excision on 1/9/2017. Since that time, she has undergone mulptiple procedures for management including esophageal dilations, stent placement/removal, washouts, lysis of adhesions, jejunostomy feeding tube placement, retrosternal gastric pull-through, resection of left half of the manubrium, spit fistula takedown, right tube thoracostomy and pharyngostomy tube placement. She most recently was admitted 5/18 and diagnosed with anastomic leak with mediastical abscess. S/p mediastinal wound debridement with chest tube placement (5/19) with subsequent pharyngostomy tube 5/31, stent placement 6/4 with exchange 6/27 and multiple washouts (6/2, 6/4, 6/9, 6/14, 6/27). Hospital course complicated by diarrhea (r/t TFs), surgical pain (seen by palliative). Transferred to TCU 6/29/17 for further rehab needs and attempts to advance tolerance to TFs.      Physical deconditioning. 2/2 complicated hospital/surgical course and underlying MS. PT,OT completed. Not on MS pharmacologic therapies prior.     Anastomotic leak with mediastinal abscess. S/p mediastinal wound debridement with chest tube placement (5/19) with subsequent pharyngostomy tube 5/31, stent placement 6/4 with exchange 6/27 and multiple washouts (6/2, 6/4, 6/9, 6/14, 6/27). No antibiotic or antifungal needs. S/p wound cultures with normal autumn, candida albicans growth. Blood cultures NG. Afebrile. No leukocytosis. Still with sternal wound  requiring daily dressing changes.   - Continue pharyngostomy tube to LIS. Irrigate with 30cc q 8 hours.   - Continue NPO status with TF, FW flush support.  - Continue protonix 40mg daily  - Continue oral cares with peridex TID swish and spit.    - WOCN following for dressing recommendations. Of note, having gastric drainage from wound prior to transfer.   - Discharge back to [facility] for EGD, washout 7/17 if not discharged.   - Will contact thoracic surgery tomorrow to discuss dispo given not meeting needs on TFs     Acute pain (right shoulder, surgical site). Right anterior shoulder pain 6/29 after lifting belongings. Likely Strain vs. Tendinitis based on exam.    - One time flexeril to see effect  - Heat to shoulder site prn.   - Continue pain control with fentanyl 100mcg every 72 hours (Per palliative, decrease by 12 mcg every 6 days starting on 6/30), oxycodone 10-20mg every 3 hours prn, lidoderm patch to left chest. If with escalated pain, consult palliative.      Severe malnutrition in the context of chronic illness. Transferred on TPN, TF combo 2/2 TF intolerance with inability to meet caloric needs. Continued on TPN with TFs. Attempting to cycle TFs, but unable to cycle at goal rate, thus TPN still necessary. Nutrition following, appreciate assistance.    Diarrhea 2/2 TFs with h/o IBS. C Diff neg 5/27. Stool culture neg 6/30. CMV not detected 6/30. Pancreatic elastase 163 (mild to mod).   - Continue lomotil QID, imodium QID, florastor BID, fiber TID, and cholestyramine therapies.   - Opium of tincture prn used at hospital. Monitor frequency, OP. Not currently ordered.   - BMP, mag, phos screens MWF.      Hx of Post op Atrial fibrillation. CHADsVASC2. Previously on coumadin, held 2/2 surgical interventions. Per patient, A.Fib x 1 assoc with surgery.  ECHO 1/2017 with EF >70%, mild pulmonary HTN, biatrial enlargement ,mild mitral valve annular calcification and mild aortic valve sclerosis. Has been on  metoprolol 25mg BID for rate control (no hx of HTN).  EKG 7/2 NSR. BPs normotensive.  - Patient would like to trial off metoprolol, but given plan for upcoming washout, recommend continuing but at decreased dose of 12.5mg BID  - Continue ppx lovenox. Chadsvasc risk low. High risk for aprupt clinical changes requiring surgical intervention. Urge OP PCP vs. Cardiology visit to set up holter and determine frequency of atrial fib to assist with coumadin recommendations.    Physical deconditioning. 2/2 complicated hospital/surgical course and underlying MS. PT,OT completed. Not on MS pharmacologic therapies prior.      Discussed with Dr. Kiet MD    Diet and/or tube feedings: NPO, TFs, TPN  Lines, tubes, drains: PICC  DVT/GI prophylaxis: lovenox ppx  Indications for psychotropic medications: none  Code status: Full Code         Consults:   nutrition         Discharge Planning:   Pending nutrition goals. If able to meet nutrition needs then can d/c before washout on 7/17. Otherwise will d/c back to hospital on 7/17.         Interval History:   Minerva is a little anxious today. She really wants to go home soon. She tells me that the thoracic team said she can go home like she is now. We discuss how she is on TFs but not at goal rate to meet her caloric needs, she disagrees. She tells me that Mike Rogers (PA with thoracic) said she can go home whenever. She seems to be upset, so we have a long discussion on how we just want to make sure she goes home and does well and gets the adequate nutrition she needs. She agrees with this and feels better about the plan. No chest pain, sob, or dyspnea. Walking around without issue. No other concerns.          Physical Exam:   Vitals were reviewed  Blood pressure 122/53, pulse 85, temperature 97  F (36.1  C), temperature source Oral, resp. rate 18, height 1.524 m (5'), weight 41 kg (90 lb 6.4 oz), SpO2 97 %, not currently breastfeeding.  General:alert, NAD, walking around room,  appears anxious   HEENT: MMM, no thrush, swallow intact, pharyngostomy tube to L neck with suture intact  Cardiovascular: RRR  Lungs:CTAB  Abdomen: normoactive BS, soft with no distention and no tenderness   Vascular: no peripheral edema, distal pulses palpable  Neurologic: AAO X 3, CN 2-12 grossly intact, no focal deficits  Skin: no jaundice. Chest wound x 2 c/d/i. Superior wound closed, scabbed over. Inferior incision with bandage over site.       Wendy Lion [facility]  Internal Medicine [place] Hospitalist  [phone]

## 2017-07-04 NOTE — PROGRESS NOTES
Pt up in hallway with . Medicated x 2 for pain with some relief. Right shoulder bothers her. Pt refuses some of the water down her j tube. States she can't tolerate that much liquid. Pt states she has had many loose stools this shift. Tincture of opium given in addition to her other antidiarrheals. Lungs clear. Skin intact other than chest wound.

## 2017-07-04 NOTE — PLAN OF CARE
Problem: Goal Outcome Summary  Goal: Goal Outcome Summary  Outcome: No Change  RN: R shoulder pain comfortably managed with Roxicodone 10-15 mg sol via J-tube x 2 this shift. Warm pack was also applied on the area after Lidoderm patch was removed this am. TPN and Lipid infusing per order via PICC. Had stool mixed with urine x 1 in the BSC. Up independently and able to do own perineal cares. R chest dressing CDI. TF started at 0500 per pt request. TwoCal HN currently infusing at 35 ml/hr via J-tube. Still no bag available with flushing per SPD. Pt aware. Has been refusing H20 flushes of 30 ml q 4H and directs when and how much to flush her J-tube. AM nurse updated for the need to manually flush the feeding tube. Pharyngostomy tube intact and attached to low intermittent suction. Only tolerated 20 cc NS flush this am. 100 ml Green drainage noted this shift. Appear to be sleeping/resting between cares. Using call light appropriately. Continue with plan of care.

## 2017-07-04 NOTE — PLAN OF CARE
Problem: Goal Outcome Summary  Goal: Goal Outcome Summary  Outcome: No Change  Patient independent with comode,abdominal discomfort mostly,PRN pain medication given x 2 with relief.G-tube site dressing C/D/I,j-tube is infusing @ 35 cc/hour.left neck drain intact,sutures in place,site cleansed with Hibiclens solution and tube flushed with 30 cc saline,sucion container 800 cc emptied in the morning @ 1400 there was 200 cc inside the container to empty when full.Patient controls water flushes.

## 2017-07-05 LAB
ANION GAP SERPL CALCULATED.3IONS-SCNC: 11 MMOL/L (ref 3–14)
BUN SERPL-MCNC: 23 MG/DL (ref 7–30)
CALCIUM SERPL-MCNC: 8.4 MG/DL (ref 8.5–10.1)
CHLORIDE SERPL-SCNC: 104 MMOL/L (ref 94–109)
CO2 SERPL-SCNC: 25 MMOL/L (ref 20–32)
CREAT SERPL-MCNC: 0.39 MG/DL (ref 0.52–1.04)
GFR SERPL CREATININE-BSD FRML MDRD: ABNORMAL ML/MIN/1.7M2
GLUCOSE SERPL-MCNC: 90 MG/DL (ref 70–99)
INTERPRETATION ECG - MUSE: NORMAL
MAGNESIUM SERPL-MCNC: 2 MG/DL (ref 1.6–2.3)
PHOSPHATE SERPL-MCNC: 3.8 MG/DL (ref 2.5–4.5)
POTASSIUM SERPL-SCNC: 4.2 MMOL/L (ref 3.4–5.3)
SODIUM SERPL-SCNC: 140 MMOL/L (ref 133–144)

## 2017-07-05 PROCEDURE — A9270 NON-COVERED ITEM OR SERVICE: HCPCS | Mod: GY | Performed by: PHYSICIAN ASSISTANT

## 2017-07-05 PROCEDURE — 25000125 ZZHC RX 250: Performed by: NURSE PRACTITIONER

## 2017-07-05 PROCEDURE — 25000132 ZZH RX MED GY IP 250 OP 250 PS 637: Mod: GY | Performed by: INTERNAL MEDICINE

## 2017-07-05 PROCEDURE — 25000132 ZZH RX MED GY IP 250 OP 250 PS 637: Mod: GY | Performed by: PHYSICIAN ASSISTANT

## 2017-07-05 PROCEDURE — 25000128 H RX IP 250 OP 636: Performed by: INTERNAL MEDICINE

## 2017-07-05 PROCEDURE — 12000022 ZZH R&B SNF

## 2017-07-05 PROCEDURE — A9270 NON-COVERED ITEM OR SERVICE: HCPCS | Mod: GY | Performed by: INTERNAL MEDICINE

## 2017-07-05 PROCEDURE — 84100 ASSAY OF PHOSPHORUS: CPT | Performed by: NURSE PRACTITIONER

## 2017-07-05 PROCEDURE — 36592 COLLECT BLOOD FROM PICC: CPT | Performed by: NURSE PRACTITIONER

## 2017-07-05 PROCEDURE — 83735 ASSAY OF MAGNESIUM: CPT | Performed by: NURSE PRACTITIONER

## 2017-07-05 PROCEDURE — 25000132 ZZH RX MED GY IP 250 OP 250 PS 637: Mod: GY | Performed by: NURSE PRACTITIONER

## 2017-07-05 PROCEDURE — A9270 NON-COVERED ITEM OR SERVICE: HCPCS | Mod: GY | Performed by: NURSE PRACTITIONER

## 2017-07-05 PROCEDURE — 80048 BASIC METABOLIC PNL TOTAL CA: CPT | Performed by: NURSE PRACTITIONER

## 2017-07-05 RX ADMIN — OXYCODONE HYDROCHLORIDE 10 MG: 5 SOLUTION ORAL at 12:47

## 2017-07-05 RX ADMIN — SIMETHICONE 40 MG: 20 SUSPENSION/ DROPS ORAL at 12:46

## 2017-07-05 RX ADMIN — OXYCODONE HYDROCHLORIDE 10 MG: 5 SOLUTION ORAL at 01:17

## 2017-07-05 RX ADMIN — ACETAMINOPHEN 975 MG: 160 SUSPENSION ORAL at 08:46

## 2017-07-05 RX ADMIN — OXYCODONE HYDROCHLORIDE 10 MG: 5 SOLUTION ORAL at 04:58

## 2017-07-05 RX ADMIN — MINERAL SUPPLEMENT IRON 300 MG / 5 ML STRENGTH LIQUID 100 PER BOX UNFLAVORED 300 MG: at 08:48

## 2017-07-05 RX ADMIN — CHLORHEXIDINE GLUCONATE 15 ML: 1.2 RINSE ORAL at 13:57

## 2017-07-05 RX ADMIN — PANTOPRAZOLE SODIUM 40 MG: 40 TABLET, DELAYED RELEASE ORAL at 13:57

## 2017-07-05 RX ADMIN — OXYCODONE HYDROCHLORIDE 10 MG: 5 SOLUTION ORAL at 08:49

## 2017-07-05 RX ADMIN — OXYCODONE HYDROCHLORIDE 15 MG: 5 SOLUTION ORAL at 18:13

## 2017-07-05 RX ADMIN — LOPERAMIDE HYDROCHLORIDE 4 MG: 1 SOLUTION ORAL at 21:10

## 2017-07-05 RX ADMIN — SIMETHICONE 40 MG: 20 SUSPENSION/ DROPS ORAL at 08:46

## 2017-07-05 RX ADMIN — OXYCODONE HYDROCHLORIDE 15 MG: 5 SOLUTION ORAL at 21:05

## 2017-07-05 RX ADMIN — CHLORHEXIDINE GLUCONATE 15 ML: 1.2 RINSE ORAL at 08:47

## 2017-07-05 RX ADMIN — GABAPENTIN 300 MG: 250 SOLUTION ORAL at 13:57

## 2017-07-05 RX ADMIN — SIMETHICONE 40 MG: 20 SUSPENSION/ DROPS ORAL at 21:11

## 2017-07-05 RX ADMIN — ACETAMINOPHEN 975 MG: 160 SUSPENSION ORAL at 21:04

## 2017-07-05 RX ADMIN — Medication 2 PACKET: at 18:17

## 2017-07-05 RX ADMIN — Medication 10 ML: at 08:47

## 2017-07-05 RX ADMIN — Medication 10 ML: at 21:10

## 2017-07-05 RX ADMIN — Medication 2 PACKET: at 09:44

## 2017-07-05 RX ADMIN — Medication 10 ML: at 18:28

## 2017-07-05 RX ADMIN — FENTANYL 1 PATCH: 100 PATCH, EXTENDED RELEASE TRANSDERMAL at 18:32

## 2017-07-05 RX ADMIN — METOPROLOL TARTRATE 12.5 MG: 100 TABLET, FILM COATED ORAL at 08:48

## 2017-07-05 RX ADMIN — CYCLOBENZAPRINE HYDROCHLORIDE 10 MG: 5 TABLET, FILM COATED ORAL at 09:44

## 2017-07-05 RX ADMIN — LOPERAMIDE HYDROCHLORIDE 4 MG: 1 SOLUTION ORAL at 08:47

## 2017-07-05 RX ADMIN — GABAPENTIN 300 MG: 250 SOLUTION ORAL at 05:01

## 2017-07-05 RX ADMIN — Medication 250 MG: at 08:48

## 2017-07-05 RX ADMIN — DIPHENHYDRAMINE HYDROCHLORIDE 25 MG: 25 CAPSULE ORAL at 21:19

## 2017-07-05 RX ADMIN — Medication 10 ML: at 12:46

## 2017-07-05 RX ADMIN — SIMETHICONE 40 MG: 20 SUSPENSION/ DROPS ORAL at 18:28

## 2017-07-05 RX ADMIN — LOPERAMIDE HYDROCHLORIDE 4 MG: 1 SOLUTION ORAL at 18:28

## 2017-07-05 RX ADMIN — CHLORHEXIDINE GLUCONATE 15 ML: 1.2 RINSE ORAL at 21:12

## 2017-07-05 RX ADMIN — Medication 250 MG: at 21:00

## 2017-07-05 RX ADMIN — METOPROLOL TARTRATE 12.5 MG: 100 TABLET, FILM COATED ORAL at 21:12

## 2017-07-05 RX ADMIN — LOPERAMIDE HYDROCHLORIDE 4 MG: 1 SOLUTION ORAL at 12:46

## 2017-07-05 RX ADMIN — Medication 2 PACKET: at 13:57

## 2017-07-05 RX ADMIN — ENOXAPARIN SODIUM 40 MG: 40 INJECTION SUBCUTANEOUS at 12:46

## 2017-07-05 RX ADMIN — SODIUM CHLORIDE, PRESERVATIVE FREE 5 ML: 5 INJECTION INTRAVENOUS at 09:43

## 2017-07-05 RX ADMIN — GABAPENTIN 300 MG: 250 SOLUTION ORAL at 21:11

## 2017-07-05 RX ADMIN — LIDOCAINE 2 PATCH: 50 PATCH CUTANEOUS at 21:01

## 2017-07-05 RX ADMIN — TRAZODONE HYDROCHLORIDE 100 MG: 50 TABLET ORAL at 21:19

## 2017-07-05 RX ADMIN — CHOLESTYRAMINE 4 G: 4 POWDER, FOR SUSPENSION ORAL at 09:44

## 2017-07-05 ASSESSMENT — PAIN DESCRIPTION - DESCRIPTORS: DESCRIPTORS: ACHING

## 2017-07-05 NOTE — PLAN OF CARE
Problem: Goal Outcome Summary  Goal: Goal Outcome Summary  Patient slept through the night waking for pain medications. PRN Oxycodone 10 mg provided relief. Independently transferring to BSC. Loose BM x1 and void x1. Patient is requesting tubefeeding to start at 0500. Continue plan of care.

## 2017-07-05 NOTE — PLAN OF CARE
Problem: Goal Outcome Summary  Goal: Goal Outcome Summary  RN: Using prn oxycodone with relief along with scheduled medications and fentanyl patch.  Up independently without problems, on BSC multiple times with diarrhea, antidiarrheal meds continue. Pt declines tincture of opium at this time, continue to monitor. See new orders for tube feed rate change, presently running at 45cc/hour. See also new order for TPN/Lipid. Green output continues from neck drain, continue to monitor.

## 2017-07-05 NOTE — UTILIZATION REVIEW
Pain Interview  Admission    1. Have you had pain or hurting at any time in the last 5 days?  Yes  2. How much of the time have you experienced pain or hurting over the last 5 days? Frequently  3. Over the past 5 days, has pain made it hard for you to sleep at night?  Yes  4. Over the past 5 days, have you limited your day-to-day activities because of pain?Yes  5. Rate pain intensity: Numeric 6       Where is your pain? Where my pharyngostomy tube is  How would you describe it? Constant throbbing  What makes it better?pain meds  What makes it worse? Moving the wrong way

## 2017-07-05 NOTE — PLAN OF CARE
Problem: Goal Outcome Summary  Goal: Goal Outcome Summary  Outcome: No Change  Alert and oriented x4, c/o abd and R shoulder pain gave oxycodone 10mg x 2, independent in room using commode, loose stool yellowish color x 5-6times, done J tube site care, flushed L neck suction tube site care and flushed with 30cc NS and applied new dressing, PICC line dressing intact and TPN and lipid is running.

## 2017-07-05 NOTE — PROGRESS NOTES
CLINICAL NUTRITION SERVICES - REASSESSMENT NOTE     Nutrition Prescription    RECOMMENDATIONS FOR MDs/PROVIDERS TO ORDER:  None today    Malnutrition Status:    Non-severe malnutrition in the context of chronic illness    Recommendations already ordered by Registered Dietitian (RD):  1. Increase TF rate as follows:  TwoCal HN @ 50 mL/hr x 16 hours (800 mL/day) to provide 1600 kcal (40 kcal/kg), 67 g PRO (1.7 g/kg), 175 g CHO, 73 g fat, 4 g fiber, and 560 mL free water daily. This is meeting 100% energy and protein needs per dosing weight of 41 kg.   -- Water flushes: 75 ml q 2 hours -- total 900 ml/day (TF + flushes provide 100% of fluid needs)    2. Discontinue TPN    Future/Additional Recommendations:  Pending tolerance/wt trends, may need to increase TF volume as pt gains wt back to healthy wt (currently underweight) or if she is losing wt as needed (given wt maintenance with PN+EN providing 140% energy needs previously)       EVALUATION OF THE PROGRESS TOWARD GOALS   Diet: NPO  Nutrition Support:   TF via J-tube: TwoCal HN @ 35 mL/hr x 16 hours (560 mL/day, daytime only) to provide 1120 kcal (29 kcal/kg), 47 g PRO (1.2 g/kg), 123 g CHO, 51 g fat, 3 g fiber, and 392 mL/day free water meeting 78% energy and 76% protein needs per dosing weight 41 kg.     - TF Intakes: Pt denies turning off TF or turning down rate as she did last week. When visiting with pt, TF rate was increased to 45 ml/hr and pt reports tolerating well. Uncertain of how long she had rate increased as she manipulates TF rate frequently.      TPN: Cycled @ 110 ml/hr x12 hrs + 60 ml/hr x1 hr before and after providing 1440 mL/day with 165 g dextrose, 65 g AA, and 180 mL 20% IV lipids 5 times per week which provides 1078 kcal (26 kcal/kg), 1.6 g/kg PRO, GIR 5.25 mg/kg/min, 0.9 g/kg/day fat and 24% kcal from fat per dosing weight 41 kg. This meets 75% estimated kcal needs from sole PN and 100% estimated PRO needs per dosing weight 40 kg.     TPN + TF  is providing 100% of energy and protein needs.      NEW FINDINGS   - Pt is currently tolerating TF rate of 45 ml/hr and is willing to increase rate in order to d/c TPN.   - Loose stools have improved to ~5/day (reports last week 30-40x/day).  - Wt has been stable over the last 1 week at ~91 lbs. PN+EN has been providing ~140% energy needs - unclear if pt has not been receiving 100% of TF vs needs are higher than estimated.     MALNUTRITION  % Intake: Decreased intake does not meet criteria (reliant on nutrition support to meet 100% of nutritional needs)  % Weight Loss: Weight loss does not meet criteria  Subcutaneous Fat Loss: Facial region: moderate, Upper and Lower arm: moderate  Muscle Loss: Temporal, Facial & jaw region, Upper arm (bicep, tricep), Lower arm (forearm), and Posterior calf: moderate  Fluid Accumulation/Edema: None noted  Malnutrition Diagnosis: Non-severe malnutrition in the context of chronic illness    Previous Goals   Total avg nutritional intake to meet a minimum of 26 kcal/kg (solely from PN) or 35 kcal/kg (PN+EN) and 1.5 g PRO/kg daily (per dosing wt 41 kg)  Evaluation: Met    Previous Nutrition Diagnosis  Inadequate enteral nutrition infusion related to loose stools/abdominal cramping hindering consistent TF tolerances/intake at goal rate as evidenced by pt unable to tolerate TF goal rate x16 hrs without reducing rate for 1-2+ hrs/day and frequent diarrhea 30-40 times per day.   Evaluation: Improving    CURRENT NUTRITION DIAGNOSIS  Predicted nadequate enteral nutrition infusion related to loose stools and fullness as evidenced by although currently tolerating 45 ml/hr, pt frequently manipulating TF pump rate and previously was unable to tolerate goal rate of 35 ml/hr x 16 hrs.       INTERVENTIONS  Implementation  Collaboration with other providers - discussed nutrition POC with pt and REJI and pt is frustrated but accepting of TF goals in order to d/c TPN. Spoke to pharmacy and TPN will be  d/c today.  Enteral Nutrition - Modify rate/volume to 50 ml x 16 hrs  Parenteral Nutrition/IV Fluids - Modify volume -- d/c    Goals  Total avg nutritional intake to meet a minimum of 35 kcal/kg (EN) and 1.5 g PRO/kg daily (per dosing wt 41 kg)    Monitoring/Evaluation  Progress toward goals will be monitored and evaluated per protocol.      Chelo Coon, ALEX  Unit Pager: [phone]

## 2017-07-05 NOTE — PROGRESS NOTES
"   07/05/17 0955   Visit Information   Visit Made By  Intern (Level 1)   Type of Visit Initial   Visited Patient   SPIRITUAL HEALTH SERVICES  SPIRITUAL ASSESSMENT Progress Note  [facility] [place] [id number]     REFERRAL SOURCE: Initial visit.     I introduced myself to pt. Minerva, and let her know I am available if she desired a .  She thanked me, and said how happy she was to have her home  visit and bring communion.  She also mentioned that she and her  read a morning devotional shortly before I arrived.  She hopes to be out of the hospital soon, \"I have been in the hospital for the past two and half months...I am ready to go home.\"  She thanked me for the visit.     PLAN: Will follow while on unit.    Ovidio Oseguera   Intern  Pager [phone]  "

## 2017-07-05 NOTE — PLAN OF CARE
Brief medicine note  July 5, 2017      Discussed nutrition recs with thoracic surgery. They agree with patient being high risk to go home on TPN. We discuss how patient is not at goal TF rate, but states that she is getting enough per thoracic surgery recs. Thoracic surgery recommends going with whatever nutrition recommends and do not recall telling patient otherwise. We discuss this with patient and she agrees to increase TFs to 50cc/hr for 16hour cycle which will meet 100% of needs. Stop TPN.    Wendy Storey PA-C

## 2017-07-05 NOTE — PROGRESS NOTES
Social Work: Initial Assessment with Discharge Plan    Patient Name: Minerva Blanco  : 1945  Age: 72 year old  MRN: [id number]  Completed assessment with: Pt  Admitted to TCU: 2017    Presenting Information   Date of SW assessment: 2017  Health Care Directive: Copy in Chart  Primary Health Care Agent: Enrique Santoro  [phone]  Secondary Health Care Agent: Chandan Sosa -[phone]  Living Situation: Pt lives at home with her  Enrique.  Previous Functional Status: Independent before [facility].   DME available: See therapy notes  Patient and family understanding of hospitalization: Pt stated that she is here for tube feeding and wound care.  Cultural/Language/Spiritual Considerations: English speaking  Abuse concerns: No concerns.   BIMS: Pt scored on BIMS indicating  (PT DECLINED)  PHQ-9: Pt scored on PHQ-9 indicating (PT DECLINED)  PAS: confirmation number- [id number]  Has there been a level II screen?  No, Pt does not meet criteria.  Were there any recommendations in the screen? N/A  If yes, will the recommendations we incorporated into the Plan of Care?  N/A  Physical Health  Reason for admission:   1. Diarrhea    2. Paroxysmal atrial fibrillation (H)    3. Gastroesophageal reflux disease, esophagitis presence not specified        Provider Information   Primary Care Physician:Andrea Nino   : [facility]    Mental Health:   Diagnosis: No concerns stated by pt.   Current Support/Services: NA  Previous Services: NA  Services Needed/Recommended: NA    Substance Use:  Diagnosis: No concerns stated by pt.  Current Support/Services: NA  Previous Services: NA  Services Needed/Recommended: NA    Support System  Marital Status:   Family support: Pt has the support of her spouse Enrique and DTR Javierrachel.   Other support available: No other supports stated.  Gaps in support system: No apparent gaps.     Community Resources  Current in home services: No current  services.  Previous services: [facility]- will continue when the pt returns home.     Financial/Employment/Education  Employment Status: Retired  Income Source: SSI  Education: College  Financial Concerns:  No concerns.  Insurance: Medicare/ BCBS      Discharge Plan   Patient and family discharge goal: Pt stated that she wants to go home Friday 7/7 before she has surgery on the 17 th of July. But there are concerns about the pt going home-TBD  Provided Education on discharge plan: YES  Patient agreeable to discharge plan:  YES    Barriers to discharge: Medical clearance, TF/TPN teaching, safe DC plan.     Discharge Recommendations   Disposition: Hospital on 7/17/2017  Transportation Needs: [employer] if the pt returns to the hospital. [employer] would transport if the pt stays here until her surgery.   Name of Transportation Company and Phone: [employer]       07/05/17 1100   Living Arrangements   Lives With spouse   Living Arrangements house   Able to Return to Prior Living Arrangements yes   Discharge Planning   Expected Discharge Date 07/17/17   Anticipated Discharge Disposition (Surgery coming up on the 17th. )   Discharge Needs Assessment   Patient/family verbalizes understanding of discharge plan recommendations? Yes   Abuse Risk Screen   QUESTION TO PATIENT:  Has a member of your family or a partner(now or in the past) intimidated, hurt, manipulated, or controlled you in any way? no   QUESTION TO PATIENT: Do you feel safe going back to the place where you are living? yes   OBSERVATION: Is there reason to believe there has been maltreatment of a vulnerable adult (ie. Physical/Sexual/Emotional abuse, self neglect, lack of adequate food, shelter, medical care, or financial exploitation)? no   Mental Health Suicide Risk   Have you ever thought about hurting yourself now or in the past? no       KILEY Medeiros  BRIANA   P: [phone]  Pgr: [phone]

## 2017-07-06 PROCEDURE — 25000132 ZZH RX MED GY IP 250 OP 250 PS 637: Mod: GY | Performed by: NURSE PRACTITIONER

## 2017-07-06 PROCEDURE — A9270 NON-COVERED ITEM OR SERVICE: HCPCS | Mod: GY | Performed by: NURSE PRACTITIONER

## 2017-07-06 PROCEDURE — A9270 NON-COVERED ITEM OR SERVICE: HCPCS | Mod: GY | Performed by: PHYSICIAN ASSISTANT

## 2017-07-06 PROCEDURE — 25000132 ZZH RX MED GY IP 250 OP 250 PS 637: Mod: GY | Performed by: INTERNAL MEDICINE

## 2017-07-06 PROCEDURE — 00220000 ZZH SNF RUG CODE OPNP

## 2017-07-06 PROCEDURE — 25000125 ZZHC RX 250: Performed by: NURSE PRACTITIONER

## 2017-07-06 PROCEDURE — 12000022 ZZH R&B SNF

## 2017-07-06 PROCEDURE — 25000132 ZZH RX MED GY IP 250 OP 250 PS 637: Mod: GY | Performed by: PHYSICIAN ASSISTANT

## 2017-07-06 PROCEDURE — A9270 NON-COVERED ITEM OR SERVICE: HCPCS | Mod: GY | Performed by: INTERNAL MEDICINE

## 2017-07-06 PROCEDURE — 99316 NF DSCHRG MGMT 30 MIN+: CPT | Performed by: PHYSICIAN ASSISTANT

## 2017-07-06 PROCEDURE — 25000128 H RX IP 250 OP 636: Performed by: INTERNAL MEDICINE

## 2017-07-06 RX ORDER — CHLORHEXIDINE GLUCONATE ORAL RINSE 1.2 MG/ML
15 SOLUTION DENTAL 2 TIMES DAILY
Status: ON HOLD | COMMUNITY
Start: 2017-07-06 | End: 2017-10-20

## 2017-07-06 RX ORDER — GABAPENTIN 250 MG/5ML
300 SOLUTION ORAL EVERY 8 HOURS
Qty: 550 ML | Refills: 0 | Status: SHIPPED | OUTPATIENT
Start: 2017-07-06 | End: 2017-09-13

## 2017-07-06 RX ORDER — FENTANYL 75 UG/1
1 PATCH TRANSDERMAL
Qty: 5 PATCH | Refills: 0 | Status: SHIPPED | OUTPATIENT
Start: 2017-07-08 | End: 2017-08-24

## 2017-07-06 RX ORDER — PANTOPRAZOLE SODIUM 40 MG/1
TABLET, DELAYED RELEASE ORAL
Qty: 30 TABLET | Refills: 0 | Status: SHIPPED | OUTPATIENT
Start: 2017-07-06 | End: 2017-07-26

## 2017-07-06 RX ORDER — PANTOPRAZOLE SODIUM 20 MG/1
TABLET, DELAYED RELEASE ORAL
Qty: 30 TABLET | Refills: 0 | Status: SHIPPED | OUTPATIENT
Start: 2017-07-06 | End: 2017-07-06

## 2017-07-06 RX ORDER — FENTANYL 75 UG/1
75 PATCH TRANSDERMAL
Status: DISCONTINUED | OUTPATIENT
Start: 2017-07-08 | End: 2017-07-07 | Stop reason: HOSPADM

## 2017-07-06 RX ORDER — OXYCODONE HYDROCHLORIDE 10 MG/1
10 TABLET ORAL EVERY 4 HOURS PRN
Qty: 60 TABLET | Refills: 0 | Status: SHIPPED | OUTPATIENT
Start: 2017-07-06 | End: 2017-07-26

## 2017-07-06 RX ORDER — MORPHINE 10 MG/ML
0.6 TINCTURE ORAL EVERY 6 HOURS PRN
Qty: 118 ML | Refills: 0 | Status: SHIPPED | OUTPATIENT
Start: 2017-07-06 | End: 2017-08-24

## 2017-07-06 RX ADMIN — GABAPENTIN 300 MG: 250 SOLUTION ORAL at 21:07

## 2017-07-06 RX ADMIN — CYCLOBENZAPRINE HYDROCHLORIDE 10 MG: 5 TABLET, FILM COATED ORAL at 12:31

## 2017-07-06 RX ADMIN — METOPROLOL TARTRATE 12.5 MG: 100 TABLET, FILM COATED ORAL at 21:11

## 2017-07-06 RX ADMIN — CHLORHEXIDINE GLUCONATE 15 ML: 1.2 RINSE ORAL at 13:54

## 2017-07-06 RX ADMIN — Medication 250 MG: at 08:26

## 2017-07-06 RX ADMIN — OXYCODONE HYDROCHLORIDE 15 MG: 5 SOLUTION ORAL at 23:58

## 2017-07-06 RX ADMIN — Medication 2 PACKET: at 12:24

## 2017-07-06 RX ADMIN — ACETAMINOPHEN 975 MG: 160 SUSPENSION ORAL at 08:26

## 2017-07-06 RX ADMIN — CHOLESTYRAMINE 4 G: 4 POWDER, FOR SUSPENSION ORAL at 21:08

## 2017-07-06 RX ADMIN — OXYCODONE HYDROCHLORIDE 10 MG: 5 SOLUTION ORAL at 05:12

## 2017-07-06 RX ADMIN — LOPERAMIDE HYDROCHLORIDE 4 MG: 1 SOLUTION ORAL at 17:27

## 2017-07-06 RX ADMIN — Medication 2 PACKET: at 17:27

## 2017-07-06 RX ADMIN — LOPERAMIDE HYDROCHLORIDE 4 MG: 1 SOLUTION ORAL at 13:54

## 2017-07-06 RX ADMIN — TRAZODONE HYDROCHLORIDE 100 MG: 50 TABLET ORAL at 21:07

## 2017-07-06 RX ADMIN — MINERAL SUPPLEMENT IRON 300 MG / 5 ML STRENGTH LIQUID 100 PER BOX UNFLAVORED 300 MG: at 08:25

## 2017-07-06 RX ADMIN — Medication 6 MG: at 13:54

## 2017-07-06 RX ADMIN — CHOLESTYRAMINE 4 G: 4 POWDER, FOR SUSPENSION ORAL at 09:50

## 2017-07-06 RX ADMIN — Medication 10 ML: at 17:27

## 2017-07-06 RX ADMIN — SIMETHICONE 40 MG: 20 SUSPENSION/ DROPS ORAL at 08:27

## 2017-07-06 RX ADMIN — CHLORHEXIDINE GLUCONATE 15 ML: 1.2 RINSE ORAL at 21:07

## 2017-07-06 RX ADMIN — Medication 10 ML: at 12:24

## 2017-07-06 RX ADMIN — OXYCODONE HYDROCHLORIDE 10 MG: 5 SOLUTION ORAL at 02:18

## 2017-07-06 RX ADMIN — OXYCODONE HYDROCHLORIDE 10 MG: 5 SOLUTION ORAL at 17:26

## 2017-07-06 RX ADMIN — Medication 10 ML: at 21:08

## 2017-07-06 RX ADMIN — METOPROLOL TARTRATE 12.5 MG: 100 TABLET, FILM COATED ORAL at 09:49

## 2017-07-06 RX ADMIN — LOPERAMIDE HYDROCHLORIDE 4 MG: 1 SOLUTION ORAL at 08:25

## 2017-07-06 RX ADMIN — OXYCODONE HYDROCHLORIDE 15 MG: 5 SOLUTION ORAL at 21:06

## 2017-07-06 RX ADMIN — GABAPENTIN 300 MG: 250 SOLUTION ORAL at 05:12

## 2017-07-06 RX ADMIN — GABAPENTIN 300 MG: 250 SOLUTION ORAL at 13:54

## 2017-07-06 RX ADMIN — ACETAMINOPHEN 975 MG: 160 SUSPENSION ORAL at 21:06

## 2017-07-06 RX ADMIN — CHLORHEXIDINE GLUCONATE 15 ML: 1.2 RINSE ORAL at 08:25

## 2017-07-06 RX ADMIN — SODIUM CHLORIDE, PRESERVATIVE FREE 5 ML: 5 INJECTION INTRAVENOUS at 12:25

## 2017-07-06 RX ADMIN — SIMETHICONE 40 MG: 20 SUSPENSION/ DROPS ORAL at 12:25

## 2017-07-06 RX ADMIN — LOPERAMIDE HYDROCHLORIDE 4 MG: 1 SOLUTION ORAL at 21:08

## 2017-07-06 RX ADMIN — ENOXAPARIN SODIUM 40 MG: 40 INJECTION SUBCUTANEOUS at 12:24

## 2017-07-06 RX ADMIN — PANTOPRAZOLE SODIUM 40 MG: 40 TABLET, DELAYED RELEASE ORAL at 13:54

## 2017-07-06 RX ADMIN — Medication 10 ML: at 08:25

## 2017-07-06 RX ADMIN — SIMETHICONE 40 MG: 20 SUSPENSION/ DROPS ORAL at 21:07

## 2017-07-06 RX ADMIN — ACETAMINOPHEN 975 MG: 160 SUSPENSION ORAL at 13:54

## 2017-07-06 RX ADMIN — OXYCODONE HYDROCHLORIDE 10 MG: 5 SOLUTION ORAL at 12:27

## 2017-07-06 RX ADMIN — LIDOCAINE 2 PATCH: 50 PATCH CUTANEOUS at 21:20

## 2017-07-06 RX ADMIN — OXYCODONE HYDROCHLORIDE 10 MG: 5 SOLUTION ORAL at 08:24

## 2017-07-06 RX ADMIN — Medication 250 MG: at 21:15

## 2017-07-06 RX ADMIN — DIPHENHYDRAMINE HYDROCHLORIDE 25 MG: 25 CAPSULE ORAL at 21:07

## 2017-07-06 RX ADMIN — SIMETHICONE 40 MG: 20 SUSPENSION/ DROPS ORAL at 17:28

## 2017-07-06 ASSESSMENT — PAIN DESCRIPTION - DESCRIPTORS: DESCRIPTORS: ACHING

## 2017-07-06 NOTE — UTILIZATION REVIEW
I met with patient to explain the Medicare Notification of Non-Coverage (NOMNC) form. The patient acknowledged understanding and signed the form. A copy was made and given to patient.  Patient requested discharge earlier than 7/17/2017, originally projected.

## 2017-07-06 NOTE — UTILIZATION REVIEW
Pain Interview  DC    1. Have you had pain or hurting at any time in the last 5 days?  Yes  2. How much of the time have you experienced pain or hurting over the last 5 days? Almost Constantly  3. Over the past 5 days, has pain made it hard for you to sleep at night?  Yes  4. Over the past 5 days, have you limited your day-to-day activities because of pain?Yes  5. Rate pain intensity: Numeric 8

## 2017-07-06 NOTE — PLAN OF CARE
Problem: Goal Outcome Summary  Goal: Goal Outcome Summary  Outcome: No Change  Pt takes PRN oxycodone, lidoderm and fentanyl patches for pain management. Independent in room. Tube feeding ended at 2100 and is to be restarted at 0500 per pt request. Rate increased from 45mL/hr to 50mL/hr per orders. Orders for TPN and lipids D/C. PICC line dressing changed per protocol. Neck drain flushed with 30mL and cleaned with hibiclens. Drain output 150mL. Up to bedside commode multiple times with diarrhea. NPO and ice chip diet with tube feedings during the day. On sternal precautions.

## 2017-07-06 NOTE — PLAN OF CARE
Problem: Goal Outcome Summary  Goal: Goal Outcome Summary  Outcome: Improving  RN: L neck pain comfortably managed with Roxicodone 10 mg sol via J-tube x 1 so far this shift. Up I to BSC. Had mixed stool and urine x 1. Requesting to start TF at 0500. Claimed she's been tolerating Tf and H2o flushes at goal rates. Pharyngostomy tube on her L neck in place and connected to low intermittent suction. Will irrigate with NS per order with next encounter. Green output noted. Appear to be sleeping/resting between meds/cares. Using call light appropriately. Continue with plan of care.    0700: Pt c/o pain around pharyngostomy tube site. Area slightly red with suture slightly pulling off the skin. There is a hard tender spot on top as well. Note left for the provider to assess per pt request.

## 2017-07-06 NOTE — DISCHARGE INSTRUCTIONS
[facility] to supply your home low intermittent suction machine and supplies [phone]    Pharyngostomy to low intermittent suction    [facility] supplies your tube feeding formula and supplies [phone]

## 2017-07-06 NOTE — PLAN OF CARE
"Problem: Goal Outcome Summary  Goal: Goal Outcome Summary  RN: using scheduled and prn pain meds and effective. Fentanyl patch to decrease in dose with next patch change to 75 mcg. Pt asked no disturbing her between 1400 and 1600 today, left neck drain not flushed shift end. Cannister changed 1400. \"I want to sleep\". Continue sternal precautions. Tube feed running at 50cc/hour and pt request turn down to 40 cc/hour at 1400, and done, continue to monitor. Tincture of opium given along with scheduled bowel meds to stop liquid diarrhea, continue to monitor. Continues NPO with ice chips. Plans to discharge to home tomorrow.       "

## 2017-07-06 NOTE — OP NOTE
Surgeon / Clinician: Nalini Barreto MD    Preoperative Diagnosis:  Anastomotic leak status post esophagectomy status post stent placement.    Postoperative Diagnosis:  Anastomotic leak status post esophagectomy status post stent placement.     Procedure Performed:  Esophagogastrojejunostomy with removal and replacement of esophageal stent and dressing change of chest wound.     Surgeon:  Dr. Nalini Barreto.    Assistant Surgeon:  Dr. Viki Carmen    Operative Findings:  Persistent defect at anastomotic site, however, much smaller than previously noted.     Details of Procedure:  After obtaining informed consent, Sheree Blanco was brought to the operating room, laid supine on the operating table after induction of general anesthesia and induction of an endotracheal tube and a gastroscope was passed orally.  We were able to pass through the stent that was already in place and into the conduit.  There was no leak with the stent in place.  After this with fluoroscopic guidance the stent was removed and the esophagus and the anastomosis were reexamined again.  It was clearly visualized that there was a persistent defect.  In fact, on insufflation there was some bubbling of air through the chest wound.  At this point we decided to replace the stent with fluoroscopic guidance.  A 19 x 120 EndoMaxx stent was deployed to cover this anastomotic area where the defect was.  A repeat endoscopy and fluoroscopic imaging showed the stent to be in place but no leak.  We then proceeded to change the dressing in the right chest wound.  The patient tolerated the procedure well.         Nalini Barreto MD    D:  07/05/2017 18:31 T:  07/06/2017 09:05  Document:  [id number] MR\CG    Incorrect Encounter, [facility]/7/10/17

## 2017-07-06 NOTE — PROGRESS NOTES
Met with patient to update the DC planning discussion. Patient is now off of TPN, is at goal rate for enteral feedings. If she continues to tolerate enteral feedings and remains medically stable, she may DC tomorrow 7/7. Her  can transport, planning on late morning to early afternoon. Spoke to Jared at [facility], faxed clinical info and order for low intermittent gastric suction (for pharyngostomy) for home use. Updated [facility] and [facility] on patient status and DC plan. Will have TCU nursing review chest wound care with patient prior to DC.

## 2017-07-06 NOTE — DISCHARGE SUMMARY
[facility] ([facility])    Internal Medicine Discharge Summary- Gold Service    Date of Admission:  6/29/2017  Date of Discharge:  7/7/17. Date of service 7/6/17.   Discharging Provider: Wendy Storey    Discharge Diagnoses   Anastomotic leak with mediastinal abscess  Physical deconditioning  Acute pain  Severe malnutrition  Diarrhea  Hx of post-op atrial fibrillation    Hospital Course   Minerva is a 72 year old female with PMH of atrial fibrillation, breast cancer s/p lumpectomy 2007, fibromyalgia, GERD, IBS, meniere's disease, MS and symptomatic hiatal hernia. S/p Toupet fundoplication 1/2016 c/b esophageal perforation with mediastinal phlegmon s/p initial proximal gastrectomy, distal esophagectomy, spit fistula creation, left thoracotomy with mediastinal phlegmon excision on 1/9/2017. Since that time, she has undergone mulptiple procedures for management including esophageal dilations, stent placement/removal, washouts, lysis of adhesions, jejunostomy feeding tube placement, retrosternal gastric pull-through, resection of left half of the manubrium, spit fistula takedown, right tube thoracostomy and pharyngostomy tube placement. She most recently was admitted 5/18 and diagnosed with anastomic leak with mediastical abscess. S/p mediastinal wound debridement with chest tube placement (5/19) with subsequent pharyngostomy tube 5/31, stent placement 6/4 with exchange 6/27 and multiple washouts (6/2, 6/4, 6/9, 6/14, 6/27). Hospital course complicated by diarrhea (r/t TFs), surgical pain (seen by palliative). Transferred to TCU 6/29/17 for further rehab needs and attempts to advance tolerance to TFs. TFs were advanced to goal on 7/5 and TPN was stopped. Patient was discharged home 7/7/17 with plan to return to hospital on 7/17 for washout.    The following problems were addressed during her hospitalization:    Anastomotic leak with mediastinal abscess. S/p mediastinal wound debridement with chest  "tube placement (5/19) with subsequent pharyngostomy tube 5/31, stent placement 6/4 with exchange 6/27 and multiple washouts (6/2, 6/4, 6/9, 6/14, 6/27). No antibiotic or antifungal needs. S/p wound cultures with normal autumn, candida albicans growth. Blood cultures NG. Afebrile. No leukocytosis. Still with sternal wound requiring daily dressing changes.   - Continue pharyngostomy tube to LIS. Irrigate with 30cc q 8 hours.   - Continue NPO status with TF, FW flush support.  - Continue protonix 40mg daily   - Continue oral cares with peridex TID swish and spit  - Wound care: R chest wound --> daily: cleanse with Microlens pack with dry unfolded 2x2\" gauze apply No sting to surrounding skin, secure with 4x4's  - Discharge back to [facility] for EGD and washout 7/17     Chronic pain. In setting of above and possible underlying diagnosis of fibromyalgia. Palliative was previously following and recommended a pain regimen of fentanyl 100mcg q72h with oxycodone 10-20mg q3h prn and lidoderm patches. In addition to scheduled tylenol and gabapentin. Patients pain was well controlled on this regimen while at TCU. Pailiative recommended decreasing fentanyl patch dosing starting on 6/30, but given patient was having some increased pain during this time, this was delayed. Lidoderm patches stopped on discharge d/t patient being unable to afford prescription. Discharged to continue: tylenol 975mg TID, gabapentin 300mg q8h, fentanyl patch 75mcg q72h starting 7/8 (#5 patches), and prn oxycodone 10mg q4h prn (#60 tabs). Pending status following washout, surgery or PCP to address ongoing narcotic recs.     Severe malnutrition in the context of chronic illness. Transferred on TPN, TF combo 2/2 TF intolerance with inability to meet caloric needs. Continued on TPN with TFs until 7/5. Patient was agreeable to stop TPN on 7/5 and get to goal rate of 50cc/hr for 16hour cycle via TFs. She was able to tolerated this without abdominal pain, " nausea or vomiting. This meets 100% of her needs. Will continue on TFs on discharge. PICC line pulled 7/6 as TPN was stopped and this places her at higher risk of infection.     Diarrhea 2/2 TFs with h/o IBS. C Diff neg 5/27. Stool culture neg 6/30. CMV not detected 6/30. Pancreatic elastase 163 (mild to mod). Patient was continued on lomotil QID, imodium QID, florastor BID, fiber TID, and cholestyramine. She continues to have about 5 episodes of diarrhea daily, but now is at goal rate of TFs with free water flushes making up for GI losses. Will continue with lomotil QID, imodium QID, culturelle instead of florastor, fiber, and cholestyramine on discharge. Patient also requested short supply of tincture of opium on discharge, this has been very helpful but unfortunately is not covered by insurance.       Hx of Post op Atrial fibrillation. CHADsVASC2. Previously on coumadin, held 2/2 surgical interventions. Per patient, A.Fib x 1 assoc with surgery.  ECHO 1/2017 with EF >70%, mild pulmonary HTN, biatrial enlargement ,mild mitral valve annular calcification and mild aortic valve sclerosis. Has been on metoprolol for rate control (no hx of HTN).  EKG 7/2 NSR. BPs normotensive. Patient prefers to go off metoprolol, but given upcoming washout there is risk for post-op atrial fibrillation and her pressures and HR are well controlled on metoprolol 12.5mg BID. Decided to continue metoprolol 12.5mg BID. Patient opted to stop lovenox at this time. She is understanding of risk of stroke, but would prefer to eventually do a holter monitor to see how frequently she may have a.fib and then consider from there. PCP will need to follow up on the above and consider holter (preferrably zio patch).      Physical deconditioning. 2/2 complicated hospital/surgical course and underlying MS. PT,OT completed. Not on MS pharmacologic therapies prior. No need for home PT/OT.     Consultations This Hospital Stay    Gastroenterology  Palliative  Pain service  Thoracic surgery - peripherally  Nutrition    Code Status   Full Code    Time Spent on this Encounter   I, Wendy Lion Cordelia, personally saw the patient today and spent greater than 30 minutes discharging this patient.       Wendy Storey  Internal Medicine Staff Hospitalist Service  [facility]  Pager: [phone]  ______________________________________________________________________    Physical Exam   Vital Signs: Temp: 96.3  F (35.7  C) Temp src: Oral BP: 116/44 Pulse: 90 Heart Rate: 74 Resp: 16 SpO2: 98 % O2 Device: None (Room air)    Weight: 91 lbs 11.2 oz    General Appearance: NAD, slightly anxious appearing, moving around room, pleasant and coversant  Respiratory: CTAB  HEENT: pharyngostomy tube on L side of neck without any drainage, redness, fluctuance. Secured with suture.   Cardiovascular: RRR  GI: soft, nt,nd  Skin: chest wound x 2 c/d/i. Bandage over sites.     Primary Care Physician   Andrea Nino    Discharge Disposition   Discharged to home  Condition at discharge: Stable    Discharge Orders     Home care nursing referral     Reason for your hospital stay   Minerva is a 72 year old female with PMH of atrial fibrillation, breast cancer s/p lumpectomy 2007, fibromyalgia, GERD, IBS, meniere's disease, MS and symptomatic hiatal hernia. S/p Toupet fundoplication 1/2016 c/b esophageal perforation with mediastinal phlegmon s/p initial proximal gastrectomy, distal esophagectomy, spit fistula creation, left thoracotomy with mediastinal phlegmon excision on 1/9/2017. Since that time, she has undergone mulptiple procedures for management including esophageal dilations, stent placement/removal, washouts, lysis of adhesions, jejunostomy feeding tube placement, retrosternal gastric pull-through, resection of left half of the manubrium, spit fistula takedown, right tube thoracostomy and pharyngostomy tube placement. She most recently  "was admitted 5/18 and diagnosed with anastomic leak with mediastical abscess. S/p mediastinal wound debridement with chest tube placement (5/19) with subsequent pharyngostomy tube 5/31, stent placement 6/4 with exchange 6/27 and multiple washouts (6/2, 6/4, 6/9, 6/14, 6/27). Hospital course complicated by diarrhea (r/t TFs), surgical pain (seen by palliative). Transferred to TCU 6/29/17 for further rehab needs and attempts to advance tolerance to TFs. TFs were advanced to goal on 7/5 and TPN was stopped. Patient was discharged home 7/7/17 with plan to return to hospital on 7/17 for washout.     Adult [facility] Follow-up and recommended labs and tests   Continue to follow up with thoracic surgery as planned, you have a wash out scheduled for 7/17/17    Follow up with primary care provider or pain/palliative provider to discuss tapering your narcotics    Appointments on [place] and/or [facility] (with [facility] or [facility] provider or service). Call [phone] if you haven't heard regarding these appointments within 7 days of discharge.     Activity   Your activity upon discharge: activity as tolerated. No driving while on narcotics.     When to contact your care team   Please contact your primary care provider or seek medical care if you develop chest pain, shortness of breath, fever >101F, chills, abdominal pain,or if any other concerns arise.     Wound care and dressings   Instructions to care for your wound at home: continue hibiclens to pharyngostomy twice daily    Wound care:  R chest wound:  daily:  cleanse with Microlens pack with dry unfolded 2x2\" gauze apply No sting to surrounding skin, secure with 4x4's     Tubes and drains   You are going home with the following tubes or drains: pharyngostomy tube, continue to low intermittent suction, irrigate with 30cc every 8 hours.     Discharge Instructions   - Your fentanyl patch will be decreased to 75mcg/hr starting on 7/8, you will need to follow with your PCP or " "pain clinic or surgery for further tapering of your pain medications on discharge.  - Wound care:  R chest wound:  daily:  cleanse with Microlens pack with dry unfolded 2x2\" gauze apply No sting to surrounding skin, secure with 4x4's     MD face to face encounter   Documentation of Face to Face and Certification for Home Health Services    I certify that patient: Minerva Blanco is under my care and that I, or a nurse practitioner or physician's assistant working with me, had a face-to-face encounter that meets the physician face-to-face encounter requirements with this patient on: 7/6/2017.    This encounter with the patient was in whole, or in part, for the following medical condition, which is the primary reason for home health care: esophageal perforation with multiple complications.     I certify that, based on my findings, the following services are medically necessary home health services: Nursing.For complex aftercare of surgical procedures because the patient needs instruction and cannot perform care on their own due to complexity: pharyngostomy tube, ongoing daily wound care, assistance with TF.     My clinical findings support the need for the above services because: Nurse is needed: .    Further, I certify that my clinical findings support that this patient is homebound (i.e. absences from home require considerable and taxing effort and are for medical reasons or [employer] services or infrequently or of short duration when for other reasons) because: Leaving home is medically contraindicated for the following reason(s): pharyngostomy tube has to be to low intermittent suction continuously.     Based on the above findings. I certify that this patient is confined to the home and needs intermittent skilled nursing care, physical therapy and/or speech therapy.  The patient is under my care, and I have initiated the establishment of the plan of care.  This patient will be followed by a physician who will " periodically review the plan of care.  Physician/Provider to provide follow up care: Andrea Nino    Attending hospital physician (the Medicare certified [facility] provider): Miroslava Santa MD  Physician Signature: See electronic signature associated with these discharge orders.  Date: 7/6/2017     Full Code     Diet   Follow this diet upon discharge: Strict NPO.     Adult Formula Drip Feeding: Specified Time TwoCal HN; Jejunostomy; Goal Rate: 50; mL/hr; From: 6:00 AM; 10:00 PM; Medication - Tube Feeding Flush Frequency: At least 15-30 mL water before and after medication administration and with tube clogging    Free water: 75cc every 2 hours       Discharge Medications   Current Discharge Medication List      START taking these medications    Details   acetaminophen (TYLENOL) 32 mg/mL solution 30.45 mLs (975 mg) by Per Feeding Tube route 3 times daily  Qty: 300 mL    Associated Diagnoses: Esophageal perforation      chlorhexidine (PERIDEX) 0.12 % solution Swish and spit 15 mLs in mouth 2 times daily    Associated Diagnoses: Esophageal perforation      fentaNYL (DURAGESIC) 75 mcg/hr 72 hr patch Place 1 patch onto the skin every 72 hours  Qty: 5 patch, Refills: 0    Associated Diagnoses: Esophageal perforation; Acute post-operative pain         CONTINUE these medications which have CHANGED    Details   gabapentin (NEURONTIN) 250 MG/5ML solution Take 6 mLs (300 mg) by mouth every 8 hours  Qty: 550 mL, Refills: 0    Associated Diagnoses: Acute post-operative pain      metoprolol (LOPRESSOR) 10 mg/mL SUSP 1.25 mLs (12.5 mg) by Per J Tube route 2 times daily    Associated Diagnoses: Atrial fibrillation, unspecified type (H)      opium tincture 10 MG/ML (1%) liquid Take 0.6 mLs (6 mg) by mouth every 6 hours as needed for diarrhea or moderate pain  Qty: 118 mL, Refills: 0    Associated Diagnoses: Bowel habit changes      oxyCODONE (ROXICODONE) 10 MG IR tablet Take 1 tablet (10 mg) by mouth every 4 hours as needed for  moderate to severe pain  Qty: 60 tablet, Refills: 0    Associated Diagnoses: Acute post-operative pain         CONTINUE these medications which have NOT CHANGED    Details   chlorhexidine (HIBICLENS) 4 % liquid Apply topically 2 times daily  Qty: 200 mL, Refills: 0    Associated Diagnoses: History of esophagectomy      simethicone (MYLICON) 40 MG/0.6ML suspension 0.6 mLs (40 mg) by Per Feeding Tube route 4 times daily    Associated Diagnoses: History of esophagectomy      diphenoxylate-atropine (LOMOTIL) 0.5-0.005 mg/mL liquid Take 5 mLs by mouth 4 times daily as needed for diarrhea  Qty: 300 mL, Refills: 0    Associated Diagnoses: Bowel habit changes      fiber modular (NUTRISOURCE FIBER) packet 1 packet by Per Feeding Tube route 3 times daily (with meals)  Qty: 60 packet, Refills: 0    Associated Diagnoses: History of esophagectomy      ferrous sulfate 300 (60 FE) MG/5ML syrup 5 mLs (300 mg) by Per J Tube route daily  Qty: 150 mL, Refills: 0    Associated Diagnoses: Anemia due to other cause      traZODone (DESYREL) 100 MG tablet 0.5 tablets (50 mg) by Per G Tube route At Bedtime  Qty: 30 tablet, Refills: 0    Associated Diagnoses: Insomnia, unspecified type      ranitidine (ZANTAC) 150 MG/10ML syrup Take 10 mLs (150 mg) by mouth 2 times daily  Qty: 600 mL, Refills: 0    Associated Diagnoses: Hx of esophagectomy      !! order for DME Equipment being ordered:   [facility] [place]Intermittent  Suction Canisters(2)  Suction Canister Holders(2)  Suction Connect Tube(2)  5 in 1 Connector(2)  Bacteria Filter(2)  Yaunkauer Suction(2)    Treatment Diagnosis: Esophogeal Perforation, s/p esophagectomy  Qty: 1 Device, Refills: 0    Associated Diagnoses: Hx of esophagectomy      !! order for DME Equipment being ordered:   Suction Pump  Suction Canister(2)  Suction Tubing(2)  Bacteria Filter(2)  Yaunkauer(4)  Red Rubber Catheter(2)    Treatment Diagnosis: Esophogeal Perforation, s/p esophagectomy  Qty: 1 Device, Refills: 0     Associated Diagnoses: Esophageal perforation      DiphenhydrAMINE HCl (BENADRYL PO) Take 25 mg by mouth nightly as needed      cholestyramine (QUESTRAN) 4 G Packet Take 1 packet by mouth daily      multivitamins with minerals (CERTAVITE/CEROVITE) LIQD liquid Take 15 mLs by mouth daily      loperamide (IMODIUM) 1 MG/5ML liquid Take 20 mLs (4 mg) by mouth 4 times daily as needed for diarrhea  Qty: 200 mL    Associated Diagnoses: Bowel habit changes      Lactobacillus Rhamnosus, GG, (CULTURELLE PO) Take 1 tablet by mouth 2 times daily        !! - Potential duplicate medications found. Please discuss with provider.      STOP taking these medications       enoxaparin (LOVENOX) 40 MG/0.4ML injection Comments:   Reason for Stopping:         oxyCODONE (ROXICODONE) 5 MG/5ML solution Comments:   Reason for Stopping:             Allergies   Allergies   Allergen Reactions     Amoxicillin Diarrhea     Ativan [Lorazepam] Other (See Comments)     Hallucinations     Hydromorphone Itching     4/12/17 - patient open to using this as she tolerated Hydromorphone PCA during hospitalization in January 2017.      Morphine Itching

## 2017-07-07 ENCOUNTER — HOME INFUSION (PRE-WILLOW HOME INFUSION) (OUTPATIENT)
Dept: PHARMACY | Facility: CLINIC | Age: 72
End: 2017-07-07

## 2017-07-07 VITALS
RESPIRATION RATE: 16 BRPM | SYSTOLIC BLOOD PRESSURE: 114 MMHG | WEIGHT: 88.8 LBS | HEIGHT: 60 IN | HEART RATE: 74 BPM | TEMPERATURE: 96.1 F | DIASTOLIC BLOOD PRESSURE: 51 MMHG | BODY MASS INDEX: 17.43 KG/M2 | OXYGEN SATURATION: 98 %

## 2017-07-07 LAB
ANION GAP SERPL CALCULATED.3IONS-SCNC: 7 MMOL/L (ref 3–14)
BUN SERPL-MCNC: 18 MG/DL (ref 7–30)
CALCIUM SERPL-MCNC: 8.1 MG/DL (ref 8.5–10.1)
CHLORIDE SERPL-SCNC: 104 MMOL/L (ref 94–109)
CO2 SERPL-SCNC: 29 MMOL/L (ref 20–32)
CREAT SERPL-MCNC: 0.4 MG/DL (ref 0.52–1.04)
ERYTHROCYTE [DISTWIDTH] IN BLOOD BY AUTOMATED COUNT: 15.2 % (ref 10–15)
GFR SERPL CREATININE-BSD FRML MDRD: ABNORMAL ML/MIN/1.7M2
GLUCOSE SERPL-MCNC: 96 MG/DL (ref 70–99)
HCT VFR BLD AUTO: 31.1 % (ref 35–47)
HGB BLD-MCNC: 9.4 G/DL (ref 11.7–15.7)
MAGNESIUM SERPL-MCNC: 1.8 MG/DL (ref 1.6–2.3)
MCH RBC QN AUTO: 28.7 PG (ref 26.5–33)
MCHC RBC AUTO-ENTMCNC: 30.2 G/DL (ref 31.5–36.5)
MCV RBC AUTO: 95 FL (ref 78–100)
PHOSPHATE SERPL-MCNC: 3 MG/DL (ref 2.5–4.5)
PLATELET # BLD AUTO: 343 10E9/L (ref 150–450)
POTASSIUM SERPL-SCNC: 3.9 MMOL/L (ref 3.4–5.3)
RBC # BLD AUTO: 3.28 10E12/L (ref 3.8–5.2)
SODIUM SERPL-SCNC: 140 MMOL/L (ref 133–144)
WBC # BLD AUTO: 5.8 10E9/L (ref 4–11)

## 2017-07-07 PROCEDURE — 25000132 ZZH RX MED GY IP 250 OP 250 PS 637: Mod: GY | Performed by: PHYSICIAN ASSISTANT

## 2017-07-07 PROCEDURE — 84100 ASSAY OF PHOSPHORUS: CPT | Performed by: NURSE PRACTITIONER

## 2017-07-07 PROCEDURE — A9270 NON-COVERED ITEM OR SERVICE: HCPCS | Mod: GY | Performed by: PHYSICIAN ASSISTANT

## 2017-07-07 PROCEDURE — 25000132 ZZH RX MED GY IP 250 OP 250 PS 637: Mod: GY | Performed by: INTERNAL MEDICINE

## 2017-07-07 PROCEDURE — A9270 NON-COVERED ITEM OR SERVICE: HCPCS | Mod: GY | Performed by: NURSE PRACTITIONER

## 2017-07-07 PROCEDURE — 83735 ASSAY OF MAGNESIUM: CPT | Performed by: NURSE PRACTITIONER

## 2017-07-07 PROCEDURE — A9270 NON-COVERED ITEM OR SERVICE: HCPCS | Mod: GY | Performed by: INTERNAL MEDICINE

## 2017-07-07 PROCEDURE — 36592 COLLECT BLOOD FROM PICC: CPT | Performed by: NURSE PRACTITIONER

## 2017-07-07 PROCEDURE — 85027 COMPLETE CBC AUTOMATED: CPT | Performed by: NURSE PRACTITIONER

## 2017-07-07 PROCEDURE — 80048 BASIC METABOLIC PNL TOTAL CA: CPT | Performed by: NURSE PRACTITIONER

## 2017-07-07 PROCEDURE — 25000125 ZZHC RX 250: Performed by: NURSE PRACTITIONER

## 2017-07-07 PROCEDURE — 25000132 ZZH RX MED GY IP 250 OP 250 PS 637: Mod: GY | Performed by: NURSE PRACTITIONER

## 2017-07-07 RX ADMIN — CHOLESTYRAMINE 4 G: 4 POWDER, FOR SUSPENSION ORAL at 09:53

## 2017-07-07 RX ADMIN — METOPROLOL TARTRATE 12.5 MG: 100 TABLET, FILM COATED ORAL at 09:54

## 2017-07-07 RX ADMIN — Medication 2 PACKET: at 11:47

## 2017-07-07 RX ADMIN — SODIUM CHLORIDE, PRESERVATIVE FREE 5 ML: 5 INJECTION INTRAVENOUS at 07:16

## 2017-07-07 RX ADMIN — GABAPENTIN 300 MG: 250 SOLUTION ORAL at 05:06

## 2017-07-07 RX ADMIN — OXYCODONE HYDROCHLORIDE 10 MG: 5 SOLUTION ORAL at 07:56

## 2017-07-07 RX ADMIN — SIMETHICONE 40 MG: 20 SUSPENSION/ DROPS ORAL at 09:52

## 2017-07-07 RX ADMIN — OXYCODONE HYDROCHLORIDE 10 MG: 5 SOLUTION ORAL at 03:02

## 2017-07-07 RX ADMIN — ACETAMINOPHEN 975 MG: 160 SUSPENSION ORAL at 07:56

## 2017-07-07 RX ADMIN — CYCLOBENZAPRINE HYDROCHLORIDE 10 MG: 5 TABLET, FILM COATED ORAL at 05:11

## 2017-07-07 RX ADMIN — Medication 10 ML: at 09:52

## 2017-07-07 RX ADMIN — Medication 2 PACKET: at 07:58

## 2017-07-07 RX ADMIN — OXYCODONE HYDROCHLORIDE 15 MG: 5 SOLUTION ORAL at 11:08

## 2017-07-07 RX ADMIN — LOPERAMIDE HYDROCHLORIDE 4 MG: 1 SOLUTION ORAL at 09:52

## 2017-07-07 RX ADMIN — HYDROCORTISONE: 10 CREAM TOPICAL at 09:53

## 2017-07-07 RX ADMIN — Medication 250 MG: at 09:52

## 2017-07-07 RX ADMIN — MINERAL SUPPLEMENT IRON 300 MG / 5 ML STRENGTH LIQUID 100 PER BOX UNFLAVORED 300 MG: at 07:56

## 2017-07-07 RX ADMIN — CHLORHEXIDINE GLUCONATE 15 ML: 1.2 RINSE ORAL at 07:57

## 2017-07-07 NOTE — PLAN OF CARE
Problem: Goal Outcome Summary  Goal: Goal Outcome Summary  Outcome: Improving  RN: L neck pain comfortably managed with Roxicodone 10-15 mg sol via J-tube x 2 so far this shift. Up to BSC independently. Reported that watery stool has improved in terms of frequency. L neck pharyngostomy tube intact connected to low intermittent suction with green liquid output noted. Will irrigate tube with 30 cc NS and change stabilizing device (per pt preferred device) at 0500 per pt request. Tube feeding will also be started at that time. Pt informed that TF bag with flush bag is out stock and will be coming later in the day per [facility]. Nurses will have to come and flush her tube manually at 75 cc q 2H. Pt accepting. Appear to be sleeping/resting between cares/meds. For d/c today and very happy about it.Using call light appropriately. Continue with plan of care.    0700: Pt only tolerated 20 ml of NS flush to pharyngostomy tube. Pharyngostomy site cleansed with Chlorhexidine per order. Reported Bacitracin ointment being applied after but none available at this time and will have to order from [facility]. Claimed it's ok to skip for now. Pt would like to rest for now and would have the next Tf flush with AM meds. Will inform incoming nurse.

## 2017-07-07 NOTE — PROGRESS NOTES
Infusion Therapy-[facility] Nurse Liaison-Informational Meeting  Met with Patient at bedside. Pt is expected to D/C today. She has been on service with [facility] for Enteral Therapy prior to hospital stay She is independent with Enteral Therapy, and declines a SNV today.  Provided info on [facility] Services, Including-Administration methods, RN visits, RPH/RN on call 24/7, supplies, and delivery process.    Educated on how to contact [facility]. Pt in agreement with plan and willing and able to learn. Pt stated they are will be comfortable doing infusion in home environment.    Will continue to follow until DC for any changes or additional needs   SUPPLIES: NOT REQUIRED, PER PT  SNV: NOT REQUIRED TODAY FOR TEACHING    Yuly Lopez, [facility]-Nurse Liaison  Pager:  [phone]  Cell:  [phone]  Email to text:  [email or web address]  [email or web address]

## 2017-07-07 NOTE — PLAN OF CARE
Problem: Goal Outcome Summary  Goal: Goal Outcome Summary  Outcome: Improving  Patient is alert x 4 and she directs her cares and uses her call light appropriately. VSS. Patient turned off her TF at 1100 for discharge. Patient medicated with 15 mg of oxycodone at 1100 before discharge. I removed patient's picc and applied a pressure dressing . Patient tolerated the picc removal fine. Patient read over the discharge instructions and I went over all the medications. I sent a week home of supplies and  TF supplies. Patient appears ready to go home. Patient helped outside to her  vehicle and all belongings sent with her.

## 2017-07-07 NOTE — PLAN OF CARE
Problem: Goal Outcome Summary  Goal: Goal Outcome Summary  Outcome: No Change  Pt has plans to discharge tomorrow. Chest dressing changed and process reviewed with spouse. Neck drain area cleaned with hibiclens and flushed with 30mL. Drain output 300mL. Oxycodone given x2 with effectiveness. Tube feeding ran until 2100 at a rate of 50mL/hr. Continues on NPO and ice chip diet. Up to commode multiple times with diarrhea and gas.

## 2017-07-09 ENCOUNTER — HOME INFUSION (PRE-WILLOW HOME INFUSION) (OUTPATIENT)
Dept: PHARMACY | Facility: CLINIC | Age: 72
End: 2017-07-09

## 2017-07-10 ENCOUNTER — ANTICOAGULATION THERAPY VISIT (OUTPATIENT)
Dept: ANTICOAGULATION | Facility: CLINIC | Age: 72
End: 2017-07-10

## 2017-07-10 DIAGNOSIS — Z79.01 LONG-TERM (CURRENT) USE OF ANTICOAGULANTS: ICD-10-CM

## 2017-07-10 DIAGNOSIS — I48.91 ATRIAL FIBRILLATION, UNSPECIFIED TYPE (H): ICD-10-CM

## 2017-07-10 NOTE — OP NOTE
Surgeon / Clinician: Nalini Barreto MD    Preoperative Diagnosis:  Anastomotic leak status post esophagectomy status post stent placement.    Postoperative Diagnosis:  Anastomotic leak status post esophagectomy status post stent placement.     Procedure Performed:  Esophagogastrojejunostomy with removal and replacement of esophageal stent and dressing change of chest wound.     Surgeon:  Dr. Nalini Barreto.    Assistant Surgeon:  Dr. Viki Carmen    Operative Findings:  Persistent defect at anastomotic site, however, much smaller than previously noted.     Details of Procedure:  After obtaining informed consent, Sheree Blanco was brought to the operating room, laid supine on the operating table after induction of general anesthesia and induction of an endotracheal tube and a gastroscope was passed orally.  We were able to pass through the stent that was already in place and into the conduit.  There was no leak with the stent in place.  After this with fluoroscopic guidance the stent was removed and the esophagus and the anastomosis were reexamined again.  It was clearly visualized that there was a persistent defect.  In fact, on insufflation there was some bubbling of air through the chest wound.  At this point we decided to replace the stent with fluoroscopic guidance.  A 19 x 120 EndoMaxx stent was deployed to cover this anastomotic area where the defect was.  A repeat endoscopy and fluoroscopic imaging showed the stent to be in place but no leak.  We then proceeded to change the dressing in the right chest wound.  The patient tolerated the procedure well.         Nalini Barreto MD    D:  07/05/2017 18:31 T:  07/06/2017 09:05  Document:  [id number] MR\CG

## 2017-07-10 NOTE — PROGRESS NOTES
"Patient has been in the [facility] until 7/7 when she was discharged.  Lovenox was discontinued on 7/6, (See note by PA on 7/6) and patient has not been on coumadin during TCU stay.  She is to have a \"washout\" on 7/17, and meds will be re-evaluated after .  At this time I will inactivate patient from our clinic.    "

## 2017-07-10 NOTE — MR AVS SNAPSHOT
Minerva JIMENEZ Francis   7/10/2017   Anticoagulation Therapy Visit    Description:  72 year old female   Provider:  Gloria Richardson RN   Department:  [facility]           INR as of 7/10/2017     Today's INR       Anticoagulation Summary as of 7/10/2017     INR goal 2.0-3.0   Today's INR    Next INR check     Indications   Long-term (current) use of anticoagulants [Z79.01] [Z79.01]  Atrial fibrillation (H) [I48.91] [I48.91]         May 2017 Details    Sun Mon Tue Wed Thu Fri Sat      1               2               3               4               5               6                 7               8               9               10               11               12               13                 14               15               16               17      Hold   See details      18      Hold         19            20                 21               22               23               24               25               26               27                 28               29               30               31                   Date Details   05/17 This INR check       Date of next INR:  5/19/2017         How to take your warfarin dose     Hold Do not take your warfarin dose. See the Details table to the right for additional instructions.

## 2017-07-12 ENCOUNTER — OFFICE VISIT (OUTPATIENT)
Dept: SURGERY | Facility: CLINIC | Age: 72
End: 2017-07-12
Attending: THORACIC SURGERY (CARDIOTHORACIC VASCULAR SURGERY)
Payer: MEDICARE

## 2017-07-12 VITALS
BODY MASS INDEX: 17.52 KG/M2 | SYSTOLIC BLOOD PRESSURE: 131 MMHG | TEMPERATURE: 97.8 F | HEART RATE: 78 BPM | RESPIRATION RATE: 16 BRPM | WEIGHT: 89.7 LBS | DIASTOLIC BLOOD PRESSURE: 74 MMHG | OXYGEN SATURATION: 99 %

## 2017-07-12 DIAGNOSIS — K22.2 GERD WITH STRICTURE: ICD-10-CM

## 2017-07-12 DIAGNOSIS — K91.89 ESOPHAGECTOMY, ANASTOMOTIC LEAK: ICD-10-CM

## 2017-07-12 DIAGNOSIS — K21.9 GERD WITH STRICTURE: ICD-10-CM

## 2017-07-12 PROCEDURE — 99212 OFFICE O/P EST SF 10 MIN: CPT | Mod: ZF

## 2017-07-12 RX ORDER — CLOTRIMAZOLE 1 %
CREAM (GRAM) TOPICAL 2 TIMES DAILY
Qty: 12 G | Refills: 1 | Status: ON HOLD | OUTPATIENT
Start: 2017-07-12 | End: 2017-10-20

## 2017-07-12 ASSESSMENT — PAIN SCALES - GENERAL: PAINLEVEL: NO PAIN (0)

## 2017-07-12 NOTE — NURSING NOTE
Oncology Rooming Note    July 12, 2017 9:12 AM   Minerva Blanco is a 72 year old female who presents for:    Chief Complaint   Patient presents with     Oncology Clinic Visit     Wound check     Initial Vitals: /74  Pulse 78  Temp 97.8  F (36.6  C) (Oral)  Resp 16  Wt 40.7 kg (89 lb 11.2 oz)  SpO2 99%  BMI 17.52 kg/m2 Estimated body mass index is 17.52 kg/(m^2) as calculated from the following:    Height as of 6/29/17: 1.524 m (5').    Weight as of this encounter: 40.7 kg (89 lb 11.2 oz). Body surface area is 1.31 meters squared.  No Pain (0) Comment: Data Unavailable   No LMP recorded. Patient is postmenopausal.  Allergies reviewed: Yes  Medications reviewed: Yes    Medications: Medication refills not needed today.  Pharmacy name entered into [employer]:    [id number] - [place], [place] - [address] [facility] [place], [place] - [address]    Clinical concerns:      6 minutes for nursing intake (face to face time)     Mary Horne CMA

## 2017-07-12 NOTE — PROGRESS NOTES
THORACIC SURGERY FOLLOW UP VISIT     Dear Dr. Carroll,  I saw . Minerva Blanco in follow-up today. The clinical summary follows:      PREOP DIAGNOSIS   1.  Chronic esophageal perforation with mediastinal phlegmon.    2.  Status post fundoplication.        PROCEDURE   1/9/2017  1.  Esophagogastroscopy.    2.  Laparoscopic lysis of adhesions.    3.  Laparoscopic proximal gastrectomy and gastrostomy tube placement.    4.  Left thoracotomy with excision of mediastinal phlegmon and distal esophagectomy.    5.  Thoracic duct ligation.    6.  Right tube thoracostomy.    7.  Left esophageal spit fistula.    8.  Bronchoscopy.      6/9/2017  1. Esophagogastroscopy  2. Esophageal stent placement      PRIOR PROCEDURES  1) Multiple endoscopies and pharyngostomy tube placement x 2 (for drainage of paraesophageal cavity)  2) Esophageal stent placement, attempted placement of biliary stent into the paraesophageal cavity (7/22/2016)  3) Esophageal stent removal, placement of retrograde gastroesophageal tube (10/23/2016)  4) Toupet fundoplication (1/2016)        INTERVAL STUDIES  None      TOB never  ETOH negative      SUBJECTIVE  She returns to have her wound evaluated. She was concerned that it was moist and red. She denies fevers or chills  On exam, her wound has healthy granulation tissue with fibrinous exudate, minimal surrounding irritation with possible fungal infection      From a personal perspective, she is here with her  Enrique. She recently went home from rehab.         IMPRESSION   (T81.4XXD) Wound abscess, subsequent encounter  (primary encounter diagnosis)  (K91.89) Esophagectomy, anastomotic leak  (K21.9,  K22.2) GERD with stricture        72 year-old female S/P retrosternal gastric pull-through complicated by anastomotic leak, now with esophageal stent        PLAN  I spent a total of 15 minutes with Ms. Minerva Blanco and Enrique, more than 50% of which were spent in counseling, coordination of care, and  face-to-face time. I reviewed the plan as follows:  1) Dressing Change  2) Topical Antifungal, Clotrimazole topical cream 1% BiD  3) Return next Monday for EGD   They had all their questions answered and were in agreement with the plan.  I appreciate the opportunity to participate in the care of your patient and will keep you updated.  Sincerely,

## 2017-07-12 NOTE — MR AVS SNAPSHOT
After Visit Summary   7/12/2017    Minerva Blanco    MRN: [id number]           Patient Information     Date Of Birth          1945        Visit Information        Provider Department      7/12/2017 9:00 AM Jens Wise MD [facility]        Today's Diagnoses     Wound abscess, subsequent encounter    -  1    Esophagectomy, anastomotic leak        GERD with stricture           Follow-ups after your visit        Your next 10 appointments already scheduled     Aug 02, 2017  1:45 PM CDT   (Arrive by 1:30 PM)   Return Visit with Jens Wise MD   [facility] ([facility])    [address]  2nd Floor  [facility] [id number] [phone]            Aug 04, 2017   Procedure with Nalini Barreto MD   [facility] [place] (--)    [address]  [facility] [address]   [phone]            Aug 28, 2017  3:30 PM CDT   (Arrive by 3:15 PM)   New Patient Visit with Richard Vigil MD   [facility] ([facility])    [address] [place] [address]  4th Floor  [facility] [id number] [phone]              Who to contact     If you have questions or need follow up information about today's clinic visit or your schedule please contact [facility] directly at [phone].  Normal or non-critical lab and imaging results will be communicated to you by MyChart, letter or phone within 4 business days after the clinic has received the results. If you do not hear from us within 7 days, please contact the clinic through MyChart or phone. If you have a critical or abnormal lab result, we will notify you by phone as soon as possible.  Submit refill requests through [employer] or call your pharmacy and they will forward the refill request to us. Please allow 3 business days for your refill to be completed.           "Additional Information About Your Visit        MyChart Information     [employer] lets you send messages to your doctor, view your test results, renew your prescriptions, schedule appointments and more. To sign up, go to [email or web address] . Click on \"Log in\" on the left side of the screen, which will take you to the Welcome page. Then click on \"Sign up Now\" on the right side of the page.     You will be asked to enter the access code listed below, as well as some personal information. Please follow the directions to create your username and password.     Your access code is: [email or web address]  Expires: 2017 11:41 AM     Your access code will  in 90 days. If you need help or a new code, please call your [facility] clinic or [phone].        Care EveryWhere ID     This is your Care EveryWhere ID. This could be used by other organizations to access your [facility] medical records  [id number]        Your Vitals Were     Pulse Temperature Respirations Pulse Oximetry BMI (Body Mass Index)       78 97.8  F (36.6  C) (Oral) 16 99% 17.52 kg/m2        Blood Pressure from Last 3 Encounters:   17 119/70   17 109/62   17 140/61    Weight from Last 3 Encounters:   17 40.1 kg (88 lb 8 oz)   17 42.1 kg (92 lb 12.8 oz)   17 42.5 kg (93 lb 11.2 oz)              Today, you had the following     No orders found for display         Today's Medication Changes          These changes are accurate as of: 17 11:59 PM.  If you have any questions, ask your nurse or doctor.               Start taking these medicines.        Dose/Directions    clotrimazole 1 % cream   Commonly known as:  LOTRIMIN   Used for:  Wound abscess, subsequent encounter   Started by:  Jens Wise MD        Apply topically 2 times daily   Quantity:  12 g   Refills:  1         These medicines have changed or have updated prescriptions.        Dose/Directions    loperamide 1 MG/5ML liquid   Commonly known as:  " IMODIUM   This may have changed:  when to take this   Used for:  Bowel habit changes        Dose:  4 mg   Take 20 mLs (4 mg) by mouth 4 times daily as needed for diarrhea   Quantity:  200 mL   Refills:  0       traZODone 100 MG tablet   Commonly known as:  DESYREL   This may have changed:  how much to take   Used for:  Insomnia, unspecified type        Dose:  50 mg   0.5 tablets (50 mg) by Per G Tube route At Bedtime   Quantity:  30 tablet   Refills:  0            Where to get your medicines      These medications were sent to [place], [place] - [address]  [address], [facility] [address]    Hours:  TRANSPLANT PHONE NUMBER [phone] Phone:  [phone]     clotrimazole 1 % cream                Primary Care Provider Office Phone # Fax #    Andrea Nino [phone]       [facility] [address] [address]        Equal Access to Services     TOM JORGENSEN AH: Hadii davion ku hadasho Soomaali, waaxda luqadaha, qaybta kaalmada adeegyada, waxay yennyin hayazalian gayle amaya . So [facility] [phone].    ATENCIÓN: Si habla español, tiene a jackson disposición servicios gratuitos de asistencia lingüística. Llame al [phone].    We comply with applicable federal civil rights laws and [place] laws. We do not discriminate on the basis of race, color, national origin, age, disability sex, sexual orientation or gender identity.            Thank you!     Thank you for choosing [facility]  for your care. Our goal is always to provide you with excellent care. Hearing back from our patients is one way we can continue to improve our services. Please take a few minutes to complete the written survey that you may receive in the mail after your visit with us. Thank you!             Your Updated Medication List - Protect others around you: Learn how to safely use, store and throw away your medicines at  www.disposemymeds.org.          This list is accurate as of: 7/12/17 11:59 PM.  Always use your most recent med list.                   Brand Name Dispense Instructions for use Diagnosis    acetaminophen 32 mg/mL solution    TYLENOL    300 mL    30.45 mLs (975 mg) by Per Feeding Tube route 3 times daily    Esophageal perforation       BENADRYL PO      Take 25 mg by mouth nightly as needed        chlorhexidine 0.12 % solution    PERIDEX     Swish and spit 15 mLs in mouth 2 times daily    Esophageal perforation       chlorhexidine 4 % liquid    HIBICLENS    200 mL    Apply topically 2 times daily    History of esophagectomy       cholestyramine 4 G Packet    QUESTRAN     Take 1 packet by mouth daily        clotrimazole 1 % cream    LOTRIMIN    12 g    Apply topically 2 times daily    Wound abscess, subsequent encounter       CULTURELLE PO      Take 1 tablet by mouth 2 times daily        fentaNYL 75 mcg/hr 72 hr patch    DURAGESIC    5 patch    Place 1 patch onto the skin every 72 hours    Esophageal perforation, Acute post-operative pain       fiber modular packet     60 packet    1 packet by Per Feeding Tube route 3 times daily (with meals)    History of esophagectomy       gabapentin 250 MG/5ML solution    NEURONTIN    550 mL    Take 6 mLs (300 mg) by mouth every 8 hours    Acute post-operative pain       loperamide 1 MG/5ML liquid    IMODIUM    200 mL    Take 20 mLs (4 mg) by mouth 4 times daily as needed for diarrhea    Bowel habit changes       metoprolol 10 mg/mL Susp    LOPRESSOR     1.25 mLs (12.5 mg) by Per J Tube route 2 times daily    Atrial fibrillation, unspecified type (H)       multivitamins with minerals Liqd liquid      Take 15 mLs by mouth daily        opium tincture 10 MG/ML (1%) liquid     118 mL    Take 0.6 mLs (6 mg) by mouth every 6 hours as needed for diarrhea or moderate pain    Bowel habit changes       * order for DME     1 Device    Equipment being ordered:  Goo Suction  Machine-Intermittent Suction Canisters(2) Suction Canister Holders(2) Suction Connect Tube(2) 5 in 1 Connector(2) Bacteria Filter(2) Yaunkauer Suction(2)  Treatment Diagnosis: Esophogeal Perforation, s/p esophagectomy    Hx of esophagectomy       * order for DME     1 Device    Equipment being ordered:  Suction Pump Suction Canister(2) Suction Tubing(2) Bacteria Filter(2) Yaunkauer(4) Red Rubber Catheter(2)  Treatment Diagnosis: Esophogeal Perforation, s/p esophagectomy    Esophageal perforation       oxyCODONE 5 MG IR tablet    ROXICODONE    20 tablet    Take 1-2 tablets (5-10 mg) by mouth every 3 hours as needed for pain or other (Moderate to Severe)    Esophageal anastomotic leak       simethicone 40 MG/0.6ML suspension    MYLICON     0.6 mLs (40 mg) by Per Feeding Tube route 4 times daily    History of esophagectomy       sodium chloride (PF) 0.9% PF flush     1500 mL    30 mLs by Intracatheter route every 8 hours    Gastroesophageal reflux disease, esophagitis presence not specified       traZODone 100 MG tablet    DESYREL    30 tablet    0.5 tablets (50 mg) by Per G Tube route At Bedtime    Insomnia, unspecified type       * Notice:  This list has 2 medication(s) that are the same as other medications prescribed for you. Read the directions carefully, and ask your doctor or other care provider to review them with you.

## 2017-07-12 NOTE — LETTER
7/12/2017      RE: Minerva BARBARA Francis  [address]   [facility] [address]       THORACIC SURGERY FOLLOW UP VISIT     Dear Dr. Carroll,  I saw . Minerva Blanco in follow-up today. The clinical summary follows:      PREOP DIAGNOSIS   1.  Chronic esophageal perforation with mediastinal phlegmon.    2.  Status post fundoplication.        PROCEDURE   1/9/2017  1.  Esophagogastroscopy.    2.  Laparoscopic lysis of adhesions.    3.  Laparoscopic proximal gastrectomy and gastrostomy tube placement.    4.  Left thoracotomy with excision of mediastinal phlegmon and distal esophagectomy.    5.  Thoracic duct ligation.    6.  Right tube thoracostomy.    7.  Left esophageal spit fistula.    8.  Bronchoscopy.      6/9/2017  1. Esophagogastroscopy  2. Esophageal stent placement      PRIOR PROCEDURES  1) Multiple endoscopies and pharyngostomy tube placement x 2 (for drainage of paraesophageal cavity)  2) Esophageal stent placement, attempted placement of biliary stent into the paraesophageal cavity (7/22/2016)  3) Esophageal stent removal, placement of retrograde gastroesophageal tube (10/23/2016)  4) Toupet fundoplication (1/2016)        INTERVAL STUDIES  None      TOB never  ETOH negative      SUBJECTIVE  She returns to have her wound evaluated. She was concerned that it was moist and red. She denies fevers or chills  On exam, her wound has healthy granulation tissue with fibrinous exudate, minimal surrounding irritation with possible fungal infection      From a personal perspective, she is here with her  Enrique. She recently went home from rehab.         IMPRESSION   (T81.4XXD) Wound abscess, subsequent encounter  (primary encounter diagnosis)  (K91.89) Esophagectomy, anastomotic leak  (K21.9,  K22.2) GERD with stricture        72 year-old female S/P retrosternal gastric pull-through complicated by anastomotic leak, now with esophageal stent        PLAN  I spent a total of 15 minutes with Ms. Minerva JIMENEZ  Ryan, more than 50% of which were spent in counseling, coordination of care, and face-to-face time. I reviewed the plan as follows:  1) Dressing Change  2) Topical Antifungal, Clotrimazole topical cream 1% BiD  3) Return next Monday for EGD   They had all their questions answered and were in agreement with the plan.  I appreciate the opportunity to participate in the care of your patient and will keep you updated.  Sincerely,    Jens Wise MD

## 2017-07-14 ENCOUNTER — ANESTHESIA EVENT (OUTPATIENT)
Dept: SURGERY | Facility: CLINIC | Age: 72
End: 2017-07-14
Payer: MEDICARE

## 2017-07-14 NOTE — OP NOTE
Surgeon / Clinician: Nalini Barreto MD    PREOPERATIVE DIAGNOSIS:  Anastomotic leak status post esophagectomy.    POSTOPERATIVE DIAGNOSIS:  Anastomotic leak status post esophagectomy.    PROCEDURE PERFORMED:  Esophagoscopy with removal and replacement of esophageal stent and chest wound dressing change.   Pharyngostomy tube exchange.    SURGEON:  Dr. Nalini Barreto     Assisting Surgeon:  Dr. Storm Whitlock and Dr. Viki Carmen     ANESTHESIA:  General.    ESTIMATED BLOOD LOSS:  Minimal.      Operative Findings:  Persistent leak, however, much improved than previous.    Details of Procedure:  After obtaining informed consent, the patient is brought to the operating room and laid supine on the operating table.  After induction of general anesthesia and introduction of an endotracheal tube an adult gastroscope was passed per orally; it easily traversed the stent and we were able to visualize the conduit distal to it.  The stent was then removed with fluoroscopic visualization using rat tooth forceps.  On removal of the stent the defect in the anastomosis was clearly visualized.  There was also bubbling of air that was insufflated through the chest wound.  This was not previously seen while the stent was in place.  At this point, we decided to replace the stent.  A 19 x 120 EndoMAXX stent was then deployed centered around the anastomosis with fluoroscopic guidance.  After this, the repeat endoscopy confirmed adequate placement of the stent.  The pharyngostomy tube had been removed in the process and had to be replaced using wire through the gastroscope.  The pharyngostomy tube was then rethreaded into the conduit and was tunneled back into the neck using an NG tube as a guide.  It was secured to the neck.  The chest wound was then washed and repacked.  The patient tolerated the procedure well.        Nalini Barreto MD    D:  07/11/2017 16:26 T:  07/14/2017 10:00  Document:  [id number] \

## 2017-07-16 ENCOUNTER — NURSE TRIAGE (OUTPATIENT)
Dept: NURSING | Facility: CLINIC | Age: 72
End: 2017-07-16

## 2017-07-16 NOTE — TELEPHONE ENCOUNTER
Enrique, calling regarding Minerva and her procedure tomorrow. Has been told 3 different times to come in and is confused about exactly what will  Be done. Called OR control desk and spoke with Tracey who confirmed Minerva is on the schedule tomorrow at 9:40AM, advised to have patient  Arrive 7-7:30AM. Schedule shows incision and washout of wound procedure, but she states surgeon may decide to do endoscopy as well, as has in  Past. LM with Enrique's cell number with above information . Spoke directly with Enrique and confirmed he received message and has no further questions.  States they will be at hospital tomorrow at 7:30AM.  Lexy Corral RN   [facility]    Additional Information    Negative: [1] Caller is not with the adult (patient) AND [2] reporting urgent symptoms    Negative: Lab result questions    Negative: Medication questions    Negative: Caller cannot be reached by phone    Negative: Caller has already spoken to PCP or another triager    Negative: RN needs further essential information from caller in order to complete triage    Negative: Requesting regular office appointment    Negative: [1] Caller requesting NON-URGENT health information AND [2] PCP's office is the best resource    Negative: Health Information question, no triage required and triager able to answer question    Negative: General information question, no triage required and triager able to answer question    Question about upcoming scheduled test, no triage required and triager able to answer question    Protocols used: IN[facility] ONLY [facility]

## 2017-07-17 ENCOUNTER — ANESTHESIA (OUTPATIENT)
Dept: SURGERY | Facility: CLINIC | Age: 72
End: 2017-07-17
Payer: MEDICARE

## 2017-07-17 ENCOUNTER — APPOINTMENT (OUTPATIENT)
Dept: GENERAL RADIOLOGY | Facility: CLINIC | Age: 72
End: 2017-07-17
Attending: THORACIC SURGERY (CARDIOTHORACIC VASCULAR SURGERY)
Payer: MEDICARE

## 2017-07-17 ENCOUNTER — HOSPITAL ENCOUNTER (OUTPATIENT)
Facility: CLINIC | Age: 72
Discharge: HOME | End: 2017-07-17
Attending: THORACIC SURGERY (CARDIOTHORACIC VASCULAR SURGERY) | Admitting: THORACIC SURGERY (CARDIOTHORACIC VASCULAR SURGERY)
Payer: MEDICARE

## 2017-07-17 VITALS
TEMPERATURE: 97.3 F | DIASTOLIC BLOOD PRESSURE: 90 MMHG | SYSTOLIC BLOOD PRESSURE: 143 MMHG | HEART RATE: 62 BPM | WEIGHT: 91.05 LBS | BODY MASS INDEX: 17.88 KG/M2 | RESPIRATION RATE: 16 BRPM | HEIGHT: 60 IN | OXYGEN SATURATION: 98 %

## 2017-07-17 DIAGNOSIS — K91.89 ESOPHAGEAL ANASTOMOTIC LEAK: Primary | ICD-10-CM

## 2017-07-17 LAB
ABO + RH BLD: NORMAL
ABO + RH BLD: NORMAL
BLD GP AB SCN SERPL QL: NORMAL
BLOOD BANK CMNT PATIENT-IMP: NORMAL
HGB BLD-MCNC: 10.5 G/DL (ref 11.7–15.7)
SPECIMEN EXP DATE BLD: NORMAL

## 2017-07-17 PROCEDURE — 37000009 ZZH ANESTHESIA TECHNICAL FEE, EACH ADDTL 15 MIN: Performed by: THORACIC SURGERY (CARDIOTHORACIC VASCULAR SURGERY)

## 2017-07-17 PROCEDURE — 25000128 H RX IP 250 OP 636: Performed by: NURSE ANESTHETIST, CERTIFIED REGISTERED

## 2017-07-17 PROCEDURE — 40000277 XR SURGERY CARM FLUORO LESS THAN 5 MIN W STILLS: Mod: TC

## 2017-07-17 PROCEDURE — 71000015 ZZH RECOVERY PHASE 1 LEVEL 2 EA ADDTL HR: Performed by: THORACIC SURGERY (CARDIOTHORACIC VASCULAR SURGERY)

## 2017-07-17 PROCEDURE — 36415 COLL VENOUS BLD VENIPUNCTURE: CPT | Performed by: CLINICAL NURSE SPECIALIST

## 2017-07-17 PROCEDURE — 71000014 ZZH RECOVERY PHASE 1 LEVEL 2 FIRST HR: Performed by: THORACIC SURGERY (CARDIOTHORACIC VASCULAR SURGERY)

## 2017-07-17 PROCEDURE — 36000057 ZZH SURGERY LEVEL 3 1ST 30 MIN - UMMC: Performed by: THORACIC SURGERY (CARDIOTHORACIC VASCULAR SURGERY)

## 2017-07-17 PROCEDURE — 25000125 ZZHC RX 250: Performed by: NURSE ANESTHETIST, CERTIFIED REGISTERED

## 2017-07-17 PROCEDURE — 25000132 ZZH RX MED GY IP 250 OP 250 PS 637: Mod: GY | Performed by: SURGERY

## 2017-07-17 PROCEDURE — 36000059 ZZH SURGERY LEVEL 3 EA 15 ADDTL MIN UMMC: Performed by: THORACIC SURGERY (CARDIOTHORACIC VASCULAR SURGERY)

## 2017-07-17 PROCEDURE — 86900 BLOOD TYPING SEROLOGIC ABO: CPT | Performed by: CLINICAL NURSE SPECIALIST

## 2017-07-17 PROCEDURE — 27210794 ZZH OR GENERAL SUPPLY STERILE: Performed by: THORACIC SURGERY (CARDIOTHORACIC VASCULAR SURGERY)

## 2017-07-17 PROCEDURE — 40000170 ZZH STATISTIC PRE-PROCEDURE ASSESSMENT II: Performed by: THORACIC SURGERY (CARDIOTHORACIC VASCULAR SURGERY)

## 2017-07-17 PROCEDURE — C9399 UNCLASSIFIED DRUGS OR BIOLOG: HCPCS | Performed by: NURSE ANESTHETIST, CERTIFIED REGISTERED

## 2017-07-17 PROCEDURE — 37000008 ZZH ANESTHESIA TECHNICAL FEE, 1ST 30 MIN: Performed by: THORACIC SURGERY (CARDIOTHORACIC VASCULAR SURGERY)

## 2017-07-17 PROCEDURE — 85018 HEMOGLOBIN: CPT | Performed by: ANESTHESIOLOGY

## 2017-07-17 PROCEDURE — 86850 RBC ANTIBODY SCREEN: CPT | Performed by: CLINICAL NURSE SPECIALIST

## 2017-07-17 PROCEDURE — 71000027 ZZH RECOVERY PHASE 2 EACH 15 MINS: Performed by: THORACIC SURGERY (CARDIOTHORACIC VASCULAR SURGERY)

## 2017-07-17 PROCEDURE — 88300 SURGICAL PATH GROSS: CPT | Performed by: THORACIC SURGERY (CARDIOTHORACIC VASCULAR SURGERY)

## 2017-07-17 PROCEDURE — A9270 NON-COVERED ITEM OR SERVICE: HCPCS | Mod: GY | Performed by: SURGERY

## 2017-07-17 PROCEDURE — 25000566 ZZH SEVOFLURANE, EA 15 MIN: Performed by: THORACIC SURGERY (CARDIOTHORACIC VASCULAR SURGERY)

## 2017-07-17 PROCEDURE — C1874 STENT, COATED/COV W/DEL SYS: HCPCS | Performed by: THORACIC SURGERY (CARDIOTHORACIC VASCULAR SURGERY)

## 2017-07-17 PROCEDURE — 25000128 H RX IP 250 OP 636: Performed by: ANESTHESIOLOGY

## 2017-07-17 PROCEDURE — 86901 BLOOD TYPING SEROLOGIC RH(D): CPT | Performed by: CLINICAL NURSE SPECIALIST

## 2017-07-17 DEVICE — IMPLANTABLE DEVICE
Type: IMPLANTABLE DEVICE | Site: ESOPHAGUS | Status: NON-FUNCTIONAL
Removed: 2017-08-04

## 2017-07-17 RX ORDER — ONDANSETRON 4 MG/1
4 TABLET, ORALLY DISINTEGRATING ORAL
Status: DISCONTINUED | OUTPATIENT
Start: 2017-07-17 | End: 2017-07-17 | Stop reason: HOSPADM

## 2017-07-17 RX ORDER — CEFAZOLIN SODIUM 1 G/3ML
1 INJECTION, POWDER, FOR SOLUTION INTRAMUSCULAR; INTRAVENOUS SEE ADMIN INSTRUCTIONS
Status: DISCONTINUED | OUTPATIENT
Start: 2017-07-17 | End: 2017-07-17 | Stop reason: HOSPADM

## 2017-07-17 RX ORDER — LIDOCAINE HYDROCHLORIDE 20 MG/ML
INJECTION, SOLUTION INFILTRATION; PERINEURAL PRN
Status: DISCONTINUED | OUTPATIENT
Start: 2017-07-17 | End: 2017-07-17

## 2017-07-17 RX ORDER — ONDANSETRON 4 MG/1
4 TABLET, ORALLY DISINTEGRATING ORAL EVERY 30 MIN PRN
Status: DISCONTINUED | OUTPATIENT
Start: 2017-07-17 | End: 2017-07-17 | Stop reason: HOSPADM

## 2017-07-17 RX ORDER — LIDOCAINE 40 MG/G
CREAM TOPICAL
Status: DISCONTINUED | OUTPATIENT
Start: 2017-07-17 | End: 2017-07-17 | Stop reason: HOSPADM

## 2017-07-17 RX ORDER — SODIUM CHLORIDE, SODIUM LACTATE, POTASSIUM CHLORIDE, CALCIUM CHLORIDE 600; 310; 30; 20 MG/100ML; MG/100ML; MG/100ML; MG/100ML
INJECTION, SOLUTION INTRAVENOUS CONTINUOUS
Status: DISCONTINUED | OUTPATIENT
Start: 2017-07-17 | End: 2017-07-17 | Stop reason: HOSPADM

## 2017-07-17 RX ORDER — ACETAMINOPHEN 325 MG/1
650 TABLET ORAL
Status: DISCONTINUED | OUTPATIENT
Start: 2017-07-17 | End: 2017-07-17 | Stop reason: HOSPADM

## 2017-07-17 RX ORDER — ONDANSETRON 2 MG/ML
4 INJECTION INTRAMUSCULAR; INTRAVENOUS EVERY 30 MIN PRN
Status: DISCONTINUED | OUTPATIENT
Start: 2017-07-17 | End: 2017-07-17 | Stop reason: HOSPADM

## 2017-07-17 RX ORDER — ONDANSETRON 2 MG/ML
INJECTION INTRAMUSCULAR; INTRAVENOUS PRN
Status: DISCONTINUED | OUTPATIENT
Start: 2017-07-17 | End: 2017-07-17

## 2017-07-17 RX ORDER — FENTANYL CITRATE 50 UG/ML
25-50 INJECTION, SOLUTION INTRAMUSCULAR; INTRAVENOUS
Status: DISCONTINUED | OUTPATIENT
Start: 2017-07-17 | End: 2017-07-17 | Stop reason: HOSPADM

## 2017-07-17 RX ORDER — OXYCODONE HYDROCHLORIDE 5 MG/1
5-10 TABLET ORAL
Status: COMPLETED | OUTPATIENT
Start: 2017-07-17 | End: 2017-07-17

## 2017-07-17 RX ORDER — FENTANYL CITRATE 50 UG/ML
INJECTION, SOLUTION INTRAMUSCULAR; INTRAVENOUS PRN
Status: DISCONTINUED | OUTPATIENT
Start: 2017-07-17 | End: 2017-07-17

## 2017-07-17 RX ORDER — PROPOFOL 10 MG/ML
INJECTION, EMULSION INTRAVENOUS PRN
Status: DISCONTINUED | OUTPATIENT
Start: 2017-07-17 | End: 2017-07-17

## 2017-07-17 RX ORDER — EPHEDRINE SULFATE 50 MG/ML
INJECTION, SOLUTION INTRAMUSCULAR; INTRAVENOUS; SUBCUTANEOUS PRN
Status: DISCONTINUED | OUTPATIENT
Start: 2017-07-17 | End: 2017-07-17

## 2017-07-17 RX ORDER — SODIUM CHLORIDE, SODIUM LACTATE, POTASSIUM CHLORIDE, CALCIUM CHLORIDE 600; 310; 30; 20 MG/100ML; MG/100ML; MG/100ML; MG/100ML
INJECTION, SOLUTION INTRAVENOUS CONTINUOUS PRN
Status: DISCONTINUED | OUTPATIENT
Start: 2017-07-17 | End: 2017-07-17

## 2017-07-17 RX ORDER — OXYCODONE HYDROCHLORIDE 5 MG/1
5-10 TABLET ORAL
Qty: 20 TABLET | Refills: 0 | Status: SHIPPED | OUTPATIENT
Start: 2017-07-17 | End: 2017-08-02

## 2017-07-17 RX ORDER — CEFAZOLIN SODIUM 2 G/100ML
2 INJECTION, SOLUTION INTRAVENOUS
Status: DISCONTINUED | OUTPATIENT
Start: 2017-07-17 | End: 2017-07-17 | Stop reason: HOSPADM

## 2017-07-17 RX ORDER — DEXAMETHASONE SODIUM PHOSPHATE 4 MG/ML
INJECTION, SOLUTION INTRA-ARTICULAR; INTRALESIONAL; INTRAMUSCULAR; INTRAVENOUS; SOFT TISSUE PRN
Status: DISCONTINUED | OUTPATIENT
Start: 2017-07-17 | End: 2017-07-17

## 2017-07-17 RX ADMIN — LIDOCAINE HYDROCHLORIDE 100 MG: 20 INJECTION, SOLUTION INFILTRATION; PERINEURAL at 10:21

## 2017-07-17 RX ADMIN — PHENYLEPHRINE HYDROCHLORIDE 100 MCG: 10 INJECTION, SOLUTION INTRAMUSCULAR; INTRAVENOUS; SUBCUTANEOUS at 10:35

## 2017-07-17 RX ADMIN — FENTANYL CITRATE 50 MCG: 50 INJECTION, SOLUTION INTRAMUSCULAR; INTRAVENOUS at 11:37

## 2017-07-17 RX ADMIN — Medication 5 MG: at 10:40

## 2017-07-17 RX ADMIN — SUGAMMADEX 50 MG: 100 INJECTION, SOLUTION INTRAVENOUS at 11:30

## 2017-07-17 RX ADMIN — SODIUM CHLORIDE, POTASSIUM CHLORIDE, SODIUM LACTATE AND CALCIUM CHLORIDE: 600; 310; 30; 20 INJECTION, SOLUTION INTRAVENOUS at 10:07

## 2017-07-17 RX ADMIN — MIDAZOLAM HYDROCHLORIDE 2 MG: 1 INJECTION, SOLUTION INTRAMUSCULAR; INTRAVENOUS at 10:07

## 2017-07-17 RX ADMIN — SUCCINYLCHOLINE CHLORIDE 100 MG: 20 INJECTION, SOLUTION INTRAMUSCULAR; INTRAVENOUS at 10:21

## 2017-07-17 RX ADMIN — HYDROMORPHONE HYDROCHLORIDE 0.5 MG: 1 INJECTION, SOLUTION INTRAMUSCULAR; INTRAVENOUS; SUBCUTANEOUS at 12:17

## 2017-07-17 RX ADMIN — OXYCODONE HYDROCHLORIDE 10 MG: 5 TABLET ORAL at 13:22

## 2017-07-17 RX ADMIN — ROCURONIUM BROMIDE 20 MG: 10 INJECTION INTRAVENOUS at 10:36

## 2017-07-17 RX ADMIN — ACETAMINOPHEN 650 MG: 325 TABLET, FILM COATED ORAL at 13:22

## 2017-07-17 RX ADMIN — FENTANYL CITRATE 50 MCG: 50 INJECTION, SOLUTION INTRAMUSCULAR; INTRAVENOUS at 10:53

## 2017-07-17 RX ADMIN — HYDROMORPHONE HYDROCHLORIDE 0.5 MG: 1 INJECTION, SOLUTION INTRAMUSCULAR; INTRAVENOUS; SUBCUTANEOUS at 12:24

## 2017-07-17 RX ADMIN — PROPOFOL 170 MG: 10 INJECTION, EMULSION INTRAVENOUS at 10:21

## 2017-07-17 RX ADMIN — FENTANYL CITRATE 50 MCG: 50 INJECTION, SOLUTION INTRAMUSCULAR; INTRAVENOUS at 11:01

## 2017-07-17 RX ADMIN — SUGAMMADEX 150 MG: 100 INJECTION, SOLUTION INTRAVENOUS at 11:19

## 2017-07-17 RX ADMIN — Medication 5 MG: at 10:37

## 2017-07-17 RX ADMIN — DEXAMETHASONE SODIUM PHOSPHATE 4 MG: 4 INJECTION, SOLUTION INTRA-ARTICULAR; INTRALESIONAL; INTRAMUSCULAR; INTRAVENOUS; SOFT TISSUE at 10:36

## 2017-07-17 RX ADMIN — FENTANYL CITRATE 50 MCG: 50 INJECTION, SOLUTION INTRAMUSCULAR; INTRAVENOUS at 10:57

## 2017-07-17 RX ADMIN — HYDROMORPHONE HYDROCHLORIDE 0.5 MG: 1 INJECTION, SOLUTION INTRAMUSCULAR; INTRAVENOUS; SUBCUTANEOUS at 12:30

## 2017-07-17 RX ADMIN — HYDROMORPHONE HYDROCHLORIDE 0.5 MG: 1 INJECTION, SOLUTION INTRAMUSCULAR; INTRAVENOUS; SUBCUTANEOUS at 12:11

## 2017-07-17 RX ADMIN — ONDANSETRON 4 MG: 2 INJECTION INTRAMUSCULAR; INTRAVENOUS at 10:34

## 2017-07-17 ASSESSMENT — ENCOUNTER SYMPTOMS: DYSRHYTHMIAS: 1

## 2017-07-17 NOTE — ANESTHESIA POSTPROCEDURE EVALUATION
Patient: Minerva Blanco    Procedure(s):  Incision and Drainage of right Chest Wound, Esophagogastroduodenoscopy with stent exchange. pharangostomy tube revision - Wound Class: I-Clean    Diagnosis:Esophageal Perforation   Diagnosis Additional Information: No value filed.    Anesthesia Type:  General, ETT    Note:  Anesthesia Post Evaluation    Patient location during evaluation: PACU  Patient participation: Able to fully participate in evaluation  Level of consciousness: awake and alert  Pain management: adequate  Airway patency: patent  Cardiovascular status: hemodynamically stable  Respiratory status: acceptable  Hydration status: stable  PONV: none     Anesthetic complications: None          Last vitals:  Vitals:    07/17/17 1140 07/17/17 1150 07/17/17 1200   BP: 136/62 127/53    Pulse:      Resp: 16     Temp:   36.3  C (97.4  F)   SpO2: 100% 99% 98%         Electronically Signed By: Nilson Enriquez MD  July 17, 2017  12:07 PM

## 2017-07-17 NOTE — IP AVS SNAPSHOT
MRN:[id number]                      After Visit Summary   7/17/2017    Minerva Blanco    MRN: [id number]           Thank you!     Thank you for choosing [facility] for your care. Our goal is always to provide you with excellent care. Hearing back from our patients is one way we can continue to improve our services. Please take a few minutes to complete the written survey that you may receive in the mail after you visit with us. Thank you!        Patient Information     Date Of Birth          1945        About your hospital stay     You were admitted on:  July 17, 2017 You last received care in the:  Same Day Surgery [facility]    You were discharged on:  July 17, 2017       Who to Call     For medical emergencies, please call 911.  For non-urgent questions about your medical care, please call your primary care provider or clinic, [phone]  For questions related to your surgery, please call your surgery clinic        Attending Provider     Provider Jens Giles MD Thoracic Diseases       Primary Care Provider Office Phone # Fax #    Andrea iNno [phone]      After Care Instructions     Diet Instructions       Resume pre-procedure diet            Discharge Instructions       Follow up appointment in 1 week with Dr. Wise            Dressing       Keep dressing clean and dry.  Continue dressing changes as previously instructed -- no changes to current dressing change            No Alcohol       For 24 hours post procedure            No driving or operating machinery        until the day after procedure            Shower       No shower for 24 hours post procedure. May shower Postoperative Day (POD)  2            Weight bearing status - As tolerated                 Further instructions from your care team       [facility]  Same-Day Surgery   Adult Discharge Orders & Instructions     For 24 hours  after surgery    1. Get plenty of rest.  A responsible adult must stay with you for at least 24 hours after you leave the hospital.   2. Do not drive or use heavy equipment.  If you have weakness or tingling, don't drive or use heavy equipment until this feeling goes away.  3. Do not drink alcohol.  4. Avoid strenuous or risky activities.  Ask for help when climbing stairs.   5. You may feel lightheaded.  IF so, sit for a few minutes before standing.  Have someone help you get up.   6. If you have nausea (feel sick to your stomach): Drink only clear liquids such as apple juice, ginger ale, broth or 7-Up.  Rest may also help.  Be sure to drink enough fluids.  Move to a regular diet as you feel able.  7. You may have a slight fever. Call the doctor if your fever is over 100.5  F (37.7 C) (taken under the tongue) or lasts longer than 24 hours.  8. You may have a dry mouth, a sore throat, muscle aches or trouble sleeping.  These should go away after 24 hours.  9. Do not make important or legal decisions.   Call your doctor for any of the followin.  Signs of infection (fever, growing tenderness at the surgery site, a large amount of drainage or bleeding, severe pain, foul-smelling drainage, redness, swelling).    2. It has been over 8 to 10 hours since surgery and you are still not able to urinate (pass water).    3.  Headache for over 24 hours.    To contact a doctor, call Dr. Jens Wise @ [phone]--Thoracic surgery  or:        [phone] and ask for the resident on call for Thoracic surgery (answered 24 hours a day)      Emergency Department:    [facility]: [phone]       (TTY for hearing impaired: [phone])          Pending Results     Date and Time Order Name Status Description    2017 1059 Surgical pathology exam In process             Admission Information     Date & Time Provider Department Dept. Phone    2017 Jens Wise MD Same Day Surgery [facility]  "[phone]      Your Vitals Were     Blood Pressure Pulse Temperature Respirations Height Weight    139/66 62 97.3  F (36.3  C) (Oral) 16 1.524 m (5') 41.3 kg (91 lb 0.8 oz)    Pulse Oximetry BMI (Body Mass Index)                97% 17.78 kg/m2          [facility] Information     [employer] lets you send messages to your doctor, view your test results, renew your prescriptions, schedule appointments and more. To sign up, go to [email or web address] . Click on \"Log in\" on the left side of the screen, which will take you to the Welcome page. Then click on \"Sign up Now\" on the right side of the page.     You will be asked to enter the access code listed below, as well as some personal information. Please follow the directions to create your username and password.     Your access code is: [email or web address]  Expires: 2017 11:41 AM     Your access code will  in 90 days. If you need help or a new code, please call your [facility] clinic or [phone].        Care EveryWhere ID     This is your Care EveryWhere ID. This could be used by other organizations to access your [facility] medical records  [id number]        Equal Access to Services     TOM JORGENSEN : Dora shannon Sothiago, waaxda luqadaha, qaybta kaalmada adeegyada, florentin saldivar. So [facility] [phone].    ATENCIÓN: Si habla español, tiene a jackson disposición servicios gratuitos de asistencia lingüística. Llame al [phone].    We comply with applicable federal civil rights laws and [place] laws. We do not discriminate on the basis of race, color, national origin, age, disability sex, sexual orientation or gender identity.               Review of your medicines      CONTINUE these medicines which may have CHANGED, or have new prescriptions. If we are uncertain of the size of tablets/capsules you have at home, strength may be listed as something that might have changed.        Dose / Directions    loperamide 1 MG/5ML liquid "   Commonly known as:  IMODIUM   This may have changed:  when to take this   Used for:  Bowel habit changes        Dose:  4 mg   Take 20 mLs (4 mg) by mouth 4 times daily as needed for diarrhea   Quantity:  200 mL   Refills:  0       * oxyCODONE 10 MG IR tablet   Commonly known as:  ROXICODONE   This may have changed:  Another medication with the same name was added. Make sure you understand how and when to take each.   Used for:  Acute post-operative pain        Dose:  10 mg   Take 1 tablet (10 mg) by mouth every 4 hours as needed for moderate to severe pain   Quantity:  60 tablet   Refills:  0       * oxyCODONE 5 MG IR tablet   Commonly known as:  ROXICODONE   This may have changed:  You were already taking a medication with the same name, and this prescription was added. Make sure you understand how and when to take each.   Used for:  Esophageal anastomotic leak        Dose:  5-10 mg   Take 1-2 tablets (5-10 mg) by mouth every 3 hours as needed for pain or other (Moderate to Severe)   Quantity:  20 tablet   Refills:  0       traZODone 100 MG tablet   Commonly known as:  DESYREL   This may have changed:  how much to take   Used for:  Insomnia, unspecified type        Dose:  50 mg   0.5 tablets (50 mg) by Per G Tube route At Bedtime   Quantity:  30 tablet   Refills:  0       * Notice:  This list has 2 medication(s) that are the same as other medications prescribed for you. Read the directions carefully, and ask your doctor or other care provider to review them with you.      CONTINUE these medicines which have NOT CHANGED        Dose / Directions    acetaminophen 32 mg/mL solution   Commonly known as:  TYLENOL   Indication:  Pain   Used for:  Esophageal perforation        Dose:  975 mg   30.45 mLs (975 mg) by Per Feeding Tube route 3 times daily   Quantity:  300 mL   Refills:  0       BENADRYL PO        Dose:  25 mg   Take 25 mg by mouth nightly as needed   Refills:  0       chlorhexidine 0.12 % solution   Commonly  known as:  PERIDEX   Indication:  Tooth Plaque   Used for:  Esophageal perforation        Dose:  15 mL   Swish and spit 15 mLs in mouth 2 times daily   Refills:  0       chlorhexidine 4 % liquid   Commonly known as:  HIBICLENS   Used for:  History of esophagectomy        Apply topically 2 times daily   Quantity:  200 mL   Refills:  0       cholestyramine 4 G Packet   Commonly known as:  QUESTRAN        Dose:  1 packet   Take 1 packet by mouth daily   Refills:  0       clotrimazole 1 % cream   Commonly known as:  LOTRIMIN   Used for:  Wound abscess, subsequent encounter        Apply topically 2 times daily   Quantity:  12 g   Refills:  1       CULTURELLE PO        Dose:  1 tablet   Take 1 tablet by mouth 2 times daily   Refills:  0       diphenoxylate-atropine 2.5-0.025 MG/5ML liquid   Commonly known as:  LOMOTIL   Used for:  Bowel habit changes        Dose:  5 mL   Take 5 mLs by mouth 4 times daily as needed for diarrhea   Quantity:  300 mL   Refills:  0       fentaNYL 75 mcg/hr 72 hr patch   Commonly known as:  DURAGESIC   Used for:  Esophageal perforation, Acute post-operative pain        Dose:  1 patch   Place 1 patch onto the skin every 72 hours   Quantity:  5 patch   Refills:  0       ferrous sulfate 300 (60 FE) MG/5ML syrup   Used for:  Anemia due to other cause        Dose:  300 mg   5 mLs (300 mg) by Per J Tube route daily   Quantity:  150 mL   Refills:  0       fiber modular packet   Used for:  History of esophagectomy        Dose:  1 packet   1 packet by Per Feeding Tube route 3 times daily (with meals)   Quantity:  60 packet   Refills:  0       gabapentin 250 MG/5ML solution   Commonly known as:  NEURONTIN   Used for:  Acute post-operative pain        Dose:  300 mg   Take 6 mLs (300 mg) by mouth every 8 hours   Quantity:  550 mL   Refills:  0       metoprolol 10 mg/mL Susp   Commonly known as:  LOPRESSOR   Used for:  Atrial fibrillation, unspecified type (H)        Dose:  12.5 mg   1.25 mLs (12.5 mg) by  Per J Tube route 2 times daily   Refills:  0       multivitamins with minerals Liqd liquid        Dose:  15 mL   Take 15 mLs by mouth daily   Refills:  0       opium tincture 10 MG/ML (1%) liquid   Used for:  Bowel habit changes        Dose:  0.6 mL   Take 0.6 mLs (6 mg) by mouth every 6 hours as needed for diarrhea or moderate pain   Quantity:  118 mL   Refills:  0       * order for DME   Used for:  Hx of esophagectomy        Equipment being ordered:  [facility] Suction Machine-Intermittent Suction Canisters(2) Suction Canister Holders(2) Suction Connect Tube(2) 5 in 1 Connector(2) Bacteria Filter(2) Yaunkauer Suction(2)  Treatment Diagnosis: Esophogeal Perforation, s/p esophagectomy   Quantity:  1 Device   Refills:  0       * order for DME   Used for:  Esophageal perforation        Equipment being ordered:  Suction Pump Suction Canister(2) Suction Tubing(2) Bacteria Filter(2) Yaunkauer(4) Red Rubber Catheter(2)  Treatment Diagnosis: Esophogeal Perforation, s/p esophagectomy   Quantity:  1 Device   Refills:  0       pantoprazole 40 MG EC tablet   Commonly known as:  PROTONIX   Used for:  Gastroesophageal reflux disease, esophagitis presence not specified        Take by mouth 30-60 minutes before a meal.   Quantity:  30 tablet   Refills:  0       simethicone 40 MG/0.6ML suspension   Commonly known as:  MYLICON   Used for:  History of esophagectomy        Dose:  40 mg   0.6 mLs (40 mg) by Per Feeding Tube route 4 times daily   Refills:  0       sodium chloride (PF) 0.9% PF flush   Used for:  Gastroesophageal reflux disease, esophagitis presence not specified        Dose:  30 mL   30 mLs by Intracatheter route every 8 hours   Quantity:  1500 mL   Refills:  0       * Notice:  This list has 2 medication(s) that are the same as other medications prescribed for you. Read the directions carefully, and ask your doctor or other care provider to review them with you.         Where to get your medicines      Some of these will need  a paper prescription and others can be bought over the counter. Ask your nurse if you have questions.     Bring a paper prescription for each of these medications     oxyCODONE 5 MG IR tablet                Protect others around you: Learn how to safely use, store and throw away your medicines at www.disposemymeds.org.             Medication List: This is a list of all your medications and when to take them. Check marks below indicate your daily home schedule. Keep this list as a reference.      Medications           Morning Afternoon Evening Bedtime As Needed    acetaminophen 32 mg/mL solution   Commonly known as:  TYLENOL   30.45 mLs (975 mg) by Per Feeding Tube route 3 times daily                                BENADRYL PO   Take 25 mg by mouth nightly as needed                                chlorhexidine 0.12 % solution   Commonly known as:  PERIDEX   Swish and spit 15 mLs in mouth 2 times daily                                chlorhexidine 4 % liquid   Commonly known as:  HIBICLENS   Apply topically 2 times daily                                cholestyramine 4 G Packet   Commonly known as:  QUESTRAN   Take 1 packet by mouth daily                                clotrimazole 1 % cream   Commonly known as:  LOTRIMIN   Apply topically 2 times daily                                CULTURELLE PO   Take 1 tablet by mouth 2 times daily                                diphenoxylate-atropine 2.5-0.025 MG/5ML liquid   Commonly known as:  LOMOTIL   Take 5 mLs by mouth 4 times daily as needed for diarrhea                                fentaNYL 75 mcg/hr 72 hr patch   Commonly known as:  DURAGESIC   Place 1 patch onto the skin every 72 hours                                ferrous sulfate 300 (60 FE) MG/5ML syrup   5 mLs (300 mg) by Per J Tube route daily                                fiber modular packet   1 packet by Per Feeding Tube route 3 times daily (with meals)                                gabapentin 250 MG/5ML  solution   Commonly known as:  NEURONTIN   Take 6 mLs (300 mg) by mouth every 8 hours                                loperamide 1 MG/5ML liquid   Commonly known as:  IMODIUM   Take 20 mLs (4 mg) by mouth 4 times daily as needed for diarrhea                                metoprolol 10 mg/mL Susp   Commonly known as:  LOPRESSOR   1.25 mLs (12.5 mg) by Per J Tube route 2 times daily                                multivitamins with minerals Liqd liquid   Take 15 mLs by mouth daily                                opium tincture 10 MG/ML (1%) liquid   Take 0.6 mLs (6 mg) by mouth every 6 hours as needed for diarrhea or moderate pain                                * order for DME   Equipment being ordered:  [facility] [place] [place] Suction Machine-Intermittent Suction Canisters(2) Suction Canister Holders(2) Suction Connect Tube(2) 5 in 1 Connector(2) Bacteria Filter(2) Yaunkauer Suction(2)  Treatment Diagnosis: Esophogeal Perforation, s/p esophagectomy                                * order for DME   Equipment being ordered:  Suction Pump Suction Canister(2) Suction Tubing(2) Bacteria Filter(2) Yaunkauer(4) Red Rubber Catheter(2)  Treatment Diagnosis: Esophogeal Perforation, s/p esophagectomy                                * oxyCODONE 10 MG IR tablet   Commonly known as:  ROXICODONE   Take 1 tablet (10 mg) by mouth every 4 hours as needed for moderate to severe pain   Last time this was given:  10 mg on 7/17/2017  1:22 PM                                * oxyCODONE 5 MG IR tablet   Commonly known as:  ROXICODONE   Take 1-2 tablets (5-10 mg) by mouth every 3 hours as needed for pain or other (Moderate to Severe)   Last time this was given:  10 mg on 7/17/2017  1:22 PM                                pantoprazole 40 MG EC tablet   Commonly known as:  PROTONIX   Take by mouth 30-60 minutes before a meal.                                simethicone 40 MG/0.6ML suspension   Commonly known as:  MYLICON   0.6 mLs (40 mg) by Per Feeding Tube route 4  times daily                                sodium chloride (PF) 0.9% PF flush   30 mLs by Intracatheter route every 8 hours   Last time this was given:  7/17/2017 10:35 AM                                traZODone 100 MG tablet   Commonly known as:  DESYREL   0.5 tablets (50 mg) by Per G Tube route At Bedtime                                * Notice:  This list has 4 medication(s) that are the same as other medications prescribed for you. Read the directions carefully, and ask your doctor or other care provider to review them with you.

## 2017-07-17 NOTE — BRIEF OP NOTE
[facility] [place]    Brief Operative Note    Pre-operative diagnosis: Esophageal Perforation   Post-operative diagnosis * No post-op diagnosis entered *  Procedure: Procedure(s):  Incision and Drainage of right Chest Wound, Esophagogastroduodenoscopy with stent exchange. pharangostomy tube revision - Wound Class: I-Clean  Surgeon: Surgeon(s) and Role:     * Jens Wise MD - Primary     * Neri Lock MD - Resident - Assisting  Anesthesia: General   Estimated blood loss: Minimal  Drains:  Pharyngostomy tube replaced - used same tubing  Specimens:   ID Type Source Tests Collected by Time Destination   A : Esophogeal Stent Other (specify in comments) Other SURGICAL PATHOLOGY EXAM Jens Wise MD 7/17/2017 10:58 AM      Findings:   Interval decrease in size of perforation.  Complications: None.  Implants: 19 x 70 Endomaxx esophageal stent.

## 2017-07-17 NOTE — ANESTHESIA CARE TRANSFER NOTE
Patient: Minerva Blanco    Procedure(s):  Incision and Drainage of right Chest Wound, Esophagogastroduodenoscopy with stent exchange. pharangostomy tube revision - Wound Class: I-Clean    Diagnosis: Esophageal Perforation   Diagnosis Additional Information: No value filed.    Anesthesia Type:   General, ETT     Note:  Airway :Face Mask  Patient transferred to:PACU  Comments: To PACU, VSS, airway patent, RN at bedside.      Vitals: (Last set prior to Anesthesia Care Transfer)    CRNA VITALS  7/17/2017 1101 - 7/17/2017 1135      7/17/2017             Pulse: 65    SpO2: 99 %    Resp Rate (observed): 16    EKG: Sinus rhythm                Electronically Signed By: CLIVE Benitez CRNA  July 17, 2017  11:35 AM

## 2017-07-17 NOTE — ANESTHESIA PREPROCEDURE EVALUATION
Anesthesia Evaluation     . Pt has had prior anesthetic. Type: General and MAC    No history of anesthetic complications          ROS/MED HX    ENT/Pulmonary:  - neg pulmonary ROS     Neurologic: Comment: Meniere's Disease     (+)Multiple Sclerosis     Cardiovascular:  - neg cardiovascular ROS   (+) ----. Taking blood thinners : . . . :. dysrhythmias a-fib, . Previous cardiac testing Echodate:results:Interpretation Summary  Technically difficult study.  Global and regional left ventricular function is hyperdynamic with an LVEF  >70%.  The right ventricular function is hyperdynamic.  Mild pulmonary hypertension is present.  The inferior vena cava is normal. The estimated mean right atrial pressure is  <3 mmHg.  No pericardial effusion is present.  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 complicated by esophageal leak and mediastinal abscess s/p serial washouts under MAC; hiatal hernia; IBS    (+) GERD hiatal hernia,       Renal/Genitourinary:  - ROS Renal section negative       Endo:  - neg endo ROS       Psychiatric:  - neg psychiatric ROS       Infectious Disease: Comment: See GI  - neg infectious disease ROS       Malignancy:   (+) Malignancy History of Breast          Other: Comment: Fibromyalgia                     Physical Exam  Normal systems: cardiovascular, pulmonary and dental    Airway   Mallampati: I  TM distance: >3 FB  Neck ROM: full    Dental     Cardiovascular   Rhythm and rate: regular and normal      Pulmonary    breath sounds clear to auscultation                    Anesthesia Plan      History & Physical Review  History and physical reviewed and following examination; no interval  change.    ASA Status:  3 .    NPO Status:  > 8 hours    Plan for General and ETT with Intravenous and Propofol induction. Maintenance will be Inhalation.    PONV prophylaxis:  Ondansetron (or other 5HT-3) and Dexamethasone or Solumedrol  Additional equipment: 2nd IV      Postoperative Care  Postoperative pain management:  IV analgesics.      Consents  Anesthetic plan, risks, benefits and alternatives discussed with:  Patient..                          .

## 2017-07-17 NOTE — DISCHARGE INSTRUCTIONS
[facility]  Same-Day Surgery   Adult Discharge Orders & Instructions     For 24 hours after surgery    1. Get plenty of rest.  A responsible adult must stay with you for at least 24 hours after you leave the hospital.   2. Do not drive or use heavy equipment.  If you have weakness or tingling, don't drive or use heavy equipment until this feeling goes away.  3. Do not drink alcohol.  4. Avoid strenuous or risky activities.  Ask for help when climbing stairs.   5. You may feel lightheaded.  IF so, sit for a few minutes before standing.  Have someone help you get up.   6. If you have nausea (feel sick to your stomach): Drink only clear liquids such as apple juice, ginger ale, broth or 7-Up.  Rest may also help.  Be sure to drink enough fluids.  Move to a regular diet as you feel able.  7. You may have a slight fever. Call the doctor if your fever is over 100.5  F (37.7 C) (taken under the tongue) or lasts longer than 24 hours.  8. You may have a dry mouth, a sore throat, muscle aches or trouble sleeping.  These should go away after 24 hours.  9. Do not make important or legal decisions.   Call your doctor for any of the followin.  Signs of infection (fever, growing tenderness at the surgery site, a large amount of drainage or bleeding, severe pain, foul-smelling drainage, redness, swelling).    2. It has been over 8 to 10 hours since surgery and you are still not able to urinate (pass water).    3.  Headache for over 24 hours.    To contact a doctor, call Dr. Jens Wise @ [phone]--Thoracic surgery  or:        [phone] and ask for the resident on call for Thoracic surgery (answered 24 hours a day)      Emergency Department:    [facility]: [phone]       (TTY for hearing impaired: [phone])

## 2017-07-17 NOTE — IP AVS SNAPSHOT
Same Day Surgery [address] [address]    Phone:  [phone]                                       After Visit Summary   7/17/2017    Minerva Blanco    MRN: [id number]           After Visit Summary Signature Page     I have received my discharge instructions, and my questions have been answered. I have discussed any challenges I see with this plan with the nurse or doctor.    ..........................................................................................................................................  Patient/Patient Representative Signature      ..........................................................................................................................................  Patient Representative Print Name and Relationship to Patient    ..................................................               ................................................  Date                                            Time    ..........................................................................................................................................  Reviewed by Signature/Title    ...................................................              ..............................................  Date                                                            Time

## 2017-07-18 ENCOUNTER — HOME INFUSION (PRE-WILLOW HOME INFUSION) (OUTPATIENT)
Dept: PHARMACY | Facility: CLINIC | Age: 72
End: 2017-07-18

## 2017-07-18 ENCOUNTER — TELEPHONE (OUTPATIENT)
Dept: SURGERY | Facility: CLINIC | Age: 72
End: 2017-07-18

## 2017-07-18 ENCOUNTER — HOSPITAL ENCOUNTER (EMERGENCY)
Facility: CLINIC | Age: 72
Discharge: HOME | End: 2017-07-18
Attending: EMERGENCY MEDICINE | Admitting: EMERGENCY MEDICINE
Payer: MEDICARE

## 2017-07-18 VITALS
SYSTOLIC BLOOD PRESSURE: 140 MMHG | BODY MASS INDEX: 18.3 KG/M2 | DIASTOLIC BLOOD PRESSURE: 61 MMHG | WEIGHT: 93.7 LBS | TEMPERATURE: 98.4 F | HEART RATE: 91 BPM | OXYGEN SATURATION: 99 %

## 2017-07-18 DIAGNOSIS — M54.2 NECK PAIN: ICD-10-CM

## 2017-07-18 DIAGNOSIS — Z79.01 LONG-TERM (CURRENT) USE OF ANTICOAGULANTS: ICD-10-CM

## 2017-07-18 DIAGNOSIS — G89.18 POSTOPERATIVE PAIN: ICD-10-CM

## 2017-07-18 DIAGNOSIS — Z90.49 HX OF ESOPHAGECTOMY: ICD-10-CM

## 2017-07-18 DIAGNOSIS — G35 MULTIPLE SCLEROSIS (H): ICD-10-CM

## 2017-07-18 DIAGNOSIS — Z98.890 HX OF ESOPHAGECTOMY: ICD-10-CM

## 2017-07-18 DIAGNOSIS — I48.91 ATRIAL FIBRILLATION, UNSPECIFIED TYPE (H): ICD-10-CM

## 2017-07-18 LAB — COPATH REPORT: NORMAL

## 2017-07-18 PROCEDURE — 99283 EMERGENCY DEPT VISIT LOW MDM: CPT | Mod: Z6 | Performed by: EMERGENCY MEDICINE

## 2017-07-18 PROCEDURE — 99282 EMERGENCY DEPT VISIT SF MDM: CPT | Performed by: EMERGENCY MEDICINE

## 2017-07-18 ASSESSMENT — ENCOUNTER SYMPTOMS
COUGH: 0
FLANK PAIN: 0
FACIAL SWELLING: 1
CHILLS: 0
DYSURIA: 0
NECK PAIN: 1
FEVER: 0
FATIGUE: 0

## 2017-07-18 NOTE — TELEPHONE ENCOUNTER
"Voicemail from Enrique, Minerva's , reporting that Minerva is having trouble swallowing, has a lot of discomfort, her \"whole face is swollen\" and she has a low grade temp of 99.5. Spoke with the thoracic team and the recommendation is for her to come to the ER where someone from our team can evaluate her. Call to Enrique to let him know this but there was no answer. Message left on his voicemail instructing him to bring Minerva to the ER.   "

## 2017-07-18 NOTE — PROGRESS NOTES
"Thoracic Surgery Progress Note    Subjective:    Patient presents to ED today after discussion with Thoracic surgery team via telephone. She complains of new swelling, pain and difficulty swallowing after pharyngostomy replacement yesterday. She states a similar episode happened in the past and was concerned it might be happening again. She states the pharyngostomy is working as expected and she receives all her fluid, food, and meds through her J-tube. She is not having trouble breathing. She also expresses frustration regarding pain medication prescriptions and requests refills of her oxycodone \"with 10 mg tabs\" and fentanyl patches.    Vitals:  /61  Pulse 91  Temp 98.4  F (36.9  C) (Oral)  Wt 42.5 kg (93 lb 11.2 oz)  SpO2 99%  BMI 18.3 kg/m2  Gen: Awake, alert, NAD , interactive. Pharyngostomy tube in place. Site is clean, dry and without warmth or discharge. Minimal redness  Resp: non-labored on room air  Abd: Soft, non-distended, NTTP. 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 No concern for acute infectious process at this time. Tube does not need to be repositioned. Patient is able to maintain hydration via J tube and airway is not compromised.    -OK to discharge from thoracic surgery standpoint  -Patient will follow up in clinic next week with Dr. Wise for further assessment  -Thoracic surgery will not be prescribing further narcotic pain medication. The patient has been previously instructed to follow up with the pain clinic for ongoing narcotic requirements. Patient should also have roughly 8 tabs remaining of the prescription given yesterday after her procedure.     Discussed with Dylan Bello and Estevan Lock MD  General Surgery PGY-3    "

## 2017-07-18 NOTE — ED AVS SNAPSHOT
[facility], [place], Emergency Department    [address] [address]    Phone:  [phone]                                       Minerva Blanco   MRN: [id number]    Department:  [facility], Emergency Department   Date of Visit:  7/18/2017           After Visit Summary Signature Page     I have received my discharge instructions, and my questions have been answered. I have discussed any challenges I see with this plan with the nurse or doctor.    ..........................................................................................................................................  Patient/Patient Representative Signature      ..........................................................................................................................................  Patient Representative Print Name and Relationship to Patient    ..................................................               ................................................  Date                                            Time    ..........................................................................................................................................  Reviewed by Signature/Title    ...................................................              ..............................................  Date                                                            Time

## 2017-07-18 NOTE — ED NOTES
swelling in throat, difficulty swallowing, had pharyngostomy tube replaced yesterday. VSS, Was told that thoracic surgery will see her

## 2017-07-18 NOTE — ED AVS SNAPSHOT
[facility], Emergency Department    [address] [address]    Phone:  [phone]                                       Minerva Blanco   MRN: [id number]    Department:  [facility], Emergency Department   Date of Visit:  7/18/2017           Patient Information     Date Of Birth          1945        Your diagnoses for this visit were:     Neck pain        You were seen by Ciaran Dean MD.      Follow-up Information     Follow up with Thoracic Surgery.        Discharge Instructions         General Neck and Back Pain    Both neck and back pain are usually caused by injury to the muscles or ligaments of the spine. Sometimes the disks that separate each bone of the spine may cause pain by pressing on a nearby nerve. Back and neck pain may appear after a sudden twisting or bending force (such as in a car accident), or sometimes after a simple awkward movement. In either case, muscle spasm is often present and adds to the pain.  Acute neck and back pain usually gets better in 1 to 2 weeks. Pain related to disk disease, arthritis in the spinal joints or spinal stenosis (narrowing of the spinal canal) can become chronic and last for months or years.  Back and neck pain are common problems. Most people feel better in 1 or 2 weeks, and most of the rest in 1 to 2 months. Most people can remain active.  People experience and describe pain differently.    Pain can be sharp, stabbing, shooting, aching, cramping, or burning    Movement, standing, bending, lifting, sitting, or walking may worsen the pain    Pain can be localized to one spot or area, or it can be more generalized    Pain can spread or radiate upwards, downwards, to the front, or go down your arms    Muscle spasm may occur.  Most of the time mechanical problems with the muscles or spine cause the pain. it is usually caused by an injury, whether known or not, to the muscles or ligaments. While illnesses can cause back pain, it  is usually not caused by a serious illness. Pain is usually related to physical activity, whether sports, exercise, work, or normal activity. Sometimes it can occur without an identifiable cause. This can happen simply by stretching or moving wrong, without noting pain at the time. Other causes include:    Overexertion, lifting, pushing, pulling incorrectly or too aggressively.    Sudden twisting, bending or stretching from an accident (car or fall), or accidental movement.    Poor posture    Poor conditioning, lack of regular exercise    Spinal disc disease or arthritis    Stress    Pregnancy, or illness like appendicitis, bladder or kidney infection, pelvic infections   Home care    For neck pain: Use a comfortable pillow that supports the head and keeps the spine in a neutral position. The position of the head should not be tilted forward or backward.    When in bed, try to find a position of comfort. A firm mattress is best. Try lying flat on your back with pillows under your knees. You can also try lying on your side with your knees bent up towards your chest and a pillow between your knees.    At first, do not try to stretch out the sore spots. If there is a strain, it is not like the good soreness you get after exercising without an injury. In this case, stretching may make it worse.    Avoid prolonged sitting, long car rides or travel. This puts more stress on the lower back than standing or walking.    During the first 24 to 72 hours after an injury, apply an ice pack to the painful area for 20 minutes and then remove it for 20 minutes over a period of 60 to 90 minutes or several times a day.     You can alternate ice and heat therapies. Talk with your healthcare provider about the best treatment for your back or neck pain. As a safety precaution, do not use a heating pad at bedtime. Sleeping with a heating pad can lead to skin burns or tissue damage.    Therapeutic massage can help relax the back and neck  muscles without stretching them.    Be aware of safe lifting methods and do not lift anything over 15 pounds until all the pain is gone.  Medications  Talk to your healthcare provider before using medicine, especially if you have other medical problems or are taking other medicines.    You may use over-the-counter medicine to control pain, unless another pain medicine was prescribed. If you have chronic conditions like diabetes, liver or kidney disease, stomach ulcers,  gastrointestinal bleeding, or are taking blood thinner medicines.    Be careful if you are given pain medicines, narcotics, or medicine for muscle spasm. They can cause drowsiness, and can affect your coordination, reflexes, and judgment. Do not drive or operate heavy machinery.  Follow-up care  Follow up with your healthcare provider, or as advised. Physical therapy or further tests may be needed.  If X-rays were taken, you will be notified of any new findings that may affect your care.  Call 911  Seek emergency medical care if any of the following occur:    Trouble breathing    Confusion    Very drowsy or trouble awakening    Fainting or loss of consciousness    Rapid or very slow heart rate    Loss of bowel or bladder control  When to seek medical advice  Call your healthcare provider right away if any of these occur:    Pain becomes worse or spreads into your arms or legs    Weakness, numbness or pain in one or both arms or legs    Numbness in the groin area    Difficulty walking    Fever of 100.4 F (38 C) or higher, or as directed by your healthcare provider  Date Last Reviewed: 7/1/2016 2000-2017 [employer]. [address] [place] [address], [place] [address]. All rights reserved. This information is not intended as a substitute for professional medical care. Always follow your healthcare professional's instructions.          24 Hour Appointment Hotline       To make an appointment at any [facility], call [phone]  ([phone]). If you don't have a family doctor or clinic, we will help you find one. [place] clinics are conveniently located to serve the needs of you and your family.             Review of your medicines      Our records show that you are taking the medicines listed below. If these are incorrect, please call your family doctor or clinic.        Dose / Directions Last dose taken    acetaminophen 32 mg/mL solution   Commonly known as:  TYLENOL   Dose:  975 mg   Indication:  Pain   Quantity:  300 mL        30.45 mLs (975 mg) by Per Feeding Tube route 3 times daily   Refills:  0        BENADRYL PO   Dose:  25 mg        Take 25 mg by mouth nightly as needed   Refills:  0        chlorhexidine 0.12 % solution   Commonly known as:  PERIDEX   Dose:  15 mL   Indication:  Tooth Plaque        Swish and spit 15 mLs in mouth 2 times daily   Refills:  0        chlorhexidine 4 % liquid   Commonly known as:  HIBICLENS   Quantity:  200 mL        Apply topically 2 times daily   Refills:  0        cholestyramine 4 G Packet   Commonly known as:  QUESTRAN   Dose:  1 packet        Take 1 packet by mouth daily   Refills:  0        clotrimazole 1 % cream   Commonly known as:  LOTRIMIN   Quantity:  12 g        Apply topically 2 times daily   Refills:  1        CULTURELLE PO   Dose:  1 tablet        Take 1 tablet by mouth 2 times daily   Refills:  0        diphenoxylate-atropine 2.5-0.025 MG/5ML liquid   Commonly known as:  LOMOTIL   Dose:  5 mL   Quantity:  300 mL        Take 5 mLs by mouth 4 times daily as needed for diarrhea   Refills:  0        fentaNYL 75 mcg/hr 72 hr patch   Commonly known as:  DURAGESIC   Dose:  1 patch   Quantity:  5 patch        Place 1 patch onto the skin every 72 hours   Refills:  0        ferrous sulfate 300 (60 FE) MG/5ML syrup   Dose:  300 mg   Quantity:  150 mL        5 mLs (300 mg) by Per J Tube route daily   Refills:  0        fiber modular packet   Dose:  1 packet   Quantity:  60 packet        1  packet by Per Feeding Tube route 3 times daily (with meals)   Refills:  0        gabapentin 250 MG/5ML solution   Commonly known as:  NEURONTIN   Dose:  300 mg   Quantity:  550 mL        Take 6 mLs (300 mg) by mouth every 8 hours   Refills:  0        loperamide 1 MG/5ML liquid   Commonly known as:  IMODIUM   Dose:  4 mg   Quantity:  200 mL        Take 20 mLs (4 mg) by mouth 4 times daily as needed for diarrhea   Refills:  0        metoprolol 10 mg/mL Susp   Commonly known as:  LOPRESSOR   Dose:  12.5 mg        1.25 mLs (12.5 mg) by Per J Tube route 2 times daily   Refills:  0        multivitamins with minerals Liqd liquid   Dose:  15 mL        Take 15 mLs by mouth daily   Refills:  0        opium tincture 10 MG/ML (1%) liquid   Dose:  0.6 mL   Quantity:  118 mL        Take 0.6 mLs (6 mg) by mouth every 6 hours as needed for diarrhea or moderate pain   Refills:  0        * order for DME   Quantity:  1 Device        Equipment being ordered:  [facility] Suction Machine-Intermittent Suction Canisters(2) Suction Canister Holders(2) Suction Connect Tube(2) 5 in 1 Connector(2) Bacteria Filter(2) [employer]auer Suction(2)  Treatment Diagnosis: Esophogeal Perforation, s/p esophagectomy   Refills:  0        * order for DME   Quantity:  1 Device        Equipment being ordered:  Suction Pump Suction Canister(2) Suction Tubing(2) Bacteria Filter(2) Yaunkauer(4) Red Rubber Catheter(2)  Treatment Diagnosis: Esophogeal Perforation, s/p esophagectomy   Refills:  0        * oxyCODONE 10 MG IR tablet   Commonly known as:  ROXICODONE   Dose:  10 mg   Quantity:  60 tablet        Take 1 tablet (10 mg) by mouth every 4 hours as needed for moderate to severe pain   Refills:  0        * oxyCODONE 5 MG IR tablet   Commonly known as:  ROXICODONE   Dose:  5-10 mg   Quantity:  20 tablet        Take 1-2 tablets (5-10 mg) by mouth every 3 hours as needed for pain or other (Moderate to Severe)   Refills:  0        pantoprazole 40 MG EC tablet   Commonly  known as:  PROTONIX   Quantity:  30 tablet        Take by mouth 30-60 minutes before a meal.   Refills:  0        simethicone 40 MG/0.6ML suspension   Commonly known as:  MYLICON   Dose:  40 mg        0.6 mLs (40 mg) by Per Feeding Tube route 4 times daily   Refills:  0        sodium chloride (PF) 0.9% PF flush   Dose:  30 mL   Quantity:  1500 mL        30 mLs by Intracatheter route every 8 hours   Refills:  0        traZODone 100 MG tablet   Commonly known as:  DESYREL   Dose:  50 mg   Quantity:  30 tablet        0.5 tablets (50 mg) by Per G Tube route At Bedtime   Refills:  0        * Notice:  This list has 4 medication(s) that are the same as other medications prescribed for you. Read the directions carefully, and ask your doctor or other care provider to review them with you.            Orders Needing Specimen Collection     None      Pending Results     No orders found from 7/16/2017 to 7/19/2017.            Pending Culture Results     No orders found from 7/16/2017 to 7/19/2017.            Pending Results Instructions     If you had any lab results that were not finalized at the time of your Discharge, you can call the ED Lab Result RN at [phone]. You will be contacted by this team for any positive Lab results or changes in treatment. The nurses are available 7 days a week from 10A to 6:30P.  You can leave a message 24 hours per day and they will return your call.        Thank you for choosing [facility]       Thank you for choosing [facility] for your care. Our goal is always to provide you with excellent care. Hearing back from our patients is one way we can continue to improve our services. Please take a few minutes to complete the written survey that you may receive in the mail after you visit with us. Thank you!        [employer] Information     [employer] lets you send messages to your doctor, view your test results, renew your prescriptions, schedule appointments and more. To sign up, go to  [email or web address] . Click on \"Log in\" on the left side of the screen, which will take you to the Welcome page. Then click on \"Sign up Now\" on the right side of the page.     You will be asked to enter the access code listed below, as well as some personal information. Please follow the directions to create your username and password.     Your access code is: [email or web address]  Expires: 2017 11:41 AM     Your access code will  in 90 days. If you need help or a new code, please call your [place] clinic or [phone].        Care EveryWhere ID     This is your Care EveryWhere ID. This could be used by other organizations to access your [place] medical records  [id number]        Equal Access to Services     TOM JORGENSEN : Dora Meng, isis romero, katharina anderson, florentin saldivar. So [facility] [phone].    ATENCIÓN: Si habla español, tiene a jackson disposición servicios gratuitos de asistencia lingüística. Sreekanth al [phone].    We comply with applicable federal civil rights laws and [place] laws. We do not discriminate on the basis of race, color, national origin, age, disability sex, sexual orientation or gender identity.            After Visit Summary       This is your record. Keep this with you and show to your community pharmacist(s) and doctor(s) at your next visit.                  "

## 2017-07-18 NOTE — ED PROVIDER NOTES
History     Chief Complaint   Patient presents with     Post-op Problem     HPI  Minerva Blanco is a 72 year old female with a history of s/p pharyngostomy, s/p esophagectomy, s/p thoracotomy, GERD, atrial fibrillation, esophageal anastomotic leak and neck abscess who was in the Operating Room yesterday (7/17/2017) having a new pharyngostomy tube placed. She presents today for facial swelling and pain. Patient reports having facial swelling on her left jaw where the tube was placed accompanied by pain in her left jaw and neck. Patient reports the pain currently is 8/10 and is exacerbated by palpation and movement of the head. She called the surgeon team this morning who told her to come to the Emergency Department and they would meet her here to evaluate her. Patient has had a pharyngostomy tube placed incorrectly in the past and reports similar symptoms to what she is currently experiencing. Patient also reported she is almost out of pain medications. Of note, patient usually takes her oral medications through the tube and usually uses oxycodone for pain.    I have reviewed the Medications, Allergies, Past Medical and Surgical History, and Social History in the [facility].    Past Medical History:   Diagnosis Date     Atrial fibrillation (H)      Breast cancer (H) 2007    right side - lumpectomy, chemo, and local radiation     Chronic infection     infection/wound for two years after esoph surgery     Esophageal perforation      Fibromyalgia      GERD (gastroesophageal reflux disease)      Hiatal hernia      History of blood transfusion      IBS (irritable bowel syndrome)      Meniere's disease     deaf left ear     MS (multiple sclerosis) (H)      Noninfectious ileitis      Other chronic pain        Past Surgical History:   Procedure Laterality Date     APPENDECTOMY       BACK SURGERY  5/1/2015    lumbar fusion     BRONCHOSCOPY FLEXIBLE N/A 4/17/2017    Procedure: BRONCHOSCOPY FLEXIBLE;;  Surgeon: Frandy  Jens Saul MD;  [facility]     C GASTROSTOMY/JEJUN TUBE      J tube for feedings     CLOSE SPIT FISTULA N/A 4/17/2017    Procedure: CLOSE SPIT FISTULA;;  Surgeon: Jens Wise MD;  [facility]     COMBINED ESOPHAGOSCOPY, GASTROSCOPY, DUODENOSCOPY (EGD), REMOVE ESOPHAGEAL STENT N/A 8/26/2016    Procedure: COMBINED ESOPHAGOSCOPY, GASTROSCOPY, DUODENOSCOPY (EGD), REMOVE ESOPHAGEAL STENT;  Surgeon: Jens Wise MD;  [facility]     CREATE SPIT FISTULA N/A 1/9/2017    Procedure: CREATE SPIT FISTULA;  Surgeon: Jens Wise MD;  [facility]     ESOPHAGECTOMY N/A 1/9/2017    Procedure: ESOPHAGECTOMY;  Surgeon: Jens Wise MD;  [facility]     ESOPHAGOSCOPY, GASTROSCOPY, DUODENOSCOPY (EGD), COMBINED N/A 4/18/2016    Procedure: COMBINED ESOPHAGOSCOPY, GASTROSCOPY, DUODENOSCOPY (EGD);  Surgeon: Alexis Barraza MD;  [facility]     ESOPHAGOSCOPY, GASTROSCOPY, DUODENOSCOPY (EGD), COMBINED N/A 4/25/2016    Procedure: COMBINED ESOPHAGOSCOPY, GASTROSCOPY, DUODENOSCOPY (EGD);  Surgeon: Alexis Barraza MD;  [facility]     ESOPHAGOSCOPY, GASTROSCOPY, DUODENOSCOPY (EGD), COMBINED N/A 5/4/2016    Procedure: COMBINED ESOPHAGOSCOPY, GASTROSCOPY, DUODENOSCOPY (EGD);  Surgeon: Alexis Barraza MD;  [facility]     ESOPHAGOSCOPY, GASTROSCOPY, DUODENOSCOPY (EGD), COMBINED N/A 5/18/2016    Procedure: COMBINED ESOPHAGOSCOPY, GASTROSCOPY, DUODENOSCOPY (EGD);  Surgeon: Alexis Barraza MD;  [facility]     ESOPHAGOSCOPY, GASTROSCOPY, DUODENOSCOPY (EGD), COMBINED N/A 6/22/2016    Procedure: COMBINED ESOPHAGOSCOPY, GASTROSCOPY, DUODENOSCOPY (EGD);  Surgeon: Alexis Barraza MD;  [facility]     ESOPHAGOSCOPY, GASTROSCOPY, DUODENOSCOPY (EGD), COMBINED N/A 7/12/2016    Procedure: COMBINED ESOPHAGOSCOPY, GASTROSCOPY, DUODENOSCOPY (EGD);  Surgeon: Jens Wise MD;  [facility]      ESOPHAGOSCOPY, GASTROSCOPY, DUODENOSCOPY (EGD), COMBINED N/A 4/21/2017    Procedure: COMBINED ESOPHAGOSCOPY, GASTROSCOPY, DUODENOSCOPY (EGD);  Esophagogastroduodenoscopy, Pharyngostomy Tube Placement ;  Surgeon: Jens Wise MD;  [facility]     ESOPHAGOSCOPY, GASTROSCOPY, DUODENOSCOPY (EGD), COMBINED N/A 5/19/2017    Procedure: COMBINED ESOPHAGOSCOPY, GASTROSCOPY, DUODENOSCOPY (EGD);  Upper Endoscopy, Irrigation and Debridement of Chest Wound ;  Surgeon: Nalini Barreto MD;  [facility]     ESOPHAGOSCOPY, GASTROSCOPY, DUODENOSCOPY (EGD), COMBINED N/A 5/23/2017    Procedure: COMBINED ESOPHAGOSCOPY, GASTROSCOPY, DUODENOSCOPY (EGD);;  Surgeon: Nalini Barreto MD;  [facility]     ESOPHAGOSCOPY, GASTROSCOPY, DUODENOSCOPY (EGD), COMBINED N/A 6/27/2017    Procedure: COMBINED ESOPHAGOSCOPY, GASTROSCOPY, DUODENOSCOPY (EGD);  Esophagogastroduodenoscopy With Removal And Replacement Of Esophageal Stent exchange. Exchange of pharyngtomy tube. Chest wound dressing change;  Surgeon: Nalini Barreto MD;  [facility]     ESOPHAGOSCOPY, GASTROSCOPY, DUODENOSCOPY (EGD), DILATATION, COMBINED N/A 6/29/2016    Procedure: COMBINED ESOPHAGOSCOPY, GASTROSCOPY, DUODENOSCOPY (EGD), DILATATION;  Surgeon: Jens Wise MD;  [facility]     EXPLORE NECK N/A 5/19/2017    Procedure: EXPLORE NECK;;  Surgeon: Nalini Barreto MD;  [facility]     HC UGI ENDOSCOPY W TRANSENDOSCOPIC STENT PLACEMENT N/A 7/12/2016    Procedure: COMBINED ESOPHAGOSCOPY, GASTROSCOPY, DUODENOSCOPY (EGD), PLACE TRANSENDOSCOPIC ESOPHAGEAL STENT;  Surgeon: Jens Wise MD;  [facility]     HC UGI ENDOSCOPY W TRANSENDOSCOPIC STENT PLACEMENT N/A 7/22/2016    Procedure: COMBINED ESOPHAGOSCOPY, GASTROSCOPY, DUODENOSCOPY (EGD), PLACE TRANSENDOSCOPIC ESOPHAGEAL STENT;  Surgeon: Jens Wise MD;  [facility]     INCISION AND DRAINAGE CHEST WASHOUT, COMBINED N/A 5/27/2017    Procedure: COMBINED  INCISION AND DRAINAGE CHEST WASHOUT;  Chest washout;  Surgeon: Nalini Barreto MD;  [facility]     INCISION AND DRAINAGE CHEST WASHOUT, COMBINED N/A 5/28/2017    Procedure: COMBINED INCISION AND DRAINAGE CHEST WASHOUT;  Chest washout;  Surgeon: Nalini Barreto MD;  [facility]     INCISION AND DRAINAGE CHEST WASHOUT, COMBINED N/A 6/9/2017    Procedure: COMBINED INCISION AND DRAINAGE CHEST WASHOUT;  Chest washout and dressing change, Esophaogastroduodenoscopy;  Surgeon: Jens Wise MD;  [facility]     IRRIGATION AND DEBRIDEMENT CHEST WASHOUT, COMBINED N/A 6/29/2016    Procedure: COMBINED IRRIGATION AND DEBRIDEMENT CHEST WASHOUT;  Surgeon: Jens Wise MD;  [facility]     IRRIGATION AND DEBRIDEMENT CHEST WASHOUT, COMBINED N/A 5/23/2017    Procedure: COMBINED IRRIGATION AND DEBRIDEMENT CHEST WASHOUT;  COMBINED IRRIGATION AND DEBRIDEMENT CHEST WASHOUT,COMBINED ESOPHAGOSCOPY, GASTROSCOPY, DUODENOSCOPY (EGD) ;  Surgeon: Nalini Barreto MD;  [facility]     IRRIGATION AND DEBRIDEMENT CHEST WASHOUT, COMBINED N/A 5/24/2017    Procedure: COMBINED IRRIGATION AND DEBRIDEMENT CHEST WASHOUT;  Chest wound Washout and Dressing Change;  Surgeon: Nalini Barreto MD;  [facility]     IRRIGATION AND DEBRIDEMENT CHEST WASHOUT, COMBINED N/A 5/25/2017    Procedure: COMBINED IRRIGATION AND DEBRIDEMENT CHEST WASHOUT;  Irrigation and Debridement Chest Washout and dressing change;  Surgeon: Nalini Barreto MD;  [facility]     IRRIGATION AND DEBRIDEMENT CHEST WASHOUT, COMBINED N/A 5/26/2017    Procedure: COMBINED IRRIGATION AND DEBRIDEMENT CHEST WASHOUT;  Irrigation and Debridement Chest Washout with  dressing change;  Surgeon: Nalini Barreto MD;  [facility]     IRRIGATION AND DEBRIDEMENT CHEST WASHOUT, COMBINED N/A 5/30/2017    Procedure: COMBINED IRRIGATION AND DEBRIDEMENT CHEST WASHOUT;  Irrigation and Debridement Chest Washout , PICC line dressing change;  Surgeon: Estevan  Nalini NAVARRO MD;  [facility]     IRRIGATION AND DEBRIDEMENT CHEST WASHOUT, COMBINED N/A 5/31/2017    Procedure: COMBINED IRRIGATION AND DEBRIDEMENT CHEST WASHOUT;  Irrigation and Debridement Chest Washout ;  Surgeon: Nalini Barreto MD;  [facility]     IRRIGATION AND DEBRIDEMENT CHEST WASHOUT, COMBINED N/A 6/1/2017    Procedure: COMBINED IRRIGATION AND DEBRIDEMENT CHEST WASHOUT;  Chest Wound Irrigation And Debridement with dressing change ;  Surgeon: Nalini Barreto MD;  [facility]     IRRIGATION AND DEBRIDEMENT CHEST WASHOUT, COMBINED N/A 6/4/2017    Procedure: COMBINED IRRIGATION AND DEBRIDEMENT CHEST WASHOUT;  COMBINED IRRIGATION AND DEBRIDEMENT CHEST WASHOUT, Esophagoscopy, Gastroscopy, Duodenoscopy (EGD) place transendoscopic esophageal stent,   Pharyngostomy and chest wall tube exchange  C-Arm;  Surgeon: Jens Wise MD;  [facility]     IRRIGATION AND DEBRIDEMENT CHEST WASHOUT, COMBINED N/A 6/2/2017    Procedure: COMBINED IRRIGATION AND DEBRIDEMENT CHEST WASHOUT;  Chest Wound Irrigation and Debridement, Dressing Change;  Surgeon: Nalini Barreto MD;  [facility]     LAPAROSCOPIC ASSISTED INSERTION TUBE JEJUNOSTOMY N/A 4/17/2017    Procedure: LAPAROSCOPIC ASSISTED INSERTION TUBE JEJUNOSTOMY;;  Surgeon: Jens Wise MD;  [facility]     LAPAROSCOPY DIAGNOSTIC (GENERAL) N/A 1/9/2017    Procedure: LAPAROSCOPY DIAGNOSTIC (GENERAL);  Surgeon: Jens Wise MD;  [facility]     LAPAROSCOPY DIAGNOSTIC (GENERAL) N/A 4/17/2017    Procedure: LAPAROSCOPY DIAGNOSTIC (GENERAL);  Laparoscopic , Neck Dissection, Spit Fistula Takedown, Laparoscopic Jejunostomy Tube and Pharyngostomy Tube, Gastric Pull up, Upper Endoscopy(EGD)  , Flexible Bronchoscopy ,Sternotomy ;  Surgeon: Jens Wise MD;  [facility]     LUMPECTOMY BREAST Right 2007     NERVE BLOCK PERIPHERAL N/A 8/30/2016    Procedure: NERVE BLOCK PERIPHERAL;  Surgeon: GENERIC ANESTHESIA  "PROVIDER;  [facility]     NISSEN FUNDOPLICATION  1/2016     PHARYNGOSTOMY N/A 4/18/2016    Procedure: PHARYNGOSTOMY;  Surgeon: Alexis Barraza MD;  [facility]     PHARYNGOSTOMY N/A 4/25/2016    Procedure: PHARYNGOSTOMY;  Surgeon: Alexis Barraza MD;  [facility]     PHARYNGOSTOMY N/A 5/4/2016    Procedure: PHARYNGOSTOMY;  Surgeon: Alexis Barraza MD;  [facility]     PHARYNGOSTOMY N/A 6/22/2016    Procedure: PHARYNGOSTOMY;  Surgeon: Alexis Barraza MD;  [facility]     PHARYNGOSTOMY N/A 6/29/2016    Procedure: PHARYNGOSTOMY;  Surgeon: Jens Wise MD;  [facility]     PHARYNGOSTOMY N/A 4/21/2017    Procedure: PHARYNGOSTOMY;;  Surgeon: Jens Wise MD;  [facility]     PHARYNGOSTOMY Left 5/31/2017    Procedure: PHARYNGOSTOMY;;  Surgeon: Nalini Barreto MD;  [facility]     PICC INSERTION Left 8/25/2016    5fr DL BioFlo PICC, 42cm (3cm external) in the L medial brachial vein w/ tip in the SVC RA junction.     PICC INSERTION - Rewire Right 05/31/2017    5fr DL BioFlo PICC, 40cm (6cm external) in the R medial brachial vein w/ tip in the SVC RA junction.     THORACOTOMY Right 1998    lung infection - \"hard crust formed on lung\"     THORACOTOMY Left 1/9/2017    Procedure: THORACOTOMY;  Surgeon: Jens Wise MD;  [facility]     WRIST SURGERY         Family History   Problem Relation Age of Onset     Alzheimer Disease Mother      CEREBROVASCULAR DISEASE Father      cerebral hemorrhage     Ovarian Cancer Sister      Breast Cancer Daughter        Social History   Substance Use Topics     Smoking status: Former Smoker     Packs/day: 1.00     Years: 15.00     Types: Cigarettes     Quit date: 1/1/1998     Smokeless tobacco: Not on file     Alcohol use No       No current facility-administered medications for this encounter.      Current Outpatient Prescriptions   Medication     oxyCODONE (ROXICODONE) 5 MG IR " tablet     clotrimazole (LOTRIMIN) 1 % cream     sodium chloride, PF, (NORMAL SALINE FLUSH) 0.9% PF flush     acetaminophen (TYLENOL) 32 mg/mL solution     chlorhexidine (PERIDEX) 0.12 % solution     gabapentin (NEURONTIN) 250 MG/5ML solution     metoprolol (LOPRESSOR) 10 mg/mL SUSP     opium tincture 10 MG/ML (1%) liquid     oxyCODONE (ROXICODONE) 10 MG IR tablet     fentaNYL (DURAGESIC) 75 mcg/hr 72 hr patch     pantoprazole (PROTONIX) 40 MG EC tablet     chlorhexidine (HIBICLENS) 4 % liquid     simethicone (MYLICON) 40 MG/0.6ML suspension     diphenoxylate-atropine (LOMOTIL) 0.5-0.005 mg/mL liquid     fiber modular (NUTRISOURCE FIBER) packet     ferrous sulfate 300 (60 FE) MG/5ML syrup     traZODone (DESYREL) 100 MG tablet     order for DME     order for DME     DiphenhydrAMINE HCl (BENADRYL PO)     cholestyramine (QUESTRAN) 4 G Packet     multivitamins with minerals (CERTAVITE/CEROVITE) LIQD liquid     loperamide (IMODIUM) 1 MG/5ML liquid     Lactobacillus Rhamnosus, GG, (CULTURELLE PO)        Allergies   Allergen Reactions     Amoxicillin Diarrhea     Ativan [Lorazepam] Other (See Comments)     Hallucinations     Hydromorphone Itching     4/12/17 - patient open to using this as she tolerated Hydromorphone PCA during hospitalization in January 2017.      Morphine Itching         Review of Systems   Constitutional: Negative for chills, fatigue and fever.   HENT: Positive for facial swelling (left jaw).         Positive for left jaw pain   Respiratory: Negative for cough.    Genitourinary: Negative for dysuria and flank pain.   Musculoskeletal: Positive for neck pain.   All other systems reviewed and are negative.      Physical Exam   BP: 140/61  Pulse: 91  Heart Rate: 91  Temp: 98.4  F (36.9  C)  Weight: 42.5 kg (93 lb 11.2 oz)  SpO2: 99 %  Physical Exam   Constitutional: She appears well-developed and well-nourished.   HENT:   Head: Normocephalic and atraumatic.   Mouth/Throat: Oropharynx is clear and moist.    Eyes: Pupils are equal, round, and reactive to light.   Neck: Normal range of motion.       Cardiovascular: Regular rhythm and normal heart sounds.    Pulmonary/Chest: Effort normal. No stridor. No respiratory distress.   Skin: Skin is warm and dry. No rash noted.   Psychiatric: She has a normal mood and affect.       ED Course   2:03 PM  The patient was seen and examined by Ciaran Dean MD in Room ED17.     ED Course     Procedures           Critical Care time:  none       Labs Ordered and Resulted from Time of ED Arrival Up to the Time of Departure from the ED - No data to display         Assessments & Plan (with Medical Decision Making)   This is a 72-year-old female with history significant for esophageal perforation who yesterday underwent a pharyngostomy tube revision and EGD with stent exchange. The patient returns today to the Emergency Department for evaluation of increasing facial swelling and pain. On my evaluation, she is noted be alert, afebrile, hemodynamically stable; she does appear uncomfortable but is nontoxic. She has faint erythema and swelling on the surgical versus unaffected side predominantly in the angle of the left mandible and upper neck. There is scant thick yellow discharge at entrance site of the skin for the red rubber tube. There is no appreciable crepitus, no fluctuance or induration, no voice change or stridor.  I did discuss her case with thoracic surgery who will plan to evaluate the patient as she is currently 1 day postop; for now we ll hold on imaging with coordinated plan with primary service.    The patient was evaluated by Thoracic Surgery who feel neck appears as it did prior to procedure with recommendations for home management.  They are concerned about the over use of her narcotics and have recommended no further dosing from the ER.  She has remained hemodynamically stable and will follow up with the Thoracic team as an outpatient.    This part of the medical record  was transcribed by Fritz Garg, Medical Scribe, from a dictation done by Ciaran Dean MD.       I have reviewed the nursing notes.    I have reviewed the findings, diagnosis, plan and need for follow up with the patient.    New Prescriptions    No medications on file       Final diagnoses:   Neck pain     I, Fritz Garg, am serving as a trained medical scribe to document services personally performed by Ciaran Dean MD, based on the provider's statements to me.   I, Ciaran Dean MD, was physically present and have reviewed and verified the accuracy of this note documented by Fritz Garg.    7/18/2017   [facility], [place], EMERGENCY DEPARTMENT     Ciaran Dean MD  07/18/17 [id number]

## 2017-07-18 NOTE — ED NOTES
"Patient refused labs and PIV.  Patient said she \"doesn't think it is necessary\".  She said she doesn't want to be here if nothing is going to happen with her pharyngostomy tube. Thoracic surgery in to see patient at this time. MD, Dr Lock, okay with no labs at this time, while he talks to the rest of the team.  "

## 2017-07-18 NOTE — ED NOTES
Pt requesting script for pain meds when RN went into room to discharge pt.  MD updated.  Thoracic surgery contacted.  No further scripts given at this time.  Refused vitals upon discharge.

## 2017-07-19 ENCOUNTER — OFFICE VISIT (OUTPATIENT)
Dept: SURGERY | Facility: CLINIC | Age: 72
End: 2017-07-19
Attending: THORACIC SURGERY (CARDIOTHORACIC VASCULAR SURGERY)
Payer: MEDICARE

## 2017-07-19 ENCOUNTER — TELEPHONE (OUTPATIENT)
Dept: ANESTHESIOLOGY | Facility: CLINIC | Age: 72
End: 2017-07-19

## 2017-07-19 VITALS
WEIGHT: 92.8 LBS | HEART RATE: 83 BPM | BODY MASS INDEX: 18.22 KG/M2 | TEMPERATURE: 97.9 F | HEIGHT: 60 IN | DIASTOLIC BLOOD PRESSURE: 62 MMHG | OXYGEN SATURATION: 98 % | SYSTOLIC BLOOD PRESSURE: 109 MMHG | RESPIRATION RATE: 18 BRPM

## 2017-07-19 DIAGNOSIS — K21.9 GERD WITH STRICTURE: ICD-10-CM

## 2017-07-19 DIAGNOSIS — K22.2 GERD WITH STRICTURE: ICD-10-CM

## 2017-07-19 DIAGNOSIS — K91.89 ESOPHAGECTOMY, ANASTOMOTIC LEAK: Primary | ICD-10-CM

## 2017-07-19 PROCEDURE — 99213 OFFICE O/P EST LOW 20 MIN: CPT

## 2017-07-19 PROCEDURE — 99212 OFFICE O/P EST SF 10 MIN: CPT | Mod: ZF

## 2017-07-19 RX ORDER — OXYCODONE HCL 10 MG/1
10 TABLET, FILM COATED, EXTENDED RELEASE ORAL EVERY 4 HOURS
Qty: 42 TABLET | Refills: 0 | Status: SHIPPED | OUTPATIENT
Start: 2017-07-19 | End: 2017-07-26

## 2017-07-19 RX ORDER — CLINDAMYCIN HCL 300 MG
300 CAPSULE ORAL 3 TIMES DAILY
Qty: 21 CAPSULE | Refills: 0 | Status: SHIPPED | OUTPATIENT
Start: 2017-07-19 | End: 2017-07-26

## 2017-07-19 ASSESSMENT — PAIN SCALES - GENERAL: PAINLEVEL: EXTREME PAIN (8)

## 2017-07-19 NOTE — MR AVS SNAPSHOT
After Visit Summary   7/19/2017    Minerva Blanco    MRN: [id number]           Patient Information     Date Of Birth          1945        Visit Information        Provider Department      7/19/2017 2:30 PM Jens Wise MD [facility]        Today's Diagnoses     Esophagectomy, anastomotic leak    -  1    GERD with stricture           Follow-ups after your visit        Your next 10 appointments already scheduled     Aug 02, 2017  1:45 PM CDT   (Arrive by 1:30 PM)   Return Visit with Jens Wise MD   [facility] ([facility])    [address] [place] [address]  2nd Floor  [facility] [id number] [phone]            Aug 28, 2017  3:30 PM CDT   (Arrive by 3:15 PM)   New Patient Visit with Richard Vigil MD   [facility] ([facility])    [address] [place] [address]  4th Floor  [facility] [id number] [phone]              Who to contact     If you have questions or need follow up information about today's clinic visit or your schedule please contact [facility] directly at [phone].  Normal or non-critical lab and imaging results will be communicated to you by MyChart, letter or phone within 4 business days after the clinic has received the results. If you do not hear from us within 7 days, please contact the clinic through MyChart or phone. If you have a critical or abnormal lab result, we will notify you by phone as soon as possible.  Submit refill requests through [employer] or call your pharmacy and they will forward the refill request to us. Please allow 3 business days for your refill to be completed.          Additional Information About Your Visit        AddressHealthhart Information     [employer] lets you send messages to your doctor, view your test results, renew your prescriptions, schedule appointments and more. To  "sign up, go to [email or web address] . Click on \"Log in\" on the left side of the screen, which will take you to the Welcome page. Then click on \"Sign up Now\" on the right side of the page.     You will be asked to enter the access code listed below, as well as some personal information. Please follow the directions to create your username and password.     Your access code is: [email or web address]  Expires: 2017 11:41 AM     Your access code will  in 90 days. If you need help or a new code, please call your [facility] clinic or [phone].        Care EveryWhere ID     This is your Care EveryWhere ID. This could be used by other organizations to access your [facility] medical records  [id number]        Your Vitals Were     Pulse Temperature Respirations Height Pulse Oximetry BMI (Body Mass Index)    83 97.9  F (36.6  C) (Oral) 18 1.524 m (5') 98% 18.12 kg/m2       Blood Pressure from Last 3 Encounters:   17 119/70   17 109/62   17 140/61    Weight from Last 3 Encounters:   17 40.1 kg (88 lb 8 oz)   17 42.1 kg (92 lb 12.8 oz)   17 42.5 kg (93 lb 11.2 oz)              Today, you had the following     No orders found for display         Today's Medication Changes          These changes are accurate as of: 17 11:59 PM.  If you have any questions, ask your nurse or doctor.               Start taking these medicines.        Dose/Directions    clindamycin 300 MG capsule   Commonly known as:  CLEOCIN   Used for:  Esophagectomy, anastomotic leak   Started by:  Jens Wise MD        Dose:  300 mg   Take 1 capsule (300 mg) by mouth 3 times daily for 7 days   Quantity:  21 capsule   Refills:  0         These medicines have changed or have updated prescriptions.        Dose/Directions    loperamide 1 MG/5ML liquid   Commonly known as:  IMODIUM   This may have changed:  when to take this   Used for:  Bowel habit changes        Dose:  4 mg   Take 20 mLs (4 mg) by mouth 4 " times daily as needed for diarrhea   Quantity:  200 mL   Refills:  0       * oxyCODONE 5 MG IR tablet   Commonly known as:  ROXICODONE   This may have changed:  Another medication with the same name was added. Make sure you understand how and when to take each.   Used for:  Esophageal anastomotic leak        Dose:  5-10 mg   Take 1-2 tablets (5-10 mg) by mouth every 3 hours as needed for pain or other (Moderate to Severe)   Quantity:  20 tablet   Refills:  0       * oxyCODONE 10 MG 12 hr tablet   Commonly known as:  OXYCONTIN   This may have changed:  You were already taking a medication with the same name, and this prescription was added. Make sure you understand how and when to take each.   Used for:  Esophagectomy, anastomotic leak   Changed by:  Jens Wise MD        Dose:  10 mg   Take 1 tablet (10 mg) by mouth every 4 hours for 7 days   Quantity:  42 tablet   Refills:  0       traZODone 100 MG tablet   Commonly known as:  DESYREL   This may have changed:  how much to take   Used for:  Insomnia, unspecified type        Dose:  50 mg   0.5 tablets (50 mg) by Per G Tube route At Bedtime   Quantity:  30 tablet   Refills:  0       * Notice:  This list has 2 medication(s) that are the same as other medications prescribed for you. Read the directions carefully, and ask your doctor or other care provider to review them with you.         Where to get your medicines      Some of these will need a paper prescription and others can be bought over the counter.  Ask your nurse if you have questions.     Bring a paper prescription for each of these medications     clindamycin 300 MG capsule    oxyCODONE 10 MG 12 hr tablet                Primary Care Provider Office Phone # Fax #    Andrea Nino [phone]       [place] [address] [place] [address]        Equal Access to Services     DIANNA JORGENSEN AH: isis Up qaybta kaalmada adeegyada,  florentin valenciachavez ramos'aan ah. So [facility] [phone].    ATENCIÓN: Si habla alia, tiene a jackson disposición servicios gratuitos de asistencia lingüística. Sreekanth tee [phone].    We comply with applicable federal civil rights laws and [place] laws. We do not discriminate on the basis of race, color, national origin, age, disability sex, sexual orientation or gender identity.            Thank you!     Thank you for choosing [facility]  for your care. Our goal is always to provide you with excellent care. Hearing back from our patients is one way we can continue to improve our services. Please take a few minutes to complete the written survey that you may receive in the mail after your visit with us. Thank you!             Your Updated Medication List - Protect others around you: Learn how to safely use, store and throw away your medicines at www.disposemymeds.org.          This list is accurate as of: 7/19/17 11:59 PM.  Always use your most recent med list.                   Brand Name Dispense Instructions for use Diagnosis    acetaminophen 32 mg/mL solution    TYLENOL    300 mL    30.45 mLs (975 mg) by Per Feeding Tube route 3 times daily    Esophageal perforation       BENADRYL PO      Take 25 mg by mouth nightly as needed        chlorhexidine 0.12 % solution    PERIDEX     Swish and spit 15 mLs in mouth 2 times daily    Esophageal perforation       chlorhexidine 4 % liquid    HIBICLENS    200 mL    Apply topically 2 times daily    History of esophagectomy       cholestyramine 4 G Packet    QUESTRAN     Take 1 packet by mouth daily        clindamycin 300 MG capsule    CLEOCIN    21 capsule    Take 1 capsule (300 mg) by mouth 3 times daily for 7 days    Esophagectomy, anastomotic leak       clotrimazole 1 % cream    LOTRIMIN    12 g    Apply topically 2 times daily    Wound abscess, subsequent encounter       CULTURELLE PO      Take 1 tablet by mouth 2 times daily        fentaNYL  75 mcg/hr 72 hr patch    DURAGESIC    5 patch    Place 1 patch onto the skin every 72 hours    Esophageal perforation, Acute post-operative pain       fiber modular packet     60 packet    1 packet by Per Feeding Tube route 3 times daily (with meals)    History of esophagectomy       gabapentin 250 MG/5ML solution    NEURONTIN    550 mL    Take 6 mLs (300 mg) by mouth every 8 hours    Acute post-operative pain       loperamide 1 MG/5ML liquid    IMODIUM    200 mL    Take 20 mLs (4 mg) by mouth 4 times daily as needed for diarrhea    Bowel habit changes       metoprolol 10 mg/mL Susp    LOPRESSOR     1.25 mLs (12.5 mg) by Per J Tube route 2 times daily    Atrial fibrillation, unspecified type (H)       multivitamins with minerals Liqd liquid      Take 15 mLs by mouth daily        opium tincture 10 MG/ML (1%) liquid     118 mL    Take 0.6 mLs (6 mg) by mouth every 6 hours as needed for diarrhea or moderate pain    Bowel habit changes       * order for DME     1 Device    Equipment being ordered:  [facility] Suction Machine-Intermittent Suction Canisters(2) Suction Canister Holders(2) Suction Connect Tube(2) 5 in 1 Connector(2) Bacteria Filter(2) Yaunkauer Suction(2)  Treatment Diagnosis: Esophogeal Perforation, s/p esophagectomy    Hx of esophagectomy       * order for DME     1 Device    Equipment being ordered:  Suction Pump Suction Canister(2) Suction Tubing(2) Bacteria Filter(2) Yaunkauer(4) Red Rubber Catheter(2)  Treatment Diagnosis: Esophogeal Perforation, s/p esophagectomy    Esophageal perforation       * oxyCODONE 5 MG IR tablet    ROXICODONE    20 tablet    Take 1-2 tablets (5-10 mg) by mouth every 3 hours as needed for pain or other (Moderate to Severe)    Esophageal anastomotic leak       * oxyCODONE 10 MG 12 hr tablet    OXYCONTIN    42 tablet    Take 1 tablet (10 mg) by mouth every 4 hours for 7 days    Esophagectomy, anastomotic leak       simethicone 40 MG/0.6ML suspension    MYLICON     0.6 mLs (40 mg) by  Per Feeding Tube route 4 times daily    History of esophagectomy       sodium chloride (PF) 0.9% PF flush     1500 mL    30 mLs by Intracatheter route every 8 hours    Gastroesophageal reflux disease, esophagitis presence not specified       traZODone 100 MG tablet    DESYREL    30 tablet    0.5 tablets (50 mg) by Per G Tube route At Bedtime    Insomnia, unspecified type       * Notice:  This list has 4 medication(s) that are the same as other medications prescribed for you. Read the directions carefully, and ask your doctor or other care provider to review them with you.

## 2017-07-19 NOTE — NURSING NOTE
Oncology Rooming Note    July 19, 2017 2:35 PM   Minerva Blanco is a 72 year old female who presents for:    Chief Complaint   Patient presents with     Oncology Clinic Visit     return visit related to esophageal perferration     Initial Vitals: /62  Pulse 83  Temp 97.9  F (36.6  C) (Oral)  Resp 18  Ht 1.524 m (5')  Wt 42.1 kg (92 lb 12.8 oz)  SpO2 98%  BMI 18.12 kg/m2 Estimated body mass index is 18.12 kg/(m^2) as calculated from the following:    Height as of this encounter: 1.524 m (5').    Weight as of this encounter: 42.1 kg (92 lb 12.8 oz). Body surface area is 1.34 meters squared.  Extreme Pain (8) Comment: left neck   No LMP recorded. Patient is postmenopausal.  Allergies reviewed: Yes  Medications reviewed: Yes    Medications: Medication refills not needed today.  Pharmacy name entered into [employer]:    [id number] [place], [place] - [address] [facility] [place], [place] - [address]    Clinical concerns: Patient is having pain 8/10 in left side of neck since her tube was replaced on 7/17/17. Patient is unable to fully open mouth and is having phlegm with it, no blood present.Patient has questions regarding wound on right side of chest and what is the plan of action for that.     10 minutes for nursing intake (face to face time)     Elly Dominguez LPN

## 2017-07-19 NOTE — TELEPHONE ENCOUNTER
General surgeon Dr. Wise called for a consult of this patient. Surgeon would like to have patient be seen in the clinic and sign a pain contract and start a taper schedule of her pain medications. This RN explained that this clinic will be able to give a suggestion for taper and help control chronic pain through alternative treatments besides long term narcotic use. The Dr. Wise was given a schedule date of Aug 28th at 3:30 to give to the patient for an appointment time to be seen in the clinic. It was again reiterated that this clinic dose not generally take over the prescribing of opoid medications and he stated that the patients primary care provider would have to be in charge of writing patient opoid medication prescriptions after being seen in the clinic. This  Was also informed that this patient has had three prior scheduled appointments with the providers in the clinic ans has not attended any of the clinic visits.

## 2017-07-22 ENCOUNTER — TELEPHONE (OUTPATIENT)
Dept: OTHER | Facility: CLINIC | Age: 72
End: 2017-07-22

## 2017-07-22 NOTE — OP NOTE
Surgeon / Clinician: Jens Wise MD    Date of Surgery:  07/17/2017    Preoperative Diagnosis:  Anastomotic leak.    Postoperative Diagnosis:  Anastomotic leak.    PROCEDURES PERFORMED:    1.  Esophagogastroscopy with stent exchange (19 x 70 mm).  2.  Incision and drainage of anterior chest wound.  3.  Fluoroscopy with interpretation of images.    SURGEON:  Frandy Colindres MD (present the entire procedure)    Resident Surgeon:  Neri Thompson MD    ANESTHESIA:  General.    ESTIMATED BLOOD LOSS:  2 mL.    COMPLICATIONS:  None.    FINDINGS:  The opening in the esophagus has decreased in size quite dramatically from being about a nickel size to now being more pinpoint size.  There is still a communication with the wound but on upon replacing the stent, there was no bubbling in the wound.    Description of Procedure:  With the patient in supine position, general anesthesia was performed and esophagogastroscopy and removed the previous stent.  Then under endoscopy we identified the above-mentioned findings.  We debrided the wound some and then placed a wire and replaced the stent with fluoroscopic guidance.    The patient tolerated procedure well.        Jens Wise MD    D:  07/18/2017 18:51 T:  07/22/2017 08:31  Document:  [id number] NADEGE\MARIO

## 2017-07-23 NOTE — TELEPHONE ENCOUNTER
Spoke on the phone with patient's  Richard. He notes Minerva has had continued neck pain due to the pharyngostomy tube, along with concern for small amounts of leakage around the tube site. No significant erythema around pharyngostomy tube, no fevers/chills.     I discussed that it was reasonable to call Dr. Wise's clinic on Monday for follow-up on the pharyngostomy tube, notably she has an appointment already scheduled for Wednesday. I discussed that it would be reasonable to present to nearest ED for signs of increasing infection such as fevers, chills, increasing erythema around the tube or significant purulent output. Patient's  is in agreement with this plan, of note patient herself is sleeping at the moment of the call.

## 2017-07-24 ENCOUNTER — TELEPHONE (OUTPATIENT)
Dept: ONCOLOGY | Facility: CLINIC | Age: 72
End: 2017-07-24

## 2017-07-24 NOTE — TELEPHONE ENCOUNTER
Spouse Enrique reporting that patient's pharyngostomy tube is causing severe pain 8-9/10 at site and L side neck. Since tube was replaced on 7/17, pain has persisted with minimal relief from oral oxycodone. Yesterday, saw scant red blood around tube site. The swelling and drainage from tube that was noted in the ER 7/18 has subsided with antibiotic use. Skin around site is slightly pink. No fevers or chills.    Discussed with Silvia DUFFY. There is no one in clinic today who can assess tube. Pt to go to ER for concerns of pain related to tube. May also call pain management clinic for recommendations, though first appointment with pain clinic is not until 8/28. Information relayed to spouse Enrique, who verbalized frustration but stated they will go back to ER.

## 2017-07-25 ENCOUNTER — TELEPHONE (OUTPATIENT)
Dept: ANESTHESIOLOGY | Facility: CLINIC | Age: 72
End: 2017-07-25

## 2017-07-25 NOTE — TELEPHONE ENCOUNTER
Pt is scheduled to come and see Dr. Vigil on 8/28/17 at 3:30. Per recent changes in the schedule it was asked if pt was able to come in at 3:50 pm instead. Pt was agreeable, and reminded to arrive 15 minutes earlier to fill out their questionnaires.     Lexy Newell LPN

## 2017-07-26 ENCOUNTER — OFFICE VISIT (OUTPATIENT)
Dept: SURGERY | Facility: CLINIC | Age: 72
End: 2017-07-26
Attending: THORACIC SURGERY (CARDIOTHORACIC VASCULAR SURGERY)
Payer: MEDICARE

## 2017-07-26 ENCOUNTER — TELEPHONE (OUTPATIENT)
Dept: SURGERY | Facility: CLINIC | Age: 72
End: 2017-07-26

## 2017-07-26 VITALS
RESPIRATION RATE: 20 BRPM | OXYGEN SATURATION: 100 % | WEIGHT: 88.5 LBS | TEMPERATURE: 97 F | SYSTOLIC BLOOD PRESSURE: 119 MMHG | DIASTOLIC BLOOD PRESSURE: 70 MMHG | HEART RATE: 74 BPM | BODY MASS INDEX: 17.28 KG/M2

## 2017-07-26 DIAGNOSIS — D64.89 ANEMIA DUE TO OTHER CAUSE: ICD-10-CM

## 2017-07-26 DIAGNOSIS — K91.89 ESOPHAGECTOMY, ANASTOMOTIC LEAK: Primary | ICD-10-CM

## 2017-07-26 DIAGNOSIS — G89.18 ACUTE POST-OPERATIVE PAIN: ICD-10-CM

## 2017-07-26 DIAGNOSIS — K21.9 GERD WITH STRICTURE: ICD-10-CM

## 2017-07-26 DIAGNOSIS — K22.2 GERD WITH STRICTURE: ICD-10-CM

## 2017-07-26 DIAGNOSIS — K21.9 GASTROESOPHAGEAL REFLUX DISEASE, ESOPHAGITIS PRESENCE NOT SPECIFIED: ICD-10-CM

## 2017-07-26 DIAGNOSIS — R19.4 BOWEL HABIT CHANGES: ICD-10-CM

## 2017-07-26 PROCEDURE — 99212 OFFICE O/P EST SF 10 MIN: CPT | Mod: ZF

## 2017-07-26 PROCEDURE — 99213 OFFICE O/P EST LOW 20 MIN: CPT

## 2017-07-26 RX ORDER — PANTOPRAZOLE SODIUM 40 MG/1
TABLET, DELAYED RELEASE ORAL
Qty: 30 TABLET | Refills: 0 | Status: SHIPPED | OUTPATIENT
Start: 2017-07-26 | End: 2018-02-14

## 2017-07-26 RX ORDER — DIPHENOXYLATE HCL/ATROPINE 2.5-.025/5
5 LIQUID (ML) ORAL 4 TIMES DAILY PRN
Qty: 300 ML | Refills: 0 | Status: ON HOLD | OUTPATIENT
Start: 2017-07-26 | End: 2017-11-03

## 2017-07-26 RX ORDER — OXYCODONE HYDROCHLORIDE 10 MG/1
10 TABLET ORAL EVERY 4 HOURS PRN
Qty: 42 TABLET | Refills: 0 | Status: SHIPPED | OUTPATIENT
Start: 2017-07-26 | End: 2017-08-02

## 2017-07-26 ASSESSMENT — PAIN SCALES - GENERAL: PAINLEVEL: EXTREME PAIN (8)

## 2017-07-26 NOTE — LETTER
7/26/2017      RE: Minerva Blanco  [address]   [facility] [address]       THORACIC SURGERY FOLLOW UP VISIT      Dear Dr. Carroll,  I saw . Minerva Blanco in follow-up today. The clinical summary follows:      PREOP DIAGNOSIS   1.  Chronic esophageal perforation with mediastinal phlegmon.    2.  Status post fundoplication.        PROCEDURE   1/9/2017  1.  Esophagogastroscopy.    2.  Laparoscopic lysis of adhesions.    3.  Laparoscopic proximal gastrectomy and gastrostomy tube placement.    4.  Left thoracotomy with excision of mediastinal phlegmon and distal esophagectomy.    5.  Thoracic duct ligation.    6.  Right tube thoracostomy.    7.  Left esophageal spit fistula.    8.  Bronchoscopy.       6/9/2017  1. Esophagogastroscopy  2. Esophageal stent placement    7/17/2017  1. Esophagogastroduodenoscopy with stent exchange  2. Pharyngostomy tube revision      PRIOR PROCEDURES  1) Multiple endoscopies and pharyngostomy tube placement x 2 (for drainage of paraesophageal cavity)  2) Esophageal stent placement, attempted placement of biliary stent into the paraesophageal cavity (7/22/2016)  3) Esophageal stent removal, placement of retrograde gastroesophageal tube (10/23/2016)  4) Toupet fundoplication (1/2016)          COMPLICATIONS  Thoracotomy wound infection requiring antibiotics      INTERVAL STUDIES  None      TOB never  ETOH negative      SUBJECTIVE  She returns for persistent pain around her pharyngostomy site. She says it is the worst it has even hurt. She is unable to open her mouth and using 15mg oxycodone q4hr, which she was prescribed 10mg Q4hr. She is also having diarrhea with the antibiotics, and has lost 4 lbs. Objectively, her pharyngostomy site is clean, with no cellulitis or infection, J tube is in place and site is clean. Chest wound is granulating well with a small amount of exudative output, and a silk suture visible at the base.    The pharyngostomy tube is not draining  anything. Minimal occasional bubbling from wound.    From a personal perspective, she is here with her  Enrique.          IMPRESSION   (K91.89) Esophagectomy, anastomotic leak  (primary encounter diagnosis)  (G89.18) Acute post-operative pain  (K21.9,  K22.2) GERD with stricture        72 year-old female S/P retrosternal gastric pull-through complicated by anastomotic leak, now with esophageal stent  Pain related to pharyngostomy          PLAN  I spent a total of 15 minutes with Ms. Minerva Blanco and Enrique, more than 50% of which were spent in counseling, coordination of care, and face-to-face time. I reviewed the plan as follows:  1) Remove pharyngostomy tube today  2) Only ice chips and sips  3) Prescription for 1 week's supply of oxycodone 10 mg PO q 4h  4) Follow-up in clinic in 1 week  5) Schedule for stent revision on Friday August 4th    They had all their questions answered and were in agreement with the plan.  I appreciate the opportunity to participate in the care of your patient and will keep you updated.  Sincerely,    Jens Wise MD

## 2017-07-26 NOTE — PROGRESS NOTES
THORACIC SURGERY FOLLOW UP VISIT      Dear Dr. Carroll,  I saw Mrs. Minerva Blanco in follow-up today. The clinical summary follows:      PREOP DIAGNOSIS   1.  Chronic esophageal perforation with mediastinal phlegmon.    2.  Status post fundoplication.        PROCEDURE   1/9/2017  1.  Esophagogastroscopy.    2.  Laparoscopic lysis of adhesions.    3.  Laparoscopic proximal gastrectomy and gastrostomy tube placement.    4.  Left thoracotomy with excision of mediastinal phlegmon and distal esophagectomy.    5.  Thoracic duct ligation.    6.  Right tube thoracostomy.    7.  Left esophageal spit fistula.    8.  Bronchoscopy.       6/9/2017  1. Esophagogastroscopy  2. Esophageal stent placement    7/17/2017  1. Esophagogastroduodenoscopy with stent exchange  2. Pharyngostomy tube revision      PRIOR PROCEDURES  1) Multiple endoscopies and pharyngostomy tube placement x 2 (for drainage of paraesophageal cavity)  2) Esophageal stent placement, attempted placement of biliary stent into the paraesophageal cavity (7/22/2016)  3) Esophageal stent removal, placement of retrograde gastroesophageal tube (10/23/2016)  4) Toupet fundoplication (1/2016)          COMPLICATIONS  Thoracotomy wound infection requiring antibiotics      INTERVAL STUDIES  None      TOB never  ETOH negative      SUBJECTIVE  She returns for persistent pain around her pharyngostomy site. She says it is the worst it has even hurt. She is unable to open her mouth and using 15mg oxycodone q4hr, which she was prescribed 10mg Q4hr. She is also having diarrhea with the antibiotics, and has lost 4 lbs. Objectively, her pharyngostomy site is clean, with no cellulitis or infection, J tube is in place and site is clean. Chest wound is granulating well with a small amount of exudative output, and a silk suture visible at the base.    The pharyngostomy tube is not draining anything. Minimal occasional bubbling from wound.    From a personal perspective, she is here with  her  Enrique.          IMPRESSION   (K91.89) Esophagectomy, anastomotic leak  (primary encounter diagnosis)  (G89.18) Acute post-operative pain  (K21.9,  K22.2) GERD with stricture        72 year-old female S/P retrosternal gastric pull-through complicated by anastomotic leak, now with esophageal stent  Pain related to pharyngostomy          PLAN  I spent a total of 15 minutes with Ms. Minerva Blanco and Enrique, more than 50% of which were spent in counseling, coordination of care, and face-to-face time. I reviewed the plan as follows:  1) Remove pharyngostomy tube today  2) Only ice chips and sips  3) Prescription for 1 week's supply of oxycodone 10 mg PO q 4h  4) Follow-up in clinic in 1 week  5) Schedule for stent revision on Friday August 4th    They had all their questions answered and were in agreement with the plan.  I appreciate the opportunity to participate in the care of your patient and will keep you updated.  Sincerely,

## 2017-07-26 NOTE — NURSING NOTE
Oncology Rooming Note    July 26, 2017 8:51 AM   Minerva Blanco is a 72 year old female who presents for:    Chief Complaint   Patient presents with     Oncology Clinic Visit     return visit related to esophageal perferation     Initial Vitals: /70  Pulse 74  Temp 97  F (36.1  C) (Oral)  Resp 20  Wt 40.1 kg (88 lb 8 oz)  SpO2 100%  BMI 17.28 kg/m2 Estimated body mass index is 17.28 kg/(m^2) as calculated from the following:    Height as of 7/19/17: 1.524 m (5').    Weight as of this encounter: 40.1 kg (88 lb 8 oz). Body surface area is 1.3 meters squared.  Extreme Pain (8) Comment: left side of neck and jaw   No LMP recorded. Patient is postmenopausal.  Allergies reviewed: Yes  Medications reviewed: Yes    Medications: MEDICATION REFILLS NEEDED TODAY. Provider was notified.  Pharmacy name entered into [employer]:    [id number] [place], [place] - [address] [facility] [place], [place] - [address]    Clinical concerns: Patient would like refills on protonix, ndiphenoxylate- atropi, and iron pills please.  Provider was notified.    10 minutes for nursing intake (face to face time)     Elly Dominguez LPN

## 2017-07-26 NOTE — Clinical Note
Heben, She needs to see Rangel in clinic next week please. She is in room 30 waiting to schedule. Thanks H

## 2017-07-26 NOTE — TELEPHONE ENCOUNTER
I spoke with Jim, Nicholas's , about her upcoming procedure on 8/4. He understands that shee needs to be at [facility] on [address] at 7:40am on 8/4 for a procedure with Dr. Barreto.

## 2017-07-26 NOTE — PROGRESS NOTES
THORACIC SURGERY FOLLOW UP VISIT      Dear Dr. Carroll,  I saw . Minerva Blanco in follow-up today. The clinical summary follows:      PREOP DIAGNOSIS   1.  Chronic esophageal perforation with mediastinal phlegmon.    2.  Status post fundoplication.        PROCEDURE   1/9/2017  1.  Esophagogastroscopy.    2.  Laparoscopic lysis of adhesions.    3.  Laparoscopic proximal gastrectomy and gastrostomy tube placement.    4.  Left thoracotomy with excision of mediastinal phlegmon and distal esophagectomy.    5.  Thoracic duct ligation.    6.  Right tube thoracostomy.    7.  Left esophageal spit fistula.    8.  Bronchoscopy.       6/9/2017  1. Esophagogastroscopy  2. Esophageal stent placement    7/17/2017  1. Esophagogastroduodenoscopy with stent exchange  2. Pharyngostomy tube revision      PRIOR PROCEDURES  1) Multiple endoscopies and pharyngostomy tube placement x 2 (for drainage of paraesophageal cavity)  2) Esophageal stent placement, attempted placement of biliary stent into the paraesophageal cavity (7/22/2016)  3) Esophageal stent removal, placement of retrograde gastroesophageal tube (10/23/2016)  4) Toupet fundoplication (1/2016)          COMPLICATIONS  Thoracotomy wound infection requiring antibiotics      INTERVAL STUDIES  None      TOB never  ETOH negative      SUBJECTIVE  She returns for persistent pain around her pharyngostomy site, but there are no signs of infection.    From a personal perspective, she is here with her  Enrique.          IMPRESSION   (K91.89) Esophagectomy, anastomotic leak  (primary encounter diagnosis)  (K21.9,  K22.2) GERD with stricture        72 year-old female S/P retrosternal gastric pull-through complicated by anastomotic leak, now with esophageal stent  Pain related to pharyngostomy          PLAN  I spent a total of 15 minutes with Ms. Minerva Blanco and Enrique, more than 50% of which were spent in counseling, coordination of care, and face-to-face time. I reviewed the  plan as follows:  1) Pharyngostomy tube for 1 more week  2) Only ice chips and sips  3) Prescription for 1 week's supply of oxycodone 10 mg PO q 4h  4) Follow-up in clinic in 1 week    They had all their questions answered and were in agreement with the plan.  I appreciate the opportunity to participate in the care of your patient and will keep you updated.  Sincerely,

## 2017-07-26 NOTE — MR AVS SNAPSHOT
After Visit Summary   7/26/2017    Minerva Blanco    MRN: [id number]           Patient Information     Date Of Birth          1945        Visit Information        Provider Department      7/26/2017 9:00 AM Jens Wise MD [facility]        Today's Diagnoses     Esophagectomy, anastomotic leak    -  1    Acute post-operative pain        GERD with stricture        Gastroesophageal reflux disease, esophagitis presence not specified        Bowel habit changes        Anemia due to other cause           Follow-ups after your visit        Your next 10 appointments already scheduled     Aug 02, 2017  1:45 PM CDT   (Arrive by 1:30 PM)   Return Visit with Jens Wise MD   [facility] ([facility])    [address]  2nd [address]  [place] [facility] [address] [phone]            Aug 04, 2017   Procedure with Nalini Barreto MD   [facility] [place] Same Day Surgery (--)    [address] [place] [id number] [phone]            Aug 28, 2017  3:30 PM CDT   (Arrive by 3:15 PM)   New Patient Visit with Richard Vigil MD   [facility] ([facility])    [address]  4th [address]  [facility] [id number] [phone]              Who to contact     If you have questions or need follow up information about today's clinic visit or your schedule please contact [facility] directly at [phone].  Normal or non-critical lab and imaging results will be communicated to you by MyChart, letter or phone within 4 business days after the clinic has received the results. If you do not hear from us within 7 days, please contact the clinic through MyChart or phone. If you have a critical or abnormal lab result, we will notify you by phone as soon as possible.  Submit refill requests through [employer] or call your pharmacy and  "they will forward the refill request to us. Please allow 3 business days for your refill to be completed.          Additional Information About Your Visit        [facility] Information     [employer] lets you send messages to your doctor, view your test results, renew your prescriptions, schedule appointments and more. To sign up, go to [email or web address] . Click on \"Log in\" on the left side of the screen, which will take you to the Welcome page. Then click on \"Sign up Now\" on the right side of the page.     You will be asked to enter the access code listed below, as well as some personal information. Please follow the directions to create your username and password.     Your access code is: [email or web address]  Expires: 2017 11:41 AM     Your access code will  in 90 days. If you need help or a new code, please call your [facility] clinic or [phone].        Care EveryWhere ID     This is your Care EveryWhere ID. This could be used by other organizations to access your [facility] medical records  [id number]        Your Vitals Were     Pulse Temperature Respirations Pulse Oximetry BMI (Body Mass Index)       74 97  F (36.1  C) (Oral) 20 100% 17.28 kg/m2        Blood Pressure from Last 3 Encounters:   17 119/70   17 109/62   17 140/61    Weight from Last 3 Encounters:   17 40.1 kg (88 lb 8 oz)   17 42.1 kg (92 lb 12.8 oz)   17 42.5 kg (93 lb 11.2 oz)              We Performed the Following     Shirley-Operative Worksheet (Thoracic)          Today's Medication Changes          These changes are accurate as of: 17 10:09 AM.  If you have any questions, ask your nurse or doctor.               These medicines have changed or have updated prescriptions.        Dose/Directions    loperamide 1 MG/5ML liquid   Commonly known as:  IMODIUM   This may have changed:  when to take this   Used for:  Bowel habit changes        Dose:  4 mg   Take 20 mLs (4 mg) by mouth 4 times daily as needed " for diarrhea   Quantity:  200 mL   Refills:  0       traZODone 100 MG tablet   Commonly known as:  DESYREL   This may have changed:  how much to take   Used for:  Insomnia, unspecified type        Dose:  50 mg   0.5 tablets (50 mg) by Per G Tube route At Bedtime   Quantity:  30 tablet   Refills:  0            Where to get your medicines      Some of these will need a paper prescription and others can be bought over the counter.  Ask your nurse if you have questions.     Bring a paper prescription for each of these medications     diphenoxylate-atropine 2.5-0.025 MG/5ML liquid    ferrous sulfate 300 (60 FE) MG/5ML syrup    oxyCODONE 10 MG IR tablet    pantoprazole 40 MG EC tablet                Primary Care Provider Office Phone # Fax #    Andrea Nino [phone]       [facility] [address] [address]        Equal Access to Services     [facility] : Hadkaye Meng, wagillian romero, qainata kaalmada monica, florentin amaya . So [facility] [phone].    ATENCIÓN: Si habla español, tiene a jackson disposición servicios gratuitos de asistencia lingüística. Llame al [phone].    We comply with applicable federal civil rights laws and [place] laws. We do not discriminate on the basis of race, color, national origin, age, disability sex, sexual orientation or gender identity.            Thank you!     Thank you for choosing [facility]  for your care. Our goal is always to provide you with excellent care. Hearing back from our patients is one way we can continue to improve our services. Please take a few minutes to complete the written survey that you may receive in the mail after your visit with us. Thank you!             Your Updated Medication List - Protect others around you: Learn how to safely use, store and throw away your medicines at www.disposemymeds.org.          This list is accurate as of: 7/26/17 10:09  AM.  Always use your most recent med list.                   Brand Name Dispense Instructions for use Diagnosis    acetaminophen 32 mg/mL solution    TYLENOL    300 mL    30.45 mLs (975 mg) by Per Feeding Tube route 3 times daily    Esophageal perforation       BENADRYL PO      Take 25 mg by mouth nightly as needed        chlorhexidine 0.12 % solution    PERIDEX     Swish and spit 15 mLs in mouth 2 times daily    Esophageal perforation       chlorhexidine 4 % liquid    HIBICLENS    200 mL    Apply topically 2 times daily    History of esophagectomy       cholestyramine 4 G Packet    QUESTRAN     Take 1 packet by mouth daily        clindamycin 300 MG capsule    CLEOCIN    21 capsule    Take 1 capsule (300 mg) by mouth 3 times daily for 7 days    Esophagectomy, anastomotic leak       clotrimazole 1 % cream    LOTRIMIN    12 g    Apply topically 2 times daily    Wound abscess, subsequent encounter       CULTURELLE PO      Take 1 tablet by mouth 2 times daily        diphenoxylate-atropine 2.5-0.025 MG/5ML liquid    LOMOTIL    300 mL    Take 5 mLs by mouth 4 times daily as needed for diarrhea    Bowel habit changes       fentaNYL 75 mcg/hr 72 hr patch    DURAGESIC    5 patch    Place 1 patch onto the skin every 72 hours    Esophageal perforation, Acute post-operative pain       ferrous sulfate 300 (60 FE) MG/5ML syrup     150 mL    5 mLs (300 mg) by Per J Tube route daily    Anemia due to other cause       fiber modular packet     60 packet    1 packet by Per Feeding Tube route 3 times daily (with meals)    History of esophagectomy       gabapentin 250 MG/5ML solution    NEURONTIN    550 mL    Take 6 mLs (300 mg) by mouth every 8 hours    Acute post-operative pain       loperamide 1 MG/5ML liquid    IMODIUM    200 mL    Take 20 mLs (4 mg) by mouth 4 times daily as needed for diarrhea    Bowel habit changes       metoprolol 10 mg/mL Susp    LOPRESSOR     1.25 mLs (12.5 mg) by Per J Tube route 2 times daily    Atrial  fibrillation, unspecified type (H)       multivitamins with minerals Liqd liquid      Take 15 mLs by mouth daily        opium tincture 10 MG/ML (1%) liquid     118 mL    Take 0.6 mLs (6 mg) by mouth every 6 hours as needed for diarrhea or moderate pain    Bowel habit changes       * order for DME     1 Device    Equipment being ordered[place]  [facility]-Intermittent Suction Canisters(2) Suction Canister Holders(2) Suction Connect Tube(2) 5 in 1 Connector(2) Bacteria Filter(2) [employer]auer Suction(2)  Treatment Diagnosis: Esophogeal Perforation, s/p esophagectomy    Hx of esophagectomy       * order for DME     1 Device    Equipment being ordered:  Suction Pump Suction Canister(2) Suction Tubing(2) Bacteria Filter(2) Yaunkauer(4) Red Rubber Catheter(2)  Treatment Diagnosis: Esophogeal Perforation, s/p esophagectomy    Esophageal perforation       * oxyCODONE 5 MG IR tablet    ROXICODONE    20 tablet    Take 1-2 tablets (5-10 mg) by mouth every 3 hours as needed for pain or other (Moderate to Severe)    Esophageal anastomotic leak       * oxyCODONE 10 MG 12 hr tablet    OXYCONTIN    42 tablet    Take 1 tablet (10 mg) by mouth every 4 hours for 7 days    Esophagectomy, anastomotic leak       * oxyCODONE 10 MG IR tablet    ROXICODONE    42 tablet    Take 1 tablet (10 mg) by mouth every 4 hours as needed for moderate to severe pain    Acute post-operative pain       pantoprazole 40 MG EC tablet    PROTONIX    30 tablet    Take by mouth 30-60 minutes before a meal.    Gastroesophageal reflux disease, esophagitis presence not specified       simethicone 40 MG/0.6ML suspension    MYLICON     0.6 mLs (40 mg) by Per Feeding Tube route 4 times daily    History of esophagectomy       sodium chloride (PF) 0.9% PF flush     1500 mL    30 mLs by Intracatheter route every 8 hours    Gastroesophageal reflux disease, esophagitis presence not specified       traZODone 100 MG tablet    DESYREL    30 tablet    0.5 tablets (50 mg) by  Per G Tube route At Bedtime    Insomnia, unspecified type       * Notice:  This list has 5 medication(s) that are the same as other medications prescribed for you. Read the directions carefully, and ask your doctor or other care provider to review them with you.

## 2017-08-01 NOTE — OP NOTE
Provider:  Nalini Barreto MD ([id number])  Patient:  FAVIAN MALONE   MRN:  [id number]    WORK TYPE:  Procedure note    DATE OF PROCEDURE:  06/27/2017    PREOPERATIVE DIAGNOSIS:  Anastomotic leak, status post esophagectomy temporized with stent.      POSTOPERATIVE DIAGNOSIS:  Anastomotic leak, status post esophagectomy temporized with stent.      OPERATING SURGEON:  Dr. Nalini Barreto    ASSISTING SURGEON:  Dr. Viki Carmen    PROCEDURE PERFORMED:  Esophagogastroduodenoscopy, stent removal and stent exchange with fluoroscopic interpretation of images and repositioning of pharyngoscopy tube.     DETAILS OF PROCEDURE:  After obtaining informed consent, the patient was brought to the operating room and laid supine on the operating table.  After the induction of general anesthesia and introduction of an endotracheal tube, an EGD was performed.  The adult gastroscope was easily passed through the stent into the conduit up to the pylorus.  After this, with fluoroscopic visualization, the existing stent was removed.  The esophagus and conduit and the anastomosis were reexamined again.  The anastomosis at 26 cm with the small defect that was much improved compared to the previous stent was noted; however, there was a definitive communication with some _____ in the chest wound on insufflation.  At this point, we decided to replace the stent.  A 19 x 120 EndoMAXX stent was then deployed centered over the defect with fluoroscopic guidance.  A repeat endoscopy then confirmed adequate positioning and placement of the stent.  After this, we proceeded to pass a wire through the scope and thread a new 18-Irish pharyngostomy over this to lay in the conduit, and this was then brought out through the previous pharyngostomy insertion and sutured to the skin.  The final position was confirmed with fluoroscopy.  The patient tolerated the procedure well.

## 2017-08-02 ENCOUNTER — RECORDS - HEALTHEAST (OUTPATIENT)
Dept: ADMINISTRATIVE | Facility: OTHER | Age: 72
End: 2017-08-02

## 2017-08-02 ENCOUNTER — OFFICE VISIT (OUTPATIENT)
Dept: SURGERY | Facility: CLINIC | Age: 72
End: 2017-08-02
Attending: THORACIC SURGERY (CARDIOTHORACIC VASCULAR SURGERY)
Payer: MEDICARE

## 2017-08-02 VITALS
DIASTOLIC BLOOD PRESSURE: 73 MMHG | OXYGEN SATURATION: 98 % | HEART RATE: 75 BPM | TEMPERATURE: 98.3 F | BODY MASS INDEX: 16.88 KG/M2 | RESPIRATION RATE: 18 BRPM | HEIGHT: 60 IN | SYSTOLIC BLOOD PRESSURE: 123 MMHG | WEIGHT: 85.98 LBS

## 2017-08-02 DIAGNOSIS — K91.89 ESOPHAGEAL ANASTOMOTIC LEAK: ICD-10-CM

## 2017-08-02 PROCEDURE — 99213 OFFICE O/P EST LOW 20 MIN: CPT

## 2017-08-02 PROCEDURE — 99212 OFFICE O/P EST SF 10 MIN: CPT | Mod: ZF

## 2017-08-02 RX ORDER — OXYCODONE HYDROCHLORIDE 5 MG/1
5 TABLET ORAL EVERY 6 HOURS PRN
Qty: 120 TABLET | Refills: 0 | Status: ON HOLD | OUTPATIENT
Start: 2017-08-02 | End: 2017-08-04

## 2017-08-02 RX ORDER — DIPHENOXYLATE HCL/ATROPINE 2.5-.025MG
1 TABLET ORAL 4 TIMES DAILY PRN
Qty: 40 TABLET | Refills: 1 | Status: ON HOLD | OUTPATIENT
Start: 2017-08-02 | End: 2017-09-21

## 2017-08-02 ASSESSMENT — PAIN SCALES - GENERAL: PAINLEVEL: MODERATE PAIN (5)

## 2017-08-02 NOTE — PROGRESS NOTES
Pre-Operative History and Physical    Minerva Blanco 72 year old  1945                                                                        [id number]                                                                                         Andrea Nino                                                                                             HPI:  Minerva is a 72 year old female with PMH of atrial fibrillation, breast cancer s/p lumpectomy 2007, fibromyalgia, GERD, IBS, meniere's disease, MS and symptomatic hiatal hernia. S/p Toupet fundoplication 1/2016 c/b esophageal perforation with mediastinal phlegmon s/p initial proximal gastrectomy, distal esophagectomy, spit fistula creation, left thoracotomy with mediastinal phlegmon excision on 1/9/2017. Since that time, she has undergone mulptiple procedures for management including esophageal dilations, stent placement/removal, washouts, lysis of adhesions, jejunostomy feeding tube placement, retrosternal gastric pull-through, resection of left half of the manubrium, spit fistula takedown, right tube thoracostomy and pharyngostomy tube placement. She most recently was admitted 5/18 and diagnosed with anastomic leak with mediastical abscess. S/p mediastinal wound debridement with chest tube placement (5/19) with subsequent pharyngostomy tube 5/31, stent placement 6/4 with exchange 6/27 and multiple washouts (6/2, 6/4, 6/9, 6/14, 6/27). Hospital course complicated by diarrhea (r/t TFs), surgical pain (seen by palliative). She has undergone multiple EGD and stent revisions.    Patient Active Problem List    Diagnosis Date Noted     Esophageal anastomotic leak 05/19/2017     Priority: Medium     Neck abscess 05/18/2017     Priority: Medium     Pneumonia 05/03/2017     Priority: Medium     Advance Care Planning 05/03/2017     Priority: Medium     Advance Care Planning 5/3/2017: Receipt of ACP document:  Received: Health Care Directive which was witnessed or  notarized on 4/28/2015.  Document previously scanned on 2/1/2017.  Validation form completed and scanned.  Code Status reflects choices in most recent ACP document. Confirmed/documented designated decision maker(s).  Added by Caterina Bingham MSW Advance Care Planning Liaison       Hx of esophagectomy 04/17/2017     Priority: Medium     History of esophagectomy 04/17/2017     Priority: Medium     Long-term (current) use of anticoagulants [Z79.01] 02/22/2017     Priority: Medium     Atrial fibrillation (H) [I48.91] 02/22/2017     Priority: Medium     Paroxysmal atrial fibrillation (H) 02/21/2017     Priority: Medium     Gastroesophageal reflux disease, esophagitis presence not specified 10/21/2016     Priority: Medium     Mediastinitis 08/28/2016     Priority: Medium     Esophageal stricture 08/25/2016     Priority: Medium     Esophageal perforation 04/18/2016     Priority: Medium     Abscess 04/16/2016     Priority: Medium     Past Medical History:   Diagnosis Date     Atrial fibrillation (H)      Breast cancer (H) 2007    right side - lumpectomy, chemo, and local radiation     Chronic infection     infection/wound for two years after esoph surgery     Esophageal perforation      Fibromyalgia      GERD (gastroesophageal reflux disease)      Hiatal hernia      History of blood transfusion      IBS (irritable bowel syndrome)      Meniere's disease     deaf left ear     MS (multiple sclerosis) (H)      Noninfectious ileitis      Other chronic pain      Current Outpatient Prescriptions   Medication Sig Dispense Refill     oxyCODONE (ROXICODONE) 5 MG IR tablet Take 1 tablet (5 mg) by mouth every 6 hours as needed for pain or other (Moderate to Severe) 120 tablet 0     diphenoxylate-atropine (LOMOTIL) 2.5-0.025 MG per tablet Take 1 tablet by mouth 4 times daily as needed for diarrhea 40 tablet 1     oxyCODONE (ROXICODONE) 10 MG IR tablet Take 1 tablet (10 mg) by mouth every 4 hours as needed for moderate to severe pain 42  tablet 0     pantoprazole (PROTONIX) 40 MG EC tablet Take by mouth 30-60 minutes before a meal. 30 tablet 0     diphenoxylate-atropine (LOMOTIL) 2.5-0.025 MG/5ML liquid Take 5 mLs by mouth 4 times daily as needed for diarrhea 300 mL 0     ferrous sulfate 300 (60 FE) MG/5ML syrup 5 mLs (300 mg) by Per J Tube route daily 150 mL 0     acetaminophen (TYLENOL) 32 mg/mL solution 30.45 mLs (975 mg) by Per Feeding Tube route 3 times daily 300 mL      gabapentin (NEURONTIN) 250 MG/5ML solution Take 6 mLs (300 mg) by mouth every 8 hours 550 mL 0     fiber modular (NUTRISOURCE FIBER) packet 1 packet by Per Feeding Tube route 3 times daily (with meals) 60 packet 0     traZODone (DESYREL) 100 MG tablet 0.5 tablets (50 mg) by Per G Tube route At Bedtime (Patient taking differently: 100 mg by Per G Tube route At Bedtime ) 30 tablet 0     [place] for DME Equipment being [place]   [facility] [place]  Suction Canisters(2)  Suction Canister Holders(2)  Suction Connect Tube(2)  5 in 1 Connector(2)  Bacteria Filter(2)  Yaunkauer Suction(2)    Treatment Diagnosis: Esophogeal Perforation, s/p esophagectomy 1 Device 0     order for DME Equipment being ordered:   Suction Pump  Suction Canister(2)  Suction Tubing(2)  Bacteria Filter(2)  Yaunkauer(4)  Red Rubber Catheter(2)    Treatment Diagnosis: Esophogeal Perforation, s/p esophagectomy 1 Device 0     DiphenhydrAMINE HCl (BENADRYL PO) Take 25 mg by mouth nightly as needed       cholestyramine (QUESTRAN) 4 G Packet Take 1 packet by mouth daily       multivitamins with minerals (CERTAVITE/CEROVITE) LIQD liquid Take 15 mLs by mouth daily       loperamide (IMODIUM) 1 MG/5ML liquid Take 20 mLs (4 mg) by mouth 4 times daily as needed for diarrhea (Patient taking differently: Take 4 mg by mouth 2 times daily ) 200 mL      Lactobacillus Rhamnosus, GG, (CULTURELLE PO) Take 1 tablet by mouth 2 times daily        clotrimazole (LOTRIMIN) 1 % cream Apply topically 2 times daily (Patient  not taking: Reported on 8/2/2017) 12 g 1     sodium chloride, PF, (NORMAL SALINE FLUSH) 0.9% PF flush 30 mLs by Intracatheter route every 8 hours (Patient not taking: Reported on 8/2/2017) 1500 mL 0     chlorhexidine (PERIDEX) 0.12 % solution Swish and spit 15 mLs in mouth 2 times daily       metoprolol (LOPRESSOR) 10 mg/mL SUSP 1.25 mLs (12.5 mg) by Per J Tube route 2 times daily       opium tincture 10 MG/ML (1%) liquid Take 0.6 mLs (6 mg) by mouth every 6 hours as needed for diarrhea or moderate pain (Patient not taking: Reported on 8/2/2017) 118 mL 0     fentaNYL (DURAGESIC) 75 mcg/hr 72 hr patch Place 1 patch onto the skin every 72 hours (Patient not taking: Reported on 8/2/2017) 5 patch 0     chlorhexidine (HIBICLENS) 4 % liquid Apply topically 2 times daily (Patient not taking: Reported on 8/2/2017) 200 mL 0     simethicone (MYLICON) 40 MG/0.6ML suspension 0.6 mLs (40 mg) by Per Feeding Tube route 4 times daily (Patient not taking: Reported on 8/2/2017)          Allergies   Allergen Reactions     Amoxicillin Diarrhea     Ativan [Lorazepam] Other (See Comments)     Hallucinations     Hydromorphone Itching     4/12/17 - patient open to using this as she tolerated Hydromorphone PCA during hospitalization in January 2017.      Morphine Itching     Past Surgical History:   Procedure Laterality Date     APPENDECTOMY       BACK SURGERY  5/1/2015    lumbar fusion     BRONCHOSCOPY FLEXIBLE N/A 4/17/2017    Procedure: BRONCHOSCOPY FLEXIBLE;;  Surgeon: Jens Wise MD;  [facility]     C GASTROSTOMY/JEJUN TUBE      J tube for feedings     CLOSE SPIT FISTULA N/A 4/17/2017    Procedure: CLOSE SPIT FISTULA;;  Surgeon: Jens Wise MD;  [facility]     COMBINED ESOPHAGOSCOPY, GASTROSCOPY, DUODENOSCOPY (EGD), REMOVE ESOPHAGEAL STENT N/A 8/26/2016    Procedure: COMBINED ESOPHAGOSCOPY, GASTROSCOPY, DUODENOSCOPY (EGD), REMOVE ESOPHAGEAL STENT;  Surgeon: Jens Wise MD;  [facility]     CREATE SPIT FISTULA N/A 1/9/2017    Procedure: CREATE SPIT FISTULA;  Surgeon: Jens Wise MD;  [facility]     ESOPHAGECTOMY N/A 1/9/2017    Procedure: ESOPHAGECTOMY;  Surgeon: Jens Wise MD;  [facility]     ESOPHAGOSCOPY, GASTROSCOPY, DUODENOSCOPY (EGD), COMBINED N/A 4/18/2016    Procedure: COMBINED ESOPHAGOSCOPY, GASTROSCOPY, DUODENOSCOPY (EGD);  Surgeon: Alexis Barraza MD;  [facility]     ESOPHAGOSCOPY, GASTROSCOPY, DUODENOSCOPY (EGD), COMBINED N/A 4/25/2016    Procedure: COMBINED ESOPHAGOSCOPY, GASTROSCOPY, DUODENOSCOPY (EGD);  Surgeon: Alexis Barraza MD;  [facility]     ESOPHAGOSCOPY, GASTROSCOPY, DUODENOSCOPY (EGD), COMBINED N/A 5/4/2016    Procedure: COMBINED ESOPHAGOSCOPY, GASTROSCOPY, DUODENOSCOPY (EGD);  Surgeon: Alexis Barraza MD;  [facility]     ESOPHAGOSCOPY, GASTROSCOPY, DUODENOSCOPY (EGD), COMBINED N/A 5/18/2016    Procedure: COMBINED ESOPHAGOSCOPY, GASTROSCOPY, DUODENOSCOPY (EGD);  Surgeon: Alexis Barraza MD;  [facility]     ESOPHAGOSCOPY, GASTROSCOPY, DUODENOSCOPY (EGD), COMBINED N/A 6/22/2016    Procedure: COMBINED ESOPHAGOSCOPY, GASTROSCOPY, DUODENOSCOPY (EGD);  Surgeon: Alexis Barraza MD;  [facility]     ESOPHAGOSCOPY, GASTROSCOPY, DUODENOSCOPY (EGD), COMBINED N/A 7/12/2016    Procedure: COMBINED ESOPHAGOSCOPY, GASTROSCOPY, DUODENOSCOPY (EGD);  Surgeon: Jens Wise MD;  [facility]     ESOPHAGOSCOPY, GASTROSCOPY, DUODENOSCOPY (EGD), COMBINED N/A 4/21/2017    Procedure: COMBINED ESOPHAGOSCOPY, GASTROSCOPY, DUODENOSCOPY (EGD);  Esophagogastroduodenoscopy, Pharyngostomy Tube Placement ;  Surgeon: Jens Wise MD;  [facility]     ESOPHAGOSCOPY, GASTROSCOPY, DUODENOSCOPY (EGD), COMBINED N/A 5/19/2017    Procedure: COMBINED ESOPHAGOSCOPY, GASTROSCOPY, DUODENOSCOPY (EGD);  Upper Endoscopy, Irrigation and Debridement of Chest Wound ;   Surgeon: Nalini Barreto MD;  [facility]     ESOPHAGOSCOPY, GASTROSCOPY, DUODENOSCOPY (EGD), COMBINED N/A 5/23/2017    Procedure: COMBINED ESOPHAGOSCOPY, GASTROSCOPY, DUODENOSCOPY (EGD);;  Surgeon: Nalini Barreto MD;  [facility]     ESOPHAGOSCOPY, GASTROSCOPY, DUODENOSCOPY (EGD), COMBINED N/A 6/27/2017    Procedure: COMBINED ESOPHAGOSCOPY, GASTROSCOPY, DUODENOSCOPY (EGD);  Esophagogastroduodenoscopy With Removal And Replacement Of Esophageal Stent exchange. Exchange of pharyngtomy tube. Chest wound dressing change;  Surgeon: Nalini Barreto MD;  [facility]     ESOPHAGOSCOPY, GASTROSCOPY, DUODENOSCOPY (EGD), DILATATION, COMBINED N/A 6/29/2016    Procedure: COMBINED ESOPHAGOSCOPY, GASTROSCOPY, DUODENOSCOPY (EGD), DILATATION;  Surgeon: Jens Wise MD;  [facility]     EXPLORE NECK N/A 5/19/2017    Procedure: EXPLORE NECK;;  Surgeon: Nalini Barreto MD;  [facility]     HC UGI ENDOSCOPY W TRANSENDOSCOPIC STENT PLACEMENT N/A 7/12/2016    Procedure: COMBINED ESOPHAGOSCOPY, GASTROSCOPY, DUODENOSCOPY (EGD), PLACE TRANSENDOSCOPIC ESOPHAGEAL STENT;  Surgeon: Jens Wise MD;  [facility]     HC UGI ENDOSCOPY W TRANSENDOSCOPIC STENT PLACEMENT N/A 7/22/2016    Procedure: COMBINED ESOPHAGOSCOPY, GASTROSCOPY, DUODENOSCOPY (EGD), PLACE TRANSENDOSCOPIC ESOPHAGEAL STENT;  Surgeon: Jens Wise MD;  [facility]     INCISION AND DRAINAGE CHEST WASHOUT, COMBINED N/A 5/27/2017    Procedure: COMBINED INCISION AND DRAINAGE CHEST WASHOUT;  Chest washout;  Surgeon: Nalini Barreto MD;  [facility]     INCISION AND DRAINAGE CHEST WASHOUT, COMBINED N/A 5/28/2017    Procedure: COMBINED INCISION AND DRAINAGE CHEST WASHOUT;  Chest washout;  Surgeon: Nalini Barreto MD;  [facility]     INCISION AND DRAINAGE CHEST WASHOUT, COMBINED N/A 6/9/2017    Procedure: COMBINED INCISION AND DRAINAGE CHEST WASHOUT;  Chest washout and dressing change, Esophaogastroduodenoscopy;   Surgeon: Jens Wise MD;  [facility]     INCISION AND DRAINAGE CHEST WASHOUT, COMBINED N/A 7/17/2017    Procedure: COMBINED INCISION AND DRAINAGE CHEST WASHOUT;  Incision and Drainage of right Chest Wound, Esophagogastroduodenoscopy with stent exchange. pharangostomy tube revision;  Surgeon: Jens Wise MD;  [facility]     IRRIGATION AND DEBRIDEMENT CHEST WASHOUT, COMBINED N/A 6/29/2016    Procedure: COMBINED IRRIGATION AND DEBRIDEMENT CHEST WASHOUT;  Surgeon: Jens Wise MD;  [facility]     IRRIGATION AND DEBRIDEMENT CHEST WASHOUT, COMBINED N/A 5/23/2017    Procedure: COMBINED IRRIGATION AND DEBRIDEMENT CHEST WASHOUT;  COMBINED IRRIGATION AND DEBRIDEMENT CHEST WASHOUT,COMBINED ESOPHAGOSCOPY, GASTROSCOPY, DUODENOSCOPY (EGD) ;  Surgeon: Nalini Barreto MD;  [facility]     IRRIGATION AND DEBRIDEMENT CHEST WASHOUT, COMBINED N/A 5/24/2017    Procedure: COMBINED IRRIGATION AND DEBRIDEMENT CHEST WASHOUT;  Chest wound Washout and Dressing Change;  Surgeon: Nalini Barreto MD;  [facility]     IRRIGATION AND DEBRIDEMENT CHEST WASHOUT, COMBINED N/A 5/25/2017    Procedure: COMBINED IRRIGATION AND DEBRIDEMENT CHEST WASHOUT;  Irrigation and Debridement Chest Washout and dressing change;  Surgeon: Nalini Barreto MD;  [facility]     IRRIGATION AND DEBRIDEMENT CHEST WASHOUT, COMBINED N/A 5/26/2017    Procedure: COMBINED IRRIGATION AND DEBRIDEMENT CHEST WASHOUT;  Irrigation and Debridement Chest Washout with  dressing change;  Surgeon: Nalini Barreto MD;  [facility]     IRRIGATION AND DEBRIDEMENT CHEST WASHOUT, COMBINED N/A 5/30/2017    Procedure: COMBINED IRRIGATION AND DEBRIDEMENT CHEST WASHOUT;  Irrigation and Debridement Chest Washout , PICC line dressing change;  Surgeon: Nalini Barreto MD;  [facility]     IRRIGATION AND DEBRIDEMENT CHEST WASHOUT, COMBINED N/A 5/31/2017    Procedure: COMBINED IRRIGATION AND DEBRIDEMENT CHEST WASHOUT;   Irrigation and Debridement Chest Washout ;  Surgeon: Nalini Barreto MD;  [facility]     IRRIGATION AND DEBRIDEMENT CHEST WASHOUT, COMBINED N/A 6/1/2017    Procedure: COMBINED IRRIGATION AND DEBRIDEMENT CHEST WASHOUT;  Chest Wound Irrigation And Debridement with dressing change ;  Surgeon: Nalini Barreto MD;  [facility]     IRRIGATION AND DEBRIDEMENT CHEST WASHOUT, COMBINED N/A 6/4/2017    Procedure: COMBINED IRRIGATION AND DEBRIDEMENT CHEST WASHOUT;  COMBINED IRRIGATION AND DEBRIDEMENT CHEST WASHOUT, Esophagoscopy, Gastroscopy, Duodenoscopy (EGD) place transendoscopic esophageal stent,   Pharyngostomy and chest wall tube exchange  C-Arm;  Surgeon: Jens Wise MD;  [facility]     IRRIGATION AND DEBRIDEMENT CHEST WASHOUT, COMBINED N/A 6/2/2017    Procedure: COMBINED IRRIGATION AND DEBRIDEMENT CHEST WASHOUT;  Chest Wound Irrigation and Debridement, Dressing Change;  Surgeon: Nalini Barreto MD;  [facility]     LAPAROSCOPIC ASSISTED INSERTION TUBE JEJUNOSTOMY N/A 4/17/2017    Procedure: LAPAROSCOPIC ASSISTED INSERTION TUBE JEJUNOSTOMY;;  Surgeon: Jens Wise MD;  [facility]     LAPAROSCOPY DIAGNOSTIC (GENERAL) N/A 1/9/2017    Procedure: LAPAROSCOPY DIAGNOSTIC (GENERAL);  Surgeon: Jens Wise MD;  [facility]     LAPAROSCOPY DIAGNOSTIC (GENERAL) N/A 4/17/2017    Procedure: LAPAROSCOPY DIAGNOSTIC (GENERAL);  Laparoscopic , Neck Dissection, Spit Fistula Takedown, Laparoscopic Jejunostomy Tube and Pharyngostomy Tube, Gastric Pull up, Upper Endoscopy(EGD)  , Flexible Bronchoscopy ,Sternotomy ;  Surgeon: Jens Wise MD;  [facility]     LUMPECTOMY BREAST Right 2007     NERVE BLOCK PERIPHERAL N/A 8/30/2016    Procedure: NERVE BLOCK PERIPHERAL;  Surgeon: GENERIC ANESTHESIA PROVIDER;  [facility]     NISSEN FUNDOPLICATION  1/2016     PHARYNGOSTOMY N/A 4/18/2016    Procedure: PHARYNGOSTOMY;  Surgeon: Alexis Barraza MD;   "[facility]     PHARYNGOSTOMY N/A 4/25/2016    Procedure: PHARYNGOSTOMY;  Surgeon: Alexis Barraza MD;  [facility]     PHARYNGOSTOMY N/A 5/4/2016    Procedure: PHARYNGOSTOMY;  Surgeon: Alexis Barraza MD;  [facility]     PHARYNGOSTOMY N/A 6/22/2016    Procedure: PHARYNGOSTOMY;  Surgeon: Alexis Barraza MD;  [facility]     PHARYNGOSTOMY N/A 6/29/2016    Procedure: PHARYNGOSTOMY;  Surgeon: Jens Wise MD;  [facility]     PHARYNGOSTOMY N/A 4/21/2017    Procedure: PHARYNGOSTOMY;;  Surgeon: Jens Wise MD;  [facility]     PHARYNGOSTOMY Left 5/31/2017    Procedure: PHARYNGOSTOMY;;  Surgeon: Nalini Barreto MD;  [facility]     PICC INSERTION Left 8/25/2016    5fr DL BioFlo PICC, 42cm (3cm external) in the L medial brachial vein w/ tip in the SVC RA junction.     PICC INSERTION - Rewire Right 05/31/2017    5fr DL BioFlo PICC, 40cm (6cm external) in the R medial brachial vein w/ tip in the SVC RA junction.     THORACOTOMY Right 1998    lung infection - \"hard crust formed on lung\"     THORACOTOMY Left 1/9/2017    Procedure: THORACOTOMY;  Surgeon: Jens Wise MD;  [facility]     WRIST SURGERY       Family History   Problem Relation Age of Onset     Alzheimer Disease Mother      CEREBROVASCULAR DISEASE Father      cerebral hemorrhage     Ovarian Cancer Sister      Breast Cancer Daughter      Social History     Social History     Marital status:      Spouse name: neeru     Number of children: 2     Years of education: N/A     Occupational History     retired       Social History Main Topics     Smoking status: Former Smoker     Packs/day: 1.00     Years: 15.00     Types: Cigarettes     Quit date: 1/1/1998     Smokeless tobacco: Not on file     Alcohol use No     Drug use: No     Sexual activity: Not on file     Other Topics Concern     Not on file     Social History Narrative    Retired " "realtor.  Lives with  Richard. 2 children alive and well.     ROS: Unremarkable    Hemodynamically stable  Alert, awake, in no distress  Left neck pharyngostomy healing well  Rt chest wound granulating  Abdomen soft, with J tube in place    Results for orders placed or performed during the hospital encounter of 07/17/17   XR Surgery MANPREET Fluoro L/T 5 Min w Stills    Narrative    This exam was marked as non-reportable because it will not be read by a   radiologist or a [facility] non-radiologist provider.             Hemoglobin   Result Value Ref Range    Hemoglobin 10.5 (L) 11.7 - 15.7 g/dL   Surgical pathology exam   Result Value Ref Range    Copath Report       Patient Name: FAVIAN MALONE  MR#: [id number]  Specimen #: [id number]  Collected: 7/17/2017  Received: 7/17/2017  Reported: 7/18/2017 13:33  Ordering Phy(s): NIRALI LOPEZ    For improved result formatting, select 'View Enhanced Report Format'  under Linked Documents section.    SPECIMEN(S):  Esophogeal stent    FINAL DIAGNOSIS:  ESOPHOGEAL STENT, REMOVAL:  - Medical device consistent with stent, see gross description    I have personally reviewed all specimens and or slides, including the  listed special stains, and used them with my medical judgement to  determine the final diagnosis.    Electronically signed out by:    Jesse Cuello M.D    CLINICAL HISTORY:  Esophageal stent revision.    GROSS:  The specimen is received fresh with proper patient identification,  labeled \"esophageal stent\".  The specimen consists of a 12.5 cm in  length x 1.9 cm in diameter metal and plastic medical device consistent  with stent.  No soft tissue is attached.  No gross abnormalities are   noted.  The specimen is for gross evaluation only. (Dictated by: Monika Alvarez [facility] 7/17/2017 04:37 PM)    CPT Codes:  A: 52327-NT    TESTING LAB LOCATION:  [facility], [address]   " [id number]  [phone]    COLLECTION SITE:  Client: [facility]  [facility]: [facility] ([facility])     ABO/Rh type and screen   Result Value Ref Range    ABO A     RH(D)  Neg     Antibody Screen Neg     Test Valid Only At       [facility]    Specimen Expires 07/20/2017        IMPRESSION:  72F S/P esophagectomy with complex history and multiple interventions for esophageal leak    PLAN:  EGD, stent revision on 8/4/17    STAFF ADDENDUM:  I saw and evaluated Ms. Blanco and agree with the resident s findings and plan of care as documented in the resident s note and edited by me, as applicable.      In summary, Ms. Blanco has made impressive progress, and we plan to revise her stent to reassess the leak.  I spent a total of 20 minutes with Ms. Blanco, 15 of which were spent in counseling and coordination of care. The patient had all questions answered and was in agreement with the plan.  Jens Wise MD

## 2017-08-02 NOTE — LETTER
8/2/2017      RE: Minerva Blanco  [address]   [facility] [address]             Pre-Operative History and Physical    Minerva Blanco 72 year old  1945                                                                        [id number]                                                                                         Andrea Nino                                                                                             HPI:  Minerva is a 72 year old female with PMH of atrial fibrillation, breast cancer s/p lumpectomy 2007, fibromyalgia, GERD, IBS, meniere's disease, MS and symptomatic hiatal hernia. S/p Toupet fundoplication 1/2016 c/b esophageal perforation with mediastinal phlegmon s/p initial proximal gastrectomy, distal esophagectomy, spit fistula creation, left thoracotomy with mediastinal phlegmon excision on 1/9/2017. Since that time, she has undergone mulptiple procedures for management including esophageal dilations, stent placement/removal, washouts, lysis of adhesions, jejunostomy feeding tube placement, retrosternal gastric pull-through, resection of left half of the manubrium, spit fistula takedown, right tube thoracostomy and pharyngostomy tube placement. She most recently was admitted 5/18 and diagnosed with anastomic leak with mediastical abscess. S/p mediastinal wound debridement with chest tube placement (5/19) with subsequent pharyngostomy tube 5/31, stent placement 6/4 with exchange 6/27 and multiple washouts (6/2, 6/4, 6/9, 6/14, 6/27). Hospital course complicated by diarrhea (r/t TFs), surgical pain (seen by palliative). She has undergone multiple EGD and stent revisions.    Patient Active Problem List    Diagnosis Date Noted     Esophageal anastomotic leak 05/19/2017     Priority: Medium     Neck abscess 05/18/2017     Priority: Medium     Pneumonia 05/03/2017     Priority: Medium     Advance Care Planning 05/03/2017     Priority: Medium     Advance Care  Planning 5/3/2017: Receipt of ACP document:  Received: Health Care Directive which was witnessed or notarized on 4/28/2015.  Document previously scanned on 2/1/2017.  Validation form completed and scanned.  Code Status reflects choices in most recent ACP document. Confirmed/documented designated decision maker(s).  Added by Caterina Bingham MSW Advance Care Planning Liaison       Hx of esophagectomy 04/17/2017     Priority: Medium     History of esophagectomy 04/17/2017     Priority: Medium     Long-term (current) use of anticoagulants [Z79.01] 02/22/2017     Priority: Medium     Atrial fibrillation (H) [I48.91] 02/22/2017     Priority: Medium     Paroxysmal atrial fibrillation (H) 02/21/2017     Priority: Medium     Gastroesophageal reflux disease, esophagitis presence not specified 10/21/2016     Priority: Medium     Mediastinitis 08/28/2016     Priority: Medium     Esophageal stricture 08/25/2016     Priority: Medium     Esophageal perforation 04/18/2016     Priority: Medium     Abscess 04/16/2016     Priority: Medium     Past Medical History:   Diagnosis Date     Atrial fibrillation (H)      Breast cancer (H) 2007    right side - lumpectomy, chemo, and local radiation     Chronic infection     infection/wound for two years after esoph surgery     Esophageal perforation      Fibromyalgia      GERD (gastroesophageal reflux disease)      Hiatal hernia      History of blood transfusion      IBS (irritable bowel syndrome)      Meniere's disease     deaf left ear     MS (multiple sclerosis) (H)      Noninfectious ileitis      Other chronic pain      Current Outpatient Prescriptions   Medication Sig Dispense Refill     oxyCODONE (ROXICODONE) 5 MG IR tablet Take 1 tablet (5 mg) by mouth every 6 hours as needed for pain or other (Moderate to Severe) 120 tablet 0     diphenoxylate-atropine (LOMOTIL) 2.5-0.025 MG per tablet Take 1 tablet by mouth 4 times daily as needed for diarrhea 40 tablet 1     oxyCODONE (ROXICODONE) 10  MG IR tablet Take 1 tablet (10 mg) by mouth every 4 hours as needed for moderate to severe pain 42 tablet 0     pantoprazole (PROTONIX) 40 MG EC tablet Take by mouth 30-60 minutes before a meal. 30 tablet 0     diphenoxylate-atropine (LOMOTIL) 2.5-0.025 MG/5ML liquid Take 5 mLs by mouth 4 times daily as needed for diarrhea 300 mL 0     ferrous sulfate 300 (60 FE) MG/5ML syrup 5 mLs (300 mg) by Per J Tube route daily 150 mL 0     acetaminophen (TYLENOL) 32 mg/mL solution 30.45 mLs (975 mg) by Per Feeding Tube route 3 times daily 300 mL      gabapentin (NEURONTIN) 250 MG/5ML solution Take 6 mLs (300 mg) by mouth every 8 hours 550 mL 0     fiber modular (NUTRISOURCE FIBER) packet 1 packet by Per Feeding Tube route 3 times daily (with meals) 60 packet 0     traZODone (DESYREL) 100 MG tablet 0.5 tablets (50 mg) by Per G Tube route At Bedtime (Patient taking differently: 100 mg by Per G Tube route At Bedtime ) 30 tablet 0     order for DME Equipment being ordered:   [facility] Suction Machine-Intermittent  Suction Canisters(2)  Suction Canister Holders(2)  Suction Connect Tube(2)  5 in 1 Connector(2)  Bacteria Filter(2)  Yaunkauer Suction(2)    Treatment Diagnosis: Esophogeal Perforation, s/p esophagectomy 1 Device 0     order for DME Equipment being ordered:   Suction Pump  Suction Canister(2)  Suction Tubing(2)  Bacteria Filter(2)  Yaunkauer(4)  Red Rubber Catheter(2)    Treatment Diagnosis: Esophogeal Perforation, s/p esophagectomy 1 Device 0     DiphenhydrAMINE HCl (BENADRYL PO) Take 25 mg by mouth nightly as needed       cholestyramine (QUESTRAN) 4 G Packet Take 1 packet by mouth daily       multivitamins with minerals (CERTAVITE/CEROVITE) LIQD liquid Take 15 mLs by mouth daily       loperamide (IMODIUM) 1 MG/5ML liquid Take 20 mLs (4 mg) by mouth 4 times daily as needed for diarrhea (Patient taking differently: Take 4 mg by mouth 2 times daily ) 200 mL      Lactobacillus Rhamnosus, GG, (CULTURELLE PO) Take 1 tablet by  mouth 2 times daily        clotrimazole (LOTRIMIN) 1 % cream Apply topically 2 times daily (Patient not taking: Reported on 8/2/2017) 12 g 1     sodium chloride, PF, (NORMAL SALINE FLUSH) 0.9% PF flush 30 mLs by Intracatheter route every 8 hours (Patient not taking: Reported on 8/2/2017) 1500 mL 0     chlorhexidine (PERIDEX) 0.12 % solution Swish and spit 15 mLs in mouth 2 times daily       metoprolol (LOPRESSOR) 10 mg/mL SUSP 1.25 mLs (12.5 mg) by Per J Tube route 2 times daily       opium tincture 10 MG/ML (1%) liquid Take 0.6 mLs (6 mg) by mouth every 6 hours as needed for diarrhea or moderate pain (Patient not taking: Reported on 8/2/2017) 118 mL 0     fentaNYL (DURAGESIC) 75 mcg/hr 72 hr patch Place 1 patch onto the skin every 72 hours (Patient not taking: Reported on 8/2/2017) 5 patch 0     chlorhexidine (HIBICLENS) 4 % liquid Apply topically 2 times daily (Patient not taking: Reported on 8/2/2017) 200 mL 0     simethicone (MYLICON) 40 MG/0.6ML suspension 0.6 mLs (40 mg) by Per Feeding Tube route 4 times daily (Patient not taking: Reported on 8/2/2017)          Allergies   Allergen Reactions     Amoxicillin Diarrhea     Ativan [Lorazepam] Other (See Comments)     Hallucinations     Hydromorphone Itching     4/12/17 - patient open to using this as she tolerated Hydromorphone PCA during hospitalization in January 2017.      Morphine Itching     Past Surgical History:   Procedure Laterality Date     APPENDECTOMY       BACK SURGERY  5/1/2015    lumbar fusion     BRONCHOSCOPY FLEXIBLE N/A 4/17/2017    Procedure: BRONCHOSCOPY FLEXIBLE;;  Surgeon: Jens Wise MD;  [facility]     C GASTROSTOMY/JEJUN TUBE      J tube for feedings     CLOSE SPIT FISTULA N/A 4/17/2017    Procedure: CLOSE SPIT FISTULA;;  Surgeon: Jens Wise MD;  [facility]     COMBINED ESOPHAGOSCOPY, GASTROSCOPY, DUODENOSCOPY (EGD), REMOVE ESOPHAGEAL STENT N/A 8/26/2016    Procedure: COMBINED ESOPHAGOSCOPY,  GASTROSCOPY, DUODENOSCOPY (EGD), REMOVE ESOPHAGEAL STENT;  Surgeon: Jens Wise MD;  [facility]     CREATE SPIT FISTULA N/A 1/9/2017    Procedure: CREATE SPIT FISTULA;  Surgeon: Jens Wise MD;  [facility]     ESOPHAGECTOMY N/A 1/9/2017    Procedure: ESOPHAGECTOMY;  Surgeon: Jens Wise MD;  [facility]     ESOPHAGOSCOPY, GASTROSCOPY, DUODENOSCOPY (EGD), COMBINED N/A 4/18/2016    Procedure: COMBINED ESOPHAGOSCOPY, GASTROSCOPY, DUODENOSCOPY (EGD);  Surgeon: Alexis Barraza MD;  [facility]     ESOPHAGOSCOPY, GASTROSCOPY, DUODENOSCOPY (EGD), COMBINED N/A 4/25/2016    Procedure: COMBINED ESOPHAGOSCOPY, GASTROSCOPY, DUODENOSCOPY (EGD);  Surgeon: Alexis Barraza MD;  [facility]     ESOPHAGOSCOPY, GASTROSCOPY, DUODENOSCOPY (EGD), COMBINED N/A 5/4/2016    Procedure: COMBINED ESOPHAGOSCOPY, GASTROSCOPY, DUODENOSCOPY (EGD);  Surgeon: Alexis Barraza MD;  [facility]     ESOPHAGOSCOPY, GASTROSCOPY, DUODENOSCOPY (EGD), COMBINED N/A 5/18/2016    Procedure: COMBINED ESOPHAGOSCOPY, GASTROSCOPY, DUODENOSCOPY (EGD);  Surgeon: Alexis Barraza MD;  [facility]     ESOPHAGOSCOPY, GASTROSCOPY, DUODENOSCOPY (EGD), COMBINED N/A 6/22/2016    Procedure: COMBINED ESOPHAGOSCOPY, GASTROSCOPY, DUODENOSCOPY (EGD);  Surgeon: Alexis Barraza MD;  [facility]     ESOPHAGOSCOPY, GASTROSCOPY, DUODENOSCOPY (EGD), COMBINED N/A 7/12/2016    Procedure: COMBINED ESOPHAGOSCOPY, GASTROSCOPY, DUODENOSCOPY (EGD);  Surgeon: Jens Wise MD;  [facility]     ESOPHAGOSCOPY, GASTROSCOPY, DUODENOSCOPY (EGD), COMBINED N/A 4/21/2017    Procedure: COMBINED ESOPHAGOSCOPY, GASTROSCOPY, DUODENOSCOPY (EGD);  Esophagogastroduodenoscopy, Pharyngostomy Tube Placement ;  Surgeon: Jens Wise MD;  [facility]     ESOPHAGOSCOPY, GASTROSCOPY, DUODENOSCOPY (EGD), COMBINED N/A 5/19/2017    Procedure: COMBINED  ESOPHAGOSCOPY, GASTROSCOPY, DUODENOSCOPY (EGD);  Upper Endoscopy, Irrigation and Debridement of Chest Wound ;  Surgeon: Nalini Barreto MD;  [facility]     ESOPHAGOSCOPY, GASTROSCOPY, DUODENOSCOPY (EGD), COMBINED N/A 5/23/2017    Procedure: COMBINED ESOPHAGOSCOPY, GASTROSCOPY, DUODENOSCOPY (EGD);;  Surgeon: Nalini Barreto MD;  [facility]     ESOPHAGOSCOPY, GASTROSCOPY, DUODENOSCOPY (EGD), COMBINED N/A 6/27/2017    Procedure: COMBINED ESOPHAGOSCOPY, GASTROSCOPY, DUODENOSCOPY (EGD);  Esophagogastroduodenoscopy With Removal And Replacement Of Esophageal Stent exchange. Exchange of pharyngtomy tube. Chest wound dressing change;  Surgeon: Nalini Barreto MD;  [facility]     ESOPHAGOSCOPY, GASTROSCOPY, DUODENOSCOPY (EGD), DILATATION, COMBINED N/A 6/29/2016    Procedure: COMBINED ESOPHAGOSCOPY, GASTROSCOPY, DUODENOSCOPY (EGD), DILATATION;  Surgeon: Jens Wise MD;  [facility]     EXPLORE NECK N/A 5/19/2017    Procedure: EXPLORE NECK;;  Surgeon: Nalini Barreto MD;  [facility]     HC UGI ENDOSCOPY W TRANSENDOSCOPIC STENT PLACEMENT N/A 7/12/2016    Procedure: COMBINED ESOPHAGOSCOPY, GASTROSCOPY, DUODENOSCOPY (EGD), PLACE TRANSENDOSCOPIC ESOPHAGEAL STENT;  Surgeon: Jens Wise MD;  [facility]     HC UGI ENDOSCOPY W TRANSENDOSCOPIC STENT PLACEMENT N/A 7/22/2016    Procedure: COMBINED ESOPHAGOSCOPY, GASTROSCOPY, DUODENOSCOPY (EGD), PLACE TRANSENDOSCOPIC ESOPHAGEAL STENT;  Surgeon: Jens Wise MD;  [facility]     INCISION AND DRAINAGE CHEST WASHOUT, COMBINED N/A 5/27/2017    Procedure: COMBINED INCISION AND DRAINAGE CHEST WASHOUT;  Chest washout;  Surgeon: Nalini Barreto MD;  [facility]     INCISION AND DRAINAGE CHEST WASHOUT, COMBINED N/A 5/28/2017    Procedure: COMBINED INCISION AND DRAINAGE CHEST WASHOUT;  Chest washout;  Surgeon: Nalini Barreto MD;  [facility]     INCISION AND DRAINAGE CHEST WASHOUT, COMBINED N/A 6/9/2017    Procedure:  COMBINED INCISION AND DRAINAGE CHEST WASHOUT;  Chest washout and dressing change, Esophaogastroduodenoscopy;  Surgeon: Jens Wise MD;  [facility]     INCISION AND DRAINAGE CHEST WASHOUT, COMBINED N/A 7/17/2017    Procedure: COMBINED INCISION AND DRAINAGE CHEST WASHOUT;  Incision and Drainage of right Chest Wound, Esophagogastroduodenoscopy with stent exchange. pharangostomy tube revision;  Surgeon: Jens Wise MD;  [facility]     IRRIGATION AND DEBRIDEMENT CHEST WASHOUT, COMBINED N/A 6/29/2016    Procedure: COMBINED IRRIGATION AND DEBRIDEMENT CHEST WASHOUT;  Surgeon: Jens Wise MD;  [facility]     IRRIGATION AND DEBRIDEMENT CHEST WASHOUT, COMBINED N/A 5/23/2017    Procedure: COMBINED IRRIGATION AND DEBRIDEMENT CHEST WASHOUT;  COMBINED IRRIGATION AND DEBRIDEMENT CHEST WASHOUT,COMBINED ESOPHAGOSCOPY, GASTROSCOPY, DUODENOSCOPY (EGD) ;  Surgeon: Nalini Barreto MD;  [facility]     IRRIGATION AND DEBRIDEMENT CHEST WASHOUT, COMBINED N/A 5/24/2017    Procedure: COMBINED IRRIGATION AND DEBRIDEMENT CHEST WASHOUT;  Chest wound Washout and Dressing Change;  Surgeon: Nalini Barreto MD;  [facility]     IRRIGATION AND DEBRIDEMENT CHEST WASHOUT, COMBINED N/A 5/25/2017    Procedure: COMBINED IRRIGATION AND DEBRIDEMENT CHEST WASHOUT;  Irrigation and Debridement Chest Washout and dressing change;  Surgeon: Nalini Barreto MD;  [facility]     IRRIGATION AND DEBRIDEMENT CHEST WASHOUT, COMBINED N/A 5/26/2017    Procedure: COMBINED IRRIGATION AND DEBRIDEMENT CHEST WASHOUT;  Irrigation and Debridement Chest Washout with  dressing change;  Surgeon: Nalini Barreto MD;  [facility]     IRRIGATION AND DEBRIDEMENT CHEST WASHOUT, COMBINED N/A 5/30/2017    Procedure: COMBINED IRRIGATION AND DEBRIDEMENT CHEST WASHOUT;  Irrigation and Debridement Chest Washout , PICC line dressing change;  Surgeon: Nalini Barreto MD;  [facility]     IRRIGATION AND DEBRIDEMENT  CHEST WASHOUT, COMBINED N/A 5/31/2017    Procedure: COMBINED IRRIGATION AND DEBRIDEMENT CHEST WASHOUT;  Irrigation and Debridement Chest Washout ;  Surgeon: Nalini Barreto MD;  [facility]     IRRIGATION AND DEBRIDEMENT CHEST WASHOUT, COMBINED N/A 6/1/2017    Procedure: COMBINED IRRIGATION AND DEBRIDEMENT CHEST WASHOUT;  Chest Wound Irrigation And Debridement with dressing change ;  Surgeon: Nalini Barreto MD;  [facility]     IRRIGATION AND DEBRIDEMENT CHEST WASHOUT, COMBINED N/A 6/4/2017    Procedure: COMBINED IRRIGATION AND DEBRIDEMENT CHEST WASHOUT;  COMBINED IRRIGATION AND DEBRIDEMENT CHEST WASHOUT, Esophagoscopy, Gastroscopy, Duodenoscopy (EGD) place transendoscopic esophageal stent,   Pharyngostomy and chest wall tube exchange  C-Arm;  Surgeon: Jens Wise MD;  [facility]     IRRIGATION AND DEBRIDEMENT CHEST WASHOUT, COMBINED N/A 6/2/2017    Procedure: COMBINED IRRIGATION AND DEBRIDEMENT CHEST WASHOUT;  Chest Wound Irrigation and Debridement, Dressing Change;  Surgeon: Nalini Barreto MD;  [facility]     LAPAROSCOPIC ASSISTED INSERTION TUBE JEJUNOSTOMY N/A 4/17/2017    Procedure: LAPAROSCOPIC ASSISTED INSERTION TUBE JEJUNOSTOMY;;  Surgeon: Jens Wise MD;  [facility]     LAPAROSCOPY DIAGNOSTIC (GENERAL) N/A 1/9/2017    Procedure: LAPAROSCOPY DIAGNOSTIC (GENERAL);  Surgeon: Jens Wise MD;  [facility]     LAPAROSCOPY DIAGNOSTIC (GENERAL) N/A 4/17/2017    Procedure: LAPAROSCOPY DIAGNOSTIC (GENERAL);  Laparoscopic , Neck Dissection, Spit Fistula Takedown, Laparoscopic Jejunostomy Tube and Pharyngostomy Tube, Gastric Pull up, Upper Endoscopy(EGD)  , Flexible Bronchoscopy ,Sternotomy ;  Surgeon: Jens Wise MD;  [facility]     LUMPECTOMY BREAST Right 2007     NERVE BLOCK PERIPHERAL N/A 8/30/2016    Procedure: NERVE BLOCK PERIPHERAL;  Surgeon: GENERIC ANESTHESIA PROVIDER;  [facility]     NISSEN FUNDOPLICATION  1/2016      "PHARYNGOSTOMY N/A 4/18/2016    Procedure: PHARYNGOSTOMY;  Surgeon: Alexis Barraza MD;  [facility]     PHARYNGOSTOMY N/A 4/25/2016    Procedure: PHARYNGOSTOMY;  Surgeon: Alexis Barraza MD;  [facility]     PHARYNGOSTOMY N/A 5/4/2016    Procedure: PHARYNGOSTOMY;  Surgeon: Alexis Barraza MD;  [facility]     PHARYNGOSTOMY N/A 6/22/2016    Procedure: PHARYNGOSTOMY;  Surgeon: Alexis Barraza MD;  [facility]     PHARYNGOSTOMY N/A 6/29/2016    Procedure: PHARYNGOSTOMY;  Surgeon: Jens Wise MD;  [facility]     PHARYNGOSTOMY N/A 4/21/2017    Procedure: PHARYNGOSTOMY;;  Surgeon: Jens Wise MD;  [facility]     PHARYNGOSTOMY Left 5/31/2017    Procedure: PHARYNGOSTOMY;;  Surgeon: Nalini Barreto MD;  [facility]     PICC INSERTION Left 8/25/2016    5fr DL BioFlo PICC, 42cm (3cm external) in the L medial brachial vein w/ tip in the SVC RA junction.     PICC INSERTION - Rewire Right 05/31/2017    5fr DL BioFlo PICC, 40cm (6cm external) in the R medial brachial vein w/ tip in the SVC RA junction.     THORACOTOMY Right 1998    lung infection - \"hard crust formed on lung\"     THORACOTOMY Left 1/9/2017    Procedure: THORACOTOMY;  Surgeon: Jens Wise MD;  [facility]     WRIST SURGERY       Family History   Problem Relation Age of Onset     Alzheimer Disease Mother      CEREBROVASCULAR DISEASE Father      cerebral hemorrhage     Ovarian Cancer Sister      Breast Cancer Daughter      Social History     Social History     Marital status:      Spouse name: neeru     Number of children: 2     Years of education: N/A     Occupational History     retired       Social History Main Topics     Smoking status: Former Smoker     Packs/day: 1.00     Years: 15.00     Types: Cigarettes     Quit date: 1/1/1998     Smokeless tobacco: Not on file     Alcohol use No     Drug use: No     Sexual " "activity: Not on file     Other Topics Concern     Not on file     Social History Narrative    Retired realtor.  Lives with  Richard. 2 children alive and well.     ROS: Unremarkable    Hemodynamically stable  Alert, awake, in no distress  Left neck pharyngostomy healing well  Rt chest wound granulating  Abdomen soft, with J tube in place    Results for orders placed or performed during the hospital encounter of 07/17/17   XR Surgery MANPREET Fluoro L/T 5 Min w Stills    Narrative    This exam was marked as non-reportable because it will not be read by a   radiologist or a [facility] non-radiologist provider.             Hemoglobin   Result Value Ref Range    Hemoglobin 10.5 (L) 11.7 - 15.7 g/dL   Surgical pathology exam   Result Value Ref Range    Copath Report       Patient Name: FAVIAN MALONE  MR#: [id number]  Specimen #: [id number]  Collected: 7/17/2017  Received: 7/17/2017  Reported: 7/18/2017 13:33  Ordering Phy(s): NIRALI LOPEZ    For improved result formatting, select 'View Enhanced Report Format'  under Linked Documents section.    SPECIMEN(S):  Esophogeal stent    FINAL DIAGNOSIS:  ESOPHOGEAL STENT, REMOVAL:  - Medical device consistent with stent, see gross description    I have personally reviewed all specimens and or slides, including the  listed special stains, and used them with my medical judgement to  determine the final diagnosis.    Electronically signed out by:    Jesse Cuello M.D    CLINICAL HISTORY:  Esophageal stent revision.    GROSS:  The specimen is received fresh with proper patient identification,  labeled \"esophageal stent\".  The specimen consists of a 12.5 cm in  length x 1.9 cm in diameter metal and plastic medical device consistent  with stent.  No soft tissue is attached.  No gross abnormalities are   noted.  The specimen is for gross evaluation only. (Dictated by: Monika Alvarez [facility] 7/17/2017 04:37 PM)    CPT Codes:  A: 23423-DU    TESTING LAB " LOCATION:  [facility], [facility] [address]  [address] [place] [address]   [id number]  [phone]    COLLECTION SITE:  Client: [facility]  [facility]: [facility] ([facility])     ABO/Rh type and screen   Result Value Ref Range    ABO A     RH(D)  Neg     Antibody Screen Neg     Test Valid Only At       [facility]    Specimen Expires 07/20/2017        IMPRESSION:  72F S/P esophagectomy with complex history and multiple interventions for esophageal leak    PLAN:  EGD, stent revision on 8/4/17    STAFF ADDENDUM:  I saw and evaluated Ms. Blanco and agree with the resident s findings and plan of care as documented in the resident s note and edited by me, as applicable.      In summary, Ms. Blanco has made impressive progress, and we plan to revise her stent to reassess the leak.  I spent a total of 20 minutes with Ms. Blanco, 15 of which were spent in counseling and coordination of care. The patient had all questions answered and was in agreement with the plan.  MD Jens Mccoy MD

## 2017-08-02 NOTE — NURSING NOTE
Oncology Rooming Note    August 2, 2017 1:42 PM   Minerva Blanco is a 72 year old female who presents for:    Chief Complaint   Patient presents with     Oncology Clinic Visit     Return visit related to Esophageal PERF     Initial Vitals: /73 (BP Location: Left arm, Patient Position: Sitting, Cuff Size: Adult Small)  Pulse 75  Temp 98.3  F (36.8  C) (Oral)  Resp 18  Ht 1.524 m (5')  Wt 39 kg (85 lb 15.7 oz)  SpO2 98%  BMI 16.79 kg/m2 Estimated body mass index is 16.79 kg/(m^2) as calculated from the following:    Height as of this encounter: 1.524 m (5').    Weight as of this encounter: 39 kg (85 lb 15.7 oz). Body surface area is 1.28 meters squared.  Moderate Pain (5) Comment: Data Unavailable   No LMP recorded. Patient is postmenopausal.  Allergies reviewed: Yes  Medications reviewed: Yes    Medications: MEDICATION REFILLS NEEDED TODAY. Provider was notified.  Pharmacy name entered into [employer]:    [id number] - [place], [place] - [address] [facility] [place], [place] - [address]    Clinical concerns: No new concerns .Unable to complete screening as Resident came in room. Provider was NOT notified.    10 minutes for nursing intake (face to face time)     Delphine Disla LPN

## 2017-08-02 NOTE — MR AVS SNAPSHOT
"              After Visit Summary   8/2/2017    Minerva Blanco    MRN: [id number]           Patient Information     Date Of Birth          1945        Visit Information        Provider Department      8/2/2017 1:45 PM Jens iWse MD [facility]        Today's Diagnoses     Esophageal anastomotic leak           Follow-ups after your visit        Your next 10 appointments already scheduled     Aug 28, 2017  3:50 PM CDT   (Arrive by 3:35 PM)   New Patient Visit with Richard Vigil MD   [facility] [[email or web address]] for Comprehensive Pain Management ([facility])    [address] [place] [id number] [phone]              Who to contact     If you have questions or need follow up information about today's clinic visit or your schedule please contact [facility] directly at [phone].  Normal or non-critical lab and imaging results will be communicated to you by MyChart, letter or phone within 4 business days after the clinic has received the results. If you do not hear from us within 7 days, please contact the clinic through MyChart or phone. If you have a critical or abnormal lab result, we will notify you by phone as soon as possible.  Submit refill requests through [employer] or call your pharmacy and they will forward the refill request to us. Please allow 3 business days for your refill to be completed.          Additional Information About Your Visit        MyChart Information     [employer] [facility] lets you send messages to your doctor, view your test results, renew your prescriptions, schedule appointments and more. To sign up, go to [email or web address] . Click on \"Log in\" on the left side of the screen, which will take you to the Welcome page. Then click on \"Sign up Now\" on the right side of the page.     You will be asked to enter the access code listed below, as well as some personal information. " Please follow the directions to create your username and password.     Your access code is: [email or web address]  Expires: 2017 12:26 PM     Your access code will  in 90 days. If you need help or a new code, please call your [place] clinic or [phone].        Care EveryWhere ID     This is your Care EveryWhere ID. This could be used by other organizations to access your [place] medical records  [id number]        Your Vitals Were     Pulse Temperature Respirations Height Pulse Oximetry BMI (Body Mass Index)    75 98.3  F (36.8  C) (Oral) 18 1.524 m (5') 98% 16.79 kg/m2       Blood Pressure from Last 3 Encounters:   17 130/73   17 123/73   17 119/70    Weight from Last 3 Encounters:   17 39.4 kg (86 lb 13.8 oz)   17 39 kg (85 lb 15.7 oz)   17 40.1 kg (88 lb 8 oz)              Today, you had the following     No orders found for display         Today's Medication Changes          These changes are accurate as of: 17 11:59 PM.  If you have any questions, ask your nurse or doctor.               These medicines have changed or have updated prescriptions.        Dose/Directions    * diphenoxylate-atropine 2.5-0.025 MG/5ML liquid   Commonly known as:  LOMOTIL   This may have changed:  Another medication with the same name was added. Make sure you understand how and when to take each.   Used for:  Bowel habit changes   Changed by:  Jens Wise MD        Dose:  5 mL   Take 5 mLs by mouth 4 times daily as needed for diarrhea   Quantity:  300 mL   Refills:  0       * diphenoxylate-atropine 2.5-0.025 MG per tablet   Commonly known as:  LOMOTIL   This may have changed:  You were already taking a medication with the same name, and this prescription was added. Make sure you understand how and when to take each.   Used for:  Esophageal anastomotic leak   Changed by:  Jens Wise MD        Dose:  1 tablet   Take 1 tablet by mouth 4 times daily as needed for  diarrhea   Quantity:  40 tablet   Refills:  1       loperamide 1 MG/5ML liquid   Commonly known as:  IMODIUM   This may have changed:  when to take this   Used for:  Bowel habit changes        Dose:  4 mg   Take 20 mLs (4 mg) by mouth 4 times daily as needed for diarrhea   Quantity:  200 mL   Refills:  0       traZODone 100 MG tablet   Commonly known as:  DESYREL   This may have changed:  how much to take   Used for:  Insomnia, unspecified type        Dose:  50 mg   0.5 tablets (50 mg) by Per G Tube route At Bedtime   Quantity:  30 tablet   Refills:  0       * Notice:  This list has 2 medication(s) that are the same as other medications prescribed for you. Read the directions carefully, and ask your doctor or other care provider to review them with you.         Where to get your medicines      Some of these will need a paper prescription and others can be bought over the counter.  Ask your nurse if you have questions.     Bring a paper prescription for each of these medications     diphenoxylate-atropine 2.5-0.025 MG per tablet                Primary Care Provider Office Phone # Fax #    Andrea Nino [phone]       [facility] [address] [address]        Equal Access to Services     [facility] JOSLYN AH: Hadii davion Meng, wawilfredoda nick, qaybta kaalmada monica, florentin saldivar. So [facility] [phone].    ATENCIÓN: Si habla español, tiene a jackson disposición servicios gratuitos de asistencia lingüística. Sreekanth al [phone].    We comply with applicable federal civil rights laws and [place] laws. We do not discriminate on the basis of race, color, national origin, age, disability sex, sexual orientation or gender identity.            Thank you!     Thank you for choosing [facility]  for your care. Our goal is always to provide you with excellent care. Hearing back from our patients is one way we can continue to  improve our services. Please take a few minutes to complete the written survey that you may receive in the mail after your visit with us. Thank you!             Your Updated Medication List - Protect others around you: Learn how to safely use, store and throw away your medicines at www.disposemymeds.org.          This list is accurate as of: 8/2/17 11:59 PM.  Always use your most recent med list.                   Brand Name Dispense Instructions for use Diagnosis    acetaminophen 32 mg/mL solution    TYLENOL    300 mL    30.45 mLs (975 mg) by Per Feeding Tube route 3 times daily    Esophageal perforation       BENADRYL PO      Take 25 mg by mouth nightly as needed        chlorhexidine 0.12 % solution    PERIDEX     Swish and spit 15 mLs in mouth 2 times daily    Esophageal perforation       chlorhexidine 4 % liquid    HIBICLENS    200 mL    Apply topically 2 times daily    History of esophagectomy       cholestyramine 4 G Packet    QUESTRAN     Take 1 packet by mouth daily        clotrimazole 1 % cream    LOTRIMIN    12 g    Apply topically 2 times daily    Wound abscess, subsequent encounter       CULTURELLE PO      Take 1 tablet by mouth 2 times daily        * diphenoxylate-atropine 2.5-0.025 MG/5ML liquid    LOMOTIL    300 mL    Take 5 mLs by mouth 4 times daily as needed for diarrhea    Bowel habit changes       * diphenoxylate-atropine 2.5-0.025 MG per tablet    LOMOTIL    40 tablet    Take 1 tablet by mouth 4 times daily as needed for diarrhea    Esophageal anastomotic leak       fentaNYL 75 mcg/hr 72 hr patch    DURAGESIC    5 patch    Place 1 patch onto the skin every 72 hours    Esophageal perforation, Acute post-operative pain       ferrous sulfate 300 (60 FE) MG/5ML syrup     150 mL    5 mLs (300 mg) by Per J Tube route daily    Anemia due to other cause       fiber modular packet     60 packet    1 packet by Per Feeding Tube route 3 times daily (with meals)    History of esophagectomy       gabapentin  250 MG/5ML solution    NEURONTIN    550 mL    Take 6 mLs (300 mg) by mouth every 8 hours    Acute post-operative pain       loperamide 1 MG/5ML liquid    IMODIUM    200 mL    Take 20 mLs (4 mg) by mouth 4 times daily as needed for diarrhea    Bowel habit changes       metoprolol 10 mg/mL Susp    LOPRESSOR     1.25 mLs (12.5 mg) by Per J Tube route 2 times daily    Atrial fibrillation, unspecified type (H)       multivitamins with minerals Liqd liquid      Take 15 mLs by mouth daily        opium tincture 10 MG/ML (1%) liquid     118 mL    Take 0.6 mLs (6 mg) by mouth every 6 hours as needed for diarrhea or moderate pain    Bowel habit changes       * order for DME     1 Device    Equipment being ordered:  [facility] Suction Machine-Intermittent Suction Canisters(2) Suction Canister Holders(2) Suction Connect Tube(2) 5 in 1 Connector(2) Bacteria Filter(2) Yaunkauer Suction(2)  Treatment Diagnosis: Esophogeal Perforation, s/p esophagectomy    Hx of esophagectomy       * order for DME     1 Device    Equipment being ordered:  Suction Pump Suction Canister(2) Suction Tubing(2) Bacteria Filter(2) Yaunkauer(4) Red Rubber Catheter(2)  Treatment Diagnosis: Esophogeal Perforation, s/p esophagectomy    Esophageal perforation       * oxyCODONE 10 MG IR tablet    ROXICODONE    42 tablet    Take 1 tablet (10 mg) by mouth every 4 hours as needed for moderate to severe pain    Acute post-operative pain       * oxyCODONE 5 MG IR tablet    ROXICODONE    120 tablet    Take 1 tablet (5 mg) by mouth every 6 hours as needed for pain or other (Moderate to Severe)    Esophageal anastomotic leak       pantoprazole 40 MG EC tablet    PROTONIX    30 tablet    Take by mouth 30-60 minutes before a meal.    Gastroesophageal reflux disease, esophagitis presence not specified       simethicone 40 MG/0.6ML suspension    MYLICON     0.6 mLs (40 mg) by Per Feeding Tube route 4 times daily    History of esophagectomy       sodium chloride (PF) 0.9% PF  flush     1500 mL    30 mLs by Intracatheter route every 8 hours    Gastroesophageal reflux disease, esophagitis presence not specified       traZODone 100 MG tablet    DESYREL    30 tablet    0.5 tablets (50 mg) by Per G Tube route At Bedtime    Insomnia, unspecified type       * Notice:  This list has 6 medication(s) that are the same as other medications prescribed for you. Read the directions carefully, and ask your doctor or other care provider to review them with you.

## 2017-08-04 ENCOUNTER — ANESTHESIA (OUTPATIENT)
Dept: SURGERY | Facility: CLINIC | Age: 72
End: 2017-08-04
Payer: MEDICARE

## 2017-08-04 ENCOUNTER — ANESTHESIA EVENT (OUTPATIENT)
Dept: SURGERY | Facility: CLINIC | Age: 72
End: 2017-08-04
Payer: MEDICARE

## 2017-08-04 ENCOUNTER — APPOINTMENT (OUTPATIENT)
Dept: GENERAL RADIOLOGY | Facility: CLINIC | Age: 72
End: 2017-08-04
Attending: STUDENT IN AN ORGANIZED HEALTH CARE EDUCATION/TRAINING PROGRAM
Payer: MEDICARE

## 2017-08-04 ENCOUNTER — HOSPITAL ENCOUNTER (OUTPATIENT)
Facility: CLINIC | Age: 72
Discharge: HOME | End: 2017-08-04
Attending: STUDENT IN AN ORGANIZED HEALTH CARE EDUCATION/TRAINING PROGRAM | Admitting: STUDENT IN AN ORGANIZED HEALTH CARE EDUCATION/TRAINING PROGRAM
Payer: MEDICARE

## 2017-08-04 VITALS
BODY MASS INDEX: 17.05 KG/M2 | HEART RATE: 55 BPM | HEIGHT: 60 IN | SYSTOLIC BLOOD PRESSURE: 130 MMHG | OXYGEN SATURATION: 98 % | TEMPERATURE: 98 F | DIASTOLIC BLOOD PRESSURE: 73 MMHG | RESPIRATION RATE: 16 BRPM | WEIGHT: 86.86 LBS

## 2017-08-04 DIAGNOSIS — K91.89 ESOPHAGEAL ANASTOMOTIC LEAK: ICD-10-CM

## 2017-08-04 DIAGNOSIS — K22.3 ESOPHAGEAL PERFORATION: ICD-10-CM

## 2017-08-04 LAB — RADIOLOGIST FLAGS: ABNORMAL

## 2017-08-04 PROCEDURE — 25000128 H RX IP 250 OP 636: Performed by: NURSE ANESTHETIST, CERTIFIED REGISTERED

## 2017-08-04 PROCEDURE — 25000128 H RX IP 250 OP 636: Performed by: ANESTHESIOLOGY

## 2017-08-04 PROCEDURE — C1874 STENT, COATED/COV W/DEL SYS: HCPCS | Performed by: STUDENT IN AN ORGANIZED HEALTH CARE EDUCATION/TRAINING PROGRAM

## 2017-08-04 PROCEDURE — 40000986 XR CHEST PORT 1 VW

## 2017-08-04 PROCEDURE — 27211024 ZZHC OR SUPPLY OTHER OPNP: Performed by: STUDENT IN AN ORGANIZED HEALTH CARE EDUCATION/TRAINING PROGRAM

## 2017-08-04 PROCEDURE — 37000008 ZZH ANESTHESIA TECHNICAL FEE, 1ST 30 MIN: Performed by: STUDENT IN AN ORGANIZED HEALTH CARE EDUCATION/TRAINING PROGRAM

## 2017-08-04 PROCEDURE — 37000009 ZZH ANESTHESIA TECHNICAL FEE, EACH ADDTL 15 MIN: Performed by: STUDENT IN AN ORGANIZED HEALTH CARE EDUCATION/TRAINING PROGRAM

## 2017-08-04 PROCEDURE — 25000125 ZZHC RX 250: Performed by: NURSE ANESTHETIST, CERTIFIED REGISTERED

## 2017-08-04 PROCEDURE — 71000027 ZZH RECOVERY PHASE 2 EACH 15 MINS: Performed by: STUDENT IN AN ORGANIZED HEALTH CARE EDUCATION/TRAINING PROGRAM

## 2017-08-04 PROCEDURE — 36000053 ZZH SURGERY LEVEL 2 EA 15 ADDTL MIN - UMMC: Performed by: STUDENT IN AN ORGANIZED HEALTH CARE EDUCATION/TRAINING PROGRAM

## 2017-08-04 PROCEDURE — 40000277 XR SURGERY CARM FLUORO LESS THAN 5 MIN W STILLS: Mod: TC

## 2017-08-04 PROCEDURE — C9399 UNCLASSIFIED DRUGS OR BIOLOG: HCPCS | Performed by: NURSE ANESTHETIST, CERTIFIED REGISTERED

## 2017-08-04 PROCEDURE — 36000055 ZZH SURGERY LEVEL 2 W FLUORO 1ST 30 MIN - UMMC: Performed by: STUDENT IN AN ORGANIZED HEALTH CARE EDUCATION/TRAINING PROGRAM

## 2017-08-04 PROCEDURE — 27210794 ZZH OR GENERAL SUPPLY STERILE: Performed by: STUDENT IN AN ORGANIZED HEALTH CARE EDUCATION/TRAINING PROGRAM

## 2017-08-04 PROCEDURE — 71000012 ZZH RECOVERY PHASE 1 LEVEL 1 FIRST HR: Performed by: STUDENT IN AN ORGANIZED HEALTH CARE EDUCATION/TRAINING PROGRAM

## 2017-08-04 PROCEDURE — 40000170 ZZH STATISTIC PRE-PROCEDURE ASSESSMENT II: Performed by: STUDENT IN AN ORGANIZED HEALTH CARE EDUCATION/TRAINING PROGRAM

## 2017-08-04 PROCEDURE — 25000566 ZZH SEVOFLURANE, EA 15 MIN: Performed by: STUDENT IN AN ORGANIZED HEALTH CARE EDUCATION/TRAINING PROGRAM

## 2017-08-04 PROCEDURE — 88300 SURGICAL PATH GROSS: CPT | Performed by: STUDENT IN AN ORGANIZED HEALTH CARE EDUCATION/TRAINING PROGRAM

## 2017-08-04 DEVICE — STENT ESOPHAGEAL ENDOMAXX 19X100MM MAXX-1910
Type: IMPLANTABLE DEVICE | Site: ESOPHAGUS | Status: NON-FUNCTIONAL
Removed: 2017-08-25

## 2017-08-04 RX ORDER — ONDANSETRON 2 MG/ML
INJECTION INTRAMUSCULAR; INTRAVENOUS PRN
Status: DISCONTINUED | OUTPATIENT
Start: 2017-08-04 | End: 2017-08-04

## 2017-08-04 RX ORDER — ONDANSETRON 2 MG/ML
4 INJECTION INTRAMUSCULAR; INTRAVENOUS EVERY 30 MIN PRN
Status: DISCONTINUED | OUTPATIENT
Start: 2017-08-04 | End: 2017-08-04 | Stop reason: HOSPADM

## 2017-08-04 RX ORDER — SODIUM CHLORIDE, SODIUM LACTATE, POTASSIUM CHLORIDE, CALCIUM CHLORIDE 600; 310; 30; 20 MG/100ML; MG/100ML; MG/100ML; MG/100ML
INJECTION, SOLUTION INTRAVENOUS CONTINUOUS
Status: DISCONTINUED | OUTPATIENT
Start: 2017-08-04 | End: 2017-08-04 | Stop reason: HOSPADM

## 2017-08-04 RX ORDER — ONDANSETRON 4 MG/1
4 TABLET, ORALLY DISINTEGRATING ORAL EVERY 30 MIN PRN
Status: DISCONTINUED | OUTPATIENT
Start: 2017-08-04 | End: 2017-08-04 | Stop reason: HOSPADM

## 2017-08-04 RX ORDER — LIDOCAINE 40 MG/G
CREAM TOPICAL
Status: DISCONTINUED | OUTPATIENT
Start: 2017-08-04 | End: 2017-08-04 | Stop reason: HOSPADM

## 2017-08-04 RX ORDER — FENTANYL CITRATE 50 UG/ML
25-50 INJECTION, SOLUTION INTRAMUSCULAR; INTRAVENOUS
Status: DISCONTINUED | OUTPATIENT
Start: 2017-08-04 | End: 2017-08-04 | Stop reason: HOSPADM

## 2017-08-04 RX ORDER — OXYCODONE HYDROCHLORIDE 5 MG/1
5 TABLET ORAL EVERY 6 HOURS PRN
Qty: 120 TABLET | Refills: 0 | Status: ON HOLD | OUTPATIENT
Start: 2017-08-04 | End: 2017-08-25

## 2017-08-04 RX ORDER — EPHEDRINE SULFATE 50 MG/ML
INJECTION, SOLUTION INTRAMUSCULAR; INTRAVENOUS; SUBCUTANEOUS PRN
Status: DISCONTINUED | OUTPATIENT
Start: 2017-08-04 | End: 2017-08-04

## 2017-08-04 RX ORDER — FENTANYL CITRATE 50 UG/ML
INJECTION, SOLUTION INTRAMUSCULAR; INTRAVENOUS PRN
Status: DISCONTINUED | OUTPATIENT
Start: 2017-08-04 | End: 2017-08-04

## 2017-08-04 RX ORDER — NALOXONE HYDROCHLORIDE 0.4 MG/ML
.1-.4 INJECTION, SOLUTION INTRAMUSCULAR; INTRAVENOUS; SUBCUTANEOUS
Status: DISCONTINUED | OUTPATIENT
Start: 2017-08-04 | End: 2017-08-04 | Stop reason: HOSPADM

## 2017-08-04 RX ORDER — PROPOFOL 10 MG/ML
INJECTION, EMULSION INTRAVENOUS PRN
Status: DISCONTINUED | OUTPATIENT
Start: 2017-08-04 | End: 2017-08-04

## 2017-08-04 RX ORDER — MEPERIDINE HYDROCHLORIDE 25 MG/ML
12.5 INJECTION INTRAMUSCULAR; INTRAVENOUS; SUBCUTANEOUS
Status: DISCONTINUED | OUTPATIENT
Start: 2017-08-04 | End: 2017-08-04 | Stop reason: HOSPADM

## 2017-08-04 RX ORDER — LIDOCAINE HYDROCHLORIDE 20 MG/ML
INJECTION, SOLUTION INFILTRATION; PERINEURAL PRN
Status: DISCONTINUED | OUTPATIENT
Start: 2017-08-04 | End: 2017-08-04

## 2017-08-04 RX ADMIN — PROPOFOL 20 MG: 10 INJECTION, EMULSION INTRAVENOUS at 08:11

## 2017-08-04 RX ADMIN — FENTANYL CITRATE 50 MCG: 50 INJECTION, SOLUTION INTRAMUSCULAR; INTRAVENOUS at 08:13

## 2017-08-04 RX ADMIN — SUGAMMADEX 100 MG: 100 INJECTION, SOLUTION INTRAVENOUS at 09:03

## 2017-08-04 RX ADMIN — SODIUM CHLORIDE, POTASSIUM CHLORIDE, SODIUM LACTATE AND CALCIUM CHLORIDE: 600; 310; 30; 20 INJECTION, SOLUTION INTRAVENOUS at 07:18

## 2017-08-04 RX ADMIN — MIDAZOLAM HYDROCHLORIDE 1 MG: 1 INJECTION, SOLUTION INTRAMUSCULAR; INTRAVENOUS at 07:35

## 2017-08-04 RX ADMIN — FENTANYL CITRATE 50 MCG: 50 INJECTION, SOLUTION INTRAMUSCULAR; INTRAVENOUS at 09:18

## 2017-08-04 RX ADMIN — FENTANYL CITRATE 50 MCG: 50 INJECTION, SOLUTION INTRAMUSCULAR; INTRAVENOUS at 09:11

## 2017-08-04 RX ADMIN — Medication 10 MG: at 07:56

## 2017-08-04 RX ADMIN — ONDANSETRON 4 MG: 2 INJECTION INTRAMUSCULAR; INTRAVENOUS at 09:03

## 2017-08-04 RX ADMIN — FENTANYL CITRATE 50 MCG: 50 INJECTION, SOLUTION INTRAMUSCULAR; INTRAVENOUS at 07:47

## 2017-08-04 RX ADMIN — PROPOFOL 130 MG: 10 INJECTION, EMULSION INTRAVENOUS at 07:48

## 2017-08-04 RX ADMIN — PROPOFOL 20 MG: 10 INJECTION, EMULSION INTRAVENOUS at 08:08

## 2017-08-04 RX ADMIN — LIDOCAINE HYDROCHLORIDE 40 MG: 20 INJECTION, SOLUTION INFILTRATION; PERINEURAL at 07:48

## 2017-08-04 RX ADMIN — FENTANYL CITRATE 50 MCG: 50 INJECTION INTRAMUSCULAR; INTRAVENOUS at 09:30

## 2017-08-04 RX ADMIN — Medication 100 MG: at 07:48

## 2017-08-04 RX ADMIN — ROCURONIUM BROMIDE 20 MG: 10 INJECTION INTRAVENOUS at 08:10

## 2017-08-04 ASSESSMENT — ENCOUNTER SYMPTOMS: DYSRHYTHMIAS: 1

## 2017-08-04 NOTE — DISCHARGE INSTRUCTIONS
[facility]  Same-Day Surgery   Adult Discharge Orders & Instructions     For 24 hours after surgery    1. Get plenty of rest.  A responsible adult must stay with you for at least 24 hours after you leave the hospital.   2. Do not drive or use heavy equipment.  If you have weakness or tingling, don't drive or use heavy equipment until this feeling goes away.  3. Do not drink alcohol.  4. Avoid strenuous or risky activities.  Ask for help when climbing stairs.   5. You may feel lightheaded.  IF so, sit for a few minutes before standing.  Have someone help you get up.   6. If you have nausea (feel sick to your stomach): Drink only clear liquids such as apple juice, ginger ale, broth or 7-Up.  Rest may also help.  Be sure to drink enough fluids.  Move to a regular diet as you feel able.  7. You may have a slight fever. Call the doctor if your fever is over 100 F (37.7 C) (taken under the tongue) or lasts longer than 24 hours.  8. You may have a dry mouth, a sore throat, muscle aches or trouble sleeping.  These should go away after 24 hours.  9. Do not make important or legal decisions.   Call your doctor for any of the followin.  Signs of infection (fever, growing tenderness at the surgery site, a large amount of drainage or bleeding, severe pain, foul-smelling drainage, redness, swelling).    2. It has been over 8 to 10 hours since surgery and you are still not able to urinate (pass water).    3.  Headache for over 24 hours.    To contact a doctor, call Dr. Nalini Barreto @ [phone] (clinic) or [phone] (office) or:        [phone] and ask for the resident on call for Thoracic Surgery (answered 24 hours a day)      Emergency Department:    [facility]: [phone]       (TTY for hearing impaired: [phone])           Tips for taking pain medications  To get the best pain relief possible , remember these points:      Take pain medications as directed,  before pain becomes severe      Pain medication can upset your stomach: taking it with food may help      Constipation is a common side effect of pain medication. Drink plenty of  Fluids      Eat foods high in fiber. Take a stool softener  if recommended by your doctor or  Pharmacist.        Do not drink alcohol, drive or operate machinery while taking pain medications.      Ask about other ways to control pain, such as with heat, ice or relaxation.

## 2017-08-04 NOTE — ANESTHESIA PREPROCEDURE EVALUATION
Anesthesia Evaluation     . Pt has had prior anesthetic. Type: General and MAC    No history of anesthetic complications          ROS/MED HX    ENT/Pulmonary:  - neg pulmonary ROS     Neurologic: Comment: Meniere's Disease     (+)Multiple Sclerosis     Cardiovascular:  - neg cardiovascular ROS   (+) ----. Taking blood thinners : . . . :. dysrhythmias a-fib, . Previous cardiac testing Echodate:results:Interpretation Summary  Technically difficult study.  Global and regional left ventricular function is hyperdynamic with an LVEF  >70%.  The right ventricular function is hyperdynamic.  Mild pulmonary hypertension is present.  The inferior vena cava is normal. The estimated mean right atrial pressure is  <3 mmHg.  No pericardial effusion is present.  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 complicated by esophageal leak and mediastinal abscess s/p serial washouts under MAC; hiatal hernia; IBS    (+) GERD hiatal hernia,       Renal/Genitourinary:  - ROS Renal section negative       Endo:  - neg endo ROS       Psychiatric:  - neg psychiatric ROS       Infectious Disease: Comment: See GI  - neg infectious disease ROS       Malignancy:   (+) Malignancy History of Breast          Other: Comment: Fibromyalgia                     Physical Exam  Normal systems: cardiovascular, pulmonary and dental    Airway   Mallampati: I  TM distance: >3 FB  Neck ROM: full    Dental     Cardiovascular   Rhythm and rate: regular and normal      Pulmonary    breath sounds clear to auscultation                        Anesthesia Plan      History & Physical Review  History and physical reviewed and following examination; no interval  change.    ASA Status:  3 .    NPO Status:  > 8 hours    Plan for General, ETT and RSI with Intravenous and Propofol induction. Maintenance will be Inhalation.    PONV prophylaxis:  Ondansetron (or other 5HT-3) and Dexamethasone or Solumedrol       Postoperative Care  Postoperative pain management:  IV analgesics.      Consents  Anesthetic plan, risks, benefits and alternatives discussed with:  Patient..                          .

## 2017-08-04 NOTE — ANESTHESIA POSTPROCEDURE EVALUATION
Patient: Minerva Blanco    Procedure(s):  Esophagogastroduodenoscopy, Stent Removal and Stent Replacement. Dressing Change  - Wound Class: II-Clean Contaminated    Diagnosis:Esophagectomy, Anastomotic Leak   Diagnosis Additional Information: No value filed.    Anesthesia Type:  General, ETT, RSI    Note:  Anesthesia Post Evaluation    Patient location during evaluation: PACU  Patient participation: Able to fully participate in evaluation  Level of consciousness: awake  Pain management: adequate  Airway patency: patent  Cardiovascular status: acceptable  Respiratory status: acceptable  Hydration status: acceptable  PONV: none     Anesthetic complications: None          Last vitals:  Vitals:    08/04/17 0959 08/04/17 1000 08/04/17 1020   BP: 129/57 114/77 130/73   Pulse: 55     Resp: 16 16 16   Temp: 36.5  C (97.7  F)  36.7  C (98  F)   SpO2: 100% 92% 98%         Electronically Signed By: Cirilo Alvarez MD  August 4, 2017  10:52 AM

## 2017-08-04 NOTE — IP AVS SNAPSHOT
MRN:[id number]                      After Visit Summary   8/4/2017    Minerva Blanco    MRN: [id number]           Thank you!     Thank you for choosing [facility] for your care. Our goal is always to provide you with excellent care. Hearing back from our patients is one way we can continue to improve our services. Please take a few minutes to complete the written survey that you may receive in the mail after you visit with us. Thank you!        Patient Information     Date Of Birth          1945        About your hospital stay     You were admitted on:  August 4, 2017 You last received care in the:  Post Anesthesia Care Unit [facility]    You were discharged on:  August 4, 2017        Reason for your hospital stay       Esophageal anastomotic leak, for stent placement                  Who to Call     For medical emergencies, please call 911.  For non-urgent questions about your medical care, please call your primary care provider or clinic, [phone]  For questions related to your surgery, please call your surgery clinic        Attending Provider     Provider Nalini Crespo MD Thoracic Diseases       Primary Care Provider Office Phone # Fax #    Andrea Nino [phone]      After Care Instructions     Discharge Instructions       DIET: Continue with sips and chips     Sleep with the head of your bed elevated to help prevent reflux.    If your travel plans upon discharge include prolonged periods of sitting (a lengthy car or plane ride), it is highly beneficial to get up and walk at least once per hour to help prevent swelling and blood clots.     Take incentive spirometer home for continued frequent use    Activity as tolerated, no strenous activity until seen in follow-up, no lifting greater than 10 pounds for the next 2-4 weeks.    Stay hydrated. Take over the counter fiber (metamucil or benefiber) and stool softeners (Miralax, docusate or senna) if  "becoming constipated.     Call for fever greater than 101.5, chills, increased size of incision, red skin around incision, vision changes, muscle strength changes, sensation changes, shortness of breath, or other concerns.    No driving while taking narcotic pain medication.    Transition to ibuprofen or tylenol/acetaminophen for pain control. Do not take tylenol/acetaminophen and acetaminophen containing narcotic (e.g., percocet or vicodin) at the same time. If you have known ulcer problems, or kidney trouble (elevated creatinine) do not take the ibuprofen.    In emergencies, call 911    For other Questions or Concerns;   A.) During weekday working hours (Monday through Friday 8am to 4:30pm)   call [phone] and ask to speak to a clinical nurse specialist.     B.) At nights (after 4:30pm), on weekends, or if urgent call [phone] and   tell the  \"I would like to page job code [phone], the thoracic surgery   fellow on call, please.\"                  Follow-up Appointments     Adult [facility] Follow-up and recommended labs and tests       1.) Follow up with primary care physician, Andrea Nino in 1-2 weeks.  2.) For EGD and stent revision in 3 weeks time.   If you have further questions, you may call [phone] and the Thoracic office will help you to set up the appointment.                  Your next 10 appointments already scheduled     Aug 28, 2017  3:50 PM CDT   (Arrive by 3:35 PM)   New Patient Visit with Richard Vigil MD   [facility] ([facility])    [address] [place] [id number] [phone]              Further instructions from your care team       [facility], [place]  Same-Day Surgery   Adult Discharge Orders & Instructions     For 24 hours after surgery    1. Get plenty of rest.  A responsible adult must stay with you for at least 24 " hours after you leave the hospital.   2. Do not drive or use heavy equipment.  If you have weakness or tingling, don't drive or use heavy equipment until this feeling goes away.  3. Do not drink alcohol.  4. Avoid strenuous or risky activities.  Ask for help when climbing stairs.   5. You may feel lightheaded.  IF so, sit for a few minutes before standing.  Have someone help you get up.   6. If you have nausea (feel sick to your stomach): Drink only clear liquids such as apple juice, ginger ale, broth or 7-Up.  Rest may also help.  Be sure to drink enough fluids.  Move to a regular diet as you feel able.  7. You may have a slight fever. Call the doctor if your fever is over 100 F (37.7 C) (taken under the tongue) or lasts longer than 24 hours.  8. You may have a dry mouth, a sore throat, muscle aches or trouble sleeping.  These should go away after 24 hours.  9. Do not make important or legal decisions.   Call your doctor for any of the followin.  Signs of infection (fever, growing tenderness at the surgery site, a large amount of drainage or bleeding, severe pain, foul-smelling drainage, redness, swelling).    2. It has been over 8 to 10 hours since surgery and you are still not able to urinate (pass water).    3.  Headache for over 24 hours.    To contact a doctor, call Dr. Nalini Barreto @ [phone] (clinic) or [phone] (office) or:        [phone] and ask for the resident on call for Thoracic Surgery (answered 24 hours a day)      Emergency Department:    [facility]: [phone]       (TTY for hearing impaired: [phone])           Tips for taking pain medications  To get the best pain relief possible , remember these points:      Take pain medications as directed, before pain becomes severe      Pain medication can upset your stomach: taking it with food may help      Constipation is a common side effect of pain medication. Drink plenty of  Fluids      Eat foods high in fiber. Take a  "stool softener  if recommended by your doctor or  Pharmacist.        Do not drink alcohol, drive or operate machinery while taking pain medications.      Ask about other ways to control pain, such as with heat, ice or relaxation.        Pending Results     Date and Time Order Name Status Description    2017 0910 XR Chest Port 1 View Preliminary             Admission Information     Date & Time Provider Department Dept. Phone    2017 Nalini Barreto MD Post Anesthesia Care Unit [facility] [phone]      Your Vitals Were     Blood Pressure Pulse Temperature Respirations Height Weight    129/68 64 97.6  F (36.4  C) 16 1.524 m (5') 39.4 kg (86 lb 13.8 oz)    Pulse Oximetry BMI (Body Mass Index)                100% 16.96 kg/m2          [employer] Information     [employer] lets you send messages to your doctor, view your test results, renew your prescriptions, schedule appointments and more. To sign up, go to [email or web address] . Click on \"Log in\" on the left side of the screen, which will take you to the Welcome page. Then click on \"Sign up Now\" on the right side of the page.     You will be asked to enter the access code listed below, as well as some personal information. Please follow the directions to create your username and password.     Your access code is: [email or web address]  Expires: 2017 11:41 AM     Your access code will  in 90 days. If you need help or a new code, please call your [facility] clinic or [phone].        Care EveryWhere ID     This is your Care EveryWhere ID. This could be used by other organizations to access your [facility] medical records  [id number]        Equal Access to Services     TOM JORGENSEN : Hadii davion Meng, isis romero, qashira anderson, florentin saldivar. So [facility] [phone].    ATENCIÓN: Si habla español, tiene a jackson disposición servicios gratuitos de asistencia lingüística. Llame al [phone].    We comply " with applicable federal civil rights laws and [place] laws. We do not discriminate on the basis of race, color, national origin, age, disability sex, sexual orientation or gender identity.               Review of your medicines      CONTINUE these medicines which may have CHANGED, or have new prescriptions. If we are uncertain of the size of tablets/capsules you have at home, strength may be listed as something that might have changed.        Dose / Directions    loperamide 1 MG/5ML liquid   Commonly known as:  IMODIUM   This may have changed:  when to take this   Used for:  Bowel habit changes        Dose:  4 mg   Take 20 mLs (4 mg) by mouth 4 times daily as needed for diarrhea   Quantity:  200 mL   Refills:  0       traZODone 100 MG tablet   Commonly known as:  DESYREL   This may have changed:  how much to take   Used for:  Insomnia, unspecified type        Dose:  50 mg   0.5 tablets (50 mg) by Per G Tube route At Bedtime   Quantity:  30 tablet   Refills:  0         CONTINUE these medicines which have NOT CHANGED        Dose / Directions    acetaminophen 32 mg/mL solution   Commonly known as:  TYLENOL   Indication:  Pain   Used for:  Esophageal perforation        Dose:  975 mg   30.45 mLs (975 mg) by Per Feeding Tube route 3 times daily   Quantity:  300 mL   Refills:  1       BENADRYL PO        Dose:  25 mg   Take 25 mg by mouth nightly as needed   Refills:  0       chlorhexidine 0.12 % solution   Commonly known as:  PERIDEX   Indication:  Tooth Plaque   Used for:  Esophageal perforation        Dose:  15 mL   Swish and spit 15 mLs in mouth 2 times daily   Refills:  0       chlorhexidine 4 % liquid   Commonly known as:  HIBICLENS   Used for:  History of esophagectomy        Apply topically 2 times daily   Quantity:  200 mL   Refills:  0       cholestyramine 4 G Packet   Commonly known as:  QUESTRAN        Dose:  1 packet   Take 1 packet by mouth daily   Refills:  0       clotrimazole 1 % cream   Commonly known  as:  LOTRIMIN   Used for:  Wound abscess, subsequent encounter        Apply topically 2 times daily   Quantity:  12 g   Refills:  1       CULTURELLE PO        Dose:  1 tablet   Take 1 tablet by mouth 2 times daily   Refills:  0       * diphenoxylate-atropine 2.5-0.025 MG/5ML liquid   Commonly known as:  LOMOTIL   Used for:  Bowel habit changes        Dose:  5 mL   Take 5 mLs by mouth 4 times daily as needed for diarrhea   Quantity:  300 mL   Refills:  0       * diphenoxylate-atropine 2.5-0.025 MG per tablet   Commonly known as:  LOMOTIL   Used for:  Esophageal anastomotic leak        Dose:  1 tablet   Take 1 tablet by mouth 4 times daily as needed for diarrhea   Quantity:  40 tablet   Refills:  1       fentaNYL 75 mcg/hr 72 hr patch   Commonly known as:  DURAGESIC   Used for:  Esophageal perforation, Acute post-operative pain        Dose:  1 patch   Place 1 patch onto the skin every 72 hours   Quantity:  5 patch   Refills:  0       ferrous sulfate 300 (60 FE) MG/5ML syrup   Used for:  Anemia due to other cause        Dose:  300 mg   5 mLs (300 mg) by Per J Tube route daily   Quantity:  150 mL   Refills:  0       fiber modular packet   Used for:  History of esophagectomy        Dose:  1 packet   1 packet by Per Feeding Tube route 3 times daily (with meals)   Quantity:  60 packet   Refills:  0       gabapentin 250 MG/5ML solution   Commonly known as:  NEURONTIN   Used for:  Acute post-operative pain        Dose:  300 mg   Take 6 mLs (300 mg) by mouth every 8 hours   Quantity:  550 mL   Refills:  0       metoprolol 10 mg/mL Susp   Commonly known as:  LOPRESSOR   Used for:  Atrial fibrillation, unspecified type (H)        Dose:  12.5 mg   1.25 mLs (12.5 mg) by Per J Tube route 2 times daily   Refills:  0       multivitamins with minerals Liqd liquid        Dose:  15 mL   Take 15 mLs by mouth daily   Refills:  0       opium tincture 10 MG/ML (1%) liquid   Used for:  Bowel habit changes        Dose:  0.6 mL   Take 0.6 mLs  (6 mg) by mouth every 6 hours as needed for diarrhea or moderate pain   Quantity:  118 mL   Refills:  0       * order for DME   Used for:  Hx of esophagectomy        Equipment being ordered:  [facility] Suction Machine-Intermittent Suction Canisters(2) Suction Canister Holders(2) Suction Connect Tube(2) 5 in 1 Connector(2) Bacteria Filter(2) Yaunkauer Suction(2)  Treatment Diagnosis: Esophogeal Perforation, s/p esophagectomy   Quantity:  1 Device   Refills:  0       * order for DME   Used for:  Esophageal perforation        Equipment being ordered:  Suction Pump Suction Canister(2) Suction Tubing(2) Bacteria Filter(2) Yaunkauer(4) Red Rubber Catheter(2)  Treatment Diagnosis: Esophogeal Perforation, s/p esophagectomy   Quantity:  1 Device   Refills:  0       oxyCODONE 5 MG IR tablet   Commonly known as:  ROXICODONE   Used for:  Esophageal anastomotic leak        Dose:  5 mg   Take 1 tablet (5 mg) by mouth every 6 hours as needed for pain or other (Moderate to Severe)   Quantity:  120 tablet   Refills:  0       pantoprazole 40 MG EC tablet   Commonly known as:  PROTONIX   Used for:  Gastroesophageal reflux disease, esophagitis presence not specified        Take by mouth 30-60 minutes before a meal.   Quantity:  30 tablet   Refills:  0       simethicone 40 MG/0.6ML suspension   Commonly known as:  MYLICON   Used for:  History of esophagectomy        Dose:  40 mg   0.6 mLs (40 mg) by Per Feeding Tube route 4 times daily   Refills:  0       sodium chloride (PF) 0.9% PF flush   Used for:  Gastroesophageal reflux disease, esophagitis presence not specified        Dose:  30 mL   30 mLs by Intracatheter route every 8 hours   Quantity:  1500 mL   Refills:  0       * Notice:  This list has 4 medication(s) that are the same as other medications prescribed for you. Read the directions carefully, and ask your doctor or other care provider to review them with you.         Where to get your medicines      These medications were sent to  [facility] - [place], [place] - [address]  [address], [facility] [address]     Phone:  [phone]     acetaminophen 32 mg/mL solution         Some of these will need a paper prescription and others can be bought over the counter. Ask your nurse if you have questions.     Bring a paper prescription for each of these medications     oxyCODONE 5 MG IR tablet                Protect others around you: Learn how to safely use, store and throw away your medicines at www.disposemymeds.org.             Medication List: This is a list of all your medications and when to take them. Check marks below indicate your daily home schedule. Keep this list as a reference.      Medications           Morning Afternoon Evening Bedtime As Needed    acetaminophen 32 mg/mL solution   Commonly known as:  TYLENOL   30.45 mLs (975 mg) by Per Feeding Tube route 3 times daily                                BENADRYL PO   Take 25 mg by mouth nightly as needed                                chlorhexidine 0.12 % solution   Commonly known as:  PERIDEX   Swish and spit 15 mLs in mouth 2 times daily                                chlorhexidine 4 % liquid   Commonly known as:  HIBICLENS   Apply topically 2 times daily                                cholestyramine 4 G Packet   Commonly known as:  QUESTRAN   Take 1 packet by mouth daily                                clotrimazole 1 % cream   Commonly known as:  LOTRIMIN   Apply topically 2 times daily                                CULTURELLE PO   Take 1 tablet by mouth 2 times daily                                * diphenoxylate-atropine 2.5-0.025 MG/5ML liquid   Commonly known as:  LOMOTIL   Take 5 mLs by mouth 4 times daily as needed for diarrhea                                * diphenoxylate-atropine 2.5-0.025 MG per tablet   Commonly known as:  LOMOTIL   Take 1 tablet by mouth 4 times daily as needed for diarrhea                                fentaNYL 75 mcg/hr  72 hr patch   Commonly known as:  DURAGESIC   Place 1 patch onto the skin every 72 hours                                ferrous sulfate 300 (60 FE) MG/5ML syrup   5 mLs (300 mg) by Per J Tube route daily                                fiber modular packet   1 packet by Per Feeding Tube route 3 times daily (with meals)                                gabapentin 250 MG/5ML solution   Commonly known as:  NEURONTIN   Take 6 mLs (300 mg) by mouth every 8 hours                                loperamide 1 MG/5ML liquid   Commonly known as:  IMODIUM   Take 20 mLs (4 mg) by mouth 4 times daily as needed for diarrhea                                metoprolol 10 mg/mL Susp   Commonly known as:  LOPRESSOR   1.25 mLs (12.5 mg) by Per J Tube route 2 times daily                                multivitamins with minerals Liqd liquid   Take 15 mLs by mouth daily                                opium tincture 10 MG/ML (1%) liquid   Take 0.6 mLs (6 mg) by mouth every 6 hours as needed for diarrhea or moderate pain                                * order for DME   Equipment being ordered:  [facility] Suction Machine-Intermittent Suction Canisters(2) Suction Canister Holders(2) Suction Connect Tube(2) 5 in 1 Connector(2) Bacteria Filter(2) Yaunkauer Suction(2)  Treatment Diagnosis: Esophogeal Perforation, s/p esophagectomy                                * order for DME   Equipment being ordered:  Suction Pump Suction Canister(2) Suction Tubing(2) Bacteria Filter(2) Yaunkauer(4) Red Rubber Catheter(2)  Treatment Diagnosis: Esophogeal Perforation, s/p esophagectomy                                oxyCODONE 5 MG IR tablet   Commonly known as:  ROXICODONE   Take 1 tablet (5 mg) by mouth every 6 hours as needed for pain or other (Moderate to Severe)                                pantoprazole 40 MG EC tablet   Commonly known as:  PROTONIX   Take by mouth 30-60 minutes before a meal.                                simethicone 40 MG/0.6ML suspension    Commonly known as:  MYLICON   0.6 mLs (40 mg) by Per Feeding Tube route 4 times daily                                sodium chloride (PF) 0.9% PF flush   30 mLs by Intracatheter route every 8 hours                                traZODone 100 MG tablet   Commonly known as:  DESYREL   0.5 tablets (50 mg) by Per G Tube route At Bedtime                                * Notice:  This list has 4 medication(s) that are the same as other medications prescribed for you. Read the directions carefully, and ask your doctor or other care provider to review them with you.

## 2017-08-04 NOTE — ANESTHESIA CARE TRANSFER NOTE
Patient: Minerva Blanco    Procedure(s):  Esophagogastroduodenoscopy, Stent Removal and Stent Replacement. Dressing Change  - Wound Class: II-Clean Contaminated    Diagnosis: Esophagectomy, Anastomotic Leak   Diagnosis Additional Information: No value filed.    Anesthesia Type:   General, ETT, RSI     Note:  Airway :Face Mask  Patient transferred to:PACU  Comments: Pt transferred to PACU.  Maintaining airway and oxygenation on FM.  VSS.  Denies nausea.  Report to RN.  All questions answered.       Vitals: (Last set prior to Anesthesia Care Transfer)    CRNA VITALS  8/4/2017 0846 - 8/4/2017 0926      8/4/2017             Pulse: 81    SpO2: 100 %    Resp Rate (set): 10    EKG: Sinus rhythm                Electronically Signed By: CLIVE Hsieh CRNA  August 4, 2017  9:26 AM

## 2017-08-04 NOTE — IP AVS SNAPSHOT
Post Anesthesia Care Unit [address] [address]    Phone:  [phone]                                       After Visit Summary   8/4/2017    Minerva Blanco    MRN: [id number]           After Visit Summary Signature Page     I have received my discharge instructions, and my questions have been answered. I have discussed any challenges I see with this plan with the nurse or doctor.    ..........................................................................................................................................  Patient/Patient Representative Signature      ..........................................................................................................................................  Patient Representative Print Name and Relationship to Patient    ..................................................               ................................................  Date                                            Time    ..........................................................................................................................................  Reviewed by Signature/Title    ...................................................              ..............................................  Date                                                            Time

## 2017-08-04 NOTE — BRIEF OP NOTE
[facility], [place]    Brief Operative Note    Pre-operative diagnosis: Esophagectomy, Anastomotic Leak   Post-operative diagnosis Esophagectomy, anastomotic leak  Procedure: Procedure(s):  Esophagogastroduodenoscopy, Stent Revision  - Wound Class: II-Clean Contaminated  Surgeon: Surgeon(s) and Role:     * Nalini Barreto MD - Primary     * Jens Wise MD - Assisting  Anesthesia: General   Estimated blood loss: Minimal  Drains: None  Specimens:   ID Type Source Tests Collected by Time Destination   A : Esophageal Stent  Other (specify in comments) Other SURGICAL PATHOLOGY EXAM Nalini Barreto MD 8/4/2017  8:15 AM      Findings:   Interval decrease in size of perforation  Complications: None.  Implants: Esophageal stent.

## 2017-08-07 NOTE — OP NOTE
DATE OF PROCEDURE:  2017      I am dictating as cosurgeon with Dr. Barreto (this was a challenging case and I just participated in decision making).      DESCRIPTION OF PROCEDURE:  Once we removed the esophageal stent with a rat-tooth forceps, we examined the airway and there was just a pinpoint opening, and still some mild bubbling from the wound.  For this reason, we decided to replace the stent.      Please refer to Dr. Barreto's operative report for details.         NIRALI LOPEZ MD             D: 2017 21:41   T: 2017 02:50   MT: gypsy      Name:     FAVIAN MALONE   MRN:      [id number]        Account:        [id number]   :      1945           Procedure Date: 2017      Document: [id number]       cc: [facility] Surgery Billing

## 2017-08-08 LAB — COPATH REPORT: NORMAL

## 2017-08-08 NOTE — OP NOTE
DATE OF PROCEDURE:  2017.      PREOPERATIVE DIAGNOSIS:  Esophageal leak status post esophagectomy.      POSTOPERATIVE DIAGNOSIS:  Esophageal leak status post esophagectomy.      PROCEDURES PERFORMED:  Esophagogastroduodenoscopy, removal of preexisting stent and replacement of esophageal stent.      OPERATIVE FINDINGS:  Persistent pinpoint hole in the anastomosis.      SURGEON:  Conchis Marrero MD      ASSISTANT SURGEON:  RE Campo.      COSURGEON:   Jens Wise MD      DESCRIPTION OF PROCEDURE:  After obtaining informed consent, the patient, Favian Malone, was brought to the operating room and laid supine on the operating table.  After induction of general anesthesia and introduction of an endotracheal tube, endoscopy was performed.  The esophageal stent was noted to be in appropriate position.  This was then removed with a rat tooth forceps and the esophagus was examined.  We were still able to see a small pinpoint hole at the anastomosis.  Upon insufflation, there was bubbling at the wound.  At this point, we replaced a new 19 x 17 mm [id number] stent using fluoroscopic guidance.  A repeat endoscopy confirmed adequate positioning of the stent.  The patient was then extubated and transferred to the recovery room.  She tolerated the procedure well.         CONCHIS MARRERO MD             D: 2017 16:59   T: 2017 03:26   MT: gypsy      Name:     FAVIAN MALONE   MRN:      [id number]        Account:        [id number]   :      1945           Procedure Date: 2017      Document: [id number]

## 2017-08-10 DIAGNOSIS — K22.3 ESOPHAGEAL PERFORATION: Primary | ICD-10-CM

## 2017-08-11 ENCOUNTER — TELEPHONE (OUTPATIENT)
Dept: SURGERY | Facility: CLINIC | Age: 72
End: 2017-08-11

## 2017-08-11 NOTE — TELEPHONE ENCOUNTER
Spoke with Minerva about the timing of her upcoming surgery with Dr. Wise on 8/25. She is aware that she needs to arrive on 3c at 5:30am and that this time can change, if it does she will be contacted by PAC. She has my number [phone] for questions

## 2017-08-16 ENCOUNTER — PRE VISIT (OUTPATIENT)
Dept: ANESTHESIOLOGY | Facility: CLINIC | Age: 72
End: 2017-08-16

## 2017-08-16 NOTE — TELEPHONE ENCOUNTER
1.  Date/reason for appt: 8/28/17 3:50PM Pain evaluation.- Referring provider informed that clinic will not be prescribing.  2.  Referring provider: Nicole BRUNSON    3.  Call to patient (Yes / No - short description): Yes, called and spoke with pt who states there are no outside recs.   4.  Previous care at / records requested from:  [facility] -- Recs are in Epic / Images in PACS   [facility] ED 7/18/17 -- Recs are in Epic   Combined Incision and Drainage Chest Washout 7/17/17   Referral 2/1/17 Nicole BRUNSON

## 2017-08-24 ENCOUNTER — ANESTHESIA EVENT (OUTPATIENT)
Dept: SURGERY | Facility: CLINIC | Age: 72
End: 2017-08-24
Payer: MEDICARE

## 2017-08-24 ENCOUNTER — TELEPHONE (OUTPATIENT)
Dept: SURGERY | Facility: CLINIC | Age: 72
End: 2017-08-24

## 2017-08-24 NOTE — TELEPHONE ENCOUNTER
"I received a call from Cate in PAC; she had spoken to Minerva today about surgery planned tomorrow.   Minerva told her, incidentally, that she had a cyst under her left breast drained/lanced yesterday.   She said it was not infected, did not get antibiotics, and that it had been there for a long time but had \"come to a head\" and needed to be dealt with.    Cate wanted us to know about this in case it impacted her scheduled procedure (EGD with possible stent revision)  "

## 2017-08-24 NOTE — OR NURSING
"Pt had a cyst removed from left breast on 8/23 which is still draining a \"bloody\" drainage. Pt was not put on antibiotics.  I spoke with Silvia in clinic to have her notify Dr. Wise.    "

## 2017-08-25 ENCOUNTER — APPOINTMENT (OUTPATIENT)
Dept: GENERAL RADIOLOGY | Facility: CLINIC | Age: 72
End: 2017-08-25
Attending: THORACIC SURGERY (CARDIOTHORACIC VASCULAR SURGERY)
Payer: MEDICARE

## 2017-08-25 ENCOUNTER — ANESTHESIA (OUTPATIENT)
Dept: SURGERY | Facility: CLINIC | Age: 72
End: 2017-08-25
Payer: MEDICARE

## 2017-08-25 ENCOUNTER — HOSPITAL ENCOUNTER (OUTPATIENT)
Facility: CLINIC | Age: 72
Discharge: HOME | End: 2017-08-25
Attending: THORACIC SURGERY (CARDIOTHORACIC VASCULAR SURGERY) | Admitting: THORACIC SURGERY (CARDIOTHORACIC VASCULAR SURGERY)
Payer: MEDICARE

## 2017-08-25 VITALS
WEIGHT: 87.52 LBS | RESPIRATION RATE: 16 BRPM | HEIGHT: 60 IN | SYSTOLIC BLOOD PRESSURE: 101 MMHG | TEMPERATURE: 97.6 F | OXYGEN SATURATION: 96 % | DIASTOLIC BLOOD PRESSURE: 64 MMHG | BODY MASS INDEX: 17.18 KG/M2

## 2017-08-25 DIAGNOSIS — K91.89 ESOPHAGEAL ANASTOMOTIC LEAK: Primary | ICD-10-CM

## 2017-08-25 PROCEDURE — 25000565 ZZH ISOFLURANE, EA 15 MIN: Performed by: THORACIC SURGERY (CARDIOTHORACIC VASCULAR SURGERY)

## 2017-08-25 PROCEDURE — 25000128 H RX IP 250 OP 636: Performed by: ANESTHESIOLOGY

## 2017-08-25 PROCEDURE — 37000008 ZZH ANESTHESIA TECHNICAL FEE, 1ST 30 MIN: Performed by: THORACIC SURGERY (CARDIOTHORACIC VASCULAR SURGERY)

## 2017-08-25 PROCEDURE — 36000055 ZZH SURGERY LEVEL 2 W FLUORO 1ST 30 MIN - UMMC: Performed by: THORACIC SURGERY (CARDIOTHORACIC VASCULAR SURGERY)

## 2017-08-25 PROCEDURE — 27210794 ZZH OR GENERAL SUPPLY STERILE: Performed by: THORACIC SURGERY (CARDIOTHORACIC VASCULAR SURGERY)

## 2017-08-25 PROCEDURE — 37000009 ZZH ANESTHESIA TECHNICAL FEE, EACH ADDTL 15 MIN: Performed by: THORACIC SURGERY (CARDIOTHORACIC VASCULAR SURGERY)

## 2017-08-25 PROCEDURE — 25000128 H RX IP 250 OP 636: Performed by: NURSE ANESTHETIST, CERTIFIED REGISTERED

## 2017-08-25 PROCEDURE — C9399 UNCLASSIFIED DRUGS OR BIOLOG: HCPCS | Performed by: NURSE ANESTHETIST, CERTIFIED REGISTERED

## 2017-08-25 PROCEDURE — 40000170 ZZH STATISTIC PRE-PROCEDURE ASSESSMENT II: Performed by: THORACIC SURGERY (CARDIOTHORACIC VASCULAR SURGERY)

## 2017-08-25 PROCEDURE — C1874 STENT, COATED/COV W/DEL SYS: HCPCS | Performed by: THORACIC SURGERY (CARDIOTHORACIC VASCULAR SURGERY)

## 2017-08-25 PROCEDURE — 27211024 ZZHC OR SUPPLY OTHER OPNP: Performed by: THORACIC SURGERY (CARDIOTHORACIC VASCULAR SURGERY)

## 2017-08-25 PROCEDURE — 25000125 ZZHC RX 250: Performed by: NURSE ANESTHETIST, CERTIFIED REGISTERED

## 2017-08-25 PROCEDURE — 71000014 ZZH RECOVERY PHASE 1 LEVEL 2 FIRST HR: Performed by: THORACIC SURGERY (CARDIOTHORACIC VASCULAR SURGERY)

## 2017-08-25 PROCEDURE — 40000277 XR SURGERY CARM FLUORO LESS THAN 5 MIN W STILLS: Mod: TC

## 2017-08-25 PROCEDURE — 36000053 ZZH SURGERY LEVEL 2 EA 15 ADDTL MIN - UMMC: Performed by: THORACIC SURGERY (CARDIOTHORACIC VASCULAR SURGERY)

## 2017-08-25 PROCEDURE — 71000027 ZZH RECOVERY PHASE 2 EACH 15 MINS: Performed by: THORACIC SURGERY (CARDIOTHORACIC VASCULAR SURGERY)

## 2017-08-25 DEVICE — STENT ESOPHAGEAL ENDOMAXX 19X120MM MAXX-1912
Type: IMPLANTABLE DEVICE | Site: ESOPHAGUS | Status: NON-FUNCTIONAL
Removed: 2017-09-15

## 2017-08-25 RX ORDER — ONDANSETRON 2 MG/ML
4 INJECTION INTRAMUSCULAR; INTRAVENOUS EVERY 30 MIN PRN
Status: DISCONTINUED | OUTPATIENT
Start: 2017-08-25 | End: 2017-08-25 | Stop reason: HOSPADM

## 2017-08-25 RX ORDER — ONDANSETRON 4 MG/1
4 TABLET, ORALLY DISINTEGRATING ORAL EVERY 30 MIN PRN
Status: DISCONTINUED | OUTPATIENT
Start: 2017-08-25 | End: 2017-08-25 | Stop reason: HOSPADM

## 2017-08-25 RX ORDER — SODIUM CHLORIDE, SODIUM LACTATE, POTASSIUM CHLORIDE, CALCIUM CHLORIDE 600; 310; 30; 20 MG/100ML; MG/100ML; MG/100ML; MG/100ML
INJECTION, SOLUTION INTRAVENOUS CONTINUOUS
Status: DISCONTINUED | OUTPATIENT
Start: 2017-08-25 | End: 2017-08-25 | Stop reason: HOSPADM

## 2017-08-25 RX ORDER — EPHEDRINE SULFATE 50 MG/ML
INJECTION, SOLUTION INTRAMUSCULAR; INTRAVENOUS; SUBCUTANEOUS PRN
Status: DISCONTINUED | OUTPATIENT
Start: 2017-08-25 | End: 2017-08-25

## 2017-08-25 RX ORDER — FENTANYL CITRATE 50 UG/ML
INJECTION, SOLUTION INTRAMUSCULAR; INTRAVENOUS PRN
Status: DISCONTINUED | OUTPATIENT
Start: 2017-08-25 | End: 2017-08-25

## 2017-08-25 RX ORDER — OXYCODONE HYDROCHLORIDE 5 MG/1
5-10 TABLET ORAL
Qty: 10 TABLET | Refills: 0 | Status: SHIPPED | OUTPATIENT
Start: 2017-08-25 | End: 2017-08-29

## 2017-08-25 RX ORDER — FENTANYL CITRATE 50 UG/ML
25-50 INJECTION, SOLUTION INTRAMUSCULAR; INTRAVENOUS
Status: DISCONTINUED | OUTPATIENT
Start: 2017-08-25 | End: 2017-08-25 | Stop reason: HOSPADM

## 2017-08-25 RX ORDER — PROPOFOL 10 MG/ML
INJECTION, EMULSION INTRAVENOUS PRN
Status: DISCONTINUED | OUTPATIENT
Start: 2017-08-25 | End: 2017-08-25

## 2017-08-25 RX ORDER — ONDANSETRON 2 MG/ML
INJECTION INTRAMUSCULAR; INTRAVENOUS PRN
Status: DISCONTINUED | OUTPATIENT
Start: 2017-08-25 | End: 2017-08-25

## 2017-08-25 RX ORDER — OXYCODONE HYDROCHLORIDE 5 MG/1
5-10 TABLET ORAL
Status: DISCONTINUED | OUTPATIENT
Start: 2017-08-25 | End: 2017-08-25 | Stop reason: HOSPADM

## 2017-08-25 RX ORDER — NALOXONE HYDROCHLORIDE 0.4 MG/ML
.1-.4 INJECTION, SOLUTION INTRAMUSCULAR; INTRAVENOUS; SUBCUTANEOUS
Status: DISCONTINUED | OUTPATIENT
Start: 2017-08-25 | End: 2017-08-25 | Stop reason: HOSPADM

## 2017-08-25 RX ADMIN — FENTANYL CITRATE 50 MCG: 50 INJECTION, SOLUTION INTRAMUSCULAR; INTRAVENOUS at 09:19

## 2017-08-25 RX ADMIN — PHENYLEPHRINE HYDROCHLORIDE 100 MCG: 10 INJECTION, SOLUTION INTRAMUSCULAR; INTRAVENOUS; SUBCUTANEOUS at 07:55

## 2017-08-25 RX ADMIN — Medication 10 MG: at 07:51

## 2017-08-25 RX ADMIN — PROPOFOL 120 MG: 10 INJECTION, EMULSION INTRAVENOUS at 07:39

## 2017-08-25 RX ADMIN — FENTANYL CITRATE 25 MCG: 50 INJECTION, SOLUTION INTRAMUSCULAR; INTRAVENOUS at 09:11

## 2017-08-25 RX ADMIN — ONDANSETRON 4 MG: 2 INJECTION INTRAMUSCULAR; INTRAVENOUS at 08:16

## 2017-08-25 RX ADMIN — PROPOFOL 40 MG: 10 INJECTION, EMULSION INTRAVENOUS at 08:08

## 2017-08-25 RX ADMIN — PROPOFOL 30 MG: 10 INJECTION, EMULSION INTRAVENOUS at 08:33

## 2017-08-25 RX ADMIN — Medication 80 MG: at 07:39

## 2017-08-25 RX ADMIN — ROCURONIUM BROMIDE 10 MG: 10 INJECTION INTRAVENOUS at 08:09

## 2017-08-25 RX ADMIN — Medication 5 MG: at 07:55

## 2017-08-25 RX ADMIN — SODIUM CHLORIDE, POTASSIUM CHLORIDE, SODIUM LACTATE AND CALCIUM CHLORIDE: 600; 310; 30; 20 INJECTION, SOLUTION INTRAVENOUS at 07:27

## 2017-08-25 RX ADMIN — SUGAMMADEX 75 MG: 100 INJECTION, SOLUTION INTRAVENOUS at 08:41

## 2017-08-25 RX ADMIN — FENTANYL CITRATE 50 MCG: 50 INJECTION, SOLUTION INTRAMUSCULAR; INTRAVENOUS at 08:10

## 2017-08-25 RX ADMIN — FENTANYL CITRATE 50 MCG: 50 INJECTION, SOLUTION INTRAMUSCULAR; INTRAVENOUS at 07:39

## 2017-08-25 RX ADMIN — MIDAZOLAM HYDROCHLORIDE 2 MG: 1 INJECTION, SOLUTION INTRAMUSCULAR; INTRAVENOUS at 07:28

## 2017-08-25 ASSESSMENT — ENCOUNTER SYMPTOMS
SEIZURES: 0
DYSRHYTHMIAS: 1

## 2017-08-25 ASSESSMENT — LIFESTYLE VARIABLES: TOBACCO_USE: 1

## 2017-08-25 NOTE — IP AVS SNAPSHOT
MRN:[id number]                      After Visit Summary   8/25/2017    Minerva Blanco    MRN: [id number]           Thank you!     Thank you for choosing [facility] for your care. Our goal is always to provide you with excellent care. Hearing back from our patients is one way we can continue to improve our services. Please take a few minutes to complete the written survey that you may receive in the mail after you visit with us. Thank you!        Patient Information     Date Of Birth          1945        About your hospital stay     You were admitted on:  August 25, 2017 You last received care in the:  Post Anesthesia Care Unit [facility]    You were discharged on:  August 25, 2017       Who to Call     For medical emergencies, please call 911.  For non-urgent questions about your medical care, please call your primary care provider or clinic, [phone]  For questions related to your surgery, please call your surgery clinic        Attending Provider     Provider Specialty    Jens Wise MD Thoracic Diseases       Primary Care Provider Office Phone # Fax #    Andrea Nino [phone]      After Care Instructions     Discharge Instructions       Continue tube feeds.  Ice chips and popsicles only per mouth.  Continue with routine wound care to chest wound.  Change dressing daily and as needed.  Will plan to return to OR for repeat procedure in approximately 3 weeks.                  Your next 10 appointments already scheduled     Aug 28, 2017  3:50 PM CDT   (Arrive by 3:35 PM)   New Patient Visit with Richard Vigil MD   [facility] ([facility])    [address] [place] [address]  [address] [facility] [address] [phone]              Further instructions from your care team       [facility]  Same-Day Surgery   Adult Discharge Orders &  Instructions     For 24 hours after surgery    1. Get plenty of rest.  A responsible adult must stay with you for at least 24 hours after you leave the hospital.   2. Do not drive or use heavy equipment.  If you have weakness or tingling, don't drive or use heavy equipment until this feeling goes away.  3. Do not drink alcohol.  4. Avoid strenuous or risky activities.  Ask for help when climbing stairs.   5. You may feel lightheaded.  IF so, sit for a few minutes before standing.  Have someone help you get up.   6. If you have nausea (feel sick to your stomach): Drink only clear liquids such as apple juice, ginger ale, broth or 7-Up.  Rest may also help.  Be sure to drink enough fluids.  Move to a regular diet as you feel able.  7. You may have a slight fever. Call the doctor if your fever is over 100 F (37.7 C) (taken under the tongue) or lasts longer than 24 hours.  8. You may have a dry mouth, a sore throat, muscle aches or trouble sleeping.  These should go away after 24 hours.  9. Do not make important or legal decisions.   Call your doctor for any of the followin.  Signs of infection (fever, growing tenderness at the surgery site, a large amount of drainage or bleeding, severe pain, foul-smelling drainage, redness, swelling).    2. It has been over 8 to 10 hours since surgery and you are still not able to urinate (pass water).    3.  Headache for over 24 hours.    4.  Numbness, tingling or weakness the day after surgery (if you had spinal anesthesia).  To contact a doctor, call ___Dr OLIMPIA Wise's office @ [phone]  ____ or:    X   [phone] and ask for the resident on call for   _____General Surgery _ (answered 24 hours a day)  X    Emergency Department:    [facility]: [phone]           Tips for taking pain medications  To get the best pain relief possible , remember these points:      Take pain medications as directed, before pain becomes severe      Pain medication can upset your  stomach: taking it with food may help      Constipation is a common side effect of pain medication. Drink plenty of  Fluids      Eat foods high in fiber. Take a stool softener  if recommended by your doctor or  Pharmacist.        Do not drink alcohol, drive or operate machinery while taking pain medications.      Ask about other ways to control pain, such as with heat, ice or relaxation.    Upper GI Endoscopy     During endoscopy, a long, flexible tube is used to view the inside of your upper GI tract.      Upper GI endoscopy allows your healthcare provider to look directly into the beginning of your gastrointestinal (GI) tract. The esophagus, stomach, and duodenum (the first part of the small intestine) make up the upper GI tract.   Before the exam  Follow these and any other instructions you are given before your endoscopy. If you don t follow the healthcare provider s instructions carefully, the test may need to be canceled or done over:    Don't eat or drink anything after midnight the night before your exam. If your exam is in the afternoon, drink only clear liquids in the morning. Don't eat or drink anything for 8 hours before the exam. In some cases, you may be able to take medicines with sips of water until 2 hours before the procedure. Speak with your healthcare provider about this.     Bring your X-rays and any other test results you have.    Because you will be sedated, arrange for an adult to drive you home after the exam.    Tell your healthcare provider before the exam if you are taking any medicines or have any medical problems.  The procedure  Here is what to expect:    You will lie on the endoscopy table. Usually patients lie on the left side.    You will be monitored and given oxygen.    Your throat may be numbed with a spray or gargle. You are given medicine through an intravenous (IV) line that will help you relax and remain comfortable. You may be awake or asleep during the procedure.    The  healthcare provider will put the endoscope in your mouth and down your esophagus. It is thinner than most pieces of food that you swallow. It will not affect your breathing. The medicine helps keep you from gagging.    Air is put into your GI tract to expand it. It can make you burp.    During the procedure, the healthcare provider can take biopsies (tissue samples), remove abnormalities, such as polyps, or treat abnormalities through a variety of devices placed through the endoscope. You will not feel this.     The endoscope carries images of your upper GI tract to a video screen. If you are awake, you may be able to look at the images.    After the procedure is done, you will rest for a time. An adult must drive you home.  When to call your healthcare provider  Contact your healthcare provider if you have:    Black or tarry stools, or blood in your stool    Fever    Pain in your belly that does not go away    Nausea and vomiting, or vomiting blood   Date Last Reviewed: 7/1/2016 2000-2017 [employer]. [address] [place] [address]. All rights reserved. This information is not intended as a substitute for professional medical care. Always follow your healthcare professional's instructions.                         Pending Results     No orders found from 8/23/2017 to 8/26/2017.            Admission Information     Date & Time Provider Department Dept. Phone    8/25/2017 Jens Wise MD Post Anesthesia Care Unit [facility] [phone]      Your Vitals Were     Blood Pressure Temperature Respirations Height Weight Pulse Oximetry    105/62 97.9  F (36.6  C) (Oral) 16 1.524 m (5') 39.7 kg (87 lb 8.4 oz) 96%    BMI (Body Mass Index)                   17.09 kg/m2           MyChart Information     [email or web address] lets you send messages to your doctor, view your test results, renew your prescriptions, schedule appointments and more. To sign up, go to [email or web address] . Click on  "\"Log in\" on the left side of the screen, which will take you to the Welcome page. Then click on \"Sign up Now\" on the right side of the page.     You will be asked to enter the access code listed below, as well as some personal information. Please follow the directions to create your username and password.     Your access code is: [email or web address]  Expires: 2017 12:26 PM     Your access code will  in 90 days. If you need help or a new code, please call your [facility] clinic or [phone].        Care EveryWhere ID     This is your Care EveryWhere ID. This could be used by other organizations to access your [facility] medical records  [id number]        Equal Access to Services     TOM JORGENSEN : Dora Meng, wagillian romero, katharina marrufoalmoira anderson, florentin saldivar. So [facility] [place] [facility] [phone].    ATENCIÓN: Si habla español, tiene a jackson disposición servicios gratuitos de asistencia lingüística. [facility] [phone].    We comply with applicable federal civil rights laws and [place] laws. We do not discriminate on the basis of race, color, national origin, age, disability sex, sexual orientation or gender identity.               Review of your medicines      CONTINUE these medicines which may have CHANGED, or have new prescriptions. If we are uncertain of the size of tablets/capsules you have at home, strength may be listed as something that might have changed.        Dose / Directions    loperamide 1 MG/5ML liquid   Commonly known as:  IMODIUM   This may have changed:  when to take this   Used for:  Bowel habit changes        Dose:  4 mg   Take 20 mLs (4 mg) by mouth 4 times daily as needed for diarrhea   Quantity:  200 mL   Refills:  0       oxyCODONE 5 MG IR tablet   Commonly known as:  ROXICODONE   This may have changed:    - how much to take  - when to take this   Used for:  Esophageal anastomotic leak        Dose:  5-10 mg   Take 1-2 tablets (5-10 mg) by mouth every " 3 hours as needed for pain or other (Moderate to Severe)   Quantity:  10 tablet   Refills:  0       traZODone 100 MG tablet   Commonly known as:  DESYREL   This may have changed:  how much to take   Used for:  Insomnia, unspecified type        Dose:  50 mg   0.5 tablets (50 mg) by Per G Tube route At Bedtime   Quantity:  30 tablet   Refills:  0         CONTINUE these medicines which have NOT CHANGED        Dose / Directions    acetaminophen 32 mg/mL solution   Commonly known as:  TYLENOL   Indication:  Pain   Used for:  Esophageal perforation        Dose:  975 mg   30.45 mLs (975 mg) by Per Feeding Tube route 3 times daily   Quantity:  300 mL   Refills:  1       BENADRYL PO        Dose:  25 mg   Take 25 mg by mouth nightly as needed   Refills:  0       chlorhexidine 0.12 % solution   Commonly known as:  PERIDEX   Indication:  Tooth Plaque   Used for:  Esophageal perforation        Dose:  15 mL   Swish and spit 15 mLs in mouth 2 times daily   Refills:  0       chlorhexidine 4 % liquid   Commonly known as:  HIBICLENS   Used for:  History of esophagectomy        Apply topically 2 times daily   Quantity:  200 mL   Refills:  0       cholestyramine 4 G Packet   Commonly known as:  QUESTRAN        Dose:  1 packet   Take 1 packet by mouth daily   Refills:  0       clotrimazole 1 % cream   Commonly known as:  LOTRIMIN   Used for:  Wound abscess, subsequent encounter        Apply topically 2 times daily   Quantity:  12 g   Refills:  1       CULTURELLE PO        Dose:  1 tablet   Take 1 tablet by mouth 2 times daily   Refills:  0       * diphenoxylate-atropine 2.5-0.025 MG/5ML liquid   Commonly known as:  LOMOTIL   Used for:  Bowel habit changes        Dose:  5 mL   Take 5 mLs by mouth 4 times daily as needed for diarrhea   Quantity:  300 mL   Refills:  0       * diphenoxylate-atropine 2.5-0.025 MG per tablet   Commonly known as:  LOMOTIL   Used for:  Esophageal anastomotic leak        Dose:  1 tablet   Take 1 tablet by mouth  4 times daily as needed for diarrhea   Quantity:  40 tablet   Refills:  1       ferrous sulfate 300 (60 FE) MG/5ML syrup   Used for:  Anemia due to other cause        Dose:  300 mg   5 mLs (300 mg) by Per J Tube route daily   Quantity:  150 mL   Refills:  0       fiber modular packet   Used for:  History of esophagectomy        Dose:  1 packet   1 packet by Per Feeding Tube route 3 times daily (with meals)   Quantity:  60 packet   Refills:  0       gabapentin 250 MG/5ML solution   Commonly known as:  NEURONTIN   Used for:  Acute post-operative pain        Dose:  300 mg   Take 6 mLs (300 mg) by mouth every 8 hours   Quantity:  550 mL   Refills:  0       MELATONIN PO        Dose:  5 mg   Take 5 mg by mouth At Bedtime   Refills:  0       multivitamins with minerals Liqd liquid        Dose:  15 mL   Take 15 mLs by mouth daily   Refills:  0       * order for DME   Used for:  Hx of esophagectomy        Equipment being ordered:  [facility] Suction Machine-Intermittent Suction Canisters(2) Suction Canister Holders(2) Suction Connect Tube(2) 5 in 1 Connector(2) Bacteria Filter(2) [employer]auer Suction(2)  Treatment Diagnosis: Esophogeal Perforation, s/p esophagectomy   Quantity:  1 Device   Refills:  0       * order for DME   Used for:  Esophageal perforation        Equipment being ordered:  Suction Pump Suction Canister(2) Suction Tubing(2) Bacteria Filter(2) Yaunkauer(4) Red Rubber Catheter(2)  Treatment Diagnosis: Esophogeal Perforation, s/p esophagectomy   Quantity:  1 Device   Refills:  0       pantoprazole 40 MG EC tablet   Commonly known as:  PROTONIX   Used for:  Gastroesophageal reflux disease, esophagitis presence not specified        Take by mouth 30-60 minutes before a meal.   Quantity:  30 tablet   Refills:  0       UNABLE TO FIND        2 nell supplement 4 cans daily per g tube   Refills:  0       * Notice:  This list has 4 medication(s) that are the same as other medications prescribed for you. Read the directions  carefully, and ask your doctor or other care provider to review them with you.         Where to get your medicines      Some of these will need a paper prescription and others can be bought over the counter. Ask your nurse if you have questions.     Bring a paper prescription for each of these medications     oxyCODONE 5 MG IR tablet                Protect others around you: Learn how to safely use, store and throw away your medicines at www.disposemymeds.org.             Medication List: This is a list of all your medications and when to take them. Check marks below indicate your daily home schedule. Keep this list as a reference.      Medications           Morning Afternoon Evening Bedtime As Needed    acetaminophen 32 mg/mL solution   Commonly known as:  TYLENOL   30.45 mLs (975 mg) by Per Feeding Tube route 3 times daily                                BENADRYL PO   Take 25 mg by mouth nightly as needed                                chlorhexidine 0.12 % solution   Commonly known as:  PERIDEX   Swish and spit 15 mLs in mouth 2 times daily                                chlorhexidine 4 % liquid   Commonly known as:  HIBICLENS   Apply topically 2 times daily                                cholestyramine 4 G Packet   Commonly known as:  QUESTRAN   Take 1 packet by mouth daily                                clotrimazole 1 % cream   Commonly known as:  LOTRIMIN   Apply topically 2 times daily                                CULTURELLE PO   Take 1 tablet by mouth 2 times daily                                * diphenoxylate-atropine 2.5-0.025 MG/5ML liquid   Commonly known as:  LOMOTIL   Take 5 mLs by mouth 4 times daily as needed for diarrhea                                * diphenoxylate-atropine 2.5-0.025 MG per tablet   Commonly known as:  LOMOTIL   Take 1 tablet by mouth 4 times daily as needed for diarrhea                                ferrous sulfate 300 (60 FE) MG/5ML syrup   5 mLs (300 mg) by Per J Tube route  daily                                fiber modular packet   1 packet by Per Feeding Tube route 3 times daily (with meals)                                gabapentin 250 MG/5ML solution   Commonly known as:  NEURONTIN   Take 6 mLs (300 mg) by mouth every 8 hours                                loperamide 1 MG/5ML liquid   Commonly known as:  IMODIUM   Take 20 mLs (4 mg) by mouth 4 times daily as needed for diarrhea                                MELATONIN PO   Take 5 mg by mouth At Bedtime                                multivitamins with minerals Liqd liquid   Take 15 mLs by mouth daily                                * order for DME   Equipment being ordered:  [facility] Suction Machine-Intermittent Suction Canisters(2) Suction Canister Holders(2) Suction Connect Tube(2) 5 in 1 Connector(2) Bacteria Filter(2) [employer]auer Suction(2)  Treatment Diagnosis: Esophogeal Perforation, s/p esophagectomy                                * order for DME   Equipment being ordered:  Suction Pump Suction Canister(2) Suction Tubing(2) Bacteria Filter(2) Yaunkauer(4) Red Rubber Catheter(2)  Treatment Diagnosis: Esophogeal Perforation, s/p esophagectomy                                oxyCODONE 5 MG IR tablet   Commonly known as:  ROXICODONE   Take 1-2 tablets (5-10 mg) by mouth every 3 hours as needed for pain or other (Moderate to Severe)                                pantoprazole 40 MG EC tablet   Commonly known as:  PROTONIX   Take by mouth 30-60 minutes before a meal.                                traZODone 100 MG tablet   Commonly known as:  DESYREL   0.5 tablets (50 mg) by Per G Tube route At Bedtime                                UNABLE TO FIND   2 nell supplement 4 cans daily per g tube                                * Notice:  This list has 4 medication(s) that are the same as other medications prescribed for you. Read the directions carefully, and ask your doctor or other care provider to review them with you.

## 2017-08-25 NOTE — ANESTHESIA POSTPROCEDURE EVALUATION
Patient: Minerva Blanco    Procedure(s):  Esophagogastroduodenoscopy,  Stent Revision,  Cauterization - Wound Class: II-Clean Contaminated   - Wound Class: II-Clean Contaminated    Diagnosis:Esophageal Perforation   Diagnosis Additional Information: No value filed.    Anesthesia Type:  General, ETT, RSI    Note:  Anesthesia Post Evaluation    Patient location during evaluation: PACU  Patient participation: Able to fully participate in evaluation  Level of consciousness: awake and alert  Pain management: adequate  Airway patency: patent  Cardiovascular status: acceptable  Respiratory status: acceptable  Hydration status: acceptable  PONV: none     Anesthetic complications: None          Last vitals:  Vitals:    08/25/17 0550 08/25/17 0850 08/25/17 0900   BP: 113/67 140/87 113/85   Resp: 16 12    Temp: 36.6  C (97.9  F) 36.4  C (97.6  F)    SpO2: 99% 100%          Electronically Signed By: Jonathan Anderson MD  August 25, 2017  9:15 AM

## 2017-08-25 NOTE — ANESTHESIA CARE TRANSFER NOTE
Patient: Minerva Blanco    Procedure(s):  Esophagogastroduodenoscopy,  Stent Revision,  Cauterization - Wound Class: II-Clean Contaminated   - Wound Class: II-Clean Contaminated    Diagnosis: Esophageal Perforation   Diagnosis Additional Information: No value filed.    Anesthesia Type:   General, ETT, RSI     Note:  Airway :Face Mask  Patient transferred to:PACU  Comments: Anesthesia Care Transfer Note    Patient: Minerva Blanco    Transferred to: PACU    Patient vital signs: stable    Airway: none    Monitors on, VSS, pt. Stable, Report given to PACU RN.     Narendra Zaldivar CRNA  8/25/2017 8:56 AM            Vitals: (Last set prior to Anesthesia Care Transfer)    CRNA VITALS  8/25/2017 0818 - 8/25/2017 0856      8/25/2017             Resp Rate (set): 10                Electronically Signed By: CLIVE Roche CRNA  August 25, 2017  8:56 AM

## 2017-08-25 NOTE — IP AVS SNAPSHOT
Post Anesthesia Care Unit [address] [address]    Phone:  [phone]                                       After Visit Summary   8/25/2017    Minerva Blanco    MRN: [id number]           After Visit Summary Signature Page     I have received my discharge instructions, and my questions have been answered. I have discussed any challenges I see with this plan with the nurse or doctor.    ..........................................................................................................................................  Patient/Patient Representative Signature      ..........................................................................................................................................  Patient Representative Print Name and Relationship to Patient    ..................................................               ................................................  Date                                            Time    ..........................................................................................................................................  Reviewed by Signature/Title    ...................................................              ..............................................  Date                                                            Time

## 2017-08-25 NOTE — ANESTHESIA PREPROCEDURE EVALUATION
Anesthesia Evaluation     . Pt has had prior anesthetic.     No history of anesthetic complications          ROS/MED HX    ENT/Pulmonary:     (+)tobacco use (Quit 1998), Past use , . .    Neurologic:     (+)Multiple Sclerosis other neuro Meniere's   (-) seizures and CVA   Cardiovascular:     (+) ----. : . . . :. dysrhythmias (One event with surgery a year ago. Not currently on any medications and in NSR.) a-fib, . pulmonary hypertension (Mild), Previous cardiac testing Echodate:results:date: results:ECG reviewed date: results: date: results:         (-) hypertension   METS/Exercise Tolerance:  >4 METS   Hematologic:  - neg hematologic  ROS       Musculoskeletal: Comment: Fibromyalgia        GI/Hepatic: Comment: Hiatal hernia s/p surgery with complication of leak and s/p multiple washouts and EGDs/stent. Patient is still NPO and gets nutrition through feeding tube    (+) GERD hiatal hernia,       Renal/Genitourinary:  - ROS Renal section negative       Endo:  - neg endo ROS       Psychiatric:         Infectious Disease:  - neg infectious disease ROS       Malignancy:   (+) Malignancy History of Breast  Breast CA Remission status post.         Other:    (+) No chance of pregnancy C-spine cleared: N/A, H/O Chronic Pain,H/O chronic opiod use , no other significant disability                  Procedure: Procedure(s):  Esophagogastroduodenoscopy, Possible Stent Revision, Possible Cauterization - Wound Class: II-Clean Contaminated   - Wound Class: II-Clean Contaminated    HPI: Minerva Blanco is a 72 year old female with extensive medical history related to complication of hiatal hernia repair s/p multiple washouts, EGDs with no anesthetic complications and scheduled for above procedure.    PMHx/PSHx:  Past Medical History:   Diagnosis Date     Arrhythmia     one time event; no longer on meds     Atrial fibrillation (H)      Breast cancer (H) 2007    right side - lumpectomy, chemo, and local radiation     Chronic  infection     infection/wound for two years after esoph surgery     Esophageal perforation      Fibromyalgia      GERD (gastroesophageal reflux disease)      Hiatal hernia      History of blood transfusion      IBS (irritable bowel syndrome)      Meniere's disease     deaf left ear     MS (multiple sclerosis) (H)      Multiple sclerosis (H)      Noninfectious ileitis      Other chronic pain        Past Surgical History:   Procedure Laterality Date     APPENDECTOMY       BACK SURGERY  5/1/2015    lumbar fusion     BRONCHOSCOPY FLEXIBLE N/A 4/17/2017    Procedure: BRONCHOSCOPY FLEXIBLE;;  Surgeon: Jens Wise MD;  [facility]     C GASTROSTOMY/JEJUN TUBE      J tube for feedings     CLOSE SPIT FISTULA N/A 4/17/2017    Procedure: CLOSE SPIT FISTULA;;  Surgeon: Jens Wise MD;  [facility]     COMBINED ESOPHAGOSCOPY, GASTROSCOPY, DUODENOSCOPY (EGD), REMOVE ESOPHAGEAL STENT N/A 8/26/2016    Procedure: COMBINED ESOPHAGOSCOPY, GASTROSCOPY, DUODENOSCOPY (EGD), REMOVE ESOPHAGEAL STENT;  Surgeon: Jens Wise MD;  [facility]     CREATE SPIT FISTULA N/A 1/9/2017    Procedure: CREATE SPIT FISTULA;  Surgeon: Jens Wise MD;  [facility]     ESOPHAGECTOMY N/A 1/9/2017    Procedure: ESOPHAGECTOMY;  Surgeon: Jens Wise MD;  [facility]     ESOPHAGOSCOPY, GASTROSCOPY, DUODENOSCOPY (EGD), COMBINED N/A 4/18/2016    Procedure: COMBINED ESOPHAGOSCOPY, GASTROSCOPY, DUODENOSCOPY (EGD);  Surgeon: Alexis Barraza MD;  [facility]     ESOPHAGOSCOPY, GASTROSCOPY, DUODENOSCOPY (EGD), COMBINED N/A 4/25/2016    Procedure: COMBINED ESOPHAGOSCOPY, GASTROSCOPY, DUODENOSCOPY (EGD);  Surgeon: Alexis Barraza MD;  [facility]     ESOPHAGOSCOPY, GASTROSCOPY, DUODENOSCOPY (EGD), COMBINED N/A 5/4/2016    Procedure: COMBINED ESOPHAGOSCOPY, GASTROSCOPY, DUODENOSCOPY (EGD);  Surgeon: Alexis Barraza MD;  [facility]      ESOPHAGOSCOPY, GASTROSCOPY, DUODENOSCOPY (EGD), COMBINED N/A 5/18/2016    Procedure: COMBINED ESOPHAGOSCOPY, GASTROSCOPY, DUODENOSCOPY (EGD);  Surgeon: Alexis Barraza MD;  [facility]     ESOPHAGOSCOPY, GASTROSCOPY, DUODENOSCOPY (EGD), COMBINED N/A 6/22/2016    Procedure: COMBINED ESOPHAGOSCOPY, GASTROSCOPY, DUODENOSCOPY (EGD);  Surgeon: Alexis Barraza MD;  [facility]     ESOPHAGOSCOPY, GASTROSCOPY, DUODENOSCOPY (EGD), COMBINED N/A 7/12/2016    Procedure: COMBINED ESOPHAGOSCOPY, GASTROSCOPY, DUODENOSCOPY (EGD);  Surgeon: Jens Wise MD;  [facility]     ESOPHAGOSCOPY, GASTROSCOPY, DUODENOSCOPY (EGD), COMBINED N/A 4/21/2017    Procedure: COMBINED ESOPHAGOSCOPY, GASTROSCOPY, DUODENOSCOPY (EGD);  Esophagogastroduodenoscopy, Pharyngostomy Tube Placement ;  Surgeon: Jens Wise MD;  [facility]     ESOPHAGOSCOPY, GASTROSCOPY, DUODENOSCOPY (EGD), COMBINED N/A 5/19/2017    Procedure: COMBINED ESOPHAGOSCOPY, GASTROSCOPY, DUODENOSCOPY (EGD);  Upper Endoscopy, Irrigation and Debridement of Chest Wound ;  Surgeon: Nalini Barreto MD;  [facility]     ESOPHAGOSCOPY, GASTROSCOPY, DUODENOSCOPY (EGD), COMBINED N/A 5/23/2017    Procedure: COMBINED ESOPHAGOSCOPY, GASTROSCOPY, DUODENOSCOPY (EGD);;  Surgeon: Nalini Barreto MD;  [facility]     ESOPHAGOSCOPY, GASTROSCOPY, DUODENOSCOPY (EGD), COMBINED N/A 6/27/2017    Procedure: COMBINED ESOPHAGOSCOPY, GASTROSCOPY, DUODENOSCOPY (EGD);  Esophagogastroduodenoscopy With Removal And Replacement Of Esophageal Stent exchange. Exchange of pharyngtomy tube. Chest wound dressing change;  Surgeon: Nalini Barreto MD;  [facility]     ESOPHAGOSCOPY, GASTROSCOPY, DUODENOSCOPY (EGD), COMBINED N/A 8/4/2017    Procedure: COMBINED ESOPHAGOSCOPY, GASTROSCOPY, DUODENOSCOPY (EGD);  Esophagogastroduodenoscopy, Stent Removal and Stent Replacement. Dressing Change ;  Surgeon: Nalini Barreto MD;  [facility]      ESOPHAGOSCOPY, GASTROSCOPY, DUODENOSCOPY (EGD), DILATATION, COMBINED N/A 6/29/2016    Procedure: COMBINED ESOPHAGOSCOPY, GASTROSCOPY, DUODENOSCOPY (EGD), DILATATION;  Surgeon: Jens Wise MD;  [facility]     EXPLORE NECK N/A 5/19/2017    Procedure: EXPLORE NECK;;  Surgeon: Nalini Barreto MD;  [facility]     HC UGI ENDOSCOPY W TRANSENDOSCOPIC STENT PLACEMENT N/A 7/12/2016    Procedure: COMBINED ESOPHAGOSCOPY, GASTROSCOPY, DUODENOSCOPY (EGD), PLACE TRANSENDOSCOPIC ESOPHAGEAL STENT;  Surgeon: Jens Wise MD;  [facility]     HC UGI ENDOSCOPY W TRANSENDOSCOPIC STENT PLACEMENT N/A 7/22/2016    Procedure: COMBINED ESOPHAGOSCOPY, GASTROSCOPY, DUODENOSCOPY (EGD), PLACE TRANSENDOSCOPIC ESOPHAGEAL STENT;  Surgeon: Jens Wise MD;  [facility]     INCISION AND DRAINAGE CHEST WASHOUT, COMBINED N/A 5/27/2017    Procedure: COMBINED INCISION AND DRAINAGE CHEST WASHOUT;  Chest washout;  Surgeon: Nalini Barreto MD;  [facility]     INCISION AND DRAINAGE CHEST WASHOUT, COMBINED N/A 5/28/2017    Procedure: COMBINED INCISION AND DRAINAGE CHEST WASHOUT;  Chest washout;  Surgeon: Nalini Barreto MD;  [facility]     INCISION AND DRAINAGE CHEST WASHOUT, COMBINED N/A 6/9/2017    Procedure: COMBINED INCISION AND DRAINAGE CHEST WASHOUT;  Chest washout and dressing change, Esophaogastroduodenoscopy;  Surgeon: Jens Wise MD;  [facility]     INCISION AND DRAINAGE CHEST WASHOUT, COMBINED N/A 7/17/2017    Procedure: COMBINED INCISION AND DRAINAGE CHEST WASHOUT;  Incision and Drainage of right Chest Wound, Esophagogastroduodenoscopy with stent exchange. pharangostomy tube revision;  Surgeon: Jens Wise MD;  [facility]     IRRIGATION AND DEBRIDEMENT CHEST WASHOUT, COMBINED N/A 6/29/2016    Procedure: COMBINED IRRIGATION AND DEBRIDEMENT CHEST WASHOUT;  Surgeon: Jens Wise MD;  [facility]     IRRIGATION AND DEBRIDEMENT  CHEST WASHOUT, COMBINED N/A 5/23/2017    Procedure: COMBINED IRRIGATION AND DEBRIDEMENT CHEST WASHOUT;  COMBINED IRRIGATION AND DEBRIDEMENT CHEST WASHOUT,COMBINED ESOPHAGOSCOPY, GASTROSCOPY, DUODENOSCOPY (EGD) ;  Surgeon: Nalini Barreto MD;  [facility]     IRRIGATION AND DEBRIDEMENT CHEST WASHOUT, COMBINED N/A 5/24/2017    Procedure: COMBINED IRRIGATION AND DEBRIDEMENT CHEST WASHOUT;  Chest wound Washout and Dressing Change;  Surgeon: Nalini Barreto MD;  [facility]     IRRIGATION AND DEBRIDEMENT CHEST WASHOUT, COMBINED N/A 5/25/2017    Procedure: COMBINED IRRIGATION AND DEBRIDEMENT CHEST WASHOUT;  Irrigation and Debridement Chest Washout and dressing change;  Surgeon: Nalini Barreto MD;  [facility]     IRRIGATION AND DEBRIDEMENT CHEST WASHOUT, COMBINED N/A 5/26/2017    Procedure: COMBINED IRRIGATION AND DEBRIDEMENT CHEST WASHOUT;  Irrigation and Debridement Chest Washout with  dressing change;  Surgeon: Nalini Barreto MD;  [facility]     IRRIGATION AND DEBRIDEMENT CHEST WASHOUT, COMBINED N/A 5/30/2017    Procedure: COMBINED IRRIGATION AND DEBRIDEMENT CHEST WASHOUT;  Irrigation and Debridement Chest Washout , PICC line dressing change;  Surgeon: Nalini Barreto MD;  [facility]     IRRIGATION AND DEBRIDEMENT CHEST WASHOUT, COMBINED N/A 5/31/2017    Procedure: COMBINED IRRIGATION AND DEBRIDEMENT CHEST WASHOUT;  Irrigation and Debridement Chest Washout ;  Surgeon: Nalini Barreto MD;  [facility]     IRRIGATION AND DEBRIDEMENT CHEST WASHOUT, COMBINED N/A 6/1/2017    Procedure: COMBINED IRRIGATION AND DEBRIDEMENT CHEST WASHOUT;  Chest Wound Irrigation And Debridement with dressing change ;  Surgeon: Nalini Barreto MD;  [facility]     IRRIGATION AND DEBRIDEMENT CHEST WASHOUT, COMBINED N/A 6/4/2017    Procedure: COMBINED IRRIGATION AND DEBRIDEMENT CHEST WASHOUT;  COMBINED IRRIGATION AND DEBRIDEMENT CHEST WASHOUT, Esophagoscopy, Gastroscopy, Duodenoscopy (EGD) place transendoscopic  esophageal stent,   Pharyngostomy and chest wall tube exchange  C-Arm;  Surgeon: Jens Wise MD;  [facility]     IRRIGATION AND DEBRIDEMENT CHEST WASHOUT, COMBINED N/A 6/2/2017    Procedure: COMBINED IRRIGATION AND DEBRIDEMENT CHEST WASHOUT;  Chest Wound Irrigation and Debridement, Dressing Change;  Surgeon: Nalini Barreto MD;  [facility]     LAPAROSCOPIC ASSISTED INSERTION TUBE JEJUNOSTOMY N/A 4/17/2017    Procedure: LAPAROSCOPIC ASSISTED INSERTION TUBE JEJUNOSTOMY;;  Surgeon: Jens Wise MD;  [facility]     LAPAROSCOPY DIAGNOSTIC (GENERAL) N/A 1/9/2017    Procedure: LAPAROSCOPY DIAGNOSTIC (GENERAL);  Surgeon: Jens Wise MD;  [facility]     LAPAROSCOPY DIAGNOSTIC (GENERAL) N/A 4/17/2017    Procedure: LAPAROSCOPY DIAGNOSTIC (GENERAL);  Laparoscopic , Neck Dissection, Spit Fistula Takedown, Laparoscopic Jejunostomy Tube and Pharyngostomy Tube, Gastric Pull up, Upper Endoscopy(EGD)  , Flexible Bronchoscopy ,Sternotomy ;  Surgeon: Jens Wise MD;  [facility]     LUMPECTOMY BREAST Right 2007     NERVE BLOCK PERIPHERAL N/A 8/30/2016    Procedure: NERVE BLOCK PERIPHERAL;  Surgeon: GENERIC ANESTHESIA PROVIDER;  [facility]     NISSEN FUNDOPLICATION  1/2016     PHARYNGOSTOMY N/A 4/18/2016    Procedure: PHARYNGOSTOMY;  Surgeon: Alexis Barraza MD;  [facility]     PHARYNGOSTOMY N/A 4/25/2016    Procedure: PHARYNGOSTOMY;  Surgeon: Alexis Barraza MD;  [facility]     PHARYNGOSTOMY N/A 5/4/2016    Procedure: PHARYNGOSTOMY;  Surgeon: Alexis Barraza MD;  [facility]     PHARYNGOSTOMY N/A 6/22/2016    Procedure: PHARYNGOSTOMY;  Surgeon: Alexis Barraza MD;  [facility]     PHARYNGOSTOMY N/A 6/29/2016    Procedure: PHARYNGOSTOMY;  Surgeon: Jens Wise MD;  [facility]     PHARYNGOSTOMY N/A 4/21/2017    Procedure: PHARYNGOSTOMY;;  Surgeon: Jens Wise MD;  " [facility]     PHARYNGOSTOMY Left 5/31/2017    Procedure: PHARYNGOSTOMY;;  Surgeon: Nalini Barreto MD;  [facility]     PICC INSERTION Left 8/25/2016    5fr DL BioFlo PICC, 42cm (3cm external) in the L medial brachial vein w/ tip in the SVC RA junction.     PICC INSERTION - Rewire Right 05/31/2017    5fr DL BioFlo PICC, 40cm (6cm external) in the R medial brachial vein w/ tip in the SVC RA junction.     THORACOTOMY Right 1998    lung infection - \"hard crust formed on lung\"     THORACOTOMY Left 1/9/2017    Procedure: THORACOTOMY;  Surgeon: Jens Wise MD;  [facility]     WRIST SURGERY           No current facility-administered medications on file prior to encounter.   Current Outpatient Prescriptions on File Prior to Encounter:  oxyCODONE (ROXICODONE) 5 MG IR tablet Take 1 tablet (5 mg) by mouth every 6 hours as needed for pain or other (Moderate to Severe)   acetaminophen (TYLENOL) 32 mg/mL solution 30.45 mLs (975 mg) by Per Feeding Tube route 3 times daily   diphenoxylate-atropine (LOMOTIL) 2.5-0.025 MG per tablet Take 1 tablet by mouth 4 times daily as needed for diarrhea   pantoprazole (PROTONIX) 40 MG EC tablet Take by mouth 30-60 minutes before a meal.   ferrous sulfate 300 (60 FE) MG/5ML syrup 5 mLs (300 mg) by Per J Tube route daily   fiber modular (NUTRISOURCE FIBER) packet 1 packet by Per Feeding Tube route 3 times daily (with meals)   traZODone (DESYREL) 100 MG tablet 0.5 tablets (50 mg) by Per G Tube route At Bedtime (Patient taking differently: 100 mg by Per G Tube route At Bedtime )   DiphenhydrAMINE HCl (BENADRYL PO) Take 25 mg by mouth nightly as needed   cholestyramine (QUESTRAN) 4 G Packet Take 1 packet by mouth daily   multivitamins with minerals (CERTAVITE/CEROVITE) LIQD liquid Take 15 mLs by mouth daily   loperamide (IMODIUM) 1 MG/5ML liquid Take 20 mLs (4 mg) by mouth 4 times daily as needed for diarrhea (Patient taking differently: Take 4 mg by mouth 2 times daily " )   Lactobacillus Rhamnosus, GG, (CULTURELLE PO) Take 1 tablet by mouth 2 times daily    diphenoxylate-atropine (LOMOTIL) 2.5-0.025 MG/5ML liquid Take 5 mLs by mouth 4 times daily as needed for diarrhea   clotrimazole (LOTRIMIN) 1 % cream Apply topically 2 times daily   chlorhexidine (PERIDEX) 0.12 % solution Swish and spit 15 mLs in mouth 2 times daily   gabapentin (NEURONTIN) 250 MG/5ML solution Take 6 mLs (300 mg) by mouth every 8 hours   chlorhexidine (HIBICLENS) 4 % liquid Apply topically 2 times daily   order for DME Equipment being [place] [facility] Suction Machine-IntermittentSuction Canisters(2)Suction Canister Holders(2)Suction Connect Tube(2)5 in 1 Connector(2)Bacteria Filter(2)[employer]auer Suction(2)Treatment Diagnosis: Esophogeal Perforation, s/p esophagectomy   order for DME Equipment being ordered: Suction PumpSuction Canister(2)Suction Tubing(2)Bacteria Filter(2)[employer]auer(4)Red Rubber Catheter(2)Treatment Diagnosis: Esophogeal Perforation, s/p esophagectomy       Social Hx:   Social History   Substance Use Topics     Smoking status: Former Smoker     Packs/day: 1.00     Years: 15.00     Types: Cigarettes     Quit date: 1/1/1998     Smokeless tobacco: Never Used     Alcohol use No       Allergies:   Allergies   Allergen Reactions     Amoxicillin Diarrhea     Ativan [Lorazepam] Other (See Comments)     Hallucinations     Hydromorphone Itching     4/12/17 - patient open to using this as she tolerated Hydromorphone PCA during hospitalization in January 2017.      Morphine Itching         NPO Status: Per ASA Guidelines    Labs:    Blood Bank:  Lab Results   Component Value Date    ABO A 07/17/2017    RH  Neg 07/17/2017    AS Neg 07/17/2017     BMP:  Recent Labs   Lab Test  07/07/17 0715   NA  140   POTASSIUM  3.9   CHLORIDE  104   CO2  29   BUN  18   CR  0.40*   GLC  96   ANNABELLE  8.1*     CBC:   Recent Labs   Lab Test  07/17/17   0854  07/07/17   0715   WBC   --   5.8   RBC   --   3.28*   HGB  10.5*  9.4*   HCT    --   31.1*   MCV   --   95   MCH   --   28.7   MCHC   --   30.2*   RDW   --   15.2*   PLT   --   343     Coags:  Recent Labs   Lab Test  07/03/17   0714   06/04/17   0728   05/19/17   1650   INR  1.09   < >  1.07   < >  1.87*   PTT   --    --   44*   < >  54*   FIBR   --    --    --    --   497*    < > = values in this interval not displayed.         Physical Exam  Normal systems: dental    Airway   Mallampati: II  TM distance: >3 FB  Neck ROM: full    Dental     Cardiovascular   Rhythm and rate: regular and normal      Pulmonary    breath sounds clear to auscultation                    Anesthesia Plan      History & Physical Review  History and physical reviewed and following examination; no interval change.    ASA Status:  3 .    NPO Status:  > 6 hours    Plan for General, ETT and RSI with Intravenous and Propofol induction. Maintenance will be Balanced.    PONV prophylaxis:  Ondansetron (or other 5HT-3) and Dexamethasone or Solumedrol       Postoperative Care  Postoperative pain management:  IV analgesics.      Consents  Anesthetic plan, risks, benefits and alternatives discussed with:  Patient..                History and physical assessed; Patient examined.   Risks and alternatives presented and discussed. Patient and family agree. All questions answered.      Jonathan Anderson MD  Staff Anesthesiologist  *[phone]

## 2017-08-25 NOTE — BRIEF OP NOTE
[facility], [place]    Brief Operative Note    Pre-operative diagnosis: Esophageal Perforation   Post-operative diagnosis * No post-op diagnosis entered *  Procedure: Procedure(s):  Esophagogastroduodenoscopy,  Stent Revision,  Cauterization - Wound Class: II-Clean Contaminated   - Wound Class: II-Clean Contaminated  Surgeon: Surgeon(s) and Role:     * Jens Wise MD - Primary     * Don Scott MD - Assisting  Anesthesia: General   Estimated blood loss: 2 cc  Drains: None  Specimens: * No specimens in log *  Findings:   Stent removed and fistula visualized and cauterized.  [id number] stent replaced..  Complications: None.  Implants: [id number] esophageal stent    Petros Johnson  General Surgery PGY-3  Pager [phone]

## 2017-08-25 NOTE — DISCHARGE INSTRUCTIONS
[facility]  Same-Day Surgery   Adult Discharge Orders & Instructions     For 24 hours after surgery    1. Get plenty of rest.  A responsible adult must stay with you for at least 24 hours after you leave the hospital.   2. Do not drive or use heavy equipment.  If you have weakness or tingling, don't drive or use heavy equipment until this feeling goes away.  3. Do not drink alcohol.  4. Avoid strenuous or risky activities.  Ask for help when climbing stairs.   5. You may feel lightheaded.  IF so, sit for a few minutes before standing.  Have someone help you get up.   6. If you have nausea (feel sick to your stomach): Drink only clear liquids such as apple juice, ginger ale, broth or 7-Up.  Rest may also help.  Be sure to drink enough fluids.  Move to a regular diet as you feel able.  7. You may have a slight fever. Call the doctor if your fever is over 100 F (37.7 C) (taken under the tongue) or lasts longer than 24 hours.  8. You may have a dry mouth, a sore throat, muscle aches or trouble sleeping.  These should go away after 24 hours.  9. Do not make important or legal decisions.   Call your doctor for any of the followin.  Signs of infection (fever, growing tenderness at the surgery site, a large amount of drainage or bleeding, severe pain, foul-smelling drainage, redness, swelling).    2. It has been over 8 to 10 hours since surgery and you are still not able to urinate (pass water).    3.  Headache for over 24 hours.    4.  Numbness, tingling or weakness the day after surgery (if you had spinal anesthesia).  To contact a doctor, call ___Dr OLIMPIA Wise's office @ [phone]  ____ or:    X   [phone] and ask for the resident on call for   _____General Surgery _ (answered 24 hours a day)  X    Emergency Department:    [facility] [place]: [phone]           Tips for taking pain medications  To get the best pain relief possible , remember these points:      Take  pain medications as directed, before pain becomes severe      Pain medication can upset your stomach: taking it with food may help      Constipation is a common side effect of pain medication. Drink plenty of  Fluids      Eat foods high in fiber. Take a stool softener  if recommended by your doctor or  Pharmacist.        Do not drink alcohol, drive or operate machinery while taking pain medications.      Ask about other ways to control pain, such as with heat, ice or relaxation.    Upper GI Endoscopy     During endoscopy, a long, flexible tube is used to view the inside of your upper GI tract.      Upper GI endoscopy allows your healthcare provider to look directly into the beginning of your gastrointestinal (GI) tract. The esophagus, stomach, and duodenum (the first part of the small intestine) make up the upper GI tract.   Before the exam  Follow these and any other instructions you are given before your endoscopy. If you don t follow the healthcare provider s instructions carefully, the test may need to be canceled or done over:    Don't eat or drink anything after midnight the night before your exam. If your exam is in the afternoon, drink only clear liquids in the morning. Don't eat or drink anything for 8 hours before the exam. In some cases, you may be able to take medicines with sips of water until 2 hours before the procedure. Speak with your healthcare provider about this.     Bring your X-rays and any other test results you have.    Because you will be sedated, arrange for an adult to drive you home after the exam.    Tell your healthcare provider before the exam if you are taking any medicines or have any medical problems.  The procedure  Here is what to expect:    You will lie on the endoscopy table. Usually patients lie on the left side.    You will be monitored and given oxygen.    Your throat may be numbed with a spray or gargle. You are given medicine through an intravenous (IV) line that will help  you relax and remain comfortable. You may be awake or asleep during the procedure.    The healthcare provider will put the endoscope in your mouth and down your esophagus. It is thinner than most pieces of food that you swallow. It will not affect your breathing. The medicine helps keep you from gagging.    Air is put into your GI tract to expand it. It can make you burp.    During the procedure, the healthcare provider can take biopsies (tissue samples), remove abnormalities, such as polyps, or treat abnormalities through a variety of devices placed through the endoscope. You will not feel this.     The endoscope carries images of your upper GI tract to a video screen. If you are awake, you may be able to look at the images.    After the procedure is done, you will rest for a time. An adult must drive you home.  When to call your healthcare provider  Contact your healthcare provider if you have:    Black or tarry stools, or blood in your stool    Fever    Pain in your belly that does not go away    Nausea and vomiting, or vomiting blood   Date Last Reviewed: 7/1/2016 2000-2017 [employer]. [address] [place] [address], [place] [address]. All rights reserved. This information is not intended as a substitute for professional medical care. Always follow your healthcare professional's instructions.

## 2017-08-26 NOTE — OP NOTE
DATE OF PROCEDURE:  2017      PREOPERATIVE DIAGNOSIS:  Post-esophagectomy leak.      PROCEDURES PERFORMED:   1.  Esophagogastroscopy with removal of stent and replacement of stent (19 x 120 fully covered stent).   2.  Endoscopic cauterization of tracheoesophageal fistula.   3.  Cauterization chest wound.   4.  Fluoroscopy with interpretation.        SURGEON:  Nirali Lopez MD (present for the entire procedure).      RESIDENT SURGEON:  Neri Johnson MD      ANESTHESIA:  General.      ESTIMATED BLOOD LOSS:  3 mL.      COMPLICATIONS:  None immediate.      FINDINGS:  There was still a persistent small fistula between the anastomosis and the wound.      DESCRIPTION OF PROCEDURE:  With Favian Malone in supine position under general anesthesia, I performed an endoscopy and removed the previous stent with a rat-tooth forceps.  Following this, we examined the area and identified the area of the leak and using the gold probe cauterized the fistula from the internal aspect.  Following this, we also cauterized the granulation tissue in the anterior chest wound.  Once we completed this, we passed a wire and then under fluoroscopic guidance deployed a 19 x 120 fully covered stent.  We chose the 120 length so that the end of the stent would be in a different location than the previous one and prevent damage to the gastric conduit.      The patient tolerated the procedure well.         NIRALI LOPEZ MD             D: 2017 15:36   T: 2017 16:35   MT: [id number]      Name:     FAVIAN MALONE   MRN:      [id number]        Account:        [id number]   :      1945           Procedure Date: 2017      Document: [id number]       cc: [facility] Billing

## 2017-08-28 ENCOUNTER — OFFICE VISIT (OUTPATIENT)
Dept: ANESTHESIOLOGY | Facility: CLINIC | Age: 72
End: 2017-08-28

## 2017-08-28 VITALS
SYSTOLIC BLOOD PRESSURE: 133 MMHG | WEIGHT: 89.3 LBS | DIASTOLIC BLOOD PRESSURE: 78 MMHG | HEIGHT: 60 IN | BODY MASS INDEX: 17.53 KG/M2 | HEART RATE: 82 BPM

## 2017-08-28 DIAGNOSIS — F11.20 CONTINUOUS OPIOID DEPENDENCE (H): ICD-10-CM

## 2017-08-28 DIAGNOSIS — M79.2 NEUROPATHIC PAIN: Primary | ICD-10-CM

## 2017-08-28 DIAGNOSIS — K22.2 ESOPHAGEAL STRICTURE: ICD-10-CM

## 2017-08-28 DIAGNOSIS — F11.93 OPIOID WITHDRAWAL (H): ICD-10-CM

## 2017-08-28 ASSESSMENT — ENCOUNTER SYMPTOMS
NERVOUS/ANXIOUS: 0
ALTERED TEMPERATURE REGULATION: 1
PANIC: 0
POLYDIPSIA: 0
INCREASED ENERGY: 1
DEPRESSION: 0
MUSCLE WEAKNESS: 1
FEVER: 0
STIFFNESS: 0
ARTHRALGIAS: 0
BACK PAIN: 1
MUSCLE CRAMPS: 1
SKIN CHANGES: 0
WEIGHT GAIN: 0
DECREASED CONCENTRATION: 0
WEIGHT LOSS: 1
CHILLS: 1
NECK PAIN: 0
DECREASED APPETITE: 0
JOINT SWELLING: 0
NIGHT SWEATS: 1
INSOMNIA: 1
POOR WOUND HEALING: 0
NAIL CHANGES: 0
HALLUCINATIONS: 0
FATIGUE: 1
MYALGIAS: 1
POLYPHAGIA: 1

## 2017-08-28 ASSESSMENT — ANXIETY QUESTIONNAIRES
7. FEELING AFRAID AS IF SOMETHING AWFUL MIGHT HAPPEN: NOT AT ALL
GAD7 TOTAL SCORE: 2
GAD7 TOTAL SCORE: 2
5. BEING SO RESTLESS THAT IT IS HARD TO SIT STILL: NOT AT ALL
1. FEELING NERVOUS, ANXIOUS, OR ON EDGE: SEVERAL DAYS
4. TROUBLE RELAXING: NOT AT ALL
6. BECOMING EASILY ANNOYED OR IRRITABLE: SEVERAL DAYS
GAD7 TOTAL SCORE: 2
7. FEELING AFRAID AS IF SOMETHING AWFUL MIGHT HAPPEN: NOT AT ALL
2. NOT BEING ABLE TO STOP OR CONTROL WORRYING: NOT AT ALL
3. WORRYING TOO MUCH ABOUT DIFFERENT THINGS: NOT AT ALL

## 2017-08-28 ASSESSMENT — PAIN SCALES - GENERAL: PAINLEVEL: SEVERE PAIN (6)

## 2017-08-28 NOTE — NURSING NOTE
AVS given and reviewed with pt.  Pt verbalized understanding and declined any questions.     France Meza, RN, BSN

## 2017-08-28 NOTE — LETTER
8/28/2017       RE: Minerva Blanco  [address]   [facility] [address]     Dear Colleague,    Thank you for referring your patient, Minerva Blanco, to the [facility]NAGEMENT at [facility]. Please see a copy of my visit note below.    Subjective:  72 year old female with past medical history of atrial fibrillation, GERD, esophagectomy, pneumonia and mediastinitis presents for evaluation of chest wall pain and shoulder pain. Pain has been present for 2 years. Pain has gotten worse since that time.     She recently underwent Esophagogastroscopy with removal of stent and replacement of stent (19 x 120 fully covered stent), Endoscopic cauterization of tracheoesophageal fistula, Cauterization chest wound, Fluoroscopy with interpretation on 8/25/17.    In January 2016, she underwent initial hiatal hernia repair which was complicated by infection.    Since that time, she underwent multiple additional surgeries, some of which were complicated by infections requiring Incision and Debridement.    She currently has a J-tube in place, along with an open right chest wall wound which she is currently managing with twice daily dressing changes.    Current treatments include:  Oxycodone 5mg TID, Tylenol    Previous medication treatments included:  Anti-convulsants: Neurontin 300mg TID,   Muscle relaxors: None  Anti-depressants: Trazodone 100mg  NSAIDs: None  Acetaminophen: None  Opioids: Oxycodone 15mg Q4H,     Past Medical History:   Diagnosis Date     Arrhythmia     one time event; no longer on meds     Atrial fibrillation (H)      Breast cancer (H) 2007    right side - lumpectomy, chemo, and local radiation     Chronic infection     infection/wound for two years after esoph surgery     Esophageal perforation      Fibromyalgia      GERD (gastroesophageal reflux disease)      Hiatal hernia      History of blood transfusion      IBS (irritable  bowel syndrome)      Meniere's disease     deaf left ear     MS (multiple sclerosis) (H)      Multiple sclerosis (H)      Noninfectious ileitis      Other chronic pain     past medical history reviewed with patient.   Past Surgical History:   Procedure Laterality Date     APPENDECTOMY       BACK SURGERY  5/1/2015    lumbar fusion     BRONCHOSCOPY FLEXIBLE N/A 4/17/2017    Procedure: BRONCHOSCOPY FLEXIBLE;;  Surgeon: Jens Wise MD;  [facility]     C GASTROSTOMY/JEJUN TUBE      J tube for feedings     CLOSE SPIT FISTULA N/A 4/17/2017    Procedure: CLOSE SPIT FISTULA;;  Surgeon: Jens Wise MD;  [facility]     COMBINED ESOPHAGOSCOPY, GASTROSCOPY, DUODENOSCOPY (EGD), REMOVE ESOPHAGEAL STENT N/A 8/26/2016    Procedure: COMBINED ESOPHAGOSCOPY, GASTROSCOPY, DUODENOSCOPY (EGD), REMOVE ESOPHAGEAL STENT;  Surgeon: Jens Wise MD;  [facility]     COMBINED ESOPHAGOSCOPY, GASTROSCOPY, DUODENOSCOPY (EGD), REPLACE ESOPHAGEAL STENT N/A 8/25/2017    Procedure: COMBINED ESOPHAGOSCOPY, GASTROSCOPY, DUODENOSCOPY (EGD), REPLACE ESOPHAGEAL STENT;;  Surgeon: Jens Wise MD;  [facility]     CREATE SPIT FISTULA N/A 1/9/2017    Procedure: CREATE SPIT FISTULA;  Surgeon: Jens Wise MD;  [facility]     ESOPHAGECTOMY N/A 1/9/2017    Procedure: ESOPHAGECTOMY;  Surgeon: Jens iWse MD;  [facility]     ESOPHAGOSCOPY, GASTROSCOPY, DUODENOSCOPY (EGD), COMBINED N/A 4/18/2016    Procedure: COMBINED ESOPHAGOSCOPY, GASTROSCOPY, DUODENOSCOPY (EGD);  Surgeon: Alexis Barraza MD;  [facility]     ESOPHAGOSCOPY, GASTROSCOPY, DUODENOSCOPY (EGD), COMBINED N/A 4/25/2016    Procedure: COMBINED ESOPHAGOSCOPY, GASTROSCOPY, DUODENOSCOPY (EGD);  Surgeon: Alexis Barraza MD;  [facility]     ESOPHAGOSCOPY, GASTROSCOPY, DUODENOSCOPY (EGD), COMBINED N/A 5/4/2016    Procedure: COMBINED ESOPHAGOSCOPY, GASTROSCOPY, DUODENOSCOPY  (EGD);  Surgeon: Alexis Barraza MD;  [facility]     ESOPHAGOSCOPY, GASTROSCOPY, DUODENOSCOPY (EGD), COMBINED N/A 5/18/2016    Procedure: COMBINED ESOPHAGOSCOPY, GASTROSCOPY, DUODENOSCOPY (EGD);  Surgeon: Alexis Barraza MD;  [facility]     ESOPHAGOSCOPY, GASTROSCOPY, DUODENOSCOPY (EGD), COMBINED N/A 6/22/2016    Procedure: COMBINED ESOPHAGOSCOPY, GASTROSCOPY, DUODENOSCOPY (EGD);  Surgeon: Alexis Barraza MD;  [facility]     ESOPHAGOSCOPY, GASTROSCOPY, DUODENOSCOPY (EGD), COMBINED N/A 7/12/2016    Procedure: COMBINED ESOPHAGOSCOPY, GASTROSCOPY, DUODENOSCOPY (EGD);  Surgeon: Jens Wise MD;  [facility]     ESOPHAGOSCOPY, GASTROSCOPY, DUODENOSCOPY (EGD), COMBINED N/A 4/21/2017    Procedure: COMBINED ESOPHAGOSCOPY, GASTROSCOPY, DUODENOSCOPY (EGD);  Esophagogastroduodenoscopy, Pharyngostomy Tube Placement ;  Surgeon: Jens Wise MD;  [facility]     ESOPHAGOSCOPY, GASTROSCOPY, DUODENOSCOPY (EGD), COMBINED N/A 5/19/2017    Procedure: COMBINED ESOPHAGOSCOPY, GASTROSCOPY, DUODENOSCOPY (EGD);  Upper Endoscopy, Irrigation and Debridement of Chest Wound ;  Surgeon: Nalini Barreto MD;  [facility]     ESOPHAGOSCOPY, GASTROSCOPY, DUODENOSCOPY (EGD), COMBINED N/A 5/23/2017    Procedure: COMBINED ESOPHAGOSCOPY, GASTROSCOPY, DUODENOSCOPY (EGD);;  Surgeon: Nalini Barreto MD;  [facility]     ESOPHAGOSCOPY, GASTROSCOPY, DUODENOSCOPY (EGD), COMBINED N/A 6/27/2017    Procedure: COMBINED ESOPHAGOSCOPY, GASTROSCOPY, DUODENOSCOPY (EGD);  Esophagogastroduodenoscopy With Removal And Replacement Of Esophageal Stent exchange. Exchange of pharyngtomy tube. Chest wound dressing change;  Surgeon: Nalini Barreto MD;  [facility]     ESOPHAGOSCOPY, GASTROSCOPY, DUODENOSCOPY (EGD), COMBINED N/A 8/4/2017    Procedure: COMBINED ESOPHAGOSCOPY, GASTROSCOPY, DUODENOSCOPY (EGD);  Esophagogastroduodenoscopy, Stent Removal and Stent Replacement. Dressing  Change ;  Surgeon: Nalini Barreto MD;  [facility]     ESOPHAGOSCOPY, GASTROSCOPY, DUODENOSCOPY (EGD), COMBINED N/A 8/25/2017    Procedure: COMBINED ESOPHAGOSCOPY, GASTROSCOPY, DUODENOSCOPY (EGD);  Esophagogastroduodenoscopy,  Stent Revision,  Cauterization;  Surgeon: Jens Wise MD;  [facility]     ESOPHAGOSCOPY, GASTROSCOPY, DUODENOSCOPY (EGD), DILATATION, COMBINED N/A 6/29/2016    Procedure: COMBINED ESOPHAGOSCOPY, GASTROSCOPY, DUODENOSCOPY (EGD), DILATATION;  Surgeon: Jens Wise MD;  [facility]     EXPLORE NECK N/A 5/19/2017    Procedure: EXPLORE NECK;;  Surgeon: Nalini Barreto MD;  [facility]     HC UGI ENDOSCOPY W TRANSENDOSCOPIC STENT PLACEMENT N/A 7/12/2016    Procedure: COMBINED ESOPHAGOSCOPY, GASTROSCOPY, DUODENOSCOPY (EGD), PLACE TRANSENDOSCOPIC ESOPHAGEAL STENT;  Surgeon: Jens Wise MD;  [facility]     HC UGI ENDOSCOPY W TRANSENDOSCOPIC STENT PLACEMENT N/A 7/22/2016    Procedure: COMBINED ESOPHAGOSCOPY, GASTROSCOPY, DUODENOSCOPY (EGD), PLACE TRANSENDOSCOPIC ESOPHAGEAL STENT;  Surgeon: Jens Wise MD;  [facility]     INCISION AND DRAINAGE CHEST WASHOUT, COMBINED N/A 5/27/2017    Procedure: COMBINED INCISION AND DRAINAGE CHEST WASHOUT;  Chest washout;  Surgeon: Nalini Barreto MD;  [facility]     INCISION AND DRAINAGE CHEST WASHOUT, COMBINED N/A 5/28/2017    Procedure: COMBINED INCISION AND DRAINAGE CHEST WASHOUT;  Chest washout;  Surgeon: Nalini Barreto MD;  [facility]     INCISION AND DRAINAGE CHEST WASHOUT, COMBINED N/A 6/9/2017    Procedure: COMBINED INCISION AND DRAINAGE CHEST WASHOUT;  Chest washout and dressing change, Esophaogastroduodenoscopy;  Surgeon: Jens Wise MD;  [facility]     INCISION AND DRAINAGE CHEST WASHOUT, COMBINED N/A 7/17/2017    Procedure: COMBINED INCISION AND DRAINAGE CHEST WASHOUT;  Incision and Drainage of right Chest Wound, Esophagogastroduodenoscopy with  stent exchange. pharangostomy tube revision;  Surgeon: Jens Wise MD;  [facility]     IRRIGATION AND DEBRIDEMENT CHEST WASHOUT, COMBINED N/A 6/29/2016    Procedure: COMBINED IRRIGATION AND DEBRIDEMENT CHEST WASHOUT;  Surgeon: Jens Wise MD;  [facility]     IRRIGATION AND DEBRIDEMENT CHEST WASHOUT, COMBINED N/A 5/23/2017    Procedure: COMBINED IRRIGATION AND DEBRIDEMENT CHEST WASHOUT;  COMBINED IRRIGATION AND DEBRIDEMENT CHEST WASHOUT,COMBINED ESOPHAGOSCOPY, GASTROSCOPY, DUODENOSCOPY (EGD) ;  Surgeon: Nalini Barreto MD;  [facility]     IRRIGATION AND DEBRIDEMENT CHEST WASHOUT, COMBINED N/A 5/24/2017    Procedure: COMBINED IRRIGATION AND DEBRIDEMENT CHEST WASHOUT;  Chest wound Washout and Dressing Change;  Surgeon: Nalini Barreto MD;  [facility]     IRRIGATION AND DEBRIDEMENT CHEST WASHOUT, COMBINED N/A 5/25/2017    Procedure: COMBINED IRRIGATION AND DEBRIDEMENT CHEST WASHOUT;  Irrigation and Debridement Chest Washout and dressing change;  Surgeon: Nalini Barreto MD;  [facility]     IRRIGATION AND DEBRIDEMENT CHEST WASHOUT, COMBINED N/A 5/26/2017    Procedure: COMBINED IRRIGATION AND DEBRIDEMENT CHEST WASHOUT;  Irrigation and Debridement Chest Washout with  dressing change;  Surgeon: Nalini Barreto MD;  [facility]     IRRIGATION AND DEBRIDEMENT CHEST WASHOUT, COMBINED N/A 5/30/2017    Procedure: COMBINED IRRIGATION AND DEBRIDEMENT CHEST WASHOUT;  Irrigation and Debridement Chest Washout , PICC line dressing change;  Surgeon: Nalini Barreto MD;  [facility]     IRRIGATION AND DEBRIDEMENT CHEST WASHOUT, COMBINED N/A 5/31/2017    Procedure: COMBINED IRRIGATION AND DEBRIDEMENT CHEST WASHOUT;  Irrigation and Debridement Chest Washout ;  Surgeon: Nalini Barreto MD;  [facility]     IRRIGATION AND DEBRIDEMENT CHEST WASHOUT, COMBINED N/A 6/1/2017    Procedure: COMBINED IRRIGATION AND DEBRIDEMENT CHEST WASHOUT;  Chest Wound Irrigation And Debridement  with dressing change ;  Surgeon: Nalini Barreto MD;  [facility]     IRRIGATION AND DEBRIDEMENT CHEST WASHOUT, COMBINED N/A 6/4/2017    Procedure: COMBINED IRRIGATION AND DEBRIDEMENT CHEST WASHOUT;  COMBINED IRRIGATION AND DEBRIDEMENT CHEST WASHOUT, Esophagoscopy, Gastroscopy, Duodenoscopy (EGD) place transendoscopic esophageal stent,   Pharyngostomy and chest wall tube exchange  C-Arm;  Surgeon: Jens Wise MD;  [facility]     IRRIGATION AND DEBRIDEMENT CHEST WASHOUT, COMBINED N/A 6/2/2017    Procedure: COMBINED IRRIGATION AND DEBRIDEMENT CHEST WASHOUT;  Chest Wound Irrigation and Debridement, Dressing Change;  Surgeon: Nalini Barreto MD;  [facility]     LAPAROSCOPIC ASSISTED INSERTION TUBE JEJUNOSTOMY N/A 4/17/2017    Procedure: LAPAROSCOPIC ASSISTED INSERTION TUBE JEJUNOSTOMY;;  Surgeon: Jens Wise MD;  [facility]     LAPAROSCOPY DIAGNOSTIC (GENERAL) N/A 1/9/2017    Procedure: LAPAROSCOPY DIAGNOSTIC (GENERAL);  Surgeon: Jens Wise MD;  [facility]     LAPAROSCOPY DIAGNOSTIC (GENERAL) N/A 4/17/2017    Procedure: LAPAROSCOPY DIAGNOSTIC (GENERAL);  Laparoscopic , Neck Dissection, Spit Fistula Takedown, Laparoscopic Jejunostomy Tube and Pharyngostomy Tube, Gastric Pull up, Upper Endoscopy(EGD)  , Flexible Bronchoscopy ,Sternotomy ;  Surgeon: Jens Wise MD;  [facility]     LUMPECTOMY BREAST Right 2007     NERVE BLOCK PERIPHERAL N/A 8/30/2016    Procedure: NERVE BLOCK PERIPHERAL;  Surgeon: GENERIC ANESTHESIA PROVIDER;  [facility]     NISSEN FUNDOPLICATION  1/2016     PHARYNGOSTOMY N/A 4/18/2016    Procedure: PHARYNGOSTOMY;  Surgeon: Alexis Barraza MD;  [facility]     PHARYNGOSTOMY N/A 4/25/2016    Procedure: PHARYNGOSTOMY;  Surgeon: Alexis Barraza MD;  [facility]     PHARYNGOSTOMY N/A 5/4/2016    Procedure: PHARYNGOSTOMY;  Surgeon: Alexis Barraza MD;  [facility]      "PHARYNGOSTOMY N/A 6/22/2016    Procedure: PHARYNGOSTOMY;  Surgeon: Alexis Barraza MD;  [facility]     PHARYNGOSTOMY N/A 6/29/2016    Procedure: PHARYNGOSTOMY;  Surgeon: Jens Wise MD;  [facility]     PHARYNGOSTOMY N/A 4/21/2017    Procedure: PHARYNGOSTOMY;;  Surgeon: Jens Wise MD;  [facility]     PHARYNGOSTOMY Left 5/31/2017    Procedure: PHARYNGOSTOMY;;  Surgeon: Nalini Barreto MD;  [facility]     PICC INSERTION Left 8/25/2016    5fr DL BioFlo PICC, 42cm (3cm external) in the L medial brachial vein w/ tip in the SVC RA junction.     PICC INSERTION - Rewire Right 05/31/2017    5fr DL BioFlo PICC, 40cm (6cm external) in the R medial brachial vein w/ tip in the SVC RA junction.     THORACOTOMY Right 1998    lung infection - \"hard crust formed on lung\"     THORACOTOMY Left 1/9/2017    Procedure: THORACOTOMY;  Surgeon: Jens Wise MD;  [facility]     WRIST SURGERY      past surgical history reviewed with patient.   Medications:  Current Outpatient Prescriptions   Medication     oxyCODONE (ROXICODONE) 5 MG IR tablet     MELATONIN PO     UNABLE TO FIND     acetaminophen (TYLENOL) 32 mg/mL solution     diphenoxylate-atropine (LOMOTIL) 2.5-0.025 MG per tablet     pantoprazole (PROTONIX) 40 MG EC tablet     diphenoxylate-atropine (LOMOTIL) 2.5-0.025 MG/5ML liquid     ferrous sulfate 300 (60 FE) MG/5ML syrup     clotrimazole (LOTRIMIN) 1 % cream     chlorhexidine (PERIDEX) 0.12 % solution     gabapentin (NEURONTIN) 250 MG/5ML solution     chlorhexidine (HIBICLENS) 4 % liquid     fiber modular (NUTRISOURCE FIBER) packet     traZODone (DESYREL) 100 MG tablet     order for DME     order for DME     DiphenhydrAMINE HCl (BENADRYL PO)     cholestyramine (QUESTRAN) 4 G Packet     multivitamins with minerals (CERTAVITE/CEROVITE) LIQD liquid     loperamide (IMODIUM) 1 MG/5ML liquid     Lactobacillus Rhamnosus, GG, (CULTURELLE PO)     No current " facility-administered medications for this visit.      [place] and [place] Prescription Monitoring Program reviewed    Allergies:  Allergies   Allergen Reactions     Amoxicillin Diarrhea     Ativan [Lorazepam] Other (See Comments)     Hallucinations     Hydromorphone Itching     4/12/17 - patient open to using this as she tolerated Hydromorphone PCA during hospitalization in January 2017.      Morphine Itching     Family History:  family history includes Alzheimer Disease in her mother; Breast Cancer in her daughter; CEREBROVASCULAR DISEASE in her father; Ovarian Cancer in her sister.  Social history:  Smoking: none. Alcohol: none. Street drugs: none.   Review Of Systems    14 point ROS negative except per HPI  Objective:   /78  Pulse 82  Ht 1.524 m (5')  Wt 40.5 kg (89 lb 4.8 oz)  BMI 17.44 kg/m2  Body mass index is 17.44 kg/(m^2).  General: In no apparent distress  Mental status: Normal affect, pleasant  Head: Atraumatic, normocephalic  Eyes: Extra-ocular movements grossly intact, no scleral icterus  Cardiovascular: Regular rate  Respiratory: No respiratory distress  Abdomen: soft, non-distended, J-tube in place  Msk: Bilateral hips, knees have normal range of motion. Pain with extension and rotation of lumbar spine  Neuro: AAOx3.   CN II-XII are grossly intact.   Chest Wall: right-sided healing surgical wound with granulation tissue present, no signs of infection or erythema  Lymph: no supraclavicular lymphadenopathy    Imaging: Reviewed    Assessment:  Mrs. Blanco is a 72-year-old female who presents to the pain clinic as a new patient. She has a complex medical history notable for esophageal perforation, esophageal stricture, hiatal hernia, esophageal anastomotic leak, mediastinitis, neck abscess and multiple infections secondary to surgical intervention. At today's visit, she complains mainly of right chest wall pain and shoulder pain. She initially underwent her first hiatal hernia surgery approximately 2  years ago and has undergone multiple surgical interventions since that time. Last month, she was taking oxycodone 15 mg every 4 hours for her pain symptoms, and over the past 3 weeks, she has tapered down her opioids to oxycodone 5 mg 3 times a day. She states that when she is sleeping at night, she sweats profusely and has to change her shirt 5 times throughout the night. Furthermore, she states that her legs become very painful and restless during the night and that she has to take an oxycodone pill to relieve these symptoms. Based upon history, review of the medical record, physical exam, laboratory studies, and radiographs the most likely diagnoses are opioid dependence, opioid withdrawal, neuropathic pain and esophageal stricture.    Plan:   1. Recommend slower opioid tapering regimen as she is experiencing symptoms of opioid withdrawal.  2. Attempt opioid wean over the next 3-6 months.  3. Consider ITP trial (microdosing) in the future, if she is unable to discontinue enteric opioids  4. RTC PRN     Again, thank you for allowing me to participate in the care of your patient.    Sincerely,  Richard Vigil MD

## 2017-08-28 NOTE — PROGRESS NOTES
Subjective:  72 year old female with past medical history of atrial fibrillation, GERD, esophagectomy, pneumonia and mediastinitis presents for evaluation of chest wall pain and shoulder pain. Pain has been present for 2 years. Pain has gotten worse since that time.     She recently underwent Esophagogastroscopy with removal of stent and replacement of stent (19 x 120 fully covered stent), Endoscopic cauterization of tracheoesophageal fistula, Cauterization chest wound, Fluoroscopy with interpretation on 8/25/17.    In January 2016, she underwent initial hiatal hernia repair which was complicated by infection.    Since that time, she underwent multiple additional surgeries, some of which were complicated by infections requiring Incision and Debridement.    She currently has a J-tube in place, along with an open right chest wall wound which she is currently managing with twice daily dressing changes.    Current treatments include:  Oxycodone 5mg TID, Tylenol    Previous medication treatments included:  Anti-convulsants: Neurontin 300mg TID,   Muscle relaxors: None  Anti-depressants: Trazodone 100mg  NSAIDs: None  Acetaminophen: None  Opioids: Oxycodone 15mg Q4H,       Past Medical History:   Diagnosis Date     Arrhythmia     one time event; no longer on meds     Atrial fibrillation (H)      Breast cancer (H) 2007    right side - lumpectomy, chemo, and local radiation     Chronic infection     infection/wound for two years after esoph surgery     Esophageal perforation      Fibromyalgia      GERD (gastroesophageal reflux disease)      Hiatal hernia      History of blood transfusion      IBS (irritable bowel syndrome)      Meniere's disease     deaf left ear     MS (multiple sclerosis) (H)      Multiple sclerosis (H)      Noninfectious ileitis      Other chronic pain     past medical history reviewed with patient.   Past Surgical History:   Procedure Laterality Date     APPENDECTOMY       BACK SURGERY  5/1/2015     lumbar fusion     BRONCHOSCOPY FLEXIBLE N/A 4/17/2017    Procedure: BRONCHOSCOPY FLEXIBLE;;  Surgeon: Jens Wise MD;  [facility]     C GASTROSTOMY/JEJUN TUBE      J tube for feedings     CLOSE SPIT FISTULA N/A 4/17/2017    Procedure: CLOSE SPIT FISTULA;;  Surgeon: Jens Wise MD;  [facility]     COMBINED ESOPHAGOSCOPY, GASTROSCOPY, DUODENOSCOPY (EGD), REMOVE ESOPHAGEAL STENT N/A 8/26/2016    Procedure: COMBINED ESOPHAGOSCOPY, GASTROSCOPY, DUODENOSCOPY (EGD), REMOVE ESOPHAGEAL STENT;  Surgeon: Jesn Wise MD;  [facility]     COMBINED ESOPHAGOSCOPY, GASTROSCOPY, DUODENOSCOPY (EGD), REPLACE ESOPHAGEAL STENT N/A 8/25/2017    Procedure: COMBINED ESOPHAGOSCOPY, GASTROSCOPY, DUODENOSCOPY (EGD), REPLACE ESOPHAGEAL STENT;;  Surgeon: Jens Wise MD;  [facility]     CREATE SPIT FISTULA N/A 1/9/2017    Procedure: CREATE SPIT FISTULA;  Surgeon: Jens Wise MD;  [facility]     ESOPHAGECTOMY N/A 1/9/2017    Procedure: ESOPHAGECTOMY;  Surgeon: Jens Wise MD;  [facility]     ESOPHAGOSCOPY, GASTROSCOPY, DUODENOSCOPY (EGD), COMBINED N/A 4/18/2016    Procedure: COMBINED ESOPHAGOSCOPY, GASTROSCOPY, DUODENOSCOPY (EGD);  Surgeon: Alexis Barraza MD;  [facility]     ESOPHAGOSCOPY, GASTROSCOPY, DUODENOSCOPY (EGD), COMBINED N/A 4/25/2016    Procedure: COMBINED ESOPHAGOSCOPY, GASTROSCOPY, DUODENOSCOPY (EGD);  Surgeon: Alexis Barraza MD;  [facility]     ESOPHAGOSCOPY, GASTROSCOPY, DUODENOSCOPY (EGD), COMBINED N/A 5/4/2016    Procedure: COMBINED ESOPHAGOSCOPY, GASTROSCOPY, DUODENOSCOPY (EGD);  Surgeon: Alexis Barraza MD;  [facility]     ESOPHAGOSCOPY, GASTROSCOPY, DUODENOSCOPY (EGD), COMBINED N/A 5/18/2016    Procedure: COMBINED ESOPHAGOSCOPY, GASTROSCOPY, DUODENOSCOPY (EGD);  Surgeon: Alexis Barraza MD;  [facility]     ESOPHAGOSCOPY, GASTROSCOPY, DUODENOSCOPY  (EGD), COMBINED N/A 6/22/2016    Procedure: COMBINED ESOPHAGOSCOPY, GASTROSCOPY, DUODENOSCOPY (EGD);  Surgeon: Alexis Barraza MD;  [facility]     ESOPHAGOSCOPY, GASTROSCOPY, DUODENOSCOPY (EGD), COMBINED N/A 7/12/2016    Procedure: COMBINED ESOPHAGOSCOPY, GASTROSCOPY, DUODENOSCOPY (EGD);  Surgeon: Jens Wise MD;  [facility]     ESOPHAGOSCOPY, GASTROSCOPY, DUODENOSCOPY (EGD), COMBINED N/A 4/21/2017    Procedure: COMBINED ESOPHAGOSCOPY, GASTROSCOPY, DUODENOSCOPY (EGD);  Esophagogastroduodenoscopy, Pharyngostomy Tube Placement ;  Surgeon: Jens Wise MD;  [facility]     ESOPHAGOSCOPY, GASTROSCOPY, DUODENOSCOPY (EGD), COMBINED N/A 5/19/2017    Procedure: COMBINED ESOPHAGOSCOPY, GASTROSCOPY, DUODENOSCOPY (EGD);  Upper Endoscopy, Irrigation and Debridement of Chest Wound ;  Surgeon: Nalini Barreto MD;  [facility]     ESOPHAGOSCOPY, GASTROSCOPY, DUODENOSCOPY (EGD), COMBINED N/A 5/23/2017    Procedure: COMBINED ESOPHAGOSCOPY, GASTROSCOPY, DUODENOSCOPY (EGD);;  Surgeon: Nalini Barreto MD;  [facility]     ESOPHAGOSCOPY, GASTROSCOPY, DUODENOSCOPY (EGD), COMBINED N/A 6/27/2017    Procedure: COMBINED ESOPHAGOSCOPY, GASTROSCOPY, DUODENOSCOPY (EGD);  Esophagogastroduodenoscopy With Removal And Replacement Of Esophageal Stent exchange. Exchange of pharyngtomy tube. Chest wound dressing change;  Surgeon: Nalini Barreto MD;  [facility]     ESOPHAGOSCOPY, GASTROSCOPY, DUODENOSCOPY (EGD), COMBINED N/A 8/4/2017    Procedure: COMBINED ESOPHAGOSCOPY, GASTROSCOPY, DUODENOSCOPY (EGD);  Esophagogastroduodenoscopy, Stent Removal and Stent Replacement. Dressing Change ;  Surgeon: Nalini Barreto MD;  [facility]     ESOPHAGOSCOPY, GASTROSCOPY, DUODENOSCOPY (EGD), COMBINED N/A 8/25/2017    Procedure: COMBINED ESOPHAGOSCOPY, GASTROSCOPY, DUODENOSCOPY (EGD);  Esophagogastroduodenoscopy,  Stent Revision,  Cauterization;  Surgeon: Jens Wise MD;  [facility]     ESOPHAGOSCOPY, GASTROSCOPY, DUODENOSCOPY (EGD), DILATATION, COMBINED N/A 6/29/2016    Procedure: COMBINED ESOPHAGOSCOPY, GASTROSCOPY, DUODENOSCOPY (EGD), DILATATION;  Surgeon: Jens Wise MD;  [facility]     EXPLORE NECK N/A 5/19/2017    Procedure: EXPLORE NECK;;  Surgeon: Nalini Barreto MD;  [facility]     HC UGI ENDOSCOPY W TRANSENDOSCOPIC STENT PLACEMENT N/A 7/12/2016    Procedure: COMBINED ESOPHAGOSCOPY, GASTROSCOPY, DUODENOSCOPY (EGD), PLACE TRANSENDOSCOPIC ESOPHAGEAL STENT;  Surgeon: Jens Wise MD;  [facility]     HC UGI ENDOSCOPY W TRANSENDOSCOPIC STENT PLACEMENT N/A 7/22/2016    Procedure: COMBINED ESOPHAGOSCOPY, GASTROSCOPY, DUODENOSCOPY (EGD), PLACE TRANSENDOSCOPIC ESOPHAGEAL STENT;  Surgeon: Jens Wise MD;  [facility]     INCISION AND DRAINAGE CHEST WASHOUT, COMBINED N/A 5/27/2017    Procedure: COMBINED INCISION AND DRAINAGE CHEST WASHOUT;  Chest washout;  Surgeon: Nalini Barreto MD;  [facility]     INCISION AND DRAINAGE CHEST WASHOUT, COMBINED N/A 5/28/2017    Procedure: COMBINED INCISION AND DRAINAGE CHEST WASHOUT;  Chest washout;  Surgeon: Nalini Barreto MD;  [facility]     INCISION AND DRAINAGE CHEST WASHOUT, COMBINED N/A 6/9/2017    Procedure: COMBINED INCISION AND DRAINAGE CHEST WASHOUT;  Chest washout and dressing change, Esophaogastroduodenoscopy;  Surgeon: Jens Wise MD;  [facility]     INCISION AND DRAINAGE CHEST WASHOUT, COMBINED N/A 7/17/2017    Procedure: COMBINED INCISION AND DRAINAGE CHEST WASHOUT;  Incision and Drainage of right Chest Wound, Esophagogastroduodenoscopy with stent exchange. pharangostomy tube revision;  Surgeon: Jens Wise MD;  [facility]     IRRIGATION AND DEBRIDEMENT CHEST WASHOUT, COMBINED N/A 6/29/2016    Procedure: COMBINED IRRIGATION AND DEBRIDEMENT CHEST WASHOUT;  Surgeon: Jens Wise MD;  [facility]     IRRIGATION AND  DEBRIDEMENT CHEST WASHOUT, COMBINED N/A 5/23/2017    Procedure: COMBINED IRRIGATION AND DEBRIDEMENT CHEST WASHOUT;  COMBINED IRRIGATION AND DEBRIDEMENT CHEST WASHOUT,COMBINED ESOPHAGOSCOPY, GASTROSCOPY, DUODENOSCOPY (EGD) ;  Surgeon: Nalini Barreto MD;  [facility]     IRRIGATION AND DEBRIDEMENT CHEST WASHOUT, COMBINED N/A 5/24/2017    Procedure: COMBINED IRRIGATION AND DEBRIDEMENT CHEST WASHOUT;  Chest wound Washout and Dressing Change;  Surgeon: Nalini Barreto MD;  [facility]     IRRIGATION AND DEBRIDEMENT CHEST WASHOUT, COMBINED N/A 5/25/2017    Procedure: COMBINED IRRIGATION AND DEBRIDEMENT CHEST WASHOUT;  Irrigation and Debridement Chest Washout and dressing change;  Surgeon: Nalini Barreto MD;  [facility]     IRRIGATION AND DEBRIDEMENT CHEST WASHOUT, COMBINED N/A 5/26/2017    Procedure: COMBINED IRRIGATION AND DEBRIDEMENT CHEST WASHOUT;  Irrigation and Debridement Chest Washout with  dressing change;  Surgeon: Nalini Barreto MD;  [facility]     IRRIGATION AND DEBRIDEMENT CHEST WASHOUT, COMBINED N/A 5/30/2017    Procedure: COMBINED IRRIGATION AND DEBRIDEMENT CHEST WASHOUT;  Irrigation and Debridement Chest Washout , PICC line dressing change;  Surgeon: Nalini Barreto MD;  [facility]     IRRIGATION AND DEBRIDEMENT CHEST WASHOUT, COMBINED N/A 5/31/2017    Procedure: COMBINED IRRIGATION AND DEBRIDEMENT CHEST WASHOUT;  Irrigation and Debridement Chest Washout ;  Surgeon: Nalini Barreto MD;  [facility]     IRRIGATION AND DEBRIDEMENT CHEST WASHOUT, COMBINED N/A 6/1/2017    Procedure: COMBINED IRRIGATION AND DEBRIDEMENT CHEST WASHOUT;  Chest Wound Irrigation And Debridement with dressing change ;  Surgeon: Nalini Barreto MD;  [facility]     IRRIGATION AND DEBRIDEMENT CHEST WASHOUT, COMBINED N/A 6/4/2017    Procedure: COMBINED IRRIGATION AND DEBRIDEMENT CHEST WASHOUT;  COMBINED IRRIGATION AND DEBRIDEMENT CHEST WASHOUT, Esophagoscopy, Gastroscopy, Duodenoscopy (EGD) place  transendoscopic esophageal stent,   Pharyngostomy and chest wall tube exchange  C-Arm;  Surgeon: Jens Wise MD;  [facility]     IRRIGATION AND DEBRIDEMENT CHEST WASHOUT, COMBINED N/A 6/2/2017    Procedure: COMBINED IRRIGATION AND DEBRIDEMENT CHEST WASHOUT;  Chest Wound Irrigation and Debridement, Dressing Change;  Surgeon: Nalini Barreto MD;  [facility]     LAPAROSCOPIC ASSISTED INSERTION TUBE JEJUNOSTOMY N/A 4/17/2017    Procedure: LAPAROSCOPIC ASSISTED INSERTION TUBE JEJUNOSTOMY;;  Surgeon: Jens Wise MD;  [facility]     LAPAROSCOPY DIAGNOSTIC (GENERAL) N/A 1/9/2017    Procedure: LAPAROSCOPY DIAGNOSTIC (GENERAL);  Surgeon: Jens Wise MD;  [facility]     LAPAROSCOPY DIAGNOSTIC (GENERAL) N/A 4/17/2017    Procedure: LAPAROSCOPY DIAGNOSTIC (GENERAL);  Laparoscopic , Neck Dissection, Spit Fistula Takedown, Laparoscopic Jejunostomy Tube and Pharyngostomy Tube, Gastric Pull up, Upper Endoscopy(EGD)  , Flexible Bronchoscopy ,Sternotomy ;  Surgeon: Jens Wise MD;  [facility]     LUMPECTOMY BREAST Right 2007     NERVE BLOCK PERIPHERAL N/A 8/30/2016    Procedure: NERVE BLOCK PERIPHERAL;  Surgeon: GENERIC ANESTHESIA PROVIDER;  [facility]     NISSEN FUNDOPLICATION  1/2016     PHARYNGOSTOMY N/A 4/18/2016    Procedure: PHARYNGOSTOMY;  Surgeon: Alexis Barraza MD;  [facility]     PHARYNGOSTOMY N/A 4/25/2016    Procedure: PHARYNGOSTOMY;  Surgeon: Alexis Barraza MD;  [facility]     PHARYNGOSTOMY N/A 5/4/2016    Procedure: PHARYNGOSTOMY;  Surgeon: Alexis Barraza MD;  [facility]     PHARYNGOSTOMY N/A 6/22/2016    Procedure: PHARYNGOSTOMY;  Surgeon: Alexis Barraza MD;  [facility]     PHARYNGOSTOMY N/A 6/29/2016    Procedure: PHARYNGOSTOMY;  Surgeon: Jens Wise MD;  [facility]     PHARYNGOSTOMY N/A 4/21/2017    Procedure: PHARYNGOSTOMY;;  Surgeon: Frandy  "Jens Saul MD;  [facility]     PHARYNGOSTOMY Left 5/31/2017    Procedure: PHARYNGOSTOMY;;  Surgeon: Nalini Barreto MD;  [facility]     PICC INSERTION Left 8/25/2016    5fr DL BioFlo PICC, 42cm (3cm external) in the L medial brachial vein w/ tip in the SVC RA junction.     PICC INSERTION - Rewire Right 05/31/2017    5fr DL BioFlo PICC, 40cm (6cm external) in the R medial brachial vein w/ tip in the SVC RA junction.     THORACOTOMY Right 1998    lung infection - \"hard crust formed on lung\"     THORACOTOMY Left 1/9/2017    Procedure: THORACOTOMY;  Surgeon: Jens Wise MD;  [facility]     WRIST SURGERY      past surgical history reviewed with patient.   Medications:  Current Outpatient Prescriptions   Medication     oxyCODONE (ROXICODONE) 5 MG IR tablet     MELATONIN PO     UNABLE TO FIND     acetaminophen (TYLENOL) 32 mg/mL solution     diphenoxylate-atropine (LOMOTIL) 2.5-0.025 MG per tablet     pantoprazole (PROTONIX) 40 MG EC tablet     diphenoxylate-atropine (LOMOTIL) 2.5-0.025 MG/5ML liquid     ferrous sulfate 300 (60 FE) MG/5ML syrup     clotrimazole (LOTRIMIN) 1 % cream     chlorhexidine (PERIDEX) 0.12 % solution     gabapentin (NEURONTIN) 250 MG/5ML solution     chlorhexidine (HIBICLENS) 4 % liquid     fiber modular (NUTRISOURCE FIBER) packet     traZODone (DESYREL) 100 MG tablet     order for DME     order for DME     DiphenhydrAMINE HCl (BENADRYL PO)     cholestyramine (QUESTRAN) 4 G Packet     multivitamins with minerals (CERTAVITE/CEROVITE) LIQD liquid     loperamide (IMODIUM) 1 MG/5ML liquid     Lactobacillus Rhamnosus, GG, (CULTURELLE PO)     No current facility-administered medications for this visit.      [place] and [place] Prescription Monitoring Program reviewed    Allergies:     Allergies   Allergen Reactions     Amoxicillin Diarrhea     Ativan [Lorazepam] Other (See Comments)     Hallucinations     Hydromorphone Itching     4/12/17 - patient open to using this as she " tolerated Hydromorphone PCA during hospitalization in January 2017.      Morphine Itching     Family History:  family history includes Alzheimer Disease in her mother; Breast Cancer in her daughter; CEREBROVASCULAR DISEASE in her father; Ovarian Cancer in her sister.  Social history:  Smoking: none. Alcohol: none. Street drugs: none.   Review Of Systems    14 point ROS negative except per HPI  Objective:   /78  Pulse 82  Ht 1.524 m (5')  Wt 40.5 kg (89 lb 4.8 oz)  BMI 17.44 kg/m2  Body mass index is 17.44 kg/(m^2).  General: In no apparent distress  Mental status: Normal affect, pleasant  Head: Atraumatic, normocephalic  Eyes: Extra-ocular movements grossly intact, no scleral icterus  Cardiovascular: Regular rate  Respiratory: No respiratory distress  Abdomen: soft, non-distended, J-tube in place  Msk: Bilateral hips, knees have normal range of motion. Pain with extension and rotation of lumbar spine  Neuro: AAOx3.   CN II-XII are grossly intact.   Chest Wall: right-sided healing surgical wound with granulation tissue present, no signs of infection or erythema  Lymph: no supraclavicular lymphadenopathy    Imaging: Reviewed    Assessment:    Mrs. Blanco is a 72-year-old female who presents to the pain clinic as a new patient. She has a complex medical history notable for esophageal perforation, esophageal stricture, hiatal hernia, esophageal anastomotic leak, mediastinitis, neck abscess and multiple infections secondary to surgical intervention. At today's visit, she complains mainly of right chest wall pain and shoulder pain. She initially underwent her first hiatal hernia surgery approximately 2 years ago and has undergone multiple surgical interventions since that time. Last month, she was taking oxycodone 15 mg every 4 hours for her pain symptoms, and over the past 3 weeks, she has tapered down her opioids to oxycodone 5 mg 3 times a day. She states that when she is sleeping at night, she sweats  profusely and has to change her shirt 5 times throughout the night. Furthermore, she states that her legs become very painful and restless during the night and that she has to take an oxycodone pill to relieve these symptoms. Based upon history, review of the medical record, physical exam, laboratory studies, and radiographs the most likely diagnoses are opioid dependence, opioid withdrawal, neuropathic pain and esophageal stricture.    Plan:     1. Recommend slower opioid tapering regimen as she is experiencing symptoms of opioid withdrawal.  2. Attempt opioid wean over the next 6 months with the following regimen:         Month 1: oxycodone 5mg Q4H PRN         Month 2: oxycodone 5mg Q4-6H PRN         Month 3: oxycodone 5mg Q6H PRN         Month 4: oxycodone 5mg Q8H PRN         Month 5: oxycodone 5mg BID PRN         Month 6: oxycodone 5mg daily PRN  3. Consider ITP trial (microdosing) in the future, if she is unable to discontinue enteric opioids  4. RTC PRN

## 2017-08-28 NOTE — MR AVS SNAPSHOT
After Visit Summary   8/28/2017    Minerva Blanco    MRN: [id number]           Patient Information     Date Of Birth          1945        Visit Information        Provider Department      8/28/2017 3:50 PM Richard Vigil MD [facility]        Care Instructions    1. Recommendations made to thoracic surgeon for opioid weaning.        Follow up: As needed       To speak with a nurse, schedule/reschedule/cancel a clinic appointment, or request a medication refill call: [phone]     You can also reach us by WappZapp: [email or web address]    For refills, please call on Monday, 1 week before your medication runs out. The doctors are not always in clinic, so this gives us time to get your prescriptions ready.  Please let us know the name of the medication you are requesting a refill of.                                       Follow-ups after your visit        Who to contact     Please call your clinic at [phone] to:    Ask questions about your health    Make or cancel appointments    Discuss your medicines    Learn about your test results    Speak to your doctor   If you have compliments or concerns about an experience at your clinic, or if you wish to file a complaint, please contact [facility] Patient Relations at [phone] or email us at [email or web address]         Additional Information About Your Visit        BurppleharWelocalize Information     WappZapp is an electronic gateway that provides easy, online access to your medical records. With WappZapp, you can request a clinic appointment, read your test results, renew a prescription or communicate with your care team.     To sign up for [employer] visit the website at [email or web address]   You will be asked to enter the access code listed below, as well as some personal information. Please follow the directions to create your  username and password.     Your access code is: [email or web address]  Expires: 2017 12:26 PM     Your access code will  in 90 days. If you need help or a new code, please contact your [facility] or call [phone] for assistance.        Care EveryWhere ID     This is your Care EveryWhere ID. This could be used by other organizations to access your [place] medical records  [id number]        Your Vitals Were     Pulse Height BMI (Body Mass Index)             82 1.524 m (5') 17.44 kg/m2          Blood Pressure from Last 3 Encounters:   17 133/78   17 101/64   17 130/73    Weight from Last 3 Encounters:   17 40.5 kg (89 lb 4.8 oz)   17 39.7 kg (87 lb 8.4 oz)   17 39.4 kg (86 lb 13.8 oz)              Today, you had the following     No orders found for display         Today's Medication Changes          These changes are accurate as of: 17  5:43 PM.  If you have any questions, ask your nurse or doctor.               These medicines have changed or have updated prescriptions.        Dose/Directions    loperamide 1 MG/5ML liquid   Commonly known as:  IMODIUM   This may have changed:  when to take this   Used for:  Bowel habit changes        Dose:  4 mg   Take 20 mLs (4 mg) by mouth 4 times daily as needed for diarrhea   Quantity:  200 mL   Refills:  0       traZODone 100 MG tablet   Commonly known as:  DESYREL   This may have changed:  how much to take   Used for:  Insomnia, unspecified type        Dose:  50 mg   0.5 tablets (50 mg) by Per G Tube route At Bedtime   Quantity:  30 tablet   Refills:  0                Primary Care Provider Office Phone # Fax #    Andrea Nino [phone]       [address] [address]        Equal Access to Services     TOM JORGENSEN AH: Dora Meng, isis romero, qaflorentin gaona . So [facility]  [phone].    ATENCIÓN: Si jareth rojo, tiene a jackson disposición servicios gratuitos de asistencia lingüística. Sreekanth tee [phone].    We comply with applicable federal civil rights laws and [place] laws. We do not discriminate on the basis of race, color, national origin, age, disability sex, sexual orientation or gender identity.            Thank you!     Thank you for choosing [facility]  for your care. Our goal is always to provide you with excellent care. Hearing back from our patients is one way we can continue to improve our services. Please take a few minutes to complete the written survey that you may receive in the mail after your visit with us. Thank you!             Your Updated Medication List - Protect others around you: Learn how to safely use, store and throw away your medicines at www.disposemymeds.org.          This list is accurate as of: 8/28/17  5:43 PM.  Always use your most recent med list.                   Brand Name Dispense Instructions for use Diagnosis    acetaminophen 32 mg/mL solution    TYLENOL    300 mL    30.45 mLs (975 mg) by Per Feeding Tube route 3 times daily    Esophageal perforation       BENADRYL PO      Take 25 mg by mouth nightly as needed        chlorhexidine 0.12 % solution    PERIDEX     Swish and spit 15 mLs in mouth 2 times daily    Esophageal perforation       chlorhexidine 4 % liquid    HIBICLENS    200 mL    Apply topically 2 times daily    History of esophagectomy       cholestyramine 4 G Packet    QUESTRAN     Take 1 packet by mouth daily        clotrimazole 1 % cream    LOTRIMIN    12 g    Apply topically 2 times daily    Wound abscess, subsequent encounter       CULTURELLE PO      Take 1 tablet by mouth 2 times daily        * diphenoxylate-atropine 2.5-0.025 MG/5ML liquid    LOMOTIL    300 mL    Take 5 mLs by mouth 4 times daily as needed for diarrhea    Bowel habit changes       * diphenoxylate-atropine 2.5-0.025  MG per tablet    LOMOTIL    40 tablet    Take 1 tablet by mouth 4 times daily as needed for diarrhea    Esophageal anastomotic leak       ferrous sulfate 300 (60 FE) MG/5ML syrup     150 mL    5 mLs (300 mg) by Per J Tube route daily    Anemia due to other cause       fiber modular packet     60 packet    1 packet by Per Feeding Tube route 3 times daily (with meals)    History of esophagectomy       gabapentin 250 MG/5ML solution    NEURONTIN    550 mL    Take 6 mLs (300 mg) by mouth every 8 hours    Acute post-operative pain       loperamide 1 MG/5ML liquid    IMODIUM    200 mL    Take 20 mLs (4 mg) by mouth 4 times daily as needed for diarrhea    Bowel habit changes       MELATONIN PO      Take 5 mg by mouth At Bedtime        multivitamins with minerals Liqd liquid      Take 15 mLs by mouth daily        * order for DME     1 Device    Equipment being ordered:  [facility] Suction Machine-Intermittent Suction Canisters(2) Suction Canister Holders(2) Suction Connect Tube(2) 5 in 1 Connector(2) Bacteria Filter(2) Yaunkauer Suction(2)  Treatment Diagnosis: Esophogeal Perforation, s/p esophagectomy    Hx of esophagectomy       * order for DME     1 Device    Equipment being ordered:  Suction Pump Suction Canister(2) Suction Tubing(2) Bacteria Filter(2) Yaunkauer(4) Red Rubber Catheter(2)  Treatment Diagnosis: Esophogeal Perforation, s/p esophagectomy    Esophageal perforation       oxyCODONE 5 MG IR tablet    ROXICODONE    10 tablet    Take 1-2 tablets (5-10 mg) by mouth every 3 hours as needed for pain or other (Moderate to Severe)    Esophageal anastomotic leak       pantoprazole 40 MG EC tablet    PROTONIX    30 tablet    Take by mouth 30-60 minutes before a meal.    Gastroesophageal reflux disease, esophagitis presence not specified       traZODone 100 MG tablet    DESYREL    30 tablet    0.5 tablets (50 mg) by Per G Tube route At Bedtime    Insomnia, unspecified type       UNABLE TO FIND      2 nell supplement 4 cans  daily per g tube        * Notice:  This list has 4 medication(s) that are the same as other medications prescribed for you. Read the directions carefully, and ask your doctor or other care provider to review them with you.

## 2017-08-28 NOTE — PATIENT INSTRUCTIONS
1. Recommendations made to thoracic surgeon for opioid weaning.        Follow up: As needed       To speak with a nurse, schedule/reschedule/cancel a clinic appointment, or request a medication refill call: [phone]     You can also reach us by RewardLoop: [email or web address]    For refills, please call on Monday, 1 week before your medication runs out. The doctors are not always in clinic, so this gives us time to get your prescriptions ready.  Please let us know the name of the medication you are requesting a refill of.

## 2017-08-29 ENCOUNTER — TELEPHONE (OUTPATIENT)
Dept: ANESTHESIOLOGY | Facility: CLINIC | Age: 72
End: 2017-08-29

## 2017-08-29 DIAGNOSIS — K91.89 ESOPHAGEAL ANASTOMOTIC LEAK: ICD-10-CM

## 2017-08-29 DIAGNOSIS — G89.29 OTHER CHRONIC PAIN: Primary | ICD-10-CM

## 2017-08-29 DIAGNOSIS — J86.0 TRACHEOESOPHAGEAL FISTULA (H): Primary | ICD-10-CM

## 2017-08-29 RX ORDER — OXYCODONE HYDROCHLORIDE 5 MG/1
5 TABLET ORAL EVERY 4 HOURS PRN
Qty: 42 TABLET | Refills: 0 | Status: SHIPPED | OUTPATIENT
Start: 2017-08-29 | End: 2017-09-01

## 2017-08-29 RX ORDER — OXYCODONE HYDROCHLORIDE 10 MG/1
10 TABLET ORAL EVERY 6 HOURS PRN
Qty: 20 TABLET | Refills: 0 | Status: ON HOLD | OUTPATIENT
Start: 2017-08-29 | End: 2017-09-06

## 2017-08-29 RX ORDER — CHLORHEXIDINE GLUCONATE 40 MG/ML
SOLUTION TOPICAL ONCE
Status: CANCELLED | OUTPATIENT
Start: 2017-08-29 | End: 2017-08-29

## 2017-08-29 ASSESSMENT — ANXIETY QUESTIONNAIRES: GAD7 TOTAL SCORE: 2

## 2017-08-29 NOTE — PROGRESS NOTES
"Message from Minerva @[phone] stating she is \"out of pills\" and needs more pain pills. She saw someone in the Pain Clinic yesterday who would like her opioid weaning to be done more slowly however, there is no clearly delineated plan regarding this. Multiple calls to the Pain Clinic resulted in us learning that Dr. Vigil has left for the week. Dr. Wise will give Minerva enough to get through this holiday weekend and will readdress this issue with Dr. Vigil Tuesday morning.  "

## 2017-08-29 NOTE — TELEPHONE ENCOUNTER
"Pt's  called clinic requesting pain medication refill for pt.  Pt's  reported that Dr. Wise is no longer prescribing for pt since she was seen by pain clinic on 8/28/17. Pt's husbanding demanding prescription from Dr. Vigil for oxycodone, reporting that pt is out of medication and is experiencing symptoms of withdrawal.  RNCC reviewed Dr. Vigil' clinic note with pt's , which included the following recommendations:  \"Plan:    1. Recommend slower opioid tapering regimen as she is experiencing symptoms of opioid withdrawal.  2. Attempt opioid wean over the next 3-6 months.  3. Consider ITP trial (microdosing) in the future, if she is unable to discontinue enteric opioids  4. RTC PRN\"  Per clinic policy, our providers do not take over writing narcotic prescriptions from other providers.  The pt/pt's  were informed of this policy during the clinic visit.  The opioid tapering recommendations given by Dr. Vigil were for the pt's prescribing provider.  Pt instructed to follow up with prescribing provider for refills.  RNCC advised pt that Dr. Wise's office can contact clinic regarding the recommendations if needed. Pt/pt's  verbalized understanding at clinic visit.  Pt's  informed of this again during telephone call.      Pt's  became increasingly agitated and verbally aggressive, and stated, \"I am coming down there right now to the 4th floor, and someone is giving me a prescription for my wife.\" RNCC informed pt's  that Dr. Vigil is no longer in clinic today, that MD will be notified, and RNCC will follow up with pt/pt's  upon MD's response. Pt's  repeated his plan to drive down to the [facility] and hung up, ending the phone call.     At approximately 1530, RNCC notified by ally that pt's  in lobby requesting to speak with clinic nurse.  Clinic nurse went out to the lobby to speak with pt's , during which time Silvia Armstrong, " CLIVE, with Dr. Wise called RNCC regarding pt.  RNCC informed that Dr. Wise's office assumed pt's opioid prescription management would be taken over by pain clinic provider.  RNCC reviewed clinic policy and Dr. Vigil' recommendations detailed above. Silvia also informed that Dr. Vigil has left for the day and will be out of clinic on vacation.  Silvia agreed to prescribe a minimal amount of oxycodone to get pt through until Tuesday.  RNCC agreeable to consult with other clinic providers regarding further recommendations.      Pt's  updated that Dr. Wise's office plans to write an oxycodone prescription.      France Meza, RN, BSN

## 2017-08-31 ENCOUNTER — AMBULATORY - HEALTHEAST (OUTPATIENT)
Dept: PALLIATIVE MEDICINE | Facility: OTHER | Age: 72
End: 2017-08-31

## 2017-08-31 ENCOUNTER — TELEPHONE (OUTPATIENT)
Dept: SURGERY | Facility: CLINIC | Age: 72
End: 2017-08-31

## 2017-08-31 ENCOUNTER — TELEPHONE (OUTPATIENT)
Dept: ONCOLOGY | Facility: CLINIC | Age: 72
End: 2017-08-31

## 2017-08-31 DIAGNOSIS — K22.3 ESOPHAGEAL PERFORATION: ICD-10-CM

## 2017-08-31 NOTE — TELEPHONE ENCOUNTER
Received call from spouse Enrique requesting a call back or email regarding refill of oxycodone. He stated pt will be out on Labor Day and does not want her to go without medication until Tuesday. Reviewed with spouse that they picked up Oxycodone 5 mg #42 tablets on 8/29. There is a 2nd script for Oxycodone 10 mg #20 tablets written/printed on 8/29 but unsure where that script was sent to. Enrique stated a call back to his cell phone or email on file: [email or web address] needed today. He asked several times for [employer]'s [facility] email and was told unable to give out because whatever messages he sends there is not a part of patient's chart. He verbalized understanding and will await call.

## 2017-09-01 DIAGNOSIS — K91.89 ESOPHAGEAL ANASTOMOTIC LEAK: ICD-10-CM

## 2017-09-01 RX ORDER — OXYCODONE HYDROCHLORIDE 5 MG/1
5 TABLET ORAL EVERY 4 HOURS PRN
Qty: 12 TABLET | Refills: 0 | Status: ON HOLD | OUTPATIENT
Start: 2017-09-01 | End: 2017-09-15

## 2017-09-03 PROCEDURE — 99284 EMERGENCY DEPT VISIT MOD MDM: CPT | Mod: 25 | Performed by: EMERGENCY MEDICINE

## 2017-09-03 PROCEDURE — 99284 EMERGENCY DEPT VISIT MOD MDM: CPT | Mod: Z6 | Performed by: EMERGENCY MEDICINE

## 2017-09-04 ENCOUNTER — HOSPITAL ENCOUNTER (EMERGENCY)
Facility: CLINIC | Age: 72
Discharge: HOME | End: 2017-09-04
Attending: EMERGENCY MEDICINE | Admitting: EMERGENCY MEDICINE
Payer: MEDICARE

## 2017-09-04 ENCOUNTER — APPOINTMENT (OUTPATIENT)
Dept: GENERAL RADIOLOGY | Facility: CLINIC | Age: 72
End: 2017-09-04
Attending: EMERGENCY MEDICINE
Payer: MEDICARE

## 2017-09-04 VITALS
TEMPERATURE: 98.5 F | HEART RATE: 68 BPM | HEIGHT: 60 IN | DIASTOLIC BLOOD PRESSURE: 82 MMHG | SYSTOLIC BLOOD PRESSURE: 144 MMHG | RESPIRATION RATE: 16 BRPM | OXYGEN SATURATION: 95 %

## 2017-09-04 DIAGNOSIS — K94.20 COMPLICATION OF FEEDING TUBE (H): ICD-10-CM

## 2017-09-04 DIAGNOSIS — T85.848A PAIN DUE TO ANY DEVICE, IMPLANT OR GRAFT, INITIAL ENCOUNTER: ICD-10-CM

## 2017-09-04 DIAGNOSIS — G35 MS (MULTIPLE SCLEROSIS) (H): ICD-10-CM

## 2017-09-04 DIAGNOSIS — I48.91 ATRIAL FIBRILLATION, UNSPECIFIED TYPE (H): ICD-10-CM

## 2017-09-04 PROCEDURE — 25000128 H RX IP 250 OP 636: Performed by: EMERGENCY MEDICINE

## 2017-09-04 PROCEDURE — 96372 THER/PROPH/DIAG INJ SC/IM: CPT | Performed by: EMERGENCY MEDICINE

## 2017-09-04 PROCEDURE — 74000 XR ABDOMEN 1 VW: CPT

## 2017-09-04 RX ORDER — HYDROMORPHONE HYDROCHLORIDE 1 MG/ML
0.5 INJECTION, SOLUTION INTRAMUSCULAR; INTRAVENOUS; SUBCUTANEOUS ONCE
Status: COMPLETED | OUTPATIENT
Start: 2017-09-04 | End: 2017-09-04

## 2017-09-04 RX ADMIN — HYDROMORPHONE HYDROCHLORIDE 0.5 MG: 1 INJECTION, SOLUTION INTRAMUSCULAR; INTRAVENOUS; SUBCUTANEOUS at 03:38

## 2017-09-04 RX ADMIN — DIATRIZOATE MEGLUMINE AND DIATRIZOATE SODIUM 20 ML: 660; 100 SOLUTION ORAL; RECTAL at 04:49

## 2017-09-04 RX ADMIN — HYDROMORPHONE HYDROCHLORIDE 0.5 MG: 1 INJECTION, SOLUTION INTRAMUSCULAR; INTRAVENOUS; SUBCUTANEOUS at 01:13

## 2017-09-04 ASSESSMENT — ENCOUNTER SYMPTOMS
ABDOMINAL PAIN: 1
FEVER: 0
SHORTNESS OF BREATH: 0

## 2017-09-04 NOTE — ED PROVIDER NOTES
History     Chief Complaint   Patient presents with     Feeding Problems     area around feeding tube is swollen under the skin     HPI  Minerva Blanco is a 72 year old female with past medical history of atrial fibrillation, GERD, esophagectomy, with GJ tube who presents with pain and swelling around her GJ tube. The patient states that it has been sore for 4-5 days and says that it now hurts all the time. She has been able to feed through it without difficulty and no increase in her pain. She denies bumping it, pulling on it or any other cause of injury. She only takes water by mouth. She noticed some fullness and a lump around her GJ tube.  She has had no leakage or discharge.  She has no nausea or vomiting.        I have reviewed the Medications, Allergies, Past Medical and Surgical History, and Social History in the [employer]: online travellers club system.  Past Medical History:   Diagnosis Date     Arrhythmia     one time event; no longer on meds     Atrial fibrillation (H)      Breast cancer (H) 2007    right side - lumpectomy, chemo, and local radiation     Chronic infection     infection/wound for two years after esoph surgery     Esophageal perforation      Fibromyalgia      GERD (gastroesophageal reflux disease)      Hiatal hernia      History of blood transfusion      IBS (irritable bowel syndrome)      Meniere's disease     deaf left ear     MS (multiple sclerosis) (H)      Multiple sclerosis (H)      Noninfectious ileitis      Other chronic pain        Past Surgical History:   Procedure Laterality Date     APPENDECTOMY       BACK SURGERY  5/1/2015    lumbar fusion     BRONCHOSCOPY FLEXIBLE N/A 4/17/2017    Procedure: BRONCHOSCOPY FLEXIBLE;;  Surgeon: Jens Wise MD;  [facility]     C GASTROSTOMY/JEJUN TUBE      J tube for feedings     CLOSE SPIT FISTULA N/A 4/17/2017    Procedure: CLOSE SPIT FISTULA;;  Surgeon: Jens Wise MD;  [facility]     COMBINED ESOPHAGOSCOPY, GASTROSCOPY,  DUODENOSCOPY (EGD), REMOVE ESOPHAGEAL STENT N/A 8/26/2016    Procedure: COMBINED ESOPHAGOSCOPY, GASTROSCOPY, DUODENOSCOPY (EGD), REMOVE ESOPHAGEAL STENT;  Surgeon: Jens Wise MD;  [facility]     COMBINED ESOPHAGOSCOPY, GASTROSCOPY, DUODENOSCOPY (EGD), REPLACE ESOPHAGEAL STENT N/A 8/25/2017    Procedure: COMBINED ESOPHAGOSCOPY, GASTROSCOPY, DUODENOSCOPY (EGD), REPLACE ESOPHAGEAL STENT;;  Surgeon: Jens Wise MD;  [facility]     CREATE SPIT FISTULA N/A 1/9/2017    Procedure: CREATE SPIT FISTULA;  Surgeon: Jens Wise MD;  [facility]     ESOPHAGECTOMY N/A 1/9/2017    Procedure: ESOPHAGECTOMY;  Surgeon: Jens Wise MD;  [facility]     ESOPHAGOSCOPY, GASTROSCOPY, DUODENOSCOPY (EGD), COMBINED N/A 4/18/2016    Procedure: COMBINED ESOPHAGOSCOPY, GASTROSCOPY, DUODENOSCOPY (EGD);  Surgeon: Alexis Barraza MD;  [facility]     ESOPHAGOSCOPY, GASTROSCOPY, DUODENOSCOPY (EGD), COMBINED N/A 4/25/2016    Procedure: COMBINED ESOPHAGOSCOPY, GASTROSCOPY, DUODENOSCOPY (EGD);  Surgeon: Alexis Barraza MD;  [facility]     ESOPHAGOSCOPY, GASTROSCOPY, DUODENOSCOPY (EGD), COMBINED N/A 5/4/2016    Procedure: COMBINED ESOPHAGOSCOPY, GASTROSCOPY, DUODENOSCOPY (EGD);  Surgeon: Alexis Barraza MD;  [facility]     ESOPHAGOSCOPY, GASTROSCOPY, DUODENOSCOPY (EGD), COMBINED N/A 5/18/2016    Procedure: COMBINED ESOPHAGOSCOPY, GASTROSCOPY, DUODENOSCOPY (EGD);  Surgeon: Alexis Barraza MD;  [facility]     ESOPHAGOSCOPY, GASTROSCOPY, DUODENOSCOPY (EGD), COMBINED N/A 6/22/2016    Procedure: COMBINED ESOPHAGOSCOPY, GASTROSCOPY, DUODENOSCOPY (EGD);  Surgeon: Alexis Barraza MD;  [facility]     ESOPHAGOSCOPY, GASTROSCOPY, DUODENOSCOPY (EGD), COMBINED N/A 7/12/2016    Procedure: COMBINED ESOPHAGOSCOPY, GASTROSCOPY, DUODENOSCOPY (EGD);  Surgeon: Jens Wise MD;  [facility]      ESOPHAGOSCOPY, GASTROSCOPY, DUODENOSCOPY (EGD), COMBINED N/A 4/21/2017    Procedure: COMBINED ESOPHAGOSCOPY, GASTROSCOPY, DUODENOSCOPY (EGD);  Esophagogastroduodenoscopy, Pharyngostomy Tube Placement ;  Surgeon: Jens Wise MD;  [facility]     ESOPHAGOSCOPY, GASTROSCOPY, DUODENOSCOPY (EGD), COMBINED N/A 5/19/2017    Procedure: COMBINED ESOPHAGOSCOPY, GASTROSCOPY, DUODENOSCOPY (EGD);  Upper Endoscopy, Irrigation and Debridement of Chest Wound ;  Surgeon: Nalini Barreto MD;  [facility]     ESOPHAGOSCOPY, GASTROSCOPY, DUODENOSCOPY (EGD), COMBINED N/A 5/23/2017    Procedure: COMBINED ESOPHAGOSCOPY, GASTROSCOPY, DUODENOSCOPY (EGD);;  Surgeon: Nalini Barreto MD;  [facility]     ESOPHAGOSCOPY, GASTROSCOPY, DUODENOSCOPY (EGD), COMBINED N/A 6/27/2017    Procedure: COMBINED ESOPHAGOSCOPY, GASTROSCOPY, DUODENOSCOPY (EGD);  Esophagogastroduodenoscopy With Removal And Replacement Of Esophageal Stent exchange. Exchange of pharyngtomy tube. Chest wound dressing change;  Surgeon: Nalini Barreto MD;  [facility]     ESOPHAGOSCOPY, GASTROSCOPY, DUODENOSCOPY (EGD), COMBINED N/A 8/4/2017    Procedure: COMBINED ESOPHAGOSCOPY, GASTROSCOPY, DUODENOSCOPY (EGD);  Esophagogastroduodenoscopy, Stent Removal and Stent Replacement. Dressing Change ;  Surgeon: Nalini Barreto MD;  [facility]     ESOPHAGOSCOPY, GASTROSCOPY, DUODENOSCOPY (EGD), COMBINED N/A 8/25/2017    Procedure: COMBINED ESOPHAGOSCOPY, GASTROSCOPY, DUODENOSCOPY (EGD);  Esophagogastroduodenoscopy,  Stent Revision,  Cauterization;  Surgeon: Jens Wise MD;  [facility]     ESOPHAGOSCOPY, GASTROSCOPY, DUODENOSCOPY (EGD), DILATATION, COMBINED N/A 6/29/2016    Procedure: COMBINED ESOPHAGOSCOPY, GASTROSCOPY, DUODENOSCOPY (EGD), DILATATION;  Surgeon: Jens Wise MD;  [facility]     EXPLORE NECK N/A 5/19/2017    Procedure: EXPLORE NECK;;  Surgeon: Nalini Barreto MD;  [facility]     HC UGI ENDOSCOPY W  TRANSENDOSCOPIC STENT PLACEMENT N/A 7/12/2016    Procedure: COMBINED ESOPHAGOSCOPY, GASTROSCOPY, DUODENOSCOPY (EGD), PLACE TRANSENDOSCOPIC ESOPHAGEAL STENT;  Surgeon: Jens Wise MD;  [facility]     HC UGI ENDOSCOPY W TRANSENDOSCOPIC STENT PLACEMENT N/A 7/22/2016    Procedure: COMBINED ESOPHAGOSCOPY, GASTROSCOPY, DUODENOSCOPY (EGD), PLACE TRANSENDOSCOPIC ESOPHAGEAL STENT;  Surgeon: Jens Wise MD;  [facility]     INCISION AND DRAINAGE CHEST WASHOUT, COMBINED N/A 5/27/2017    Procedure: COMBINED INCISION AND DRAINAGE CHEST WASHOUT;  Chest washout;  Surgeon: Nalini Barreto MD;  [facility]     INCISION AND DRAINAGE CHEST WASHOUT, COMBINED N/A 5/28/2017    Procedure: COMBINED INCISION AND DRAINAGE CHEST WASHOUT;  Chest washout;  Surgeon: Nalini Barreto MD;  [facility]     INCISION AND DRAINAGE CHEST WASHOUT, COMBINED N/A 6/9/2017    Procedure: COMBINED INCISION AND DRAINAGE CHEST WASHOUT;  Chest washout and dressing change, Esophaogastroduodenoscopy;  Surgeon: Jens Wise MD;  [facility]     INCISION AND DRAINAGE CHEST WASHOUT, COMBINED N/A 7/17/2017    Procedure: COMBINED INCISION AND DRAINAGE CHEST WASHOUT;  Incision and Drainage of right Chest Wound, Esophagogastroduodenoscopy with stent exchange. pharangostomy tube revision;  Surgeon: Jens Wise MD;  [facility]     IRRIGATION AND DEBRIDEMENT CHEST WASHOUT, COMBINED N/A 6/29/2016    Procedure: COMBINED IRRIGATION AND DEBRIDEMENT CHEST WASHOUT;  Surgeon: Jens Wise MD;  [facility]     IRRIGATION AND DEBRIDEMENT CHEST WASHOUT, COMBINED N/A 5/23/2017    Procedure: COMBINED IRRIGATION AND DEBRIDEMENT CHEST WASHOUT;  COMBINED IRRIGATION AND DEBRIDEMENT CHEST WASHOUT,COMBINED ESOPHAGOSCOPY, GASTROSCOPY, DUODENOSCOPY (EGD) ;  Surgeon: Nalini Barreto MD;  [facility]     IRRIGATION AND DEBRIDEMENT CHEST WASHOUT, COMBINED N/A 5/24/2017    Procedure: COMBINED  IRRIGATION AND DEBRIDEMENT CHEST WASHOUT;  Chest wound Washout and Dressing Change;  Surgeon: Nalini Barreto MD;  [facility]     IRRIGATION AND DEBRIDEMENT CHEST WASHOUT, COMBINED N/A 5/25/2017    Procedure: COMBINED IRRIGATION AND DEBRIDEMENT CHEST WASHOUT;  Irrigation and Debridement Chest Washout and dressing change;  Surgeon: Nalini Barreto MD;  [facility]     IRRIGATION AND DEBRIDEMENT CHEST WASHOUT, COMBINED N/A 5/26/2017    Procedure: COMBINED IRRIGATION AND DEBRIDEMENT CHEST WASHOUT;  Irrigation and Debridement Chest Washout with  dressing change;  Surgeon: Nalini Barreto MD;  [facility]     IRRIGATION AND DEBRIDEMENT CHEST WASHOUT, COMBINED N/A 5/30/2017    Procedure: COMBINED IRRIGATION AND DEBRIDEMENT CHEST WASHOUT;  Irrigation and Debridement Chest Washout , PICC line dressing change;  Surgeon: Nalini Barreto MD;  [facility]     IRRIGATION AND DEBRIDEMENT CHEST WASHOUT, COMBINED N/A 5/31/2017    Procedure: COMBINED IRRIGATION AND DEBRIDEMENT CHEST WASHOUT;  Irrigation and Debridement Chest Washout ;  Surgeon: Nalini Barreto MD;  [facility]     IRRIGATION AND DEBRIDEMENT CHEST WASHOUT, COMBINED N/A 6/1/2017    Procedure: COMBINED IRRIGATION AND DEBRIDEMENT CHEST WASHOUT;  Chest Wound Irrigation And Debridement with dressing change ;  Surgeon: Nalini Barreto MD;  [facility]     IRRIGATION AND DEBRIDEMENT CHEST WASHOUT, COMBINED N/A 6/4/2017    Procedure: COMBINED IRRIGATION AND DEBRIDEMENT CHEST WASHOUT;  COMBINED IRRIGATION AND DEBRIDEMENT CHEST WASHOUT, Esophagoscopy, Gastroscopy, Duodenoscopy (EGD) place transendoscopic esophageal stent,   Pharyngostomy and chest wall tube exchange  C-Arm;  Surgeon: Jens Wise MD;  [facility]     IRRIGATION AND DEBRIDEMENT CHEST WASHOUT, COMBINED N/A 6/2/2017    Procedure: COMBINED IRRIGATION AND DEBRIDEMENT CHEST WASHOUT;  Chest Wound Irrigation and Debridement, Dressing Change;  Surgeon: Nalini Barreto MD;   [facility]     LAPAROSCOPIC ASSISTED INSERTION TUBE JEJUNOSTOMY N/A 4/17/2017    Procedure: LAPAROSCOPIC ASSISTED INSERTION TUBE JEJUNOSTOMY;;  Surgeon: Jens Wise MD;  [facility]     LAPAROSCOPY DIAGNOSTIC (GENERAL) N/A 1/9/2017    Procedure: LAPAROSCOPY DIAGNOSTIC (GENERAL);  Surgeon: Jens Wise MD;  [facility]     LAPAROSCOPY DIAGNOSTIC (GENERAL) N/A 4/17/2017    Procedure: LAPAROSCOPY DIAGNOSTIC (GENERAL);  Laparoscopic , Neck Dissection, Spit Fistula Takedown, Laparoscopic Jejunostomy Tube and Pharyngostomy Tube, Gastric Pull up, Upper Endoscopy(EGD)  , Flexible Bronchoscopy ,Sternotomy ;  Surgeon: Jens Wise MD;  [facility]     LUMPECTOMY BREAST Right 2007     NERVE BLOCK PERIPHERAL N/A 8/30/2016    Procedure: NERVE BLOCK PERIPHERAL;  Surgeon: GENERIC ANESTHESIA PROVIDER;  [facility]     NISSEN FUNDOPLICATION  1/2016     PHARYNGOSTOMY N/A 4/18/2016    Procedure: PHARYNGOSTOMY;  Surgeon: Alexis Barraza MD;  [facility]     PHARYNGOSTOMY N/A 4/25/2016    Procedure: PHARYNGOSTOMY;  Surgeon: Alexis Barraza MD;  [facility]     PHARYNGOSTOMY N/A 5/4/2016    Procedure: PHARYNGOSTOMY;  Surgeon: Alexis Barraza MD;  [facility]     PHARYNGOSTOMY N/A 6/22/2016    Procedure: PHARYNGOSTOMY;  Surgeon: Alexis Barraza MD;  [facility]     PHARYNGOSTOMY N/A 6/29/2016    Procedure: PHARYNGOSTOMY;  Surgeon: Jens Wise MD;  [facility]     PHARYNGOSTOMY N/A 4/21/2017    Procedure: PHARYNGOSTOMY;;  Surgeon: Jens Wise MD;  [facility]     PHARYNGOSTOMY Left 5/31/2017    Procedure: PHARYNGOSTOMY;;  Surgeon: Nalini Barreto MD;  [facility]     PICC INSERTION Left 8/25/2016    5fr DL BioFlo PICC, 42cm (3cm external) in the L medial brachial vein w/ tip in the SVC RA junction.     PICC INSERTION - Rewire Right 05/31/2017    5fr DL BonaverdeFlo PICC, 40cm (6cm  "external) in the R medial brachial vein w/ tip in the SVC RA junction.     THORACOTOMY Right 1998    lung infection - \"hard crust formed on lung\"     THORACOTOMY Left 1/9/2017    Procedure: THORACOTOMY;  Surgeon: Jens Wise MD;  [facility]     WRIST SURGERY         Family History   Problem Relation Age of Onset     Alzheimer Disease Mother      CEREBROVASCULAR DISEASE Father      cerebral hemorrhage     Ovarian Cancer Sister      Breast Cancer Daughter        Social History   Substance Use Topics     Smoking status: Former Smoker     Packs/day: 1.00     Years: 15.00     Types: Cigarettes     Quit date: 1/1/1998     Smokeless tobacco: Never Used     Alcohol use No       No current facility-administered medications for this encounter.      Current Outpatient Prescriptions   Medication     oxyCODONE (ROXICODONE) 5 MG IR tablet     oxyCODONE (ROXICODONE) 10 MG IR tablet     MELATONIN PO     UNABLE TO FIND     acetaminophen (TYLENOL) 32 mg/mL solution     diphenoxylate-atropine (LOMOTIL) 2.5-0.025 MG per tablet     pantoprazole (PROTONIX) 40 MG EC tablet     diphenoxylate-atropine (LOMOTIL) 2.5-0.025 MG/5ML liquid     ferrous sulfate 300 (60 FE) MG/5ML syrup     clotrimazole (LOTRIMIN) 1 % cream     chlorhexidine (PERIDEX) 0.12 % solution     gabapentin (NEURONTIN) 250 MG/5ML solution     chlorhexidine (HIBICLENS) 4 % liquid     fiber modular (NUTRISOURCE FIBER) packet     traZODone (DESYREL) 100 MG tablet     order for DME     order for DME     DiphenhydrAMINE HCl (BENADRYL PO)     cholestyramine (QUESTRAN) 4 G Packet     multivitamins with minerals (CERTAVITE/CEROVITE) LIQD liquid     loperamide (IMODIUM) 1 MG/5ML liquid     Lactobacillus Rhamnosus, GG, (CULTURELLE PO)        Allergies   Allergen Reactions     Amoxicillin Diarrhea     Ativan [Lorazepam] Other (See Comments)     Hallucinations     Hydromorphone Itching     4/12/17 - patient open to using this as she tolerated Hydromorphone PCA during " hospitalization in January 2017.      Morphine Itching       Review of Systems   Constitutional: Negative for fever.   Respiratory: Negative for shortness of breath.    Cardiovascular: Negative for chest pain.   Gastrointestinal: Positive for abdominal pain (LLQ around J-tube entry site).   All other systems reviewed and are negative.      Physical Exam   BP: 145/72  Heart Rate: 92  Temp: 98.5  F (36.9  C)  Resp: 17  Height: 152.4 cm (5')  SpO2: 97 %  Physical Exam   Constitutional: She is oriented to person, place, and time. No distress.   HENT:   Head: Normocephalic and atraumatic.   Eyes: Conjunctivae are normal. Pupils are equal, round, and reactive to light.   Neck: Normal range of motion. Neck supple.   Cardiovascular: Normal rate and intact distal pulses.    Pulmonary/Chest: Effort normal. No respiratory distress. She has no wheezes. She has no rales.   Abdominal: Soft. There is no tenderness. There is no rebound and no guarding.   GJ tube in place, some mild fullness, firmness superior to the ostomy site.  No erythema or discharge   Musculoskeletal: Normal range of motion.   Neurological: She is alert and oriented to person, place, and time. No cranial nerve deficit. She exhibits normal muscle tone. Coordination normal.   Skin: Skin is warm and dry. No rash noted. She is not diaphoretic.   Psychiatric: She has a normal mood and affect. Her behavior is normal. Thought content normal.   Nursing note and vitals reviewed.      ED Course   12:57 AM  The patient was seen and examined by Dr. Fraser in Room 14.     ED Course     Procedures             Critical Care time:  none             Labs Ordered and Resulted from Time of ED Arrival Up to the Time of Departure from the ED - No data to display         Assessments & Plan (with Medical Decision Making)   1.  Pain around GJ feeding tube    71 yo F who presents with pain and fullness around her GJ feeding tube.  Xray shows the GJ tube in proper position with no  leakage of contrast noted.  No evidence of infection on exam.   Pain improved with IM dilaudid.  Will have IR evaluate the feeding tube in the ER.     I have reviewed the nursing notes.    I have reviewed the findings, diagnosis, plan and need for follow up with the patient.    New Prescriptions    No medications on file       Final diagnoses:   None   Faisal BOUDREAUX, am serving as a trained medical scribe to document services personally performed by Hema Fraser MD, based on the provider's statements to me.   Hema BOUDREAUX MD, was physically present and have reviewed and verified the accuracy of this note documented by Faisal Daily.      9/3/2017   [facility], [place], EMERGENCY DEPARTMENT     Hema Fraser MD  09/04/17 0620

## 2017-09-04 NOTE — ED AVS SNAPSHOT
[facility], Emergency Department    [address] [address]    Phone:  [phone]                                       Minerva Blanco   MRN: [id number]    Department:  [facility], Emergency Department   Date of Visit:  9/3/2017           Patient Information     Date Of Birth          1945        Your diagnoses for this visit were:     Complication of feeding tube (H)        You were seen by Hema Fraser MD.        Discharge Instructions       Interventional Radiology will contact you tomorrow to schedule an evaluation of your feeding tube.  You may use it in the meantime.  Return if worsening pain.             24 Hour Appointment Hotline       To make an appointment at any [facility] clinic, call [phone] ([phone]). If you don't have a family doctor or clinic, we will help you find one. [facility] clinics are conveniently located to serve the needs of you and your family.             Review of your medicines      Our records show that you are taking the medicines listed below. If these are incorrect, please call your family doctor or clinic.        Dose / Directions Last dose taken    acetaminophen 32 mg/mL solution   Commonly known as:  TYLENOL   Dose:  975 mg   Indication:  Pain   Quantity:  300 mL        30.45 mLs (975 mg) by Per Feeding Tube route 3 times daily   Refills:  1        BENADRYL PO   Dose:  25 mg        Take 25 mg by mouth nightly as needed   Refills:  0        chlorhexidine 0.12 % solution   Commonly known as:  PERIDEX   Dose:  15 mL   Indication:  Tooth Plaque        Swish and spit 15 mLs in mouth 2 times daily   Refills:  0        chlorhexidine 4 % liquid   Commonly known as:  HIBICLENS   Quantity:  200 mL        Apply topically 2 times daily   Refills:  0        cholestyramine 4 G Packet   Commonly known as:  QUESTRAN   Dose:  1 packet        Take 1 packet by mouth daily   Refills:  0        clotrimazole 1 % cream   Commonly known as:  LOTRIMIN    Quantity:  12 g        Apply topically 2 times daily   Refills:  1        CULTURELLE PO   Dose:  1 tablet        Take 1 tablet by mouth 2 times daily   Refills:  0        * diphenoxylate-atropine 2.5-0.025 MG/5ML liquid   Commonly known as:  LOMOTIL   Dose:  5 mL   Quantity:  300 mL        Take 5 mLs by mouth 4 times daily as needed for diarrhea   Refills:  0        * diphenoxylate-atropine 2.5-0.025 MG per tablet   Commonly known as:  LOMOTIL   Dose:  1 tablet   Quantity:  40 tablet        Take 1 tablet by mouth 4 times daily as needed for diarrhea   Refills:  1        ferrous sulfate 300 (60 FE) MG/5ML syrup   Dose:  300 mg   Quantity:  150 mL        5 mLs (300 mg) by Per J Tube route daily   Refills:  0        fiber modular packet   Dose:  1 packet   Quantity:  60 packet        1 packet by Per Feeding Tube route 3 times daily (with meals)   Refills:  0        gabapentin 250 MG/5ML solution   Commonly known as:  NEURONTIN   Dose:  300 mg   Quantity:  550 mL        Take 6 mLs (300 mg) by mouth every 8 hours   Refills:  0        loperamide 1 MG/5ML liquid   Commonly known as:  IMODIUM   Dose:  4 mg   Quantity:  200 mL        Take 20 mLs (4 mg) by mouth 4 times daily as needed for diarrhea   Refills:  0        MELATONIN PO   Dose:  5 mg        Take 5 mg by mouth At Bedtime   Refills:  0        multivitamins with minerals Liqd liquid   Dose:  15 mL        Take 15 mLs by mouth daily   Refills:  0        * order for DME   Quantity:  1 Device        Equipment being ordered:  [facility] Suction Machine-Intermittent Suction Canisters(2) Suction Canister Holders(2) Suction Connect Tube(2) 5 in 1 Connector(2) Bacteria Filter(2) Yaunkauer Suction(2)  Treatment Diagnosis: Esophogeal Perforation, s/p esophagectomy   Refills:  0        * order for DME   Quantity:  1 Device        Equipment being ordered:  Suction Pump Suction Canister(2) Suction Tubing(2) Bacteria Filter(2) Yaunkauer(4) Red Rubber Catheter(2)  Treatment Diagnosis:  Esophogeal Perforation, s/p esophagectomy   Refills:  0        * oxyCODONE 10 MG IR tablet   Commonly known as:  ROXICODONE   Dose:  10 mg   Quantity:  20 tablet        Take 1 tablet (10 mg) by mouth every 6 hours as needed for moderate to severe pain   Refills:  0        * oxyCODONE 5 MG IR tablet   Commonly known as:  ROXICODONE   Dose:  5 mg   Quantity:  12 tablet        Take 1 tablet (5 mg) by mouth every 4 hours as needed for pain or other (Moderate to Severe)   Refills:  0        pantoprazole 40 MG EC tablet   Commonly known as:  PROTONIX   Quantity:  30 tablet        Take by mouth 30-60 minutes before a meal.   Refills:  0        traZODone 100 MG tablet   Commonly known as:  DESYREL   Dose:  50 mg   Quantity:  30 tablet        0.5 tablets (50 mg) by Per G Tube route At Bedtime   Refills:  0        UNABLE TO FIND        2 nell supplement 4 cans daily per g tube   Refills:  0        * Notice:  This list has 6 medication(s) that are the same as other medications prescribed for you. Read the directions carefully, and ask your doctor or other care provider to review them with you.            Procedures and tests performed during your visit     XR Abdomen 1 View      Orders Needing Specimen Collection     None      Pending Results     Date and Time Order Name Status Description    9/4/2017 0138 XR Abdomen 1 View Preliminary             Pending Culture Results     No orders found from 9/2/2017 to 9/5/2017.            Pending Results Instructions     If you had any lab results that were not finalized at the time of your Discharge, you can call the ED Lab Result RN at [phone]. You will be contacted by this team for any positive Lab results or changes in treatment. The nurses are available 7 days a week from 10A to 6:30P.  You can leave a message 24 hours per day and they will return your call.        Thank you for choosing [facility]       Thank you for choosing [facility] for your care. Our goal is always to provide  "you with excellent care. Hearing back from our patients is one way we can continue to improve our services. Please take a few minutes to complete the written survey that you may receive in the mail after you visit with us. Thank you!        [facility] Information     [facility] lets you send messages to your doctor, view your test results, renew your prescriptions, schedule appointments and more. To sign up, go to [email or web address] . Click on \"Log in\" on the left side of the screen, which will take you to the Welcome page. Then click on \"Sign up Now\" on the right side of the page.     You will be asked to enter the access code listed below, as well as some personal information. Please follow the directions to create your username and password.     Your access code is: [email or web address]  Expires: 2017 12:26 PM     Your access code will  in 90 days. If you need help or a new code, please call your [facility] clinic or [phone].        Care EveryWhere ID     This is your Care EveryWhere ID. This could be used by other organizations to access your [facility] medical records  [id number]        Equal Access to Services     TOM JORGENSEN : Hadii davion Meng, wagillian romero, qashira kaalmasana anderson, florentin saldivar. So [facility] [phone].    ATENCIÓN: Si habla español, tiene a jackson disposición servicios gratuitos de asistencia lingüística. Sreekanth al [phone].    We comply with applicable federal civil rights laws and [place] laws. We do not discriminate on the basis of race, color, national origin, age, disability sex, sexual orientation or gender identity.            After Visit Summary       This is your record. Keep this with you and show to your community pharmacist(s) and doctor(s) at your next visit.                  "

## 2017-09-04 NOTE — ED AVS SNAPSHOT
[facility], [place], Emergency Department    [address] [place] [id number]    Phone:  [phone]                                       Minerva Blanco   MRN: [id number]    Department:  [facility], Emergency Department   Date of Visit:  9/3/2017           After Visit Summary Signature Page     I have received my discharge instructions, and my questions have been answered. I have discussed any challenges I see with this plan with the nurse or doctor.    ..........................................................................................................................................  Patient/Patient Representative Signature      ..........................................................................................................................................  Patient Representative Print Name and Relationship to Patient    ..................................................               ................................................  Date                                            Time    ..........................................................................................................................................  Reviewed by Signature/Title    ...................................................              ..............................................  Date                                                            Time

## 2017-09-04 NOTE — DISCHARGE INSTRUCTIONS
Interventional Radiology will contact you tomorrow to schedule an evaluation of your feeding tube.  You may use it in the meantime.  Return if worsening pain.

## 2017-09-04 NOTE — ED NOTES
Swelling near feeding tube. No difficulty flushing the tube. Patient is unable to tolerate PO because she has a perforated esophagus. Noticed the swelling this evening. Area is tender to palpation.

## 2017-09-05 ENCOUNTER — TELEPHONE (OUTPATIENT)
Dept: OTHER | Facility: CLINIC | Age: 72
End: 2017-09-05

## 2017-09-05 ENCOUNTER — HOSPITAL ENCOUNTER (EMERGENCY)
Facility: CLINIC | Age: 72
Discharge: HOME | End: 2017-09-06
Attending: EMERGENCY MEDICINE | Admitting: EMERGENCY MEDICINE
Payer: MEDICARE

## 2017-09-05 DIAGNOSIS — R52 PAIN: Primary | ICD-10-CM

## 2017-09-05 DIAGNOSIS — Z78.9 ON TUBE FEEDING DIET: Primary | ICD-10-CM

## 2017-09-05 DIAGNOSIS — T85.528A JEJUNOSTOMY TUBE FELL OUT: ICD-10-CM

## 2017-09-05 PROCEDURE — 99156 MOD SED OTH PHYS/QHP 5/>YRS: CPT | Mod: Z6 | Performed by: EMERGENCY MEDICINE

## 2017-09-05 PROCEDURE — 49452 REPLACE G-J TUBE PERC: CPT | Performed by: EMERGENCY MEDICINE

## 2017-09-05 PROCEDURE — 99285 EMERGENCY DEPT VISIT HI MDM: CPT | Mod: 25 | Performed by: EMERGENCY MEDICINE

## 2017-09-05 PROCEDURE — 99207 ZZC APP CREDIT; MD BILLING SHARED VISIT: CPT | Mod: Z6 | Performed by: EMERGENCY MEDICINE

## 2017-09-05 PROCEDURE — 99284 EMERGENCY DEPT VISIT MOD MDM: CPT | Mod: 25 | Performed by: EMERGENCY MEDICINE

## 2017-09-05 PROCEDURE — 99156 MOD SED OTH PHYS/QHP 5/>YRS: CPT | Performed by: EMERGENCY MEDICINE

## 2017-09-05 NOTE — ED AVS SNAPSHOT
[facility], [place], Emergency Department    [address] [place] [address]    Phone:  [phone]                                       Minerva Blanco   MRN: [id number]    Department:  [facility], Emergency Department   Date of Visit:  9/5/2017           After Visit Summary Signature Page     I have received my discharge instructions, and my questions have been answered. I have discussed any challenges I see with this plan with the nurse or doctor.    ..........................................................................................................................................  Patient/Patient Representative Signature      ..........................................................................................................................................  Patient Representative Print Name and Relationship to Patient    ..................................................               ................................................  Date                                            Time    ..........................................................................................................................................  Reviewed by Signature/Title    ...................................................              ..............................................  Date                                                            Time

## 2017-09-05 NOTE — ED AVS SNAPSHOT
[facility], Emergency Department    [address] [facility] [address]    Phone:  [phone]                                       Minerva Blanco   MRN: [id number]    Department:  [facility], Emergency Department   Date of Visit:  9/5/2017           Patient Information     Date Of Birth          1945        Your diagnoses for this visit were:     Jejunostomy tube fell out        You were seen by Carlos Alonso MD and Beena Welsh MD.        Discharge Instructions       Follow-up as scheduled with interventional radiology.    Use tube as per usual.    Return to the emergency Department for any problems.    Future Appointments        Provider Department Dept Phone Center    9/8/2017 11:00 AM [facility]      24 Hour Appointment Hotline       To make an appointment at any [facility], call [phone] ([phone]). If you don't have a family doctor or clinic, we will help you find one. [facility] clinics are conveniently located to serve the needs of you and your family.             Review of your medicines      Our records show that you are taking the medicines listed below. If these are incorrect, please call your family doctor or clinic.        Dose / Directions Last dose taken    acetaminophen 32 mg/mL solution   Commonly known as:  TYLENOL   Dose:  975 mg   Indication:  Pain   Quantity:  300 mL        30.45 mLs (975 mg) by Per Feeding Tube route 3 times daily   Refills:  1        BENADRYL PO   Dose:  25 mg        Take 25 mg by mouth nightly as needed   Refills:  0        chlorhexidine 0.12 % solution   Commonly known as:  PERIDEX   Dose:  15 mL   Indication:  Tooth Plaque        Swish and spit 15 mLs in mouth 2 times daily   Refills:  0        chlorhexidine 4 % liquid   Commonly known as:  HIBICLENS   Quantity:  200 mL        Apply topically 2 times daily   Refills:  0        cholestyramine 4 G Packet   Commonly  known as:  QUESTRAN   Dose:  1 packet        Take 1 packet by mouth daily   Refills:  0        clotrimazole 1 % cream   Commonly known as:  LOTRIMIN   Quantity:  12 g        Apply topically 2 times daily   Refills:  1        CULTURELLE PO   Dose:  1 tablet        Take 1 tablet by mouth 2 times daily   Refills:  0        * diphenoxylate-atropine 2.5-0.025 MG/5ML liquid   Commonly known as:  LOMOTIL   Dose:  5 mL   Quantity:  300 mL        Take 5 mLs by mouth 4 times daily as needed for diarrhea   Refills:  0        * diphenoxylate-atropine 2.5-0.025 MG per tablet   Commonly known as:  LOMOTIL   Dose:  1 tablet   Quantity:  40 tablet        Take 1 tablet by mouth 4 times daily as needed for diarrhea   Refills:  1        ferrous sulfate 300 (60 FE) MG/5ML syrup   Dose:  300 mg   Quantity:  150 mL        5 mLs (300 mg) by Per J Tube route daily   Refills:  0        fiber modular packet   Dose:  1 packet   Quantity:  60 packet        1 packet by Per Feeding Tube route 3 times daily (with meals)   Refills:  0        gabapentin 250 MG/5ML solution   Commonly known as:  NEURONTIN   Dose:  300 mg   Quantity:  550 mL        Take 6 mLs (300 mg) by mouth every 8 hours   Refills:  0        loperamide 1 MG/5ML liquid   Commonly known as:  IMODIUM   Dose:  4 mg   Quantity:  200 mL        Take 20 mLs (4 mg) by mouth 4 times daily as needed for diarrhea   Refills:  0        MELATONIN PO   Dose:  5 mg        Take 5 mg by mouth At Bedtime   Refills:  0        multivitamins with minerals Liqd liquid   Dose:  15 mL        Take 15 mLs by mouth daily   Refills:  0        * order for DME   Quantity:  1 Device        Equipment being ordered:  [facility] Suction Machine-Intermittent Suction Canisters(2) Suction Canister Holders(2) Suction Connect Tube(2) 5 in 1 Connector(2) Bacteria Filter(2) Yaunkauer Suction(2)  Treatment Diagnosis: Esophogeal Perforation, s/p esophagectomy   Refills:  0        * order for DME   Quantity:  1 Device         Equipment being ordered:  Suction Pump Suction Canister(2) Suction Tubing(2) Bacteria Filter(2) Yaunkauer(4) Red Rubber Catheter(2)  Treatment Diagnosis: Esophogeal Perforation, s/p esophagectomy   Refills:  0        * oxyCODONE 10 MG IR tablet   Commonly known as:  ROXICODONE   Dose:  10 mg   Quantity:  20 tablet        Take 1 tablet (10 mg) by mouth every 6 hours as needed for moderate to severe pain   Refills:  0        * oxyCODONE 5 MG IR tablet   Commonly known as:  ROXICODONE   Dose:  5 mg   Quantity:  12 tablet        Take 1 tablet (5 mg) by mouth every 4 hours as needed for pain or other (Moderate to Severe)   Refills:  0        pantoprazole 40 MG EC tablet   Commonly known as:  PROTONIX   Quantity:  30 tablet        Take by mouth 30-60 minutes before a meal.   Refills:  0        traZODone 100 MG tablet   Commonly known as:  DESYREL   Dose:  50 mg   Quantity:  30 tablet        0.5 tablets (50 mg) by Per G Tube route At Bedtime   Refills:  0        UNABLE TO FIND        2 nell supplement 4 cans daily per g tube   Refills:  0        * Notice:  This list has 6 medication(s) that are the same as other medications prescribed for you. Read the directions carefully, and ask your doctor or other care provider to review them with you.            Procedures and tests performed during your visit     [facility] Care Adult IP Consult    XR Abdomen Port 1 View      Orders Needing Specimen Collection     None      Pending Results     Date and Time Order Name Status Description    9/6/2017 0151 XR Abdomen Port 1 View Preliminary             Pending Culture Results     No orders found for last 3 day(s).            Pending Results Instructions     If you had any lab results that were not finalized at the time of your Discharge, you can call the ED Lab Result RN at [phone]. You will be contacted by this team for any positive Lab results or changes in treatment. The nurses are available 7 days a week from 10A to  "6:30P.  You can leave a message 24 hours per day and they will return your call.        Thank you for choosing [facility]       Thank you for choosing [facility] for your care. Our goal is always to provide you with excellent care. Hearing back from our patients is one way we can continue to improve our services. Please take a few minutes to complete the written survey that you may receive in the mail after you visit with us. Thank you!        [employer] Information     [employer] lets you send messages to your doctor, view your test results, renew your prescriptions, schedule appointments and more. To sign up, go to [email or web address] . Click on \"Log in\" on the left side of the screen, which will take you to the Welcome page. Then click on \"Sign up Now\" on the right side of the page.     You will be asked to enter the access code listed below, as well as some personal information. Please follow the directions to create your username and password.     Your access code is: [email or web address]  Expires: 2017 12:26 PM     Your access code will  in 90 days. If you need help or a new code, please call your [facility] clinic or [phone].        Care EveryWhere ID     This is your Care EveryWhere ID. This could be used by other organizations to access your [facility] medical records  [id number]        Equal Access to Services     TOM JORGENSEN : Hadii davion morgano Sothiago, waaxda luqadaha, qaybta kaalmada monica, florentin saldivar. So [facility] [phone].    ATENCIÓN: Si habla español, tiene a jackson disposición servicios gratuitos de asistencia lingüística. Llame al [phone].    We comply with applicable federal civil rights laws and [place] laws. We do not discriminate on the basis of race, color, national origin, age, disability sex, sexual orientation or gender identity.            After Visit Summary       This is your record. Keep this with you and show to your community pharmacist(s) " and doctor(s) at your next visit.

## 2017-09-06 ENCOUNTER — HOSPITAL ENCOUNTER (OUTPATIENT)
Facility: CLINIC | Age: 72
Discharge: HOME | End: 2017-09-06
Attending: THORACIC SURGERY (CARDIOTHORACIC VASCULAR SURGERY) | Admitting: THORACIC SURGERY (CARDIOTHORACIC VASCULAR SURGERY)
Payer: MEDICARE

## 2017-09-06 ENCOUNTER — APPOINTMENT (OUTPATIENT)
Dept: MEDSURG UNIT | Facility: CLINIC | Age: 72
End: 2017-09-06
Attending: THORACIC SURGERY (CARDIOTHORACIC VASCULAR SURGERY)
Payer: MEDICARE

## 2017-09-06 ENCOUNTER — TELEPHONE (OUTPATIENT)
Dept: SURGERY | Facility: CLINIC | Age: 72
End: 2017-09-06

## 2017-09-06 ENCOUNTER — APPOINTMENT (OUTPATIENT)
Dept: INTERVENTIONAL RADIOLOGY/VASCULAR | Facility: CLINIC | Age: 72
End: 2017-09-06
Attending: NURSE PRACTITIONER
Payer: MEDICARE

## 2017-09-06 ENCOUNTER — APPOINTMENT (OUTPATIENT)
Dept: GENERAL RADIOLOGY | Facility: CLINIC | Age: 72
End: 2017-09-06
Attending: EMERGENCY MEDICINE
Payer: MEDICARE

## 2017-09-06 VITALS
WEIGHT: 88 LBS | DIASTOLIC BLOOD PRESSURE: 60 MMHG | RESPIRATION RATE: 15 BRPM | HEIGHT: 60 IN | HEART RATE: 66 BPM | BODY MASS INDEX: 17.28 KG/M2 | SYSTOLIC BLOOD PRESSURE: 118 MMHG | TEMPERATURE: 98 F | OXYGEN SATURATION: 96 %

## 2017-09-06 VITALS
DIASTOLIC BLOOD PRESSURE: 59 MMHG | HEART RATE: 60 BPM | RESPIRATION RATE: 16 BRPM | OXYGEN SATURATION: 99 % | TEMPERATURE: 97.8 F | SYSTOLIC BLOOD PRESSURE: 139 MMHG

## 2017-09-06 DIAGNOSIS — G89.29 OTHER CHRONIC PAIN: ICD-10-CM

## 2017-09-06 DIAGNOSIS — Z78.9 ON TUBE FEEDING DIET: Primary | ICD-10-CM

## 2017-09-06 DIAGNOSIS — Z78.9 ON TUBE FEEDING DIET: ICD-10-CM

## 2017-09-06 PROCEDURE — 25000128 H RX IP 250 OP 636

## 2017-09-06 PROCEDURE — 40000556 ZZH STATISTIC PERIPHERAL IV START W US GUIDANCE

## 2017-09-06 PROCEDURE — 40000986 XR ABDOMEN PORT F1 VW

## 2017-09-06 PROCEDURE — 25000128 H RX IP 250 OP 636: Performed by: EMERGENCY MEDICINE

## 2017-09-06 RX ORDER — HYDROMORPHONE HYDROCHLORIDE 1 MG/ML
0.5 INJECTION, SOLUTION INTRAMUSCULAR; INTRAVENOUS; SUBCUTANEOUS ONCE
Status: COMPLETED | OUTPATIENT
Start: 2017-09-06 | End: 2017-09-06

## 2017-09-06 RX ORDER — PROPOFOL 10 MG/ML
INJECTION, EMULSION INTRAVENOUS
Status: COMPLETED
Start: 2017-09-06 | End: 2017-09-06

## 2017-09-06 RX ORDER — OXYCODONE HYDROCHLORIDE 10 MG/1
10 TABLET ORAL EVERY 6 HOURS PRN
Qty: 64 TABLET | Refills: 0 | Status: SHIPPED | OUTPATIENT
Start: 2017-09-06 | End: 2017-09-06

## 2017-09-06 RX ORDER — SODIUM CHLORIDE 9 MG/ML
INJECTION, SOLUTION INTRAVENOUS CONTINUOUS
Status: DISCONTINUED | OUTPATIENT
Start: 2017-09-06 | End: 2017-09-06 | Stop reason: HOSPADM

## 2017-09-06 RX ORDER — CHLORHEXIDINE GLUCONATE 40 MG/ML
SOLUTION TOPICAL ONCE
Status: DISCONTINUED | OUTPATIENT
Start: 2017-09-06 | End: 2017-09-06 | Stop reason: HOSPADM

## 2017-09-06 RX ORDER — NALOXONE HYDROCHLORIDE 0.4 MG/ML
.1-.4 INJECTION, SOLUTION INTRAMUSCULAR; INTRAVENOUS; SUBCUTANEOUS
Status: DISCONTINUED | OUTPATIENT
Start: 2017-09-06 | End: 2017-09-06 | Stop reason: HOSPADM

## 2017-09-06 RX ORDER — FENTANYL CITRATE 50 UG/ML
25-50 INJECTION, SOLUTION INTRAMUSCULAR; INTRAVENOUS EVERY 5 MIN PRN
Status: DISCONTINUED | OUTPATIENT
Start: 2017-09-06 | End: 2017-09-06 | Stop reason: HOSPADM

## 2017-09-06 RX ORDER — OXYCODONE HYDROCHLORIDE 10 MG/1
10 TABLET ORAL EVERY 6 HOURS PRN
Qty: 64 TABLET | Refills: 0 | Status: ON HOLD | OUTPATIENT
Start: 2017-09-06 | End: 2017-10-20

## 2017-09-06 RX ORDER — IOPAMIDOL 510 MG/ML
50 INJECTION, SOLUTION INTRAVASCULAR ONCE
Status: COMPLETED | OUTPATIENT
Start: 2017-09-06 | End: 2017-09-06

## 2017-09-06 RX ORDER — FLUMAZENIL 0.1 MG/ML
0.2 INJECTION, SOLUTION INTRAVENOUS
Status: DISCONTINUED | OUTPATIENT
Start: 2017-09-06 | End: 2017-09-06 | Stop reason: HOSPADM

## 2017-09-06 RX ORDER — LIDOCAINE 40 MG/G
CREAM TOPICAL
Status: DISCONTINUED | OUTPATIENT
Start: 2017-09-06 | End: 2017-09-06 | Stop reason: HOSPADM

## 2017-09-06 RX ORDER — PROPOFOL 10 MG/ML
30 INJECTION, EMULSION INTRAVENOUS ONCE
Status: COMPLETED | OUTPATIENT
Start: 2017-09-06 | End: 2017-09-06

## 2017-09-06 RX ADMIN — PROPOFOL 30 MG: 10 INJECTION, EMULSION INTRAVENOUS at 01:42

## 2017-09-06 RX ADMIN — LIDOCAINE HYDROCHLORIDE 10 ML: 20 JELLY TOPICAL at 15:37

## 2017-09-06 RX ADMIN — HYDROMORPHONE HYDROCHLORIDE 0.5 MG: 1 INJECTION, SOLUTION INTRAMUSCULAR; INTRAVENOUS; SUBCUTANEOUS at 01:31

## 2017-09-06 RX ADMIN — SODIUM CHLORIDE: 9 INJECTION, SOLUTION INTRAVENOUS at 14:16

## 2017-09-06 RX ADMIN — FENTANYL CITRATE 75 MCG: 50 INJECTION, SOLUTION INTRAMUSCULAR; INTRAVENOUS at 15:51

## 2017-09-06 RX ADMIN — MIDAZOLAM 1.5 MG: 1 INJECTION INTRAMUSCULAR; INTRAVENOUS at 15:50

## 2017-09-06 RX ADMIN — IOPAMIDOL 20 ML: 510 INJECTION, SOLUTION INTRAVASCULAR at 16:06

## 2017-09-06 NOTE — IP AVS SNAPSHOT
MRN:[id number]                      After Visit Summary   9/6/2017    Minerva Blanco    MRN: [id number]           Visit Information        Department      9/6/2017  1:17 PM [address] [facility]          Review of your medicines      UNREVIEWED medicines. Ask your doctor about these medicines        Dose / Directions    acetaminophen 32 mg/mL solution   Commonly known as:  TYLENOL   Indication:  Pain   Used for:  Esophageal perforation        Dose:  975 mg   30.45 mLs (975 mg) by Per Feeding Tube route 3 times daily   Quantity:  300 mL   Refills:  1       BENADRYL PO        Dose:  25 mg   Take 25 mg by mouth nightly as needed   Refills:  0       chlorhexidine 0.12 % solution   Commonly known as:  PERIDEX   Indication:  Tooth Plaque   Used for:  Esophageal perforation        Dose:  15 mL   Swish and spit 15 mLs in mouth 2 times daily   Refills:  0       chlorhexidine 4 % liquid   Commonly known as:  HIBICLENS   Used for:  History of esophagectomy        Apply topically 2 times daily   Quantity:  200 mL   Refills:  0       cholestyramine 4 G Packet   Commonly known as:  QUESTRAN        Dose:  1 packet   Take 1 packet by mouth daily   Refills:  0       clotrimazole 1 % cream   Commonly known as:  LOTRIMIN   Used for:  Wound abscess, subsequent encounter        Apply topically 2 times daily   Quantity:  12 g   Refills:  1       CULTURELLE PO        Dose:  1 tablet   Take 1 tablet by mouth 2 times daily   Refills:  0       * diphenoxylate-atropine 2.5-0.025 MG/5ML liquid   Commonly known as:  LOMOTIL   Used for:  Bowel habit changes        Dose:  5 mL   Take 5 mLs by mouth 4 times daily as needed for diarrhea   Quantity:  300 mL   Refills:  0       * diphenoxylate-atropine 2.5-0.025 MG per tablet   Commonly known as:  LOMOTIL   Used for:  Esophageal anastomotic leak        Dose:  1 tablet   Take 1 tablet by mouth 4 times daily as needed for diarrhea   Quantity:  40 tablet   Refills:  1       ferrous  sulfate 300 (60 FE) MG/5ML syrup   Used for:  Anemia due to other cause        Dose:  300 mg   5 mLs (300 mg) by Per J Tube route daily   Quantity:  150 mL   Refills:  0       fiber modular packet   Used for:  History of esophagectomy        Dose:  1 packet   1 packet by Per Feeding Tube route 3 times daily (with meals)   Quantity:  60 packet   Refills:  0       gabapentin 250 MG/5ML solution   Commonly known as:  NEURONTIN   Used for:  Acute post-operative pain        Dose:  300 mg   Take 6 mLs (300 mg) by mouth every 8 hours   Quantity:  550 mL   Refills:  0       loperamide 1 MG/5ML liquid   Commonly known as:  IMODIUM   Used for:  Bowel habit changes        Dose:  4 mg   Take 20 mLs (4 mg) by mouth 4 times daily as needed for diarrhea   Quantity:  200 mL   Refills:  0       MELATONIN PO        Dose:  5 mg   Take 5 mg by mouth At Bedtime   Refills:  0       multivitamins with minerals Liqd liquid        Dose:  15 mL   Take 15 mLs by mouth daily   Refills:  0       * oxyCODONE 5 MG IR tablet   Commonly known as:  ROXICODONE   This may have changed:  Another medication with the same name was added. Make sure you understand how and when to take each.   Used for:  Esophageal anastomotic leak        Dose:  5 mg   Take 1 tablet (5 mg) by mouth every 4 hours as needed for pain or other (Moderate to Severe)   Quantity:  12 tablet   Refills:  0       * oxyCODONE 10 MG IR tablet   Commonly known as:  ROXICODONE   This may have changed:  You were already taking a medication with the same name, and this prescription was added. Make sure you understand how and when to take each.   Used for:  Other chronic pain        Dose:  10 mg   Take 1 tablet (10 mg) by mouth every 6 hours as needed for moderate to severe pain   Quantity:  64 tablet   Refills:  0       pantoprazole 40 MG EC tablet   Commonly known as:  PROTONIX   Used for:  Gastroesophageal reflux disease, esophagitis presence not specified        Take by mouth 30-60  minutes before a meal.   Quantity:  30 tablet   Refills:  0       traZODone 100 MG tablet   Commonly known as:  DESYREL   Used for:  Insomnia, unspecified type        Dose:  50 mg   0.5 tablets (50 mg) by Per G Tube route At Bedtime   Quantity:  30 tablet   Refills:  0       UNABLE TO FIND        2 nell supplement 4 cans daily per g tube   Refills:  0       * Notice:  This list has 4 medication(s) that are the same as other medications prescribed for you. Read the directions carefully, and ask your doctor or other care provider to review them with you.      CONTINUE these medicines which have NOT CHANGED        Dose / Directions    * order for DME   Used for:  Hx of esophagectomy        Equipment being ordered:  [facility] Suction Machine-Intermittent Suction Canisters(2) Suction Canister Holders(2) Suction Connect Tube(2) 5 in 1 Connector(2) Bacteria Filter(2) Yaunkauer Suction(2)  Treatment Diagnosis: Esophogeal Perforation, s/p esophagectomy   Quantity:  1 Device   Refills:  0       * order for DME   Used for:  Esophageal perforation        Equipment being ordered:  Suction Pump Suction Canister(2) Suction Tubing(2) Bacteria Filter(2) Yaunkauer(4) Red Rubber Catheter(2)  Treatment Diagnosis: Esophogeal Perforation, s/p esophagectomy   Quantity:  1 Device   Refills:  0       * Notice:  This list has 2 medication(s) that are the same as other medications prescribed for you. Read the directions carefully, and ask your doctor or other care provider to review them with you.         Where to get your medicines      Some of these will need a paper prescription and others can be bought over the counter. Ask your nurse if you have questions.     Bring a paper prescription for each of these medications     oxyCODONE 10 MG IR tablet                Protect others around you: Learn how to safely use, store and throw away your medicines at www.disposemymeds.org.         Follow-ups after your visit        Your next 10 appointments  already scheduled     Sep 15, 2017   Procedure with Jens Wise MD   [facility] [place], Same Day Surgery (--)    [address]  [place] [address] [phone]               Care Instructions        After Care Instructions     Discharge Instructions       If questions or problems arise regarding tube function (e.g. leaking, dislodges, etc.) Contact Interventional Radiology department 24 hours a day.     For procedures that were done at the [facility] sites   8:00-4:30 PM Monday through Friday    Contact:[phone]    For afterhours and weekends call the [facility] main phone line   [phone] and ask for the [facility] IR on call physician number.    If DIRECTED by the RADIOLOGIST, related to specific problems with the tube functioning,  go to the Emergency Department.                  Further instructions from your care team       [facility]  Going Home after Sedation & J-Tube Exchange      For 24 hours:    An adult should stay with you.    Relax and take it easy.    DO NOT make any important legal decisions.    DO NOT drive or operate machines at home or at work.    Resume your regular diet and drink plenty of fluids.    CALL THE PHYSICIAN IF:    You develop nausea or vomiting    You develop hives or a rash or any unexplained itching    ADDITIONAL INSTRUCTIONS:  May restart tube feeding immediately.  Monitor J-tube site for any bleeding or other problems.    [facility] INTERVENTIONAL RADIOLOGY DEPARTMENT        Procedure Physician: Dr. Villareal                                Date of procedure:September 6, 2017 Wednesday        Telephone numbers:     [phone]      Monday-Friday 8:00 am to 4:30 pm                                              [phone]       After 4:30 pm Monday-Friday, Weekends .  Ask for the Interventional Radiologist on call.  Someone is on call                                                                            24/7.        [facility] toll free  "number: [phone]  Monday-Friday 8:00 am to 4:30 pm                                                                                                                                                                                                                        Additional Information About Your Visit        [employer] Connect Information     [employer] lets you send messages to your doctor, view your test results, renew your prescriptions, schedule appointments and more. To sign up, go to [email or web address] . Click on \"Log in\" on the left side of the screen, which will take you to the Welcome page. Then click on \"Sign up Now\" on the right side of the page.     You will be asked to enter the access code listed below, as well as some personal information. Please follow the directions to create your username and password.     Your access code is: [email or web address]  Expires: 2017 12:26 PM     Your access code will  in 90 days. If you need help or a new code, please call your [facility] clinic or [phone].        Care EveryWhere ID     This is your Care EveryWhere ID. This could be used by other organizations to access your [facility] medical records  [id number]        Your Vitals Were     Blood Pressure Pulse Temperature Respirations Pulse Oximetry       136/69 (BP Location: Left arm) 60 97.8  F (36.6  C) (Oral) 12 100%        Primary Care Provider Office Phone # Fax #    Andrea Nino [phone]      Equal Access to Services     [facility] Center[facility] : Hadii davion mckeon hadasho Soomaali, waaxda luqadaha, qaybta kaalmada florentin anderson . [facility] [phone].    ATENCIÓN: Si habla español, tiene a jackson disposición servicios gratuitos de asistencia lingüística. Sreekanth al [phone].    We comply with applicable federal civil rights laws and [place] laws. We do not discriminate on the basis of race, color, national origin, age, disability sex, sexual orientation or " gender identity.            Thank you!     Thank you for choosing [facility] for your care. Our goal is always to provide you with excellent care. Hearing back from our patients is one way we can continue to improve our services. Please take a few minutes to complete the written survey that you may receive in the mail after you visit with us. Thank you!             Medication List: This is a list of all your medications and when to take them. Check marks below indicate your daily home schedule. Keep this list as a reference.      Medications           Morning Afternoon Evening Bedtime As Needed    acetaminophen 32 mg/mL solution   Commonly known as:  TYLENOL   30.45 mLs (975 mg) by Per Feeding Tube route 3 times daily                                BENADRYL PO   Take 25 mg by mouth nightly as needed                                chlorhexidine 0.12 % solution   Commonly known as:  PERIDEX   Swish and spit 15 mLs in mouth 2 times daily                                chlorhexidine 4 % liquid   Commonly known as:  HIBICLENS   Apply topically 2 times daily                                cholestyramine 4 G Packet   Commonly known as:  QUESTRAN   Take 1 packet by mouth daily                                clotrimazole 1 % cream   Commonly known as:  LOTRIMIN   Apply topically 2 times daily                                CULTURELLE PO   Take 1 tablet by mouth 2 times daily                                * diphenoxylate-atropine 2.5-0.025 MG/5ML liquid   Commonly known as:  LOMOTIL   Take 5 mLs by mouth 4 times daily as needed for diarrhea                                * diphenoxylate-atropine 2.5-0.025 MG per tablet   Commonly known as:  LOMOTIL   Take 1 tablet by mouth 4 times daily as needed for diarrhea                                ferrous sulfate 300 (60 FE) MG/5ML syrup   5 mLs (300 mg) by Per J Tube route daily                                fiber modular packet   1 packet by Per Feeding Tube route 3 times daily  (with meals)                                gabapentin 250 MG/5ML solution   Commonly known as:  NEURONTIN   Take 6 mLs (300 mg) by mouth every 8 hours                                loperamide 1 MG/5ML liquid   Commonly known as:  IMODIUM   Take 20 mLs (4 mg) by mouth 4 times daily as needed for diarrhea                                MELATONIN PO   Take 5 mg by mouth At Bedtime                                multivitamins with minerals Liqd liquid   Take 15 mLs by mouth daily                                * order for DME   Equipment being ordered:  [facility] Suction Machine-Intermittent Suction Canisters(2) Suction Canister Holders(2) Suction Connect Tube(2) 5 in 1 Connector(2) Bacteria Filter(2) Yaunkauer Suction(2)  Treatment Diagnosis: Esophogeal Perforation, s/p esophagectomy                                * order for DME   Equipment being ordered:  Suction Pump Suction Canister(2) Suction Tubing(2) Bacteria Filter(2) Yaunkauer(4) Red Rubber Catheter(2)  Treatment Diagnosis: Esophogeal Perforation, s/p esophagectomy                                * oxyCODONE 5 MG IR tablet   Commonly known as:  ROXICODONE   Take 1 tablet (5 mg) by mouth every 4 hours as needed for pain or other (Moderate to Severe)                                * oxyCODONE 10 MG IR tablet   Commonly known as:  ROXICODONE   Take 1 tablet (10 mg) by mouth every 6 hours as needed for moderate to severe pain                                pantoprazole 40 MG EC tablet   Commonly known as:  PROTONIX   Take by mouth 30-60 minutes before a meal.                                traZODone 100 MG tablet   Commonly known as:  DESYREL   0.5 tablets (50 mg) by Per G Tube route At Bedtime                                UNABLE TO FIND   2 nell supplement 4 cans daily per g tube                                * Notice:  This list has 6 medication(s) that are the same as other medications prescribed for you. Read the directions carefully, and ask your doctor or  other care provider to review them with you.

## 2017-09-06 NOTE — PROGRESS NOTES
Pt admitted for a JT change.  PIV placed.  No labs ordered.  H and P up to date.  Pt needs consent.   at bedside.

## 2017-09-06 NOTE — ED PROVIDER NOTES
The patient was accepted at shift change the sign out with plan for me to check the tube check x-ray.  Radiologist feels that the contrast is going anteriorly through the small bowel.  The patient is thus discharged home, with instructions to follow up with interventional radiology.    Dictation Disclaimer: Some of this Note has been completed with voice-recognition dictation software. Although errors are generally corrected real-time, there is the potential for a rare error to be present in the completed chart.       Beena Welsh MD  09/06/17 0338

## 2017-09-06 NOTE — ED NOTES
Triage Assessment & Note:    /65  Pulse 72  Temp 98  F (36.7  C) (Oral)  Resp 18  Ht 1.524 m (5')  Wt 39.9 kg (88 lb)  SpO2 98%  BMI 17.19 kg/m2    Patient presents with:c/o feeding tube falling out. PT reports papin around site. Was scheduled to have a new feeding tube put in this coming Friday.     Home Treatments/Remedies: None    Febrile / Afebrile? Afebrile    Duration of C/o:  4hrs     Gregorio Sequeira  September 5, 2017

## 2017-09-06 NOTE — PROGRESS NOTES
Interventional Radiology Pre-Procedure Sedation Assessment   Time of Assessment: 2:45 PM    Expected Level: Moderate Sedation    Indication: Sedation is required for the following type of Procedure: GI    Sedation and procedural consent: Risks, benefits and alternatives were discussed with Patient    PO Intake: Appropriately NPO for procedure    ASA Class: Class 3 - SEVERE SYSTEMIC DISEASE, DEFINITE FUNCTIONAL LIMITATIONS.    Mallampati: Grade 3:  Soft palate visible, posterior pharyngeal wall not visible    Lungs: Lungs Clear with good breath sounds bilaterally    Heart: Normal heart sounds and rate    History and physical reviewed and no updates needed. I have reviewed the lab findings, diagnostic data, medications, and the plan for sedation. I have determined this patient to be an appropriate candidate for the planned sedation and procedure and have reassessed the patient IMMEDIATELY PRIOR to sedation and procedure.    Jd Oakley MD

## 2017-09-06 NOTE — PROGRESS NOTES
Interventional Radiology Intra-procedural Nursing Note    Patient Name: Minerva Blanco  Medical Record Number: [id number]  Today's Date: September 6, 2017    Start Time: 1536  End of procedure time: 1555  Procedure: jejunostomy tube change  Report given to: 2a RN  Time pt departs:  1602    Provider:  Dr Fuentes    Patient arrives to Ir 3 via cart from 2A, hover mat used to transfer patient to IR table after tube change.  Patient prepped and draped by onelia technologist, timeout completed.     Total of 1.5 mg versed given  Total of 75 mcg fentanyl given.    Patient transported back to 2a via cart.  Patient denies pain, vital signs stable.    Other Notes:     Robert Doss

## 2017-09-06 NOTE — TELEPHONE ENCOUNTER
09/05/2017    General Surgery Telephone Note    Was called by patient's  at 2230 regarding the patient's Jtube came out. Patient is doing well, but patient's  is wondering what they should do next.    I called the thoracic fellow and he recommended that the patient come to the ED and have the tube replaced and then have a tube study to ensure it is back in the jejunum    I called the patient's  back and informed him of the recommendation. He was frustrated that they came to the ED last night and spent many hours there and didn't have anything done. I apologized and explained that I could only tell him the recommendations of the fellow. He stated that he would bring the patient in to have the tube replaced.    Discussed with the Thoracic fellow.    Neri Lock MD  General Surgery PGY-3

## 2017-09-06 NOTE — IP AVS SNAPSHOT
[address] [address]                                       After Visit Summary   9/6/2017    Minerva Blanco    MRN: [id number]           After Visit Summary Signature Page     I have received my discharge instructions, and my questions have been answered. I have discussed any challenges I see with this plan with the nurse or doctor.    ..........................................................................................................................................  Patient/Patient Representative Signature      ..........................................................................................................................................  Patient Representative Print Name and Relationship to Patient    ..................................................               ................................................  Date                                            Time    ..........................................................................................................................................  Reviewed by Signature/Title    ...................................................              ..............................................  Date                                                            Time

## 2017-09-06 NOTE — DISCHARGE INSTRUCTIONS
Follow-up as scheduled with interventional radiology.    Use tube as per usual.    Return to the emergency Department for any problems.

## 2017-09-06 NOTE — PROGRESS NOTES
1600  Returned to 2A from IR per cart accompanied by RN.  S/P J-tube replacement.  Site at Left abd is FDI.  Denies any pain.  Pt does not take any nutrition orally.   at bedside.  1630  Resting, denies any needs.  1650  Discharge instructions reviewed with pt.  Verbally demonstrates understanding of instructions.  1700  DC to care of  per WC accompanied by staff.  CTRN

## 2017-09-06 NOTE — BRIEF OP NOTE
Interventional Radiology Brief Post Procedure Note    Procedure: Jejunostomy tube replacement    Proceduralist: Tess Villareal MD    Assistant: None    Time Out: Prior to the start of the procedure and with procedural staff participation, I verbally confirmed the patient s identity using two indicators, relevant allergies, that the procedure was appropriate and matched the consent or emergent situation, and that the correct equipment/implants were available. Immediately prior to starting the procedure I conducted the Time Out with the procedural staff and re-confirmed the patient s name, procedure, and site/side. (The Joint Commission universal protocol was followed.)  Yes        Sedation: IR Nurse Monitored Care   Post Procedure Summary:  Prior to the start of the procedure and with procedural staff participation, I verbally confirmed the patient s identity using two indicators, relevant allergies, that the procedure was appropriate and matched the consent or emergent situation, and that the correct equipment/implants were available. Immediately prior to starting the procedure I conducted the Time Out with the procedural staff and re-confirmed the patient s name, procedure, and site/side. (The Joint Commission universal protocol was followed.)  Yes       Sedatives: Fentanyl and Midazolam (Versed)    Vital signs, airway and pulse oximetry were monitored and remained stable throughout the procedure and sedation was maintained until the procedure was complete.  The patient was monitored by staff until sedation discharge criteria were met.    Patient tolerance: Patient tolerated the procedure well with no immediate complications.    Time of sedation in minutes: 30 Minutes minutes from beginning to end of physician one to one monitoring.          Findings: direct J tube replaced- 14 Fr x 30 cm J    Estimated Blood Loss: Minimal    Fluoroscopy Time:  minute(s)    SPECIMENS: None    Complications: 1. None      Condition: Stable    Plan:   J tube ready to use    Comments: See dictated procedure note for full details.    Tess Villareal MD

## 2017-09-06 NOTE — ED PROVIDER NOTES
History     Chief Complaint   Patient presents with     Feeding Problems     HPI  Minerva Blanco is a 72 year old female with a history of MS, IBS, atrial fibrillation, GERD, esophagectomy, with J tube who presents to the Emergency Department via for evaluation of J tube problem. Patient reports that her J tube fell out around 7:30 PM today. She denies a break in the tube with this. Patient was seen here yesterday for pain and swelling at her J tube site.      I have reviewed the Medications, Allergies, Past Medical and Surgical History, and Social History in the Epic system.    Review of Systems   Constitutional: Negative for fever.   HENT: Negative for congestion.    Eyes: Negative for redness.   Respiratory: Negative for shortness of breath.    Cardiovascular: Negative for chest pain.   Gastrointestinal: Negative for abdominal pain.        J tube fell out   Genitourinary: Negative for difficulty urinating.   Musculoskeletal: Negative for arthralgias and neck stiffness.   Skin: Negative for color change.   Neurological: Negative for headaches.   Psychiatric/Behavioral: Negative for confusion.   All other systems reviewed and are negative.      Physical Exam   BP: 128/65  Pulse: 72  Temp: 98  F (36.7  C)  Resp: 18  Height: 152.4 cm (5')  Weight: 39.9 kg (88 lb)  SpO2: 98 %  Physical Exam   Constitutional: She is oriented to person, place, and time. Vital signs are normal. She appears well-developed and well-nourished.  Non-toxic appearance. She does not appear ill. No distress.   HENT:   Head: Normocephalic and atraumatic.   Mouth/Throat: Oropharynx is clear and moist. No oropharyngeal exudate.   Eyes: Conjunctivae and EOM are normal. Pupils are equal, round, and reactive to light. No scleral icterus.   Neck: Normal range of motion. Neck supple. No JVD present. No tracheal deviation present. No thyromegaly present.   Cardiovascular: Normal rate, regular rhythm, normal heart sounds and intact distal pulses.   Exam reveals no gallop and no friction rub.    No murmur heard.  Pulmonary/Chest: Effort normal and breath sounds normal. No respiratory distress.   Abdominal: Soft. Bowel sounds are normal. She exhibits no distension and no mass. There is no tenderness. There is no rebound and no guarding.   jejunostomy site without bleeding, erythema, or swelling.   Musculoskeletal: Normal range of motion. She exhibits no edema or tenderness.   Lymphadenopathy:     She has no cervical adenopathy.   Neurological: She is alert and oriented to person, place, and time. She has normal strength. No cranial nerve deficit or sensory deficit.   Skin: Skin is warm and dry. No rash noted. No erythema. No pallor.   Psychiatric: She has a normal mood and affect. Her behavior is normal.   Nursing note and vitals reviewed.      ED Course   11:34 PM  The patient was seen and examined by Markus Alonso MD in Room 14.     ED Course     Procedures         [facility] Procedure Note        Sedation:      Performed by: Carlos Alonso  Authorized by: Carlos Alonso    Pre-Procedure Assessment done at 0130.    Expected Level:  Minimal Sedation    Indication:  Sedation is required to allow for Jejunostomy tube replacement    Consent obtained from patient after discussing the risks, benefits and alternatives.    PO Intake:  Appropriately NPO for procedure    ASA Class:  Class 3 - SEVERE SYSTEMIC DISEASE, DEFINITE FUNCTIONAL LIMITATIONS.    Mallampati:  Grade 2:  Soft palate, base of uvula, tonsillar pillars, and portion of posterior pharyngeal wall visible    Lungs: Lungs Clear with good breath sounds bilaterally.     Heart: Normal heart sounds and rate    History and physical reviewed and no updates needed. I have reviewed the lab findings, diagnostic data, medications, and the plan for sedation. I have determined this patient to be an appropriate candidate for the planned sedation and procedure and have reassessed the patient  IMMEDIATELY PRIOR to sedation and procedure.      Sedation Post Procedure Summary:    Prior to the start of the procedure and with procedural staff participation, I verbally confirmed the patient s identity using two indicators, relevant allergies, that the procedure was appropriate and matched the consent or emergent situation, and that the correct equipment/implants were available. Immediately prior to starting the procedure I conducted the Time Out with the procedural staff and re-confirmed the patient s name, procedure, and site/side. (The Joint Commission universal protocol was followed.)  Yes      Sedatives: Propofol    Vital signs, airway, End Tidal CO2 and pulse oximetry were monitored and remained stable throughout the procedure and sedation was maintained until the procedure was complete.  The patient was monitored by staff until sedation discharge criteria were met.    Patient tolerance: Patient tolerated the procedure well with no immediate complications.    Time of sedation in minutes:  10 minutes from beginning to end of physician one to one monitoring.    Results for orders placed or performed during the hospital encounter of 09/05/17   XR Abdomen Port 1 View    Narrative    XR ABDOMEN PORT F1 VW  9/6/2017 3:12 AM      HISTORY: jejunostomy tube replaced, tube check with gastroview  contrast    COMPARISON: 9/4/2017    FINDINGS: Jejunostomy tube projects over the left lower quadrant.  Contrast layers within the small bowel. Esophageal stent. Mediastinal  surgical clips. Bilateral pleural effusions. Nonobstructive bowel gas  pattern. No acute osseous abnormalities. Levoscoliosis of the distal  lumbar spine, which may be positional.      Impression    IMPRESSION:   1. Left lower quadrant jejunostomy tube appears patent, with enteric  contrast passing through the small bowel.  2. Nonobstructive bowel gas pattern.  3. Bilateral pleural effusions.    I have personally reviewed the examination and initial  interpretation  and I agree with the findings.    SABA PAT MD            Assessments & Plan (with Medical Decision Making)     This patient presented to the emergency department for replacement of jejunostomy tube. She had been referred in by surgery. They evaluated the patient in the emergency department and replaced the jejunostomy tube while I supervised conscious sedation of the patient. Tube check study was ordered and patient will be discharged to home if tube is in the correct place.    I have reviewed the nursing notes.    I have reviewed the findings, diagnosis, plan and need for follow up with the patient.    Discharge Medication List as of 9/6/2017  3:38 AM          Final diagnoses:   Jejunostomy tube fell out   INatacha, am serving as a trained medical scribe to document services personally performed by Markus Alonso MD, based on the provider's statements to me.      I, Markus Alonso MD, was physically present and have reviewed and verified the accuracy of this note documented by Natacha Ghosh.       9/5/2017   [facility], [place], EMERGENCY DEPARTMENT     Carlos Alonso MD  09/16/17 0849

## 2017-09-06 NOTE — TELEPHONE ENCOUNTER
Minerva is currently having her GJ tube replaced in IR. Spoke with Enrique about Minerva's pain medication. I will prescribe enough for 2 weeks. We will work on getting Minerva an appointment with the [facility] or [facility] to take over pain management needs. If she runs out of pain medication too soon, a refill will NOT be given early. She will have to report to the ER for pain management in that instance.

## 2017-09-06 NOTE — PROGRESS NOTES
Thoracic surgery progress note    Ms. Blanco presents after her J tube fell out around 7:30-8p tonight. She was having leakage from the site and was advised to come to the ED. She is otherwise doing fine.     /65  Pulse 72  Temp 98  F (36.7  C) (Oral)  Resp 18  Ht 1.524 m (5')  Wt 39.9 kg (88 lb)  SpO2 98%  BMI 17.19 kg/m2  Awake and alert  RRR  Non labored breathing  Abd soft, non tender. Jtube site clean without surrounding redness or warmth.     10 Fr red seth was inserted through the jtube stoma without resistance. It was sutured in place with 3-0 nylon.    Tube study was performed and demonstrated that the tube was intraluminal and there was not extraluminal contrast.    Patient will plan to discharge home tonight. She may resume use of her J tube for tube feeds and enteral medications. She has an appointment with IR on Friday already. Will have IR exchange the red seth for a formal jejunostomy tube at that time.    Discussed with Dr. Ace Lock MD  General Surgery PGY-3

## 2017-09-06 NOTE — DISCHARGE INSTRUCTIONS
[facility]  Going Home after Sedation & J-Tube Exchange      For 24 hours:    An adult should stay with you.    Relax and take it easy.    DO NOT make any important legal decisions.    DO NOT drive or operate machines at home or at work.    Resume your regular diet and drink plenty of fluids.    CALL THE PHYSICIAN IF:    You develop nausea or vomiting    You develop hives or a rash or any unexplained itching    ADDITIONAL INSTRUCTIONS:  May restart tube feeding immediately.  Monitor J-tube site for any bleeding or other problems.    [facility] INTERVENTIONAL RADIOLOGY DEPARTMENT        Procedure Physician: Dr. Villareal                                Date of procedure:September 6, 2017 Wednesday        Telephone numbers:     [phone]      Monday-Friday 8:00 am to 4:30 pm                                              [phone]       After 4:30 pm Monday-Friday, Weekends .  Ask for the Interventional Radiologist on call.  Someone is on call                                                                            24/7.        [facility] toll free number: [phone]  Monday-Friday 8:00 am to 4:30 pm

## 2017-09-07 DIAGNOSIS — G89.28 CHRONIC POST-OPERATIVE PAIN: Primary | ICD-10-CM

## 2017-09-15 ENCOUNTER — ANESTHESIA (OUTPATIENT)
Dept: SURGERY | Facility: CLINIC | Age: 72
End: 2017-09-15
Payer: MEDICARE

## 2017-09-15 ENCOUNTER — ANESTHESIA EVENT (OUTPATIENT)
Dept: SURGERY | Facility: CLINIC | Age: 72
End: 2017-09-15
Payer: MEDICARE

## 2017-09-15 ENCOUNTER — APPOINTMENT (OUTPATIENT)
Dept: GENERAL RADIOLOGY | Facility: CLINIC | Age: 72
End: 2017-09-15
Attending: THORACIC SURGERY (CARDIOTHORACIC VASCULAR SURGERY)
Payer: MEDICARE

## 2017-09-15 ENCOUNTER — SURGERY (OUTPATIENT)
Age: 72
End: 2017-09-15

## 2017-09-15 ENCOUNTER — HOSPITAL ENCOUNTER (OUTPATIENT)
Facility: CLINIC | Age: 72
Discharge: HOME | End: 2017-09-15
Attending: THORACIC SURGERY (CARDIOTHORACIC VASCULAR SURGERY) | Admitting: THORACIC SURGERY (CARDIOTHORACIC VASCULAR SURGERY)
Payer: MEDICARE

## 2017-09-15 VITALS
DIASTOLIC BLOOD PRESSURE: 63 MMHG | HEIGHT: 60 IN | BODY MASS INDEX: 18.18 KG/M2 | OXYGEN SATURATION: 95 % | SYSTOLIC BLOOD PRESSURE: 100 MMHG | TEMPERATURE: 97.8 F | RESPIRATION RATE: 16 BRPM | HEART RATE: 69 BPM | WEIGHT: 92.59 LBS

## 2017-09-15 DIAGNOSIS — K91.89 ESOPHAGEAL ANASTOMOTIC LEAK: ICD-10-CM

## 2017-09-15 LAB
CREAT SERPL-MCNC: 0.5 MG/DL (ref 0.52–1.04)
GFR SERPL CREATININE-BSD FRML MDRD: >90 ML/MIN/1.7M2
HGB BLD-MCNC: 10.5 G/DL (ref 11.7–15.7)
INR PPP: 0.98 (ref 0.86–1.14)
POTASSIUM SERPL-SCNC: 4.1 MMOL/L (ref 3.4–5.3)

## 2017-09-15 PROCEDURE — 25000128 H RX IP 250 OP 636: Performed by: ANESTHESIOLOGY

## 2017-09-15 PROCEDURE — 40000277 XR SURGERY CARM FLUORO LESS THAN 5 MIN W STILLS: Mod: TC

## 2017-09-15 PROCEDURE — 25000566 ZZH SEVOFLURANE, EA 15 MIN: Performed by: THORACIC SURGERY (CARDIOTHORACIC VASCULAR SURGERY)

## 2017-09-15 PROCEDURE — 82565 ASSAY OF CREATININE: CPT | Performed by: ANESTHESIOLOGY

## 2017-09-15 PROCEDURE — 37000008 ZZH ANESTHESIA TECHNICAL FEE, 1ST 30 MIN: Performed by: THORACIC SURGERY (CARDIOTHORACIC VASCULAR SURGERY)

## 2017-09-15 PROCEDURE — C9399 UNCLASSIFIED DRUGS OR BIOLOG: HCPCS | Performed by: NURSE ANESTHETIST, CERTIFIED REGISTERED

## 2017-09-15 PROCEDURE — 71000027 ZZH RECOVERY PHASE 2 EACH 15 MINS: Performed by: THORACIC SURGERY (CARDIOTHORACIC VASCULAR SURGERY)

## 2017-09-15 PROCEDURE — C1874 STENT, COATED/COV W/DEL SYS: HCPCS | Performed by: THORACIC SURGERY (CARDIOTHORACIC VASCULAR SURGERY)

## 2017-09-15 PROCEDURE — 40000170 ZZH STATISTIC PRE-PROCEDURE ASSESSMENT II: Performed by: THORACIC SURGERY (CARDIOTHORACIC VASCULAR SURGERY)

## 2017-09-15 PROCEDURE — 37000009 ZZH ANESTHESIA TECHNICAL FEE, EACH ADDTL 15 MIN: Performed by: THORACIC SURGERY (CARDIOTHORACIC VASCULAR SURGERY)

## 2017-09-15 PROCEDURE — 88300 SURGICAL PATH GROSS: CPT | Performed by: THORACIC SURGERY (CARDIOTHORACIC VASCULAR SURGERY)

## 2017-09-15 PROCEDURE — 36000070 ZZH SURGERY LEVEL 5 EA 15 ADDTL MIN - UMMC: Performed by: THORACIC SURGERY (CARDIOTHORACIC VASCULAR SURGERY)

## 2017-09-15 PROCEDURE — 25000125 ZZHC RX 250: Performed by: NURSE ANESTHETIST, CERTIFIED REGISTERED

## 2017-09-15 PROCEDURE — 85610 PROTHROMBIN TIME: CPT | Performed by: ANESTHESIOLOGY

## 2017-09-15 PROCEDURE — 36415 COLL VENOUS BLD VENIPUNCTURE: CPT | Performed by: ANESTHESIOLOGY

## 2017-09-15 PROCEDURE — 36000072 ZZH SURGERY LEVEL 5 W FLUORO 1ST 30 MIN - UMMC: Performed by: THORACIC SURGERY (CARDIOTHORACIC VASCULAR SURGERY)

## 2017-09-15 PROCEDURE — 25000128 H RX IP 250 OP 636: Performed by: NURSE ANESTHETIST, CERTIFIED REGISTERED

## 2017-09-15 PROCEDURE — 27210794 ZZH OR GENERAL SUPPLY STERILE: Performed by: THORACIC SURGERY (CARDIOTHORACIC VASCULAR SURGERY)

## 2017-09-15 PROCEDURE — 71000014 ZZH RECOVERY PHASE 1 LEVEL 2 FIRST HR: Performed by: THORACIC SURGERY (CARDIOTHORACIC VASCULAR SURGERY)

## 2017-09-15 PROCEDURE — 84132 ASSAY OF SERUM POTASSIUM: CPT | Performed by: ANESTHESIOLOGY

## 2017-09-15 PROCEDURE — 85018 HEMOGLOBIN: CPT | Performed by: ANESTHESIOLOGY

## 2017-09-15 PROCEDURE — C1769 GUIDE WIRE: HCPCS | Performed by: THORACIC SURGERY (CARDIOTHORACIC VASCULAR SURGERY)

## 2017-09-15 DEVICE — STENT ESOPHAGEAL ENDOMAXX 19X100MM MAXX-1910
Type: IMPLANTABLE DEVICE | Site: ESOPHAGUS | Status: NON-FUNCTIONAL
Removed: 2017-10-02

## 2017-09-15 RX ORDER — SODIUM CHLORIDE, SODIUM LACTATE, POTASSIUM CHLORIDE, CALCIUM CHLORIDE 600; 310; 30; 20 MG/100ML; MG/100ML; MG/100ML; MG/100ML
INJECTION, SOLUTION INTRAVENOUS CONTINUOUS
Status: DISCONTINUED | OUTPATIENT
Start: 2017-09-15 | End: 2017-09-26 | Stop reason: HOSPADM

## 2017-09-15 RX ORDER — NALOXONE HYDROCHLORIDE 0.4 MG/ML
.1-.4 INJECTION, SOLUTION INTRAMUSCULAR; INTRAVENOUS; SUBCUTANEOUS
Status: ACTIVE | OUTPATIENT
Start: 2017-09-15 | End: 2017-09-16

## 2017-09-15 RX ORDER — CHLORHEXIDINE GLUCONATE 40 MG/ML
SOLUTION TOPICAL ONCE
Status: DISCONTINUED | OUTPATIENT
Start: 2017-09-15 | End: 2017-09-15 | Stop reason: HOSPADM

## 2017-09-15 RX ORDER — OXYCODONE HYDROCHLORIDE 5 MG/1
5 TABLET ORAL EVERY 4 HOURS PRN
Qty: 20 TABLET | Refills: 0 | Status: SHIPPED | OUTPATIENT
Start: 2017-09-15 | End: 2017-09-20

## 2017-09-15 RX ORDER — SODIUM CHLORIDE, SODIUM LACTATE, POTASSIUM CHLORIDE, CALCIUM CHLORIDE 600; 310; 30; 20 MG/100ML; MG/100ML; MG/100ML; MG/100ML
INJECTION, SOLUTION INTRAVENOUS CONTINUOUS PRN
Status: DISCONTINUED | OUTPATIENT
Start: 2017-09-15 | End: 2017-09-15

## 2017-09-15 RX ORDER — ONDANSETRON 4 MG/1
4 TABLET, ORALLY DISINTEGRATING ORAL EVERY 30 MIN PRN
Status: DISCONTINUED | OUTPATIENT
Start: 2017-09-15 | End: 2017-09-26 | Stop reason: HOSPADM

## 2017-09-15 RX ORDER — HYDROMORPHONE HYDROCHLORIDE 1 MG/ML
.3-.5 INJECTION, SOLUTION INTRAMUSCULAR; INTRAVENOUS; SUBCUTANEOUS EVERY 10 MIN PRN
Status: DISCONTINUED | OUTPATIENT
Start: 2017-09-15 | End: 2017-09-26 | Stop reason: HOSPADM

## 2017-09-15 RX ORDER — PROPOFOL 10 MG/ML
INJECTION, EMULSION INTRAVENOUS PRN
Status: DISCONTINUED | OUTPATIENT
Start: 2017-09-15 | End: 2017-09-15

## 2017-09-15 RX ORDER — MEPERIDINE HYDROCHLORIDE 25 MG/ML
12.5 INJECTION INTRAMUSCULAR; INTRAVENOUS; SUBCUTANEOUS
Status: DISCONTINUED | OUTPATIENT
Start: 2017-09-15 | End: 2017-09-26 | Stop reason: HOSPADM

## 2017-09-15 RX ORDER — ONDANSETRON 2 MG/ML
INJECTION INTRAMUSCULAR; INTRAVENOUS PRN
Status: DISCONTINUED | OUTPATIENT
Start: 2017-09-15 | End: 2017-09-15

## 2017-09-15 RX ORDER — ONDANSETRON 4 MG/1
4 TABLET, ORALLY DISINTEGRATING ORAL EVERY 6 HOURS PRN
Qty: 40 TABLET | Refills: 1 | Status: SHIPPED | OUTPATIENT
Start: 2017-09-15 | End: 2017-11-15

## 2017-09-15 RX ORDER — FENTANYL CITRATE 50 UG/ML
25-50 INJECTION, SOLUTION INTRAMUSCULAR; INTRAVENOUS EVERY 5 MIN PRN
Status: DISCONTINUED | OUTPATIENT
Start: 2017-09-15 | End: 2017-09-26 | Stop reason: HOSPADM

## 2017-09-15 RX ORDER — METOPROLOL TARTRATE 1 MG/ML
1-2 INJECTION, SOLUTION INTRAVENOUS EVERY 5 MIN PRN
Status: DISCONTINUED | OUTPATIENT
Start: 2017-09-15 | End: 2017-09-26 | Stop reason: HOSPADM

## 2017-09-15 RX ORDER — LIDOCAINE HYDROCHLORIDE 20 MG/ML
INJECTION, SOLUTION INFILTRATION; PERINEURAL PRN
Status: DISCONTINUED | OUTPATIENT
Start: 2017-09-15 | End: 2017-09-15

## 2017-09-15 RX ORDER — ONDANSETRON 2 MG/ML
4 INJECTION INTRAMUSCULAR; INTRAVENOUS EVERY 30 MIN PRN
Status: DISCONTINUED | OUTPATIENT
Start: 2017-09-15 | End: 2017-09-26 | Stop reason: HOSPADM

## 2017-09-15 RX ORDER — FENTANYL CITRATE 50 UG/ML
INJECTION, SOLUTION INTRAMUSCULAR; INTRAVENOUS PRN
Status: DISCONTINUED | OUTPATIENT
Start: 2017-09-15 | End: 2017-09-15

## 2017-09-15 RX ADMIN — LIDOCAINE HYDROCHLORIDE 100 MG: 20 INJECTION, SOLUTION INFILTRATION; PERINEURAL at 07:39

## 2017-09-15 RX ADMIN — Medication 100 MG: at 07:39

## 2017-09-15 RX ADMIN — HYDROMORPHONE HYDROCHLORIDE 0.3 MG: 1 INJECTION, SOLUTION INTRAMUSCULAR; INTRAVENOUS; SUBCUTANEOUS at 09:22

## 2017-09-15 RX ADMIN — SODIUM CHLORIDE, POTASSIUM CHLORIDE, SODIUM LACTATE AND CALCIUM CHLORIDE: 600; 310; 30; 20 INJECTION, SOLUTION INTRAVENOUS at 08:50

## 2017-09-15 RX ADMIN — SUGAMMADEX 80 MG: 100 INJECTION, SOLUTION INTRAVENOUS at 08:30

## 2017-09-15 RX ADMIN — FENTANYL CITRATE 100 MCG: 50 INJECTION, SOLUTION INTRAMUSCULAR; INTRAVENOUS at 07:39

## 2017-09-15 RX ADMIN — FENTANYL CITRATE 25 MCG: 50 INJECTION, SOLUTION INTRAMUSCULAR; INTRAVENOUS at 09:19

## 2017-09-15 RX ADMIN — SODIUM CHLORIDE, POTASSIUM CHLORIDE, SODIUM LACTATE AND CALCIUM CHLORIDE: 600; 310; 30; 20 INJECTION, SOLUTION INTRAVENOUS at 07:31

## 2017-09-15 RX ADMIN — ROCURONIUM BROMIDE 20 MG: 10 INJECTION INTRAVENOUS at 07:52

## 2017-09-15 RX ADMIN — Medication: at 10:09

## 2017-09-15 RX ADMIN — PHENYLEPHRINE HYDROCHLORIDE 200 MCG: 10 INJECTION, SOLUTION INTRAMUSCULAR; INTRAVENOUS; SUBCUTANEOUS at 07:44

## 2017-09-15 RX ADMIN — FENTANYL CITRATE 25 MCG: 50 INJECTION, SOLUTION INTRAMUSCULAR; INTRAVENOUS at 09:14

## 2017-09-15 RX ADMIN — ONDANSETRON 4 MG: 2 INJECTION INTRAMUSCULAR; INTRAVENOUS at 08:30

## 2017-09-15 RX ADMIN — PROPOFOL 120 MG: 10 INJECTION, EMULSION INTRAVENOUS at 07:39

## 2017-09-15 RX ADMIN — MIDAZOLAM HYDROCHLORIDE 2 MG: 1 INJECTION, SOLUTION INTRAMUSCULAR; INTRAVENOUS at 07:25

## 2017-09-15 NOTE — DISCHARGE INSTRUCTIONS
[facility]  Same-Day Surgery   Adult Discharge Orders & Instructions     For 24 hours after surgery    1. Get plenty of rest.  A responsible adult must stay with you for at least 24 hours after you leave the hospital.   2. Do not drive or use heavy equipment.  If you have weakness or tingling, don't drive or use heavy equipment until this feeling goes away.  3. Do not drink alcohol.  4. Avoid strenuous or risky activities.  Ask for help when climbing stairs.   5. You may feel lightheaded.  IF so, sit for a few minutes before standing.  Have someone help you get up.   6. If you have nausea (feel sick to your stomach): Drink only clear liquids such as apple juice, ginger ale, broth or 7-Up.  Rest may also help.  Be sure to drink enough fluids.  Move to a regular diet as you feel able.  7. You may have a slight fever. Call the doctor if your fever is over 100 F (37.7 C) (taken under the tongue) or lasts longer than 24 hours.  8. You may have a dry mouth, a sore throat, muscle aches or trouble sleeping.  These should go away after 24 hours.  9. Do not make important or legal decisions.   Call your doctor for any of the followin.  Signs of infection (fever, growing tenderness at the surgery site, a large amount of drainage or bleeding, severe pain, foul-smelling drainage, redness, swelling).    2. It has been over 8 to 10 hours since surgery and you are still not able to urinate (pass water).    3.  Headache for over 24 hours.    4.  Numbness, tingling or weakness the day after surgery (if you had spinal anesthesia).  To contact a doctor, call Dr. Jens Wise @ [phone]--Thoracic surgery  or:        [phone] and ask for the resident on call for   Thoracic Surgery (answered 24 hours a day)      Emergency Department:    [facility]: [phone]       (TTY for hearing impaired: [phone])

## 2017-09-15 NOTE — H&P
History and Physical     Minerva Blanco MRN# [id number]   YOB: 1945 Age: 72 year old      Date of Admission:  9/15/2017         History of Present Illness:   Minerva is a 72 year old female with PMH of atrial fibrillation, breast cancer s/p lumpectomy 2007, fibromyalgia, GERD, IBS, meniere's disease, MS and symptomatic hiatal hernia. S/p Toupet fundoplication 1/2016 c/b esophageal perforation with mediastinal phlegmon s/p initial proximal gastrectomy, distal esophagectomy, spit fistula creation, left thoracotomy with mediastinal phlegmon excision on 1/9/2017. Since that time, she has undergone mulptiple procedures for management including esophageal dilations, stent placement/removal, washouts, lysis of adhesions, jejunostomy feeding tube placement, retrosternal gastric pull-through, resection of left half of the manubrium, spit fistula takedown, right tube thoracostomy and pharyngostomy tube placement. She most recently was admitted 5/18 and diagnosed with anastomic leak with mediastinal abscess. S/p mediastinal wound debridement with chest tube placement (5/19) with subsequent pharyngostomy tube 5/31, stent placement 6/4 with exchange 6/27 and multiple washouts (6/2, 6/4, 6/9, 6/14, 6/27). Hospital course complicated by diarrhea (r/t TFs), surgical pain (seen by palliative). She has undergone multiple EGD and stent revisions.    Past Medical History:  Past Medical History:   Diagnosis Date     Arrhythmia     one time event; no longer on meds     Atrial fibrillation (H)      Breast cancer (H) 2007    right side - lumpectomy, chemo, and local radiation     Chronic infection     infection/wound for two years after esoph surgery     Esophageal perforation      Fibromyalgia      GERD (gastroesophageal reflux disease)      Hiatal hernia      History of blood transfusion      IBS (irritable bowel syndrome)      Meniere's disease     deaf left ear     MS (multiple sclerosis) (H)       Multiple sclerosis (H)      Noninfectious ileitis      Other chronic pain        Past Surgical History:  Past Surgical History:   Procedure Laterality Date     APPENDECTOMY       BACK SURGERY  5/1/2015    lumbar fusion     BRONCHOSCOPY FLEXIBLE N/A 4/17/2017    Procedure: BRONCHOSCOPY FLEXIBLE;;  Surgeon: Jens Wise MD;  [facility]     C GASTROSTOMY/JEJUN TUBE      J tube for feedings     CLOSE SPIT FISTULA N/A 4/17/2017    Procedure: CLOSE SPIT FISTULA;;  Surgeon: Jens Wise MD;  [facility]     COMBINED ESOPHAGOSCOPY, GASTROSCOPY, DUODENOSCOPY (EGD), REMOVE ESOPHAGEAL STENT N/A 8/26/2016    Procedure: COMBINED ESOPHAGOSCOPY, GASTROSCOPY, DUODENOSCOPY (EGD), REMOVE ESOPHAGEAL STENT;  Surgeon: Jens Wise MD;  [facility]     COMBINED ESOPHAGOSCOPY, GASTROSCOPY, DUODENOSCOPY (EGD), REPLACE ESOPHAGEAL STENT N/A 8/25/2017    Procedure: COMBINED ESOPHAGOSCOPY, GASTROSCOPY, DUODENOSCOPY (EGD), REPLACE ESOPHAGEAL STENT;;  Surgeon: Jens Wise MD;  [facility]     CREATE SPIT FISTULA N/A 1/9/2017    Procedure: CREATE SPIT FISTULA;  Surgeon: Jens Wise MD;  [facility]     ESOPHAGECTOMY N/A 1/9/2017    Procedure: ESOPHAGECTOMY;  Surgeon: Jens Wise MD;  [facility]     ESOPHAGOSCOPY, GASTROSCOPY, DUODENOSCOPY (EGD), COMBINED N/A 4/18/2016    Procedure: COMBINED ESOPHAGOSCOPY, GASTROSCOPY, DUODENOSCOPY (EGD);  Surgeon: Alexis Barraza MD;  [facility]     ESOPHAGOSCOPY, GASTROSCOPY, DUODENOSCOPY (EGD), COMBINED N/A 4/25/2016    Procedure: COMBINED ESOPHAGOSCOPY, GASTROSCOPY, DUODENOSCOPY (EGD);  Surgeon: Alexis Barraza MD;  [facility]     ESOPHAGOSCOPY, GASTROSCOPY, DUODENOSCOPY (EGD), COMBINED N/A 5/4/2016    Procedure: COMBINED ESOPHAGOSCOPY, GASTROSCOPY, DUODENOSCOPY (EGD);  Surgeon: Alexis Barraza MD;  [facility]     ESOPHAGOSCOPY, GASTROSCOPY,  DUODENOSCOPY (EGD), COMBINED N/A 5/18/2016    Procedure: COMBINED ESOPHAGOSCOPY, GASTROSCOPY, DUODENOSCOPY (EGD);  Surgeon: Alexis Barraza MD;  [facility]     ESOPHAGOSCOPY, GASTROSCOPY, DUODENOSCOPY (EGD), COMBINED N/A 6/22/2016    Procedure: COMBINED ESOPHAGOSCOPY, GASTROSCOPY, DUODENOSCOPY (EGD);  Surgeon: Alexis Barraza MD;  [facility]     ESOPHAGOSCOPY, GASTROSCOPY, DUODENOSCOPY (EGD), COMBINED N/A 7/12/2016    Procedure: COMBINED ESOPHAGOSCOPY, GASTROSCOPY, DUODENOSCOPY (EGD);  Surgeon: Jens Wise MD;  [facility]     ESOPHAGOSCOPY, GASTROSCOPY, DUODENOSCOPY (EGD), COMBINED N/A 4/21/2017    Procedure: COMBINED ESOPHAGOSCOPY, GASTROSCOPY, DUODENOSCOPY (EGD);  Esophagogastroduodenoscopy, Pharyngostomy Tube Placement ;  Surgeon: Jens Wise MD;  [facility]     ESOPHAGOSCOPY, GASTROSCOPY, DUODENOSCOPY (EGD), COMBINED N/A 5/19/2017    Procedure: COMBINED ESOPHAGOSCOPY, GASTROSCOPY, DUODENOSCOPY (EGD);  Upper Endoscopy, Irrigation and Debridement of Chest Wound ;  Surgeon: Nalini Barreto MD;  [facility]     ESOPHAGOSCOPY, GASTROSCOPY, DUODENOSCOPY (EGD), COMBINED N/A 5/23/2017    Procedure: COMBINED ESOPHAGOSCOPY, GASTROSCOPY, DUODENOSCOPY (EGD);;  Surgeon: Nalini Barreto MD;  [facility]     ESOPHAGOSCOPY, GASTROSCOPY, DUODENOSCOPY (EGD), COMBINED N/A 6/27/2017    Procedure: COMBINED ESOPHAGOSCOPY, GASTROSCOPY, DUODENOSCOPY (EGD);  Esophagogastroduodenoscopy With Removal And Replacement Of Esophageal Stent exchange. Exchange of pharyngtomy tube. Chest wound dressing change;  Surgeon: Nalini Barreto MD;  [facility]     ESOPHAGOSCOPY, GASTROSCOPY, DUODENOSCOPY (EGD), COMBINED N/A 8/4/2017    Procedure: COMBINED ESOPHAGOSCOPY, GASTROSCOPY, DUODENOSCOPY (EGD);  Esophagogastroduodenoscopy, Stent Removal and Stent Replacement. Dressing Change ;  Surgeon: Nalini Barreto MD;  [facility]     ESOPHAGOSCOPY, GASTROSCOPY,  DUODENOSCOPY (EGD), COMBINED N/A 8/25/2017    Procedure: COMBINED ESOPHAGOSCOPY, GASTROSCOPY, DUODENOSCOPY (EGD);  Esophagogastroduodenoscopy,  Stent Revision,  Cauterization;  Surgeon: Jens Wise MD;  [facility]     ESOPHAGOSCOPY, GASTROSCOPY, DUODENOSCOPY (EGD), DILATATION, COMBINED N/A 6/29/2016    Procedure: COMBINED ESOPHAGOSCOPY, GASTROSCOPY, DUODENOSCOPY (EGD), DILATATION;  Surgeon: Jens Wise MD;  [facility]     EXPLORE NECK N/A 5/19/2017    Procedure: EXPLORE NECK;;  Surgeon: Nalini Barreto MD;  [facility]     HC UGI ENDOSCOPY W TRANSENDOSCOPIC STENT PLACEMENT N/A 7/12/2016    Procedure: COMBINED ESOPHAGOSCOPY, GASTROSCOPY, DUODENOSCOPY (EGD), PLACE TRANSENDOSCOPIC ESOPHAGEAL STENT;  Surgeon: Jens Wise MD;  [facility]     HC UGI ENDOSCOPY W TRANSENDOSCOPIC STENT PLACEMENT N/A 7/22/2016    Procedure: COMBINED ESOPHAGOSCOPY, GASTROSCOPY, DUODENOSCOPY (EGD), PLACE TRANSENDOSCOPIC ESOPHAGEAL STENT;  Surgeon: Jens Wise MD;  [facility]     INCISION AND DRAINAGE CHEST WASHOUT, COMBINED N/A 5/27/2017    Procedure: COMBINED INCISION AND DRAINAGE CHEST WASHOUT;  Chest washout;  Surgeon: Nalini Barreto MD;  [facility]     INCISION AND DRAINAGE CHEST WASHOUT, COMBINED N/A 5/28/2017    Procedure: COMBINED INCISION AND DRAINAGE CHEST WASHOUT;  Chest washout;  Surgeon: Nalini Barreto MD;  [facility]     INCISION AND DRAINAGE CHEST WASHOUT, COMBINED N/A 6/9/2017    Procedure: COMBINED INCISION AND DRAINAGE CHEST WASHOUT;  Chest washout and dressing change, Esophaogastroduodenoscopy;  Surgeon: Jens Wise MD;  [facility]     INCISION AND DRAINAGE CHEST WASHOUT, COMBINED N/A 7/17/2017    Procedure: COMBINED INCISION AND DRAINAGE CHEST WASHOUT;  Incision and Drainage of right Chest Wound, Esophagogastroduodenoscopy with stent exchange. pharangostomy tube revision;  Surgeon: Jens Wise MD;   [facility]     IRRIGATION AND DEBRIDEMENT CHEST WASHOUT, COMBINED N/A 6/29/2016    Procedure: COMBINED IRRIGATION AND DEBRIDEMENT CHEST WASHOUT;  Surgeon: Jens Wise MD;  [facility]     IRRIGATION AND DEBRIDEMENT CHEST WASHOUT, COMBINED N/A 5/23/2017    Procedure: COMBINED IRRIGATION AND DEBRIDEMENT CHEST WASHOUT;  COMBINED IRRIGATION AND DEBRIDEMENT CHEST WASHOUT,COMBINED ESOPHAGOSCOPY, GASTROSCOPY, DUODENOSCOPY (EGD) ;  Surgeon: Nalini Barreto MD;  [facility]     IRRIGATION AND DEBRIDEMENT CHEST WASHOUT, COMBINED N/A 5/24/2017    Procedure: COMBINED IRRIGATION AND DEBRIDEMENT CHEST WASHOUT;  Chest wound Washout and Dressing Change;  Surgeon: Nalini Barreto MD;  [facility]     IRRIGATION AND DEBRIDEMENT CHEST WASHOUT, COMBINED N/A 5/25/2017    Procedure: COMBINED IRRIGATION AND DEBRIDEMENT CHEST WASHOUT;  Irrigation and Debridement Chest Washout and dressing change;  Surgeon: Nalini Barreto MD;  [facility]     IRRIGATION AND DEBRIDEMENT CHEST WASHOUT, COMBINED N/A 5/26/2017    Procedure: COMBINED IRRIGATION AND DEBRIDEMENT CHEST WASHOUT;  Irrigation and Debridement Chest Washout with  dressing change;  Surgeon: Nalini Barreto MD;  [facility]     IRRIGATION AND DEBRIDEMENT CHEST WASHOUT, COMBINED N/A 5/30/2017    Procedure: COMBINED IRRIGATION AND DEBRIDEMENT CHEST WASHOUT;  Irrigation and Debridement Chest Washout , PICC line dressing change;  Surgeon: Nalini Barreto MD;  [facility]     IRRIGATION AND DEBRIDEMENT CHEST WASHOUT, COMBINED N/A 5/31/2017    Procedure: COMBINED IRRIGATION AND DEBRIDEMENT CHEST WASHOUT;  Irrigation and Debridement Chest Washout ;  Surgeon: Nalini Barreto MD;  [facility]     IRRIGATION AND DEBRIDEMENT CHEST WASHOUT, COMBINED N/A 6/1/2017    Procedure: COMBINED IRRIGATION AND DEBRIDEMENT CHEST WASHOUT;  Chest Wound Irrigation And Debridement with dressing change ;  Surgeon: Nalini Barreto MD;  [facility]     IRRIGATION AND  DEBRIDEMENT CHEST WASHOUT, COMBINED N/A 6/4/2017    Procedure: COMBINED IRRIGATION AND DEBRIDEMENT CHEST WASHOUT;  COMBINED IRRIGATION AND DEBRIDEMENT CHEST WASHOUT, Esophagoscopy, Gastroscopy, Duodenoscopy (EGD) place transendoscopic esophageal stent,   Pharyngostomy and chest wall tube exchange  C-Arm;  Surgeon: Jens Wise MD;  [facility]     IRRIGATION AND DEBRIDEMENT CHEST WASHOUT, COMBINED N/A 6/2/2017    Procedure: COMBINED IRRIGATION AND DEBRIDEMENT CHEST WASHOUT;  Chest Wound Irrigation and Debridement, Dressing Change;  Surgeon: Nalini Barreto MD;  [facility]     LAPAROSCOPIC ASSISTED INSERTION TUBE JEJUNOSTOMY N/A 4/17/2017    Procedure: LAPAROSCOPIC ASSISTED INSERTION TUBE JEJUNOSTOMY;;  Surgeon: Jens Wise MD;  [facility]     LAPAROSCOPY DIAGNOSTIC (GENERAL) N/A 1/9/2017    Procedure: LAPAROSCOPY DIAGNOSTIC (GENERAL);  Surgeon: Jens Wise MD;  [facility]     LAPAROSCOPY DIAGNOSTIC (GENERAL) N/A 4/17/2017    Procedure: LAPAROSCOPY DIAGNOSTIC (GENERAL);  Laparoscopic , Neck Dissection, Spit Fistula Takedown, Laparoscopic Jejunostomy Tube and Pharyngostomy Tube, Gastric Pull up, Upper Endoscopy(EGD)  , Flexible Bronchoscopy ,Sternotomy ;  Surgeon: Jens Wise MD;  [facility]     LUMPECTOMY BREAST Right 2007     NERVE BLOCK PERIPHERAL N/A 8/30/2016    Procedure: NERVE BLOCK PERIPHERAL;  Surgeon: GENERIC ANESTHESIA PROVIDER;  [facility]     NISSEN FUNDOPLICATION  1/2016     PHARYNGOSTOMY N/A 4/18/2016    Procedure: PHARYNGOSTOMY;  Surgeon: Alexis Barraza MD;  [facility]     PHARYNGOSTOMY N/A 4/25/2016    Procedure: PHARYNGOSTOMY;  Surgeon: Alexis Barraza MD;  [facility]     PHARYNGOSTOMY N/A 5/4/2016    Procedure: PHARYNGOSTOMY;  Surgeon: Alexis Barraza MD;  [facility]     PHARYNGOSTOMY N/A 6/22/2016    Procedure: PHARYNGOSTOMY;  Surgeon: Alexis Barraza MD;   "[facility]     PHARYNGOSTOMY N/A 6/29/2016    Procedure: PHARYNGOSTOMY;  Surgeon: Jens Wise MD;  [facility]     PHARYNGOSTOMY N/A 4/21/2017    Procedure: PHARYNGOSTOMY;;  Surgeon: Jens Wise MD;  [facility]     PHARYNGOSTOMY Left 5/31/2017    Procedure: PHARYNGOSTOMY;;  Surgeon: Nalini Barreto MD;  [facility]     PICC INSERTION Left 8/25/2016    5fr DL BioFlo PICC, 42cm (3cm external) in the L medial brachial vein w/ tip in the SVC RA junction.     PICC INSERTION - Rewire Right 05/31/2017    5fr DL BioFlo PICC, 40cm (6cm external) in the R medial brachial vein w/ tip in the SVC RA junction.     THORACOTOMY Right 1998    lung infection - \"hard crust formed on lung\"     THORACOTOMY Left 1/9/2017    Procedure: THORACOTOMY;  Surgeon: Jens Wise MD;  [facility]     WRIST SURGERY         Allergies:     Allergies   Allergen Reactions     Amoxicillin Diarrhea     Ativan [Lorazepam] Other (See Comments)     Hallucinations     Hydromorphone Itching     4/12/17 - patient open to using this as she tolerated Hydromorphone PCA during hospitalization in January 2017.      Morphine Itching       Medications:    No current facility-administered medications on file prior to encounter.   Current Outpatient Prescriptions on File Prior to Encounter:  MELATONIN PO Take 5 mg by mouth At Bedtime   acetaminophen (TYLENOL) 32 mg/mL solution 30.45 mLs (975 mg) by Per Feeding Tube route 3 times daily   diphenoxylate-atropine (LOMOTIL) 2.5-0.025 MG per tablet Take 1 tablet by mouth 4 times daily as needed for diarrhea   pantoprazole (PROTONIX) 40 MG EC tablet Take by mouth 30-60 minutes before a meal.   ferrous sulfate 300 (60 FE) MG/5ML syrup 5 mLs (300 mg) by Per J Tube route daily   fiber modular (NUTRISOURCE FIBER) packet 1 packet by Per Feeding Tube route 3 times daily (with meals)   traZODone (DESYREL) 100 MG tablet 0.5 tablets (50 mg) by Per G Tube route At Bedtime " (Patient taking differently: 100 mg by Per G Tube route At Bedtime )   DiphenhydrAMINE HCl (BENADRYL PO) Take 25 mg by mouth nightly as needed   cholestyramine (QUESTRAN) 4 G Packet Take 1 packet by mouth daily   multivitamins with minerals (CERTAVITE/CEROVITE) LIQD liquid Take 15 mLs by mouth daily   loperamide (IMODIUM) 1 MG/5ML liquid Take 20 mLs (4 mg) by mouth 4 times daily as needed for diarrhea (Patient taking differently: Take 4 mg by mouth 2 times daily )   Lactobacillus Rhamnosus, GG, (CULTURELLE PO) Take 1 tablet by mouth 2 times daily    UNABLE TO FIND 2 nell supplement 4 cans daily per g tube   diphenoxylate-atropine (LOMOTIL) 2.5-0.025 MG/5ML liquid Take 5 mLs by mouth 4 times daily as needed for diarrhea   clotrimazole (LOTRIMIN) 1 % cream Apply topically 2 times daily   chlorhexidine (PERIDEX) 0.12 % solution Swish and spit 15 mLs in mouth 2 times daily   chlorhexidine (HIBICLENS) 4 % liquid Apply topically 2 times daily   [place] DME [place] [facility] [place]-IntermittentSuction Canisters(2)Suction Canister Holders(2)Suction Connect Tube(2)5 in 1 Connector(2)Bacteria Filter(2)[employer]auer Suction(2)Treatment Diagnosis: Esophogeal Perforation, s/p esophagectomy   order for DME Equipment being ordered: Suction PumpSuction Canister(2)Suction Tubing(2)Bacteria Filter(2)[employer]auer(4)Red Rubber Catheter(2)Treatment Diagnosis: Esophogeal Perforation, s/p esophagectomy       Social History:  Social History     Social History     Marital status:      Spouse name: neeru     Number of children: 2     Years of education: N/A     Occupational History     retired       Social History Main Topics     Smoking status: Former Smoker     Packs/day: 1.00     Years: 15.00     Types: Cigarettes     Quit date: 1/1/1998     Smokeless tobacco: Never Used     Alcohol use No     Drug use: No     Sexual activity: Not on file     Other Topics Concern     Not on file     Social History  Narrative    Retired realtor.  Lives with  Richard. 2 children alive and well.       Family History:  Family History   Problem Relation Age of Onset     Alzheimer Disease Mother      CEREBROVASCULAR DISEASE Father      cerebral hemorrhage     Ovarian Cancer Sister      Breast Cancer Daughter        ROS:  The remainder of the complete ROS was negative unless noted in the HPI.    Exam:  /78  Temp 98.2  F (36.8  C) (Oral)  Resp 16  Ht 1.524 m (5')  Wt 42 kg (92 lb 9.5 oz)  SpO2 99%  BMI 18.08 kg/m2  General: Alert, interactive, NAD  Resp: Non-labored breathing on RA  Cardiac: regular rate and rhythm  Abdomen: Soft, nontender, nondistended.  No HSM or masses, no rebound or guarding.    Assessment/ Plan:  72 year old woman s/p esophagectomy now with fistula to skin now undergoing serial cauterization and stent replacements.      Proceed with planned procedure.     Petros Johnson  General Surgery PGY-2  [phone]         STAFF ADDENDUM:  I saw and evaluated Ms. Blanco and agree with the resident s findings and plan of care as documented in the resident s note and edited by me, as applicable.      In summary, Ms. Blanco is here for her scheduled esophageal stent exchange and cauterization of esophago-cutaneous fistula. No contraindication for surgery.  I spent a total of 20 minutes with Ms. Blanco, 15 of which were spent in counseling and coordination of care. The patient had all questions answered and was in agreement with the plan.  Jens Wise MD

## 2017-09-15 NOTE — BRIEF OP NOTE
[facility], [place]    Brief Operative Note    Pre-operative diagnosis: Tracheosophageal Fistula   Post-operative diagnosis Same  Procedure: Procedure(s):  Upper Endoscopy with Stent Exchange, Cauterization of Tracheosophageal Fistula, Cauterization of Chest Wound    - Wound Class: II-Clean Contaminated   - Wound Class: I-Clean     Surgeon: Surgeon(s) and Role:     * Scot Bello MD - Assisting     * Jens Wise MD - Primary       Tegan Smith MD - Resident, Assisting  Anesthesia: General   Estimated blood loss: None  Drains: None  Specimens:   ID Type Source Tests Collected by Time Destination   A : Esophageal Stent Other (specify in comments) Other SURGICAL PATHOLOGY EXAM Jens Wise MD 9/15/2017  7:57 AM      Findings:   Stent removed, fistula visualized and cauterized endoscopically and at skin  Complications: None.  Implants: [id number] esophageal stent    Tegan Smith  General Surgery PGY1  [id number]

## 2017-09-15 NOTE — IP AVS SNAPSHOT
MRN:[id number]                      After Visit Summary   9/15/2017    Minerva Blanco    MRN: [id number]           Thank you!     Thank you for choosing [facility] for your care. Our goal is always to provide you with excellent care. Hearing back from our patients is one way we can continue to improve our services. Please take a few minutes to complete the written survey that you may receive in the mail after you visit with us. Thank you!        Patient Information     Date Of Birth          1945        About your hospital stay     You were admitted on:  September 15, 2017 You last received care in the:  Post Anesthesia Care Unit [facility]    You were discharged on:  September 15, 2017        Reason for your hospital stay       Esophageal stent exchange and fistula cauterization.                  Who to Call     For medical emergencies, please call 911.  For non-urgent questions about your medical care, please call your primary care provider or clinic, [phone]  For questions related to your surgery, please call your surgery clinic        Attending Provider     Provider Specialty    Jens Wise MD Thoracic Diseases       Primary Care Provider Office Phone # Fax #    Andrea Nino [phone]      Follow-up Appointments     Adult [facility] Follow-up and recommended labs and tests       THORACIC SURGERY DISCHARGE INSTRUCTIONS    Continue tube feeds.  Ice chips and popsicles only per mouth.  Continue with routine wound care to chest wound.  Change dressing daily and as needed.      FOLLOW UP with Dr. Wise in clinic next week. Appointment made for 9/20    Continue to follow previous narcotic weaning plan as discussed with Sarina.  May take an additional 5mg Oxy every 4 hours as needed for post operative pain until this prescription runs out.    If your plans upon discharge include prolonged periods of sitting (i.e a lengthy car or plane ride), it is  highly beneficial to get up and walk at least once per hour to help prevent swelling and blood clots.     Call for fever greater than 101.5, chills, increased size of incision, red skin around incision, vision changes, muscle strength changes, sensation changes, shortness of breath, or other concerns.    No driving while taking narcotic pain medication.    Transition to ibuprofen or tylenol/acetaminophen for pain control. Do not take tylenol/acetaminophen and acetaminophen containing narcotic (e.g., percocet or vicodin) at the same time. If you have known ulcer problems, or kidney trouble (elevated creatinine) do not take the ibuprofen.    In emergencies, call [phone]    For other Questions or Concerns;   A.) During weekday working hours (Monday through Friday 8am to 4:30pm)   call [phone] and ask to speak to a clinical nurse specialist.     B.) At nights (after 4:30pm), on weekends, or if urgent call [phone] and   tell the   I would like to page job code [phone], the thoracic surgery   fellow on call, please.         Appointments on [facility] and/or [facility] (with [facility] or [facility] provider or service). Call [phone] if you haven't heard regarding these appointments within 7 days of discharge.                  Further instructions from your care team       [facility]  Same-Day Surgery   Adult Discharge Orders & Instructions     For 24 hours after surgery    1. Get plenty of rest.  A responsible adult must stay with you for at least 24 hours after you leave the hospital.   2. Do not drive or use heavy equipment.  If you have weakness or tingling, don't drive or use heavy equipment until this feeling goes away.  3. Do not drink alcohol.  4. Avoid strenuous or risky activities.  Ask for help when climbing stairs.   5. You may feel lightheaded.  IF so, sit for a few minutes before standing.  Have someone help you get up.   6. If you have nausea (feel sick  to your stomach): Drink only clear liquids such as apple juice, ginger ale, broth or 7-Up.  Rest may also help.  Be sure to drink enough fluids.  Move to a regular diet as you feel able.  7. You may have a slight fever. Call the doctor if your fever is over 100 F (37.7 C) (taken under the tongue) or lasts longer than 24 hours.  8. You may have a dry mouth, a sore throat, muscle aches or trouble sleeping.  These should go away after 24 hours.  9. Do not make important or legal decisions.   Call your doctor for any of the followin.  Signs of infection (fever, growing tenderness at the surgery site, a large amount of drainage or bleeding, severe pain, foul-smelling drainage, redness, swelling).    2. It has been over 8 to 10 hours since surgery and you are still not able to urinate (pass water).    3.  Headache for over 24 hours.    4.  Numbness, tingling or weakness the day after surgery (if you had spinal anesthesia).  To contact a doctor, call Dr. Jens Wise @ [phone]--Thoracic surgery  or:        [phone] and ask for the resident on call for   Thoracic Surgery (answered 24 hours a day)      Emergency Department:    [facility]: [phone]       (TTY for hearing impaired: [phone])              Pending Results     No orders found from 2017 to 2017.            Admission Information     Date & Time Provider Department Dept. Phone    9/15/2017 Jens Wise MD Post Anesthesia Care Unit [facility] [phone]      Your Vitals Were     Blood Pressure Temperature Respirations Height Weight Pulse Oximetry    104/63 97.8  F (36.6  C) (Oral) 20 1.524 m (5') 42 kg (92 lb 9.5 oz) 94%    BMI (Body Mass Index)                   18.08 kg/m2           [facility] Information     [email or web address] gives you secure access to your electronic health record. If you see a primary care provider, you can also send messages to your care team and make appointments. If you have questions, please  call your primary care clinic.  If you do not have a primary care provider, please call [phone] and they will assist you.        Care EveryWhere ID     This is your Care EveryWhere ID. This could be used by other organizations to access your [facility] medical records  [id number]        Equal Access to Services     TOM JORGENSEN : Hadii aad ku hadingezhao Sostanislawali, waaxda luqadaha, qaybta kaalmada gaylebrendasana, florentin masonanyielissa saldivar. So [facility] [place] [phone].    ATENCIÓN: Si habla español, tiene a jackson disposición servicios gratuitos de asistencia lingüística. Sreekanth al [phone].    We comply with applicable federal civil rights laws and [place] laws. We do not discriminate on the basis of race, color, national origin, age, disability sex, sexual orientation or gender identity.               Review of your medicines      START taking        Dose / Directions    ondansetron 4 MG ODT tab   Commonly known as:  ZOFRAN-ODT   Used for:  Esophageal anastomotic leak        Dose:  4 mg   Take 1 tablet (4 mg) by mouth every 6 hours as needed for nausea or vomiting   Quantity:  40 tablet   Refills:  1         CONTINUE these medicines which may have CHANGED, or have new prescriptions. If we are uncertain of the size of tablets/capsules you have at home, strength may be listed as something that might have changed.        Dose / Directions    loperamide 1 MG/5ML liquid   Commonly known as:  IMODIUM   This may have changed:  when to take this   Used for:  Bowel habit changes        Dose:  4 mg   Take 20 mLs (4 mg) by mouth 4 times daily as needed for diarrhea   Quantity:  200 mL   Refills:  0       traZODone 100 MG tablet   Commonly known as:  DESYREL   This may have changed:  how much to take   Used for:  Insomnia, unspecified type        Dose:  50 mg   0.5 tablets (50 mg) by Per G Tube route At Bedtime   Quantity:  30 tablet   Refills:  0         CONTINUE these medicines which have NOT CHANGED        Dose /  Directions    acetaminophen 32 mg/mL solution   Commonly known as:  TYLENOL   Indication:  Pain   Used for:  Esophageal perforation        Dose:  975 mg   30.45 mLs (975 mg) by Per Feeding Tube route 3 times daily   Quantity:  300 mL   Refills:  1       BENADRYL PO        Dose:  25 mg   Take 25 mg by mouth nightly as needed   Refills:  0       chlorhexidine 0.12 % solution   Commonly known as:  PERIDEX   Indication:  Tooth Plaque   Used for:  Esophageal perforation        Dose:  15 mL   Swish and spit 15 mLs in mouth 2 times daily   Refills:  0       chlorhexidine 4 % liquid   Commonly known as:  HIBICLENS   Used for:  History of esophagectomy        Apply topically 2 times daily   Quantity:  200 mL   Refills:  0       cholestyramine 4 G Packet   Commonly known as:  QUESTRAN        Dose:  1 packet   Take 1 packet by mouth daily   Refills:  0       clotrimazole 1 % cream   Commonly known as:  LOTRIMIN   Used for:  Wound abscess, subsequent encounter        Apply topically 2 times daily   Quantity:  12 g   Refills:  1       CULTURELLE PO        Dose:  1 tablet   Take 1 tablet by mouth 2 times daily   Refills:  0       * diphenoxylate-atropine 2.5-0.025 MG/5ML liquid   Commonly known as:  LOMOTIL   Used for:  Bowel habit changes        Dose:  5 mL   Take 5 mLs by mouth 4 times daily as needed for diarrhea   Quantity:  300 mL   Refills:  0       * diphenoxylate-atropine 2.5-0.025 MG per tablet   Commonly known as:  LOMOTIL   Used for:  Esophageal anastomotic leak        Dose:  1 tablet   Take 1 tablet by mouth 4 times daily as needed for diarrhea   Quantity:  40 tablet   Refills:  1       ferrous sulfate 300 (60 FE) MG/5ML syrup   Used for:  Anemia due to other cause        Dose:  300 mg   5 mLs (300 mg) by Per J Tube route daily   Quantity:  150 mL   Refills:  0       fiber modular packet   Used for:  History of esophagectomy        Dose:  1 packet   1 packet by Per Feeding Tube route 3 times daily (with meals)    Quantity:  60 packet   Refills:  0       MELATONIN PO        Dose:  5 mg   Take 5 mg by mouth At Bedtime   Refills:  0       multivitamins with minerals Liqd liquid        Dose:  15 mL   Take 15 mLs by mouth daily   Refills:  0       * order for DME   Used for:  Hx of esophagectomy        Equipment being ordered:  [facility] Suction Machine-Intermittent Suction Canisters(2) Suction Canister Holders(2) Suction Connect Tube(2) 5 in 1 Connector(2) Bacteria Filter(2) Yaunkauer Suction(2)  Treatment Diagnosis: Esophogeal Perforation, s/p esophagectomy   Quantity:  1 Device   Refills:  0       * order for DME   Used for:  Esophageal perforation        Equipment being ordered:  Suction Pump Suction Canister(2) Suction Tubing(2) Bacteria Filter(2) Yaunkauer(4) Red Rubber Catheter(2)  Treatment Diagnosis: Esophogeal Perforation, s/p esophagectomy   Quantity:  1 Device   Refills:  0       * oxyCODONE 10 MG IR tablet   Commonly known as:  ROXICODONE   Used for:  Other chronic pain        Dose:  10 mg   Take 1 tablet (10 mg) by mouth every 6 hours as needed for moderate to severe pain   Quantity:  64 tablet   Refills:  0       * oxyCODONE 5 MG IR tablet   Commonly known as:  ROXICODONE   Used for:  Esophageal anastomotic leak        Dose:  5 mg   Take 1 tablet (5 mg) by mouth every 4 hours as needed for pain or other (Moderate to Severe)   Quantity:  20 tablet   Refills:  0       pantoprazole 40 MG EC tablet   Commonly known as:  PROTONIX   Used for:  Gastroesophageal reflux disease, esophagitis presence not specified        Take by mouth 30-60 minutes before a meal.   Quantity:  30 tablet   Refills:  0       UNABLE TO FIND        2 nell supplement 4 cans daily per g tube   Refills:  0       * Notice:  This list has 6 medication(s) that are the same as other medications prescribed for you. Read the directions carefully, and ask your doctor or other care provider to review them with you.         Where to get your medicines       These medications were sent to [facility] - [place], [place] - [address]  [address], [facility] [address]     Phone:  [phone]     ondansetron 4 MG ODT tab         Some of these will need a paper prescription and others can be bought over the counter. Ask your nurse if you have questions.     Bring a paper prescription for each of these medications     oxyCODONE 5 MG IR tablet                Protect others around you: Learn how to safely use, store and throw away your medicines at www.disposemymeds.org.             Medication List: This is a list of all your medications and when to take them. Check marks below indicate your daily home schedule. Keep this list as a reference.      Medications           Morning Afternoon Evening Bedtime As Needed    acetaminophen 32 mg/mL solution   Commonly known as:  TYLENOL   30.45 mLs (975 mg) by Per Feeding Tube route 3 times daily                                BENADRYL PO   Take 25 mg by mouth nightly as needed                                chlorhexidine 0.12 % solution   Commonly known as:  PERIDEX   Swish and spit 15 mLs in mouth 2 times daily                                chlorhexidine 4 % liquid   Commonly known as:  HIBICLENS   Apply topically 2 times daily                                cholestyramine 4 G Packet   Commonly known as:  QUESTRAN   Take 1 packet by mouth daily                                clotrimazole 1 % cream   Commonly known as:  LOTRIMIN   Apply topically 2 times daily                                CULTURELLE PO   Take 1 tablet by mouth 2 times daily                                * diphenoxylate-atropine 2.5-0.025 MG/5ML liquid   Commonly known as:  LOMOTIL   Take 5 mLs by mouth 4 times daily as needed for diarrhea                                * diphenoxylate-atropine 2.5-0.025 MG per tablet   Commonly known as:  LOMOTIL   Take 1 tablet by mouth 4 times daily as needed for diarrhea                                 ferrous sulfate 300 (60 FE) MG/5ML syrup   5 mLs (300 mg) by Per J Tube route daily                                fiber modular packet   1 packet by Per Feeding Tube route 3 times daily (with meals)                                loperamide 1 MG/5ML liquid   Commonly known as:  IMODIUM   Take 20 mLs (4 mg) by mouth 4 times daily as needed for diarrhea                                MELATONIN PO   Take 5 mg by mouth At Bedtime                                multivitamins with minerals Liqd liquid   Take 15 mLs by mouth daily                                ondansetron 4 MG ODT tab   Commonly known as:  ZOFRAN-ODT   Take 1 tablet (4 mg) by mouth every 6 hours as needed for nausea or vomiting                                * order for DME   Equipment being ordered:  [facility] Suction Machine-Intermittent Suction Canisters(2) Suction Canister Holders(2) Suction Connect Tube(2) 5 in 1 Connector(2) Bacteria Filter(2) Yaunkauer Suction(2)  Treatment Diagnosis: Esophogeal Perforation, s/p esophagectomy                                * order for DME   Equipment being ordered:  Suction Pump Suction Canister(2) Suction Tubing(2) Bacteria Filter(2) [employer]auer(4) Red Rubber Catheter(2)  Treatment Diagnosis: Esophogeal Perforation, s/p esophagectomy                                * oxyCODONE 10 MG IR tablet   Commonly known as:  ROXICODONE   Take 1 tablet (10 mg) by mouth every 6 hours as needed for moderate to severe pain                                * oxyCODONE 5 MG IR tablet   Commonly known as:  ROXICODONE   Take 1 tablet (5 mg) by mouth every 4 hours as needed for pain or other (Moderate to Severe)                                pantoprazole 40 MG EC tablet   Commonly known as:  PROTONIX   Take by mouth 30-60 minutes before a meal.                                traZODone 100 MG tablet   Commonly known as:  DESYREL   0.5 tablets (50 mg) by Per G Tube route At Bedtime                                UNABLE TO FIND   2 nell  supplement 4 cans daily per g tube                                * Notice:  This list has 6 medication(s) that are the same as other medications prescribed for you. Read the directions carefully, and ask your doctor or other care provider to review them with you.

## 2017-09-15 NOTE — ANESTHESIA PREPROCEDURE EVALUATION
Anesthesia Plan      History & Physical Review  History and physical reviewed and following examination; no interval change.    ASA Status:  3 .    NPO Status:  > 8 hours    Plan for General and RSI with Intravenous induction. Maintenance will be Balanced.           Postoperative Care      Consents             Allergies   Allergen Reactions     Amoxicillin Diarrhea     Ativan [Lorazepam] Other (See Comments)     Hallucinations     Hydromorphone Itching     4/12/17 - patient open to using this as she tolerated Hydromorphone PCA during hospitalization in January 2017.      Morphine Itching     Prescription Medications as of 9/15/2017             oxyCODONE (ROXICODONE) 10 MG IR tablet Take 1 tablet (10 mg) by mouth every 6 hours as needed for moderate to severe pain    oxyCODONE (ROXICODONE) 5 MG IR tablet Take 1 tablet (5 mg) by mouth every 4 hours as needed for pain or other (Moderate to Severe)    MELATONIN PO Take 5 mg by mouth At Bedtime    UNABLE TO FIND 2 enll supplement 4 cans daily per g tube    acetaminophen (TYLENOL) 32 mg/mL solution 30.45 mLs (975 mg) by Per Feeding Tube route 3 times daily    diphenoxylate-atropine (LOMOTIL) 2.5-0.025 MG per tablet Take 1 tablet by mouth 4 times daily as needed for diarrhea    pantoprazole (PROTONIX) 40 MG EC tablet Take by mouth 30-60 minutes before a meal.    diphenoxylate-atropine (LOMOTIL) 2.5-0.025 MG/5ML liquid Take 5 mLs by mouth 4 times daily as needed for diarrhea    ferrous sulfate 300 (60 FE) MG/5ML syrup 5 mLs (300 mg) by Per J Tube route daily    clotrimazole (LOTRIMIN) 1 % cream Apply topically 2 times daily    chlorhexidine (PERIDEX) 0.12 % solution Swish and spit 15 mLs in mouth 2 times daily    chlorhexidine (HIBICLENS) 4 % liquid Apply topically 2 times daily    fiber modular (NUTRISOURCE FIBER) packet 1 packet by Per Feeding Tube route 3 times daily (with meals)    traZODone (DESYREL) 100 MG tablet 0.5 tablets (50 mg) by Per G Tube  "route At Bedtime    order for DME Equipment being ordered:   [facility] [place] Suction Machine-Intermittent  Suction Canisters(2)  Suction Canister Holders(2)  Suction Connect Tube(2)  5 in 1 Connector(2)  Bacteria Filter(2)  Yaunkauer Suction(2)    Treatment Diagnosis: Esophogeal Perforation, s/p esophagectomy    order for DME Equipment being ordered:   Suction Pump  Suction Canister(2)  Suction Tubing(2)  Bacteria Filter(2)  Yaunkauer(4)  Red Rubber Catheter(2)    Treatment Diagnosis: Esophogeal Perforation, s/p esophagectomy    DiphenhydrAMINE HCl (BENADRYL PO) Take 25 mg by mouth nightly as needed    cholestyramine (QUESTRAN) 4 G Packet Take 1 packet by mouth daily    multivitamins with minerals (CERTAVITE/CEROVITE) LIQD liquid Take 15 mLs by mouth daily    loperamide (IMODIUM) 1 MG/5ML liquid Take 20 mLs (4 mg) by mouth 4 times daily as needed for diarrhea    Lactobacillus Rhamnosus, GG, (CULTURELLE PO) Take 1 tablet by mouth 2 times daily       Facility Administered Medications as of 9/15/2017             chlorhexidine 4 % solution Apply topically once    Linked Group 1:  \"Or\" Linked Group Details     chlorhexidine 2 % pads Apply topically once    Linked Group 1:  \"Or\" Linked Group Details     antimicrobial soap Apply topically once    Linked Group 1:  \"Or\" Linked Group Details     chlorhexidine 4 % solution Apply topically once    Linked Group 2:  \"Or\" Linked Group Details     chlorhexidine 2 % pads Apply topically once    Linked Group 2:  \"Or\" Linked Group Details     antimicrobial soap Apply topically once    Linked Group 2:  \"Or\" Linked Group Details     chlorhexidine 2 % pads Apply topically once    Linked Group 3:  \"Or\" Linked Group Details     antimicrobial soap Apply topically once    Linked Group 3:  \"Or\" Linked Group Details     PRE OP antibiotics NOT needed for this surgical procedure. 1 each continuous    lactated ringers infusion Inject into the vein continuous prn    midazolam (VERSED) injection Inject into " the vein as needed for anxiety    fentaNYL (PF) (SUBLIMAZE) injection Inject into the vein as needed for moderate to severe pain    lidocaine injection 2% (MDV) Inject into the vein as needed    succinylcholine (ANECTINE) injection Inject into the vein as needed    propofol (DIPRIVAN) injection 10 mg/mL vial Inject into the vein as needed    phenylephrine (OLIVER-SYNEPHRINE) injection 1 mg Inject 1 mg into the vein continuous prn    rocuronium (ZEMURON) injection Inject into the vein as needed    No abx ordered pre-op as needed    sugammadex (BRIDION) injection as needed    ondansetron (ZOFRAN) injection Inject into the vein as needed for nausea or vomiting      No results found for this or any previous visit (from the past 4320 hour(s)).  PAST MEDICAL HISTORY:   Past Medical History:   Diagnosis Date     Arrhythmia     one time event; no longer on meds     Atrial fibrillation (H)      Breast cancer (H) 2007    right side - lumpectomy, chemo, and local radiation     Chronic infection     infection/wound for two years after esoph surgery     Esophageal perforation      Fibromyalgia      GERD (gastroesophageal reflux disease)      Hiatal hernia      History of blood transfusion      IBS (irritable bowel syndrome)      Meniere's disease     deaf left ear     MS (multiple sclerosis) (H)      Multiple sclerosis (H)      Noninfectious ileitis      Other chronic pain        PAST SURGICAL HISTORY:   Past Surgical History:   Procedure Laterality Date     APPENDECTOMY       BACK SURGERY  5/1/2015    lumbar fusion     BRONCHOSCOPY FLEXIBLE N/A 4/17/2017    Procedure: BRONCHOSCOPY FLEXIBLE;;  Surgeon: Jens Wise MD;  [facility]     C GASTROSTOMY/JEJUN TUBE      J tube for feedings     CLOSE SPIT FISTULA N/A 4/17/2017    Procedure: CLOSE SPIT FISTULA;;  Surgeon: Jens Wise MD;  [facility]     COMBINED ESOPHAGOSCOPY, GASTROSCOPY, DUODENOSCOPY (EGD), REMOVE ESOPHAGEAL STENT N/A 8/26/2016     Procedure: COMBINED ESOPHAGOSCOPY, GASTROSCOPY, DUODENOSCOPY (EGD), REMOVE ESOPHAGEAL STENT;  Surgeon: Jens Wise MD;  [facility]     COMBINED ESOPHAGOSCOPY, GASTROSCOPY, DUODENOSCOPY (EGD), REPLACE ESOPHAGEAL STENT N/A 8/25/2017    Procedure: COMBINED ESOPHAGOSCOPY, GASTROSCOPY, DUODENOSCOPY (EGD), REPLACE ESOPHAGEAL STENT;;  Surgeon: Jens Wise MD;  [facility]     CREATE SPIT FISTULA N/A 1/9/2017    Procedure: CREATE SPIT FISTULA;  Surgeon: Jens Wise MD;  [facility]     ESOPHAGECTOMY N/A 1/9/2017    Procedure: ESOPHAGECTOMY;  Surgeon: Jens Wise MD;  [facility]     ESOPHAGOSCOPY, GASTROSCOPY, DUODENOSCOPY (EGD), COMBINED N/A 4/18/2016    Procedure: COMBINED ESOPHAGOSCOPY, GASTROSCOPY, DUODENOSCOPY (EGD);  Surgeon: Alexis Barraza MD;  [facility]     ESOPHAGOSCOPY, GASTROSCOPY, DUODENOSCOPY (EGD), COMBINED N/A 4/25/2016    Procedure: COMBINED ESOPHAGOSCOPY, GASTROSCOPY, DUODENOSCOPY (EGD);  Surgeon: Alexis Barraza MD;  [facility]     ESOPHAGOSCOPY, GASTROSCOPY, DUODENOSCOPY (EGD), COMBINED N/A 5/4/2016    Procedure: COMBINED ESOPHAGOSCOPY, GASTROSCOPY, DUODENOSCOPY (EGD);  Surgeon: Alexis Barraza MD;  [facility]     ESOPHAGOSCOPY, GASTROSCOPY, DUODENOSCOPY (EGD), COMBINED N/A 5/18/2016    Procedure: COMBINED ESOPHAGOSCOPY, GASTROSCOPY, DUODENOSCOPY (EGD);  Surgeon: Alexis Barraza MD;  [facility]     ESOPHAGOSCOPY, GASTROSCOPY, DUODENOSCOPY (EGD), COMBINED N/A 6/22/2016    Procedure: COMBINED ESOPHAGOSCOPY, GASTROSCOPY, DUODENOSCOPY (EGD);  Surgeon: Alexis Barraza MD;  [facility]     ESOPHAGOSCOPY, GASTROSCOPY, DUODENOSCOPY (EGD), COMBINED N/A 7/12/2016    Procedure: COMBINED ESOPHAGOSCOPY, GASTROSCOPY, DUODENOSCOPY (EGD);  Surgeon: Jens Wise MD;  [facility]     ESOPHAGOSCOPY, GASTROSCOPY, DUODENOSCOPY (EGD), COMBINED N/A 4/21/2017     Procedure: COMBINED ESOPHAGOSCOPY, GASTROSCOPY, DUODENOSCOPY (EGD);  Esophagogastroduodenoscopy, Pharyngostomy Tube Placement ;  Surgeon: Jens Wies MD;  [facility]     ESOPHAGOSCOPY, GASTROSCOPY, DUODENOSCOPY (EGD), COMBINED N/A 5/19/2017    Procedure: COMBINED ESOPHAGOSCOPY, GASTROSCOPY, DUODENOSCOPY (EGD);  Upper Endoscopy, Irrigation and Debridement of Chest Wound ;  Surgeon: Nalnii Barreto MD;  [facility]     ESOPHAGOSCOPY, GASTROSCOPY, DUODENOSCOPY (EGD), COMBINED N/A 5/23/2017    Procedure: COMBINED ESOPHAGOSCOPY, GASTROSCOPY, DUODENOSCOPY (EGD);;  Surgeon: Nalini Barreto MD;  [facility]     ESOPHAGOSCOPY, GASTROSCOPY, DUODENOSCOPY (EGD), COMBINED N/A 6/27/2017    Procedure: COMBINED ESOPHAGOSCOPY, GASTROSCOPY, DUODENOSCOPY (EGD);  Esophagogastroduodenoscopy With Removal And Replacement Of Esophageal Stent exchange. Exchange of pharyngtomy tube. Chest wound dressing change;  Surgeon: Nalini Barreto MD;  [facility]     ESOPHAGOSCOPY, GASTROSCOPY, DUODENOSCOPY (EGD), COMBINED N/A 8/4/2017    Procedure: COMBINED ESOPHAGOSCOPY, GASTROSCOPY, DUODENOSCOPY (EGD);  Esophagogastroduodenoscopy, Stent Removal and Stent Replacement. Dressing Change ;  Surgeon: Nalini Barreto MD;  [facility]     ESOPHAGOSCOPY, GASTROSCOPY, DUODENOSCOPY (EGD), COMBINED N/A 8/25/2017    Procedure: COMBINED ESOPHAGOSCOPY, GASTROSCOPY, DUODENOSCOPY (EGD);  Esophagogastroduodenoscopy,  Stent Revision,  Cauterization;  Surgeon: Jens Wise MD;  [facility]     ESOPHAGOSCOPY, GASTROSCOPY, DUODENOSCOPY (EGD), DILATATION, COMBINED N/A 6/29/2016    Procedure: COMBINED ESOPHAGOSCOPY, GASTROSCOPY, DUODENOSCOPY (EGD), DILATATION;  Surgeon: Jens Wise MD;  [facility]     EXPLORE NECK N/A 5/19/2017    Procedure: EXPLORE NECK;;  Surgeon: Nalini Barreto MD;  [facility]      UGI ENDOSCOPY W TRANSENDOSCOPIC STENT PLACEMENT N/A 7/12/2016    Procedure: COMBINED  ESOPHAGOSCOPY, GASTROSCOPY, DUODENOSCOPY (EGD), PLACE TRANSENDOSCOPIC ESOPHAGEAL STENT;  Surgeon: Jens Wise MD;  [facility]     HC UGI ENDOSCOPY W TRANSENDOSCOPIC STENT PLACEMENT N/A 7/22/2016    Procedure: COMBINED ESOPHAGOSCOPY, GASTROSCOPY, DUODENOSCOPY (EGD), PLACE TRANSENDOSCOPIC ESOPHAGEAL STENT;  Surgeon: Jens Wise MD;  [facility]     INCISION AND DRAINAGE CHEST WASHOUT, COMBINED N/A 5/27/2017    Procedure: COMBINED INCISION AND DRAINAGE CHEST WASHOUT;  Chest washout;  Surgeon: Nalini Barreto MD;  [facility]     INCISION AND DRAINAGE CHEST WASHOUT, COMBINED N/A 5/28/2017    Procedure: COMBINED INCISION AND DRAINAGE CHEST WASHOUT;  Chest washout;  Surgeon: Nalini Barreto MD;  [facility]     INCISION AND DRAINAGE CHEST WASHOUT, COMBINED N/A 6/9/2017    Procedure: COMBINED INCISION AND DRAINAGE CHEST WASHOUT;  Chest washout and dressing change, Esophaogastroduodenoscopy;  Surgeon: Jens Wise MD;  [facility]     INCISION AND DRAINAGE CHEST WASHOUT, COMBINED N/A 7/17/2017    Procedure: COMBINED INCISION AND DRAINAGE CHEST WASHOUT;  Incision and Drainage of right Chest Wound, Esophagogastroduodenoscopy with stent exchange. pharangostomy tube revision;  Surgeon: Jens Wise MD;  [facility]     IRRIGATION AND DEBRIDEMENT CHEST WASHOUT, COMBINED N/A 6/29/2016    Procedure: COMBINED IRRIGATION AND DEBRIDEMENT CHEST WASHOUT;  Surgeon: Jens Wise MD;  [facility]     IRRIGATION AND DEBRIDEMENT CHEST WASHOUT, COMBINED N/A 5/23/2017    Procedure: COMBINED IRRIGATION AND DEBRIDEMENT CHEST WASHOUT;  COMBINED IRRIGATION AND DEBRIDEMENT CHEST WASHOUT,COMBINED ESOPHAGOSCOPY, GASTROSCOPY, DUODENOSCOPY (EGD) ;  Surgeon: Nalini Barreto MD;  [facility]     IRRIGATION AND DEBRIDEMENT CHEST WASHOUT, COMBINED N/A 5/24/2017    Procedure: COMBINED IRRIGATION AND DEBRIDEMENT CHEST WASHOUT;  Chest wound Washout and  Dressing Change;  Surgeon: Nalini Barreto MD;  [facility]     IRRIGATION AND DEBRIDEMENT CHEST WASHOUT, COMBINED N/A 5/25/2017    Procedure: COMBINED IRRIGATION AND DEBRIDEMENT CHEST WASHOUT;  Irrigation and Debridement Chest Washout and dressing change;  Surgeon: Nalini Barreto MD;  [facility]     IRRIGATION AND DEBRIDEMENT CHEST WASHOUT, COMBINED N/A 5/26/2017    Procedure: COMBINED IRRIGATION AND DEBRIDEMENT CHEST WASHOUT;  Irrigation and Debridement Chest Washout with  dressing change;  Surgeon: Nalini Barreto MD;  [facility]     IRRIGATION AND DEBRIDEMENT CHEST WASHOUT, COMBINED N/A 5/30/2017    Procedure: COMBINED IRRIGATION AND DEBRIDEMENT CHEST WASHOUT;  Irrigation and Debridement Chest Washout , PICC line dressing change;  Surgeon: Nalini Barreto MD;  [facility]     IRRIGATION AND DEBRIDEMENT CHEST WASHOUT, COMBINED N/A 5/31/2017    Procedure: COMBINED IRRIGATION AND DEBRIDEMENT CHEST WASHOUT;  Irrigation and Debridement Chest Washout ;  Surgeon: Nalini Barreto MD;  [facility]     IRRIGATION AND DEBRIDEMENT CHEST WASHOUT, COMBINED N/A 6/1/2017    Procedure: COMBINED IRRIGATION AND DEBRIDEMENT CHEST WASHOUT;  Chest Wound Irrigation And Debridement with dressing change ;  Surgeon: Nalini Barreto MD;  [facility]     IRRIGATION AND DEBRIDEMENT CHEST WASHOUT, COMBINED N/A 6/4/2017    Procedure: COMBINED IRRIGATION AND DEBRIDEMENT CHEST WASHOUT;  COMBINED IRRIGATION AND DEBRIDEMENT CHEST WASHOUT, Esophagoscopy, Gastroscopy, Duodenoscopy (EGD) place transendoscopic esophageal stent,   Pharyngostomy and chest wall tube exchange  C-Arm;  Surgeon: Jens Wise MD;  [facility]     IRRIGATION AND DEBRIDEMENT CHEST WASHOUT, COMBINED N/A 6/2/2017    Procedure: COMBINED IRRIGATION AND DEBRIDEMENT CHEST WASHOUT;  Chest Wound Irrigation and Debridement, Dressing Change;  Surgeon: Nalini Barreto MD;  [facility]     LAPAROSCOPIC ASSISTED INSERTION TUBE JEJUNOSTOMY  N/A 4/17/2017    Procedure: LAPAROSCOPIC ASSISTED INSERTION TUBE JEJUNOSTOMY;;  Surgeon: Jens Wise MD;  [facility]     LAPAROSCOPY DIAGNOSTIC (GENERAL) N/A 1/9/2017    Procedure: LAPAROSCOPY DIAGNOSTIC (GENERAL);  Surgeon: Jens Wise MD;  [facility]     LAPAROSCOPY DIAGNOSTIC (GENERAL) N/A 4/17/2017    Procedure: LAPAROSCOPY DIAGNOSTIC (GENERAL);  Laparoscopic , Neck Dissection, Spit Fistula Takedown, Laparoscopic Jejunostomy Tube and Pharyngostomy Tube, Gastric Pull up, Upper Endoscopy(EGD)  , Flexible Bronchoscopy ,Sternotomy ;  Surgeon: Jens Wise MD;  [facility]     LUMPECTOMY BREAST Right 2007     NERVE BLOCK PERIPHERAL N/A 8/30/2016    Procedure: NERVE BLOCK PERIPHERAL;  Surgeon: GENERIC ANESTHESIA PROVIDER;  [facility]     NISSEN FUNDOPLICATION  1/2016     PHARYNGOSTOMY N/A 4/18/2016    Procedure: PHARYNGOSTOMY;  Surgeon: Alexis Barraza MD;  [facility]     PHARYNGOSTOMY N/A 4/25/2016    Procedure: PHARYNGOSTOMY;  Surgeon: Alexis Barraza MD;  [facility]     PHARYNGOSTOMY N/A 5/4/2016    Procedure: PHARYNGOSTOMY;  Surgeon: Alexis Barraza MD;  [facility]     PHARYNGOSTOMY N/A 6/22/2016    Procedure: PHARYNGOSTOMY;  Surgeon: Alexis Barraza MD;  [facility]     PHARYNGOSTOMY N/A 6/29/2016    Procedure: PHARYNGOSTOMY;  Surgeon: Jens Wise MD;  [facility]     PHARYNGOSTOMY N/A 4/21/2017    Procedure: PHARYNGOSTOMY;;  Surgeon: Jens Wise MD;  [facility]     PHARYNGOSTOMY Left 5/31/2017    Procedure: PHARYNGOSTOMY;;  Surgeon: Nalini Barreto MD;  [facility]     PICC INSERTION Left 8/25/2016    5fr DL BioFlo PICC, 42cm (3cm external) in the L medial brachial vein w/ tip in the SVC RA junction.     PICC INSERTION - Rewire Right 05/31/2017    5fr DL BioFlo PICC, 40cm (6cm external) in the R medial brachial vein w/ tip in the SVC RA junction.      "THORACOTOMY Right 1998    lung infection - \"hard crust formed on lung\"     THORACOTOMY Left 1/9/2017    Procedure: THORACOTOMY;  Surgeon: Jens Wise MD;  [facility]     WRIST SURGERY         FAMILY HISTORY:   Family History   Problem Relation Age of Onset     Alzheimer Disease Mother      CEREBROVASCULAR DISEASE Father      cerebral hemorrhage     Ovarian Cancer Sister      Breast Cancer Daughter        SOCIAL HISTORY:   Social History   Substance Use Topics     Smoking status: Former Smoker     Packs/day: 1.00     Years: 15.00     Types: Cigarettes     Quit date: 1/1/1998     Smokeless tobacco: Never Used     Alcohol use No     Lab Results   Component Value Date    WBC 5.8 07/07/2017    HGB 10.5 (L) 09/15/2017    HCT 31.1 (L) 07/07/2017     07/07/2017    CHOL 91 01/14/2017    TRIG 51 07/03/2017    HDL 18 (L) 01/14/2017    ALT 26 06/26/2017    AST 18 06/26/2017     07/07/2017    BUN 18 07/07/2017    CO2 29 07/07/2017    INR 0.98 09/15/2017     Prescriptions Prior to Admission   Medication Sig Dispense Refill Last Dose     oxyCODONE (ROXICODONE) 10 MG IR tablet Take 1 tablet (10 mg) by mouth every 6 hours as needed for moderate to severe pain 64 tablet 0 9/15/2017 at AM     MELATONIN PO Take 5 mg by mouth At Bedtime   9/14/2017 at PM     acetaminophen (TYLENOL) 32 mg/mL solution 30.45 mLs (975 mg) by Per Feeding Tube route 3 times daily 300 mL 1 9/14/2017 at PM     diphenoxylate-atropine (LOMOTIL) 2.5-0.025 MG per tablet Take 1 tablet by mouth 4 times daily as needed for diarrhea 40 tablet 1 9/14/2017 at PM     pantoprazole (PROTONIX) 40 MG EC tablet Take by mouth 30-60 minutes before a meal. 30 tablet 0 9/14/2017 at PM     ferrous sulfate 300 (60 FE) MG/5ML syrup 5 mLs (300 mg) by Per J Tube route daily 150 mL 0 9/14/2017 at AM     fiber modular (NUTRISOURCE FIBER) packet 1 packet by Per Feeding Tube route 3 times daily (with meals) 60 packet 0 9/14/2017 at PM     traZODone (DESYREL) " 100 MG tablet 0.5 tablets (50 mg) by Per G Tube route At Bedtime (Patient taking differently: 100 mg by Per G Tube route At Bedtime ) 30 tablet 0 9/14/2017 at PM     DiphenhydrAMINE HCl (BENADRYL PO) Take 25 mg by mouth nightly as needed   9/14/2017 at PM     cholestyramine (QUESTRAN) 4 G Packet Take 1 packet by mouth daily   9/14/2017 at AM     multivitamins with minerals (CERTAVITE/CEROVITE) LIQD liquid Take 15 mLs by mouth daily   9/14/2017 at AM     loperamide (IMODIUM) 1 MG/5ML liquid Take 20 mLs (4 mg) by mouth 4 times daily as needed for diarrhea (Patient taking differently: Take 4 mg by mouth 2 times daily ) 200 mL  9/14/2017 at PM     Lactobacillus Rhamnosus, GG, (CULTURELLE PO) Take 1 tablet by mouth 2 times daily    9/14/2017 at PM     oxyCODONE (ROXICODONE) 5 MG IR tablet Take 1 tablet (5 mg) by mouth every 4 hours as needed for pain or other (Moderate to Severe) 12 tablet 0 Unknown at Unknown time     UNABLE TO FIND 2 nell supplement 4 cans daily per g tube        diphenoxylate-atropine (LOMOTIL) 2.5-0.025 MG/5ML liquid Take 5 mLs by mouth 4 times daily as needed for diarrhea 300 mL 0 Unknown at Unknown time     clotrimazole (LOTRIMIN) 1 % cream Apply topically 2 times daily 12 g 1 More than a month at Unknown time     chlorhexidine (PERIDEX) 0.12 % solution Swish and spit 15 mLs in mouth 2 times daily   More than a month at Unknown time     chlorhexidine (HIBICLENS) 4 % liquid Apply topically 2 times daily 200 mL 0 Unknown at Unknown time     order for DME Equipment being ordered:   [facility] [place] Suction Machine-Intermittent  Suction Canisters(2)  Suction Canister Holders(2)  Suction Connect Tube(2)  5 in 1 Connector(2)  Bacteria Filter(2)  Yaunkauer Suction(2)    Treatment Diagnosis: Esophogeal Perforation, s/p esophagectomy 1 Device 0 Taking     order for DME Equipment being ordered:   Suction Pump  Suction Canister(2)  Suction Tubing(2)  Bacteria Filter(2)  Yaunkauer(4)  Red Rubber Catheter(2)    Treatment  Diagnosis: Esophogeal Perforation, s/p esophagectomy 1 Device 0 Taking                   .

## 2017-09-15 NOTE — IP AVS SNAPSHOT
Post Anesthesia Care Unit [address] [address]    Phone:  [phone]                                       After Visit Summary   9/15/2017    Minerva Blanco    MRN: [id number]           After Visit Summary Signature Page     I have received my discharge instructions, and my questions have been answered. I have discussed any challenges I see with this plan with the nurse or doctor.    ..........................................................................................................................................  Patient/Patient Representative Signature      ..........................................................................................................................................  Patient Representative Print Name and Relationship to Patient    ..................................................               ................................................  Date                                            Time    ..........................................................................................................................................  Reviewed by Signature/Title    ...................................................              ..............................................  Date                                                            Time

## 2017-09-15 NOTE — ANESTHESIA CARE TRANSFER NOTE
Patient: Minerva Blanco    Procedure(s):  Upper Endoscopy with Stent Exchange, Cauterization of Tracheosophageal Fistula, Cauterization of Chest Wound    - Wound Class: II-Clean Contaminated   - Wound Class: I-Clean    Diagnosis: Tracheosophageal Fistula   Diagnosis Additional Information: No value filed.    Anesthesia Type:   No value filed.     Note:  Airway :Face Mask  Patient transferred to:PACU        Vitals: (Last set prior to Anesthesia Care Transfer)    CRNA VITALS  9/15/2017 0809 - 9/15/2017 0846      9/15/2017             Pulse: 99    Ht Rate: 100    SpO2: 100 %    Resp Rate (observed): (!)  1                Electronically Signed By: CLIVE Lewis CRNA  September 15, 2017  8:46 AM

## 2017-09-15 NOTE — ANESTHESIA POSTPROCEDURE EVALUATION
Patient: Minerva Blanco    Procedure(s):  Upper Endoscopy with Stent Exchange, Cauterization of Tracheosophageal Fistula, Cauterization of Chest Wound    - Wound Class: II-Clean Contaminated   - Wound Class: I-Clean    Diagnosis:Tracheosophageal Fistula   Diagnosis Additional Information: No value filed.    Anesthesia Type:  No value filed.    Note:  Anesthesia Post Evaluation    Patient location during evaluation: PACU  Patient participation: Able to fully participate in evaluation  Level of consciousness: awake and alert  Pain management: adequate  Airway patency: patent  Cardiovascular status: acceptable and hemodynamically stable  Respiratory status: acceptable  Hydration status: acceptable  PONV: none     Anesthetic complications: None          Last vitals:  Vitals:    09/15/17 0953 09/15/17 1000 09/15/17 1030   BP: 105/62 112/64 100/63   Pulse: 69     Resp: 16 16 16   Temp: 36.6  C (97.8  F)     SpO2: 92% 93% 95%         Electronically Signed By: Neri Ramirez MD  September 15, 2017  3:17 PM

## 2017-09-16 ASSESSMENT — ENCOUNTER SYMPTOMS
DIFFICULTY URINATING: 0
FEVER: 0
SHORTNESS OF BREATH: 0
EYE REDNESS: 0
NECK STIFFNESS: 0
HEADACHES: 0
COLOR CHANGE: 0
ABDOMINAL PAIN: 0
CONFUSION: 0
ARTHRALGIAS: 0

## 2017-09-17 NOTE — OP NOTE
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      PROCEDURES:   1.  Endoscopy with removal of esophageal stent cauterization of fistulous tract.   2.  Replacement of esophageal stent (19 by 100).   3.  Cauterization of chest wall wound.      SURGEON:  Nirali Lopez MD (present and participated for the entire procedure).      RESIDENT SURGEON:  Tegan Smith MD.        ANESTHESIA:  General.      ESTIMATED BLOOD LOSS:  Zero.      FINDINGS:  There is still a fistulous tract that we cauterized in its entirety both from the endoscopic side as well as from the chest wound side.        DESCRIPTION OF PROCEDURE:  With Favian Santoro in the supine position under general anesthesia, we performed an endoscopy and removed the previous esophageal stent with the rat-tooth forceps without difficulty.  Following this, we identified the tract and then cauterized it extensively endoscopically with the gold probe.  We also cauterized it extensively with the cautery on the skin.  We then placed a wire and under fluoroscopic guidance, replaced the stents, this time a 19 x 100 mm to avoid pressure from the distal end of the stent of the gastric conduit in the same place (the previous stent was 120 mm).      The patient tolerated procedure well.         NIRALI LOPEZ MD             D: 2017 20:41   T: 2017 00:33   MT: TD      Name:     FAVIAN MALONE   MRN:      [id number]        Account:        [id number]   :      1945           Procedure Date: 09/15/2017      Document: [id number]       cc: [facility] Surgery Billing

## 2017-09-18 NOTE — PROGRESS NOTES
THORACIC SURGERY FOLLOW UP VISIT      Dear Dr. Carroll,  I saw Mrs. Minerva Blanco in follow-up today. The clinical summary follows:      PREOP DIAGNOSIS   1.  Chronic esophageal perforation with mediastinal phlegmon.    2.  Status post fundoplication.        PROCEDURE   1/9/2017  1.  Esophagogastroscopy.    2.  Laparoscopic lysis of adhesions.    3.  Laparoscopic proximal gastrectomy and gastrostomy tube placement.    4.  Left thoracotomy with excision of mediastinal phlegmon and distal esophagectomy.    5.  Thoracic duct ligation.    6.  Right tube thoracostomy.    7.  Left esophageal spit fistula.    8.  Bronchoscopy.        6/9/2017  1. Esophagogastroscopy  2. Esophageal stent placement     7/17/2017  1. Esophagogastroduodenoscopy with stent exchange  2. Pharyngostomy tube revision    Multiple EGD, stent revision and cauterization of esophagocutaneous fistula (most recent: 9/15/2017)      PRIOR PROCEDURES  1) Multiple endoscopies and pharyngostomy tube placement x 2 (for drainage of paraesophageal cavity)  2) Esophageal stent placement, attempted placement of biliary stent into the paraesophageal cavity (7/22/2016)  3) Esophageal stent removal, placement of retrograde gastroesophageal tube (10/23/2016)  4) Toupet fundoplication (1/2016)          COMPLICATIONS  Thoracotomy wound infection requiring antibiotics      INTERVAL STUDIES  None      TOB never  ETOH negative      SUBJECTIVE  Mrs. Blanco is doing as usual, no clinical changes.      From a personal perspective, she is here with her  Enrique.          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 Minerva Blanco and Enrique, more than 50% of which were spent in counseling, coordination of care, and face-to-face  time. I reviewed the plan as follows:  1) Weekly cauterization of wound and bi-weekly endoscopic cauterization of fistulous tract     They had all their questions answered and were in agreement with the plan.  I appreciate the opportunity to participate in the care of your patient and will keep you updated.  Sincerely,

## 2017-09-20 ENCOUNTER — OFFICE VISIT (OUTPATIENT)
Dept: SURGERY | Facility: CLINIC | Age: 72
End: 2017-09-20
Attending: THORACIC SURGERY (CARDIOTHORACIC VASCULAR SURGERY)
Payer: MEDICARE

## 2017-09-20 VITALS
BODY MASS INDEX: 18.34 KG/M2 | RESPIRATION RATE: 16 BRPM | OXYGEN SATURATION: 95 % | SYSTOLIC BLOOD PRESSURE: 123 MMHG | HEART RATE: 93 BPM | WEIGHT: 93.9 LBS | DIASTOLIC BLOOD PRESSURE: 74 MMHG | TEMPERATURE: 98.6 F

## 2017-09-20 DIAGNOSIS — K22.89 ESOPHAGEAL FISTULA: Primary | ICD-10-CM

## 2017-09-20 DIAGNOSIS — K22.89: Primary | ICD-10-CM

## 2017-09-20 DIAGNOSIS — K91.89 ESOPHAGEAL ANASTOMOTIC LEAK: ICD-10-CM

## 2017-09-20 LAB — COPATH REPORT: NORMAL

## 2017-09-20 PROCEDURE — 99212 OFFICE O/P EST SF 10 MIN: CPT | Mod: ZF

## 2017-09-20 RX ORDER — OXYCODONE HYDROCHLORIDE 5 MG/1
5 TABLET ORAL EVERY 4 HOURS PRN
Qty: 87 TABLET | Refills: 0 | Status: SHIPPED | OUTPATIENT
Start: 2017-09-20 | End: 2017-10-05

## 2017-09-20 ASSESSMENT — ANXIETY QUESTIONNAIRES
2. NOT BEING ABLE TO STOP OR CONTROL WORRYING: NOT AT ALL
5. BEING SO RESTLESS THAT IT IS HARD TO SIT STILL: NOT AT ALL
1. FEELING NERVOUS, ANXIOUS, OR ON EDGE: NOT AT ALL
7. FEELING AFRAID AS IF SOMETHING AWFUL MIGHT HAPPEN: NOT AT ALL
GAD7 TOTAL SCORE: 0
6. BECOMING EASILY ANNOYED OR IRRITABLE: NOT AT ALL
3. WORRYING TOO MUCH ABOUT DIFFERENT THINGS: NOT AT ALL

## 2017-09-20 ASSESSMENT — PAIN SCALES - GENERAL: PAINLEVEL: MODERATE PAIN (5)

## 2017-09-20 ASSESSMENT — PATIENT HEALTH QUESTIONNAIRE - PHQ9: 5. POOR APPETITE OR OVEREATING: NOT AT ALL

## 2017-09-20 NOTE — LETTER
9/20/2017      RE: Minerva Blanco  [address]   [facility] [address]       THORACIC SURGERY FOLLOW UP VISIT      Dear Dr. Carroll,  I saw Mrs. Minerva Blanco in follow-up today. The clinical summary follows:      PREOP DIAGNOSIS   1.  Chronic esophageal perforation with mediastinal phlegmon.    2.  Status post fundoplication.        PROCEDURE   1/9/2017  1.  Esophagogastroscopy.    2.  Laparoscopic lysis of adhesions.    3.  Laparoscopic proximal gastrectomy and gastrostomy tube placement.    4.  Left thoracotomy with excision of mediastinal phlegmon and distal esophagectomy.    5.  Thoracic duct ligation.    6.  Right tube thoracostomy.    7.  Left esophageal spit fistula.    8.  Bronchoscopy.        6/9/2017  1. Esophagogastroscopy  2. Esophageal stent placement     7/17/2017  1. Esophagogastroduodenoscopy with stent exchange  2. Pharyngostomy tube revision    Multiple EGD, stent revision and cauterization of esophagocutaneous fistula (most recent: 9/15/2017)      PRIOR PROCEDURES  1) Multiple endoscopies and pharyngostomy tube placement x 2 (for drainage of paraesophageal cavity)  2) Esophageal stent placement, attempted placement of biliary stent into the paraesophageal cavity (7/22/2016)  3) Esophageal stent removal, placement of retrograde gastroesophageal tube (10/23/2016)  4) Toupet fundoplication (1/2016)          COMPLICATIONS  Thoracotomy wound infection requiring antibiotics      INTERVAL STUDIES  None      TOB never  ETOH negative      SUBJECTIVE  Mrs. Blanco is doing as usual, no clinical changes.      From a personal perspective, she is here with her  Enrique.          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 Minerva Blanco and  Enrique, more than 50% of which were spent in counseling, coordination of care, and face-to-face time. I reviewed the plan as follows:  1) Weekly cauterization of wound and bi-weekly endoscopic cauterization of fistulous tract     They had all their questions answered and were in agreement with the plan.  I appreciate the opportunity to participate in the care of your patient and will keep you updated.  Sincerely,    Jens Wise MD

## 2017-09-20 NOTE — NURSING NOTE
Oncology Rooming Note    September 20, 2017 8:29 AM   Minerva Blanco is a 72 year old female who presents for:    Chief Complaint   Patient presents with     Oncology Clinic Visit     Patient is here for Esophageal recheck     Initial Vitals: /74 (BP Location: Left arm, Patient Position: Sitting, Cuff Size: Adult Small)  Pulse 93  Temp 98.6  F (37  C) (Oral)  Resp 16  Wt 42.6 kg (93 lb 14.4 oz)  SpO2 95%  BMI 18.34 kg/m2 Estimated body mass index is 18.34 kg/(m^2) as calculated from the following:    Height as of 9/15/17: 1.524 m (5').    Weight as of this encounter: 42.6 kg (93 lb 14.4 oz). Body surface area is 1.34 meters squared.  Moderate Pain (5) Comment: Data Unavailable   No LMP recorded. Patient is postmenopausal.  Allergies reviewed: Yes  Medications reviewed: Yes    Medications: MEDICATION REFILLS NEEDED TODAY. Provider was notified.  Pharmacy name entered into [employer]:    [id number] [place], [place] - [address] [facility] [place], [place] - [address]    Clinical concerns: Patient complained of left and right arm pain 5/10.  Refills for Lomotil 2.5-0.025 MG/5ML liquid and Oxycodone Roxicodone 10 MG.    6 minutes for nursing intake (face to face time)     Roxanne Garcia LPN

## 2017-09-20 NOTE — MR AVS SNAPSHOT
After Visit Summary   9/20/2017    Minerva Blanco    MRN: [id number]           Patient Information     Date Of Birth          1945        Visit Information        Provider Department      9/20/2017 8:30 AM Jens Wise MD [facility]        Today's Diagnoses     Esophagocutaneous fistula    -  1    Esophageal anastomotic leak           Follow-ups after your visit        Who to contact     If you have questions or need follow up information about today's clinic visit or your schedule please contact [facility] directly at [phone].  Normal or non-critical lab and imaging results will be communicated to you by [employer], letter or phone within 4 business days after the clinic has received the results. If you do not hear from us within 7 days, please contact the clinic through [employer] or phone. If you have a critical or abnormal lab result, we will notify you by phone as soon as possible.  Submit refill requests through [employer] or call your pharmacy and they will forward the refill request to us. Please allow 3 business days for your refill to be completed.          Additional Information About Your Visit        MyChart Information     [facility] gives you secure access to your electronic health record. If you see a primary care provider, you can also send messages to your care team and make appointments. If you have questions, please call your primary care clinic.  If you do not have a primary care provider, please call [phone] and they will assist you.        Care EveryWhere ID     This is your Care EveryWhere ID. This could be used by other organizations to access your [facility] medical records  [id number]        Your Vitals Were     Pulse Temperature Respirations Pulse Oximetry BMI (Body Mass Index)       93 98.6  F (37  C) (Oral) 16 95% 18.34 kg/m2        Blood Pressure from Last 3 Encounters:   09/15/17 100/63   09/20/17 123/74    09/06/17 139/59    Weight from Last 3 Encounters:   09/15/17 42 kg (92 lb 9.5 oz)   09/20/17 42.6 kg (93 lb 14.4 oz)   09/05/17 39.9 kg (88 lb)              Today, you had the following     No orders found for display         Today's Medication Changes      Notice     This visit is during an admission. Changes to the med list made in this visit will be reflected in the After Visit Summary of the admission.             Primary Care Provider Office Phone # Fax #    Andrea iNno [phone]       [place] [address]        Equal Access to Services     [facility] Center[facility] : Hadii davion Meng, isis romero, katharina juarezmasana anderson, florentin amaya . So [facility] [phone].    ATENCIÓN: Si habla español, tiene a jackson disposición servicios gratuitos de asistencia lingüística. Llame al [phone].    We comply with applicable federal civil rights laws and [place] laws. We do not discriminate on the basis of race, color, national origin, age, disability sex, sexual orientation or gender identity.            Thank you!     Thank you for choosing [facility]  for your care. Our goal is always to provide you with excellent care. Hearing back from our patients is one way we can continue to improve our services. Please take a few minutes to complete the written survey that you may receive in the mail after your visit with us. Thank you!             Your Updated Medication List - Protect others around you: Learn how to safely use, store and throw away your medicines at www.disposemymeds.org.      Notice     This visit is during an admission. Changes to the med list made in this visit will be reflected in the After Visit Summary of the admission.

## 2017-09-21 DIAGNOSIS — K91.89 ESOPHAGEAL ANASTOMOTIC LEAK: ICD-10-CM

## 2017-09-21 RX ORDER — DIPHENOXYLATE HCL/ATROPINE 2.5-.025MG
1 TABLET ORAL 4 TIMES DAILY PRN
Qty: 40 TABLET | Refills: 1 | Status: SHIPPED | OUTPATIENT
Start: 2017-09-21 | End: 2017-10-26

## 2017-09-21 ASSESSMENT — ANXIETY QUESTIONNAIRES: GAD7 TOTAL SCORE: 0

## 2017-09-22 ENCOUNTER — TELEPHONE (OUTPATIENT)
Dept: ONCOLOGY | Facility: CLINIC | Age: 72
End: 2017-09-22

## 2017-09-22 NOTE — TELEPHONE ENCOUNTER
Prior Authorization Approval    Authorization Effective Date: 6/21/2017  Authorization Expiration Date: 9/19/2018  Medication: Generic Lomotil - Approval  Approved Dose/Quantity: 2.5-0.25 40/10  Reference #:     Insurance Company: [employer] [place] - Phone [phone] Fax [phone]  Expected CoPay:       CoPay Card Available:      Foundation Assistance Needed:    Which Pharmacy is filling the prescription (Not needed for infusion/clinic administered):    Pharmacy Notified: Yes  Patient Notified: Yes      Lizzy Rubio  [facility]  Oncology Pharmacy Liaison   [email or web address]  [phone] (phone  [phone] (fax

## 2017-09-26 NOTE — PROGRESS NOTES
This is a recent snapshot of the patient's [facility] medical record.  For current drug dose and complete information and questions, call [phone] or In Basket pool, [facility]v home infusion ([id number])  CSN Number:  [id number]

## 2017-10-02 ENCOUNTER — ANESTHESIA (OUTPATIENT)
Dept: SURGERY | Facility: CLINIC | Age: 72
End: 2017-10-02
Payer: MEDICARE

## 2017-10-02 ENCOUNTER — HOSPITAL ENCOUNTER (OUTPATIENT)
Facility: CLINIC | Age: 72
Discharge: HOME | End: 2017-10-02
Attending: STUDENT IN AN ORGANIZED HEALTH CARE EDUCATION/TRAINING PROGRAM | Admitting: STUDENT IN AN ORGANIZED HEALTH CARE EDUCATION/TRAINING PROGRAM
Payer: MEDICARE

## 2017-10-02 ENCOUNTER — APPOINTMENT (OUTPATIENT)
Dept: GENERAL RADIOLOGY | Facility: CLINIC | Age: 72
End: 2017-10-02
Attending: STUDENT IN AN ORGANIZED HEALTH CARE EDUCATION/TRAINING PROGRAM
Payer: MEDICARE

## 2017-10-02 ENCOUNTER — ANESTHESIA EVENT (OUTPATIENT)
Dept: SURGERY | Facility: CLINIC | Age: 72
End: 2017-10-02
Payer: MEDICARE

## 2017-10-02 ENCOUNTER — SURGERY (OUTPATIENT)
Age: 72
End: 2017-10-02

## 2017-10-02 VITALS
WEIGHT: 90.83 LBS | RESPIRATION RATE: 16 BRPM | HEIGHT: 60 IN | BODY MASS INDEX: 17.83 KG/M2 | TEMPERATURE: 98.6 F | OXYGEN SATURATION: 100 % | DIASTOLIC BLOOD PRESSURE: 58 MMHG | SYSTOLIC BLOOD PRESSURE: 123 MMHG

## 2017-10-02 DIAGNOSIS — K22.3 ESOPHAGEAL PERFORATION: Primary | ICD-10-CM

## 2017-10-02 LAB — GLUCOSE BLDC GLUCOMTR-MCNC: 79 MG/DL (ref 70–99)

## 2017-10-02 PROCEDURE — 71000012 ZZH RECOVERY PHASE 1 LEVEL 1 FIRST HR: Performed by: STUDENT IN AN ORGANIZED HEALTH CARE EDUCATION/TRAINING PROGRAM

## 2017-10-02 PROCEDURE — 25000128 H RX IP 250 OP 636: Performed by: NURSE ANESTHETIST, CERTIFIED REGISTERED

## 2017-10-02 PROCEDURE — 36000053 ZZH SURGERY LEVEL 2 EA 15 ADDTL MIN - UMMC: Performed by: STUDENT IN AN ORGANIZED HEALTH CARE EDUCATION/TRAINING PROGRAM

## 2017-10-02 PROCEDURE — 40000170 ZZH STATISTIC PRE-PROCEDURE ASSESSMENT II: Performed by: STUDENT IN AN ORGANIZED HEALTH CARE EDUCATION/TRAINING PROGRAM

## 2017-10-02 PROCEDURE — 25000128 H RX IP 250 OP 636: Performed by: STUDENT IN AN ORGANIZED HEALTH CARE EDUCATION/TRAINING PROGRAM

## 2017-10-02 PROCEDURE — 25000566 ZZH SEVOFLURANE, EA 15 MIN: Performed by: STUDENT IN AN ORGANIZED HEALTH CARE EDUCATION/TRAINING PROGRAM

## 2017-10-02 PROCEDURE — 88300 SURGICAL PATH GROSS: CPT | Performed by: STUDENT IN AN ORGANIZED HEALTH CARE EDUCATION/TRAINING PROGRAM

## 2017-10-02 PROCEDURE — 25000125 ZZHC RX 250: Performed by: NURSE ANESTHETIST, CERTIFIED REGISTERED

## 2017-10-02 PROCEDURE — 27210794 ZZH OR GENERAL SUPPLY STERILE: Performed by: STUDENT IN AN ORGANIZED HEALTH CARE EDUCATION/TRAINING PROGRAM

## 2017-10-02 PROCEDURE — 40000277 XR SURGERY CARM FLUORO LESS THAN 5 MIN W STILLS: Mod: TC

## 2017-10-02 PROCEDURE — 25000128 H RX IP 250 OP 636: Performed by: ANESTHESIOLOGY

## 2017-10-02 PROCEDURE — 82962 GLUCOSE BLOOD TEST: CPT

## 2017-10-02 PROCEDURE — 36000055 ZZH SURGERY LEVEL 2 W FLUORO 1ST 30 MIN - UMMC: Performed by: STUDENT IN AN ORGANIZED HEALTH CARE EDUCATION/TRAINING PROGRAM

## 2017-10-02 PROCEDURE — 71000027 ZZH RECOVERY PHASE 2 EACH 15 MINS: Performed by: STUDENT IN AN ORGANIZED HEALTH CARE EDUCATION/TRAINING PROGRAM

## 2017-10-02 PROCEDURE — C1769 GUIDE WIRE: HCPCS | Performed by: STUDENT IN AN ORGANIZED HEALTH CARE EDUCATION/TRAINING PROGRAM

## 2017-10-02 PROCEDURE — 37000008 ZZH ANESTHESIA TECHNICAL FEE, 1ST 30 MIN: Performed by: STUDENT IN AN ORGANIZED HEALTH CARE EDUCATION/TRAINING PROGRAM

## 2017-10-02 PROCEDURE — C1874 STENT, COATED/COV W/DEL SYS: HCPCS | Performed by: STUDENT IN AN ORGANIZED HEALTH CARE EDUCATION/TRAINING PROGRAM

## 2017-10-02 PROCEDURE — 37000009 ZZH ANESTHESIA TECHNICAL FEE, EACH ADDTL 15 MIN: Performed by: STUDENT IN AN ORGANIZED HEALTH CARE EDUCATION/TRAINING PROGRAM

## 2017-10-02 DEVICE — STENT ESOPHAGEAL ENDOMAXX 19X120MM MAXX-1912
Type: IMPLANTABLE DEVICE | Site: ESOPHAGUS | Status: NON-FUNCTIONAL
Removed: 2017-10-20

## 2017-10-02 RX ORDER — ONDANSETRON 2 MG/ML
INJECTION INTRAMUSCULAR; INTRAVENOUS PRN
Status: DISCONTINUED | OUTPATIENT
Start: 2017-10-02 | End: 2017-10-02

## 2017-10-02 RX ORDER — PROPOFOL 10 MG/ML
INJECTION, EMULSION INTRAVENOUS PRN
Status: DISCONTINUED | OUTPATIENT
Start: 2017-10-02 | End: 2017-10-02

## 2017-10-02 RX ORDER — FENTANYL CITRATE 50 UG/ML
25-50 INJECTION, SOLUTION INTRAMUSCULAR; INTRAVENOUS
Status: DISCONTINUED | OUTPATIENT
Start: 2017-10-02 | End: 2017-10-02 | Stop reason: HOSPADM

## 2017-10-02 RX ORDER — IOPAMIDOL 755 MG/ML
INJECTION, SOLUTION INTRAVASCULAR PRN
Status: DISCONTINUED | OUTPATIENT
Start: 2017-10-02 | End: 2017-10-02 | Stop reason: HOSPADM

## 2017-10-02 RX ORDER — ONDANSETRON 2 MG/ML
4 INJECTION INTRAMUSCULAR; INTRAVENOUS EVERY 30 MIN PRN
Status: DISCONTINUED | OUTPATIENT
Start: 2017-10-02 | End: 2017-10-02 | Stop reason: HOSPADM

## 2017-10-02 RX ORDER — LIDOCAINE 40 MG/G
CREAM TOPICAL
Status: DISCONTINUED | OUTPATIENT
Start: 2017-10-02 | End: 2017-10-02 | Stop reason: HOSPADM

## 2017-10-02 RX ORDER — OXYCODONE HCL 5 MG/5 ML
5 SOLUTION, ORAL ORAL EVERY 4 HOURS PRN
Qty: 75 ML | Refills: 0 | Status: ON HOLD | OUTPATIENT
Start: 2017-10-02 | End: 2017-10-20

## 2017-10-02 RX ORDER — ACETAMINOPHEN 325 MG/1
650 TABLET ORAL
Status: DISCONTINUED | OUTPATIENT
Start: 2017-10-02 | End: 2017-10-02 | Stop reason: HOSPADM

## 2017-10-02 RX ORDER — ALBUTEROL SULFATE 0.83 MG/ML
2.5 SOLUTION RESPIRATORY (INHALATION) EVERY 4 HOURS PRN
Status: DISCONTINUED | OUTPATIENT
Start: 2017-10-02 | End: 2017-10-02 | Stop reason: HOSPADM

## 2017-10-02 RX ORDER — LIDOCAINE HYDROCHLORIDE 20 MG/ML
INJECTION, SOLUTION INFILTRATION; PERINEURAL PRN
Status: DISCONTINUED | OUTPATIENT
Start: 2017-10-02 | End: 2017-10-02

## 2017-10-02 RX ORDER — ONDANSETRON 4 MG/1
4 TABLET, ORALLY DISINTEGRATING ORAL EVERY 30 MIN PRN
Status: DISCONTINUED | OUTPATIENT
Start: 2017-10-02 | End: 2017-10-02 | Stop reason: HOSPADM

## 2017-10-02 RX ORDER — HYDROMORPHONE HYDROCHLORIDE 1 MG/ML
.3-.5 INJECTION, SOLUTION INTRAMUSCULAR; INTRAVENOUS; SUBCUTANEOUS EVERY 5 MIN PRN
Status: DISCONTINUED | OUTPATIENT
Start: 2017-10-02 | End: 2017-10-02 | Stop reason: HOSPADM

## 2017-10-02 RX ORDER — METOPROLOL TARTRATE 1 MG/ML
1-2 INJECTION, SOLUTION INTRAVENOUS EVERY 5 MIN PRN
Status: DISCONTINUED | OUTPATIENT
Start: 2017-10-02 | End: 2017-10-02 | Stop reason: HOSPADM

## 2017-10-02 RX ORDER — SODIUM CHLORIDE, SODIUM LACTATE, POTASSIUM CHLORIDE, CALCIUM CHLORIDE 600; 310; 30; 20 MG/100ML; MG/100ML; MG/100ML; MG/100ML
INJECTION, SOLUTION INTRAVENOUS CONTINUOUS PRN
Status: DISCONTINUED | OUTPATIENT
Start: 2017-10-02 | End: 2017-10-02

## 2017-10-02 RX ORDER — HYDROMORPHONE HYDROCHLORIDE 1 MG/ML
.3-.5 INJECTION, SOLUTION INTRAMUSCULAR; INTRAVENOUS; SUBCUTANEOUS EVERY 10 MIN PRN
Status: DISCONTINUED | OUTPATIENT
Start: 2017-10-02 | End: 2017-10-02 | Stop reason: HOSPADM

## 2017-10-02 RX ORDER — SODIUM CHLORIDE, SODIUM LACTATE, POTASSIUM CHLORIDE, CALCIUM CHLORIDE 600; 310; 30; 20 MG/100ML; MG/100ML; MG/100ML; MG/100ML
INJECTION, SOLUTION INTRAVENOUS CONTINUOUS
Status: DISCONTINUED | OUTPATIENT
Start: 2017-10-02 | End: 2017-10-02 | Stop reason: HOSPADM

## 2017-10-02 RX ORDER — FENTANYL CITRATE 50 UG/ML
INJECTION, SOLUTION INTRAMUSCULAR; INTRAVENOUS PRN
Status: DISCONTINUED | OUTPATIENT
Start: 2017-10-02 | End: 2017-10-02

## 2017-10-02 RX ORDER — HYDRALAZINE HYDROCHLORIDE 20 MG/ML
2.5-5 INJECTION INTRAMUSCULAR; INTRAVENOUS EVERY 10 MIN PRN
Status: DISCONTINUED | OUTPATIENT
Start: 2017-10-02 | End: 2017-10-02 | Stop reason: HOSPADM

## 2017-10-02 RX ORDER — NALOXONE HYDROCHLORIDE 0.4 MG/ML
.1-.4 INJECTION, SOLUTION INTRAMUSCULAR; INTRAVENOUS; SUBCUTANEOUS
Status: DISCONTINUED | OUTPATIENT
Start: 2017-10-02 | End: 2017-10-02 | Stop reason: HOSPADM

## 2017-10-02 RX ORDER — LABETALOL HYDROCHLORIDE 5 MG/ML
10 INJECTION, SOLUTION INTRAVENOUS
Status: DISCONTINUED | OUTPATIENT
Start: 2017-10-02 | End: 2017-10-02 | Stop reason: HOSPADM

## 2017-10-02 RX ADMIN — PROPOFOL 20 MG: 10 INJECTION, EMULSION INTRAVENOUS at 09:41

## 2017-10-02 RX ADMIN — HYDROMORPHONE HYDROCHLORIDE 0.3 MG: 1 INJECTION, SOLUTION INTRAMUSCULAR; INTRAVENOUS; SUBCUTANEOUS at 11:20

## 2017-10-02 RX ADMIN — FENTANYL CITRATE 50 MCG: 50 INJECTION, SOLUTION INTRAMUSCULAR; INTRAVENOUS at 09:17

## 2017-10-02 RX ADMIN — FENTANYL CITRATE 25 MCG: 50 INJECTION, SOLUTION INTRAMUSCULAR; INTRAVENOUS at 10:04

## 2017-10-02 RX ADMIN — Medication 80 MG: at 09:18

## 2017-10-02 RX ADMIN — FENTANYL CITRATE 25 MCG: 50 INJECTION, SOLUTION INTRAMUSCULAR; INTRAVENOUS at 10:53

## 2017-10-02 RX ADMIN — FENTANYL CITRATE 50 MCG: 50 INJECTION, SOLUTION INTRAMUSCULAR; INTRAVENOUS at 11:01

## 2017-10-02 RX ADMIN — HYDROMORPHONE HYDROCHLORIDE 0.3 MG: 1 INJECTION, SOLUTION INTRAMUSCULAR; INTRAVENOUS; SUBCUTANEOUS at 11:10

## 2017-10-02 RX ADMIN — FENTANYL CITRATE 50 MCG: 50 INJECTION, SOLUTION INTRAMUSCULAR; INTRAVENOUS at 09:41

## 2017-10-02 RX ADMIN — FENTANYL CITRATE 25 MCG: 50 INJECTION, SOLUTION INTRAMUSCULAR; INTRAVENOUS at 10:47

## 2017-10-02 RX ADMIN — PROPOFOL 90 MG: 10 INJECTION, EMULSION INTRAVENOUS at 09:18

## 2017-10-02 RX ADMIN — FENTANYL CITRATE 25 MCG: 50 INJECTION, SOLUTION INTRAMUSCULAR; INTRAVENOUS at 10:11

## 2017-10-02 RX ADMIN — SODIUM CHLORIDE, POTASSIUM CHLORIDE, SODIUM LACTATE AND CALCIUM CHLORIDE: 600; 310; 30; 20 INJECTION, SOLUTION INTRAVENOUS at 09:13

## 2017-10-02 RX ADMIN — LIDOCAINE HYDROCHLORIDE 80 MG: 20 INJECTION, SOLUTION INFILTRATION; PERINEURAL at 09:18

## 2017-10-02 RX ADMIN — ONDANSETRON 4 MG: 2 INJECTION INTRAMUSCULAR; INTRAVENOUS at 10:13

## 2017-10-02 RX ADMIN — FENTANYL CITRATE 50 MCG: 50 INJECTION, SOLUTION INTRAMUSCULAR; INTRAVENOUS at 09:18

## 2017-10-02 RX ADMIN — IOPAMIDOL 10 ML: 755 INJECTION, SOLUTION INTRAVENOUS at 10:31

## 2017-10-02 RX ADMIN — MIDAZOLAM HYDROCHLORIDE 2 MG: 1 INJECTION, SOLUTION INTRAMUSCULAR; INTRAVENOUS at 09:09

## 2017-10-02 ASSESSMENT — ENCOUNTER SYMPTOMS: DYSRHYTHMIAS: 1

## 2017-10-02 ASSESSMENT — LIFESTYLE VARIABLES: TOBACCO_USE: 1

## 2017-10-02 NOTE — DISCHARGE INSTRUCTIONS
[facility]  Same-Day Surgery   Adult Discharge Orders & Instructions     For 24 hours after surgery    1. Get plenty of rest.  A responsible adult must stay with you for at least 24 hours after you leave the hospital.   2. Do not drive or use heavy equipment.  If you have weakness or tingling, don't drive or use heavy equipment until this feeling goes away.  3. Do not drink alcohol.  4. Avoid strenuous or risky activities.  Ask for help when climbing stairs.   5. You may feel lightheaded.  IF so, sit for a few minutes before standing.  Have someone help you get up.   6. If you have nausea (feel sick to your stomach): Drink only clear liquids such as apple juice, ginger ale, broth or 7-Up.  Rest may also help.  Be sure to drink enough fluids.  Move to a regular diet as you feel able.  7. You may have a slight fever. Call the doctor if your fever is over 100 F (37.7 C) (taken under the tongue) or lasts longer than 24 hours.  8. You may have a dry mouth, a sore throat, muscle aches or trouble sleeping.  These should go away after 24 hours.  9. Do not make important or legal decisions.   Call your doctor for any of the followin.  Signs of infection (fever, growing tenderness at the surgery site, a large amount of drainage or bleeding, severe pain, foul-smelling drainage, redness, swelling).    2. It has been over 8 to 10 hours since surgery and you are still not able to urinate (pass water).    3.  Headache for over 24 hours.    To contact a doctor, call Dr. Nalini Barreto @ [phone] (clinic) or [phone] (office) or:        [phone] and ask for the resident on call for thoracic surgery (answered 24 hours a day)      Emergency Department:    [facility]: [phone]       (TTY for hearing impaired: [phone])      Take it easy when you get home.  Remember, same day surgery DOES NOT MEAN SAME DAY RECOVERY!  Healing is a gradual process.  You will need some  time to recover - you may be more tired than you realize at first.  Rest and relax for at least the first 24 hours at home.  You'll feel better and heal faster if you take good care of yourself.

## 2017-10-02 NOTE — PROGRESS NOTES
Pt concerned about going home without and pain medication. Writer paged Dr. Neri Johnson. Will wait for reply.

## 2017-10-02 NOTE — ANESTHESIA CARE TRANSFER NOTE
Patient: Minerva Blanco    Procedure(s):  Esophagogastroduodenostomy, Replacement of Esophageal Stent, Cauterization of Tracheosophageal Fistula anc chest wall,   C-arm - Wound Class: II-Clean Contaminated    Diagnosis: Esophageal Fistula   Diagnosis Additional Information: No value filed.    Anesthesia Type:   General, RSI     Note:  Airway :Nasal Cannula  Patient transferred to:PACU  Comments: Pt to recovery room.  Spontaneous respirations.   Report given to RN.        Vitals: (Last set prior to Anesthesia Care Transfer)    CRNA VITALS  10/2/2017 1000 - 10/2/2017 1035      10/2/2017             NIBP: 142/75    Pulse: 79    SpO2: 100 %    EKG: Sinus rhythm                Electronically Signed By: CLIVE Gillis CRNA  October 2, 2017  10:35 AM

## 2017-10-02 NOTE — BRIEF OP NOTE
[facility], [place]    Brief Operative Note    Pre-operative diagnosis: Esophageal Fistula   Post-operative diagnosis * No post-op diagnosis entered *  Procedure: Procedure(s):  Upper Endoscopy with Stent Exchange, Cauterization of Tracheosophageal Fistula   C-arm - Wound Class: II-Clean Contaminated  Surgeon: Surgeon(s) and Role:     * Nalini Barreto MD - Primary     * Neri Johnson MD - Resident - Assisting  Anesthesia: General   Estimated blood loss: Minimal  Drains: None  Specimens:   ID Type Source Tests Collected by Time Destination   A : esophageal stent Other (specify in comments) Other SURGICAL PATHOLOGY EXAM Nalini Barreto MD 10/2/2017  9:44 AM      Findings:   Bubbling from skin wound before removal of stent. [id number] stent removed.  Fistula identified and cauterized.  [id number] stent placed.  Skin wound cauterized. .  Complications: None.  Implants: None.    Petros Johnson  General Surgery PGY-3  Pager [phone]

## 2017-10-02 NOTE — IP AVS SNAPSHOT
Post Anesthesia Care Unit [address] [address]    Phone:  [phone]                                       After Visit Summary   10/2/2017    Minerva Blanco    MRN: [id number]           After Visit Summary Signature Page     I have received my discharge instructions, and my questions have been answered. I have discussed any challenges I see with this plan with the nurse or doctor.    ..........................................................................................................................................  Patient/Patient Representative Signature      ..........................................................................................................................................  Patient Representative Print Name and Relationship to Patient    ..................................................               ................................................  Date                                            Time    ..........................................................................................................................................  Reviewed by Signature/Title    ...................................................              ..............................................  Date                                                            Time

## 2017-10-02 NOTE — OP NOTE
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       PROCEDURES:   1.  Endoscopy with removal of esophageal stent and cauterization of fistulous tract.   2.  Replacement of esophageal stent (19 by 120).   3.  Cauterization of chest wall wound.       SURGEON:  Nalini Barreto MD      RESIDENT SURGEON:  Neri Johnson MD.         ANESTHESIA:  General.       ESTIMATED BLOOD LOSS:  Zero.       FINDINGS:  There is still a fistulous tract that we cauterized in its entirety both from the endoscopic side as well as from the chest wound side.         DESCRIPTION OF PROCEDURE:  With Minerva Santoro in the supine position under general anesthesia, we performed an endoscopy and removed the previous esophageal stent with the rat-tooth forceps without difficulty.  Following this, we identified the tract and then cauterized it extensively endoscopically with the gold probe.  We also cauterized it extensively with the cautery on the skin.  We then placed a wire and under fluoroscopic guidance, replaced the stents. We used a 19 x 120mm stent to avoid pressure from the distal end of the stent of the gastric conduit in the same place and also to ensure adequate coverage of the fistula.       The patient tolerated procedure well.

## 2017-10-02 NOTE — ANESTHESIA PREPROCEDURE EVALUATION
Anesthesia Evaluation     . Pt has had prior anesthetic. Type: General    No history of anesthetic complications          ROS/MED HX    ENT/Pulmonary:     (+)tobacco use, Past use , . .    Neurologic:     (+)Multiple Sclerosis     Cardiovascular:     (+) ----. : . . . :. dysrhythmias a-fib, . Previous cardiac testing Echodate:1/9/17results:Technically difficult study.  Global and regional left ventricular function is hyperdynamic with an LVEF  >70%.  The right ventricular function is hyperdynamic.  Mild pulmonary hypertension is present.  The inferior vena cava is normal. The estimated mean right atrial pressure is  <3 mmHg.  No pericardial effusion is present.  Previous study not available for comparison.date: results: date: results: date: results:          METS/Exercise Tolerance:  >4 METS   Hematologic:     (+) Anemia, -      Musculoskeletal:  - neg musculoskeletal ROS       GI/Hepatic: Comment: Hx of esophagectomy, esophageal perforation with fistula, IBS    (+) GERD hiatal hernia,       Renal/Genitourinary:  - ROS Renal section negative       Endo:  - neg endo ROS       Psychiatric: Comment: Hx of fibromyalgia        Infectious Disease:  - neg infectious disease ROS       Malignancy:   (+)   Hx of breast cancer s/p lumpectomy        Other:    (+) H/O Chronic Pain,H/O chronic opiod use ,                    Physical Exam  Normal systems: pulmonary and dental    Airway   Mallampati: II  TM distance: >3 FB  Neck ROM: full    Dental     Cardiovascular   Rhythm and rate: regular and normal      Pulmonary                         Anesthesia Plan      History & Physical Review  History and physical reviewed and following examination; no interval change.    ASA Status:  3 .    NPO Status:  > 8 hours    Plan for General and RSI with Intravenous induction. Maintenance will be Balanced.    PONV prophylaxis:  Ondansetron (or other 5HT-3) and Dexamethasone or Solumedrol  - ASA 3  - RSI, GA with standard ASA monitors, IV  induction, balanced anesthetic  - PIV  - Antibiotics per surgery  - PONV prophylaxis  - Pain management with Fentanyl/dilaudid boluses.  - Relevant risks, benefits, alternatives and the anesthetic plan were discussed with patient/family or family representative. All questions were answered and there was agreement to proceed.    Nisha Wright MD PGY4        Postoperative Care  Postoperative pain management:  IV analgesics.      Consents  Anesthetic plan, risks, benefits and alternatives discussed with:  Patient..             Allergies   Allergen Reactions     Amoxicillin Diarrhea     Ativan [Lorazepam] Other (See Comments)     Hallucinations     Hydromorphone Itching     4/12/17 - patient open to using this as she tolerated Hydromorphone PCA during hospitalization in January 2017.      Morphine Itching     Prescription Medications as of 10/2/2017             diphenoxylate-atropine (LOMOTIL) 2.5-0.025 MG per tablet Take 1 tablet by mouth 4 times daily as needed for diarrhea    oxyCODONE (ROXICODONE) 5 MG IR tablet Take 1 tablet (5 mg) by mouth every 4 hours as needed for pain or other (Moderate to Severe)    ondansetron (ZOFRAN-ODT) 4 MG ODT tab Take 1 tablet (4 mg) by mouth every 6 hours as needed for nausea or vomiting    oxyCODONE (ROXICODONE) 10 MG IR tablet Take 1 tablet (10 mg) by mouth every 6 hours as needed for moderate to severe pain    MELATONIN PO Take 5 mg by mouth At Bedtime    UNABLE TO FIND 2 nell supplement 4 cans daily per g tube    acetaminophen (TYLENOL) 32 mg/mL solution 30.45 mLs (975 mg) by Per Feeding Tube route 3 times daily    pantoprazole (PROTONIX) 40 MG EC tablet Take by mouth 30-60 minutes before a meal.    diphenoxylate-atropine (LOMOTIL) 2.5-0.025 MG/5ML liquid Take 5 mLs by mouth 4 times daily as needed for diarrhea    ferrous sulfate 300 (60 FE) MG/5ML syrup 5 mLs (300 mg) by Per J Tube route daily    clotrimazole (LOTRIMIN) 1 % cream Apply topically 2 times daily    chlorhexidine  (PERIDEX) 0.12 % solution Swish and spit 15 mLs in mouth 2 times daily    chlorhexidine (HIBICLENS) 4 % liquid Apply topically 2 times daily    fiber modular (NUTRISOURCE FIBER) packet 1 packet by Per Feeding Tube route 3 times daily (with meals)    traZODone (DESYREL) 100 MG tablet 0.5 tablets (50 mg) by Per G Tube route At Bedtime    order for DME Equipment being ordered:   [facility] [place] Suction Machine-Intermittent  Suction Canisters(2)  Suction Canister Holders(2)  Suction Connect Tube(2)  5 in 1 Connector(2)  Bacteria Filter(2)  Yaunkauer Suction(2)    Treatment Diagnosis: Esophogeal Perforation, s/p esophagectomy    order for DME Equipment being ordered:   Suction Pump  Suction Canister(2)  Suction Tubing(2)  Bacteria Filter(2)  Yaunkauer(4)  Red Rubber Catheter(2)    Treatment Diagnosis: Esophogeal Perforation, s/p esophagectomy    DiphenhydrAMINE HCl (BENADRYL PO) Take 25 mg by mouth nightly as needed    cholestyramine (QUESTRAN) 4 G Packet Take 1 packet by mouth daily    multivitamins with minerals (CERTAVITE/CEROVITE) LIQD liquid Take 15 mLs by mouth daily    loperamide (IMODIUM) 1 MG/5ML liquid Take 20 mLs (4 mg) by mouth 4 times daily as needed for diarrhea    Lactobacillus Rhamnosus, GG, (CULTURELLE PO) Take 1 tablet by mouth 2 times daily       Facility Administered Medications as of 10/2/2017             PRE OP antibiotics NOT needed for this surgical procedure. 1 each continuous    lidocaine 1 % 1 mL 1 mL by Other route every hour as needed (mild pain with VAD insertion or accessing implanted port)    lidocaine (LMX4) kit Apply topically every hour as needed for pain (with VAD insertion or accessing implanted port.)    sodium chloride (PF) 0.9% PF flush 3 mL 3 mLs by Intracatheter route every hour as needed for line flush (for peripheral IV flush post IV meds)    sodium chloride (PF) 0.9% PF flush 3 mL 3 mLs by Intracatheter route every 8 hours    lactated ringers infusion Inject into the vein continuous       No results found for this or any previous visit (from the past 4320 hour(s)).  PAST MEDICAL HISTORY:   Past Medical History:   Diagnosis Date     Arrhythmia     one time event; no longer on meds     Atrial fibrillation (H)      Breast cancer (H) 2007    right side - lumpectomy, chemo, and local radiation     Chronic infection     infection/wound for two years after esoph surgery     Esophageal perforation      Fibromyalgia      GERD (gastroesophageal reflux disease)      Hiatal hernia      History of blood transfusion      IBS (irritable bowel syndrome)      Meniere's disease     deaf left ear     MS (multiple sclerosis) (H)      Multiple sclerosis (H)      Noninfectious ileitis      Other chronic pain        PAST SURGICAL HISTORY:   Past Surgical History:   Procedure Laterality Date     APPENDECTOMY       BACK SURGERY  5/1/2015    lumbar fusion     BRONCHOSCOPY FLEXIBLE N/A 4/17/2017    Procedure: BRONCHOSCOPY FLEXIBLE;;  Surgeon: Jens Wise MD;  [facility]     C GASTROSTOMY/JEJUN TUBE      J tube for feedings     CLOSE SPIT FISTULA N/A 4/17/2017    Procedure: CLOSE SPIT FISTULA;;  Surgeon: Jens Wise MD;  [facility]     COMBINED ESOPHAGOSCOPY, GASTROSCOPY, DUODENOSCOPY (EGD), REMOVE ESOPHAGEAL STENT N/A 8/26/2016    Procedure: COMBINED ESOPHAGOSCOPY, GASTROSCOPY, DUODENOSCOPY (EGD), REMOVE ESOPHAGEAL STENT;  Surgeon: Jens Wise MD;  [facility]     COMBINED ESOPHAGOSCOPY, GASTROSCOPY, DUODENOSCOPY (EGD), REPLACE ESOPHAGEAL STENT N/A 8/25/2017    Procedure: COMBINED ESOPHAGOSCOPY, GASTROSCOPY, DUODENOSCOPY (EGD), REPLACE ESOPHAGEAL STENT;;  Surgeon: Jens Wise MD;  [facility]     COMBINED ESOPHAGOSCOPY, GASTROSCOPY, DUODENOSCOPY (EGD), REPLACE ESOPHAGEAL STENT N/A 9/15/2017    Procedure: COMBINED ESOPHAGOSCOPY, GASTROSCOPY, DUODENOSCOPY (EGD), REPLACE ESOPHAGEAL STENT;  Upper Endoscopy with Stent Exchange, Cauterization of  Tracheosophageal Fistula, Cauterization of Chest Wound   ;  Surgeon: Jens Wise MD;  [facility]     CREATE SPIT FISTULA N/A 1/9/2017    Procedure: CREATE SPIT FISTULA;  Surgeon: Jens Wise MD;  [facility]     ESOPHAGECTOMY N/A 1/9/2017    Procedure: ESOPHAGECTOMY;  Surgeon: Jens Wise MD;  [facility]     ESOPHAGOSCOPY, GASTROSCOPY, DUODENOSCOPY (EGD), COMBINED N/A 4/18/2016    Procedure: COMBINED ESOPHAGOSCOPY, GASTROSCOPY, DUODENOSCOPY (EGD);  Surgeon: Alexis Barraza MD;  [facility]     ESOPHAGOSCOPY, GASTROSCOPY, DUODENOSCOPY (EGD), COMBINED N/A 4/25/2016    Procedure: COMBINED ESOPHAGOSCOPY, GASTROSCOPY, DUODENOSCOPY (EGD);  Surgeon: Alexis Barraza MD;  [facility]     ESOPHAGOSCOPY, GASTROSCOPY, DUODENOSCOPY (EGD), COMBINED N/A 5/4/2016    Procedure: COMBINED ESOPHAGOSCOPY, GASTROSCOPY, DUODENOSCOPY (EGD);  Surgeon: Alexis Barraza MD;  [facility]     ESOPHAGOSCOPY, GASTROSCOPY, DUODENOSCOPY (EGD), COMBINED N/A 5/18/2016    Procedure: COMBINED ESOPHAGOSCOPY, GASTROSCOPY, DUODENOSCOPY (EGD);  Surgeon: Alexis Barraza MD;  [facility]     ESOPHAGOSCOPY, GASTROSCOPY, DUODENOSCOPY (EGD), COMBINED N/A 6/22/2016    Procedure: COMBINED ESOPHAGOSCOPY, GASTROSCOPY, DUODENOSCOPY (EGD);  Surgeon: Alexis Barraza MD;  [facility]     ESOPHAGOSCOPY, GASTROSCOPY, DUODENOSCOPY (EGD), COMBINED N/A 7/12/2016    Procedure: COMBINED ESOPHAGOSCOPY, GASTROSCOPY, DUODENOSCOPY (EGD);  Surgeon: Jens Wise MD;  [facility]     ESOPHAGOSCOPY, GASTROSCOPY, DUODENOSCOPY (EGD), COMBINED N/A 4/21/2017    Procedure: COMBINED ESOPHAGOSCOPY, GASTROSCOPY, DUODENOSCOPY (EGD);  Esophagogastroduodenoscopy, Pharyngostomy Tube Placement ;  Surgeon: Jens Wise MD;  [facility]     ESOPHAGOSCOPY, GASTROSCOPY, DUODENOSCOPY (EGD), COMBINED N/A 5/19/2017    Procedure: COMBINED  ESOPHAGOSCOPY, GASTROSCOPY, DUODENOSCOPY (EGD);  Upper Endoscopy, Irrigation and Debridement of Chest Wound ;  Surgeon: Nalini Barreto MD;  [facility]     ESOPHAGOSCOPY, GASTROSCOPY, DUODENOSCOPY (EGD), COMBINED N/A 5/23/2017    Procedure: COMBINED ESOPHAGOSCOPY, GASTROSCOPY, DUODENOSCOPY (EGD);;  Surgeon: Nalini Barreto MD;  [facility]     ESOPHAGOSCOPY, GASTROSCOPY, DUODENOSCOPY (EGD), COMBINED N/A 6/27/2017    Procedure: COMBINED ESOPHAGOSCOPY, GASTROSCOPY, DUODENOSCOPY (EGD);  Esophagogastroduodenoscopy With Removal And Replacement Of Esophageal Stent exchange. Exchange of pharyngtomy tube. Chest wound dressing change;  Surgeon: Nalini Barreto MD;  [facility]     ESOPHAGOSCOPY, GASTROSCOPY, DUODENOSCOPY (EGD), COMBINED N/A 8/4/2017    Procedure: COMBINED ESOPHAGOSCOPY, GASTROSCOPY, DUODENOSCOPY (EGD);  Esophagogastroduodenoscopy, Stent Removal and Stent Replacement. Dressing Change ;  Surgeon: Nalini Barreto MD;  [facility]     ESOPHAGOSCOPY, GASTROSCOPY, DUODENOSCOPY (EGD), COMBINED N/A 8/25/2017    Procedure: COMBINED ESOPHAGOSCOPY, GASTROSCOPY, DUODENOSCOPY (EGD);  Esophagogastroduodenoscopy,  Stent Revision,  Cauterization;  Surgeon: Jens Wise MD;  [facility]     ESOPHAGOSCOPY, GASTROSCOPY, DUODENOSCOPY (EGD), DILATATION, COMBINED N/A 6/29/2016    Procedure: COMBINED ESOPHAGOSCOPY, GASTROSCOPY, DUODENOSCOPY (EGD), DILATATION;  Surgeon: Jens Wise MD;  [facility]     EXPLORE NECK N/A 5/19/2017    Procedure: EXPLORE NECK;;  Surgeon: Nalini Barreto MD;  [facility]     HC UGI ENDOSCOPY W TRANSENDOSCOPIC STENT PLACEMENT N/A 7/12/2016    Procedure: COMBINED ESOPHAGOSCOPY, GASTROSCOPY, DUODENOSCOPY (EGD), PLACE TRANSENDOSCOPIC ESOPHAGEAL STENT;  Surgeon: Jens Wise MD;  [facility]      UGI ENDOSCOPY W TRANSENDOSCOPIC STENT PLACEMENT N/A 7/22/2016    Procedure: COMBINED ESOPHAGOSCOPY, GASTROSCOPY, DUODENOSCOPY (EGD), PLACE  TRANSENDOSCOPIC ESOPHAGEAL STENT;  Surgeon: Jens Wise MD;  [facility]     INCISION AND DRAINAGE CHEST WASHOUT, COMBINED N/A 5/27/2017    Procedure: COMBINED INCISION AND DRAINAGE CHEST WASHOUT;  Chest washout;  Surgeon: Nalini Barreto MD;  [facility]     INCISION AND DRAINAGE CHEST WASHOUT, COMBINED N/A 5/28/2017    Procedure: COMBINED INCISION AND DRAINAGE CHEST WASHOUT;  Chest washout;  Surgeon: Nailni Barreto MD;  [facility]     INCISION AND DRAINAGE CHEST WASHOUT, COMBINED N/A 6/9/2017    Procedure: COMBINED INCISION AND DRAINAGE CHEST WASHOUT;  Chest washout and dressing change, Esophaogastroduodenoscopy;  Surgeon: Jens Wise MD;  [facility]     INCISION AND DRAINAGE CHEST WASHOUT, COMBINED N/A 7/17/2017    Procedure: COMBINED INCISION AND DRAINAGE CHEST WASHOUT;  Incision and Drainage of right Chest Wound, Esophagogastroduodenoscopy with stent exchange. pharangostomy tube revision;  Surgeon: Jens Wise MD;  [facility]     IRRIGATION AND DEBRIDEMENT CHEST WASHOUT, COMBINED N/A 6/29/2016    Procedure: COMBINED IRRIGATION AND DEBRIDEMENT CHEST WASHOUT;  Surgeon: Jens Wise MD;  [facility]     IRRIGATION AND DEBRIDEMENT CHEST WASHOUT, COMBINED N/A 5/23/2017    Procedure: COMBINED IRRIGATION AND DEBRIDEMENT CHEST WASHOUT;  COMBINED IRRIGATION AND DEBRIDEMENT CHEST WASHOUT,COMBINED ESOPHAGOSCOPY, GASTROSCOPY, DUODENOSCOPY (EGD) ;  Surgeon: Nalini Barreto MD;  [facility]     IRRIGATION AND DEBRIDEMENT CHEST WASHOUT, COMBINED N/A 5/24/2017    Procedure: COMBINED IRRIGATION AND DEBRIDEMENT CHEST WASHOUT;  Chest wound Washout and Dressing Change;  Surgeon: Nalini Barreto MD;  [facility]     IRRIGATION AND DEBRIDEMENT CHEST WASHOUT, COMBINED N/A 5/25/2017    Procedure: COMBINED IRRIGATION AND DEBRIDEMENT CHEST WASHOUT;  Irrigation and Debridement Chest Washout and dressing change;  Surgeon: Nalini Barreto MD;   [facility]     IRRIGATION AND DEBRIDEMENT CHEST WASHOUT, COMBINED N/A 5/26/2017    Procedure: COMBINED IRRIGATION AND DEBRIDEMENT CHEST WASHOUT;  Irrigation and Debridement Chest Washout with  dressing change;  Surgeon: Nalini Barreto MD;  [facility]     IRRIGATION AND DEBRIDEMENT CHEST WASHOUT, COMBINED N/A 5/30/2017    Procedure: COMBINED IRRIGATION AND DEBRIDEMENT CHEST WASHOUT;  Irrigation and Debridement Chest Washout , PICC line dressing change;  Surgeon: Nalini Barreto MD;  [facility]     IRRIGATION AND DEBRIDEMENT CHEST WASHOUT, COMBINED N/A 5/31/2017    Procedure: COMBINED IRRIGATION AND DEBRIDEMENT CHEST WASHOUT;  Irrigation and Debridement Chest Washout ;  Surgeon: Nalini Barreto MD;  [facility]     IRRIGATION AND DEBRIDEMENT CHEST WASHOUT, COMBINED N/A 6/1/2017    Procedure: COMBINED IRRIGATION AND DEBRIDEMENT CHEST WASHOUT;  Chest Wound Irrigation And Debridement with dressing change ;  Surgeon: Nalini Barreto MD;  [facility]     IRRIGATION AND DEBRIDEMENT CHEST WASHOUT, COMBINED N/A 6/4/2017    Procedure: COMBINED IRRIGATION AND DEBRIDEMENT CHEST WASHOUT;  COMBINED IRRIGATION AND DEBRIDEMENT CHEST WASHOUT, Esophagoscopy, Gastroscopy, Duodenoscopy (EGD) place transendoscopic esophageal stent,   Pharyngostomy and chest wall tube exchange  C-Arm;  Surgeon: Jens Wise MD;  [facility]     IRRIGATION AND DEBRIDEMENT CHEST WASHOUT, COMBINED N/A 6/2/2017    Procedure: COMBINED IRRIGATION AND DEBRIDEMENT CHEST WASHOUT;  Chest Wound Irrigation and Debridement, Dressing Change;  Surgeon: Nalini Barreto MD;  [facility]     LAPAROSCOPIC ASSISTED INSERTION TUBE JEJUNOSTOMY N/A 4/17/2017    Procedure: LAPAROSCOPIC ASSISTED INSERTION TUBE JEJUNOSTOMY;;  Surgeon: Jens Wise MD;  [facility]     LAPAROSCOPY DIAGNOSTIC (GENERAL) N/A 1/9/2017    Procedure: LAPAROSCOPY DIAGNOSTIC (GENERAL);  Surgeon: Jens Wise MD;  [facility]      "LAPAROSCOPY DIAGNOSTIC (GENERAL) N/A 4/17/2017    Procedure: LAPAROSCOPY DIAGNOSTIC (GENERAL);  Laparoscopic , Neck Dissection, Spit Fistula Takedown, Laparoscopic Jejunostomy Tube and Pharyngostomy Tube, Gastric Pull up, Upper Endoscopy(EGD)  , Flexible Bronchoscopy ,Sternotomy ;  Surgeon: Jens Wise MD;  [facility]     LUMPECTOMY BREAST Right 2007     NERVE BLOCK PERIPHERAL N/A 8/30/2016    Procedure: NERVE BLOCK PERIPHERAL;  Surgeon: GENERIC ANESTHESIA PROVIDER;  [facility]     NISSEN FUNDOPLICATION  1/2016     PHARYNGOSTOMY N/A 4/18/2016    Procedure: PHARYNGOSTOMY;  Surgeon: Alexis Barraza MD;  [facility]     PHARYNGOSTOMY N/A 4/25/2016    Procedure: PHARYNGOSTOMY;  Surgeon: Alexis Barraza MD;  [facility]     PHARYNGOSTOMY N/A 5/4/2016    Procedure: PHARYNGOSTOMY;  Surgeon: Alexis Barraza MD;  [facility]     PHARYNGOSTOMY N/A 6/22/2016    Procedure: PHARYNGOSTOMY;  Surgeon: Alexis Barraza MD;  [facility]     PHARYNGOSTOMY N/A 6/29/2016    Procedure: PHARYNGOSTOMY;  Surgeon: Jens Wise MD;  [facility]     PHARYNGOSTOMY N/A 4/21/2017    Procedure: PHARYNGOSTOMY;;  Surgeon: Jens Wise MD;  [facility]     PHARYNGOSTOMY Left 5/31/2017    Procedure: PHARYNGOSTOMY;;  Surgeon: Nalini Barreto MD;  [facility]     PICC INSERTION Left 8/25/2016    5fr DL BioFlo PICC, 42cm (3cm external) in the L medial brachial vein w/ tip in the SVC RA junction.     PICC INSERTION - Rewire Right 05/31/2017    5fr DL BioFlo PICC, 40cm (6cm external) in the R medial brachial vein w/ tip in the SVC RA junction.     REPAIR FISTULA TRACHEOESOPHAGEAL N/A 9/15/2017    Procedure: REPAIR FISTULA TRACHEOESOPHAGEAL;;  Surgeon: Jens Wise MD;  [facility]     THORACOTOMY Right 1998    lung infection - \"hard crust formed on lung\"     THORACOTOMY Left 1/9/2017    Procedure: THORACOTOMY;  Surgeon: " Jens Wise MD;  [facility]     WRIST SURGERY         FAMILY HISTORY:   Family History   Problem Relation Age of Onset     Alzheimer Disease Mother      CEREBROVASCULAR DISEASE Father      cerebral hemorrhage     Ovarian Cancer Sister      Breast Cancer Daughter        SOCIAL HISTORY:   Social History   Substance Use Topics     Smoking status: Former Smoker     Packs/day: 1.00     Years: 15.00     Types: Cigarettes     Quit date: 1/1/1998     Smokeless tobacco: Never Used     Alcohol use No     Lab Results   Component Value Date    WBC 5.8 07/07/2017    HGB 10.5 (L) 09/15/2017    HCT 31.1 (L) 07/07/2017     07/07/2017    CHOL 91 01/14/2017    TRIG 51 07/03/2017    HDL 18 (L) 01/14/2017    ALT 26 06/26/2017    AST 18 06/26/2017     07/07/2017    BUN 18 07/07/2017    CO2 29 07/07/2017    INR 0.98 09/15/2017       (Not in a hospital admission)                .

## 2017-10-03 LAB — COPATH REPORT: NORMAL

## 2017-10-04 ENCOUNTER — HOSPITAL ENCOUNTER (OUTPATIENT)
Dept: PALLIATIVE MEDICINE | Facility: OTHER | Age: 72
Discharge: HOME | End: 2017-10-04
Attending: FAMILY MEDICINE

## 2017-10-04 DIAGNOSIS — G89.4 CHRONIC PAIN SYNDROME: ICD-10-CM

## 2017-10-04 DIAGNOSIS — G89.29 CHRONIC INTRACTABLE PAIN: ICD-10-CM

## 2017-10-04 ASSESSMENT — MIFFLIN-ST. JEOR: SCORE: 883.7

## 2017-10-05 DIAGNOSIS — K91.89 ESOPHAGEAL ANASTOMOTIC LEAK: ICD-10-CM

## 2017-10-05 RX ORDER — OXYCODONE HYDROCHLORIDE 5 MG/1
5 TABLET ORAL SEE ADMIN INSTRUCTIONS
Qty: 84 TABLET | Refills: 0 | Status: SHIPPED | OUTPATIENT
Start: 2017-10-05 | End: 2017-10-19

## 2017-10-11 NOTE — PROGRESS NOTES
THORACIC SURGERY FOLLOW UP VISIT      Dear Dr. Carroll,  I saw . Minerva Blanco in follow-up today. The clinical summary follows:      PREOP DIAGNOSIS   1.  Chronic esophageal perforation with mediastinal phlegmon.    2.  Status post fundoplication.        PROCEDURE   1/9/2017  1.  Esophagogastroscopy.    2.  Laparoscopic lysis of adhesions.    3.  Laparoscopic proximal gastrectomy and gastrostomy tube placement.    4.  Left thoracotomy with excision of mediastinal phlegmon and distal esophagectomy.    5.  Thoracic duct ligation.    6.  Right tube thoracostomy.    7.  Left esophageal spit fistula.    8.  Bronchoscopy.        6/9/2017  1. Esophagogastroscopy  2. Esophageal stent placement      7/17/2017  1. Esophagogastroduodenoscopy with stent exchange  2. Pharyngostomy tube revision     Multiple EGD, stent revision and cauterization of esophagocutaneous fistula (most recent: 10/2/2017)      PRIOR PROCEDURES  1) Multiple endoscopies and pharyngostomy tube placement x 2 (for drainage of paraesophageal cavity)  2) Esophageal stent placement, attempted placement of biliary stent into the paraesophageal cavity (7/22/2016)  3) Esophageal stent removal, placement of retrograde gastroesophageal tube (10/23/2016)  4) Toupet fundoplication (1/2016)          COMPLICATIONS  Thoracotomy wound infection requiring antibiotics      INTERVAL STUDIES  None      TOB never  ETOH negative      SUBJECTIVE        From a personal perspective, she is here with her  Enrique.          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 Minerva Blanco and Enrique, more than 50% of which were spent in counseling, coordination of care, and face-to-face time. I reviewed the plan as follows:  1) Weekly  cauterization of wound and bi-weekly endoscopic cauterization of fistulous tract  2) CXR to con firm position of stent  3) Scheduled for shoulder surgery next Wednesday which she will proceed with.

## 2017-10-11 NOTE — PROGRESS NOTES
This is a recent snapshot of the patient's [facility] medical record.  For current drug dose and complete information and questions, call [phone] or In Basket pool, [facility] ([id number])  CSN Number:  [id number]

## 2017-10-12 ENCOUNTER — OFFICE VISIT (OUTPATIENT)
Dept: SURGERY | Facility: CLINIC | Age: 72
End: 2017-10-12
Attending: STUDENT IN AN ORGANIZED HEALTH CARE EDUCATION/TRAINING PROGRAM
Payer: MEDICARE

## 2017-10-12 VITALS
TEMPERATURE: 98.3 F | DIASTOLIC BLOOD PRESSURE: 72 MMHG | WEIGHT: 93.7 LBS | OXYGEN SATURATION: 98 % | HEIGHT: 60 IN | RESPIRATION RATE: 16 BRPM | SYSTOLIC BLOOD PRESSURE: 115 MMHG | BODY MASS INDEX: 18.4 KG/M2 | HEART RATE: 83 BPM

## 2017-10-12 DIAGNOSIS — K22.3 ESOPHAGEAL PERFORATION: Primary | ICD-10-CM

## 2017-10-12 PROCEDURE — 99212 OFFICE O/P EST SF 10 MIN: CPT | Mod: ZF

## 2017-10-12 RX ORDER — HYDROXYZINE PAMOATE 25 MG/1
25 CAPSULE ORAL PRN
COMMUNITY
Start: 2017-10-04 | End: 2018-01-31 | Stop reason: ALTCHOICE

## 2017-10-12 RX ORDER — TIZANIDINE HYDROCHLORIDE 4 MG/1
4 CAPSULE, GELATIN COATED ORAL 3 TIMES DAILY
COMMUNITY
Start: 2017-10-04 | End: 2017-11-15

## 2017-10-12 ASSESSMENT — PAIN SCALES - GENERAL: PAINLEVEL: SEVERE PAIN (7)

## 2017-10-12 NOTE — MR AVS SNAPSHOT
After Visit Summary   10/12/2017    Minerva Blanco    MRN: [id number]           Patient Information     Date Of Birth          1945        Visit Information        Provider Department      10/12/2017 11:30 AM Nalini Barreto MD [facility]        Today's Diagnoses     Esophageal perforation    -  1       Follow-ups after your visit        Your next 10 appointments already scheduled     Oct 20, 2017   Procedure with Nalini Barreto MD   [facility] [place], Same Day Surgery (--)    [address]  [place] [address] [phone]              Who to contact     If you have questions or need follow up information about today's clinic visit or your schedule please contact [facility] directly at [phone].  Normal or non-critical lab and imaging results will be communicated to you by MyChart, letter or phone within 4 business days after the clinic has received the results. If you do not hear from us within 7 days, please contact the clinic through MyChart or phone. If you have a critical or abnormal lab result, we will notify you by phone as soon as possible.  Submit refill requests through [employer] or call your pharmacy and they will forward the refill request to us. Please allow 3 business days for your refill to be completed.          Additional Information About Your Visit        MyChart Information     [employer] gives you secure access to your electronic health record. If you see a primary care provider, you can also send messages to your care team and make appointments. If you have questions, please call your primary care clinic.  If you do not have a primary care provider, please call [phone] and they will assist you.        Care EveryWhere ID     This is your Care EveryWhere ID. This could be used by other organizations to access your [place] medical records  [id number]        Your Vitals Were     Pulse Temperature Respirations Height Pulse Oximetry  "BMI (Body Mass Index)    83 98.3  F (36.8  C) (Oral) 16 1.52 m (4' 11.84\") 98% 18.4 kg/m2       Blood Pressure from Last 3 Encounters:   10/12/17 115/72   10/02/17 123/58   09/20/17 123/74    Weight from Last 3 Encounters:   10/12/17 42.5 kg (93 lb 11.2 oz)   10/02/17 41.2 kg (90 lb 13.3 oz)   09/20/17 42.6 kg (93 lb 14.4 oz)              We Performed the Following     Shirley-Operative Worksheet (Thoracic)          Today's Medication Changes          These changes are accurate as of: 10/12/17  6:42 PM.  If you have any questions, ask your nurse or doctor.               These medicines have changed or have updated prescriptions.        Dose/Directions    acetaminophen 32 mg/mL solution   Commonly known as:  TYLENOL   Indication:  Pain   This may have changed:    - when to take this  - reasons to take this   Used for:  Esophageal perforation        Dose:  975 mg   30.45 mLs (975 mg) by Per Feeding Tube route 3 times daily   Quantity:  300 mL   Refills:  1       * diphenoxylate-atropine 2.5-0.025 MG/5ML liquid   Commonly known as:  LOMOTIL   This may have changed:  when to take this   Used for:  Bowel habit changes        Dose:  5 mL   Take 5 mLs by mouth 4 times daily as needed for diarrhea   Quantity:  300 mL   Refills:  0       * diphenoxylate-atropine 2.5-0.025 MG per tablet   Commonly known as:  LOMOTIL   This may have changed:  when to take this   Used for:  Esophageal anastomotic leak        Dose:  1 tablet   Take 1 tablet by mouth 4 times daily as needed for diarrhea   Quantity:  40 tablet   Refills:  1       loperamide 1 MG/5ML liquid   Commonly known as:  IMODIUM   This may have changed:  when to take this   Used for:  Bowel habit changes        Dose:  4 mg   Take 20 mLs (4 mg) by mouth 4 times daily as needed for diarrhea   Quantity:  200 mL   Refills:  0       pantoprazole 40 MG EC tablet   Commonly known as:  PROTONIX   This may have changed:    - how much to take  - how to take this  - when to take this  - " additional instructions   Used for:  Gastroesophageal reflux disease, esophagitis presence not specified        Take by mouth 30-60 minutes before a meal.   Quantity:  30 tablet   Refills:  0       traZODone 100 MG tablet   Commonly known as:  DESYREL   This may have changed:  how much to take   Used for:  Insomnia, unspecified type        Dose:  50 mg   0.5 tablets (50 mg) by Per G Tube route At Bedtime   Quantity:  30 tablet   Refills:  0       * Notice:  This list has 2 medication(s) that are the same as other medications prescribed for you. Read the directions carefully, and ask your doctor or other care provider to review them with you.             Primary Care Provider Office Phone # Fax #    Andrea Nino [phone]       [facility] [address] [address]        Equal Access to Services     TOM JORGENSEN : Dora Meng, waaxsana luqadaha, qaybta kaalmada adechavezyasana, florentin amaya . So [facility] [phone].    ATENCIÓN: Si habla español, tiene a jackson disposición servicios gratuitos de asistencia lingüística. [facility] [phone].    We comply with applicable federal civil rights laws and [place] laws. We do not discriminate on the basis of race, color, national origin, age, disability, sex, sexual orientation, or gender identity.            Thank you!     Thank you for choosing [facility] CLINIC  for your care. Our goal is always to provide you with excellent care. Hearing back from our patients is one way we can continue to improve our services. Please take a few minutes to complete the written survey that you may receive in the mail after your visit with us. Thank you!             Your Updated Medication List - Protect others around you: Learn how to safely use, store and throw away your medicines at www.disposemymeds.org.          This list is accurate as of: 10/12/17  6:42 PM.  Always use your most recent med  list.                   Brand Name Dispense Instructions for use Diagnosis    acetaminophen 32 mg/mL solution    TYLENOL    300 mL    30.45 mLs (975 mg) by Per Feeding Tube route 3 times daily    Esophageal perforation       BENADRYL PO      Take 25 mg by mouth nightly as needed        chlorhexidine 0.12 % solution    PERIDEX     Swish and spit 15 mLs in mouth 2 times daily    Esophageal perforation       chlorhexidine 4 % liquid    HIBICLENS    200 mL    Apply topically 2 times daily    History of esophagectomy       cholestyramine 4 G Packet    QUESTRAN     Take 1 packet by mouth daily        clotrimazole 1 % cream    LOTRIMIN    12 g    Apply topically 2 times daily    Wound abscess, subsequent encounter       CULTURELLE PO      Take 1 tablet by mouth 2 times daily        * diphenoxylate-atropine 2.5-0.025 MG/5ML liquid    LOMOTIL    300 mL    Take 5 mLs by mouth 4 times daily as needed for diarrhea    Bowel habit changes       * diphenoxylate-atropine 2.5-0.025 MG per tablet    LOMOTIL    40 tablet    Take 1 tablet by mouth 4 times daily as needed for diarrhea    Esophageal anastomotic leak       ferrous sulfate 300 (60 FE) MG/5ML syrup     150 mL    5 mLs (300 mg) by Per J Tube route daily    Anemia due to other cause       fiber modular packet     60 packet    1 packet by Per Feeding Tube route 3 times daily (with meals)    History of esophagectomy       hydrOXYzine 25 MG capsule    VISTARIL     Take 25 mg by mouth as needed        loperamide 1 MG/5ML liquid    IMODIUM    200 mL    Take 20 mLs (4 mg) by mouth 4 times daily as needed for diarrhea    Bowel habit changes       MELATONIN PO      Take 5 mg by mouth At Bedtime        multivitamins with minerals Liqd liquid      Take 15 mLs by mouth daily        ondansetron 4 MG ODT tab    ZOFRAN-ODT    40 tablet    Take 1 tablet (4 mg) by mouth every 6 hours as needed for nausea or vomiting    Esophageal anastomotic leak       * order for DME     1 Device     Equipment being ordered:  [facility] Suction Machine-Intermittent Suction Canisters(2) Suction Canister Holders(2) Suction Connect Tube(2) 5 in 1 Connector(2) Bacteria Filter(2) Yaunkauer Suction(2)  Treatment Diagnosis: Esophogeal Perforation, s/p esophagectomy    Hx of esophagectomy       * order for DME     1 Device    Equipment being ordered:  Suction Pump Suction Canister(2) Suction Tubing(2) Bacteria Filter(2) [employer]unkauer(4) Red Rubber Catheter(2)  Treatment Diagnosis: Esophogeal Perforation, s/p esophagectomy    Esophageal perforation       * oxyCODONE 10 MG IR tablet    ROXICODONE    64 tablet    Take 1 tablet (10 mg) by mouth every 6 hours as needed for moderate to severe pain    Other chronic pain       * oxyCODONE 5 MG/5ML solution    ROXICODONE    75 mL    5 mLs (5 mg) by Per Feeding Tube route every 4 hours as needed for pain    Esophageal perforation       * oxyCODONE 5 MG IR tablet    ROXICODONE    84 tablet    Take 1 tablet (5 mg) by mouth See Admin Instructions One tablet every 4-6 hours as needed for pain    Esophageal anastomotic leak       pantoprazole 40 MG EC tablet    PROTONIX    30 tablet    Take by mouth 30-60 minutes before a meal.    Gastroesophageal reflux disease, esophagitis presence not specified       tiZANidine 4 MG capsule    ZANAFLEX     Take 4 mg by mouth 3 times daily        traZODone 100 MG tablet    DESYREL    30 tablet    0.5 tablets (50 mg) by Per G Tube route At Bedtime    Insomnia, unspecified type       UNABLE TO FIND      2 nell supplement 4 cans daily per g tube        * Notice:  This list has 7 medication(s) that are the same as other medications prescribed for you. Read the directions carefully, and ask your doctor or other care provider to review them with you.

## 2017-10-12 NOTE — LETTER
10/12/2017       RE: Minerva Blanco  [address]   [facility] [address]     Dear Colleague,    Thank you for referring your patient, Minerva Blanco, to the [facility]. Please see a copy of my visit note below.    THORACIC SURGERY FOLLOW UP VISIT      Dear Dr. Carroll,  I saw . Minerva Blanco in follow-up today. The clinical summary follows:      PREOP DIAGNOSIS   1.  Chronic esophageal perforation with mediastinal phlegmon.    2.  Status post fundoplication.        PROCEDURE   1/9/2017  1.  Esophagogastroscopy.    2.  Laparoscopic lysis of adhesions.    3.  Laparoscopic proximal gastrectomy and gastrostomy tube placement.    4.  Left thoracotomy with excision of mediastinal phlegmon and distal esophagectomy.    5.  Thoracic duct ligation.    6.  Right tube thoracostomy.    7.  Left esophageal spit fistula.    8.  Bronchoscopy.        6/9/2017  1. Esophagogastroscopy  2. Esophageal stent placement      7/17/2017  1. Esophagogastroduodenoscopy with stent exchange  2. Pharyngostomy tube revision     Multiple EGD, stent revision and cauterization of esophagocutaneous fistula (most recent: 10/2/2017)      PRIOR PROCEDURES  1) Multiple endoscopies and pharyngostomy tube placement x 2 (for drainage of paraesophageal cavity)  2) Esophageal stent placement, attempted placement of biliary stent into the paraesophageal cavity (7/22/2016)  3) Esophageal stent removal, placement of retrograde gastroesophageal tube (10/23/2016)  4) Toupet fundoplication (1/2016)          COMPLICATIONS  Thoracotomy wound infection requiring antibiotics      INTERVAL STUDIES  None      TOB never  ETOH negative      SUBJECTIVE        From a personal perspective, she is here with her  Enrique.          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 Minerva Blanco and Enrique, more than 50% of which were spent in counseling, coordination of care, and face-to-face time. I reviewed the plan as follows:  1) Weekly cauterization of wound and bi-weekly endoscopic cauterization of fistulous tract  2) CXR to con firm position of stent  3) Scheduled for shoulder surgery next Wednesday which she will proceed with.      Sincerely,    Nalini Barreto MD

## 2017-10-12 NOTE — NURSING NOTE
"Oncology Rooming Note    October 12, 2017 11:27 AM   Minerva Blanco is a 72 year old female who presents for:    Chief Complaint   Patient presents with     Oncology Clinic Visit     Esophageal F/U     Initial Vitals: /72  Pulse 83  Temp 98.3  F (36.8  C) (Oral)  Resp 16  Ht 1.52 m (4' 11.84\")  Wt 42.5 kg (93 lb 11.2 oz)  SpO2 98%  BMI 18.4 kg/m2 Estimated body mass index is 18.4 kg/(m^2) as calculated from the following:    Height as of this encounter: 1.52 m (4' 11.84\").    Weight as of this encounter: 42.5 kg (93 lb 11.2 oz). Body surface area is 1.34 meters squared.  Severe Pain (7) Comment: Left Chest   No LMP recorded. Patient is postmenopausal.  Allergies reviewed: Yes  Medications reviewed: Yes    Medications: MEDICATION REFILLS NEEDED TODAY. Provider was notified.  Pharmacy name entered into [employer]:    [id number] - [place], [place] - [address] [facility] [place], [place] - [address]    Clinical concerns: Iron and Multi Vit Dr Barreto was notified.    7 minutes for nursing intake (face to face time)     Melodie Reyna LPN              "

## 2017-10-17 ENCOUNTER — HOSPITAL ENCOUNTER (OUTPATIENT)
Dept: PALLIATIVE MEDICINE | Facility: OTHER | Age: 72
Discharge: HOME | End: 2017-10-17
Attending: NURSE PRACTITIONER

## 2017-10-17 DIAGNOSIS — G89.4 CHRONIC PAIN SYNDROME: ICD-10-CM

## 2017-10-17 ASSESSMENT — MIFFLIN-ST. JEOR: SCORE: 883.7

## 2017-10-18 ENCOUNTER — COMMUNICATION - HEALTHEAST (OUTPATIENT)
Dept: PALLIATIVE MEDICINE | Facility: OTHER | Age: 72
End: 2017-10-18

## 2017-10-18 DIAGNOSIS — G89.4 CHRONIC PAIN SYNDROME: ICD-10-CM

## 2017-10-19 ENCOUNTER — ANESTHESIA EVENT (OUTPATIENT)
Dept: SURGERY | Facility: CLINIC | Age: 72
End: 2017-10-19
Payer: MEDICARE

## 2017-10-19 DIAGNOSIS — K91.89 ESOPHAGEAL ANASTOMOTIC LEAK: ICD-10-CM

## 2017-10-19 RX ORDER — OXYCODONE HYDROCHLORIDE 5 MG/1
5 TABLET ORAL SEE ADMIN INSTRUCTIONS
Qty: 84 TABLET | Refills: 0 | Status: SHIPPED | OUTPATIENT
Start: 2017-10-19 | End: 2019-05-08

## 2017-10-19 RX ORDER — OXYCODONE HYDROCHLORIDE 5 MG/1
5 TABLET ORAL SEE ADMIN INSTRUCTIONS
Qty: 84 TABLET | Refills: 0 | Status: SHIPPED | OUTPATIENT
Start: 2017-10-19 | End: 2017-10-19

## 2017-10-20 ENCOUNTER — HOSPITAL ENCOUNTER (OUTPATIENT)
Facility: CLINIC | Age: 72
Discharge: HOME | End: 2017-10-20
Attending: STUDENT IN AN ORGANIZED HEALTH CARE EDUCATION/TRAINING PROGRAM | Admitting: STUDENT IN AN ORGANIZED HEALTH CARE EDUCATION/TRAINING PROGRAM
Payer: MEDICARE

## 2017-10-20 ENCOUNTER — ANESTHESIA (OUTPATIENT)
Dept: SURGERY | Facility: CLINIC | Age: 72
End: 2017-10-20
Payer: MEDICARE

## 2017-10-20 ENCOUNTER — APPOINTMENT (OUTPATIENT)
Dept: GENERAL RADIOLOGY | Facility: CLINIC | Age: 72
End: 2017-10-20
Attending: STUDENT IN AN ORGANIZED HEALTH CARE EDUCATION/TRAINING PROGRAM
Payer: MEDICARE

## 2017-10-20 VITALS
OXYGEN SATURATION: 97 % | WEIGHT: 94.58 LBS | HEART RATE: 72 BPM | DIASTOLIC BLOOD PRESSURE: 64 MMHG | TEMPERATURE: 98.1 F | SYSTOLIC BLOOD PRESSURE: 115 MMHG | HEIGHT: 60 IN | RESPIRATION RATE: 17 BRPM | BODY MASS INDEX: 18.57 KG/M2

## 2017-10-20 DIAGNOSIS — K91.89 ESOPHAGEAL ANASTOMOTIC LEAK: Primary | ICD-10-CM

## 2017-10-20 DIAGNOSIS — K22.3 ESOPHAGEAL PERFORATION: ICD-10-CM

## 2017-10-20 LAB
GLUCOSE BLDC GLUCOMTR-MCNC: 84 MG/DL (ref 70–99)
HGB BLD-MCNC: 9.1 G/DL (ref 11.7–15.7)
POTASSIUM SERPL-SCNC: 4 MMOL/L (ref 3.4–5.3)

## 2017-10-20 PROCEDURE — A9270 NON-COVERED ITEM OR SERVICE: HCPCS | Mod: GY | Performed by: STUDENT IN AN ORGANIZED HEALTH CARE EDUCATION/TRAINING PROGRAM

## 2017-10-20 PROCEDURE — 40000170 ZZH STATISTIC PRE-PROCEDURE ASSESSMENT II: Performed by: STUDENT IN AN ORGANIZED HEALTH CARE EDUCATION/TRAINING PROGRAM

## 2017-10-20 PROCEDURE — 27210794 ZZH OR GENERAL SUPPLY STERILE: Performed by: STUDENT IN AN ORGANIZED HEALTH CARE EDUCATION/TRAINING PROGRAM

## 2017-10-20 PROCEDURE — 25000132 ZZH RX MED GY IP 250 OP 250 PS 637: Mod: GY | Performed by: STUDENT IN AN ORGANIZED HEALTH CARE EDUCATION/TRAINING PROGRAM

## 2017-10-20 PROCEDURE — 71000014 ZZH RECOVERY PHASE 1 LEVEL 2 FIRST HR: Performed by: STUDENT IN AN ORGANIZED HEALTH CARE EDUCATION/TRAINING PROGRAM

## 2017-10-20 PROCEDURE — 40000556 ZZH STATISTIC PERIPHERAL IV START W US GUIDANCE

## 2017-10-20 PROCEDURE — C9399 UNCLASSIFIED DRUGS OR BIOLOG: HCPCS | Performed by: NURSE ANESTHETIST, CERTIFIED REGISTERED

## 2017-10-20 PROCEDURE — 85018 HEMOGLOBIN: CPT | Performed by: ANESTHESIOLOGY

## 2017-10-20 PROCEDURE — 25000128 H RX IP 250 OP 636: Performed by: NURSE ANESTHETIST, CERTIFIED REGISTERED

## 2017-10-20 PROCEDURE — 25000125 ZZHC RX 250: Performed by: NURSE ANESTHETIST, CERTIFIED REGISTERED

## 2017-10-20 PROCEDURE — 25000566 ZZH SEVOFLURANE, EA 15 MIN: Performed by: STUDENT IN AN ORGANIZED HEALTH CARE EDUCATION/TRAINING PROGRAM

## 2017-10-20 PROCEDURE — 84132 ASSAY OF SERUM POTASSIUM: CPT | Performed by: ANESTHESIOLOGY

## 2017-10-20 PROCEDURE — 37000009 ZZH ANESTHESIA TECHNICAL FEE, EACH ADDTL 15 MIN: Performed by: STUDENT IN AN ORGANIZED HEALTH CARE EDUCATION/TRAINING PROGRAM

## 2017-10-20 PROCEDURE — 37000008 ZZH ANESTHESIA TECHNICAL FEE, 1ST 30 MIN: Performed by: STUDENT IN AN ORGANIZED HEALTH CARE EDUCATION/TRAINING PROGRAM

## 2017-10-20 PROCEDURE — 40000277 XR SURGERY CARM FLUORO LESS THAN 5 MIN W STILLS: Mod: TC

## 2017-10-20 PROCEDURE — C1874 STENT, COATED/COV W/DEL SYS: HCPCS | Performed by: STUDENT IN AN ORGANIZED HEALTH CARE EDUCATION/TRAINING PROGRAM

## 2017-10-20 PROCEDURE — 36000053 ZZH SURGERY LEVEL 2 EA 15 ADDTL MIN - UMMC: Performed by: STUDENT IN AN ORGANIZED HEALTH CARE EDUCATION/TRAINING PROGRAM

## 2017-10-20 PROCEDURE — 71000027 ZZH RECOVERY PHASE 2 EACH 15 MINS: Performed by: STUDENT IN AN ORGANIZED HEALTH CARE EDUCATION/TRAINING PROGRAM

## 2017-10-20 PROCEDURE — 36000055 ZZH SURGERY LEVEL 2 W FLUORO 1ST 30 MIN - UMMC: Performed by: STUDENT IN AN ORGANIZED HEALTH CARE EDUCATION/TRAINING PROGRAM

## 2017-10-20 PROCEDURE — C1769 GUIDE WIRE: HCPCS | Performed by: STUDENT IN AN ORGANIZED HEALTH CARE EDUCATION/TRAINING PROGRAM

## 2017-10-20 PROCEDURE — 82962 GLUCOSE BLOOD TEST: CPT

## 2017-10-20 PROCEDURE — 25000128 H RX IP 250 OP 636: Performed by: ANESTHESIOLOGY

## 2017-10-20 DEVICE — STENT ESOPHAGEAL ENDOMAXX 19X120MM MAXX-1912
Type: IMPLANTABLE DEVICE | Site: ESOPHAGUS | Status: NON-FUNCTIONAL
Removed: 2017-11-03

## 2017-10-20 RX ORDER — HYDROMORPHONE HYDROCHLORIDE 1 MG/ML
.3-.5 INJECTION, SOLUTION INTRAMUSCULAR; INTRAVENOUS; SUBCUTANEOUS EVERY 10 MIN PRN
Status: DISCONTINUED | OUTPATIENT
Start: 2017-10-20 | End: 2017-10-20 | Stop reason: HOSPADM

## 2017-10-20 RX ORDER — FENTANYL CITRATE 50 UG/ML
25-50 INJECTION, SOLUTION INTRAMUSCULAR; INTRAVENOUS
Status: CANCELLED | OUTPATIENT
Start: 2017-10-20

## 2017-10-20 RX ORDER — SODIUM CHLORIDE, SODIUM LACTATE, POTASSIUM CHLORIDE, CALCIUM CHLORIDE 600; 310; 30; 20 MG/100ML; MG/100ML; MG/100ML; MG/100ML
INJECTION, SOLUTION INTRAVENOUS CONTINUOUS
Status: DISCONTINUED | OUTPATIENT
Start: 2017-10-20 | End: 2017-10-20 | Stop reason: HOSPADM

## 2017-10-20 RX ORDER — LABETALOL HYDROCHLORIDE 5 MG/ML
10 INJECTION, SOLUTION INTRAVENOUS
Status: DISCONTINUED | OUTPATIENT
Start: 2017-10-20 | End: 2017-10-20 | Stop reason: HOSPADM

## 2017-10-20 RX ORDER — OXYCODONE AND ACETAMINOPHEN 10; 325 MG/1; MG/1
1-2 TABLET ORAL
Status: DISCONTINUED | OUTPATIENT
Start: 2017-10-20 | End: 2017-10-20 | Stop reason: ALTCHOICE

## 2017-10-20 RX ORDER — ONDANSETRON 4 MG/1
4 TABLET, ORALLY DISINTEGRATING ORAL EVERY 30 MIN PRN
Status: DISCONTINUED | OUTPATIENT
Start: 2017-10-20 | End: 2017-10-20 | Stop reason: HOSPADM

## 2017-10-20 RX ORDER — MEPERIDINE HYDROCHLORIDE 25 MG/ML
12.5 INJECTION INTRAMUSCULAR; INTRAVENOUS; SUBCUTANEOUS
Status: DISCONTINUED | OUTPATIENT
Start: 2017-10-20 | End: 2017-10-20 | Stop reason: HOSPADM

## 2017-10-20 RX ORDER — SODIUM CHLORIDE, SODIUM LACTATE, POTASSIUM CHLORIDE, CALCIUM CHLORIDE 600; 310; 30; 20 MG/100ML; MG/100ML; MG/100ML; MG/100ML
INJECTION, SOLUTION INTRAVENOUS CONTINUOUS PRN
Status: DISCONTINUED | OUTPATIENT
Start: 2017-10-20 | End: 2017-10-20

## 2017-10-20 RX ORDER — ONDANSETRON 2 MG/ML
4 INJECTION INTRAMUSCULAR; INTRAVENOUS EVERY 30 MIN PRN
Status: DISCONTINUED | OUTPATIENT
Start: 2017-10-20 | End: 2017-10-20 | Stop reason: HOSPADM

## 2017-10-20 RX ORDER — ACETAMINOPHEN 325 MG/1
650 TABLET ORAL
Status: DISCONTINUED | OUTPATIENT
Start: 2017-10-20 | End: 2017-10-20 | Stop reason: HOSPADM

## 2017-10-20 RX ORDER — DEXAMETHASONE SODIUM PHOSPHATE 4 MG/ML
INJECTION, SOLUTION INTRA-ARTICULAR; INTRALESIONAL; INTRAMUSCULAR; INTRAVENOUS; SOFT TISSUE PRN
Status: DISCONTINUED | OUTPATIENT
Start: 2017-10-20 | End: 2017-10-20

## 2017-10-20 RX ORDER — PROPOFOL 10 MG/ML
INJECTION, EMULSION INTRAVENOUS PRN
Status: DISCONTINUED | OUTPATIENT
Start: 2017-10-20 | End: 2017-10-20

## 2017-10-20 RX ORDER — OXYCODONE HYDROCHLORIDE 5 MG/1
5 TABLET ORAL EVERY 4 HOURS PRN
Qty: 18 TABLET | Refills: 0 | Status: ON HOLD | OUTPATIENT
Start: 2017-10-20 | End: 2017-11-03

## 2017-10-20 RX ORDER — NALOXONE HYDROCHLORIDE 0.4 MG/ML
.1-.4 INJECTION, SOLUTION INTRAMUSCULAR; INTRAVENOUS; SUBCUTANEOUS
Status: DISCONTINUED | OUTPATIENT
Start: 2017-10-20 | End: 2017-10-20 | Stop reason: HOSPADM

## 2017-10-20 RX ORDER — OXYCODONE HCL 5 MG/5 ML
5 SOLUTION, ORAL ORAL EVERY 4 HOURS PRN
Qty: 75 ML | Refills: 0 | Status: SHIPPED | OUTPATIENT
Start: 2017-10-20 | End: 2017-10-20

## 2017-10-20 RX ORDER — ALBUTEROL SULFATE 0.83 MG/ML
2.5 SOLUTION RESPIRATORY (INHALATION) EVERY 4 HOURS PRN
Status: DISCONTINUED | OUTPATIENT
Start: 2017-10-20 | End: 2017-10-20 | Stop reason: HOSPADM

## 2017-10-20 RX ORDER — HYDRALAZINE HYDROCHLORIDE 20 MG/ML
2.5-5 INJECTION INTRAMUSCULAR; INTRAVENOUS EVERY 10 MIN PRN
Status: DISCONTINUED | OUTPATIENT
Start: 2017-10-20 | End: 2017-10-20 | Stop reason: HOSPADM

## 2017-10-20 RX ORDER — FENTANYL CITRATE 50 UG/ML
INJECTION, SOLUTION INTRAMUSCULAR; INTRAVENOUS PRN
Status: DISCONTINUED | OUTPATIENT
Start: 2017-10-20 | End: 2017-10-20

## 2017-10-20 RX ORDER — ONDANSETRON 2 MG/ML
INJECTION INTRAMUSCULAR; INTRAVENOUS PRN
Status: DISCONTINUED | OUTPATIENT
Start: 2017-10-20 | End: 2017-10-20

## 2017-10-20 RX ORDER — OXYCODONE HCL 5 MG/5 ML
20 SOLUTION, ORAL ORAL ONCE
Status: COMPLETED | OUTPATIENT
Start: 2017-10-20 | End: 2017-10-20

## 2017-10-20 RX ORDER — FENTANYL CITRATE 50 UG/ML
25-50 INJECTION, SOLUTION INTRAMUSCULAR; INTRAVENOUS
Status: DISCONTINUED | OUTPATIENT
Start: 2017-10-20 | End: 2017-10-20 | Stop reason: HOSPADM

## 2017-10-20 RX ORDER — LIDOCAINE HYDROCHLORIDE 20 MG/ML
INJECTION, SOLUTION INFILTRATION; PERINEURAL PRN
Status: DISCONTINUED | OUTPATIENT
Start: 2017-10-20 | End: 2017-10-20

## 2017-10-20 RX ADMIN — ONDANSETRON 4 MG: 2 INJECTION INTRAMUSCULAR; INTRAVENOUS at 08:32

## 2017-10-20 RX ADMIN — FENTANYL CITRATE 100 MCG: 50 INJECTION, SOLUTION INTRAMUSCULAR; INTRAVENOUS at 07:28

## 2017-10-20 RX ADMIN — PROPOFOL 80 MG: 10 INJECTION, EMULSION INTRAVENOUS at 07:35

## 2017-10-20 RX ADMIN — PHENYLEPHRINE HYDROCHLORIDE 50 MCG: 10 INJECTION, SOLUTION INTRAMUSCULAR; INTRAVENOUS; SUBCUTANEOUS at 08:03

## 2017-10-20 RX ADMIN — SUGAMMADEX 100 MG: 100 INJECTION, SOLUTION INTRAVENOUS at 08:32

## 2017-10-20 RX ADMIN — FENTANYL CITRATE 25 MCG: 50 INJECTION, SOLUTION INTRAMUSCULAR; INTRAVENOUS at 09:11

## 2017-10-20 RX ADMIN — OXYCODONE HYDROCHLORIDE 20 MG: 5 SOLUTION ORAL at 10:29

## 2017-10-20 RX ADMIN — PHENYLEPHRINE HYDROCHLORIDE 100 MCG: 10 INJECTION, SOLUTION INTRAMUSCULAR; INTRAVENOUS; SUBCUTANEOUS at 07:45

## 2017-10-20 RX ADMIN — DEXAMETHASONE SODIUM PHOSPHATE 4 MG: 4 INJECTION, SOLUTION INTRA-ARTICULAR; INTRALESIONAL; INTRAMUSCULAR; INTRAVENOUS; SOFT TISSUE at 07:46

## 2017-10-20 RX ADMIN — FENTANYL CITRATE 25 MCG: 50 INJECTION, SOLUTION INTRAMUSCULAR; INTRAVENOUS at 09:25

## 2017-10-20 RX ADMIN — MIDAZOLAM HYDROCHLORIDE 2 MG: 1 INJECTION, SOLUTION INTRAMUSCULAR; INTRAVENOUS at 07:16

## 2017-10-20 RX ADMIN — ROCURONIUM BROMIDE 25 MG: 10 INJECTION INTRAVENOUS at 07:35

## 2017-10-20 RX ADMIN — SODIUM CHLORIDE, POTASSIUM CHLORIDE, SODIUM LACTATE AND CALCIUM CHLORIDE: 600; 310; 30; 20 INJECTION, SOLUTION INTRAVENOUS at 07:16

## 2017-10-20 RX ADMIN — LIDOCAINE HYDROCHLORIDE 80 MG: 20 INJECTION, SOLUTION INFILTRATION; PERINEURAL at 07:35

## 2017-10-20 ASSESSMENT — PAIN DESCRIPTION - DESCRIPTORS: DESCRIPTORS: ACHING

## 2017-10-20 ASSESSMENT — ENCOUNTER SYMPTOMS: DYSRHYTHMIAS: 1

## 2017-10-20 NOTE — ANESTHESIA PREPROCEDURE EVALUATION
Anesthesia Evaluation     .             ROS/MED HX    ENT/Pulmonary:       Neurologic:     (+)Multiple Sclerosis     Cardiovascular:     (+) ----. : . . . :. dysrhythmias . Previous cardiac testing Echodate:results:EF 70%date: results: date: results: date: results:          METS/Exercise Tolerance:     Hematologic:         Musculoskeletal:         GI/Hepatic:     (+) GERD Other GI/Hepatic s/p esophagectomy      Renal/Genitourinary:         Endo:         Psychiatric:         Infectious Disease:         Malignancy:         Other:                     Physical Exam  Normal systems: cardiovascular and pulmonary    Airway   Mallampati: II  TM distance: >3 FB  Neck ROM: full    Dental     Cardiovascular       Pulmonary                     Anesthesia Plan      History & Physical Review  History and physical reviewed and following examination; no interval change.    ASA Status:  3 .    NPO Status:  > 8 hours    Plan for General, RSI and ETT with Intravenous induction. Maintenance will be Balanced.      I have examined the patient and reviewed the chart.  Plan:  GA, ETT,  RSI with routine monitors    Byron Rodriguez MD      Postoperative Care  Postoperative pain management:  IV analgesics.      Consents  Anesthetic plan, risks, benefits and alternatives discussed with:  Patient..                          .

## 2017-10-20 NOTE — DISCHARGE INSTRUCTIONS
[facility]  Same-Day Surgery   Adult Discharge Orders & Instructions     For 24 hours after surgery    1. Get plenty of rest.  A responsible adult must stay with you for at least 24 hours after you leave the hospital.   2. Do not drive or use heavy equipment.  If you have weakness or tingling, don't drive or use heavy equipment until this feeling goes away.  3. Do not drink alcohol.  4. Avoid strenuous or risky activities.  Ask for help when climbing stairs.   5. You may feel lightheaded.  IF so, sit for a few minutes before standing.  Have someone help you get up.   6. If you have nausea (feel sick to your stomach): Drink only clear liquids such as apple juice, ginger ale, broth or 7-Up.  Rest may also help.  Be sure to drink enough fluids.  Move to a regular diet as you feel able.  7. You may have a slight fever. Call the doctor if your fever is over 100 F (37.7 C) (taken under the tongue) or lasts longer than 24 hours.  8. You may have a dry mouth, a sore throat, muscle aches or trouble sleeping.  These should go away after 24 hours.  9. Do not make important or legal decisions.   Call your doctor for any of the followin.  Signs of infection (fever, growing tenderness at the surgery site, a large amount of drainage or bleeding, severe pain, foul-smelling drainage, redness, swelling).    2. It has been over 8 to 10 hours since surgery and you are still not able to urinate (pass water).    3.  Headache for over 24 hours.    To contact a doctor, call Dr. Nalini Barreto @ [phone] (clinic) or [phone] (office)   or:        [phone] and ask for the resident on call for   Thoracic Surgery (answered 24 hours a day)      Emergency Department:    [facility]: [phone]       (TTY for hearing impaired: [phone])

## 2017-10-20 NOTE — IP AVS SNAPSHOT
MRN:[id number]                      After Visit Summary   10/20/2017    Minerva Blanco    MRN: [id number]           Thank you!     Thank you for choosing [facility] for your care. Our goal is always to provide you with excellent care. Hearing back from our patients is one way we can continue to improve our services. Please take a few minutes to complete the written survey that you may receive in the mail after you visit with us. Thank you!        Patient Information     Date Of Birth          1945        About your hospital stay     You were admitted on:  October 20, 2017 You last received care in the:  Post Anesthesia Care Unit [facility]    You were discharged on:  October 20, 2017       Who to Call     For medical emergencies, please call 911.  For non-urgent questions about your medical care, please call your primary care provider or clinic, [phone]  For questions related to your surgery, please call your surgery clinic        Attending Provider     Provider Nalini Crespo MD Thoracic Diseases       Primary Care Provider Office Phone # Fax #    Andrea Nino [phone]      After Care Instructions     Discharge Instructions       THORACIC SURGERY DISCHARGE INSTRUCTIONS    DIET: as prior to admission.      If your plans upon discharge include prolonged periods of sitting (i.e a lengthy car or plane ride), it is highly recommended to get up and walk at least once per hour to help prevent swelling and blood clots.     You may get incision wet 2 days after operation. Do not submerge, soak, or scrub incision or swim until seen in follow-up or after two weeks.  Do not submerge your gastrostomy tube site.     Activity as tolerated, no strenous activity until seen in follow-up, no lifting greater than 20 pounds for the next 2 weeks.    Stay hydrated. Take over the counter fiber (metamucil or benefiber) and stool softeners (Miralax, docusate or senna) if  "becoming constipated with narcotic use.      Call for fever greater than 101.5, chills, increased size of incision, red skin around incision, vision changes, muscle strength changes, sensation changes, shortness of breath, or other concerns.    No driving while taking narcotic pain medication.    Transition to ibuprofen or tylenol/acetaminophen for pain control. Do not take tylenol/acetaminophen and acetaminophen containing narcotic (e.g., percocet or vicodin) at the same time. If you have known ulcer problems, or kidney trouble (elevated creatinine) do not take the ibuprofen.    In emergencies, call 911    For other Questions or Concerns;   A.) During weekday working hours (Monday through Friday 8am to 4:30pm)   call [phone] and ask to speak to a clinical nurse specialist.     B.) At nights (after 4:30pm), on weekends, or if urgent call [phone] and   tell the  \"I would like to page job code [phone], the thoracic surgery   fellow on call, please.\"      1. Follow up with Andrea Nino in 1-2 weeks.  2. Follow up will be arranged by the Thoracic office, we will contact you to set up the appointment.  You may call [phone] if you wish to schedule before you are contacted.                  Further instructions from your care team       [facility]  Same-Day Surgery   Adult Discharge Orders & Instructions     For 24 hours after surgery    1. Get plenty of rest.  A responsible adult must stay with you for at least 24 hours after you leave the hospital.   2. Do not drive or use heavy equipment.  If you have weakness or tingling, don't drive or use heavy equipment until this feeling goes away.  3. Do not drink alcohol.  4. Avoid strenuous or risky activities.  Ask for help when climbing stairs.   5. You may feel lightheaded.  IF so, sit for a few minutes before standing.  Have someone help you get up.   6. If you have nausea (feel sick to your stomach): Drink " only clear liquids such as apple juice, ginger ale, broth or 7-Up.  Rest may also help.  Be sure to drink enough fluids.  Move to a regular diet as you feel able.  7. You may have a slight fever. Call the doctor if your fever is over 100 F (37.7 C) (taken under the tongue) or lasts longer than 24 hours.  8. You may have a dry mouth, a sore throat, muscle aches or trouble sleeping.  These should go away after 24 hours.  9. Do not make important or legal decisions.   Call your doctor for any of the followin.  Signs of infection (fever, growing tenderness at the surgery site, a large amount of drainage or bleeding, severe pain, foul-smelling drainage, redness, swelling).    2. It has been over 8 to 10 hours since surgery and you are still not able to urinate (pass water).    3.  Headache for over 24 hours.    To contact a doctor, call Dr. Nalini Barreto @ [phone] (clinic) or [phone] (office)   or:        [phone] and ask for the resident on call for   Thoracic Surgery (answered 24 hours a day)      Emergency Department:    [facility]: [phone]       (TTY for hearing impaired: [phone])              Pending Results     No orders found from 10/18/2017 to 10/21/2017.            Admission Information     Date & Time Provider Department Dept. Phone    10/20/2017 Nalini Barreto MD Post Anesthesia Care Unit [facility] [phone]      Your Vitals Were     Blood Pressure Pulse Temperature Respirations Height Weight    139/68 72 98.2  F (36.8  C) (Oral) 14 1.524 m (5') 42.9 kg (94 lb 9.2 oz)    Pulse Oximetry BMI (Body Mass Index)                100% 18.47 kg/m2          [employer] Information     [employer] gives you secure access to your electronic health record. If you see a primary care provider, you can also send messages to your care team and make appointments. If you have questions, please call your primary care clinic.  If you do not have a primary care provider, please call [phone]  and they will assist you.        Care EveryWhere ID     This is your Care EveryWhere ID. This could be used by other organizations to access your [facility] medical records  [id number]        Equal Access to Services     TOM JORGENSEN : Dora Meng, isis romero, shielata kamatt erikradha, waxlula jacoby cooperaamir masonanyielissa saldivar. So [facility] [phone].    ATENCIÓN: Si habla español, tiene a jackson disposición servicios gratuitos de asistencia lingüística. Llame al [phone].    We comply with applicable federal civil rights laws and [place] laws. We do not discriminate on the basis of race, color, national origin, age, disability, sex, sexual orientation, or gender identity.               Review of your medicines      CONTINUE these medicines which may have CHANGED, or have new prescriptions. If we are uncertain of the size of tablets/capsules you have at home, strength may be listed as something that might have changed.        Dose / Directions    acetaminophen 32 mg/mL solution   Commonly known as:  TYLENOL   Indication:  Pain   This may have changed:    - when to take this  - reasons to take this   Used for:  Esophageal perforation        Dose:  975 mg   30.45 mLs (975 mg) by Per Feeding Tube route 3 times daily   Quantity:  300 mL   Refills:  1       * diphenoxylate-atropine 2.5-0.025 MG/5ML liquid   Commonly known as:  LOMOTIL   This may have changed:  when to take this   Used for:  Bowel habit changes        Dose:  5 mL   Take 5 mLs by mouth 4 times daily as needed for diarrhea   Quantity:  300 mL   Refills:  0       * diphenoxylate-atropine 2.5-0.025 MG per tablet   Commonly known as:  LOMOTIL   This may have changed:  when to take this   Used for:  Esophageal anastomotic leak        Dose:  1 tablet   Take 1 tablet by mouth 4 times daily as needed for diarrhea   Quantity:  40 tablet   Refills:  1       loperamide 1 MG/5ML liquid   Commonly known as:  IMODIUM   This may have changed:   when to take this   Used for:  Bowel habit changes        Dose:  4 mg   Take 20 mLs (4 mg) by mouth 4 times daily as needed for diarrhea   Quantity:  200 mL   Refills:  0       pantoprazole 40 MG EC tablet   Commonly known as:  PROTONIX   This may have changed:    - how much to take  - how to take this  - when to take this  - additional instructions   Used for:  Gastroesophageal reflux disease, esophagitis presence not specified        Take by mouth 30-60 minutes before a meal.   Quantity:  30 tablet   Refills:  0       traZODone 100 MG tablet   Commonly known as:  DESYREL   This may have changed:  how much to take   Used for:  Insomnia, unspecified type        Dose:  50 mg   0.5 tablets (50 mg) by Per G Tube route At Bedtime   Quantity:  30 tablet   Refills:  0       * Notice:  This list has 2 medication(s) that are the same as other medications prescribed for you. Read the directions carefully, and ask your doctor or other care provider to review them with you.      CONTINUE these medicines which have NOT CHANGED        Dose / Directions    BENADRYL PO        Dose:  25 mg   Take 25 mg by mouth nightly as needed   Refills:  0       chlorhexidine 4 % liquid   Commonly known as:  HIBICLENS   Used for:  History of esophagectomy        Apply topically 2 times daily   Quantity:  200 mL   Refills:  0       cholestyramine 4 G Packet   Commonly known as:  QUESTRAN        Dose:  1 packet   Take 1 packet by mouth daily   Refills:  0       CULTURELLE PO        Dose:  1 tablet   Take 1 tablet by mouth 2 times daily   Refills:  0       ferrous sulfate 300 (60 FE) MG/5ML syrup   Used for:  Anemia due to other cause        Dose:  300 mg   5 mLs (300 mg) by Per J Tube route daily   Quantity:  150 mL   Refills:  0       fiber modular packet   Used for:  History of esophagectomy        Dose:  1 packet   1 packet by Per Feeding Tube route 3 times daily (with meals)   Quantity:  60 packet   Refills:  0       hydrOXYzine 25 MG capsule    Commonly known as:  VISTARIL        Dose:  25 mg   Take 25 mg by mouth as needed   Refills:  0       MELATONIN PO        Dose:  5 mg   Take 5 mg by mouth At Bedtime   Refills:  0       multivitamins with minerals Liqd liquid        Dose:  15 mL   Take 15 mLs by mouth daily   Refills:  0       ondansetron 4 MG ODT tab   Commonly known as:  ZOFRAN-ODT   Used for:  Esophageal anastomotic leak        Dose:  4 mg   Take 1 tablet (4 mg) by mouth every 6 hours as needed for nausea or vomiting   Quantity:  40 tablet   Refills:  1       * order for DME   Used for:  Hx of esophagectomy        Equipment being ordered:  [facility] Suction Machine-Intermittent Suction Canisters(2) Suction Canister Holders(2) Suction Connect Tube(2) 5 in 1 Connector(2) Bacteria Filter(2) [employer]auer Suction(2)  Treatment Diagnosis: Esophogeal Perforation, s/p esophagectomy   Quantity:  1 Device   Refills:  0       * order for DME   Used for:  Esophageal perforation        Equipment being ordered:  Suction Pump Suction Canister(2) Suction Tubing(2) Bacteria Filter(2) Yaunkauer(4) Red Rubber Catheter(2)  Treatment Diagnosis: Esophogeal Perforation, s/p esophagectomy   Quantity:  1 Device   Refills:  0       * oxyCODONE 5 MG/5ML solution   Commonly known as:  ROXICODONE   Used for:  Esophageal perforation        Dose:  5 mg   5 mLs (5 mg) by Per Feeding Tube route every 4 hours as needed for pain   Quantity:  75 mL   Refills:  0       * oxyCODONE 5 MG IR tablet   Commonly known as:  ROXICODONE   Used for:  Esophageal anastomotic leak        Dose:  5 mg   Take 1 tablet (5 mg) by mouth See Admin Instructions One tablet every 4-6 hours as needed for pain   Quantity:  84 tablet   Refills:  0       tiZANidine 4 MG capsule   Commonly known as:  ZANAFLEX        Dose:  4 mg   Take 4 mg by mouth 3 times daily   Refills:  0       TRAMADOL HCL PO        Dose:  25 mg   Take 25 mg by mouth every 6 hours as needed for moderate to severe pain   Refills:  0       UNABLE  TO FIND        2 nell supplement 4 cans daily per g tube   Refills:  0       * Notice:  This list has 4 medication(s) that are the same as other medications prescribed for you. Read the directions carefully, and ask your doctor or other care provider to review them with you.             Protect others around you: Learn how to safely use, store and throw away your medicines at www.disposemymeds.org.             Medication List: This is a list of all your medications and when to take them. Check marks below indicate your daily home schedule. Keep this list as a reference.      Medications           Morning Afternoon Evening Bedtime As Needed    acetaminophen 32 mg/mL solution   Commonly known as:  TYLENOL   30.45 mLs (975 mg) by Per Feeding Tube route 3 times daily                                BENADRYL PO   Take 25 mg by mouth nightly as needed                                chlorhexidine 4 % liquid   Commonly known as:  HIBICLENS   Apply topically 2 times daily                                cholestyramine 4 G Packet   Commonly known as:  QUESTRAN   Take 1 packet by mouth daily                                CULTURELLE PO   Take 1 tablet by mouth 2 times daily                                * diphenoxylate-atropine 2.5-0.025 MG/5ML liquid   Commonly known as:  LOMOTIL   Take 5 mLs by mouth 4 times daily as needed for diarrhea                                * diphenoxylate-atropine 2.5-0.025 MG per tablet   Commonly known as:  LOMOTIL   Take 1 tablet by mouth 4 times daily as needed for diarrhea                                ferrous sulfate 300 (60 FE) MG/5ML syrup   5 mLs (300 mg) by Per J Tube route daily                                fiber modular packet   1 packet by Per Feeding Tube route 3 times daily (with meals)                                hydrOXYzine 25 MG capsule   Commonly known as:  VISTARIL   Take 25 mg by mouth as needed                                loperamide 1 MG/5ML liquid   Commonly known as:   IMODIUM   Take 20 mLs (4 mg) by mouth 4 times daily as needed for diarrhea                                MELATONIN PO   Take 5 mg by mouth At Bedtime                                multivitamins with minerals Liqd liquid   Take 15 mLs by mouth daily                                ondansetron 4 MG ODT tab   Commonly known as:  ZOFRAN-ODT   Take 1 tablet (4 mg) by mouth every 6 hours as needed for nausea or vomiting                                * order for DME   Equipment being ordered:  [facility] Suction Machine-Intermittent Suction Canisters(2) Suction Canister Holders(2) Suction Connect Tube(2) 5 in 1 Connector(2) Bacteria Filter(2) Yaunkauer Suction(2)  Treatment Diagnosis: Esophogeal Perforation, s/p esophagectomy                                * order for DME   Equipment being ordered:  Suction Pump Suction Canister(2) Suction Tubing(2) Bacteria Filter(2) Yaunkauer(4) Red Rubber Catheter(2)  Treatment Diagnosis: Esophogeal Perforation, s/p esophagectomy                                * oxyCODONE 5 MG/5ML solution   Commonly known as:  ROXICODONE   5 mLs (5 mg) by Per Feeding Tube route every 4 hours as needed for pain                                * oxyCODONE 5 MG IR tablet   Commonly known as:  ROXICODONE   Take 1 tablet (5 mg) by mouth See Admin Instructions One tablet every 4-6 hours as needed for pain                                pantoprazole 40 MG EC tablet   Commonly known as:  PROTONIX   Take by mouth 30-60 minutes before a meal.                                tiZANidine 4 MG capsule   Commonly known as:  ZANAFLEX   Take 4 mg by mouth 3 times daily                                TRAMADOL HCL PO   Take 25 mg by mouth every 6 hours as needed for moderate to severe pain                                traZODone 100 MG tablet   Commonly known as:  DESYREL   0.5 tablets (50 mg) by Per G Tube route At Bedtime                                UNABLE TO FIND   2 nell supplement 4 cans daily per g tube                                 * Notice:  This list has 6 medication(s) that are the same as other medications prescribed for you. Read the directions carefully, and ask your doctor or other care provider to review them with you.

## 2017-10-20 NOTE — ANESTHESIA POSTPROCEDURE EVALUATION
Patient: Minerva Blanco    Procedure(s):  Upper Endoscopy with Stent Exchange, Cauterization Tracheosphageal Fistula Tract - Wound Class: II-Clean Contaminated    Diagnosis:Esophageal Perforation, Tracheal Esophageal Fistula    Diagnosis Additional Information: No value filed.    Anesthesia Type:  General, RSI, ETT    Note:  Anesthesia Post Evaluation    Patient location during evaluation: Phase 2  Patient participation: Able to fully participate in evaluation  Level of consciousness: awake and alert  Pain management: adequate  Airway patency: patent  Cardiovascular status: acceptable  Respiratory status: acceptable  Hydration status: acceptable  PONV: none             Last vitals:  Vitals:    10/20/17 0915 10/20/17 0930 10/20/17 0945   BP: 139/68 118/75 127/63   Pulse:      Resp: 14     Temp: (P) 36.9  C (98.4  F)  36.8  C (98.2  F)   SpO2: 100% 100%          Electronically Signed By: Byron Rodriguez MD  October 20, 2017  9:54 AM

## 2017-10-20 NOTE — OP NOTE
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       PROCEDURES:   1.  Endoscopy with removal of esophageal stent and cauterization of fistulous tract.   2.  Replacement of esophageal stent (19 x 120).   3.  Cauterization of chest wall wound.       SURGEON:  Nalini Barreto MD      RESIDENT SURGEON: Scot Bello MD.         ANESTHESIA:  General.       ESTIMATED BLOOD LOSS:  Zero.       FINDINGS:  There is still a fistulous tract that we cauterized in its entirety both from the endoscopic side as well as from the chest wound side.         DESCRIPTION OF PROCEDURE:  With Minerva Santoro in the supine position under general anesthesia, we performed an endoscopy and removed the previous esophageal stent with the rat-tooth forceps without difficulty.  Following this, we identified the tract and then cauterized it extensively endoscopically with the gold probe.  We also cauterized it extensively with the cautery on the skin.  We then placed a wire and under fluoroscopic guidance, replaced the stents. We used a 19 x 120mm stent to ensure adequate coverage of the fistula.       The patient tolerated procedure well.

## 2017-10-20 NOTE — OR NURSING
Discharge instructions reviewed with patient and spouse.  No questions or concerns at this time.  Oxycodone script sent with patient, unable to fill at [facility] pharmacy as patient had recently filled an Oxycodone script at an outside facility.  All belongings returned to patient.  Wheelchair transportation to the front lobby.

## 2017-10-20 NOTE — BRIEF OP NOTE
[facility], [place]    Brief Operative Note    Pre-operative diagnosis: Esophageal Perforation, Tracheal Esophageal Fistula    Post-operative diagnosis Same  Procedure: Procedure(s):  Upper Endoscopy with Stent Exchange, Cauterization Tracheosphageal Fistula Tract - Wound Class: II-Clean Contaminated  Surgeon: Surgeon(s) and Role:     * Nalini Barreto MD - Primary     * Scot Bello MD - Assisting  Anesthesia: General   Estimated blood loss: Minimal  Drains: None  Specimens: * No specimens in log *  Findings:   Small tracheocutaneous fistula, cauterized from inside and outside.  Complications: None.  Implants: None.

## 2017-10-20 NOTE — ANESTHESIA CARE TRANSFER NOTE
Patient: Minerva Blanco    Procedure(s):  Upper Endoscopy with Stent Exchange, Cauterization Tracheosphageal Fistula Tract - Wound Class: II-Clean Contaminated    Diagnosis: Esophageal Perforation, Tracheal Esophageal Fistula    Diagnosis Additional Information: No value filed.    Anesthesia Type:   General, RSI, ETT     Note:  Airway :Nasal Cannula  Patient transferred to:PACU  Handoff Report: Identifed the Patient, Identified the Reponsible Provider, Reviewed the pertinent medical history, Discussed the surgical course, Reviewed Intra-OP anesthesia mangement and issues during anesthesia, Set expectations for post-procedure period and Allowed opportunity for questions and acknowledgement of understanding      Vitals: (Last set prior to Anesthesia Care Transfer)    CRNA VITALS  10/20/2017 0811 - 10/20/2017 0845      10/20/2017             Pulse: 106    SpO2: 100 %    Resp Rate (observed): (!)  2                Electronically Signed By: Charles Patrick Schlatter, APRN CRNA  October 20, 2017  8:45 AM

## 2017-10-20 NOTE — IP AVS SNAPSHOT
Post Anesthesia Care Unit [address] [address]    Phone:  [phone]                                       After Visit Summary   10/20/2017    Minerva Blanco    MRN: [id number]           After Visit Summary Signature Page     I have received my discharge instructions, and my questions have been answered. I have discussed any challenges I see with this plan with the nurse or doctor.    ..........................................................................................................................................  Patient/Patient Representative Signature      ..........................................................................................................................................  Patient Representative Print Name and Relationship to Patient    ..................................................               ................................................  Date                                            Time    ..........................................................................................................................................  Reviewed by Signature/Title    ...................................................              ..............................................  Date                                                            Time

## 2017-10-24 ENCOUNTER — COMMUNICATION - HEALTHEAST (OUTPATIENT)
Dept: PALLIATIVE MEDICINE | Facility: OTHER | Age: 72
End: 2017-10-24

## 2017-10-24 DIAGNOSIS — G89.4 CHRONIC PAIN SYNDROME: ICD-10-CM

## 2017-10-24 DIAGNOSIS — K22.3 ESOPHAGEAL PERFORATION: Primary | ICD-10-CM

## 2017-10-26 ENCOUNTER — OFFICE VISIT (OUTPATIENT)
Dept: SURGERY | Facility: CLINIC | Age: 72
End: 2017-10-26
Attending: STUDENT IN AN ORGANIZED HEALTH CARE EDUCATION/TRAINING PROGRAM
Payer: MEDICARE

## 2017-10-26 VITALS
WEIGHT: 93.25 LBS | DIASTOLIC BLOOD PRESSURE: 68 MMHG | BODY MASS INDEX: 18.21 KG/M2 | RESPIRATION RATE: 16 BRPM | TEMPERATURE: 97.7 F | HEART RATE: 73 BPM | OXYGEN SATURATION: 97 % | SYSTOLIC BLOOD PRESSURE: 120 MMHG

## 2017-10-26 DIAGNOSIS — K91.89 ESOPHAGEAL ANASTOMOTIC LEAK: ICD-10-CM

## 2017-10-26 PROCEDURE — 99212 OFFICE O/P EST SF 10 MIN: CPT | Mod: ZF

## 2017-10-26 RX ORDER — DIPHENOXYLATE HCL/ATROPINE 2.5-.025MG
1 TABLET ORAL 3 TIMES DAILY PRN
Qty: 40 TABLET | Refills: 1 | Status: SHIPPED | OUTPATIENT
Start: 2017-10-26 | End: 2018-01-31 | Stop reason: ALTCHOICE

## 2017-10-26 ASSESSMENT — PAIN SCALES - GENERAL: PAINLEVEL: NO PAIN (0)

## 2017-10-26 NOTE — MR AVS SNAPSHOT
After Visit Summary   10/26/2017    Minerva Blanco    MRN: [id number]           Patient Information     Date Of Birth          1945        Visit Information        Provider Department      10/26/2017 4:30 PM Nalini Barreto MD [facility]        Today's Diagnoses     Esophageal anastomotic leak           Follow-ups after your visit        Your next 10 appointments already scheduled     Nov 03, 2017   Procedure with Nalini Barreto MD   [facility] [place], Same Day Surgery (--)    [address] [address]   [phone]            Nov 09, 2017  9:30 AM CST   (Arrive by 9:15 AM)   Return Visit with Nalini Barreto MD   [facility] ([facility])    [address] [place] [id number] [phone]            Nov 17, 2017   Procedure with Nalini Barreto MD   [facility] [place], Same Day Surgery (--)    [address] [address]   [phone]            Nov 30, 2017  9:00 AM CST   (Arrive by 8:45 AM)   Return Visit with Nalini Barreto MD   [facility] ([facility])    [address] [place] [id number] [phone]              Future tests that were ordered for you today     Open Future Orders        Priority Expected Expires Ordered    CT Chest w/o contrast Routine  11/2/2018 11/2/2017            Who to contact     If you have questions or need follow up information about today's clinic visit or your schedule please contact [facility] directly at [phone].  Normal or non-critical lab and imaging results will be communicated to you by MyChart, letter or phone within 4 business days after the clinic has received the results. If you do not hear from us within 7 days, please contact the clinic through MyChart or phone. If you have a critical or abnormal lab result, we will notify you by phone as soon as  possible.  Submit refill requests through [employer] or call your pharmacy and they will forward the refill request to us. Please allow 3 business days for your refill to be completed.          Additional Information About Your Visit        [employer] Information     [employer] gives you secure access to your electronic health record. If you see a primary care provider, you can also send messages to your care team and make appointments. If you have questions, please call your primary care clinic.  If you do not have a primary care provider, please call [phone] and they will assist you.        Care EveryWhere ID     This is your Care EveryWhere ID. This could be used by other organizations to access your [facility] medical records  [id number]        Your Vitals Were     Pulse Temperature Respirations Pulse Oximetry BMI (Body Mass Index)       73 97.7  F (36.5  C) (Oral) 16 97% 18.21 kg/m2        Blood Pressure from Last 3 Encounters:   10/26/17 120/68   10/20/17 115/64   10/12/17 115/72    Weight from Last 3 Encounters:   10/26/17 42.3 kg (93 lb 4.1 oz)   10/20/17 42.9 kg (94 lb 9.2 oz)   10/12/17 42.5 kg (93 lb 11.2 oz)              Today, you had the following     No orders found for display         Today's Medication Changes          These changes are accurate as of: 10/26/17 11:59 PM.  If you have any questions, ask your nurse or doctor.               These medicines have changed or have updated prescriptions.        Dose/Directions    * diphenoxylate-atropine 2.5-0.025 MG/5ML liquid   Commonly known as:  LOMOTIL   This may have changed:  when to take this   Used for:  Bowel habit changes   Changed by:  Jens Wise MD        Dose:  5 mL   Take 5 mLs by mouth 4 times daily as needed for diarrhea   Quantity:  300 mL   Refills:  0       * diphenoxylate-atropine 2.5-0.025 MG per tablet   Commonly known as:  LOMOTIL   This may have changed:    - when to take this  - reasons to take this   Used for:   Esophageal anastomotic leak   Changed by:  Nalini Barreto MD        Dose:  1 tablet   Take 1 tablet by mouth 3 times daily as needed   Quantity:  40 tablet   Refills:  1       loperamide 1 MG/5ML liquid   Commonly known as:  IMODIUM   This may have changed:  when to take this   Used for:  Bowel habit changes        Dose:  4 mg   Take 20 mLs (4 mg) by mouth 4 times daily as needed for diarrhea   Quantity:  200 mL   Refills:  0       pantoprazole 40 MG EC tablet   Commonly known as:  PROTONIX   This may have changed:    - how much to take  - how to take this  - when to take this  - additional instructions   Used for:  Gastroesophageal reflux disease, esophagitis presence not specified        Take by mouth 30-60 minutes before a meal.   Quantity:  30 tablet   Refills:  0       traZODone 100 MG tablet   Commonly known as:  DESYREL   This may have changed:  how much to take   Used for:  Insomnia, unspecified type        Dose:  50 mg   0.5 tablets (50 mg) by Per G Tube route At Bedtime   Quantity:  30 tablet   Refills:  0       * Notice:  This list has 2 medication(s) that are the same as other medications prescribed for you. Read the directions carefully, and ask your doctor or other care provider to review them with you.         Where to get your medicines      These medications were sent to [id number] - [place], [place] - [address] [place] [address]  [address], [place] [address]     Phone:  [phone]     ferrous sulfate 300 (60 FE) MG/5ML syrup         Some of these will need a paper prescription and others can be bought over the counter.  Ask your nurse if you have questions.     Bring a paper prescription for each of these medications     diphenoxylate-atropine 2.5-0.025 MG per tablet                Primary Care Provider Office Phone # Fax #    Andrea Nino [phone]       [facility] access hospital [address] [address]        Equal Access to Services     TOM JORGENSEN AH:  Hadii aad ku hadingeo Sostanislawali, waaxda luqadaha, qaybta kaalmada monica, florentin yennylori watt laelifaamir yariel. So [facility] [phone].    ATENCIÓN: Si jareth rojo, tiene a jackson disposición servicios gratuitos de asistencia lingüística. Llame al [phone].    We comply with applicable federal civil rights laws and [place] laws. We do not discriminate on the basis of race, color, national origin, age, disability, sex, sexual orientation, or gender identity.            Thank you!     Thank you for choosing [facility]  for your care. Our goal is always to provide you with excellent care. Hearing back from our patients is one way we can continue to improve our services. Please take a few minutes to complete the written survey that you may receive in the mail after your visit with us. Thank you!             Your Updated Medication List - Protect others around you: Learn how to safely use, store and throw away your medicines at www.disposemymeds.org.          This list is accurate as of: 10/26/17 11:59 PM.  Always use your most recent med list.                   Brand Name Dispense Instructions for use Diagnosis    acetaminophen 32 mg/mL solution    TYLENOL    300 mL    30.45 mLs (975 mg) by Per Feeding Tube route 3 times daily as needed    Esophageal perforation       BENADRYL PO      Take 25 mg by mouth nightly as needed        chlorhexidine 4 % liquid    HIBICLENS    200 mL    Apply topically 2 times daily    History of esophagectomy       cholestyramine 4 G Packet    QUESTRAN     Take 1 packet by mouth daily        CULTURELLE PO      Take 1 tablet by mouth 2 times daily        * diphenoxylate-atropine 2.5-0.025 MG/5ML liquid    LOMOTIL    300 mL    Take 5 mLs by mouth 4 times daily as needed for diarrhea    Bowel habit changes       * diphenoxylate-atropine 2.5-0.025 MG per tablet    LOMOTIL    40 tablet    Take 1 tablet by mouth 3 times daily as needed    Esophageal anastomotic leak        ferrous sulfate 300 (60 FE) MG/5ML syrup     150 mL    5 mLs (300 mg) by Per J Tube route daily    Esophageal anastomotic leak       fiber modular packet     60 packet    1 packet by Per Feeding Tube route 3 times daily (with meals)    History of esophagectomy       hydrOXYzine 25 MG capsule    VISTARIL     Take 25 mg by mouth as needed        loperamide 1 MG/5ML liquid    IMODIUM    200 mL    Take 20 mLs (4 mg) by mouth 4 times daily as needed for diarrhea    Bowel habit changes       MELATONIN PO      Take 5 mg by mouth At Bedtime        multivitamins with minerals Liqd liquid      Take 15 mLs by mouth daily        ondansetron 4 MG ODT tab    ZOFRAN-ODT    40 tablet    Take 1 tablet (4 mg) by mouth every 6 hours as needed for nausea or vomiting    Esophageal anastomotic leak       * order for DME     1 Device    Equipment being ordered:  [employer] Suction Machine-Intermittent Suction Canisters(2) Suction Canister Holders(2) Suction Connect Tube(2) 5 in 1 Connector(2) Bacteria Filter(2) Yaunkauer Suction(2)  Treatment Diagnosis: Esophogeal Perforation, s/p esophagectomy    Hx of esophagectomy       * order for DME     1 Device    Equipment being ordered:  Suction Pump Suction Canister(2) Suction Tubing(2) Bacteria Filter(2) Yaunkauer(4) Red Rubber Catheter(2)  Treatment Diagnosis: Esophogeal Perforation, s/p esophagectomy    Esophageal perforation       * oxyCODONE IR 5 MG tablet    ROXICODONE    84 tablet    Take 1 tablet (5 mg) by mouth See Admin Instructions One tablet every 4-6 hours as needed for pain    Esophageal anastomotic leak       * oxyCODONE IR 5 MG tablet    ROXICODONE    18 tablet    Take 1 tablet (5 mg) by mouth every 4 hours as needed for pain maximum 6 tablet(s) per day    Esophageal anastomotic leak       pantoprazole 40 MG EC tablet    PROTONIX    30 tablet    Take by mouth 30-60 minutes before a meal.    Gastroesophageal reflux disease, esophagitis presence not specified       tiZANidine 4 MG  capsule    ZANAFLEX     Take 4 mg by mouth 3 times daily        TRAMADOL HCL PO      Take 25 mg by mouth every 6 hours as needed for moderate to severe pain        traZODone 100 MG tablet    DESYREL    30 tablet    0.5 tablets (50 mg) by Per G Tube route At Bedtime    Insomnia, unspecified type       UNABLE TO FIND      2 nell supplement 4 cans daily per g tube        * Notice:  This list has 6 medication(s) that are the same as other medications prescribed for you. Read the directions carefully, and ask your doctor or other care provider to review them with you.

## 2017-10-26 NOTE — LETTER
10/26/2017       RE: Mienrva Blanco  [address]   [facility] [address]     Dear Colleague,    Thank you for referring your patient, Minerva Blanco, to the [facility]. Please see a copy of my visit note below.    THORACIC SURGERY FOLLOW UP VISIT      Dear Dr. Carroll,  I saw . Minerva Blanco in follow-up today. The clinical summary follows:      PREOP DIAGNOSIS   1.  Chronic esophageal perforation with mediastinal phlegmon.    2.  Status post fundoplication.        PROCEDURE   1/9/2017  1.  Esophagogastroscopy.    2.  Laparoscopic lysis of adhesions.    3.  Laparoscopic proximal gastrectomy and gastrostomy tube placement.    4.  Left thoracotomy with excision of mediastinal phlegmon and distal esophagectomy.    5.  Thoracic duct ligation.    6.  Right tube thoracostomy.    7.  Left esophageal spit fistula.    8.  Bronchoscopy.        6/9/2017  1. Esophagogastroscopy  2. Esophageal stent placement      7/17/2017  1. Esophagogastroduodenoscopy with stent exchange  2. Pharyngostomy tube revision     Multiple EGD, stent revision and cauterization of esophagocutaneous fistula (most recent: 10/20/2017)      PRIOR PROCEDURES  1) Multiple endoscopies and pharyngostomy tube placement x 2 (for drainage of paraesophageal cavity)  2) Esophageal stent placement, attempted placement of biliary stent into the paraesophageal cavity (7/22/2016)  3) Esophageal stent removal, placement of retrograde gastroesophageal tube (10/23/2016)  4) Toupet fundoplication (1/2016)          COMPLICATIONS  Thoracotomy wound infection requiring antibiotics      INTERVAL STUDIES  None      TOB never  ETOH negative      SUBJECTIVE        From a personal perspective, she is here with her  Enrique.          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 Minerva Blanco and Enrique, more than 50% of which were spent in counseling, coordination of care, and face-to-face time. I reviewed the plan as follows:  1) Weekly cauterization of wound and bi-weekly endoscopic cauterization of fistulous tract  2) CT scan of chest next week to evaluate for source of drainage from skin wound other than fistula  3) Scheduled for shoulder surgery next Friday    Again, thank you for allowing me to participate in the care of your patient.      Sincerely,    Nalini Barreto MD

## 2017-10-26 NOTE — NURSING NOTE
Oncology Rooming Note    October 26, 2017 4:38 PM   Minerva Blanco is a 72 year old female who presents for:    Chief Complaint   Patient presents with     Oncology Clinic Visit     Wound recheck     Initial Vitals: /68  Pulse 73  Temp 97.7  F (36.5  C) (Oral)  Resp 16  Wt 42.3 kg (93 lb 4.1 oz)  SpO2 97%  BMI 18.21 kg/m2 Estimated body mass index is 18.21 kg/(m^2) as calculated from the following:    Height as of 10/20/17: 1.524 m (5').    Weight as of this encounter: 42.3 kg (93 lb 4.1 oz). Body surface area is 1.34 meters squared.  No Pain (0) Comment: Data Unavailable   No LMP recorded. Patient is postmenopausal.  Allergies reviewed: Yes  Medications reviewed: Yes    Medications: Medication refills not needed today.  Pharmacy name entered into [employer]:    [id number] - [place], [place] - [address] [facility] [place], [place] - [address]    Clinical concerns: alexus Barreto was notified.    5 minutes for nursing intake (face to face time)     Jaky Martinez MA

## 2017-10-31 DIAGNOSIS — K91.89 ESOPHAGEAL ANASTOMOTIC LEAK: Primary | ICD-10-CM

## 2017-11-01 NOTE — ANESTHESIA POSTPROCEDURE EVALUATION
Patient: Minerva Blanco    Procedure(s):  Esophagogastroduodenostomy, Replacement of Esophageal Stent, Cauterization of Tracheosophageal Fistula anc chest wall,   C-arm - Wound Class: II-Clean Contaminated    Diagnosis:Esophageal Fistula   Diagnosis Additional Information: No value filed.    Anesthesia Type:  General, RSI    Note:  Anesthesia Post Evaluation    Patient location during evaluation: PACU  Patient participation: Able to fully participate in evaluation  Level of consciousness: awake and alert  Pain management: adequate  Airway patency: patent  Cardiovascular status: acceptable and hemodynamically stable  Respiratory status: acceptable  Hydration status: acceptable  PONV: none     Anesthetic complications: None          Last vitals:  Vitals:    10/02/17 1147 10/02/17 1200 10/02/17 1215   BP: 124/69 123/58    Resp: 16 16    Temp: 37.1  C (98.8  F)  37  C (98.6  F)   SpO2: 98% 100%          Electronically Signed By: Neri Ramirez MD  November 1, 2017  4:06 PM

## 2017-11-02 DIAGNOSIS — K22.3 ESOPHAGEAL PERFORATION: Primary | ICD-10-CM

## 2017-11-02 NOTE — PROGRESS NOTES
THORACIC SURGERY FOLLOW UP VISIT      Dear Dr. Carroll,  I saw . Minerva Blanco in follow-up today. The clinical summary follows:      PREOP DIAGNOSIS   1.  Chronic esophageal perforation with mediastinal phlegmon.    2.  Status post fundoplication.        PROCEDURE   1/9/2017  1.  Esophagogastroscopy.    2.  Laparoscopic lysis of adhesions.    3.  Laparoscopic proximal gastrectomy and gastrostomy tube placement.    4.  Left thoracotomy with excision of mediastinal phlegmon and distal esophagectomy.    5.  Thoracic duct ligation.    6.  Right tube thoracostomy.    7.  Left esophageal spit fistula.    8.  Bronchoscopy.        6/9/2017  1. Esophagogastroscopy  2. Esophageal stent placement      7/17/2017  1. Esophagogastroduodenoscopy with stent exchange  2. Pharyngostomy tube revision     Multiple EGD, stent revision and cauterization of esophagocutaneous fistula (most recent: 10/20/2017)      PRIOR PROCEDURES  1) Multiple endoscopies and pharyngostomy tube placement x 2 (for drainage of paraesophageal cavity)  2) Esophageal stent placement, attempted placement of biliary stent into the paraesophageal cavity (7/22/2016)  3) Esophageal stent removal, placement of retrograde gastroesophageal tube (10/23/2016)  4) Toupet fundoplication (1/2016)          COMPLICATIONS  Thoracotomy wound infection requiring antibiotics      INTERVAL STUDIES  None      TOB never  ETOH negative      SUBJECTIVE        From a personal perspective, she is here with her  Enrique.          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 Minerva Blanco and Enrique, more than 50% of which were spent in counseling, coordination of care, and face-to-face time. I reviewed the plan as follows:  1) Weekly  cauterization of wound and bi-weekly endoscopic cauterization of fistulous tract  2) CT scan of chest next week to evaluate for source of drainage from skin wound other than fistula  3) Scheduled for shoulder surgery next Friday

## 2017-11-02 NOTE — PROGRESS NOTES
This is a recent snapshot of the patient's [facility] medical record.  For current drug dose and complete information and questions, call [phone] or In [facility] pool, fv home infusion ([id number])  CSN Number:  [id number]

## 2017-11-03 ENCOUNTER — ANESTHESIA (OUTPATIENT)
Dept: SURGERY | Facility: CLINIC | Age: 72
End: 2017-11-03
Payer: MEDICARE

## 2017-11-03 ENCOUNTER — ANESTHESIA EVENT (OUTPATIENT)
Dept: SURGERY | Facility: CLINIC | Age: 72
End: 2017-11-03
Payer: MEDICARE

## 2017-11-03 ENCOUNTER — HOSPITAL ENCOUNTER (OUTPATIENT)
Facility: CLINIC | Age: 72
Discharge: HOME | End: 2017-11-03
Attending: STUDENT IN AN ORGANIZED HEALTH CARE EDUCATION/TRAINING PROGRAM | Admitting: STUDENT IN AN ORGANIZED HEALTH CARE EDUCATION/TRAINING PROGRAM
Payer: MEDICARE

## 2017-11-03 ENCOUNTER — APPOINTMENT (OUTPATIENT)
Dept: GENERAL RADIOLOGY | Facility: CLINIC | Age: 72
End: 2017-11-03
Attending: STUDENT IN AN ORGANIZED HEALTH CARE EDUCATION/TRAINING PROGRAM
Payer: MEDICARE

## 2017-11-03 ENCOUNTER — SURGERY (OUTPATIENT)
Age: 72
End: 2017-11-03

## 2017-11-03 VITALS
OXYGEN SATURATION: 98 % | DIASTOLIC BLOOD PRESSURE: 72 MMHG | WEIGHT: 93.7 LBS | HEIGHT: 60 IN | TEMPERATURE: 98.2 F | BODY MASS INDEX: 18.4 KG/M2 | SYSTOLIC BLOOD PRESSURE: 112 MMHG | RESPIRATION RATE: 16 BRPM

## 2017-11-03 LAB — GLUCOSE BLDC GLUCOMTR-MCNC: 83 MG/DL (ref 70–99)

## 2017-11-03 PROCEDURE — 25000125 ZZHC RX 250: Performed by: NURSE ANESTHETIST, CERTIFIED REGISTERED

## 2017-11-03 PROCEDURE — 36000053 ZZH SURGERY LEVEL 2 EA 15 ADDTL MIN - UMMC: Performed by: STUDENT IN AN ORGANIZED HEALTH CARE EDUCATION/TRAINING PROGRAM

## 2017-11-03 PROCEDURE — 71000027 ZZH RECOVERY PHASE 2 EACH 15 MINS: Performed by: STUDENT IN AN ORGANIZED HEALTH CARE EDUCATION/TRAINING PROGRAM

## 2017-11-03 PROCEDURE — 37000008 ZZH ANESTHESIA TECHNICAL FEE, 1ST 30 MIN: Performed by: STUDENT IN AN ORGANIZED HEALTH CARE EDUCATION/TRAINING PROGRAM

## 2017-11-03 PROCEDURE — C9399 UNCLASSIFIED DRUGS OR BIOLOG: HCPCS | Performed by: NURSE ANESTHETIST, CERTIFIED REGISTERED

## 2017-11-03 PROCEDURE — 37000009 ZZH ANESTHESIA TECHNICAL FEE, EACH ADDTL 15 MIN: Performed by: STUDENT IN AN ORGANIZED HEALTH CARE EDUCATION/TRAINING PROGRAM

## 2017-11-03 PROCEDURE — 25000128 H RX IP 250 OP 636: Performed by: NURSE ANESTHETIST, CERTIFIED REGISTERED

## 2017-11-03 PROCEDURE — 82962 GLUCOSE BLOOD TEST: CPT

## 2017-11-03 PROCEDURE — C1874 STENT, COATED/COV W/DEL SYS: HCPCS | Performed by: STUDENT IN AN ORGANIZED HEALTH CARE EDUCATION/TRAINING PROGRAM

## 2017-11-03 PROCEDURE — 71000015 ZZH RECOVERY PHASE 1 LEVEL 2 EA ADDTL HR: Performed by: STUDENT IN AN ORGANIZED HEALTH CARE EDUCATION/TRAINING PROGRAM

## 2017-11-03 PROCEDURE — 71000014 ZZH RECOVERY PHASE 1 LEVEL 2 FIRST HR: Performed by: STUDENT IN AN ORGANIZED HEALTH CARE EDUCATION/TRAINING PROGRAM

## 2017-11-03 PROCEDURE — 36000055 ZZH SURGERY LEVEL 2 W FLUORO 1ST 30 MIN - UMMC: Performed by: STUDENT IN AN ORGANIZED HEALTH CARE EDUCATION/TRAINING PROGRAM

## 2017-11-03 PROCEDURE — 25000128 H RX IP 250 OP 636: Performed by: ANESTHESIOLOGY

## 2017-11-03 PROCEDURE — 40000141 ZZH STATISTIC PERIPHERAL IV START W/O US GUIDANCE

## 2017-11-03 PROCEDURE — C1769 GUIDE WIRE: HCPCS | Performed by: STUDENT IN AN ORGANIZED HEALTH CARE EDUCATION/TRAINING PROGRAM

## 2017-11-03 PROCEDURE — 25000566 ZZH SEVOFLURANE, EA 15 MIN: Performed by: STUDENT IN AN ORGANIZED HEALTH CARE EDUCATION/TRAINING PROGRAM

## 2017-11-03 PROCEDURE — 27210794 ZZH OR GENERAL SUPPLY STERILE: Performed by: STUDENT IN AN ORGANIZED HEALTH CARE EDUCATION/TRAINING PROGRAM

## 2017-11-03 PROCEDURE — 40000170 ZZH STATISTIC PRE-PROCEDURE ASSESSMENT II: Performed by: STUDENT IN AN ORGANIZED HEALTH CARE EDUCATION/TRAINING PROGRAM

## 2017-11-03 PROCEDURE — 40000277 XR SURGERY CARM FLUORO LESS THAN 5 MIN W STILLS: Mod: TC

## 2017-11-03 DEVICE — STENT ESOPHAGEAL ENDOMAXX 23X100MM MAXX-2310
Type: IMPLANTABLE DEVICE | Site: ESOPHAGUS | Status: NON-FUNCTIONAL
Removed: 2017-11-17

## 2017-11-03 RX ORDER — SODIUM CHLORIDE, SODIUM LACTATE, POTASSIUM CHLORIDE, CALCIUM CHLORIDE 600; 310; 30; 20 MG/100ML; MG/100ML; MG/100ML; MG/100ML
INJECTION, SOLUTION INTRAVENOUS CONTINUOUS PRN
Status: DISCONTINUED | OUTPATIENT
Start: 2017-11-03 | End: 2017-11-03

## 2017-11-03 RX ORDER — SODIUM CHLORIDE, SODIUM LACTATE, POTASSIUM CHLORIDE, CALCIUM CHLORIDE 600; 310; 30; 20 MG/100ML; MG/100ML; MG/100ML; MG/100ML
INJECTION, SOLUTION INTRAVENOUS CONTINUOUS
Status: DISCONTINUED | OUTPATIENT
Start: 2017-11-03 | End: 2017-11-03 | Stop reason: HOSPADM

## 2017-11-03 RX ORDER — ONDANSETRON 2 MG/ML
INJECTION INTRAMUSCULAR; INTRAVENOUS PRN
Status: DISCONTINUED | OUTPATIENT
Start: 2017-11-03 | End: 2017-11-03

## 2017-11-03 RX ORDER — FENTANYL CITRATE 50 UG/ML
INJECTION, SOLUTION INTRAMUSCULAR; INTRAVENOUS PRN
Status: DISCONTINUED | OUTPATIENT
Start: 2017-11-03 | End: 2017-11-03

## 2017-11-03 RX ORDER — LIDOCAINE HYDROCHLORIDE 20 MG/ML
INJECTION, SOLUTION INFILTRATION; PERINEURAL PRN
Status: DISCONTINUED | OUTPATIENT
Start: 2017-11-03 | End: 2017-11-03

## 2017-11-03 RX ORDER — DEXAMETHASONE SODIUM PHOSPHATE 4 MG/ML
INJECTION, SOLUTION INTRA-ARTICULAR; INTRALESIONAL; INTRAMUSCULAR; INTRAVENOUS; SOFT TISSUE PRN
Status: DISCONTINUED | OUTPATIENT
Start: 2017-11-03 | End: 2017-11-03

## 2017-11-03 RX ORDER — GLYCOPYRROLATE 0.2 MG/ML
INJECTION, SOLUTION INTRAMUSCULAR; INTRAVENOUS PRN
Status: DISCONTINUED | OUTPATIENT
Start: 2017-11-03 | End: 2017-11-03

## 2017-11-03 RX ORDER — PROPOFOL 10 MG/ML
INJECTION, EMULSION INTRAVENOUS PRN
Status: DISCONTINUED | OUTPATIENT
Start: 2017-11-03 | End: 2017-11-03

## 2017-11-03 RX ORDER — FENTANYL CITRATE 50 UG/ML
25-50 INJECTION, SOLUTION INTRAMUSCULAR; INTRAVENOUS EVERY 5 MIN PRN
Status: DISCONTINUED | OUTPATIENT
Start: 2017-11-03 | End: 2017-11-03 | Stop reason: HOSPADM

## 2017-11-03 RX ORDER — HYDROMORPHONE HCL/0.9% NACL/PF 0.2MG/0.2
0.2 SYRINGE (ML) INTRAVENOUS EVERY 10 MIN PRN
Status: DISCONTINUED | OUTPATIENT
Start: 2017-11-03 | End: 2017-11-03 | Stop reason: HOSPADM

## 2017-11-03 RX ORDER — ONDANSETRON 2 MG/ML
4 INJECTION INTRAMUSCULAR; INTRAVENOUS EVERY 30 MIN PRN
Status: DISCONTINUED | OUTPATIENT
Start: 2017-11-03 | End: 2017-11-03 | Stop reason: HOSPADM

## 2017-11-03 RX ORDER — ONDANSETRON 4 MG/1
4 TABLET, ORALLY DISINTEGRATING ORAL EVERY 30 MIN PRN
Status: DISCONTINUED | OUTPATIENT
Start: 2017-11-03 | End: 2017-11-03 | Stop reason: HOSPADM

## 2017-11-03 RX ORDER — FENTANYL CITRATE 50 UG/ML
25-50 INJECTION, SOLUTION INTRAMUSCULAR; INTRAVENOUS
Status: DISCONTINUED | OUTPATIENT
Start: 2017-11-03 | End: 2017-11-03 | Stop reason: HOSPADM

## 2017-11-03 RX ADMIN — FENTANYL CITRATE 25 MCG: 50 INJECTION, SOLUTION INTRAMUSCULAR; INTRAVENOUS at 12:56

## 2017-11-03 RX ADMIN — MIDAZOLAM HYDROCHLORIDE 2 MG: 1 INJECTION, SOLUTION INTRAMUSCULAR; INTRAVENOUS at 11:05

## 2017-11-03 RX ADMIN — HYDROMORPHONE HYDROCHLORIDE 0.5 MG: 1 INJECTION, SOLUTION INTRAMUSCULAR; INTRAVENOUS; SUBCUTANEOUS at 12:06

## 2017-11-03 RX ADMIN — SUGAMMADEX 100 MG: 100 INJECTION, SOLUTION INTRAVENOUS at 12:11

## 2017-11-03 RX ADMIN — ROCURONIUM BROMIDE 20 MG: 10 INJECTION INTRAVENOUS at 11:52

## 2017-11-03 RX ADMIN — Medication 100 MG: at 11:18

## 2017-11-03 RX ADMIN — SODIUM CHLORIDE, POTASSIUM CHLORIDE, SODIUM LACTATE AND CALCIUM CHLORIDE: 600; 310; 30; 20 INJECTION, SOLUTION INTRAVENOUS at 11:04

## 2017-11-03 RX ADMIN — PROPOFOL 100 MG: 10 INJECTION, EMULSION INTRAVENOUS at 11:16

## 2017-11-03 RX ADMIN — Medication 0.2 MG: at 11:27

## 2017-11-03 RX ADMIN — LIDOCAINE HYDROCHLORIDE 100 MG: 20 INJECTION, SOLUTION INFILTRATION; PERINEURAL at 11:16

## 2017-11-03 RX ADMIN — FENTANYL CITRATE 50 MCG: 50 INJECTION, SOLUTION INTRAMUSCULAR; INTRAVENOUS at 12:49

## 2017-11-03 RX ADMIN — FENTANYL CITRATE 100 MCG: 50 INJECTION, SOLUTION INTRAMUSCULAR; INTRAVENOUS at 11:05

## 2017-11-03 RX ADMIN — HYDROMORPHONE HYDROCHLORIDE 0.2 MG: 1 INJECTION, SOLUTION INTRAMUSCULAR; INTRAVENOUS; SUBCUTANEOUS at 13:00

## 2017-11-03 RX ADMIN — ONDANSETRON 4 MG: 2 INJECTION INTRAMUSCULAR; INTRAVENOUS at 11:26

## 2017-11-03 RX ADMIN — PHENYLEPHRINE HYDROCHLORIDE 100 MCG: 10 INJECTION, SOLUTION INTRAMUSCULAR; INTRAVENOUS; SUBCUTANEOUS at 11:25

## 2017-11-03 RX ADMIN — DEXAMETHASONE SODIUM PHOSPHATE 4 MG: 4 INJECTION, SOLUTION INTRA-ARTICULAR; INTRALESIONAL; INTRAMUSCULAR; INTRAVENOUS; SOFT TISSUE at 11:26

## 2017-11-03 RX ADMIN — FENTANYL CITRATE 25 MCG: 50 INJECTION, SOLUTION INTRAMUSCULAR; INTRAVENOUS at 12:39

## 2017-11-03 RX ADMIN — HYDROMORPHONE HYDROCHLORIDE 0.2 MG: 1 INJECTION, SOLUTION INTRAMUSCULAR; INTRAVENOUS; SUBCUTANEOUS at 13:12

## 2017-11-03 NOTE — OP NOTE
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       PROCEDURES:   1.  Endoscopy with removal of esophageal stent and cauterization of fistulous tract.   2.  Replacement of esophageal stent (23 x 100).   3.  Cauterization of chest wall wound.       SURGEON:  Nalini Barreto MD          ANESTHESIA:  General.       ESTIMATED BLOOD LOSS:  Zero.       FINDINGS:  There is still a fistulous tract that we cauterized in its entirety both from the endoscopic side as well as from the chest wound side.         DESCRIPTION OF PROCEDURE:  With Minerva Santoro in the supine position under general anesthesia, we performed an endoscopy and removed the previous esophageal stent with the rat-tooth forceps without difficulty.  Following this, we identified the tract and then cauterized it extensively endoscopically with the gold probe.  We also cauterized it extensively with the cautery on the skin.  We then placed a wire and under fluoroscopic guidance, replaced the stents. We used a [id number] mm stent to ensure adequate coverage of the fistula.

## 2017-11-03 NOTE — BRIEF OP NOTE
[facility], [place]    Brief Operative Note    Pre-operative diagnosis: Esophageal Perforation   Post-operative diagnosis * No post-op diagnosis entered *  Procedure: Procedure(s):  Esophagogastroduodenoscopy, Stent Revision, Cauterization Of Traceoesophageal Fistula and stent exchange - Wound Class: II-Clean Contaminated  Surgeon: Surgeon(s) and Role:     * Nalini Barreto MD - Primary  Anesthesia: General   Estimated blood loss: Minimal  Drains: None  Specimens: * No specimens in log *  Findings:   Persistent fistulous tract  Complications: None.  Implants: None.

## 2017-11-03 NOTE — DISCHARGE INSTRUCTIONS
[facility]  Same-Day Surgery   Adult Discharge Orders & Instructions     For 24 hours after surgery    1. Get plenty of rest.  A responsible adult must stay with you for at least 24 hours after you leave the hospital.   2. Do not drive or use heavy equipment.  If you have weakness or tingling, don't drive or use heavy equipment until this feeling goes away.  3. Do not drink alcohol.  4. Avoid strenuous or risky activities.  Ask for help when climbing stairs.   5. You may feel lightheaded.  IF so, sit for a few minutes before standing.  Have someone help you get up.   6. If you have nausea (feel sick to your stomach): Drink only clear liquids such as apple juice, ginger ale, broth or 7-Up.  Rest may also help.  Be sure to drink enough fluids.  Move to a regular diet as you feel able.  7. You may have a slight fever. Call the doctor if your fever is over 100 F (37.7 C) (taken under the tongue) or lasts longer than 24 hours.  8. You may have a dry mouth, a sore throat, muscle aches or trouble sleeping.  These should go away after 24 hours.  9. Do not make important or legal decisions.   Call your doctor for any of the followin.  Signs of infection (fever, growing tenderness at the surgery site, a large amount of drainage or bleeding, severe pain, foul-smelling drainage, redness, swelling).    2. It has been over 8 to 10 hours since surgery and you are still not able to urinate (pass water).    3.  Headache for over 24 hours.      To contact a doctor, call Dr. aNlini Barreto @ [phone] (clinic) or [phone] (office) or:    x   [phone] and ask for the resident on call for   Thoracic Medicine (answered 24 hours a day)  x   Emergency Department:    [facility]: [phone]       (TTY for hearing impaired: [phone])

## 2017-11-03 NOTE — ANESTHESIA POSTPROCEDURE EVALUATION
Patient: Minerva Blanco    Procedure(s):  Esophagogastroduodenoscopy, Stent Revision, Cauterization Of Traceoesophageal Fistula and stent exchange - Wound Class: II-Clean Contaminated    Diagnosis:Esophageal Perforation   Diagnosis Additional Information: No value filed.    Anesthesia Type:  General    Note:  Anesthesia Post Evaluation    Patient location during evaluation: PACU  Patient participation: Able to fully participate in evaluation  Level of consciousness: awake and alert  Pain management: adequate  Airway patency: patent  Cardiovascular status: blood pressure returned to baseline  Respiratory status: acceptable  Hydration status: euvolemic  PONV: none             Last vitals:  Vitals:    11/03/17 1245 11/03/17 1300 11/03/17 1315   BP: 127/76 129/69 121/77   Resp: 16 16 16   Temp: 36.6  C (97.9  F) 36.7  C (98.1  F)    SpO2: 99% 98% 96%         Electronically Signed By: Carmelo Teixeira MD  November 3, 2017  1:52 PM

## 2017-11-03 NOTE — OR NURSING
Dr. Barreto was called in the OR, and RX for Oxycodone was requested. Dr. Teixeira was paged for a sign-out.

## 2017-11-03 NOTE — OR NURSING
Dr. Teixeira came to bedside to see pt for a sign-out. Dr. Barreto wants me to page Dr. Cabrera for a sign-out.

## 2017-11-03 NOTE — ANESTHESIA PREPROCEDURE EVALUATION
Anesthesia Evaluation     .             ROS/MED HX    ENT/Pulmonary:       Neurologic:       Cardiovascular: Comment: Afib after cardiac surgery, now resolved        METS/Exercise Tolerance:     Hematologic:  - neg hematologic  ROS       Musculoskeletal:         GI/Hepatic: Comment: Esophageal perforation s/p esophagectomy with esophageal stent    (+) GERD       Renal/Genitourinary:  - ROS Renal section negative       Endo:  - neg endo ROS       Psychiatric:         Infectious Disease:         Malignancy:         Other:                     Physical Exam  Normal systems: pulmonary and dental    Airway   Mallampati: I  TM distance: >3 FB  Neck ROM: full    Dental     Cardiovascular   Rhythm and rate: regular and normal      Pulmonary                     Anesthesia Plan      History & Physical Review  History and physical reviewed and following examination; no interval change.    ASA Status:  3 .        Plan for General with Intravenous induction. Maintenance will be Inhalation.    PONV prophylaxis:  Ondansetron (or other 5HT-3)       Postoperative Care  Postoperative pain management:  Multi-modal analgesia.      Consents  Anesthetic plan, risks, benefits and alternatives discussed with:  Patient..                          .

## 2017-11-03 NOTE — ANESTHESIA CARE TRANSFER NOTE
Patient: Minerva Blanco    Procedure(s):  Esophagogastroduodenoscopy, Stent Revision, Cauterization Of Traceoesophageal Fistula and stent exchange - Wound Class: II-Clean Contaminated    Diagnosis: Esophageal Perforation   Diagnosis Additional Information: No value filed.    Anesthesia Type:   General     Note:    Patient transferred to:PACU  Comments: Anesthesia Care Transfer Note    Patient: Minerva Blanco    Transferred to: PACU with supplemental O2    Patient vital signs: stable    Airway: none    Monitors on, VSS, breathing spontaneously, report to ROMAINE Crum CRNA   11/3/2017 12:24 PMHandoff Report: Identifed the Patient, Identified the Reponsible Provider, Reviewed the pertinent medical history, Discussed the surgical course, Reviewed Intra-OP anesthesia mangement and issues during anesthesia, Set expectations for post-procedure period and Allowed opportunity for questions and acknowledgement of understanding      Vitals: (Last set prior to Anesthesia Care Transfer)    CRNA VITALS  11/3/2017 1151 - 11/3/2017 1224      11/3/2017             Pulse: 105    Ht Rate: 103    SpO2: 100 %    Resp Rate (observed): 10                Electronically Signed By: CLIVE Giles CRNA  November 3, 2017  12:24 PM

## 2017-11-03 NOTE — IP AVS SNAPSHOT
MRN:[id number]                      After Visit Summary   11/3/2017    Minerva Blanco    MRN: [id number]           Thank you!     Thank you for choosing [facility] for your care. Our goal is always to provide you with excellent care. Hearing back from our patients is one way we can continue to improve our services. Please take a few minutes to complete the written survey that you may receive in the mail after you visit with us. Thank you!        Patient Information     Date Of Birth          1945        About your hospital stay     You were admitted on:  November 3, 2017 You last received care in the:  Post Anesthesia Care Unit [facility]    You were discharged on:  November 3, 2017       Who to Call     For medical emergencies, please call 911.  For non-urgent questions about your medical care, please call your primary care provider or clinic, [phone]  For questions related to your surgery, please call your surgery clinic        Attending Provider     Provider Specialty    Nalini Barreto MD Thoracic Diseases       Primary Care Provider Office Phone # Fax #    Andrea Nino [phone]      Your next 10 appointments already scheduled     Nov 09, 2017  9:30 AM CST   (Arrive by 9:15 AM)   Return Visit with Nalini Barreto MD   [facility] ([facility])    [address] [address] [phone]            Nov 17, 2017   Procedure with Nalini Barreto MD   [facility], [place], Same Day Surgery (--)    [address] [facility] [address] [phone]            Nov 30, 2017  9:00 AM CST   (Arrive by 8:45 AM)   Return Visit with Nalini Barreto MD   [facility] ([facility])    [address] [id number] [phone]              Further instructions from your care team       [facility],  [place]  Same-Day Surgery   Adult Discharge Orders & Instructions     For 24 hours after surgery    1. Get plenty of rest.  A responsible adult must stay with you for at least 24 hours after you leave the hospital.   2. Do not drive or use heavy equipment.  If you have weakness or tingling, don't drive or use heavy equipment until this feeling goes away.  3. Do not drink alcohol.  4. Avoid strenuous or risky activities.  Ask for help when climbing stairs.   5. You may feel lightheaded.  IF so, sit for a few minutes before standing.  Have someone help you get up.   6. If you have nausea (feel sick to your stomach): Drink only clear liquids such as apple juice, ginger ale, broth or 7-Up.  Rest may also help.  Be sure to drink enough fluids.  Move to a regular diet as you feel able.  7. You may have a slight fever. Call the doctor if your fever is over 100 F (37.7 C) (taken under the tongue) or lasts longer than 24 hours.  8. You may have a dry mouth, a sore throat, muscle aches or trouble sleeping.  These should go away after 24 hours.  9. Do not make important or legal decisions.   Call your doctor for any of the followin.  Signs of infection (fever, growing tenderness at the surgery site, a large amount of drainage or bleeding, severe pain, foul-smelling drainage, redness, swelling).    2. It has been over 8 to 10 hours since surgery and you are still not able to urinate (pass water).    3.  Headache for over 24 hours.      To contact a doctor, call Dr. Nalini Barreto @ [phone] (clinic) or [phone] (office) or:    x   [phone] and ask for the resident on call for   Thoracic Medicine (answered 24 hours a day)  x   Emergency Department:    [place]: [phone]       (TTY for hearing impaired: [phone])              Pending Results     Date and Time Order Name Status Description    11/3/2017 1124 XR Surgery MANPREET Fluoro L/T 5 Min w Stills In process             Admission Information      Date & Time Provider Department Dept. Phone    11/3/2017 Nalini Barreto MD Post Anesthesia Care Unit [facility] [phone]      Your Vitals Were     Blood Pressure Temperature Respirations Height Weight Pulse Oximetry    111/69 99.1  F (37.3  C) 16 1.524 m (5') 42.5 kg (93 lb 11.1 oz) 96%    BMI (Body Mass Index)                   18.3 kg/m2           [employer] Information     [facility] gives you secure access to your electronic health record. If you see a primary care provider, you can also send messages to your care team and make appointments. If you have questions, please call your primary care clinic.  If you do not have a primary care provider, please call [phone] and they will assist you.        Care EveryWhere ID     This is your Care EveryWhere ID. This could be used by other organizations to access your [facility] medical records  [id number]        Equal Access to Services     TOM JORGENSEN : Dora Meng, wagillian romero, qainata kaalmada monica, florentin saldivar. So [facility] [phone].    ATENCIÓN: Si habla español, tiene a jackson disposición servicios gratuitos de asistencia lingüística. Llame al [phone].    We comply with applicable federal civil rights laws and [place] laws. We do not discriminate on the basis of race, color, national origin, age, disability, sex, sexual orientation, or gender identity.               Review of your medicines      UNREVIEWED medicines. Ask your doctor about these medicines        Dose / Directions    acetaminophen 32 mg/mL solution   Commonly known as:  TYLENOL   Indication:  Pain   Used for:  Esophageal perforation        Dose:  975 mg   30.45 mLs (975 mg) by Per Feeding Tube route 3 times daily as needed   Quantity:  300 mL   Refills:  3       BENADRYL PO        Dose:  25 mg   Take 25 mg by mouth nightly as needed   Refills:  0       cholestyramine 4 G Packet   Commonly known as:  QUESTRAN        Dose:  1 packet    Take 1 packet by mouth daily   Refills:  0       CULTURELLE PO        Dose:  1 tablet   Take 1 tablet by mouth 2 times daily   Refills:  0       diphenoxylate-atropine 2.5-0.025 MG per tablet   Commonly known as:  LOMOTIL   Used for:  Esophageal anastomotic leak        Dose:  1 tablet   Take 1 tablet by mouth 3 times daily as needed   Quantity:  40 tablet   Refills:  1       ferrous sulfate 300 (60 FE) MG/5ML syrup   Used for:  Esophageal anastomotic leak        Dose:  300 mg   5 mLs (300 mg) by Per J Tube route daily   Quantity:  150 mL   Refills:  3       fiber modular packet   Used for:  History of esophagectomy        Dose:  1 packet   1 packet by Per Feeding Tube route 3 times daily (with meals)   Quantity:  60 packet   Refills:  0       hydrOXYzine 25 MG capsule   Commonly known as:  VISTARIL        Dose:  25 mg   Take 25 mg by mouth as needed   Refills:  0       loperamide 1 MG/5ML liquid   Commonly known as:  IMODIUM   Used for:  Bowel habit changes        Dose:  4 mg   Take 20 mLs (4 mg) by mouth 4 times daily as needed for diarrhea   Quantity:  200 mL   Refills:  0       MELATONIN PO        Dose:  5 mg   Take 5 mg by mouth At Bedtime   Refills:  0       multivitamins with minerals Liqd liquid        Dose:  15 mL   Take 15 mLs by mouth daily   Refills:  0       ondansetron 4 MG ODT tab   Commonly known as:  ZOFRAN-ODT   Used for:  Esophageal anastomotic leak        Dose:  4 mg   Take 1 tablet (4 mg) by mouth every 6 hours as needed for nausea or vomiting   Quantity:  40 tablet   Refills:  1       oxyCODONE IR 5 MG tablet   Commonly known as:  ROXICODONE   Used for:  Esophageal anastomotic leak        Dose:  5 mg   Take 1 tablet (5 mg) by mouth See Admin Instructions One tablet every 4-6 hours as needed for pain   Quantity:  84 tablet   Refills:  0       pantoprazole 40 MG EC tablet   Commonly known as:  PROTONIX   Used for:  Gastroesophageal reflux disease, esophagitis presence not specified        Take  by mouth 30-60 minutes before a meal.   Quantity:  30 tablet   Refills:  0       tiZANidine 4 MG capsule   Commonly known as:  ZANAFLEX        Dose:  4 mg   Take 4 mg by mouth 3 times daily   Refills:  0       TRAMADOL HCL PO        Dose:  25 mg   Take 25 mg by mouth every 6 hours as needed for moderate to severe pain   Refills:  0       traZODone 100 MG tablet   Commonly known as:  DESYREL   Used for:  Insomnia, unspecified type        Dose:  50 mg   0.5 tablets (50 mg) by Per G Tube route At Bedtime   Quantity:  30 tablet   Refills:  0       UNABLE TO FIND        2 nell supplement 4 cans daily per g tube   Refills:  0         CONTINUE these medicines which have NOT CHANGED        Dose / Directions    * order for DME   Used for:  Hx of esophagectomy        Equipment being ordered:  [employer] [facility] [place] [place] Suction Machine-Intermittent Suction Canisters(2) Suction Canister Holders(2) Suction Connect Tube(2) 5 in 1 Connector(2) Bacteria Filter(2) Yaunkauer Suction(2)  Treatment Diagnosis: Esophogeal Perforation, s/p esophagectomy   Quantity:  1 Device   Refills:  0       * order for DME   Used for:  Esophageal perforation        Equipment being ordered:  Suction Pump Suction Canister(2) Suction Tubing(2) Bacteria Filter(2) Yaunkauer(4) Red Rubber Catheter(2)  Treatment Diagnosis: Esophogeal Perforation, s/p esophagectomy   Quantity:  1 Device   Refills:  0       * Notice:  This list has 2 medication(s) that are the same as other medications prescribed for you. Read the directions carefully, and ask your doctor or other care provider to review them with you.             Protect others around you: Learn how to safely use, store and throw away your medicines at www.disposemymeds.org.             Medication List: This is a list of all your medications and when to take them. Check marks below indicate your daily home schedule. Keep this list as a reference.      Medications           Morning Afternoon Evening Bedtime As Needed     acetaminophen 32 mg/mL solution   Commonly known as:  TYLENOL   30.45 mLs (975 mg) by Per Feeding Tube route 3 times daily as needed                                BENADRYL PO   Take 25 mg by mouth nightly as needed                                cholestyramine 4 G Packet   Commonly known as:  QUESTRAN   Take 1 packet by mouth daily                                CULTURELLE PO   Take 1 tablet by mouth 2 times daily                                diphenoxylate-atropine 2.5-0.025 MG per tablet   Commonly known as:  LOMOTIL   Take 1 tablet by mouth 3 times daily as needed                                ferrous sulfate 300 (60 FE) MG/5ML syrup   5 mLs (300 mg) by Per J Tube route daily                                fiber modular packet   1 packet by Per Feeding Tube route 3 times daily (with meals)                                hydrOXYzine 25 MG capsule   Commonly known as:  VISTARIL   Take 25 mg by mouth as needed                                loperamide 1 MG/5ML liquid   Commonly known as:  IMODIUM   Take 20 mLs (4 mg) by mouth 4 times daily as needed for diarrhea                                MELATONIN PO   Take 5 mg by mouth At Bedtime                                multivitamins with minerals Liqd liquid   Take 15 mLs by mouth daily                                ondansetron 4 MG ODT tab   Commonly known as:  ZOFRAN-ODT   Take 1 tablet (4 mg) by mouth every 6 hours as needed for nausea or vomiting                                * order for DME   Equipment being ordered:  [facility] Suction Machine-Intermittent Suction Canisters(2) Suction Canister Holders(2) Suction Connect Tube(2) 5 in 1 Connector(2) Bacteria Filter(2) Yaunkauer Suction(2)  Treatment Diagnosis: Esophogeal Perforation, s/p esophagectomy                                * order for DME   Equipment being ordered:  Suction Pump Suction Canister(2) Suction Tubing(2) Bacteria Filter(2) Yaunkauer(4) Red Rubber Catheter(2)  Treatment Diagnosis: Esophogeal  Perforation, s/p esophagectomy                                oxyCODONE IR 5 MG tablet   Commonly known as:  ROXICODONE   Take 1 tablet (5 mg) by mouth See Admin Instructions One tablet every 4-6 hours as needed for pain                                pantoprazole 40 MG EC tablet   Commonly known as:  PROTONIX   Take by mouth 30-60 minutes before a meal.                                tiZANidine 4 MG capsule   Commonly known as:  ZANAFLEX   Take 4 mg by mouth 3 times daily                                TRAMADOL HCL PO   Take 25 mg by mouth every 6 hours as needed for moderate to severe pain                                traZODone 100 MG tablet   Commonly known as:  DESYREL   0.5 tablets (50 mg) by Per G Tube route At Bedtime                                UNABLE TO FIND   2 nell supplement 4 cans daily per g tube                                * Notice:  This list has 2 medication(s) that are the same as other medications prescribed for you. Read the directions carefully, and ask your doctor or other care provider to review them with you.

## 2017-11-03 NOTE — IP AVS SNAPSHOT
Post Anesthesia Care Unit [address] [address]    Phone:  [phone]                                       After Visit Summary   11/3/2017    Minerva Blanco    MRN: [id number]           After Visit Summary Signature Page     I have received my discharge instructions, and my questions have been answered. I have discussed any challenges I see with this plan with the nurse or doctor.    ..........................................................................................................................................  Patient/Patient Representative Signature      ..........................................................................................................................................  Patient Representative Print Name and Relationship to Patient    ..................................................               ................................................  Date                                            Time    ..........................................................................................................................................  Reviewed by Signature/Title    ...................................................              ..............................................  Date                                                            Time

## 2017-11-03 NOTE — OR NURSING
Paged Umm Cabrera again. Recalled Dr. Barreto in the OR. Then paged Dr. Barreto for a brief op note and discharge order.

## 2017-11-15 ENCOUNTER — APPOINTMENT (OUTPATIENT)
Dept: GENERAL RADIOLOGY | Facility: CLINIC | Age: 72
End: 2017-11-15
Attending: EMERGENCY MEDICINE
Payer: MEDICARE

## 2017-11-15 ENCOUNTER — HOSPITAL ENCOUNTER (EMERGENCY)
Facility: CLINIC | Age: 72
Discharge: HOME | End: 2017-11-15
Attending: EMERGENCY MEDICINE | Admitting: EMERGENCY MEDICINE
Payer: MEDICARE

## 2017-11-15 ENCOUNTER — APPOINTMENT (OUTPATIENT)
Dept: CT IMAGING | Facility: CLINIC | Age: 72
End: 2017-11-15
Attending: EMERGENCY MEDICINE
Payer: MEDICARE

## 2017-11-15 ENCOUNTER — HOSPITAL ENCOUNTER (OUTPATIENT)
Dept: PALLIATIVE MEDICINE | Facility: OTHER | Age: 72
Discharge: HOME | End: 2017-11-15
Attending: NURSE PRACTITIONER

## 2017-11-15 VITALS
RESPIRATION RATE: 18 BRPM | SYSTOLIC BLOOD PRESSURE: 120 MMHG | TEMPERATURE: 97.8 F | OXYGEN SATURATION: 99 % | WEIGHT: 96 LBS | DIASTOLIC BLOOD PRESSURE: 64 MMHG | BODY MASS INDEX: 18.75 KG/M2

## 2017-11-15 DIAGNOSIS — K94.13 JEJUNOSTOMY MALFUNCTION (H): ICD-10-CM

## 2017-11-15 DIAGNOSIS — L03.311 CELLULITIS OF ABDOMINAL WALL: ICD-10-CM

## 2017-11-15 DIAGNOSIS — G89.4 CHRONIC PAIN SYNDROME: ICD-10-CM

## 2017-11-15 LAB
ANION GAP SERPL CALCULATED.3IONS-SCNC: 4 MMOL/L (ref 3–14)
BASOPHILS # BLD AUTO: 0 10E9/L (ref 0–0.2)
BASOPHILS NFR BLD AUTO: 0.2 %
BUN SERPL-MCNC: 14 MG/DL (ref 7–30)
CALCIUM SERPL-MCNC: 9 MG/DL (ref 8.5–10.1)
CHLORIDE SERPL-SCNC: 101 MMOL/L (ref 94–109)
CO2 SERPL-SCNC: 31 MMOL/L (ref 20–32)
CREAT SERPL-MCNC: 0.48 MG/DL (ref 0.52–1.04)
DIFFERENTIAL METHOD BLD: ABNORMAL
EOSINOPHIL # BLD AUTO: 0.4 10E9/L (ref 0–0.7)
EOSINOPHIL NFR BLD AUTO: 3.2 %
ERYTHROCYTE [DISTWIDTH] IN BLOOD BY AUTOMATED COUNT: 13.5 % (ref 10–15)
GFR SERPL CREATININE-BSD FRML MDRD: >90 ML/MIN/1.7M2
GLUCOSE SERPL-MCNC: 80 MG/DL (ref 70–99)
HCT VFR BLD AUTO: 36.2 % (ref 35–47)
HGB BLD-MCNC: 11 G/DL (ref 11.7–15.7)
IMM GRANULOCYTES # BLD: 0 10E9/L (ref 0–0.4)
IMM GRANULOCYTES NFR BLD: 0.2 %
LACTATE BLD-SCNC: 1.1 MMOL/L (ref 0.7–2)
LYMPHOCYTES # BLD AUTO: 1.7 10E9/L (ref 0.8–5.3)
LYMPHOCYTES NFR BLD AUTO: 15.5 %
MCH RBC QN AUTO: 29.7 PG (ref 26.5–33)
MCHC RBC AUTO-ENTMCNC: 30.4 G/DL (ref 31.5–36.5)
MCV RBC AUTO: 98 FL (ref 78–100)
MONOCYTES # BLD AUTO: 0.8 10E9/L (ref 0–1.3)
MONOCYTES NFR BLD AUTO: 6.7 %
NEUTROPHILS # BLD AUTO: 8.3 10E9/L (ref 1.6–8.3)
NEUTROPHILS NFR BLD AUTO: 74.2 %
NRBC # BLD AUTO: 0 10*3/UL
NRBC BLD AUTO-RTO: 0 /100
PLATELET # BLD AUTO: 380 10E9/L (ref 150–450)
POTASSIUM SERPL-SCNC: 4.1 MMOL/L (ref 3.4–5.3)
RADIOLOGIST FLAGS: ABNORMAL
RBC # BLD AUTO: 3.7 10E12/L (ref 3.8–5.2)
SODIUM SERPL-SCNC: 136 MMOL/L (ref 133–144)
WBC # BLD AUTO: 11.2 10E9/L (ref 4–11)

## 2017-11-15 PROCEDURE — 96374 THER/PROPH/DIAG INJ IV PUSH: CPT | Mod: 59 | Performed by: EMERGENCY MEDICINE

## 2017-11-15 PROCEDURE — 74177 CT ABD & PELVIS W/CONTRAST: CPT

## 2017-11-15 PROCEDURE — 80048 BASIC METABOLIC PNL TOTAL CA: CPT | Performed by: EMERGENCY MEDICINE

## 2017-11-15 PROCEDURE — 25000128 H RX IP 250 OP 636: Performed by: EMERGENCY MEDICINE

## 2017-11-15 PROCEDURE — 96361 HYDRATE IV INFUSION ADD-ON: CPT | Performed by: EMERGENCY MEDICINE

## 2017-11-15 PROCEDURE — 96376 TX/PRO/DX INJ SAME DRUG ADON: CPT | Performed by: EMERGENCY MEDICINE

## 2017-11-15 PROCEDURE — 40000986 XR ABDOMEN 1 VW

## 2017-11-15 PROCEDURE — 85025 COMPLETE CBC W/AUTO DIFF WBC: CPT | Performed by: EMERGENCY MEDICINE

## 2017-11-15 PROCEDURE — 99285 EMERGENCY DEPT VISIT HI MDM: CPT | Mod: 25 | Performed by: EMERGENCY MEDICINE

## 2017-11-15 PROCEDURE — 96375 TX/PRO/DX INJ NEW DRUG ADDON: CPT | Performed by: EMERGENCY MEDICINE

## 2017-11-15 PROCEDURE — 83605 ASSAY OF LACTIC ACID: CPT | Performed by: EMERGENCY MEDICINE

## 2017-11-15 PROCEDURE — 25000125 ZZHC RX 250: Performed by: EMERGENCY MEDICINE

## 2017-11-15 PROCEDURE — 99285 EMERGENCY DEPT VISIT HI MDM: CPT | Mod: Z6 | Performed by: EMERGENCY MEDICINE

## 2017-11-15 RX ORDER — HYDROMORPHONE HYDROCHLORIDE 1 MG/ML
0.5 INJECTION, SOLUTION INTRAMUSCULAR; INTRAVENOUS; SUBCUTANEOUS
Status: COMPLETED | OUTPATIENT
Start: 2017-11-15 | End: 2017-11-15

## 2017-11-15 RX ORDER — FENTANYL CITRATE 50 UG/ML
50 INJECTION, SOLUTION INTRAMUSCULAR; INTRAVENOUS ONCE
Status: COMPLETED | OUTPATIENT
Start: 2017-11-15 | End: 2017-11-15

## 2017-11-15 RX ORDER — IOPAMIDOL 755 MG/ML
59 INJECTION, SOLUTION INTRAVASCULAR ONCE
Status: COMPLETED | OUTPATIENT
Start: 2017-11-15 | End: 2017-11-15

## 2017-11-15 RX ORDER — CEPHALEXIN 250 MG/5ML
500 POWDER, FOR SUSPENSION ORAL 3 TIMES DAILY
Qty: 150 ML | Refills: 0 | Status: SHIPPED | OUTPATIENT
Start: 2017-11-15 | End: 2017-11-20

## 2017-11-15 RX ORDER — CEPHALEXIN 125 MG/5ML
500 POWDER, FOR SUSPENSION ORAL 3 TIMES DAILY
Qty: 300 ML | Refills: 0 | Status: SHIPPED | OUTPATIENT
Start: 2017-11-15 | End: 2017-11-15

## 2017-11-15 RX ORDER — SODIUM CHLORIDE 9 MG/ML
1000 INJECTION, SOLUTION INTRAVENOUS CONTINUOUS
Status: DISCONTINUED | OUTPATIENT
Start: 2017-11-15 | End: 2017-11-16 | Stop reason: HOSPADM

## 2017-11-15 RX ADMIN — LIDOCAINE HYDROCHLORIDE 10 ML: 20 JELLY TOPICAL at 20:40

## 2017-11-15 RX ADMIN — HYDROMORPHONE HYDROCHLORIDE 0.5 MG: 10 INJECTION, SOLUTION INTRAMUSCULAR; INTRAVENOUS; SUBCUTANEOUS at 17:18

## 2017-11-15 RX ADMIN — HYDROMORPHONE HYDROCHLORIDE 0.5 MG: 10 INJECTION, SOLUTION INTRAMUSCULAR; INTRAVENOUS; SUBCUTANEOUS at 16:47

## 2017-11-15 RX ADMIN — HYDROMORPHONE HYDROCHLORIDE 0.5 MG: 10 INJECTION, SOLUTION INTRAMUSCULAR; INTRAVENOUS; SUBCUTANEOUS at 18:57

## 2017-11-15 RX ADMIN — FENTANYL CITRATE 50 MCG: 50 INJECTION, SOLUTION INTRAMUSCULAR; INTRAVENOUS at 21:08

## 2017-11-15 RX ADMIN — DIATRIZOATE MEGLUMINE AND DIATRIZOATE SODIUM 30 ML: 660; 100 SOLUTION ORAL; RECTAL at 22:30

## 2017-11-15 RX ADMIN — SODIUM CHLORIDE 1000 ML: 900 INJECTION, SOLUTION INTRAVENOUS at 16:47

## 2017-11-15 RX ADMIN — IOPAMIDOL 59 ML: 755 INJECTION, SOLUTION INTRAVENOUS at 19:17

## 2017-11-15 ASSESSMENT — MIFFLIN-ST. JEOR: SCORE: 883.7

## 2017-11-15 ASSESSMENT — ENCOUNTER SYMPTOMS
ABDOMINAL PAIN: 1
CHILLS: 0
FEVER: 0

## 2017-11-15 NOTE — ED AVS SNAPSHOT
[facility], [place], Emergency Department    [address] [address]    Phone:  [phone]                                       Minerva Blanco   MRN: [id number]    Department:  [facility], Emergency Department   Date of Visit:  11/15/2017           After Visit Summary Signature Page     I have received my discharge instructions, and my questions have been answered. I have discussed any challenges I see with this plan with the nurse or doctor.    ..........................................................................................................................................  Patient/Patient Representative Signature      ..........................................................................................................................................  Patient Representative Print Name and Relationship to Patient    ..................................................               ................................................  Date                                            Time    ..........................................................................................................................................  Reviewed by Signature/Title    ...................................................              ..............................................  Date                                                            Time

## 2017-11-15 NOTE — ED PROVIDER NOTES
History     Chief Complaint   Patient presents with     Abdominal Pain     HPI  Minerva Blanco is a 72 year old female with a history of MS, Meniere's disease, fibromyalgia, breast cancer, atrial fibrillation, and esophageal stricture and perforation s/p esophagectomy with J tube who presents to the Emergency Department with her  for evaluation of pain around her J tube. The patient reports redness and 8-9/10 pain in her abdomen around her J tube site. She states has had problems with her feeding tube before. The tube was placed two years ago and last replaced two months ago after the tube fell out completely. She denies any fevers or chills.    Past Medical History:   Diagnosis Date     Arrhythmia     one time event; no longer on meds     Atrial fibrillation (H)      Breast cancer (H) 2007    right side - lumpectomy, chemo, and local radiation     Chronic infection     infection/wound for two years after esoph surgery     Esophageal perforation      Fibromyalgia      GERD (gastroesophageal reflux disease)      Hiatal hernia      History of blood transfusion      IBS (irritable bowel syndrome)      Meniere's disease     deaf left ear     MS (multiple sclerosis) (H)      Multiple sclerosis (H)      Noninfectious ileitis      Other chronic pain        Past Surgical History:   Procedure Laterality Date     APPENDECTOMY       BACK SURGERY  5/1/2015    lumbar fusion     BRONCHOSCOPY FLEXIBLE N/A 4/17/2017    Procedure: BRONCHOSCOPY FLEXIBLE;;  Surgeon: Jens Wise MD;  [facility]     C GASTROSTOMY/JEJUN TUBE      J tube for feedings     CLOSE SPIT FISTULA N/A 4/17/2017    Procedure: CLOSE SPIT FISTULA;;  Surgeon: Jens Wise MD;  [facility]     COMBINED ESOPHAGOSCOPY, GASTROSCOPY, DUODENOSCOPY (EGD), REMOVE ESOPHAGEAL STENT N/A 8/26/2016    Procedure: COMBINED ESOPHAGOSCOPY, GASTROSCOPY, DUODENOSCOPY (EGD), REMOVE ESOPHAGEAL STENT;  Surgeon: Jens Wise MD;   [facility]     COMBINED ESOPHAGOSCOPY, GASTROSCOPY, DUODENOSCOPY (EGD), REPLACE ESOPHAGEAL STENT N/A 8/25/2017    Procedure: COMBINED ESOPHAGOSCOPY, GASTROSCOPY, DUODENOSCOPY (EGD), REPLACE ESOPHAGEAL STENT;;  Surgeon: Jens Wise MD;  [facility]     COMBINED ESOPHAGOSCOPY, GASTROSCOPY, DUODENOSCOPY (EGD), REPLACE ESOPHAGEAL STENT N/A 9/15/2017    Procedure: COMBINED ESOPHAGOSCOPY, GASTROSCOPY, DUODENOSCOPY (EGD), REPLACE ESOPHAGEAL STENT;  Upper Endoscopy with Stent Exchange, Cauterization of Tracheosophageal Fistula, Cauterization of Chest Wound   ;  Surgeon: Jens Wise MD;  [facility]     COMBINED ESOPHAGOSCOPY, GASTROSCOPY, DUODENOSCOPY (EGD), REPLACE ESOPHAGEAL STENT N/A 10/20/2017    Procedure: COMBINED ESOPHAGOSCOPY, GASTROSCOPY, DUODENOSCOPY (EGD), REPLACE ESOPHAGEAL STENT;  Upper Endoscopy with Stent Exchange, Cauterization Tracheosphageal Fistula Tract;  Surgeon: Nalini Barreto MD;  [facility]     COMBINED ESOPHAGOSCOPY, GASTROSCOPY, DUODENOSCOPY (EGD), REPLACE ESOPHAGEAL STENT N/A 11/3/2017    Procedure: COMBINED ESOPHAGOSCOPY, GASTROSCOPY, DUODENOSCOPY (EGD), REPLACE ESOPHAGEAL STENT;  Esophagogastroduodenoscopy, Stent Revision, Cauterization Of Traceoesophageal Fistula and stent exchange;  Surgeon: Nalini Barreto MD;  [facility]     CREATE SPIT FISTULA N/A 1/9/2017    Procedure: CREATE SPIT FISTULA;  Surgeon: Jens Wise MD;  [facility]     ESOPHAGECTOMY N/A 1/9/2017    Procedure: ESOPHAGECTOMY;  Surgeon: Jens Wise MD;  [facility]     ESOPHAGOSCOPY, GASTROSCOPY, DUODENOSCOPY (EGD), COMBINED N/A 4/18/2016    Procedure: COMBINED ESOPHAGOSCOPY, GASTROSCOPY, DUODENOSCOPY (EGD);  Surgeon: Alexis Barraza MD;  [facility]     ESOPHAGOSCOPY, GASTROSCOPY, DUODENOSCOPY (EGD), COMBINED N/A 4/25/2016    Procedure: COMBINED ESOPHAGOSCOPY, GASTROSCOPY, DUODENOSCOPY (EGD);  Surgeon: Linda Bravo,  MD Alexis;  [facility]     ESOPHAGOSCOPY, GASTROSCOPY, DUODENOSCOPY (EGD), COMBINED N/A 5/4/2016    Procedure: COMBINED ESOPHAGOSCOPY, GASTROSCOPY, DUODENOSCOPY (EGD);  Surgeon: Alexis Barraza MD;  [facility]     ESOPHAGOSCOPY, GASTROSCOPY, DUODENOSCOPY (EGD), COMBINED N/A 5/18/2016    Procedure: COMBINED ESOPHAGOSCOPY, GASTROSCOPY, DUODENOSCOPY (EGD);  Surgeon: Alexis Barraza MD;  [facility]     ESOPHAGOSCOPY, GASTROSCOPY, DUODENOSCOPY (EGD), COMBINED N/A 6/22/2016    Procedure: COMBINED ESOPHAGOSCOPY, GASTROSCOPY, DUODENOSCOPY (EGD);  Surgeon: Alexis Barraza MD;  [facility]     ESOPHAGOSCOPY, GASTROSCOPY, DUODENOSCOPY (EGD), COMBINED N/A 7/12/2016    Procedure: COMBINED ESOPHAGOSCOPY, GASTROSCOPY, DUODENOSCOPY (EGD);  Surgeon: Jens Wise MD;  [facility]     ESOPHAGOSCOPY, GASTROSCOPY, DUODENOSCOPY (EGD), COMBINED N/A 4/21/2017    Procedure: COMBINED ESOPHAGOSCOPY, GASTROSCOPY, DUODENOSCOPY (EGD);  Esophagogastroduodenoscopy, Pharyngostomy Tube Placement ;  Surgeon: Jens Wise MD;  [facility]     ESOPHAGOSCOPY, GASTROSCOPY, DUODENOSCOPY (EGD), COMBINED N/A 5/19/2017    Procedure: COMBINED ESOPHAGOSCOPY, GASTROSCOPY, DUODENOSCOPY (EGD);  Upper Endoscopy, Irrigation and Debridement of Chest Wound ;  Surgeon: Nalini Barreto MD;  [facility]     ESOPHAGOSCOPY, GASTROSCOPY, DUODENOSCOPY (EGD), COMBINED N/A 5/23/2017    Procedure: COMBINED ESOPHAGOSCOPY, GASTROSCOPY, DUODENOSCOPY (EGD);;  Surgeon: Nalini Barreto MD;  [facility]     ESOPHAGOSCOPY, GASTROSCOPY, DUODENOSCOPY (EGD), COMBINED N/A 6/27/2017    Procedure: COMBINED ESOPHAGOSCOPY, GASTROSCOPY, DUODENOSCOPY (EGD);  Esophagogastroduodenoscopy With Removal And Replacement Of Esophageal Stent exchange. Exchange of pharyngtomy tube. Chest wound dressing change;  Surgeon: Nalini Barreto MD;  [facility]     ESOPHAGOSCOPY, GASTROSCOPY, DUODENOSCOPY (EGD),  COMBINED N/A 8/4/2017    Procedure: COMBINED ESOPHAGOSCOPY, GASTROSCOPY, DUODENOSCOPY (EGD);  Esophagogastroduodenoscopy, Stent Removal and Stent Replacement. Dressing Change ;  Surgeon: Nalini Barreto MD;  [facility]     ESOPHAGOSCOPY, GASTROSCOPY, DUODENOSCOPY (EGD), COMBINED N/A 8/25/2017    Procedure: COMBINED ESOPHAGOSCOPY, GASTROSCOPY, DUODENOSCOPY (EGD);  Esophagogastroduodenoscopy,  Stent Revision,  Cauterization;  Surgeon: Jens Wise MD;  [facility]     ESOPHAGOSCOPY, GASTROSCOPY, DUODENOSCOPY (EGD), COMBINED N/A 10/2/2017    Procedure: COMBINED ESOPHAGOSCOPY, GASTROSCOPY, DUODENOSCOPY (EGD);  Esophagogastroduodenostomy, Replacement of Esophageal Stent, Cauterization of Tracheosophageal Fistula anc chest wall,   C-arm;  Surgeon: Nalini Barreto MD;  [facility]     ESOPHAGOSCOPY, GASTROSCOPY, DUODENOSCOPY (EGD), DILATATION, COMBINED N/A 6/29/2016    Procedure: COMBINED ESOPHAGOSCOPY, GASTROSCOPY, DUODENOSCOPY (EGD), DILATATION;  Surgeon: Jens Wise MD;  [facility]     EXPLORE NECK N/A 5/19/2017    Procedure: EXPLORE NECK;;  Surgeon: Nalini Barreto MD;  [facility]     HC UGI ENDOSCOPY W TRANSENDOSCOPIC STENT PLACEMENT N/A 7/12/2016    Procedure: COMBINED ESOPHAGOSCOPY, GASTROSCOPY, DUODENOSCOPY (EGD), PLACE TRANSENDOSCOPIC ESOPHAGEAL STENT;  Surgeon: Jens Wise MD;  [facility]     HC UGI ENDOSCOPY W TRANSENDOSCOPIC STENT PLACEMENT N/A 7/22/2016    Procedure: COMBINED ESOPHAGOSCOPY, GASTROSCOPY, DUODENOSCOPY (EGD), PLACE TRANSENDOSCOPIC ESOPHAGEAL STENT;  Surgeon: Jens Wise MD;  [facility]     INCISION AND DRAINAGE CHEST WASHOUT, COMBINED N/A 5/27/2017    Procedure: COMBINED INCISION AND DRAINAGE CHEST WASHOUT;  Chest washout;  Surgeon: Nalini Barreto MD;  [facility]     INCISION AND DRAINAGE CHEST WASHOUT, COMBINED N/A 5/28/2017    Procedure: COMBINED INCISION AND DRAINAGE CHEST WASHOUT;  Chest washout;   Surgeon: Nalini Barreto MD;  [facility]     INCISION AND DRAINAGE CHEST WASHOUT, COMBINED N/A 6/9/2017    Procedure: COMBINED INCISION AND DRAINAGE CHEST WASHOUT;  Chest washout and dressing change, Esophaogastroduodenoscopy;  Surgeon: Jens Wise MD;  [facility]     INCISION AND DRAINAGE CHEST WASHOUT, COMBINED N/A 7/17/2017    Procedure: COMBINED INCISION AND DRAINAGE CHEST WASHOUT;  Incision and Drainage of right Chest Wound, Esophagogastroduodenoscopy with stent exchange. pharangostomy tube revision;  Surgeon: Jens Wise MD;  [facility]     IRRIGATION AND DEBRIDEMENT CHEST WASHOUT, COMBINED N/A 6/29/2016    Procedure: COMBINED IRRIGATION AND DEBRIDEMENT CHEST WASHOUT;  Surgeon: Jens Wise MD;  [facility]     IRRIGATION AND DEBRIDEMENT CHEST WASHOUT, COMBINED N/A 5/23/2017    Procedure: COMBINED IRRIGATION AND DEBRIDEMENT CHEST WASHOUT;  COMBINED IRRIGATION AND DEBRIDEMENT CHEST WASHOUT,COMBINED ESOPHAGOSCOPY, GASTROSCOPY, DUODENOSCOPY (EGD) ;  Surgeon: Nalini Barreto MD;  [facility]     IRRIGATION AND DEBRIDEMENT CHEST WASHOUT, COMBINED N/A 5/24/2017    Procedure: COMBINED IRRIGATION AND DEBRIDEMENT CHEST WASHOUT;  Chest wound Washout and Dressing Change;  Surgeon: Nalini Barreto MD;  [facility]     IRRIGATION AND DEBRIDEMENT CHEST WASHOUT, COMBINED N/A 5/25/2017    Procedure: COMBINED IRRIGATION AND DEBRIDEMENT CHEST WASHOUT;  Irrigation and Debridement Chest Washout and dressing change;  Surgeon: Nalini Barreto MD;  [facility]     IRRIGATION AND DEBRIDEMENT CHEST WASHOUT, COMBINED N/A 5/26/2017    Procedure: COMBINED IRRIGATION AND DEBRIDEMENT CHEST WASHOUT;  Irrigation and Debridement Chest Washout with  dressing change;  Surgeon: Nalini Barreto MD;  [facility]     IRRIGATION AND DEBRIDEMENT CHEST WASHOUT, COMBINED N/A 5/30/2017    Procedure: COMBINED IRRIGATION AND DEBRIDEMENT CHEST WASHOUT;  Irrigation and Debridement  Chest Washout , PICC line dressing change;  Surgeon: Nalini Barreto MD;  [facility]     IRRIGATION AND DEBRIDEMENT CHEST WASHOUT, COMBINED N/A 5/31/2017    Procedure: COMBINED IRRIGATION AND DEBRIDEMENT CHEST WASHOUT;  Irrigation and Debridement Chest Washout ;  Surgeon: Nalini Barreto MD;  [facility]     IRRIGATION AND DEBRIDEMENT CHEST WASHOUT, COMBINED N/A 6/1/2017    Procedure: COMBINED IRRIGATION AND DEBRIDEMENT CHEST WASHOUT;  Chest Wound Irrigation And Debridement with dressing change ;  Surgeon: Nalini Barreto MD;  [facility]     IRRIGATION AND DEBRIDEMENT CHEST WASHOUT, COMBINED N/A 6/4/2017    Procedure: COMBINED IRRIGATION AND DEBRIDEMENT CHEST WASHOUT;  COMBINED IRRIGATION AND DEBRIDEMENT CHEST WASHOUT, Esophagoscopy, Gastroscopy, Duodenoscopy (EGD) place transendoscopic esophageal stent,   Pharyngostomy and chest wall tube exchange  C-Arm;  Surgeon: Jens Wise MD;  [facility]     IRRIGATION AND DEBRIDEMENT CHEST WASHOUT, COMBINED N/A 6/2/2017    Procedure: COMBINED IRRIGATION AND DEBRIDEMENT CHEST WASHOUT;  Chest Wound Irrigation and Debridement, Dressing Change;  Surgeon: Nalini Barreto MD;  [facility]     LAPAROSCOPIC ASSISTED INSERTION TUBE JEJUNOSTOMY N/A 4/17/2017    Procedure: LAPAROSCOPIC ASSISTED INSERTION TUBE JEJUNOSTOMY;;  Surgeon: Jens Wise MD;  [facility]     LAPAROSCOPY DIAGNOSTIC (GENERAL) N/A 1/9/2017    Procedure: LAPAROSCOPY DIAGNOSTIC (GENERAL);  Surgeon: Jens Wise MD;  [facility]     LAPAROSCOPY DIAGNOSTIC (GENERAL) N/A 4/17/2017    Procedure: LAPAROSCOPY DIAGNOSTIC (GENERAL);  Laparoscopic , Neck Dissection, Spit Fistula Takedown, Laparoscopic Jejunostomy Tube and Pharyngostomy Tube, Gastric Pull up, Upper Endoscopy(EGD)  , Flexible Bronchoscopy ,Sternotomy ;  Surgeon: Jens Wise MD;  [facility]     LUMPECTOMY BREAST Right 2007     NERVE BLOCK PERIPHERAL N/A 8/30/2016     "Procedure: NERVE BLOCK PERIPHERAL;  Surgeon: GENERIC ANESTHESIA PROVIDER;  [facility]     NISSEN FUNDOPLICATION  1/2016     PHARYNGOSTOMY N/A 4/18/2016    Procedure: PHARYNGOSTOMY;  Surgeon: Alexis Barraza MD;  [facility]     PHARYNGOSTOMY N/A 4/25/2016    Procedure: PHARYNGOSTOMY;  Surgeon: Alexis Barraza MD;  [facility]     PHARYNGOSTOMY N/A 5/4/2016    Procedure: PHARYNGOSTOMY;  Surgeon: Alexis Barraza MD;  [facility]     PHARYNGOSTOMY N/A 6/22/2016    Procedure: PHARYNGOSTOMY;  Surgeon: Alexis Barraza MD;  [facility]     PHARYNGOSTOMY N/A 6/29/2016    Procedure: PHARYNGOSTOMY;  Surgeon: Jens Wise MD;  [facility]     PHARYNGOSTOMY N/A 4/21/2017    Procedure: PHARYNGOSTOMY;;  Surgeon: Jens Wise MD;  [facility]     PHARYNGOSTOMY Left 5/31/2017    Procedure: PHARYNGOSTOMY;;  Surgeon: Nalini Barreto MD;  [facility]     PICC INSERTION Left 8/25/2016    5fr DL BioFlo PICC, 42cm (3cm external) in the L medial brachial vein w/ tip in the SVC RA junction.     PICC INSERTION - Rewire Right 05/31/2017    5fr DL BioFlo PICC, 40cm (6cm external) in the R medial brachial vein w/ tip in the SVC RA junction.     REPAIR FISTULA TRACHEOESOPHAGEAL N/A 9/15/2017    Procedure: REPAIR FISTULA TRACHEOESOPHAGEAL;;  Surgeon: Jens Wise MD;  [facility]     THORACOTOMY Right 1998    lung infection - \"hard crust formed on lung\"     THORACOTOMY Left 1/9/2017    Procedure: THORACOTOMY;  Surgeon: Jens Wise MD;  [facility]     WRIST SURGERY         Family History   Problem Relation Age of Onset     Alzheimer Disease Mother      CEREBROVASCULAR DISEASE Father      cerebral hemorrhage     Ovarian Cancer Sister      Breast Cancer Daughter        Social History   Substance Use Topics     Smoking status: Former Smoker     Packs/day: 1.00     Years: 15.00     Types: Cigarettes     Quit date: " 1/1/1998     Smokeless tobacco: Never Used     Alcohol use No       No current facility-administered medications for this encounter.      Current Outpatient Prescriptions   Medication     diphenoxylate-atropine (LOMOTIL) 2.5-0.025 MG per tablet     ferrous sulfate 300 (60 FE) MG/5ML syrup     TRAMADOL HCL PO     acetaminophen (TYLENOL) 32 mg/mL solution     oxyCODONE (ROXICODONE) 5 MG IR tablet     hydrOXYzine (VISTARIL) 25 MG capsule     MELATONIN PO     UNABLE TO FIND     pantoprazole (PROTONIX) 40 MG EC tablet     fiber modular (NUTRISOURCE FIBER) packet     traZODone (DESYREL) 100 MG tablet     order for DME     order for DME     DiphenhydrAMINE HCl (BENADRYL PO)     cholestyramine (QUESTRAN) 4 G Packet     multivitamins with minerals (CERTAVITE/CEROVITE) LIQD liquid     loperamide (IMODIUM) 1 MG/5ML liquid     Lactobacillus Rhamnosus, GG, (CULTURELLE PO)        Allergies   Allergen Reactions     Amoxicillin Diarrhea     Ativan [Lorazepam] Other (See Comments)     Hallucinations     Morphine Itching         I have reviewed the Medications, Allergies, Past Medical and Surgical History, and Social History in the Epic system.    Review of Systems   Constitutional: Negative for chills and fever.   Gastrointestinal: Positive for abdominal pain (around GJ tube).   All other systems reviewed and are negative.      Physical Exam   BP: 123/59  Heart Rate: 75  Temp: 97.8  F (36.6  C)  Resp: 18  Weight: 43.5 kg (96 lb)  SpO2: 98 %      Physical Exam   General: patient is alert and oriented, uncomfortable appearing  Head: atraumatic and normocephalic   EENT: moist mucus membranes, pupils round and reactive   Neck: supple   Cardiovascular: regular rate and rhythm, extremities warm and well perfused, no lower extremity edema  Pulmonary: lungs clear to auscultation bilaterally   Abdomen: soft, TTP at jtube site, skin surrounding is red, warm and tender, no purulent discharge  Musculoskeletal: normal range of motion    Neurological: alert and oriented, moving all extremities symmetrically, gait normal   Skin: warm, dry       ED Course     ED Course     Procedures             Critical Care time:  none             Labs Ordered and Resulted from Time of ED Arrival Up to the Time of Departure from the ED - No data to display         Assessments & Plan (with Medical Decision Making)   72 year old female with a history of multiple sclerosis, esophageal perforation s/p surgical interventions and esophagectomy, atrial fibrillation, and J tube dependent who presents with pain at her J-tube site. She reports that she has had increasing pain and erythema and the area is extremely tender to touch. Her skin is warm, red, and very tender to palpation with a slight degree of induration. She denies any fevers. I have obtained a CBC, BMP, and lactate which are significant for WBC of 11.3. CT of the abdomen shows inflammatory changes and malpositioned balloon, no abscess. She was given analgesics and IV fluids. Attempted repositioning of the J-tube however did not advance and then fell out.  12F ojeda replaced and repeat xray ordered and pending. Discussed with IR and will be replaced tomorrow.  She will require sedation for the procedure.  Patient does not wish to stay for exchange and is able to take small amounts of water by mouth to stay hydrated.  She does not have any essential medications to take tonight or in the morning.  Will discharge with cephalexin for localized cellulitis to begin tomorrow and return instructions.        This part of the document was transcribed by Sanya Gates, Medical Scribe.    I have reviewed the nursing notes.    I have reviewed the findings, diagnosis, plan and need for follow up with the patient.    New Prescriptions    CEPHALEXIN (KEFLEX) 250 MG/5ML SUSPENSION    10 mLs (500 mg) by Per J Tube route 3 times daily for 5 days       Final diagnoses:   Jejunostomy malfunction (H)   Cellulitis of abdominal wall    I, Sanya Gates, am serving as a trained medical scribe to document services personally performed by Renate Joel MD, based on the provider's statements to me.      I, Renate Joel MD, was physically present and have reviewed and verified the accuracy of this note documented by Sanya Gates.       11/15/2017   [facility], [place], EMERGENCY DEPARTMENT     Renate Joel MD  11/15/17 [id number]

## 2017-11-15 NOTE — ED AVS SNAPSHOT
[facility], Emergency Department    [address], [place] [address]    Phone:  [phone]                                       Minerva Blanco   MRN: [id number]    Department:  [facility], Emergency Department   Date of Visit:  11/15/2017           Patient Information     Date Of Birth          1945        Your diagnoses for this visit were:     Jejunostomy malfunction (H)     Cellulitis of abdominal wall        You were seen by Renate Joel MD and Umm Perry MD.        Discharge Instructions       Please make an appointment to follow up with interventional radiology tomorrow for tube replacement.     Do not eat anything after midnight.     Begin cephalexin via J-tube after replacement.         Discharge Instructions for Cellulitis  You have been diagnosed with cellulitis. This is an infection in the deepest layer of the skin. In some cases, the infection also affects the muscle. Cellulitis is caused by bacteria. The bacteria can enter the body through broken skin. This can happen with a cut, scratch, animal bite, or an insect bite that has been scratched. You may have been treated in the hospital with antibiotics and fluids. You will likely be given a prescription for antibiotics to take at home. This sheet will help you take care of yourself at home.  Home care  When you are home:    Take the prescribed antibiotic medicine you are given as directed until it is gone. Take it even if you feel better. It treats the infection and stops it from returning. Not taking all the medicine can make future infections hard to treat.    Keep the infected area clean.    When possible, raise the infected area above the level of your heart. This helps keep swelling down.    Talk with your healthcare provider if you are in pain. Ask what kind of over-the-counter medicine you can take for pain.    Apply clean bandages as advised.    Take your temperature once a day for a week.    Wash your  hands often to prevent spreading the infection.  In the future, wash your hands before and after you touch cuts, scratches, or bandages. This will help prevent infection.   When to call your healthcare provider  Call your healthcare provider immediately if you have any of the following:    Difficulty or pain when moving the joints above or below the infected area    Discharge or pus draining from the area    Fever of 100.4 F (38 C) or higher, or as directed by your healthcare provider    Pain that gets worse in or around the infected     Redness that gets worse in or around the infected area, particularly if the area of redness expands to a wider area    Shaking chills    Swelling of the infected area    Vomiting   Date Last Reviewed: 8/1/2016 2000-2017 [employer]. [address] [place] [address], [place] [address]. All rights reserved. This information is not intended as a substitute for professional medical care. Always follow your healthcare professional's instructions.            Future Appointments        Provider Department Dept Phone Center    11/22/2017 8:00 AM Jens Wise MD [facility] [phone] [facility]      24 Hour Appointment Hotline       To make an appointment at any [facility], call [phone] ([phone]). If you don't have a family doctor or clinic, we will help you find one. [facility] are conveniently located to serve the needs of you and your family.             Review of your medicines      START taking        Dose / Directions Last dose taken    cephalexin 250 MG/5ML suspension   Commonly known as:  KEFLEX   Dose:  500 mg   Quantity:  150 mL        10 mLs (500 mg) by Per J Tube route 3 times daily for 5 days   Refills:  0          Our records show that you are taking the medicines listed below. If these are incorrect, please call your family doctor or clinic.        Dose / Directions Last dose taken    acetaminophen 32 mg/mL solution    Commonly known as:  TYLENOL   Dose:  975 mg   Indication:  Pain   Quantity:  300 mL        30.45 mLs (975 mg) by Per Feeding Tube route 3 times daily as needed   Refills:  3        BENADRYL PO   Dose:  25 mg        Take 25 mg by mouth nightly as needed   Refills:  0        cholestyramine 4 G Packet   Commonly known as:  QUESTRAN   Dose:  1 packet        Take 1 packet by mouth daily   Refills:  0        CULTURELLE PO   Dose:  1 tablet        Take 1 tablet by mouth 2 times daily   Refills:  0        diphenoxylate-atropine 2.5-0.025 MG per tablet   Commonly known as:  LOMOTIL   Dose:  1 tablet   Quantity:  40 tablet        Take 1 tablet by mouth 3 times daily as needed   Refills:  1        ferrous sulfate 300 (60 FE) MG/5ML syrup   Dose:  300 mg   Quantity:  150 mL        5 mLs (300 mg) by Per J Tube route daily   Refills:  3        fiber modular packet   Dose:  1 packet   Quantity:  60 packet        1 packet by Per Feeding Tube route 3 times daily (with meals)   Refills:  0        hydrOXYzine 25 MG capsule   Commonly known as:  VISTARIL   Dose:  25 mg        Take 25 mg by mouth as needed   Refills:  0        loperamide 1 MG/5ML liquid   Commonly known as:  IMODIUM   Dose:  4 mg   Quantity:  200 mL        Take 20 mLs (4 mg) by mouth 4 times daily as needed for diarrhea   Refills:  0        MELATONIN PO   Dose:  5 mg        Take 5 mg by mouth At Bedtime   Refills:  0        multivitamins with minerals Liqd liquid   Dose:  15 mL        Take 15 mLs by mouth daily   Refills:  0        * order for DME   Quantity:  1 Device        Equipment being ordered:  [facility] Suction Machine-Intermittent Suction Canisters(2) Suction Canister Holders(2) Suction Connect Tube(2) 5 in 1 Connector(2) Bacteria Filter(2) Yaunkauer Suction(2)  Treatment Diagnosis: Esophogeal Perforation, s/p esophagectomy   Refills:  0        * order for DME   Quantity:  1 Device        Equipment being ordered:  Suction Pump Suction Canister(2) Suction  Tubing(2) Bacteria Filter(2) Yaunkauer(4) Red Rubber Catheter(2)  Treatment Diagnosis: Esophogeal Perforation, s/p esophagectomy   Refills:  0        oxyCODONE IR 5 MG tablet   Commonly known as:  ROXICODONE   Dose:  5 mg   Quantity:  84 tablet        Take 1 tablet (5 mg) by mouth See Admin Instructions One tablet every 4-6 hours as needed for pain   Refills:  0        pantoprazole 40 MG EC tablet   Commonly known as:  PROTONIX   Quantity:  30 tablet        Take by mouth 30-60 minutes before a meal.   Refills:  0        TRAMADOL HCL PO   Dose:  25 mg        Take 25 mg by mouth every 6 hours as needed for moderate to severe pain   Refills:  0        traZODone 100 MG tablet   Commonly known as:  DESYREL   Dose:  50 mg   Quantity:  30 tablet        0.5 tablets (50 mg) by Per G Tube route At Bedtime   Refills:  0        UNABLE TO FIND        2 nell supplement 4 cans daily per g tube   Refills:  0        * Notice:  This list has 2 medication(s) that are the same as other medications prescribed for you. Read the directions carefully, and ask your doctor or other care provider to review them with you.            Prescriptions were sent or printed at these locations (1 Prescription)                   Other Prescriptions                Printed at Department/Unit printer (1 of 1)         cephalexin (KEFLEX) 250 MG/5ML suspension                Procedures and tests performed during your visit     Basic metabolic panel    CBC with platelets differential    CT Abdomen Pelvis w Contrast    Lactic acid whole blood    XR Abdomen 1 View      Orders Needing Specimen Collection     None      Pending Results     Date and Time Order Name Status Description    11/15/2017 2108 XR Abdomen 1 View Preliminary             Pending Culture Results     No orders found from 11/13/2017 to 11/16/2017.            Pending Results Instructions     If you had any lab results that were not finalized at the time of your Discharge, you can call the ED Lab  Result RN at [phone]. You will be contacted by this team for any positive Lab results or changes in treatment. The nurses are available 7 days a week from 10A to 6:30P.  You can leave a message 24 hours per day and they will return your call.        Thank you for choosing [facility]       Thank you for choosing [facility] for your care. Our goal is always to provide you with excellent care. Hearing back from our patients is one way we can continue to improve our services. Please take a few minutes to complete the written survey that you may receive in the mail after you visit with us. Thank you!        [facility] Information     [employer] gives you secure access to your electronic health record. If you see a primary care provider, you can also send messages to your care team and make appointments. If you have questions, please call your primary care clinic.  If you do not have a primary care provider, please call [phone] and they will assist you.        Care EveryWhere ID     This is your Care EveryWhere ID. This could be used by other organizations to access your [facility] medical records  [id number]        Equal Access to Services     TOM JORGENSEN : Hadii davion Meng, wagillian romero, qaybjairon kamatt anderson, florentin amaya . So [facility] [phone].    ATENCIÓN: Si habla español, tiene a jackson disposición servicios gratuitos de asistencia lingüística. Llame al [phone].    We comply with applicable federal civil rights laws and [place] laws. We do not discriminate on the basis of race, color, national origin, age, disability, sex, sexual orientation, or gender identity.            After Visit Summary       This is your record. Keep this with you and show to your community pharmacist(s) and doctor(s) at your next visit.

## 2017-11-15 NOTE — ED NOTES
Minerva presents with c/o painful and swollen area around her G-tube for past few days. History of esophageal fistula, she uses the tube for feeding and medications. Denies fevers or drainage.

## 2017-11-16 ENCOUNTER — APPOINTMENT (OUTPATIENT)
Dept: INTERVENTIONAL RADIOLOGY/VASCULAR | Facility: CLINIC | Age: 72
End: 2017-11-16
Attending: PHYSICIAN ASSISTANT
Payer: MEDICARE

## 2017-11-16 ENCOUNTER — APPOINTMENT (OUTPATIENT)
Dept: MEDSURG UNIT | Facility: CLINIC | Age: 72
End: 2017-11-16
Attending: SPECIALIST
Payer: MEDICARE

## 2017-11-16 ENCOUNTER — ANESTHESIA EVENT (OUTPATIENT)
Dept: SURGERY | Facility: CLINIC | Age: 72
End: 2017-11-16
Payer: MEDICARE

## 2017-11-16 ENCOUNTER — HOSPITAL ENCOUNTER (OUTPATIENT)
Facility: CLINIC | Age: 72
Discharge: HOME | End: 2017-11-16
Attending: RADIOLOGY | Admitting: RADIOLOGY
Payer: MEDICARE

## 2017-11-16 VITALS
HEART RATE: 89 BPM | WEIGHT: 93 LBS | TEMPERATURE: 98.8 F | RESPIRATION RATE: 16 BRPM | OXYGEN SATURATION: 97 % | BODY MASS INDEX: 18.26 KG/M2 | SYSTOLIC BLOOD PRESSURE: 125 MMHG | DIASTOLIC BLOOD PRESSURE: 69 MMHG | HEIGHT: 60 IN

## 2017-11-16 DIAGNOSIS — Z97.8 USES FEEDING TUBE: Primary | ICD-10-CM

## 2017-11-16 PROCEDURE — C1887 CATHETER, GUIDING: HCPCS

## 2017-11-16 PROCEDURE — 25000128 H RX IP 250 OP 636: Performed by: PHYSICIAN ASSISTANT

## 2017-11-16 PROCEDURE — 49451 REPLACE DUOD/JEJ TUBE PERC: CPT

## 2017-11-16 PROCEDURE — 25000125 ZZHC RX 250

## 2017-11-16 PROCEDURE — A9270 NON-COVERED ITEM OR SERVICE: HCPCS | Mod: GY | Performed by: RADIOLOGY

## 2017-11-16 PROCEDURE — 25000132 ZZH RX MED GY IP 250 OP 250 PS 637: Mod: GY | Performed by: RADIOLOGY

## 2017-11-16 PROCEDURE — 99152 MOD SED SAME PHYS/QHP 5/>YRS: CPT

## 2017-11-16 PROCEDURE — 27211065 ZZ H TUBE GASTRO CR10

## 2017-11-16 PROCEDURE — 40000166 ZZH STATISTIC PP CARE STAGE 1

## 2017-11-16 PROCEDURE — 25000128 H RX IP 250 OP 636

## 2017-11-16 PROCEDURE — C1769 GUIDE WIRE: HCPCS

## 2017-11-16 PROCEDURE — 25000125 ZZHC RX 250: Performed by: RADIOLOGY

## 2017-11-16 PROCEDURE — 27210905 ZZH KIT CR7

## 2017-11-16 RX ORDER — LIDOCAINE 40 MG/G
CREAM TOPICAL
Status: DISCONTINUED | OUTPATIENT
Start: 2017-11-16 | End: 2017-11-16 | Stop reason: HOSPADM

## 2017-11-16 RX ORDER — OXYCODONE HYDROCHLORIDE 5 MG/1
5 TABLET ORAL ONCE
Status: COMPLETED | OUTPATIENT
Start: 2017-11-16 | End: 2017-11-16

## 2017-11-16 RX ORDER — NALOXONE HYDROCHLORIDE 0.4 MG/ML
.1-.4 INJECTION, SOLUTION INTRAMUSCULAR; INTRAVENOUS; SUBCUTANEOUS
Status: DISCONTINUED | OUTPATIENT
Start: 2017-11-16 | End: 2017-11-16 | Stop reason: HOSPADM

## 2017-11-16 RX ORDER — FENTANYL CITRATE 50 UG/ML
25-50 INJECTION, SOLUTION INTRAMUSCULAR; INTRAVENOUS EVERY 5 MIN PRN
Status: DISCONTINUED | OUTPATIENT
Start: 2017-11-16 | End: 2017-11-16 | Stop reason: HOSPADM

## 2017-11-16 RX ORDER — SODIUM CHLORIDE 9 MG/ML
INJECTION, SOLUTION INTRAVENOUS CONTINUOUS
Status: DISCONTINUED | OUTPATIENT
Start: 2017-11-16 | End: 2017-11-16 | Stop reason: HOSPADM

## 2017-11-16 RX ORDER — FLUMAZENIL 0.1 MG/ML
0.2 INJECTION, SOLUTION INTRAVENOUS
Status: DISCONTINUED | OUTPATIENT
Start: 2017-11-16 | End: 2017-11-16 | Stop reason: HOSPADM

## 2017-11-16 RX ADMIN — MIDAZOLAM 1 MG: 1 INJECTION INTRAMUSCULAR; INTRAVENOUS at 16:48

## 2017-11-16 RX ADMIN — LIDOCAINE HYDROCHLORIDE 5 ML: 20 JELLY TOPICAL at 17:18

## 2017-11-16 RX ADMIN — FENTANYL CITRATE 50 MCG: 50 INJECTION, SOLUTION INTRAMUSCULAR; INTRAVENOUS at 16:48

## 2017-11-16 RX ADMIN — FENTANYL CITRATE 50 MCG: 50 INJECTION, SOLUTION INTRAMUSCULAR; INTRAVENOUS at 17:06

## 2017-11-16 RX ADMIN — OXYCODONE HYDROCHLORIDE 5 MG: 5 TABLET ORAL at 18:27

## 2017-11-16 RX ADMIN — MIDAZOLAM 1 MG: 1 INJECTION INTRAMUSCULAR; INTRAVENOUS at 17:06

## 2017-11-16 RX ADMIN — FENTANYL CITRATE 50 MCG: 50 INJECTION, SOLUTION INTRAMUSCULAR; INTRAVENOUS at 16:59

## 2017-11-16 RX ADMIN — LIDOCAINE HYDROCHLORIDE 5 ML: 10 INJECTION, SOLUTION EPIDURAL; INFILTRATION; INTRACAUDAL; PERINEURAL at 17:15

## 2017-11-16 RX ADMIN — SODIUM CHLORIDE: 9 INJECTION, SOLUTION INTRAVENOUS at 16:03

## 2017-11-16 RX ADMIN — MIDAZOLAM 1 MG: 1 INJECTION INTRAMUSCULAR; INTRAVENOUS at 16:59

## 2017-11-16 NOTE — PROGRESS NOTES
Prep and teaching complete for  J tube exchange; , Enrique at bedside. Pt requests some sedation with procedure, MD aware. Consent complete.

## 2017-11-16 NOTE — ED PROVIDER NOTES
SIGN-OUT:  - Assumed care of this patient from Dr. Joel  - Pending at shift change/Tentative plan: follow-up abdominal x-ray for positioning of Whitt catheter  --- Per discussion with the original PETER RASMUSSEN and the patient and review of the R/B/A, the patient does not want to be admitted for further management and so the plan was for her to be D/C'd after the AXR, with plan to F/U with IR tomorrow and D/C w/ Abx, close outpatient F/U and strict return instructions (DCI already prepared by original PETER LOVELACE)    REASSESSMENT:  - No acute issues or interventions necessary while still in the emergency department.patient still feeling well, no concerning changes in vital signs.  - AXR: appears to have Whitt tube in appropriate position    DISPOSITION:  - Patient carried on with their original disposition plan.  - we'll have her follow up with IR tomorrow, close follow-up with her usual providers as an outpatient.  We reviewed  Strict return/safety instructions with the patient and her loved one. They expressed understanding and agreement with this plan. All questions answered to the best of our ability at this time.          Umm Perry MD  11/15/17 1463

## 2017-11-16 NOTE — PROGRESS NOTES
Patient is on IR schedule jejunostomy tube replacement today 11/16/17  Labs WNL for procedure.      Orders for NPO, scrubs and antibiotics have been entered.  Medications to be held include: none  Consent will be done prior to procedure.     Please [place] the [place] [phone] for estimated time of procedure.     Case discussed with Dr. Tess Villareal.    Freya Whitfield PA-C  Interventional Radiology  Phone: [phone]

## 2017-11-16 NOTE — PROGRESS NOTES
Patient Name: Minerva Blanco  Medical Record Number: [id number]  Today's Date: 11/16/2017    Procedure: Jejunostomy tube replacement  Proceduralist: MD Julio; MD Crystal; supervised by  MD Alexandrea    Sedation start time: 1650  Sedation end time: 1712  Sedation medications administered: Versed 3 mg, fentanyl 150mcg    Procedure start time: 1652  Puncture time: 1655  Procedure end time: 1712    Report given to: ROMAINE Restrepo 2A  : none    Other Notes: Pt arrived to IR room 4 from . Pt denies any questions or concerns regarding procedure, consent verified. Pt positioned supine and monitored per protocol. Pt tolerated procedure without any noted complications. Pt transferred back to .

## 2017-11-16 NOTE — DISCHARGE INSTRUCTIONS
Please make an appointment to follow up with interventional radiology tomorrow for tube replacement.     Do not eat anything after midnight.     Begin cephalexin via J-tube after replacement.         Discharge Instructions for Cellulitis  You have been diagnosed with cellulitis. This is an infection in the deepest layer of the skin. In some cases, the infection also affects the muscle. Cellulitis is caused by bacteria. The bacteria can enter the body through broken skin. This can happen with a cut, scratch, animal bite, or an insect bite that has been scratched. You may have been treated in the hospital with antibiotics and fluids. You will likely be given a prescription for antibiotics to take at home. This sheet will help you take care of yourself at home.  Home care  When you are home:    Take the prescribed antibiotic medicine you are given as directed until it is gone. Take it even if you feel better. It treats the infection and stops it from returning. Not taking all the medicine can make future infections hard to treat.    Keep the infected area clean.    When possible, raise the infected area above the level of your heart. This helps keep swelling down.    Talk with your healthcare provider if you are in pain. Ask what kind of over-the-counter medicine you can take for pain.    Apply clean bandages as advised.    Take your temperature once a day for a week.    Wash your hands often to prevent spreading the infection.  In the future, wash your hands before and after you touch cuts, scratches, or bandages. This will help prevent infection.   When to call your healthcare provider  Call your healthcare provider immediately if you have any of the following:    Difficulty or pain when moving the joints above or below the infected area    Discharge or pus draining from the area    Fever of 100.4 F (38 C) or higher, or as directed by your healthcare provider    Pain that gets worse in or around the  infected     Redness that gets worse in or around the infected area, particularly if the area of redness expands to a wider area    Shaking chills    Swelling of the infected area    Vomiting   Date Last Reviewed: 8/1/2016 2000-2017 [employer]. [address] [place] [address], [place] [address]. All rights reserved. This information is not intended as a substitute for professional medical care. Always follow your healthcare professional's instructions.

## 2017-11-16 NOTE — PROGRESS NOTES
Interventional Radiology Brief Post Procedure Note    Procedure: IR JEJUNOSTOMY TUBE CHANGE    Proceduralist: Clarence Jain MD, Mahamed Wood MD    Assistant: Sourav Woodard MD    Time Out: Prior to the start of the procedure and with procedural staff participation, I verbally confirmed the patient s identity using two indicators, relevant allergies, that the procedure was appropriate and matched the consent or emergent situation, and that the correct equipment/implants were available. Immediately prior to starting the procedure I conducted the Time Out with the procedural staff and re-confirmed the patient s name, procedure, and site/side. (The Joint Commission universal protocol was followed.)  Yes    Medications   Medication Event Details Admin User Admin Time   fentaNYL (PF) (SUBLIMAZE) injection 25-50 mcg Medication Given Dose: 50 mcg; Route: Intravenous Dulce Carmen RN 11/16/2017  4:48 PM   midazolam (VERSED) injection 0.5-1 mg Medication Given Dose: 1 mg; Route: Intravenous Dulce Carmen RN 11/16/2017  4:48 PM   fentaNYL (PF) (SUBLIMAZE) injection 25-50 mcg Medication Given Dose: 50 mcg; Route: Intravenous Dulce Carmen RN 11/16/2017  4:59 PM   midazolam (VERSED) injection 0.5-1 mg Medication Given Dose: 1 mg; Route: Intravenous Dulce Carmen RN 11/16/2017  4:59 PM   fentaNYL (PF) (SUBLIMAZE) injection 25-50 mcg Medication Given Dose: 50 mcg; Route: Intravenous Dulce Carmen RN 11/16/2017  5:06 PM   midazolam (VERSED) injection 0.5-1 mg Medication Given Dose: 1 mg; Route: Intravenous Dulce Carmen RN 11/16/2017  5:06 PM   lidocaine 1 % 1-30 mL Medication Given by Other Dose: 5 mL; Route: Intradermal Dulce Carmen RN 11/16/2017  5:15 PM   lidocaine 2 % (URO-JET) jelly 5 mL Medication Given by Other Dose: 5 mL; Route: Topical; Scheduled Time:  5:15 PM Dulce Carmen RN 11/16/2017  5:18 PM       Sedation: IR Nurse Monitored  Care   Post Procedure Summary:  Prior to the start of the procedure and with procedural staff participation, I verbally confirmed the patient s identity using two indicators, relevant allergies, that the procedure was appropriate and matched the consent or emergent situation, and that the correct equipment/implants were available. Immediately prior to starting the procedure I conducted the Time Out with the procedural staff and re-confirmed the patient s name, procedure, and site/side. (The Joint Commission universal protocol was followed.)  Yes       Sedatives: Fentanyl and Midazolam (Versed)    Vital signs, airway and pulse oximetry were monitored and remained stable throughout the procedure and sedation was maintained until the procedure was complete.  The patient was monitored by staff until sedation discharge criteria were met.    Patient tolerance: Patient tolerated the procedure well with no immediate complications.    Time of sedation in minutes: 15 Minutes minutes from beginning to end of physician one to one monitoring.          Findings: Routine exchange of ojeda catheter for jejunostomy tube.    Estimated Blood Loss: None    Fluoroscopy Time:  minute(s)    SPECIMENS: None    Complications: 1. None     Condition: Stable    Plan:   -to 2A for post procedural observation for 1 hour (for sedation)  -Resume prior tube cares, feeds.     Comments: See dictated procedure note for full details.    Sourav Woodard MD

## 2017-11-16 NOTE — PROGRESS NOTES
Pt received from IR with RN from J tube replacement. Tube site is dry, tube capped. Skin around area is reddened and dry; before tube change the occlusive drsg was leaking bile.  at bedside. Sleepy but arouses to voice.

## 2017-11-16 NOTE — PROGRESS NOTES
Interventional Radiology Pre-Procedure Sedation Assessment   Time of Assessment: 4:01 PM    Expected Level: Moderate Sedation    Indication: Sedation is required for the following type of Procedure: GI    Sedation and procedural consent: Risks, benefits and alternatives were discussed with Patient    PO Intake: Appropriately NPO for procedure    ASA Class: Class 2 - MILD SYSTEMIC DISEASE, NO ACUTE PROBLEMS, NO FUNCTIONAL LIMITATIONS.    Mallampati: Grade 1:  Soft palate, uvula, tonsillar pillars, and posterior pharyngeal wall visible    Lungs: Lungs Clear with good breath sounds bilaterally    Heart: Normal heart sounds and rate    History and physical reviewed and no updates needed. I have reviewed the lab findings, diagnostic data, medications, and the plan for sedation. I have determined this patient to be an appropriate candidate for the planned sedation and procedure and have reassessed the patient IMMEDIATELY PRIOR to sedation and procedure.    Sourav Woodard MD

## 2017-11-17 ENCOUNTER — APPOINTMENT (OUTPATIENT)
Dept: INTERVENTIONAL RADIOLOGY/VASCULAR | Facility: CLINIC | Age: 72
End: 2017-11-17
Attending: PHYSICIAN ASSISTANT
Payer: MEDICARE

## 2017-11-17 ENCOUNTER — HOSPITAL ENCOUNTER (OUTPATIENT)
Facility: CLINIC | Age: 72
Discharge: HOME | End: 2017-11-17
Attending: STUDENT IN AN ORGANIZED HEALTH CARE EDUCATION/TRAINING PROGRAM | Admitting: STUDENT IN AN ORGANIZED HEALTH CARE EDUCATION/TRAINING PROGRAM
Payer: MEDICARE

## 2017-11-17 ENCOUNTER — HOSPITAL ENCOUNTER (OUTPATIENT)
Facility: CLINIC | Age: 72
Discharge: HOME | End: 2017-11-17
Attending: STUDENT IN AN ORGANIZED HEALTH CARE EDUCATION/TRAINING PROGRAM | Admitting: RADIOLOGY
Payer: MEDICARE

## 2017-11-17 ENCOUNTER — SURGERY (OUTPATIENT)
Age: 72
End: 2017-11-17

## 2017-11-17 ENCOUNTER — APPOINTMENT (OUTPATIENT)
Dept: GENERAL RADIOLOGY | Facility: CLINIC | Age: 72
End: 2017-11-17
Attending: STUDENT IN AN ORGANIZED HEALTH CARE EDUCATION/TRAINING PROGRAM
Payer: MEDICARE

## 2017-11-17 ENCOUNTER — ANESTHESIA (OUTPATIENT)
Dept: SURGERY | Facility: CLINIC | Age: 72
End: 2017-11-17
Payer: MEDICARE

## 2017-11-17 VITALS
BODY MASS INDEX: 18.31 KG/M2 | WEIGHT: 93.25 LBS | OXYGEN SATURATION: 100 % | RESPIRATION RATE: 16 BRPM | DIASTOLIC BLOOD PRESSURE: 72 MMHG | SYSTOLIC BLOOD PRESSURE: 125 MMHG | TEMPERATURE: 98 F | HEIGHT: 60 IN

## 2017-11-17 VITALS
OXYGEN SATURATION: 99 % | DIASTOLIC BLOOD PRESSURE: 68 MMHG | SYSTOLIC BLOOD PRESSURE: 118 MMHG | RESPIRATION RATE: 12 BRPM

## 2017-11-17 LAB — GLUCOSE BLDC GLUCOMTR-MCNC: 82 MG/DL (ref 70–99)

## 2017-11-17 PROCEDURE — 25000566 ZZH SEVOFLURANE, EA 15 MIN: Performed by: STUDENT IN AN ORGANIZED HEALTH CARE EDUCATION/TRAINING PROGRAM

## 2017-11-17 PROCEDURE — 71000014 ZZH RECOVERY PHASE 1 LEVEL 2 FIRST HR: Performed by: STUDENT IN AN ORGANIZED HEALTH CARE EDUCATION/TRAINING PROGRAM

## 2017-11-17 PROCEDURE — 82962 GLUCOSE BLOOD TEST: CPT

## 2017-11-17 PROCEDURE — 40000279 XR SURGERY CARM FLUORO GREATER THAN 5 MIN W STILLS: Mod: TC

## 2017-11-17 PROCEDURE — 25000132 ZZH RX MED GY IP 250 OP 250 PS 637: Mod: GY | Performed by: ANESTHESIOLOGY

## 2017-11-17 PROCEDURE — 27210794 ZZH OR GENERAL SUPPLY STERILE: Performed by: STUDENT IN AN ORGANIZED HEALTH CARE EDUCATION/TRAINING PROGRAM

## 2017-11-17 PROCEDURE — 71000027 ZZH RECOVERY PHASE 2 EACH 15 MINS: Performed by: STUDENT IN AN ORGANIZED HEALTH CARE EDUCATION/TRAINING PROGRAM

## 2017-11-17 PROCEDURE — 25000128 H RX IP 250 OP 636: Performed by: NURSE ANESTHETIST, CERTIFIED REGISTERED

## 2017-11-17 PROCEDURE — 99211 OFF/OP EST MAY X REQ PHY/QHP: CPT

## 2017-11-17 PROCEDURE — A9270 NON-COVERED ITEM OR SERVICE: HCPCS | Mod: GY | Performed by: ANESTHESIOLOGY

## 2017-11-17 PROCEDURE — 36000053 ZZH SURGERY LEVEL 2 EA 15 ADDTL MIN - UMMC: Performed by: STUDENT IN AN ORGANIZED HEALTH CARE EDUCATION/TRAINING PROGRAM

## 2017-11-17 PROCEDURE — 36000055 ZZH SURGERY LEVEL 2 W FLUORO 1ST 30 MIN - UMMC: Performed by: STUDENT IN AN ORGANIZED HEALTH CARE EDUCATION/TRAINING PROGRAM

## 2017-11-17 PROCEDURE — 37000008 ZZH ANESTHESIA TECHNICAL FEE, 1ST 30 MIN: Performed by: STUDENT IN AN ORGANIZED HEALTH CARE EDUCATION/TRAINING PROGRAM

## 2017-11-17 PROCEDURE — 27210903 ZZH KIT CR5

## 2017-11-17 PROCEDURE — 40000170 ZZH STATISTIC PRE-PROCEDURE ASSESSMENT II: Performed by: STUDENT IN AN ORGANIZED HEALTH CARE EDUCATION/TRAINING PROGRAM

## 2017-11-17 PROCEDURE — 37000009 ZZH ANESTHESIA TECHNICAL FEE, EACH ADDTL 15 MIN: Performed by: STUDENT IN AN ORGANIZED HEALTH CARE EDUCATION/TRAINING PROGRAM

## 2017-11-17 PROCEDURE — 25000128 H RX IP 250 OP 636: Performed by: ANESTHESIOLOGY

## 2017-11-17 PROCEDURE — C9399 UNCLASSIFIED DRUGS OR BIOLOG: HCPCS | Performed by: NURSE ANESTHETIST, CERTIFIED REGISTERED

## 2017-11-17 PROCEDURE — C1874 STENT, COATED/COV W/DEL SYS: HCPCS | Performed by: STUDENT IN AN ORGANIZED HEALTH CARE EDUCATION/TRAINING PROGRAM

## 2017-11-17 PROCEDURE — 25000125 ZZHC RX 250: Performed by: NURSE ANESTHETIST, CERTIFIED REGISTERED

## 2017-11-17 DEVICE — STENT ESOPHAGEAL ENDOMAXX 23X100MM MAXX-2310
Type: IMPLANTABLE DEVICE | Site: ESOPHAGUS | Status: NON-FUNCTIONAL
Removed: 2018-02-12

## 2017-11-17 RX ORDER — OXYCODONE HCL 5 MG/5 ML
5 SOLUTION, ORAL ORAL EVERY 4 HOURS PRN
Status: DISCONTINUED | OUTPATIENT
Start: 2017-11-17 | End: 2017-11-17 | Stop reason: HOSPADM

## 2017-11-17 RX ORDER — FENTANYL CITRATE 50 UG/ML
25-50 INJECTION, SOLUTION INTRAMUSCULAR; INTRAVENOUS EVERY 5 MIN PRN
Status: DISCONTINUED | OUTPATIENT
Start: 2017-11-17 | End: 2017-11-17 | Stop reason: HOSPADM

## 2017-11-17 RX ORDER — SODIUM CHLORIDE, SODIUM LACTATE, POTASSIUM CHLORIDE, CALCIUM CHLORIDE 600; 310; 30; 20 MG/100ML; MG/100ML; MG/100ML; MG/100ML
INJECTION, SOLUTION INTRAVENOUS CONTINUOUS
Status: DISCONTINUED | OUTPATIENT
Start: 2017-11-17 | End: 2017-11-17 | Stop reason: HOSPADM

## 2017-11-17 RX ORDER — LIDOCAINE HYDROCHLORIDE 20 MG/ML
INJECTION, SOLUTION INFILTRATION; PERINEURAL PRN
Status: DISCONTINUED | OUTPATIENT
Start: 2017-11-17 | End: 2017-11-17

## 2017-11-17 RX ORDER — HYDROMORPHONE HYDROCHLORIDE 1 MG/ML
0.5 INJECTION, SOLUTION INTRAMUSCULAR; INTRAVENOUS; SUBCUTANEOUS ONCE
Status: COMPLETED | OUTPATIENT
Start: 2017-11-17 | End: 2017-11-17

## 2017-11-17 RX ORDER — FENTANYL CITRATE 50 UG/ML
INJECTION, SOLUTION INTRAMUSCULAR; INTRAVENOUS PRN
Status: DISCONTINUED | OUTPATIENT
Start: 2017-11-17 | End: 2017-11-17

## 2017-11-17 RX ORDER — EPHEDRINE SULFATE 50 MG/ML
INJECTION, SOLUTION INTRAMUSCULAR; INTRAVENOUS; SUBCUTANEOUS PRN
Status: DISCONTINUED | OUTPATIENT
Start: 2017-11-17 | End: 2017-11-17

## 2017-11-17 RX ORDER — ONDANSETRON 2 MG/ML
4 INJECTION INTRAMUSCULAR; INTRAVENOUS EVERY 30 MIN PRN
Status: DISCONTINUED | OUTPATIENT
Start: 2017-11-17 | End: 2017-11-17 | Stop reason: HOSPADM

## 2017-11-17 RX ORDER — LIDOCAINE 40 MG/G
CREAM TOPICAL
Status: DISCONTINUED | OUTPATIENT
Start: 2017-11-17 | End: 2017-11-17 | Stop reason: HOSPADM

## 2017-11-17 RX ORDER — PROPOFOL 10 MG/ML
INJECTION, EMULSION INTRAVENOUS PRN
Status: DISCONTINUED | OUTPATIENT
Start: 2017-11-17 | End: 2017-11-17

## 2017-11-17 RX ORDER — ONDANSETRON 2 MG/ML
INJECTION INTRAMUSCULAR; INTRAVENOUS PRN
Status: DISCONTINUED | OUTPATIENT
Start: 2017-11-17 | End: 2017-11-17

## 2017-11-17 RX ORDER — DEXAMETHASONE SODIUM PHOSPHATE 10 MG/ML
INJECTION, SOLUTION INTRAMUSCULAR; INTRAVENOUS PRN
Status: DISCONTINUED | OUTPATIENT
Start: 2017-11-17 | End: 2017-11-17

## 2017-11-17 RX ORDER — HYDROMORPHONE HYDROCHLORIDE 1 MG/ML
.3-.5 INJECTION, SOLUTION INTRAMUSCULAR; INTRAVENOUS; SUBCUTANEOUS EVERY 5 MIN PRN
Status: DISCONTINUED | OUTPATIENT
Start: 2017-11-17 | End: 2017-11-17 | Stop reason: HOSPADM

## 2017-11-17 RX ORDER — ONDANSETRON 4 MG/1
4 TABLET, ORALLY DISINTEGRATING ORAL EVERY 30 MIN PRN
Status: DISCONTINUED | OUTPATIENT
Start: 2017-11-17 | End: 2017-11-17 | Stop reason: HOSPADM

## 2017-11-17 RX ADMIN — SODIUM CHLORIDE, POTASSIUM CHLORIDE, SODIUM LACTATE AND CALCIUM CHLORIDE: 600; 310; 30; 20 INJECTION, SOLUTION INTRAVENOUS at 13:00

## 2017-11-17 RX ADMIN — PHENYLEPHRINE HYDROCHLORIDE 200 MCG: 10 INJECTION, SOLUTION INTRAMUSCULAR; INTRAVENOUS; SUBCUTANEOUS at 13:25

## 2017-11-17 RX ADMIN — FENTANYL CITRATE 50 MCG: 50 INJECTION, SOLUTION INTRAMUSCULAR; INTRAVENOUS at 13:15

## 2017-11-17 RX ADMIN — MIDAZOLAM HYDROCHLORIDE 2 MG: 1 INJECTION, SOLUTION INTRAMUSCULAR; INTRAVENOUS at 13:00

## 2017-11-17 RX ADMIN — HYDROMORPHONE HYDROCHLORIDE 0.5 MG: 1 INJECTION, SOLUTION INTRAMUSCULAR; INTRAVENOUS; SUBCUTANEOUS at 11:05

## 2017-11-17 RX ADMIN — Medication 10 MG: at 13:25

## 2017-11-17 RX ADMIN — FENTANYL CITRATE 25 MCG: 50 INJECTION, SOLUTION INTRAMUSCULAR; INTRAVENOUS at 13:08

## 2017-11-17 RX ADMIN — SUGAMMADEX 100 MG: 100 INJECTION, SOLUTION INTRAVENOUS at 14:09

## 2017-11-17 RX ADMIN — FENTANYL CITRATE 50 MCG: 50 INJECTION, SOLUTION INTRAMUSCULAR; INTRAVENOUS at 14:04

## 2017-11-17 RX ADMIN — LIDOCAINE HYDROCHLORIDE 80 MG: 20 INJECTION, SOLUTION INFILTRATION; PERINEURAL at 13:19

## 2017-11-17 RX ADMIN — FENTANYL CITRATE 50 MCG: 50 INJECTION, SOLUTION INTRAMUSCULAR; INTRAVENOUS at 14:17

## 2017-11-17 RX ADMIN — OXYCODONE HYDROCHLORIDE 5 MG: 5 SOLUTION ORAL at 08:50

## 2017-11-17 RX ADMIN — PHENYLEPHRINE HYDROCHLORIDE 200 MCG: 10 INJECTION, SOLUTION INTRAMUSCULAR; INTRAVENOUS; SUBCUTANEOUS at 13:21

## 2017-11-17 RX ADMIN — HYDROMORPHONE HYDROCHLORIDE 0.3 MG: 1 INJECTION, SOLUTION INTRAMUSCULAR; INTRAVENOUS; SUBCUTANEOUS at 14:50

## 2017-11-17 RX ADMIN — DEXAMETHASONE SODIUM PHOSPHATE 4 MG: 10 INJECTION, SOLUTION INTRAMUSCULAR; INTRAVENOUS at 13:33

## 2017-11-17 RX ADMIN — ONDANSETRON 4 MG: 2 INJECTION INTRAMUSCULAR; INTRAVENOUS at 13:33

## 2017-11-17 RX ADMIN — ROCURONIUM BROMIDE 20 MG: 10 INJECTION INTRAVENOUS at 13:33

## 2017-11-17 RX ADMIN — Medication 100 MG: at 13:21

## 2017-11-17 RX ADMIN — FENTANYL CITRATE 25 MCG: 50 INJECTION, SOLUTION INTRAMUSCULAR; INTRAVENOUS at 13:33

## 2017-11-17 RX ADMIN — PROPOFOL 120 MG: 10 INJECTION, EMULSION INTRAVENOUS at 13:20

## 2017-11-17 RX ADMIN — HYDROMORPHONE HYDROCHLORIDE 0.5 MG: 1 INJECTION, SOLUTION INTRAMUSCULAR; INTRAVENOUS; SUBCUTANEOUS at 10:44

## 2017-11-17 RX ADMIN — ROCURONIUM BROMIDE 10 MG: 10 INJECTION INTRAVENOUS at 13:36

## 2017-11-17 NOTE — ANESTHESIA POSTPROCEDURE EVALUATION
Patient: Minerva Blanco    Procedure(s):  Esophagogastroduodenoscopy, Esophogeal Stent Removal, Esophogeal Stent Placement (23x100 EndoMAXX), Cauterization Of Fistula Tract - Wound Class: II-Clean Contaminated    Diagnosis:Esophageal Perforation  Diagnosis Additional Information: No value filed.    Anesthesia Type:  No value filed.    Note:  Anesthesia Post Evaluation    Patient location during evaluation: PACU  Patient participation: Able to fully participate in evaluation  Level of consciousness: awake and alert  Pain management: adequate  Airway patency: patent  Cardiovascular status: hemodynamically stable  Respiratory status: acceptable  Hydration status: acceptable  PONV: none             Last vitals:  Vitals:    11/17/17 1530 11/17/17 1545 11/17/17 1550   BP: 120/65 120/61    Resp:   16   Temp:   36.8  C (98.2  F)   SpO2: 92% 100%          Electronically Signed By: Anaya Enriquez MD  November 17, 2017  4:02 PM

## 2017-11-17 NOTE — OR NURSING
"Dr. Enriquez contacted for pain medication order for left shoulder pain. Pt reports having recent left shoulder surgery and utilizing shoulder \"too much\" yesterday and now with increased level of pain.   "

## 2017-11-17 NOTE — DISCHARGE INSTRUCTIONS
[facility]  Same-Day Surgery   Adult Discharge Orders & Instructions     For 24 hours after surgery    1. Get plenty of rest.  A responsible adult must stay with you for at least 24 hours after you leave the hospital.   2. Do not drive or use heavy equipment.  If you have weakness or tingling, don't drive or use heavy equipment until this feeling goes away.  3. Do not drink alcohol.  4. Avoid strenuous or risky activities.  Ask for help when climbing stairs.   5. You may feel lightheaded.  IF so, sit for a few minutes before standing.  Have someone help you get up.   6. If you have nausea (feel sick to your stomach): Drink only clear liquids such as apple juice, ginger ale, broth or 7-Up.  Rest may also help.  Be sure to drink enough fluids.  Move to a regular diet as you feel able.  7. You may have a slight fever. Call the doctor if your fever is over 100 F (37.7 C) (taken under the tongue) or lasts longer than 24 hours.  8. You may have a dry mouth, a sore throat, muscle aches or trouble sleeping.  These should go away after 24 hours.  9. Do not make important or legal decisions.   Call your doctor for any of the followin.  Signs of infection (fever, growing tenderness at the surgery site, a large amount of drainage or bleeding, severe pain, foul-smelling drainage, redness, swelling).    2. It has been over 8 to 10 hours since surgery and you are still not able to urinate (pass water).    3.  Headache for over 24 hours.    To contact a doctor, call Dr. Nalini Barreto @ [phone] (clinic) or [phone] (office)   or:        [phone] and ask for the resident on call for   Thoracic Surgery (answered 24 hours a day)      Emergency Department:    [facility]: [phone]       (TTY for hearing impaired: [phone])

## 2017-11-17 NOTE — IP AVS SNAPSHOT
Same Day Surgery [address] [address]    Phone:  [phone]                                       After Visit Summary   11/17/2017    Minerva Blanco    MRN: [id number]           After Visit Summary Signature Page     I have received my discharge instructions, and my questions have been answered. I have discussed any challenges I see with this plan with the nurse or doctor.    ..........................................................................................................................................  Patient/Patient Representative Signature      ..........................................................................................................................................  Patient Representative Print Name and Relationship to Patient    ..................................................               ................................................  Date                                            Time    ..........................................................................................................................................  Reviewed by Signature/Title    ...................................................              ..............................................  Date                                                            Time

## 2017-11-17 NOTE — OR NURSING
Discharge instructions reviewed with patient and spouse. No questions or concerns at this time. All belongings returned to patient.  Wheelchair transportation to the front lobby.

## 2017-11-17 NOTE — ANESTHESIA CARE TRANSFER NOTE
Patient: Minerva Blanco    Procedure(s):  Esophagogastroduodenoscopy, Esophogeal Stent Removal, Esophogeal Stent Placement (23x100 EndoMAXX), Cauterization Of Fistula Tract - Wound Class: II-Clean Contaminated    Diagnosis: Esophageal Perforation  Diagnosis Additional Information: No value filed.    Anesthesia Type:   No value filed.     Note:  Airway :Face Mask  Patient transferred to:PACU  Comments: Pt states comfort at this time, VSS, foll commands, strong. Pt stable upon transfer of care. Handoff Report: Identifed the Patient, Identified the Reponsible Provider, Reviewed the pertinent medical history, Discussed the surgical course, Reviewed Intra-OP anesthesia mangement and issues during anesthesia, Set expectations for post-procedure period and Allowed opportunity for questions and acknowledgement of understanding      Vitals: (Last set prior to Anesthesia Care Transfer)    CRNA VITALS  11/17/2017 1348 - 11/17/2017 1427      11/17/2017             Pulse: 106    SpO2: 97 %    Resp Rate (observed): (!)  1    Resp Rate (set): 10                Electronically Signed By: CLIVE Montgomery CRNA  November 17, 2017  2:27 PM

## 2017-11-17 NOTE — OR NURSING
Pt updated regarding reason for delay in her procedure and expected time for her procedure now, pt and her  verbalized understanding

## 2017-11-17 NOTE — IP AVS SNAPSHOT
MRN:[id number]                      After Visit Summary   11/17/2017    Minerva Blanco    MRN: [id number]           Thank you!     Thank you for choosing [facility] for your care. Our goal is always to provide you with excellent care. Hearing back from our patients is one way we can continue to improve our services. Please take a few minutes to complete the written survey that you may receive in the mail after you visit with us. Thank you!        Patient Information     Date Of Birth          1945        About your hospital stay     You were admitted on:  November 17, 2017 You last received care in the:  Same Day Surgery [facility]    You were discharged on:  November 17, 2017        Reason for your hospital stay       You underwent an EGD and replacement of esophageal stent.                  Who to Call     For medical emergencies, please call 911.  For non-urgent questions about your medical care, please call your primary care provider or clinic, [phone]  For questions related to your surgery, please call your surgery clinic        Attending Provider     Provider Specialty    Nalini Barreto MD Thoracic Diseases       Primary Care Provider Office Phone # Fax #    Andrea Nino [phone]      After Care Instructions     Discharge Instructions       THORACIC SURGERY DISCHARGE INSTRUCTIONS    If your plans upon discharge include prolonged periods of sitting (i.e a lengthy car or plane ride), it is highly beneficial to get up and walk at least once per hour to help prevent swelling and blood clots.     You may remove chest dressing in 48 hours and bandage the site at your own discretion thereafter.  Small amounts of leakage are normal for 2-3 days after removal.  Feel free to call with questions.    You may get incision wet 2 days after operation. Do not submerge, soak, or scrub incision or swim until seen in follow-up.    Stay hydrated. Take over the counter fiber  "(metamucil or benefiber) and stool softeners (Miralax, docusate or senna) if becoming constipated.     Call for fever greater than 101.5, chills, increased size of incision, red skin around incision, vision changes, muscle strength changes, sensation changes, shortness of breath, or other concerns.    No driving while taking narcotic pain medication.    Transition to ibuprofen or tylenol/acetaminophen for pain control. Do not take tylenol/acetaminophen and acetaminophen containing narcotic (e.g., percocet or vicodin) at the same time. If you have known ulcer problems, or kidney trouble (elevated creatinine) do not take the ibuprofen.    In emergencies, call 911    For other Questions or Concerns;   A.) During weekday working hours (Monday through Friday 8am to 4:30pm)   call [phone] and ask to speak to a clinical nurse specialist.     B.) At nights (after 4:30pm), on weekends, or if urgent call [phone] and   tell the  \"I would like to page job code [phone], the thoracic surgery   fellow on call, please.\"    You will follow up in clinic on 11/22/17 as planned with Dr. Wise.                  Your next 10 appointments already scheduled     Nov 22, 2017  8:00 AM CST   (Arrive by 7:45 AM)   Return Visit with Jens Wise MD   [facility] ([facility])    [address] [id number] [phone]            Dec 01, 2017   Procedure with Nalini Barreto MD   [facility], Same Day Surgery (--)    [address] [facility] [id number] [phone]              Further instructions from your care team       [facility]  Same-Day Surgery   Adult Discharge Orders & Instructions     For 24 hours after surgery    1. Get plenty of rest.  A responsible adult must stay with you for at least 24 hours after you leave the hospital.   2. Do not drive or use heavy equipment.  If you have " weakness or tingling, don't drive or use heavy equipment until this feeling goes away.  3. Do not drink alcohol.  4. Avoid strenuous or risky activities.  Ask for help when climbing stairs.   5. You may feel lightheaded.  IF so, sit for a few minutes before standing.  Have someone help you get up.   6. If you have nausea (feel sick to your stomach): Drink only clear liquids such as apple juice, ginger ale, broth or 7-Up.  Rest may also help.  Be sure to drink enough fluids.  Move to a regular diet as you feel able.  7. You may have a slight fever. Call the doctor if your fever is over 100 F (37.7 C) (taken under the tongue) or lasts longer than 24 hours.  8. You may have a dry mouth, a sore throat, muscle aches or trouble sleeping.  These should go away after 24 hours.  9. Do not make important or legal decisions.   Call your doctor for any of the followin.  Signs of infection (fever, growing tenderness at the surgery site, a large amount of drainage or bleeding, severe pain, foul-smelling drainage, redness, swelling).    2. It has been over 8 to 10 hours since surgery and you are still not able to urinate (pass water).    3.  Headache for over 24 hours.    To contact a doctor, call Dr. Nalini Barreto @ [phone] (clinic) or [phone] (office)   or:        [phone] and ask for the resident on call for   Thoracic Surgery (answered 24 hours a day)      Emergency Department:    [facility]: [phone]       (TTY for hearing impaired: [phone])              Pending Results     No orders found from 11/15/2017 to 2017.            Statement of Approval     Ordered          17 1432  I have reviewed and agree with all the recommendations and orders detailed in this document.  EFFECTIVE NOW     Approved and electronically signed by:  Katja Gardner MD             Admission Information     Date & Time Provider Department Dept. Phone    2017 Nalini Barreto MD Same Day Surgery [facility]  [place] [phone]      Your Vitals Were     Blood Pressure Temperature Respirations Height Weight Pulse Oximetry    121/70 98.2  F (36.8  C) (Oral) 16 1.524 m (5') 42.3 kg (93 lb 4.1 oz) 100%    BMI (Body Mass Index)                   18.21 kg/m2           [employer] Information     [employer] gives you secure access to your electronic health record. If you see a primary care provider, you can also send messages to your care team and make appointments. If you have questions, please call your primary care clinic.  If you do not have a primary care provider, please call [phone] and they will assist you.        Care EveryWhere ID     This is your Care EveryWhere ID. This could be used by other organizations to access your [facility] medical records  [id number]        Equal Access to Services     TOM JORGENSEN : Dora Meng, isis romero, katharina anderson, florentin saldivar. So [facility] [phone].    ATENCIÓN: Si habla español, tiene a jackson disposición servicios gratuitos de asistencia lingüística. Sreekanth al [phone].    We comply with applicable federal civil rights laws and [place] laws. We do not discriminate on the basis of race, color, national origin, age, disability, sex, sexual orientation, or gender identity.               Review of your medicines      CONTINUE these medicines which may have CHANGED, or have new prescriptions. If we are uncertain of the size of tablets/capsules you have at home, strength may be listed as something that might have changed.        Dose / Directions    loperamide 1 MG/5ML liquid   Commonly known as:  IMODIUM   This may have changed:  when to take this   Used for:  Bowel habit changes        Dose:  4 mg   Take 20 mLs (4 mg) by mouth 4 times daily as needed for diarrhea   Quantity:  200 mL   Refills:  0       pantoprazole 40 MG EC tablet   Commonly known as:  PROTONIX   This may have changed:    - how much to take  - how to  take this  - when to take this  - additional instructions   Used for:  Gastroesophageal reflux disease, esophagitis presence not specified        Take by mouth 30-60 minutes before a meal.   Quantity:  30 tablet   Refills:  0       traZODone 100 MG tablet   Commonly known as:  DESYREL   This may have changed:  how much to take   Used for:  Insomnia, unspecified type        Dose:  50 mg   0.5 tablets (50 mg) by Per G Tube route At Bedtime   Quantity:  30 tablet   Refills:  0         CONTINUE these medicines which have NOT CHANGED        Dose / Directions    acetaminophen 32 mg/mL solution   Commonly known as:  TYLENOL   Indication:  Pain   Used for:  Esophageal perforation        Dose:  975 mg   30.45 mLs (975 mg) by Per Feeding Tube route 3 times daily as needed   Quantity:  300 mL   Refills:  3       BENADRYL PO        Dose:  25 mg   Take 25 mg by mouth nightly as needed   Refills:  0       cephalexin 250 MG/5ML suspension   Commonly known as:  KEFLEX        Dose:  500 mg   10 mLs (500 mg) by Per J Tube route 3 times daily for 5 days   Quantity:  150 mL   Refills:  0       cholestyramine 4 G Packet   Commonly known as:  QUESTRAN        Dose:  1 packet   Take 1 packet by mouth daily   Refills:  0       CULTURELLE PO        Dose:  1 tablet   Take 1 tablet by mouth 2 times daily   Refills:  0       diphenoxylate-atropine 2.5-0.025 MG per tablet   Commonly known as:  LOMOTIL   Used for:  Esophageal anastomotic leak        Dose:  1 tablet   Take 1 tablet by mouth 3 times daily as needed   Quantity:  40 tablet   Refills:  1       ferrous sulfate 300 (60 FE) MG/5ML syrup   Used for:  Esophageal anastomotic leak        Dose:  300 mg   5 mLs (300 mg) by Per J Tube route daily   Quantity:  150 mL   Refills:  3       fiber modular packet   Used for:  History of esophagectomy        Dose:  1 packet   1 packet by Per Feeding Tube route 3 times daily (with meals)   Quantity:  60 packet   Refills:  0       hydrOXYzine 25 MG  capsule   Commonly known as:  VISTARIL        Dose:  25 mg   Take 25 mg by mouth as needed   Refills:  0       MELATONIN PO        Dose:  5 mg   Take 5 mg by mouth At Bedtime   Refills:  0       multivitamins with minerals Liqd liquid        Dose:  15 mL   Take 15 mLs by mouth daily   Refills:  0       * order for DME   Used for:  Hx of esophagectomy        Equipment being ordered:  [facility] Suction Machine-Intermittent Suction Canisters(2) Suction Canister Holders(2) Suction Connect Tube(2) 5 in 1 Connector(2) Bacteria Filter(2) Yaunkauer Suction(2)  Treatment Diagnosis: Esophogeal Perforation, s/p esophagectomy   Quantity:  1 Device   Refills:  0       * order for DME   Used for:  Esophageal perforation        Equipment being ordered:  Suction Pump Suction Canister(2) Suction Tubing(2) Bacteria Filter(2) Yaunkauer(4) Red Rubber Catheter(2)  Treatment Diagnosis: Esophogeal Perforation, s/p esophagectomy   Quantity:  1 Device   Refills:  0       oxyCODONE IR 5 MG tablet   Commonly known as:  ROXICODONE   Used for:  Esophageal anastomotic leak        Dose:  5 mg   Take 1 tablet (5 mg) by mouth See Admin Instructions One tablet every 4-6 hours as needed for pain   Quantity:  84 tablet   Refills:  0       TRAMADOL HCL PO        Dose:  25 mg   Take 25 mg by mouth every 6 hours as needed for moderate to severe pain   Refills:  0       UNABLE TO FIND        2 nell supplement 4 cans daily per g tube   Refills:  0       * Notice:  This list has 2 medication(s) that are the same as other medications prescribed for you. Read the directions carefully, and ask your doctor or other care provider to review them with you.             Protect others around you: Learn how to safely use, store and throw away your medicines at www.disposemymeds.org.             Medication List: This is a list of all your medications and when to take them. Check marks below indicate your daily home schedule. Keep this list as a reference.      Medications            Morning Afternoon Evening Bedtime As Needed    acetaminophen 32 mg/mL solution   Commonly known as:  TYLENOL   30.45 mLs (975 mg) by Per Feeding Tube route 3 times daily as needed                                BENADRYL PO   Take 25 mg by mouth nightly as needed                                cephalexin 250 MG/5ML suspension   Commonly known as:  KEFLEX   10 mLs (500 mg) by Per J Tube route 3 times daily for 5 days                                cholestyramine 4 G Packet   Commonly known as:  QUESTRAN   Take 1 packet by mouth daily                                CULTURELLE PO   Take 1 tablet by mouth 2 times daily                                diphenoxylate-atropine 2.5-0.025 MG per tablet   Commonly known as:  LOMOTIL   Take 1 tablet by mouth 3 times daily as needed                                ferrous sulfate 300 (60 FE) MG/5ML syrup   5 mLs (300 mg) by Per J Tube route daily                                fiber modular packet   1 packet by Per Feeding Tube route 3 times daily (with meals)                                hydrOXYzine 25 MG capsule   Commonly known as:  VISTARIL   Take 25 mg by mouth as needed                                loperamide 1 MG/5ML liquid   Commonly known as:  IMODIUM   Take 20 mLs (4 mg) by mouth 4 times daily as needed for diarrhea                                MELATONIN PO   Take 5 mg by mouth At Bedtime                                multivitamins with minerals Liqd liquid   Take 15 mLs by mouth daily                                * order for DME   Equipment being ordered:  [facility] Suction Machine-Intermittent Suction Canisters(2) Suction Canister Holders(2) Suction Connect Tube(2) 5 in 1 Connector(2) Bacteria Filter(2) Yaunkauer Suction(2)  Treatment Diagnosis: Esophogeal Perforation, s/p esophagectomy                                * order for DME   Equipment being ordered:  Suction Pump Suction Canister(2) Suction Tubing(2) Bacteria Filter(2) Yaunkauer(4) Red Rubber  Catheter(2)  Treatment Diagnosis: Esophogeal Perforation, s/p esophagectomy                                oxyCODONE IR 5 MG tablet   Commonly known as:  ROXICODONE   Take 1 tablet (5 mg) by mouth See Admin Instructions One tablet every 4-6 hours as needed for pain                                pantoprazole 40 MG EC tablet   Commonly known as:  PROTONIX   Take by mouth 30-60 minutes before a meal.                                TRAMADOL HCL PO   Take 25 mg by mouth every 6 hours as needed for moderate to severe pain                                traZODone 100 MG tablet   Commonly known as:  DESYREL   0.5 tablets (50 mg) by Per G Tube route At Bedtime                                UNABLE TO FIND   2 nell supplement 4 cans daily per g tube                                * Notice:  This list has 2 medication(s) that are the same as other medications prescribed for you. Read the directions carefully, and ask your doctor or other care provider to review them with you.

## 2017-11-17 NOTE — OP NOTE
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       PROCEDURES:   1.  Endoscopy with removal of esophageal stent and cauterization of fistulous tract.   2.  Replacement of esophageal stent (23 x 100).   3.  Cauterization of chest wall wound.       SURGEON:  Nalini Barreto MD          ANESTHESIA:  General.       ESTIMATED BLOOD LOSS:  Zero.       FINDINGS:  There is still a fistulous tract that we cauterized in its entirety both from the endoscopic side as well as from the chest wound side.         DESCRIPTION OF PROCEDURE:  With Minerva Santoro in the supine position under general anesthesia, we performed an endoscopy and removed the previous esophageal stent with the rat-tooth forceps without difficulty.  Following this, we identified the tract and then cauterized it extensively endoscopically with the gold probe.  We also cauterized it extensively with the cautery on the skin.  We then placed a wire and under fluoroscopic guidance, replaced the stents. We used a 23X100 mm stent to ensure adequate coverage of the fistula.

## 2017-11-17 NOTE — BRIEF OP NOTE
[facility], [place]    Brief Operative Note    Pre-operative diagnosis: Esophageal Perforation  Post-operative diagnosis * No post-op diagnosis entered *  Procedure: Procedure(s):  Esophagogastroduodenoscopy, Esophogeal Stent Removal, Esophogeal Stent Placement (23x100 EndoMAXX), Cauterization Of Fistula Tract - Wound Class: II-Clean Contaminated  Surgeon: Surgeon(s) and Role:     * Nalini Barreto MD - Primary  *Katja Gardner MD - Resident, Assisting    Anesthesia: General   Estimated blood loss: Minimal  Drains: None  Specimens: * No specimens in log *  Findings:   Expected esophageal cutaneous fistula.  Complications: None.  Implants: None.

## 2017-11-17 NOTE — ANESTHESIA PREPROCEDURE EVALUATION
Anesthesia Evaluation     .             ROS/MED HX    ENT/Pulmonary:     (+), recent URI . .    Neurologic:     (+)delerium Multiple Sclerosis     Cardiovascular:         METS/Exercise Tolerance:     Hematologic:         Musculoskeletal:         GI/Hepatic:     (+) GERD hiatal hernia,       Renal/Genitourinary:         Endo:         Psychiatric:         Infectious Disease:         Malignancy:         Other:                   Anesthesia Attending Preoperative Note    HPI: Minerva Blanco is a 72 year old female who  has a past medical history of Arrhythmia; Atrial fibrillation (H); Breast cancer (H) (2007); Chronic infection; Esophageal perforation; Fibromyalgia; GERD (gastroesophageal reflux disease); Hiatal hernia; History of blood transfusion; IBS (irritable bowel syndrome); Meniere's disease; MS (multiple sclerosis) (H); Multiple sclerosis (H); Noninfectious ileitis; and Other chronic pain.  has a past surgical history that includes Esophagoscopy, gastroscopy, duodenoscopy (EGD), combined (N/A, 4/18/2016); Pharyngostomy (N/A, 4/18/2016); Esophagoscopy, gastroscopy, duodenoscopy (EGD), combined (N/A, 4/25/2016); Pharyngostomy (N/A, 4/25/2016); appendectomy; Thoracotomy (Right, 1998); back surgery (5/1/2015); Wrist surgery; Nissen fundoplication (1/2016); GASTROSTOMY/JEJUN TUBE; Esophagoscopy, gastroscopy, duodenoscopy (EGD), combined (N/A, 5/4/2016); Pharyngostomy (N/A, 5/4/2016); Esophagoscopy, gastroscopy, duodenoscopy (EGD), combined (N/A, 5/18/2016); Esophagoscopy, gastroscopy, duodenoscopy (EGD), combined (N/A, 6/22/2016); Pharyngostomy (N/A, 6/22/2016); Esophagoscopy, gastroscopy, duodenoscopy (EGD), dilatation, combined (N/A, 6/29/2016); Irrigation and debridement chest washout, combined (N/A, 6/29/2016); Pharyngostomy (N/A, 6/29/2016); UGI ENDOSCOPY W TRANSENDOSCOPIC STENT PLACEMENT (N/A, 7/12/2016); Esophagoscopy, gastroscopy, duodenoscopy (EGD), combined (N/A, 7/12/2016); UGI ENDOSCOPY W  TRANSENDOSCOPIC STENT PLACEMENT (N/A, 7/22/2016); picc insertion (Left, 8/25/2016); Combined Esophagoscopy, Gastroscopy, Duodenoscopy (EGD), Remove Esophageal Stent (N/A, 8/26/2016); Nerve block peripheral (N/A, 8/30/2016); Lumpectomy breast (Right, 2007); Laparoscopy diagnostic (general) (N/A, 1/9/2017); Thoracotomy (Left, 1/9/2017); Esophagectomy (N/A, 1/9/2017); Create spit fistula (N/A, 1/9/2017); Laparoscopy diagnostic (general) (N/A, 4/17/2017); Close spit fistula (N/A, 4/17/2017); Laparoscopic assisted insertion tube jejunostomy (N/A, 4/17/2017); Bronchoscopy flexible (N/A, 4/17/2017); Esophagoscopy, gastroscopy, duodenoscopy (EGD), combined (N/A, 4/21/2017); Pharyngostomy (N/A, 4/21/2017); Esophagoscopy, gastroscopy, duodenoscopy (EGD), combined (N/A, 5/19/2017); Explore neck (N/A, 5/19/2017); Irrigation and debridement chest washout, combined (N/A, 5/23/2017); Esophagoscopy, gastroscopy, duodenoscopy (EGD), combined (N/A, 5/23/2017); Irrigation and debridement chest washout, combined (N/A, 5/24/2017); Irrigation and debridement chest washout, combined (N/A, 5/25/2017); Irrigation and debridement chest washout, combined (N/A, 5/26/2017); Incision and drainage chest washout, combined (N/A, 5/27/2017); Incision and drainage chest washout, combined (N/A, 5/28/2017); Irrigation and debridement chest washout, combined (N/A, 5/30/2017); PICC INSERTION - Rewire (Right, 05/31/2017); Irrigation and debridement chest washout, combined (N/A, 5/31/2017); Pharyngostomy (Left, 5/31/2017); Irrigation and debridement chest washout, combined (N/A, 6/1/2017); Irrigation and debridement chest washout, combined (N/A, 6/4/2017); Irrigation and debridement chest washout, combined (N/A, 6/2/2017); Incision and drainage chest washout, combined (N/A, 6/9/2017); Esophagoscopy, gastroscopy, duodenoscopy (EGD), combined (N/A, 6/27/2017); Incision and drainage chest washout, combined (N/A, 7/17/2017); Esophagoscopy, gastroscopy,  duodenoscopy (EGD), combined (N/A, 8/4/2017); Esophagoscopy, gastroscopy, duodenoscopy (EGD), combined (N/A, 8/25/2017); Combined Esophagoscopy, Gastroscopy, Duodenoscopy (EGD), Replace Esophageal Stent (N/A, 8/25/2017); Combined Esophagoscopy, Gastroscopy, Duodenoscopy (EGD), Replace Esophageal Stent (N/A, 9/15/2017); Repair fistula tracheoesophageal (N/A, 9/15/2017); Combined Esophagoscopy, Gastroscopy, Duodenoscopy (EGD), Replace Esophageal Stent (N/A, 10/20/2017); Esophagoscopy, gastroscopy, duodenoscopy (EGD), combined (N/A, 10/2/2017); and Combined Esophagoscopy, Gastroscopy, Duodenoscopy (EGD), Replace Esophageal Stent (N/A, 11/3/2017). with a Esophageal Perforation who is scheduled for Procedure(s):  Esophagogastroduodenoscopy, Stent Revision, Cauterization Of Tracheoesophageal Fistula - Wound Class: II-Clean Contaminated.      PMHx/PSHx/ROS:  Past Medical History:   Diagnosis Date     Arrhythmia     one time event; no longer on meds     Atrial fibrillation (H)      Breast cancer (H) 2007    right side - lumpectomy, chemo, and local radiation     Chronic infection     infection/wound for two years after esoph surgery     Esophageal perforation      Fibromyalgia      GERD (gastroesophageal reflux disease)      Hiatal hernia      History of blood transfusion      IBS (irritable bowel syndrome)      Meniere's disease     deaf left ear     MS (multiple sclerosis) (H)      Multiple sclerosis (H)      Noninfectious ileitis      Other chronic pain        Past Surgical History:   Procedure Laterality Date     APPENDECTOMY       BACK SURGERY  5/1/2015    lumbar fusion     BRONCHOSCOPY FLEXIBLE N/A 4/17/2017    Procedure: BRONCHOSCOPY FLEXIBLE;;  Surgeon: Jens Wise MD;  [facility]     C GASTROSTOMY/JEJUN TUBE      J tube for feedings     CLOSE SPIT FISTULA N/A 4/17/2017    Procedure: CLOSE SPIT FISTULA;;  Surgeon: Jens Wise MD;  [facility]     COMBINED ESOPHAGOSCOPY,  GASTROSCOPY, DUODENOSCOPY (EGD), REMOVE ESOPHAGEAL STENT N/A 8/26/2016    Procedure: COMBINED ESOPHAGOSCOPY, GASTROSCOPY, DUODENOSCOPY (EGD), REMOVE ESOPHAGEAL STENT;  Surgeon: Jens Wise MD;  [facility]     COMBINED ESOPHAGOSCOPY, GASTROSCOPY, DUODENOSCOPY (EGD), REPLACE ESOPHAGEAL STENT N/A 8/25/2017    Procedure: COMBINED ESOPHAGOSCOPY, GASTROSCOPY, DUODENOSCOPY (EGD), REPLACE ESOPHAGEAL STENT;;  Surgeon: Jens Wise MD;  [facility]     COMBINED ESOPHAGOSCOPY, GASTROSCOPY, DUODENOSCOPY (EGD), REPLACE ESOPHAGEAL STENT N/A 9/15/2017    Procedure: COMBINED ESOPHAGOSCOPY, GASTROSCOPY, DUODENOSCOPY (EGD), REPLACE ESOPHAGEAL STENT;  Upper Endoscopy with Stent Exchange, Cauterization of Tracheosophageal Fistula, Cauterization of Chest Wound   ;  Surgeon: Jens Wise MD;  [facility]     COMBINED ESOPHAGOSCOPY, GASTROSCOPY, DUODENOSCOPY (EGD), REPLACE ESOPHAGEAL STENT N/A 10/20/2017    Procedure: COMBINED ESOPHAGOSCOPY, GASTROSCOPY, DUODENOSCOPY (EGD), REPLACE ESOPHAGEAL STENT;  Upper Endoscopy with Stent Exchange, Cauterization Tracheosphageal Fistula Tract;  Surgeon: Nalini Barreto MD;  [facility]     COMBINED ESOPHAGOSCOPY, GASTROSCOPY, DUODENOSCOPY (EGD), REPLACE ESOPHAGEAL STENT N/A 11/3/2017    Procedure: COMBINED ESOPHAGOSCOPY, GASTROSCOPY, DUODENOSCOPY (EGD), REPLACE ESOPHAGEAL STENT;  Esophagogastroduodenoscopy, Stent Revision, Cauterization Of Traceoesophageal Fistula and stent exchange;  Surgeon: Nalini Barreto MD;  [facility]     CREATE SPIT FISTULA N/A 1/9/2017    Procedure: CREATE SPIT FISTULA;  Surgeon: Jens Wise MD;  [facility]     ESOPHAGECTOMY N/A 1/9/2017    Procedure: ESOPHAGECTOMY;  Surgeon: Jens Wise MD;  [facility]     ESOPHAGOSCOPY, GASTROSCOPY, DUODENOSCOPY (EGD), COMBINED N/A 4/18/2016    Procedure: COMBINED ESOPHAGOSCOPY, GASTROSCOPY, DUODENOSCOPY (EGD);  Surgeon: Linda Bravo,  MD Alexis;  [facility]     ESOPHAGOSCOPY, GASTROSCOPY, DUODENOSCOPY (EGD), COMBINED N/A 4/25/2016    Procedure: COMBINED ESOPHAGOSCOPY, GASTROSCOPY, DUODENOSCOPY (EGD);  Surgeon: Alexis Barraza MD;  [facility]     ESOPHAGOSCOPY, GASTROSCOPY, DUODENOSCOPY (EGD), COMBINED N/A 5/4/2016    Procedure: COMBINED ESOPHAGOSCOPY, GASTROSCOPY, DUODENOSCOPY (EGD);  Surgeon: Alexis Barraza MD;  [facility]     ESOPHAGOSCOPY, GASTROSCOPY, DUODENOSCOPY (EGD), COMBINED N/A 5/18/2016    Procedure: COMBINED ESOPHAGOSCOPY, GASTROSCOPY, DUODENOSCOPY (EGD);  Surgeon: Alexis Barraza MD;  [facility]     ESOPHAGOSCOPY, GASTROSCOPY, DUODENOSCOPY (EGD), COMBINED N/A 6/22/2016    Procedure: COMBINED ESOPHAGOSCOPY, GASTROSCOPY, DUODENOSCOPY (EGD);  Surgeon: Alexis Barraza MD;  [facility]     ESOPHAGOSCOPY, GASTROSCOPY, DUODENOSCOPY (EGD), COMBINED N/A 7/12/2016    Procedure: COMBINED ESOPHAGOSCOPY, GASTROSCOPY, DUODENOSCOPY (EGD);  Surgeon: Jens Wise MD;  [facility]     ESOPHAGOSCOPY, GASTROSCOPY, DUODENOSCOPY (EGD), COMBINED N/A 4/21/2017    Procedure: COMBINED ESOPHAGOSCOPY, GASTROSCOPY, DUODENOSCOPY (EGD);  Esophagogastroduodenoscopy, Pharyngostomy Tube Placement ;  Surgeon: Jens Wise MD;  [facility]     ESOPHAGOSCOPY, GASTROSCOPY, DUODENOSCOPY (EGD), COMBINED N/A 5/19/2017    Procedure: COMBINED ESOPHAGOSCOPY, GASTROSCOPY, DUODENOSCOPY (EGD);  Upper Endoscopy, Irrigation and Debridement of Chest Wound ;  Surgeon: Nalini Barreto MD;  [facility]     ESOPHAGOSCOPY, GASTROSCOPY, DUODENOSCOPY (EGD), COMBINED N/A 5/23/2017    Procedure: COMBINED ESOPHAGOSCOPY, GASTROSCOPY, DUODENOSCOPY (EGD);;  Surgeon: Nalini Barreto MD;  [facility]     ESOPHAGOSCOPY, GASTROSCOPY, DUODENOSCOPY (EGD), COMBINED N/A 6/27/2017    Procedure: COMBINED ESOPHAGOSCOPY, GASTROSCOPY, DUODENOSCOPY (EGD);  Esophagogastroduodenoscopy With Removal And  Replacement Of Esophageal Stent exchange. Exchange of pharyngtomy tube. Chest wound dressing change;  Surgeon: Nalini Barreto MD;  [facility]     ESOPHAGOSCOPY, GASTROSCOPY, DUODENOSCOPY (EGD), COMBINED N/A 8/4/2017    Procedure: COMBINED ESOPHAGOSCOPY, GASTROSCOPY, DUODENOSCOPY (EGD);  Esophagogastroduodenoscopy, Stent Removal and Stent Replacement. Dressing Change ;  Surgeon: Nalini Barreto MD;  [facility]     ESOPHAGOSCOPY, GASTROSCOPY, DUODENOSCOPY (EGD), COMBINED N/A 8/25/2017    Procedure: COMBINED ESOPHAGOSCOPY, GASTROSCOPY, DUODENOSCOPY (EGD);  Esophagogastroduodenoscopy,  Stent Revision,  Cauterization;  Surgeon: Jens Wise MD;  [facility]     ESOPHAGOSCOPY, GASTROSCOPY, DUODENOSCOPY (EGD), COMBINED N/A 10/2/2017    Procedure: COMBINED ESOPHAGOSCOPY, GASTROSCOPY, DUODENOSCOPY (EGD);  Esophagogastroduodenostomy, Replacement of Esophageal Stent, Cauterization of Tracheosophageal Fistula anc chest wall,   C-arm;  Surgeon: Nalini Barreto MD;  [facility]     ESOPHAGOSCOPY, GASTROSCOPY, DUODENOSCOPY (EGD), DILATATION, COMBINED N/A 6/29/2016    Procedure: COMBINED ESOPHAGOSCOPY, GASTROSCOPY, DUODENOSCOPY (EGD), DILATATION;  Surgeon: Jens Wise MD;  [facility]     EXPLORE NECK N/A 5/19/2017    Procedure: EXPLORE NECK;;  Surgeon: Nalini Barreto MD;  [facility]     HC UGI ENDOSCOPY W TRANSENDOSCOPIC STENT PLACEMENT N/A 7/12/2016    Procedure: COMBINED ESOPHAGOSCOPY, GASTROSCOPY, DUODENOSCOPY (EGD), PLACE TRANSENDOSCOPIC ESOPHAGEAL STENT;  Surgeon: Jens Wise MD;  [facility]     HC UGI ENDOSCOPY W TRANSENDOSCOPIC STENT PLACEMENT N/A 7/22/2016    Procedure: COMBINED ESOPHAGOSCOPY, GASTROSCOPY, DUODENOSCOPY (EGD), PLACE TRANSENDOSCOPIC ESOPHAGEAL STENT;  Surgeon: Jens Wise MD;  [facility]     INCISION AND DRAINAGE CHEST WASHOUT, COMBINED N/A 5/27/2017    Procedure: COMBINED INCISION AND DRAINAGE CHEST WASHOUT;  Chest  washout;  Surgeon: Nalini Barreto MD;  [facility]     INCISION AND DRAINAGE CHEST WASHOUT, COMBINED N/A 5/28/2017    Procedure: COMBINED INCISION AND DRAINAGE CHEST WASHOUT;  Chest washout;  Surgeon: Nalini Barreto MD;  [facility]     INCISION AND DRAINAGE CHEST WASHOUT, COMBINED N/A 6/9/2017    Procedure: COMBINED INCISION AND DRAINAGE CHEST WASHOUT;  Chest washout and dressing change, Esophaogastroduodenoscopy;  Surgeon: Jens Wise MD;  [facility]     INCISION AND DRAINAGE CHEST WASHOUT, COMBINED N/A 7/17/2017    Procedure: COMBINED INCISION AND DRAINAGE CHEST WASHOUT;  Incision and Drainage of right Chest Wound, Esophagogastroduodenoscopy with stent exchange. pharangostomy tube revision;  Surgeon: Jens Wise MD;  [facility]     IRRIGATION AND DEBRIDEMENT CHEST WASHOUT, COMBINED N/A 6/29/2016    Procedure: COMBINED IRRIGATION AND DEBRIDEMENT CHEST WASHOUT;  Surgeon: Jens Wise MD;  [facility]     IRRIGATION AND DEBRIDEMENT CHEST WASHOUT, COMBINED N/A 5/23/2017    Procedure: COMBINED IRRIGATION AND DEBRIDEMENT CHEST WASHOUT;  COMBINED IRRIGATION AND DEBRIDEMENT CHEST WASHOUT,COMBINED ESOPHAGOSCOPY, GASTROSCOPY, DUODENOSCOPY (EGD) ;  Surgeon: Nalini Barreto MD;  [facility]     IRRIGATION AND DEBRIDEMENT CHEST WASHOUT, COMBINED N/A 5/24/2017    Procedure: COMBINED IRRIGATION AND DEBRIDEMENT CHEST WASHOUT;  Chest wound Washout and Dressing Change;  Surgeon: Nalini Barreto MD;  [facility]     IRRIGATION AND DEBRIDEMENT CHEST WASHOUT, COMBINED N/A 5/25/2017    Procedure: COMBINED IRRIGATION AND DEBRIDEMENT CHEST WASHOUT;  Irrigation and Debridement Chest Washout and dressing change;  Surgeon: Nalini Barreto MD;  [facility]     IRRIGATION AND DEBRIDEMENT CHEST WASHOUT, COMBINED N/A 5/26/2017    Procedure: COMBINED IRRIGATION AND DEBRIDEMENT CHEST WASHOUT;  Irrigation and Debridement Chest Washout with  dressing change;  Surgeon:  Naliin Barreto MD;  [facility]     IRRIGATION AND DEBRIDEMENT CHEST WASHOUT, COMBINED N/A 5/30/2017    Procedure: COMBINED IRRIGATION AND DEBRIDEMENT CHEST WASHOUT;  Irrigation and Debridement Chest Washout , PICC line dressing change;  Surgeon: Nalini Barreto MD;  [facility]     IRRIGATION AND DEBRIDEMENT CHEST WASHOUT, COMBINED N/A 5/31/2017    Procedure: COMBINED IRRIGATION AND DEBRIDEMENT CHEST WASHOUT;  Irrigation and Debridement Chest Washout ;  Surgeon: Nalini Barreto MD;  [facility]     IRRIGATION AND DEBRIDEMENT CHEST WASHOUT, COMBINED N/A 6/1/2017    Procedure: COMBINED IRRIGATION AND DEBRIDEMENT CHEST WASHOUT;  Chest Wound Irrigation And Debridement with dressing change ;  Surgeon: Nalini Barreto MD;  [facility]     IRRIGATION AND DEBRIDEMENT CHEST WASHOUT, COMBINED N/A 6/4/2017    Procedure: COMBINED IRRIGATION AND DEBRIDEMENT CHEST WASHOUT;  COMBINED IRRIGATION AND DEBRIDEMENT CHEST WASHOUT, Esophagoscopy, Gastroscopy, Duodenoscopy (EGD) place transendoscopic esophageal stent,   Pharyngostomy and chest wall tube exchange  C-Arm;  Surgeon: Jens Wise MD;  [facility]     IRRIGATION AND DEBRIDEMENT CHEST WASHOUT, COMBINED N/A 6/2/2017    Procedure: COMBINED IRRIGATION AND DEBRIDEMENT CHEST WASHOUT;  Chest Wound Irrigation and Debridement, Dressing Change;  Surgeon: Nalini Barreto MD;  [facility]     LAPAROSCOPIC ASSISTED INSERTION TUBE JEJUNOSTOMY N/A 4/17/2017    Procedure: LAPAROSCOPIC ASSISTED INSERTION TUBE JEJUNOSTOMY;;  Surgeon: Jens Wise MD;  [facility]     LAPAROSCOPY DIAGNOSTIC (GENERAL) N/A 1/9/2017    Procedure: LAPAROSCOPY DIAGNOSTIC (GENERAL);  Surgeon: Jens Wise MD;  [facility]     LAPAROSCOPY DIAGNOSTIC (GENERAL) N/A 4/17/2017    Procedure: LAPAROSCOPY DIAGNOSTIC (GENERAL);  Laparoscopic , Neck Dissection, Spit Fistula Takedown, Laparoscopic Jejunostomy Tube and Pharyngostomy Tube, Gastric Pull up,  "Upper Endoscopy(EGD)  , Flexible Bronchoscopy ,Sternotomy ;  Surgeon: Jens Wise MD;  [facility]     LUMPECTOMY BREAST Right 2007     NERVE BLOCK PERIPHERAL N/A 8/30/2016    Procedure: NERVE BLOCK PERIPHERAL;  Surgeon: GENERIC ANESTHESIA PROVIDER;  [facility]     NISSEN FUNDOPLICATION  1/2016     PHARYNGOSTOMY N/A 4/18/2016    Procedure: PHARYNGOSTOMY;  Surgeon: Alexis Barraza MD;  [facility]     PHARYNGOSTOMY N/A 4/25/2016    Procedure: PHARYNGOSTOMY;  Surgeon: Alexis Barraza MD;  [facility]     PHARYNGOSTOMY N/A 5/4/2016    Procedure: PHARYNGOSTOMY;  Surgeon: Alexis Barraza MD;  [facility]     PHARYNGOSTOMY N/A 6/22/2016    Procedure: PHARYNGOSTOMY;  Surgeon: Alexis Barraza MD;  [facility]     PHARYNGOSTOMY N/A 6/29/2016    Procedure: PHARYNGOSTOMY;  Surgeon: Jens Wise MD;  [facility]     PHARYNGOSTOMY N/A 4/21/2017    Procedure: PHARYNGOSTOMY;;  Surgeon: Jens Wise MD;  [facility]     PHARYNGOSTOMY Left 5/31/2017    Procedure: PHARYNGOSTOMY;;  Surgeon: Nalini Barreto MD;  [facility]     PICC INSERTION Left 8/25/2016    5fr DL BioFlo PICC, 42cm (3cm external) in the L medial brachial vein w/ tip in the SVC RA junction.     PICC INSERTION - Rewire Right 05/31/2017    5fr DL BioFlo PICC, 40cm (6cm external) in the R medial brachial vein w/ tip in the SVC RA junction.     REPAIR FISTULA TRACHEOESOPHAGEAL N/A 9/15/2017    Procedure: REPAIR FISTULA TRACHEOESOPHAGEAL;;  Surgeon: Jens Wise MD;  [facility]     THORACOTOMY Right 1998    lung infection - \"hard crust formed on lung\"     THORACOTOMY Left 1/9/2017    Procedure: THORACOTOMY;  Surgeon: Jens Wise MD;  [facility]     WRIST SURGERY         Soc Hx:   Social History   Substance Use Topics     Smoking status: Former Smoker     Packs/day: 1.00     Years: 15.00     Types: Cigarettes     Quit " date: 1/1/1998     Smokeless tobacco: Never Used     Alcohol use No         Allergies:   Allergies   Allergen Reactions     Amoxicillin Diarrhea     Ativan [Lorazepam] Other (See Comments)     Hallucinations     Morphine Itching       Meds:   Prescriptions Prior to Admission   Medication Sig Dispense Refill Last Dose     cephalexin (KEFLEX) 250 MG/5ML suspension 10 mLs (500 mg) by Per J Tube route 3 times daily for 5 days 150 mL 0 Past Week at Unknown time     diphenoxylate-atropine (LOMOTIL) 2.5-0.025 MG per tablet Take 1 tablet by mouth 3 times daily as needed 40 tablet 1 11/16/2017 at 2000     ferrous sulfate 300 (60 FE) MG/5ML syrup 5 mLs (300 mg) by Per J Tube route daily 150 mL 3 Past Week at Unknown time     TRAMADOL HCL PO Take 25 mg by mouth every 6 hours as needed for moderate to severe pain   Past Month at Unknown time     acetaminophen (TYLENOL) 32 mg/mL solution 30.45 mLs (975 mg) by Per Feeding Tube route 3 times daily as needed 300 mL 3 Past Week at Unknown time     oxyCODONE (ROXICODONE) 5 MG IR tablet Take 1 tablet (5 mg) by mouth See Admin Instructions One tablet every 4-6 hours as needed for pain 84 tablet 0 11/17/2017 at 0400     hydrOXYzine (VISTARIL) 25 MG capsule Take 25 mg by mouth as needed   Past Week at Unknown time     MELATONIN PO Take 5 mg by mouth At Bedtime   11/16/2017 at 2000     pantoprazole (PROTONIX) 40 MG EC tablet Take by mouth 30-60 minutes before a meal. (Patient taking differently: Take 40 mg by mouth daily Take by mouth 30-60 minutes before a meal.) 30 tablet 0 11/16/2017 at 2000     fiber modular (NUTRISOURCE FIBER) packet 1 packet by Per Feeding Tube route 3 times daily (with meals) 60 packet 0 Past Month at Unknown time     traZODone (DESYREL) 100 MG tablet 0.5 tablets (50 mg) by Per G Tube route At Bedtime (Patient taking differently: 150 mg by Per G Tube route At Bedtime ) 30 tablet 0 11/16/2017 at 2000     DiphenhydrAMINE HCl (BENADRYL PO) Take 25 mg by mouth nightly  as needed   11/16/2017 at 2000     cholestyramine (QUESTRAN) 4 G Packet Take 1 packet by mouth daily   Past Week at Unknown time     multivitamins with minerals (CERTAVITE/CEROVITE) LIQD liquid Take 15 mLs by mouth daily   Past Week at Unknown time     loperamide (IMODIUM) 1 MG/5ML liquid Take 20 mLs (4 mg) by mouth 4 times daily as needed for diarrhea (Patient taking differently: Take 4 mg by mouth 2 times daily ) 200 mL  11/16/2017 at 2000     Lactobacillus Rhamnosus, GG, (CULTURELLE PO) Take 1 tablet by mouth 2 times daily    11/16/2017 at 2000     UNABLE TO FIND 2 nell supplement 4 cans daily per g tube   Past Week at Unknown time     order for DME Equipment being ordered:   [facility] Suction Machine-Intermittent  Suction Canisters(2)  Suction Canister Holders(2)  Suction Connect Tube(2)  5 in 1 Connector(2)  Bacteria Filter(2)  Yaunkauer Suction(2)    Treatment Diagnosis: Esophogeal Perforation, s/p esophagectomy 1 Device 0 Unknown at Unknown time     order for DME Equipment being ordered:   Suction Pump  Suction Canister(2)  Suction Tubing(2)  Bacteria Filter(2)  Yaunkauer(4)  Red Rubber Catheter(2)    Treatment Diagnosis: Esophogeal Perforation, s/p esophagectomy 1 Device 0 Unknown at Unknown time       No current outpatient prescriptions on file.         Labs:  BMP:  Recent Labs   Lab Test  11/15/17   1651   NA  136   POTASSIUM  4.1   CHLORIDE  101   CO2  31   BUN  14   CR  0.48*   GLC  80   NELL  9.0     CBC:   Recent Labs   Lab Test  11/15/17   1651   WBC  11.2*   RBC  3.70*   HGB  11.0*   HCT  36.2   MCV  98   MCH  29.7   MCHC  30.4*   RDW  13.5   PLT  380     Coags:  Recent Labs   Lab Test  09/15/17   0605   06/04/17   0728   05/19/17   1650   INR  0.98   < >  1.07   < >  1.87*   PTT   --    --   44*   < >  54*   FIBR   --    --    --    --   497*    < > = values in this interval not displayed.       Vital Signs:  T 98.5  P Data Unavailable  /67  R 16  SpO2 100%    Anesthetic Assessment and Plan:    72  year old female with PMH of esophagectomy and mediastinal phlegmon surgery complicated by anastomotic leak, esophageal stent placement and multiple revisions. Other PMH has a past medical history of Arrhythmia; Atrial fibrillation (H); Breast cancer (H) (2007); Chronic infection; Esophageal perforation; Fibromyalgia; GERD (gastroesophageal reflux disease); Hiatal hernia; History of blood transfusion; IBS (irritable bowel syndrome); Meniere's disease; MS (multiple sclerosis) (H); Multiple sclerosis (H); Noninfectious ileitis; and Other chronic pain. to OR today for Procedure(s):  Esophagogastroduodenoscopy, Stent Revision, Cauterization Of Tracheoesophageal Fistula - Wound Class: II-Clean Contaminated  Has recently undergone shoulder surgery.  NPO status adequate.    ASA 3    Plan for GA, standard monitors. possible invasive monitors ,large bore IVs, possible blood transfusion, possible postoperative ICU.  PONV prophylaxis. Antibiotics per surgery.    Prior to anesthetic I have interviewed and examined the patient, reviewed the past medical history, laboratory and other results, and discussed the anesthetic plan with the anesthesia and surgery teams.  Anesthetic risks discussed with the patient, consent in chart.      Anaya Enriquez MD  Anesthesiology Attending  11/17/17                      Anesthesia Plan      History & Physical Review  History and physical reviewed and following examination; no interval change.    ASA Status:  3 .    NPO Status:  > 8 hours    Plan for General and ETT with Intravenous induction. Maintenance will be Balanced.    PONV prophylaxis:  Ondansetron (or other 5HT-3)  Additional equipment: 2nd IV      Postoperative Care  Postoperative pain management:  IV analgesics.      Consents  Anesthetic plan, risks, benefits and alternatives discussed with:  Patient..                          .

## 2017-11-17 NOTE — PROGRESS NOTES
1755  Requesting pain med for Left shoulder pain, which she states is moderate in nature.  1800  Order received from Dr. Villareal for Oxycodone 5mg tab thru J-tube.  1805  When returned to room pt was sleeping.  Will hold Oxycodone for now.   at bedside.  CTRN  1830  Awake now, requesting pain med.  1840  Oxycodone 5mg given now per J-tube for Left shoulder pain.  Pt and  decline discharge instructions as she has had this procedure done frequently.  1850  DC to care of  per WC accompanied by staff.  CTRN

## 2017-11-18 ENCOUNTER — APPOINTMENT (OUTPATIENT)
Dept: INTERVENTIONAL RADIOLOGY/VASCULAR | Facility: CLINIC | Age: 72
End: 2017-11-18
Attending: PHYSICIAN ASSISTANT
Payer: MEDICARE

## 2017-11-18 ENCOUNTER — HOSPITAL ENCOUNTER (OUTPATIENT)
Facility: CLINIC | Age: 72
Discharge: HOME | End: 2017-11-18
Attending: RADIOLOGY | Admitting: RADIOLOGY
Payer: MEDICARE

## 2017-11-18 VITALS
SYSTOLIC BLOOD PRESSURE: 108 MMHG | DIASTOLIC BLOOD PRESSURE: 56 MMHG | HEART RATE: 73 BPM | OXYGEN SATURATION: 97 % | RESPIRATION RATE: 16 BRPM

## 2017-11-18 PROCEDURE — C1887 CATHETER, GUIDING: HCPCS

## 2017-11-18 PROCEDURE — 27210915 ZZ H TUBE GASTRO CR4

## 2017-11-18 PROCEDURE — 25000125 ZZHC RX 250: Performed by: PHYSICIAN ASSISTANT

## 2017-11-18 PROCEDURE — 49451 REPLACE DUOD/JEJ TUBE PERC: CPT

## 2017-11-18 PROCEDURE — 27211065 ZZ H TUBE GASTRO CR10

## 2017-11-18 PROCEDURE — 40000141 ZZH STATISTIC PERIPHERAL IV START W/O US GUIDANCE

## 2017-11-18 PROCEDURE — 25500064 ZZH RX 255 OP 636: Performed by: RADIOLOGY

## 2017-11-18 PROCEDURE — 27210905 ZZH KIT CR7

## 2017-11-18 PROCEDURE — 25000128 H RX IP 250 OP 636: Performed by: PHYSICIAN ASSISTANT

## 2017-11-18 PROCEDURE — 27210738 ZZH ACCESSORY CR2

## 2017-11-18 PROCEDURE — C1769 GUIDE WIRE: HCPCS

## 2017-11-18 RX ORDER — LIDOCAINE 40 MG/G
CREAM TOPICAL
Status: DISCONTINUED | OUTPATIENT
Start: 2017-11-18 | End: 2017-11-18 | Stop reason: HOSPADM

## 2017-11-18 RX ORDER — FENTANYL CITRATE 50 UG/ML
25 INJECTION, SOLUTION INTRAMUSCULAR; INTRAVENOUS ONCE
Status: COMPLETED | OUTPATIENT
Start: 2017-11-18 | End: 2017-11-18

## 2017-11-18 RX ORDER — NALOXONE HYDROCHLORIDE 0.4 MG/ML
.1-.4 INJECTION, SOLUTION INTRAMUSCULAR; INTRAVENOUS; SUBCUTANEOUS
Status: DISCONTINUED | OUTPATIENT
Start: 2017-11-18 | End: 2017-11-18 | Stop reason: HOSPADM

## 2017-11-18 RX ORDER — IOPAMIDOL 510 MG/ML
50 INJECTION, SOLUTION INTRAVASCULAR ONCE
Status: COMPLETED | OUTPATIENT
Start: 2017-11-18 | End: 2017-11-18

## 2017-11-18 RX ORDER — FLUMAZENIL 0.1 MG/ML
0.5 INJECTION, SOLUTION INTRAVENOUS ONCE
Status: DISCONTINUED | OUTPATIENT
Start: 2017-11-18 | End: 2017-11-18 | Stop reason: HOSPADM

## 2017-11-18 RX ADMIN — FENTANYL CITRATE 150 MCG: 50 INJECTION, SOLUTION INTRAMUSCULAR; INTRAVENOUS at 11:31

## 2017-11-18 RX ADMIN — LIDOCAINE HYDROCHLORIDE 10 ML: 10 INJECTION, SOLUTION EPIDURAL; INFILTRATION; INTRACAUDAL; PERINEURAL at 11:40

## 2017-11-18 RX ADMIN — MIDAZOLAM 3 MG: 1 INJECTION INTRAMUSCULAR; INTRAVENOUS at 11:30

## 2017-11-18 RX ADMIN — IOPAMIDOL 25 ML: 510 INJECTION, SOLUTION INTRAVASCULAR at 11:45

## 2017-11-18 NOTE — PROGRESS NOTES
Patient Name: Minerva Blanco  Medical Record Number: [id number]  Today's Date: 11/17/2017    Procedure: J tube assessment  Proceduralist: Dr. Chaudhry    Sedation start time: flakita  Sedation end time: NA  Sedation medications administered: None    Procedure start time: 1800  Puncture time: NA  Procedure end time: 1815    Report given to: FLAKITA  : FLAKITA    Other Notes: Pt and  came to [facility] waiting room without notice. Pt states they were driving home and J tube started to lead around tube. Dr Chaudhry spoke with pt and  regarding care options at this time. Pt agreed to continue with tube exchange without and sedation. Pt positioned supine. Dr. Chaudhry examed tube and balloon. Balloon readjusted by Dr. Chaudhry  IR nurse will call pt in am to see if tube is leaking or not. Pt and  agreed with plan. JOE Ferraro RN

## 2017-11-18 NOTE — PROGRESS NOTES
Report received from CECI Khan RN.   Patient alert and oriented.  VSS.  J tube flushed and clamped.   Enrique at bedside./Sadie Hilario RN, BSN 11:54 AM November 18, 2017

## 2017-11-18 NOTE — PROGRESS NOTES
Interventional Radiology Pre-Procedure Sedation Assessment   Time of Assessment: 10:59 AM    Expected Level: Minimal Sedation    Indication: Sedation is required for the following type of Procedure: GI    Sedation and procedural consent: Risks, benefits and alternatives were discussed with Patient and Spouse    PO Intake: Appropriately NPO for procedure    ASA Class: Class 2 - MILD SYSTEMIC DISEASE, NO ACUTE PROBLEMS, NO FUNCTIONAL LIMITATIONS.    Mallampati: Grade 2:  Soft palate, base of uvula, tonsillar pillars, and portion of posterior pharyngeal wall visible    Lungs: Lungs Clear with good breath sounds bilaterally    Heart: Normal heart sounds and rate    History and physical reviewed and no updates needed. I have reviewed the lab findings, diagnostic data, medications, and the plan for sedation. I have determined this patient to be an appropriate candidate for the planned sedation and procedure and have reassessed the patient IMMEDIATELY PRIOR to sedation and procedure.    Freya Whitfield PA-C

## 2017-11-18 NOTE — PROCEDURES
Interventional Radiology Brief Post Procedure Note    Procedure: IR JEJUNOSTOMY TUBE CHANGE    Proceduralist: Freya Whitfield MS, PA-C    Assistant: None    Time Out: Prior to the start of the procedure and with procedural staff participation, I verbally confirmed the patient s identity using two indicators, relevant allergies, that the procedure was appropriate and matched the consent or emergent situation, and that the correct equipment/implants were available. Immediately prior to starting the procedure I conducted the Time Out with the procedural staff and re-confirmed the patient s name, procedure, and site/side. (The Joint Commission universal protocol was followed.)  Yes    Medications   Medication Event Details Admin User Admin Time   midazolam (VERSED) injection 0.5 mg Medication Given Dose: 3 mg; Route: Intravenous Wlater Khan RN 11/18/2017 11:30 AM   fentaNYL (PF) (SUBLIMAZE) injection 25 mcg Medication Given Dose: 150 mcg; Route: Intravenous; Scheduled Time: 11:15 AM Walter Khan RN 11/18/2017 11:31 AM   lidocaine 1 % 1-30 mL Medication Given by Other Dose: 10 mL; Route: Intradermal; Comment: Walter Hernández PA-C, RN 11/18/2017 11:40 AM       Sedation: IR Nurse Monitored Care   Post Procedure Summary:  Prior to the start of the procedure and with procedural staff participation, I verbally confirmed the patient s identity using two indicators, relevant allergies, that the procedure was appropriate and matched the consent or emergent situation, and that the correct equipment/implants were available. Immediately prior to starting the procedure I conducted the Time Out with the procedural staff and re-confirmed the patient s name, procedure, and site/side. (The Joint Commission universal protocol was followed.)  Yes       Sedatives: Fentanyl and Midazolam (Versed)    Vital signs, airway and pulse oximetry were monitored and remained stable throughout the procedure and sedation  was maintained until the procedure was complete.  The patient was monitored by staff until sedation discharge criteria were met.    Patient tolerance: Patient tolerated the procedure well with no immediate complications.    Time of sedation in minutes: 30 Minutes minutes from beginning to end of physician one to one monitoring.    Findings: Completed fluoroscopy-guidedjejunostomy tube exchange and upsize for new 16 Mongolian 30 cm jejunostomy tube. Patient to return in 9-12 months for routine exchange, or sooner if indicated.    Estimated Blood Loss: Minimal    Fluoroscopy Time: 4.5 minute(s)    SPECIMENS: None    Complications: 1. None     Condition: Stable    Plan: May resume use of feeding tube for mediations and feeding immediately.     Comments: See dictated procedure note for full details.    Freya Whitfield PA-C

## 2017-11-18 NOTE — PROGRESS NOTES
Interventional Radiology Intra-procedural Nursing Note    Patient Name: Minerva Blanco  Medical Record Number: [id number]  Today's Date: November 18, 2017    Start Time: 1100  End of procedure time: 1130  Procedure: Jejunostomy Tube Exchange / Upsize  Report given to: Asiya VARGHESE RN IR  Time pt departs: 1140  Proceduralist: Freya Whitfield PA-C    Other Notes:      Pt to IR Room #4 from ED Waiting Room.  Consent confirmed with patient; questions addressed. Pt positioned supine and prepped on IR table by technologist. Freya Whitfield PA-C exchanged 14 fr Jejunostomy Tube with 16 fr Jejunostomy tube over-the-wire.  Pt tolerated procedure without apparent incident. Pt denies pain post-procedure.    Sedation Time = 30 minutes  Fentanyl given: 150 mcg  Versed given: 3 mg    Walter Khan

## 2017-11-18 NOTE — PROGRESS NOTES
Patient slept through recovery phase.  Denies pain, vital signs stable.  Right forearm peripheral IV removed.  Patient given instructions how to contact IR physicians if future concerns arise:  Pager [phone]; or call hospital  and ask to have the Interventional Radiology physician on call paged.  J tube flushed and ready for immediate use.  Escorted to front lobby via wheelchair and discharged to home with her , Enrique./Sadie Hilario RN November 18, 2017 12:41 PM

## 2017-11-21 ENCOUNTER — DOCUMENTATION ONLY (OUTPATIENT)
Dept: CARE COORDINATION | Facility: CLINIC | Age: 72
End: 2017-11-21

## 2017-11-21 NOTE — PROGRESS NOTES
THORACIC SURGERY FOLLOW UP VISIT      Dear Dr. Carroll,  I saw Mrs. Minerva Blanco in follow-up today. The clinical summary follows:      PREOP DIAGNOSIS   1.  Chronic esophageal perforation with mediastinal phlegmon.    2.  Status post fundoplication.        PROCEDURE   1/9/2017  1.  Esophagogastroscopy.    2.  Laparoscopic lysis of adhesions.    3.  Laparoscopic proximal gastrectomy and gastrostomy tube placement.    4.  Left thoracotomy with excision of mediastinal phlegmon and distal esophagectomy.    5.  Thoracic duct ligation.    6.  Right tube thoracostomy.    7.  Left esophageal spit fistula.    8.  Bronchoscopy.        6/9/2017  1. Esophagogastroscopy  2. Esophageal stent placement      7/17/2017  1. Esophagogastroduodenoscopy with stent exchange  2. Pharyngostomy tube revision      Multiple EGD, stent revision and cauterization of esophagocutaneous fistula (most recent: 11/17/2017)      PRIOR PROCEDURES  1) Multiple endoscopies and pharyngostomy tube placement x 2 (for drainage of paraesophageal cavity)  2) Esophageal stent placement, attempted placement of biliary stent into the paraesophageal cavity (7/22/2016)  3) Esophageal stent removal, placement of retrograde gastroesophageal tube (10/23/2016)  4) Toupet fundoplication (1/2016)          COMPLICATIONS  Thoracotomy wound infection requiring antibiotics      INTERVAL STUDIES  CT chest 11/1/2017: Esophagocutaneous fistula seen again on CT        TOB never  ETOH negative      SUBJECTIVE  She has recently had multiple issues with her J tube requiring exchange and upsizing, which have resulted in excessive frustration but are now resolved. She also claims the output from the fistula has increased and the size of the wound has increased since her last procedure.      From a personal perspective, she is here with her  Enrique, she recently had Lt shoulder surgery which she is recovering from.          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 Minerva Blanco and Enrique, more than 50% of which were spent in counseling, coordination of care, and face-to-face time. I reviewed the plan as follows:  We have not made any major progress, and we had a long discussion about the need for a primary repair in combination wit Dr. Lester   1) Follow-up in clinic next week with nutrition labs and we will see her then with Dr. Lester as well.

## 2017-11-21 NOTE — PROGRESS NOTES
Dear Dr. Andrea Nino  Medicare Home Health regulations requires [facility] to notify the Physician when the plan for visits has been altered.  We have provided fewer visits than ordered.  We are notifying you of a Missed Visit.  Minerva Blanco; MRN [id number]  Missed Visit  Is SN   Dates of missed services  11/20/17  Reason: Patient cancelled   Sincerely [facility]  Antoinette Garcia  [phone]

## 2017-11-22 ENCOUNTER — OFFICE VISIT (OUTPATIENT)
Dept: SURGERY | Facility: CLINIC | Age: 72
End: 2017-11-22
Attending: STUDENT IN AN ORGANIZED HEALTH CARE EDUCATION/TRAINING PROGRAM
Payer: MEDICARE

## 2017-11-22 ENCOUNTER — RECORDS - HEALTHEAST (OUTPATIENT)
Dept: ADMINISTRATIVE | Facility: OTHER | Age: 72
End: 2017-11-22

## 2017-11-22 VITALS
WEIGHT: 92.15 LBS | HEART RATE: 75 BPM | DIASTOLIC BLOOD PRESSURE: 74 MMHG | TEMPERATURE: 98 F | BODY MASS INDEX: 18 KG/M2 | OXYGEN SATURATION: 99 % | RESPIRATION RATE: 18 BRPM | SYSTOLIC BLOOD PRESSURE: 117 MMHG

## 2017-11-22 DIAGNOSIS — K22.3 ESOPHAGEAL PERFORATION: Primary | ICD-10-CM

## 2017-11-22 DIAGNOSIS — K91.89 ESOPHAGECTOMY, ANASTOMOTIC LEAK: ICD-10-CM

## 2017-11-22 DIAGNOSIS — K22.89: ICD-10-CM

## 2017-11-22 PROCEDURE — 99213 OFFICE O/P EST LOW 20 MIN: CPT | Mod: ZF

## 2017-11-22 PROCEDURE — 99214 OFFICE O/P EST MOD 30 MIN: CPT | Mod: ZF

## 2017-11-22 PROCEDURE — 99212 OFFICE O/P EST SF 10 MIN: CPT | Mod: ZF

## 2017-11-22 ASSESSMENT — PAIN SCALES - GENERAL: PAINLEVEL: NO PAIN (0)

## 2017-11-22 NOTE — NURSING NOTE
Oncology Rooming Note    November 22, 2017 7:52 AM   Minerva Blanco is a 72 year old female who presents for:    Chief Complaint   Patient presents with     Oncology Clinic Visit     Return for Fistula in chest     Initial Vitals: /74  Pulse 75  Temp 98  F (36.7  C) (Oral)  Resp 18  Wt 41.8 kg (92 lb 2.4 oz)  SpO2 99%  BMI 18 kg/m2 Estimated body mass index is 18 kg/(m^2) as calculated from the following:    Height as of 11/17/17: 1.524 m (5').    Weight as of this encounter: 41.8 kg (92 lb 2.4 oz). Body surface area is 1.33 meters squared.  No Pain (0) Comment: Data Unavailable   No LMP recorded. Patient is postmenopausal.  Allergies reviewed: Yes  Medications reviewed: Yes    Medications: Medication refills not needed today.  Pharmacy name entered into [facility]:    [id number] - [place], [place] - [address] [facility] [place], [place] - [address]    Clinical concerns: wade Wise  was notified.    8 minutes for nursing intake (face to face time)     Jaky Martinez MA

## 2017-11-22 NOTE — LETTER
11/22/2017      RE: Minerva Blanco  [address]   [facility] [address]       THORACIC SURGERY FOLLOW UP VISIT      Dear Dr. Carroll,  I saw . Minerva Blanco in follow-up today. The clinical summary follows:      PREOP DIAGNOSIS   1.  Chronic esophageal perforation with mediastinal phlegmon.    2.  Status post fundoplication.        PROCEDURE   1/9/2017  1.  Esophagogastroscopy.    2.  Laparoscopic lysis of adhesions.    3.  Laparoscopic proximal gastrectomy and gastrostomy tube placement.    4.  Left thoracotomy with excision of mediastinal phlegmon and distal esophagectomy.    5.  Thoracic duct ligation.    6.  Right tube thoracostomy.    7.  Left esophageal spit fistula.    8.  Bronchoscopy.        6/9/2017  1. Esophagogastroscopy  2. Esophageal stent placement      7/17/2017  1. Esophagogastroduodenoscopy with stent exchange  2. Pharyngostomy tube revision      Multiple EGD, stent revision and cauterization of esophagocutaneous fistula (most recent: 11/17/2017)      PRIOR PROCEDURES  1) Multiple endoscopies and pharyngostomy tube placement x 2 (for drainage of paraesophageal cavity)  2) Esophageal stent placement, attempted placement of biliary stent into the paraesophageal cavity (7/22/2016)  3) Esophageal stent removal, placement of retrograde gastroesophageal tube (10/23/2016)  4) Toupet fundoplication (1/2016)          COMPLICATIONS  Thoracotomy wound infection requiring antibiotics      INTERVAL STUDIES  CT chest 11/1/2017: Esophagocutaneous fistula seen again on CT        TOB never  ETOH negative      SUBJECTIVE  She has recently had multiple issues with her J tube requiring exchange and upsizing, which have resulted in excessive frustration but are now resolved. She also claims the output from the fistula has increased and the size of the wound has increased since her last procedure.      From a personal perspective, she is here with her  Enrique, she recently had Lt shoulder  surgery which she is recovering from.          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 Minerva Blanco and Enrique, more than 50% of which were spent in counseling, coordination of care, and face-to-face time. I reviewed the plan as follows:  We have not made any major progress, and we had a long discussion about the need for a primary repair in combination wit Dr. Lester   1) Follow-up in clinic next week with nutrition labs and we will see her then with Dr. Lester as well.      Jens Wise MD

## 2017-11-22 NOTE — MR AVS SNAPSHOT
After Visit Summary   11/22/2017    Minerva Blanco    MRN: [id number]           Patient Information     Date Of Birth          1945        Visit Information        Provider Department      11/22/2017 8:00 AM Jens Wise MD [facility]        Today's Diagnoses     Esophageal perforation    -  1    Esophagectomy, anastomotic leak        Esophagocutaneous fistula           Follow-ups after your visit        Follow-up notes from your care team     Return in about 1 week (around 11/29/2017).      Future tests that were ordered for you today     Open Future Orders        Priority Expected Expires Ordered    Albumin level Routine  11/22/2018 11/22/2017    Prealbumin Routine  11/22/2018 11/22/2017            Who to contact     If you have questions or need follow up information about today's clinic visit or your schedule please contact [facility] directly at [phone].  Normal or non-critical lab and imaging results will be communicated to you by MyChart, letter or phone within 4 business days after the clinic has received the results. If you do not hear from us within 7 days, please contact the clinic through [employer] or phone. If you have a critical or abnormal lab result, we will notify you by phone as soon as possible.  Submit refill requests through [employer] or call your pharmacy and they will forward the refill request to us. Please allow 3 business days for your refill to be completed.          Additional Information About Your Visit        MyChart Information     [employer] gives you secure access to your electronic health record. If you see a primary care provider, you can also send messages to your care team and make appointments. If you have questions, please call your primary care clinic.  If you do not have a primary care provider, please call [phone] and they will assist you.        Care EveryWhere ID     This is your Care EveryWhere  ID. This could be used by other organizations to access your [place] medical records  [id number]        Your Vitals Were     Pulse Temperature Respirations Pulse Oximetry BMI (Body Mass Index)       75 98  F (36.7  C) (Oral) 18 99% 18 kg/m2        Blood Pressure from Last 3 Encounters:   11/22/17 117/74   11/18/17 108/56   11/17/17 118/68    Weight from Last 3 Encounters:   11/22/17 41.8 kg (92 lb 2.4 oz)   11/17/17 42.3 kg (93 lb 4.1 oz)   11/16/17 42.2 kg (93 lb)                 Today's Medication Changes          These changes are accurate as of: 11/22/17 11:59 PM.  If you have any questions, ask your nurse or doctor.               These medicines have changed or have updated prescriptions.        Dose/Directions    loperamide 1 MG/5ML liquid   Commonly known as:  IMODIUM   This may have changed:  when to take this   Used for:  Bowel habit changes        Dose:  4 mg   Take 20 mLs (4 mg) by mouth 4 times daily as needed for diarrhea   Quantity:  200 mL   Refills:  0       pantoprazole 40 MG EC tablet   Commonly known as:  PROTONIX   This may have changed:    - how much to take  - how to take this  - when to take this  - additional instructions   Used for:  Gastroesophageal reflux disease, esophagitis presence not specified        Take by mouth 30-60 minutes before a meal.   Quantity:  30 tablet   Refills:  0       traZODone 100 MG tablet   Commonly known as:  DESYREL   This may have changed:  how much to take   Used for:  Insomnia, unspecified type        Dose:  50 mg   0.5 tablets (50 mg) by Per G Tube route At Bedtime   Quantity:  30 tablet   Refills:  0                Primary Care Provider Office Phone # Fax #    Andrea Nino [phone]       [place] [address] [address]        Equal Access to Services     TOM JORGENSEN AH: Dora Meng, isis romero, florentin palomo. So [facility]  [phone].    ATENCIÓN: Si jareth rojo, tiene a jackson disposición servicios gratuitos de asistencia lingüística. Sreekanth tee [phone].    We comply with applicable federal civil rights laws and [place] laws. We do not discriminate on the basis of race, color, national origin, age, disability, sex, sexual orientation, or gender identity.            Thank you!     Thank you for choosing [facility]  for your care. Our goal is always to provide you with excellent care. Hearing back from our patients is one way we can continue to improve our services. Please take a few minutes to complete the written survey that you may receive in the mail after your visit with us. Thank you!             Your Updated Medication List - Protect others around you: Learn how to safely use, store and throw away your medicines at www.disposemymeds.org.          This list is accurate as of: 11/22/17 11:59 PM.  Always use your most recent med list.                   Brand Name Dispense Instructions for use Diagnosis    acetaminophen 32 mg/mL solution    TYLENOL    300 mL    30.45 mLs (975 mg) by Per Feeding Tube route 3 times daily as needed    Esophageal perforation       BENADRYL PO      Take 25 mg by mouth nightly as needed        cholestyramine 4 G Packet    QUESTRAN     Take 1 packet by mouth daily        CULTURELLE PO      Take 1 tablet by mouth 2 times daily        diphenoxylate-atropine 2.5-0.025 MG per tablet    LOMOTIL    40 tablet    Take 1 tablet by mouth 3 times daily as needed    Esophageal anastomotic leak       ferrous sulfate 300 (60 FE) MG/5ML syrup     150 mL    5 mLs (300 mg) by Per J Tube route daily    Esophageal anastomotic leak       fiber modular packet     60 packet    1 packet by Per Feeding Tube route 3 times daily (with meals)    History of esophagectomy       hydrOXYzine 25 MG capsule    VISTARIL     Take 25 mg by mouth as needed        loperamide 1 MG/5ML liquid    IMODIUM    200 mL    Take  20 mLs (4 mg) by mouth 4 times daily as needed for diarrhea    Bowel habit changes       MELATONIN PO      Take 5 mg by mouth At Bedtime        multivitamins with minerals Liqd liquid      Take 15 mLs by mouth daily        * order for DME     1 Device    Equipment being ordered:  [facility] Suction Machine-Intermittent Suction Canisters(2) Suction Canister Holders(2) Suction Connect Tube(2) 5 in 1 Connector(2) Bacteria Filter(2) [employer]unkauer Suction(2)  Treatment Diagnosis: Esophogeal Perforation, s/p esophagectomy    Hx of esophagectomy       * order for DME     1 Device    Equipment being ordered:  Suction Pump Suction Canister(2) Suction Tubing(2) Bacteria Filter(2) Yaunkauer(4) Red Rubber Catheter(2)  Treatment Diagnosis: Esophogeal Perforation, s/p esophagectomy    Esophageal perforation       oxyCODONE IR 5 MG tablet    ROXICODONE    84 tablet    Take 1 tablet (5 mg) by mouth See Admin Instructions One tablet every 4-6 hours as needed for pain    Esophageal anastomotic leak       pantoprazole 40 MG EC tablet    PROTONIX    30 tablet    Take by mouth 30-60 minutes before a meal.    Gastroesophageal reflux disease, esophagitis presence not specified       TRAMADOL HCL PO      Take 25 mg by mouth every 6 hours as needed for moderate to severe pain        traZODone 100 MG tablet    DESYREL    30 tablet    0.5 tablets (50 mg) by Per G Tube route At Bedtime    Insomnia, unspecified type       UNABLE TO FIND      2 nell supplement 4 cans daily per g tube        * Notice:  This list has 2 medication(s) that are the same as other medications prescribed for you. Read the directions carefully, and ask your doctor or other care provider to review them with you.

## 2017-11-22 NOTE — Clinical Note
Pt needs to be seen by Dr. Wise and Dr. Lester (Plastics) at the same time next Wednesday. She also needs labs drawn prior to her appt. I put orders in. She left. So please call her

## 2017-11-27 ENCOUNTER — HOME INFUSION (PRE-WILLOW HOME INFUSION) (OUTPATIENT)
Dept: PHARMACY | Facility: CLINIC | Age: 72
End: 2017-11-27

## 2017-11-28 NOTE — PROGRESS NOTES
This is a recent snapshot of the patient's [facility] medical record.  For current drug dose and complete information and questions, call [phone] or In Basket pool, [facility] ([id number])  CSN Number:  [id number]

## 2017-12-06 ENCOUNTER — RECORDS - HEALTHEAST (OUTPATIENT)
Dept: ADMINISTRATIVE | Facility: OTHER | Age: 72
End: 2017-12-06

## 2017-12-06 ENCOUNTER — OFFICE VISIT (OUTPATIENT)
Dept: PLASTIC SURGERY | Facility: CLINIC | Age: 72
End: 2017-12-06

## 2017-12-06 ENCOUNTER — OFFICE VISIT (OUTPATIENT)
Dept: SURGERY | Facility: CLINIC | Age: 72
End: 2017-12-06
Attending: THORACIC SURGERY (CARDIOTHORACIC VASCULAR SURGERY)
Payer: MEDICARE

## 2017-12-06 VITALS
HEIGHT: 60 IN | HEART RATE: 79 BPM | WEIGHT: 97 LBS | DIASTOLIC BLOOD PRESSURE: 43 MMHG | SYSTOLIC BLOOD PRESSURE: 97 MMHG | BODY MASS INDEX: 19.04 KG/M2 | OXYGEN SATURATION: 100 %

## 2017-12-06 VITALS
BODY MASS INDEX: 18.38 KG/M2 | OXYGEN SATURATION: 97 % | DIASTOLIC BLOOD PRESSURE: 43 MMHG | WEIGHT: 94.14 LBS | TEMPERATURE: 98.2 F | RESPIRATION RATE: 18 BRPM | HEART RATE: 79 BPM | SYSTOLIC BLOOD PRESSURE: 97 MMHG

## 2017-12-06 DIAGNOSIS — K91.89 ESOPHAGECTOMY, ANASTOMOTIC LEAK: Primary | ICD-10-CM

## 2017-12-06 DIAGNOSIS — K91.89 ESOPHAGEAL ANASTOMOTIC LEAK: Primary | ICD-10-CM

## 2017-12-06 DIAGNOSIS — K22.89 ESOPHAGEAL FISTULA: Primary | ICD-10-CM

## 2017-12-06 PROCEDURE — 99212 OFFICE O/P EST SF 10 MIN: CPT

## 2017-12-06 ASSESSMENT — PAIN SCALES - GENERAL
PAINLEVEL: NO PAIN (0)
PAINLEVEL: NO PAIN (0)

## 2017-12-06 NOTE — LETTER
12/6/2017       RE: Minerva Blanco  [address]   [facility] [address]     Dear Colleague,    Thank you for referring your patient, Minerva Blanco, to the [facility] PLASTIC AND RECONSTRUCTIVE SURGERY at [facility]. Please see a copy of my visit note below.    REFERRING PROVIDER:  Dr. Jens Wise, [facility] Thoracic Surgery.      PRESENTING COMPLAINT:  Consultation for esophageal cutaneous fistula.      HISTORY OF PRESENTING COMPLAINT:  Ms. Blanco is 72 years old.  Her history goes back to the beginning of 2017 when for a chronic esophageal perforation esophagectomy and partial gastrectomy with esophageal spit fistula was carried out.  In April of this year, a gastric pull-up procedure along with takedown of the fistula with a J-tube placement was carried out.  This unfortunately led to a leak and a fistula to the skin.  Despite a number of stenting procedures as well as conservative therapies the fistula has remained.  She has got a nonhealing chronic wound on the anterior right side chest and of course this goes all the way down to the esophagus.  The patient has been n.p.o., has been on tube feeds, has of course lost weight and is weak.  Her last albumin level was 2.9 and prealbumin 19 in 07/2017.  Last hemoglobin in 11/2017 was 11.  She has been referred to me for my recommendations regarding possible buttressing of the fistula repair as well as wound closure at the time of possible takedown of this fistula.      PAST MEDICAL HISTORY:  Nil.      PAST SURGICAL HISTORY:  As per HPI and shoulder decompression.      MEDICATIONS:  Lomotil, tramadol, oxycodone, Protonix.      ALLERGIES:  Amoxicillin, Ativan and morphine.      SOCIAL HISTORY:  She does not smoke.  Used to smoke about a pack a day for 15 years, quit 1998.  Does not drink alcohol.  Is retired.      REVIEW OF SYSTEMS:  Denies chest pain, shortness of breath, MI, CVA, DVT and PE.      PHYSICAL EXAMINATION:  Vital signs stable.  She is afebrile, in no obvious distress. She has a BMI of 18 kg/m2.  On examination of her chest, she is quite thin.  She has a 4 x 4 cm area of irritated skin with an opening that goes down between the ribs just right of the midline in the third intercostal space area.  She has a firing palpable pectoralis major muscle lateral to the wound.  She is relatively small breasted.  She has multiple scars in the upper abdomen from previous chest tubes.  She has no abdominal hernia.  She has a skin pinch thickness about 3 cm.      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  I had a long discussion with the patient and her  about her findings and explained to them that one option is to just follow this conservatively and allow this fistula to hopefully heal itself in over time, although it has been almost 9 months and it has not healed in completely.  The patient is extremely sick of this and wants this fixed once and for all and is inclined to proceeding with surgical correction rather than giving this more time.  If we do go down the surgical route, Dr. Wise will most likely need to take down the fistula, repair the fistula either primarily and then we will buttress that fistula with either a pectoralis major muscle flap versus a rectus flap versus an omental flap.  If, however, he cannot close it primarily, but needs a piece of skin in the flap to help close that fistula, then a pectoralis major musculocutaneous flap will be required.  I explained to the patient and her  the concept behind these flaps.  With regards to the soft tissue wound, most likely that area of skin and most likely the underlying bone will be removed and then closure can be done primarily.  All of this was discussed in detail.  All risks, benefits and alternatives of the potential procedure including pain, infection, bleeding, scarring, asymmetry,  seromas, hematomas, wound breakdown, wound dehiscence, loss of the flaps, requirement of further surgeries, wound healing complications, injury to deeper structures like mediastinal structures, nerves, vessels, lung, DVT, PE, MI, CVA, pneumonia, renal failure and death were explained.  I will discuss her case with Dr. Wise.  We will plan this sometime in January or February.  That will give time for the patient's conservative treatment therapy to see which way it will go and then ultimately we will proceed if this stalls.  She was okay with this plan.  In the interim, we will have her follow up with her primary care to get cardiac and pulmonary clearance as well as we will recheck her prealbumin and albumin levels.  I reiterated the importance of a high protein diet.  All questions were answered.  They were happy with the visit.  All exam was done in the presence of my nurse.  Total time spent with the patient was 40 minutes, more than half was counseling.         BARBARA LESTER MD             D: 2017 17:10   T: 2017 20:10   MT: LAZARUS      Name:     FAVIAN MALONE   MRN:      [id number]        Account:      [id number]   :      1945           Service Date: 2017      Document: [id number]        Again, thank you for allowing me to participate in the care of your patient.      Sincerely,    BARBARA Lester MD    cc:   Jens Wise MD    Physicians    [address]  [address]

## 2017-12-06 NOTE — PROGRESS NOTES
THORACIC SURGERY FOLLOW UP VISIT    Dear Dr. Carroll,  I saw Mrs. Minerva Blanco in follow-up today. The clinical summary follows:    PREOP DIAGNOSIS   1.  Chronic esophageal perforation with mediastinal phlegmon.    2.  Status post fundoplication.        PROCEDURE   1/9/2017  1.  Esophagogastroscopy.    2.  Laparoscopic lysis of adhesions.    3.  Laparoscopic proximal gastrectomy and gastrostomy tube placement.    4.  Left thoracotomy with excision of mediastinal phlegmon and distal esophagectomy.    5.  Thoracic duct ligation.    6.  Right tube thoracostomy.    7.  Left esophageal spit fistula.    8.  Bronchoscopy.        6/9/2017  1. Esophagogastroscopy  2. Esophageal stent placement      7/17/2017  1. Esophagogastroduodenoscopy with stent exchange  2. Pharyngostomy tube revision      Multiple EGD, stent revision and cauterization of esophagocutaneous fistula (most recent: 11/17/2017)    11/18/17  IR jejunostomy tube change      PRIOR PROCEDURES  1) Multiple endoscopies and pharyngostomy tube placement x 2 (for drainage of paraesophageal cavity)  2) Esophageal stent placement, attempted placement of biliary stent into the paraesophageal cavity (7/22/2016)  3) Esophageal stent removal, placement of retrograde gastroesophageal tube (10/23/2016)  4) Toupet fundoplication (1/2016)    COMPLICATIONS  Thoracotomy wound infection requiring antibiotics  Persistent esophagocutaneous fistula    INTERVAL STUDIES  11/1/17 CT Chest - Esophagocutaneous fistula seen again on CT.      ETOH negative  TOB never  BMI18    SUBJECTIVE  Mrs. Blanco has not had any drainage from her cutaneous fistula. She has a 6-8 mm round area of granulation tissue that I treated with silver nitrate.    From a personal perspective, she is here with Enrique.    IMPRESSION (K91.89) Esophagectomy, anastomotic leak  (primary encounter diagnosis)    72 year-old female S/P retrosternal gastric pull-up for chronic esophageal perforation complicated by  anastomotic leak and esophagocutaneous fistula.    PLAN  I spent a total of 15 minutes with Mrs. Minerva Blanco and Enrique, more than 50% of which were spent in counseling, coordination of care, and face-to-face time. I reviewed the plan as follows:  1) Follow-up with me in clinic in 2 weeks for wound check  2) Plastic Surgery consultation with Dr. Lester today.    All questions were answered and the patient and present family were in agreement with the plan.  I appreciate the opportunity to participate in the care of your patient and will keep you updated.  Sincerely,

## 2017-12-06 NOTE — NURSING NOTE
Oncology Rooming Note    December 6, 2017 9:30 AM   Minerva Blanco is a 72 year old female who presents for:    Chief Complaint   Patient presents with     Oncology Clinic Visit     Return for Discuss surgery      Initial Vitals: BP 97/43  Pulse 79  Temp 98.2  F (36.8  C) (Oral)  Resp 18  Wt 42.7 kg (94 lb 2.2 oz)  SpO2 97%  BMI 18.38 kg/m2 Estimated body mass index is 18.38 kg/(m^2) as calculated from the following:    Height as of 11/17/17: 1.524 m (5').    Weight as of this encounter: 42.7 kg (94 lb 2.2 oz). Body surface area is 1.34 meters squared.  No Pain (0) Comment: Data Unavailable   No LMP recorded. Patient is postmenopausal.  Allergies reviewed: Yes  Medications reviewed: Yes    Medications: Medication refills not needed today.  Pharmacy name entered into [employer]:    [id number] [place], [place] - [address] [facility] [place], [place] - [address]    Clinical concerns: rebel  Wise  was notified.    6 minutes for nursing intake (face to face time)     Jaky Martinez MA

## 2017-12-06 NOTE — Clinical Note
Please schedule pt for follow up with Dr. Wise for 2 weeks. The patient left, please call her. Thank you

## 2017-12-06 NOTE — MR AVS SNAPSHOT
After Visit Summary   12/6/2017    Minerva Blanco    MRN: [id number]           Patient Information     Date Of Birth          1945        Visit Information        Provider Department      12/6/2017 9:30 AM Jens Wise MD [facility] [place]        Today's Diagnoses     Esophagectomy, anastomotic leak    -  1       Follow-ups after your visit        Follow-up notes from your care team     Return in about 2 weeks (around 12/20/2017).      Future tests that were ordered for you today     Open Future Orders        Priority Expected Expires Ordered    Albumin level Routine  3/6/2018 12/6/2017            Who to contact     If you have questions or need follow up information about today's clinic visit or your schedule please contact [facility] directly at [phone].  Normal or non-critical lab and imaging results will be communicated to you by MyChart, letter or phone within 4 business days after the clinic has received the results. If you do not hear from us within 7 days, please contact the clinic through MyChart or phone. If you have a critical or abnormal lab result, we will notify you by phone as soon as possible.  Submit refill requests through [employer] or call your pharmacy and they will forward the refill request to us. Please allow 3 business days for your refill to be completed.          Additional Information About Your Visit        MyChart Information     [employer] gives you secure access to your electronic health record. If you see a primary care provider, you can also send messages to your care team and make appointments. If you have questions, please call your primary care clinic.  If you do not have a primary care provider, please call [phone] and they will assist you.        Care EveryWhere ID     This is your Care EveryWhere ID. This could be used by other organizations to access your [place] medical records  [id number]        Your  Vitals Were     Pulse Temperature Respirations Pulse Oximetry BMI (Body Mass Index)       79 98.2  F (36.8  C) (Oral) 18 97% 18.38 kg/m2        Blood Pressure from Last 3 Encounters:   12/06/17 97/43   12/06/17 97/43   11/22/17 117/74    Weight from Last 3 Encounters:   12/06/17 44 kg (97 lb)   12/06/17 42.7 kg (94 lb 2.2 oz)   11/22/17 41.8 kg (92 lb 2.4 oz)              Today, you had the following     No orders found for display         Today's Medication Changes          These changes are accurate as of: 12/6/17 12:32 PM.  If you have any questions, ask your nurse or doctor.               These medicines have changed or have updated prescriptions.        Dose/Directions    loperamide 1 MG/5ML liquid   Commonly known as:  IMODIUM   This may have changed:  when to take this   Used for:  Bowel habit changes        Dose:  4 mg   Take 20 mLs (4 mg) by mouth 4 times daily as needed for diarrhea   Quantity:  200 mL   Refills:  0       pantoprazole 40 MG EC tablet   Commonly known as:  PROTONIX   This may have changed:    - how much to take  - how to take this  - when to take this  - additional instructions   Used for:  Gastroesophageal reflux disease, esophagitis presence not specified        Take by mouth 30-60 minutes before a meal.   Quantity:  30 tablet   Refills:  0       traZODone 100 MG tablet   Commonly known as:  DESYREL   This may have changed:  how much to take   Used for:  Insomnia, unspecified type        Dose:  50 mg   0.5 tablets (50 mg) by Per G Tube route At Bedtime   Quantity:  30 tablet   Refills:  0                Primary Care Provider Office Phone # Fax #    Andrea Nino [phone]       [facility] [address] [address]        Equal Access to Services     DIANNA JORGENSEN AH: Dora Meng, isis romero, florentin palomo. So [facility] [phone].    ATENCIÓN: Si jareth rojo, satish ramos jackson  disposición servicios gratuitos de asistencia lingüística. Sreekanth tee [phone].    We comply with applicable federal civil rights laws and [place] laws. We do not discriminate on the basis of race, color, national origin, age, disability, sex, sexual orientation, or gender identity.            Thank you!     Thank you for choosing [facility]  for your care. Our goal is always to provide you with excellent care. Hearing back from our patients is one way we can continue to improve our services. Please take a few minutes to complete the written survey that you may receive in the mail after your visit with us. Thank you!             Your Updated Medication List - Protect others around you: Learn how to safely use, store and throw away your medicines at www.disposemymeds.org.          This list is accurate as of: 12/6/17 12:32 PM.  Always use your most recent med list.                   Brand Name Dispense Instructions for use Diagnosis    acetaminophen 32 mg/mL solution    TYLENOL    300 mL    30.45 mLs (975 mg) by Per Feeding Tube route 3 times daily as needed    Esophageal perforation       BENADRYL PO      Take 25 mg by mouth nightly as needed        cholestyramine 4 G Packet    QUESTRAN     Take 1 packet by mouth daily        CULTURELLE PO      Take 1 tablet by mouth 2 times daily        diphenoxylate-atropine 2.5-0.025 MG per tablet    LOMOTIL    40 tablet    Take 1 tablet by mouth 3 times daily as needed    Esophageal anastomotic leak       ferrous sulfate 300 (60 FE) MG/5ML syrup     150 mL    5 mLs (300 mg) by Per J Tube route daily    Esophageal anastomotic leak       fiber modular packet     60 packet    1 packet by Per Feeding Tube route 3 times daily (with meals)    History of esophagectomy       hydrOXYzine 25 MG capsule    VISTARIL     Take 25 mg by mouth as needed        loperamide 1 MG/5ML liquid    IMODIUM    200 mL    Take 20 mLs (4 mg) by mouth 4 times daily as needed for  diarrhea    Bowel habit changes       MELATONIN PO      Take 5 mg by mouth At Bedtime        multivitamins with minerals Liqd liquid      Take 15 mLs by mouth daily        * order for DME     1 Device    Equipment being ordered:  [facility] Suction Machine-Intermittent Suction Canisters(2) Suction Canister Holders(2) Suction Connect Tube(2) 5 in 1 Connector(2) Bacteria Filter(2) [employer] Suction(2)  Treatment Diagnosis: Esophogeal Perforation, s/p esophagectomy    Hx of esophagectomy       * order for DME     1 Device    Equipment being ordered:  Suction Pump Suction Canister(2) Suction Tubing(2) Bacteria Filter(2) [employer]auer(4) Red Rubber Catheter(2)  Treatment Diagnosis: Esophogeal Perforation, s/p esophagectomy    Esophageal perforation       oxyCODONE IR 5 MG tablet    ROXICODONE    84 tablet    Take 1 tablet (5 mg) by mouth See Admin Instructions One tablet every 4-6 hours as needed for pain    Esophageal anastomotic leak       pantoprazole 40 MG EC tablet    PROTONIX    30 tablet    Take by mouth 30-60 minutes before a meal.    Gastroesophageal reflux disease, esophagitis presence not specified       TRAMADOL HCL PO      Take 25 mg by mouth every 6 hours as needed for moderate to severe pain        traZODone 100 MG tablet    DESYREL    30 tablet    0.5 tablets (50 mg) by Per G Tube route At Bedtime    Insomnia, unspecified type       UNABLE TO FIND      2 nell supplement 4 cans daily per g tube        * Notice:  This list has 2 medication(s) that are the same as other medications prescribed for you. Read the directions carefully, and ask your doctor or other care provider to review them with you.

## 2017-12-06 NOTE — NURSING NOTE
Chief Complaint   Patient presents with     Consult     new pt        Vitals:    12/06/17 1049   BP: 97/43   Pulse: 79   SpO2: 100%   Weight: 97 lb   Height: 5'       Body mass index is 18.94 kg/(m^2).      Elvis MARIE

## 2017-12-06 NOTE — LETTER
12/6/2017      RE: Minerva Blanco  [address]   [facility] [address]       THORACIC SURGERY FOLLOW UP VISIT    Dear Dr. Carroll,  I saw Mrs. Minerva Blanco in follow-up today. The clinical summary follows:    PREOP DIAGNOSIS   1.  Chronic esophageal perforation with mediastinal phlegmon.    2.  Status post fundoplication.        PROCEDURE   1/9/2017  1.  Esophagogastroscopy.    2.  Laparoscopic lysis of adhesions.    3.  Laparoscopic proximal gastrectomy and gastrostomy tube placement.    4.  Left thoracotomy with excision of mediastinal phlegmon and distal esophagectomy.    5.  Thoracic duct ligation.    6.  Right tube thoracostomy.    7.  Left esophageal spit fistula.    8.  Bronchoscopy.        6/9/2017  1. Esophagogastroscopy  2. Esophageal stent placement      7/17/2017  1. Esophagogastroduodenoscopy with stent exchange  2. Pharyngostomy tube revision      Multiple EGD, stent revision and cauterization of esophagocutaneous fistula (most recent: 11/17/2017)    11/18/17  IR jejunostomy tube change      PRIOR PROCEDURES  1) Multiple endoscopies and pharyngostomy tube placement x 2 (for drainage of paraesophageal cavity)  2) Esophageal stent placement, attempted placement of biliary stent into the paraesophageal cavity (7/22/2016)  3) Esophageal stent removal, placement of retrograde gastroesophageal tube (10/23/2016)  4) Toupet fundoplication (1/2016)    COMPLICATIONS  Thoracotomy wound infection requiring antibiotics  Persistent esophagocutaneous fistula    INTERVAL STUDIES  11/1/17 CT Chest - Esophagocutaneous fistula seen again on CT.      ETOH negative  TOB never  BMI18    SUBJECTIVE  Mrs. Blanco has not had any drainage from her cutaneous fistula. She has a 6-8 mm round area of granulation tissue that I treated with silver nitrate.    From a personal perspective, she is here with Enrique.    IMPRESSION (K91.89) Esophagectomy, anastomotic leak  (primary encounter diagnosis)    72 year-old female  S/P retrosternal gastric pull-up for chronic esophageal perforation complicated by anastomotic leak and esophagocutaneous fistula.    PLAN  I spent a total of 15 minutes with Mrs. Minerva Blanco and Enrique, more than 50% of which were spent in counseling, coordination of care, and face-to-face time. I reviewed the plan as follows:  1) Follow-up with me in clinic in 2 weeks for wound check  2) Plastic Surgery consultation with Dr. Lester today.    All questions were answered and the patient and present family were in agreement with the plan.  I appreciate the opportunity to participate in the care of your patient and will keep you updated.  Sincerely,        Jens Wise MD

## 2017-12-06 NOTE — MR AVS SNAPSHOT
After Visit Summary   12/6/2017    Minerva Blanco    MRN: [id number]           Patient Information     Date Of Birth          1945        Visit Information        Provider Department      12/6/2017 10:00 AM BARBARA Lester MD [facility] Plastic and Reconstructive Surgery        Today's Diagnoses     Esophageal fistula    -  1       Follow-ups after your visit        Follow-up notes from your care team     Return in about 4 weeks (around 1/3/2018).      Your next 10 appointments already scheduled     Dec 20, 2017  9:00 AM CST   (Arrive by 8:45 AM)   Return Visit with Jens Wise MD   [facility] ([facility])    [address] [place] [id number] [phone]              Future tests that were ordered for you today     Open Future Orders        Priority Expected Expires Ordered    Prealbumin Routine  12/6/2018 12/6/2017    Albumin level Routine  3/6/2018 12/6/2017            Who to contact     Please call your clinic at [phone] to:    Ask questions about your health    Make or cancel appointments    Discuss your medicines    Learn about your test results    Speak to your doctor   If you have compliments or concerns about an experience at your clinic, or if you wish to file a complaint, please contact [facility] Patient Relations at [phone] or email us at [email or web address]         Additional Information About Your Visit        MyChart Information     [email or web address] gives you secure access to your electronic health record. If you see a primary care provider, you can also send messages to your care team and make appointments. If you have questions, please call your primary care clinic.  If you do not have a primary care provider, please call [phone] and they will assist you.      FlyBridGe is an electronic gateway that provides easy, online access to your medical  records. With Fever, you can request a clinic appointment, read your test results, renew a prescription or communicate with your care team.     To access your existing account, please contact your [facility] or call [phone] for assistance.        Care EveryWhere ID     This is your Care EveryWhere ID. This could be used by other organizations to access your [place] medical records  [id number]        Your Vitals Were     Pulse Height Pulse Oximetry BMI (Body Mass Index)          79 5' 100% 18.94 kg/m2         Blood Pressure from Last 3 Encounters:   12/06/17 97/43   12/06/17 97/43   11/22/17 117/74    Weight from Last 3 Encounters:   12/06/17 97 lb   12/06/17 94 lb 2.2 oz   11/22/17 92 lb 2.4 oz                 Today's Medication Changes          These changes are accurate as of: 12/6/17 11:59 PM.  If you have any questions, ask your nurse or doctor.               These medicines have changed or have updated prescriptions.        Dose/Directions    loperamide 1 MG/5ML liquid   Commonly known as:  IMODIUM   This may have changed:  when to take this   Used for:  Bowel habit changes        Dose:  4 mg   Take 20 mLs (4 mg) by mouth 4 times daily as needed for diarrhea   Quantity:  200 mL   Refills:  0       pantoprazole 40 MG EC tablet   Commonly known as:  PROTONIX   This may have changed:    - how much to take  - how to take this  - when to take this  - additional instructions   Used for:  Gastroesophageal reflux disease, esophagitis presence not specified        Take by mouth 30-60 minutes before a meal.   Quantity:  30 tablet   Refills:  0       traZODone 100 MG tablet   Commonly known as:  DESYREL   This may have changed:  how much to take   Used for:  Insomnia, unspecified type        Dose:  50 mg   0.5 tablets (50 mg) by Per G Tube route At Bedtime   Quantity:  30 tablet   Refills:  0                Primary Care Provider Office Phone # Fax #    Andrea Nino MD  [phone]       [facility] [address]  [facility] [address]        Equal Access to Services     TOM JORGENSEN : Hadii aad ku hadingezhao Sotoali, dajuanda nasrinfredrickha, shielata kachapoda monica, florentin yennyin hayaan erikchavez watt laTerriravi yariel. So [facility] [phone].    ATENCIÓN: Si habla español, tiene a jackson disposición servicios gratuitos de asistencia lingüística. Llame al [phone].    We comply with applicable federal civil rights laws and [place] laws. We do not discriminate on the basis of race, color, national origin, age, disability, sex, sexual orientation, or gender identity.            Thank you!     Thank you for choosing [facility] PLASTIC AND RECONSTRUCTIVE SURGERY  for your care. Our goal is always to provide you with excellent care. Hearing back from our patients is one way we can continue to improve our services. Please take a few minutes to complete the written survey that you may receive in the mail after your visit with us. Thank you!             Your Updated Medication List - Protect others around you: Learn how to safely use, store and throw away your medicines at www.disposemymeds.org.          This list is accurate as of: 12/6/17 11:59 PM.  Always use your most recent med list.                   Brand Name Dispense Instructions for use Diagnosis    acetaminophen 32 mg/mL solution    TYLENOL    300 mL    30.45 mLs (975 mg) by Per Feeding Tube route 3 times daily as needed    Esophageal perforation       BENADRYL PO      Take 25 mg by mouth nightly as needed        cholestyramine 4 G Packet    QUESTRAN     Take 1 packet by mouth daily        CULTURELLE PO      Take 1 tablet by mouth 2 times daily        diphenoxylate-atropine 2.5-0.025 MG per tablet    LOMOTIL    40 tablet    Take 1 tablet by mouth 3 times daily as needed    Esophageal anastomotic leak       ferrous sulfate 300 (60 FE) MG/5ML syrup     150 mL    5 mLs (300 mg) by Per J Tube route daily    Esophageal anastomotic  leak       fiber modular packet     60 packet    1 packet by Per Feeding Tube route 3 times daily (with meals)    History of esophagectomy       hydrOXYzine 25 MG capsule    VISTARIL     Take 25 mg by mouth as needed        loperamide 1 MG/5ML liquid    IMODIUM    200 mL    Take 20 mLs (4 mg) by mouth 4 times daily as needed for diarrhea    Bowel habit changes       MELATONIN PO      Take 5 mg by mouth At Bedtime        multivitamins with minerals Liqd liquid      Take 15 mLs by mouth daily        * order for DME     1 Device    Equipment being ordered:  [facility] Suction Machine-Intermittent Suction Canisters(2) Suction Canister Holders(2) Suction Connect Tube(2) 5 in 1 Connector(2) Bacteria Filter(2) Yaunkauer Suction(2)  Treatment Diagnosis: Esophogeal Perforation, s/p esophagectomy    Hx of esophagectomy       * order for DME     1 Device    Equipment being ordered:  Suction Pump Suction Canister(2) Suction Tubing(2) Bacteria Filter(2) Yaunkauer(4) Red Rubber Catheter(2)  Treatment Diagnosis: Esophogeal Perforation, s/p esophagectomy    Esophageal perforation       oxyCODONE IR 5 MG tablet    ROXICODONE    84 tablet    Take 1 tablet (5 mg) by mouth See Admin Instructions One tablet every 4-6 hours as needed for pain    Esophageal anastomotic leak       pantoprazole 40 MG EC tablet    PROTONIX    30 tablet    Take by mouth 30-60 minutes before a meal.    Gastroesophageal reflux disease, esophagitis presence not specified       TRAMADOL HCL PO      Take 25 mg by mouth every 6 hours as needed for moderate to severe pain        traZODone 100 MG tablet    DESYREL    30 tablet    0.5 tablets (50 mg) by Per G Tube route At Bedtime    Insomnia, unspecified type       UNABLE TO FIND      2 nell supplement 4 cans daily per g tube        * Notice:  This list has 2 medication(s) that are the same as other medications prescribed for you. Read the directions carefully, and ask your doctor or other care provider to review them with  you.

## 2017-12-07 NOTE — PROGRESS NOTES
REFERRING PROVIDER:  Dr. Jens Wise, [facility] Thoracic Surgery.      PRESENTING COMPLAINT:  Consultation for esophageal cutaneous fistula.      HISTORY OF PRESENTING COMPLAINT:  Ms. Blanco is 72 years old.  Her history goes back to the beginning of 2017 when for a chronic esophageal perforation esophagectomy and partial gastrectomy with esophageal spit fistula was carried out.  In April of this year, a gastric pull-up procedure along with takedown of the fistula with a J-tube placement was carried out.  This unfortunately led to a leak and a fistula to the skin.  Despite a number of stenting procedures as well as conservative therapies the fistula has remained.  She has got a nonhealing chronic wound on the anterior right side chest and of course this goes all the way down to the esophagus.  The patient has been n.p.o., has been on tube feeds, has of course lost weight and is weak.  Her last albumin level was 2.9 and prealbumin 19 in 07/2017.  Last hemoglobin in 11/2017 was 11.  She has been referred to me for my recommendations regarding possible buttressing of the fistula repair as well as wound closure at the time of possible takedown of this fistula.      PAST MEDICAL HISTORY:  Nil.      PAST SURGICAL HISTORY:  As per HPI and shoulder decompression.      MEDICATIONS:  Lomotil, tramadol, oxycodone, Protonix.      ALLERGIES:  Amoxicillin, Ativan and morphine.      SOCIAL HISTORY:  She does not smoke.  Used to smoke about a pack a day for 15 years, quit 1998.  Does not drink alcohol.  Is retired.      REVIEW OF SYSTEMS:  Denies chest pain, shortness of breath, MI, CVA, DVT and PE.      PHYSICAL EXAMINATION: Vital signs stable.  She is afebrile, in no obvious distress. She has a BMI of 18 kg/m2.  On examination of her chest, she is quite thin.  She has a 4 x 4 cm area of irritated skin with an opening that goes down between the ribs just right of the midline in the third intercostal space area.  She has a firing  palpable pectoralis major muscle lateral to the wound.  She is relatively small breasted.  She has multiple scars in the upper abdomen from previous chest tubes.  She has no abdominal hernia.  She has a skin pinch thickness about 3 cm.      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  I had a long discussion with the patient and her  about her findings and explained to them that one option is to just follow this conservatively and allow this fistula to hopefully heal itself in over time, although it has been almost 9 months and it has not healed in completely.  The patient is extremely sick of this and wants this fixed once and for all and is inclined to proceeding with surgical correction rather than giving this more time.  If we do go down the surgical route, Dr. Wise will most likely need to take down the fistula, repair the fistula either primarily and then we will buttress that fistula with either a pectoralis major muscle flap versus a rectus flap versus an omental flap.  If, however, he cannot close it primarily, but needs a piece of skin in the flap to help close that fistula, then a pectoralis major musculocutaneous flap will be required.  I explained to the patient and her  the concept behind these flaps.  With regards to the soft tissue wound, most likely that area of skin and most likely the underlying bone will be removed and then closure can be done primarily.  All of this was discussed in detail.  All risks, benefits and alternatives of the potential procedure including pain, infection, bleeding, scarring, asymmetry, seromas, hematomas, wound breakdown, wound dehiscence, loss of the flaps, requirement of further surgeries, wound healing complications, injury to deeper structures like mediastinal structures, nerves, vessels, lung, DVT, PE, MI, CVA, pneumonia, renal failure and death were explained.  I will discuss her case  with Dr. Wise.  We will plan this sometime in January or February.  That will give time for the patient's conservative treatment therapy to see which way it will go and then ultimately we will proceed if this stalls.  She was okay with this plan.  In the interim, we will have her follow up with her primary care to get cardiac and pulmonary clearance as well as we will recheck her prealbumin and albumin levels.  I reiterated the importance of a high protein diet.  All questions were answered.  They were happy with the visit.  All exam was done in the presence of my nurse.  Total time spent with the patient was 40 minutes, more than half was counseling.      cc:   Jens Wise MD   [facility]    [address]   [place], [place]  [id number] [place] [address] ADONIS WAHL MD             D: 2017 17:10   T: 2017 20:10   MT: LAZARUS      Name:     FAVIAN MALONE   MRN:      [id number]        Account:      [id number]   :      1945           Service Date: 2017      Document: [id number]

## 2017-12-13 ENCOUNTER — HOSPITAL ENCOUNTER (OUTPATIENT)
Dept: PALLIATIVE MEDICINE | Facility: OTHER | Age: 72
Discharge: HOME | End: 2017-12-13
Attending: NURSE PRACTITIONER

## 2017-12-13 DIAGNOSIS — G89.4 CHRONIC PAIN SYNDROME: ICD-10-CM

## 2017-12-13 ASSESSMENT — MIFFLIN-ST. JEOR: SCORE: 883.7

## 2017-12-20 ENCOUNTER — HOME INFUSION (PRE-WILLOW HOME INFUSION) (OUTPATIENT)
Dept: PHARMACY | Facility: CLINIC | Age: 72
End: 2017-12-20

## 2017-12-20 ENCOUNTER — OFFICE VISIT (OUTPATIENT)
Dept: SURGERY | Facility: CLINIC | Age: 72
End: 2017-12-20
Attending: THORACIC SURGERY (CARDIOTHORACIC VASCULAR SURGERY)
Payer: MEDICARE

## 2017-12-20 VITALS
OXYGEN SATURATION: 97 % | DIASTOLIC BLOOD PRESSURE: 65 MMHG | TEMPERATURE: 98.3 F | HEART RATE: 86 BPM | BODY MASS INDEX: 18.55 KG/M2 | SYSTOLIC BLOOD PRESSURE: 109 MMHG | WEIGHT: 95 LBS | RESPIRATION RATE: 14 BRPM

## 2017-12-20 DIAGNOSIS — K91.89 ANASTOMOTIC LEAK FOLLOWING ESOPHAGECTOMY: Primary | ICD-10-CM

## 2017-12-20 DIAGNOSIS — K22.89: ICD-10-CM

## 2017-12-20 DIAGNOSIS — K22.89 ESOPHAGEAL FISTULA: ICD-10-CM

## 2017-12-20 DIAGNOSIS — I48.0 PAROXYSMAL ATRIAL FIBRILLATION (H): ICD-10-CM

## 2017-12-20 LAB
ALBUMIN SERPL-MCNC: 3.4 G/DL (ref 3.4–5)
INR PPP: 0.99 (ref 0.86–1.14)
PREALB SERPL IA-MCNC: 15 MG/DL (ref 15–45)

## 2017-12-20 PROCEDURE — 40000114 ZZH STATISTIC NO CHARGE CLINIC VISIT

## 2017-12-20 PROCEDURE — 99212 OFFICE O/P EST SF 10 MIN: CPT | Mod: ZF

## 2017-12-20 RX ORDER — BUPRENORPHINE 10 UG/H
1 PATCH TRANSDERMAL WEEKLY
COMMUNITY
Start: 2017-12-13 | End: 2018-02-14

## 2017-12-20 RX ORDER — HYOSCYAMINE SULFATE 0.125 MG
TABLET ORAL
COMMUNITY
End: 2018-01-31 | Stop reason: ALTCHOICE

## 2017-12-20 ASSESSMENT — PAIN SCALES - GENERAL: PAINLEVEL: MODERATE PAIN (5)

## 2017-12-20 NOTE — MR AVS SNAPSHOT
After Visit Summary   12/20/2017    Minerva Blanco    MRN: [id number]           Patient Information     Date Of Birth          1945        Visit Information        Provider Department      12/20/2017 9:00 AM Jens Wise MD [facility]        Today's Diagnoses     Anastomotic leak following esophagectomy    -  1    Esophagocutaneous fistula           Follow-ups after your visit        Follow-up notes from your care team     Return in about 2 weeks (around 1/3/2018).      Who to contact     If you have questions or need follow up information about today's clinic visit or your schedule please contact [facility] directly at [phone].  Normal or non-critical lab and imaging results will be communicated to you by MyChart, letter or phone within 4 business days after the clinic has received the results. If you do not hear from us within 7 days, please contact the clinic through TheWraphart or phone. If you have a critical or abnormal lab result, we will notify you by phone as soon as possible.  Submit refill requests through [employer] Forums or call your pharmacy and they will forward the refill request to us. Please allow 3 business days for your refill to be completed.          Additional Information About Your Visit        MyChart Information     [employer] Forums gives you secure access to your electronic health record. If you see a primary care provider, you can also send messages to your care team and make appointments. If you have questions, please call your primary care clinic.  If you do not have a primary care provider, please call [phone] and they will assist you.        Care EveryWhere ID     This is your Care EveryWhere ID. This could be used by other organizations to access your [place] medical records  [id number]        Your Vitals Were     Pulse Temperature Respirations Pulse Oximetry BMI (Body Mass Index)       86 98.3  F (36.8  C) (Oral) 14  97% 18.55 kg/m2        Blood Pressure from Last 3 Encounters:   12/20/17 109/65   12/06/17 97/43   12/06/17 97/43    Weight from Last 3 Encounters:   12/20/17 43.1 kg (95 lb)   12/06/17 44 kg (97 lb)   12/06/17 42.7 kg (94 lb 2.2 oz)              Today, you had the following     No orders found for display         Today's Medication Changes          These changes are accurate as of: 12/20/17 11:04 AM.  If you have any questions, ask your nurse or doctor.               These medicines have changed or have updated prescriptions.        Dose/Directions    loperamide 1 MG/5ML liquid   Commonly known as:  IMODIUM   This may have changed:  when to take this   Used for:  Bowel habit changes        Dose:  4 mg   Take 20 mLs (4 mg) by mouth 4 times daily as needed for diarrhea   Quantity:  200 mL   Refills:  0       pantoprazole 40 MG EC tablet   Commonly known as:  PROTONIX   This may have changed:    - how much to take  - how to take this  - when to take this  - additional instructions   Used for:  Gastroesophageal reflux disease, esophagitis presence not specified        Take by mouth 30-60 minutes before a meal.   Quantity:  30 tablet   Refills:  0       traZODone 100 MG tablet   Commonly known as:  DESYREL   This may have changed:  how much to take   Used for:  Insomnia, unspecified type        Dose:  50 mg   0.5 tablets (50 mg) by Per G Tube route At Bedtime   Quantity:  30 tablet   Refills:  0                Primary Care Provider Office Phone # Fax #    Andrea Nino [phone]       [place] [address]        Equal Access to Services     [facility]: Hadkaye Meng, waaxda luqadaha, qaybta kaalflorentin benedict. So [facility] [phone].    ATENCIÓN: Si habla español, tiene a jackson disposición servicios gratuitos de asistencia lingüística. Llame al [phone].    We comply with applicable federal civil rights  laws and [place] laws. We do not discriminate on the basis of race, color, national origin, age, disability, sex, sexual orientation, or gender identity.            Thank you!     Thank you for choosing [facility]  for your care. Our goal is always to provide you with excellent care. Hearing back from our patients is one way we can continue to improve our services. Please take a few minutes to complete the written survey that you may receive in the mail after your visit with us. Thank you!             Your Updated Medication List - Protect others around you: Learn how to safely use, store and throw away your medicines at www.disposemymeds.org.          This list is accurate as of: 12/20/17 11:04 AM.  Always use your most recent med list.                   Brand Name Dispense Instructions for use Diagnosis    acetaminophen 32 mg/mL solution    TYLENOL    300 mL    30.45 mLs (975 mg) by Per Feeding Tube route 3 times daily as needed    Esophageal perforation       BENADRYL PO      Take 25 mg by mouth nightly as needed        buprenorphine 10 MCG/HR WK patch    BUTRANS     1 patch        cholestyramine 4 G Packet    QUESTRAN     Take 1 packet by mouth daily        CULTURELLE PO      Take 1 tablet by mouth 2 times daily        diphenoxylate-atropine 2.5-0.025 MG per tablet    LOMOTIL    40 tablet    Take 1 tablet by mouth 3 times daily as needed    Esophageal anastomotic leak       ferrous sulfate 300 (60 FE) MG/5ML syrup     150 mL    5 mLs (300 mg) by Per J Tube route daily    Esophageal anastomotic leak       fiber modular packet     60 packet    1 packet by Per Feeding Tube route 3 times daily (with meals)    History of esophagectomy       hydrOXYzine 25 MG capsule    VISTARIL     Take 25 mg by mouth as needed        hyoscyamine 0.125 MG tablet    ANASPAZ/LEVSIN     Pt not sure of dose        loperamide 1 MG/5ML liquid    IMODIUM    200 mL    Take 20 mLs (4 mg) by mouth 4 times daily as needed  for diarrhea    Bowel habit changes       MELATONIN PO      Take 5 mg by mouth At Bedtime        multivitamins with minerals Liqd liquid      Take 15 mLs by mouth daily        * order for DME     1 Device    Equipment being ordered:  [facility] Suction Machine-Intermittent Suction Canisters(2) Suction Canister Holders(2) Suction Connect Tube(2) 5 in 1 Connector(2) Bacteria Filter(2) Yaunkauer Suction(2)  Treatment Diagnosis: Esophogeal Perforation, s/p esophagectomy    Hx of esophagectomy       * order for DME     1 Device    Equipment being ordered:  Suction Pump Suction Canister(2) Suction Tubing(2) Bacteria Filter(2) Yaunkauer(4) Red Rubber Catheter(2)  Treatment Diagnosis: Esophogeal Perforation, s/p esophagectomy    Esophageal perforation       oxyCODONE IR 5 MG tablet    ROXICODONE    84 tablet    Take 1 tablet (5 mg) by mouth See Admin Instructions One tablet every 4-6 hours as needed for pain    Esophageal anastomotic leak       pantoprazole 40 MG EC tablet    PROTONIX    30 tablet    Take by mouth 30-60 minutes before a meal.    Gastroesophageal reflux disease, esophagitis presence not specified       TRAMADOL HCL PO      Take 25 mg by mouth every 6 hours as needed for moderate to severe pain        traZODone 100 MG tablet    DESYREL    30 tablet    0.5 tablets (50 mg) by Per G Tube route At Bedtime    Insomnia, unspecified type       UNABLE TO FIND      2 nell supplement 4 cans daily per g tube        * Notice:  This list has 2 medication(s) that are the same as other medications prescribed for you. Read the directions carefully, and ask your doctor or other care provider to review them with you.

## 2017-12-20 NOTE — PROGRESS NOTES
THORACIC SURGERY FOLLOW UP VISIT    Dear Dr. Carroll,  I saw Ms. Blanco in follow-up today. The clinical summary follows:     PREOP DIAGNOSIS   1. Chronic esophageal perforation with mediastinal phlegmon  2. Status post fundoplication    PROCEDURE   1/9/2017  1.  Esophagogastroscopy.   2.  Laparoscopic lysis of adhesions.  3.  Laparoscopic proximal gastrectomy and gastrostomy tube placement.    4.  Left thoracotomy with excision of mediastinal phlegmon and distal esophagectomy.    5.  Thoracic duct ligation.    6.  Right tube thoracostomy.    7.  Left esophageal spit fistula.    8.  Bronchoscopy.    6/9/2017  1. Esophagogastroscopy  2. Esophageal stent placement      7/17/2017  1. Esophagogastroduodenoscopy with stent exchange  2. Pharyngostomy tube revision      Multiple EGD, stent revision and cauterization of esophagocutaneous fistula (most recent: 11/17/2017)     11/18/17  IR jejunostomy tube change        PRIOR PROCEDURES  1) Multiple endoscopies and pharyngostomy tube placement x 2 (for drainage of paraesophageal cavity)  2) Esophageal stent placement, attempted placement of biliary stent into the paraesophageal cavity (7/22/2016)  3) Esophageal stent removal, placement of retrograde gastroesophageal tube (10/23/2016)  4) Toupet fundoplication (1/2016)      COMPLICATIONS  Thoracotomy wound infection requiring antibiotics  Persistent esophagocutaneous fistula    INTERVAL STUDIES  Lab 12/020/2017  Prealbumin 15  Albumin 3.4    ETOH negative  TOB never smoker  BMI 18.9    SUBJECTIVE   Ms. Blanco returns for follow up on her esophagocutaneous fistula. She feels that there is increased output compared to last time we saw her. She has not noticed any bubbling however. She met Dr Valdez 2 weeks ago and he is planning surgery unless the fistula heals up before then.    From a personal perspective, she is here alone today.     IMPRESSION (K91.89) Anastomotic leak following esophagectomy  (primary encounter  diagnosis)  (K22.8) Esophagocutaneous fistula    72 year-old female S/P retrosternal gastric pull-up for chronic esophageal perforation complicated by anastomotic leak and esophagocutaneous fistula.     PLAN  I spent a total of 15 minutes with Ms. Minerva Blanco, more than 50% of which were spent in counseling, coordination of care, and face-to-face time. I reviewed the plan as follows:  We are working on scheduling her for a repair with myocutaneous flap in January or February in conjunction with Dr. Lester.   All questions were answered and the patient and present family were in agreement with the plan.  I appreciate the opportunity to participate in the care of your patient and will keep you updated.  Sincerely,

## 2017-12-20 NOTE — LETTER
12/20/2017      RE: Minerva Blanco  [address]   [facility] [address]       THORACIC SURGERY FOLLOW UP VISIT    Dear Dr. Carroll,  I saw Ms. Blanco in follow-up today. The clinical summary follows:     PREOP DIAGNOSIS   1. Chronic esophageal perforation with mediastinal phlegmon  2. Status post fundoplication    PROCEDURE   1/9/2017  1.  Esophagogastroscopy.   2.  Laparoscopic lysis of adhesions.  3.  Laparoscopic proximal gastrectomy and gastrostomy tube placement.    4.  Left thoracotomy with excision of mediastinal phlegmon and distal esophagectomy.    5.  Thoracic duct ligation.    6.  Right tube thoracostomy.    7.  Left esophageal spit fistula.    8.  Bronchoscopy.    6/9/2017  1. Esophagogastroscopy  2. Esophageal stent placement      7/17/2017  1. Esophagogastroduodenoscopy with stent exchange  2. Pharyngostomy tube revision      Multiple EGD, stent revision and cauterization of esophagocutaneous fistula (most recent: 11/17/2017)     11/18/17  IR jejunostomy tube change        PRIOR PROCEDURES  1) Multiple endoscopies and pharyngostomy tube placement x 2 (for drainage of paraesophageal cavity)  2) Esophageal stent placement, attempted placement of biliary stent into the paraesophageal cavity (7/22/2016)  3) Esophageal stent removal, placement of retrograde gastroesophageal tube (10/23/2016)  4) Toupet fundoplication (1/2016)      COMPLICATIONS  Thoracotomy wound infection requiring antibiotics  Persistent esophagocutaneous fistula    INTERVAL STUDIES  Lab 12/020/2017  Prealbumin 15  Albumin 3.4    ETOH negative  TOB never smoker  BMI 18.9    SUBJECTIVE   Ms. Blanco returns for follow up on her esophagocutaneous fistula. She feels that there is increased output compared to last time we saw her. She has not noticed any bubbling however. She met Dr Valdez 2 weeks ago and he is planning surgery unless the fistula heals up before then.    From a personal perspective, she is here alone today.      IMPRESSION (K91.89) Anastomotic leak following esophagectomy  (primary encounter diagnosis)  (K22.8) Esophagocutaneous fistula    72 year-old female S/P retrosternal gastric pull-up for chronic esophageal perforation complicated by anastomotic leak and esophagocutaneous fistula.     PLAN  I spent a total of 15 minutes with Ms. Minerva JIMENEZ Francis, more than 50% of which were spent in counseling, coordination of care, and face-to-face time. I reviewed the plan as follows:  We are working on scheduling her for a repair with myocutaneous flap in January or February in conjunction with Dr. Lester.   All questions were answered and the patient and present family were in agreement with the plan.  I appreciate the opportunity to participate in the care of your patient and will keep you updated.  Sincerely,    Jens Wise MD

## 2017-12-21 NOTE — PROGRESS NOTES
This is a recent snapshot of the patient's [facility] medical record.  For current drug dose and complete information and questions, call [phone] or In [facility] pool, fv home infusion ([id number])  CSN Number:  [id number]

## 2017-12-22 ENCOUNTER — TELEPHONE (OUTPATIENT)
Dept: SURGERY | Facility: CLINIC | Age: 72
End: 2017-12-22

## 2017-12-28 ENCOUNTER — COMMUNICATION - HEALTHEAST (OUTPATIENT)
Dept: PALLIATIVE MEDICINE | Facility: CLINIC | Age: 72
End: 2017-12-28

## 2018-01-04 ENCOUNTER — COMMUNICATION - HEALTHEAST (OUTPATIENT)
Dept: PALLIATIVE MEDICINE | Facility: CLINIC | Age: 73
End: 2018-01-04

## 2018-01-04 DIAGNOSIS — Z98.890 HISTORY OF ESOPHAGECTOMY: Primary | ICD-10-CM

## 2018-01-04 DIAGNOSIS — Z90.49 HISTORY OF ESOPHAGECTOMY: Primary | ICD-10-CM

## 2018-01-04 DIAGNOSIS — G89.4 CHRONIC PAIN SYNDROME: ICD-10-CM

## 2018-01-05 ENCOUNTER — TELEPHONE (OUTPATIENT)
Dept: INTERVENTIONAL RADIOLOGY/VASCULAR | Facility: CLINIC | Age: 73
End: 2018-01-05

## 2018-01-08 ENCOUNTER — APPOINTMENT (OUTPATIENT)
Dept: INTERVENTIONAL RADIOLOGY/VASCULAR | Facility: CLINIC | Age: 73
End: 2018-01-08
Attending: CLINICAL NURSE SPECIALIST
Payer: MEDICARE

## 2018-01-08 ENCOUNTER — HOSPITAL ENCOUNTER (OUTPATIENT)
Facility: CLINIC | Age: 73
Discharge: HOME | End: 2018-01-08
Attending: THORACIC SURGERY (CARDIOTHORACIC VASCULAR SURGERY) | Admitting: THORACIC SURGERY (CARDIOTHORACIC VASCULAR SURGERY)
Payer: MEDICARE

## 2018-01-08 ENCOUNTER — COMMUNICATION - HEALTHEAST (OUTPATIENT)
Dept: PALLIATIVE MEDICINE | Facility: OTHER | Age: 73
End: 2018-01-08

## 2018-01-08 VITALS
SYSTOLIC BLOOD PRESSURE: 114 MMHG | DIASTOLIC BLOOD PRESSURE: 70 MMHG | OXYGEN SATURATION: 96 % | RESPIRATION RATE: 16 BRPM | HEART RATE: 86 BPM

## 2018-01-08 DIAGNOSIS — G89.4 CHRONIC PAIN SYNDROME: ICD-10-CM

## 2018-01-08 DIAGNOSIS — Z90.49 HISTORY OF ESOPHAGECTOMY: ICD-10-CM

## 2018-01-08 DIAGNOSIS — Z98.890 HISTORY OF ESOPHAGECTOMY: ICD-10-CM

## 2018-01-08 PROCEDURE — C1769 GUIDE WIRE: HCPCS

## 2018-01-08 PROCEDURE — A9270 NON-COVERED ITEM OR SERVICE: HCPCS | Performed by: PHYSICIAN ASSISTANT

## 2018-01-08 PROCEDURE — 49451 REPLACE DUOD/JEJ TUBE PERC: CPT

## 2018-01-08 PROCEDURE — C1887 CATHETER, GUIDING: HCPCS

## 2018-01-08 PROCEDURE — 25000125 ZZHC RX 250: Performed by: PHYSICIAN ASSISTANT

## 2018-01-08 PROCEDURE — 27211065 ZZ H TUBE GASTRO CR10

## 2018-01-08 RX ADMIN — LIDOCAINE HYDROCHLORIDE 10 ML: 20 JELLY TOPICAL at 09:50

## 2018-01-08 RX ADMIN — SILVER NITRATE APPLICATORS 2 APPLICATOR: 25; 75 STICK TOPICAL at 09:51

## 2018-01-08 NOTE — PROCEDURES
Interventional Radiology Brief Post Procedure Note    Procedure: IR JEJUNOSTOMY TUBE CHANGE    Proceduralist: Rudy Burdick PA-C    Assistant: None    Time Out: Prior to the start of the procedure and with procedural staff participation, I verbally confirmed the patient s identity using two indicators, relevant allergies, that the procedure was appropriate and matched the consent or emergent situation, and that the correct equipment/implants were available. Immediately prior to starting the procedure I conducted the Time Out with the procedural staff and re-confirmed the patient s name, procedure, and site/side. (The Joint Commission universal protocol was followed.)  Yes    Medications   Medication Event Details Admin User Admin Time   lidocaine 2 % (URO-JET) jelly 1-10 mL Medication Given by Other Dose: 10 mL; Route: Urethral; Scheduled Time:  9:50 AM; Comment: Walter Guerra PA-C, RN 1/8/2018  9:50 AM   silver nitrate (ARZOL) Misc Medication Given by Other Dose: 2 applicator; Route: Topical; Comment: Walter Guerra PA-C, RN 1/8/2018  9:51 AM   iopamidol (ISOVUE-250) 51% solution 50 mL Medication Given Dose: 10 mL; Route: Tube; Scheduled Time:  9:15 AM; Comment: Wasted 40mL Richard Blanchard ARRT 1/8/2018  9:52 AM       Sedation: None. Local Anesthestic used    Findings: Completed fluoroscopy-guided replacement of 16 Bermudian 30 cm jejunostomy tube after the tube fell out last night. Tube is ready for immediate use. Silver nitrate was used to treat granulation tissue around the tube.       Estimated Blood Loss: Minimal    Fluoroscopy Time:  less than 5 minute(s)    SPECIMENS: None    Complications: 1. None     Condition: Stable    Plan: Follow-up per primary team.     Comments: See dictated procedure note for full details.    Rudy Burdick PA-C

## 2018-01-08 NOTE — PROGRESS NOTES
Interventional Radiology Intra-procedural Nursing Note    Patient Name: Minerva Blanco  Medical Record Number: [id number]  Today's Date: January 8, 2018    Start Time: 0930  End of procedure time: 0950  Procedure: Jejunostomy Tube Replacement  Report given to: N/A, [facility] waiting room  Time pt departs: 0954  Proceduralist: Rudy Burdick PA-C    Other Notes:      Pt to IR Room #2 from [facility] Room.  Consent confirmed with patient; questions addressed.  Pt positioned supine and prepped on IR table by technologist.  Replaced 16 fr x 30 cm SEAN Jejunostomy tube under fluoroscopy, Rudy Burdick PA-C approved for immediate use of tube. Pt tolerated procedure without apparent incident. Pt denies pain post-procedure.  No sedation. Urojet Lidocaine 1% only.    Walter Khan RN

## 2018-01-09 NOTE — PROGRESS NOTES
THORACIC SURGERY FOLLOW UP VISIT    Dear Dr. Carroll,  I saw Ms. Minerva Blanco in follow-up today. The clinical summary follows:     PREOP DIAGNOSIS   1. Paraesophageal cutaneous fistula post-retrosternal pull-up.    PROCEDURE   1/9/2017  1.  Esophagogastroscopy.   2.  Laparoscopic lysis of adhesions.  3.  Laparoscopic proximal gastrectomy and gastrostomy tube placement.    4.  Left thoracotomy with excision of mediastinal phlegmon and distal esophagectomy.    5.  Thoracic duct ligation.    6.  Right tube thoracostomy.    7.  Left esophageal spit fistula.    8.  Bronchoscopy.    6/9/2017  1. Esophagogastroscopy  2. Esophageal stent placement      7/17/2017  1. Esophagogastroduodenoscopy with stent exchange  2. Pharyngostomy tube revision      Multiple EGD, stent revision and cauterization of esophagocutaneous fistula (most recent: 11/17/2017)      11/18/17  IR jejunostomy tube change    PRIOR PROCEDURES  1) Multiple endoscopies and pharyngostomy tube placement x 2 (for drainage of paraesophageal cavity)  2) Esophageal stent placement, attempted placement of biliary stent into the paraesophageal cavity (7/22/2016)  3) Esophageal stent removal, placement of retrograde gastroesophageal tube (10/23/2016)  4) Toupet fundoplication (1/2016)    COMPLICATIONS  Thoracotomy wound infection requiring antibiotics  Persistent esophagocutaneous fistula    INTERVAL STUDIES  12/20/17 Labs: Albumin 3.4, Prealbumin 15. INR 0.9.  1/8/18 Jejunostomy tube replaced by IR (16 Fr)    ETOH negative  TOB never smoker  BMI 19    SUBJECTIVE  Ms Blanco is doing very well. She continues to have minimal drainage from the esophagocutaneous fistula. Denies any constitutional symptoms. The jejunostomy tube was replaced by IR recently and has been having some leakage. She is tolerating clears by mouth as usual and nocturnal tube feeds. Nutrition labs appear to be adequate in preparation for surgery this upcoming February.     From a personal  perspective, she comes to clinic by herself. She is anxious to have surgery and finally resolve the draining fistula.     IMPRESSION (K91.89) Anastomotic leak following esophagectomy  (primary encounter diagnosis)    72 year-old female S/P retrosternal gastric pull-up for chronic esophageal perforation complicated by anastomotic leak and esophagocutaneous fistula.      PLAN  I spent a total of 15 minutes with Ms. Minerva Blanco, more than 50% of which were spent in counseling, coordination of care, and face-to-face time. I reviewed the plan as follows:  OR 02/16/18 for esophagocutaneous fistula closure with pectoralis mayor myocutaneous flap. This will be a combined procedure with Dr. Lester from plastics.   Necessary Tests & Appointments:   PAC   Stent removal a few days before scheduled surgery  Pain Control Plan: local liposomal bupivacaine  Anticoagulation Plan: pneumoboots    All questions were answered and the patient and present family were in agreement with the plan.  I appreciate the opportunity to participate in the care of your patient and will keep you updated.  Sincerely,

## 2018-01-10 ENCOUNTER — OFFICE VISIT (OUTPATIENT)
Dept: SURGERY | Facility: CLINIC | Age: 73
End: 2018-01-10
Attending: THORACIC SURGERY (CARDIOTHORACIC VASCULAR SURGERY)
Payer: MEDICARE

## 2018-01-10 VITALS
DIASTOLIC BLOOD PRESSURE: 79 MMHG | WEIGHT: 93.9 LBS | OXYGEN SATURATION: 97 % | RESPIRATION RATE: 14 BRPM | HEART RATE: 80 BPM | SYSTOLIC BLOOD PRESSURE: 129 MMHG | TEMPERATURE: 98.3 F | BODY MASS INDEX: 18.34 KG/M2

## 2018-01-10 DIAGNOSIS — K91.89 ANASTOMOTIC LEAK FOLLOWING ESOPHAGECTOMY: Primary | ICD-10-CM

## 2018-01-10 PROCEDURE — G0463 HOSPITAL OUTPT CLINIC VISIT: HCPCS | Mod: ZF

## 2018-01-10 ASSESSMENT — PAIN SCALES - GENERAL: PAINLEVEL: MODERATE PAIN (5)

## 2018-01-10 NOTE — LETTER
1/10/2018      RE: Minerva Blanco  [address]   [place] [address]       THORACIC SURGERY FOLLOW UP VISIT    Dear Dr. Carroll,  I saw Ms. Minerva Blanco in follow-up today. The clinical summary follows:     PREOP DIAGNOSIS   1. Paraesophageal cutaneous fistula post-retrosternal pull-up.    PROCEDURE   1/9/2017  1.  Esophagogastroscopy.   2.  Laparoscopic lysis of adhesions.  3.  Laparoscopic proximal gastrectomy and gastrostomy tube placement.    4.  Left thoracotomy with excision of mediastinal phlegmon and distal esophagectomy.    5.  Thoracic duct ligation.    6.  Right tube thoracostomy.    7.  Left esophageal spit fistula.    8.  Bronchoscopy.    6/9/2017  1. Esophagogastroscopy  2. Esophageal stent placement      7/17/2017  1. Esophagogastroduodenoscopy with stent exchange  2. Pharyngostomy tube revision      Multiple EGD, stent revision and cauterization of esophagocutaneous fistula (most recent: 11/17/2017)      11/18/17  IR jejunostomy tube change    PRIOR PROCEDURES  1) Multiple endoscopies and pharyngostomy tube placement x 2 (for drainage of paraesophageal cavity)  2) Esophageal stent placement, attempted placement of biliary stent into the paraesophageal cavity (7/22/2016)  3) Esophageal stent removal, placement of retrograde gastroesophageal tube (10/23/2016)  4) Toupet fundoplication (1/2016)    COMPLICATIONS  Thoracotomy wound infection requiring antibiotics  Persistent esophagocutaneous fistula    INTERVAL STUDIES  12/20/17 Labs: Albumin 3.4, Prealbumin 15. INR 0.9.  1/8/18 Jejunostomy tube replaced by IR (16 Fr)    ETOH negative  TOB never smoker  BMI 19    SUBJECTIVE  Ms Blanco is doing very well. She continues to have minimal drainage from the esophagocutaneous fistula. Denies any constitutional symptoms. The jejunostomy tube was replaced by IR recently and has been having some leakage. She is tolerating clears by mouth as usual and nocturnal tube feeds. Nutrition labs  appear to be adequate in preparation for surgery this upcoming February.     From a personal perspective, she comes to clinic by herself. She is anxious to have surgery and finally resolve the draining fistula.     IMPRESSION (K91.89) Anastomotic leak following esophagectomy  (primary encounter diagnosis)    72 year-old female S/P retrosternal gastric pull-up for chronic esophageal perforation complicated by anastomotic leak and esophagocutaneous fistula.      PLAN  I spent a total of 15 minutes with Ms. Minerva BARBARA Blanco, more than 50% of which were spent in counseling, coordination of care, and face-to-face time. I reviewed the plan as follows:  OR 02/16/18 for esophagocutaneous fistula closure with pectoralis mayor myocutaneous flap. This will be a combined procedure with Dr. Lester from plastics.   Necessary Tests & Appointments:   PAC   Stent removal a few days before scheduled surgery  Pain Control Plan: local liposomal bupivacaine  Anticoagulation Plan: pneumoboots    All questions were answered and the patient and present family were in agreement with the plan.  I appreciate the opportunity to participate in the care of your patient and will keep you updated.  Sincerely,      Jens Wise MD

## 2018-01-10 NOTE — NURSING NOTE
Oncology Rooming Note    January 10, 2018 8:00 AM   Minerva Blanco is a 72 year old female who presents for:    Chief Complaint   Patient presents with     Oncology Clinic Visit     Esophageal stricture     Initial Vitals: /79  Pulse 80  Temp 98.3  F (36.8  C) (Oral)  Resp 14  Wt 42.6 kg (93 lb 14.4 oz)  SpO2 97%  BMI 18.34 kg/m2 Estimated body mass index is 18.34 kg/(m^2) as calculated from the following:    Height as of 12/6/17: 1.524 m (5').    Weight as of this encounter: 42.6 kg (93 lb 14.4 oz). Body surface area is 1.34 meters squared.  Moderate Pain (5) Comment: Data Unavailable   No LMP recorded. Patient is postmenopausal.  Allergies reviewed: Yes  Medications reviewed: Yes    Medications: Medication refills not needed today.  Pharmacy name entered into [employer] (powering [employer]): [id number] [place], [place] - [address]    Clinical concerns: Patient states there are no new concerns to discuss with provider.  Dr Wise was not notified.       6 minutes for nursing intake (face to face time)     Mary Horne CMA

## 2018-01-10 NOTE — MR AVS SNAPSHOT
After Visit Summary   1/10/2018    Minerva Blanco    MRN: [id number]           Patient Information     Date Of Birth          1945        Visit Information        Provider Department      1/10/2018 8:00 AM Jens Wise MD [facility]        Today's Diagnoses     Anastomotic leak following esophagectomy    -  1       Follow-ups after your visit        Your next 10 appointments already scheduled     Jan 31, 2018 10:45 AM CST   (Arrive by 10:30 AM)   Return Plastic Surgery with BARBARA Lester MD   [facility] Plastic and Reconstructive Surgery ([facility] [place])    [address] [place] [address]   [phone]           Do not wear perfume.            Jan 31, 2018 11:30 AM CST   (Arrive by 11:15 AM)   PAC EVALUATION with  Pac Sarah 6   [facility] Preoperative Assessment [place] ([facility])    [address] [place] [address]   [phone]            Jan 31, 2018 12:30 PM CST   (Arrive by 12:15 PM)   PAC RN ASSESSMENT with  Pac Rn   [facility] Preoperative [facility] [place] ([facility])    [address] [place] [address]   [phone]            Jan 31, 2018  1:00 PM CST   (Arrive by 12:45 PM)   PAC Anesthesia Consult with  Pac Anesthesiologist   [facility] Preoperative Assessment [place] ([facility])    [address] [place] [address]   [phone]            Feb 16, 2018   Procedure with Jens Wise MD   [facility] [place], Same Day Surgery (--)    [address] [facility] [address]   [phone]            Mar 07, 2018 10:45 AM CST   (Arrive by 10:30 AM)   Post-Op with BARBARA Lester MD   [facility] Plastic and Reconstructive Surgery ([facility])    [address] [place] [address]   [phone]               Who to contact     If you have questions or need follow up information about today's clinic visit or your schedule please contact [facility] directly at [phone].  Normal or non-critical lab and imaging results will be communicated to you by [employer], letter or phone within 4 business days after the clinic has received the results. If you do not hear from us within 7 days, please contact the clinic through [employer] or phone. If you have a critical or abnormal lab result, we will notify you by phone as soon as possible.  Submit refill requests through [employer] or call your pharmacy and they will forward the refill request to us. Please allow 3 business days for your refill to be completed.          Additional Information About Your Visit        [employer] Information     [employer] gives you secure access to your electronic health record. If you see a primary care provider, you can also send messages to your care team and make appointments. If you have questions, please call your primary care clinic.  If you do not have a primary care provider, please call [phone] and they will assist you.        Care EveryWhere ID     This is your Care EveryWhere ID. This could be used by other organizations to access your [place] medical records  [id number]        Your Vitals Were     Pulse Temperature Respirations Pulse Oximetry BMI (Body Mass Index)       80 98.3  F (36.8  C) (Oral) 14 97% 18.34 kg/m2        Blood Pressure from Last 3 Encounters:   01/10/18 129/79   01/08/18 114/70   12/20/17 109/65    Weight from Last 3 Encounters:   01/10/18 42.6 kg (93 lb 14.4 oz)   12/20/17 43.1 kg (95 lb)   12/06/17 44 kg (97 lb)              We Performed the Following     [facility]irley-Operative Worksheet (Thoracic)          Today's Medication Changes          These changes are accurate as of: 1/10/18  9:30 AM.  If you have any questions, ask your nurse or doctor.               These medicines have changed or have  updated prescriptions.        Dose/Directions    loperamide 1 MG/5ML liquid   Commonly known as:  IMODIUM   This may have changed:  when to take this   Used for:  Bowel habit changes        Dose:  4 mg   Take 20 mLs (4 mg) by mouth 4 times daily as needed for diarrhea   Quantity:  200 mL   Refills:  0       pantoprazole 40 MG EC tablet   Commonly known as:  PROTONIX   This may have changed:    - how much to take  - how to take this  - when to take this  - additional instructions   Used for:  Gastroesophageal reflux disease, esophagitis presence not specified        Take by mouth 30-60 minutes before a meal.   Quantity:  30 tablet   Refills:  0       traZODone 100 MG tablet   Commonly known as:  DESYREL   This may have changed:  how much to take   Used for:  Insomnia, unspecified type        Dose:  50 mg   0.5 tablets (50 mg) by Per G Tube route At Bedtime   Quantity:  30 tablet   Refills:  0                Primary Care Provider Office Phone # Fax #    Andrea Nino [phone]       [facility] [address] [address]        Equal Access to Services     [facility]: Hadii davion mckeon hadasho Soomaali, waaxda luqadaha, qaybta kaalmada adeegyada, florentin amaya . So [facility] [phone].    ATENCIÓN: Si habla español, tiene a jackson disposición servicios gratuitos de asistencia lingüística. [facility] [phone].    We comply with applicable federal civil rights laws and [place] laws. We do not discriminate on the basis of race, color, national origin, age, disability, sex, sexual orientation, or gender identity.            Thank you!     Thank you for choosing [facility]  for your care. Our goal is always to provide you with excellent care. Hearing back from our patients is one way we can continue to improve our services. Please take a few minutes to complete the written survey that you may receive in the mail after your visit with us.  Thank you!             Your Updated Medication List - Protect others around you: Learn how to safely use, store and throw away your medicines at www.disposemymeds.org.          This list is accurate as of: 1/10/18  9:30 AM.  Always use your most recent med list.                   Brand Name Dispense Instructions for use Diagnosis    acetaminophen 32 mg/mL solution    TYLENOL    300 mL    30.45 mLs (975 mg) by Per Feeding Tube route 3 times daily as needed    Esophageal perforation       BENADRYL PO      Take 25 mg by mouth nightly as needed        buprenorphine 10 MCG/HR WK patch    BUTRANS     1 patch        cholestyramine 4 G Packet    QUESTRAN     Take 1 packet by mouth daily        CULTURELLE PO      Take 1 tablet by mouth 2 times daily        diphenoxylate-atropine 2.5-0.025 MG per tablet    LOMOTIL    40 tablet    Take 1 tablet by mouth 3 times daily as needed    Esophageal anastomotic leak       ferrous sulfate 300 (60 FE) MG/5ML syrup     150 mL    5 mLs (300 mg) by Per J Tube route daily    Esophageal anastomotic leak       fiber modular packet     60 packet    1 packet by Per Feeding Tube route 3 times daily (with meals)    History of esophagectomy       hydrOXYzine 25 MG capsule    VISTARIL     Take 25 mg by mouth as needed        hyoscyamine 0.125 MG tablet    ANASPAZ/LEVSIN     Pt not sure of dose        loperamide 1 MG/5ML liquid    IMODIUM    200 mL    Take 20 mLs (4 mg) by mouth 4 times daily as needed for diarrhea    Bowel habit changes       MELATONIN PO      Take 5 mg by mouth At Bedtime        multivitamins with minerals Liqd liquid      Take 15 mLs by mouth daily        * order for DME     1 Device    Equipment being ordered:  [facility] Suction Machine-Intermittent Suction Canisters(2) Suction Canister Holders(2) Suction Connect Tube(2) 5 in 1 Connector(2) Bacteria Filter(2) Yaunkauer Suction(2)  Treatment Diagnosis: Esophogeal Perforation, s/p esophagectomy    Hx of esophagectomy       * order for  DME     1 Device    Equipment being ordered:  Suction Pump Suction Canister(2) Suction Tubing(2) Bacteria Filter(2) Yaunkauer(4) Red Rubber Catheter(2)  Treatment Diagnosis: Esophogeal Perforation, s/p esophagectomy    Esophageal perforation       oxyCODONE IR 5 MG tablet    ROXICODONE    84 tablet    Take 1 tablet (5 mg) by mouth See Admin Instructions One tablet every 4-6 hours as needed for pain    Esophageal anastomotic leak       pantoprazole 40 MG EC tablet    PROTONIX    30 tablet    Take by mouth 30-60 minutes before a meal.    Gastroesophageal reflux disease, esophagitis presence not specified       TRAMADOL HCL PO      Take 25 mg by mouth every 6 hours as needed for moderate to severe pain        traZODone 100 MG tablet    DESYREL    30 tablet    0.5 tablets (50 mg) by Per G Tube route At Bedtime    Insomnia, unspecified type       UNABLE TO FIND      2 nell supplement 4 cans daily per g tube        * Notice:  This list has 2 medication(s) that are the same as other medications prescribed for you. Read the directions carefully, and ask your doctor or other care provider to review them with you.

## 2018-01-12 DIAGNOSIS — Z97.8 USES FEEDING TUBE: Primary | ICD-10-CM

## 2018-01-16 ENCOUNTER — HOSPITAL ENCOUNTER (OUTPATIENT)
Facility: CLINIC | Age: 73
End: 2018-01-16
Attending: RADIOLOGY | Admitting: RADIOLOGY

## 2018-01-17 ENCOUNTER — TELEPHONE (OUTPATIENT)
Dept: INTERVENTIONAL RADIOLOGY/VASCULAR | Facility: CLINIC | Age: 73
End: 2018-01-17

## 2018-01-25 ENCOUNTER — HOSPITAL ENCOUNTER (OUTPATIENT)
Dept: PALLIATIVE MEDICINE | Facility: OTHER | Age: 73
Discharge: HOME | End: 2018-01-25
Attending: NURSE PRACTITIONER

## 2018-01-25 DIAGNOSIS — G89.29 CHRONIC INTRACTABLE PAIN: ICD-10-CM

## 2018-01-25 DIAGNOSIS — G89.4 CHRONIC PAIN SYNDROME: ICD-10-CM

## 2018-01-25 ASSESSMENT — MIFFLIN-ST. JEOR: SCORE: 883.7

## 2018-01-31 ENCOUNTER — ANESTHESIA EVENT (OUTPATIENT)
Dept: SURGERY | Facility: CLINIC | Age: 73
End: 2018-01-31
Payer: MEDICARE

## 2018-01-31 ENCOUNTER — OFFICE VISIT (OUTPATIENT)
Dept: SURGERY | Facility: CLINIC | Age: 73
End: 2018-01-31
Payer: COMMERCIAL

## 2018-01-31 ENCOUNTER — OFFICE VISIT (OUTPATIENT)
Dept: PLASTIC SURGERY | Facility: CLINIC | Age: 73
End: 2018-01-31
Payer: COMMERCIAL

## 2018-01-31 ENCOUNTER — APPOINTMENT (OUTPATIENT)
Dept: SURGERY | Facility: CLINIC | Age: 73
End: 2018-01-31
Payer: COMMERCIAL

## 2018-01-31 ENCOUNTER — ANESTHESIA EVENT (OUTPATIENT)
Dept: SURGERY | Facility: CLINIC | Age: 73
End: 2018-01-31

## 2018-01-31 ENCOUNTER — ALLIED HEALTH/NURSE VISIT (OUTPATIENT)
Dept: SURGERY | Facility: CLINIC | Age: 73
End: 2018-01-31
Payer: COMMERCIAL

## 2018-01-31 VITALS
WEIGHT: 93 LBS | OXYGEN SATURATION: 100 % | SYSTOLIC BLOOD PRESSURE: 129 MMHG | HEIGHT: 60 IN | BODY MASS INDEX: 18.26 KG/M2 | HEART RATE: 80 BPM | DIASTOLIC BLOOD PRESSURE: 79 MMHG

## 2018-01-31 VITALS
SYSTOLIC BLOOD PRESSURE: 136 MMHG | WEIGHT: 99 LBS | HEIGHT: 60 IN | OXYGEN SATURATION: 97 % | DIASTOLIC BLOOD PRESSURE: 79 MMHG | RESPIRATION RATE: 16 BRPM | HEART RATE: 91 BPM | TEMPERATURE: 98.3 F | BODY MASS INDEX: 19.44 KG/M2

## 2018-01-31 DIAGNOSIS — K22.3 ESOPHAGEAL PERFORATION: Primary | ICD-10-CM

## 2018-01-31 DIAGNOSIS — K22.89 ESOPHAGEAL FISTULA: Primary | ICD-10-CM

## 2018-01-31 DIAGNOSIS — Z01.818 PRE-OPERATIVE GENERAL PHYSICAL EXAMINATION: ICD-10-CM

## 2018-01-31 DIAGNOSIS — R89.9 ABNORMAL LABORATORY TEST: ICD-10-CM

## 2018-01-31 DIAGNOSIS — K22.89 ESOPHAGEAL FISTULA: ICD-10-CM

## 2018-01-31 DIAGNOSIS — Z01.818 PREOP EXAMINATION: Primary | ICD-10-CM

## 2018-01-31 LAB
ALBUMIN SERPL-MCNC: 3.7 G/DL (ref 3.4–5)
ALP SERPL-CCNC: 310 U/L (ref 40–150)
ALT SERPL W P-5'-P-CCNC: 62 U/L (ref 0–50)
ANION GAP SERPL CALCULATED.3IONS-SCNC: 4 MMOL/L (ref 3–14)
AST SERPL W P-5'-P-CCNC: 58 U/L (ref 0–45)
BILIRUB SERPL-MCNC: 0.4 MG/DL (ref 0.2–1.3)
BUN SERPL-MCNC: 17 MG/DL (ref 7–30)
CALCIUM SERPL-MCNC: 9 MG/DL (ref 8.5–10.1)
CHLORIDE SERPL-SCNC: 99 MMOL/L (ref 94–109)
CO2 SERPL-SCNC: 31 MMOL/L (ref 20–32)
CREAT SERPL-MCNC: 0.46 MG/DL (ref 0.52–1.04)
ERYTHROCYTE [DISTWIDTH] IN BLOOD BY AUTOMATED COUNT: 14.9 % (ref 10–15)
GFR SERPL CREATININE-BSD FRML MDRD: >90 ML/MIN/1.7M2
GLUCOSE SERPL-MCNC: 92 MG/DL (ref 70–99)
HCT VFR BLD AUTO: 38.9 % (ref 35–47)
HGB BLD-MCNC: 11.8 G/DL (ref 11.7–15.7)
MCH RBC QN AUTO: 28.3 PG (ref 26.5–33)
MCHC RBC AUTO-ENTMCNC: 30.3 G/DL (ref 31.5–36.5)
MCV RBC AUTO: 93 FL (ref 78–100)
PLATELET # BLD AUTO: 361 10E9/L (ref 150–450)
POTASSIUM SERPL-SCNC: 3.8 MMOL/L (ref 3.4–5.3)
PROT SERPL-MCNC: 9.3 G/DL (ref 6.8–8.8)
RBC # BLD AUTO: 4.17 10E12/L (ref 3.8–5.2)
SODIUM SERPL-SCNC: 134 MMOL/L (ref 133–144)
WBC # BLD AUTO: 10.4 10E9/L (ref 4–11)

## 2018-01-31 RX ORDER — GABAPENTIN 400 MG/1
400 CAPSULE ORAL 2 TIMES DAILY
COMMUNITY
Start: 2018-01-25 | End: 2018-02-24

## 2018-01-31 ASSESSMENT — PAIN SCALES - GENERAL: PAINLEVEL: NO PAIN (0)

## 2018-01-31 ASSESSMENT — LIFESTYLE VARIABLES: TOBACCO_USE: 1

## 2018-01-31 NOTE — PROGRESS NOTES
PRESENTING COMPLAINT:  Preoperative visit for upcoming esophageal fistula repair with possible pectoralis major flap on 02/16/2018.      HISTORY OF PRESENT ILLNESS:  Ms. Blanco is 72 years old, well-known to my service.  No change in her history and physical exam sputum.  She is scheduled for the upcoming above-named procedure.  She has been cleared to proceed and is seen in the PAC Clinic today.      ASSESSMENT AND PLAN:  Based on above findings, a diagnosis of an esophageal cutaneous fistula requiring exploration, repair and a muscle flap scheduled for 02/16/2018 was made.  Had a long discussion with the patient and her  once again about the planned procedure.  Was very clear that I would undertake most likely a pectoralis major muscle or musculocutaneous flap to help buttress the fistula repair, but I may end up needing a VRAM or an omental flap as well.  She understood that.  All risks, benefits and alternatives of the potential procedure including pain, infection, bleeding, scarring, asymmetry, seromas, hematomas, wound breakdown, wound dehiscence, loss of the flaps, requirement of further surgeries, injury to deeper structures like mediastinal structures, hemothorax, pneumothorax, bowel perforation, bleeding, seromas, hematomas, requirement of staged procedures, infectious complications, wound breakdown, DVT, PE, MI, CVA, pneumonia, renal failure and death were all explained.  She understood them.  Wants to proceed.  All questions were answered.  Look forward to helping her out in the near future.  All exam done in the presence of my nurse.

## 2018-01-31 NOTE — NURSING NOTE
Chief Complaint   Patient presents with     Pre-Op Exam     pre op        Vitals:    01/31/18 1105   BP: 129/79   Pulse: 80   SpO2: 100%   Weight: 93 lb   Height: 5'       Body mass index is 18.16 kg/(m^2).    Elvis MARIE

## 2018-01-31 NOTE — MR AVS SNAPSHOT
After Visit Summary   1/31/2018    Minerva Blanco    MRN: [id number]           Patient Information     Date Of Birth          1945        Visit Information        Provider Department      1/31/2018 11:00 AM PharmacistCasey [facility]        Today's Diagnoses     Preop examination    -  1       Follow-ups after your visit        Your next 10 appointments already scheduled     Feb 12, 2018   Procedure with Jens Wise MD   [facility], [place], Same Day Surgery (--)    [address] [facility] [facility] [address]   [phone]            Feb 16, 2018   Procedure with Jens Wise MD   [facility], [place], Same Day Surgery (--)    [address] [facility] [address]   [phone]            Mar 07, 2018 10:45 AM CST   (Arrive by 10:30 AM)   Post-Op with BARBARA Lester MD   [facility] ([facility])    [address]  [address] [facility] [address] [phone]              Who to contact     Please call your clinic at [phone] to:    Ask questions about your health    Make or cancel appointments    Discuss your medicines    Learn about your test results    Speak to your doctor   If you have compliments or concerns about an experience at your clinic, or if you wish to file a complaint, please contact [facility] Patient Relations at [phone] or email us at [email or web address]         Additional Information About Your Visit        [employer] Information     [employer] gives you secure access to your electronic health record. If you see a primary care provider, you can also send messages to your care team and make appointments. If you have questions, please call your primary care clinic.  If you do not have a primary care provider, please call [phone] and they will assist you.      [employer] is an electronic gateway that provides easy, online  access to your medical records. With [employer], you can request a clinic appointment, read your test results, renew a prescription or communicate with your care team.     To access your existing account, please contact your [facility] or call [phone] for assistance.        Care EveryWhere ID     This is your Care EveryWhere ID. This could be used by other organizations to access your [facility] medical records  [id number]         Blood Pressure from Last 3 Encounters:   01/31/18 136/79   01/31/18 129/79   01/10/18 129/79    Weight from Last 3 Encounters:   01/31/18 44.9 kg (99 lb)   01/31/18 42.2 kg (93 lb)   01/10/18 42.6 kg (93 lb 14.4 oz)              Today, you had the following     No orders found for display         Today's Medication Changes          These changes are accurate as of 1/31/18  2:02 PM.  If you have any questions, ask your nurse or doctor.               These medicines have changed or have updated prescriptions.        Dose/Directions    loperamide 1 MG/5ML liquid   Commonly known as:  IMODIUM   This may have changed:  when to take this   Used for:  Bowel habit changes        Dose:  4 mg   Take 20 mLs (4 mg) by mouth 4 times daily as needed for diarrhea   Quantity:  200 mL   Refills:  0       pantoprazole 40 MG EC tablet   Commonly known as:  PROTONIX   This may have changed:    - how much to take  - how to take this  - when to take this  - additional instructions   Used for:  Gastroesophageal reflux disease, esophagitis presence not specified        Take by mouth 30-60 minutes before a meal.   Quantity:  30 tablet   Refills:  0       traZODone 100 MG tablet   Commonly known as:  DESYREL   This may have changed:  how much to take   Used for:  Insomnia, unspecified type        Dose:  50 mg   0.5 tablets (50 mg) by Per G Tube route At Bedtime   Quantity:  30 tablet   Refills:  0         Stop taking these medicines if you haven't already. Please contact your care  team if you have questions.     diphenoxylate-atropine 2.5-0.025 MG per tablet   Commonly known as:  LOMOTIL   Stopped by:  Pharmacist, Uc Pac           fiber modular packet   Stopped by:  Pharmacist, Casey Beckford           hydrOXYzine 25 MG capsule   Commonly known as:  VISTARIL   Stopped by:  Pharmacist, Uc Pac           hyoscyamine 0.125 MG tablet   Commonly known as:  ANASPAZ/LEVSIN   Stopped by:  Pharmacist, Casey Beckford           TRAMADOL HCL PO   Stopped by:  Pharmacist, Casey Beckford                    Primary Care Provider Office Phone # Fax #    Andrea Nino [phone]       [facility] [address] [address]        Equal Access to Services     [facility] : Hadii davion mckeon hadyovany Meng, waaxda luqadaha, qaybta kaalmada monica, florentin amaya . So [facility] [phone].    ATENCIÓN: Si habla español, tiene a jackson disposición servicios gratuitos de asistencia lingüística. [facility] [phone].    We comply with applicable federal civil rights laws and [place] laws. We do not discriminate on the basis of race, color, national origin, age, disability, sex, sexual orientation, or gender identity.            Thank you!     Thank you for choosing [facility]  for your care. Our goal is always to provide you with excellent care. Hearing back from our patients is one way we can continue to improve our services. Please take a few minutes to complete the written survey that you may receive in the mail after your visit with us. Thank you!             Your Updated Medication List - Protect others around you: Learn how to safely use, store and throw away your medicines at www.disposemymeds.org.          This list is accurate as of 1/31/18  2:02 PM.  Always use your most recent med list.                   Brand Name Dispense Instructions for use Diagnosis    acetaminophen 32 mg/mL solution    TYLENOL    300 mL    30.45 mLs (975 mg) by Per  Feeding Tube route 3 times daily as needed    Esophageal perforation       BENADRYL PO      Take 25 mg by mouth nightly as needed        buprenorphine 10 MCG/HR WK patch    BUTRANS     Place 1 patch onto the skin once a week        cholestyramine 4 G Packet    QUESTRAN     Take 1 packet by mouth daily        CULTURELLE PO      Take 1 tablet by mouth 2 times daily        ferrous sulfate 300 (60 FE) MG/5ML syrup     150 mL    5 mLs (300 mg) by Per J Tube route daily    Esophageal anastomotic leak       gabapentin 400 MG capsule    NEURONTIN     Take 400 mg by mouth 2 times daily        loperamide 1 MG/5ML liquid    IMODIUM    200 mL    Take 20 mLs (4 mg) by mouth 4 times daily as needed for diarrhea    Bowel habit changes       medical cannabis (Patient's own supply.  Not a prescription)      1 Units        MELATONIN PO      Take 5 mg by mouth At Bedtime        multivitamins with minerals Liqd liquid      Take 15 mLs by mouth daily        * order for DME     1 Device    Equipment being ordered:  [employer] Suction Machine-Intermittent Suction Canisters(2) Suction Canister Holders(2) Suction Connect Tube(2) 5 in 1 Connector(2) Bacteria Filter(2) Yaunkauer Suction(2)  Treatment Diagnosis: Esophogeal Perforation, s/p esophagectomy    Hx of esophagectomy       * order for DME     1 Device    Equipment being ordered:  Suction Pump Suction Canister(2) Suction Tubing(2) Bacteria Filter(2) Yaunkauer(4) Red Rubber Catheter(2)  Treatment Diagnosis: Esophogeal Perforation, s/p esophagectomy    Esophageal perforation       oxyCODONE IR 5 MG tablet    ROXICODONE    84 tablet    Take 1 tablet (5 mg) by mouth See Admin Instructions One tablet every 4-6 hours as needed for pain    Esophageal anastomotic leak       pantoprazole 40 MG EC tablet    PROTONIX    30 tablet    Take by mouth 30-60 minutes before a meal.    Gastroesophageal reflux disease, esophagitis presence not specified       traZODone 100 MG tablet    DESYREL    30 tablet     0.5 tablets (50 mg) by Per G Tube route At Bedtime    Insomnia, unspecified type       UNABLE TO FIND      2 nell supplement 4 cans daily per g tube        * Notice:  This list has 2 medication(s) that are the same as other medications prescribed for you. Read the directions carefully, and ask your doctor or other care provider to review them with you.

## 2018-01-31 NOTE — ANESTHESIA PREPROCEDURE EVALUATION
Anesthesia Evaluation     . Pt has had prior anesthetic. Type: General, MAC and Regional           ROS/MED HX    ENT/Pulmonary:     (+)tobacco use, Past use quit  packs/day  , recent URI resolved April 2017 possible aspiration pneumonia-hospitalized discharge and ultimately found to have an abscess postsurgical: . .    Neurologic:     (+)neuropathy - toes, Multiple Sclerosis (Dx age 31: seen at [place]; subsequent episodes of neurologic sx. Ambulatory with no restrictions)     Cardiovascular:     (+) ----. : . . . :. . Previous cardiac testing Echodate:1/11/17results:Technically difficult study. LVEF >70%.  The RV function is hyperdynamic.  Mild pulmonary hypertension is present.    date: results:ECG reviewed date:7/2017 results:Poor data quality, interpretation may be adversely affected  Sinus rhythm  Low voltage QRS  Borderline ECG  When compared with ECG of 27-JUN-2017 08:54,  Premature atrial complexes are no longer Present date: results:          METS/Exercise Tolerance:  >4 METS   Hematologic:     (+) Anemia, History of Transfusion no previous transfusion reaction -      Musculoskeletal:  - neg musculoskeletal ROS       GI/Hepatic:     (+) GERD Asymptomatic on medication, Other GI/Hepatic persistent esophagocutaneous fistula      Renal/Genitourinary:  - ROS Renal section negative       Endo:  - neg endo ROS       Psychiatric:  - neg psychiatric ROS       Infectious Disease:  - neg infectious disease ROS       Malignancy:   (+) Malignancy History of Breast  Breast CA Remission status post Surgery, Chemo and Radiation.         Other:    (+) No chance of pregnancy H/O Chronic Pain,H/O chronic opiod use , no other significant disability                    Physical Exam      Airway   Mallampati: I  TM distance: >3 FB  Neck ROM: full    Dental   (+) missing and chipped    Cardiovascular   Rhythm and rate: regular and normal      Pulmonary    breath sounds clear to auscultation    Other findings: LABS:  CBC RESULTS:  Lab Test        11/15/17                       1651          WBC          11.2*         RBC          3.70*         HGB          11.0*         HCT          36.2          MCV          98            MCH          29.7          MCHC         30.4*         RDW          13.5          PLT          380           Last Basic Metabolic Panel:  Lab Results      Component                Value               Date                      NA                       136                 11/15/2017             Lab Results      Component                Value               Date                      POTASSIUM                4.1                 11/15/2017            Lab Results      Component                Value               Date                      CHLORIDE                 101                 11/15/2017            Lab Results      Component                Value               Date                      ANNABELLE                      9.0                 11/15/2017            Lab Results      Component                Value               Date                      CO2                      31                  11/15/2017            Lab Results      Component                Value               Date                      BUN                      14                  11/15/2017            Lab Results      Component                Value               Date                      CR                       0.48                11/15/2017            Lab Results      Component                Value               Date                      GLC                      80                  11/15/2017          Albumin 3.4 in December 2017             PAC Discussion and Assessment    ASA Classification: 3  Case is suitable for:   Anesthetic techniques and relevant risks discussed: GA  Invasive monitoring and risk discussed:   Types:   Possibility and Risk of blood transfusion discussed:   NPO instructions given:   Additional anesthetic preparation and risks discussed:   Needs early  admission to pre-op area:   Other:     PAC Resident/NP Anesthesia Assessment:  72 year old female for EGD with stent removal on 2/12/18 with Dr. Jens Wise, and partial sternotomy with esophageal repair, stent placement, phyarygotomy and flap to chest, with Bhupinder Wise and Trevon on 2/16/18, in continued treatment of tracheoesphageal fistula/stricture/perforation, complications stemming from hiatal hernia surgery. PAC referral for risk assessment and optimization for anesthesia.  Pre-operative consdierations include:  1.) Nutritional status:  S/P retrosternal gastric pull-up for chronic esophageal perforation complicated by anastomotic leak and esophagocutaneous fistula. Multiple EGD, stent revision and cauterization fistula. Most recent procedure was IR jejunostomy tube change. Nutrition through tube-can take liquids orally.  Low normal albumin last month.   CMP  2.) Cardiac: Functional status independent. Exercise tolerance > 4 METS. One episode of atrial fibrillation in the setting of one of her surgeries, treated x 6 months-no current meds. No Chest, jaw, arm pain , no OSORIO.  EKG 7/2017 Poor quality but read as  Sinus rhythm, Low voltage QRS, Borderline ECG Previous Premature atrial complexes were no longer present  ECHO: Technically difficult study. LVEF >70%. RV function is hyperdynamic. Mild pulmonary hypertension is present.  RCRI = 0.9% risk of major adverse cardiac event.   3.) Pulmonary: Distant smoking hx. Possible aspiration pneumonia with admission 9 months ago.   CHARLIE risks 1/8 = low risk  4.) GI: Jejunosotmy tube with leak. GERD on PPI. PONV score = 3 (2 or > antiemetic prolphylaxis recommended).  5.) Neuro: Fibromyalgia. Multiple sclerosis with no current limitations with ambulation or balance. 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 Meniere's dx.  6.) Chronic pain on Butrans patch, oxycodone 5 every 6 hours, Gabapentin 400 BID  Seen today by pharmacy and post-op pain plan in  place.   Multiple past experiences with anesthesia with no reported or documented problems. Pt also seen by Dr. Jenkins. See her recommendations below.          Reviewed and Signed by PAC Mid-Level Provider/Resident  Mid-Level Provider/Resident: Arabella Gonzales PA-C  Date: 1/31/18  Time: 1:08 PM    Attending Anesthesiologist Anesthesia Assessment:  72 year old for completion esophagectomy, repair of esophageal-cutaneous fistula, pharyngotomy, etc in ongoing saga of esophageal perforation after hiatal hernia surgery. Chart reviewed, patient seen and evaluated; agree with above assessment. Patient is stable at this point, nutritional status better with g-tube feedings. Patient has no significant cardiac or pulmonary disease except for mild pulmonary HTN (RVSP 30 mmHg). Wise's plan is for just local Exparel, but with pectoralis flap and mini sternotomy, may benefit from more widespread block.    Patient is appropriate for the planned procedure without further workup or medical management change. The final anesthesia plan will be determined by the physician anesthesiologist caring for the patient on the day of surgery.    Reviewed and Signed by PAC Anesthesiologist  Anesthesiologist: zion  Date: 1/31/2018  Time:   Pass/Fail: Pass  Disposition:     PAC Pharmacist Assessment:        Pharmacist:   Date:   Time:                           .

## 2018-01-31 NOTE — PROGRESS NOTES
Preoperative Assessment Center medication history for January 31, 2018 is complete.    See Epic admission navigator for allergy information, pharmacy and prior to admission medications.    Operating room staff will still need to confirm medications and last dose information on day of surgery.     Medication history interview sources:   Patient    Changes made to PTA medication list (reason)  Added:  Gabapentin 400mg po bid  Deleted: Lomotil, hydroxyzine, hyoscyamine, tramadol  Changed:  none    Additional medication history information (including reliability of information, actions taken by pharmacist): Patient has been on medical cannabis for about 1 month.    -- No recent (within 30 days) course of antibiotics  -- No recent (within 30 days) course of steroids  -- No recent (within 30 days) chronic daily medications stopped   -- Patient declines being on any other prescription or over-the-counter medications    Prior to Admission medications    Medication Sig Last Dose Taking? Auth Provider   medical cannabis (Patient's own supply.  Not a prescription) 1 Units Taking Yes Reported, Patient   gabapentin (NEURONTIN) 400 MG capsule Take 400 mg by mouth 2 times daily  Taking Yes Reported, Patient   buprenorphine (BUTRANS) 10 MCG/HR WK patch Place 1 patch onto the skin once a week  Taking Yes Reported, Patient   ferrous sulfate 300 (60 FE) MG/5ML syrup 5 mLs (300 mg) by Per J Tube route daily Taking Yes Sarina Serna APRN CNS   acetaminophen (TYLENOL) 32 mg/mL solution 30.45 mLs (975 mg) by Per Feeding Tube route 3 times daily as needed Taking Yes Nalini Barreto MD   oxyCODONE (ROXICODONE) 5 MG IR tablet Take 1 tablet (5 mg) by mouth See Admin Instructions One tablet every 4-6 hours as needed for pain Taking Yes Sarina Serna APRN CNS   MELATONIN PO Take 5 mg by mouth At Bedtime Taking Yes Reported, Patient   pantoprazole (PROTONIX) 40 MG EC tablet Take by mouth 30-60 minutes before a  meal.  Patient taking differently: Take 40 mg by mouth daily Take by mouth 30-60 minutes before a meal. Taking Yes Sarina Serna APRN CNS   traZODone (DESYREL) 100 MG tablet 0.5 tablets (50 mg) by Per G Tube route At Bedtime  Patient taking differently: 150 mg by Per G Tube route At Bedtime  Taking Yes Saul Rogers PA-C   DiphenhydrAMINE HCl (BENADRYL PO) Take 25 mg by mouth nightly as needed Taking Yes Unknown, Entered By History   multivitamins with minerals (CERTAVITE/CEROVITE) LIQD liquid Take 15 mLs by mouth daily Taking Yes Unknown, Entered By History   loperamide (IMODIUM) 1 MG/5ML liquid Take 20 mLs (4 mg) by mouth 4 times daily as needed for diarrhea  Patient taking differently: Take 4 mg by mouth 2 times daily  Taking Yes Saul Rogers PA-C   Lactobacillus Rhamnosus, GG, (CULTURELLE PO) Take 1 tablet by mouth 2 times daily  Taking Yes Reported, Patient   UNABLE TO FIND 2 nell supplement 4 cans daily per g tube   Reported, Patient   order for DME Equipment being ordered:   [facility] Suction Machine-Intermittent  Suction Canisters(2)  Suction Canister Holders(2)  Suction Connect Tube(2)  5 in 1 Connector(2)  Bacteria Filter(2)  Yaunkauer Suction(2)    Treatment Diagnosis: Esophogeal Perforation, s/p esophagectomy   Blas Dominguez MD   order for DME Equipment being ordered:   Suction Pump  Suction Canister(2)  Suction Tubing(2)  Bacteria Filter(2)  Yaunkauer(4)  Red Rubber Catheter(2)    Treatment Diagnosis: Esophogeal Perforation, s/p esophagectomy   Blas Dominguez MD   cholestyramine (QUESTRAN) 4 G Packet Take 1 packet by mouth daily Not Taking  Unknown, Entered By History         Medication history completed by: Mell Mackey RPH

## 2018-01-31 NOTE — MR AVS SNAPSHOT
After Visit Summary   2018    Minerva Blanco    MRN: [id number]           Patient Information     Date Of Birth          1945        Visit Information        Provider Department      2018 12:30 PM Rn, [facility]        Care Instructions    Preparing for Your Surgery      Name:  Minerva Blanco   MRN:  [id number]   :  1945   Today's Date:  2018     Arriving for surgery:  Surgery date:  18  Arrival time:  09:15 a.m.  Please come to:       [facility] [address]  [address] [place]  [address]    -   parking is available in front of the hospital from 5:15 am to 8:00 pm    -  Stop at the Information Desk in the lobby    -   Inform the information person that you are here for surgery. An escort to 3c will be provided. If you would not like an escort, please proceed to 3C on the 3rd floor. [phone]     What can I eat or drink?  -  You may have solid food or milk products until 8 hours prior to your surgery.  03:15 a.m.  -  You may have water, apple juice or 7up/Sprite until 2 hours prior to your surgery.  05:15 a.m.    Which medicines can I take? (Please hold multivitamin last dose 18  -  Do NOT take these medications in the morning, the day of surgery:      Ferrous sulfate, fiber, imodium, lactobacillus  -  Please take these medications the day of surgery:      Protonix, buprenorphine, hyoscyamine, Tramadol, oxycodone, hydroyzxine, Tylenol and  Diphenhydramine as needed    How do I prepare myself?  -  Take two showers: one the night before surgery; and one the morning of surgery.         Use Scrubcare or Hibiclens to wash from neck down.  You may use your own shampoo and conditioner. No other hair products.   -  Do NOT use lotion, powder, deodorant, or antiperspirant the day of your surgery.  -  Do NOT wear any makeup, fingernail polish or jewelry.  -Do not bring your own  medications to the hospital, except for inhalers and eye drops.  -  Bring your ID and insurance card.    Questions or Concerns:  If you have questions or concerns, please call the  Preoperative [facility] Center, Monday-Friday 7AM-7PM:  [phone]  AFTER YOUR SURGERY  Breathing exercises   Breathing exercises help you recover faster. Take deep breaths and let the air out slowly. This will:     Help you wake up after surgery.    Help prevent complications like pneumonia.  Preventing complications will help you go home sooner.   We may give you a breathing device (incentive spirometer) to encourage you to breathe deeply.   Nausea and vomiting   You may feel sick to your stomach after surgery; if so, let your nurse know.    Pain control:  After surgery, you may have pain. Our goal is to help you manage your pain. Pain medicine will help you feel comfortable enough to do activities that will help you heal.  These activities may include breathing exercises, walking and physical therapy.   To help your health care team treat your pain we will ask: 1) If you have pain  2) where it is located 3) describe your pain in your words  Methods of pain control include medications given by mouth, vein or by nerve block for some surgeries.  We may give you a pain control pump that will:  1) Deliver the medicine through a tube placed in your vein  2) Control the amount of medicine you receive  3) Allow you to push a button to deliver a dose of pain medicine  Sequential Compression Device (SCD) or Pneumo Boots:  You may need to wear SCD S on your legs or feet. These are wraps connected to a machine that pumps in air and releases it. The repeated pumping helps prevent blood clots from forming.                       Follow-ups after your visit        Your next 10 appointments already scheduled     Jan 31, 2018  1:00 PM CST   (Arrive by 12:45 PM)   PAC Anesthesia Consult with  Pac Anesthesiologist   [facility] ([facility])    [address] [place] [address]  4th [facility] [id number] [phone]            Jan 31, 2018  1:15 PM CST   LAB with  LAB   [facility] ([facility])    [address] [place] [id number] [phone]           Please do not eat 10-12 hours before your appointment if you are coming in fasting for labs on lipids, cholesterol, or glucose (sugar). This does not apply to pregnant women. Water, hot tea and black coffee (with nothing added) are okay. Do not drink other fluids, diet soda or chew gum.            Feb 12, 2018   Procedure with Jens Wise MD   [facility], Same Day Surgery (--)    [address] [facility] [address]   [phone]            Feb 16, 2018   Procedure with Jens Wise MD   [facility], Same Day Surgery (--)    [address] [facility] [address]   [phone]            Mar 07, 2018 10:45 AM CST   (Arrive by 10:30 AM)   Post-Op with BARBARA Lester MD   [facility] ([facility])    [address] [place] [id number] [phone]              Who to contact     Please call your clinic at [phone] to:    Ask questions about your health    Make or cancel appointments    Discuss your medicines    Learn about your test results    Speak to your doctor   If you have compliments or concerns about an experience at your clinic, or if you wish to file a complaint, please contact [facility] Patient Relations at [phone] or email us at [email or web address]         Additional Information About Your Visit        MyChart Information     [employer] gives you secure access to your electronic health record. If you see a primary care provider, you can also send messages to your care team and make appointments. If you have questions, please call your  primary care clinic.  If you do not have a primary care provider, please call [phone] and they will assist you.      [employer] is an electronic gateway that provides easy, online access to your medical records. With [employer], you can request a clinic appointment, read your test results, renew a prescription or communicate with your care team.     To access your existing account, please contact your [facility] or call [phone] for assistance.        Care EveryWhere ID     This is your Care EveryWhere ID. This could be used by other organizations to access your [facility] medical records  [id number]         Blood Pressure from Last 3 Encounters:   01/31/18 136/79   01/31/18 129/79   01/10/18 129/79    Weight from Last 3 Encounters:   01/31/18 44.9 kg (99 lb)   01/31/18 42.2 kg (93 lb)   01/10/18 42.6 kg (93 lb 14.4 oz)              Today, you had the following     No orders found for display         Today's Medication Changes          These changes are accurate as of 1/31/18 12:44 PM.  If you have any questions, ask your nurse or doctor.               These medicines have changed or have updated prescriptions.        Dose/Directions    loperamide 1 MG/5ML liquid   Commonly known as:  IMODIUM   This may have changed:  when to take this   Used for:  Bowel habit changes        Dose:  4 mg   Take 20 mLs (4 mg) by mouth 4 times daily as needed for diarrhea   Quantity:  200 mL   Refills:  0       pantoprazole 40 MG EC tablet   Commonly known as:  PROTONIX   This may have changed:    - how much to take  - how to take this  - when to take this  - additional instructions   Used for:  Gastroesophageal reflux disease, esophagitis presence not specified        Take by mouth 30-60 minutes before a meal.   Quantity:  30 tablet   Refills:  0       traZODone 100 MG tablet   Commonly known as:  DESYREL   This may have changed:  how much to take   Used for:  Insomnia, unspecified type        Dose:   50 mg   0.5 tablets (50 mg) by Per G Tube route At Bedtime   Quantity:  30 tablet   Refills:  0         Stop taking these medicines if you haven't already. Please contact your care team if you have questions.     diphenoxylate-atropine 2.5-0.025 MG per tablet   Commonly known as:  LOMOTIL   Stopped by:  Pharmacist, [facility]           fiber modular packet   Stopped by:  Pharmacist, Casey Beckford           hydrOXYzine 25 MG capsule   Commonly known as:  VISTARIL   Stopped by:  Pharmacist, [facility] Pac           hyoscyamine 0.125 MG tablet   Commonly known as:  ANASPAZ/LEVSIN   Stopped by:  Pharmacist, Casey Beckford           TRAMADOL HCL PO   Stopped by:  Pharmacist,  Shakeel                    Primary Care Provider Office Phone # Fax #    Andrea Nino [phone]       [place] [address]        Equal Access to Services     [facility] : Dora shannon Sothiago, waaxda luqadaha, qaybta kaalmada monica, florentin amaya . So [facility] [phone].    ATENCIÓN: Si habla español, tiene a jackson disposición servicios gratuitos de asistencia lingüística. [facility] [phone].    We comply with applicable federal civil rights laws and [place] laws. We do not discriminate on the basis of race, color, national origin, age, disability, sex, sexual orientation, or gender identity.            Thank you!     Thank you for choosing [facility]  for your care. Our goal is always to provide you with excellent care. Hearing back from our patients is one way we can continue to improve our services. Please take a few minutes to complete the written survey that you may receive in the mail after your visit with us. Thank you!             Your Updated Medication List - Protect others around you: Learn how to safely use, store and throw away your medicines at www.disposemymeds.org.          This list is accurate as of 1/31/18 12:44 PM.  Always use  your most recent med list.                   Brand Name Dispense Instructions for use Diagnosis    acetaminophen 32 mg/mL solution    TYLENOL    300 mL    30.45 mLs (975 mg) by Per Feeding Tube route 3 times daily as needed    Esophageal perforation       BENADRYL PO      Take 25 mg by mouth nightly as needed        buprenorphine 10 MCG/HR WK patch    BUTRANS     Place 1 patch onto the skin once a week        cholestyramine 4 G Packet    QUESTRAN     Take 1 packet by mouth daily        CULTURELLE PO      Take 1 tablet by mouth 2 times daily        ferrous sulfate 300 (60 FE) MG/5ML syrup     150 mL    5 mLs (300 mg) by Per J Tube route daily    Esophageal anastomotic leak       gabapentin 400 MG capsule    NEURONTIN     Take 400 mg by mouth 2 times daily        loperamide 1 MG/5ML liquid    IMODIUM    200 mL    Take 20 mLs (4 mg) by mouth 4 times daily as needed for diarrhea    Bowel habit changes       medical cannabis (Patient's own supply.  Not a prescription)      1 Units        MELATONIN PO      Take 5 mg by mouth At Bedtime        multivitamins with minerals Liqd liquid      Take 15 mLs by mouth daily        * order for DME     1 Device    Equipment being ordered:  [facility] Suction Machine-Intermittent Suction Canisters(2) Suction Canister Holders(2) Suction Connect Tube(2) 5 in 1 Connector(2) Bacteria Filter(2) [employer] Suction(2)  Treatment Diagnosis: Esophogeal Perforation, s/p esophagectomy    Hx of esophagectomy       * order for DME     1 Device    Equipment being ordered:  Suction Pump Suction Canister(2) Suction Tubing(2) Bacteria Filter(2) [employer]auer(4) Red Rubber Catheter(2)  Treatment Diagnosis: Esophogeal Perforation, s/p esophagectomy    Esophageal perforation       oxyCODONE IR 5 MG tablet    ROXICODONE    84 tablet    Take 1 tablet (5 mg) by mouth See Admin Instructions One tablet every 4-6 hours as needed for pain    Esophageal anastomotic leak       pantoprazole 40 MG EC tablet    PROTONIX    30  tablet    Take by mouth 30-60 minutes before a meal.    Gastroesophageal reflux disease, esophagitis presence not specified       traZODone 100 MG tablet    DESYREL    30 tablet    0.5 tablets (50 mg) by Per G Tube route At Bedtime    Insomnia, unspecified type       UNABLE TO FIND      2 nell supplement 4 cans daily per g tube        * Notice:  This list has 2 medication(s) that are the same as other medications prescribed for you. Read the directions carefully, and ask your doctor or other care provider to review them with you.

## 2018-01-31 NOTE — ANESTHESIA PREPROCEDURE EVALUATION
Anesthesia Evaluation     . Pt has had prior anesthetic. Type: General, MAC and Regional           ROS/MED HX    ENT/Pulmonary:     (+)tobacco use, Past use quit  packs/day  , recent URI resolved April 2017 possible aspiration pneumonia-hospitalized discharge and ultimately found to have an abscess postsurgical: . .    Neurologic:     (+)neuropathy - toes, Multiple Sclerosis (Dx age 31: seen at [place]; subsequent episodes of neurologic sx. Ambulatory with no restrictions)     Cardiovascular:     (+) ----. : . . . :. . Previous cardiac testing Echodate:1/11/17results:Technically difficult study. LVEF >70%.  The RV function is hyperdynamic.  Mild pulmonary hypertension is present.    date: results:ECG reviewed date:7/2017 results:Poor data quality, interpretation may be adversely affected  Sinus rhythm  Low voltage QRS  Borderline ECG  When compared with ECG of 27-JUN-2017 08:54,  Premature atrial complexes are no longer Present date: results:          METS/Exercise Tolerance:  >4 METS   Hematologic:     (+) Anemia, History of Transfusion no previous transfusion reaction -      Musculoskeletal:  - neg musculoskeletal ROS       GI/Hepatic:     (+) GERD Asymptomatic on medication, Other GI/Hepatic persistent esophagocutaneous fistula      Renal/Genitourinary:  - ROS Renal section negative       Endo:  - neg endo ROS       Psychiatric:  - neg psychiatric ROS       Infectious Disease:  - neg infectious disease ROS       Malignancy:   (+) Malignancy History of Breast  Breast CA Remission status post Surgery, Chemo and Radiation.         Other:    (+) No chance of pregnancy H/O Chronic Pain,H/O chronic opiod use , no other significant disability                    Physical Exam      Airway   Mallampati: I  TM distance: >3 FB  Neck ROM: full    Dental   (+) missing and chipped    Cardiovascular   Rhythm and rate: regular and normal      Pulmonary    breath sounds clear to auscultation    Other findings: LABS:  CBC RESULTS:  Lab Test        11/15/17                       1651          WBC          11.2*         RBC          3.70*         HGB          11.0*         HCT          36.2          MCV          98            MCH          29.7          MCHC         30.4*         RDW          13.5          PLT          380           Last Basic Metabolic Panel:  Lab Results      Component                Value               Date                      NA                       136                 11/15/2017             Lab Results      Component                Value               Date                      POTASSIUM                4.1                 11/15/2017            Lab Results      Component                Value               Date                      CHLORIDE                 101                 11/15/2017            Lab Results      Component                Value               Date                      ANNABELLE                      9.0                 11/15/2017            Lab Results      Component                Value               Date                      CO2                      31                  11/15/2017            Lab Results      Component                Value               Date                      BUN                      14                  11/15/2017            Lab Results      Component                Value               Date                      CR                       0.48                11/15/2017            Lab Results      Component                Value               Date                      GLC                      80                  11/15/2017          Albumin 3.4 in December 2017             PAC Discussion and Assessment    ASA Classification: 3  Case is suitable for:   Anesthetic techniques and relevant risks discussed: GA  Invasive monitoring and risk discussed:   Types:   Possibility and Risk of blood transfusion discussed:   NPO instructions given:   Additional anesthetic preparation and risks discussed:   Needs early  admission to pre-op area:   Other:     PAC Resident/NP Anesthesia Assessment:  72 year old female for EGD with stent removal on 2/12/18 with Dr. Jens Wise, and partial sternotomy with esophageal repair, stent placement, phyarygotomy and flap to chest, with Bhupinder Wise and Trevon on 2/16/18, in continued treatment of tracheoesphageal fistula/stricture/perforation, complications stemming from hiatal hernia surgery. PAC referral for risk assessment and optimization for anesthesia.  Pre-operative consdierations include:  1.) Nutritional status:  S/P retrosternal gastric pull-up for chronic esophageal perforation complicated by anastomotic leak and esophagocutaneous fistula. Multiple EGD, stent revision and cauterization fistula. Most recent procedure was IR jejunostomy tube change. Nutrition through tube-can take liquids orally.  Low normal albumin last month.   CMP  2.) Cardiac: Functional status independent. Exercise tolerance > 4 METS. One episode of atrial fibrillation in the setting of one of her surgeries, treated x 6 months-no current meds. No Chest, jaw, arm pain , no OSORIO.  EKG 7/2017 Poor quality but read as  Sinus rhythm, Low voltage QRS, Borderline ECG Previous Premature atrial complexes were no longer present  ECHO: Technically difficult study. LVEF >70%. RV function is hyperdynamic. Mild pulmonary hypertension is present.  RCRI = 0.9% risk of major adverse cardiac event.   3.) Pulmonary: Distant smoking hx. Possible aspiration pneumonia with admission 9 months ago.   CHARLIE risks 1/8 = low risk  4.) GI: Jejunosotmy tube with leak. GERD on PPI. PONV score = 3 (2 or > antiemetic prolphylaxis recommended).  5.) Neuro: Fibromyalgia. Multiple sclerosis with no current limitations with ambulation or balance. 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 Meniere's dx.  6.) Chronic pain on Butrans patch, oxycodone 5 every 6 hours, Gabapentin 400 BID  Seen today by pharmacy and post-op pain plan in  place.   Multiple past experiences with anesthesia with no reported or documented problems. Pt also seen by Dr. Jenkins. See her recommendations below.          Reviewed and Signed by PAC Mid-Level Provider/Resident  Mid-Level Provider/Resident: Arabella Gonzales PA-C  Date: 1/31/18  Time: 1:08 PM    Attending Anesthesiologist Anesthesia Assessment:  72 year old for completion esophagectomy, repair of esophageal-cutaneous fistula, pharyngotomy, etc in ongoing saga of esophageal perforation after hiatal hernia surgery. Chart reviewed, patient seen and evaluated; agree with above assessment. Patient is stable at this point, nutritional status better with g-tube feedings. Patient has no significant cardiac or pulmonary disease except for mild pulmonary HTN (RVSP 30 mmHg). Frandy's plan is for just local Exparel, but with pectoralis flap and mini sternotomy, may benefit from more widespread block.    Patient is appropriate for the planned procedure without further workup or medical management change. The final anesthesia plan will be determined by the physician anesthesiologist caring for the patient on the day of surgery.    Reviewed and Signed by PAC Anesthesiologist  Anesthesiologist: zion  Date: 1/31/2018  Time:   Pass/Fail: Pass  Disposition:     PAC Pharmacist Assessment:  PREOPERATIVE PAIN CONSULT FOR POSTOPERATIVE PAIN MANAGEMENT  Minerva Blanco was seen and interviewed during PAC Clinic appointment on January 31, 2018 in preparation for the planned surgical procedure with Dr. Wise on 2/16/18 at the [facility] for partial sternotomy with esophageal repair, esophagogastroscopy, esophageal stent placement, pharyngostomy.  These recommendations are intended for patients admitted to the hospital after surgery and are only valid for 30 days from the date of service. If there are significant changes in opioid dosing between today and day of surgery the below recommendations may  have to be adjusted. Based on [place] Prescription Monitoring Profile and patient interview:      - OUTPATIENT MEDICATIONS (related to pain management):  -- Long-acting opioid:  Buprenorphine 10 mcg/hr patch TD weekly.  Patient will be stopping this medication prior to surgery due to the cost, last day of wearing the patch should be 2/9/18.  -- Short-acting opioid: Oxycodone 5mg po q4h prn, uses 3-4 tabs per day, increases to 6 tabs per day after a procedure.  -- Intrathecal pump:  None  -- Oral adjuvant(s): Gabapentin 400mg po bid  -- Topicals: none  -- Bowel regimen: none, patient usually has diarrhea.   -- Other relevant medications:  Patient has been on medical cannabis for about 1 month.     Verbal consent was given by patient to access pharmacy records and [place] Prescription Monitoring Profile: Yes        ASSESSMENT:    Minerva Blanco has a history of a chronic non-healing wound, atrial fibrillation, fibromyalgia, gerd, IBS, history of breast cancer, is opioid tolerant at stable doses. [facility].  No history of CHARLIE.     Outpatient opioid oral morphine equivalent (OME): ~ 30 mg/day     Other pain therapies tried in the past:   PCA - patient has received hydromorphone PCA in the past without complication  Oxycodone  Hydromorphone  Morphine - does not tolerate due to itching.  Fentanyl patch - used during previous hospitalization and patient did not find effective.     Pain therapies never tried (not an all inclusive list):   Ketamine - uncontrolled hypertension, cardiac arrhythmias, PTSD, Bipolar affective disorder, schizophrenia, psychiatric disorders (eg. hallucinations/delirium), history of brain cancer, cardiac failure, previous CVA  and is open to using ketamine for pain control.      Ms. Blanco is well know to the Inpatient Pain Service from her previous hospitalizations.  Patient reports pain is primarily located in the left  chest area, pain is described as burning (gabapentin recently started), cutaneous fistula is located in the right chest area.  Can perform all ADLs although she feels like she does not have much endurance. Sleep quality at home is poor, patient is awake every couple of hours.  Denies Etoh use, substance abuse and running out of opioids early (was instructed by iMllie Weaver CNP, pain provider, to increase oxycodone use up to 6 x 5mg per day after procedures so the total daily dose somewhat varies).  Patient has used a hydromorphone pca in the past and felt she got good pain control.  Has used a fentanyl patch during a previous hospitalization but did not achieve good pain control, likely because patient is so thin.  Achieved some reasonable pain control after an orthopedic procedure with tramadol, patient is willing to look at using this medication after the immediate post op period.  Has a G tube in place, patient reports it has been leaking, may need to get it replaced before her surgery.     RECOMMENDATIONS:   The following pain management recommendations are made based on information from today's visit and should not replace medical decision-making based on patient condition at the time of surgery or postoperatively.       - PREOPERATIVE:  -- Continue long acting opioid - none  -- Continue short acting opioid - Oxycodone 5mg po q4h prn  -- Continue adjuvants/nonopioid analgesics - Gabapentin 400mg po bid  --  Before surgery recommend gabapentin 400 mg PO x 1 dose in pre-op area if patient has not taken her AM home dose (Written in pre-op by PAC GUANAKO).  -- Before surgery recommend acetaminophen 975 mg PO in pre-op (Written in pre-op by PAC GUANAKO)         - INTRAOPERATIVE (Anesthesiologist/CRNA to consider):   -- Regional anesthesia: Defer to anesthesia for appropriate blocks or catheters.  -- Ketamine IV intraoperatively  -- Avoid remifentanil - to reduce risk of developing hyperalgesia     - POSTOPERATIVE  MANAGEMENT:  Place inpatient pain management consult to assist with acute postoperative pain management.    Opioid analgesic:     + If able to take oral medications (preferred):           -- Hydromorphone solution 2-4mg via G tube q3h prn moderate to severe pain. (hold or reduce dose as needed if patient has s/sx sedation or respiratory depression)          -- Hydromorphone 0.2-0.4mg IV q2h prn severe pain.     + If unable to take oral medications:          -- HYDROmorphone (Dilaudid) PCA dose range 0.2-0.4 mg Q 10 min lockout interval with NO continuous rate.  The PCA doses are for acute postoperative pain.  Start with 0.2mg PCA dose and increase dose by 0.1 mg for pain control or improvement in physical function, hold or reduce dose for sedation. Notify provider to assess for uncontrolled pain or sedation and adjust dose as necessary.          -- Hold other PO and IV opioids while on PCA     Nonopioid analgesics:   + Defer use of NSAID to surgeon  + Start acetaminophen 975 mg via G tube every 6 hr scheduled   + Continue gabapentin 400 mg via G tube every 12 hours scheduled     Muscle Relaxant:   + Methocarbamol 500 mg via G tube Q6 hr PRN muscle spasm OR  + If unable to tolerate PO meds, use Diazepam (Valium) 2.5 mg IV Q 6 hr PRN muscle spasm     Stool softeners/Laxatives:   + When appropriate start senna-docusate 1-2 tabs PO BID and Miralax 17 g daily to prevent opioid induced constipation.      Other:  + Recommend close monitoring of respiratory status postoperatively with capnography and continuous SpO2 monitoring. Would recommend continuing capnography beyond the usual 24 hr due to patient's opioid tolerance.      + Dexmedetomidine 0.2-0.7 mcg/kg/hr IV continuous infusion. Goal for this is NOT sedation. Goal is to reduce opioid tolerance/needs. Patient needs to be awake and alert enough to effectively use their PCA. This can only be initiated if patient is in the ICU setting.     + Ketamine IV infusion.  If  needed for acute postop uncontrolled pain, the primary service may decide to start ketamine infusion at 2.5mg/hr due to small body habitus.  Low threshold to increase to 5 mg/hr if patient is painful and not having side effects.  Ketamine for acute pain management will be used as an analgesic medication in addition to opioids when usual analgesics are suboptimal.Only start ketamine IV if patient is stable from a cardiovascular standpoint.    Low dose ketamine may be administered on a regular nursing unit according to [facility] ketamine-low dose policy.  Please see: [email or web address]      Absolute Contraindications (do not start ketamine):     Intracranial hypertension    Uncontrolled hypertension    Known or suspected schizophrenia (even if currently stable or controlled with medications)  Relative contraindications:     Cardiac failure    Previous cerebrovascular accidents    Severe psychiatric disorders associated with hallucinations or delirium  Monitoring while on IV ketamine:    Vital signs 15 min after first dose and after each dose increase    Reassess minimally every 4 hours while patient is receiving stable (unchanging) dose of ketamine  Common adverse effects of ketamine:  Psychotomimetic effects (hallucinations or dreamlike feelings), dysphoria, anxiousness, restlessness, cognitive impairment and mild sedation are most common adverse effect at these low doses, but even this adverse event doesn't happen all the time.  The psychomimetic effects may be reduced or prevented by co-administration of a benzodiazepine:  e.g. lorazepam or diazepam    Dose related and therefore less common at  low doses: hypertension (or hypotension), excessive sedation    Monitor for respiratory depression when given with other medications that cause sedation.   Discontinuation of ketamine:    Tapering down the dose for pain control may be needed with  higher doses prior to discontinuation, however the infusion can be stopped abruptly with no formal weaning if patient is experiencing side effects    Reviewed and Signed by PAC Pharmacist  Pharmacist: Mell Mackey PharmD, MS  Date: Januar 31, 2018  Time:2:01pm      Anesthesia Plan      History & Physical Review  History and physical reviewed and following examination; no interval change.    ASA Status:  3 .    NPO Status:  > 8 hours    Plan for General, RSI and ETT with Intravenous and Propofol induction. Maintenance will be Balanced.    PONV prophylaxis:  Ondansetron (or other 5HT-3) and Dexamethasone or Solumedrol  -Preop Tylenol and Gabapentin am dose  -Possible Intraop Ketamine for pain mgmt    Rufina Sarah MD  CA 3 Resident  Pager [phone]      Postoperative Care  Postoperative pain management:  Multi-modal analgesia.      Consents  Anesthetic plan, risks, benefits and alternatives discussed with:  Patient.  Use of blood products discussed: No .   .        Max Pettit MD  7:22 AM February 12, 2018                       .

## 2018-01-31 NOTE — LETTER
1/31/2018       RE: Minerva Blanco  [address]   [place] [address]     Dear Colleague,    Thank you for referring your patient, Minerva Blanco, to the [facility] PLASTIC [facility] RECON[facility]IVE SURGERY at [facility]. Please see a copy of my visit note below.    PRESENTING COMPLAINT:  Preoperative visit for upcoming esophageal fistula repair with possible pectoralis major flap on 02/16/2018.      HISTORY OF PRESENT ILLNESS:  Ms. Blanco is 72 years old, well-known to my service.  No change in her history and physical exam sputum.  She is scheduled for the upcoming above-named procedure.  She has been cleared to proceed and is seen in the PAC Clinic today.      ASSESSMENT AND PLAN:  Based on above findings, a diagnosis of an esophageal cutaneous fistula requiring exploration, repair and a muscle flap scheduled for 02/16/2018 was made.  Had a long discussion with the patient and her  once again about the planned procedure.  Was very clear that I would undertake most likely a pectoralis major muscle or musculocutaneous flap to help buttress the fistula repair, but I may end up needing a VRAM or an omental flap as well.  She understood that.  All risks, benefits and alternatives of the potential procedure including pain, infection, bleeding, scarring, asymmetry, seromas, hematomas, wound breakdown, wound dehiscence, loss of the flaps, requirement of further surgeries, injury to deeper structures like mediastinal structures, hemothorax, pneumothorax, bowel perforation, bleeding, seromas, hematomas, requirement of staged procedures, infectious complications, wound breakdown, DVT, PE, MI, CVA, pneumonia, renal failure and death were all explained.  She understood them.  Wants to proceed.  All questions were answered.  Look forward to helping her out in the near future.  All exam done in the presence of my nurse.           Again, thank you for allowing me to  participate in the care of your patient.      Sincerely,    BARBARA Lester MD

## 2018-01-31 NOTE — H&P
Pre-Operative H & P     CC:  Preoperative exam to assess for increased cardiopulmonary risk while undergoing surgery and anesthesia.    Date of Encounter: 1/31/2018  Primary Care Physician:  Andrea Ninofortino Blanco is a 72 year old female who presents for pre-operative H & P in preparation for EGD with stent removal on 2/12/18 with Dr. Jens Wise, and partial sternotomy with esophageal repair, stent placement, phyaryngotomy and flap to chest, with Bhupinder Wise and Trevon on 2/16/18, at [facility], in continued treatment of complications stemming from hiatal hernia surgery.   She is S/P retrosternal gastric pull-up for chronic esophageal perforation complicated by anastomotic leak and esophagocutaneous fistula. Multiple EGDs, stent revision and cauterization fistula. Most recent procedure was IR jejunostomy tube change. Nutrition through tube-can take liquids orally.    She is tired but still able to get out and walk. No fevers, chills, or signs of new infection.  History is obtained from the patient.     Past Medical History  Past Medical History:   Diagnosis Date     Atrial fibrillation (H) in the setting of a surgery/treated x 6 months      Breast cancer (H) 2007    right side - lumpectomy, chemo, and local radiation     Chronic infection     infection/wound for two years after esoph surgery     Esophageal perforation      Fibromyalgia      GERD (gastroesophageal reflux disease)      Hiatal hernia      History of blood transfusion      IBS (irritable bowel syndrome)      Meniere's disease     deaf left ear     MS (multiple sclerosis) (H)      Noninfectious ileitis      Other chronic pain        Past Surgical History  Past Surgical History:   Procedure Laterality Date     APPENDECTOMY       BACK SURGERY  5/1/2015    lumbar fusion     BRONCHOSCOPY FLEXIBLE N/A 4/17/2017    Procedure: BRONCHOSCOPY FLEXIBLE;;  Surgeon: Jens Wise MD;   [facility]     C GASTROSTOMY/JEJUN TUBE      J tube for feedings     CLOSE SPIT FISTULA N/A 4/17/2017    Procedure: CLOSE SPIT FISTULA;;  Surgeon: Jens Wise MD;  [facility]     COMBINED ESOPHAGOSCOPY, GASTROSCOPY, DUODENOSCOPY (EGD), REMOVE ESOPHAGEAL STENT N/A 8/26/2016    Procedure: COMBINED ESOPHAGOSCOPY, GASTROSCOPY, DUODENOSCOPY (EGD), REMOVE ESOPHAGEAL STENT;  Surgeon: Jens Wise MD;  [facility]     COMBINED ESOPHAGOSCOPY, GASTROSCOPY, DUODENOSCOPY (EGD), REPLACE ESOPHAGEAL STENT N/A 8/25/2017    Procedure: COMBINED ESOPHAGOSCOPY, GASTROSCOPY, DUODENOSCOPY (EGD), REPLACE ESOPHAGEAL STENT;;  Surgeon: Jens Wise MD;  [facility]     COMBINED ESOPHAGOSCOPY, GASTROSCOPY, DUODENOSCOPY (EGD), REPLACE ESOPHAGEAL STENT N/A 9/15/2017    Procedure: COMBINED ESOPHAGOSCOPY, GASTROSCOPY, DUODENOSCOPY (EGD), REPLACE ESOPHAGEAL STENT;  Upper Endoscopy with Stent Exchange, Cauterization of Tracheosophageal Fistula, Cauterization of Chest Wound   ;  Surgeon: Jens Wise MD;  [facility]     COMBINED ESOPHAGOSCOPY, GASTROSCOPY, DUODENOSCOPY (EGD), REPLACE ESOPHAGEAL STENT N/A 10/20/2017    Procedure: COMBINED ESOPHAGOSCOPY, GASTROSCOPY, DUODENOSCOPY (EGD), REPLACE ESOPHAGEAL STENT;  Upper Endoscopy with Stent Exchange, Cauterization Tracheosphageal Fistula Tract;  Surgeon: Nalini Barreto MD;  [facility]     COMBINED ESOPHAGOSCOPY, GASTROSCOPY, DUODENOSCOPY (EGD), REPLACE ESOPHAGEAL STENT N/A 11/3/2017    Procedure: COMBINED ESOPHAGOSCOPY, GASTROSCOPY, DUODENOSCOPY (EGD), REPLACE ESOPHAGEAL STENT;  Esophagogastroduodenoscopy, Stent Revision, Cauterization Of Traceoesophageal Fistula and stent exchange;  Surgeon: Nalini Barreto MD;  [facility]     COMBINED ESOPHAGOSCOPY, GASTROSCOPY, DUODENOSCOPY (EGD), REPLACE ESOPHAGEAL STENT N/A 11/17/2017    Procedure: COMBINED ESOPHAGOSCOPY, GASTROSCOPY, DUODENOSCOPY (EGD), REPLACE ESOPHAGEAL  STENT;  Esophagogastroduodenoscopy, Esophogeal Stent Removal, Esophogeal Stent Placement (23x100 EndoMAXX), Cauterization Of Fistula Tract;  Surgeon: Nalini Barreto MD;  [facility]     CREATE SPIT FISTULA N/A 1/9/2017    Procedure: CREATE SPIT FISTULA;  Surgeon: Jens Wise MD;  [facility]     ESOPHAGECTOMY N/A 1/9/2017    Procedure: ESOPHAGECTOMY;  Surgeon: Jens Wise MD;  [facility]     ESOPHAGOSCOPY, GASTROSCOPY, DUODENOSCOPY (EGD), COMBINED N/A 4/18/2016    Procedure: COMBINED ESOPHAGOSCOPY, GASTROSCOPY, DUODENOSCOPY (EGD);  Surgeon: Alexis Barraza MD;  [facility]     ESOPHAGOSCOPY, GASTROSCOPY, DUODENOSCOPY (EGD), COMBINED N/A 4/25/2016    Procedure: COMBINED ESOPHAGOSCOPY, GASTROSCOPY, DUODENOSCOPY (EGD);  Surgeon: Alexis Barraza MD;  [facility]     ESOPHAGOSCOPY, GASTROSCOPY, DUODENOSCOPY (EGD), COMBINED N/A 5/4/2016    Procedure: COMBINED ESOPHAGOSCOPY, GASTROSCOPY, DUODENOSCOPY (EGD);  Surgeon: Alexis Barraza MD;  [facility]     ESOPHAGOSCOPY, GASTROSCOPY, DUODENOSCOPY (EGD), COMBINED N/A 5/18/2016    Procedure: COMBINED ESOPHAGOSCOPY, GASTROSCOPY, DUODENOSCOPY (EGD);  Surgeon: Alexis Barraza MD;  [facility]     ESOPHAGOSCOPY, GASTROSCOPY, DUODENOSCOPY (EGD), COMBINED N/A 6/22/2016    Procedure: COMBINED ESOPHAGOSCOPY, GASTROSCOPY, DUODENOSCOPY (EGD);  Surgeon: Alexis Barraza MD;  [facility]     ESOPHAGOSCOPY, GASTROSCOPY, DUODENOSCOPY (EGD), COMBINED N/A 7/12/2016    Procedure: COMBINED ESOPHAGOSCOPY, GASTROSCOPY, DUODENOSCOPY (EGD);  Surgeon: Jens Wise MD;  [facility]     ESOPHAGOSCOPY, GASTROSCOPY, DUODENOSCOPY (EGD), COMBINED N/A 4/21/2017    Procedure: COMBINED ESOPHAGOSCOPY, GASTROSCOPY, DUODENOSCOPY (EGD);  Esophagogastroduodenoscopy, Pharyngostomy Tube Placement ;  Surgeon: Jens Wise MD;  [facility]     ESOPHAGOSCOPY, GASTROSCOPY,  DUODENOSCOPY (EGD), COMBINED N/A 5/19/2017    Procedure: COMBINED ESOPHAGOSCOPY, GASTROSCOPY, DUODENOSCOPY (EGD);  Upper Endoscopy, Irrigation and Debridement of Chest Wound ;  Surgeon: Nalini Barreto MD;  [facility]     ESOPHAGOSCOPY, GASTROSCOPY, DUODENOSCOPY (EGD), COMBINED N/A 5/23/2017    Procedure: COMBINED ESOPHAGOSCOPY, GASTROSCOPY, DUODENOSCOPY (EGD);;  Surgeon: Nalini Barreto MD;  [facility]     ESOPHAGOSCOPY, GASTROSCOPY, DUODENOSCOPY (EGD), COMBINED N/A 6/27/2017    Procedure: COMBINED ESOPHAGOSCOPY, GASTROSCOPY, DUODENOSCOPY (EGD);  Esophagogastroduodenoscopy With Removal And Replacement Of Esophageal Stent exchange. Exchange of pharyngtomy tube. Chest wound dressing change;  Surgeon: Nalini Barreto MD;  [facility]     ESOPHAGOSCOPY, GASTROSCOPY, DUODENOSCOPY (EGD), COMBINED N/A 8/4/2017    Procedure: COMBINED ESOPHAGOSCOPY, GASTROSCOPY, DUODENOSCOPY (EGD);  Esophagogastroduodenoscopy, Stent Removal and Stent Replacement. Dressing Change ;  Surgeon: Nalini Barreto MD;  [facility]     ESOPHAGOSCOPY, GASTROSCOPY, DUODENOSCOPY (EGD), COMBINED N/A 8/25/2017    Procedure: COMBINED ESOPHAGOSCOPY, GASTROSCOPY, DUODENOSCOPY (EGD);  Esophagogastroduodenoscopy,  Stent Revision,  Cauterization;  Surgeon: Jens Wise MD;  [facility]     ESOPHAGOSCOPY, GASTROSCOPY, DUODENOSCOPY (EGD), COMBINED N/A 10/2/2017    Procedure: COMBINED ESOPHAGOSCOPY, GASTROSCOPY, DUODENOSCOPY (EGD);  Esophagogastroduodenostomy, Replacement of Esophageal Stent, Cauterization of Tracheosophageal Fistula anc chest wall,   C-arm;  Surgeon: Nalini Barreto MD;  [facility]     ESOPHAGOSCOPY, GASTROSCOPY, DUODENOSCOPY (EGD), DILATATION, COMBINED N/A 6/29/2016    Procedure: COMBINED ESOPHAGOSCOPY, GASTROSCOPY, DUODENOSCOPY (EGD), DILATATION;  Surgeon: Jens Wise MD;  [facility]     EXPLORE NECK N/A 5/19/2017    Procedure: EXPLORE NECK;;  Surgeon: Nalini Barreto MD;  [facility]     HC UGI ENDOSCOPY W TRANSENDOSCOPIC STENT PLACEMENT N/A 7/12/2016    Procedure: COMBINED ESOPHAGOSCOPY, GASTROSCOPY, DUODENOSCOPY (EGD), PLACE TRANSENDOSCOPIC ESOPHAGEAL STENT;  Surgeon: Jens Wise MD;  [facility]     HC UGI ENDOSCOPY W TRANSENDOSCOPIC STENT PLACEMENT N/A 7/22/2016    Procedure: COMBINED ESOPHAGOSCOPY, GASTROSCOPY, DUODENOSCOPY (EGD), PLACE TRANSENDOSCOPIC ESOPHAGEAL STENT;  Surgeon: Jens Wise MD;  [facility]     INCISION AND DRAINAGE CHEST WASHOUT, COMBINED N/A 5/27/2017    Procedure: COMBINED INCISION AND DRAINAGE CHEST WASHOUT;  Chest washout;  Surgeon: Nalini Barreto MD;  [facility]     INCISION AND DRAINAGE CHEST WASHOUT, COMBINED N/A 5/28/2017    Procedure: COMBINED INCISION AND DRAINAGE CHEST WASHOUT;  Chest washout;  Surgeon: Nalini Barreto MD;  [facility]     INCISION AND DRAINAGE CHEST WASHOUT, COMBINED N/A 6/9/2017    Procedure: COMBINED INCISION AND DRAINAGE CHEST WASHOUT;  Chest washout and dressing change, Esophaogastroduodenoscopy;  Surgeon: Jens Wise MD;  [facility]     INCISION AND DRAINAGE CHEST WASHOUT, COMBINED N/A 7/17/2017    Procedure: COMBINED INCISION AND DRAINAGE CHEST WASHOUT;  Incision and Drainage of right Chest Wound, Esophagogastroduodenoscopy with stent exchange. pharangostomy tube revision;  Surgeon: Jens Wise MD;  [facility]     IRRIGATION AND DEBRIDEMENT CHEST WASHOUT, COMBINED N/A 6/29/2016    Procedure: COMBINED IRRIGATION AND DEBRIDEMENT CHEST WASHOUT;  Surgeon: Jens Wise MD;  [facility]     IRRIGATION AND DEBRIDEMENT CHEST WASHOUT, COMBINED N/A 5/23/2017    Procedure: COMBINED IRRIGATION AND DEBRIDEMENT CHEST WASHOUT;  COMBINED IRRIGATION AND DEBRIDEMENT CHEST WASHOUT,COMBINED ESOPHAGOSCOPY, GASTROSCOPY, DUODENOSCOPY (EGD) ;  Surgeon: Nalini Barreto MD;  [facility]     IRRIGATION AND DEBRIDEMENT CHEST WASHOUT, COMBINED N/A 5/24/2017     Procedure: COMBINED IRRIGATION AND DEBRIDEMENT CHEST WASHOUT;  Chest wound Washout and Dressing Change;  Surgeon: Nalini Barreto MD;  [facility]     IRRIGATION AND DEBRIDEMENT CHEST WASHOUT, COMBINED N/A 5/25/2017    Procedure: COMBINED IRRIGATION AND DEBRIDEMENT CHEST WASHOUT;  Irrigation and Debridement Chest Washout and dressing change;  Surgeon: Nalini Barreto MD;  [facility]     IRRIGATION AND DEBRIDEMENT CHEST WASHOUT, COMBINED N/A 5/26/2017    Procedure: COMBINED IRRIGATION AND DEBRIDEMENT CHEST WASHOUT;  Irrigation and Debridement Chest Washout with  dressing change;  Surgeon: Nalini Barreto MD;  [facility]     IRRIGATION AND DEBRIDEMENT CHEST WASHOUT, COMBINED N/A 5/30/2017    Procedure: COMBINED IRRIGATION AND DEBRIDEMENT CHEST WASHOUT;  Irrigation and Debridement Chest Washout , PICC line dressing change;  Surgeon: Nalini Barreto MD;  [facility]     IRRIGATION AND DEBRIDEMENT CHEST WASHOUT, COMBINED N/A 5/31/2017    Procedure: COMBINED IRRIGATION AND DEBRIDEMENT CHEST WASHOUT;  Irrigation and Debridement Chest Washout ;  Surgeon: Nalini Barreto MD;  [facility]     IRRIGATION AND DEBRIDEMENT CHEST WASHOUT, COMBINED N/A 6/1/2017    Procedure: COMBINED IRRIGATION AND DEBRIDEMENT CHEST WASHOUT;  Chest Wound Irrigation And Debridement with dressing change ;  Surgeon: Nalini Barreto MD;  [facility]     IRRIGATION AND DEBRIDEMENT CHEST WASHOUT, COMBINED N/A 6/4/2017    Procedure: COMBINED IRRIGATION AND DEBRIDEMENT CHEST WASHOUT;  COMBINED IRRIGATION AND DEBRIDEMENT CHEST WASHOUT, Esophagoscopy, Gastroscopy, Duodenoscopy (EGD) place transendoscopic esophageal stent,   Pharyngostomy and chest wall tube exchange  C-Arm;  Surgeon: Jens Wise MD;  [facility]     IRRIGATION AND DEBRIDEMENT CHEST WASHOUT, COMBINED N/A 6/2/2017    Procedure: COMBINED IRRIGATION AND DEBRIDEMENT CHEST WASHOUT;  Chest Wound Irrigation and Debridement, Dressing Change;  Surgeon: Estevan  Nalini NAVARRO MD;  [facility]     LAPAROSCOPIC ASSISTED INSERTION TUBE JEJUNOSTOMY N/A 4/17/2017    Procedure: LAPAROSCOPIC ASSISTED INSERTION TUBE JEJUNOSTOMY;;  Surgeon: Jens Wise MD;  [facility]     LAPAROSCOPY DIAGNOSTIC (GENERAL) N/A 1/9/2017    Procedure: LAPAROSCOPY DIAGNOSTIC (GENERAL);  Surgeon: Jens Wise MD;  [facility]     LAPAROSCOPY DIAGNOSTIC (GENERAL) N/A 4/17/2017    Procedure: LAPAROSCOPY DIAGNOSTIC (GENERAL);  Laparoscopic , Neck Dissection, Spit Fistula Takedown, Laparoscopic Jejunostomy Tube and Pharyngostomy Tube, Gastric Pull up, Upper Endoscopy(EGD)  , Flexible Bronchoscopy ,Sternotomy ;  Surgeon: Jens Wise MD;  [facility]     LUMPECTOMY BREAST Right 2007     NERVE BLOCK PERIPHERAL N/A 8/30/2016    Procedure: NERVE BLOCK PERIPHERAL;  Surgeon: GENERIC ANESTHESIA PROVIDER;  [facility]     NISSEN FUNDOPLICATION  1/2016     PHARYNGOSTOMY N/A 4/18/2016    Procedure: PHARYNGOSTOMY;  Surgeon: Alexis Barraza MD;  [facility]     PHARYNGOSTOMY N/A 4/25/2016    Procedure: PHARYNGOSTOMY;  Surgeon: Alexis Barraza MD;  [facility]     PHARYNGOSTOMY N/A 5/4/2016    Procedure: PHARYNGOSTOMY;  Surgeon: Alexis Barraza MD;  [facility]     PHARYNGOSTOMY N/A 6/22/2016    Procedure: PHARYNGOSTOMY;  Surgeon: Alexis Barraza MD;  [facility]     PHARYNGOSTOMY N/A 6/29/2016    Procedure: PHARYNGOSTOMY;  Surgeon: Jens Wise MD;  [facility]     PHARYNGOSTOMY N/A 4/21/2017    Procedure: PHARYNGOSTOMY;;  Surgeon: Jens Wise MD;  [facility]     PHARYNGOSTOMY Left 5/31/2017    Procedure: PHARYNGOSTOMY;;  Surgeon: Nalini Barreto MD;  [facility]     PICC INSERTION Left 8/25/2016    5fr DL [employer] PICC, 42cm (3cm external) in the L medial brachial vein w/ tip in the SVC RA junction.     PICC INSERTION - Rewire Right 05/31/2017    5fr DL [facility] PICC,  "40cm (6cm external) in the R medial brachial vein w/ tip in the SVC RA junction.     REPAIR FISTULA TRACHEOESOPHAGEAL N/A 9/15/2017    Procedure: REPAIR FISTULA TRACHEOESOPHAGEAL;;  Surgeon: Jens Wise MD;  [facility]     THORACOTOMY Right 1998    lung infection - \"hard crust formed on lung\"     THORACOTOMY Left 1/9/2017    Procedure: THORACOTOMY;  Surgeon: Jens Wise MD;  [facility]     WRIST SURGERY         Hx of Blood transfusions/reactions: yes, several in the setting of surgeries     Hx of abnormal bleeding or anti-platelet use    Menstrual history: No LMP recorded. Patient is postmenopausal.    Steroid use in the last year: no    Personal or FH with difficulty with Anesthesia:  no    Prior to Admission Medications  Current Outpatient Prescriptions   Medication Sig Dispense Refill     medical cannabis (Patient's own supply.  Not a prescription) 1 Units       gabapentin (NEURONTIN) 400 MG capsule Take 400 mg by mouth 2 times daily        buprenorphine (BUTRANS) 10 MCG/HR WK patch Place 1 patch onto the skin once a week        ferrous sulfate 300 (60 FE) MG/5ML syrup 5 mLs (300 mg) by Per J Tube route daily 150 mL 3     acetaminophen (TYLENOL) 32 mg/mL solution 30.45 mLs (975 mg) by Per Feeding Tube route 3 times daily as needed 300 mL 3     oxyCODONE (ROXICODONE) 5 MG IR tablet Take 1 tablet (5 mg) by mouth See Admin Instructions One tablet every 4-6 hours as needed for pain 84 tablet 0     MELATONIN PO Take 5 mg by mouth At Bedtime       UNABLE TO FIND 2 nell supplement 4 cans daily per g tube       pantoprazole (PROTONIX) 40 MG EC tablet Take by mouth 30-60 minutes before a meal. (Patient taking differently: Take 40 mg by mouth daily Take by mouth 30-60 minutes before a meal.) 30 tablet 0     traZODone (DESYREL) 100 MG tablet 0.5 tablets (50 mg) by Per G Tube route At Bedtime (Patient taking differently: 150 mg by Per G Tube route At Bedtime ) 30 tablet 0     order for " DME Equipment being ordered:   [facility] [place]Intermittent  Suction Canisters(2)  Suction Canister Holders(2)  Suction Connect Tube(2)  5 in 1 Connector(2)  Bacteria Filter(2)  Yaunkauer Suction(2)    Treatment Diagnosis: Esophogeal Perforation, s/p esophagectomy 1 Device 0     order for DME Equipment being ordered:   Suction Pump  Suction Canister(2)  Suction Tubing(2)  Bacteria Filter(2)  Yaunkauer(4)  Red Rubber Catheter(2)    Treatment Diagnosis: Esophogeal Perforation, s/p esophagectomy 1 Device 0     DiphenhydrAMINE HCl (BENADRYL PO) Take 25 mg by mouth nightly as needed       cholestyramine (QUESTRAN) 4 G Packet Take 1 packet by mouth daily       multivitamins with minerals (CERTAVITE/CEROVITE) LIQD liquid Take 15 mLs by mouth daily       loperamide (IMODIUM) 1 MG/5ML liquid Take 20 mLs (4 mg) by mouth 4 times daily as needed for diarrhea (Patient taking differently: Take 4 mg by mouth 2 times daily ) 200 mL      Lactobacillus Rhamnosus, GG, (CULTURELLE PO) Take 1 tablet by mouth 2 times daily          Allergies  Allergies   Allergen Reactions     Amoxicillin Diarrhea     Ativan [Lorazepam] Other (See Comments)     Hallucinations     Morphine Itching       Social History  Social History     Social History     Marital status:      Spouse name: richard     Number of children: 2     Occupational History     retired       Social History Main Topics     Smoking status: Former Smoker     Packs/day: 1.00     Years: 15.00     Types: Cigarettes     Quit date: 1/1/1998     Smokeless tobacco: Never Used     Alcohol use No     Drug use: No     Sexual activity: Not on file     Other Topics Concern     Not on file     Social History Narrative    Retired realtor.  Lives with  Richard. 2 children alive and well.       Family History  Family History   Problem Relation Age of Onset     Alzheimer Disease Mother      CEREBROVASCULAR DISEASE Father      cerebral hemorrhage     Ovarian Cancer  "Sister      Breast Cancer Daughter      ROS/MED HX    ENT/Pulmonary:     (+)tobacco use, Past use quit 30 yrs ago   April 2017 possible aspiration pneumonia-hospitalized discharge and ultimately found to have an abscess postsurgical   Neurologic:     (+)neuropathy - toes   Multiple Sclerosis (Dx age 31: seen at [facility]; subsequent episodes of neurologic sx. Ambulatory with no restrictions)     Cardiovascular:     (+) Previous cardiac testing:ECG and ECHO-see below     METS/Exercise Tolerance:  >4 METS   Hematologic:     (+) Anemia, History of Transfusion no previous transfusion reaction -      Musculoskeletal:  - neg musculoskeletal ROS       GI/Hepatic:     (+) GERD Asymptomatic on medication, Other GI/Hepatic persistent esophagocutaneous fistula      Renal/Genitourinary:  - ROS Renal section negative       Endo:  - neg endo ROS       Psychiatric:  - neg psychiatric ROS       Infectious Disease:  - neg infectious disease ROS       Malignancy:   (+) Malignancy History of Breast  Breast CA Remission status post Surgery, Chemo and Radiation.         Other:    (+) No chance of pregnancy H/O Chronic Pain,H/O chronic opiod use , no other significant disability        The complete review of systems is negative other than noted in the HPI or here.   Temp: 98.3  F (36.8  C) Temp src: Oral BP: 136/79 Pulse: 91   Resp: 16 SpO2: 97 %         99 lbs 0 oz  5' 0\"   Body mass index is 19.33 kg/(m^2).       Physical Exam  Constitutional: Awake, alert, cooperative, no apparent distress, and appears stated age.  Eyes: Pupils unequal, round with pinpoint on left and dilated and responsive on right.  extra ocular muscles intact, sclera clear, conjunctiva normal.  HENT: Normocephalic, oral pharynx with moist mucus membranes, few missing and chipped teeth. No goiter appreciated.   Respiratory: Scar along right lateral chest with late inspiratory crackles heard over this area. Clear to auscultation on left. Another scar on right " lateral/subaxillary area curving around border of breast.  Cardiovascular: Regular rate and rhythm, normal S1 and S2, and no murmur noted.  Carotids +2, no bruits. No LE edema. Palpable pulses to radial arteries.   GI: Normal bowel sounds, tube protruding from left upper abdomen with gauze pad overlying and soaked with yellow discharge, soft, non-distended, non-tender, no masses palpated. Surgical scars: as above and also on right breast  Lymph/Hematologic: No cervical lymphadenopathy and no supraclavicular lymphadenopathy.  Genitourinary:  deferred  Skin: Warm and dry.  Clean square bandage overlying right anterior chest area  Musculoskeletal: Full ROM of neck. There is no redness, warmth, or swelling of the joints. Gross motor strength is normal.    Neurologic: Awake, alert, oriented to name, place and time. Cranial nerves II-XII are grossly intact. Gait is normal.   Neuropsychiatric: Calm, cooperative. Normal affect.     Labs: (personally reviewed)  Lab Results   Component Value Date    WBC 11.2 11/15/2017     Lab Results   Component Value Date    RBC 3.70 11/15/2017     Lab Results   Component Value Date    HGB 11.0 11/15/2017     Lab Results   Component Value Date    HCT 36.2 11/15/2017     No components found for: MCT  Lab Results   Component Value Date    MCV 98 11/15/2017     Lab Results   Component Value Date    MCH 29.7 11/15/2017     Lab Results   Component Value Date    MCHC 30.4 11/15/2017     Lab Results   Component Value Date    RDW 13.5 11/15/2017     Lab Results   Component Value Date     11/15/2017     Last Basic Metabolic Panel:  Lab Results   Component Value Date     11/15/2017      Lab Results   Component Value Date    POTASSIUM 4.1 11/15/2017     Lab Results   Component Value Date    CHLORIDE 101 11/15/2017     Lab Results   Component Value Date    ANNABELLE 9.0 11/15/2017     Lab Results   Component Value Date    CO2 31 11/15/2017     Lab Results   Component Value Date    BUN 14  11/15/2017     Lab Results   Component Value Date    CR 0.48 11/15/2017     Lab Results   Component Value Date    GLC 80 11/15/2017       EKG 7/2017 Poor quality but read as Sinus rhythm, Low voltage QRS, Borderline ECG Previous Premature atrial complexes were no longer present  ECHO: Technically difficult study. LVEF >70%. RV function is hyperdynamic. Mild pulmonary hypertension is present.      ASSESSMENT and PLAN  Minerva Blanco is a 72 year old female scheduled to undergo EGD with stent removal on 2/12/18 with Dr. Jens Wise, and partial sternotomy with esophageal repair, stent placement, phyarygotomy and flap to chest, with Bhupinder Wise and Trevon on 2/16/18, in continued treatment of tracheoesphageal fistula/stricture/perforation, complications stemming from hiatal hernia surgery.     Pre-operative consdierations include:  1.) Nutritional status:  S/P retrosternal gastric pull-up for chronic esophageal perforation complicated by anastomotic leak and esophagocutaneous fistula. Multiple EGD, stent revision and cauterization fistula. Most recent procedure was IR jejunostomy tube change. Nutrition through tube-can take liquids orally.  Low normal albumin last month.   CMP    2.) Cardiac: Functional status independent. Exercise tolerance > 4 METS. One episode of atrial fibrillation in the setting of one of her surgeries, treated x 6 months-no current meds. No Chest, jaw, arm pain , no OSORIO. Exam reflects reg rate and rhythm of heart. Cardiac testing as above  No further cardiac evaluation indicated    3.) Pulmonary: Distant smoking hx. Possible aspiration pneumonia with admission 9 months ago.   CHARLIE risks 1/8 = low risk    4.) GI: Jejunosotmy tube with leak. GERD on PPI.   Take PPI DOS    5.) Neuro: Fibromyalgia. Multiple sclerosis with no current limitations with ambulation or balance. 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 Meniere's dx.  Stable    6.) Chronic pain on Butrans patch, oxycodone  5 every 6 hours, Gabapentin 400 BID  Seen today by pharmacy and post-op pain plan in place.  May take all pain meds DOS     She has the following specific operative considerations:   - RCRI :  0.9% risk of major adverse cardiac event.   - Anesthesia considerations:  Refer to PAC assessment in anesthesia records  - VTE risk: age elevates her risk  - CHARLIE # of risks 2/8 = low  - Risk of PONV score = 3.  If > 2, anti-emetic intervention recommended.  - On chronic opiates.... pain team consult- see note in EMR    Patient was discussed with Dr Jenkins.    TOYA Turner  Preoperative Assessment Center  [facility]  Phone: [phone]  Fax: [phone]

## 2018-01-31 NOTE — PATIENT INSTRUCTIONS
Preparing for Your Surgery      Name:  Minerva Blanco   MRN:  [id number]   :  1945   Today's Date:  2018     Arriving for surgery:  Surgery date:  18  Arrival time:  09:15 a.m.  Please come to:       [facility] [address]  [address] [place]  [address]    -   parking is available in front of the hospital from 5:15 am to 8:00 pm    -  Stop at the Information Desk in the lobby    -   Inform the information person that you are here for surgery. An escort to 3c will be provided. If you would not like an escort, please proceed to 3C on the 3rd floor. [phone]     What can I eat or drink?  -  You may have solid food or milk products until 8 hours prior to your surgery.  03:15 a.m.  -  You may have water, apple juice or 7up/Sprite until 2 hours prior to your surgery.  05:15 a.m.    Which medicines can I take? (Please hold multivitamin last dose 18  -  Do NOT take these medications in the morning, the day of surgery:      Ferrous sulfate, fiber, imodium, lactobacillus  -  Please take these medications the day of surgery:      Protonix, buprenorphine, hyoscyamine, Tramadol, oxycodone, hydroyzxine, Tylenol and  Diphenhydramine as needed    How do I prepare myself?  -  Take two showers: one the night before surgery; and one the morning of surgery.         Use Scrubcare or Hibiclens to wash from neck down.  You may use your own shampoo and conditioner. No other hair products.   -  Do NOT use lotion, powder, deodorant, or antiperspirant the day of your surgery.  -  Do NOT wear any makeup, fingernail polish or jewelry.  -Do not bring your own medications to the hospital, except for inhalers and eye drops.  -  Bring your ID and insurance card.    Questions or Concerns:  If you have questions or concerns, please call the  Preoperative Assessment Center, Monday-Friday 7AM-7PM:  [phone]  AFTER YOUR SURGERY  Breathing exercises   Breathing exercises  help you recover faster. Take deep breaths and let the air out slowly. This will:     Help you wake up after surgery.    Help prevent complications like pneumonia.  Preventing complications will help you go home sooner.   We may give you a breathing device (incentive spirometer) to encourage you to breathe deeply.   Nausea and vomiting   You may feel sick to your stomach after surgery; if so, let your nurse know.    Pain control:  After surgery, you may have pain. Our goal is to help you manage your pain. Pain medicine will help you feel comfortable enough to do activities that will help you heal.  These activities may include breathing exercises, walking and physical therapy.   To help your health care team treat your pain we will ask: 1) If you have pain  2) where it is located 3) describe your pain in your words  Methods of pain control include medications given by mouth, vein or by nerve block for some surgeries.  We may give you a pain control pump that will:  1) Deliver the medicine through a tube placed in your vein  2) Control the amount of medicine you receive  3) Allow you to push a button to deliver a dose of pain medicine  Sequential Compression Device (SCD) or Pneumo Boots:  You may need to wear SCD S on your legs or feet. These are wraps connected to a machine that pumps in air and releases it. The repeated pumping helps prevent blood clots from forming.

## 2018-02-01 ENCOUNTER — TELEPHONE (OUTPATIENT)
Dept: INTERVENTIONAL RADIOLOGY/VASCULAR | Facility: CLINIC | Age: 73
End: 2018-02-01

## 2018-02-01 DIAGNOSIS — Z97.8 USES FEEDING TUBE: Primary | ICD-10-CM

## 2018-02-02 ENCOUNTER — APPOINTMENT (OUTPATIENT)
Dept: MEDSURG UNIT | Facility: CLINIC | Age: 73
End: 2018-02-02
Attending: RADIOLOGY
Payer: MEDICARE

## 2018-02-02 ENCOUNTER — APPOINTMENT (OUTPATIENT)
Dept: INTERVENTIONAL RADIOLOGY/VASCULAR | Facility: CLINIC | Age: 73
End: 2018-02-02
Attending: NURSE PRACTITIONER
Payer: MEDICARE

## 2018-02-02 ENCOUNTER — HOSPITAL ENCOUNTER (OUTPATIENT)
Facility: CLINIC | Age: 73
Discharge: HOME | End: 2018-02-02
Attending: RADIOLOGY | Admitting: RADIOLOGY
Payer: MEDICARE

## 2018-02-02 VITALS
SYSTOLIC BLOOD PRESSURE: 124 MMHG | DIASTOLIC BLOOD PRESSURE: 64 MMHG | RESPIRATION RATE: 16 BRPM | OXYGEN SATURATION: 97 % | TEMPERATURE: 98.2 F | HEART RATE: 91 BPM

## 2018-02-02 DIAGNOSIS — Z97.8 USES FEEDING TUBE: ICD-10-CM

## 2018-02-02 DIAGNOSIS — K94.20 COMPLICATION OF FEEDING TUBE (H): Primary | ICD-10-CM

## 2018-02-02 PROCEDURE — 25500064 ZZH RX 255 OP 636: Performed by: RADIOLOGY

## 2018-02-02 PROCEDURE — 25000125 ZZHC RX 250: Performed by: RADIOLOGY

## 2018-02-02 PROCEDURE — A9270 NON-COVERED ITEM OR SERVICE: HCPCS | Mod: GY | Performed by: PHYSICIAN ASSISTANT

## 2018-02-02 PROCEDURE — 40000176 ZZH STATISTIC PT HEMOPHILIA CLINIC VISIT

## 2018-02-02 PROCEDURE — 25000128 H RX IP 250 OP 636: Performed by: PHYSICIAN ASSISTANT

## 2018-02-02 PROCEDURE — 25000125 ZZHC RX 250: Performed by: PHYSICIAN ASSISTANT

## 2018-02-02 PROCEDURE — 17250 CHEM CAUT OF GRANLTJ TISSUE: CPT

## 2018-02-02 PROCEDURE — A9270 NON-COVERED ITEM OR SERVICE: HCPCS | Performed by: PHYSICIAN ASSISTANT

## 2018-02-02 PROCEDURE — 25000132 ZZH RX MED GY IP 250 OP 250 PS 637: Mod: GY | Performed by: PHYSICIAN ASSISTANT

## 2018-02-02 PROCEDURE — C1769 GUIDE WIRE: HCPCS

## 2018-02-02 PROCEDURE — 27210916 ZZ H TUBE GASTRO CR5

## 2018-02-02 PROCEDURE — 27210905 ZZH KIT CR7

## 2018-02-02 PROCEDURE — 99152 MOD SED SAME PHYS/QHP 5/>YRS: CPT

## 2018-02-02 PROCEDURE — 49451 REPLACE DUOD/JEJ TUBE PERC: CPT

## 2018-02-02 RX ORDER — NALOXONE HYDROCHLORIDE 0.4 MG/ML
.1-.4 INJECTION, SOLUTION INTRAMUSCULAR; INTRAVENOUS; SUBCUTANEOUS
Status: DISCONTINUED | OUTPATIENT
Start: 2018-02-02 | End: 2018-02-02 | Stop reason: HOSPADM

## 2018-02-02 RX ORDER — HYDROCODONE BITARTRATE AND ACETAMINOPHEN 5; 325 MG/1; MG/1
1 TABLET ORAL ONCE
Status: DISCONTINUED | OUTPATIENT
Start: 2018-02-02 | End: 2018-02-02 | Stop reason: ALTCHOICE

## 2018-02-02 RX ORDER — LIDOCAINE/PRILOCAINE 2.5 %-2.5%
CREAM (GRAM) TOPICAL PRN
Qty: 30 G | Refills: 0 | Status: SHIPPED | OUTPATIENT
Start: 2018-02-02 | End: 2018-02-14

## 2018-02-02 RX ORDER — IOPAMIDOL 510 MG/ML
50 INJECTION, SOLUTION INTRAVASCULAR ONCE
Status: COMPLETED | OUTPATIENT
Start: 2018-02-02 | End: 2018-02-02

## 2018-02-02 RX ORDER — FLUMAZENIL 0.1 MG/ML
0.2 INJECTION, SOLUTION INTRAVENOUS
Status: DISCONTINUED | OUTPATIENT
Start: 2018-02-02 | End: 2018-02-02 | Stop reason: HOSPADM

## 2018-02-02 RX ORDER — SODIUM CHLORIDE 9 MG/ML
INJECTION, SOLUTION INTRAVENOUS CONTINUOUS
Status: DISCONTINUED | OUTPATIENT
Start: 2018-02-02 | End: 2018-02-02 | Stop reason: HOSPADM

## 2018-02-02 RX ORDER — FENTANYL CITRATE 50 UG/ML
25-50 INJECTION, SOLUTION INTRAMUSCULAR; INTRAVENOUS EVERY 5 MIN PRN
Status: DISCONTINUED | OUTPATIENT
Start: 2018-02-02 | End: 2018-02-02 | Stop reason: HOSPADM

## 2018-02-02 RX ORDER — OXYCODONE HCL 5 MG/5 ML
5 SOLUTION, ORAL ORAL ONCE
Status: COMPLETED | OUTPATIENT
Start: 2018-02-02 | End: 2018-02-02

## 2018-02-02 RX ORDER — LIDOCAINE 40 MG/G
CREAM TOPICAL
Status: DISCONTINUED | OUTPATIENT
Start: 2018-02-02 | End: 2018-02-02 | Stop reason: HOSPADM

## 2018-02-02 RX ADMIN — SILVER NITRATE APPLICATORS 1 APPLICATOR: 25; 75 STICK TOPICAL at 11:31

## 2018-02-02 RX ADMIN — MIDAZOLAM 2 MG: 1 INJECTION INTRAMUSCULAR; INTRAVENOUS at 11:30

## 2018-02-02 RX ADMIN — FENTANYL CITRATE 100 MCG: 50 INJECTION, SOLUTION INTRAMUSCULAR; INTRAVENOUS at 11:30

## 2018-02-02 RX ADMIN — OXYCODONE HYDROCHLORIDE 5 MG: 5 SOLUTION ORAL at 12:32

## 2018-02-02 RX ADMIN — SODIUM CHLORIDE: 9 INJECTION, SOLUTION INTRAVENOUS at 09:42

## 2018-02-02 RX ADMIN — IOPAMIDOL 20 ML: 510 INJECTION, SOLUTION INTRAVASCULAR at 11:30

## 2018-02-02 RX ADMIN — LIDOCAINE HYDROCHLORIDE 10 ML: 20 JELLY TOPICAL at 11:30

## 2018-02-02 NOTE — DISCHARGE INSTRUCTIONS
"[facility]  Interventional Radiology   J -Tube Exchange      AFTER YOU GO HOME:    Have an adult staty with you for 24 hours.     Drink plenty of fluids and resume your regular diet.    For 24 hours:     Do not drive ir operate machinery at home or at work    No alcoholic beverages    Do not make any important legal decisions    No heavy lifting greater than 10 lb for 3 days until instructed by your physician     Call Your Physician if:    You develop nausea or vomiting.    Your develop hives or rash or unexplained itching    Additional Instructions: Please call for the following problems:    Skin around tube is red , painful or has any drainage.    You have increased pain in your abdomen    Fever greater than 100.5 F and chills    You feel nauseated and  \"just not right\"      Change dressing every 48 hour or when it gets wet    Interventional Radiology Department    Physician:Jayme PITTMAN  Date:February 2, 2018  Telehone numbers: [phone]...Monday-Friday 8:00am-4:30pm                                   [phone]... After 4:30 Monday-Friday, Weekends and Holiday. Ask for the Interverntional Radiologist on call. Someone is on call 24 hours a day.                                  "

## 2018-02-02 NOTE — PROGRESS NOTES
0945 Pt on 2a prepped and ready for j tube check. PIV placed. Labs done prior to 2a arrival. H&P current. Family at BS.

## 2018-02-02 NOTE — IP AVS SNAPSHOT
MRN:[id number]                      After Visit Summary   2/2/2018    Minerva Blanco    MRN: [id number]           Visit Information        Department      2/2/2018  9:02 AM [address] [facility]          Review of your medicines      UNREVIEWED medicines. Ask your doctor about these medicines        Dose / Directions    acetaminophen 32 mg/mL solution   Commonly known as:  TYLENOL   Indication:  Pain   Used for:  Esophageal perforation        Dose:  975 mg   30.45 mLs (975 mg) by Per Feeding Tube route 3 times daily as needed   Quantity:  300 mL   Refills:  3       BENADRYL PO        Dose:  25 mg   Take 25 mg by mouth nightly as needed   Refills:  0       buprenorphine 10 MCG/HR WK patch   Commonly known as:  BUTRANS        Dose:  1 patch   Place 1 patch onto the skin once a week   Refills:  0       cholestyramine 4 G Packet   Commonly known as:  QUESTRAN        Dose:  1 packet   Take 1 packet by mouth daily   Refills:  0       CULTURELLE PO        Dose:  1 tablet   Take 1 tablet by mouth 2 times daily   Refills:  0       ferrous sulfate 300 (60 FE) MG/5ML syrup   Used for:  Esophageal anastomotic leak        Dose:  300 mg   5 mLs (300 mg) by Per J Tube route daily   Quantity:  150 mL   Refills:  3       gabapentin 400 MG capsule   Commonly known as:  NEURONTIN        Dose:  400 mg   Take 400 mg by mouth 2 times daily   Refills:  0       loperamide 1 MG/5ML liquid   Commonly known as:  IMODIUM   Used for:  Bowel habit changes        Dose:  4 mg   Take 20 mLs (4 mg) by mouth 4 times daily as needed for diarrhea   Quantity:  200 mL   Refills:  0       MELATONIN PO        Dose:  5 mg   Take 5 mg by mouth At Bedtime   Refills:  0       multivitamins with minerals Liqd liquid        Dose:  15 mL   Take 15 mLs by mouth daily   Refills:  0       oxyCODONE IR 5 MG tablet   Commonly known as:  ROXICODONE   Used for:  Esophageal anastomotic leak        Dose:  5 mg   Take 1 tablet (5 mg) by mouth See  Admin Instructions One tablet every 4-6 hours as needed for pain   Quantity:  84 tablet   Refills:  0       pantoprazole 40 MG EC tablet   Commonly known as:  PROTONIX   Used for:  Gastroesophageal reflux disease, esophagitis presence not specified        Take by mouth 30-60 minutes before a meal.   Quantity:  30 tablet   Refills:  0       traZODone 100 MG tablet   Commonly known as:  DESYREL   Used for:  Insomnia, unspecified type        Dose:  50 mg   0.5 tablets (50 mg) by Per G Tube route At Bedtime   Quantity:  30 tablet   Refills:  0       UNABLE TO FIND        2 nell supplement 4 cans daily per g tube   Refills:  0         START taking        Dose / Directions    lidocaine-prilocaine cream   Commonly known as:  EMLA   Used for:  Complication of feeding tube (H)        Apply topically as needed for moderate pain   Quantity:  30 g   Refills:  0         CONTINUE these medicines which have NOT CHANGED        Dose / Directions    medical cannabis (Patient's own supply.  Not a prescription)        Dose:  1 Units   1 Units   Refills:  0       * order for DME   Used for:  Hx of esophagectomy        Equipment being ordered:  [facility] [place]ction Machine-Intermittent Suction Canisters(2) Suction Canister Holders(2) Suction Connect Tube(2) 5 in 1 Connector(2) Bacteria Filter(2) Yaunkauer Suction(2)  Treatment Diagnosis: Esophogeal Perforation, s/p esophagectomy   Quantity:  1 Device   Refills:  0       * order for DME   Used for:  Esophageal perforation        Equipment being ordered:  Suction Pump Suction Canister(2) Suction Tubing(2) Bacteria Filter(2) Yaunkauer(4) Red Rubber Catheter(2)  Treatment Diagnosis: Esophogeal Perforation, s/p esophagectomy   Quantity:  1 Device   Refills:  0       * Notice:  This list has 2 medication(s) that are the same as other medications prescribed for you. Read the directions carefully, and ask your doctor or other care provider to review them with you.         Where to get your medicines       These medications were sent to [id number] [facility] - [place], [place] - [address] [place] [address]  [address], [place] [address]     Phone:  [phone]     lidocaine-prilocaine cream               Prescriptions were sent or printed at these locations (1 Prescription)                   [facility] [id number] [facility] - [place], [place] - [address] [facility]   [address], [place] [address]    Telephone:  [phone]   Fax:  [phone]   Hours:                  E-Prescribed (1 of 1)         lidocaine-prilocaine (EMLA) cream                 Protect others around you: Learn how to safely use, store and throw away your medicines at www.disposemymeds.org.         Follow-ups after your visit        Your next 10 appointments already scheduled     Feb 12, 2018   Procedure with Jens Wise MD   [facility], Same Day Surgery (--)    [address] [address]   [phone]            Feb 16, 2018   Procedure with Jens Wise MD   [facility], Same Day Surgery (--)    [address] [address]   [phone]            Mar 07, 2018 10:45 AM CST   (Arrive by 10:30 AM)   Post-Op with BARBARA Lester MD   [facility]astic [facility] ([facility])    [address] [place] [id number] [phone]               Care Instructions        After Care Instructions     Discharge Instructions       If questions or problems arise regarding tube function (e.g. leaking, dislodges, etc.) Contact Interventional Radiology department 24 hours a day.     For procedures that were done at the [facility]   8:00-4:30 PM Monday through Friday    Contact:[phone]    For afterhours and weekends call the [facility] main phone line   [phone] and ask for the [facility] IR on call physician number.    If DIRECTED by the RADIOLOGIST, related to specific problems with the tube functioning,  go to the Emergency  "Department.                  Further instructions from your care team       [facility]  Interventional Radiology   J -Tube Exchange      AFTER YOU GO HOME:    Have an adult staty with you for 24 hours.     Drink plenty of fluids and resume your regular diet.    For 24 hours:     Do not drive ir operate machinery at home or at work    No alcoholic beverages    Do not make any important legal decisions    No heavy lifting greater than 10 lb for 3 days until instructed by your physician     Call Your Physician if:    You develop nausea or vomiting.    Your develop hives or rash or unexplained itching    Additional Instructions: Please call for the following problems:    Skin around tube is red , painful or has any drainage.    You have increased pain in your abdomen    Fever greater than 100.5 F and chills    You feel nauseated and  \"just not right\"      Change dressing every 48 hour or when it gets wet    Interventional Radiology Department    Physician:Jayme PITTMAN  Date:February 2, 2018  [employer] BV[employer] numbers: [phone]...Monday-Friday 8:00am-4:30pm                                   [phone]... After 4:30 Monday-Friday, Weekends and Holiday. Ask for the Interverntional Radiologist on call. Someone is on call 24 hours a day.                                     Additional Information About Your Visit        [employer] Information     [employer] gives you secure access to your electronic health record. If you see a primary care provider, you can also send messages to your care team and make appointments. If you have questions, please call your primary care clinic.  If you do not have a primary care provider, please call [phone] and they will assist you.        Care EveryWhere ID     This is your Care EveryWhere ID. This could be used by other organizations to access your [facility] medical records  [id number]        Your Vitals Were     Blood Pressure Pulse Temperature Respirations Pulse " Oximetry       109/59 91 98.2  F (36.8  C) (Oral) 12 96%        Primary Care Provider Office Phone # Fax #    Andrea Nino [phone]      Equal Access to Services     TASHDIANNA JOSLYN : Dora ricardo ku eddieo Sostanislawali, waaxda luqadaha, qaybta kaalmada adeegyada, florentin mengaamir yariel. So [facility] [phone].    ATENCIÓN: Si habla español, tiene a jackson disposición servicios gratuitos de asistencia lingüística. Llame al [phone].    We comply with applicable federal civil rights laws and [place] laws. We do not discriminate on the basis of race, color, national origin, age, disability, sex, sexual orientation, or gender identity.            Thank you!     Thank you for choosing [facility] for your care. Our goal is always to provide you with excellent care. Hearing back from our patients is one way we can continue to improve our services. Please take a few minutes to complete the written survey that you may receive in the mail after you visit with us. Thank you!             Medication List: This is a list of all your medications and when to take them. Check marks below indicate your daily home schedule. Keep this list as a reference.      Medications           Morning Afternoon Evening Bedtime As Needed    acetaminophen 32 mg/mL solution   Commonly known as:  TYLENOL   30.45 mLs (975 mg) by Per Feeding Tube route 3 times daily as needed                                BENADRYL PO   Take 25 mg by mouth nightly as needed                                buprenorphine 10 MCG/HR WK patch   Commonly known as:  BUTRANS   Place 1 patch onto the skin once a week                                cholestyramine 4 G Packet   Commonly known as:  QUESTRAN   Take 1 packet by mouth daily                                CULTURELLE PO   Take 1 tablet by mouth 2 times daily                                ferrous sulfate 300 (60 FE) MG/5ML syrup   5 mLs (300 mg) by Per J Tube route daily                                 gabapentin 400 MG capsule   Commonly known as:  NEURONTIN   Take 400 mg by mouth 2 times daily                                lidocaine-prilocaine cream   Commonly known as:  EMLA   Apply topically as needed for moderate pain                                loperamide 1 MG/5ML liquid   Commonly known as:  IMODIUM   Take 20 mLs (4 mg) by mouth 4 times daily as needed for diarrhea                                medical cannabis (Patient's own supply.  Not a prescription)   1 Units                                MELATONIN PO   Take 5 mg by mouth At Bedtime                                multivitamins with minerals Liqd liquid   Take 15 mLs by mouth daily                                * order for DME   Equipment being ordered:  [facility] Suction Machine-Intermittent Suction Canisters(2) Suction Canister Holders(2) Suction Connect Tube(2) 5 in 1 Connector(2) Bacteria Filter(2) Yaunkauer Suction(2)  Treatment Diagnosis: Esophogeal Perforation, s/p esophagectomy                                * order for DME   Equipment being ordered:  Suction Pump Suction Canister(2) Suction Tubing(2) Bacteria Filter(2) Yaunkauer(4) Red Rubber Catheter(2)  Treatment Diagnosis: Esophogeal Perforation, s/p esophagectomy                                oxyCODONE IR 5 MG tablet   Commonly known as:  ROXICODONE   Take 1 tablet (5 mg) by mouth See Admin Instructions One tablet every 4-6 hours as needed for pain                                pantoprazole 40 MG EC tablet   Commonly known as:  PROTONIX   Take by mouth 30-60 minutes before a meal.                                traZODone 100 MG tablet   Commonly known as:  DESYREL   0.5 tablets (50 mg) by Per G Tube route At Bedtime                                UNABLE TO FIND   2 nell supplement 4 cans daily per g tube                                * Notice:  This list has 2 medication(s) that are the same as other medications prescribed for you. Read the directions carefully, and ask your  doctor or other care provider to review them with you.

## 2018-02-02 NOTE — IP AVS SNAPSHOT
[address] [address]                                       After Visit Summary   2/2/2018    Minerva Blanco    MRN: [id number]           After Visit Summary Signature Page     I have received my discharge instructions, and my questions have been answered. I have discussed any challenges I see with this plan with the nurse or doctor.    ..........................................................................................................................................  Patient/Patient Representative Signature      ..........................................................................................................................................  Patient Representative Print Name and Relationship to Patient    ..................................................               ................................................  Date                                            Time    ..........................................................................................................................................  Reviewed by Signature/Title    ...................................................              ..............................................  Date                                                            Time

## 2018-02-02 NOTE — PROGRESS NOTES
Pt returned to 2A s/p J tube placement. VSS, pt c/o incisional pain. J tube site CDI. Continue to monitor

## 2018-02-02 NOTE — PROCEDURES
Interventional Radiology Brief Post Procedure Note    Procedure: IR JEJUNOSTOMY TUBE CHANGE    Proceduralist: ZOHREH Torres PA-C    Assistant: None    Time Out: Prior to the start of the procedure and with procedural staff participation, I verbally confirmed the patient s identity using two indicators, relevant allergies, that the procedure was appropriate and matched the consent or emergent situation, and that the correct equipment/implants were available. Immediately prior to starting the procedure I conducted the Time Out with the procedural staff and re-confirmed the patient s name, procedure, and site/side. (The Joint Commission universal protocol was followed.)  Yes    Medications   Medication Event Details Admin User Admin Time       Sedation: IR Nurse Monitored Care   Post Procedure Summary:  Prior to the start of the procedure and with procedural staff participation, I verbally confirmed the patient s identity using two indicators, relevant allergies, that the procedure was appropriate and matched the consent or emergent situation, and that the correct equipment/implants were available. Immediately prior to starting the procedure I conducted the Time Out with the procedural staff and re-confirmed the patient s name, procedure, and site/side. (The Joint Commission universal protocol was followed.)  Yes       Sedatives: Fentanyl and Midazolam (Versed)    Vital signs, airway and pulse oximetry were monitored and remained stable throughout the procedure and sedation was maintained until the procedure was complete.  The patient was monitored by staff until sedation discharge criteria were met.    Patient tolerance: Patient tolerated the procedure well with no immediate complications.    Time of sedation in minutes: 15 Minutes minutes from beginning to end of physician one to one monitoring.          Findings: Completed jejunostomy tube exchanged under fluoroscopic guidance.  A new 16 Latvian (cut to  20 cm distal to balloon) jejunostomy tube ready for immediate use.  Reports of leaking with oral intake.  Unable to reproduce leaking on exam.  Extra distal side-holes fashioned.  Four (4) swab of silver nitrate applied to site granulation tissue.  Patient should return in 9-12 months for routine exchange or sooner if necessary.  Dx:  Uses feeding tube; Complication of feeding tube; Presence of granulation tissue.  Abby. 15 2 100 <1    Estimated Blood Loss: Minimal    Fluoroscopy Time:  minute(s)    SPECIMENS: None    Complications: 1. None     Condition: Stable    Plan:     Comments: See dictated procedure note for full details.    Faisal Mora PA-C

## 2018-02-02 NOTE — PROGRESS NOTES
Interventional Radiology Pre-Procedure Sedation Assessment   Time of Assessment: 10:04 AM    Expected Level: Minimal Sedation    Indication: Sedation is required for the following type of Procedure: GI    Sedation and procedural consent: Risks, benefits and alternatives were discussed with Patient    PO Intake: Appropriately NPO for procedure    ASA Class: Class 2 - MILD SYSTEMIC DISEASE, NO ACUTE PROBLEMS, NO FUNCTIONAL LIMITATIONS.    Mallampati: Grade 2:  Soft palate, base of uvula, tonsillar pillars, and portion of posterior pharyngeal wall visible    Lungs: Lungs Clear with good breath sounds bilaterally    Heart: Normal heart sounds and rate    History and physical reviewed and no updates needed. I have reviewed the lab findings, diagnostic data, medications, and the plan for sedation. I have determined this patient to be an appropriate candidate for the planned sedation and procedure and have reassessed the patient IMMEDIATELY PRIOR to sedation and procedure.    Faisal Mora PA-C

## 2018-02-02 NOTE — PROGRESS NOTES
Discharge instructions given and pt voiced understanding. Abdominal site is soft and flat. Decreased pain following injection to site by Jayme Mora PA-C. Up walking in room. Urinating adequate amounts. Discharged to home with family.

## 2018-02-02 NOTE — PROGRESS NOTES
Patient Name: Minerva Blanco  Medical Record Number: [id number]  Today's Date: 2/2/2018    Procedure: Jejunostomy tube check and possible exchange  Proceduralist: Abby PITTMAN    Sedation start time:1105  Sedation end time:1120  Sedation medications administered: fentanyl 100 mcg, versed 2  mg,   Total sedation time: 15 minutes    Procedure start time:1105  Procedure end time:1120    Report given to: 2A RN      Other Notes: Pt arrived to IR room 2 from . Pt denies any questions or concerns regarding procedure. Pt positioned supine and monitored per protocol. Pt tolerated procedure without any noted complications. Pt transferred back to .

## 2018-02-02 NOTE — PROGRESS NOTES
Patient having J tube site pain post-op after uneventful routine J exchange and silver nitrate application. Site looks okay without swelling or discharge. Tube and balloon seem appropriately positioned. Patient reports superficial pain not relieved by post-op oxycodone, requesting topical cream and Tramadol Rx. I injected the site with 0.5% Marcaine with improvement in pain. Patient assured pain was likely due to manipulations today and not indicative of more serious issue. Told she could call back to IR if continued concerns after discharge. No discharge narcotics given, but EMLA cream rxd.    Pt called and stated she received the completed forms in the mail  And she is going to re fax them to our clinic

## 2018-02-07 ENCOUNTER — HOME INFUSION (PRE-WILLOW HOME INFUSION) (OUTPATIENT)
Dept: PHARMACY | Facility: CLINIC | Age: 73
End: 2018-02-07

## 2018-02-08 NOTE — PROGRESS NOTES
This is a recent snapshot of the patient's [facility] medical record.  For current drug dose and complete information and questions, call [phone] or In Basket pool, [facility] home infusion ([id number])  CSN Number:  [id number]

## 2018-02-11 ASSESSMENT — LIFESTYLE VARIABLES: TOBACCO_USE: 1

## 2018-02-12 ENCOUNTER — HOSPITAL ENCOUNTER (OUTPATIENT)
Facility: CLINIC | Age: 73
Discharge: HOME | End: 2018-02-12
Attending: THORACIC SURGERY (CARDIOTHORACIC VASCULAR SURGERY) | Admitting: THORACIC SURGERY (CARDIOTHORACIC VASCULAR SURGERY)
Payer: MEDICARE

## 2018-02-12 ENCOUNTER — ANESTHESIA (OUTPATIENT)
Dept: SURGERY | Facility: CLINIC | Age: 73
End: 2018-02-12
Payer: MEDICARE

## 2018-02-12 ENCOUNTER — TELEPHONE (OUTPATIENT)
Dept: SURGERY | Facility: CLINIC | Age: 73
End: 2018-02-12

## 2018-02-12 VITALS
DIASTOLIC BLOOD PRESSURE: 81 MMHG | WEIGHT: 99.87 LBS | OXYGEN SATURATION: 91 % | SYSTOLIC BLOOD PRESSURE: 137 MMHG | HEIGHT: 60 IN | TEMPERATURE: 98.9 F | RESPIRATION RATE: 20 BRPM | BODY MASS INDEX: 19.61 KG/M2

## 2018-02-12 DIAGNOSIS — G89.18 ACUTE POST-OPERATIVE PAIN: Primary | ICD-10-CM

## 2018-02-12 DIAGNOSIS — R89.9 ABNORMAL LABORATORY TEST: ICD-10-CM

## 2018-02-12 DIAGNOSIS — K22.3 ESOPHAGEAL PERFORATION: Primary | ICD-10-CM

## 2018-02-12 LAB
ABO + RH BLD: NORMAL
ABO + RH BLD: NORMAL
BLD GP AB SCN SERPL QL: NORMAL
BLOOD BANK CMNT PATIENT-IMP: NORMAL
BLOOD BANK CMNT PATIENT-IMP: NORMAL
GLUCOSE BLDC GLUCOMTR-MCNC: 82 MG/DL (ref 70–99)
INTERPRETATION ECG - MUSE: NORMAL
SPECIMEN EXP DATE BLD: NORMAL

## 2018-02-12 PROCEDURE — 86900 BLOOD TYPING SEROLOGIC ABO: CPT | Performed by: THORACIC SURGERY (CARDIOTHORACIC VASCULAR SURGERY)

## 2018-02-12 PROCEDURE — 93010 ELECTROCARDIOGRAM REPORT: CPT | Performed by: INTERNAL MEDICINE

## 2018-02-12 PROCEDURE — 93005 ELECTROCARDIOGRAM TRACING: CPT | Performed by: OPTOMETRIST

## 2018-02-12 PROCEDURE — 25000132 ZZH RX MED GY IP 250 OP 250 PS 637: Mod: GY | Performed by: THORACIC SURGERY (CARDIOTHORACIC VASCULAR SURGERY)

## 2018-02-12 PROCEDURE — 86901 BLOOD TYPING SEROLOGIC RH(D): CPT | Performed by: THORACIC SURGERY (CARDIOTHORACIC VASCULAR SURGERY)

## 2018-02-12 PROCEDURE — 40000275 ZZH STATISTIC RCP TIME EA 10 MIN: Performed by: OPTOMETRIST

## 2018-02-12 PROCEDURE — 25000128 H RX IP 250 OP 636: Performed by: ANESTHESIOLOGY

## 2018-02-12 PROCEDURE — 40000065 ZZH STATISTIC EKG NON-CHARGEABLE: Performed by: OPTOMETRIST

## 2018-02-12 PROCEDURE — 82962 GLUCOSE BLOOD TEST: CPT

## 2018-02-12 PROCEDURE — 36000053 ZZH SURGERY LEVEL 2 EA 15 ADDTL MIN - UMMC: Performed by: THORACIC SURGERY (CARDIOTHORACIC VASCULAR SURGERY)

## 2018-02-12 PROCEDURE — 37000008 ZZH ANESTHESIA TECHNICAL FEE, 1ST 30 MIN: Performed by: THORACIC SURGERY (CARDIOTHORACIC VASCULAR SURGERY)

## 2018-02-12 PROCEDURE — 71000014 ZZH RECOVERY PHASE 1 LEVEL 2 FIRST HR: Performed by: THORACIC SURGERY (CARDIOTHORACIC VASCULAR SURGERY)

## 2018-02-12 PROCEDURE — 25000132 ZZH RX MED GY IP 250 OP 250 PS 637: Mod: GY | Performed by: ANESTHESIOLOGY

## 2018-02-12 PROCEDURE — 71000015 ZZH RECOVERY PHASE 1 LEVEL 2 EA ADDTL HR: Performed by: THORACIC SURGERY (CARDIOTHORACIC VASCULAR SURGERY)

## 2018-02-12 PROCEDURE — 25000565 ZZH ISOFLURANE, EA 15 MIN: Performed by: THORACIC SURGERY (CARDIOTHORACIC VASCULAR SURGERY)

## 2018-02-12 PROCEDURE — 71000027 ZZH RECOVERY PHASE 2 EACH 15 MINS: Performed by: THORACIC SURGERY (CARDIOTHORACIC VASCULAR SURGERY)

## 2018-02-12 PROCEDURE — A9270 NON-COVERED ITEM OR SERVICE: HCPCS | Mod: GY | Performed by: ANESTHESIOLOGY

## 2018-02-12 PROCEDURE — 27210794 ZZH OR GENERAL SUPPLY STERILE: Performed by: THORACIC SURGERY (CARDIOTHORACIC VASCULAR SURGERY)

## 2018-02-12 PROCEDURE — 86850 RBC ANTIBODY SCREEN: CPT | Performed by: THORACIC SURGERY (CARDIOTHORACIC VASCULAR SURGERY)

## 2018-02-12 PROCEDURE — 37000009 ZZH ANESTHESIA TECHNICAL FEE, EACH ADDTL 15 MIN: Performed by: THORACIC SURGERY (CARDIOTHORACIC VASCULAR SURGERY)

## 2018-02-12 PROCEDURE — 40000170 ZZH STATISTIC PRE-PROCEDURE ASSESSMENT II: Performed by: THORACIC SURGERY (CARDIOTHORACIC VASCULAR SURGERY)

## 2018-02-12 PROCEDURE — A9270 NON-COVERED ITEM OR SERVICE: HCPCS | Mod: GY | Performed by: THORACIC SURGERY (CARDIOTHORACIC VASCULAR SURGERY)

## 2018-02-12 PROCEDURE — 25000125 ZZHC RX 250: Performed by: ANESTHESIOLOGY

## 2018-02-12 PROCEDURE — 36000055 ZZH SURGERY LEVEL 2 W FLUORO 1ST 30 MIN - UMMC: Performed by: THORACIC SURGERY (CARDIOTHORACIC VASCULAR SURGERY)

## 2018-02-12 RX ORDER — LIDOCAINE HYDROCHLORIDE 20 MG/ML
INJECTION, SOLUTION INFILTRATION; PERINEURAL PRN
Status: DISCONTINUED | OUTPATIENT
Start: 2018-02-12 | End: 2018-02-12

## 2018-02-12 RX ORDER — ONDANSETRON 2 MG/ML
4 INJECTION INTRAMUSCULAR; INTRAVENOUS EVERY 30 MIN PRN
Status: DISCONTINUED | OUTPATIENT
Start: 2018-02-12 | End: 2018-02-12 | Stop reason: HOSPADM

## 2018-02-12 RX ORDER — OXYCODONE HCL 5 MG/5 ML
5 SOLUTION, ORAL ORAL ONCE
Status: COMPLETED | OUTPATIENT
Start: 2018-02-12 | End: 2018-02-12

## 2018-02-12 RX ORDER — MEPERIDINE HYDROCHLORIDE 50 MG/ML
12.5 INJECTION INTRAMUSCULAR; INTRAVENOUS; SUBCUTANEOUS
Status: DISCONTINUED | OUTPATIENT
Start: 2018-02-12 | End: 2018-02-12 | Stop reason: HOSPADM

## 2018-02-12 RX ORDER — GABAPENTIN 250 MG/5ML
400 SOLUTION ORAL ONCE
Status: COMPLETED | OUTPATIENT
Start: 2018-02-12 | End: 2018-02-12

## 2018-02-12 RX ORDER — ONDANSETRON 4 MG/1
4 TABLET, ORALLY DISINTEGRATING ORAL EVERY 30 MIN PRN
Status: DISCONTINUED | OUTPATIENT
Start: 2018-02-12 | End: 2018-02-12 | Stop reason: HOSPADM

## 2018-02-12 RX ORDER — OXYCODONE HCL 5 MG/5 ML
5-10 SOLUTION, ORAL ORAL EVERY 8 HOURS PRN
Qty: 30 ML | Refills: 0 | Status: SHIPPED | OUTPATIENT
Start: 2018-02-12 | End: 2018-02-14

## 2018-02-12 RX ORDER — NALOXONE HYDROCHLORIDE 0.4 MG/ML
.1-.4 INJECTION, SOLUTION INTRAMUSCULAR; INTRAVENOUS; SUBCUTANEOUS
Status: DISCONTINUED | OUTPATIENT
Start: 2018-02-12 | End: 2018-02-12 | Stop reason: HOSPADM

## 2018-02-12 RX ORDER — FENTANYL CITRATE 50 UG/ML
25-50 INJECTION, SOLUTION INTRAMUSCULAR; INTRAVENOUS
Status: DISCONTINUED | OUTPATIENT
Start: 2018-02-12 | End: 2018-02-12 | Stop reason: HOSPADM

## 2018-02-12 RX ORDER — DEXAMETHASONE SODIUM PHOSPHATE 4 MG/ML
INJECTION, SOLUTION INTRA-ARTICULAR; INTRALESIONAL; INTRAMUSCULAR; INTRAVENOUS; SOFT TISSUE PRN
Status: DISCONTINUED | OUTPATIENT
Start: 2018-02-12 | End: 2018-02-12

## 2018-02-12 RX ORDER — FENTANYL CITRATE 50 UG/ML
INJECTION, SOLUTION INTRAMUSCULAR; INTRAVENOUS PRN
Status: DISCONTINUED | OUTPATIENT
Start: 2018-02-12 | End: 2018-02-12

## 2018-02-12 RX ORDER — CEFAZOLIN SODIUM 2 G/100ML
2 INJECTION, SOLUTION INTRAVENOUS
Status: DISCONTINUED | OUTPATIENT
Start: 2018-02-12 | End: 2018-02-12

## 2018-02-12 RX ORDER — CEFAZOLIN SODIUM 1 G/3ML
1 INJECTION, POWDER, FOR SOLUTION INTRAMUSCULAR; INTRAVENOUS SEE ADMIN INSTRUCTIONS
Status: DISCONTINUED | OUTPATIENT
Start: 2018-02-12 | End: 2018-02-12

## 2018-02-12 RX ORDER — FENTANYL CITRATE 50 UG/ML
25-50 INJECTION, SOLUTION INTRAMUSCULAR; INTRAVENOUS EVERY 5 MIN PRN
Status: DISCONTINUED | OUTPATIENT
Start: 2018-02-12 | End: 2018-02-12 | Stop reason: HOSPADM

## 2018-02-12 RX ORDER — SODIUM CHLORIDE, SODIUM LACTATE, POTASSIUM CHLORIDE, CALCIUM CHLORIDE 600; 310; 30; 20 MG/100ML; MG/100ML; MG/100ML; MG/100ML
INJECTION, SOLUTION INTRAVENOUS CONTINUOUS
Status: DISCONTINUED | OUTPATIENT
Start: 2018-02-12 | End: 2018-02-12 | Stop reason: HOSPADM

## 2018-02-12 RX ORDER — SODIUM CHLORIDE, SODIUM LACTATE, POTASSIUM CHLORIDE, CALCIUM CHLORIDE 600; 310; 30; 20 MG/100ML; MG/100ML; MG/100ML; MG/100ML
500 INJECTION, SOLUTION INTRAVENOUS CONTINUOUS
Status: DISCONTINUED | OUTPATIENT
Start: 2018-02-12 | End: 2018-02-12 | Stop reason: HOSPADM

## 2018-02-12 RX ORDER — SODIUM CHLORIDE, SODIUM LACTATE, POTASSIUM CHLORIDE, CALCIUM CHLORIDE 600; 310; 30; 20 MG/100ML; MG/100ML; MG/100ML; MG/100ML
INJECTION, SOLUTION INTRAVENOUS CONTINUOUS PRN
Status: DISCONTINUED | OUTPATIENT
Start: 2018-02-12 | End: 2018-02-12

## 2018-02-12 RX ORDER — LIDOCAINE 40 MG/G
CREAM TOPICAL
Status: DISCONTINUED | OUTPATIENT
Start: 2018-02-12 | End: 2018-02-12 | Stop reason: HOSPADM

## 2018-02-12 RX ORDER — CEFAZOLIN SODIUM 1 G/3ML
1 INJECTION, POWDER, FOR SOLUTION INTRAMUSCULAR; INTRAVENOUS SEE ADMIN INSTRUCTIONS
Status: DISCONTINUED | OUTPATIENT
Start: 2018-02-12 | End: 2018-02-12 | Stop reason: HOSPADM

## 2018-02-12 RX ORDER — CEFAZOLIN SODIUM 2 G/100ML
2 INJECTION, SOLUTION INTRAVENOUS
Status: DISCONTINUED | OUTPATIENT
Start: 2018-02-12 | End: 2018-02-12 | Stop reason: HOSPADM

## 2018-02-12 RX ORDER — PROPOFOL 10 MG/ML
INJECTION, EMULSION INTRAVENOUS PRN
Status: DISCONTINUED | OUTPATIENT
Start: 2018-02-12 | End: 2018-02-12

## 2018-02-12 RX ORDER — ONDANSETRON 2 MG/ML
INJECTION INTRAMUSCULAR; INTRAVENOUS PRN
Status: DISCONTINUED | OUTPATIENT
Start: 2018-02-12 | End: 2018-02-12

## 2018-02-12 RX ADMIN — LIDOCAINE HYDROCHLORIDE 60 MG: 20 INJECTION, SOLUTION INFILTRATION; PERINEURAL at 08:06

## 2018-02-12 RX ADMIN — SODIUM CHLORIDE, POTASSIUM CHLORIDE, SODIUM LACTATE AND CALCIUM CHLORIDE: 600; 310; 30; 20 INJECTION, SOLUTION INTRAVENOUS at 08:00

## 2018-02-12 RX ADMIN — FENTANYL CITRATE 25 MCG: 50 INJECTION, SOLUTION INTRAMUSCULAR; INTRAVENOUS at 08:26

## 2018-02-12 RX ADMIN — MIDAZOLAM 1 MG: 1 INJECTION INTRAMUSCULAR; INTRAVENOUS at 08:00

## 2018-02-12 RX ADMIN — DEXAMETHASONE SODIUM PHOSPHATE 12 MG: 4 INJECTION, SOLUTION INTRA-ARTICULAR; INTRALESIONAL; INTRAMUSCULAR; INTRAVENOUS; SOFT TISSUE at 08:16

## 2018-02-12 RX ADMIN — FENTANYL CITRATE 25 MCG: 50 INJECTION, SOLUTION INTRAMUSCULAR; INTRAVENOUS at 09:09

## 2018-02-12 RX ADMIN — PROPOFOL 20 MG: 10 INJECTION, EMULSION INTRAVENOUS at 08:22

## 2018-02-12 RX ADMIN — FENTANYL CITRATE 50 MCG: 50 INJECTION, SOLUTION INTRAMUSCULAR; INTRAVENOUS at 08:05

## 2018-02-12 RX ADMIN — ACETAMINOPHEN 975 MG: 325 SOLUTION ORAL at 07:50

## 2018-02-12 RX ADMIN — ONDANSETRON 4 MG: 2 INJECTION INTRAMUSCULAR; INTRAVENOUS at 08:16

## 2018-02-12 RX ADMIN — PROPOFOL 20 MG: 10 INJECTION, EMULSION INTRAVENOUS at 08:30

## 2018-02-12 RX ADMIN — PROPOFOL 100 MG: 10 INJECTION, EMULSION INTRAVENOUS at 08:06

## 2018-02-12 RX ADMIN — HYDROMORPHONE HYDROCHLORIDE 0.3 MG: 1 INJECTION, SOLUTION INTRAMUSCULAR; INTRAVENOUS; SUBCUTANEOUS at 09:43

## 2018-02-12 RX ADMIN — OXYCODONE HYDROCHLORIDE 5 MG: 5 SOLUTION ORAL at 10:48

## 2018-02-12 RX ADMIN — FENTANYL CITRATE 25 MCG: 50 INJECTION, SOLUTION INTRAMUSCULAR; INTRAVENOUS at 09:29

## 2018-02-12 RX ADMIN — Medication 60 MG: at 08:06

## 2018-02-12 RX ADMIN — GABAPENTIN 400 MG: 250 SOLUTION ORAL at 07:50

## 2018-02-12 RX ADMIN — FENTANYL CITRATE 25 MCG: 50 INJECTION, SOLUTION INTRAMUSCULAR; INTRAVENOUS at 08:31

## 2018-02-12 NOTE — TELEPHONE ENCOUNTER
Call to Millie Weaver CNP at [facility] regarding Minerva's new left sided chest pain. Dr. Wise would like Millie to consider increasing her pain medication to manage this new pain. Had to leave a message on the provider message line for her. I did include all the pertinent patient information as this is a secure message line and included my call back information.

## 2018-02-12 NOTE — ANESTHESIA CARE TRANSFER NOTE
Patient: Minerva Blanco    Procedure(s):  Esophagogastroduodenoscopy With Stent Removal - Wound Class: II-Clean Contaminated    Diagnosis: Anastomotic Leak Following Esophagectomy  Diagnosis Additional Information: No value filed.    Anesthesia Type:   General, RSI, ETT     Note:  Airway :Face Mask  Patient transferred to:PACU  Comments: StableHandoff Report: Identifed the Patient, Identified the Reponsible Provider, Reviewed the pertinent medical history, Discussed the surgical course, Reviewed Intra-OP anesthesia mangement and issues during anesthesia, Set expectations for post-procedure period and Allowed opportunity for questions and acknowledgement of understanding      Vitals: (Last set prior to Anesthesia Care Transfer)    CRNA VITALS  2/12/2018 0818 - 2/12/2018 0853      2/12/2018             Pulse: 97    Ht Rate: 100    SpO2: 99 %    Resp Rate (observed): (!)  2                Electronically Signed By: Rufina Sarah MD  February 12, 2018  8:53 AM

## 2018-02-12 NOTE — OR NURSING
Pt arrived to PACU.  PACU Rn assumed care of pt @ 9787.     Pt able to state name and deny  nausea at present time.  Reports throat pain.  Lungs diminished r bi lat; tolerating nasal cannula 4 liters.  Previous chest fistula dressing c/d/i.    See flowsheet.

## 2018-02-12 NOTE — IP AVS SNAPSHOT
MRN:[id number]                      After Visit Summary   2/12/2018    Minerva Blanco    MRN: [id number]           Thank you!     Thank you for choosing [facility] for your care. Our goal is always to provide you with excellent care. Hearing back from our patients is one way we can continue to improve our services. Please take a few minutes to complete the written survey that you may receive in the mail after you visit with us. Thank you!        Patient Information     Date Of Birth          1945        About your hospital stay     You were admitted on:  February 12, 2018 You last received care in the:  Post Anesthesia Care Unit [facility]    You were discharged on:  February 12, 2018       Who to Call     For medical emergencies, please call 911.  For non-urgent questions about your medical care, please call your primary care provider or clinic, [phone]  For questions related to your surgery, please call your surgery clinic        Attending Provider     Provider Specialty    Jens Wise MD Thoracic Diseases       Primary Care Provider Office Phone # Fax #    Andrea Nino [phone]      Your next 10 appointments already scheduled     Feb 16, 2018   Procedure with Jens Wise MD   [facility], Same Day Surgery (--)    [address] [id number] [phone]            Mar 07, 2018 10:45 AM CST   (Arrive by 10:30 AM)   Post-Op with BARBARA Lester MD   [facility] ([facility])    [address]  4th Floor  [facility] [id number] [phone]              Future tests that were ordered for you     Hepatic panel                 Further instructions from your care team       [facility]  Same-Day Surgery   Adult Discharge Orders & Instructions     For 24 hours after surgery    1. Get plenty of rest.  A responsible  adult must stay with you for at least 24 hours after you leave the hospital.   2. Do not drive or use heavy equipment.  If you have weakness or tingling, don't drive or use heavy equipment until this feeling goes away.  3. Do not drink alcohol.  4. Avoid strenuous or risky activities.  Ask for help when climbing stairs.   5. You may feel lightheaded.  IF so, sit for a few minutes before standing.  Have someone help you get up.   6. If you have nausea (feel sick to your stomach): Drink only clear liquids such as apple juice, ginger ale, broth or 7-Up.  Rest may also help.  Be sure to drink enough fluids.  Move to a regular diet as you feel able.  7. You may have a slight fever. Call the doctor if your fever is over 100 F (37.7 C) (taken under the tongue) or lasts longer than 24 hours.  8. You may have a dry mouth, a sore throat, muscle aches or trouble sleeping.  These should go away after 24 hours.  9. Do not make important or legal decisions.   Call your doctor for any of the followin.  Signs of infection (fever, growing tenderness at the surgery site, a large amount of drainage or bleeding, severe pain, foul-smelling drainage, redness, swelling).    2. It has been over 8 to 10 hours since surgery and you are still not able to urinate (pass water).    3.  Headache for over 24 hours.      To contact a doctor, call Dr. Wise's office at [phone] or:        [phone] and ask for the resident on call for   Thoracic (answered 24 hours a day)      Emergency Department:    [facility]: [phone]       (TTY for hearing impaired: [phone])        Pending Results     Date and Time Order Name Status Description    2018 0620 EKG 12-lead, tracing only Preliminary             Admission Information     Date & Time Provider Department Dept. Phone    2018 Jens Wise MD Post Anesthesia Care Unit [facility] [phone]      Your Vitals Were     Blood Pressure Temperature  Respirations Height Weight Pulse Oximetry    142/82 98.1  F (36.7  C) (Oral) 16 1.524 m (5') 45.3 kg (99 lb 13.9 oz) 98%    BMI (Body Mass Index)                   19.5 kg/m2           [facility] Information     [facility] [employer] gives you secure access to your electronic health record. If you see a primary care provider, you can also send messages to your care team and make appointments. If you have questions, please call your primary care clinic.  If you do not have a primary care provider, please call [phone] and they will assist you.        Care EveryWhere ID     This is your Care EveryWhere ID. This could be used by other organizations to access your [facility] medical records  [id number]        Equal Access to Services     TOM JORGENSEN : Dora Meng, isis romero, katharina anderson, florentin saldivar. So [facility] [phone].    ATENCIÓN: Si habla español, tiene a jackson disposición servicios gratuitos de asistencia lingüística. [facility] [phone].    We comply with applicable federal civil rights laws and [place] laws. We do not discriminate on the basis of race, color, national origin, age, disability, sex, sexual orientation, or gender identity.               Review of your medicines      CONTINUE these medicines which may have CHANGED, or have new prescriptions. If we are uncertain of the size of tablets/capsules you have at home, strength may be listed as something that might have changed.        Dose / Directions    loperamide 1 MG/5ML liquid   Commonly known as:  IMODIUM   This may have changed:  when to take this   Used for:  Bowel habit changes        Dose:  4 mg   Take 20 mLs (4 mg) by mouth 4 times daily as needed for diarrhea   Quantity:  200 mL   Refills:  0       * oxyCODONE IR 5 MG tablet   Commonly known as:  ROXICODONE   This may have changed:  Another medication with the same name was added. Make sure you understand how and when to take each.   Used  for:  Esophageal anastomotic leak        Dose:  5 mg   Take 1 tablet (5 mg) by mouth See Admin Instructions One tablet every 4-6 hours as needed for pain   Quantity:  84 tablet   Refills:  0       * oxyCODONE 5 MG/5ML solution   Commonly known as:  ROXICODONE   This may have changed:  You were already taking a medication with the same name, and this prescription was added. Make sure you understand how and when to take each.   Used for:  Acute post-operative pain        Dose:  5-10 mg   Take 5-10 mLs (5-10 mg) by mouth every 8 hours as needed for pain   Quantity:  30 mL   Refills:  0       pantoprazole 40 MG EC tablet   Commonly known as:  PROTONIX   This may have changed:    - how much to take  - how to take this  - when to take this  - additional instructions   Used for:  Gastroesophageal reflux disease, esophagitis presence not specified        Take by mouth 30-60 minutes before a meal.   Quantity:  30 tablet   Refills:  0       traZODone 100 MG tablet   Commonly known as:  DESYREL   This may have changed:  how much to take   Used for:  Insomnia, unspecified type        Dose:  50 mg   0.5 tablets (50 mg) by Per G Tube route At Bedtime   Quantity:  30 tablet   Refills:  0       * Notice:  This list has 2 medication(s) that are the same as other medications prescribed for you. Read the directions carefully, and ask your doctor or other care provider to review them with you.      CONTINUE these medicines which have NOT CHANGED        Dose / Directions    acetaminophen 32 mg/mL solution   Commonly known as:  TYLENOL   Indication:  Pain   Used for:  Esophageal perforation        Dose:  975 mg   30.45 mLs (975 mg) by Per Feeding Tube route 3 times daily as needed   Quantity:  300 mL   Refills:  3       BENADRYL PO        Dose:  25 mg   Take 25 mg by mouth nightly as needed   Refills:  0       buprenorphine 10 MCG/HR WK patch   Commonly known as:  BUTRANS        Dose:  1 patch   Place 1 patch onto the skin once a week    Refills:  0       cholestyramine 4 G Packet   Commonly known as:  QUESTRAN        Dose:  1 packet   Take 1 packet by mouth daily   Refills:  0       CULTURELLE PO        Dose:  1 tablet   Take 1 tablet by mouth 2 times daily   Refills:  0       ferrous sulfate 300 (60 FE) MG/5ML syrup   Used for:  Esophageal anastomotic leak        Dose:  300 mg   5 mLs (300 mg) by Per J Tube route daily   Quantity:  150 mL   Refills:  3       gabapentin 400 MG capsule   Commonly known as:  NEURONTIN        Dose:  400 mg   Take 400 mg by mouth 2 times daily   Refills:  0       lidocaine-prilocaine cream   Commonly known as:  EMLA   Used for:  Complication of feeding tube (H)        Apply topically as needed for moderate pain   Quantity:  30 g   Refills:  0       medical cannabis (Patient's own supply.  Not a prescription)        Dose:  1 Units   1 Units   Refills:  0       MELATONIN PO        Dose:  5 mg   Take 5 mg by mouth At Bedtime   Refills:  0       multivitamins with minerals Liqd liquid        Dose:  15 mL   Take 15 mLs by mouth daily   Refills:  0       * order for DME   Used for:  Hx of esophagectomy        Equipment being ordered:  [facility] [place]-Intermittent Suction Canisters(2) Suction Canister Holders(2) Suction Connect Tube(2) 5 in 1 Connector(2) Bacteria Filter(2) Yaunkauer Suction(2)  Treatment Diagnosis: Esophogeal Perforation, s/p esophagectomy   Quantity:  1 Device   Refills:  0       * order for DME   Used for:  Esophageal perforation        Equipment being ordered:  Suction Pump Suction Canister(2) Suction Tubing(2) Bacteria Filter(2) Yaunkauer(4) Red Rubber Catheter(2)  Treatment Diagnosis: Esophogeal Perforation, s/p esophagectomy   Quantity:  1 Device   Refills:  0       UNABLE TO FIND        2 nell supplement 4 cans daily per g tube   Refills:  0       * Notice:  This list has 2 medication(s) that are the same as other medications prescribed for you. Read the directions carefully, and ask your  doctor or other care provider to review them with you.         Where to get your medicines      Some of these will need a paper prescription and others can be bought over the counter. Ask your nurse if you have questions.     Bring a paper prescription for each of these medications     oxyCODONE 5 MG/5ML solution                Protect others around you: Learn how to safely use, store and throw away your medicines at www.disposemymeds.org.        Information about OPIOIDS     PRESCRIPTION OPIOIDS: WHAT YOU NEED TO KNOW    Prescription opioids can be used to help relieve moderate to severe pain and are often prescribed following a surgery or injury, or for certain health conditions. These medications can be an important part of treatment but also come with serious risks. It is important to work with your health care provider to make sure you are getting the safest, most effective care.    WHAT ARE THE RISKS AND SIDE EFFECTS OF OPIOID USE?  Prescription opioids carry serious risks of addiction and overdose, especially with prolonged use. An opioid overdose, often marked by slowed breathing can cause sudden death. The use of prescription opioids can have a number of side effects as well, even when taken as directed:      Tolerance - meaning you might need to take more of a medication for the same pain relief    Physical dependence - meaning you have symptoms of withdrawal when a medication is stopped    Increased sensitivity to pain    Constipation    Nausea, vomiting, and dry mouth    Sleepiness and dizziness    Confusion    Depression    Low levels of testosterone that can result in lower sex drive, energy, and strength    Itching and sweating    RISKS ARE GREATER WITH:    History of drug misuse, substance use disorder, or overdose    Mental health conditions (such as depression or anxiety)    Sleep apnea    Older age (65 years or older)    Pregnancy    Avoid alcohol while taking prescription opioids.   Also, unless  specifically advised by your health care provider, medications to avoid include:    Benzodiazepines (such as Xanax or Valium)    Muscle relaxants (such as Soma or Flexeril)    Hypnotics (such as Ambien or Lunesta)    Other prescription opioids    KNOW YOUR OPTIONS:  Talk to your health care provider about ways to manage your pain that do not involve prescription opioids. Some of these options may actually work better and have fewer risks and side effects:    Pain relievers such as acetaminophen, ibuprofen, and naproxen    Some medications that are also used for depression or seizures    Physical therapy and exercise    Cognitive behavioral therapy, a psychological, goal-directed approach, in which patients learn how to modify physical, behavioral, and emotional triggers of pain and stress    IF YOU ARE PRESCRIBED OPIOIDS FOR PAIN:    Never take opioids in greater amounts or more often than prescribed    Follow up with your primary health care provider and work together to create a plan on how to manage your pain.    Talk about ways to help manage your pain that do not involve prescription opioids    Talk about all concerns and side effects    Help prevent misuse and abuse    Never sell or share prescription opioids    Never use another person's prescription opioids    Store prescription opioids in a secure place and out of reach of others (this may include visitors, children, friends, and family)    Visit www.cdc.gov/drugoverdose to learn about risks of opioid abuse and overdose    If you believe you may be struggling with addiction, tell your health care provider and ask for guidance or call [facility]'s National Helpline at 5-924-748-HELP    LEARN MORE / www.cdc.gov/drugoverdose/prescribing/guideline.html    Safely dispose of unused prescription opioids: Find your local drug take-back programs and more information about the importance of safe disposal at www.doseofreality.mn.gov             Medication List: This is a  list of all your medications and when to take them. Check marks below indicate your daily home schedule. Keep this list as a reference.      Medications           Morning Afternoon Evening Bedtime As Needed    acetaminophen 32 mg/mL solution   Commonly known as:  TYLENOL   30.45 mLs (975 mg) by Per Feeding Tube route 3 times daily as needed   Last time this was given:  975 mg on 2/12/2018  7:50 AM                                BENADRYL PO   Take 25 mg by mouth nightly as needed                                buprenorphine 10 MCG/HR WK patch   Commonly known as:  BUTRANS   Place 1 patch onto the skin once a week                                cholestyramine 4 G Packet   Commonly known as:  QUESTRAN   Take 1 packet by mouth daily                                CULTURELLE PO   Take 1 tablet by mouth 2 times daily                                ferrous sulfate 300 (60 FE) MG/5ML syrup   5 mLs (300 mg) by Per J Tube route daily                                gabapentin 400 MG capsule   Commonly known as:  NEURONTIN   Take 400 mg by mouth 2 times daily                                lidocaine-prilocaine cream   Commonly known as:  EMLA   Apply topically as needed for moderate pain                                loperamide 1 MG/5ML liquid   Commonly known as:  IMODIUM   Take 20 mLs (4 mg) by mouth 4 times daily as needed for diarrhea                                medical cannabis (Patient's own supply.  Not a prescription)   1 Units                                MELATONIN PO   Take 5 mg by mouth At Bedtime                                multivitamins with minerals Liqd liquid   Take 15 mLs by mouth daily                                * order for DME   Equipment being ordered:  [facility] [place]-Intermittent Suction Canisters(2) Suction Canister Holders(2) Suction Connect Tube(2) 5 in 1 Connector(2) Bacteria Filter(2) Yaunkauer Suction(2)  Treatment Diagnosis: Esophogeal Perforation, s/p esophagectomy                                 * order for DME   Equipment being ordered:  Suction Pump Suction Canister(2) Suction Tubing(2) Bacteria Filter(2) Yaunkauer(4) Red Rubber Catheter(2)  Treatment Diagnosis: Esophogeal Perforation, s/p esophagectomy                                * oxyCODONE IR 5 MG tablet   Commonly known as:  ROXICODONE   Take 1 tablet (5 mg) by mouth See Admin Instructions One tablet every 4-6 hours as needed for pain                                * oxyCODONE 5 MG/5ML solution   Commonly known as:  ROXICODONE   Take 5-10 mLs (5-10 mg) by mouth every 8 hours as needed for pain                                pantoprazole 40 MG EC tablet   Commonly known as:  PROTONIX   Take by mouth 30-60 minutes before a meal.                                traZODone 100 MG tablet   Commonly known as:  DESYREL   0.5 tablets (50 mg) by Per G Tube route At Bedtime                                UNABLE TO FIND   2 nell supplement 4 cans daily per g tube                                * Notice:  This list has 4 medication(s) that are the same as other medications prescribed for you. Read the directions carefully, and ask your doctor or other care provider to review them with you.

## 2018-02-12 NOTE — IP AVS SNAPSHOT
Post Anesthesia Care Unit [address] [address]    Phone:  [phone]                                       After Visit Summary   2/12/2018    Minerva Blanco    MRN: [id number]           After Visit Summary Signature Page     I have received my discharge instructions, and my questions have been answered. I have discussed any challenges I see with this plan with the nurse or doctor.    ..........................................................................................................................................  Patient/Patient Representative Signature      ..........................................................................................................................................  Patient Representative Print Name and Relationship to Patient    ..................................................               ................................................  Date                                            Time    ..........................................................................................................................................  Reviewed by Signature/Title    ...................................................              ..............................................  Date                                                            Time

## 2018-02-12 NOTE — OR NURSING
Pt meeting phase one completion criteria @ 1000.   Cont alert and oriented times 4.  Rating pain as tolerable in throat.  Lungs cont equal and clear upper lobes - lower lobes remain diminished bi lat.  Tolerating wean to room air.  SBAR Hand off report as noted   .

## 2018-02-12 NOTE — OR NURSING
Pt states she drinks a little bit of coffee and has popsicles po.  All meds through J tube and supplemental nutrition.  Needs vascular for PIV.  [place] left ear.

## 2018-02-12 NOTE — ANESTHESIA POSTPROCEDURE EVALUATION
Patient: Minerva Blanco    Procedure(s):  Esophagogastroduodenoscopy With Stent Removal - Wound Class: II-Clean Contaminated    Diagnosis:Anastomotic Leak Following Esophagectomy  Diagnosis Additional Information: No value filed.    Anesthesia Type:  General, RSI, ETT    Note:  Anesthesia Post Evaluation    Patient location during evaluation: PACU  Patient participation: Able to fully participate in evaluation  Level of consciousness: awake and alert  Pain management: adequate  Airway patency: patent  Cardiovascular status: acceptable  Respiratory status: acceptable  Hydration status: acceptable  PONV: none     Anesthetic complications: None    Comments: Notes a sore throat, but otherwise doing well.        Last vitals:  Vitals:    02/12/18 0855 02/12/18 0910 02/12/18 0925   BP: 123/75 133/80 138/70   Resp: 11 14    Temp: 36.9  C (98.4  F)  36.7  C (98.1  F)   SpO2: 100% 100% 98%         Electronically Signed By: Max Pettit MD  February 12, 2018  9:43 AM

## 2018-02-12 NOTE — OR NURSING
Difficult IV, Vasc access attempted and was unable.  PIV placed after several attempts by others. Placed by Dr Pettit.

## 2018-02-12 NOTE — BRIEF OP NOTE
[facility], [place]    Brief Operative Note    Pre-operative diagnosis: Anastomotic Leak Following Esophagectomy  Post-operative diagnosis * No post-op diagnosis entered *  Procedure: Procedure(s):  Esophagogastroduodenoscopy With Stent Removal - Wound Class: II-Clean Contaminated  Surgeon: Surgeon(s) and Role:     * Jens Wise MD - Primary     * Gemma Monroe MD - Assisting     * Saul Rogers PA-C - Assisting  Anesthesia: General   Estimated blood loss: Minimal  Drains: None  Specimens: * No specimens in log *  Findings:   Persistent tiny esophagocutaneous fistula.  Complications: None.  Implants: None.    Saul Rogers PA-C  P: [phone]

## 2018-02-12 NOTE — DISCHARGE INSTRUCTIONS
[facility]  Same-Day Surgery   Adult Discharge Orders & Instructions     For 24 hours after surgery    1. Get plenty of rest.  A responsible adult must stay with you for at least 24 hours after you leave the hospital.   2. Do not drive or use heavy equipment.  If you have weakness or tingling, don't drive or use heavy equipment until this feeling goes away.  3. Do not drink alcohol.  4. Avoid strenuous or risky activities.  Ask for help when climbing stairs.   5. You may feel lightheaded.  IF so, sit for a few minutes before standing.  Have someone help you get up.   6. If you have nausea (feel sick to your stomach): Drink only clear liquids such as apple juice, ginger ale, broth or 7-Up.  Rest may also help.  Be sure to drink enough fluids.  Move to a regular diet as you feel able.  7. You may have a slight fever. Call the doctor if your fever is over 100 F (37.7 C) (taken under the tongue) or lasts longer than 24 hours.  8. You may have a dry mouth, a sore throat, muscle aches or trouble sleeping.  These should go away after 24 hours.  9. Do not make important or legal decisions.   Call your doctor for any of the followin.  Signs of infection (fever, growing tenderness at the surgery site, a large amount of drainage or bleeding, severe pain, foul-smelling drainage, redness, swelling).    2. It has been over 8 to 10 hours since surgery and you are still not able to urinate (pass water).    3.  Headache for over 24 hours.      To contact a doctor, call Dr. Wise's office at [phone] or:        [phone] and ask for the resident on call for   Thoracic (answered 24 hours a day)      Emergency Department:    [facility]: [phone]       (TTY for hearing impaired: [phone])

## 2018-02-13 NOTE — OP NOTE
Procedure Date: 2018      PREOPERATIVE DIAGNOSIS:  Esophagocutaneous fistula, status post retrosternal gastric pull-up.      POSTOPERATIVE DIAGNOSIS:   Esophagocutaneous fistula, status post retrosternal gastric pull-up.       PROCEDURE:  Endoscopy with stent removal.      SURGEON:  Nirali Lopez MD (present and participated in the entire procedure).      RESIDENT SURGEON:  Gemma Sosa MD      FIRST ASSISTANT:  Mike Rogers PA-C (Mike Rogers participated in the endoscopy under my supervision).      DESCRIPTION OF PROCEDURE:  With the patient in supine position under general anesthesia, we performed an endoscopy and identified the stent.  We then removed it, but it was somewhat challenging to remove it, it was more scarred in than I expected based on her previous endoscopies.  Once we removed it, the mucosa of the esophagus looked quite inflamed, and there was some granulation on the stomach on the distal end of the stent, but no evidence of perforation.  The opening of the fistula tract is quite narrow.  We did have bubbling at the site of the fistula on the skin.      The patient tolerated the procedure well.         NIRALI LOPEZ MD             D: 2018   T: 2018   MT: DT      Name:     FAVIAN MALONE   MRN:      [id number]        Account:        [id number]   :      1945           Procedure Date: 2018      Document: [id number]       cc: [facility] Surgery Billing

## 2018-02-14 ENCOUNTER — AMBULATORY - HEALTHEAST (OUTPATIENT)
Dept: PALLIATIVE MEDICINE | Facility: OTHER | Age: 73
End: 2018-02-14

## 2018-02-14 ENCOUNTER — COMMUNICATION - HEALTHEAST (OUTPATIENT)
Dept: PHARMACY | Facility: HOSPITAL | Age: 73
End: 2018-02-14

## 2018-02-14 ENCOUNTER — OFFICE VISIT (OUTPATIENT)
Dept: SURGERY | Facility: CLINIC | Age: 73
End: 2018-02-14
Attending: THORACIC SURGERY (CARDIOTHORACIC VASCULAR SURGERY)
Payer: MEDICARE

## 2018-02-14 ENCOUNTER — COMMUNICATION - HEALTHEAST (OUTPATIENT)
Dept: PALLIATIVE MEDICINE | Facility: OTHER | Age: 73
End: 2018-02-14

## 2018-02-14 VITALS
HEIGHT: 60 IN | TEMPERATURE: 98.1 F | DIASTOLIC BLOOD PRESSURE: 70 MMHG | OXYGEN SATURATION: 97 % | SYSTOLIC BLOOD PRESSURE: 137 MMHG | BODY MASS INDEX: 19.97 KG/M2 | HEART RATE: 80 BPM | WEIGHT: 101.7 LBS

## 2018-02-14 DIAGNOSIS — G89.4 CHRONIC PAIN SYNDROME: ICD-10-CM

## 2018-02-14 DIAGNOSIS — G89.29 CHRONIC INTRACTABLE PAIN: ICD-10-CM

## 2018-02-14 DIAGNOSIS — S21.109A OPEN WOUND OF CHEST WALL: ICD-10-CM

## 2018-02-14 DIAGNOSIS — K22.89 ESOPHAGEAL FISTULA: Primary | ICD-10-CM

## 2018-02-14 PROCEDURE — G0463 HOSPITAL OUTPT CLINIC VISIT: HCPCS | Mod: ZF

## 2018-02-14 RX ORDER — OXYCODONE HYDROCHLORIDE 10 MG/1
10 TABLET ORAL
COMMUNITY
Start: 2018-02-14 | End: 2018-03-16

## 2018-02-14 ASSESSMENT — PAIN SCALES - GENERAL: PAINLEVEL: MODERATE PAIN (5)

## 2018-02-14 NOTE — PROGRESS NOTES
THORACIC SURGERY FOLLOW UP VISIT    Dear Dr. Carroll,  I saw Ms. Minerva Blanco in follow-up today. The clinical summary follows:      DIAGNOSES   1) Chronic esophageal leak post repair of hiatal hernia with mediastinal phlegmon  2) Esophageal-cutaneous fistula post-retrosternal pull-up.     PROCEDURES   Outside institution (1/2016): laparoscopic hiatal hernia repair with Toupet fundoplication    [facility] (as of 4/2016):    PEG-GJ tube placement, multiple endoscopies and pharyngostomy tube placement (for drainage of mediastinal phlegmon/cavity)    Esophageal stent placement, attempted placement of biliary stent into the paraesophageal cavity (7/22/2016)    Esophageal stent removal, placement of retrograde gastroesophageal tube (10/23/2016)    1/9/2017  1.  Laparoscopic proximal gastrectomy and gastrostomy tube placement.    2.  Left thoracotomy with excision of mediastinal phlegmon and distal esophagectomy.    3.  Thoracic duct ligation.     4.  Left esophageal spit fistula.   4/17/2017  Laparoscopic retrosternal gastric pull-up, J-tube   6/9/2017  1. Esophagogastroscopy  2. Esophageal stent placement      7/17/2017  1. Esophagogastroduodenoscopy with stent exchange  2. Pharyngostomy tube revision    Multiple EGD, stent revisions and cauterization of esophagocutaneous fistula (most recent: 11/17/2017)    02/12/18 Esophageal stent removal.      COMPLICATIONS  Thoracotomy wound infection requiring antibiotics  Persistent esophagocutaneous fistula     INTERVAL STUDIES  None     ETOH negative  TOB never smoker  BMI 19     SUBJECTIVE  She comes to clinic after her stent removal a few days ago. Her pain has significantly improved and is feeling better. The wound has completely healed!!! No granulation tissue and no leakage.      From a personal perspective, she comes to clinic with her  Enrique. They are going on a Lazo's dinner date today at [facility].     IMPRESSION (K22.8) Esophageal fistula  (primary  encounter diagnosis)     72 year-old female S/P retrosternal gastric pull-up for chronic esophageal perforation complicated by anastomotic leak and esophagocutaneous fistula.       PLAN  I spent a total of 25 minutes with Ms. Minerva Blanco, more than 50% of which were spent in counseling, coordination of care, and face-to-face time. I reviewed the plan as follows:  1) Cancel scheduled repair  2) Start soft diet today and return to clinic in 1 month.   All questions were answered and the patient and present family were in agreement with the plan.  I appreciate the opportunity to participate in the care of your patient and will keep you updated.  Sincerely,

## 2018-02-14 NOTE — LETTER
2/14/2018       RE: Minerva Blanco  [address]   [place] [address]     Dear Colleague,    Thank you for referring your patient, Minerva Blanco, to the [facility]. Please see a copy of my visit note below.    THORACIC SURGERY FOLLOW UP VISIT    Dear Dr. Carroll,  I saw Ms. Minerva Blanco in follow-up today. The clinical summary follows:      DIAGNOSES   1) Chronic esophageal leak post repair of hiatal hernia with mediastinal phlegmon  2) Esophageal-cutaneous fistula post-retrosternal pull-up.     PROCEDURES   Outside institution (1/2016): laparoscopic hiatal hernia repair with Toupet fundoplication    [facility] (as of 4/2016):    PEG-GJ tube placement, multiple endoscopies and pharyngostomy tube placement (for drainage of mediastinal phlegmon/cavity)    Esophageal stent placement, attempted placement of biliary stent into the paraesophageal cavity (7/22/2016)    Esophageal stent removal, placement of retrograde gastroesophageal tube (10/23/2016)    1/9/2017  1.  Laparoscopic proximal gastrectomy and gastrostomy tube placement.    2.  Left thoracotomy with excision of mediastinal phlegmon and distal esophagectomy.    3.  Thoracic duct ligation.     4.  Left esophageal spit fistula.   4/17/2017  Laparoscopic retrosternal gastric pull-up, J-tube   6/9/2017  1. Esophagogastroscopy  2. Esophageal stent placement      7/17/2017  1. Esophagogastroduodenoscopy with stent exchange  2. Pharyngostomy tube revision    Multiple EGD, stent revisions and cauterization of esophagocutaneous fistula (most recent: 11/17/2017)    02/12/18 Esophageal stent removal.      COMPLICATIONS  Thoracotomy wound infection requiring antibiotics  Persistent esophagocutaneous fistula     INTERVAL STUDIES  None     ETOH negative  TOB never smoker  BMI 19     SUBJECTIVE  She comes to clinic after her stent removal a few days ago. Her pain has significantly improved and is feeling better. The wound has  completely healed!!! No granulation tissue and no leakage.      From a personal perspective, she comes to clinic with her  Enrique. They are going on a Lazo's dinner date today at [facility].     IMPRESSION (K22.8) Esophageal fistula  (primary encounter diagnosis)     72 year-old female S/P retrosternal gastric pull-up for chronic esophageal perforation complicated by anastomotic leak and esophagocutaneous fistula.       PLAN  I spent a total of 25 minutes with Ms. Minerva Blanco, more than 50% of which were spent in counseling, coordination of care, and face-to-face time. I reviewed the plan as follows:  1) Cancel scheduled repair  2) Start soft diet today and return to clinic in 1 month.   All questions were answered and the patient and present family were in agreement with the plan.  I appreciate the opportunity to participate in the care of your patient and will keep you updated.  Sincerely,        Jens Wise MD

## 2018-02-14 NOTE — MR AVS SNAPSHOT
After Visit Summary   2/14/2018    Minerva Blanco    MRN: [id number]           Patient Information     Date Of Birth          1945        Visit Information        Provider Department      2/14/2018 5:30 PM Jens Wise MD [facility]        Today's Diagnoses     Esophageal fistula    -  1       Follow-ups after your visit        Your next 10 appointments already scheduled     Mar 14, 2018  9:00 AM CDT   (Arrive by 8:45 AM)   Return Visit with Jens Wise MD   [facility] ([facility] [place])    [address] [place] [address]  [address]  [facility] [address] [phone]              Who to contact     If you have questions or need follow up information about today's clinic visit or your schedule please contact [facility] directly at [phone].  Normal or non-critical lab and imaging results will be communicated to you by MyChart, letter or phone within 4 business days after the clinic has received the results. If you do not hear from us within 7 days, please contact the clinic through [employer] or phone. If you have a critical or abnormal lab result, we will notify you by phone as soon as possible.  Submit refill requests through [employer] or call your pharmacy and they will forward the refill request to us. Please allow 3 business days for your refill to be completed.          Additional Information About Your Visit        MyChart Information     [employer] gives you secure access to your electronic health record. If you see a primary care provider, you can also send messages to your care team and make appointments. If you have questions, please call your primary care clinic.  If you do not have a primary care provider, please call [phone] and they will assist you.        Care EveryWhere ID     This is your Care EveryWhere ID. This could be used by other organizations to access your [place]  medical records  [id number]        Your Vitals Were     Pulse Temperature Height Pulse Oximetry BMI (Body Mass Index)       80 98.1  F (36.7  C) (Oral) 1.524 m (5') 97% 19.86 kg/m2        Blood Pressure from Last 3 Encounters:   02/14/18 137/70   02/12/18 137/81   02/02/18 124/64    Weight from Last 3 Encounters:   02/14/18 46.1 kg (101 lb 11.2 oz)   02/12/18 45.3 kg (99 lb 13.9 oz)   01/31/18 44.9 kg (99 lb)              Today, you had the following     No orders found for display         Today's Medication Changes          These changes are accurate as of 2/14/18 11:59 PM.  If you have any questions, ask your nurse or doctor.               These medicines have changed or have updated prescriptions.        Dose/Directions    loperamide 1 MG/5ML liquid   Commonly known as:  IMODIUM   This may have changed:  when to take this   Used for:  Bowel habit changes        Dose:  4 mg   Take 20 mLs (4 mg) by mouth 4 times daily as needed for diarrhea   Quantity:  200 mL   Refills:  0       * oxyCODONE IR 5 MG tablet   Commonly known as:  ROXICODONE   This may have changed:  Another medication with the same name was removed. Continue taking this medication, and follow the directions you see here.   Used for:  Esophageal anastomotic leak   Changed by:  Jens Wise MD        Dose:  5 mg   Take 1 tablet (5 mg) by mouth See Admin Instructions One tablet every 4-6 hours as needed for pain   Quantity:  84 tablet   Refills:  0       * oxyCODONE IR 10 MG tablet   Commonly known as:  ROXICODONE   This may have changed:  Another medication with the same name was removed. Continue taking this medication, and follow the directions you see here.   Changed by:  Jens Wise MD        Dose:  10 mg   Take 10 mg by mouth   Refills:  0       traZODone 100 MG tablet   Commonly known as:  DESYREL   This may have changed:  how much to take   Used for:  Insomnia, unspecified type        Dose:  50 mg   0.5 tablets (50  mg) by Per G Tube route At Bedtime   Quantity:  30 tablet   Refills:  0       * Notice:  This list has 2 medication(s) that are the same as other medications prescribed for you. Read the directions carefully, and ask your doctor or other care provider to review them with you.      Stop taking these medicines if you haven't already. Please contact your care team if you have questions.     buprenorphine 10 MCG/HR WK patch   Commonly known as:  BUTRANS   Stopped by:  Jens Wise MD           lidocaine-prilocaine cream   Commonly known as:  EMLA   Stopped by:  Jens Wise MD           pantoprazole 40 MG EC tablet   Commonly known as:  PROTONIX   Stopped by:  Jens Wise MD                    Primary Care Provider Office Phone # Fax #    Andrea Nino [phone]       [place] [address]        Equal Access to Services     [facility]: Hadii davion morgano Sothiago, waaxda luqadaha, qaybta kaalmada adeegyada, florentin amaya . So [facility] [phone].    ATENCIÓN: Si maiala español, tiene a jcakson disposición servicios gratuitos de asistencia lingüística. Sreekanth al [phone].    We comply with applicable federal civil rights laws and [place] laws. We do not discriminate on the basis of race, color, national origin, age, disability, sex, sexual orientation, or gender identity.            Thank you!     Thank you for choosing [facility]  for your care. Our goal is always to provide you with excellent care. Hearing back from our patients is one way we can continue to improve our services. Please take a few minutes to complete the written survey that you may receive in the mail after your visit with us. Thank you!             Your Updated Medication List - Protect others around you: Learn how to safely use, store and throw away your medicines at www.disposemymeds.org.          This  list is accurate as of 2/14/18 11:59 PM.  Always use your most recent med list.                   Brand Name Dispense Instructions for use Diagnosis    acetaminophen 32 mg/mL solution    TYLENOL    300 mL    30.45 mLs (975 mg) by Per Feeding Tube route 3 times daily as needed    Esophageal perforation       BENADRYL PO      Take 25 mg by mouth nightly as needed        cholestyramine 4 G Packet    QUESTRAN     Take 1 packet by mouth daily        CULTURELLE PO      Take 1 tablet by mouth 2 times daily        ferrous sulfate 300 (60 FE) MG/5ML syrup     150 mL    5 mLs (300 mg) by Per J Tube route daily    Esophageal anastomotic leak       gabapentin 400 MG capsule    NEURONTIN     Take 400 mg by mouth 2 times daily        loperamide 1 MG/5ML liquid    IMODIUM    200 mL    Take 20 mLs (4 mg) by mouth 4 times daily as needed for diarrhea    Bowel habit changes       medical cannabis (Patient's own supply.  Not a prescription)      1 Units        MELATONIN PO      Take 5 mg by mouth At Bedtime        multivitamins with minerals Liqd liquid      Take 15 mLs by mouth daily        * order for DME     1 Device    Equipment being ordered:  [facility] Suction Machine-Intermittent Suction Canisters(2) Suction Canister Holders(2) Suction Connect Tube(2) 5 in 1 Connector(2) Bacteria Filter(2) Yaunkauer Suction(2)  Treatment Diagnosis: Esophogeal Perforation, s/p esophagectomy    Hx of esophagectomy       * order for DME     1 Device    Equipment being ordered:  Suction Pump Suction Canister(2) Suction Tubing(2) Bacteria Filter(2) Yaunkauer(4) Red Rubber Catheter(2)  Treatment Diagnosis: Esophogeal Perforation, s/p esophagectomy    Esophageal perforation       * oxyCODONE IR 5 MG tablet    ROXICODONE    84 tablet    Take 1 tablet (5 mg) by mouth See Admin Instructions One tablet every 4-6 hours as needed for pain    Esophageal anastomotic leak       * oxyCODONE IR 10 MG tablet    ROXICODONE     Take 10 mg by mouth        traZODone 100  MG tablet    DESYREL    30 tablet    0.5 tablets (50 mg) by Per G Tube route At Bedtime    Insomnia, unspecified type       UNABLE TO FIND      2 nell supplement 4 cans daily per g tube        * Notice:  This list has 4 medication(s) that are the same as other medications prescribed for you. Read the directions carefully, and ask your doctor or other care provider to review them with you.

## 2018-02-14 NOTE — NURSING NOTE
Oncology Rooming Note    February 14, 2018 5:47 PM   Minerva Blanco is a 72 year old female who presents for:    Chief Complaint   Patient presents with     Oncology Clinic Visit     Follow up-Esophageal Anastomotic Leak     Initial Vitals: /70 (BP Location: Right arm, Patient Position: Sitting, Cuff Size: Adult Regular)  Pulse 80  Temp 98.1  F (36.7  C) (Oral)  Ht 1.524 m (5')  Wt 46.1 kg (101 lb 11.2 oz)  SpO2 97%  BMI 19.86 kg/m2 Estimated body mass index is 19.86 kg/(m^2) as calculated from the following:    Height as of this encounter: 1.524 m (5').    Weight as of this encounter: 46.1 kg (101 lb 11.2 oz). Body surface area is 1.4 meters squared.  Moderate Pain (5) Comment: Left Side   No LMP recorded. Patient is postmenopausal.  Allergies reviewed: Yes  Medications reviewed: Yes    Medications: Medication refills not needed today.  Pharmacy name entered into [email or web address]: [id number] [place], [place] - [address]    Clinical concerns: Questions Dr. Wise was notified.    10 minutes for nursing intake (face to face time)     Doris Matson LPN

## 2018-02-15 ENCOUNTER — TELEPHONE (OUTPATIENT)
Dept: SURGERY | Facility: CLINIC | Age: 73
End: 2018-02-15

## 2018-02-15 NOTE — TELEPHONE ENCOUNTER
Person calling: Patient    Reason for call: symptoms: belching with eating, diarrhea, just started eating again last night.  Wondering if she can get some medication (anti-reflux?) for her symptoms. She also is experiencing soreness in her throat where the stent was removed.    Action taken: routed this encounter to the following provider(s):  Silvia Armstrong NP, Sarina Serna NP, Bridgett Mittal NP and Dr. Wise

## 2018-02-15 NOTE — TELEPHONE ENCOUNTER
"Per Silvia Armstrong: Soreness in throat is not unusual when stent is removed after a long time and patients start swallowing more normally.   Ice chips or lozenges can help.   She can try Gas-X (over the counter) for belching with eating.   Most anti-reflux medications are also over the counter such as generic omeprazole 20 mg (can take it twice a day) or generic pantaprozole-   These are Prilosec and Pepcid branded but generic is less expensive.   Also, diarrhea should get better over time.   Her \"plumbing\" has been altered for a long time and now needs to re-adjust so much of these symptoms will slowly get better, just not immediately!   Can you please call her with above information?   Thanks   Silvia     Called patient back with above information.  She verbalized understanding.  "

## 2018-02-16 ENCOUNTER — TELEPHONE (OUTPATIENT)
Dept: SURGERY | Facility: CLINIC | Age: 73
End: 2018-02-16

## 2018-02-16 ENCOUNTER — ANESTHESIA (OUTPATIENT)
Dept: SURGERY | Facility: CLINIC | Age: 73
End: 2018-02-16

## 2018-02-16 NOTE — TELEPHONE ENCOUNTER
Call from Dr. Dennys Dover at [facility]. He got a call from Minerva yesterday who thought Dr. Wise wanted her switched from her current Butran patch to a fentanyl patch leading up to surgery.    I explained to Dr. Dover that her surgery was actually cancelled and Dr. Wise did not make any recommendations about changing anything regarding her current pain regimen. He defers to the providers at the pain clinic (Dr. Dover and colleagues) for any and all medication adjustments.    Dr. Dover made note of this and was thankful for the clarifying information.

## 2018-02-19 ENCOUNTER — COMMUNICATION - HEALTHEAST (OUTPATIENT)
Dept: PALLIATIVE MEDICINE | Facility: CLINIC | Age: 73
End: 2018-02-19

## 2018-02-19 DIAGNOSIS — G89.4 CHRONIC PAIN SYNDROME: ICD-10-CM

## 2018-02-19 DIAGNOSIS — G89.29 CHRONIC INTRACTABLE PAIN: ICD-10-CM

## 2018-02-26 ENCOUNTER — TELEPHONE (OUTPATIENT)
Dept: SURGERY | Facility: CLINIC | Age: 73
End: 2018-02-26

## 2018-02-26 DIAGNOSIS — K21.9 GASTROESOPHAGEAL REFLUX DISEASE WITHOUT ESOPHAGITIS: Primary | ICD-10-CM

## 2018-02-26 NOTE — TELEPHONE ENCOUNTER
Call from Minerva reporting a lot of reflux. The reflux is causing her to cough and she thinks she aspirated last night. She also has drainage from the j-tube site and the acid in the drainage is causing the site to be painful.    I will have her try omeprazole BID through her j-tube to see if that helps reduce the acid reflux as well as the pain around the site. I instructed her to apply a barrier cream to the site to protect the skin from breakdown related to the gastric drainage.    I will call her PCP to see about getting a CXR to make sure she doesn't have significant aspiration changes in her lungs.     I called Dr. Nino's office but had to leave a message with the . Minerva will also call the office. She will let me know once the CXR is completed so I can get the results for review by Dr. Wise.

## 2018-02-27 ENCOUNTER — RADIANT APPOINTMENT (OUTPATIENT)
Dept: GENERAL RADIOLOGY | Facility: CLINIC | Age: 73
End: 2018-02-27
Attending: CLINICAL NURSE SPECIALIST
Payer: COMMERCIAL

## 2018-02-27 DIAGNOSIS — Z98.890 HX OF ESOPHAGECTOMY: Primary | ICD-10-CM

## 2018-02-27 DIAGNOSIS — Z90.49 HX OF ESOPHAGECTOMY: Primary | ICD-10-CM

## 2018-02-27 DIAGNOSIS — Z98.890 HX OF ESOPHAGECTOMY: ICD-10-CM

## 2018-02-27 DIAGNOSIS — Z90.49 HX OF ESOPHAGECTOMY: ICD-10-CM

## 2018-03-02 ENCOUNTER — HOME INFUSION (PRE-WILLOW HOME INFUSION) (OUTPATIENT)
Dept: PHARMACY | Facility: CLINIC | Age: 73
End: 2018-03-02

## 2018-03-05 ENCOUNTER — COMMUNICATION - HEALTHEAST (OUTPATIENT)
Dept: PALLIATIVE MEDICINE | Facility: OTHER | Age: 73
End: 2018-03-05

## 2018-03-05 ENCOUNTER — HOME INFUSION (PRE-WILLOW HOME INFUSION) (OUTPATIENT)
Dept: PHARMACY | Facility: CLINIC | Age: 73
End: 2018-03-05

## 2018-03-05 DIAGNOSIS — G89.4 CHRONIC PAIN SYNDROME: ICD-10-CM

## 2018-03-05 DIAGNOSIS — G89.29 CHRONIC INTRACTABLE PAIN: ICD-10-CM

## 2018-03-07 ENCOUNTER — HOME INFUSION (PRE-WILLOW HOME INFUSION) (OUTPATIENT)
Dept: PHARMACY | Facility: CLINIC | Age: 73
End: 2018-03-07

## 2018-03-12 NOTE — PROGRESS NOTES
This is a recent snapshot of the patient's [facility] medical record.  For current drug dose and complete information and questions, call [phone] or In Basket pool, [facility] ([id number])  CSN Number:  [id number]

## 2018-03-13 NOTE — PROGRESS NOTES
THORACIC SURGERY FOLLOW UP VISIT    Dear Dr. Carroll,  I saw Ms. Minerva Blanco in follow-up today. The clinical summary follows:     DIAGNOSIS   S/P esophagectomy for end-stage GERD     PROCEDURE   Outside institution (1/2016): laparoscopic hiatal hernia repair with Toupet fundoplication     [facility] (as of 4/2016):     PEG-GJ tube placement, multiple endoscopies and pharyngostomy tube placement (for drainage of mediastinal phlegmon/cavity)     Esophageal stent placement, attempted placement of biliary stent into the paraesophageal cavity (7/22/2016)     Esophageal stent removal, placement of retrograde gastroesophageal tube (10/23/2016)     1/9/2017  1.  Laparoscopic proximal gastrectomy and gastrostomy tube placement.    2.  Left thoracotomy with excision of mediastinal phlegmon and distal esophagectomy.    3.  Thoracic duct ligation.     4.  Left esophageal spit fistula.   4/17/2017  Laparoscopic retrosternal gastric pull-up, J-tube   6/9/2017  1. Esophagogastroscopy  2. Esophageal stent placement      7/17/2017  1. Esophagogastroduodenoscopy with stent exchange  2. Pharyngostomy tube revision     Multiple EGD, stent revisions and cauterization of esophagocutaneous fistula (most recent: 11/17/2017)     02/12/18 Esophageal stent removal.       COMPLICATIONS  Thoracotomy wound infection requiring antibiotics  Persistent esophagocutaneous fistula    INTERVAL STUDIES  CXR (2/27/18): No acute airspace disease    ETOH negative  TOB never smoker  BMI 19.8    SUBJECTIVE  Ms. Blanco returns to clinic today for follow-up of her esophagocutaneous fistula and to see how she has tolerated a soft diet over the last month. Her fistula has remained closed and is healing well. She is tolerating a soft diet and has started decreasing her tube feeds. Her weight is now 108 lbs from 101 lbs. She describes pain and leakage at her J-tube site as well as reflux which she is currently taking BID omeprazole for.  Additionally, she has  been very fatigued over the past 2 weeks and having night sweats, no fever, no trembling, no diarrhea. She is slowly weaning her narcotic pain medication.    From a personal perspective, She is here with her  Enrique AMOR (Z90.49) H/O esophagectomy  (primary encounter diagnosis)    73 year-old female s/p esophagectomy for end-stage GERD  Resolved esophago-cutaneous fistula    PLAN  I spent a total of 15 minutes with Ms. Minerva Blanco and her , Enrique, more than 50% of which were spent in counseling, coordination of care, and face-to-face time. I reviewed the plan as follows:  1. J-tube leakage: We reduced the amount of fluid in her j-tube balloon to 2cc total  2. Reduce tube feeds to half her current amount for 2 weeks. She will return to clinic at this time to assess her nutrition.  3. Narcotic pain medication wean: I asked Mrs. Blanco to talk to her pain provider to see if some of her current symptoms could be secondary to withdrawal  All questions were answered and the patient and present family were in agreement with the plan.  I appreciate the opportunity to participate in the care of your patient and will keep you updated.  Sincerely,

## 2018-03-13 NOTE — PROGRESS NOTES
This is a recent snapshot of the patient's [facility] medical record.  For current drug dose and complete information and questions, call [phone] or In [facility] pool, fv home infusion ([id number])  CSN Number:  [id number]

## 2018-03-14 ENCOUNTER — OFFICE VISIT (OUTPATIENT)
Dept: SURGERY | Facility: CLINIC | Age: 73
End: 2018-03-14
Attending: THORACIC SURGERY (CARDIOTHORACIC VASCULAR SURGERY)
Payer: MEDICARE

## 2018-03-14 VITALS
BODY MASS INDEX: 21.11 KG/M2 | SYSTOLIC BLOOD PRESSURE: 113 MMHG | RESPIRATION RATE: 16 BRPM | OXYGEN SATURATION: 98 % | TEMPERATURE: 98.5 F | DIASTOLIC BLOOD PRESSURE: 72 MMHG | HEART RATE: 98 BPM | WEIGHT: 108.1 LBS

## 2018-03-14 DIAGNOSIS — Z98.890 H/O ESOPHAGECTOMY: Primary | ICD-10-CM

## 2018-03-14 DIAGNOSIS — Z90.49 H/O ESOPHAGECTOMY: Primary | ICD-10-CM

## 2018-03-14 PROCEDURE — 40000114 ZZH STATISTIC NO CHARGE CLINIC VISIT

## 2018-03-14 ASSESSMENT — PAIN SCALES - GENERAL: PAINLEVEL: SEVERE PAIN (7)

## 2018-03-14 NOTE — LETTER
3/14/2018      RE: Minerva Blanco  [address]   [place] [address]       THORACIC SURGERY FOLLOW UP VISIT    Dear Dr. Carroll,  I saw Ms. Minerva Blanco in follow-up today. The clinical summary follows:     DIAGNOSIS   S/P esophagectomy for end-stage GERD     PROCEDURE   Outside institution (1/2016): laparoscopic hiatal hernia repair with Toupet fundoplication     [facility] (as of 4/2016):     PEG-GJ tube placement, multiple endoscopies and pharyngostomy tube placement (for drainage of mediastinal phlegmon/cavity)     Esophageal stent placement, attempted placement of biliary stent into the paraesophageal cavity (7/22/2016)     Esophageal stent removal, placement of retrograde gastroesophageal tube (10/23/2016)     1/9/2017  1.  Laparoscopic proximal gastrectomy and gastrostomy tube placement.    2.  Left thoracotomy with excision of mediastinal phlegmon and distal esophagectomy.    3.  Thoracic duct ligation.     4.  Left esophageal spit fistula.   4/17/2017  Laparoscopic retrosternal gastric pull-up, J-tube   6/9/2017  1. Esophagogastroscopy  2. Esophageal stent placement      7/17/2017  1. Esophagogastroduodenoscopy with stent exchange  2. Pharyngostomy tube revision     Multiple EGD, stent revisions and cauterization of esophagocutaneous fistula (most recent: 11/17/2017)     02/12/18 Esophageal stent removal.       COMPLICATIONS  Thoracotomy wound infection requiring antibiotics  Persistent esophagocutaneous fistula    INTERVAL STUDIES  CXR (2/27/18): No acute airspace disease    ETOH negative  TOB never smoker  BMI 19.8    SUBJECTIVE  Ms. Blanco returns to clinic today for follow-up of her esophagocutaneous fistula and to see how she has tolerated a soft diet over the last month. Her fistula has remained closed and is healing well. She is tolerating a soft diet and has started decreasing her tube feeds. Her weight is now 108 lbs from 101 lbs. She describes pain and leakage at her J-tube  site as well as reflux which she is currently taking BID omeprazole for.  Additionally, she has been very fatigued over the past 2 weeks and having night sweats, no fever, no trembling, no diarrhea. She is slowly weaning her narcotic pain medication.    From a personal perspective, She is here with her  Enrique AMOR (Z90.49) H/O esophagectomy  (primary encounter diagnosis)    73 year-old female s/p esophagectomy for end-stage GERD  Resolved esophago-cutaneous fistula    PLAN  I spent a total of 15 minutes with Ms. Minerva Blanco and her , Enrique, more than 50% of which were spent in counseling, coordination of care, and face-to-face time. I reviewed the plan as follows:  1. J-tube leakage: We reduced the amount of fluid in her j-tube balloon to 2cc total  2. Reduce tube feeds to half her current amount for 2 weeks. She will return to clinic at this time to assess her nutrition.  3. Narcotic pain medication wean: I asked Mrs. Blanco to talk to her pain provider to see if some of her current symptoms could be secondary to withdrawal  All questions were answered and the patient and present family were in agreement with the plan.  I appreciate the opportunity to participate in the care of your patient and will keep you updated.  Sincerely,      Jens Wise MD

## 2018-03-14 NOTE — MR AVS SNAPSHOT
After Visit Summary   3/14/2018    Mienrva Blanco    MRN: [id number]           Patient Information     Date Of Birth          1945        Visit Information        Provider Department      3/14/2018 9:00 AM Jens Wise MD [facility]        Today's Diagnoses     H/O esophagectomy    -  1       Follow-ups after your visit        Follow-up notes from your care team     Return in about 2 weeks (around 3/28/2018).      Your next 10 appointments already scheduled     Mar 28, 2018 12:00 PM CDT   (Arrive by 11:45 AM)   Return Visit with Jens Wise MD   [facility] ([facility] [place])    [address]  [address]  [facility] [address] [phone]              Who to contact     If you have questions or need follow up information about today's clinic visit or your schedule please contact [facility] directly at [phone].  Normal or non-critical lab and imaging results will be communicated to you by [employer], letter or phone within 4 business days after the clinic has received the results. If you do not hear from us within 7 days, please contact the clinic through [employer] or phone. If you have a critical or abnormal lab result, we will notify you by phone as soon as possible.  Submit refill requests through [employer] or call your pharmacy and they will forward the refill request to us. Please allow 3 business days for your refill to be completed.          Additional Information About Your Visit        [employer] Information     [employer] gives you secure access to your electronic health record. If you see a primary care provider, you can also send messages to your care team and make appointments. If you have questions, please call your primary care clinic.  If you do not have a primary care provider, please call [phone] and they will assist you.        Care EveryWhere ID     This is your  Care EveryWhere ID. This could be used by other organizations to access your [place] medical records  [id number]        Your Vitals Were     Pulse Temperature Respirations Pulse Oximetry BMI (Body Mass Index)       98 98.5  F (36.9  C) (Oral) 16 98% 21.11 kg/m2        Blood Pressure from Last 3 Encounters:   03/14/18 113/72   02/14/18 137/70   02/12/18 137/81    Weight from Last 3 Encounters:   03/14/18 49 kg (108 lb 1.6 oz)   02/14/18 46.1 kg (101 lb 11.2 oz)   02/12/18 45.3 kg (99 lb 13.9 oz)              Today, you had the following     No orders found for display         Today's Medication Changes          These changes are accurate as of 3/14/18 10:31 AM.  If you have any questions, ask your nurse or doctor.               These medicines have changed or have updated prescriptions.        Dose/Directions    loperamide 1 MG/5ML liquid   Commonly known as:  IMODIUM   This may have changed:  when to take this   Used for:  Bowel habit changes        Dose:  4 mg   Take 20 mLs (4 mg) by mouth 4 times daily as needed for diarrhea   Quantity:  200 mL   Refills:  0       traZODone 100 MG tablet   Commonly known as:  DESYREL   This may have changed:  how much to take   Used for:  Insomnia, unspecified type        Dose:  50 mg   0.5 tablets (50 mg) by Per G Tube route At Bedtime   Quantity:  30 tablet   Refills:  0                Primary Care Provider Office Phone # Fax #    Andrea Nino [phone]       [place] [address]        Equal Access to Services     [facility]: Hadii davion shannon Sothiago, waaxda luqadaha, qaybta kaalflorentin benedict idilori saldivar. So [facility] [phone].    ATENCIÓN: Si habla español, tiene a jackson disposición servicios gratuitos de asistencia lingüística. Llame al [phone].    We comply with applicable federal civil rights laws and [place] laws. We do not discriminate on the basis of race, color,  national origin, age, disability, sex, sexual orientation, or gender identity.            Thank you!     Thank you for choosing [facility]  for your care. Our goal is always to provide you with excellent care. Hearing back from our patients is one way we can continue to improve our services. Please take a few minutes to complete the written survey that you may receive in the mail after your visit with us. Thank you!             Your Updated Medication List - Protect others around you: Learn how to safely use, store and throw away your medicines at www.disposemymeds.org.          This list is accurate as of 3/14/18 10:31 AM.  Always use your most recent med list.                   Brand Name Dispense Instructions for use Diagnosis    acetaminophen 32 mg/mL solution    TYLENOL    300 mL    30.45 mLs (975 mg) by Per Feeding Tube route 3 times daily as needed    Esophageal perforation       BENADRYL PO      Take 25 mg by mouth nightly as needed        cholestyramine 4 G Packet    QUESTRAN     Take 1 packet by mouth daily        CULTURELLE PO      Take 1 tablet by mouth 2 times daily        ferrous sulfate 300 (60 FE) MG/5ML syrup     150 mL    5 mLs (300 mg) by Per J Tube route daily    Esophageal anastomotic leak       loperamide 1 MG/5ML liquid    IMODIUM    200 mL    Take 20 mLs (4 mg) by mouth 4 times daily as needed for diarrhea    Bowel habit changes       medical cannabis (Patient's own supply.  Not a prescription)      1 Units        MELATONIN PO      Take 5 mg by mouth At Bedtime        multivitamins with minerals Liqd liquid      Take 15 mLs by mouth daily        omeprazole 2 mg/mL Susp    priLOSEC    400 mL    Take 10 mLs (20 mg) by mouth 2 times daily    Gastroesophageal reflux disease without esophagitis       * order for DME     1 Device    Equipment being ordered:  [facility] [place] [facility] Suction Machine-Intermittent Suction Canisters(2) Suction Canister Holders(2) Suction Connect Tube(2) 5 in 1  Connector(2) Bacteria Filter(2) Yaunkauer Suction(2)  Treatment Diagnosis: Esophogeal Perforation, s/p esophagectomy    Hx of esophagectomy       * order for DME     1 Device    Equipment being ordered:  Suction Pump Suction Canister(2) Suction Tubing(2) Bacteria Filter(2) Yaunkauer(4) Red Rubber Catheter(2)  Treatment Diagnosis: Esophogeal Perforation, s/p esophagectomy    Esophageal perforation       * oxyCODONE IR 5 MG tablet    ROXICODONE    84 tablet    Take 1 tablet (5 mg) by mouth See Admin Instructions One tablet every 4-6 hours as needed for pain    Esophageal anastomotic leak       * oxyCODONE IR 10 MG tablet    ROXICODONE     Take 10 mg by mouth        traZODone 100 MG tablet    DESYREL    30 tablet    0.5 tablets (50 mg) by Per G Tube route At Bedtime    Insomnia, unspecified type       UNABLE TO FIND      2 nell supplement 4 cans daily per g tube        * Notice:  This list has 4 medication(s) that are the same as other medications prescribed for you. Read the directions carefully, and ask your doctor or other care provider to review them with you.

## 2018-03-16 NOTE — PROGRESS NOTES
This is a recent snapshot of the patient's [facility] medical record.  For current drug dose and complete information and questions, call [phone] or In [facility] pool, fv home infusion ([id number])  CSN Number:  [id number]

## 2018-03-20 ENCOUNTER — HOME INFUSION (PRE-WILLOW HOME INFUSION) (OUTPATIENT)
Dept: PHARMACY | Facility: CLINIC | Age: 73
End: 2018-03-20

## 2018-03-20 ENCOUNTER — HOSPITAL ENCOUNTER (OUTPATIENT)
Dept: PALLIATIVE MEDICINE | Facility: OTHER | Age: 73
Discharge: HOME | End: 2018-03-20
Attending: NURSE PRACTITIONER

## 2018-03-20 DIAGNOSIS — G89.29 CHRONIC INTRACTABLE PAIN: ICD-10-CM

## 2018-03-20 DIAGNOSIS — G89.4 CHRONIC PAIN SYNDROME: ICD-10-CM

## 2018-03-20 ASSESSMENT — MIFFLIN-ST. JEOR: SCORE: 883.7

## 2018-03-21 ENCOUNTER — OFFICE VISIT (OUTPATIENT)
Dept: SURGERY | Facility: CLINIC | Age: 73
End: 2018-03-21
Attending: THORACIC SURGERY (CARDIOTHORACIC VASCULAR SURGERY)
Payer: MEDICARE

## 2018-03-21 ENCOUNTER — TELEPHONE (OUTPATIENT)
Dept: SURGERY | Facility: CLINIC | Age: 73
End: 2018-03-21

## 2018-03-21 DIAGNOSIS — Z93.4 JEJUNOSTOMY TUBE PRESENT (H): Primary | ICD-10-CM

## 2018-03-21 PROCEDURE — 40000114 ZZH STATISTIC NO CHARGE CLINIC VISIT

## 2018-03-21 NOTE — PROGRESS NOTES
I met Ms Blanco in clinic today for removal of her J tube. She told me she was excited to have it taken out.     I removed the J tube without incident, and covered it with gauze and occlusive tape. We discussed bathing, and I advised her to wait 2-4 weeks before bathing. Her skin should heal over before she starts to soak the wound. She can remove the bandage she has in 48 hours and then wash it with soap and water.    All of her and her 's questions were answered at the end of the visit.    I spent 15 minutes with the patient, all of which was spent in counseling of care.    --  Ariel Brar MD  Gen Surg PGY1      I did not see Mrs. Blanco, she was seen exclusively by Dr. Brar who removed the J tube after we reviewed her case.

## 2018-03-21 NOTE — MR AVS SNAPSHOT
After Visit Summary   3/21/2018    Minerva Blanco    MRN: [id number]           Patient Information     Date Of Birth          1945        Visit Information        Provider Department      3/21/2018 10:30 AM Jens Wise MD [facility]        Today's Diagnoses     Jejunostomy tube present (H)    -  1       Follow-ups after your visit        Who to contact     If you have questions or need follow up information about today's clinic visit or your schedule please contact [facility] directly at [phone].  Normal or non-critical lab and imaging results will be communicated to you by Footbalistichart, letter or phone within 4 business days after the clinic has received the results. If you do not hear from us within 7 days, please contact the clinic through [employer] or phone. If you have a critical or abnormal lab result, we will notify you by phone as soon as possible.  Submit refill requests through [employer] or call your pharmacy and they will forward the refill request to us. Please allow 3 business days for your refill to be completed.          Additional Information About Your Visit        MyChart Information     [employer] gives you secure access to your electronic health record. If you see a primary care provider, you can also send messages to your care team and make appointments. If you have questions, please call your primary care clinic.  If you do not have a primary care provider, please call [phone] and they will assist you.        Care EveryWhere ID     This is your Care EveryWhere ID. This could be used by other organizations to access your [place] medical records  [id number]         Blood Pressure from Last 3 Encounters:   03/14/18 113/72   02/14/18 137/70   02/12/18 137/81    Weight from Last 3 Encounters:   03/14/18 49 kg (108 lb 1.6 oz)   02/14/18 46.1 kg (101 lb 11.2 oz)   02/12/18 45.3 kg (99 lb 13.9 oz)              Today, you had  the following     No orders found for display         Today's Medication Changes          These changes are accurate as of 3/21/18 11:33 AM.  If you have any questions, ask your nurse or doctor.               These medicines have changed or have updated prescriptions.        Dose/Directions    loperamide 1 MG/5ML liquid   Commonly known as:  IMODIUM   This may have changed:  when to take this   Used for:  Bowel habit changes        Dose:  4 mg   Take 20 mLs (4 mg) by mouth 4 times daily as needed for diarrhea   Quantity:  200 mL   Refills:  0       traZODone 100 MG tablet   Commonly known as:  DESYREL   This may have changed:  how much to take   Used for:  Insomnia, unspecified type        Dose:  50 mg   0.5 tablets (50 mg) by Per G Tube route At Bedtime   Quantity:  30 tablet   Refills:  0                Primary Care Provider Office Phone # Fax #    Andrea Nino [phone]       [facility] [address] [address]        Equal Access to Services     [facility] Campus[facility] : Hadii davion morgano Sothiago, waaxda luqadaha, qaybta kaalmada adechavezyasana, florentin amaya . So [facility] [phone].    ATENCIÓN: Si habla español, tiene a jackson disposición servicios gratuitos de asistencia lingüística. [facility] [phone].    We comply with applicable federal civil rights laws and [place] laws. We do not discriminate on the basis of race, color, national origin, age, disability, sex, sexual orientation, or gender identity.            Thank you!     Thank you for choosing [facility]  for your care. Our goal is always to provide you with excellent care. Hearing back from our patients is one way we can continue to improve our services. Please take a few minutes to complete the written survey that you may receive in the mail after your visit with us. Thank you!             Your Updated Medication List - Protect others around you: Learn how to safely use,  store and throw away your medicines at www.disposemymeds.org.          This list is accurate as of 3/21/18 11:33 AM.  Always use your most recent med list.                   Brand Name Dispense Instructions for use Diagnosis    acetaminophen 32 mg/mL solution    TYLENOL    300 mL    30.45 mLs (975 mg) by Per Feeding Tube route 3 times daily as needed    Esophageal perforation       BENADRYL PO      Take 25 mg by mouth nightly as needed        cholestyramine 4 G Packet    QUESTRAN     Take 1 packet by mouth daily        CULTURELLE PO      Take 1 tablet by mouth 2 times daily        ferrous sulfate 300 (60 FE) MG/5ML syrup     150 mL    5 mLs (300 mg) by Per J Tube route daily    Esophageal anastomotic leak       loperamide 1 MG/5ML liquid    IMODIUM    200 mL    Take 20 mLs (4 mg) by mouth 4 times daily as needed for diarrhea    Bowel habit changes       medical cannabis (Patient's own supply.  Not a prescription)      1 Units        MELATONIN PO      Take 5 mg by mouth At Bedtime        multivitamins with minerals Liqd liquid      Take 15 mLs by mouth daily        omeprazole 2 mg/mL Susp    priLOSEC    400 mL    Take 10 mLs (20 mg) by mouth 2 times daily    Gastroesophageal reflux disease without esophagitis       * order for DME     1 Device    Equipment being ordered:  [facility] Suction Machine-Intermittent Suction Canisters(2) Suction Canister Holders(2) Suction Connect Tube(2) 5 in 1 Connector(2) Bacteria Filter(2) Yaunkauer Suction(2)  Treatment Diagnosis: Esophogeal Perforation, s/p esophagectomy    Hx of esophagectomy       * order for DME     1 Device    Equipment being ordered:  Suction Pump Suction Canister(2) Suction Tubing(2) Bacteria Filter(2) Yaunkauer(4) Red Rubber Catheter(2)  Treatment Diagnosis: Esophogeal Perforation, s/p esophagectomy    Esophageal perforation       oxyCODONE IR 5 MG tablet    ROXICODONE    84 tablet    Take 1 tablet (5 mg) by mouth See Admin Instructions One tablet every 4-6 hours  as needed for pain    Esophageal anastomotic leak       traZODone 100 MG tablet    DESYREL    30 tablet    0.5 tablets (50 mg) by Per G Tube route At Bedtime    Insomnia, unspecified type       UNABLE TO FIND      2 nell supplement 4 cans daily per g tube        * Notice:  This list has 2 medication(s) that are the same as other medications prescribed for you. Read the directions carefully, and ask your doctor or other care provider to review them with you.

## 2018-03-21 NOTE — TELEPHONE ENCOUNTER
Call from Minerva stating her j-tube pain is unbearable and she does not think she can wait another week to see Dr. Wise. As instructed last week, she reduced her tube feedings to 2 cans per day (reduced by 50%). She has maintained her weight in spite of decreasing her tube feedings.    I reviewed this with Dr. Wise and he says he is fine with her coming in today for one of the residents to remove the j-tube.    Minerva is in agreement with this plan and will have her , Enrique, bring her in this morning.

## 2018-03-21 NOTE — LETTER
3/21/2018       RE: Minerva Blanco  [address]   [place] [address]     Dear Colleague,    Thank you for referring your patient, Minerva Blanco, to [facility]. Please see a copy of my visit note below.    I met Ms Blanco in clinic today for removal of her J tube. She told me she was excited to have it taken out.     I removed the J tube without incident, and covered it with gauze and occlusive tape. We discussed bathing, and I advised her to wait 2-4 weeks before bathing. Her skin should heal over before she starts to soak the wound. She can remove the bandage she has in 48 hours and then wash it with soap and water.    All of her and her 's questions were answered at the end of the visit.    I spent 15 minutes with the patient, all of which was spent in counseling of care.    --  Ariel Brar MD  Gen Surg PGY1      Again, thank you for allowing me to participate in the care of your patient.      Sincerely,    Jens Wise MD

## 2018-03-26 ENCOUNTER — TELEPHONE (OUTPATIENT)
Dept: ONCOLOGY | Facility: CLINIC | Age: 73
End: 2018-03-26

## 2018-03-26 NOTE — TELEPHONE ENCOUNTER
"Person calling: ROMAINE Whitney    Reason for call: symptoms:     Home care RN Shannon called in to triage reporting large amounts of yellow drainage from site of J-tube removal. Pt reported since J-tube was removed on 3/21, she has been saturating dressings every 15-30 minutes. She feels every time she eats, then sits down, drainage \"spurts out\". She is eating solids and has had mild diarrhea. Denied pain, redness, swelling at site.    Action taken: paged the following provider(s):  Silvia Armstrong NP, Sarina Serna NP and Bridgett Mittal NP routed this encounter to the following provider(s):  Silvia Armstrong NP, Sarina Serna NP and Bridgett Mittal NP    I called and spoke with the Home Care Nurse. She states that the excessive leaking occurs after meals and does subside. The RN states the patient has plenty of dressing supplies and the patient has an appt with Dr. Wise on Wednesday. I asked the RN if she felt that after her assessment and evaluation today if the patient needed to be seen prior to her appt with Dr. Wise on Wednesday, she stated that it could wait.  Pt to keep already scheduled appt.  Bridgett Mittal CNP  "

## 2018-03-28 ENCOUNTER — OFFICE VISIT (OUTPATIENT)
Dept: SURGERY | Facility: CLINIC | Age: 73
End: 2018-03-28
Attending: THORACIC SURGERY (CARDIOTHORACIC VASCULAR SURGERY)
Payer: MEDICARE

## 2018-03-28 VITALS
SYSTOLIC BLOOD PRESSURE: 134 MMHG | RESPIRATION RATE: 16 BRPM | BODY MASS INDEX: 20.03 KG/M2 | HEART RATE: 87 BPM | TEMPERATURE: 98.2 F | OXYGEN SATURATION: 98 % | DIASTOLIC BLOOD PRESSURE: 86 MMHG | WEIGHT: 102 LBS | HEIGHT: 60 IN

## 2018-03-28 PROCEDURE — G0463 HOSPITAL OUTPT CLINIC VISIT: HCPCS | Mod: ZF

## 2018-03-28 ASSESSMENT — PAIN SCALES - GENERAL: PAINLEVEL: NO PAIN (0)

## 2018-03-28 NOTE — LETTER
3/28/2018       RE: Minerva Blanco  [address]   [place] [address]     Dear Colleague,    Thank you for referring your patient, Minerva Blanco, to the [facility]. Please see a copy of my visit note below.    THORACIC SURGERY FOLLOW UP VISIT    THORACIC & FOREGUT SURGERY    S: Pt had J-Tube removed 03/21/18. Since that time, she has had an excessive amount of drainage and she feels the J-Tube site is not healing.  She continues to endorse erythema and swelling around site that she believes is caused from all of the drainage.  She also complains to abdominal cramping and loose stools/diarrhea that have caused her to decrease her PO intake.       O:  Vitals:    03/28/18 1206   BP: 134/86   Pulse: 87   Resp: 16   Temp: 98.2  F (36.8  C)   TempSrc: Oral   SpO2: 98%   Weight: 46.3 kg (102 lb)   Height: 1.524 m (5')       J-tube site inspected. The surrounding tissue was erythematous and slightly tender to the touch.  The J tube insertion site did not seem to be healing over.    The J tube site was cleaned with betadine and 1 % Lidocaine was used as a local anaesthetic. 2.0 Vicryl sutures were used to close insertion without complication and a dressing was applied to the area.    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     On 03/21/18 her J tube was removed.  However insertion site not seeming to heal after one week.    -Two vicryl sutures were placed to the insertion site.   -A Dumping Syndrome Diet was provided to the patient.   -The patient will contact our office to schedule follow up.    Case discussed with Dr. Wise.     CLIVE Mccall, CNP  Thoracic and Forgut Surgery  [facility]

## 2018-03-28 NOTE — NURSING NOTE
Oncology Rooming Note    March 28, 2018 12:07 PM   Minerva Blanco is a 73 year old female who presents for:    Chief Complaint   Patient presents with     Oncology Clinic Visit     return patient visit for 2 week follow up related to Jejunostomy tube present (H)      Initial Vitals: /86  Pulse 87  Temp 98.2  F (36.8  C) (Oral)  Resp 16  Ht 1.524 m (5')  Wt 49.2 kg (108 lb 8 oz)  SpO2 98%  Breastfeeding? No  BMI 21.19 kg/m2 Estimated body mass index is 21.19 kg/(m^2) as calculated from the following:    Height as of this encounter: 1.524 m (5').    Weight as of this encounter: 49.2 kg (108 lb 8 oz). Body surface area is 1.44 meters squared.  No Pain (0) Comment: Data Unavailable   No LMP recorded. Patient is postmenopausal.  Allergies reviewed: Yes  Medications reviewed: Yes    Medications: Medication refills not needed today.  Pharmacy name entered into [employer]: [id number] [place], [place] - [address] [place] [address]  S    Clinical concerns: leaking where tube was inserted, back cramps, and diarrhea dr. silver was notified.    6 minutes for nursing intake (face to face time)     Dalton Jacob CMA

## 2018-03-28 NOTE — MR AVS SNAPSHOT
After Visit Summary   3/28/2018    Minerva Blanco    MRN: [id number]           Patient Information     Date Of Birth          1945        Visit Information        Provider Department      3/28/2018 12:00 PM Shellie Mittal APRN CNP [facility]        Today's Diagnoses     History of jejunostomy tube placement    -  1       Follow-ups after your visit        Follow-up notes from your care team     Return in about 3 months (around 6/28/2018).      Who to contact     If you have questions or need follow up information about today's clinic visit or your schedule please contact [facility] directly at [phone].  Normal or non-critical lab and imaging results will be communicated to you by 46elkshart, letter or phone within 4 business days after the clinic has received the results. If you do not hear from us within 7 days, please contact the clinic through [employer] or phone. If you have a critical or abnormal lab result, we will notify you by phone as soon as possible.  Submit refill requests through [employer] or call your pharmacy and they will forward the refill request to us. Please allow 3 business days for your refill to be completed.          Additional Information About Your Visit        MyChart Information     [employer] gives you secure access to your electronic health record. If you see a primary care provider, you can also send messages to your care team and make appointments. If you have questions, please call your primary care clinic.  If you do not have a primary care provider, please call [phone] and they will assist you.        Care EveryWhere ID     This is your Care EveryWhere ID. This could be used by other organizations to access your [place] medical records  [id number]        Your Vitals Were     Pulse Temperature Respirations Height Pulse Oximetry Breastfeeding?    87 98.2  F (36.8  C) (Oral) 16 1.524 m (5') 98% No    BMI (Body  Mass Index)                   19.92 kg/m2            Blood Pressure from Last 3 Encounters:   03/28/18 134/86   03/14/18 113/72   02/14/18 137/70    Weight from Last 3 Encounters:   03/28/18 46.3 kg (102 lb)   03/14/18 49 kg (108 lb 1.6 oz)   02/14/18 46.1 kg (101 lb 11.2 oz)              Today, you had the following     No orders found for display         Today's Medication Changes          These changes are accurate as of 3/28/18  1:22 PM.  If you have any questions, ask your nurse or doctor.               These medicines have changed or have updated prescriptions.        Dose/Directions    loperamide 1 MG/5ML liquid   Commonly known as:  IMODIUM   This may have changed:  when to take this   Used for:  Bowel habit changes        Dose:  4 mg   Take 20 mLs (4 mg) by mouth 4 times daily as needed for diarrhea   Quantity:  200 mL   Refills:  0       traZODone 100 MG tablet   Commonly known as:  DESYREL   This may have changed:  how much to take   Used for:  Insomnia, unspecified type        Dose:  50 mg   0.5 tablets (50 mg) by Per G Tube route At Bedtime   Quantity:  30 tablet   Refills:  0                Primary Care Provider Office Phone # Fax #    Andrea Nino [phone]       [place] [address]        Equal Access to Services     DIANNA JORGENSEN AH: Hadii davion shannon Sothiago, waaxda luqadaha, qaybta kaalmada monica, florentin saldivar. So [facility] [phone].    ATENCIÓN: Si habla español, tiene a jackson disposición servicios gratuitos de asistencia lingüística. Llame al [phone].    We comply with applicable federal civil rights laws and [place] laws. We do not discriminate on the basis of race, color, national origin, age, disability, sex, sexual orientation, or gender identity.            Thank you!     Thank you for choosing [facility]  for your care. Our goal is always to provide you with excellent care.  Hearing back from our patients is one way we can continue to improve our services. Please take a few minutes to complete the written survey that you may receive in the mail after your visit with us. Thank you!             Your Updated Medication List - Protect others around you: Learn how to safely use, store and throw away your medicines at www.disposemymeds.org.          This list is accurate as of 3/28/18  1:22 PM.  Always use your most recent med list.                   Brand Name Dispense Instructions for use Diagnosis    acetaminophen 32 mg/mL solution    TYLENOL    300 mL    30.45 mLs (975 mg) by Per Feeding Tube route 3 times daily as needed    Esophageal perforation       BENADRYL PO      Take 25 mg by mouth nightly as needed        cholestyramine 4 G Packet    QUESTRAN     Take 1 packet by mouth daily        CULTURELLE PO      Take 1 tablet by mouth 2 times daily        ferrous sulfate 300 (60 FE) MG/5ML syrup     150 mL    5 mLs (300 mg) by Per J Tube route daily    Esophageal anastomotic leak       loperamide 1 MG/5ML liquid    IMODIUM    200 mL    Take 20 mLs (4 mg) by mouth 4 times daily as needed for diarrhea    Bowel habit changes       medical cannabis (Patient's own supply.  Not a prescription)      1 Units        MELATONIN PO      Take 5 mg by mouth At Bedtime        multivitamins with minerals Liqd liquid      Take 15 mLs by mouth daily        omeprazole 2 mg/mL Susp    priLOSEC    400 mL    Take 10 mLs (20 mg) by mouth 2 times daily    Gastroesophageal reflux disease without esophagitis       * order for DME     1 Device    Equipment being ordered:  [facility] Suction Machine-Intermittent Suction Canisters(2) Suction Canister Holders(2) Suction Connect Tube(2) 5 in 1 Connector(2) Bacteria Filter(2) Yaunkauer Suction(2)  Treatment Diagnosis: Esophogeal Perforation, s/p esophagectomy    Hx of esophagectomy       * order for DME     1 Device    Equipment being ordered:  Suction Pump Suction Canister(2)  Suction Tubing(2) Bacteria Filter(2) Yaunkauer(4) Red Rubber Catheter(2)  Treatment Diagnosis: Esophogeal Perforation, s/p esophagectomy    Esophageal perforation       oxyCODONE IR 5 MG tablet    ROXICODONE    84 tablet    Take 1 tablet (5 mg) by mouth See Admin Instructions One tablet every 4-6 hours as needed for pain    Esophageal anastomotic leak       traZODone 100 MG tablet    DESYREL    30 tablet    0.5 tablets (50 mg) by Per G Tube route At Bedtime    Insomnia, unspecified type       UNABLE TO FIND      2 nell supplement 4 cans daily per g tube        * Notice:  This list has 2 medication(s) that are the same as other medications prescribed for you. Read the directions carefully, and ask your doctor or other care provider to review them with you.

## 2018-03-28 NOTE — PROGRESS NOTES
THORACIC SURGERY FOLLOW UP VISIT    THORACIC & FOREGUT SURGERY    S: Pt had J-Tube removed 03/21/18. Since that time, she has had an excessive amount of drainage and she feels the J-Tube site is not healing.  She continues to endorse erythema and swelling around site that she believes is caused from all of the drainage.  She also complains to abdominal cramping and loose stools/diarrhea that have caused her to decrease her PO intake.       O:  Vitals:    03/28/18 1206   BP: 134/86   Pulse: 87   Resp: 16   Temp: 98.2  F (36.8  C)   TempSrc: Oral   SpO2: 98%   Weight: 46.3 kg (102 lb)   Height: 1.524 m (5')       J-tube site inspected. The surrounding tissue was erythematous and slightly tender to the touch.  The J tube insertion site did not seem to be healing over.    The J tube site was cleaned with betadine and 1 % Lidocaine was used as a local anaesthetic. 2.0 Vicryl sutures were used to close insertion without complication and a dressing was applied to the area.    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     On 03/21/18 her J tube was removed.  However insertion site not seeming to heal after one week.    -Two vicryl sutures were placed to the insertion site.   -A Dumping Syndrome Diet was provided to the patient.   -The patient will contact our office to schedule follow up.    Case discussed with Dr. Wise.     Bridgett Mittal, APRN, CNP  Thoracic and Forgut Surgery  [facility]

## 2018-03-30 ENCOUNTER — TELEPHONE (OUTPATIENT)
Dept: ONCOLOGY | Facility: CLINIC | Age: 73
End: 2018-03-30

## 2018-03-30 NOTE — TELEPHONE ENCOUNTER
[facility] Clinic Telephone Triage Note    Assessment: Patient called in to triage reporting the following symptoms: leaking around J tube. Last seen by Bridgett Mittal in clinic and site was secured with 2 sutures. Drainage is considerable less but still soaking through gauze dressings in 2-3 hours. No signs of purulent drainage or foul order. .    Recommendations: .  message to Bridgett and care team .    Follow-Up: Patient voiced understanding of advice and/or instructions given.

## 2018-04-03 ENCOUNTER — HOSPITAL ENCOUNTER (OUTPATIENT)
Dept: PALLIATIVE MEDICINE | Facility: OTHER | Age: 73
Discharge: HOME | End: 2018-04-03
Attending: NURSE PRACTITIONER

## 2018-04-03 DIAGNOSIS — G89.29 CHRONIC INTRACTABLE PAIN: ICD-10-CM

## 2018-04-03 DIAGNOSIS — G89.4 CHRONIC PAIN SYNDROME: ICD-10-CM

## 2018-04-03 ASSESSMENT — MIFFLIN-ST. JEOR: SCORE: 881.44

## 2018-04-04 ENCOUNTER — TELEPHONE (OUTPATIENT)
Dept: ONCOLOGY | Facility: CLINIC | Age: 73
End: 2018-04-04

## 2018-04-04 NOTE — TELEPHONE ENCOUNTER
Patient calling back again, continued leakage of serous fluid from site of J tube removal.     J tube removed on 3/21 in clinic.     Patient reports:     Changing heavy absorbing pads Q 3 hrs  No foul odor, bleeding, or purulent drainage  Area is very tender to touch  Afebrile    Would like to be seen.  [phone]    Paged Bridgett Mittal, Silvia Armstrong, Sarina Serna    Spoke with pt. She states the sutures did help some to slow down the drainage, but still draining and has pain around site.  D/W Dr. Barreto--this is not uncommon to have continued/slowing healing of the J tube insertion side. If the drainage is bothersome, she could use a small ostomy type bag for drainage.   Drainage should slowing decrease overtime.  No further intervention is recommmended at this time.  Pt should call back in 2 weeks if drainage continues.    Pt notified. She requested I inform Dr. Wise as well.  I sent him a message regarding this ongoing concern.    Bridgett Mittal, CNP  Thoracic Surgery.

## 2018-04-09 ENCOUNTER — COMMUNICATION - HEALTHEAST (OUTPATIENT)
Dept: PALLIATIVE MEDICINE | Facility: OTHER | Age: 73
End: 2018-04-09

## 2018-04-09 DIAGNOSIS — G89.4 CHRONIC PAIN SYNDROME: ICD-10-CM

## 2018-04-09 DIAGNOSIS — G89.29 CHRONIC INTRACTABLE PAIN: ICD-10-CM

## 2018-04-20 ENCOUNTER — RECORDS - HEALTHEAST (OUTPATIENT)
Dept: LAB | Facility: CLINIC | Age: 73
End: 2018-04-20

## 2018-04-20 ENCOUNTER — SURGERY - HEALTHEAST (OUTPATIENT)
Dept: CARDIOLOGY | Facility: CLINIC | Age: 73
End: 2018-04-20

## 2018-04-20 LAB
ALBUMIN SERPL-MCNC: 3 G/DL (ref 3.5–5)
ALP SERPL-CCNC: 158 U/L (ref 45–120)
ALT SERPL W P-5'-P-CCNC: 24 U/L (ref 0–45)
ANION GAP SERPL CALCULATED.3IONS-SCNC: 8 MMOL/L (ref 5–18)
AST SERPL W P-5'-P-CCNC: 34 U/L (ref 0–40)
BILIRUB SERPL-MCNC: 0.3 MG/DL (ref 0–1)
BUN SERPL-MCNC: 9 MG/DL (ref 8–28)
CALCIUM SERPL-MCNC: 8.5 MG/DL (ref 8.5–10.5)
CHLORIDE BLD-SCNC: 105 MMOL/L (ref 98–107)
CO2 SERPL-SCNC: 28 MMOL/L (ref 22–31)
CREAT SERPL-MCNC: 0.64 MG/DL (ref 0.6–1.1)
GFR SERPL CREATININE-BSD FRML MDRD: >60 ML/MIN/1.73M2
GLUCOSE BLD-MCNC: 87 MG/DL (ref 70–125)
POTASSIUM BLD-SCNC: 4.4 MMOL/L (ref 3.5–5)
PROT SERPL-MCNC: 6.7 G/DL (ref 6–8)
SODIUM SERPL-SCNC: 141 MMOL/L (ref 136–145)

## 2018-04-20 ASSESSMENT — MIFFLIN-ST. JEOR: SCORE: 883.7

## 2018-04-22 ASSESSMENT — MIFFLIN-ST. JEOR: SCORE: 875.09

## 2018-04-27 ENCOUNTER — COMMUNICATION - HEALTHEAST (OUTPATIENT)
Dept: CARDIOLOGY | Facility: CLINIC | Age: 73
End: 2018-04-27

## 2018-04-27 ENCOUNTER — RECORDS - HEALTHEAST (OUTPATIENT)
Dept: LAB | Facility: CLINIC | Age: 73
End: 2018-04-27

## 2018-04-27 LAB
CHOLEST SERPL-MCNC: 100 MG/DL
FASTING STATUS PATIENT QL REPORTED: NO
HDLC SERPL-MCNC: 36 MG/DL
LDLC SERPL CALC-MCNC: 47 MG/DL
TRIGL SERPL-MCNC: 83 MG/DL

## 2018-04-30 ENCOUNTER — HOSPITAL ENCOUNTER (OUTPATIENT)
Dept: PALLIATIVE MEDICINE | Facility: OTHER | Age: 73
Discharge: HOME | End: 2018-04-30
Attending: NURSE PRACTITIONER

## 2018-04-30 DIAGNOSIS — G89.4 CHRONIC PAIN SYNDROME: ICD-10-CM

## 2018-04-30 DIAGNOSIS — G89.29 CHRONIC INTRACTABLE PAIN: ICD-10-CM

## 2018-04-30 ASSESSMENT — MIFFLIN-ST. JEOR: SCORE: 874.63

## 2018-05-08 ENCOUNTER — RECORDS - HEALTHEAST (OUTPATIENT)
Dept: ADMINISTRATIVE | Facility: OTHER | Age: 73
End: 2018-05-08

## 2018-05-08 ENCOUNTER — AMBULATORY - HEALTHEAST (OUTPATIENT)
Dept: CARDIOLOGY | Facility: CLINIC | Age: 73
End: 2018-05-08

## 2018-05-08 ENCOUNTER — COMMUNICATION - HEALTHEAST (OUTPATIENT)
Dept: PHARMACY | Facility: CLINIC | Age: 73
End: 2018-05-08

## 2018-05-08 DIAGNOSIS — G89.29 CHRONIC PAIN: ICD-10-CM

## 2018-05-09 ENCOUNTER — AMBULATORY - HEALTHEAST (OUTPATIENT)
Dept: CARDIAC REHAB | Facility: CLINIC | Age: 73
End: 2018-05-09

## 2018-05-09 DIAGNOSIS — I21.19 STEMI INVOLVING OTH CORONARY ARTERY OF INFERIOR WALL (H): ICD-10-CM

## 2018-05-10 ENCOUNTER — OFFICE VISIT - HEALTHEAST (OUTPATIENT)
Dept: CARDIOLOGY | Facility: CLINIC | Age: 73
End: 2018-05-10

## 2018-05-10 DIAGNOSIS — I25.10 CORONARY ARTERY DISEASE: ICD-10-CM

## 2018-05-10 DIAGNOSIS — I21.3 ST ELEVATION MYOCARDIAL INFARCTION (STEMI), UNSPECIFIED ARTERY (H): ICD-10-CM

## 2018-05-10 DIAGNOSIS — I10 ESSENTIAL HYPERTENSION: ICD-10-CM

## 2018-05-10 DIAGNOSIS — I21.19 STEMI INVOLVING OTH CORONARY ARTERY OF INFERIOR WALL (H): ICD-10-CM

## 2018-05-10 DIAGNOSIS — E78.5 HYPERLIPIDEMIA, UNSPECIFIED HYPERLIPIDEMIA TYPE: ICD-10-CM

## 2018-05-10 ASSESSMENT — MIFFLIN-ST. JEOR: SCORE: 852.41

## 2018-05-15 ENCOUNTER — AMBULATORY - HEALTHEAST (OUTPATIENT)
Dept: CARDIAC REHAB | Facility: CLINIC | Age: 73
End: 2018-05-15

## 2018-05-15 DIAGNOSIS — I21.19 STEMI INVOLVING OTH CORONARY ARTERY OF INFERIOR WALL (H): ICD-10-CM

## 2018-05-17 ENCOUNTER — COMMUNICATION - HEALTHEAST (OUTPATIENT)
Dept: PALLIATIVE MEDICINE | Facility: OTHER | Age: 73
End: 2018-05-17

## 2018-05-17 ENCOUNTER — AMBULATORY - HEALTHEAST (OUTPATIENT)
Dept: CARDIAC REHAB | Facility: CLINIC | Age: 73
End: 2018-05-17

## 2018-05-17 DIAGNOSIS — I21.19 STEMI INVOLVING OTH CORONARY ARTERY OF INFERIOR WALL (H): ICD-10-CM

## 2018-05-17 DIAGNOSIS — Z95.5 S/P CORONARY ARTERY STENT PLACEMENT: ICD-10-CM

## 2018-05-21 ENCOUNTER — TELEPHONE (OUTPATIENT)
Dept: SURGERY | Facility: CLINIC | Age: 73
End: 2018-05-21

## 2018-05-21 NOTE — TELEPHONE ENCOUNTER
"Patient wants to see Dr. Wise in clinic this week to discuss some concerns. She states she is bleeding from her wound. She is having some dysphagia and losing weight. She also recently had a heart attack. She is coming into talk with Dr. Wise and to \"check in\" with him.  "

## 2018-05-22 ENCOUNTER — AMBULATORY - HEALTHEAST (OUTPATIENT)
Dept: CARDIAC REHAB | Facility: CLINIC | Age: 73
End: 2018-05-22

## 2018-05-22 ENCOUNTER — COMMUNICATION - HEALTHEAST (OUTPATIENT)
Dept: TELEHEALTH | Facility: CLINIC | Age: 73
End: 2018-05-22

## 2018-05-22 DIAGNOSIS — I21.19 STEMI INVOLVING OTH CORONARY ARTERY OF INFERIOR WALL (H): ICD-10-CM

## 2018-05-22 DIAGNOSIS — Z95.5 S/P CORONARY ARTERY STENT PLACEMENT: ICD-10-CM

## 2018-05-23 ENCOUNTER — OFFICE VISIT (OUTPATIENT)
Dept: SURGERY | Facility: CLINIC | Age: 73
End: 2018-05-23
Attending: THORACIC SURGERY (CARDIOTHORACIC VASCULAR SURGERY)
Payer: MEDICARE

## 2018-05-23 VITALS
DIASTOLIC BLOOD PRESSURE: 75 MMHG | HEART RATE: 66 BPM | WEIGHT: 95.9 LBS | RESPIRATION RATE: 16 BRPM | SYSTOLIC BLOOD PRESSURE: 139 MMHG | OXYGEN SATURATION: 97 % | TEMPERATURE: 98.2 F | HEIGHT: 60 IN | BODY MASS INDEX: 18.83 KG/M2

## 2018-05-23 DIAGNOSIS — Z98.890 H/O ESOPHAGECTOMY: Primary | ICD-10-CM

## 2018-05-23 DIAGNOSIS — Z90.49 H/O ESOPHAGECTOMY: Primary | ICD-10-CM

## 2018-05-23 PROCEDURE — G0463 HOSPITAL OUTPT CLINIC VISIT: HCPCS

## 2018-05-23 RX ORDER — METOPROLOL SUCCINATE 50 MG/1
25 TABLET, EXTENDED RELEASE ORAL DAILY
Status: ON HOLD | COMMUNITY
End: 2020-01-01

## 2018-05-23 RX ORDER — ATORVASTATIN CALCIUM 80 MG/1
80 TABLET, FILM COATED ORAL AT BEDTIME
Status: ON HOLD | COMMUNITY
Start: 2018-05-10 | End: 2020-01-01

## 2018-05-23 RX ORDER — ASPIRIN 81 MG/1
81 TABLET, CHEWABLE ORAL
COMMUNITY
Start: 2018-04-23 | End: 2019-05-08

## 2018-05-23 RX ORDER — SIMETHICONE 125 MG
125 CAPSULE ORAL
COMMUNITY
End: 2019-05-08

## 2018-05-23 ASSESSMENT — PAIN SCALES - GENERAL: PAINLEVEL: NO PAIN (0)

## 2018-05-23 NOTE — MR AVS SNAPSHOT
After Visit Summary   5/23/2018    Minerva Blanco    MRN: [id number]           Patient Information     Date Of Birth          1945        Visit Information        Provider Department      5/23/2018 5:30 PM Jens Wise MD [facility]        Today's Diagnoses     H/O esophagectomy    -  1       Follow-ups after your visit        Your next 10 appointments already scheduled     Jul 13, 2018  2:45 PM CDT   (Arrive by 2:30 PM)   Return Visit with CLIVE Nova CNP   [facility]    [address]  [address]  [facility] [place] [facility] [address] [phone]              Who to contact     If you have questions or need follow up information about today's clinic visit or your schedule please contact [facility] directly at [phone].  Normal or non-critical lab and imaging results will be communicated to you by MyChart, letter or phone within 4 business days after the clinic has received the results. If you do not hear from us within 7 days, please contact the clinic through [employer] or phone. If you have a critical or abnormal lab result, we will notify you by phone as soon as possible.  Submit refill requests through [employer] or call your pharmacy and they will forward the refill request to us. Please allow 3 business days for your refill to be completed.          Additional Information About Your Visit        MyChart Information     [employer] gives you secure access to your electronic health record. If you see a primary care provider, you can also send messages to your care team and make appointments. If you have questions, please call your primary care clinic.  If you do not have a primary care provider, please call [phone] and they will assist you.        Care EveryWhere ID     This is your Care EveryWhere ID. This could be used by other organizations to access your [place]  medical records  [id number]        Your Vitals Were     Pulse Temperature Respirations Height Pulse Oximetry BMI (Body Mass Index)    66 98.2  F (36.8  C) (Oral) 16 1.524 m (5') 97% 18.73 kg/m2       Blood Pressure from Last 3 Encounters:   05/23/18 139/75   03/28/18 134/86   03/14/18 113/72    Weight from Last 3 Encounters:   05/23/18 43.5 kg (95 lb 14.4 oz)   03/28/18 46.3 kg (102 lb)   03/14/18 49 kg (108 lb 1.6 oz)              Today, you had the following     No orders found for display         Today's Medication Changes          These changes are accurate as of 5/23/18 11:59 PM.  If you have any questions, ask your nurse or doctor.               These medicines have changed or have updated prescriptions.        Dose/Directions    loperamide 1 MG/5ML liquid   Commonly known as:  IMODIUM   This may have changed:  when to take this   Used for:  Bowel habit changes        Dose:  4 mg   Take 20 mLs (4 mg) by mouth 4 times daily as needed for diarrhea   Quantity:  200 mL   Refills:  0       traZODone 100 MG tablet   Commonly known as:  DESYREL   This may have changed:  how much to take   Used for:  Insomnia, unspecified type        Dose:  50 mg   0.5 tablets (50 mg) by Per G Tube route At Bedtime   Quantity:  30 tablet   Refills:  0                Primary Care Provider Office Phone # Fax #    Andrea Nino [phone]       [facility] [address] [place] [address]        Equal Access to Services     [facility] AH: Hadii davion morgano Sothiago, waaxda luqadaha, qaybta kaalmada adeegyada, waxay jacoby saldivar. So [facility] [phone].    ATENCIÓN: Si habla español, tiene a jackson disposición servicios gratuitos de asistencia lingüística. Llame al [phone].    We comply with applicable federal civil rights laws and [place] laws. We do not discriminate on the basis of race, color, national origin, age, disability, sex, sexual orientation, or gender  identity.            Thank you!     Thank you for choosing [facility] CANCER [facility]  for your care. Our goal is always to provide you with excellent care. Hearing back from our patients is one way we can continue to improve our services. Please take a few minutes to complete the written survey that you may receive in the mail after your visit with us. Thank you!             Your Updated Medication List - Protect others around you: Learn how to safely use, store and throw away your medicines at www.disposemymeds.org.          This list is accurate as of 5/23/18 11:59 PM.  Always use your most recent med list.                   Brand Name Dispense Instructions for use Diagnosis    acetaminophen 32 mg/mL solution    TYLENOL    300 mL    30.45 mLs (975 mg) by Per Feeding Tube route 3 times daily as needed    Esophageal perforation       aspirin 81 MG chewable tablet      Take 81 mg by mouth        atorvastatin 80 MG tablet    LIPITOR     Take 80 mg by mouth        BENADRYL PO      Take 25 mg by mouth nightly as needed        cholestyramine 4 g Packet    QUESTRAN     Take 1 packet by mouth daily        CULTURELLE PO      Take 1 tablet by mouth 2 times daily        ferrous sulfate 300 (60 Fe) MG/5ML syrup     150 mL    5 mLs (300 mg) by Per J Tube route daily    Esophageal anastomotic leak       loperamide 1 MG/5ML liquid    IMODIUM    200 mL    Take 20 mLs (4 mg) by mouth 4 times daily as needed for diarrhea    Bowel habit changes       medical cannabis (Patient's own supply.  Not a prescription)      1 Units        MELATONIN PO      Take 5 mg by mouth At Bedtime        metoprolol succinate 50 MG 24 hr tablet    TOPROL-XL     Take 50 mg by mouth        multivitamins with minerals Liqd liquid      Take 15 mLs by mouth daily        omeprazole 2 mg/mL Susp    priLOSEC    400 mL    Take 10 mLs (20 mg) by mouth 2 times daily    Gastroesophageal reflux disease without esophagitis       * order for DME     1 Device     Equipment being ordered:  [facility] Suction Machine-Intermittent Suction Canisters(2) Suction Canister Holders(2) Suction Connect Tube(2) 5 in 1 Connector(2) Bacteria Filter(2) Yaunkauer Suction(2)  Treatment Diagnosis: Esophogeal Perforation, s/p esophagectomy    Hx of esophagectomy       * order for DME     1 Device    Equipment being ordered:  Suction Pump Suction Canister(2) Suction Tubing(2) Bacteria Filter(2) Yaunkauer(4) Red Rubber Catheter(2)  Treatment Diagnosis: Esophogeal Perforation, s/p esophagectomy    Esophageal perforation       oxyCODONE IR 5 MG tablet    ROXICODONE    84 tablet    Take 1 tablet (5 mg) by mouth See Admin Instructions One tablet every 4-6 hours as needed for pain    Esophageal anastomotic leak       Simethicone 125 MG Caps      Take 125 mg by mouth        ticagrelor 90 MG tablet    BRILINTA     Take 90 mg by mouth        tiZANidine 4 MG tablet    ZANAFLEX     Take 1-2 po at HS prn        traZODone 100 MG tablet    DESYREL    30 tablet    0.5 tablets (50 mg) by Per G Tube route At Bedtime    Insomnia, unspecified type       UNABLE TO FIND      2 nell supplement 4 cans daily per g tube        * Notice:  This list has 2 medication(s) that are the same as other medications prescribed for you. Read the directions carefully, and ask your doctor or other care provider to review them with you.

## 2018-05-23 NOTE — LETTER
5/23/2018      RE: Minerva Blanco  [address]  [place] [address]       THORACIC SURGERY FOLLOW UP VISIT     Dear Dr. Carroll,  I saw Ms. Minerva Blanco in follow-up today. The clinical summary follows:      DIAGNOSIS   S/P esophagectomy for end-stage GERD     PROCEDURE   Outside institution (1/2016): laparoscopic hiatal hernia repair with Toupet fundoplication      [facility] (as of 4/2016):      PEG-GJ tube placement, multiple endoscopies and pharyngostomy tube placement (for drainage of mediastinal phlegmon/cavity)      Esophageal stent placement, attempted placement of biliary stent into the paraesophageal cavity (7/22/2016)      Esophageal stent removal, placement of retrograde gastroesophageal tube (10/23/2016)      1/9/2017  1.  Laparoscopic proximal gastrectomy and gastrostomy tube placement.    2.  Left thoracotomy with excision of mediastinal phlegmon and distal esophagectomy.    3.  Thoracic duct ligation.     4.  Left esophageal spit fistula.   4/17/2017  Laparoscopic retrosternal gastric pull-up, J-tube   6/9/2017  1. Esophagogastroscopy  2. Esophageal stent placement      7/17/2017  1. Esophagogastroduodenoscopy with stent exchange  2. Pharyngostomy tube revision      Multiple EGD, stent revisions and cauterization of esophagocutaneous fistula (most recent: 11/17/2017)      02/12/18 Esophageal stent removal  03/21/2018 J-tube removal      COMPLICATIONS  Thoracotomy wound infection requiring antibiotics  Chronic esophagocutaneous fistula - resolved on Lazo's day 2018     INTERVAL STUDIES  None     ETOH negative  TOB never smoker  BMI 19.8     SUBJECTIVE  Ms. Blanco returns to clinic today to talk about GERD symptoms and symptoms consistent with dumping. Of note, she had a recent MI and required stent placement.       From a personal perspective, she is here with her  Enrique AMOR (Z90.49) H/O esophagectomy  (primary encounter diagnosis)     73 year-old female  s/p esophagectomy for end-stage GERD  Resolved esophago-cutaneous fistula     PLAN  I spent a total of 15 minutes with Ms. Minerva Blanco and her , Enrique, more than 50% of which were spent in counseling, coordination of care, and face-to-face time. I reviewed the plan as follows:  We spent a good amount of time talking about how to best manage her GI symptoms with diet and medications. Bridgett Mittal NP, will follow-up with her by phone next week.  Return to clinic prn  All questions were answered and the patient and present family were in agreement with the plan.  I appreciate the opportunity to participate in the care of your patient and will keep you updated.  Sincerely,      Jens Wise MD

## 2018-05-23 NOTE — PROGRESS NOTES
THORACIC SURGERY FOLLOW UP VISIT     Dear Dr. Carroll,  I saw Ms. Minerva Blanco in follow-up today. The clinical summary follows:      DIAGNOSIS   S/P esophagectomy for end-stage GERD     PROCEDURE   Outside institution (1/2016): laparoscopic hiatal hernia repair with Toupet fundoplication      [facility] (as of 4/2016):      PEG-GJ tube placement, multiple endoscopies and pharyngostomy tube placement (for drainage of mediastinal phlegmon/cavity)      Esophageal stent placement, attempted placement of biliary stent into the paraesophageal cavity (7/22/2016)      Esophageal stent removal, placement of retrograde gastroesophageal tube (10/23/2016)      1/9/2017  1.  Laparoscopic proximal gastrectomy and gastrostomy tube placement.    2.  Left thoracotomy with excision of mediastinal phlegmon and distal esophagectomy.    3.  Thoracic duct ligation.     4.  Left esophageal spit fistula.   4/17/2017  Laparoscopic retrosternal gastric pull-up, J-tube   6/9/2017  1. Esophagogastroscopy  2. Esophageal stent placement      7/17/2017  1. Esophagogastroduodenoscopy with stent exchange  2. Pharyngostomy tube revision      Multiple EGD, stent revisions and cauterization of esophagocutaneous fistula (most recent: 11/17/2017)      02/12/18 Esophageal stent removal  03/21/2018 J-tube removal      COMPLICATIONS  Thoracotomy wound infection requiring antibiotics  Chronic esophagocutaneous fistula - resolved on Lazo's day 2018     INTERVAL STUDIES  None     ETOH negative  TOB never smoker  BMI 19.8     SUBJECTIVE  Ms. Blanco returns to clinic today to talk about GERD symptoms and symptoms consistent with dumping. Of note, she had a recent MI and required stent placement.       From a personal perspective, she is here with her  Enrique AMOR (Z90.49) H/O esophagectomy  (primary encounter diagnosis)     73 year-old female s/p esophagectomy for end-stage GERD  Resolved esophago-cutaneous fistula     PLAN  I spent a total  of 15 minutes with Ms. Minerva Blanco and her , Enrique, more than 50% of which were spent in counseling, coordination of care, and face-to-face time. I reviewed the plan as follows:  We spent a good amount of time talking about how to best manage her GI symptoms with diet and medications. Bridgett Mittal, HAYDER, will follow-up with her by phone next week.  Return to clinic prn  All questions were answered and the patient and present family were in agreement with the plan.  I appreciate the opportunity to participate in the care of your patient and will keep you updated.  Sincerely,

## 2018-05-23 NOTE — NURSING NOTE
Oncology Rooming Note    May 23, 2018 5:18 PM   Minerva Blanco is a 73 year old female who presents for:    Chief Complaint   Patient presents with     Oncology Clinic Visit     Return: Esophageal anastomotic leak     Initial Vitals: /75 (BP Location: Left arm, Patient Position: Sitting, Cuff Size: Adult Small)  Pulse 66  Temp 98.2  F (36.8  C) (Oral)  Resp 16  Ht 1.524 m (5')  Wt 43.5 kg (95 lb 14.4 oz)  SpO2 97%  BMI 18.73 kg/m2 Estimated body mass index is 18.73 kg/(m^2) as calculated from the following:    Height as of this encounter: 1.524 m (5').    Weight as of this encounter: 43.5 kg (95 lb 14.4 oz). Body surface area is 1.36 meters squared.  No Pain (0) Comment: Data Unavailable   No LMP recorded. Patient is postmenopausal.  Allergies reviewed: Yes  Medications reviewed: Yes    Medications: Medication refills not needed today.  Pharmacy name entered into [facility]: [id number] [address] [place], [place] - [facility] [address]    Clinical concerns: N/A     5 minutes for nursing intake (face to face time)     Danita Drew CMA

## 2018-05-24 ENCOUNTER — AMBULATORY - HEALTHEAST (OUTPATIENT)
Dept: CARDIAC REHAB | Facility: CLINIC | Age: 73
End: 2018-05-24

## 2018-05-24 DIAGNOSIS — I21.19 STEMI INVOLVING OTH CORONARY ARTERY OF INFERIOR WALL (H): ICD-10-CM

## 2018-05-30 ENCOUNTER — HOSPITAL ENCOUNTER (OUTPATIENT)
Dept: PALLIATIVE MEDICINE | Facility: OTHER | Age: 73
Discharge: HOME | End: 2018-05-30
Attending: NURSE PRACTITIONER

## 2018-05-30 DIAGNOSIS — G89.4 CHRONIC PAIN SYNDROME: ICD-10-CM

## 2018-05-30 DIAGNOSIS — G89.29 CHRONIC INTRACTABLE PAIN: ICD-10-CM

## 2018-05-30 ASSESSMENT — MIFFLIN-ST. JEOR: SCORE: 851.95

## 2018-05-31 ENCOUNTER — AMBULATORY - HEALTHEAST (OUTPATIENT)
Dept: CARDIAC REHAB | Facility: CLINIC | Age: 73
End: 2018-05-31

## 2018-05-31 DIAGNOSIS — Z95.5 S/P CORONARY ARTERY STENT PLACEMENT: ICD-10-CM

## 2018-05-31 DIAGNOSIS — I21.19 STEMI INVOLVING OTH CORONARY ARTERY OF INFERIOR WALL (H): ICD-10-CM

## 2018-06-05 ENCOUNTER — TELEPHONE (OUTPATIENT)
Dept: SURGERY | Facility: CLINIC | Age: 73
End: 2018-06-05

## 2018-06-05 ENCOUNTER — AMBULATORY - HEALTHEAST (OUTPATIENT)
Dept: CARDIAC REHAB | Facility: CLINIC | Age: 73
End: 2018-06-05

## 2018-06-05 DIAGNOSIS — I21.19 STEMI INVOLVING OTH CORONARY ARTERY OF INFERIOR WALL (H): ICD-10-CM

## 2018-06-05 NOTE — TELEPHONE ENCOUNTER
"I spoke with patient regarding continued difficulty with reflux.  She has struggled with reflux as well as dumping syndrome chronically.  We have discussed different strategies to try and better control his sxs.  Currently she was taking PPI (one tablet in am and 2 at HS) and a H2 blocker at HS (per her last visit with Dr. Wise).  She states she has been trying to keep a food journal to help to recognize any foods or patterns that worsen her sxs, however she states \"there is no rhyme or reason to her sxs\". She does not that she feels the H2 blocker is helpful in relieving her sxs, where as the PPI doesn't seem to do anything.  We will try having her take PPI one qam with H2 blocker one tablet 1/2 hour prior to lunch and again at HS (on a scheduled basis) to see if her sxs are better controlled.  She will call us back with an update.  Bridgett Mittal, CNP  Thoracic Surgery.     "

## 2018-06-06 ENCOUNTER — OFFICE VISIT - HEALTHEAST (OUTPATIENT)
Dept: CARDIOLOGY | Facility: CLINIC | Age: 73
End: 2018-06-06

## 2018-06-06 DIAGNOSIS — I34.0 NON-RHEUMATIC MITRAL REGURGITATION: ICD-10-CM

## 2018-06-06 DIAGNOSIS — I21.19 STEMI INVOLVING OTH CORONARY ARTERY OF INFERIOR WALL (H): ICD-10-CM

## 2018-06-06 ASSESSMENT — MIFFLIN-ST. JEOR: SCORE: 856.49

## 2018-06-07 ENCOUNTER — AMBULATORY - HEALTHEAST (OUTPATIENT)
Dept: CARDIAC REHAB | Facility: CLINIC | Age: 73
End: 2018-06-07

## 2018-06-07 DIAGNOSIS — Z95.5 S/P CORONARY ARTERY STENT PLACEMENT: ICD-10-CM

## 2018-06-07 DIAGNOSIS — I21.19 STEMI INVOLVING OTH CORONARY ARTERY OF INFERIOR WALL (H): ICD-10-CM

## 2018-06-12 ENCOUNTER — AMBULATORY - HEALTHEAST (OUTPATIENT)
Dept: CARDIAC REHAB | Facility: CLINIC | Age: 73
End: 2018-06-12

## 2018-06-12 DIAGNOSIS — I21.19 STEMI INVOLVING OTH CORONARY ARTERY OF INFERIOR WALL (H): ICD-10-CM

## 2018-06-12 DIAGNOSIS — Z95.5 S/P CORONARY ARTERY STENT PLACEMENT: ICD-10-CM

## 2018-06-14 ENCOUNTER — AMBULATORY - HEALTHEAST (OUTPATIENT)
Dept: CARDIAC REHAB | Facility: CLINIC | Age: 73
End: 2018-06-14

## 2018-06-14 DIAGNOSIS — I21.19 STEMI INVOLVING OTH CORONARY ARTERY OF INFERIOR WALL (H): ICD-10-CM

## 2018-06-14 DIAGNOSIS — Z95.5 S/P CORONARY ARTERY STENT PLACEMENT: ICD-10-CM

## 2018-06-15 ENCOUNTER — COMMUNICATION - HEALTHEAST (OUTPATIENT)
Dept: CARDIOLOGY | Facility: CLINIC | Age: 73
End: 2018-06-15

## 2018-06-15 ENCOUNTER — TELEPHONE (OUTPATIENT)
Dept: ONCOLOGY | Facility: CLINIC | Age: 73
End: 2018-06-15

## 2018-06-15 NOTE — TELEPHONE ENCOUNTER
[facility] home [facility] RN calling to discuss reflux symptoms. State pt is taking Zantac at lunch and bedtime, Omeprazole in AM and at bedtime and still having reflux symptoms. Asking if pt may increase doses or maybe change to another PPI. Please call back at [phone], [facility] is with pt now.  I called and left a message for Chel[facility]. I told her that she can work with Minerva on changing the time or trying a different PPI (they are OTC, so she is welcome to see if a different one works better for her).  I let Chelo know she can call me next week if needed.    Bridgett Mittal, CNP

## 2018-06-18 ENCOUNTER — COMMUNICATION - HEALTHEAST (OUTPATIENT)
Dept: PALLIATIVE MEDICINE | Facility: OTHER | Age: 73
End: 2018-06-18

## 2018-06-18 DIAGNOSIS — G89.4 CHRONIC PAIN SYNDROME: ICD-10-CM

## 2018-06-19 ENCOUNTER — AMBULATORY - HEALTHEAST (OUTPATIENT)
Dept: CARDIAC REHAB | Facility: CLINIC | Age: 73
End: 2018-06-19

## 2018-06-19 DIAGNOSIS — I21.19 STEMI INVOLVING OTH CORONARY ARTERY OF INFERIOR WALL (H): ICD-10-CM

## 2018-06-19 NOTE — PROGRESS NOTES
This is a recent snapshot of the patient's [facility] medical record.  For current drug dose and complete information and questions, call [phone] or In Basket pool, [facility] ([id number])  CSN Number:  [id number]

## 2018-07-16 ENCOUNTER — COMMUNICATION - HEALTHEAST (OUTPATIENT)
Dept: PALLIATIVE MEDICINE | Facility: OTHER | Age: 73
End: 2018-07-16

## 2018-07-16 DIAGNOSIS — G89.29 CHRONIC PAIN: ICD-10-CM

## 2018-07-17 ENCOUNTER — COMMUNICATION - HEALTHEAST (OUTPATIENT)
Dept: PALLIATIVE MEDICINE | Facility: OTHER | Age: 73
End: 2018-07-17

## 2018-07-25 ENCOUNTER — HOSPITAL ENCOUNTER (OUTPATIENT)
Dept: PALLIATIVE MEDICINE | Facility: OTHER | Age: 73
Discharge: HOME | End: 2018-07-25
Attending: NURSE PRACTITIONER

## 2018-07-25 DIAGNOSIS — G89.4 CHRONIC PAIN SYNDROME: ICD-10-CM

## 2018-07-25 DIAGNOSIS — G89.29 CHRONIC PAIN: ICD-10-CM

## 2018-07-25 DIAGNOSIS — G89.29 CHRONIC INTRACTABLE PAIN: ICD-10-CM

## 2018-07-25 DIAGNOSIS — M25.511 CHRONIC RIGHT SHOULDER PAIN: ICD-10-CM

## 2018-07-25 DIAGNOSIS — G89.29 CHRONIC RIGHT SHOULDER PAIN: ICD-10-CM

## 2018-07-25 ASSESSMENT — MIFFLIN-ST. JEOR: SCORE: 851.95

## 2018-08-27 ENCOUNTER — COMMUNICATION - HEALTHEAST (OUTPATIENT)
Dept: PALLIATIVE MEDICINE | Facility: OTHER | Age: 73
End: 2018-08-27

## 2018-08-27 DIAGNOSIS — M25.511 CHRONIC RIGHT SHOULDER PAIN: ICD-10-CM

## 2018-08-27 DIAGNOSIS — G89.29 CHRONIC INTRACTABLE PAIN: ICD-10-CM

## 2018-08-27 DIAGNOSIS — G89.29 CHRONIC RIGHT SHOULDER PAIN: ICD-10-CM

## 2018-08-27 DIAGNOSIS — G89.4 CHRONIC PAIN SYNDROME: ICD-10-CM

## 2018-09-21 ENCOUNTER — COMMUNICATION - HEALTHEAST (OUTPATIENT)
Dept: PHARMACY | Facility: HOSPITAL | Age: 73
End: 2018-09-21

## 2018-09-21 DIAGNOSIS — G89.29 CHRONIC INTRACTABLE PAIN: ICD-10-CM

## 2018-09-21 DIAGNOSIS — M25.511 CHRONIC RIGHT SHOULDER PAIN: ICD-10-CM

## 2018-09-21 DIAGNOSIS — G89.29 CHRONIC RIGHT SHOULDER PAIN: ICD-10-CM

## 2018-09-21 DIAGNOSIS — G89.4 CHRONIC PAIN SYNDROME: ICD-10-CM

## 2018-09-28 ENCOUNTER — HOSPITAL ENCOUNTER (OUTPATIENT)
Dept: PALLIATIVE MEDICINE | Facility: OTHER | Age: 73
Discharge: HOME | End: 2018-09-28
Attending: NURSE PRACTITIONER

## 2018-09-28 ENCOUNTER — OFFICE VISIT - HEALTHEAST (OUTPATIENT)
Dept: CARDIOLOGY | Facility: CLINIC | Age: 73
End: 2018-09-28

## 2018-09-28 DIAGNOSIS — G89.4 CHRONIC PAIN SYNDROME: ICD-10-CM

## 2018-09-28 DIAGNOSIS — M25.511 CHRONIC RIGHT SHOULDER PAIN: ICD-10-CM

## 2018-09-28 DIAGNOSIS — G89.29 CHRONIC RIGHT SHOULDER PAIN: ICD-10-CM

## 2018-09-28 DIAGNOSIS — I25.10 CORONARY ARTERY DISEASE INVOLVING NATIVE CORONARY ARTERY OF NATIVE HEART WITHOUT ANGINA PECTORIS: ICD-10-CM

## 2018-09-28 DIAGNOSIS — G89.29 CHRONIC INTRACTABLE PAIN: ICD-10-CM

## 2018-09-28 ASSESSMENT — MIFFLIN-ST. JEOR: SCORE: 829.27

## 2018-10-17 ENCOUNTER — COMMUNICATION - HEALTHEAST (OUTPATIENT)
Dept: PALLIATIVE MEDICINE | Facility: OTHER | Age: 73
End: 2018-10-17

## 2018-10-17 DIAGNOSIS — M25.511 CHRONIC RIGHT SHOULDER PAIN: ICD-10-CM

## 2018-10-17 DIAGNOSIS — G89.4 CHRONIC PAIN SYNDROME: ICD-10-CM

## 2018-10-17 DIAGNOSIS — G89.29 CHRONIC RIGHT SHOULDER PAIN: ICD-10-CM

## 2018-10-17 DIAGNOSIS — G89.29 CHRONIC INTRACTABLE PAIN: ICD-10-CM

## 2018-10-19 ENCOUNTER — AMBULATORY - HEALTHEAST (OUTPATIENT)
Dept: LAB | Facility: HOSPITAL | Age: 73
End: 2018-10-19

## 2018-10-19 ENCOUNTER — HOSPITAL ENCOUNTER (OUTPATIENT)
Dept: CARDIOLOGY | Facility: HOSPITAL | Age: 73
Discharge: HOME | End: 2018-10-19
Attending: INTERNAL MEDICINE

## 2018-10-19 LAB
AORTIC ROOT: 2.6 CM
AORTIC VALVE MEAN VELOCITY: 106 CM/S
AR DECEL SLOPE: 3220 MM/S2
AR PEAK VELOCITY: 424 CM/S
ASCENDING AORTA: 3.1 CM
AV DIMENSIONLESS INDEX VTI: 0.7
AV MEAN GRADIENT: 5 MMHG
AV PEAK GRADIENT: 9.2 MMHG
AV REGURGITANT PEAK GRADIENT: 71.9 MMHG
AV REGURGITATION PRESSURE HALF TIME: 386 MS
AV VALVE AREA: 1.5 CM2
BSA FOR ECHO PROCEDURE: 1.32 M2
CV BLOOD PRESSURE: NORMAL MMHG
CV ECHO HEIGHT: 60 IN
CV ECHO WEIGHT: 91 LBS
DOP CALC AO PEAK VEL: 152 CM/S
DOP CALC AO VTI: 34.9 CM
DOP CALC LVOT AREA: 2.01 CM2
DOP CALC LVOT DIAMETER: 1.6 CM
DOP CALC LVOT STROKE VOLUME: 52.5 CM3
DOP CALCLVOT PEAK VEL VTI: 26.1 CM
EJECTION FRACTION: 76 % (ref 55–75)
FRACTIONAL SHORTENING: 27.3 % (ref 28–44)
INTERVENTRICULAR SEPTUM IN END DIASTOLE: 1 CM (ref 0.6–0.9)
IVS/PW RATIO: 1.3
LA AREA 1: 18 CM2
LA AREA 2: 19.8 CM2
LEFT ATRIUM LENGTH: 4.88 CM
LEFT ATRIUM SIZE: 3.7 CM
LEFT ATRIUM VOLUME INDEX: 47 ML/M2
LEFT ATRIUM VOLUME: 62.1 ML
LEFT VENTRICLE CARDIAC INDEX: 2.8 L/MIN/M2
LEFT VENTRICLE CARDIAC OUTPUT: 3.7 L/MIN
LEFT VENTRICLE DIASTOLIC VOLUME INDEX: 28.8 CM3/M2 (ref 34–74)
LEFT VENTRICLE DIASTOLIC VOLUME: 38 CM3 (ref 46–106)
LEFT VENTRICLE HEART RATE: 70 BPM
LEFT VENTRICLE MASS INDEX: 61.4 G/M2
LEFT VENTRICLE SYSTOLIC VOLUME INDEX: 6.8 CM3/M2 (ref 11–31)
LEFT VENTRICLE SYSTOLIC VOLUME: 9 CM3 (ref 14–42)
LEFT VENTRICULAR INTERNAL DIMENSION IN DIASTOLE: 3.3 CM (ref 3.8–5.2)
LEFT VENTRICULAR INTERNAL DIMENSION IN SYSTOLE: 2.4 CM (ref 2.2–3.5)
LEFT VENTRICULAR MASS: 81.1 G
LEFT VENTRICULAR OUTFLOW TRACT MEAN GRADIENT: 3 MMHG
LEFT VENTRICULAR OUTFLOW TRACT MEAN VELOCITY: 77.6 CM/S
LEFT VENTRICULAR POSTERIOR WALL IN END DIASTOLE: 0.8 CM (ref 0.6–0.9)
LV STROKE VOLUME INDEX: 39.7 ML/M2
MITRAL VALVE E/A RATIO: 1.2
MV AVERAGE E/E' RATIO: 12.9 CM/S
MV DECELERATION TIME: 197 MS
MV E'TISSUE VEL-LAT: 7.8 CM/S
MV E'TISSUE VEL-MED: 7.9 CM/S
MV LATERAL E/E' RATIO: 12.9
MV MEDIAL E/E' RATIO: 12.8
MV PEAK A VELOCITY: 81.4 CM/S
MV PEAK E VELOCITY: 101 CM/S
NUC REST DIASTOLIC VOLUME INDEX: 1456 LBS
NUC REST SYSTOLIC VOLUME INDEX: 60 IN
PR MAX PG: 4 MMHG
PR PEAK VELOCITY: 96.5 CM/S
TRICUSPID REGURGITATION PEAK PRESSURE GRADIENT: 32.5 MMHG
TRICUSPID VALVE ANULAR PLANE SYSTOLIC EXCURSION: 1.9 CM
TRICUSPID VALVE PEAK REGURGITANT VELOCITY: 285 CM/S

## 2018-10-19 ASSESSMENT — MIFFLIN-ST. JEOR: SCORE: 829.27

## 2018-10-25 ENCOUNTER — COMMUNICATION - HEALTHEAST (OUTPATIENT)
Dept: PALLIATIVE MEDICINE | Facility: OTHER | Age: 73
End: 2018-10-25

## 2018-10-25 DIAGNOSIS — G89.4 CHRONIC PAIN SYNDROME: ICD-10-CM

## 2018-10-25 DIAGNOSIS — G89.29 CHRONIC RIGHT SHOULDER PAIN: ICD-10-CM

## 2018-10-25 DIAGNOSIS — G89.29 CHRONIC INTRACTABLE PAIN: ICD-10-CM

## 2018-10-25 DIAGNOSIS — M25.511 CHRONIC RIGHT SHOULDER PAIN: ICD-10-CM

## 2018-11-20 ENCOUNTER — COMMUNICATION - HEALTHEAST (OUTPATIENT)
Dept: PALLIATIVE MEDICINE | Facility: OTHER | Age: 73
End: 2018-11-20

## 2018-11-20 DIAGNOSIS — M25.511 CHRONIC RIGHT SHOULDER PAIN: ICD-10-CM

## 2018-11-20 DIAGNOSIS — G89.4 CHRONIC PAIN SYNDROME: ICD-10-CM

## 2018-11-20 DIAGNOSIS — G89.29 CHRONIC INTRACTABLE PAIN: ICD-10-CM

## 2018-11-20 DIAGNOSIS — G89.29 CHRONIC RIGHT SHOULDER PAIN: ICD-10-CM

## 2018-11-23 ENCOUNTER — COMMUNICATION - HEALTHEAST (OUTPATIENT)
Dept: PALLIATIVE MEDICINE | Facility: OTHER | Age: 73
End: 2018-11-23

## 2018-12-13 ENCOUNTER — COMMUNICATION - HEALTHEAST (OUTPATIENT)
Dept: PHARMACY | Facility: HOSPITAL | Age: 73
End: 2018-12-13

## 2018-12-19 ENCOUNTER — COMMUNICATION - HEALTHEAST (OUTPATIENT)
Dept: PHARMACY | Facility: HOSPITAL | Age: 73
End: 2018-12-19

## 2018-12-19 DIAGNOSIS — G89.29 CHRONIC RIGHT SHOULDER PAIN: ICD-10-CM

## 2018-12-19 DIAGNOSIS — M25.511 CHRONIC RIGHT SHOULDER PAIN: ICD-10-CM

## 2018-12-19 DIAGNOSIS — G89.29 CHRONIC INTRACTABLE PAIN: ICD-10-CM

## 2018-12-19 DIAGNOSIS — K21.9 GASTROESOPHAGEAL REFLUX DISEASE WITHOUT ESOPHAGITIS: Primary | ICD-10-CM

## 2018-12-19 DIAGNOSIS — G89.4 CHRONIC PAIN SYNDROME: ICD-10-CM

## 2018-12-19 RX ORDER — NICOTINE POLACRILEX 4 MG/1
20 GUM, CHEWING ORAL 2 TIMES DAILY
Qty: 180 TABLET | Refills: 3 | Status: SHIPPED | OUTPATIENT
Start: 2018-12-19 | End: 2019-01-01

## 2018-12-22 ENCOUNTER — COMMUNICATION - HEALTHEAST (OUTPATIENT)
Dept: PALLIATIVE MEDICINE | Facility: OTHER | Age: 73
End: 2018-12-22

## 2018-12-22 DIAGNOSIS — G89.4 CHRONIC PAIN SYNDROME: ICD-10-CM

## 2018-12-26 ENCOUNTER — COMMUNICATION - HEALTHEAST (OUTPATIENT)
Dept: PALLIATIVE MEDICINE | Facility: CLINIC | Age: 73
End: 2018-12-26

## 2018-12-26 DIAGNOSIS — G89.29 CHRONIC INTRACTABLE PAIN: ICD-10-CM

## 2018-12-26 DIAGNOSIS — G89.4 CHRONIC PAIN SYNDROME: ICD-10-CM

## 2018-12-26 DIAGNOSIS — M25.511 CHRONIC RIGHT SHOULDER PAIN: ICD-10-CM

## 2018-12-26 DIAGNOSIS — G89.29 CHRONIC RIGHT SHOULDER PAIN: ICD-10-CM

## 2019-01-02 ENCOUNTER — COMMUNICATION - HEALTHEAST (OUTPATIENT)
Dept: PALLIATIVE MEDICINE | Facility: OTHER | Age: 74
End: 2019-01-02

## 2019-01-02 ENCOUNTER — AMBULATORY - HEALTHEAST (OUTPATIENT)
Dept: PALLIATIVE MEDICINE | Facility: OTHER | Age: 74
End: 2019-01-02

## 2019-01-02 DIAGNOSIS — G89.4 CHRONIC PAIN SYNDROME: ICD-10-CM

## 2019-01-02 DIAGNOSIS — M25.511 CHRONIC RIGHT SHOULDER PAIN: ICD-10-CM

## 2019-01-02 DIAGNOSIS — G89.29 CHRONIC INTRACTABLE PAIN: ICD-10-CM

## 2019-01-02 DIAGNOSIS — G89.29 CHRONIC RIGHT SHOULDER PAIN: ICD-10-CM

## 2019-01-10 ENCOUNTER — COMMUNICATION - HEALTHEAST (OUTPATIENT)
Dept: CARDIOLOGY | Facility: CLINIC | Age: 74
End: 2019-01-10

## 2019-01-10 DIAGNOSIS — I21.19 STEMI INVOLVING OTH CORONARY ARTERY OF INFERIOR WALL (H): ICD-10-CM

## 2019-01-24 ENCOUNTER — COMMUNICATION - HEALTHEAST (OUTPATIENT)
Dept: PALLIATIVE MEDICINE | Facility: OTHER | Age: 74
End: 2019-01-24

## 2019-01-24 DIAGNOSIS — G89.4 CHRONIC PAIN SYNDROME: ICD-10-CM

## 2019-01-24 DIAGNOSIS — G89.29 CHRONIC INTRACTABLE PAIN: ICD-10-CM

## 2019-01-24 DIAGNOSIS — M25.511 CHRONIC RIGHT SHOULDER PAIN: ICD-10-CM

## 2019-01-24 DIAGNOSIS — G89.29 CHRONIC RIGHT SHOULDER PAIN: ICD-10-CM

## 2019-01-25 ENCOUNTER — COMMUNICATION - HEALTHEAST (OUTPATIENT)
Dept: PHARMACY | Facility: HOSPITAL | Age: 74
End: 2019-01-25

## 2019-01-29 ENCOUNTER — COMMUNICATION - HEALTHEAST (OUTPATIENT)
Dept: PALLIATIVE MEDICINE | Facility: OTHER | Age: 74
End: 2019-01-29

## 2019-01-29 DIAGNOSIS — G89.29 CHRONIC RIGHT SHOULDER PAIN: ICD-10-CM

## 2019-01-29 DIAGNOSIS — G89.4 CHRONIC PAIN SYNDROME: ICD-10-CM

## 2019-01-29 DIAGNOSIS — M25.511 CHRONIC RIGHT SHOULDER PAIN: ICD-10-CM

## 2019-01-29 DIAGNOSIS — G89.29 CHRONIC INTRACTABLE PAIN: ICD-10-CM

## 2019-02-12 ENCOUNTER — COMMUNICATION - HEALTHEAST (OUTPATIENT)
Dept: PALLIATIVE MEDICINE | Facility: OTHER | Age: 74
End: 2019-02-12

## 2019-02-13 ENCOUNTER — RECORDS - HEALTHEAST (OUTPATIENT)
Dept: ADMINISTRATIVE | Facility: OTHER | Age: 74
End: 2019-02-13

## 2019-02-13 ENCOUNTER — COMMUNICATION - HEALTHEAST (OUTPATIENT)
Dept: CARDIOLOGY | Facility: CLINIC | Age: 74
End: 2019-02-13

## 2019-02-20 ENCOUNTER — COMMUNICATION - HEALTHEAST (OUTPATIENT)
Dept: PALLIATIVE MEDICINE | Facility: OTHER | Age: 74
End: 2019-02-20

## 2019-02-20 ENCOUNTER — COMMUNICATION - HEALTHEAST (OUTPATIENT)
Dept: CARDIOLOGY | Facility: CLINIC | Age: 74
End: 2019-02-20

## 2019-02-20 DIAGNOSIS — G89.29 CHRONIC INTRACTABLE PAIN: ICD-10-CM

## 2019-02-20 DIAGNOSIS — G89.4 CHRONIC PAIN SYNDROME: ICD-10-CM

## 2019-02-20 DIAGNOSIS — M25.511 CHRONIC RIGHT SHOULDER PAIN: ICD-10-CM

## 2019-02-20 DIAGNOSIS — G89.29 CHRONIC RIGHT SHOULDER PAIN: ICD-10-CM

## 2019-02-21 ENCOUNTER — COMMUNICATION - HEALTHEAST (OUTPATIENT)
Dept: PALLIATIVE MEDICINE | Facility: OTHER | Age: 74
End: 2019-02-21

## 2019-02-21 ENCOUNTER — AMBULATORY - HEALTHEAST (OUTPATIENT)
Dept: CARE COORDINATION | Facility: CLINIC | Age: 74
End: 2019-02-21

## 2019-02-21 DIAGNOSIS — G89.29 CHRONIC RIGHT SHOULDER PAIN: ICD-10-CM

## 2019-02-21 DIAGNOSIS — G89.4 CHRONIC PAIN SYNDROME: ICD-10-CM

## 2019-02-21 DIAGNOSIS — G89.29 CHRONIC INTRACTABLE PAIN: ICD-10-CM

## 2019-02-21 DIAGNOSIS — M25.511 CHRONIC RIGHT SHOULDER PAIN: ICD-10-CM

## 2019-03-12 ENCOUNTER — COMMUNICATION - HEALTHEAST (OUTPATIENT)
Dept: PALLIATIVE MEDICINE | Facility: OTHER | Age: 74
End: 2019-03-12

## 2019-03-12 DIAGNOSIS — G89.4 CHRONIC PAIN SYNDROME: ICD-10-CM

## 2019-03-21 ENCOUNTER — RECORDS - HEALTHEAST (OUTPATIENT)
Dept: LAB | Facility: CLINIC | Age: 74
End: 2019-03-21

## 2019-03-21 LAB
ALBUMIN SERPL-MCNC: 3.1 G/DL (ref 3.5–5)
ALP SERPL-CCNC: 252 U/L (ref 45–120)
ALT SERPL W P-5'-P-CCNC: 24 U/L (ref 0–45)
ANION GAP SERPL CALCULATED.3IONS-SCNC: 12 MMOL/L (ref 5–18)
AST SERPL W P-5'-P-CCNC: 65 U/L (ref 0–40)
BILIRUB SERPL-MCNC: 0.4 MG/DL (ref 0–1)
BUN SERPL-MCNC: 9 MG/DL (ref 8–28)
CALCIUM SERPL-MCNC: 8.7 MG/DL (ref 8.5–10.5)
CHLORIDE BLD-SCNC: 104 MMOL/L (ref 98–107)
CHOLEST SERPL-MCNC: 189 MG/DL
CO2 SERPL-SCNC: 22 MMOL/L (ref 22–31)
CREAT SERPL-MCNC: 0.73 MG/DL (ref 0.6–1.1)
FASTING STATUS PATIENT QL REPORTED: NORMAL
GFR SERPL CREATININE-BSD FRML MDRD: >60 ML/MIN/1.73M2
GLUCOSE BLD-MCNC: 63 MG/DL (ref 70–125)
HDLC SERPL-MCNC: 61 MG/DL
LDLC SERPL CALC-MCNC: 108 MG/DL
POTASSIUM BLD-SCNC: 4.5 MMOL/L (ref 3.5–5)
PROT SERPL-MCNC: 6.6 G/DL (ref 6–8)
SODIUM SERPL-SCNC: 138 MMOL/L (ref 136–145)
TRIGL SERPL-MCNC: 101 MG/DL

## 2019-03-22 ENCOUNTER — RECORDS - HEALTHEAST (OUTPATIENT)
Dept: LAB | Facility: CLINIC | Age: 74
End: 2019-03-22

## 2019-03-22 LAB — VIT B12 SERPL-MCNC: 750 PG/ML (ref 213–816)

## 2019-03-30 ENCOUNTER — HOME CARE/HOSPICE - HEALTHEAST (OUTPATIENT)
Dept: HOME HEALTH SERVICES | Facility: HOME HEALTH | Age: 74
End: 2019-03-30

## 2019-04-01 ENCOUNTER — HOME CARE/HOSPICE - HEALTHEAST (OUTPATIENT)
Dept: HOME HEALTH SERVICES | Facility: HOME HEALTH | Age: 74
End: 2019-04-01

## 2019-04-02 ENCOUNTER — RECORDS - HEALTHEAST (OUTPATIENT)
Dept: ADMINISTRATIVE | Facility: OTHER | Age: 74
End: 2019-04-02

## 2019-04-02 ENCOUNTER — HOME CARE/HOSPICE - HEALTHEAST (OUTPATIENT)
Dept: HOME HEALTH SERVICES | Facility: HOME HEALTH | Age: 74
End: 2019-04-02

## 2019-04-03 ENCOUNTER — HOME CARE/HOSPICE - HEALTHEAST (OUTPATIENT)
Dept: HOME HEALTH SERVICES | Facility: HOME HEALTH | Age: 74
End: 2019-04-03

## 2019-04-04 ENCOUNTER — HOME CARE/HOSPICE - HEALTHEAST (OUTPATIENT)
Dept: HOME HEALTH SERVICES | Facility: HOME HEALTH | Age: 74
End: 2019-04-04

## 2019-04-05 ENCOUNTER — HOME CARE/HOSPICE - HEALTHEAST (OUTPATIENT)
Dept: HOME HEALTH SERVICES | Facility: HOME HEALTH | Age: 74
End: 2019-04-05

## 2019-04-08 ENCOUNTER — HOME CARE/HOSPICE - HEALTHEAST (OUTPATIENT)
Dept: HOME HEALTH SERVICES | Facility: HOME HEALTH | Age: 74
End: 2019-04-08

## 2019-04-09 ENCOUNTER — HOSPITAL ENCOUNTER (OUTPATIENT)
Dept: PALLIATIVE MEDICINE | Facility: OTHER | Age: 74
Discharge: HOME | End: 2019-04-09
Attending: NURSE PRACTITIONER

## 2019-04-09 DIAGNOSIS — M62.838 MUSCLE SPASM: ICD-10-CM

## 2019-04-09 DIAGNOSIS — M21.371 FOOT DROP, RIGHT FOOT: ICD-10-CM

## 2019-04-09 DIAGNOSIS — G89.4 CHRONIC PAIN SYNDROME: ICD-10-CM

## 2019-04-09 ASSESSMENT — MIFFLIN-ST. JEOR: SCORE: 806.59

## 2019-04-10 ENCOUNTER — HOME CARE/HOSPICE - HEALTHEAST (OUTPATIENT)
Dept: HOME HEALTH SERVICES | Facility: HOME HEALTH | Age: 74
End: 2019-04-10

## 2019-04-12 ENCOUNTER — HOME CARE/HOSPICE - HEALTHEAST (OUTPATIENT)
Dept: HOME HEALTH SERVICES | Facility: HOME HEALTH | Age: 74
End: 2019-04-12

## 2019-04-15 ENCOUNTER — HOME CARE/HOSPICE - HEALTHEAST (OUTPATIENT)
Dept: HOME HEALTH SERVICES | Facility: HOME HEALTH | Age: 74
End: 2019-04-15

## 2019-04-16 ENCOUNTER — HOME CARE/HOSPICE - HEALTHEAST (OUTPATIENT)
Dept: HOME HEALTH SERVICES | Facility: HOME HEALTH | Age: 74
End: 2019-04-16

## 2019-04-16 ENCOUNTER — RECORDS - HEALTHEAST (OUTPATIENT)
Dept: ADMINISTRATIVE | Facility: OTHER | Age: 74
End: 2019-04-16

## 2019-04-16 LAB
6MAM UR QL: NOT DETECTED
7AMINOCLONAZEPAM UR QL: NOT DETECTED
A-OH ALPRAZ UR QL: NOT DETECTED
ALPRAZ UR QL: NOT DETECTED
AMPHET UR QL SCN: NOT DETECTED
BARBITURATES UR QL: NOT DETECTED
BUPRENORPHINE UR QL: NOT DETECTED
BZE UR QL: NOT DETECTED
CARBOXYTHC UR QL: NOT DETECTED
CARISOPRODOL UR QL: NOT DETECTED
CLONAZEPAM UR QL: NOT DETECTED
CODEINE UR QL: NOT DETECTED
CREAT UR-MCNC: 95.6 MG/DL (ref 20–400)
DIAZEPAM UR QL: NOT DETECTED
ETHYL GLUCURONIDE UR QL: NOT DETECTED
FENTANYL UR QL: NOT DETECTED
HYDROCODONE UR QL: NOT DETECTED
HYDROMORPHONE UR QL: PRESENT
LORAZEPAM UR QL: NOT DETECTED
MDA UR QL: NOT DETECTED
MDEA UR QL: NOT DETECTED
MDMA UR QL: NOT DETECTED
ME-PHENIDATE UR QL: NOT DETECTED
MEPERIDINE UR QL: NOT DETECTED
METHADONE UR QL: NOT DETECTED
METHAMPHET UR QL: NOT DETECTED
MIDAZOLAM UR QL SCN: NOT DETECTED
MORPHINE UR QL: NOT DETECTED
NORBUPRENORPHINE UR QL CFM: NOT DETECTED
NORDIAZEPAM UR QL: NOT DETECTED
NORFENTANYL UR QL: NOT DETECTED
NORHYDROCODONE UR QL CFM: NOT DETECTED
NOROXYCODONE UR QL CFM: NOT DETECTED
NOROXYMORPHONE UR QL SCN: NOT DETECTED
OXAZEPAM UR QL: NOT DETECTED
OXYCODONE UR QL: NOT DETECTED
OXYMORPHONE UR QL: NOT DETECTED
PATHOLOGY STUDY: NORMAL
PCP UR QL: NOT DETECTED
PHENTERMINE UR QL: NOT DETECTED
PROPOXYPH UR QL: NOT DETECTED
SERVICE CMNT-IMP: NORMAL
TAPENTADOL UR QL SCN: NOT DETECTED
TAPENTADOL UR QL SCN: NOT DETECTED
TEMAZEPAM UR QL: NOT DETECTED
TRAMADOL UR QL: NOT DETECTED
ZOLPIDEM UR QL: NOT DETECTED

## 2019-04-17 ENCOUNTER — HOSPITAL ENCOUNTER (OUTPATIENT)
Dept: MRI IMAGING | Facility: CLINIC | Age: 74
Discharge: HOME | End: 2019-04-17

## 2019-04-17 DIAGNOSIS — M21.371 FOOT DROP, RIGHT FOOT: ICD-10-CM

## 2019-04-18 ENCOUNTER — COMMUNICATION - HEALTHEAST (OUTPATIENT)
Dept: PALLIATIVE MEDICINE | Facility: OTHER | Age: 74
End: 2019-04-18

## 2019-04-19 ENCOUNTER — HOME CARE/HOSPICE - HEALTHEAST (OUTPATIENT)
Dept: HOME HEALTH SERVICES | Facility: HOME HEALTH | Age: 74
End: 2019-04-19

## 2019-04-22 ENCOUNTER — COMMUNICATION - HEALTHEAST (OUTPATIENT)
Dept: PALLIATIVE MEDICINE | Facility: OTHER | Age: 74
End: 2019-04-22

## 2019-04-25 ENCOUNTER — HOME CARE/HOSPICE - HEALTHEAST (OUTPATIENT)
Dept: HOME HEALTH SERVICES | Facility: HOME HEALTH | Age: 74
End: 2019-04-25

## 2019-04-26 ENCOUNTER — HOME CARE/HOSPICE - HEALTHEAST (OUTPATIENT)
Dept: HOME HEALTH SERVICES | Facility: HOME HEALTH | Age: 74
End: 2019-04-26

## 2019-05-02 ENCOUNTER — HOME CARE/HOSPICE - HEALTHEAST (OUTPATIENT)
Dept: HOME HEALTH SERVICES | Facility: HOME HEALTH | Age: 74
End: 2019-05-02

## 2019-05-08 ENCOUNTER — NURSING HOME VISIT (OUTPATIENT)
Dept: GERIATRICS | Facility: CLINIC | Age: 74
End: 2019-05-08

## 2019-05-08 VITALS
DIASTOLIC BLOOD PRESSURE: 64 MMHG | RESPIRATION RATE: 18 BRPM | HEART RATE: 78 BPM | WEIGHT: 112.8 LBS | OXYGEN SATURATION: 95 % | TEMPERATURE: 97.7 F | SYSTOLIC BLOOD PRESSURE: 120 MMHG | BODY MASS INDEX: 22.03 KG/M2

## 2019-05-08 DIAGNOSIS — R05.9 COUGH: ICD-10-CM

## 2019-05-08 DIAGNOSIS — D62 POSTOPERATIVE ANEMIA DUE TO ACUTE BLOOD LOSS: ICD-10-CM

## 2019-05-08 DIAGNOSIS — I74.2 BRACHIAL ARTERY THROMBOSIS (H): ICD-10-CM

## 2019-05-08 DIAGNOSIS — M48.062 SPINAL STENOSIS OF LUMBAR REGION WITH NEUROGENIC CLAUDICATION: ICD-10-CM

## 2019-05-08 DIAGNOSIS — Z90.49 HX OF ESOPHAGECTOMY: ICD-10-CM

## 2019-05-08 DIAGNOSIS — G47.00 INSOMNIA, UNSPECIFIED TYPE: ICD-10-CM

## 2019-05-08 DIAGNOSIS — Z98.890 S/P LUMBAR LAMINECTOMY: ICD-10-CM

## 2019-05-08 DIAGNOSIS — I25.10 CORONARY ARTERY DISEASE INVOLVING NATIVE CORONARY ARTERY OF NATIVE HEART WITHOUT ANGINA PECTORIS: ICD-10-CM

## 2019-05-08 DIAGNOSIS — I10 ESSENTIAL HYPERTENSION: ICD-10-CM

## 2019-05-08 DIAGNOSIS — E87.6 HYPOKALEMIA: ICD-10-CM

## 2019-05-08 DIAGNOSIS — Z98.890 HX OF ESOPHAGECTOMY: ICD-10-CM

## 2019-05-08 DIAGNOSIS — I89.0 LYMPHEDEMA: ICD-10-CM

## 2019-05-08 DIAGNOSIS — S32.010D CLOSED COMPRESSION FRACTURE OF L1 LUMBAR VERTEBRA WITH ROUTINE HEALING, SUBSEQUENT ENCOUNTER: Primary | ICD-10-CM

## 2019-05-08 PROBLEM — I48.91 ATRIAL FIBRILLATION (H): Status: RESOLVED | Noted: 2017-02-22 | Resolved: 2019-05-08

## 2019-05-08 PROBLEM — I48.0 PAROXYSMAL ATRIAL FIBRILLATION (H): Status: RESOLVED | Noted: 2017-02-21 | Resolved: 2019-05-08

## 2019-05-08 PROCEDURE — 99310 SBSQ NF CARE HIGH MDM 45: CPT | Performed by: NURSE PRACTITIONER

## 2019-05-08 RX ORDER — BENZONATATE 100 MG/1
100 CAPSULE ORAL 3 TIMES DAILY PRN
Status: ON HOLD | COMMUNITY
End: 2020-01-01

## 2019-05-08 RX ORDER — MULTIPLE VITAMINS W/ MINERALS TAB 9MG-400MCG
1 TAB ORAL DAILY
Status: ON HOLD | COMMUNITY
End: 2020-01-01

## 2019-05-08 RX ORDER — HYDROMORPHONE HYDROCHLORIDE 2 MG/1
2 TABLET ORAL 2 TIMES DAILY PRN
Status: ON HOLD | COMMUNITY
End: 2020-01-01

## 2019-05-08 RX ORDER — POLYETHYLENE GLYCOL 3350 17 G/17G
1 POWDER, FOR SOLUTION ORAL DAILY PRN
Status: ON HOLD | COMMUNITY
End: 2020-01-01

## 2019-05-08 RX ORDER — FERROUS SULFATE 325(65) MG
325 TABLET ORAL
Status: ON HOLD | COMMUNITY
End: 2020-01-01

## 2019-05-08 RX ORDER — LIDOCAINE 50 MG/G
PATCH TOPICAL
Status: ON HOLD | COMMUNITY
End: 2020-01-01

## 2019-05-08 RX ORDER — GABAPENTIN 400 MG/1
400 CAPSULE ORAL 2 TIMES DAILY
Status: ON HOLD | COMMUNITY
End: 2020-01-01

## 2019-05-08 RX ORDER — LOPERAMIDE HYDROCHLORIDE 2 MG/1
2 TABLET ORAL 4 TIMES DAILY PRN
COMMUNITY

## 2019-05-08 RX ORDER — FUROSEMIDE 20 MG
20 TABLET ORAL 2 TIMES DAILY
COMMUNITY
Start: 2019-05-08 | End: 2019-05-20

## 2019-05-08 RX ORDER — CALCIUM CARBONATE/VITAMIN D3 500-10/5ML
400 LIQUID (ML) ORAL 2 TIMES DAILY
Status: ON HOLD | COMMUNITY
End: 2020-01-01

## 2019-05-08 RX ORDER — DULOXETIN HYDROCHLORIDE 30 MG/1
30 CAPSULE, DELAYED RELEASE ORAL DAILY
Status: ON HOLD | COMMUNITY
End: 2020-01-01

## 2019-05-08 RX ORDER — POTASSIUM CHLORIDE 1500 MG/1
40 TABLET, EXTENDED RELEASE ORAL 2 TIMES DAILY
Status: ON HOLD | COMMUNITY
Start: 2019-05-08 | End: 2020-01-01

## 2019-05-08 NOTE — LETTER
5/8/2019        RE: Minerva Blanco  [address]  [place] [address]        [facility]  PRIMARY CARE PROVIDER AND CLINIC:  Andrea Nino MD, [facility] [address] [facility] [address] / [facility] [address]  Chief Complaint   Patient presents with     Hospital F/U     [facility] Medical Record Number:  [id number]  Place of Service where encounter took place:  [facility] ([facility]) [[id number]]    Minerva Blanco  is a 74 year old  (1945), admitted to the above facility from  [facility]. Hospital stay 4/22/2019 through 5/7/2019..  Admitted to this facility for  rehab, medical management and nursing care.    HPI:    HPI information obtained from: facility chart records, facility staff and patient report.   Brief Summary of Hospital Course:   74-year-old female history of coronary artery disease status post PCI of the proximal mid RCA on April 20, 2018, MS, hypertension, dyslipidemia admitted to [facility] on April 22 found to have T12-L1 compression fractures.  Initial treatment was with IV steroids.  Second opinion led to surgical intervention.  She underwent stage I posterior fusion T9 to pelvis with laminectomy T11-L1, hardware removal L5-S1.  Postop she received multiple blood transfusions and was in ICU due to hypotension, requiring pressor.  After hemoglobin improved she return to operating room for stage II ASF T12-L2 with corpectomy at L1.  No postop complications.  Hemoglobin remained stable.  On May 3 patient was found to have an acute thrombosed left brachial artery with threatened left hand suspected secondary to brachial artery line.  Underwent thrombectomy.  Was on IV heparin which was transitioned to aspirin 3 and 25 mg.  She did develop bilateral lower extremity pitting edema seen by lymphedema for wraps.  Developed a cough.  CT showed moderate bilateral pleural effusions, right greater than left  Echocardiogram shows normal left ventricular  function, ejection fraction 65%.  No regional wall motion abnormalities.  Normal left ventricular wall thickness    Updates on Status Since Skilled nursing Admission:   Patient is admitted to TCU yesterday.  Since that time staff's primary concern is her weight being edema.  Also concerned about a cough.  Patient's main concern also is her weeping edema.  Mainly due to the discomfort of always being moist.  No concerns regarding the surgery.  Intake fair, slept fair last night.  Denies any chest pain, shortness of breath, lightheadedness, dizziness, headaches.  Despite weeping edema, denies pain in extremities.  Does continue to experience her chronic diarrhea.  Concern regarding making it to the bathroom in time with adequate staff assistance    CODE STATUS/ADVANCE DIRECTIVES DISCUSSION:   CPR/Full code   Patient's living condition: lives alone  ALLERGIES: Amoxicillin; Ativan [lorazepam]; and Morphine  PAST MEDICAL HISTORY:  has a past medical history of Arrhythmia, Atrial fibrillation (H), Breast cancer (H) (2007), Chronic infection, Esophageal perforation, Fibromyalgia, GERD (gastroesophageal reflux disease), Hiatal hernia, History of blood transfusion, IBS (irritable bowel syndrome), Meniere's disease, MS (multiple sclerosis) (H), Multiple sclerosis (H), Noninfectious ileitis, and Other chronic pain. She also has no past medical history of Anemia, Diabetes (H), or PONV (postoperative nausea and vomiting).  PAST SURGICAL HISTORY:   has a past surgical history that includes Esophagoscopy, gastroscopy, duodenoscopy (EGD), combined (N/A, 4/18/2016); Pharyngostomy (N/A, 4/18/2016); Esophagoscopy, gastroscopy, duodenoscopy (EGD), combined (N/A, 4/25/2016); Pharyngostomy (N/A, 4/25/2016); appendectomy; Thoracotomy (Right, 1998); back surgery (5/1/2015); Wrist surgery; Nissen fundoplication (1/2016); GASTROSTOMY/JEJUN TUBE; Esophagoscopy, gastroscopy, duodenoscopy (EGD), combined (N/A, 5/4/2016); Pharyngostomy (N/A,  5/4/2016); Esophagoscopy, gastroscopy, duodenoscopy (EGD), combined (N/A, 5/18/2016); Esophagoscopy, gastroscopy, duodenoscopy (EGD), combined (N/A, 6/22/2016); Pharyngostomy (N/A, 6/22/2016); Esophagoscopy, gastroscopy, duodenoscopy (EGD), dilatation, combined (N/A, 6/29/2016); Irrigation and debridement chest washout, combined (N/A, 6/29/2016); Pharyngostomy (N/A, 6/29/2016); UGI ENDOSCOPY W TRANSENDOSCOPIC STENT PLACEMENT (N/A, 7/12/2016); Esophagoscopy, gastroscopy, duodenoscopy (EGD), combined (N/A, 7/12/2016); UGI ENDOSCOPY W TRANSENDOSCOPIC STENT PLACEMENT (N/A, 7/22/2016); picc insertion (Left, 8/25/2016); Combined Esophagoscopy, Gastroscopy, Duodenoscopy (EGD), Remove Esophageal Stent (N/A, 8/26/2016); Nerve block peripheral (N/A, 8/30/2016); Lumpectomy breast (Right, 2007); Laparoscopy diagnostic (general) (N/A, 1/9/2017); Thoracotomy (Left, 1/9/2017); Esophagectomy (N/A, 1/9/2017); Create spit fistula (N/A, 1/9/2017); Laparoscopy diagnostic (general) (N/A, 4/17/2017); Close spit fistula (N/A, 4/17/2017); Laparoscopic assisted insertion tube jejunostomy (N/A, 4/17/2017); Bronchoscopy flexible (N/A, 4/17/2017); Esophagoscopy, gastroscopy, duodenoscopy (EGD), combined (N/A, 4/21/2017); Pharyngostomy (N/A, 4/21/2017); Esophagoscopy, gastroscopy, duodenoscopy (EGD), combined (N/A, 5/19/2017); Explore neck (N/A, 5/19/2017); Irrigation and debridement chest washout, combined (N/A, 5/23/2017); Esophagoscopy, gastroscopy, duodenoscopy (EGD), combined (N/A, 5/23/2017); Irrigation and debridement chest washout, combined (N/A, 5/24/2017); Irrigation and debridement chest washout, combined (N/A, 5/25/2017); Irrigation and debridement chest washout, combined (N/A, 5/26/2017); Incision and drainage chest washout, combined (N/A, 5/27/2017); Incision and drainage chest washout, combined (N/A, 5/28/2017); Irrigation and debridement chest washout, combined (N/A, 5/30/2017); PICC INSERTION - Rewire (Right, 05/31/2017);  Irrigation and debridement chest washout, combined (N/A, 5/31/2017); Pharyngostomy (Left, 5/31/2017); Irrigation and debridement chest washout, combined (N/A, 6/1/2017); Irrigation and debridement chest washout, combined (N/A, 6/4/2017); Irrigation and debridement chest washout, combined (N/A, 6/2/2017); Incision and drainage chest washout, combined (N/A, 6/9/2017); Esophagoscopy, gastroscopy, duodenoscopy (EGD), combined (N/A, 6/27/2017); Incision and drainage chest washout, combined (N/A, 7/17/2017); Esophagoscopy, gastroscopy, duodenoscopy (EGD), combined (N/A, 8/4/2017); Esophagoscopy, gastroscopy, duodenoscopy (EGD), combined (N/A, 8/25/2017); Combined Esophagoscopy, Gastroscopy, Duodenoscopy (EGD), Replace Esophageal Stent (N/A, 8/25/2017); Combined Esophagoscopy, Gastroscopy, Duodenoscopy (EGD), Replace Esophageal Stent (N/A, 9/15/2017); Repair fistula tracheoesophageal (N/A, 9/15/2017); Combined Esophagoscopy, Gastroscopy, Duodenoscopy (EGD), Replace Esophageal Stent (N/A, 10/20/2017); Esophagoscopy, gastroscopy, duodenoscopy (EGD), combined (N/A, 10/2/2017); Combined Esophagoscopy, Gastroscopy, Duodenoscopy (EGD), Replace Esophageal Stent (N/A, 11/3/2017); Combined Esophagoscopy, Gastroscopy, Duodenoscopy (EGD), Replace Esophageal Stent (N/A, 11/17/2017); and Combined Esophagoscopy, Gastroscopy, Duodenoscopy (EGD), Remove Esophageal Stent (N/A, 2/12/2018).  FAMILY HISTORY: family history includes Alzheimer Disease in her mother; Breast Cancer in her daughter; Cerebrovascular Disease in her father; Ovarian Cancer in her sister.  SOCIAL HISTORY:   reports that she quit smoking about 21 years ago. Her smoking use included cigarettes. She has a 15.00 pack-year smoking history. She has never used smokeless tobacco. She reports that she does not drink alcohol or use drugs.    Post Discharge Medication Reconciliation Status: discharge medications reconciled and changed, per note/orders (see AVS)    Current  Outpatient Medications   Medication Sig Dispense Refill     Acetaminophen (TYLENOL EXTRA STRENGTH PO) Take 1,000 mg by mouth 3 times daily And 500 mg by mouth 2 times daily as needed       aspirin (ASA) 325 MG EC tablet Take 325 mg by mouth daily       atorvastatin (LIPITOR) 80 MG tablet Take 80 mg by mouth daily        benzonatate (TESSALON) 100 MG capsule Take 100 mg by mouth 3 times daily as needed for cough       Calcium Carb-Cholecalciferol (OS-ANNABELLE PO) Take 1 tablet by mouth 3 times daily (with meals)       DULoxetine (CYMBALTA) 30 MG capsule Take 30 mg by mouth daily       ferrous sulfate (FEROSUL) 325 (65 Fe) MG tablet Take 325 mg by mouth daily (with breakfast)       furosemide (LASIX) 20 MG tablet Take 20 mg by mouth 2 times daily       gabapentin (NEURONTIN) 400 MG capsule Take 400 mg by mouth 2 times daily       HYDROmorphone (DILAUDID) 2 MG tablet Take 2 mg by mouth every 3 hours as needed for severe pain       lidocaine (LIDODERM) 5 % patch Apply to painful area topically 2 times a day - 12 hours on and 12 hours off       loperamide (IMODIUM A-D) 2 MG tablet Give 2-4 mg by mouth as needed       magnesium oxide 400 MG CAPS Take 400 mg by mouth 2 times daily       MELATONIN PO Take 10 mg by mouth At Bedtime        metoprolol succinate (TOPROL-XL) 50 MG 24 hr tablet Take 50 mg by mouth daily        multivitamin w/minerals (THERA-VIT-M) tablet Take 1 tablet by mouth daily       omeprazole 20 MG tablet Take 1 tablet (20 mg) by mouth 2 times daily 180 tablet 3     polyethylene glycol (MIRALAX/GLYCOLAX) packet Take 1 packet by mouth daily as needed for constipation       potassium chloride ER (K-DUR/KLOR-CON M) 20 MEQ CR tablet Take 40 mEq by mouth daily        TRAZODONE HCL PO Take 100 mg by mouth nightly as needed for sleep       medical cannabis (Patient's own supply.  Not a prescription) 1 Units         ROS:  10 point ROS of systems including Constitutional, Eyes, Respiratory, Cardiovascular,  Gastroenterology, Genitourinary, Integumentary, Musculoskeletal, Psychiatric were all negative except for pertinent positives noted in my HPI.    Vitals:  /64   Pulse 78   Temp 97.7  F (36.5  C)   Resp 18   Wt 51.2 kg (112 lb 12.8 oz)   SpO2 95%   BMI 22.03 kg/m     Exam:  GENERAL APPEARANCE:  Alert, in no distress, anxious, Lying in bed.  Wearing TLSO  ENT:  Mouth and posterior oropharynx normal, moist mucous membranes, normal hearing acuity  RESP:  lungs clear to auscultation , no respiratory distress, Somewhat difficult to assess all lung fields with TLSO in place.  Did loosen up slightly to auscultate posterior lobes.  No adventitious sounds despite irritating cough  CV:  Palpation and auscultation of heart done , regular rate and rhythm, no murmur, rub, or gallop, 4+ weeping edema all 4 extremities  ABDOMEN:  no guarding or rebound, bowel sounds normal  M/S:   Limited back range of motion.  Wearing TLSO.  Strength left lower extremity 2 /5 right lower extremity 1/5  SKIN:  No erythema or signs of purulent drainage.  Multiple red and scaly areas and shins.  Weeping clear yellow fluid all extremities.  See photos.  Left arm postop area where blood clot removed clean, dry, intact.  Some erythema surrounding  NEURO:   Neuropathy lower extremities  PSYCH:  Fatigued, depressed and anxious mood                              Lab/Diagnostic data:  Recent labs in [facility] reviewed by me today.     ASSESSMENT/PLAN:    Closed compression fracture of L1 lumbar vertebra with routine healing, subsequent encounter  Spinal stenosis of lumbar region with neurogenic claudication  S/P lumbar laminectomy  Difficult for patient to much difference as extremities are edematous and weeping and motion is limited.  Currently pain managed with gabapentin 400 mg twice daily, Cymbalta 30 mg daily.  Will consider increase to 60 if pain management suboptimal.  Has Dilaudid 2 mg every 3 hours as needed (has used approximately every 8  hours) and Tylenol as needed.  Aspirin 325 mg for DVT prophylaxis  Wearing TLSO  Participating in therapies.  Follow-up with spine surgeon on May 14      Brachial artery thrombosis (H)  Continue with aspirin 325 mg, monitor site for healing    Postoperative anemia due to acute blood loss  Hemoglobin 8.1 in hospital discharge.  On iron supplements will recheck hemoglobin for stability    Lymphedema  All extremities.  Currently applying nonadhering Adaptic covered with ABD or heavy drainage pads, wrapped in Kerlix, wrapped in Ace.  Will have lymphedema therapist visit ASAP.  Will try burst of Lasix.  Will go to twice daily for increased efficacy.  We will also replace potassium during those days.  Will monitor blood pressure closely as it is already on the low side.    Coronary artery disease involving native coronary artery of native heart without angina pectoris  Asymptomatic.  Other than irritating cough.  Status post PCI proximal mid RCA.  2 stent placements, inferior MI last year  Continue with aspirin 325 daily metoprolol 50 mg daily, Lipitor 80 mg daily  Follow with cardiology    Essential hypertension  Managed with metoprolol  Blood pressures acceptable range.  On the soft side.  Continue to monitor and adjust as indicated  Blood pressures:  05/08/2019 13:34 114/63 mmHg  05/08/2019 10:06 108/58 mmHg  05/08/2019 05:08 120/64 mmHg  05/08/2019 01:50 112/59 mmHg  05/07/2019 21:50 106/68 mmHg  05/07/2019 17:50 126/68 mmHg    Hypokalemia  Possible secondary to chronic diarrhea.  Replaced in hospital.  Currently on potassium chloride 20 mill equivalents daily.  At hospital discharge potassium was 3.7, magnesium 1.4.  Loperamide for chronic diarrhea.  Will continue to monitor and adjust as indicated    Cough  Intermittently productive cough with clear or yellow sputum.  Lungs clear instructed and demonstrated incentive spirometry today.  Only able to make 500.  Encourage continued use  Continue with Tessalon  [place]    Hx of esophagectomy  Long history with esophageal perforation status post gastric surgery.  Has been chronically on high dose of omeprazole 40 mg twice daily.  Discharge from hospital with 20 mg twice daily.  Discussed with patient's the risks of high-dose omeprazole including risk for C. difficile.  We will continue on 20 mg twice daily, continue to monitor, and increase if necessary.    Adjustment Disorder  Insomnia, unspecified type  Patient experiencing numerous stressors and adjusting.  Has had multiple medical complications in the past 3 years including multiple GI surgeries to feedings cardiac issues and now the back surgery.  In addition her   in September he was her primary caregiver.  Is agreeable to visit with  and in-house psychotherapist.  Will continue with trazodone 100 mg nightly and melatonin 3 mg nightly to facilitate sleep       transcribed by : Camille Muse  Orders:  1. Lymphedema consult for lower and upper extremity edema  2. Lasix 20 mg PO BID x 3 days  3. Blood pressure check QID. Hold lasix if SBP <95  4. Daily weights - same time, same w/c  5. Next lab day: CBC (anemia), BMP (decreased potassium, diurectic), Mag (decreased mag)  6. Increased KCL to 40 meq/day for 3 days while on lasix  7.  referral - Dx: adjustment, grief  8. In house psych referral - Dx: adjustment & grief  9. Schedule Tylenol 1000 mg PO TID  10. Cancel current PRN Tylenol   11. Tylenol 500 mg PO BID PRN - Dx: breakthrough pain    Total time spent with patient visit at the skilled nursing facility was 45 min including patient visit and review of past records. Greater than 50% of total time spent with counseling and coordinating care due to admission  Electronically signed by:  CLIVE Mcguire CNP                     Sincerely,        CLIVE Mcguire CNP

## 2019-05-08 NOTE — PROGRESS NOTES
[facility]  [place] [facility] Record Number:  [id number]  Place of Service where encounter took place:  [facility] ([facility]) [[id number]]  Chief Complaint   Patient presents with     RECHECK       HPI:    Minerva Blanco  is a 74 year old (1945), who is being seen today for an episodic care visit.  HPI information obtained from: facility chart records, facility staff and patient report. Today's concern is:     Closed compression fracture of L1 lumbar vertebra with routine healing, subsequent encounter  Spinal stenosis of lumbar region with neurogenic claudication  S/P lumbar laminectomy  Lymphedema   Patient states she is doing well.  Feeling much better.  No concerns  Therapist indicates that Edema is down 6 inches    Past Medical and Surgical History reviewed in Epic today.    MEDICATIONS:  Current Outpatient Medications   Medication Sig Dispense Refill     Acetaminophen (TYLENOL EXTRA STRENGTH PO) Take 1,000 mg by mouth 3 times daily And 500 mg by mouth 2 times daily as needed       aspirin (ASA) 325 MG EC tablet Take 325 mg by mouth daily       atorvastatin (LIPITOR) 80 MG tablet Take 80 mg by mouth daily        benzonatate (TESSALON) 100 MG capsule Take 100 mg by mouth 3 times daily as needed for cough       Calcium Carb-Cholecalciferol (OS-ANNABELLE PO) Take 1 tablet by mouth 3 times daily (with meals)       DULoxetine (CYMBALTA) 30 MG capsule Take 30 mg by mouth daily       ferrous sulfate (FEROSUL) 325 (65 Fe) MG tablet Take 325 mg by mouth daily (with breakfast)       gabapentin (NEURONTIN) 400 MG capsule Take 400 mg by mouth 2 times daily       HYDROmorphone (DILAUDID) 2 MG tablet Take 2 mg by mouth every 3 hours as needed for severe pain       lidocaine (LIDODERM) 5 % patch Apply to painful area topically 2 times a day - 12 hours on and 12 hours off       loperamide (IMODIUM A-D) 2 MG tablet Give 2-4 mg by mouth as needed       magnesium oxide 400 MG CAPS Take 400 mg by mouth 2 times  daily       MELATONIN PO Take 10 mg by mouth At Bedtime        metoprolol succinate (TOPROL-XL) 50 MG 24 hr tablet Take 50 mg by mouth daily        multivitamin w/minerals (THERA-VIT-M) tablet Take 1 tablet by mouth daily       omeprazole 20 MG tablet Take 1 tablet (20 mg) by mouth 2 times daily 180 tablet 3     polyethylene glycol (MIRALAX/GLYCOLAX) packet Take 1 packet by mouth daily as needed for constipation       TRAZODONE HCL PO Take 100 mg by mouth nightly as needed for sleep       furosemide (LASIX) 20 MG tablet Take 20 mg by mouth 2 times daily       medical cannabis (Patient's own supply.  Not a prescription) 1 Units       potassium chloride (KLOR-CON) 20 MEQ packet Take 20 mEq by mouth daily       ranitidine (ZANTAC) 75 MG tablet Take 150 mg by mouth 2 times daily         REVIEW OF SYSTEMS:  4 point ROS including Respiratory, CV, GI and , other than that noted in the HPI,  is negative    Objective:  /74   Pulse 67   Temp 97.6  F (36.4  C)   Resp 16   Ht 1.524 m (5')   Wt 50.2 kg (110 lb 9.6 oz)   SpO2 91%   BMI 21.60 kg/m    Exam:  GENERAL APPEARANCE:  Alert, in no distress, Sitting up in chair  RESP:  lungs clear to auscultation , no respiratory distress  CV:  regular rate and rhythm, no murmur, rub, or gallop, Edema in all extremities to abdomen.  Wrapped in lymphedema wraps    ASSESSMENT/PLAN:  Closed compression fracture of L1 lumbar vertebra with routine healing, subsequent encounter  Spinal stenosis of lumbar region with neurogenic claudication  S/P lumbar laminectomy  Analgesia optimal.  Participating well in therapies  Movement, functionality improved    Lymphedema  6 inch decrease in circumference lymphedema lower extremities  On second day of diuresis  Continue current plan.  We will follow-up early next week and with follow-up BMP        Electronically signed by:  CLIVE Mcguire CNP

## 2019-05-08 NOTE — PROGRESS NOTES
[facility]  PRIMARY CARE PROVIDER AND CLINIC:  Andrea Nino MD, [facility] [address] [facility] [address]  Chief Complaint   Patient presents with     Hospital F/U     [place] [facility] Record Number:  [id number]  Place of Service where encounter took place:  [facility] ([facility]) [[id number]]    Minerva Blanco  is a 74 year old  (1945), admitted to the above facility from  [facility]. Hospital stay 4/22/2019 through 5/7/2019..  Admitted to this facility for  rehab, medical management and nursing care.    HPI:    HPI information obtained from: facility chart records, facility staff and patient report.   Brief Summary of Hospital Course:   74-year-old female history of coronary artery disease status post PCI of the proximal mid RCA on April 20, 2018, MS, hypertension, dyslipidemia admitted to [facility] on April 22 found to have T12-L1 compression fractures.  Initial treatment was with IV steroids.  Second opinion led to surgical intervention.  She underwent stage I posterior fusion T9 to pelvis with laminectomy T11-L1, hardware removal L5-S1.  Postop she received multiple blood transfusions and was in ICU due to hypotension, requiring pressor.  After hemoglobin improved she return to operating room for stage II ASF T12-L2 with corpectomy at L1.  No postop complications.  Hemoglobin remained stable.  On May 3 patient was found to have an acute thrombosed left brachial artery with threatened left hand suspected secondary to brachial artery line.  Underwent thrombectomy.  Was on IV heparin which was transitioned to aspirin 3 and 25 mg.  She did develop bilateral lower extremity pitting edema seen by lymphedema for wraps.  Developed a cough.  CT showed moderate bilateral pleural effusions, right greater than left  Echocardiogram shows normal left ventricular function, ejection fraction 65%.  No regional wall motion abnormalities.  Normal left ventricular wall  thickness    Updates on Status Since Skilled nursing Admission:   Patient is admitted to TCU yesterday.  Since that time staff's primary concern is her weight being edema.  Also concerned about a cough.  Patient's main concern also is her weeping edema.  Mainly due to the discomfort of always being moist.  No concerns regarding the surgery.  Intake fair, slept fair last night.  Denies any chest pain, shortness of breath, lightheadedness, dizziness, headaches.  Despite weeping edema, denies pain in extremities.  Does continue to experience her chronic diarrhea.  Concern regarding making it to the bathroom in time with adequate staff assistance    CODE STATUS/ADVANCE DIRECTIVES DISCUSSION:   CPR/Full code   Patient's living condition: lives alone  ALLERGIES: Amoxicillin; Ativan [lorazepam]; and Morphine  PAST MEDICAL HISTORY:  has a past medical history of Arrhythmia, Atrial fibrillation (H), Breast cancer (H) (2007), Chronic infection, Esophageal perforation, Fibromyalgia, GERD (gastroesophageal reflux disease), Hiatal hernia, History of blood transfusion, IBS (irritable bowel syndrome), Meniere's disease, MS (multiple sclerosis) (H), Multiple sclerosis (H), Noninfectious ileitis, and Other chronic pain. She also has no past medical history of Anemia, Diabetes (H), or PONV (postoperative nausea and vomiting).  PAST SURGICAL HISTORY:   has a past surgical history that includes Esophagoscopy, gastroscopy, duodenoscopy (EGD), combined (N/A, 4/18/2016); Pharyngostomy (N/A, 4/18/2016); Esophagoscopy, gastroscopy, duodenoscopy (EGD), combined (N/A, 4/25/2016); Pharyngostomy (N/A, 4/25/2016); appendectomy; Thoracotomy (Right, 1998); back surgery (5/1/2015); Wrist surgery; Nissen fundoplication (1/2016); GASTROSTOMY/JEJUN TUBE; Esophagoscopy, gastroscopy, duodenoscopy (EGD), combined (N/A, 5/4/2016); Pharyngostomy (N/A, 5/4/2016); Esophagoscopy, gastroscopy, duodenoscopy (EGD), combined (N/A, 5/18/2016); Esophagoscopy,  gastroscopy, duodenoscopy (EGD), combined (N/A, 6/22/2016); Pharyngostomy (N/A, 6/22/2016); Esophagoscopy, gastroscopy, duodenoscopy (EGD), dilatation, combined (N/A, 6/29/2016); Irrigation and debridement chest washout, combined (N/A, 6/29/2016); Pharyngostomy (N/A, 6/29/2016); UGI ENDOSCOPY W TRANSENDOSCOPIC STENT PLACEMENT (N/A, 7/12/2016); Esophagoscopy, gastroscopy, duodenoscopy (EGD), combined (N/A, 7/12/2016); UGI ENDOSCOPY W TRANSENDOSCOPIC STENT PLACEMENT (N/A, 7/22/2016); picc insertion (Left, 8/25/2016); Combined Esophagoscopy, Gastroscopy, Duodenoscopy (EGD), Remove Esophageal Stent (N/A, 8/26/2016); Nerve block peripheral (N/A, 8/30/2016); Lumpectomy breast (Right, 2007); Laparoscopy diagnostic (general) (N/A, 1/9/2017); Thoracotomy (Left, 1/9/2017); Esophagectomy (N/A, 1/9/2017); Create spit fistula (N/A, 1/9/2017); Laparoscopy diagnostic (general) (N/A, 4/17/2017); Close spit fistula (N/A, 4/17/2017); Laparoscopic assisted insertion tube jejunostomy (N/A, 4/17/2017); Bronchoscopy flexible (N/A, 4/17/2017); Esophagoscopy, gastroscopy, duodenoscopy (EGD), combined (N/A, 4/21/2017); Pharyngostomy (N/A, 4/21/2017); Esophagoscopy, gastroscopy, duodenoscopy (EGD), combined (N/A, 5/19/2017); Explore neck (N/A, 5/19/2017); Irrigation and debridement chest washout, combined (N/A, 5/23/2017); Esophagoscopy, gastroscopy, duodenoscopy (EGD), combined (N/A, 5/23/2017); Irrigation and debridement chest washout, combined (N/A, 5/24/2017); Irrigation and debridement chest washout, combined (N/A, 5/25/2017); Irrigation and debridement chest washout, combined (N/A, 5/26/2017); Incision and drainage chest washout, combined (N/A, 5/27/2017); Incision and drainage chest washout, combined (N/A, 5/28/2017); Irrigation and debridement chest washout, combined (N/A, 5/30/2017); PICC INSERTION - Rewire (Right, 05/31/2017); Irrigation and debridement chest washout, combined (N/A, 5/31/2017); Pharyngostomy (Left, 5/31/2017);  Irrigation and debridement chest washout, combined (N/A, 6/1/2017); Irrigation and debridement chest washout, combined (N/A, 6/4/2017); Irrigation and debridement chest washout, combined (N/A, 6/2/2017); Incision and drainage chest washout, combined (N/A, 6/9/2017); Esophagoscopy, gastroscopy, duodenoscopy (EGD), combined (N/A, 6/27/2017); Incision and drainage chest washout, combined (N/A, 7/17/2017); Esophagoscopy, gastroscopy, duodenoscopy (EGD), combined (N/A, 8/4/2017); Esophagoscopy, gastroscopy, duodenoscopy (EGD), combined (N/A, 8/25/2017); Combined Esophagoscopy, Gastroscopy, Duodenoscopy (EGD), Replace Esophageal Stent (N/A, 8/25/2017); Combined Esophagoscopy, Gastroscopy, Duodenoscopy (EGD), Replace Esophageal Stent (N/A, 9/15/2017); Repair fistula tracheoesophageal (N/A, 9/15/2017); Combined Esophagoscopy, Gastroscopy, Duodenoscopy (EGD), Replace Esophageal Stent (N/A, 10/20/2017); Esophagoscopy, gastroscopy, duodenoscopy (EGD), combined (N/A, 10/2/2017); Combined Esophagoscopy, Gastroscopy, Duodenoscopy (EGD), Replace Esophageal Stent (N/A, 11/3/2017); Combined Esophagoscopy, Gastroscopy, Duodenoscopy (EGD), Replace Esophageal Stent (N/A, 11/17/2017); and Combined Esophagoscopy, Gastroscopy, Duodenoscopy (EGD), Remove Esophageal Stent (N/A, 2/12/2018).  FAMILY HISTORY: family history includes Alzheimer Disease in her mother; Breast Cancer in her daughter; Cerebrovascular Disease in her father; Ovarian Cancer in her sister.  SOCIAL HISTORY:   reports that she quit smoking about 21 years ago. Her smoking use included cigarettes. She has a 15.00 pack-year smoking history. She has never used smokeless tobacco. She reports that she does not drink alcohol or use drugs.    Post Discharge Medication Reconciliation Status: discharge medications reconciled and changed, per note/orders (see AVS)    Current Outpatient Medications   Medication Sig Dispense Refill     Acetaminophen (TYLENOL EXTRA STRENGTH PO) Take  1,000 mg by mouth 3 times daily And 500 mg by mouth 2 times daily as needed       aspirin (ASA) 325 MG EC tablet Take 325 mg by mouth daily       atorvastatin (LIPITOR) 80 MG tablet Take 80 mg by mouth daily        benzonatate (TESSALON) 100 MG capsule Take 100 mg by mouth 3 times daily as needed for cough       Calcium Carb-Cholecalciferol (OS-ANNABELLE PO) Take 1 tablet by mouth 3 times daily (with meals)       DULoxetine (CYMBALTA) 30 MG capsule Take 30 mg by mouth daily       ferrous sulfate (FEROSUL) 325 (65 Fe) MG tablet Take 325 mg by mouth daily (with breakfast)       furosemide (LASIX) 20 MG tablet Take 20 mg by mouth 2 times daily       gabapentin (NEURONTIN) 400 MG capsule Take 400 mg by mouth 2 times daily       HYDROmorphone (DILAUDID) 2 MG tablet Take 2 mg by mouth every 3 hours as needed for severe pain       lidocaine (LIDODERM) 5 % patch Apply to painful area topically 2 times a day - 12 hours on and 12 hours off       loperamide (IMODIUM A-D) 2 MG tablet Give 2-4 mg by mouth as needed       magnesium oxide 400 MG CAPS Take 400 mg by mouth 2 times daily       MELATONIN PO Take 10 mg by mouth At Bedtime        metoprolol succinate (TOPROL-XL) 50 MG 24 hr tablet Take 50 mg by mouth daily        multivitamin w/minerals (THERA-VIT-M) tablet Take 1 tablet by mouth daily       omeprazole 20 MG tablet Take 1 tablet (20 mg) by mouth 2 times daily 180 tablet 3     polyethylene glycol (MIRALAX/GLYCOLAX) packet Take 1 packet by mouth daily as needed for constipation       potassium chloride ER (K-DUR/KLOR-CON M) 20 MEQ CR tablet Take 40 mEq by mouth daily        TRAZODONE HCL PO Take 100 mg by mouth nightly as needed for sleep       medical cannabis (Patient's own supply.  Not a prescription) 1 Units         ROS:  10 point ROS of systems including Constitutional, Eyes, Respiratory, Cardiovascular, Gastroenterology, Genitourinary, Integumentary, Musculoskeletal, Psychiatric were all negative except for pertinent  positives noted in my HPI.    Vitals:  /64   Pulse 78   Temp 97.7  F (36.5  C)   Resp 18   Wt 51.2 kg (112 lb 12.8 oz)   SpO2 95%   BMI 22.03 kg/m    Exam:  GENERAL APPEARANCE:  Alert, in no distress, anxious, Lying in bed.  Wearing TLSO  ENT:  Mouth and posterior oropharynx normal, moist mucous membranes, normal hearing acuity  RESP:  lungs clear to auscultation , no respiratory distress, Somewhat difficult to assess all lung fields with TLSO in place.  Did loosen up slightly to auscultate posterior lobes.  No adventitious sounds despite irritating cough  CV:  Palpation and auscultation of heart done , regular rate and rhythm, no murmur, rub, or gallop, 4+ weeping edema all 4 extremities  ABDOMEN:  no guarding or rebound, bowel sounds normal  M/S:   Limited back range of motion.  Wearing TLSO.  Strength left lower extremity 2 /5 right lower extremity 1/5  SKIN:  No erythema or signs of purulent drainage.  Multiple red and scaly areas and shins.  Weeping clear yellow fluid all extremities.  See photos.  Left arm postop area where blood clot removed clean, dry, intact.  Some erythema surrounding  NEURO:   Neuropathy lower extremities  PSYCH:  Fatigued, depressed and anxious mood                              Lab/Diagnostic data:  Recent labs in [facility] reviewed by me today.     ASSESSMENT/PLAN:    Closed compression fracture of L1 lumbar vertebra with routine healing, subsequent encounter  Spinal stenosis of lumbar region with neurogenic claudication  S/P lumbar laminectomy  Difficult for patient to much difference as extremities are edematous and weeping and motion is limited.  Currently pain managed with gabapentin 400 mg twice daily, Cymbalta 30 mg daily.  Will consider increase to 60 if pain management suboptimal.  Has Dilaudid 2 mg every 3 hours as needed (has used approximately every 8 hours) and Tylenol as needed.  Aspirin 325 mg for DVT prophylaxis  Wearing TLSO  Participating in therapies.  Follow-up  with spine surgeon on May 14      Brachial artery thrombosis (H)  Continue with aspirin 325 mg, monitor site for healing    Postoperative anemia due to acute blood loss  Hemoglobin 8.1 in hospital discharge.  On iron supplements will recheck hemoglobin for stability    Lymphedema  All extremities.  Currently applying nonadhering Adaptic covered with ABD or heavy drainage pads, wrapped in Kerlix, wrapped in Ace.  Will have lymphedema therapist visit ASAP.  Will try burst of Lasix.  Will go to twice daily for increased efficacy.  We will also replace potassium during those days.  Will monitor blood pressure closely as it is already on the low side.    Coronary artery disease involving native coronary artery of native heart without angina pectoris  Asymptomatic.  Other than irritating cough.  Status post PCI proximal mid RCA.  2 stent placements, inferior MI last year  Continue with aspirin 325 daily metoprolol 50 mg daily, Lipitor 80 mg daily  Follow with cardiology    Essential hypertension  Managed with metoprolol  Blood pressures acceptable range.  On the soft side.  Continue to monitor and adjust as indicated  Blood pressures:  05/08/2019 13:34 114/63 mmHg  05/08/2019 10:06 108/58 mmHg  05/08/2019 05:08 120/64 mmHg  05/08/2019 01:50 112/59 mmHg  05/07/2019 21:50 106/68 mmHg  05/07/2019 17:50 126/68 mmHg    Hypokalemia  Possible secondary to chronic diarrhea.  Replaced in hospital.  Currently on potassium chloride 20 mill equivalents daily.  At hospital discharge potassium was 3.7, magnesium 1.4.  Loperamide for chronic diarrhea.  Will continue to monitor and adjust as indicated    Cough  Intermittently productive cough with clear or yellow sputum.  Lungs clear instructed and demonstrated incentive spirometry today.  Only able to make 500.  Encourage continued use  Continue with Tessalon Perles    Hx of esophagectomy  Long history with esophageal perforation status post gastric surgery.  Has been chronically on  high dose of omeprazole 40 mg twice daily.  Discharge from hospital with 20 mg twice daily.  Discussed with patient's the risks of high-dose omeprazole including risk for C. difficile.  We will continue on 20 mg twice daily, continue to monitor, and increase if necessary.    Adjustment Disorder  Insomnia, unspecified type  Patient experiencing numerous stressors and adjusting.  Has had multiple medical complications in the past 3 years including multiple GI surgeries to feedings cardiac issues and now the back surgery.  In addition her   in September he was her primary caregiver.  Is agreeable to visit with  and in-house psychotherapist.  Will continue with trazodone 100 mg nightly and melatonin 3 mg nightly to facilitate sleep       transcribed by : Camille Muse  Orders:  1. Lymphedema consult for lower and upper extremity edema  2. Lasix 20 mg PO BID x 3 days  3. Blood pressure check QID. Hold lasix if SBP <95  4. Daily weights - same time, same w/c  5. Next lab day: CBC (anemia), BMP (decreased potassium, diurectic), Mag (decreased mag)  6. Increased KCL to 40 meq/day for 3 days while on lasix  7.  referral - Dx: adjustment, grief  8. In house psych referral - Dx: adjustment & grief  9. Schedule Tylenol 1000 mg PO TID  10. Cancel current PRN Tylenol   11. Tylenol 500 mg PO BID PRN - Dx: breakthrough pain    Total time spent with patient visit at the skilled nursing facility was 45 min including patient visit and review of past records. Greater than 50% of total time spent with counseling and coordinating care due to admission  Electronically signed by:  CLIVE Mcguire CNP

## 2019-05-09 ENCOUNTER — DOCUMENTATION ONLY (OUTPATIENT)
Dept: OTHER | Facility: CLINIC | Age: 74
End: 2019-05-09

## 2019-05-09 ENCOUNTER — AMBULATORY - HEALTHEAST (OUTPATIENT)
Dept: OTHER | Facility: CLINIC | Age: 74
End: 2019-05-09

## 2019-05-09 VITALS
SYSTOLIC BLOOD PRESSURE: 120 MMHG | TEMPERATURE: 97.6 F | OXYGEN SATURATION: 91 % | WEIGHT: 110.6 LBS | DIASTOLIC BLOOD PRESSURE: 74 MMHG | HEIGHT: 60 IN | HEART RATE: 67 BPM | RESPIRATION RATE: 16 BRPM | BODY MASS INDEX: 21.71 KG/M2

## 2019-05-09 PROBLEM — Z71.89 ADVANCED DIRECTIVES, COUNSELING/DISCUSSION: Chronic | Status: RESOLVED | Noted: 2017-05-03 | Resolved: 2019-05-09

## 2019-05-09 ASSESSMENT — MIFFLIN-ST. JEOR: SCORE: 928.18

## 2019-05-09 ASSESSMENT — PAIN SCALES - GENERAL: PAINLEVEL: SEVERE PAIN (7)

## 2019-05-10 ENCOUNTER — NURSING HOME VISIT (OUTPATIENT)
Dept: GERIATRICS | Facility: CLINIC | Age: 74
End: 2019-05-10

## 2019-05-10 DIAGNOSIS — Z98.890 S/P LUMBAR LAMINECTOMY: ICD-10-CM

## 2019-05-10 DIAGNOSIS — S32.010D CLOSED COMPRESSION FRACTURE OF L1 LUMBAR VERTEBRA WITH ROUTINE HEALING, SUBSEQUENT ENCOUNTER: Primary | ICD-10-CM

## 2019-05-10 DIAGNOSIS — M48.062 SPINAL STENOSIS OF LUMBAR REGION WITH NEUROGENIC CLAUDICATION: ICD-10-CM

## 2019-05-10 DIAGNOSIS — I89.0 LYMPHEDEMA: ICD-10-CM

## 2019-05-10 PROCEDURE — 99308 SBSQ NF CARE LOW MDM 20: CPT | Performed by: NURSE PRACTITIONER

## 2019-05-10 NOTE — LETTER
5/10/2019        RE: Minerva Blanco  [address]  [place] [address]        [facility]  [place] Medical Record Number:  [id number]  Place of Service where encounter took place:  [facility] ([facility] [[id number]]  Chief Complaint   Patient presents with     RECHECK       HPI:    Minerva Blanco  is a 74 year old (1945), who is being seen today for an episodic care visit.  HPI information obtained from: facility chart records, facility staff and patient report. Today's concern is:     Closed compression fracture of L1 lumbar vertebra with routine healing, subsequent encounter  Spinal stenosis of lumbar region with neurogenic claudication  S/P lumbar laminectomy  Lymphedema   Patient states she is doing well.  Feeling much better.  No concerns  Therapist indicates that Edema is down 6 inches    Past Medical and Surgical History reviewed in Epic today.    MEDICATIONS:  Current Outpatient Medications   Medication Sig Dispense Refill     Acetaminophen (TYLENOL EXTRA STRENGTH PO) Take 1,000 mg by mouth 3 times daily And 500 mg by mouth 2 times daily as needed       aspirin (ASA) 325 MG EC tablet Take 325 mg by mouth daily       atorvastatin (LIPITOR) 80 MG tablet Take 80 mg by mouth daily        benzonatate (TESSALON) 100 MG capsule Take 100 mg by mouth 3 times daily as needed for cough       Calcium Carb-Cholecalciferol (OS-ANNABELLE PO) Take 1 tablet by mouth 3 times daily (with meals)       DULoxetine (CYMBALTA) 30 MG capsule Take 30 mg by mouth daily       ferrous sulfate (FEROSUL) 325 (65 Fe) MG tablet Take 325 mg by mouth daily (with breakfast)       gabapentin (NEURONTIN) 400 MG capsule Take 400 mg by mouth 2 times daily       HYDROmorphone (DILAUDID) 2 MG tablet Take 2 mg by mouth every 3 hours as needed for severe pain       lidocaine (LIDODERM) 5 % patch Apply to painful area topically 2 times a day - 12 hours on and 12 hours off       loperamide (IMODIUM A-D) 2  MG tablet Give 2-4 mg by mouth as needed       magnesium oxide 400 MG CAPS Take 400 mg by mouth 2 times daily       MELATONIN PO Take 10 mg by mouth At Bedtime        metoprolol succinate (TOPROL-XL) 50 MG 24 hr tablet Take 50 mg by mouth daily        multivitamin w/minerals (THERA-VIT-M) tablet Take 1 tablet by mouth daily       omeprazole 20 MG tablet Take 1 tablet (20 mg) by mouth 2 times daily 180 tablet 3     polyethylene glycol (MIRALAX/GLYCOLAX) packet Take 1 packet by mouth daily as needed for constipation       TRAZODONE HCL PO Take 100 mg by mouth nightly as needed for sleep       furosemide (LASIX) 20 MG tablet Take 20 mg by mouth 2 times daily       medical cannabis (Patient's own supply.  Not a prescription) 1 Units       potassium chloride (KLOR-CON) 20 MEQ packet Take 20 mEq by mouth daily       ranitidine (ZANTAC) 75 MG tablet Take 150 mg by mouth 2 times daily         REVIEW OF SYSTEMS:  4 point ROS including Respiratory, CV, GI and , other than that noted in the HPI,  is negative    Objective:  /74   Pulse 67   Temp 97.6  F (36.4  C)   Resp 16   Ht 1.524 m (5')   Wt 50.2 kg (110 lb 9.6 oz)   SpO2 91%   BMI 21.60 kg/m     Exam:  GENERAL APPEARANCE:  Alert, in no distress, Sitting up in chair  RESP:  lungs clear to auscultation , no respiratory distress  CV:  regular rate and rhythm, no murmur, rub, or gallop, Edema in all extremities to abdomen.  Wrapped in lymphedema wraps    ASSESSMENT/PLAN:  Closed compression fracture of L1 lumbar vertebra with routine healing, subsequent encounter  Spinal stenosis of lumbar region with neurogenic claudication  S/P lumbar laminectomy  Analgesia optimal.  Participating well in therapies  Movement, functionality improved    Lymphedema  6 inch decrease in circumference lymphedema lower extremities  On second day of diuresis  Continue current plan.  We will follow-up early next week and with follow-up BMP        Electronically signed by:  Keisha CAMERON  Seng, CLIVE GIMENEZ           Sincerely,        CLIVE Mcguire CNP

## 2019-05-13 VITALS
DIASTOLIC BLOOD PRESSURE: 51 MMHG | SYSTOLIC BLOOD PRESSURE: 100 MMHG | RESPIRATION RATE: 21 BRPM | WEIGHT: 106 LBS | HEIGHT: 60 IN | HEART RATE: 68 BPM | OXYGEN SATURATION: 94 % | BODY MASS INDEX: 20.81 KG/M2 | TEMPERATURE: 97.9 F

## 2019-05-13 ASSESSMENT — MIFFLIN-ST. JEOR: SCORE: 907.31

## 2019-05-13 NOTE — PROGRESS NOTES
[place] [facility]  PRIMARY CARE PROVIDER AND CLINIC:  Andrea Nino MD, [facility] [address] [address] [facility] [address]  Chief Complaint   Patient presents with     Hospital F/U     [place] Medical Record Number:  [id number]  Place of Service where encounter took place:  [facility] [[id number]]    Minerva Blanco  is a 74 year old  (1945), admitted to the above facility from  [facility]. Hospital stay 4/22/2019 through 5/07/2019..  Admitted to this facility for  rehab, medical management and nursing care.    HPI:    HPI information obtained from: facility staff, patient report and [facility] chart review.   Brief Summary of Hospital Course:   - Pt with PMH recent T12-L1  compression fx presented to the above hospital with BP underwent extensive spinal fusion/laminectomy, corpectomy.   - hospitalization c/w thrombosed L brachial artery from central line s/p Arianna thrombectomy, acute post-op hemorrhage ICU admission and pressor,  required blood transfusion, started on iron supplement.; b/l pleural effusion, Echo with EF 65% and Normal LVF.     Updates on Status Since Skilled nursing Admission:   - reports aching pain and sharp, 8/10 in intensity, aggravated with movement, better with meds and rest.   - reports edema is improving but still weeping a little bit.   - reports started rehab program and making a progress.   - reports gets bloated with iron supplement    CODE STATUS/ADVANCE DIRECTIVES DISCUSSION:   CPR/Full code   Patient's living condition: lives alone  ALLERGIES: Amoxicillin; Ativan [lorazepam]; and Morphine  PAST MEDICAL HISTORY:  has a past medical history of Arrhythmia, Atrial fibrillation (H), Breast cancer (H) (2007), Chronic infection, Esophageal perforation, Fibromyalgia, GERD (gastroesophageal reflux disease), Hiatal hernia, History of blood transfusion, IBS (irritable bowel syndrome), Meniere's disease, MS (multiple sclerosis) (H), Multiple  sclerosis (H), Noninfectious ileitis, and Other chronic pain. She also has no past medical history of Anemia, Diabetes (H), or PONV (postoperative nausea and vomiting).  PAST SURGICAL HISTORY:   has a past surgical history that includes Esophagoscopy, gastroscopy, duodenoscopy (EGD), combined (N/A, 4/18/2016); Pharyngostomy (N/A, 4/18/2016); Esophagoscopy, gastroscopy, duodenoscopy (EGD), combined (N/A, 4/25/2016); Pharyngostomy (N/A, 4/25/2016); appendectomy; Thoracotomy (Right, 1998); back surgery (5/1/2015); Wrist surgery; Nissen fundoplication (1/2016); GASTROSTOMY/JEJUN TUBE; Esophagoscopy, gastroscopy, duodenoscopy (EGD), combined (N/A, 5/4/2016); Pharyngostomy (N/A, 5/4/2016); Esophagoscopy, gastroscopy, duodenoscopy (EGD), combined (N/A, 5/18/2016); Esophagoscopy, gastroscopy, duodenoscopy (EGD), combined (N/A, 6/22/2016); Pharyngostomy (N/A, 6/22/2016); Esophagoscopy, gastroscopy, duodenoscopy (EGD), dilatation, combined (N/A, 6/29/2016); Irrigation and debridement chest washout, combined (N/A, 6/29/2016); Pharyngostomy (N/A, 6/29/2016); UGI ENDOSCOPY W TRANSENDOSCOPIC STENT PLACEMENT (N/A, 7/12/2016); Esophagoscopy, gastroscopy, duodenoscopy (EGD), combined (N/A, 7/12/2016); UGI ENDOSCOPY W TRANSENDOSCOPIC STENT PLACEMENT (N/A, 7/22/2016); picc insertion (Left, 8/25/2016); Combined Esophagoscopy, Gastroscopy, Duodenoscopy (EGD), Remove Esophageal Stent (N/A, 8/26/2016); Nerve block peripheral (N/A, 8/30/2016); Lumpectomy breast (Right, 2007); Laparoscopy diagnostic (general) (N/A, 1/9/2017); Thoracotomy (Left, 1/9/2017); Esophagectomy (N/A, 1/9/2017); Create spit fistula (N/A, 1/9/2017); Laparoscopy diagnostic (general) (N/A, 4/17/2017); Close spit fistula (N/A, 4/17/2017); Laparoscopic assisted insertion tube jejunostomy (N/A, 4/17/2017); Bronchoscopy flexible (N/A, 4/17/2017); Esophagoscopy, gastroscopy, duodenoscopy (EGD), combined (N/A, 4/21/2017); Pharyngostomy (N/A, 4/21/2017); Esophagoscopy,  gastroscopy, duodenoscopy (EGD), combined (N/A, 5/19/2017); Explore neck (N/A, 5/19/2017); Irrigation and debridement chest washout, combined (N/A, 5/23/2017); Esophagoscopy, gastroscopy, duodenoscopy (EGD), combined (N/A, 5/23/2017); Irrigation and debridement chest washout, combined (N/A, 5/24/2017); Irrigation and debridement chest washout, combined (N/A, 5/25/2017); Irrigation and debridement chest washout, combined (N/A, 5/26/2017); Incision and drainage chest washout, combined (N/A, 5/27/2017); Incision and drainage chest washout, combined (N/A, 5/28/2017); Irrigation and debridement chest washout, combined (N/A, 5/30/2017); PICC INSERTION - Rewire (Right, 05/31/2017); Irrigation and debridement chest washout, combined (N/A, 5/31/2017); Pharyngostomy (Left, 5/31/2017); Irrigation and debridement chest washout, combined (N/A, 6/1/2017); Irrigation and debridement chest washout, combined (N/A, 6/4/2017); Irrigation and debridement chest washout, combined (N/A, 6/2/2017); Incision and drainage chest washout, combined (N/A, 6/9/2017); Esophagoscopy, gastroscopy, duodenoscopy (EGD), combined (N/A, 6/27/2017); Incision and drainage chest washout, combined (N/A, 7/17/2017); Esophagoscopy, gastroscopy, duodenoscopy (EGD), combined (N/A, 8/4/2017); Esophagoscopy, gastroscopy, duodenoscopy (EGD), combined (N/A, 8/25/2017); Combined Esophagoscopy, Gastroscopy, Duodenoscopy (EGD), Replace Esophageal Stent (N/A, 8/25/2017); Combined Esophagoscopy, Gastroscopy, Duodenoscopy (EGD), Replace Esophageal Stent (N/A, 9/15/2017); Repair fistula tracheoesophageal (N/A, 9/15/2017); Combined Esophagoscopy, Gastroscopy, Duodenoscopy (EGD), Replace Esophageal Stent (N/A, 10/20/2017); Esophagoscopy, gastroscopy, duodenoscopy (EGD), combined (N/A, 10/2/2017); Combined Esophagoscopy, Gastroscopy, Duodenoscopy (EGD), Replace Esophageal Stent (N/A, 11/3/2017); Combined Esophagoscopy, Gastroscopy, Duodenoscopy (EGD), Replace Esophageal Stent  (N/A, 11/17/2017); and Combined Esophagoscopy, Gastroscopy, Duodenoscopy (EGD), Remove Esophageal Stent (N/A, 2/12/2018).  FAMILY HISTORY: family history includes Alzheimer Disease in her mother; Breast Cancer in her daughter; Cerebrovascular Disease in her father; Ovarian Cancer in her sister.  SOCIAL HISTORY:   reports that she quit smoking about 21 years ago. Her smoking use included cigarettes. She has a 15.00 pack-year smoking history. She has never used smokeless tobacco. She reports that she does not drink alcohol or use drugs.    Post Discharge Medication Reconciliation Status: discharge medications reconciled and changed, per note/orders (see AVS)  Current Outpatient Medications   Medication Sig Dispense Refill     Acetaminophen (TYLENOL EXTRA STRENGTH PO) Take 1,000 mg by mouth 3 times daily And 500 mg by mouth 2 times daily as needed       aspirin (ASA) 325 MG EC tablet Take 325 mg by mouth daily       atorvastatin (LIPITOR) 80 MG tablet Take 80 mg by mouth daily        benzonatate (TESSALON) 100 MG capsule Take 100 mg by mouth 3 times daily as needed for cough       Calcium Carb-Cholecalciferol (OS-ANNABELLE PO) Take 1 tablet by mouth 3 times daily (with meals)       DULoxetine (CYMBALTA) 30 MG capsule Take 30 mg by mouth daily       ferrous sulfate (FEROSUL) 325 (65 Fe) MG tablet Take 325 mg by mouth daily (with breakfast)       gabapentin (NEURONTIN) 400 MG capsule Take 400 mg by mouth 2 times daily       HYDROmorphone (DILAUDID) 2 MG tablet Take 2 mg by mouth every 3 hours as needed for severe pain       lidocaine (LIDODERM) 5 % patch Apply to painful area topically 2 times a day - 12 hours on and 12 hours off       loperamide (IMODIUM A-D) 2 MG tablet Give 2-4 mg by mouth as needed       magnesium oxide 400 MG CAPS Take 400 mg by mouth 2 times daily       MELATONIN PO Take 10 mg by mouth At Bedtime        metoprolol succinate (TOPROL-XL) 50 MG 24 hr tablet Take 50 mg by mouth daily        multivitamin  w/minerals (THERA-VIT-M) tablet Take 1 tablet by mouth daily       omeprazole 20 MG tablet Take 1 tablet (20 mg) by mouth 2 times daily 180 tablet 3     polyethylene glycol (MIRALAX/GLYCOLAX) packet Take 1 packet by mouth daily as needed for constipation       ranitidine (ZANTAC) 75 MG tablet Take 150 mg by mouth 2 times daily       TRAZODONE HCL PO Take 100 mg by mouth nightly as needed for sleep       medical cannabis (Patient's own supply.  Not a prescription) 1 Units       ROS:  10 point ROS of systems including Constitutional, Eyes, Respiratory, Cardiovascular, Gastroenterology, Genitourinary, Integumentary, Musculoskeletal, Psychiatric were all negative except for pertinent positives noted in my HPI.    Vitals:  /51   Pulse 68   Temp 97.9  F (36.6  C)   Resp 21   Ht 1.524 m (5')   Wt 48.1 kg (106 lb)   SpO2 94%   BMI 20.70 kg/m    Exam:  GENERAL APPEARANCE:  in no distress, cooperative  ENT:  Mouth and posterior oropharynx normal, moist mucous membranes, oral mucosa moist, no lesion noted.   EYES:  EOMI, Pupil rounded and equal.  RESP:  lungs clear to auscultation   CV:  S1S2 audible, regular HR, no murmur appreciated.   ABDOMEN:  soft, NT/ND, BS audible. no mass appreciated on palpation.   M/S:   Brace in place. 4+ pitting edema in feet, extends to posteriori surfaces of b/l thighs ,and abdominal pain.   SKIN:  No rash. pale.   NEURO:   Feet drop. Hand  5/5 b/l  PSYCH:  normal insight, judgement and memory, affect and mood normal      Lab/Diagnostic data:  Labs done in SNF are in [place] EPIC. Please refer to them using [employer] Everywhere.    ASSESSMENT/PLAN:  #  Pt with T12 and L1 compression fx's and L1 kyphoplasty with progressive retropulsion, severe central canal stenosis with cord impingement and progressive neurologic decline s/p  -stage 1 posterior fusion T9 -pelvis with laminectomy T11-L1   - Hardware removal L5-W1 on 4/29/19  - stage 2 ASF T12-L2 w/Corpectomy at L1 (5/1)   #  Physical Deconditioning:  - - Physical function improving with OT/PT, continue.  - Analgesia optimal  - Continue DVT Prophylaxis according to Orthopedist's recommendations  - Follow on the surgeon's recommendations    Acute blood loss anemia:  - continue to monitor.     Generalized Peripheral Edema  B/L Pleural effusion  ? Lymphedema:  - Possibly from volume overload 2/2 to IVF hydration in setting of severe HoTN required pressor. Given lasix burst for 3 days, lymph wrapping in place, improving. Edema extends to abdominal wall.   - will start lasix 20 mg daily x 5 days, and KCL 10 meq. And will check BMP. -    Coronary artery disease involving native coronary artery of native heart without angina pectoris  HTN  - EF 65% at the hospital with no WMA and normal LVF.     Brachial artery thrombosis (H)  Continue with aspirin 325 mg    Adjustment Disorder  Insomnia, unspecified type  - improving.     Orders:  transcribed by : Camille Muse  Orders:  1. Lasix 20 mg BID x 5 days - Dx: edema  2. KCL 20 meq daily x 5 days  3. BMP on next lab day  - See above, otherwise, continue the rest of the current POC.     Electronically signed by:  Jacquelyn Martinez MD

## 2019-05-14 ENCOUNTER — NURSING HOME VISIT (OUTPATIENT)
Dept: GERIATRICS | Facility: CLINIC | Age: 74
End: 2019-05-14

## 2019-05-14 DIAGNOSIS — D62 POSTOPERATIVE ANEMIA DUE TO ACUTE BLOOD LOSS: ICD-10-CM

## 2019-05-14 DIAGNOSIS — M48.062 SPINAL STENOSIS OF LUMBAR REGION WITH NEUROGENIC CLAUDICATION: ICD-10-CM

## 2019-05-14 DIAGNOSIS — R53.81 PHYSICAL DECONDITIONING: ICD-10-CM

## 2019-05-14 DIAGNOSIS — I89.0 LYMPHEDEMA: ICD-10-CM

## 2019-05-14 DIAGNOSIS — Z98.890 S/P LUMBAR LAMINECTOMY: ICD-10-CM

## 2019-05-14 DIAGNOSIS — I25.10 CORONARY ARTERY DISEASE INVOLVING NATIVE CORONARY ARTERY OF NATIVE HEART WITHOUT ANGINA PECTORIS: ICD-10-CM

## 2019-05-14 DIAGNOSIS — S32.010D CLOSED COMPRESSION FRACTURE OF L1 LUMBAR VERTEBRA WITH ROUTINE HEALING, SUBSEQUENT ENCOUNTER: Primary | ICD-10-CM

## 2019-05-14 PROCEDURE — 99305 1ST NF CARE MODERATE MDM 35: CPT | Performed by: FAMILY MEDICINE

## 2019-05-14 RX ORDER — POTASSIUM CHLORIDE 1.5 G/1.58G
20 POWDER, FOR SOLUTION ORAL DAILY
COMMUNITY
Start: 2019-05-14 | End: 2019-05-20

## 2019-05-14 RX ORDER — FUROSEMIDE 20 MG
40 TABLET ORAL 2 TIMES DAILY
Status: ON HOLD | COMMUNITY
Start: 2019-05-22 | End: 2020-01-01

## 2019-05-14 NOTE — LETTER
5/14/2019        RE: Minerva Blanco  [address]  [place] [address]        [place] [facility]  PRIMARY CARE PROVIDER AND CLINIC:  Andrea Nino MD, [facility] [address] [facility] [address] / [facility] [address]  Chief Complaint   Patient presents with     Hospital F/U     [place] Medical Record Number:  [id number]  Place of Service where encounter took place:  [facility] [[id number]]    Minerva Blanco  is a 74 year old  (1945), admitted to the above facility from  [facility]. Hospital stay 4/22/2019 through 5/07/2019..  Admitted to this facility for  rehab, medical management and nursing care.    HPI:    HPI information obtained from: facility staff, patient report and [facility] chart review.   Brief Summary of Hospital Course:   - Pt with PMH recent T12-L1  compression fx presented to the above hospital with BP underwent extensive spinal fusion/laminectomy, corpectomy.   - hospitalization c/w thrombosed L brachial artery from central line s/p Arianna thrombectomy, acute post-op hemorrhage ICU admission and pressor,  required blood transfusion, started on iron supplement.; b/l pleural effusion, Echo with EF 65% and Normal LVF.     Updates on Status Since Skilled nursing Admission:   - reports aching pain and sharp, 8/10 in intensity, aggravated with movement, better with meds and rest.   - reports edema is improving but still weeping a little bit.   - reports started rehab program and making a progress.   - reports gets bloated with iron supplement    CODE STATUS/ADVANCE DIRECTIVES DISCUSSION:   CPR/Full code   Patient's living condition: lives alone  ALLERGIES: Amoxicillin; Ativan [lorazepam]; and Morphine  PAST MEDICAL HISTORY:  has a past medical history of Arrhythmia, Atrial fibrillation (H), Breast cancer (H) (2007), Chronic infection, Esophageal perforation, Fibromyalgia, GERD (gastroesophageal reflux disease), Hiatal hernia, History of blood  transfusion, IBS (irritable bowel syndrome), Meniere's disease, MS (multiple sclerosis) (H), Multiple sclerosis (H), Noninfectious ileitis, and Other chronic pain. She also has no past medical history of Anemia, Diabetes (H), or PONV (postoperative nausea and vomiting).  PAST SURGICAL HISTORY:   has a past surgical history that includes Esophagoscopy, gastroscopy, duodenoscopy (EGD), combined (N/A, 4/18/2016); Pharyngostomy (N/A, 4/18/2016); Esophagoscopy, gastroscopy, duodenoscopy (EGD), combined (N/A, 4/25/2016); Pharyngostomy (N/A, 4/25/2016); appendectomy; Thoracotomy (Right, 1998); back surgery (5/1/2015); Wrist surgery; Nissen fundoplication (1/2016); GASTROSTOMY/JEJUN TUBE; Esophagoscopy, gastroscopy, duodenoscopy (EGD), combined (N/A, 5/4/2016); Pharyngostomy (N/A, 5/4/2016); Esophagoscopy, gastroscopy, duodenoscopy (EGD), combined (N/A, 5/18/2016); Esophagoscopy, gastroscopy, duodenoscopy (EGD), combined (N/A, 6/22/2016); Pharyngostomy (N/A, 6/22/2016); Esophagoscopy, gastroscopy, duodenoscopy (EGD), dilatation, combined (N/A, 6/29/2016); Irrigation and debridement chest washout, combined (N/A, 6/29/2016); Pharyngostomy (N/A, 6/29/2016); UGI ENDOSCOPY W TRANSENDOSCOPIC STENT PLACEMENT (N/A, 7/12/2016); Esophagoscopy, gastroscopy, duodenoscopy (EGD), combined (N/A, 7/12/2016); UGI ENDOSCOPY W TRANSENDOSCOPIC STENT PLACEMENT (N/A, 7/22/2016); picc insertion (Left, 8/25/2016); Combined Esophagoscopy, Gastroscopy, Duodenoscopy (EGD), Remove Esophageal Stent (N/A, 8/26/2016); Nerve block peripheral (N/A, 8/30/2016); Lumpectomy breast (Right, 2007); Laparoscopy diagnostic (general) (N/A, 1/9/2017); Thoracotomy (Left, 1/9/2017); Esophagectomy (N/A, 1/9/2017); Create spit fistula (N/A, 1/9/2017); Laparoscopy diagnostic (general) (N/A, 4/17/2017); Close spit fistula (N/A, 4/17/2017); Laparoscopic assisted insertion tube jejunostomy (N/A, 4/17/2017); Bronchoscopy flexible (N/A, 4/17/2017); Esophagoscopy, gastroscopy,  duodenoscopy (EGD), combined (N/A, 4/21/2017); Pharyngostomy (N/A, 4/21/2017); Esophagoscopy, gastroscopy, duodenoscopy (EGD), combined (N/A, 5/19/2017); Explore neck (N/A, 5/19/2017); Irrigation and debridement chest washout, combined (N/A, 5/23/2017); Esophagoscopy, gastroscopy, duodenoscopy (EGD), combined (N/A, 5/23/2017); Irrigation and debridement chest washout, combined (N/A, 5/24/2017); Irrigation and debridement chest washout, combined (N/A, 5/25/2017); Irrigation and debridement chest washout, combined (N/A, 5/26/2017); Incision and drainage chest washout, combined (N/A, 5/27/2017); Incision and drainage chest washout, combined (N/A, 5/28/2017); Irrigation and debridement chest washout, combined (N/A, 5/30/2017); PICC INSERTION - Rewire (Right, 05/31/2017); Irrigation and debridement chest washout, combined (N/A, 5/31/2017); Pharyngostomy (Left, 5/31/2017); Irrigation and debridement chest washout, combined (N/A, 6/1/2017); Irrigation and debridement chest washout, combined (N/A, 6/4/2017); Irrigation and debridement chest washout, combined (N/A, 6/2/2017); Incision and drainage chest washout, combined (N/A, 6/9/2017); Esophagoscopy, gastroscopy, duodenoscopy (EGD), combined (N/A, 6/27/2017); Incision and drainage chest washout, combined (N/A, 7/17/2017); Esophagoscopy, gastroscopy, duodenoscopy (EGD), combined (N/A, 8/4/2017); Esophagoscopy, gastroscopy, duodenoscopy (EGD), combined (N/A, 8/25/2017); Combined Esophagoscopy, Gastroscopy, Duodenoscopy (EGD), Replace Esophageal Stent (N/A, 8/25/2017); Combined Esophagoscopy, Gastroscopy, Duodenoscopy (EGD), Replace Esophageal Stent (N/A, 9/15/2017); Repair fistula tracheoesophageal (N/A, 9/15/2017); Combined Esophagoscopy, Gastroscopy, Duodenoscopy (EGD), Replace Esophageal Stent (N/A, 10/20/2017); Esophagoscopy, gastroscopy, duodenoscopy (EGD), combined (N/A, 10/2/2017); Combined Esophagoscopy, Gastroscopy, Duodenoscopy (EGD), Replace Esophageal Stent (N/A,  11/3/2017); Combined Esophagoscopy, Gastroscopy, Duodenoscopy (EGD), Replace Esophageal Stent (N/A, 11/17/2017); and Combined Esophagoscopy, Gastroscopy, Duodenoscopy (EGD), Remove Esophageal Stent (N/A, 2/12/2018).  FAMILY HISTORY: family history includes Alzheimer Disease in her mother; Breast Cancer in her daughter; Cerebrovascular Disease in her father; Ovarian Cancer in her sister.  SOCIAL HISTORY:   reports that she quit smoking about 21 years ago. Her smoking use included cigarettes. She has a 15.00 pack-year smoking history. She has never used smokeless tobacco. She reports that she does not drink alcohol or use drugs.    Post Discharge Medication Reconciliation Status: discharge medications reconciled and changed, per note/orders (see AVS)  Current Outpatient Medications   Medication Sig Dispense Refill     Acetaminophen (TYLENOL EXTRA STRENGTH PO) Take 1,000 mg by mouth 3 times daily And 500 mg by mouth 2 times daily as needed       aspirin (ASA) 325 MG EC tablet Take 325 mg by mouth daily       atorvastatin (LIPITOR) 80 MG tablet Take 80 mg by mouth daily        benzonatate (TESSALON) 100 MG capsule Take 100 mg by mouth 3 times daily as needed for cough       Calcium Carb-Cholecalciferol (OS-ANNABELLE PO) Take 1 tablet by mouth 3 times daily (with meals)       DULoxetine (CYMBALTA) 30 MG capsule Take 30 mg by mouth daily       ferrous sulfate (FEROSUL) 325 (65 Fe) MG tablet Take 325 mg by mouth daily (with breakfast)       gabapentin (NEURONTIN) 400 MG capsule Take 400 mg by mouth 2 times daily       HYDROmorphone (DILAUDID) 2 MG tablet Take 2 mg by mouth every 3 hours as needed for severe pain       lidocaine (LIDODERM) 5 % patch Apply to painful area topically 2 times a day - 12 hours on and 12 hours off       loperamide (IMODIUM A-D) 2 MG tablet Give 2-4 mg by mouth as needed       magnesium oxide 400 MG CAPS Take 400 mg by mouth 2 times daily       MELATONIN PO Take 10 mg by mouth At Bedtime         metoprolol succinate (TOPROL-XL) 50 MG 24 hr tablet Take 50 mg by mouth daily        multivitamin w/minerals (THERA-VIT-M) tablet Take 1 tablet by mouth daily       omeprazole 20 MG tablet Take 1 tablet (20 mg) by mouth 2 times daily 180 tablet 3     polyethylene glycol (MIRALAX/GLYCOLAX) packet Take 1 packet by mouth daily as needed for constipation       ranitidine (ZANTAC) 75 MG tablet Take 150 mg by mouth 2 times daily       TRAZODONE HCL PO Take 100 mg by mouth nightly as needed for sleep       medical cannabis (Patient's own supply.  Not a prescription) 1 Units       ROS:  10 point ROS of systems including Constitutional, Eyes, Respiratory, Cardiovascular, Gastroenterology, Genitourinary, Integumentary, Musculoskeletal, Psychiatric were all negative except for pertinent positives noted in my HPI.    Vitals:  /51   Pulse 68   Temp 97.9  F (36.6  C)   Resp 21   Ht 1.524 m (5')   Wt 48.1 kg (106 lb)   SpO2 94%   BMI 20.70 kg/m     Exam:  GENERAL APPEARANCE:  in no distress, cooperative  ENT:  Mouth and posterior oropharynx normal, moist mucous membranes, oral mucosa moist, no lesion noted.   EYES:  EOMI, Pupil rounded and equal.  RESP:  lungs clear to auscultation   CV:  S1S2 audible, regular HR, no murmur appreciated.   ABDOMEN:  soft, NT/ND, BS audible. no mass appreciated on palpation.   M/S:   Brace in place. 4+ pitting edema in feet, extends to posteriori surfaces of b/l thighs ,and abdominal pain.   SKIN:  No rash. pale.   NEURO:   Feet drop. Hand  5/5 b/l  PSYCH:  normal insight, judgement and memory, affect and mood normal      Lab/Diagnostic data:  Labs done in SNF are in [place] EPIC. Please refer to them using [employer] Everywhere.    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  - Analgesia optimal  - Continue DVT Prophylaxis according to Orthopedist's recommendations  - Follow on the surgeon's recommendations    Acute blood loss anemia:  - continue to monitor.     Generalized Peripheral Edema  B/L Pleural effusion  ? Lymphedema:  - Possibly from volume overload 2/2 to IVF hydration in setting of severe HoTN required pressor. Given lasix burst for 3 days, lymph wrapping in place, improving. Edema extends to abdominal wall.   - will start lasix 20 mg daily x 5 days, and KCL 10 meq. And will check BMP. -    Coronary artery disease involving native coronary artery of native heart without angina pectoris  HTN  - EF 65% at the hospital with no WMA and normal LVF.     Brachial artery thrombosis (H)  Continue with aspirin 325 mg    Adjustment Disorder  Insomnia, unspecified type  - improving.     Orders:  transcribed by : Camille Muse  Orders:  1. Lasix 20 mg BID x 5 days - Dx: edema  2. KCL 20 meq daily x 5 days  3. BMP on next lab day  - See above, otherwise, continue the rest of the current POC.     Electronically signed by:  Jacquelyn Mratinez MD                       Sincerely,        Jacquelyn Martinez MD

## 2019-05-18 ASSESSMENT — MIFFLIN-ST. JEOR: SCORE: 900.96

## 2019-05-18 NOTE — PROGRESS NOTES
[facility]  [place] Record Number:  [id number]  Place of Service where encounter took place:  [facility] ([facility]) [[id number]]  Chief Complaint   Patient presents with     RECHECK       HPI:    Minerva Blanco  is a 74 year old (1945), who is being seen today for an episodic care visit.  HPI information obtained from: facility chart records, facility staff and patient report. Today's concern is:     Closed compression fracture of L1 lumbar vertebra with routine healing, subsequent encounter  S/P lumbar laminectomy  Lymphedema  Brachial artery thrombosis (H)  Postoperative anemia due to acute blood loss  Essential hypertension  Drug-induced constipation  Moderate protein-calorie malnutrition (H)  MS (multiple sclerosis) (H)   Patient indicates that she feels she is getting stronger but not as quickly she would like to.  Did have issues with constipation over the weekend.  Required digital removal  Pain improved but remains  According to therapy patient self limits during therapy sessions  Completed 5-day burst of Lasix  Denies any chest pain, shortness of breath, lightheadedness, dizziness  Has required trazodone every night.  States she forgot to ask one night and did not sleep well patient has been using trazodone    Past Medical and Surgical History reviewed in Epic today.    MEDICATIONS:  Current Outpatient Medications   Medication Sig Dispense Refill     Acetaminophen (TYLENOL EXTRA STRENGTH PO) Take 1,000 mg by mouth 3 times daily And 500 mg by mouth 2 times daily as needed       aspirin (ASA) 325 MG EC tablet Take 325 mg by mouth daily       atorvastatin (LIPITOR) 80 MG tablet Take 80 mg by mouth daily        benzonatate (TESSALON) 100 MG capsule Take 100 mg by mouth 3 times daily as needed for cough       bisacodyl (DULCOLAX) 10 MG suppository Place 10 mg rectally daily as needed for constipation Insert 10 mg rectally every 24 hours as needed for For constipation until  05/20/2019 23:59 Per JONATHAN       Calcium Carb-Cholecalciferol (OS-ANNABELLE PO) Take 1 tablet by mouth 3 times daily (with meals)       DULoxetine (CYMBALTA) 30 MG capsule Take 30 mg by mouth daily       ferrous sulfate (FEROSUL) 325 (65 Fe) MG tablet Take 325 mg by mouth daily (with breakfast)       furosemide (LASIX) 20 MG tablet Take 20 mg by mouth 2 times daily       gabapentin (NEURONTIN) 400 MG capsule Take 400 mg by mouth 2 times daily       HYDROmorphone (DILAUDID) 2 MG tablet Take 2 mg by mouth 4 times daily as needed for severe pain        lidocaine (LIDODERM) 5 % patch Apply to painful area topically 2 times a day - 12 hours on and 12 hours off       loperamide (IMODIUM A-D) 2 MG tablet Give 4 mg by mouth as needed for Loose stools Give 4mg with first loose stool AND Give 2 mg by mouth as needed for Loose Stools 2 mg with each subsequent loose stool episode after initial 4 mg first dose. NOT TO EXCEED 16 MG IN 24       magnesium oxide 400 MG CAPS Take 400 mg by mouth 2 times daily       MELATONIN PO Take 10 mg by mouth At Bedtime        metoprolol succinate (TOPROL-XL) 50 MG 24 hr tablet Take 50 mg by mouth daily        multivitamin w/minerals (THERA-VIT-M) tablet Take 1 tablet by mouth daily       Nutritional Supplements (NUTRITIONAL SUPPLEMENT PO) Take by mouth At Bedtime Snack at  hs.       omeprazole 20 MG tablet Take 1 tablet (20 mg) by mouth 2 times daily 180 tablet 3     OTHER MEDICAL SUPPLIES every morning Daily weights.       OTHER MEDICAL SUPPLIES 4 times daily BP checks qid.       OTHER MEDICAL SUPPLIES Legs: Black socks and Compriflex LEs. Thigh high wraps. Leave on at all times. If soiled may remove thigh portions.    every shift       polyethylene glycol (MIRALAX/GLYCOLAX) packet Take 1 packet by mouth daily as needed for constipation       potassium chloride ER (K-DUR/KLOR-CON M) 20 MEQ CR tablet Take 20 mEq by mouth daily        ranitidine (ZANTAC) 75 MG tablet Take 75 mg by mouth 2 times daily         senna-docusate (SENOKOT-S/PERICOLACE) 8.6-50 MG tablet Take 1 tablet by mouth 2 times daily       TRAZODONE HCL PO Take 100 mg by mouth At Bedtime        medical cannabis (Patient's own supply.  Not a prescription) 1 Units Patient report she takes at home.  Not taking at TCU         REVIEW OF SYSTEMS:  4 point ROS including Respiratory, CV, GI and , other than that noted in the HPI,  is negative    Objective:  /64   Pulse 103   Temp 97.5  F (36.4  C)   Resp 16   Ht 1.524 m (5')   Wt 47.4 kg (104 lb 9.6 oz)   SpO2 93%   BMI 20.43 kg/m    Exam:  GENERAL APPEARANCE:  Alert, in no distress, thin  RESP:  lungs clear to auscultation , no respiratory distress  CV:  Palpation and auscultation of heart done , Peripheral edema greatly improved.  No longer wearing large lymphedema wraps.  2-3+ pitting bilateral lower extremities wrapped with compression garments.  Trace bilateral upper extremity edema.  No longer needing to wear garments  M/S:   Generalized weakness, able to ambulate with standby assist and use of walker  SKIN: Edema greatly reduced,  Surgical wound somewhat concerning.  Upper 2 inches appears to be erythematous, edematous, and draining dark-colored drainage.  Held together with staples and sutures.  Bottom of incision also draining.  Middle clean, dry, intact.  See photo                  Labs:   Recent labs in EPIC reviewed by me today.    It appears as if labs were not done last week.  Unavailable in epic or chart    ASSESSMENT/PLAN:  Closed compression fracture of L1 lumbar vertebra with routine healing, subsequent encounter  S/P lumbar laminectomy  Continues prostate in therapy, however is self-limiting.  Questions postop wound complications.  Will have nursing staff check wound twice daily, cleanse and dress.  Will notify orthopedics  Continues to wear TLSO.  On aspirin 325 mg daily for DVT prophylaxis.  Is requiring 5 doses per day of Dilaudid.  Discussed weaning of narcotics.  She is  in agreement.  Will limit Dilaudid to 4 times daily.  Encourage patient to continue to wean.      Lymphedema  Lymphedema therapy remains closely involved.  Lymphedema greatly reduced.  Now that Lasix burst completes, will continue to monitor for increased edema    Brachial artery thrombosis (H)  Stable.  No changes.  Continue with aspirin 325 mg    Postoperative anemia due to acute blood loss  Generalized weakness.  Hemoglobin was not done last week.  Will order for this week    Essential hypertension  Continues on metoprolol.  Blood pressures continue on the soft side.  Asymptomatic.  Continue to monitor.    05/20/2019 09:36 103/62 mmHg (Sitting r/arm)  05/20/2019 01:27 91/61 mmHg  05/19/2019 21:51 80/56 mmHg  05/19/2019 21:30 80/56 mmHg (Sitting l/arm)  05/19/2019 15:47 97/76 mmHg (Lying r/arm)  05/19/2019 12:02 110/62 mmHg (Sitting r/arm)  05/19/2019 07:49 120/64 mmHg (Lying l/arm)  05/18/2019 20:16 102/62 mmHg (Sitting r/arm    Drug-induced constipation  Constipation requiring MiraLAX, suppository, and digital removal.  Imodium discontinued, on senna, however stools are now softening again.  We will continue to monitor.  Constipation should ease with decrease narcotics as well    Moderate protein-calorie malnutrition (H)  Weight down from 1 12-1 03.  However suspect this is primarily fluid.  BMI 20.43  Continue with supplements and educated in good nutrition    05/20/2019 09:56 103.2 Lbs (Wheelchair)  05/19/2019 14:29 105 Lbs (Wheelchair)  05/16/2019 09:55 104.4 Lbs (Wheelchair)  05/15/2019 10:24 105.4 Lbs (Wheelchair)  05/07/2019, 112.8 lbs, -6%, -6.8 lbs]  05/13/2019 09:33 106 Lbs (Wheelchair)  05/07/2019, 112.8 lbs, -5.5%, -6.2 lbs]  05/12/2019 10:34 106.6 Lbs (Wheelchair)  05/11/2019 11:30 106 Lbs (Wheelchair)  05/10/2019 09:42 107.6 Lbs (Wheelchair)  05/08/2019 09:49 110.6 Lbs (Wheelchair)  05/07/2019 21:27 112.8 Lbs (Wheelchair)    MS (multiple sclerosis) (H)  Chronic, stable, can factor into speed of  rehab.    Adjustment Disorder  Insomnia  Patient has required trazodone in order to sleep every night.  It is currently as needed.  Will schedule which is what she is accustomed to doing at home as well      transcribed by : Camille Muse  Orders:  1. Discontinue PRN Trazodone  2. Schedule Trazodone 100 mg PO at bedtime - sleep  3. Discontinue current hydromorphone  4. Hydromorphone 2 mg PO QID PRN - Pain  5. Next lab day: CBC (anemia), BMP (decrease K+), and magnesium (decrease g)  6. Dressing change to back surgical wounds BID - cover with ABD. Report any increased drainage, erythema, edema to NP  7. Please call Ortho - re: suture removal - wait for Ortho appt or ok to remove at TCU        Face to Face and Medical Necessity Statement for DME Provider visit    Demographic Information on Minerva Blanco:  Gender: female  : 1945  [address]   [place] [address]  [phone] (home)     Medical Record: [id number]  Social Security Number: [id number]  Primary Care Provider: Andrea Nino  Insurance: Payor: MEDICARE / Plan: MEDICARE FOR HB SUPPLEMENT / Product Type: Medicare /     HPI:   Minerva Blanco is a 74 year old  (1945), who is being seen today for a face to face provider visit at [facility]; medical necessity statement for DME included. This patient requires the following:  DME Ordered and Medical Necessity Statement   Wheelchair  Mechanical Wheelchair  Size: 18 x 16  Corresponding cushion: Yes:   Elevating leg rests: Yes  Lap tray: No  Dose patient use oxygen? No   Able to propel w/c? Yes   Mobility related ADL that are affected in the home:  ambulation  The wheelchair is suitable and necessary for use in the patient's home.  Reason why a cane or walker will not meet the patient's needs. (ie: balance, tolerance, level of assistance) tolerance, severe lymphedema, weakness, neuropathy  The patient has expressed willingness to use the wheelchair in the home  and does have the physical and cognitive ability to maneuver the equipment or has a caregiver who is available, willing, and able to provide assistance in the home with the wheelchair.   Patients functional mobility deficit can be sufficiently resolved by the use of the above wheelchair      Pt needing above DME with expected length of need of 99   years  due to medical necessity associated with following diagnosis:     Closed compression fracture of L1 lumbar vertebra with routine healing, subsequent encounter  S/P lumbar laminectomy  Lymphedema  Brachial artery thrombosis (H)  Postoperative anemia due to acute blood loss  Essential hypertension  Drug-induced constipation  Moderate protein-calorie malnutrition (H)  MS (multiple sclerosis) (H)  Adjustment disorder with anxious mood  Insomnia, unspecified type      PMH   has a past medical history of Arrhythmia, Atrial fibrillation (H), Breast cancer (H) (2007), Chronic infection, Esophageal perforation, Fibromyalgia, GERD (gastroesophageal reflux disease), Hiatal hernia, History of blood transfusion, IBS (irritable bowel syndrome), Meniere's disease, MS (multiple sclerosis) (H), Multiple sclerosis (H), Noninfectious ileitis, and Other chronic pain. She also has no past medical history of Anemia, Diabetes (H), or PONV (postoperative nausea and vomiting).    ROS:see above    EXAM  Vitals: /64   Pulse 103   Temp 97.5  F (36.4  C)   Resp 16   Ht 1.524 m (5')   Wt 47.4 kg (104 lb 9.6 oz)   SpO2 93%   BMI 20.43 kg/m  ;BMI= Body mass index is 20.43 kg/m .  See above     ASSESSMENT/PLAN:  1. Closed compression fracture of L1 lumbar vertebra with routine healing, subsequent encounter    2. S/P lumbar laminectomy    3. Lymphedema    4. Brachial artery thrombosis (H)    5. Postoperative anemia due to acute blood loss    6. Essential hypertension    7. Drug-induced constipation    8. Moderate protein-calorie malnutrition (H)    9. MS (multiple sclerosis) (H)    10.  Adjustment disorder with anxious mood    11. Insomnia, unspecified type        Orders:  1. Facility staff/TC to contact [employer] to get their order form for provider to fill out    ELECTRONICALLY SIGNED BY YINKA CERTIFIED PROVIDER:  CLIVE Mcguire CNP   NPI: [id number]  [facility]  [address] [place] [address], [address]  [place] [address]        Electronically signed by:  CLIVE Mcguire CNP

## 2019-05-19 VITALS
OXYGEN SATURATION: 93 % | HEART RATE: 103 BPM | SYSTOLIC BLOOD PRESSURE: 120 MMHG | BODY MASS INDEX: 20.54 KG/M2 | RESPIRATION RATE: 16 BRPM | WEIGHT: 104.6 LBS | HEIGHT: 60 IN | TEMPERATURE: 97.5 F | DIASTOLIC BLOOD PRESSURE: 64 MMHG

## 2019-05-19 RX ORDER — AMOXICILLIN 250 MG
1 CAPSULE ORAL 2 TIMES DAILY
Status: ON HOLD | COMMUNITY
End: 2020-01-01

## 2019-05-19 RX ORDER — BISACODYL 10 MG
10 SUPPOSITORY, RECTAL RECTAL DAILY PRN
COMMUNITY
Start: 2019-05-17 | End: 2019-06-05

## 2019-05-20 ENCOUNTER — NURSING HOME VISIT (OUTPATIENT)
Dept: GERIATRICS | Facility: CLINIC | Age: 74
End: 2019-05-20

## 2019-05-20 DIAGNOSIS — G35 MS (MULTIPLE SCLEROSIS) (H): ICD-10-CM

## 2019-05-20 DIAGNOSIS — I10 ESSENTIAL HYPERTENSION: ICD-10-CM

## 2019-05-20 DIAGNOSIS — I89.0 LYMPHEDEMA: ICD-10-CM

## 2019-05-20 DIAGNOSIS — Z98.890 S/P LUMBAR LAMINECTOMY: ICD-10-CM

## 2019-05-20 DIAGNOSIS — I74.2 BRACHIAL ARTERY THROMBOSIS (H): ICD-10-CM

## 2019-05-20 DIAGNOSIS — E44.0 MODERATE PROTEIN-CALORIE MALNUTRITION (H): ICD-10-CM

## 2019-05-20 DIAGNOSIS — D62 POSTOPERATIVE ANEMIA DUE TO ACUTE BLOOD LOSS: ICD-10-CM

## 2019-05-20 DIAGNOSIS — K59.03 DRUG-INDUCED CONSTIPATION: ICD-10-CM

## 2019-05-20 DIAGNOSIS — S32.010D CLOSED COMPRESSION FRACTURE OF L1 LUMBAR VERTEBRA WITH ROUTINE HEALING, SUBSEQUENT ENCOUNTER: Primary | ICD-10-CM

## 2019-05-20 DIAGNOSIS — F43.22 ADJUSTMENT DISORDER WITH ANXIOUS MOOD: ICD-10-CM

## 2019-05-20 DIAGNOSIS — G47.00 INSOMNIA, UNSPECIFIED TYPE: ICD-10-CM

## 2019-05-20 PROCEDURE — 99309 SBSQ NF CARE MODERATE MDM 30: CPT | Performed by: NURSE PRACTITIONER

## 2019-05-20 NOTE — LETTER
5/20/2019        RE: Minerva Blanco  [address]  [place] [address]        [facility]  [place] Medical Record Number:  [id number]  Place of Service where encounter took place:  [facility] ([facility]) [[id number]]  Chief Complaint   Patient presents with     RECHECK       HPI:    Minerva Blanco  is a 74 year old (1945), who is being seen today for an episodic care visit.  HPI information obtained from: facility chart records, facility staff and patient report. Today's concern is:     Closed compression fracture of L1 lumbar vertebra with routine healing, subsequent encounter  S/P lumbar laminectomy  Lymphedema  Brachial artery thrombosis (H)  Postoperative anemia due to acute blood loss  Essential hypertension  Drug-induced constipation  Moderate protein-calorie malnutrition (H)  MS (multiple sclerosis) (H)   Patient indicates that she feels she is getting stronger but not as quickly she would like to.  Did have issues with constipation over the weekend.  Required digital removal  Pain improved but remains  According to therapy patient self limits during therapy sessions  Completed 5-day burst of Lasix  Denies any chest pain, shortness of breath, lightheadedness, dizziness  Has required trazodone every night.  States she forgot to ask one night and did not sleep well patient has been using trazodone    Past Medical and Surgical History reviewed in Epic today.    MEDICATIONS:  Current Outpatient Medications   Medication Sig Dispense Refill     Acetaminophen (TYLENOL EXTRA STRENGTH PO) Take 1,000 mg by mouth 3 times daily And 500 mg by mouth 2 times daily as needed       aspirin (ASA) 325 MG EC tablet Take 325 mg by mouth daily       atorvastatin (LIPITOR) 80 MG tablet Take 80 mg by mouth daily        benzonatate (TESSALON) 100 MG capsule Take 100 mg by mouth 3 times daily as needed for cough       bisacodyl (DULCOLAX) 10 MG suppository Place 10 mg rectally daily  as needed for constipation Insert 10 mg rectally every 24 hours as needed for For constipation until 05/20/2019 23:59 Per JONATHAN       Calcium Carb-Cholecalciferol (OS-ANNABELLE PO) Take 1 tablet by mouth 3 times daily (with meals)       DULoxetine (CYMBALTA) 30 MG capsule Take 30 mg by mouth daily       ferrous sulfate (FEROSUL) 325 (65 Fe) MG tablet Take 325 mg by mouth daily (with breakfast)       furosemide (LASIX) 20 MG tablet Take 20 mg by mouth 2 times daily       gabapentin (NEURONTIN) 400 MG capsule Take 400 mg by mouth 2 times daily       HYDROmorphone (DILAUDID) 2 MG tablet Take 2 mg by mouth 4 times daily as needed for severe pain        lidocaine (LIDODERM) 5 % patch Apply to painful area topically 2 times a day - 12 hours on and 12 hours off       loperamide (IMODIUM A-D) 2 MG tablet Give 4 mg by mouth as needed for Loose stools Give 4mg with first loose stool AND Give 2 mg by mouth as needed for Loose Stools 2 mg with each subsequent loose stool episode after initial 4 mg first dose. NOT TO EXCEED 16 MG IN 24       magnesium oxide 400 MG CAPS Take 400 mg by mouth 2 times daily       MELATONIN PO Take 10 mg by mouth At Bedtime        metoprolol succinate (TOPROL-XL) 50 MG 24 hr tablet Take 50 mg by mouth daily        multivitamin w/minerals (THERA-VIT-M) tablet Take 1 tablet by mouth daily       Nutritional Supplements (NUTRITIONAL SUPPLEMENT PO) Take by mouth At Bedtime Snack at  hs.       omeprazole 20 MG tablet Take 1 tablet (20 mg) by mouth 2 times daily 180 tablet 3     OTHER MEDICAL SUPPLIES every morning Daily weights.       OTHER MEDICAL SUPPLIES 4 times daily BP checks qid.       OTHER MEDICAL SUPPLIES Legs: Black socks and Compriflex LEs. Thigh high wraps. Leave on at all times. If soiled may remove thigh portions.    every shift       polyethylene glycol (MIRALAX/GLYCOLAX) packet Take 1 packet by mouth daily as needed for constipation       potassium chloride ER (K-DUR/KLOR-CON M) 20 MEQ CR tablet  Take 20 mEq by mouth daily        ranitidine (ZANTAC) 75 MG tablet Take 75 mg by mouth 2 times daily        senna-docusate (SENOKOT-S/PERICOLACE) 8.6-50 MG tablet Take 1 tablet by mouth 2 times daily       TRAZODONE HCL PO Take 100 mg by mouth At Bedtime        medical cannabis (Patient's own supply.  Not a prescription) 1 Units Patient report she takes at home.  Not taking at TCU         REVIEW OF SYSTEMS:  4 point ROS including Respiratory, CV, GI and , other than that noted in the HPI,  is negative    Objective:  /64   Pulse 103   Temp 97.5  F (36.4  C)   Resp 16   Ht 1.524 m (5')   Wt 47.4 kg (104 lb 9.6 oz)   SpO2 93%   BMI 20.43 kg/m     Exam:  GENERAL APPEARANCE:  Alert, in no distress, thin  RESP:  lungs clear to auscultation , no respiratory distress  CV:  Palpation and auscultation of heart done , Peripheral edema greatly improved.  No longer wearing large lymphedema wraps.  2-3+ pitting bilateral lower extremities wrapped with compression garments.  Trace bilateral upper extremity edema.  No longer needing to wear garments  M/S:   Generalized weakness, able to ambulate with standby assist and use of walker  SKIN: Edema greatly reduced,  Surgical wound somewhat concerning.  Upper 2 inches appears to be erythematous, edematous, and draining dark-colored drainage.  Held together with staples and sutures.  Bottom of incision also draining.  Middle clean, dry, intact.  See photo                  Labs:   Recent labs in EPIC reviewed by me today.    It appears as if labs were not done last week.  Unavailable in epic or chart    ASSESSMENT/PLAN:  Closed compression fracture of L1 lumbar vertebra with routine healing, subsequent encounter  S/P lumbar laminectomy  Continues prostate in therapy, however is self-limiting.  Questions postop wound complications.  Will have nursing staff check wound twice daily, cleanse and dress.  Will notify orthopedics  Continues to wear TLSO.  On aspirin 325 mg daily  for DVT prophylaxis.  Is requiring 5 doses per day of Dilaudid.  Discussed weaning of narcotics.  She is in agreement.  Will limit Dilaudid to 4 times daily.  Encourage patient to continue to wean.      Lymphedema  Lymphedema therapy remains closely involved.  Lymphedema greatly reduced.  Now that Lasix burst completes, will continue to monitor for increased edema    Brachial artery thrombosis (H)  Stable.  No changes.  Continue with aspirin 325 mg    Postoperative anemia due to acute blood loss  Generalized weakness.  Hemoglobin was not done last week.  Will order for this week    Essential hypertension  Continues on metoprolol.  Blood pressures continue on the soft side.  Asymptomatic.  Continue to monitor.    05/20/2019 09:36 103/62 mmHg (Sitting r/arm)  05/20/2019 01:27 91/61 mmHg  05/19/2019 21:51 80/56 mmHg  05/19/2019 21:30 80/56 mmHg (Sitting l/arm)  05/19/2019 15:47 97/76 mmHg (Lying r/arm)  05/19/2019 12:02 110/62 mmHg (Sitting r/arm)  05/19/2019 07:49 120/64 mmHg (Lying l/arm)  05/18/2019 20:16 102/62 mmHg (Sitting r/arm    Drug-induced constipation  Constipation requiring MiraLAX, suppository, and digital removal.  Imodium discontinued, on senna, however stools are now softening again.  We will continue to monitor.  Constipation should ease with decrease narcotics as well    Moderate protein-calorie malnutrition (H)  Weight down from 1 12-1 03.  However suspect this is primarily fluid.  BMI 20.43  Continue with supplements and educated in good nutrition    05/20/2019 09:56 103.2 Lbs (Wheelchair)  05/19/2019 14:29 105 Lbs (Wheelchair)  05/16/2019 09:55 104.4 Lbs (Wheelchair)  05/15/2019 10:24 105.4 Lbs (Wheelchair)  05/07/2019, 112.8 lbs, -6%, -6.8 lbs]  05/13/2019 09:33 106 Lbs (Wheelchair)  05/07/2019, 112.8 lbs, -5.5%, -6.2 lbs]  05/12/2019 10:34 106.6 Lbs (Wheelchair)  05/11/2019 11:30 106 Lbs (Wheelchair)  05/10/2019 09:42 107.6 Lbs (Wheelchair)  05/08/2019 09:49 110.6 Lbs (Wheelchair)  05/07/2019  21:27 112.8 Lbs (Wheelchair)    MS (multiple sclerosis) (H)  Chronic, stable, can factor into speed of rehab.    Adjustment Disorder  Insomnia  Patient has required trazodone in order to sleep every night.  It is currently as needed.  Will schedule which is what she is accustomed to doing at home as well      transcribed by : Camille Muse  Orders:  1. Discontinue PRN Trazodone  2. Schedule Trazodone 100 mg PO at bedtime - sleep  3. Discontinue current hydromorphone  4. Hydromorphone 2 mg PO QID PRN - Pain  5. Next lab day: CBC (anemia), BMP (decrease K+), and magnesium (decrease g)  6. Dressing change to back surgical wounds BID - cover with ABD. Report any increased drainage, erythema, edema to NP  7. Please call Ortho - re: suture removal - wait for Ortho appt or ok to remove at TCU      Electronically signed by:  CLIVE Mcguire CNP           Sincerely,        CLIVE Mcguire CNP

## 2019-05-22 ENCOUNTER — NURSING HOME VISIT (OUTPATIENT)
Dept: GERIATRICS | Facility: CLINIC | Age: 74
End: 2019-05-22

## 2019-05-22 VITALS
TEMPERATURE: 98.2 F | WEIGHT: 105.2 LBS | SYSTOLIC BLOOD PRESSURE: 116 MMHG | DIASTOLIC BLOOD PRESSURE: 68 MMHG | OXYGEN SATURATION: 98 % | BODY MASS INDEX: 20.65 KG/M2 | HEART RATE: 83 BPM | HEIGHT: 60 IN | RESPIRATION RATE: 16 BRPM

## 2019-05-22 DIAGNOSIS — I89.0 LYMPHEDEMA: Primary | ICD-10-CM

## 2019-05-22 DIAGNOSIS — L03.031 CELLULITIS OF TOE OF RIGHT FOOT: ICD-10-CM

## 2019-05-22 PROCEDURE — 99309 SBSQ NF CARE MODERATE MDM 30: CPT | Performed by: NURSE PRACTITIONER

## 2019-05-22 ASSESSMENT — MIFFLIN-ST. JEOR: SCORE: 903.68

## 2019-05-22 NOTE — PROGRESS NOTES
[facility]  [place] Medical Record Number:  [id number]  Place of Service where encounter took place:  [facility] ([facility]) [[id number]]  Chief Complaint   Patient presents with     RECHECK       HPI:    Minerva Blanco  is a 74 year old (1945), who is being seen today for an episodic care visit.  HPI information obtained from: facility chart records, facility staff and patient report. Today's concern is:     Cellulitis of toe of right foot  Lymphedema   Nursing staff noticed that patient has a red area on right toe adjacent to the lateral nail that is reddened and starting to ooze pus.  Patient indicates pain in toe.  Also noted increase in lymphedema and need to resort to wraps again.    Past Medical and Surgical History reviewed in Epic today.    MEDICATIONS:  Current Outpatient Medications   Medication Sig Dispense Refill     Acetaminophen (TYLENOL EXTRA STRENGTH PO) Take 1,000 mg by mouth 3 times daily And 500 mg by mouth 2 times daily as needed       aspirin (ASA) 325 MG EC tablet Take 325 mg by mouth daily Give with meal       atorvastatin (LIPITOR) 80 MG tablet Take 80 mg by mouth daily        benzonatate (TESSALON) 100 MG capsule Take 100 mg by mouth 3 times daily as needed for cough       Calcium Carb-Cholecalciferol (OS-ANNABELLE PO) Take 1 tablet by mouth 3 times daily (with meals)       DULOXETINE HCL PO Take 30 mg by mouth daily       ferrous sulfate (FEROSUL) 325 (65 Fe) MG tablet Take 325 mg by mouth daily (with breakfast)       furosemide (LASIX) 20 MG tablet Take 20 mg by mouth 2 times daily       gabapentin (NEURONTIN) 400 MG capsule Take 400 mg by mouth 2 times daily       HYDROmorphone (DILAUDID) 2 MG tablet Take 2 mg by mouth 4 times daily as needed for severe pain        lidocaine (LIDODERM) 5 % patch Apply to painful area topically 2 times a day - 12 hours on and 12 hours off       loperamide (IMODIUM A-D) 2 MG tablet Give 4 mg by mouth as needed for Loose stools Give 4mg  with first loose stool AND Give 2 mg by mouth as needed for Loose Stools 2 mg with each subsequent loose stool episode after initial 4 mg first dose. NOT TO EXCEED 16 MG IN 24       magnesium oxide 400 MG CAPS Take 400 mg by mouth 2 times daily       MELATONIN PO Take 10 mg by mouth At Bedtime        metoprolol succinate (TOPROL-XL) 50 MG 24 hr tablet Take 50 mg by mouth daily        multivitamin w/minerals (THERA-VIT-M) tablet Take 1 tablet by mouth daily       Nutritional Supplements (NUTRITIONAL SUPPLEMENT PO) Take by mouth At Bedtime Snack at  hs.       omeprazole 20 MG tablet Take 1 tablet (20 mg) by mouth 2 times daily 180 tablet 3     OTHER MEDICAL SUPPLIES every morning Daily weights.       OTHER MEDICAL SUPPLIES 4 times daily BP checks qid.       OTHER MEDICAL SUPPLIES Legs: Black socks and Compriflex LEs. Thigh high wraps. Leave on at all times. If soiled may remove thigh portions.    every shift       polyethylene glycol (MIRALAX/GLYCOLAX) packet Take 1 packet by mouth daily as needed for constipation       POTASSIUM CHLORIDE ER PO Take 40 mEq by mouth daily       ranitidine (ZANTAC) 75 MG tablet Take 75 mg by mouth 2 times daily        senna-docusate (SENOKOT-S/PERICOLACE) 8.6-50 MG tablet Take 1 tablet by mouth 2 times daily       TRAZODONE HCL PO Take 100 mg by mouth At Bedtime        DULoxetine (CYMBALTA) 30 MG capsule Take 30 mg by mouth daily       medical cannabis (Patient's own supply.  Not a prescription) 1 Units Patient report she takes at home.  Not taking at TCU       REVIEW OF SYSTEMS:  4 point ROS including Respiratory, CV, GI and , other than that noted in the HPI,  is negative    Objective:  /68   Pulse 83   Temp 98.2  F (36.8  C)   Resp 16   Ht 1.524 m (5')   Wt 47.7 kg (105 lb 3.2 oz)   SpO2 98%   BMI 20.55 kg/m    Exam:  GENERAL APPEARANCE:  Alert, in no distress, thin, Sitting comfortably in wheelchair with legs in lymphedema wraps.  Other than extremities, cachectic  looking with prominent clavicle and facial bones  RESP:  lungs clear to auscultation , no respiratory distress  CV:  Heart rate and rhythm irregularly irregular.  Lower extremity edema 3+, wrapped in lymphedema wraps.  Upper extremity edema 1+.  Wearing tensoshapes  SKIN:  Skin on right great toe erythematous edematous.  With slight pressure was able to evacuate approximately 1 cc of tan-colored pus.  Nail thickened and becoming loose.  See photo           Labs:   Recent labs in [facility] reviewed by me today.    Potassium 2.9    ASSESSMENT/PLAN:  Cellulitis of toe of left foot  Lymphedema  Appears to be start of cellulitis and right great toe.  Unable to soak due to lymphedema wraps.  Discussed using moist warm washcloth wrapped in plastic wrap to maintain heat to help draw out drainage.  Will use bacitracin for localized antimicrobial activity.  Lymphedema increased.  Discussed with therapist.  We will do another burst of Lasix.  Because potassium is on the low side will increase replacement and recheck later this week.      transcribed by : Camille Muse  Orders:  1. Lasix 20 mg BID x 3 days  2. KCL 40 meq daily x 3 days  3. Warm, moist packs to right great toe/between great toe & 2nd toe for 20 minutes every 2 hours while awake - Dx: Cellilitis  4. Bacitracin to Rt. Great toe nail edge BID - Dx: Cellulitis  5. BMP on 5/24 - Dx: decreased potassium      Electronically signed by:  CLIVE Mcguire CNP

## 2019-05-22 NOTE — LETTER
5/22/2019        RE: Minerva Blanco  [address]  [place] [address]        [facility]  [place] Medical Record Number:  [id number]  Place of Service where encounter took place:  [facility] ([facility]) [[id number]]  Chief Complaint   Patient presents with     RECHECK       HPI:    Minerva Blanco  is a 74 year old (1945), who is being seen today for an episodic care visit.  HPI information obtained from: facility chart records, facility staff and patient report. Today's concern is:     Cellulitis of toe of right foot  Lymphedema   Nursing staff noticed that patient has a red area on right toe adjacent to the lateral nail that is reddened and starting to ooze pus.  Patient indicates pain in toe.  Also noted increase in lymphedema and need to resort to wraps again.    Past Medical and Surgical History reviewed in Epic today.    MEDICATIONS:  Current Outpatient Medications   Medication Sig Dispense Refill     Acetaminophen (TYLENOL EXTRA STRENGTH PO) Take 1,000 mg by mouth 3 times daily And 500 mg by mouth 2 times daily as needed       aspirin (ASA) 325 MG EC tablet Take 325 mg by mouth daily Give with meal       atorvastatin (LIPITOR) 80 MG tablet Take 80 mg by mouth daily        benzonatate (TESSALON) 100 MG capsule Take 100 mg by mouth 3 times daily as needed for cough       Calcium Carb-Cholecalciferol (OS-ANNABELLE PO) Take 1 tablet by mouth 3 times daily (with meals)       DULOXETINE HCL PO Take 30 mg by mouth daily       ferrous sulfate (FEROSUL) 325 (65 Fe) MG tablet Take 325 mg by mouth daily (with breakfast)       furosemide (LASIX) 20 MG tablet Take 20 mg by mouth 2 times daily       gabapentin (NEURONTIN) 400 MG capsule Take 400 mg by mouth 2 times daily       HYDROmorphone (DILAUDID) 2 MG tablet Take 2 mg by mouth 4 times daily as needed for severe pain        lidocaine (LIDODERM) 5 % patch Apply to painful area topically 2 times a day - 12 hours on and 12  hours off       loperamide (IMODIUM A-D) 2 MG tablet Give 4 mg by mouth as needed for Loose stools Give 4mg with first loose stool AND Give 2 mg by mouth as needed for Loose Stools 2 mg with each subsequent loose stool episode after initial 4 mg first dose. NOT TO EXCEED 16 MG IN 24       magnesium oxide 400 MG CAPS Take 400 mg by mouth 2 times daily       MELATONIN PO Take 10 mg by mouth At Bedtime        metoprolol succinate (TOPROL-XL) 50 MG 24 hr tablet Take 50 mg by mouth daily        multivitamin w/minerals (THERA-VIT-M) tablet Take 1 tablet by mouth daily       Nutritional Supplements (NUTRITIONAL SUPPLEMENT PO) Take by mouth At Bedtime Snack at  hs.       omeprazole 20 MG tablet Take 1 tablet (20 mg) by mouth 2 times daily 180 tablet 3     OTHER MEDICAL SUPPLIES every morning Daily weights.       OTHER MEDICAL SUPPLIES 4 times daily BP checks qid.       OTHER MEDICAL SUPPLIES Legs: Black socks and Compriflex LEs. Thigh high wraps. Leave on at all times. If soiled may remove thigh portions.    every shift       polyethylene glycol (MIRALAX/GLYCOLAX) packet Take 1 packet by mouth daily as needed for constipation       POTASSIUM CHLORIDE ER PO Take 40 mEq by mouth daily       ranitidine (ZANTAC) 75 MG tablet Take 75 mg by mouth 2 times daily        senna-docusate (SENOKOT-S/PERICOLACE) 8.6-50 MG tablet Take 1 tablet by mouth 2 times daily       TRAZODONE HCL PO Take 100 mg by mouth At Bedtime        DULoxetine (CYMBALTA) 30 MG capsule Take 30 mg by mouth daily       medical cannabis (Patient's own supply.  Not a prescription) 1 Units Patient report she takes at home.  Not taking at TCU       REVIEW OF SYSTEMS:  4 point ROS including Respiratory, CV, GI and , other than that noted in the HPI,  is negative    Objective:  /68   Pulse 83   Temp 98.2  F (36.8  C)   Resp 16   Ht 1.524 m (5')   Wt 47.7 kg (105 lb 3.2 oz)   SpO2 98%   BMI 20.55 kg/m     Exam:  GENERAL APPEARANCE:  Alert, in no distress,  thin, Sitting comfortably in wheelchair with legs in lymphedema wraps.  Other than extremities, cachectic looking with prominent clavicle and facial bones  RESP:  lungs clear to auscultation , no respiratory distress  CV:  Heart rate and rhythm irregularly irregular.  Lower extremity edema 3+, wrapped in lymphedema wraps.  Upper extremity edema 1+.  Wearing tensoshapes  SKIN:  Skin on right great toe erythematous edematous.  With slight pressure was able to evacuate approximately 1 cc of tan-colored pus.  Nail thickened and becoming loose.  See photo           Labs:   Recent labs in [facility] reviewed by me today.    Potassium 2.9    ASSESSMENT/PLAN:  Cellulitis of toe of left foot  Lymphedema  Appears to be start of cellulitis and right great toe.  Unable to soak due to lymphedema wraps.  Discussed using moist warm washcloth wrapped in plastic wrap to maintain heat to help draw out drainage.  Will use bacitracin for localized antimicrobial activity.  Lymphedema increased.  Discussed with therapist.  We will do another burst of Lasix.  Because potassium is on the low side will increase replacement and recheck later this week.      transcribed by : Camille Muse  Orders:  1. Lasix 20 mg BID x 3 days  2. KCL 40 meq daily x 3 days  3. Warm, moist packs to right great toe/between great toe & 2nd toe for 20 minutes every 2 hours while awake - Dx: Cellilitis  4. Bacitracin to Rt. Great toe nail edge BID - Dx: Cellulitis  5. BMP on 5/24 - Dx: decreased potassium      Electronically signed by:  CLIVE Mcguire CNP           Sincerely,        CLIVE Mcguire CNP

## 2019-05-28 ENCOUNTER — NURSING HOME VISIT (OUTPATIENT)
Dept: GERIATRICS | Facility: CLINIC | Age: 74
End: 2019-05-28

## 2019-05-28 VITALS
WEIGHT: 110.6 LBS | RESPIRATION RATE: 16 BRPM | TEMPERATURE: 98 F | HEIGHT: 60 IN | BODY MASS INDEX: 21.71 KG/M2 | SYSTOLIC BLOOD PRESSURE: 125 MMHG | OXYGEN SATURATION: 94 % | HEART RATE: 70 BPM | DIASTOLIC BLOOD PRESSURE: 80 MMHG

## 2019-05-28 DIAGNOSIS — I50.31 ACUTE DIASTOLIC HEART FAILURE (H): ICD-10-CM

## 2019-05-28 DIAGNOSIS — I89.0 LYMPHEDEMA: Primary | ICD-10-CM

## 2019-05-28 DIAGNOSIS — E44.0 MODERATE PROTEIN-CALORIE MALNUTRITION (H): ICD-10-CM

## 2019-05-28 DIAGNOSIS — K58.2 IRRITABLE BOWEL SYNDROME WITH BOTH CONSTIPATION AND DIARRHEA: ICD-10-CM

## 2019-05-28 DIAGNOSIS — R60.1 ANASARCA: ICD-10-CM

## 2019-05-28 DIAGNOSIS — E87.79 OTHER HYPERVOLEMIA: ICD-10-CM

## 2019-05-28 PROCEDURE — 99310 SBSQ NF CARE HIGH MDM 45: CPT | Performed by: FAMILY MEDICINE

## 2019-05-28 PROCEDURE — 99356 ZZC PROLONGED SERV,INPATIENT,1ST HR: CPT | Performed by: FAMILY MEDICINE

## 2019-05-28 RX ORDER — CEPHALEXIN 250 MG/1
250 TABLET ORAL 4 TIMES DAILY
COMMUNITY
End: 2019-06-05

## 2019-05-28 ASSESSMENT — MIFFLIN-ST. JEOR: SCORE: 928.18

## 2019-05-28 NOTE — LETTER
5/28/2019        RE: Minerva Blanco  [address]  [place] [address]        [facility]  [place] Medical Record Number:  [id number]  [facility] of Service where encounter took place:  [facility] [[id number]]  Chief Complaint   Patient presents with     RECHECK       HPI:    Minerva Blanco  is a 74 year old (1945), who is being seen today for an episodic care visit.  HPI information obtained from: facility staff, patient report, daughter at the bedside,  [facility] chart review and Care Everywhere [facility] chart review.     Today's concern is: worsening peripheral edema and IBS  -Patient seen and examined today in her in the presence of her daughter Dari who reports that her mother legs edema is not getting better.   - Pt reports sleeps with the head side very elevated, legs swelling is slightly better but the water in the thighs is worse. Denies PND or chest pain.  Patient reports that in the past she had swelling legs after being hospitalized but that was resolved within a short period of time  - daughter reports that her mother is not progressing with therapy because of the significant swelling the legs and that her mom has complete foot drop on the right side  and partial on the left side, that is been having a problem finding appropriate brace size given current edema.     -Patient is very frustrated that she was told she will be sent home next week or not making progress with the rehab program and this has caused her IBS to flare of, complaining of frequent cramping abdominal pain and spasm pain in the rectum, aggravated with feeling anxious and stressed, associated with frequent defecation of small stool.  Patient is asking increase fibers,and daughter is  requesting to increase  Fibers or to prescribe a medication to help with her IBS   . Daughter reports that her mother was so constipated to the extend she required manual stool disimpaction at  this facility.     Past Medical and Surgical History reviewed in Epic today.    MEDICATIONS:  Current Outpatient Medications   Medication Sig Dispense Refill     Acetaminophen (TYLENOL EXTRA STRENGTH PO) Take 1,000 mg by mouth 3 times daily And 500 mg by mouth 2 times daily as needed       aspirin (ASA) 325 MG EC tablet Take 325 mg by mouth daily Give with meal       atorvastatin (LIPITOR) 80 MG tablet Take 80 mg by mouth daily        benzonatate (TESSALON) 100 MG capsule Take 100 mg by mouth 3 times daily as needed for cough       Calcium Carb-Cholecalciferol (OS-ANNABELLE PO) Take 1 tablet by mouth 3 times daily (with meals)       DULoxetine (CYMBALTA) 30 MG capsule Take 30 mg by mouth daily       DULOXETINE HCL PO Take 30 mg by mouth daily       ferrous sulfate (FEROSUL) 325 (65 Fe) MG tablet Take 325 mg by mouth daily (with breakfast)       gabapentin (NEURONTIN) 400 MG capsule Take 400 mg by mouth 2 times daily       HYDROmorphone (DILAUDID) 2 MG tablet Take 2 mg by mouth 4 times daily as needed for severe pain        lidocaine (LIDODERM) 5 % patch Apply to painful area topically 2 times a day - 12 hours on and 12 hours off       loperamide (IMODIUM A-D) 2 MG tablet Give 4 mg by mouth as needed for Loose stools Give 4mg with first loose stool AND Give 2 mg by mouth as needed for Loose Stools 2 mg with each subsequent loose stool episode after initial 4 mg first dose. NOT TO EXCEED 16 MG IN 24       magnesium oxide 400 MG CAPS Take 400 mg by mouth 2 times daily       medical cannabis (Patient's own supply.  Not a prescription) 1 Units Patient report she takes at home.  Not taking at TCU       MELATONIN PO Take 10 mg by mouth At Bedtime        metoprolol succinate (TOPROL-XL) 50 MG 24 hr tablet Take 50 mg by mouth daily        multivitamin w/minerals (THERA-VIT-M) tablet Take 1 tablet by mouth daily       Nutritional Supplements (NUTRITIONAL SUPPLEMENT PO) Take by mouth At Bedtime Snack at  hs.       omeprazole 20 MG  tablet Take 1 tablet (20 mg) by mouth 2 times daily 180 tablet 3     OTHER MEDICAL SUPPLIES every morning Daily weights.       OTHER MEDICAL SUPPLIES 4 times daily BP checks qid.       OTHER MEDICAL SUPPLIES Legs: Black socks and Compriflex LEs. Thigh high wraps. Leave on at all times. If soiled may remove thigh portions.    every shift       polyethylene glycol (MIRALAX/GLYCOLAX) packet Take 1 packet by mouth daily as needed for constipation       ranitidine (ZANTAC) 75 MG tablet Take 75 mg by mouth 2 times daily        senna-docusate (SENOKOT-S/PERICOLACE) 8.6-50 MG tablet Take 1 tablet by mouth 2 times daily       TRAZODONE HCL PO Take 100 mg by mouth At Bedtime        REVIEW OF SYSTEMS:  4 point ROS including Respiratory, CV, GI and , other than that noted in the HPI,  is negative    Objective:  /80   Pulse 70   Temp 98  F (36.7  C)   Resp 16   Ht 1.524 m (5')   Wt 50.2 kg (110 lb 9.6 oz)   SpO2 94%   BMI 21.60 kg/m     Exam:  General: sitting in the WC, tired looking.   CVS: S1S2 regular but deep, no murmur appreciated.   LUNG: good air entry anteriorly, and upper 1/3 on the posterior side on the left and upper 1/2 on the right, distal to that there is diffuse crackles, and crackles heard over the bases mainly over the left base.   EXTREMITIES: 3+pitting edema feet, wrapped legs, 2+ pitting edema thigh, traces over abdominal wall.   PSYCH: emotional alternative smile and tears during interview, noted to have abdominal pain when anxious and tearful.   ABDOMEN: soft, NTND, sluggish BS, no palpable mass.       Labs:   Recent labs in [facility] reviewed by me today.     ASSESSMENT/PLAN:  VOLUME OVERLOAD  LYMPHEDEMA  ANASARCA  ? CHF EXACERBATION  Protein Malnutrition  - Pt with recent extensive spine surgery, hospitalization was complicated with acute blood loss anemia and hypovolemic shock required pressor, imaging showed b/l, developed BLE edema pleural effusion but Echo (4/27/19) showed EF 65% and normal  LVF and RVF but mild-moderate AR and no WMA treated with IV lasix for 3 day and lymphedema therapy consulted.   - AT this facility given lasix burst on 5/14 for 5 days and lymphedema therapy consulted. Noted weight dropped from 112.8 lb on 5/7 to 103.2 lb 5/20, however later lymphedema got worse started on another Lasix burst on 5/22 for 3 days., and K was 2.9 supplement increased to 40 meq daily.   - on metoprolol succinate ER 50 mg  - Labs from 5/22: K 2.7, Na 136, Crtn 0.44 GFR > 90, Ca 7.9, no albumin level  BP Readings from Last 3 Encounters:   05/28/19 125/80   05/22/19 116/68   05/19/19 120/64   -   Pulse Readings from Last 4 Encounters:   05/28/19 70   05/22/19 83   05/17/19 103   05/13/19 68     # Pt is with ongoing anasarca, likely lymphadenia, vs volume overload (from excessive oral fluid intake) and physical deconditioning, creating a vicious cycle. Given crackles and coarse sounds in the lung, possibly a cardia as well. Atelectasis and pleural effusion is a possibility as well     #Will check CXR, Pro-BNP, CMP  -will give lasix 40 mg bid daily  -KCL 40 meq bid daily.   - Fluid restriction to 1500 ml  - weight daily  - VS q shift  -  follow on results in am  - counseled on the nature of her illness, counseled on using incentive spirometry and demonstrated to her the right way to perform.   -  to contract calves more frequent.     IBS-Mixed at this stage, IBS-D historically:  - recent intractable constipation 2/2 opioid, hence will not increase the dose.   - exacerbation is caused by anxiety Pt currently have from the discharging plan to home.   - spoke about not adding fibers at this stage given recent intractable constipation.  - on senna scheduled and miralax prn. Will continue for now  - assurance provided, advised a relaxation technique.       transcribed by : Camille Muse  Orders:  1. CXR - AP/Lat view - Dx: pleural effusion  2. CMP (dx: CAD), Pro-BNP (dx: CHF)  3. Lasix 40  mg BID daily - Dx: volume overload)  4. KCL - 40 meq BID daily - Dx: low k  5. Weight check daily  6. VS Q shift daily  7. Fluid restriction to 1500 ml - Dx: CHF?  8. Salt restriction to 2 grams  9. Protein supplement per dietician choice    Total time spent with patient visit at the [facility] was 70 minutes including patient visit, review of past records and speaking to nursing staff, lymphedema therapist, daughter at the bedside. Greater than 50% of total time spent with counseling and coordinating care due to above ongoing acute conditions.    - face to face time started at 11: 35 and completed at 12: 10    Electronically signed by:  Jacquelyn Martinez MD           Sincerely,        Jacquelyn Martinez MD

## 2019-05-28 NOTE — PROGRESS NOTES
[facility]  [facility] Record Number:  [id number]  Place of Service where encounter took place:  [facility] ([facility]) [[id number]]  Chief Complaint   Patient presents with     RECHECK       HPI:    Minerva Blanco  is a 74 year old (1945), who is being seen today for an episodic care visit.  HPI information obtained from: facility staff, patient report, daughter at the bedside,  [facility] chart review and Care Everywhere [facility] chart review.     Today's concern is: worsening peripheral edema and IBS  -Patient seen and examined today in her in the presence of her daughter Dari who reports that her mother legs edema is not getting better.   - Pt reports sleeps with the head side very elevated, legs swelling is slightly better but the water in the thighs is worse. Denies PND or chest pain.  Patient reports that in the past she had swelling legs after being hospitalized but that was resolved within a short period of time  - daughter reports that her mother is not progressing with therapy because of the significant swelling the legs and that her mom has complete foot drop on the right side  and partial on the left side, that is been having a problem finding appropriate brace size given current edema.     -Patient is very frustrated that she was told she will be sent home next week or not making progress with the rehab program and this has caused her IBS to flare of, complaining of frequent cramping abdominal pain and spasm pain in the rectum, aggravated with feeling anxious and stressed, associated with frequent defecation of small stool.  Patient is asking increase fibers,and daughter is  requesting to increase  Fibers or to prescribe a medication to help with her IBS   . Daughter reports that her mother was so constipated to the extend she required manual stool disimpaction at this facility.     Past Medical and Surgical History reviewed in Epic today.    MEDICATIONS:  Current  Outpatient Medications   Medication Sig Dispense Refill     Acetaminophen (TYLENOL EXTRA STRENGTH PO) Take 1,000 mg by mouth 3 times daily And 500 mg by mouth 2 times daily as needed       aspirin (ASA) 325 MG EC tablet Take 325 mg by mouth daily Give with meal       atorvastatin (LIPITOR) 80 MG tablet Take 80 mg by mouth daily        benzonatate (TESSALON) 100 MG capsule Take 100 mg by mouth 3 times daily as needed for cough       Calcium Carb-Cholecalciferol (OS-ANNABELLE PO) Take 1 tablet by mouth 3 times daily (with meals)       DULoxetine (CYMBALTA) 30 MG capsule Take 30 mg by mouth daily       DULOXETINE HCL PO Take 30 mg by mouth daily       ferrous sulfate (FEROSUL) 325 (65 Fe) MG tablet Take 325 mg by mouth daily (with breakfast)       gabapentin (NEURONTIN) 400 MG capsule Take 400 mg by mouth 2 times daily       HYDROmorphone (DILAUDID) 2 MG tablet Take 2 mg by mouth 4 times daily as needed for severe pain        lidocaine (LIDODERM) 5 % patch Apply to painful area topically 2 times a day - 12 hours on and 12 hours off       loperamide (IMODIUM A-D) 2 MG tablet Give 4 mg by mouth as needed for Loose stools Give 4mg with first loose stool AND Give 2 mg by mouth as needed for Loose Stools 2 mg with each subsequent loose stool episode after initial 4 mg first dose. NOT TO EXCEED 16 MG IN 24       magnesium oxide 400 MG CAPS Take 400 mg by mouth 2 times daily       medical cannabis (Patient's own supply.  Not a prescription) 1 Units Patient report she takes at home.  Not taking at TCU       MELATONIN PO Take 10 mg by mouth At Bedtime        metoprolol succinate (TOPROL-XL) 50 MG 24 hr tablet Take 50 mg by mouth daily        multivitamin w/minerals (THERA-VIT-M) tablet Take 1 tablet by mouth daily       Nutritional Supplements (NUTRITIONAL SUPPLEMENT PO) Take by mouth At Bedtime Snack at  hs.       omeprazole 20 MG tablet Take 1 tablet (20 mg) by mouth 2 times daily 180 tablet 3     OTHER MEDICAL SUPPLIES every  morning Daily weights.       OTHER MEDICAL SUPPLIES 4 times daily BP checks qid.       OTHER MEDICAL SUPPLIES Legs: Black socks and Compriflex LEs. Thigh high wraps. Leave on at all times. If soiled may remove thigh portions.    every shift       polyethylene glycol (MIRALAX/GLYCOLAX) packet Take 1 packet by mouth daily as needed for constipation       ranitidine (ZANTAC) 75 MG tablet Take 75 mg by mouth 2 times daily        senna-docusate (SENOKOT-S/PERICOLACE) 8.6-50 MG tablet Take 1 tablet by mouth 2 times daily       TRAZODONE HCL PO Take 100 mg by mouth At Bedtime        REVIEW OF SYSTEMS:  4 point ROS including Respiratory, CV, GI and , other than that noted in the HPI,  is negative    Objective:  /80   Pulse 70   Temp 98  F (36.7  C)   Resp 16   Ht 1.524 m (5')   Wt 50.2 kg (110 lb 9.6 oz)   SpO2 94%   BMI 21.60 kg/m    Exam:  General: sitting in the WC, tired looking.   CVS: S1S2 regular but deep, no murmur appreciated.   LUNG: good air entry anteriorly, and upper 1/3 on the posterior side on the left and upper 1/2 on the right, distal to that there is diffuse crackles, and crackles heard over the bases mainly over the left base.   EXTREMITIES: 3+pitting edema feet, wrapped legs, 2+ pitting edema thigh, traces over abdominal wall.   PSYCH: emotional alternative smile and tears during interview, noted to have abdominal pain when anxious and tearful.   ABDOMEN: soft, NTND, sluggish BS, no palpable mass.       Labs:   Recent labs in [facility] reviewed by me today.     ASSESSMENT/PLAN:  VOLUME OVERLOAD  LYMPHEDEMA  ANASARCA  ? CHF EXACERBATION  Protein Malnutrition  - Pt with recent extensive spine surgery, hospitalization was complicated with acute blood loss anemia and hypovolemic shock required pressor, imaging showed b/l, developed BLE edema pleural effusion but Echo (4/27/19) showed EF 65% and normal LVF and RVF but mild-moderate AR and no WMA treated with IV lasix for 3 day and lymphedema therapy  consulted.   - AT this facility given lasix burst on 5/14 for 5 days and lymphedema therapy consulted. Noted weight dropped from 112.8 lb on 5/7 to 103.2 lb 5/20, however later lymphedema got worse started on another Lasix burst on 5/22 for 3 days., and K was 2.9 supplement increased to 40 meq daily.   - on metoprolol succinate ER 50 mg  - Labs from 5/22: K 2.7, Na 136, Crtn 0.44 GFR > 90, Ca 7.9, no albumin level  BP Readings from Last 3 Encounters:   05/28/19 125/80   05/22/19 116/68   05/19/19 120/64   -   Pulse Readings from Last 4 Encounters:   05/28/19 70   05/22/19 83   05/17/19 103   05/13/19 68     # Pt is with ongoing anasarca, likely lymphadenia, vs volume overload (from excessive oral fluid intake) and physical deconditioning, creating a vicious cycle. Given crackles and coarse sounds in the lung, possibly a cardia as well. Atelectasis and pleural effusion is a possibility as well     #Will check CXR, Pro-BNP, CMP  -will give lasix 40 mg bid daily  -KCL 40 meq bid daily.   - Fluid restriction to 1500 ml  - weight daily  - VS q shift  -  follow on results in am  - counseled on the nature of her illness, counseled on using incentive spirometry and demonstrated to her the right way to perform.   -  to contract calves more frequent.     IBS-Mixed at this stage, IBS-D historically:  - recent intractable constipation 2/2 opioid, hence will not increase the dose.   - exacerbation is caused by anxiety Pt currently have from the discharging plan to home.   - spoke about not adding fibers at this stage given recent intractable constipation.  - on senna scheduled and miralax prn. Will continue for now  - assurance provided, advised a relaxation technique.       transcribed by : Camille Muse  Orders:  1. CXR - AP/Lat view - Dx: pleural effusion  2. CMP (dx: CAD), Pro-BNP (dx: CHF)  3. Lasix 40 mg BID daily - Dx: volume overload)  4. KCL - 40 meq BID daily - Dx: low k  5. Weight check  daily  6. VS Q shift daily  7. Fluid restriction to 1500 ml - Dx: CHF?  8. Salt restriction to 2 grams  9. Protein supplement per dietician choice    Total time spent with patient visit at the [facility] nursing facility was 70 minutes including patient visit, review of past records and speaking to nursing staff, lymphedema therapist, daughter at the bedside. Greater than 50% of total time spent with counseling and coordinating care due to above ongoing acute conditions.    - face to face time started at 11: 35 and completed at 12: 10    Electronically signed by:  Jacquelyn Martinez MD

## 2019-06-03 ENCOUNTER — COMMUNICATION - HEALTHEAST (OUTPATIENT)
Dept: CARDIOLOGY | Facility: CLINIC | Age: 74
End: 2019-06-03

## 2019-06-04 ENCOUNTER — AMBULATORY - HEALTHEAST (OUTPATIENT)
Dept: CARDIOLOGY | Facility: CLINIC | Age: 74
End: 2019-06-04

## 2019-06-05 ENCOUNTER — OFFICE VISIT - HEALTHEAST (OUTPATIENT)
Dept: CARDIOLOGY | Facility: CLINIC | Age: 74
End: 2019-06-05

## 2019-06-05 ENCOUNTER — DISCHARGE SUMMARY NURSING HOME (OUTPATIENT)
Dept: GERIATRICS | Facility: CLINIC | Age: 74
End: 2019-06-05

## 2019-06-05 VITALS
WEIGHT: 108.5 LBS | HEIGHT: 60 IN | DIASTOLIC BLOOD PRESSURE: 43 MMHG | TEMPERATURE: 98.9 F | OXYGEN SATURATION: 98 % | BODY MASS INDEX: 21.3 KG/M2 | HEART RATE: 61 BPM | SYSTOLIC BLOOD PRESSURE: 116 MMHG | RESPIRATION RATE: 16 BRPM

## 2019-06-05 DIAGNOSIS — S32.010D CLOSED COMPRESSION FRACTURE OF L1 LUMBAR VERTEBRA WITH ROUTINE HEALING, SUBSEQUENT ENCOUNTER: Primary | ICD-10-CM

## 2019-06-05 DIAGNOSIS — I10 ESSENTIAL HYPERTENSION: ICD-10-CM

## 2019-06-05 DIAGNOSIS — F43.22 ADJUSTMENT DISORDER WITH ANXIOUS MOOD: ICD-10-CM

## 2019-06-05 DIAGNOSIS — E87.6 HYPOKALEMIA: ICD-10-CM

## 2019-06-05 DIAGNOSIS — L03.031 CELLULITIS OF TOE OF RIGHT FOOT: ICD-10-CM

## 2019-06-05 DIAGNOSIS — K59.03 DRUG-INDUCED CONSTIPATION: ICD-10-CM

## 2019-06-05 DIAGNOSIS — D62 POSTOPERATIVE ANEMIA DUE TO ACUTE BLOOD LOSS: ICD-10-CM

## 2019-06-05 DIAGNOSIS — I25.10 CORONARY ARTERY DISEASE INVOLVING NATIVE CORONARY ARTERY OF NATIVE HEART WITHOUT ANGINA PECTORIS: ICD-10-CM

## 2019-06-05 DIAGNOSIS — I50.811 ACUTE RIGHT-SIDED HEART FAILURE (H): ICD-10-CM

## 2019-06-05 DIAGNOSIS — Z90.49 HX OF ESOPHAGECTOMY: ICD-10-CM

## 2019-06-05 DIAGNOSIS — Z98.890 HX OF ESOPHAGECTOMY: ICD-10-CM

## 2019-06-05 DIAGNOSIS — E44.0 MODERATE PROTEIN-CALORIE MALNUTRITION (H): ICD-10-CM

## 2019-06-05 DIAGNOSIS — G35 MS (MULTIPLE SCLEROSIS) (H): ICD-10-CM

## 2019-06-05 DIAGNOSIS — Z98.890 S/P LUMBAR LAMINECTOMY: ICD-10-CM

## 2019-06-05 DIAGNOSIS — I74.2 BRACHIAL ARTERY THROMBOSIS (H): ICD-10-CM

## 2019-06-05 DIAGNOSIS — K58.2 IRRITABLE BOWEL SYNDROME WITH BOTH CONSTIPATION AND DIARRHEA: ICD-10-CM

## 2019-06-05 DIAGNOSIS — I21.3 ST ELEVATION MYOCARDIAL INFARCTION (STEMI), UNSPECIFIED ARTERY (H): ICD-10-CM

## 2019-06-05 DIAGNOSIS — I50.31 ACUTE DIASTOLIC HEART FAILURE (H): ICD-10-CM

## 2019-06-05 DIAGNOSIS — G47.00 INSOMNIA, UNSPECIFIED TYPE: ICD-10-CM

## 2019-06-05 DIAGNOSIS — I89.0 LYMPHEDEMA: ICD-10-CM

## 2019-06-05 DIAGNOSIS — M48.062 SPINAL STENOSIS OF LUMBAR REGION WITH NEUROGENIC CLAUDICATION: ICD-10-CM

## 2019-06-05 PROCEDURE — 99308 SBSQ NF CARE LOW MDM 20: CPT | Performed by: NURSE PRACTITIONER

## 2019-06-05 ASSESSMENT — MIFFLIN-ST. JEOR
SCORE: 892.78
SCORE: 918.65

## 2019-06-05 ASSESSMENT — PAIN SCALES - GENERAL: PAINLEVEL: SEVERE PAIN (7)

## 2019-06-05 NOTE — LETTER
6/5/2019        RE: Minerva Blanco  [address]  [place] [address]        [facility] DISCHARGE SUMMARY  PATIENT'S NAME: Minerva Blanco  YOB: 1945  MEDICAL RECORD NUMBER:  [id number]  Place of Service where encounter took place:  [facility] ([facility]) [[id number]]    PRIMARY CARE PROVIDER AND CLINIC RESPONSIBLE AFTER TRANSFER:   Andrea Nino MD, [facility] [address] / [facility] [address]    Non-[facility] Provider     Transferring providers: CLIVE Mcguire CNP, Dr. Juan MD  Recent Hospitalization/ED:  [facility] stay 4/22/2019 to 5/7/2019.  Date of SNF Admission: May / 08 / 2019  Date of SNF (anticipated) Discharge: June / 07 / 2019  Discharged to: with family home  Cognitive Scores: BIMS: 15/15 , SLUMS 22/30  Physical Function: ambulating 50' wtih RW with CGA/SBA  DME: Wheelchair, hospital bed and DME F2F done 6-5-19    CODE STATUS/ADVANCE DIRECTIVES DISCUSSION:  Full Code   ALLERGIES: Amoxicillin; Ativan [lorazepam]; and Morphine    DISCHARGE DIAGNOSIS/NURSING FACILITY COURSE:   Closed compression fracture of L1 lumbar vertebra with routine healing, subsequent encounter  Spinal stenosis of lumbar region with neurogenic claudication  S/P lumbar laminectomy  Pain management has been challenging.  Currently pain managed with gabapentin 400 mg twice daily, Cymbalta 30 mg daily.   Has Dilaudid 2 mg every 3 hours as needed (still using consistently 2-3 x/day)   and Tylenol as needed.  Aspirin 325 mg for DVT prophylaxis  Started vitamin D and Calcium  External Bone growth stimulator recommended--coordinated through ortho  Wearing TLSO  Participated in therapies, however was self limiting          Brachial artery thrombosis (H)  Continue with aspirin 325 mg, monitor site for healing     Postoperative anemia due to acute blood loss  Stable at low to mid 8's.  On iron supplements      Lymphedema  All extremities and to  abdomen  Possibly related to volume overload 2/2 IVF hydration, HF. Has been extensive and challenging to manage.  3 diuretic bursts, in creased diuretic base, daily lymphedema, then every other day.     Acute diastolic heart failure  Coronary artery disease involving native coronary artery of native heart without angina pectoris  EF 65% at hospital with no WIMA, normal LVF  Status post PCI proximal mid RCA.  2 stent placements, inferior MI last year  Continues with aspirin 325 daily metoprolol 50 mg daily, Lipitor 80 mg daily  On fluid and salt restriction  CXR showed stable pleural effusion (and fx left 7th rib)  Follows with cardiology     Essential hypertension  Managed with metoprolol  Blood pressures acceptable range.  On the soft side. Diuretics added  Blood pressures:  2019 07:22 116/43 mmHg (Sitting r/arm)  2019 19:29 94/44 mmHg (Lying r/arm)  2019 16:57 89/42 mmHg (Lying r/arm)  2019 13:05 116/68 mmHg (Sitting r/arm)  2019 10:29 95/52 mmHg  2019 08:08 95/52 mmHg (Sitting r/arm)  2019 02:26 112/66 mmHg (Lying r/arm)     Hypokalemia  With chronic diarrhea  K+ stable at 3.6-3.7  Diarrhea, diuretics  Continues on Kcl 20mEq     Hx of esophagectomy  Long history with esophageal perforation status post gastric surgery.  Has been chronically on high dose of omeprazole 40 mg twice daily.  Discharge from hospital with 20 mg twice daily.  Discussed with patient's the risks of high-dose omeprazole including risk for C. difficile.  We  continued on 20 mg twice daily, and Zantac.  patient asymptomatic     Adjustment Disorder  Insomnia, unspecified type  Has had multiple medical complications in the past 3 years including multiple GI surgeries to feedings cardiac issues and now the back surgery.  In addition her   in September he was her primary caregiver.  Visited with in house psych and    Continues with trazodone 100 mg nightly and melatonin 3 mg nightly to  facilitate sleep    IBS  Drug induced constipation  Patient has  History of diarrhea related to IBS, treated with loperimide;  however, developed constipation required miralax, suppository and digital removal. Started on senna PRN    Malnutrition  Poor appetite, on supplements. Weight 108# (down from 112#, had been down to 103, possibly fluid related)    Cellulitis right toe  Treated with Keflex x 10 days      Multiple Sclerosis  Bilateral foot drop.  AFOs have not fit due to severe edema.  Referred to Orthotist    Past Medical History:  has a past medical history of Arrhythmia, Atrial fibrillation (H), Breast cancer (H) (2007), Chronic infection, Esophageal perforation, Fibromyalgia, GERD (gastroesophageal reflux disease), Hiatal hernia, History of blood transfusion, IBS (irritable bowel syndrome), Meniere's disease, MS (multiple sclerosis) (H), Multiple sclerosis (H), Noninfectious ileitis, and Other chronic pain. She also has no past medical history of Anemia, Diabetes (H), or PONV (postoperative nausea and vomiting).    Discharge Medications:  Current Outpatient Medications   Medication Sig Dispense Refill     Acetaminophen (TYLENOL EXTRA STRENGTH PO) Take 1,000 mg by mouth 3 times daily And 500 mg by mouth 2 times daily as needed       aspirin (ASA) 325 MG EC tablet Take 325 mg by mouth daily Give with meal       atorvastatin (LIPITOR) 80 MG tablet Take 80 mg by mouth daily        benzonatate (TESSALON) 100 MG capsule Take 100 mg by mouth 3 times daily as needed for cough       Calcium Carb-Cholecalciferol (OS-ANNABELLE PO) Take 1 tablet by mouth 3 times daily (with meals)       DULoxetine (CYMBALTA) 30 MG capsule Take 30 mg by mouth daily       ferrous sulfate (FEROSUL) 325 (65 Fe) MG tablet Take 325 mg by mouth daily (with breakfast)       furosemide (LASIX) 20 MG tablet Take 40 mg by mouth 2 times daily        gabapentin (NEURONTIN) 400 MG capsule Take 400 mg by mouth 2 times daily       HYDROmorphone (DILAUDID) 2  MG tablet Take 2 mg by mouth 4 times daily as needed for severe pain        lidocaine (LIDODERM) 5 % patch Apply to painful area topically 2 times a day - 12 hours on and 12 hours off       loperamide (IMODIUM A-D) 2 MG tablet Take 2 mg by mouth daily as needed Give 4mg with first loose stool AND Give 2 mg by mouth as needed for Loose Stools 2 mg with each subsequent loose stool episode after initial 4 mg first dose. NOT TO EXCEED 16 MG IN 24       magnesium oxide 400 MG CAPS Take 400 mg by mouth 2 times daily       MELATONIN PO Take 10 mg by mouth At Bedtime        metoprolol succinate (TOPROL-XL) 50 MG 24 hr tablet Take 50 mg by mouth daily        multivitamin w/minerals (THERA-VIT-M) tablet Take 1 tablet by mouth daily       Nutritional Supplements (NUTRITIONAL SUPPLEMENT PO) Take by mouth At Bedtime Snack at  hs.       omeprazole 20 MG tablet Take 1 tablet (20 mg) by mouth 2 times daily 180 tablet 3     OTHER MEDICAL SUPPLIES every morning Daily weights.       OTHER MEDICAL SUPPLIES 4 times daily BP checks qid.       OTHER MEDICAL SUPPLIES Legs: Black socks and Compriflex LEs. Thigh high wraps. Leave on at all times. If soiled may remove thigh portions.    every shift       polyethylene glycol (MIRALAX/GLYCOLAX) packet Take 1 packet by mouth daily as needed for constipation       potassium chloride ER (K-DUR/KLOR-CON M) 20 MEQ CR tablet Take 40 mEq by mouth 2 times daily        ranitidine (ZANTAC) 75 MG tablet Take 75 mg by mouth 2 times daily        senna-docusate (SENOKOT-S/PERICOLACE) 8.6-50 MG tablet Take 1 tablet by mouth 2 times daily       TRAZODONE HCL PO Take 100 mg by mouth At Bedtime        cephalexin 250 MG TABS Take 250 mg by mouth 4 times daily       DULOXETINE HCL PO Take 30 mg by mouth daily       medical cannabis (Patient's own supply.  Not a prescription) 1 Units Patient report she takes at home.  Not taking at TCU         Medication Changes/Rationale:     See above    Controlled medications  sent with patient:        ROS:   Deferred--to cardiology clinic    Physical Exam:   Vitals: /43   Pulse 61   Temp 98.9  F (37.2  C)   Resp 16   Ht 1.524 m (5')   Wt 49.2 kg (108 lb 8 oz)   SpO2 98%   BMI 21.19 kg/m     BMI= Body mass index is 21.19 kg/m .  Deferred-to cardiology clinic         DISCHARGE PLAN:    Follow up labs: No labs orders/due    Medical Follow Up:      Follow up with primary care provider in 1-2 weeks    MTM referral needed and placed by this provider: No, home care to follow    Discharge Services: Home Care:  Occupational Therapy, Physical Therapy, Registered Nurse and Home Health Aide            Face to Face and Medical Necessity Statement for DME Provider visit    Demographic Information on Minerva Blanco:  Gender: female  : 1945  [address]   [place] [address]  [phone] (home)     Medical Record: [id number]  Social Security Number: [id number]  Primary Care Provider: Andrea Nino  Insurance: Payor: MEDICARE / Plan: MEDICARE FOR HB SUPPLEMENT / Product Type: Medicare /     HPI:   Minerva Blanco is a 74 year old  (1945), who is being seen today for a face to face provider visit at [facility]; medical necessity statement for DME included. This patient requires the following:  DME Ordered and Medical Necessity Statement   Hospital bed: fully electronic with 2 half side rails    1. Does the patient require positioning of the body in ways not feasible with an ordinary bed due to a medical condition that is expected to last at least 1 month? Yes    Pain secondary to T12 and L1 Compression fracture s/p posterior fusion T9-pelvis with laminectomy T11-L1, hardware removal L5-S1 s/p stage 2 ASF T12-L2 with corpectomy L1, chronic pain syndrome.Shortness of breath secondary to congestive heart failure.    2. Does the patient require, for the alleviation of pain, positioning of the body in ways not feasible with an ordinary bed?  Yes  Pain secondary to T12 and L1 Compression fracture s/p posterior fusion T9-pelvis with laminectomy T11-L1, hardware removal L5-S1 s/p stage 2 ASF T12-L2 with corpectomy L1, chronic pain syndrome.    3. Does the patient require the head of bed to be elevated more than 30 degrees most of the time due to congestive heart failure, chronic pulmonary disease or aspiration? Yes  Shortness of breath secondary to congestive heart failure, lymphedema, wheeping of peripheral extremities.    4. Does the patient require traction that can only be attached to a hospital bed?  NA      5. Does the patient require a bed height different than a fixed height hospital bed to permit transfers to a chair, wheelchair, or standing position? Yes    Pain secondary to T12 and L1 Compression fracture s/p posterior fusion T9-pelvis with laminectomy T11-L1, hardware removal L5-S1 s/p stage 2 ASF T12-L2 with corpectomy L1, chronic pain syndrome.   ROM, bending and weight restrictions    6. Does the patient require frequent changes in body position and/or have an immediate need for change in body position? Yes    Pain secondary to T12 and L1 Compression fracture s/p posterior fusion T9-pelvis with laminectomy T11-L1, hardware removal L5-S1 s/p stage 2 ASF T12-L2 with corpectomy L1, chronic pain syndrome.    High risk for development of pressure injuries d/t impaired mobility, malnutrition, Lymphedema, limited movement r/t Pain secondary to T12 and L1 Compression fracture s/p posterior fusion T9-pelvis with laminectomy T11-L1, hardware removal L5-S1 s/p stage 2 ASF T12-L2 with corpectomy L1, chronic pain syndrome.      Pt needing above DME with expected length of need of 12   months  due to medical necessity associated with following diagnosis:     Closed compression fracture of L1 lumbar vertebra with routine healing, subsequent encounter  Spinal stenosis of lumbar region with neurogenic claudication  S/P lumbar laminectomy  Brachial artery  thrombosis (H)  Postoperative anemia due to acute blood loss  Lymphedema  Acute diastolic heart failure (H)  Coronary artery disease involving native coronary artery of native heart without angina pectoris  Essential hypertension  Hypokalemia  Hx of esophagectomy  Adjustment disorder with anxious mood  Insomnia, unspecified type  Irritable bowel syndrome with both constipation and diarrhea  Moderate protein-calorie malnutrition (H)  Cellulitis of toe of right foot  Drug-induced constipation  MS (multiple sclerosis) (H)      PMH   has a past medical history of Arrhythmia, Atrial fibrillation (H), Breast cancer (H) (2007), Chronic infection, Esophageal perforation, Fibromyalgia, GERD (gastroesophageal reflux disease), Hiatal hernia, History of blood transfusion, IBS (irritable bowel syndrome), Meniere's disease, MS (multiple sclerosis) (H), Multiple sclerosis (H), Noninfectious ileitis, and Other chronic pain. She also has no past medical history of Anemia, Diabetes (H), or PONV (postoperative nausea and vomiting).    ROS  see above    EXAM  Vitals: /43   Pulse 61   Temp 98.9  F (37.2  C)   Resp 16   Ht 1.524 m (5')   Wt 49.2 kg (108 lb 8 oz)   SpO2 98%   BMI 21.19 kg/m   ;BMI= Body mass index is 21.19 kg/m .  See above     ASSESSMENT/PLAN:  1. Closed compression fracture of L1 lumbar vertebra with routine healing, subsequent encounter    2. Spinal stenosis of lumbar region with neurogenic claudication    3. S/P lumbar laminectomy    4. Brachial artery thrombosis (H)    5. Postoperative anemia due to acute blood loss    6. Lymphedema    7. Acute diastolic heart failure (H)    8. Coronary artery disease involving native coronary artery of native heart without angina pectoris    9. Essential hypertension    10. Hypokalemia    11. Hx of esophagectomy    12. Adjustment disorder with anxious mood    13. Insomnia, unspecified type    14. Irritable bowel syndrome with both constipation and diarrhea    15.  Moderate protein-calorie malnutrition (H)    16. Cellulitis of toe of right foot    17. Drug-induced constipation    18. MS (multiple sclerosis) (H)        Orders:  1. Facility staff/TC to contact [employer] to get their order form for provider to fill out    ELECTRONICALLY SIGNED BY YINKA CERTIFIED PROVIDER:  CLIVE Mcguire CNP   NPI: [id number]  [facility]  [address] [place] [address], [address]  [place] [address]           Documentation of Face-to-Face and Certification for Home Health Services     Patient: Minerva Blanco   YOB: 1945  MR Number: [id number]  Today's Date: 6/5/2019    I certify that patient: Minerva Blanco is under my care and that I  had a face-to-face encounter that meets the physician face-to-face encounter requirements with this patient on: 6/5/2019.    This encounter with the patient was in whole, or in part, for the following medical condition, which is the primary reason for home health care:     ICD-10-CM    1. Closed compression fracture of L1 lumbar vertebra with routine healing, subsequent encounter S32.010D    2. Spinal stenosis of lumbar region with neurogenic claudication M48.062    3. S/P lumbar laminectomy Z98.890    4. Brachial artery thrombosis (H) I74.2    5. Postoperative anemia due to acute blood loss D62    6. Lymphedema I89.0    7. Acute diastolic heart failure (H) I50.31    8. Coronary artery disease involving native coronary artery of native heart without angina pectoris I25.10    9. Essential hypertension I10    10. Hypokalemia E87.6    11. Hx of esophagectomy Z98.890     Z90.49    12. Adjustment disorder with anxious mood F43.22    13. Insomnia, unspecified type G47.00    14. Irritable bowel syndrome with both constipation and diarrhea K58.2    15. Moderate protein-calorie malnutrition (H) E44.0    16. Cellulitis of toe of right foot L03.031    17. Drug-induced constipation K59.03    18. MS (multiple sclerosis) (H) G35      I certify  that, based on my findings, the following services are medically necessary home health services: Nursing, Occupational Therapy, Physical Therapy and Lymphedema, HHA.    My clinical findings support the need for the above services because: Nurse is needed: To assess cardio-pulmonary status, post op, pain managment, mental health after changes in medications or other medical regimen.., Occupational Therapy Services are needed to assess and treat cognitive ability and address ADL safety due to impairment in ADLs, lymphedema. and Physical Therapy Services are needed to assess and treat the following functional impairments: Ambulation, safety, lymphedema.    Further, I certify that my clinical findings support that this patient is homebound (i.e. absences from home require considerable and taxing effort and are for medical reasons or Holiness services or infrequently or of short duration when for other reasons) because: Requires assistance of another person or specialized equipment to access medical services because patient: Requires supervision of another for safe transfer...    Based on the above findings. I certify that this patient is confined to the home and needs intermittent skilled nursing care, physical therapy and/or speech therapy.  The patient is under my care, and I have initiated the establishment of the plan of care.  This patient will be followed by a physician who will periodically review the plan of care.  Physician/Provider to provide follow up care: Andrea Nino    Responsible Medicare certified PECOS Physician: YVES Mcguire  Date: 6/5/2019      Note--discharge visit not completed. Patient requested cardiology visit    TOTAL DISCHARGE TIME:     Electronically signed by:  CLIVE Mcguire CNP                       Sincerely,        CLIVE Mcguire CNP

## 2019-06-05 NOTE — PROGRESS NOTES
[facility] DISCHARGE SUMMARY  PATIENT'S NAME: Minerva Blanco  YOB: 1945  MEDICAL RECORD NUMBER:  [id number]  Place of Service where encounter took place:  [facility] ([facility]) [[id number]]    PRIMARY CARE PROVIDER AND CLINIC RESPONSIBLE AFTER TRANSFER:   Andrea Nino MD, [facility] [address] / [facility] [address]    Non-[facility] Provider     Transferring providers: CLIVE Mcguire CNP, Dr. Juan MD  Recent Hospitalization/ED:  Hospital  [facility] stay 4/22/2019 to 5/7/2019.  Date of SNF Admission: May / 08 / 2019  Date of SNF (anticipated) Discharge: June / 07 / 2019  Discharged to: with family home  Cognitive Scores: BIMS: 15/15 , SLUMS 22/30  Physical Function: ambulating 50' wtih RW with CGA/SBA  DME: Wheelchair, hospital bed and DME F2F done 6-5-19    CODE STATUS/ADVANCE DIRECTIVES DISCUSSION:  Full Code   ALLERGIES: Amoxicillin; Ativan [lorazepam]; and Morphine    DISCHARGE DIAGNOSIS/NURSING FACILITY COURSE:   Closed compression fracture of L1 lumbar vertebra with routine healing, subsequent encounter  Spinal stenosis of lumbar region with neurogenic claudication  S/P lumbar laminectomy  Pain management has been challenging.  Currently pain managed with gabapentin 400 mg twice daily, Cymbalta 30 mg daily.   Has Dilaudid 2 mg every 3 hours as needed (still using consistently 2-3 x/day)   and Tylenol as needed.  Aspirin 325 mg for DVT prophylaxis  Started vitamin D and Calcium  External Bone growth stimulator recommended--coordinated through ortho  Wearing TLSO  Participated in therapies, however was self limiting          Brachial artery thrombosis (H)  Continue with aspirin 325 mg, monitor site for healing     Postoperative anemia due to acute blood loss  Stable at low to mid 8's.  On iron supplements      Lymphedema  All extremities and to abdomen  Possibly related to volume overload 2/2 IVF hydration, HF. Has been extensive and challenging to  manage.  3 diuretic bursts, in creased diuretic base, daily lymphedema, then every other day.     Acute diastolic heart failure  Coronary artery disease involving native coronary artery of native heart without angina pectoris  EF 65% at hospital with no WIMA, normal LVF  Status post PCI proximal mid RCA.  2 stent placements, inferior MI last year  Continues with aspirin 325 daily metoprolol 50 mg daily, Lipitor 80 mg daily  On fluid and salt restriction  CXR showed stable pleural effusion (and fx left 7th rib)  Follows with cardiology     Essential hypertension  Managed with metoprolol  Blood pressures acceptable range.  On the soft side. Diuretics added  Blood pressures:  2019 07:22 116/43 mmHg (Sitting r/arm)  2019 19:29 94/44 mmHg (Lying r/arm)  2019 16:57 89/42 mmHg (Lying r/arm)  2019 13:05 116/68 mmHg (Sitting r/arm)  2019 10:29 95/52 mmHg  2019 08:08 95/52 mmHg (Sitting r/arm)  2019 02:26 112/66 mmHg (Lying r/arm)     Hypokalemia  With chronic diarrhea  K+ stable at 3.6-3.7  Diarrhea, diuretics  Continues on Kcl 20mEq     Hx of esophagectomy  Long history with esophageal perforation status post gastric surgery.  Has been chronically on high dose of omeprazole 40 mg twice daily.  Discharge from hospital with 20 mg twice daily.  Discussed with patient's the risks of high-dose omeprazole including risk for C. difficile.  We  continued on 20 mg twice daily, and Zantac.  patient asymptomatic     Adjustment Disorder  Insomnia, unspecified type  Has had multiple medical complications in the past 3 years including multiple GI surgeries to feedings cardiac issues and now the back surgery.  In addition her   in September he was her primary caregiver.  Visited with in house psych and    Continues with trazodone 100 mg nightly and melatonin 3 mg nightly to facilitate sleep    IBS  Drug induced constipation  Patient has  History of diarrhea related to IBS,  treated with loperimide;  however, developed constipation required miralax, suppository and digital removal. Started on senna PRN    Malnutrition  Poor appetite, on supplements. Weight 108# (down from 112#, had been down to 103, possibly fluid related)    Cellulitis right toe  Treated with Keflex x 10 days      Multiple Sclerosis  Bilateral foot drop.  AFOs have not fit due to severe edema.  Referred to Orthotist    Past Medical History:  has a past medical history of Arrhythmia, Atrial fibrillation (H), Breast cancer (H) (2007), Chronic infection, Esophageal perforation, Fibromyalgia, GERD (gastroesophageal reflux disease), Hiatal hernia, History of blood transfusion, IBS (irritable bowel syndrome), Meniere's disease, MS (multiple sclerosis) (H), Multiple sclerosis (H), Noninfectious ileitis, and Other chronic pain. She also has no past medical history of Anemia, Diabetes (H), or PONV (postoperative nausea and vomiting).    Discharge Medications:  Current Outpatient Medications   Medication Sig Dispense Refill     Acetaminophen (TYLENOL EXTRA STRENGTH PO) Take 1,000 mg by mouth 3 times daily And 500 mg by mouth 2 times daily as needed       aspirin (ASA) 325 MG EC tablet Take 325 mg by mouth daily Give with meal       atorvastatin (LIPITOR) 80 MG tablet Take 80 mg by mouth daily        benzonatate (TESSALON) 100 MG capsule Take 100 mg by mouth 3 times daily as needed for cough       Calcium Carb-Cholecalciferol (OS-ANNABELLE PO) Take 1 tablet by mouth 3 times daily (with meals)       DULoxetine (CYMBALTA) 30 MG capsule Take 30 mg by mouth daily       ferrous sulfate (FEROSUL) 325 (65 Fe) MG tablet Take 325 mg by mouth daily (with breakfast)       furosemide (LASIX) 20 MG tablet Take 40 mg by mouth 2 times daily        gabapentin (NEURONTIN) 400 MG capsule Take 400 mg by mouth 2 times daily       HYDROmorphone (DILAUDID) 2 MG tablet Take 2 mg by mouth 4 times daily as needed for severe pain        lidocaine (LIDODERM) 5 %  patch Apply to painful area topically 2 times a day - 12 hours on and 12 hours off       loperamide (IMODIUM A-D) 2 MG tablet Take 2 mg by mouth daily as needed Give 4mg with first loose stool AND Give 2 mg by mouth as needed for Loose Stools 2 mg with each subsequent loose stool episode after initial 4 mg first dose. NOT TO EXCEED 16 MG IN 24       magnesium oxide 400 MG CAPS Take 400 mg by mouth 2 times daily       MELATONIN PO Take 10 mg by mouth At Bedtime        metoprolol succinate (TOPROL-XL) 50 MG 24 hr tablet Take 50 mg by mouth daily        multivitamin w/minerals (THERA-VIT-M) tablet Take 1 tablet by mouth daily       Nutritional Supplements (NUTRITIONAL SUPPLEMENT PO) Take by mouth At Bedtime Snack at  hs.       omeprazole 20 MG tablet Take 1 tablet (20 mg) by mouth 2 times daily 180 tablet 3     OTHER MEDICAL SUPPLIES every morning Daily weights.       OTHER MEDICAL SUPPLIES 4 times daily BP checks qid.       OTHER MEDICAL SUPPLIES Legs: Black socks and Compriflex LEs. Thigh high wraps. Leave on at all times. If soiled may remove thigh portions.    every shift       polyethylene glycol (MIRALAX/GLYCOLAX) packet Take 1 packet by mouth daily as needed for constipation       potassium chloride ER (K-DUR/KLOR-CON M) 20 MEQ CR tablet Take 40 mEq by mouth 2 times daily        ranitidine (ZANTAC) 75 MG tablet Take 75 mg by mouth 2 times daily        senna-docusate (SENOKOT-S/PERICOLACE) 8.6-50 MG tablet Take 1 tablet by mouth 2 times daily       TRAZODONE HCL PO Take 100 mg by mouth At Bedtime        cephalexin 250 MG TABS Take 250 mg by mouth 4 times daily       DULOXETINE HCL PO Take 30 mg by mouth daily       medical cannabis (Patient's own supply.  Not a prescription) 1 Units Patient report she takes at home.  Not taking at TCU         Medication Changes/Rationale:     See above    Controlled medications sent with patient:        ROS:   Deferred--to cardiology clinic    Physical Exam:   Vitals: /43    Pulse 61   Temp 98.9  F (37.2  C)   Resp 16   Ht 1.524 m (5')   Wt 49.2 kg (108 lb 8 oz)   SpO2 98%   BMI 21.19 kg/m    BMI= Body mass index is 21.19 kg/m .  Deferred-to cardiology clinic         DISCHARGE PLAN:    Follow up labs: No labs orders/due    Medical Follow Up:      Follow up with primary care provider in 1-2 weeks    MTM referral needed and placed by this provider: No, home care to follow    Discharge Services: Home Care:  Occupational Therapy, Physical Therapy, Registered Nurse and Home Health Aide            Face to Face and Medical Necessity Statement for DME Provider visit    Demographic Information on Minerva Blanco:  Gender: female  : 1945  [address]   [place] [address]  [phone] (home)     Medical Record: [id number]  Social Security Number: [id number]  Primary Care Provider: Andrea Nino  Insurance: Payor: MEDICARE / Plan: MEDICARE FOR HB SUPPLEMENT / Product Type: Medicare /     HPI:   Minerva Blanco is a 74 year old  (1945), who is being seen today for a face to face provider visit at [facility]; medical necessity statement for DME included. This patient requires the following:  DME Ordered and Medical Necessity Statement   Hospital bed: fully electronic with 2 half side rails    1. Does the patient require positioning of the body in ways not feasible with an ordinary bed due to a medical condition that is expected to last at least 1 month? Yes    Pain secondary to T12 and L1 Compression fracture s/p posterior fusion T9-pelvis with laminectomy T11-L1, hardware removal L5-S1 s/p stage 2 ASF T12-L2 with corpectomy L1, chronic pain syndrome.Shortness of breath secondary to congestive heart failure.    2. Does the patient require, for the alleviation of pain, positioning of the body in ways not feasible with an ordinary bed? Yes  Pain secondary to T12 and L1 Compression fracture s/p posterior fusion T9-pelvis with laminectomy  T11-L1, hardware removal L5-S1 s/p stage 2 ASF T12-L2 with corpectomy L1, chronic pain syndrome.    3. Does the patient require the head of bed to be elevated more than 30 degrees most of the time due to congestive heart failure, chronic pulmonary disease or aspiration? Yes  Shortness of breath secondary to congestive heart failure, lymphedema, wheeping of peripheral extremities.    4. Does the patient require traction that can only be attached to a hospital bed?  NA      5. Does the patient require a bed height different than a fixed height hospital bed to permit transfers to a chair, wheelchair, or standing position? Yes    Pain secondary to T12 and L1 Compression fracture s/p posterior fusion T9-pelvis with laminectomy T11-L1, hardware removal L5-S1 s/p stage 2 ASF T12-L2 with corpectomy L1, chronic pain syndrome.   ROM, bending and weight restrictions    6. Does the patient require frequent changes in body position and/or have an immediate need for change in body position? Yes    Pain secondary to T12 and L1 Compression fracture s/p posterior fusion T9-pelvis with laminectomy T11-L1, hardware removal L5-S1 s/p stage 2 ASF T12-L2 with corpectomy L1, chronic pain syndrome.    High risk for development of pressure injuries d/t impaired mobility, malnutrition, Lymphedema, limited movement r/t Pain secondary to T12 and L1 Compression fracture s/p posterior fusion T9-pelvis with laminectomy T11-L1, hardware removal L5-S1 s/p stage 2 ASF T12-L2 with corpectomy L1, chronic pain syndrome.      Pt needing above DME with expected length of need of 12   months  due to medical necessity associated with following diagnosis:     Closed compression fracture of L1 lumbar vertebra with routine healing, subsequent encounter  Spinal stenosis of lumbar region with neurogenic claudication  S/P lumbar laminectomy  Brachial artery thrombosis (H)  Postoperative anemia due to acute blood loss  Lymphedema  Acute diastolic heart failure  (H)  Coronary artery disease involving native coronary artery of native heart without angina pectoris  Essential hypertension  Hypokalemia  Hx of esophagectomy  Adjustment disorder with anxious mood  Insomnia, unspecified type  Irritable bowel syndrome with both constipation and diarrhea  Moderate protein-calorie malnutrition (H)  Cellulitis of toe of right foot  Drug-induced constipation  MS (multiple sclerosis) (H)      PMH   has a past medical history of Arrhythmia, Atrial fibrillation (H), Breast cancer (H) (2007), Chronic infection, Esophageal perforation, Fibromyalgia, GERD (gastroesophageal reflux disease), Hiatal hernia, History of blood transfusion, IBS (irritable bowel syndrome), Meniere's disease, MS (multiple sclerosis) (H), Multiple sclerosis (H), Noninfectious ileitis, and Other chronic pain. She also has no past medical history of Anemia, Diabetes (H), or PONV (postoperative nausea and vomiting).    ROS  see above    EXAM  Vitals: /43   Pulse 61   Temp 98.9  F (37.2  C)   Resp 16   Ht 1.524 m (5')   Wt 49.2 kg (108 lb 8 oz)   SpO2 98%   BMI 21.19 kg/m  ;BMI= Body mass index is 21.19 kg/m .  See above     ASSESSMENT/PLAN:  1. Closed compression fracture of L1 lumbar vertebra with routine healing, subsequent encounter    2. Spinal stenosis of lumbar region with neurogenic claudication    3. S/P lumbar laminectomy    4. Brachial artery thrombosis (H)    5. Postoperative anemia due to acute blood loss    6. Lymphedema    7. Acute diastolic heart failure (H)    8. Coronary artery disease involving native coronary artery of native heart without angina pectoris    9. Essential hypertension    10. Hypokalemia    11. Hx of esophagectomy    12. Adjustment disorder with anxious mood    13. Insomnia, unspecified type    14. Irritable bowel syndrome with both constipation and diarrhea    15. Moderate protein-calorie malnutrition (H)    16. Cellulitis of toe of right foot    17. Drug-induced  constipation    18. MS (multiple sclerosis) (H)        Orders:  1. Facility staff/TC to contact [employer] to get their order form for provider to fill out    ELECTRONICALLY SIGNED BY YINKA CERTIFIED PROVIDER:  CLIVE Mcguire CNP   NPI: [id number]  [facility]  [address] [place] [address], [address]  [place] [address]           Documentation of Face-to-Face and Certification for Home Health Services     Patient: Minerva Blanco   YOB: 1945  MR Number: [id number]  Today's Date: 6/5/2019    I certify that patient: Minerva Blanco is under my care and that I  had a face-to-face encounter that meets the physician face-to-face encounter requirements with this patient on: 6/5/2019.    This encounter with the patient was in whole, or in part, for the following medical condition, which is the primary reason for home health care:     ICD-10-CM    1. Closed compression fracture of L1 lumbar vertebra with routine healing, subsequent encounter S32.010D    2. Spinal stenosis of lumbar region with neurogenic claudication M48.062    3. S/P lumbar laminectomy Z98.890    4. Brachial artery thrombosis (H) I74.2    5. Postoperative anemia due to acute blood loss D62    6. Lymphedema I89.0    7. Acute diastolic heart failure (H) I50.31    8. Coronary artery disease involving native coronary artery of native heart without angina pectoris I25.10    9. Essential hypertension I10    10. Hypokalemia E87.6    11. Hx of esophagectomy Z98.890     Z90.49    12. Adjustment disorder with anxious mood F43.22    13. Insomnia, unspecified type G47.00    14. Irritable bowel syndrome with both constipation and diarrhea K58.2    15. Moderate protein-calorie malnutrition (H) E44.0    16. Cellulitis of toe of right foot L03.031    17. Drug-induced constipation K59.03    18. MS (multiple sclerosis) (H) G35      I certify that, based on my findings, the following services are medically necessary home health services:  Nursing, Occupational Therapy, Physical Therapy and Lymphedema, HHA.    My clinical findings support the need for the above services because: Nurse is needed: To assess cardio-pulmonary status, post op, pain managment, mental health after changes in medications or other medical regimen.., Occupational Therapy Services are needed to assess and treat cognitive ability and address ADL safety due to impairment in ADLs, lymphedema. and Physical Therapy Services are needed to assess and treat the following functional impairments: Ambulation, safety, lymphedema.    Further, I certify that my clinical findings support that this patient is homebound (i.e. absences from home require considerable and taxing effort and are for medical reasons or Christian services or infrequently or of short duration when for other reasons) because: Requires assistance of another person or specialized equipment to access medical services because patient: Requires supervision of another for safe transfer...    Based on the above findings. I certify that this patient is confined to the home and needs intermittent skilled nursing care, physical therapy and/or speech therapy.  The patient is under my care, and I have initiated the establishment of the plan of care.  This patient will be followed by a physician who will periodically review the plan of care.  Physician/Provider to provide follow up care: Andrea Nino    Responsible Medicare certified PECOS Physician: JOON Mcguire-BC  Date: 6/5/2019      Note--discharge visit not completed. Patient requested cardiology visit    TOTAL DISCHARGE TIME:     Electronically signed by:  CLIVE Mcguire CNP

## 2019-06-07 ENCOUNTER — AMBULATORY - HEALTHEAST (OUTPATIENT)
Dept: OTHER | Facility: CLINIC | Age: 74
End: 2019-06-07

## 2019-06-07 ENCOUNTER — DOCUMENTATION ONLY (OUTPATIENT)
Dept: OTHER | Facility: CLINIC | Age: 74
End: 2019-06-07

## 2019-06-12 ENCOUNTER — NURSING HOME VISIT (OUTPATIENT)
Dept: GERIATRICS | Facility: CLINIC | Age: 74
End: 2019-06-12

## 2019-06-12 ENCOUNTER — TELEPHONE (OUTPATIENT)
Dept: GERIATRICS | Facility: CLINIC | Age: 74
End: 2019-06-12

## 2019-06-12 VITALS
WEIGHT: 102.4 LBS | RESPIRATION RATE: 16 BRPM | BODY MASS INDEX: 20.1 KG/M2 | SYSTOLIC BLOOD PRESSURE: 124 MMHG | OXYGEN SATURATION: 97 % | DIASTOLIC BLOOD PRESSURE: 68 MMHG | HEIGHT: 60 IN | TEMPERATURE: 97.8 F | HEART RATE: 78 BPM

## 2019-06-12 DIAGNOSIS — R33.9 URINARY RETENTION WITH INCOMPLETE BLADDER EMPTYING: ICD-10-CM

## 2019-06-12 DIAGNOSIS — I50.31 ACUTE DIASTOLIC HEART FAILURE (H): Primary | ICD-10-CM

## 2019-06-12 DIAGNOSIS — S32.010D CLOSED COMPRESSION FRACTURE OF L1 LUMBAR VERTEBRA WITH ROUTINE HEALING, SUBSEQUENT ENCOUNTER: ICD-10-CM

## 2019-06-12 DIAGNOSIS — F33.1 MODERATE EPISODE OF RECURRENT MAJOR DEPRESSIVE DISORDER (H): ICD-10-CM

## 2019-06-12 DIAGNOSIS — E44.0 MODERATE PROTEIN-CALORIE MALNUTRITION (H): ICD-10-CM

## 2019-06-12 DIAGNOSIS — E86.1 HYPOTENSION DUE TO HYPOVOLEMIA: ICD-10-CM

## 2019-06-12 DIAGNOSIS — M48.062 SPINAL STENOSIS OF LUMBAR REGION WITH NEUROGENIC CLAUDICATION: ICD-10-CM

## 2019-06-12 DIAGNOSIS — D62 POSTOPERATIVE ANEMIA DUE TO ACUTE BLOOD LOSS: ICD-10-CM

## 2019-06-12 DIAGNOSIS — G47.00 INSOMNIA, UNSPECIFIED TYPE: ICD-10-CM

## 2019-06-12 DIAGNOSIS — I74.2 BRACHIAL ARTERY THROMBOSIS (H): ICD-10-CM

## 2019-06-12 DIAGNOSIS — F43.22 ADJUSTMENT DISORDER WITH ANXIOUS MOOD: ICD-10-CM

## 2019-06-12 DIAGNOSIS — Z98.890 S/P LUMBAR LAMINECTOMY: ICD-10-CM

## 2019-06-12 DIAGNOSIS — I89.0 LYMPHEDEMA: ICD-10-CM

## 2019-06-12 PROCEDURE — 99310 SBSQ NF CARE HIGH MDM 45: CPT | Performed by: NURSE PRACTITIONER

## 2019-06-12 RX ORDER — ASPIRIN 81 MG/1
81 TABLET ORAL DAILY
COMMUNITY
End: 2020-01-01

## 2019-06-12 ASSESSMENT — MIFFLIN-ST. JEOR: SCORE: 890.98

## 2019-06-12 NOTE — LETTER
6/12/2019        RE: Minerva Blanco  [address]  [place] [address]        [facility]  PRIMARY CARE PROVIDER AND CLINIC:  Andrea Nino MD, [facility] [address] [facility] [address] / [facility] [address]  Chief Complaint   Patient presents with     Hospital F/U     [facility] Record Number:  [id number]  Place of Service where encounter took place:  [facility] ([facility]) [[id number]]    Minerva Blnaco  is a 74 year old  (1945), admitted to the above facility from  [facility]. Hospital stay 6/5/2019 through 6/11/2019..  Admitted to this facility for  rehab, medical management and nursing care.    HPI:    HPI information obtained from: facility chart records, facility staff, patient report and Care Everywhere Epic chart review.   Brief Summary of Hospital Course:   74-year-old female with a past medical history significant for congestive heart failure, hypertension, significant degenerative spine disease status post vertebroplasty with 2 stages procedure of posterior fusion of T12/L1, GERD, DVT of left arm status post thrombectomy who had previously been in this TCU for recovery after spine surgery.  Was rehospitalized as edema was not progressing.  Rehospitalized for acute on chronic CHF, diastolic dysfunction and bilateral leg lymphedema.  Also developed gross hematuria and urinary retention with unknown etiology.  Required Whitt catheter.  Self-catheterization was recommended by urology but was too physically difficult to do as patient's is wearing a back brace due to surgery.  UA and UC were negative.  Albumin was quite low.  Received IV albumin.  Level back up to 2.6.  Also had a hypoglycemic event.  Started on supplements.  Patient had right upper quadrant abdominal pain off and on.  Liver ultrasound showed normal gallbladder with biliary dilatation and possible cholelithiasis.  Elevated alk phos but unremarkable other LFTs.  GI consulted.  Status  post MRCP which was negative for Tanya no cholelithiasis.  GI indicated no further work-up necessary.    Updates on Status Since Skilled nursing Admission:   Continues to have difficulties with urination.  Retaining anywhere from 1-300 post void.  Patient is very resistant to catheterization.  Denies hematuria.  Denies issues with bowels.  Denies chest pain or shortness of breath.  Appetite fair.      CODE STATUS/ADVANCE DIRECTIVES DISCUSSION:   CPR/Full code   Patient's living condition: lives alone  ALLERGIES: Amoxicillin; Ativan [lorazepam]; and Morphine  PAST MEDICAL HISTORY:  has a past medical history of Arrhythmia, Atrial fibrillation (H), Breast cancer (H) (2007), Chronic infection, Esophageal perforation, Fibromyalgia, GERD (gastroesophageal reflux disease), Hiatal hernia, History of blood transfusion, IBS (irritable bowel syndrome), Meniere's disease, MS (multiple sclerosis) (H), Multiple sclerosis (H), Noninfectious ileitis, and Other chronic pain. She also has no past medical history of Anemia, Diabetes (H), or PONV (postoperative nausea and vomiting).  PAST SURGICAL HISTORY:   has a past surgical history that includes Esophagoscopy, gastroscopy, duodenoscopy (EGD), combined (N/A, 4/18/2016); Pharyngostomy (N/A, 4/18/2016); Esophagoscopy, gastroscopy, duodenoscopy (EGD), combined (N/A, 4/25/2016); Pharyngostomy (N/A, 4/25/2016); appendectomy; Thoracotomy (Right, 1998); back surgery (5/1/2015); Wrist surgery; Nissen fundoplication (1/2016); GASTROSTOMY/JEJUN TUBE; Esophagoscopy, gastroscopy, duodenoscopy (EGD), combined (N/A, 5/4/2016); Pharyngostomy (N/A, 5/4/2016); Esophagoscopy, gastroscopy, duodenoscopy (EGD), combined (N/A, 5/18/2016); Esophagoscopy, gastroscopy, duodenoscopy (EGD), combined (N/A, 6/22/2016); Pharyngostomy (N/A, 6/22/2016); Esophagoscopy, gastroscopy, duodenoscopy (EGD), dilatation, combined (N/A, 6/29/2016); Irrigation and debridement chest washout, combined (N/A, 6/29/2016);  Pharyngostomy (N/A, 6/29/2016); UGI ENDOSCOPY W TRANSENDOSCOPIC STENT PLACEMENT (N/A, 7/12/2016); Esophagoscopy, gastroscopy, duodenoscopy (EGD), combined (N/A, 7/12/2016); UGI ENDOSCOPY W TRANSENDOSCOPIC STENT PLACEMENT (N/A, 7/22/2016); picc insertion (Left, 8/25/2016); Combined Esophagoscopy, Gastroscopy, Duodenoscopy (EGD), Remove Esophageal Stent (N/A, 8/26/2016); Nerve block peripheral (N/A, 8/30/2016); Lumpectomy breast (Right, 2007); Laparoscopy diagnostic (general) (N/A, 1/9/2017); Thoracotomy (Left, 1/9/2017); Esophagectomy (N/A, 1/9/2017); Create spit fistula (N/A, 1/9/2017); Laparoscopy diagnostic (general) (N/A, 4/17/2017); Close spit fistula (N/A, 4/17/2017); Laparoscopic assisted insertion tube jejunostomy (N/A, 4/17/2017); Bronchoscopy flexible (N/A, 4/17/2017); Esophagoscopy, gastroscopy, duodenoscopy (EGD), combined (N/A, 4/21/2017); Pharyngostomy (N/A, 4/21/2017); Esophagoscopy, gastroscopy, duodenoscopy (EGD), combined (N/A, 5/19/2017); Explore neck (N/A, 5/19/2017); Irrigation and debridement chest washout, combined (N/A, 5/23/2017); Esophagoscopy, gastroscopy, duodenoscopy (EGD), combined (N/A, 5/23/2017); Irrigation and debridement chest washout, combined (N/A, 5/24/2017); Irrigation and debridement chest washout, combined (N/A, 5/25/2017); Irrigation and debridement chest washout, combined (N/A, 5/26/2017); Incision and drainage chest washout, combined (N/A, 5/27/2017); Incision and drainage chest washout, combined (N/A, 5/28/2017); Irrigation and debridement chest washout, combined (N/A, 5/30/2017); PICC INSERTION - Rewire (Right, 05/31/2017); Irrigation and debridement chest washout, combined (N/A, 5/31/2017); Pharyngostomy (Left, 5/31/2017); Irrigation and debridement chest washout, combined (N/A, 6/1/2017); Irrigation and debridement chest washout, combined (N/A, 6/4/2017); Irrigation and debridement chest washout, combined (N/A, 6/2/2017); Incision and drainage chest washout, combined  (N/A, 6/9/2017); Esophagoscopy, gastroscopy, duodenoscopy (EGD), combined (N/A, 6/27/2017); Incision and drainage chest washout, combined (N/A, 7/17/2017); Esophagoscopy, gastroscopy, duodenoscopy (EGD), combined (N/A, 8/4/2017); Esophagoscopy, gastroscopy, duodenoscopy (EGD), combined (N/A, 8/25/2017); Combined Esophagoscopy, Gastroscopy, Duodenoscopy (EGD), Replace Esophageal Stent (N/A, 8/25/2017); Combined Esophagoscopy, Gastroscopy, Duodenoscopy (EGD), Replace Esophageal Stent (N/A, 9/15/2017); Repair fistula tracheoesophageal (N/A, 9/15/2017); Combined Esophagoscopy, Gastroscopy, Duodenoscopy (EGD), Replace Esophageal Stent (N/A, 10/20/2017); Esophagoscopy, gastroscopy, duodenoscopy (EGD), combined (N/A, 10/2/2017); Combined Esophagoscopy, Gastroscopy, Duodenoscopy (EGD), Replace Esophageal Stent (N/A, 11/3/2017); Combined Esophagoscopy, Gastroscopy, Duodenoscopy (EGD), Replace Esophageal Stent (N/A, 11/17/2017); and Combined Esophagoscopy, Gastroscopy, Duodenoscopy (EGD), Remove Esophageal Stent (N/A, 2/12/2018).  FAMILY HISTORY: family history includes Alzheimer Disease in her mother; Breast Cancer in her daughter; Cerebrovascular Disease in her father; Ovarian Cancer in her sister.  SOCIAL HISTORY:   reports that she quit smoking about 21 years ago. Her smoking use included cigarettes. She has a 15.00 pack-year smoking history. She has never used smokeless tobacco. She reports that she does not drink alcohol or use drugs.    Post Discharge Medication Reconciliation Status: discharge medications reconciled, continue medications without change    Current Outpatient Medications   Medication Sig Dispense Refill     Acetaminophen (TYLENOL EXTRA STRENGTH PO) Take 1,000 mg by mouth 3 times daily And 500 mg by mouth 2 times daily as needed       aspirin (ASA) 325 MG EC tablet Take 325 mg by mouth daily Give with meal       aspirin 81 MG EC tablet Take 81 mg by mouth daily       atorvastatin (LIPITOR) 80 MG tablet  Take 80 mg by mouth At Bedtime        benzonatate (TESSALON) 100 MG capsule Take 100 mg by mouth 3 times daily as needed for cough       Calcium Carb-Cholecalciferol (OS-ANNABELLE PO) Take 1 tablet by mouth 3 times daily (with meals)       DULoxetine (CYMBALTA) 30 MG capsule Take 30 mg by mouth daily       ferrous sulfate (FEROSUL) 325 (65 Fe) MG tablet Take 325 mg by mouth daily (with breakfast)       furosemide (LASIX) 20 MG tablet Take 40 mg by mouth 2 times daily        gabapentin (NEURONTIN) 400 MG capsule Take 400 mg by mouth 2 times daily       HYDROmorphone (DILAUDID) 2 MG tablet Take 2 mg by mouth 4 times daily as needed for severe pain        lidocaine (LIDODERM) 5 % patch Apply to painful area topically 2 times a day - 12 hours on and 12 hours off       loperamide (IMODIUM A-D) 2 MG tablet Take 2 mg by mouth daily as needed Give 4mg with first loose stool AND Give 2 mg by mouth as needed for Loose Stools 2 mg with each subsequent loose stool episode after initial 4 mg first dose. NOT TO EXCEED 16 MG IN 24       magnesium oxide 400 MG CAPS Take 400 mg by mouth 2 times daily       MELATONIN PO Take 10 mg by mouth At Bedtime        metoprolol succinate (TOPROL-XL) 50 MG 24 hr tablet Take 25 mg by mouth daily        multivitamin w/minerals (THERA-VIT-M) tablet Take 1 tablet by mouth daily       Nutritional Supplements (NUTRITIONAL SUPPLEMENT PO) Take by mouth At Bedtime Snack at  hs.       omeprazole 20 MG tablet Take 1 tablet (20 mg) by mouth 2 times daily 180 tablet 3     OTHER MEDICAL SUPPLIES every morning Daily weights.       OTHER MEDICAL SUPPLIES 4 times daily BP checks qid.       OTHER MEDICAL SUPPLIES Legs: Black socks and Compriflex LEs. Thigh high wraps. Leave on at all times. If soiled may remove thigh portions.    every shift       polyethylene glycol (MIRALAX/GLYCOLAX) packet Take 1 packet by mouth daily as needed for constipation       potassium chloride ER (K-DUR/KLOR-CON M) 20 MEQ CR tablet Take 40  mEq by mouth 2 times daily        ranitidine (ZANTAC) 75 MG tablet Take 75 mg by mouth 2 times daily Before lunch and at bedtime       senna-docusate (SENOKOT-S/PERICOLACE) 8.6-50 MG tablet Take 1 tablet by mouth 2 times daily       TRAZODONE HCL PO Take 150 mg by mouth At Bedtime        Zinc Sulfate (ZINC-220 PO) Take 1 tablet by mouth daily       medical cannabis (Patient's own supply.  Not a prescription) 1 Units Patient report she takes at home.  Not taking at TCU       ROS:  10 point ROS of systems including Constitutional, Eyes, Respiratory, Cardiovascular, Gastroenterology, Genitourinary, Integumentary, Musculoskeletal, Psychiatric were all negative except for pertinent positives noted in my HPI.    Vitals:  /68   Pulse 78   Temp 97.8  F (36.6  C)   Resp 16   Ht 1.524 m (5')   Wt 46.4 kg (102 lb 6.4 oz)   SpO2 97%   BMI 20.00 kg/m     Exam:  GENERAL APPEARANCE:  Alert, in no distress, thin, anxious  ENT:  Mouth and posterior oropharynx normal, moist mucous membranes  RESP:  lungs clear to auscultation , no respiratory distress  CV:  Palpation and auscultation of heart done , regular rate and rhythm, no murmur, rub, or gallop, peripheral edema 2+ in BLE  ABDOMEN:  Soft, nontender, nondistended, bowel sounds normal  M/S:   Generalized weakness.  Postoperative spine surgery, wearing TLSO  SKIN:  Upper portion of spine surgery incision open with mucopurulent drainage.  Periwound skin without redness or edema.  NEURO:   NFD  PSYCH:  anxious    Lab/Diagnostic data:  Recent labs in [facility] reviewed by me today.     ASSESSMENT/PLAN:  Acute diastolic heart failure (H)  Suspect continued related to blood transfusion and IV fluids during recent hospitalization postop.  Was IV diuresed.  Switched to oral Lasix.  Dosage reduced due to some hypotension.  Continue on 40 mg daily and continue to monitor.  Weight stable at 102 to 103 pounds  In addition to regular labs, daughter is requesting another BNP be checked  to assure heart failure is not exacerbating.    Hypotension due to hypovolemia  Had borderline low blood pressure at baseline, experienced hypotension in hospital, Lasix dose was decreased as well as metoprolol dose decreased.  Continue with metoprolol 25 mg and Lasix 40 mg daily    Urinary retention with incomplete bladder emptying  We will continue with PVR monitoring.  Averaging 1-300.  Straight catheter every 8 hours if bladder scan shows more than 350 ml  Follow-up with urology    Lymphedema  Suspect secondary to CHF and hypoalbuminemia.  Ultrasounds were negative for DVT.  Continue with PT and OT and lymphedema therapy.  Lymphedema therapist on vacation at this point.  Until she returns, nursing manager to rewrap legs daily.  We will have dietary consult for malnutrition and low albumin.    Postoperative anemia due to acute blood loss  Had hematuria for 24 hours.  Required transfusion of 1 unit of blood as patient was symptomatic  Will recheck CBC    Moderate protein-calorie malnutrition (H)  Dietary consult  Currently on multivitamin.  Nutritionist in hospital recommended zinc, will add that.  Also on nutritional supplements.  Patient concerned because she is not getting her PPIs timely before meals.  Acid reflux could be contributing to lack of appetite and poor intake.  We will schedule PPIs and ranitidine per patient's prior to admission familiar scheduling.    Closed compression fracture of L1 lumbar vertebra with routine healing, subsequent encounter  Spinal stenosis of lumbar region with neurogenic claudication  S/P lumbar laminectomy  Continue with PT and OT for strengthening and conditioning.  Continues to require the use of Dilaudid 3 times a day approximately.  Discussed goal of weaning off of narcotics.  Patient indicates goal to transition to something like tramadol next week.  Pharmacy alerts that patient's aspirin dose is higher than recommended dose.  As patient is mobile risk for DVTs is  decreased, recommend decrease aspirin to standard 81 mg daily.    Brachial artery thrombosis (H)  Resolved    Adjustment disorder with anxious mood  Moderate episode of recurrent major depressive disorder (H)  Insomnia, unspecified type  Sprinting difficulty managing so many medical issues and complications.  Having difficulty sleeping despite 10 mg of melatonin and 100 mg of trazodone.  Will increase trazodone 250 mg.  Patient agreeable to having in-house psych consult       transcribed by : Camille Muse  Orders:  1. Increase Trazodone to 150 mg PO at  Bedtime - Dx: Insomnia  2. In house psych referral - Dx: adjustment   3. Lymphedema therapy referral for LE & UE edema. Until lymphedema therapist contreras, nursing to re-wrap daily (+ PRN if loosen).  4. Dosing times for Omeprazole 30-60 minutes before breakfast and supper  5. Dosing times for Ranitadine - before lunch & at bedtime  6. Next lab day: CBC w/ diff - Dx: Anemia                            CMP - Dx: Edema, decreased albumin                            BNP - Dx: HF  7. Zinc 220 mg PO daily x 14 days - Dx: nutrition   8. Decrease ASA dose to 81 mg daily      Total time spent with patient visit at the skilled nursing facility was 40 minutes including patient visit and review of past records. Greater than 50% of total time spent with counseling and coordinating care due to admission  Electronically signed by:  CLIVE Mcguire CNP                       Sincerely,        CLIVE Mcguire CNP

## 2019-06-12 NOTE — PROGRESS NOTES
[facility]  PRIMARY CARE PROVIDER AND CLINIC:  Andrea Nino MD, [facility] [address] [facility] [address] / [facility] [address]  Chief Complaint   Patient presents with     Hospital F/U     [place] Medical Record Number:  [id number]  Place of Service where encounter took place:  [facility] ([facility]) [[id number]]    Minerva Blanco  is a 74 year old  (1945), admitted to the above facility from  [facility]. Hospital stay 6/5/2019 through 6/11/2019..  Admitted to this facility for  rehab, medical management and nursing care.    HPI:    HPI information obtained from: facility chart records, facility staff, patient report and Care Everywhere Epic chart review.   Brief Summary of Hospital Course:   74-year-old female with a past medical history significant for congestive heart failure, hypertension, significant degenerative spine disease status post vertebroplasty with 2 stages procedure of posterior fusion of T12/L1, GERD, DVT of left arm status post thrombectomy who had previously been in this TCU for recovery after spine surgery.  Was rehospitalized as edema was not progressing.  Rehospitalized for acute on chronic CHF, diastolic dysfunction and bilateral leg lymphedema.  Also developed gross hematuria and urinary retention with unknown etiology.  Required Whitt catheter.  Self-catheterization was recommended by urology but was too physically difficult to do as patient's is wearing a back brace due to surgery.  UA and UC were negative.  Albumin was quite low.  Received IV albumin.  Level back up to 2.6.  Also had a hypoglycemic event.  Started on supplements.  Patient had right upper quadrant abdominal pain off and on.  Liver ultrasound showed normal gallbladder with biliary dilatation and possible cholelithiasis.  Elevated alk phos but unremarkable other LFTs.  GI consulted.  Status post MRCP which was negative for Tanya no cholelithiasis.  GI indicated no further work-up  necessary.    Updates on Status Since Skilled nursing Admission:   Continues to have difficulties with urination.  Retaining anywhere from 1-300 post void.  Patient is very resistant to catheterization.  Denies hematuria.  Denies issues with bowels.  Denies chest pain or shortness of breath.  Appetite fair.      CODE STATUS/ADVANCE DIRECTIVES DISCUSSION:   CPR/Full code   Patient's living condition: lives alone  ALLERGIES: Amoxicillin; Ativan [lorazepam]; and Morphine  PAST MEDICAL HISTORY:  has a past medical history of Arrhythmia, Atrial fibrillation (H), Breast cancer (H) (2007), Chronic infection, Esophageal perforation, Fibromyalgia, GERD (gastroesophageal reflux disease), Hiatal hernia, History of blood transfusion, IBS (irritable bowel syndrome), Meniere's disease, MS (multiple sclerosis) (H), Multiple sclerosis (H), Noninfectious ileitis, and Other chronic pain. She also has no past medical history of Anemia, Diabetes (H), or PONV (postoperative nausea and vomiting).  PAST SURGICAL HISTORY:   has a past surgical history that includes Esophagoscopy, gastroscopy, duodenoscopy (EGD), combined (N/A, 4/18/2016); Pharyngostomy (N/A, 4/18/2016); Esophagoscopy, gastroscopy, duodenoscopy (EGD), combined (N/A, 4/25/2016); Pharyngostomy (N/A, 4/25/2016); appendectomy; Thoracotomy (Right, 1998); back surgery (5/1/2015); Wrist surgery; Nissen fundoplication (1/2016); GASTROSTOMY/JEJUN TUBE; Esophagoscopy, gastroscopy, duodenoscopy (EGD), combined (N/A, 5/4/2016); Pharyngostomy (N/A, 5/4/2016); Esophagoscopy, gastroscopy, duodenoscopy (EGD), combined (N/A, 5/18/2016); Esophagoscopy, gastroscopy, duodenoscopy (EGD), combined (N/A, 6/22/2016); Pharyngostomy (N/A, 6/22/2016); Esophagoscopy, gastroscopy, duodenoscopy (EGD), dilatation, combined (N/A, 6/29/2016); Irrigation and debridement chest washout, combined (N/A, 6/29/2016); Pharyngostomy (N/A, 6/29/2016); UGI ENDOSCOPY W TRANSENDOSCOPIC STENT PLACEMENT (N/A, 7/12/2016);  Esophagoscopy, gastroscopy, duodenoscopy (EGD), combined (N/A, 7/12/2016); UGI ENDOSCOPY W TRANSENDOSCOPIC STENT PLACEMENT (N/A, 7/22/2016); picc insertion (Left, 8/25/2016); Combined Esophagoscopy, Gastroscopy, Duodenoscopy (EGD), Remove Esophageal Stent (N/A, 8/26/2016); Nerve block peripheral (N/A, 8/30/2016); Lumpectomy breast (Right, 2007); Laparoscopy diagnostic (general) (N/A, 1/9/2017); Thoracotomy (Left, 1/9/2017); Esophagectomy (N/A, 1/9/2017); Create spit fistula (N/A, 1/9/2017); Laparoscopy diagnostic (general) (N/A, 4/17/2017); Close spit fistula (N/A, 4/17/2017); Laparoscopic assisted insertion tube jejunostomy (N/A, 4/17/2017); Bronchoscopy flexible (N/A, 4/17/2017); Esophagoscopy, gastroscopy, duodenoscopy (EGD), combined (N/A, 4/21/2017); Pharyngostomy (N/A, 4/21/2017); Esophagoscopy, gastroscopy, duodenoscopy (EGD), combined (N/A, 5/19/2017); Explore neck (N/A, 5/19/2017); Irrigation and debridement chest washout, combined (N/A, 5/23/2017); Esophagoscopy, gastroscopy, duodenoscopy (EGD), combined (N/A, 5/23/2017); Irrigation and debridement chest washout, combined (N/A, 5/24/2017); Irrigation and debridement chest washout, combined (N/A, 5/25/2017); Irrigation and debridement chest washout, combined (N/A, 5/26/2017); Incision and drainage chest washout, combined (N/A, 5/27/2017); Incision and drainage chest washout, combined (N/A, 5/28/2017); Irrigation and debridement chest washout, combined (N/A, 5/30/2017); PICC INSERTION - Rewire (Right, 05/31/2017); Irrigation and debridement chest washout, combined (N/A, 5/31/2017); Pharyngostomy (Left, 5/31/2017); Irrigation and debridement chest washout, combined (N/A, 6/1/2017); Irrigation and debridement chest washout, combined (N/A, 6/4/2017); Irrigation and debridement chest washout, combined (N/A, 6/2/2017); Incision and drainage chest washout, combined (N/A, 6/9/2017); Esophagoscopy, gastroscopy, duodenoscopy (EGD), combined (N/A, 6/27/2017); Incision  and drainage chest washout, combined (N/A, 7/17/2017); Esophagoscopy, gastroscopy, duodenoscopy (EGD), combined (N/A, 8/4/2017); Esophagoscopy, gastroscopy, duodenoscopy (EGD), combined (N/A, 8/25/2017); Combined Esophagoscopy, Gastroscopy, Duodenoscopy (EGD), Replace Esophageal Stent (N/A, 8/25/2017); Combined Esophagoscopy, Gastroscopy, Duodenoscopy (EGD), Replace Esophageal Stent (N/A, 9/15/2017); Repair fistula tracheoesophageal (N/A, 9/15/2017); Combined Esophagoscopy, Gastroscopy, Duodenoscopy (EGD), Replace Esophageal Stent (N/A, 10/20/2017); Esophagoscopy, gastroscopy, duodenoscopy (EGD), combined (N/A, 10/2/2017); Combined Esophagoscopy, Gastroscopy, Duodenoscopy (EGD), Replace Esophageal Stent (N/A, 11/3/2017); Combined Esophagoscopy, Gastroscopy, Duodenoscopy (EGD), Replace Esophageal Stent (N/A, 11/17/2017); and Combined Esophagoscopy, Gastroscopy, Duodenoscopy (EGD), Remove Esophageal Stent (N/A, 2/12/2018).  FAMILY HISTORY: family history includes Alzheimer Disease in her mother; Breast Cancer in her daughter; Cerebrovascular Disease in her father; Ovarian Cancer in her sister.  SOCIAL HISTORY:   reports that she quit smoking about 21 years ago. Her smoking use included cigarettes. She has a 15.00 pack-year smoking history. She has never used smokeless tobacco. She reports that she does not drink alcohol or use drugs.    Post Discharge Medication Reconciliation Status: discharge medications reconciled, continue medications without change    Current Outpatient Medications   Medication Sig Dispense Refill     Acetaminophen (TYLENOL EXTRA STRENGTH PO) Take 1,000 mg by mouth 3 times daily And 500 mg by mouth 2 times daily as needed       aspirin (ASA) 325 MG EC tablet Take 325 mg by mouth daily Give with meal       aspirin 81 MG EC tablet Take 81 mg by mouth daily       atorvastatin (LIPITOR) 80 MG tablet Take 80 mg by mouth At Bedtime        benzonatate (TESSALON) 100 MG capsule Take 100 mg by mouth 3  times daily as needed for cough       Calcium Carb-Cholecalciferol (OS-ANNABELLE PO) Take 1 tablet by mouth 3 times daily (with meals)       DULoxetine (CYMBALTA) 30 MG capsule Take 30 mg by mouth daily       ferrous sulfate (FEROSUL) 325 (65 Fe) MG tablet Take 325 mg by mouth daily (with breakfast)       furosemide (LASIX) 20 MG tablet Take 40 mg by mouth 2 times daily        gabapentin (NEURONTIN) 400 MG capsule Take 400 mg by mouth 2 times daily       HYDROmorphone (DILAUDID) 2 MG tablet Take 2 mg by mouth 4 times daily as needed for severe pain        lidocaine (LIDODERM) 5 % patch Apply to painful area topically 2 times a day - 12 hours on and 12 hours off       loperamide (IMODIUM A-D) 2 MG tablet Take 2 mg by mouth daily as needed Give 4mg with first loose stool AND Give 2 mg by mouth as needed for Loose Stools 2 mg with each subsequent loose stool episode after initial 4 mg first dose. NOT TO EXCEED 16 MG IN 24       magnesium oxide 400 MG CAPS Take 400 mg by mouth 2 times daily       MELATONIN PO Take 10 mg by mouth At Bedtime        metoprolol succinate (TOPROL-XL) 50 MG 24 hr tablet Take 25 mg by mouth daily        multivitamin w/minerals (THERA-VIT-M) tablet Take 1 tablet by mouth daily       Nutritional Supplements (NUTRITIONAL SUPPLEMENT PO) Take by mouth At Bedtime Snack at  hs.       omeprazole 20 MG tablet Take 1 tablet (20 mg) by mouth 2 times daily 180 tablet 3     OTHER MEDICAL SUPPLIES every morning Daily weights.       OTHER MEDICAL SUPPLIES 4 times daily BP checks qid.       OTHER MEDICAL SUPPLIES Legs: Black socks and Compriflex LEs. Thigh high wraps. Leave on at all times. If soiled may remove thigh portions.    every shift       polyethylene glycol (MIRALAX/GLYCOLAX) packet Take 1 packet by mouth daily as needed for constipation       potassium chloride ER (K-DUR/KLOR-CON M) 20 MEQ CR tablet Take 40 mEq by mouth 2 times daily        ranitidine (ZANTAC) 75 MG tablet Take 75 mg by mouth 2 times  daily Before lunch and at bedtime       senna-docusate (SENOKOT-S/PERICOLACE) 8.6-50 MG tablet Take 1 tablet by mouth 2 times daily       TRAZODONE HCL PO Take 150 mg by mouth At Bedtime        Zinc Sulfate (ZINC-220 PO) Take 1 tablet by mouth daily       medical cannabis (Patient's own supply.  Not a prescription) 1 Units Patient report she takes at home.  Not taking at TCU       ROS:  10 point ROS of systems including Constitutional, Eyes, Respiratory, Cardiovascular, Gastroenterology, Genitourinary, Integumentary, Musculoskeletal, Psychiatric were all negative except for pertinent positives noted in my HPI.    Vitals:  /68   Pulse 78   Temp 97.8  F (36.6  C)   Resp 16   Ht 1.524 m (5')   Wt 46.4 kg (102 lb 6.4 oz)   SpO2 97%   BMI 20.00 kg/m    Exam:  GENERAL APPEARANCE:  Alert, in no distress, thin, anxious  ENT:  Mouth and posterior oropharynx normal, moist mucous membranes  RESP:  lungs clear to auscultation , no respiratory distress  CV:  Palpation and auscultation of heart done , regular rate and rhythm, no murmur, rub, or gallop, peripheral edema 2+ in BLE  ABDOMEN:  Soft, nontender, nondistended, bowel sounds normal  M/S:   Generalized weakness.  Postoperative spine surgery, wearing TLSO  SKIN:  Upper portion of spine surgery incision open with mucopurulent drainage.  Periwound skin without redness or edema.  NEURO:   NFD  PSYCH:  anxious    Lab/Diagnostic data:  Recent labs in [facility] reviewed by me today.     ASSESSMENT/PLAN:  Acute diastolic heart failure (H)  Suspect continued related to blood transfusion and IV fluids during recent hospitalization postop.  Was IV diuresed.  Switched to oral Lasix.  Dosage reduced due to some hypotension.  Continue on 40 mg daily and continue to monitor.  Weight stable at 102 to 103 pounds  In addition to regular labs, daughter is requesting another BNP be checked to assure heart failure is not exacerbating.    Hypotension due to hypovolemia  Had borderline  low blood pressure at baseline, experienced hypotension in hospital, Lasix dose was decreased as well as metoprolol dose decreased.  Continue with metoprolol 25 mg and Lasix 40 mg daily    Urinary retention with incomplete bladder emptying  We will continue with PVR monitoring.  Averaging 1-300.  Straight catheter every 8 hours if bladder scan shows more than 350 ml  Follow-up with urology    Lymphedema  Suspect secondary to CHF and hypoalbuminemia.  Ultrasounds were negative for DVT.  Continue with PT and OT and lymphedema therapy.  Lymphedema therapist on vacation at this point.  Until she returns, nursing manager to rewrap legs daily.  We will have dietary consult for malnutrition and low albumin.    Postoperative anemia due to acute blood loss  Had hematuria for 24 hours.  Required transfusion of 1 unit of blood as patient was symptomatic  Will recheck CBC    Moderate protein-calorie malnutrition (H)  Dietary consult  Currently on multivitamin.  Nutritionist in hospital recommended zinc, will add that.  Also on nutritional supplements.  Patient concerned because she is not getting her PPIs timely before meals.  Acid reflux could be contributing to lack of appetite and poor intake.  We will schedule PPIs and ranitidine per patient's prior to admission familiar scheduling.    Closed compression fracture of L1 lumbar vertebra with routine healing, subsequent encounter  Spinal stenosis of lumbar region with neurogenic claudication  S/P lumbar laminectomy  Continue with PT and OT for strengthening and conditioning.  Continues to require the use of Dilaudid 3 times a day approximately.  Discussed goal of weaning off of narcotics.  Patient indicates goal to transition to something like tramadol next week.  Pharmacy alerts that patient's aspirin dose is higher than recommended dose.  As patient is mobile risk for DVTs is decreased, recommend decrease aspirin to standard 81 mg daily.    Brachial artery thrombosis  (H)  Resolved    Adjustment disorder with anxious mood  Moderate episode of recurrent major depressive disorder (H)  Insomnia, unspecified type  Sprinting difficulty managing so many medical issues and complications.  Having difficulty sleeping despite 10 mg of melatonin and 100 mg of trazodone.  Will increase trazodone 250 mg.  Patient agreeable to having in-house psych consult       transcribed by : Camille Muse  Orders:  1. Increase Trazodone to 150 mg PO at  Bedtime - Dx: Insomnia  2. In house psych referral - Dx: adjustment   3. Lymphedema therapy referral for LE & UE edema. Until lymphedema therapist lyleal, nursing to re-wrap daily (+ PRN if loosen).  4. Dosing times for Omeprazole 30-60 minutes before breakfast and supper  5. Dosing times for Ranitadine - before lunch & at bedtime  6. Next lab day: CBC w/ diff - Dx: Anemia                            CMP - Dx: Edema, decreased albumin                            BNP - Dx: HF  7. Zinc 220 mg PO daily x 14 days - Dx: nutrition   8. Decrease ASA dose to 81 mg daily      Total time spent with patient visit at the skilled nursing facility was 40 minutes including patient visit and review of past records. Greater than 50% of total time spent with counseling and coordinating care due to admission  Electronically signed by:  CLIVE Mcguire CNP

## 2019-06-13 NOTE — TELEPHONE ENCOUNTER
Called re: swollen R arm, mostly distal to elbow. Hand feels cooler. Ms. Bravo alerted the nurse to this stating it looks like when she had a blood clot in her left arm.     Ok for Doppler U/S to RUE. Nurse describes swelling to LUE as well, can do bilateral U/S per nursing discretion on the edema. Also asked him to try some heat and elevate RUE o a pillow as could be thrombophlebitis.     Umm Mayer MD

## 2019-06-17 ENCOUNTER — NURSING HOME VISIT (OUTPATIENT)
Dept: GERIATRICS | Facility: CLINIC | Age: 74
End: 2019-06-17

## 2019-06-17 ENCOUNTER — COMMUNICATION - HEALTHEAST (OUTPATIENT)
Dept: PALLIATIVE MEDICINE | Facility: OTHER | Age: 74
End: 2019-06-17

## 2019-06-17 VITALS
DIASTOLIC BLOOD PRESSURE: 70 MMHG | HEART RATE: 84 BPM | TEMPERATURE: 98 F | RESPIRATION RATE: 18 BRPM | OXYGEN SATURATION: 97 % | WEIGHT: 103 LBS | SYSTOLIC BLOOD PRESSURE: 127 MMHG | HEIGHT: 60 IN | BODY MASS INDEX: 20.22 KG/M2

## 2019-06-17 DIAGNOSIS — I50.31 ACUTE DIASTOLIC HEART FAILURE (H): Primary | ICD-10-CM

## 2019-06-17 DIAGNOSIS — R33.9 URINARY RETENTION WITH INCOMPLETE BLADDER EMPTYING: ICD-10-CM

## 2019-06-17 DIAGNOSIS — F43.22 ADJUSTMENT DISORDER WITH ANXIOUS MOOD: ICD-10-CM

## 2019-06-17 DIAGNOSIS — M48.062 SPINAL STENOSIS OF LUMBAR REGION WITH NEUROGENIC CLAUDICATION: ICD-10-CM

## 2019-06-17 DIAGNOSIS — S32.010D CLOSED COMPRESSION FRACTURE OF L1 LUMBAR VERTEBRA WITH ROUTINE HEALING, SUBSEQUENT ENCOUNTER: ICD-10-CM

## 2019-06-17 DIAGNOSIS — E44.0 MODERATE PROTEIN-CALORIE MALNUTRITION (H): ICD-10-CM

## 2019-06-17 DIAGNOSIS — I89.0 LYMPHEDEMA: ICD-10-CM

## 2019-06-17 DIAGNOSIS — E86.1 HYPOTENSION DUE TO HYPOVOLEMIA: ICD-10-CM

## 2019-06-17 DIAGNOSIS — D62 POSTOPERATIVE ANEMIA DUE TO ACUTE BLOOD LOSS: ICD-10-CM

## 2019-06-17 DIAGNOSIS — G47.00 INSOMNIA, UNSPECIFIED TYPE: ICD-10-CM

## 2019-06-17 DIAGNOSIS — Z98.890 S/P LUMBAR LAMINECTOMY: ICD-10-CM

## 2019-06-17 PROCEDURE — 99310 SBSQ NF CARE HIGH MDM 45: CPT | Performed by: NURSE PRACTITIONER

## 2019-06-17 ASSESSMENT — MIFFLIN-ST. JEOR: SCORE: 893.7

## 2019-06-17 NOTE — PROGRESS NOTES
"[facility]  [place] Medical Record Number:  [id number]  [facility] [[id number][facility]  Chief Complaint   Patient presents with     RECHECK       HPI:    Minerva Blanco  is a 74 year old (1945), who is being seen today for an episodic care visit.  HPI information obtained from: facility chart records, facility staff and patient report. Today's concern is:     Acute diastolic heart failure (H)  Hypotension due to hypovolemia  Urinary retention with incomplete bladder emptying  Lymphedema  Postoperative anemia due to acute blood loss  Moderate protein-calorie malnutrition (H)  Closed compression fracture of L1 lumbar vertebra with routine healing, subsequent encounter  Spinal stenosis of lumbar region with neurogenic claudication  S/P lumbar laminectomy  Adjustment disorder with anxious mood  Insomnia, unspecified type   Patient tearful today, describing many losses. In process of calling, looking for group home to transfer to after TCU discharge.   Verbalizes doing well with therapy, gaining strength  Main concern is edema, lymphedema, poor endurance  Denies any chest pain, SOB, H/A, lightheadedness, dizziness.  Indicates bowels have normalized  Feels urination is \"more successful\"-urinating \" more than half\"  Per nursing staff, postvoid residual ranges from 200 to just under 350.  Admits to eating approx 50% of food served    Past Medical and Surgical History reviewed in Epic today.    MEDICATIONS:  Current Outpatient Medications   Medication Sig Dispense Refill     HYDROmorphone (DILAUDID) 2 MG tablet Take 2 mg by mouth 2 times daily as needed for severe pain        TRAMADOL HCL PO Take 25 mg by mouth 4 times daily as needed for moderate to severe pain       Acetaminophen (TYLENOL EXTRA STRENGTH PO) Take 1,000 mg by mouth 3 times daily And 500 mg by mouth 2 times daily as needed       aspirin 81 MG EC tablet Take 81 mg by mouth daily       " atorvastatin (LIPITOR) 80 MG tablet Take 80 mg by mouth At Bedtime        benzonatate (TESSALON) 100 MG capsule Take 100 mg by mouth 3 times daily as needed for cough       Calcium Carb-Cholecalciferol (OS-ANNABELLE PO) Take 1 tablet by mouth 3 times daily (with meals)       DULoxetine (CYMBALTA) 30 MG capsule Take 30 mg by mouth daily       ferrous sulfate (FEROSUL) 325 (65 Fe) MG tablet Take 325 mg by mouth daily (with breakfast)       furosemide (LASIX) 20 MG tablet Take 40 mg by mouth 2 times daily        gabapentin (NEURONTIN) 400 MG capsule Take 400 mg by mouth 2 times daily       lidocaine (LIDODERM) 5 % patch Apply to painful area topically 2 times a day - 12 hours on and 12 hours off       loperamide (IMODIUM A-D) 2 MG tablet Take 2 mg by mouth daily as needed Give 4mg with first loose stool AND Give 2 mg by mouth as needed for Loose Stools 2 mg with each subsequent loose stool episode after initial 4 mg first dose. NOT TO EXCEED 16 MG IN 24       magnesium oxide 400 MG CAPS Take 400 mg by mouth 2 times daily       medical cannabis (Patient's own supply.  Not a prescription) 1 Units Patient report she takes at home.  Not taking at TCU       MELATONIN PO Take 10 mg by mouth At Bedtime        metoprolol succinate (TOPROL-XL) 50 MG 24 hr tablet Take 25 mg by mouth daily        multivitamin w/minerals (THERA-VIT-M) tablet Take 1 tablet by mouth daily       Nutritional Supplements (NUTRITIONAL SUPPLEMENT PO) Take by mouth At Bedtime Snack at  hs.       omeprazole 20 MG tablet Take 1 tablet (20 mg) by mouth 2 times daily 180 tablet 3     OTHER MEDICAL SUPPLIES every morning Daily weights.       OTHER MEDICAL SUPPLIES 4 times daily BP checks qid.       OTHER MEDICAL SUPPLIES Legs: Black socks and Compriflex LEs. Thigh high wraps. Leave on at all times. If soiled may remove thigh portions.    every shift       polyethylene glycol (MIRALAX/GLYCOLAX) packet Take 1 packet by mouth daily as needed for constipation        potassium chloride ER (K-DUR/KLOR-CON M) 20 MEQ CR tablet Take 40 mEq by mouth 2 times daily        ranitidine (ZANTAC) 75 MG tablet Take 75 mg by mouth 2 times daily Before lunch and at bedtime       senna-docusate (SENOKOT-S/PERICOLACE) 8.6-50 MG tablet Take 1 tablet by mouth 2 times daily       TRAZODONE HCL PO Take 150 mg by mouth At Bedtime        Zinc Sulfate (ZINC-220 PO) Take 1 tablet by mouth daily         REVIEW OF SYSTEMS:  4 point ROS including Respiratory, CV, GI and , other than that noted in the HPI,  is negative    Objective:  /70   Pulse 84   Temp 98  F (36.7  C)   Resp 18   Ht 1.524 m (5')   Wt 46.7 kg (103 lb)   SpO2 97%   BMI 20.12 kg/m    Exam:  GENERAL APPEARANCE:  Alert, in no distress, thin  RESP:  lungs clear to auscultation , no respiratory distress  CV:  Palpation and auscultation of heart done , regular rate and rhythm, no murmur, rub, or gallop, Lower extremity edema feet to thighs, 3+ pitting.  Ace wraps falling off  ABDOMEN:  Soft, nontender, bowel sounds normal  M/S:   Generalized weakness, wearing TLSO, using walker for ambulation  SKIN:  Venous stasis hemosiderin staining right lateral lower extremity, right upper extremity 2 areas of weeping.  Surgical incision on back granulating  PSYCH:  crying, anxious    Labs:   Labs done in SNF are in [place] EPIC. Please refer to them using EPIC/Care Everywhere. and Recent labs in EPIC reviewed by me today.     ASSESSMENT/PLAN:  Acute diastolic heart failure (H)  Lymphedema  Weight stable at 102 to 104 pounds  Edema increasing with inadequate Ace wrapping or compression garments support  Lymphedema therapist to follow this week  Lower extremities and right upper extremity rewrapped    Hypotension due to hypovolemia  Blood pressure is variable, intermittent low readings, but generally within acceptable range.   Continue with current regime  Blood pressures:  06/17/2019 09:03 127/70 mmHg  06/16/2019 00:43 106/50 mmHg  06/15/2019  08:58 95/62 mmHg  06/14/2019 08:31 125/67 mmHg  06/13/2019 13:25 116/79 mmHg  06/12/2019 13:44 130/64 mmHg  06/12/2019 10:49 124/68 mmHg    Urinary retention with incomplete bladder emptying  Urinary retention continues with postvoid residuals 2 and 300s.  Patient indicated she thought she had a urology appointment scheduled but provider unable to locate.  Staff will assure her appointment is made.     Postoperative anemia due to acute blood loss  Hgb 11.3 today.  Trending up well    Moderate protein-calorie malnutrition (H)  Has been trying more protein supplements  Appetite poor  Acid reflux symptoms dissipating    Closed compression fracture of L1 lumbar vertebra with routine healing, subsequent encounter  Spinal stenosis of lumbar region with neurogenic claudication  S/P lumbar laminectomy  Working well with physical and Occupational Therapy for strengthening, conditioning and function  Is continue to use Dilaudid 3-4 times per day  Per previous discussion we will work on transitioning off of Dilaudid.  Patient indicates she uses Dilaudid for all pain.  Not only related to surgical pain  Educated regarding using non-narcotics for generalized pain particularly less than 6/10  Negotiated changing Dilaudid to twice daily as needed,  particularly before bed and therapy.  Will add tramadol for breakthrough pain, <7/10.    Adjustment disorder with anxious mood  Insomnia, unspecified type  He sleeping better since the trazodone increased 250 mg nightly  Weepy today discussing numerous losses including her health, her home, her community related to needing to move to assisted living facility      transcribed by : Camille Muse  Orders:  1. Discontinue current hydromorphone orders  2. Hydromorphone 2 mg PO BID PRN - Dx: Pain (AM & PM)  3. Tramadol 25 mg PO QID PRN - Dx: Pain  4. Please assure appointments with Urology - (Dx: retention) and Orthopedics (dx: follow-up back surgery    Total time spent with  patient visit at the skilled nursing facility was 35 min including patient visit and review of past records. Greater than 50% of total time spent with counseling and coordinating care due to wrapping LE ACE wraps, UE dressing change and wrapping.  Electronically signed by:  CLIVE Mcguire CNP

## 2019-06-17 NOTE — LETTER
"    6/17/2019        RE: Minerva Blanco  [address]  [place] [address]        [facility]  [facility] Record Number:  [id number]  Place of Service where encounter took place:  [facility] [[id number]]  Chief Complaint   Patient presents with     RECHECK       HPI:    Minerva Blanco  is a 74 year old (1945), who is being seen today for an episodic care visit.  HPI information obtained from: facility chart records, facility staff and patient report. Today's concern is:     Acute diastolic heart failure (H)  Hypotension due to hypovolemia  Urinary retention with incomplete bladder emptying  Lymphedema  Postoperative anemia due to acute blood loss  Moderate protein-calorie malnutrition (H)  Closed compression fracture of L1 lumbar vertebra with routine healing, subsequent encounter  Spinal stenosis of lumbar region with neurogenic claudication  S/P lumbar laminectomy  Adjustment disorder with anxious mood  Insomnia, unspecified type   Patient tearful today, describing many losses. In process of calling, looking for [facility] to transfer to after TCU discharge.   Verbalizes doing well with therapy, gaining strength  Main concern is edema, lymphedema, poor endurance  Denies any chest pain, SOB, H/A, lightheadedness, dizziness.  Indicates bowels have normalized  Feels urination is \"more successful\"-urinating \" more than half\"  Per nursing staff, postvoid residual ranges from 200 to just under 350.  Admits to eating approx 50% of food served    Past Medical and Surgical History reviewed in Epic today.    MEDICATIONS:  Current Outpatient Medications   Medication Sig Dispense Refill     HYDROmorphone (DILAUDID) 2 MG tablet Take 2 mg by mouth 2 times daily as needed for severe pain        TRAMADOL HCL PO Take 25 mg by mouth 4 times daily as needed for moderate to severe pain       Acetaminophen (TYLENOL EXTRA STRENGTH PO) Take 1,000 mg by mouth 3 times daily And 500 " mg by mouth 2 times daily as needed       aspirin 81 MG EC tablet Take 81 mg by mouth daily       atorvastatin (LIPITOR) 80 MG tablet Take 80 mg by mouth At Bedtime        benzonatate (TESSALON) 100 MG capsule Take 100 mg by mouth 3 times daily as needed for cough       Calcium Carb-Cholecalciferol (OS-ANNABELLE PO) Take 1 tablet by mouth 3 times daily (with meals)       DULoxetine (CYMBALTA) 30 MG capsule Take 30 mg by mouth daily       ferrous sulfate (FEROSUL) 325 (65 Fe) MG tablet Take 325 mg by mouth daily (with breakfast)       furosemide (LASIX) 20 MG tablet Take 40 mg by mouth 2 times daily        gabapentin (NEURONTIN) 400 MG capsule Take 400 mg by mouth 2 times daily       lidocaine (LIDODERM) 5 % patch Apply to painful area topically 2 times a day - 12 hours on and 12 hours off       loperamide (IMODIUM A-D) 2 MG tablet Take 2 mg by mouth daily as needed Give 4mg with first loose stool AND Give 2 mg by mouth as needed for Loose Stools 2 mg with each subsequent loose stool episode after initial 4 mg first dose. NOT TO EXCEED 16 MG IN 24       magnesium oxide 400 MG CAPS Take 400 mg by mouth 2 times daily       medical cannabis (Patient's own supply.  Not a prescription) 1 Units Patient report she takes at home.  Not taking at TCU       MELATONIN PO Take 10 mg by mouth At Bedtime        metoprolol succinate (TOPROL-XL) 50 MG 24 hr tablet Take 25 mg by mouth daily        multivitamin w/minerals (THERA-VIT-M) tablet Take 1 tablet by mouth daily       Nutritional Supplements (NUTRITIONAL SUPPLEMENT PO) Take by mouth At Bedtime Snack at  hs.       omeprazole 20 MG tablet Take 1 tablet (20 mg) by mouth 2 times daily 180 tablet 3     OTHER MEDICAL SUPPLIES every morning Daily weights.       OTHER MEDICAL SUPPLIES 4 times daily BP checks qid.       OTHER MEDICAL SUPPLIES Legs: Black socks and Compriflex LEs. Thigh high wraps. Leave on at all times. If soiled may remove thigh portions.    every shift       polyethylene  glycol (MIRALAX/GLYCOLAX) packet Take 1 packet by mouth daily as needed for constipation       potassium chloride ER (K-DUR/KLOR-CON M) 20 MEQ CR tablet Take 40 mEq by mouth 2 times daily        ranitidine (ZANTAC) 75 MG tablet Take 75 mg by mouth 2 times daily Before lunch and at bedtime       senna-docusate (SENOKOT-S/PERICOLACE) 8.6-50 MG tablet Take 1 tablet by mouth 2 times daily       TRAZODONE HCL PO Take 150 mg by mouth At Bedtime        Zinc Sulfate (ZINC-220 PO) Take 1 tablet by mouth daily         REVIEW OF SYSTEMS:  4 point ROS including Respiratory, CV, GI and , other than that noted in the HPI,  is negative    Objective:  /70   Pulse 84   Temp 98  F (36.7  C)   Resp 18   Ht 1.524 m (5')   Wt 46.7 kg (103 lb)   SpO2 97%   BMI 20.12 kg/m     Exam:  GENERAL APPEARANCE:  Alert, in no distress, thin  RESP:  lungs clear to auscultation , no respiratory distress  CV:  Palpation and auscultation of heart done , regular rate and rhythm, no murmur, rub, or gallop, Lower extremity edema feet to thighs, 3+ pitting.  Ace wraps falling off  ABDOMEN:  Soft, nontender, bowel sounds normal  M/S:   Generalized weakness, wearing TLSO, using walker for ambulation  SKIN:  Venous stasis hemosiderin staining right lateral lower extremity, right upper extremity 2 areas of weeping.  Surgical incision on back granulating  PSYCH:  crying, anxious    Labs:   Labs done in SNF are in [facility]. Please refer to them using [facility] [employer] Everywhere. and Recent labs in EPIC reviewed by me today.     ASSESSMENT/PLAN:  Acute diastolic heart failure (H)  Lymphedema  Weight stable at 102 to 104 pounds  Edema increasing with inadequate Ace wrapping or compression garments support  Lymphedema therapist to follow this week  Lower extremities and right upper extremity rewrapped    Hypotension due to hypovolemia  Blood pressure is variable, intermittent low readings, but generally within acceptable range.   Continue with current  regime  Blood pressures:  06/17/2019 09:03 127/70 mmHg  06/16/2019 00:43 106/50 mmHg  06/15/2019 08:58 95/62 mmHg  06/14/2019 08:31 125/67 mmHg  06/13/2019 13:25 116/79 mmHg  06/12/2019 13:44 130/64 mmHg  06/12/2019 10:49 124/68 mmHg    Urinary retention with incomplete bladder emptying  Urinary retention continues with postvoid residuals 2 and 300s.  Patient indicated she thought she had a urology appointment scheduled but provider unable to locate.  Staff will assure her appointment is made.     Postoperative anemia due to acute blood loss  Hgb 11.3 today.  Trending up well    Moderate protein-calorie malnutrition (H)  Has been trying more protein supplements  Appetite poor  Acid reflux symptoms dissipating    Closed compression fracture of L1 lumbar vertebra with routine healing, subsequent encounter  Spinal stenosis of lumbar region with neurogenic claudication  S/P lumbar laminectomy  Working well with physical and Occupational Therapy for strengthening, conditioning and function  Is continue to use Dilaudid 3-4 times per day  Per previous discussion we will work on transitioning off of Dilaudid.  Patient indicates she uses Dilaudid for all pain.  Not only related to surgical pain  Educated regarding using non-narcotics for generalized pain particularly less than 6/10  Negotiated changing Dilaudid to twice daily as needed,  particularly before bed and therapy.  Will add tramadol for breakthrough pain, <7/10.    Adjustment disorder with anxious mood  Insomnia, unspecified type  He sleeping better since the trazodone increased 250 mg nightly  Weepy today discussing numerous losses including her health, her home, her community related to needing to move to assisted living facility      transcribed by : Camille Muse  Orders:  1. Discontinue current hydromorphone orders  2. Hydromorphone 2 mg PO BID PRN - Dx: Pain (AM & PM)  3. Tramadol 25 mg PO QID PRN - Dx: Pain  4. Please assure appointments with  Urology - (Dx: retention) and Orthopedics (dx: follow-up back surgery    Total time spent with patient visit at the [facility] nursing facility was 35 min including patient visit and review of past records. Greater than 50% of total time spent with counseling and coordinating care due to wrapping LE ACE wraps, UE dressing change and wrapping.  Electronically signed by:  CLIVE Mcguire CNP             Sincerely,        CLIVE Mcguire CNP

## 2019-06-19 ENCOUNTER — DOCUMENTATION ONLY (OUTPATIENT)
Dept: OTHER | Facility: CLINIC | Age: 74
End: 2019-06-19

## 2019-06-19 ENCOUNTER — AMBULATORY - HEALTHEAST (OUTPATIENT)
Dept: OTHER | Facility: CLINIC | Age: 74
End: 2019-06-19

## 2019-06-23 ASSESSMENT — MIFFLIN-ST. JEOR: SCORE: 915.48

## 2019-06-24 ENCOUNTER — DISCHARGE SUMMARY NURSING HOME (OUTPATIENT)
Dept: GERIATRICS | Facility: CLINIC | Age: 74
End: 2019-06-24

## 2019-06-24 VITALS
DIASTOLIC BLOOD PRESSURE: 59 MMHG | HEIGHT: 60 IN | RESPIRATION RATE: 16 BRPM | TEMPERATURE: 97.8 F | WEIGHT: 107.8 LBS | HEART RATE: 61 BPM | SYSTOLIC BLOOD PRESSURE: 117 MMHG | BODY MASS INDEX: 21.17 KG/M2 | OXYGEN SATURATION: 100 %

## 2019-06-24 DIAGNOSIS — M48.062 SPINAL STENOSIS OF LUMBAR REGION WITH NEUROGENIC CLAUDICATION: ICD-10-CM

## 2019-06-24 DIAGNOSIS — Z98.890 S/P LUMBAR LAMINECTOMY: ICD-10-CM

## 2019-06-24 DIAGNOSIS — S32.010D CLOSED COMPRESSION FRACTURE OF L1 LUMBAR VERTEBRA WITH ROUTINE HEALING, SUBSEQUENT ENCOUNTER: ICD-10-CM

## 2019-06-24 DIAGNOSIS — G47.00 INSOMNIA, UNSPECIFIED TYPE: ICD-10-CM

## 2019-06-24 DIAGNOSIS — F43.22 ADJUSTMENT DISORDER WITH ANXIOUS MOOD: ICD-10-CM

## 2019-06-24 DIAGNOSIS — I89.0 LYMPHEDEMA: ICD-10-CM

## 2019-06-24 DIAGNOSIS — I74.2 BRACHIAL ARTERY THROMBOSIS (H): ICD-10-CM

## 2019-06-24 DIAGNOSIS — D62 POSTOPERATIVE ANEMIA DUE TO ACUTE BLOOD LOSS: ICD-10-CM

## 2019-06-24 DIAGNOSIS — I50.31 ACUTE DIASTOLIC HEART FAILURE (H): Primary | ICD-10-CM

## 2019-06-24 DIAGNOSIS — R33.9 URINARY RETENTION WITH INCOMPLETE BLADDER EMPTYING: ICD-10-CM

## 2019-06-24 DIAGNOSIS — E44.0 MODERATE PROTEIN-CALORIE MALNUTRITION (H): ICD-10-CM

## 2019-06-24 PROCEDURE — 99316 NF DSCHRG MGMT 30 MIN+: CPT | Performed by: NURSE PRACTITIONER

## 2019-06-24 NOTE — LETTER
6/24/2019        RE: Minerva Blanco  [address]  [place] [address]        [facility] DISCHARGE SUMMARY  PATIENT'S NAME: Minerva Blanco  YOB: 1945  MEDICAL RECORD NUMBER:  [id number]  Place of Service where encounter took place:  [facility] ([facility] [[id number]]    PRIMARY CARE PROVIDER AND CLINIC RESPONSIBLE AFTER TRANSFER:   Andrea Nino MD, [facility] [address] / [facility] [id number]    Non-[facility] Provider     Transferring providers: CLIVE Mcguire CNP, Dr. Juan MD  Recent Hospitalization/ED:  Hospital  [facility] stay 4/22/2019 to 5/7/2019.  Date of SNF Admission: May / 08 / 2019  Date of SNF (anticipated) Discharge: June / 26 / 2019  Discharged to: new assisted living for patient walker [facility]  Cognitive Scores: SLUMS: 22/30  Physical Function: w/c and walker  DME: Wheelchair, Hospital Bed and Walker    CODE STATUS/ADVANCE DIRECTIVES DISCUSSION:  DNR  ALLERGIES: Amoxicillin; Ativan [lorazepam]; and Morphine    DISCHARGE DIAGNOSIS/NURSING FACILITY COURSE:   Brief Summary of Hospital Course:   74-year-old female history of coronary artery disease status post PCI of the proximal mid RCA on April 20, 2018, MS, hypertension, dyslipidemia admitted to [facility] on April 22 found to have T12-L1 compression fractures.  Initial treatment was with IV steroids.  Second opinion led to surgical intervention.  She underwent stage I posterior fusion T9 to pelvis with laminectomy T11-L1, hardware removal L5-S1.  Postop she received multiple blood transfusions and was in ICU due to hypotension, requiring pressor.  After hemoglobin improved she return to operating room for stage II ASF T12-L2 with corpectomy at L1.  No postop complications.  Hemoglobin remained stable.  On May 3 patient was found to have an acute thrombosed left brachial artery with threatened left hand suspected secondary to brachial artery line.   Underwent thrombectomy.  Was on IV heparin which was transitioned to aspirin 3 and 25 mg.  She did develop bilateral lower extremity pitting edema seen by lymphedema for wraps.  Developed a cough.  CT showed moderate bilateral pleural effusions, right greater than left  Echocardiogram shows normal left ventricular function, ejection fraction 65%.  No regional wall motion abnormalities.  Normal left ventricular wall thickness  Was rehabilitating well in TCU, preparing for discharge when experienced CHF exacerbation.  During that hospitalization also developed gross hematuria and urinary retention with unknown etiology.  Whitt catheter was removed before discharge back to TCU.  However would continue to require intermittent straight catheterization.  Also received IV albumin while hospitalized.     Updates on Status Since Skilled nursing Admission:   Patient is admitted to TCU yesterday.  Since that time staff's primary concern is her weight being edema.  Also concerned about a cough.  Patient's main concern also is her weeping edema.  Mainly due to the discomfort of always being moist.  No concerns regarding the surgery.  Intake fair, slept fair last night.  Denies any chest pain, shortness of breath, lightheadedness, dizziness, headaches.  Despite weeping edema, denies pain in extremities.  Does continue to experience her chronic diarrhea.  Concern regarding making it to the bathroom in time with adequate staff assistance    Past Medical History:  has a past medical history of Arrhythmia, Atrial fibrillation (H), Breast cancer (H) (2007), Chronic infection, Esophageal perforation, Fibromyalgia, GERD (gastroesophageal reflux disease), Hiatal hernia, History of blood transfusion, IBS (irritable bowel syndrome), Meniere's disease, MS (multiple sclerosis) (H), Multiple sclerosis (H), Noninfectious ileitis, and Other chronic pain. She also has no past medical history of Anemia, Diabetes (H), or PONV (postoperative nausea  and vomiting).     Heart Failure  Lymphedema  Oral Lasix dose reduced due to some hypotension.  Continues on 40 mg daily.  Weight has remained stable 10 3-1 06 range  BNP remains elevated.  Potassium replaced  Lymphedema therapist establishing home program.  Dietary consult for malnutrition and low albumin.  On supplements.  However does intermittently refuse.  Has also recent fused showers due to lymphedema wraps.  Encouraged removal of wraps, shower, reapplication of wraps.    Urinary Retention  Continues to retain urine after voiding.  Postvoid residuals measuring at anywhere from 200 to slightly over 350.  Patient is now refusing straight catheterization for residuals greater than 350.  Urine is foul-smelling, denies any suprapubic pain or pain with urination  Urology to follow    Closed compression fracture of L1 lumbar vertebra with routine healing, subsequent encounter  Spinal stenosis of lumbar region with neurogenic claudication  S/P lumbar laminectomy  Has participated fair with therapies.  Admits to dependence on Dilaudid.  Have attempted to wean.  Is now taking Dilaudid twice daily with tramadol available in between.  Wearing TLSO  Surgical incision healing slowly.  No signs or symptoms of infection  Fairly mobile, risk for DVT is decreased, aspirin for prophylaxis reduced to standard 81 mg daily       Brachial artery thrombosis (H)  Resolved.  Continues on aspirin    Postoperative anemia due to acute blood loss  Postop as well as hematuria.  Did receive 1 unit of blood and recent hospitalization. On iron supplements.  Hemoglobin gradually has come up.  11.3 on June 17.     Coronary artery disease involving native coronary artery of native heart without angina pectoris  Asymptomatic.  Other than irritating cough.  Status post PCI proximal mid RCA.  2 stent placements, inferior MI last year  Continue with aspirin 81mg daily, metoprolol 25 mg daily, Lipitor 80 mg daily  Follow with cardiology     Essential  hypertension/hypotension due to hypovolemia  Hypertension managed with metoprolol, Lasix-both had been reduced  Blood pressures acceptable range.  On the soft side.   Blood pressures:  2019 09:57 111/68 mmHg  2019 00:09 117/59 mmHg  2019 21:15 138/53 mmHg  2019 14:29 122/60 mmHg  2019 08:24 90/59 mmHg  2019 06:07 88/50 mmHg  2019 15:40 100/54 mmHg  2019 09:03 127/70 mmHg  2019 00:43 106/50 mmHg     Hypokalemia  Have adjusted regimes.  Potassium stable in the low fours.  Continues on 40 mEq twice daily     Hx of esophagectomy  Long history with esophageal perforation status post gastric surgery.  Has been chronically on high dose of omeprazole 40 mg twice daily.  Discussed with patient's the risks of high-dose omeprazole including risk for C. difficile.     Dosage reduced to 20 mg twice daily, continue to monitor, and increase if necessary.    Moderate protein-calorie malnutrition (H)  Dietary consulted.  Started on supplement also started on zinc based on nutritionist recommendation.  Reschedule PPIs and ranitidine per patient's familiar scheduling to facilitate appetite and reduced acid reflux interfering with appetite.     Adjustment Disorder  Insomnia, unspecified type  Patient experiencing numerous stressors and adjusting.  Has had multiple medical complications in the past 3 years including multiple GI surgeries to feedings cardiac issues and now the back surgery.  In addition her   in September he was her primary caregiver.  Has visited with  and in-house psychotherapist.  Increased trazodone to 150 mg nightly and melatonin 10 mg nightly to facilitate sleep         Discharge Medications:  Current Outpatient Medications   Medication Sig Dispense Refill     Acetaminophen (TYLENOL EXTRA STRENGTH PO) Take 1,000 mg by mouth 3 times daily And 500 mg by mouth 2 times daily as needed       aspirin 81 MG EC tablet Take 81 mg by mouth daily        atorvastatin (LIPITOR) 80 MG tablet Take 80 mg by mouth At Bedtime        benzonatate (TESSALON) 100 MG capsule Take 100 mg by mouth 3 times daily as needed for cough       Calcium Carb-Cholecalciferol (OS-ANNABELLE PO) Take 1 tablet by mouth 3 times daily (with meals)       DULoxetine (CYMBALTA) 30 MG capsule Take 30 mg by mouth daily       ferrous sulfate (FEROSUL) 325 (65 Fe) MG tablet Take 325 mg by mouth daily (with breakfast)       furosemide (LASIX) 20 MG tablet Take 40 mg by mouth 2 times daily        gabapentin (NEURONTIN) 400 MG capsule Take 400 mg by mouth 2 times daily       HYDROmorphone (DILAUDID) 2 MG tablet Take 2 mg by mouth 2 times daily as needed for severe pain        lidocaine (LIDODERM) 5 % patch Apply to painful area topically 2 times a day - 12 hours on and 12 hours off       loperamide (IMODIUM A-D) 2 MG tablet Take 2 mg by mouth daily as needed Give 4mg with first loose stool AND Give 2 mg by mouth as needed for Loose Stools 2 mg with each subsequent loose stool episode after initial 4 mg first dose. NOT TO EXCEED 16 MG IN 24       magnesium oxide 400 MG CAPS Take 400 mg by mouth 2 times daily       medical cannabis (Patient's own supply.  Not a prescription) 1 Units Patient report she takes at home.  Not taking at TCU       MELATONIN PO Take 10 mg by mouth At Bedtime        metoprolol succinate (TOPROL-XL) 50 MG 24 hr tablet Take 25 mg by mouth daily        multivitamin w/minerals (THERA-VIT-M) tablet Take 1 tablet by mouth daily       Nutritional Supplements (NUTRITIONAL SUPPLEMENT PO) Take by mouth At Bedtime Snack at  hs.       omeprazole 20 MG tablet Take 1 tablet (20 mg) by mouth 2 times daily 180 tablet 3     OTHER MEDICAL SUPPLIES every morning Daily weights.       OTHER MEDICAL SUPPLIES 4 times daily BP checks qid.       OTHER MEDICAL SUPPLIES Legs: Black socks and Compriflex LEs. Thigh high wraps. Leave on at all times. If soiled may remove thigh portions.    every shift        polyethylene glycol (MIRALAX/GLYCOLAX) packet Take 1 packet by mouth daily as needed for constipation       potassium chloride ER (K-DUR/KLOR-CON M) 20 MEQ CR tablet Take 40 mEq by mouth 2 times daily        ranitidine (ZANTAC) 75 MG tablet Take 75 mg by mouth 2 times daily Before lunch and at bedtime       senna-docusate (SENOKOT-S/PERICOLACE) 8.6-50 MG tablet Take 1 tablet by mouth 2 times daily       TRAMADOL HCL PO Take 25 mg by mouth 4 times daily as needed for moderate to severe pain       TRAZODONE HCL PO Take 150 mg by mouth At Bedtime        Zinc Sulfate (ZINC-220 PO) Take 1 tablet by mouth daily         Medication Changes/Rationale:     See above    Controlled medications sent with patient:   Medication: Dilaudid , 48 tabs given to patient at the time of discharge to take home  Script for Tramadol medication for 20 tabs and 0 refills given to patient at discharge to have them fill at their out patient pharmacy  Medication: Tramadol , 19 tabs given to patient at the time of discharge to take home     ROS:   4 point ROS including Respiratory, CV, GI and , other than that noted in the HPI,  is negative    Physical Exam:   Vitals: /59   Pulse 61   Temp 97.8  F (36.6  C)   Resp 16   Ht 1.524 m (5')   Wt 48.9 kg (107 lb 12.8 oz)   SpO2 100%   BMI 21.05 kg/m     BMI= Body mass index is 21.05 kg/m .  GENERAL APPEARANCE:  Alert, in no distress  RESP:  lungs clear to auscultation , no respiratory distress  CV:  Palpation and auscultation of heart done , regular rate and rhythm, no murmur, rub, or gallop, Edema 2+ in bilateral lower extremities.  Wrapped with lymphedema wraps  ABDOMEN:  Soft, nontender, nondistended, bowel sounds normal  M/S:   Generalized weakness.  Postop spine surgery.  Wearing TLSO, limits range of motion  SKIN:  Incision remains open on upper part.  Drainage nonpurulent  NEURO:   Foot drop.  PSYCH:  anxious     SNF labs:   Most Recent 3 CBC's:  Recent Labs   Lab Test  01/31/18  1321 11/15/17  1651 10/20/17  0720  07/07/17  0715   WBC 10.4 11.2*  --   --  5.8   HGB 11.8 11.0* 9.1*   < > 9.4*   MCV 93 98  --   --  95    380  --   --  343    < > = values in this interval not displayed.     Most Recent 3 BMP's:  Recent Labs   Lab Test 01/31/18  1321 11/15/17  1651 10/20/17  0720 09/15/17  0605 07/07/17  0715    136  --   --  140   POTASSIUM 3.8 4.1 4.0 4.1 3.9   CHLORIDE 99 101  --   --  104   CO2 31 31  --   --  29   BUN 17 14  --   --  18   CR 0.46* 0.48*  --  0.50* 0.40*   ANIONGAP 4 4  --   --  7   ANNABELLE 9.0 9.0  --   --  8.1*   GLC 92 80  --   --  96     Most Recent 3 INR's:  Recent Labs   Lab Test 12/20/17  0823 09/15/17  0605 07/03/17  0714   INR 0.99 0.98 1.09     Most Recent 3 Creatinines:  Recent Labs   Lab Test 01/31/18  1321 11/15/17  1651 09/15/17  0605   CR 0.46* 0.48* 0.50*     INR Flow sheet at [facility]:    DISCHARGE PLAN:    Follow up labs: No labs orders/due    Medical Follow Up:      Follow up with Assisted Living Facility  Patient had should have follow-up with urology ASAP regarding retention, orthopedic follow-up postop back surgery, and neurology follow-up regarding her MS, urinary and bowel function changes, foot drop.  In addition to primary care provider, and possibly cardiology.    Discharge Services: Home Care:  Occupational Therapy, Physical Therapy, Registered Nurse, Home Health Aide and Lymphedema Clinic          Documentation of Face-to-Face and Certification for Home Health Services     Patient: Minerva Blanco   YOB: 1945  MR Number: [id number]  Today's Date: 6/24/2019    I certify that patient: Minerva Blanco is under my care and that I had a face-to-face encounter that meets the physician face-to-face encounter requirements with this patient on: 6/24/2019.    This encounter with the patient was in whole, or in part, for the following medical condition, which is the primary reason for home health care:     ICD-10-CM    1.  Acute diastolic heart failure (H) I50.31 DNR/DNI   2. Lymphedema I89.0    3. Urinary retention with incomplete bladder emptying R33.9    4. Postoperative anemia due to acute blood loss D62    5. Moderate protein-calorie malnutrition (H) E44.0    6. Closed compression fracture of L1 lumbar vertebra with routine healing, subsequent encounter S32.010D    7. Spinal stenosis of lumbar region with neurogenic claudication M48.062    8. S/P lumbar laminectomy Z98.890    9. Brachial artery thrombosis (H) I74.2    10. Adjustment disorder with anxious mood F43.22    11. Insomnia, unspecified type G47.00      I certify that, based on my findings, the following services are medically necessary home health services: Nursing, Occupational Therapy and Physical Therapy.    My clinical findings support the need for the above services because: Nurse is needed: To assess Postop wound, home safety, mental health, urinary and bowel status, follow-up with appointments, personal cares, acid reflux after changes in medications or other medical regimen.., Occupational Therapy Services are needed to assess and treat cognitive ability and address ADL safety due to impairment in Cognitive status, ADLs. and Physical Therapy Services are needed to assess and treat the following functional impairments: Ambulation, endurance.    Further, I certify that my clinical findings support that this patient is homebound (i.e. absences from home require considerable and taxing effort and are for medical reasons or [employer] services or infrequently or of short duration when for other reasons) because: Requires assistance of another person or specialized equipment to access medical services because patient:  Mobility limited by TLSO, generalized weakness, foot drop, poor endurance..    Based on the above findings. I certify that this patient is confined to the home and needs intermittent skilled nursing care, physical therapy and/or speech therapy.  The  patient is under my care, and I have initiated the establishment of the plan of care.  This patient will be followed by a physician who will periodically review the plan of care.  Physician/Provider to provide follow up care: Andrea Nino    Responsible Medicare certified PECOS Physician: Keisha Ellsworth CNP  Physician Signature: See electronic signature associated with these discharge orders.  Date: 2019        Face to Face and Medical Necessity Statement for DME Provider visit    Demographic Information on Minerva Blanco:  Gender: female  : 1945  [address]   [place] [address]  [phone] (home)     Medical Record: [id number]  Social Security Number: [id number]  Primary Care Provider: Andrea Nino  Insurance: Payor: MEDICARE / Plan: MEDICARE FOR HB SUPPLEMENT / Product Type: Medicare /     HPI:   Minerva Blanco is a 74 year old  (1945), who is being seen today for a face to face provider visit at [facility]; medical necessity statement for DME included. This patient requires the following:  DME Ordered and Medical Necessity Statement   Hospital bed: semi electronic with half side rails  The patient does  require positioning of the body in ways not feasible with an ordinary bed due to a medical condition that is expected to last at least 1 month due to   Pain secondary to T12 and L1 Compression fracture s/p posterior fusion T9-pelvis with laminectomy T11-L1, hardware removal L5-S1 s/p stage 2 ASF T12-L2 with corpectomy L1, chronic pain syndrome.  Shortness of breath secondary to congestive heart failure.  .   The patient does  require, for the alleviation of pain, postioning of the body in ways not feasible with an ordinary bed.   Pain secondary to T12 and L1 Compression fracture s/p posterior fusion T9-pelvis with laminectomy T11-L1, hardware removal L5-S1 s/p stage 2 ASF T12-L2 with corpectomy L1, chronic pain syndrome.    The patient does   require the head of bed elevated more than 30* most of the time due to CHF, chronic pulmonary disease or aspiration.  Shortness of breath secondary to congestive heart failure, lymphedema, wheeping of peripheral extremities.    The patient does not require traction that can only be attached to a hospital bed.  The patient does  require a bed height different than a fixed height hospital bed to permit tranfers to wheelchair or standing position.   Pain secondary to T12 and L1 Compression fracture s/p posterior fusion T9-pelvis with laminectomy T11-L1, hardware removal L5-S1 s/p stage 2 ASF T12-L2 with corpectomy L1, chronic pain syndrome.  No lifting greater than 5 pounds, No bending, twisting, or lifting with lower back.     The patient does  require frequent or immediate changes in body position due to   High risk for development of pressure injuries d/t impaired mobility, malnutrition, limited movement r/t Pain secondary to T12 and L1 Compression fracture s/p posterior fusion T9-pelvis with laminectomy T11-L1, hardware removal L5-S1 s/p stage 2 ASF T12-L2 with corpectomy L1, chronic pain syndrome..       Pt needing above DME with expected length of need of 99   months  due to medical necessity associated with following diagnosis:     Acute diastolic heart failure (H)  Lymphedema  Urinary retention with incomplete bladder emptying  Postoperative anemia due to acute blood loss  Moderate protein-calorie malnutrition (H)  Closed compression fracture of L1 lumbar vertebra with routine healing, subsequent encounter  Spinal stenosis of lumbar region with neurogenic claudication  S/P lumbar laminectomy  Brachial artery thrombosis (H)  Adjustment disorder with anxious mood  Insomnia, unspecified type      PMH   has a past medical history of Arrhythmia, Atrial fibrillation (H), Breast cancer (H) (2007), Chronic infection, Esophageal perforation, Fibromyalgia, GERD (gastroesophageal reflux disease), Hiatal hernia, History of  blood transfusion, IBS (irritable bowel syndrome), Meniere's disease, MS (multiple sclerosis) (H), Multiple sclerosis (H), Noninfectious ileitis, and Other chronic pain. She also has no past medical history of Anemia, Diabetes (H), or PONV (postoperative nausea and vomiting).    ROS:see above    EXAM  Vitals: /59   Pulse 61   Temp 97.8  F (36.6  C)   Resp 16   Ht 1.524 m (5')   Wt 48.9 kg (107 lb 12.8 oz)   SpO2 100%   BMI 21.05 kg/m   ;BMI= Body mass index is 21.05 kg/m .  See above     ASSESSMENT/PLAN:  1. Acute diastolic heart failure (H)    2. Lymphedema    3. Urinary retention with incomplete bladder emptying    4. Postoperative anemia due to acute blood loss    5. Moderate protein-calorie malnutrition (H)    6. Closed compression fracture of L1 lumbar vertebra with routine healing, subsequent encounter    7. Spinal stenosis of lumbar region with neurogenic claudication    8. S/P lumbar laminectomy    9. Brachial artery thrombosis (H)    10. Adjustment disorder with anxious mood    11. Insomnia, unspecified type        Orders:  1. Facility staff/TC to contact DME company to get their order form for provider to fill out    ELECTRONICALLY SIGNED BY [place] CERTIFIED PROVIDER:  CLIVE Mcguire CNP   NPI: [id number]  [facility]  [address] [place] [address], [address]  [place] [address]    Orders:  1. Ok to discharge to [facility] with home care & meds. Recommend Urology f/u ASAP, Ortho and Neurology in addition to f/u with PCP.        TOTAL DISCHARGE TIME:   Greater than 30 minutes  Electronically signed by:  CLIVE Mcguire CNP                   Sincerely,        CLIVE Mcguire CNP

## 2019-06-24 NOTE — PROGRESS NOTES
[facility] DISCHARGE SUMMARY  PATIENT'S NAME: Minerva Blanco  YOB: 1945  MEDICAL RECORD NUMBER:  [id number]  Place of Service where encounter took place:  [facility] ([facility]) [[id number]]    PRIMARY CARE PROVIDER AND CLINIC RESPONSIBLE AFTER TRANSFER:   Andrea Nino MD, Critical access hospital [address] / [facility] [address]    Non-[facility] Provider     Transferring providers: CLIVE Mcguire CNP, Dr. Juan MD  Recent Hospitalization/ED:  Hospital  [facility] stay 4/22/2019 to 5/7/2019.  Date of SNF Admission: May / 08 / 2019  Date of SNF (anticipated) Discharge: June / 26 / 2019  Discharged to: new assisted living for patient walker [facility]  Cognitive Scores: SLUMS: 22/30  Physical Function: w/c and walker  DME: Wheelchair, Hospital Bed and Walker    CODE STATUS/ADVANCE DIRECTIVES DISCUSSION:  DNR  ALLERGIES: Amoxicillin; Ativan [lorazepam]; and Morphine    DISCHARGE DIAGNOSIS/NURSING FACILITY COURSE:   Brief Summary of Hospital Course:   74-year-old female history of coronary artery disease status post PCI of the proximal mid RCA on April 20, 2018, MS, hypertension, dyslipidemia admitted to [facility] on April 22 found to have T12-L1 compression fractures.  Initial treatment was with IV steroids.  Second opinion led to surgical intervention.  She underwent stage I posterior fusion T9 to pelvis with laminectomy T11-L1, hardware removal L5-S1.  Postop she received multiple blood transfusions and was in ICU due to hypotension, requiring pressor.  After hemoglobin improved she return to operating room for stage II ASF T12-L2 with corpectomy at L1.  No postop complications.  Hemoglobin remained stable.  On May 3 patient was found to have an acute thrombosed left brachial artery with threatened left hand suspected secondary to brachial artery line.  Underwent thrombectomy.  Was on IV heparin which was transitioned to aspirin 3 and 25 mg.  She did develop  bilateral lower extremity pitting edema seen by lymphedema for wraps.  Developed a cough.  CT showed moderate bilateral pleural effusions, right greater than left  Echocardiogram shows normal left ventricular function, ejection fraction 65%.  No regional wall motion abnormalities.  Normal left ventricular wall thickness  Was rehabilitating well in TCU, preparing for discharge when experienced CHF exacerbation.  During that hospitalization also developed gross hematuria and urinary retention with unknown etiology.  Whitt catheter was removed before discharge back to TCU.  However would continue to require intermittent straight catheterization.  Also received IV albumin while hospitalized.     Updates on Status Since Skilled nursing Admission:   Patient is admitted to TCU yesterday.  Since that time staff's primary concern is her weight being edema.  Also concerned about a cough.  Patient's main concern also is her weeping edema.  Mainly due to the discomfort of always being moist.  No concerns regarding the surgery.  Intake fair, slept fair last night.  Denies any chest pain, shortness of breath, lightheadedness, dizziness, headaches.  Despite weeping edema, denies pain in extremities.  Does continue to experience her chronic diarrhea.  Concern regarding making it to the bathroom in time with adequate staff assistance    Past Medical History:  has a past medical history of Arrhythmia, Atrial fibrillation (H), Breast cancer (H) (2007), Chronic infection, Esophageal perforation, Fibromyalgia, GERD (gastroesophageal reflux disease), Hiatal hernia, History of blood transfusion, IBS (irritable bowel syndrome), Meniere's disease, MS (multiple sclerosis) (H), Multiple sclerosis (H), Noninfectious ileitis, and Other chronic pain. She also has no past medical history of Anemia, Diabetes (H), or PONV (postoperative nausea and vomiting).     Heart Failure  Lymphedema  Oral Lasix dose reduced due to some hypotension.  Continues  on 40 mg daily.  Weight has remained stable 10 3-1 06 range  BNP remains elevated.  Potassium replaced  Lymphedema therapist establishing home program.  Dietary consult for malnutrition and low albumin.  On supplements.  However does intermittently refuse.  Has also recent fused showers due to lymphedema wraps.  Encouraged removal of wraps, shower, reapplication of wraps.    Urinary Retention  Continues to retain urine after voiding.  Postvoid residuals measuring at anywhere from 200 to slightly over 350.  Patient is now refusing straight catheterization for residuals greater than 350.  Urine is foul-smelling, denies any suprapubic pain or pain with urination  Urology to follow    Closed compression fracture of L1 lumbar vertebra with routine healing, subsequent encounter  Spinal stenosis of lumbar region with neurogenic claudication  S/P lumbar laminectomy  Has participated fair with therapies.  Admits to dependence on Dilaudid.  Have attempted to wean.  Is now taking Dilaudid twice daily with tramadol available in between.  Wearing TLSO  Surgical incision healing slowly.  No signs or symptoms of infection  Fairly mobile, risk for DVT is decreased, aspirin for prophylaxis reduced to standard 81 mg daily       Brachial artery thrombosis (H)  Resolved.  Continues on aspirin    Postoperative anemia due to acute blood loss  Postop as well as hematuria.  Did receive 1 unit of blood and recent hospitalization. On iron supplements.  Hemoglobin gradually has come up.  11.3 on June 17.     Coronary artery disease involving native coronary artery of native heart without angina pectoris  Asymptomatic.  Other than irritating cough.  Status post PCI proximal mid RCA.  2 stent placements, inferior MI last year  Continue with aspirin 81mg daily, metoprolol 25 mg daily, Lipitor 80 mg daily  Follow with cardiology     Essential hypertension/hypotension due to hypovolemia  Hypertension managed with metoprolol, Lasix-both had been  reduced  Blood pressures acceptable range.  On the soft side.   Blood pressures:  2019 09:57 111/68 mmHg  2019 00:09 117/59 mmHg  2019 21:15 138/53 mmHg  2019 14:29 122/60 mmHg  2019 08:24 90/59 mmHg  2019 06:07 88/50 mmHg  2019 15:40 100/54 mmHg  2019 09:03 127/70 mmHg  2019 00:43 106/50 mmHg     Hypokalemia  Have adjusted regimes.  Potassium stable in the low fours.  Continues on 40 mEq twice daily     Hx of esophagectomy  Long history with esophageal perforation status post gastric surgery.  Has been chronically on high dose of omeprazole 40 mg twice daily.  Discussed with patient's the risks of high-dose omeprazole including risk for C. difficile.    Dosage reduced to 20 mg twice daily, continue to monitor, and increase if necessary.    Moderate protein-calorie malnutrition (H)  Dietary consulted.  Started on supplement also started on zinc based on nutritionist recommendation.  Reschedule PPIs and ranitidine per patient's familiar scheduling to facilitate appetite and reduced acid reflux interfering with appetite.     Adjustment Disorder  Insomnia, unspecified type  Patient experiencing numerous stressors and adjusting.  Has had multiple medical complications in the past 3 years including multiple GI surgeries to feedings cardiac issues and now the back surgery.  In addition her   in September he was her primary caregiver.  Has visited with  and in-house psychotherapist.  Increased trazodone to 150 mg nightly and melatonin 10 mg nightly to facilitate sleep         Discharge Medications:  Current Outpatient Medications   Medication Sig Dispense Refill     Acetaminophen (TYLENOL EXTRA STRENGTH PO) Take 1,000 mg by mouth 3 times daily And 500 mg by mouth 2 times daily as needed       aspirin 81 MG EC tablet Take 81 mg by mouth daily       atorvastatin (LIPITOR) 80 MG tablet Take 80 mg by mouth At Bedtime        benzonatate (TESSALON) 100 MG  capsule Take 100 mg by mouth 3 times daily as needed for cough       Calcium Carb-Cholecalciferol (OS-ANNABELLE PO) Take 1 tablet by mouth 3 times daily (with meals)       DULoxetine (CYMBALTA) 30 MG capsule Take 30 mg by mouth daily       ferrous sulfate (FEROSUL) 325 (65 Fe) MG tablet Take 325 mg by mouth daily (with breakfast)       furosemide (LASIX) 20 MG tablet Take 40 mg by mouth 2 times daily        gabapentin (NEURONTIN) 400 MG capsule Take 400 mg by mouth 2 times daily       HYDROmorphone (DILAUDID) 2 MG tablet Take 2 mg by mouth 2 times daily as needed for severe pain        lidocaine (LIDODERM) 5 % patch Apply to painful area topically 2 times a day - 12 hours on and 12 hours off       loperamide (IMODIUM A-D) 2 MG tablet Take 2 mg by mouth daily as needed Give 4mg with first loose stool AND Give 2 mg by mouth as needed for Loose Stools 2 mg with each subsequent loose stool episode after initial 4 mg first dose. NOT TO EXCEED 16 MG IN 24       magnesium oxide 400 MG CAPS Take 400 mg by mouth 2 times daily       medical cannabis (Patient's own supply.  Not a prescription) 1 Units Patient report she takes at home.  Not taking at TCU       MELATONIN PO Take 10 mg by mouth At Bedtime        metoprolol succinate (TOPROL-XL) 50 MG 24 hr tablet Take 25 mg by mouth daily        multivitamin w/minerals (THERA-VIT-M) tablet Take 1 tablet by mouth daily       Nutritional Supplements (NUTRITIONAL SUPPLEMENT PO) Take by mouth At Bedtime Snack at  hs.       omeprazole 20 MG tablet Take 1 tablet (20 mg) by mouth 2 times daily 180 tablet 3     OTHER MEDICAL SUPPLIES every morning Daily weights.       OTHER MEDICAL SUPPLIES 4 times daily BP checks qid.       OTHER MEDICAL SUPPLIES Legs: Black socks and Compriflex LEs. Thigh high wraps. Leave on at all times. If soiled may remove thigh portions.    every shift       polyethylene glycol (MIRALAX/GLYCOLAX) packet Take 1 packet by mouth daily as needed for constipation        potassium chloride ER (K-DUR/KLOR-CON M) 20 MEQ CR tablet Take 40 mEq by mouth 2 times daily        ranitidine (ZANTAC) 75 MG tablet Take 75 mg by mouth 2 times daily Before lunch and at bedtime       senna-docusate (SENOKOT-S/PERICOLACE) 8.6-50 MG tablet Take 1 tablet by mouth 2 times daily       TRAMADOL HCL PO Take 25 mg by mouth 4 times daily as needed for moderate to severe pain       TRAZODONE HCL PO Take 150 mg by mouth At Bedtime        Zinc Sulfate (ZINC-220 PO) Take 1 tablet by mouth daily         Medication Changes/Rationale:     See above    Controlled medications sent with patient:   Medication: Dilaudid , 48 tabs given to patient at the time of discharge to take home  Script for Tramadol medication for 20 tabs and 0 refills given to patient at discharge to have them fill at their out patient pharmacy  Medication: Tramadol , 19 tabs given to patient at the time of discharge to take home     ROS:   4 point ROS including Respiratory, CV, GI and , other than that noted in the HPI,  is negative    Physical Exam:   Vitals: /59   Pulse 61   Temp 97.8  F (36.6  C)   Resp 16   Ht 1.524 m (5')   Wt 48.9 kg (107 lb 12.8 oz)   SpO2 100%   BMI 21.05 kg/m    BMI= Body mass index is 21.05 kg/m .  GENERAL APPEARANCE:  Alert, in no distress  RESP:  lungs clear to auscultation , no respiratory distress  CV:  Palpation and auscultation of heart done , regular rate and rhythm, no murmur, rub, or gallop, Edema 2+ in bilateral lower extremities.  Wrapped with lymphedema wraps  ABDOMEN:  Soft, nontender, nondistended, bowel sounds normal  M/S:   Generalized weakness.  Postop spine surgery.  Wearing TLSO, limits range of motion  SKIN:  Incision remains open on upper part.  Drainage nonpurulent  NEURO:   Foot drop.  PSYCH:  anxious     SNF labs:   Most Recent 3 CBC's:  Recent Labs   Lab Test 01/31/18  1321 11/15/17  1651 10/20/17  0720  07/07/17  0715   WBC 10.4 11.2*  --   --  5.8   HGB 11.8 11.0* 9.1*   < >  9.4*   MCV 93 98  --   --  95    380  --   --  343    < > = values in this interval not displayed.     Most Recent 3 BMP's:  Recent Labs   Lab Test 01/31/18  1321 11/15/17  1651 10/20/17  0720 09/15/17  0605 07/07/17  0715    136  --   --  140   POTASSIUM 3.8 4.1 4.0 4.1 3.9   CHLORIDE 99 101  --   --  104   CO2 31 31  --   --  29   BUN 17 14  --   --  18   CR 0.46* 0.48*  --  0.50* 0.40*   ANIONGAP 4 4  --   --  7   ANNABELLE 9.0 9.0  --   --  8.1*   GLC 92 80  --   --  96     Most Recent 3 INR's:  Recent Labs   Lab Test 12/20/17  0823 09/15/17  0605 07/03/17  0714   INR 0.99 0.98 1.09     Most Recent 3 Creatinines:  Recent Labs   Lab Test 01/31/18  1321 11/15/17  1651 09/15/17  0605   CR 0.46* 0.48* 0.50*     INR Flow sheet at SNF:    DISCHARGE PLAN:    Follow up labs: No labs orders/due    Medical Follow Up:      Follow up with Assisted Living Facility  Patient had should have follow-up with urology ASAP regarding retention, orthopedic follow-up postop back surgery, and neurology follow-up regarding her MS, urinary and bowel function changes, foot drop.  In addition to primary care provider, and possibly cardiology.    Discharge Services: Home Care:  Occupational Therapy, Physical Therapy, Registered Nurse, Home Health Aide and Lymphedema Clinic          Documentation of Face-to-Face and Certification for Home Health Services     Patient: Minerva Blanco   YOB: 1945  MR Number: [id number]  Today's Date: 6/24/2019    I certify that patient: Minerva Blanco is under my care and that I had a face-to-face encounter that meets the physician face-to-face encounter requirements with this patient on: 6/24/2019.    This encounter with the patient was in whole, or in part, for the following medical condition, which is the primary reason for home health care:     ICD-10-CM    1. Acute diastolic heart failure (H) I50.31 DNR/DNI   2. Lymphedema I89.0    3. Urinary retention with incomplete bladder  emptying R33.9    4. Postoperative anemia due to acute blood loss D62    5. Moderate protein-calorie malnutrition (H) E44.0    6. Closed compression fracture of L1 lumbar vertebra with routine healing, subsequent encounter S32.010D    7. Spinal stenosis of lumbar region with neurogenic claudication M48.062    8. S/P lumbar laminectomy Z98.890    9. Brachial artery thrombosis (H) I74.2    10. Adjustment disorder with anxious mood F43.22    11. Insomnia, unspecified type G47.00      I certify that, based on my findings, the following services are medically necessary home health services: Nursing, Occupational Therapy and Physical Therapy.    My clinical findings support the need for the above services because: Nurse is needed: To assess Postop wound, home safety, mental health, urinary and bowel status, follow-up with appointments, personal cares, acid reflux after changes in medications or other medical regimen.., Occupational Therapy Services are needed to assess and treat cognitive ability and address ADL safety due to impairment in Cognitive status, ADLs. and Physical Therapy Services are needed to assess and treat the following functional impairments: Ambulation, endurance.    Further, I certify that my clinical findings support that this patient is homebound (i.e. absences from home require considerable and taxing effort and are for medical reasons or [facility] services or infrequently or of short duration when for other reasons) because: Requires assistance of another person or specialized equipment to access medical services because patient:  Mobility limited by TLSO, generalized weakness, foot drop, poor endurance..    Based on the above findings. I certify that this patient is confined to the home and needs intermittent skilled nursing care, physical therapy and/or speech therapy.  The patient is under my care, and I have initiated the establishment of the plan of care.  This patient will be followed by a  physician who will periodically review the plan of care.  Physician/Provider to provide follow up care: Andrea Nino    Responsible Medicare certified PECOS Physician: Keisha Ellsworth CNP  Physician Signature: See electronic signature associated with these discharge orders.  Date: 2019        Face to Face and Medical Necessity Statement for DME Provider visit    Demographic Information on Minerva Blanco:  Gender: female  : 1945  [address]   [place] [address]  [phone] (home)     Medical Record: [id number]  Social Security Number: [id number]  Primary Care Provider: Andrea Nino  Insurance: Payor: MEDICARE / Plan: MEDICARE FOR HB SUPPLEMENT / Product Type: Medicare /     HPI:   Minerva Blanco is a 74 year old  (1945), who is being seen today for a face to face provider visit at [facility]; medical necessity statement for DME included. This patient requires the following:  DME Ordered and Medical Necessity Statement   Hospital bed: semi electronic with half side rails  The patient does  require positioning of the body in ways not feasible with an ordinary bed due to a medical condition that is expected to last at least 1 month due to   Pain secondary to T12 and L1 Compression fracture s/p posterior fusion T9-pelvis with laminectomy T11-L1, hardware removal L5-S1 s/p stage 2 ASF T12-L2 with corpectomy L1, chronic pain syndrome.  Shortness of breath secondary to congestive heart failure.  .   The patient does  require, for the alleviation of pain, postioning of the body in ways not feasible with an ordinary bed.   Pain secondary to T12 and L1 Compression fracture s/p posterior fusion T9-pelvis with laminectomy T11-L1, hardware removal L5-S1 s/p stage 2 ASF T12-L2 with corpectomy L1, chronic pain syndrome.    The patient does  require the head of bed elevated more than 30* most of the time due to CHF, chronic pulmonary disease or aspiration.  Shortness  of breath secondary to congestive heart failure, lymphedema, wheeping of peripheral extremities.    The patient does not require traction that can only be attached to a hospital bed.  The patient does  require a bed height different than a fixed height hospital bed to permit tranfers to wheelchair or standing position.   Pain secondary to T12 and L1 Compression fracture s/p posterior fusion T9-pelvis with laminectomy T11-L1, hardware removal L5-S1 s/p stage 2 ASF T12-L2 with corpectomy L1, chronic pain syndrome.  No lifting greater than 5 pounds, No bending, twisting, or lifting with lower back.     The patient does  require frequent or immediate changes in body position due to   High risk for development of pressure injuries d/t impaired mobility, malnutrition, limited movement r/t Pain secondary to T12 and L1 Compression fracture s/p posterior fusion T9-pelvis with laminectomy T11-L1, hardware removal L5-S1 s/p stage 2 ASF T12-L2 with corpectomy L1, chronic pain syndrome..       Pt needing above DME with expected length of need of 99   months  due to medical necessity associated with following diagnosis:     Acute diastolic heart failure (H)  Lymphedema  Urinary retention with incomplete bladder emptying  Postoperative anemia due to acute blood loss  Moderate protein-calorie malnutrition (H)  Closed compression fracture of L1 lumbar vertebra with routine healing, subsequent encounter  Spinal stenosis of lumbar region with neurogenic claudication  S/P lumbar laminectomy  Brachial artery thrombosis (H)  Adjustment disorder with anxious mood  Insomnia, unspecified type      PMH   has a past medical history of Arrhythmia, Atrial fibrillation (H), Breast cancer (H) (2007), Chronic infection, Esophageal perforation, Fibromyalgia, GERD (gastroesophageal reflux disease), Hiatal hernia, History of blood transfusion, IBS (irritable bowel syndrome), Meniere's disease, MS (multiple sclerosis) (H), Multiple sclerosis (H),  Noninfectious ileitis, and Other chronic pain. She also has no past medical history of Anemia, Diabetes (H), or PONV (postoperative nausea and vomiting).    ROS:see above    EXAM  Vitals: /59   Pulse 61   Temp 97.8  F (36.6  C)   Resp 16   Ht 1.524 m (5')   Wt 48.9 kg (107 lb 12.8 oz)   SpO2 100%   BMI 21.05 kg/m  ;BMI= Body mass index is 21.05 kg/m .  See above     ASSESSMENT/PLAN:  1. Acute diastolic heart failure (H)    2. Lymphedema    3. Urinary retention with incomplete bladder emptying    4. Postoperative anemia due to acute blood loss    5. Moderate protein-calorie malnutrition (H)    6. Closed compression fracture of L1 lumbar vertebra with routine healing, subsequent encounter    7. Spinal stenosis of lumbar region with neurogenic claudication    8. S/P lumbar laminectomy    9. Brachial artery thrombosis (H)    10. Adjustment disorder with anxious mood    11. Insomnia, unspecified type        Orders:  1. Facility staff/TC to contact DME company to get their order form for provider to fill out    ELECTRONICALLY SIGNED BY YIKNA CERTIFIED PROVIDER:  CLIVE Mcguire CNP   NPI: [id number]  [facility]  [address] [place] [address], [address]  [place] [address]    Orders:  1. Ok to discharge to [facility] with home care & meds. Recommend Urology f/u ASAP, Ortho and Neurology in addition to f/u with PCP.        TOTAL DISCHARGE TIME:   Greater than 30 minutes  Electronically signed by:  CLIVE Mcguire CNP

## 2019-09-18 ENCOUNTER — TELEPHONE (OUTPATIENT)
Dept: SURGERY | Facility: CLINIC | Age: 74
End: 2019-09-18

## 2019-09-18 NOTE — TELEPHONE ENCOUNTER
[facility]    Phone Message    May a detailed message be left on voicemail: yes    Reason for Call: Other: Pt called in wants to see Dr Wise regarding pain in loiwer abdomin doesnt trust any other provider, please call pt back if he will see her, she stated her case is very complex but he understands      Action Taken: Message routed to:  [facility] ([facility] cardio thora

## 2019-09-20 ENCOUNTER — RECORDS - HEALTHEAST (OUTPATIENT)
Dept: LAB | Facility: CLINIC | Age: 74
End: 2019-09-20

## 2019-09-20 LAB
ALBUMIN SERPL-MCNC: 3.6 G/DL (ref 3.5–5)
ALP SERPL-CCNC: 191 U/L (ref 45–120)
ALT SERPL W P-5'-P-CCNC: 22 U/L (ref 0–45)
ANION GAP SERPL CALCULATED.3IONS-SCNC: 18 MMOL/L (ref 5–18)
AST SERPL W P-5'-P-CCNC: 43 U/L (ref 0–40)
BILIRUB SERPL-MCNC: 0.3 MG/DL (ref 0–1)
BUN SERPL-MCNC: 13 MG/DL (ref 8–28)
CALCIUM SERPL-MCNC: 8.4 MG/DL (ref 8.5–10.5)
CHLORIDE BLD-SCNC: 108 MMOL/L (ref 98–107)
CO2 SERPL-SCNC: 15 MMOL/L (ref 22–31)
CREAT SERPL-MCNC: 0.55 MG/DL (ref 0.6–1.1)
FOLATE SERPL-MCNC: 6 NG/ML
GFR SERPL CREATININE-BSD FRML MDRD: >60 ML/MIN/1.73M2
GLUCOSE BLD-MCNC: 56 MG/DL (ref 70–125)
IRON SATN MFR SERPL: 20 % (ref 20–50)
IRON SERPL-MCNC: 54 UG/DL (ref 42–175)
POTASSIUM BLD-SCNC: 5.7 MMOL/L (ref 3.5–5)
PROT SERPL-MCNC: 7.3 G/DL (ref 6–8)
SODIUM SERPL-SCNC: 141 MMOL/L (ref 136–145)
TIBC SERPL-MCNC: 264 UG/DL (ref 313–563)
TRANSFERRIN SERPL-MCNC: 211 MG/DL (ref 212–360)
TSH SERPL DL<=0.005 MIU/L-ACNC: 1.4 UIU/ML (ref 0.3–5)
VIT B12 SERPL-MCNC: 326 PG/ML (ref 213–816)

## 2019-09-24 ENCOUNTER — TELEPHONE (OUTPATIENT)
Dept: ONCOLOGY | Facility: CLINIC | Age: 74
End: 2019-09-24

## 2019-09-24 NOTE — TELEPHONE ENCOUNTER
Called patient regarding her lower abdominal pain. Dr. Wise doesn't think he is the appropriate person to see her. She should see her primary care physician. Patient stated she saw her PCP last week on Friday and she is going to get a CT Scan done. Patient agreed to wait and see what the scan shows and go from there.

## 2019-09-27 ENCOUNTER — RECORDS - HEALTHEAST (OUTPATIENT)
Dept: LAB | Facility: CLINIC | Age: 74
End: 2019-09-27

## 2019-09-27 LAB
BASOPHILS # BLD AUTO: 0.1 THOU/UL (ref 0–0.2)
BASOPHILS NFR BLD AUTO: 1 % (ref 0–2)
EOSINOPHIL # BLD AUTO: 0.1 THOU/UL (ref 0–0.4)
EOSINOPHIL NFR BLD AUTO: 2 % (ref 0–6)
ERYTHROCYTE [DISTWIDTH] IN BLOOD BY AUTOMATED COUNT: 14.6 % (ref 11–14.5)
HCT VFR BLD AUTO: 40.8 % (ref 35–47)
HGB BLD-MCNC: 12.9 G/DL (ref 12–16)
LYMPHOCYTES # BLD AUTO: 1.4 THOU/UL (ref 0.8–4.4)
LYMPHOCYTES NFR BLD AUTO: 23 % (ref 20–40)
MCH RBC QN AUTO: 34.2 PG (ref 27–34)
MCHC RBC AUTO-ENTMCNC: 31.6 G/DL (ref 32–36)
MCV RBC AUTO: 108 FL (ref 80–100)
MONOCYTES # BLD AUTO: 0.6 THOU/UL (ref 0–0.9)
MONOCYTES NFR BLD AUTO: 11 % (ref 2–10)
NEUTROPHILS # BLD AUTO: 3.8 THOU/UL (ref 2–7.7)
NEUTROPHILS NFR BLD AUTO: 64 % (ref 50–70)
PLATELET # BLD AUTO: 418 THOU/UL (ref 140–440)
PMV BLD AUTO: 10 FL (ref 8.5–12.5)
RBC # BLD AUTO: 3.77 MILL/UL (ref 3.8–5.4)
WBC: 5.9 THOU/UL (ref 4–11)

## 2020-01-01 ENCOUNTER — APPOINTMENT (OUTPATIENT)
Dept: GENERAL RADIOLOGY | Facility: CLINIC | Age: 75
DRG: 640 | End: 2020-01-01
Attending: ORTHOPAEDIC SURGERY
Payer: MEDICARE

## 2020-01-01 ENCOUNTER — APPOINTMENT (OUTPATIENT)
Dept: OCCUPATIONAL THERAPY | Facility: CLINIC | Age: 75
DRG: 640 | End: 2020-01-01
Payer: MEDICARE

## 2020-01-01 ENCOUNTER — APPOINTMENT (OUTPATIENT)
Dept: ULTRASOUND IMAGING | Facility: CLINIC | Age: 75
DRG: 177 | End: 2020-01-01
Attending: PHYSICIAN ASSISTANT
Payer: MEDICARE

## 2020-01-01 ENCOUNTER — APPOINTMENT (OUTPATIENT)
Dept: PHYSICAL THERAPY | Facility: CLINIC | Age: 75
DRG: 641 | End: 2020-01-01
Payer: MEDICARE

## 2020-01-01 ENCOUNTER — APPOINTMENT (OUTPATIENT)
Dept: GENERAL RADIOLOGY | Facility: CLINIC | Age: 75
DRG: 641 | End: 2020-01-01
Attending: EMERGENCY MEDICINE
Payer: MEDICARE

## 2020-01-01 ENCOUNTER — APPOINTMENT (OUTPATIENT)
Dept: OCCUPATIONAL THERAPY | Facility: CLINIC | Age: 75
DRG: 177 | End: 2020-01-01
Payer: MEDICARE

## 2020-01-01 ENCOUNTER — TRANSFERRED RECORDS (OUTPATIENT)
Dept: HEALTH INFORMATION MANAGEMENT | Facility: CLINIC | Age: 75
End: 2020-01-01

## 2020-01-01 ENCOUNTER — APPOINTMENT (OUTPATIENT)
Dept: OCCUPATIONAL THERAPY | Facility: CLINIC | Age: 75
DRG: 177 | End: 2020-01-01
Attending: PHYSICIAN ASSISTANT
Payer: MEDICARE

## 2020-01-01 ENCOUNTER — OFFICE VISIT - HEALTHEAST (OUTPATIENT)
Dept: GERIATRICS | Facility: CLINIC | Age: 75
End: 2020-01-01

## 2020-01-01 ENCOUNTER — APPOINTMENT (OUTPATIENT)
Dept: GENERAL RADIOLOGY | Facility: CLINIC | Age: 75
DRG: 177 | End: 2020-01-01
Payer: MEDICARE

## 2020-01-01 ENCOUNTER — RECORDS - HEALTHEAST (OUTPATIENT)
Dept: LAB | Facility: CLINIC | Age: 75
End: 2020-01-01

## 2020-01-01 ENCOUNTER — APPOINTMENT (OUTPATIENT)
Dept: OCCUPATIONAL THERAPY | Facility: CLINIC | Age: 75
DRG: 641 | End: 2020-01-01
Payer: MEDICARE

## 2020-01-01 ENCOUNTER — PATIENT OUTREACH (OUTPATIENT)
Dept: CARE COORDINATION | Facility: CLINIC | Age: 75
End: 2020-01-01

## 2020-01-01 ENCOUNTER — APPOINTMENT (OUTPATIENT)
Dept: PHYSICAL THERAPY | Facility: CLINIC | Age: 75
DRG: 640 | End: 2020-01-01
Payer: MEDICARE

## 2020-01-01 ENCOUNTER — APPOINTMENT (OUTPATIENT)
Dept: GENERAL RADIOLOGY | Facility: CLINIC | Age: 75
DRG: 640 | End: 2020-01-01
Attending: STUDENT IN AN ORGANIZED HEALTH CARE EDUCATION/TRAINING PROGRAM
Payer: MEDICARE

## 2020-01-01 ENCOUNTER — APPOINTMENT (OUTPATIENT)
Dept: MRI IMAGING | Facility: CLINIC | Age: 75
DRG: 177 | End: 2020-01-01
Attending: PHYSICIAN ASSISTANT
Payer: MEDICARE

## 2020-01-01 ENCOUNTER — APPOINTMENT (OUTPATIENT)
Dept: CT IMAGING | Facility: CLINIC | Age: 75
DRG: 177 | End: 2020-01-01
Attending: PHYSICIAN ASSISTANT
Payer: MEDICARE

## 2020-01-01 ENCOUNTER — APPOINTMENT (OUTPATIENT)
Dept: ULTRASOUND IMAGING | Facility: CLINIC | Age: 75
DRG: 641 | End: 2020-01-01
Attending: EMERGENCY MEDICINE
Payer: MEDICARE

## 2020-01-01 ENCOUNTER — APPOINTMENT (OUTPATIENT)
Dept: CARDIOLOGY | Facility: CLINIC | Age: 75
DRG: 640 | End: 2020-01-01
Attending: PHYSICIAN ASSISTANT
Payer: MEDICARE

## 2020-01-01 ENCOUNTER — APPOINTMENT (OUTPATIENT)
Dept: PHYSICAL THERAPY | Facility: CLINIC | Age: 75
DRG: 177 | End: 2020-01-01
Payer: MEDICARE

## 2020-01-01 ENCOUNTER — APPOINTMENT (OUTPATIENT)
Dept: CT IMAGING | Facility: CLINIC | Age: 75
DRG: 641 | End: 2020-01-01
Payer: MEDICARE

## 2020-01-01 ENCOUNTER — COMMUNICATION - HEALTHEAST (OUTPATIENT)
Dept: GERIATRICS | Facility: CLINIC | Age: 75
End: 2020-01-01

## 2020-01-01 ENCOUNTER — APPOINTMENT (OUTPATIENT)
Dept: CARDIOLOGY | Facility: CLINIC | Age: 75
DRG: 177 | End: 2020-01-01
Attending: EMERGENCY MEDICINE
Payer: MEDICARE

## 2020-01-01 ENCOUNTER — APPOINTMENT (OUTPATIENT)
Dept: GENERAL RADIOLOGY | Facility: CLINIC | Age: 75
DRG: 640 | End: 2020-01-01
Attending: EMERGENCY MEDICINE
Payer: MEDICARE

## 2020-01-01 ENCOUNTER — HEALTH MAINTENANCE LETTER (OUTPATIENT)
Age: 75
End: 2020-01-01

## 2020-01-01 ENCOUNTER — HOSPITAL ENCOUNTER (INPATIENT)
Facility: CLINIC | Age: 75
LOS: 8 days | DRG: 177 | End: 2020-09-29
Attending: EMERGENCY MEDICINE | Admitting: INTERNAL MEDICINE
Payer: MEDICARE

## 2020-01-01 ENCOUNTER — APPOINTMENT (OUTPATIENT)
Dept: GENERAL RADIOLOGY | Facility: CLINIC | Age: 75
DRG: 177 | End: 2020-01-01
Attending: EMERGENCY MEDICINE
Payer: MEDICARE

## 2020-01-01 ENCOUNTER — HOSPITAL ENCOUNTER (INPATIENT)
Facility: CLINIC | Age: 75
LOS: 4 days | Discharge: HOME HEALTH | DRG: 641 | End: 2020-08-21
Attending: EMERGENCY MEDICINE | Admitting: INTERNAL MEDICINE
Payer: MEDICARE

## 2020-01-01 ENCOUNTER — APPOINTMENT (OUTPATIENT)
Dept: GENERAL RADIOLOGY | Facility: CLINIC | Age: 75
DRG: 177 | End: 2020-01-01
Attending: PHYSICIAN ASSISTANT
Payer: MEDICARE

## 2020-01-01 ENCOUNTER — APPOINTMENT (OUTPATIENT)
Dept: SPEECH THERAPY | Facility: CLINIC | Age: 75
DRG: 177 | End: 2020-01-01
Attending: PHYSICIAN ASSISTANT
Payer: MEDICARE

## 2020-01-01 ENCOUNTER — HOSPITAL ENCOUNTER (INPATIENT)
Facility: CLINIC | Age: 75
LOS: 4 days | Discharge: HOME HEALTH | DRG: 640 | End: 2020-08-05
Attending: EMERGENCY MEDICINE | Admitting: ORTHOPAEDIC SURGERY
Payer: MEDICARE

## 2020-01-01 ENCOUNTER — APPOINTMENT (OUTPATIENT)
Dept: CARDIOLOGY | Facility: CLINIC | Age: 75
DRG: 177 | End: 2020-01-01
Payer: MEDICARE

## 2020-01-01 ENCOUNTER — APPOINTMENT (OUTPATIENT)
Dept: CT IMAGING | Facility: CLINIC | Age: 75
DRG: 640 | End: 2020-01-01
Attending: EMERGENCY MEDICINE
Payer: MEDICARE

## 2020-01-01 ENCOUNTER — APPOINTMENT (OUTPATIENT)
Dept: ULTRASOUND IMAGING | Facility: CLINIC | Age: 75
DRG: 640 | End: 2020-01-01
Attending: PHYSICIAN ASSISTANT
Payer: MEDICARE

## 2020-01-01 ENCOUNTER — APPOINTMENT (OUTPATIENT)
Dept: PHYSICAL THERAPY | Facility: CLINIC | Age: 75
DRG: 640 | End: 2020-01-01
Attending: PHYSICIAN ASSISTANT
Payer: MEDICARE

## 2020-01-01 ENCOUNTER — APPOINTMENT (OUTPATIENT)
Dept: CARDIOLOGY | Facility: CLINIC | Age: 75
DRG: 177 | End: 2020-01-01
Attending: INTERNAL MEDICINE
Payer: MEDICARE

## 2020-01-01 ENCOUNTER — APPOINTMENT (OUTPATIENT)
Dept: CT IMAGING | Facility: CLINIC | Age: 75
DRG: 641 | End: 2020-01-01
Attending: EMERGENCY MEDICINE
Payer: MEDICARE

## 2020-01-01 ENCOUNTER — APPOINTMENT (OUTPATIENT)
Dept: OCCUPATIONAL THERAPY | Facility: CLINIC | Age: 75
DRG: 640 | End: 2020-01-01
Attending: PHYSICIAN ASSISTANT
Payer: MEDICARE

## 2020-01-01 ENCOUNTER — APPOINTMENT (OUTPATIENT)
Dept: PHYSICAL THERAPY | Facility: CLINIC | Age: 75
DRG: 177 | End: 2020-01-01
Attending: PHYSICIAN ASSISTANT
Payer: MEDICARE

## 2020-01-01 ENCOUNTER — AMBULATORY - HEALTHEAST (OUTPATIENT)
Dept: GERIATRICS | Facility: CLINIC | Age: 75
End: 2020-01-01

## 2020-01-01 ENCOUNTER — AMBULATORY - HEALTHEAST (OUTPATIENT)
Dept: ADMINISTRATIVE | Facility: CLINIC | Age: 75
End: 2020-01-01

## 2020-01-01 VITALS
SYSTOLIC BLOOD PRESSURE: 132 MMHG | BODY MASS INDEX: 19.38 KG/M2 | HEIGHT: 60 IN | TEMPERATURE: 97.3 F | HEART RATE: 87 BPM | OXYGEN SATURATION: 95 % | RESPIRATION RATE: 16 BRPM | DIASTOLIC BLOOD PRESSURE: 70 MMHG | WEIGHT: 98.7 LBS

## 2020-01-01 VITALS
SYSTOLIC BLOOD PRESSURE: 112 MMHG | BODY MASS INDEX: 21.18 KG/M2 | OXYGEN SATURATION: 86 % | DIASTOLIC BLOOD PRESSURE: 30 MMHG | HEIGHT: 60 IN | WEIGHT: 107.9 LBS | TEMPERATURE: 96.8 F | RESPIRATION RATE: 9 BRPM | HEART RATE: 106 BPM

## 2020-01-01 VITALS
BODY MASS INDEX: 18.91 KG/M2 | TEMPERATURE: 98 F | WEIGHT: 96.3 LBS | HEART RATE: 97 BPM | RESPIRATION RATE: 16 BRPM | HEIGHT: 60 IN | DIASTOLIC BLOOD PRESSURE: 76 MMHG | SYSTOLIC BLOOD PRESSURE: 122 MMHG | OXYGEN SATURATION: 100 %

## 2020-01-01 DIAGNOSIS — G35 MULTIPLE SCLEROSIS (H): ICD-10-CM

## 2020-01-01 DIAGNOSIS — I89.0 LYMPHEDEMA: ICD-10-CM

## 2020-01-01 DIAGNOSIS — E43 SEVERE MALNUTRITION (H): ICD-10-CM

## 2020-01-01 DIAGNOSIS — R53.1 WEAKNESS: ICD-10-CM

## 2020-01-01 DIAGNOSIS — E16.2 HYPOGLYCEMIA: ICD-10-CM

## 2020-01-01 DIAGNOSIS — I25.10 CORONARY ARTERY DISEASE INVOLVING NATIVE HEART WITHOUT ANGINA PECTORIS, UNSPECIFIED VESSEL OR LESION TYPE: ICD-10-CM

## 2020-01-01 DIAGNOSIS — R62.7 FAILURE TO THRIVE IN ADULT: ICD-10-CM

## 2020-01-01 DIAGNOSIS — E87.6 HYPOKALEMIA: ICD-10-CM

## 2020-01-01 DIAGNOSIS — E46 PROTEIN-CALORIE MALNUTRITION, UNSPECIFIED SEVERITY (H): ICD-10-CM

## 2020-01-01 DIAGNOSIS — F51.01 PRIMARY INSOMNIA: ICD-10-CM
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
## 2020-01-01 DIAGNOSIS — K76.0 HEPATIC STEATOSIS: ICD-10-CM

## 2020-01-01 DIAGNOSIS — R82.71 ASYMPTOMATIC BACTERIURIA: ICD-10-CM

## 2020-01-01 DIAGNOSIS — R52 PAIN: ICD-10-CM

## 2020-01-01 DIAGNOSIS — T14.8XXA ABRASION: ICD-10-CM

## 2020-01-01 DIAGNOSIS — R60.1 ANASARCA: ICD-10-CM

## 2020-01-01 DIAGNOSIS — E83.42 HYPOMAGNESEMIA: ICD-10-CM

## 2020-01-01 DIAGNOSIS — G35 MS (MULTIPLE SCLEROSIS) (H): Primary | ICD-10-CM

## 2020-01-01 DIAGNOSIS — Z91.81 PERSONAL HISTORY OF FALL: ICD-10-CM

## 2020-01-01 DIAGNOSIS — D72.829 LEUKOCYTOSIS, UNSPECIFIED TYPE: ICD-10-CM

## 2020-01-01 DIAGNOSIS — R60.0 LOCALIZED EDEMA: ICD-10-CM

## 2020-01-01 DIAGNOSIS — F32.A DEPRESSION, UNSPECIFIED DEPRESSION TYPE: ICD-10-CM

## 2020-01-01 DIAGNOSIS — R74.8 ELEVATED CK: ICD-10-CM

## 2020-01-01 DIAGNOSIS — Z20.822 2019-NCOV NOT DETECTED: ICD-10-CM

## 2020-01-01 DIAGNOSIS — J96.01 ACUTE RESPIRATORY FAILURE WITH HYPOXIA (H): ICD-10-CM

## 2020-01-01 DIAGNOSIS — R53.1 GENERALIZED WEAKNESS: ICD-10-CM

## 2020-01-01 DIAGNOSIS — R79.89 ELEVATED BRAIN NATRIURETIC PEPTIDE (BNP) LEVEL: ICD-10-CM

## 2020-01-01 DIAGNOSIS — D53.9 MACROCYTIC ANEMIA: ICD-10-CM
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
## 2020-01-01 DIAGNOSIS — J18.9 PNEUMONIA OF LEFT LOWER LOBE DUE TO INFECTIOUS ORGANISM: ICD-10-CM

## 2020-01-01 DIAGNOSIS — M62.81 GENERALIZED MUSCLE WEAKNESS: ICD-10-CM

## 2020-01-01 DIAGNOSIS — Z20.828 EXPOSURE TO SARS-ASSOCIATED CORONAVIRUS: ICD-10-CM

## 2020-01-01 DIAGNOSIS — E86.1 HYPOTENSION DUE TO HYPOVOLEMIA: Primary | ICD-10-CM

## 2020-01-01 DIAGNOSIS — E87.6 HYPOPOTASSEMIA: ICD-10-CM

## 2020-01-01 DIAGNOSIS — Z03.818 ENCNTR FOR OBS FOR SUSP EXPSR TO OTH BIOLG AGENTS RULED OUT: ICD-10-CM

## 2020-01-01 DIAGNOSIS — R60.0 LOCALIZED EDEMA: Primary | ICD-10-CM

## 2020-01-01 DIAGNOSIS — S50.312A ABRASION OF LEFT ELBOW, INITIAL ENCOUNTER: ICD-10-CM

## 2020-01-01 LAB
ALBUMIN SERPL-MCNC: 1.1 G/DL (ref 3.4–5)
ALBUMIN SERPL-MCNC: 1.3 G/DL (ref 3.5–5)
ALBUMIN SERPL-MCNC: 1.3 G/DL (ref 3.5–5)
ALBUMIN SERPL-MCNC: 1.5 G/DL (ref 3.4–5)
ALBUMIN SERPL-MCNC: 1.5 G/DL (ref 3.5–5)
ALBUMIN SERPL-MCNC: 1.5 G/DL (ref 3.5–5)
ALBUMIN SERPL-MCNC: 1.7 G/DL (ref 3.4–5)
ALBUMIN SERPL-MCNC: 1.7 G/DL (ref 3.4–5)
ALBUMIN SERPL-MCNC: 1.8 G/DL (ref 3.4–5)
ALBUMIN SERPL-MCNC: 1.8 G/DL (ref 3.4–5)
ALBUMIN SERPL-MCNC: 1.9 G/DL (ref 3.4–5)
ALBUMIN SERPL-MCNC: 1.9 G/DL (ref 3.4–5)
ALBUMIN SERPL-MCNC: 2 G/DL (ref 3.4–5)
ALBUMIN SERPL-MCNC: 2.1 G/DL (ref 3.4–5)
ALBUMIN SERPL-MCNC: 2.4 G/DL (ref 3.4–5)
ALBUMIN SERPL-MCNC: 2.4 G/DL (ref 3.4–5)
ALBUMIN SERPL-MCNC: 2.5 G/DL (ref 3.4–5)
ALBUMIN SERPL-MCNC: 2.6 G/DL (ref 3.4–5)
ALBUMIN SERPL-MCNC: NORMAL G/DL (ref 3.4–5)
ALBUMIN UR-MCNC: 10 MG/DL
ALBUMIN UR-MCNC: 30 MG/DL
ALBUMIN UR-MCNC: 30 MG/DL
ALP BONE CFR SERPL: 82 U/L (ref 0–55)
ALP BONE SERPL-MCNC: 41.3 UG/L
ALP LIVER SERPL-CCNC: 308 U/L (ref 0–94)
ALP OTHER CFR SERPL: 0 U/L
ALP SERPL-CCNC: 134 U/L (ref 40–150)
ALP SERPL-CCNC: 135 U/L (ref 40–150)
ALP SERPL-CCNC: 145 U/L (ref 40–150)
ALP SERPL-CCNC: 153 U/L (ref 40–150)
ALP SERPL-CCNC: 162 U/L (ref 40–150)
ALP SERPL-CCNC: 164 U/L (ref 40–150)
ALP SERPL-CCNC: 189 U/L (ref 40–150)
ALP SERPL-CCNC: 191 U/L (ref 40–150)
ALP SERPL-CCNC: 203 U/L (ref 45–120)
ALP SERPL-CCNC: 203 U/L (ref 45–120)
ALP SERPL-CCNC: 244 U/L (ref 40–150)
ALP SERPL-CCNC: 249 U/L (ref 45–120)
ALP SERPL-CCNC: 249 U/L (ref 45–120)
ALP SERPL-CCNC: 308 U/L (ref 40–150)
ALP SERPL-CCNC: 328 U/L (ref 40–150)
ALP SERPL-CCNC: 370 U/L (ref 40–150)
ALP SERPL-CCNC: 373 U/L (ref 40–150)
ALP SERPL-CCNC: 376 U/L (ref 40–150)
ALP SERPL-CCNC: 378 U/L (ref 40–150)
ALP SERPL-CCNC: 386 U/L (ref 40–150)
ALP SERPL-CCNC: 390 U/L (ref 40–120)
ALP SERPL-CCNC: 408 U/L (ref 40–150)
ALP SERPL-CCNC: 417 U/L (ref 40–150)
ALP SERPL-CCNC: 472 U/L (ref 40–150)
ALP SERPL-CCNC: NORMAL U/L (ref 40–150)
ALT SERPL W P-5'-P-CCNC: 15 U/L (ref 0–50)
ALT SERPL W P-5'-P-CCNC: 17 U/L (ref 0–50)
ALT SERPL W P-5'-P-CCNC: 18 U/L (ref 0–50)
ALT SERPL W P-5'-P-CCNC: 19 U/L (ref 0–50)
ALT SERPL W P-5'-P-CCNC: 20 U/L (ref 0–50)
ALT SERPL W P-5'-P-CCNC: 21 U/L (ref 0–50)
ALT SERPL W P-5'-P-CCNC: 22 U/L (ref 0–45)
ALT SERPL W P-5'-P-CCNC: 28 U/L (ref 0–45)
ALT SERPL W P-5'-P-CCNC: 29 U/L (ref 0–50)
ALT SERPL W P-5'-P-CCNC: 41 U/L (ref 0–50)
ALT SERPL W P-5'-P-CCNC: 45 U/L (ref 0–50)
ALT SERPL W P-5'-P-CCNC: 48 U/L (ref 0–50)
ALT SERPL W P-5'-P-CCNC: 48 U/L (ref 0–50)
ALT SERPL W P-5'-P-CCNC: 59 U/L (ref 0–50)
ALT SERPL W P-5'-P-CCNC: 61 U/L (ref 0–50)
ALT SERPL W P-5'-P-CCNC: 65 U/L (ref 0–50)
ALT SERPL W P-5'-P-CCNC: 71 U/L (ref 0–50)
ALT SERPL W P-5'-P-CCNC: 76 U/L (ref 0–50)
ALT SERPL W P-5'-P-CCNC: 83 U/L (ref 0–50)
ALT SERPL W P-5'-P-CCNC: NORMAL U/L (ref 0–50)
ALT SERPL-CCNC: 22 U/L (ref 0–45)
ALT SERPL-CCNC: 28 U/L (ref 0–45)
ANION GAP SERPL CALCULATED.3IONS-SCNC: 10 MMOL/L (ref 3–14)
ANION GAP SERPL CALCULATED.3IONS-SCNC: 10 MMOL/L (ref 5–18)
ANION GAP SERPL CALCULATED.3IONS-SCNC: 10 MMOL/L (ref 5–18)
ANION GAP SERPL CALCULATED.3IONS-SCNC: 12 MMOL/L (ref 3–14)
ANION GAP SERPL CALCULATED.3IONS-SCNC: 13 MMOL/L (ref 3–14)
ANION GAP SERPL CALCULATED.3IONS-SCNC: 14 MMOL/L (ref 3–14)
ANION GAP SERPL CALCULATED.3IONS-SCNC: 16 MMOL/L (ref 3–14)
ANION GAP SERPL CALCULATED.3IONS-SCNC: 24 MMOL/L (ref 3–14)
ANION GAP SERPL CALCULATED.3IONS-SCNC: 25 MMOL/L (ref 3–14)
ANION GAP SERPL CALCULATED.3IONS-SCNC: 4 MMOL/L (ref 3–14)
ANION GAP SERPL CALCULATED.3IONS-SCNC: 6 MMOL/L (ref 3–14)
ANION GAP SERPL CALCULATED.3IONS-SCNC: 6 MMOL/L (ref 5–18)
ANION GAP SERPL CALCULATED.3IONS-SCNC: 6 MMOL/L (ref 5–18)
ANION GAP SERPL CALCULATED.3IONS-SCNC: 7 MMOL/L (ref 3–14)
ANION GAP SERPL CALCULATED.3IONS-SCNC: 8 MMOL/L (ref 3–14)
ANION GAP SERPL CALCULATED.3IONS-SCNC: NORMAL MMOL/L (ref 6–17)
APAP SERPL-MCNC: <2 MG/L (ref 10–20)
APPEARANCE UR: CLEAR
APPEARANCE UR: COLORLESS
APTT PPP: 52 SEC (ref 22–37)
AST SERPL W P-5'-P-CCNC: 108 U/L (ref 0–45)
AST SERPL W P-5'-P-CCNC: 114 U/L (ref 0–45)
AST SERPL W P-5'-P-CCNC: 25 U/L (ref 0–45)
AST SERPL W P-5'-P-CCNC: 32 U/L (ref 0–45)
AST SERPL W P-5'-P-CCNC: 36 U/L (ref 0–45)
AST SERPL W P-5'-P-CCNC: 37 U/L (ref 0–45)
AST SERPL W P-5'-P-CCNC: 51 U/L (ref 0–40)
AST SERPL W P-5'-P-CCNC: 57 U/L (ref 0–45)
AST SERPL W P-5'-P-CCNC: 59 U/L (ref 0–40)
AST SERPL W P-5'-P-CCNC: 64 U/L (ref 0–45)
AST SERPL W P-5'-P-CCNC: 73 U/L (ref 0–45)
AST SERPL W P-5'-P-CCNC: 73 U/L (ref 0–45)
AST SERPL W P-5'-P-CCNC: 76 U/L (ref 0–45)
AST SERPL W P-5'-P-CCNC: 79 U/L (ref 0–45)
AST SERPL W P-5'-P-CCNC: 80 U/L (ref 0–45)
AST SERPL W P-5'-P-CCNC: 83 U/L (ref 0–45)
AST SERPL W P-5'-P-CCNC: 88 U/L (ref 0–45)
AST SERPL W P-5'-P-CCNC: 88 U/L (ref 0–45)
AST SERPL W P-5'-P-CCNC: ABNORMAL U/L (ref 0–45)
AST SERPL W P-5'-P-CCNC: NORMAL U/L (ref 0–45)
AST SERPL-CCNC: 51 U/L (ref 0–40)
AST SERPL-CCNC: 59 U/L (ref 0–40)
BACTERIA #/AREA URNS HPF: ABNORMAL /HPF
BACTERIA #/AREA URNS HPF: ABNORMAL /HPF
BACTERIA SPEC CULT: ABNORMAL
BACTERIA SPEC CULT: ABNORMAL
BACTERIA SPEC CULT: NO GROWTH
BACTERIA SPEC CULT: NO GROWTH
BACTERIA SPEC CULT: NORMAL
BASE DEFICIT BLDA-SCNC: 17.3 MMOL/L
BASE DEFICIT BLDA-SCNC: 19.8 MMOL/L
BASE DEFICIT BLDA-SCNC: 20.7 MMOL/L
BASE DEFICIT BLDV-SCNC: 13.9 MMOL/L
BASE DEFICIT BLDV-SCNC: 16.4 MMOL/L
BASE DEFICIT BLDV-SCNC: 18.1 MMOL/L
BASE DEFICIT BLDV-SCNC: 7.3 MMOL/L
BASOPHILS # BLD AUTO: 0 10E9/L (ref 0–0.2)
BASOPHILS # BLD AUTO: 0 THOU/UL (ref 0–0.2)
BASOPHILS # BLD AUTO: 0.1 10E9/L (ref 0–0.2)
BASOPHILS NFR BLD AUTO: 0.1 %
BASOPHILS NFR BLD AUTO: 0.3 %
BASOPHILS NFR BLD AUTO: 0.4 %
BASOPHILS NFR BLD AUTO: 0.4 %
BASOPHILS NFR BLD AUTO: 0.6 %
BASOPHILS NFR BLD AUTO: 0.7 %
BASOPHILS NFR BLD AUTO: 0.7 %
BASOPHILS NFR BLD AUTO: 0.8 %
BASOPHILS NFR BLD AUTO: 1 % (ref 0–2)
BASOPHILS NFR BLD AUTO: 1.2 %
BASOPHILS NFR BLD AUTO: 1.6 %
BILIRUB DIRECT SERPL-MCNC: 1.1 MG/DL (ref 0–0.2)
BILIRUB SERPL-MCNC: 1.2 MG/DL (ref 0.2–1.3)
BILIRUB SERPL-MCNC: 1.2 MG/DL (ref 0.2–1.3)
BILIRUB SERPL-MCNC: 1.3 MG/DL (ref 0.2–1.3)
BILIRUB SERPL-MCNC: 1.5 MG/DL (ref 0.2–1.3)
BILIRUB SERPL-MCNC: 1.5 MG/DL (ref 0–1)
BILIRUB SERPL-MCNC: 1.5 MG/DL (ref 0–1)
BILIRUB SERPL-MCNC: 1.6 MG/DL (ref 0.2–1.3)
BILIRUB SERPL-MCNC: 1.7 MG/DL (ref 0.2–1.3)
BILIRUB SERPL-MCNC: 1.7 MG/DL (ref 0.2–1.3)
BILIRUB SERPL-MCNC: 1.8 MG/DL (ref 0.2–1.3)
BILIRUB SERPL-MCNC: 1.9 MG/DL (ref 0.2–1.3)
BILIRUB SERPL-MCNC: 1.9 MG/DL (ref 0.2–1.3)
BILIRUB SERPL-MCNC: 2 MG/DL (ref 0.2–1.3)
BILIRUB SERPL-MCNC: 2 MG/DL (ref 0.2–1.3)
BILIRUB SERPL-MCNC: 2.1 MG/DL (ref 0.2–1.3)
BILIRUB SERPL-MCNC: 2.1 MG/DL (ref 0.2–1.3)
BILIRUB SERPL-MCNC: 2.1 MG/DL (ref 0–1)
BILIRUB SERPL-MCNC: 2.1 MG/DL (ref 0–1)
BILIRUB SERPL-MCNC: 2.5 MG/DL (ref 0.2–1.3)
BILIRUB SERPL-MCNC: 2.6 MG/DL (ref 0.2–1.3)
BILIRUB SERPL-MCNC: NORMAL MG/DL (ref 0.2–1.3)
BILIRUB UR QL STRIP: ABNORMAL
BILIRUB UR QL STRIP: NEGATIVE
BUN SERPL-MCNC: 11 MG/DL (ref 7–30)
BUN SERPL-MCNC: 12 MG/DL (ref 7–30)
BUN SERPL-MCNC: 3 MG/DL (ref 7–30)
BUN SERPL-MCNC: 3 MG/DL (ref 7–30)
BUN SERPL-MCNC: 4 MG/DL (ref 7–30)
BUN SERPL-MCNC: 4 MG/DL (ref 8–28)
BUN SERPL-MCNC: 4 MG/DL (ref 8–28)
BUN SERPL-MCNC: 5 MG/DL (ref 7–30)
BUN SERPL-MCNC: 6 MG/DL (ref 7–30)
BUN SERPL-MCNC: 6 MG/DL (ref 8–28)
BUN SERPL-MCNC: 6 MG/DL (ref 8–28)
BUN SERPL-MCNC: 8 MG/DL (ref 7–30)
BUN SERPL-MCNC: 9 MG/DL (ref 7–30)
BUN SERPL-MCNC: NORMAL MG/DL (ref 7–30)
C DIFF TOX B STL QL: NEGATIVE
CA-I BLD-MCNC: 4 MG/DL (ref 4.4–5.2)
CA-I BLD-MCNC: 4.1 MG/DL (ref 4.4–5.2)
CA-I BLD-MCNC: 4.4 MG/DL (ref 4.4–5.2)
CA-I BLD-SCNC: 3.8 MG/DL (ref 4.4–5.2)
CA-I BLD-SCNC: NORMAL MG/DL (ref 4.4–5.2)
CA-I SERPL ISE-MCNC: 4.3 MG/DL (ref 4.4–5.2)
CALCIUM SERPL-MCNC: 6.8 MG/DL (ref 8.5–10.5)
CALCIUM SERPL-MCNC: 6.8 MG/DL (ref 8.5–10.5)
CALCIUM SERPL-MCNC: 7 MG/DL (ref 8.5–10.5)
CALCIUM SERPL-MCNC: 7 MG/DL (ref 8.5–10.5)
CALCIUM SERPL-MCNC: 7.2 MG/DL (ref 8.5–10.1)
CALCIUM SERPL-MCNC: 7.4 MG/DL (ref 8.5–10.1)
CALCIUM SERPL-MCNC: 7.5 MG/DL (ref 8.5–10.1)
CALCIUM SERPL-MCNC: 7.7 MG/DL (ref 8.5–10.1)
CALCIUM SERPL-MCNC: 7.8 MG/DL (ref 8.5–10.1)
CALCIUM SERPL-MCNC: 7.8 MG/DL (ref 8.5–10.1)
CALCIUM SERPL-MCNC: 7.9 MG/DL (ref 8.5–10.1)
CALCIUM SERPL-MCNC: 7.9 MG/DL (ref 8.5–10.1)
CALCIUM SERPL-MCNC: 8 MG/DL (ref 8.5–10.1)
CALCIUM SERPL-MCNC: 8.1 MG/DL (ref 8.5–10.1)
CALCIUM SERPL-MCNC: 8.5 MG/DL (ref 8.5–10.1)
CALCIUM SERPL-MCNC: 8.5 MG/DL (ref 8.5–10.1)
CALCIUM SERPL-MCNC: 8.6 MG/DL (ref 8.5–10.1)
CALCIUM SERPL-MCNC: NORMAL MG/DL (ref 8.5–10.1)
CERULOPLASMIN SERPL-MCNC: 25 MG/DL (ref 20–60)
CHLORIDE BLD-SCNC: 104 MMOL/L (ref 98–107)
CHLORIDE BLD-SCNC: 105 MMOL/L (ref 98–107)
CHLORIDE SERPL-SCNC: 102 MMOL/L (ref 94–109)
CHLORIDE SERPL-SCNC: 103 MMOL/L (ref 94–109)
CHLORIDE SERPL-SCNC: 104 MMOL/L (ref 94–109)
CHLORIDE SERPL-SCNC: 106 MMOL/L (ref 94–109)
CHLORIDE SERPL-SCNC: 106 MMOL/L (ref 94–109)
CHLORIDE SERPL-SCNC: 107 MMOL/L (ref 94–109)
CHLORIDE SERPL-SCNC: 108 MMOL/L (ref 94–109)
CHLORIDE SERPL-SCNC: 109 MMOL/L (ref 94–109)
CHLORIDE SERPL-SCNC: 110 MMOL/L (ref 94–109)
CHLORIDE SERPL-SCNC: 111 MMOL/L (ref 94–109)
CHLORIDE SERPL-SCNC: 112 MMOL/L (ref 94–109)
CHLORIDE SERPL-SCNC: 112 MMOL/L (ref 94–109)
CHLORIDE SERPL-SCNC: 93 MMOL/L (ref 94–109)
CHLORIDE SERPL-SCNC: 93 MMOL/L (ref 94–109)
CHLORIDE SERPL-SCNC: 98 MMOL/L (ref 94–109)
CHLORIDE SERPL-SCNC: NORMAL MMOL/L (ref 94–109)
CHLORIDE SERPLBLD-SCNC: 104 MMOL/L (ref 98–107)
CHLORIDE SERPLBLD-SCNC: 105 MMOL/L (ref 98–107)
CHLORIDE STL-SCNC: NORMAL MMOL/L
CK SERPL-CCNC: 248 U/L (ref 30–225)
CK SERPL-CCNC: 29 U/L (ref 30–190)
CO2 BLDCOV-SCNC: 32 MMOL/L (ref 21–28)
CO2 SERPL-SCNC: 13 MMOL/L (ref 20–32)
CO2 SERPL-SCNC: 16 MMOL/L (ref 20–32)
CO2 SERPL-SCNC: 19 MMOL/L (ref 20–32)
CO2 SERPL-SCNC: 23 MMOL/L (ref 20–32)
CO2 SERPL-SCNC: 24 MMOL/L (ref 20–32)
CO2 SERPL-SCNC: 25 MMOL/L (ref 20–32)
CO2 SERPL-SCNC: 25 MMOL/L (ref 20–32)
CO2 SERPL-SCNC: 25 MMOL/L (ref 22–31)
CO2 SERPL-SCNC: 25 MMOL/L (ref 22–31)
CO2 SERPL-SCNC: 26 MMOL/L (ref 20–32)
CO2 SERPL-SCNC: 26 MMOL/L (ref 20–32)
CO2 SERPL-SCNC: 27 MMOL/L (ref 20–32)
CO2 SERPL-SCNC: 27 MMOL/L (ref 20–32)
CO2 SERPL-SCNC: 27 MMOL/L (ref 22–31)
CO2 SERPL-SCNC: 27 MMOL/L (ref 22–31)
CO2 SERPL-SCNC: 28 MMOL/L (ref 20–32)
CO2 SERPL-SCNC: 29 MMOL/L (ref 20–32)
CO2 SERPL-SCNC: 29 MMOL/L (ref 20–32)
CO2 SERPL-SCNC: 30 MMOL/L (ref 20–32)
CO2 SERPL-SCNC: 30 MMOL/L (ref 20–32)
CO2 SERPL-SCNC: 9 MMOL/L (ref 20–32)
CO2 SERPL-SCNC: NORMAL MMOL/L (ref 20–32)
COLOR UR AUTO: ABNORMAL
COLOR UR AUTO: YELLOW
COPATH REPORT: NORMAL
CORTIS SERPL-MCNC: 13.4 UG/DL (ref 4–22)
CORTIS SERPL-MCNC: 19 UG/DL (ref 4–22)
CORTIS SERPL-MCNC: 26.1 UG/DL (ref 4–22)
CORTIS SERPL-MCNC: 9.2 UG/DL (ref 4–22)
CPB APPLIED: NORMAL
CREAT SERPL-MCNC: 0.45 MG/DL (ref 0.52–1.04)
CREAT SERPL-MCNC: 0.47 MG/DL (ref 0.52–1.04)
CREAT SERPL-MCNC: 0.48 MG/DL (ref 0.6–1.1)
CREAT SERPL-MCNC: 0.48 MG/DL (ref 0.6–1.1)
CREAT SERPL-MCNC: 0.5 MG/DL (ref 0.52–1.04)
CREAT SERPL-MCNC: 0.5 MG/DL (ref 0.6–1.1)
CREAT SERPL-MCNC: 0.5 MG/DL (ref 0.6–1.1)
CREAT SERPL-MCNC: 0.52 MG/DL (ref 0.52–1.04)
CREAT SERPL-MCNC: 0.55 MG/DL (ref 0.52–1.04)
CREAT SERPL-MCNC: 0.56 MG/DL (ref 0.52–1.04)
CREAT SERPL-MCNC: 0.56 MG/DL (ref 0.52–1.04)
CREAT SERPL-MCNC: 0.58 MG/DL (ref 0.52–1.04)
CREAT SERPL-MCNC: 0.59 MG/DL (ref 0.52–1.04)
CREAT SERPL-MCNC: 0.6 MG/DL (ref 0.52–1.04)
CREAT SERPL-MCNC: 0.61 MG/DL (ref 0.52–1.04)
CREAT SERPL-MCNC: 0.64 MG/DL (ref 0.52–1.04)
CREAT SERPL-MCNC: 0.64 MG/DL (ref 0.52–1.04)
CREAT SERPL-MCNC: 0.67 MG/DL (ref 0.52–1.04)
CREAT SERPL-MCNC: 0.69 MG/DL (ref 0.52–1.04)
CREAT SERPL-MCNC: 0.72 MG/DL (ref 0.52–1.04)
CREAT SERPL-MCNC: 0.84 MG/DL (ref 0.52–1.04)
CREAT SERPL-MCNC: ABNORMAL MG/DL (ref 0.52–1.04)
CREAT SERPL-MCNC: NORMAL MG/DL (ref 0.52–1.04)
CRP SERPL-MCNC: 8.7 MG/L (ref 0–8)
CRP SERPL-MCNC: 9.4 MG/L (ref 0–8)
D DIMER PPP FEU-MCNC: 0.4 UG/ML FEU (ref 0–0.5)
DEPRECATED CALCIDIOL+CALCIFEROL SERPL-MC: 35 UG/L (ref 20–75)
DIFFERENTIAL METHOD BLD: ABNORMAL
DIFFERENTIAL METHOD BLD: NORMAL
DIFFERENTIAL: ABNORMAL
EOSINOPHIL # BLD AUTO: 0 10E9/L (ref 0–0.7)
EOSINOPHIL # BLD AUTO: 0.1 10E9/L (ref 0–0.7)
EOSINOPHIL # BLD AUTO: 0.2 10E9/L (ref 0–0.7)
EOSINOPHIL COUNT (ABSOLUTE): 0.3 THOU/UL (ref 0–0.4)
EOSINOPHIL NFR BLD AUTO: 0 %
EOSINOPHIL NFR BLD AUTO: 0 %
EOSINOPHIL NFR BLD AUTO: 0.2 %
EOSINOPHIL NFR BLD AUTO: 0.4 %
EOSINOPHIL NFR BLD AUTO: 0.4 %
EOSINOPHIL NFR BLD AUTO: 0.8 %
EOSINOPHIL NFR BLD AUTO: 0.9 %
EOSINOPHIL NFR BLD AUTO: 0.9 %
EOSINOPHIL NFR BLD AUTO: 2.2 %
EOSINOPHIL NFR BLD AUTO: 4.2 %
EOSINOPHIL NFR BLD AUTO: 9 % (ref 0–6)
ERYTHROCYTE [DISTWIDTH] IN BLOOD BY AUTOMATED COUNT: 14.1 % (ref 10–15)
ERYTHROCYTE [DISTWIDTH] IN BLOOD BY AUTOMATED COUNT: 14.4 % (ref 10–15)
ERYTHROCYTE [DISTWIDTH] IN BLOOD BY AUTOMATED COUNT: 14.5 % (ref 10–15)
ERYTHROCYTE [DISTWIDTH] IN BLOOD BY AUTOMATED COUNT: 14.6 % (ref 10–15)
ERYTHROCYTE [DISTWIDTH] IN BLOOD BY AUTOMATED COUNT: 14.6 % (ref 10–15)
ERYTHROCYTE [DISTWIDTH] IN BLOOD BY AUTOMATED COUNT: 14.7 % (ref 10–15)
ERYTHROCYTE [DISTWIDTH] IN BLOOD BY AUTOMATED COUNT: 14.8 % (ref 10–15)
ERYTHROCYTE [DISTWIDTH] IN BLOOD BY AUTOMATED COUNT: 14.9 % (ref 10–15)
ERYTHROCYTE [DISTWIDTH] IN BLOOD BY AUTOMATED COUNT: 14.9 % (ref 11–14.5)
ERYTHROCYTE [DISTWIDTH] IN BLOOD BY AUTOMATED COUNT: 14.9 % (ref 11–14.5)
ERYTHROCYTE [DISTWIDTH] IN BLOOD BY AUTOMATED COUNT: 15.8 % (ref 10–15)
ERYTHROCYTE [DISTWIDTH] IN BLOOD BY AUTOMATED COUNT: 15.8 % (ref 11–14.5)
ERYTHROCYTE [DISTWIDTH] IN BLOOD BY AUTOMATED COUNT: 15.8 % (ref 11–14.5)
ERYTHROCYTE [DISTWIDTH] IN BLOOD BY AUTOMATED COUNT: 16.1 % (ref 10–15)
ERYTHROCYTE [DISTWIDTH] IN BLOOD BY AUTOMATED COUNT: 16.5 % (ref 10–15)
ERYTHROCYTE [DISTWIDTH] IN BLOOD BY AUTOMATED COUNT: 16.6 % (ref 10–15)
ERYTHROCYTE [DISTWIDTH] IN BLOOD BY AUTOMATED COUNT: 16.9 % (ref 10–15)
ERYTHROCYTE [DISTWIDTH] IN BLOOD BY AUTOMATED COUNT: 17 % (ref 10–15)
ERYTHROCYTE [DISTWIDTH] IN BLOOD BY AUTOMATED COUNT: NORMAL % (ref 10–15)
ERYTHROCYTE [SEDIMENTATION RATE] IN BLOOD BY WESTERGREN METHOD: 22 MM/H (ref 0–30)
ETHANOL SERPL-MCNC: <0.01 G/DL
ETHANOL SERPL-MCNC: <0.01 G/DL
FAT STL QL: NORMAL
FIBRINOGEN PPP-MCNC: 182 MG/DL (ref 200–420)
FOLATE SERPL-MCNC: 29.5 NG/ML
FOLATE SERPL-MCNC: 7.6 NG/ML
GFR SERPL CREATININE-BSD FRML MDRD: 68 ML/MIN/{1.73_M2}
GFR SERPL CREATININE-BSD FRML MDRD: 82 ML/MIN/{1.73_M2}
GFR SERPL CREATININE-BSD FRML MDRD: 85 ML/MIN/{1.73_M2}
GFR SERPL CREATININE-BSD FRML MDRD: 86 ML/MIN/{1.73_M2}
GFR SERPL CREATININE-BSD FRML MDRD: 87 ML/MIN/{1.73_M2}
GFR SERPL CREATININE-BSD FRML MDRD: 88 ML/MIN/{1.73_M2}
GFR SERPL CREATININE-BSD FRML MDRD: 89 ML/MIN/{1.73_M2}
GFR SERPL CREATININE-BSD FRML MDRD: 90 ML/MIN/{1.73_M2}
GFR SERPL CREATININE-BSD FRML MDRD: 90 ML/MIN/{1.73_M2}
GFR SERPL CREATININE-BSD FRML MDRD: >60 ML/MIN/1.73M2
GFR SERPL CREATININE-BSD FRML MDRD: >90 ML/MIN/{1.73_M2}
GFR SERPL CREATININE-BSD FRML MDRD: ABNORMAL ML/MIN/{1.73_M2}
GFR SERPL CREATININE-BSD FRML MDRD: NORMAL ML/MIN/{1.73_M2}
GLUCOSE BLD-MCNC: 53 MG/DL (ref 70–125)
GLUCOSE BLD-MCNC: 55 MG/DL (ref 70–99)
GLUCOSE BLD-MCNC: 60 MG/DL (ref 70–125)
GLUCOSE BLD-MCNC: NORMAL MG/DL (ref 70–99)
GLUCOSE BLDC GLUCOMTR-MCNC: 102 MG/DL (ref 70–99)
GLUCOSE BLDC GLUCOMTR-MCNC: 106 MG/DL (ref 70–99)
GLUCOSE BLDC GLUCOMTR-MCNC: 114 MG/DL (ref 70–99)
GLUCOSE BLDC GLUCOMTR-MCNC: 117 MG/DL (ref 70–99)
GLUCOSE BLDC GLUCOMTR-MCNC: 122 MG/DL (ref 70–99)
GLUCOSE BLDC GLUCOMTR-MCNC: 214 MG/DL (ref 70–99)
GLUCOSE BLDC GLUCOMTR-MCNC: 248 MG/DL (ref 70–99)
GLUCOSE BLDC GLUCOMTR-MCNC: 54 MG/DL (ref 70–99)
GLUCOSE BLDC GLUCOMTR-MCNC: 57 MG/DL (ref 70–99)
GLUCOSE BLDC GLUCOMTR-MCNC: 57 MG/DL (ref 70–99)
GLUCOSE BLDC GLUCOMTR-MCNC: 60 MG/DL (ref 70–99)
GLUCOSE BLDC GLUCOMTR-MCNC: 68 MG/DL (ref 70–99)
GLUCOSE BLDC GLUCOMTR-MCNC: 68 MG/DL (ref 70–99)
GLUCOSE BLDC GLUCOMTR-MCNC: 70 MG/DL (ref 70–99)
GLUCOSE BLDC GLUCOMTR-MCNC: 71 MG/DL (ref 70–99)
GLUCOSE BLDC GLUCOMTR-MCNC: 74 MG/DL (ref 70–99)
GLUCOSE BLDC GLUCOMTR-MCNC: 82 MG/DL (ref 70–99)
GLUCOSE BLDC GLUCOMTR-MCNC: 83 MG/DL (ref 70–99)
GLUCOSE BLDC GLUCOMTR-MCNC: 87 MG/DL (ref 70–99)
GLUCOSE BLDC GLUCOMTR-MCNC: 88 MG/DL (ref 70–99)
GLUCOSE BLDC GLUCOMTR-MCNC: 88 MG/DL (ref 70–99)
GLUCOSE BLDC GLUCOMTR-MCNC: 90 MG/DL (ref 70–99)
GLUCOSE BLDC GLUCOMTR-MCNC: 92 MG/DL (ref 70–99)
GLUCOSE BLDC GLUCOMTR-MCNC: 92 MG/DL (ref 70–99)
GLUCOSE BLDC GLUCOMTR-MCNC: 98 MG/DL (ref 70–99)
GLUCOSE BLDC GLUCOMTR-MCNC: 99 MG/DL (ref 70–99)
GLUCOSE SERPL-MCNC: 101 MG/DL (ref 70–99)
GLUCOSE SERPL-MCNC: 106 MG/DL (ref 70–99)
GLUCOSE SERPL-MCNC: 110 MG/DL (ref 70–99)
GLUCOSE SERPL-MCNC: 115 MG/DL (ref 70–99)
GLUCOSE SERPL-MCNC: 51 MG/DL (ref 70–99)
GLUCOSE SERPL-MCNC: 52 MG/DL (ref 70–99)
GLUCOSE SERPL-MCNC: 52 MG/DL (ref 70–99)
GLUCOSE SERPL-MCNC: 53 MG/DL (ref 70–125)
GLUCOSE SERPL-MCNC: 60 MG/DL (ref 70–125)
GLUCOSE SERPL-MCNC: 60 MG/DL (ref 70–99)
GLUCOSE SERPL-MCNC: 62 MG/DL (ref 70–99)
GLUCOSE SERPL-MCNC: 69 MG/DL (ref 70–99)
GLUCOSE SERPL-MCNC: 69 MG/DL (ref 70–99)
GLUCOSE SERPL-MCNC: 71 MG/DL (ref 70–99)
GLUCOSE SERPL-MCNC: 75 MG/DL (ref 70–99)
GLUCOSE SERPL-MCNC: 76 MG/DL (ref 70–99)
GLUCOSE SERPL-MCNC: 78 MG/DL (ref 70–99)
GLUCOSE SERPL-MCNC: 85 MG/DL (ref 70–99)
GLUCOSE SERPL-MCNC: 86 MG/DL (ref 70–99)
GLUCOSE SERPL-MCNC: 88 MG/DL (ref 70–99)
GLUCOSE SERPL-MCNC: 92 MG/DL (ref 70–99)
GLUCOSE SERPL-MCNC: NORMAL MG/DL (ref 70–99)
GLUCOSE UR STRIP-MCNC: NEGATIVE MG/DL
GRAM STN SPEC: ABNORMAL
HBV SURFACE AB SERPL IA-ACNC: 0.11 M[IU]/ML
HBV SURFACE AG SERPL QL IA: NONREACTIVE
HCO3 BLD-SCNC: 10 MMOL/L (ref 21–28)
HCO3 BLD-SCNC: 8 MMOL/L (ref 21–28)
HCO3 BLD-SCNC: 9 MMOL/L (ref 21–28)
HCO3 BLDV-SCNC: 11 MMOL/L (ref 21–28)
HCO3 BLDV-SCNC: 12 MMOL/L (ref 21–28)
HCO3 BLDV-SCNC: 16 MMOL/L (ref 21–28)
HCO3 BLDV-SCNC: 24 MMOL/L (ref 21–28)
HCO3 BLDV-SCNC: NORMAL MMOL/L (ref 21–28)
HCT VFR BLD AUTO: 22.6 % (ref 35–47)
HCT VFR BLD AUTO: 25.8 % (ref 35–47)
HCT VFR BLD AUTO: 26.2 % (ref 35–47)
HCT VFR BLD AUTO: 28.1 % (ref 35–47)
HCT VFR BLD AUTO: 28.1 % (ref 35–47)
HCT VFR BLD AUTO: 28.3 % (ref 35–47)
HCT VFR BLD AUTO: 28.4 % (ref 35–47)
HCT VFR BLD AUTO: 28.4 % (ref 35–47)
HCT VFR BLD AUTO: 28.7 % (ref 35–47)
HCT VFR BLD AUTO: 29 % (ref 35–47)
HCT VFR BLD AUTO: 29.6 % (ref 35–47)
HCT VFR BLD AUTO: 30.8 % (ref 35–47)
HCT VFR BLD AUTO: 31.4 % (ref 35–47)
HCT VFR BLD AUTO: 32 % (ref 35–47)
HCT VFR BLD AUTO: 32 % (ref 35–47)
HCT VFR BLD AUTO: 32.8 % (ref 35–47)
HCT VFR BLD AUTO: 32.9 % (ref 35–47)
HCT VFR BLD AUTO: 33 % (ref 35–47)
HCT VFR BLD AUTO: 33.7 % (ref 35–47)
HCT VFR BLD AUTO: 34.6 % (ref 35–47)
HCT VFR BLD AUTO: 34.6 % (ref 35–47)
HCT VFR BLD AUTO: 36.4 % (ref 35–47)
HCT VFR BLD AUTO: 37 % (ref 35–47)
HCT VFR BLD AUTO: NORMAL % (ref 35–47)
HCT VFR BLD CALC: 42 %PCV (ref 35–47)
HCT VFR BLD CALC: NORMAL %PCV (ref 35–47)
HCV AB SERPL QL IA: NONREACTIVE
HEMOGLOBIN: 9.2 G/DL (ref 12–16)
HEMOGLOBIN: 9.2 G/DL (ref 12–16)
HGB BLD CALC-MCNC: 14.3 G/DL (ref 11.7–15.7)
HGB BLD-MCNC: 10.3 G/DL (ref 11.7–15.7)
HGB BLD-MCNC: 10.4 G/DL (ref 11.7–15.7)
HGB BLD-MCNC: 10.6 G/DL (ref 11.7–15.7)
HGB BLD-MCNC: 10.7 G/DL (ref 11.7–15.7)
HGB BLD-MCNC: 10.8 G/DL (ref 11.7–15.7)
HGB BLD-MCNC: 10.9 G/DL (ref 11.7–15.7)
HGB BLD-MCNC: 11.2 G/DL (ref 11.7–15.7)
HGB BLD-MCNC: 11.3 G/DL (ref 11.7–15.7)
HGB BLD-MCNC: 12.1 G/DL (ref 11.7–15.7)
HGB BLD-MCNC: 12.4 G/DL (ref 11.7–15.7)
HGB BLD-MCNC: 7 G/DL (ref 11.7–15.7)
HGB BLD-MCNC: 8.2 G/DL (ref 11.7–15.7)
HGB BLD-MCNC: 8.3 G/DL (ref 11.7–15.7)
HGB BLD-MCNC: 8.9 G/DL (ref 11.7–15.7)
HGB BLD-MCNC: 9.1 G/DL (ref 11.7–15.7)
HGB BLD-MCNC: 9.1 G/DL (ref 11.7–15.7)
HGB BLD-MCNC: 9.2 G/DL (ref 11.7–15.7)
HGB BLD-MCNC: 9.2 G/DL (ref 12–16)
HGB BLD-MCNC: 9.2 G/DL (ref 12–16)
HGB BLD-MCNC: 9.5 G/DL (ref 11.7–15.7)
HGB BLD-MCNC: 9.5 G/DL (ref 11.7–15.7)
HGB BLD-MCNC: NORMAL G/DL (ref 11.7–15.7)
HGB BLD-MCNC: NORMAL G/DL (ref 11.7–15.7)
HGB UR QL STRIP: ABNORMAL
HGB UR QL STRIP: NEGATIVE
HYALINE CASTS #/AREA URNS LPF: 17 /LPF (ref 0–2)
HYALINE CASTS #/AREA URNS LPF: 4 /LPF (ref 0–2)
HYALINE CASTS #/AREA URNS LPF: 56 /LPF (ref 0–2)
IMM GRANULOCYTES # BLD: 0 10E9/L (ref 0–0.4)
IMM GRANULOCYTES # BLD: 0.1 10E9/L (ref 0–0.4)
IMM GRANULOCYTES NFR BLD: 0.4 %
IMM GRANULOCYTES NFR BLD: 0.4 %
IMM GRANULOCYTES NFR BLD: 0.5 %
IMM GRANULOCYTES NFR BLD: 0.5 %
IMM GRANULOCYTES NFR BLD: 0.8 %
IMM GRANULOCYTES NFR BLD: 0.9 %
IMM GRANULOCYTES NFR BLD: 1 %
IMM GRANULOCYTES NFR BLD: 1.1 %
IMMATURE GRANULOCYTE % - MAN (DIFF): 0 %
IMMATURE GRANULOCYTE ABSOLUTE - MAN (DIFF): 0 THOU/UL
INR PPP: 1.1 (ref 0.86–1.14)
INR PPP: 1.29 (ref 0.86–1.14)
INR PPP: 1.33 (ref 0.9–1.1)
INR PPP: 1.33 (ref 0.9–1.1)
INR PPP: 1.45 (ref 0.9–1.1)
INR PPP: 1.45 (ref 0.9–1.1)
INR PPP: 1.6 (ref 0.86–1.14)
INR PPP: 1.67 (ref 0.86–1.14)
INR PPP: 2.43 (ref 0.86–1.14)
INR PPP: 3.21 (ref 0.86–1.14)
INTERPRETATION ECG - MUSE: NORMAL
KETONES UR STRIP-MCNC: 10 MG/DL
KETONES UR STRIP-MCNC: 40 MG/DL
KETONES UR STRIP-MCNC: NEGATIVE MG/DL
L PNEUMO1 AG UR QL IA: NORMAL
LABORATORY COMMENT REPORT: NORMAL
LACTATE BLD-SCNC: 1.1 MMOL/L (ref 0.7–2)
LACTATE BLD-SCNC: 1.2 MMOL/L (ref 0.7–2)
LACTATE BLD-SCNC: 1.3 MMOL/L (ref 0.7–2)
LACTATE BLD-SCNC: 1.5 MMOL/L (ref 0.7–2)
LACTATE BLD-SCNC: 1.5 MMOL/L (ref 0.7–2)
LACTATE BLD-SCNC: 10.7 MMOL/L (ref 0.7–2)
LACTATE BLD-SCNC: 11.1 MMOL/L (ref 0.7–2)
LACTATE BLD-SCNC: 11.5 MMOL/L (ref 0.7–2)
LACTATE BLD-SCNC: 15 MMOL/L (ref 0.7–2)
LACTATE BLD-SCNC: 18 MMOL/L (ref 0.7–2)
LACTATE BLD-SCNC: 2 MMOL/L (ref 0.7–2)
LACTATE BLD-SCNC: 2.8 MMOL/L (ref 0.7–2)
LACTATE BLD-SCNC: 9.4 MMOL/L (ref 0.7–2)
LACTATE BLD-SCNC: NORMAL MMOL/L (ref 0.7–2)
LEUKOCYTE ESTERASE UR QL STRIP: ABNORMAL
LEUKOCYTE ESTERASE UR QL STRIP: NEGATIVE
LIPASE SERPL-CCNC: 10 U/L (ref 73–393)
LIPASE SERPL-CCNC: <10 U/L (ref 73–393)
LIPASE SERPL-CCNC: <10 U/L (ref 73–393)
LYMPHOCYTES # BLD AUTO: 0.4 10E9/L (ref 0.8–5.3)
LYMPHOCYTES # BLD AUTO: 0.6 10E9/L (ref 0.8–5.3)
LYMPHOCYTES # BLD AUTO: 0.8 10E9/L (ref 0.8–5.3)
LYMPHOCYTES # BLD AUTO: 0.8 10E9/L (ref 0.8–5.3)
LYMPHOCYTES # BLD AUTO: 0.8 THOU/UL (ref 0.8–4.4)
LYMPHOCYTES # BLD AUTO: 0.9 10E9/L (ref 0.8–5.3)
LYMPHOCYTES # BLD AUTO: 1 10E9/L (ref 0.8–5.3)
LYMPHOCYTES # BLD AUTO: 1.2 10E9/L (ref 0.8–5.3)
LYMPHOCYTES # BLD AUTO: 1.3 10E9/L (ref 0.8–5.3)
LYMPHOCYTES NFR BLD AUTO: 11.3 %
LYMPHOCYTES NFR BLD AUTO: 13.2 %
LYMPHOCYTES NFR BLD AUTO: 13.6 %
LYMPHOCYTES NFR BLD AUTO: 19.5 %
LYMPHOCYTES NFR BLD AUTO: 2.7 %
LYMPHOCYTES NFR BLD AUTO: 23.2 %
LYMPHOCYTES NFR BLD AUTO: 23.5 %
LYMPHOCYTES NFR BLD AUTO: 24 % (ref 20–40)
LYMPHOCYTES NFR BLD AUTO: 4 %
LYMPHOCYTES NFR BLD AUTO: 6.8 %
LYMPHOCYTES NFR BLD AUTO: 7.7 %
Lab: ABNORMAL
Lab: NORMAL
Lab: NORMAL
MAGNESIUM SERPL-MCNC: 1.3 MG/DL (ref 1.6–2.3)
MAGNESIUM SERPL-MCNC: 1.3 MG/DL (ref 1.8–2.6)
MAGNESIUM SERPL-MCNC: 1.4 MG/DL (ref 1.6–2.3)
MAGNESIUM SERPL-MCNC: 1.4 MG/DL (ref 1.6–2.3)
MAGNESIUM SERPL-MCNC: 1.6 MG/DL (ref 1.6–2.3)
MAGNESIUM SERPL-MCNC: 1.6 MG/DL (ref 1.6–2.3)
MAGNESIUM SERPL-MCNC: 1.7 MG/DL (ref 1.6–2.3)
MAGNESIUM SERPL-MCNC: 1.8 MG/DL (ref 1.6–2.3)
MAGNESIUM SERPL-MCNC: 1.8 MG/DL (ref 1.6–2.3)
MAGNESIUM SERPL-MCNC: 1.9 MG/DL (ref 1.6–2.3)
MAGNESIUM SERPL-MCNC: 2 MG/DL (ref 1.6–2.3)
MAGNESIUM SERPL-MCNC: 2 MG/DL (ref 1.6–2.3)
MAGNESIUM SERPL-MCNC: 2.2 MG/DL (ref 1.6–2.3)
MAGNESIUM SERPL-MCNC: 2.3 MG/DL (ref 1.6–2.3)
MAGNESIUM SERPL-MCNC: 2.3 MG/DL (ref 1.6–2.3)
MAGNESIUM SERPL-MCNC: 2.7 MG/DL (ref 1.6–2.3)
MAGNESIUM SERPL-MCNC: 3.1 MG/DL (ref 1.6–2.3)
MAGNESIUM SERPL-MCNC: NORMAL MG/DL (ref 1.6–2.3)
MCH RBC QN AUTO: 38.1 PG (ref 26.5–33)
MCH RBC QN AUTO: 38.2 PG (ref 26.5–33)
MCH RBC QN AUTO: 38.2 PG (ref 26.5–33)
MCH RBC QN AUTO: 38.3 PG (ref 26.5–33)
MCH RBC QN AUTO: 38.4 PG (ref 26.5–33)
MCH RBC QN AUTO: 38.5 PG (ref 26.5–33)
MCH RBC QN AUTO: 38.6 PG (ref 26.5–33)
MCH RBC QN AUTO: 38.7 PG (ref 26.5–33)
MCH RBC QN AUTO: 38.8 PG (ref 26.5–33)
MCH RBC QN AUTO: 38.8 PG (ref 26.5–33)
MCH RBC QN AUTO: 39.3 PG (ref 27–34)
MCH RBC QN AUTO: 39.3 PG (ref 27–34)
MCH RBC QN AUTO: 39.4 PG (ref 26.5–33)
MCH RBC QN AUTO: 39.6 PG (ref 26.5–33)
MCH RBC QN AUTO: 39.7 PG (ref 27–34)
MCH RBC QN AUTO: 39.7 PG (ref 27–34)
MCH RBC QN AUTO: 39.9 PG (ref 26.5–33)
MCH RBC QN AUTO: 40.3 PG (ref 26.5–33)
MCH RBC QN AUTO: 40.4 PG (ref 26.5–33)
MCH RBC QN AUTO: 40.5 PG (ref 26.5–33)
MCH RBC QN AUTO: 40.8 PG (ref 26.5–33)
MCH RBC QN AUTO: NORMAL PG (ref 26.5–33)
MCHC RBC AUTO-ENTMCNC: 29.3 G/DL (ref 31.5–36.5)
MCHC RBC AUTO-ENTMCNC: 29.7 G/DL (ref 31.5–36.5)
MCHC RBC AUTO-ENTMCNC: 30.1 G/DL (ref 31.5–36.5)
MCHC RBC AUTO-ENTMCNC: 30.9 G/DL (ref 31.5–36.5)
MCHC RBC AUTO-ENTMCNC: 31 G/DL (ref 31.5–36.5)
MCHC RBC AUTO-ENTMCNC: 31.4 G/DL (ref 31.5–36.5)
MCHC RBC AUTO-ENTMCNC: 31.5 G/DL (ref 31.5–36.5)
MCHC RBC AUTO-ENTMCNC: 31.7 G/DL (ref 31.5–36.5)
MCHC RBC AUTO-ENTMCNC: 31.8 G/DL (ref 31.5–36.5)
MCHC RBC AUTO-ENTMCNC: 32.2 G/DL (ref 31.5–36.5)
MCHC RBC AUTO-ENTMCNC: 32.3 G/DL (ref 31.5–36.5)
MCHC RBC AUTO-ENTMCNC: 32.4 G/DL (ref 31.5–36.5)
MCHC RBC AUTO-ENTMCNC: 32.4 G/DL (ref 32–36)
MCHC RBC AUTO-ENTMCNC: 32.4 G/DL (ref 32–36)
MCHC RBC AUTO-ENTMCNC: 32.7 G/DL (ref 31.5–36.5)
MCHC RBC AUTO-ENTMCNC: 32.7 G/DL (ref 31.5–36.5)
MCHC RBC AUTO-ENTMCNC: 32.7 G/DL (ref 32–36)
MCHC RBC AUTO-ENTMCNC: 32.7 G/DL (ref 32–36)
MCHC RBC AUTO-ENTMCNC: 32.8 G/DL (ref 31.5–36.5)
MCHC RBC AUTO-ENTMCNC: 33.1 G/DL (ref 31.5–36.5)
MCHC RBC AUTO-ENTMCNC: 33.4 G/DL (ref 31.5–36.5)
MCHC RBC AUTO-ENTMCNC: 34.1 G/DL (ref 31.5–36.5)
MCHC RBC AUTO-ENTMCNC: 35 G/DL (ref 31.5–36.5)
MCHC RBC AUTO-ENTMCNC: NORMAL G/DL (ref 31.5–36.5)
MCV RBC AUTO: 113 FL (ref 78–100)
MCV RBC AUTO: 118 FL (ref 78–100)
MCV RBC AUTO: 120 FL (ref 78–100)
MCV RBC AUTO: 120 FL (ref 80–100)
MCV RBC AUTO: 120 FL (ref 80–100)
MCV RBC AUTO: 121 FL (ref 78–100)
MCV RBC AUTO: 122 FL (ref 78–100)
MCV RBC AUTO: 122 FL (ref 80–100)
MCV RBC AUTO: 122 FL (ref 80–100)
MCV RBC AUTO: 123 FL (ref 78–100)
MCV RBC AUTO: 123 FL (ref 78–100)
MCV RBC AUTO: 124 FL (ref 78–100)
MCV RBC AUTO: 125 FL (ref 78–100)
MCV RBC AUTO: 126 FL (ref 78–100)
MCV RBC AUTO: 128 FL (ref 78–100)
MCV RBC AUTO: 129 FL (ref 78–100)
MCV RBC AUTO: 130 FL (ref 78–100)
MCV RBC AUTO: NORMAL FL (ref 78–100)
MONOCYTES # BLD AUTO: 0.4 10E9/L (ref 0–1.3)
MONOCYTES # BLD AUTO: 0.4 10E9/L (ref 0–1.3)
MONOCYTES # BLD AUTO: 0.4 THOU/UL (ref 0–0.9)
MONOCYTES # BLD AUTO: 0.5 10E9/L (ref 0–1.3)
MONOCYTES # BLD AUTO: 0.6 10E9/L (ref 0–1.3)
MONOCYTES # BLD AUTO: 0.6 10E9/L (ref 0–1.3)
MONOCYTES # BLD AUTO: 0.7 10E9/L (ref 0–1.3)
MONOCYTES # BLD AUTO: 1.1 10E9/L (ref 0–1.3)
MONOCYTES NFR BLD AUTO: 10 %
MONOCYTES NFR BLD AUTO: 11 % (ref 2–10)
MONOCYTES NFR BLD AUTO: 12.5 %
MONOCYTES NFR BLD AUTO: 13.5 %
MONOCYTES NFR BLD AUTO: 4.3 %
MONOCYTES NFR BLD AUTO: 5 %
MONOCYTES NFR BLD AUTO: 6.5 %
MONOCYTES NFR BLD AUTO: 7.1 %
MONOCYTES NFR BLD AUTO: 7.7 %
MONOCYTES NFR BLD AUTO: 8.1 %
MONOCYTES NFR BLD AUTO: 8.9 %
MUCOUS THREADS #/AREA URNS LPF: PRESENT /LPF
MUCOUS THREADS #/AREA URNS LPF: PRESENT /LPF
NEUTRAL FAT STL QL: NORMAL
NEUTROPHILS # BLD AUTO: 13.2 10E9/L (ref 1.6–8.3)
NEUTROPHILS # BLD AUTO: 15 10E9/L (ref 1.6–8.3)
NEUTROPHILS # BLD AUTO: 2.6 10E9/L (ref 1.6–8.3)
NEUTROPHILS # BLD AUTO: 3 10E9/L (ref 1.6–8.3)
NEUTROPHILS # BLD AUTO: 4.3 10E9/L (ref 1.6–8.3)
NEUTROPHILS # BLD AUTO: 4.6 10E9/L (ref 1.6–8.3)
NEUTROPHILS # BLD AUTO: 5.3 10E9/L (ref 1.6–8.3)
NEUTROPHILS # BLD AUTO: 5.5 10E9/L (ref 1.6–8.3)
NEUTROPHILS # BLD AUTO: 6.2 10E9/L (ref 1.6–8.3)
NEUTROPHILS # BLD AUTO: 7.8 10E9/L (ref 1.6–8.3)
NEUTROPHILS NFR BLD AUTO: 58.1 %
NEUTROPHILS NFR BLD AUTO: 58.6 %
NEUTROPHILS NFR BLD AUTO: 71.2 %
NEUTROPHILS NFR BLD AUTO: 74.7 %
NEUTROPHILS NFR BLD AUTO: 76.2 %
NEUTROPHILS NFR BLD AUTO: 77.2 %
NEUTROPHILS NFR BLD AUTO: 84.1 %
NEUTROPHILS NFR BLD AUTO: 86.1 %
NEUTROPHILS NFR BLD AUTO: 88 %
NEUTROPHILS NFR BLD AUTO: 92.1 %
NITRATE UR QL: NEGATIVE
NITRATE UR QL: POSITIVE
NRBC # BLD AUTO: 0 10*3/UL
NRBC BLD AUTO-RTO: 0 /100
NT-PROBNP SERPL-MCNC: 1855 PG/ML (ref 0–900)
NT-PROBNP SERPL-MCNC: 2039 PG/ML (ref 0–900)
NT-PROBNP SERPL-MCNC: 3244 PG/ML (ref 0–900)
NT-PROBNP SERPL-MCNC: ABNORMAL PG/ML (ref 0–900)
O2/TOTAL GAS SETTING VFR VENT: 21 %
O2/TOTAL GAS SETTING VFR VENT: ABNORMAL %
OXYHGB MFR BLD: 92 % (ref 92–100)
OXYHGB MFR BLDV: 54 %
OXYHGB MFR BLDV: 60 %
OXYHGB MFR BLDV: 62 %
OXYHGB MFR BLDV: 63 %
PCO2 BLD: 26 MM HG (ref 35–45)
PCO2 BLD: 26 MM HG (ref 35–45)
PCO2 BLD: 34 MM HG (ref 35–45)
PCO2 BLDV: 39 MM HG (ref 40–50)
PCO2 BLDV: 40 MM HG (ref 40–50)
PCO2 BLDV: 44 MM HG (ref 40–50)
PCO2 BLDV: 54 MM HG (ref 40–50)
PCO2 BLDV: 80 MM HG (ref 40–50)
PCO2 BLDV: NORMAL MM HG (ref 40–50)
PETH BLD-MCNC: 449 NG/ML
PH BLD: 7.03 PH (ref 7.35–7.45)
PH BLD: 7.11 PH (ref 7.35–7.45)
PH BLD: 7.18 PH (ref 7.35–7.45)
PH BLDV: 7.06 PH (ref 7.32–7.43)
PH BLDV: 7.07 PH (ref 7.32–7.43)
PH BLDV: 7.08 PH (ref 7.32–7.43)
PH BLDV: 7.1 PH (ref 7.32–7.43)
PH BLDV: 7.47 PH (ref 7.32–7.43)
PH BLDV: NORMAL PH (ref 7.32–7.43)
PH UR STRIP: 5.5 PH (ref 5–7)
PH UR STRIP: 5.5 PH (ref 5–7)
PH UR STRIP: 6 PH (ref 5–7)
PHOSPHATE SERPL-MCNC: 1.6 MG/DL (ref 2.5–4.5)
PHOSPHATE SERPL-MCNC: 1.7 MG/DL (ref 2.5–4.5)
PHOSPHATE SERPL-MCNC: 1.7 MG/DL (ref 2.5–4.5)
PHOSPHATE SERPL-MCNC: 1.8 MG/DL (ref 2.5–4.5)
PHOSPHATE SERPL-MCNC: 2.3 MG/DL (ref 2.5–4.5)
PHOSPHATE SERPL-MCNC: 2.5 MG/DL (ref 2.5–4.5)
PHOSPHATE SERPL-MCNC: 2.6 MG/DL (ref 2.5–4.5)
PHOSPHATE SERPL-MCNC: 2.6 MG/DL (ref 2.5–4.5)
PHOSPHATE SERPL-MCNC: 2.7 MG/DL (ref 2.5–4.5)
PHOSPHATE SERPL-MCNC: 2.8 MG/DL (ref 2.5–4.5)
PHOSPHATE SERPL-MCNC: 3 MG/DL (ref 2.5–4.5)
PHOSPHATE SERPL-MCNC: 3.1 MG/DL (ref 2.5–4.5)
PHOSPHATE SERPL-MCNC: 3.3 MG/DL (ref 2.5–4.5)
PHOSPHATE SERPL-MCNC: 3.4 MG/DL (ref 2.5–4.5)
PHOSPHATE SERPL-MCNC: 5.4 MG/DL (ref 2.5–4.5)
PHOSPHATE SERPL-MCNC: NORMAL MG/DL (ref 2.5–4.5)
PLAT MORPH BLD: NORMAL
PLATELET # BLD AUTO: 159 10E9/L (ref 150–450)
PLATELET # BLD AUTO: 176 10E9/L (ref 150–450)
PLATELET # BLD AUTO: 179 THOU/UL (ref 140–440)
PLATELET # BLD AUTO: 179 THOU/UL (ref 140–440)
PLATELET # BLD AUTO: 198 10E9/L (ref 150–450)
PLATELET # BLD AUTO: 201 10E9/L (ref 150–450)
PLATELET # BLD AUTO: 211 THOU/UL (ref 140–440)
PLATELET # BLD AUTO: 211 THOU/UL (ref 140–440)
PLATELET # BLD AUTO: 223 10E9/L (ref 150–450)
PLATELET # BLD AUTO: 228 10E9/L (ref 150–450)
PLATELET # BLD AUTO: 242 10E9/L (ref 150–450)
PLATELET # BLD AUTO: 244 10E9/L (ref 150–450)
PLATELET # BLD AUTO: 264 10E9/L (ref 150–450)
PLATELET # BLD AUTO: 278 10E9/L (ref 150–450)
PLATELET # BLD AUTO: 283 10E9/L (ref 150–450)
PLATELET # BLD AUTO: 285 10E9/L (ref 150–450)
PLATELET # BLD AUTO: 291 10E9/L (ref 150–450)
PLATELET # BLD AUTO: 335 10E9/L (ref 150–450)
PLATELET # BLD AUTO: 367 10E9/L (ref 150–450)
PLATELET # BLD AUTO: 436 10E9/L (ref 150–450)
PLATELET # BLD AUTO: 454 10E9/L (ref 150–450)
PLATELET # BLD AUTO: 462 10E9/L (ref 150–450)
PLATELET # BLD AUTO: 520 10E9/L (ref 150–450)
PLATELET # BLD AUTO: NORMAL 10E9/L (ref 150–450)
PMV BLD AUTO: 11.6 FL (ref 8.5–12.5)
PMV BLD AUTO: 11.8 FL (ref 8.5–12.5)
PO2 BLD: 159 MM HG (ref 80–105)
PO2 BLD: 350 MM HG (ref 80–105)
PO2 BLD: 99 MM HG (ref 80–105)
PO2 BLDV: 22 MM HG (ref 25–47)
PO2 BLDV: 42 MM HG (ref 25–47)
PO2 BLDV: 45 MM HG (ref 25–47)
PO2 BLDV: 47 MM HG (ref 25–47)
PO2 BLDV: 49 MM HG (ref 25–47)
PO2 BLDV: NORMAL MM HG (ref 25–47)
POLYCHROMASIA BLD QL SMEAR: ABNORMAL
POTASSIUM BLD-SCNC: 2.5 MMOL/L (ref 3.4–5.3)
POTASSIUM BLD-SCNC: 3.5 MMOL/L (ref 3.5–5)
POTASSIUM BLD-SCNC: 4 MMOL/L (ref 3.5–5)
POTASSIUM SERPL-SCNC: 2.5 MMOL/L (ref 3.4–5.3)
POTASSIUM SERPL-SCNC: 2.5 MMOL/L (ref 3.4–5.3)
POTASSIUM SERPL-SCNC: 2.9 MMOL/L (ref 3.4–5.3)
POTASSIUM SERPL-SCNC: 3 MMOL/L (ref 3.4–5.3)
POTASSIUM SERPL-SCNC: 3.2 MMOL/L (ref 3.4–5.3)
POTASSIUM SERPL-SCNC: 3.2 MMOL/L (ref 3.4–5.3)
POTASSIUM SERPL-SCNC: 3.3 MMOL/L (ref 3.4–5.3)
POTASSIUM SERPL-SCNC: 3.4 MMOL/L (ref 3.4–5.3)
POTASSIUM SERPL-SCNC: 3.4 MMOL/L (ref 3.4–5.3)
POTASSIUM SERPL-SCNC: 3.5 MMOL/L (ref 3.4–5.3)
POTASSIUM SERPL-SCNC: 3.5 MMOL/L (ref 3.4–5.3)
POTASSIUM SERPL-SCNC: 3.5 MMOL/L (ref 3.5–5)
POTASSIUM SERPL-SCNC: 3.6 MMOL/L (ref 3.4–5.3)
POTASSIUM SERPL-SCNC: 3.7 MMOL/L (ref 3.4–5.3)
POTASSIUM SERPL-SCNC: 3.7 MMOL/L (ref 3.4–5.3)
POTASSIUM SERPL-SCNC: 4 MMOL/L (ref 3.4–5.3)
POTASSIUM SERPL-SCNC: 4 MMOL/L (ref 3.4–5.3)
POTASSIUM SERPL-SCNC: 4 MMOL/L (ref 3.5–5)
POTASSIUM SERPL-SCNC: 4.1 MMOL/L (ref 3.4–5.3)
POTASSIUM SERPL-SCNC: 4.3 MMOL/L (ref 3.4–5.3)
POTASSIUM SERPL-SCNC: 4.4 MMOL/L (ref 3.4–5.3)
POTASSIUM SERPL-SCNC: 4.6 MMOL/L (ref 3.4–5.3)
POTASSIUM SERPL-SCNC: 4.6 MMOL/L (ref 3.4–5.3)
POTASSIUM SERPL-SCNC: 5.4 MMOL/L (ref 3.4–5.3)
POTASSIUM SERPL-SCNC: 6 MMOL/L (ref 3.4–5.3)
POTASSIUM SERPL-SCNC: 8.1 MMOL/L (ref 3.4–5.3)
POTASSIUM SERPL-SCNC: NORMAL MMOL/L (ref 3.4–5.3)
POTASSIUM SERPL-SCNC: NORMAL MMOL/L (ref 3.4–5.3)
POTASSIUM STL-SCNC: NORMAL MMOL/L
POTASSIUM UR-SCNC: 51 MMOL/L
PREALB SERPL-MCNC: 5.1 MG/DL (ref 19–38)
PROCALCITONIN SERPL-MCNC: 0.1 NG/ML
PROCALCITONIN SERPL-MCNC: 0.18 NG/ML
PROCALCITONIN SERPL-MCNC: 0.27 NG/ML
PROT SERPL-MCNC: 4 G/DL (ref 6.8–8.8)
PROT SERPL-MCNC: 4.3 G/DL (ref 6–8)
PROT SERPL-MCNC: 4.5 G/DL (ref 6.8–8.8)
PROT SERPL-MCNC: 4.6 G/DL (ref 6.8–8.8)
PROT SERPL-MCNC: 4.7 G/DL (ref 6.8–8.8)
PROT SERPL-MCNC: 4.7 G/DL (ref 6.8–8.8)
PROT SERPL-MCNC: 4.8 G/DL (ref 6.8–8.8)
PROT SERPL-MCNC: 4.8 G/DL (ref 6.8–8.8)
PROT SERPL-MCNC: 4.9 G/DL (ref 6.8–8.8)
PROT SERPL-MCNC: 5.2 G/DL (ref 6.8–8.8)
PROT SERPL-MCNC: 5.5 G/DL (ref 6.8–8.8)
PROT SERPL-MCNC: 5.6 G/DL (ref 6.8–8.8)
PROT SERPL-MCNC: 5.6 G/DL (ref 6.8–8.8)
PROT SERPL-MCNC: 5.7 G/DL (ref 6.8–8.8)
PROT SERPL-MCNC: 5.8 G/DL (ref 6.8–8.8)
PROT SERPL-MCNC: 6.1 G/DL (ref 6.8–8.8)
PROT SERPL-MCNC: 6.6 G/DL (ref 6.8–8.8)
PROT SERPL-MCNC: 6.7 G/DL (ref 6.8–8.8)
PROT SERPL-MCNC: 7.1 G/DL (ref 6.8–8.8)
PROT SERPL-MCNC: 7.3 G/DL (ref 6.8–8.8)
PROT SERPL-MCNC: NORMAL G/DL (ref 6.8–8.8)
RBC # BLD AUTO: 1.81 10E12/L (ref 3.8–5.2)
RBC # BLD AUTO: 2.14 10E12/L (ref 3.8–5.2)
RBC # BLD AUTO: 2.14 10E12/L (ref 3.8–5.2)
RBC # BLD AUTO: 2.31 10E12/L (ref 3.8–5.2)
RBC # BLD AUTO: 2.32 MILL/UL (ref 3.8–5.4)
RBC # BLD AUTO: 2.32 MILL/UL (ref 3.8–5.4)
RBC # BLD AUTO: 2.34 MILL/UL (ref 3.8–5.4)
RBC # BLD AUTO: 2.34 MILL/UL (ref 3.8–5.4)
RBC # BLD AUTO: 2.36 10E12/L (ref 3.8–5.2)
RBC # BLD AUTO: 2.37 10E12/L (ref 3.8–5.2)
RBC # BLD AUTO: 2.38 10E12/L (ref 3.8–5.2)
RBC # BLD AUTO: 2.41 10E12/L (ref 3.8–5.2)
RBC # BLD AUTO: 2.49 10E12/L (ref 3.8–5.2)
RBC # BLD AUTO: 2.55 10E12/L (ref 3.8–5.2)
RBC # BLD AUTO: 2.62 10E12/L (ref 3.8–5.2)
RBC # BLD AUTO: 2.69 10E12/L (ref 3.8–5.2)
RBC # BLD AUTO: 2.73 10E12/L (ref 3.8–5.2)
RBC # BLD AUTO: 2.73 10E12/L (ref 3.8–5.2)
RBC # BLD AUTO: 2.81 10E12/L (ref 3.8–5.2)
RBC # BLD AUTO: 2.83 10E12/L (ref 3.8–5.2)
RBC # BLD AUTO: 2.85 10E12/L (ref 3.8–5.2)
RBC # BLD AUTO: 2.99 10E12/L (ref 3.8–5.2)
RBC # BLD AUTO: 3.08 10E12/L (ref 3.8–5.2)
RBC # BLD AUTO: NORMAL 10E12/L (ref 3.8–5.2)
RBC #/AREA URNS AUTO: 0 /HPF (ref 0–2)
RBC #/AREA URNS AUTO: 1 /HPF (ref 0–2)
RBC #/AREA URNS AUTO: 1 /HPF (ref 0–2)
RBC #/AREA URNS AUTO: 2 /HPF (ref 0–2)
RBC #/AREA URNS AUTO: <1 /HPF (ref 0–2)
RETICS # AUTO: 57.9 10E9/L (ref 25–95)
RETICS # AUTO: 67.6 10E9/L (ref 25–95)
RETICS/RBC NFR AUTO: 2.7 % (ref 0.5–2)
RETICS/RBC NFR AUTO: 3.2 % (ref 0.5–2)
RIBOTYPE 027/NAP1/BI: NORMAL
S PNEUM AG SPEC QL: NORMAL
SAO2 % BLDV FROM PO2: 41 %
SAO2 % BLDV FROM PO2: NORMAL %
SARS-COV-2 RNA SPEC QL NAA+PROBE: NEGATIVE
SARS-COV-2 RNA SPEC QL NAA+PROBE: NORMAL
SARS-COV-2 RNA SPEC QL NAA+PROBE: NOT DETECTED
SARS-COV-2 RNA SPEC QL NAA+PROBE: NOT DETECTED
SODIUM BLD-SCNC: 142 MMOL/L (ref 133–144)
SODIUM SERPL-SCNC: 130 MMOL/L (ref 133–144)
SODIUM SERPL-SCNC: 136 MMOL/L (ref 133–144)
SODIUM SERPL-SCNC: 136 MMOL/L (ref 136–145)
SODIUM SERPL-SCNC: 136 MMOL/L (ref 136–145)
SODIUM SERPL-SCNC: 137 MMOL/L (ref 133–144)
SODIUM SERPL-SCNC: 138 MMOL/L (ref 133–144)
SODIUM SERPL-SCNC: 139 MMOL/L (ref 133–144)
SODIUM SERPL-SCNC: 139 MMOL/L (ref 133–144)
SODIUM SERPL-SCNC: 140 MMOL/L (ref 133–144)
SODIUM SERPL-SCNC: 141 MMOL/L (ref 133–144)
SODIUM SERPL-SCNC: 141 MMOL/L (ref 136–145)
SODIUM SERPL-SCNC: 141 MMOL/L (ref 136–145)
SODIUM SERPL-SCNC: 142 MMOL/L (ref 133–144)
SODIUM SERPL-SCNC: 144 MMOL/L (ref 133–144)
SODIUM SERPL-SCNC: 145 MMOL/L (ref 133–144)
SODIUM SERPL-SCNC: 150 MMOL/L (ref 133–144)
SODIUM SERPL-SCNC: NORMAL MMOL/L (ref 133–144)
SODIUM SERPL-SCNC: NORMAL MMOL/L (ref 133–144)
SODIUM STL-SCNC: NORMAL MMOL/L
SOURCE: ABNORMAL
SP GR UR STRIP: 1.01 (ref 1–1.03)
SP GR UR STRIP: 1.02 (ref 1–1.03)
SP GR UR STRIP: 1.02 (ref 1–1.03)
SPECIMEN SOURCE: ABNORMAL
SPECIMEN SOURCE: ABNORMAL
SPECIMEN SOURCE: NORMAL
SQUAMOUS #/AREA URNS AUTO: 1 /HPF (ref 0–1)
SQUAMOUS #/AREA URNS AUTO: 1 /HPF (ref 0–1)
SQUAMOUS #/AREA URNS AUTO: 2 /HPF (ref 0–1)
SQUAMOUS #/AREA URNS AUTO: 3 /HPF (ref 0–1)
T4 FREE SERPL-MCNC: 0.78 NG/DL (ref 0.76–1.46)
TARGETS BLD QL SMEAR: ABNORMAL
TOTAL NEUTROPHILS-ABS(DIFF): 1.9 THOU/UL (ref 2–7.7)
TOTAL NEUTROPHILS-REL(DIFF): 56 % (ref 50–70)
TRANS CELLS #/AREA URNS HPF: <1 /HPF (ref 0–1)
TROPONIN I SERPL-MCNC: 0.06 UG/L (ref 0–0.04)
TROPONIN I SERPL-MCNC: 0.12 UG/L (ref 0–0.04)
TROPONIN I SERPL-MCNC: 0.13 UG/L (ref 0–0.04)
TROPONIN I SERPL-MCNC: 0.6 UG/L (ref 0–0.04)
TROPONIN I SERPL-MCNC: 0.66 UG/L (ref 0–0.04)
TROPONIN I SERPL-MCNC: 0.71 UG/L (ref 0–0.04)
TROPONIN I SERPL-MCNC: 0.73 UG/L (ref 0–0.04)
TROPONIN I SERPL-MCNC: <0.015 UG/L (ref 0–0.04)
TROPONIN I SERPL-MCNC: <0.015 UG/L (ref 0–0.04)
TROPONIN I SERPL-MCNC: NORMAL UG/L (ref 0–0.04)
TSH SERPL DL<=0.005 MIU/L-ACNC: 1.6 MU/L (ref 0.4–4)
TSH SERPL DL<=0.005 MIU/L-ACNC: 1.81 MU/L (ref 0.4–4)
TSH SERPL DL<=0.005 MIU/L-ACNC: 10.81 MU/L (ref 0.4–4)
TTG IGA SER-ACNC: 1 U/ML
TTG IGG SER-ACNC: 1 U/ML
UROBILINOGEN UR STRIP-MCNC: 2 MG/DL (ref 0–2)
UROBILINOGEN UR STRIP-MCNC: 4 MG/DL (ref 0–2)
UROBILINOGEN UR STRIP-MCNC: NORMAL MG/DL (ref 0–2)
VIT B1 BLD-MCNC: 74 NMOL/L (ref 70–180)
VIT B12 SERPL-MCNC: 1673 PG/ML (ref 193–986)
VIT B12 SERPL-MCNC: 1679 PG/ML (ref 193–986)
VIT B12 SERPL-MCNC: 2338 PG/ML (ref 193–986)
WBC # BLD AUTO: 15 10E9/L (ref 4–11)
WBC # BLD AUTO: 16.3 10E9/L (ref 4–11)
WBC # BLD AUTO: 17.7 10E9/L (ref 4–11)
WBC # BLD AUTO: 18.5 10E9/L (ref 4–11)
WBC # BLD AUTO: 3.4 THOU/UL (ref 4–11)
WBC # BLD AUTO: 3.8 10E9/L (ref 4–11)
WBC # BLD AUTO: 3.8 THOU/UL (ref 4–11)
WBC # BLD AUTO: 4 10E9/L (ref 4–11)
WBC # BLD AUTO: 4.5 10E9/L (ref 4–11)
WBC # BLD AUTO: 5 10E9/L (ref 4–11)
WBC # BLD AUTO: 5.1 10E9/L (ref 4–11)
WBC # BLD AUTO: 5.1 10E9/L (ref 4–11)
WBC # BLD AUTO: 5.4 10E9/L (ref 4–11)
WBC # BLD AUTO: 5.7 10E9/L (ref 4–11)
WBC # BLD AUTO: 6.1 10E9/L (ref 4–11)
WBC # BLD AUTO: 6.4 10E9/L (ref 4–11)
WBC # BLD AUTO: 7.1 10E9/L (ref 4–11)
WBC # BLD AUTO: 7.1 10E9/L (ref 4–11)
WBC # BLD AUTO: 7.3 10E9/L (ref 4–11)
WBC # BLD AUTO: 8.1 10E9/L (ref 4–11)
WBC # BLD AUTO: 9.3 10E9/L (ref 4–11)
WBC # BLD AUTO: NORMAL 10E9/L (ref 4–11)
WBC #/AREA URNS AUTO: 1 /HPF (ref 0–5)
WBC #/AREA URNS AUTO: 14 /HPF (ref 0–5)
WBC #/AREA URNS AUTO: 2 /HPF (ref 0–5)
WBC #/AREA URNS AUTO: 4 /HPF (ref 0–5)
WBC #/AREA URNS AUTO: 7 /HPF (ref 0–5)
WBC: 3.4 THOU/UL (ref 4–11)
WBC: 3.8 THOU/UL (ref 4–11)

## 2020-01-01 PROCEDURE — 97116 GAIT TRAINING THERAPY: CPT | Mod: GP

## 2020-01-01 PROCEDURE — 25000125 ZZHC RX 250

## 2020-01-01 PROCEDURE — 25800030 ZZH RX IP 258 OP 636

## 2020-01-01 PROCEDURE — 25000132 ZZH RX MED GY IP 250 OP 250 PS 637: Mod: GY | Performed by: INTERNAL MEDICINE

## 2020-01-01 PROCEDURE — 25000128 H RX IP 250 OP 636: Performed by: NURSE PRACTITIONER

## 2020-01-01 PROCEDURE — 84484 ASSAY OF TROPONIN QUANT: CPT

## 2020-01-01 PROCEDURE — 97140 MANUAL THERAPY 1/> REGIONS: CPT | Mod: GO

## 2020-01-01 PROCEDURE — 02HV33Z INSERTION OF INFUSION DEVICE INTO SUPERIOR VENA CAVA, PERCUTANEOUS APPROACH: ICD-10-PCS

## 2020-01-01 PROCEDURE — 97530 THERAPEUTIC ACTIVITIES: CPT | Mod: GP

## 2020-01-01 PROCEDURE — 25000132 ZZH RX MED GY IP 250 OP 250 PS 637: Mod: GY | Performed by: EMERGENCY MEDICINE

## 2020-01-01 PROCEDURE — 25000128 H RX IP 250 OP 636: Performed by: STUDENT IN AN ORGANIZED HEALTH CARE EDUCATION/TRAINING PROGRAM

## 2020-01-01 PROCEDURE — 00000146 ZZHCL STATISTIC GLUCOSE BY METER IP

## 2020-01-01 PROCEDURE — 25000132 ZZH RX MED GY IP 250 OP 250 PS 637: Mod: GY | Performed by: PHYSICIAN ASSISTANT

## 2020-01-01 PROCEDURE — 85027 COMPLETE CBC AUTOMATED: CPT | Performed by: PHYSICIAN ASSISTANT

## 2020-01-01 PROCEDURE — 99221 1ST HOSP IP/OBS SF/LOW 40: CPT | Mod: 25 | Performed by: INTERNAL MEDICINE

## 2020-01-01 PROCEDURE — 99233 SBSQ HOSP IP/OBS HIGH 50: CPT | Mod: GC | Performed by: INTERNAL MEDICINE

## 2020-01-01 PROCEDURE — 83735 ASSAY OF MAGNESIUM: CPT | Performed by: PHYSICIAN ASSISTANT

## 2020-01-01 PROCEDURE — 80053 COMPREHEN METABOLIC PANEL: CPT | Performed by: INTERNAL MEDICINE

## 2020-01-01 PROCEDURE — 36415 COLL VENOUS BLD VENIPUNCTURE: CPT | Performed by: ORTHOPAEDIC SURGERY

## 2020-01-01 PROCEDURE — 83605 ASSAY OF LACTIC ACID: CPT | Performed by: STUDENT IN AN ORGANIZED HEALTH CARE EDUCATION/TRAINING PROGRAM

## 2020-01-01 PROCEDURE — 36415 COLL VENOUS BLD VENIPUNCTURE: CPT | Performed by: INTERNAL MEDICINE

## 2020-01-01 PROCEDURE — 80053 COMPREHEN METABOLIC PANEL: CPT | Performed by: STUDENT IN AN ORGANIZED HEALTH CARE EDUCATION/TRAINING PROGRAM

## 2020-01-01 PROCEDURE — 97535 SELF CARE MNGMENT TRAINING: CPT | Mod: GO | Performed by: OCCUPATIONAL THERAPIST

## 2020-01-01 PROCEDURE — 36415 COLL VENOUS BLD VENIPUNCTURE: CPT | Performed by: STUDENT IN AN ORGANIZED HEALTH CARE EDUCATION/TRAINING PROGRAM

## 2020-01-01 PROCEDURE — 25800025 ZZH RX 258: Performed by: INTERNAL MEDICINE

## 2020-01-01 PROCEDURE — 83880 ASSAY OF NATRIURETIC PEPTIDE: CPT | Performed by: EMERGENCY MEDICINE

## 2020-01-01 PROCEDURE — 25800030 ZZH RX IP 258 OP 636: Performed by: STUDENT IN AN ORGANIZED HEALTH CARE EDUCATION/TRAINING PROGRAM

## 2020-01-01 PROCEDURE — 3E043XZ INTRODUCTION OF VASOPRESSOR INTO CENTRAL VEIN, PERCUTANEOUS APPROACH: ICD-10-PCS

## 2020-01-01 PROCEDURE — 25000132 ZZH RX MED GY IP 250 OP 250 PS 637: Mod: GY | Performed by: STUDENT IN AN ORGANIZED HEALTH CARE EDUCATION/TRAINING PROGRAM

## 2020-01-01 PROCEDURE — 25000128 H RX IP 250 OP 636: Performed by: EMERGENCY MEDICINE

## 2020-01-01 PROCEDURE — 25000132 ZZH RX MED GY IP 250 OP 250 PS 637: Mod: GY | Performed by: NURSE PRACTITIONER

## 2020-01-01 PROCEDURE — 25000128 H RX IP 250 OP 636: Performed by: PHYSICIAN ASSISTANT

## 2020-01-01 PROCEDURE — G0472 HEP C SCREEN HIGH RISK/OTHER: HCPCS | Performed by: STUDENT IN AN ORGANIZED HEALTH CARE EDUCATION/TRAINING PROGRAM

## 2020-01-01 PROCEDURE — 25800030 ZZH RX IP 258 OP 636: Performed by: PHYSICIAN ASSISTANT

## 2020-01-01 PROCEDURE — 83690 ASSAY OF LIPASE: CPT | Performed by: PHYSICIAN ASSISTANT

## 2020-01-01 PROCEDURE — 93321 DOPPLER ECHO F-UP/LMTD STD: CPT | Mod: 26 | Performed by: INTERNAL MEDICINE

## 2020-01-01 PROCEDURE — 84100 ASSAY OF PHOSPHORUS: CPT | Performed by: INTERNAL MEDICINE

## 2020-01-01 PROCEDURE — 84443 ASSAY THYROID STIM HORMONE: CPT | Performed by: EMERGENCY MEDICINE

## 2020-01-01 PROCEDURE — C9803 HOPD COVID-19 SPEC COLLECT: HCPCS | Performed by: EMERGENCY MEDICINE

## 2020-01-01 PROCEDURE — 12000001 ZZH R&B MED SURG/OB UMMC

## 2020-01-01 PROCEDURE — 25000125 ZZHC RX 250: Performed by: STUDENT IN AN ORGANIZED HEALTH CARE EDUCATION/TRAINING PROGRAM

## 2020-01-01 PROCEDURE — 84460 ALANINE AMINO (ALT) (SGPT): CPT

## 2020-01-01 PROCEDURE — 82550 ASSAY OF CK (CPK): CPT | Performed by: EMERGENCY MEDICINE

## 2020-01-01 PROCEDURE — 82390 ASSAY OF CERULOPLASMIN: CPT | Performed by: STUDENT IN AN ORGANIZED HEALTH CARE EDUCATION/TRAINING PROGRAM

## 2020-01-01 PROCEDURE — 87040 BLOOD CULTURE FOR BACTERIA: CPT

## 2020-01-01 PROCEDURE — 36415 COLL VENOUS BLD VENIPUNCTURE: CPT | Performed by: PHYSICIAN ASSISTANT

## 2020-01-01 PROCEDURE — 82533 TOTAL CORTISOL: CPT | Performed by: STUDENT IN AN ORGANIZED HEALTH CARE EDUCATION/TRAINING PROGRAM

## 2020-01-01 PROCEDURE — 83735 ASSAY OF MAGNESIUM: CPT | Performed by: EMERGENCY MEDICINE

## 2020-01-01 PROCEDURE — 84155 ASSAY OF PROTEIN SERUM: CPT

## 2020-01-01 PROCEDURE — 73610 X-RAY EXAM OF ANKLE: CPT | Mod: LT

## 2020-01-01 PROCEDURE — 87086 URINE CULTURE/COLONY COUNT: CPT | Performed by: STUDENT IN AN ORGANIZED HEALTH CARE EDUCATION/TRAINING PROGRAM

## 2020-01-01 PROCEDURE — 80053 COMPREHEN METABOLIC PANEL: CPT | Performed by: PHYSICIAN ASSISTANT

## 2020-01-01 PROCEDURE — 93325 DOPPLER ECHO COLOR FLOW MAPG: CPT

## 2020-01-01 PROCEDURE — 93005 ELECTROCARDIOGRAM TRACING: CPT

## 2020-01-01 PROCEDURE — 81001 URINALYSIS AUTO W/SCOPE: CPT | Performed by: EMERGENCY MEDICINE

## 2020-01-01 PROCEDURE — 25000132 ZZH RX MED GY IP 250 OP 250 PS 637: Mod: GY

## 2020-01-01 PROCEDURE — 25000132 ZZH RX MED GY IP 250 OP 250 PS 637: Mod: GY | Performed by: ORTHOPAEDIC SURGERY

## 2020-01-01 PROCEDURE — 84100 ASSAY OF PHOSPHORUS: CPT | Performed by: STUDENT IN AN ORGANIZED HEALTH CARE EDUCATION/TRAINING PROGRAM

## 2020-01-01 PROCEDURE — 40000502 ZZHCL STATISTIC GLUCOSE ED POCT

## 2020-01-01 PROCEDURE — 82607 VITAMIN B-12: CPT | Performed by: STUDENT IN AN ORGANIZED HEALTH CARE EDUCATION/TRAINING PROGRAM

## 2020-01-01 PROCEDURE — 84145 PROCALCITONIN (PCT): CPT

## 2020-01-01 PROCEDURE — 87070 CULTURE OTHR SPECIMN AEROBIC: CPT | Performed by: PHYSICIAN ASSISTANT

## 2020-01-01 PROCEDURE — 84100 ASSAY OF PHOSPHORUS: CPT | Performed by: EMERGENCY MEDICINE

## 2020-01-01 PROCEDURE — 85045 AUTOMATED RETICULOCYTE COUNT: CPT | Performed by: PHYSICIAN ASSISTANT

## 2020-01-01 PROCEDURE — 99291 CRITICAL CARE FIRST HOUR: CPT | Mod: GC | Performed by: INTERNAL MEDICINE

## 2020-01-01 PROCEDURE — 97530 THERAPEUTIC ACTIVITIES: CPT | Mod: GO | Performed by: OCCUPATIONAL THERAPIST

## 2020-01-01 PROCEDURE — 93010 ELECTROCARDIOGRAM REPORT: CPT | Mod: 76 | Performed by: INTERNAL MEDICINE

## 2020-01-01 PROCEDURE — 83605 ASSAY OF LACTIC ACID: CPT | Performed by: ORTHOPAEDIC SURGERY

## 2020-01-01 PROCEDURE — 85025 COMPLETE CBC W/AUTO DIFF WBC: CPT | Performed by: STUDENT IN AN ORGANIZED HEALTH CARE EDUCATION/TRAINING PROGRAM

## 2020-01-01 PROCEDURE — 20000004 ZZH R&B ICU UMMC

## 2020-01-01 PROCEDURE — U0003 INFECTIOUS AGENT DETECTION BY NUCLEIC ACID (DNA OR RNA); SEVERE ACUTE RESPIRATORY SYNDROME CORONAVIRUS 2 (SARS-COV-2) (CORONAVIRUS DISEASE [COVID-19]), AMPLIFIED PROBE TECHNIQUE, MAKING USE OF HIGH THROUGHPUT TECHNOLOGIES AS DESCRIBED BY CMS-2020-01-R: HCPCS | Performed by: EMERGENCY MEDICINE

## 2020-01-01 PROCEDURE — 25500064 ZZH RX 255 OP 636: Performed by: INTERNAL MEDICINE

## 2020-01-01 PROCEDURE — 87899 AGENT NOS ASSAY W/OPTIC: CPT | Performed by: PHYSICIAN ASSISTANT

## 2020-01-01 PROCEDURE — 71275 CT ANGIOGRAPHY CHEST: CPT

## 2020-01-01 PROCEDURE — 82803 BLOOD GASES ANY COMBINATION: CPT

## 2020-01-01 PROCEDURE — 25000125 ZZHC RX 250: Performed by: PHYSICIAN ASSISTANT

## 2020-01-01 PROCEDURE — 85025 COMPLETE CBC W/AUTO DIFF WBC: CPT

## 2020-01-01 PROCEDURE — 82565 ASSAY OF CREATININE: CPT | Performed by: PHYSICIAN ASSISTANT

## 2020-01-01 PROCEDURE — 68200002 ZZH TRAUMA EVALUATION W/O CC LEVEL II: Performed by: EMERGENCY MEDICINE

## 2020-01-01 PROCEDURE — 99285 EMERGENCY DEPT VISIT HI MDM: CPT | Mod: 25 | Performed by: EMERGENCY MEDICINE

## 2020-01-01 PROCEDURE — 82805 BLOOD GASES W/O2 SATURATION: CPT

## 2020-01-01 PROCEDURE — 36415 COLL VENOUS BLD VENIPUNCTURE: CPT

## 2020-01-01 PROCEDURE — 97530 THERAPEUTIC ACTIVITIES: CPT | Mod: GO

## 2020-01-01 PROCEDURE — 93005 ELECTROCARDIOGRAM TRACING: CPT | Performed by: EMERGENCY MEDICINE

## 2020-01-01 PROCEDURE — 80048 BASIC METABOLIC PNL TOTAL CA: CPT

## 2020-01-01 PROCEDURE — 93306 TTE W/DOPPLER COMPLETE: CPT

## 2020-01-01 PROCEDURE — 84425 ASSAY OF VITAMIN B-1: CPT | Performed by: PHYSICIAN ASSISTANT

## 2020-01-01 PROCEDURE — 40000556 ZZH STATISTIC PERIPHERAL IV START W US GUIDANCE

## 2020-01-01 PROCEDURE — 80321 ALCOHOLS BIOMARKERS 1OR 2: CPT | Performed by: STUDENT IN AN ORGANIZED HEALTH CARE EDUCATION/TRAINING PROGRAM

## 2020-01-01 PROCEDURE — 27211416 ZZ H KIT, CATH IV, 20 GA X 6CM, ENDURANCE EXT DWELL

## 2020-01-01 PROCEDURE — 99233 SBSQ HOSP IP/OBS HIGH 50: CPT | Performed by: ORTHOPAEDIC SURGERY

## 2020-01-01 PROCEDURE — 82330 ASSAY OF CALCIUM: CPT

## 2020-01-01 PROCEDURE — 84484 ASSAY OF TROPONIN QUANT: CPT | Performed by: PHYSICIAN ASSISTANT

## 2020-01-01 PROCEDURE — 87205 SMEAR GRAM STAIN: CPT | Performed by: PHYSICIAN ASSISTANT

## 2020-01-01 PROCEDURE — 82746 ASSAY OF FOLIC ACID SERUM: CPT | Performed by: STUDENT IN AN ORGANIZED HEALTH CARE EDUCATION/TRAINING PROGRAM

## 2020-01-01 PROCEDURE — 80053 COMPREHEN METABOLIC PANEL: CPT

## 2020-01-01 PROCEDURE — 83690 ASSAY OF LIPASE: CPT | Performed by: EMERGENCY MEDICINE

## 2020-01-01 PROCEDURE — 85610 PROTHROMBIN TIME: CPT | Performed by: PHYSICIAN ASSISTANT

## 2020-01-01 PROCEDURE — 99233 SBSQ HOSP IP/OBS HIGH 50: CPT | Performed by: HOSPITALIST

## 2020-01-01 PROCEDURE — 82247 BILIRUBIN TOTAL: CPT

## 2020-01-01 PROCEDURE — 84132 ASSAY OF SERUM POTASSIUM: CPT | Performed by: ORTHOPAEDIC SURGERY

## 2020-01-01 PROCEDURE — 80053 COMPREHEN METABOLIC PANEL: CPT | Performed by: EMERGENCY MEDICINE

## 2020-01-01 PROCEDURE — 92610 EVALUATE SWALLOWING FUNCTION: CPT | Mod: GN

## 2020-01-01 PROCEDURE — 93971 EXTREMITY STUDY: CPT | Mod: RT

## 2020-01-01 PROCEDURE — 85027 COMPLETE CBC AUTOMATED: CPT | Performed by: STUDENT IN AN ORGANIZED HEALTH CARE EDUCATION/TRAINING PROGRAM

## 2020-01-01 PROCEDURE — 84145 PROCALCITONIN (PCT): CPT | Performed by: EMERGENCY MEDICINE

## 2020-01-01 PROCEDURE — 85025 COMPLETE CBC W/AUTO DIFF WBC: CPT | Performed by: ORTHOPAEDIC SURGERY

## 2020-01-01 PROCEDURE — 25800030 ZZH RX IP 258 OP 636: Performed by: NURSE PRACTITIONER

## 2020-01-01 PROCEDURE — 99233 SBSQ HOSP IP/OBS HIGH 50: CPT | Performed by: PHYSICIAN ASSISTANT

## 2020-01-01 PROCEDURE — 96360 HYDRATION IV INFUSION INIT: CPT | Performed by: EMERGENCY MEDICINE

## 2020-01-01 PROCEDURE — 96372 THER/PROPH/DIAG INJ SC/IM: CPT | Performed by: EMERGENCY MEDICINE

## 2020-01-01 PROCEDURE — P9047 ALBUMIN (HUMAN), 25%, 50ML: HCPCS | Performed by: NURSE PRACTITIONER

## 2020-01-01 PROCEDURE — 71045 X-RAY EXAM CHEST 1 VIEW: CPT

## 2020-01-01 PROCEDURE — 84145 PROCALCITONIN (PCT): CPT | Performed by: PHYSICIAN ASSISTANT

## 2020-01-01 PROCEDURE — 82438 ASSAY OTHER FLUID CHLORIDES: CPT | Performed by: PHYSICIAN ASSISTANT

## 2020-01-01 PROCEDURE — 83735 ASSAY OF MAGNESIUM: CPT

## 2020-01-01 PROCEDURE — 84484 ASSAY OF TROPONIN QUANT: CPT | Performed by: EMERGENCY MEDICINE

## 2020-01-01 PROCEDURE — 96365 THER/PROPH/DIAG IV INF INIT: CPT | Performed by: EMERGENCY MEDICINE

## 2020-01-01 PROCEDURE — 82306 VITAMIN D 25 HYDROXY: CPT | Performed by: EMERGENCY MEDICINE

## 2020-01-01 PROCEDURE — 99291 CRITICAL CARE FIRST HOUR: CPT | Mod: 25

## 2020-01-01 PROCEDURE — 85610 PROTHROMBIN TIME: CPT

## 2020-01-01 PROCEDURE — 25000128 H RX IP 250 OP 636: Performed by: ORTHOPAEDIC SURGERY

## 2020-01-01 PROCEDURE — 93306 TTE W/DOPPLER COMPLETE: CPT | Mod: 26 | Performed by: INTERNAL MEDICINE

## 2020-01-01 PROCEDURE — 84100 ASSAY OF PHOSPHORUS: CPT | Performed by: ORTHOPAEDIC SURGERY

## 2020-01-01 PROCEDURE — 40000014 ZZH STATISTIC ARTERIAL MONITORING DAILY

## 2020-01-01 PROCEDURE — 85025 COMPLETE CBC W/AUTO DIFF WBC: CPT | Performed by: PHYSICIAN ASSISTANT

## 2020-01-01 PROCEDURE — 97165 OT EVAL LOW COMPLEX 30 MIN: CPT | Mod: GO

## 2020-01-01 PROCEDURE — 85025 COMPLETE CBC W/AUTO DIFF WBC: CPT | Performed by: EMERGENCY MEDICINE

## 2020-01-01 PROCEDURE — 82607 VITAMIN B-12: CPT | Performed by: EMERGENCY MEDICINE

## 2020-01-01 PROCEDURE — 97110 THERAPEUTIC EXERCISES: CPT | Mod: GP

## 2020-01-01 PROCEDURE — 74240 X-RAY XM UPR GI TRC 1CNTRST: CPT

## 2020-01-01 PROCEDURE — 96368 THER/DIAG CONCURRENT INF: CPT | Performed by: EMERGENCY MEDICINE

## 2020-01-01 PROCEDURE — 25800025 ZZH RX 258: Performed by: PHYSICIAN ASSISTANT

## 2020-01-01 PROCEDURE — 93308 TTE F-UP OR LMTD: CPT | Mod: 26 | Performed by: INTERNAL MEDICINE

## 2020-01-01 PROCEDURE — 99285 EMERGENCY DEPT VISIT HI MDM: CPT | Mod: Z6 | Performed by: EMERGENCY MEDICINE

## 2020-01-01 PROCEDURE — 40000196 ZZH STATISTIC RAPCV CVP MONITORING

## 2020-01-01 PROCEDURE — 85025 COMPLETE CBC W/AUTO DIFF WBC: CPT | Performed by: INTERNAL MEDICINE

## 2020-01-01 PROCEDURE — 12000004 ZZH R&B IMCU UMMC

## 2020-01-01 PROCEDURE — 83735 ASSAY OF MAGNESIUM: CPT | Performed by: STUDENT IN AN ORGANIZED HEALTH CARE EDUCATION/TRAINING PROGRAM

## 2020-01-01 PROCEDURE — 84100 ASSAY OF PHOSPHORUS: CPT | Performed by: PHYSICIAN ASSISTANT

## 2020-01-01 PROCEDURE — 97161 PT EVAL LOW COMPLEX 20 MIN: CPT | Mod: GP

## 2020-01-01 PROCEDURE — 84443 ASSAY THYROID STIM HORMONE: CPT | Performed by: PHYSICIAN ASSISTANT

## 2020-01-01 PROCEDURE — 40000611 ZZHCL STATISTIC MORPHOLOGY W/INTERP HEMEPATH TC 85060: Performed by: STUDENT IN AN ORGANIZED HEALTH CARE EDUCATION/TRAINING PROGRAM

## 2020-01-01 PROCEDURE — 83880 ASSAY OF NATRIURETIC PEPTIDE: CPT

## 2020-01-01 PROCEDURE — 84075 ASSAY ALKALINE PHOSPHATASE: CPT

## 2020-01-01 PROCEDURE — 40000501 ZZHCL STATISTIC HEMATOCRIT ED POCT

## 2020-01-01 PROCEDURE — 85610 PROTHROMBIN TIME: CPT | Performed by: EMERGENCY MEDICINE

## 2020-01-01 PROCEDURE — 85027 COMPLETE CBC AUTOMATED: CPT | Performed by: NURSE PRACTITIONER

## 2020-01-01 PROCEDURE — 85379 FIBRIN DEGRADATION QUANT: CPT | Performed by: PHYSICIAN ASSISTANT

## 2020-01-01 PROCEDURE — 84132 ASSAY OF SERUM POTASSIUM: CPT | Performed by: INTERNAL MEDICINE

## 2020-01-01 PROCEDURE — 80329 ANALGESICS NON-OPIOID 1 OR 2: CPT | Performed by: EMERGENCY MEDICINE

## 2020-01-01 PROCEDURE — 99223 1ST HOSP IP/OBS HIGH 75: CPT | Mod: AI | Performed by: INTERNAL MEDICINE

## 2020-01-01 PROCEDURE — 76700 US EXAM ABDOM COMPLETE: CPT

## 2020-01-01 PROCEDURE — 86140 C-REACTIVE PROTEIN: CPT | Performed by: PHYSICIAN ASSISTANT

## 2020-01-01 PROCEDURE — 97750 PHYSICAL PERFORMANCE TEST: CPT | Mod: GP

## 2020-01-01 PROCEDURE — 93010 ELECTROCARDIOGRAM REPORT: CPT | Performed by: INTERNAL MEDICINE

## 2020-01-01 PROCEDURE — 83605 ASSAY OF LACTIC ACID: CPT | Performed by: EMERGENCY MEDICINE

## 2020-01-01 PROCEDURE — 87040 BLOOD CULTURE FOR BACTERIA: CPT | Performed by: INTERNAL MEDICINE

## 2020-01-01 PROCEDURE — 85610 PROTHROMBIN TIME: CPT | Performed by: STUDENT IN AN ORGANIZED HEALTH CARE EDUCATION/TRAINING PROGRAM

## 2020-01-01 PROCEDURE — 97535 SELF CARE MNGMENT TRAINING: CPT | Mod: GO

## 2020-01-01 PROCEDURE — 70450 CT HEAD/BRAIN W/O DYE: CPT

## 2020-01-01 PROCEDURE — 97110 THERAPEUTIC EXERCISES: CPT | Mod: GO | Performed by: OCCUPATIONAL THERAPIST

## 2020-01-01 PROCEDURE — 74018 RADEX ABDOMEN 1 VIEW: CPT

## 2020-01-01 PROCEDURE — 36415 COLL VENOUS BLD VENIPUNCTURE: CPT | Performed by: NURSE PRACTITIONER

## 2020-01-01 PROCEDURE — 85045 AUTOMATED RETICULOCYTE COUNT: CPT | Performed by: STUDENT IN AN ORGANIZED HEALTH CARE EDUCATION/TRAINING PROGRAM

## 2020-01-01 PROCEDURE — 36592 COLLECT BLOOD FROM PICC: CPT

## 2020-01-01 PROCEDURE — 80320 DRUG SCREEN QUANTALCOHOLS: CPT | Performed by: EMERGENCY MEDICINE

## 2020-01-01 PROCEDURE — 87086 URINE CULTURE/COLONY COUNT: CPT | Performed by: INTERNAL MEDICINE

## 2020-01-01 PROCEDURE — 36620 INSERTION CATHETER ARTERY: CPT

## 2020-01-01 PROCEDURE — 80048 BASIC METABOLIC PNL TOTAL CA: CPT | Performed by: PHYSICIAN ASSISTANT

## 2020-01-01 PROCEDURE — 97165 OT EVAL LOW COMPLEX 30 MIN: CPT | Mod: GO | Performed by: OCCUPATIONAL THERAPIST

## 2020-01-01 PROCEDURE — 83605 ASSAY OF LACTIC ACID: CPT | Performed by: INTERNAL MEDICINE

## 2020-01-01 PROCEDURE — 82040 ASSAY OF SERUM ALBUMIN: CPT

## 2020-01-01 PROCEDURE — 99239 HOSP IP/OBS DSCHRG MGMT >30: CPT | Performed by: STUDENT IN AN ORGANIZED HEALTH CARE EDUCATION/TRAINING PROGRAM

## 2020-01-01 PROCEDURE — 40000986 XR CHEST PORT 1 VW

## 2020-01-01 PROCEDURE — 83605 ASSAY OF LACTIC ACID: CPT

## 2020-01-01 PROCEDURE — 40000497 ZZHCL STATISTIC SODIUM ED POCT

## 2020-01-01 PROCEDURE — 85027 COMPLETE CBC AUTOMATED: CPT

## 2020-01-01 PROCEDURE — 83735 ASSAY OF MAGNESIUM: CPT | Performed by: ORTHOPAEDIC SURGERY

## 2020-01-01 PROCEDURE — 85652 RBC SED RATE AUTOMATED: CPT | Performed by: EMERGENCY MEDICINE

## 2020-01-01 PROCEDURE — 82330 ASSAY OF CALCIUM: CPT | Performed by: PHYSICIAN ASSISTANT

## 2020-01-01 PROCEDURE — 96366 THER/PROPH/DIAG IV INF ADDON: CPT | Performed by: EMERGENCY MEDICINE

## 2020-01-01 PROCEDURE — 84080 ASSAY ALKALINE PHOSPHATASES: CPT | Performed by: STUDENT IN AN ORGANIZED HEALTH CARE EDUCATION/TRAINING PROGRAM

## 2020-01-01 PROCEDURE — 97530 THERAPEUTIC ACTIVITIES: CPT | Mod: GP | Performed by: PHYSICAL THERAPIST

## 2020-01-01 PROCEDURE — 83735 ASSAY OF MAGNESIUM: CPT | Performed by: INTERNAL MEDICINE

## 2020-01-01 PROCEDURE — 81001 URINALYSIS AUTO W/SCOPE: CPT

## 2020-01-01 PROCEDURE — 83605 ASSAY OF LACTIC ACID: CPT | Performed by: NURSE PRACTITIONER

## 2020-01-01 PROCEDURE — 97116 GAIT TRAINING THERAPY: CPT | Mod: GP | Performed by: PHYSICAL THERAPIST

## 2020-01-01 PROCEDURE — 93010 ELECTROCARDIOGRAM REPORT: CPT | Mod: Z6 | Performed by: EMERGENCY MEDICINE

## 2020-01-01 PROCEDURE — 84133 ASSAY OF URINE POTASSIUM: CPT | Performed by: PHYSICIAN ASSISTANT

## 2020-01-01 PROCEDURE — 25800030 ZZH RX IP 258 OP 636: Performed by: HOSPITALIST

## 2020-01-01 PROCEDURE — 36592 COLLECT BLOOD FROM PICC: CPT | Performed by: PHYSICIAN ASSISTANT

## 2020-01-01 PROCEDURE — 99207 ZZC APP CREDIT; MD BILLING SHARED VISIT: CPT | Performed by: PHYSICIAN ASSISTANT

## 2020-01-01 PROCEDURE — 36592 COLLECT BLOOD FROM PICC: CPT | Performed by: STUDENT IN AN ORGANIZED HEALTH CARE EDUCATION/TRAINING PROGRAM

## 2020-01-01 PROCEDURE — 99223 1ST HOSP IP/OBS HIGH 75: CPT | Mod: AI | Performed by: ORTHOPAEDIC SURGERY

## 2020-01-01 PROCEDURE — 99223 1ST HOSP IP/OBS HIGH 75: CPT | Performed by: INTERNAL MEDICINE

## 2020-01-01 PROCEDURE — 40001072 ZZH STATISTICAL VASC ACCESS NURSE TIME, 16-31 MINUTES

## 2020-01-01 PROCEDURE — 84484 ASSAY OF TROPONIN QUANT: CPT | Performed by: NURSE PRACTITIONER

## 2020-01-01 PROCEDURE — 84132 ASSAY OF SERUM POTASSIUM: CPT | Performed by: STUDENT IN AN ORGANIZED HEALTH CARE EDUCATION/TRAINING PROGRAM

## 2020-01-01 PROCEDURE — 72125 CT NECK SPINE W/O DYE: CPT

## 2020-01-01 PROCEDURE — 25000128 H RX IP 250 OP 636: Performed by: HOSPITALIST

## 2020-01-01 PROCEDURE — 85730 THROMBOPLASTIN TIME PARTIAL: CPT

## 2020-01-01 PROCEDURE — 93325 DOPPLER ECHO COLOR FLOW MAPG: CPT | Mod: 26 | Performed by: INTERNAL MEDICINE

## 2020-01-01 PROCEDURE — 87040 BLOOD CULTURE FOR BACTERIA: CPT | Performed by: STUDENT IN AN ORGANIZED HEALTH CARE EDUCATION/TRAINING PROGRAM

## 2020-01-01 PROCEDURE — 99207 ZZC CDG-CODE CATEGORY CHANGED: CPT | Performed by: HOSPITALIST

## 2020-01-01 PROCEDURE — G0499 HEPB SCREEN HIGH RISK INDIV: HCPCS | Performed by: STUDENT IN AN ORGANIZED HEALTH CARE EDUCATION/TRAINING PROGRAM

## 2020-01-01 PROCEDURE — 85384 FIBRINOGEN ACTIVITY: CPT

## 2020-01-01 PROCEDURE — 82705 FATS/LIPIDS FECES QUAL: CPT | Performed by: PHYSICIAN ASSISTANT

## 2020-01-01 PROCEDURE — 74183 MRI ABD W/O CNTR FLWD CNTR: CPT

## 2020-01-01 PROCEDURE — 36556 INSERT NON-TUNNEL CV CATH: CPT

## 2020-01-01 PROCEDURE — 83516 IMMUNOASSAY NONANTIBODY: CPT | Performed by: PHYSICIAN ASSISTANT

## 2020-01-01 PROCEDURE — 82533 TOTAL CORTISOL: CPT | Performed by: PHYSICIAN ASSISTANT

## 2020-01-01 PROCEDURE — 86140 C-REACTIVE PROTEIN: CPT | Performed by: EMERGENCY MEDICINE

## 2020-01-01 PROCEDURE — 74177 CT ABD & PELVIS W/CONTRAST: CPT

## 2020-01-01 PROCEDURE — 82330 ASSAY OF CALCIUM: CPT | Performed by: STUDENT IN AN ORGANIZED HEALTH CARE EDUCATION/TRAINING PROGRAM

## 2020-01-01 PROCEDURE — 40000498 ZZHCL STATISTIC POTASSIUM ED POCT

## 2020-01-01 PROCEDURE — 71046 X-RAY EXAM CHEST 2 VIEWS: CPT

## 2020-01-01 PROCEDURE — 97140 MANUAL THERAPY 1/> REGIONS: CPT | Mod: GO | Performed by: OCCUPATIONAL THERAPIST

## 2020-01-01 PROCEDURE — 83605 ASSAY OF LACTIC ACID: CPT | Performed by: PHYSICIAN ASSISTANT

## 2020-01-01 PROCEDURE — 25000128 H RX IP 250 OP 636: Performed by: INTERNAL MEDICINE

## 2020-01-01 PROCEDURE — 74220 X-RAY XM ESOPHAGUS 1CNTRST: CPT

## 2020-01-01 PROCEDURE — 99232 SBSQ HOSP IP/OBS MODERATE 35: CPT | Performed by: STUDENT IN AN ORGANIZED HEALTH CARE EDUCATION/TRAINING PROGRAM

## 2020-01-01 PROCEDURE — 93321 DOPPLER ECHO F-UP/LMTD STD: CPT

## 2020-01-01 PROCEDURE — 92526 ORAL FUNCTION THERAPY: CPT | Mod: GN

## 2020-01-01 PROCEDURE — 25000128 H RX IP 250 OP 636

## 2020-01-01 PROCEDURE — 82330 ASSAY OF CALCIUM: CPT | Performed by: EMERGENCY MEDICINE

## 2020-01-01 PROCEDURE — 80053 COMPREHEN METABOLIC PANEL: CPT | Performed by: ORTHOPAEDIC SURGERY

## 2020-01-01 PROCEDURE — 80076 HEPATIC FUNCTION PANEL: CPT | Performed by: PHYSICIAN ASSISTANT

## 2020-01-01 PROCEDURE — 84302 ASSAY OF SWEAT SODIUM: CPT | Performed by: PHYSICIAN ASSISTANT

## 2020-01-01 PROCEDURE — 81003 URINALYSIS AUTO W/O SCOPE: CPT | Performed by: EMERGENCY MEDICINE

## 2020-01-01 PROCEDURE — 76705 ECHO EXAM OF ABDOMEN: CPT

## 2020-01-01 PROCEDURE — 97162 PT EVAL MOD COMPLEX 30 MIN: CPT | Mod: GP

## 2020-01-01 PROCEDURE — 84439 ASSAY OF FREE THYROXINE: CPT | Performed by: PHYSICIAN ASSISTANT

## 2020-01-01 PROCEDURE — 99207 ZZC APP CREDIT; MD BILLING SHARED VISIT: CPT | Performed by: INTERNAL MEDICINE

## 2020-01-01 PROCEDURE — 90662 IIV NO PRSV INCREASED AG IM: CPT | Performed by: INTERNAL MEDICINE

## 2020-01-01 PROCEDURE — 25800030 ZZH RX IP 258 OP 636: Performed by: EMERGENCY MEDICINE

## 2020-01-01 PROCEDURE — 82533 TOTAL CORTISOL: CPT | Performed by: INTERNAL MEDICINE

## 2020-01-01 PROCEDURE — 36569 INSJ PICC 5 YR+ W/O IMAGING: CPT

## 2020-01-01 PROCEDURE — 86706 HEP B SURFACE ANTIBODY: CPT | Performed by: STUDENT IN AN ORGANIZED HEALTH CARE EDUCATION/TRAINING PROGRAM

## 2020-01-01 PROCEDURE — A9585 GADOBUTROL INJECTION: HCPCS | Performed by: INTERNAL MEDICINE

## 2020-01-01 PROCEDURE — 84133 ASSAY OF URINE POTASSIUM: CPT | Performed by: STUDENT IN AN ORGANIZED HEALTH CARE EDUCATION/TRAINING PROGRAM

## 2020-01-01 RX ORDER — POTASSIUM CHLORIDE 750 MG/1
20 TABLET, EXTENDED RELEASE ORAL DAILY
Status: DISCONTINUED | OUTPATIENT
Start: 2020-01-01 | End: 2020-01-01 | Stop reason: HOSPADM

## 2020-01-01 RX ORDER — CARBOXYMETHYLCELLULOSE SODIUM 5 MG/ML
1 SOLUTION/ DROPS OPHTHALMIC 4 TIMES DAILY PRN
Status: DISCONTINUED | OUTPATIENT
Start: 2020-01-01 | End: 2020-01-01 | Stop reason: HOSPADM

## 2020-01-01 RX ORDER — METHOCARBAMOL 500 MG/1
500 TABLET, FILM COATED ORAL
Status: DISCONTINUED | OUTPATIENT
Start: 2020-01-01 | End: 2020-01-01 | Stop reason: HOSPADM

## 2020-01-01 RX ORDER — LANOLIN ALCOHOL/MO/W.PET/CERES
3 CREAM (GRAM) TOPICAL ONCE
Status: COMPLETED | OUTPATIENT
Start: 2020-01-01 | End: 2020-01-01

## 2020-01-01 RX ORDER — ACETAMINOPHEN 500 MG
1000 TABLET ORAL ONCE
Status: COMPLETED | OUTPATIENT
Start: 2020-01-01 | End: 2020-01-01

## 2020-01-01 RX ORDER — MIRTAZAPINE 15 MG/1
15 TABLET, FILM COATED ORAL AT BEDTIME
Qty: 60 TABLET | Refills: 0 | Status: SHIPPED | OUTPATIENT
Start: 2020-01-01 | End: 2020-01-01

## 2020-01-01 RX ORDER — KETOROLAC TROMETHAMINE 30 MG/ML
15 INJECTION, SOLUTION INTRAMUSCULAR; INTRAVENOUS
Status: COMPLETED | OUTPATIENT
Start: 2020-01-01 | End: 2020-01-01

## 2020-01-01 RX ORDER — MAGNESIUM SULFATE HEPTAHYDRATE 40 MG/ML
2 INJECTION, SOLUTION INTRAVENOUS ONCE
Status: COMPLETED | OUTPATIENT
Start: 2020-01-01 | End: 2020-01-01

## 2020-01-01 RX ORDER — OXYCODONE HYDROCHLORIDE 5 MG/1
5 TABLET ORAL EVERY 4 HOURS PRN
Status: DISCONTINUED | OUTPATIENT
Start: 2020-01-01 | End: 2020-01-01 | Stop reason: HOSPADM

## 2020-01-01 RX ORDER — POTASSIUM CHLORIDE 1.5 G/1.58G
20-40 POWDER, FOR SOLUTION ORAL
Status: DISCONTINUED | OUTPATIENT
Start: 2020-01-01 | End: 2020-01-01 | Stop reason: HOSPADM

## 2020-01-01 RX ORDER — PEDIATRIC MULTIVIT 61/D3/VIT K 1500-800
1 CAPSULE ORAL DAILY
Status: ON HOLD | COMMUNITY
End: 2020-01-01

## 2020-01-01 RX ORDER — DOXEPIN HYDROCHLORIDE 10 MG/1
20 CAPSULE ORAL AT BEDTIME
Status: DISCONTINUED | OUTPATIENT
Start: 2020-01-01 | End: 2020-01-01

## 2020-01-01 RX ORDER — ERGOCALCIFEROL 1.25 MG/1
50000 CAPSULE, LIQUID FILLED ORAL WEEKLY
Status: DISCONTINUED | OUTPATIENT
Start: 2020-01-01 | End: 2020-01-01 | Stop reason: HOSPADM

## 2020-01-01 RX ORDER — POTASSIUM CHLORIDE 7.45 MG/ML
10 INJECTION INTRAVENOUS
Status: DISCONTINUED | OUTPATIENT
Start: 2020-01-01 | End: 2020-01-01 | Stop reason: HOSPADM

## 2020-01-01 RX ORDER — HYDROMORPHONE HCL/0.9% NACL/PF 0.2MG/0.2
0.2 SYRINGE (ML) INTRAVENOUS
Status: DISCONTINUED | OUTPATIENT
Start: 2020-01-01 | End: 2020-01-01

## 2020-01-01 RX ORDER — KETOROLAC TROMETHAMINE 30 MG/ML
15 INJECTION, SOLUTION INTRAMUSCULAR; INTRAVENOUS ONCE
Status: COMPLETED | OUTPATIENT
Start: 2020-01-01 | End: 2020-01-01

## 2020-01-01 RX ORDER — FUROSEMIDE 20 MG
20 TABLET ORAL
Status: DISCONTINUED | OUTPATIENT
Start: 2020-01-01 | End: 2020-01-01 | Stop reason: HOSPADM

## 2020-01-01 RX ORDER — LIDOCAINE 40 MG/G
CREAM TOPICAL
Status: DISCONTINUED | OUTPATIENT
Start: 2020-01-01 | End: 2020-01-01 | Stop reason: HOSPADM

## 2020-01-01 RX ORDER — FUROSEMIDE 10 MG/ML
40 INJECTION INTRAMUSCULAR; INTRAVENOUS 2 TIMES DAILY
Status: DISCONTINUED | OUTPATIENT
Start: 2020-01-01 | End: 2020-01-01

## 2020-01-01 RX ORDER — DOXEPIN HYDROCHLORIDE 10 MG/1
20 CAPSULE ORAL AT BEDTIME
Status: ON HOLD | COMMUNITY
End: 2020-01-01

## 2020-01-01 RX ORDER — LACTOBACILLUS RHAMNOSUS GG 10B CELL
1 CAPSULE ORAL 2 TIMES DAILY
Status: DISCONTINUED | OUTPATIENT
Start: 2020-01-01 | End: 2020-01-01

## 2020-01-01 RX ORDER — METHOCARBAMOL 500 MG/1
500 TABLET, FILM COATED ORAL
Qty: 10 TABLET | Refills: 0 | DISCHARGE
Start: 2020-01-01

## 2020-01-01 RX ORDER — DEXTROSE, SODIUM CHLORIDE, SODIUM LACTATE, POTASSIUM CHLORIDE, AND CALCIUM CHLORIDE 5; .6; .31; .03; .02 G/100ML; G/100ML; G/100ML; G/100ML; G/100ML
1000 INJECTION, SOLUTION INTRAVENOUS ONCE
Status: COMPLETED | OUTPATIENT
Start: 2020-01-01 | End: 2020-01-01

## 2020-01-01 RX ORDER — NICOTINE POLACRILEX 4 MG
LOZENGE BUCCAL
Status: COMPLETED
Start: 2020-01-01 | End: 2020-01-01

## 2020-01-01 RX ORDER — ASPIRIN 81 MG/1
324 TABLET, CHEWABLE ORAL DAILY
Status: DISCONTINUED | OUTPATIENT
Start: 2020-01-01 | End: 2020-01-01

## 2020-01-01 RX ORDER — POTASSIUM CHLORIDE 1.5 G/1.58G
20 POWDER, FOR SOLUTION ORAL EVERY MORNING
Status: DISCONTINUED | OUTPATIENT
Start: 2020-01-01 | End: 2020-01-01

## 2020-01-01 RX ORDER — POTASSIUM CHLORIDE 1.5 G/1.58G
20 POWDER, FOR SOLUTION ORAL ONCE
Status: DISCONTINUED | OUTPATIENT
Start: 2020-01-01 | End: 2020-01-01

## 2020-01-01 RX ORDER — NALOXONE HYDROCHLORIDE 0.4 MG/ML
.1-.4 INJECTION, SOLUTION INTRAMUSCULAR; INTRAVENOUS; SUBCUTANEOUS
Status: DISCONTINUED | OUTPATIENT
Start: 2020-01-01 | End: 2020-01-01 | Stop reason: HOSPADM

## 2020-01-01 RX ORDER — GADOBUTROL 604.72 MG/ML
7.5 INJECTION INTRAVENOUS ONCE
Status: COMPLETED | OUTPATIENT
Start: 2020-01-01 | End: 2020-01-01

## 2020-01-01 RX ORDER — LOPERAMIDE HCL 2 MG
4 CAPSULE ORAL 4 TIMES DAILY PRN
Status: DISCONTINUED | OUTPATIENT
Start: 2020-01-01 | End: 2020-01-01

## 2020-01-01 RX ORDER — METOPROLOL SUCCINATE 25 MG/1
25 TABLET, EXTENDED RELEASE ORAL DAILY
Status: DISCONTINUED | OUTPATIENT
Start: 2020-01-01 | End: 2020-01-01

## 2020-01-01 RX ORDER — ALBUTEROL SULFATE 90 UG/1
1-2 AEROSOL, METERED RESPIRATORY (INHALATION) EVERY 4 HOURS PRN
Status: DISCONTINUED | OUTPATIENT
Start: 2020-01-01 | End: 2020-01-01 | Stop reason: HOSPADM

## 2020-01-01 RX ORDER — HYDROMORPHONE HYDROCHLORIDE 2 MG/1
2 TABLET ORAL
Status: DISCONTINUED | OUTPATIENT
Start: 2020-01-01 | End: 2020-01-01

## 2020-01-01 RX ORDER — IBUPROFEN 600 MG/1
600 TABLET, FILM COATED ORAL ONCE
Status: COMPLETED | OUTPATIENT
Start: 2020-01-01 | End: 2020-01-01

## 2020-01-01 RX ORDER — LANOLIN ALCOHOL/MO/W.PET/CERES
3 CREAM (GRAM) TOPICAL
Status: DISCONTINUED | OUTPATIENT
Start: 2020-01-01 | End: 2020-01-01

## 2020-01-01 RX ORDER — ACETAMINOPHEN 325 MG/1
650 TABLET ORAL EVERY 4 HOURS PRN
Status: DISCONTINUED | OUTPATIENT
Start: 2020-01-01 | End: 2020-01-01

## 2020-01-01 RX ORDER — POTASSIUM CHLORIDE 29.8 MG/ML
20 INJECTION INTRAVENOUS
Status: DISCONTINUED | OUTPATIENT
Start: 2020-01-01 | End: 2020-01-01 | Stop reason: HOSPADM

## 2020-01-01 RX ORDER — ERGOCALCIFEROL 1.25 MG/1
50000 CAPSULE, LIQUID FILLED ORAL WEEKLY
COMMUNITY

## 2020-01-01 RX ORDER — FUROSEMIDE 40 MG
40 TABLET ORAL DAILY
Status: DISCONTINUED | OUTPATIENT
Start: 2020-01-01 | End: 2020-01-01

## 2020-01-01 RX ORDER — FUROSEMIDE 10 MG/ML
20 INJECTION INTRAMUSCULAR; INTRAVENOUS ONCE
Status: COMPLETED | OUTPATIENT
Start: 2020-01-01 | End: 2020-01-01

## 2020-01-01 RX ORDER — THIAMINE HYDROCHLORIDE 100 MG/ML
100 INJECTION, SOLUTION INTRAMUSCULAR; INTRAVENOUS DAILY
Status: COMPLETED | OUTPATIENT
Start: 2020-01-01 | End: 2020-01-01

## 2020-01-01 RX ORDER — TIZANIDINE 2 MG/1
4 TABLET ORAL AT BEDTIME
Status: DISCONTINUED | OUTPATIENT
Start: 2020-01-01 | End: 2020-01-01

## 2020-01-01 RX ORDER — IOPAMIDOL 755 MG/ML
55 INJECTION, SOLUTION INTRAVASCULAR ONCE
Status: COMPLETED | OUTPATIENT
Start: 2020-01-01 | End: 2020-01-01

## 2020-01-01 RX ORDER — TRAMADOL HYDROCHLORIDE 50 MG/1
50 TABLET ORAL EVERY 6 HOURS PRN
Status: DISCONTINUED | OUTPATIENT
Start: 2020-01-01 | End: 2020-01-01

## 2020-01-01 RX ORDER — POTASSIUM CHLORIDE 1.5 G/1.58G
40 POWDER, FOR SOLUTION ORAL 2 TIMES DAILY
Status: DISCONTINUED | OUTPATIENT
Start: 2020-01-01 | End: 2020-01-01

## 2020-01-01 RX ORDER — LOPERAMIDE HCL 2 MG
2 CAPSULE ORAL 4 TIMES DAILY PRN
Status: DISCONTINUED | OUTPATIENT
Start: 2020-01-01 | End: 2020-01-01 | Stop reason: HOSPADM

## 2020-01-01 RX ORDER — HYDROMORPHONE HCL/0.9% NACL/PF 0.2MG/0.2
0.2 SYRINGE (ML) INTRAVENOUS EVERY 4 HOURS PRN
Status: DISCONTINUED | OUTPATIENT
Start: 2020-01-01 | End: 2020-01-01

## 2020-01-01 RX ORDER — LOPERAMIDE HCL 2 MG
2 CAPSULE ORAL 4 TIMES DAILY PRN
Status: DISCONTINUED | OUTPATIENT
Start: 2020-01-01 | End: 2020-01-01

## 2020-01-01 RX ORDER — MAGNESIUM SULFATE HEPTAHYDRATE 40 MG/ML
4 INJECTION, SOLUTION INTRAVENOUS EVERY 4 HOURS PRN
Status: DISCONTINUED | OUTPATIENT
Start: 2020-01-01 | End: 2020-01-01 | Stop reason: HOSPADM

## 2020-01-01 RX ORDER — ASPIRIN 81 MG/1
81 TABLET, CHEWABLE ORAL DAILY
Status: DISCONTINUED | OUTPATIENT
Start: 2020-01-01 | End: 2020-01-01 | Stop reason: HOSPADM

## 2020-01-01 RX ORDER — FUROSEMIDE 10 MG/ML
40 INJECTION INTRAMUSCULAR; INTRAVENOUS ONCE
Status: COMPLETED | OUTPATIENT
Start: 2020-01-01 | End: 2020-01-01

## 2020-01-01 RX ORDER — FOLIC ACID 1 MG/1
1 TABLET ORAL DAILY
Status: DISCONTINUED | OUTPATIENT
Start: 2020-01-01 | End: 2020-01-01 | Stop reason: HOSPADM

## 2020-01-01 RX ORDER — POTASSIUM CL/LIDO/0.9 % NACL 10MEQ/0.1L
10 INTRAVENOUS SOLUTION, PIGGYBACK (ML) INTRAVENOUS
Status: DISCONTINUED | OUTPATIENT
Start: 2020-01-01 | End: 2020-01-01 | Stop reason: HOSPADM

## 2020-01-01 RX ORDER — SIMETHICONE 80 MG
80 TABLET,CHEWABLE ORAL EVERY 6 HOURS PRN
Status: DISCONTINUED | OUTPATIENT
Start: 2020-01-01 | End: 2020-01-01 | Stop reason: HOSPADM

## 2020-01-01 RX ORDER — POTASSIUM CHLORIDE 1.5 G/1.58G
20 POWDER, FOR SOLUTION ORAL EVERY MORNING
Qty: 100 PACKET | Refills: 3 | Status: SHIPPED | OUTPATIENT
Start: 2020-01-01 | End: 2020-01-01

## 2020-01-01 RX ORDER — ONDANSETRON 4 MG/1
4 TABLET, ORALLY DISINTEGRATING ORAL EVERY 6 HOURS PRN
Status: DISCONTINUED | OUTPATIENT
Start: 2020-01-01 | End: 2020-01-01 | Stop reason: HOSPADM

## 2020-01-01 RX ORDER — DOXEPIN HYDROCHLORIDE 10 MG/1
10 CAPSULE ORAL AT BEDTIME
Status: ON HOLD | COMMUNITY
End: 2020-01-01

## 2020-01-01 RX ORDER — MIDODRINE HYDROCHLORIDE 5 MG/1
5 TABLET ORAL
Status: DISCONTINUED | OUTPATIENT
Start: 2020-01-01 | End: 2020-01-01

## 2020-01-01 RX ORDER — POTASSIUM CHLORIDE 750 MG/1
40 TABLET, EXTENDED RELEASE ORAL ONCE
Status: COMPLETED | OUTPATIENT
Start: 2020-01-01 | End: 2020-01-01

## 2020-01-01 RX ORDER — NICOTINE POLACRILEX 4 MG
15-30 LOZENGE BUCCAL
Status: DISCONTINUED | OUTPATIENT
Start: 2020-01-01 | End: 2020-01-01 | Stop reason: HOSPADM

## 2020-01-01 RX ORDER — DEXTROSE MONOHYDRATE 25 G/50ML
25-50 INJECTION, SOLUTION INTRAVENOUS
Status: DISCONTINUED | OUTPATIENT
Start: 2020-01-01 | End: 2020-01-01

## 2020-01-01 RX ORDER — SODIUM CHLORIDE 9 MG/ML
INJECTION, SOLUTION INTRAVENOUS CONTINUOUS
Status: DISCONTINUED | OUTPATIENT
Start: 2020-01-01 | End: 2020-01-01

## 2020-01-01 RX ORDER — MAGNESIUM SULFATE HEPTAHYDRATE 500 MG/ML
2 INJECTION, SOLUTION INTRAMUSCULAR; INTRAVENOUS ONCE
Status: DISCONTINUED | OUTPATIENT
Start: 2020-01-01 | End: 2020-01-01

## 2020-01-01 RX ORDER — ONDANSETRON 2 MG/ML
4 INJECTION INTRAMUSCULAR; INTRAVENOUS EVERY 6 HOURS PRN
Status: DISCONTINUED | OUTPATIENT
Start: 2020-01-01 | End: 2020-01-01 | Stop reason: HOSPADM

## 2020-01-01 RX ORDER — NALOXONE HYDROCHLORIDE 0.4 MG/ML
.1-.4 INJECTION, SOLUTION INTRAMUSCULAR; INTRAVENOUS; SUBCUTANEOUS
Status: DISCONTINUED | OUTPATIENT
Start: 2020-01-01 | End: 2020-01-01

## 2020-01-01 RX ORDER — FUROSEMIDE 40 MG
40 TABLET ORAL EVERY MORNING
Status: ON HOLD | COMMUNITY
End: 2020-01-01

## 2020-01-01 RX ORDER — LANOLIN ALCOHOL/MO/W.PET/CERES
100 CREAM (GRAM) TOPICAL DAILY
Status: DISCONTINUED | OUTPATIENT
Start: 2020-01-01 | End: 2020-01-01 | Stop reason: HOSPADM

## 2020-01-01 RX ORDER — METHOCARBAMOL 500 MG/1
500 TABLET, FILM COATED ORAL
Qty: 10 TABLET | Refills: 0 | Status: SHIPPED | OUTPATIENT
Start: 2020-01-01 | End: 2020-01-01

## 2020-01-01 RX ORDER — DOBUTAMINE HYDROCHLORIDE 200 MG/100ML
5 INJECTION INTRAVENOUS CONTINUOUS
Status: DISCONTINUED | OUTPATIENT
Start: 2020-01-01 | End: 2020-01-01

## 2020-01-01 RX ORDER — POTASSIUM CHLORIDE 1.5 G/1.58G
20 POWDER, FOR SOLUTION ORAL 2 TIMES DAILY
Status: DISCONTINUED | OUTPATIENT
Start: 2020-01-01 | End: 2020-01-01

## 2020-01-01 RX ORDER — ALBUMIN (HUMAN) 12.5 G/50ML
50 SOLUTION INTRAVENOUS ONCE
Status: COMPLETED | OUTPATIENT
Start: 2020-01-01 | End: 2020-01-01

## 2020-01-01 RX ORDER — GADOBUTROL 604.72 MG/ML
7.5 INJECTION INTRAVENOUS ONCE
Status: DISCONTINUED | OUTPATIENT
Start: 2020-01-01 | End: 2020-01-01

## 2020-01-01 RX ORDER — CALCIUM CARBONATE 500 MG/1
500-1000 TABLET, CHEWABLE ORAL 3 TIMES DAILY PRN
Status: DISCONTINUED | OUTPATIENT
Start: 2020-01-01 | End: 2020-01-01 | Stop reason: HOSPADM

## 2020-01-01 RX ORDER — CARBOXYMETHYLCELLULOSE SODIUM 5 MG/ML
1-2 SOLUTION/ DROPS OPHTHALMIC EVERY 8 HOURS PRN
Status: DISCONTINUED | OUTPATIENT
Start: 2020-01-01 | End: 2020-01-01 | Stop reason: HOSPADM

## 2020-01-01 RX ORDER — POTASSIUM CHLORIDE 1.5 G/1.58G
20 POWDER, FOR SOLUTION ORAL EVERY MORNING
Status: ON HOLD | COMMUNITY
End: 2020-01-01

## 2020-01-01 RX ORDER — MAGNESIUM SULFATE HEPTAHYDRATE 40 MG/ML
2 INJECTION, SOLUTION INTRAVENOUS DAILY PRN
Status: DISCONTINUED | OUTPATIENT
Start: 2020-01-01 | End: 2020-01-01 | Stop reason: HOSPADM

## 2020-01-01 RX ORDER — ACETAMINOPHEN 325 MG/1
650 TABLET ORAL EVERY 4 HOURS PRN
Status: DISCONTINUED | OUTPATIENT
Start: 2020-01-01 | End: 2020-01-01 | Stop reason: HOSPADM

## 2020-01-01 RX ORDER — NICOTINE POLACRILEX 4 MG
15-30 LOZENGE BUCCAL
Status: DISCONTINUED | OUTPATIENT
Start: 2020-01-01 | End: 2020-01-01

## 2020-01-01 RX ORDER — POTASSIUM CHLORIDE 750 MG/1
20-40 TABLET, EXTENDED RELEASE ORAL
Status: DISCONTINUED | OUTPATIENT
Start: 2020-01-01 | End: 2020-01-01 | Stop reason: HOSPADM

## 2020-01-01 RX ORDER — CARBOXYMETHYLCELLULOSE SODIUM 5 MG/ML
1 SOLUTION/ DROPS OPHTHALMIC 4 TIMES DAILY PRN
COMMUNITY

## 2020-01-01 RX ORDER — LANOLIN ALCOHOL/MO/W.PET/CERES
100 CREAM (GRAM) TOPICAL DAILY
COMMUNITY

## 2020-01-01 RX ORDER — IOPAMIDOL 755 MG/ML
66 INJECTION, SOLUTION INTRAVASCULAR ONCE
Status: COMPLETED | OUTPATIENT
Start: 2020-01-01 | End: 2020-01-01

## 2020-01-01 RX ORDER — METOPROLOL SUCCINATE 25 MG/1
12.5 TABLET, EXTENDED RELEASE ORAL EVERY MORNING
Status: ON HOLD | COMMUNITY
End: 2020-01-01

## 2020-01-01 RX ORDER — MIRTAZAPINE 15 MG/1
15 TABLET, FILM COATED ORAL AT BEDTIME
Status: DISCONTINUED | OUTPATIENT
Start: 2020-01-01 | End: 2020-01-01 | Stop reason: HOSPADM

## 2020-01-01 RX ORDER — FOLIC ACID 1 MG/1
1 TABLET ORAL DAILY
COMMUNITY

## 2020-01-01 RX ORDER — MAGNESIUM SULFATE HEPTAHYDRATE 40 MG/ML
4 INJECTION, SOLUTION INTRAVENOUS EVERY 4 HOURS PRN
Status: DISCONTINUED | OUTPATIENT
Start: 2020-01-01 | End: 2020-01-01

## 2020-01-01 RX ORDER — PIPERACILLIN SODIUM, TAZOBACTAM SODIUM 4; .5 G/20ML; G/20ML
4.5 INJECTION, POWDER, LYOPHILIZED, FOR SOLUTION INTRAVENOUS EVERY 6 HOURS
Status: DISCONTINUED | OUTPATIENT
Start: 2020-01-01 | End: 2020-01-01

## 2020-01-01 RX ORDER — POTASSIUM CL/LIDO/0.9 % NACL 10MEQ/0.1L
10 INTRAVENOUS SOLUTION, PIGGYBACK (ML) INTRAVENOUS ONCE
Status: COMPLETED | OUTPATIENT
Start: 2020-01-01 | End: 2020-01-01

## 2020-01-01 RX ORDER — VENLAFAXINE HYDROCHLORIDE 37.5 MG/1
37.5 CAPSULE, EXTENDED RELEASE ORAL DAILY
Status: DISCONTINUED | OUTPATIENT
Start: 2020-01-01 | End: 2020-01-01 | Stop reason: HOSPADM

## 2020-01-01 RX ORDER — VANCOMYCIN HYDROCHLORIDE 1 G/200ML
1000 INJECTION, SOLUTION INTRAVENOUS EVERY 24 HOURS
Status: DISCONTINUED | OUTPATIENT
Start: 2020-01-01 | End: 2020-01-01

## 2020-01-01 RX ORDER — LEVOFLOXACIN 5 MG/ML
500 INJECTION, SOLUTION INTRAVENOUS ONCE
Status: DISCONTINUED | OUTPATIENT
Start: 2020-01-01 | End: 2020-01-01

## 2020-01-01 RX ORDER — LANOLIN ALCOHOL/MO/W.PET/CERES
3 CREAM (GRAM) TOPICAL
Status: DISCONTINUED | OUTPATIENT
Start: 2020-01-01 | End: 2020-01-01 | Stop reason: HOSPADM

## 2020-01-01 RX ORDER — ASPIRIN 81 MG/1
81 TABLET ORAL DAILY
Status: DISCONTINUED | OUTPATIENT
Start: 2020-01-01 | End: 2020-01-01

## 2020-01-01 RX ORDER — HYDROMORPHONE HCL/0.9% NACL/PF 0.2MG/0.2
.2-.4 SYRINGE (ML) INTRAVENOUS EVERY 30 MIN PRN
Status: DISCONTINUED | OUTPATIENT
Start: 2020-01-01 | End: 2020-01-01 | Stop reason: HOSPADM

## 2020-01-01 RX ORDER — LEVOFLOXACIN 5 MG/ML
750 INJECTION, SOLUTION INTRAVENOUS ONCE
Status: COMPLETED | OUTPATIENT
Start: 2020-01-01 | End: 2020-01-01

## 2020-01-01 RX ORDER — DOXYCYCLINE 100 MG/10ML
100 INJECTION, POWDER, LYOPHILIZED, FOR SOLUTION INTRAVENOUS EVERY 12 HOURS
Status: DISCONTINUED | OUTPATIENT
Start: 2020-01-01 | End: 2020-01-01

## 2020-01-01 RX ORDER — POTASSIUM CHLORIDE 1500 MG/1
60 TABLET, EXTENDED RELEASE ORAL ONCE
Status: COMPLETED | OUTPATIENT
Start: 2020-01-01 | End: 2020-01-01

## 2020-01-01 RX ORDER — POTASSIUM CHLORIDE 1.5 G/1.58G
40 POWDER, FOR SOLUTION ORAL 3 TIMES DAILY
Status: DISCONTINUED | OUTPATIENT
Start: 2020-01-01 | End: 2020-01-01

## 2020-01-01 RX ORDER — FUROSEMIDE 20 MG
20 TABLET ORAL DAILY
DISCHARGE
Start: 2020-01-01

## 2020-01-01 RX ORDER — POTASSIUM CHLORIDE 750 MG/1
20 TABLET, EXTENDED RELEASE ORAL ONCE
Status: COMPLETED | OUTPATIENT
Start: 2020-01-01 | End: 2020-01-01

## 2020-01-01 RX ORDER — AMPICILLIN AND SULBACTAM 2; 1 G/1; G/1
3 INJECTION, POWDER, FOR SOLUTION INTRAMUSCULAR; INTRAVENOUS EVERY 6 HOURS
Status: DISCONTINUED | OUTPATIENT
Start: 2020-01-01 | End: 2020-01-01

## 2020-01-01 RX ORDER — FUROSEMIDE 20 MG
20 TABLET ORAL DAILY
Status: DISCONTINUED | OUTPATIENT
Start: 2020-01-01 | End: 2020-01-01

## 2020-01-01 RX ORDER — LANOLIN ALCOHOL/MO/W.PET/CERES
3 CREAM (GRAM) TOPICAL
Qty: 60 TABLET | Refills: 1 | Status: SHIPPED | OUTPATIENT
Start: 2020-01-01

## 2020-01-01 RX ORDER — DOXEPIN HYDROCHLORIDE 10 MG/1
10 CAPSULE ORAL AT BEDTIME
Status: DISCONTINUED | OUTPATIENT
Start: 2020-01-01 | End: 2020-01-01 | Stop reason: HOSPADM

## 2020-01-01 RX ORDER — SUCRALFATE 1 G/1
1 TABLET ORAL
Status: DISCONTINUED | OUTPATIENT
Start: 2020-01-01 | End: 2020-01-01 | Stop reason: HOSPADM

## 2020-01-01 RX ORDER — GABAPENTIN 100 MG/1
100 CAPSULE ORAL AT BEDTIME
Status: DISCONTINUED | OUTPATIENT
Start: 2020-01-01 | End: 2020-01-01 | Stop reason: HOSPADM

## 2020-01-01 RX ORDER — ATORVASTATIN CALCIUM 20 MG/1
20 TABLET, FILM COATED ORAL EVERY EVENING
Status: DISCONTINUED | OUTPATIENT
Start: 2020-01-01 | End: 2020-01-01 | Stop reason: HOSPADM

## 2020-01-01 RX ORDER — MULTIPLE VITAMINS W/ MINERALS TAB 9MG-400MCG
1 TAB ORAL DAILY
Status: DISCONTINUED | OUTPATIENT
Start: 2020-01-01 | End: 2020-01-01 | Stop reason: HOSPADM

## 2020-01-01 RX ORDER — DEXTROSE MONOHYDRATE 25 G/50ML
25-50 INJECTION, SOLUTION INTRAVENOUS
Status: DISCONTINUED | OUTPATIENT
Start: 2020-01-01 | End: 2020-01-01 | Stop reason: HOSPADM

## 2020-01-01 RX ORDER — VENLAFAXINE HYDROCHLORIDE 37.5 MG/1
37.5 CAPSULE, EXTENDED RELEASE ORAL DAILY
COMMUNITY

## 2020-01-01 RX ORDER — MULTIVITAMIN,THERAPEUTIC
1 TABLET ORAL DAILY
DISCHARGE
Start: 2020-01-01

## 2020-01-01 RX ORDER — CELECOXIB 100 MG/1
100 CAPSULE ORAL 2 TIMES DAILY PRN
Status: DISCONTINUED | OUTPATIENT
Start: 2020-01-01 | End: 2020-01-01 | Stop reason: HOSPADM

## 2020-01-01 RX ORDER — OXYCODONE HYDROCHLORIDE 5 MG/1
5 TABLET ORAL ONCE
Status: COMPLETED | OUTPATIENT
Start: 2020-01-01 | End: 2020-01-01

## 2020-01-01 RX ORDER — SODIUM CHLORIDE, SODIUM LACTATE, POTASSIUM CHLORIDE, CALCIUM CHLORIDE 600; 310; 30; 20 MG/100ML; MG/100ML; MG/100ML; MG/100ML
INJECTION, SOLUTION INTRAVENOUS CONTINUOUS
Status: DISCONTINUED | OUTPATIENT
Start: 2020-01-01 | End: 2020-01-01

## 2020-01-01 RX ORDER — HEPARIN SODIUM,PORCINE 10 UNIT/ML
5-10 VIAL (ML) INTRAVENOUS
Status: DISCONTINUED | OUTPATIENT
Start: 2020-01-01 | End: 2020-01-01 | Stop reason: HOSPADM

## 2020-01-01 RX ORDER — GABAPENTIN 100 MG/1
100 CAPSULE ORAL AT BEDTIME
COMMUNITY

## 2020-01-01 RX ORDER — POTASSIUM CL/LIDO/0.9 % NACL 10MEQ/0.1L
10 INTRAVENOUS SOLUTION, PIGGYBACK (ML) INTRAVENOUS
Status: COMPLETED | OUTPATIENT
Start: 2020-01-01 | End: 2020-01-01

## 2020-01-01 RX ORDER — LANOLIN ALCOHOL/MO/W.PET/CERES
6 CREAM (GRAM) TOPICAL
Status: DISCONTINUED | OUTPATIENT
Start: 2020-01-01 | End: 2020-01-01 | Stop reason: HOSPADM

## 2020-01-01 RX ORDER — PROCHLORPERAZINE 25 MG
12.5 SUPPOSITORY, RECTAL RECTAL EVERY 12 HOURS PRN
Status: DISCONTINUED | OUTPATIENT
Start: 2020-01-01 | End: 2020-01-01 | Stop reason: HOSPADM

## 2020-01-01 RX ORDER — IBUPROFEN 200 MG
950 CAPSULE ORAL ONCE
Status: COMPLETED | OUTPATIENT
Start: 2020-01-01 | End: 2020-01-01

## 2020-01-01 RX ORDER — MIRTAZAPINE 15 MG/1
15 TABLET, FILM COATED ORAL AT BEDTIME
Status: DISCONTINUED | OUTPATIENT
Start: 2020-01-01 | End: 2020-01-01

## 2020-01-01 RX ORDER — LACTOBACILLUS RHAMNOSUS GG 10B CELL
1 CAPSULE ORAL 2 TIMES DAILY
COMMUNITY

## 2020-01-01 RX ORDER — OXYCODONE HYDROCHLORIDE 5 MG/1
5 TABLET ORAL EVERY 6 HOURS PRN
Status: DISCONTINUED | OUTPATIENT
Start: 2020-01-01 | End: 2020-01-01 | Stop reason: HOSPADM

## 2020-01-01 RX ORDER — ALBUTEROL SULFATE 90 UG/1
1-2 AEROSOL, METERED RESPIRATORY (INHALATION) EVERY 4 HOURS PRN
COMMUNITY

## 2020-01-01 RX ORDER — HEPARIN SODIUM 10000 [USP'U]/100ML
0-3500 INJECTION, SOLUTION INTRAVENOUS CONTINUOUS
Status: DISCONTINUED | OUTPATIENT
Start: 2020-01-01 | End: 2020-01-01

## 2020-01-01 RX ORDER — PROCHLORPERAZINE MALEATE 5 MG
5 TABLET ORAL EVERY 6 HOURS PRN
Status: DISCONTINUED | OUTPATIENT
Start: 2020-01-01 | End: 2020-01-01 | Stop reason: HOSPADM

## 2020-01-01 RX ORDER — ACETAMINOPHEN 650 MG/1
650 SUPPOSITORY RECTAL EVERY 6 HOURS PRN
Status: DISCONTINUED | OUTPATIENT
Start: 2020-01-01 | End: 2020-01-01 | Stop reason: HOSPADM

## 2020-01-01 RX ORDER — POTASSIUM CHLORIDE 1.5 G/1.58G
20 POWDER, FOR SOLUTION ORAL 2 TIMES DAILY
COMMUNITY

## 2020-01-01 RX ORDER — MIDODRINE HYDROCHLORIDE 5 MG/1
5 TABLET ORAL
DISCHARGE
Start: 2020-01-01

## 2020-01-01 RX ORDER — NOREPINEPHRINE BITARTRATE 0.06 MG/ML
.03-.8 INJECTION, SOLUTION INTRAVENOUS CONTINUOUS
Status: DISCONTINUED | OUTPATIENT
Start: 2020-01-01 | End: 2020-01-01

## 2020-01-01 RX ORDER — MULTIVITAMIN,THERAPEUTIC
1 TABLET ORAL DAILY
Status: DISCONTINUED | OUTPATIENT
Start: 2020-01-01 | End: 2020-01-01 | Stop reason: HOSPADM

## 2020-01-01 RX ORDER — HEPARIN SODIUM,PORCINE 10 UNIT/ML
5-10 VIAL (ML) INTRAVENOUS EVERY 24 HOURS
Status: DISCONTINUED | OUTPATIENT
Start: 2020-01-01 | End: 2020-01-01 | Stop reason: HOSPADM

## 2020-01-01 RX ORDER — FUROSEMIDE 40 MG
20 TABLET ORAL DAILY PRN
Qty: 30 TABLET | Refills: 0 | Status: ON HOLD | OUTPATIENT
Start: 2020-01-01 | End: 2020-01-01

## 2020-01-01 RX ORDER — IBUPROFEN 200 MG
950 CAPSULE ORAL DAILY
Status: DISCONTINUED | OUTPATIENT
Start: 2020-01-01 | End: 2020-01-01

## 2020-01-01 RX ADMIN — MULTIPLE VITAMINS W/ MINERALS TAB 1 TABLET: TAB at 07:57

## 2020-01-01 RX ADMIN — OMEPRAZOLE 20 MG: 20 CAPSULE, DELAYED RELEASE ORAL at 17:17

## 2020-01-01 RX ADMIN — OMEPRAZOLE 20 MG: 20 CAPSULE, DELAYED RELEASE ORAL at 12:26

## 2020-01-01 RX ADMIN — PIPERACILLIN SODIUM AND TAZOBACTAM SODIUM 4.5 G: 4; .5 INJECTION, POWDER, LYOPHILIZED, FOR SOLUTION INTRAVENOUS at 06:02

## 2020-01-01 RX ADMIN — FUROSEMIDE 20 MG: 10 INJECTION, SOLUTION INTRAVENOUS at 14:27

## 2020-01-01 RX ADMIN — ASPIRIN 81 MG CHEWABLE TABLET 81 MG: 81 TABLET CHEWABLE at 19:45

## 2020-01-01 RX ADMIN — POTASSIUM CHLORIDE 20 MEQ: 750 TABLET, EXTENDED RELEASE ORAL at 08:22

## 2020-01-01 RX ADMIN — PIPERACILLIN SODIUM AND TAZOBACTAM SODIUM 4.5 G: 4; .5 INJECTION, POWDER, LYOPHILIZED, FOR SOLUTION INTRAVENOUS at 01:46

## 2020-01-01 RX ADMIN — THIAMINE HCL TAB 100 MG 100 MG: 100 TAB at 09:10

## 2020-01-01 RX ADMIN — ACETAMINOPHEN 650 MG: 325 TABLET, FILM COATED ORAL at 21:26

## 2020-01-01 RX ADMIN — MELATONIN TAB 3 MG 6 MG: 3 TAB at 20:37

## 2020-01-01 RX ADMIN — Medication 3 MG: at 20:46

## 2020-01-01 RX ADMIN — LOPERAMIDE HYDROCHLORIDE 2 MG: 2 CAPSULE ORAL at 20:01

## 2020-01-01 RX ADMIN — TRAMADOL HYDROCHLORIDE 50 MG: 50 TABLET, COATED ORAL at 15:47

## 2020-01-01 RX ADMIN — LOPERAMIDE HYDROCHLORIDE 2 MG: 2 CAPSULE ORAL at 13:49

## 2020-01-01 RX ADMIN — ACETAMINOPHEN 650 MG: 325 TABLET, FILM COATED ORAL at 21:18

## 2020-01-01 RX ADMIN — MIRTAZAPINE 15 MG: 15 TABLET, FILM COATED ORAL at 20:38

## 2020-01-01 RX ADMIN — MAGNESIUM SULFATE IN WATER 2 G: 40 INJECTION, SOLUTION INTRAVENOUS at 15:23

## 2020-01-01 RX ADMIN — DEXTROSE 15 G: 15 GEL ORAL at 08:32

## 2020-01-01 RX ADMIN — FOLIC ACID 1 MG: 1 TABLET ORAL at 09:23

## 2020-01-01 RX ADMIN — OXYCODONE HYDROCHLORIDE 5 MG: 5 TABLET ORAL at 03:31

## 2020-01-01 RX ADMIN — LOPERAMIDE HYDROCHLORIDE 2 MG: 2 CAPSULE ORAL at 12:48

## 2020-01-01 RX ADMIN — LOPERAMIDE HYDROCHLORIDE 2 MG: 2 CAPSULE ORAL at 11:00

## 2020-01-01 RX ADMIN — GABAPENTIN 100 MG: 100 CAPSULE ORAL at 20:16

## 2020-01-01 RX ADMIN — DOXEPIN HYDROCHLORIDE 20 MG: 10 CAPSULE ORAL at 21:06

## 2020-01-01 RX ADMIN — THIAMINE HCL TAB 100 MG 100 MG: 100 TAB at 08:27

## 2020-01-01 RX ADMIN — MIDODRINE HYDROCHLORIDE 5 MG: 5 TABLET ORAL at 09:21

## 2020-01-01 RX ADMIN — ENOXAPARIN SODIUM 40 MG: 40 INJECTION SUBCUTANEOUS at 17:04

## 2020-01-01 RX ADMIN — POTASSIUM CHLORIDE 20 MEQ: 1.5 POWDER, FOR SOLUTION ORAL at 08:28

## 2020-01-01 RX ADMIN — FOLIC ACID 1 MG: 1 TABLET ORAL at 08:26

## 2020-01-01 RX ADMIN — CALCIUM CITRATE 200 MG (950 MG) TABLET 950 MG: at 21:30

## 2020-01-01 RX ADMIN — Medication 3 MG: at 20:27

## 2020-01-01 RX ADMIN — LOPERAMIDE HYDROCHLORIDE 2 MG: 2 CAPSULE ORAL at 09:27

## 2020-01-01 RX ADMIN — ASPIRIN 81 MG: 81 TABLET, FILM COATED ORAL at 08:22

## 2020-01-01 RX ADMIN — GABAPENTIN 400 MG: 100 CAPSULE ORAL at 21:30

## 2020-01-01 RX ADMIN — ACETAMINOPHEN 650 MG: 325 TABLET, FILM COATED ORAL at 00:04

## 2020-01-01 RX ADMIN — SODIUM BICARBONATE 30 ML/HR: 84 INJECTION, SOLUTION INTRAVENOUS at 07:55

## 2020-01-01 RX ADMIN — ASPIRIN 81 MG CHEWABLE TABLET 81 MG: 81 TABLET CHEWABLE at 08:08

## 2020-01-01 RX ADMIN — GABAPENTIN 100 MG: 100 CAPSULE ORAL at 22:46

## 2020-01-01 RX ADMIN — PIPERACILLIN SODIUM AND TAZOBACTAM SODIUM 4.5 G: 4; .5 INJECTION, POWDER, LYOPHILIZED, FOR SOLUTION INTRAVENOUS at 08:10

## 2020-01-01 RX ADMIN — MAGNESIUM SULFATE 2 G: 2 INJECTION INTRAVENOUS at 08:34

## 2020-01-01 RX ADMIN — ACETAMINOPHEN 650 MG: 325 TABLET, FILM COATED ORAL at 21:13

## 2020-01-01 RX ADMIN — ENOXAPARIN SODIUM 40 MG: 40 INJECTION SUBCUTANEOUS at 18:02

## 2020-01-01 RX ADMIN — Medication 0.4 MG: at 05:27

## 2020-01-01 RX ADMIN — OMEPRAZOLE 20 MG: 20 CAPSULE, DELAYED RELEASE ORAL at 17:40

## 2020-01-01 RX ADMIN — ACETAMINOPHEN 650 MG: 325 TABLET, FILM COATED ORAL at 09:05

## 2020-01-01 RX ADMIN — MIDODRINE HYDROCHLORIDE 5 MG: 5 TABLET ORAL at 17:04

## 2020-01-01 RX ADMIN — POTASSIUM CHLORIDE 40 MEQ: 1.5 POWDER, FOR SOLUTION ORAL at 14:27

## 2020-01-01 RX ADMIN — POTASSIUM CHLORIDE 40 MEQ: 1.5 POWDER, FOR SOLUTION ORAL at 18:44

## 2020-01-01 RX ADMIN — FOLIC ACID 1 MG: 1 TABLET ORAL at 09:06

## 2020-01-01 RX ADMIN — Medication 0.4 MG: at 18:38

## 2020-01-01 RX ADMIN — OXYCODONE HYDROCHLORIDE 5 MG: 5 TABLET ORAL at 04:22

## 2020-01-01 RX ADMIN — VENLAFAXINE HYDROCHLORIDE 37.5 MG: 37.5 CAPSULE, EXTENDED RELEASE ORAL at 09:22

## 2020-01-01 RX ADMIN — OMEPRAZOLE 20 MG: 20 CAPSULE, DELAYED RELEASE ORAL at 11:34

## 2020-01-01 RX ADMIN — Medication 1 CAPSULE: at 09:11

## 2020-01-01 RX ADMIN — HUMAN ALBUMIN MICROSPHERES AND PERFLUTREN 5 ML: 10; .22 INJECTION, SOLUTION INTRAVENOUS at 15:30

## 2020-01-01 RX ADMIN — ASPIRIN 81 MG: 81 TABLET, FILM COATED ORAL at 08:07

## 2020-01-01 RX ADMIN — LOPERAMIDE HYDROCHLORIDE 2 MG: 2 CAPSULE ORAL at 18:06

## 2020-01-01 RX ADMIN — MAGNESIUM SULFATE 2 G: 2 INJECTION INTRAVENOUS at 17:17

## 2020-01-01 RX ADMIN — SODIUM CHLORIDE 1000 ML: 9 INJECTION, SOLUTION INTRAVENOUS at 23:43

## 2020-01-01 RX ADMIN — Medication 12.5 MG: at 08:46

## 2020-01-01 RX ADMIN — Medication 1 MG: at 20:27

## 2020-01-01 RX ADMIN — MAGNESIUM SULFATE IN WATER 4 G: 40 INJECTION, SOLUTION INTRAVENOUS at 06:09

## 2020-01-01 RX ADMIN — PIPERACILLIN SODIUM AND TAZOBACTAM SODIUM 4.5 G: 4; .5 INJECTION, POWDER, LYOPHILIZED, FOR SOLUTION INTRAVENOUS at 19:35

## 2020-01-01 RX ADMIN — POTASSIUM CHLORIDE 20 MEQ: 1.5 POWDER, FOR SOLUTION ORAL at 07:31

## 2020-01-01 RX ADMIN — POTASSIUM CHLORIDE 20 MEQ: 750 TABLET, EXTENDED RELEASE ORAL at 08:08

## 2020-01-01 RX ADMIN — VASOPRESSIN 4 UNITS/HR: 20 INJECTION INTRAVENOUS at 11:19

## 2020-01-01 RX ADMIN — ACETAMINOPHEN 650 MG: 325 TABLET, FILM COATED ORAL at 16:28

## 2020-01-01 RX ADMIN — METHOCARBAMOL 500 MG: 500 TABLET, FILM COATED ORAL at 20:46

## 2020-01-01 RX ADMIN — FUROSEMIDE 40 MG: 10 INJECTION, SOLUTION INTRAVENOUS at 20:24

## 2020-01-01 RX ADMIN — TIZANIDINE 4 MG: 4 TABLET ORAL at 21:19

## 2020-01-01 RX ADMIN — INFLUENZA A VIRUS A/MICHIGAN/45/2015 X-275 (H1N1) ANTIGEN (FORMALDEHYDE INACTIVATED), INFLUENZA A VIRUS A/SINGAPORE/INFIMH-16-0019/2016 IVR-186 (H3N2) ANTIGEN (FORMALDEHYDE INACTIVATED), INFLUENZA B VIRUS B/PHUKET/3073/2013 ANTIGEN (FORMALDEHYDE INACTIVATED), AND INFLUENZA B VIRUS B/MARYLAND/15/2016 BX-69A ANTIGEN (FORMALDEHYDE INACTIVATED) 0.7 ML: 60; 60; 60; 60 INJECTION, SUSPENSION INTRAMUSCULAR at 17:04

## 2020-01-01 RX ADMIN — SIMETHICONE 80 MG: 80 TABLET, CHEWABLE ORAL at 13:15

## 2020-01-01 RX ADMIN — OXYCODONE HYDROCHLORIDE 5 MG: 5 TABLET ORAL at 11:23

## 2020-01-01 RX ADMIN — ACETAMINOPHEN 650 MG: 325 TABLET, FILM COATED ORAL at 11:34

## 2020-01-01 RX ADMIN — ENOXAPARIN SODIUM 30 MG: 30 INJECTION SUBCUTANEOUS at 08:22

## 2020-01-01 RX ADMIN — LOPERAMIDE HYDROCHLORIDE 2 MG: 2 CAPSULE ORAL at 14:01

## 2020-01-01 RX ADMIN — IOPAMIDOL 55 ML: 755 INJECTION, SOLUTION INTRAVENOUS at 11:49

## 2020-01-01 RX ADMIN — OMEPRAZOLE 20 MG: 20 CAPSULE, DELAYED RELEASE ORAL at 08:26

## 2020-01-01 RX ADMIN — ASPIRIN 81 MG: 81 TABLET, FILM COATED ORAL at 07:57

## 2020-01-01 RX ADMIN — ACETAMINOPHEN 650 MG: 325 TABLET, FILM COATED ORAL at 22:39

## 2020-01-01 RX ADMIN — ACETAMINOPHEN 650 MG: 325 TABLET, FILM COATED ORAL at 14:59

## 2020-01-01 RX ADMIN — Medication 3 MG: at 20:35

## 2020-01-01 RX ADMIN — ASPIRIN 81 MG CHEWABLE TABLET 81 MG: 81 TABLET CHEWABLE at 07:55

## 2020-01-01 RX ADMIN — OXYCODONE HYDROCHLORIDE 5 MG: 5 TABLET ORAL at 20:33

## 2020-01-01 RX ADMIN — Medication 10 MEQ: at 13:07

## 2020-01-01 RX ADMIN — KETOROLAC TROMETHAMINE 15 MG: 30 INJECTION, SOLUTION INTRAMUSCULAR at 01:44

## 2020-01-01 RX ADMIN — KETOROLAC TROMETHAMINE 15 MG: 30 INJECTION, SOLUTION INTRAMUSCULAR at 22:12

## 2020-01-01 RX ADMIN — LOPERAMIDE HYDROCHLORIDE 2 MG: 2 CAPSULE ORAL at 12:01

## 2020-01-01 RX ADMIN — IOPAMIDOL 100 ML: 755 INJECTION, SOLUTION INTRAVENOUS at 18:09

## 2020-01-01 RX ADMIN — CALCIUM GLUCONATE 1 G: 98 INJECTION, SOLUTION INTRAVENOUS at 01:28

## 2020-01-01 RX ADMIN — OMEPRAZOLE 20 MG: 20 CAPSULE, DELAYED RELEASE ORAL at 09:11

## 2020-01-01 RX ADMIN — THIAMINE HYDROCHLORIDE 100 MG: 100 INJECTION, SOLUTION INTRAMUSCULAR; INTRAVENOUS at 09:49

## 2020-01-01 RX ADMIN — OMEPRAZOLE 20 MG: 20 CAPSULE, DELAYED RELEASE ORAL at 19:29

## 2020-01-01 RX ADMIN — POTASSIUM CHLORIDE 60 MEQ: 1500 TABLET, EXTENDED RELEASE ORAL at 13:35

## 2020-01-01 RX ADMIN — DOBUTAMINE HYDROCHLORIDE 2.5 MCG/KG/MIN: 200 INJECTION INTRAVENOUS at 18:51

## 2020-01-01 RX ADMIN — OMEPRAZOLE 20 MG: 20 CAPSULE, DELAYED RELEASE ORAL at 08:08

## 2020-01-01 RX ADMIN — LOPERAMIDE HYDROCHLORIDE 2 MG: 2 CAPSULE ORAL at 10:12

## 2020-01-01 RX ADMIN — FOLIC ACID 1 MG: 1 TABLET ORAL at 09:11

## 2020-01-01 RX ADMIN — Medication 12.5 MG: at 08:07

## 2020-01-01 RX ADMIN — AMPICILLIN SODIUM AND SULBACTAM SODIUM 3 G: 2; 1 INJECTION, POWDER, FOR SOLUTION INTRAMUSCULAR; INTRAVENOUS at 21:09

## 2020-01-01 RX ADMIN — MAGNESIUM SULFATE IN WATER 2 G: 40 INJECTION, SOLUTION INTRAVENOUS at 13:07

## 2020-01-01 RX ADMIN — LEVOFLOXACIN 750 MG: 5 INJECTION, SOLUTION INTRAVENOUS at 03:36

## 2020-01-01 RX ADMIN — POTASSIUM CHLORIDE 40 MEQ: 750 TABLET, EXTENDED RELEASE ORAL at 01:04

## 2020-01-01 RX ADMIN — Medication 1 CAPSULE: at 09:24

## 2020-01-01 RX ADMIN — POTASSIUM CHLORIDE 20 MEQ: 1.5 POWDER, FOR SOLUTION ORAL at 08:08

## 2020-01-01 RX ADMIN — SUCRALFATE 1 G: 1 TABLET ORAL at 17:04

## 2020-01-01 RX ADMIN — ERGOCALCIFEROL 50000 UNITS: 1.25 CAPSULE, LIQUID FILLED ORAL at 09:07

## 2020-01-01 RX ADMIN — MELATONIN TAB 3 MG 3 MG: 3 TAB at 21:26

## 2020-01-01 RX ADMIN — GABAPENTIN 400 MG: 300 CAPSULE ORAL at 20:33

## 2020-01-01 RX ADMIN — Medication 1 CAPSULE: at 09:21

## 2020-01-01 RX ADMIN — PROCHLORPERAZINE EDISYLATE 5 MG: 5 INJECTION INTRAMUSCULAR; INTRAVENOUS at 15:52

## 2020-01-01 RX ADMIN — LOPERAMIDE HYDROCHLORIDE 2 MG: 2 CAPSULE ORAL at 20:35

## 2020-01-01 RX ADMIN — FUROSEMIDE 20 MG: 20 TABLET ORAL at 08:08

## 2020-01-01 RX ADMIN — MULTIPLE VITAMINS W/ MINERALS TAB 1 TABLET: TAB at 16:31

## 2020-01-01 RX ADMIN — SUCRALFATE 1 G: 1 TABLET ORAL at 09:05

## 2020-01-01 RX ADMIN — SUCRALFATE 1 G: 1 TABLET ORAL at 14:26

## 2020-01-01 RX ADMIN — NOREPINEPHRINE BITARTRATE 0.5 MCG/KG/MIN: 0.06 INJECTION, SOLUTION INTRAVENOUS at 08:54

## 2020-01-01 RX ADMIN — Medication 1 CAPSULE: at 20:00

## 2020-01-01 RX ADMIN — SIMETHICONE CHEW TAB 80 MG 80 MG: 80 TABLET ORAL at 12:28

## 2020-01-01 RX ADMIN — GABAPENTIN 100 MG: 100 CAPSULE ORAL at 21:17

## 2020-01-01 RX ADMIN — SUCRALFATE 1 G: 1 TABLET ORAL at 20:00

## 2020-01-01 RX ADMIN — OMEPRAZOLE 20 MG: 20 CAPSULE, DELAYED RELEASE ORAL at 08:46

## 2020-01-01 RX ADMIN — HYDROCORTISONE SODIUM SUCCINATE 50 MG: 100 INJECTION, POWDER, FOR SOLUTION INTRAMUSCULAR; INTRAVENOUS at 08:24

## 2020-01-01 RX ADMIN — Medication 10 MEQ: at 14:33

## 2020-01-01 RX ADMIN — Medication 12.5 MG: at 08:08

## 2020-01-01 RX ADMIN — SODIUM CHLORIDE, POTASSIUM CHLORIDE, SODIUM LACTATE AND CALCIUM CHLORIDE 500 ML: 600; 310; 30; 20 INJECTION, SOLUTION INTRAVENOUS at 00:37

## 2020-01-01 RX ADMIN — POTASSIUM CHLORIDE 40 MEQ: 1.5 POWDER, FOR SOLUTION ORAL at 09:24

## 2020-01-01 RX ADMIN — DOXYCYCLINE 100 MG: 100 INJECTION, POWDER, LYOPHILIZED, FOR SOLUTION INTRAVENOUS at 23:28

## 2020-01-01 RX ADMIN — THIAMINE HYDROCHLORIDE 100 MG: 100 INJECTION, SOLUTION INTRAMUSCULAR; INTRAVENOUS at 08:09

## 2020-01-01 RX ADMIN — Medication 0.2 MG: at 20:43

## 2020-01-01 RX ADMIN — Medication 1 MG: at 19:13

## 2020-01-01 RX ADMIN — SODIUM BICARBONATE 30 ML/HR: 84 INJECTION, SOLUTION INTRAVENOUS at 03:18

## 2020-01-01 RX ADMIN — LOPERAMIDE HYDROCHLORIDE 2 MG: 2 CAPSULE ORAL at 11:33

## 2020-01-01 RX ADMIN — PIPERACILLIN SODIUM AND TAZOBACTAM SODIUM 4.5 G: 4; .5 INJECTION, POWDER, LYOPHILIZED, FOR SOLUTION INTRAVENOUS at 21:09

## 2020-01-01 RX ADMIN — OMEPRAZOLE 20 MG: 20 CAPSULE, DELAYED RELEASE ORAL at 16:36

## 2020-01-01 RX ADMIN — SODIUM CHLORIDE, POTASSIUM CHLORIDE, SODIUM LACTATE AND CALCIUM CHLORIDE: 600; 310; 30; 20 INJECTION, SOLUTION INTRAVENOUS at 18:18

## 2020-01-01 RX ADMIN — MIDODRINE HYDROCHLORIDE 5 MG: 5 TABLET ORAL at 09:11

## 2020-01-01 RX ADMIN — ACETAMINOPHEN 650 MG: 325 TABLET, FILM COATED ORAL at 15:07

## 2020-01-01 RX ADMIN — SODIUM BICARBONATE 15 ML/HR: 84 INJECTION, SOLUTION INTRAVENOUS at 22:42

## 2020-01-01 RX ADMIN — POTASSIUM CHLORIDE 20 MEQ: 1.5 POWDER, FOR SOLUTION ORAL at 07:55

## 2020-01-01 RX ADMIN — DEXTROSE MONOHYDRATE 25 ML: 500 INJECTION PARENTERAL at 17:32

## 2020-01-01 RX ADMIN — GABAPENTIN 400 MG: 100 CAPSULE ORAL at 21:06

## 2020-01-01 RX ADMIN — ACETAMINOPHEN 1000 MG: 500 TABLET, FILM COATED ORAL at 12:57

## 2020-01-01 RX ADMIN — TIZANIDINE 4 MG: 4 TABLET ORAL at 20:15

## 2020-01-01 RX ADMIN — AMPICILLIN SODIUM AND SULBACTAM SODIUM 3 G: 2; 1 INJECTION, POWDER, FOR SOLUTION INTRAMUSCULAR; INTRAVENOUS at 02:56

## 2020-01-01 RX ADMIN — SUCRALFATE 1 G: 1 TABLET ORAL at 12:01

## 2020-01-01 RX ADMIN — HUMAN ALBUMIN MICROSPHERES AND PERFLUTREN 5 ML: 10; .22 INJECTION, SOLUTION INTRAVENOUS at 08:00

## 2020-01-01 RX ADMIN — OMEPRAZOLE 20 MG: 20 CAPSULE, DELAYED RELEASE ORAL at 13:13

## 2020-01-01 RX ADMIN — LOPERAMIDE HYDROCHLORIDE 2 MG: 2 CAPSULE ORAL at 13:35

## 2020-01-01 RX ADMIN — SODIUM CHLORIDE, POTASSIUM CHLORIDE, SODIUM LACTATE AND CALCIUM CHLORIDE: 600; 310; 30; 20 INJECTION, SOLUTION INTRAVENOUS at 02:01

## 2020-01-01 RX ADMIN — OMEPRAZOLE 20 MG: 20 CAPSULE, DELAYED RELEASE ORAL at 16:46

## 2020-01-01 RX ADMIN — NOREPINEPHRINE BITARTRATE 0.05 MCG/KG/MIN: 0.06 INJECTION, SOLUTION INTRAVENOUS at 18:51

## 2020-01-01 RX ADMIN — POTASSIUM PHOSPHATE, MONOBASIC AND POTASSIUM PHOSPHATE, DIBASIC 20 MMOL: 224; 236 INJECTION, SOLUTION INTRAVENOUS at 10:19

## 2020-01-01 RX ADMIN — PIPERACILLIN SODIUM AND TAZOBACTAM SODIUM 4.5 G: 4; .5 INJECTION, POWDER, LYOPHILIZED, FOR SOLUTION INTRAVENOUS at 01:09

## 2020-01-01 RX ADMIN — SODIUM CHLORIDE, POTASSIUM CHLORIDE, SODIUM LACTATE AND CALCIUM CHLORIDE: 600; 310; 30; 20 INJECTION, SOLUTION INTRAVENOUS at 13:00

## 2020-01-01 RX ADMIN — IBUPROFEN 600 MG: 600 TABLET ORAL at 16:24

## 2020-01-01 RX ADMIN — OMEPRAZOLE 20 MG: 20 CAPSULE, DELAYED RELEASE ORAL at 07:56

## 2020-01-01 RX ADMIN — MIRTAZAPINE 15 MG: 15 TABLET, FILM COATED ORAL at 21:26

## 2020-01-01 RX ADMIN — OMEPRAZOLE 20 MG: 20 CAPSULE, DELAYED RELEASE ORAL at 07:57

## 2020-01-01 RX ADMIN — POTASSIUM CHLORIDE 20 MEQ: 750 TABLET, EXTENDED RELEASE ORAL at 10:19

## 2020-01-01 RX ADMIN — OMEPRAZOLE 20 MG: 20 CAPSULE, DELAYED RELEASE ORAL at 12:10

## 2020-01-01 RX ADMIN — GABAPENTIN 100 MG: 100 CAPSULE ORAL at 20:00

## 2020-01-01 RX ADMIN — SODIUM BICARBONATE 30 ML/HR: 84 INJECTION, SOLUTION INTRAVENOUS at 10:47

## 2020-01-01 RX ADMIN — SODIUM BICARBONATE 25 MEQ: 84 INJECTION, SOLUTION INTRAVENOUS at 22:30

## 2020-01-01 RX ADMIN — KETOROLAC TROMETHAMINE 15 MG: 30 INJECTION, SOLUTION INTRAMUSCULAR at 18:34

## 2020-01-01 RX ADMIN — METHOCARBAMOL 500 MG: 500 TABLET, FILM COATED ORAL at 00:51

## 2020-01-01 RX ADMIN — ENOXAPARIN SODIUM 40 MG: 40 INJECTION SUBCUTANEOUS at 15:47

## 2020-01-01 RX ADMIN — POTASSIUM CHLORIDE 20 MEQ: 1.5 POWDER, FOR SOLUTION ORAL at 19:36

## 2020-01-01 RX ADMIN — FUROSEMIDE 40 MG: 10 INJECTION, SOLUTION INTRAVENOUS at 12:14

## 2020-01-01 RX ADMIN — SODIUM CHLORIDE 1000 ML: 9 INJECTION, SOLUTION INTRAVENOUS at 12:42

## 2020-01-01 RX ADMIN — MELATONIN TAB 3 MG 3 MG: 3 TAB at 22:40

## 2020-01-01 RX ADMIN — OMEPRAZOLE 20 MG: 20 CAPSULE, DELAYED RELEASE ORAL at 09:06

## 2020-01-01 RX ADMIN — ACETAMINOPHEN 650 MG: 325 TABLET, FILM COATED ORAL at 17:18

## 2020-01-01 RX ADMIN — ACETAMINOPHEN 650 MG: 325 TABLET, FILM COATED ORAL at 14:26

## 2020-01-01 RX ADMIN — MULTIPLE VITAMINS W/ MINERALS TAB 1 TABLET: TAB at 08:46

## 2020-01-01 RX ADMIN — ACETAMINOPHEN 650 MG: 325 TABLET, FILM COATED ORAL at 12:01

## 2020-01-01 RX ADMIN — SODIUM CHLORIDE, SODIUM LACTATE, POTASSIUM CHLORIDE, CALCIUM CHLORIDE AND DEXTROSE MONOHYDRATE 1000 ML: 5; 600; 310; 30; 20 INJECTION, SOLUTION INTRAVENOUS at 11:57

## 2020-01-01 RX ADMIN — OMEPRAZOLE 20 MG: 20 CAPSULE, DELAYED RELEASE ORAL at 08:22

## 2020-01-01 RX ADMIN — METHOCARBAMOL 500 MG: 500 TABLET, FILM COATED ORAL at 20:03

## 2020-01-01 RX ADMIN — VENLAFAXINE HYDROCHLORIDE 37.5 MG: 37.5 CAPSULE, EXTENDED RELEASE ORAL at 08:07

## 2020-01-01 RX ADMIN — ALBUMIN HUMAN 50 G: 0.25 SOLUTION INTRAVENOUS at 03:04

## 2020-01-01 RX ADMIN — ASPIRIN 81 MG CHEWABLE TABLET 81 MG: 81 TABLET CHEWABLE at 07:31

## 2020-01-01 RX ADMIN — MIDODRINE HYDROCHLORIDE 5 MG: 5 TABLET ORAL at 18:03

## 2020-01-01 RX ADMIN — ASPIRIN 81 MG: 81 TABLET, FILM COATED ORAL at 16:31

## 2020-01-01 RX ADMIN — ENOXAPARIN SODIUM 30 MG: 30 INJECTION SUBCUTANEOUS at 08:46

## 2020-01-01 RX ADMIN — AMPICILLIN SODIUM AND SULBACTAM SODIUM 3 G: 2; 1 INJECTION, POWDER, FOR SOLUTION INTRAMUSCULAR; INTRAVENOUS at 08:29

## 2020-01-01 RX ADMIN — Medication 3 MG: at 20:01

## 2020-01-01 RX ADMIN — AMPICILLIN SODIUM AND SULBACTAM SODIUM 3 G: 2; 1 INJECTION, POWDER, FOR SOLUTION INTRAMUSCULAR; INTRAVENOUS at 22:40

## 2020-01-01 RX ADMIN — ACETAMINOPHEN 650 MG: 325 TABLET, FILM COATED ORAL at 03:55

## 2020-01-01 RX ADMIN — MIDODRINE HYDROCHLORIDE 5 MG: 5 TABLET ORAL at 12:26

## 2020-01-01 RX ADMIN — ACETAMINOPHEN 650 MG: 325 TABLET, FILM COATED ORAL at 20:01

## 2020-01-01 RX ADMIN — OMEPRAZOLE 20 MG: 20 CAPSULE, DELAYED RELEASE ORAL at 10:35

## 2020-01-01 RX ADMIN — POTASSIUM CHLORIDE 40 MEQ: 1.5 POWDER, FOR SOLUTION ORAL at 09:20

## 2020-01-01 RX ADMIN — SUCRALFATE 1 G: 1 TABLET ORAL at 18:03

## 2020-01-01 RX ADMIN — POTASSIUM CHLORIDE 40 MEQ: 1.5 POWDER, FOR SOLUTION ORAL at 09:10

## 2020-01-01 RX ADMIN — OXYCODONE HYDROCHLORIDE 5 MG: 5 TABLET ORAL at 06:22

## 2020-01-01 RX ADMIN — GABAPENTIN 100 MG: 100 CAPSULE ORAL at 21:51

## 2020-01-01 RX ADMIN — Medication 0.4 MG: at 14:12

## 2020-01-01 RX ADMIN — VENLAFAXINE HYDROCHLORIDE 37.5 MG: 37.5 CAPSULE, EXTENDED RELEASE ORAL at 09:23

## 2020-01-01 RX ADMIN — OMEPRAZOLE 20 MG: 20 CAPSULE, DELAYED RELEASE ORAL at 11:25

## 2020-01-01 RX ADMIN — ACETAMINOPHEN 650 MG: 325 TABLET, FILM COATED ORAL at 07:55

## 2020-01-01 RX ADMIN — GABAPENTIN 400 MG: 100 CAPSULE ORAL at 20:38

## 2020-01-01 RX ADMIN — OMEPRAZOLE 20 MG: 20 CAPSULE, DELAYED RELEASE ORAL at 16:17

## 2020-01-01 RX ADMIN — POTASSIUM CHLORIDE 20 MEQ: 1.5 POWDER, FOR SOLUTION ORAL at 20:14

## 2020-01-01 RX ADMIN — FOLIC ACID 1 MG: 1 TABLET ORAL at 09:21

## 2020-01-01 RX ADMIN — METHOCARBAMOL 500 MG: 500 TABLET, FILM COATED ORAL at 20:27

## 2020-01-01 RX ADMIN — SODIUM BICARBONATE 50 MEQ: 84 INJECTION, SOLUTION INTRAVENOUS at 01:29

## 2020-01-01 RX ADMIN — ACETAMINOPHEN 650 MG: 325 TABLET, FILM COATED ORAL at 01:56

## 2020-01-01 RX ADMIN — SODIUM CHLORIDE: 9 INJECTION, SOLUTION INTRAVENOUS at 16:25

## 2020-01-01 RX ADMIN — VANCOMYCIN HYDROCHLORIDE 1250 MG: 10 INJECTION, POWDER, LYOPHILIZED, FOR SOLUTION INTRAVENOUS at 21:45

## 2020-01-01 RX ADMIN — PIPERACILLIN SODIUM AND TAZOBACTAM SODIUM 4.5 G: 4; .5 INJECTION, POWDER, LYOPHILIZED, FOR SOLUTION INTRAVENOUS at 13:04

## 2020-01-01 RX ADMIN — DEXTROSE MONOHYDRATE 50 ML: 500 INJECTION PARENTERAL at 09:01

## 2020-01-01 RX ADMIN — ACETAMINOPHEN 650 MG: 325 TABLET, FILM COATED ORAL at 11:13

## 2020-01-01 RX ADMIN — FUROSEMIDE 20 MG: 20 TABLET ORAL at 16:15

## 2020-01-01 RX ADMIN — OXYCODONE HYDROCHLORIDE 5 MG: 5 TABLET ORAL at 12:39

## 2020-01-01 RX ADMIN — ENOXAPARIN SODIUM 40 MG: 40 INJECTION SUBCUTANEOUS at 16:45

## 2020-01-01 RX ADMIN — OMEPRAZOLE 20 MG: 20 CAPSULE, DELAYED RELEASE ORAL at 17:04

## 2020-01-01 RX ADMIN — Medication 0.2 MG: at 16:44

## 2020-01-01 RX ADMIN — VANCOMYCIN HYDROCHLORIDE 1000 MG: 1 INJECTION, SOLUTION INTRAVENOUS at 02:38

## 2020-01-01 RX ADMIN — DEXTROSE 15 G: 15 GEL ORAL at 23:09

## 2020-01-01 RX ADMIN — DOXYCYCLINE 100 MG: 100 INJECTION, POWDER, LYOPHILIZED, FOR SOLUTION INTRAVENOUS at 09:44

## 2020-01-01 RX ADMIN — Medication 1 CAPSULE: at 08:28

## 2020-01-01 RX ADMIN — METHOCARBAMOL 500 MG: 500 TABLET, FILM COATED ORAL at 20:18

## 2020-01-01 RX ADMIN — ONDANSETRON 4 MG: 2 INJECTION INTRAMUSCULAR; INTRAVENOUS at 14:30

## 2020-01-01 RX ADMIN — MIDODRINE HYDROCHLORIDE 5 MG: 5 TABLET ORAL at 12:10

## 2020-01-01 RX ADMIN — MIDODRINE HYDROCHLORIDE 5 MG: 5 TABLET ORAL at 09:24

## 2020-01-01 RX ADMIN — Medication 12.5 MG: at 08:27

## 2020-01-01 RX ADMIN — POTASSIUM PHOSPHATE, MONOBASIC AND POTASSIUM PHOSPHATE, DIBASIC 10 MMOL: 224; 236 INJECTION, SOLUTION INTRAVENOUS at 09:49

## 2020-01-01 RX ADMIN — OMEPRAZOLE 20 MG: 20 CAPSULE, DELAYED RELEASE ORAL at 17:50

## 2020-01-01 RX ADMIN — Medication 1 MG: at 20:33

## 2020-01-01 RX ADMIN — OMEPRAZOLE 20 MG: 20 CAPSULE, DELAYED RELEASE ORAL at 16:03

## 2020-01-01 RX ADMIN — OMEPRAZOLE 20 MG: 20 CAPSULE, DELAYED RELEASE ORAL at 08:07

## 2020-01-01 RX ADMIN — Medication 1 CAPSULE: at 20:15

## 2020-01-01 RX ADMIN — POTASSIUM CHLORIDE 20 MEQ: 750 TABLET, EXTENDED RELEASE ORAL at 11:34

## 2020-01-01 RX ADMIN — MIDODRINE HYDROCHLORIDE 5 MG: 5 TABLET ORAL at 17:41

## 2020-01-01 RX ADMIN — MULTIPLE VITAMINS W/ MINERALS TAB 1 TABLET: TAB at 08:22

## 2020-01-01 RX ADMIN — DEXTROSE 15 G: 15 GEL ORAL at 08:46

## 2020-01-01 RX ADMIN — SODIUM CHLORIDE: 9 INJECTION, SOLUTION INTRAVENOUS at 06:17

## 2020-01-01 RX ADMIN — Medication 5 ML: at 20:39

## 2020-01-01 RX ADMIN — Medication 1 CAPSULE: at 09:04

## 2020-01-01 RX ADMIN — PIPERACILLIN SODIUM AND TAZOBACTAM SODIUM 4.5 G: 4; .5 INJECTION, POWDER, LYOPHILIZED, FOR SOLUTION INTRAVENOUS at 07:54

## 2020-01-01 RX ADMIN — PIPERACILLIN SODIUM AND TAZOBACTAM SODIUM 4.5 G: 4; .5 INJECTION, POWDER, LYOPHILIZED, FOR SOLUTION INTRAVENOUS at 12:48

## 2020-01-01 RX ADMIN — LOPERAMIDE HYDROCHLORIDE 2 MG: 2 CAPSULE ORAL at 20:32

## 2020-01-01 RX ADMIN — GABAPENTIN 400 MG: 100 CAPSULE ORAL at 21:26

## 2020-01-01 RX ADMIN — FUROSEMIDE 40 MG: 40 TABLET ORAL at 14:00

## 2020-01-01 RX ADMIN — HUMAN ALBUMIN MICROSPHERES AND PERFLUTREN 5 ML: 10; .22 INJECTION, SOLUTION INTRAVENOUS at 11:45

## 2020-01-01 RX ADMIN — Medication 3 MG: at 00:51

## 2020-01-01 RX ADMIN — LOPERAMIDE HYDROCHLORIDE 2 MG: 2 CAPSULE ORAL at 09:19

## 2020-01-01 RX ADMIN — ASPIRIN 81 MG: 81 TABLET, FILM COATED ORAL at 08:46

## 2020-01-01 RX ADMIN — ACETAMINOPHEN 650 MG: 325 TABLET, FILM COATED ORAL at 10:19

## 2020-01-01 RX ADMIN — Medication 1 CAPSULE: at 20:27

## 2020-01-01 RX ADMIN — MULTIPLE VITAMINS W/ MINERALS TAB 1 TABLET: TAB at 08:07

## 2020-01-01 RX ADMIN — POTASSIUM CHLORIDE 40 MEQ: 1.5 POWDER, FOR SOLUTION ORAL at 21:13

## 2020-01-01 RX ADMIN — POTASSIUM PHOSPHATE, MONOBASIC AND POTASSIUM PHOSPHATE, DIBASIC 15 MMOL: 224; 236 INJECTION, SOLUTION INTRAVENOUS at 20:04

## 2020-01-01 RX ADMIN — CALCIUM CITRATE 200 MG (950 MG) TABLET 950 MG: at 21:18

## 2020-01-01 RX ADMIN — OMEPRAZOLE 20 MG: 20 CAPSULE, DELAYED RELEASE ORAL at 17:18

## 2020-01-01 RX ADMIN — OMEPRAZOLE 20 MG: 20 CAPSULE, DELAYED RELEASE ORAL at 09:05

## 2020-01-01 RX ADMIN — POTASSIUM CHLORIDE 40 MEQ: 1.5 POWDER, FOR SOLUTION ORAL at 19:59

## 2020-01-01 RX ADMIN — OMEPRAZOLE 20 MG: 20 CAPSULE, DELAYED RELEASE ORAL at 16:25

## 2020-01-01 RX ADMIN — OMEPRAZOLE 20 MG: 20 CAPSULE, DELAYED RELEASE ORAL at 07:52

## 2020-01-01 RX ADMIN — PIPERACILLIN SODIUM AND TAZOBACTAM SODIUM 4.5 G: 4; .5 INJECTION, POWDER, LYOPHILIZED, FOR SOLUTION INTRAVENOUS at 01:29

## 2020-01-01 RX ADMIN — OXYCODONE HYDROCHLORIDE 5 MG: 5 TABLET ORAL at 09:05

## 2020-01-01 RX ADMIN — OMEPRAZOLE 20 MG: 20 CAPSULE, DELAYED RELEASE ORAL at 18:03

## 2020-01-01 RX ADMIN — Medication 0.2 MG: at 08:54

## 2020-01-01 RX ADMIN — Medication 3 MG: at 20:15

## 2020-01-01 RX ADMIN — OXYCODONE HYDROCHLORIDE 5 MG: 5 TABLET ORAL at 20:28

## 2020-01-01 RX ADMIN — POTASSIUM CHLORIDE 20 MEQ: 750 TABLET, EXTENDED RELEASE ORAL at 13:37

## 2020-01-01 RX ADMIN — MIDODRINE HYDROCHLORIDE 5 MG: 5 TABLET ORAL at 17:57

## 2020-01-01 RX ADMIN — OXYCODONE HYDROCHLORIDE 5 MG: 5 TABLET ORAL at 05:22

## 2020-01-01 RX ADMIN — ACETAMINOPHEN 650 MG: 325 TABLET, FILM COATED ORAL at 04:59

## 2020-01-01 RX ADMIN — SODIUM PHOSPHATE, MONOBASIC, MONOHYDRATE AND SODIUM PHOSPHATE, DIBASIC, ANHYDROUS 20 MMOL: 276; 142 INJECTION, SOLUTION INTRAVENOUS at 12:19

## 2020-01-01 RX ADMIN — GABAPENTIN 100 MG: 100 CAPSULE ORAL at 20:29

## 2020-01-01 RX ADMIN — GADOBUTROL 4.5 ML: 604.72 INJECTION INTRAVENOUS at 11:55

## 2020-01-01 RX ADMIN — ACETAMINOPHEN 650 MG: 325 TABLET, FILM COATED ORAL at 19:13

## 2020-01-01 RX ADMIN — VENLAFAXINE HYDROCHLORIDE 37.5 MG: 37.5 CAPSULE, EXTENDED RELEASE ORAL at 08:27

## 2020-01-01 RX ADMIN — FUROSEMIDE 20 MG: 20 TABLET ORAL at 16:41

## 2020-01-01 RX ADMIN — SODIUM CHLORIDE, POTASSIUM CHLORIDE, SODIUM LACTATE AND CALCIUM CHLORIDE 500 ML: 600; 310; 30; 20 INJECTION, SOLUTION INTRAVENOUS at 07:37

## 2020-01-01 RX ADMIN — MELATONIN TAB 3 MG 3 MG: 3 TAB at 01:31

## 2020-01-01 RX ADMIN — Medication 5 ML: at 07:29

## 2020-01-01 RX ADMIN — GABAPENTIN 100 MG: 100 CAPSULE ORAL at 20:15

## 2020-01-01 RX ADMIN — ACETAMINOPHEN 650 MG: 325 TABLET, FILM COATED ORAL at 21:55

## 2020-01-01 RX ADMIN — VENLAFAXINE HYDROCHLORIDE 37.5 MG: 37.5 CAPSULE, EXTENDED RELEASE ORAL at 09:04

## 2020-01-01 RX ADMIN — SUCRALFATE 1 G: 1 TABLET ORAL at 16:03

## 2020-01-01 RX ADMIN — THIAMINE HCL TAB 100 MG 100 MG: 100 TAB at 09:24

## 2020-01-01 RX ADMIN — GABAPENTIN 400 MG: 300 CAPSULE ORAL at 20:27

## 2020-01-01 RX ADMIN — MIDODRINE HYDROCHLORIDE 5 MG: 5 TABLET ORAL at 14:27

## 2020-01-01 RX ADMIN — AMPICILLIN SODIUM AND SULBACTAM SODIUM 3 G: 2; 1 INJECTION, POWDER, FOR SOLUTION INTRAMUSCULAR; INTRAVENOUS at 16:52

## 2020-01-01 RX ADMIN — THIAMINE HCL TAB 100 MG 100 MG: 100 TAB at 08:07

## 2020-01-01 RX ADMIN — FUROSEMIDE 20 MG: 10 INJECTION, SOLUTION INTRAVENOUS at 12:26

## 2020-01-01 RX ADMIN — LOPERAMIDE HYDROCHLORIDE 2 MG: 2 CAPSULE ORAL at 11:05

## 2020-01-01 RX ADMIN — ACETAMINOPHEN 650 MG: 325 TABLET, FILM COATED ORAL at 12:37

## 2020-01-01 RX ADMIN — MIDODRINE HYDROCHLORIDE 5 MG: 5 TABLET ORAL at 13:13

## 2020-01-01 RX ADMIN — MIRTAZAPINE 15 MG: 15 TABLET, FILM COATED ORAL at 21:06

## 2020-01-01 RX ADMIN — OMEPRAZOLE 20 MG: 20 CAPSULE, DELAYED RELEASE ORAL at 20:04

## 2020-01-01 RX ADMIN — DEXTROSE 15 G: 15 GEL ORAL at 16:00

## 2020-01-01 RX ADMIN — OXYCODONE HYDROCHLORIDE 5 MG: 5 TABLET ORAL at 21:05

## 2020-01-01 RX ADMIN — Medication 1 CAPSULE: at 21:18

## 2020-01-01 RX ADMIN — Medication 1 CAPSULE: at 19:36

## 2020-01-01 RX ADMIN — OXYCODONE HYDROCHLORIDE 5 MG: 5 TABLET ORAL at 14:00

## 2020-01-01 RX ADMIN — ENOXAPARIN SODIUM 40 MG: 40 INJECTION SUBCUTANEOUS at 17:25

## 2020-01-01 RX ADMIN — MELATONIN TAB 3 MG 3 MG: 3 TAB at 21:14

## 2020-01-01 RX ADMIN — SUCRALFATE 1 G: 1 TABLET ORAL at 09:11

## 2020-01-01 RX ADMIN — LOPERAMIDE HYDROCHLORIDE 2 MG: 2 CAPSULE ORAL at 08:30

## 2020-01-01 RX ADMIN — Medication 1 CAPSULE: at 20:16

## 2020-01-01 RX ADMIN — DOXEPIN HYDROCHLORIDE 10 MG: 10 CAPSULE ORAL at 20:32

## 2020-01-01 RX ADMIN — ENOXAPARIN SODIUM 30 MG: 30 INJECTION SUBCUTANEOUS at 15:09

## 2020-01-01 RX ADMIN — FUROSEMIDE 20 MG: 10 INJECTION, SOLUTION INTRAVENOUS at 18:02

## 2020-01-01 RX ADMIN — ENOXAPARIN SODIUM 40 MG: 40 INJECTION SUBCUTANEOUS at 16:15

## 2020-01-01 RX ADMIN — ASPIRIN 324 MG: 81 TABLET, CHEWABLE ORAL at 17:27

## 2020-01-01 RX ADMIN — METOPROLOL SUCCINATE 25 MG: 25 TABLET, EXTENDED RELEASE ORAL at 16:31

## 2020-01-01 RX ADMIN — FUROSEMIDE 40 MG: 10 INJECTION, SOLUTION INTRAMUSCULAR; INTRAVENOUS at 00:07

## 2020-01-01 RX ADMIN — OMEPRAZOLE 20 MG: 20 CAPSULE, DELAYED RELEASE ORAL at 07:59

## 2020-01-01 RX ADMIN — THIAMINE HYDROCHLORIDE 100 MG: 100 INJECTION, SOLUTION INTRAMUSCULAR; INTRAVENOUS at 07:39

## 2020-01-01 RX ADMIN — FUROSEMIDE 20 MG: 20 TABLET ORAL at 13:37

## 2020-01-01 RX ADMIN — SODIUM CHLORIDE 1000 ML: 9 INJECTION, SOLUTION INTRAVENOUS at 00:48

## 2020-01-01 RX ADMIN — ENOXAPARIN SODIUM 40 MG: 40 INJECTION SUBCUTANEOUS at 17:41

## 2020-01-01 RX ADMIN — THIAMINE HCL TAB 100 MG 100 MG: 100 TAB at 09:06

## 2020-01-01 RX ADMIN — TIZANIDINE 4 MG: 4 TABLET ORAL at 21:33

## 2020-01-01 RX ADMIN — MIDODRINE HYDROCHLORIDE 5 MG: 5 TABLET ORAL at 09:06

## 2020-01-01 RX ADMIN — ACETAMINOPHEN 650 MG: 325 TABLET, FILM COATED ORAL at 17:17

## 2020-01-01 RX ADMIN — Medication 1 CAPSULE: at 08:08

## 2020-01-01 RX ADMIN — POTASSIUM CHLORIDE 40 MEQ: 750 TABLET, EXTENDED RELEASE ORAL at 13:17

## 2020-01-01 RX ADMIN — OXYCODONE HYDROCHLORIDE 5 MG: 5 TABLET ORAL at 15:07

## 2020-01-01 RX ADMIN — Medication 1 MG: at 21:13

## 2020-01-01 RX ADMIN — MAGNESIUM SULFATE HEPTAHYDRATE 4 G: 40 INJECTION, SOLUTION INTRAVENOUS at 16:15

## 2020-01-01 RX ADMIN — LOPERAMIDE HYDROCHLORIDE 2 MG: 2 CAPSULE ORAL at 22:15

## 2020-01-01 RX ADMIN — VENLAFAXINE HYDROCHLORIDE 37.5 MG: 37.5 CAPSULE, EXTENDED RELEASE ORAL at 09:10

## 2020-01-01 RX ADMIN — KETOROLAC TROMETHAMINE 15 MG: 30 INJECTION, SOLUTION INTRAMUSCULAR at 19:40

## 2020-01-01 RX ADMIN — GABAPENTIN 400 MG: 300 CAPSULE ORAL at 21:05

## 2020-01-01 RX ADMIN — FOLIC ACID 1 MG: 1 TABLET ORAL at 08:08

## 2020-01-01 RX ADMIN — OMEPRAZOLE 20 MG: 20 CAPSULE, DELAYED RELEASE ORAL at 07:31

## 2020-01-01 RX ADMIN — POTASSIUM CHLORIDE 40 MEQ: 1.5 POWDER, FOR SOLUTION ORAL at 20:27

## 2020-01-01 RX ADMIN — THIAMINE HCL TAB 100 MG 100 MG: 100 TAB at 09:21

## 2020-01-01 RX ADMIN — LOPERAMIDE HYDROCHLORIDE 2 MG: 2 CAPSULE ORAL at 12:37

## 2020-01-01 RX ADMIN — OMEPRAZOLE 20 MG: 20 CAPSULE, DELAYED RELEASE ORAL at 12:57

## 2020-01-01 RX ADMIN — Medication 50 MEQ: at 01:29

## 2020-01-01 RX ADMIN — ENOXAPARIN SODIUM 30 MG: 30 INJECTION SUBCUTANEOUS at 07:57

## 2020-01-01 RX ADMIN — POTASSIUM CHLORIDE 40 MEQ: 1.5 POWDER, FOR SOLUTION ORAL at 09:05

## 2020-01-01 RX ADMIN — VASOPRESSIN 2.4 UNITS/HR: 20 INJECTION INTRAVENOUS at 02:54

## 2020-01-01 RX ADMIN — POTASSIUM CHLORIDE 20 MEQ: 1.5 POWDER, FOR SOLUTION ORAL at 21:18

## 2020-01-01 RX ADMIN — FUROSEMIDE 20 MG: 10 INJECTION, SOLUTION INTRAVENOUS at 12:54

## 2020-01-01 RX ADMIN — FUROSEMIDE 20 MG: 10 INJECTION, SOLUTION INTRAVENOUS at 11:32

## 2020-01-01 ASSESSMENT — MIFFLIN-ST. JEOR
SCORE: 917.5
SCORE: 842.89
SCORE: 869.2
SCORE: 858.31
SCORE: 858.31
SCORE: 836.54
SCORE: 910.93
SCORE: 932.5
SCORE: 934.5
SCORE: 890.5
SCORE: 907.76
SCORE: 920
SCORE: 838.5
SCORE: 843.35
SCORE: 873.28

## 2020-01-01 ASSESSMENT — ACTIVITIES OF DAILY LIVING (ADL)
ADLS_ACUITY_SCORE: 21
ADLS_ACUITY_SCORE: 17
ADLS_ACUITY_SCORE: 26
ADLS_ACUITY_SCORE: 23
COGNITION: 0 - NO COGNITION ISSUES REPORTED
ADLS_ACUITY_SCORE: 19
ADLS_ACUITY_SCORE: 17
ADLS_ACUITY_SCORE: 23
ADLS_ACUITY_SCORE: 23
ADLS_ACUITY_SCORE: 26
ADLS_ACUITY_SCORE: 19
ADLS_ACUITY_SCORE: 16
PREVIOUS_RESPONSIBILITIES: MEAL PREP;HOUSEKEEPING;MEDICATION MANAGEMENT;FINANCES
AMBULATION: 1-->ASSISTIVE EQUIPMENT
ADLS_ACUITY_SCORE: 20
ADLS_ACUITY_SCORE: 19
ADLS_ACUITY_SCORE: 17
ADLS_ACUITY_SCORE: 25
ADLS_ACUITY_SCORE: 25
ADLS_ACUITY_SCORE: 20
ADLS_ACUITY_SCORE: 23
RETIRED_COMMUNICATION: 0-->UNDERSTANDS/COMMUNICATES WITHOUT DIFFICULTY
ADLS_ACUITY_SCORE: 23
ADLS_ACUITY_SCORE: 19
ADLS_ACUITY_SCORE: 23
ADLS_ACUITY_SCORE: 26
ADLS_ACUITY_SCORE: 23
ADLS_ACUITY_SCORE: 19
ADLS_ACUITY_SCORE: 24
ADLS_ACUITY_SCORE: 23
ADLS_ACUITY_SCORE: 19
ADLS_ACUITY_SCORE: 26
ADLS_ACUITY_SCORE: 16
ADLS_ACUITY_SCORE: 19
ADLS_ACUITY_SCORE: 23
ADLS_ACUITY_SCORE: 24
ADLS_ACUITY_SCORE: 23
ADLS_ACUITY_SCORE: 20
ADLS_ACUITY_SCORE: 24
DRESS: 0-->INDEPENDENT
ADLS_ACUITY_SCORE: 19
ADLS_ACUITY_SCORE: 23
ADLS_ACUITY_SCORE: 19
ADLS_ACUITY_SCORE: 24
ADLS_ACUITY_SCORE: 17
ADLS_ACUITY_SCORE: 19
FALL_HISTORY_WITHIN_LAST_SIX_MONTHS: YES
ADLS_ACUITY_SCORE: 23
ADLS_ACUITY_SCORE: 25
ADLS_ACUITY_SCORE: 16
ADLS_ACUITY_SCORE: 24
ADLS_ACUITY_SCORE: 25
ADLS_ACUITY_SCORE: 17
TOILETING: 0-->INDEPENDENT
TRANSFERRING: 1-->ASSISTIVE EQUIPMENT
BATHING: 1-->ASSISTIVE EQUIPMENT
RETIRED_EATING: 0-->INDEPENDENT
ADLS_ACUITY_SCORE: 17
ADLS_ACUITY_SCORE: 19
ADLS_ACUITY_SCORE: 25
ADLS_ACUITY_SCORE: 19
ADLS_ACUITY_SCORE: 19
ADLS_ACUITY_SCORE: 17
ADLS_ACUITY_SCORE: 19
ADLS_ACUITY_SCORE: 17
ADLS_ACUITY_SCORE: 19
ADLS_ACUITY_SCORE: 24
ADLS_ACUITY_SCORE: 19
ADLS_ACUITY_SCORE: 17
ADLS_ACUITY_SCORE: 20
NUMBER_OF_TIMES_PATIENT_HAS_FALLEN_WITHIN_LAST_SIX_MONTHS: 1
ADLS_ACUITY_SCORE: 18
ADLS_ACUITY_SCORE: 23
ADLS_ACUITY_SCORE: 24
PREVIOUS_RESPONSIBILITIES: MEAL PREP;HOUSEKEEPING;LAUNDRY;MEDICATION MANAGEMENT;DRIVING
ADLS_ACUITY_SCORE: 19
ADLS_ACUITY_SCORE: 21
ADLS_ACUITY_SCORE: 23
ADLS_ACUITY_SCORE: 25
ADLS_ACUITY_SCORE: 18
ADLS_ACUITY_SCORE: 17
ADLS_ACUITY_SCORE: 25
ADLS_ACUITY_SCORE: 19
ADLS_ACUITY_SCORE: 17
ADLS_ACUITY_SCORE: 17
ADLS_ACUITY_SCORE: 19
ADLS_ACUITY_SCORE: 21
ADLS_ACUITY_SCORE: 17
ADLS_ACUITY_SCORE: 24
SWALLOWING: 0-->SWALLOWS FOODS/LIQUIDS WITHOUT DIFFICULTY
ADLS_ACUITY_SCORE: 25
ADLS_ACUITY_SCORE: 19
ADLS_ACUITY_SCORE: 27
ADLS_ACUITY_SCORE: 17
ADLS_ACUITY_SCORE: 19

## 2020-01-01 ASSESSMENT — ENCOUNTER SYMPTOMS
PSYCHIATRIC NEGATIVE: 1
NECK PAIN: 1
SHORTNESS OF BREATH: 1
SHORTNESS OF BREATH: 1
WEAKNESS: 1
ABDOMINAL PAIN: 0
APPETITE CHANGE: 1
WEAKNESS: 1
ABDOMINAL PAIN: 0
FEVER: 0
DIARRHEA: 1
ACTIVITY CHANGE: 1
FATIGUE: 1
FEVER: 0
COUGH: 0
APPETITE CHANGE: 1
FATIGUE: 1
VOMITING: 0
NAUSEA: 0

## 2020-01-01 ASSESSMENT — PAIN DESCRIPTION - DESCRIPTORS
DESCRIPTORS: ACHING
DESCRIPTORS: HEADACHE
DESCRIPTORS: ACHING
DESCRIPTORS: HEADACHE

## 2020-08-01 PROBLEM — E87.6 HYPOKALEMIA: Status: ACTIVE | Noted: 2020-01-01

## 2020-08-01 NOTE — H&P
[facility], [place]    History and Physical - Hospitalist Service, Gold Night       Date of Admission:  8/1/2020     ASSESSMENT & PLAN:     Minerva Blanco is a 75 year old female admitted on 8/1/2020. She has a history of breast cancer, CAD s/p PCI (4/2018), PAF, CHF, HTN, HLD, MS, fibromyalgia, IBS, GERD, esophageal perforation, and malnutrition who presents with progressive generalized weakness x 3-4 weeks, culminating in fall at home today without significant injury.  Noted to be hypokalemic on evaluation (K 2.5).  Admitted for further evaluation and management.     # Hypokalemia:  Suspect secondary to loop diuretics and low dietary K.  No EKG findings.  Replaced with KCl 40 mEq PO x 1 in the ED, then started on replacement protocol.  Repeat K pending.  - Continue K replacement protocol  - Holding diuretics    # Progressive generalized weakness with fall:  Suffered unwitnessed fall at home with closed head injury but no LOC.  CT head and neck negative.  Etiology of weakness unclear.  Onset 3-4 weeks ago with steady progression since then.  No focal weakness or paresthesias.  Suspect malnutrition, volume depletion, and deconditioning contributing.  Cannot r/o nutritional deficiencies.  Symptoms not c/w prior MS exacerbations per patient.  TSH normal.  Patient notes increased OSORIO in past 2-3 days.  ? CHF, valvular disease or pHTN contributing to exercise intolerance.  No recent echo on file.  No focal neurologic deficits on exam.  CT head negative for hemorrhage or infarct.  Afebrile.  WBC normal.  No infectious symptoms.  UA and CXR negative.  No sick contacts or COVID exposures.  - PT/OT consults  - Nutrition consult  - Check vitamin B1, B12, and D levels  - Check BNP and echo  - Monitor on telemetry  - COVID-19 PCR pending  - LOW SUSPICION PUI.  Follow these instructions:    If COVID test positive -> continue isolation precautions    If COVID test negative -> discontinue  COVID-specific isolation precautions    # Abnormal LFTs:  Etiology unclear.  Obstructive pattern - T.bili 1.9, Alk Phos 408, ALT 65, AST 76.  Abdominal exam benign.  Tylenol <2.  No other suspicious meds.    - US abdomen  - Repeat LFTs in AM  - Avoid hepatotoxic agents    # Hypoglycemia:  Suspect d/t poor PO intake.  Glucose 50's on arrival.  Improved to 90's.  - Monitor    # Chronic macrocytic anemia:  Baseline Hgb ~11.  No evidence of recent or active bleeding.  Head CT negative for hemorrhage.    - Repeat CBC in AM    # Hx CAD, HFpEF, and PAF:  Hx inferior MI s/p PCI and stenting of RCA x 2 in 2018.  No recent or active chest pain.  Troponin slightly elevated at 0.062.  EKG negative for ischemic changes.  ? Reports of worsening dyspnea on exertion 2/2 deconditioning vs decompensated CHF.  Currently in NSR.  Not on anticoagulation for a-fib.  - Add on BNP  - Check echo  - Monitor on telemetry  - Continue ASA 81mg daily and metoprolol XL 25mg daily  - Hold lasix for now given recent falls and hypokalemia  - Daily weights    # Hx Multiple sclerosis:  Has been quiescent for years.  Reports current symptoms not c/w prior MS flares.    - Consider neurology consultation if above work up negative and symptoms persist    # Hx Fibromyalgia:  Patient reports chronic pain in neck, shoulders, hips and knees.  ? Possible PMR.  Could consider rheumatology consult.        Diet: Regular  DVT Prophylaxis: Pneumatic Compression Devices  Whitt Catheter: not present  Code Status: DNR/DNI         Disposition Plan   Expected discharge: 2 - 3 days, recommended to TBD once evaluation complete and therapy consults completed.  Entered: Thad Mackenzie PA-C 08/01/2020, 2:08 PM     The patient's care was discussed with the Attending Physician, Dr. Zambrano.    Thad Mackenzie PA-C  [facility], [place]  Pager: [phone]  Please see sticky note for cross cover  information  ______________________________________________________________________    Chief Complaint   Generalized weakness; Fall at home.    History is obtained from the patient    History of Present Illness   Minerva Blanco is a 75 year old female with below medical history who presents with 3-4 weeks of progressive generalized weakness and fatigue.  Patient notes poor oral intake and hydration during that time.  Her appetite has been poor.  Her mobility has been limited and therefore she feels like she is getting weaker and weaker.  For the past 3-4 days she notes significant dyspnea on exertion and fatigue, which has limited her regular activities and ADLs.  She denies any dizziness, palpitations, chest pain, or LE edema.  She denies any recent changes in medications.  No other new symptoms.  Today around 0830 she had a fall at home after developing severe weakness while standing and then her legs buckled.  She hit the back of her head on the floor.  She denies syncope or LOC.  She twisted her left ankle in the process and reports worsening of her chronic neck pain following the fall.  The paramedics were called and she was brought to the ED for further evaluation.     ED Course:  Afebrile.  Vitals stable.  No hypoxia.  K 2.5.  Glucose 52.  Electrolytes and renal function otherwise normal.  LFTs elevated.  CT head and C spine negative.  CXR negative.  UA negative.  K replaced.  Admitted to medicine for further evaluation.    Review of Systems    CONSTITUTIONAL:  No fevers, chills, or night sweats.  + Weight loss.  HEENT:  No headache.  No vision changes.    RESPIRATORY:  Chronic cough which she attributes to reflux following esophageal surgery, stable.  No dyspnea.  CARDIOVASCULAR:  No chest pain, palpitations, orthopnea, LE swelling.  GASTROINTESTINAL:  No nausea, vomiting or abdominal pain.  + Chronic diarrhea which is unchanged.  No melena or hematochezia.  GENITOURINARY:  No dysuria, urinary urgency or  frequency  INTEGUMENT/BREAST:  No rash  MUSCULOSKELETAL:  Chronic neck, should, hip and lower extremity pain.  Denies arthralgias.  Neck and shoulder pain worse since fall.  Also notes mild L ankle pain.  No swelling.    NEUROLOGICAL:  No focal neurologic deficits  BEHAVIOR/PSYCH:  No worsening depression     Past Medical History    I have reviewed this patient's medical history and updated it with pertinent information if needed.   Past Medical History:   Diagnosis Date     Breast cancer (H) 2007    right side - lumpectomy, chemo, and local radiation     Chronic infection     infection/wound for two years after esoph surgery     Compression fracture of spine (H)     T12-L1     Coronary artery disease 04/2018    s/p PCI with ADOLFO to proximal and mid RCA     Esophageal perforation      Fibromyalgia      GERD (gastroesophageal reflux disease)      Hiatal hernia      IBS (irritable bowel syndrome)      Meniere's disease     deaf left ear     Multiple sclerosis (H)      Noninfectious ileitis      Other chronic pain      Paroxysmal atrial fibrillation (H)        Past Surgical History   I have reviewed this patient's surgical history and updated it with pertinent information if needed.  Past Surgical History:   Procedure Laterality Date     APPENDECTOMY       BACK SURGERY  5/1/2015    lumbar fusion     BRONCHOSCOPY FLEXIBLE N/A 4/17/2017    Procedure: BRONCHOSCOPY FLEXIBLE;;  Surgeon: Jens Wise MD;  [facility]     C GASTROSTOMY/JEJUN TUBE      J tube for feedings     CLOSE SPIT FISTULA N/A 4/17/2017    Procedure: CLOSE SPIT FISTULA;;  Surgeon: Jens Wise MD;  [facility]     COMBINED ESOPHAGOSCOPY, GASTROSCOPY, DUODENOSCOPY (EGD), REMOVE ESOPHAGEAL STENT N/A 8/26/2016    Procedure: COMBINED ESOPHAGOSCOPY, GASTROSCOPY, DUODENOSCOPY (EGD), REMOVE ESOPHAGEAL STENT;  Surgeon: Jens Wise MD;  [facility]     COMBINED ESOPHAGOSCOPY, GASTROSCOPY, DUODENOSCOPY (EGD),  REMOVE ESOPHAGEAL STENT N/A 2/12/2018    Procedure: COMBINED ESOPHAGOSCOPY, GASTROSCOPY, DUODENOSCOPY (EGD), REMOVE ESOPHAGEAL STENT;  Esophagogastroduodenoscopy With Stent Removal;  Surgeon: Jens Wise MD;  [facility]     COMBINED ESOPHAGOSCOPY, GASTROSCOPY, DUODENOSCOPY (EGD), REPLACE ESOPHAGEAL STENT N/A 8/25/2017    Procedure: COMBINED ESOPHAGOSCOPY, GASTROSCOPY, DUODENOSCOPY (EGD), REPLACE ESOPHAGEAL STENT;;  Surgeon: Jens Wise MD;  [facility]     COMBINED ESOPHAGOSCOPY, GASTROSCOPY, DUODENOSCOPY (EGD), REPLACE ESOPHAGEAL STENT N/A 9/15/2017    Procedure: COMBINED ESOPHAGOSCOPY, GASTROSCOPY, DUODENOSCOPY (EGD), REPLACE ESOPHAGEAL STENT;  Upper Endoscopy with Stent Exchange, Cauterization of Tracheosophageal Fistula, Cauterization of Chest Wound   ;  Surgeon: Jens Wise MD;  [facility]     COMBINED ESOPHAGOSCOPY, GASTROSCOPY, DUODENOSCOPY (EGD), REPLACE ESOPHAGEAL STENT N/A 10/20/2017    Procedure: COMBINED ESOPHAGOSCOPY, GASTROSCOPY, DUODENOSCOPY (EGD), REPLACE ESOPHAGEAL STENT;  Upper Endoscopy with Stent Exchange, Cauterization Tracheosphageal Fistula Tract;  Surgeon: Nalini Barreto MD;  [facility]     COMBINED ESOPHAGOSCOPY, GASTROSCOPY, DUODENOSCOPY (EGD), REPLACE ESOPHAGEAL STENT N/A 11/3/2017    Procedure: COMBINED ESOPHAGOSCOPY, GASTROSCOPY, DUODENOSCOPY (EGD), REPLACE ESOPHAGEAL STENT;  Esophagogastroduodenoscopy, Stent Revision, Cauterization Of Traceoesophageal Fistula and stent exchange;  Surgeon: Nalini Barreto MD;  [facility]     COMBINED ESOPHAGOSCOPY, GASTROSCOPY, DUODENOSCOPY (EGD), REPLACE ESOPHAGEAL STENT N/A 11/17/2017    Procedure: COMBINED ESOPHAGOSCOPY, GASTROSCOPY, DUODENOSCOPY (EGD), REPLACE ESOPHAGEAL STENT;  Esophagogastroduodenoscopy, Esophogeal Stent Removal, Esophogeal Stent Placement (23x100 EndoMAXX), Cauterization Of Fistula Tract;  Surgeon: Nalini Barreto MD;  [facility]     CREATE SPIT FISTULA N/A  1/9/2017    Procedure: CREATE SPIT FISTULA;  Surgeon: Jens Wise MD;  [facility]     ESOPHAGECTOMY N/A 1/9/2017    Procedure: ESOPHAGECTOMY;  Surgeon: Jens Wise MD;  [facility]     ESOPHAGOSCOPY, GASTROSCOPY, DUODENOSCOPY (EGD), COMBINED N/A 4/18/2016    Procedure: COMBINED ESOPHAGOSCOPY, GASTROSCOPY, DUODENOSCOPY (EGD);  Surgeon: Alexis Barraza MD;  [facility]     ESOPHAGOSCOPY, GASTROSCOPY, DUODENOSCOPY (EGD), COMBINED N/A 4/25/2016    Procedure: COMBINED ESOPHAGOSCOPY, GASTROSCOPY, DUODENOSCOPY (EGD);  Surgeon: Alexis Barraza MD;  [facility]     ESOPHAGOSCOPY, GASTROSCOPY, DUODENOSCOPY (EGD), COMBINED N/A 5/4/2016    Procedure: COMBINED ESOPHAGOSCOPY, GASTROSCOPY, DUODENOSCOPY (EGD);  Surgeon: Alexis Barraza MD;  [facility]     ESOPHAGOSCOPY, GASTROSCOPY, DUODENOSCOPY (EGD), COMBINED N/A 5/18/2016    Procedure: COMBINED ESOPHAGOSCOPY, GASTROSCOPY, DUODENOSCOPY (EGD);  Surgeon: Alexis Barraza MD;  [facility]     ESOPHAGOSCOPY, GASTROSCOPY, DUODENOSCOPY (EGD), COMBINED N/A 6/22/2016    Procedure: COMBINED ESOPHAGOSCOPY, GASTROSCOPY, DUODENOSCOPY (EGD);  Surgeon: Alexis Barraza MD;  [facility]     ESOPHAGOSCOPY, GASTROSCOPY, DUODENOSCOPY (EGD), COMBINED N/A 7/12/2016    Procedure: COMBINED ESOPHAGOSCOPY, GASTROSCOPY, DUODENOSCOPY (EGD);  Surgeon: Jens Wise MD;  [facility]     ESOPHAGOSCOPY, GASTROSCOPY, DUODENOSCOPY (EGD), COMBINED N/A 4/21/2017    Procedure: COMBINED ESOPHAGOSCOPY, GASTROSCOPY, DUODENOSCOPY (EGD);  Esophagogastroduodenoscopy, Pharyngostomy Tube Placement ;  Surgeon: Jens Wise MD;  [facility]     ESOPHAGOSCOPY, GASTROSCOPY, DUODENOSCOPY (EGD), COMBINED N/A 5/19/2017    Procedure: COMBINED ESOPHAGOSCOPY, GASTROSCOPY, DUODENOSCOPY (EGD);  Upper Endoscopy, Irrigation and Debridement of Chest Wound ;  Surgeon: Nalini Barreto MD;   [facility]     ESOPHAGOSCOPY, GASTROSCOPY, DUODENOSCOPY (EGD), COMBINED N/A 5/23/2017    Procedure: COMBINED ESOPHAGOSCOPY, GASTROSCOPY, DUODENOSCOPY (EGD);;  Surgeon: Nalini Barreto MD;  [facility]     ESOPHAGOSCOPY, GASTROSCOPY, DUODENOSCOPY (EGD), COMBINED N/A 6/27/2017    Procedure: COMBINED ESOPHAGOSCOPY, GASTROSCOPY, DUODENOSCOPY (EGD);  Esophagogastroduodenoscopy With Removal And Replacement Of Esophageal Stent exchange. Exchange of pharyngtomy tube. Chest wound dressing change;  Surgeon: Nalini Barreto MD;  [facility]     ESOPHAGOSCOPY, GASTROSCOPY, DUODENOSCOPY (EGD), COMBINED N/A 8/4/2017    Procedure: COMBINED ESOPHAGOSCOPY, GASTROSCOPY, DUODENOSCOPY (EGD);  Esophagogastroduodenoscopy, Stent Removal and Stent Replacement. Dressing Change ;  Surgeon: Nalini Barreto MD;  [facility]     ESOPHAGOSCOPY, GASTROSCOPY, DUODENOSCOPY (EGD), COMBINED N/A 8/25/2017    Procedure: COMBINED ESOPHAGOSCOPY, GASTROSCOPY, DUODENOSCOPY (EGD);  Esophagogastroduodenoscopy,  Stent Revision,  Cauterization;  Surgeon: Jens Wise MD;  [facility]     ESOPHAGOSCOPY, GASTROSCOPY, DUODENOSCOPY (EGD), COMBINED N/A 10/2/2017    Procedure: COMBINED ESOPHAGOSCOPY, GASTROSCOPY, DUODENOSCOPY (EGD);  Esophagogastroduodenostomy, Replacement of Esophageal Stent, Cauterization of Tracheosophageal Fistula anc chest wall,   C-arm;  Surgeon: Nalini Barreto MD;  [facility]     ESOPHAGOSCOPY, GASTROSCOPY, DUODENOSCOPY (EGD), DILATATION, COMBINED N/A 6/29/2016    Procedure: COMBINED ESOPHAGOSCOPY, GASTROSCOPY, DUODENOSCOPY (EGD), DILATATION;  Surgeon: Jens Wise MD;  [facility]     EXPLORE NECK N/A 5/19/2017    Procedure: EXPLORE NECK;;  Surgeon: Nalini Barreto MD;  [facility]     HC UGI ENDOSCOPY W TRANSENDOSCOPIC STENT PLACEMENT N/A 7/12/2016    Procedure: COMBINED ESOPHAGOSCOPY, GASTROSCOPY, DUODENOSCOPY (EGD), PLACE TRANSENDOSCOPIC ESOPHAGEAL STENT;  Surgeon: Frandy  Jens Saul MD;  [facility]     HC UGI ENDOSCOPY W TRANSENDOSCOPIC STENT PLACEMENT N/A 7/22/2016    Procedure: COMBINED ESOPHAGOSCOPY, GASTROSCOPY, DUODENOSCOPY (EGD), PLACE TRANSENDOSCOPIC ESOPHAGEAL STENT;  Surgeon: Jens Wise MD;  [facility]     INCISION AND DRAINAGE CHEST WASHOUT, COMBINED N/A 5/27/2017    Procedure: COMBINED INCISION AND DRAINAGE CHEST WASHOUT;  Chest washout;  Surgeon: Nalini Barreto MD;  [facility]     INCISION AND DRAINAGE CHEST WASHOUT, COMBINED N/A 5/28/2017    Procedure: COMBINED INCISION AND DRAINAGE CHEST WASHOUT;  Chest washout;  Surgeon: Nalini Barreto MD;  [facility]     INCISION AND DRAINAGE CHEST WASHOUT, COMBINED N/A 6/9/2017    Procedure: COMBINED INCISION AND DRAINAGE CHEST WASHOUT;  Chest washout and dressing change, Esophaogastroduodenoscopy;  Surgeon: Jens Wise MD;  [facility]     INCISION AND DRAINAGE CHEST WASHOUT, COMBINED N/A 7/17/2017    Procedure: COMBINED INCISION AND DRAINAGE CHEST WASHOUT;  Incision and Drainage of right Chest Wound, Esophagogastroduodenoscopy with stent exchange. pharangostomy tube revision;  Surgeon: Jens Wise MD;  [facility]     IRRIGATION AND DEBRIDEMENT CHEST WASHOUT, COMBINED N/A 6/29/2016    Procedure: COMBINED IRRIGATION AND DEBRIDEMENT CHEST WASHOUT;  Surgeon: Jens Wise MD;  [facility]     IRRIGATION AND DEBRIDEMENT CHEST WASHOUT, COMBINED N/A 5/23/2017    Procedure: COMBINED IRRIGATION AND DEBRIDEMENT CHEST WASHOUT;  COMBINED IRRIGATION AND DEBRIDEMENT CHEST WASHOUT,COMBINED ESOPHAGOSCOPY, GASTROSCOPY, DUODENOSCOPY (EGD) ;  Surgeon: Nalini Barreto MD;  [facility]     IRRIGATION AND DEBRIDEMENT CHEST WASHOUT, COMBINED N/A 5/24/2017    Procedure: COMBINED IRRIGATION AND DEBRIDEMENT CHEST WASHOUT;  Chest wound Washout and Dressing Change;  Surgeon: Nalini Barreto MD;  [facility]     IRRIGATION AND DEBRIDEMENT CHEST WASHOUT,  COMBINED N/A 5/25/2017    Procedure: COMBINED IRRIGATION AND DEBRIDEMENT CHEST WASHOUT;  Irrigation and Debridement Chest Washout and dressing change;  Surgeon: Nalini Barreto MD;  [facility]     IRRIGATION AND DEBRIDEMENT CHEST WASHOUT, COMBINED N/A 5/26/2017    Procedure: COMBINED IRRIGATION AND DEBRIDEMENT CHEST WASHOUT;  Irrigation and Debridement Chest Washout with  dressing change;  Surgeon: Nalini Barreto MD;  [facility]     IRRIGATION AND DEBRIDEMENT CHEST WASHOUT, COMBINED N/A 5/30/2017    Procedure: COMBINED IRRIGATION AND DEBRIDEMENT CHEST WASHOUT;  Irrigation and Debridement Chest Washout , PICC line dressing change;  Surgeon: Nalini Barreto MD;  [facility]     IRRIGATION AND DEBRIDEMENT CHEST WASHOUT, COMBINED N/A 5/31/2017    Procedure: COMBINED IRRIGATION AND DEBRIDEMENT CHEST WASHOUT;  Irrigation and Debridement Chest Washout ;  Surgeon: Nalini Barreto MD;  [facility]     IRRIGATION AND DEBRIDEMENT CHEST WASHOUT, COMBINED N/A 6/1/2017    Procedure: COMBINED IRRIGATION AND DEBRIDEMENT CHEST WASHOUT;  Chest Wound Irrigation And Debridement with dressing change ;  Surgeon: Nalini Barreto MD;  [facility]     IRRIGATION AND DEBRIDEMENT CHEST WASHOUT, COMBINED N/A 6/4/2017    Procedure: COMBINED IRRIGATION AND DEBRIDEMENT CHEST WASHOUT;  COMBINED IRRIGATION AND DEBRIDEMENT CHEST WASHOUT, Esophagoscopy, Gastroscopy, Duodenoscopy (EGD) place transendoscopic esophageal stent,   Pharyngostomy and chest wall tube exchange  C-Arm;  Surgeon: Jens Wise MD;  [facility]     IRRIGATION AND DEBRIDEMENT CHEST WASHOUT, COMBINED N/A 6/2/2017    Procedure: COMBINED IRRIGATION AND DEBRIDEMENT CHEST WASHOUT;  Chest Wound Irrigation and Debridement, Dressing Change;  Surgeon: Nalini Barreto MD;  [facility]     LAPAROSCOPIC ASSISTED INSERTION TUBE JEJUNOSTOMY N/A 4/17/2017    Procedure: LAPAROSCOPIC ASSISTED INSERTION TUBE JEJUNOSTOMY;;  Surgeon: Jens Wise  MD Kg;  [facility]     LAPAROSCOPY DIAGNOSTIC (GENERAL) N/A 1/9/2017    Procedure: LAPAROSCOPY DIAGNOSTIC (GENERAL);  Surgeon: Jens Wise MD;  [facility]     LAPAROSCOPY DIAGNOSTIC (GENERAL) N/A 4/17/2017    Procedure: LAPAROSCOPY DIAGNOSTIC (GENERAL);  Laparoscopic , Neck Dissection, Spit Fistula Takedown, Laparoscopic Jejunostomy Tube and Pharyngostomy Tube, Gastric Pull up, Upper Endoscopy(EGD)  , Flexible Bronchoscopy ,Sternotomy ;  Surgeon: Jens Wise MD;  [facility]     LUMPECTOMY BREAST Right 2007     NERVE BLOCK PERIPHERAL N/A 8/30/2016    Procedure: NERVE BLOCK PERIPHERAL;  Surgeon: GENERIC ANESTHESIA PROVIDER;  [facility]     NISSEN FUNDOPLICATION  1/2016     PHARYNGOSTOMY N/A 4/18/2016    Procedure: PHARYNGOSTOMY;  Surgeon: Alexis Barraza MD;  [facility]     PHARYNGOSTOMY N/A 4/25/2016    Procedure: PHARYNGOSTOMY;  Surgeon: Alexis Barraza MD;  [facility]     PHARYNGOSTOMY N/A 5/4/2016    Procedure: PHARYNGOSTOMY;  Surgeon: Alexis Barraza MD;  [facility]     PHARYNGOSTOMY N/A 6/22/2016    Procedure: PHARYNGOSTOMY;  Surgeon: Alexis Barraza MD;  [facility]     PHARYNGOSTOMY N/A 6/29/2016    Procedure: PHARYNGOSTOMY;  Surgeon: Jens Wise MD;  [facility]     PHARYNGOSTOMY N/A 4/21/2017    Procedure: PHARYNGOSTOMY;;  Surgeon: Jens Wise MD;  [facility]     PHARYNGOSTOMY Left 5/31/2017    Procedure: PHARYNGOSTOMY;;  Surgeon: Nalini Barreto MD;  [facility]     PICC INSERTION Left 8/25/2016    5fr DL BioFlo PICC, 42cm (3cm external) in the L medial brachial vein w/ tip in the SVC RA junction.     PICC INSERTION - Rewire Right 05/31/2017    5fr DL BioFlo PICC, 40cm (6cm external) in the R medial brachial vein w/ tip in the SVC RA junction.     REPAIR FISTULA TRACHEOESOPHAGEAL N/A 9/15/2017    Procedure: REPAIR FISTULA TRACHEOESOPHAGEAL;;  Surgeon:  "Jens Wise MD;  [facility]     THORACOTOMY Right     lung infection - \"hard crust formed on lung\"     THORACOTOMY Left 2017    Procedure: THORACOTOMY;  Surgeon: Jens Wise MD;  [facility]     WRIST SURGERY         Social History   I have reviewed this patient's social history and updated it with pertinent information if needed.      Family History   I have reviewed this patient's family history and updated it with pertinent information if needed.  Family History   Problem Relation Age of Onset     Alzheimer Disease Mother      Cerebrovascular Disease Father         cerebral hemorrhage     Ovarian Cancer Sister      Breast Cancer Daughter        Prior to Admission Medications   Prior to Admission Medications   Prescriptions Last Dose Informant Patient Reported? Taking?   Acetaminophen (TYLENOL EXTRA STRENGTH PO) 2020 at Unknown time  Yes Yes   Sig: Take 1,000 mg by mouth 3 times daily And 500 mg by mouth 2 times daily as needed   Calcium Carb-Cholecalciferol (OS-ANNABELLE PO) Past Month at Unknown time  Yes Yes   Sig: Take 1 tablet by mouth 3 times daily (with meals)   DULoxetine (CYMBALTA) 30 MG capsule Past Month at Unknown time  Yes Yes   Sig: Take 30 mg by mouth daily   HYDROmorphone (DILAUDID) 2 MG tablet   Yes No   Sig: Take 2 mg by mouth 2 times daily as needed for severe pain    MELATONIN PO 2020 at Unknown time  Yes Yes   Sig: Take 10 mg by mouth At Bedtime    Nutritional Supplements (NUTRITIONAL SUPPLEMENT PO) 2020 at Unknown time  Yes Yes   Sig: Take by mouth At Bedtime Snack at  hs.   OTHER MEDICAL SUPPLIES Unknown at Unknown time  Yes No   Sig: every morning Daily weights.   OTHER MEDICAL SUPPLIES Unknown at Unknown time  Yes No   Si times daily BP checks qid.   OTHER MEDICAL SUPPLIES Unknown at Unknown time  Yes No   Sig: Legs: Black socks and Compriflex LEs. Thigh high wraps. Leave on at all times. If soiled may remove thigh portions.  "   every shift   TRAMADOL HCL PO Unknown at Unknown time  Yes No   Sig: Take 25 mg by mouth 4 times daily as needed for moderate to severe pain   TRAZODONE HCL PO Unknown at Unknown time  Yes No   Sig: Take 150 mg by mouth At Bedtime    aspirin 81 MG EC tablet 2020 at Unknown time  Yes Yes   Sig: Take 81 mg by mouth daily   atorvastatin (LIPITOR) 80 MG tablet Unknown at Unknown time  Yes No   Sig: Take 80 mg by mouth At Bedtime    benzonatate (TESSALON) 100 MG capsule Unknown at Unknown time  Yes No   Sig: Take 100 mg by mouth 3 times daily as needed for cough   ferrous sulfate (FEROSUL) 325 (65 Fe) MG tablet Past Month at Unknown time  Yes Yes   Sig: Take 325 mg by mouth daily (with breakfast)   furosemide (LASIX) 20 MG tablet 2020 at Unknown time  Yes Yes   Sig: Take 40 mg by mouth 2 times daily    gabapentin (NEURONTIN) 400 MG capsule 2020 at Unknown time  Yes Yes   Sig: Take 400 mg by mouth 2 times daily   lidocaine (LIDODERM) 5 % patch Unknown at Unknown time  Yes No   Sig: Apply to painful area topically 2 times a day - 12 hours on and 12 hours off   loperamide (IMODIUM A-D) 2 MG tablet Unknown at Unknown time  Yes No   Sig: Take 2 mg by mouth daily as needed Give 4mg with first loose stool AND Give 2 mg by mouth as needed for Loose Stools 2 mg with each subsequent loose stool episode after initial 4 mg first dose. NOT TO EXCEED 16 MG IN 24   magnesium oxide 400 MG CAPS 2020 at Unknown time  Yes Yes   Sig: Take 400 mg by mouth 2 times daily   medical cannabis (Patient's own supply.  Not a prescription) 2020 at Unknown time  Yes Yes   Si Units Patient report she takes at home.  Not taking at U   metoprolol succinate (TOPROL-XL) 50 MG 24 hr tablet 2020 at Unknown time  Yes Yes   Sig: Take 25 mg by mouth daily    multivitamin w/minerals (THERA-VIT-M) tablet 2020 at Unknown time  Yes Yes   Sig: Take 1 tablet by mouth daily   polyethylene glycol (MIRALAX/GLYCOLAX) packet  7/31/2020 at Unknown time  Yes Yes   Sig: Take 1 packet by mouth daily as needed for constipation   potassium chloride ER (K-DUR/KLOR-CON M) 20 MEQ CR tablet 7/31/2020 at Unknown time  Yes Yes   Sig: Take 40 mEq by mouth 2 times daily    ranitidine (ZANTAC) 75 MG tablet 7/31/2020 at Unknown time  Yes Yes   Sig: Take 75 mg by mouth 2 times daily Before lunch and at bedtime   senna-docusate (SENOKOT-S/PERICOLACE) 8.6-50 MG tablet Past Month at Unknown time  Yes Yes   Sig: Take 1 tablet by mouth 2 times daily      Facility-Administered Medications: None     Multiple mistakes per patient.  Will need formal med rec from pharmacist.      Allergies   Allergies   Allergen Reactions     Amoxicillin Diarrhea     Ativan [Lorazepam] Other (See Comments)     Hallucinations     Morphine Itching       Physical Exam   Vital Signs: Temp: 97.9  F (36.6  C) Temp src: Oral BP: 120/81 Pulse: 89 Heart Rate: 85 Resp: 28 SpO2: 97 % O2 Device: None (Room air)    Weight: 93 lbs 0 oz    General:  Appears stated age. Awake. Alert. NAD. Appears comfortable.    HEENT:  No scleral icterus. Mucous membranes moist.   Cardiovascular:  RRR. S1, S2. No murmur. No JVD. Pedal pulses 2+ bilaterally.  Respiratory:  CTAB. No wheezing, rhonchi or rales.  Gastrointestinal:  Abdomen soft, non-distended. Active bowel sounds. No tenderness, guarding, or rebound.   Neurological:  Grossly non-focal. EOMI. Pupils asymmetric (chronic per patient), but round and reactive to light.  No facial droop. No pronator drift. Moves all extremities.  4/5 strength throughout.   Extremities:  No peripheral edema. No calf tenderness.   Skin:  Dry. No visible rash.     Data   Data reviewed today: I reviewed all medications, new labs and imaging results over the last 24 hours.    Recent Labs   Lab 08/01/20  1218 08/01/20  1051    130*   POTASSIUM 2.5* 8.1*   CHLORIDE 93* 93*   CO2 29 27   ANIONGAP 14 10   GLC 52* 52*   BUN 11 12   CR 0.69 Canceled, Test credited   ANNABELLE 8.5  8.5   PROTTOTAL 6.6* 7.1   ALBUMIN 2.5* 2.4*   BILITOTAL 1.9* 2.5*   ALKPHOS 408* 386*   AST 76* Canceled, Test credited   ALT 65* 83*     Recent Labs   Lab 08/01/20  1051   WBC 9.3   RBC 2.83*   HGB 11.2*   HCT 32.0*   *   MCH 39.6*   MCHC 35.0   RDW 16.1*        Recent Labs   Lab 08/01/20  1051   INR 1.10        Glucose Values Latest Ref Rng & Units 8/1/2020 8/1/2020 8/1/2020   Bedside Glucose (mg/dl )  - -- -- --   GLUCOSE 70 - 99 mg/dL 52(L) 52(L) 92   Some recent data might be hidden        All labs personally reviewed in [facility].  See A&P for additional results.     Unresulted Labs Ordered in the Past 30 Days of this Admission     Date and Time Order Name Status Description    8/1/2020 1218 Vitamin B12 In process     8/1/2020 1051 Vitamin D Deficiency In process     8/1/2020 0950 Asymptomatic COVID-19 Virus (Coronavirus) by PCR In process

## 2020-08-01 NOTE — PROGRESS NOTES
"CLINICAL NUTRITION SERVICES - ASSESSMENT NOTE     Nutrition Prescription    RECOMMENDATIONS FOR MDs/PROVIDERS TO ORDER:  Due to pt's progressive weakness,over the past several weeks and nutritional decline, do not anticipate pt will be able to meet her nutritional needs with po diet, therefore would recommend initiating Enteral Nutrition Therapy with post-pyloric FT.    Malnutrition Status:    Severe malnutrition in the context of acute illness    Recommendations already ordered by Registered Dietitian (RD):  Order lab add on to check Mg and PO4 for review    Future/Additional Recommendations:  - If TF therapy ordered and lytes within acceptable limits (K+ and Mg WNL and PO4 is 2.0 or higher). recommend initiating TF with TwoCal HN @ 10 ml/hr x 12 hrs. If tolerated and lytes remain stable, increase rate by 10 ml every 12 hrs to goal of 30 mL/hr. Regimen provides 720 mL/day, 1440 kcals (34 kcal/kg/day), 60 g PRO (1.4 g/kg/day), 504 mL H2O, 158 g CHO and 4 g Fiber daily.  - Once TF initiated, order daily check of K+, Mg and PO4 until TF adv to goal rate to evaluate for refeeding and need for lyte replacement.  - H2O flushes of 30 ml every 4 hrs (for tube patency only).         REASON FOR ASSESSMENT  Minerva Blanco is a/an 75 year old female assessed by the dietitian for Provider Order - malnutrition     NUTRITION HISTORY  Unable to obtain via phone from pt at this time. Per chart review, pt presents with progressive weakness for past 3-4 weeks. Reported that pt hasn't been eating very well d/t lack of appetite and has lost 4 lbs in the past week or so.     CURRENT NUTRITION ORDERS  Diet: Regular  Intake/Tolerance: Unknown, pt just admitted this afternoon    LABS  K+ 2.5 (low); replaced per review of MAR  No Mg or PO4 obtained yet this admission  (+) ketones 40 per UA today    MEDICATIONS  Thera-Vit-M    ANTHROPOMETRICS  Height: 152.4 cm (5' 0\")  Most Recent Weight: 42.2 kg (93 lb) - today's wt (admit)  IBW: 45.5 kg " (92% of IBW)  BMI: Underweight BMI @ 18.17  Weight History: Per H&P, wt loss of 4 lbs reported by pt over the past week or so. Based on current wt of 93 lbs, pt has had a 4% wt loss over the past week or so.  Wt Readings from Last 10 Encounters:   08/01/20 42.2 kg (93 lb)   06/23/19 48.9 kg (107 lb 12.8 oz)   06/17/19 46.7 kg (103 lb)   06/12/19 46.4 kg (102 lb 6.4 oz)   06/05/19 49.2 kg (108 lb 8 oz)   05/28/19 50.2 kg (110 lb 9.6 oz)   05/22/19 47.7 kg (105 lb 3.2 oz)   05/18/19 47.4 kg (104 lb 9.6 oz)   05/13/19 48.1 kg (106 lb)   05/13/19 48.4 kg (106 lb 9.6 oz)       Dosing Weight: 42 kg (based on admit wt of 42.2 kg)    ASSESSED NUTRITION NEEDS  Estimated Energy Needs:1947-6867* kcals/day (30 - 35 kcals/kg )  Justification: Repletion and Underweight  Estimated Protein Needs:50-65 grams protein/day (1.2 -1.5 grams of pro/kg)  Justification: Repletion  Estimated Fluid Needs: (1 mL/kcal)   Justification: Maintenance    PHYSICAL FINDINGS  See malnutrition section below.  Unable to assess at this time    MALNUTRITION  % Intake: suspect </= 50% for >/= 5 days (severe), based on information provided per H&P  % Weight Loss: > 2% in 1 week (severe)  Subcutaneous Fat Loss: Unable to assess  Muscle Loss: Unable to assess  Fluid Accumulation/Edema: None noted  Malnutrition Diagnosis: Severe malnutrition in the context of acute illness    NUTRITION DIAGNOSIS  Inadequate oral intakes related to question progressive loss of appetite secondary to progressive weakness and not feeling as evidenced by 4% wt loss over the past week or so, underweight at 92% of IBW, BMI of 18.17 kg/m2 and positive ketones of 40.    INTERVENTIONS  Implementation  Nutrition Education: Unable to complete due to d/t unable to speak with pt at this time    Goals  1. Initiate of nutrition support within 2-3 days.     Monitoring/Evaluation  Progress toward goals will be monitored and evaluated per protocol.  Cate Mcintyre RD,LD  Weekend pager [phone]

## 2020-08-01 NOTE — ED TRIAGE NOTES
Triage Assessment & Note:    /75   Pulse 91   Resp 20   SpO2 96%       Patient presents with: Pt with MS from independent living BIBA from apartment with reports of weakness and fall this AM.  Pt reports lack of appetite. No reports of LOC, fever, cough, CP, or travel.     Home Treatments/Remedies: Home medication    Febrile / Afebrile: afebrile    Duration of C/o: week+  Khushi Yarbrough RN  August 1, 2020

## 2020-08-01 NOTE — ED PROVIDER NOTES
[place] [facility] ([facility])  August 1, 2020  History     Chief Complaint   Patient presents with     Fall     Generalized Weakness     Shortness of Breath     HPI  Minerva Blanco is a 75 year old female with a medical history significant for breast cancer, Atrial Fibrillation, CHF, fibromyalgia, GERD, IBS, multiple sclerosis, Ménière's disease, H/O blood transfusion, esophageal perforation, long-term use of anticoagulants, neck abscess, and moderate protein calorie malnutrition who presents the ED today complaining of a fall and generalized weakness.    Patient reports she fell at approximately 0830 am , when her legs buckled out from underneath her, hitting the back of her head on the carpet.  Patient denies any syncope in the fall.  She was unable to get up on her own and medics helped her up.  Patient endorses neck pain, and states she cannot raise her head up.  Patient reports she has not been eating very well and is feeling extremely weak.  She states she has no appetite and has lost about 4 pounds in the past week or so.  Patient reports she becomes short of breath with minimal exertion, and cannot stand for more than a few minutes at a time.  Patient reports that being this weak is very unusual for her as she is normally active and ambulating with her walker.  She has been slowly declining for the last month or so.  Patient reports she broke her back in 2 places a year ago and that her MS has not flared up on her for a while.  Patient reports she has not been tested for COVID-19, but denies any recent COVID-19 exposure.  Patient denies any fever.    She also complains of left ankle pain after the fall.  She states her multiple sclerosis has been stable for several years.    I have reviewed the Medications, Allergies, Past Medical and Surgical History, and Social History in the [facility] system.  PAST MEDICAL HISTORY:   Past Medical History:   Diagnosis Date     Arrhythmia     one  time event; no longer on meds     Atrial fibrillation (H)      Breast cancer (H) 2007    right side - lumpectomy, chemo, and local radiation     Chronic infection     infection/wound for two years after esoph surgery     Esophageal perforation      Fibromyalgia      GERD (gastroesophageal reflux disease)      Hiatal hernia      History of blood transfusion      IBS (irritable bowel syndrome)      Meniere's disease     deaf left ear     MS (multiple sclerosis) (H)      Multiple sclerosis (H)      Noninfectious ileitis      Other chronic pain        PAST SURGICAL HISTORY:   Past Surgical History:   Procedure Laterality Date     APPENDECTOMY       BACK SURGERY  5/1/2015    lumbar fusion     BRONCHOSCOPY FLEXIBLE N/A 4/17/2017    Procedure: BRONCHOSCOPY FLEXIBLE;;  Surgeon: Jens Wise MD;  [facility]     C GASTROSTOMY/JEJUN TUBE      J tube for feedings     CLOSE SPIT FISTULA N/A 4/17/2017    Procedure: CLOSE SPIT FISTULA;;  Surgeon: Jens Wise MD;  [facility]     COMBINED ESOPHAGOSCOPY, GASTROSCOPY, DUODENOSCOPY (EGD), REMOVE ESOPHAGEAL STENT N/A 8/26/2016    Procedure: COMBINED ESOPHAGOSCOPY, GASTROSCOPY, DUODENOSCOPY (EGD), REMOVE ESOPHAGEAL STENT;  Surgeon: Jens Wise MD;  [facility]     COMBINED ESOPHAGOSCOPY, GASTROSCOPY, DUODENOSCOPY (EGD), REMOVE ESOPHAGEAL STENT N/A 2/12/2018    Procedure: COMBINED ESOPHAGOSCOPY, GASTROSCOPY, DUODENOSCOPY (EGD), REMOVE ESOPHAGEAL STENT;  Esophagogastroduodenoscopy With Stent Removal;  Surgeon: Jens Wise MD;  [facility]     COMBINED ESOPHAGOSCOPY, GASTROSCOPY, DUODENOSCOPY (EGD), REPLACE ESOPHAGEAL STENT N/A 8/25/2017    Procedure: COMBINED ESOPHAGOSCOPY, GASTROSCOPY, DUODENOSCOPY (EGD), REPLACE ESOPHAGEAL STENT;;  Surgeon: Jens Wise MD;  [facility]     COMBINED ESOPHAGOSCOPY, GASTROSCOPY, DUODENOSCOPY (EGD), REPLACE ESOPHAGEAL STENT N/A 9/15/2017    Procedure: COMBINED  ESOPHAGOSCOPY, GASTROSCOPY, DUODENOSCOPY (EGD), REPLACE ESOPHAGEAL STENT;  Upper Endoscopy with Stent Exchange, Cauterization of Tracheosophageal Fistula, Cauterization of Chest Wound   ;  Surgeon: Jens Wise MD;  [facility]     COMBINED ESOPHAGOSCOPY, GASTROSCOPY, DUODENOSCOPY (EGD), REPLACE ESOPHAGEAL STENT N/A 10/20/2017    Procedure: COMBINED ESOPHAGOSCOPY, GASTROSCOPY, DUODENOSCOPY (EGD), REPLACE ESOPHAGEAL STENT;  Upper Endoscopy with Stent Exchange, Cauterization Tracheosphageal Fistula Tract;  Surgeon: Nalini Barreto MD;  [facility]     COMBINED ESOPHAGOSCOPY, GASTROSCOPY, DUODENOSCOPY (EGD), REPLACE ESOPHAGEAL STENT N/A 11/3/2017    Procedure: COMBINED ESOPHAGOSCOPY, GASTROSCOPY, DUODENOSCOPY (EGD), REPLACE ESOPHAGEAL STENT;  Esophagogastroduodenoscopy, Stent Revision, Cauterization Of Traceoesophageal Fistula and stent exchange;  Surgeon: Nalini Barreto MD;  [facility]     COMBINED ESOPHAGOSCOPY, GASTROSCOPY, DUODENOSCOPY (EGD), REPLACE ESOPHAGEAL STENT N/A 11/17/2017    Procedure: COMBINED ESOPHAGOSCOPY, GASTROSCOPY, DUODENOSCOPY (EGD), REPLACE ESOPHAGEAL STENT;  Esophagogastroduodenoscopy, Esophogeal Stent Removal, Esophogeal Stent Placement (23x100 EndoMAXX), Cauterization Of Fistula Tract;  Surgeon: Nalini Barreto MD;  [facility]     CREATE SPIT FISTULA N/A 1/9/2017    Procedure: CREATE SPIT FISTULA;  Surgeon: Jens Wise MD;  [facility]     ESOPHAGECTOMY N/A 1/9/2017    Procedure: ESOPHAGECTOMY;  Surgeon: Jens Wise MD;  [facility]     ESOPHAGOSCOPY, GASTROSCOPY, DUODENOSCOPY (EGD), COMBINED N/A 4/18/2016    Procedure: COMBINED ESOPHAGOSCOPY, GASTROSCOPY, DUODENOSCOPY (EGD);  Surgeon: Alexis Barraza MD;  [facility]     ESOPHAGOSCOPY, GASTROSCOPY, DUODENOSCOPY (EGD), COMBINED N/A 4/25/2016    Procedure: COMBINED ESOPHAGOSCOPY, GASTROSCOPY, DUODENOSCOPY (EGD);  Surgeon: Alexis Barraza MD;  [facility]     ESOPHAGOSCOPY, GASTROSCOPY, DUODENOSCOPY (EGD), COMBINED N/A 5/4/2016    Procedure: COMBINED ESOPHAGOSCOPY, GASTROSCOPY, DUODENOSCOPY (EGD);  Surgeon: Alexis Barraza MD;  [facility]     ESOPHAGOSCOPY, GASTROSCOPY, DUODENOSCOPY (EGD), COMBINED N/A 5/18/2016    Procedure: COMBINED ESOPHAGOSCOPY, GASTROSCOPY, DUODENOSCOPY (EGD);  Surgeon: Alexis Barraza MD;  [facility]     ESOPHAGOSCOPY, GASTROSCOPY, DUODENOSCOPY (EGD), COMBINED N/A 6/22/2016    Procedure: COMBINED ESOPHAGOSCOPY, GASTROSCOPY, DUODENOSCOPY (EGD);  Surgeon: Alexis Barraza MD;  [facility]     ESOPHAGOSCOPY, GASTROSCOPY, DUODENOSCOPY (EGD), COMBINED N/A 7/12/2016    Procedure: COMBINED ESOPHAGOSCOPY, GASTROSCOPY, DUODENOSCOPY (EGD);  Surgeon: Jens Wise MD;  [facility]     ESOPHAGOSCOPY, GASTROSCOPY, DUODENOSCOPY (EGD), COMBINED N/A 4/21/2017    Procedure: COMBINED ESOPHAGOSCOPY, GASTROSCOPY, DUODENOSCOPY (EGD);  Esophagogastroduodenoscopy, Pharyngostomy Tube Placement ;  Surgeon: Jens Wise MD;  [facility]     ESOPHAGOSCOPY, GASTROSCOPY, DUODENOSCOPY (EGD), COMBINED N/A 5/19/2017    Procedure: COMBINED ESOPHAGOSCOPY, GASTROSCOPY, DUODENOSCOPY (EGD);  Upper Endoscopy, Irrigation and Debridement of Chest Wound ;  Surgeon: Nalini Barreto MD;  [facility]     ESOPHAGOSCOPY, GASTROSCOPY, DUODENOSCOPY (EGD), COMBINED N/A 5/23/2017    Procedure: COMBINED ESOPHAGOSCOPY, GASTROSCOPY, DUODENOSCOPY (EGD);;  Surgeon: Nalini Barreto MD;  [facility]     ESOPHAGOSCOPY, GASTROSCOPY, DUODENOSCOPY (EGD), COMBINED N/A 6/27/2017    Procedure: COMBINED ESOPHAGOSCOPY, GASTROSCOPY, DUODENOSCOPY (EGD);  Esophagogastroduodenoscopy With Removal And Replacement Of Esophageal Stent exchange. Exchange of pharyngtomy tube. Chest wound dressing change;  Surgeon: Nalini Barreto MD;  [facility]     ESOPHAGOSCOPY, GASTROSCOPY, DUODENOSCOPY (EGD), COMBINED N/A 8/4/2017     Procedure: COMBINED ESOPHAGOSCOPY, GASTROSCOPY, DUODENOSCOPY (EGD);  Esophagogastroduodenoscopy, Stent Removal and Stent Replacement. Dressing Change ;  Surgeon: Nalini Barreto MD;  [facility]     ESOPHAGOSCOPY, GASTROSCOPY, DUODENOSCOPY (EGD), COMBINED N/A 8/25/2017    Procedure: COMBINED ESOPHAGOSCOPY, GASTROSCOPY, DUODENOSCOPY (EGD);  Esophagogastroduodenoscopy,  Stent Revision,  Cauterization;  Surgeon: Jens Wise MD;  [facility]     ESOPHAGOSCOPY, GASTROSCOPY, DUODENOSCOPY (EGD), COMBINED N/A 10/2/2017    Procedure: COMBINED ESOPHAGOSCOPY, GASTROSCOPY, DUODENOSCOPY (EGD);  Esophagogastroduodenostomy, Replacement of Esophageal Stent, Cauterization of Tracheosophageal Fistula anc chest wall,   C-arm;  Surgeon: Nalini Barreto MD;  [facility]     ESOPHAGOSCOPY, GASTROSCOPY, DUODENOSCOPY (EGD), DILATATION, COMBINED N/A 6/29/2016    Procedure: COMBINED ESOPHAGOSCOPY, GASTROSCOPY, DUODENOSCOPY (EGD), DILATATION;  Surgeon: Jens Wise MD;  [facility]     EXPLORE NECK N/A 5/19/2017    Procedure: EXPLORE NECK;;  Surgeon: Nalini Barreto MD;  [facility]     HC UGI ENDOSCOPY W TRANSENDOSCOPIC STENT PLACEMENT N/A 7/12/2016    Procedure: COMBINED ESOPHAGOSCOPY, GASTROSCOPY, DUODENOSCOPY (EGD), PLACE TRANSENDOSCOPIC ESOPHAGEAL STENT;  Surgeon: Jens Wise MD;  [facility]     HC UGI ENDOSCOPY W TRANSENDOSCOPIC STENT PLACEMENT N/A 7/22/2016    Procedure: COMBINED ESOPHAGOSCOPY, GASTROSCOPY, DUODENOSCOPY (EGD), PLACE TRANSENDOSCOPIC ESOPHAGEAL STENT;  Surgeon: Jens Wise MD;  [facility]     INCISION AND DRAINAGE CHEST WASHOUT, COMBINED N/A 5/27/2017    Procedure: COMBINED INCISION AND DRAINAGE CHEST WASHOUT;  Chest washout;  Surgeon: Nalini Barreto MD;  [facility]     INCISION AND DRAINAGE CHEST WASHOUT, COMBINED N/A 5/28/2017    Procedure: COMBINED INCISION AND DRAINAGE CHEST WASHOUT;  Chest washout;  Surgeon: Nalini Barreto  MD;  [facility]     INCISION AND DRAINAGE CHEST WASHOUT, COMBINED N/A 6/9/2017    Procedure: COMBINED INCISION AND DRAINAGE CHEST WASHOUT;  Chest washout and dressing change, Esophaogastroduodenoscopy;  Surgeon: Jens Wise MD;  [facility]     INCISION AND DRAINAGE CHEST WASHOUT, COMBINED N/A 7/17/2017    Procedure: COMBINED INCISION AND DRAINAGE CHEST WASHOUT;  Incision and Drainage of right Chest Wound, Esophagogastroduodenoscopy with stent exchange. pharangostomy tube revision;  Surgeon: Jens Wise MD;  [facility]     IRRIGATION AND DEBRIDEMENT CHEST WASHOUT, COMBINED N/A 6/29/2016    Procedure: COMBINED IRRIGATION AND DEBRIDEMENT CHEST WASHOUT;  Surgeon: Jens Wise MD;  [facility]     IRRIGATION AND DEBRIDEMENT CHEST WASHOUT, COMBINED N/A 5/23/2017    Procedure: COMBINED IRRIGATION AND DEBRIDEMENT CHEST WASHOUT;  COMBINED IRRIGATION AND DEBRIDEMENT CHEST WASHOUT,COMBINED ESOPHAGOSCOPY, GASTROSCOPY, DUODENOSCOPY (EGD) ;  Surgeon: Nalini Barreto MD;  [facility]     IRRIGATION AND DEBRIDEMENT CHEST WASHOUT, COMBINED N/A 5/24/2017    Procedure: COMBINED IRRIGATION AND DEBRIDEMENT CHEST WASHOUT;  Chest wound Washout and Dressing Change;  Surgeon: Nalini Barreto MD;  [facility]     IRRIGATION AND DEBRIDEMENT CHEST WASHOUT, COMBINED N/A 5/25/2017    Procedure: COMBINED IRRIGATION AND DEBRIDEMENT CHEST WASHOUT;  Irrigation and Debridement Chest Washout and dressing change;  Surgeon: Nalini Barreto MD;  [facility]     IRRIGATION AND DEBRIDEMENT CHEST WASHOUT, COMBINED N/A 5/26/2017    Procedure: COMBINED IRRIGATION AND DEBRIDEMENT CHEST WASHOUT;  Irrigation and Debridement Chest Washout with  dressing change;  Surgeon: Nalini Barreto MD;  [facility]     IRRIGATION AND DEBRIDEMENT CHEST WASHOUT, COMBINED N/A 5/30/2017    Procedure: COMBINED IRRIGATION AND DEBRIDEMENT CHEST WASHOUT;  Irrigation and Debridement Chest Washout , PICC line  dressing change;  Surgeon: Nalini Barreto MD;  [facility]     IRRIGATION AND DEBRIDEMENT CHEST WASHOUT, COMBINED N/A 5/31/2017    Procedure: COMBINED IRRIGATION AND DEBRIDEMENT CHEST WASHOUT;  Irrigation and Debridement Chest Washout ;  Surgeon: Nalini Barreto MD;  [facility]     IRRIGATION AND DEBRIDEMENT CHEST WASHOUT, COMBINED N/A 6/1/2017    Procedure: COMBINED IRRIGATION AND DEBRIDEMENT CHEST WASHOUT;  Chest Wound Irrigation And Debridement with dressing change ;  Surgeon: Nalini Barreto MD;  [facility]     IRRIGATION AND DEBRIDEMENT CHEST WASHOUT, COMBINED N/A 6/4/2017    Procedure: COMBINED IRRIGATION AND DEBRIDEMENT CHEST WASHOUT;  COMBINED IRRIGATION AND DEBRIDEMENT CHEST WASHOUT, Esophagoscopy, Gastroscopy, Duodenoscopy (EGD) place transendoscopic esophageal stent,   Pharyngostomy and chest wall tube exchange  C-Arm;  Surgeon: Jens Wise MD;  [facility]     IRRIGATION AND DEBRIDEMENT CHEST WASHOUT, COMBINED N/A 6/2/2017    Procedure: COMBINED IRRIGATION AND DEBRIDEMENT CHEST WASHOUT;  Chest Wound Irrigation and Debridement, Dressing Change;  Surgeon: Nalini Barreto MD;  [facility]     LAPAROSCOPIC ASSISTED INSERTION TUBE JEJUNOSTOMY N/A 4/17/2017    Procedure: LAPAROSCOPIC ASSISTED INSERTION TUBE JEJUNOSTOMY;;  Surgeon: Jens Wise MD;  [facility]     LAPAROSCOPY DIAGNOSTIC (GENERAL) N/A 1/9/2017    Procedure: LAPAROSCOPY DIAGNOSTIC (GENERAL);  Surgeon: Jens Wise MD;  [facility]     LAPAROSCOPY DIAGNOSTIC (GENERAL) N/A 4/17/2017    Procedure: LAPAROSCOPY DIAGNOSTIC (GENERAL);  Laparoscopic , Neck Dissection, Spit Fistula Takedown, Laparoscopic Jejunostomy Tube and Pharyngostomy Tube, Gastric Pull up, Upper Endoscopy(EGD)  , Flexible Bronchoscopy ,Sternotomy ;  Surgeon: Jens Wise MD;  [facility]     LUMPECTOMY BREAST Right 2007     NERVE BLOCK PERIPHERAL N/A 8/30/2016    Procedure: NERVE BLOCK  "PERIPHERAL;  Surgeon: GENERIC ANESTHESIA PROVIDER;  [facility]     NISSEN FUNDOPLICATION  1/2016     PHARYNGOSTOMY N/A 4/18/2016    Procedure: PHARYNGOSTOMY;  Surgeon: Alexis Barraza MD;  [facility]     PHARYNGOSTOMY N/A 4/25/2016    Procedure: PHARYNGOSTOMY;  Surgeon: Alexis Barraza MD;  [facility]     PHARYNGOSTOMY N/A 5/4/2016    Procedure: PHARYNGOSTOMY;  Surgeon: Alexis Barraza MD;  [facility]     PHARYNGOSTOMY N/A 6/22/2016    Procedure: PHARYNGOSTOMY;  Surgeon: Alexis Barraza MD;  [facility]     PHARYNGOSTOMY N/A 6/29/2016    Procedure: PHARYNGOSTOMY;  Surgeon: Jens Wise MD;  [facility]     PHARYNGOSTOMY N/A 4/21/2017    Procedure: PHARYNGOSTOMY;;  Surgeon: Jens Wise MD;  [facility]     PHARYNGOSTOMY Left 5/31/2017    Procedure: PHARYNGOSTOMY;;  Surgeon: Nalini Barreto MD;  [facility]     PICC INSERTION Left 8/25/2016    5fr DL BioFlo PICC, 42cm (3cm external) in the L medial brachial vein w/ tip in the SVC RA junction.     PICC INSERTION - Rewire Right 05/31/2017    5fr DL BioFlo PICC, 40cm (6cm external) in the R medial brachial vein w/ tip in the SVC RA junction.     REPAIR FISTULA TRACHEOESOPHAGEAL N/A 9/15/2017    Procedure: REPAIR FISTULA TRACHEOESOPHAGEAL;;  Surgeon: Jens Wise MD;  [facility]     THORACOTOMY Right 1998    lung infection - \"hard crust formed on lung\"     THORACOTOMY Left 1/9/2017    Procedure: THORACOTOMY;  Surgeon: Jens Wise MD;  [facility]     WRIST SURGERY         Past medical history, past surgical history, medications, and allergies were reviewed with the patient. Additional pertinent items: None    FAMILY HISTORY:   Family History   Problem Relation Age of Onset     Alzheimer Disease Mother      Cerebrovascular Disease Father         cerebral hemorrhage     Ovarian Cancer Sister      Breast Cancer Daughter        SOCIAL " HISTORY:   Social History     Tobacco Use     Smoking status: Former Smoker     Packs/day: 1.00     Years: 15.00     Pack years: 15.00     Types: Cigarettes     Last attempt to quit: 1998     Years since quittin.5     Smokeless tobacco: Never Used   Substance Use Topics     Alcohol use: No     Alcohol/week: 0.0 standard drinks     Social history was reviewed with the patient. Additional pertinent items: None      Patient's Medications   New Prescriptions    No medications on file   Previous Medications    ACETAMINOPHEN (TYLENOL EXTRA STRENGTH PO)    Take 1,000 mg by mouth 3 times daily And 500 mg by mouth 2 times daily as needed    ASPIRIN 81 MG EC TABLET    Take 81 mg by mouth daily    ATORVASTATIN (LIPITOR) 80 MG TABLET    Take 80 mg by mouth At Bedtime     BENZONATATE (TESSALON) 100 MG CAPSULE    Take 100 mg by mouth 3 times daily as needed for cough    CALCIUM CARB-CHOLECALCIFEROL (OS-ANNABELLE PO)    Take 1 tablet by mouth 3 times daily (with meals)    DULOXETINE (CYMBALTA) 30 MG CAPSULE    Take 30 mg by mouth daily    FERROUS SULFATE (FEROSUL) 325 (65 FE) MG TABLET    Take 325 mg by mouth daily (with breakfast)    FUROSEMIDE (LASIX) 20 MG TABLET    Take 40 mg by mouth 2 times daily     GABAPENTIN (NEURONTIN) 400 MG CAPSULE    Take 400 mg by mouth 2 times daily    HYDROMORPHONE (DILAUDID) 2 MG TABLET    Take 2 mg by mouth 2 times daily as needed for severe pain     LIDOCAINE (LIDODERM) 5 % PATCH    Apply to painful area topically 2 times a day - 12 hours on and 12 hours off    LOPERAMIDE (IMODIUM A-D) 2 MG TABLET    Take 2 mg by mouth daily as needed Give 4mg with first loose stool AND Give 2 mg by mouth as needed for Loose Stools 2 mg with each subsequent loose stool episode after initial 4 mg first dose. NOT TO EXCEED 16 MG IN 24    MAGNESIUM OXIDE 400 MG CAPS    Take 400 mg by mouth 2 times daily    MEDICAL CANNABIS (PATIENT'S OWN SUPPLY.  NOT A PRESCRIPTION)    1 Units Patient report she takes at home.   "Not taking at TCU    MELATONIN PO    Take 10 mg by mouth At Bedtime     METOPROLOL SUCCINATE (TOPROL-XL) 50 MG 24 HR TABLET    Take 25 mg by mouth daily     MULTIVITAMIN W/MINERALS (THERA-VIT-M) TABLET    Take 1 tablet by mouth daily    NUTRITIONAL SUPPLEMENTS (NUTRITIONAL SUPPLEMENT PO)    Take by mouth At Bedtime Snack at  hs.    OTHER MEDICAL SUPPLIES    every morning Daily weights.    OTHER MEDICAL SUPPLIES    4 times daily BP checks qid.    OTHER MEDICAL SUPPLIES    Legs: Black socks and Compriflex LEs. Thigh high wraps. Leave on at all times. If soiled may remove thigh portions.    every shift    POLYETHYLENE GLYCOL (MIRALAX/GLYCOLAX) PACKET    Take 1 packet by mouth daily as needed for constipation    POTASSIUM CHLORIDE ER (K-DUR/KLOR-CON M) 20 MEQ CR TABLET    Take 40 mEq by mouth 2 times daily     RANITIDINE (ZANTAC) 75 MG TABLET    Take 75 mg by mouth 2 times daily Before lunch and at bedtime    SENNA-DOCUSATE (SENOKOT-S/PERICOLACE) 8.6-50 MG TABLET    Take 1 tablet by mouth 2 times daily    TRAMADOL HCL PO    Take 25 mg by mouth 4 times daily as needed for moderate to severe pain    TRAZODONE HCL PO    Take 150 mg by mouth At Bedtime    Modified Medications    No medications on file   Discontinued Medications    No medications on file          Allergies   Allergen Reactions     Amoxicillin Diarrhea     Ativan [Lorazepam] Other (See Comments)     Hallucinations     Morphine Itching        Review of Systems   Constitutional: Positive for activity change (less active), appetite change (Reduced appetite) and fatigue. Negative for fever.   Respiratory: Positive for shortness of breath (With minimal exertion).    Cardiovascular: Negative for chest pain.   Gastrointestinal: Negative for abdominal pain, nausea and vomiting.   Genitourinary: Negative.    Musculoskeletal: Positive for neck pain (\"Cannot lift head\").   Skin: Negative for rash.   Neurological: Positive for weakness.   Psychiatric/Behavioral: " Negative.    All other systems reviewed and are negative.        Physical Exam   BP: 119/75  Pulse: 91  Heart Rate: 90  Temp: 97.9  F (36.6  C)  Resp: 20  Height: 152.4 cm (5')  Weight: 42.2 kg (93 lb)  SpO2: 96 %  /81   Pulse 89   Temp 97.9  F (36.6  C) (Oral)   Resp 28   Ht 1.524 m (5')   Wt 42.2 kg (93 lb)   SpO2 97%   BMI 18.16 kg/m       Physical Exam  Physical Exam   Constitutional:   Elderly female, thin, resting comfortably  HENT:   Head: Normocephalic, contusion to occiput.  Eyes: Conjunctivae are normal. Pupils: Right pupil > left (optic neuritis on right), left pupil reactive   pharynx has no erythema or exudate, mucous membranes are moist  Neck:  Diffuse tenderness to posterior neck, no adenopathy  Cardiovascular: regular rate and rhythm without murmurs or gallops  Pulmonary/Chest: bibasilar crackles, with no wheezes or retractions. No respiratory distress.  GI: Soft with good bowel sounds.  Non-tender, non-distended, with no guarding, no rebound, no peritoneal signs.   Back:  No bony or CVA tenderness   Musculoskeletal: No peripheral edema, multiple scabbed lesions to left anterior shin,   Left knee:  Nontender, no appreciable effusion  No tenderness to fibular head  Left ankle: Mild swelling to the lateral malleolus, no deformity, no ecchymosis\  Left foot: no tenderness over 5th MT  Skin: Skin is warm and dry. No rash noted.   Neurological: alert and oriented to person, place, and time. Nonfocal exam,DUNBAR, full  strength,   Able to lift legs against gravity  Psychiatric:  normal mood and affect.   ED Course   9:52 AM  The patient was seen and examined by Deann Tong MD in Room ED06.        Procedures             EKG Interpretation:      Interpreted by Deann Tong MD  Time reviewed: 1046 am  Symptoms at time of EKG: see hpi   Rhythm: normal sinus   Rate: Normal  Axis: Normal  Ectopy: none  Conduction: normal  ST Segments/ T Waves: Poor R wave progression, diffusely flattened T  waves.  Q Waves: none  Comparison to prior: 2/12/2018: NSR rate of 72 bpm  Clinical Impression: NSR rate of 89 bpm. Low voltage QRS, poor R wave progression, diffusely flattened T waves                   Trauma:  Level of trauma activation: Emergency Department evaluation  C-collar and immobilization: not indicated, cleared.  CSpine Clearance: by CT C spine  GCS at arrival: 15  GCS at disposition: unchanged  Full Primary and Secondary survey with appropriate immobilization of spine completed in exam section.  Consults prior to admission or transfer: None  Procedures done in the ED: none          Results for orders placed or performed during the hospital encounter of 08/01/20 (from the past 24 hour(s))   EKG 12-lead, tracing only   Result Value Ref Range    Interpretation ECG Click View Image link to view waveform and result    CBC with platelets differential   Result Value Ref Range    WBC 9.3 4.0 - 11.0 10e9/L    RBC Count 2.83 (L) 3.8 - 5.2 10e12/L    Hemoglobin 11.2 (L) 11.7 - 15.7 g/dL    Hematocrit 32.0 (L) 35.0 - 47.0 %     (H) 78 - 100 fl    MCH 39.6 (H) 26.5 - 33.0 pg    MCHC 35.0 31.5 - 36.5 g/dL    RDW 16.1 (H) 10.0 - 15.0 %    Platelet Count 228 150 - 450 10e9/L    Diff Method Automated Method     % Neutrophils 84.1 %    % Lymphocytes 6.8 %    % Monocytes 7.7 %    % Eosinophils 0.2 %    % Basophils 0.3 %    % Immature Granulocytes 0.9 %    Nucleated RBCs 0 0 /100    Absolute Neutrophil 7.8 1.6 - 8.3 10e9/L    Absolute Lymphocytes 0.6 (L) 0.8 - 5.3 10e9/L    Absolute Monocytes 0.7 0.0 - 1.3 10e9/L    Absolute Eosinophils 0.0 0.0 - 0.7 10e9/L    Absolute Basophils 0.0 0.0 - 0.2 10e9/L    Abs Immature Granulocytes 0.1 0 - 0.4 10e9/L    Absolute Nucleated RBC 0.0    Comprehensive metabolic panel   Result Value Ref Range    Sodium 130 (L) 133 - 144 mmol/L    Potassium 8.1 (HH) 3.4 - 5.3 mmol/L    Chloride 93 (L) 94 - 109 mmol/L    Carbon Dioxide 27 20 - 32 mmol/L    Anion Gap 10 3 - 14 mmol/L    Glucose  52 (L) 70 - 99 mg/dL    Urea Nitrogen 12 7 - 30 mg/dL    Creatinine Canceled, Test credited 0.52 - 1.04 mg/dL    GFR Estimate Not Calculated >60 mL/min/[1.73_m2]    GFR Estimate If Black Not Calculated >60 mL/min/[1.73_m2]    Calcium 8.5 8.5 - 10.1 mg/dL    Bilirubin Total 2.5 (H) 0.2 - 1.3 mg/dL    Albumin 2.4 (L) 3.4 - 5.0 g/dL    Protein Total 7.1 6.8 - 8.8 g/dL    Alkaline Phosphatase 386 (H) 40 - 150 U/L    ALT 83 (H) 0 - 50 U/L    AST Canceled, Test credited 0 - 45 U/L   Lipase   Result Value Ref Range    Lipase <10 (L) 73 - 393 U/L   Lactic acid whole blood   Result Value Ref Range    Lactic Acid 1.5 0.7 - 2.0 mmol/L   Troponin I   Result Value Ref Range    Troponin I ES Canceled, Test credited 0.000 - 0.045 ug/L   TSH with free T4 reflex   Result Value Ref Range    TSH 1.60 0.40 - 4.00 mU/L   XR Ankle Left G/E 3 Views    Narrative    3 views left ankle radiographs 8/1/2020 11:25 AM    History: fall    Comparison: None    Findings:    Nonweightbearing AP, oblique, and lateral  views of the left ankle  were obtained.     No acute osseous abnormality.      Ankle mortise and syndesmosis are congruent on this non-weight bearing  study. Bones appear osteopenic.    Mild diffuse soft tissue prominence.      Impression    Impression:    No acute osseous abnormality.    MORIS CLEMENS MD (Joe)   Head CT w/o contrast    Narrative    CT HEAD W/O CONTRAST 8/1/2020 11:21 AM    History: Fall hit back of head.    Per EPIC: Complaining of?a?fall and generalized weakness. Patient  reports she fell at approximately 0830 am?,?when her legs buckled out  from underneath her, hitting the back of her head on the carpet.?    Comparison: No similar previous study.    Technique: Using multidetector thin collimation helical acquisition  technique, axial, coronal and sagittal CT images from the skull base  to the vertex were obtained without intravenous contrast.    Findings:     No intracranial hemorrhage, mass effect, or midline  shift. Gray/white  matter differentiation in both cerebral hemispheres is preserved.  Moderate parenchymal volume loss of the frontal and temporal lobes.  Patchy periventricular hypoattenuation, most likely represents chronic  small vessel ischemic disease. Ventricles are proportionate to the  cerebral sulci. The basal cisterns are clear.    The bony calvaria and the bones of the skull base are normal. The  visualized portions of the paranasal sinuses and mastoid air cells are  clear. Right parietal scalp edema.      Impression    Impression:  1. No acute intracranial pathology.   2. Frontotemporal cerebral atrophy. Mild chronic small vessel ischemic  disease.    I have personally reviewed the examination and initial interpretation  and I agree with the findings.    WILLIAM HEDRICK MD   Cervical spine CT w/o contrast    Narrative    CT CERVICAL SPINE W/O CONTRAST 8/1/2020 11:36 AM    Provided History: Fall posterior neck pain    Comparison: No similar previous study. Chest CT 11/1/2017.    Technique: Using multidetector thin collimation helical acquisition  technique, axial, coronal and sagittal CT images through the cervical  spine were obtained without intravenous contrast.     Findings:  No acute fracture or traumatic subluxation. No prevertebral edema.  Straightening of cervical lordosis. Degenerative grade 1  anterolisthesis C4 over C5 and C7 over T1. Disc space narrowing and  endplate degenerative changes at C5-T1. Multilevel uncovertebral  spurring and facet arthropathy. Multilevel cervical spondylosis, most  pronounced with left C3-4 severe neural foraminal narrowing. No  high-grade spinal canal narrowing at any level. No abnormality of the  paraspinous soft tissues.    Atherosclerotic calcification of both carotid bifurcations.    Partially visualized anterior mediastinal esophageal conduit.      Impression    Impression:   1. No acute fracture or traumatic subluxation.  2. Multilevel cervical spondylosis,  most pronounced with left C3-4  severe neural foraminal stenosis. No high-grade spinal canal stenosis  at any level.     I have personally reviewed the examination and initial interpretation  and I agree with the findings.    WILLIAM HEDRICK MD   Comprehensive metabolic panel   Result Value Ref Range    Sodium 136 133 - 144 mmol/L    Potassium 2.5 (LL) 3.4 - 5.3 mmol/L    Chloride 93 (L) 94 - 109 mmol/L    Carbon Dioxide 29 20 - 32 mmol/L    Anion Gap 14 3 - 14 mmol/L    Glucose 52 (L) 70 - 99 mg/dL    Urea Nitrogen 11 7 - 30 mg/dL    Creatinine 0.69 0.52 - 1.04 mg/dL    GFR Estimate 85 >60 mL/min/[1.73_m2]    GFR Estimate If Black >90 >60 mL/min/[1.73_m2]    Calcium 8.5 8.5 - 10.1 mg/dL    Bilirubin Total 1.9 (H) 0.2 - 1.3 mg/dL    Albumin 2.5 (L) 3.4 - 5.0 g/dL    Protein Total 6.6 (L) 6.8 - 8.8 g/dL    Alkaline Phosphatase 408 (H) 40 - 150 U/L    ALT 65 (H) 0 - 50 U/L    AST 76 (H) 0 - 45 U/L   Troponin I   Result Value Ref Range    Troponin I ES 0.062 (H) 0.000 - 0.045 ug/L     Medications   simethicone (MYLICON) chewable tablet 80 mg (80 mg Oral Given 8/1/20 1315)   0.9% sodium chloride BOLUS (1,000 mLs Intravenous New Bag 8/1/20 1242)   potassium chloride 10 mEq in 100 mL intermittent infusion with 10 mg lidocaine (has no administration in time range)   omeprazole (priLOSEC) CR capsule 20 mg (20 mg Oral Given 8/1/20 1035)   oxyCODONE (ROXICODONE) tablet 5 mg (5 mg Oral Given 8/1/20 1239)   potassium chloride ER (KLOR-CON M) CR tablet 40 mEq (40 mEq Oral Given 8/1/20 1317)             Assessments & Plan (with Medical Decision Making)         I have reviewed the nursing notes.  Emergency Department course:  The patient was seen and examined at 0940 am.   EKG shows NSR rate of 89 bpm. Low voltage QRS, poor R wave progression, diffusely flattened T waves.  I also treated the patient with omeprazole for stomach upset, followed by simethicone.  The patient was having a headache and posterior neck pain after a fall  and she received Norco p.o.    Laboratory studies show an unremarkable lactate of 1.5.  CBC shows anemia, with a hemoglobin of 11.2.  WBC is 9.3  TSH is normal  Initial CMP was grossly hemolyzed and needed to be redrawn.  The patient is hypokalemic, with a potassium of 2.5.  In addition,  she is hypoglycemic, with a glucose of 52.  Alkaline phosphatase, ALT, and AST are elevated.  Bilirubin is high at 1.9.  Lipase is less than 10.  In addition, the patient has an elevated troponin of 0.062.  She has no current chest pain.    UA shows 40 ketones.    CMP shows   Head CT shows.Impression:  1. No acute intracranial pathology.   2. Frontotemporal cerebral atrophy. Mild chronic small vessel ischemic  disease.    C spine CT shows Impression:   1. No acute fracture or traumatic subluxation.  2. Multilevel cervical spondylosis, most pronounced with left C3-4  severe neural foraminal stenosis. No high-grade spinal canal stenosis  at any level.   Left ankle xray shows    No acute osseous abnormality with mild diffuse soft tissue prominence. This suggests she has an ankle sprain.     Chest Xray shows: Impression:  1. Bilateral blunting of costophrenic angles likely chronic pleural  change, although pleural effusion is possible. Lungs otherwise clear.  I treated her with IV and p.o. potassium for hypokalemia.  In addition the patient was fed lunch.    COVID-19 test has been ordered and is pending.    The patient is a 75-year-old female with a complicated past medical history who presents with generalized weakness and fall.  Her weakness has been progressive over the past month or so, and she was unable to get off the floor without help.  She has not been eating well and has no appetite.  She has an elevated troponin and this will need to be trended.  She is also dyspneic with minimal exertion.  She will be admitted to the medicine service for further evaluation and treatment.  I spoke with Dr. Pace of Medicine regarding  admission        I have reviewed the findings, diagnosis, plan and need for follow up with the patient.    New Prescriptions    No medications on file       Final diagnoses:   Weakness   Hypokalemia   Hypoglycemia   Fall, initial encounter   I, Dennys Ayala, am serving as a trained medical scribe to document services personally performed by Deann Tong MD, based on the provider's statements to me.     I, Deann Tong MD, was physically present and have reviewed and verified the accuracy of this note documented by Dennys Ayala.  This note was created in part by the use of Dragon voice recognition dictation system. Inadvertent grammatical errors and typographical errors may still exist.  Deann Tong MD    This note was created in part by the use of Dragon voice recognition dictation system. Inadvertent grammatical errors and typographical errors may still exist.  Deann Tong MD    8/1/2020   [facility], [place], EMERGENCY DEPARTMENT     Deann Tong MD  08/01/20 1440

## 2020-08-01 NOTE — ED NOTES
[facility], [place]   ED Nurse to Floor Handoff     Minerva Blanco is a 75 year old female who speaks English and lives with others,  in an assisted living  They arrived in the ED by ambulance from home    ED Chief Complaint: Fall; Generalized Weakness; and Shortness of Breath    ED Dx;   Final diagnoses:   Weakness   Hypokalemia   Hypoglycemia   Fall, initial encounter         Needed?: No    Allergies:   Allergies   Allergen Reactions     Amoxicillin Diarrhea     Ativan [Lorazepam] Other (See Comments)     Hallucinations     Morphine Itching   .  Past Medical Hx:   Past Medical History:   Diagnosis Date     Arrhythmia     one time event; no longer on meds     Atrial fibrillation (H)      Breast cancer (H) 2007    right side - lumpectomy, chemo, and local radiation     Chronic infection     infection/wound for two years after esoph surgery     Esophageal perforation      Fibromyalgia      GERD (gastroesophageal reflux disease)      Hiatal hernia      History of blood transfusion      IBS (irritable bowel syndrome)      Meniere's disease     deaf left ear     MS (multiple sclerosis) (H)      Multiple sclerosis (H)      Noninfectious ileitis      Other chronic pain       Baseline Mental status: WDL  Current Mental Status changes: at basesline    Infection present or suspected this encounter: no  Sepsis suspected: No  Isolation type: Special Precautions-COVID-19     Activity level - Baseline/Home:  Walker  Activity Level - Current:   Walker    Bariatric equipment needed?: No    In the ED these meds were given:   Medications   simethicone (MYLICON) chewable tablet 80 mg (80 mg Oral Given 8/1/20 1315)   0.9% sodium chloride BOLUS (1,000 mLs Intravenous New Bag 8/1/20 1242)   potassium chloride 10 mEq in 100 mL intermittent infusion with 10 mg lidocaine (has no administration in time range)   omeprazole (priLOSEC) CR capsule 20 mg (20 mg Oral Given 8/1/20 1035)   oxyCODONE  (ROXICODONE) tablet 5 mg (5 mg Oral Given 8/1/20 1239)   potassium chloride ER (KLOR-CON M) CR tablet 40 mEq (40 mEq Oral Given 8/1/20 1317)       Drips running?  K drip    Home pump  No    Current LDAs  Peripheral IV 08/01/20 Left Upper forearm (Active)   Site Assessment WDL 08/01/20 1242   Number of days: 0       Gastrostomy/Enterostomy Jejunostomy LUQ 1 16 fr  (Active)   Number of days: 911       Labs results:   Labs Ordered and Resulted from Time of ED Arrival Up to the Time of Departure from the ED   CBC WITH PLATELETS DIFFERENTIAL - Abnormal; Notable for the following components:       Result Value    RBC Count 2.83 (*)     Hemoglobin 11.2 (*)     Hematocrit 32.0 (*)      (*)     MCH 39.6 (*)     RDW 16.1 (*)     Absolute Lymphocytes 0.6 (*)     All other components within normal limits   COMPREHENSIVE METABOLIC PANEL - Abnormal; Notable for the following components:    Sodium 130 (*)     Potassium 8.1 (*)     Chloride 93 (*)     Glucose 52 (*)     Bilirubin Total 2.5 (*)     Albumin 2.4 (*)     Alkaline Phosphatase 386 (*)     ALT 83 (*)     All other components within normal limits   LIPASE - Abnormal; Notable for the following components:    Lipase <10 (*)     All other components within normal limits   UA MACROSCOPIC WITH REFLEX TO MICRO AND CULTURE - Abnormal; Notable for the following components:    Ketones Urine 40 (*)     Protein Albumin Urine 30 (*)     Leukocyte Esterase Urine Trace (*)     Bacteria Urine Few (*)     Hyaline Casts 4 (*)     All other components within normal limits   COMPREHENSIVE METABOLIC PANEL - Abnormal; Notable for the following components:    Potassium 2.5 (*)     Chloride 93 (*)     Glucose 52 (*)     Bilirubin Total 1.9 (*)     Albumin 2.5 (*)     Protein Total 6.6 (*)     Alkaline Phosphatase 408 (*)     ALT 65 (*)     AST 76 (*)     All other components within normal limits   TROPONIN I - Abnormal; Notable for the following components:    Troponin I ES 0.062 (*)      All other components within normal limits   LACTIC ACID WHOLE BLOOD   TROPONIN I   TSH WITH FREE T4 REFLEX   COVID-19 VIRUS (CORONAVIRUS) BY PCR       Imaging Studies:   Recent Results (from the past 24 hour(s))   XR Ankle Left G/E 3 Views    Narrative    3 views left ankle radiographs 8/1/2020 11:25 AM    History: fall    Comparison: None    Findings:    Nonweightbearing AP, oblique, and lateral  views of the left ankle  were obtained.     No acute osseous abnormality.      Ankle mortise and syndesmosis are congruent on this non-weight bearing  study. Bones appear osteopenic.    Mild diffuse soft tissue prominence.      Impression    Impression:    No acute osseous abnormality.    MORIS CLEMENS MD (Joe)   Head CT w/o contrast    Narrative    CT HEAD W/O CONTRAST 8/1/2020 11:21 AM    History: Fall hit back of head.    Per EPIC: Complaining of?a?fall and generalized weakness. Patient  reports she fell at approximately 0830 am?,?when her legs buckled out  from underneath her, hitting the back of her head on the carpet.?    Comparison: No similar previous study.    Technique: Using multidetector thin collimation helical acquisition  technique, axial, coronal and sagittal CT images from the skull base  to the vertex were obtained without intravenous contrast.    Findings:     No intracranial hemorrhage, mass effect, or midline shift. Gray/white  matter differentiation in both cerebral hemispheres is preserved.  Moderate parenchymal volume loss of the frontal and temporal lobes.  Patchy periventricular hypoattenuation, most likely represents chronic  small vessel ischemic disease. Ventricles are proportionate to the  cerebral sulci. The basal cisterns are clear.    The bony calvaria and the bones of the skull base are normal. The  visualized portions of the paranasal sinuses and mastoid air cells are  clear. Right parietal scalp edema.      Impression    Impression:  1. No acute intracranial pathology.   2.  Frontotemporal cerebral atrophy. Mild chronic small vessel ischemic  disease.    I have personally reviewed the examination and initial interpretation  and I agree with the findings.    WILLIAM HEDRICK MD   Cervical spine CT w/o contrast    Narrative    CT CERVICAL SPINE W/O CONTRAST 8/1/2020 11:36 AM    Provided History: Fall posterior neck pain    Comparison: No similar previous study. Chest CT 11/1/2017.    Technique: Using multidetector thin collimation helical acquisition  technique, axial, coronal and sagittal CT images through the cervical  spine were obtained without intravenous contrast.     Findings:  No acute fracture or traumatic subluxation. No prevertebral edema.  Straightening of cervical lordosis. Degenerative grade 1  anterolisthesis C4 over C5 and C7 over T1. Disc space narrowing and  endplate degenerative changes at C5-T1. Multilevel uncovertebral  spurring and facet arthropathy. Multilevel cervical spondylosis, most  pronounced with left C3-4 severe neural foraminal narrowing. No  high-grade spinal canal narrowing at any level. No abnormality of the  paraspinous soft tissues.    Atherosclerotic calcification of both carotid bifurcations.    Partially visualized anterior mediastinal esophageal conduit.      Impression    Impression:   1. No acute fracture or traumatic subluxation.  2. Multilevel cervical spondylosis, most pronounced with left C3-4  severe neural foraminal stenosis. No high-grade spinal canal stenosis  at any level.     I have personally reviewed the examination and initial interpretation  and I agree with the findings.    WILLIAM HEDRICK MD       Recent vital signs:   /81   Pulse 89   Temp 97.9  F (36.6  C) (Oral)   Resp 28   Ht 1.524 m (5')   Wt 42.2 kg (93 lb)   SpO2 97%   BMI 18.16 kg/m      [place] Coma Scale Score: 15 (08/01/20 0947)       Cardiac Rhythm: Normal Sinus  Pt needs tele? Yes  Skin/wound Issues: None    Code Status: DNR / DNI    Pain control:  fair    Nausea control: good    Abnormal labs/tests/findings requiring intervention: see epic    Family present during ED course? No   Family Comments/Social Situation comments: PT/OT, nutrition consult    Tasks needing completion: recheck on K    Khushi Aguilar RN  asc -- [facility] ED  [phone] [place] [facility]  [phone] [facility] ED

## 2020-08-02 NOTE — PLAN OF CARE
5A OT    Discharge Planner OT   Patient plan for discharge: Hoping for home  Current status: Evaluation completed, treatment initiated. Pt reporting fatigue following PT session, though agreeable to OT. Pt with word finding difficulty and some delayed responses during evaluation, question fatigue vs cognition; will further assess. Pt lives alone in IND living, buys groceries for delivery, drives, had little daily assist from family. Pt ambulated x10 ft toilet > bed with FWW, steady on her feet. Pt sat EOB x30 min for evaluation questions and to complete g/h ADLs with set up assist and supervision. Pt declining further OOB activity. Pt educated in importance of activity to maintain/build strength in hospital, encouraged to sit in bedside chair for meals and to ambulate with RN in evening.   Barriers to return to prior living situation: medical status, weakness, activity tolerance  Recommendations for discharge: Home with assist and [facility] OT  Rationale for recommendations: Pt fatigued following PT, limited mobility observed during OT evaluation. Pt is below baseline for ADL IND, will benefit from continued IP OT to progress strength and endurance for ADL activity. Will continue to update discharge recommendations as pt participates with therapy.        Entered by: Delphine Montes 08/02/2020 3:43 PM

## 2020-08-02 NOTE — PHARMACY-ADMISSION MEDICATION HISTORY
Admission Medication History Completed by Pharmacy    See [facility] Admission Navigator for allergy information, preferred outpatient pharmacy, prior to admission medications and immunization status.     Medication History Sources:     Dispense Report, Patient    Changes made to PTA medication list (reason):    Added:     Doxepin (per patient, consistent with dispense report)    Vitamin D2 (per patient, consistent with dispense report)    Omeprazole (per patient, consistent with dispense report)    Tizanidine (per patient, consistent with dispense report)    Deleted:     Atorvastatin 80 mg HS (per patient, no longer taking)    Benzonatate 100 mg TID prn cough (per patient, no longer taking)    Calcium Carb-Cholecalciferol - 1 TID (per patient, no longer taking)    Duloxetine 30 mg daily (per patient, no longer taking)    Ferrous sulfate 325 mg daily (per patient, no longer taking)    Hydromorphone 2 mg BID prn (per patient, no longer taking)    Lidocaine 5% patch BID (per patient, no longer taking)    Magnesium Oxide 400 mg BID (per patient, no longer taking)    Medical cannabis (per patient, no longer taking)    Nutritional supplements (per patient, no longer taking)    Polyethylene glycol 17g packet daily prn (per patient, no longer taking)    Ranitidine 75 mg BID (per patient, no longer taking)    Senna-docusate 8.6-50 mg BID (per patient, no longer taking)    Tramadol 25 mg 4 times daily prn (per patient, no longer taking)    Trazodone 150 mg HS (per patient, no longer taking)    Changed:     Furosemide - 40 mg BID >>> 40 mg QAM    Gabapentin - 400 mg BID >>> 400 mg at bedtime    Metoprolol succinate - 25 mg daily >>> 12.5 mg (half tab) daily    Multivitamin - Tablet >>> chewable    Additional Information:    Patient was a reliable medication historian. She knew all of her dosing times - doses and frequency were consistent with dispense report. Patient was given a range on her doxepin but takes 2 capsules (20 mg)  per night because higher doses gave her hallucinations. Patient prefers a chewable multivitamin.    Prior to Admission medications    Medication Sig Last Dose Taking? Auth Provider   Acetaminophen (TYLENOL EXTRA STRENGTH PO) Take 1,000 mg by mouth 3 times daily And 500 mg by mouth 2 times daily as needed 7/31/2020 at PM Yes Reported, Patient   aspirin 81 MG EC tablet Take 81 mg by mouth daily 7/31/2020 Yes Reported, Patient   doxepin (SINEQUAN) 10 MG capsule Take 10-50 mg by mouth At Bedtime 7/31/2020 at PM Yes Unknown, Entered By History   furosemide (LASIX) 40 MG tablet Take 40 mg by mouth every morning 7/31/2020 at AM Yes Unknown, Entered By History   gabapentin (NEURONTIN) 100 MG capsule Take 400 mg by mouth At Bedtime 7/31/2020 at PM Yes Unknown, Entered By History   loperamide (IMODIUM A-D) 2 MG tablet Take 2 mg by mouth daily as needed Give 4mg with first loose stool AND Give 2 mg by mouth as needed for Loose Stools 2 mg with each subsequent loose stool episode after initial 4 mg first dose. NOT TO EXCEED 16 MG IN 24 7/31/2020 Yes Reported, Patient   MELATONIN PO Take 10 mg by mouth At Bedtime  7/31/2020 at PM Yes Reported, Patient   metoprolol succinate ER (TOPROL-XL) 25 MG 24 hr tablet Take 12.5 mg by mouth every morning 7/31/2020 at AM Yes Unknown, Entered By History   omeprazole (PRILOSEC) 20 MG DR capsule Take 1 capsule by mouth 3 times daily before meals and at bedtime as needed. 7/31/2020 Yes Unknown, Entered By History   Pediatric Multivit-Minerals-C (CHEWABLES MULTIVITAMIN PO) Take 1 tablet by mouth 7/31/2020 Yes Unknown, Entered By History   tiZANidine (ZANAFLEX) 4 MG tablet Take 4 mg by mouth At Bedtime 7/31/2020 at PM Yes Unknown, Entered By History   vitamin D2 (ERGOCALCIFEROL) 10722 units (1250 mcg) capsule Take 50,000 Units by mouth once a week 7/26/2020 Yes Unknown, Entered By History   OTHER MEDICAL SUPPLIES every morning Daily weights. Unknown at Unknown time  Reported, Patient   OTHER  MEDICAL SUPPLIES 4 times daily BP checks qid. Unknown at Unknown time  Reported, Patient   OTHER MEDICAL SUPPLIES Legs: Black socks and Compriflex LEs. Thigh high wraps. Leave on at all times. If soiled may remove thigh portions.    every shift Unknown at Unknown time  Reported, Patient       Date completed: 08/01/20    Medication history completed by: Jeronimo Godinez

## 2020-08-02 NOTE — PROGRESS NOTES
"   08/02/20 1339   Quick Adds   Type of Visit Initial PT Evaluation   Living Environment   Lives With alone   Living Arrangements independent living facility   Home Accessibility no concerns   Transportation Anticipated car, drives self   Living Environment Comment Pt reports living at IND living facility for seniors, facility is fully accessible, no concerns.    Self-Care   Usual Activity Tolerance moderate   Current Activity Tolerance fair   Regular Exercise No   Equipment Currently Used at Home cane, quad;walker, standard;walker, rolling;shower chair;grab bar, tub/shower;grab bar, toilet   Activity/Exercise/Self-Care Comment Pt reports being MOD IND for most daily activiites, uses a deliver service for groceries   Functional Level Prior   Ambulation 1-->assistive equipment   Transferring 1-->assistive equipment   Toileting 0-->independent   Bathing 1-->assistive equipment   Communication 0-->understands/communicates without difficulty   Swallowing 0-->swallows foods/liquids without difficulty   Cognition 0 - no cognition issues reported   Fall history within last six months yes   Number of times patient has fallen within last six months 1   Which of the above functional risks had a recent onset or change? ambulation;transferring;bathing;toileting   Prior Functional Level Comment Pt reports she uses 4WW for daily mobility tasks. Pt is IND in daily cares.   General Information   Onset of Illness/Injury or Date of Surgery - Date 08/01/20   Referring Physician Thad Mackenzie PA-C    Patient/Family Goals Statement \"Get legs stronger\"   Pertinent History of Current Problem (include personal factors and/or comorbidities that impact the POC) Per chart: Minerva Blanco is a 75 year old female admitted on 8/1/2020. She has a history of breast cancer, CAD s/p PCI (4/2018), PAF, CHF, HTN, HLD, MS, fibromyalgia, IBS, GERD, esophageal perforation, and malnutrition who presents with progressive generalized weakness x " 3-4 weeks, culminating in fall at home today without significant injury.  Noted to be hypokalemic on evaluation (K 2.5).  Admitted for further evaluation and management.    Precautions/Limitations fall precautions   General Observations PIV   General Info Comments Activity: Up with Assist   Cognitive Status Examination   Orientation orientation to person, place and time   Level of Consciousness alert   Follows Commands and Answers Questions 100% of the time   Personal Safety and Judgment intact   Memory intact   Cognitive Comment Pt alert and orientated. At times had troubled finding words, appeared flustered   Pain Assessment   Patient Currently in Pain No   Integumentary/Edema   Integumentary/Edema no deficits were identifed   Posture    Posture Kyphosis;Forward head position   Range of Motion (ROM)   ROM Comment WFL   Strength   Strength Comments BLE >3+/5 per observation   Bed Mobility   Bed Mobility Comments Pt required Min A for supine<>sitting EOB   Transfer Skills   Transfer Comments Pt completes sit<>stand w/CGA   Gait   Gait Comments Pt ambulates with CGA   Sensory Examination   Sensory Perception Comments denies deficits   General Therapy Interventions   Planned Therapy Interventions bed mobility training;gait training;neuromuscular re-education;strengthening;transfer training;home program guidelines;progressive activity/exercise   Clinical Impression   Criteria for Skilled Therapeutic Intervention yes, treatment indicated   PT Diagnosis Impaired functional mobility & activity tolerance   Influenced by the following impairments Fatigue, weakness   Functional limitations due to impairments Inability to be IND in gait, bed mobility, transfers   Clinical Presentation Stable/Uncomplicated   Clinical Presentation Rationale Pt presentation, clinical expertise   Clinical Decision Making (Complexity) Low complexity   Therapy Frequency 6x/week   Predicted Duration of Therapy Intervention (days/wks) 1-2 weeks  "  Anticipated Discharge Disposition Home with Home Therapy   Risk & Benefits of therapy have been explained Yes   Patient, Family & other staff in agreement with plan of care Yes   Clinical Impression Comments  Pt appears to be progress back toward baseline, would benefit from HHPT to improve activity tolerance and functional mobility.    Health system-PAC TM \"6 Clicks\"   2016, Trustees of [facility], under license to [employer].  All rights reserved.   6 Clicks Short Forms Basic Mobility Inpatient Short Form   [facility] AM-PAC  \"6 Clicks\" V.2 Basic Mobility Inpatient Short Form   1. Turning from your back to your side while in a flat bed without using bedrails? 4 - None   2. Moving from lying on your back to sitting on the side of a flat bed without using bedrails? 3 - A Little   3. Moving to and from a bed to a chair (including a wheelchair)? 3 - A Little   4. Standing up from a chair using your arms (e.g., wheelchair, or bedside chair)? 3 - A Little   5. To walk in hospital room? 3 - A Little   6. Climbing 3-5 steps with a railing? 2 - A Lot   Basic Mobility Raw Score (Score out of 24.Lower scores equate to lower levels of function) 18   Total Evaluation Time   Total Evaluation Time (Minutes) 8     "

## 2020-08-02 NOTE — PROGRESS NOTES
[facility], [facility] Progress Note - Hospitalist Service, Gold 6       Date of Admission:  8/1/2020  Assessment & Plan   Minerva Blanco is a 75 year old female admitted on 8/1/2020. She has a history of breast cancer, CAD s/p PCI (4/2018), PAF, CHF, HTN, HLD, MS, fibromyalgia, IBS, GERD, esophageal perforation, and malnutrition who presents with progressive generalized weakness x 3-4 weeks, culminating in fall     # Hypokalemia, resolved:    Suspect secondary to loop diuretics and low dietary K.  No EKG findings.    - Contnue to hold diuretics  - Continue electrolyte replacement protocol    # Progressive generalized weakness with fall:  Suffered unwitnessed fall at home with closed head injury but no LOC.  CT head and neck negative.  Etiology of weakness unclear.  Onset 3-4 weeks ago with steady progression since then.  No focal weakness or paresthesias.  Suspect malnutrition, volume depletion, and deconditioning contributing.  Cannot r/o nutritional deficiencies.  Symptoms not c/w prior MS exacerbations per patient.  TSH normal.  Patient notes increased OSORIO in past 2-3 days.  ? CHF, valvular disease or pHTN contributing to exercise intolerance.  No recent echo on file.  No focal neurologic deficits on exam.  CT head negative for hemorrhage or infarct.  Afebrile.  WBC normal.  No infectious symptoms.  UA and CXR negative.  COVID negative  - PT/OT consults  - Nutrition consult  - Check vitamin B1 (pending), B12 (elevated), and D levels (pending)  - ECHO stable, does have mild pHTN  - Can discontinue tele    # Dysphagia:  She is describing increasing dysphagia alsong with belching and worsening heart burn symptoms. Exacerbated byt certain foods, jace spicy, carbonated, and acidic. Has complex esophgeal history  - Order upper GI with small bowel follow through  - Consider thoracic/GI consult depending on results     # Abnormal LFTs:  Etiology unclear.  Obstructive pattern,  stable overnight  - US abdomen (pending)  - Repeat LFTs in AM  - Avoid hepatotoxic agents     # Hypoglycemia:  Suspect d/t poor PO intake.  Glucose 50's on arrival.  Improved to 90's.  - Monitor     # Chronic macrocytic anemia:  Baseline Hgb ~11.  No evidence of recent or active bleeding.  Head CT negative for hemorrhage.    - Repeat CBC in AM     # Type II demand  # Hx CAD, HFpEF, and PAF:  Hx inferior MI s/p PCI and stenting of RCA x 2 in 2018.  Trop tredned up overnigth w/o changes in EKG negative for ischemic changes.  An ECHO this AM is normal EF w/o focal changes, there is some TR and mild pHTN  - Continue ASA 81mg daily and metoprolol XL 25mg daily  - Hold lasix for now given recent falls and hypokalemia, follow volume status  - Daily weights     # Hx Multiple sclerosis:  Has been quiescent for years.  Reports current symptoms not c/w prior MS flares.    - Consider neurology consultation if above work up negative and symptoms persist     # Hx Fibromyalgia:  Patient reports chronic pain in neck, shoulders, hips and knees.  ? Possible PMR.  Could consider rheumatology consult.       Diet: Combination Diet Regular Diet Adult    DVT Prophylaxis: Enoxaparin (Lovenox) subcutaneous, if mobility improves, will consider stopping but given sendentary, start in hospital  Whitt Catheter: not present  Code Status: DNR/DNI         Disposition Plan   Expected discharge: 2 - 3 days, recommended to await PT/OT once safe disposition plan/ TCU bed available.  Entered: Jd Zambrano MD 08/02/2020, 8:51 AM       The patient's care was discussed with the Bedside Nurse and Patient.    Jd Zambrano MD  Hospitalist Service, [address], [place]  Pager: [phone]  Please see sticky note for cross cover information  ______________________________________________________________________    Interval History   Patient with poor sleep overnight but feeling better. Still with lots of  belching, nausea. Has some oral K replacement, this seemed to cause some diarrhea. No abn pina, no sig SOB or coughing. Feeling like able to tolerate more food and fluids.     Data reviewed today: I reviewed all medications, new labs and imaging results over the last 24 hours. I personally reviewed no images or EKG's today.    Physical Exam   Vital Signs: Temp: 98.5  F (36.9  C) Temp src: Oral BP: 115/63 Pulse: 80 Heart Rate: 88 Resp: 20 SpO2: 97 % O2 Device: None (Room air)    Weight: 92 lbs 14.4 oz  Lying in BED, NAD  Lungs clear  Heart reg, no mumrus  Abn is soft, non ttp  LE w/o edema  Non-focal neuro exam, + diffuse weakness    Data   Reviewed

## 2020-08-02 NOTE — PLAN OF CARE
5A    Discharge Planner PT   Patient plan for discharge: Return to IND living facilty  Current status: Evaluation completed, treatment initiated. Pt required Min A for bed mobility. Performed sit<>stand with and gait with CGA and FWW. Pt ambulated ~100', reports mild SOB, SPO2 at 94% on RA. VSS throughtout session.  Barriers to return to prior living situation: Medical condition, fatigue/weakness  Recommendations for discharge: Return to senior IND living facility + HHPT  Rationale for recommendations: Pt appears to be progress back toward baseline, would benefit from HHPT to improve activity tolerance and functional mobility.        Entered by: Gage Page 08/02/2020 2:12 PM

## 2020-08-02 NOTE — PLAN OF CARE
Alert and oriented x 4. Complained of pain in the ankle. Tylenol given. Stated that she had slight shortness of breath but declined offer for O2. Oxygen saturation 98%. NS infusing at 75 ml/hr. Up to the BSC with walker, gait belt and assist of 1. Call light in reach and able to make needs known. Telemetry on.

## 2020-08-03 NOTE — PROGRESS NOTES
Care Coordinator Progress Note    Admission Date/Time:  8/1/2020  Attending MD:  Jd Zambrano*    Data  Patient was admitted for:    Weakness  Hypokalemia  Hypoglycemia  Fall, initial encounter  2019-nCoV not detected.    Concerns with insurance coverage for discharge needs: None.  Current Living Situation: Patient lives alone in an independent living facility for seniors, uses 4WW for daily mobility tasks. Pt is IND in daily cares, reports using shower chair when bathing. Drives herself.  Support System: Supportive and Involved  Services Involved: none  Transportation at Discharge: Family or friend will provide  Transportation to Medical Appointments:  - Name of caregiver: self, minimal assist from family  Barriers to Discharge: medical needs      Coordination of Care   D: Plan of care report received from Art Ho MD today, possible patient will be ready to dc tomorrow vs Wednesday 2/2 any interventions GI Team would like to take.    I/A: Chart reviewed; patient was open to [facility] and [facility] in 2017, currently not open to any community services.     OT recs: Home with assist for IADL's as needed with HHOT  Rationale for recommendations: Pt will benefit from HHOT as pt is pt requires assistive device, assistance from an additional person, and would require taxing amount of energy to leave home environment, therefore appropriate for HH therapy.    PT recs: Return to senior IND living facility + HHPT  Rationale for recommendations: Pt appears to be progress back toward baseline, would benefit from HHPT to improve activity tolerance and functional mobility.    Spoke with patient to confirm living arrangements and discharge transportation. Pt was offered Medicare Compare list for Home Care which she did not want. Patient stated she wanted [facility]. They did a very good job when I was home with my broke back. Orders completed for RN PT OT, referral sent by fax.    P: Care Coordination will continue to  follow, please call or page should new needs arise.    Referral:  [facility]  [address]  [address]  [place]  [address]  Ph: [phone]  Fax: [phone]     RN skilled nursing visit.   RN to assess vital signs and weight, respiratory and cardiac status, pain level and activity tolerance, hydration, nutrition and bowel status   RN to complete home safety evaluation.  RN to complete weekly medication management and set-up.     Physical Therapy to evaluate and treat  Occupational Therapy to evaluate and treat      Plan  Anticipated Discharge Date:  1-2 days  Anticipated Discharge Plan:  Home w/ home care      Sadie Serrano RN, BSN, PHN  Care Coordinator  [facility]  Direct phone: [phone]  Pager: [phone]    To contact the on-call Weekend Care Coordination Team please page [phone]

## 2020-08-03 NOTE — PLAN OF CARE
5A OT    Discharge Planner OT   Patient plan for discharge: Hoping for home  Current status: Pt completed supine > sit IND. SBA for sit <> stand with FWW. Pt stood to complete g/h tasks with SBA and set-up.  OT administered MOCA to assess cog function. Pt scored 26/30 indicating normal cog function however did note mild memory deficits throughout session, i.e pt telling therapist multiple times she had CT scan overnight however was appropriate throughout session.   Barriers to return to prior living situation: medical status, weakness, activity tolerance  Recommendations for discharge: Home with assist for IADL's as needed with HHOT  Rationale for recommendations: Pt will benefit from HHOT as pt is pt requires assistive device, assistance from an additional person, and would require taxing amount of energy to leave home environment, therefore appropriate for HH therapy.         Entered by: Blessing Schneider 08/03/2020 8:49 AM

## 2020-08-03 NOTE — PLAN OF CARE
5A    Discharge Planner PT   Patient plan for discharge: Home with HHPT  Current status: Pt completes sit<>stand with SBA to 4WW. Pt able to ambulate ~300' with 4WW and CGA-SBA, no instability or LOB observed. Pt reports feeling steady on feet, mild SOB upon completion of gait. VSS throughout session.   Barriers to return to prior living situation: Medical condition  Recommendations for discharge: Home with HHPT  Rationale for recommendations: Pt progressing toward baseline level of function, would benefit from additional PT services to increase activity tolerance and LE strength.        Entered by: Gage Page 08/03/2020 4:13 PM

## 2020-08-03 NOTE — PLAN OF CARE
Pt A/Ox4, VSS on RA. Pt worked with PT and OT today. Pt walked with A1 WGB, no new deficits noticed. Pt C/O headache throughout the day. PRN tylenol and oxy given when available. L PIV currently SL, IVMF discontinued today. Pt had ECHO, showed mild pulm HTN, mild tricuspid insufficiency and EF of 60-65%. COVID 19 came juli NEGATIVE. Tele discontinued. Plan is for upper GI XR tomorrow, and nutrition consult to investigate pts malnutrition

## 2020-08-03 NOTE — PROGRESS NOTES
[facility], [facility] Progress Note - Hospitalist Service, Gold 6       Date of Admission:  8/1/2020  Assessment & Plan   Minerva Blanco is a 75 year old female admitted on 8/1/2020. She has a history of breast cancer, CAD s/p PCI (4/2018), PAF, CHF, HTN, HLD, MS, fibromyalgia, IBS, GERD, esophageal perforation, and malnutrition who presents with progressive generalized weakness x 3-4 weeks, culminating in fall     # Hypokalemia, resolved:    Suspect secondary to loop diuretics and low dietary K.  No EKG findings.    - Contnue to hold diuretics  - Continue electrolyte replacement protocol    # Progressive generalized weakness with fall:  Suffered unwitnessed fall at home with closed head injury but no LOC.  CT head and neck negative.  Etiology of weakness unclear.  Onset 3-4 weeks ago with steady progression since then.  No focal weakness or paresthesias.  Suspect malnutrition, volume depletion, and deconditioning contributing.  Cannot r/o nutritional deficiencies.  Symptoms not c/w prior MS exacerbations per patient.  TSH normal.  Patient notes increased OSORIO in past 2-3 days.  ? CHF, valvular disease or pHTN contributing to exercise intolerance.  No recent echo on file.  No focal neurologic deficits on exam.  CT head negative for hemorrhage or infarct.  Afebrile.  WBC normal.  No infectious symptoms.  UA and CXR negative.  COVID negative  - PT/OT consults --> Recommending home with home services  - Nutrition consult --> Recommending feeding tube for severe malnutrition, she is resistant to this until we establish diagnosis and cause of current symptoms  - Check vitamin B1 (pending), B12 (elevated), and D levels (normal)  - ECHO stable, does have mild pHTN  - Can discontinue tele    # Dysphagia:  # Severe malnutrition in setting of acute medical illness  She is describing increasing dysphagia alsong with belching and worsening heart burn symptoms. Exacerbated byt certain  foods, jace spicy, carbonated, and acidic. Has complex esophgeal history  - Upper GI pending  - GI consult today     # Abnormal LFTs:  Etiology unclear.  Obstructive pattern, stable overnight. Has marked CBD dilation on ultrasound. Has been noted previously, uncertain on cause or etiology. MRCP done in [facility] [facility] system in June 2019.    - GI consult as above  - Repeat LFTs in AM  - Avoid hepatotoxic agents     # Hypoglycemia:  Suspect d/t poor PO intake.  Glucose 50's on arrival.  Improved to 90's.  - Monitor     # Chronic macrocytic anemia:  Baseline Hgb ~11.  No evidence of recent or active bleeding.  Head CT negative for hemorrhage.    - Repeat CBC in AM     # Type II demand MI  # Hx CAD, HFpEF, and PAF:  Hx inferior MI s/p PCI and stenting of RCA x 2 in 2018.  Trop tredned up overnigth w/o changes in EKG negative for ischemic changes.  An ECHO this admission with normal EF w/o focal changes, there is some TR and mild pHTN  - Continue ASA 81mg daily and metoprolol XL 25mg daily  - Continue to hold lasix for now given recent falls and hypokalemia, follow volume status  - Daily weights     # Hx Multiple sclerosis:  Has been quiescent for years.  Reports current symptoms not c/w prior MS flares.    - Consider neurology consultation if above work up negative and symptoms persist     # Hx Fibromyalgia:  Patient reports chronic pain in neck, shoulders, hips and knees.  ? Possible PMR.  Could consider rheumatology consult.    # Hisotry of compression fracture  # Osteopenia  At risk for osteoporosus and given compression fracture in last year, likely has osteoporosis. Last DEXA in 2015. On TID omep which is not helping with this issue. Shoud have repeat DEXA arranged.        Diet: Combination Diet Regular Diet Adult    DVT Prophylaxis: Enoxaparin (Lovenox) subcutaneous, if mobility improves, will consider stopping but given sendentary, start in hospital  Whitt Catheter: not present  Code Status: DNR/DNI          Disposition Plan   Expected discharge: 2 - 3 days, recommended to home with home services include OT and PT once we have compelted work-up for dysphagia or establsihed dmitriy for continued outpatient evaluation.  Entered: Jd Zambrano MD 08/03/2020, 9:27 AM       The patient's care was discussed with the Bedside Nurse and Patient.    Jd Zambrano MD  Hospitalist Service, [address], [place]  Pager: [phone]  Please see sticky note for cross cover information  ______________________________________________________________________    Interval History   Patient with poor sleep, abn ultasound was done around midnight. Still with lots of belching, nausea. No abn pina, no sig SOB or coughing. Feeling like able to tolerate more food and fluids. COntinues to have pain on left side of chest, retrocardiac area with swallowing     Data reviewed today: I reviewed all medications, new labs and imaging results over the last 24 hours. I personally reviewed no images or EKG's today.    Physical Exam   Vital Signs: Temp: 97.5  F (36.4  C) Temp src: Oral BP: 122/66 Pulse: 92 Heart Rate: 88 Resp: 16 SpO2: 97 % O2 Device: None (Room air)    Weight: 92 lbs 14.4 oz  Lying in BED, NAD  Lungs clear  Heart reg, no mumrus  Abn is soft, non ttp  LE w/o edema  Non-focal neuro exam, + diffuse weakness    Data   Reviewed

## 2020-08-03 NOTE — PLAN OF CARE
"/66 (BP Location: Left arm)   Pulse 92   Temp 97.5  F (36.4  C) (Oral)   Resp 16   Ht 1.524 m (5')   Wt 42.1 kg (92 lb 14.4 oz)   SpO2 97%   BMI 18.14 kg/m      Time 8608-0061      Reason for admission: Hypokalemia, Weakness, Hypoglycemia, Fall  Vitals: VSS on RA  Activity: Up SBA w/ walker  Pain: c/o neck, ankle pain, managed with PO oxy x1 and tylenol x1  Neuro: A&Ox4, speech logical  Mood/Behavior: calm, cooperative  Cardiac: RRR, no edema BLE  Respiratory: LS clear  GI/: No BM this shift, voiding without difficulty  Diet:Regular  Lines: PIV  Skin: CDI  Labs/imaging: Potassium 4.0, Magnesium 1.7      New changes this shift: Pt had upper GI xray w/ small bowel follow-through this morning. Awaiting GI consult. Mag replaced per \"high\" replacement protocol.      Plan: Possible discharge tomorrow. Continue to monitor and follow POC.           "

## 2020-08-04 NOTE — CONSULTS
"    Thoracic Surgery Consultation    Minerva Blanco  MRN#: [id number]    Date of Admission:  8/1/2020    Date of Consult: 8/4/2020    Reason for consult: Progressive dysphagia       Requesting service: Gold       Requesting provider: Dr. Ruslan Leblanc                   Assessment and Plan:   Assessment:    75 year old female with PMH breast cancer, CAD s/p PCI, PAF, CHF, HTN, MS, fibromyalgia, IBS, GERD, hx lap hiatal hernia with Toupet c/b mediastinal phlegmon s/p esophageal stents, s/p esophagectomy with spit fistula creation (1/2017), s/p retrosternal gastric pullup (4/2017) c/b esophagocutaneous fistula s/p cauterizaion of fistula (11/2017) s/p esophageal stent removal 2/2018 and resolution of her fistula. She is admitted this hospitalization with weakness and failure to thrive. Thoracic consulted for concern for progressive dysphagia and possible stricture. Patient reports 3 weeks of poor oral intake and reflux symptoms. Upper GI with SBFT concerning for possible midthoracic esophageal stricture.         Plan:   - No indication for acute surgical intervention  - Recommend XR esophagram    Discussed with Fellow Dr. Beth.    Samira Wolff,   General Surgery, PGY3              Chief Complaint:   Dysphagia         History of Present Illness:   Minerva Blanco is a 75 year old female with PMH breast cancer, CAD s/p PCI, PAF, CHF, HTN, MS, fibromyalgia, IBS, GERD, hx lap hiatal hernia with Toupet c/b mediastinal phlegmon s/p esophageal stents, s/p esophagectomy with spit fistula creation (1/2017), s/p retrosternal gastric pullup (4/2017) c/b esophagocutaneous fistula s/p cauterizaion of fistula (11/2017) s/p esophageal stent removal 2/2018 and resolution of her fistula. She is admitted this hospitalization with weakness and failure to thrive. Thoracic consulted for concern for progressive dysphagia and possible stricture.     Patient reports she had a \"voracious\" appetite up until 3 weeks ago. Had been " tolerating regular food without issue. Does have long-standing reflux for which she takes a PPI prior to meals three times daily. She notes that certain foods aggravate her reflux such as acidic foods, carbonated beverages, and spicy foods so she tries to avoid these. Symptoms had been tolerable however over the last 3 weeks she has been extremely fatigues and has had no appetite. Denies nausea. Does report that within the same three weeks she has had increased burping throughout the day. She has a burning/stinging sensation in her chest with her reflux as certain foods go down. She also endorses sensation of spit refluxing in her throat coming up from her stomach. Occasional abdominal bloating with certain meals.  Denies any feelings of food getting stuck in her throat. tolerates breads and solid foods without issue. Denies any trouble swallowing. Denies any sensation of choking with drinking liquids. Having normal BMs for her every other day in the setting of her IBS and taking imodium.     Today she reports her appetite is improved and back to eating well. Energy is also improved. On admit noted to have abnormal LFTs, hypokalemia, troponemia, and Type II demand MI. Albumin 2.4. Upper GI with SBFT concerning for possible midthoracic esophageal stricture.            Past Medical History:     Past Medical History:   Diagnosis Date     Breast cancer (H) 2007    right side - lumpectomy, chemo, and local radiation     Chronic infection     infection/wound for two years after esoph surgery     Compression fracture of spine (H)     T12-L1     Coronary artery disease 04/2018    s/p PCI with ADOLFO to proximal and mid RCA     Esophageal perforation      Fibromyalgia      GERD (gastroesophageal reflux disease)      Hiatal hernia      IBS (irritable bowel syndrome)      Meniere's disease     deaf left ear     Multiple sclerosis (H)      Noninfectious ileitis      Other chronic pain      Paroxysmal atrial fibrillation (H)               Past Surgical History:     Past Surgical History:   Procedure Laterality Date     APPENDECTOMY       BACK SURGERY  5/1/2015    lumbar fusion     BRONCHOSCOPY FLEXIBLE N/A 4/17/2017    Procedure: BRONCHOSCOPY FLEXIBLE;;  Surgeon: Jens Wise MD;  [facility]     C GASTROSTOMY/JEJUN TUBE      J tube for feedings     CLOSE SPIT FISTULA N/A 4/17/2017    Procedure: CLOSE SPIT FISTULA;;  Surgeon: Jens Wise MD;  [facility]     COMBINED ESOPHAGOSCOPY, GASTROSCOPY, DUODENOSCOPY (EGD), REMOVE ESOPHAGEAL STENT N/A 8/26/2016    Procedure: COMBINED ESOPHAGOSCOPY, GASTROSCOPY, DUODENOSCOPY (EGD), REMOVE ESOPHAGEAL STENT;  Surgeon: Jens Wise MD;  [facility]     COMBINED ESOPHAGOSCOPY, GASTROSCOPY, DUODENOSCOPY (EGD), REMOVE ESOPHAGEAL STENT N/A 2/12/2018    Procedure: COMBINED ESOPHAGOSCOPY, GASTROSCOPY, DUODENOSCOPY (EGD), REMOVE ESOPHAGEAL STENT;  Esophagogastroduodenoscopy With Stent Removal;  Surgeon: Jens Wise MD;  [facility]     COMBINED ESOPHAGOSCOPY, GASTROSCOPY, DUODENOSCOPY (EGD), REPLACE ESOPHAGEAL STENT N/A 8/25/2017    Procedure: COMBINED ESOPHAGOSCOPY, GASTROSCOPY, DUODENOSCOPY (EGD), REPLACE ESOPHAGEAL STENT;;  Surgeon: Jens Wise MD;  [facility]     COMBINED ESOPHAGOSCOPY, GASTROSCOPY, DUODENOSCOPY (EGD), REPLACE ESOPHAGEAL STENT N/A 9/15/2017    Procedure: COMBINED ESOPHAGOSCOPY, GASTROSCOPY, DUODENOSCOPY (EGD), REPLACE ESOPHAGEAL STENT;  Upper Endoscopy with Stent Exchange, Cauterization of Tracheosophageal Fistula, Cauterization of Chest Wound   ;  Surgeon: Jens Wise MD;  [facility]     COMBINED ESOPHAGOSCOPY, GASTROSCOPY, DUODENOSCOPY (EGD), REPLACE ESOPHAGEAL STENT N/A 10/20/2017    Procedure: COMBINED ESOPHAGOSCOPY, GASTROSCOPY, DUODENOSCOPY (EGD), REPLACE ESOPHAGEAL STENT;  Upper Endoscopy with Stent Exchange, Cauterization Tracheosphageal Fistula Tract;  Surgeon: Nalini Barreto  MD;  [facility]     COMBINED ESOPHAGOSCOPY, GASTROSCOPY, DUODENOSCOPY (EGD), REPLACE ESOPHAGEAL STENT N/A 11/3/2017    Procedure: COMBINED ESOPHAGOSCOPY, GASTROSCOPY, DUODENOSCOPY (EGD), REPLACE ESOPHAGEAL STENT;  Esophagogastroduodenoscopy, Stent Revision, Cauterization Of Traceoesophageal Fistula and stent exchange;  Surgeon: Nalini Barreto MD;  [facility]     COMBINED ESOPHAGOSCOPY, GASTROSCOPY, DUODENOSCOPY (EGD), REPLACE ESOPHAGEAL STENT N/A 11/17/2017    Procedure: COMBINED ESOPHAGOSCOPY, GASTROSCOPY, DUODENOSCOPY (EGD), REPLACE ESOPHAGEAL STENT;  Esophagogastroduodenoscopy, Esophogeal Stent Removal, Esophogeal Stent Placement (23x100 [id number]), Cauterization Of Fistula Tract;  Surgeon: Nalini Barreto MD;  [facility]     CREATE SPIT FISTULA N/A 1/9/2017    Procedure: CREATE SPIT FISTULA;  Surgeon: Jens Wise MD;  [facility]     ESOPHAGECTOMY N/A 1/9/2017    Procedure: ESOPHAGECTOMY;  Surgeon: Jens Wise MD;  [facility]     ESOPHAGOSCOPY, GASTROSCOPY, DUODENOSCOPY (EGD), COMBINED N/A 4/18/2016    Procedure: COMBINED ESOPHAGOSCOPY, GASTROSCOPY, DUODENOSCOPY (EGD);  Surgeon: Alexis Barraza MD;  [facility]     ESOPHAGOSCOPY, GASTROSCOPY, DUODENOSCOPY (EGD), COMBINED N/A 4/25/2016    Procedure: COMBINED ESOPHAGOSCOPY, GASTROSCOPY, DUODENOSCOPY (EGD);  Surgeon: Alexis Barraza MD;  [facility]     ESOPHAGOSCOPY, GASTROSCOPY, DUODENOSCOPY (EGD), COMBINED N/A 5/4/2016    Procedure: COMBINED ESOPHAGOSCOPY, GASTROSCOPY, DUODENOSCOPY (EGD);  Surgeon: Alexis Barraza MD;  [facility]     ESOPHAGOSCOPY, GASTROSCOPY, DUODENOSCOPY (EGD), COMBINED N/A 5/18/2016    Procedure: COMBINED ESOPHAGOSCOPY, GASTROSCOPY, DUODENOSCOPY (EGD);  Surgeon: Alexis Barraza MD;  [facility]     ESOPHAGOSCOPY, GASTROSCOPY, DUODENOSCOPY (EGD), COMBINED N/A 6/22/2016    Procedure: COMBINED ESOPHAGOSCOPY, GASTROSCOPY, DUODENOSCOPY  (EGD);  Surgeon: Alexis Barraza MD;  [facility]     ESOPHAGOSCOPY, GASTROSCOPY, DUODENOSCOPY (EGD), COMBINED N/A 7/12/2016    Procedure: COMBINED ESOPHAGOSCOPY, GASTROSCOPY, DUODENOSCOPY (EGD);  Surgeon: Jens Wise MD;  [facility]     ESOPHAGOSCOPY, GASTROSCOPY, DUODENOSCOPY (EGD), COMBINED N/A 4/21/2017    Procedure: COMBINED ESOPHAGOSCOPY, GASTROSCOPY, DUODENOSCOPY (EGD);  Esophagogastroduodenoscopy, Pharyngostomy Tube Placement ;  Surgeon: Jens Wise MD;  [facility]     ESOPHAGOSCOPY, GASTROSCOPY, DUODENOSCOPY (EGD), COMBINED N/A 5/19/2017    Procedure: COMBINED ESOPHAGOSCOPY, GASTROSCOPY, DUODENOSCOPY (EGD);  Upper Endoscopy, Irrigation and Debridement of Chest Wound ;  Surgeon: Nalini Barreto MD;  [facility]     ESOPHAGOSCOPY, GASTROSCOPY, DUODENOSCOPY (EGD), COMBINED N/A 5/23/2017    Procedure: COMBINED ESOPHAGOSCOPY, GASTROSCOPY, DUODENOSCOPY (EGD);;  Surgeon: Nalini Barreto MD;  [facility]     ESOPHAGOSCOPY, GASTROSCOPY, DUODENOSCOPY (EGD), COMBINED N/A 6/27/2017    Procedure: COMBINED ESOPHAGOSCOPY, GASTROSCOPY, DUODENOSCOPY (EGD);  Esophagogastroduodenoscopy With Removal And Replacement Of Esophageal Stent exchange. Exchange of pharyngtomy tube. Chest wound dressing change;  Surgeon: Nalini Barreto MD;  [facility]     ESOPHAGOSCOPY, GASTROSCOPY, DUODENOSCOPY (EGD), COMBINED N/A 8/4/2017    Procedure: COMBINED ESOPHAGOSCOPY, GASTROSCOPY, DUODENOSCOPY (EGD);  Esophagogastroduodenoscopy, Stent Removal and Stent Replacement. Dressing Change ;  Surgeon: Nalini Barreto MD;  [facility]     ESOPHAGOSCOPY, GASTROSCOPY, DUODENOSCOPY (EGD), COMBINED N/A 8/25/2017    Procedure: COMBINED ESOPHAGOSCOPY, GASTROSCOPY, DUODENOSCOPY (EGD);  Esophagogastroduodenoscopy,  Stent Revision,  Cauterization;  Surgeon: Jens Wise MD;  [facility]     ESOPHAGOSCOPY, GASTROSCOPY, DUODENOSCOPY (EGD), COMBINED N/A 10/2/2017    Procedure:  COMBINED ESOPHAGOSCOPY, GASTROSCOPY, DUODENOSCOPY (EGD);  Esophagogastroduodenostomy, Replacement of Esophageal Stent, Cauterization of Tracheosophageal Fistula anc chest wall,   C-arm;  Surgeon: Nalini Barreto MD;  [facility]     ESOPHAGOSCOPY, GASTROSCOPY, DUODENOSCOPY (EGD), DILATATION, COMBINED N/A 6/29/2016    Procedure: COMBINED ESOPHAGOSCOPY, GASTROSCOPY, DUODENOSCOPY (EGD), DILATATION;  Surgeon: Jens Wise MD;  [facility]     EXPLORE NECK N/A 5/19/2017    Procedure: EXPLORE NECK;;  Surgeon: Nalini Barreto MD;  [facility]     HC UGI ENDOSCOPY W TRANSENDOSCOPIC STENT PLACEMENT N/A 7/12/2016    Procedure: COMBINED ESOPHAGOSCOPY, GASTROSCOPY, DUODENOSCOPY (EGD), PLACE TRANSENDOSCOPIC ESOPHAGEAL STENT;  Surgeon: Jens Wise MD;  [facility]     HC UGI ENDOSCOPY W TRANSENDOSCOPIC STENT PLACEMENT N/A 7/22/2016    Procedure: COMBINED ESOPHAGOSCOPY, GASTROSCOPY, DUODENOSCOPY (EGD), PLACE TRANSENDOSCOPIC ESOPHAGEAL STENT;  Surgeon: Jens Wise MD;  [facility]     INCISION AND DRAINAGE CHEST WASHOUT, COMBINED N/A 5/27/2017    Procedure: COMBINED INCISION AND DRAINAGE CHEST WASHOUT;  Chest washout;  Surgeon: Nalini Barreto MD;  [facility]     INCISION AND DRAINAGE CHEST WASHOUT, COMBINED N/A 5/28/2017    Procedure: COMBINED INCISION AND DRAINAGE CHEST WASHOUT;  Chest washout;  Surgeon: Nalini Barreto MD;  [facility]     INCISION AND DRAINAGE CHEST WASHOUT, COMBINED N/A 6/9/2017    Procedure: COMBINED INCISION AND DRAINAGE CHEST WASHOUT;  Chest washout and dressing change, Esophaogastroduodenoscopy;  Surgeon: Jens Wise MD;  [facility]     INCISION AND DRAINAGE CHEST WASHOUT, COMBINED N/A 7/17/2017    Procedure: COMBINED INCISION AND DRAINAGE CHEST WASHOUT;  Incision and Drainage of right Chest Wound, Esophagogastroduodenoscopy with stent exchange. pharangostomy tube revision;  Surgeon: Jens Wise MD;   [facility]     IRRIGATION AND DEBRIDEMENT CHEST WASHOUT, COMBINED N/A 6/29/2016    Procedure: COMBINED IRRIGATION AND DEBRIDEMENT CHEST WASHOUT;  Surgeon: Jens Wise MD;  [facility]     IRRIGATION AND DEBRIDEMENT CHEST WASHOUT, COMBINED N/A 5/23/2017    Procedure: COMBINED IRRIGATION AND DEBRIDEMENT CHEST WASHOUT;  COMBINED IRRIGATION AND DEBRIDEMENT CHEST WASHOUT,COMBINED ESOPHAGOSCOPY, GASTROSCOPY, DUODENOSCOPY (EGD) ;  Surgeon: Nalini Barreto MD;  [facility]     IRRIGATION AND DEBRIDEMENT CHEST WASHOUT, COMBINED N/A 5/24/2017    Procedure: COMBINED IRRIGATION AND DEBRIDEMENT CHEST WASHOUT;  Chest wound Washout and Dressing Change;  Surgeon: Nalini Barreto MD;  [facility]     IRRIGATION AND DEBRIDEMENT CHEST WASHOUT, COMBINED N/A 5/25/2017    Procedure: COMBINED IRRIGATION AND DEBRIDEMENT CHEST WASHOUT;  Irrigation and Debridement Chest Washout and dressing change;  Surgeon: Nalini Barreto MD;  [facility]     IRRIGATION AND DEBRIDEMENT CHEST WASHOUT, COMBINED N/A 5/26/2017    Procedure: COMBINED IRRIGATION AND DEBRIDEMENT CHEST WASHOUT;  Irrigation and Debridement Chest Washout with  dressing change;  Surgeon: Nalini Barreto MD;  [facility]     IRRIGATION AND DEBRIDEMENT CHEST WASHOUT, COMBINED N/A 5/30/2017    Procedure: COMBINED IRRIGATION AND DEBRIDEMENT CHEST WASHOUT;  Irrigation and Debridement Chest Washout , PICC line dressing change;  Surgeon: Nalini Barreto MD;  [facility]     IRRIGATION AND DEBRIDEMENT CHEST WASHOUT, COMBINED N/A 5/31/2017    Procedure: COMBINED IRRIGATION AND DEBRIDEMENT CHEST WASHOUT;  Irrigation and Debridement Chest Washout ;  Surgeon: Nalini Barreto MD;  [facility]     IRRIGATION AND DEBRIDEMENT CHEST WASHOUT, COMBINED N/A 6/1/2017    Procedure: COMBINED IRRIGATION AND DEBRIDEMENT CHEST WASHOUT;  Chest Wound Irrigation And Debridement with dressing change ;  Surgeon: Nalini Barreto MD;  [facility]     IRRIGATION AND  DEBRIDEMENT CHEST WASHOUT, COMBINED N/A 6/4/2017    Procedure: COMBINED IRRIGATION AND DEBRIDEMENT CHEST WASHOUT;  COMBINED IRRIGATION AND DEBRIDEMENT CHEST WASHOUT, Esophagoscopy, Gastroscopy, Duodenoscopy (EGD) place transendoscopic esophageal stent,   Pharyngostomy and chest wall tube exchange  C-Arm;  Surgeon: Jens Wise MD;  [facility]     IRRIGATION AND DEBRIDEMENT CHEST WASHOUT, COMBINED N/A 6/2/2017    Procedure: COMBINED IRRIGATION AND DEBRIDEMENT CHEST WASHOUT;  Chest Wound Irrigation and Debridement, Dressing Change;  Surgeon: Nalini Barreto MD;  [facility]     LAPAROSCOPIC ASSISTED INSERTION TUBE JEJUNOSTOMY N/A 4/17/2017    Procedure: LAPAROSCOPIC ASSISTED INSERTION TUBE JEJUNOSTOMY;;  Surgeon: Jens Wise MD;  [facility]     LAPAROSCOPY DIAGNOSTIC (GENERAL) N/A 1/9/2017    Procedure: LAPAROSCOPY DIAGNOSTIC (GENERAL);  Surgeon: Jens Wise MD;  [facility]     LAPAROSCOPY DIAGNOSTIC (GENERAL) N/A 4/17/2017    Procedure: LAPAROSCOPY DIAGNOSTIC (GENERAL);  Laparoscopic , Neck Dissection, Spit Fistula Takedown, Laparoscopic Jejunostomy Tube and Pharyngostomy Tube, Gastric Pull up, Upper Endoscopy(EGD)  , Flexible Bronchoscopy ,Sternotomy ;  Surgeon: Jens Wise MD;  [facility]     LUMPECTOMY BREAST Right 2007     NERVE BLOCK PERIPHERAL N/A 8/30/2016    Procedure: NERVE BLOCK PERIPHERAL;  Surgeon: GENERIC ANESTHESIA PROVIDER;  [facility]     NISSEN FUNDOPLICATION  1/2016     PHARYNGOSTOMY N/A 4/18/2016    Procedure: PHARYNGOSTOMY;  Surgeon: Alexis Barraza MD;  [facility]     PHARYNGOSTOMY N/A 4/25/2016    Procedure: PHARYNGOSTOMY;  Surgeon: Alexis Barraza MD;  [facility]     PHARYNGOSTOMY N/A 5/4/2016    Procedure: PHARYNGOSTOMY;  Surgeon: Alexis Barraza MD;  [facility]     PHARYNGOSTOMY N/A 6/22/2016    Procedure: PHARYNGOSTOMY;  Surgeon: Alexis Barraza MD;   "[facility]     PHARYNGOSTOMY N/A 2016    Procedure: PHARYNGOSTOMY;  Surgeon: Jens Wise MD;  [facility]     PHARYNGOSTOMY N/A 2017    Procedure: PHARYNGOSTOMY;;  Surgeon: Jens Wise MD;  [facility]     PHARYNGOSTOMY Left 2017    Procedure: PHARYNGOSTOMY;;  Surgeon: Nalini Barreto MD;  [facility]     PICC INSERTION Left 2016    5fr DL BioFlo PICC, 42cm (3cm external) in the L medial brachial vein w/ tip in the SVC RA junction.     PICC INSERTION - Rewire Right 2017    5fr DL BioFlo PICC, 40cm (6cm external) in the R medial brachial vein w/ tip in the SVC RA junction.     REPAIR FISTULA TRACHEOESOPHAGEAL N/A 9/15/2017    Procedure: REPAIR FISTULA TRACHEOESOPHAGEAL;;  Surgeon: Jens Wise MD;  [facility]     THORACOTOMY Right     lung infection - \"hard crust formed on lung\"     THORACOTOMY Left 2017    Procedure: THORACOTOMY;  Surgeon: Jens Wise MD;  [facility]     WRIST SURGERY               Social History:       Social History     Tobacco Use     Smoking status: Former Smoker     Packs/day: 1.00     Years: 15.00     Pack years: 15.00     Types: Cigarettes     Last attempt to quit: 1998     Years since quittin.6     Smokeless tobacco: Never Used   Substance Use Topics     Alcohol use: No     Alcohol/week: 0.0 standard drinks             Family History:     Negative for bleeding disorders, clotting disorders, or problems with anesthesia.    Family History   Problem Relation Age of Onset     Alzheimer Disease Mother      Cerebrovascular Disease Father         cerebral hemorrhage     Ovarian Cancer Sister      Breast Cancer Daughter                 Allergies:     Allergies   Allergen Reactions     Amoxicillin Diarrhea     Ativan [Lorazepam] Other (See Comments)     Hallucinations     Morphine Itching             Medications:     No current facility-administered medications on file prior to " encounter.   Acetaminophen (TYLENOL EXTRA STRENGTH PO), Take 1,000 mg by mouth 3 times daily And 500 mg by mouth 2 times daily as needed  aspirin 81 MG EC tablet, Take 81 mg by mouth daily  doxepin (SINEQUAN) 10 MG capsule, Take 20 mg by mouth At Bedtime Prescription for 10mg-50mg at bedtime but patient reports she takes 20mg daily - she had hallucinations with 40mg dose.  furosemide (LASIX) 40 MG tablet, Take 40 mg by mouth every morning  gabapentin (NEURONTIN) 100 MG capsule, Take 400 mg by mouth At Bedtime  loperamide (IMODIUM A-D) 2 MG tablet, Take 2 mg by mouth daily as needed Give 4mg with first loose stool AND Give 2 mg by mouth as needed for Loose Stools 2 mg with each subsequent loose stool episode after initial 4 mg first dose. NOT TO EXCEED 16 MG IN 24  MELATONIN PO, Take 10 mg by mouth At Bedtime   metoprolol succinate ER (TOPROL-XL) 25 MG 24 hr tablet, Take 12.5 mg by mouth every morning  omeprazole (PRILOSEC) 20 MG DR capsule, Take 1 capsule by mouth 3 times daily before meals and at bedtime as needed.  Pediatric Multivit-Minerals-C (CHEWABLES MULTIVITAMIN PO), Take 1 tablet by mouth daily   tiZANidine (ZANAFLEX) 4 MG tablet, Take 4 mg by mouth At Bedtime  vitamin D2 (ERGOCALCIFEROL) 43549 units (1250 mcg) capsule, Take 50,000 Units by mouth once a week  OTHER MEDICAL SUPPLIES, every morning Daily weights.  OTHER MEDICAL SUPPLIES, 4 times daily BP checks qid.  OTHER MEDICAL SUPPLIES, Legs: Black socks and Compriflex LEs. Thigh high wraps. Leave on at all times. If soiled may remove thigh portions.    every shift              Review of Systems:   10-point ROS otherwise negative except as noted above.          Physical Exam:     Temp:  [95.6  F (35.3  C)-96.3  F (35.7  C)] 95.8  F (35.4  C)  Heart Rate:  [88-96] 90  Resp:  [14-16] 14  BP: ()/(65-82) 98/65  SpO2:  [96 %-98 %] 96 %     General: AAOx4, NAD, lying comfortably in bed  CV: regular rate and rhythm, warm, well-perfused, notable prior  esophagocutaneous fistula scarring on chest wall  Pulm: no dyspnea, breathing comfortably on RA  Abd: soft, non-distended, non-tender  Extremities: no edema  Neuro: moving all extremities spontaneously without apparent deficit    No intake/output data recorded.          Data:   Labs:  Arterial Blood Gases   No lab results found in last 7 days.     Complete Blood Count   Recent Labs   Lab 08/04/20  0732 08/02/20  0454 08/01/20  1051   WBC 5.1 7.1 9.3   HGB 11.3* 12.1 11.2*    201 228       Basic Metabolic Panel  Recent Labs   Lab 08/04/20  0732 08/03/20  1550 08/02/20  0454 08/02/20  0139 08/01/20  1747 08/01/20  1218 08/01/20  1051     --  138  --   --  136 130*   POTASSIUM 3.7 4.0 4.6 6.0* 2.9* 2.5* 8.1*   CHLORIDE 109  --  106  --   --  93* 93*   CO2 24  --  23  --   --  29 27   BUN 5*  --  9  --   --  11 12   CR 0.50*  --  0.60  --   --  0.69 Canceled, Test credited   GLC 75  --  62*  --   --  52* 52*   ANNABELLE 7.9*  --  8.6  --   --  8.5 8.5   MAG 2.2 1.7  --   --  1.8  --   --    PHOS 1.7*  --   --   --  1.7*  --   --        Liver Function Tests  Recent Labs   Lab 08/04/20  0732 08/02/20  0454 08/01/20  1218 08/01/20  1051   AST 88* 114* 76* Canceled, Test credited   ALT 71* 76* 65* 83*   ALKPHOS 370* 417* 408* 386*   BILITOTAL 1.6* 1.9* 1.9* 2.5*   ALBUMIN 2.4* 2.6* 2.5* 2.4*       Pancreatic Enzymes  Recent Labs   Lab 08/01/20  1051   LIPASE <10*       Coagulation Profile  Recent Labs   Lab 08/01/20  1051   INR 1.10       Lactate  Recent Labs   Lab 08/01/20  1051   LACT 1.5       Imaging:   Results for orders placed or performed during the hospital encounter of 08/01/20   Head CT w/o contrast    Narrative    CT HEAD W/O CONTRAST 8/1/2020 11:21 AM    History: Fall hit back of head.    Per EPIC: Complaining of?a?fall and generalized weakness. Patient  reports she fell at approximately 0830 am?,?when her legs buckled out  from underneath her, hitting the back of her head on the carpet.?    Comparison:  No similar previous study.    Technique: Using multidetector thin collimation helical acquisition  technique, axial, coronal and sagittal CT images from the skull base  to the vertex were obtained without intravenous contrast.    Findings:     No intracranial hemorrhage, mass effect, or midline shift. Gray/white  matter differentiation in both cerebral hemispheres is preserved.  Moderate parenchymal volume loss of the frontal and temporal lobes.  Patchy periventricular hypoattenuation, most likely represents chronic  small vessel ischemic disease. Ventricles are proportionate to the  cerebral sulci. The basal cisterns are clear.    The bony calvaria and the bones of the skull base are normal. The  visualized portions of the paranasal sinuses and mastoid air cells are  clear. Right parietal scalp edema.      Impression    Impression:  1. No acute intracranial pathology.   2. Frontotemporal cerebral atrophy. Mild chronic small vessel ischemic  disease.    I have personally reviewed the examination and initial interpretation  and I agree with the findings.    WILLIAM HEDRICK MD   Cervical spine CT w/o contrast    Narrative    CT CERVICAL SPINE W/O CONTRAST 8/1/2020 11:36 AM    Provided History: Fall posterior neck pain    Comparison: No similar previous study. Chest CT 11/1/2017.    Technique: Using multidetector thin collimation helical acquisition  technique, axial, coronal and sagittal CT images through the cervical  spine were obtained without intravenous contrast.     Findings:  No acute fracture or traumatic subluxation. No prevertebral edema.  Straightening of cervical lordosis. Degenerative grade 1  anterolisthesis C4 over C5 and C7 over T1. Disc space narrowing and  endplate degenerative changes at C5-T1. Multilevel uncovertebral  spurring and facet arthropathy. Multilevel cervical spondylosis, most  pronounced with left C3-4 severe neural foraminal narrowing. No  high-grade spinal canal narrowing at any  level. No abnormality of the  paraspinous soft tissues.    Atherosclerotic calcification of both carotid bifurcations.    Partially visualized anterior mediastinal esophageal conduit.      Impression    Impression:   1. No acute fracture or traumatic subluxation.  2. Multilevel cervical spondylosis, most pronounced with left C3-4  severe neural foraminal stenosis. No high-grade spinal canal stenosis  at any level.     I have personally reviewed the examination and initial interpretation  and I agree with the findings.    WILLIAM HEDRICK MD   XR Ankle Left G/E 3 Views    Narrative    3 views left ankle radiographs 8/1/2020 11:25 AM    History: fall    Comparison: None    Findings:    Nonweightbearing AP, oblique, and lateral  views of the left ankle  were obtained.     No acute osseous abnormality.      Ankle mortise and syndesmosis are congruent on this non-weight bearing  study. Bones appear osteopenic.    Mild diffuse soft tissue prominence.      Impression    Impression:    No acute osseous abnormality.    MORIS CLEMENS MD (Joe)   XR Chest 2 Views    Narrative     Examination:  XR CHEST 2 VW     Date:  8/1/2020 1:28 PM      Clinical Information: short of breath, weak     Additional Information: none    Comparison: 2/27/2018    Findings: Thoracic and lumbar surgical hardware.    Pulmonary vasculature is distinct. Cardiac silhouette is normal in  size. The lungs are clear. Bilateral blunting of costophrenic angles  likely pleural change.  Surgical clips in the right axilla.  Degenerative changes of the right shoulder. Displaced left rib  fracture.      Impression    Impression:  1. Bilateral blunting of costophrenic angles likely chronic pleural  change, although pleural effusion is possible. Lungs otherwise clear.    BUSHRA STEINBERG MD   US Abdomen Complete    Narrative    EXAMINATION: US ABDOMEN COMPLETE  8/3/2020 1:40 AM      CLINICAL HISTORY: abnormal LFTs    COMPARISON: 11/15/2017 CT         FINDINGS:  The liver is demonstrates increased echogenicity and coarsened  echotexture. There is no intrahepatic or extrahepatic biliary ductal  dilatation. The hepatic duct measures 6 mm. The gallbladder is normal,  without gallstones, wall thickening, or pericholecystic fluid. Small  volume of layering debris in the gallbladder, consistent with sludge.    The spleen measures maximally 10.1 cm and is normal in appearance. The  visualized portions of the pancreas are normal in echogenicity.    The visualized upper abdominal aorta and inferior vena cava are  normal.      The kidneys are normal in position and echogenicity. The right kidney  measures 8.1 cm and the left kidney measures 8.2 cm. There is no  significant urinary tract dilation.     Dilated tubular structure anterior to the IVC, consistent with common  bile duct based on comparison CT 11/15/2017. Measures up to 13 mm,  previously measuring up to 16 mm on comparison 11/15/2017.       Impression    IMPRESSION:   1.  Dilated tubular structure measuring up to 13 mm anterior to the  IVC likely representing the common bile duct based on comparison with  CT 11/15/2017, at which time it measured up to 16 mm. Consider further  characterization with MRCP if there is clinical concern for biliary  obstruction.  2.  Small volume of sludge in the gallbladder. No cholecystitis.  3.  Hepatic steatosis.    [Result: Question of choledocholithiasis]    Finding was identified on 8/3/2020 2:32 AM.     Floor nurse provider was contacted by Dr. Kana Contreras at 8/3/2020  2:50 AM and verbalized understanding of the finding.      I have personally reviewed the examination and initial interpretation  and I agree with the findings.    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    Fluoroscopy time: 3.2 minutes.    Findings:   Postoperative changes of retrosternal gastric pull-up. The esophagus,  stomach, and duodenum are unremarkable and free of filling defects. No  evidence for esophageal or gastric leak. There is a focal narrowing of  the thoracic esophagus which persists on multiple views without  obvious distention and is concerning for possible esophageal  stricture. Esophageal motility is unremarkable. No spontaneous reflux  was seen.    The rugal pattern within the stomach is unremarkable with no ulcer  identified. No mucosal abnormalities or ulcers were identified in the  duodenum. The visualized jejunum is normal. Contrast is visualized  partially opacifying multiple loops of small bowel to the level of the  cecum/terminal ileum at the 200 minute radiograph. Partial  visualization of postoperative changes of posterior spinal fusion and  bilateral sacroiliac joint fusion.      Impression    Impression:   1. Postoperative changes of retrosternal gastric pull-up with possible  midthoracic esophageal stricture.  2. The visualized stomach and small bowel are unremarkable. No small  bowel obstruction.    I, ALTAGRACIA BARBER MD, attest that I was present for all critical  portions of the procedure and was immediately available to provide  guidance and assistance during the remainder of the procedure.    I have personally reviewed the examination and initial interpretation  and I agree with the findings.    ALTAGRACIA BARBER MD   Echo Complete    Narrative    [id number]  [id number]  [id number]  [id number]^ANNALEE^EUGENIO^KEN           [facility]  Echocardiography Laboratory  [address] [place] [id number]     Name: FAVIAN MALONE  MRN: [id number]  : 1945  Study Date: 2020 11:26 AM  Age: 75 yrs  Gender: Female  Patient Location:  UUU5A  Reason For Study: Dyspnea  Ordering Physician: EUGENIO MANTILLA  Performed By: Nicky Mejia RDCS     BSA: 1.3 m2  Height: 60 in  Weight: 93 lb  HR: 82  BP: 127/64 mmHg  _____________________________________________________________________________  __        Procedure  Echocardiogram with two-dimensional, color and spectral Doppler performed.  Contrast Optison. Optison (NDC #2819-8829-88) given intravenously. Patient was  given 5 ml mixture of 3 ml Optison and 6 ml saline. 4 ml wasted.  _____________________________________________________________________________  __        Interpretation Summary  Global and regional left ventricular function is normal with an EF of 60-65%.  Right ventricular function, chamber size, wall motion, and thickness are  normal.  Moderate tricuspid insufficiency is present.  Mild pulmonary hypertension is present.  The inferior vena cava cannot be assessed.  No pericardial effusion is present.  No significant change from prior.  _____________________________________________________________________________  __        Left Ventricle  Left ventricular wall thickness is normal. Left ventricular size is normal.  Global and regional left ventricular function is normal with an EF of 60-65%.  Grade II or moderate diastolic dysfunction.     Right Ventricle  Right ventricular function, chamber size, wall motion, and thickness are  normal.     Atria  Moderate biatrial enlargement is present.     Mitral Valve  The mitral valve is normal.        Aortic Valve  The aortic valve is tricuspid. Moderate aortic valve calcification is present.  Trace to mild aortic insufficiency is present.     Tricuspid Valve  Moderate tricuspid insufficiency is present. The right ventricular systolic  pressure is approximated at 42.8 mmHg plus the right atrial pressure. Mild  pulmonary hypertension is present.     Pulmonic Valve  On Doppler interrogation, there is no significant stenosis or regurgitation.      Vessels  The thoracic aorta is normal. The inferior vena cava cannot be assessed.     Pericardium  No pericardial effusion is present.     _____________________________________________________________________________  __  MMode/2D Measurements & Calculations  IVSd: 0.60 cm  LVIDd: 3.1 cm  LVIDs: 2.4 cm  LVPWd: 0.81 cm  FS: 24.2 %     LV mass(C)d: 53.2 grams  LV mass(C)dI: 39.4 grams/m2  asc Aorta Diam: 3.1 cm  LVOT diam: 1.9 cm  LVOT area: 2.8 cm2  LA Volume (BP): 57.2 ml  LA Volume Index (BP): 42.4 ml/m2  RWT: 0.51        Doppler Measurements & Calculations  MV E max jorge: 101.0 cm/sec  MV A max jorge: 89.7 cm/sec  MV E/A: 1.1  MV dec slope: 540.0 cm/sec2     TR max jorge: 327.0 cm/sec  TR max P.8 mmHg  E/E' av.0  Lateral E/e': 11.8  Medial E/e': 14.3     _____________________________________________________________________________  __           Report approved by: Anjelica Chatman 2020 01:14 PM

## 2020-08-04 NOTE — PLAN OF CARE
5A    Discharge Planner PT   Patient plan for discharge: Home with HHPT  Current status: Pt IND in sit<>stand transfer to [facility]. Pt instructed in LE strengthening exercises, demonstrated good understanding, handout provided. VSS throughout treatment session on RA.  Barriers to return to prior living situation: Medical conditon  Recommendations for discharge: Home with HHPT  Rationale for recommendations: Pt progressing near functional baseline, would benefit from HHPT services to increase activity tolerance and LE strength.        Entered by: Gage Page 08/04/2020 9:42 AM

## 2020-08-04 NOTE — PLAN OF CARE
Time:  1900-0730    Reason for admission: Weakness resulting in a fall  Activity: SBA w/ walker  Pain:  C/o pain in back, neck and ankle. Oxycodone given x1.  Neuro:  AOx4, pleasant and cooperative  Cardiac:  WDL  Respiratory:  WDL  GI/: WDL, ex GI note. LBM 7/30  Diet: Reg diet, tolerating well  Lines:  L PIV SL, WDL  Skin:  WDL ex some bruising throughout  Labs/Imaging:  Some sludge noted on X-ray of GI. Elevated liver labs.    New changes this shift: GI consulted, recommend consult by thoracic surgery, possible EGD w/ dilation after consult. Slept well overnight. VSS.    Plan:  GI following. Possible discharge today pending recommendations from thoracic surgery.

## 2020-08-04 NOTE — PROGRESS NOTES
Care Coordinator Progress Note    Admission Date/Time:  8/1/2020  Attending MD:  Jd Zambrano*    Data  Patient was admitted for:    Weakness  Hypokalemia  Hypoglycemia  Fall, initial encounter  2019-nCoV not detected.    Concerns with insurance coverage for discharge needs: None.  Current Living Situation: Patient lives alone in an independent living facility for seniors, uses [facility] for daily mobility tasks. Pt is IND in daily cares, reports using shower chair when bathing. Drives herself.  Support System: Supportive and Involved  Services Involved: none  Transportation at Discharge: Family or friend will provide  Transportation to Medical Appointments:  - Name of caregiver: self, minimal assist from family  Barriers to Discharge: medical needs      Coordination of Care   D: Plan of care report received from Art Ho MD today, possible patient will be ready to dc tomorrow vs Wednesday 2/2 any interventions GI Team would like to take.    I/A: Voice message received from Sandra with [facility] (ph: [phone]) stating they can accept patient for home services. She requested we please send signed orders when they are final and patient is discharging.    P: Care Coordination will continue to follow, please call or page should new needs arise.    Referral:  [facility]  [address] [facility]  [address] [place] [address]  [address]  Ph: [phone]  Fax: [phone]     RN skilled nursing visit.   RN to assess vital signs and weight, respiratory and cardiac status, pain level and activity tolerance, hydration, nutrition and bowel status   RN to complete home safety evaluation.  RN to complete weekly medication management and set-up.     Physical Therapy to evaluate and treat  Occupational Therapy to evaluate and treat      Plan  Anticipated Discharge Date:  1-2 days  Anticipated Discharge Plan:  Home w/ home care      Sadie Serrano RN, BSN, PHN  Care Coordinator  [facility]  [facility]  Direct phone: [phone]  Pager: [phone]    To contact the on-call Weekend Care Coordination Team please page [phone]

## 2020-08-04 NOTE — PLAN OF CARE
"BP 98/65   Pulse 92   Temp 95.8  F (35.4  C) (Oral)   Resp 14   Ht 1.524 m (5')   Wt 42.1 kg (92 lb 14.4 oz)   SpO2 96%   BMI 18.14 kg/m      Time 2215-8458      Reason for admission: Hypokalemia, Weakness, Hypoglycemia, Fall  Vitals: VSS on RA  Activity: Up SBA w/ walker  Pain: c/o back/neck pain, managed with PO PRN oxy x2  Neuro: A&Ox4, speech logical  Mood/Behavior: calm, cooperative  Cardiac: RRR, no edema BLE  Respiratory: LS clear  GI/: No BM this shift, voiding without difficulty  Diet:Regular  Lines: PIV  Skin: CDI  Labs/imaging: Phosphorus 1.7~replaced per protocol~recheck scheduled for 8pm, Potassium 3.7~replaced per \"high\" protocol~recheck in the morning.      New changes this shift: Thoracic surgery consulted, recommend XR esophagram~ most likely will be tomorrow morning.       Plan: Continue to monitor and follow POC.           "

## 2020-08-04 NOTE — PLAN OF CARE
5A OT    Discharge Planner OT   Patient plan for discharge: Home with [facility] OT/PT  Current status: Pt demonstrating accurate set up of 4/4 medications, describing home med management routine in detail. Therapist educated pt in memory strategies to promote IND. Pt declining offered ADLs, mobility. Pt interested in BUE strengthening HEP, but planning to wait until establishes care with [facility] therapies.   Barriers to return to prior living situation: medical status  Recommendations for discharge: Home with [facility] OT/PT  Rationale for recommendations: Pt is near baseline for ADL IND, will benefit from continued skilled therapies to maximize safety and ADL/IADL IND in home environment.        Entered by: Delphine Montes 08/04/2020 1:09 PM

## 2020-08-04 NOTE — PROGRESS NOTES
Internal Medicine Progress Note - Gold Service  Minerva Blanco MRN: [id number]  Age: 75 year old, : 1945  Date: 2020         Interval History:     No acute events overnight. Feels much better overall. Feels like strength is improved and is doing better. Does not feel like the food is getting stuck any longer. No regurgitation.     Last 24 hr care team notes reviewed.     ROS:  4 point ROS including Respiratory, CV, GI and , is negative other than above.     PHYSICAL EXAM    Vital signs:  Temp: 95.8  F (35.4  C) Temp src: Oral BP: 113/68   Heart Rate: 96 Resp: 14 SpO2: 96 % O2 Device: None (Room air)   Height: 152.4 cm (5') Weight: 42.1 kg (92 lb 14.4 oz)  Estimated body mass index is 18.14 kg/m  as calculated from the following:    Height as of this encounter: 1.524 m (5').    Weight as of this encounter: 42.1 kg (92 lb 14.4 oz).    No intake or output data in the 24 hours ending 20 0652    GEN: NAD, resting comfortably on exam, pleasant and cooperative , AAox3  HEENT: NC/AT , well injected conjunctiva, anicteric sclera, EOMI  Neck: trachea midline, JVD ~10   CV: RRR, nl S1/S2 no mumurs  PULM: bilateral inspiratory rales   Abdomen: soft, non mild tender diffuse, ND, no HSM, BS +   EXTREMITIES: warm, +2 LE edema,   SKIN: No rashes, sores or ulcerations  NEURO: MS: AAOx3 - no focal deficit   Psycho: good mood     DIAGNOSTIC STUDIES  I reviewed all medications, laboratory results, and imaging over the past 24 hours. Notable for;   ROUTINE LABS:   Cr 0.50   Phosph 1.7   Bili 1.6 (1.9)  Alp 370   ALT 71 - AST 81   Hgb 11.3     MICROBIOLOGY  None     IMAGING  Upper GI 8: 1. Postoperative changes of retrosternal gastric pull-up with possible  midthoracic esophageal stricture.  2. The visualized stomach and small bowel are unremarkable. No small  bowel obstruction.    US abdomen 8:  1.  Dilated tubular structure measuring up to 13 mm anterior to the  IVC likely  representing the common bile duct based on comparison with  CT 11/15/2017, at which time it measured up to 16 mm. Consider further  characterization with MRCP if there is clinical concern for biliary  obstruction.  2.  Small volume of sludge in the gallbladder. No cholecystitis.  3.  Hepatic steatosis.      Assessment and Plan:     Date of admission: 8/1/2020    Minerva Blanco is a 75 year old female with a PMH pertinent for history of breast cancer, CAD s/p PCI (4/2018), PAF, HFpEF, HTN, HLD, MS, fibromyalgia, IBS, GERD, esophageal perforation, DVT of left arm s/p thrombectomy, and malnutrition who presents with progressive generalized weakness x 3-4 weeks, culminating in fall     #Plan for today:   - Phosphorus replacement   - Consult Thoracic   - Resume PTA diuretics     # Hypokalemia, resolved:    Suspect secondary to loop diuretics and low dietary K.  No EKG findings.    - Continue electrolyte replacement protocol  - Resume PTA diuretics      # Progressive generalized weakness with fall:  Suffered unwitnessed fall at home with closed head injury but no LOC.  CT head and neck negative.  Etiology of weakness likely malnutrition, volume depletion, and deconditioning contributing.  Cannot r/o nutritional deficiencies.  Symptoms not c/w prior MS exacerbations per patient.  TSH normal. No focal neurologic deficits on exam.  CT head negative for hemorrhage or infarct.  Afebrile.  WBC normal.  No infectious symptoms.  UA and CXR negative.  COVID negative    - PT/OT consults --> Recommending home with home services  - Nutrition consult --> Recommending feeding tube for severe malnutrition, she is resistant to this   - Check vitamin B1 (pending), B12 (elevated), and D levels (normal)  - ECHO stable, does have mild pHTN  - Tele with no arrhythmia      # Dysphagia  # Mid-Esophageal stricture ?  # Severe malnutrition in setting of acute medical illness  She is describing increasing dysphagia alsong with belching and  worsening heart burn symptoms. Exacerbated byt certain foods, jace spicy, carbonated, and acidic. Has complex esophgeal surgical history.   Upper GI series was done and showed possible mid esophageal stricture. GI offered EGD with dilation however patient deferred until we have input from thoracic surgery given complex history.     - Upper GI with possible mid esophageal stricture   - GI consulted , appreciate input   - Awaiting Thoracic surgery input      # Abnormal LFTs:    Etiology unclear.  Obstructive pattern, stable since admission.   Has marked CBD dilation on ultrasound. Has been noted previously on an MRCP June 2019.    No evidence of pathology that requires acute intervention. Optimally would perform an ERCP however that is not an option given esophageal stricture.     - GI consult as above  - Repeat LFTs in AM  - Avoid hepatotoxic agents     # Hypoglycemia:  Suspect d/t poor PO intake.  Glucose 50's on arrival.  Improved to 90's.  - Monitor     # Chronic macrocytic anemia:  Baseline Hgb ~11.  No evidence of recent or active bleeding.  Head CT negative for hemorrhage.    - Repeat CBC in AM      # Type II demand MI  # Hx CAD, HFpEF, and PAF:  Hx inferior MI s/p PCI and stenting of RCA x 2 in 2018.  Trop tredned up overnigth w/o changes in EKG negative for ischemic changes.  An ECHO this admission with normal EF w/o focal changes, there is some TR and mild pHTN    - Continue ASA 81mg daily and metoprolol XL 25mg daily  - resume PTA lasix   - Daily weights     # Hx Multiple sclerosis:  Has been quiescent for years.  Reports current symptoms not c/w prior MS flares.    - Consider neurology consultation if above work up negative and symptoms persist     # Hx Fibromyalgia:  Patient reports chronic pain in neck, shoulders, hips and knees.   No change at this point.     # Hisotry of compression fracture  # Osteopenia  At risk for osteoporosus and given compression fracture in last year, likely has osteoporosis.  Last DEXA in 2015. On TID omep which is not helping with this issue. Shoud have repeat DEXA arranged.       Diet: Combination Diet Regular Diet Adult    DVT Prophylaxis: Enoxaparin (Lovenox) subcutaneous  Whitt Catheter: not present  PT/OT: Y/Y  Code Status: DNR/DNI    Consulting Services:   Thoracic surgery   GI     Disposition Plan   Expected discharge: 2 - 3 days, recommended to prior living arrangement once esophageal stricture plan is established.    Lines:  PIVs    Patient care plan discussed beside with patient, RN .     Regis Leblanc MD  Internal Medicine Hospitalist & Staff Physician  [facility]  Pager: [phone]    Team: Teresita Cordova   Page Cross Cover between 5pm-8am: pager [phone] (Art), if no response then page job code [phone].

## 2020-08-05 NOTE — PROGRESS NOTES
Care Coordinator  D/I: Per DR Ruslan Leblanc, pt may discharge home this evening with [facility] HH: RN,PT,OT services.  I called [facility] [phone] and spoke with Kaye davila RN and informed her she will discharge today.  P: Co worker Sarina HU from [facility]B called pt and gave her her Medicare discharge IMM.

## 2020-08-05 NOTE — PLAN OF CARE
/70 (BP Location: Left arm)   Pulse 97   Temp 97.6  F (36.4  C) (Oral)   Resp 14   Ht 1.524 m (5')   Wt 43.7 kg (96 lb 4.8 oz)   SpO2 98%   BMI 18.81 kg/m      Hours of Care: 9198-9306.    Reason for admission: Progressive weakness and fall.  History of CAD, breast CA, s/p PCI, CHF, GLD, HTN, IBS, GERD, fibromyalgia, esophageal perforation, malnutrition.  Possible esophageal strictures.  Vitals: VSS.   Activity: Up SBA with walker.  Pain: Denies.  Neuro: AO x 4.  Cardiac: WDL.    Respiratory: WDL.  GI/: Voiding spontaneously.  Diet: Regular.    Lines: L PIV SL.  Skin/Wounds: WDL.   Plan: Possible esophagram today.  If EGD with dilation is needed, this will likely be done as an OP.      Continue to monitor and follow POC    Richard Elizondo RN on 8/5/2020 at 6:48 AM

## 2020-08-05 NOTE — PLAN OF CARE
7A OT: Cancel  Pt declined 2/2 breakfast and anticipation for discharge. Declined PM check back. Will reschedule for tomorrow.

## 2020-08-05 NOTE — PLAN OF CARE
Time:  1900-transfer to  @0430     Reason for admission: Weakness resulting in a fall  Activity: SBA w/ walker, stready  Pain:  C/o pain in back, neck and ankle. Oxycodone given x1.  Neuro:  AOx4, pleasant and cooperative  Cardiac:  WDL  Respiratory:  WDL  GI/: WDL, LBM 8/3  Diet: Reg diet, tolerating well  Lines:  L PIV SL, WDL  Skin:  WDL ex some bruising and blanchable reddness throughout  Labs/Imaging:  Phos improved to 2.6 after replacement      New changes this shift: Thoracic consulted, recommend X-ray esophagram today, possible EGD w/ dilation. Slept well overnight. VSS. Pt transferred to  at 0430, report given.      Plan:  Thoracic and GI following. Possible discharge today pending recommendations from thoracic surgery after X-ray esophagram is performed.

## 2020-08-05 NOTE — PLAN OF CARE
7A PT  Discharge Planner PT   Patient plan for discharge: Home today  Current status: Reviewed HEP and discharge recommendations as pt anticipating discharge home today. Pt independent with bed mobility and transfers.  Barriers to return to prior living situation: Medical status, weakness, deconditioning  Recommendations for discharge: Home + HHPT  Rationale for recommendations: Pt will benefit from HHPT to maximize strength, endurance, balance, and safety and independence with functional mobility within the home environment.       Entered by: Allison Mazariegos 08/05/2020 4:08 PM

## 2020-08-05 NOTE — DISCHARGE INSTRUCTIONS
________________________________________________________  Discharge RN please fax discharge orders to home care agency.:  [facility]   [address] [facility] [address]   [place]  [address]   Ph: [phone]   Fax: [phone]   ________________________________________________________

## 2020-08-05 NOTE — PROVIDER NOTIFICATION
08/05/20 0808 08/05/20 0810 08/05/20 0812   Lying Orthostatic BP   Lying Orthostatic /71  --   --    Lying Orthostatic Pulse 96 bpm  --   --    Sitting Orthostatic BP   Sitting Orthostatic BP  --  105/81  --    Sitting Orthostatic Pulse  --  110 bpm  --    Standing Orthostatic BP   Standing Orthostatic BP  --   --  83/57   Standing Orthostatic Pulse  --   --  126 bpm       Dr. Ruslan Leblanc notified of patients positive orthostatic BPs. Metoprolol and lasix held. Pt asymptomatic. Will continue to monitor

## 2020-08-05 NOTE — PLAN OF CARE
/76 (BP Location: Left arm)   Pulse 97   Temp 98  F (36.7  C) (Oral)   Resp 16   Ht 1.524 m (5')   Wt 43.7 kg (96 lb 4.8 oz)   SpO2 100%   BMI 18.81 kg/m      A/Ox4. VSS ex tachycardia. Improved after encouraging fluids. Pt refused small bolus d/t loss of IV access. Potassium, magnesium, and phos replaced per orders. Up SBA + walker. Esophogram completed. Pt adequate for discharge. Discharge paperwork went over with pt. All questions were answered. Paperwork faxed to homecare agency. Her daughter will pick her up at 1700.

## 2020-08-05 NOTE — PROGRESS NOTES
THORACIC & FOREGUT SURGERY    S:  No acute overnight events. Esophogram reviewed by staff. No reported regurgitation or sensation of food getting stuck.     O:  /76   Pulse 97   Temp 98.2  F (36.8  C) (Oral)   Resp 14   Ht 1.524 m (5')   Wt 43.7 kg (96 lb 4.8 oz)   SpO2 98%   BMI 18.81 kg/m      A&Ox3, NAD  Breathing non-labored  RRR  Soft, NDNT  Distal extremities appear well perfused    Recent Labs   Lab 08/05/20  0605 08/04/20  0732 08/03/20  1550 08/02/20  0454  08/01/20  2210 08/01/20  1747 08/01/20  1218 08/01/20  1051   WBC 5.4 5.1  --  7.1  --   --   --   --  9.3   HGB 10.7* 11.3*  --  12.1  --   --   --   --  11.2*   * 121*  --  124*  --   --   --   --  113*    223  --  201  --   --   --   --  228   INR  --   --   --   --   --   --   --   --  1.10    140  --  138  --   --   --  136 130*   POTASSIUM 3.5 3.7 4.0 4.6   < >  --  2.9* 2.5* 8.1*   CHLORIDE 108 109  --  106  --   --   --  93* 93*   CO2 27 24  --  23  --   --   --  29 27   BUN 5* 5*  --  9  --   --   --  11 12   CR 0.45* 0.50*  --  0.60  --   --   --  0.69 Canceled, Test credited   ANIONGAP 6 7  --  10  --   --   --  14 10   ANNABELLE 7.5* 7.9*  --  8.6  --   --   --  8.5 8.5   GLC 69* 75  --  62*  --   --   --  52* 52*   ALBUMIN 2.1* 2.4*  --  2.6*  --   --   --  2.5* 2.4*   PROTTOTAL 5.5* 6.1*  --  6.7*  --   --   --  6.6* 7.1   BILITOTAL 1.3 1.6*  --  1.9*  --   --   --  1.9* 2.5*   ALKPHOS 308* 370*  --  417*  --   --   --  408* 386*   ALT 61* 71*  --  76*  --   --   --  65* 83*   AST 79* 88*  --  114*  --   --   --  76* Canceled, Test credited   LIPASE  --   --   --   --   --   --   --   --  <10*   TROPI  --   --   --   --   --  0.117* 0.132* 0.062* Canceled, Test credited    < > = values in this interval not displayed.     Esophogram reviewed     A/P: Minerva Blanco is a 75 year old female with PMH breast cancer, CAD s/p PCI, PAF, CHF, HTN, MS, fibromyalgia, IBS, GERD, hx lap hiatal hernia with [place] c/b  mediastinal phlegmon s/p esophageal stents, s/p esophagectomy with spit fistula creation (1/2017), s/p retrosternal gastric pullup (4/2017) c/b esophagocutaneous fistula s/p cauterizaion of fistula (11/2017) s/p esophageal stent removal 2/2018 and resolution of her fistula. She is admitted this hospitalization with weakness and failure to thrive. Thoracic consulted for concern for progressive dysphagia and possible stricture. Patient reports 3 weeks of poor oral intake and reflux symptoms. Upper GI with SBFT concerning for possible midthoracic esophageal stricture.    - Esophogram today reviewed by staff, no surgical intervention needed  - Cares per primary team  - Thoracic Surgery will sign off.     Jorge White PA-C  Thoracic and Foregut Surgery

## 2020-08-05 NOTE — PROGRESS NOTES
Gastroenterology Inpatient Sign Off Note    Inpatient GI consults service will sign off. No further recommendations at this time. If primary team has addition questions, please page consult fellow listed in [facility].    Current GI Consult Staff: Dr Doan     Follow up recommendations:   No outpatient GI clinic follow-up indicated. Follow-up with primary care provider at timing determined by discharge physician.    If outpatient GI follow-up is felt indicated by primary care, they may coordinate this by placing new GI referral and contacting  Advanced Endoscopy clinic (Esophageal and Pan[facility]) - ([phone])     Giulia WILKS MD  Gastroenterology Fellow  Division of Gastroenterology, Hepatology and Nutrition  [facility]

## 2020-08-06 NOTE — PLAN OF CARE
Occupational Therapy Discharge Summary    Reason for therapy discharge:    Discharged to home with home therapy.    Progress towards therapy goal(s). See goals on Care Plan in [facility] electronic health record for goal details.  Goals partially met.  Barriers to achieving goals:   discharge from facility.    Therapy recommendation(s):    Continued therapy is recommended.  Rationale/Recommendations:  pt would benefit from continued therapy in order to maximize independence to complete ADLs/IADLs.

## 2020-08-06 NOTE — PROGRESS NOTES
[facility]: Post-Discharge Note  SITUATION                                                      Admission:    Admission Date: 08/01/20   Reason for Admission: malnutrition  Discharge:   Discharge Date: 08/05/20  Discharge Diagnosis: malnutrition  Discharge Service: General Medicine  Discharge Plan: fu pcp    BACKGROUND                                                      Minerva Blanco is a 75 year old female admitted on 8/1/2020. She has a history of breast cancer, CAD s/p PCI (4/2018), PAF, CHF, HTN, HLD, MS, fibromyalgia, IBS, GERD, esophageal perforation, and malnutrition who presents with progressive generalized weakness x 3-4 weeks, culminating in fall at home today without significant injury.  Noted to be hypokalemic on evaluation (K 2.5).  Admitted for further evaluation and management.    ASSESSMENT      Discharge Assessment  Patient reports symptoms are: Improved(slept in this morning. eating well last night and today)  Does the patient have all of their medications?: Yes  Does patient know what their new medications are for?: Yes  Does patient have a follow-up appointment scheduled?: Yes  Does patient have any other questions or concerns?: No    Post-op  Did the patient have surgery or a procedure: No  Fever: No  Chills: No  Eating & Drinking: eating and drinking without complaints/concerns  PO Intake: regular diet  Bowel Function: normal  Urinary Status: voiding without complaint/concerns        PLAN                                                      Outpatient Plan:      Future Appointments   Date Time Provider Department Center   8/10/2020  6:[facility]

## 2020-08-07 NOTE — DISCHARGE SUMMARY
Discharge Summary    Minerva Blanco MRN# [id number]   YOB: 1945 Age: 75 year old     Date of Admission:  8/1/2020  Date of Discharge:  8/5/2020  5:15 PM  Admitting Physician:  Jd Zambrano MD  Discharge Physician:  Regis Leblanc MD  Discharging Service:  Internal Medicine     Primary Provider: Andrea Nino          Outpatient Providers To Do:   Refer to GI to further evaluate elevated LFTs          Discharge Diagnosis:     Active Problems:    Hypokalemia  Fall , mechanical   Progressive weakness  Severe Malnutrition   Dysphagia   Esophageal stricture              Discharge Disposition:     Discharged to home           Condition on Discharge:     Discharge condition: Stable   Code status on discharge: Full Code           Procedures:   No procedures performed during this admission          Discharge Medications:     Discharge Medication List as of 8/5/2020  4:35 PM      CONTINUE these medications which have CHANGED    Details   furosemide (LASIX) 40 MG tablet Take 0.5 tablets (20 mg) by mouth daily as needed, Disp-30 tablet,R-0, E-Prescribe         CONTINUE these medications which have NOT CHANGED    Details   Acetaminophen (TYLENOL EXTRA STRENGTH PO) Take 1,000 mg by mouth 3 times daily And 500 mg by mouth 2 times daily as needed, Historical      aspirin 81 MG EC tablet Take 81 mg by mouth daily, Historical      doxepin (SINEQUAN) 10 MG capsule Take 20 mg by mouth At Bedtime Prescription for 10mg-50mg at bedtime but patient reports she takes 20mg daily - she had hallucinations with 40mg dose., Historical      gabapentin (NEURONTIN) 100 MG capsule Take 400 mg by mouth At Bedtime, Historical      loperamide (IMODIUM A-D) 2 MG tablet Take 2 mg by mouth daily as needed Give 4mg with first loose stool AND Give 2 mg by mouth as needed for Loose Stools 2 mg with each subsequent loose stool episode after initial 4 mg first dose. NOT TO EXCEED 16 MG IN 24, Historical      MELATONIN  PO Take 10 mg by mouth At Bedtime , Historical      metoprolol succinate ER (TOPROL-XL) 25 MG 24 hr tablet Take 12.5 mg by mouth every morning, Historical      omeprazole (PRILOSEC) 20 MG DR capsule Take 1 capsule by mouth 3 times daily before meals and at bedtime as needed., Historical      !! OTHER MEDICAL SUPPLIES every morning Daily weights., Historical      !! OTHER MEDICAL SUPPLIES 4 times daily BP checks qid., Historical      !! OTHER MEDICAL SUPPLIES Legs: Black socks and Compriflex LEs. Thigh high wraps. Leave on at all times. If soiled may remove thigh portions.    every shift, Historical      Pediatric Multivit-Minerals-C (CHEWABLES MULTIVITAMIN PO) Take 1 tablet by mouth daily , Historical      tiZANidine (ZANAFLEX) 4 MG tablet Take 4 mg by mouth At Bedtime, Historical      vitamin D2 (ERGOCALCIFEROL) 90505 units (1250 mcg) capsule Take 50,000 Units by mouth once a week, Historical       !! - Potential duplicate medications found. Please discuss with provider.      STOP taking these medications       multivitamin w/minerals (THERA-VIT-M) tablet Comments:   Reason for Stopping:                     Consultations:     Consultation also recieved from :  Gastroenterology   Thoracic surgery              Brief History of Illness:      Minerva Blanco is a 75 year old female with below medical history who presents with 3-4 weeks of progressive generalized weakness and fatigue.  Patient notes poor oral intake and hydration during that time.  Her appetite has been poor.  Her mobility has been limited and therefore she feels like she is getting weaker and weaker.  For the past 3-4 days she notes significant dyspnea on exertion and fatigue, which has limited her regular activities and ADLs.  She denies any dizziness, palpitations, chest pain, or LE edema.  She denies any recent changes in medications.  No other new symptoms.  Today around 0830 she had a fall at home after developing severe weakness while standing  and then her legs buckled.  She hit the back of her head on the floor.  She denies syncope or LOC.  She twisted her left ankle in the process and reports worsening of her chronic neck pain following the fall.  The paramedics were called and she was brought to the ED for further evaluation.      ED Course:  Afebrile.  Vitals stable.  No hypoxia.  K 2.5.  Glucose 52.  Electrolytes and renal function otherwise normal.  LFTs elevated.  CT head and C spine negative.  CXR negative.  UA negative.  K replaced.  Admitted to medicine for further evaluation.         Hospital Course:     # Progressive generalized weakness with fall:  Suffered unwitnessed fall at home with closed head injury but no LOC.  CT head and neck negative.  Etiology of likely mechanical in the setting of of malnutrition, volume depletion, and deconditioning.     Patient evaluated by PT , OT , and nutrution. She was recommended for feeding tube however patient refused citing she had multiple in the past. During her hospital stay her PO intake substantially improved and she was discharged home with services.     - TTE with no new findings  - CT head negative  - Likely failure to thrive in the setting of poor PO intake --> discharge home with services and encourage PO intake       # Hypokalemia, resolved:    Suspect secondary to loop diuretics and low dietary K.     # Dysphagia  # Severe malnutrition in setting of acute medical illness  Patient described dysphagia alsong with belching and worsening heart burn symptoms. Upper GI series was done and showed possible mid esophageal stricture. However xray esophogram was normal.   Discussed with thoracic and GI , no need for intervention at this point.      # Abnormal LFTs:    Etiology unclear. Has chronic marked CBD dilation on ultrasound. Has been noted previously on an MRCP June 2019.    Discussed with GI plan to repeat MRCP and have further work up as outpatient.       # Hypoglycemia:  Suspect d/t poor PO  intake.  Glucose 50's on arrival.      # Chronic macrocytic anemia:  Baseline Hgb ~11.  No evidence of recent or active bleeding.  Head CT negative for hemorrhage.    - Repeat CBC in AM      # Type II demand MI  # Hx CAD, HFpEF, and PAF:  Hx inferior MI s/p PCI and stenting of RCA x 2 in 2018.      - Continue ASA 81mg daily and metoprolol XL 25mg daily  - Decrease PTA lasix to 20 mg , start only if evidence of volume overload   - Daily weights    No change to chronic medical conditions;   # Hx Multiple sclerosis:  Has been quiescent for years.   # Hx Fibromyalgia:     # Hisotry of compression fracture  # Osteopenia              Final Day of Progress before Discharge:     No data found.    EXAM:  GEN: NAD, resting comfortably on exam, pleasant and cooperative , AAox3  HEENT: NC/AT , well injected conjunctiva, anicteric sclera, EOMI  Neck: trachea midline, JVD ~10   CV: RRR, nl S1/S2 no mumurs  PULM: bilateral inspiratory rales   Abdomen: soft, non mild tender diffuse, ND, no HSM, BS +   EXTREMITIES: warm, +2 LE edema,   SKIN: No rashes, sores or ulcerations  NEURO: MS: AAOx3 - no focal deficit   Psycho: good mood          Data:  All laboratory data reviewed             Significant Results:       Results for orders placed or performed during the hospital encounter of 08/01/20   Head CT w/o contrast     Status: None    Narrative    CT HEAD W/O CONTRAST 8/1/2020 11:21 AM    History: Fall hit back of head.    Per EPIC: Complaining of?a?fall and generalized weakness. Patient  reports she fell at approximately 0830 am?,?when her legs buckled out  from underneath her, hitting the back of her head on the carpet.?    Comparison: No similar previous study.    Technique: Using multidetector thin collimation helical acquisition  technique, axial, coronal and sagittal CT images from the skull base  to the vertex were obtained without intravenous contrast.    Findings:     No intracranial hemorrhage, mass effect, or midline shift.  Gray/white  matter differentiation in both cerebral hemispheres is preserved.  Moderate parenchymal volume loss of the frontal and temporal lobes.  Patchy periventricular hypoattenuation, most likely represents chronic  small vessel ischemic disease. Ventricles are proportionate to the  cerebral sulci. The basal cisterns are clear.    The bony calvaria and the bones of the skull base are normal. The  visualized portions of the paranasal sinuses and mastoid air cells are  clear. Right parietal scalp edema.      Impression    Impression:  1. No acute intracranial pathology.   2. Frontotemporal cerebral atrophy. Mild chronic small vessel ischemic  disease.    I have personally reviewed the examination and initial interpretation  and I agree with the findings.    WILLIAM HEDRICK MD   Cervical spine CT w/o contrast     Status: None    Narrative    CT CERVICAL SPINE W/O CONTRAST 8/1/2020 11:36 AM    Provided History: Fall posterior neck pain    Comparison: No similar previous study. Chest CT 11/1/2017.    Technique: Using multidetector thin collimation helical acquisition  technique, axial, coronal and sagittal CT images through the cervical  spine were obtained without intravenous contrast.     Findings:  No acute fracture or traumatic subluxation. No prevertebral edema.  Straightening of cervical lordosis. Degenerative grade 1  anterolisthesis C4 over C5 and C7 over T1. Disc space narrowing and  endplate degenerative changes at C5-T1. Multilevel uncovertebral  spurring and facet arthropathy. Multilevel cervical spondylosis, most  pronounced with left C3-4 severe neural foraminal narrowing. No  high-grade spinal canal narrowing at any level. No abnormality of the  paraspinous soft tissues.    Atherosclerotic calcification of both carotid bifurcations.    Partially visualized anterior mediastinal esophageal conduit.      Impression    Impression:   1. No acute fracture or traumatic subluxation.  2. Multilevel cervical  spondylosis, most pronounced with left C3-4  severe neural foraminal stenosis. No high-grade spinal canal stenosis  at any level.     I have personally reviewed the examination and initial interpretation  and I agree with the findings.    WILLIAM HEDRICK MD   XR Ankle Left G/E 3 Views     Status: None    Narrative    3 views left ankle radiographs 8/1/2020 11:25 AM    History: fall    Comparison: None    Findings:    Nonweightbearing AP, oblique, and lateral  views of the left ankle  were obtained.     No acute osseous abnormality.      Ankle mortise and syndesmosis are congruent on this non-weight bearing  study. Bones appear osteopenic.    Mild diffuse soft tissue prominence.      Impression    Impression:    No acute osseous abnormality.    MORIS CLEMENS MD (Joe)   XR Chest 2 Views     Status: None    Narrative     Examination:  XR CHEST 2 VW     Date:  8/1/2020 1:28 PM      Clinical Information: short of breath, weak     Additional Information: none    Comparison: 2/27/2018    Findings: Thoracic and lumbar surgical hardware.    Pulmonary vasculature is distinct. Cardiac silhouette is normal in  size. The lungs are clear. Bilateral blunting of costophrenic angles  likely pleural change.  Surgical clips in the right axilla.  Degenerative changes of the right shoulder. Displaced left rib  fracture.      Impression    Impression:  1. Bilateral blunting of costophrenic angles likely chronic pleural  change, although pleural effusion is possible. Lungs otherwise clear.    BUSHRA STEINBERG MD   US Abdomen Complete     Status: None    Narrative    EXAMINATION: US ABDOMEN COMPLETE  8/3/2020 1:40 AM      CLINICAL HISTORY: abnormal LFTs    COMPARISON: 11/15/2017 CT        FINDINGS:  The liver is demonstrates increased echogenicity and coarsened  echotexture. There is no intrahepatic or extrahepatic biliary ductal  dilatation. The hepatic duct measures 6 mm. The gallbladder is normal,  without gallstones, wall  thickening, or pericholecystic fluid. Small  volume of layering debris in the gallbladder, consistent with sludge.    The spleen measures maximally 10.1 cm and is normal in appearance. The  visualized portions of the pancreas are normal in echogenicity.    The visualized upper abdominal aorta and inferior vena cava are  normal.      The kidneys are normal in position and echogenicity. The right kidney  measures 8.1 cm and the left kidney measures 8.2 cm. There is no  significant urinary tract dilation.     Dilated tubular structure anterior to the IVC, consistent with common  bile duct based on comparison CT 11/15/2017. Measures up to 13 mm,  previously measuring up to 16 mm on comparison 11/15/2017.       Impression    IMPRESSION:   1.  Dilated tubular structure measuring up to 13 mm anterior to the  IVC likely representing the common bile duct based on comparison with  CT 11/15/2017, at which time it measured up to 16 mm. Consider further  characterization with MRCP if there is clinical concern for biliary  obstruction.  2.  Small volume of sludge in the gallbladder. No cholecystitis.  3.  Hepatic steatosis.    [Result: Question of choledocholithiasis]    Finding was identified on 8/3/2020 2:32 AM.     Floor nurse provider was contacted by Dr. Kana Contreras at 8/3/2020  2:50 AM and verbalized understanding of the finding.      I have personally reviewed the examination and initial interpretation  and I agree with the findings.    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    Fluoroscopy time: 3.2 minutes.    Findings:   Postoperative changes of retrosternal gastric pull-up. The  esophagus,  stomach, and duodenum are unremarkable and free of filling defects. No  evidence for esophageal or gastric leak. There is a focal narrowing of  the thoracic esophagus which persists on multiple views without  obvious distention and is concerning for possible esophageal  stricture. Esophageal motility is unremarkable. No spontaneous reflux  was seen.    The rugal pattern within the stomach is unremarkable with no ulcer  identified. No mucosal abnormalities or ulcers were identified in the  duodenum. The visualized jejunum is normal. Contrast is visualized  partially opacifying multiple loops of small bowel to the level of the  cecum/terminal ileum at the 200 minute radiograph. Partial  visualization of postoperative changes of posterior spinal fusion and  bilateral sacroiliac joint fusion.      Impression    Impression:   1. Postoperative changes of retrosternal gastric pull-up with possible  midthoracic esophageal stricture.  2. The visualized stomach and small bowel are unremarkable. No small  bowel obstruction.    I, ALTAGRACIA BARBER MD, attest that I was present for all critical  portions of the procedure and was immediately available to provide  guidance and assistance during the remainder of the procedure.    I have personally reviewed the examination and initial interpretation  and I agree with the findings.    ALTAGRACIA BARBER MD   XR Esophagram     Status: None    Narrative    EXAM: XR ESOPHAGRAM, 8/5/2020 2:17 PM    INDICATION: 76 y/o female with history of multiple esophageal  surgeries - evaluate for possible stricture noted on recent upper GI  with small bowel follow-through.    COMPARISON: Upper GI with small bowel follow-through 8/3/2020.    FLUOROSCOPY TIME: 1.9 minutes.    FINDINGS:   Residual contrast in the small bowel from previous upper GI with small  bowel follow-through. Postoperative changes of retrosternal gastric  pull-up. There is a little patulous appearance of the esophagus.  After  ingesting thin and thick liquid barium, there is free passage of  contrast through the esophagus, gastric conduit, and into the stomach  and small intestine. There is normal peristalsis of the esophagus to  the level of the esophagogastric anastomosis. No strictures or spasms  were observed under real-time fluoroscopy for with spot films. There  is a small outpouching just above the esophagogastric anastomosis  which retains a small amount of ingested contrast (image 2). There is  reflux of contrast to the level of the mid thoracic esophagus/gastric  conduit that fluctuates with breathing under real-time fluoroscopy.      Impression    IMPRESSION:   1.  Patulous appearance of the esophagus with no evidence of  esophageal stricture.  2.  Small diverticulum just above the esophagogastric anastomosis  which retains contrast as expected.  3.  Reflux of contrast to the level of midthoracic esophagus/gastric  conduit.    I, ALTAGRACIA BARBER MD, attest that I was present for all critical  portions of the procedure and was immediately available to provide  guidance and assistance during the remainder of the procedure.    I have personally reviewed the examination and initial interpretation  and I agree with the findings.    ALTAGRACIA BARBER MD   CBC with platelets differential     Status: Abnormal   Result Value Ref Range    WBC 9.3 4.0 - 11.0 10e9/L    RBC Count 2.83 (L) 3.8 - 5.2 10e12/L    Hemoglobin 11.2 (L) 11.7 - 15.7 g/dL    Hematocrit 32.0 (L) 35.0 - 47.0 %     (H) 78 - 100 fl    MCH 39.6 (H) 26.5 - 33.0 pg    MCHC 35.0 31.5 - 36.5 g/dL    RDW 16.1 (H) 10.0 - 15.0 %    Platelet Count 228 150 - 450 10e9/L    Diff Method Automated Method     % Neutrophils 84.1 %    % Lymphocytes 6.8 %    % Monocytes 7.7 %    % Eosinophils 0.2 %    % Basophils 0.3 %    % Immature Granulocytes 0.9 %    Nucleated RBCs 0 0 /100    Absolute Neutrophil 7.8 1.6 - 8.3 10e9/L    Absolute Lymphocytes 0.6 (L) 0.8 - 5.3 10e9/L    Absolute  Monocytes 0.7 0.0 - 1.3 10e9/L    Absolute Eosinophils 0.0 0.0 - 0.7 10e9/L    Absolute Basophils 0.0 0.0 - 0.2 10e9/L    Abs Immature Granulocytes 0.1 0 - 0.4 10e9/L    Absolute Nucleated RBC 0.0    Comprehensive metabolic panel     Status: Abnormal   Result Value Ref Range    Sodium 130 (L) 133 - 144 mmol/L    Potassium 8.1 (HH) 3.4 - 5.3 mmol/L    Chloride 93 (L) 94 - 109 mmol/L    Carbon Dioxide 27 20 - 32 mmol/L    Anion Gap 10 3 - 14 mmol/L    Glucose 52 (L) 70 - 99 mg/dL    Urea Nitrogen 12 7 - 30 mg/dL    Creatinine Canceled, Test credited 0.52 - 1.04 mg/dL    GFR Estimate Not Calculated >60 mL/min/[1.73_m2]    GFR Estimate If Black Not Calculated >60 mL/min/[1.73_m2]    Calcium 8.5 8.5 - 10.1 mg/dL    Bilirubin Total 2.5 (H) 0.2 - 1.3 mg/dL    Albumin 2.4 (L) 3.4 - 5.0 g/dL    Protein Total 7.1 6.8 - 8.8 g/dL    Alkaline Phosphatase 386 (H) 40 - 150 U/L    ALT 83 (H) 0 - 50 U/L    AST Canceled, Test credited 0 - 45 U/L   Lipase     Status: Abnormal   Result Value Ref Range    Lipase <10 (L) 73 - 393 U/L   Lactic acid whole blood     Status: None   Result Value Ref Range    Lactic Acid 1.5 0.7 - 2.0 mmol/L   Troponin I     Status: None   Result Value Ref Range    Troponin I ES Canceled, Test credited 0.000 - 0.045 ug/L   TSH with free T4 reflex     Status: None   Result Value Ref Range    TSH 1.60 0.40 - 4.00 mU/L   UA reflex to Microscopic and Culture     Status: Abnormal    Specimen: Urine Midstream; Midstream Urine   Result Value Ref Range    Color Urine Yellow     Appearance Urine Clear     Glucose Urine Negative NEG^Negative mg/dL    Bilirubin Urine Negative NEG^Negative    Ketones Urine 40 (A) NEG^Negative mg/dL    Specific Gravity Urine 1.018 1.003 - 1.035    Blood Urine Negative NEG^Negative    pH Urine 6.0 5.0 - 7.0 pH    Protein Albumin Urine 30 (A) NEG^Negative mg/dL    Urobilinogen mg/dL 2.0 0.0 - 2.0 mg/dL    Nitrite Urine Negative NEG^Negative    Leukocyte Esterase Urine Trace (A) NEG^Negative     Source Midstream Urine     RBC Urine 1 0 - 2 /HPF    WBC Urine 4 0 - 5 /HPF    Bacteria Urine Few (A) NEG^Negative /HPF    Squamous Epithelial /HPF Urine 1 0 - 1 /HPF    Transitional Epi <1 0 - 1 /HPF    Hyaline Casts 4 (H) 0 - 2 /LPF   Asymptomatic COVID-19 Virus (Coronavirus) by PCR     Status: None    Specimen: Nasopharyngeal   Result Value Ref Range    COVID-19 Virus PCR to [facility] - Source Nasopharyngeal     COVID-19 Virus PCR to [facility] - Result Not Detected    Comprehensive metabolic panel     Status: Abnormal   Result Value Ref Range    Sodium 136 133 - 144 mmol/L    Potassium 2.5 (LL) 3.4 - 5.3 mmol/L    Chloride 93 (L) 94 - 109 mmol/L    Carbon Dioxide 29 20 - 32 mmol/L    Anion Gap 14 3 - 14 mmol/L    Glucose 52 (L) 70 - 99 mg/dL    Urea Nitrogen 11 7 - 30 mg/dL    Creatinine 0.69 0.52 - 1.04 mg/dL    GFR Estimate 85 >60 mL/min/[1.73_m2]    GFR Estimate If Black >90 >60 mL/min/[1.73_m2]    Calcium 8.5 8.5 - 10.1 mg/dL    Bilirubin Total 1.9 (H) 0.2 - 1.3 mg/dL    Albumin 2.5 (L) 3.4 - 5.0 g/dL    Protein Total 6.6 (L) 6.8 - 8.8 g/dL    Alkaline Phosphatase 408 (H) 40 - 150 U/L    ALT 65 (H) 0 - 50 U/L    AST 76 (H) 0 - 45 U/L   Troponin I     Status: Abnormal   Result Value Ref Range    Troponin I ES 0.062 (H) 0.000 - 0.045 ug/L   Acetaminophen level     Status: None   Result Value Ref Range    Acetaminophen Level <2 mg/L   Glucose by meter     Status: None   Result Value Ref Range    Glucose 92 70 - 99 mg/dL   Vitamin B1 whole blood     Status: None   Result Value Ref Range    Vitamin B1 Whole Blood Level 74 70 - 180 nmol/L   Troponin I     Status: Abnormal   Result Value Ref Range    Troponin I ES 0.132 (HH) 0.000 - 0.045 ug/L   Vitamin D Deficiency     Status: None   Result Value Ref Range    Vitamin D Deficiency screening 35 20 - 75 ug/L   INR     Status: None   Result Value Ref Range    INR 1.10 0.86 - 1.14   Nt probnp inpatient     Status: Abnormal   Result Value Ref Range    N-Terminal Pro BNP  Inpatient 3,244 (H) 0 - 900 pg/mL   Vitamin B12     Status: Abnormal   Result Value Ref Range    Vitamin B12 2,338 (H) 193 - 986 pg/mL   Potassium     Status: Abnormal   Result Value Ref Range    Potassium 2.9 (L) 3.4 - 5.3 mmol/L   Magnesium     Status: None   Result Value Ref Range    Magnesium 1.8 1.6 - 2.3 mg/dL   Phosphorus     Status: Abnormal   Result Value Ref Range    Phosphorus 1.7 (L) 2.5 - 4.5 mg/dL   Troponin I     Status: Abnormal   Result Value Ref Range    Troponin I ES 0.117 (H) 0.000 - 0.045 ug/L   Lactic acid level STAT     Status: None   Result Value Ref Range    Lactate for Sepsis Protocol 2.0 0.7 - 2.0 mmol/L   Potassium     Status: Abnormal   Result Value Ref Range    Potassium 6.0 (H) 3.4 - 5.3 mmol/L   Comprehensive metabolic panel     Status: Abnormal   Result Value Ref Range    Sodium 138 133 - 144 mmol/L    Potassium 4.6 3.4 - 5.3 mmol/L    Chloride 106 94 - 109 mmol/L    Carbon Dioxide 23 20 - 32 mmol/L    Anion Gap 10 3 - 14 mmol/L    Glucose 62 (L) 70 - 99 mg/dL    Urea Nitrogen 9 7 - 30 mg/dL    Creatinine 0.60 0.52 - 1.04 mg/dL    GFR Estimate 90 >60 mL/min/[1.73_m2]    GFR Estimate If Black >90 >60 mL/min/[1.73_m2]    Calcium 8.6 8.5 - 10.1 mg/dL    Bilirubin Total 1.9 (H) 0.2 - 1.3 mg/dL    Albumin 2.6 (L) 3.4 - 5.0 g/dL    Protein Total 6.7 (L) 6.8 - 8.8 g/dL    Alkaline Phosphatase 417 (H) 40 - 150 U/L    ALT 76 (H) 0 - 50 U/L     (H) 0 - 45 U/L   CBC with platelets differential     Status: Abnormal   Result Value Ref Range    WBC 7.1 4.0 - 11.0 10e9/L    RBC Count 2.99 (L) 3.8 - 5.2 10e12/L    Hemoglobin 12.1 11.7 - 15.7 g/dL    Hematocrit 37.0 35.0 - 47.0 %     (H) 78 - 100 fl    MCH 40.5 (H) 26.5 - 33.0 pg    MCHC 32.7 31.5 - 36.5 g/dL    RDW 17.0 (H) 10.0 - 15.0 %    Platelet Count 201 150 - 450 10e9/L    Diff Method Automated Method     % Neutrophils 74.7 %    % Lymphocytes 13.2 %    % Monocytes 10.0 %    % Eosinophils 0.4 %    % Basophils 0.6 %    % Immature  Granulocytes 1.1 %    Nucleated RBCs 0 0 /100    Absolute Neutrophil 5.3 1.6 - 8.3 10e9/L    Absolute Lymphocytes 0.9 0.8 - 5.3 10e9/L    Absolute Monocytes 0.7 0.0 - 1.3 10e9/L    Absolute Eosinophils 0.0 0.0 - 0.7 10e9/L    Absolute Basophils 0.0 0.0 - 0.2 10e9/L    Abs Immature Granulocytes 0.1 0 - 0.4 10e9/L    Absolute Nucleated RBC 0.0    Potassium     Status: None   Result Value Ref Range    Potassium 4.0 3.4 - 5.3 mmol/L   Magnesium     Status: None   Result Value Ref Range    Magnesium 1.7 1.6 - 2.3 mg/dL   CBC with platelets differential     Status: Abnormal   Result Value Ref Range    WBC 5.1 4.0 - 11.0 10e9/L    RBC Count 2.85 (L) 3.8 - 5.2 10e12/L    Hemoglobin 11.3 (L) 11.7 - 15.7 g/dL    Hematocrit 34.6 (L) 35.0 - 47.0 %     (H) 78 - 100 fl    MCH 39.6 (H) 26.5 - 33.0 pg    MCHC 32.7 31.5 - 36.5 g/dL    RDW 16.6 (H) 10.0 - 15.0 %    Platelet Count 223 150 - 450 10e9/L    Diff Method Automated Method     % Neutrophils 58.6 %    % Lymphocytes 23.5 %    % Monocytes 13.5 %    % Eosinophils 2.2 %    % Basophils 1.2 %    % Immature Granulocytes 1.0 %    Nucleated RBCs 0 0 /100    Absolute Neutrophil 3.0 1.6 - 8.3 10e9/L    Absolute Lymphocytes 1.2 0.8 - 5.3 10e9/L    Absolute Monocytes 0.7 0.0 - 1.3 10e9/L    Absolute Eosinophils 0.1 0.0 - 0.7 10e9/L    Absolute Basophils 0.1 0.0 - 0.2 10e9/L    Abs Immature Granulocytes 0.1 0 - 0.4 10e9/L    Absolute Nucleated RBC 0.0    Comprehensive metabolic panel     Status: Abnormal   Result Value Ref Range    Sodium 140 133 - 144 mmol/L    Potassium 3.7 3.4 - 5.3 mmol/L    Chloride 109 94 - 109 mmol/L    Carbon Dioxide 24 20 - 32 mmol/L    Anion Gap 7 3 - 14 mmol/L    Glucose 75 70 - 99 mg/dL    Urea Nitrogen 5 (L) 7 - 30 mg/dL    Creatinine 0.50 (L) 0.52 - 1.04 mg/dL    GFR Estimate >90 >60 mL/min/[1.73_m2]    GFR Estimate If Black >90 >60 mL/min/[1.73_m2]    Calcium 7.9 (L) 8.5 - 10.1 mg/dL    Bilirubin Total 1.6 (H) 0.2 - 1.3 mg/dL    Albumin 2.4 (L) 3.4 -  5.0 g/dL    Protein Total 6.1 (L) 6.8 - 8.8 g/dL    Alkaline Phosphatase 370 (H) 40 - 150 U/L    ALT 71 (H) 0 - 50 U/L    AST 88 (H) 0 - 45 U/L   Phosphorus     Status: Abnormal   Result Value Ref Range    Phosphorus 1.7 (L) 2.5 - 4.5 mg/dL   Magnesium     Status: None   Result Value Ref Range    Magnesium 2.2 1.6 - 2.3 mg/dL   Alkaline phosphatase isoenzymes     Status: Abnormal   Result Value Ref Range    Alkptase Isoenzymes 390 (H) 40 - 120 U/L    Alkptase % Bone 82 (H) 0 - 55 U/L    Alkptase % Liver 308 (H) 0 - 94 U/L    Alkptase % Other 0 U/L   Bone specific alk phosphatase     Status: None   Result Value Ref Range    Bone Spec Alk Phosphatase 41.3 ug/L   Hepatitis B surface antigen     Status: None   Result Value Ref Range    Hep B Surface Agn Nonreactive NR^Nonreactive   Hepatitis B Surface Antibody     Status: None   Result Value Ref Range    Hepatitis B Surface Antibody 0.11 <8.00 m[IU]/mL   Hepatits C antibody     Status: None   Result Value Ref Range    Hepatitis C Antibody Nonreactive NR^Nonreactive   Phosphorus     Status: None   Result Value Ref Range    Phosphorus 2.6 2.5 - 4.5 mg/dL   CBC with platelets     Status: Abnormal   Result Value Ref Range    WBC 5.4 4.0 - 11.0 10e9/L    RBC Count 2.62 (L) 3.8 - 5.2 10e12/L    Hemoglobin 10.7 (L) 11.7 - 15.7 g/dL    Hematocrit 33.0 (L) 35.0 - 47.0 %     (H) 78 - 100 fl    MCH 40.8 (H) 26.5 - 33.0 pg    MCHC 32.4 31.5 - 36.5 g/dL    RDW 16.9 (H) 10.0 - 15.0 %    Platelet Count 176 150 - 450 10e9/L   Comprehensive metabolic panel     Status: Abnormal   Result Value Ref Range    Sodium 141 133 - 144 mmol/L    Potassium 3.5 3.4 - 5.3 mmol/L    Chloride 108 94 - 109 mmol/L    Carbon Dioxide 27 20 - 32 mmol/L    Anion Gap 6 3 - 14 mmol/L    Glucose 69 (L) 70 - 99 mg/dL    Urea Nitrogen 5 (L) 7 - 30 mg/dL    Creatinine 0.45 (L) 0.52 - 1.04 mg/dL    GFR Estimate >90 >60 mL/min/[1.73_m2]    GFR Estimate If Black >90 >60 mL/min/[1.73_m2]    Calcium 7.5 (L) 8.5  - 10.1 mg/dL    Bilirubin Total 1.3 0.2 - 1.3 mg/dL    Albumin 2.1 (L) 3.4 - 5.0 g/dL    Protein Total 5.5 (L) 6.8 - 8.8 g/dL    Alkaline Phosphatase 308 (H) 40 - 150 U/L    ALT 61 (H) 0 - 50 U/L    AST 79 (H) 0 - 45 U/L   Phosphorus     Status: None   Result Value Ref Range    Phosphorus 2.6 2.5 - 4.5 mg/dL   Magnesium     Status: None   Result Value Ref Range    Magnesium 2.0 1.6 - 2.3 mg/dL   Cortisol     Status: Abnormal   Result Value Ref Range    Cortisol Serum 26.1 (H) 4 - 22 ug/dL   Glucose by meter     Status: None   Result Value Ref Range    Glucose 90 70 - 99 mg/dL   EKG 12-lead, tracing only     Status: None   Result Value Ref Range    Interpretation ECG Click View Image link to view waveform and result    EKG 12-lead, complete     Status: None   Result Value Ref Range    Interpretation ECG Click View Image link to view waveform and result    GI LUMINAL ADULT IP CONSULT: Patient to be seen: Routine within 24 hrs; Call back #: [phone]; Patient with severe malnutrition and history of esophaegeal performation s/p multiple procedures admitted with weakness and obstructive pattern on LFT...     Status: None ()    Feliciano Isbell MD     8/3/2020  7:29 PM      GASTROENTEROLOGY CONSULTATION      Date of Admission:  8/1/2020           Reason for Consultation:   We were asked by Dr. Zambrano to evaluate this patient with   dysphagia           ASSESSMENT AND RECOMMENDATIONS:   Assessment:  75 year old female with complex medical/surgical history after   toupet fundoplication for hiatal hernia with complicated   post-operative course (see HPI for full details) with GI anatomy   consisting of a thoracic esophagogastric anastamosis, who was   admitted with failure to thrive, weight loss, and weakness over   the past month in the setting of worsening primarily pill   dysphagia and atypical reflux symptoms. Esophagram with SBFT is   suggestive of a midthoracic esophageal stricture. She would   likely  benefit from EGD and potential dilation, however the   patient is understandably reluctant to undergo any further   procedures without evaluation by Dr. Wise/Thoracic Surgery,   who know her well.    With regards to her liver enzyme abnormalities, these are   chronically elevated in a mild obstructive pattern, but unclear   how much of her alkaline phosphatase elevation is related to bone   metabolism. Her CBD is dilated of unclear etiology, although   Regardless, she does not have signs/symptoms of   choledocholithiasis or cholangitis, and would not be a good   candidate for EUS/ERCP.     Recommendations  - please consult and discuss this case with Dr. Wise (or   Thoracic Surgery service) due to her complex esophageal/thoracic   history  - we will plan EGD and possible dilation once discussed with Dr. Wise  - recommend checking bone alkaline phosphatase to work up   chronically elevated alkaline phosphatase  - repeat liver chemistries tomorrow  - we will continue to follow    Gastroenterology outpatient follow up recommendations: TBD    Thank you for involving us in this patient's care. Please do not   hesitate to contact the GI service with any questions or   concerns.     Pt care plan discussed with Dr. Parmar, GI staff physician.    Feliciano Carbajal MD  ------------------------------------------------------------------  -------------------------------------------------           History of Present Illness:   Minerva Blanco is a 75 year old female with a history of   atrial fibrillation, breast cancer s/p lumpectomy 2007,   fibromyalgia, GERD, IBS, meniere's disease, MS and symptomatic   hiatal hernia status post Toupet fundoplication 1/2016 c/b   esophageal perforation with mediastinal phlegmon s/p initial   proximal gastrectomy and distal esophagectomy and subsequent   retrosternal gastric pull-through, spit fistula creation   (subsequently taken down by Dr. Wise), left and right    thoracotomies for mediastinal phlegmon excision, numerous   washouts and lysis of adhesions, multiple esophageal dilations   and stent placement/removals, and jejunostomy feeding tube   placement (subsequently removed), amongst multiple other   interventions. She was in and out of the hospital frequently for   about 2 years between 2016 and 2018. She has had some other   medical setbacks as well, but over the past year has been living   semi independently in assisted living in her own apartment.    However, over the past month or so, she has become progressively   weaker, ultimately culminating in a fall a few days back which   prompted her admission to the hospital. She has had reduced PO   intake over the past several weeks, related in part to a lack of   appetite, but also to increased dysphagia, particularly to pills,   when she experiences a sticking sensation retrosternally, often   followed by heaving/spitting up of the pill and frothy   liquid/secretions. This does not happen frequently with solid   foods or liquids. She also has had worsening retrosternal pain   associated with ingestion of spicy and acidic foods, described as   a stinging sensation. GERD has been a longstanding issue for her,   but worsened over the past year or so. This will sometimes prompt   regurgitation of frothy secretions, and sometimes thicker mucous.   No sour taste or solid food regurgitation, but does have   belching. She is on Prilosec at home, 20 mg TID. She has lost a   few pounds over the last month, but still weighs more than when   she moved to her most recent assisted living apartment about a   year ago.    Work-up here has been notable for electrolyte derangements, and   primary service suggests much of her presentation is related to   dehydration and deconditioning. Nutrition has evaluated and is   recommended a feeding tube for improved nutritional intake. RUQ   ultrasound was obtained due to mildly elevated  bilirubin at 1.9   and alkaline phosphatase of 417, and found CBD 13 mm (prior 16 mm   in 2017, and increased to 13mm earlier in 2016), sludge in the   gallbladder, but no visualized stones. Patient denies RUQ pain,   fevers, episodes of jaundice. SBFT noted retrosternal gastric   pull-up with possible midthoracic esophageal stricture.            Past Medical History:   Reviewed and edited as appropriate  Past Medical History:   Diagnosis Date     Breast cancer (H) 2007    right side - lumpectomy, chemo, and local radiation     Chronic infection     infection/wound for two years after esoph surgery     Compression fracture of spine (H)     T12-L1     Coronary artery disease 04/2018    s/p PCI with ADOLFO to proximal and mid RCA     Esophageal perforation      Fibromyalgia      GERD (gastroesophageal reflux disease)      Hiatal hernia      IBS (irritable bowel syndrome)      Meniere's disease     deaf left ear     Multiple sclerosis (H)      Noninfectious ileitis      Other chronic pain      Paroxysmal atrial fibrillation (H)             Past Surgical History:   Reviewed and edited as appropriate   Past Surgical History:   Procedure Laterality Date     APPENDECTOMY       BACK SURGERY  5/1/2015    lumbar fusion     BRONCHOSCOPY FLEXIBLE N/A 4/17/2017    Procedure: BRONCHOSCOPY FLEXIBLE;;  Surgeon: Jens Wise MD;  [facility]: [facility]     C GASTROSTOMY/JEJUN TUBE      J tube for feedings     CLOSE SPIT FISTULA N/A 4/17/2017    Procedure: CLOSE SPIT FISTULA;;  Surgeon: Jens Wise MD;  [facility]     COMBINED ESOPHAGOSCOPY, GASTROSCOPY, DUODENOSCOPY (EGD), REMOVE   ESOPHAGEAL STENT N/A 8/26/2016    Procedure: COMBINED ESOPHAGOSCOPY, GASTROSCOPY, DUODENOSCOPY   (EGD), REMOVE ESOPHAGEAL STENT;  Surgeon: Jens Wise MD;  [facility]: [facility]     COMBINED ESOPHAGOSCOPY, GASTROSCOPY, DUODENOSCOPY (EGD), REMOVE   ESOPHAGEAL STENT N/A 2/12/2018    Procedure: COMBINED ESOPHAGOSCOPY,  GASTROSCOPY, DUODENOSCOPY   (EGD), REMOVE ESOPHAGEAL STENT;  Esophagogastroduodenoscopy With   Stent Removal;  Surgeon: Jens Wise MD;  [facility]     COMBINED ESOPHAGOSCOPY, GASTROSCOPY, DUODENOSCOPY (EGD),   REPLACE ESOPHAGEAL STENT N/A 8/25/2017    Procedure: COMBINED ESOPHAGOSCOPY, GASTROSCOPY, DUODENOSCOPY   (EGD), REPLACE ESOPHAGEAL STENT;;  Surgeon: Jens Wise MD;  [facility]     COMBINED ESOPHAGOSCOPY, GASTROSCOPY, DUODENOSCOPY (EGD),   REPLACE ESOPHAGEAL STENT N/A 9/15/2017    Procedure: COMBINED ESOPHAGOSCOPY, GASTROSCOPY, DUODENOSCOPY   (EGD), REPLACE ESOPHAGEAL STENT;  Upper Endoscopy with Stent   Exchange, Cauterization of Tracheosophageal Fistula,   Cauterization of Chest Wound   ;  Surgeon: Jens Wise MD;  [facility]     COMBINED ESOPHAGOSCOPY, GASTROSCOPY, DUODENOSCOPY (EGD),   REPLACE ESOPHAGEAL STENT N/A 10/20/2017    Procedure: COMBINED ESOPHAGOSCOPY, GASTROSCOPY, DUODENOSCOPY   (EGD), REPLACE ESOPHAGEAL STENT;  Upper Endoscopy with Stent   Exchange, Cauterization Tracheosphageal Fistula Tract;  Surgeon:   Nalini Barreto MD;  [facility]     COMBINED ESOPHAGOSCOPY, GASTROSCOPY, DUODENOSCOPY (EGD),   REPLACE ESOPHAGEAL STENT N/A 11/3/2017    Procedure: COMBINED ESOPHAGOSCOPY, GASTROSCOPY, DUODENOSCOPY   (EGD), REPLACE ESOPHAGEAL STENT;  Esophagogastroduodenoscopy,   Stent Revision, Cauterization Of Traceoesophageal Fistula and   stent exchange;  Surgeon: Nalini Barreto MD;  [facility]     COMBINED ESOPHAGOSCOPY, GASTROSCOPY, DUODENOSCOPY (EGD),   REPLACE ESOPHAGEAL STENT N/A 11/17/2017    Procedure: COMBINED ESOPHAGOSCOPY, GASTROSCOPY, DUODENOSCOPY   (EGD), REPLACE ESOPHAGEAL STENT;  Esophagogastroduodenoscopy,   Esophogeal Stent Removal, Esophogeal Stent Placement (23x100   EndoMAXX), Cauterization Of Fistula Tract;  Surgeon: Nalini Barreto MD;  [facility]     CREATE SPIT FISTULA N/A 1/9/2017    Procedure: CREATE  SPIT FISTULA;  Surgeon: Jens Wise MD;  [facility]     ESOPHAGECTOMY N/A 1/9/2017    Procedure: ESOPHAGECTOMY;  Surgeon: Jens Wise MD;  [facility]     ESOPHAGOSCOPY, GASTROSCOPY, DUODENOSCOPY (EGD), COMBINED N/A   4/18/2016    Procedure: COMBINED ESOPHAGOSCOPY, GASTROSCOPY, DUODENOSCOPY   (EGD);  Surgeon: Alexis Barraza MD;  [facility]     ESOPHAGOSCOPY, GASTROSCOPY, DUODENOSCOPY (EGD), COMBINED N/A   4/25/2016    Procedure: COMBINED ESOPHAGOSCOPY, GASTROSCOPY, DUODENOSCOPY   (EGD);  Surgeon: Alexis Barraza MD;  [facility]     ESOPHAGOSCOPY, GASTROSCOPY, DUODENOSCOPY (EGD), COMBINED N/A   5/4/2016    Procedure: COMBINED ESOPHAGOSCOPY, GASTROSCOPY, DUODENOSCOPY   (EGD);  Surgeon: Alexis Barraza MD;  [facility]     ESOPHAGOSCOPY, GASTROSCOPY, DUODENOSCOPY (EGD), COMBINED N/A   5/18/2016    Procedure: COMBINED ESOPHAGOSCOPY, GASTROSCOPY, DUODENOSCOPY   (EGD);  Surgeon: Alexis Barraza MD;  [facility]     ESOPHAGOSCOPY, GASTROSCOPY, DUODENOSCOPY (EGD), COMBINED N/A   6/22/2016    Procedure: COMBINED ESOPHAGOSCOPY, GASTROSCOPY, DUODENOSCOPY   (EGD);  Surgeon: Alexis Barraza MD;  [facility]     ESOPHAGOSCOPY, GASTROSCOPY, DUODENOSCOPY (EGD), COMBINED N/A   7/12/2016    Procedure: COMBINED ESOPHAGOSCOPY, GASTROSCOPY, DUODENOSCOPY   (EGD);  Surgeon: Jens Wise MD;  [facility]     ESOPHAGOSCOPY, GASTROSCOPY, DUODENOSCOPY (EGD), COMBINED N/A   4/21/2017    Procedure: COMBINED ESOPHAGOSCOPY, GASTROSCOPY, DUODENOSCOPY   (EGD);  Esophagogastroduodenoscopy, Pharyngostomy Tube Placement   ;  Surgeon: Jens Wise MD;  [facility]     ESOPHAGOSCOPY, GASTROSCOPY, DUODENOSCOPY (EGD), COMBINED N/A   5/19/2017    Procedure: COMBINED ESOPHAGOSCOPY, GASTROSCOPY, DUODENOSCOPY   (EGD);  Upper Endoscopy, Irrigation and Debridement of Chest   Wound ;  Surgeon: Nalini Barreto  MD;  [facility]     ESOPHAGOSCOPY, GASTROSCOPY, DUODENOSCOPY (EGD), COMBINED N/A   5/23/2017    Procedure: COMBINED ESOPHAGOSCOPY, GASTROSCOPY, DUODENOSCOPY   (EGD);;  Surgeon: Nalini Barreto MD;  [facility]     ESOPHAGOSCOPY, GASTROSCOPY, DUODENOSCOPY (EGD), COMBINED N/A   6/27/2017    Procedure: COMBINED ESOPHAGOSCOPY, GASTROSCOPY, DUODENOSCOPY   (EGD);  Esophagogastroduodenoscopy With Removal And Replacement   Of Esophageal Stent exchange. Exchange of pharyngtomy tube. Chest   wound dressing change;  Surgeon: Nalini Barreto MD;  [facility]     ESOPHAGOSCOPY, GASTROSCOPY, DUODENOSCOPY (EGD), COMBINED N/A   8/4/2017    Procedure: COMBINED ESOPHAGOSCOPY, GASTROSCOPY, DUODENOSCOPY   (EGD);  Esophagogastroduodenoscopy, Stent Removal and Stent   Replacement. Dressing Change ;  Surgeon: Nalini Barreto MD;    [facility]     ESOPHAGOSCOPY, GASTROSCOPY, DUODENOSCOPY (EGD), COMBINED N/A   8/25/2017    Procedure: COMBINED ESOPHAGOSCOPY, GASTROSCOPY, DUODENOSCOPY   (EGD);  Esophagogastroduodenoscopy,  Stent Revision,    Cauterization;  Surgeon: Jens Wise MD;  [facility]     ESOPHAGOSCOPY, GASTROSCOPY, DUODENOSCOPY (EGD), COMBINED N/A   10/2/2017    Procedure: COMBINED ESOPHAGOSCOPY, GASTROSCOPY, DUODENOSCOPY   (EGD);  Esophagogastroduodenostomy, Replacement of Esophageal   Stent, Cauterization of Tracheosophageal Fistula anc chest wall,     C-arm;  Surgeon: Nalini Barreto MD;  [facility]     ESOPHAGOSCOPY, GASTROSCOPY, DUODENOSCOPY (EGD), DILATATION,   COMBINED N/A 6/29/2016    Procedure: COMBINED ESOPHAGOSCOPY, GASTROSCOPY, DUODENOSCOPY   (EGD), DILATATION;  Surgeon: Jens Wise MD;    [facility]     EXPLORE NECK N/A 5/19/2017    Procedure: EXPLORE NECK;;  Surgeon: Nalini Barreto MD;    [facility]     HC UGI ENDOSCOPY W TRANSENDOSCOPIC STENT PLACEMENT N/A   7/12/2016    Procedure: COMBINED ESOPHAGOSCOPY, GASTROSCOPY, DUODENOSCOPY   (EGD), PLACE  TRANSENDOSCOPIC ESOPHAGEAL STENT;  Surgeon: Jens Wise MD;  [facility]     HC UGI ENDOSCOPY W TRANSENDOSCOPIC STENT PLACEMENT N/A   7/22/2016    Procedure: COMBINED ESOPHAGOSCOPY, GASTROSCOPY, DUODENOSCOPY   (EGD), PLACE TRANSENDOSCOPIC ESOPHAGEAL STENT;  Surgeon: Jens Wise MD;  [facility]     INCISION AND DRAINAGE CHEST WASHOUT, COMBINED N/A 5/27/2017    Procedure: COMBINED INCISION AND DRAINAGE CHEST WASHOUT;  Chest   washout;  Surgeon: Nalini Barreto MD;  [facility]     INCISION AND DRAINAGE CHEST WASHOUT, COMBINED N/A 5/28/2017    Procedure: COMBINED INCISION AND DRAINAGE CHEST WASHOUT;  Chest   washout;  Surgeon: Nalini Barreto MD;  [facility]     INCISION AND DRAINAGE CHEST WASHOUT, COMBINED N/A 6/9/2017    Procedure: COMBINED INCISION AND DRAINAGE CHEST WASHOUT;  Chest   washout and dressing change, Esophaogastroduodenoscopy;  Surgeon:   Jens Wise MD;  [facility]     INCISION AND DRAINAGE CHEST WASHOUT, COMBINED N/A 7/17/2017    Procedure: COMBINED INCISION AND DRAINAGE CHEST WASHOUT;    Incision and Drainage of right Chest Wound,   Esophagogastroduodenoscopy with stent exchange. pharangostomy   tube revision;  Surgeon: Jens Wise MD;  [facility]     IRRIGATION AND DEBRIDEMENT CHEST WASHOUT, COMBINED N/A   6/29/2016    Procedure: COMBINED IRRIGATION AND DEBRIDEMENT CHEST WASHOUT;    Surgeon: Jens Wise MD;  [facility]     IRRIGATION AND DEBRIDEMENT CHEST WASHOUT, COMBINED N/A   5/23/2017    Procedure: COMBINED IRRIGATION AND DEBRIDEMENT CHEST WASHOUT;    COMBINED IRRIGATION AND DEBRIDEMENT CHEST WASHOUT,COMBINED   ESOPHAGOSCOPY, GASTROSCOPY, DUODENOSCOPY (EGD) ;  Surgeon: Nalini Barreto MD;  [facility]     IRRIGATION AND DEBRIDEMENT CHEST WASHOUT, COMBINED N/A   5/24/2017    Procedure: COMBINED IRRIGATION AND DEBRIDEMENT CHEST WASHOUT;    Chest wound Washout and Dressing Change;   Surgeon: Nalini Barreto MD;  [facility]     IRRIGATION AND DEBRIDEMENT CHEST WASHOUT, COMBINED N/A   5/25/2017    Procedure: COMBINED IRRIGATION AND DEBRIDEMENT CHEST WASHOUT;    Irrigation and Debridement Chest Washout and dressing change;    Surgeon: Nalini Barreto MD;  [facility]     IRRIGATION AND DEBRIDEMENT CHEST WASHOUT, COMBINED N/A   5/26/2017    Procedure: COMBINED IRRIGATION AND DEBRIDEMENT CHEST WASHOUT;    Irrigation and Debridement Chest Washout with  dressing change;    Surgeon: Nalini Barreto MD;  [facility]     IRRIGATION AND DEBRIDEMENT CHEST WASHOUT, COMBINED N/A   5/30/2017    Procedure: COMBINED IRRIGATION AND DEBRIDEMENT CHEST WASHOUT;    Irrigation and Debridement Chest Washout , PICC line dressing   change;  Surgeon: Nalini Barreto MD;  [facility]     IRRIGATION AND DEBRIDEMENT CHEST WASHOUT, COMBINED N/A   5/31/2017    Procedure: COMBINED IRRIGATION AND DEBRIDEMENT CHEST WASHOUT;    Irrigation and Debridement Chest Washout ;  Surgeon: Nalini Barreto MD;  [facility]     IRRIGATION AND DEBRIDEMENT CHEST WASHOUT, COMBINED N/A 6/1/2017      Procedure: COMBINED IRRIGATION AND DEBRIDEMENT CHEST WASHOUT;    Chest Wound Irrigation And Debridement with dressing change ;    Surgeon: Nalini Barreto MD;  [facility]     IRRIGATION AND DEBRIDEMENT CHEST WASHOUT, COMBINED N/A 6/4/2017      Procedure: COMBINED IRRIGATION AND DEBRIDEMENT CHEST WASHOUT;    COMBINED IRRIGATION AND DEBRIDEMENT CHEST WASHOUT, Esophagoscopy,   Gastroscopy, Duodenoscopy (EGD) place transendoscopic esophageal   stent,   Pharyngostomy and chest wall tube exchange  C-Arm;    Surgeon: Jens Wise MD;  [facility]     IRRIGATION AND DEBRIDEMENT CHEST WASHOUT, COMBINED N/A 6/2/2017      Procedure: COMBINED IRRIGATION AND DEBRIDEMENT CHEST WASHOUT;    Chest Wound Irrigation and Debridement, Dressing Change;    Surgeon: Nalini Barreto MD;  [facility]     LAPAROSCOPIC ASSISTED  INSERTION TUBE JEJUNOSTOMY N/A 4/17/2017    Procedure: LAPAROSCOPIC ASSISTED INSERTION TUBE JEJUNOSTOMY;;    Surgeon: Jens Wise MD;  [facility]     LAPAROSCOPY DIAGNOSTIC (GENERAL) N/A 1/9/2017    Procedure: LAPAROSCOPY DIAGNOSTIC (GENERAL);  Surgeon: Jens Wise MD;  [facility]     LAPAROSCOPY DIAGNOSTIC (GENERAL) N/A 4/17/2017    Procedure: LAPAROSCOPY DIAGNOSTIC (GENERAL);  Laparoscopic ,   Neck Dissection, Spit Fistula Takedown, Laparoscopic Jejunostomy   Tube and Pharyngostomy Tube, Gastric Pull up, Upper   Endoscopy(EGD)  , Flexible Bronchoscopy ,Sternotomy ;  Surgeon: Jens Wise MD;  [facility]     LUMPECTOMY BREAST Right 2007     NERVE BLOCK PERIPHERAL N/A 8/30/2016    Procedure: NERVE BLOCK PERIPHERAL;  Surgeon: GENERIC ANESTHESIA   PROVIDER;  Location: [facility]     NISSEN FUNDOPLICATION  1/2016     PHARYNGOSTOMY N/A 4/18/2016    Procedure: PHARYNGOSTOMY;  Surgeon: Alexis Barraza MD;  [facility]     PHARYNGOSTOMY N/A 4/25/2016    Procedure: PHARYNGOSTOMY;  Surgeon: Alexis Barraza MD;  [facility]     PHARYNGOSTOMY N/A 5/4/2016    Procedure: PHARYNGOSTOMY;  Surgeon: Alexis Barraza MD;  [facility]     PHARYNGOSTOMY N/A 6/22/2016    Procedure: PHARYNGOSTOMY;  Surgeon: Alexis Barraza MD;  [facility]     PHARYNGOSTOMY N/A 6/29/2016    Procedure: PHARYNGOSTOMY;  Surgeon: Jens Wise MD;  [facility]     PHARYNGOSTOMY N/A 4/21/2017    Procedure: PHARYNGOSTOMY;;  Surgeon: Jens Wise MD;  [facility]     PHARYNGOSTOMY Left 5/31/2017    Procedure: PHARYNGOSTOMY;;  Surgeon: Nalini Barreto MD;    [facility]     PICC INSERTION Left 8/25/2016    5fr DL BioFlo PICC, 42cm (3cm external) in the L medial brachial   vein w/ tip in the SVC RA junction.     PICC INSERTION - Rewire Right 05/31/2017    5fr DL BioFlo PICC, 40cm (6cm external) in the R  "medial brachial   vein w/ tip in the SVC RA junction.     REPAIR FISTULA TRACHEOESOPHAGEAL N/A 9/15/2017    Procedure: REPAIR FISTULA TRACHEOESOPHAGEAL;;  Surgeon: Jens Wise MD;  [facility]     THORACOTOMY Right     lung infection - \"hard crust formed on lung\"     THORACOTOMY Left 2017    Procedure: THORACOTOMY;  Surgeon: Jens Wise MD;    [facility]     WRIST SURGERY              Previous Endoscopy:   No results found for this or any previous visit.         Social History:   Reviewed and edited as appropriate  Social History     Socioeconomic History     Marital status:      Spouse name: neeru     Number of children: 2     Years of education: Not on file     Highest education level: Not on file   Occupational History     Occupation: retired    Social Needs     Financial resource strain: Not on file     Food insecurity     Worry: Not on file     Inability: Not on file     Transportation needs     Medical: Not on file     Non-medical: Not on file   Tobacco Use     Smoking status: Former Smoker     Packs/day: 1.00     Years: 15.00     Pack years: 15.00     Types: Cigarettes     Last attempt to quit: 1998     Years since quittin.6     Smokeless tobacco: Never Used   Substance and Sexual Activity     Alcohol use: No     Alcohol/week: 0.0 standard drinks     Drug use: No     Sexual activity: Not on file   Lifestyle     Physical activity     Days per week: Not on file     Minutes per session: Not on file     Stress: Not on file   Relationships     Social connections     Talks on phone: Not on file     Gets together: Not on file     Attends [employer] service: Not on file     Active member of club or organization: Not on file     Attends meetings of clubs or organizations: Not on file     Relationship status: Not on file     Intimate partner violence     Fear of current or ex partner: Not on file     Emotionally abused: Not on file     " Physically abused: Not on file     Forced sexual activity: Not on file   Other Topics Concern     Not on file   Social History Narrative    Retired realtor.  Lives with  Richard. 2 children alive   and well.            Family History:   Reviewed and edited as appropriate  Family History   Problem Relation Age of Onset     Alzheimer Disease Mother      Cerebrovascular Disease Father         cerebral hemorrhage     Ovarian Cancer Sister      Breast Cancer Daughter      No known history of colorectal cancer, liver disease, or   inflammatory bowel disease.         Allergies:   Reviewed and edited as appropriate     Allergies   Allergen Reactions     Amoxicillin Diarrhea     Ativan [Lorazepam] Other (See Comments)     Hallucinations     Morphine Itching            Medications:     Current Facility-Administered Medications   Medication     acetaminophen (TYLENOL) tablet 650 mg     aspirin EC tablet 81 mg     calcium carbonate (TUMS) chewable tablet 500-1,000 mg     enoxaparin ANTICOAGULANT (LOVENOX) injection 30 mg     gabapentin (NEURONTIN) capsule 400 mg     lidocaine (LMX4) cream     lidocaine 1 % 0.1-1 mL     loperamide (IMODIUM) capsule 2 mg     magnesium sulfate 2 g in water intermittent infusion     magnesium sulfate 4 g in 100 mL sterile water (premade)     melatonin tablet 1 mg     metoprolol succinate (TOPROL-XL) half-tab 12.5 mg     multivitamin w/minerals (THERA-VIT-M) tablet 1 tablet     naloxone (NARCAN) injection 0.1-0.4 mg     omeprazole (priLOSEC) CR capsule 20 mg     ondansetron (ZOFRAN-ODT) ODT tab 4 mg    Or     ondansetron (ZOFRAN) injection 4 mg     oxyCODONE (ROXICODONE) tablet 5 mg     potassium chloride (KLOR-CON) Packet 20-40 mEq     potassium chloride 10 mEq in 100 mL intermittent infusion with   10 mg lidocaine     potassium chloride 10 mEq in 100 mL sterile water intermittent   infusion (premix)     potassium chloride 20 mEq in 50 mL intermittent infusion     potassium chloride ER  (KLOR-CON M) CR tablet 20-40 mEq     simethicone (MYLICON) chewable tablet 80 mg     sodium chloride (PF) 0.9% PF flush 3 mL     sodium chloride (PF) 0.9% PF flush 3 mL             Review of Systems:     A complete 10 point review of systems was performed and is   negative except as noted in the HPI           Physical Exam:   /82 (BP Location: Left arm)   Pulse 92   Temp 96.3  F   (35.7  C) (Oral)   Resp 16   Ht 1.524 m (5')   Wt 42.1 kg (92   lb 14.4 oz)   SpO2 97%   BMI 18.14 kg/m    Wt:   Wt Readings from Last 2 Encounters:   08/02/20 42.1 kg (92 lb 14.4 oz)   06/23/19 48.9 kg (107 lb 12.8 oz)      Constitutional: No acute distress, resting comfortably in bed,   frail appearing  Eyes: Sclera anicteric  Ears/nose/mouth/throat: Moist mucus membranes  Neck: supple  CV: No edema  Respiratory: Breathing comfortably on room air  Abd: Soft, nontender, nondistended, bowel sounds present  Skin: numerous thoracic and abdominal scars including recessed   area at site of prior esophagocutaneous fistula  Neuro: AAO x 3  Psych: Mildly anxious, tearful at times  MSK: Sarcopenic         Data:   Labs and imaging below were independently reviewed and   interpreted    BMP  Recent Labs   Lab 08/03/20  1550 08/02/20  0454 08/02/20  0139 08/01/20  1747 08/01/20  1218 08/01/20  1051   NA  --  138  --   --  136 130*   POTASSIUM 4.0 4.6 6.0* 2.9* 2.5* 8.1*   CHLORIDE  --  106  --   --  93* 93*   ANNABELLE  --  8.6  --   --  8.5 8.5   CO2  --  23  --   --  29 27   BUN  --  9  --   --  11 12   CR  --  0.60  --   --  0.69 Canceled, Test credited   GLC  --  62*  --   --  52* 52*     CBC  Recent Labs   Lab 08/02/20  0454 08/01/20  1051   WBC 7.1 9.3   RBC 2.99* 2.83*   HGB 12.1 11.2*   HCT 37.0 32.0*   * 113*   MCH 40.5* 39.6*   MCHC 32.7 35.0   RDW 17.0* 16.1*    228     INR  [Narrative was truncated due to length]   Thoracic Surgery Adult IP Consult: Patient to be seen: Routine within 24 hrs; Call back #: [phone] /  Cell [phone]; 76 y/o female s/p toupet fundoplication for hiatal hernia with complicated post-operative course here with stricture - very wel...     Status: None ()    Samira Weinstein DO     8/4/2020  3:19 PM      Thoracic Surgery Consultation    Minerva Blanco  MRN#: [id number]    Date of Admission:  8/1/2020    Date of Consult: 8/4/2020    Reason for consult: Progressive dysphagia       Requesting service: Gold       Requesting provider: Dr. Ruslan Leblanc                   Assessment and Plan:   Assessment:    75 year old female with PMH breast cancer, CAD s/p PCI, PAF,   CHF, HTN, MS, fibromyalgia, IBS, GERD, hx lap hiatal hernia with   Toupet c/b mediastinal phlegmon s/p esophageal stents, s/p   esophagectomy with spit fistula creation (1/2017), s/p   retrosternal gastric pullup (4/2017) c/b esophagocutaneous   fistula s/p cauterizaion of fistula (11/2017) s/p esophageal   stent removal 2/2018 and resolution of her fistula. She is   admitted this hospitalization with weakness and failure to   thrive. Thoracic consulted for concern for progressive dysphagia   and possible stricture. Patient reports 3 weeks of poor oral   intake and reflux symptoms. Upper GI with SBFT concerning for   possible midthoracic esophageal stricture.         Plan:   - No indication for acute surgical intervention  - Recommend XR esophagram    Discussed with Fellow Dr. Beth.    Samira Wolff DO  General Surgery, PGY3              Chief Complaint:   Dysphagia         History of Present Illness:   Minerva Blanco is a 75 year old female with PMH breast cancer,   CAD s/p PCI, PAF, CHF, HTN, MS, fibromyalgia, IBS, GERD, hx lap   hiatal hernia with Toupet c/b mediastinal phlegmon s/p esophageal   stents, s/p esophagectomy with spit fistula creation (1/2017),   s/p retrosternal gastric pullup (4/2017) c/b esophagocutaneous   fistula s/p cauterizaion of fistula (11/2017) s/p esophageal   stent removal 2/2018 and  "resolution of her fistula. She is   admitted this hospitalization with weakness and failure to   thrive. Thoracic consulted for concern for progressive dysphagia   and possible stricture.     Patient reports she had a \"voracious\" appetite up until 3 weeks   ago. Had been tolerating regular food without issue. Does have   long-standing reflux for which she takes a PPI prior to meals   three times daily. She notes that certain foods aggravate her   reflux such as acidic foods, carbonated beverages, and spicy   foods so she tries to avoid these. Symptoms had been tolerable   however over the last 3 weeks she has been extremely fatigues and   has had no appetite. Denies nausea. Does report that within the   same three weeks she has had increased burping throughout the   day. She has a burning/stinging sensation in her chest with her   reflux as certain foods go down. She also endorses sensation of   spit refluxing in her throat coming up from her stomach.   Occasional abdominal bloating with certain meals.  Denies any   feelings of food getting stuck in her throat. tolerates breads   and solid foods without issue. Denies any trouble swallowing.   Denies any sensation of choking with drinking liquids. Having   normal BMs for her every other day in the setting of her IBS and   taking imodium.     Today she reports her appetite is improved and back to eating   well. Energy is also improved. On admit noted to have abnormal   LFTs, hypokalemia, troponemia, and Type II demand MI. Albumin   2.4. Upper GI with SBFT concerning for possible midthoracic   esophageal stricture.            Past Medical History:     Past Medical History:   Diagnosis Date     Breast cancer (H) 2007    right side - lumpectomy, chemo, and local radiation     Chronic infection     infection/wound for two years after esoph surgery     Compression fracture of spine (H)     T12-L1     Coronary artery disease 04/2018    s/p PCI with ADOLFO to proximal and mid " RCA     Esophageal perforation      Fibromyalgia      GERD (gastroesophageal reflux disease)      Hiatal hernia      IBS (irritable bowel syndrome)      Meniere's disease     deaf left ear     Multiple sclerosis (H)      Noninfectious ileitis      Other chronic pain      Paroxysmal atrial fibrillation (H)              Past Surgical History:     Past Surgical History:   Procedure Laterality Date     APPENDECTOMY       BACK SURGERY  5/1/2015    lumbar fusion     BRONCHOSCOPY FLEXIBLE N/A 4/17/2017    Procedure: BRONCHOSCOPY FLEXIBLE;;  Surgeon: Jens Wise MD;  [facility]     C GASTROSTOMY/JEJUN TUBE      J tube for feedings     CLOSE SPIT FISTULA N/A 4/17/2017    Procedure: CLOSE SPIT FISTULA;;  Surgeon: Jens Wise MD;  [facility]     COMBINED ESOPHAGOSCOPY, GASTROSCOPY, DUODENOSCOPY (EGD), REMOVE   ESOPHAGEAL STENT N/A 8/26/2016    Procedure: COMBINED ESOPHAGOSCOPY, GASTROSCOPY, DUODENOSCOPY   (EGD), REMOVE ESOPHAGEAL STENT;  Surgeon: Jens Wise MD;  [facility]     COMBINED ESOPHAGOSCOPY, GASTROSCOPY, DUODENOSCOPY (EGD), REMOVE   ESOPHAGEAL STENT N/A 2/12/2018    Procedure: COMBINED ESOPHAGOSCOPY, GASTROSCOPY, DUODENOSCOPY   (EGD), REMOVE ESOPHAGEAL STENT;  Esophagogastroduodenoscopy With   Stent Removal;  Surgeon: Jens Wise MD;  [facility]     COMBINED ESOPHAGOSCOPY, GASTROSCOPY, DUODENOSCOPY (EGD),   REPLACE ESOPHAGEAL STENT N/A 8/25/2017    Procedure: COMBINED ESOPHAGOSCOPY, GASTROSCOPY, DUODENOSCOPY   (EGD), REPLACE ESOPHAGEAL STENT;;  Surgeon: Jens Wise MD;  [facility]     COMBINED ESOPHAGOSCOPY, GASTROSCOPY, DUODENOSCOPY (EGD),   REPLACE ESOPHAGEAL STENT N/A 9/15/2017    Procedure: COMBINED ESOPHAGOSCOPY, GASTROSCOPY, DUODENOSCOPY   (EGD), REPLACE ESOPHAGEAL STENT;  Upper Endoscopy with Stent   Exchange, Cauterization of Tracheosophageal Fistula,   Cauterization of Chest Wound   ;  Surgeon: Frandy  Jens Saul MD;  [facility]     COMBINED ESOPHAGOSCOPY, GASTROSCOPY, DUODENOSCOPY (EGD),   REPLACE ESOPHAGEAL STENT N/A 10/20/2017    Procedure: COMBINED ESOPHAGOSCOPY, GASTROSCOPY, DUODENOSCOPY   (EGD), REPLACE ESOPHAGEAL STENT;  Upper Endoscopy with Stent   Exchange, Cauterization Tracheosphageal Fistula Tract;  Surgeon:   Nalini Barreto MD;  [facility]     COMBINED ESOPHAGOSCOPY, GASTROSCOPY, DUODENOSCOPY (EGD),   REPLACE ESOPHAGEAL STENT N/A 11/3/2017    Procedure: COMBINED ESOPHAGOSCOPY, GASTROSCOPY, DUODENOSCOPY   (EGD), REPLACE ESOPHAGEAL STENT;  Esophagogastroduodenoscopy,   Stent Revision, Cauterization Of Traceoesophageal Fistula and   stent exchange;  Surgeon: Nalini Barreto MD;  [facility]     COMBINED ESOPHAGOSCOPY, GASTROSCOPY, DUODENOSCOPY (EGD),   REPLACE ESOPHAGEAL STENT N/A 11/17/2017    Procedure: COMBINED ESOPHAGOSCOPY, GASTROSCOPY, DUODENOSCOPY   (EGD), REPLACE ESOPHAGEAL STENT;  Esophagogastroduodenoscopy,   Esophogeal Stent Removal, Esophogeal Stent Placement (23x100   EndoMAXX), Cauterization Of Fistula Tract;  Surgeon: Nalini Barreto MD;  [facility]     CREATE SPIT FISTULA N/A 1/9/2017    Procedure: CREATE SPIT FISTULA;  Surgeon: Jens Wise MD;  [facility]     ESOPHAGECTOMY N/A 1/9/2017    Procedure: ESOPHAGECTOMY;  Surgeon: Jens Wise MD;  [facility]     ESOPHAGOSCOPY, GASTROSCOPY, DUODENOSCOPY (EGD), COMBINED N/A   4/18/2016    Procedure: COMBINED ESOPHAGOSCOPY, GASTROSCOPY, DUODENOSCOPY   (EGD);  Surgeon: Alexis Barraza MD;  [facility]     ESOPHAGOSCOPY, GASTROSCOPY, DUODENOSCOPY (EGD), COMBINED N/A   4/25/2016    Procedure: COMBINED ESOPHAGOSCOPY, GASTROSCOPY, DUODENOSCOPY   (EGD);  Surgeon: Alexis Barraza MD;  [facility]     ESOPHAGOSCOPY, GASTROSCOPY, DUODENOSCOPY (EGD), COMBINED N/A   5/4/2016    Procedure: COMBINED ESOPHAGOSCOPY, GASTROSCOPY, DUODENOSCOPY   (EGD);  Surgeon:  Alexis Barraza MD;  [facility]     ESOPHAGOSCOPY, GASTROSCOPY, DUODENOSCOPY (EGD), COMBINED N/A   5/18/2016    Procedure: COMBINED ESOPHAGOSCOPY, GASTROSCOPY, DUODENOSCOPY   (EGD);  Surgeon: Alexis Barraza MD;  [facility]     ESOPHAGOSCOPY, GASTROSCOPY, DUODENOSCOPY (EGD), COMBINED N/A   6/22/2016    Procedure: COMBINED ESOPHAGOSCOPY, GASTROSCOPY, DUODENOSCOPY   (EGD);  Surgeon: Alexis Barraza MD;  [facility]     ESOPHAGOSCOPY, GASTROSCOPY, DUODENOSCOPY (EGD), COMBINED N/A   7/12/2016    Procedure: COMBINED ESOPHAGOSCOPY, GASTROSCOPY, DUODENOSCOPY   (EGD);  Surgeon: Jens Wise MD;  [facility]     ESOPHAGOSCOPY, GASTROSCOPY, DUODENOSCOPY (EGD), COMBINED N/A   4/21/2017    Procedure: COMBINED ESOPHAGOSCOPY, GASTROSCOPY, DUODENOSCOPY   (EGD);  Esophagogastroduodenoscopy, Pharyngostomy Tube Placement   ;  Surgeon: Jens Wise MD;  [facility]     ESOPHAGOSCOPY, GASTROSCOPY, DUODENOSCOPY (EGD), COMBINED N/A   5/19/2017    Procedure: COMBINED ESOPHAGOSCOPY, GASTROSCOPY, DUODENOSCOPY   (EGD);  Upper Endoscopy, Irrigation and Debridement of Chest   Wound ;  Surgeon: Nalini Barreto MD;  [facility]     ESOPHAGOSCOPY, GASTROSCOPY, DUODENOSCOPY (EGD), COMBINED N/A   5/23/2017    Procedure: COMBINED ESOPHAGOSCOPY, GASTROSCOPY, DUODENOSCOPY   (EGD);;  Surgeon: Nalini Barreto MD;  [facility]     ESOPHAGOSCOPY, GASTROSCOPY, DUODENOSCOPY (EGD), COMBINED N/A   6/27/2017    Procedure: COMBINED ESOPHAGOSCOPY, GASTROSCOPY, DUODENOSCOPY   (EGD);  Esophagogastroduodenoscopy With Removal And Replacement   Of Esophageal Stent exchange. Exchange of pharyngtomy tube. Chest   wound dressing change;  Surgeon: Nalini Barreto MD;  [facility]     ESOPHAGOSCOPY, GASTROSCOPY, DUODENOSCOPY (EGD), COMBINED N/A   8/4/2017    Procedure: COMBINED ESOPHAGOSCOPY, GASTROSCOPY, DUODENOSCOPY   (EGD);  Esophagogastroduodenoscopy, Stent Removal and Stent    Replacement. Dressing Change ;  Surgeon: Nalini Barreto MD;    [facility]     ESOPHAGOSCOPY, GASTROSCOPY, DUODENOSCOPY (EGD), COMBINED N/A   8/25/2017    Procedure: COMBINED ESOPHAGOSCOPY, GASTROSCOPY, DUODENOSCOPY   (EGD);  Esophagogastroduodenoscopy,  Stent Revision,    Cauterization;  Surgeon: Jens Wise MD;  [facility]     ESOPHAGOSCOPY, GASTROSCOPY, DUODENOSCOPY (EGD), COMBINED N/A   10/2/2017    Procedure: COMBINED ESOPHAGOSCOPY, GASTROSCOPY, DUODENOSCOPY   (EGD);  Esophagogastroduodenostomy, Replacement of Esophageal   Stent, Cauterization of Tracheosophageal Fistula anc chest wall,     C-arm;  Surgeon: Nalini Barreto MD;  [facility]     ESOPHAGOSCOPY, GASTROSCOPY, DUODENOSCOPY (EGD), DILATATION,   COMBINED N/A 6/29/2016    Procedure: COMBINED ESOPHAGOSCOPY, GASTROSCOPY, DUODENOSCOPY   (EGD), DILATATION;  Surgeon: Jens Wise MD;    [facility]     EXPLORE NECK N/A 5/19/2017    Procedure: EXPLORE NECK;;  Surgeon: Nalini Barreto MD;    [facility]     HC UGI ENDOSCOPY W TRANSENDOSCOPIC STENT PLACEMENT N/A   7/12/2016    Procedure: COMBINED ESOPHAGOSCOPY, GASTROSCOPY, DUODENOSCOPY   (EGD), PLACE TRANSENDOSCOPIC ESOPHAGEAL STENT;  Surgeon: Jens Wise MD;  [facility]     HC UGI ENDOSCOPY W TRANSENDOSCOPIC STENT PLACEMENT N/A   7/22/2016    Procedure: COMBINED ESOPHAGOSCOPY, GASTROSCOPY, DUODENOSCOPY   (EGD), PLACE TRANSENDOSCOPIC ESOPHAGEAL STENT;  Surgeon: Jens Wise MD;  [facility]     INCISION AND DRAINAGE CHEST WASHOUT, COMBINED N/A 5/27/2017    Procedure: COMBINED INCISION AND DRAINAGE CHEST WASHOUT;  Chest   washout;  Surgeon: Nalini Barreto MD;  [facility]     INCISION AND DRAINAGE CHEST WASHOUT, COMBINED N/A 5/28/2017    Procedure: COMBINED INCISION AND DRAINAGE CHEST WASHOUT;  Chest   washout;  Surgeon: Nalini Barreto MD;  [facility]     INCISION AND DRAINAGE CHEST WASHOUT, COMBINED N/A  6/9/2017    Procedure: COMBINED INCISION AND DRAINAGE CHEST WASHOUT;  Chest   washout and dressing change, Esophaogastroduodenoscopy;  Surgeon:   Jens Wise MD;  [facility]     INCISION AND DRAINAGE CHEST WASHOUT, COMBINED N/A 7/17/2017    Procedure: COMBINED INCISION AND DRAINAGE CHEST WASHOUT;    Incision and Drainage of right Chest Wound,   Esophagogastroduodenoscopy with stent exchange. pharangostomy   tube revision;  Surgeon: Jens Wise MD;  [facility]     IRRIGATION AND DEBRIDEMENT CHEST WASHOUT, COMBINED N/A   6/29/2016    Procedure: COMBINED IRRIGATION AND DEBRIDEMENT CHEST WASHOUT;    Surgeon: Jens Wise MD;  [facility]     IRRIGATION AND DEBRIDEMENT CHEST WASHOUT, COMBINED N/A   5/23/2017    Procedure: COMBINED IRRIGATION AND DEBRIDEMENT CHEST WASHOUT;    COMBINED IRRIGATION AND DEBRIDEMENT CHEST WASHOUT,COMBINED   ESOPHAGOSCOPY, GASTROSCOPY, DUODENOSCOPY (EGD) ;  Surgeon: Nalini Barreto MD;  [facility]     IRRIGATION AND DEBRIDEMENT CHEST WASHOUT, COMBINED N/A   5/24/2017    Procedure: COMBINED IRRIGATION AND DEBRIDEMENT CHEST WASHOUT;    Chest wound Washout and Dressing Change;  Surgeon: Nalini Barreto MD;  [facility]     IRRIGATION AND DEBRIDEMENT CHEST WASHOUT, COMBINED N/A   5/25/2017    Procedure: COMBINED IRRIGATION AND DEBRIDEMENT CHEST WASHOUT;    Irrigation and Debridement Chest Washout and dressing change;    Surgeon: Nalini Barreto MD;  [facility]     IRRIGATION AND DEBRIDEMENT CHEST WASHOUT, COMBINED N/A   5/26/2017    Procedure: COMBINED IRRIGATION AND DEBRIDEMENT CHEST WASHOUT;    Irrigation and Debridement Chest Washout with  dressing change;    Surgeon: Nalini Barreto MD;  [facility]     IRRIGATION AND DEBRIDEMENT CHEST WASHOUT, COMBINED N/A   5/30/2017    Procedure: COMBINED IRRIGATION AND DEBRIDEMENT CHEST WASHOUT;    Irrigation and Debridement Chest Washout , PICC line dressing   change;  Surgeon:  Nalini Barreto MD;  [facility]     IRRIGATION AND DEBRIDEMENT CHEST WASHOUT, COMBINED N/A   5/31/2017    Procedure: COMBINED IRRIGATION AND DEBRIDEMENT CHEST WASHOUT;    Irrigation and Debridement Chest Washout ;  Surgeon: Nalini Barreto MD;  [facility]     IRRIGATION AND DEBRIDEMENT CHEST WASHOUT, COMBINED N/A 6/1/2017      Procedure: COMBINED IRRIGATION AND DEBRIDEMENT CHEST WASHOUT;    Chest Wound Irrigation And Debridement with dressing change ;    Surgeon: Nalini Barreto MD;  [facility]     IRRIGATION AND DEBRIDEMENT CHEST WASHOUT, COMBINED N/A 6/4/2017      Procedure: COMBINED IRRIGATION AND DEBRIDEMENT CHEST WASHOUT;    COMBINED IRRIGATION AND DEBRIDEMENT CHEST WASHOUT, Esophagoscopy,   Gastroscopy, Duodenoscopy (EGD) place transendoscopic esophageal   stent,   Pharyngostomy and chest wall tube exchange  C-Arm;    Surgeon: Jens Wise MD;  [facility]     IRRIGATION AND DEBRIDEMENT CHEST WASHOUT, COMBINED N/A 6/2/2017      Procedure: COMBINED IRRIGATION AND DEBRIDEMENT CHEST WASHOUT;    Chest Wound Irrigation and Debridement, Dressing Change;    Surgeon: Nalini Barreto MD;  [facility]     LAPAROSCOPIC ASSISTED INSERTION TUBE JEJUNOSTOMY N/A 4/17/2017    Procedure: LAPAROSCOPIC ASSISTED INSERTION TUBE JEJUNOSTOMY;;    Surgeon: Jens Wise MD;  [facility]     LAPAROSCOPY DIAGNOSTIC (GENERAL) N/A 1/9/2017    Procedure: LAPAROSCOPY DIAGNOSTIC (GENERAL);  Surgeon: Jens Wise MD;  [facility]     LAPAROSCOPY DIAGNOSTIC (GENERAL) N/A 4/17/2017    Procedure: LAPAROSCOPY DIAGNOSTIC (GENERAL);  Laparoscopic ,   Neck Dissection, Spit Fistula Takedown, Laparoscopic Jejunostomy   Tube and Pharyngostomy Tube, Gastric Pull up, Upper   Endoscopy(EGD)  , Flexible Bronchoscopy ,Sternotomy ;  Surgeon: Jens Wise MD;  [facility]     LUMPECTOMY BREAST Right 2007     NERVE BLOCK PERIPHERAL N/A 8/30/2016    Procedure: NERVE  "BLOCK PERIPHERAL;  Surgeon: GENERIC ANESTHESIA   PROVIDER;  [facility]: [facility]     NISSEN FUNDOPLICATION  2016     PHARYNGOSTOMY N/A 2016    Procedure: PHARYNGOSTOMY;  Surgeon: Alexis Barraza MD;  [facility]     PHARYNGOSTOMY N/A 2016    Procedure: PHARYNGOSTOMY;  Surgeon: Alexis Barraza MD;  [facility]     PHARYNGOSTOMY N/A 2016    Procedure: PHARYNGOSTOMY;  Surgeon: Alexis Barraza MD;  [facility]     PHARYNGOSTOMY N/A 2016    Procedure: PHARYNGOSTOMY;  Surgeon: Alexis Barraza MD;  [facility]     PHARYNGOSTOMY N/A 2016    Procedure: PHARYNGOSTOMY;  Surgeon: Jens Wise MD;  [facility]     PHARYNGOSTOMY N/A 2017    Procedure: PHARYNGOSTOMY;;  Surgeon: Jens Wise MD;  [facility]     PHARYNGOSTOMY Left 2017    Procedure: PHARYNGOSTOMY;;  Surgeon: Nalini Barreto MD;    [facility]     PICC INSERTION Left 2016    5fr DL BioFlo PICC, 42cm (3cm external) in the L medial brachial   vein w/ tip in the SVC RA junction.     PICC INSERTION - Rewire Right 2017    5fr DL BioFlo PICC, 40cm (6cm external) in the R medial brachial   vein w/ tip in the SVC RA junction.     REPAIR FISTULA TRACHEOESOPHAGEAL N/A 9/15/2017    Procedure: REPAIR FISTULA TRACHEOESOPHAGEAL;;  Surgeon: Jens Wise MD;  [facility]     THORACOTOMY Right     lung infection - \"hard crust formed on lung\"     THORACOTOMY Left 2017    Procedure: THORACOTOMY;  Surgeon: Jens Wise MD;    [facility]     WRIST SURGERY               Social History:       Social History     Tobacco Use     Smoking status: Former Smoker     Packs/day: 1.00     Years: 15.00     Pack years: 15.00     Types: Cigarettes     Last attempt to quit: 1998     Years since quittin.6     Smokeless tobacco: Never Used   Substance Use Topics     Alcohol use: No     Alcohol/week: " 0.0 standard drinks             Family History:     Negative for bleeding disorders, clotting disorders, or problems   with anesthesia.    Family History   Problem Relation Age of Onset     Alzheimer Disease Mother      Cerebrovascular Disease Father         cerebral hemorrhage     Ovarian Cancer Sister      Breast Cancer Daughter                 Allergies:     Allergies   Allergen Reactions     Amoxicillin Diarrhea     Ativan [Lorazepam] Other (See Comments)     Hallucinations     Morphine Itching             Medications:     No current facility-administered medications on file prior to   encounter.   Acetaminophen (TYLENOL EXTRA STRENGTH PO), Take 1,000 mg by mouth   3 times daily And 500 mg by mouth 2 times daily as needed  aspirin 81 MG EC tablet, Take 81 mg by mouth daily  doxepin (SINEQUAN) 10 MG capsule, Take 20 mg by mouth At Bedtime   Prescription for 10mg-50mg at bedtime but patient reports she   takes 20mg daily - she had hallucinations with 40mg dose.  furosemide (LASIX) 40 MG tablet, Take 40 mg by mouth every   morning  gabapentin (NEURONTIN) 100 MG capsule, Take 400 mg by mouth At   Bedtime  loperamide (IMODIUM A-D) 2 MG tablet, Take 2 mg by mouth daily as   needed Give 4mg with first loose stool AND Give 2 mg by mouth as   needed for Loose Stools 2 mg with each subsequent loose stool   episode after initial 4 mg first dose. NOT TO EXCEED 16 MG IN 24  MELATONIN PO, Take 10 mg by mouth At Bedtime   metoprolol succinate ER (TOPROL-XL) 25 MG 24 hr tablet, Take 12.5   mg by mouth every morning  omeprazole (PRILOSEC) 20 MG DR capsule, Take 1 capsule by mouth 3   times daily before meals and at bedtime as needed.  Pediatric Multivit-Minerals-C (CHEWABLES MULTIVITAMIN PO), Take 1   tablet by mouth daily   tiZANidine (ZANAFLEX) 4 MG tablet, Take 4 mg by mouth At Bedtime  vitamin D2 (ERGOCALCIFEROL) 36561 units (1250 mcg) capsule, Take   50,000 Units by mouth once a week  OTHER MEDICAL SUPPLIES, every morning  Daily weights.  OTHER MEDICAL SUPPLIES, 4 times daily BP checks qid.  OTHER MEDICAL SUPPLIES, Legs: Black socks and Compriflex LEs.   Thigh high wraps. Leave on at all times. If soiled may remove   thigh portions.    every shift              Review of Systems:   10-point ROS otherwise negative except as noted above.          Physical Exam:     Temp:  [95.6  F (35.3  C)-96.3  F (35.7  C)] 95.8  F (35.4  C)  Heart Rate:  [88-96] 90  Resp:  [14-16] 14  BP: ()/(65-82) 98/65  SpO2:  [96 %-98 %] 96 %     General: AAOx4, NAD, lying comfortably in bed  CV: regular rate and rhythm, warm, well-perfused, notable prior   esophagocutaneous fistula scarring on chest wall  Pulm: no dyspnea, breathing comfortably on RA  Abd: soft, non-distended, non-tender  Extremities: no edema  Neuro: moving all extremities spontaneously without apparent   deficit    No intake/output data recorded.          Data:   Labs:  Arterial Blood Gases   No lab results found in last 7 days.     Complete Blood Count   Recent Labs   Lab 08/04/20  0732 08/02/20  0454 08/01/20  1051   WBC 5.1 7.1 9.3   HGB 11.3* 12.1 11.2*    201 228       Basic Metabolic Panel  Recent Labs   Lab 08/04/20  0732 08/03/20  1550 08/02/20  0454 08/02/20  0139 08/01/20  1747 08/01/20  1218 08/01/20  1051     --  138  --   --  136 130*   POTASSIUM 3.7 4.0 4.6 6.0* 2.9* 2.5* 8.1*   CHLORIDE 109  --  106  --   --  93* 93*   CO2 24  --  23  --   --  29 27   BUN 5*  --  9  --   --  11 12   CR 0.50*  --  0.60  --   --  0.69 Canceled, Test credited   GLC 75  --  62*  --   --  52* 52*   ANNABELLE 7.9*  --  8.6  --   --  8.5 8.5   MAG 2.2 1.7  --   --  1.8  --   --    PHOS 1.7*  --   --   --  1.7*  --   --        Liver Function Tests  Recent Labs   Lab 08/04/20  0732 08/02/20  0454 08/01/20  1218 08/01/20  1051   AST 88* 114* 76* Canceled, Test credited   ALT 71* 76* 65* 83*   ALKPHOS 370* 417* 408* 386*   BILITOTAL 1.6* 1.9* 1.9* 2.5*   ALBUMIN 2.4* 2.6* 2.5* 2.4*        Pancreatic Enzymes  Recent Labs   Lab 08/01/20  1051   LIPASE <10*       Coagulation Profile  Recent Labs   Lab 08/01/20  1051   INR 1.10       Lactate  Recent Labs   Lab 08/01/20  1051   LACT 1.5       Imaging:   Results for orders placed or performed during the hospital   encounter of 08/01/20   Head CT w/o contrast    Narrative    CT HEAD W/O CONTRAST 8/1/2020 11:21 AM    History: Fall hit back of head.    Per EPIC: Complaining of?a?fall and generalized weakness. Patient  reports she fell at approximately 0830 am?,?when her legs buckled   out  from underneath her, hitting the back of her head on the carpet.?    Comparison: No similar previous study.    Technique: Using multidetector thin collimation helical   acquisition  technique, axial, coronal and sagittal CT images from the skull   base  to the vertex were obtained without intravenous contrast.    Findings:     No intracranial hemorrhage, mass effect, or midline shift.   Gray/white  matter differentiation in both cerebral hemispheres is preserved.  Moderate parenchymal volume loss of the frontal and temporal   lobes.  Patchy periventricular hypoattenuation, most likely represents   chronic  small vessel ischemic disease. Ventricles are proportionate to   the  cerebral sulci. The basal cisterns are clear.    The bony calvaria and the bones of the skull base are normal. The  visualized portions of the paranasal sinuses and mastoid air   cells are  clear. Right parietal scalp edema.      Impression    Impression:  1. No acute intracranial pathology.   2. Frontotemporal cerebral atrophy. Mild chronic small vessel   ischemic  disease.    I have personally reviewed the examination and initial   interpretation  and I agree with the findings.    WILLIAM HEDIRCK MD   Cervical spine CT w/o contrast    Narrative    CT CERVICAL SPINE W/O CONTRAST 8/1/2020 11:36 AM    Provided History: Fall posterior neck pain    Comparison: No similar previous study. Chest CT  2017.    Technique: Using multidetector thin collimation helical   acquisition  technique, axial, coronal and sagittal CT images through the   cervical  spine were obtained without intravenous contrast.     Findings:  No acute fracture or traumatic subluxation. No prevertebral   edema.  Straightening of cervical lordosis. Degenerative grade 1  anterolisthesis C4 over C5 and C7 over T1. Disc space narrowing   and  endplate degenerative changes at C5-T1. Multilevel uncovertebral  spurring and facet arthropathy. Multilevel cervical spondylosis,   most  pronounced with left C3-4 severe neural foraminal narrowing. No  high-grade spinal canal narrowing at any level. No abnormality of   the  paraspinous soft tissues.    Atherosclerotic calcification of both carotid bifurcations.    [Narrative was truncated due to length]   Echo Complete     Status: None    Narrative    [id number]  [id number]  [id number]  [id number]^ANNALEE^EUGENIO^[facility],[place]  Echocardiography Laboratory  [address] [place] [id number]     Name: FAVIAN MALONE  MRN: [id number]  : 1945  Study Date: 2020 11:26 AM  Age: 75 yrs  Gender: Female  Patient Location: [facility]  Reason For Study: Dyspnea  Ordering Physician: EUGENIO MANTILLA  Performed By: Nicky Mejia RDCS     BSA: 1.3 m2  Height: 60 in  Weight: 93 lb  HR: 82  BP: 127/64 mmHg  _____________________________________________________________________________  __        Procedure  Echocardiogram with two-dimensional, color and spectral Doppler performed.  Contrast Optison. Optison (NDC #6504-2390-26) given intravenously. Patient was  given 5 ml mixture of 3 ml Optison and 6 ml saline. 4 ml wasted.  _____________________________________________________________________________  __        Interpretation Summary  Global and regional left ventricular function is normal with an EF of 60-65%.  Right ventricular function, chamber size, wall  motion, and thickness are  normal.  Moderate tricuspid insufficiency is present.  Mild pulmonary hypertension is present.  The inferior vena cava cannot be assessed.  No pericardial effusion is present.  No significant change from prior.  _____________________________________________________________________________  __        Left Ventricle  Left ventricular wall thickness is normal. Left ventricular size is normal.  Global and regional left ventricular function is normal with an EF of 60-65%.  Grade II or moderate diastolic dysfunction.     Right Ventricle  Right ventricular function, chamber size, wall motion, and thickness are  normal.     Atria  Moderate biatrial enlargement is present.     Mitral Valve  The mitral valve is normal.        Aortic Valve  The aortic valve is tricuspid. Moderate aortic valve calcification is present.  Trace to mild aortic insufficiency is present.     Tricuspid Valve  Moderate tricuspid insufficiency is present. The right ventricular systolic  pressure is approximated at 42.8 mmHg plus the right atrial pressure. Mild  pulmonary hypertension is present.     Pulmonic Valve  On Doppler interrogation, there is no significant stenosis or regurgitation.     Vessels  The thoracic aorta is normal. The inferior vena cava cannot be assessed.     Pericardium  No pericardial effusion is present.     _____________________________________________________________________________  __  MMode/2D Measurements & Calculations  IVSd: 0.60 cm  LVIDd: 3.1 cm  LVIDs: 2.4 cm  LVPWd: 0.81 cm  FS: 24.2 %     LV mass(C)d: 53.2 grams  LV mass(C)dI: 39.4 grams/m2  asc Aorta Diam: 3.1 cm  LVOT diam: 1.9 cm  LVOT area: 2.8 cm2  LA Volume (BP): 57.2 ml  LA Volume Index (BP): 42.4 ml/m2  RWT: 0.51        Doppler Measurements & Calculations  MV E max jorge: 101.0 cm/sec  MV A max jorge: 89.7 cm/sec  MV E/A: 1.1  MV dec slope: 540.0 cm/sec2     TR max jorge: 327.0 cm/sec  TR max P.8 mmHg  E/E' av.0  Lateral E/e':  11.8  [facility] E/e': 14.3     _____________________________________________________________________________  __           Report approved by: Anjelica Chatman 08/02/2020 01:14 PM           No results found for this or any previous visit (from the past 48 hour(s)).             Pending Results:     Unresulted Labs Ordered in the Past 30 Days of this Admission     No orders found from 7/2/2020 to 8/2/2020.                  Discharge Instructions and Follow-Up:     Discharge Procedure Orders   Home care nursing referral   Referral Priority: Routine Referral Type: Home Health Therapies & Aides   Number of Visits Requested: 1     MD face to face encounter   Order Comments: Documentation of Face to Face and Certification for Home Health Services    I certify that patient: Minerva Blanco is under my care and that I, or a nurse practitioner or physician's assistant working with me, had a face-to-face encounter that meets the physician face-to-face encounter requirements with this patient on: 8/3/2020.    This encounter with the patient was in whole, or in part, for the following medical condition, which is the primary reason for home health care: history of breast cancer, CAD s/p PCI (4/2018), PAF, CHF, HTN, HLD, MS, fibromyalgia, IBS, GERD, esophageal perforation, and malnutrition who presents with progressive generalized weakness x 3-4 weeks, culminating in fall.    I certify that, based on my findings, the following services are medically necessary home health services: Nursing, Occupational Therapy and Physical Therapy.    My clinical findings support the need for the above services because: Nurse is needed: To provide assessment and oversight required in the home to assure adherence to the medical plan due to: fall/weakness, home safety eval, medication management. Occupational Therapy Services are needed to assess and treat cognitive ability and address ADL safety due to impairment in Pt appears to be progress  back toward baseline, would benefit from HHPT to improve activity tolerance and functional mobility. Physical Therapy Services are needed to assess and treat the following functional impairments: Pt will benefit from HHOT as pt is pt requires assistive device, assistance from an additional person, and would require taxing amount of energy to leave home environment, therefore appropriate for HH therapy..    Further, I certify that my clinical findings support that this patient is homebound (i.e. absences from home require considerable and taxing effort and are for medical reasons or Jew services or infrequently or of short duration when for other reasons) because: Leaving home is medically contraindicated for the following reason(s): Infection risk / immunocompromised state where it is safer for them to receive services in the home...    Based on the above findings. I certify that this patient is confined to the home and needs intermittent skilled nursing care, physical therapy and/or speech therapy.  The patient is under my care, and I have initiated the establishment of the plan of care.  This patient will be followed by a physician who will periodically review the plan of care.  Physician/Provider to provide follow up care: Andrea Nino    Attending hospital physician (the Medicare certified [place] provider): Jd Zambrano*  Physician Signature: See electronic signature associated with these discharge orders.  Date: 8/3/2020     Reason for your hospital stay   Order Comments: You were hospitalized for decrease oral intake , weakness and fall. You have evidence of malnutrition , it is very important that you try to maintain you nutrition as much as possible. If your weakness/poor appetite returns call you PCP early to avoid further malnutrition. There was no evidence of blockages in your esophagus.   We also found that you have elevated liver enzymes , we would like for you to follow up with a  liver specialist and have a repeat MRI of the liver.   It was our pleasure taking care of you please call [phone] and ask for [phone] if you have any questions.     Adult [facility] Follow-up and recommended labs and tests   Order Comments: Follow up with primary care provider, Andrea Nino, within 7 days for hospital follow- up.  No follow up labs or test are needed.      Appointments on [place] and/or [facility] (with [facility] or [facility] provider or service). Call [phone] if you haven't heard regarding these appointments within 7 days of discharge.     Activity   Order Comments: Your activity upon discharge: activity as tolerated     Order Specific Question Answer Comments   Is discharge order? Yes      Discharge Instructions   Order Comments: Start taking lasix at a lower dose 20 mg if you have worsening leg swelling   If you start taking lasix take potassium 20 meq with it daily   Follow up with liver specialist     Full Code     Order Specific Question Answer Comments   Code status determined by: Discussion with patient/ legal decision maker      Diet   Order Comments: Follow this diet upon discharge: Orders Placed This Encounter      Combination Diet Regular Diet Adult     Order Specific Question Answer Comments   Is discharge order? Yes             Date of service: 8/7/2020     >30 minutes spent in discharge, including >50% in counseling and coordination of care, medication review and plan of care recommended on follow up. Questions were answered.   It was our pleasure to care for Ms. Blanco. Please do not hesitate to contact me should there be questions regarding the hospital course or discharge plan.      Regis Leblanc MD  Internal Medicine Hospitalist & Staff Physician  [facility]  Pager: [phone]
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
## 2020-08-17 NOTE — ED PROVIDER NOTES
History     Chief Complaint   Patient presents with     Generalized Weakness     HPI  Minerva Blanco is a 75 year old female with a past medical history of multiple sclerosis, CAD s/p PCI with ADOLFO of proximal mid RCA (4/20/2018), breast cancer s/p right lumpectomy, chemo and local radiation, compression fracture T12 - L1, fibromyalgia, GERD, IBS, Meniere's disease and paroxysmal atrial fibrillation who presents to the emergency department with a chief complaint of fall.  Patient is brought in by ambulance.  Per EMS report, patient reports falling at approximately 1930 last night sliding off her bed onto the floor.  The patient reports she was unable to find a phone to call for help and was too weak to get up, so she slept on the floor last night.  Of note, the patient was seen for a fall 10 days ago as well.  She is noted to have an abrasion to her arm.  The patient's daughter is concerned that the patient has been drinking heavily and not caring for herself recently.  The patient lives alone.  The patient admits that she has had struggles at home recently, but is uncertain if she is willing to be placed in a rehab facility at this time.    I have reviewed the Medications, Allergies, Past Medical and Surgical History, and Social History in the [employer] system.    Past Medical History:   Diagnosis Date     Breast cancer (H) 2007    right side - lumpectomy, chemo, and local radiation     Chronic infection     infection/wound for two years after esoph surgery     Compression fracture of spine (H)     T12-L1     Coronary artery disease 04/2018    s/p PCI with ADOLFO to proximal and mid RCA     Esophageal perforation      Fibromyalgia      GERD (gastroesophageal reflux disease)      Hiatal hernia      IBS (irritable bowel syndrome)      Meniere's disease     deaf left ear     Multiple sclerosis (H)      Noninfectious ileitis      Other chronic pain      Paroxysmal atrial fibrillation (H)      Past Surgical History:    Procedure Laterality Date     APPENDECTOMY       BACK SURGERY  5/1/2015    lumbar fusion     BRONCHOSCOPY FLEXIBLE N/A 4/17/2017    Procedure: BRONCHOSCOPY FLEXIBLE;;  Surgeon: Jens Wise MD;  [facility]     C GASTROSTOMY/JEJUN TUBE      J tube for feedings     CLOSE SPIT FISTULA N/A 4/17/2017    Procedure: CLOSE SPIT FISTULA;;  Surgeon: Jens Wise MD;  [facility]     COMBINED ESOPHAGOSCOPY, GASTROSCOPY, DUODENOSCOPY (EGD), REMOVE ESOPHAGEAL STENT N/A 8/26/2016    Procedure: COMBINED ESOPHAGOSCOPY, GASTROSCOPY, DUODENOSCOPY (EGD), REMOVE ESOPHAGEAL STENT;  Surgeon: Jens Wise MD;  [facility]     COMBINED ESOPHAGOSCOPY, GASTROSCOPY, DUODENOSCOPY (EGD), REMOVE ESOPHAGEAL STENT N/A 2/12/2018    Procedure: COMBINED ESOPHAGOSCOPY, GASTROSCOPY, DUODENOSCOPY (EGD), REMOVE ESOPHAGEAL STENT;  Esophagogastroduodenoscopy With Stent Removal;  Surgeon: Jens Wise MD;  [facility]     COMBINED ESOPHAGOSCOPY, GASTROSCOPY, DUODENOSCOPY (EGD), REPLACE ESOPHAGEAL STENT N/A 8/25/2017    Procedure: COMBINED ESOPHAGOSCOPY, GASTROSCOPY, DUODENOSCOPY (EGD), REPLACE ESOPHAGEAL STENT;;  Surgeon: Jens Wise MD;  [facility]     COMBINED ESOPHAGOSCOPY, GASTROSCOPY, DUODENOSCOPY (EGD), REPLACE ESOPHAGEAL STENT N/A 9/15/2017    Procedure: COMBINED ESOPHAGOSCOPY, GASTROSCOPY, DUODENOSCOPY (EGD), REPLACE ESOPHAGEAL STENT;  Upper Endoscopy with Stent Exchange, Cauterization of Tracheosophageal Fistula, Cauterization of Chest Wound   ;  Surgeon: Jens Wise MD;  [facility]     COMBINED ESOPHAGOSCOPY, GASTROSCOPY, DUODENOSCOPY (EGD), REPLACE ESOPHAGEAL STENT N/A 10/20/2017    Procedure: COMBINED ESOPHAGOSCOPY, GASTROSCOPY, DUODENOSCOPY (EGD), REPLACE ESOPHAGEAL STENT;  Upper Endoscopy with Stent Exchange, Cauterization Tracheosphageal Fistula Tract;  Surgeon: Nalini Barreto MD;  [facility]     COMBINED ESOPHAGOSCOPY,  GASTROSCOPY, DUODENOSCOPY (EGD), REPLACE ESOPHAGEAL STENT N/A 11/3/2017    Procedure: COMBINED ESOPHAGOSCOPY, GASTROSCOPY, DUODENOSCOPY (EGD), REPLACE ESOPHAGEAL STENT;  Esophagogastroduodenoscopy, Stent Revision, Cauterization Of Traceoesophageal Fistula and stent exchange;  Surgeon: Nalini Barreto MD;  [facility]     COMBINED ESOPHAGOSCOPY, GASTROSCOPY, DUODENOSCOPY (EGD), REPLACE ESOPHAGEAL STENT N/A 11/17/2017    Procedure: COMBINED ESOPHAGOSCOPY, GASTROSCOPY, DUODENOSCOPY (EGD), REPLACE ESOPHAGEAL STENT;  Esophagogastroduodenoscopy, Esophogeal Stent Removal, Esophogeal Stent Placement (23x100 EndoMAXX), Cauterization Of Fistula Tract;  Surgeon: Nalini Barreto MD;  [facility]     CREATE SPIT FISTULA N/A 1/9/2017    Procedure: CREATE SPIT FISTULA;  Surgeon: Jens Wise MD;  [facility]     ESOPHAGECTOMY N/A 1/9/2017    Procedure: ESOPHAGECTOMY;  Surgeon: Jens Wise MD;  [facility]     ESOPHAGOSCOPY, GASTROSCOPY, DUODENOSCOPY (EGD), COMBINED N/A 4/18/2016    Procedure: COMBINED ESOPHAGOSCOPY, GASTROSCOPY, DUODENOSCOPY (EGD);  Surgeon: Alexis Barraza MD;  [facility]     ESOPHAGOSCOPY, GASTROSCOPY, DUODENOSCOPY (EGD), COMBINED N/A 4/25/2016    Procedure: COMBINED ESOPHAGOSCOPY, GASTROSCOPY, DUODENOSCOPY (EGD);  Surgeon: Alexis Barraza MD;  [facility]     ESOPHAGOSCOPY, GASTROSCOPY, DUODENOSCOPY (EGD), COMBINED N/A 5/4/2016    Procedure: COMBINED ESOPHAGOSCOPY, GASTROSCOPY, DUODENOSCOPY (EGD);  Surgeon: Alexis Barraza MD;  [facility]     ESOPHAGOSCOPY, GASTROSCOPY, DUODENOSCOPY (EGD), COMBINED N/A 5/18/2016    Procedure: COMBINED ESOPHAGOSCOPY, GASTROSCOPY, DUODENOSCOPY (EGD);  Surgeon: Alexis Barraza MD;  [facility]     ESOPHAGOSCOPY, GASTROSCOPY, DUODENOSCOPY (EGD), COMBINED N/A 6/22/2016    Procedure: COMBINED ESOPHAGOSCOPY, GASTROSCOPY, DUODENOSCOPY (EGD);  Surgeon: Alexis Barraza MD;   [facility]     ESOPHAGOSCOPY, GASTROSCOPY, DUODENOSCOPY (EGD), COMBINED N/A 7/12/2016    Procedure: COMBINED ESOPHAGOSCOPY, GASTROSCOPY, DUODENOSCOPY (EGD);  Surgeon: Jens Wise MD;  [facility]     ESOPHAGOSCOPY, GASTROSCOPY, DUODENOSCOPY (EGD), COMBINED N/A 4/21/2017    Procedure: COMBINED ESOPHAGOSCOPY, GASTROSCOPY, DUODENOSCOPY (EGD);  Esophagogastroduodenoscopy, Pharyngostomy Tube Placement ;  Surgeon: Jens Wise MD;  [facility]     ESOPHAGOSCOPY, GASTROSCOPY, DUODENOSCOPY (EGD), COMBINED N/A 5/19/2017    Procedure: COMBINED ESOPHAGOSCOPY, GASTROSCOPY, DUODENOSCOPY (EGD);  Upper Endoscopy, Irrigation and Debridement of Chest Wound ;  Surgeon: Nalini Barreto MD;  [facility]     ESOPHAGOSCOPY, GASTROSCOPY, DUODENOSCOPY (EGD), COMBINED N/A 5/23/2017    Procedure: COMBINED ESOPHAGOSCOPY, GASTROSCOPY, DUODENOSCOPY (EGD);;  Surgeon: Nalini Barreto MD;  [facility]     ESOPHAGOSCOPY, GASTROSCOPY, DUODENOSCOPY (EGD), COMBINED N/A 6/27/2017    Procedure: COMBINED ESOPHAGOSCOPY, GASTROSCOPY, DUODENOSCOPY (EGD);  Esophagogastroduodenoscopy With Removal And Replacement Of Esophageal Stent exchange. Exchange of pharyngtomy tube. Chest wound dressing change;  Surgeon: Nalini Barreto MD;  [facility]     ESOPHAGOSCOPY, GASTROSCOPY, DUODENOSCOPY (EGD), COMBINED N/A 8/4/2017    Procedure: COMBINED ESOPHAGOSCOPY, GASTROSCOPY, DUODENOSCOPY (EGD);  Esophagogastroduodenoscopy, Stent Removal and Stent Replacement. Dressing Change ;  Surgeon: Nalini Barreto MD;  [facility]     ESOPHAGOSCOPY, GASTROSCOPY, DUODENOSCOPY (EGD), COMBINED N/A 8/25/2017    Procedure: COMBINED ESOPHAGOSCOPY, GASTROSCOPY, DUODENOSCOPY (EGD);  Esophagogastroduodenoscopy,  Stent Revision,  Cauterization;  Surgeon: Jens Wise MD;  [facility]     ESOPHAGOSCOPY, GASTROSCOPY, DUODENOSCOPY (EGD), COMBINED N/A 10/2/2017    Procedure: COMBINED ESOPHAGOSCOPY, GASTROSCOPY, DUODENOSCOPY  (EGD);  Esophagogastroduodenostomy, Replacement of Esophageal Stent, Cauterization of Tracheosophageal Fistula anc chest wall,   C-arm;  Surgeon: Nalini Barreto MD;  [facility]     ESOPHAGOSCOPY, GASTROSCOPY, DUODENOSCOPY (EGD), DILATATION, COMBINED N/A 6/29/2016    Procedure: COMBINED ESOPHAGOSCOPY, GASTROSCOPY, DUODENOSCOPY (EGD), DILATATION;  Surgeon: Jens Wise MD;  [facility]     EXPLORE NECK N/A 5/19/2017    Procedure: EXPLORE NECK;;  Surgeon: Nalini Barreto MD;  [facility]     HC UGI ENDOSCOPY W TRANSENDOSCOPIC STENT PLACEMENT N/A 7/12/2016    Procedure: COMBINED ESOPHAGOSCOPY, GASTROSCOPY, DUODENOSCOPY (EGD), PLACE TRANSENDOSCOPIC ESOPHAGEAL STENT;  Surgeon: Jens Wise MD;  [facility]     HC UGI ENDOSCOPY W TRANSENDOSCOPIC STENT PLACEMENT N/A 7/22/2016    Procedure: COMBINED ESOPHAGOSCOPY, GASTROSCOPY, DUODENOSCOPY (EGD), PLACE TRANSENDOSCOPIC ESOPHAGEAL STENT;  Surgeon: Jens Wise MD;  [facility]     INCISION AND DRAINAGE CHEST WASHOUT, COMBINED N/A 5/27/2017    Procedure: COMBINED INCISION AND DRAINAGE CHEST WASHOUT;  Chest washout;  Surgeon: Nalini Barreto MD;  [facility]     INCISION AND DRAINAGE CHEST WASHOUT, COMBINED N/A 5/28/2017    Procedure: COMBINED INCISION AND DRAINAGE CHEST WASHOUT;  Chest washout;  Surgeon: Nalini Barreto MD;  [facility]     INCISION AND DRAINAGE CHEST WASHOUT, COMBINED N/A 6/9/2017    Procedure: COMBINED INCISION AND DRAINAGE CHEST WASHOUT;  Chest washout and dressing change, Esophaogastroduodenoscopy;  Surgeon: Jens Wise MD;  [facility]     INCISION AND DRAINAGE CHEST WASHOUT, COMBINED N/A 7/17/2017    Procedure: COMBINED INCISION AND DRAINAGE CHEST WASHOUT;  Incision and Drainage of right Chest Wound, Esophagogastroduodenoscopy with stent exchange. pharangostomy tube revision;  Surgeon: Jens Wise MD;  [facility]     IRRIGATION AND DEBRIDEMENT CHEST  WASHOUT, COMBINED N/A 6/29/2016    Procedure: COMBINED IRRIGATION AND DEBRIDEMENT CHEST WASHOUT;  Surgeon: Jens Wise MD;  [facility]     IRRIGATION AND DEBRIDEMENT CHEST WASHOUT, COMBINED N/A 5/23/2017    Procedure: COMBINED IRRIGATION AND DEBRIDEMENT CHEST WASHOUT;  COMBINED IRRIGATION AND DEBRIDEMENT CHEST WASHOUT,COMBINED ESOPHAGOSCOPY, GASTROSCOPY, DUODENOSCOPY (EGD) ;  Surgeon: Nalini Barreto MD;  [facility]     IRRIGATION AND DEBRIDEMENT CHEST WASHOUT, COMBINED N/A 5/24/2017    Procedure: COMBINED IRRIGATION AND DEBRIDEMENT CHEST WASHOUT;  Chest wound Washout and Dressing Change;  Surgeon: Nalini Barreto MD;  [facility]     IRRIGATION AND DEBRIDEMENT CHEST WASHOUT, COMBINED N/A 5/25/2017    Procedure: COMBINED IRRIGATION AND DEBRIDEMENT CHEST WASHOUT;  Irrigation and Debridement Chest Washout and dressing change;  Surgeon: Nalini Barreto MD;  [facility]     IRRIGATION AND DEBRIDEMENT CHEST WASHOUT, COMBINED N/A 5/26/2017    Procedure: COMBINED IRRIGATION AND DEBRIDEMENT CHEST WASHOUT;  Irrigation and Debridement Chest Washout with  dressing change;  Surgeon: Nalini Barreto MD;  [facility]     IRRIGATION AND DEBRIDEMENT CHEST WASHOUT, COMBINED N/A 5/30/2017    Procedure: COMBINED IRRIGATION AND DEBRIDEMENT CHEST WASHOUT;  Irrigation and Debridement Chest Washout , PICC line dressing change;  Surgeon: Nalini Barreto MD;  [facility]     IRRIGATION AND DEBRIDEMENT CHEST WASHOUT, COMBINED N/A 5/31/2017    Procedure: COMBINED IRRIGATION AND DEBRIDEMENT CHEST WASHOUT;  Irrigation and Debridement Chest Washout ;  Surgeon: Nalini Barreto MD;  [facility]     IRRIGATION AND DEBRIDEMENT CHEST WASHOUT, COMBINED N/A 6/1/2017    Procedure: COMBINED IRRIGATION AND DEBRIDEMENT CHEST WASHOUT;  Chest Wound Irrigation And Debridement with dressing change ;  Surgeon: Nalini Barreto MD;  [facility]     IRRIGATION AND DEBRIDEMENT CHEST WASHOUT, COMBINED N/A 6/4/2017     Procedure: COMBINED IRRIGATION AND DEBRIDEMENT CHEST WASHOUT;  COMBINED IRRIGATION AND DEBRIDEMENT CHEST WASHOUT, Esophagoscopy, Gastroscopy, Duodenoscopy (EGD) place transendoscopic esophageal stent,   Pharyngostomy and chest wall tube exchange  C-Arm;  Surgeon: Jens Wise MD;  [facility]     IRRIGATION AND DEBRIDEMENT CHEST WASHOUT, COMBINED N/A 6/2/2017    Procedure: COMBINED IRRIGATION AND DEBRIDEMENT CHEST WASHOUT;  Chest Wound Irrigation and Debridement, Dressing Change;  Surgeon: Nalini Barreto MD;  [facility]     LAPAROSCOPIC ASSISTED INSERTION TUBE JEJUNOSTOMY N/A 4/17/2017    Procedure: LAPAROSCOPIC ASSISTED INSERTION TUBE JEJUNOSTOMY;;  Surgeon: Jens Wise MD;  [facility]     LAPAROSCOPY DIAGNOSTIC (GENERAL) N/A 1/9/2017    Procedure: LAPAROSCOPY DIAGNOSTIC (GENERAL);  Surgeon: Jens Wise MD;  [facility]     LAPAROSCOPY DIAGNOSTIC (GENERAL) N/A 4/17/2017    Procedure: LAPAROSCOPY DIAGNOSTIC (GENERAL);  Laparoscopic , Neck Dissection, Spit Fistula Takedown, Laparoscopic Jejunostomy Tube and Pharyngostomy Tube, Gastric Pull up, Upper Endoscopy(EGD)  , Flexible Bronchoscopy ,Sternotomy ;  Surgeon: Jens Wise MD;  [facility]     LUMPECTOMY BREAST Right 2007     NERVE BLOCK PERIPHERAL N/A 8/30/2016    Procedure: NERVE BLOCK PERIPHERAL;  Surgeon: GENERIC ANESTHESIA PROVIDER;  [facility]     NISSEN FUNDOPLICATION  1/2016     PHARYNGOSTOMY N/A 4/18/2016    Procedure: PHARYNGOSTOMY;  Surgeon: Alexis Barraza MD;  [facility]     PHARYNGOSTOMY N/A 4/25/2016    Procedure: PHARYNGOSTOMY;  Surgeon: Alexis Barraza MD;  [facility]     PHARYNGOSTOMY N/A 5/4/2016    Procedure: PHARYNGOSTOMY;  Surgeon: Alexis Barraza MD;  [facility]     PHARYNGOSTOMY N/A 6/22/2016    Procedure: PHARYNGOSTOMY;  Surgeon: Alexis Barraza MD;  [facility]     PHARYNGOSTOMY N/A 6/29/2016     "Procedure: PHARYNGOSTOMY;  Surgeon: Jens Wise MD;  [facility]     PHARYNGOSTOMY N/A 4/21/2017    Procedure: PHARYNGOSTOMY;;  Surgeon: Jens Wise MD;  [facility]     PHARYNGOSTOMY Left 5/31/2017    Procedure: PHARYNGOSTOMY;;  Surgeon: Nalini Barreto MD;  [facility]     PICC INSERTION Left 8/25/2016    5fr DL BioFlo PICC, 42cm (3cm external) in the L medial brachial vein w/ tip in the SVC RA junction.     PICC INSERTION - Rewire Right 05/31/2017    5fr DL BioFlo PICC, 40cm (6cm external) in the R medial brachial vein w/ tip in the SVC RA junction.     REPAIR FISTULA TRACHEOESOPHAGEAL N/A 9/15/2017    Procedure: REPAIR FISTULA TRACHEOESOPHAGEAL;;  Surgeon: Jens Wise MD;  [facility]     THORACOTOMY Right 1998    lung infection - \"hard crust formed on lung\"     THORACOTOMY Left 1/9/2017    Procedure: THORACOTOMY;  Surgeon: Jens Wise MD;  [facility]     WRIST SURGERY       No current facility-administered medications for this encounter.      Current Outpatient Medications   Medication     Acetaminophen (TYLENOL EXTRA STRENGTH PO)     aspirin 81 MG EC tablet     doxepin (SINEQUAN) 10 MG capsule     furosemide (LASIX) 40 MG tablet     gabapentin (NEURONTIN) 100 MG capsule     loperamide (IMODIUM A-D) 2 MG tablet     MELATONIN PO     metoprolol succinate ER (TOPROL-XL) 25 MG 24 hr tablet     omeprazole (PRILOSEC) 20 MG DR capsule     OTHER MEDICAL SUPPLIES     OTHER MEDICAL SUPPLIES     OTHER MEDICAL SUPPLIES     Pediatric Multivit-Minerals-C (CHEWABLES MULTIVITAMIN PO)     tiZANidine (ZANAFLEX) 4 MG tablet     vitamin D2 (ERGOCALCIFEROL) 17156 units (1250 mcg) capsule     Allergies   Allergen Reactions     Amoxicillin Diarrhea     Ativan [Lorazepam] Other (See Comments)     Hallucinations     Morphine Itching     Past medical history, past surgical history, medications, and allergies were reviewed with the patient. Additional pertinent " items: None    Social History     Socioeconomic History     Marital status:      Spouse name: richard     Number of children: 2     Years of education: Not on file     Highest education level: Not on file   Occupational History     Occupation: retired    Social Needs     Financial resource strain: Not on file     Food insecurity     Worry: Not on file     Inability: Not on file     Transportation needs     Medical: Not on file     Non-medical: Not on file   Tobacco Use     Smoking status: Former Smoker     Packs/day: 1.00     Years: 15.00     Pack years: 15.00     Types: Cigarettes     Last attempt to quit: 1998     Years since quittin.6     Smokeless tobacco: Never Used   Substance and Sexual Activity     Alcohol use: No     Alcohol/week: 0.0 standard drinks     Drug use: No     Sexual activity: Not on file   Lifestyle     Physical activity     Days per week: Not on file     Minutes per session: Not on file     Stress: Not on file   Relationships     Social connections     Talks on phone: Not on file     Gets together: Not on file     Attends Jew service: Not on file     Active member of club or organization: Not on file     Attends meetings of clubs or organizations: Not on file     Relationship status: Not on file     Intimate partner violence     Fear of current or ex partner: Not on file     Emotionally abused: Not on file     Physically abused: Not on file     Forced sexual activity: Not on file   Other Topics Concern     Not on file   Social History Narrative    Retired realtor.  Lives with  Richard. 2 children alive and well.     Social history was reviewed with the patient. Additional pertinent items: None    Review of Systems  General: No fevers or chills  Skin: No rash or diaphoresis, positive for abrasion  Eyes: No eye redness or discharge  Ears/Nose/Throat: No rhinorrhea or nasal congestion  Respiratory: No cough or SOB  Cardiovascular: No chest pain or  palpitations  Gastrointestinal: No nausea, vomiting, or diarrhea  Genitourinary: No urinary frequency, hematuria, or dysuria  Musculoskeletal: No arthralgias or myalgias  Neurologic: No numbness, positive for generalized weakness  Psychiatric: No depression or SI  Hematologic/Lymphatic/Immunologic: No leg swelling, no easy bruising/bleeding  Endocrine: No polyuria/polydypsia    A complete review of systems was performed with pertinent positives and negatives noted in the HPI, and all other systems negative.    Physical Exam   BP: 104/65  Heart Rate: 119  Temp: 98.2  F (36.8  C)  Resp: 16  Height: 152.4 cm (5')  Weight: 41.5 kg (91 lb 8 oz)  SpO2: 99 %      General: Well nourished, well developed, NAD  HEENT: EOMI, anicteric. NCAT, MMM  Neck: no jugular venous distension, supple, nl ROM, no midline tenderness or step-off  Cardiac: Regular rate and rhythm. No murmurs, rubs, or gallops. Normal S1, S2.  Intact peripheral pulses  Pulm: CTAB, no stridor, wheezes, rales, rhonchi  Abd: Soft, nontender, nondistended.  No masses palpated.    Skin: Warm and dry to the touch.  No rash.  Small abrasion over left elbow, no active bleeding, no surrounding skin changes  Extremities: No LE edema, no cyanosis, w/w/p, abrasion to left elbow as noted above, normal range of motion, mildly tender directly overlying abrasion, otherwise no tenderness or swelling to affected extremity, distally neurovascularly intact  Neuro: Alert and oriented x 4, no facial droop, CN II-XII intact, moving all 4 extremities, sensation intact throughout, no nystagmus, coordination normal as tested. PERRL, EOMI.  Speech is clear without aphasia or dysarthria.      ED Course        Procedures             EKG Interpretation:      Interpreted by Cynthia Escalera MD  Time reviewed: 1200  Symptoms at time of EKG: weakness   Rhythm: normal sinus   Rate: normal  Axis: normal  Ectopy: none  Conduction: normal  ST Segments/ T Waves: lateral T wave flattening, No  ST-T wave changes  Q Waves: none  Comparison to prior: Unchanged from 8/1/2020 except for development of tachycardia    Clinical Impression: abnormal EKG                         Labs Ordered and Resulted from Time of ED Arrival Up to the Time of Departure from the ED   CBC WITH PLATELETS DIFFERENTIAL - Abnormal; Notable for the following components:       Result Value    WBC 16.3 (*)     RBC Count 3.08 (*)      (*)     MCH 40.3 (*)     RDW 16.1 (*)     Absolute Neutrophil 15.0 (*)     Absolute Lymphocytes 0.4 (*)     All other components within normal limits   COMPREHENSIVE METABOLIC PANEL - Abnormal; Notable for the following components:    Potassium 2.5 (*)     Glucose 51 (*)     Urea Nitrogen 6 (*)     Calcium 8.1 (*)     Bilirubin Total 2.6 (*)     Albumin 2.6 (*)     Alkaline Phosphatase 472 (*)     ALT 59 (*)      (*)     All other components within normal limits   CK TOTAL - Abnormal; Notable for the following components:    CK Total 248 (*)     All other components within normal limits   MAGNESIUM - Abnormal; Notable for the following components:    Magnesium 1.4 (*)     All other components within normal limits   NT PROBNP INPATIENT - Abnormal; Notable for the following components:    N-Terminal Pro BNP Inpatient 1,855 (*)     All other components within normal limits   ISTAT GASES ELEC ICA GLUC BETZAIDA POCT - Abnormal; Notable for the following components:    Ph Venous 7.55 (*)     PCO2 Venous 33 (*)     Bicarbonate Venous 29 (*)     Glucose 55 (*)     Calcium Ionized 3.2 (*)     All other components within normal limits   ISTAT GASES ELEC ICA GLUC BETZAIDA POCT - Abnormal; Notable for the following components:    Ph Venous 7.47 (*)     PO2 Venous 22 (*)     Bicarbonate Venous 32 (*)     Potassium 2.5 (*)     Glucose 55 (*)     Calcium Ionized 3.8 (*)     All other components within normal limits   TROPONIN I   TSH WITH FREE T4 REFLEX   UA MACROSCOPIC WITH REFLEX TO MICRO AND CULTURE   ALCOHOL ETHYL    PHOSPHORUS   ISTAT CG8 GAS ELEC ICA GLUC LUDA NURSE POCT   CARDIAC CONTINUOUS MONITORING            Results for orders placed or performed during the hospital encounter of 08/17/20 (from the past 24 hour(s))   ISTAT gases elec ica gluc luda POCT   Result Value Ref Range    Ph Venous 7.55 (H) 7.32 - 7.43 pH    PCO2 Venous 33 (L) 40 - 50 mm Hg    PO2 Venous 25 25 - 47 mm Hg    Bicarbonate Venous 29 (H) 21 - 28 mmol/L    O2 Sat Venous 54 %    Sodium 138 133 - 144 mmol/L    Potassium 4.2 3.4 - 5.3 mmol/L    Glucose 55 (L) 70 - 99 mg/dL    Calcium Ionized 3.2 (L) 4.4 - 5.2 mg/dL    Hemoglobin 15.6 11.7 - 15.7 g/dL    Hematocrit - POCT 46 35.0 - 47.0 %PCV   CBC with platelets differential   Result Value Ref Range    WBC 16.3 (H) 4.0 - 11.0 10e9/L    RBC Count 3.08 (L) 3.8 - 5.2 10e12/L    Hemoglobin 12.4 11.7 - 15.7 g/dL    Hematocrit 36.4 35.0 - 47.0 %     (H) 78 - 100 fl    MCH 40.3 (H) 26.5 - 33.0 pg    MCHC 34.1 31.5 - 36.5 g/dL    RDW 16.1 (H) 10.0 - 15.0 %    Platelet Count 367 150 - 450 10e9/L    Diff Method Automated Method     % Neutrophils 92.1 %    % Lymphocytes 2.7 %    % Monocytes 4.3 %    % Eosinophils 0.0 %    % Basophils 0.4 %    % Immature Granulocytes 0.5 %    Nucleated RBCs 0 0 /100    Absolute Neutrophil 15.0 (H) 1.6 - 8.3 10e9/L    Absolute Lymphocytes 0.4 (L) 0.8 - 5.3 10e9/L    Absolute Monocytes 0.7 0.0 - 1.3 10e9/L    Absolute Eosinophils 0.0 0.0 - 0.7 10e9/L    Absolute Basophils 0.1 0.0 - 0.2 10e9/L    Abs Immature Granulocytes 0.1 0 - 0.4 10e9/L    Absolute Nucleated RBC 0.0    Comprehensive metabolic panel   Result Value Ref Range    Sodium 141 133 - 144 mmol/L    Potassium 2.5 (LL) 3.4 - 5.3 mmol/L    Chloride 98 94 - 109 mmol/L    Carbon Dioxide 30 20 - 32 mmol/L    Anion Gap 13 3 - 14 mmol/L    Glucose 51 (L) 70 - 99 mg/dL    Urea Nitrogen 6 (L) 7 - 30 mg/dL    Creatinine 0.56 0.52 - 1.04 mg/dL    GFR Estimate >90 >60 mL/min/[1.73_m2]    GFR Estimate If Black >90 >60 mL/min/[1.73_m2]     Calcium 8.1 (L) 8.5 - 10.1 mg/dL    Bilirubin Total 2.6 (H) 0.2 - 1.3 mg/dL    Albumin 2.6 (L) 3.4 - 5.0 g/dL    Protein Total 7.3 6.8 - 8.8 g/dL    Alkaline Phosphatase 472 (H) 40 - 150 U/L    ALT 59 (H) 0 - 50 U/L     (H) 0 - 45 U/L   Troponin I   Result Value Ref Range    Troponin I ES <0.015 0.000 - 0.045 ug/L   CK total   Result Value Ref Range    CK Total 248 (H) 30 - 225 U/L   TSH with free T4 reflex   Result Value Ref Range    TSH 1.81 0.40 - 4.00 mU/L   Alcohol ethyl   Result Value Ref Range    Ethanol g/dL <0.01 <0.01 g/dL   Magnesium   Result Value Ref Range    Magnesium 1.4 (L) 1.6 - 2.3 mg/dL   Phosphorus   Result Value Ref Range    Phosphorus 3.3 2.5 - 4.5 mg/dL   Nt probnp inpatient (BNP)   Result Value Ref Range    N-Terminal Pro BNP Inpatient 1,855 (H) 0 - 900 pg/mL   ISTAT gases elec ica gluc luda POCT   Result Value Ref Range    Ph Venous 7.47 (H) 7.32 - 7.43 pH    PCO2 Venous 44 40 - 50 mm Hg    PO2 Venous 22 (L) 25 - 47 mm Hg    Bicarbonate Venous 32 (H) 21 - 28 mmol/L    O2 Sat Venous 41 %    Sodium 142 133 - 144 mmol/L    Potassium 2.5 (LL) 3.4 - 5.3 mmol/L    Glucose 55 (L) 70 - 99 mg/dL    Calcium Ionized 3.8 (L) 4.4 - 5.2 mg/dL    Hemoglobin 14.3 11.7 - 15.7 g/dL    Hematocrit - POCT 42 35.0 - 47.0 %PCV   CT Head w/o Contrast    Narrative    CT HEAD W/O CONTRAST 8/17/2020 12:27 PM    History: weakness, fall     Comparison: 8/1/2020 dated head CT    Technique: Using multidetector thin collimation helical acquisition  technique, axial, coronal and sagittal CT images from the skull base  to the vertex were obtained without intravenous contrast.    Findings: There is no intracranial hemorrhage, mass effect, or midline  shift. Gray/white matter differentiation in both cerebral hemispheres  is preserved. Ventricles are proportionate to the cerebral sulci. The  basal cisterns are clear. Mild diffuse cerebral volume loss. Scattered  periventricular hypodensities, consistent with chronic  small vessel  ischemic disease given the patient's age.    The bony calvaria and the bones of the skull base are normal. The  visualized portions of the paranasal sinuses and mastoid air cells are  clear.       Impression    Impression:  No acute intracranial pathology.     BRITTANI VILLARREAL MD   XR Chest 2 Views    Narrative    Exam: XR CHEST 2 VW, 8/17/2020 12:28 PM    Indication: weak, fall    Comparison: 8/1/2020    Findings:   Extensive spinal fusion hardware in the thoracolumbar spine. Trachea  is midline. The cardiomediastinal silhouette is within normal limits.  Small left pleural effusion. No substantial right pleural effusion. No  pneumothorax. Partial left rib resection. Surgical clips in the right  axilla. Degenerative changes of the right shoulder. No focal  consolidative opacity.      Impression    Impression:   1. Small left pleural effusion. The lungs are otherwise relatively  clear.       Labs, vital signs, and imaging studies were reviewed by me.    Medications   potassium chloride ER (KLOR-CON M) CR tablet 60 mEq (has no administration in time range)   potassium chloride 10 mEq in 100 mL intermittent infusion with 10 mg lidocaine (10 mEq Intravenous New Bag 8/17/20 1307)   acetaminophen (TYLENOL) tablet 1,000 mg (1,000 mg Oral Given 8/17/20 1257)   omeprazole (priLOSEC) CR capsule 20 mg (20 mg Oral Given 8/17/20 1257)   magnesium sulfate 2 g in water intermittent infusion (2 g Intravenous New Bag 8/17/20 1307)       Assessments & Plan (with Medical Decision Making)   Minerva Blanco is a 75 year old female who presents after fall.  Patient denies pain labs, urinalysis, EKG, head CT ordered to further evaluate the patient.    EKG shows mild tachycardia, no significant acute changes otherwise    Labs are remarkable for hypokalemia and hypomagnesemia. Potassium and magnesium replacement was ordered.  Patient also has mild leukocytosis.    CXR shows small left pleural effusion    CT head shows  NAD    Patient attempted to ambulate in the emergency department, remained unsteady on her feet and required assistance    I have reviewed the nursing notes.    I have reviewed the findings, diagnosis, plan and need for follow up with the patient.    Patient discussed with IM, to be admitted to their service for further management. Plan was discussed with patient who understands and agrees with plan.     New Prescriptions    No medications on file       Final diagnoses:   Generalized weakness   Fall, initial encounter   Abrasion   Hypokalemia   Hypomagnesemia   Leukocytosis, unspecified type       8/17/2020   [facility], [place], EMERGENCY DEPARTMENT     Cynthia Escalera MD  08/18/20 [id number]

## 2020-08-17 NOTE — ED NOTES
Per EMS report pt fell at approx 1930 last night. Abrasion to arm. Daughter is concerned pt has been drinking and not caring for self.

## 2020-08-17 NOTE — LETTER
Fax   Date:         8/21/2020    To:      Name: [facility]  Fax: [phone]  Message:  Patient discharging to home today. Resumption and discharge orders attached.       From:   Cynthia Rod RN, BSN  Nurse Care Coordinator   Office: [phone]: [phone]  Fax: [phone]

## 2020-08-17 NOTE — ED NOTES
[facility], [place]   ED Nurse to Floor Handoff     Minerva Blanco is a 75 year old female who speaks English and lives alone,  in a home  They arrived in the ED by ambulance from home    ED Chief Complaint: Generalized Weakness    ED Dx;   Final diagnoses:   Generalized weakness   Fall, initial encounter   Abrasion   Hypokalemia   Hypomagnesemia   Leukocytosis, unspecified type   Elevated brain natriuretic peptide (BNP) level   Elevated CK         Needed?: No    Allergies:   Allergies   Allergen Reactions     Amoxicillin Diarrhea     Ativan [Lorazepam] Other (See Comments)     Hallucinations     Morphine Itching   .  Past Medical Hx:   Past Medical History:   Diagnosis Date     Breast cancer (H) 2007    right side - lumpectomy, chemo, and local radiation     Chronic infection     infection/wound for two years after esoph surgery     Compression fracture of spine (H)     T12-L1     Coronary artery disease 04/2018    s/p PCI with ADOLFO to proximal and mid RCA     Esophageal perforation      Fibromyalgia      GERD (gastroesophageal reflux disease)      Hiatal hernia      IBS (irritable bowel syndrome)      Meniere's disease     deaf left ear     Multiple sclerosis (H)      Noninfectious ileitis      Other chronic pain      Paroxysmal atrial fibrillation (H)       Baseline Mental status: WDL  Current Mental Status changes: at basesline    Infection present or suspected this encounter: no  Sepsis suspected: No  Isolation type: No active isolations     Activity level - Baseline/Home:  Independent and Walker  Activity Level - Current:   Stand with Assist and Walker    Bariatric equipment needed?: No    In the ED these meds were given:   Medications   acetaminophen (TYLENOL) tablet 1,000 mg (1,000 mg Oral Given 8/17/20 1257)   omeprazole (priLOSEC) CR capsule 20 mg (20 mg Oral Given 8/17/20 1257)   potassium chloride ER (KLOR-CON M) CR tablet 60 mEq (60 mEq Oral Given 8/17/20 1335)    potassium chloride 10 mEq in 100 mL intermittent infusion with 10 mg lidocaine (0 mEq Intravenous Stopped 8/17/20 1451)   magnesium sulfate 2 g in water intermittent infusion (2 g Intravenous New Bag 8/17/20 1307)       Drips running?  Yes    Home pump  No    Current LDAs  Peripheral IV 08/17/20 Left (Active)   Number of days: 0       Gastrostomy/Enterostomy Jejunostomy LUQ 1 16 fr  (Active)   Number of days: 927       Labs results:   Labs Ordered and Resulted from Time of ED Arrival Up to the Time of Departure from the ED   CBC WITH PLATELETS DIFFERENTIAL - Abnormal; Notable for the following components:       Result Value    WBC 16.3 (*)     RBC Count 3.08 (*)      (*)     MCH 40.3 (*)     RDW 16.1 (*)     Absolute Neutrophil 15.0 (*)     Absolute Lymphocytes 0.4 (*)     All other components within normal limits   COMPREHENSIVE METABOLIC PANEL - Abnormal; Notable for the following components:    Potassium 2.5 (*)     Glucose 51 (*)     Urea Nitrogen 6 (*)     Calcium 8.1 (*)     Bilirubin Total 2.6 (*)     Albumin 2.6 (*)     Alkaline Phosphatase 472 (*)     ALT 59 (*)      (*)     All other components within normal limits   CK TOTAL - Abnormal; Notable for the following components:    CK Total 248 (*)     All other components within normal limits   UA MACROSCOPIC WITH REFLEX TO MICRO AND CULTURE - Abnormal; Notable for the following components:    Bilirubin Urine Small (*)     Ketones Urine 10 (*)     Protein Albumin Urine 10 (*)     Urobilinogen mg/dL 4.0 (*)     Leukocyte Esterase Urine Trace (*)     Hyaline Casts 56 (*)     All other components within normal limits   MAGNESIUM - Abnormal; Notable for the following components:    Magnesium 1.4 (*)     All other components within normal limits   NT PROBNP INPATIENT - Abnormal; Notable for the following components:    N-Terminal Pro BNP Inpatient 1,855 (*)     All other components within normal limits   ISTAT GASES ELEC ICA GLUC BETZAIDA POCT -  Abnormal; Notable for the following components:    Ph Venous 7.47 (*)     PO2 Venous 22 (*)     Bicarbonate Venous 32 (*)     Potassium 2.5 (*)     Glucose 55 (*)     Calcium Ionized 3.8 (*)     All other components within normal limits   TROPONIN I   TSH WITH FREE T4 REFLEX   ALCOHOL ETHYL   PHOSPHORUS   ISTAT CG8 GAS ELEC ICA GLUC BETZAIDA NURSE POCT   CARDIAC CONTINUOUS MONITORING   ISTAT GASES ELEC ICA GLUC BETZAIDA POCT       Imaging Studies:   Recent Results (from the past 24 hour(s))   CT Head w/o Contrast    Narrative    CT HEAD W/O CONTRAST 8/17/2020 12:27 PM    History: weakness, fall     Comparison: 8/1/2020 dated head CT    Technique: Using multidetector thin collimation helical acquisition  technique, axial, coronal and sagittal CT images from the skull base  to the vertex were obtained without intravenous contrast.    Findings: There is no intracranial hemorrhage, mass effect, or midline  shift. Gray/white matter differentiation in both cerebral hemispheres  is preserved. Ventricles are proportionate to the cerebral sulci. The  basal cisterns are clear. Mild diffuse cerebral volume loss. Scattered  periventricular hypodensities, consistent with chronic small vessel  ischemic disease given the patient's age.    The bony calvaria and the bones of the skull base are normal. The  visualized portions of the paranasal sinuses and mastoid air cells are  clear.       Impression    Impression:  No acute intracranial pathology.     BRITTANI VILLARREAL MD   XR Chest 2 Views    Narrative    Exam: XR CHEST 2 VW, 8/17/2020 12:28 PM    Indication: weak, fall    Comparison: 8/1/2020    Findings:   Extensive spinal fusion hardware in the thoracolumbar spine. Trachea  is midline. Heart size is normal. Atherosclerotic calcifications at  the aortic arch. Small left pleural effusion. No substantial right  pleural effusion. No pneumothorax. Partial left rib resection.  Surgical clips in the right axilla related to prior right  lumpectomy.  Degenerative changes of the right shoulder. No focal consolidative  opacity.      Impression    Impression:   1. Small left pleural effusion. The lungs are otherwise relatively  clear. Right lumpectomy.    I have personally reviewed the examination and initial interpretation  and I agree with the findings.    LUCINDA VARGAS MD   Abdomen US, limited (RUQ only)    Narrative    EXAMINATION: Limited Abdominal Ultrasound, 8/17/2020 1:52 PM     COMPARISON: Ultrasound 8/3/2020.    HISTORY: Elevated liver labs.    FINDINGS:   Fluid: No evidence of ascites or pleural effusions.    Liver: The liver demonstrates increased echogenicity and coarsened  echotexture, measuring 16 cm cm in craniocaudal dimension. There is no  focal mass.     Gallbladder: There is no wall thickening, pericholecystic fluid,  positive sonographic White's sign or evidence for cholelithiasis.  Small amount of layering echogenicity likely representing sludge.    Bile Ducts: Redemonstration of dilated tubular structure anterior to  the IVC consistent with common bile duct based on CT comparison from  11/15/2017. Currently measuring 14 mm. Measured 13 mm on 8/3/2020  ultrasound. Measured up to 16 mm on 11/15/2017 CT.     Pancreas: Obscured by overlying bowel gas.    Kidney: The right kidney measures 10.2 cm long. There is no  hydronephrosis or hydroureter, no shadowing renal calculi, cystic  lesion or mass.       Impression    IMPRESSION:   1.  Dilated common bile duct measuring 13 mm. No significant change in  diameter since 8/3/2020, somewhat improved since CT abdomen pelvis  dated 11/15/2017, when it measured up to 16 mm. If there is  significant and persistent clinical concern for choledocholithiasis or  other obstructing pathology at the level of distal bile duct, MRCP  could be obtained.  2.  No direct sonographic evidence of cholelithiasis or  choledocholithiasis. Small amount of biliary sludge.  3.  Hepatic steatosis.    I have  personally reviewed the examination and initial interpretation  and I agree with the findings.    ALTAGRACIA BARBER MD       Recent vital signs:   /70   Pulse 111   Temp 98.2  F (36.8  C) (Oral)   Resp 16   Ht 1.524 m (5')   Wt 41.5 kg (91 lb 8 oz)   SpO2 98%   BMI 17.87 kg/m      [place] Coma Scale Score: 15 (08/17/20 1045)       Cardiac Rhythm: Other  Pt needs tele? No  Skin/wound Issues: bruising, skin tear left elbow    Code Status: Full Code    Pain control: fair    Nausea control: pt had none    Abnormal labs/tests/findings requiring intervention: electrolytes replaced    Family present during ED course? Yes   Family Comments/Social Situation comments: daughter concerned about patient's living condition    Tasks needing completion: None    Khushi Padilla, RN  [phone] [facility]

## 2020-08-17 NOTE — H&P
Resident/Fellow Attestation   I, Zaira Caruso, was present with the medical student who participated in the service and in the documentation of the note.  I have verified the history and personally performed the physical exam and medical decision making.  I agree with the assessment and plan of care as documented in the note.         Zaira Caruso MD  Medicine-Pediatrics PGY4  Pager # [phone]      [facility], [place]    History and Physical - [facility] 3 Service        Date of Admission:  8/17/2020    Assessment & Plan   Minerva Blanco is a 75 year old woman with past medical history significant for CAD s/p PCI with ADOLFO x2 4/2018, remote breast cancer, esophageal stricture, and recent hospitalization for weakness and fall with negative workup, admitted on 8/17 again for weakness resulting in an atraumatic fall. Similar to the previous hospitalization, on admission, she was noted to be hypokalemic, hypoglycemic, malnourished, and had biliary obstructive pathology.     Weakness with hx of falls    Severe hypokalemia  Malnutrition in the setting of acute illness  Likely multifactorial: significant hypokalemia (see below) considering poor nutrition, polypharmacy, electrolyte abnormalities, and hypoglycemia.   Poor nutrition: Loss of appetite and accompanying fatigue ongoing for 5-6 weeks. Also has history of esophogeal stricture 2/2 Nissen in 2016 for hiatal hernia, notes that food often gets stuck in her throat and so doesn't want to eat.   Polypharmacy: She is also taking benadryl doxepin, tizandidine, and furosemide; polypharmacy likely contributing.   Electrolyte abnormalities:  hypokalemic to 2.5 which can cause weakness   Hypoglycemia: found to be hypoglycemic to 55 on presentation   Unlikely cardiac etiology as TTE from 8/2 showed normal function. Unlikely infectious cause as afebrile and no localizing signs/symptoms, and leukocytosis felt to be stress  response.   - D5  mL bolus  - electrolyte protocol, as below   - Nutrition consult  - PT consult  - Stop benadryl    Hypokalemia  On admission, was hypokalemic 2.5 with no EKG changes, similar to prior admission 8/5. Prior admission for similar issue, thought to be combination of poor dietary intake and loop diuretics.  With pH 7.47 and bicarb 32, could be 2/2 contraction alkalosis from diarrhea or insufficient PO intake +/- diuretic use (on 40 mg lasix at home). Likely contributing to weakness (see above).   - will consider further work-up for hypokalemia in the AM, including evaluating adrenal causes  - s/p KCl 10 mEq IV and 60 meEq PO  - K replacement protocol   - Hold lasix     Hypoglycemia   On admission, BG noted to be 51, back up to 117 after drinking apple juice. Likely 2/2 insufficient caloric intake.   - Hypoglycemia protocol   - Consider insulinoma if continues and more common etiologies ruled out (see above)    Suspected hepatobiliary obstruction  Has history of increasing biliary labs including bilirubin and alk phos since last hospitalization as well as bile duct dilation on US to 14 mm, with recent dilation, 13 mm on 8/3/2020 US and 16 mm on 11/15/2017 CT; no abdominal pain, concerning for chronic obstruction, possibly 2/2 malignancy given h/o steatosis and breast cancer. MRCP June 2019 negative for obstruction. GI saw pt on last admission and recommended outpt follow-up with MRCP.   - GI consult in AM to discuss imaging for possible malignancy     Leukocytosis   WBC 16.3 and lactic acid 2.3, but afebrile, CXR unconcerning for pneumonia, UA unconcerning for UTI and lack of photophobia/neck pain with movement makes meninginitis unlikely. Likely 2/2 inflammation from night on floor and contraction from lack of fluid intake.  - CBC in AM    Esophageal stricture  Dysphagia  2/2 Nissen fundoplication in 1/2016 for hiatal hernia that resulted in 3 years needing a feeding tube. Likely contributing to  this and prior hospitalization.   - PTA omeprazole 20 mg  - GI consult for dilation during hospitalization vs outpatient    CAD s/p ADOLFO 2018   Appears euvolemic on exam, troponin and EKG normal. Echo from early August shows normal function, CHF unlikely. BNP elevated at 1855 but down from 3244 on last admission.  - ASA 81 mg PTA  - PTA metoprolol 12.5 PO    Hypocalcemia   3.8 on admission  - Calcium citrate 950 mg PO    Hypomagnesia   1.4 on admission, likely 2/2 poor oral intake  -Magnesium replacement protocol     Headache  Reporting new mild R frontal headache not responding to Ibuprofen. Reported did not hit her head. 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 Likely 2/2 dehydration.  - IVF as above  - One time hydromorphone 2 mg  - continue to monitor     Elevated CK  CK slightly elevated 248, likely due to spending night on the floor   -IV fluids as above      Diet: PO  Fluids: PO, s/p D5  mL bolus  DVT Prophylaxis: Low Risk/Ambulatory with no VTE prophylaxis indicated  Whitt Catheter: not present  Code Status: DNR/DNI         Disposition Plan   Expected discharge: 2 - 3 days, recommended to prior living arrangement once electrolyte abnormalities corrected and can safely ambulate independently.   Entered: Sapphire Rodriguez 08/17/2020, 5:51 PM     The patient's care was discussed with the Attending Physician, Dr. Naldo Carroll.    Sapphire Rodriguez  Medical Student  [facility] 3 Service  [facility], [place]  Pager: [phone]  Please see sticky note for cross cover information  ______________________________________________________________________    Chief Complaint   Fall    History is obtained from the patient    History of Present Illness   Minerva Blanco is a 75 year old woman with past medical history significant for CAD s/p PCI with ADOLFO x2 4/2018, remote breast cancer, esophageal stricture, and recent hospitalization for  weakness and fall with negative workup admitted on 8/17 again for weakness resulting in an atraumatic fall. Six weeks ago she began losing her appetite and feeling weaker. She has a history of esophageal stricture and has also had progressive constant burping and frequently coughs up foul-smelling, undigested food, which she believes is contributing to her weakness. She has also experienced non-dark diarrhea during this time which is controlled with loperamide (she has a history of IBS). The diarrhea is occasionally streaked with bright red blood which she attributes to hemorrhoids. On 8/1 she felt extremely weak while walking from her kitchen to her living room and fell for which she was hospitalized. The hospitalization workup was unrevealing, she demonstrated good oral intake, and was discharged home on 8/5/20. After returning home she again lost her appetite and felt progressively weak. Last night after taking her nightly benadryl she tried to reach for her phone from bed and slowly slipped out of bed. She did not hit her head or suffer any injuries on the way down. She was unable to get up and spent the night on the floor. In the morning she was able to climb up and reach the phone so she called her friend who brought her to the hospital.    Review of Systems    The 10 point Review of Systems is negative other than noted in the HPI or here. Notably, negative for fever, chest pain, palpitations, orthopnea, abdominal pain or burning, nausea, or dysuria.    Past Medical History    Past medical history reviewed with no changes.     Past Surgical History   Past surgical history reviewed with no changes.     Social History   I have personally reviewed the social history with the patient showing lives alone in independent living facility and has 1 glass of wine nightly with dinner.    Family History   I have reviewed this patient's family history and updated it with pertinent information if needed.   Family History    Problem Relation Age of Onset     Alzheimer Disease Mother      Cerebrovascular Disease Father         cerebral hemorrhage     Ovarian Cancer Sister      Breast Cancer Daughter        Prior to Admission Medications   Prior to Admission Medications   Prescriptions Last Dose Informant Patient Reported? Taking?   Acetaminophen (TYLENOL EXTRA STRENGTH PO) Past Week at Unknown time  Yes Yes   Sig: Take 1,000 mg by mouth 3 times daily And 500 mg by mouth 2 times daily as needed   MELATONIN PO 2020 at Unknown time  Yes Yes   Sig: Take 10 mg by mouth At Bedtime    OTHER MEDICAL SUPPLIES   Yes No   Sig: every morning Daily weights.   OTHER MEDICAL SUPPLIES   Yes No   Si times daily BP checks qid.   OTHER MEDICAL SUPPLIES   Yes No   Sig: Legs: Black socks and Compriflex LEs. Thigh high wraps. Leave on at all times. If soiled may remove thigh portions.    every shift   Pediatric Multivit-Minerals-C (CHEWABLES MULTIVITAMIN PO) 2020 at Unknown time  Yes Yes   Sig: Take 1 tablet by mouth daily    aspirin 81 MG EC tablet 2020 at Unknown time  Yes Yes   Sig: Take 81 mg by mouth daily   doxepin (SINEQUAN) 10 MG capsule 2020 at Unknown time  Yes Yes   Sig: Take 20 mg by mouth At Bedtime    furosemide (LASIX) 40 MG tablet 2020 at Unknown time  No Yes   Sig: Take 0.5 tablets (20 mg) by mouth daily as needed   gabapentin (NEURONTIN) 100 MG capsule 2020 at Unknown time  Yes Yes   Sig: Take 400 mg by mouth At Bedtime   loperamide (IMODIUM A-D) 2 MG tablet Past Week at Unknown time  Yes Yes   Sig: Take 2 mg by mouth daily as needed Give 4mg with first loose stool AND Give 2 mg by mouth as needed for Loose Stools 2 mg with each subsequent loose stool episode after initial 4 mg first dose. NOT TO EXCEED 16 MG IN 24   metoprolol succinate ER (TOPROL-XL) 25 MG 24 hr tablet 2020 at Unknown time  Yes Yes   Sig: Take 12.5 mg by mouth every morning   omeprazole (PRILOSEC) 20 MG DR capsule 2020 at  Unknown time  Yes Yes   Sig: Take 1 capsule by mouth 3 times daily before meals and at bedtime as needed.   potassium chloride (KLOR-CON) 20 MEQ packet Unknown at Unknown time  Yes No   Sig: Take 20 mEq by mouth every morning   tiZANidine (ZANAFLEX) 4 MG tablet 8/16/2020 at Unknown time  Yes Yes   Sig: Take 4 mg by mouth At Bedtime   vitamin D2 (ERGOCALCIFEROL) 84443 units (1250 mcg) capsule Past Week at Unknown time  Yes Yes   Sig: Take 50,000 Units by mouth once a week      Facility-Administered Medications: None     Allergies   Allergies   Allergen Reactions     Amoxicillin Diarrhea     Ativan [Lorazepam] Other (See Comments)     Hallucinations     Morphine Itching       Physical Exam   Vital Signs: Temp: 98.2  F (36.8  C) Temp src: Oral BP: (!) 124/102 Pulse: 96 Heart Rate: 96 Resp: 16 SpO2: 97 % O2 Device: None (Room air)    Weight: 91 lbs 8 oz  General: Pale, frail-appearing, conversant  Neruo: R pupil larger than L, extraocular eye movements intact, CN III - XII grossly intact  HEENT: Atraumatic, normocephalic, no photophobia  CV: Normal rate and rhythm, no murmurs or rubs  Resp: Good air movement, clear to auscultation with no crackles or wheezes  Abdomen: Normal bowel sounds, pain on palpation of RUQ  MSK: No pain with active extension, flexion, or rotation of neck  Ext: 2+ pitting edema to mid-calves    Data   Data reviewed today: I reviewed all medications, new labs and imaging results over the last 24 hours. I personally reviewed the EKG tracing showing sinus tachycardia with atrial enlargment and the chest x-ray image(s) showing L pleural effusion, unconcerning for pneumonia.    Most Recent 3 CBC's:  Recent Labs   Lab Test 08/17/20  1211 08/17/20  1159 08/17/20  1153 08/05/20  0605 08/04/20  0732   WBC  --  16.3*  --  5.4 5.1   HGB 14.3 12.4 Khushi Padilla (ROMAINE) called on 08/17/2020 at 1230 to cancel because the possible hemolysis 10.7* 11.3*   MCV  --  118*  --  126* 121*   PLT  --  367  --  176 223      Most Recent 3 BMP's:  Recent Labs   Lab Test 08/17/20  1211 08/17/20  1159 08/17/20  1153 08/05/20  0605 08/04/20  0732    141 Khushi Padilla (ROMAINE) called on 08/17/2020 at 1230 to cancel because the possible hemolysis 141 140   POTASSIUM 2.5* 2.5* Khushi BALBUENA) called on 08/17/2020 at 1230 to cancel because the possible hemolysis 3.5 3.7   CHLORIDE  --  98  --  108 109   CO2  --  30  --  27 24   BUN  --  6*  --  5* 5*   CR  --  0.56  --  0.45* 0.50*   ANIONGAP  --  13  --  6 7   ANNABELLE  --  8.1*  --  7.5* 7.9*   GLC 55* 51* Khushi BALBUENA) called on 08/17/2020 at 1230 to cancel because the possible hemolysis 69* 75     Most Recent 2 LFT's:  Recent Labs   Lab Test 08/17/20  1159 08/05/20  0605   * 79*   ALT 59* 61*   ALKPHOS 472* 308*   BILITOTAL 2.6* 1.3     Most Recent 3 INR's:  Recent Labs   Lab Test 08/01/20  1051 12/20/17  0823 09/15/17  0605   INR 1.10 0.99 0.98     Most Recent 3 Troponin's:  Recent Labs   Lab Test 08/17/20  1159 08/01/20  2210 08/01/20  1747   TROPI <0.015 0.117* 0.132*     Most Recent 3 BNP's:  Recent Labs   Lab Test 08/17/20  1159 08/01/20  1218   NTBNPI 1,855* 3,244*     Most Recent 6 glucoses:  Recent Labs   Lab Test 08/17/20  1211 08/17/20  1159 08/17/20  1153 08/05/20  0605 08/04/20  0732 08/02/20  0454   GLC 55* 51* Khushi BALBUENA) called on 08/17/2020 at 1230 to cancel because the possible hemolysis 69* 75 62*     Most Recent Urinalysis:  Recent Labs   Lab Test 08/17/20  1251   COLOR Yellow   APPEARANCE Clear   URINEGLC Negative   URINEBILI Small*   URINEKETONE 10*   SG 1.012   UBLD Negative   URINEPH 6.0   PROTEIN 10*   NITRITE Negative   LEUKEST Trace*   RBCU <1   WBCU 1     Most Recent CPK:  Recent Labs   Lab Test 08/17/20  1159   *     Recent Results (from the past 24 hour(s))   CT Head w/o Contrast    Narrative    CT HEAD W/O CONTRAST 8/17/2020 12:27 PM    History: weakness, fall     Comparison: 8/1/2020 dated head CT    Technique: Using  multidetector thin collimation helical acquisition  technique, axial, coronal and sagittal CT images from the skull base  to the vertex were obtained without intravenous contrast.    Findings: There is no intracranial hemorrhage, mass effect, or midline  shift. Gray/white matter differentiation in both cerebral hemispheres  is preserved. Ventricles are proportionate to the cerebral sulci. The  basal cisterns are clear. Mild diffuse cerebral volume loss. Scattered  periventricular hypodensities, consistent with chronic small vessel  ischemic disease given the patient's age.    The bony calvaria and the bones of the skull base are normal. The  visualized portions of the paranasal sinuses and mastoid air cells are  clear.       Impression    Impression:  No acute intracranial pathology.     BRITTANI VILLARREAL MD   XR Chest 2 Views    Narrative    Exam: XR CHEST 2 VW, 8/17/2020 12:28 PM    Indication: weak, fall    Comparison: 8/1/2020    Findings:   Extensive spinal fusion hardware in the thoracolumbar spine. Trachea  is midline. Heart size is normal. Atherosclerotic calcifications at  the aortic arch. Small left pleural effusion. No substantial right  pleural effusion. No pneumothorax. Partial left rib resection.  Surgical clips in the right axilla related to prior right lumpectomy.  Degenerative changes of the right shoulder. No focal consolidative  opacity.      Impression    Impression:   1. Small left pleural effusion. The lungs are otherwise relatively  clear. Right lumpectomy.    I have personally reviewed the examination and initial interpretation  and I agree with the findings.    LUCINDA VARGAS MD   Abdomen US, limited (RUQ only)    Narrative    EXAMINATION: Limited Abdominal Ultrasound, 8/17/2020 1:52 PM     COMPARISON: Ultrasound 8/3/2020.    HISTORY: Elevated liver labs.    FINDINGS:   Fluid: No evidence of ascites or pleural effusions.    Liver: The liver demonstrates increased echogenicity and  coarsened  echotexture, measuring 16 cm cm in craniocaudal dimension. There is no  focal mass.     Gallbladder: There is no wall thickening, pericholecystic fluid,  positive sonographic White's sign or evidence for cholelithiasis.  Small amount of layering echogenicity likely representing sludge.    Bile Ducts: Redemonstration of dilated tubular structure anterior to  the IVC consistent with common bile duct based on CT comparison from  11/15/2017. Currently measuring 14 mm. Measured 13 mm on 8/3/2020  ultrasound. Measured up to 16 mm on 11/15/2017 CT.     Pancreas: Obscured by overlying bowel gas.    Kidney: The right kidney measures 10.2 cm long. There is no  hydronephrosis or hydroureter, no shadowing renal calculi, cystic  lesion or mass.       Impression    IMPRESSION:   1.  Dilated common bile duct measuring 13 mm. No significant change in  diameter since 8/3/2020, somewhat improved since CT abdomen pelvis  dated 11/15/2017, when it measured up to 16 mm. If there is  significant and persistent clinical concern for choledocholithiasis or  other obstructing pathology at the level of distal bile duct, MRCP  could be obtained.  2.  No direct sonographic evidence of cholelithiasis or  choledocholithiasis. Small amount of biliary sludge.  3.  Hepatic steatosis.    I have personally reviewed the examination and initial interpretation  and I agree with the findings.    ALTAGRACIA BARBER MD     Internal Medicine Staff Addendum  Date of Service: 8/17/2020  I have seen and examined Ms. Blanco, reviewed the data and discussed the plan of care with the patient and the care team on P&FC Rounds.  I agree with the above documentation     I discussed pt's care with bedside RN, case management/social work today.  I personally reviewed labs, medications and past 24 hr notes.  Assessment/Plan/Diagnoses: plan/dx as above, which contains my edits and reflects our joint medical decision-making.     Naldo Carroll MD  Internal  Medicine/Pediatrics Hospitalist & Staff Physician   of Internal Medicine and Pediatrics  [facility]  Pager: [phone]

## 2020-08-17 NOTE — ED TRIAGE NOTES
BIBA after sliding off her bed onto the floor last night. Patient reports she was unable to find a phone to call for help and was too weak to get up so slept on floor until this morning. Patient denies pain. Patient reports she was also seen for a fall 10 days ago.

## 2020-08-17 NOTE — PHARMACY-ADMISSION MEDICATION HISTORY
Admission Medication History Completed by Pharmacy    See [facility] Admission Navigator for allergy information, preferred outpatient pharmacy, prior to admission medications and immunization status.     Medication History Sources:     Patient via Ipad, [facility]    Changes made to PTA medication list (reason):    Added: Potassium    Deleted: None    Changed: None    Additional Information:    None    Prior to Admission medications    Medication Sig Last Dose Taking? Auth Provider   Acetaminophen (TYLENOL EXTRA STRENGTH PO) Take 1,000 mg by mouth 3 times daily And 500 mg by mouth 2 times daily as needed Past Week at Unknown time Yes Reported, Patient   aspirin 81 MG EC tablet Take 81 mg by mouth daily 8/16/2020 at Unknown time Yes Reported, Patient   doxepin (SINEQUAN) 10 MG capsule Take 20 mg by mouth At Bedtime  8/16/2020 at Unknown time Yes Unknown, Entered By History   furosemide (LASIX) 40 MG tablet Take 0.5 tablets (20 mg) by mouth daily as needed 8/16/2020 at Unknown time Yes Dipak Berger MD   gabapentin (NEURONTIN) 100 MG capsule Take 400 mg by mouth At Bedtime 8/16/2020 at Unknown time Yes Unknown, Entered By History   loperamide (IMODIUM A-D) 2 MG tablet Take 2 mg by mouth daily as needed Give 4mg with first loose stool AND Give 2 mg by mouth as needed for Loose Stools 2 mg with each subsequent loose stool episode after initial 4 mg first dose. NOT TO EXCEED 16 MG IN 24 Past Week at Unknown time Yes Reported, Patient   MELATONIN PO Take 10 mg by mouth At Bedtime  8/16/2020 at Unknown time Yes Reported, Patient   metoprolol succinate ER (TOPROL-XL) 25 MG 24 hr tablet Take 12.5 mg by mouth every morning 8/17/2020 at Unknown time Yes Unknown, Entered By History   omeprazole (PRILOSEC) 20 MG DR capsule Take 1 capsule by mouth 3 times daily before meals and at bedtime as needed. 8/17/2020 at Unknown time Yes Unknown, Entered By History   Pediatric Multivit-Minerals-C (CHEWABLES MULTIVITAMIN PO) Take 1  tablet by mouth daily  8/17/2020 at Unknown time Yes Unknown, Entered By History   tiZANidine (ZANAFLEX) 4 MG tablet Take 4 mg by mouth At Bedtime 8/16/2020 at Unknown time Yes Unknown, Entered By History   vitamin D2 (ERGOCALCIFEROL) 29431 units (1250 mcg) capsule Take 50,000 Units by mouth once a week Past Week at Unknown time Yes Unknown, Entered By History   OTHER MEDICAL SUPPLIES every morning Daily weights.   Reported, Patient   OTHER MEDICAL SUPPLIES 4 times daily BP checks qid.   Reported, Patient   OTHER MEDICAL SUPPLIES Legs: Black socks and Compriflex LEs. Thigh high wraps. Leave on at all times. If soiled may remove thigh portions.    every shift   Reported, Patient   potassium chloride (KLOR-CON) 20 MEQ packet Take 20 mEq by mouth every morning Unknown at Unknown time  Unknown, Entered By History       Date completed: 08/17/20    Medication history completed by: Mell Mackey [facility]

## 2020-08-18 NOTE — PLAN OF CARE
Neuro: Answers orientation questions appropriately but hallucinates and makes confused statements at times when first woken up from a deep sleep.  Cardiac: SR with HR in 60s.  BP's were low at beginning of shift in 70s-80s/40s-50s; team notified, fluids given, BP now 90s-100s/50s-80s.  Afebrile.  Respiratory: Sating >92% on RA.  GI/: Oliguria with only 100cc out all night; team notified.  Post void residual bladder scan was 4ml.   Diet/appetite: Regular diet; poor appetite.  Activity:  Assist of 1 with gait belt and FWW.  Ambulates to bathroom, positions self in bed.  Pain: At acceptable level on current regimen.   Skin: Scabbing and bruising all over from falls.  LDA's: Left PIV running LR @75ml/hr.    Plan: Continue with POC. Notify primary team with changes.

## 2020-08-18 NOTE — PLAN OF CARE
Care provided 18:30 - 23:30    Neuro: A&Ox4. Follows all commands.  Cardiac: SR HR 70s - 90s. VSS.   Respiratory: Sating >96 on RA.  GI/: Adequate urine output. No BM.  Diet/appetite: Tolerating regular diet, appetite poor.  Activity:  Assist of 1, up in room  Pain: At acceptable level on current regimen.   Skin: No new deficits noted.  LDA's: L PIV: tko.    Plan: Continue  with POC. Notify primary team with changes.

## 2020-08-18 NOTE — UTILIZATION REVIEW
Admission Status; Secondary Review Determination       Under the authority of the Utilization Management Committee, the utilization review process indicated a secondary review on the above patient. The review outcome is based on review of the medical records, discussions with staff, and applying clinical experience noted on the date of the review.     (x) Inpatient Status Appropriate - This patient's medical care is consistent with medical management for inpatient care and reasonable inpatient medical practice.     RATIONALE FOR DETERMINATION   75 year old woman with past medical history significant for CAD s/p PCI with ADOLOF x2 4/2018, remote breast cancer, esophageal stricture, and recent hospitalization for weakness and fall with negative workup, admitted on 8/17 again for weakness resulting in an atraumatic fall. Similar to the previous hospitalization, on admission, she was noted to be hypokalemic, hypoglycemic, malnourished, and had biliary obstructive pathology. Polypharmacy: She is also taking benadryl doxepin, tizandidine, and furosemide; polypharmacy likely contributing.  Electrolyte abnormalities:  hypokalemic to 2.5 which can cause weakness, also found to be hypoglycemic. The expected length of stay at the time of admission was more than 2 nights because of the severity of illness, intensity of service provided, and risk for adverse outcome. Inpatient admission is appropriate.     This document was produced using voice recognition software       The information on this document is developed by the utilization review team in order for the business office to ensure compliance. This only denotes the appropriateness of proper admission status and does not reflect the quality of care rendered.   The definitions of Inpatient Status and Observation Status used in making the determination above are those provided in the CMS Coverage Manual, Chapter 1 and Chapter 6, section 70.4.   Sincerely,   DAISY MARINA,  MD   System Medical Director   Utilization Management   [facility].

## 2020-08-18 NOTE — PROVIDER NOTIFICATION
Time of notification: 12:06 am  Provider notified: ciarra stringer  Patient status: BP dropped to 70s-80s/40s-60s  Temp:  [97.8  F (36.6  C)-98.6  F (37  C)] 97.8  F (36.6  C)  Pulse:  [] 58  Heart Rate:  [] 63  Resp:  [16] 16  BP: ()/() 79/54  SpO2:  [94 %-100 %] 96 %  Orders received: 500ml LR bolus, k+ replacement for K+ of 3.0, check mg+, notify team of pt status when bolus is done, monitor for worsening hallucinations.

## 2020-08-18 NOTE — PROGRESS NOTES
CLINICAL NUTRITION SERVICES - ASSESSMENT NOTE     Nutrition Prescription    RECOMMENDATIONS FOR MDs/PROVIDERS TO ORDER:  Encourage oral intake     Consider use of appetite stimulant such as remeron, megace, or marinol as medically appropriate if patient continues to have low appetite    Monitor K/Mg++ and Po4- replace electrolytes if below normal, monitor for refeeding syndrome.     Malnutrition Status:    Non-severe malnutrition in the context of acute on chronic illness    Recommendations already ordered by Registered Dietitian (RD):  Patient to order supplements PRN    Future/Additional Recommendations:  Monitor appetite, intake and tolerance to meals. If patient is consuming at or below 50% nutritionally adequate meals- consider starting calorie count and recommend scheduling ONS.     If unable to consume 60% of low end estimated energy and protein (765 calories and 30 g protein) with supplements, recommend starting enteral nutrition  Nutren 1.5 @ 35 mL/hr to provide 1260 kcals (30 kcal/kg/day), 57 g PRO (1.3 g/kg/day), 638 mL H2O, 148 g CHO and no Fiber daily.  -Start at 10 ml/hr and advance by 10 q 6-8 hours   --Do not advance if K+/Mg++ below normal or Po4 <1.9. Hold at current volume and replace electrolytes  --Recommend 15 ML certavite daily      REASON FOR ASSESSMENT  Minerva Blanco is a/an 75 year old female assessed by the dietitian for Admission Nutrition Risk Screen for reduced oral intake over the last month and Provider Order - Malnutrition     CLINICAL HISTORY  past medical history significant for CAD s/p PCI with ADOLFO x2 4/2018, remote breast cancer, esophageal stricture, and recent hospitalization for weakness and fall with negative workup, admitted on 8/17 again for weakness resulting in an atraumatic fall.     NUTRITION HISTORY  Patient reported she does not have much of an appetite x6 weeks. When she was in the hospital 2 weeks ago it was a little better but then worsened at home.   Takes  "prilosec and hour before a meal in the morning   B-Cereal (Bran flakes or raisin bran), Juice  L- Tuna vs cheese vs peanut butter +crackers and fruit or soup   D- April calendars microwave meals or barak or chicken   Snacks: late afternoon- chips and salsa \"snacky foods\"   Hasn't made herself a homemade meal in about 6 weeks.   She monitors her chewing/swallowing due to history of esophagus stricture  Reports some very loose stools this AM- had cream of wheat, hard boiled egg, blueberry muffin, thought this was too much for her stomach after not eating much yesterday due to being in the ER. She does take  imodium for irritable bowel syndrome as needed.     CURRENT NUTRITION ORDERS  Diet: Regular  Intake/Tolerance: 75%     LABS  Glucose: 51 (L, 8/17)   B12 1679 (H)    MEDICATIONS  Prilosec   Potassium Chloride   Thiamine  LR @ 75 m/hr     ANTHROPOMETRICS  Height: 152.4 cm (5' 0\")  Most Recent Weight: 41.7 kg (91 lb 14.9 oz)    IBW: 45.5 kg  BMI: Underweight BMI <18.5  Weight History:   Reported UBW of 95 lbs (43 kg). About 1 year ago reported weight of 85 lbs and she had been able to gain weight. When asked about weight ~ 10 lbs last May, pt reported she has surgery and pitting edema.    Wt Readings from Last 15 Encounters:   08/17/20 41.7 kg (91 lb 14.9 oz)   08/04/20 43.7 kg (96 lb 4.8 oz)   06/23/19 48.9 kg (107 lb 12.8 oz)   06/17/19 46.7 kg (103 lb)   06/12/19 46.4 kg (102 lb 6.4 oz)   06/05/19 49.2 kg (108 lb 8 oz)   05/28/19 50.2 kg (110 lb 9.6 oz)   05/22/19 47.7 kg (105 lb 3.2 oz)   05/18/19 47.4 kg (104 lb 9.6 oz)   05/13/19 48.1 kg (106 lb)   05/13/19 48.4 kg (106 lb 9.6 oz)   05/09/19 50.2 kg (110 lb 9.6 oz)   05/08/19 51.2 kg (112 lb 12.8 oz)   05/23/18 43.5 kg (95 lb 14.4 oz)   03/28/18 46.3 kg (102 lb)     Dosing Weight: 42 kg (actual, lowest weight)     ASSESSED NUTRITION NEEDS  Estimated Energy Needs: 3743-5749+ kcals/day (30 - 35 kcals/kg )  Justification: Increased needs/Underweight   Estimated " Protein Needs: 50-63 grams protein/day (1.2 - 1.5 grams of pro/kg)  Justification: Increased needs  Estimated Fluid Needs: 0718-9325 mL/day (1 mL/kcal)   Justification: Maintenance and Per provider pending fluid status    PHYSICAL FINDINGS  Reviewed per physician and nursing notes     MALNUTRITION  % Intake: </=75% for >/= 1 month (severe)  % Weight Loss: Up to 5% in 1 month (non-severe)  Subcutaneous Fat Loss: Unable to assess  Muscle Loss: Unable to assess  Fluid Accumulation/Edema: Mild  Malnutrition Diagnosis: Non-severe malnutrition in the context of acute on chronic illness     NUTRITION DIAGNOSIS  Inadequate oral intake related to decreased appetite as evidenced by patient report of reduced intake and report of 4% weight loss from usual weight.      INTERVENTIONS  Implementation  Nutrition Education: RD role in care, use of nutrition supplements to increase caloric intake    Medical food supplement therapy- see above     Goals  Patient to consume % of nutritionally adequate meal trays TID, or the equivalent with supplements/snacks.     Monitoring/Evaluation  Progress toward goals will be monitored and evaluated per protocol.    Robyn Hahn RD, LD  6B pager: [phone]

## 2020-08-18 NOTE — PROGRESS NOTES
08/18/20 1000   Quick Adds   Type of Visit Initial PT Evaluation      Language English   Living Environment   Lives With alone   Living Arrangements independent living facility   Home Accessibility no concerns   Transportation Anticipated car, drives self   Living Environment Comment Pt lives in [address] building alone. She had a daughter in the state, but not very easy to access. Other daughter lives out of state. Pt reports she does drive, but has been driving less recently.    Self-Care   Usual Activity Tolerance moderate   Current Activity Tolerance fair   Regular Exercise No   Equipment Currently Used at Home cane, quad;walker, rolling;shower chair;grab bar, toilet;grab bar, tub/shower   Activity/Exercise/Self-Care Comment Pt is MOD I for ADLs. She has been ordering groceries.    Functional Level Prior   Ambulation 1-->assistive equipment   Transferring 1-->assistive equipment   Toileting 0-->independent   Bathing 1-->assistive equipment   Communication 0-->understands/communicates without difficulty   Swallowing 0-->swallows foods/liquids without difficulty   Cognition 0 - no cognition issues reported   Fall history within last six months yes   Number of times patient has fallen within last six months 2   Which of the above functional risks had a recent onset or change? ambulation;fall history   Prior Functional Level Comment Pt is usually MOD I for mobility with use of 4WW. She usually has to ambulate ~300ft to reach elevator. She had 1 fall where her knees buckled, leading to prior admission. Current fall she slipped off the EOB and could not stand from floor height.    General Information   Onset of Illness/Injury or Date of Surgery - Date 08/17/20  (date of admission.)   Referring Physician Zaira Caruso MD    Patient/Family Goals Statement To improve strength   Pertinent History of Current Problem (include personal factors and/or comorbidities that impact the POC) Pt is a 75  year old woman with past medical history significant for CAD s/p PCI with ADOLFO x2 4/2018, remote breast cancer, esophageal stricture, and recent hospitalization for weakness and fall with negative workup, admitted on 8/17 again for weakness resulting in an atraumatic fall. Similar to the previous hospitalization, on admission, she was noted to be hypokalemic, hypoglycemic, malnourished, and had biliary obstructive pathology.  - per resident note   Precautions/Limitations fall precautions   General Observations Pt sitting up in chair and agreeable to activity   General Info Comments Activity: up with assist   Cognitive Status Examination   Orientation orientation to person, place and time   Level of Consciousness alert   Follows Commands and Answers Questions 100% of the time;able to follow multistep instructions   Personal Safety and Judgment intact   Memory intact   Pain Assessment   Patient Currently in Pain No   Integumentary/Edema   Integumentary/Edema no deficits were identifed   Posture    Posture Forward head position;Protracted shoulders   Range of Motion (ROM)   ROM Comment B UE/LE WFL   Strength   Strength Comments B LE 4/5 grossly   Bed Mobility   Bed Mobility Comments SBA per report - did not observe today   Transfer Skills   Transfer Comments Sit <> stand at CGA with 4WW   Gait   Gait Comments Pt ambulating with 4WW at CGA with flexed trunk posture, decreased step length   Balance   Balance Comments Sitting static normal. Sitting dynamic good. Standing static fair. Standing dynamic poor without 4WW.    Sensory Examination   Sensory Perception Comments Pt denies acute changes   General Therapy Interventions   Planned Therapy Interventions balance training;gait training;bed mobility training;neuromuscular re-education;ROM;strengthening;stretching;transfer training;home program guidelines;risk factor education;progressive activity/exercise   Clinical Impression   Criteria for Skilled Therapeutic  "Intervention yes, treatment indicated   PT Diagnosis Impaired functional mobility   Influenced by the following impairments Impaired strength, balance, endurance, posture   Functional limitations due to impairments Impaired mobility, limiting return [facility]   Clinical Presentation Stable/Uncomplicated   Clinical Presentation Rationale Per clinical judgement   Clinical Decision Making (Complexity) Low complexity   Therapy Frequency Daily   Predicted Duration of Therapy Intervention (days/wks) 4 days   Anticipated Equipment Needs at Discharge   (none anticipated)   Anticipated Discharge Disposition Home with Home Therapy;Transitional Care Facility   Risk & Benefits of therapy have been explained Yes   Patient, Family & other staff in agreement with plan of care Yes   [facility] TM \"6 Clicks\"   2016, Trustees of [facility], under license to [employer].  All rights reserved.   6 Clicks Short Forms Basic Mobility Inpatient Short Form   [facility]  \"6 Clicks\" V.2 Basic Mobility Inpatient Short Form   1. Turning from your back to your side while in a flat bed without using bedrails? 4 - None   2. Moving from lying on your back to sitting on the side of a flat bed without using bedrails? 4 - None   3. Moving to and from a bed to a chair (including a wheelchair)? 4 - None   4. Standing up from a chair using your arms (e.g., wheelchair, or bedside chair)? 4 - None   5. To walk in hospital room? 4 - None   6. Climbing 3-5 steps with a railing? 3 - A Little   Basic Mobility Raw Score (Score out of 24.Lower scores equate to lower levels of function) 23   Total Evaluation Time   Total Evaluation Time (Minutes) 6     "

## 2020-08-18 NOTE — PROGRESS NOTES
Transfer  Transferred from: ED  Via:bed  Reason for transfer: Pt appropriate for 6B  Family: Aware of transfer  Belongings: Received with pt  Chart: Received with pt  Medications: Meds received from old unit with pt  Code Status verified on armband: yes/no  2 RN Skin Assessment Completed By: Christina Santos rec completed: no  Bed surface reassessed with algorithm and charted: yes  New bed surface ordered: no    Report received from: Khushi GAVIN  Pt status: A&Ox4, VSS. Sat >96 on RA. Denies pain

## 2020-08-18 NOTE — PLAN OF CARE
BP 96/74 (BP Location: Left arm)   Pulse 93   Temp 98  F (36.7  C) (Oral)   Resp 16   Ht 1.524 m (5')   Wt 41.7 kg (91 lb 14.9 oz)   SpO2 97%   BMI 17.95 kg/m       Neuro: A/Ox4. Forgetful at times. Uses call light appropriately  Cardiac: SBP 90-100s. SR with HR 80-90s. Afebrile. +2 edema in BLE  Respiratory: RA. OSORIO. Clear lung sounds  GI/: Voiding without difficulty. BMx3, imodium given x1.   Diet/Appetite: Regular diet, fair appetite. Denies nausea.   Skin: Scabs and bruises on body r/t recent falls. No new skin deficits noted.   LDA: PIV x1 infusing LR at 75 mL/hr.   Activity: SBA with walker and GB. Walked halls with staff x3.   Pain: Tylenol given x2 for c/o generalized aches with relief.   Plan: Report given to  RN. Pt awaiting transport to transfer to . Continue to monitor and follow POC. Notify MD with any changes or concerns

## 2020-08-18 NOTE — PROGRESS NOTES
Resident/Fellow Attestation   I, Zaira Caruso, was present with the medical student who participated in the service and in the documentation of the note.  I have verified the history and personally performed the physical exam and medical decision making.  I agree with the assessment and plan of care as documented in the note.         Zaira Caruso MD  Medicine-Pediatrics PGY4  Pager # [phone]    [facility], [place]    Progress Note - [facility] 3 Service        Date of Admission:  8/17/2020    Assessment & Plan   Minerva Blanco is a 75 year old woman with past medical history significant for CAD s/p PCI with ADOLFO x2 4/2018, remote breast cancer, esophageal stricture, and recent hospitalization for weakness and fall with negative workup, admitted on 8/17 again for weakness resulting in an atraumatic fall. Similar to the previous hospitalization, on admission, she was noted to be hypokalemic, hypoglycemic, malnourished, and had biliary obstructive pathology.     Changes today  - Daughter reports significant alcohol use, differs from what Minerva reports  - Smear and retic count to investigate macrocytic anemia pending  - Vitamin B1 injection  - CT abdomen and pelvis showed no masses   - Discontinued doxepin  - Started mirtazapine 15 mg    Failure to thrive   Weakness with hx of falls    Likely multifactorial: considering poor nutrition, alcohol use, polypharmacy, electrolyte abnormalities, and depression. Hypotensive overnight 8/18, see below.   - electrolyte protocol, as below   - Nutrition consult  - PT consult, recommending okay to discharge to home if she stays a few days to regain strength, otherwise to TCU  - OT consult for MOCA  - Mirtazapine    Hypokalemia  Up to 3.7 8/18 up from 2.5 on admission. Bicarb 29 8/18 down from 32 on admission. Likely from poor PO intake + home lasix and missing home potassium supplement, evaluating renal tubular acidosis.    -Continue to monitor  -Potassium urine pending 8/18    Hypoglycemia   On admission, BG noted to be 51 and responded to PO intake + D5 bolus 8/17. Likely 2/2 insufficient caloric intake.   - Hypoglycemia protocol   - Consider insulin levels for insulinoma if continues and more common etiologies ruled out (see above)    Suspected hepatobiliary obstruction  Obstructive labs downtrending to high baseline after fluid boluses, continues to have RUQ pain on palpation. CT chest ab pelvis to evaluate for malignancy showed steatatosis and CBD dilation but no masses  - INR to evaluate liver synthetic function    Macrocytic anemia  Hb 12.4 on admission, likely hemoconcentrated, down to 9.5 with  on 8/18 after fluid bolus. Vitamin B12 and folate wnl, thiamine two weeks ago wnl. Reports 1 alcoholic drink/day, daughter reports significantly more.   - Blood smear with retic count    Esophageal stricture  Dysphagia  2/2 Nissen fundoplication in 1/2016 for hiatal hernia that resulted in 3 years needing a feeding tube. Esophagram at prior hospitalization revealed small diverticulum but no need for follow up. Likely contributing to this and prior hospitalization.   - PTA omeprazole 20 mg    LYUBOV  Cr 0.56 on admission, 0.84 next morning and hyaline casts on UA concerning for prerenal. S/p 500 mL bolus x2, now. Low urine output in chart not accurate, nurse reports unmeasured urine with stool, did appear dark yellow at first, but lightening in color afternoon 8/18.  - IVF 75 mL/hr  - Monitor urine output    Hypotension  SBP in 70s overnight 8/17-18, resolved with fluid bolus. Orthostatics 8/18 showed 116/69 supine to 99/62 standing. Concerning for adrenal insufficiency, doesn't fit with hypokalemia.   - IVF as above  - Cortisol 8/18 AM    Hallucinations  Overnight 8/17-18 had hallucinations of flowers on walls and cat in corner of room. Has experienced in past with doxepin 4 mg, on 2 mg overnight. Hospital delirium most likely.  Alcoholic hallucinations possible, no tremors that would be concerning for delirium tremens. Chronic dementia unlikely given no history of unprovoked hallucinations.   - Discontinued doxepin  - Can implement CIWA if shows more signs of withdrawal, will hold for now due to concern for sedation with benzos    Malnutrition  Low oral intake  Underlying  psychosocial vs malignancy vs esophageal stricture vs alcohol use.  - RD following, appreciate recs     Alcohol use  Minerva reports 1 glass of wine per night, daughter says that she spent more on alcohol in the last month than on food. Clinical picture fits use (AST:ALT elevation, chronic malnutrition/failure to thrive, macrocytic anemia, hallucinations).  -  Discuss further with Minerva in the AM  - Phosphatidylethanol level in AM  - Gave vitamin B1, level was normal 8/1    Diarrhea  Ongoing for years, no episodes in the past month until 8/18.  - PTA loperamide  - Monitor for change     CAD s/p ADOLFO 2018   - ASA 81 mg PTA  - Hold PTA metoprolol 12.5 PO for hypotension    Leukocytosis, resolved   WBC 16.3 on admission, resolved to 6.4 with fluids next day.    Hypocalcemia, resolved  Resolved with repletion.    Hypomagnesia, resolved  Resolved with repletion, may have contributed to hypokalemia.  - Monitor    Headache, resolved  R frontal headache on admission, resolved 8/18.    Social   Daughter is Chandan [phone]       Diet: Regular adult diet    Fluids: PO, 75 mL/hr LR  Lines: L PIV  DVT Prophylaxis: Low Risk/Ambulatory with no VTE prophylaxis indicated  Whitt Catheter: not present  Code Status: DNR/DNI         Disposition Plan   Expected discharge: 2 - 3 days, recommended to prior living arrangement once can safely ambulate independently.  Entered: Sapphire Rodriguez 08/18/2020, 10:56 AM       The patient's care was discussed with the Attending Physician, Dr. Shree Tran.    Sapphire Rodriguez, MS4  [place] 3 Service  [facility],  [place]  Pager: [phone]  Please see sticky note for cross cover information    Pt was seen and examined by me; reviewed labs, vitals, imaging.  I agree with the detailed A/P documentation above.  May have clinical depression, she is willing to try mirtazapine which will also help with appetite.  Continue with PT and nutrition.    Sarina Swann MD  ______________________________________________________________________    Interval History   Overnight hypotension with SBPs into 70s, responded to IVF bolus. Also had hallucinations overnight, reported seeing flowers on the wall and a cat in the corner. Happened before when her PCP tried increasing her doxepin to 4 mg, was on PTA 2 mg doxepin overnight. Started having diarrhea today, iMnerva reports unchanged from her normal IBS diarrhea. She also reports having very little diarrhea in weeks leading up to this hospitalization. She says that she has been very lonely and isolated lately due to COVID and her 's death two years ago, she thinks this is contributing to her loss of appetite.   I also spoke with her daughter, Chandan, about [place]. Chandan was able to look at Minerva's bank account and saw that she has spent more money on alcohol in the past month than on food. She says that prior to his death two years ago, her father often found bottles of alcohol around the house. When she moved Minerva out of her house a year ago, she also found bottles hidden throughout the house. When she has spent the night with her mother she has been unable to wake her, she believes because of alcohol use. When she spoke on the phone with Minerva after her fall on 8/17, Chandan reports that Minerva was slurring her words and sounded drunk. She is also concerned that Minerva is not following up with medical appointments and thinks her use has contributed to several falls in the past. Minerva continues to say that she drinks 1 glass of wine with dinner and up to two drinks  when she is out with friends.     Data reviewed today: I reviewed all medications, new labs and imaging results over the last 24 hours. I personally reviewed the chest CT image and abdominal CT image(s) showing no evidence of metastatic disease. Showed multiple nodular opacities in both lungs likely 2/2 infection or aspiration, dilated common bile duct, and diffuse hepatic steatosis.     Physical Exam   Vital Signs: Temp: 97.5  F (36.4  C) Temp src: Oral BP: 104/68 Pulse: 95 Heart Rate: 63 Resp: 16 SpO2: 94 % O2 Device: None (Room air)    Weight: 91 lbs 14.91 oz  General: Pale, frail-appearing, conversant  Neruo: R pupil larger than L, extraocular eye movements intact, CN III - XII grossly intact  HEENT: Atraumatic, normocephalic, no photophobia  CV: Normal rate and rhythm, no murmurs or rubs  Resp: Good air movement, clear to auscultation with no crackles or wheezes  Abdomen: Normal bowel sounds, pain on palpation of RUQ  MSK: No pain with active extension, flexion, or rotation of neck  Ext: 2+ pitting edema to mid-calves, no tremor    Data   Recent Labs   Lab 08/18/20  0407 08/17/20  2345 08/17/20  1211 08/17/20  1159   WBC 6.4  --   --  16.3*   HGB 9.5*  --  14.3 12.4   *  --   --  118*     --   --  367     --  142 141   POTASSIUM 3.7 3.0* 2.5* 2.5*   CHLORIDE 102  --   --  98   CO2 29  --   --  30   BUN 8  --   --  6*   CR 0.84  --   --  0.56   ANIONGAP 8  --   --  13   ANNABELLE 7.4*  --   --  8.1*   GLC 86  --  55* 51*   ALBUMIN 1.7*  --   --  2.6*   PROTTOTAL 4.8*  --   --  7.3   BILITOTAL 1.7*  --   --  2.6*   ALKPHOS 328*  --   --  472*   ALT 41  --   --  59*   AST 83*  --   --  108*   TROPI  --   --   --  <0.015     Recent Results (from the past 24 hour(s))   CT Chest/Abdomen/Pelvis w Contrast    Narrative    EXAMINATION: CT CHEST/ABDOMEN/PELVIS W CONTRAST, 8/18/2020 12:01 PM    TECHNIQUE: Helical CT images from the thoracic inlet through the  symphysis pubis were obtained with intravenous  contrast. Contrast  dose: iopamidol (ISOVUE-370) solution 55 mL    COMPARISON: Ultrasound abdomen 8/17/2020    HISTORY: Neoplasm: extra-abdominal primary. Remote history of breast  cancer.    FINDINGS:    Lower neck: Visualized portions of the lower neck and thyroid gland  are unremarkable.    Chest:   Lungs and pleura: Multiple nodular opacities with a branching pattern  are noted in both lungs, predominantly in the lower lobes and inferior  opacities in the right upper lobe, a few examples seen on series 4,  images 78, 99, 124 and 13. No focal consolidation or mass. No pleural  effusion or pneumothorax.  Mediastinum: No lymphadenopathy by size criteria.  Heart and great vessels: Heart is normal in size and there is no  pericardial effusion. Aorta and main pulmonary artery are within  normal limits in caliber. Moderate triple-vessel coronary artery  calcifications.    Abdomen and pelvis:  Liver: Diffuse markedly decreased attenuation suggestive of steatosis.  Biliary/Gallbladder: No CT evidence of calculi, wall thickening or  pericholecystic fluid. There is no intrahepatic biliary dilatation.  The CBD is dilated up to 1 cm in the proximal portion and shows normal  tapering distally. There is a peritoneal implant noted diverticulum.  No focal intraluminal or extraluminal mass is detected.  Spleen: Normal size. No focal lesions.  Pancreas: No evidence of pancreatic mass or peripancreatic fluid.  Adrenals: Normal.  Kidneys: No hydronephrosis, calculi or mass.  Urinary bladder: Decompressed.  Reproductive organs: Prostate and seminal vesicles are unremarkable.  Gastrointestinal: Postsurgical changes of esophagectomy and  retrosternal gastric pull up surgery. Small and large bowel are normal  in caliber and without abnormal wall thickening.  Mesentery/Peritoneum: No ascites or pneumoperitoneum.  Lymph nodes: No lymphadenopathy.  Vasculature: Mild atherosclerotic calcifications. IVC is normal.     Bones and soft tissues:  No acute abnormality. Posterior spinal  fixation hardware is noted in the thoracolumbar region. No destructive  osseous lesions. Postsurgical changes in the right breast. Mild  diffuse anasarca.      Impression    IMPRESSION:  1.  No evidence of metastatic disease in the chest, abdomen or pelvis.  2.  Multiple nodular opacities with a branching pattern are noted in  both lungs are likely due to infection or aspiration. Recommend follow  up to confirm resolution.  3.  Mildly dilated common bile duct without CT evidence for  choledocholithiasis or mass. The dilatation could be due to mass  effect form periampullary duodenal diverticulum.  4.  Diffuse hepatic steatosis.     KATI LAM MD

## 2020-08-18 NOTE — PLAN OF CARE
PT 6B: Up with SBA using FWW and gait belt. Please assist pt to ambulate 3x/day.     Discharge Planner PT   Patient plan for discharge: Home  Current status: Evaluation complete and treatment indicated. Engaged pt in transfers at A-CGA, gait with 4WW at A ~75ft x 2 bouts with rest break between bouts. Pt fatigued with activity.   Barriers to return to prior living situation: medical status, lives alone  Recommendations for discharge: If pt were to discharge to day, recommend TCU. Anticipate with 1-3 more admission days, pt will likely be able to return home with HHPT/OT.   Rationale for recommendations: Pt is below baseline for mobility and with recent falls. Pt will benefit from continued rehab to improve strength and endurance.        Entered by: Erin Duncan 08/18/2020 10:51 AM

## 2020-08-19 NOTE — PROGRESS NOTES
Resident/Fellow Attestation   I, Zaira Caruso, was present with the medical student who participated in the service and in the documentation of the note.  I have verified the history and personally performed the physical exam and medical decision making.  I agree with the assessment and plan of care as documented in the note.         Zaira Caruso MD  Medicine-Pediatrics PGY4  Pager # [phone]      [facility], [place]    Progress Note - [facility] 3 Service        Date of Admission:  8/17/2020    Assessment & Plan   Minerva Blanco is a 75 year old woman with past medical history significant for CAD s/p PCI with ADOLFO x2 4/2018, remote breast cancer, esophageal stricture, and recent hospitalization for weakness and fall with negative workup, admitted on 8/17 again for weakness resulting in an atraumatic fall. Similar to the previous hospitalization, on admission, she was noted to be hypokalemic, hypoglycemic, malnourished, and had elevated LFTs.    Changes today  - Started home lasix with home potassium supplementation   - Restart home metoprolol     Failure to thrive   Weakness with hx of falls    Likely multifactorial: considering poor nutrition, alcohol use, polypharmacy, electrolyte abnormalities, and depression. MOCA today showed 22/30, down from 26/30 at previous hospitalization.   - electrolyte protocol, as below   - Nutrition consult  - PT consult, recommending okay to discharge to home if she stays a few days to regain strength, otherwise to TCU  - Mirtazapine    Hypokalemia  Resolved to 3.7 8/18 up from 2.5 on admission. Likely from poor PO intake + home lasix and missing home potassium supplement, evaluating renal tubular acidosis. Potassium urine pending normal  -Continue to monitor    Hypoglycemia   On admission, BG noted to be 51 and responded to PO intake + D5 bolus 8/17. Likely 2/2 insufficient caloric intake.   - Hypoglycemia protocol     Elevated  LFTs  Obstructive labs returned to high baseline after fluid boluses. CT chest ab pelvis showed steatatosis and CBD dilation but no masses. INR 1.29.    Macrocytic anemia  Hb 12.4 on admission, likely hemoconcentrated, down to 9.5 with  on 8/18 after fluid bolus. Vitamin B12 and folate wnl, thiamine two weeks ago wnl. Reports 1 alcoholic drink/day, daughter reports significantly more.   - Blood smear with retic count pending    Esophageal stricture  Dysphagia  2/2 Nissen fundoplication in 1/2016 for hiatal hernia that resulted in 3 years needing a feeding tube. Likely contributing to this and prior hospitalization.   - PTA omeprazole 20 mg    LYUBOV, resolved  Creatinine initially increased by 0.3, resolved. Urine output improved 8/19, 900 mL by 1400.   - IVF 75 mL/hr  - Monitor urine output    Hypotension  SBP in 70s overnight 8/17-18, resolved with fluid bolus. Orthostatics 8/18 showed 116/69 supine to 99/62 standing. Afternoon cortisol wnl 8/19 and morning cortisol wnl last hospitalization, adrenal insufficiency unlikely (tech had trouble with veins in the morning 8/19)  - IVF as above  - Cortisol 8/18 AM    Hallucinations  Overnight 8/17-18 had hallucinations of flowers on walls and cat in corner of room. Hospital delirium most likely vs alcoholic hallucinations vs chronic dementia.  - Can implement CIWA if shows more signs of withdrawal, will hold for now due to concern for sedation with benzos    Malnutrition  Low oral intake  Underlying  psychosocial vs esophageal stricture vs alcohol use.  - RD following, appreciate recs   - Simethicone for discomfort while eating again    Alcohol use  Minerva reports 1 glass of wine per night, daughter says that she has had a significant problem for years (see note from 8/18).   - Phosphatidylethanol level pending  - Tizanidine interacts with alcohol and can cause liver disease, discontinue at discharge     Diarrhea  Ongoing for years, no episodes in the past  month until 8/18.  - PTA loperamide  - Monitor for change     CAD s/p ADOLFO 2018   - ASA 81 mg PTA  - PTA metoprolol 12.5 PO    Hypophosphatemia   2.3 8/19  - On replacement protocol     Leukocytosis, resolved   WBC 16.3 on admission, resolved to 6.4 with fluids next day.     Hypocalcemia, resolved  Resolved with repletion.     Hypomagnesia, resolved  Resolved with repletion, may have contributed to hypokalemia.  - Monitor     Headache, resolved  R frontal headache on admission, resolved 8/18.     Social   Daughter is Chandan [phone]       Diet: Regular adult diet    Fluids: PO, 75 mL/hr LR  Lines: L PIV  DVT Prophylaxis: Low Risk/Ambulatory with no VTE prophylaxis indicated  Whitt Catheter: not present  Code Status: DNR/DNI    Disposition Plan   Expected discharge: 2 - 3 days, recommended to prior living arrangement once can safely ambulate independently, otherwise to TCU.  Entered: Sapphire Rodriguez 08/19/2020, 1:05 PM       The patient's care was discussed with the Attending Physician, Dr. Shree Tran.    Sapphire Rodriguez, MS4  [facility] 3 Service  [facility], [place]  Pager: [phone]  Please see sticky note for cross cover information    Pt was seen and examined by me; reviewed labs, vitals, imaging. I agree with the detailed A/P documentation above.  Overall picture consistent with depression and alcohol abuse as a cause for nutritional deficiencies, weakness, falls.  She was advised to abstain from alcohol.  Started mirtazapine for depression and appetite. Needs PT vs TCU for deconditioning but is motivated to do PT here and discharge home.    Sarina Swann MD  ______________________________________________________________________    Interval History   Overnight was confused when woken at midnight, but quickly remembered where she was. Appetite increasing.     Data reviewed today: I reviewed all medications, new labs and imaging results over the last 24 hours. I personally reviewed no  images or EKG's today.    Physical Exam   Vital Signs: Temp: 95.7  F (35.4  C) Temp src: Oral BP: (!) 143/67 Pulse: 106 RETIRE: Heart Rate: 89 Resp: 16 SpO2: 97 % O2 Device: None (Room air)    Weight: 96 lbs 4.8 oz  General: Pale, frail-appearing, conversant  Neruo: R pupil larger than L, extraocular eye movements intact, CN III - XII grossly intact  HEENT: Atraumatic, normocephalic  CV: Normal rate and rhythm, no murmurs or rubs  Resp: Good air movement, clear to auscultation with no crackles or wheezes  Abdomen: Normal bowel sounds, pain on palpation of RUQ  Ext: 2+ pitting edema to mid-calves    Data   Recent Labs   Lab 08/19/20  1350 08/18/20  0407 08/17/20  2345 08/17/20  1211 08/17/20  1159   WBC 7.3 6.4  --   --  16.3*   HGB 10.3* 9.5*  --  14.3 12.4   * 121*  --   --  118*    159  --   --  367   INR 1.29*  --   --   --   --     139  --  142 141   POTASSIUM 3.5  3.6 3.7 3.0* 2.5* 2.5*   CHLORIDE 103 102  --   --  98   CO2 25 29  --   --  30   BUN 6* 8  --   --  6*   CR 0.59 0.84  --   --  0.56   ANIONGAP 10 8  --   --  13   ANNABELLE 7.9* 7.4*  --   --  8.1*   GLC 88 86  --  55* 51*   ALBUMIN 2.0* 1.7*  --   --  2.6*   PROTTOTAL 5.6* 4.8*  --   --  7.3   BILITOTAL 1.5* 1.7*  --   --  2.6*   ALKPHOS 376* 328*  --   --  472*   ALT 48 41  --   --  59*   AST 80* 83*  --   --  108*   TROPI  --   --   --   --  <0.015

## 2020-08-19 NOTE — PLAN OF CARE
5B    Discharge Planner PT   Patient plan for discharge: Home  Current status: Pt IND in bed mobility and SBA for sit<>stand transfers. Pt ambulated with FWW and SBA 15' x 2. Pt manuevers 4 stairs x 2 with Min A and heavy B UE assist. Pt instructed in and performed TUG (see below). VSS throughout on RA.  Barriers to return to prior living situation: Medical status, functional mobility  Recommendations for discharge: TCU   Rationale for recommendations: TCU if discharged today.Pending LOS pt may be appropriate to return home with HHPT/OT in order to progress functional mobility and activity tolerance.      Timed Up and Go (TUG): TUG is a test of basic mobility skills. It is used to screen individuals prone to falls.  Gait assistive device used: 4WW     Patient score 23.3 seconds  ?13.5 seconds indicate at risk for falls in older adults according to Jovany et al 2000.  ?30 seconds - indicates dependency in most ADL and mobility skills according to Posiakjo & Jimenez 1991  >11.5 seconds indicate at risk for falls in adults with Parkinson's Disease    Minimal Detectable Change for patients with Alzheimer s = 4.09 sec   Minimal Detectable Change for patients with Parkinson s Disease = 3.5 sec   according to Vu-River & Phill- Mason 2011    Assessment: Pt presents with history of falls and instability with functional mobility tasks. Pt educated on performance of TUG prior to testing. Pt given 2 attempts to perform the test as quickly as possible to determine risks of falls.   (8 minutes)        Entered by: Gage Page 08/19/2020 1:10 PM

## 2020-08-19 NOTE — PROGRESS NOTES
Care Coordinator Progress Note    Admission Date/Time:  8/17/2020  Attending MD:  Sarina Mixon*    Data  Chart reviewed, discussed with interdisciplinary team.   Patient was admitted for:    Generalized weakness  Fall, initial encounter  Abrasion  Hypokalemia  Hypomagnesemia  Leukocytosis, unspecified type  Elevated brain natriuretic peptide (BNP) level  Elevated CK  Abrasion of left elbow, initial encounter  Fall from bed, initial encounter  Personal history of fall  Hypopotassemia  Encntr for obs for susp expsr to oth biolg agents ruled out.    Concerns with insurance coverage for discharge needs: None.  Current Living Situation: Patient lives alone.  Support System: Supportive and Involved  Services Involved: Home Care  Transportation at Discharge: Family or friend will provide  Transportation to Medical Appointments:   - Name of caregiver: Chandan  Barriers to Discharge: Medical stability.     Assessment  Patient is a 75yr old woman with a prior medical history of CAD s/p PCI w/ADOLFO x2, remote breast cancer, esophageal stricture, who was admitted w/weakness, resulting in an atraumatic fall. Per discussion with the unit SW, patient has TCU recommendations from PT and OT, and she currently declines placement. Patient lives independently in a senior apartment facility. Patient confirms that she has home care w/[facility], RN/PT/OT services, resumption order placed at this time. [facility] contacted and updated on admission and potential discharge plan. Patient confirms that she has all necessary DME at this time. Patient voiced no further concerns regarding discharge planning, family will assist with discharge transportation.     [facility]  Ph: [phone]  Fax: [phone]    [facility] fax: [phone]     Plan  Anticipated Discharge Date:  8/19 vs 8/20?  Anticipated Discharge Plan:  Home w/home care services.     Cynthia Rod, RNCC, BSN    [place]  [facility]  [address] [place] [address]    [email or web address]    Office: [phone] Pager: [phone]  To contact the [facility] RNCC, page [phone].

## 2020-08-19 NOTE — PLAN OF CARE
Discharge Planner OT   Patient plan for discharge: Home   Current status: OT eval completed, treatment indicated. Educated pt on OT role during IP stay. Therapist administered MoCA (Version 8.2) to assess cog skills for IND living. Pt scored 22/30 (26+normative range) w/MIS score of 13/15. This is a decrease from last MoCA score of 26/30 administered on 8/2/2020. Pt demo'd difficulty w/ executive function, conceptual thinking, and attention. Reviewed results and implications w/ADLs and IADLs.    Barriers to return to prior living situation: medical status, weakness, cognition, lives alone   Recommendations for discharge: TCU  Rationale for recommendations:  If pt were to discharge to day, recommend TCU. Anticipate with 1-3 more admission days, pt will likely be able to return home with HHPT/OT. Pt to benefit from skilled OT to maximize IND in ADLs/IADLs since pt lives alone in facility.        Entered by: Loren Abraham 08/19/2020 8:37 AM

## 2020-08-19 NOTE — PLAN OF CARE
Pt arrived to  around 2030.    Reason for transfer: Lower lvl of care.  Transferred from: 6B  Report received from: Freya Goncalves RN skin assessment completed by: Avery Alonzo algorithm reevaluated: Yes  Was Pulsate ordered?: No  Belongings: See summary of care note      Events on 5B  -c/o headache, admin tylenol x 1  -admin bedtime meds  -admin PRN melatonin

## 2020-08-19 NOTE — PLAN OF CARE
"ASSUMED CARES: 4018-9809  STATUS: Pt admitted 8/17 for FTT and fall at home. ETOH abuse. MS.   NEURO: A/o x 4- Pt had an episode of confusion upon waking up during the night- Pt able to orient quickly. L pupil dilated 5mm- Pt states has been like this for \"Years\" d/t MS. R pupil 3mm.   VS: VSS on RA. Slightly elevated BP this AM (140/60)'s.   ACTIVITY: Up with SBA and walker.   PAIN: C/o headache- PRN APAP given x 1   CARDIAC: Tachy at times. CAD.   RESP: OSORIO.   GI/: Voiding spontaneously- Reanna colored urine. No BM overnight   DIET: Regular  SKIN: + Bruises + Scabs from falls at home.   LDA'S: L PIV infusing LR 75 ml/hr.   LABS: Hgb 9.5. K+ 3.7. Mg 2.0 Creat 0.84. AST 83. ALT 41.   CHANGES THIS SHIFT: Pt able to sleep in between cares- slight confusion/disorientation upon waking.   POC: Cont with POC. Plan for discharge home once pt can safely ambulate. Bed alarm on for pt safety. Call light within reach. Will continue to monitor.       Addendum:  x 2 came to draw AM labs and were unsuccessful- Per - Will be calling vascular to come and obtain labs- Will inform oncoming nurse.   "

## 2020-08-19 NOTE — PROGRESS NOTES
"   08/19/20 0800   Quick Adds   Type of Visit Initial Occupational Therapy Evaluation   Living Environment   Lives With alone   Living Arrangements independent living facility   Home Accessibility no concerns   Transportation Anticipated car, drives self   Living Environment Comment Pt lives in [address]+ building alone. Pt reports IND w/driving but not at night 2/2 vision. Pt uses rolling walker, cane, showerchair in walk-in shower.    Self-Care   Usual Activity Tolerance moderate   Current Activity Tolerance fair   Regular Exercise No   Equipment Currently Used at Home cane, straight;grab bar, toilet;grab bar, tub/shower;shower chair;walker, rolling   Activity/Exercise/Self-Care Comment Pt reports IND w/ADLs, uses delivery service for groceries, IND w/medication   Functional Level   Ambulation 1-->assistive equipment   Transferring 1-->assistive equipment   Toileting 0-->independent   Bathing 1-->assistive equipment   Dressing 0-->independent   Eating 0-->independent   Communication 0-->understands/communicates without difficulty   Swallowing 0-->swallows foods/liquids without difficulty   Cognition 2 - difficulty with organizing thoughts  (Pt reports mild cog changes 2/2 covid isolation )   Fall history within last six months yes   Number of times patient has fallen within last six months 2   Which of the above functional risks had a recent onset or change? ambulation;cognition;fall history   Prior Functional Level Comment Pt mod-I for functional mobility w/4WW. Reports 2 falls 2/2 knees buckling and slipping off bed at night.    General Information   Onset of Illness/Injury or Date of Surgery - Date 08/17/20   Referring Physician Naldo Carroll MD   Patient/Family Goals Statement \"To get stronger and be more confident walking\"   Additional Occupational Profile Info/Pertinent History of Current Problem Per H&P: \"75 year old female admitted on 8/1/2020. She has a history of breast cancer, CAD s/p PCI (4/2018), PAF, CHF, " "HTN, HLD, MS, fibromyalgia, IBS, GERD, esophageal perforation, and malnutrition who presents with progressive generalized weakness x 3-4 weeks, culminating in fall at home today without significant injury.  Noted to be hypokalemic on evaluation (K 2.5).\"   Precautions/Limitations fall precautions   Weight-Bearing Status - LUE full weight-bearing   Weight-Bearing Status - RUE full weight-bearing   Weight-Bearing Status - LLE full weight-bearing   Weight-Bearing Status - RLE full weight-bearing   Heart Disease Risk Factors Age;Medical history;Lack of physical activity   General Observations Pt long-sitting in bed throughout EVAL, pleasant and agreeable to therapy.    General Info Comments Activity: Up with assist    Cognitive Status Examination   Orientation person;place   Cognitive Comment Pt reports mild cog changes 2/2 covid isolation, unable to pinpoint area of deficit.    Visual Perception   Visual Perception Wears glasses  (readers and distance)   Visual Perception Comments Pt reports needing cataract surgery but unable to 2/2 hospital stay.    Sensory Examination   Sensory Quick Adds No deficits were identified   Pain Assessment   Patient Currently in Pain No   Integumentary/Edema   Integumentary/Edema no deficits were identifed   Posture   Posture forward head position   Range of Motion (ROM)   ROM Comment BUE WFL   Strength   Strength Comments BUE grossly 3+/5   Hand Strength   Hand Strength Comments 4+/5, WFL for ADLs   Instrumental Activities of Daily Living (IADL)   Previous Responsibilities meal prep;housekeeping;laundry;medication management;driving   IADL Comments Pt reports delivery service for grocery shopping    Activities of Daily Living Analysis   Impairments Contributing to Impaired Activities of Daily Living cognition impaired;balance impaired;strength decreased   General Therapy Interventions   Planned Therapy Interventions ADL retraining;IADL retraining;strengthening;progressive " "activity/exercise;home program guidelines;cognition   Clinical Impression   Criteria for Skilled Therapeutic Interventions Met yes, treatment indicated   OT Diagnosis Impaired ADL/IADL independence   Influenced by the following impairments fatigue, generalized weakness, mild cognitive impairment   Assessment of Occupational Performance 3-5 Performance Deficits   Identified Performance Deficits bathing, meal prep, housekeeping, driving   Clinical Decision Making (Complexity) Low complexity   Therapy Frequency 6x/week   Predicted Duration of Therapy Intervention (days/wks) 1 week   Anticipated Equipment Needs at Discharge   (TBD)   Anticipated Discharge Disposition Home with Home Therapy   Risks and Benefits of Treatment have been explained. Yes   Patient, Family & other staff in agreement with plan of care Yes   Clinical Impression Comments Pt will benefit from skilled OT to address above deficits to maximize IND in ADLs/IADLs    [facility] AM-PAC  \"6 Clicks\" Daily Activity Inpatient Short Form   1. Putting on and taking off regular lower body clothing? 4 - None   2. Bathing (including washing, rinsing, drying)? 3 - A Little   3. Toileting, which includes using toilet, bedpan or urinal? 4 - None   4. Putting on and taking off regular upper body clothing? 4 - None   5. Taking care of personal grooming such as brushing teeth? 4 - None   6. Eating meals? 4 - None   Daily Activity Raw Score (Score out of 24.Lower scores equate to lower levels of function) 23   Total Evaluation Time   Total Evaluation Time (Minutes) 4     "

## 2020-08-19 NOTE — PROGRESS NOTES
"Social Work: Assessment with Discharge Plan    Patient Name:  Minerva Blanco  :  1945  Age:  75 year old  MRN:  [id number]  Risk/Complexity Score:     Completed assessment with:  Patient     Presenting Information   Reason for Referral:  Discharge plan  Date of Intake:  2020  Referral Source:  Chart Review  Decision Maker:  Patient   Alternate Decision Maker:  Hieu ZARAGOZA, daughter Rufina Sosa [phone]  Health Care Directive:  Copy in Chart  Living Situation:  Apartment  Previous Functional Status:  Independent  Patient and family understanding of hospitalization:  Pt has appropriate understanding of hospitalization.   Cultural/Language/Spiritual Considerations:  Pt is a 75 year old  female pt.   Adjustment to Illness:  Pt appears to be adjusting well. Pt reports she would like the medical team \"to do everything they need to do to make sure she can go home safely.\"    Physical Health  Reason for Admission:    1. Generalized weakness    2. Fall, initial encounter    3. Abrasion    4. Hypokalemia    5. Hypomagnesemia    6. Leukocytosis, unspecified type    7. Elevated brain natriuretic peptide (BNP) level    8. Elevated CK    9. Abrasion of left elbow, initial encounter    10. Fall from bed, initial encounter    11. Personal history of fall    12. Hypopotassemia    13. Encntr for obs for susp expsr to oth biolg agents ruled out      Services Needed/Recommended:  TCU    Mental Health/Chemical Dependency  Diagnosis:  Pt does reports some feelings of depression due to COVID and feeling isolated in her apt. Pt reports MD placed her on an antidepressant yesterday. Per chart review, pt's daughter has some concerns related to her alcohol abuse. Pt denied any concerns.   Support/Services in Place:  Per pt and chart review, none.  Services Needed/Recommended:  NA    Support System  Significant relationship at present time:  Pt's daughter Chandan ([place]) and 2 male grandchildren.   Family of " origin is available for support:  Yes, Chandan and daughter Coleen ([place], [place]).   Other support available:  Friends   Gaps in support system:  Pt does not have 24 hour care.   Patient is caregiver to:  None     Provider Information   Primary Care Physician:  Andrea Nino   [phone]   Clinic:  [facility] [address] [address] / [facility] [address]      :  None    Financial   Income Source:  Social security, penitentiary  Financial Concerns:  Pt denied any concerns   Insurance:    Payor/Plan Subscriber Name Rel Member # Group #   MEDICARE - MEDICARE FAVIAN MALONE [facility] [id number]       ATTN CLAIMS, [address]   BCBS - [place] FAVIAN MALONE  [id number]      [address] [address]       Discharge Plan   Patient and family discharge goal:  Home with home health care  Provided education on discharge plan:  YES  Patient agreeable to discharge plan:  NO  A list of Medicare Certified Facilities was provided to the patient and/or family to encourage patient choice. Patient's choices for facility are:  NA  Will NH provide Skilled rehabilitation or complex medical:  NA  General information regarding anticipated insurance coverage and possible out of pocket cost was discussed. Patient and patient's family are aware patient may incur the cost of transportation to the facility, pending insurance payment: NA  Barriers to discharge:  Medical stability, safe discharge plan.     Discharge Recommendations   Anticipated Disposition:  Home with services  Transportation Needs: TBD    Additional comments   SW reviewed pt's chart, current recommendations are TCU. SHAHRAM met with pt at bedside to introduce self and role on treatment team. SHAHRAM discussed with pt TCU recommendations, pt declines stating that she prefers to return home with the home health care she currently has ([facility] PT, OT, RN.) Pt stated she plans on getting a life alert at time of discharge. Pt uses a 4WW at baseline.  Pt  reports good family support from her daughter Chandan and 2 adult grandchildren - who assist her as needed.  Pt reports she is still driving, however drives rarely and gets her groceries delivered to her via [employer].     SW to remain available for discharge planning needs. SW updated unit RNCC on pt's desire to return home at time of discharge.    KOTA Gabriel, [facility]  Adult Acute Care Float   5B ph: [phone]  Pgr: [phone]

## 2020-08-19 NOTE — SUMMARY OF CARE
Belongings on arrival to .    -phone  -  -purse with 20 dollars cash, debit card  -slippers  -pants  -shirt  -jacket

## 2020-08-19 NOTE — DOWNTIME EVENT NOTE
The EMR was down for 2.5 hours on 8/19/2020.    Lynn was responsible for completing the paper charting during this time period.     The following information was re-entered into the system by Coleen Solo RN: Flowsheet data    The following information will remain in the paper chart: FLAKITA Solo RN  8/19/2020

## 2020-08-19 NOTE — PLAN OF CARE
Assumed cares: 4474-5770  Neuro: A&Ox4  VS: VSS on RA.   GI/: Strict I/O w/ lasix. Voiding adequately. Last BM loose. PRN imodium given.   IV: PIV infusing LR @ 75mL/hr.   Nutrition: Poor appetite.   Skin: CDI except bruising and scabs.   Pain: Pt denies pain  Activity: Pt up SBA w/ walker. PT/OT session complete. Ambulated in [place] x1.     Plan: Continue to ambulate and address malnutrition before discharge.

## 2020-08-20 PROBLEM — R52 PAIN: Status: ACTIVE | Noted: 2020-01-01

## 2020-08-20 NOTE — PROVIDER NOTIFICATION
5B [address]  Mima 3  Lynn RN [phone]  Pt requesting Trazodone for sleep. Can this be ordered? Thanks     Mima VILLEGAS text paged at [phone]. Will continue to monitor.     Addendum: NELLY LOVELACE called writer back and stated she didn't feel comfortable ordering Trazodone  d/t possible poly pharm and ETOH abuse- Placed orders for extra 3mg Melatonin dose- Will continue to monitor.

## 2020-08-20 NOTE — PLAN OF CARE
Assumed cares 1500 to 2300.     /65 (BP Location: Left arm)   Pulse 96   Temp 97.7  F (36.5  C) (Oral)   Resp 18   Ht 1.524 m (5')   Wt 45.2 kg (99 lb 9.6 oz)   SpO2 99%   BMI 19.45 kg/m       Pain: Reported in L arm from IV and multiple pokes from lab and vascular, admin tylenol x 1.  Neuro: A and O x 4.   Respiratory: Lung sounds clear and equal on RA.  Cardiac/Neurovascular: HR and pulses WDL. Mild to moderate LE edema.  GI/: Bowel sounds active. Adequate UOP.  Nutrition: Poor intake. Continue to encourage.  Activity: Up SBA, BA activated.  Skin: Blanchable redness on bony prominences.   Lines: Midline in LUE placed by vascular, site WDL.   Events this shift: Phos 2.3, replacing now, recheck ordered for tomorrow.     Plan: Cont to monitor. Potential discharge tomorrow?    Sheree Us RN on 8/19/2020 at 9:29 PM

## 2020-08-20 NOTE — PROGRESS NOTES
Resident/Fellow Attestation   I, Zaira Caruso, was present with the medical student who participated in the service and in the documentation of the note.  I have verified the history and personally performed the physical exam and medical decision making.  I agree with the assessment and plan of care as documented in the note.         Zaira Caruso MD  Medicine-Pediatrics PGY4  Pager # [phone]      [facility], [place]    Progress Note - [place] 3 Service        Date of Admission:  8/17/2020    Assessment & Plan   Minerva Blanco is a 75 year old woman with past medical history significant for CAD s/p PCI with ADOLFO x2 4/2018, remote breast cancer, esophageal stricture, and recent hospitalization for weakness and fall with negative workup, admitted on 8/17 again for weakness resulting in an atraumatic fall. Similar to the previous hospitalization, on admission, she was noted to be hypokalemic, hypoglycemic, malnourished, and had elevated LFTs.    Failure to thrive   Weakness with hx of falls    Ongoing for several weeks, cause is likely multifactorial. Presented with hypokalemia, hypoglycemia, and soft blood pressures after falling from bed and spending the night on the floor. Electrolytes and blood sugars have improved this hospitalization with PO intake and she has become stronger with physical therapy. Etiology likely secondary to poor nutrition and medication non-compliance with possible component of alcohol use, polypharmacy, electrolyte abnormalities, and depression. MOCA 8/19 showed 22/30, down from 26/30 at previous hospitalization.  - electrolyte protocol, as below   - Nutrition consult  - PT consult, recommending okay to discharge to home if she stays a few days to regain strength, otherwise to TCU  - Mirtazapine for depression, insomnia, and appetite   - Discontinued doxepin, benadryl, and tizanidine    Dilated common bile duct  Obstructive liver  labs  Elevated LFTs  Steatosis   Obstructive labs returned to high baseline after fluid boluses. CT chest ab pelvis showed stable steatatosis and CBD dilation but no masses. INR 1.29, LFTs slightly elevated AST>ALT. See below for alcohol use. GI note from 8/5/20 [place] and discharge summary post MRCP from [facility] 6/11/2019 recommends no need for GI outpatient follow up.   - Advised to discontinue alcohol use  - Advised to discontinue tizanidine, some evidence to show that it can cause liver disease, especially in people using alcohol     Alcohol use  Minerva reports 1 glass of wine per night, daughter says that she spent more on alcohol in the last month than on food. Clinical picture fits use (AST:ALT elevation, chronic malnutrition/failure to thrive, macrocytic anemia, hallucinations).  - Phosphatidylethanol level pending  - counseled pt against alcohol use     Malnutrition  Low oral intake  Underlying  psychosocial vs malignancy vs esophageal stricture vs alcohol use. Appetite improving with mirtazapine.   - RD following, appreciate recs     Macrocytic anemia  Low of 9.5 with  on 8/18. Vitamin B12 and folate wnl, thiamine two weeks ago wnl. Reports 1 alcoholic drink/day, daughter reports significantly more. Blood smear showed slight normochromic, macrocytic anemia; no increase in red cell regeneration.   - Encourage good nutrition and to abstain from alcohol      Esophageal stricture  Dysphagia  2/2 Nissen fundoplication in 1/2016 for hiatal hernia that resulted in 3 years needing a feeding tube. Esophagram at prior hospitalization revealed small diverticulum but no need for follow up. Likely contributing to this and prior hospitalization.   - PTA omeprazole 20 mg    Hypomagnesia  1.3 8/20  -Continue replacement protocol     LYUBOV, resolved  Cr 0.56 on admission, 0.84 next morning and hyaline casts on UA concerning for prerenal. S/p 500 mL bolus x2, now. Low urine output in chart not accurate,  nurse reports unmeasured urine with stool, did appear dark yellow at first, but lightening in color afternoon 8/18.  - IVF 75 mL/hr  - Monitor urine output     Hypotension, resolved  SBP in 70s overnight 8/17-18, resolved with fluid bolus. Orthostatics 8/18 showed 116/69 supine to 99/62 standing. Concerning for adrenal insufficiency, doesn't fit with hypokalemia.   - IVF as above  - Cortisol 8/18 AM     Hallucinations, resolved  Overnight 8/17-18 had hallucinations of flowers on walls and cat in corner of room. Has experienced in past with doxepin 4 mg, on 2 mg overnight. Hospital delirium most likely. Chronic dementia unlikely given no history of unprovoked hallucinations.   - Discontinued doxepin    CAD s/p ADOLFO 2018   - ASA 81 mg PTA  - PTA metoprolol 12.5 PO    Hypoglycemia, resolved   On admission, BG noted to be 51 and responded to PO intake + D5 bolus 8/17. Likely 2/2 insufficient caloric intake and alcohol use.   - Hypoglycemia protocol     Hypokalemia, resolved  Up to 3.7 8/18 up from 2.5 on admission. Bicarb 29 8/18 down from 32 on admission. Likely from poor PO intake + home lasix and missing home potassium supplement.  - PTA lasix and potassium supplement      Hypophosphatemia, resolved   2.3 8/19  - On replacement protocol     Leukocytosis, resolved   WBC 16.3 on admission, resolved to 6.4 with fluids next day.     Hypocalcemia, resolved  Resolved with repletion.     Headache, resolved  R frontal headache on admission, resolved 8/18.     Social   Daughter is Chandan [phone]       Diet: Regular adult diet    Fluids: PO  Lines: L PIV  DVT Prophylaxis: Low Risk/Ambulatory with no VTE prophylaxis indicated  Whitt Catheter: not present  Code Status: DNR/DNI         Disposition Plan   Expected discharge: tomorrow, recommended preferrably to TCU per OT, but she prefers to go home.   Entered: Sapphire Rodriguez 08/20/2020, 6:53 PM       The patient's care was discussed with the Attending Physician, Dr. Swann  Chelsea.     Sapphire Rodriguez, MS4  [facility] 3 Service  [facility], [place]  Pager: [phone]  Please see sticky note for cross cover information    Pt was seen and examined by me on morning rounds; reviewed labs, vitals, imaging with the team.  I agree with the detailed A/P documentation above.  Taking better po, getting stronger each day.  Likely discharge home tomorrow with increased home services.     Sarina Swann MD  ______________________________________________________________________    Interval History   Appetite continues to be good, having some abdominal discomfort with eating.    Data reviewed today: I reviewed all medications, new labs and imaging results over the last 24 hours. I personally reviewed no images or EKG's today.    Physical Exam   Vital Signs: Temp: 98  F (36.7  C) Temp src: Oral BP: 127/68 Pulse: 92   Resp: 18 SpO2: 98 % O2 Device: None (Room air)    Weight: 98 lbs 11.2 oz  General: Pale, frail-appearing, conversant  Neruo: R pupil larger than L, extraocular eye movements intact, CN III - XII grossly intact  HEENT: Atraumatic, normocephalic  CV: Normal rate and rhythm, no murmurs or rubs  Resp: Good air movement, clear to auscultation with no crackles or wheezes  Abdomen: Normal bowel sounds, pain on palpation of RUQ  Ext: 2+ pitting edema to mid-calves    Data   Recent Labs   Lab 08/20/20  1106 08/20/20  0920 08/19/20  1350  08/17/20  1159   WBC 5.7 Canceled, Test credited 7.3   < > 16.3*   HGB 10.6* Canceled, Test credited 10.3*   < > 12.4   * Canceled, Test credited 121*   < > 118*    Canceled, Test credited 264   < > 367   INR  --   --  1.29*  --   --     Canceled, Test credited 139   < > 141   POTASSIUM 3.4 Canceled, Test credited 3.5  3.6   < > 2.5*   CHLORIDE 104 Canceled, Test credited 103   < > 98   CO2 30 Canceled, Test credited 25   < > 30   BUN 6* Canceled, Test credited 6*   < > 6*   CR 0.58 Canceled, Test credited 0.59   < > 0.56    ANIONGAP 7 Canceled, Test credited 10   < > 13   ANNABELLE 7.4* Canceled, Test credited 7.9*   < > 8.1*   GLC 92 Canceled, Test credited 88   < > 51*   ALBUMIN 2.1* Canceled, Test credited 2.0*   < > 2.6*   PROTTOTAL 5.7* Canceled, Test credited 5.6*   < > 7.3   BILITOTAL 1.2 Canceled, Test credited 1.5*   < > 2.6*   ALKPHOS 378* Canceled, Test credited 376*   < > 472*   ALT 48 Canceled, Test credited 48   < > 59*   AST 73* Canceled, Test credited 80*   < > 108*   TROPI  --   --   --   --  <0.015    < > = values in this interval not displayed.

## 2020-08-20 NOTE — PROGRESS NOTES
[id number]    VSS. Afebrile. 4 grams of magnesium infusing. Needs magnesium re-check at 2030 this evening. Tylenol given for headache. Up at edge of bed eating dinner. Bed alarm for safety. Continue to monitor.

## 2020-08-20 NOTE — PLAN OF CARE
OT 5B: Discharge Planner OT   Patient plan for discharge: Pt declining TCU; agreeable to discharge home with home care services  Current status: Pt is progressing overall activity tolerance.  SBA with FWW for transfers and ambulation of household distances, tolerates 6 minutes dynamic standing activity without LOB, Min A for LE dressing.  Notable BLE edema. Issued neck and arm strengthening HEP and pt receptive to education  Barriers to return to prior living situation: deconditioning, generalized weakness, acute medical needs, pt lives alone  Recommendations for discharge: while pt is deconditioned and may benefit from short TCU stay, pt is declining.  If pt discharges to home, recommend home OT/PT  Rationale for recommendations: Pt will benefit from continued skilled therapy intervention to assist with strength, conditioning and independence with ADL/IADLs.           Entered by: Anel Saldana 08/20/2020 3:54 PM

## 2020-08-20 NOTE — PLAN OF CARE
"ASSUMED CARES: [id number]  STATUS: Pt admitted 8/17 for FTT and fall at home. ETOH abuse. MS.   NEURO: A/o x 4- Pt woke up in the middle of the night d/t a nightmare- Pt expressed feeling that her \"heart was racing and couldn't catch my breath\"- VSS- Pt relaxed and felt better after orienting to where she was.  L pupil dilated 5mm- Pt states has been like this for \"Years\" d/t MS. R pupil 3mm.   VS: VSS on RA.   ACTIVITY: Up with SBA and walker.   PAIN: C/o some mild pain in LUE d/t multiple lab \"Pokes\" yesterday d/t inability to obtain sample- Declined any intervention.   CARDIAC: BLE edema.    RESP: No C/o SOB.   GI/: Voiding spontaneously- Reanna colored urine- monitoring OP d/t restart of Lasix yesterday. No BM overnight   DIET: Regular  SKIN: + Bruises + Scabs all over body from falls at home.   LDA'S: L midline infusing LR 75 ml/hr.   LABS: Hgb 10.3 K+ 3.5. Mg 2.0 Creat 0.59. AST 80. ALT 48. Phos 2.3- Finished replacement during night- recheck in AM.   CHANGES THIS SHIFT: Pt woke up in the middle pf the night d/t a nightmare- Pt wondering if d/t Remeron? Pt expressed increased appetite and ate an ice cream upon arrival to shift. Calling appropriately for assistance to bathroom. Phos replaced recheck in AM.    POC: Cont with POC. Plan for discharge home vs TCU pending placement- possibly today? Bed alarm on for pt safety. Call light within reach. Will continue to monitor.         "

## 2020-08-20 NOTE — PLAN OF CARE
Assumed cares: [phone]  Neuro: A&Ox4  VS: VSS on RA.   GI/: Strict I/O w/ lasix. Voiding adequately. Last BM loose. PRN simethicone and imodium given.   IV: PIV saline locked. Fluids discontinued. Mag needs to be replaced.   Nutrition: Poor appetite.   Skin: CDI except bruising and scabs.   Pain: Pt c/o headache.   Activity: Pt up SBA w/ walker. PT/OT session complete. Ambulated in [place] x1.      Plan: Continue to ambulate and address malnutrition before discharge. Potential discharge tomorrow.

## 2020-08-21 NOTE — PROGRESS NOTES
Discharge orders, medications, follow-up appointments reviewed with patient and signed. Midline IV removed. Ms. Bravo denies pain and her vitals are stable. Eating well and verbalizes importance of adequate nutritional intake, abstaining from alcohol. Left to discharge [facility] at 1115 and then home with family member providing ride home.

## 2020-08-21 NOTE — PLAN OF CARE
5B    Discharge Planner PT   Patient plan for discharge: Home with Assist + HHPT/OT  Current status: Pt completed sit<>stand to 4WW with SBA. Pt ambulated ~300' with 4WW and CGA-SBA, no overt LOB identified. Pt moderately fatigued and SOB following gait activity, SPO2 100% on RA. Reviewed LE exercises issued last POC, pt verbalized understanding. Pt denied any additional mobility concerns prior to discharge.   Barriers to return to prior living situation: Medical status, lack of assistance  Recommendations for discharge: Pt would benefit from TCU stay, pt declining. Home with Assist + HHPT/OT  Rationale for recommendations: Pt below baseline level of function and requires additional therapy services to improve activity tolerance and functional mobility.        Entered by: Gage Page 08/21/2020 10:27 AM        Physical Therapy Discharge Summary    Reason for therapy discharge:    Discharged to home with home therapy.    Progress towards therapy goal(s). See goals on Care Plan in [facility] electronic health record for goal details.  Goals partially met.  Barriers to achieving goals:   discharge from facility.    Therapy recommendation(s):    Continued therapy is recommended.  Rationale/Recommendations:  to improve safety with IND mobility, functional strength and activity tolerance.

## 2020-08-21 NOTE — PLAN OF CARE
Occupational Therapy Discharge Summary    Reason for therapy discharge:    Discharged to home with home therapy.    Progress towards therapy goal(s). See goals on Care Plan in [facility] electronic health record for goal details.  Goals partially met.  Barriers to achieving goals:   discharge from facility.    Therapy recommendation(s):    Continued therapy is recommended.  Rationale/Recommendations:   OT/PT recommended to progress safety and IND with ADL/IADL and functional mobility.

## 2020-08-21 NOTE — PLAN OF CARE
Discharge Planner OT   Patient plan for discharge: home with A and HH OT/PT  Current status: Pt CGA with FWW sit <> stand. Pt completing full body dressing; mod A donning socks, min A donning/doffing brief with use of reacher and mod I upper body dressing with set up. Pt encouraged to purchase sock aid to promote IND with LB dressing, pt plans to use. Pt educated on WS/EC, reviewed UE HEP.   Barriers to return to prior living situation: deconditioning, lives alone  Recommendations for discharge: TCU, however pt declining - thus home with HH OT/PT   Rationale for recommendations: Pt lives alone and is below baseline and would benefit from continued skilled therapy to increase activity tolerance and independence with ADLs.         Entered by: Alana Azul 08/21/2020 10:09 AM

## 2020-08-21 NOTE — PLAN OF CARE
RN assumed cares [phone]    Reason for admission: FTT, falls, malnourished  Significant Med Hx: CAD s/p PCI, breast cancer, esophageal stricture     Status: No acute events. Pt requested additional dose of melatonin to help sleep, appeared to sleep well throughout the night.   VS: VSS, see flowsheets.   Respiratory: Breathing adequately on RA.   Pain: Denies.   Neuro: WDL, A/o x4.   Skin: BLE pitting edema. Edmar/red   Calves/shins. Scab on RLE shin. Reddened bony prominence on mid back, mepilex applied.   IV/Drains: Midline heparin locked.   GI/: WDL.   Diet: Regular. Denies nausea, some snack overnight.   Activity: Up w/ SBA & walker. BA on for safety overnight.   Labs: Mg+ replaced, recheck OK @ 3.1.     Recommendations/POC: Likely discharge to home today. Continue to monitor and follow POC.       Problem: Adult Inpatient Plan of Care  Goal: Plan of Care Review  8/21/2020 0503 by Elida Beaulieu RN  Outcome: Improving  8/20/2020 1519 by Yoana Lizama RN  Outcome: No Change  Goal: Patient-Specific Goal (Individualization)  8/21/2020 0503 by Elida Beaulieu RN  Outcome: Improving  8/20/2020 1519 by Yoana Lizama RN  Outcome: No Change  Goal: Absence of Hospital-Acquired Illness or Injury  8/21/2020 0503 by Elida Beaulieu RN  Outcome: Improving  8/20/2020 1519 by Yoana Lizama RN  Outcome: No Change  Goal: Optimal Comfort and Wellbeing  8/21/2020 0503 by Elida Beaulieu RN  Outcome: Improving  8/20/2020 1519 by Yoana Lizama RN  Outcome: No Change  Goal: Readiness for Transition of Care  8/21/2020 0503 by Elida Beaulieu RN  Outcome: Improving  8/20/2020 1519 by Yoana Lizama RN  Outcome: No Change  Goal: Rounds/Family Conference  8/21/2020 0503 by Elida Beaulieu RN  Outcome: Improving  8/20/2020 1519 by Siekmeier, Yoana P, RN  Outcome: No Change     Problem: Fall Injury Risk  Goal: Absence of Fall and Fall-Related Injury  8/21/2020 0503 by  Elida Beaulieu, RN  Outcome: Improving  8/20/2020 1519 by Yoana Lizama, RN  Outcome: No Change     Problem: OT General Care Plan  Goal: Toilet Transfer/Toileting (OT)  Description: Toilet Transfer/Toileting (OT)  Outcome: Improving  Goal: Cognitive (OT)  Description: Cognitive (OT)  Outcome: Improving

## 2020-08-21 NOTE — DISCHARGE SUMMARY
[facility], [place]  Discharge Summary - Medicine & Pediatrics       Date of Admission:  8/17/2020  Date of Discharge:  8/21/2020 11:57 AM  Discharging Provider: Sarina Swann   Discharge Service: [facility] 3    Discharge Diagnoses   Failure to thrive   Weakness with hx of falls    Concern for Alcohol use  Malnutrition  Macrocytic anemia  Leukocytosis  LYUBOV  Hypotension  Hallucinations  Hypoglycemia   Hypokalemia  Hypomagnesia  Hypophosphatemia    Hypocalcemia    Follow-ups Needed After Discharge   Follow-up Appointments     Adult [facility] Follow-up and recommended labs and tests      Follow up with primary care provider, No primary care provider on file.,   within 7 days for hospital follow- up.  No follow up labs or test are   needed.      Appointments on [place] and/or [facility] (with [facility] or [facility]   provider or service). Call [phone] if you haven't heard regarding   these appointments within 7 days of discharge.            Discontinued sedating medications tizanidine (replaced with robaxin) and doxepin. Discouraged the use of benadryl. Encourage to continue to take mirtazapine for depression/sleep/appetite and her potassium supplement.  Unresulted Labs Ordered in the Past 30 Days of this Admission     Date and Time Order Name Status Description    8/20/2020 1010 Potassium In process           Discharge Disposition   Discharged to home  Condition at discharge: Stable    Hospital Course   Minerva Blanco is a 75 year old woman with past medical history significant for coronary artery disease with two drug eluting stents place 4/2018, remote history of breast cancer, esophageal stricture, and recent hospitalization for weakness and fall with negative workup, admitted on 8/17 again for weakness resulting in an atraumatic fall. Similar to the previous hospitalization, on admission, she was noted to be hypokalemic, hypoglycemic, malnourished, and had elevated LFTs.    Failure  to thrive   Weakness with hx of falls    Minerva had been feeling increasingly weak for several weeks prior to this hospitalization and the underlying cause was thought to be multifactorial. She presented nitially with hypokalemia, hypoglycemia, and soft blood pressures after falling from bed and spending the night on the floor. MOCA 8/19 showed 22/30, down from 26/30 at previous hospitalization. Etiology was thought to be secondary to poor nutrition and medication non-compliance with possible component of alcohol use, polypharmacy, electrolyte abnormalities, and depression. Nutrition was consulted and electrolytes and blood sugars improved with increased oral intake and she became stronger with physical therapy. She reported not taking her potassium supplement and feeling lonely and isolated since COVID which she think contributed to her decreased appetite. She was admitted on several sedating medications, including doxepin, tizanidine, and lasix (if causing dehydration) in addition to taking over the counter benadryl, all of which were discontinued and robaxin was started as a muscle relaxant due to its decreased potential for sedating effects. Mirtazapine was started for depression, insomnia, and appetite stimulant. Minerva reported drinking one glass of wine with dinner nightly, but her daughter, Chandan, reported that she had looked at Minerva's bank account and saw that she had spent more on alcohol in the last month than on food. The clinical picture - loneliness/depression, steatosis with elevated LFTs AST>ALT, and macrocytic anemia - fit the classic presentation of chronic alcohol use. On discharge, Minerva was very agreeable to abstaining from alcohol. Malignancy was considered given obstructive liver labs but ruled out with normal blood smear and CT of the abdomen/chest/pelvis without masses. Cardiac etiology was ruled out given normal TTE from 8/2. Similarly, a normal ceruloplasmin ruled out Elian's  disease, a normal vitamin B12 and folate ruled out vitamin B deficiency, and her urine potassium:creatinine was normal. Physical and occupational therapy thought that Minerva would be benefit from transitional care, but Minerva preferred to be discharged directly to home.      Dilated common bile duct  Obstructive liver labs  Elevated LFTs  Steatosis   Minerva has history of increasing biliary labs including bilirubin and alk phos since last hospitalization as well as bile duct dilation noted on ultrasound and CT starting in 2017. Given that she had no abdominal pain, there was concern for chronic obstruction, possibly secondary to malignancy given her history of steatosis and breast cancer. An MRCP in June 2019 was negative for obstruction. The GI note from her most recent hospitalization on 8/5/20 and her discharge summary after her MRCP at [facility] on 6/11/2019 both  recommend no need for GI outpatient follow up. With fluid repletion, her obstructive liver labs returned to her (high) baseline. A CT of her chest, abdomen, and pelvis showed stable steatatosis and CBD dilation unchanged from prior  but no masses. We advised to discontinue alcohol use and to discontinue tizanidine given there is some evidence to show that it can cause liver disease, especially in people using alcohol.     Alcohol use  Minerva reports 1 glass of wine per night. Her daughter, Chandan, said that she spent more money on alcohol in the last month than on food in her bank statement and that she has found bottles of alcohol hidden around Minerva's house for years. She also reported that Minerva sounded drunk on the phone when she called her the morning of 8/17 before admission after falling off her bed and spending the night on the floor. However, Minerva's alcohol ethyl level was negative on arrival in the ED shortly after. Clinical picture fits use (AST>ALT elevation, chronic malnutrition/failure to thrive, macrocytic anemia,  hallucinations). We counseled Minerva that even if she is only drinking one glass of wine per night, it is likely still playing a role in her steatosis and functional decline resulting in increased hospital admissions. Phosphatidylethanol level was elevated at 449.    Esophageal stricture  Dysphagia  Minerva had a nissen fundoplication in 1/2016 for a hiatal hernia that resulted in 3 years needing a feeding tube. Esophagram at prior hospitalization revealed small diverticulum but no need for follow up. She reported feeling that food got stuck in her esophagus and the she often regurgitates foul-smelling, undigested food and that this makes her less interested in eating. Likely contributing to this and prior hospitalization. She was continued on PTA omeprazole 20 mg    Macrocytic anemia  Likely contributing to her weakness. Hemoglobin low of 9.5 with  on 8/18. Vitamin B12 and folate were normal on admission, thiamine from prior admission was normal. She reports 1 alcoholic drink per day, daughter reports significantly more. Her blood smear showed slight normochromic, macrocytic anemia; no increase in red cell regeneration. We encouraged good nutrition and to abstain from alcohol use.      Electrolyte abnormalities  Admitted with the electrolyte abnormalities below:  Hypokalemia: 2.5 on admission, similar to prior admission. Minerva reported not taking her home potassium supplement. Responded to replacement protocol, 3.2 on discharge.  Hypomagnesia Low of 1.3, responded to replacement protocol and increased PO intake. Level was normal at discharge.  Hypophosphatemia Low of 2.3, responded to replacement protocol and increased PO intake. Level was normal at discharge.  Pseudohypocalcemia 8.1 on admission corrected to 9.2 with albumin 2.6. responded to replacement protocol and increased PO intake. Level was normal at discharge.    Hypoglycemia, resolved   On admission, BG noted to be 51 and responded to PO  intake + D5 bolus 8/17. Likely was due to insufficient caloric intake and alcohol use.     LYUBOV, resolved  Cr 0.56 on admission, 0.84 next morning and hyaline casts on UA concerning for prerenal. Urine production picked up with IVF and increased PO intake. Cr 0.52 on discharge.     Hypotension, resolved  SBP in 70s overnight 8/17-18, resolved with fluid bolus. Orthostatics 8/18 showed 116/69 supine to 99/62 standing. Held lasix until resolved.     Hallucinations, resolved  Overnight 8/17-18 had hallucinations of flowers on walls and cat in corner of room. Has experienced in past with doxepin 4 mg, on 2 mg overnight. Hospital delirium most likely. Chronic dementia unlikely given no history of unprovoked hallucinations. Doxepin was discontinued thereafter.     Leukocytosis, resolved   WBC 16.3 on admission, resolved to 6.4 with fluids next day.     CAD s/p ADOLFO 2018  Continued PTA meds.    Consultations This Hospital Stay   MEDICATION HISTORY IP PHARMACY CONSULT  NUTRITION SERVICES ADULT IP CONSULT  PHYSICAL THERAPY ADULT IP CONSULT  VASCULAR ACCESS CARE ADULT IP CONSULT  OCCUPATIONAL THERAPY ADULT IP CONSULT  VASCULAR ACCESS CARE ADULT IP CONSULT    Code Status   Prior       The patient was discussed with Dr. Shree Rodriguez, MS4  [facility] [place]  Pager: [phone]    Pt was seen and examined by me on the day of discharge; I agree with the detailed f/u plans as documented above    Sarina Swann MD  ______________________________________________________________________    Physical Exam   Vital Signs:                    Weight: 98 lbs 11.2 oz  General: Pale, frail-appearing, conversant  Neruo: R pupil larger than L, extraocular eye movements intact, CN III - XII grossly intact  HEENT: Atraumatic, normocephalic  CV: Normal rate and rhythm, no murmurs or rubs  Resp: Good air movement, clear to auscultation, faint crackles in R lower lung inconsistent with no cough or SOB on  ROS  Abdomen: Normal bowel sounds, pain on palpation of RUQ  Ext: 1+ pitting edema to mid-calves  Psych: happy to be going home, conversant      Primary Care Physician   Physician No Ref-Primary    Discharge Orders      Home care nursing referral      Reason for your hospital stay    You were hospitalized after a fall at home and were found to have low potassium and low glucose.  It will be important for you to take all of your medications as prescribed especially your potassium supplementation and to eat at least 3 meals a day.  It was recommended that you go to a transitional care unit after leaving the hospital.  He should follow-up with your primary care physician within 1 to 2 weeks after discharge.  Please obtain a life alert in case you have falls in the future and cannot get up.  It is important for you to abstain from any alcohol and to stop taking sleep aids as these can increase your risk for falls and confusion.  Is okay for you to take your melatonin and mirtazapine at night.     Adult [facility] Follow-up and recommended labs and tests    Follow up with primary care provider, No primary care provider on file., within 7 days for hospital follow- up.  No follow up labs or test are needed.      Appointments on [place] and/or [facility] (with [facility] or [facility] provider or service). Call [phone] if you haven't heard regarding these appointments within 7 days of discharge.     Activity    Your activity upon discharge: activity as tolerated     When to contact your care team    Call your primary doctor if you have any of the following: weakness, lightheadedness, numbess/tingling, falls.     Diet    Follow this diet upon discharge: Orders Placed This Encounter      Combination Diet Regular Diet Adult       Significant Results and Procedures   Results for orders placed or performed during the hospital encounter of 08/17/20   XR Chest 2 Views    Narrative    Exam: XR CHEST 2 VW, 8/17/2020 12:28  PM    Indication: weak, fall    Comparison: 8/1/2020    Findings:   Extensive spinal fusion hardware in the thoracolumbar spine. Trachea  is midline. Heart size is normal. Atherosclerotic calcifications at  the aortic arch. Small left pleural effusion. No substantial right  pleural effusion. No pneumothorax. Partial left rib resection.  Surgical clips in the right axilla related to prior right lumpectomy.  Degenerative changes of the right shoulder. No focal consolidative  opacity.      Impression    Impression:   1. Small left pleural effusion. The lungs are otherwise relatively  clear. Right lumpectomy.    I have personally reviewed the examination and initial interpretation  and I agree with the findings.    LUCINDA VARGAS MD   CT Head w/o Contrast    Narrative    CT HEAD W/O CONTRAST 8/17/2020 12:27 PM    History: weakness, fall     Comparison: 8/1/2020 dated head CT    Technique: Using multidetector thin collimation helical acquisition  technique, axial, coronal and sagittal CT images from the skull base  to the vertex were obtained without intravenous contrast.    Findings: There is no intracranial hemorrhage, mass effect, or midline  shift. Gray/white matter differentiation in both cerebral hemispheres  is preserved. Ventricles are proportionate to the cerebral sulci. The  basal cisterns are clear. Mild diffuse cerebral volume loss. Scattered  periventricular hypodensities, consistent with chronic small vessel  ischemic disease given the patient's age.    The bony calvaria and the bones of the skull base are normal. The  visualized portions of the paranasal sinuses and mastoid air cells are  clear.       Impression    Impression:  No acute intracranial pathology.     BRITTANI VILLARREAL MD   Abdomen US, limited (RUQ only)    Narrative    EXAMINATION: Limited Abdominal Ultrasound, 8/17/2020 1:52 PM     COMPARISON: Ultrasound 8/3/2020.    HISTORY: Elevated liver labs.    FINDINGS:   Fluid: No evidence of  ascites or pleural effusions.    Liver: The liver demonstrates increased echogenicity and coarsened  echotexture, measuring 16 cm cm in craniocaudal dimension. There is no  focal mass.     Gallbladder: There is no wall thickening, pericholecystic fluid,  positive sonographic White's sign or evidence for cholelithiasis.  Small amount of layering echogenicity likely representing sludge.    Bile Ducts: Redemonstration of dilated tubular structure anterior to  the IVC consistent with common bile duct based on CT comparison from  11/15/2017. Currently measuring 14 mm. Measured 13 mm on 8/3/2020  ultrasound. Measured up to 16 mm on 11/15/2017 CT.     Pancreas: Obscured by overlying bowel gas.    Kidney: The right kidney measures 10.2 cm long. There is no  hydronephrosis or hydroureter, no shadowing renal calculi, cystic  lesion or mass.       Impression    IMPRESSION:   1.  Dilated common bile duct measuring 13 mm. No significant change in  diameter since 8/3/2020, somewhat improved since CT abdomen pelvis  dated 11/15/2017, when it measured up to 16 mm. If there is  significant and persistent clinical concern for choledocholithiasis or  other obstructing pathology at the level of distal bile duct, MRCP  could be obtained.  2.  No direct sonographic evidence of cholelithiasis or  choledocholithiasis. Small amount of biliary sludge.  3.  Hepatic steatosis.    I have personally reviewed the examination and initial interpretation  and I agree with the findings.    ALTAGRACIA BARBER MD   CT Chest/Abdomen/Pelvis w Contrast    Narrative    EXAMINATION: CT CHEST/ABDOMEN/PELVIS W CONTRAST, 8/18/2020 12:01 PM    TECHNIQUE: Helical CT images from the thoracic inlet through the  symphysis pubis were obtained with intravenous contrast. Contrast  dose: iopamidol (ISOVUE-370) solution 55 mL    COMPARISON: Ultrasound abdomen 8/17/2020    HISTORY: Neoplasm: extra-abdominal primary. Remote history of breast  cancer.    FINDINGS:    Lower  neck: Visualized portions of the lower neck and thyroid gland  are unremarkable.    Chest:   Lungs and pleura: Multiple nodular opacities with a branching pattern  are noted in both lungs, predominantly in the lower lobes and inferior  opacities in the right upper lobe, a few examples seen on series 4,  images 78, 99, 124 and 13. No focal consolidation or mass. No pleural  effusion or pneumothorax.  Mediastinum: No lymphadenopathy by size criteria.  Heart and great vessels: Heart is normal in size and there is no  pericardial effusion. Aorta and main pulmonary artery are within  normal limits in caliber. Moderate triple-vessel coronary artery  calcifications.    Abdomen and pelvis:  Liver: Diffuse markedly decreased attenuation suggestive of steatosis.  Biliary/Gallbladder: No CT evidence of calculi, wall thickening or  pericholecystic fluid. There is no intrahepatic biliary dilatation.  The CBD is dilated up to 1 cm in the proximal portion and shows normal  tapering distally. There is a peritoneal implant noted diverticulum.  No focal intraluminal or extraluminal mass is detected.  Spleen: Normal size. No focal lesions.  Pancreas: No evidence of pancreatic mass or peripancreatic fluid.  Adrenals: Normal.  Kidneys: No hydronephrosis, calculi or mass.  Urinary bladder: Decompressed.  Reproductive organs: Prostate and seminal vesicles are unremarkable.  Gastrointestinal: Postsurgical changes of esophagectomy and  retrosternal gastric pull up surgery. Small and large bowel are normal  in caliber and without abnormal wall thickening.  Mesentery/Peritoneum: No ascites or pneumoperitoneum.  Lymph nodes: No lymphadenopathy.  Vasculature: Mild atherosclerotic calcifications. IVC is normal.     Bones and soft tissues: No acute abnormality. Posterior spinal  fixation hardware is noted in the thoracolumbar region. No destructive  osseous lesions. Postsurgical changes in the right breast. Mild  diffuse anasarca.      Impression     IMPRESSION:  1.  No evidence of metastatic disease in the chest, abdomen or pelvis.  2.  Multiple nodular opacities with a branching pattern are noted in  both lungs are likely due to infection or aspiration. Recommend follow  up to confirm resolution.  3.  Mildly dilated common bile duct without CT evidence for  choledocholithiasis or mass. The dilatation could be due to mass  effect form periampullary duodenal diverticulum.  4.  Diffuse hepatic steatosis.     KATI LAM MD       Discharge Medications   Discharge Medication List as of 8/21/2020 10:49 AM      START taking these medications    Details   methocarbamol (ROBAXIN) 500 MG tablet Take 1 tablet (500 mg) by mouth nightly as needed for muscle spasms, Disp-10 tablet,R-0, E-Prescribe      mirtazapine (REMERON) 15 MG tablet Take 1 tablet (15 mg) by mouth At Bedtime, Disp-60 tablet,R-0, E-Prescribe         CONTINUE these medications which have CHANGED    Details   potassium chloride (KLOR-CON) 20 MEQ packet Take 20 mEq by mouth every morning, Disp-100 packet,R-3, E-Prescribe         CONTINUE these medications which have NOT CHANGED    Details   Acetaminophen (TYLENOL EXTRA STRENGTH PO) Take 1,000 mg by mouth 3 times daily And 500 mg by mouth 2 times daily as needed, Historical      aspirin 81 MG EC tablet Take 81 mg by mouth daily, Historical      furosemide (LASIX) 40 MG tablet Take 0.5 tablets (20 mg) by mouth daily as needed, Disp-30 tablet,R-0, E-Prescribe      gabapentin (NEURONTIN) 100 MG capsule Take 400 mg by mouth At Bedtime, Historical      loperamide (IMODIUM A-D) 2 MG tablet Take 2 mg by mouth daily as needed Give 4mg with first loose stool AND Give 2 mg by mouth as needed for Loose Stools 2 mg with each subsequent loose stool episode after initial 4 mg first dose. NOT TO EXCEED 16 MG IN 24, Historical      !! melatonin 3 MG tablet Take 1 tablet (3 mg) by mouth nightly as needed for sleep, Disp-60 tablet,R-1, E-Prescribe      !! MELATONIN PO  Take 10 mg by mouth At Bedtime , Historical      metoprolol succinate ER (TOPROL-XL) 25 MG 24 hr tablet Take 12.5 mg by mouth every morning, Historical      omeprazole (PRILOSEC) 20 MG DR capsule Take 1 capsule by mouth 3 times daily before meals and at bedtime as needed., Historical      !! OTHER MEDICAL SUPPLIES every morning Daily weights., Historical      !! OTHER MEDICAL SUPPLIES 4 times daily BP checks qid., Historical      !! OTHER MEDICAL SUPPLIES Legs: Black socks and Compriflex LEs. Thigh high wraps. Leave on at all times. If soiled may remove thigh portions.    every shift, Historical      Pediatric Multivit-Minerals-C (CHEWABLES MULTIVITAMIN PO) Take 1 tablet by mouth daily , Historical      vitamin D2 (ERGOCALCIFEROL) 79776 units (1250 mcg) capsule Take 50,000 Units by mouth once a week, Historical       !! - Potential duplicate medications found. Please discuss with provider.      STOP taking these medications       doxepin (SINEQUAN) 10 MG capsule Comments:   Reason for Stopping:         tiZANidine (ZANAFLEX) 4 MG tablet Comments:   Reason for Stopping:             Allergies   Allergies   Allergen Reactions     Amoxicillin Diarrhea     Ativan [Lorazepam] Other (See Comments)     Hallucinations     Morphine Itching

## 2020-08-24 NOTE — PROGRESS NOTES
[facility]: Post-Discharge Note  SITUATION                                                      Admission:    Admission Date: 08/17/20   Reason for Admission: Failure to thrive  Discharge:   Discharge Date: 08/21/20  Discharge Diagnosis: Failure to thrive    BACKGROUND                                                      Minerva Blanco is a 75 year old woman with past medical history significant for coronary artery disease with two drug eluting stents place 4/2018, remote history of breast cancer, esophageal stricture, and recent hospitalization for weakness and fall with negative workup, admitted on 8/17 again for weakness resulting in an atraumatic fall. Similar to the previous hospitalization, on admission, she was noted to be hypokalemic, hypoglycemic, malnourished, and had elevated LFTs.    ASSESSMENT      Discharge Assessment  Patient reports symptoms are: Improved  Does the patient have all of their medications?: Yes  Does patient know what their new medications are for?: Yes  Does patient have a follow-up appointment scheduled?: No  Does patient have any other questions or concerns?: No    Post-op  Did the patient have surgery or a procedure: No  Fever: No  Chills: No  Eating & Drinking: eating and drinking without complaints/concerns  PO Intake: regular diet  Bowel Function: normal  Urinary Status: voiding without complaint/concerns        PLAN                                                      Outpatient Plan:  Adult [facility] Follow-up and recommended labs and tests      Follow up with primary care provider, No primary care provider on file.,   within 7 days for hospital follow- up.  No follow up labs or test are   needed.         No future appointments.        Doris Weinstein, [facility]

## 2020-09-21 PROBLEM — R06.00 ACUTE DYSPNEA: Status: ACTIVE | Noted: 2020-01-01

## 2020-09-21 NOTE — PHARMACY-ADMISSION MEDICATION HISTORY
Admission Medication History Completed by Pharmacy    See [facility] Admission Navigator for allergy information, preferred outpatient pharmacy, prior to admission medications and immunization status.     Medication History Sources:     [facility] MAR #[phone]    Changes made to PTA medication list (reason):    Added:   o Venlafaxine ER 37.5 mg daily  o Folic acid 1 mg daily   o K-Phos Monobasic 250 mg daily   o Thiamine 100 mg daily   o Tizanidine 4 mg at bedtime   o Culturelle capsule BID  o Albuterol HFA 2 puffs q4hr prn  o Refresh Plus solution - 1 drop both eyes QID prn  o Ketotifen 1 drop both eyes daily prn       Deleted:   o APAP  o ASA 81 mg  o Melatonin (duplicate)  o Methocarbamol  o Mirtazapine  o MVI      Changed:   o Gabapentin 400 mg at bedtime --> Gabapentin 100 mg at bedtime   o Loperamide --> 2 mg QID prn  o Potassium chloride 20mEq daily --> Potassium Chloride 20 mEq BID      Additional Information:    Patient has been taking Cefuroxime 500mg BID and Metronidazole 500 TID through day of admission for previously diagnosed pneumonia     Medication history based off of MAR, which dates back to 9/1/2020. Patient was recently discharged from [facility] on 8/21. Many of the changes made on that admission are not reflected in this MAR.     Patient takes Vitamin D on Sundays     Prior to Admission medications    Medication Sig Last Dose Taking? Auth Provider   carboxymethylcellulose PF (REFRESH PLUS) 0.5 % ophthalmic solution Place 1 drop into both eyes 4 times daily as needed for dry eyes 9/18/2020 Yes Unknown, Entered By History   folic acid (FOLVITE) 1 MG tablet Take 1 mg by mouth daily 9/21/2020 at am Yes Unknown, Entered By History   furosemide (LASIX) 40 MG tablet Take 0.5 tablets (20 mg) by mouth daily as needed 9/21/2020 at am Yes Dipak Berger MD   ketotifen (ZADITOR) 0.025 % ophthalmic solution Place 1 drop into both eyes daily as needed for itching 9/18/2020 Yes Unknown, Entered By  History   lactobacillus rhamnosus, GG, (CULTURELL) capsule Take 1 capsule by mouth 2 times daily 9/21/2020 at am Yes Unknown, Entered By History   loperamide (IMODIUM A-D) 2 MG tablet Take 2 mg by mouth 4 times daily as needed  9/21/2020 at 1100 Yes Reported, Patient   melatonin 3 MG tablet Take 1 tablet (3 mg) by mouth nightly as needed for sleep 9/20/2020 at pm Yes Zaira Caruso MD   metoprolol succinate ER (TOPROL-XL) 25 MG 24 hr tablet Take 12.5 mg by mouth every morning 9/21/2020 at am Yes Unknown, Entered By History   omeprazole (PRILOSEC) 20 MG DR capsule Take 1 capsule by mouth 3 times daily before meals and at bedtime as needed. 9/21/2020 at 1100 Yes Unknown, Entered By History   potassium chloride (KLOR-CON) 20 MEQ packet Take 20 mEq by mouth 2 times daily 9/21/2020 at am Yes Unknown, Entered By History   potassium phosphate, monobasic, (K-PHOS) 500 MG tablet Take 250 mg by mouth daily 9/21/2020 at am Yes Unknown, Entered By History   thiamine (B-1) 100 MG tablet Take 100 mg by mouth daily 9/21/2020 at am Yes Unknown, Entered By History   tiZANidine (ZANAFLEX) 4 MG tablet Take 4 mg by mouth At Bedtime 9/20/2020 at pm Yes Unknown, Entered By History   venlafaxine (EFFEXOR-XR) 37.5 MG 24 hr capsule Take 37.5 mg by mouth daily 9/21/2020 at am Yes Unknown, Entered By History   albuterol (PROAIR HFA/PROVENTIL HFA/VENTOLIN HFA) 108 (90 Base) MCG/ACT inhaler Inhale 1-2 puffs into the lungs every 4 hours as needed for shortness of breath / dyspnea or wheezing More than a month at Unknown time  Unknown, Entered By History   gabapentin (NEURONTIN) 100 MG capsule Take 100 mg by mouth At Bedtime  9/20/2020 at pm  Unknown, Entered By History   OTHER MEDICAL SUPPLIES every morning Daily weights.   Reported, Patient   OTHER MEDICAL SUPPLIES 4 times daily BP checks qid.   Reported, Patient   OTHER MEDICAL SUPPLIES Legs: Black socks and Compriflex LEs. Thigh high wraps. Leave on at all times. If soiled  may remove thigh portions.    every shift   Reported, Patient   vitamin D2 (ERGOCALCIFEROL) 57331 units (1250 mcg) capsule Take 50,000 Units by mouth once a week 9/20/2020 at am  Unknown, Entered By History       Date completed: 09/21/20    Medication history completed by: Antoine Castle, PharmD

## 2020-09-21 NOTE — ED TRIAGE NOTES
Patient presents via EMS from [facility] for c/o SOB. EMS reports recent PNA diagnosis and patient on PO antibiotic, but has worsening SOB. Patient reports SOB with activity and drop in O2 sats while at rest. Patient on 1L O2 at TCU.

## 2020-09-21 NOTE — LETTER
Health Information Management Services               Recip[facility]  Attn: Nisha      Sender:    Saul Gonzalez, KOTA, LGSW      Date: September 25, 2020  Patient Name:  Minerva Blanco  Routing Message:      Enclosed are physician orders for Nicholas Blanco. Scheduled w/c ride for 4:15pm today.      The documents accompanying this notice contain confidential information belonging to the sender.  This information is intended only for the use of the individual or entity named above.  The authorized recipient of this information is prohibited from disclosing this information to any other party and is required to destroy the information after its stated need has been fulfilled, unless otherwise required by state law.      If you are not the intended recipient, you are hereby notified that any disclosure, copy, distribution or action taken in reliance on the contents of these documents is strictly prohibited.  If you have received this document in error, please return it by fax to [phone] with a note on the cover sheet explaining why you are returning it (e.g. not your patient, not your provider, etc.).  If you need further assistance, please call [facility] Centralized Transcription at [phone].  Documents may also be returned by mail to [employer], , [address], [place], [place] [address].

## 2020-09-21 NOTE — H&P
[facility], [place]    History and Physical - Hospitalist Service, Gold Night       Date of Admission:  9/21/2020    Assessment & Plan   Minerva Blanco is a 75 year old female admitted on 9/21/2020. She has a past medical history significant for MI, CAD s/p stent (2018), chronic HFpEF, MS, breast cancer s/p lumpectomy with lymph node, meniere's disease, paroxysmal afib, GERD s/p multiple esophageal surgeries who presented with worsening dyspnea and weakness. She was recently started on antibiotics for presumed LLL aspiration pneumonia, and noted to have hypoxia requiring supplemental O2 at TCU.    Shortness of breath  COVID-19 PUI  LLL bacterial pneumonia, presumed aspiration  Diagnosed with LLL pneumonia 9/17 and started on flagyl, ceftin. Presented from TCU with worsening shortness of breath, intermittent chest heaviness, fatigue and weakness. Also with diarrhea, loss of appetite which began 1 week ago. No fever or cough. WBC 7.1. Trop neg. CRP minimally elevated 8.7. Procal low. CXR with no acute airspace opacity.  - consider speech therapy to assess for aspiration once COVID r/o  - IV unasyn for presumed aspiration PNA  - albuterol HFA  - pending COVID; covid precautions  - IS  - continuous pulse oximetry     Chronic HFpEF  Anasarca  Bilateral upper/lower extremity swelling. BNP 2039. Echo in ED with hyperkinetic LV, EF 65-70%, mod-severe tricuspid insufficiency, pulmonary hypertension. No significant change from previous study 8/2. Albumin 1.5. Anasarca likely worsened by poor nutritional status.  - holding pta lasix 20 mg daily prn as she appears intravascularly dry  - daily weights    H/o CAD s/p ADOLFO x 2 (2018)  Paroxysmal afib not on AC  Previously on aspirin; discontinued.   - pta metoprolol ER 25 mg daily  - med/surg telemetry    Macrocytic anemia  Hgb 10.9, . Previous workup includes Vit B12, folate, and ceruloplasmin level all wnl. Normal urine potassium:creat  ratio. Etiology presumed nutritional as well as component of alcohol use.  - trend CBC  - pta folate, thiamine    Hyperbilirubinemia  Elevated Alk Phos  H/o dilated common bile duct  Steatatosis  Tbili 2.1, Alk Phos 244. Etoh neg. INR 1.45. Albumin 1.5, recent prealbumin 5.1. During previous admission 8/17 there was concern for alcohol use and possible malignancy. CT negative for masses. H/o increasing biliary labs. Bile duct dilation noted on US and CT starting in 2017. Advised to abstain from alcohol and discontinue tizanidine due to concerns for underlying liver disease; however still on tizanidine at bedtime. Per note from TCU; PIPPA 0.025, drinks 1-2 glasses of wine every night.  - avoid hepatotoxic medications  - trending LFTs, INR  - MRCP   - consider GI consult     Hypocalcemia  Ionized calcium 4.0. Ca corrected 9.7. Likely related to overall poor nutritional status.  - po calcium citrate 950 mg x 1 dose  - ionized Ca in AM    Failure to thrive  Generalized weakness  Severe malnutrition   Hypophosphatemia  Hypomagenesemia  Phos 1.6, Mg 1.4. Also with low albumin, anasarca. Overall poor nutritional status. Has had extensive GI work-up in the past for nutritional deficiencies and chronic diarrhea.  - Nutrition consult  - Multivitamin  - pta thiamine 100 mg daily, folate 1 mg daily  - Holding pta K-Phos monobasic  - electrolyte protocol for Mg, Phos  - pta KCl 20 mEq BID  - PT, OT consult  - trending BMP, Mg, Phos    Depression  TCU discontinued doxepin; started low dose effexor xr 37.5 mg  - pta effexor    IBS  Worse with antibiotics. Recently tested for Cdiff, negative.   - pta loperamide 2 mg QID prn  - pta culurelle capsule BID  - fecal electrolytes     Stable/Chronic medical problems:  Hx of MS - Felt to be stable; evaluated by Neuro while at [facility]  H/o breast cancer s/p lumpectomy R limb edema > L limb due to lymph node resection.    GERD  H/o esophageal stricture  Underwent nissen fundoplication 2016 due to  hiatal hernia.  - pta prilosec 20 mg TID  - pta prilosec 20 mg prn at bedtime      Diet: Cardiac diet  DVT Prophylaxis: Enoxaparin (Lovenox) SQ  Whitt Catheter: not present  Code Status: DNR/DNI  Minerva is a LOW SUSPICION PUI.  Follow these instructions:    If COVID test positive -> continue isolation precautions    If COVID test negative -> discontinue COVID-specific isolation precautions    Disposition Plan   Expected discharge: 2 - 3 days, recommended to transitional care unit once repeat imaging completed, dyspnea improving, and nutritonal deficiencies stable.     The patient's care was discussed with the Attending Physician, Dr. Britt.    Caterina Garcia PA-C  [facility], [place]  Pager: [phone]  Please see sticky note for cross cover information  ______________________________________________________________________    Chief Complaint   Weakness, shortness of breath    History is obtained from the patient    History of Present Illness   Minerva Blanco is a 75 year old female who has a past medical history significant for MI, CAD s/p stent (2018), chronic HFpEF, MS, breast cancer s/p lumpectomy with lymph node, meniere's disease, paroxysmal afib, GERD s/p multiple esophageal surgeries and multiple recent hospitalizations related to failure to thrive, malnourishment, and weakness.    She presented with worsening shortness of breath. Of note, has been hospitalized 3 times in the last 2 months. Most recently admitted to [facility] for failure to thrive, hypotension. Concern at that time related to her downplaying her alcohol use and not eating. She was diagnosed with aspiration pneumonia and started on cefuroxime. Repeat CXR at TCU with LLL pneumonia and bilateral effusions. Was started on flagyl in addition to cefuroxime. Still with shortness of breath and chest heaviness. Feels overall weak and unable to stand for long periods of time. Relying on wheelchair for transport. Poor appetite  and has diarrhea frequently with eating. This has been chronic but worsened by antibiotics. Denies fevers or chills. No cough.     Review of Systems    The 10 point Review of Systems is negative other than noted in the HPI or here.     Past Medical History    I have reviewed this patient's medical history and updated it with pertinent information if needed.   Past Medical History:   Diagnosis Date     Breast cancer (H) 2007    right side - lumpectomy, chemo, and local radiation     Chronic infection     infection/wound for two years after esoph surgery     Compression fracture of spine (H)     T12-L1     Coronary artery disease 04/2018    s/p PCI with ADOLFO to proximal and mid RCA     Esophageal perforation      Fibromyalgia      GERD (gastroesophageal reflux disease)      Hiatal hernia      IBS (irritable bowel syndrome)      Meniere's disease     deaf left ear     Multiple sclerosis (H)      Noninfectious ileitis      Other chronic pain      Paroxysmal atrial fibrillation (H)        Past Surgical History   I have reviewed this patient's surgical history and updated it with pertinent information if needed.  Past Surgical History:   Procedure Laterality Date     APPENDECTOMY       BACK SURGERY  5/1/2015    lumbar fusion     BRONCHOSCOPY FLEXIBLE N/A 4/17/2017    Procedure: BRONCHOSCOPY FLEXIBLE;;  Surgeon: Jens Wise MD;  [facility]     C GASTROSTOMY/JEJUN TUBE      J tube for feedings     CLOSE SPIT FISTULA N/A 4/17/2017    Procedure: CLOSE SPIT FISTULA;;  Surgeon: Jens Wise MD;  [facility]     COMBINED ESOPHAGOSCOPY, GASTROSCOPY, DUODENOSCOPY (EGD), REMOVE ESOPHAGEAL STENT N/A 8/26/2016    Procedure: COMBINED ESOPHAGOSCOPY, GASTROSCOPY, DUODENOSCOPY (EGD), REMOVE ESOPHAGEAL STENT;  Surgeon: Jens Wise MD;  [facility]     COMBINED ESOPHAGOSCOPY, GASTROSCOPY, DUODENOSCOPY (EGD), REMOVE ESOPHAGEAL STENT N/A 2/12/2018    Procedure: COMBINED ESOPHAGOSCOPY,  GASTROSCOPY, DUODENOSCOPY (EGD), REMOVE ESOPHAGEAL STENT;  Esophagogastroduodenoscopy With Stent Removal;  Surgeon: Jens Wise MD;  [facility]     COMBINED ESOPHAGOSCOPY, GASTROSCOPY, DUODENOSCOPY (EGD), REPLACE ESOPHAGEAL STENT N/A 8/25/2017    Procedure: COMBINED ESOPHAGOSCOPY, GASTROSCOPY, DUODENOSCOPY (EGD), REPLACE ESOPHAGEAL STENT;;  Surgeon: Jens Wise MD;  [facility]     COMBINED ESOPHAGOSCOPY, GASTROSCOPY, DUODENOSCOPY (EGD), REPLACE ESOPHAGEAL STENT N/A 9/15/2017    Procedure: COMBINED ESOPHAGOSCOPY, GASTROSCOPY, DUODENOSCOPY (EGD), REPLACE ESOPHAGEAL STENT;  Upper Endoscopy with Stent Exchange, Cauterization of Tracheosophageal Fistula, Cauterization of Chest Wound   ;  Surgeon: Jens Wise MD;  [facility]     COMBINED ESOPHAGOSCOPY, GASTROSCOPY, DUODENOSCOPY (EGD), REPLACE ESOPHAGEAL STENT N/A 10/20/2017    Procedure: COMBINED ESOPHAGOSCOPY, GASTROSCOPY, DUODENOSCOPY (EGD), REPLACE ESOPHAGEAL STENT;  Upper Endoscopy with Stent Exchange, Cauterization Tracheosphageal Fistula Tract;  Surgeon: Nalini Barreto MD;  [facility]     COMBINED ESOPHAGOSCOPY, GASTROSCOPY, DUODENOSCOPY (EGD), REPLACE ESOPHAGEAL STENT N/A 11/3/2017    Procedure: COMBINED ESOPHAGOSCOPY, GASTROSCOPY, DUODENOSCOPY (EGD), REPLACE ESOPHAGEAL STENT;  Esophagogastroduodenoscopy, Stent Revision, Cauterization Of Traceoesophageal Fistula and stent exchange;  Surgeon: Nalini Barreto MD;  [facility]     COMBINED ESOPHAGOSCOPY, GASTROSCOPY, DUODENOSCOPY (EGD), REPLACE ESOPHAGEAL STENT N/A 11/17/2017    Procedure: COMBINED ESOPHAGOSCOPY, GASTROSCOPY, DUODENOSCOPY (EGD), REPLACE ESOPHAGEAL STENT;  Esophagogastroduodenoscopy, Esophogeal Stent Removal, Esophogeal Stent Placement ([id number] [id number]), Cauterization Of Fistula Tract;  Surgeon: Nalini Barreto MD;  [facility]     CREATE SPIT FISTULA N/A 1/9/2017    Procedure: CREATE SPIT FISTULA;  Surgeon: Jens Wise  MD Kg;  [facility]     ESOPHAGECTOMY N/A 1/9/2017    Procedure: ESOPHAGECTOMY;  Surgeon: Jens Wise MD;  [facility]     ESOPHAGOSCOPY, GASTROSCOPY, DUODENOSCOPY (EGD), COMBINED N/A 4/18/2016    Procedure: COMBINED ESOPHAGOSCOPY, GASTROSCOPY, DUODENOSCOPY (EGD);  Surgeon: Alexis Barraza MD;  [facility]     ESOPHAGOSCOPY, GASTROSCOPY, DUODENOSCOPY (EGD), COMBINED N/A 4/25/2016    Procedure: COMBINED ESOPHAGOSCOPY, GASTROSCOPY, DUODENOSCOPY (EGD);  Surgeon: Alexis Barraza MD;  [facility]     ESOPHAGOSCOPY, GASTROSCOPY, DUODENOSCOPY (EGD), COMBINED N/A 5/4/2016    Procedure: COMBINED ESOPHAGOSCOPY, GASTROSCOPY, DUODENOSCOPY (EGD);  Surgeon: Alexis Barraza MD;  [facility]     ESOPHAGOSCOPY, GASTROSCOPY, DUODENOSCOPY (EGD), COMBINED N/A 5/18/2016    Procedure: COMBINED ESOPHAGOSCOPY, GASTROSCOPY, DUODENOSCOPY (EGD);  Surgeon: Alexis Barraza MD;  [facility]     ESOPHAGOSCOPY, GASTROSCOPY, DUODENOSCOPY (EGD), COMBINED N/A 6/22/2016    Procedure: COMBINED ESOPHAGOSCOPY, GASTROSCOPY, DUODENOSCOPY (EGD);  Surgeon: Alexis Barraza MD;  [facility]     ESOPHAGOSCOPY, GASTROSCOPY, DUODENOSCOPY (EGD), COMBINED N/A 7/12/2016    Procedure: COMBINED ESOPHAGOSCOPY, GASTROSCOPY, DUODENOSCOPY (EGD);  Surgeon: Jens Wise MD;  [facility]     ESOPHAGOSCOPY, GASTROSCOPY, DUODENOSCOPY (EGD), COMBINED N/A 4/21/2017    Procedure: COMBINED ESOPHAGOSCOPY, GASTROSCOPY, DUODENOSCOPY (EGD);  Esophagogastroduodenoscopy, Pharyngostomy Tube Placement ;  Surgeon: Jens Wise MD;  [facility]     ESOPHAGOSCOPY, GASTROSCOPY, DUODENOSCOPY (EGD), COMBINED N/A 5/19/2017    Procedure: COMBINED ESOPHAGOSCOPY, GASTROSCOPY, DUODENOSCOPY (EGD);  Upper Endoscopy, Irrigation and Debridement of Chest Wound ;  Surgeon: Nalini Barreto MD;  [facility]     ESOPHAGOSCOPY, GASTROSCOPY, DUODENOSCOPY (EGD), COMBINED  N/A 5/23/2017    Procedure: COMBINED ESOPHAGOSCOPY, GASTROSCOPY, DUODENOSCOPY (EGD);;  Surgeon: Nalini Barreto MD;  [facility]     ESOPHAGOSCOPY, GASTROSCOPY, DUODENOSCOPY (EGD), COMBINED N/A 6/27/2017    Procedure: COMBINED ESOPHAGOSCOPY, GASTROSCOPY, DUODENOSCOPY (EGD);  Esophagogastroduodenoscopy With Removal And Replacement Of Esophageal Stent exchange. Exchange of pharyngtomy tube. Chest wound dressing change;  Surgeon: Nalini Barreto MD;  [facility]     ESOPHAGOSCOPY, GASTROSCOPY, DUODENOSCOPY (EGD), COMBINED N/A 8/4/2017    Procedure: COMBINED ESOPHAGOSCOPY, GASTROSCOPY, DUODENOSCOPY (EGD);  Esophagogastroduodenoscopy, Stent Removal and Stent Replacement. Dressing Change ;  Surgeon: Nalini Barreto MD;  [facility]     ESOPHAGOSCOPY, GASTROSCOPY, DUODENOSCOPY (EGD), COMBINED N/A 8/25/2017    Procedure: COMBINED ESOPHAGOSCOPY, GASTROSCOPY, DUODENOSCOPY (EGD);  Esophagogastroduodenoscopy,  Stent Revision,  Cauterization;  Surgeon: Jens Wise MD;  [facility]     ESOPHAGOSCOPY, GASTROSCOPY, DUODENOSCOPY (EGD), COMBINED N/A 10/2/2017    Procedure: COMBINED ESOPHAGOSCOPY, GASTROSCOPY, DUODENOSCOPY (EGD);  Esophagogastroduodenostomy, Replacement of Esophageal Stent, Cauterization of Tracheosophageal Fistula anc chest wall,   C-arm;  Surgeon: Nalini Barreto MD;  [facility]     ESOPHAGOSCOPY, GASTROSCOPY, DUODENOSCOPY (EGD), DILATATION, COMBINED N/A 6/29/2016    Procedure: COMBINED ESOPHAGOSCOPY, GASTROSCOPY, DUODENOSCOPY (EGD), DILATATION;  Surgeon: Jens Wise MD;  [facility]     EXPLORE NECK N/A 5/19/2017    Procedure: EXPLORE NECK;;  Surgeon: Nalini Barreto MD;  [facility]     HC UGI ENDOSCOPY W TRANSENDOSCOPIC STENT PLACEMENT N/A 7/12/2016    Procedure: COMBINED ESOPHAGOSCOPY, GASTROSCOPY, DUODENOSCOPY (EGD), PLACE TRANSENDOSCOPIC ESOPHAGEAL STENT;  Surgeon: Jens Wise MD;  [facility]      UGI ENDOSCOPY W TRANSENDOSCOPIC  STENT PLACEMENT N/A 7/22/2016    Procedure: COMBINED ESOPHAGOSCOPY, GASTROSCOPY, DUODENOSCOPY (EGD), PLACE TRANSENDOSCOPIC ESOPHAGEAL STENT;  Surgeon: Jens Wise MD;  [facility]     INCISION AND DRAINAGE CHEST WASHOUT, COMBINED N/A 5/27/2017    Procedure: COMBINED INCISION AND DRAINAGE CHEST WASHOUT;  Chest washout;  Surgeon: Nalini Barreto MD;  [facility]     INCISION AND DRAINAGE CHEST WASHOUT, COMBINED N/A 5/28/2017    Procedure: COMBINED INCISION AND DRAINAGE CHEST WASHOUT;  Chest washout;  Surgeon: Nalini Barreto MD;  [facility]     INCISION AND DRAINAGE CHEST WASHOUT, COMBINED N/A 6/9/2017    Procedure: COMBINED INCISION AND DRAINAGE CHEST WASHOUT;  Chest washout and dressing change, Esophaogastroduodenoscopy;  Surgeon: Jens Wise MD;  [facility]     INCISION AND DRAINAGE CHEST WASHOUT, COMBINED N/A 7/17/2017    Procedure: COMBINED INCISION AND DRAINAGE CHEST WASHOUT;  Incision and Drainage of right Chest Wound, Esophagogastroduodenoscopy with stent exchange. pharangostomy tube revision;  Surgeon: Jens Wise MD;  [facility]     IRRIGATION AND DEBRIDEMENT CHEST WASHOUT, COMBINED N/A 6/29/2016    Procedure: COMBINED IRRIGATION AND DEBRIDEMENT CHEST WASHOUT;  Surgeon: Jens Wise MD;  [facility]     IRRIGATION AND DEBRIDEMENT CHEST WASHOUT, COMBINED N/A 5/23/2017    Procedure: COMBINED IRRIGATION AND DEBRIDEMENT CHEST WASHOUT;  COMBINED IRRIGATION AND DEBRIDEMENT CHEST WASHOUT,COMBINED ESOPHAGOSCOPY, GASTROSCOPY, DUODENOSCOPY (EGD) ;  Surgeon: Nalini Barreto MD;  [facility]     IRRIGATION AND DEBRIDEMENT CHEST WASHOUT, COMBINED N/A 5/24/2017    Procedure: COMBINED IRRIGATION AND DEBRIDEMENT CHEST WASHOUT;  Chest wound Washout and Dressing Change;  Surgeon: Nalini Barreto MD;  [facility]     IRRIGATION AND DEBRIDEMENT CHEST WASHOUT, COMBINED N/A 5/25/2017    Procedure: COMBINED IRRIGATION AND DEBRIDEMENT CHEST  WASHOUT;  Irrigation and Debridement Chest Washout and dressing change;  Surgeon: Nalini Barreto MD;  [facility]     IRRIGATION AND DEBRIDEMENT CHEST WASHOUT, COMBINED N/A 5/26/2017    Procedure: COMBINED IRRIGATION AND DEBRIDEMENT CHEST WASHOUT;  Irrigation and Debridement Chest Washout with  dressing change;  Surgeon: Nalini Barreto MD;  [facility]     IRRIGATION AND DEBRIDEMENT CHEST WASHOUT, COMBINED N/A 5/30/2017    Procedure: COMBINED IRRIGATION AND DEBRIDEMENT CHEST WASHOUT;  Irrigation and Debridement Chest Washout , PICC line dressing change;  Surgeon: Nalini Barreto MD;  [facility]     IRRIGATION AND DEBRIDEMENT CHEST WASHOUT, COMBINED N/A 5/31/2017    Procedure: COMBINED IRRIGATION AND DEBRIDEMENT CHEST WASHOUT;  Irrigation and Debridement Chest Washout ;  Surgeon: Nalini Barreto MD;  [facility]     IRRIGATION AND DEBRIDEMENT CHEST WASHOUT, COMBINED N/A 6/1/2017    Procedure: COMBINED IRRIGATION AND DEBRIDEMENT CHEST WASHOUT;  Chest Wound Irrigation And Debridement with dressing change ;  Surgeon: Nalini Barreto MD;  [facility]     IRRIGATION AND DEBRIDEMENT CHEST WASHOUT, COMBINED N/A 6/4/2017    Procedure: COMBINED IRRIGATION AND DEBRIDEMENT CHEST WASHOUT;  COMBINED IRRIGATION AND DEBRIDEMENT CHEST WASHOUT, Esophagoscopy, Gastroscopy, Duodenoscopy (EGD) place transendoscopic esophageal stent,   Pharyngostomy and chest wall tube exchange  C-Arm;  Surgeon: Jens Wise MD;  [facility]     IRRIGATION AND DEBRIDEMENT CHEST WASHOUT, COMBINED N/A 6/2/2017    Procedure: COMBINED IRRIGATION AND DEBRIDEMENT CHEST WASHOUT;  Chest Wound Irrigation and Debridement, Dressing Change;  Surgeon: Nalini Barreto MD;  [facility]     LAPAROSCOPIC ASSISTED INSERTION TUBE JEJUNOSTOMY N/A 4/17/2017    Procedure: LAPAROSCOPIC ASSISTED INSERTION TUBE JEJUNOSTOMY;;  Surgeon: Jens Wise MD;  [facility]     LAPAROSCOPY DIAGNOSTIC (GENERAL) N/A 1/9/2017     Procedure: LAPAROSCOPY DIAGNOSTIC (GENERAL);  Surgeon: Jens Wise MD;  [facility]     LAPAROSCOPY DIAGNOSTIC (GENERAL) N/A 4/17/2017    Procedure: LAPAROSCOPY DIAGNOSTIC (GENERAL);  Laparoscopic , Neck Dissection, Spit Fistula Takedown, Laparoscopic Jejunostomy Tube and Pharyngostomy Tube, Gastric Pull up, Upper Endoscopy(EGD)  , Flexible Bronchoscopy ,Sternotomy ;  Surgeon: Jens Wise MD;  [facility]     LUMPECTOMY BREAST Right 2007     NERVE BLOCK PERIPHERAL N/A 8/30/2016    Procedure: NERVE BLOCK PERIPHERAL;  Surgeon: GENERIC ANESTHESIA PROVIDER;  [facility]     NISSEN FUNDOPLICATION  1/2016     PHARYNGOSTOMY N/A 4/18/2016    Procedure: PHARYNGOSTOMY;  Surgeon: Alexis Barraza MD;  [facility]     PHARYNGOSTOMY N/A 4/25/2016    Procedure: PHARYNGOSTOMY;  Surgeon: Alexis Barraza MD;  [facility]     PHARYNGOSTOMY N/A 5/4/2016    Procedure: PHARYNGOSTOMY;  Surgeon: Alexis Barraza MD;  [facility]     PHARYNGOSTOMY N/A 6/22/2016    Procedure: PHARYNGOSTOMY;  Surgeon: Alexis Barraza MD;  [facility]     PHARYNGOSTOMY N/A 6/29/2016    Procedure: PHARYNGOSTOMY;  Surgeon: Jens Wise MD;  [facility]     PHARYNGOSTOMY N/A 4/21/2017    Procedure: PHARYNGOSTOMY;;  Surgeon: Jens Wise MD;  [facility]     PHARYNGOSTOMY Left 5/31/2017    Procedure: PHARYNGOSTOMY;;  Surgeon: Nalini Barreto MD;  [facility]     PICC INSERTION Left 8/25/2016    5fr DL BioFlo PICC, 42cm (3cm external) in the L medial brachial vein w/ tip in the SVC RA junction.     PICC INSERTION - Rewire Right 05/31/2017    5fr DL BioFlo PICC, 40cm (6cm external) in the R medial brachial vein w/ tip in the SVC RA junction.     REPAIR FISTULA TRACHEOESOPHAGEAL N/A 9/15/2017    Procedure: REPAIR FISTULA TRACHEOESOPHAGEAL;;  Surgeon: Jens Wise MD;  [facility]     THORACOTOMY Right 1998    lung  "infection - \"hard crust formed on lung\"     THORACOTOMY Left 2017    Procedure: THORACOTOMY;  Surgeon: Jens Wise MD;  [facility]     WRIST SURGERY         Social History   I have reviewed this patient's social history and updated it with pertinent information if needed.  Social History     Tobacco Use     Smoking status: Former Smoker     Packs/day: 1.00     Years: 15.00     Pack years: 15.00     Types: Cigarettes     Last attempt to quit: 1998     Years since quittin.7     Smokeless tobacco: Never Used   Substance Use Topics     Alcohol use: No     Alcohol/week: 0.0 standard drinks     Drug use: No       Family History   I have reviewed this patient's family history and updated it with pertinent information if needed.  Family History   Problem Relation Age of Onset     Alzheimer Disease Mother      Cerebrovascular Disease Father         cerebral hemorrhage     Ovarian Cancer Sister      Breast Cancer Daughter        Prior to Admission Medications   Prior to Admission Medications   Prescriptions Last Dose Informant Patient Reported? Taking?   Acetaminophen (TYLENOL EXTRA STRENGTH PO)   Yes No   Sig: Take 1,000 mg by mouth 3 times daily And 500 mg by mouth 2 times daily as needed   MELATONIN PO   Yes No   Sig: Take 10 mg by mouth At Bedtime    OTHER MEDICAL SUPPLIES   Yes No   Sig: every morning Daily weights.   OTHER MEDICAL SUPPLIES   Yes No   Si times daily BP checks qid.   OTHER MEDICAL SUPPLIES   Yes No   Sig: Legs: Black socks and Compriflex LEs. Thigh high wraps. Leave on at all times. If soiled may remove thigh portions.    every shift   Pediatric Multivit-Minerals-C (CHEWABLES MULTIVITAMIN PO)   Yes No   Sig: Take 1 tablet by mouth daily    aspirin 81 MG EC tablet   Yes No   Sig: Take 81 mg by mouth daily   furosemide (LASIX) 40 MG tablet   No No   Sig: Take 0.5 tablets (20 mg) by mouth daily as needed   gabapentin (NEURONTIN) 100 MG capsule   Yes No   Sig: Take 400 mg " by mouth At Bedtime   loperamide (IMODIUM A-D) 2 MG tablet   Yes No   Sig: Take 2 mg by mouth daily as needed Give 4mg with first loose stool AND Give 2 mg by mouth as needed for Loose Stools 2 mg with each subsequent loose stool episode after initial 4 mg first dose. NOT TO EXCEED 16 MG IN 24   melatonin 3 MG tablet   No No   Sig: Take 1 tablet (3 mg) by mouth nightly as needed for sleep   methocarbamol (ROBAXIN) 500 MG tablet   No No   Sig: Take 1 tablet (500 mg) by mouth nightly as needed for muscle spasms   metoprolol succinate ER (TOPROL-XL) 25 MG 24 hr tablet   Yes No   Sig: Take 12.5 mg by mouth every morning   mirtazapine (REMERON) 15 MG tablet   No No   Sig: Take 1 tablet (15 mg) by mouth At Bedtime   omeprazole (PRILOSEC) 20 MG DR capsule   Yes No   Sig: Take 1 capsule by mouth 3 times daily before meals and at bedtime as needed.   potassium chloride (KLOR-CON) 20 MEQ packet   No No   Sig: Take 20 mEq by mouth every morning   vitamin D2 (ERGOCALCIFEROL) 37755 units (1250 mcg) capsule   Yes No   Sig: Take 50,000 Units by mouth once a week      Facility-Administered Medications: None     Allergies   Allergies   Allergen Reactions     Amoxicillin Diarrhea     Ativan [Lorazepam] Other (See Comments)     Hallucinations     Morphine Itching       Physical Exam   Vital Signs: Temp: 98  F (36.7  C) Temp src: Oral BP: 111/70 Pulse: 74   Resp: 18 SpO2: 93 % O2 Device: None (Room air) Oxygen Delivery: 3 LPM  Weight: 107 lbs 3.2 oz    General Appearance: Awake, alert and oriented. Well-developed, frail, malnourished female in no acute distress.  Eyes: Normal lids. Anicteric sclera. Right pupil larger than left at baseline. Reactive to light.  HEENT: Normocephalic, atraumatic. Nares patent. Oropharynx clear. Mucous membranes dry.  Respiratory: Normal work of breathing on room air. Lungs with crackles throughout bases. No wheezes. Good air flow.  Cardiovascular: RRR. S1, S2. No murmurs, rubs or gallops. Pedal pulses 2+.  4+ pitting edema to bilateral lower extremities to mid thigh. Nonpitting edema to bilateral upper extremities, R>L. Right arm with weeping.  GI: Abdomen non-distended. Normoactive bowel sounds. Soft. Slight tenderness in RUQ. No rebounding or guarding.   Lymph/Hematologic: No abnormal bruising.  Skin: No jaundice. No rashes on exposed skin.  Musculoskeletal: Moves all extremities equally.   Neurologic: CN II-XII grossly intact.     Data   Data reviewed today: I reviewed all medications, new labs and imaging results over the last 24 hours. I personally reviewed no images or EKG's today.    Recent Labs   Lab 09/21/20  1812 09/21/20  1408   WBC  --  7.1   HGB  --  10.9*   MCV  --  123*   PLT  --  335   NA  --  137   POTASSIUM  --  4.6   CHLORIDE  --  107   CO2  --  26   BUN  --  5*   CR 0.61 0.47*   ANIONGAP  --  4   ANNABELLE  --  7.7*   GLC  --  85   ALBUMIN  --  1.5*   PROTTOTAL  --  5.2*   BILITOTAL  --  2.1*   ALKPHOS  --  244*   ALT  --  29   AST  --  Canceled, Test credited   TROPI  --  <0.015     Recent Results (from the past 24 hour(s))   XR Chest Port 1 View    Narrative    EXAM: XR CHEST PORT 1 VW  9/21/2020 1:35 PM     HISTORY:  SOB       COMPARISON:  8/18/2020    FINDINGS:   Single frontal radiograph of the chest. Lower thoracic and lumbar  spinal instrumentation appears grossly.    The trachea is midline. The cardiomediastinal silhouette is  well-defined. The pulmonary vasculature are distinct. No acute  airspace opacity, pleural effusion or appreciable pneumothorax.    Osteopenic-appearing bones. No acute osseous abnormalities. The  included soft tissues and upper abdomen and are within normal limits.         Impression    IMPRESSION: No acute airspace opacity.     I have personally reviewed the examination and initial interpretation  and I agree with the findings.    MAYDA ESCOBAR MD   Echo Limited    Narrative    [id number]  [id number]  [id number]  [id number]^ROSELYN^VIOLETA^[facility]  [facility],[place]  Echocardiography Laboratory  [address] [place] [address]     Name: FAVIAN MALONE  MRN: [id number]  : 1945  Study Date: 2020 02:56 PM  Age: 75 yrs  Gender: Female  Patient Location: [facility]  Reason For Study: SOB  Ordering Physician: VIOLETA DEMPSEY  Performed By: BELIA Salmeron     BSA: 1.4 m2  Height: 60 in  Weight: 107 lb  HR: 72  BP: 111/70 mmHg  _____________________________________________________________________________  __        Procedure  Limited Portable Echo Adult. Contrast Optison. Technically difficult study.  Patient was given 5 ml mixture of 3 ml Optison and 6 ml saline. 4 ml wasted.  _____________________________________________________________________________  __        Interpretation Summary  Global and regional left ventricular function is hyperkinetic with an EF of  65-70%.  Right ventricular function, chamber size, wall motion, and thickness are  normal.  Moderate to severe tricuspid insufficiency is present.  PA systolic pressure is at least 37 mmHg above RA pressure. PA pressure may be  underestimated due to significant TR.  This study was compared with the study from 20 .  There has been no change.  _____________________________________________________________________________  __        Left Ventricle  Global and regional left ventricular function is hyperkinetic with an EF of  65-70%. Left ventricular size is normal. Left ventricular wall thickness is  normal. Left ventricular diastolic function is not assessable. Abnormal non-  specific septal motion is present.     Right Ventricle  Right ventricular function, chamber size, wall motion, and thickness are  normal.     Atria  The atria cannot be assessed.     Mitral Valve  Trace mitral insufficiency is present.        Aortic Valve  Mild aortic insufficiency is present.     Tricuspid Valve  Moderate to severe tricuspid insufficiency is present. PA systolic pressure is  at least 37  mmHg above RA pressure. PA pressure may be underestimated due to  significant TR.     Pulmonic Valve  The pulmonic valve cannot be assessed.     Vessels  The inferior vena cava cannot be assessed.     Pericardium  No pericardial effusion is present.        Compared to Previous Study  This study was compared with the study from 20 . There has been no change.  _____________________________________________________________________________  __  MMode/2D Measurements & Calculations     RVDd: 3.3 cm  IVSd: 0.76 cm  LVIDd: 3.4 cm  LVIDs: 2.4 cm  LVPWd: 0.74 cm  FS: 28.6 %  LV mass(C)d: 67.0 grams  LV mass(C)dI: 46.8 grams/m2     EF(MOD-bp): 70.2 %  RWT: 0.43  TAPSE: 1.7 cm        Doppler Measurements & Calculations  TR max jorge: 302.0 cm/sec  TR max P.5 mmHg        _____________________________________________________________________________  __        Report approved by: Anjelica Sim 2020 03:45 PM

## 2020-09-21 NOTE — ED PROVIDER NOTES
ED Provider Note  [facility]      History     Chief Complaint   Patient presents with     Shortness of Breath     The history is provided by the patient and medical records.     Minerva Blanco is a 75 year old female with a complex medical history significant for MI, CAD s/p stenting (2018), chronic diastolic heart failure, multiple sclerosis, breast cancer s/p lumpectomy and lymph node dissection, meniere's disease, paroxysmal afib, GERD s/p multiple esophageal surgeries, and malnutrition who presents to the Emergency Department from [facility] with shortness of breath. Patient was recently diagnosed with pneumonia of the left lower lobe due to infectious organism and was started on Flagyl and Ceftin beginning 9/17/2020.    Here in the Emergency Department, the patient reports feeling short of breath with minimal exertion. She reports that it is difficult for her to walk and that she needs help getting up and going to the restroom. She states she has intermittent chest heaviness, that lasts 1-2 hours. She reports feeling fatigued and weak. She denies any unilateral weakness. She reports having diarrhea, with 1 episode today and loss of appetite. She states these symptoms began 7 days ago. The patient also complains of bilateral upper and lower extremity swelling that has worsened in the last 7 days. She denies fever and cough.     Past Medical History  Past Medical History:   Diagnosis Date     Breast cancer (H) 2007    right side - lumpectomy, chemo, and local radiation     Chronic infection     infection/wound for two years after esoph surgery     Compression fracture of spine (H)     T12-L1     Coronary artery disease 04/2018    s/p PCI with ADOLFO to proximal and mid RCA     Esophageal perforation      Fibromyalgia      GERD (gastroesophageal reflux disease)      Hiatal hernia      IBS (irritable bowel syndrome)      Meniere's disease     deaf left ear     Multiple sclerosis  (H)      Noninfectious ileitis      Other chronic pain      Paroxysmal atrial fibrillation (H)      Past Surgical History:   Procedure Laterality Date     APPENDECTOMY       BACK SURGERY  5/1/2015    lumbar fusion     BRONCHOSCOPY FLEXIBLE N/A 4/17/2017    Procedure: BRONCHOSCOPY FLEXIBLE;;  Surgeon: Jens Wise MD;  [facility]     C GASTROSTOMY/JEJUN TUBE      J tube for feedings     CLOSE SPIT FISTULA N/A 4/17/2017    Procedure: CLOSE SPIT FISTULA;;  Surgeon: Jens Wise MD;  [facility]     COMBINED ESOPHAGOSCOPY, GASTROSCOPY, DUODENOSCOPY (EGD), REMOVE ESOPHAGEAL STENT N/A 8/26/2016    Procedure: COMBINED ESOPHAGOSCOPY, GASTROSCOPY, DUODENOSCOPY (EGD), REMOVE ESOPHAGEAL STENT;  Surgeon: Jens Wise MD;  [facility]     COMBINED ESOPHAGOSCOPY, GASTROSCOPY, DUODENOSCOPY (EGD), REMOVE ESOPHAGEAL STENT N/A 2/12/2018    Procedure: COMBINED ESOPHAGOSCOPY, GASTROSCOPY, DUODENOSCOPY (EGD), REMOVE ESOPHAGEAL STENT;  Esophagogastroduodenoscopy With Stent Removal;  Surgeon: Jens Wise MD;  [facility]     COMBINED ESOPHAGOSCOPY, GASTROSCOPY, DUODENOSCOPY (EGD), REPLACE ESOPHAGEAL STENT N/A 8/25/2017    Procedure: COMBINED ESOPHAGOSCOPY, GASTROSCOPY, DUODENOSCOPY (EGD), REPLACE ESOPHAGEAL STENT;;  Surgeon: Jens Wise MD;  [facility]     COMBINED ESOPHAGOSCOPY, GASTROSCOPY, DUODENOSCOPY (EGD), REPLACE ESOPHAGEAL STENT N/A 9/15/2017    Procedure: COMBINED ESOPHAGOSCOPY, GASTROSCOPY, DUODENOSCOPY (EGD), REPLACE ESOPHAGEAL STENT;  Upper Endoscopy with Stent Exchange, Cauterization of Tracheosophageal Fistula, Cauterization of Chest Wound   ;  Surgeon: Jens Wise MD;  [facility]     COMBINED ESOPHAGOSCOPY, GASTROSCOPY, DUODENOSCOPY (EGD), REPLACE ESOPHAGEAL STENT N/A 10/20/2017    Procedure: COMBINED ESOPHAGOSCOPY, GASTROSCOPY, DUODENOSCOPY (EGD), REPLACE ESOPHAGEAL STENT;  Upper Endoscopy with Stent Exchange,  Cauterization Tracheosphageal Fistula Tract;  Surgeon: Nalini Barreto MD;  [facility]     COMBINED ESOPHAGOSCOPY, GASTROSCOPY, DUODENOSCOPY (EGD), REPLACE ESOPHAGEAL STENT N/A 11/3/2017    Procedure: COMBINED ESOPHAGOSCOPY, GASTROSCOPY, DUODENOSCOPY (EGD), REPLACE ESOPHAGEAL STENT;  Esophagogastroduodenoscopy, Stent Revision, Cauterization Of Traceoesophageal Fistula and stent exchange;  Surgeon: Nalini Barreto MD;  [facility]     COMBINED ESOPHAGOSCOPY, GASTROSCOPY, DUODENOSCOPY (EGD), REPLACE ESOPHAGEAL STENT N/A 11/17/2017    Procedure: COMBINED ESOPHAGOSCOPY, GASTROSCOPY, DUODENOSCOPY (EGD), REPLACE ESOPHAGEAL STENT;  Esophagogastroduodenoscopy, Esophogeal Stent Removal, Esophogeal Stent Placement (23x100 EndoMAXX), Cauterization Of Fistula Tract;  Surgeon: Nalini Barreto MD;  [facility]     CREATE SPIT FISTULA N/A 1/9/2017    Procedure: CREATE SPIT FISTULA;  Surgeon: Jens Wise MD;  [facility]     ESOPHAGECTOMY N/A 1/9/2017    Procedure: ESOPHAGECTOMY;  Surgeon: Jens Wise MD;  [facility]     ESOPHAGOSCOPY, GASTROSCOPY, DUODENOSCOPY (EGD), COMBINED N/A 4/18/2016    Procedure: COMBINED ESOPHAGOSCOPY, GASTROSCOPY, DUODENOSCOPY (EGD);  Surgeon: Alexis Barraza MD;  [facility]     ESOPHAGOSCOPY, GASTROSCOPY, DUODENOSCOPY (EGD), COMBINED N/A 4/25/2016    Procedure: COMBINED ESOPHAGOSCOPY, GASTROSCOPY, DUODENOSCOPY (EGD);  Surgeon: Alexis Barraza MD;  [facility]     ESOPHAGOSCOPY, GASTROSCOPY, DUODENOSCOPY (EGD), COMBINED N/A 5/4/2016    Procedure: COMBINED ESOPHAGOSCOPY, GASTROSCOPY, DUODENOSCOPY (EGD);  Surgeon: Alexis Barraza MD;  [facility]     ESOPHAGOSCOPY, GASTROSCOPY, DUODENOSCOPY (EGD), COMBINED N/A 5/18/2016    Procedure: COMBINED ESOPHAGOSCOPY, GASTROSCOPY, DUODENOSCOPY (EGD);  Surgeon: Alexis Barraza MD;  [facility]     ESOPHAGOSCOPY, GASTROSCOPY, DUODENOSCOPY (EGD), COMBINED N/A  6/22/2016    Procedure: COMBINED ESOPHAGOSCOPY, GASTROSCOPY, DUODENOSCOPY (EGD);  Surgeon: Alexis Barraza MD;  [facility]     ESOPHAGOSCOPY, GASTROSCOPY, DUODENOSCOPY (EGD), COMBINED N/A 7/12/2016    Procedure: COMBINED ESOPHAGOSCOPY, GASTROSCOPY, DUODENOSCOPY (EGD);  Surgeon: Jens Wise MD;  [facility]     ESOPHAGOSCOPY, GASTROSCOPY, DUODENOSCOPY (EGD), COMBINED N/A 4/21/2017    Procedure: COMBINED ESOPHAGOSCOPY, GASTROSCOPY, DUODENOSCOPY (EGD);  Esophagogastroduodenoscopy, Pharyngostomy Tube Placement ;  Surgeon: Jens Wise MD;  [facility]     ESOPHAGOSCOPY, GASTROSCOPY, DUODENOSCOPY (EGD), COMBINED N/A 5/19/2017    Procedure: COMBINED ESOPHAGOSCOPY, GASTROSCOPY, DUODENOSCOPY (EGD);  Upper Endoscopy, Irrigation and Debridement of Chest Wound ;  Surgeon: Nalini Barreto MD;  [facility]     ESOPHAGOSCOPY, GASTROSCOPY, DUODENOSCOPY (EGD), COMBINED N/A 5/23/2017    Procedure: COMBINED ESOPHAGOSCOPY, GASTROSCOPY, DUODENOSCOPY (EGD);;  Surgeon: Nalini Barreto MD;  [facility]     ESOPHAGOSCOPY, GASTROSCOPY, DUODENOSCOPY (EGD), COMBINED N/A 6/27/2017    Procedure: COMBINED ESOPHAGOSCOPY, GASTROSCOPY, DUODENOSCOPY (EGD);  Esophagogastroduodenoscopy With Removal And Replacement Of Esophageal Stent exchange. Exchange of pharyngtomy tube. Chest wound dressing change;  Surgeon: Nalini Barreto MD;  [facility]     ESOPHAGOSCOPY, GASTROSCOPY, DUODENOSCOPY (EGD), COMBINED N/A 8/4/2017    Procedure: COMBINED ESOPHAGOSCOPY, GASTROSCOPY, DUODENOSCOPY (EGD);  Esophagogastroduodenoscopy, Stent Removal and Stent Replacement. Dressing Change ;  Surgeon: Nalini Barreto MD;  [facility]     ESOPHAGOSCOPY, GASTROSCOPY, DUODENOSCOPY (EGD), COMBINED N/A 8/25/2017    Procedure: COMBINED ESOPHAGOSCOPY, GASTROSCOPY, DUODENOSCOPY (EGD);  Esophagogastroduodenoscopy,  Stent Revision,  Cauterization;  Surgeon: Jens Wise MD;  Location:  OR      ESOPHAGOSCOPY, GASTROSCOPY, DUODENOSCOPY (EGD), COMBINED N/A 10/2/2017    Procedure: COMBINED ESOPHAGOSCOPY, GASTROSCOPY, DUODENOSCOPY (EGD);  Esophagogastroduodenostomy, Replacement of Esophageal Stent, Cauterization of Tracheosophageal Fistula anc chest wall,   C-arm;  Surgeon: Nalini Barreto MD;  [facility]     ESOPHAGOSCOPY, GASTROSCOPY, DUODENOSCOPY (EGD), DILATATION, COMBINED N/A 6/29/2016    Procedure: COMBINED ESOPHAGOSCOPY, GASTROSCOPY, DUODENOSCOPY (EGD), DILATATION;  Surgeon: Jens Wise MD;  [facility]     EXPLORE NECK N/A 5/19/2017    Procedure: EXPLORE NECK;;  Surgeon: Nalini Barreto MD;  [facility]     HC UGI ENDOSCOPY W TRANSENDOSCOPIC STENT PLACEMENT N/A 7/12/2016    Procedure: COMBINED ESOPHAGOSCOPY, GASTROSCOPY, DUODENOSCOPY (EGD), PLACE TRANSENDOSCOPIC ESOPHAGEAL STENT;  Surgeon: Jens Wise MD;  [facility]     HC UGI ENDOSCOPY W TRANSENDOSCOPIC STENT PLACEMENT N/A 7/22/2016    Procedure: COMBINED ESOPHAGOSCOPY, GASTROSCOPY, DUODENOSCOPY (EGD), PLACE TRANSENDOSCOPIC ESOPHAGEAL STENT;  Surgeon: Jens Wise MD;  [facility]     INCISION AND DRAINAGE CHEST WASHOUT, COMBINED N/A 5/27/2017    Procedure: COMBINED INCISION AND DRAINAGE CHEST WASHOUT;  Chest washout;  Surgeon: Nalini Barreto MD;  [facility]     INCISION AND DRAINAGE CHEST WASHOUT, COMBINED N/A 5/28/2017    Procedure: COMBINED INCISION AND DRAINAGE CHEST WASHOUT;  Chest washout;  Surgeon: Nalini Barreto MD;  [facility]     INCISION AND DRAINAGE CHEST WASHOUT, COMBINED N/A 6/9/2017    Procedure: COMBINED INCISION AND DRAINAGE CHEST WASHOUT;  Chest washout and dressing change, Esophaogastroduodenoscopy;  Surgeon: Jens Wise MD;  [facility]     INCISION AND DRAINAGE CHEST WASHOUT, COMBINED N/A 7/17/2017    Procedure: COMBINED INCISION AND DRAINAGE CHEST WASHOUT;  Incision and Drainage of right Chest Wound, Esophagogastroduodenoscopy with stent  exchange. pharangostomy tube revision;  Surgeon: Jens Wise MD;  [facility]     IRRIGATION AND DEBRIDEMENT CHEST WASHOUT, COMBINED N/A 6/29/2016    Procedure: COMBINED IRRIGATION AND DEBRIDEMENT CHEST WASHOUT;  Surgeon: Jens Wise MD;  [facility]     IRRIGATION AND DEBRIDEMENT CHEST WASHOUT, COMBINED N/A 5/23/2017    Procedure: COMBINED IRRIGATION AND DEBRIDEMENT CHEST WASHOUT;  COMBINED IRRIGATION AND DEBRIDEMENT CHEST WASHOUT,COMBINED ESOPHAGOSCOPY, GASTROSCOPY, DUODENOSCOPY (EGD) ;  Surgeon: Nalini Barreto MD;  [facility]     IRRIGATION AND DEBRIDEMENT CHEST WASHOUT, COMBINED N/A 5/24/2017    Procedure: COMBINED IRRIGATION AND DEBRIDEMENT CHEST WASHOUT;  Chest wound Washout and Dressing Change;  Surgeon: Nalini Barreto MD;  [facility]     IRRIGATION AND DEBRIDEMENT CHEST WASHOUT, COMBINED N/A 5/25/2017    Procedure: COMBINED IRRIGATION AND DEBRIDEMENT CHEST WASHOUT;  Irrigation and Debridement Chest Washout and dressing change;  Surgeon: Nalini Barreto MD;  [facility]     IRRIGATION AND DEBRIDEMENT CHEST WASHOUT, COMBINED N/A 5/26/2017    Procedure: COMBINED IRRIGATION AND DEBRIDEMENT CHEST WASHOUT;  Irrigation and Debridement Chest Washout with  dressing change;  Surgeon: Nalini Barreto MD;  [facility]     IRRIGATION AND DEBRIDEMENT CHEST WASHOUT, COMBINED N/A 5/30/2017    Procedure: COMBINED IRRIGATION AND DEBRIDEMENT CHEST WASHOUT;  Irrigation and Debridement Chest Washout , PICC line dressing change;  Surgeon: Nalini Barreto MD;  [facility]     IRRIGATION AND DEBRIDEMENT CHEST WASHOUT, COMBINED N/A 5/31/2017    Procedure: COMBINED IRRIGATION AND DEBRIDEMENT CHEST WASHOUT;  Irrigation and Debridement Chest Washout ;  Surgeon: Nalini Barreto MD;  [facility]     IRRIGATION AND DEBRIDEMENT CHEST WASHOUT, COMBINED N/A 6/1/2017    Procedure: COMBINED IRRIGATION AND DEBRIDEMENT CHEST WASHOUT;  Chest Wound Irrigation And Debridement with  dressing change ;  Surgeon: Nalini Barreto MD;  [facility]     IRRIGATION AND DEBRIDEMENT CHEST WASHOUT, COMBINED N/A 6/4/2017    Procedure: COMBINED IRRIGATION AND DEBRIDEMENT CHEST WASHOUT;  COMBINED IRRIGATION AND DEBRIDEMENT CHEST WASHOUT, Esophagoscopy, Gastroscopy, Duodenoscopy (EGD) place transendoscopic esophageal stent,   Pharyngostomy and chest wall tube exchange  C-Arm;  Surgeon: Jens Wise MD;  [facility]     IRRIGATION AND DEBRIDEMENT CHEST WASHOUT, COMBINED N/A 6/2/2017    Procedure: COMBINED IRRIGATION AND DEBRIDEMENT CHEST WASHOUT;  Chest Wound Irrigation and Debridement, Dressing Change;  Surgeon: Nalini Barreto MD;  [facility]     LAPAROSCOPIC ASSISTED INSERTION TUBE JEJUNOSTOMY N/A 4/17/2017    Procedure: LAPAROSCOPIC ASSISTED INSERTION TUBE JEJUNOSTOMY;;  Surgeon: Jens Wise MD;  [facility]     LAPAROSCOPY DIAGNOSTIC (GENERAL) N/A 1/9/2017    Procedure: LAPAROSCOPY DIAGNOSTIC (GENERAL);  Surgeon: Jens Wise MD;  [facility]     LAPAROSCOPY DIAGNOSTIC (GENERAL) N/A 4/17/2017    Procedure: LAPAROSCOPY DIAGNOSTIC (GENERAL);  Laparoscopic , Neck Dissection, Spit Fistula Takedown, Laparoscopic Jejunostomy Tube and Pharyngostomy Tube, Gastric Pull up, Upper Endoscopy(EGD)  , Flexible Bronchoscopy ,Sternotomy ;  Surgeon: Jens Wise MD;  [facility]     LUMPECTOMY BREAST Right 2007     NERVE BLOCK PERIPHERAL N/A 8/30/2016    Procedure: NERVE BLOCK PERIPHERAL;  Surgeon: GENERIC ANESTHESIA PROVIDER;  [facility]     NISSEN FUNDOPLICATION  1/2016     PHARYNGOSTOMY N/A 4/18/2016    Procedure: PHARYNGOSTOMY;  Surgeon: Alexis Barraza MD;  [facility]     PHARYNGOSTOMY N/A 4/25/2016    Procedure: PHARYNGOSTOMY;  Surgeon: Alexis Barraza MD;  [facility]     PHARYNGOSTOMY N/A 5/4/2016    Procedure: PHARYNGOSTOMY;  Surgeon: Alexis Barraza MD;  [facility]     PHARYNGOSTOMY  "N/A 6/22/2016    Procedure: PHARYNGOSTOMY;  Surgeon: Alexis Barraza MD;  [facility]     PHARYNGOSTOMY N/A 6/29/2016    Procedure: PHARYNGOSTOMY;  Surgeon: Jens Wise MD;  [facility]     PHARYNGOSTOMY N/A 4/21/2017    Procedure: PHARYNGOSTOMY;;  Surgeon: Jens Wise MD;  [facility]     PHARYNGOSTOMY Left 5/31/2017    Procedure: PHARYNGOSTOMY;;  Surgeon: Nalini Barreto MD;  [facility]     PICC INSERTION Left 8/25/2016    5fr DL BioFlo PICC, 42cm (3cm external) in the L medial brachial vein w/ tip in the SVC RA junction.     PICC INSERTION - Rewire Right 05/31/2017    5fr DL BioFlo PICC, 40cm (6cm external) in the R medial brachial vein w/ tip in the SVC RA junction.     REPAIR FISTULA TRACHEOESOPHAGEAL N/A 9/15/2017    Procedure: REPAIR FISTULA TRACHEOESOPHAGEAL;;  Surgeon: Jens Wise MD;  [facility]     THORACOTOMY Right 1998    lung infection - \"hard crust formed on lung\"     THORACOTOMY Left 1/9/2017    Procedure: THORACOTOMY;  Surgeon: Jens Wise MD;  [facility]     WRIST SURGERY       Acetaminophen (TYLENOL EXTRA STRENGTH PO)  aspirin 81 MG EC tablet  furosemide (LASIX) 40 MG tablet  gabapentin (NEURONTIN) 100 MG capsule  loperamide (IMODIUM A-D) 2 MG tablet  melatonin 3 MG tablet  MELATONIN PO  methocarbamol (ROBAXIN) 500 MG tablet  metoprolol succinate ER (TOPROL-XL) 25 MG 24 hr tablet  mirtazapine (REMERON) 15 MG tablet  omeprazole (PRILOSEC) 20 MG DR capsule  OTHER MEDICAL SUPPLIES  OTHER MEDICAL SUPPLIES  OTHER MEDICAL SUPPLIES  Pediatric Multivit-Minerals-C (CHEWABLES MULTIVITAMIN PO)  potassium chloride (KLOR-CON) 20 MEQ packet  vitamin D2 (ERGOCALCIFEROL) 17098 units (1250 mcg) capsule      Allergies   Allergen Reactions     Amoxicillin Diarrhea     Ativan [Lorazepam] Other (See Comments)     Hallucinations     Morphine Itching     Family History  Family History   Problem Relation Age of Onset     " Alzheimer Disease Mother      Cerebrovascular Disease Father         cerebral hemorrhage     Ovarian Cancer Sister      Breast Cancer Daughter      Social History   Social History     Tobacco Use     Smoking status: Former Smoker     Packs/day: 1.00     Years: 15.00     Pack years: 15.00     Types: Cigarettes     Last attempt to quit: 1998     Years since quittin.7     Smokeless tobacco: Never Used   Substance Use Topics     Alcohol use: No     Alcohol/week: 0.0 standard drinks     Drug use: No      Past medical history, past surgical history, medications, allergies, family history, and social history were reviewed with the patient. No additional pertinent items.       Review of Systems   Constitutional: Positive for appetite change (loss) and fatigue. Negative for fever.   Respiratory: Positive for shortness of breath. Negative for cough.         Positive for chest heaviness     Cardiovascular: Positive for leg swelling (bilateral). Negative for chest pain.        Positive for bilateral upper extremity swelling     Gastrointestinal: Positive for diarrhea (1x). Negative for abdominal pain.   Neurological: Positive for weakness.   All other systems reviewed and are negative.        Physical Exam   BP: 101/70  Pulse: 84  Temp: 98  F (36.7  C)  Resp: 18  Height: 152.4 cm (5')  Weight: 48.6 kg (107 lb 3.2 oz)(bed scale)  SpO2: 98 %  Physical Exam  Vitals signs and nursing note reviewed.   Constitutional:       General: She is not in acute distress.     Appearance: She is well-developed. She is not diaphoretic.   HENT:      Head: Atraumatic.      Mouth/Throat:      Pharynx: No oropharyngeal exudate.   Eyes:      General: No scleral icterus.     Pupils: Pupils are equal, round, and reactive to light.   Cardiovascular:      Rate and Rhythm: Normal rate and regular rhythm.      Heart sounds: Normal heart sounds.   Pulmonary:      Effort: No respiratory distress.      Breath sounds: Normal breath sounds.    Abdominal:      General: Bowel sounds are normal.      Palpations: Abdomen is soft.      Tenderness: There is no abdominal tenderness.   Musculoskeletal:         General: No tenderness.   Skin:     General: Skin is warm.      Findings: No rash.   Neurological:      General: No focal deficit present.      Mental Status: She is alert. She is disoriented.           ED Course       1:38 PM  The patient was seen and examined by Tosin Sanchez MD in Room ED10.   Procedures                         No results found for any visits on 09/21/20.  Medications - No data to display     Assessments & Plan (with Medical Decision Making)     75 year old female with a complex medical history significant for MI, CAD s/p stenting (2018), chronic diastolic heart failure, multiple sclerosis, breast cancer s/p lumpectomy and lymph node dissection, meniere's disease, paroxysmal afib, GERD s/p multiple esophageal surgeries, and malnutrition who presents to the Emergency Department from [facility] with shortness of breath.  Patient presentation concerning for possible aspiration pneumonia, CHF, ACS, less likely covid.  IV established, labs consent reviewed document epic remarkable for phosphorus 1.6, mag of 1.4, ionized calcium 4.0, BNP of 2000.  X-ray of the chest obtained which revealed no acute process.  Complete echocardiogram obtained here in the emergency department with interpretation pending at the time of this dictation.  Case discussed with medicine service with arrangements made for admission.  Magnesium repleted with 2 g IV piggyback here in the ED.    I have reviewed the nursing notes. I have reviewed the findings, diagnosis, plan and need for follow up with the patient.    New Prescriptions    No medications on file       Final diagnoses:   Generalized muscle weakness     I, Olivia Gold, am serving as a trained medical scribe to document services personally performed by Tosin Sanchez MD, based on the provider's  statements to me.     I, Tosin Sanchez MD, was physically present and have reviewed and verified the accuracy of this note documented by Olivia Gold.    --  Tosin Sanchez MD  [facility], [place], EMERGENCY DEPARTMENT  9/21/2020     Tosin Sanchez MD  09/21/20 1975

## 2020-09-21 NOTE — LETTER
"     Health Information Management Services               Recipient:    Canelo Cameron for [facility]      Sender:    KOTA Waldron, LGSW      Date: September 24, 2020  Patient Name:  Minerva Blanco  Routing Message:      Enclosed is the facesheet for Minerva Blanco. Pt \"very\" interested in returning to facility. Ready for discharge tomorrow, pending BP stability      The documents accompanying this notice contain confidential information belonging to the sender.  This information is intended only for the use of the individual or entity named above.  The authorized recipient of this information is prohibited from disclosing this information to any other party and is required to destroy the information after its stated need has been fulfilled, unless otherwise required by state law.      If you are not the intended recipient, you are hereby notified that any disclosure, copy, distribution or action taken in reliance on the contents of these documents is strictly prohibited.  If you have received this document in error, please return it by fax to [phone] with a note on the cover sheet explaining why you are returning it (e.g. not your patient, not your provider, etc.).  If you need further assistance, please call [employer] [place] Centralized Transcription at [phone].  Documents may also be returned by mail to [facility], , [address], [place], [place] [address].        "

## 2020-09-21 NOTE — ED NOTES
Bed: ED10  Expected date: 9/21/20  Expected time: 12:44 PM  Means of arrival: Ambulance  Comments:  [id number]    76 y/o Female    Worsening SOB  Known pneumonia

## 2020-09-21 NOTE — LETTER
Transition Communication Hand-off for Care Transitions to Next Level of Care Provider    Name: Minerva Blanco  : 1945  MRN #: [id number]  Primary Care Provider: [facility]     Primary Clinic: [address]  [facility] [address]     Reason for Hospitalization:  Generalized muscle weakness [M62.81]  Admit Date/Time: 2020 12:49 PM  Discharge Date: 20 at 4:15pm  Payor Source: Payor: MEDICARE / Plan: MEDICARE / Product Type: Medicare /            Reason for Communication Hand-off Referral: Other Pt returning to previous TCU setting from hospital    Discharge Plan:  [facility]  [address] [place] [address] [address], [place] [address]  P: [phone]     Concern for non-adherence with plan of care:   N  Discharge Needs Assessment:  Needs      Most Recent Value   Equipment Currently Used at Home  shower chair, walker, rolling, grab bar, tub/shower, grab bar, toilet, cane, straight            Follow-up specialty is recommended: Yes    Follow-up plan:    Future Appointments   Date Time Provider Department Center   2020  6:00 AM Alana Azul OTR [facility]       Any outstanding tests or procedures:        Referrals     Future Labs/Procedures    Nutrition Services Adult IP Consult     Comments:    Reason:  Malnutrition    Occupational Therapy Adult Consult     Comments:    Evaluate and treat as clinically indicated.    Reason:  Weakness.    Physical Therapy Adult Consult     Comments:    Evaluate and treat as clinically indicated.    Reason:  Generalized weakness and lymphedema          Supplies     Future Labs/Procedures    Anti-Embolism Stockings     Comments:    Bilateral below knee length.On in the morning, off at night          Key Recommendations:      KOTA Anderson    AVS/Discharge Summary is the source of truth; this is a helpful guide for improved communication of patient story

## 2020-09-22 NOTE — PROGRESS NOTES
"   09/22/20 1449   Quick Adds   Type of Visit Initial PT Evaluation   Living Environment   Lives With alone   Living Arrangements independent living facility   Home Accessibility no concerns   Living Environment Comment pt lives alone in IND living facility, elevator access   Self-Care   Usual Activity Tolerance moderate   Current Activity Tolerance fair   Regular Exercise Yes   Activity/Exercise Type other (see comments)  (TCU rehab)   Equipment Currently Used at Home cane, straight;grab bar, toilet;grab bar, tub/shower;shower chair;walker, rolling  (4WW)   Activity/Exercise/Self-Care Comment pt in and out of TCU recently. At baseline, unlimited community ambulator with 4WW, goes for walks for activity.   Functional Level Prior   Ambulation 1-->assistive equipment   Transferring 1-->assistive equipment   Toileting 1-->assistive equipment   Bathing 1-->assistive equipment   Communication 0-->understands/communicates without difficulty   Swallowing 0-->swallows foods/liquids without difficulty   Cognition 0 - no cognition issues reported   Fall history within last six months yes   Number of times patient has fallen within last six months 3   Which of the above functional risks had a recent onset or change? ambulation;transferring;toileting;bathing   Prior Functional Level Comment Katey with 4WW at baseline   General Information   Onset of Illness/Injury or Date of Surgery - Date 09/21/20   Referring Physician Caterina Garcia PA-C    Patient/Family Goals Statement \"get stronger and return home\"   Pertinent History of Current Problem (include personal factors and/or comorbidities that impact the POC) per chart review, \"Minerva Blanco is a 75 year old female admitted on 9/21/2020. She has a past medical history significant for MI, CAD s/p stent (2018), chronic HFpEF, MS, breast cancer s/p lumpectomy with lymph node, meniere's disease, paroxysmal afib, GERD s/p multiple esophageal surgeries who presented with " "worsening dyspnea and weakness. She was recently started on antibiotics for presumed LLL aspiration pneumonia, and noted to have hypoxia requiring supplemental O2 at TCU.\"   Precautions/Limitations fall precautions;oxygen therapy device and L/min   General Observations pleasant, cooperative, eager for therapy   General Info Comments activity:  up with assist   Cognitive Status Examination   Orientation orientation to person, place and time   Level of Consciousness alert   Follows Commands and Answers Questions 100% of the time   Personal Safety and Judgment intact   Memory intact   Pain Assessment   Patient Currently in Pain No   Posture    Posture Protracted shoulders;Forward head position   Range of Motion (ROM)   ROM Comment WFL, gross limitations consistent with age and PLOF   Strength   Strength Comments 4/5 B LE strength   Bed Mobility   Bed Mobility Comments Nathaniel supine<>sit with rail use   Transfer Skills   Transfer Comments CGA sit<>stand to FWW from bed   Gait   Gait Comments pt amb with FWW to door and back (tx) CGA, slow but steady, limited by SOB.   Balance   Balance Comments needs UE support in standing to maintain balance, sits EOB IND   Sensory Examination   Sensory Perception Comments baseline peripheral neuropathy, denies acute sensory changes   General Therapy Interventions   Planned Therapy Interventions balance training;bed mobility training;gait training;neuromuscular re-education;ROM;strengthening;transfer training;risk factor education;home program guidelines;progressive activity/exercise   Clinical Impression   Criteria for Skilled Therapeutic Intervention yes, treatment indicated   PT Diagnosis impaired functional mobility   Influenced by the following impairments reduced activity tolerance, LE weakness, impaired balance   Functional limitations due to impairments transfers, bed mobility, gait, return to community level mobility/activity   Clinical Presentation Evolving/Changing   Clinical " "Presentation Rationale PMH, clinician impression   Clinical Decision Making (Complexity) Moderate complexity   Therapy Frequency 4x/week   Predicted Duration of Therapy Intervention (days/wks) 2-4 days   Anticipated Discharge Disposition Transitional Care Facility   Risk & Benefits of therapy have been explained Yes   Patient, Family & other staff in agreement with plan of care Yes   Clinical Impression Comments see POC note   [facility] TM \"6 Clicks\"   2016, Trustees of [facility], under license to [employer].  All rights reserved.   6 Clicks Short Forms Basic Mobility Inpatient Short Form   [facility]  \"6 Clicks\" V.2 Basic Mobility Inpatient Short Form   1. Turning from your back to your side while in a flat bed without using bedrails? 3 - A Little   2. Moving from lying on your back to sitting on the side of a flat bed without using bedrails? 3 - A Little   3. Moving to and from a bed to a chair (including a wheelchair)? 3 - A Little   4. Standing up from a chair using your arms (e.g., wheelchair, or bedside chair)? 3 - A Little   5. To walk in hospital room? 3 - A Little   6. Climbing 3-5 steps with a railing? 2 - A Lot   Basic Mobility Raw Score (Score out of 24.Lower scores equate to lower levels of function) 17   Total Evaluation Time   Total Evaluation Time (Minutes) 10     "

## 2020-09-22 NOTE — PLAN OF CARE
/72 (BP Location: Left leg)   Pulse 80   Temp 97.4  F (36.3  C) (Oral)   Resp 16   Ht 1.524 m (5')   Wt 46.9 kg (103 lb 6.3 oz)   SpO2 95%   BMI 20.19 kg/m      Time: 5205-0136     Reason for admission: Generalized muscle weakness and SOB  Activity: Up Ax1 w walker and gait belt to [facility], generalized weakness   Pain: Pt reported generalized pain upon arrival to unit so Toradol administered w little relief, Zanaflex also given at bedtime  Neuro:  A&O x4  Cardiac: WDL, significant cardiac history on tele  Respiratory: Pt reports dyspnea on exertion, lungs are diminished w fine crackles  GI/: Pt had one tea-colored void and no BM. She reports diarrhea prior to coming in.   Diet:  Cardiac diet, pt ate 50% of curtesy meal, NPO at midnight  Lines: Midline infusing IV abx, pt states her arm w midline is painful from insertion  Skin/Wounds: Pt has scattered bruises and scabs throughout. She has a suspected pressure sore on cervical spine, her left inner arm is swollen and bruised, right arm is edematous and weeping, sacrum is reddened and bilateral second toes have pressure injuries present. Pt skin is dry and flaky, she would benefit from WOC consult.   Labs: COVID-19 pending     Plan: Continue to monitor and follow POC, pt to have MRI tomorrow for further diagnostic workup.

## 2020-09-22 NOTE — PROGRESS NOTES
"CLINICAL NUTRITION SERVICES - ASSESSMENT NOTE     Nutrition Prescription    RECOMMENDATIONS FOR MDs/PROVIDERS TO ORDER:  Agree with kcal counts. Goal for patient to demonstrate ability to consistently meet at least 75% low end estimated nutrition needs (945 kcal, 40 g pro/day). However, given recent admissions for FTT, weakness, and nutritional decline suspect patient would benefit from nutrition support.  --> If/when nutrition support becomes nutrition POC, please consult RD \"Registered Dietitian to Assess and Order TF per Medical Nutrition Therapy Protocol\" -or- \"Pharmacy/Nutrition to Start and Manage PN\". Recommend enteral nutrition vs parenteral nutrition pending GI status.     Lyte (K+, Mg++, Phos) monitoring/replacement per primary team. Phos level noted to be 1.8 today. Currently has PRN standard replacement orders for Mg++ and Phos. Could consider upgrading to high replacement protocols if appropriate. Recommend monitor closely for refeeding syndrome, especially if/when PO intakes begin to increase.    Could consider use of appetite stimulant such as remeron, megace, or marinol as medically appropriate if patient continues to have low appetite.    Malnutrition Status:    Severe malnutrition in the context of chronic illness.     Recommendations already ordered by Registered Dietitian (RD):  Supplements: trial of Ensure Plant Based, sugar-free Gelatein Plus, pt may order PRN    Future/Additional Recommendations:  Monitor supp tolerance/schedule per pt's preference.    If enteral nutrition becomes POC, recommend post pyloric FT. Consider the following regimen:   TwoCal HN @ 30 mL/hr (720 mL/day) to provide 1440 kcals (34 kcal/kg/day), 60 g PRO (1.4 g/kg/day), 504 mL H2O, 158 g CHO and 4 g Fiber daily.  - Initiate @ 10 mL/hr and advance by 10 mL q10hr as tolerated  - Do not start or advance unless lytes (Mg++/K+) >/= WNL and phos>1.9   - 30 mL q4hr fluid flushes for tube patency. Additional fluids and/or " "adjustments per MD.    - Multivitamin/mineral (15 mL/day via FT if not tolerating PO MVI) to help ensure micronutrient needs being met with suspected hypermetabolic demands and potential interruptions to TF infusions.       REASON FOR ASSESSMENT  Minerva Blanco is a/an 75 year old female assessed by the dietitian for Admission Nutrition Risk Screen for reduced oral intake over the last month and Provider Order - \"poor nutritional status, failure to thrive\".    Chart Review:  PMH significant for protein-calorie malnutrition, IBS, MS, MI, CAD s/p ADOLFO 2018, chronic HFpEF, pAfib not on anticoagulation, MS, breast cancer s/p lumpectomy and lymph node resection, GERD s/p multiple esophageal surgeries (including esophagectomy) with multiple recent hospitalization due to weakness, falls, malnutrition and FTT.    Admitted from TCU 9/21/20 with acute hypoxic respiratory failure, concerning for aspiration pneumonia. Diagnosed with LLL pneumonia 9/17 and started on flagyl, ceftin. Pt also with diarrhea, loss of appetite which began 1 week ago.      Noted to have hyperbilirubinemia and elevated alk phos. During previous admission 8/17 there was c/f alcohol use. Advised to abstain from alcohol and discontinue tizanidine due to concerns for underlying liver disease; however still on tizanidine at bedtime. Per note from TCU; PIPPA 0.025, drinks 1-2 glasses of wine every night.    NUTRITION HISTORY  Per H&P, \"Overall poor nutritional status. Has had extensive GI work-up in the past for nutritional deficiencies and chronic diarrhea\"... \"Most recently admitted to [facility] for failure to thrive, hypotension. Concern at that time related to her downplaying her alcohol use and not eating\".     Per Care Everywhere discharge summary on 9/14/20 \"Pt has chronic severe reflux due to her multiple and extensive esophageal surgeries. She is always regurgitating food and is afraid to eat. This, along with alcohol use, are likely contributing to her " "poor PO in take\". RD saw patient during this admission. Pt reported \"eating well PTA - states she has been trying to eat more since her last hospital discharge. Pt reports usual intake is 3 meals per day which she reports doing for a few months now. Pt reports getting most of her food through [employer] and has ham and cheese sandwiches, soups, spaghetti, and hamburgers. Pt reports reason for current poor po intake is due to acid reflux, and reported frequent emesis \"like spit up\" and frequent burping. We discussed benefits of smaller, more frequent meals\".     PTA medications include Folic acid 1 mg, K-Phos 250 mg, Thiamine 100 mg, Culturelle BID, Potassium chloride 20 mEq BID, Ergocalciferol 1250 mcg on Sundays, and Lasix.     No food allergies noted per chart. Patient is known to Clinical Nutrition service. In 2016, pt was started on TPN then transitioned to TFs d/t esophageal perf. Noted to have ongoing diarrhea despite formula changes, fiber modifications, probiotics, etc. Diarrhea was through to be 2/2 chronic antibiotc use. TPN resumed in 01/2017 s/p esophagectomy + spit fistula then transition back onto enteral nutrition via G tube. Continued to have loose stools. Switched to semi-elemental formula to see if this would improve absorption, did not seem to help. RD recommending team to consider TPN. Was resumed on TPN until patient was able to tolerate goal EN 5/28/17- 7/5/17.    More recently, has been assessed twice by IP RDs in 08/2020 at which time nutrition interventions including appetite stimulant and enteral nutrition were recommended.     Spoke with Minerva today via in-room phone. She reports no known food allergies or dietary restrictions, however she reports that vegetables and high fiber foods tend to exacerbate her reflux and diarrhea symptoms. She reports that PTA she has had poor appetite x 1.5 months. During this time she has been eating ~2 meals/day. Reports that she has tried Boost and " "Ensure before, but feels that these exacerbate her diarrhea, she suspects d/t sugar content. Attributes diarrhea to antibiotics.    CURRENT NUTRITION ORDERS  Diet: High Kcal/High Protein (advanced this AM)   Intake/Tolerance: 50% of 1 meal on 9/21 per flowsheets.     GI:   - Per intake/output, no BM yet this admission. Per RN note, pt reports diarrhea PTA.   - Abdomen noted to be soft and non-distended with normoactive bowel sounds. Slight tenderness in RUQ.   - History of IBS and diarrhea. C diff negative on Friday per pt. Only happens when she eats per H&P.    LABS  Labs reviewed  - Na+ 142 (WNL)  - K+ 4.0 (WNL)  - Mg++ 2.7 (H)  - Phos 1.8 (L)  - Glucose Trends 71 <- 85  - Urinary Ketones 10 on 9/21 - can indicate poor nutrition intake  - T Bili 1.5 (H)  - Alk Phos 191 (H)  - Ionized Calcium 4.3 (L)  - Pre albumin - last checked in 2017    Renal:   - BUN 4 (L) - can indicate inadequate protein intake  - Cr 0.64 (WNL)    MEDICATIONS  Medications reviewed  - Folic acid 1 mg  - Lasix BID  - Culturell BID  - Thera-Vit  - Omeprazole TID before meals  - Potassium chloride 20 mEq BID  - Thiamine 100 mg  - Ergocalciferol 50,000 units weekly  - Loperamide PRN  - Mg++, Phos replacement PRN per standard replacement protocol    ANTHROPOMETRICS  Height: 152.4 cm (5' 0\")  Most Recent Weight: 46.9 kg (103 lb 6.3 oz)    IBW: 45 kg (104% IBW)  BMI: Normal BMI  Weight History:   Wt Readings from Last 15 Encounters:   09/21/20 46.9 kg (103 lb 6.3 oz)   08/20/20 44.8 kg (98 lb 11.2 oz)   08/04/20 43.7 kg (96 lb 4.8 oz)   06/23/19 48.9 kg (107 lb 12.8 oz)   06/17/19 46.7 kg (103 lb)   06/12/19 46.4 kg (102 lb 6.4 oz)   06/05/19 49.2 kg (108 lb 8 oz)   05/28/19 50.2 kg (110 lb 9.6 oz)   05/22/19 47.7 kg (105 lb 3.2 oz)   05/18/19 47.4 kg (104 lb 9.6 oz)   05/13/19 48.1 kg (106 lb)   05/13/19 48.4 kg (106 lb 9.6 oz)   05/09/19 50.2 kg (110 lb 9.6 oz)   05/08/19 51.2 kg (112 lb 12.8 oz)   05/23/18 43.5 kg (95 lb 14.4 oz)   Suspect " current weight is elevated r/t fluid. Minerva reports UBW ~ 92 lbs.    Dosing Weight: 42 kg (actual) - based on lowest recent documented wt (suspect more representative of dry weight) (41.7 kg on 8/17/20) and IBW of 45 kg    ASSESSED NUTRITION NEEDS  Estimated Energy Needs: 1260 - 1470 kcals/day (30 - 35 kcals/kg )  Justification: Repletion  Estimated Protein Needs: 50 - 63 grams protein/day (1.2 - 1.5 grams of pro/kg)  Justification: Increased needs and Repletion  Estimated Fluid Needs: Per provider pending fluid status    PHYSICAL FINDINGS  See malnutrition section below.  Skin: per chart  - Multiple scabs on extremities.  - Black tissue on toes on bilateral feet.  - Blanchable redness on sparks, back of neck, and heels.    MALNUTRITION  % Intake: </=75% for >/= 1 month (severe)  % Weight Loss: Unable to assess d/t confounding fluid  Subcutaneous Fat Loss: Unable to assess  Muscle Loss: Unable to assess  Fluid Accumulation/Edema: Severe (Anasarca, 4+ pitting edema to BLE to mid thigh per chart)  Malnutrition Diagnosis: Severe malnutrition in the context of chronic illness.     NUTRITION DIAGNOSIS  Inadequate oral intake related to lack of appetite as evidenced by pt report, presence of urinary ketones, and low BUN.       INTERVENTIONS  Implementation  Nutrition Education: Provided education on role of RD and nutrition plan. Provided education for strategies to optimize PO intakes, encouraged small, frequent snacks/meals all with source of protein and supplements as tolerated.    Offered room service with assist to help with menu planning but Minerva declined, she is comfortable calling kitchen to order her own meals.     Medical food supplement therapy - will send trial of low CHO supplements.     Goals  Total avg nutritional intake to meet a minimum of 30 kcal/kg and 1.2 g PRO/kg daily (per dosing wt 42 kg).     Monitoring/Evaluation  Progress toward goals will be monitored and evaluated per protocol.    Noel  Karen, MS, RD, LD  Pager [phone]

## 2020-09-22 NOTE — PROGRESS NOTES
09/22/20 1358   General Information   Onset Date 09/21/20   Start of Care Date 09/22/20   Referring Physician Lula Roberts PA-C    Patient Profile Review/OT: Additional Occupational Profile Info See Profile for full history and prior level of function   Patient/Family Goals Statement Pt did not state   Swallowing Evaluation Bedside swallow evaluation   Behaviorial Observations WFL (within functional limits)   Mode of current nutrition Oral diet   Type of oral diet Regular;Thin liquid   Respiratory Status Room air   Comments Minerva Blanco is a 75 year old female admitted on 9/21/2020. She has a past medical history significant for MI, CAD s/p stent (2018), chronic HFpEF, MS, breast cancer s/p lumpectomy with lymph node, meniere's disease, paroxysmal afib, GERD s/p multiple esophageal surgeries who presented with worsening dyspnea and weakness. She was recently started on antibiotics for presumed LLL aspiration pneumonia, and noted to have hypoxia requiring supplemental O2 at TCU. Pt seen by  caseload in 2017 with video swallow study with no evidence of aspiration or penetration. Pt reports occasional sticking of large pills at level of mid chest. Pt does have a hx of esophageal strictures. Clinical swallow eval completed per MD orders to further assess oropharyngeal swallow function.    Clinical Swallow Evaluation   Oral Musculature generally intact   Structural Abnormalities none present   Dentition present and adequate   Mucosal Quality adequate   Mandibular Strength and Mobility intact   Oral Labial Strength and Mobility WFL   Lingual Strength and Mobility WFL   Velar Elevation intact   Buccal Strength and Mobility intact   Laryngeal Function Cough;Throat clear;Swallow;Voicing initiated   Additional Documentation Yes   Clinical Swallow Eval: Thin Liquid Texture Trial   Mode of Presentation, Thin Liquids self-fed;straw   Volume of Liquid or Food Presented 5 oz   Oral Phase of Swallow WFL   Pharyngeal  Phase of Swallow intact   Diagnostic Statement Pt tolerated thin liquids via straw with no overt s/sx of aspiration    Clinical Swallow Eval: Solid Food Texture Trial   Mode of Presentation, Solid self-fed   Volume of Solid Food Presented 4 tbsp   Oral Phase, Solid other (see comments)  (prolonged but functional time for mastication)   Pharyngeal Phase, Solid intact   Diagnostic Statement No overt s/sx of aspiration. Pt required mildly prolonged but functional time for mastication on regular solid textures   VFSS Evaluation   VFSS Additional Documentation No   FEES Evaluation   Additional Documentation No   Swallow Compensations   Swallow Compensations Alternate viscosity of consistencies;Pacing;Reduce amounts   Results Oral difficulties only   Esophageal Phase of Swallow   Patient reports or presents with symptoms of esophageal dysphagia Yes   Esophageal comments Pt with hx of esophageal strictures; educated pt on GERD precautions   General Therapy Interventions   Planned Therapy Interventions Dysphagia Treatment   Dysphagia treatment Compensatory strategies for swallowing;Instruction of safe swallow strategies   Swallow Eval: Clinical Impressions   Skilled Criteria for Therapy Intervention Current level of function same as previous level of function   Functional Assessment Scale (FAS) 6   Treatment Diagnosis Oropharyngeal swallow WFL   Diet texture recommendations Regular diet;Thin liquids   Recommended Feeding/Eating Techniques alternate between small bites and sips of food/liquid;check mouth frequently for oral residue/pocketing;hard swallow w/ each bite or sip;maintain upright posture during/after eating for 30 mins;small sips/bites   Demonstrates Need for Referral to Another Service physical therapy;respiratory therapy;occupational therapy;social work   Therapy Frequency   (x1 tx following eval)   Anticipated Discharge Disposition   (defer to PT/OT)   Risks and Benefits of Treatment have been explained. Yes    Patient, family and/or staff in agreement with Plan of Care Yes   Clinical Impression Comments Clinical swallow eval completed per MD orders. Pt's oropharyngeal swallow is WFL. Pt reports occasional globus sensation at level of mid chest with larger pills. Pt tolerated thin liquids via straw and regular solid textures with no overt s/sx of aspiration. Pt required mildly prolonged but functional time for mastication on regular solid textures. Recommend pt continue regular textures and thin liquids. Pt should follow GERD precautions: sit fully upright for all PO, eat smaller, more frequent meals, remain upright 30-60 minutes after PO intake, and avoid PO 2 hours before bed. Pt verbalized good understanding. Suspect pt is at baseline oropharyngeal swallow function; no further ST needs indicated.    Total Evaluation Time   Total Evaluation Time (Minutes) 9

## 2020-09-22 NOTE — PLAN OF CARE
Time 3794-4915     Reason for admission: Hypoxia   Vitals: Vital signs:  Temp: 95.7  F (35.4  C) Temp src: Oral BP: 111/51 Pulse: 73   Resp: 16 SpO2: 99 % O2 Device: None (Room air)   Activity: SBA  Pain: Denies pain  Neuro: A&Ox4  Cardiac: Tele NSR  Respiratory:  Infrequent dry cough. OSORIO  GI/: 1 BM w/ moderate urine output   Diet: High kcal/high protein diet; poor PO intake   Lines: Midline infusing   Wounds: large bruise on LUE. Reddened area on mid spine  Labs/imaging: Phos 1.8.       New changes this shift: MRCP this morning; results pending. RUE ultrasound negative for DVT. Phos replaced. PT/OT/speech consult. IV lasix given. COVID negative. Iso removed.      Plan: TCU when medically stable       Continue to monitor and follow POC

## 2020-09-22 NOTE — PLAN OF CARE
Discharge Planner PT   Patient plan for discharge: return to TCU  Current status: PT eval complete, tx indicated and initiated. Pt from TCU and demos LE weakness, reduced activity tolerance/SOB, impaired balance. Was ambulating at TCU and improving but recently signficant decline, unable to participate in therapies and walk to bathroom. Today she demos supine<>sit, Nathaniel, CGA sit<>stand to FWW, amb in room with FWW and CGA 2x50'. Pt on RA throughout, difficulty obtaining consistent sats but appears to desat to 88% with activity, recovers appropriately. Would encourage up to chair 3x daily and Ax1 with FWW in room.  Barriers to return to prior living situation: medical needs, current level of function  Recommendations for discharge: TCU  Rationale for recommendations: Pt currently below baseline level of function and would benefit from ongoing therapy to address the above deficits in order to progress towards PLOF and promote IND mobility.       Entered by: Delma Sage 09/22/2020 4:41 PM

## 2020-09-22 NOTE — PLAN OF CARE
/40 (BP Location: Right leg)   Pulse 67   Temp 97.3  F (36.3  C) (Oral)   Resp 15   Ht 1.524 m (5')   Wt 46.9 kg (103 lb 6.3 oz)   SpO2 93%   BMI 20.19 kg/m      Time: 5443-8093    Reason for admission: Increased SOB & weakness   Activity: A1 w/ walker. Using bedside commode.   Pain: Complains of generalized pain- PRN robaxin given.   Neuro: A&O x4.   Cardiac: WDL. Pt on tele.   Respiratory: Complains of SOB & OSORIO. Fine crackles throughout. Infrequent dry cough. Pt on RA.   GI/: Voiding spontanously. Reanna output. No BM overnight. Pt complaining of diarrhea- increased after eating.   Diet: NPO overnight.   Lines: Midline to TKO. Intermittently infused antibiotics   Wounds: Multiple scabs on extremities. Black tissue on toes on bilateral feet. Blanchable redness on sparks & back of neck. Blanchable redness on heels- elevated. Pt would benefit from WOC consult. Pullsate mattress ordered.   Labs/Imaging: Covid pending. Need to collect stool sample.     New changes this shift: Pt complaining of pain at midline site. Right arm weeping. Extremity edema noted on UE & LE. Pt remained NPO overnight.     Cross cover paged about pt BP: 98/38. Pt denies lightheadedness/ dizziness. Recheck BP: 108/ 40.     Plan:  Possible MRCP today.     Continue to monitor and follow POC

## 2020-09-22 NOTE — PLAN OF CARE
OT: Pt declined when OT attempted. Wanted OT to check back after she has eaten, pt has not ordered food yet. OT will check back as available/appropriate or reschedule for 9/23.

## 2020-09-22 NOTE — PLAN OF CARE
Discharge Planner SLP   Patient plan for discharge: none stated   Current status: Clinical swallow eval completed per MD orders. Pt's oropharyngeal swallow is WFL. Pt reports occasional globus sensation at level of mid chest with larger pills. Pt tolerated thin liquids via straw and regular solid textures with no overt s/sx of aspiration. Pt required mildly prolonged but functional time for mastication on regular solid textures. Recommend pt continue regular textures and thin liquids. Pt should follow GERD precautions: sit fully upright for all PO, eat smaller, more frequent meals, remain upright 30-60 minutes after PO intake, and avoid PO 2 hours before bed. Pt verbalized good understanding. Suspect pt is at baseline oropharyngeal swallow function; no further ST needs indicated.   Barriers to return to prior living situation: none per ST  Recommendations for discharge: defer to medical team   Rationale for recommendations: suspect pt is at baseline oropharyngeal swallow function. Will complete ST orders.        Entered by: Khushi Yoder 09/22/2020 2:09 PM

## 2020-09-22 NOTE — PROGRESS NOTES
[facility], [facility] Progress Note - Hospitalist Service, Gold 5       Date of Admission:  9/21/2020  Assessment & Plan       76 yo F with PMHx of CAD s/p ADOLFO 2018, chronic HFpEF, pAfib not on anticoagulation, MS, breast cancer s/p lumpectomy and lymph node resection, GERD s/p multiple esophageal surgeries with multiple recent hospitalization due to weakness, falls, malnutrition and failure to thrive, who presents with worsening OSORIO for the last week from TCU, admitted on 9/21 for acute hypoxic respiratory failure, concerning for aspiration pneumonia.     #Dypsnea on exertion   #Acute hypoxic respiratory failure, resolved  #Aspiration vs HCAP   Patient endorses worsening shortness of breath on exertion over the last week. Also endorses weight gain, LE edema, and orthopnea. CXR prior to admission at TCU with LLL consolidation. Patient was started on flagyl and ceftin on 9/17 and developed oxygen requirement. On this admission, patient is currently 99% on RA without any tachypnea. CXR clear. COVID 19 negative. Procal 0.18.  TTE with moderate-severe tricuspid regurgitation, unable to get true PA pressures. DDx includes aspiration pneumonia with esophageal strictures and alcohol use vs pulmonary hypertension vs valvular disease, vs hypervolemia due to anasarca.   -Continue Unasyn for aspiration PNA  -SLP   -Check urine antigen strep pneumoniae and legionella   -Sputum cultures as able  -Diureses as noted below  -IS    #Anasarca  Etiology unclear, but most likely due to underlying cirrhosis vs malnutrition vs HFpEF. Albumin low at 1.1. Imaging with small amount of ascites, pleural effusions and LE edema which is weeping. TTE with normal EF. NT-proBNP 2039. UA with only 10 protein, making nephrotic syndrome unlikely.   -Diurese with lasix 40 mg IV BID  -Daily weights  -Strict I&O  -High protein diet   -nutrition consult with calorie counts    #Transaminitis  #Coagulopathy    #Hepatitc steatosis, with concern for underlying cirrhosis   Chronically elevated Alk phos since 2017. Total bilirubin and AST/ALT mildly elevated since 8/2020. IRN elevated at 1.62. Patient states she drinks daily. MRCP today with hepatic steatosis with cirrhotic morphology, also showing chronic CBD dilation to 12mm without any obstruction or mass unchanged from 2016. High suspicion for underlying cirrhosis. Small amount of ascites on imaging, denies any abd pain, fevers, or leukocytosis to suggest SBP. No signs of HE on exam.   -Diuresis as noted above   -Hold lactulose at this time with no HE  -Will need outpatient follow up with Hepatology   -Trend CMP and INR     #Macrocytic anemia  MCV quite elevated at 122. Hemoglobin baseline 10.0s. Dropped to 8.3 this morning. Recent work up in August with Vitamin B12 1679. Folate 7.6. TSH at 1.81. No bone marrow biopsy. SPEP at [facility] with moderate hypoalbuminemia with differential including liver disease, malnutrition, renal dx, GI loss, or inflammation. Unable to find UPEP on care-everywhere.   -Trend CBC  -Consider follow up with hematology in outpatient      #Severe protein calore malnutrition   Albumin 1.1 with anasarca.   -Nutrition consult  -Calorie counts  -High protein/high calorie diet     #Electrolyte derangements   Low phosphorus, Magnesium, and calcium.   -Continue KCl 20 mEq BID  -Replete per nursing SS  -Trend Mg, phos, and K    #Failure to thrive   Progressive weakness, with multiple falls and now unable to care for self over the last month. No source of infection seen, with UA that is positive but denies any urinary symptoms. MRCP without any changes in chronic CBD dilation, and no obstruction. Patient recently had CT chest/abd/pelvis to evaluation for possible underlying malignancy which was negative for any masses. Could be dues to hypervolemia and malnutrition.  -Follow up Urine culture    -PT/OT    #GERD  #Hx of esophageal stricture  #Dysphagia   S/p  nissen fundoplication 2016 due to hiatal hernia. Endorses chronic regitation. Denies any dysphagia or odynophagia.   -Continue PTA Prilosec 20 mg TID and 20 mg at bedtime     #IBS  #Diarrhea   Chronic diarrhea, worked up by GI in the past. Worse in setting of antibiotics. Recent C. Diff negaitve.   -Continue PTA loperamide 2 mg QID PRN and probiotic    -Work up for celiac with TTG and check fecal fat     #CAD  S/p ADOLFO x2 in 2018. Denies any chest pain. Troponin negative.   -Continue ASA 81 mg daily and metoprolol XL 12.5 mg  Not on any statin    #Paroxysmal Atrial fibrillation   Not on any anticoagulation. Continue metoprolol XL 12.5 mg daily.     #Hx of MS  Hx of optic neuritis in her 30s with no hx of MS symptoms in decades. Recently evaluated by neurology [facility] and felt to be stable without any exacerbation, and recommended outpatient neurology follow up in 2 weeks with EMG and neuropathy work up.      #Hx of breast cancer s/p lumpectomy   S/p lumpectomy and lymph node resection. R>L UE edema chronically. RUE ultrasound without any DVT.        Diet: Calorie Counts  High Kcal/High Protein Diet, ADULT    DVT Prophylaxis: Enoxaparin (Lovenox) SQ  Whitt Catheter: not present  Code Status: DNR/DNI          Disposition Plan   Expected discharge: 4 - 7 days, recommended to transitional care unit once antibiotic plan established, hemoglobin stable, O2 use less than 0 liters/minute and safe disposition plan/ TCU bed available.  Entered: Lula Roberts PA-C 09/22/2020, 9:39 AM       The patient's care was discussed with the Attending Physician, Dr. Dennys George , Bedside Nurse and Patient.    Lula Roberts PA-C  Hospitalist Service, [address]  Pager: [phone]  Please see sticky note for cross cover information  ______________________________________________________________________    Interval History   Patient states she is been feeling short of breath over the  last week specifically with exertion when walking to the bathroom.  States overall she has had a decline in her health with weakness and recurrent falls for the last month with multiple hospitalizations.  Patient noticed edema in her legs and arms which started 1 month ago after getting IV fluids on admission.  Endorses a 10 pound weight gain.  Endorses orthopnea.  Denies any PND.  Denies any fevers, chills, chest pain, pleurisy, cough, nausea, vomiting, abdominal pain, dysuria, hematuria.  Has chronic diarrhea that has worsened since starting antibiotics. Denies any black or bloody stools.  Endorses chronic issues with her esophagus and has chronic regurgitation.  Denies any dysphasia or odynaphagia.  Endorses poor appetite with no desire to eat.      Patient endorses drinking 1 to 2 glasses of wine daily.  Denies any alcohol withdrawal symptoms when stopping in the past.    Prior to 1 month ago was living at home independent, and walks with a walker.    Data reviewed today: I reviewed all medications, new labs and imaging results over the last 24 hours.  Physical Exam   Vital Signs: Temp: 97.3  F (36.3  C) Temp src: Oral BP: 108/40 Pulse: 67   Resp: 15 SpO2: 93 % O2 Device: None (Room air) Oxygen Delivery: 3 LPM  Weight: 103 lbs 6.33 oz  GENERAL: Alert and oriented x 3. NAD. Pleasant and conversational  HEENT: Anicteric sclera. EOMI. Mucous membranes moist   NECK: Trachea midline. No JVD appreciated.   CARDIOVASCULAR: RRR. S1, S2. No murmurs, rubs, or gallops.   RESPIRATORY: Effort normal on NC. Bibasilar rales with remaining lungs CTA. respirations unlabored   GI: Abdomen soft, non-tender abdomen without rebound or guarding, no hepatosplenomegaly, no palpable masses, normoactive bowel sounds present  EXTREMITIES: +2 pitting edema bilaterally. RUE +2 edema > LUE +1. No calf asymmetry, erythema, or tenderness.   NEUROLOGICAL: No focal deficits. CN II-XII grossly intact. Moving all extremities symmetrically.  Non-tremulous.   SKIN: Intact. Warm and dry. No rashes or lesions.  No jaundice. Ecchymosis on dorsal aspect of upper L arm.   PSYCH: Affect appropriate       Data   Recent Labs   Lab 20  0712 20  1812 20  1408   WBC 4.0  --  7.1   HGB 8.3*  --  10.9*   *  --  123*     --  335   INR 1.60*  --   --      --  137   POTASSIUM 4.0  --  4.6   CHLORIDE 108  --  107   CO2 26  --  26   BUN 4*  --  5*   CR 0.64 0.61 0.47*   ANIONGAP 7  --  4   ANNABELLE 7.4*  --  7.7*   GLC 71  --  85   ALBUMIN 1.1*  --  1.5*   PROTTOTAL 4.0*  --  5.2*   BILITOTAL 1.5*  --  2.1*   ALKPHOS 191*  --  244*   ALT 19  --  29   AST 37  --  Canceled, Test credited   TROPI  --   --  <0.015     Recent Results (from the past 24 hour(s))   XR Chest Port 1 View    Narrative    EXAM: XR CHEST PORT 1 VW  2020 1:35 PM     HISTORY:  SOB       COMPARISON:  2020    FINDINGS:   Single frontal radiograph of the chest. Lower thoracic and lumbar  spinal instrumentation appears grossly.    The trachea is midline. The cardiomediastinal silhouette is  well-defined. The pulmonary vasculature are distinct. No acute  airspace opacity, pleural effusion or appreciable pneumothorax.    Osteopenic-appearing bones. No acute osseous abnormalities. The  included soft tissues and upper abdomen and are within normal limits.         Impression    IMPRESSION: No acute airspace opacity.     I have personally reviewed the examination and initial interpretation  and I agree with the findings.    MAYDA ESCOBAR MD   Echo Limited    Narrative    [id number]  [id number]  [id number]  [id number]^ROSELYN^VIOLETA^ISIDRA           [facility],[place]  Echocardiography Laboratory  [address] [place] [id number]     Name: FAVIAN MALONE  MRN: [id number]  : 1945  Study Date: 2020 02:56 PM  Age: 75 yrs  Gender: Female  Patient Location: [facility]  Reason For Study: SOB  Ordering Physician: ROSELYN  VIOLETA  Performed By: BELIA Salmeron     BSA: 1.4 m2  Height: 60 in  Weight: 107 lb  HR: 72  BP: 111/70 mmHg  _____________________________________________________________________________  __        Procedure  Limited Portable Echo Adult. Contrast Optison. Technically difficult study.  Patient was given 5 ml mixture of 3 ml Optison and 6 ml saline. 4 ml wasted.  _____________________________________________________________________________  __        Interpretation Summary  Global and regional left ventricular function is hyperkinetic with an EF of  65-70%.  Right ventricular function, chamber size, wall motion, and thickness are  normal.  Moderate to severe tricuspid insufficiency is present.  PA systolic pressure is at least 37 mmHg above RA pressure. PA pressure may be  underestimated due to significant TR.  This study was compared with the study from 8/2/20 .  There has been no change.  _____________________________________________________________________________  __        Left Ventricle  Global and regional left ventricular function is hyperkinetic with an EF of  65-70%. Left ventricular size is normal. Left ventricular wall thickness is  normal. Left ventricular diastolic function is not assessable. Abnormal non-  specific septal motion is present.     Right Ventricle  Right ventricular function, chamber size, wall motion, and thickness are  normal.     Atria  The atria cannot be assessed.     Mitral Valve  Trace mitral insufficiency is present.        Aortic Valve  Mild aortic insufficiency is present.     Tricuspid Valve  Moderate to severe tricuspid insufficiency is present. PA systolic pressure is  at least 37 mmHg above RA pressure. PA pressure may be underestimated due to  significant TR.     Pulmonic Valve  The pulmonic valve cannot be assessed.     Vessels  The inferior vena cava cannot be assessed.     Pericardium  No pericardial effusion is present.        Compared to Previous Study  This study  was compared with the study from 20 . There has been no change.  _____________________________________________________________________________  __  MMode/2D Measurements & Calculations     RVDd: 3.3 cm  IVSd: 0.76 cm  LVIDd: 3.4 cm  LVIDs: 2.4 cm  LVPWd: 0.74 cm  FS: 28.6 %  LV mass(C)d: 67.0 grams  LV mass(C)dI: 46.8 grams/m2     EF(MOD-bp): 70.2 %  RWT: 0.43  TAPSE: 1.7 cm        Doppler Measurements & Calculations  TR max jorge: 302.0 cm/sec  TR max P.5 mmHg        _____________________________________________________________________________  __        Report approved by: Anjelica Sim 2020 03:45 PM        Medications       ampicillin-sulbactam (UNASYN) IV  3 g Intravenous Q6H     enoxaparin ANTICOAGULANT  40 mg Subcutaneous Q24H     folic acid  1 mg Oral Daily     gabapentin  100 mg Oral At Bedtime     influenza vac high-dose quad  0.7 mL Intramuscular Prior to discharge     lactobacillus rhamnosus (GG)  1 capsule Oral BID     metoprolol succinate ER  12.5 mg Oral QAM     multivitamin, therapeutic  1 tablet Oral Daily     omeprazole  20 mg Oral TID AC     potassium chloride  20 mEq Oral BID     sodium chloride (PF)  3 mL Intracatheter Q8H     vitamin B1  100 mg Oral Daily     tiZANidine  4 mg Oral At Bedtime     venlafaxine  37.5 mg Oral Daily     [START ON 2020] vitamin D2  50,000 Units Oral Weekly

## 2020-09-23 NOTE — PROGRESS NOTES
Critical Care Services Progress Note:  I personally examined and evaluated the patient today. I formulated today s plan with the house staff team or resident(s) and agree with the findings and plan in the associated note (see separately attested resident note).   I have evaluated all laboratory values and imaging studies from the past 24 hours.  Summary of hospital course:  75F admitted 9/21 for worsening weakness/fatigue.  Transferred to the ICU night of 9/22 for hypotension.  Overnight events/pertinent findings today:  Transferred to ICU night of 9/23 for hypotension.  Never actually required pressors.    Says she has chronic diarrhea at home (irritable bowel type), otherwise no complaints here.  Denies chest pain, dizzyness. Lightheadedness now.  Says shortness of breath is improving.  Data  bp acceptable off pressors.  satting well on room air.  Labs (personally reviewed):  Lytes ok.  Mildly low K at 3.3--replete.  Creat ok 0.56.  Tbili stably elevated 1.5.  Lactate 1.3 ok.  Wbc ok 3.8.  hgb stable 7.0.  plltts 198 ok.    CXR (personally reviewed):  Persistent fine b/l patchy infitlrates  Echo without defect to explain hypotension  MRCP shows occult cirrhosis/steatosis  Assessment/plan:  1.  Hypotension:  Fluid responsive.  Never needed pressors.  Etiology unclear.  No clear infectious source.  Will obtain blood cultures today.  Echo on 9/21 without clear evidence for cardiogenic or obstructive shock.  May be related to her occult cirrhosis.  Will start midodrine 5 mg tid.  2.  Severe malnutrition in the context of chronic illness:  Appreciate RD input.  Apparently even intake of supplements is poor.  Calorie counts in progress.  3.  Possible septic source:  Obtain blood cultures but low suspicion for infectious source.  Continue zosyn for now.  4.  Overall failure to thrive: On review of records she appears to have had low serum protein levels, anasarca and poor PO intake for several months.  Worsening  mobility.  She is already DNR/DNI which is appropriate, but further discussion of goals of care will be necessary especially if it's determined that she needs artificial nutrition.  Recommend continued goals of care discussions even after pt leaves ICU.  Can be by primary team vs palliative care service as desired by primary team.    Rest per resident note.    Jesse Bradshaw

## 2020-09-23 NOTE — PROGRESS NOTES
Follow-up RRT  - add vancomycin  - add 1 dose levaquin  Discussed patient status with Dr. Kohler. Repeated 1000 NS bolus, followed by 50g albumin. Most recent pressure 84/50 after both completed    Freya Winter CNP

## 2020-09-23 NOTE — PLAN OF CARE
N: AO X4, able to make needs known. Pupils unequal at baseline, reactive. Moves all extremities, deconditioned. Declined pain meds.   C: MAP > 60 without PRN intervention. Midodrine started. BLE edema - LLE wrapped by Lymphedema. Afebrile.   R: RA. Lungs clear. Good, dry cough. No sputum to be collected.   GI: High calorie, high protein diet - poor PO intake, no  appetite. MD aware, calorie count in place. Hypoglycemic this AM requiring glucagon + D5/LR gtt. Small loose stool - sample sent to lab. Imodium per patient request.   :  Incontinent of urine X2 + up to commode. UA sent.   SKIN: Primipore to spine and coccyx - blanchable redness. Refused turns to side, education completed.   GTTS: D5/LR @ 100   ACTIVITY: Ambulates in room with 1+ GB and walker. Deconditioned - dyspnea with exertion.     PLAN: Transfer to general care when bed available. Continue to monitor closely and notify MD of changes/concerns.

## 2020-09-23 NOTE — PLAN OF CARE
Received pt hypotensive and bradycardic, RRT called. Has orders to transfer to higher level of care.  Alert and oriented x 4. Denies pain. On continuous tele monitor. Assessed by ICU MD. 2 L of NS bolus given thru midline with not much improvement with BP and HR. Monitored closely by staff , Resource RN and Dr. Pillai. New PIV inserted. Albumin 50 gms given. BP in the 80's/40's, CT in the 60's. Dr. Overton informed at [id number], will confer with ICU MD's. IV Levaquin, Zosyn and Vancomycin given. Incontinent of urine. Report given to ICU RN. Transferred to  with float RN with belongings and monitor

## 2020-09-23 NOTE — PLAN OF CARE
4A PT -   Discharge Planner PT   Patient plan for discharge: return to TCU  Current status: initiated session with slow progression from supine to sitting, BP stable and patient asymptomatic. Patient then transferred to standing min A at FWW and ambulated several short bouts with FWW, CGA, and WC follow requiring multiple seated rest breaks due to fatigue. Patient upright in chair at end of session and educated on low BP and impacts on activity.   Barriers to return to prior living situation: medical status, impaired functional mobility  Recommendations for discharge: TCU  Rationale for recommendations: Patient will benefit from skilled PT in TCU setting to address functional mobility deficits for eventual safe return home.  Entered by: Miles Langley 09/23/2020 11:20 AM

## 2020-09-23 NOTE — PROGRESS NOTES
Admitted/transferred from:   Reason for admission/transfer: Persistent hypotension  2 RN skin assessment: completed by Ricardo RN and ROMAINE Haynes  Result of skin assessment and interventions/actions: blanchable erythema on spine and coccyx (mepilex applied over both sites). Scabs on lower extremities and large bruise on LUE.   Height, weight, drug calc weight: Done  Patient belongings (see Flowsheet)  MDRO education added to care planN/A      ?

## 2020-09-23 NOTE — PLAN OF CARE
"Edema 4A: Pt with shiny and taut BLEs with soft 2+ pitting in distal LEs and firm 2+ pitting in feet bilaterally; skin intact. Given need to monitor blood pressure via LE, only RLE compression donned today with plan to alternate LEs daily to manage edema, protect skin integrity, and promote tolerance for functional mobility. GCBs donned from MTPs to knee creases using \"quick wrap\" technique. Pt reporting comfort. Educated on need to remove if experiencing pain or numbness or wraps become soiled.   "

## 2020-09-23 NOTE — PROVIDER NOTIFICATION
09/22/20 2300   Call Information   Date of Call 09/22/20   Time of Call 2335   Name of person requesting the team Jenny   Title of person requesting team RN   RRT Arrival time 2340   Time RRT ended 0515   Reason for call   Type of RRT Adult   Primary reason for call Cardiovascular   Cardiovascular SBP less than 90   Was patient transferred from the ED, ICU, or PACU within last 24 hours prior to RRT call? Yes   SBAR   Situation Hypotensive BP 50's/30's   Background PMHx of CAD s/p ADOLFO 2018, chronic HFpEF, pAfib not on anticoagulation, MS, breast cancer s/p lumpectomy and lymph node resection, GERD s/p multiple esophageal surgeries with multiple recent hospitalization due to weakness, falls, malnutrition and failure to thrive, who presents with worsening OSORIO for the last week from TCU, admitted on 9/21 for acute hypoxic respiratory failure, concerning for aspiration pneumonia.    Notable History/Conditions Cancer;Cardiac   Assessment Pt A&Ox4, denies lightheadedness/dizziness, denies pain and SOB. Bradycardic 50's.    Interventions Fluid bolus;Meds   Patient Outcome   Patient Outcome Transferred to  (4A)   RRT Team   Date Attending Physician notified 09/22/20   Time Attending Physician notified 2335   Physician(s) Trae Fleming NP   Lead RN Sarina Young   RT Blair Ferguson   Post RRT Intervention Assessment   Post RRT Assessment Transferred to ICU

## 2020-09-23 NOTE — PHARMACY-VANCOMYCIN DOSING SERVICE
Pharmacy Vancomycin Initial Note  Date of Service 2020  Patient's  1945  75 year old, female    Indication: Healthcare-Associated Pneumonia    Current estimated CrCl = Estimated Creatinine Clearance: 57.1 mL/min (based on SCr of 0.64 mg/dL).    Creatinine for last 3 days  2020:  2:08 PM Creatinine 0.47 mg/dL;  6:12 PM Creatinine 0.61 mg/dL  2020:  7:12 AM Creatinine 0.64 mg/dL    Recent Vancomycin Level(s) for last 3 days  No results found for requested labs within last 72 hours.      Vancomycin IV Administrations (past 72 hours)      No vancomycin orders with administrations in past 72 hours.                Nephrotoxins and other renal medications (From now, onward)    Start     Dose/Rate Route Frequency Ordered Stop    20 2330  piperacillin-tazobactam (ZOSYN) 4.5 g vial to attach to  mL bag      4.5 g  over 30 Minutes Intravenous EVERY 6 HOURS 20 2312            Contrast Orders - past 72 hours (72h ago, onward)    Start     Dose/Rate Route Frequency Ordered Stop    20 1200  gadobutrol (GADAVIST) injection 7.5 mL      7.5 mL Intravenous ONCE 20 1150 20 1155    20 1100  gadobutrol (GADAVIST) injection 7.5 mL      7.5 mL Intravenous ONCE 20 1057      20 1530  perflutren diluted 1mL to 2mL with saline (OPTISON) diluted injection 5 mL      5 mL Intravenous ONCE 20 1527 20 1530                Plan:  1.  Start vancomycin  1000 mg IV q24h.   2.  Goal Trough Level: 15-20 mg/L   3.  Pharmacy will check trough levels as appropriate in 1-3 Days.    4. Serum creatinine levels will be ordered daily for the first week of therapy and at least twice weekly for subsequent weeks.    5. Industry method utilized to dose vancomycin therapy: Method 2    Mason Cobian [facility]

## 2020-09-23 NOTE — PLAN OF CARE
BP (!) 74/32   Pulse 60   Temp 96  F (35.6  C) (Oral)   Resp 16   Ht 1.524 m (5')   Wt 46.9 kg (103 lb 6.3 oz)   SpO2 95%   BMI 20.19 kg/m      Time: 1213-3447    Reason for admission: Generalized muscle weakness and SOB  Activity: Up Ax1 w walker and gait belt to [facility], generalized weakness   Pain: Pt reported generalized pain especially in edematous areas of arms and legs-- Toradol administered w some relief, Zanaflex also given at bedtime. PRN tylenol given x1.   Neuro:  A&O x4  Cardiac: WDL ex soft BP, significant cardiac history on tele  Respiratory: Pt reports dyspnea on exertion, lungs are diminished, pt has infrequent, dry cough-- sputum sample pending  GI/: Pt had one tea-colored void and BM mix, stool loose PRN imodium given.  Diet:  Cardiac diet, pt ate 10% of dinner  Lines: Midline infusing IV abx, pt states her arm w midline is painful from insertion  Skin/Wounds: Pt has scattered bruises and scabs throughout. She has a suspected pressure sore on cervical spine, her left inner arm is swollen and bruised, right arm is edematous and weeping, sacrum is reddened and bilateral second toes have pressure injuries present and L side bleeding. Pt skin is dry and flaky, she would benefit from WOC consult.      Plan: Continue to monitor and follow POC, pt still reports general malaise and discomfort

## 2020-09-23 NOTE — PROGRESS NOTES
Calorie Count  Intake recorded for: 9/22  Total Kcals: 21 Total Protein: 2g  Kcals from Hospital Food: 21   Protein: 2g  Kcals from Outside Food (average):0 Protein: 0g  # Meals Recorded: less than 25% hot turkey sandwich w/ mashed potatoes w/ gravy  # Supplements Recorded: 0

## 2020-09-23 NOTE — PROGRESS NOTES
RRT Summary -       This is a 76 y/o woman with CAD, CHF and afib admitted with HCAP vs Aspiration pneumonia who developed acute hypotension this evening in the setting of ongoing diuresis.  Unclear if this represents septic vs cardiogenic vs hypovolemic shock.    - EKG shows a sinus bradycardia  - LA surprisingly normal   - Will give 1 L now and recheck post-bolus.  If still hypotensive, will transfer to ICU  - Hold lasix  - Antibiotics broadened to Zosyn/Vanco from Unasyn  - If hypotension persists consider echocardiogram        Rapid Response Team Note      Admission Diagnosis: Generalized muscle weakness [M62.81]     Hospital Course   Brief Summary of events leading to rapid response:   A rapid response was called for Minerva Blanco due to hypotension.    The patients is known to have an infection.    Significant Comorbidities:   CAD, CHF, Afib, HCAP    Medications   Scheduled     sodium chloride 0.9%  250 mL Intravenous Once     sodium chloride 0.9%  1,000 mL Intravenous Once     enoxaparin ANTICOAGULANT  40 mg Subcutaneous Q24H     folic acid  1 mg Oral Daily     gabapentin  100 mg Oral At Bedtime     gadobutrol  7.5 mL Intravenous Once     influenza vac high-dose quad  0.7 mL Intramuscular Prior to discharge     lactobacillus rhamnosus (GG)  1 capsule Oral BID     metoprolol succinate ER  12.5 mg Oral QAM     multivitamin, therapeutic  1 tablet Oral Daily     omeprazole  20 mg Oral TID AC     piperacillin-tazobactam  4.5 g Intravenous Q6H     potassium chloride  20 mEq Oral BID     sodium chloride (PF)  3 mL Intracatheter Q8H     vitamin B1  100 mg Oral Daily     tiZANidine  4 mg Oral At Bedtime     venlafaxine  37.5 mg Oral Daily     [START ON 9/27/2020] vitamin D2  50,000 Units Oral Weekly      PRN   acetaminophen, albuterol, carboxymethylcellulose PF, ketotifen, lidocaine 4%, lidocaine (buffered or not buffered), loperamide, magnesium sulfate, melatonin, methocarbamol, naloxone, omeprazole, ondansetron  **OR** ondansetron, prochlorperazine **OR** prochlorperazine **OR** prochlorperazine, sodium chloride (PF), sodium phosphate, sodium phosphate, sodium phosphate   Allergies   Allergies   Allergen Reactions     Amoxicillin Diarrhea     Ativan [Lorazepam] Other (See Comments)     Hallucinations     Morphine Itching        Physical Exam   Temp: 96  F (35.6  C) Temp  Min: 95.7  F (35.4  C)  Max: 97.3  F (36.3  C)  Resp: 16 Resp  Min: 15  Max: 16  SpO2: 95 % SpO2  Min: 93 %  Max: 99 %  Pulse: 60 Pulse  Min: 60  Max: 73    No data recorded  BP: (!) 74/32 Systolic (24hrs), Av , Min:73 , Max:111   Diastolic (24hrs), Av, Min:32, Max:51     I/Os: I/O last 3 completed shifts:  In: 500 [P.O.:480; I.V.:20]  Out: 675 [Urine:675]     Exam:   General: acutely ill appearing  Mental Status: AAOx4.    Hypotensive on multiple rechecks with systolics in the 60s/70s    Significant Results and Procedures   Lactic Acid:   Recent Labs   Lab Test 20  2323 20  1408 20  0407 20  1844 20  1949   LACT 1.1 1.5 Canceled, Test credited  --   --    LACTS  --   --  1.2 2.8* 2.0     CBC:   Recent Labs   Lab Test 20  2323 20  0712 20  1408   WBC 5.1 4.0 7.1   HGB 8.2* 8.3* 10.9*   HCT 25.8* 26.2* 34.6*    242 335        Assessment   In assessment a rapid response was called on Minerva Blanco due to hypotension worrisome for over-diuresis vs septic or cardiogenic shock.     This presentation is likely due to over-diuresis vs septic or cardiogenic shock  and worsened by the comorbidities listed above. The patient is critically ill with shock. Features of hypotension are alarming and indicate that the patient is at imminent risk of life-threatening organ failure. Acute deterioration is being managed by the following critical interventions:     - Broadening antibiotics to Zosyn, Vanco  - 1 L bolus  - CBC, EKG.    Sepsis Evaluation   Minerva Blanco meets SIRS criteria AND has a lactate >2  or other evidence of acute organ damage.  These vital signs, lab and physical exam findings are consistent with SEVERE SEPSIS.    Sepsis Time-Zero (time severe sepsis diagnosis confirmed): 2315 09/22/20 as this was the time when SBP <90 or MAP <65     Anti-infectives (From now, onward)    Start     Dose/Rate Route Frequency Ordered Stop    09/22/20 2330  piperacillin-tazobactam (ZOSYN) 4.5 g vial to attach to  mL bag      4.5 g  over 30 Minutes Intravenous EVERY 6 HOURS 09/22/20 2312          Current antibiotic coverage requires additional antibiotics for pseudomonal source.    3 Hour Severe Sepsis Bundle Completion:  1. Initial Lactic Acid result shown above. Repeat lactic acid ordered for 1 hour from now.   2. Blood Cultures before Antibiotics: No, antibiotics were started prior to BCx collection b/c waiting for BCx to be collected would have been detrimental to the patient  3. Broad Spectrum Antibiotics Administered: yes  4. Fluids: 1000 mL fluids ORDERED to be given     Plan   As above    Dis[place]: [place] [facility]    The Internal Medicine primary team was able to be reached and they are in agreement with the above plan.    Reassessment   Reassessment and plan follow-up will be performed by the rapid response team. The current agreed upon plan for reassessment/follow-up includes bedside exam and will be completed in 30 minutes.    Time Spent on this Encounter   Total Critical Care time spent by me, excluding procedures, was 35 minutes.    CLIVE Bueno CNP  [facility] RRT [facility] Job Code Contact #[phone]

## 2020-09-23 NOTE — PROGRESS NOTES
Medicine cross cover note -    Blood pressures improving slightly with fluids (systolic 50s->70s).  Will plan to give the rest of her bolus and transfer to ICU if hypotension persists.  D/w ICU attending and they are in agreement with this plan.

## 2020-09-23 NOTE — PROGRESS NOTES
Major Shift Events:  Neuro exam intact, cardiac rhythm remains sinus rhythm, MAP maintaining >63 with no additional interventions since arrival to ICU (per MICU team okay with MAP > 60), afebrile, pulses easily palpable. SpO2 WNL on RA, purewick placed, mepilex placed over coccyx and spine due to blanchable erythema.     Plan: Continue to monitor BP closely and update team with acute changes.   For vital signs and complete assessments, please see documentation flowsheets.

## 2020-09-23 NOTE — H&P
MICU Admission  History &Physical  Minerva Blanco MRN: [id number]  Age: 75 year old, : 1945  Date of Admission:2020  Primary care provider: Clinic, [facility]          Chief Complaint  Hypotension         History of Present Illness  Patient admitted from TCU after multiple recent admissions with concerns over dyspnea. Initially on O2 but sats have been fine on room air thus far.     ICU alerted due to concern for hypotension.  Limited sites for BP check due to current IVs and history of breast cancer.  Patient mentating well and is having some response to fluid/albumin.       ROS: negative for F/C/N/V/Abd pain/dysuria/bleeding.  Has been having loose stools since starting ABX but does chronically have loose stools.            Past Medical History  Past Medical History:   Diagnosis Date     Breast cancer (H)     right side - lumpectomy, chemo, and local radiation     Chronic infection     infection/wound for two years after esoph surgery     Compression fracture of spine (H)     T12-L1     Coronary artery disease 2018    s/p PCI with ADOLFO to proximal and mid RCA     Esophageal perforation      Fibromyalgia      GERD (gastroesophageal reflux disease)      Hiatal hernia      IBS (irritable bowel syndrome)      Meniere's disease     deaf left ear     Multiple sclerosis (H)      Noninfectious ileitis      Other chronic pain      Paroxysmal atrial fibrillation (H)              Past Surgical History  Past Surgical History:   Procedure Laterality Date     APPENDECTOMY       BACK SURGERY  2015    lumbar fusion     BRONCHOSCOPY FLEXIBLE N/A 2017    Procedure: BRONCHOSCOPY FLEXIBLE;;  Surgeon: Jens Wise MD;  [facility]     C GASTROSTOMY/JEJUN TUBE      J tube for feedings     CLOSE SPIT FISTULA N/A 2017    Procedure: CLOSE SPIT FISTULA;;  Surgeon: Jens Wise MD;  [facility]     COMBINED ESOPHAGOSCOPY,  GASTROSCOPY, DUODENOSCOPY (EGD), REMOVE ESOPHAGEAL STENT N/A 8/26/2016    Procedure: COMBINED ESOPHAGOSCOPY, GASTROSCOPY, DUODENOSCOPY (EGD), REMOVE ESOPHAGEAL STENT;  Surgeon: Jens Wise MD;  [facility]     COMBINED ESOPHAGOSCOPY, GASTROSCOPY, DUODENOSCOPY (EGD), REMOVE ESOPHAGEAL STENT N/A 2/12/2018    Procedure: COMBINED ESOPHAGOSCOPY, GASTROSCOPY, DUODENOSCOPY (EGD), REMOVE ESOPHAGEAL STENT;  Esophagogastroduodenoscopy With Stent Removal;  Surgeon: Jens Wise MD;  [facility]     COMBINED ESOPHAGOSCOPY, GASTROSCOPY, DUODENOSCOPY (EGD), REPLACE ESOPHAGEAL STENT N/A 8/25/2017    Procedure: COMBINED ESOPHAGOSCOPY, GASTROSCOPY, DUODENOSCOPY (EGD), REPLACE ESOPHAGEAL STENT;;  Surgeon: Jnes Wise MD;  [facility]     COMBINED ESOPHAGOSCOPY, GASTROSCOPY, DUODENOSCOPY (EGD), REPLACE ESOPHAGEAL STENT N/A 9/15/2017    Procedure: COMBINED ESOPHAGOSCOPY, GASTROSCOPY, DUODENOSCOPY (EGD), REPLACE ESOPHAGEAL STENT;  Upper Endoscopy with Stent Exchange, Cauterization of Tracheosophageal Fistula, Cauterization of Chest Wound   ;  Surgeon: Jens Wise MD;  [facility]     COMBINED ESOPHAGOSCOPY, GASTROSCOPY, DUODENOSCOPY (EGD), REPLACE ESOPHAGEAL STENT N/A 10/20/2017    Procedure: COMBINED ESOPHAGOSCOPY, GASTROSCOPY, DUODENOSCOPY (EGD), REPLACE ESOPHAGEAL STENT;  Upper Endoscopy with Stent Exchange, Cauterization Tracheosphageal Fistula Tract;  Surgeon: Nalini Barreto MD;  [facility]     COMBINED ESOPHAGOSCOPY, GASTROSCOPY, DUODENOSCOPY (EGD), REPLACE ESOPHAGEAL STENT N/A 11/3/2017    Procedure: COMBINED ESOPHAGOSCOPY, GASTROSCOPY, DUODENOSCOPY (EGD), REPLACE ESOPHAGEAL STENT;  Esophagogastroduodenoscopy, Stent Revision, Cauterization Of Traceoesophageal Fistula and stent exchange;  Surgeon: Nalini Barreto MD;  [facility]     COMBINED ESOPHAGOSCOPY, GASTROSCOPY, DUODENOSCOPY (EGD), REPLACE ESOPHAGEAL STENT N/A 11/17/2017    Procedure: COMBINED  ESOPHAGOSCOPY, GASTROSCOPY, DUODENOSCOPY (EGD), REPLACE ESOPHAGEAL STENT;  Esophagogastroduodenoscopy, Esophogeal Stent Removal, Esophogeal Stent Placement ([id number] EndoMAXX), Cauterization Of Fistula Tract;  Surgeon: Nalini Barreto MD;  [facility]     CREATE SPIT FISTULA N/A 1/9/2017    Procedure: CREATE SPIT FISTULA;  Surgeon: Jens Wise MD;  [facility]     ESOPHAGECTOMY N/A 1/9/2017    Procedure: ESOPHAGECTOMY;  Surgeon: Jens Wise MD;  [facility]     ESOPHAGOSCOPY, GASTROSCOPY, DUODENOSCOPY (EGD), COMBINED N/A 4/18/2016    Procedure: COMBINED ESOPHAGOSCOPY, GASTROSCOPY, DUODENOSCOPY (EGD);  Surgeon: Alexis Barraza MD;  [facility]     ESOPHAGOSCOPY, GASTROSCOPY, DUODENOSCOPY (EGD), COMBINED N/A 4/25/2016    Procedure: COMBINED ESOPHAGOSCOPY, GASTROSCOPY, DUODENOSCOPY (EGD);  Surgeon: Alexis Barraza MD;  [facility]     ESOPHAGOSCOPY, GASTROSCOPY, DUODENOSCOPY (EGD), COMBINED N/A 5/4/2016    Procedure: COMBINED ESOPHAGOSCOPY, GASTROSCOPY, DUODENOSCOPY (EGD);  Surgeon: Alexis Barraza MD;  [facility]     ESOPHAGOSCOPY, GASTROSCOPY, DUODENOSCOPY (EGD), COMBINED N/A 5/18/2016    Procedure: COMBINED ESOPHAGOSCOPY, GASTROSCOPY, DUODENOSCOPY (EGD);  Surgeon: Alexis Barraza MD;  [facility]     ESOPHAGOSCOPY, GASTROSCOPY, DUODENOSCOPY (EGD), COMBINED N/A 6/22/2016    Procedure: COMBINED ESOPHAGOSCOPY, GASTROSCOPY, DUODENOSCOPY (EGD);  Surgeon: Alexis Barraza MD;  [facility]     ESOPHAGOSCOPY, GASTROSCOPY, DUODENOSCOPY (EGD), COMBINED N/A 7/12/2016    Procedure: COMBINED ESOPHAGOSCOPY, GASTROSCOPY, DUODENOSCOPY (EGD);  Surgeon: Jens Wise MD;  [facility]     ESOPHAGOSCOPY, GASTROSCOPY, DUODENOSCOPY (EGD), COMBINED N/A 4/21/2017    Procedure: COMBINED ESOPHAGOSCOPY, GASTROSCOPY, DUODENOSCOPY (EGD);  Esophagogastroduodenoscopy, Pharyngostomy Tube Placement ;  Surgeon: Frandy  Jens Saul MD;  [facility]     ESOPHAGOSCOPY, GASTROSCOPY, DUODENOSCOPY (EGD), COMBINED N/A 5/19/2017    Procedure: COMBINED ESOPHAGOSCOPY, GASTROSCOPY, DUODENOSCOPY (EGD);  Upper Endoscopy, Irrigation and Debridement of Chest Wound ;  Surgeon: Nalini Barreto MD;  [facility]     ESOPHAGOSCOPY, GASTROSCOPY, DUODENOSCOPY (EGD), COMBINED N/A 5/23/2017    Procedure: COMBINED ESOPHAGOSCOPY, GASTROSCOPY, DUODENOSCOPY (EGD);;  Surgeon: Nalini Barreto MD;  [facility]     ESOPHAGOSCOPY, GASTROSCOPY, DUODENOSCOPY (EGD), COMBINED N/A 6/27/2017    Procedure: COMBINED ESOPHAGOSCOPY, GASTROSCOPY, DUODENOSCOPY (EGD);  Esophagogastroduodenoscopy With Removal And Replacement Of Esophageal Stent exchange. Exchange of pharyngtomy tube. Chest wound dressing change;  Surgeon: Nalini Barreto MD;  [facility]     ESOPHAGOSCOPY, GASTROSCOPY, DUODENOSCOPY (EGD), COMBINED N/A 8/4/2017    Procedure: COMBINED ESOPHAGOSCOPY, GASTROSCOPY, DUODENOSCOPY (EGD);  Esophagogastroduodenoscopy, Stent Removal and Stent Replacement. Dressing Change ;  Surgeon: Nalini Barreto MD;  [facility]     ESOPHAGOSCOPY, GASTROSCOPY, DUODENOSCOPY (EGD), COMBINED N/A 8/25/2017    Procedure: COMBINED ESOPHAGOSCOPY, GASTROSCOPY, DUODENOSCOPY (EGD);  Esophagogastroduodenoscopy,  Stent Revision,  Cauterization;  Surgeon: Jens Wise MD;  [facility]     ESOPHAGOSCOPY, GASTROSCOPY, DUODENOSCOPY (EGD), COMBINED N/A 10/2/2017    Procedure: COMBINED ESOPHAGOSCOPY, GASTROSCOPY, DUODENOSCOPY (EGD);  Esophagogastroduodenostomy, Replacement of Esophageal Stent, Cauterization of Tracheosophageal Fistula anc chest wall,   C-arm;  Surgeon: Nalini Barreto MD;  [facility]     ESOPHAGOSCOPY, GASTROSCOPY, DUODENOSCOPY (EGD), DILATATION, COMBINED N/A 6/29/2016    Procedure: COMBINED ESOPHAGOSCOPY, GASTROSCOPY, DUODENOSCOPY (EGD), DILATATION;  Surgeon: Jens Wise MD;  [facility]     EXPLORE NECK N/A 5/19/2017     Procedure: EXPLORE NECK;;  Surgeon: Nalini Barreto MD;  [facility]     HC UGI ENDOSCOPY W TRANSENDOSCOPIC STENT PLACEMENT N/A 7/12/2016    Procedure: COMBINED ESOPHAGOSCOPY, GASTROSCOPY, DUODENOSCOPY (EGD), PLACE TRANSENDOSCOPIC ESOPHAGEAL STENT;  Surgeon: Jens Wise MD;  [facility]     HC UGI ENDOSCOPY W TRANSENDOSCOPIC STENT PLACEMENT N/A 7/22/2016    Procedure: COMBINED ESOPHAGOSCOPY, GASTROSCOPY, DUODENOSCOPY (EGD), PLACE TRANSENDOSCOPIC ESOPHAGEAL STENT;  Surgeon: Jens Wise MD;  [facility]     INCISION AND DRAINAGE CHEST WASHOUT, COMBINED N/A 5/27/2017    Procedure: COMBINED INCISION AND DRAINAGE CHEST WASHOUT;  Chest washout;  Surgeon: Nalini Barreto MD;  [facility]     INCISION AND DRAINAGE CHEST WASHOUT, COMBINED N/A 5/28/2017    Procedure: COMBINED INCISION AND DRAINAGE CHEST WASHOUT;  Chest washout;  Surgeon: Nalini Barreto MD;  [facility]     INCISION AND DRAINAGE CHEST WASHOUT, COMBINED N/A 6/9/2017    Procedure: COMBINED INCISION AND DRAINAGE CHEST WASHOUT;  Chest washout and dressing change, Esophaogastroduodenoscopy;  Surgeon: Jens Wise MD;  [facility]     INCISION AND DRAINAGE CHEST WASHOUT, COMBINED N/A 7/17/2017    Procedure: COMBINED INCISION AND DRAINAGE CHEST WASHOUT;  Incision and Drainage of right Chest Wound, Esophagogastroduodenoscopy with stent exchange. pharangostomy tube revision;  Surgeon: Jens Wise MD;  [facility]     IRRIGATION AND DEBRIDEMENT CHEST WASHOUT, COMBINED N/A 6/29/2016    Procedure: COMBINED IRRIGATION AND DEBRIDEMENT CHEST WASHOUT;  Surgeon: Jens Wise MD;  [facility]     IRRIGATION AND DEBRIDEMENT CHEST WASHOUT, COMBINED N/A 5/23/2017    Procedure: COMBINED IRRIGATION AND DEBRIDEMENT CHEST WASHOUT;  COMBINED IRRIGATION AND DEBRIDEMENT CHEST WASHOUT,COMBINED ESOPHAGOSCOPY, GASTROSCOPY, DUODENOSCOPY (EGD) ;  Surgeon: Nalini Barreto MD;  [facility]      IRRIGATION AND DEBRIDEMENT CHEST WASHOUT, COMBINED N/A 5/24/2017    Procedure: COMBINED IRRIGATION AND DEBRIDEMENT CHEST WASHOUT;  Chest wound Washout and Dressing Change;  Surgeon: Nalini Barreto MD;  [facility]     IRRIGATION AND DEBRIDEMENT CHEST WASHOUT, COMBINED N/A 5/25/2017    Procedure: COMBINED IRRIGATION AND DEBRIDEMENT CHEST WASHOUT;  Irrigation and Debridement Chest Washout and dressing change;  Surgeon: Nalini Barreto MD;  [facility]     IRRIGATION AND DEBRIDEMENT CHEST WASHOUT, COMBINED N/A 5/26/2017    Procedure: COMBINED IRRIGATION AND DEBRIDEMENT CHEST WASHOUT;  Irrigation and Debridement Chest Washout with  dressing change;  Surgeon: Nalini Barreto MD;  [facility]     IRRIGATION AND DEBRIDEMENT CHEST WASHOUT, COMBINED N/A 5/30/2017    Procedure: COMBINED IRRIGATION AND DEBRIDEMENT CHEST WASHOUT;  Irrigation and Debridement Chest Washout , PICC line dressing change;  Surgeon: Nalini Barreto MD;  [facility]     IRRIGATION AND DEBRIDEMENT CHEST WASHOUT, COMBINED N/A 5/31/2017    Procedure: COMBINED IRRIGATION AND DEBRIDEMENT CHEST WASHOUT;  Irrigation and Debridement Chest Washout ;  Surgeon: Nalini Barreto MD;  [facility]     IRRIGATION AND DEBRIDEMENT CHEST WASHOUT, COMBINED N/A 6/1/2017    Procedure: COMBINED IRRIGATION AND DEBRIDEMENT CHEST WASHOUT;  Chest Wound Irrigation And Debridement with dressing change ;  Surgeon: Nalini Barreto MD;  [facility]     IRRIGATION AND DEBRIDEMENT CHEST WASHOUT, COMBINED N/A 6/4/2017    Procedure: COMBINED IRRIGATION AND DEBRIDEMENT CHEST WASHOUT;  COMBINED IRRIGATION AND DEBRIDEMENT CHEST WASHOUT, Esophagoscopy, Gastroscopy, Duodenoscopy (EGD) place transendoscopic esophageal stent,   Pharyngostomy and chest wall tube exchange  C-Arm;  Surgeon: Jens Wise MD;  [facility]     IRRIGATION AND DEBRIDEMENT CHEST WASHOUT, COMBINED N/A 6/2/2017    Procedure: COMBINED IRRIGATION AND DEBRIDEMENT CHEST WASHOUT;   Chest Wound Irrigation and Debridement, Dressing Change;  Surgeon: Nalini Barreto MD;  [facility]     LAPAROSCOPIC ASSISTED INSERTION TUBE JEJUNOSTOMY N/A 4/17/2017    Procedure: LAPAROSCOPIC ASSISTED INSERTION TUBE JEJUNOSTOMY;;  Surgeon: Jens Wise MD;  [facility]     LAPAROSCOPY DIAGNOSTIC (GENERAL) N/A 1/9/2017    Procedure: LAPAROSCOPY DIAGNOSTIC (GENERAL);  Surgeon: Jens Wise MD;  [facility]     LAPAROSCOPY DIAGNOSTIC (GENERAL) N/A 4/17/2017    Procedure: LAPAROSCOPY DIAGNOSTIC (GENERAL);  Laparoscopic , Neck Dissection, Spit Fistula Takedown, Laparoscopic Jejunostomy Tube and Pharyngostomy Tube, Gastric Pull up, Upper Endoscopy(EGD)  , Flexible Bronchoscopy ,Sternotomy ;  Surgeon: Jens Wise MD;  [facility]     LUMPECTOMY BREAST Right 2007     NERVE BLOCK PERIPHERAL N/A 8/30/2016    Procedure: NERVE BLOCK PERIPHERAL;  Surgeon: GENERIC ANESTHESIA PROVIDER;  [facility]     NISSEN FUNDOPLICATION  1/2016     PHARYNGOSTOMY N/A 4/18/2016    Procedure: PHARYNGOSTOMY;  Surgeon: Alexis Barraza MD;  [facility]     PHARYNGOSTOMY N/A 4/25/2016    Procedure: PHARYNGOSTOMY;  Surgeon: Alexis Barraza MD;  [facility]     PHARYNGOSTOMY N/A 5/4/2016    Procedure: PHARYNGOSTOMY;  Surgeon: Alexis Barraza MD;  [facility]     PHARYNGOSTOMY N/A 6/22/2016    Procedure: PHARYNGOSTOMY;  Surgeon: Alexis Barraza MD;  [facility]     PHARYNGOSTOMY N/A 6/29/2016    Procedure: PHARYNGOSTOMY;  Surgeon: Jens Wise MD;  [facility]     PHARYNGOSTOMY N/A 4/21/2017    Procedure: PHARYNGOSTOMY;;  Surgeon: Jens Wise MD;  [facility]     PHARYNGOSTOMY Left 5/31/2017    Procedure: PHARYNGOSTOMY;;  Surgeon: Nalini Barreto MD;  [facility]     PICC INSERTION Left 8/25/2016    5fr DL BioFlo PICC, 42cm (3cm external) in the L medial brachial vein w/ tip in the SVC RA  "junction.     PICC INSERTION - Rewire Right 2017    5fr DL BioFlo PICC, 40cm (6cm external) in the R medial brachial vein w/ tip in the SVC RA junction.     REPAIR FISTULA TRACHEOESOPHAGEAL N/A 9/15/2017    Procedure: REPAIR FISTULA TRACHEOESOPHAGEAL;;  Surgeon: Jens Wise MD;  [facility]     THORACOTOMY Right     lung infection - \"hard crust formed on lung\"     THORACOTOMY Left 2017    Procedure: THORACOTOMY;  Surgeon: Jens Wise MD;  [facility]     WRIST SURGERY                Social History  Social History     Tobacco Use     Smoking status: Former Smoker     Packs/day: 1.00     Years: 15.00     Pack years: 15.00     Types: Cigarettes     Last attempt to quit: 1998     Years since quittin.7     Smokeless tobacco: Never Used   Substance Use Topics     Alcohol use: No     Alcohol/week: 0.0 standard drinks             Family History  Family History   Problem Relation Age of Onset     Alzheimer Disease Mother      Cerebrovascular Disease Father         cerebral hemorrhage     Ovarian Cancer Sister      Breast Cancer Daughter      Family history reviewed and updated in EPIC and not discussed          Allergies  Allergies   Allergen Reactions     Amoxicillin Diarrhea     Ativan [Lorazepam] Other (See Comments)     Hallucinations     Morphine Itching             Medications  Current Facility-Administered Medications   Medication     0.9% sodium chloride BOLUS     acetaminophen (TYLENOL) tablet 650 mg     albuterol (PROAIR HFA/PROVENTIL HFA/VENTOLIN HFA) 108 (90 Base) MCG/ACT inhaler 1-2 puff     carboxymethylcellulose PF (REFRESH PLUS) 0.5 % ophthalmic solution 1 drop     enoxaparin ANTICOAGULANT (LOVENOX) injection 40 mg     folic acid (FOLVITE) tablet 1 mg     gabapentin (NEURONTIN) capsule 100 mg     gadobutrol (GADAVIST) injection 7.5 mL     influenza vac high-dose quad (FLUZONE HD) injection BALDEV 0.7 mL     ketotifen (ZADITOR) 0.025 % ophthalmic " solution 1 drop     lactobacillus rhamnosus (GG) (CULTURELL) capsule 1 capsule     lidocaine (LMX4) cream     lidocaine 1 % 0.1-1 mL     loperamide (IMODIUM) capsule 2 mg     magnesium sulfate 4 g in 100 mL sterile water (premade)     melatonin tablet 3 mg     methocarbamol (ROBAXIN) tablet 500 mg     multivitamin, therapeutic (THERA-VIT) tablet 1 tablet     naloxone (NARCAN) injection 0.1-0.4 mg     naloxone (NARCAN) injection 0.1-0.4 mg     naloxone (NARCAN) injection 0.1-0.4 mg     omeprazole (priLOSEC) CR capsule 20 mg     omeprazole (priLOSEC) CR capsule 20 mg     ondansetron (ZOFRAN-ODT) ODT tab 4 mg    Or     ondansetron (ZOFRAN) injection 4 mg     piperacillin-tazobactam (ZOSYN) 4.5 g vial to attach to  mL bag     potassium chloride (KLOR-CON) Packet 20 mEq     prochlorperazine (COMPAZINE) injection 5 mg    Or     prochlorperazine (COMPAZINE) tablet 5 mg    Or     prochlorperazine (COMPAZINE) suppository 12.5 mg     sodium chloride (PF) 0.9% PF flush 3 mL     sodium chloride (PF) 0.9% PF flush 3 mL     sodium phosphate 15 mmol in D5W intermittent infusion     sodium phosphate 20 mmol in D5W intermittent infusion     sodium phosphate 25 mmol in D5W intermittent infusion     thiamine (B-1) tablet 100 mg     tiZANidine (ZANAFLEX) tablet 4 mg     vancomycin (VANCOCIN) 1000 mg in dextrose 5% 200 mL PREMIX     venlafaxine (EFFEXOR-XR) 24 hr capsule 37.5 mg     [START ON 9/27/2020] vitamin D2 (ERGOCALCIFEROL) 62956 units (1250 mcg) capsule 50,000 Units             Vitals  Temp:  [95.6  F (35.3  C)-97.3  F (36.3  C)] 95.6  F (35.3  C)  Pulse:  [47-73] 57  Resp:  [14-16] 14  BP: ()/(32-51) 84/50  SpO2:  [91 %-99 %] 91 %        Physical Exam    Gen: Frail elderly female.  Supine in bed, NAD  HEENT:AT/ NC, PERRL b/l,  sclera anicteric  PULM/THORAX:CTAB, decreased at the bases  CV:RRR, NMRG  ABD: soft, nontender, nondistended. Normoactive bowel sounds x 4  EXT: warm well perfused, 1 pitting edema  At the  ankles/shins bilaterally  SKIN: Capillary refill normal        ROUTINE ICU LABS (Last four results)    CMP  Recent Labs   Lab 09/22/20  0712 09/21/20  1812 09/21/20  1408     --  137   POTASSIUM 4.0  --  4.6   CHLORIDE 108  --  107   CO2 26  --  26   ANIONGAP 7  --  4   GLC 71  --  85   BUN 4*  --  5*   CR 0.64 0.61 0.47*   GFRESTIMATED 87 89 >90   GFRESTBLACK >90 >90 >90   ANNABELLE 7.4*  --  7.7*   MAG 2.7*  --  1.4*   PHOS 1.8*  --  1.6*   PROTTOTAL 4.0*  --  5.2*   ALBUMIN 1.1*  --  1.5*   BILITOTAL 1.5*  --  2.1*   ALKPHOS 191*  --  244*   AST 37  --  Canceled, Test credited   ALT 19  --  29     CBC  Recent Labs   Lab 09/22/20  2323 09/22/20  0712 09/21/20  1408   WBC 5.1 4.0 7.1   RBC 2.14* 2.14* 2.81*   HGB 8.2* 8.3* 10.9*   HCT 25.8* 26.2* 34.6*   * 122* 123*   MCH 38.3* 38.8* 38.8*   MCHC 31.8 31.7 31.5   RDW 14.5 14.9 14.7    242 335     INR  Recent Labs   Lab 09/22/20  0712   INR 1.60*     Arterial Blood GasNo lab results found in last 7 days.     Microbiology  PCT wnl 9/21   Sputum culture cancelled            Imaging  MRCP:  1. Dilated common bile duct measuring up to 12 mm with intrahepatic  biliary ductal dilatation. There is no obstructing calculus or mass  lesion noted. Furthermore, this medication has been stable since at  least 2016. No MRI evidence for cholangitis, which is typically a  clinical diagnosis. This finding being stable suggests benignity. The  etiology could be a benign and chronic stricturing at the level of  ampulla versus mass effect from duodenal diverticulum.  2. Hepatic steatosis with findings suggesting cirrhosis.  3. Gallbladder sludge/small stones.  4. Volume overload with small volume ascites, moderate anasarca, and  small bilateral pleural effusions           Assessment and Plan  Minerva Blanco is a 75 year old female with PMH failure to thrive, malnutrition, EtOH use, CAD s/p stent in 0218, HFpEF, MS, breast cancer s/p lumpectomy with lymph node, meniere's  disease, paroxysmal afib, GERD s/p multiple esophageal surgeries who presented from TCU and complained of dyspnea now transferred to ICU with borderline BPs.      Neurologic  # Hx depression:  - Continue PTA Effexor     Cardiovascular  # Hypotension: Concern for sepsis on the floor however mentating well and lactate normal.  Not clear if we are getting a good reading.  ABX broadened prio to tranfer.  In sinus rizwan currently but EXT warm, this could be contributing to borderline pressures however.  Fluid overloaded but again warm EXT I do not think cardiogenic shock fits. Perhaps also component of volume contraction from diuresis.  Will monitor, MAP 65 during exam.  ART line if dropping pressures.  - Re culture, consider cdiff if stools worsening, abd pain, leukocytosis etc  - Continue Vanc/zosyn for now  - ART if MAPs dropping    # CAD:   # P Afib:   - Holding BB    Respiratory  # Dyspnea: Appears volume up on exam and BNP is elevated.  May benefit from diuresis when pressures allow.  I do not see much e/o PNA currently but will repeat procal and reculture.    - ABX as below  - Consider gentle diuresis when able      Gastrointestinal  # Elevated ALP:  # Elevated Tbili:   - MRCP unremarkable  - CTM    Infectious Disease  # Concern for infection: No clear focus on my exam or review of current workup.    - Continue ABX for now  - Re culture  - Likely step down pending course    Hematology  # Macrocytic anemia: B12 recently wnl.  Folate elevated (likely related to liver disease) and haptocorin dysregulation. Smear done, no mention MDS.    - Follow up TSH, Retic      Endocrine  No acute issues    Renal/Electolytes/FEN  FILTER. Baseline Cr: at baseline (BUN very low likely related to malnutrition).     VOLUME STATUS: Hypervolemic    Skin Care  Per nursing    PPX:   - VTE: Mechanical  FEN: Continue high nell diet   Consults: None  Tubes/Lines/Drains:     CODE: DNR/DNI  Family: Update in the AM  Dispo: ICU pending course      Patient discussed with staff attending, Dr. Kohler.  Please feel free to page with questions.    Rio Zarco MD    Internal Medicine PGY-3  Pager: [phone]

## 2020-09-23 NOTE — PROGRESS NOTES
{ TIP  Improve documentation with new tip texts.  DO NOT DELETE TIPS. Once your note is signed tips will disappear. Learn more- Malnutrition tip sheet, Sepsis tip sheet, & QuickLearn videos                                    :[id number]}    [facility], [facility] Progress Note - Hospitalist Service, Gold 4       Date of Admission:  9/21/2020  Assessment & Plan          74 yo F with PMHx of CAD s/p ADOLFO 2018, chronic HFpEF, pAfib not on anticoagulation, MS, breast cancer s/p lumpectomy and lymph node resection, GERD s/p multiple esophageal surgeries with multiple recent hospitalization due to weakness, falls, malnutrition and failure to thrive, who presents with worsening OSORIO for the last week from TCU, admitted on 9/21 for acute hypoxic respiratory failure, concerning for aspiration pneumonia. Patient became hypotensive, requiring short ICU stay for fluid resuscitation, now returned to the floor.     #Hypotension, resolved   Develops hypotension to SBP 60 on 9/22. Asymptomatic. DDx Hypovolemic vs septic. Improved with IVF and albumin, and did not require pressors. Work up for infectious cause with possible pneumonia as noted below. Small ascites, but low suspicion for SBP. Pt is hypothermic and leukopenic. Lactic flat. Procal 0.10. Patient is likely intravascularly depleted and third spacing causing low blood pressures.   -Midodrine 5 mg TID  -Follow up blood cultures  -Continue broad spectrum abx zosyn   -hold diuresis   -Check AM cortisol     #Dypsnea on exertion   #Acute hypoxic respiratory failure, resolved  #Aspiration vs HCAP   #Pulmonary edema   Patient endorses worsening shortness of breath on exertion over the last week. Also endorses weight gain, LE edema, and orthopnea. CXR prior to admission at TCU with LLL consolidation. Patient was started on flagyl and ceftin on 9/17 and developed oxygen requirement. On this admission, patient is currently 99% on RA without any  tachypnea. CXR clear. COVID 19 negative. Procal 0.18. TTE with moderate-severe tricuspid regurgitation, unable to get true PA pressures. DDx includes aspiration pneumonia with esophageal strictures and alcohol use vs pulmonary hypertension vs valvular disease, vs hypervolemia due to anasarca.   -Broadened with Zosyn   -SLP recommended regular diet with GERD precautions  -Urine antigen strep pneumoniae and legionella negative   -IS    #Anasarca  Etiology unclear, but most likely due to underlying cirrhosis vs malnutrition vs HFpEF. Albumin low at 1.1. Imaging with small amount of ascites, pleural effusions and LE edema which is weeping. TTE with normal EF. NT-proBNP 2039. UA with only 10 protein, making nephrotic syndrome unlikely.   -Hold diuresis with hypotension   -Daily weights  -Strict I&O  -High protein diet   -nutrition consult with calorie counts    #Transaminitis  #Coagulopathy   #Hepatitc steatosis, with concern for underlying cirrhosis   Chronically elevated Alk phos since 2017. Total bilirubin and AST/ALT mildly elevated since 8/2020. INR elevated at 1.62. Patient states she drinks daily, which per family is under reported. MRCP today with hepatic steatosis with cirrhotic morphology, also showing chronic CBD dilation to 12mm without any obstruction or mass unchanged from 2016. High suspicion for underlying cirrhosis. Small amount of ascites on imaging, denies any abd pain, fevers, or leukocytosis to suggest SBP. No signs of HE on exam.  -Hepatology consult for tomorrow    -Hold lactulose at this time with no HE  -Trend CMP and INR   -Hold diuresis     #Alcohol use  Reports drinking 1-2 drinks a day, but per family patient this is very under reported.   -Folic acid, thiamine, and MVI  -Will see if patient is interested in CD  -Monitor for any withdrawal     #Macrocytic anemia  MCV quite elevated at 122. Hemoglobin baseline 10.0s. Dropped to 8.3 this morning. Recent work up in August with Vitamin B12 9499.  Folate 7.6. TSH at 1.81. No bone marrow biopsy. [place] [facility] [place] with differential including liver disease, malnutrition, renal dx, GI loss, or inflammation. Unable to find UPEP on care-everywhere.   -Trend CBC  -Consider follow up with hematology in outpatient      #Severe protein calore malnutrition   Albumin 1.1 with anasarca.   -Nutrition consult  -Calorie counts  -High protein/high calorie diet     #Electrolyte derangements   Low phosphorus, Magnesium, and calcium.   -Continue KCl 20 mEq BID  -Replete per nursing SS  -Trend Mg, phos, and K    #Failure to thrive   Progressive weakness, with multiple falls and now unable to care for self over the last month. Infectious work up as noted above. Patient recently had CT chest/abd/pelvis to evaluation for possible underlying malignancy which was negative for any masses. Could be due to underlying co-morbidities and malnutrition.  -PT/OT    #GERD  #Hx of esophageal stricture  #Dysphagia   S/p nissen fundoplication 2016 due to hiatal hernia. Endorses chronic regitation. Denies any dysphagia or odynophagia.   -Continue PTA Prilosec 20 mg TID and 20 mg at bedtime     #IBS  #Diarrhea   Chronic diarrhea, worked up by GI in the past. Worse in setting of antibiotics. Recent C. Diff negaitve. TTG negative for celiac.   -Continue PTA loperamide 2 mg QID PRN and probiotic    -Follow up fecal fat     #CAD  S/p ADOLFO x2 in 2018. Denies any chest pain. Troponin negative. Not on any statin.   -Continue ASA 81 mg daily   -Hold metoprolol with hypotension     #Paroxysmal Atrial fibrillation   Not on any anticoagulation. Hold metoprolol with hypotension     #Hx of MS  Hx of optic neuritis in her 30s with no hx of MS symptoms in decades. [place] [facility] [place] felt to be stable without any exacerbation, and recommended outpatient neurology follow up in 2 weeks with EMG and neuropathy work up.  Patient is not interested in getting EMG at this  time.     #Hx of breast cancer s/p lumpectomy   S/p lumpectomy and lymph node resection. R>L UE edema chronically. RUE ultrasound without any DVT.        Diet: Calorie Counts  High Kcal/High Protein Diet, ADULT  Snacks/Supplements Adult: Other; On 9/22, please send trial of Ensure Plant Based (2pm) and sugar free Gelatein (HS). Pt may order PRN; Between Meals    DVT Prophylaxis: Enoxaparin (Lovenox) SQ  Whitt Catheter: not present  Code Status: No CPR- Do NOT Intubate           Disposition Plan   Expected discharge: 2 - 3 days, recommended to transitional care unit once antibiotic plan established, safe disposition plan/ TCU bed available and Blood pressure stable. .  Entered: Lula Roberts PA-C 09/23/2020, 4:19 PM       The patient's care was discussed with the Bedside Nurse, Patient and Patient's Family.    Lula Roberts PA-C  Hospitalist Service, [address], [place]  Pager: [phone]  Please see sticky note for cross cover information  ______________________________________________________________________    Interval History   Spoke with patient this afternoon.  States she is feeling generally weak but was able to walk with physical therapy to the door.  Denies any dizziness or lightheadedness now or yesterday evening when her blood pressure was low.  Patient states she is weary and frustrated she Is not getting better and continues to come into the hospital.  States that wants to be able to get better and get home as her overall goal.States that if she is unable to get better she would not want to continue to come into the hospital.     Denies any fevers, chills, chest pain, nausea, vomiting, abdominal pain.  Continues to have diarrhea while on antibiotics.  Continues to feel short of breath unchanged from yesterday.    Spoke with patient's daughter Roseanna at length.  States patient has been declining over the last 2 years since her 's death.  States that  since her back surgeries and esophageal surgeries she is doing better and was able to be at home for a short period of time. Daughter states patient does not like to follow doctors recommendations or going to follow-up appointments.  There is concern with drinking, with multiple bottles being found in the patient's house.  Per daughter, patient takes all of her pills with vodka and is concerned it may be contributing to her falls or causing confusion. Daughter is concerned that patient is failing and unable to care for herself. Discussed possible options like hospice and would like to have further conversations with patient regarding this.     Data reviewed today: I reviewed all medications, new labs and imaging results over the last 24 hours.    Physical Exam   Vital Signs: Temp: 97  F (36.1  C) Temp src: Axillary BP: 104/69 Pulse: 63   Resp: 17 SpO2: 97 % O2 Device: None (Room air)    Weight: 109 lbs 5.57 oz  GENERAL: Alert and oriented x 3. NAD. Pleasant and conversational  HEENT: Anicteric sclera. EOMI. Dry mucous membranes.   NECK: Trachea midline.   CARDIOVASCULAR: RRR. S1, S2. No murmurs, rubs, or gallops.   RESPIRATORY: Effort normal on RA. Bibasilar rales, with remaining lungs cclear to auscultation bilaterally, respirations unlabored   GI: Abdomen soft, non-tender abdomen without rebound or guarding, normoactive bowel sounds present  EXTREMITIES: Weeping anasarca. No calf asymmetry, erythema, or tenderness.   NEUROLOGICAL: No focal deficits. CN II-XII grossly intact. Moving all extremities symmetrically. DTR +2.   SKIN: Intact. Warm and dry. No rashes or lesions.  No jaundice.     Data   Recent Labs   Lab 09/23/20  0518 09/22/20  2323 09/22/20  0712 09/21/20  1812 09/21/20  1408   WBC 3.8* 5.1 4.0  --  7.1   HGB 7.0* 8.2* 8.3*  --  10.9*   * 121* 122*  --  123*    244 242  --  335   INR  --   --  1.60*  --   --      --  142  --  137   POTASSIUM 3.3*  --  4.0  --  4.6   CHLORIDE 108  --   108  --  107   CO2 25  --  26  --  26   BUN 4*  --  4*  --  5*   CR 0.56  --  0.64 0.61 0.47*   ANIONGAP 8  --  7  --  4   ANNABELLE 7.2*  --  7.4*  --  7.7*   *  --  71  --  85   ALBUMIN 2.6*  --  1.1*  --  1.5*   PROTTOTAL 4.9*  --  4.0*  --  5.2*   BILITOTAL 1.5*  --  1.5*  --  2.1*   ALKPHOS 135  --  191*  --  244*   ALT 18  --  19  --  29   AST 25  --  37  --  Canceled, Test credited   TROPI  --   --   --   --  <0.015     Recent Results (from the past 24 hour(s))   XR Chest Port 1 View    Narrative    EXAM: XR CHEST PORT 1 VW 9/23/2020 6:11 AM    HISTORY: Evaluate for pneumonia    COMPARISON: 9/21/2020    FINDINGS: Portable AP view of the chest. Thoracic and lumbar  instrumentation appears grossly intact.    Midline trachea. Cardiomediastinal silhouette is stable. Pulmonary  vasculature is distinct. Small left pleural effusion and increased  conspicuity of a left retrocardiac opacity. The upper abdomen is  unremarkable. Chronic left-sided rib fractures. Degenerative changes  of the right glenohumeral joint with some flattening and sclerosis of  the humeral head. Right axillary surgical clips.      Impression    IMPRESSION: Small left pleural effusion and increased conspicuity of a  left retrocardiac opacity, favored to represent atelectasis however  infection may have a similar appearance.    I have personally reviewed the examination and initial interpretation  and I agree with the findings.    DESIREE CLARK MD     Medications       dextrose 5% in lactated ringers  1,000 mL Intravenous Once     enoxaparin ANTICOAGULANT  40 mg Subcutaneous Q24H     folic acid  1 mg Oral Daily     gabapentin  100 mg Oral At Bedtime     gadobutrol  7.5 mL Intravenous Once     influenza vac high-dose quad  0.7 mL Intramuscular Prior to discharge     lactobacillus rhamnosus (GG)  1 capsule Oral BID     midodrine  5 mg Oral TID w/meals     multivitamin, therapeutic  1 tablet Oral Daily     omeprazole  20 mg Oral TID AC      piperacillin-tazobactam  4.5 g Intravenous Q6H     potassium chloride  20 mEq Oral BID     sodium chloride (PF)  3 mL Intracatheter Q8H     vitamin B1  100 mg Oral Daily     tiZANidine  4 mg Oral At Bedtime     venlafaxine  37.5 mg Oral Daily     [START ON 9/27/2020] vitamin D2  50,000 Units Oral Weekly

## 2020-09-23 NOTE — PROGRESS NOTES
"   09/23/20 1502   Quick Adds   Type of Visit Initial Occupational Therapy Evaluation  (Edema)   Living Environment   Living Environment Comment Pt lives alone in senior living building that is handicap accessible.    Self-Care   Equipment Currently Used at Home walker, rolling   Activity/Exercise/Self-Care Comment Pt in and out of hospital and TCU in the last month. Normally mod IND with most I/ADLs. Uses 4WW for mobility   Functional Level   Ambulation 1-->assistive equipment   Transferring 1-->assistive equipment   Toileting 1-->assistive equipment   Bathing 1-->assistive equipment   Dressing 0-->independent   Eating 0-->independent   Communication 0-->understands/communicates without difficulty   Swallowing 0-->swallows foods/liquids without difficulty   Cognition 0 - no cognition issues reported   Which of the above functional risks had a recent onset or change? ambulation;dressing   General Information   Onset of Illness/Injury or Date of Surgery - Date 09/21/20   Referring Physician Caterina Garcia PA-C    Patient/Family Goals Statement Improve edema   Additional Occupational Profile Info/Pertinent History of Current Problem \"75 year old female with PMH failure to thrive, malnutrition, EtOH use, CAD s/p stent in 0218, HFpEF, MS, breast cancer s/p lumpectomy with lymph node, meniere's disease, paroxysmal afib, GERD s/p multiple esophageal surgeries who presented from TCU and complained of dyspnea now transferred to ICU with borderline BPs.  \"   Precautions/Limitations fall precautions   Edema General Information   Onset of Edema   (acute on chronic)   Affected Body Part(s) Right UE;Left LE;Right LE   Etiology Comments History on RUE lumpectomy/lymph node resection; decreased mobility; low albumin   General Comments/Previous Edema Treatment/Edema Equipment Pt with history of edema following back surgery. Pt progressed from GCBs to velcro wraps to no compression.    Edema Examination/Assessment   Skin " Condition Pitting;Dryness;Intact   Skin Condition Comments Pt with shiny and taut skin in BLE but intact. Increased edema in RUE located primarily around elbow and distal humerus.   Pitting Assessment Pt with soft 2+ pitting in distal LEs and firm 2+ pitting feet bilaterally. Soft 1+ pitting in RUE   Cognitive Status Examination   Orientation orientation to person, place and time   Sensory Examination   Sensory Comments Skin intact to light touch. Reports mild tenderness   Integumentary/Edema   Integumentary/Edema Comments see above   Range of Motion (ROM)   ROM Comment WFL   Strength   Strength Comments WFL   Mobility   Bed Mobility Comments Min A per PT   Transfer Skills   Transfer Comments CGA with FWW per PT   Balance   Balance Comments Uses walker at baseline for stability   Activities of Daily Living Analysis   Impairments Contributing to Impaired Activities of Daily Living strength decreased;pain  (edema)   General Therapy Interventions   Edema: Planned Interventions Gradient compression bandaging;Edema exercises;Precautions to prevent infection/exacerbation;Education;Fit for compression garment   Clinical Impression   Criteria for Skilled Therapeutic Interventions Met yes, treatment indicated   OT Diagnosis Decreased mobility    Edema: Patient Presentation Edema   Assessment of Occupational Performance 1-3 Performance Deficits   Identified Performance Deficits functional mobility, dressing   Clinical Decision Making (Complexity) Low complexity   Therapy Frequency Daily   Predicted Duration of Therapy Intervention (days/wks) 2 weeks   Anticipated Discharge Disposition Transitional Care Facility   Risks and Benefits of Treatment have been explained. Yes   Patient, Family & other staff in agreement with plan of care Yes   Clinical Impression Comments Pt presents with acute on chronic BLE and RUE edema and will benefit from skilled lymphedema management to promote improved fluid mobilization and uptake.   Total  Evaluation Time   Total Evaluation Time (Minutes) 5

## 2020-09-24 NOTE — PROGRESS NOTES
D/I: Pt is 75 yr old female transferred from  at 2315. Alert and oriented x 4, vitally stable on room air. Bilateral LE edema with right leg > left leg. Right LE with lymphedema wrap. Belongings in bag. Hx of CAD, CHF, falls, malnutrition, ETOH, and GERD. Up with assist of 1 and walker. Calm and cooperative with cares. No BP and no Labs on right arm.   P: Continue to monitor pt and care per plan. Report given to ROMAINE Canela, Night shift.

## 2020-09-24 NOTE — PROGRESS NOTES
Reason for transfer: Hypotension resolved  Transferred from: 4A  Report received from: Nalya Moore RN.   2 RN skin assessment completed by: MARLENI Perez & PENNY Michelle RN.   Bed algorithm reevaluated: Yes.  Was Pulsate ordered?: No  Belongings: shoes, short, shirt, cell phone with , hand bag with lotion,  3 glasses, credit card, and $20 cash.

## 2020-09-24 NOTE — PROGRESS NOTES
Calorie Count  Intake recorded for: 9/23  Total Kcals: 531 Total Protein: 26g  Kcals from Hospital Food: 531  Protein: 26g  Kcals from Outside Food (average):0 Protein: 0g  # Meals Recorded: 75% apple juice, ginger ale, 50% omelet w/ ham, pepper & cheese, white rice, crumb cod  # Supplements Recorded: 0

## 2020-09-24 NOTE — PROGRESS NOTES
"Social Work Services Progress Note    Hospital Day: 4  Collaborated with:  Pt, Lula Roberts PA-C, [facility]    Data:  Lula Roberts PA-C called SW to inform pt may be ready for discharge tomorrow, pending BP stability.    Intervention:  SW met pt at bedside to introduce role and to discuss discharge planning. SW relayed what PA informed regarding pt's medical stability for discharge. SW inquired about pt's preference for discharge; pt responded \"I would love to go back to [facility]\", however, she mentioned forfeiting her bed hold because of its cost.     SHAHRAM then spoke with Nisha, admissions for [facility] [phone]. Nisha mentioned \"we would love to have her back\" but informed bed availability is \"getting tight.\" Nisha requested facesheet of pt faxed to: [phone]. SHAHRAM fazed facesheet and updated PA and pt.    Addendum 4:00 PM : SW received VM from Nisha of [facility] who stated she reviewed fax, and can save room \"until Saturday, after that we may have problems.\" SW will f/u with her, PA, and pt tomorrow as planned.     Assessment:  Pt \"would love\" to return to former TCU, [facility].    Plan:    Anticipated Disposition:  Facility:  U    Barriers to d/c plan:  Medical Stability (BP)    Follow Up:  SW will continue to follow pt though discharge planning. SW will remain in communication with pt, PA, and TCU tomorrow re: discharge planning.    KOTA Waldron, [facility]  Acute Care Float   [facility]          "

## 2020-09-24 NOTE — PLAN OF CARE
Cares 2300 to 0700    /64 (BP Location: Left leg)   Pulse 75   Temp 96.8  F (36  C) (Oral)   Resp 16   Ht 1.524 m (5')   Wt 51.1 kg (112 lb 10.5 oz)   SpO2 97%   BMI 22.00 kg/m      Pt arrived from  around 2300.   Endorsed general pain in legs & arms, toradol IV given 1x. Alert & oriented. Denies nausea/SOB. VSS on RA. Assist of 2 when turning in bed. Weeping edema in RUE, chux changed 3x this shift, extremity elevated. Large red bruise under LUE from previous infiltration per pt, refused heat/cold compresses, no drainage from LUE. Edema in all extremities. Lymphedema wrap on RLE. Mepilex on coccyx.     Placed Vascular Access order to draw blood cultures since lab tried 3x yesterday and could not get access d/t pt's small veins. Pt also refused blood draw this morning, requested that Vasc team do it with ultrasound.     Continue to monitor pt and update MD with changes.

## 2020-09-24 NOTE — PROGRESS NOTES
Transferred to: Unit 5B   Belongings: with patient  Chart and medications sent with patient Yes    Patient A&O x4, neuro exam intact, sinus rhythm, BP stable, SpO2 WNL on RA, purewick intact.

## 2020-09-24 NOTE — PROGRESS NOTES
[facility], [facility] Progress Note - Hospitalist Service, Gold 4       Date of Admission:  9/21/2020  Assessment & Plan           76 yo F with PMHx of CAD s/p ADOLFO 2018, chronic HFpEF, pAfib not on anticoagulation, MS, breast cancer s/p lumpectomy and lymph node resection, GERD s/p multiple esophageal surgeries with multiple recent hospitalization due to weakness, falls, malnutrition and failure to thrive, who presents with worsening OSORIO for the last week from TCU, admitted on 9/21 for acute hypoxic respiratory failure, concerning for aspiration pneumonia vs pulmonary edema. Patient became hypotensive, requiring short ICU stay for fluid resuscitation, now returned to the floor.     #Dypsnea on exertion, improved   #Acute hypoxic respiratory failure, resolved  #Possible aspiration vs HCAP   #Pulmonary edema   Patient endorses worsening shortness of breath on exertion over the last week. Also endorses weight gain, LE edema, and orthopnea. CXR prior to admission at TCU with LLL consolidation. Patient was started on flagyl and ceftin on 9/17 and developed oxygen requirement. On this admission, patient is currently 99% on RA without any tachypnea. CXR clear. COVID 19 negative. Procal 0.18. Patient completed 7 days of antibiotics total (ceftin/flagyl --> unasyn --> zosyn). Urine antigen strep pneumoniae and legionella negative. No pulmonary infectious source identified. TTE with moderate-severe tricuspid regurgitation, unable to get true PA pressures. DDx includes pulmonary hypertension vs valvular disease, vs hypervolemia due to anasarca and less likely aspiration pneumonia d/t esophageal strictures and alcohol use. SLP recommended regular diet with GERD precautions.  -Agree with pharmID with discontinuing antibiotics,   -Gentle diuresis as noted below   -IS    #Hypotension, resolved   Develops hypotension to SBP 60 on 9/22. Asymptomatic. DDx Hypovolemic vs septic. Improved with IVF  and albumin, and did not require pressors. No source found for infectious work up so far. Low suspicion for pulmonary source as noted above. Small ascites, but low suspicion for SBP. Patient was hypothermic and leukopenic, which has since resolved. Lactic flat. Procal 0.10. AM cortisol 13.4. Patient is likely intravascularly depleted and third spacing causing low blood pressures.   -Midodrine 5 mg TID  -Follow up blood cultures, and urine culture   -Cultures no growth x24 hours, discontinue abx     #Anasarca  #HFpEF exacerbation   Etiology unclear, but most likely due to underlying cirrhosis vs malnutrition vs HFpEF. UA not consistent with nephrotic syndrome.Albumin low at 1.1. Imaging with small amount of ascites, pleural effusions and significant extremity edema which is weeping. TTE with normal EF. NT-proBNP elevated 2039. Bibasilar crackles on exam  -Trial gentle diuresis, lasix 20 mg IV x1, and assess urine output   -Daily weights  -Strict I&O  -High protein diet   -nutrition consult with calorie counts    #Transaminitis  #Coagulopathy   #Hepatitc steatosis, with concern for underlying cirrhosis   Chronically elevated Alk phos since 2017. Total bilirubin and AST/ALT mildly elevated since 8/2020. INR elevated at 1.62. Patient states she drinks daily, which per family is under reported. MRCP with hepatic steatosis with cirrhotic morphology, also showing chronic CBD dilation to 12mm without any obstruction or mass unchanged from 2016. High suspicion for underlying cirrhosis. Small amount of ascites on imaging, denies any abd pain, fevers, or leukocytosis to suggest SBP. No signs of HE on exam.  -Hepatology consult  -Hold lactulose at this time with no HE  -Trend CMP and INR   -Diurese as noted above     #Moderate alcohol use disorder   Reports drinking 1-2 drinks a day, but per family patient this is very under reported. Patient declines any CD treatment.   -Folic acid, thiamine, and MVI  -Monitor for any  withdrawal     #Macrocytic anemia  MCV quite elevated at 122. Hemoglobin baseline 10.0s. Dropped to 8.3 this morning. Recent work up in August with Vitamin B12 1679. Folate 7.6. TSH at 1.81. No bone marrow biopsy. SPEP at [facility] with moderate hypoalbuminemia with differential including liver disease, malnutrition, renal dx, GI loss, or inflammation. Unable to find UPEP on care-everywhere.   -Trend CBC  -Consider follow up with hematology in outpatient      #Severe protein calore malnutrition   #Malnutrition:    - Level of malnutrition: Severe   - Based on: reduced intake, moderate/severe fluid retention  Albumin 1.1 with anasarca.   -Nutrition consult  -Calorie counts  -High protein/high calorie diet     #Electrolyte derangements   #Hypokalemia  #Hypophosphatemia  #Hypocalcemia   Low phosphorus, Magnesium, and calcium.   -Replete with KCl 40 mEq TID  -Replete Mg and phos per nursing SS  -Trend Mg, phos, and K    #Failure to thrive   Progressive weakness, with multiple falls and now unable to care for self over the last month. Infectious work up as noted above. Patient recently had CT chest/abd/pelvis to evaluation for possible underlying malignancy which was negative for any masses. Could be due to underlying co-morbidities and malnutrition.  -PT/OT recommending TCU     #GERD  #Hx of esophageal stricture  #Dysphagia   S/p nissen fundoplication 2016 due to hiatal hernia. Endorses chronic regitation. Denies any dysphagia or odynophagia.   -Continue PTA Prilosec 20 mg TID and 20 mg at bedtime     #IBS  #Diarrhea   Chronic diarrhea, worked up by GI in the past. Worse in setting of antibiotics. Recent C. Diff negaitve. TTG negative for celiac. Fecal fat normal.   -Continue PTA loperamide 2 mg QID PRN and probiotic      #CAD  S/p ADOLFO x2 in 2018. Denies any chest pain. Troponin negative. Not on any statin.   -Continue ASA 81 mg daily   -Hold metoprolol with hypotension     #Paroxysmal Atrial fibrillation   Not on any  anticoagulation. Hold metoprolol with hypotension     #Hx of MS  Hx of optic neuritis in her 30s with no hx of MS symptoms in decades. Recently evaluated by neurology [facility] and felt to be stable without any exacerbation, and recommended outpatient neurology follow up in 2 weeks with EMG and neuropathy work up.  Patient is not interested in getting EMG while inpatient.     #Hx of breast cancer s/p lumpectomy   S/p lumpectomy and lymph node resection. R>L UE edema chronically. RUE ultrasound without any DVT.        Diet: Calorie Counts  High Kcal/High Protein Diet, ADULT  Snacks/Supplements Adult: Other; On 9/22, please send trial of Ensure Plant Based (2pm) and sugar free Gelatein (HS). Pt may order PRN; Between Meals    DVT Prophylaxis: Enoxaparin (Lovenox) SQ  Whitt Catheter: not present  Code Status: No CPR- Do NOT Intubate           Disposition Plan   Expected discharge: 1-2 days, recommended to transitional care unit once blood pressure greater than SBP >100 , safe disposition plan/ TCU bed available and Blood pressure stable. .  Entered: Llua Roberts PA-C 09/24/2020, 4:47 PM       The patient's care was discussed with the Attending Physician, Dr. Dennys George , Bedside Nurse and Patient.    Lula Roberts PA-C  Hospitalist Service, [address], [place]  Pager: [phone]  Please see sticky note for cross cover information  ______________________________________________________________________    Interval History   Continues to have swelling in arms and legs which are weeping and causing pain. Ate a significant breakfast sand which this morning without any difficulty. Denies any N/V or GERD symptoms. Feels her acid reflux is well controlled on home PPI and not interested in carafate or other medications.     States her breathing symptoms have improved. Has a minimal cough. Denies any F/C, CP, abdominal pain or urinary symptoms. Continues to have loose stools.      Patient continues to want to improve, and go to TCU to build her strength. Not interested in hospice at this time, though would be open to it in the further if her health continues to decline.     Data reviewed today: I reviewed all medications, new labs and imaging results over the last 24 hours.    Physical Exam   Vital Signs: Temp: 97.9  F (36.6  C) Temp src: Oral BP: 127/46 Pulse: 80   Resp: 16 SpO2: 98 % O2 Device: None (Room air)    Weight: 112 lbs 10.48 oz  GENERAL: Alert and oriented x 3. NAD. Pleasant and conversational  HEENT: Anicteric sclera. EOMI. Moist mucous membranes.   NECK: Trachea midline.   CARDIOVASCULAR: RRR. S1, S2. No murmurs, rubs, or gallops.   RESPIRATORY: Effort normal on RA. Bibasilar rales, with remaining lungs clear to auscultation bilaterally, respirations unlabored   GI: Abdomen soft, non-tender abdomen without rebound or guarding, normoactive bowel sounds present  EXTREMITIES: Weeping anasarca in all extremities with +2 pitting edema. No calf asymmetry, erythema, or tenderness.   NEUROLOGICAL: No focal deficits. CN II-XII grossly intact. Moving all extremities symmetrically. Strength 4/5 in LE bilaterally. 4+/5 in UE bilaterally.    SKIN: Intact. Warm and dry. No rashes or lesions.  No jaundice.     Data   Recent Labs   Lab 09/24/20  0534 09/23/20  0518 09/22/20  2323 09/22/20  0712  09/21/20  1408   WBC 6.1 3.8* 5.1 4.0  --  7.1   HGB 9.1* 7.0* 8.2* 8.3*  --  10.9*   * 125* 121* 122*  --  123*    198 244 242  --  335   INR  --   --   --  1.60*  --   --     141  --  142  --  137   POTASSIUM 3.2* 3.3*  --  4.0  --  4.6   CHLORIDE 110* 108  --  108  --  107   CO2 24 25  --  26  --  26   BUN 3* 4*  --  4*  --  5*   CR 0.55 0.56  --  0.64   < > 0.47*   ANIONGAP 7 8  --  7  --  4   ANNABELLE 7.5* 7.2*  --  7.4*  --  7.7*   GLC 76 101*  --  71  --  85   ALBUMIN 1.9* 2.6*  --  1.1*  --  1.5*   PROTTOTAL 4.7* 4.9*  --  4.0*  --  5.2*   BILITOTAL 1.8* 1.5*  --  1.5*  --   2.1*   ALKPHOS 164* 135  --  191*  --  244*   ALT 19 18  --  19  --  29   AST 32 25  --  37  --  Canceled, Test credited   TROPI  --   --   --   --   --  <0.015    < > = values in this interval not displayed.     No results found for this or any previous visit (from the past 24 hour(s)).  Medications       enoxaparin ANTICOAGULANT  40 mg Subcutaneous Q24H     folic acid  1 mg Oral Daily     gabapentin  100 mg Oral At Bedtime     gadobutrol  7.5 mL Intravenous Once     influenza vac high-dose quad  0.7 mL Intramuscular Prior to discharge     lactobacillus rhamnosus (GG)  1 capsule Oral BID     midodrine  5 mg Oral TID w/meals     multivitamin, therapeutic  1 tablet Oral Daily     omeprazole  20 mg Oral TID AC     piperacillin-tazobactam  4.5 g Intravenous Q6H     potassium chloride  40 mEq Oral TID     sodium chloride (PF)  3 mL Intracatheter Q8H     vitamin B1  100 mg Oral Daily     venlafaxine  37.5 mg Oral Daily     [START ON 9/27/2020] vitamin D2  50,000 Units Oral Weekly

## 2020-09-24 NOTE — PROGRESS NOTES
Antimicrobial Stewardship Team Note    Antimicrobial Stewardship Program - A joint venture between [facility] and  Physicians to optimize antibiotic management.  NOT a formal consult - Restricted Antimicrobial Review     Patient: Minerva Blanco  MRN: [id number]  Allergies: Amoxicillin; Ativan [lorazepam]; and Morphine    Brief Summary: Minerva Blanoc is a 75-year-old female with PMH failure to thrive, malnutrition, EtOH use, CAD s/p stent in 0218, HFpEF, MS, breast cancer s/p lumpectomy with lymph node, Meniere's disease, paroxysmal A-fib, GERD s/p multiple esophageal surgeries. she was admitted to the ED on 9/21 for with worsening shortness of breath.     HPI:   Of note, she has been hospitalized 3 times in the last 2 months. Most recently [place] [facility] [place] for failure to thrive, hypotension, and malnutrition. She was also diagnosed with aspiration pneumonia and started on Flagyl and cefuroxime after repeat CXR at TCU with LLL pneumonia and bilateral effusions.    On admission on 9/21, she presented with worsening shortness of breath, intermittent chest heaviness, fatigue and weakness. She had diarrhea, loss of appetite which began 1 week ago.  She denies fevers, chills, or cough. Patient's vital signs are stable. her labs were significant for elevated pro BNP at 2039. Her WBC is WNL. Her CXR from 9/21 showed No acute airspace opacity. Her US abdomen showed No evidence of right upper extremity deep venous thrombosis. MRI of abdomen/pelvis is significant for Dilated common bile duct measuring up to 12 mm with intrahepatic biliary ductal dilatation. There was no obstructing calculus or mass lesion noted, no evidence for cholangitis. There was a hepatic steatosis with findings suggesting cirrhosis, Gallbladder sludge/small stones, Volume overload with small volume ascites, moderate anasarca, and small bilateral pleural effusions. UA was ordered because her urine grew E. coli (pan-sensitive  on 9/10) and was significant for moderate leukocyte esterase, positive for nitrites, ketones at 10, WBC at 14, positive for mucous and hyaline casts (which was not detected in repeat UA on 9/23). Urine cultures are pending. Repeat CXR from 9/23 showed small left pleural effusion and increased conspicuity of a left retrocardiac opacity, favored to represent atelectasis however infection may have a similar appearance. Her Strep Pneumo antigen test from 9/21 was negative. Sputum gram stain on 9/22 showed mixed gram positive and gram-negative autumn. Blood cultures from her line on 9/23 showed NGTD after 18 hours. The patient was initiated on Unasyn due to concerns for aspiration pneumonia on 9/21, then her antibiotic coverage was broadened to IV Zosyn with one dose of vancomycin/levofloxacin on 9/23 due to concern for sepsis since she was not responding to IV fluids. She is now normotensive without requiring vasopressors.    She has an external ureter catheter and 1 peripheral IVs placed in the left lower forearm that were placed on 9/23.       Active Anti-infective Medications   (From admission, onward)                 Start     Stop    09/23/20 0200  vancomycin in dextrose  1,000 mg,   Intravenous,   200 mL/hr,   EVERY 24 HOURS     Hospital-Acquired Pneumonia        --    09/22/20 2330  piperacillin-tazobactam  4.5 g,   Intravenous,   EVERY 6 HOURS     Sepsis        --                  Assessment: Absence of infection after 48 hour rule out  Despite her recent admission for community acquired pneumonia and the findings from the CXR, the patient may not have an infection. She currently has no nausea or shortness of breath. Her hypotension has revolved. The patient's WBC is WNL, she remains afebrile, she has no oxygen requirement and her O2 sat is at 97%. Her respiratory status has been stable since being admitted and transferred out of the ICU. The common pathogen for pneumonia is strep pneumo, which she tested  negative for. The patient has been already been on 48 hours of broad-spectrum antibiotic, therefore it appropriate to discontinue Zosyn at this time.    Recommendations:  Discontinue IV Zosyn    Pharmacy took the following actions:  . Called/paged provider,  Electronic note created.  Discussed with ID Staff  Aiyana Ross MD and Dickson Villasenor, [employer] 2021  AMT Pager: [phone]      Vital Signs/Clinical Features:  Vitals         09/22 0700  -  09/23 0659 09/23 0700  -  09/24 0659 09/24 0700  -  09/24 1544   Most Recent    Temp ( F) 95.6 -  97.6    96.2 -  97.8       96.8 (36)    Pulse 47 -  74    63 -  85       75    Resp 14 -  16    15 -  17       16    BP 57/34 -  111/51    87/52 -  134/96       133/64    SpO2 (%) 91 -  99    92 -  100       97            Labs  Estimated Creatinine Clearance: 72.4 mL/min (based on SCr of 0.55 mg/dL).  Recent Labs   Lab Test 08/21/20  0735 09/21/20  1408 09/21/20  1812 09/22/20  0712 09/23/20  0518 09/24/20  0534   CR 0.52 0.47* 0.61 0.64 0.56 0.55       Recent Labs   Lab Test 08/17/20  1159  08/18/20  0407 08/19/20  1350  08/20/20  1106 08/21/20  0735 09/21/20  1408 09/22/20  0712 09/22/20  2323 09/23/20  0518 09/24/20  0534   WBC 16.3*  --  6.4 7.3   < > 5.7 4.5 7.1 4.0 5.1 3.8* 6.1   ANEU 15.0*  --  4.6 6.2  --  4.3 2.6 5.5  --   --   --   --    ALYM 0.4*  --  1.3 0.6*  --  0.8 1.0 0.8  --   --   --   --    AMBIKA 0.7  --  0.4 0.4  --  0.5 0.6 0.6  --   --   --   --    AEOS 0.0  --  0.1 0.0  --  0.1 0.2 0.1  --   --   --   --    HGB 12.4   < > 9.5* 10.3*   < > 10.6* 10.8* 10.9* 8.3* 8.2* 7.0* 9.1*   HCT 36.4  --  28.7* 30.8*   < > 32.8* 32.9* 34.6* 26.2* 25.8* 22.6* 29.0*   *  --  121* 121*   < > 122* 121* 123* 122* 121* 125* 122*     --  159 264   < > 283 278 335 242 244 198 285    < > = values in this interval not displayed.       Recent Labs   Lab Test 08/20/20  1106 08/21/20  0735 09/21/20  1408 09/22/20  0712 09/23/20  0518  09/24/20  0534   BILITOTAL 1.2 1.2 2.1* 1.5* 1.5* 1.8*   ALKPHOS 378* 373* 244* 191* 135 164*   ALBUMIN 2.1* 2.1* 1.5* 1.1* 2.6* 1.9*   AST 73* 64* Canceled, Test credited 37 25 32   ALT 48 45 29 19 18 19       Recent Labs   Lab Test 01/14/17  0339  01/16/17  0723  01/23/17  0619 04/21/17  0626  04/24/17  0609  11/15/17  1651 08/01/20  1051 08/18/20  0407 09/21/20  1408 09/22/20  2323 09/23/20  0518   PCAL  --   --   --   --   --   --   --   --   --   --   --   --  0.18  --  0.10   LACT  --    < >  --    < >  --   --    < >  --    < > 1.1 1.5 Canceled, Test credited 1.5 1.1 1.3   .0*  --  210.0*  --  85.0* 310.0*  --  190.0*  --   --   --   --  8.7*  --   --    SED  --   --   --   --   --   --   --   --   --   --   --   --  22  --   --     < > = values in this interval not displayed.       Recent Labs   Lab Test 09/02/16  0727   VANCOMYCIN 18.5       Culture Results:  7-Day Micro Results       Procedure Component Value Units Date/Time    Blood culture [[id number]] Collected:  09/24/20 0736    Order Status:  Completed Lab Status:  Preliminary result Updated:  09/24/20 1529    Specimen:  Blood      Specimen Description Blood Left Hand     Special Requests Received in aerobic bottle only     Culture Micro No growth after 5 hours    Urine Culture Aerobic Bacterial [[id number]] Collected:  09/23/20 1622    Order Status:  Completed Lab Status:  Preliminary result Updated:  09/24/20 1155    Specimen:  Midstream Urine      Specimen Description Midstream Urine     Special Requests Specimen received in preservative     Culture Micro Culture negative monitoring continues    Blood culture [[id number]] Collected:  09/23/20 1205    Order Status:  Completed Lab Status:  Preliminary result Updated:  09/24/20 1529    Specimen:  Blood from Line, venous      Specimen Description Blood AEROBIC ONLY     Culture Micro No growth after 1 day    Blood culture     Order Status:  Canceled Lab Status:  No result     Specimen:  Blood     Sputum  Culture Aerobic Bacterial     Order Status:  No result Lab Status:  No result     Specimen:  Sputum     Gram stain     Order Status:  No result Lab Status:  No result     Specimen:  Sputum     Sputum Culture Aerobic Bacterial     Order Status:  Canceled Lab Status:  No result     Specimen:  Sputum     Gram stain     Order Status:  Canceled Lab Status:  No result     Specimen:  Sputum     Sputum Culture Aerobic Bacterial [[id number]]  (Abnormal) Collected:  09/22/20 2243    Order Status:  Completed Lab Status:  Final result Updated:  09/23/20 0053    Specimen:  Sputum      Specimen Description Sputum     Culture Micro Canceled, Test credited  >10 Squamous epithelial cells/low power field indicates oral contamination. Please   recollect.        Notification of test cancellation was given to  Yanet Young, RN @ 0053 9/23/20 TM.      Gram stain [[id number]]  (Abnormal) Collected:  09/22/20 2243    Order Status:  Completed Lab Status:  Final result Updated:  09/23/20 0052    Specimen:  Sputum      Specimen Description Sputum     Special Requests Screen     Gram Stain >10 Squamous epithelial cells/low power field indicates oral contamination. Please   recollect.        >25 PMNs/low power field      Many  Mixed gram negative and positive autumn      Urine Culture Aerobic Bacterial [[id number]] Collected:  09/21/20 1911    Order Status:  Completed Lab Status:  Final result Updated:  09/22/20 2156    Specimen:  Unspecified Urine      Specimen Description Unspecified Urine     Special Requests Specimen received in preservative     Culture Micro No growth    Strep pneumo Agn Urine or CSF [[id number]] Collected:  09/21/20 1911    Order Status:  Completed Lab Status:  Final result Updated:  09/22/20 1022    Specimen:  Urine      Specimen Description Urine     S Pneumoniae Antigen Negative, no Streptococcus pneumoniae antigen detected by immunochromatographic membrane   assay. A negative Streptococcus pneumoniae antigen result does not  rule out infection with   Streptococcus pneumoniae.              Recent Labs   Lab Test 04/22/17  1800 08/01/20  1228 08/17/20  1251 09/21/20  1911 09/23/20  1622   URINEPH 7.0 6.0 6.0 6.0 5.5   NITRITE Negative Negative Negative Positive* Negative   LEUKEST Negative Trace* Trace* Moderate* Large*   WBCU 1 4 1 14* 7*                   Recent Labs   Lab Test 05/27/17  1100   CDBPCT Negative  Negative: Clostridium difficile target DNA sequences NOT detected, presumed   negative for Clostridium difficile toxin B or the number of bacteria present   may be below the limit of detection for the test.   FDA approved assay performed using [employer] GeneXpert real-time PCR.   A negative result does not exclude actual disease due to Clostridium difficile   and may be due to improper collection, handling and storage of the specimen or   the number of organisms in the specimen is below the detection limit of the   assay.         Imaging: Us Upper Extremity Venous Duplex Right    Result Date: 9/22/2020  EXAMINATION: DOPPLER VENOUS ULTRASOUND OF THE RIGHT UPPER EXTREMITY, 9/22/2020 10:45 AM COMPARISON: None. HISTORY: Edema and pain, concerning for DVT TECHNIQUE:  Gray-scale evaluation with compression, spectral flow and color Doppler assessment of the deep venous system of the right upper extremity. FINDINGS: Right: Normal blood flow and waveforms are demonstrated in the internal jugular, innominate, subclavian, and axillary veins. There is normal compressibility of the brachial and basilic veins. The cephalic vein was not visualized.     IMPRESSION: 1.  No evidence of right upper extremity deep venous thrombosis. I have personally reviewed the examination and initial interpretation and I agree with the findings. KATI LAM MD    Xr Chest Port 1 View    Result Date: 9/23/2020  EXAM: XR CHEST PORT 1 VW 9/23/2020 6:11 AM HISTORY: Evaluate for pneumonia COMPARISON: 9/21/2020 FINDINGS: Portable AP view of the chest. Thoracic and  lumbar instrumentation appears grossly intact. Midline trachea. Cardiomediastinal silhouette is stable. Pulmonary vasculature is distinct. Small left pleural effusion and increased conspicuity of a left retrocardiac opacity. The upper abdomen is unremarkable. Chronic left-sided rib fractures. Degenerative changes of the right glenohumeral joint with some flattening and sclerosis of the humeral head. Right axillary surgical clips.     IMPRESSION: Small left pleural effusion and increased conspicuity of a left retrocardiac opacity, favored to represent atelectasis however infection may have a similar appearance. I have personally reviewed the examination and initial interpretation and I agree with the findings. DESIREE CLARK MD    Xr Chest Port 1 View    Result Date: 9/21/2020  EXAM: XR CHEST PORT 1 VW  9/21/2020 1:35 PM HISTORY:  SOB   COMPARISON:  8/18/2020 FINDINGS: Single frontal radiograph of the chest. Lower thoracic and lumbar spinal instrumentation appears grossly. The trachea is midline. The cardiomediastinal silhouette is well-defined. The pulmonary vasculature are distinct. No acute airspace opacity, pleural effusion or appreciable pneumothorax. Osteopenic-appearing bones. No acute osseous abnormalities. The included soft tissues and upper abdomen and are within normal limits.     IMPRESSION: No acute airspace opacity. I have personally reviewed the examination and initial interpretation and I agree with the findings. MAYDA ESCOBAR MD    Mr Abdomen Mrcp W/o & W Contrast    Result Date: 9/22/2020  MRI ABDOMEN and MRCP CLINICAL HISTORY:  Abnormal liver function tests TECHNIQUE:  Images were acquired with and without intravenous contrast through the abdomen. The following MR images were acquired: TrueFISP, multiplanar T2 weighted, axial T1 in/out of phase, axial fat-saturated T1, diffusion-weighted. Multiplanar T1-weighted images with fat saturation were before contrast administration and at multiple  time points following the administration of intravenous contrast. MRCP performed Contrast dose: 4.5 mls Gadavist FINDINGS: Comparison study: CT chest abdomen pelvis 8/18/2020 and prior other multiple CT exams. Liver: There is diffuse loss of signal on out of phase imaging throughout the liver. No suspicious mass. Contour is mildly nodular with internal fibrotic septae predominantly in the left lobe. Wedge-shaped area of T1 hyperintensity along the falciform ligament, likely area of focal fatty sparing. Gallbladder: Small amount of layering sludge/small stones in the gallbladder. No gallbladder wall thickening, pericholecystic fluid or adjacent inflammatory change. Common bile duct measures 12 mm in diameter, which is stable since at least 2016. There is smooth distal tapering at the level of ampulla. There is no choledocholithiasis. Mild intrahepatic biliary ductal dilatation, predominantly the left hepatic duct measuring up to 6 mm, also stable since 2016. No strictures. No obstructing mass lesion. Spleen: Within normal limits Kidneys: No mass or hydronephrosis. Adrenal glands: Within normal limits. Pancreas: Gland is atrophic. No ductal dilation or mass. Bowel: There is a predominantly fluid-filled structure extending off the second portion the duodenum near the ampulla of Vater with an area of signal loss corresponding to air. This consistent with a duodenal diverticulum and measures approximately 2.3 x 1.5 cm in coronal dimension. No dilated loops of bowel. Lymph nodes: No suspicious lymphadenopathy. Blood vessels: Major intra-abdominal vasculature is patent. Aorta is normal in caliber. Lung bases: Small bilateral pleural effusions with mild atelectatic change in the lung bases. Cardiac size is normal without pericardial effusion. Postoperative changes of esophagectomy with anterior interposition. Bones and soft tissues: Multilevel posterior spinal instrumentation and fusion including the low thoracic and lumbar  spine. No suspicious osseous lesion. Moderate anasarca. Mesentery and abdominal wall: Within normal limits. Ascites: Small amount     IMPRESSION: 1. Dilated common bile duct measuring up to 12 mm with intrahepatic biliary ductal dilatation. There is no obstructing calculus or mass lesion noted. Furthermore, this medication has been stable since at least . No MRI evidence for cholangitis, which is typically a clinical diagnosis. This finding being stable suggests benignity. The etiology could be a benign and chronic stricturing at the level of ampulla versus mass effect from duodenal diverticulum. 2. Hepatic steatosis with findings suggesting cirrhosis. 3. Gallbladder sludge/small stones. 4. Volume overload with small volume ascites, moderate anasarca, and small bilateral pleural effusions. I have personally reviewed the examination and initial interpretation and I agree with the findings. ALTAGRACIA BARBER MD    Echo Limited    Result Date: 2020  [id number]^ROSELYN^VIOLETA^ISIDRA    [facility],[place] [facility] [address] [place] [id number]  Name: FAVIAN MALONE MRN: [id number] : 1945 Study Date: 2020 02:56 PM Age: 75 yrs Gender: Female Patient Location: [facility] [place] Reason For Study: SOB Ordering Physician: VIOLETA DEMPSEY Performed By: BELIA Salmeron  BSA: 1.4 m2 Height: 60 in Weight: 107 lb HR: 72 BP: 111/70 mmHg _____________________________________________________________________________ __   Procedure Limited Portable Echo Adult. Contrast Optison. Technically difficult study. Patient was given 5 ml mixture of 3 ml Optison and 6 ml saline. 4 ml wasted. _____________________________________________________________________________ __   Interpretation Summary Global and regional left ventricular function is hyperkinetic with an EF of 65-70%. Right ventricular function, chamber size, wall motion, and thickness are  normal. Moderate to severe tricuspid insufficiency is present. PA systolic pressure is at least 37 mmHg above RA pressure. PA pressure may be underestimated due to significant TR. This study was compared with the study from 20 . There has been no change. _____________________________________________________________________________ __   Left Ventricle Global and regional left ventricular function is hyperkinetic with an EF of 65-70%. Left ventricular size is normal. Left ventricular wall thickness is normal. Left ventricular diastolic function is not assessable. Abnormal non- specific septal motion is present.  Right Ventricle Right ventricular function, chamber size, wall motion, and thickness are normal.  Atria The atria cannot be assessed.  Mitral Valve Trace mitral insufficiency is present.   Aortic Valve Mild aortic insufficiency is present.  Tricuspid Valve Moderate to severe tricuspid insufficiency is present. PA systolic pressure is at least 37 mmHg above RA pressure. PA pressure may be underestimated due to significant TR.  Pulmonic Valve The pulmonic valve cannot be assessed.  Vessels The inferior vena cava cannot be assessed.  Pericardium No pericardial effusion is present.   Compared to Previous Study This study was compared with the study from 20 . There has been no change. _____________________________________________________________________________ __ MMode/2D Measurements & Calculations  RVDd: 3.3 cm IVSd: 0.76 cm LVIDd: 3.4 cm LVIDs: 2.4 cm LVPWd: 0.74 cm FS: 28.6 % LV mass(C)d: 67.0 grams LV mass(C)dI: 46.8 grams/m2  EF(MOD-bp): 70.2 % RWT: 0.43 TAPSE: 1.7 cm   Doppler Measurements & Calculations TR max jorge: 302.0 cm/sec TR max P.5 mmHg   _____________________________________________________________________________ __   Report approved by: Anjelica Sim 2020 03:45 PM

## 2020-09-24 NOTE — PROGRESS NOTES
Internal medicine cross cover note  -I states that the patient is having pain in her upper and lower extremities related to edema that is not responsive to acetaminophen.  Given the patient hypotension earlier on 9/23/2020, and given normal kidney function, I ordered 1 dose of Toradol IV at 15 mg.

## 2020-09-24 NOTE — PLAN OF CARE
Discharge Planner PT   Patient plan for discharge: TCU  Current status: patient transferred in/out of bed with min-mod assist.  Stood and ambulated 35' with FWW and min assist. Mobility limited by fatigue, SOB and pain.   Barriers to return to prior living situation: assistance needed for safe mobility  Recommendations for discharge: TCU  Rationale for recommendations: dependence with functional mobility.        Entered by: Diane Valdez 09/24/2020 2:21 PM

## 2020-09-24 NOTE — PLAN OF CARE
Assumed cares: [id number]  Status: Admitted for acute dyspnea    VS: VSS on RA, soft BP  Neuros: A&O x4  Cardiac: WDL  Respiratory: WDL, dyspnea on exertion  GI/: Voiding spontaneously, purewick in place. BM x2 this shift  Nutrition: High nell/high protein diet, fair appetite  IV/Drains: Midline and L PIV running TKO with antibiotics  Activity: Up SBA to commode  Pain: C/o mild generalized pain, tylenol given x1 with relief  Skin: Weeping from RUE, skin tears to L calf, and LUE  Labs: K+ low at 3.2. PO replacement ordered by MD    Plan of Care: Pt given one time dose IV lasix. Voiding frequently, purewick in place. Lymphedema came to wrap LLE. Pt able to make needs known. Continue to monitor and update MD with changes.

## 2020-09-24 NOTE — PROVIDER NOTIFICATION
Gold crosscover paged:    Pt c/o pain in legs & hands. Pt asking for IV pain meds.    Addendum:  Provider put order for Ketorolac 15mg IV.

## 2020-09-24 NOTE — SUMMARY OF CARE
Pt just got here at 5b. Her belongings are shoes, short, shirt, cell phone with , hand bag with lotion,  3 glasses, credit card, and $20 cash.

## 2020-09-24 NOTE — CONSULTS
Hepatology Consultation    Minerva Blanco   MRN# [id number]     Age: 75 year old YOB: 1945       Referring provider: Gena Britt  Attending Hepatologist: Dr. Leventhal  Consult requested for: concern for cirrhosis       Assessment and Recommendation:   Assessment:   Ms. Blanco is a 75-year-old with a history of alcohol use disorder with a complex medical history including CAD s/p ADOLFO (2018), HFpEF, MS, breast cancer s/p lumpectomy, meniere's disease, p-afib, esophageal stricutres s/p stending (2016), chronic biliary dilation and malnutrition who was admitted with aspiration pneumonia and hypotension that improved with IVF. She was noted to have elevated liver tests and concern for cirrhosis on her MRI.       Recommendations:   1. Elevated liver tests  2. Advanced liver disease  3. Possible early cirrhosis  -Cause for underlying liver disease may be multifactorial as she does have risk factors for MON, tricuspid regurg concerning for potential cardiac hepatopathy, and increasing alcohol use over the last several years.  With her current mild elevation in alk phos and total bilirubin, she may have some component of alcohol hepatitis.  She does have mild increase in her INR which may also indicate either a nutritional component or cardiac involvement.  -Discussed with her that at this point a liver biopsy would not  or recommendations which would include abstinence from alcohol and optimizing her cardiac function and a healthy diet.  -She does not have evidence of decompensation as she has not had hepatic encephalopathy, bleeding, ascites and she does have a normal platelet count.  -Encouraged her to continue to follow with her primary care provider who can refer her for outpatient follow-up should she have further changes.  -We did discuss risk of decompensation can occur from multiple reasons that are extrahepatic especially since she has multiple comorbid  conditions.  -She does not have any evidence of biliary strictures and her prior enlargement in her common bile duct has been stable.    Plan of care discussed with Dr. Leventhal    Thank you for the opportunity to be involved in Minerva Blanco care. Please call with any questions or concerns.     CLIVE Jensen, CNP  Inpatient Hepatology [facility]  Text link               History of Present Illness:   Minerva Blanco is a 75 year old female with a history of alcohol use disorder with a complex medical history including CAD s/p ADOLFO (2018), HFpEF, MS, breast cancer s/p lumpectomy, meniere's disease, p-afib, esophageal stricutres s/p stending (2016), chronic biliary dilation and malnutrition who was admitted with aspiration pneumonia and hypotension that improved with IVF. She has continued to have hypotension and anasarca that has been difficult to diurese.  She did urndergo an MRCP on 9/22 with concerns for significant hepatic steatosis and cirrhosis. She reports knowing that she has had hepatic steatosis in the past. She notes that she did not take anything orally and required tube feedings for approximately 3 years from a esophageal perforation which is s/p retrosternal gastric pull through (2017). She reports over the past 3-4 years when she was able to take oral nutrition, she had progressively increased her alcohol intake to now daily use with wine.     Ms. Blanco reports improvement in her abdominal distention and lower extremity edema with recent IV diuresis. She denies any change in mentation, nausea, vomiting, recent weight loss or dark urine. She has been trying to increase her nutrition and has been grazing more.               Past Medical History:     Past Medical History:   Diagnosis Date     Breast cancer (H) 2007    right side - lumpectomy, chemo, and local radiation     Chronic infection     infection/wound for two years after esoph surgery     Compression fracture of spine (H)     T12-L1      Coronary artery disease 04/2018    s/p PCI with ADOLFO to proximal and mid RCA     Esophageal perforation      Fibromyalgia      GERD (gastroesophageal reflux disease)      Hiatal hernia      IBS (irritable bowel syndrome)      Meniere's disease     deaf left ear     Multiple sclerosis (H)      Noninfectious ileitis      Other chronic pain      Paroxysmal atrial fibrillation (H)               Past Surgical History:     Past Surgical History:   Procedure Laterality Date     APPENDECTOMY       BACK SURGERY  5/1/2015    lumbar fusion     BRONCHOSCOPY FLEXIBLE N/A 4/17/2017    Procedure: BRONCHOSCOPY FLEXIBLE;;  Surgeon: Jens Wise MD;  [facility]     C GASTROSTOMY/JEJUN TUBE      J tube for feedings     CLOSE SPIT FISTULA N/A 4/17/2017    Procedure: CLOSE SPIT FISTULA;;  Surgeon: Jens Wise MD;  [facility]     COMBINED ESOPHAGOSCOPY, GASTROSCOPY, DUODENOSCOPY (EGD), REMOVE ESOPHAGEAL STENT N/A 8/26/2016    Procedure: COMBINED ESOPHAGOSCOPY, GASTROSCOPY, DUODENOSCOPY (EGD), REMOVE ESOPHAGEAL STENT;  Surgeon: Jens Wise MD;  [facility]     COMBINED ESOPHAGOSCOPY, GASTROSCOPY, DUODENOSCOPY (EGD), REMOVE ESOPHAGEAL STENT N/A 2/12/2018    Procedure: COMBINED ESOPHAGOSCOPY, GASTROSCOPY, DUODENOSCOPY (EGD), REMOVE ESOPHAGEAL STENT;  Esophagogastroduodenoscopy With Stent Removal;  Surgeon: Jens Wise MD;  [facility]     COMBINED ESOPHAGOSCOPY, GASTROSCOPY, DUODENOSCOPY (EGD), REPLACE ESOPHAGEAL STENT N/A 8/25/2017    Procedure: COMBINED ESOPHAGOSCOPY, GASTROSCOPY, DUODENOSCOPY (EGD), REPLACE ESOPHAGEAL STENT;;  Surgeon: Jens Wise MD;  [facility]     COMBINED ESOPHAGOSCOPY, GASTROSCOPY, DUODENOSCOPY (EGD), REPLACE ESOPHAGEAL STENT N/A 9/15/2017    Procedure: COMBINED ESOPHAGOSCOPY, GASTROSCOPY, DUODENOSCOPY (EGD), REPLACE ESOPHAGEAL STENT;  Upper Endoscopy with Stent Exchange, Cauterization of Tracheosophageal Fistula,  Cauterization of Chest Wound   ;  Surgeon: Jens Wise MD;  [facility]     COMBINED ESOPHAGOSCOPY, GASTROSCOPY, DUODENOSCOPY (EGD), REPLACE ESOPHAGEAL STENT N/A 10/20/2017    Procedure: COMBINED ESOPHAGOSCOPY, GASTROSCOPY, DUODENOSCOPY (EGD), REPLACE ESOPHAGEAL STENT;  Upper Endoscopy with Stent Exchange, Cauterization Tracheosphageal Fistula Tract;  Surgeon: Nalini Barreto MD;  [facility]     COMBINED ESOPHAGOSCOPY, GASTROSCOPY, DUODENOSCOPY (EGD), REPLACE ESOPHAGEAL STENT N/A 11/3/2017    Procedure: COMBINED ESOPHAGOSCOPY, GASTROSCOPY, DUODENOSCOPY (EGD), REPLACE ESOPHAGEAL STENT;  Esophagogastroduodenoscopy, Stent Revision, Cauterization Of Traceoesophageal Fistula and stent exchange;  Surgeon: Nalini Barreto MD;  [facility]     COMBINED ESOPHAGOSCOPY, GASTROSCOPY, DUODENOSCOPY (EGD), REPLACE ESOPHAGEAL STENT N/A 11/17/2017    Procedure: COMBINED ESOPHAGOSCOPY, GASTROSCOPY, DUODENOSCOPY (EGD), REPLACE ESOPHAGEAL STENT;  Esophagogastroduodenoscopy, Esophogeal Stent Removal, Esophogeal Stent Placement (23x100 EndoMAXX), Cauterization Of Fistula Tract;  Surgeon: Nalini Barreto MD;  [facility]     CREATE SPIT FISTULA N/A 1/9/2017    Procedure: CREATE SPIT FISTULA;  Surgeon: Jens Wise MD;  [facility]     ESOPHAGECTOMY N/A 1/9/2017    Procedure: ESOPHAGECTOMY;  Surgeon: Jens Wise MD;  [facility]     ESOPHAGOSCOPY, GASTROSCOPY, DUODENOSCOPY (EGD), COMBINED N/A 4/18/2016    Procedure: COMBINED ESOPHAGOSCOPY, GASTROSCOPY, DUODENOSCOPY (EGD);  Surgeon: Alexis Barraza MD;  [facility]     ESOPHAGOSCOPY, GASTROSCOPY, DUODENOSCOPY (EGD), COMBINED N/A 4/25/2016    Procedure: COMBINED ESOPHAGOSCOPY, GASTROSCOPY, DUODENOSCOPY (EGD);  Surgeon: Alexis Barraza MD;  [facility]     ESOPHAGOSCOPY, GASTROSCOPY, DUODENOSCOPY (EGD), COMBINED N/A 5/4/2016    Procedure: COMBINED ESOPHAGOSCOPY, GASTROSCOPY, DUODENOSCOPY (EGD);   Surgeon: Alexis Barraza MD;  [facility]     ESOPHAGOSCOPY, GASTROSCOPY, DUODENOSCOPY (EGD), COMBINED N/A 5/18/2016    Procedure: COMBINED ESOPHAGOSCOPY, GASTROSCOPY, DUODENOSCOPY (EGD);  Surgeon: Alexis Barraza MD;  [facility]     ESOPHAGOSCOPY, GASTROSCOPY, DUODENOSCOPY (EGD), COMBINED N/A 6/22/2016    Procedure: COMBINED ESOPHAGOSCOPY, GASTROSCOPY, DUODENOSCOPY (EGD);  Surgeon: Alexis Barraza MD;  [facility]     ESOPHAGOSCOPY, GASTROSCOPY, DUODENOSCOPY (EGD), COMBINED N/A 7/12/2016    Procedure: COMBINED ESOPHAGOSCOPY, GASTROSCOPY, DUODENOSCOPY (EGD);  Surgeon: Jens Wise MD;  [facility]     ESOPHAGOSCOPY, GASTROSCOPY, DUODENOSCOPY (EGD), COMBINED N/A 4/21/2017    Procedure: COMBINED ESOPHAGOSCOPY, GASTROSCOPY, DUODENOSCOPY (EGD);  Esophagogastroduodenoscopy, Pharyngostomy Tube Placement ;  Surgeon: Jens Wise MD;  [facility]     ESOPHAGOSCOPY, GASTROSCOPY, DUODENOSCOPY (EGD), COMBINED N/A 5/19/2017    Procedure: COMBINED ESOPHAGOSCOPY, GASTROSCOPY, DUODENOSCOPY (EGD);  Upper Endoscopy, Irrigation and Debridement of Chest Wound ;  Surgeon: Nalini Barreto MD;  [facility]     ESOPHAGOSCOPY, GASTROSCOPY, DUODENOSCOPY (EGD), COMBINED N/A 5/23/2017    Procedure: COMBINED ESOPHAGOSCOPY, GASTROSCOPY, DUODENOSCOPY (EGD);;  Surgeon: Nalini Barreto MD;  [facility]     ESOPHAGOSCOPY, GASTROSCOPY, DUODENOSCOPY (EGD), COMBINED N/A 6/27/2017    Procedure: COMBINED ESOPHAGOSCOPY, GASTROSCOPY, DUODENOSCOPY (EGD);  Esophagogastroduodenoscopy With Removal And Replacement Of Esophageal Stent exchange. Exchange of pharyngtomy tube. Chest wound dressing change;  Surgeon: Barreto, Nalini V, MD;  [facility]     ESOPHAGOSCOPY, GASTROSCOPY, DUODENOSCOPY (EGD), COMBINED N/A 8/4/2017    Procedure: COMBINED ESOPHAGOSCOPY, GASTROSCOPY, DUODENOSCOPY (EGD);  Esophagogastroduodenoscopy, Stent Removal and Stent Replacement. Dressing Change ;   Surgeon: Nalini Barreto MD;  [facility]     ESOPHAGOSCOPY, GASTROSCOPY, DUODENOSCOPY (EGD), COMBINED N/A 8/25/2017    Procedure: COMBINED ESOPHAGOSCOPY, GASTROSCOPY, DUODENOSCOPY (EGD);  Esophagogastroduodenoscopy,  Stent Revision,  Cauterization;  Surgeon: Jens Wise MD;  [facility]     ESOPHAGOSCOPY, GASTROSCOPY, DUODENOSCOPY (EGD), COMBINED N/A 10/2/2017    Procedure: COMBINED ESOPHAGOSCOPY, GASTROSCOPY, DUODENOSCOPY (EGD);  Esophagogastroduodenostomy, Replacement of Esophageal Stent, Cauterization of Tracheosophageal Fistula anc chest wall,   C-arm;  Surgeon: Nalini Barreto MD;  [facility]     ESOPHAGOSCOPY, GASTROSCOPY, DUODENOSCOPY (EGD), DILATATION, COMBINED N/A 6/29/2016    Procedure: COMBINED ESOPHAGOSCOPY, GASTROSCOPY, DUODENOSCOPY (EGD), DILATATION;  Surgeon: Jens Wise MD;  [facility]     EXPLORE NECK N/A 5/19/2017    Procedure: EXPLORE NECK;;  Surgeon: Nalini Barreto MD;  [facility]     HC UGI ENDOSCOPY W TRANSENDOSCOPIC STENT PLACEMENT N/A 7/12/2016    Procedure: COMBINED ESOPHAGOSCOPY, GASTROSCOPY, DUODENOSCOPY (EGD), PLACE TRANSENDOSCOPIC ESOPHAGEAL STENT;  Surgeon: Jens Wise MD;  [facility]     HC UGI ENDOSCOPY W TRANSENDOSCOPIC STENT PLACEMENT N/A 7/22/2016    Procedure: COMBINED ESOPHAGOSCOPY, GASTROSCOPY, DUODENOSCOPY (EGD), PLACE TRANSENDOSCOPIC ESOPHAGEAL STENT;  Surgeon: Jens Wise MD;  [facility]     INCISION AND DRAINAGE CHEST WASHOUT, COMBINED N/A 5/27/2017    Procedure: COMBINED INCISION AND DRAINAGE CHEST WASHOUT;  Chest washout;  Surgeon: Nalini Barreto MD;  [facility]     INCISION AND DRAINAGE CHEST WASHOUT, COMBINED N/A 5/28/2017    Procedure: COMBINED INCISION AND DRAINAGE CHEST WASHOUT;  Chest washout;  Surgeon: Nalini Barreto MD;  [facility]     INCISION AND DRAINAGE CHEST WASHOUT, COMBINED N/A 6/9/2017    Procedure: COMBINED INCISION AND DRAINAGE CHEST WASHOUT;  Chest  washout and dressing change, Esophaogastroduodenoscopy;  Surgeon: Jens Wise MD;  [facility]     INCISION AND DRAINAGE CHEST WASHOUT, COMBINED N/A 7/17/2017    Procedure: COMBINED INCISION AND DRAINAGE CHEST WASHOUT;  Incision and Drainage of right Chest Wound, Esophagogastroduodenoscopy with stent exchange. pharangostomy tube revision;  Surgeon: Jens Wise MD;  [facility]     IRRIGATION AND DEBRIDEMENT CHEST WASHOUT, COMBINED N/A 6/29/2016    Procedure: COMBINED IRRIGATION AND DEBRIDEMENT CHEST WASHOUT;  Surgeon: Jens Wise MD;  [facility]     IRRIGATION AND DEBRIDEMENT CHEST WASHOUT, COMBINED N/A 5/23/2017    Procedure: COMBINED IRRIGATION AND DEBRIDEMENT CHEST WASHOUT;  COMBINED IRRIGATION AND DEBRIDEMENT CHEST WASHOUT,COMBINED ESOPHAGOSCOPY, GASTROSCOPY, DUODENOSCOPY (EGD) ;  Surgeon: Nalini Barreto MD;  [facility]     IRRIGATION AND DEBRIDEMENT CHEST WASHOUT, COMBINED N/A 5/24/2017    Procedure: COMBINED IRRIGATION AND DEBRIDEMENT CHEST WASHOUT;  Chest wound Washout and Dressing Change;  Surgeon: Nalini Barreto MD;  [facility]     IRRIGATION AND DEBRIDEMENT CHEST WASHOUT, COMBINED N/A 5/25/2017    Procedure: COMBINED IRRIGATION AND DEBRIDEMENT CHEST WASHOUT;  Irrigation and Debridement Chest Washout and dressing change;  Surgeon: Nalini Barreto MD;  [facility]     IRRIGATION AND DEBRIDEMENT CHEST WASHOUT, COMBINED N/A 5/26/2017    Procedure: COMBINED IRRIGATION AND DEBRIDEMENT CHEST WASHOUT;  Irrigation and Debridement Chest Washout with  dressing change;  Surgeon: Nalini Barreto MD;  [facility]     IRRIGATION AND DEBRIDEMENT CHEST WASHOUT, COMBINED N/A 5/30/2017    Procedure: COMBINED IRRIGATION AND DEBRIDEMENT CHEST WASHOUT;  Irrigation and Debridement Chest Washout , PICC line dressing change;  Surgeon: Nalini Barreto MD;  [facility]     IRRIGATION AND DEBRIDEMENT CHEST WASHOUT, COMBINED N/A 5/31/2017    Procedure:  COMBINED IRRIGATION AND DEBRIDEMENT CHEST WASHOUT;  Irrigation and Debridement Chest Washout ;  Surgeon: Nalini Barreto MD;  [facility]     IRRIGATION AND DEBRIDEMENT CHEST WASHOUT, COMBINED N/A 6/1/2017    Procedure: COMBINED IRRIGATION AND DEBRIDEMENT CHEST WASHOUT;  Chest Wound Irrigation And Debridement with dressing change ;  Surgeon: Nalini Barreto MD;  [facility]     IRRIGATION AND DEBRIDEMENT CHEST WASHOUT, COMBINED N/A 6/4/2017    Procedure: COMBINED IRRIGATION AND DEBRIDEMENT CHEST WASHOUT;  COMBINED IRRIGATION AND DEBRIDEMENT CHEST WASHOUT, Esophagoscopy, Gastroscopy, Duodenoscopy (EGD) place transendoscopic esophageal stent,   Pharyngostomy and chest wall tube exchange  C-Arm;  Surgeon: Jens Wise MD;  [facility]     IRRIGATION AND DEBRIDEMENT CHEST WASHOUT, COMBINED N/A 6/2/2017    Procedure: COMBINED IRRIGATION AND DEBRIDEMENT CHEST WASHOUT;  Chest Wound Irrigation and Debridement, Dressing Change;  Surgeon: Nalini Barreto MD;  [facility]     LAPAROSCOPIC ASSISTED INSERTION TUBE JEJUNOSTOMY N/A 4/17/2017    Procedure: LAPAROSCOPIC ASSISTED INSERTION TUBE JEJUNOSTOMY;;  Surgeon: Jens Wise MD;  [facility]     LAPAROSCOPY DIAGNOSTIC (GENERAL) N/A 1/9/2017    Procedure: LAPAROSCOPY DIAGNOSTIC (GENERAL);  Surgeon: Jens Wise MD;  [facility]     LAPAROSCOPY DIAGNOSTIC (GENERAL) N/A 4/17/2017    Procedure: LAPAROSCOPY DIAGNOSTIC (GENERAL);  Laparoscopic , Neck Dissection, Spit Fistula Takedown, Laparoscopic Jejunostomy Tube and Pharyngostomy Tube, Gastric Pull up, Upper Endoscopy(EGD)  , Flexible Bronchoscopy ,Sternotomy ;  Surgeon: Jens Wise MD;  [facility]     LUMPECTOMY BREAST Right 2007     NERVE BLOCK PERIPHERAL N/A 8/30/2016    Procedure: NERVE BLOCK PERIPHERAL;  Surgeon: GENERIC ANESTHESIA PROVIDER;  [facility]     NISSEN FUNDOPLICATION  1/2016     PHARYNGOSTOMY N/A 4/18/2016    Procedure:  "PHARYNGOSTOMY;  Surgeon: Alexis Barraza MD;  [facility]     PHARYNGOSTOMY N/A 2016    Procedure: PHARYNGOSTOMY;  Surgeon: Alexis Barraza MD;  [facility]     PHARYNGOSTOMY N/A 2016    Procedure: PHARYNGOSTOMY;  Surgeon: Alexis Barraza MD;  [facility]     PHARYNGOSTOMY N/A 2016    Procedure: PHARYNGOSTOMY;  Surgeon: Alexis Barraza MD;  [facility]     PHARYNGOSTOMY N/A 2016    Procedure: PHARYNGOSTOMY;  Surgeon: Jens Wise MD;  [facility]     PHARYNGOSTOMY N/A 2017    Procedure: PHARYNGOSTOMY;;  Surgeon: Jens Wise MD;  [facility]     PHARYNGOSTOMY Left 2017    Procedure: PHARYNGOSTOMY;;  Surgeon: Nalini Barreto MD;  [facility]     PICC INSERTION Left 2016    5fr DL BioFlo PICC, 42cm (3cm external) in the L medial brachial vein w/ tip in the SVC RA junction.     PICC INSERTION - Rewire Right 2017    5fr DL BioFlo PICC, 40cm (6cm external) in the R medial brachial vein w/ tip in the SVC RA junction.     REPAIR FISTULA TRACHEOESOPHAGEAL N/A 9/15/2017    Procedure: REPAIR FISTULA TRACHEOESOPHAGEAL;;  Surgeon: Jens Wise MD;  [facility]     THORACOTOMY Right     lung infection - \"hard crust formed on lung\"     THORACOTOMY Left 2017    Procedure: THORACOTOMY;  Surgeon: Jens Wise MD;  [facility]     WRIST SURGERY                Social History:     Social History     Tobacco Use     Smoking status: Former Smoker     Packs/day: 1.00     Years: 15.00     Pack years: 15.00     Types: Cigarettes     Last attempt to quit: 1998     Years since quittin.7     Smokeless tobacco: Never Used   Substance Use Topics     Alcohol use: No     Alcohol/week: 0.0 standard drinks             Family History:   The family history includes Alzheimer Disease in her mother; Breast Cancer in her daughter; Cerebrovascular Disease in her father; " Ovarian Cancer in her sister.             Immunizations:     Immunization History   Administered Date(s) Administered     Influenza (High Dose) 3 valent vaccine 11/22/2016     Mantoux Tuberculin Skin Test 06/30/2017            Allergies:     Allergies   Allergen Reactions     Amoxicillin Diarrhea     Ativan [Lorazepam] Other (See Comments)     Hallucinations     Morphine Itching             Medications:   @  Medications Prior to Admission   Medication Sig Dispense Refill Last Dose     carboxymethylcellulose PF (REFRESH PLUS) 0.5 % ophthalmic solution Place 1 drop into both eyes 4 times daily as needed for dry eyes   9/18/2020     folic acid (FOLVITE) 1 MG tablet Take 1 mg by mouth daily   9/21/2020 at am     furosemide (LASIX) 40 MG tablet Take 0.5 tablets (20 mg) by mouth daily as needed 30 tablet 0 9/21/2020 at am     ketotifen (ZADITOR) 0.025 % ophthalmic solution Place 1 drop into both eyes daily as needed for itching   9/18/2020     lactobacillus rhamnosus, GG, (CULTURELL) capsule Take 1 capsule by mouth 2 times daily   9/21/2020 at am     loperamide (IMODIUM A-D) 2 MG tablet Take 2 mg by mouth 4 times daily as needed    9/21/2020 at 1100     melatonin 3 MG tablet Take 1 tablet (3 mg) by mouth nightly as needed for sleep 60 tablet 1 9/20/2020 at pm     metoprolol succinate ER (TOPROL-XL) 25 MG 24 hr tablet Take 12.5 mg by mouth every morning   9/21/2020 at am     omeprazole (PRILOSEC) 20 MG DR capsule Take 1 capsule by mouth 3 times daily before meals and at bedtime as needed.   9/21/2020 at 1100     potassium chloride (KLOR-CON) 20 MEQ packet Take 20 mEq by mouth 2 times daily   9/21/2020 at am     potassium phosphate, monobasic, (K-PHOS) 500 MG tablet Take 250 mg by mouth daily   9/21/2020 at am     thiamine (B-1) 100 MG tablet Take 100 mg by mouth daily   9/21/2020 at am     tiZANidine (ZANAFLEX) 4 MG tablet Take 4 mg by mouth At Bedtime   9/20/2020 at pm     venlafaxine (EFFEXOR-XR) 37.5 MG 24 hr capsule  Take 37.5 mg by mouth daily   9/21/2020 at am     albuterol (PROAIR HFA/PROVENTIL HFA/VENTOLIN HFA) 108 (90 Base) MCG/ACT inhaler Inhale 1-2 puffs into the lungs every 4 hours as needed for shortness of breath / dyspnea or wheezing   More than a month at Unknown time     gabapentin (NEURONTIN) 100 MG capsule Take 100 mg by mouth At Bedtime    9/20/2020 at pm     OTHER MEDICAL SUPPLIES every morning Daily weights.        OTHER MEDICAL SUPPLIES 4 times daily BP checks qid.        OTHER MEDICAL SUPPLIES Legs: Black socks and Compriflex LEs. Thigh high wraps. Leave on at all times. If soiled may remove thigh portions.    every shift        vitamin D2 (ERGOCALCIFEROL) 61921 units (1250 mcg) capsule Take 50,000 Units by mouth once a week   9/20/2020 at am   @          Review of Systems:    ROS: 10 point ROS neg other than the symptoms noted above in the HPI.          Physical Exam:   Blood pressure 127/46, pulse 80, temperature 97.9  F (36.6  C), temperature source Oral, resp. rate 16, height 1.524 m (5'), weight 51.1 kg (112 lb 10.5 oz), SpO2 98 %, not currently breastfeeding. Body mass index is 22 kg/m .  Date 09/24/20 0700 - 09/25/20 0659   Shift 4566-9690 2872-9608 7381-6152 24 Hour Total   INTAKE   P.O. 120   120   Shift Total(mL/kg) 120(2.35)   120(2.35)   OUTPUT   Urine 200   200   Shift Total(mL/kg) 200(3.91)   200(3.91)   Weight (kg) 51.1 51.1 51.1 51.1     General: In no acute distress, mild facial muscle wasting  Neuro: AOx3, No asterixis  HEENT: PERRL Noscleral icterus, Nooral lesions  Lymph:  Nocervical lymphadenoapthy  CV: S1/S2 with gr 2/6 murmurs, Skin warm and dry  Lungs:  Respirations even and nonlabored on room air  Abd: Mildly distended, nontender  Extrem: +1 lower peripehral edema  Skin: Nojaundice  Psych: pleasant         Data:     Lab Results   Component Value Date    WBC 6.1 09/24/2020     Lab Results   Component Value Date    RBC 2.37 09/24/2020     Lab Results   Component Value Date    HGB 9.1  09/24/2020     Lab Results   Component Value Date    HCT 29.0 09/24/2020     No components found for: MCT  Lab Results   Component Value Date     09/24/2020     Lab Results   Component Value Date    MCH 38.4 09/24/2020     Lab Results   Component Value Date    MCHC 31.4 09/24/2020     Lab Results   Component Value Date    RDW 14.6 09/24/2020     Lab Results   Component Value Date     09/24/2020       Last Basic Metabolic Panel:  Lab Results   Component Value Date     09/24/2020      Lab Results   Component Value Date    POTASSIUM 3.2 09/24/2020     Lab Results   Component Value Date    CHLORIDE 110 09/24/2020     Lab Results   Component Value Date    ANNABELLE 7.5 09/24/2020     Lab Results   Component Value Date    CO2 24 09/24/2020     Lab Results   Component Value Date    BUN 3 09/24/2020     Lab Results   Component Value Date    CR 0.55 09/24/2020     Lab Results   Component Value Date    GLC 76 09/24/2020       Liver Function Studies -   Recent Labs   Lab Test 09/24/20  0534   PROTTOTAL 4.7*   ALBUMIN 1.9*   BILITOTAL 1.8*   ALKPHOS 164*   AST 32   ALT 19       Lab Results   Component Value Date    INR 1.60 09/22/2020       MELD-Na score: 14 at 9/25/2020  6:11 AM  MELD score: 14 at 9/25/2020  6:11 AM  Calculated from:  Serum Creatinine: 0.60 mg/dL (Rounded to 1 mg/dL) at 9/25/2020  6:11 AM  Serum Sodium: 144 mmol/L (Rounded to 137 mmol/L) at 9/25/2020  6:11 AM  Total Bilirubin: 1.5 mg/dL at 9/25/2020  6:11 AM  INR(ratio): 1.67 at 9/25/2020  6:11 AM  Age: 75 years 7 months           Previous Endoscopy:   No results found for this or any previous visit.      IMAGING:  MRCP 9/22/20  IMPRESSION:  1. Dilated common bile duct measuring up to 12 mm with intrahepatic  biliary ductal dilatation. There is no obstructing calculus or mass  lesion noted. Furthermore, this medication has been stable since at  least 2016. No MRI evidence for cholangitis, which is typically a  clinical diagnosis. This finding  being stable suggests benignity. The  etiology could be a benign and chronic stricturing at the level of  ampulla versus mass effect from duodenal diverticulum.  2. Hepatic steatosis with findings suggesting cirrhosis.  3. Gallbladder sludge/small stones.  4. Volume overload with small volume ascites, moderate anasarca, and  small bilateral pleural effusions.    CT chest/abdomen w 8/18/20  IMPRESSION:  1.  No evidence of metastatic disease in the chest, abdomen or pelvis.  2.  Multiple nodular opacities with a branching pattern are noted in  both lungs are likely due to infection or aspiration. Recommend follow  up to confirm resolution.  3.  Mildly dilated common bile duct without CT evidence for  choledocholithiasis or mass. The dilatation could be due to mass  effect form periampullary duodenal diverticulum.  4.  Diffuse hepatic steatosis.     US abd 8/17/20  IMPRESSION:   1.  Dilated common bile duct measuring 13 mm. No significant change in  diameter since 8/3/2020, somewhat improved since CT abdomen pelvis  dated 11/15/2017, when it measured up to 16 mm. If there is  significant and persistent clinical concern for choledocholithiasis or  other obstructing pathology at the level of distal bile duct, MRCP  could be obtained.  2.  No direct sonographic evidence of cholelithiasis or  choledocholithiasis. Small amount of biliary sludge.  3.  Hepatic steatosis.

## 2020-09-24 NOTE — PLAN OF CARE
"OT/Edema 5B  Pt with shiny BLEs with soft 2+ pitting in distal LEs and firm 2+ pitting in feet bilaterally. Comperm dressing noted on LLE which pt reports is due to skin tear and bleeding. Observable difference in size between RLE where GCBs were donned vs LLE that was left alone due to need to monitor BP. Continued with plan to alternate compression on LEs daily to manage edema, protect skin integrity, and promote tolerance for functional mobility. GCBs donned from MTPs to knee crease in LLE using \"quick wrap\" technique. Pt reporting comfort. Educated on need to remove if experiencing pain or numbness or wraps become soiled.  "

## 2020-09-25 NOTE — PROVIDER NOTIFICATION
Patient has been assessed for Home Oxygen needs. Oxygen readings:    *Pulse oximetry (SpO2) = 99% on room air at rest while awake.    *SpO2 improved to n/a % on n/a liters/minute at rest.    *SpO2 = 80% on room air during activity/with exercise.    *SpO2 improved to 94% on 2L liters/minute during activity/with exercise.

## 2020-09-25 NOTE — PROGRESS NOTES
"   09/25/20 0800   Quick Adds   Type of Visit Initial Occupational Therapy Evaluation   Living Environment   Lives With alone   Living Arrangements independent living facility   Home Accessibility no concerns   Living Environment Comment Pt lives alone in IND living facility, elevator access   Self-Care   Usual Activity Tolerance moderate   Current Activity Tolerance fair   Regular Exercise Yes   Activity/Exercise Type other (see comments)  (TCU rehab )   Equipment Currently Used at Home shower chair;walker, rolling;grab bar, tub/shower;grab bar, toilet;cane, straight   Activity/Exercise/Self-Care Comment Pt in/out of hospital and TCU. Normally IND w/ADLs and mod-I for mobility    Functional Level   Ambulation 1-->assistive equipment   Transferring 1-->assistive equipment   Toileting 1-->assistive equipment   Bathing 1-->assistive equipment   Dressing 0-->independent   Eating 0-->independent   Communication 0-->understands/communicates without difficulty   Swallowing 0-->swallows foods/liquids without difficulty   Cognition 0 - no cognition issues reported   Fall history within last six months yes   Number of times patient has fallen within last six months 3   Which of the above functional risks had a recent onset or change? ambulation;dressing   Prior Functional Level Comment Katey with 4WW at baseline   General Information   Onset of Illness/Injury or Date of Surgery - Date 09/21/20   Referring Physician Caterina Garcia, REJI    Patient/Family Goals Statement \"To go home\"   Additional Occupational Profile Info/Pertinent History of Current Problem \"75 year old female with PMH failure to thrive, malnutrition, EtOH use, CAD s/p stent in 0218, HFpEF, MS, breast cancer s/p lumpectomy with lymph node, meniere's disease, paroxysmal afib, GERD s/p multiple esophageal surgeries who presented from TCU and complained of dyspnea now transferred to ICU with borderline BPs.  \"   Precautions/Limitations fall precautions "   General Info Comments Activity: up with assist    Cognitive Status Examination   Orientation orientation to person, place and time   Cognitive Comment Pt reported difficulty finding words to say 2/2 weakness    Visual Perception   Visual Perception Wears glasses   Visual Perception Comments Pt reported increased blurriness, hx of cataracts    Sensory Examination   Sensory Comments Skin intact to light touch, reported mild tenderness    Pain Assessment   Patient Currently in Pain No   Range of Motion (ROM)   ROM Comment WFL   Strength   Strength Comments WFL   Hand Strength   Hand Strength Comments  strength 2/5    Mobility   Bed Mobility Comments dep for boost    Balance   Balance Comments uses 4ww at baseline, reported progressing to straight cane when walking w/person    Grooming   Level of [place]: Grooming stand-by assist   Physical Assist/Nonphysical Assist: Grooming set-up required;supervision   Instrumental Activities of Daily Living (IADL)   Previous Responsibilities meal prep;housekeeping;medication management;finances   IADL Comments Pt reported delivery for groceries    Activities of Daily Living Analysis   Impairments Contributing to Impaired Activities of Daily Living strength decreased  (general deconditioning)   General Therapy Interventions   Planned Therapy Interventions ADL retraining;IADL retraining;strengthening;progressive activity/exercise   Clinical Impression   Criteria for Skilled Therapeutic Interventions Met yes, treatment indicated   OT Diagnosis decreased IND in ADLs/IADLs   Influenced by the following impairments decreased strength, general deconditioning   Assessment of Occupational Performance 3-5 Performance Deficits   Identified Performance Deficits functional mobility, home mgmt    Clinical Decision Making (Complexity) Low complexity   Therapy Frequency Daily   Predicted Duration of Therapy Intervention (days/wks) 2-3 days   Anticipated Equipment Needs at Discharge  "  (TBD)   Anticipated Discharge Disposition Transitional Care Facility   Risks and Benefits of Treatment have been explained. Yes   Patient, Family & other staff in agreement with plan of care Yes   Clinical Impression Comments Pt to benefit from skilled OT to address above deficits to maximize IND in ADLs/IADLs    [facility] TM \"6 Clicks\"   2016, Trustees of [facility], under license to [employer].  All rights reserved.   6 Clicks Short Forms Daily Activity Inpatient Short Form   [facility]  \"6 Clicks\" Daily Activity Inpatient Short Form   1. Putting on and taking off regular lower body clothing? 2 - A Lot   2. Bathing (including washing, rinsing, drying)? 2 - A Lot   3. Toileting, which includes using toilet, bedpan or urinal? 2 - A Lot   4. Putting on and taking off regular upper body clothing? 3 - A Little   5. Taking care of personal grooming such as brushing teeth? 3 - A Little   6. Eating meals? 4 - None   Daily Activity Raw Score (Score out of 24.Lower scores equate to lower levels of function) 16   Total Evaluation Time   Total Evaluation Time (Minutes) 5     "

## 2020-09-25 NOTE — PLAN OF CARE
D/I  Uneventful shift.  Patient remains alert and oriented x 4, soft BP at times but remains stable.  Patient requesting that BP's be taken on her left leg.   Bilateral LE edema with right leg > left leg. Left LE with lymphedema wrap.  Up with assist of 1 and walker.   Appetite poor, Unable to even eat a couple of bites.  Potassium  (Klor-con) refused x 2 cause she can't handle the fluids that we put the powder in and the pills are too big.  Patient remains calm and cooperative with cares. No BP and no Labs on right arm.  Purewick in place.  Will continue to monitor per plan of care and will notify MD with any acute changes.

## 2020-09-25 NOTE — PROGRESS NOTES
Calorie Count    Intake recorded for: 9/24/2020  Total Kcals: 94 Total Protein: 7g    Kcals from Hospital Food: 94   Protein: 7g    Kcals from Outside Food (average):0 Protein: 0g    # Meals Recorded: 3 meals ordered, 1 recorded (25% breakfast sandwich on english muffin w/ scrambled egg, cheddar cheese & ham)    # Supplements Recorded: no intake recorded

## 2020-09-25 NOTE — PROGRESS NOTES
"Social Work Services Discharge Note      Patient Name:  Minerva Blanco     Anticipated Discharge Date:  9/25/20    Discharge Disposition:   TCU:  [address] [place] [place] [address]  P: [phone]  F: [phone]    Following MD:  Lula Roberts PA-C     Pre-Admission Screening (PAS) online form has been completed.  The Level of Care (LOC) is:  Determined  Confirmation Code is:  N/A  Patient/caregiver informed of referral to [facility] for Pre-Admission Screening for skilled nursing facility (SNF) placement and to expect a phone call post discharge from SNF.     Additional Services/Equipment Arranged:  [facility] to provide w/c transport at 4:15pm today.     Patient / Family response to discharge plan:  Pt \"would love\" to return to previous facility. Pt stated she will update her family.     Persons notified of above discharge plan:  Pt, facility, medical team.    Staff Discharge Instructions:  Please fax discharge orders and signed hard scripts for any controlled substances.  Please print a packet and send with patient.     CTS Handoff completed:  YES    Medicare Notice of Rights provided to the patient/family:  YES    Yesterday, pt stated she \"would love\" to return to her previous setting after SW explained that therapies are recommending TCU's. Pt had forfeited her bed hold d/t costs, but facility had bed available for today and pt was medically ready for discharge per medical team.    Saul Gonzalez, MSW, SW  Acute Care Float   [facility]          "

## 2020-09-25 NOTE — PLAN OF CARE
Assumed cares: [id number]  Status: Admitted for edema    VS: VSS on RA  Neuros: A&O x4  Cardiac: WDL  Respiratory: WDL, no SOB reported  GI/: Voiding spontaneously, BM x1  Nutrition: High nell/high protein diet  IV/Drains: PIV saline locked  Activity: Up with assist of 1 to commode  Pain: Denies  Skin: Skin dry/flaky. Mepilex in place to mid back    Plan of Care: Patient dropped sats to 80% with ambulation. Potential discharge this weekend to TCU pending oxygenation. Patient able to make needs known. Continue to monitor and update MD with changes.

## 2020-09-25 NOTE — PROGRESS NOTES
{ TIP  Improve documentation with new tip texts.  DO NOT DELETE TIPS. Once your note is signed tips will disappear. Learn more- Malnutrition tip sheet, Sepsis tip sheet, & QuickLearn videos                                    :[id number]}    [facility] Progress Note - Hospitalist Service, Gold 5       Date of Admission:  9/21/2020  Assessment & Plan          74 yo F with PMHx of CAD s/p ADOLFO 2018, chronic HFpEF, pAfib not on anticoagulation, MS, breast cancer s/p lumpectomy and lymph node resection, GERD s/p multiple esophageal surgeries with multiple recent hospitalization due to weakness, falls, malnutrition and failure to thrive, who presents with worsening OSORIO for the last week from TCU, admitted on 9/21 for acute hypoxic respiratory failure, concerning for resolving aspiration pneumonia vs pulmonary edema due to anasarca, which is likely multifactorial from malnutrition, HFpEF, and advanced liver disease.      #Dypsnea on exertion, improved   #Acute hypoxic respiratory failure  #Possible aspiration pneumonia, resolved   #Pulmonary edema, improving   Patient endorses worsening shortness of breath on exertion over the last week. Also endorses weight gain, LE edema, and orthopnea. CXR prior to admission at TCU with LLL consolidation. Patient was started on flagyl and ceftin on 9/17 and developed oxygen requirement. On this admission, patient is currently 99% on RA without any tachypnea. CXR clear. COVID 19 negative. Procal 0.18. Patient completed 7 days of antibiotics total (ceftin/flagyl --> unasyn --> zosyn). Urine antigen strep pneumoniae and legionella negative.  TTE with moderate-severe tricuspid regurgitation, with pulmonary hypertension with PA pressure at least 37 mmHg.   DDx includes pulmonary hypertension vs valvular disease, vs hypervolemia due to anasarca and less likely aspiration pneumonia d/t esophageal strictures and alcohol use. SLP recommended regular  diet with GERD precautions.  - O2 saturations 99% on RA at rest, but Ambulatory sats dropped to 80% on RA likely continues to have pulmonary edema. Diuresis as noted below.   - Will obtain D-dimer for ?PE.  - Had a recent CXR 2 days ago, will hold repeat for now.   - Will obtain CT chest if not improving with diuresis.   - IS      #Hypotension, resolved   Develops hypotension to SBP 60 on 9/22 after receiving IV diuretics.  Asymptomatic. DDx Hypovolemic vs septic. Improved with IVF and albumin, and did not require pressors. No source found for infectious work up.  Low suspicion for pulmonary source as noted above. Small ascites, but low suspicion for SBP. Patient was hypothermic and leukopenic, which has since resolved. Lactic flat. Procal 0.10. Blood cultures NGTD. Urine culture no growth.  AM cortisol 13.4. Patient is likely intravascularly depleted and third spacing causing low blood pressures.   -Midodrine 5 mg TID  -Follow up blood cultures, and urine culture, NGTD  -discontinue metoprolol     #Anasarca  #HFpEF exacerbation   Etiology unclear, but most likely due to underlying cirrhosis vs malnutrition vs HFpEF. UA not consistent with nephrotic syndrome. Albumin low at 1.1. Imaging with small amount of ascites, pleural effusions and significant extremity edema which is weeping. TTE with normal EF with tricuspid regurgitation and pulmonary hypertension. NT-proBNP elevated 2039. Bibasilar crackles on exam.   Attempted to diurese with IV lasix 40 mg BID, but developed hypotension as noted above. After fluid resuscitation and discontinuing metoprolol and starting midodrine, patient's BP has been stable and tolerated Lasix IV 20 mg daily.   -Lasix 20 mg IV BID, and assess urine output   -Daily weights  -Strict I&O  -Low salt diet   -PT consult for lymphedema      #Transaminitis  #Coagulopathy   #Hepatitc steatosis, with probable early cirrhosis   Chronically elevated Alk phos since 2017. Total bilirubin and AST/ALT  mildly elevated since 8/2020. INR elevated at 1.62. Patient states she drinks daily, which per family is under reported. MRCP with hepatic steatosis with cirrhotic morphology, also showing chronic CBD dilation to 12mm without any obstruction or mass unchanged from 2016. High suspicion for underlying cirrhosis. Small amount of ascites on imaging, denies any abd pain, fevers, or leukocytosis to suggest SBP. No signs of HE on exam. Platelet are interestingly normal.  Hepatology consulted, and felt that patient has advanced liver disease with steatosis, with possible early cirrhosis, and recommended alcohol cessation and improved nutrition.   -Hepatology consulted, appreciate recommendations  -Hold lactulose at this time with no HE  -Trend CMP and INR   -Diurese as noted above   -Follow up with GI, patient preferred follow up with her provider at [facility].      #Moderate alcohol use disorder   Reports drinking 1-2 drinks a day, but per family patient this is very under reported. No withdrawal symptoms during hospitalization.  Discussed sobriety, and patient agrees and wants to quit drinking, though also does not feel she has a problem with drinking. Offered CD treatment referral, but patient declined. -Continue Folic acid, thiamine, and MVI.      #Macrocytic anemia  MCV quite elevated at 122. Hemoglobin baseline 10.0s. Dropped to 8.3 this morning. Recent work up in August with Vitamin B12 1679. Folate 7.6. TSH at 1.81. No bone marrow biopsy. SPEP at [facility] [place] [place] with moderate hypoalbuminemia with differential including liver disease, malnutrition, renal dx, GI loss, or inflammation. Unable to find UPEP on care-everywhere.  -Trend CBC  -If persists, recommended follow up with hematology in outpatient.      #Severe protein calore malnutrition   #Malnutrition:    Based on: reduced intake, moderate/severe fluid retention. Albumin 1.1. -Nutrition consulted  -Calorie counts.   -Continue supplements and high protein/calorie  diet.      #Electrolyte derangements   #Hypokalemia  #Hypophosphatemia  #Hypocalcemia   Low phosphorus, Magnesium, and calcium.  -Replete with KCl 40 mEq TID  -Replete Mg and phos per nursing SS  -Trend Mg, phos, and K    #Failure to thrive   Progressive weakness, with multiple falls and now unable to care for self over the last month. Infectious work up as noted above. Patient recently had CT chest/abd/pelvis to evaluation for possible underlying malignancy which was negative for any masses. Likely due to underlying co-morbidities and malnutrition, and alcohol use.   -PT/OT recommending TCU.      #GERD  #Hx of esophageal stricture  #Dysphagia   S/p nissen fundoplication 2016 due to hiatal hernia. Endorses chronic regitation. Denies any dysphagia or odynophagia.   - Prilosec 20 mg BID.   - Carafate 1g QID      #IBS  #Diarrhea   Chronic diarrhea, worked up by GI in the past. Worse in setting of antibiotics. Recent C. Diff negaitve. TTG negative for celiac. Fecal fat normal.   -Continue PTA loperamide 2 mg QID PRN and probiotic.       #CAD  S/p ADOLFO x2 in 2018. Denies any chest pain. Troponin negative. Not on any statin.   -Continue ASA 81 mg daily. Metoprolol held for hypotension.      #Paroxysmal Atrial fibrillation   Not on any anticoagulation. Discontinued metoprolol with hypotension.      #Hx of MS  Hx of optic neuritis in her 30s with no hx of MS symptoms in decades. Recently evaluated by neurology [facility] and felt to be stable without any exacerbation, and recommended outpatient neurology follow up in 2 weeks with EMG and neuropathy work up.  Patient is not interested in getting EMG while inpatient.     #Hx of breast cancer s/p lumpectomy   S/p lumpectomy and lymph node resection. R>L UE edema chronically. RUE ultrasound without any DVT.        Diet: High Kcal/High Protein Diet, ADULT  Snacks/Supplements Adult: Other; On 9/22, please send trial of Ensure Plant Based (2pm) and sugar free Gelatein (HS). Pt may order  PRN; Between Meals  Advance Diet as Tolerated    DVT Prophylaxis: Enoxaparin (Lovenox) SQ  Whitt Catheter: not present  Code Status: No CPR- Do NOT Intubate           Disposition Plan   Expected discharge: 2 - 3 days, recommended to transitional care unit once once optimized and diuresed and not longer hypoxic with ambulation. .  Entered: Lula Roberts PA-C 09/25/2020, 3:33 PM       The patient's care was discussed with the Attending Physician, Dr. Dennys George , Bedside Nurse, Patient and Patient's Family.    Lula Roberts PA-C  Hospitalist Service, [address], [place]  Pager: [phone]  Please see sticky note for cross cover information  ______________________________________________________________________    Interval History   State her breathing is about the same, possibly improved from yesterday. Denies any F/C, cough, N/V, abdominal pain. Had one episode of diarrhea this morning. Feels her edema has improve.     Was planning for discharge to TCU today, but desaturated to 87% with OT with ADL. Re-check ambulatory saturations, which dropped to 80% on RA with ambulation. Canceled discharge     Data reviewed today: I reviewed all medications, new labs and imaging results over the last 24 hours.     Physical Exam   Vital Signs: Temp: 97.6  F (36.4  C) Temp src: Oral BP: 128/78 Pulse: 82   Resp: 18 SpO2: (!) 87 %(long sitting completing ADLs w/OT ) O2 Device: None (Room air)    Weight: 113 lbs 1.54 oz     GENERAL: Alert and oriented x 3. NAD. Elderly appearing female. Pleasant and conversational.   HEENT: Anicteric sclera. EOMI. Mucous membranes moist   NECK: Trachea midline.   CARDIOVASCULAR: RRR. S1, S2. No murmurs, rubs, or gallops.   RESPIRATORY: Effort normal on RA. Bibasilar rales, with remaining lung fields clear to auscultation bilaterally.  respirations unlabored   GI: Abdomen soft, non-tender abdomen without rebound or guarding, normoactive bowel sounds  present   EXTREMITIES: +2 pitting edema in UE and LE bilaterally.  No calf asymmetry, erythema, or tenderness.   NEUROLOGICAL: No focal deficits. CN II-XII intact and symmetric. Strength in UE and LE 4+/5. DTR intact. Moving all extremities symmetrically.   SKIN: Intact. Warm and dry. No rashes or lesions.  No jaundice.     Data   Recent Labs   Lab 09/25/20  0611 09/24/20  0534 09/23/20  0518  09/22/20  0712  09/21/20  1408   WBC 5.0 6.1 3.8*   < > 4.0  --  7.1   HGB 9.1* 9.1* 7.0*   < > 8.3*  --  10.9*   * 122* 125*   < > 122*  --  123*    285 198   < > 242  --  335   INR 1.67*  --   --   --  1.60*  --   --     142 141  --  142  --  137   POTASSIUM 3.4 3.2* 3.3*  --  4.0  --  4.6   CHLORIDE 112* 110* 108  --  108  --  107   CO2 24 24 25  --  26  --  26   BUN 3* 3* 4*  --  4*  --  5*   CR 0.60 0.55 0.56  --  0.64   < > 0.47*   ANIONGAP 8 7 8  --  7  --  4   ANNABELLE 7.8* 7.5* 7.2*  --  7.4*  --  7.7*   GLC 60* 76 101*  --  71  --  85   ALBUMIN 1.9* 1.9* 2.6*  --  1.1*  --  1.5*   PROTTOTAL 4.6* 4.7* 4.9*  --  4.0*  --  5.2*   BILITOTAL 1.5* 1.8* 1.5*  --  1.5*  --  2.1*   ALKPHOS 162* 164* 135  --  191*  --  244*   ALT 17 19 18  --  19  --  29   AST 36 32 25  --  37  --  Canceled, Test credited   TROPI  --   --   --   --   --   --  <0.015    < > = values in this interval not displayed.     No results found for this or any previous visit (from the past 24 hour(s)).  Medications       enoxaparin ANTICOAGULANT  40 mg Subcutaneous Q24H     folic acid  1 mg Oral Daily     furosemide  20 mg Intravenous Once     gabapentin  100 mg Oral At Bedtime     gadobutrol  7.5 mL Intravenous Once     influenza vac high-dose quad  0.7 mL Intramuscular Prior to discharge     lactobacillus rhamnosus (GG)  1 capsule Oral BID     midodrine  5 mg Oral TID w/meals     multivitamin, therapeutic  1 tablet Oral Daily     omeprazole  20 mg Oral BID AC     potassium chloride  40 mEq Oral TID     sodium chloride (PF)  3 mL  Intracatheter Q8H     sucralfate  1 g Oral 4x Daily AC & HS     vitamin B1  100 mg Oral Daily     venlafaxine  37.5 mg Oral Daily     [START ON 9/27/2020] vitamin D2  50,000 Units Oral Weekly

## 2020-09-25 NOTE — PROGRESS NOTES
"Social Work Services Progress Note    Hospital Day: 5  Collaborated with:  Pt, Lula Roberts PA-C, Nisha (Admissions with [facility] TCU)    Data:  Per medicine rounds, pt medically ready for discharge today.    Intervention:  SW met with pt at bedside to discuss discharge planning. SHAHRAM explained that, per Nisha gamboa [facility] [phone], facility can accept pt today.     Lula Roberts PA-C later called SW to inform that pt's O2 level dropped when pt ambulated, and is no longer medically ready for discharge. PA informed that pt must remain in hospital until at least Monday, 9/28/20.     Pt and [facility] TCU informed. Nisha [facility] stated facility \"may\" still have bed available on Monday, 9/28/20. SW will communicate with faciltiy on Monday to discuss pt's medical stability for discharge and to inquire about bed availability.     Assessment:  Pt disappointed with having to wait until at least monday for d/c. When SW voied he was sorry, pt responded \"so am I.\"     Plan:    Anticipated Disposition:  Facility:  TCU    Barriers to d/c plan:  Medical Stability    Follow Up:  SW will continue to follow pt through discharge planning. SHAHRAM will inquire about pt's medical readiness for discharge Monday 9/28/20 and [facility]    KOTA Waldron, [facility]  Acute Care Float   [facility]          "

## 2020-09-25 NOTE — PLAN OF CARE
"Edema 5B: Pt with shiny BLEs with soft 2+ pitting in distal LEs and firm 2+ pitting in feet bilaterally. Observable difference in size between LLE where GCBs were donned vs RLE that was left alone due to need to monitor BP. Continued with plan to alternate compression on LEs daily to manage edema, protect skin integrity, and promote tolerance for functional mobility. GCBs donned from MTPs to knee crease in RLE using \"quick wrap\" technique. Pt reporting comfort. Educated on need to remove if experiencing pain or numbness or wraps become soiled.  "

## 2020-09-25 NOTE — PLAN OF CARE
Discharge Planner OT   Patient plan for discharge: TCU   Current status: OT eval completed, treatment indicated. Edu on OT/POC/Discharge disposition. SBA for oral cares while long-sitting in bed, SBA for UB washing. Pt on RA, desat SpO2 87% w/activity. Edu on EC strategies, min verbal cues to implement w/task.   Barriers to return to prior living situation: medical status, decreased act mariama  Recommendations for discharge: TCU  Rationale for recommendations: to maximize IND in ADLs/IADLs        Entered by: Loren Abraham 09/25/2020 8:36 AM

## 2020-09-25 NOTE — PLAN OF CARE
Cares 2300 to 0700    /78 (BP Location: Right leg)   Pulse 82   Temp 97.6  F (36.4  C) (Oral)   Resp 18   Ht 1.524 m (5')   Wt 51.3 kg (113 lb 1.5 oz)   SpO2 94%   BMI 22.09 kg/m      Uneventful shift. Pt slept throughout shift. Denies pain/nausea/SOB. Lymphedema wrap on LLE. On tele. BG 60 this morning, provider aware, day RN notified.

## 2020-09-26 NOTE — PLAN OF CARE
Assumed cares 1500 to 1900.     BP (!) 141/80 (BP Location: Left arm)   Pulse 90   Temp 98.1  F (36.7  C) (Oral)   Resp 18   Ht 1.524 m (5')   Wt 51.3 kg (113 lb 1.5 oz)   SpO2 99%   BMI 22.09 kg/m       Pain: Reported in legs.  Neuro: Oriented.   Respiratory: Lung sounds diminished on RA. Pt denies SOB.   Cardiac/Neurovascular: HR and pulses WDL.   Tele = NSR with some T wave abnormality. Moderate UE and LE edema present. Lymph wrap on RLE. Extremities cool.   GI/: Bowel sounds active. Adequate UOP, purewick in place.  Nutrition: Working on dinner tray.  Activity: Up with 1 assist per report. Pt not OOB this shift.  Skin: Bottom and back have blanchable redness with mepilexes at sites. Wound on toe of L foot, dressing clean/dry/intact.  Lines: PIV and midline in LUE, both saline locked, sites WDL.     Plan: Cont to monitor.    Sheree Us RN on 9/25/2020 at 7:42 PM

## 2020-09-26 NOTE — PROGRESS NOTES
[facility], [facility] Progress Note - Hospitalist Service, Gold 5       Date of Admission:  9/21/2020  Assessment & Plan          76 yo F with PMHx of CAD s/p ADOLFO 2018, chronic HFpEF, pAfib not on anticoagulation, MS, breast cancer s/p lumpectomy and lymph node resection, GERD s/p multiple esophageal surgeries with multiple recent hospitalization due to weakness, falls, malnutrition and failure to thrive, who presents with worsening OSORIO for the last week from TCU, admitted on 9/21 for acute hypoxic respiratory failure, concerning for resolving aspiration pneumonia vs pulmonary edema due to anasarca, which is likely multifactorial from malnutrition, HFpEF, and advanced liver disease.      #Dypsnea on exertion, improved   #Acute hypoxic respiratory failure  #Possible aspiration pneumonia, resolved   #Pulmonary edema, improving   Patient endorses worsening shortness of breath on exertion over the last week. Also endorses weight gain, LE edema, and orthopnea. CXR prior to admission at TCU with LLL consolidation. Patient was started on flagyl and ceftin on 9/17 and developed oxygen requirement. On this admission, patient is currently 99% on RA without any tachypnea. CXR clear. COVID 19 negative. Procal 0.18. Patient completed 7 days of antibiotics total (ceftin/flagyl --> unasyn --> zosyn). Urine antigen strep pneumoniae and legionella negative.  TTE with moderate-severe tricuspid regurgitation, with pulmonary hypertension with PA pressure at least 37 mmHg.   DDx includes pulmonary hypertension vs valvular disease, vs hypervolemia due to anasarca and less likely aspiration pneumonia d/t esophageal strictures and alcohol use. SLP recommended regular diet with GERD precautions. O2 saturations 99% on RA at rest, but Ambulatory sats dropped to 80% on RA on 9/25 likely continues to have pulmonary edema.   - Diuresis as noted below.   - D-dimer, labs not yet obtained today  - Repeat  Ambulatory saturations today   - Will obtain CT chest if not improving with diuresis.   - IS      #Hypotension, resolved   Develops hypotension to SBP 60 on 9/22 after receiving IV diuretics.  Asymptomatic. DDx Hypovolemic vs septic. Improved with IVF and albumin, and did not require pressors. No source found for infectious work up.  Low suspicion for pulmonary source as noted above. Small ascites, but low suspicion for SBP. Patient was hypothermic and leukopenic, which has since resolved. Lactic flat. Procal 0.10. Blood cultures NGTD. Urine culture no growth.  AM cortisol 13.4. Patient is likely intravascularly depleted and third spacing causing low blood pressures.   -Midodrine 5 mg TID  -Follow up blood cultures, and urine culture, NGTD  -discontinue metoprolol     #Anasarca  #HFpEF exacerbation   Etiology unclear, but most likely due to underlying cirrhosis vs malnutrition vs HFpEF. UA not consistent with nephrotic syndrome. Albumin low at 1.1. Imaging with small amount of ascites, pleural effusions and significant extremity edema which is weeping. TTE with normal EF with tricuspid regurgitation and pulmonary hypertension. NT-proBNP elevated 2039. Bibasilar crackles on exam.   Attempted to diurese with IV lasix 40 mg BID, but developed hypotension as noted above. After fluid resuscitation and discontinuing metoprolol and starting midodrine, patient's BP has been stable and tolerated Lasix IV 20 mg daily.   -Lasix 20 mg IV x1 today, since unable to obtain any labs today   -Daily weights, not yet obtained today despite discussion with nursing  -Strict I&O  -Low salt diet   -PT consult for lymphedema      #Transaminitis  #Coagulopathy   #Hepatitc steatosis, with probable early cirrhosis   Chronically elevated Alk phos since 2017. Total bilirubin and AST/ALT mildly elevated since 8/2020. INR elevated at 1.62. Patient states she drinks daily, which per family is under reported. MRCP with hepatic steatosis with  cirrhotic morphology, also showing chronic CBD dilation to 12mm without any obstruction or mass unchanged from 2016. High suspicion for underlying cirrhosis. Small amount of ascites on imaging, denies any abd pain, fevers, or leukocytosis to suggest SBP. No signs of HE on exam. Platelet are interestingly normal.  Hepatology consulted, and felt that patient has advanced liver disease with steatosis, with possible early cirrhosis, and recommended alcohol cessation and improved nutrition.   -Hepatology consulted, appreciate recommendations  -Trend CMP and INR   -Diurese as noted above   -Follow up with GI, patient preferred follow up with her provider at [facility].      #Moderate alcohol use disorder   Reports drinking 1-2 drinks a day, but per family patient this is very under reported. No withdrawal symptoms during hospitalization.  Discussed sobriety, and patient agrees and wants to quit drinking, though also does not feel she has a problem with drinking. Offered CD treatment referral, but patient declined.   -Continue Folic acid, thiamine, and MVI.      #Macrocytic anemia  MCV quite elevated at 122. Hemoglobin baseline 10.0s. Dropped to 8.3 this morning. Recent work up in August with Vitamin B12 1679. Folate 7.6. TSH at 1.81. No bone marrow biopsy. SPEP at [facility] with moderate hypoalbuminemia with differential including liver disease, malnutrition, renal dx, GI loss, or inflammation. Unable to find UPEP on care-everywhere.  -Trend CBC  -If persists, recommended follow up with hematology in outpatient.      #Severe protein calore malnutrition   #Malnutrition:    Based on: reduced intake, moderate/severe fluid retention. Albumin 1.1. -Nutrition consulted  -Calorie counts.   -Continue supplements and high protein/calorie diet.      #Electrolyte derangements   #Hypokalemia  #Hypophosphatemia  #Hypocalcemia   Low phosphorus, Magnesium, and calcium.  -Replete with KCl 40 mEq TID  -Replete Mg and phos per nursing  SS  -Trend Mg, phos, and K    #Failure to thrive   Progressive weakness, with multiple falls and now unable to care for self over the last month. Infectious work up as noted above. Patient recently had CT chest/abd/pelvis to evaluation for possible underlying malignancy which was negative for any masses. Likely due to underlying co-morbidities and malnutrition, and alcohol use.   -PT/OT recommending TCU     #GERD  #Hx of esophageal stricture  #Dysphagia   S/p nissen fundoplication 2016 due to hiatal hernia. Endorses chronic regitation. Denies any dysphagia or odynophagia.   - Prilosec 20 mg BID.   - Carafate 1g QID      #IBS  #Diarrhea   Chronic diarrhea, worked up by GI in the past. Worse in setting of antibiotics. Recent C. Diff negaitve. TTG negative for celiac. Fecal fat normal.   -Continue PTA loperamide 2 mg QID PRN and probiotic.       #CAD  S/p ADOLFO x2 in 2018. Denies any chest pain. Troponin negative. Not on any statin.   -Continue ASA 81 mg daily. Metoprolol held for hypotension.      #Paroxysmal Atrial fibrillation   Not on any anticoagulation. Discontinued metoprolol with hypotension.      #Hx of MS  Hx of optic neuritis in her 30s with no hx of MS symptoms in decades. Recently evaluated by neurology [facility] and felt to be stable without any exacerbation, and recommended outpatient neurology follow up in 2 weeks with EMG and neuropathy work up.  Patient is not interested in getting EMG while inpatient.     #Hx of breast cancer s/p lumpectomy   S/p lumpectomy and lymph node resection. R>L UE edema chronically. RUE ultrasound without any DVT.        Diet: Snacks/Supplements Adult: Other; On 9/22, please send trial of Ensure Plant Based (2pm) and sugar free Gelatein (HS). Pt may order PRN; Between Meals  Advance Diet as Tolerated  Combination Diet Regular Diet Adult; High Kcal/High Protein Diet, ADULT; 2 gm NA Diet    DVT Prophylaxis: Enoxaparin (Lovenox) SQ  Whitt Catheter: not present  Code Status: No CPR- Do  NOT Intubate           Disposition Plan   Expected discharge: 2 - 3 days, recommended to transitional care unit once once optimized and diuresed and not longer hypoxic with ambulation. .  Entered: Lula Roberts PA-C 09/26/2020, 2:59 PM       The patient's care was discussed with the Attending Physician, Dr. Dennys George , Bedside Nurse, Patient and Patient's Family.    Lula Roberts PA-C  Hospitalist Service, [address], [place]  Pager: [phone]  Please see sticky note for cross cover information  ______________________________________________________________________    Interval History     Denies any SOB at rest. Has not gotten up today to know how her breathing is on exertion. Denies any F/C, cough, N/V, abdominal pain. Feels her swelling has improved. Disappointed in staying until Monday.     Labs not yet obtained today. Phlebotomy attempted x2. Awaiting help from vascular nursing.     Data reviewed today: I reviewed all medications, new labs and imaging results over the last 24 hours.     Physical Exam   Vital Signs: Temp: 96.7  F (35.9  C) Temp src: Oral BP: 135/67 Pulse: 94   Resp: 18 SpO2: 99 % O2 Device: None (Room air)    Weight: 113 lbs 1.54 oz   GENERAL: Alert and oriented x 3. NAD. Pleasant and conversational   HEENT: Anicteric sclera. EOMI. Mucous membranes moist   NECK: Trachea midline. No JVD on exam.   CARDIOVASCULAR: RRR. S1, S2. No murmurs, rubs, or gallops.   RESPIRATORY: Effort normal on RA. Bibasilar rales, with remaining lung fields CTA. Respirations unlabored.   GI: Abdomen soft, non-tender abdomen without rebound or guarding, normoactive bowel sounds present  EXTREMITIES: +2 pitting edema in all extremities, improved from yesterday. No calf asymmetry, erythema, or tenderness.   NEUROLOGICAL: No focal deficits. CN II-XII grossly intact. Moving all extremities symmetrically.   SKIN: Intact. Warm and dry. No rashes or lesions.  No jaundice.      Data   Recent Labs   Lab 09/25/20  0611 09/24/20  0534 09/23/20  0518  09/22/20  0712  09/21/20  1408   WBC 5.0 6.1 3.8*   < > 4.0  --  7.1   HGB 9.1* 9.1* 7.0*   < > 8.3*  --  10.9*   * 122* 125*   < > 122*  --  123*    285 198   < > 242  --  335   INR 1.67*  --   --   --  1.60*  --   --     142 141  --  142  --  137   POTASSIUM 3.4 3.2* 3.3*  --  4.0  --  4.6   CHLORIDE 112* 110* 108  --  108  --  107   CO2 24 24 25  --  26  --  26   BUN 3* 3* 4*  --  4*  --  5*   CR 0.60 0.55 0.56  --  0.64   < > 0.47*   ANIONGAP 8 7 8  --  7  --  4   ANNABELLE 7.8* 7.5* 7.2*  --  7.4*  --  7.7*   GLC 60* 76 101*  --  71  --  85   ALBUMIN 1.9* 1.9* 2.6*  --  1.1*  --  1.5*   PROTTOTAL 4.6* 4.7* 4.9*  --  4.0*  --  5.2*   BILITOTAL 1.5* 1.8* 1.5*  --  1.5*  --  2.1*   ALKPHOS 162* 164* 135  --  191*  --  244*   ALT 17 19 18  --  19  --  29   AST 36 32 25  --  37  --  Canceled, Test credited   TROPI  --   --   --   --   --   --  <0.015    < > = values in this interval not displayed.     No results found for this or any previous visit (from the past 24 hour(s)).  Medications       enoxaparin ANTICOAGULANT  40 mg Subcutaneous Q24H     folic acid  1 mg Oral Daily     gabapentin  100 mg Oral At Bedtime     gadobutrol  7.5 mL Intravenous Once     influenza vac high-dose quad  0.7 mL Intramuscular Prior to discharge     lactobacillus rhamnosus (GG)  1 capsule Oral BID     midodrine  5 mg Oral TID w/meals     multivitamin, therapeutic  1 tablet Oral Daily     omeprazole  20 mg Oral BID AC     potassium chloride  40 mEq Oral TID     sodium chloride (PF)  3 mL Intracatheter Q8H     sucralfate  1 g Oral 4x Daily AC & HS     vitamin B1  100 mg Oral Daily     venlafaxine  37.5 mg Oral Daily     [START ON 9/27/2020] vitamin D2  50,000 Units Oral Weekly

## 2020-09-26 NOTE — PROGRESS NOTES
Vascular Access Services Notes:    Lab draw done without complication via left upper arm midline.  was present to assist.        REMI BarnesN, RN [facility]

## 2020-09-26 NOTE — PLAN OF CARE
Assumed cares 0700 to 1900.     /68 (BP Location: Left arm)   Pulse 71   Temp 95.7  F (35.4  C) (Oral)   Resp 16   Ht 1.524 m (5')   Wt 49.9 kg (109 lb 14.4 oz)   SpO2 97%   BMI 21.46 kg/m       Pain: Reported in legs, arms, and head this shift. Admin tylenol x 1.   Neuro: Oriented but forgetful.  Respiratory: Lung sounds clear and equal on RA. But pt dyspneic on exertion.   Cardiac/Neurovascular: HR and pulses WDL. Tele shows NSR with T wave abnormality. UE and LE edema present. Lymph wrap on RLE. Extremities cool.   GI/: Bowel sounds active. 2 BMs today, one incontinent, stool liquid/loose. Admin imodium x 2 per pt request. Adequate UOP, purewick in place.  Nutrition: Poor appetite with nearly no intake.  Activity: Up with 1 assist per report. But when writer got pt up, pt was very weak; would recommend 2 assist. D/t extreme weakness, unable to get ambulatory saturations this shift.  Skin: Bottom and back have blanchable redness with mepilexes at sites, changed today. Wound on toe of L foot, dressing clean/dry/intact. Two new skin tears on RUE, cleansed and applied dressings. Pt also has skin tear on LLE, not new, but did also change dressing today.  Lines: PIV and midline in LUE, both saline locked, sites WDL.  Events: Admin 1 x dose of IV lasix.      Plan: Cont to monitor. Potential discharge Monday to TCU?      Sheree Us RN on 9/26/2020 at 7:03 PM

## 2020-09-26 NOTE — PLAN OF CARE
Edema 5B: patient with firm 2+ pitting remaining in distal LEs, reductions seen in feet. Reapplied GCB from MTPs to knee crease of R LE only due to need for BPs to L LE. Will continue to alternate LE wraps in order to allow BP readings to legs as well. Remove wraps if causing pain/numbness or become soiled.

## 2020-09-27 NOTE — PROGRESS NOTES
Rapid Response Team Note    Assessment   In assessment a rapid response was called on Minerva Blanco due to SIRS/Sepsis trigger, lactic acidosis and hypotension.     This presentation is likely due to shock and worsened by the comorbidities listed above. The patient is critically ill with lactic acidosis >4 and cardiogenic shock.  Acute deterioration is being managed by the following critical interventions: levophed.    Sepsis Evaluation   Sepsis status unclear, will start zosyn/vnaco    Plan   - Zosyn, Vanco  - Start levophed  - Transfer to MICU service    Disposition: The patient will be transferred to the ICU..    The Internal Medicine primary team was able to be reached and they are in agreement with the above plan.    Reassessment   Reassessment and plan follow-up will be performed by the ICU accepting team, with the plan and timeline for reassessment to be determined by the accepting ICU team.    Time Spent on this Encounter   Total Critical Care time spent by me, excluding procedures, was 15 minutes.    CLIVE Bueno CNP  [facility] RRT AMCOM Job Code Contact #[id number]    Hospital Course   Admission Diagnosis: Generalized muscle weakness [M62.81]     Brief Summary of events leading to rapid response:   A rapid response was called for Minerva Blanco due to hypotension and LA of 9.4.    The patients is not known to have an infection.    Significant Comorbidities:   CHF, Afib, CAD    Medications   Scheduled     aspirin  324 mg Oral Daily     atorvastatin  20 mg Oral QPM     folic acid  1 mg Oral Daily     gabapentin  100 mg Oral At Bedtime     gadobutrol  7.5 mL Intravenous Once     heparin ANTICOAGULANT Loading dose  60 Units/kg Intravenous Once     lactobacillus rhamnosus (GG)  1 capsule Oral BID     midodrine  5 mg Oral TID w/meals     multivitamin, therapeutic  1 tablet Oral Daily     omeprazole  20 mg Oral BID AC     sodium chloride (PF)  3 mL Intracatheter Q8H     sucralfate  1 g Oral 4x  Daily AC & HS     vitamin B1  100 mg Oral Daily     venlafaxine  37.5 mg Oral Daily     vitamin D2  50,000 Units Oral Weekly      PRN   acetaminophen, albuterol, carboxymethylcellulose PF, celecoxib, glucose **OR** dextrose **OR** glucagon, glucose **OR** dextrose **OR** glucagon, heparin, HYDROmorphone, ketotifen, lidocaine 4%, lidocaine viscous 2% 15 mL and maalox/mylanta w/ simethicone 15 mL (GI COCKTAIL), lidocaine (buffered or not buffered), magnesium sulfate, melatonin, methocarbamol, naloxone, omeprazole, ondansetron **OR** ondansetron, oxyCODONE, prochlorperazine **OR** prochlorperazine **OR** prochlorperazine, sodium chloride (PF), sodium phosphate, sodium phosphate, sodium phosphate   Allergies   Allergies   Allergen Reactions     Amoxicillin Diarrhea     Ativan [Lorazepam] Other (See Comments)     Hallucinations     Morphine Itching        Physical Exam   Temp: 95.7  F (35.4  C) Temp  Min: 95.7  F (35.4  C)  Max: 97.2  F (36.2  C)  Resp: 18 Resp  Min: 16  Max: 18  SpO2: 98 % SpO2  Min: 97 %  Max: 99 %  Pulse: 108 Pulse  Min: 65  Max: 108    No data recorded  BP: 94/59 Systolic (24hrs), Av , Min:89 , Max:110   Diastolic (24hrs), Av, Min:51, Max:72     I/Os: I/O last 3 completed shifts:  In: 120 [P.O.:120]  Out: 300 [Urine:300]     Exam:   General: acutely ill appearing  Mental Status: Less responsive than prior.    Significant Results and Procedures   Lactic Acid:   Recent Labs   Lab Test 20  1724 20  0518 20  2323  20  0407 20  1844 20  1949   LACT 9.4* 1.3 1.1   < > Canceled, Test credited  --   --    LACTS  --   --   --   --  1.2 2.8* 2.0    < > = values in this interval not displayed.     CBC:   Recent Labs   Lab Test 20  1336 20  0611 20  0534   WBC 8.1 5.0 6.1   HGB 10.4* 9.1* 9.1*   HCT 33.7* 28.3* 29.0*   * 291 285

## 2020-09-27 NOTE — PROGRESS NOTES
Pt absolutely refused regular lab draw, requesting US guided. Vas access team notified of pt's request and this writer received the call back vascular nurse. Awaiting their next availability to draw pt.

## 2020-09-27 NOTE — PROGRESS NOTES
"Social Work Services Progress Note    Hospital Day: 7  Date of Initial Social Work Evaluation:  Not completed   Consulted with:  MD and faiclity    Data:  Per charting patient is admitted with \"past medical history significant for MI, CAD s/p stent (2018), chronic HFpEF, MS, breast cancer s/p lumpectomy with lymph node, meniere's disease, paroxysmal afib, GERD s/p multiple esophageal surgeries who presented with worsening dyspnea and weakness. She was recently started on antibiotics for presumed LLL aspiration pneumonia, and noted to have hypoxia requiring supplemental O2 at TCU.\"    Intervention:  Per charting patient has been planning to return to [facility] TCU but had not been medically ready. MD today shared that patient will likely be medically ready for discharge tomorrow 9/28/20.     Phone call to admissions at [facility] @ [phone] and they reported they are currently full - they anticipate 2 discharges on Tuesday 9/29/20 early in the AM so they will assess patient for one of those openings. Called MD back and alerted them to this.     Assessment:  Patient requiring return to TCU after hospital discharge    Plan:    Anticipated Disposition:  Facility:  [facility]    Barriers to d/c plan:  Medical readiness and available bed at facility    Follow Up:  Will have weekday SWer f/u on Monday 9/28/20 with facility.    Antoinette ESCUDERO LICSW  9/27/2020    Searchable on [employer] - search SOCIAL WORK - for text paging options    SATURDAY AND SUNDAY AND HOLIDAYS  ON CALL PAGER [phone]       4A 4C 4E 5A 5B  [phone]    6A 6B 6C 6D    [phone]    5C 7A 7B 7C 7D  [phone]      FOR HOURS 1600 - midnight PAGER [phone]      "

## 2020-09-27 NOTE — PROVIDER NOTIFICATION
Elevated Lactic:  Writer, in role of unit charge RN, was notified by bedside RN Asiya that Lactic acid result was 9.4. Pt is in bed with transport and Float float RN, Tawana on the way to CT scan. Tawana says to call RRT but that she will take pt to Stat CT. RRT called and note added that pt is in CT now. Writer spoke to provider that responded to RRT, Colten Fleming NP, and told that pt is in CT. He went down to CT to see patient. Colten told writer that patient will need to go to the ICU now. Writer also spoke to attending MD Dennys Geogre to update him with the plan.

## 2020-09-27 NOTE — PHARMACY-VANCOMYCIN DOSING SERVICE
Pharmacy Vancomycin Initial Note  Date of Service 2020  Patient's  1945  75 year old, female    Indication: Sepsis    Current estimated CrCl = Estimated Creatinine Clearance: 63.5 mL/min (based on SCr of 0.6 mg/dL).    Creatinine for last 3 days  2020:  6:11 AM Creatinine 0.60 mg/dL  2020:  1:36 PM Creatinine 0.60 mg/dL    Recent Vancomycin Level(s) for last 3 days  No results found for requested labs within last 72 hours.      Vancomycin IV Administrations (past 72 hours)      No vancomycin orders with administrations in past 72 hours.                Nephrotoxins and other renal medications (From now, onward)    Start     Dose/Rate Route Frequency Ordered Stop    20  vancomycin (VANCOCIN) 1000 mg in dextrose 5% 200 mL PREMIX      1,000 mg  200 mL/hr over 1 Hours Intravenous EVERY 24 HOURS 20 18420 1900  piperacillin-tazobactam (ZOSYN) 4.5 g vial to attach to  mL bag      4.5 g  over 30 Minutes Intravenous EVERY 6 HOURS 20 1836      20 1900  vancomycin 1250 mg in 0.9% NaCl 250 mL intermittent infusion 1,250 mg      1,250 mg  over 90 Minutes Intravenous ONCE 20 1844      20 1830  norepinephrine (LEVOPHED) 16 mg in  mL infusion      0.03-0.4 mcg/kg/min × 48.9 kg  1.4-18.3 mL/hr  Intravenous CONTINUOUS 20 1828      20 0000  furosemide (LASIX) 20 MG tablet      20 mg Oral DAILY 20 1305            Contrast Orders - past 72 hours (72h ago, onward)    Start     Dose/Rate Route Frequency Ordered Stop    20 1700  iopamidol (ISOVUE-370) solution 66 mL      66 mL Intravenous ONCE 20 1651 20 18020 1100  gadobutrol (GADAVIST) injection 7.5 mL      7.5 mL Intravenous ONCE 20 1057                  Plan:  1.  Give vancomycin 1250mg x1 now, then start vancomycin 1000mg IV q24 hours   2.  Goal Trough Level: 15-20 mg/L   3.  Pharmacy will check trough levels as appropriate in 1-3 Days.     4. Serum creatinine levels will be ordered daily for the first week of therapy and at least twice weekly for subsequent weeks.    5. Tibbie method utilized to dose vancomycin therapy: Method 2    Fred Orr, PharmD, BCPS

## 2020-09-27 NOTE — PROGRESS NOTES
[facility], [facility] Progress Note - Hospitalist Service, Gold 5       Date of Admission:  9/21/2020  Assessment & Plan              74 yo F with PMHx of CAD s/p ADOLFO 2018, chronic HFpEF, pAfib not on anticoagulation, MS, breast cancer s/p lumpectomy and lymph node resection, severe GERD s/p multiple esophageal surgeries with multiple recent hospitalization due to weakness, falls, malnutrition and failure to thrive, who presents with worsening OSORIO for the last week from TCU, admitted on 9/21 for acute hypoxic respiratory failure, likely due to resolving aspiration pneumonia vs pulmonary edema due to anasarca, which is likely multifactorial from malnutrition, HFpEF, and advanced liver disease.      #Dypsnea on exertion, improved   #Acute hypoxic respiratory failure   #Possible aspiration pneumonia, resolved   #Pulmonary edema, improving   Patient endorses worsening shortness of breath on exertion over the last week PTA. Also endorses weight gain, LE edema, and orthopnea. CXR prior to admission at TCU with LLL consolidation. Patient was started on flagyl and ceftin on 9/17 and developed oxygen requirement. On this admission, patient is currently 99% on RA without any tachypnea. CXR on admission clear. COVID 19 negative. Procal 0.18. Patient completed 7 days of antibiotics total (ceftin/flagyl --> unasyn --> zosyn). Urine antigen strep pneumoniae and legionella negative.  TTE with moderate-severe tricuspid regurgitation, with pulmonary hypertension with PA pressure at least 37 mmHg.   DDx includes pulmonary hypertension vs valvular disease, vs hypervolemia due to anasarca and less likely aspiration pneumonia d/t esophageal strictures and alcohol use. SLP recommended regular diet with GERD precautions. O2 saturations 99% on RA at rest, but Ambulatory sats dropped to 80% on RA on 9/25 likely continues to have pulmonary edema. D-dimer negative, low suspicion for underlying PE.    - Diuresis as noted below.   - Daily Ambulatory saturations, today desaturated to 80% again today   - Will obtain CT chest if not improving with diuresis.   - IS      #Anasarca  #HFpEF exacerbation   Etiology unclear, but most likely due to underlying cirrhosis vs malnutrition vs HFpEF. UA not consistent with nephrotic syndrome. Albumin low at 1.1. Imaging with small amount of ascites, pleural effusions and significant extremity edema which is weeping. TTE with normal EF with tricuspid regurgitation and pulmonary hypertension. NT-proBNP elevated 2039. Bibasilar crackles on exam.   Attempted to diurese with IV lasix 40 mg BID, but developed hypotension as noted below. After fluid resuscitation and discontinuing metoprolol and starting midodrine, patient's BP has been stable and tolerated Lasix IV 20 mg daily.   -Continue lasix 20 mg IV daily  -Trend BMP daily   -Daily weights, 107 lbs on 9/26. Unclear dry weight, ?100lbs .   -Strict I&O  -Low salt diet   -PT consult for lymphedema   -Consider outpatient follow up with cardiology especially with pulmonary HTN for further work up ?RHC    #Abdominal pain   Patient developed acute onset abdominal pain, epigastric 8/10 in severity, that stared after eating breakfast this morning. Progressively worsening, associated with nausea and worsening SOB. Endorses pain radiating through her neck, shoulder and L>R arms similar to prior MI. Denies any CP. Denies any hx of pancreatitis. Abd exam epigastric pain on palpation without rebound tenderness of peritoneal signs. LFTs at baseline. Continues to have diarrhea, last this AM. DDx atypical ACS, pancreatitis, PUD. EKG with non-specific ST changes. Lipase WNL. KUB without obstructive gas pattern. CXR with bilateral pleural effusions. Troponin elevated at 0.728, management as noted below.   -Chronic diarrhea, but with new abd pain and recent abx will obtain C. Diff   -Oxycodone 5 mg Q6H PRN, IV dilaudid 0.2 Q4H PRN breakthrough    -With elevated trop --> CTA chest, abdomen, pelvis     #Elevated troponin   #NSTEMI, demand   Endorsed atypical chest pain symptoms with nausea, diaphoresis, shoulder and neck pain. EKG with non-specific ST changes but no ST elevations. Troponin elevated at 0.728.   -Telemetry, transfer to telemetry bed   -Cycle troponins and EKG   - mg daily, start atorvastatin 20 mg daily  -Heparin drip   -Cardiology consulted and called     #Hypotension, resolved   Develops hypotension to SBP 60 on 9/22 after receiving IV diuretics.  Asymptomatic. DDx Hypovolemic vs septic. Improved with IVF and albumin, and did not require pressors. No source found for infectious work up.  Low suspicion for pulmonary source as noted above. Small ascites, but low suspicion for SBP. Patient was hypothermic and leukopenic, which has since resolved. Lactic flat. Procal 0.10. Blood cultures NGTD. Urine culture no growth.  AM cortisol 13.4. Patient is likely intravascularly depleted and third spacing causing low blood pressures.   -Midodrine 5 mg TID  -Discontinue metoprolol      #Transaminitis  #Coagulopathy   #Hepatitc steatosis, with probable early cirrhosis   Chronically elevated Alk phos since 2017. Total bilirubin and AST/ALT mildly elevated since 8/2020. INR elevated at 1.62. Patient states she drinks daily, which per family is under reported. MRCP with hepatic steatosis with cirrhotic morphology, also showing chronic CBD dilation to 12mm without any obstruction or mass unchanged from 2016. High suspicion for underlying cirrhosis. Small amount of ascites on imaging, denies any abd pain, fevers, or leukocytosis to suggest SBP. No signs of HE on exam. Platelet normal.  Hepatology consulted, and felt that patient has advanced liver disease with steatosis, with possible early cirrhosis, and recommended alcohol cessation and improved nutrition.   -Hepatology consulted, appreciate recommendations  -Trend CMP and INR   -Diurese as noted above    -Follow up with GI, patient preferred follow up with her provider at [facility].      #Moderate alcohol use disorder   Reports drinking 1-2 drinks a day, but per family patient this is very under reported. No withdrawal symptoms during hospitalization.  Discussed sobriety, and patient agrees and wants to quit drinking, though also does not feel she has a problem with drinking. Offered CD treatment referral, but patient declined.   -Continue Folic acid, thiamine, and MVI.      #Macrocytic anemia  MCV quite elevated at 122. Hemoglobin baseline 10.0s. Dropped to 8.3 this morning. Recent work up in August with Vitamin B12 1679. Folate 7.6. TSH at 1.81. No bone marrow biopsy. [place] at [facility] with moderate hypoalbuminemia with differential including liver disease, malnutrition, renal dx, GI loss, or inflammation. Unable to find UPEP on care-everywhere.  -Trend CBC  -If persists, recommended follow up with hematology in outpatient.      #Severe protein calore malnutrition   #Malnutrition:    Based on: reduced intake, moderate/severe fluid retention. Albumin 1.1.   -Nutrition consulted  -Calorie counts.   -Continue supplements and high protein/calorie diet.      #Electrolyte derangements   #Hypokalemia  #Hypophosphatemia  #Hypocalcemia   Low phosphorus, Magnesium, and calcium.  -Hold potassium supplement with K 5.4   -Replete Mg and phos per nursing SS  -Trend Mg, phos, and K daily     #Failure to thrive   Progressive weakness, with multiple falls and now unable to care for self over the last month. Infectious work up as noted above. Patient recently had CT chest/abd/pelvis to evaluation for possible underlying malignancy which was negative for any masses. Likely due to underlying co-morbidities and malnutrition, and alcohol use.   -PT/OT recommending TCU, Bed will likely be available on 9/29      #GERD  #Hx of esophageal stricture  #Dysphagia   S/p nissen fundoplication 2016 due to hiatal hernia. Endorses chronic  regitation. Denies any dysphagia or odynophagia.   - Prilosec 20 mg BID.   - Carafate 1g QID      #IBS  #Diarrhea   Chronic diarrhea, worked up by GI in the past. Worse in setting of antibiotics. Recent C. Diff negaitve. TTG negative for celiac. Fecal fat normal.   -Continue PTA loperamide 2 mg QID PRN and probiotic.       #CAD  S/p ADOLFO x2 in 2018. Denies any chest pain. Troponin negative. Not on any statin.   -Continue ASA 81 mg daily. Metoprolol held for hypotension.      #Paroxysmal Atrial fibrillation   Not on any anticoagulation. Discontinued metoprolol with hypotension.      #Hx of MS  Hx of optic neuritis in her 30s with no hx of MS symptoms in decades. Recently evaluated by neurology [facility] and felt to be stable without any exacerbation, and recommended outpatient neurology follow up in 2 weeks with EMG and neuropathy work up.  Patient is not interested in getting EMG while inpatient.     #Hx of breast cancer s/p lumpectomy   S/p lumpectomy and lymph node resection. R>L UE edema chronically. RUE ultrasound without any DVT.          Diet: Snacks/Supplements Adult: Other; On 9/22, please send trial of Ensure Plant Based (2pm) and sugar free Gelatein (HS). Pt may order PRN; Between Meals  Advance Diet as Tolerated  Combination Diet Regular Diet Adult; High Kcal/High Protein Diet, ADULT; 2 gm NA Diet    DVT Prophylaxis: Enoxaparin (Lovenox) SQ  Whitt Catheter: not present  Code Status: No CPR- Do NOT Intubate , will need to update paperwork.          Disposition Plan   Expected discharge: Tomorrow, recommended to transitional care unit once improved volume status and improve ambulatory saturations.   Entered: Lula Roberts PA-C 09/27/2020, 7:50 AM       The patient's care was discussed with the Attending Physician, Dr. Dennys Geroge , Bedside Nurse, Patient and Patient's Family.    Lula Roberts PA-C  Hospitalist Service [address], [place]  Pager:  [phone]  Please see sticky note for cross cover information  ______________________________________________________________________    Interval History   No complaints today. Denies any SOB at rest, CP, N/V, abdominal pain or urinary symptoms. Diarrhea improved today, though would like to have loperamide just in case. Feels her GERD symptoms are stable.     Re-evaluated patient around 2:30PM for generalized abdominal pain worse in epigastrium, generalized unwell feeling, diaphoresis, nausea, bilateral L>R shoulder pain and neck pain. States she has had bilateral shoulder and neck pain with prior MI. Denies any chest pain, but endorses worsening dyspnea. Denies any hx of pancreatitis. Tried tylenol and tramadol without any effect. Allergy to morphine.      Data reviewed today: I reviewed all medications, new labs and imaging results over the last 24 hours.    Physical Exam   Vital Signs: Temp: 97.2  F (36.2  C) Temp src: Oral BP: 107/72 Pulse: 85   Resp: 16 SpO2: 98 % O2 Device: None (Room air)    Weight: 109 lbs 14.4 oz  GENERAL: Alert and oriented x 3. NAD. Pleasant and conversational   HEENT: Anicteric sclera. EOMI. Mucous membranes moist   NECK: Trachea midline.   CARDIOVASCULAR: RRR. S1, S2. No murmurs, rubs, or gallops.   RESPIRATORY: Effort normal on RA. Bibasilar rales with remaining lung fields CTA without any rhonchi or wheezing. respirations unlabored   GI: Abdomen soft, non-tender abdomen without rebound or guarding, normoactive bowel sounds present  EXTREMITIES: +1 pitting edema in LE bilaterally. Improved in upper extremities. No calf asymmetry, erythema, or tenderness.   NEUROLOGICAL: No focal deficits. CN II-XII grossly intact. Moving all extremities symmetrically.   SKIN: Intact. Warm and dry. No rashes or lesions.  No jaundice.    Data   Recent Labs   Lab 09/25/20  0611 09/24/20  0534 09/23/20  0518  09/22/20  0712  09/21/20  1408   WBC 5.0 6.1 3.8*   < > 4.0  --  7.1   HGB 9.1* 9.1* 7.0*   < >  8.3*  --  10.9*   * 122* 125*   < > 122*  --  123*    285 198   < > 242  --  335   INR 1.67*  --   --   --  1.60*  --   --     142 141  --  142  --  137   POTASSIUM 3.4 3.2* 3.3*  --  4.0  --  4.6   CHLORIDE 112* 110* 108  --  108  --  107   CO2 24 24 25  --  26  --  26   BUN 3* 3* 4*  --  4*  --  5*   CR 0.60 0.55 0.56  --  0.64   < > 0.47*   ANIONGAP 8 7 8  --  7  --  4   ANNABELLE 7.8* 7.5* 7.2*  --  7.4*  --  7.7*   GLC 60* 76 101*  --  71  --  85   ALBUMIN 1.9* 1.9* 2.6*  --  1.1*  --  1.5*   PROTTOTAL 4.6* 4.7* 4.9*  --  4.0*  --  5.2*   BILITOTAL 1.5* 1.8* 1.5*  --  1.5*  --  2.1*   ALKPHOS 162* 164* 135  --  191*  --  244*   ALT 17 19 18  --  19  --  29   AST 36 32 25  --  37  --  Canceled, Test credited   TROPI  --   --   --   --   --   --  <0.015    < > = values in this interval not displayed.     No results found for this or any previous visit (from the past 24 hour(s)).  Medications       enoxaparin ANTICOAGULANT  40 mg Subcutaneous Q24H     folic acid  1 mg Oral Daily     gabapentin  100 mg Oral At Bedtime     gadobutrol  7.5 mL Intravenous Once     lactobacillus rhamnosus (GG)  1 capsule Oral BID     midodrine  5 mg Oral TID w/meals     multivitamin, therapeutic  1 tablet Oral Daily     omeprazole  20 mg Oral BID AC     potassium chloride  40 mEq Oral TID     sodium chloride (PF)  3 mL Intracatheter Q8H     sucralfate  1 g Oral 4x Daily AC & HS     vitamin B1  100 mg Oral Daily     venlafaxine  37.5 mg Oral Daily     vitamin D2  50,000 Units Oral Weekly

## 2020-09-27 NOTE — PLAN OF CARE
"Assumed cares 7800-2138    /72 (BP Location: Left leg)   Pulse 85   Temp 97.2  F (36.2  C) (Oral)   Resp 16   Ht 1.524 m (5')   Wt 49.9 kg (109 lb 14.4 oz)   SpO2 98%   BMI 21.46 kg/m      Pain: Yes, resolved with PRN tylenol  Neuro: A&Ox4  Respiratory: Dyspnea on exertion  Cardiac/Neurovascular: WDL  GI/: Occasionally incontinent, no BM this shift   Nutrition: Poor appetite  Activity: x2 assist if out of bed, pt is very weak.   Skin: Skin tears, bruising, incision, midline, PIV  Access: Midline, PIV. Saline locked.    Events this shift: Pt did not want to do ambulatory O2 saturation assessment stating that she was \"wiped out from today\"    Plan: Continue to monitor and follow plan of care.     "

## 2020-09-27 NOTE — PROGRESS NOTES
Pt lost about 1kg since yesterday. Alert and oriented, O2 sats was 98% consistently on RA at rest. After ambulated about 5 steps to the scale and back in chair, pt c/o feeling SOB but O2 sats remained at 93-94% on RA and quickly bounded back up to 98%. Will take pt for a walk later after breakfast.

## 2020-09-27 NOTE — PROGRESS NOTES
Pt was stable this am then started to c/o abd pain and nausea this afternoon. Tylenol and antiemetics given per requested but symptoms persisted. Medicine team was notified. Tramadol po given but pt appeared quite uncomfortable. Vascular nurse was not able to draw lab until 1530. Pt had late breakfast and declined lunch. BG 69 but pt declined apple juice and BG dropped to 57 2 hrs later. 15 gm of Glucose gel given. BG came back at 74 about 20 min later.  Trop came back at 0.728. Pt was then ordered to have Tele on, Heparin drip started, and abd/pelvic CT. Float nurse was notified to help care for pt. 25 mL of D50% given to bring BG above 100 per protocol for transport. Lab notified this writer that Lactic acid was at 9.4 as pt was on her way to CT. This writer was paging primary team as charge nurse was calling for a [place] respond. Pt was transferred to  after CT.

## 2020-09-27 NOTE — PLAN OF CARE
Edema 5B: Patient with firm 1+/2+ pitting edema in distal legs. Fit patient with size F comperm compression stockings to B LEs from MTPs to knee creases for further edema management. Stockings may be removed for BP readings as needed. Compression stockings can be worn 23/24 hours per day and removed daily for skin cares - see patient care order for details.

## 2020-09-27 NOTE — PROGRESS NOTES
Pt requiring US for lab draw.    Attempted to draw labs off of upper arm midline. No blood return.    Due to edema in left arm unable to locate vein for lab draw.    With assistance from Lula KNOX from Medicine decision was made to access vein on Right upper arm. Pt has history mastectomy with node removal 12 years ago. Made access to the vein, ayala labs and removed access. 5cc of blood drawn and handed to .    Time with patient 25 mins

## 2020-09-28 PROBLEM — N17.9 ACUTE KIDNEY FAILURE, UNSPECIFIED (H): Status: ACTIVE | Noted: 2020-01-01

## 2020-09-28 NOTE — H&P
MICU Admission  History &Physical  Minerva Blanco MRN: [id number]  Age: 75 year old, : 1945  Date of Admission:2020  Primary care provider: Clinic, [facility]          Chief Complaint  Hypotension         History of Present Illness  Patient admitted from TCU after multiple recent admissions with concerns over dyspnea. Admitted once to ICU earlier in her stay  due to transient hypotension.  ABX broadened at that time due to concern for infection but no E/O infection on workup so stopped.   She appears quite volume overloaded and has been diuresing with lasix 20mg daily.       Today she had onset of abd pain and hypotension.  CT scan without obvious cause.  Transferred to ICU where she was started on pressors and had CVC and arterial lines placed.  POCUS with poor cardiac function so discussed with cardiology.  Concern for Takotsubo CM, but likely also some degree of mixed cardiogenic and septic shock.      Currently she reports her abd pain has improved.  She is tired and just wants to sleep.  I called her daughter who was able to visit earlier in the day and let her know the clinical picture is quite grim.  She expressed that she had understood this when she visited earlier and has said good bye to her mother.  She understands we have limited options available to treat her at this time and would not want any further invasive procedures in accordance with mother's DNR/DNI.                   Past Medical History  Past Medical History:   Diagnosis Date     Breast cancer (H)     right side - lumpectomy, chemo, and local radiation     Chronic infection     infection/wound for two years after esoph surgery     Compression fracture of spine (H)     T12-L1     Coronary artery disease 2018    s/p PCI with ADOLFO to proximal and mid RCA     Esophageal perforation      Fibromyalgia      GERD (gastroesophageal reflux disease)      Hiatal hernia      IBS  (irritable bowel syndrome)      Meniere's disease     deaf left ear     Multiple sclerosis (H)      Noninfectious ileitis      Other chronic pain      Paroxysmal atrial fibrillation (H)              Past Surgical History  Past Surgical History:   Procedure Laterality Date     APPENDECTOMY       BACK SURGERY  5/1/2015    lumbar fusion     BRONCHOSCOPY FLEXIBLE N/A 4/17/2017    Procedure: BRONCHOSCOPY FLEXIBLE;;  Surgeon: Jens Wise MD;  [facility]     C GASTROSTOMY/JEJUN TUBE      J tube for feedings     CLOSE SPIT FISTULA N/A 4/17/2017    Procedure: CLOSE SPIT FISTULA;;  Surgeon: Jens Wise MD;  [facility]     COMBINED ESOPHAGOSCOPY, GASTROSCOPY, DUODENOSCOPY (EGD), REMOVE ESOPHAGEAL STENT N/A 8/26/2016    Procedure: COMBINED ESOPHAGOSCOPY, GASTROSCOPY, DUODENOSCOPY (EGD), REMOVE ESOPHAGEAL STENT;  Surgeon: Jens Wise MD;  [facility]     COMBINED ESOPHAGOSCOPY, GASTROSCOPY, DUODENOSCOPY (EGD), REMOVE ESOPHAGEAL STENT N/A 2/12/2018    Procedure: COMBINED ESOPHAGOSCOPY, GASTROSCOPY, DUODENOSCOPY (EGD), REMOVE ESOPHAGEAL STENT;  Esophagogastroduodenoscopy With Stent Removal;  Surgeon: Jens Wise MD;  [facility]     COMBINED ESOPHAGOSCOPY, GASTROSCOPY, DUODENOSCOPY (EGD), REPLACE ESOPHAGEAL STENT N/A 8/25/2017    Procedure: COMBINED ESOPHAGOSCOPY, GASTROSCOPY, DUODENOSCOPY (EGD), REPLACE ESOPHAGEAL STENT;;  Surgeon: Jens Wise MD;  [facility]     COMBINED ESOPHAGOSCOPY, GASTROSCOPY, DUODENOSCOPY (EGD), REPLACE ESOPHAGEAL STENT N/A 9/15/2017    Procedure: COMBINED ESOPHAGOSCOPY, GASTROSCOPY, DUODENOSCOPY (EGD), REPLACE ESOPHAGEAL STENT;  Upper Endoscopy with Stent Exchange, Cauterization of Tracheosophageal Fistula, Cauterization of Chest Wound   ;  Surgeon: Jens Wise MD;  [facility]     COMBINED ESOPHAGOSCOPY, GASTROSCOPY, DUODENOSCOPY (EGD), REPLACE ESOPHAGEAL STENT N/A 10/20/2017    Procedure:  COMBINED ESOPHAGOSCOPY, GASTROSCOPY, DUODENOSCOPY (EGD), REPLACE ESOPHAGEAL STENT;  Upper Endoscopy with Stent Exchange, Cauterization Tracheosphageal Fistula Tract;  Surgeon: Nalini Barreto MD;  [facility]     COMBINED ESOPHAGOSCOPY, GASTROSCOPY, DUODENOSCOPY (EGD), REPLACE ESOPHAGEAL STENT N/A 11/3/2017    Procedure: COMBINED ESOPHAGOSCOPY, GASTROSCOPY, DUODENOSCOPY (EGD), REPLACE ESOPHAGEAL STENT;  Esophagogastroduodenoscopy, Stent Revision, Cauterization Of Traceoesophageal Fistula and stent exchange;  Surgeon: Nalini Barreto MD;  [facility]     COMBINED ESOPHAGOSCOPY, GASTROSCOPY, DUODENOSCOPY (EGD), REPLACE ESOPHAGEAL STENT N/A 11/17/2017    Procedure: COMBINED ESOPHAGOSCOPY, GASTROSCOPY, DUODENOSCOPY (EGD), REPLACE ESOPHAGEAL STENT;  Esophagogastroduodenoscopy, Esophogeal Stent Removal, Esophogeal Stent Placement ([id number] EndoMAXX), Cauterization Of Fistula Tract;  Surgeon: Nalini Barreto MD;  [facility]     CREATE SPIT FISTULA N/A 1/9/2017    Procedure: CREATE SPIT FISTULA;  Surgeon: Jens Wise MD;  [facility]     ESOPHAGECTOMY N/A 1/9/2017    Procedure: ESOPHAGECTOMY;  Surgeon: Jens Wise MD;  [facility]     ESOPHAGOSCOPY, GASTROSCOPY, DUODENOSCOPY (EGD), COMBINED N/A 4/18/2016    Procedure: COMBINED ESOPHAGOSCOPY, GASTROSCOPY, DUODENOSCOPY (EGD);  Surgeon: Alexis Barraza MD;  [facility]     ESOPHAGOSCOPY, GASTROSCOPY, DUODENOSCOPY (EGD), COMBINED N/A 4/25/2016    Procedure: COMBINED ESOPHAGOSCOPY, GASTROSCOPY, DUODENOSCOPY (EGD);  Surgeon: Alexis Barraza MD;  [facility]     ESOPHAGOSCOPY, GASTROSCOPY, DUODENOSCOPY (EGD), COMBINED N/A 5/4/2016    Procedure: COMBINED ESOPHAGOSCOPY, GASTROSCOPY, DUODENOSCOPY (EGD);  Surgeon: Alexis Barraza MD;  [facility]     ESOPHAGOSCOPY, GASTROSCOPY, DUODENOSCOPY (EGD), COMBINED N/A 5/18/2016    Procedure: COMBINED ESOPHAGOSCOPY, GASTROSCOPY, DUODENOSCOPY (EGD);   Surgeon: Alexis Barraza MD;  [facility]     ESOPHAGOSCOPY, GASTROSCOPY, DUODENOSCOPY (EGD), COMBINED N/A 6/22/2016    Procedure: COMBINED ESOPHAGOSCOPY, GASTROSCOPY, DUODENOSCOPY (EGD);  Surgeon: Alexis Barraza MD;  [facility]     ESOPHAGOSCOPY, GASTROSCOPY, DUODENOSCOPY (EGD), COMBINED N/A 7/12/2016    Procedure: COMBINED ESOPHAGOSCOPY, GASTROSCOPY, DUODENOSCOPY (EGD);  Surgeon: Jens Wise MD;  [facility]     ESOPHAGOSCOPY, GASTROSCOPY, DUODENOSCOPY (EGD), COMBINED N/A 4/21/2017    Procedure: COMBINED ESOPHAGOSCOPY, GASTROSCOPY, DUODENOSCOPY (EGD);  Esophagogastroduodenoscopy, Pharyngostomy Tube Placement ;  Surgeon: Jens Wise MD;  [facility]     ESOPHAGOSCOPY, GASTROSCOPY, DUODENOSCOPY (EGD), COMBINED N/A 5/19/2017    Procedure: COMBINED ESOPHAGOSCOPY, GASTROSCOPY, DUODENOSCOPY (EGD);  Upper Endoscopy, Irrigation and Debridement of Chest Wound ;  Surgeon: Nalini Barreto MD;  [facility]     ESOPHAGOSCOPY, GASTROSCOPY, DUODENOSCOPY (EGD), COMBINED N/A 5/23/2017    Procedure: COMBINED ESOPHAGOSCOPY, GASTROSCOPY, DUODENOSCOPY (EGD);;  Surgeon: Nalini Barreto MD;  [facility]     ESOPHAGOSCOPY, GASTROSCOPY, DUODENOSCOPY (EGD), COMBINED N/A 6/27/2017    Procedure: COMBINED ESOPHAGOSCOPY, GASTROSCOPY, DUODENOSCOPY (EGD);  Esophagogastroduodenoscopy With Removal And Replacement Of Esophageal Stent exchange. Exchange of pharyngtomy tube. Chest wound dressing change;  Surgeon: Nalini Barreto MD;  [facility]     ESOPHAGOSCOPY, GASTROSCOPY, DUODENOSCOPY (EGD), COMBINED N/A 8/4/2017    Procedure: COMBINED ESOPHAGOSCOPY, GASTROSCOPY, DUODENOSCOPY (EGD);  Esophagogastroduodenoscopy, Stent Removal and Stent Replacement. Dressing Change ;  Surgeon: Nalini Barreto MD;  [facility]     ESOPHAGOSCOPY, GASTROSCOPY, DUODENOSCOPY (EGD), COMBINED N/A 8/25/2017    Procedure: COMBINED ESOPHAGOSCOPY, GASTROSCOPY, DUODENOSCOPY (EGD);   Esophagogastroduodenoscopy,  Stent Revision,  Cauterization;  Surgeon: Jens Wise MD;  [facility]     ESOPHAGOSCOPY, GASTROSCOPY, DUODENOSCOPY (EGD), COMBINED N/A 10/2/2017    Procedure: COMBINED ESOPHAGOSCOPY, GASTROSCOPY, DUODENOSCOPY (EGD);  Esophagogastroduodenostomy, Replacement of Esophageal Stent, Cauterization of Tracheosophageal Fistula anc chest wall,   C-arm;  Surgeon: Nalini Barreto MD;  [facility]     ESOPHAGOSCOPY, GASTROSCOPY, DUODENOSCOPY (EGD), DILATATION, COMBINED N/A 6/29/2016    Procedure: COMBINED ESOPHAGOSCOPY, GASTROSCOPY, DUODENOSCOPY (EGD), DILATATION;  Surgeon: Jens Wise MD;  [facility]     EXPLORE NECK N/A 5/19/2017    Procedure: EXPLORE NECK;;  Surgeon: Nalini Barreto MD;  [facility]     HC UGI ENDOSCOPY W TRANSENDOSCOPIC STENT PLACEMENT N/A 7/12/2016    Procedure: COMBINED ESOPHAGOSCOPY, GASTROSCOPY, DUODENOSCOPY (EGD), PLACE TRANSENDOSCOPIC ESOPHAGEAL STENT;  Surgeon: Jens Wise MD;  [facility]     HC UGI ENDOSCOPY W TRANSENDOSCOPIC STENT PLACEMENT N/A 7/22/2016    Procedure: COMBINED ESOPHAGOSCOPY, GASTROSCOPY, DUODENOSCOPY (EGD), PLACE TRANSENDOSCOPIC ESOPHAGEAL STENT;  Surgeon: Jens Wise MD;  [facility]     INCISION AND DRAINAGE CHEST WASHOUT, COMBINED N/A 5/27/2017    Procedure: COMBINED INCISION AND DRAINAGE CHEST WASHOUT;  Chest washout;  Surgeon: Nalini Barreto MD;  [facility]     INCISION AND DRAINAGE CHEST WASHOUT, COMBINED N/A 5/28/2017    Procedure: COMBINED INCISION AND DRAINAGE CHEST WASHOUT;  Chest washout;  Surgeon: Nalini Barreto MD;  [facility]     INCISION AND DRAINAGE CHEST WASHOUT, COMBINED N/A 6/9/2017    Procedure: COMBINED INCISION AND DRAINAGE CHEST WASHOUT;  Chest washout and dressing change, Esophaogastroduodenoscopy;  Surgeon: Jens Wise MD;  [facility]     INCISION AND DRAINAGE CHEST WASHOUT, COMBINED N/A 7/17/2017    Procedure:  COMBINED INCISION AND DRAINAGE CHEST WASHOUT;  Incision and Drainage of right Chest Wound, Esophagogastroduodenoscopy with stent exchange. pharangostomy tube revision;  Surgeon: Jens Wise MD;  [facility]     IRRIGATION AND DEBRIDEMENT CHEST WASHOUT, COMBINED N/A 6/29/2016    Procedure: COMBINED IRRIGATION AND DEBRIDEMENT CHEST WASHOUT;  Surgeon: Jens Wise MD;  [facility]     IRRIGATION AND DEBRIDEMENT CHEST WASHOUT, COMBINED N/A 5/23/2017    Procedure: COMBINED IRRIGATION AND DEBRIDEMENT CHEST WASHOUT;  COMBINED IRRIGATION AND DEBRIDEMENT CHEST WASHOUT,COMBINED ESOPHAGOSCOPY, GASTROSCOPY, DUODENOSCOPY (EGD) ;  Surgeon: aNlini Barreto MD;  [facility]     IRRIGATION AND DEBRIDEMENT CHEST WASHOUT, COMBINED N/A 5/24/2017    Procedure: COMBINED IRRIGATION AND DEBRIDEMENT CHEST WASHOUT;  Chest wound Washout and Dressing Change;  Surgeon: Nalini Barreto MD;  [facility]     IRRIGATION AND DEBRIDEMENT CHEST WASHOUT, COMBINED N/A 5/25/2017    Procedure: COMBINED IRRIGATION AND DEBRIDEMENT CHEST WASHOUT;  Irrigation and Debridement Chest Washout and dressing change;  Surgeon: Nalini Barreto MD;  [facility]     IRRIGATION AND DEBRIDEMENT CHEST WASHOUT, COMBINED N/A 5/26/2017    Procedure: COMBINED IRRIGATION AND DEBRIDEMENT CHEST WASHOUT;  Irrigation and Debridement Chest Washout with  dressing change;  Surgeon: Nalini Barreto MD;  [facility]     IRRIGATION AND DEBRIDEMENT CHEST WASHOUT, COMBINED N/A 5/30/2017    Procedure: COMBINED IRRIGATION AND DEBRIDEMENT CHEST WASHOUT;  Irrigation and Debridement Chest Washout , PICC line dressing change;  Surgeon: Nalini Barreto MD;  [facility]     IRRIGATION AND DEBRIDEMENT CHEST WASHOUT, COMBINED N/A 5/31/2017    Procedure: COMBINED IRRIGATION AND DEBRIDEMENT CHEST WASHOUT;  Irrigation and Debridement Chest Washout ;  Surgeon: Nalini Barreto MD;  [facility]     IRRIGATION AND DEBRIDEMENT CHEST WASHOUT,  COMBINED N/A 6/1/2017    Procedure: COMBINED IRRIGATION AND DEBRIDEMENT CHEST WASHOUT;  Chest Wound Irrigation And Debridement with dressing change ;  Surgeon: Nalini Barreto MD;  [facility]     IRRIGATION AND DEBRIDEMENT CHEST WASHOUT, COMBINED N/A 6/4/2017    Procedure: COMBINED IRRIGATION AND DEBRIDEMENT CHEST WASHOUT;  COMBINED IRRIGATION AND DEBRIDEMENT CHEST WASHOUT, Esophagoscopy, Gastroscopy, Duodenoscopy (EGD) place transendoscopic esophageal stent,   Pharyngostomy and chest wall tube exchange  C-Arm;  Surgeon: Jens Wise MD;  [facility]     IRRIGATION AND DEBRIDEMENT CHEST WASHOUT, COMBINED N/A 6/2/2017    Procedure: COMBINED IRRIGATION AND DEBRIDEMENT CHEST WASHOUT;  Chest Wound Irrigation and Debridement, Dressing Change;  Surgeon: Nalini Barreto MD;  [facility]     LAPAROSCOPIC ASSISTED INSERTION TUBE JEJUNOSTOMY N/A 4/17/2017    Procedure: LAPAROSCOPIC ASSISTED INSERTION TUBE JEJUNOSTOMY;;  Surgeon: Jens Wise MD;  [facility]     LAPAROSCOPY DIAGNOSTIC (GENERAL) N/A 1/9/2017    Procedure: LAPAROSCOPY DIAGNOSTIC (GENERAL);  Surgeon: Jens Wise MD;  [facility]     LAPAROSCOPY DIAGNOSTIC (GENERAL) N/A 4/17/2017    Procedure: LAPAROSCOPY DIAGNOSTIC (GENERAL);  Laparoscopic , Neck Dissection, Spit Fistula Takedown, Laparoscopic Jejunostomy Tube and Pharyngostomy Tube, Gastric Pull up, Upper Endoscopy(EGD)  , Flexible Bronchoscopy ,Sternotomy ;  Surgeon: Jens Wise MD;  [facility]     LUMPECTOMY BREAST Right 2007     NERVE BLOCK PERIPHERAL N/A 8/30/2016    Procedure: NERVE BLOCK PERIPHERAL;  Surgeon: GENERIC ANESTHESIA PROVIDER;  [facility]     NISSEN FUNDOPLICATION  1/2016     PHARYNGOSTOMY N/A 4/18/2016    Procedure: PHARYNGOSTOMY;  Surgeon: Alexis Barraza MD;  [facility]     PHARYNGOSTOMY N/A 4/25/2016    Procedure: PHARYNGOSTOMY;  Surgeon: Alexis Barraza MD;  [facility]      "PHARYNGOSTOMY N/A 2016    Procedure: PHARYNGOSTOMY;  Surgeon: Alexis Barraza MD;  [facility]     PHARYNGOSTOMY N/A 2016    Procedure: PHARYNGOSTOMY;  Surgeon: Alexis Barraza MD;  [facility]     PHARYNGOSTOMY N/A 2016    Procedure: PHARYNGOSTOMY;  Surgeon: Jens Wise MD;  [facility]     PHARYNGOSTOMY N/A 2017    Procedure: PHARYNGOSTOMY;;  Surgeon: Jens Wise MD;  [facility]     PHARYNGOSTOMY Left 2017    Procedure: PHARYNGOSTOMY;;  Surgeon: Nalini Barreto MD;  [facility]     PICC INSERTION Left 2016    5fr DL BioFlo PICC, 42cm (3cm external) in the L medial brachial vein w/ tip in the SVC RA junction.     PICC INSERTION - Rewire Right 2017    5fr DL BioFlo PICC, 40cm (6cm external) in the R medial brachial vein w/ tip in the SVC RA junction.     REPAIR FISTULA TRACHEOESOPHAGEAL N/A 9/15/2017    Procedure: REPAIR FISTULA TRACHEOESOPHAGEAL;;  Surgeon: Jens Wise MD;  [facility]     THORACOTOMY Right     lung infection - \"hard crust formed on lung\"     THORACOTOMY Left 2017    Procedure: THORACOTOMY;  Surgeon: Jens Wise MD;  [facility]     WRIST SURGERY                Social History  Social History     Tobacco Use     Smoking status: Former Smoker     Packs/day: 1.00     Years: 15.00     Pack years: 15.00     Types: Cigarettes     Last attempt to quit: 1998     Years since quittin.7     Smokeless tobacco: Never Used   Substance Use Topics     Alcohol use: No     Alcohol/week: 0.0 standard drinks             Family History  Family History   Problem Relation Age of Onset     Alzheimer Disease Mother      Cerebrovascular Disease Father         cerebral hemorrhage     Ovarian Cancer Sister      Breast Cancer Daughter      Family history reviewed and updated in EPIC and not discussed          Allergies  Allergies   Allergen Reactions     Amoxicillin " Diarrhea     Ativan [Lorazepam] Other (See Comments)     Hallucinations     Morphine Itching             Medications  Current Facility-Administered Medications   Medication     acetaminophen (TYLENOL) tablet 650 mg     albuterol (PROAIR HFA/PROVENTIL HFA/VENTOLIN HFA) 108 (90 Base) MCG/ACT inhaler 1-2 puff     aspirin (ASA) chewable tablet 324 mg     atorvastatin (LIPITOR) tablet 20 mg     carboxymethylcellulose PF (REFRESH PLUS) 0.5 % ophthalmic solution 1 drop     celecoxib (celeBREX) capsule 100 mg     glucose gel 15-30 g    Or     dextrose 50 % injection 25-50 mL    Or     glucagon injection 1 mg     glucose gel 15-30 g    Or     dextrose 50 % injection 25-50 mL    Or     glucagon injection 1 mg     DOBUTamine 500 mg in dextrose 5% 250 mL (adult std conc) premix     folic acid (FOLVITE) tablet 1 mg     gabapentin (NEURONTIN) capsule 100 mg     gadobutrol (GADAVIST) injection 7.5 mL     heparin  drip 25,000 units in 0.45% NaCl 250 mL  ANTICOAGULANT  (see additional administration details for dose)     heparin ANTICOAGULANT Loading dose from infusion pump *Give when STARTING heparin infusion 2,950 Units     heparin bolus from infusion pump     HYDROmorphone (DILAUDID) injection 0.2 mg     ketotifen (ZADITOR) 0.025 % ophthalmic solution 1 drop     lactobacillus rhamnosus (GG) (CULTURELL) capsule 1 capsule     lidocaine (LMX4) cream     lidocaine (XYLOCAINE) 2 % 15 mL, alum & mag hydroxide-simethicone (MAALOX  ES) 15 mL GI Cocktail     lidocaine 1 % 0.1-1 mL     magnesium sulfate 4 g in 100 mL sterile water (premade)     melatonin tablet 3 mg     methocarbamol (ROBAXIN) tablet 500 mg     midodrine (PROAMATINE) tablet 5 mg     multivitamin, therapeutic (THERA-VIT) tablet 1 tablet     naloxone (NARCAN) injection 0.1-0.4 mg     naloxone (NARCAN) injection 0.1-0.4 mg     norepinephrine (LEVOPHED) 16 mg in  mL infusion     omeprazole (priLOSEC) CR capsule 20 mg     omeprazole (priLOSEC) CR capsule 20 mg      ondansetron (ZOFRAN-ODT) ODT tab 4 mg    Or     ondansetron (ZOFRAN) injection 4 mg     oxyCODONE (ROXICODONE) tablet 5 mg     Patient is already receiving anticoagulation with heparin, enoxaparin (LOVENOX), warfarin (COUMADIN)  or other anticoagulant medication     piperacillin-tazobactam (ZOSYN) 4.5 g vial to attach to  mL bag     prochlorperazine (COMPAZINE) injection 5 mg    Or     prochlorperazine (COMPAZINE) tablet 5 mg    Or     prochlorperazine (COMPAZINE) suppository 12.5 mg     sodium chloride (PF) 0.9% PF flush 3 mL     sodium chloride (PF) 0.9% PF flush 3 mL     sodium phosphate 15 mmol in D5W intermittent infusion     sodium phosphate 20 mmol in D5W intermittent infusion     sodium phosphate 25 mmol in D5W intermittent infusion     sucralfate (CARAFATE) tablet 1 g     thiamine (B-1) tablet 100 mg     [START ON 9/28/2020] vancomycin (VANCOCIN) 1000 mg in dextrose 5% 200 mL PREMIX     vancomycin 1250 mg in 0.9% NaCl 250 mL intermittent infusion 1,250 mg     venlafaxine (EFFEXOR-XR) 24 hr capsule 37.5 mg     vitamin D2 (ERGOCALCIFEROL) 43877 units (1250 mcg) capsule 50,000 Units             Vitals  Temp:  [95.7  F (35.4  C)-97.2  F (36.2  C)] 95.7  F (35.4  C)  Pulse:  [] 108  Resp:  [16-18] 18  BP: ()/(51-72) 94/59  SpO2:  [97 %-99 %] 98 %        Physical Exam    Gen: Elderly/frail adult female, supine in bed, fatigued but not distressed  HEENT:AT/ NC, PERRL b/l,  sclera anicteric  PULM/THORAX: Crackles bilaterally at the bases  CV: Tachy, regular, no NMRG appreciated  ABD: Soft, diffusely mild TTP, normal bowel sounds   EXT: Distal EXT cold, slightly grey, diffused pitting edema 2+  SKIN: Cool, bruising on left elbow        ROUTINE ICU LABS (Last four results)    CMP  Recent Labs   Lab 09/27/20  1336 09/25/20  0611 09/24/20  0534 09/23/20  0518 09/22/20  0712    144 142 141 142   POTASSIUM 5.4* 3.4 3.2* 3.3* 4.0   CHLORIDE 110* 112* 110* 108 108   CO2 19* 24 24 25 26   ANIONGAP 12  8 7 8 7   GLC 69* 60* 76 101* 71   BUN 4* 3* 3* 4* 4*   CR 0.60 0.60 0.55 0.56 0.64   GFRESTIMATED 89 89 >90 >90 87   GFRESTBLACK >90 >90 >90 >90 >90   ANNABELLE 8.0* 7.8* 7.5* 7.2* 7.4*   MAG  --  1.8 1.9 2.3 2.7*   PHOS  --  2.7 2.8 3.0 1.8*   PROTTOTAL 5.6* 4.6* 4.7* 4.9* 4.0*   ALBUMIN 2.1* 1.9* 1.9* 2.6* 1.1*   BILITOTAL 1.7* 1.5* 1.8* 1.5* 1.5*   ALKPHOS 189* 162* 164* 135 191*   AST Canceled, Test credited 36 32 25 37   ALT 19 17 19 18 19     CBC  Recent Labs   Lab 09/27/20  1336 09/25/20  0611 09/24/20  0534 09/23/20  0518   WBC 8.1 5.0 6.1 3.8*   RBC 2.73* 2.36* 2.37* 1.81*   HGB 10.4* 9.1* 9.1* 7.0*   HCT 33.7* 28.3* 29.0* 22.6*   * 120* 122* 125*   MCH 38.1* 38.6* 38.4* 38.7*   MCHC 30.9* 32.2 31.4* 31.0*   RDW 14.5 14.6 14.6 14.8   * 291 285 198     INR  Recent Labs   Lab 09/25/20  0611 09/22/20  0712   INR 1.67* 1.60*     Arterial Blood GasNo lab results found in last 7 days.          Imaging  CTA Chest abd pelvis:   1. No evidence for aortic dissection.  2. No intra-abdominal abscess.  3. New upper lobe predominant reticular and groundglass opacities.  Findings concerning for atypical infection.  4. Small bilateral pleural effusions.  5. Diffuse anasarca.  6. Hepatic steatosis.           Assessment and Plan  Minerva Blanco is a 75 year old female with PMH failure to thrive/malnutrition, EtOH use, CAD s/p stent in 0218, HFpEF, MS, breast cancer s/p lumpectomy with lymph node, meniere's disease, paroxysmal afib, GERD s/p multiple esophageal surgeries who presented from TCU and complained of dyspnea, hospital course complicated by multiple ICU transfers for hypotension.      Neurologic  # Hx depression:  - Continue PTA Effexor     Cardiovascular  # Suspected cardiogenic shock.   # Troponemia: ECG without acute ischemic changes, low voltage QRS.  Suspect trop elevated due to demand has been stable.  Could be component of sepsis but no clear source.  Also wonder about gut ischemia, but not noted on  CTA today.    - BNP markedly elevated  - Venous oxyhemoglobin borderline but this is after initiation of dobutamine and levo  - POCUS tonight to eval for effusion and EF               UPDATE: EF looks to be markedly depressed, suspect takotsubo cardiomyopathy  - Formal ECHO in the AM  - Cards consulted: agree with diuresis and dobutamine, pressors as needed    Pressors:  - Dobutamine  - Levo/vaso  - MAP goal 65  - holding midodrine while on drips     # Hx Paroxysmal atrial fibrillation:   - holding Metoprolol with hypotension    # Prolonged QTc: 533 on ICU admission  - CTM, avoid additional QT prolonging agents  - Maintain lytes K 4/Mg 2    Respiratory  #Dypsnea:   #Hx HFpEF:   #Possible aspiration pneumonia:  resolved   #Volume overload:   #New pulmonary opacities:   Patient endorses worsening shortness of breath on exertion over the last week PTA with weight gain, lower ext edema, and orthopnea.  CXR prior to admission at TCU with LLL consolidation. Patient was started on flagyl and ceftin on 9/17 and developed oxygen requirement. CXR on admission clear. COVID 19 negative. Patient completed 7 days of antibiotics total (ceftin/flagyl --> unasyn --> zosyn). Urine antigen strep pneumoniae and legionella negative.  TTE with moderate-severe tricuspid regurgitation, with pulmonary hypertension with PA pressure at least 37 mmHg. Obviously volume overloaded but concerning opacities on CT chest as well.    - Diuresis plan pending as above.   - ABX as below    Gastrointestinal  #Abdominal pain   New abd pain after eating breakfast. Progressively worsening, associated with nausea and worsening SOB. Endorses pain radiating through her neck, shoulder and L>R arms similar to prior MI. But no chest pain or pressure, abd pain has improved somewhat on MICU admission. Abd exam epigastric pain on palpation without rebound tenderness of peritoneal signs. LFTs at baseline. Diarrhea noted. Lipase WNL. KUB without obstructive gas  pattern. CXR with bilateral pleural effusions.  -Follow up Cdiff  -CTA chest abd pelvis without explanatory findings    # Elevated ALP:  # Mixed bilirubin elevation:   # LFT abnormalities:  # Hepatitc steatosis, with probable early cirrhosis:   Chronically elevated Alk phos since 2017. Total bilirubin and AST/ALT mildly elevated since 8/2020. INR elevated.  Patient states she drinks daily, which per family is under reported. MRCP with hepatic steatosis with cirrhotic morphology, also showing chronic CBD dilation to 12mm without any obstruction or mass unchanged from 2016. High suspicion for underlying cirrhosis. Small amount of ascites on imaging, denies any abd pain, fevers, or leukocytosis to suggest SBP. No signs of HE on exam. Platelet normal.  Hepatology consulted, and felt that patient has advanced liver disease with steatosis, with possible early cirrhosis, and recommended alcohol cessation and improved nutrition.   -Hepatology consulted earlier this stay  -Trend CMP and INR     Infectious Disease  # Concern for infection: Most likely PNA, with infiltrates on CXR, although under whelming for infection otherwise on exam and workup.    - Continue ABX for now    Antibiotics:  Vanc resumed prior to transfer 9/27  Zosyn resumed prior to transfer 9/27  Added doxycycline given possible appearance of atypical infection on CT chest    Cultures:  Follow up pan culture + Cdiff     Hematology  # Macrocytic anemia: B12 elevated (likely related to liver disease) and haptocorin dysregulation.  Folate wnl. Smear done, no mention MDS.  TSH elevated but normal FT4  - Defer further workup in acute setting.      # Coagulopathy: Likely related to liver disease, not currently bleeding.  Possible early DIC with increasing INR and falling fibrinogen.    - CTM    Endocrine  No acute issues    Renal/Electolytes/FEN  # HAGMA:   - Pressors and ABX as above  - Follow closely     FILTER. Baseline Cr: at baseline (BUN very low likely  related to malnutrition).     VOLUME STATUS: Hypervolemic    Skin Care  Per nursing    PPX:   - VTE: Stoped heparin drip will give ppx dosing  - GI: NA  FEN: NPO  Consults: Cards consulted  Tubes/Lines/Drains: R femoral ART line, R IJ    CODE: DNR/DNI, consider discussion with palliative in the AM  Family: Updated by phone with findings of echo and plan for the night  Dispo: ICU pending course     Patient discussed with staff attending, Dr. Laureano.  Please feel free to page with questions.    Rio Zarco MD    Internal Medicine PGY-3  Pager: [phone]

## 2020-09-28 NOTE — PROCEDURES
Procedure: Central venous Catheter placement  Indication: shock  Consent not obtained due to emergent nature    Skin prepped and patient draped  Skin infiltrated with 3 mL of 1% lidocaine  Using ultrasound guidance, introducer needle inserted into right internal jugular on 2nd attempt. Wire was placed and confirmed in vein with ultrasound.  Skin dilated and triple lumen catheter inserted and secured at 15 cm. No complications. CXR with line in good position.     Colten Laureano MD

## 2020-09-28 NOTE — PROCEDURES
Patient in the supine position.   Skin prepped with chlorhexidine  3 mL of 1% lidocaine infiltrated into the skin.  Using ultrasound guidance, introducer needle inserted into right femoral artery on 2nd attempt.  Wire threaded easily.  Catheter inserted and normal waveform noted on the monitor.  Sutured in place and tagaderm dressing applied.     Colten Laureano MD

## 2020-09-28 NOTE — PLAN OF CARE
Edema 4A: Cancel, patient with change in medical status requiring higher level of care. Will hold today and re-initiate as medically appropriate.

## 2020-09-28 NOTE — CONSULTS
[facility]  Palliative Care Consultation Note    Patient: Minerva Blanco  Date of Admission:  9/21/2020    Requesting Clinician / Team: MICU  Reason for consult: Symptom management  Goals of care    Recommendations:    She appears to be imminently dying and death is reasonably expected today. By my exam, and per nurse, she appears to be comfortable.     Plan is to transition to 'comfort-only cares' soon this afternoon now that her dtr is here. Would give a dose of dilaudid prior to stopping pressors and liberalize dosing to 0.2-0.4mg IV dilaudid q30 min prn signs of discomfort    Rev Katina is visiting soon this afternoon for spiritual care    Along with MICU resident we discussed with family patient is very likely to die today, but cautioned them we are wrong sometimes and it is possible the patient lingers for a day or 2. If she does seem to linger it may be best to transfer her to a private floor room out of the ICU    We will follow , call with anything     These recommendations have been discussed with MICU team, RN.      Thank you for the opportunity to participate in the care of this patient and family. Our team: will continue to follow.     During regular M-F work hours -- if you are not sure who specifically to contact -- please contact us by sending a text page to our team consult pager at [phone].    After regular work hours and on weekends/holidays, you can call our answering service at [phone]. Also, who's on call for us is available in [facility] [employer].       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 Today her daughters are interested in transitioning to 'comfort cares.'    Today, the patient was seen for:  Aortic dissection  shock    Prognosis, Goals, & Planning:      Functional Status just prior to hospitalization: Unable to determine      Prognosis, Goals, and/or Advance Care Planning were addressed today: Yes        Summary/Comments:       Patient's decision making preferences: unable to assess          Patient has decision-making capacity today for complex decisions: No            I have concerns about the patient/family's health literacy today: No           Patient has a completed Health Care Directive: Yes, and on file.    Legally designated health care agent: Richard spouse who I think is dead; Chandan her dtr is her back up agent      Code status: No CPR / No Intubation    Coping, Meaning, & Spirituality:   Mood, coping, and/or meaning in the context of serious illness were addressed today: Yes  Summary/Comments:     Social:     Living situation: had been at a TCU    History of Present Illness:  History gathered today from: family/loved ones, medical chart, medical team members, unit team members, health care directive/s    Long hospital stay, reviewed in chart and summarized above.    Overnight she decompensated; thought perhaps septic, CTA shows aortic dissection involving celiac artery which is probably the cause of her shock and rising lactate.     Her dtr was here last night and per nurse said goodbye to the patient and does not plan on returning in person. Another dtr is driving in from [place].    Per RN patient is comfortable, has only needed one dose of 0.2 mg dilaudid. Having irregular resps with apneic periods      Later entry: dtr from [place] is here. I participated in a goals discussion with patient's 2 dtrs, and MICU resident. They report understanding the patient is dying and are quite clear she would not want to be prolonged  like this. We discussed deescalating measures and stopping pressors, etc; counseled abut comfort treatments, prognosis, as above. Our  is visiting with patient and dtr soon.       ROS:  uinable to obtain     Past Medical History:  Past Medical History:   Diagnosis Date     Breast cancer (H) 2007    right side - lumpectomy, chemo, and local radiation     Chronic infection     infection/wound for two years after esoph surgery     Compression fracture of spine (H)     T12-L1     Coronary artery disease 04/2018    s/p PCI with ADOLFO to proximal and mid RCA     Esophageal perforation      Fibromyalgia      GERD (gastroesophageal reflux disease)      Hiatal hernia      IBS (irritable bowel syndrome)      Meniere's disease     deaf left ear     Multiple sclerosis (H)      Noninfectious ileitis      Other chronic pain      Paroxysmal atrial fibrillation (H)         Past Surgical History:  Past Surgical History:   Procedure Laterality Date     APPENDECTOMY       BACK SURGERY  5/1/2015    lumbar fusion     BRONCHOSCOPY FLEXIBLE N/A 4/17/2017    Procedure: BRONCHOSCOPY FLEXIBLE;;  Surgeon: Jens Wise MD;  [facility]     C GASTROSTOMY/JEJUN TUBE      J tube for feedings     CLOSE SPIT FISTULA N/A 4/17/2017    Procedure: CLOSE SPIT FISTULA;;  Surgeon: Jens Wise MD;  [facility]     COMBINED ESOPHAGOSCOPY, GASTROSCOPY, DUODENOSCOPY (EGD), REMOVE ESOPHAGEAL STENT N/A 8/26/2016    Procedure: COMBINED ESOPHAGOSCOPY, GASTROSCOPY, DUODENOSCOPY (EGD), REMOVE ESOPHAGEAL STENT;  Surgeon: Jens Wise MD;  [facility]     COMBINED ESOPHAGOSCOPY, GASTROSCOPY, DUODENOSCOPY (EGD), REMOVE ESOPHAGEAL STENT N/A 2/12/2018    Procedure: COMBINED ESOPHAGOSCOPY, GASTROSCOPY, DUODENOSCOPY (EGD), REMOVE ESOPHAGEAL STENT;  Esophagogastroduodenoscopy With Stent Removal;  Surgeon: Jens Wise MD;  [facility]     COMBINED ESOPHAGOSCOPY, GASTROSCOPY, DUODENOSCOPY (EGD),  REPLACE ESOPHAGEAL STENT N/A 8/25/2017    Procedure: COMBINED ESOPHAGOSCOPY, GASTROSCOPY, DUODENOSCOPY (EGD), REPLACE ESOPHAGEAL STENT;;  Surgeon: Jens Wise MD;  [facility]     COMBINED ESOPHAGOSCOPY, GASTROSCOPY, DUODENOSCOPY (EGD), REPLACE ESOPHAGEAL STENT N/A 9/15/2017    Procedure: COMBINED ESOPHAGOSCOPY, GASTROSCOPY, DUODENOSCOPY (EGD), REPLACE ESOPHAGEAL STENT;  Upper Endoscopy with Stent Exchange, Cauterization of Tracheosophageal Fistula, Cauterization of Chest Wound   ;  Surgeon: Jens Wise MD;  [facility]     COMBINED ESOPHAGOSCOPY, GASTROSCOPY, DUODENOSCOPY (EGD), REPLACE ESOPHAGEAL STENT N/A 10/20/2017    Procedure: COMBINED ESOPHAGOSCOPY, GASTROSCOPY, DUODENOSCOPY (EGD), REPLACE ESOPHAGEAL STENT;  Upper Endoscopy with Stent Exchange, Cauterization Tracheosphageal Fistula Tract;  Surgeon: Nalini Barreto MD;  [facility]     COMBINED ESOPHAGOSCOPY, GASTROSCOPY, DUODENOSCOPY (EGD), REPLACE ESOPHAGEAL STENT N/A 11/3/2017    Procedure: COMBINED ESOPHAGOSCOPY, GASTROSCOPY, DUODENOSCOPY (EGD), REPLACE ESOPHAGEAL STENT;  Esophagogastroduodenoscopy, Stent Revision, Cauterization Of Traceoesophageal Fistula and stent exchange;  Surgeon: Nalini Barreto MD;  [facility]     COMBINED ESOPHAGOSCOPY, GASTROSCOPY, DUODENOSCOPY (EGD), REPLACE ESOPHAGEAL STENT N/A 11/17/2017    Procedure: COMBINED ESOPHAGOSCOPY, GASTROSCOPY, DUODENOSCOPY (EGD), REPLACE ESOPHAGEAL STENT;  Esophagogastroduodenoscopy, Esophogeal Stent Removal, Esophogeal Stent Placement (23x100 EndoMAXX), Cauterization Of Fistula Tract;  Surgeon: Nalini Barreto MD;  [facility]     CREATE SPIT FISTULA N/A 1/9/2017    Procedure: CREATE SPIT FISTULA;  Surgeon: Jens Wise MD;  [facility]     ESOPHAGECTOMY N/A 1/9/2017    Procedure: ESOPHAGECTOMY;  Surgeon: Jens Wise MD;  [facility]     ESOPHAGOSCOPY, GASTROSCOPY, DUODENOSCOPY (EGD), COMBINED N/A 4/18/2016     Procedure: COMBINED ESOPHAGOSCOPY, GASTROSCOPY, DUODENOSCOPY (EGD);  Surgeon: Alexis Barraza MD;  [facility]     ESOPHAGOSCOPY, GASTROSCOPY, DUODENOSCOPY (EGD), COMBINED N/A 4/25/2016    Procedure: COMBINED ESOPHAGOSCOPY, GASTROSCOPY, DUODENOSCOPY (EGD);  Surgeon: Alexis Barraza MD;  [facility]     ESOPHAGOSCOPY, GASTROSCOPY, DUODENOSCOPY (EGD), COMBINED N/A 5/4/2016    Procedure: COMBINED ESOPHAGOSCOPY, GASTROSCOPY, DUODENOSCOPY (EGD);  Surgeon: Alexis Barraza MD;  [facility]     ESOPHAGOSCOPY, GASTROSCOPY, DUODENOSCOPY (EGD), COMBINED N/A 5/18/2016    Procedure: COMBINED ESOPHAGOSCOPY, GASTROSCOPY, DUODENOSCOPY (EGD);  Surgeon: Alexis Barraza MD;  [facility]     ESOPHAGOSCOPY, GASTROSCOPY, DUODENOSCOPY (EGD), COMBINED N/A 6/22/2016    Procedure: COMBINED ESOPHAGOSCOPY, GASTROSCOPY, DUODENOSCOPY (EGD);  Surgeon: Alexis Barraza MD;  [facility]     ESOPHAGOSCOPY, GASTROSCOPY, DUODENOSCOPY (EGD), COMBINED N/A 7/12/2016    Procedure: COMBINED ESOPHAGOSCOPY, GASTROSCOPY, DUODENOSCOPY (EGD);  Surgeon: Jens Wise MD;  [facility]     ESOPHAGOSCOPY, GASTROSCOPY, DUODENOSCOPY (EGD), COMBINED N/A 4/21/2017    Procedure: COMBINED ESOPHAGOSCOPY, GASTROSCOPY, DUODENOSCOPY (EGD);  Esophagogastroduodenoscopy, Pharyngostomy Tube Placement ;  Surgeon: Jens Wise MD;  [facility]     ESOPHAGOSCOPY, GASTROSCOPY, DUODENOSCOPY (EGD), COMBINED N/A 5/19/2017    Procedure: COMBINED ESOPHAGOSCOPY, GASTROSCOPY, DUODENOSCOPY (EGD);  Upper Endoscopy, Irrigation and Debridement of Chest Wound ;  Surgeon: Nalini Barreto MD;  [facility]     ESOPHAGOSCOPY, GASTROSCOPY, DUODENOSCOPY (EGD), COMBINED N/A 5/23/2017    Procedure: COMBINED ESOPHAGOSCOPY, GASTROSCOPY, DUODENOSCOPY (EGD);;  Surgeon: Nalini Barreto MD;  [facility]     ESOPHAGOSCOPY, GASTROSCOPY, DUODENOSCOPY (EGD), COMBINED N/A 6/27/2017    Procedure:  COMBINED ESOPHAGOSCOPY, GASTROSCOPY, DUODENOSCOPY (EGD);  Esophagogastroduodenoscopy With Removal And Replacement Of Esophageal Stent exchange. Exchange of pharyngtomy tube. Chest wound dressing change;  Surgeon: Nalini Barreto MD;  [facility]     ESOPHAGOSCOPY, GASTROSCOPY, DUODENOSCOPY (EGD), COMBINED N/A 8/4/2017    Procedure: COMBINED ESOPHAGOSCOPY, GASTROSCOPY, DUODENOSCOPY (EGD);  Esophagogastroduodenoscopy, Stent Removal and Stent Replacement. Dressing Change ;  Surgeon: Nalini Barreto MD;  [facility]     ESOPHAGOSCOPY, GASTROSCOPY, DUODENOSCOPY (EGD), COMBINED N/A 8/25/2017    Procedure: COMBINED ESOPHAGOSCOPY, GASTROSCOPY, DUODENOSCOPY (EGD);  Esophagogastroduodenoscopy,  Stent Revision,  Cauterization;  Surgeon: Jens Wise MD;  [facility]     ESOPHAGOSCOPY, GASTROSCOPY, DUODENOSCOPY (EGD), COMBINED N/A 10/2/2017    Procedure: COMBINED ESOPHAGOSCOPY, GASTROSCOPY, DUODENOSCOPY (EGD);  Esophagogastroduodenostomy, Replacement of Esophageal Stent, Cauterization of Tracheosophageal Fistula anc chest wall,   C-arm;  Surgeon: Naliin Barreto MD;  [facility]     ESOPHAGOSCOPY, GASTROSCOPY, DUODENOSCOPY (EGD), DILATATION, COMBINED N/A 6/29/2016    Procedure: COMBINED ESOPHAGOSCOPY, GASTROSCOPY, DUODENOSCOPY (EGD), DILATATION;  Surgeon: Jens Wise MD;  [facility]     EXPLORE NECK N/A 5/19/2017    Procedure: EXPLORE NECK;;  Surgeon: Nalini Barreto MD;  [facility]     HC UGI ENDOSCOPY W TRANSENDOSCOPIC STENT PLACEMENT N/A 7/12/2016    Procedure: COMBINED ESOPHAGOSCOPY, GASTROSCOPY, DUODENOSCOPY (EGD), PLACE TRANSENDOSCOPIC ESOPHAGEAL STENT;  Surgeon: Jens Wise MD;  [facility]     HC UGI ENDOSCOPY W TRANSENDOSCOPIC STENT PLACEMENT N/A 7/22/2016    Procedure: COMBINED ESOPHAGOSCOPY, GASTROSCOPY, DUODENOSCOPY (EGD), PLACE TRANSENDOSCOPIC ESOPHAGEAL STENT;  Surgeon: Jens Wise MD;  [facility]     INCISION AND DRAINAGE  CHEST WASHOUT, COMBINED N/A 5/27/2017    Procedure: COMBINED INCISION AND DRAINAGE CHEST WASHOUT;  Chest washout;  Surgeon: Nalini Barreto MD;  [facility]     INCISION AND DRAINAGE CHEST WASHOUT, COMBINED N/A 5/28/2017    Procedure: COMBINED INCISION AND DRAINAGE CHEST WASHOUT;  Chest washout;  Surgeon: Nalini Barreto MD;  [facility]     INCISION AND DRAINAGE CHEST WASHOUT, COMBINED N/A 6/9/2017    Procedure: COMBINED INCISION AND DRAINAGE CHEST WASHOUT;  Chest washout and dressing change, Esophaogastroduodenoscopy;  Surgeon: Jens Wise MD;  [facility]     INCISION AND DRAINAGE CHEST WASHOUT, COMBINED N/A 7/17/2017    Procedure: COMBINED INCISION AND DRAINAGE CHEST WASHOUT;  Incision and Drainage of right Chest Wound, Esophagogastroduodenoscopy with stent exchange. pharangostomy tube revision;  Surgeon: Jens Wise MD;  [facility]     IRRIGATION AND DEBRIDEMENT CHEST WASHOUT, COMBINED N/A 6/29/2016    Procedure: COMBINED IRRIGATION AND DEBRIDEMENT CHEST WASHOUT;  Surgeon: Jens Wise MD;  [facility]     IRRIGATION AND DEBRIDEMENT CHEST WASHOUT, COMBINED N/A 5/23/2017    Procedure: COMBINED IRRIGATION AND DEBRIDEMENT CHEST WASHOUT;  COMBINED IRRIGATION AND DEBRIDEMENT CHEST WASHOUT,COMBINED ESOPHAGOSCOPY, GASTROSCOPY, DUODENOSCOPY (EGD) ;  Surgeon: Nalini Barreto MD;  [facility]     IRRIGATION AND DEBRIDEMENT CHEST WASHOUT, COMBINED N/A 5/24/2017    Procedure: COMBINED IRRIGATION AND DEBRIDEMENT CHEST WASHOUT;  Chest wound Washout and Dressing Change;  Surgeon: Nalini Barreto MD;  [facility]     IRRIGATION AND DEBRIDEMENT CHEST WASHOUT, COMBINED N/A 5/25/2017    Procedure: COMBINED IRRIGATION AND DEBRIDEMENT CHEST WASHOUT;  Irrigation and Debridement Chest Washout and dressing change;  Surgeon: Nalini Barreto MD;  [facility]     IRRIGATION AND DEBRIDEMENT CHEST WASHOUT, COMBINED N/A 5/26/2017    Procedure: COMBINED IRRIGATION AND  DEBRIDEMENT CHEST WASHOUT;  Irrigation and Debridement Chest Washout with  dressing change;  Surgeon: Nalini Barreto MD;  [facility]     IRRIGATION AND DEBRIDEMENT CHEST WASHOUT, COMBINED N/A 5/30/2017    Procedure: COMBINED IRRIGATION AND DEBRIDEMENT CHEST WASHOUT;  Irrigation and Debridement Chest Washout , PICC line dressing change;  Surgeon: Nalini Barreto MD;  [facility]     IRRIGATION AND DEBRIDEMENT CHEST WASHOUT, COMBINED N/A 5/31/2017    Procedure: COMBINED IRRIGATION AND DEBRIDEMENT CHEST WASHOUT;  Irrigation and Debridement Chest Washout ;  Surgeon: Nalini Barreto MD;  [facility]     IRRIGATION AND DEBRIDEMENT CHEST WASHOUT, COMBINED N/A 6/1/2017    Procedure: COMBINED IRRIGATION AND DEBRIDEMENT CHEST WASHOUT;  Chest Wound Irrigation And Debridement with dressing change ;  Surgeon: Nalini Barreto MD;  [facility]     IRRIGATION AND DEBRIDEMENT CHEST WASHOUT, COMBINED N/A 6/4/2017    Procedure: COMBINED IRRIGATION AND DEBRIDEMENT CHEST WASHOUT;  COMBINED IRRIGATION AND DEBRIDEMENT CHEST WASHOUT, Esophagoscopy, Gastroscopy, Duodenoscopy (EGD) place transendoscopic esophageal stent,   Pharyngostomy and chest wall tube exchange  C-Arm;  Surgeon: Jens Wise MD;  [facility]     IRRIGATION AND DEBRIDEMENT CHEST WASHOUT, COMBINED N/A 6/2/2017    Procedure: COMBINED IRRIGATION AND DEBRIDEMENT CHEST WASHOUT;  Chest Wound Irrigation and Debridement, Dressing Change;  Surgeon: Nalini Barreto MD;  [facility]     LAPAROSCOPIC ASSISTED INSERTION TUBE JEJUNOSTOMY N/A 4/17/2017    Procedure: LAPAROSCOPIC ASSISTED INSERTION TUBE JEJUNOSTOMY;;  Surgeon: Jens Wise MD;  [facility]     LAPAROSCOPY DIAGNOSTIC (GENERAL) N/A 1/9/2017    Procedure: LAPAROSCOPY DIAGNOSTIC (GENERAL);  Surgeon: Jens Wise MD;  [facility]     LAPAROSCOPY DIAGNOSTIC (GENERAL) N/A 4/17/2017    Procedure: LAPAROSCOPY DIAGNOSTIC (GENERAL);  Laparoscopic , Neck  "Dissection, Spit Fistula Takedown, Laparoscopic Jejunostomy Tube and Pharyngostomy Tube, Gastric Pull up, Upper Endoscopy(EGD)  , Flexible Bronchoscopy ,Sternotomy ;  Surgeon: Jens Wise MD;  [facility]     LUMPECTOMY BREAST Right 2007     NERVE BLOCK PERIPHERAL N/A 8/30/2016    Procedure: NERVE BLOCK PERIPHERAL;  Surgeon: GENERIC ANESTHESIA PROVIDER;  [facility]     NISSEN FUNDOPLICATION  1/2016     PHARYNGOSTOMY N/A 4/18/2016    Procedure: PHARYNGOSTOMY;  Surgeon: Alexis Barraza MD;  [facility]     PHARYNGOSTOMY N/A 4/25/2016    Procedure: PHARYNGOSTOMY;  Surgeon: Alexis Barraza MD;  [facility]     PHARYNGOSTOMY N/A 5/4/2016    Procedure: PHARYNGOSTOMY;  Surgeon: Alexis Barraza MD;  [facility]     PHARYNGOSTOMY N/A 6/22/2016    Procedure: PHARYNGOSTOMY;  Surgeon: Alexis Barraza MD;  [facility]     PHARYNGOSTOMY N/A 6/29/2016    Procedure: PHARYNGOSTOMY;  Surgeon: Jens Wise MD;  [facility]     PHARYNGOSTOMY N/A 4/21/2017    Procedure: PHARYNGOSTOMY;;  Surgeon: Jens Wise MD;  [facility]     PHARYNGOSTOMY Left 5/31/2017    Procedure: PHARYNGOSTOMY;;  Surgeon: Nalini Barreto MD;  [facility]     PICC INSERTION Left 8/25/2016    5fr DL BioFlo PICC, 42cm (3cm external) in the L medial brachial vein w/ tip in the SVC RA junction.     PICC INSERTION - Rewire Right 05/31/2017    5fr DL BioFlo PICC, 40cm (6cm external) in the R medial brachial vein w/ tip in the SVC RA junction.     REPAIR FISTULA TRACHEOESOPHAGEAL N/A 9/15/2017    Procedure: REPAIR FISTULA TRACHEOESOPHAGEAL;;  Surgeon: Jens Wise MD;  [facility]     THORACOTOMY Right 1998    lung infection - \"hard crust formed on lung\"     THORACOTOMY Left 1/9/2017    Procedure: THORACOTOMY;  Surgeon: Jens Wise MD;  [facility]     WRIST SURGERY           Family History:  Family History   Problem " Relation Age of Onset     Alzheimer Disease Mother      Cerebrovascular Disease Father         cerebral hemorrhage     Ovarian Cancer Sister      Breast Cancer Daughter          Allergies:  Allergies   Allergen Reactions     Amoxicillin Diarrhea     Ativan [Lorazepam] Other (See Comments)     Hallucinations     Morphine Itching        Medications:  I have reviewed this patient's medication profile and medications from this hospitalization.   Noted:  Dilaudid 0.2mg iv prn    NE dobutamine and  drips      Physical Exam:  Vital Signs: Temp: 96.8  F (36  C) Temp src: Axillary BP: (!) 112/30 Pulse: 104   Resp: (!) 7 SpO2: (!) 86 % O2 Device: Oxymask Oxygen Delivery: 5 LPM  Weight: 107 lbs 14.4 oz  Lethargic, NAD, opens eyes slightly to voice/light touch; no response otherwise  Head NCAT.  Eyes anicteric without injection  Face symmetric, eyes conjugate  Mouth pink, moist, no lesions. Speech fluent.  Neck supple without masses, thyromegaly, LAD  Lungs irregular, shallow, no crackles  CV rrr systolic murmur  Abd soft, ntnd, benign  Back well aligned, no masses, tenderness  bilat le wrapped  Skin very pale, lips blue  MSK joints of hand normal;  Neuro face symmetric unresponsive  Neuropsych unresponsive      Data reviewed:  Recent imaging reviewed, my comments on pertinents:   Echo  Interpretation Summary  LVEF 21% based on biplane 2D tracing.  Normal contraction of basal segments with hypokinesis to akinesis of mid and  distal segments,typical for stress cardiomyopathy if no CAD.  Global right ventricular function is moderately reduced.  The inferior vena cava cannot be assessed.  No pericardial effusion is present.    CT images personally reviewed today; nothing to add to rads read:  IMPRESSION:  1. Focal aortic dissection above the celiac artery extends through the  celiac origin to the celiac bifurcation. Common hepatic artery  originates from one lumen and the splenic artery originates from the  other. It is  difficult to discern which is the true and which is a  false lumen.     2. Splenic artery cannot be traced to the splenic hilum. The spleen is  poorly enhancing. Whether this is due to bolus timing or ischemia  cannot be determined.     3. 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     4. No intra-abdominal abscess.     5. New upper lobe predominant reticular and groundglass opacities.  Findings concerning for atypical infection.     6. Small bilateral pleural effusions.     7. Diffuse anasarca.     8. Hepatic steatosis.       Recent lab data reviewed, my comments on pertinents:   Lactate now 18 and rising  Trop elevated  Cr 0.7  Bili 2  Na 150

## 2020-09-28 NOTE — PLAN OF CARE
Physical Therapy Discharge Summary    Reason for therapy discharge:    Change in medical status. Pt and family has decided to transition to comfort cares.     Progress towards therapy goal(s). See goals on Care Plan in [facility] electronic health record for goal details.  Goals partially met.  Barriers to achieving goals:   medical status.    Therapy recommendation(s):    No further therapy is recommended.

## 2020-09-28 NOTE — PLAN OF CARE
OT 4A: Cancel.  Pt not medically appropriate for therapy today. Transferred to higher level of care with hypotension, now on 3 pressors, is DNR/DNI.  Per RN and medical chart plan to discuss goals of care with family.  Will await outcomes of the discussion.

## 2020-09-28 NOTE — PROVIDER NOTIFICATION
09/27/20 1900   Call Information   Date of Call 09/27/20   Time of Call 1751   Name of person requesting the team Asiya JIMENEZ   Title of person requesting team RN   RRT Arrival time 1753   Time RRT ended 1904   Reason for call   Type of RRT Adult   Primary reason for call Sepsis suspected;Cardiovascular   Cardiovascular BP undetectable;With symptoms;Other (describe)  (Elevated troponins)   Sepsis Suspected Elevated Lactate level;SPB <90 or MAP 40mmHG   SBAR   Situation LA 9.4. BPs undetectable in CT   Background PMHx of CAD s/p ADOLFO 2018, chronic HFpEF, pAfib not on anticoagulation, MS, breast cancer s/p lumpectomy and lymph node resection, GERD s/p multiple esophageal surgeries with multiple recent hospitalization due to weakness, falls, malnutrition and failure to thrive, who presents with worsening OSORIO for the last week from TCU, admitted on 9/21 for acute hypoxic respiratory failure, concerning for aspiration pneumonia.    Notable History/Conditions Cancer;Cardiac   Assessment Pt is lethargic, able to wake and respond to questions appropriately. She is pale and cold to the touch on all extremities. Blood pressures are undetectable on the L forearm and R leg in CT. Able to palpate a radial pulse, it is weak. O2 sats stable. Midline in place. LA of 9.4, K 5.4, and positive troponin's.    Interventions Fluid bolus;Meds;O2 per N/C or mask   Adjustments to Recommend Norepinephrine and Dobutamine started along with a fluid bolus   Patient Outcome   Patient Outcome Transferred to  (4A (MICU service))   RRT Team   Date Attending Physician notified 09/27/20   Time Attending Physician notified 1751   Physician(s) Trae Fleming CNP   Lead RN Shannon Kohli

## 2020-09-28 NOTE — PROGRESS NOTES
Report was given to 4A nurse. This writer attempted to give report to pt's daughter, Chandan but daughter was currently on her way up from hospital lobby and will get update when she arrives at pt's current location.

## 2020-09-28 NOTE — CONSULTS
[facility], [place]    Cardiology Consult Note-Cards 2      Date of Admission:  9/21/2020  Consult Requested by: MICU  Reason for Consult: Shock    Assessment & Plan: HVSL   Minerva Blanco is a 75 year old female admitted on 9/21/2020.     The patient is a 75-year-old female with a past medical history notable for multiple sclerosis, history of breast cancer status post resection, esophageal stricture, coronary artery disease with a previous inferior myocardial infarction status post PCI to the RCA in 2018, heart failure with preserved ejection, and multiple recent admissions for failure to thrive with whom cardiology is consulted for shock.    Shock, mixed cardiogenic and ?septic  Lactic acidosis  Acute heart failure with reduced ejection fraction likely 2/2 stress induced cardiomyopathy  Severe TR  Acute myocardial injury 2/2 acute heart failure  CAD s/p inferior MI s/p ADOLFO to pRCA and mRCA  H/o HFpEF  The patient shock is likely mixed in the setting of cardiogenic shock and a possible septic shock component.  Though the patient's ejection fraction has significantly reduced, the normal cardiac index on a low-dose of dobutamine is discrepant with pure cardiogenic shock causing a LA of 11.4. Possible etiologies of a septic component could be an atypical lung infection, as described on her CT scan from today.  The etiology of her cardiomyopathy is likely to be due to stress-induced cardiomyopathy (takotsubo's).  Low suspicion for acute infarct as the EKG shows no ischemic changes and the pattern of wall motion abnormality is consistent with stress-induced.  - Agree with norepinephrine for vasopressor support  - Agree with dobutamine at 5 mcg/kg/min, would decrease to 2.5 mcg/kg/min if next oxyhemoglobin is stable  - Agree with diuresis for goal net -1 to 2 L/day.  She is diuresing well after receiving 20 mg of IV Lasix  - Trend oxyhemoglobin from central line every 4 hours  - Would  aggressively treat patient's acidosis as likely to be driving hypotension at this time  - Formal echocardiogram in the morning  - At this time, urgent right heart cath does not need to be pursued as it is unlikely to   - As noted above, low suspicion for acute myocardial infarction, therefore unlikely to pursue coronary angiogram  - Treat possible sources of underlying infection include atypical pneumonia     The patient's care was discussed with the Primary team and staff attending Dr. Urbina.    Cornelio Chinchilla MD  [facility], [place]  Pager: [phone]  _____________________________________________________________    Chief Complaint   Shock    Unable to obtain a history from the patient due to confusion    History of Present Illness   Minerva Blanco is a 75 year old with a past medical history notable for multiple sclerosis, history of breast cancer status post resection, esophageal stricture, coronary artery disease with a previous inferior myocardial infarction status post PCI to the RCA in 2018, heart failure with preserved ejection, and multiple recent admissions for failure to thrive with whom cardiology is consulted for shock.    The patient presented to the hospital nearly 1 week ago with ongoing shortness of breath.  She was treated as a possible aspiration pneumonia or heart failure exacerbation.  The patient triggered the sepsis screening today which revealed a lactate of 9.4.  Due to this, the patient underwent a CT scan evaluating for possible ischemia and was admitted to the ICU for further evaluation.  After arriving in the ICU, the patient required vasopressors to maintain a map greater than 65.  She was slightly hypoxic and was encephalopathic.  Bedside echocardiogram was concerning for a low ejection fraction and therefore cardiology was consulted.    My assessment, the patient is confused and unable to provide a meaningful history.  Echocardiogram was  performed at the bedside and was notable for severely reduced biventricular function.  LVEF is likely 10 to 15%.  She has basal hypokinesis and apical and mid segment akinesis which is consistent with stress-induced cardiomyopathy.  Severe TR was also noted.  Oxyhemoglobin was checked from the patient's central line which showed a cardiac index of 2.5 on dobutamine.    In regards to the patient's cardiac history, she had an inferior STEMI and underwent 2 ADOLFO to the proximal and mid RCA in 2018.  She has been followed for heart failure with preserved ejection fraction and has been hospitalized in the past for volume overload.    She is currently a DNR/DNI and has been cared for in transitional care units for months for failure to thrive.    Review of Systems   Review of systems not obtained due to patient factors - confusion    Past Medical History    I have reviewed this patient's medical history and updated it with pertinent information if needed.   Past Medical History:   Diagnosis Date     Breast cancer (H) 2007    right side - lumpectomy, chemo, and local radiation     Chronic infection     infection/wound for two years after esoph surgery     Compression fracture of spine (H)     T12-L1     Coronary artery disease 04/2018    s/p PCI with ADOLFO to proximal and mid RCA     Esophageal perforation      Fibromyalgia      GERD (gastroesophageal reflux disease)      Hiatal hernia      IBS (irritable bowel syndrome)      Meniere's disease     deaf left ear     Multiple sclerosis (H)      Noninfectious ileitis      Other chronic pain      Paroxysmal atrial fibrillation (H)        Past Surgical History   I have reviewed this patient's surgical history and updated it with pertinent information if needed.  Past Surgical History:   Procedure Laterality Date     APPENDECTOMY       BACK SURGERY  5/1/2015    lumbar fusion     BRONCHOSCOPY FLEXIBLE N/A 4/17/2017    Procedure: BRONCHOSCOPY FLEXIBLE;;  Surgeon: Jens Wise  MD Kg;  [facility]     C GASTROSTOMY/JEJUN TUBE      J tube for feedings     CLOSE SPIT FISTULA N/A 4/17/2017    Procedure: CLOSE SPIT FISTULA;;  Surgeon: Jens Wise MD;  [facility]     COMBINED ESOPHAGOSCOPY, GASTROSCOPY, DUODENOSCOPY (EGD), REMOVE ESOPHAGEAL STENT N/A 8/26/2016    Procedure: COMBINED ESOPHAGOSCOPY, GASTROSCOPY, DUODENOSCOPY (EGD), REMOVE ESOPHAGEAL STENT;  Surgeon: Jens Wise MD;  [facility]     COMBINED ESOPHAGOSCOPY, GASTROSCOPY, DUODENOSCOPY (EGD), REMOVE ESOPHAGEAL STENT N/A 2/12/2018    Procedure: COMBINED ESOPHAGOSCOPY, GASTROSCOPY, DUODENOSCOPY (EGD), REMOVE ESOPHAGEAL STENT;  Esophagogastroduodenoscopy With Stent Removal;  Surgeon: Jens Wise MD;  [facility]     COMBINED ESOPHAGOSCOPY, GASTROSCOPY, DUODENOSCOPY (EGD), REPLACE ESOPHAGEAL STENT N/A 8/25/2017    Procedure: COMBINED ESOPHAGOSCOPY, GASTROSCOPY, DUODENOSCOPY (EGD), REPLACE ESOPHAGEAL STENT;;  Surgeon: Jens Wise MD;  [facility]     COMBINED ESOPHAGOSCOPY, GASTROSCOPY, DUODENOSCOPY (EGD), REPLACE ESOPHAGEAL STENT N/A 9/15/2017    Procedure: COMBINED ESOPHAGOSCOPY, GASTROSCOPY, DUODENOSCOPY (EGD), REPLACE ESOPHAGEAL STENT;  Upper Endoscopy with Stent Exchange, Cauterization of Tracheosophageal Fistula, Cauterization of Chest Wound   ;  Surgeon: Jens Wise MD;  [facility]     COMBINED ESOPHAGOSCOPY, GASTROSCOPY, DUODENOSCOPY (EGD), REPLACE ESOPHAGEAL STENT N/A 10/20/2017    Procedure: COMBINED ESOPHAGOSCOPY, GASTROSCOPY, DUODENOSCOPY (EGD), REPLACE ESOPHAGEAL STENT;  Upper Endoscopy with Stent Exchange, Cauterization Tracheosphageal Fistula Tract;  Surgeon: Nalini Barreto MD;  [facility]     COMBINED ESOPHAGOSCOPY, GASTROSCOPY, DUODENOSCOPY (EGD), REPLACE ESOPHAGEAL STENT N/A 11/3/2017    Procedure: COMBINED ESOPHAGOSCOPY, GASTROSCOPY, DUODENOSCOPY (EGD), REPLACE ESOPHAGEAL STENT;  Esophagogastroduodenoscopy, Stent  Revision, Cauterization Of Traceoesophageal Fistula and stent exchange;  Surgeon: Nalini Barreto MD;  [facility]     COMBINED ESOPHAGOSCOPY, GASTROSCOPY, DUODENOSCOPY (EGD), REPLACE ESOPHAGEAL STENT N/A 11/17/2017    Procedure: COMBINED ESOPHAGOSCOPY, GASTROSCOPY, DUODENOSCOPY (EGD), REPLACE ESOPHAGEAL STENT;  Esophagogastroduodenoscopy, Esophogeal Stent Removal, Esophogeal Stent Placement (23x100 EndoMAXX), Cauterization Of Fistula Tract;  Surgeon: Nalini Barreto MD;  [facility]     CREATE SPIT FISTULA N/A 1/9/2017    Procedure: CREATE SPIT FISTULA;  Surgeon: Jens Wise MD;  [facility]     ESOPHAGECTOMY N/A 1/9/2017    Procedure: ESOPHAGECTOMY;  Surgeon: Jnes Wise MD;  [facility]     ESOPHAGOSCOPY, GASTROSCOPY, DUODENOSCOPY (EGD), COMBINED N/A 4/18/2016    Procedure: COMBINED ESOPHAGOSCOPY, GASTROSCOPY, DUODENOSCOPY (EGD);  Surgeon: Alexis Barraza MD;  [facility]     ESOPHAGOSCOPY, GASTROSCOPY, DUODENOSCOPY (EGD), COMBINED N/A 4/25/2016    Procedure: COMBINED ESOPHAGOSCOPY, GASTROSCOPY, DUODENOSCOPY (EGD);  Surgeon: Alexis Barraza MD;  [facility]     ESOPHAGOSCOPY, GASTROSCOPY, DUODENOSCOPY (EGD), COMBINED N/A 5/4/2016    Procedure: COMBINED ESOPHAGOSCOPY, GASTROSCOPY, DUODENOSCOPY (EGD);  Surgeon: Alexis Barraza MD;  [facility]     ESOPHAGOSCOPY, GASTROSCOPY, DUODENOSCOPY (EGD), COMBINED N/A 5/18/2016    Procedure: COMBINED ESOPHAGOSCOPY, GASTROSCOPY, DUODENOSCOPY (EGD);  Surgeon: Alexis Barraza MD;  [facility]     ESOPHAGOSCOPY, GASTROSCOPY, DUODENOSCOPY (EGD), COMBINED N/A 6/22/2016    Procedure: COMBINED ESOPHAGOSCOPY, GASTROSCOPY, DUODENOSCOPY (EGD);  Surgeon: Alexis Barraza MD;  [facility]     ESOPHAGOSCOPY, GASTROSCOPY, DUODENOSCOPY (EGD), COMBINED N/A 7/12/2016    Procedure: COMBINED ESOPHAGOSCOPY, GASTROSCOPY, DUODENOSCOPY (EGD);  Surgeon: Jens Wise MD;   [facility]     ESOPHAGOSCOPY, GASTROSCOPY, DUODENOSCOPY (EGD), COMBINED N/A 4/21/2017    Procedure: COMBINED ESOPHAGOSCOPY, GASTROSCOPY, DUODENOSCOPY (EGD);  Esophagogastroduodenoscopy, Pharyngostomy Tube Placement ;  Surgeon: Jens Wise MD;  [facility]     ESOPHAGOSCOPY, GASTROSCOPY, DUODENOSCOPY (EGD), COMBINED N/A 5/19/2017    Procedure: COMBINED ESOPHAGOSCOPY, GASTROSCOPY, DUODENOSCOPY (EGD);  Upper Endoscopy, Irrigation and Debridement of Chest Wound ;  Surgeon: Nalini Barreto MD;  [facility]     ESOPHAGOSCOPY, GASTROSCOPY, DUODENOSCOPY (EGD), COMBINED N/A 5/23/2017    Procedure: COMBINED ESOPHAGOSCOPY, GASTROSCOPY, DUODENOSCOPY (EGD);;  Surgeon: Nalini Barreto MD;  [facility]     ESOPHAGOSCOPY, GASTROSCOPY, DUODENOSCOPY (EGD), COMBINED N/A 6/27/2017    Procedure: COMBINED ESOPHAGOSCOPY, GASTROSCOPY, DUODENOSCOPY (EGD);  Esophagogastroduodenoscopy With Removal And Replacement Of Esophageal Stent exchange. Exchange of pharyngtomy tube. Chest wound dressing change;  Surgeon: Nalini aBrreto MD;  [facility]     ESOPHAGOSCOPY, GASTROSCOPY, DUODENOSCOPY (EGD), COMBINED N/A 8/4/2017    Procedure: COMBINED ESOPHAGOSCOPY, GASTROSCOPY, DUODENOSCOPY (EGD);  Esophagogastroduodenoscopy, Stent Removal and Stent Replacement. Dressing Change ;  Surgeon: Nalini Barreto MD;  [facility]     ESOPHAGOSCOPY, GASTROSCOPY, DUODENOSCOPY (EGD), COMBINED N/A 8/25/2017    Procedure: COMBINED ESOPHAGOSCOPY, GASTROSCOPY, DUODENOSCOPY (EGD);  Esophagogastroduodenoscopy,  Stent Revision,  Cauterization;  Surgeon: Jens Wise MD;  [facility]     ESOPHAGOSCOPY, GASTROSCOPY, DUODENOSCOPY (EGD), COMBINED N/A 10/2/2017    Procedure: COMBINED ESOPHAGOSCOPY, GASTROSCOPY, DUODENOSCOPY (EGD);  Esophagogastroduodenostomy, Replacement of Esophageal Stent, Cauterization of Tracheosophageal Fistula anc chest wall,   C-arm;  Surgeon: Nalini Barreto MD;  [facility]     ESOPHAGOSCOPY,  GASTROSCOPY, DUODENOSCOPY (EGD), DILATATION, COMBINED N/A 6/29/2016    Procedure: COMBINED ESOPHAGOSCOPY, GASTROSCOPY, DUODENOSCOPY (EGD), DILATATION;  Surgeon: Jens Wise MD;  [facility]     EXPLORE NECK N/A 5/19/2017    Procedure: EXPLORE NECK;;  Surgeon: Nalini Barreto MD;  [facility]     HC UGI ENDOSCOPY W TRANSENDOSCOPIC STENT PLACEMENT N/A 7/12/2016    Procedure: COMBINED ESOPHAGOSCOPY, GASTROSCOPY, DUODENOSCOPY (EGD), PLACE TRANSENDOSCOPIC ESOPHAGEAL STENT;  Surgeon: Jens Wise MD;  [facility]     HC UGI ENDOSCOPY W TRANSENDOSCOPIC STENT PLACEMENT N/A 7/22/2016    Procedure: COMBINED ESOPHAGOSCOPY, GASTROSCOPY, DUODENOSCOPY (EGD), PLACE TRANSENDOSCOPIC ESOPHAGEAL STENT;  Surgeon: Jens Wise MD;  [facility]     INCISION AND DRAINAGE CHEST WASHOUT, COMBINED N/A 5/27/2017    Procedure: COMBINED INCISION AND DRAINAGE CHEST WASHOUT;  Chest washout;  Surgeon: Nalini Barreto MD;  [facility]     INCISION AND DRAINAGE CHEST WASHOUT, COMBINED N/A 5/28/2017    Procedure: COMBINED INCISION AND DRAINAGE CHEST WASHOUT;  Chest washout;  Surgeon: Nalini Barreto MD;  [facility]     INCISION AND DRAINAGE CHEST WASHOUT, COMBINED N/A 6/9/2017    Procedure: COMBINED INCISION AND DRAINAGE CHEST WASHOUT;  Chest washout and dressing change, Esophaogastroduodenoscopy;  Surgeon: Jens Wise MD;  [facility]     INCISION AND DRAINAGE CHEST WASHOUT, COMBINED N/A 7/17/2017    Procedure: COMBINED INCISION AND DRAINAGE CHEST WASHOUT;  Incision and Drainage of right Chest Wound, Esophagogastroduodenoscopy with stent exchange. pharangostomy tube revision;  Surgeon: Jens Wise MD;  [facility]     IRRIGATION AND DEBRIDEMENT CHEST WASHOUT, COMBINED N/A 6/29/2016    Procedure: COMBINED IRRIGATION AND DEBRIDEMENT CHEST WASHOUT;  Surgeon: Jens Wise MD;  [facility]     IRRIGATION AND DEBRIDEMENT CHEST WASHOUT,  COMBINED N/A 5/23/2017    Procedure: COMBINED IRRIGATION AND DEBRIDEMENT CHEST WASHOUT;  COMBINED IRRIGATION AND DEBRIDEMENT CHEST WASHOUT,COMBINED ESOPHAGOSCOPY, GASTROSCOPY, DUODENOSCOPY (EGD) ;  Surgeon: Nalini Barreto MD;  [facility]     IRRIGATION AND DEBRIDEMENT CHEST WASHOUT, COMBINED N/A 5/24/2017    Procedure: COMBINED IRRIGATION AND DEBRIDEMENT CHEST WASHOUT;  Chest wound Washout and Dressing Change;  Surgeon: Nalini Barreto MD;  [facility]     IRRIGATION AND DEBRIDEMENT CHEST WASHOUT, COMBINED N/A 5/25/2017    Procedure: COMBINED IRRIGATION AND DEBRIDEMENT CHEST WASHOUT;  Irrigation and Debridement Chest Washout and dressing change;  Surgeon: Nalini Barreto MD;  [facility]     IRRIGATION AND DEBRIDEMENT CHEST WASHOUT, COMBINED N/A 5/26/2017    Procedure: COMBINED IRRIGATION AND DEBRIDEMENT CHEST WASHOUT;  Irrigation and Debridement Chest Washout with  dressing change;  Surgeon: Nalini Barreto MD;  [facility]     IRRIGATION AND DEBRIDEMENT CHEST WASHOUT, COMBINED N/A 5/30/2017    Procedure: COMBINED IRRIGATION AND DEBRIDEMENT CHEST WASHOUT;  Irrigation and Debridement Chest Washout , PICC line dressing change;  Surgeon: Nalini Barreto MD;  [facility]     IRRIGATION AND DEBRIDEMENT CHEST WASHOUT, COMBINED N/A 5/31/2017    Procedure: COMBINED IRRIGATION AND DEBRIDEMENT CHEST WASHOUT;  Irrigation and Debridement Chest Washout ;  Surgeon: Nalini Barreto MD;  [facility]     IRRIGATION AND DEBRIDEMENT CHEST WASHOUT, COMBINED N/A 6/1/2017    Procedure: COMBINED IRRIGATION AND DEBRIDEMENT CHEST WASHOUT;  Chest Wound Irrigation And Debridement with dressing change ;  Surgeon: Nalini Barreto MD;  [facility]     IRRIGATION AND DEBRIDEMENT CHEST WASHOUT, COMBINED N/A 6/4/2017    Procedure: COMBINED IRRIGATION AND DEBRIDEMENT CHEST WASHOUT;  COMBINED IRRIGATION AND DEBRIDEMENT CHEST WASHOUT, Esophagoscopy, Gastroscopy, Duodenoscopy (EGD) place transendoscopic esophageal stent,    Pharyngostomy and chest wall tube exchange  C-Arm;  Surgeon: Jens Wise MD;  [facility]     IRRIGATION AND DEBRIDEMENT CHEST WASHOUT, COMBINED N/A 6/2/2017    Procedure: COMBINED IRRIGATION AND DEBRIDEMENT CHEST WASHOUT;  Chest Wound Irrigation and Debridement, Dressing Change;  Surgeon: Nalini Barreto MD;  [facility]     LAPAROSCOPIC ASSISTED INSERTION TUBE JEJUNOSTOMY N/A 4/17/2017    Procedure: LAPAROSCOPIC ASSISTED INSERTION TUBE JEJUNOSTOMY;;  Surgeon: Jens Wise MD;  [facility]     LAPAROSCOPY DIAGNOSTIC (GENERAL) N/A 1/9/2017    Procedure: LAPAROSCOPY DIAGNOSTIC (GENERAL);  Surgeon: Jens Wise MD;  [facility]     LAPAROSCOPY DIAGNOSTIC (GENERAL) N/A 4/17/2017    Procedure: LAPAROSCOPY DIAGNOSTIC (GENERAL);  Laparoscopic , Neck Dissection, Spit Fistula Takedown, Laparoscopic Jejunostomy Tube and Pharyngostomy Tube, Gastric Pull up, Upper Endoscopy(EGD)  , Flexible Bronchoscopy ,Sternotomy ;  Surgeon: Jens Wise MD;  [facility]     LUMPECTOMY BREAST Right 2007     NERVE BLOCK PERIPHERAL N/A 8/30/2016    Procedure: NERVE BLOCK PERIPHERAL;  Surgeon: GENERIC ANESTHESIA PROVIDER;  [facility]     NISSEN FUNDOPLICATION  1/2016     PHARYNGOSTOMY N/A 4/18/2016    Procedure: PHARYNGOSTOMY;  Surgeon: Alexis Barraza MD;  [facility]     PHARYNGOSTOMY N/A 4/25/2016    Procedure: PHARYNGOSTOMY;  Surgeon: Alexis Barraza MD;  [facility]     PHARYNGOSTOMY N/A 5/4/2016    Procedure: PHARYNGOSTOMY;  Surgeon: Alexis Barraza MD;  [facility]     PHARYNGOSTOMY N/A 6/22/2016    Procedure: PHARYNGOSTOMY;  Surgeon: Alexis Barraza MD;  [facility]     PHARYNGOSTOMY N/A 6/29/2016    Procedure: PHARYNGOSTOMY;  Surgeon: Jens Wise MD;  [facility]     PHARYNGOSTOMY N/A 4/21/2017    Procedure: PHARYNGOSTOMY;;  Surgeon: Jens Wise MD;  Location:  OR  "    PHARYNGOSTOMY Left 2017    Procedure: PHARYNGOSTOMY;;  Surgeon: Nalini Barreto MD;  [facility]     PICC INSERTION Left 2016    5fr DL BioFlo PICC, 42cm (3cm external) in the L medial brachial vein w/ tip in the SVC RA junction.     PICC INSERTION - Rewire Right 2017    5fr DL BioFlo PICC, 40cm (6cm external) in the R medial brachial vein w/ tip in the SVC RA junction.     REPAIR FISTULA TRACHEOESOPHAGEAL N/A 9/15/2017    Procedure: REPAIR FISTULA TRACHEOESOPHAGEAL;;  Surgeon: Jens Wise MD;  [facility]     THORACOTOMY Right     lung infection - \"hard crust formed on lung\"     THORACOTOMY Left 2017    Procedure: THORACOTOMY;  Surgeon: Jens Wise MD;  [facility]     WRIST SURGERY         Social History   I have reviewed this patient's social history and updated it with pertinent information if needed.  Social History     Tobacco Use     Smoking status: Former Smoker     Packs/day: 1.00     Years: 15.00     Pack years: 15.00     Types: Cigarettes     Last attempt to quit: 1998     Years since quittin.7     Smokeless tobacco: Never Used   Substance Use Topics     Alcohol use: No     Alcohol/week: 0.0 standard drinks     Drug use: No     Family History     I have reviewed this patient's family history and updated it with pertinent information if needed.  Family History   Problem Relation Age of Onset     Alzheimer Disease Mother      Cerebrovascular Disease Father         cerebral hemorrhage     Ovarian Cancer Sister      Breast Cancer Daughter        Medications   I have reviewed this patient's current medications    Allergies   Allergies   Allergen Reactions     Amoxicillin Diarrhea     Ativan [Lorazepam] Other (See Comments)     Hallucinations     Morphine Itching       Physical Exam   Vital Signs: Temp: 96.8  F (36  C) Temp src: Axillary BP: (!) 112/30 Pulse: 109   Resp: 18 SpO2: (!) 81 % O2 Device: Oxymask Oxygen Delivery: 2 " LPM  Weight: 107 lbs 14.4 oz    General Appearance: Acutely ill, in moderate distress  Eyes: Nonicteric  HEENT: JVP elevated above the tragus  Respiratory: Bilateral crackles, no wheezes  Cardiovascular: Very distant heart sounds, tachycardic rate, regular rhythm, S1 and S2 are barely audible  GI: Soft, nontender nondistended  Lymph/Hematologic: No petechiae are noted  Skin: No rashes on limited skin exam  Musculoskeletal: 2+ pitting edema to the level of the knee bilaterally  Neurologic: Intermittently alert, intermittently oriented    Data   Lactic acid is 11.5, brain natruretic peptide is elevated to > 24,000, serum creatinine is normal, LFTs are normal, oxyhemoglobin calculates cardiac index to 3.1 on 2.5 mg of dbu (weight based), pH shows severe metabolic acidosis with a pH of 7.06 troponin is roughly flat at 0.728-0.656-0.706    Imaging is notable for a CTA showing a possible atypical lung infection.   Bedside echocardiogram, as described above shows severe biventricular dysfunction.  There is apical and mid wall akinesis and basal hypokinesis in a pattern consistent with stress-induced cardiomyopathy.  Estimated LVEF is 10 to 15%.  RV is moderately reduced in function and is mildly dilated.  There is severe TR.  Estimated PA pressure is underestimated due to the severe nature of the TR.

## 2020-09-28 NOTE — PROGRESS NOTES
"Major Shift Events:  I assumed care @ 2210 last evening. Unable to get temperature or O2 sats on this lady. She is pale and \"wants to sleep\". Medicated once w/ oxycodone per request for c/o pain in arms and legs .   We have three pressors running and MAP is 60 or lower. Also, have bicarb gtt infusing @ 30 meq/hr. Has received abx. And got dose of lasix w/ poor results.   Plan: Supportive care of this ill pt who has decided to be  DNR/DNI status. Should comfort cares be addressed?  For vital signs and complete assessments, please see documentation flowsheets.     "

## 2020-09-28 NOTE — PROGRESS NOTES
MICU Attending Note:  September 28, 2020    I saw and examined the patient with the resident and fellow during daily multidisciplinary round. I reviewed the labs, imaging studies and cultures.  Case discussed with family members at bed side.  Please see MICU resident's note from (September 28, 2020) which we formulated together and represent our mutual findings, assessment and plan.   Key/major active problems.as follows;  The patient is critically ill due to;  - Hypotension, lactic acidosis, worsening likely due to gut ischemia, due to celiac artery dissection and shock in general. Seen by [facility] surgery no intervention at this point  - Septic and cardiogenic shock (EF of 10%, presumed to be sec to stress cardiomyopathy, on multiple pressors (dobut/vaso/levophed), on vanc/zosyn  - Metabolic/toxic encephalopathy  - Malnutrition  - Cirrhosis  - DNR/DNI  - Awaiting daughter from [place], [place] to determine goal of cares    Critical care time is 35 min excluding procedure time.    HKing Casanova MD  [phone]

## 2020-09-28 NOTE — PROGRESS NOTES
Social Work Services Progress Note    Hospital Day: 7  Collaborated with:  IDT rounds, Chart Review, Bedside RN,     Data: During IDT rounds SW updated that pt likely to transition to comfort cares.    SW contacted bedside ROMAINE Humphries who indicated that pt would be taken off pressors and pass at the hospital.  SW offered support to family RN, indicated this may be helpful.     SW contacted [facility] [phone] [facility] Admissions to provide update that pt would not longer need a bed.      SW went to pts room, per bedside ROMAINE Humphries, pts supportive care had just been turned off and family was present as well as additional family via iPad and a member of Palliative care team. SW offered support services post passing and requested that ROMAINE Humphries call SW if family indicates any needs including but not limited to  or burial service resources.     Intervention:  Assistance with transition to comfort cares.     Assessment:  SW did not meet with pt or family during this interaction, pt transitioning to comfort cares.     Plan:    Anticipated Disposition:  n/a pt expected to pass while in hospital     Barriers to d/c plan:  None identified    Follow Up:  SW will remain available to provide support and resources to family as needed.     KOTA Kurtz, [facility]   [facility]  Pager: [phone]

## 2020-09-28 NOTE — PLAN OF CARE
Major Shift Events:  Patient arrived urgently from CT around 1815. No readable pressure, unable to obtain SPO2, but patient awake and talking. Blood pressure was taken from multiple locations to read 30s/ ++. Multiple ECGs obtained, multiple ultrasounds performed by MICU and cardiology. R internal jugular inserted, R femoral A line inserted, levo started, dobutamine started, bicarb drip to be started. Levo- currently .26, dobutamine to be moved to 5.  Labs:  Lactic trending up, most recent 11.5. Trops trending up, most recent  0.7.  PH 7.1, bicarb 12.      Plan: Continue to monitor, titrate drips, trend labs, insert ojeda, start bicarb drip  For vital signs and complete assessments, please see documentation flowsheets.

## 2020-09-28 NOTE — PROGRESS NOTES
MEDICAL ICU PROGRESS NOTE  09/28/2020      Date of Service (when I saw the patient): 09/28/2020    ASSESSMENT: Minerva Blanco is a 75 year old female with PMH failure to thrive/malnutrition, CAD s/p stent in 02/18, HFpEF, MS, breast cancer s/p partial mastectomy, meniere's disease, paroxysmal afib, GERD s/p multiple esophageal surgeries who presented from TCU and complained of dyspnea, hospital course complicated by multiple ICU transfers for hypotension. She was admitted to the ICU with shock, likely combination of cardiogenic shock (stress-induced cardiomyopathy) and distributive shock (unclear infectious source), requiring 3 pressors. 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 Given recent decompensation, family decided that Ms. Blanco's wishes would be to focus on quality of life and no longer pursue life prolonging measures.     CHANGES and MAJOR THINGS TODAY:   - Transition to full focus on comfort  - Discontinue pressors  - Discontinue stress dose steroids  - Discontinue antibiotics  - Vascular surgery weighed in, will not pursue anticoagulation    PLAN:    Neuro:  # Pain and sedation  - Dilaudid 0.2-0.4 mg Q30 min PRN  - Oxycodone 5 mg Q4H PRN  - Cleebrex 100 mg BID PRN    Pulmonary:  # Dyspnea  - Oxygen PRN    Cardiovascular:  # Cardiogenic shock  # Stress-induced cardiomyopathy  Pressors discontinued.     ID:  # Unclear infectious source, possibly PNA  - Discontinue antibiotics per goals of care    Hematology:    # Possible thrombus in thoracic aorta  - Will not initiate heparin gtt per goals of care    Spiritual:  - Palliative  to visit    General Cares/Prophylaxis:    DVT Prophylaxis: None  GI Prophylaxis: Not indicated  Restraints: None  Family Communication: Family conference with daughters at the bedside. Coleen was available in person. Chandan was involved by neoSaej.   Code Status: DNR/DNI    Disposition:  - Continue in ICU, will likely pass quickly    Patient seen and  findings/plan discussed with medical ICU staff, Dr. Casanova.    Sandee Newell MD  PGY-3, Internal Medicine-Pediatrics    ====================================  INTERVAL HISTORY:   Requiring 3 pressors overnight to maintain MAPs in the low 60s. Intermittently responsive. CTA with focal aortic dissection above the celiac artery extends through the celiac origin to the celiac bifurcation.    Care conference today at bedside with daughters, Chandan (by video call) and Coleen (in person). They understand that their mom's body is shutting down, and that there's little we can do medically to prolong life, and that if life were prolonged, it would not be of the quality that she would want. They are clear that in this situation, she would not want further life-prolonging measures.     OBJECTIVE:   1. VITAL SIGNS:   Temp:  [95.7  F (35.4  C)-96.8  F (36  C)] 96.8  F (36  C)  Pulse:  [] 106  Resp:  [7-18] 9  BP: ()/(30-97) 112/30  MAP:  [57 mmHg-82 mmHg] 66 mmHg  Arterial Line BP: ()/(43-68) 96/52  SpO2:  [60 %-100 %] 86 %  Resp: 9    2. INTAKE/ OUTPUT:   I/O last 3 completed shifts:  In: 1392.43 [P.O.:30; I.V.:1362.43]  Out: 140 [Urine:140]    3. PHYSICAL EXAMINATION:  General: Comfortable appearing. Eyes closed.   HEENT: Anisicoria present, right pupil greater than left.   Neuro: Responsive, but doesn't clearly follow directions.   Pulm/Resp: Course breath sounds bilaterally. No increased work of breathing.   CV: Mildly tachycardic.   Abdomen: Soft, non-distended.  Incisions/Skin: Pale.     4. LABS:   Arterial Blood Gases   Recent Labs   Lab 09/28/20 0002 09/27/20 2133 09/27/20 2005   PH 7.03* 7.11* 7.18*   PCO2 34* 26* 26*   PO2 99 159* 350*   HCO3 9* 8* 10*     Complete Blood Count   Recent Labs   Lab 09/28/20  0423 09/28/20  0122 09/27/20  1945 09/27/20  1336   WBC 18.5* 17.7* 15.0* 8.1   HGB 8.9* 9.2* 9.5* 10.4*    454* 462* 520*     Basic Metabolic Panel  Recent Labs   Lab  09/28/20 0423 09/28/20 0122 09/27/20 1945 09/27/20  1336   * 145* 142 141   POTASSIUM 4.1 4.3 4.4 5.4*   CHLORIDE 111* 112* 110* 110*   CO2 13* 9* 16* 19*   BUN 4* 4* 4* 4*   CR 0.72 0.67 0.64 0.60   * 115* 110* 69*     Liver Function Tests  Recent Labs   Lab 09/28/20 0423 09/28/20 0122 09/27/20 1945 09/27/20 1336 09/25/20  0611  09/22/20  0712   AST 88* 73* 57* Canceled, Test credited 36   < > 37   ALT 21 20 15 19 17   < > 19   ALKPHOS 134 145 153* 189* 162*   < > 191*   BILITOTAL 2.0* 2.0* 2.1* 1.7* 1.5*   < > 1.5*   ALBUMIN 1.7* 1.8* 1.8* 2.1* 1.9*   < > 1.1*   INR 3.21*  --  2.43*  --  1.67*  --  1.60*    < > = values in this interval not displayed.     Coagulation Profile  Recent Labs   Lab 09/28/20 0423 09/27/20 1945 09/25/20  0611 09/22/20  0712   INR 3.21* 2.43* 1.67* 1.60*   PTT  --  52*  --   --        5. RADIOLOGY:   Recent Results (from the past 24 hour(s))   CTA Chest Abdomen Pelvis w Contrast    Narrative    Exam: Computed tomographic angiography of the chest, abdomen and  pelvis without and with contrast including 3D reformations dated  9/27/2020 6:25 PM    Clinical information: Epigastric pain, nausea, diaphoresis, shoulder  pain with elevated troponin. Concern for intra-abdominal process, also  dissection.    Technique: Axial images through the chest and abdomen obtained before  the administration of intravenous contrast media and following the  injection of contrast media  in the arterial phase. Source images  reviewed as well as 3D and multi-planar reconstructions at a 3D  workstation.    Contrast: Isovue 370 100 mL IV.    Comparison: Chest abdomen pelvis CT 8/18/2020..    Findings:      Suboptimal contrast bolus timing with the majority of the bolus within  the left ventricle.    Focal aortic dissection above the celiac artery extends through the  celiac origin to the celiac bifurcation. Common hepatic artery  originates from one lumen and the splenic artery originates from  the  other. It is difficult to discern which is the true and which is a  false lumen. Hepatic arteries are patent. The splenic artery cannot be  followed to the splenic hilum.    The spleen is poorly enhancing.    Bilateral renal artery greater than 70% diameter stenosis.    Multiplanar thoracic aortic diameters:      Sinuses of Valsalva: 30 mm    Sinotubular junction: 28 mm    Ascending aorta: 31 mm    Arch: 27 mm    Proximal descending thoracic aorta: 22 mm    Mid descending thoracic aorta: 20 mm    Descending thoracic aorta at the diaphragm: 20 mm     There is normal branching pattern of the great vessels. Right  vertebral artery occlusion in the low neck. Right vertebral artery  string sign at the level of the mandible.      Scattered atherosclerotic calcifications of the aortic arch. Origins  of the brachiocephalic artery, left common carotid artery and left  subclavian artery show no focal abnormality. The proximal pulmonary  vasculature  appears normal.     Superior mesenteric artery patent. Inferior mesenteric artery origin  stenosis. Distal inferior mesenteric artery branches patent.    Inferior vena cava reflux into hepatic veins.    Mesenteric, splenic, and portal veins are not opacified, likely due to  bolus timing or cardiac output.    Chest and abdomen:     The heart size is within normal limits. Moderate to heavy  atherosclerotic calcifications of the left coronary artery. Right  atrial enlargement. No suspicious sternal, hilar, or axillary lymph  nodes.    The trachea and central airways are patent. Small left greater than  right pleural effusion. Smooth interlobular septal thickening at the  lung bases. Bronchiectasis with bronchial wall thickening at the lung  bases. There are reticular and interstitial opacities in the upper  lobes. Fluid tracking within the left major fissure. No new or  enlarging pulmonary nodule.    Evaluation of the abdomen is limited due to metallic streak artifact  from  lumbar fusion hardware.    Diffuse hepatic steatosis. No focal hepatic mass. The gallbladder is  unremarkable new. No intrahepatic or extra hepatic biliary ductal  dilatation. The pancreas and adrenal glands are unremarkable. The  kidneys are unremarkable. No obstructing renal stone. No  hydronephrosis. The urinary bladder is unremarkable. Postoperative  changes of esophagectomy with retrosternal gastric pull-up. No  abnormally dilated loops of large or small bowel. No abnormal bowel  wall thickening or enhancement. The uterus is unremarkable. Trace free  fluid within the pelvis.    Diffuse osteopenia. Postoperative changes of posterior fusion at T8  through the sacrum with interpedicular screws and interconnecting rods  with sparing of the T12 and L1 vertebral bodies. Vertebral body fusion  device at T12-L1. Chronic left rib 10 deformity. No evidence for  hardware complication. Diffuse anasarca.    Dr. Young called Dr. MARLENI Arce at 07:56 on 9/28/2020 and notified him  of the dissection.      Impression    IMPRESSION:  1. Focal aortic dissection above the celiac artery extends through the  celiac origin to the celiac bifurcation. Common hepatic artery  originates from one lumen and the splenic artery originates from the  other. It is difficult to discern which is the true and which is a  false lumen.    2. Splenic artery cannot be traced to the splenic hilum. The spleen is  poorly enhancing. Whether this is due to bolus timing or ischemia  cannot be determined.    3. 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    4. No intra-abdominal abscess.    5. New upper lobe predominant reticular and groundglass opacities.  Findings concerning for atypical infection.    6. Small bilateral pleural effusions.    7. Diffuse anasarca.    8. Hepatic steatosis.    I have personally reviewed the examination and initial interpretation  and I agree with the findings.    KYLEIGH YOUNG MD   XR Chest Port 1 View    Narrative     XR CHEST PORT 1 VW  2020 8:24 PM      HISTORY: Central line    COMPARISON: Chest x-ray 2020, CT same day    TECHNIQUE: Semiupright frontal view of the chest    FINDINGS: Bilateral interstitial opacities with small bilateral  pleural effusions. No pneumothorax. Cardiac size is normal. The  trachea is midline. Upper abdomen is unremarkable. Postoperative  changes of posterior spinal instrumentation and fusion with cage in  the low thoracic/upper spine, incompletely visualized.     Right IJ central venous catheter tip projecting over the superior  cavoatrial junction.      Impression    IMPRESSION: Right IJ central venous catheter tip projecting over the  superior cavoatrial junction.    I have personally reviewed the examination and initial interpretation  and I agree with the findings.    LUCINDA VARGAS MD   Echocardiogram Limited    Narrative    [id number]  [id number]  [id number]  [id number]^DAWIT^CORNELIO           [facility],[place]  Echocardiography Laboratory  [address] [place] [id number]     Name: FAVIAN MALONE  MRN: [id number]  : 1945  Study Date: 2020 10:06 AM  Age: 75 yrs  Gender: Female  Patient Location: [facility]  Reason For Study: CHF  Ordering Physician: CORNELIO CHINCHILLA  Performed By: Cornelio Chinchilla MD     _____________________________________________________________________________  __     _____________________________________________________________________________  __        Interpretation Summary  The Ejection Fraction is estimated at 5-10%.  Global right ventricular function is severely reduced.  No pericardial effusion is present.  _____________________________________________________________________________  __        Left Ventricle  The Ejection Fraction is estimated at 5-10%.     Right Ventricle  Global right ventricular function is severely reduced.     Tricuspid Valve  Moderate tricuspid insufficiency is present.     Pericardium  No pericardial  effusion is present.     _____________________________________________________________________________  __                       Report approved by: Anjelica Jackson 2020 07:54 AM                 _____________________________________________________________________________  __      Echo Limited    Narrative    [id number]  [id number]  [id number]  [id number]^PADMINI^DEREK^[facility],[place]  Echocardiography Laboratory  [address] [place] [id number]     Name: FAVIAN MALONE  MRN: [id number]  : 1945  Study Date: 2020 07:20 AM  Age: 75 yrs  Gender: Female  Patient Location: [facility]  Reason For Study: CHF  Ordering Physician: DEREK WASHINGTON  Performed By: France Ragsdale RDCS     BSA: 1.4 m2  Height: 60 in  Weight: 107 lb  HR: 109  BP: 90/75 mmHg  _____________________________________________________________________________  __        Procedure  Limited Portable Echo Adult. Contrast Optison. Optison (NDC #0539-0890-57)  given intravenously. Patient was given 5 ml mixture of 3 ml Optison and 6 ml  saline. 4 ml wasted.  _____________________________________________________________________________  __        Interpretation Summary  LVEF 21% based on biplane 2D tracing.  Normal contraction of basal segments with hypokinesis to akinesis of mid and  distal segments,typical for stress cardiomyopathy if no CAD.  Global right ventricular function is moderately reduced.  The inferior vena cava cannot be assessed.  No pericardial effusion is present.  _____________________________________________________________________________  __        Left Ventricle  LVEF 21% based on biplane 2D tracing. Normal contraction of basal segments  with hypokinesis to akinesis of mid and distal segments,typical for stress  cardiomyopathy if no CAD.     Right Ventricle  Global right ventricular function is moderately reduced.     Tricuspid Valve  Moderate to severe tricuspid  insufficiency is present. The right ventricular  systolic pressure is approximated at 33.7 mmHg plus the right atrial pressure.     Vessels  The inferior vena cava cannot be assessed.        Pericardium  No pericardial effusion is present.  _____________________________________________________________________________  __     MMode/2D Measurements & Calculations  LVOT diam: 1.8 cm  LVOT area: 2.7 cm2     EF(MOD-bp): 20.9 %        Doppler Measurements & Calculations  TR max jorge: 290.4 cm/sec  TR max P.7 mmHg     _____________________________________________________________________________  __           Report approved by: Anjelica Jackson 2020 08:00 AM

## 2020-09-28 NOTE — PLAN OF CARE
Major Shift Events:  Drips turned off, pt placed on comfort cares.  Daughter at bedside.  Family present via Ipad as well.    Plan: Make patient comfortable on comfort cares.    For vital signs and complete assessments, please see documentation flowsheets.

## 2020-09-28 NOTE — CONSULTS
VASCULAR SURGERY CONSULT NOTE  Consulted by: Medicine  Reason for consultation: celiac artery dissection     HPI:  Minerva Blanco is a 75 year old year old female who has a PMH including failure to thrive, etoh use, CAD, HFpEF, MS, PAF, GERD s/p multiple esophageal surgeries, who presented from TCU with dyspnea 9/21, and hospital course c/b multiple ICU transfers for hypotension.    Per notes, pt had acute onset of abdominal pain and hypotension, transferred to MICU on 9/27 and now on three pressors.  Concern for takotsubo cardiomyopathy, with Cardiology onboard, and per medicine consideration of mixed septic and cardiogenic shock.  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  Pt is awake, not currently responding verbally to questions.    Review Of Systems: 10 point ROS not obtained due to critical illness     PAST MEDICAL HISTORY:  Past Medical History:   Diagnosis Date     Breast cancer (H) 2007    right side - lumpectomy, chemo, and local radiation     Chronic infection     infection/wound for two years after esoph surgery     Compression fracture of spine (H)     T12-L1     Coronary artery disease 04/2018    s/p PCI with ADOLFO to proximal and mid RCA     Esophageal perforation      Fibromyalgia      GERD (gastroesophageal reflux disease)      Hiatal hernia      IBS (irritable bowel syndrome)      Meniere's disease     deaf left ear     Multiple sclerosis (H)      Noninfectious ileitis      Other chronic pain      Paroxysmal atrial fibrillation (H)      PAST SURGICAL HISTORY:  Past Surgical History:   Procedure Laterality Date     APPENDECTOMY       BACK SURGERY  5/1/2015    lumbar fusion     BRONCHOSCOPY FLEXIBLE N/A 4/17/2017    Procedure: BRONCHOSCOPY FLEXIBLE;;  Surgeon: Jens Wise MD;  [facility]      GASTROSTOMY/JEJUN TUBE      J tube for feedings     CLOSE SPIT FISTULA N/A 4/17/2017    Procedure: CLOSE SPIT FISTULA;;  Surgeon: Jens Wise MD;  [facility]      COMBINED ESOPHAGOSCOPY, GASTROSCOPY, DUODENOSCOPY (EGD), REMOVE ESOPHAGEAL STENT N/A 8/26/2016    Procedure: COMBINED ESOPHAGOSCOPY, GASTROSCOPY, DUODENOSCOPY (EGD), REMOVE ESOPHAGEAL STENT;  Surgeon: Jens Wise MD;  [facility]     COMBINED ESOPHAGOSCOPY, GASTROSCOPY, DUODENOSCOPY (EGD), REMOVE ESOPHAGEAL STENT N/A 2/12/2018    Procedure: COMBINED ESOPHAGOSCOPY, GASTROSCOPY, DUODENOSCOPY (EGD), REMOVE ESOPHAGEAL STENT;  Esophagogastroduodenoscopy With Stent Removal;  Surgeon: Jens Wise MD;  [facility]     COMBINED ESOPHAGOSCOPY, GASTROSCOPY, DUODENOSCOPY (EGD), REPLACE ESOPHAGEAL STENT N/A 8/25/2017    Procedure: COMBINED ESOPHAGOSCOPY, GASTROSCOPY, DUODENOSCOPY (EGD), REPLACE ESOPHAGEAL STENT;;  Surgeon: Jens Wise MD;  [facility]     COMBINED ESOPHAGOSCOPY, GASTROSCOPY, DUODENOSCOPY (EGD), REPLACE ESOPHAGEAL STENT N/A 9/15/2017    Procedure: COMBINED ESOPHAGOSCOPY, GASTROSCOPY, DUODENOSCOPY (EGD), REPLACE ESOPHAGEAL STENT;  Upper Endoscopy with Stent Exchange, Cauterization of Tracheosophageal Fistula, Cauterization of Chest Wound   ;  Surgeon: Jens Wise MD;  [facility]     COMBINED ESOPHAGOSCOPY, GASTROSCOPY, DUODENOSCOPY (EGD), REPLACE ESOPHAGEAL STENT N/A 10/20/2017    Procedure: COMBINED ESOPHAGOSCOPY, GASTROSCOPY, DUODENOSCOPY (EGD), REPLACE ESOPHAGEAL STENT;  Upper Endoscopy with Stent Exchange, Cauterization Tracheosphageal Fistula Tract;  Surgeon: Nalini Barreto MD;  [facility]     COMBINED ESOPHAGOSCOPY, GASTROSCOPY, DUODENOSCOPY (EGD), REPLACE ESOPHAGEAL STENT N/A 11/3/2017    Procedure: COMBINED ESOPHAGOSCOPY, GASTROSCOPY, DUODENOSCOPY (EGD), REPLACE ESOPHAGEAL STENT;  Esophagogastroduodenoscopy, Stent Revision, Cauterization Of Traceoesophageal Fistula and stent exchange;  Surgeon: Nalini Barreto MD;  [facility]     COMBINED ESOPHAGOSCOPY, GASTROSCOPY, DUODENOSCOPY (EGD), REPLACE ESOPHAGEAL STENT N/A  11/17/2017    Procedure: COMBINED ESOPHAGOSCOPY, GASTROSCOPY, DUODENOSCOPY (EGD), REPLACE ESOPHAGEAL STENT;  Esophagogastroduodenoscopy, Esophogeal Stent Removal, Esophogeal Stent Placement (23x100 EndoMAXX), Cauterization Of Fistula Tract;  Surgeon: Nalini Barreto MD;  [facility]     CREATE SPIT FISTULA N/A 1/9/2017    Procedure: CREATE SPIT FISTULA;  Surgeon: Jens Wise MD;  [facility]     ESOPHAGECTOMY N/A 1/9/2017    Procedure: ESOPHAGECTOMY;  Surgeon: Jens Wise MD;  [facility]     ESOPHAGOSCOPY, GASTROSCOPY, DUODENOSCOPY (EGD), COMBINED N/A 4/18/2016    Procedure: COMBINED ESOPHAGOSCOPY, GASTROSCOPY, DUODENOSCOPY (EGD);  Surgeon: lAexis Barraza MD;  [facility]     ESOPHAGOSCOPY, GASTROSCOPY, DUODENOSCOPY (EGD), COMBINED N/A 4/25/2016    Procedure: COMBINED ESOPHAGOSCOPY, GASTROSCOPY, DUODENOSCOPY (EGD);  Surgeon: Alexis Barraza MD;  [facility]     ESOPHAGOSCOPY, GASTROSCOPY, DUODENOSCOPY (EGD), COMBINED N/A 5/4/2016    Procedure: COMBINED ESOPHAGOSCOPY, GASTROSCOPY, DUODENOSCOPY (EGD);  Surgeon: Alexis Barraza MD;  [facility]     ESOPHAGOSCOPY, GASTROSCOPY, DUODENOSCOPY (EGD), COMBINED N/A 5/18/2016    Procedure: COMBINED ESOPHAGOSCOPY, GASTROSCOPY, DUODENOSCOPY (EGD);  Surgeon: Alexis Barraza MD;  [facility]     ESOPHAGOSCOPY, GASTROSCOPY, DUODENOSCOPY (EGD), COMBINED N/A 6/22/2016    Procedure: COMBINED ESOPHAGOSCOPY, GASTROSCOPY, DUODENOSCOPY (EGD);  Surgeon: Alexis Barraza MD;  [facility]     ESOPHAGOSCOPY, GASTROSCOPY, DUODENOSCOPY (EGD), COMBINED N/A 7/12/2016    Procedure: COMBINED ESOPHAGOSCOPY, GASTROSCOPY, DUODENOSCOPY (EGD);  Surgeon: Jens Wise MD;  [facility]     ESOPHAGOSCOPY, GASTROSCOPY, DUODENOSCOPY (EGD), COMBINED N/A 4/21/2017    Procedure: COMBINED ESOPHAGOSCOPY, GASTROSCOPY, DUODENOSCOPY (EGD);  Esophagogastroduodenoscopy, Pharyngostomy Tube  Placement ;  Surgeon: Jens Wise MD;  [facility]     ESOPHAGOSCOPY, GASTROSCOPY, DUODENOSCOPY (EGD), COMBINED N/A 5/19/2017    Procedure: COMBINED ESOPHAGOSCOPY, GASTROSCOPY, DUODENOSCOPY (EGD);  Upper Endoscopy, Irrigation and Debridement of Chest Wound ;  Surgeon: Nalini Barreto MD;  [facility]     ESOPHAGOSCOPY, GASTROSCOPY, DUODENOSCOPY (EGD), COMBINED N/A 5/23/2017    Procedure: COMBINED ESOPHAGOSCOPY, GASTROSCOPY, DUODENOSCOPY (EGD);;  Surgeon: Nalini Barreto MD;  [facility]     ESOPHAGOSCOPY, GASTROSCOPY, DUODENOSCOPY (EGD), COMBINED N/A 6/27/2017    Procedure: COMBINED ESOPHAGOSCOPY, GASTROSCOPY, DUODENOSCOPY (EGD);  Esophagogastroduodenoscopy With Removal And Replacement Of Esophageal Stent exchange. Exchange of pharyngtomy tube. Chest wound dressing change;  Surgeon: Nalini Barreto MD;  [facility]     ESOPHAGOSCOPY, GASTROSCOPY, DUODENOSCOPY (EGD), COMBINED N/A 8/4/2017    Procedure: COMBINED ESOPHAGOSCOPY, GASTROSCOPY, DUODENOSCOPY (EGD);  Esophagogastroduodenoscopy, Stent Removal and Stent Replacement. Dressing Change ;  Surgeon: Nalini Barreto MD;  [facility]     ESOPHAGOSCOPY, GASTROSCOPY, DUODENOSCOPY (EGD), COMBINED N/A 8/25/2017    Procedure: COMBINED ESOPHAGOSCOPY, GASTROSCOPY, DUODENOSCOPY (EGD);  Esophagogastroduodenoscopy,  Stent Revision,  Cauterization;  Surgeon: Jens Wise MD;  [facility]     ESOPHAGOSCOPY, GASTROSCOPY, DUODENOSCOPY (EGD), COMBINED N/A 10/2/2017    Procedure: COMBINED ESOPHAGOSCOPY, GASTROSCOPY, DUODENOSCOPY (EGD);  Esophagogastroduodenostomy, Replacement of Esophageal Stent, Cauterization of Tracheosophageal Fistula anc chest wall,   C-arm;  Surgeon: Nalini Barreto MD;  [facility]     ESOPHAGOSCOPY, GASTROSCOPY, DUODENOSCOPY (EGD), DILATATION, COMBINED N/A 6/29/2016    Procedure: COMBINED ESOPHAGOSCOPY, GASTROSCOPY, DUODENOSCOPY (EGD), DILATATION;  Surgeon: Jens Wise MD;  Location:  OR      EXPLORE NECK N/A 5/19/2017    Procedure: EXPLORE NECK;;  Surgeon: Nalini Barreto MD;  [facility]     HC UGI ENDOSCOPY W TRANSENDOSCOPIC STENT PLACEMENT N/A 7/12/2016    Procedure: COMBINED ESOPHAGOSCOPY, GASTROSCOPY, DUODENOSCOPY (EGD), PLACE TRANSENDOSCOPIC ESOPHAGEAL STENT;  Surgeon: Jens Wise MD;  [facility]     HC UGI ENDOSCOPY W TRANSENDOSCOPIC STENT PLACEMENT N/A 7/22/2016    Procedure: COMBINED ESOPHAGOSCOPY, GASTROSCOPY, DUODENOSCOPY (EGD), PLACE TRANSENDOSCOPIC ESOPHAGEAL STENT;  Surgeon: Jens Wise MD;  [facility]     INCISION AND DRAINAGE CHEST WASHOUT, COMBINED N/A 5/27/2017    Procedure: COMBINED INCISION AND DRAINAGE CHEST WASHOUT;  Chest washout;  Surgeon: Nalini Barreto MD;  [facility]     INCISION AND DRAINAGE CHEST WASHOUT, COMBINED N/A 5/28/2017    Procedure: COMBINED INCISION AND DRAINAGE CHEST WASHOUT;  Chest washout;  Surgeon: Nalini Barreto MD;  [facility]     INCISION AND DRAINAGE CHEST WASHOUT, COMBINED N/A 6/9/2017    Procedure: COMBINED INCISION AND DRAINAGE CHEST WASHOUT;  Chest washout and dressing change, Esophaogastroduodenoscopy;  Surgeon: Jens Wise MD;  [facility]     INCISION AND DRAINAGE CHEST WASHOUT, COMBINED N/A 7/17/2017    Procedure: COMBINED INCISION AND DRAINAGE CHEST WASHOUT;  Incision and Drainage of right Chest Wound, Esophagogastroduodenoscopy with stent exchange. pharangostomy tube revision;  Surgeon: Jens Wise MD;  [facility]     IRRIGATION AND DEBRIDEMENT CHEST WASHOUT, COMBINED N/A 6/29/2016    Procedure: COMBINED IRRIGATION AND DEBRIDEMENT CHEST WASHOUT;  Surgeon: Jens Wise MD;  [facility]     IRRIGATION AND DEBRIDEMENT CHEST WASHOUT, COMBINED N/A 5/23/2017    Procedure: COMBINED IRRIGATION AND DEBRIDEMENT CHEST WASHOUT;  COMBINED IRRIGATION AND DEBRIDEMENT CHEST WASHOUT,COMBINED ESOPHAGOSCOPY, GASTROSCOPY, DUODENOSCOPY (EGD) ;  Surgeon: Estevan  Nalini NAVARRO MD;  [facility]     IRRIGATION AND DEBRIDEMENT CHEST WASHOUT, COMBINED N/A 5/24/2017    Procedure: COMBINED IRRIGATION AND DEBRIDEMENT CHEST WASHOUT;  Chest wound Washout and Dressing Change;  Surgeon: Nalini Barreto MD;  [facility]     IRRIGATION AND DEBRIDEMENT CHEST WASHOUT, COMBINED N/A 5/25/2017    Procedure: COMBINED IRRIGATION AND DEBRIDEMENT CHEST WASHOUT;  Irrigation and Debridement Chest Washout and dressing change;  Surgeon: Nalini Barreto MD;  [facility]     IRRIGATION AND DEBRIDEMENT CHEST WASHOUT, COMBINED N/A 5/26/2017    Procedure: COMBINED IRRIGATION AND DEBRIDEMENT CHEST WASHOUT;  Irrigation and Debridement Chest Washout with  dressing change;  Surgeon: Nalini Barreto MD;  [facility]     IRRIGATION AND DEBRIDEMENT CHEST WASHOUT, COMBINED N/A 5/30/2017    Procedure: COMBINED IRRIGATION AND DEBRIDEMENT CHEST WASHOUT;  Irrigation and Debridement Chest Washout , PICC line dressing change;  Surgeon: Nalini Barreto MD;  [facility]     IRRIGATION AND DEBRIDEMENT CHEST WASHOUT, COMBINED N/A 5/31/2017    Procedure: COMBINED IRRIGATION AND DEBRIDEMENT CHEST WASHOUT;  Irrigation and Debridement Chest Washout ;  Surgeon: Nalini Barreto MD;  [facility]     IRRIGATION AND DEBRIDEMENT CHEST WASHOUT, COMBINED N/A 6/1/2017    Procedure: COMBINED IRRIGATION AND DEBRIDEMENT CHEST WASHOUT;  Chest Wound Irrigation And Debridement with dressing change ;  Surgeon: Nalini Barreto MD;  [facility]     IRRIGATION AND DEBRIDEMENT CHEST WASHOUT, COMBINED N/A 6/4/2017    Procedure: COMBINED IRRIGATION AND DEBRIDEMENT CHEST WASHOUT;  COMBINED IRRIGATION AND DEBRIDEMENT CHEST WASHOUT, Esophagoscopy, Gastroscopy, Duodenoscopy (EGD) place transendoscopic esophageal stent,   Pharyngostomy and chest wall tube exchange  C-Arm;  Surgeon: Jens Wise MD;  [facility]     IRRIGATION AND DEBRIDEMENT CHEST WASHOUT, COMBINED N/A 6/2/2017    Procedure: COMBINED IRRIGATION  AND DEBRIDEMENT CHEST WASHOUT;  Chest Wound Irrigation and Debridement, Dressing Change;  Surgeon: Nalini Barreto MD;  [facility]     LAPAROSCOPIC ASSISTED INSERTION TUBE JEJUNOSTOMY N/A 4/17/2017    Procedure: LAPAROSCOPIC ASSISTED INSERTION TUBE JEJUNOSTOMY;;  Surgeon: Jens Wise MD;  [facility]     LAPAROSCOPY DIAGNOSTIC (GENERAL) N/A 1/9/2017    Procedure: LAPAROSCOPY DIAGNOSTIC (GENERAL);  Surgeon: Jens Wise MD;  [facility]     LAPAROSCOPY DIAGNOSTIC (GENERAL) N/A 4/17/2017    Procedure: LAPAROSCOPY DIAGNOSTIC (GENERAL);  Laparoscopic , Neck Dissection, Spit Fistula Takedown, Laparoscopic Jejunostomy Tube and Pharyngostomy Tube, Gastric Pull up, Upper Endoscopy(EGD)  , Flexible Bronchoscopy ,Sternotomy ;  Surgeon: Jens Wise MD;  [facility]     LUMPECTOMY BREAST Right 2007     NERVE BLOCK PERIPHERAL N/A 8/30/2016    Procedure: NERVE BLOCK PERIPHERAL;  Surgeon: GENERIC ANESTHESIA PROVIDER;  [facility]     NISSEN FUNDOPLICATION  1/2016     PHARYNGOSTOMY N/A 4/18/2016    Procedure: PHARYNGOSTOMY;  Surgeon: Alexis Barraza MD;  [facility]     PHARYNGOSTOMY N/A 4/25/2016    Procedure: PHARYNGOSTOMY;  Surgeon: Alexis Barraza MD;  [facility]     PHARYNGOSTOMY N/A 5/4/2016    Procedure: PHARYNGOSTOMY;  Surgeon: Alexis Barraza MD;  [facility]     PHARYNGOSTOMY N/A 6/22/2016    Procedure: PHARYNGOSTOMY;  Surgeon: Alexis Barraza MD;  [facility]     PHARYNGOSTOMY N/A 6/29/2016    Procedure: PHARYNGOSTOMY;  Surgeon: Jens Wise MD;  [facility]     PHARYNGOSTOMY N/A 4/21/2017    Procedure: PHARYNGOSTOMY;;  Surgeon: Jens Wise MD;  [facility]     PHARYNGOSTOMY Left 5/31/2017    Procedure: PHARYNGOSTOMY;;  Surgeon: Nalini Barreto MD;  [facility]     PICC INSERTION Left 8/25/2016    5fr DL BioFlo PICC, 42cm (3cm external) in the L medial brachial  "vein w/ tip in the SVC RA junction.     PICC INSERTION - Rewire Right 2017    5fr DL BioFlo PICC, 40cm (6cm external) in the R medial brachial vein w/ tip in the SVC RA junction.     REPAIR FISTULA TRACHEOESOPHAGEAL N/A 9/15/2017    Procedure: REPAIR FISTULA TRACHEOESOPHAGEAL;;  Surgeon: Jens Wise MD;  [facility]     THORACOTOMY Right     lung infection - \"hard crust formed on lung\"     THORACOTOMY Left 2017    Procedure: THORACOTOMY;  Surgeon: Jens Wise MD;  [facility]     WRIST SURGERY         FAMILY HISTORY:  Family History   Problem Relation Age of Onset     Alzheimer Disease Mother      Cerebrovascular Disease Father         cerebral hemorrhage     Ovarian Cancer Sister      Breast Cancer Daughter        SOCIAL HISTORY:   Social History     Tobacco Use     Smoking status: Former Smoker     Packs/day: 1.00     Years: 15.00     Pack years: 15.00     Types: Cigarettes     Last attempt to quit: 1998     Years since quittin.7     Smokeless tobacco: Never Used   Substance Use Topics     Alcohol use: No     Alcohol/week: 0.0 standard drinks     Drug use: No       HOME MEDICATIONS:  Prior to Admission medications    Medication Sig Start Date End Date Taking? Authorizing Provider   carboxymethylcellulose PF (REFRESH PLUS) 0.5 % ophthalmic solution Place 1 drop into both eyes 4 times daily as needed for dry eyes   Yes Unknown, Entered By History   folic acid (FOLVITE) 1 MG tablet Take 1 mg by mouth daily   Yes Unknown, Entered By History   furosemide (LASIX) 20 MG tablet Take 1 tablet (20 mg) by mouth daily 20  Yes Lula Roberts PA-C   ketotifen (ZADITOR) 0.025 % ophthalmic solution Place 1 drop into both eyes daily as needed for itching   Yes Unknown, Entered By History   lactobacillus rhamnosus, GG, (CULTURELL) capsule Take 1 capsule by mouth 2 times daily   Yes Unknown, Entered By History   loperamide (IMODIUM A-D) 2 MG tablet Take 2 mg by " mouth 4 times daily as needed    Yes Reported, Patient   melatonin 3 MG tablet Take 1 tablet (3 mg) by mouth nightly as needed for sleep 8/20/20  Yes Zaira Caruso MD   methocarbamol (ROBAXIN) 500 MG tablet Take 1 tablet (500 mg) by mouth nightly as needed for muscle spasms 9/25/20  Yes Lula Roberts PA-C   midodrine (PROAMATINE) 5 MG tablet Take 1 tablet (5 mg) by mouth 3 times daily (with meals) 9/25/20  Yes Lula Roberts PA-C   multivitamin, therapeutic (THERA-VIT) TABS tablet Take 1 tablet by mouth daily 9/26/20  Yes Lula Roberts PA-C   omeprazole (PRILOSEC) 20 MG DR capsule Take 1 capsule by mouth 3 times daily before meals and at bedtime as needed.   Yes Unknown, Entered By History   potassium chloride (KLOR-CON) 20 MEQ packet Take 20 mEq by mouth 2 times daily   Yes Unknown, Entered By History   potassium phosphate, monobasic, (K-PHOS) 500 MG tablet Take 250 mg by mouth daily   Yes Unknown, Entered By History   thiamine (B-1) 100 MG tablet Take 100 mg by mouth daily   Yes Unknown, Entered By History   venlafaxine (EFFEXOR-XR) 37.5 MG 24 hr capsule Take 37.5 mg by mouth daily   Yes Unknown, Entered By History   albuterol (PROAIR HFA/PROVENTIL HFA/VENTOLIN HFA) 108 (90 Base) MCG/ACT inhaler Inhale 1-2 puffs into the lungs every 4 hours as needed for shortness of breath / dyspnea or wheezing    Unknown, Entered By History   gabapentin (NEURONTIN) 100 MG capsule Take 100 mg by mouth At Bedtime     Unknown, Entered By History   OTHER MEDICAL SUPPLIES every morning Daily weights.    Reported, Patient   OTHER MEDICAL SUPPLIES 4 times daily BP checks qid.    Reported, Patient   OTHER MEDICAL SUPPLIES Legs: Black socks and Compriflex LEs. Thigh high wraps. Leave on at all times. If soiled may remove thigh portions.    every shift    Reported, Patient   vitamin D2 (ERGOCALCIFEROL) 18239 units (1250 mcg) capsule Take 50,000 Units by mouth once a week    Unknown, Entered By History        VITAL SIGNS:  BP (!) 112/30   Pulse 106   Temp 96.8  F (36  C) (Axillary)   Resp 10   Ht 1.524 m (5')   Wt 48.9 kg (107 lb 14.4 oz)   SpO2 (!) 86%   BMI 21.07 kg/m      Intake/Output Summary (Last 24 hours) at 9/28/2020 0841  Last data filed at 9/28/2020 0400  Gross per 24 hour   Intake 904.24 ml   Output 75 ml   Net 829.24 ml       Labs:  ROUTINE IP LABS (Last four results)  BMP  Recent Labs   Lab 09/28/20 0423 09/28/20 0122 09/27/20 1945 09/27/20  1336   * 145* 142 141   POTASSIUM 4.1 4.3 4.4 5.4*   CHLORIDE 111* 112* 110* 110*   ANNABELLE 7.8* 7.4* 7.5* 8.0*   CO2 13* 9* 16* 19*   BUN 4* 4* 4* 4*   CR 0.72 0.67 0.64 0.60   * 115* 110* 69*     CBC  Recent Labs   Lab 09/28/20 0423 09/28/20 0122 09/27/20 1945 09/27/20  1336   WBC 18.5* 17.7* 15.0* 8.1   RBC 2.31* 2.41* 2.49* 2.73*   HGB 8.9* 9.2* 9.5* 10.4*   HCT 29.6* 31.4* 32.0* 33.7*   * 130* 129* 123*   MCH 38.5* 38.2* 38.2* 38.1*   MCHC 30.1* 29.3* 29.7* 30.9*   RDW 14.5 14.1 14.4 14.5    454* 462* 520*     INR  Recent Labs   Lab 09/28/20 0423 09/27/20 1945 09/25/20  0611 09/22/20  0712   INR 3.21* 2.43* 1.67* 1.60*       PHYSICAL EXAM:  Constitutional: awake, ill appearing, thin, pale  Cardiovascular: tachy on tele, 3 pressors   Respiratory: breathing on room air   Neuro: following commands  Neck: no asymmetry  GI/Abdomen: abdomen soft, non-tender, nondistended, multiple scars  MSK: able to move all extremities without weakness or ataxia  Extremities: ACE wrap in place R foot   Hematology: no bruising on visible skin    IMAGING:  Cta Chest Abdomen Pelvis W Contrast    Result Date: 9/27/2020  Impression: 1. No evidence for aortic dissection. 2. No intra-abdominal abscess. 3. New upper lobe predominant reticular and groundglass opacities. Findings concerning for atypical infection. 4. Small bilateral pleural effusions. 5. Diffuse anasarca. 6. Hepatic steatosis.     Xr Chest Port 1 View    Result Date: 9/27/2020  XR CHEST  PORT 1 VW  9/27/2020 8:24 PM  HISTORY: Central line COMPARISON: Chest x-ray 9/27/2020, CT same day TECHNIQUE: Semiupright frontal view of the chest FINDINGS: Bilateral interstitial opacities with small bilateral pleural effusions. No pneumothorax. Cardiac size is normal. The trachea is midline. Upper abdomen is unremarkable. Postoperative changes of posterior spinal instrumentation and fusion with cage in the low thoracic/upper spine, incompletely visualized. Right IJ central venous catheter tip projecting over the superior cavoatrial junction.     IMPRESSION: Right IJ central venous catheter tip projecting over the superior cavoatrial junction. I have personally reviewed the examination and initial interpretation and I agree with the findings. LUCINDA VARGAS MD    Xr Chest Port 1 View    Result Date: 9/27/2020  Portable chest INDICATION: Chest pain COMPARISON: 9/23/2020 FINDINGS: Thoracolumbar spinal rodding again noted. Small left pleural effusion and associated retrocardiac atelectasis appear similar. Small right pleural effusion appears similar. Right axillary surgical clips are again present. Increased density in the crown of the right humeral head could indicate avascular necrosis. Heart size appears normal. Aortic arch atherosclerosis.     IMPRESSION: Unchanged bilateral small pleural effusions with associated bibasilar atelectasis. 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 Thoracolumbar spinal rodding. LUCINDA VARGAS MD    Xr Abdomen Port 1 View    Result Date: 9/27/2020  Portable abdomen 1 view INDICATION: Chest and abdominal pain COMPARISON: MRI abdomen 9/22/2020 an chest radiograph 9/23/2020 FINDINGS: Thoracolumbar spinal rodding again noted with distal screws projecting over the iliac bones bilaterally. Nonobstructive bowel gas pattern.     IMPRESSION: Thoracolumbar and pelvic spinal rodding. LUCINDA VARGAS MD    Echocardiogram Limited    Result Date: 9/28/2020  [id number]  [id number]^DAWIT^CORNELIO    [facility] [address] [place] [id number]  Name: FAVIAN MALONE MRN: [id number] : 1945 Study Date: 2020 10:06 AM Age: 75 yrs Gender: Female Patient Location: [facility] Reason For Study: CHF Ordering Physician: CORNELIO CHINCHILLA Performed By: Cornelio Chinchilla MD  _____________________________________________________________________________ __  _____________________________________________________________________________ __   Interpretation Summary The Ejection Fraction is estimated at 5-10%. Global right ventricular function is severely reduced. No pericardial effusion is present. _____________________________________________________________________________ __   Left Ventricle The Ejection Fraction is estimated at 5-10%.  Right Ventricle Global right ventricular function is severely reduced.  Tricuspid Valve Moderate tricuspid insufficiency is present.  Pericardium No pericardial effusion is present.  _____________________________________________________________________________ __        Report approved by: Anjelica Jackson 2020 07:54 AM      _____________________________________________________________________________ __      Echo Limited    Result Date: 2020  [id number]^PADMINI^DEREK^[facility] [address] [place], [place] [address]  Name: FAVIAN MALONE MRN: [id number] : 1945 Study Date: 2020 07:20 AM Age: 75 yrs Gender: Female Patient Location: [facility] Reason For Study: CHF Ordering Physician: DEREK WASHINGTON Performed By: France Ragsdale RDCS  BSA: 1.4 m2 Height: 60 in Weight: 107 lb HR: 109 BP: 90/75 mmHg _____________________________________________________________________________ __   Procedure Limited Portable Echo Adult. Contrast Optison. Optison (NDC #8746-3796-19) given  intravenously. Patient was given 5 ml mixture of 3 ml Optison and 6 ml saline. 4 ml wasted. _____________________________________________________________________________ __   Interpretation Summary LVEF 21% based on biplane 2D tracing. Normal contraction of basal segments with hypokinesis to akinesis of mid and distal segments,typical for stress cardiomyopathy if no CAD. Global right ventricular function is moderately reduced. The inferior vena cava cannot be assessed. No pericardial effusion is present. _____________________________________________________________________________ __   Left Ventricle LVEF 21% based on biplane 2D tracing. Normal contraction of basal segments with hypokinesis to akinesis of mid and distal segments,typical for stress cardiomyopathy if no CAD.  Right Ventricle Global right ventricular function is moderately reduced.  Tricuspid Valve Moderate to severe tricuspid insufficiency is present. The right ventricular systolic pressure is approximated at 33.7 mmHg plus the right atrial pressure.  Vessels The inferior vena cava cannot be assessed.   Pericardium No pericardial effusion is present. _____________________________________________________________________________ __  MMode/2D Measurements & Calculations LVOT diam: 1.8 cm LVOT area: 2.7 cm2  EF(MOD-bp): 20.9 %   Doppler Measurements & Calculations TR max jorge: 290.4 cm/sec TR max P.7 mmHg  _____________________________________________________________________________ __    Report approved by: Anjelica Jackson 2020 08:00 AM        Patient Active Problem List   Diagnosis     Abscess     Esophageal perforation     Esophageal stricture     Mediastinitis     Gastroesophageal reflux disease, esophagitis presence not specified     Long-term (current) use of anticoagulants [Z79.01]     Hx of esophagectomy     History of esophagectomy     Pneumonia     Neck abscess     Esophageal anastomotic leak     Moderate protein-calorie malnutrition  (H)     MS (multiple sclerosis) (H)     Hypokalemia     Fall     Pain     Acute dyspnea     Acute kidney failure, unspecified (H)       ASSESSMENT:  Minerva Blanco is a 75 year old year old female who has a PMH including failure to thrive, etoh use, CAD, HFpEF, MS, PAF, GERD s/p multiple esophageal surgeries, who presented from TCU with dyspnea 9/21, and hospital course c/b multiple ICU transfers for hypotension.  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  Pt on three pressors and DNR/DNI; risk of proceeding to OR in critically ill, unstable patient would outweigh benefit at this time. Per bedside RN, primary team planning to transition to hospice later today.      PLAN:  - If still pursuing restorative course of care, could consider COLE for further evaluation of mixing phenomenon vs clot noted in thoracic aorta   - Could initiate heparin gtt from vascular perspective  - No vascular intervention at this time for celiac artery dissection given patent SMA   - Defer remainder of care to primary team     Seen and examined with Dr. Jones and Dr. Violet Nelson MD  Surgery Resident PGY-2  Pg [phone]

## 2020-09-28 NOTE — PROGRESS NOTES
SPIRITUAL HEALTH SERVICES  SPIRITUAL ASSESSMENT Progress Note (Palliative Focus)  [facility] ([place]) 4A    REFERRAL SOURCE: Staff referral.    Visit at patient Minerva Blanco's bedside with daughter Coleen (in person) and daughter Chandan, grandchildren, and children-in-law via video chat.    Family are grieving as they anticipate Minerva's death, while supporting one another and sharing stories of her life and character. They shared their love for Minerva while also giving her permission to die.     Her Yazidism (Shinto) hitesh has been a central part of her life, and family find comfort in the belief that many other family members are waiting to greet her in heaven. Family joined in saying end of life prayers and asking God's blessing for Minerva.     I offered grief and spiritual support.    Plan: No follow up planned unless further bereavement support is requested.    Sally Ambriz  Palliative   Pager [phone]  [facility] Inpatient Team Consult pager [phone] (M-F 8-4:30)  After-hours Answering Service [phone]

## 2020-09-28 NOTE — PROGRESS NOTES
Critical Care Services Progress Note:  I personally examined and evaluated the patient today. I formulated today s plan with the house staff team or resident(s) and agree with the findings and plan in the associated note (see separately attested resident note).   I have evaluated all laboratory values and imaging studies from the past 24 hours.  Summary of hospital course:  75 year old female with a history of breast cancer, CAD, Tricuspid regurgitation, malnourishment, presents to the ICU with shock. She had been treated intermittently with antibiotics, and recently was getting diureses.  This morning was noted to have abdominal pain and slightly elevated troponin. Later on, she developed shock with a lactic acid or 9.  CTA of C/A/P performed and she was transferred to the MICU    Overnight events/pertinent findings today:  She continues to endorse abdominal pain.  Started on levophed through a midline catheter.  Right internal jugular CVC and right femoral Arterial line placed.     Delirious, but can follow simple commands.     Data  Lactic acid 9.4 --> 10.7 --> 11.5  BNP 24,602  procalcitonin 0.27  Troponin 0.728-->0.656 --> 0.706    WBC 5.0-->8.1-->15.0    Lipase < 10    VBG 7.10/40/47 with oxyhemoglobin of 62% while on dobutamine and levophed  ABG 7.11/26/159    Beside ECHO by me with global hypokinesis of LV and RV.     Chest/Abdomen/Pelvis reviewed by me.  GGO in bilateral lungs.  No dissection.  I can't visualize inferior mesenteric artery well, but case was discussed with radiology and it appears patent.     Assessment/plan:    Shock: Cardiogenic. Suspected stress cardiomyopathy.  Cardiology has been consulted.  Currently on Dobutamine and levophed. MAP goal > 65. Will increase dobutamine and try to decrease levophed with goal ScVO2 between 60% and 70&.  Will start concentrated NaHCO3 infusion to help with acidemia.  Unclear cause for the new cardiomyopathy.  Possible sepsis from a pneumonia.  Will culture  and treat with Vanc/Zosyn/Doxy.     She is currently DNR/DNI.  I explained her grim prognosis to her daughter and asked that she come in to see her Mom, which she did. Will consult palliative care in the morning.      Rest per resident note.    I spent a total of 70 minutes (excluding procedure time) personally providing and directing critical care services at the bedside and on the critical care unit for this patient.     Colten Laureano MD

## 2020-09-29 NOTE — DEATH PRONOUNCEMENT
MD DEATH PRONOUNCEMENT    Called to pronounce Minerva Blanco dead.    Physical Exam: Unresponsive to noxious stimuli, Spontaneous respirations absent, Breath sounds absent, Heart sounds absent, Pupillary light reflex absent and Corneal blink reflex absent    Patient was pronounced dead at 0601 AM, 2020.    Preliminary Cause of Death: Cardiomyopathy     Active Problems:    Acute dyspnea    Acute kidney failure, unspecified (H)     Infectious disease present?: NO    Communicable disease present? (examples: HIV, chicken pox, TB, Ebola, CJD) :  NO    Multi-drug resistant organism present? (example: MRSA): NO    Please consider an autopsy if any of the following exist:  NO Unexpected or unexplained death during or following any dental, medical, or surgical diagnostic treatment procedures.   NO Death of mother at or up to seven days after delivery.     NO All  and pediatric deaths.     NO Death where the cause is sufficiently obscure to delay completion of the death certificate.   NO Deaths in which autopsy would confirm a suspected illness/condition that would affect surviving family members or recipients of transplanted organs.     The following deaths must be reported to the 's Office:  NO A death that may be due entirely or in part to any factors other than natural disease (recent surgery, recent trauma, suspected abuse/neglect).   NO A death that may be an accident, suicide, or homicide.     NO Any sudden, unexpected death in which there is no prior history of significant heart disease or any other condition associated with sudden death.   NO A death under suspicious, unusual, or unexpected circumstances.    NO Any death which is apparently due to natural causes but in which the  does not have a personal physician familiar with the patient s medical history, social, or environmental situation or the circumstances of the terminal event.   NO Any death apparently due to Sudden  Infant Death Syndrome.     NO Deaths that occur during, in association with, or as consequences of a diagnostic, therapeutic, or anesthetic procedure.   NO Any death in which a fracture of a major bone has occurred within the past (6) six months.   NO A death of persons note seen by their physician within 120 days of demise.     NO Any death in which the  was an inmate of a public institution or was in the custody of Law Enforcement personnel.   NO  All unexpected deaths of children   NO Solid organ donors   NO Unidentified bodies   NO Deaths of persons whose bodies are to be cremated or otherwise disposed of so that the bodies will later be unavailable for examination;   NO Deaths unattended by a physician outside of a licensed healthcare facility or licensed residential hospice program   NO Deaths occurring within 24 hours of arrival to a health care facility if death is unexpected.    NO Deaths associated with the decedent s employment.   NO Deaths attributed to acts of terrorism.   NO Any death in which there is uncertainty as to whether it is a medical examiner s care should be discussed with the medical investigator.        Body disposition: Autopsy was discussed with family member:  Daughter in person.  Permission for autopsy was declined.

## 2020-09-29 NOTE — PROGRESS NOTES
Patient passed at 0601. Daughter at bedside. Patient belongings sent with daughter. [facility] notified.

## 2020-09-29 NOTE — DISCHARGE SUMMARY
[facility], [place]    Death Summary - Medicine & Pediatrics    Date of Admission:  2020  Date of Death: 2020  Attending Provider: Dr. 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  Possible thrombus in thoracic aorta  Acute on chronic heart failure with reduced ejection fraction  Possible aspiration pneumonia  Hepatic steatosis, possible early cirrhosis  Moderate alcohol use disorder    Cause of death: Shock secondary to stress-induced cardiomyopathy, aortic dissection, and sepsis    Hospital Course   Minerva Blanco is a 75 year old female with a past medical history significant for failure to thrive/malnutrition, heart failure with preserved ejection fraction, coronary artery disease s/p stent, multiple sclerosis, breast cancer s/p partial mastectomy, meniere's disease, paroxysmal atrial fibrillation, and GERD s/p multiple esophageal surgeries who presented to the hospital from a TCU for dyspnea, fatigue, and abnormal labs. Her hospital course was complicated by multiple ICU transfers for hypotension. She was re-admitted to the ICU on 20 with shock, likely combination of cardiogenic shock (stress-induced cardiomyopathy) and distributive shock (unclear infectious source), requiring 3 pressors. She was found to have an aortic dissection involving the celiac artery. Given her overall decompensation, family decided that Ms. Blanco's wishes would be to focus on quality of life and no longer pursue life prolonging measures. Pressors and antibiotics were discontinued and the patient  shortly thereafter.     Sandee Newell MD   PGY-3, Internal Medicine-Pediatrics  [facility],  [place]    ______________________________________________________________________      Significant Results and Procedures   Echo 9/28/2020  LVEF 21% based on biplane 2D tracing.  Normal contraction of basal segments with hypokinesis to akinesis of mid and distal segments,typical for stress cardiomyopathy if no CAD. Global right ventricular function is moderately reduced. The inferior vena cava cannot be assessed. No pericardial effusion is present.    CTA C/A/P 9/27/2020  IMPRESSION:  1. 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 Common hepatic artery originates from one lumen and the splenic artery originates from the other. It is difficult to discern which is the true and which is a false lumen.  2. Splenic artery cannot be traced to the splenic hilum. The spleen is poorly enhancing. Whether this is due to bolus timing or ischemia cannot be determined.  3. 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  4. No intra-abdominal abscess.  5. New upper lobe predominant reticular and groundglass opacities. Findings concerning for atypical infection.  6. Small bilateral pleural effusions.  7. Diffuse anasarca.  8. Hepatic steatosis.    Consultations This Hospital Stay   PHYSICAL THERAPY ADULT IP CONSULT  OCCUPATIONAL THERAPY ADULT IP CONSULT  MEDICATION HISTORY IP PHARMACY CONSULT  NUTRITION SERVICES ADULT IP CONSULT  LYMPHEDEMA THERAPY IP CONSULT  SPEECH LANGUAGE PATH ADULT IP CONSULT  PHARMACY TO DOSE VANCO  VASCULAR ACCESS CARE ADULT IP CONSULT  PHARMACY TO DOSE VANCO  PALLIATIVE CARE ADULT IP CONSULT  VASCULAR ACCESS CARE ADULT IP CONSULT  GI HEPATOLOGY ADULT IP CONSULT  NUTRITION SERVICES ADULT IP CONSULT  PHYSICAL THERAPY ADULT IP CONSULT  OCCUPATIONAL THERAPY ADULT IP CONSULT  CARDIOLOGY GENERAL ADULT IP CONSULT  PHARMACY TO DOSE VANCO  PHARMACY TO DOSE VANCO  PALLIATIVE CARE ADULT IP CONSULT  VASCULAR SURGERY ADULT IP CONSULT  SPIRITUAL  HEALTH SERVICES IP CONSULT    Primary Care Physician   [facility]

## 2020-09-30 LAB
BACTERIA SPEC CULT: NO GROWTH
Lab: NORMAL
SPECIMEN SOURCE: NORMAL

## 2020-10-03 LAB
BACTERIA SPEC CULT: NO GROWTH
SPECIMEN SOURCE: NORMAL

## 2021-04-22 NOTE — Clinical Note
Hey, She needs a CXR today please. She is in room 20 waiting to schedule. Thanks H Speaking Coherently

## 2021-05-19 NOTE — PLAN OF CARE
Problem: Goal Outcome Summary  Goal: Goal Outcome Summary  Outcome: No Change  Vital signs:  Temp: 95.9  F (35.5  C) Temp src: Oral BP: 128/59   Heart Rate: 73 Resp: 20 SpO2: 99 % O2 Device: None (Room air)  Pt is A&Ox4. Pain managed with oxycodone prn and scheduled tylenol. Denied SOB. TPN infusing at 60 ml/hr via PICC. Lipids infusing at 15 ml/hr for 12hr for total volume of 180 ml via PICC. J-tube clamped overnight, flushed after meds. Pharyngostomy tube to LIS with green output, irrigated with 10 cc per pt tolerance. Voided adequately in commode with liquid watery stools. Pt appears to be resting between cares. Continue POC.        No. SHY screening performed.  STOP BANG Legend: 0-2 = LOW Risk; 3-4 = INTERMEDIATE Risk; 5-8 = HIGH Risk

## 2021-05-26 ENCOUNTER — RECORDS - HEALTHEAST (OUTPATIENT)
Dept: ADMINISTRATIVE | Facility: CLINIC | Age: 76
End: 2021-05-26

## 2021-05-27 ENCOUNTER — RECORDS - HEALTHEAST (OUTPATIENT)
Dept: ADMINISTRATIVE | Facility: CLINIC | Age: 76
End: 2021-05-27

## 2021-05-27 NOTE — PATIENT INSTRUCTIONS - HE
Plan:   Interventions-    Recent kyphoplasty in the back by [facility] [facility] radiology at L1 at [place].     Follow up in 4 weeks to evaluate the effectiveness of the treatment interventions ordered today.     Ok to get an MRI of the lumbar spine due to the foot drop on the right foot.     Will continue the dilaudid at this time     Also taking gabapentin at this time.     Continue the tizanidine at this time for muscle spasms.     Added an order for home care as they are already coming to your house, you have a partial right foot drop that would benefit from therapy as well as PFO boot.     Prescription Drug Management will be continued by the [facility]  A narcotic contract was signed by the patient and  Patient is unable to get narcotics from other providers. They will be subject to random UAs.      Orders placed today  Medications that were ordered today   Requested Prescriptions     Signed Prescriptions Disp Refills     HYDROmorphone (DILAUDID) 2 MG tablet 120 tablet 0     Sig: Take 1 tablet (2 mg total) by mouth every 6 (six) hours as needed for pain (max of 4 per day).     gabapentin (NEURONTIN) 400 MG capsule 120 capsule 1     Sig: Take 2 capsules (800 mg total) by mouth 2 (two) times a day.     tiZANidine (ZANAFLEX) 2 MG tablet 60 tablet 1     Sig: Take 1-2 tablets (2-4 mg total) by mouth at bedtime as needed (muscle spascity).     Orders Placed This Encounter   Procedures     MR Lumbar Spine Without Contrast     Standing Status:   Future     Standing Expiration Date:   4/8/2020     Order Specific Question:   Can the procedure be changed per Radiologist protocol?     Answer:   Yes     Order Specific Question:   Is the patient claustrophobic and in need of sedation to complete their MR scan?     Answer:   No     Ambulatory referral to Physical Therapy     Referral Priority:   Routine     Referral Type:   Physical Therapy     Referral Reason:   Evaluation and Treatment     Requested Specialty:    Physical Therapy     Number of Visits Requested:   1       UDT/SWAB:  Patient required a random Urine Drug Testing, due to the need to comply with [place] Model Policy Guidelines and CDC Guideline for the use of any controlled substances. This is to ensure that patient is compliant with treatment, and monitor for risks such as diversion, abuse, or any other aberrant behaviors. Patient is either being considered for or taking a controlled substance. Unexpected findings will be discussed and treatment decision may be adjusted. Testing is being implemented across the board randomly w/o bias related to age, race, gender, socioeconomic status or Anab[employer] affiliation.    The patient understand todays plan and has their questions answered in regards to expectations and current treatment plan.     SAFETY REMINDERS  No alcohol while taking controlled substances. Alcohol is not an illegal substance, it is unsafe to use in combination. It is a build up of substances in the body that can be extremely hazardous and may cause respirations to slow to a dangerous rate resulting in hospitalization, brain damage, or death.    Opioid medications have been associated with sharp rise in unintentional overdose and death.  Overdose is a condition characterized by the consumption in excess of a particular drug causing adverse effects. This can happen b/c you are sick, accidentally or intentionally took an extra dose, are on multiple medication that can interact. Someone took your medication and they are not use to the medication.  Symptoms of overdose include:   !breathing slow and shallow, erratic or not at all  !pinpoint pupils, hallucinations  !confusion  !muscle jerks, slack muscles   !extreme sleepiness or loss of alertness   !awake but not able to talk   !face pale or clammy, vomiting, for lighter skinned people, the skin tone turns bluish purple, for darker skinned people, it turns grayish or ashen   If in a situation where  overdose is a concern engage the emergency response system (dial 911).    In one study it was noted that 80% of unintentional overdoses occurred in people who were taking a combination of opioids and benzodiazepines.    Do not sell, loan, borrow or share your opioid medication with anyone. Deaths have occurred as a result of this practice. It is illegal and patients are being prosecuted.     Prevent unexpected access/loss of medication: Keep medication locked. Only carry what you need with you.    Millie Weaver, [facility]  [address]  [place] [address]  Ph: [phone]  Fax: [phone]

## 2021-05-27 NOTE — PROGRESS NOTES
PAIN CLINIC FOLLOW UP PROGRESS NOTE    CC:  Chief Complaint   Patient presents with     Back Pain     Knee Pain       HPI  Minerva Blanco is a 74 y.o. female who presents for evaluation   Chief Complaint   Patient presents with     Back Pain     Knee Pain    that is causing continued pain. Since the last visit the patient denies any trips to the urgent care or ED specifically for their pain. The patient denies any new medications, diagnoses since the last visit. Specific questions indicated the patient wanted addressed today include: to follow up on her chronic pain after her ongoing issues after her fall for which she sustained vertebral fractures for which she has had kyphoplasties. She notes that she has foot drop on the right foot and this has been like this for a few weeks, she notes that she has talked to her GP about it.       Major issues:  1. Foot drop, right foot    2. Chronic pain syndrome    3. Muscle spasm        Today the pain is located in their right shoulder and is described as constant when it is aggravated it lasts for constantly with intermittent flares, and is rated at a 8 on a scale of 1-10 mostly in her right foot at this time and her low back. Patient reports increased falls x now since December, she feels that her feet catch on things     Associated symptoms: Denies any loss of bladder control, fevers/chills, unintentional weight loss, weakness, numbness or pain that interferes with sleep.   Aggravating factors include: increased movement  Alleviating factors: tramadol and reduced movement  Adverse effects of medications: NONE   Functional symptoms: affects every aspect of her life, activity and socialization, ADLs  Current subjective treatment efficacy: Fair      Previous Medical History  Social History     Substance and Sexual Activity   Alcohol Use No     Social History     Substance and Sexual Activity   Drug Use No     Social History     Tobacco Use   Smoking Status Former Smoker      Packs/day: 1.00     Years: 15.00     Pack years: 15.00     Types: Cigarettes     Last attempt to quit: 1998     Years since quittin.2   Smokeless Tobacco Never Used       Pertinent Pain Medications/interventions:    Patient is currently using dilaudid 2 mg tabs up to 4 per day for her ongoing chronic pain, she also notes that the gabapentin works best for her at 800 mg two times a day instead of three times a day due the lethargy that comes with it in the middle of the day.     She currently takes tramadol 3-4 tabs per day as well as gabapentin 400 mg at night.   Tizanidine as needed      Review of Systems:  12 point systems were reviewed with pt as documented on pt health form and the patient denies any new diagnosis or changes in 12 point system review since the last visit.     Physical Exam  Vitals:    19 0919   Resp: 16   Weight: (!) 86 lb (39 kg)   Height: 5' (1.524 m)     General- patient is alert and oriented, in NAD, well-groomed, well-nourished  Psych- Judgment and insight normal, AOx4, recent and remote memory normal, mood and affect normal  Eyes- pupils are equal and reactive, conjunctiva is clear bilaterally, no ptosis is noted.   Respiratory- breathing is non-labored  Cardiovascular- extremities warm and well perfused, no peripheral edema or varicosities.  Musculoskeletal- gait is abnormal, extremities with no joint swelling, erythema, or warmth. Weight loss noted  Low back pain continues  Neuro- normal strength, gait abnormalities noted radicular pain noted bilaterally, right ankle flexion reduced to 3/5 and extension is 4/5- is has been ongoing for the past 3-4 weeks per the patient.   Integumentary- no rashes, dermatitis or discolorations noted throughout, no open wounds noted.    Medications    Current Outpatient Medications:      aspirin 81 mg chewable tablet, Chew 1 tablet (81 mg total) daily., Disp: , Rfl: 0     atorvastatin (LIPITOR) 80 MG tablet, Take 1 tablet (80 mg total) by  mouth daily., Disp: 90 tablet, Rfl: 3     BENEFIBER, GUAR GUM, ORAL, Take by mouth daily. 2 teaspoonfuls in 4-8 oz liquid, Disp: , Rfl:      diphenhydrAMINE (BENADRYL) 25 mg tablet, Take 25-50 mg by mouth at bedtime as needed for sleep., Disp: , Rfl:      diphenoxylate-atropine (LOMOTIL) 2.5-0.025 mg per tablet, Take 1 tablet by mouth 2 (two) times a day as needed for diarrhea., Disp: , Rfl:      ferrous sulfate 325 (65 FE) MG tablet, Take 1 tablet by mouth daily with breakfast., Disp: , Rfl:      Lactobacillus rhamnosus GG (CULTURELLE) 10-15 Billion cell capsule, Take 1 capsule by mouth 2 (two) times a day. 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 , Disp: , Rfl:      loperamide (IMODIUM) 2 mg capsule, Take 2 mg by mouth 4 (four) times a day as needed for diarrhea., Disp: , Rfl:      melatonin 10 mg Tab, Take 10 mg by mouth at bedtime., Disp: , Rfl:      metoprolol succinate (TOPROL-XL) 50 MG 24 hr tablet, Take 1 tablet (50 mg total) by mouth daily., Disp: 180 tablet, Rfl: 3     multivitamin with minerals (THERA-M) 9 mg iron-400 mcg Tab tablet, Take 1 tablet by mouth daily., Disp: , Rfl:      omeprazole (PRILOSEC) 20 MG capsule, Take 20 mg by mouth 2 (two) times a day before meals., Disp: , Rfl:      ranitidine (ZANTAC) 150 MG tablet, Take 150 mg by mouth 2 (two) times a day., Disp: , Rfl:      simethicone (GAS RELIEF) 125 mg cap capsule, Take 125 mg by mouth every 6 (six) hours as needed for flatulence., Disp: , Rfl:      ticagrelor (BRILINTA) 90 mg Tab, Take 1 tablet (90 mg total) by mouth 2 (two) times a day., Disp: 180 tablet, Rfl: 1     traZODone (DESYREL) 100 MG tablet, Take 150 mg by mouth at bedtime. , Disp: , Rfl:      acetaminophen (TYLENOL) 500 MG tablet, Take 2 tablets (1,000 mg total) by mouth 4 (four) times a day as needed for pain., Disp: 240 tablet, Rfl: 0     gabapentin (NEURONTIN) 400 MG capsule, Take 2 capsules (800 mg total) by mouth 2 (two) times a day., Disp: 120 capsule, Rfl: 1     HYDROmorphone  (DILAUDID) 2 MG tablet, Take 1 tablet (2 mg total) by mouth every 6 (six) hours as needed for pain (max of 4 per day)., Disp: 120 tablet, Rfl: 0     tiZANidine (ZANAFLEX) 2 MG tablet, Take 1-2 tablets (2-4 mg total) by mouth at bedtime as needed (muscle spascity)., Disp: 60 tablet, Rfl: 1    Lab:  Last UDS on 01/25/2018 had expected results. Any abnormal results have been discussed with the patient and may change the plan of care. Please see the scanned document from the outside lab under the media tab. This was reviewed with the patient.      Imaging:  No new imaging.      Recent   Dated 4/9/2019 was reviewed with the patient today.   Paper copy reivewed    Assessment:   Minerva Blanco is a 74 y.o. female seen in clinic today for   Chief Complaint   Patient presents with     Back Pain     Knee Pain       Presents the pain Center today with her 4 wheeled walker which she previously did not have she does note that she has been tripping forward when she walks checking her right foot she notes that she is unable to extend her toes upon examination it appears that she has a diminished strength in the flexion position of the right ankle which is likely indicative of a neural impingement.  Patient is currently homebound is getting home therapy through [facility].  After today's examination patient does note she updated her primary care provider previously.     Due to the patient's increased fall risk with the partial right foot drop I will order a lumbar spine MRI due to her recent falls which may have precipitated this event.  I have also ordered an updated physical therapy evaluation and treatment for the right foot and consideration of a PFO boot reduced forward falls.     In regards to the opioid management will continue the tizanidine at bedtime for the muscle spasticity the patient complains of which is related to her low back pain and recent kyphoplasty, as well as the Dilaudid  currently she is taking 2 mg tablets up to 4/day as needed.  We will continue gabapentin 800 mg twice daily patient notes that the midday dose does make her tired so she has been avoiding it.     To follow-up in 4 weeks for reevaluation today during her visit she is tearful at times most notably frustration is expressed due to her physical limitations and the recent loss of her spouse.  Behavioral therapy was offered as well as antidepressants patient feels at this time she would like to wait.     Patients current MME is 32  Patient to call clinic for next month's prescription 5 days in advance. Based on the patients response to their previous narcotic therapy and quality of life, which includes their participation in ADLs, I recommend that the narcotics therapy continue, however the changes listed below will be incorporated into their plan of care.     Patient set goals to   1. To reduce her pain so that she can be more independent     Plan:   Interventions-    Recent kyphoplasty in the back by [facility] at [facility] at [place].     Follow up in 4 weeks to evaluate the effectiveness of the treatment interventions ordered today.     Ok to get an MRI of the lumbar spine due to the foot drop on the right foot.     Will continue the dilaudid at this time     Also taking gabapentin at this time.     Continue the tizanidine at this time for muscle spasms.     Added an order for home care as they are already coming to your house, you have a partial right foot drop that would benefit from therapy as well as PFO boot.     Prescription Drug Management will be continued by the [facility]  A narcotic contract was signed by the patient and  Patient is unable to get narcotics from other providers. They will be subject to random UAs.      Orders placed today  Medications that were ordered today   Requested Prescriptions     Signed Prescriptions Disp Refills     HYDROmorphone (DILAUDID) 2 MG tablet 120 tablet 0      Sig: Take 1 tablet (2 mg total) by mouth every 6 (six) hours as needed for pain (max of 4 per day).     gabapentin (NEURONTIN) 400 MG capsule 120 capsule 1     Sig: Take 2 capsules (800 mg total) by mouth 2 (two) times a day.     tiZANidine (ZANAFLEX) 2 MG tablet 60 tablet 1     Sig: Take 1-2 tablets (2-4 mg total) by mouth at bedtime as needed (muscle spascity).     Orders Placed This Encounter   Procedures     MR Lumbar Spine Without Contrast     Standing Status:   Future     Standing Expiration Date:   4/8/2020     Order Specific Question:   Can the procedure be changed per Radiologist protocol?     Answer:   Yes     Order Specific Question:   Is the patient claustrophobic and in need of sedation to complete their MR scan?     Answer:   No     Ambulatory referral to Physical Therapy     Referral Priority:   Routine     Referral Type:   Physical Therapy     Referral Reason:   Evaluation and Treatment     Requested Specialty:   Physical Therapy     Number of Visits Requested:   1       UDT/SWAB:  Patient required a random Urine Drug Testing, due to the need to comply with formerly [facility] Model Policy Guidelines and CDC Guideline for the use of any controlled substances. This is to ensure that patient is compliant with treatment, and monitor for risks such as diversion, abuse, or any other aberrant behaviors. Patient is either being considered for or taking a controlled substance. Unexpected findings will be discussed and treatment decision may be adjusted. Testing is being implemented across the board randomly w/o bias related to age, race, gender, socioeconomic status or [employer] affiliation.    The patient understand todays plan and has their questions answered in regards to expectations and current treatment plan.     SAFETY REMINDERS  No alcohol while taking controlled substances. Alcohol is not an illegal substance, it is unsafe to use in combination. It is a build up of substances in the body that can be  extremely hazardous and may cause respirations to slow to a dangerous rate resulting in hospitalization, brain damage, or death.    Opioid medications have been associated with sharp rise in unintentional overdose and death.  Overdose is a condition characterized by the consumption in excess of a particular drug causing adverse effects. This can happen b/c you are sick, accidentally or intentionally took an extra dose, are on multiple medication that can interact. Someone took your medication and they are not use to the medication.  Symptoms of overdose include:   !breathing slow and shallow, erratic or not at all  !pinpoint pupils, hallucinations  !confusion  !muscle jerks, slack muscles   !extreme sleepiness or loss of alertness   !awake but not able to talk   !face pale or clammy, vomiting, for lighter skinned people, the skin tone turns bluish purple, for darker skinned people, it turns grayish or ashen   If in a situation where overdose is a concern engage the emergency response system (dial 911).    In one study it was noted that 80% of unintentional overdoses occurred in people who were taking a combination of opioids and benzodiazepines.    Do not sell, loan, borrow or share your opioid medication with anyone. Deaths have occurred as a result of this practice. It is illegal and patients are being prosecuted.     Prevent unexpected access/loss of medication: Keep medication locked. Only carry what you need with you.    Millie Weaver, [facility]  [address]  [place] [address]  Ph: [phone]  Fax: [phone]

## 2021-05-28 ENCOUNTER — RECORDS - HEALTHEAST (OUTPATIENT)
Dept: ADMINISTRATIVE | Facility: CLINIC | Age: 76
End: 2021-05-28

## 2021-05-28 NOTE — TELEPHONE ENCOUNTER
Patients MRI shows an increase in retropulsion at the L1 vertebral body after her kyphoplasty at L1 with central spinal stenosis, she recently has had an decrease in her right foot at the ankle and reports falling a lot since the procedure, worse in the past 3-4 weeks. She reports a significant increase in pain that her general opioid regimen is not helping. Patient had gone previously to [place] for the procedure. Will send to back to [place] to spine concerns and further evaluation, patient preference. Notified patients PCP Dr. Donaldson office- who was not in today discussed with covering provider Coleen. Patient agrees with the current plan of care.     Millie Weaver, [facility].

## 2021-05-28 NOTE — TELEPHONE ENCOUNTER
Results are in pt's record. Last follow-up was 4/9/19, is to RTC 4 weeks but hasn't made an appt yet. Please advise re: MRI results

## 2021-05-28 NOTE — TELEPHONE ENCOUNTER
"Patient calls requesting MRI results, also asking about an order for a \"boot for my foot\"  Last ov: 4/9, to follow up in 4 weeks.  Patient does not currently have a follow up apt with provider.  Patient should receive a letter in the mail regarding MRI results    Please attempt to schedule patient for follow up with provider.  "

## 2021-05-29 NOTE — TELEPHONE ENCOUNTER
"Patient left a message this am. Wants to make sure Crystal gets a \"good message\" or wants provider  to call back. Has not been seen for some time, because of been in the hospital. Will be going to assisted living. That will make it harder to get to clinic.    Tried to reach to discuss her message. Got  lmtcb  "

## 2021-05-29 NOTE — TELEPHONE ENCOUNTER
----- Message from Gi Dooley sent at 6/3/2019  3:36 PM CDT -----  Contact: Daughter  General phone call:    Caller: Karolina Hawley  Primary cardiologist: Gail  Detailed reason for call: Wants to get follow up scheduled but next avail is in late July. She is having leg swelling symptoms, what does Dr. Reynolds recommend?  Best phone number: [phone]   Best time to contact: Today  Ok to leave a detailed message? Yes  Device? No    Additional Info:

## 2021-05-29 NOTE — TELEPHONE ENCOUNTER
Return call to patient's daughter Chandan who stated patient has been in TCU since 5-7-19 after having major back surgery and has been experiencing increased KAILEY bilaterally  - patient was seeing lymphedema specialist but sx have not improved - TCU physician ordered BNP which was >4000, CXR and started Lasix.  Chandan verbalized concerns that patient is being discharged from TCU to assisted living facility and has gained up to 28# since surgery.    Informed Chandan that patient can be scheduled in [facility] to address KAILEY and wt gain - instructed her to have [facility] fax results to Dr. Reynolds's attention - understanding verbalized - call transf to sched.  mg

## 2021-05-30 ENCOUNTER — RECORDS - HEALTHEAST (OUTPATIENT)
Dept: ADMINISTRATIVE | Facility: CLINIC | Age: 76
End: 2021-05-30

## 2021-05-30 VITALS — WEIGHT: 103.5 LBS | BODY MASS INDEX: 20.21 KG/M2

## 2021-05-30 VITALS — WEIGHT: 107.1 LBS | BODY MASS INDEX: 20.92 KG/M2

## 2021-05-31 ENCOUNTER — RECORDS - HEALTHEAST (OUTPATIENT)
Dept: ADMINISTRATIVE | Facility: CLINIC | Age: 76
End: 2021-05-31

## 2021-05-31 VITALS — BODY MASS INDEX: 20.22 KG/M2 | WEIGHT: 103 LBS | HEIGHT: 60 IN

## 2021-05-31 VITALS — HEIGHT: 60 IN | WEIGHT: 103 LBS | BODY MASS INDEX: 20.22 KG/M2

## 2021-05-31 VITALS — HEIGHT: 60 IN | BODY MASS INDEX: 20.22 KG/M2 | WEIGHT: 103 LBS

## 2021-06-01 VITALS — WEIGHT: 97 LBS | BODY MASS INDEX: 18.94 KG/M2

## 2021-06-01 VITALS — HEIGHT: 60 IN | BODY MASS INDEX: 19.83 KG/M2 | WEIGHT: 101 LBS

## 2021-06-01 VITALS — WEIGHT: 96 LBS | BODY MASS INDEX: 18.85 KG/M2 | HEIGHT: 60 IN

## 2021-06-01 VITALS — BODY MASS INDEX: 20.22 KG/M2 | WEIGHT: 103 LBS | HEIGHT: 60 IN

## 2021-06-01 VITALS — BODY MASS INDEX: 20.12 KG/M2 | WEIGHT: 102.5 LBS | HEIGHT: 60 IN

## 2021-06-01 VITALS — BODY MASS INDEX: 18.79 KG/M2 | WEIGHT: 96.2 LBS

## 2021-06-01 VITALS — BODY MASS INDEX: 19.85 KG/M2 | WEIGHT: 101.1 LBS | HEIGHT: 60 IN

## 2021-06-01 VITALS — WEIGHT: 97 LBS | HEIGHT: 60 IN | BODY MASS INDEX: 19.04 KG/M2

## 2021-06-01 VITALS — HEIGHT: 60 IN | BODY MASS INDEX: 18.87 KG/M2 | WEIGHT: 96.1 LBS

## 2021-06-01 VITALS — WEIGHT: 97.6 LBS | BODY MASS INDEX: 19.06 KG/M2

## 2021-06-01 VITALS — WEIGHT: 94.6 LBS | BODY MASS INDEX: 18.48 KG/M2

## 2021-06-01 VITALS — WEIGHT: 103 LBS | BODY MASS INDEX: 20.22 KG/M2 | HEIGHT: 60 IN

## 2021-06-01 VITALS — WEIGHT: 96.8 LBS | BODY MASS INDEX: 18.9 KG/M2

## 2021-06-01 VITALS — BODY MASS INDEX: 18.85 KG/M2 | HEIGHT: 60 IN | WEIGHT: 96 LBS

## 2021-06-01 VITALS — WEIGHT: 97.2 LBS | BODY MASS INDEX: 18.98 KG/M2

## 2021-06-01 VITALS — BODY MASS INDEX: 18.94 KG/M2 | WEIGHT: 97 LBS

## 2021-06-01 VITALS — BODY MASS INDEX: 19.14 KG/M2 | WEIGHT: 98 LBS

## 2021-06-01 VITALS — WEIGHT: 96.4 LBS | BODY MASS INDEX: 18.83 KG/M2

## 2021-06-02 ENCOUNTER — RECORDS - HEALTHEAST (OUTPATIENT)
Dept: ADMINISTRATIVE | Facility: CLINIC | Age: 76
End: 2021-06-02

## 2021-06-02 VITALS — WEIGHT: 91 LBS | BODY MASS INDEX: 17.87 KG/M2 | HEIGHT: 60 IN

## 2021-06-02 VITALS — BODY MASS INDEX: 17.87 KG/M2 | BODY MASS INDEX: 17.77 KG/M2 | WEIGHT: 91 LBS | WEIGHT: 91 LBS | HEIGHT: 60 IN

## 2021-06-02 VITALS — WEIGHT: 86 LBS | HEIGHT: 60 IN | BODY MASS INDEX: 16.88 KG/M2

## 2021-06-03 VITALS — BODY MASS INDEX: 20.62 KG/M2 | WEIGHT: 105 LBS | HEIGHT: 60 IN

## 2021-06-08 NOTE — PROGRESS NOTES
Code Status:  FULL CODE  Visit Type: Problem Visit     Facility:  [facility] [[id number]]         Facility Type: SNF (Skilled Nursing Facility, TCU)    History of Present Illness: Minerva Blanco is a 71 y.o. female who I am seeing today on the TCU. Pt with chroniceesophageal perforation. She underwent hiatal hernia repair on 1/2016. She has esophageal leak with stricture and mediastinal abscess. No s/p left thoracotomy, lysis of adhesions, distal esophagectomy, proximal gastrectomy and GT placement, thoracic duct ligation with left esophageal spit fistula and right tube thoracostomy placement. No metaplasia or dysplasia seen. She has been on TF since April 2016. She is having increased diarrhea from the TF and stopped the TF during the night last night. She has Opium tincture which was reinstated last evening. She has chronic pain and is opoid dependent. She has complications post operatively including atrial fib with RVR which improved with lopressor. She continues on Metoprolol and is on amiodarone taper. Chronic anticoagulation with Lovenox. Surgical site infection with bedside I&D.         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     IBS (irritable bowel syndrome) 05/01/2015     GERD (gastroesophageal reflux disease) 05/01/2015     Food impaction of esophagus 05/02/2015     Hx of radiation therapy      Multiple sclerosis      Hyperlipidemia      Essential hypertension 05/02/2015     Perforation of esophagus 01/26/2017     Status post thoracotomy 01/26/2017     Anemia      Fibromyalgia      Malignant neoplasm of right female breast 01/01/2007     Deaf, left      Resolved Ambulatory Problems     Diagnosis Date Noted     Incarcerated hiatal hernia 01/07/2016     Dark stools 01/27/2016     Past Medical History   Diagnosis Date     Breast cancer 2007      Cough, persistent      Esophagus perforation 01/22/2016     Hiatal hernia      History of transfusion      HTN (hypertension) 5/2/2015     Hx antineoplastic chemotherapy 2007     Insomnia      Low back pain radiating to left leg      Meniere disease      Neuropathy      Optic neuritis, right      Paroxysmal atrial fibrillation      Sinus tachycardia        Current Outpatient Prescriptions   Medication Sig     acetaminophen (TYLENOL) 160 mg/5 mL (5 mL) Soln solution 20.3 mL by G-tube route every 6 (six) hours.     AMIODARONE HCL (AMIODARONE, BULK, MISC) 20 mL by G-tube route 2 (two) times a day. 20 mg/ml   Give 20 ml bid for 2 weeks then qd  (Dose 400 mg)     docusate sodium (COLACE) 50 mg/5 mL oral liquid 100 mg by G-tube route 2 (two) times a day.     enoxaparin (LOVENOX) 60 mg/0.6 mL syringe Inject 0.48 mL under the skin every 12 (twelve) hours.     gabapentin (NEURONTIN) 250 mg/5 mL solution 400 mg by G-tube route every 8 (eight) hours.     guar gum (NUTRISOURCE FIBER) Pack powder packet 1 packet by G-tube route 2 (two) times a day.     Lactobacillus rhamnosus GG (CULTURELLE) 10-15 Billion cell capsule Take 1 capsule by mouth bedtime. 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     loperamide (IMODIUM) 1 mg/5 mL solution Take 4 mg by mouth 4 (four) times a day as needed for diarrhea.     melatonin 1 mg Tab tablet 3 mg by G-tube route bedtime as needed.     METOPROLOL SUCCINATE ORAL 50 mg by G-tube route 2 (two) times a day. 10 mg/ml solution     multivit-mins-ferrous gluconat (CERTAVITE-ANTIOXID, IRON GLUC,) 9 mg iron/ 15 mL (15 mL) Liqd Take 10 mL by mouth daily.     oxyCODONE (ROXICODONE) 20 mg/mL concentrated solution Take 30 mg by mouth every 4 (four) hours.     oxyCODONE (ROXICODONE) 5 mg/5 mL solution Take 5 mg by mouth every 4 (four) hours as needed for pain. For breakthrough pain  Take 5 mg for pain 1-5  OR take 10 mg for pain 6-10  [id number] prn     polyethylene glycol (MIRALAX) 17 gram packet 17 g by G-tube route  daily.     simethicone (MYLICON) 40 mg/0.6 mL drops 40 mg by G-tube route every 6 (six) hours as needed (cramping).     sulfamethoxazole-trimethoprim (SEPTRA) 200-40 mg/5 mL suspension Take 10 mL by mouth 2 (two) times a day.     traZODone (DESYREL) 50 MG tablet 100 mg by G-tube route bedtime.      zinc sulfate, bulk, Powd 220 mg by G-tube route daily. 88mg/ml  (2.5 ml  Dose)       Allergies   Allergen Reactions     Amoxicillin Nausea Only     diarrhea     Ativan [Lorazepam]      Hydromorphone      Morphine Itching     Other Environmental Allergy      seasonal       Review of Systems   No fevers or chills. No headache, lightheadedness or dizziness. No SOB, chest pains or palpitations. Chronic fatigue.  Chronic TF with oral pleasure liquids.  No nausea, vomiting, constipation. Chronic diarrhea with increase with change in TF formula she feels. She did turn off her TF last night. Chronic pain. No dysuria, frequency, burning or pain with urination.       Physical Exam   PHYSICAL EXAMINATION:  Vital signs:   Vitals:    01/31/17 1858   BP: 130/66   Pulse: 73   Resp: 18   Temp: 97.3  F (36.3  C)   SpO2: 97%     General: Awake, Alert, oriented x3, appropriately, follows simple commands, conversant  HEENT:PERRLA, Pink conjunctiva, anicteric sclerae, moist oral mucosa. Pale.   NECK: Supple, without any lymphadenopathy, or masses  CVS:  S1  S2, without murmur or gallop. Thoracotomy site with moderate clear liquid in drainage bag.   LUNG: Clear to auscultation, No wheezes, rales or rhonci.  BACK: No kyphosis of the thoracic spine. Incision to left flank region intact. Bluish purplish hue. No redness. No warmth. Increased scar tissue.   ABDOMEN: Soft, nontender to palpation, with positive bowel sounds. GT site dry and intact.   EXTREMITIES: Good range of motion on both upper and lower extremities, 1+ pedal edema, no calf tenderness. Ambulatory without device.   SKIN: Warm and dry, no rashes or erythema noted  NEUROLOGIC:  Intact, pulses palpable, Fatigue.   PSYCHIATRIC: Cognition intact          Labs:  All labs reviewed in the nursing home record.    Assessment/Plan:  1. Perforation of esophagus     2. Status post thoracotomy     3. Esophagus perforation     4. Malignant neoplasm of right female breast     5. Irritable bowel syndrome with diarrhea     6. Chronic diarrhea     7. Chronic pain     8. Anemia (D64.9)     9. Primary osteoarthritis involving multiple joints     10. Fibromyalgia     11. Multiple sclerosis     12. Essential hypertension with goal blood pressure less than 140/90     13. Hyperlipidemia, unspecified hyperlipidemia type     14. Deaf, left       Pt with mx surgeries related to mx perforations. She continues with bag of the esophagus in which drains from pleasure feedings/liquids. She is due for appliance change. She has GT and is receiving TF. Recent change in formula with increasing diarrhea. She does have chronic diarrhea. I spoke with the RD today and she will adjust rate and time of feeding to help reduce diarrhea. She will also add bene fiber. She has lomotil, imodium and opium tincture which she has used in the past. She has chronic pain and is opoid dependent. She has follow up with surgeon in am. I will follow up with laboratory unless drawn at her f/u appt. PT continues in therapy.     45 minutes spent of which greater than 50% was face to face communication with the patient about above plan of care    Electronically signed by: María Umaña CNP

## 2021-06-08 NOTE — PROGRESS NOTES
[facility]    DATE: 2017  NAME: Minerva Blanco  : 1945           MR# [id number]     CODE STATUS:  FULL CODE  VISIT TYPE: Admission  FACILITY: [facility] [[id number]]    ROOM: 317  PRIMARY CARE PROVIDER: Andrea Nino MD Phone: [phone] Fax:[phone]    History of Present Illness:   Minerva Blanco is a 71 y.o. female with A [age]-year-old chronic esophageal perforation as a complication of previous hiatal hernia repair with the more distant history of radiation therapy prior to mastectomy for breast cancer. At this time she has had relatively extensive surgery including a left thoracotomy thoracic duct ligation right tube thoracostomy distal esophagectomy proximal gastrectomy with placement of a spit fistula and postoperative I&D of superficial skin infections. She has a residual inflammation of the incision on her left chest wall anterior laterally, but this has been evaluated by surgery and felt not to warrant an I&D.I meet her for the first time today and find her cognitively intact quite charming and eager to proceed with  Whatever   is necessary to stabilize her. She understands that after a few months she will have more  Surgery and is not particularly enthusiastic about it. At this time she complains of pain at the feeding gastrostomy site and in the left thoracotomy incision with some inflammation of the lower border of the incision but no discrete abscess. There is minimal drainage from the skin edges of the wound but again no discrete abscess or area that would appear to need an incision and drainage. Given the complexity of her treatment in the tenacity of her previous infections I would be quick to refer her  Back to surgery at [facility] if I&D appeared appropriate. As of now will try some warm packs to try to encourage healing Of hyperemia, resulting in resolution in the left anterolateral chest wall,  As well as  hopefully some pain relief. She is on large amounts of narcotic but is wide awake and alert describing very good tolerance for narcotics at this point.    Past Medical History:  Past Medical History   Diagnosis Date     Anemia      Breast cancer 2007     right lumpectomy hx of breast cancer     Cough, persistent      Deaf, left      Esophagus perforation 01/22/2016     Fibromyalgia      GERD (gastroesophageal reflux disease)      Hiatal hernia      History of transfusion      HTN (hypertension) 5/2/2015     Hx antineoplastic chemotherapy 2007     hx of right lumpectomy     Hx of radiation therapy 2007     right breast lumpectomy     Hyperlipidemia      IBS (irritable bowel syndrome)      Insomnia      Low back pain radiating to left leg      with tingling     Meniere disease      Multiple sclerosis      Neuropathy      Optic neuritis, right      related to MS     Paroxysmal atrial fibrillation      Sinus tachycardia        Past Surgical History:  Past Surgical History   Procedure Laterality Date     Thoracotomy Right      for empyema     Wrist surgery Left      Appendectomy       Pelvic laparoscopy       for endometriosis     Cataract extraction Right      Back surgery       Partial resection distal clavicle, debridement, decompression       right shoulder     Esophagogastroduodenoscopy N/A 5/3/2015     Procedure: ESOPHAGOGASTRODUODENOSCOPY;  Surgeon: Rocky Kendall MD;  Location: [facility] GI;  Service:      Breast lumpectomy Right 2007     hx of right lumpectomy     Pr lap,esophagogast fundoplasty N/A 1/7/2016     Procedure: LAPAROSCOPIC HIATAL HERNIA REPAIR-TOUPE PROCEDURE.;  Surgeon: hSon Carroll MD;  Location: [facility] OR;  Service: General     Esophagectomy  01/09/2017     distal, with spit fistula creation     Partial gastrectomy  01/09/2017     Proximal       Allergies:  Allergies   Allergen Reactions     Amoxicillin Nausea Only     diarrhea     Ativan [Lorazepam]      Hydromorphone      Morphine  Itching     Other Environmental Allergy      seasonal       Social History:  Social History     Social History     Marital status:      Spouse name: N/A     Number of children: N/A     Years of education: N/A     Occupational History           Retired     Social History Main Topics     Smoking status: Former Smoker     Packs/day: 1.00     Years: 15.00     Types: Cigarettes     Quit date: 1/1/1998     Smokeless tobacco: Never Used     Alcohol use No     Drug use: No     Sexual activity: Not on file     Other Topics Concern     Not on file     Social History Narrative     (Richard)  2 children  Single level home  1/2017       Family History:  Family History   Problem Relation Age of Onset     Alzheimer's disease Mother      Other Father      cerebral hemorrhage      Ovarian cancer Sister 69     Breast cancer Daughter 50     No Medical Problems Brother      No Medical Problems Brother      No Medical Problems Daughter      BRCA 1/2 Neg Hx      Cancer Neg Hx      Colon cancer Neg Hx      Endometrial cancer Neg Hx        Current Medications:  Current Outpatient Prescriptions   Medication Sig     AMIODARONE HCL (AMIODARONE, BULK, MISC) 20 mL by G-tube route 2 (two) times a day. 20 mg/ml   Give 20 ml bid for 2 weeks then qd  (Dose 400 mg)     docusate sodium (COLACE) 50 mg/5 mL oral liquid 100 mg by G-tube route 2 (two) times a day.     enoxaparin (LOVENOX) 60 mg/0.6 mL syringe Inject 0.48 mL under the skin every 12 (twelve) hours.     gabapentin (NEURONTIN) 250 mg/5 mL solution 400 mg by G-tube route every 8 (eight) hours.     guar gum (NUTRISOURCE FIBER) Pack powder packet 1 packet by G-tube route 2 (two) times a day.     Lactobacillus rhamnosus GG (CULTURELLE) 10-15 Billion cell capsule Take 1 capsule by mouth bedtime. 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     loperamide (IMODIUM) 1 mg/5 mL solution Take 4 mg by mouth 4 (four) times a day as needed for diarrhea.     melatonin 1 mg Tab tablet 3 mg  by G-tube route bedtime as needed.     METOPROLOL SUCCINATE ORAL 50 mg by G-tube route 2 (two) times a day. 10 mg/ml solution     multivit-mins-ferrous gluconat (CERTAVITE-ANTIOXID, IRON GLUC,) 9 mg iron/ 15 mL (15 mL) Liqd Take 10 mL by mouth daily.     oxyCODONE (ROXICODONE) 20 mg/mL concentrated solution Take 30 mg by mouth every 4 (four) hours.     oxyCODONE (ROXICODONE) 5 mg/5 mL solution Take 5 mg by mouth every 4 (four) hours as needed for pain. For breakthrough pain  Take 5 mg for pain 1-5  OR take 10 mg for pain 6-10  [id number] prn     polyethylene glycol (MIRALAX) 17 gram packet 17 g by G-tube route daily.     simethicone (MYLICON) 40 mg/0.6 mL drops 40 mg by G-tube route every 6 (six) hours as needed (cramping).     sulfamethoxazole-trimethoprim (SEPTRA) 200-40 mg/5 mL suspension Take 10 mL by mouth 2 (two) times a day.     traZODone (DESYREL) 50 MG tablet 100 mg by G-tube route bedtime.      zinc sulfate, bulk, Powd 220 mg by G-tube route daily. 88mg/ml  (2.5 ml  Dose)     acetaminophen (TYLENOL) 160 mg/5 mL (5 mL) Soln solution 20.3 mL by G-tube route every 6 (six) hours.       Review of Systems:  History obtained from chart review and the patient  General ROS: positive for  - fatigue  negative for - chills or fever  Psychological ROS: negative for - concentration difficulties, disorientation, hallucinations or memory difficulties  Ophthalmic ROS: negative for - decreased vision, double vision or loss of vision  ENT ROS: negative for - hearing change, nasal discharge or sore throat  Breast ROS: negative for breast lumps  But still some edema in the anterolateral chest wall incision with minimal serous drainage and persistent pain in the area where the eighth Rib was resected  Respiratory ROS: no cough, shortness of breath, or wheezing  Cardiovascular ROS: Chest wall and upper abdominal pain as discussed above related to incision and rib resection  Gastrointestinal ROS: no abdominal pain, change in bowel  habits, or black or bloody stools She has a spit fistula on the left chest wall with free drainage  Genito-Urinary ROS: no dysuria, trouble voiding, or hematuria  Musculoskeletal ROS: No localized weakness or dysfunction  Neurological ROS: no TIA or stroke symptoms  Dermatological ROS: skin as described above several puncture wounds with good healing previous gastrostomy opening almost entirely closed  new left thoracotomy and rib excision slightly edematous minimally reddened slight serous drainage Skin surrounding the fistula connected to the stump of the  Esophagus looks good       Physical Examination:  Visit Vitals     /64     Pulse 74     Temp 97.1  F (36.2  C)     Resp 16     Wt 107 lb 1.6 oz (48.6 kg)     SpO2 96%     BMI 20.92 kg/m2     General appearance: alert, appears stated age, cachectic, cooperative, fatigued and mild distress  Head: Normocephalic, without obvious abnormality, atraumatic  Eyes: conjunctivae/corneas clear. PERRL, EOM's intact. Fundi benign.  Nose: Nares normal. Septum midline. Mucosa normal. No drainage or sinus tenderness.  Throat: lips, mucosa, and tongue normal; teeth and gums normal  Neck: well-healed neck incision  no evidence of inflammation on the left neck  Back: symmetric, no curvature. ROM normal. No CVA tenderness.  Lungs: clear to auscultation bilaterally  Breasts: status post lumpectomy spit fistula is in the medial quadrant of the left breast  Heart: regular rate and rhythm, S1, S2 normal, no murmur, click, rub or gallop  Abdomen: upper abdomen shows healing gastrostomy opening new gastrostomy opening several thoracostomy tube openings all without evidence of infection and mild inflammation of the anterior lateral thorax incision over the resected rib  Extremities: extremities normal, atraumatic, no cyanosis or edema  Pulses: 2+ and symmetric  Skin: as above  Neurologic: Mental status: Alert, oriented, thought content appropriate  Motor:symmetrical strength and  tone       Impression:  Minerva Blanco is a 71 y.o. female with History of esophageal perforation now status post resection of proximal stomach and distal esophagus with creation of a fistula as an interim step until esophago -gastric Reconnection    1. Perforation of esophagus     2. Status post thoracotomy     3. Esophagus perforation     4. Anemia (D64.9)     5. Fibromyalgia     6. Malignant neoplasm of right female breast     7. Hyperlipidemia, unspecified hyperlipidemia type     8. Essential hypertension with goal blood pressure less than 140/90     9. Multiple sclerosis     10. Primary osteoarthritis involving multiple joints     11. Irritable bowel syndrome with diarrhea     12. Deaf, left           Plan: This unfortunately fascinating patient seems to be doing well. I remain concerned about the inflammation noted on the thoracostomy incision although she reports it's unchanged since I&D's on the other incisions earlier in the month. Will continue to observe carefully and use some warm Packs to see if we can localize any abscess.She reports her weight has risen from 86 pounds and although she slightly fluid overloaded now she feels her true weight is probably 101 pounds. She is hopeful that time exercise in the feeding gastrostomy will continue to increase her body fat and her functional reserve prior to any further surgical intervention. I note she is on a large amount of narcotics but quite tolerant of it and am not encouraged to modify her current regimen  Until such time as her recurrent surgical interventions are completed or at least stabilized with resolution of her pain.    Electronically signed by: Anant Harvey Sr., MD

## 2021-06-08 NOTE — PROGRESS NOTES
Pt is requesting an order for a mammogram THORACIC SURGERY FOLLOW UP VISIT    Dear Dr. Carroll,  I saw Mrs. Minerva Blanco in follow-up today. The clinical summary follows:     PREOP DIAGNOSIS   1.  Chronic esophageal perforation with mediastinal phlegmon.    2.  Status post fundoplication.    3.  S/P esophagectomy with esophageal discontinuity     PROCEDURES   1. Toupet fundoplication (1/2016)  2. Multiple endoscopies and pharyngostomy tube placement x 2 (for drainage of paraesophageal cavity)  3. Esophageal stent placement, attempted placement of biliary stent into the paraesophageal cavity (7/22/2016)  4. Esophageal stent removal, placement of retrograde gastroesophageal tube (10/23/2016)  1/9/2017  1.  Laparoscopic proximal gastrectomy and gastrostomy tube placement.    2.  Left thoracotomy with excision of mediastinal phlegmon and distal esophagectomy.    3.  Thoracic duct ligation.      4.  Left esophageal spit fistula.         4/17/2017  1) Laparoscopic retrosternal gastric pull-up, j-tube  2) Partial manubriectomy    INTERVAL STUDIES  CT PE (today): RIGHT pneumonia, small air pocket without major inflammatory response adjacent to esophagogastric anastomosis.    ETOH negative  TOB negative  BMI 19    SUBJECTIVE  Mrs. Blanco is struggling right now. She was doing quite well just prior to discharge, and her pain was well controlled. However, a day after discharge her J-tube was clogged and they had to come to the ER, although it was corrected, it appears to have set her off course. She is currently complaining of fever up to 102.9, IE edema, back pain, fatigue, GERD, and dyspnea. Also, she has a mild amount of drainage from the bottom of her chest incision, but I do not see any evidence of a deep wound infection.    From a personal perspective, she is here with her , Enrique.    IMPRESSION   (Z90.49) H/O esophagectomy  (primary encounter diagnosis)(Z90.49) H/O esophagectomy  (primary encounter diagnosis)  (J18.9) Pneumonia of right lower lobe due  to infectious organism    72 year-old female 2 weeks S/P retrosternal gastric pull-up for end-stage GERD  Pneumonia (RIGHT lower lobe)    PLAN  I reviewed the plan as follows:  Admit to hospital for pneumonia    All questions were answered and the patient and present family were in agreement with the plan.  I appreciate the opportunity to participate in the care of your patient and will keep you updated.  Sincerely,

## 2021-06-11 NOTE — PROGRESS NOTES
Code Status:  FULL CODE  Visit Type: Follow Up (respiratory failure, poor appetite. )     Facility:  [facility] [[id number]]      Facility Type: SNF (Skilled Nursing Facility, TCU)    History of Present Illness:   Hospital Admission Date: 9/9/2020 Hospital Discharge Date: 9/14/2020      Minerva Blanco is a 75 y.o. female who has a past medical history for CAD s/p stent, fibromyalgia, multiple sclerosis, multiple esophageal procedures, GERD, breast CA (s/p right masectomy), Liver disease, chronic lymphedema.  She was recently admitted to [facility] for frequent falls at home with abnormal labs, weakness, malnutrition and FTT.  She had 2 admissions in Aug 2020 for the same issues.     FTT, Malnutrition and Weakness:   -Presumed due to poor po intake and daily Alcohol use.  -She reported to [facility] that drinks 1-2 glasses of Wine every night.    -Recommended f/u with neurology in 2 weeks for possible EMG for weakness.     Hepatic steatosis with transminitis and possible hepatitis vs cirrhosis, abdominal ascites, elevated INR, LFTs, decreased fibrinogen and low albumin.   -PIPPA 0.025  -declined to speak with addiction medicine  -recommend f/u US to look for cirrhosis    Esophageal reflux, partial gastrectomy and multiple esophageal surgeries  -regurgitation  -F/u with GI   -refer to psych for depression that contributes to decreased appetite.     Asymptomatic E.Coli bacteruria and suspected aspiration pneumonia  -found to have lung opacities with dyspnea on exertion   - found to have Ecoli in the urine.  She was started on Cefuroxime thru 9/21/2020.     Macrocytic anemia:   -likely due to alcohol use  -elevated B12 3,829 but thought to be due to liver disease  -recommend f/u SPEP/FLC    Hypokalemia:   -likely due to diarrhea s/p IBS-D and poor po intake  -given immodium    Hypomagnesemia: resolved  Hypophosphatemia and hypocalcemia  Calcium trending up 7.3 to 8. Thought to be due to low magnesemia and decreased PTH  secretion.  She was asymptomatic. Put on phos and KCL replacement.     YLUBOV-resolved.     Peripheral lymphedema:   Venous stasis vs low albumin.  Holding lasix    CAD:   - Decreased amplitude in R wave in V4 concerning for old MI  - F/u with cardiology.     Also, recommended f/u proteinuria    Today, nursing is concerned for ongoing decline.  Upon exam, she is sitting up in her WC and appearing very pale in color.  She reports her breathing is about the same. Respiratory rate is elevated at 28 and she appears labored with extensive conversation.  O2 sats 86% on 2L.  Extremity US were negative for DVTs.  COVID test is negative.  Lung bases are diminished and now she has coarse crackles of upper right lung.  She does have peripheral discoloration of feet of purpling color.  Feet and toes are warm to palpation.  She continues to not eat much and declines the option of feeding tube.  Continues to have lymphedema to all extremites.  Labs drawn today see below.       Past Medical History:   Diagnosis Date     Acute inferolateral myocardial infarction (H) 4/20/2018     LYUBOV (acute kidney injury) (H)      Anemia      Arthritis      Aspiration pneumonia (H)      Breast cancer (H) 2007    right lumpectomy hx of breast cancer     Cough, persistent      Deaf, left      Esophagus perforation 01/22/2016     Fibromyalgia      GERD (gastroesophageal reflux disease)      Hepatic steatosis      Hiatal hernia      History of transfusion      HTN (hypertension) 5/2/2015     Hx antineoplastic chemotherapy 2007    hx of right lumpectomy     Hx of radiation therapy 2007    right breast lumpectomy     Hyperlipidemia      Hypocalcemia      Hypokalemia      Hypomagnesemia      Hypophosphatemia      IBS (irritable bowel syndrome)      Insomnia      Low back pain radiating to left leg     with tingling     Meniere disease      Multiple sclerosis (H)      Neuropathy      Optic neuritis, right     related to MS     Paroxysmal atrial fibrillation  (H)      Sinus tachycardia      Past Surgical History:   Procedure Laterality Date     APPENDECTOMY       BACK SURGERY       BREAST LUMPECTOMY Right 2007    hx of right lumpectomy     CATARACT EXTRACTION Right      CV CORONARY ANGIOGRAM N/A 4/20/2018    Procedure: Coronary Angiogram;  Surgeon: Hudson Atkinson MD;  Location: [facility] Cath Lab;  Service:      CV LEFT HEART CATHETERIZATION WITH LEFT VENTRICULOGRAM N/A 4/20/2018    Procedure: Left Heart Catheterization with Left Ventriculogram;  Surgeon: Hudson Atkinson MD;  Location: [facility] Cath Lab;  Service:      ESOPHAGECTOMY  01/09/2017    distal, with spit fistula creation     ESOPHAGOGASTRODUODENOSCOPY N/A 5/3/2015    Procedure: ESOPHAGOGASTRODUODENOSCOPY;  Surgeon: Rocky Kendall MD;  Location: [facility];  Service:      EYE SURGERY       FRACTURE SURGERY       HERNIA REPAIR       PARTIAL GASTRECTOMY  01/09/2017    Proximal     partial resection distal clavicle, debridement, decompression      right shoulder     PELVIC LAPAROSCOPY      for endometriosis     [place] LAP,ESOPHAGOGAST FUNDOPLASTY N/A 1/7/2016    Procedure: LAPAROSCOPIC HIATAL HERNIA REPAIR-TOUPE PROCEDURE.;  Surgeon: Shon Carroll MD;  Location: [facility] OR;  Service: General     SPINE SURGERY       THORACOTOMY Right     for empyema     WRIST SURGERY Left      Family History   Problem Relation Age of Onset     Alzheimer's disease Mother      Other Father         cerebral hemorrhage      Ovarian cancer Sister 69     Breast cancer Daughter 50     No Medical Problems Brother      No Medical Problems Brother      No Medical Problems Daughter      BRCA 1/2 Neg Hx      Cancer Neg Hx      Colon cancer Neg Hx      Endometrial cancer Neg Hx      Social History     Socioeconomic History     Marital status:      Spouse name: Not on file     Number of children: Not on file     Years of education: Not on file     Highest education level: Not on file   Occupational History      Occupation:      Comment: Retired   Social Needs     Financial resource strain: Not on file     Food insecurity     Worry: Not on file     Inability: Not on file     Transportation needs     Medical: Not on file     Non-medical: Not on file   Tobacco Use     Smoking status: Former Smoker     Packs/day: 1.00     Years: 15.00     Pack years: 15.00     Types: Cigarettes     Last attempt to quit: 1998     Years since quittin.7     Smokeless tobacco: Never Used   Substance and Sexual Activity     Alcohol use: No     Drug use: No     Sexual activity: Not on file   Lifestyle     Physical activity     Days per week: Not on file     Minutes per session: Not on file     Stress: Not on file   Relationships     Social connections     Talks on phone: Not on file     Gets together: Not on file     Attends Taoist service: Not on file     Active member of club or organization: Not on file     Attends meetings of clubs or organizations: Not on file     Relationship status: Not on file     Intimate partner violence     Fear of current or ex partner: Not on file     Emotionally abused: Not on file     Physically abused: Not on file     Forced sexual activity: Not on file   Other Topics Concern     Not on file   Social History Narrative     (Richard)  2 children  Single level home  2017       Current Outpatient Medications   Medication Sig Dispense Refill     acetaminophen (TYLENOL) 325 MG tablet Take 650 mg by mouth 2 (two) times a day as needed for pain (at least 4 hours apart).       albuterol (PROAIR HFA;PROVENTIL HFA;VENTOLIN HFA) 90 mcg/actuation inhaler Inhale 2 puffs every 4 (four) hours as needed for shortness of breath.       gabapentin (NEURONTIN) 100 MG capsule Take 100 mg by mouth at bedtime.       Lactobacillus acidophilus 100 mg (1 billion cell) cap Take 1 capsule by mouth 2 (two) times a day.       melatonin 3 mg Tab tablet Take 3 mg by mouth at bedtime as needed.       metoprolol  succinate (TOPROL-XL) 25 MG Take 1 tablet (25 mg total) by mouth daily. (Patient taking differently: Take 12.5 mg by mouth daily. )  0     omeprazole (PRILOSEC) 20 MG capsule Take 20 mg by mouth 3 (three) times a day. May take an additional dose x 1 daily for reflux symptoms       polyvinyl alcohol (LIQUIFILM TEARS) 1.4 % ophthalmic solution Administer 2 drops to both eyes every 4 (four) hours as needed for dry eyes.       potassium chloride (KLOR-CON) 20 mEq packet Take 20 mEq by mouth 2 (two) times a day.       potassium phos-sodium phos (K-PHOS NEUTRAL) 250 mg Tab tablet Take 1 tablet by mouth daily.       thiamine 100 MG tablet Take 100 mg by mouth daily.       tiZANidine (ZANAFLEX) 4 MG tablet Take 4 mg by mouth at bedtime.       traZODone (DESYREL) 100 MG tablet Take 100 mg by mouth at bedtime.              No current facility-administered medications for this visit.      Allergies   Allergen Reactions     Fentanyl      Other reaction(s): Edema           Amoxicillin Nausea Only     diarrhea     Ativan [Lorazepam] Other (See Comments)     Hallucinations     Morphine Itching     Other Environmental Allergy      seasonal     Immunization History   Administered Date(s) Administered     Influenza, inj, historic,unspecified 11/17/2015       Review of Systems   Patient denies fever, chills, headache, lightheadedness, dizziness, chest pain, palpitations, abdominal pain, n/v,  constipation,dysuria, frequency, burning or pain with urination.  Other than stated in HPI all other review of systems is negative.         Physical Exam   In order to maintain social distancing during the COVID pandemic, a visual exam was completed.     Vitals: /96, HR 92, resp 28, temp 86%    Patient is well nourished and no acute distress.  Head is AT/NC, EOM intact. Respiratory effort normal. No visible LE edema. Alert and oriented, face symmetric. No visible skin issues or rashes. Mood euthymic              Labs:    Recent Results  (from the past 240 hour(s))   Comprehensive Metabolic Panel   Result Value Ref Range    Sodium 141 136 - 145 mmol/L    Potassium 3.5 3.5 - 5.0 mmol/L    Chloride 104 98 - 107 mmol/L    CO2 27 22 - 31 mmol/L    Anion Gap, Calculation 10 5 - 18 mmol/L    Glucose 53 (LL) 70 - 125 mg/dL    BUN 6 (L) 8 - 28 mg/dL    Creatinine 0.48 (L) 0.60 - 1.10 mg/dL    GFR MDRD Af Amer >60 >60 mL/min/1.73m2    GFR MDRD Non Af Amer >60 >60 mL/min/1.73m2    Bilirubin, Total 2.1 (H) 0.0 - 1.0 mg/dL    Calcium 6.8 (L) 8.5 - 10.5 mg/dL    Protein, Total 4.3 (L) 6.0 - 8.0 g/dL    Albumin 1.5 (L) 3.5 - 5.0 g/dL    Alkaline Phosphatase 249 (H) 45 - 120 U/L    AST 59 (H) 0 - 40 U/L    ALT 28 0 - 45 U/L   Prealbumin   Result Value Ref Range    Prealbumin 5.1 (L) 19.0 - 38.0 mg/dL   INR   Result Value Ref Range    INR 1.33 (H) 0.90 - 1.10   HM1 (CBC with Diff)   Result Value Ref Range    WBC 3.4 (L) 4.0 - 11.0 thou/uL    RBC 2.34 (L) 3.80 - 5.40 mill/uL    Hemoglobin 9.2 (L) 12.0 - 16.0 g/dL    Hematocrit 28.1 (L) 35.0 - 47.0 %     (H) 80 - 100 fL    MCH 39.3 (H) 27.0 - 34.0 pg    MCHC 32.7 32.0 - 36.0 g/dL    RDW 15.8 (H) 11.0 - 14.5 %    Platelets 179 140 - 440 thou/uL    MPV 11.6 8.5 - 12.5 fL   Manual Differential   Result Value Ref Range    Total Neutrophils % 56 50 - 70 %    Lymphocytes % 24 20 - 40 %    Monocytes % 11 (H) 2 - 10 %    Eosinophils %  9 (H) 0 - 6 %    Basophils % 1 0 - 2 %    Immature Granulocyte % - Manual 0 <=0 %    Total Neutrophils Absolute 1.9 (L) 2.0 - 7.7 thou/ul    Lymphocytes Absolute 0.8 0.8 - 4.4 thou/uL    Monocytes Absolute 0.4 0.0 - 0.9 thou/uL    Eosinophils Absolute 0.3 0.0 - 0.4 thou/uL    Basophils Absolute 0.0 0.0 - 0.2 thou/uL    Immature Granulocyte Absolute - Manual 0.0 <=0.0 thou/uL    Platelet Estimate Normal Normal    Polychromasia 1+ (!) Negative    Target Cells 3+ (!) Negative   COVID-19 Virus PCR MRF    Specimen: Respiratory   Result Value Ref Range    COVID-19 VIRUS SPECIMEN SOURCE  Nasopharyngeal     2019-nCOV Not Detected    C. Diff Toxin By PCR    Specimen: Stool   Result Value Ref Range    C.Difficile Toxigenic by PCR Negative Negative    Ribotype 027/NAP1/B1 Presumptive Negative Presumptive Negative   CK Total   Result Value Ref Range    CK, Total 29 (L) 30 - 190 U/L   Comprehensive Metabolic Panel   Result Value Ref Range    Sodium 136 136 - 145 mmol/L    Potassium 4.0 3.5 - 5.0 mmol/L    Chloride 105 98 - 107 mmol/L    CO2 25 22 - 31 mmol/L    Anion Gap, Calculation 6 5 - 18 mmol/L    Glucose 60 (L) 70 - 125 mg/dL    BUN 4 (L) 8 - 28 mg/dL    Creatinine 0.50 (L) 0.60 - 1.10 mg/dL    GFR MDRD Af Amer >60 >60 mL/min/1.73m2    GFR MDRD Non Af Amer >60 >60 mL/min/1.73m2    Bilirubin, Total 1.5 (H) 0.0 - 1.0 mg/dL    Calcium 7.0 (L) 8.5 - 10.5 mg/dL    Protein, Total 4.3 (L) 6.0 - 8.0 g/dL    Albumin 1.3 (L) 3.5 - 5.0 g/dL    Alkaline Phosphatase 203 (H) 45 - 120 U/L    AST 51 (H) 0 - 40 U/L    ALT 22 0 - 45 U/L   Magnesium   Result Value Ref Range    Magnesium 1.3 (L) 1.8 - 2.6 mg/dL   Phosphorus   Result Value Ref Range    Phosphorus 2.5 2.5 - 4.5 mg/dL   HM2(CBC w/o Differential)   Result Value Ref Range    WBC 3.8 (L) 4.0 - 11.0 thou/uL    RBC 2.32 (L) 3.80 - 5.40 mill/uL    Hemoglobin 9.2 (L) 12.0 - 16.0 g/dL    Hematocrit 28.4 (L) 35.0 - 47.0 %     (H) 80 - 100 fL    MCH 39.7 (H) 27.0 - 34.0 pg    MCHC 32.4 32.0 - 36.0 g/dL    RDW 14.9 (H) 11.0 - 14.5 %    Platelets 211 140 - 440 thou/uL    MPV 11.8 8.5 - 12.5 fL   INR   Result Value Ref Range    INR 1.45 (H) 0.90 - 1.10         Assessment:  1. Pneumonia of left lower lobe due to infectious organism     2. Acute respiratory failure with hypoxia (H)     3. Severe malnutrition (H)     4. Depression, unspecified depression type         Plan:   At this time, I recommend that patient be seen in the hospital setting due to no improvement with antibiotics.  We are unable to neb in this facility and so I would recommend hospitalization  "for respiratory diagnostics and treatment.  I did discuss goals of care and the patient is agreeable to hospitalization however she is concerned that she will go to the ER and be seen and be sent right back.  I did ask her what her health care goal would be if her condition was deemed poor with poor progress and she states she would then consider Hospice \"I guess\".     Ultimately, after conversation she is agreeable to be seen in the ER and thus she was sent to [facility] per her request.     At the end of the discussion, She states she does not want to take the Lexapro and would like to go back on the mirtazapine for which she was taking before.  She is unsure of why that was discontinued.  I will dc lexapro for now and if she returns consider restarting Mirtazapine.       Electronically signed by: Leonela Regan CNP     "

## 2021-06-11 NOTE — PROGRESS NOTES
Code Status:  FULL CODE  Visit Type: Review Of Multiple Medical Conditions (failure to thrive, malnutrition, hypokalemia, hypophosphotemia, hypocalcemia, falls at home weakness, Alcohol disorder, Hepatic steatosis, GERD, Ecoli UTI, macrocytic anemia, MS, and aspiration pneumonia and acute hypoxia. )     Facility:  [facility] [[id number]]      Facility Type: SNF (Skilled Nursing Facility, TCU)    History of Present Illness:   Hospital Admission Date: 9/9/2020 Hospital Discharge Date: 9/14/2020      Minerva Blanco is a 75 y.o. female who has a past medical history for CAD s/p stent, fibromyalgia, multiple sclerosis, multiple esophageal procedures, GERD, breast CA (s/p right masectomy), Liver disease, chronic lymphedema.  She was recently admitted to [facility] for frequent falls at home with abnormal labs, weakness, malnutrition and FTT.  She had 2 admissions in Aug 2020 for the same issues.     FTT, Malnutrition and Weakness:   -Presumed due to poor po intake and daily Alcohol use.  -She reported to [facility] that drinks 1-2 glasses of Wine every night.    -Recommended f/u with neurology in 2 weeks for possible EMG for weakness.     Hepatic steatosis with transminitis and possible hepatitis vs cirrhosis, abdominal ascites, elevated INR, LFTs, decreased fibrinogen and low albumin.   -PIPPA 0.025  -declined to speak with addiction medicine  -recommend f/u US to look for cirrhosis    Esophageal reflux, partial gastrectomy and multiple esophageal surgeries  -regurgitation  -F/u with GI   -refer to psych for depression that contributes to decreased appetite.     Asymptomatic E.Coli bacteruria and suspected aspiration pneumonia  -found to have lung opacities with dyspnea on exertion   - found to have Ecoli in the urine.  She was started on Cefuroxime thru 9/21/2020.     Macrocytic anemia:   -likely due to alcohol use  -elevated B12 3,829 but thought to be due to liver disease  -recommend f/u SPEP/FLC    Hypokalemia:   -likely  due to diarrhea s/p IBS-D and poor po intake  -given immodium    Hypomagnesemia: resolved  Hypophosphatemia and hypocalcemia  Calcium trending up 7.3 to 8. Thought to be due to low magnesemia and decreased PTH secretion.  She was asymptomatic. Put on phos and KCL replacement.     LYUBOV-resolved.     Peripheral lymphedema:   Venous stasis vs low albumin.  Holding lasix    CAD:   - Decreased amplitude in R wave in V4 concerning for old MI  - F/u with cardiology.     Also, recommended f/u proteinuria    Today, I follow up with her after nursing called yesterday stating she became hypoxic and required NC O2 to maintain sats greater than 90%.  She is on 2L and satting 91%. LS are diminished in the bases and she has shallow breathing without accessory muscle use.  We are awaiting COVID-19 testing.     She tells me that she does have constant burping and regurgitation due to no esophageal sphincter after surgeries.  She also reports that she was drinking 1 glass of wine.  She does not recall any recommendations of avoiding alcohol.  I counseled her in avoiding alcohol due to Liver disease and reflux. She reports that she was told to take Omeprazole three times a day with meals and then prn x 1 daily for bad episodes at night.  She sees a GI provider at [facility].     Doxepin was started in June and is requesting that this be changed to a different med as it causes her to be too sleepy.  She was on cymbalta in the past but does not recall why this was changed.  She endorses a low mood and feelings of hopelessness.     She has peripheral 4+ pitting edema of BLE and soft nonpitting edema of BUE.  She states she has had issues with this since her back surgeries. She is typically on lasix which is being held at this time.     She endorses watery diarrhea, (has chronic diarrhea with IBS) but states it has worsened since starting antibiotics.       Past Medical History:   Diagnosis Date     Acute inferolateral  myocardial infarction (H) 4/20/2018     LYUBOV (acute kidney injury) (H)      Anemia      Arthritis      Aspiration pneumonia (H)      Breast cancer (H) 2007    right lumpectomy hx of breast cancer     Cough, persistent      Deaf, left      Esophagus perforation 01/22/2016     Fibromyalgia      GERD (gastroesophageal reflux disease)      Hepatic steatosis      Hiatal hernia      History of transfusion      HTN (hypertension) 5/2/2015     Hx antineoplastic chemotherapy 2007    hx of right lumpectomy     Hx of radiation therapy 2007    right breast lumpectomy     Hyperlipidemia      Hypocalcemia      Hypokalemia      Hypomagnesemia      Hypophosphatemia      IBS (irritable bowel syndrome)      Insomnia      Low back pain radiating to left leg     with tingling     Meniere disease      Multiple sclerosis (H)      Neuropathy      Optic neuritis, right     related to MS     Paroxysmal atrial fibrillation (H)      Sinus tachycardia      Past Surgical History:   Procedure Laterality Date     APPENDECTOMY       BACK SURGERY       BREAST LUMPECTOMY Right 2007    hx of right lumpectomy     CATARACT EXTRACTION Right      CV CORONARY ANGIOGRAM N/A 4/20/2018    Procedure: Coronary Angiogram;  Surgeon: Hudson Atkinson MD;  Location: [facility];  Service:      CV LEFT HEART CATHETERIZATION WITH LEFT VENTRICULOGRAM N/A 4/20/2018    Procedure: Left Heart Catheterization with Left Ventriculogram;  Surgeon: Hudson Atkinson MD;  Location: [facility];  Service:      ESOPHAGECTOMY  01/09/2017    distal, with spit fistula creation     ESOPHAGOGASTRODUODENOSCOPY N/A 5/3/2015    Procedure: ESOPHAGOGASTRODUODENOSCOPY;  Surgeon: Rocky Kendall MD;  Location: [facility];  Service:      EYE SURGERY       FRACTURE SURGERY       HERNIA REPAIR       PARTIAL GASTRECTOMY  01/09/2017    Proximal     partial resection distal clavicle, debridement, decompression      right shoulder     PELVIC LAPAROSCOPY      for endometriosis      AK LAP,ESOPHAGOGAST FUNDOPLASTY N/A 2016    Procedure: LAPAROSCOPIC HIATAL HERNIA REPAIR-TOUPE PROCEDURE.;  Surgeon: Shon Carroll MD;  Location: [facility];  Service: General     SPINE SURGERY       THORACOTOMY Right     for empyema     WRIST SURGERY Left      Family History   Problem Relation Age of Onset     Alzheimer's disease Mother      Other Father         cerebral hemorrhage      Ovarian cancer Sister 69     Breast cancer Daughter 50     No Medical Problems Brother      No Medical Problems Brother      No Medical Problems Daughter      BRCA /2 Neg Hx      Cancer Neg Hx      Colon cancer Neg Hx      Endometrial cancer Neg Hx      Social History     Socioeconomic History     Marital status:      Spouse name: Not on file     Number of children: Not on file     Years of education: Not on file     Highest education level: Not on file   Occupational History     Occupation:      Comment: Retired   Social Needs     Financial resource strain: Not on file     Food insecurity     Worry: Not on file     Inability: Not on file     Transportation needs     Medical: Not on file     Non-medical: Not on file   Tobacco Use     Smoking status: Former Smoker     Packs/day: 1.00     Years: 15.00     Pack years: 15.00     Types: Cigarettes     Last attempt to quit: 1998     Years since quittin.7     Smokeless tobacco: Never Used   Substance and Sexual Activity     Alcohol use: No     Drug use: No     Sexual activity: Not on file   Lifestyle     Physical activity     Days per week: Not on file     Minutes per session: Not on file     Stress: Not on file   Relationships     Social connections     Talks on phone: Not on file     Gets together: Not on file     Attends [employer] service: Not on file     Active member of club or organization: Not on file     Attends meetings of clubs or organizations: Not on file     Relationship status: Not on file     Intimate partner violence      Fear of current or ex partner: Not on file     Emotionally abused: Not on file     Physically abused: Not on file     Forced sexual activity: Not on file   Other Topics Concern     Not on file   Social History Narrative     (Richard)  2 children  Single level home  1/2017       Current Outpatient Medications   Medication Sig Dispense Refill     acetaminophen (TYLENOL) 325 MG tablet Take 650 mg by mouth 2 (two) times a day as needed for pain (at least 4 hours apart).       albuterol (PROAIR HFA;PROVENTIL HFA;VENTOLIN HFA) 90 mcg/actuation inhaler Inhale 2 puffs every 4 (four) hours as needed for shortness of breath.       gabapentin (NEURONTIN) 100 MG capsule Take 100 mg by mouth at bedtime.       Lactobacillus acidophilus 100 mg (1 billion cell) cap Take 1 capsule by mouth 2 (two) times a day.       melatonin 3 mg Tab tablet Take 3 mg by mouth at bedtime as needed.       metoprolol succinate (TOPROL-XL) 25 MG Take 1 tablet (25 mg total) by mouth daily. (Patient taking differently: Take 12.5 mg by mouth daily. )  0     polyvinyl alcohol (LIQUIFILM TEARS) 1.4 % ophthalmic solution Administer 2 drops to both eyes every 4 (four) hours as needed for dry eyes.       potassium chloride (KLOR-CON) 20 mEq packet Take 20 mEq by mouth 2 (two) times a day.       potassium phos-sodium phos (K-PHOS NEUTRAL) 250 mg Tab tablet Take 1 tablet by mouth daily.       thiamine 100 MG tablet Take 100 mg by mouth daily.       tiZANidine (ZANAFLEX) 4 MG tablet Take 4 mg by mouth at bedtime.       venlafaxine (EFFEXOR-XR) 37.5 MG 24 hr capsule Take 37.5 mg by mouth daily.       omeprazole (PRILOSEC) 20 MG capsule Take 20 mg by mouth 3 (three) times a day. May take an additional dose x 1 daily for reflux symptoms       traZODone (DESYREL) 100 MG tablet Take 100 mg by mouth at bedtime.              No current facility-administered medications for this visit.      Allergies   Allergen Reactions     Fentanyl      Other reaction(s):  Edema           Amoxicillin Nausea Only     diarrhea     Ativan [Lorazepam] Other (See Comments)     Hallucinations     Morphine Itching     Other Environmental Allergy      seasonal     Immunization History   Administered Date(s) Administered     Influenza, inj, historic,unspecified 11/17/2015       Post Discharge Medication Reconciliation Status: discharge medications reconciled and changed, per note/orders    Review of Systems   Patient denies fever, chills, headache, lightheadedness, dizziness, chest pain, palpitations, abdominal pain, n/v,  constipation, change in appetite, dysuria, frequency, burning or pain with urination.  Other than stated in HPI all other review of systems is negative.         Physical Exam   Vital signs: /74, HR 1010, resp 16, temp 98.2  GENERAL APPEARANCE: Thin, cachetic, frail elderly woman in no acute distress  HEENT: normocephalic, atraumatic  sclerae anicteric, conjunctivae clear and moist, EOM intact, dry oral mucosa. Esophageal convulsion due to reflux  LUNGS: diminished in the bases,  no adventitious sounds, respiratory effort normal.  CARD: RRR, S1, S2, without murmurs, gallops, rubs  ABD: Soft, nondistended and nontender with normal bowel sounds.   EXTREMITIES: 4+ pitting edema to BLE and soft nonpitting to BUE  NEURO: Alert and oriented x 3.  Face is symmetric.  SKIN: dry skin   PSYCH: flat            Labs:    Recent Results (from the past 240 hour(s))   COVID-19 VIRUS (CORONAVIRUS) BY PCR - EXTERNAL RESULT    Specimen: Nasopharyngeal Swab    Specimen type and source: Nasopharyngeal Swab, Nasopharyngeal Swab   Result Value Ref Range    COVID-19 Virus by PCR (External Result) Not Detected Not Detected   Comprehensive Metabolic Panel   Result Value Ref Range    Sodium 141 136 - 145 mmol/L    Potassium 3.5 3.5 - 5.0 mmol/L    Chloride 104 98 - 107 mmol/L    CO2 27 22 - 31 mmol/L    Anion Gap, Calculation 10 5 - 18 mmol/L    Glucose 53 (LL) 70 - 125 mg/dL    BUN 6 (L) 8 - 28  mg/dL    Creatinine 0.48 (L) 0.60 - 1.10 mg/dL    GFR MDRD Af Amer >60 >60 mL/min/1.73m2    GFR MDRD Non Af Amer >60 >60 mL/min/1.73m2    Bilirubin, Total 2.1 (H) 0.0 - 1.0 mg/dL    Calcium 6.8 (L) 8.5 - 10.5 mg/dL    Protein, Total 4.3 (L) 6.0 - 8.0 g/dL    Albumin 1.5 (L) 3.5 - 5.0 g/dL    Alkaline Phosphatase 249 (H) 45 - 120 U/L    AST 59 (H) 0 - 40 U/L    ALT 28 0 - 45 U/L   Prealbumin   Result Value Ref Range    Prealbumin 5.1 (L) 19.0 - 38.0 mg/dL   INR   Result Value Ref Range    INR 1.33 (H) 0.90 - 1.10   HM1 (CBC with Diff)   Result Value Ref Range    WBC 3.4 (L) 4.0 - 11.0 thou/uL    RBC 2.34 (L) 3.80 - 5.40 mill/uL    Hemoglobin 9.2 (L) 12.0 - 16.0 g/dL    Hematocrit 28.1 (L) 35.0 - 47.0 %     (H) 80 - 100 fL    MCH 39.3 (H) 27.0 - 34.0 pg    MCHC 32.7 32.0 - 36.0 g/dL    RDW 15.8 (H) 11.0 - 14.5 %    Platelets 179 140 - 440 thou/uL    MPV 11.6 8.5 - 12.5 fL   Manual Differential   Result Value Ref Range    Total Neutrophils % 56 50 - 70 %    Lymphocytes % 24 20 - 40 %    Monocytes % 11 (H) 2 - 10 %    Eosinophils %  9 (H) 0 - 6 %    Basophils % 1 0 - 2 %    Immature Granulocyte % - Manual 0 <=0 %    Total Neutrophils Absolute 1.9 (L) 2.0 - 7.7 thou/ul    Lymphocytes Absolute 0.8 0.8 - 4.4 thou/uL    Monocytes Absolute 0.4 0.0 - 0.9 thou/uL    Eosinophils Absolute 0.3 0.0 - 0.4 thou/uL    Basophils Absolute 0.0 0.0 - 0.2 thou/uL    Immature Granulocyte Absolute - Manual 0.0 <=0.0 thou/uL    Platelet Estimate Normal Normal    Polychromasia 1+ (!) Negative    Target Cells 3+ (!) Negative         Assessment:  1. Pneumonia of left lower lobe due to infectious organism     2. Severe malnutrition (H)     3. Failure to thrive in adult     4. Weakness     5. Multiple sclerosis (H)     6. Lymphedema     7. Hepatic steatosis     8. Depression, unspecified depression type     9. Coronary artery disease involving native heart without angina pectoris, unspecified vessel or lesion type     10. Asymptomatic  bacteriuria         Plan:   Pneumonia: chest xray today showed infiltrate in LLL.  Will add Flagyl to Ceftin already ordered.  ST to assess swallowing.      Severe malnutrition: Boost Breeze three times a day, Dietician to follow and make recommendations. Continue on Kphos and KCL.  Monitor labs. Albumin 1.5 and prealbumin 5.1    FTT: may be difficult to impact due to extent of her illness.  Will address nutrition and depression.  Would recommend psychiatry follow up.     Weakness: ? If related to MS however neurology didn't think she was flaring.  Will work with therapies and have her follow up with neurology in 2 weeks.     MS: no medications, follow up with neurology.     Lymphedema: likely related to low albumin.  Will not restart Lasix at this time due to electrolytes issues.  MESHA hose to LE daily.    Hepatic steatosis: recommend follow up with GI for Liver speciality. LFTs and INR are improved from hospitalization.     Depression: large component to her health.  She can't tell me why she was put on Doxepin.  Will discontinue and start Effexor XR at low dose due to Liver issues.     CAD: no chest pain, no ASA or statin likely due to liver issues. Follow up with cardiology.     Bacteriuria: continues to be asymptomatic. Continue with ceftin.     Macrocytic anemia: hgb improving.  Continue with folic acid and thiamine.  Nutrition is important here.     40 total minutes with 30 minutes spent with patient in counseling regarding alcohol use in her current state. Medication review and teaching. Counseling regarding hospitalization events.       Electronically signed by: Leonela Regan CNP

## 2021-06-11 NOTE — PROGRESS NOTES
[facility]    Facility:  [facility] [[id number]]    Code Status:  FULL CODE    Chief Complaint   Patient presents with     H & P   :                    Patient Active Problem List   Diagnosis     Primary osteoarthritis involving multiple joints     Bursitis Of The Hip     Congenital anomaly of lumbar spine     IBS (irritable bowel syndrome)     GERD (gastroesophageal reflux disease)     Food impaction of esophagus     Hx of radiation therapy     Multiple sclerosis (H)     Hyperlipidemia, unspecified hyperlipidemia type     Essential hypertension     Perforation of esophagus     Status post thoracotomy     Anemia     Fibromyalgia     Malignant neoplasm of right female breast (H)     Deaf, left     ST elevation myocardial infarction (STEMI), unspecified artery (H)     Coronary artery disease     CHF (congestive heart failure), NYHA class I, acute on chronic, combined (H)     Bilateral lower extremity edema     Moderate protein-calorie malnutrition (H)     Gross hematuria     Anemia due to blood loss, acute     Delayed surgical wound healing, initial encounter     Anasarca       History:  Minerva Blanco  is a 75 year old female with complex past medical history including but not limited to:  - Severe GERD leading to Nissen fundoplication 2016 complicated by postoperative infection with prolonged hospital stays at the [facility] under the care of Dr. Rivera for antibiotic therapy, I&D operations which she says lasted a few months.  It was eventually recommended that she undergo surgical division of her esophagus from stomach with partial resection of each.  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  She has ongoing severe reflux  but has been back eating for some time now.  Patient is on high-dose PPI 3-4 times daily  - Varying levels of moderate to severe protein malnutrition.  She has been hospitalized 3 times in the last 2 months.  Tube feeding has been recommended but she is unwilling to consider it based on her history of understanding it so well from the 3-year she spent with it.  Patient has had intermittent and recent severe hypoalbuminemia and electrolyte deficiencies and hypoglycemia history  -Varying levels of dysphasia/esophageal dysmotility over the years.    Meanwhile she has had a myriad of other health problems including coronary artery disease/inferior lateral MI with stenting and April 2018, chronic diastolic heart failure, MS which has been quiescent, peripheral neuropathy, alcohol use of concern to medical professionals, varying degrees of edema often severe, chronic cholelithiasis and chronic bile duct dilation with negative MRCP 2019 recent GI follow-up in June 2020 recommending no further GI work-up for the bile duct dilation, complicated lumbar fractures including two-stage vertebroplasty procedure with associated radiculopathy and foot drop after which she spent months in the TCU in [facility], lumbosacral disc disease, urinary retention, breast cancer with right lumpectomy and lymph node dissection, left ear deafness, hepatic steatosis, hyperlipidemia, Ménière's disease, fibromyalgia hypertension, blood transfusions, paroxysmal atrial fibrillation (not on anticoagulation)       seen for admission to TCU on 9/18/2020    Hospital Course: Patient has 3 recent hospitalizations:  1.  [facility] August 1 through August 15 for failure to thrive weakness falls.  She had negative TTE negative head CT, electrolyte replacement.  Feeding tube was recommended and again refused.  2.  Patient was hospitalized [facility] August 17 through August 21 for failure to thrive.  She had extensive work-up at this time with no malignancy  found.  She had negative CT chest abdomen pelvis negative TTE, negative ceruloplasmin for Elian's disease, normal B12 and folate levels negative peripheral smear.  Her doxepin was discontinued at that time tizanidine was discontinued she had new methocarbamol new mirtazapine at that time.  She did not stick with those medication changes.  3.  Most recently hospitalized September 9 through September 14 at [facility] for failure to thrive, hypotension, frequent falls.  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  She was seen by neurology who did not feel this represented a flare of multiple sclerosis.  Malnutrition was felt to be another major problem contributing to the symptoms.  She was seen by PT, OT social service and TCU was recommended  - Blood alcohol level 0.025 acetone 11.  Elevated LFTs, ascites concerning for possible cirrhosis although cirrhosis has not been officially diagnosed.  She was noted to have coagulopathy but was not felt to have DIC.  -Severe reflux was not further evaluated but treated with Prilosec 20 milligrams 4 times daily 1/2-hour before meals and at bedtime  - She was diagnosed with aspiration pneumonia based on abnormal chest x-ray with upper lobe infiltrates and new hypoxemia.  She was treated with cefuroxime and weaned off oxygen by discharge with recommendation for follow-up chest x-ray in about 6 weeks  -Patient had E. coli in urine of uncertain significance but covered by cefuroxime  - Macrocytosis noted with SPEP checked and pending FLC pending.  Her B12 was elevated with this admission though it has been recently normal at the [facility].  Thus it is not likely a marker for malignancy in this case.  She was also noted to have elevated CPK of uncertain significance.  -Severe electrolyte disturbance with need  "for replacement of potassium phosphorus and magnesium  -Acute kidney injury with improved by discharge  -Depression       TCU course: Patient seen by  yesterday who was concerned about her new oxygen need.  About 3 days ago at the TCU she was hypoxic and quite dyspneic when up to the bathroom.  Repeat chest x-ray is abnormal with increased density in the left lower base area most likely infiltrate.  Bilateral effusions likely as well.  Flagyl was added to her cefuroxime in case of anaerobic infection associated with possible aspiration for which she is at high risk, though she denies any knowledge of aspiration event.  -Patient also with loose stools C. difficile PCR pending though she says she often gets loose stool with antibiotics so she is not surprised by it.    Subjective/ROS:    -augmented by discussion with facility staff involved in direct care  -limited by: Overwhelming amount of material to review and multiple acute issues requiring attention    I spent 65 minutes face-to-face with patient today with extensive review of her past medical history and how it relates to her current problems as well as acute concerns which have developed in the TCU, as well as anticipatory guidance and prognostication and discussion about future care goals.    Patient says she feels lousy but she is very glad to be out of the hospital.  She says that she has had increasing shortness of breath since she got here though she had shortness of breath prior to leaving the hospital as well.  She says today it might be a little bit better.  She really notes that when she tries to go to the bathroom and get back to bed where she will have \"chest heaving\" for 20 minutes after returning to bed.  She feels extremely weak and washed out.  She has been edematous throughout her hospital stay but says that that is worse especially in the right arm which is now weeping.  She notes that she has extensive bruising in the left arm " "required in her recent hospital stay.  She notes that her lower legs are edematous but she says they have been worse and have not been weeping.  She has poor appetite and has chronic nausea with lots of reflux burping acid brash none of which is new.  She has not been vomiting.  She has not had melena or bright red blood per rectum.  Her stools are loose but not watery.  She is not sure if she is depressed or not but knows that she is worn out and is considering whether it is worth going on with acute medical cares at this point.  She says her children are \"trying to force me into assisted living, but I will not go \"patient drinks 1 to 2 glasses of wine.  Patient admits to multiple falls with worsening weakness over period of weeks to months.  She simply says she never feels well.  She has significant peripheral neuropathy especially in the feet and lower legs.  Her swelling is always worse in the right arm than the left arm she assumes because of her  lymph node dissection.  She denies cough fever sweats or chills PND.  Positive orthopnea, she likes the head of the bed elevated because she feels it is easier to breathe.  Denies dysuria or new skin rash or new adenopathy.  Left ear deafness remainder negative    Past Medical History:   Diagnosis Date     Acute inferolateral myocardial infarction (H) 4/20/2018     LYUBOV (acute kidney injury) (H)      Anemia      Arthritis      Aspiration pneumonia (H)      Breast cancer (H) 2007    right lumpectomy hx of breast cancer     Cough, persistent      Deaf, left      Esophagus perforation 01/22/2016     Fibromyalgia      GERD (gastroesophageal reflux disease)      Hepatic steatosis      Hiatal hernia      History of transfusion      HTN (hypertension) 5/2/2015     Hx antineoplastic chemotherapy 2007    hx of right lumpectomy     Hx of radiation therapy 2007    right breast lumpectomy     Hyperlipidemia      Hypocalcemia      Hypokalemia      Hypomagnesemia      " Hypophosphatemia      IBS (irritable bowel syndrome)      Insomnia      Low back pain radiating to left leg     with tingling     Meniere disease      Multiple sclerosis (H)      Neuropathy      Optic neuritis, right     related to MS     Paroxysmal atrial fibrillation (H)      Sinus tachycardia      Past Surgical History:   Procedure Laterality Date     APPENDECTOMY       BACK SURGERY       BREAST LUMPECTOMY Right 2007    hx of right lumpectomy     CATARACT EXTRACTION Right      CV CORONARY ANGIOGRAM N/A 4/20/2018    Procedure: Coronary Angiogram;  Surgeon: Hudson Atkinson MD;  Location: [facility] Cath Lab;  Service:      CV LEFT HEART CATHETERIZATION WITH LEFT VENTRICULOGRAM N/A 4/20/2018    Procedure: Left Heart Catheterization with Left Ventriculogram;  Surgeon: Hudson Atkinson MD;  Location: [facility] Cath Lab;  Service:      ESOPHAGECTOMY  01/09/2017    distal, with spit fistula creation     ESOPHAGOGASTRODUODENOSCOPY N/A 5/3/2015    Procedure: ESOPHAGOGASTRODUODENOSCOPY;  Surgeon: Rocky Kendall MD;  Location: [facility] GI;  Service:      EYE SURGERY       FRACTURE SURGERY       HERNIA REPAIR       PARTIAL GASTRECTOMY  01/09/2017    Proximal     partial resection distal clavicle, debridement, decompression      right shoulder     PELVIC LAPAROSCOPY      for endometriosis     ND LAP,ESOPHAGOGAST FUNDOPLASTY N/A 1/7/2016    Procedure: LAPAROSCOPIC HIATAL HERNIA REPAIR-TOUPE PROCEDURE.;  Surgeon: Shon Carroll MD;  Location: [facility] OR;  Service: General     SPINE SURGERY       THORACOTOMY Right     for empyema     WRIST SURGERY Left           Family History   Problem Relation Age of Onset     Alzheimer's disease Mother      Other Father         cerebral hemorrhage      Ovarian cancer Sister 69     Breast cancer Daughter 50     No Medical Problems Brother      No Medical Problems Brother      No Medical Problems Daughter      BRCA 1/2 Neg Hx      Cancer Neg Hx      Colon cancer Neg Hx       Endometrial cancer Neg Hx    :       Social History     Socioeconomic History     Marital status:      Spouse name: Not on file     Number of children: Not on file     Years of education: Not on file     Highest education level: Not on file   Occupational History     Occupation:      Comment: Retired   Social Needs     Financial resource strain: Not on file     Food insecurity     Worry: Not on file     Inability: Not on file     Transportation needs     Medical: Not on file     Non-medical: Not on file   Tobacco Use     Smoking status: Former Smoker     Packs/day: 1.00     Years: 15.00     Pack years: 15.00     Types: Cigarettes     Last attempt to quit: 1998     Years since quittin.7     Smokeless tobacco: Never Used   Substance and Sexual Activity     Alcohol use: No     Drug use: No     Sexual activity: Not on file   Lifestyle     Physical activity     Days per week: Not on file     Minutes per session: Not on file     Stress: Not on file   Relationships     Social connections     Talks on phone: Not on file     Gets together: Not on file     Attends Latter day service: Not on file     Active member of club or organization: Not on file     Attends meetings of clubs or organizations: Not on file     Relationship status: Not on file     Intimate partner violence     Fear of current or ex partner: Not on file     Emotionally abused: Not on file     Physically abused: Not on file     Forced sexual activity: Not on file   Other Topics Concern     Not on file   Social History Narrative     (Richard)  2 children  Single level home  2017   :    Has been living independently over the past few months though she had been in [facility] and it sounds like maybe an group home for some time before that.    Current Outpatient Medications on File Prior to Visit   Medication Sig Dispense Refill     acetaminophen (TYLENOL) 325 MG tablet Take 650 mg by mouth 2 (two) times a day as needed for pain (at  least 4 hours apart).       albuterol (PROAIR HFA;PROVENTIL HFA;VENTOLIN HFA) 90 mcg/actuation inhaler Inhale 2 puffs every 4 (four) hours as needed for shortness of breath.       gabapentin (NEURONTIN) 100 MG capsule Take 100 mg by mouth at bedtime.       Lactobacillus acidophilus 100 mg (1 billion cell) cap Take 1 capsule by mouth 2 (two) times a day.       melatonin 3 mg Tab tablet Take 3 mg by mouth at bedtime as needed.       metoprolol succinate (TOPROL-XL) 25 MG Take 1 tablet (25 mg total) by mouth daily. (Patient taking differently: Take 12.5 mg by mouth daily. )  0     omeprazole (PRILOSEC) 20 MG capsule Take 20 mg by mouth 3 (three) times a day. May take an additional dose x 1 daily for reflux symptoms       polyvinyl alcohol (LIQUIFILM TEARS) 1.4 % ophthalmic solution Administer 2 drops to both eyes every 4 (four) hours as needed for dry eyes.       potassium chloride (KLOR-CON) 20 mEq packet Take 20 mEq by mouth 2 (two) times a day.       potassium phos-sodium phos (K-PHOS NEUTRAL) 250 mg Tab tablet Take 1 tablet by mouth daily.       thiamine 100 MG tablet Take 100 mg by mouth daily.       tiZANidine (ZANAFLEX) 4 MG tablet Take 4 mg by mouth at bedtime.       traZODone (DESYREL) 100 MG tablet Take 100 mg by mouth at bedtime.              venlafaxine (EFFEXOR-XR) 37.5 MG 24 hr capsule Take 37.5 mg by mouth daily.       No current facility-administered medications on file prior to visit.    :      ALLERGIES:  Fentanyl; Amoxicillin; Ativan [lorazepam]; Morphine; and Other environmental allergy    Vitals:    Current Vitals   BP: 116/85 mmHg  9/18/2020 20:07  Temp:98.6  F  9/18/2020 20:07  Pulse: 84 bpm  9/18/2020 20:07  Weight: 101.4 Lbs  9/15/2020 13:59  Resp: 16 Breaths/min  9/18/2020 20:07  BS:  O2: 94 %  9/18/2020 20:07  Pain: 1  9/18/2020 20:0  Physical exam:    Patient is alert she is oriented x3 and she is in no distress in bed with her oxygen on.  She is pale and appears frail.  She has difficulty  moving around the bed under her own power for examination.  She has very little if any subcutaneous fat, looks cachectic  Normocephalic/atraumatic gaze is conjugate sclera clear oropharynx is moist demonstrates good range of motion of her neck with rotation of her head she can raise her arms above her head.  Her lung exam is abnormal with decreased/absent breath sounds in the base about a fourth of the way up on the right there is also lesser absent breath sounds in the left base.  Above these areas of absent breath sounds she has rales which are minimal but persistent.  There is no wheezing accessory muscle use tachypnea or increased work of breathing.  Heart is regular S1-S2, soft systolic murmur.  She has extensive bruising in the region of the left elbow.  She has anasarca with 4+ edema in the right arm 3+ in the left arm 3+ in her legs, she pits throughout the legs including the thigh region she has a small amount of presacral edema as well.  She has extensive surgical scars including fistula tract in the right mid chest, about 12 small scars in the belly lumbar sacral scars however skin is intact in visualized areas.  Lower extremities show evidence of venous stasis dermatitis in the pretibial areas with dry flaky skin and some patchy hyperpigmentation.  Patient is symmetrically weak but has antigravity strength throughout.  She has difficulty sitting forward on her own.    Due to the 2020 Covid 19 pandemic, except as noted above, the patient was visually observed at a 6 foot plus distance.  Full PPE was worn  Labs:  Lab Results   Component Value Date    WBC 3.4 (L) 09/17/2020    HGB 9.2 (L) 09/17/2020    HCT 28.1 (L) 09/17/2020     (H) 09/17/2020     09/17/2020     Results for orders placed or performed during the hospital encounter of 04/20/18   Basic metabolic panel   Result Value Ref Range    Sodium 138 136 - 145 mmol/L    Potassium 4.5 3.5 - 5.0 mmol/L    Chloride 110 (H) 98 - 107 mmol/L     CO2 19 (L) 22 - 31 mmol/L    Anion Gap, Calculation 9 5 - 18 mmol/L    Glucose 83 70 - 125 mg/dL    Calcium 7.9 (L) 8.5 - 10.5 mg/dL    BUN 7 (L) 8 - 28 mg/dL    Creatinine 0.52 (L) 0.60 - 1.10 mg/dL    GFR MDRD Af Amer >60 >60 mL/min/1.73m2    GFR MDRD Non Af Amer >60 >60 mL/min/1.73m2         Lab Results   Component Value Date    TSH 1.40 09/20/2019     Lab Results   Component Value Date    HGBA1C 4.5 06/06/2019     [unfilled]  Lab Results   Component Value Date    [id number] 326 09/20/2019     Lab Results   Component Value Date    BNP 1,192 (H) 06/07/2019     [unfilled]  Most Recent EKG     Units 06/05/19  1150   VENTRATE BPM 77   ATRIALRATE BPM 77   QRSDURATION ms 72   QTINTERVAL ms 410   QTCCALC ms 463   P Paxton degrees 77   RAXIS degrees 32   TAXIS degrees 36   MUSEDX  Normal sinus rhythm  Low voltage QRS  Nonspecific T wave abnormality  Abnormal ECG  When compared with ECG of 21-APR-2018 06:56,  Nonspecific T wave abnormality has replaced inverted T waves in Inferior leads  Nonspecific T wave abnormality has replaced inverted T waves in Anterior leads  Confirmed by BENNY LOVELACE, LES LOC:JN ([id number]) on 6/5/2019 4:52:03 PM           Assessment/Plan:      ICD-10-CM    1. Anasarca  R60.1        Acute hypoxic respiratory failure  Abnormal chest x-ray  Ongoing aspiration pneumonia treatment with cefuroxime  High risk for aspiration   I see this is patient's primary acute problem today though there are many issues we discussed.  She was treated for aspiration pneumonia during her recent hospitalization and remains on cefuroxime.  She did require some oxygen at the time of her diagnosis in the hospital but had been off of it for days until it was restarted here due to hypoxia.  She reports great increase in her dyspnea on exertion over the last 3 days, perhaps somewhat better today.  CNP started Flagyl yesterday in case she has further aspiration to cover anaerobes along with the cefuroxime.  She does have an  ampicillin allergy though it sounds like it is GI distress and she might be able to tolerate Augmentin?  She is not been febrile coughing.    I discussed the differential with her which includes acute PE (although patient has had elevated INR and her recent hospital stay), worsening pneumonia/new aspiration pneumonia, worsening pleural effusions especially with significant abnormality of the right base lung exam/CHF.    I recommend hospital evaluation now for diagnostic purposes.  I explained that we cannot know how to treat her appropriately here without knowing the diagnosis for her respiratory failure..  She politely and repeatedly declines to consider hospitalization.  I explained the serious nature of this problem especially if she is not getting appropriate treatment such as if PE is present.  She states an understanding of the risk to her life and declines hospitalization.  She says she will continue to contemplate it and let us know if she changes her mind.      Thus we also discussed hospice comfort care approach.  She says she has been giving this thought herself but feels that her children would not support that decision so she has not talked about it much with them.  She has been so sick for so long with so many days in the hospital, worsening rather than improving despite those many hospital days that she has correctly surmised that her prognosis is quite guarded, especially as she refuses to consider enteral feedings.    - She will allow Doppler studies here in the hospital which I will do of both legs and the right arm.  If negative we could at least start lymphedema wraps and treatment.  However negative studies do not help eliminate the possibility of pulmonary embolism of course.  She states an understanding of that.  - We will restart Lasix despite her recent electrolyte issues at 20 mg daily hold for systolic blood pressure less than 100, though again I am not certain that her acute respiratory  failure is due to CHF/increasing pleural effusion  -Continue current antibiotic therapy, though I explained that if this is due to pneumonia she is worsening despite being on cefuroxime which is a great concern to me and again I would recommend hospital evaluation, CT scanning of the chest etc.  -I encouraged her to discuss my recommendations with her family to see if they could lend some support but she reminds me she is her own decision maker and does not wish to involve them  -Assuming patient somehow stabilizes and does not require additional imaging, follow-up chest x-ray in 4 to 6 weeks recommended to assure resolution of all infiltrates  -COVID-19 has already been sent and is pending    Anasarca  Severe malnutrition  Severe hypoalbuminemia  Hypo-glycemia  History of enteral feeds/PEG tube for 3 years    very difficult situation to treat her anasarca.  Her albumin is 1.6.  She is not eating and has poor appetite.  She is chronically malnourished.  She has had severe electrolyte abnormalities with Lasix which was discontinued as a result.  It is unclear whether she has cirrhosis though it is a concern.  - We will restart Lasix with hold parameters  - Recheck extensive blood work on Monday, and less she has agreed to go to the hospital by that time  - Venous Dopplers of both lower extremities and the right arm and if negative for clot lymphedema therapist can begin lymphedema wraps with which she is well familiar with months of treatment in the past  -Dietitian consult/supplements    Elevated LFTs  Coagulopathy with elevated INR and abnormal fibrinogen  Hepatic steatosis  ?  Cirrhosis?   Patient needs GI consultation for this and other reasons.  This is not immediately available outside of hospital setting of course.  - Recheck LFTs on Monday, INR has well.    Swallowing dysfunction  Multiple esophageal surgeries  Partial gastrectomy/partial esophagectomy  Severe reflux   Patient is currently on high-dose PPI 4  times daily which I did not change but should have GI oversight and management.  Elevate head of bed consistently.  SLP consultation.  Patient at high risk for aspiration    Chronic low back pain   Patient has been on a variety of muscle relaxers is back on tizanidine which she had taken for a long time though she was briefly on Robaxin after 1 of her hospital stays.  I did not make changes today she does have Tylenol as well.  With worsening liver function we would have to moderate Tylenol dosing further.    Electrolyte disturbances   Recently corrected in the hospital but at high risk especially with resumption of Lasix.  Recheck electrolytes on Monday    Depression   Patient has been on doxepin more consistently than other medications.  However during her second [facility] hospitalization noted above it was discontinued in favor of mirtazapine.  However somewhere between then and now she is back on doxepin for reasons that she cannot explain nor can I find in the documentation.  DH, CNP discontinue the doxepin yesterday because of sedation and shows low-dose Effexor.  I would generally favor mirtazapine due to her nutritional status, but it sounds like she recently tried it.  She is now longer on it which makes me wonder if there might be some sort of problem with it.  She cannot recall.  I cannot find it in the extensive documentation.  I am concerned about psychoactive medication choices with her severe comorbidities and recommended psychiatric consultation in which she is not currently interested or willing to do.    Elevated CPK   Noted in the hospital for reasons that are unclear.  Question medication related?  Follow-up CPK on Monday    Coagulopathy   Suspected liver disease along with severe malnutrition.  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  Neurology did see her and recommends EMG in a few weeks if not steadily improving with rehabilitation.  She has severe generalized weakness with multiple falls and is likely to need months of rehabilitation and strengthening to regain adequate functioning for independent living which is her goal.  She does have a follow-up appointment with neurology in 2 weeks.  She does not state her refusal for that so I will leave it intact as ordered.    Alcohol use   There is some debate as to how much this is contributing to her overall medical condition.  It is clear she should be a nondrinker which she does not dispute.  She has declined chemical dependency evaluation.  Recommend full abstinence.    Care goals   Patient wishes she could return to independent living.  She says that she never wants to go back to congregate living situation despite what her children want.  She also realizes that she has continued deteriorate for the last couple of months despite intensive inpatient medical attention, multiple specialty consultations, etc.  Thus we discussed comfort care/hospice type approach but this would not alleviate the housing concern/independent hope that she has.  No final decisions are made today.  I encouraged her to discuss this with her family    Elevated B12   Hospital physician was worried this might be associated with malignancy however it was normal just a few weeks earlier at prior hospital stay.  May need to recheck it in a few weeks    Loose stool   C. difficile PCR is pending      Case discussed with:  Primary CNP   Facility staff       Time: 65 minutes face-to-face, plus another 15 minutes discussion with facility staff and CMP with > 50% in counseling and coordination of care as noted above.  Extensive record review of 60+ minutes so far and ongoing         Phil Hopper MD

## 2021-06-11 NOTE — TELEPHONE ENCOUNTER
[facility] Nurse Triage Telephone Note      Provider: SHANNON Dhaliwal  Facility: [facility]    Facility Type: TCU    Caller: Sharri  Call Back Number:  [phone]    Allergies: Amoxicillin; Ativan [lorazepam]; Morphine; and Other environmental allergy    Reason for call: Nurse reporting that patient over the night was c/o shortness of breath.  O2 sats were in the mid 80's on RA.  O2 was applied @ 2L/min and sats went up to 90-92%.  This morning, the nurse went in and O2 was off.  She was c/o shortness of breath.  O2 sats were ranging from 85-89% on RA.  O2 was applied and sats went up to 93% on 2L/min.  Nurse auscultated crackles in the right mid lung.  No cough noted.  Notable meds:  Toprol XL 12.5mg daily, Ceftin 500mg two times a day x 7 days(for possible aspiration pneumonia---ends 9/21), potassium 20meq two times a day, K-phos 250mg daily.  Patient is requesting that her potassium supplement be changed to the packet for due to difficulty swallowing.  Also requesting an eye drop for chronic dry eye.       Verbal Order/Direction given by Provider: Check Heme 1, CMP, INR, and Pre-Albumin on 9/17/20.  O2 at 1-4L/min to keep sats >90%.  Change potassium to packets, 20meq two times a day.  Artificial tears 2 drops to both eyes Q 4 hours PRN.  Albuterol inhaler 2 puffs Q 4 hours PRN for shortness of breath.      Provider giving order: SHANNON Dhaliwal    Verbal order given to: Sharri Wilkinson RN

## 2021-06-13 NOTE — PROGRESS NOTES
[facility]  New patient consultation        CC-  Chief Complaint   Patient presents with     Consult     arm pain, abdominal pain       Minerva Blanco 72 y.o. is here today, sent to me by  to discuss the patients pain.  Associated symptoms with pain include right shoulder with cortisone injections that helps and left arm with bicep tendon ripped and bone spurs into her rotator suff surgery will be held off for 2 weeks due to her anemia, Patient is also doing tube feedings only currently due to the the recent repaired esophagus. Patient does have diarrhea due to tube feedings. Patient also has a history of MS and fibromyalgia, she     Alleviating factors: Include- medications, nothing, tylenol   Aggravating factors: activity including bending, standing, laying down or lifting  The patient denies using any assistive devices to help with mobilization.  Pain level today: On a scale of 1-10, the patient rates their pain at a 6  Function Rating:F8      HPI  Past Medical History:   Diagnosis Date     Anemia      Arthritis      Breast cancer 2007    right lumpectomy hx of breast cancer     Cough, persistent      Deaf, left      Esophagus perforation 01/22/2016     Fibromyalgia      GERD (gastroesophageal reflux disease)      Hiatal hernia      History of transfusion      HTN (hypertension) 5/2/2015     Hx antineoplastic chemotherapy 2007    hx of right lumpectomy     Hx of radiation therapy 2007    right breast lumpectomy     Hyperlipidemia      IBS (irritable bowel syndrome)      Insomnia      Low back pain radiating to left leg     with tingling     Meniere disease      Multiple sclerosis      Neuropathy      Optic neuritis, right     related to MS     Paroxysmal atrial fibrillation      Sinus tachycardia      Past Surgical History:   Procedure Laterality Date     APPENDECTOMY       BACK SURGERY       BREAST LUMPECTOMY Right 2007    hx of right lumpectomy     CATARACT EXTRACTION Right       ESOPHAGECTOMY  01/09/2017    distal, with spit fistula creation     ESOPHAGOGASTRODUODENOSCOPY N/A 5/3/2015    Procedure: ESOPHAGOGASTRODUODENOSCOPY;  Surgeon: Rocky Kendall MD;  Location: [facility];  Service:      EYE SURGERY       FRACTURE SURGERY       HERNIA REPAIR       PARTIAL GASTRECTOMY  01/09/2017    Proximal     partial resection distal clavicle, debridement, decompression      right shoulder     PELVIC LAPAROSCOPY      for endometriosis     OH LAP,ESOPHAGOGAST FUNDOPLASTY N/A 1/7/2016    Procedure: LAPAROSCOPIC HIATAL HERNIA REPAIR-TOUPE PROCEDURE.;  Surgeon: Shon Carroll MD;  Location: [facility] OR;  Service: General     SPINE SURGERY       THORACOTOMY Right     for empyema     WRIST SURGERY Left      Family History   Problem Relation Age of Onset     Alzheimer's disease Mother      Other Father      cerebral hemorrhage      Ovarian cancer Sister 69     Breast cancer Daughter 50     No Medical Problems Brother      No Medical Problems Brother      No Medical Problems Daughter      BRCA 1/2 Neg Hx      Cancer Neg Hx      Colon cancer Neg Hx      Endometrial cancer Neg Hx      History   Smoking Status     Former Smoker     Packs/day: 1.00     Years: 15.00     Types: Cigarettes     Quit date: 1/1/1998   Smokeless Tobacco     Never Used     History   Alcohol Use No     History   Drug Use No     History   Sexual Activity     Sexual activity: none       Chemical Dependency History: Patient Denies tobacco use, alcohol use, illicit substance use including THC.  Denies any chemical dependency evaluation or treatment in the past.  Denies any legal issues related to substance use.    Psychiatric History:  Patient reports no current psychiatrist or psychologist.  Denies any suicidal ideation.  Denies any hospitalizations for mental illness.    Pertinent Pain Medications:    She currently takes oxycodone and has tried tramadol, fentanyl and opium, gabapentin, tylenol with codeine       Current  Outpatient Prescriptions:      acetaminophen (TYLENOL) 160 mg/5 mL (5 mL) Soln solution, 20.3 mL by G-tube route every 6 (six) hours., Disp: , Rfl:      AMIODARONE HCL (AMIODARONE, BULK, MISC), 20 mL by G-tube route 2 (two) times a day. 20 mg/ml   Give 20 ml bid for 2 weeks then qd  (Dose 400 mg), Disp: , Rfl:      docusate sodium (COLACE) 50 mg/5 mL oral liquid, 100 mg by G-tube route 2 (two) times a day., Disp: , Rfl:      gabapentin (NEURONTIN) 250 mg/5 mL solution, 400 mg by G-tube route every 8 (eight) hours., Disp: , Rfl:      guar gum (NUTRISOURCE FIBER) Pack powder packet, 1 packet by G-tube route 2 (two) times a day., Disp: , Rfl:      Lactobacillus rhamnosus GG (CULTURELLE) 10-15 Billion cell capsule, Take 1 capsule by mouth bedtime. 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, Disp: , Rfl:      loperamide (IMODIUM) 1 mg/5 mL solution, Take 4 mg by mouth 4 (four) times a day as needed for diarrhea., Disp: , Rfl:      melatonin 1 mg Tab tablet, 3 mg by G-tube route bedtime as needed., Disp: , Rfl:      METOPROLOL SUCCINATE ORAL, 50 mg by G-tube route 2 (two) times a day. 10 mg/ml solution, Disp: , Rfl:      multivit-mins-ferrous gluconat (CERTAVITE-ANTIOXID, IRON GLUC,) 9 mg iron/ 15 mL (15 mL) Liqd, Take 10 mL by mouth daily., Disp: , Rfl:      oxyCODONE (ROXICODONE) 20 mg/mL concentrated solution, Take 30 mg by mouth every 4 (four) hours., Disp: , Rfl:      oxyCODONE (ROXICODONE) 5 mg/5 mL solution, Take 5 mg by mouth every 4 (four) hours as needed for pain. For breakthrough pain  Take 5 mg for pain 1-5  OR take 10 mg for pain 6-10  [id number] prn, Disp: , Rfl:      polyethylene glycol (MIRALAX) 17 gram packet, 17 g by G-tube route daily., Disp: , Rfl:      simethicone (MYLICON) 40 mg/0.6 mL drops, 40 mg by G-tube route every 6 (six) hours as needed (cramping)., Disp: , Rfl:      traZODone (DESYREL) 50 MG tablet, 100 mg by G-tube route bedtime. , Disp: , Rfl:      zinc sulfate, bulk, Powd, 220 mg by G-tube route daily.  88mg/ml  (2.5 ml  Dose), Disp: , Rfl:      hydrOXYzine (VISTARIL) 25 MG capsule, Take 1 capsule (25 mg total) by mouth 3 (three) times a day as needed for itching., Disp: 20 capsule, Rfl: 0     TiZANidine (ZANAFLEX) 4 MG capsule, Take 1 capsule (4 mg total) by mouth 3 (three) times a day., Disp: 20 capsule, Rfl: 0    Therapeutic interventions previously tried-   Patient has not tied the hot tub or heated pool due to her open chest wound, she has completed PT.     Review of Systems:  12 point systems were reviewed with pt as documented on pt health form of 10/4/2017. ROS was positive for PMH of breast cancer and treatment,  the rest of the systems were pertinent negative.    Exam  Vitals:    10/04/17 0955   BP: 101/58   Patient Site: Right Arm   Patient Position: Sitting   Cuff Size: Adult Regular   Resp: 16   Weight: 103 lb (46.7 kg)   Height: 5' (1.524 m)     Constitutional-General:  Pleasant, straightforward, healthy appearing individual.   Psych-Mental Status: A & O in no acute distress. Speech is fluent.  Recent and remote memory are intact.  Attention span and concentration are normal. Displays appropriate mood and affect.   H,E,N,T- Symmetrical, Eyes- Perrla, Nares- patents bilaterally, throat- trachea is midline, airway is patent, unlabored respiratory effort.  Lymph-cervical lymph system (anterior and posterior) without palpable nodules or tenderness throughout. Supraclavicular chain without palpable nodules or tenderness throughout.   Cardiovascular- Regular S1, S2 rhythm without murmurs, gallops, clicks or rubs. legs and feet are warm.  Pulses are palpable and grade 2 at the posterior tibial arteries bilaterally, no edema noted.  Pulmonary- lungs are clear to auscultation throughout   Musckuloskeletal  Gait:  Gait is normal.   Gross Motor: Toe walking, heel walking, and Romberg tests are normal.   Strength:  Bilateral upper and lower extremity strength is normal and symmetric 5/5. No atrophy or tone  abnormalities noted.   Sensation:  No loss of sensation noted to light touch in both upper and lower extremities. No dermatomal abnormal distributions noted.  Spine ROM:  Normal range of motion with no pain reproduction in the cervical, thoracic and lumbar spine.   Provocative Maneuvers:  Upper extremity, Hip, sacroiliac, and knee provocative maneuvers are negative,Tinel's test negative bilaterally, Facet loading test negative bilaterally. Impingement tests of rotator cuff pathology, negative bilaterally. Knee exam: Ligaments test was negative, John test was negative. SLR test was negative bilaterally.  Palpation:  Inspection and palpatory examination of the spine and upper/lower extremities is unremarkable. Tenderness is noted in the open chest wound (packing noted in wound bed and bilateral shoulders, left > right    Neuro:  Bilateral upper and lower extremity coordination and muscle stretch reflexes are physiologic and symmetric 2/4.  Babinski responses are downgoing.  No clonus bilaterally. Negative hoffmans sign bilaterally.   Integumentary: no rashes or breaks in the skin, no open wounds.     Lab:  Last UDS on 10/4/2017 was taken today and is pending.      Imaging:  No new imaging available to review    :  Dated 10/4/2017 was reviewed with the patient      Assessment -  Todays diagnosis includes   1. Chronic pain syndrome    2. Chronic intractable pain      This 72 y.o female presents to the to the office today to establish cares for her pain conditions. Patient has a significant medical history including MS, open chest wound and esophageal repairs-fistula, breast cancer and treatment, radiation and chemotherapy. Patient states that she wants to get off of her narcotics so she can drive again and be independent. Patient is currently taking oxycodone 5 mg x6 per day and has been weaned down significantly and quickly. I recommend continuing the weaning process but providing withdrawal medications as  needed for support. Patient already gets lomotil due to her chronic diarrhea due to the G tube feedings. Patient is a good candidate for medical cannabis, this was discussed today in the office and the patient is willing to go forward with the process. I will take a UDT today as baseline, this is standard as well as have her follow up in the office in 2 weeks for re-evaluation. Patient states that she also has a hard time healing. I recommend that she have a consult to a dietician as well as lab draws to monitor her albumin and protein for her delayed healing and malnourished status, they may need to work with her tube feeding formula to accommodate this. The patient wants to discuss with her PCP and have him work this up further.     Also patient has situational depression with this chronic illness that she has with an unknown end date. She would benefit from behavioral thearpy, we discussed this but the patient wants to wait at this time.     We did discuss the medical cannabis program today and I answered her questions in regards to known costs and different formularies known in [place]. Brochures were provided to the patient today.     Takes tube feedings  So medication needs to be buccal or available to put into her tube.     After reviewing the patients chart and physical findings I agree that the patient would benefit from what the pain center has to offer. I have discussed with the  patient today the diagnosis and treatment recommendations.  We reviewed the natural progression of this problem at great length.  Some possible benefits and detriments of physical therapy, chiropractic care, medication management, behavorial therapy, anti-inflammatory diet and other alternative treatments were discussed.  We also discussed future possible treatment options in a stepwise fashion, including interventional pain procedures and injections and possible surgical referral if needed.      The patient understands that  pain medication is not prescribed during the initial patient consultation at the pain center. If the patient is on pain medications for chronic pain, the PCP or prescribing provider may choose  to prescribe until the patient presents for follow up at the pain center. Medication prescriptions provided by the pain center, will depend on the UA results, safe prescribing practices established by the CDC and patient response to their current treatment regimen at the follow up visit.      Patient set goals today in the office to achieve with the pain centers help. They are:  1. Pain control while weaning off of the narcotics.     Plan Interventions recommended today:  Assessment tool- KEISHA Score: 54  NDI Score: 54      Follow up in 2-3 weeks, we will discuss a pain treatment plan at that time, which may include narcotics and alternative therapies.     Sign a REYES at the  so we can obtain your records for our next visit- [facility]     I recommend medical cannabis for this patient she is a candidate, will evaluate once UDT comes back     Educational brochures provided to the patient today in regards to medical cannabis     Continue your current regimen of alleviating factors    Please see your current provider for any continued prescription, they may choose to provide you prescriptions of your narcotics until we have your urine screen back. They will continue to manage your general health and have requested you see the pain center for pain management. Please discuss any health concerns with your PCP     [place] Precautions are taken with every patient which includes a  report and drug screen. A UA will be taken today for baseline screening.     Behavioral Therapy- not at this time     Physical and Occupational Therapy- just finished, awaiting her shoulder pain issues to be dealt with     Please make an appointment with Dr. Nino and discuss malnourished status, he may need to draw labs to check your protein  levels, this could be part of the problem with your delayed healing with your esophagus, I would recommend a dietitian and pharmacist to be involved in your care for the titration of the tube feedings to address any malnourish issues. Patient also has back pain will defer to Dr. Nino to order any MRIs of her lumbar, cervical and thoracic spine if he deems necessary.     Education was provided to the patient today in regards to their specific diagnosis.     Marijuana is legal in the state [facility] for chronic, intractable pain.Current diagnosis include certain cancer diagonosis, Glaucoma,HIV/AIDS, Tourette Syndrome, Amyotrophic Lateral Sclerosis (ALS). Seizures, including those characteristic of Epilepsy, Multiple Sclerosis, Inflammatory bowel disease, including Crohn s disease. Terminal illness, intractable pain, Post-Traumatic Stress Disorder    The patient Minerva Blanco qualify. The registration date is for 1 year after the registration is completed from the state. Until that time, our Pain Center has a no tolerance policy for THC use. Patient is interested in discussing medical marijuana.  The interest in medical marijuana stems from their current diagnosis of   1. Chronic pain syndrome    2. Chronic intractable pain     and ongoing pain. Patient was educated about the difference between medical and recreational marijuana.     Patient was educated that currently marijuana remains a Schedule I drug according to the Drug Enforcement Agency ([place]) a branch of the [place] Department of Justice. The [place] license that allows the prescribing of opioid medication does not allow for prescribing of a Schedule I drug.  Schedule I drugs, substances, or chemicals are defined as drugs with no currently accepted medical use and a high potential for abuse. Schedule I drugs are the most dangerous drugs of all the drug schedules with potentially severe psychological or physical dependence. Some examples of Schedule I drugs are: heroin,  lysergic acid diethylamide (LSD), marijuana (cannabis), 3,4-methylenedioxymethamphetamine (ecstasy), methamphetamine.  If any Schedule I drugs are found in a random Urine Drug Screen while using medical marijuana this will be reported to the dispensary and re-certification will be denied.    Patient was educated this treatment will not be covered by insurance. The Pain Center will not advocate for financial coverage of this drug.  This is not a treatment that will be managed through the Pain Center and follow-up with dispensaries would be expected and monitored.  Your pain provider expects to have communication with the dispensary as needed.  If you are certified by another provider, we expect that you notify our Pain Center and bring appropriate documentation.  We expect patients to choose the formulation at the dispensary that is lowest in THC and highest in CBD.      We would expect the dose of the opioids to reduce by at least half if the patient is using medical marijuana.  Pain control, daily function, and reduction in oral medications would be assessed regularly.    Patient Warnings  Important information and warnings about using medical cannabis:  *Use of medical cannabis products is experimental  *Common side effects include dizziness, fatigue, dry mouth, lightheadedness, drowsiness, nausea  *Tell all your health care professionals about the medical cannabis you are using  *The following groups are at increased risk of harm from use - those under 18, women who are pregnant or breast-feeding, persons with a personal or family history of psychotic disorder  *Risks for use by persons with serious heart or liver disease  *Do not drive, operate machinery, or do work that could harm people under the influence of medical cannabis  *Keep medication secure and in their original containers  *Do not use medical cannabis where it is illegal  *Do not give or sell to others medical cannabis that you purchase  *Risk of  dependence and addiction         Patient reminders:   Diagnostics: UDT/SWAB collected 10/4/2017 and results are pending.  UDT/SWAB:  Patient required a random Urine Drug Testing, due to the need to comply with [place] Model Policy Guidelines and CDC Guideline for the use of any controlled substances. This is to ensure that patient is compliant with treatment, and monitor for risks such as diversion, abuse, or any other aberrant behaviors. Patient is either being considered for or taking a controlled substance. Unexpected findings will be discussed and treatment decision may be adjusted. Testing is being implemented across the board randomly w/o bias related to age, race, gender, socioeconomic status or Tenriism affiliation.     SAFETY REMINDERS  No alcohol while taking controlled substances. Alcohol is not an illegal substance, it is unsafe to use in combination. It is a build up of substances in the body that can be extremely hazardous and may cause respirations to slow to a dangerous rate resulting in hospitalization, brain damage, or death.    Opioid medications have been associated with sharp rise in unintentional overdose and death.  Overdose is a condition characterized by the consumption in excess of a particular drug causing adverse effects. This can happen b/c you are sick, accidentally or intentionally took an extra dose, are on multiple medication that can interact. Someone took your medication and they are not use to the medication.  Symptoms of overdose include:   !breathing slow and shallow, erratic or not at all  !pinpoint pupils, hallucinations  !confusion  !muscle jerks, slack muscles   !extreme sleepiness or loss of alertness   !awake but not able to talk   !face pale or clammy, vomiting, for lighter skinned people, the skin tone turns bluish purple, for darker skinned people, it turns grayish or ashen   If in a situation where overdose is a concern engage the emergency response system (dial  674).    In one study it was noted that 80% of unintentional overdoses occurred in people who were taking a combination of opioids and benzodiazepines.    Do not sell, loan, borrow or share your opioid medication with anyone. Deaths have occurred as a result of this practice. It is illegal and patients are being prosecuted.     Prevent unexpected access/loss of medication: Keep medication locked. Only carry what you need with you.    The patient agrees to the plan and has no further questions, if questions arise the patient knows to call [phone].       Thank you for this consult and opportunity to assist with this patients care.    Millie Weaver, [facility]  [address]  [place] [address]  Ph: [phone]  Fax: [phone]

## 2021-06-14 NOTE — PROGRESS NOTES
PAIN CLINIC FOLLOW UP PROGRESS NOTE    CC:  Chief Complaint   Patient presents with     Arm Pain     Chest Pain       HPI  Minerva Blanco is a 72 y.o. female who presents for evaluation   Chief Complaint   Patient presents with     Arm Pain     Chest Pain    that is causing continued pain. Since the last visit the patient endorses trips to the urgent care or ED specifically for their pain. The patient denies any new medications, diagnoses since the last visit. Specific questions indicated the patient wanted addressed today include: Just her current pain as well as update with her multiple issues with the G-tube that had to be replaced ×2 in her multiple visits to the emergency department room because of the increased pain      Major issues:  1. Chronic pain syndrome        Today the pain is located in their thoracic cavity and is described as throbbing and aching when it is aggravated it lasts for consistent, and is rated at a 6 on a scale of 1-10  Associated symptoms: Denies any loss of bladder control, fevers/chills, unintentional weight loss, weakness, numbness or pain that interferes with sleep.   Aggravating factors include: Applications such as G-tube misplacement, replacing.  Alleviating factors: Indications  Adverse effects of medications: NONE   Functional symptoms:F8  Current subjective treatment efficacy: Poor      Previous Medical History  History   Alcohol Use No     History   Drug Use No     History   Smoking Status     Former Smoker     Packs/day: 1.00     Years: 15.00     Types: Cigarettes     Quit date: 1/1/1998   Smokeless Tobacco     Never Used       Pertinent Pain Medications/interventions:  She currently takes oxycodone 5 mg up to 6 per day, Butrans 10 mics per hour patient recently had a few other prescriptions for Norco and tramadol that were postprocedural given to her by Ulysses Romano    Review of Systems:  12 point systems were reviewed with pt as documented on pt health form and the  patient denies any new diagnosis or changes in 12 point system review since the last visit.     Physical Exam  Vitals:    12/13/17 0827   BP: 100/69   Patient Site: Right Arm   Patient Position: Sitting   Pulse: 83   Resp: 16   Weight: 103 lb (46.7 kg)   Height: 5' (1.524 m)     General- patient is alert and oriented, in NAD, well-groomed, well-nourished  Psych- Judgment and insight normal, AOx4, recent and remote memory normal, mood and affect normal  Eyes- pupils are equal and reactive, conjunctiva is clear bilaterally, no ptosis is noted.   Respiratory- breathing is non-labored  Cardiovascular- extremities warm and well perfused, no peripheral edema or varicosities.  Musculoskeletal- gait is normal, extremities with no joint swelling, erythema, or warmth.  Neuro- normal strength, no gait abnormalities, normal sensation to pain, temperature, light touch.  Integumentary- no rashes, dermatitis or discolorations noted throughout, open wound noted in the right thoracic cavity.     Medications    Current Outpatient Prescriptions:      acetaminophen (TYLENOL) 160 mg/5 mL (5 mL) Soln solution, 20.3 mL by G-tube route every 6 (six) hours., Disp: , Rfl:      AMIODARONE HCL (AMIODARONE, BULK, MISC), 20 mL by G-tube route 2 (two) times a day. 20 mg/ml   Give 20 ml bid for 2 weeks then qd  (Dose 400 mg), Disp: , Rfl:      buprenorphine (BUTRANS) 10 mcg/hour PTWK patch, Place 1 patch on the skin every 7 days., Disp: 4 patch, Rfl: 0     BUTRANS 7.5 mcg/hour PTWK patch, Place 1 patch on the skin every 7 days., Disp: 4 patch, Rfl: 0     docusate sodium (COLACE) 50 mg/5 mL oral liquid, 100 mg by G-tube route 2 (two) times a day., Disp: , Rfl:      gabapentin (NEURONTIN) 250 mg/5 mL solution, 400 mg by G-tube route every 8 (eight) hours., Disp: , Rfl:      guar gum (NUTRISOURCE FIBER) Pack powder packet, 1 packet by G-tube route 2 (two) times a day., Disp: , Rfl:      hydrOXYzine (VISTARIL) 25 MG capsule, Take 1 capsule (25 mg  total) by mouth 3 (three) times a day as needed for itching., Disp: 20 capsule, Rfl: 0     Lactobacillus rhamnosus GG (CULTURELLE) 10-15 Billion cell capsule, Take 1 capsule by mouth bedtime. 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, Disp: , Rfl:      loperamide (IMODIUM) 1 mg/5 mL solution, Take 4 mg by mouth 4 (four) times a day as needed for diarrhea., Disp: , Rfl:      melatonin 1 mg Tab tablet, 3 mg by G-tube route bedtime as needed., Disp: , Rfl:      METOPROLOL SUCCINATE ORAL, 50 mg by G-tube route 2 (two) times a day. 10 mg/ml solution, Disp: , Rfl:      multivit-mins-ferrous gluconat (CERTAVITE-ANTIOXID, IRON GLUC,) 9 mg iron/ 15 mL (15 mL) Liqd, Take 10 mL by mouth daily., Disp: , Rfl:      oxyCODONE (ROXICODONE) 5 MG immediate release tablet, Take 1 tablet (5 mg total) by mouth every 4 (four) hours as needed for pain., Disp: 180 tablet, Rfl: 0     polyethylene glycol (MIRALAX) 17 gram packet, 17 g by G-tube route daily., Disp: , Rfl:      simethicone (MYLICON) 40 mg/0.6 mL drops, 40 mg by G-tube route every 6 (six) hours as needed (cramping)., Disp: , Rfl:      TiZANidine (ZANAFLEX) 4 MG capsule, Take 1 capsule (4 mg total) by mouth 3 (three) times a day., Disp: 20 capsule, Rfl: 0     traZODone (DESYREL) 50 MG tablet, 100 mg by G-tube route bedtime. , Disp: , Rfl:      zinc sulfate, bulk, Powd, 220 mg by G-tube route daily. 88mg/ml  (2.5 ml  Dose), Disp: , Rfl:     Lab:  Last UDS on 10/04/2017 was positive for the currently prescribed narcotics. Please see the scanned document from the outside lab. This was reviewed with the patient.      Imaging:  No new imaging.      Recent   Dated 12/13/2017 was reviewed with the patient today.       Assessment:   Minerva Blanco is a 72 y.o. female seen in clinic today for   Chief Complaint   Patient presents with     Arm Pain     Chest Pain   They are here for follow up and continued medical management of their pain. Today we reviewed the lab work of the UDT test and the  results are expected because of the prescribed narcotics found in the urine drug screen.     Patient follows up in clinic today for her routine pain clinic appointment.  She is here with an update in her overall health she has had complications with her G-tube which has had to be replaced ×2 she is also going into wound closure surgery coming up in January with plastic surgery for the thoracic wound that she still has.  She states that she is having a difficult time managing with the pain with all the episodes that have increased which has taken her back to emergency department multiple times.  The patient does not have a pain contract with me because of her known upcoming surgeries and complications I am covering her baseline chronic pain with the Butrans patch 10 mics per hour that is to be changed weekly as well as oxycodone 5 mg up to 6 tabs per day.  It is expected to have other providers provide her with medication after surgical procedures.  Patient will follow back up with me in approximately 8 weeks for review she is in the process of getting medical cannabis certification completed so that she can obtain medical cannabis for her chronic pain.  This would likely reduce her need for narcotics over time.  Patient's goal is to wean off of narcotics completely after her surgery.     I have also reviewed the information obtained from the last visit encounter after the REYES was signed and have asked any pertintent questions needed from other healthcare providers/family/patient to continue care and formulate a treatment plan.     Patients current MME is 64  Patient to call clinic for next month's prescription 5 days in advance. Based on the patients response to their previous narcotic therapy and quality of life, which includes their participation in ADLs, I recommend that the narcotics therapy continue, however the changes listed below will be incorporated into their plan of care.     Patient set goals to   1.   Pain coverage    Plan:   Interventions-      Follow up in 8 weeks to evaluate the effectiveness of the narcotic and other treatment interventions ordered today.   Will renew Butrans patch to 10 mcg/hr, 4 patches call for refill 3-5 days in advance  Continue your oxycodone- recommend 3-4 per day and max of 6 per day after procedures, more of a sliding scale call for a refill 3-5 days in advance  Will provide a refill of your prescriptions   Good luck with your upcoming surgery to help with the primary wound closure that is for the thoracic wound   No narcotic agreement is signed at the clinic due to the multiple procedures that you have upcoming.   Agree with medical cannabis this would be in addition to your baseline coverage and will likely reduce your need for narcotics.       Orders placed today  Medications that were ordered today   Requested Prescriptions     Signed Prescriptions Disp Refills     oxyCODONE (ROXICODONE) 5 MG immediate release tablet 180 tablet 0     Sig: Take 1 tablet (5 mg total) by mouth every 4 (four) hours as needed for pain.     buprenorphine (BUTRANS) 10 mcg/hour PTWK patch 4 patch 0     Sig: Place 1 patch on the skin every 7 days.     No orders of the defined types were placed in this encounter.      UDT/SWAB:  Patient required a random Urine Drug Testing, due to the need to comply with [employer] Policy Guidelines and CDC Guideline for the use of any controlled substances. This is to ensure that patient is compliant with treatment, and monitor for risks such as diversion, abuse, or any other aberrant behaviors. Patient is either being considered for or taking a controlled substance. Unexpected findings will be discussed and treatment decision may be adjusted. Testing is being implemented across the board randomly w/o bias related to age, race, gender, socioeconomic status or Church affiliation.    The patient understand todays plan and has their questions answered in regards to  expectations and current treatment plan.     SAFETY REMINDERS  No alcohol while taking controlled substances. Alcohol is not an illegal substance, it is unsafe to use in combination. It is a build up of substances in the body that can be extremely hazardous and may cause respirations to slow to a dangerous rate resulting in hospitalization, brain damage, or death.    Opioid medications have been associated with sharp rise in unintentional overdose and death.  Overdose is a condition characterized by the consumption in excess of a particular drug causing adverse effects. This can happen b/c you are sick, accidentally or intentionally took an extra dose, are on multiple medication that can interact. Someone took your medication and they are not use to the medication.  Symptoms of overdose include:   !breathing slow and shallow, erratic or not at all  !pinpoint pupils, hallucinations  !confusion  !muscle jerks, slack muscles   !extreme sleepiness or loss of alertness   !awake but not able to talk   !face pale or clammy, vomiting, for lighter skinned people, the skin tone turns bluish purple, for darker skinned people, it turns grayish or ashen   If in a situation where overdose is a concern engage the emergency response system (dial 911).    In one study it was noted that 80% of unintentional overdoses occurred in people who were taking a combination of opioids and benzodiazepines.    Do not sell, loan, borrow or share your opioid medication with anyone. Deaths have occurred as a result of this practice. It is illegal and patients are being prosecuted.     Prevent unexpected access/loss of medication: Keep medication locked. Only carry what you need with you.    Millie Weaver, [facility]  [address]  [place] [address]  Ph: [phone]  Fax: [phone]

## 2021-06-15 NOTE — PROGRESS NOTES
PAIN CLINIC FOLLOW UP PROGRESS NOTE    CC:  Chief Complaint   Patient presents with     Arm Pain     Chest Pain       HPI  Minerva Blanco is a 72 y.o. female who presents for evaluation   Chief Complaint   Patient presents with     Arm Pain     Chest Pain    that is causing continued pain. Since the last visit the patient denies any trips to the urgent care or ED specifically for their pain. The patient denies any new medications, diagnoses since the last visit. Specific questions indicated the patient wanted addressed today include: She presents with her spouse today to review her chronic pain management needs and reports no relief from the medical cannabis or Butrans patch as well as oxycodone up to 6 tablets per day      Major issues:  1. Chronic pain syndrome    2. Chronic intractable pain        Today the pain is located in their chest cavity and is described as sharp stabbing when it is aggravated it lasts for constantly with sharp intermittent increases and is rated at a 7  on a scale of 1-10  Associated symptoms: Denies any loss of bladder control, fevers/chills, unintentional weight loss, weakness, numbness or pain that interferes with sleep.   Aggravating factors include: Unknown currently  Alleviating factors: Medications  Adverse effects of medications: NONE   Functional symptoms: F8 affects sleep, socialization, ADLs daily  Current subjective treatment efficacy: Poor      Previous Medical History  History   Alcohol Use No     History   Drug Use No     History   Smoking Status     Former Smoker     Packs/day: 1.00     Years: 15.00     Types: Cigarettes     Quit date: 1/1/1998   Smokeless Tobacco     Never Used       Pertinent Pain Medications/interventions:  She currently takes Butrans patch 10 mics per hour, oxycodone 5 mg up to 6 times per day    Review of Systems:  12 point systems were reviewed with pt as documented on pt health form and the patient denies any new diagnosis or changes in 12 point  system review since the last visit.     Physical Exam  Vitals:    01/25/18 1406   BP: 102/62   Patient Site: Right Arm   Patient Position: Sitting   Cuff Size: Adult Regular   Pulse: 79   Weight: 103 lb (46.7 kg)   Height: 5' (1.524 m)     General- patient is alert and oriented, in NAD, well-groomed, well-nourished  Psych- Judgment and insight normal, AOx4, recent and remote memory normal, mood and affect normal  Eyes- pupils are equal and reactive, conjunctiva is clear bilaterally, no ptosis is noted.   Respiratory- breathing is non-labored  Cardiovascular- extremities warm and well perfused, no peripheral edema or varicosities.  Musculoskeletal- gait is normal, extremities with no joint swelling, erythema, or warmth.  Neuro- normal strength, no gait abnormalities, normal sensation to pain, temperature, light touch.  Integumentary- no rashes, dermatitis or discolorations noted throughout, Open chest cavity is still present    Medications    Current Outpatient Prescriptions:      acetaminophen (TYLENOL) 160 mg/5 mL (5 mL) Soln solution, 20.3 mL by G-tube route every 6 (six) hours., Disp: , Rfl:      AMIODARONE HCL (AMIODARONE, BULK, MISC), 20 mL by G-tube route 2 (two) times a day. 20 mg/ml   Give 20 ml bid for 2 weeks then qd  (Dose 400 mg), Disp: , Rfl:      docusate sodium (COLACE) 50 mg/5 mL oral liquid, 100 mg by G-tube route 2 (two) times a day., Disp: , Rfl:      gabapentin (NEURONTIN) 250 mg/5 mL solution, 400 mg by G-tube route every 8 (eight) hours., Disp: , Rfl:      guar gum (NUTRISOURCE FIBER) Pack powder packet, 1 packet by G-tube route 2 (two) times a day., Disp: , Rfl:      hydrOXYzine (VISTARIL) 25 MG capsule, Take 1 capsule (25 mg total) by mouth 3 (three) times a day as needed for itching., Disp: 20 capsule, Rfl: 0     Lactobacillus rhamnosus GG (CULTURELLE) 10-15 Billion cell capsule, Take 1 capsule by mouth bedtime. 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, Disp: , Rfl:      loperamide (IMODIUM) 1 mg/5 mL  solution, Take 4 mg by mouth 4 (four) times a day as needed for diarrhea., Disp: , Rfl:      Medical Cannabis, Use 1 Units As Directed. Take as instructed by the medical cannabis dispensary. The provider who enrolled the patient in the medical cannabis registry and certified this  patient will maintain ownership and liability of all provider reporting and registration requirements., Disp: , Rfl:      melatonin 1 mg Tab tablet, 3 mg by G-tube route bedtime as needed., Disp: , Rfl:      METOPROLOL SUCCINATE ORAL, 50 mg by G-tube route 2 (two) times a day. 10 mg/ml solution, Disp: , Rfl:      multivit-mins-ferrous gluconat (CERTAVITE-ANTIOXID, IRON GLUC,) 9 mg iron/ 15 mL (15 mL) Liqd, Take 10 mL by mouth daily., Disp: , Rfl:      polyethylene glycol (MIRALAX) 17 gram packet, 17 g by G-tube route daily., Disp: , Rfl:      simethicone (MYLICON) 40 mg/0.6 mL drops, 40 mg by G-tube route every 6 (six) hours as needed (cramping)., Disp: , Rfl:      TiZANidine (ZANAFLEX) 4 MG capsule, Take 1 capsule (4 mg total) by mouth 3 (three) times a day., Disp: 20 capsule, Rfl: 0     traZODone (DESYREL) 50 MG tablet, 100 mg by G-tube route bedtime. , Disp: , Rfl:      zinc sulfate, bulk, Powd, 220 mg by G-tube route daily. 88mg/ml  (2.5 ml  Dose), Disp: , Rfl:      gabapentin (NEURONTIN) 400 MG capsule, Take 1 capsule (400 mg total) by mouth 2 (two) times a day., Disp: 60 capsule, Rfl: 0     oxyCODONE (ROXICODONE) 10 mg immediate release tablet, Take 1 tablet (10 mg total) by mouth every 4 (four) hours as needed for pain., Disp: 120 tablet, Rfl: 0    Lab:  Last UDS on 10/4/2017was positive for the currently prescribed narcotics. Please see the scanned document from the outside lab. This was reviewed with the patient.      Imaging:  No new imaging.      Recent   Dated 1/25/2018 was reviewed with the patient today.   Her copy was reviewed    Assessment:   Minerva Blanco is a 72 y.o. female seen in clinic today for   Chief  Complaint   Patient presents with     Arm Pain     Chest Pain     She presents the office today to continue her chronic pain treatment plan.  Patient reports that the Butrans patch has not been helping her and is also too costly at this time.  Patient also reports medical cannabis helps some but she is not quite convinced is helping completely she does complain about repeated sharp pains that come into her thoracic cavity she does have a stent placed there however she has not had any recent workup for this.  Patient is concerned about the placement of the stent itself and perhaps stricture.  I have deferred patient to refer back to her primary care provider for further workup and imaging.  In the event that there is something happening there that needs to be addressed.    At this time I would encourage the patient to continue the medical cannabis as she sees fit unfortunately we are limited to any type of extended release medications due to her G-tube and her inability to take anything by mouth at this time.  I will increase the dose of her oxycodone to 10 mg maximum 4 tablets per day, she can crush this and put this into her G-tube.  Patient would also like to try gabapentin again this is been ordered to take twice a day 400 mg she can also crush this place into her tube for any type of neuropathic pain.  Patient does describe her chest cavity pain as a sharp nerve pain.  Patient did bring her spouse in the office today who is her advocate for pain control.  Education was provided to him in regards to our limitations in regards to the chronic pain management plan they both verbalized understanding and have no further questions today    They are here for follow up and continued medical management of their pain. Today we reviewed the lab work of the UDT test and the results are expected because of the prescribed narcotics found in the urine drug screen.     I have also reviewed the information obtained from the last  visit encounter after the REYES was signed and have asked any pertintent questions needed from other healthcare providers/family/patient to continue care and formulate a treatment plan.     Patients current MME is 60  Patient to call clinic for next month's prescription 5 days in advance. Based on the patients response to their previous narcotic therapy and quality of life, which includes their participation in ADLs, I recommend that the narcotics therapy continue, however the changes listed below will be incorporated into their plan of care.     Patient set goals to   1.  Controlled pain throughout her surgical procedures    Plan:   Interventions-  KEISHA Score: 0  NDI Score: 0     Follow up in 8 weeks to evaluate the effectiveness of the narcotic and other treatment interventions ordered today.     Due to cost of medication the butrans patch will be discontinued    Will defer patient to the PCP for further workup in regards to the patients concerns of chest strictures and stent issues, requesting a MRI and CT scan     Will increase your oxycodone 10 mg tablets up to 4 per day this is for your chronic pain management     Will provide a refill of your prescriptions     Good luck with your upcoming surgery to help with the primary wound closure that is for the thoracic wound     A narcotic agreement was signed today, we will continue to manage your chronic pain with the oxycodone 10 mg and expect the surgeons to provide additional acute post operative pain medication during the global period.     To date you have failed fentanyl patches, butrans patch, morphine. Patient is currently trying medical cannabis to help with the chronic pain as well.      Orders placed today  Medications that were ordered today   Requested Prescriptions     Signed Prescriptions Disp Refills     gabapentin (NEURONTIN) 400 MG capsule 60 capsule 0     Sig: Take 1 capsule (400 mg total) by mouth 2 (two) times a day.     oxyCODONE (ROXICODONE) 10 mg  immediate release tablet 120 tablet 0     Sig: Take 1 tablet (10 mg total) by mouth every 4 (four) hours as needed for pain.     No orders of the defined types were placed in this encounter.      UDT/SWAB:  Patient required a random Urine Drug Testing, due to the need to comply with [facility] Model Policy Guidelines and CDC Guideline for the use of any controlled substances. This is to ensure that patient is compliant with treatment, and monitor for risks such as diversion, abuse, or any other aberrant behaviors. Patient is either being considered for or taking a controlled substance. Unexpected findings will be discussed and treatment decision may be adjusted. Testing is being implemented across the board randomly w/o bias related to age, race, gender, socioeconomic status or [employer]ist affiliation.    The patient understand todays plan and has their questions answered in regards to expectations and current treatment plan.     SAFETY REMINDERS  No alcohol while taking controlled substances. Alcohol is not an illegal substance, it is unsafe to use in combination. It is a build up of substances in the body that can be extremely hazardous and may cause respirations to slow to a dangerous rate resulting in hospitalization, brain damage, or death.    Opioid medications have been associated with sharp rise in unintentional overdose and death.  Overdose is a condition characterized by the consumption in excess of a particular drug causing adverse effects. This can happen b/c you are sick, accidentally or intentionally took an extra dose, are on multiple medication that can interact. Someone took your medication and they are not use to the medication.  Symptoms of overdose include:   !breathing slow and shallow, erratic or not at all  !pinpoint pupils, hallucinations  !confusion  !muscle jerks, slack muscles   !extreme sleepiness or loss of alertness   !awake but not able to talk   !face pale or clammy, vomiting, for  lighter skinned people, the skin tone turns bluish purple, for darker skinned people, it turns grayish or ashen   If in a situation where overdose is a concern engage the emergency response system (dial 911).    In one study it was noted that 80% of unintentional overdoses occurred in people who were taking a combination of opioids and benzodiazepines.    Do not sell, loan, borrow or share your opioid medication with anyone. Deaths have occurred as a result of this practice. It is illegal and patients are being prosecuted.     Prevent unexpected access/loss of medication: Keep medication locked. Only carry what you need with you.    Over 40 minutes was spent with the patient today and her spouse providing education in regards to long acting pain medications for her chronic pain needs and limitations due to the fact the patient cannot take anything orally.    Millie Weaver, [facility]  [address]  [place] [address]  Ph: [phone]  Fax: [phone]

## 2021-06-16 NOTE — PROGRESS NOTES
[facility] pre op facility called to request an increase in the Patients pain medications, currently she is on oxycodone 10 mg 4 times a day, will increase it to 6 tablets per day in conjunction with the medical cannabis. This additional increase was sent to her pharmacy,     Patients surgery is 2/17/2018 at the [facility] for thoracic closure for the ongoing open wound.      VM mail with [facility] with the update    Tried to connect with patient but she was unable to hear, if she calls back please up her. On current plans of increase. Again the pain contract is in place however, it is expected that her Thoracic surgeon provides her with opioids after surgery for her acute on chronic pain needs.     Millie Weaver, [facility]

## 2021-06-16 NOTE — TELEPHONE ENCOUNTER
"Telephone Encounter by Freya Joyner at 2/22/2019  2:45 PM     Author: Freya Joyner Service: -- Author Type: --    Filed: 2/22/2019  2:47 PM Encounter Date: 2/21/2019 Status: Signed    : Freya Joyner       No PA is needed for this medication strength and quantity.  Per call to insurance pharmacy was getting a \"soft\" rejection code 88 which means this can be resolved in the pharmacy.  It was not a clinical rejection.     Pharmacy and patient was called and made aware           "

## 2021-06-16 NOTE — TELEPHONE ENCOUNTER
Telephone Encounter by Sandra Mckeon RN at 3/12/2019  4:08 PM     Author: Sandra Mckeon RN Service: -- Author Type: Registered Nurse    Filed: 3/12/2019  4:26 PM Encounter Date: 3/12/2019 Status: Signed    : Sandra Mckeon RN (Registered Nurse)       Medication being requested: Hydromorphone  Last visit date: 09/28.  Provider: FROILAN  Next visit date: 04/09.  Provider: FROILAN  Expected follow up:   MTM visit (Pain Center) date: N/A  UDT date: 01/25/18  Agreement date: 01/25/18   snipped in:    Red Flags/Comments identified on call: N/A  Pertinent between visit information about requested medication (telephone, mychart, prior authorization): Fall, compression fracture and vertebroplasty since last visit so has had to cancel appts.  Script being sent to provider by nurse- dates and quantity:   Requested Prescriptions     Pending Prescriptions Disp Refills   ? HYDROmorphone (DILAUDID) 2 MG tablet 112 tablet 0     Sig: Take 1 tablet (2 mg total) by mouth every 6 (six) hours as needed for pain (max of 4 per day).   03/22-04/09- bridge until appt    Pharmacy cued: [facility]  Standing orders for withdrawal protocol implemented: N/A

## 2021-06-16 NOTE — TELEPHONE ENCOUNTER
"Telephone Encounter by Gabriella Long RN at 2/20/2019  9:20 AM     Author: Gabriella Long RN Service: -- Author Type: Registered Nurse    Filed: 2/20/2019  2:58 PM Encounter Date: 2/20/2019 Status: Addendum    : Gabriella Long RN (Registered Nurse)    Related Notes: Original Note by Gabriella Long RN (Registered Nurse) filed at 2/20/2019  2:25 PM       Patient calls to request a refill of gabapentin, also requesting a c/b from provider.      Call placed to patient:  Reviewed the course of things, patient had a fall but did not go to have this assessed immediately.  She reports following up with [facility] initially as she had a previous surgery with [facility].   She reports being  given some acute pain medications, oxycodone and dilaudid.  She reports her children then made her go to ED as her pain was out of control.  Patient reports her ED visit was quite frustrating and the only focus was that she is a pain patient and was informed that she would not get pain medications.  Patient has since used up additional pain medications from [facility], she continues with gabapentin(see documentation later in note(patient taking additional dose at times)), tramadol and reports is currently out of tizanidine.  Patient is resting, using ice and tylenol.  She reports that she is mostly in her bed and laying flat, she has difficulty with getting to the bathroom but does have a walker at home.  Patient is now scheduled for vertebroplasty this coming Monday, 2/25, at 0700 at [facility].  Patient sounds quite hopeful about this procedure, she is in good spirits.  Patient reports that if provider in the pain clinic is \"willing to take pity on her until Monday she does not get much relief from current medications\"  Reviewed that all of this information will be relayed to the provider.  Tizanidine appears not quite due at this time at current dosing instructions.  Patient will be out of gabapentin sooner than expected as she " reports taking an additional dose during the daytime on several occasions.  Patient reports that with all of her additional pain this extra dose has offered some additional pain control.  She reports being out of the gabapentin last refill on Friday,2/22.    Please review/que/ route back any further suggestions or recommendations.

## 2021-06-16 NOTE — TELEPHONE ENCOUNTER
"Telephone Encounter by Sandra Mckeon RN at 1/29/2019  2:55 PM     Author: Sandra Mckeon RN Service: -- Author Type: Registered Nurse    Filed: 1/29/2019  3:02 PM Encounter Date: 1/29/2019 Status: Signed    : Sandra Mckeon RN (Registered Nurse)       Pt calls to say she fell Friday night and has \"back pain besides the shoulder pain.\" Wants [facility] to know and is asking for Tramadol.     Last appt  09/28/18     Was supposed to follow-up in 2 months     Cancelled appts: 11/28, 01/02, 01/25      Please advise regarding withdrawal meds.       "

## 2021-06-17 NOTE — PROGRESS NOTES
PAIN CLINIC FOLLOW UP PROGRESS NOTE    CC:  Chief Complaint   Patient presents with     Back Pain     Pt stated one week ago Friday had a Heart Attack     Knee Pain       HPI  Minerva Blanco is a 73 y.o. female who presents for evaluation   Chief Complaint   Patient presents with     Back Pain     Pt stated one week ago Friday had a Heart Attack     Knee Pain    that is causing continued pain. Since the last visit the patient denies any trips to the urgent care or ED specifically for their pain. The patient denies any new medications, diagnoses since the last visit. Specific questions indicated the patient wanted addressed today include: follow up on her pain medications as well as the update on her recent MI.       Major issues:  1. Chronic pain syndrome    2. Chronic intractable pain        Today the pain is located in their abdominal pain and bilateral foot pain and is described as mostly being gone but burning in the feet when it is aggravated it lasts for constant, and is rated at a 4 on a scale of 1-10  Associated symptoms: Denies any loss of bladder control, fevers/chills, unintentional weight loss, weakness, numbness or pain that interferes with sleep.   Aggravating factors include: increased pain  Alleviating factors: changing positions and rest  Adverse effects of medications: NONE   Functional symptoms:F8 affects sleep, ADLs socialization.   Current subjective treatment efficacy: Fair      Previous Medical History  History   Alcohol Use No     History   Drug Use No     History   Smoking Status     Former Smoker     Packs/day: 1.00     Years: 15.00     Types: Cigarettes     Quit date: 1/1/1998   Smokeless Tobacco     Never Used       Pertinent Pain Medications/interventions:  She currently takes oxycodone 10 mg 5 times a day and gabapentin 400 mg once in the am and 800 mg at bedtime.     Review of Systems:  12 point systems were reviewed with pt as documented on pt health form and the patient endorses  any new diagnosis of a MI on 4/20/2018,  no other changes in 12 point system review since the last visit.     Physical Exam  Vitals:    04/30/18 1108   BP: 113/65   Patient Site: Right Arm   Patient Position: Sitting   Pulse: 86   Resp: 16   Weight: 101 lb (45.8 kg)   Height: 5' (1.524 m)     General- patient is alert and oriented, in NAD, well-groomed, well-nourished  Psych- Judgment and insight normal, AOx4, recent and remote memory normal, mood and affect normal  Eyes- pupils are equal and reactive, conjunctiva is clear bilaterally, no ptosis is noted.   Respiratory- breathing is non-labored  Cardiovascular- extremities warm and well perfused, no peripheral edema or varicosities.  Musculoskeletal- gait is normal, extremities with no joint swelling, erythema, or warmth.  Neuro- normal strength, no gait abnormalities, normal sensation to pain, temperature, light touch.  Integumentary- no rashes, dermatitis or discolorations noted throughout, no open wounds noted. Dried blood in the chest wound that appears closed at this time.     Medications    Current Outpatient Prescriptions:      acetaminophen (TYLENOL) 325 MG tablet, Take 650 mg by mouth every 4 (four) hours as needed for pain., Disp: , Rfl:      aspirin 81 mg chewable tablet, Chew 1 tablet (81 mg total) daily., Disp: , Rfl: 0     atorvastatin (LIPITOR) 80 MG tablet, Take 1 tablet (80 mg total) by mouth daily., Disp: 30 tablet, Rfl: 0     BENEFIBER, GUAR GUM, ORAL, Take by mouth daily. 2 teaspoonfuls in 4-8 oz liquid, Disp: , Rfl:      diphenhydrAMINE (BENADRYL) 25 mg tablet, Take 25-50 mg by mouth at bedtime as needed for sleep., Disp: , Rfl:      ferrous sulfate 325 (65 FE) MG tablet, Take 1 tablet by mouth daily with breakfast., Disp: , Rfl:      gabapentin (NEURONTIN) 400 MG capsule, Take 400 mg in the morning and 800 mg at bedtime (Patient taking differently: Take 800 mg by mouth at bedtime. Take 400 mg in the morning and 800 mg at bedtime), Disp: 90  capsule, Rfl: 2     Lactobacillus rhamnosus GG (CULTURELLE) 10-15 Billion cell capsule, Take 1 capsule by mouth 2 (two) times a day. 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 , Disp: , Rfl:      Medical Cannabis, Use 1 Units As Directed. Take as instructed by the medical cannabis dispensary. The provider who enrolled the patient in the medical cannabis registry and certified this  patient will maintain ownership and liability of all provider reporting and registration requirements., Disp: , Rfl:      melatonin 10 mg Tab, Take 10 mg by mouth at bedtime., Disp: , Rfl:      metoprolol succinate (TOPROL-XL) 50 MG 24 hr tablet, Take 1 tablet (50 mg total) by mouth 2 (two) times a day., Disp: 60 tablet, Rfl: 0     multivitamin with minerals (THERA-M) 9 mg iron-400 mcg Tab tablet, Take 1 tablet by mouth daily., Disp: , Rfl:      omeprazole (PRILOSEC) 20 MG capsule, Take 20 mg by mouth 2 (two) times a day before meals., Disp: , Rfl:      simethicone (GAS RELIEF) 125 mg cap capsule, Take 125 mg by mouth every 6 (six) hours as needed for flatulence., Disp: , Rfl:      ticagrelor (BRILINTA) 90 mg Tab, Take 1 tablet (90 mg total) by mouth 2 (two) times a day., Disp: 60 tablet, Rfl: 0     traZODone (DESYREL) 100 MG tablet, Take 100 mg by mouth at bedtime., Disp: , Rfl:      hydrOXYzine pamoate (VISTARIL) 25 MG capsule, Take 1 capsule (25 mg total) by mouth 3 (three) times a day as needed for itching (only take if needed for withdrawl symptoms)., Disp: 90 capsule, Rfl: 0     oxyCODONE (ROXICODONE) 10 mg immediate release tablet, Take 1 tablet (10 mg total) by mouth 5 x daily PRN for pain (only take it as needed.)., Disp: 119 tablet, Rfl: 0    Lab:  Last UDS on 10/04/2017 was positive for the currently prescribed narcotics. Please see the scanned document from the outside lab. This was reviewed with the patient.      Imaging:  No new imaging.      Recent   Dated 4/30/2018 was reviewed with the patient today.   [facility]  Date: 18  Query Report Page#: 1  Patient Rx History Report  BEN BALLESTEROS  Search Criteria: Last Name 'ben' and First Name 'ashley' and  = ' and Request Period = '  to ' - 4 out of 4 Recipients Selected.  Fill Date Product, Str, Form Qty Days Pt ID Prescriber Written RX# N/R* Pharm **MED+  ---------- -------------------------------- ------ ---- --------- ---------- ---------- ------------ ----- --------- ------  2018 GABAPENTIN 400 MG CAPSULE 90.00 30 [id number] 2018 [id number] R [id number] 00.0  2018 OXYCODONE HCL 10 MG TABLET 112.00 19 [id number] N [id number] 88.42  2018 OXYCODONE HCL 10 MG TABLET 112.00 14 [id number] 2018 [id number] N [id number] 120.0  2018 GABAPENTIN 400 MG CAPSULE 90.00 30 [id number] 2018 [id number] N [id number] UNK  2018 OXYCODONE HCL 10 MG TABLET 180.00 30 [id number] N [id number] 90.0  2018 OXYCODONE HCL 10 MG TABLET 60.00 10 [id number] 2018 [id number] N [id number] 90.0  02/15/2018 GABAPENTIN 400 MG CAPSULE 60.00 30 [id number] 02/15/2018 [id number] N [id number] UNK  2018 OXYCODONE HCL 10 MG TABLET 60.00 10 [id number] 2018 [id number] N [id number] 90.0  2018 OXYCODONE HCL 5 MG/5 ML SOLN 30.00 1 [id number] 2018 [id number] N [id number] 45.0  2018 OXYCODONE HCL 10 MG TABLET 120.00 30 [id number] 2018 [id number] N [id number] 60.0  2018 GABAPENTIN 400 MG CAPSULE 60.00 30 [id number] 2018 [id number] N [id number] UNK  01/10/2018 OXYCODONE HCL 5 MG TABLET 180.00 30 [id number] 2018 [id number] N [id number] 45.0  01/10/2018 BUPRENORPHINE 10 MCG/HR PATCH 4.00 28 [id number] 2018 [id number] N [id number] UNK  2018 OXYCODONE HCL 5 MG TABLET 6.00 1 [id number] 2018 [id number] N [id number] 45.0  2017 TRAMADOL HCL 50 MG TABLET 40.00 10  [id number] 12/28/2017 [id number] N [id number] 20.0  12/19/2017 TRAMADOL HCL 50 MG TABLET 40.00 5 [id number] 12/19/2017 [id number] N [id number] 40.0  12/13/2017 OXYCODONE HCL 5 MG TABLET 180.00 30 [id number] 12/13/2017 [id number] N [id number] 45.0  12/13/2017 BUTRANS 10 MCG/HR PATCH 4.00 28 [id number] 12/13/2017 [id number] N [id number] K  11/29/2017 TRAMADOL HCL 50 MG TABLET 30.00 15 [id number] 11/29/2017 [id number] N [id number] 10.0  11/21/2017 HYDROCODONE-ACETAMIN 5-325 MG 30.00 8 [id number] 11/21/2017 [id number] N [id number] 18.75  11/15/2017 OXYCODONE HCL 5 MG TABLET 180.00 30 [id number] 11/15/2017 [id number] N [id number] 45.0  11/15/2017 BUTRANS 10 MCG/HR PATCH 4.00 28 [id number] 11/15/2017 [id number] N [id number] [facility]  11/15/2017 TRAMADOL HCL 50 MG TABLET 30.00 4 [id number] 11/15/2017 [id number] N [id number] 37.5  11/15/2017 DIPHENOXYLATE-ATROP 2.5-0.025 40.00 14 [id number] 10/26/2017 [id number] R [id number] K  11/09/2017 OXYCODONE HCL 5 MG TABLET 30.00 3 [id number] 11/09/2017 [id number] N [id number] 75.0  11/08/2017 OXYCODONE HCL 5 MG TABLET 20.00 4 [id number] 11/08/2017 [id number] N [id number] 37.5  11/03/2017 TRAMADOL HCL 50 MG TABLET 20.00 7 [id number] 11/03/2017 [id number] N [id number] 14.29  10/28/2017 DIPHENOXYLATE-ATROP 2.5-0.025 40.00 14 [id number] 10/26/2017 [id number] N [id number] K  10/24/2017 TRAMADOL HCL 50 MG TABLET 40.00 5 [id number] 10/24/2017 [id number] N [id number] 40.0  10/23/2017 BUTRANS 7.5 MCG/HR PATCH 4.00 28 [id number] 10/20/2017 [id number] N [id number] UNK  10/17/2017 OXYCODONE HCL 5 MG TABLET 180.00 30 [id number] 10/17/2017 [id number] N [id number] 45.0  10/13/2017 TRAMADOL HCL 50 MG TABLET 30.00 5 [id number] 10/13/2017 [id number] N [id number] 30.0  10/05/2017 OXYCODONE HCL 5 MG TABLET 84.00 14 [id number] 10/05/2017 [id number] N [id number] 45.0  10/03/2017 DIPHENOXYLATE-ATROP 2.5-0.025 40.00 10  [id number] 09/21/2017 [id number] R [id number] UNK  10/02/2017 TRAMADOL HCL 50 MG TABLET 40.00 5 [id number] 10/02/2017 [id number] N [id number] 40.0  10/02/2017 OXYCODONE HCL 5 MG TABLET 15.00 3 [id number] 10/02/2017 [id number] N [id number] 37.5  09/22/2017 TRAMADOL HCL 50 MG TABLET 40.00 8 [id number] 09/22/2017 [id number] N [id number] 25.0  09/22/2017 DIPHENOXYLATE-ATROP 2.5-0.025 40.00 10 [id number] 09/21/2017 [id number] N [id number] UNK  **Per CDC guidance, the conversion factors and associated daily morphine milligram equivalents for drugs prescribed as part of  medication-assisted treatment for opioid use disorder should not be used to benchmark against dosage thresholds meant for opioids  prescribed for pain.  Report Disclaimers:  The report provided above is based upon the search criteria and the data provided by the dispensing entities. For more information  about any prescription, please contact the dispenser or the prescriber.  This report contains confidential information, including patient identifiers, and is not a public record. The information on this  report must be treated as protected health information and is to be disclosed to others only as authorized by applicable state  and Federal regulations.    Assessment:   Minerva Blanco is a 73 y.o. female seen in clinic today for   Chief Complaint   Patient presents with     Back Pain     Pt stated one week ago Friday had a Heart Attack     Knee Pain     Patient follows up the pain clinic today in regards to her opioid use.  Patient's goal was to discontinue all opioids by May 1.  Patient has recently been in the hospital due to an MI for which she had stents placed.  She states that her abdominal pain from her JPEG and her suture is no longer present in her thoracic pain from her open wound is no longer present since he took out the stent.  Patient does have a divot still in the anterior thoracic chest wall it does have a scab  present there is no drainage present today.  Patient states that initially her symptoms for the MI mimicked withdrawal symptoms however she had not started weaning her opioids at that point.  Keeping this in mind will dictate care as we slowly wean the patient down off the opioids per her request as she no longer feels that she needs opioids.  The weaning taper is listed below will have the patient follow-up in 4 weeks patient does have baseline diarrhea at this point as well as malnutrition.  Patient is continuing to take her gabapentin 400 mg in the morning and 800 mg at night.  Currently she is taking oxycodone 10 mg 5 tablets per day and this will continue for 7 days will reduce by 5 mg per day for 7 days at a time until we get her down to 0.  Patient agrees this plan will call if there are any problems or concerns    They are here for follow up and continued medical management of their pain. Today we reviewed the lab work of the UDT test and the results are expected because of the prescribed narcotics found in the urine drug screen.     I have also reviewed the information obtained from the last visit encounter after the REYES was signed and have asked any pertintent questions needed from other healthcare providers/family/patient to continue care and formulate a treatment plan.     Patients current MME is 75, 67.5, 60, 52.5  Patient to call clinic for next month's prescription 5 days in advance. Based on the patients response to their previous narcotic therapy and quality of life, which includes their participation in ADLs, I recommend that the narcotics therapy continue, however the changes listed below will be incorporated into their plan of care.     Patient set goals to   1. Continue to wean off of the opioids to maintain function    Plan:   Interventions-     Follow up in 4 weeks to evaluate the effectiveness of the treatment interventions ordered today.    Opioid weaning taper  Weaning taper 5 tabs for 7  days , then 4.5 tabs for 7 days, then 4 tabs for 7 days then 3.5 tabs for 7 days     You have updated me that you are no longer taking the medical cannabis     Dr. Nion is taking your lab values and working with them, continue this.      Will send in the vistaril for any withdrawal symptoms, otherwise do not take it.      Prescription Drug Management will be continued by the [facility]  A narcotic contract was signed by the patient and  Patient is unable to get narcotics from other providers. They will be subject to random UAs.      Orders placed today  Medications that were ordered today   Requested Prescriptions     Signed Prescriptions Disp Refills     oxyCODONE (ROXICODONE) 10 mg immediate release tablet 119 tablet 0     Sig: Take 1 tablet (10 mg total) by mouth 5 x daily PRN for pain (only take it as needed.).     hydrOXYzine pamoate (VISTARIL) 25 MG capsule 90 capsule 0     Sig: Take 1 capsule (25 mg total) by mouth 3 (three) times a day as needed for itching (only take if needed for withdrawl symptoms).     No orders of the defined types were placed in this encounter.      UDT/SWAB:  Patient required a random Urine Drug Testing, due to the need to comply with [place] Model Policy Guidelines and CDC Guideline for the use of any controlled substances. This is to ensure that patient is compliant with treatment, and monitor for risks such as diversion, abuse, or any other aberrant behaviors. Patient is either being considered for or taking a controlled substance. Unexpected findings will be discussed and treatment decision may be adjusted. Testing is being implemented across the board randomly w/o bias related to age, race, gender, socioeconomic status or [employer]ism affiliation.    The patient understand todays plan and has their questions answered in regards to expectations and current treatment plan.     SAFETY REMINDERS  No alcohol while taking controlled substances. Alcohol is not an illegal  substance, it is unsafe to use in combination. It is a build up of substances in the body that can be extremely hazardous and may cause respirations to slow to a dangerous rate resulting in hospitalization, brain damage, or death.    Opioid medications have been associated with sharp rise in unintentional overdose and death.  Overdose is a condition characterized by the consumption in excess of a particular drug causing adverse effects. This can happen b/c you are sick, accidentally or intentionally took an extra dose, are on multiple medication that can interact. Someone took your medication and they are not use to the medication.  Symptoms of overdose include:   !breathing slow and shallow, erratic or not at all  !pinpoint pupils, hallucinations  !confusion  !muscle jerks, slack muscles   !extreme sleepiness or loss of alertness   !awake but not able to talk   !face pale or clammy, vomiting, for lighter skinned people, the skin tone turns bluish purple, for darker skinned people, it turns grayish or ashen   If in a situation where overdose is a concern engage the emergency response system (dial 911).    In one study it was noted that 80% of unintentional overdoses occurred in people who were taking a combination of opioids and benzodiazepines.    Do not sell, loan, borrow or share your opioid medication with anyone. Deaths have occurred as a result of this practice. It is illegal and patients are being prosecuted.     Prevent unexpected access/loss of medication: Keep medication locked. Only carry what you need with you.    Millie Weaver, [facility]  [address]  [place] [address]  Ph: [phone]  Fax: [phone]

## 2021-06-18 NOTE — PROGRESS NOTES
ITP ASSESSMENT   Assessment Day: Initial  Session Number: 1 of 12-24  Precautions: standard cardiac / Esophageal  infection, s/p stoma  Diagnosis: MI;Stent  Risk Stratification: Medium  Referring Provider: Hudson Atkinson MD  EXERCISE  Exercise Assessment: Initial     2.5 Minute Walk Test   Pre   Pre Exercise HR: 76                  Pre Exercise BP: 98/65    Peak  Peak HR: 81                 Peak BP: 130/69  Peak feet: 400  Peak O2 SAT: 99  Peak RPE: 12  Peak MPH: 0.76    Symptoms:  Peak Symptoms: became light headed, tired Needs reminder to relax, breathe.    5 mins. Post  5 Min Post HR: 65  5 Min Post BP: 129/60                         Exercise Plan  Goals Next 30 days  ADL'S: to resume driving after pain meds DCd, assist with planting vegs and flowers with rest as needed  Leisure: return to regular [employer] attendance, go camping in [employer] cam[employer], resume swimming in pool when chest wound healed and given ok by MD  Work: return to volunteering at [employer]       Education Goals: Patient can state cardiac s/s and appropriate emergency response.  Education Goals Met: Has system for taking medication.;Medication review.    Exercise Prescription  Exercise Mode: Treadmill;Bike;Nustep;Arm Erg.  Frequency: 2-3 x weekly  Duration: 35-45 min  Intensity / THR: 20-30 beats above resting heart rate  RPE 11-14  Progression / Met level: 3-4 mets  Resistive Training?: Yes    Current Exercise (mins/week): 1    Interventions  Home Exercise:  Mode: walking   Frequency: 4-6 x weekly  Duration: 10-15 min 2-3 x day as tolerated.    Education Material : Educational videos;Provide written material;Individual education and counseling;Offer educational classes    Education Completed  Exercise Education Completed: Signs and Symptoms;Medication review;RPE;Emergency Plan;Home Exercise;Warm up/cool down;BP/HR Reponse to exercise;End point of exercise            Exercise Follow-up/Discharge  Follow up/Discharge: pt deconditioned from 2 years of  medical problems and now anxious to gradually regain strength, energy NUTRITION  Nutrition Assessment: Initial    Nutrition Risk Factors:  Nutrition Risk Factors: Dyslipidemia  Cholesterol: 100  LDL: 47  HDL: 36  Triglycerides: 83    Nutrition Plan  Interventions    Other Nutrition Intervention: Diet Class;Therapist/Pt Discussion;Educational Videos;Provide with Written Material      Education Completed  Nutrition Education Completed: Discussed small frequent meals and nutritional supplements    Goals  Nutrition Goals (Next 30 days): Provide Rate your Plate Survey;Review Dietitian schedule;Patient knows appropriate portion size;Patient can identify their risk factors for CAD    Goals Met  Nutrition Goals Met: Patient will maintain current weight or gradual weight gain;Patient can identify their risk factors for CAD;Patient knows appropriate portion size    Height, Weight, and  BMI  Weight: 94 lb 8 oz (42.9 kg)  Height: 5' (1.524 m)  BMI: 18.46    Nutrition Follow-up  Follow-up/Discharge: pt has appetite, but reports that stomach has shrunk due to esophageal infection / surgery so fills up fast       Other Risk Factors  Other Risk Factor Assessment: Initial    HTN Risk Factor: Hypertension    Pre Exercise BP: 98/65  Post Exercise BP: 129/60    Hypertension Plan  Goals  HTN Goals: Exercises regularly    Goals Met  HTN Goals Met: Follow low sodium diet;Take medication as prescribed    HTN Interventions  HTN Interventions: Diet consult;Therapist/patient discussion;Provide written material;Offer educational videos    HTN Education Completed  HTN Education Completed: Medication review    Tobacco Risk Factor: NA          Risk Factor Follow-up   Follow-up/Discharge: managing BP by taking meds as prescribed   PSYCHOSOCIAL  Psychosocial Assessment: Initial     [facility] Q of L Summary Score: 23    AUID-D Score: 4    Psychosocial Risk Factor: Stress    Psychosocial Plan  Interventions  If AUDI-D > 15 send letter to  MD  Interventions: Individual education and counseling    Education Completed  Education Completed: Relaxation/Coping Techniques    Goals  Goals (Next 30 days): Practicing stress management skills    Goals Met  Goals Met: Identified Support system;Identify stressors;Practicing stress management skills    Psychosocial Follow-up  Follow-up/Discharge: Pt feels she's trying to manage stress by being positive about small steps forward           Patient involved in Goal setting?: Yes    Signature: _____________________________________________________________    Date: __________________    Time: __________________

## 2021-06-18 NOTE — PROGRESS NOTES
Minerva BARBARA Blanco has participated in 9 sessions of Phase II Cardiac Rehab.    Progress Report:   Cardiac Rehab Treatment Progress Report  6/7/2018 6/12/2018 6/14/2018 6/19/2018   Weight  97 lbs 97 lbs 10 oz 96 lbs 3 oz 96 lbs 6 oz   Pre Exercise  HR  79 74 80 66   Pre Exercise BP - 110/60 120/68 96/56 120/60   Treadmill Peak HR - - - 83 83   Treadmill Peak Blood Pressure - - - 120/66 118/60   Heart Rate - 83 80 78 76   Post Exercise BP - 90/60 102/60 92/60 120/60   Post Blood Sugar (mg/dl) - - - - -   ECG - SR SR SR SR   Total Exercise Minutes - 27 30 40 41         Current Status:  Currently exercising without complaints or symptoms.    If Physician recommends change in treatment plan, please place orders.        __________________________________________________      _____________  Signature                                                                                                  Date

## 2021-06-18 NOTE — PROGRESS NOTES
ITP ASSESSMENT   Assessment Day: 60 Day  Session Number: 9 of 10  Precautions: Last Day  Diagnosis: MI;Stent  Risk Stratification: Medium  Referring Provider: Hudson Atkinson MD  EXERCISE  Exercise Assessment: Discharge     6 Minute Walk Test   Pre   Pre Exercise HR: 66                  Pre Exercise BP: 120/60    Peak  Peak HR: 85                 Peak BP: 134/76  Peak feet: 1200  Peak O2 SAT: 96  Peak RPE: 11-12  Peak MPH: 2.27    Symptoms:  Peak Symptoms: slightly winded    5 mins. Post  5 Min Post HR: 66  5 Min Post BP: 133/84                           Education Goals: All goals in this section met  Education Goals Met: Patient can state cardiac s/s and appropriate emergency response.;Has system for taking medication.;Medication review.                        Goals Met  30 day ADL'S goals met: t. has been able to begin prepareing for houseboat trip without fatigue or sx.  30 day Work goals met: Pt. has resumed her social outings , she has learned to pace herself.  30 Day Progression: Pt. has not started walking in the pool , but has a plan to begin soon.  She states she feels like she has the energy to resume the walking, when she returns from vacation.She is pacing herself well.    Initial ADL's goals met: Not yet returned to driving.  Pt currently weaning dose of oxy for pain.  Plans to return to driving when done with pain meds. Goal of planting flowers met with assit of her .  Initial Leisure goals met: Goals not met due to schedule conflicts and low energy  Intial Work goals met: goals not met  Initial Progression: Pt has made progress with gaining confidence to participate in activities with improved confidence,  Pt continues to demo carryover with home exercise program.      Exercise Prescription  Exercise Mode: Treadmill;Nustep;Arm Erg.  Frequency: 2-3x's/week  Duration: 30-45 min  Intensity / THR: 20-30 beats above resting heart rate  RPE 11-14  Progression / Met level: 3-4 mets  Resistive  Training?: Yes    Current Exercise (mins/week): 120    Interventions  Home Exercise:  Mode: walking  Frequency: Daily  Duration: 15-30 min    Education Material : Educational videos;Provide written material;Individual education and counseling;Offer educational classes    Education Completed  Exercise Education Completed: Signs and Symptoms;Medication review;RPE;Emergency Plan;Home Exercise;Warm up/cool down;BP/HR Reponse to exercise;End point of exercise;FITT Principles            Exercise Follow-up/Discharge  Follow up/Discharge: Pt. States she understands her home program and will follow as able NUTRITION  Nutrition Assessment: Discharge    Nutrition Risk Factors:  Nutrition Risk Factors: Dyslipidemia  Cholesterol: 100  LDL: 47  HDL: 36  Triglycerides: 83    Nutrition Plan  Interventions  Diet Consult: Completed  Other Nutrition Intervention: Diet Class;Therapist/Pt Discussion;Educational Videos;Provide with Written Material  Initial Rate Your Plate Score: 57    Education Completed  Nutrition Education Completed: Low Saturated fat diet;Discussed small frequent meals and nutritional supplements      Goals Met  Nutrition Goals Met: Provided Rate your Plate Survey;Patient can identify their risk factors for CAD;Patient will maintain current weight or gradual weight gain;Patient follows a low sodium diet;Completed Nutritional Risk Screen;Reviewed Dietitian schedule    Height, Weight, and  BMI  Weight: 96 lb 6.4 oz (43.7 kg)  Height: 5' (1.524 m)  BMI: 18.83    Nutrition Follow-up  Follow-up/Discharge: Patient has been trying to eat small frequent meals and has added a protein supplement.  Patient has reflux issues and is keeping a food and sympton log that she will share with her physician.        Other Risk Factors  Other Risk Factor Assessment: Discharge    HTN Risk Factor: Hypertension    Pre Exercise BP: 120/60  Post Exercise BP: 120/60      Goals Met  HTN Goals Met: Follow low sodium diet;Take medication as  prescribed;Exercises regularly    HTN Interventions  HTN Interventions: Diet consult;Therapist/patient discussion;Provide written material;Offer educational videos    HTN Education Completed  HTN Education Completed: Medication review    Tobacco Risk Factor: NA      Risk Factor Follow-up   Follow-up/Discharge: managing BP by taking meds as prescribed   PSYCHOSOCIAL  Psychosocial Assessment: Discharge     Pt. Did not attend rehab her last session so AUDI-D and COOP were not administered.  Psychosocial Risk Factor: Stress    Psychosocial Plan  Interventions  Interventions: Individual education and counseling;Provide written material;Offer educational videos and classes    Education Completed  Education Completed: Relaxation/Coping Techniques    Goals Met  Goals Met: Identified Support system;Identify stressors;Practicing stress management skills    Psychosocial Follow-up  Follow-up/Discharge: Pt. has had a roller coaster recovery emotionally. She has been frusrated and sad at times.  She does state she has the tools to cope appropriately.  She takes each day a step at a time.           Patient involved in Goal setting?: Yes    Signature: _____________________________________________________________    Date: __________________    Time: __________________

## 2021-06-18 NOTE — PROGRESS NOTES
PAIN CLINIC FOLLOW UP PROGRESS NOTE    CC:  Chief Complaint   Patient presents with     Back Pain     Knee Pain       HPI  Minerva Blanco is a 73 y.o. female who presents for evaluation   Chief Complaint   Patient presents with     Back Pain     Knee Pain    that is causing continued pain. Since the last visit the patient denies any trips to the urgent care or ED specifically for their pain. The patient denies any new medications, diagnoses since the last visit. Specific questions indicated the patient wanted addressed today include: her ongoing chronic pain needs.     Major issues:  1. Chronic pain syndrome    2. Chronic intractable pain      Today the pain is located in their multi-site joint pain  and is described as  when it is aggravated it lasts for intermittent flares, and is rated at a 2 on a scale of 1-10  Associated symptoms: Denies any loss of bladder control, fevers/chills, unintentional weight loss, weakness, numbness or pain that interferes with sleep.   Aggravating factors include: increased stress   Alleviating factors: rest and changing positions   Adverse effects of medications: NONE   Functional symptoms:F4 affects her sleep and activities some days  Current subjective treatment efficacy: Excellent      Previous Medical History  History   Alcohol Use No     History   Drug Use No     History   Smoking Status     Former Smoker     Packs/day: 1.00     Years: 15.00     Types: Cigarettes     Quit date: 1/1/1998   Smokeless Tobacco     Never Used       Pertinent Pain Medications/interventions:  She currently takes oxycodone 10 mg three times a day gabapentin at bedtime    Review of Systems:  12 point systems were reviewed with pt as documented on pt health form and the patient denies any new diagnosis or changes in 12 point system review since the last visit.     Physical Exam  Vitals:    05/30/18 1106   BP: 100/60   Patient Site: Right Arm   Patient Position: Sitting   Cuff Size: Adult Regular    Pulse: 77   Resp: 18   Weight: (!) 96 lb (43.5 kg)   Height: 5' (1.524 m)     General- patient is alert and oriented, in NAD, well-groomed, well-nourished  Psych- Judgment and insight normal, AOx4, recent and remote memory normal, mood and affect normal  Eyes- pupils are equal and reactive, conjunctiva is clear bilaterally, no ptosis is noted.   Respiratory- breathing is non-labored  Cardiovascular- extremities warm and well perfused, no peripheral edema or varicosities.  Musculoskeletal- gait is normal, extremities with no joint swelling, erythema, or warmth.  Neuro- normal strength, no gait abnormalities, normal sensation to pain, temperature, light touch.  Integumentary- no rashes, dermatitis or discolorations noted throughout, no open wounds noted.    Medications    Current Outpatient Prescriptions:      acetaminophen (TYLENOL) 500 MG tablet, Take 500 mg by mouth every 6 (six) hours as needed for pain., Disp: , Rfl:      aspirin 81 mg chewable tablet, Chew 1 tablet (81 mg total) daily., Disp: , Rfl: 0     atorvastatin (LIPITOR) 80 MG tablet, Take 1 tablet (80 mg total) by mouth daily., Disp: 90 tablet, Rfl: 3     BENEFIBER, GUAR GUM, ORAL, Take by mouth daily. 2 teaspoonfuls in 4-8 oz liquid, Disp: , Rfl:      diphenhydrAMINE (BENADRYL) 25 mg tablet, Take 25-50 mg by mouth at bedtime as needed for sleep., Disp: , Rfl:      diphenoxylate-atropine (LOMOTIL) 2.5-0.025 mg per tablet, Take 1 tablet by mouth 2 (two) times a day as needed for diarrhea., Disp: , Rfl:      ferrous sulfate 325 (65 FE) MG tablet, Take 1 tablet by mouth daily with breakfast., Disp: , Rfl:      gabapentin (NEURONTIN) 400 MG capsule, Take 400 mg in the morning and 800 mg at bedtime (Patient taking differently: Take 800 mg by mouth at bedtime. Take 400 mg in the morning and 800 mg at bedtime), Disp: 90 capsule, Rfl: 2     Lactobacillus rhamnosus GG (CULTURELLE) 10-15 Billion cell capsule, Take 1 capsule by mouth 2 (two) times a day.  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 , Disp: , Rfl:      loperamide (IMODIUM) 2 mg capsule, Take 2 mg by mouth 4 (four) times a day as needed for diarrhea., Disp: , Rfl:      melatonin 10 mg Tab, Take 10 mg by mouth at bedtime., Disp: , Rfl:      metoprolol succinate (TOPROL-XL) 50 MG 24 hr tablet, Take 1 tablet (50 mg total) by mouth 2 (two) times a day., Disp: 180 tablet, Rfl: 3     multivitamin with minerals (THERA-M) 9 mg iron-400 mcg Tab tablet, Take 1 tablet by mouth daily., Disp: , Rfl:      omeprazole (PRILOSEC) 20 MG capsule, Take 20 mg by mouth 2 (two) times a day before meals., Disp: , Rfl:      simethicone (GAS RELIEF) 125 mg cap capsule, Take 125 mg by mouth every 6 (six) hours as needed for flatulence., Disp: , Rfl:      ticagrelor (BRILINTA) 90 mg Tab, Take 1 tablet (90 mg total) by mouth 2 (two) times a day., Disp: 180 tablet, Rfl: 3     traZODone (DESYREL) 100 MG tablet, Take 150 mg by mouth at bedtime. , Disp: , Rfl:      meclizine (ANTIVERT) 25 mg tablet, Take 1 tablet (25 mg total) by mouth 2 (two) times a day as needed., Disp: 30 tablet, Rfl: 0     oxyCODONE (ROXICODONE) 5 MG immediate release tablet, Take 1 tablet (5 mg total) by mouth every 4 (four) hours as needed for pain., Disp: 107 tablet, Rfl: 0     TiZANidine (ZANAFLEX) 4 MG capsule, Take 1 capsule (4 mg total) by mouth 3 (three) times a day., Disp: 90 capsule, Rfl: 0    Lab:  Last UDS on 2018 had expected results. Any abnormal results have been discussed with the patient and may change the plan of care. Please see the scanned document from the outside lab under the media tab. This was reviewed with the patient.      Imaging:  No new imaging.      Recent   Dated 2018 was reviewed with the patient today.   [employer] [place] Date: 18  Query Report Page#: 1  Patient Rx History Report  BEN BALLESTEROS  Search Criteria: Last Name 'ben' and First Name 'ashley' and  = '[birth year]' and Request Period = '17'  to  '05/29/18' - 5 out of 5 Recipients Selected.  Fill Date Product, Str, Form Qty Days Pt ID Prescriber Written RX# N/R* Pharm **MED+  ---------- -------------------------------- ------ ---- --------- ---------- ---------- ------------ ----- --------- ------  05/18/2018 GABAPENTIN 400 MG CAPSULE 90.00 30 [id number] 03/20/2018 [id number] R [id number] 00.0  05/11/2018 DIPHENOXYLATE-ATROP 2.5-0.025 30.00 15 [id number] 05/11/2018 [id number] N [id number] 00.0  04/30/2018 OXYCODONE HCL 10 MG TABLET 119.00 28 [id number] 04/30/2018 [id number] N [id number] 63.75  04/28/2018 DIPHENOXYLATE-ATROP 2.5-0.025 30.00 15 [id number] 04/27/2018 [id number] N [id number] 00.0  04/13/2018 GABAPENTIN 400 MG CAPSULE 90.00 30 [id number] 03/20/2018 [id number] R [id number] 00.0  04/11/2018 OXYCODONE HCL 10 MG TABLET 112.00 19 [id number] 04/11/2018 [id number] N [id number] 88.42  03/29/2018 OXYCODONE HCL 10 MG TABLET 112.00 14 [id number] 03/20/2018 [id number] N [id number] 120.0  03/20/2018 GABAPENTIN 400 MG CAPSULE 90.00 30 [id number] 03/20/2018 [id number] N [id number] UNK  03/06/2018 OXYCODONE HCL 10 MG TABLET 180.00 30 [id number] 03/06/2018 [id number] N [id number] 90.0  02/24/2018 OXYCODONE HCL 10 MG TABLET 60.00 10 [id number] 02/19/2018 [id number] N [id number] 90.0  02/15/2018 GABAPENTIN 400 MG CAPSULE 60.00 30 [id number] 02/15/2018 [id number] N [id number] UNK  02/14/2018 OXYCODONE HCL 10 MG TABLET 60.00 10 [id number] 02/14/2018 [id number] N [id number] 90.0  02/12/2018 OXYCODONE HCL 5 MG/5 ML SOLN 30.00 1 [id number] 02/12/2018 [id number] N [id number] 45.0  01/28/2018 OXYCODONE HCL 10 MG TABLET 120.00 30 [id number] 01/25/2018 [id number] N [id number] 60.0  01/25/2018 GABAPENTIN 400 MG CAPSULE 60.00 30 [id number] 01/25/2018 [id number] N [id number] UNK  01/10/2018 OXYCODONE HCL 5 MG TABLET 180.00 30 [id number] 01/04/2018 [id number] N [id number] 45.0  01/10/2018 BUPRENORPHINE 10 MCG/HR  PATCH 4.00 28 [id number] 01/04/2018 [id number] N [id number] K  01/07/2018 OXYCODONE HCL 5 MG TABLET 6.00 1 [id number] 01/07/2018 [id number] N [id number] 45.0  12/28/2017 TRAMADOL HCL 50 MG TABLET 40.00 10 [id number] 12/28/2017 [id number] N [id number] 20.0  12/19/2017 TRAMADOL HCL 50 MG TABLET 40.00 5 [id number] 12/19/2017 [id number] N [id number] 40.0  12/13/2017 OXYCODONE HCL 5 MG TABLET 180.00 30 [id number] 12/13/2017 [id number] N [id number] 45.0  12/13/2017 BUTRANS 10 MCG/HR PATCH 4.00 28 [id number] 12/13/2017 [id number] N [id number] [facility]  11/29/2017 TRAMADOL HCL 50 MG TABLET 30.00 15 [id number] 11/29/2017 [id number] N [id number] 10.0  11/21/2017 HYDROCODONE-ACETAMIN 5-325 MG 30.00 8 [id number] 11/21/2017 [id number] N [id number] 18.75  11/15/2017 OXYCODONE HCL 5 MG TABLET 180.00 30 [id number] 11/15/2017 [id number] N [id number] 45.0  11/15/2017 BUTRANS 10 MCG/HR PATCH 4.00 28 [id number] 11/15/2017 [id number] N [id number] [facility]  11/15/2017 TRAMADOL HCL 50 MG TABLET 30.00 4 [id number] 11/15/2017 [id number] N [id number] 37.5  11/15/2017 DIPHENOXYLATE-ATROP 2.5-0.025 40.00 14 [id number] 10/26/2017 [id number] R [id number] UN  11/09/2017 OXYCODONE HCL 5 MG TABLET 30.00 3 [id number] 11/09/2017 [id number] N [id number] 75.0  11/08/2017 OXYCODONE HCL 5 MG TABLET 20.00 4 [id number] 11/08/2017 [id number] N [id number] 37.5  11/03/2017 TRAMADOL HCL 50 MG TABLET 20.00 7 [id number] 11/03/2017 [id number] N [id number] 14.29  10/28/2017 DIPHENOXYLATE-ATROP 2.5-0.025 40.00 14 [id number] 10/26/2017 [id number] N [id number] K  10/24/2017 TRAMADOL HCL 50 MG TABLET 40.00 5 [id number] 10/24/2017 [id number] N [id number] 40.0  10/23/2017 BUTRANS 7.5 MCG/HR PATCH 4.00 28 [id number] 10/20/2017 [id number] N [id number] [facility]  10/17/2017 OXYCODONE HCL 5 MG TABLET 180.00 30 [id number] 10/17/2017 [id number] N [id number] 45.0  10/13/2017 TRAMADOL HCL 50 MG TABLET  30.00 5 [id number] 10/13/2017 [id number] N [id number] 30.0  10/05/2017 OXYCODONE HCL 5 MG TABLET 84.00 14 [id number] 10/05/2017 [id number] N [id number] 45.0  10/03/2017 DIPHENOXYLATE-ATROP 2.5-0.025 40.00 10 [id number] 09/21/2017 [id number] R [id number] UNK  **Per CDC guidance, the conversion factors and associated daily morphine milligram equivalents for drugs prescribed as part of  medication-assisted treatment for opioid use disorder should not be used to benchmark against dosage thresholds meant for opioids  prescribed for pain.  Report Disclaimers:  The report provided above is based upon the search criteria and the data provided by the dispensing entities. For more information  about any prescription, please contact the dispenser or the prescriber.  This report contains confidential information, including patient identifiers, and is not a public record. The information on this  report must be treated as protected health information and is to be disclosed to others only as authorized by applicable state  and Federal regulations.    Assessment:   Minerva Blanco is a 73 y.o. female seen in clinic today for   Chief Complaint   Patient presents with     Back Pain     Knee Pain     Patient reports that she is doing better overall and is now taking 3- 10 mg tablets a day as needed for pain, her pain if mostly a 2/10 when needed. Patient reports that she is able to get through most of her day but states she still has some bad days and feels that even though her chest pain and G-tube site pain have been eliminated that she does not need the high level of opioids. Will wean patient off of the opioids at this time in a slow fashion to avoid any withdrawal symptoms. Patient denies any symptoms now. Patient does have burning in her feet bilaterally and is taking gabapentin at night as needed for this, she reports that this is very helpful. The dosing is low as it is to avoid any daytime drowsiness that she  has experienced in the past. Patient feels that overall she is doing much better and is looking forward to the weaning process. As we move forward with the reduction the patient will report back if there are other chronic pains that worsen such as her neuropathic type pain. If so this can be revisited at another appointment, she is always welcome to continue her care.     Patients current MME is 63  Patient to call clinic for next month's prescription 5 days in advance. Based on the patients response to their previous narcotic therapy and quality of life, which includes their participation in ADLs, I recommend that the narcotics therapy continue, however the changes listed below will be incorporated into their plan of care.     Patient set goals to   1. To get off of opioids and resume normal function.     Plan:   Interventions-    Follow up in 4 weeks or as needed  to evaluate the effectiveness of the treatment interventions ordered today.     Your goal is to be off of the pain medications or as low as possible. Today we have transitioned you to a lower medication dose to 5 mg, will reduce to 5 per day for 7 days, then decrease to 4 per day for 7 days, then decrease to 3 per day for 7 days then reduce to 2 per day for 7 days then reduce to 1/2 a tab twice per day for 7 days, then reduce to 1/2 a tab for 4 days, then you are done.     meclizine can be found over the counter for any nausea medications needed for your boat trip, you just take it once a day.     If your pain continues then follow back up with me to evaluate the ongoing need for management     I have refilled the tizanidine for your muscle relaxer as needed.     Prescription Drug Management will be continued by the [facility]  A narcotic contract was signed by the patient and  Patient is unable to get narcotics from other providers. They will be subject to random UAs.      Orders placed today  Medications that were ordered today   Requested  Prescriptions     Signed Prescriptions Disp Refills     TiZANidine (ZANAFLEX) 4 MG capsule 90 capsule 0     Sig: Take 1 capsule (4 mg total) by mouth 3 (three) times a day.     oxyCODONE (ROXICODONE) 5 MG immediate release tablet 107 tablet 0     Sig: Take 1 tablet (5 mg total) by mouth every 4 (four) hours as needed for pain.     meclizine (ANTIVERT) 25 mg tablet 30 tablet 0     Sig: Take 1 tablet (25 mg total) by mouth 2 (two) times a day as needed.     No orders of the defined types were placed in this encounter.      UDT/SWAB:  Patient required a random Urine Drug Testing, due to the need to comply with [place] [employer] Policy Guidelines and CDC Guideline for the use of any controlled substances. This is to ensure that patient is compliant with treatment, and monitor for risks such as diversion, abuse, or any other aberrant behaviors. Patient is either being considered for or taking a controlled substance. Unexpected findings will be discussed and treatment decision may be adjusted. Testing is being implemented across the board randomly w/o bias related to age, race, gender, socioeconomic status or [employer] affiliation.    The patient understand todays plan and has their questions answered in regards to expectations and current treatment plan.     SAFETY REMINDERS  No alcohol while taking controlled substances. Alcohol is not an illegal substance, it is unsafe to use in combination. It is a build up of substances in the body that can be extremely hazardous and may cause respirations to slow to a dangerous rate resulting in hospitalization, brain damage, or death.    Opioid medications have been associated with sharp rise in unintentional overdose and death.  Overdose is a condition characterized by the consumption in excess of a particular drug causing adverse effects. This can happen b/c you are sick, accidentally or intentionally took an extra dose, are on multiple medication that can interact. Someone took  your medication and they are not use to the medication.  Symptoms of overdose include:   !breathing slow and shallow, erratic or not at all  !pinpoint pupils, hallucinations  !confusion  !muscle jerks, slack muscles   !extreme sleepiness or loss of alertness   !awake but not able to talk   !face pale or clammy, vomiting, for lighter skinned people, the skin tone turns bluish purple, for darker skinned people, it turns grayish or ashen   If in a situation where overdose is a concern engage the emergency response system (dial 911).    In one study it was noted that 80% of unintentional overdoses occurred in people who were taking a combination of opioids and benzodiazepines.    Do not sell, loan, borrow or share your opioid medication with anyone. Deaths have occurred as a result of this practice. It is illegal and patients are being prosecuted.     Prevent unexpected access/loss of medication: Keep medication locked. Only carry what you need with you.    Millie Weaver, [facility]  [address]  [place] [address]  Ph: [phone]  Fax: [phone]

## 2021-06-18 NOTE — PROGRESS NOTES
Cardiac Rehab  Phase II Assessment    Assessment Date: 5/15/2018    Diagnosis: inferior MI  Date of Onset: 4/20/2018  Procedure: ADOLFO to RCAx2  Date of Onset: 4/20/2018  ICD/Pacemaker: No Parameters: NA  Post-op Complications: NA   ECG History: SR EF%:58  Past Medical History:   Patient Active Problem List   Diagnosis     Primary osteoarthritis involving multiple joints     Bursitis Of The Hip     Congenital anomaly of lumbar spine     IBS (irritable bowel syndrome)     GERD (gastroesophageal reflux disease)     Food impaction of esophagus     Hx of radiation therapy     Multiple sclerosis     Hyperlipidemia, unspecified hyperlipidemia type     Essential hypertension     Perforation of esophagus     Status post thoracotomy     Anemia     Fibromyalgia     Malignant neoplasm of right female breast     Deaf, left     Chronic intractable pain     Open wound of chest wall     ST elevation myocardial infarction (STEMI), unspecified artery     Coronary artery disease       Physical Assessment  Precautions/ Physical Limitations: Lt knee pain, generalized muscular pain fibromyalgia  Oxygen: No  O2 Sats: 98 Lung Sounds: clear Edema: none  Incisions: Rt groin intact. Rt mid chest scabbed, slightly reddened  Sleeping Pattern: fair   Appetite: fair   Nutrition Risk Screen: Weight loss Gets hungry but can't eat much, stomach shrunk with Esophageal surgery    Pain na  Location: na  Characteristics:na  Intensity: (0-10 scale) 0  Current Pain Management: weaning off Oxycodone with help of [facility]  Intervention: Oxycodone  Response: good    Psychosocial/ Emotional Health  1. In the past 12 months, have you been in a relationship where you have been abused physically, emotionally, sexually or financially? No  notified: NA  2. Who do you turn to for emotional support?: , best friend  3. Do you have cultural or spiritual needs? No  4. Have there been any major life changes in the past 12 months?  No    Referral Information  Primary Physician: Andrea Nino MD  Cardiologist: Dr Reynolds  Surgeon: FLAKITA    Home exercise/Equipment: walk, TM, bikes    Patient's long-term goal(s):  To regain stamina, gain weight    1. Living Accommodations: Apartment/ Condo Steps: No      Support people at home:    2. Marital Status:   3. Family is able to assist with cares      Restorationism/Community involvement:  Would like to get back to regular Jainism attendance  4. Recreation/Hobbies: camping, swimming, gardening

## 2021-06-18 NOTE — PROGRESS NOTES
ITP ASSESSMENT   Assessment Day: 30 Day  Session Number: 7 of 12  Precautions: standard cardiac, esophageal infection, s/p stoma  Diagnosis: MI;Stent  Risk Stratification: Medium  Referring Provider: Hudson Atkinson MD  EXERCISE                       Exercise Plan  Goals Next 30 days  ADL'S: Pt will prep/pack food for houseboat trip without increased fatigue or other symptoms.  Leisure: pt will attempt exercise of walking laps in pool up to waist , walking 3 laps without symptoms  Work: Pt will demonstrate improved endurance in social settings, visiting without increased fatigue.    Education Goals: All goals in this section met  Education Goals Met: Patient can state cardiac s/s and appropriate emergency response.;Has system for taking medication.;Medication review.                        Goals Met  Initial ADL's goals met: Not yet returned to driving.  Pt currently weaning dose of oxy for pain.  Plans to return to driving when done with pain meds. Goal of planting flowers met with assit of her .  Initial Leisure goals met: Goals not met due to schedule conflicts and low energy  Intial Work goals met: goals not met  Initial Progression: Pt has made progress with gaining confidence to participate in activities with improved confidence,  Pt continues to demo carryover with home exercise program.      Exercise Prescription  Exercise Mode: Treadmill;Nustep;Arm Erg.  Frequency: 2-3x's/week  Duration: 30-45 min  Intensity / THR: 20-30 beats above resting heart rate  RPE 11-14  Progression / Met level: 3-4 mets  Resistive Training?: Yes    Current Exercise (mins/week): 105    Interventions  Home Exercise:  Mode: walking  Frequency: 7x's/week  Duration: 15 min walk    Education Material : Educational videos;Provide written material;Individual education and counseling;Offer educational classes    Education Completed  Exercise Education Completed: Signs and Symptoms;Medication review;RPE;Emergency Plan;Home  Exercise;Warm up/cool down;BP/HR Reponse to exercise;End point of exercise            Exercise Follow-up/Discharge  Follow up/Discharge: pt discouraged at times due to lack of energy          NUTRITION  Nutrition Assessment: Reassessment    Nutrition Risk Factors:  Nutrition Risk Factors: Dyslipidemia  Cholesterol: 100  LDL: 47  HDL: 36  Triglycerides: 83    Nutrition Plan  Interventions  Diet Consult: Completed  Other Nutrition Intervention: Therapist/Pt Discussion  Initial Rate Your Plate Score: 57      Education Completed  Nutrition Education Completed: Low Saturated fat diet;Discussed small frequent meals and nutritional supplements    Goals  Nutrition Goals (Next 30 days): Patient will maintain current weight or gradual weight gain    Goals Met  Nutrition Goals Met: Provided Rate your Plate Survey;Patient can identify their risk factors for CAD;Patient will maintain current weight or gradual weight gain;Patient follows a low sodium diet    Height, Weight, and  BMI  Weight: 97 lb 9.6 oz (44.3 kg)  Height: 5' (1.524 m)  BMI: 19.06    Nutrition Follow-up  Follow-up/Discharge: Patient has been trying to eat small frequent meals and has added a protein supplement.  Patient has reflux issues and is keeping a food and sympton log that she will share with her physician.      Other Risk Factors  Other Risk Factor Assessment: Reassessment    HTN Risk Factor: Hypertension    Pre Exercise BP: 120/68  Post Exercise BP: 102/60    Hypertension Plan  Goals  HTN Goals: Follow low sodium diet;Take medication as prescribed;Exercises regularly    Goals Met  HTN Goals Met: Follow low sodium diet;Take medication as prescribed;Exercises regularly    HTN Interventions  HTN Interventions: Diet consult;Therapist/patient discussion;Provide written material;Offer educational videos    HTN Education Completed  HTN Education Completed: Medication review    Tobacco Risk Factor: NA        Risk Factor Follow-up   Follow-up/Discharge: managing  BP by taking meds as prescribed       PSYCHOSOCIAL  Psychosocial Assessment: Reassessment     [facility] Summary Score: 23    AUDI-D Score: 4    Psychosocial Risk Factor: Stress    Psychosocial Plan  Interventions  Interventions: Individual education and counseling    Education Completed  Education Completed: Relaxation/Coping Techniques    Goals  Goals (Next 30 days): Practicing stress management skills    Goals Met  Goals Met: Identified Support system;Identify stressors;Practicing stress management skills    Psychosocial Follow-up  Follow-up/Discharge: Pt feels she's trying to manage stress by being positive about small steps forward         Patient involved in Goal setting?: Yes    Signature: _____________________________________________________________    Date: __________________    Time: __________________

## 2021-06-19 NOTE — PROGRESS NOTES
PAIN CLINIC FOLLOW UP PROGRESS NOTE    CC:  Chief Complaint   Patient presents with     Back Pain     Knee Pain       HPI  Minerva Blanco is a 73 y.o. female who presents for evaluation   Chief Complaint   Patient presents with     Back Pain     Knee Pain    that is causing continued pain. Since the last visit the patient denies any trips to the urgent care or ED specifically for their pain. The patient denies any new medications, diagnoses since the last visit. Specific questions indicated the patient wanted addressed today include: follow up on her chronic pain, reports that her right shoulder is having pain and cannot have it replaced until she has medically stablized from her MI       Major issues:  1. Chronic right shoulder pain    2. Chronic pain syndrome    3. Chronic pain    4. Chronic intractable pain        Today the pain is located in their right shoulder and is described as constant when it is aggravated it lasts for constantly with intermittent flares, and is rated at a 6 on a scale of 1-10  Associated symptoms: Denies any loss of bladder control, fevers/chills, unintentional weight loss, weakness, numbness or pain that interferes with sleep.   Aggravating factors include: increased movement  Alleviating factors: tramadol and reduced movement  Adverse effects of medications: NONE   Functional symptoms: affects every aspect of her life, activity and socialization, ADLs  Current subjective treatment efficacy: Fair      Previous Medical History  History   Alcohol Use No     History   Drug Use No     History   Smoking Status     Former Smoker     Packs/day: 1.00     Years: 15.00     Types: Cigarettes     Quit date: 1/1/1998   Smokeless Tobacco     Never Used       Pertinent Pain Medications/interventions:  She currently takes tramadol 50 mg two times a day prn, gabapentin and tizanidine.     Review of Systems:  12 point systems were reviewed with pt as documented on pt health form and the patient denies  any new diagnosis or changes in 12 point system review since the last visit.     Physical Exam  Vitals:    07/25/18 1049   BP: (!) 86/52   Patient Site: Right Arm   Patient Position: Sitting   Cuff Size: Adult Regular   Pulse: 79   Resp: 16   Weight: (!) 96 lb (43.5 kg)   Height: 5' (1.524 m)     General- patient is alert and oriented, in NAD, well-groomed, well-nourished  Psych- Judgment and insight normal, AOx4, recent and remote memory normal, mood and affect normal  Eyes- pupils are equal and reactive, conjunctiva is clear bilaterally, no ptosis is noted.   Respiratory- breathing is non-labored  Cardiovascular- extremities warm and well perfused, no peripheral edema or varicosities.  Musculoskeletal- gait is normal, extremities with no joint swelling, erythema, or warmth.  Neuro- normal strength, no gait abnormalities, normal sensation to pain, temperature, light touch.  Integumentary- no rashes, dermatitis or discolorations noted throughout, no open wounds noted.    Medications    Current Outpatient Prescriptions:      acetaminophen (TYLENOL) 500 MG tablet, Take 500 mg by mouth every 6 (six) hours as needed for pain., Disp: , Rfl:      aspirin 81 mg chewable tablet, Chew 1 tablet (81 mg total) daily., Disp: , Rfl: 0     atorvastatin (LIPITOR) 80 MG tablet, Take 1 tablet (80 mg total) by mouth daily., Disp: 90 tablet, Rfl: 3     BENEFIBER, GUAR GUM, ORAL, Take by mouth daily. 2 teaspoonfuls in 4-8 oz liquid, Disp: , Rfl:      diphenhydrAMINE (BENADRYL) 25 mg tablet, Take 25-50 mg by mouth at bedtime as needed for sleep., Disp: , Rfl:      diphenoxylate-atropine (LOMOTIL) 2.5-0.025 mg per tablet, Take 1 tablet by mouth 2 (two) times a day as needed for diarrhea., Disp: , Rfl:      ferrous sulfate 325 (65 FE) MG tablet, Take 1 tablet by mouth daily with breakfast., Disp: , Rfl:      gabapentin (NEURONTIN) 400 MG capsule, Take 400 mg in the morning and 800 mg at bedtime, Disp: 90 capsule, Rfl: 2     Lactobacillus  rhamnosus GG (CULTURELLE) 10-15 Billion cell capsule, Take 1 capsule by mouth 2 (two) times a day. 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 , Disp: , Rfl:      loperamide (IMODIUM) 2 mg capsule, Take 2 mg by mouth 4 (four) times a day as needed for diarrhea., Disp: , Rfl:      melatonin 10 mg Tab, Take 10 mg by mouth at bedtime., Disp: , Rfl:      metoprolol succinate (TOPROL-XL) 50 MG 24 hr tablet, Take 1 tablet (50 mg total) by mouth daily., Disp: 180 tablet, Rfl: 3     multivitamin with minerals (THERA-M) 9 mg iron-400 mcg Tab tablet, Take 1 tablet by mouth daily., Disp: , Rfl:      omeprazole (PRILOSEC) 20 MG capsule, Take 20 mg by mouth 2 (two) times a day before meals., Disp: , Rfl:      ranitidine (ZANTAC) 150 MG tablet, Take 150 mg by mouth 2 (two) times a day., Disp: , Rfl:      simethicone (GAS RELIEF) 125 mg cap capsule, Take 125 mg by mouth every 6 (six) hours as needed for flatulence., Disp: , Rfl:      ticagrelor (BRILINTA) 90 mg Tab, Take 1 tablet (90 mg total) by mouth 2 (two) times a day., Disp: 180 tablet, Rfl: 3     traZODone (DESYREL) 100 MG tablet, Take 150 mg by mouth at bedtime. , Disp: , Rfl:      tiZANidine (ZANAFLEX) 4 MG tablet, Take 1-2 po at HS prn, Disp: 60 tablet, Rfl: 0     traMADol (ULTRAM) 50 mg tablet, Take 1 tablet (50 mg total) by mouth every 8 (eight) hours as needed for pain., Disp: 120 tablet, Rfl: 0    Lab:  Last UDS on 2018 had expected results. Any abnormal results have been discussed with the patient and may change the plan of care. Please see the scanned document from the outside lab under the media tab. This was reviewed with the patient.      Imaging:  No new imaging.      Recent   Dated 2018 was reviewed with the patient today.   EXO5 [place] Date: 18  Query Report Page#: 1  Patient Rx History Report  BEN BALLESTEROS  Search Criteria: Last Name 'ben' and First Name 'ashley' and  = ' and Request Period = '17'  to '  - 5 out of 5 Recipients Selected.  Fill Date Product, Str, Form Qty Days Pt ID Prescriber Written RX# N/R* Pharm **MED+  ---------- -------------------------------- ------------ ---- --------- ---------- ---------- ------------ ----- --------- ------  07/17/2018 DIPHENOXYLATE-ATROP 2.5-0.025 30.748930 15 [id number] 07/17/2018 [id number] N [id number] 00.0  07/17/2018 TRAMADOL HCL 50 MG TABLET 40.757217 14 [id number] 07/17/2018 [id number] N [id number] 14.29  07/17/2018 GABAPENTIN 400 MG CAPSULE 90.707448 30 [id number] 06/19/2018 [id number] R [id number] 00.0  07/10/2018 TRAMADOL HCL 50 MG TABLET 40.845497 5 [id number] 07/10/2018 [id number] N [id number] 40.0  07/04/2018 TRAMADOL HCL 50 MG TABLET 30.106315 10 [id number] 07/03/2018 [id number] N [id number] 15.0  06/19/2018 OXYCODONE HCL 5 MG TABLET 30.563393 10 [id number] 06/19/2018 [id number] N [id number] 22.5  06/19/2018 GABAPENTIN 400 MG CAPSULE 90.041581 30 [id number] 06/19/2018 [id number] N [id number] 00.0  06/18/2018 DIPHENOXYLATE-ATROP 2.5-0.025 60.670200 30 [id number] 06/18/2018 [id number] N [id number] 00.0  06/13/2018 TRAMADOL HCL 50 MG TABLET 30.469549 7 [id number] 06/12/2018 [id number] N [id number] 21.43  05/30/2018 OXYCODONE HCL 5 MG TABLET 107.551387 18 [id number] 05/30/2018 [id number] N [id number] 44.58  05/24/2018 DIPHENOXYLATE-ATROP 2.5-0.025 60.177750 30 [id number] 05/24/2018 [id number] N [id number] 00.0  05/18/2018 GABAPENTIN 400 MG CAPSULE 90.855962 30 [id number] 03/20/2018 [id number] R [id number] 00.0  05/11/2018 DIPHENOXYLATE-ATROP 2.5-0.025 30.986652 15 [id number] 05/11/2018 [id number] N [id number] 00.0  04/30/2018 OXYCODONE HCL 10 MG TABLET 119.236329 28 [id number] 04/30/2018 [id number] N [id number] 63.75  04/28/2018 DIPHENOXYLATE-ATROP 2.5-0.025 30.189233 15 [id number] 04/27/2018 [id number] N [id number] 00.0  04/13/2018 GABAPENTIN 400 MG CAPSULE 90.792216 30 [id number] 03/20/2018  [id number]  [id number] 00.0  04/11/2018 OXYCODONE HCL 10 MG TABLET 112.744412 19 [id number] 04/11/2018 [id number] N [id number] 88.42  03/29/2018 OXYCODONE HCL 10 MG TABLET 112.279185 14 [id number] 03/20/2018 [id number] N [id number] 120.0  03/20/2018 GABAPENTIN 400 MG CAPSULE 90.418710 30 [id number] 03/20/2018 [id number] N [id number] UNK  03/06/2018 OXYCODONE HCL 10 MG TABLET 180.364695 30 [id number] 03/06/2018 [id number] N [id number] 90.0  02/24/2018 OXYCODONE HCL 10 MG TABLET 60.640158 10 [id number] 02/19/2018 [id number] N [id number] 90.0  02/15/2018 GABAPENTIN 400 MG CAPSULE 60.318333 30 [id number] 02/15/2018 [id number] N [id number] K  02/14/2018 OXYCODONE HCL 10 MG TABLET 60.662612 10 [id number] 02/14/2018 [id number] N [id number] 90.0  02/12/2018 OXYCODONE HCL 5 MG/5 ML SOLN 30.856401 1 [id number] 02/12/2018 2009240 N [id number] 45.0  01/28/2018 OXYCODONE HCL 10 MG TABLET 120.854806 30 [id number] 01/25/2018 [id number] N [id number] 60.0  01/25/2018 GABAPENTIN 400 MG CAPSULE 60.313736 30 [id number] 01/25/2018 [id number] N [id number] K  01/10/2018 OXYCODONE HCL 5 MG TABLET 180.391228 30 [id number] 01/04/2018 [id number] N [id number] 45.0  01/10/2018 BUPRENORPHINE 10 MCG/HR PATCH 4.048987 28 [id number] 01/04/2018 [id number] N [id number] UNK  01/07/2018 OXYCODONE HCL 5 MG TABLET 6.329068 1 [id number] 01/07/2018 [id number] N [id number] 45.0  12/28/2017 TRAMADOL HCL 50 MG TABLET 40.839972 10 [id number] 12/28/2017 [id number] N [id number] 20.0  12/19/2017 TRAMADOL HCL 50 MG TABLET 40.778838 5 [id number] 12/19/2017 [id number] N [id number] 40.0  12/13/2017 OXYCODONE HCL 5 MG TABLET 180.428130 30 [id number] 12/13/2017 [id number] N [id number] 45.0  12/13/2017 BUTRANS 10 MCG/HR PATCH 4.509624 28 [id number] 12/13/2017 [id number] N [id number] UNK  11/29/2017 TRAMADOL HCL 50 MG TABLET 30.395974 15 [id number] 11/29/2017 [id number] N [id number]  10.0  11/21/2017 HYDROCODONE-ACETAMIN 5-325 MG 30.630851 8 [id number] 11/21/2017 [id number] N [id number] 18.75  11/15/2017 OXYCODONE HCL 5 MG TABLET 180.915702 30 [id number] 11/15/2017 [id number] N [id number] 45.0  11/15/2017 BUTRANS 10 MCG/HR PATCH 4.000836 28 [id number] 11/15/2017 [id number] N [id number] UNK  11/15/2017 TRAMADOL HCL 50 MG TABLET 30.460161 4 [id number] 11/15/2017 [id number] N [id number] 37.5  **Per CDC guidance, the conversion factors and associated daily morphine milligram equivalents for drugs prescribed as part of  medication-assisted treatment for opioid use disorder should not be used to benchmark against dosage thresholds meant for opioids  prescribed for pain.  Report Disclaimers:  The report provided above is based upon the search criteria and the data provided by the dispensing entities. For more information  about any prescription, please contact the dispenser or the prescriber.  This report contains confidential information, including patient identifiers, and is not a public record. The information on this  report must be treated as protected health information and is to be disclosed to others only as authorized by applicable state  and Federal regulations.    Assessment:   Minerva Blanco is a 73 y.o. female seen in clinic today for   Chief Complaint   Patient presents with     Back Pain    Right shoulder pain      Patient presents to the clinic today to follow up on her ongoing right shoulder pain, patient states that her surgeon would like to wait until she is medically stable due to the recent MI. The patient reports that she is getting tramadol for now until this can be done and has had injections that have not provided her with enough relief. This patient is well established here at the pain clinic and has not had any aberrant behaviors. A refill of the tramadol was provided to the patient today as well as tizanidine. Patient would also benefit from KT tape for  joint support which can be picked up at any pharmacy, if the patient feels the need to have a PT referral placed to help demonstrate techniques for her in regards to her shoulder pain she knows to notify the clinic.     Patients current MME is 10  Patient to call clinic for next month's prescription 5 days in advance. Based on the patients response to their previous narcotic therapy and quality of life, which includes their participation in ADLs, I recommend that the narcotics therapy continue, however the changes listed below will be incorporated into their plan of care.     Patient set goals to   1. To control the pain in relation to her right shoulder     Plan:   Interventions-    Follow up in 8 weeks to evaluate the effectiveness of the treatment interventions ordered today.     Therapy- PT/OT- call if you need an order for this.     Recommend that you use KT tape for right shoulder support.     Prescription Drug Management will be continued by the [facility] [place] [facility]  A narcotic contract was signed by the patient and  Patient is unable to get narcotics from other providers. They will be subject to random UAs.      Orders placed today  Medications that were ordered today   Requested Prescriptions     Signed Prescriptions Disp Refills     traMADol (ULTRAM) 50 mg tablet 120 tablet 0     Sig: Take 1 tablet (50 mg total) by mouth every 8 (eight) hours as needed for pain.     tiZANidine (ZANAFLEX) 4 MG tablet 60 tablet 0     Sig: Take 1-2 po at HS prn     No orders of the defined types were placed in this encounter.      UDT/SWAB:  Patient required a random Urine Drug Testing, due to the need to comply with [place] [employer] Policy Guidelines and CDC Guideline for the use of any controlled substances. This is to ensure that patient is compliant with treatment, and monitor for risks such as diversion, abuse, or any other aberrant behaviors. Patient is either being considered for or taking a controlled substance.  Unexpected findings will be discussed and treatment decision may be adjusted. Testing is being implemented across the board randomly w/o bias related to age, race, gender, socioeconomic status or Gnosticism affiliation.    The patient understand todays plan and has their questions answered in regards to expectations and current treatment plan.     SAFETY REMINDERS  No alcohol while taking controlled substances. Alcohol is not an illegal substance, it is unsafe to use in combination. It is a build up of substances in the body that can be extremely hazardous and may cause respirations to slow to a dangerous rate resulting in hospitalization, brain damage, or death.    Opioid medications have been associated with sharp rise in unintentional overdose and death.  Overdose is a condition characterized by the consumption in excess of a particular drug causing adverse effects. This can happen b/c you are sick, accidentally or intentionally took an extra dose, are on multiple medication that can interact. Someone took your medication and they are not use to the medication.  Symptoms of overdose include:   !breathing slow and shallow, erratic or not at all  !pinpoint pupils, hallucinations  !confusion  !muscle jerks, slack muscles   !extreme sleepiness or loss of alertness   !awake but not able to talk   !face pale or clammy, vomiting, for lighter skinned people, the skin tone turns bluish purple, for darker skinned people, it turns grayish or ashen   If in a situation where overdose is a concern engage the emergency response system (dial 911).    In one study it was noted that 80% of unintentional overdoses occurred in people who were taking a combination of opioids and benzodiazepines.    Do not sell, loan, borrow or share your opioid medication with anyone. Deaths have occurred as a result of this practice. It is illegal and patients are being prosecuted.     Prevent unexpected access/loss of medication: Keep medication  locked. Only carry what you need with you.    Millie Weaver, formerly [facility]  [address]  [place] [address]  Ph: [phone]  Fax: [phone]

## 2021-06-19 NOTE — PROGRESS NOTES
Patient presents to the clinic today for a follow up with Millie Weaver CNP regarding back and knee pain.

## 2021-06-20 NOTE — LETTER
Letter by Leonela Regan CNP at      Author: Leonela Regan CNP Service: -- Author Type: --    Filed:  Encounter Date: 9/17/2020 Status: (Other)         Patient: Minerva Blanco   MR Number: [id number]   YOB: 1945   Date of Visit: 9/17/2020     Code Status:  FULL CODE  Visit Type: Review Of Multiple Medical Conditions (failure to thrive, malnutrition, hypokalemia, hypophosphotemia, hypocalcemia, falls at home weakness, Alcohol disorder, Hepatic steatosis, GERD, Ecoli UTI, macrocytic anemia, MS, and aspiration pneumonia and acute hypoxia. )     Facility:  [facility] [[id number]]      Facility Type: SNF (Skilled Nursing Facility, TCU)    History of Present Illness:   Hospital Admission Date: 9/9/2020 Hospital Discharge Date: 9/14/2020      Minerva Blanco is a 75 y.o. female who has a past medical history for CAD s/p stent, fibromyalgia, multiple sclerosis, multiple esophageal procedures, GERD, breast CA (s/p right masectomy), Liver disease, chronic lymphedema.  She was recently admitted to [facility] for frequent falls at home with abnormal labs, weakness, malnutrition and FTT.  She had 2 admissions in Aug 2020 for the same issues.     FTT, Malnutrition and Weakness:   -Presumed due to poor po intake and daily Alcohol use.  -She reported to [facility] that drinks 1-2 glasses of Wine every night.    -Recommended f/u with neurology in 2 weeks for possible EMG for weakness.     Hepatic steatosis with transminitis and possible hepatitis vs cirrhosis, abdominal ascites, elevated INR, LFTs, decreased fibrinogen and low albumin.   -PIPPA 0.025  -declined to speak with addiction medicine  -recommend f/u US to look for cirrhosis    Esophageal reflux, partial gastrectomy and multiple esophageal surgeries  -regurgitation  -F/u with GI   -refer to psych for depression that contributes to decreased appetite.     Asymptomatic E.Coli bacteruria and suspected aspiration pneumonia  -found to have lung opacities with  dyspnea on exertion   - found to have Ecoli in the urine.  She was started on Cefuroxime thru 9/21/2020.     Macrocytic anemia:   -likely due to alcohol use  -elevated B12 3,829 but thought to be due to liver disease  -recommend f/u SPEP/FLC    Hypokalemia:   -likely due to diarrhea s/p IBS-D and poor po intake  -given immodium    Hypomagnesemia: resolved  Hypophosphatemia and hypocalcemia  Calcium trending up 7.3 to 8. Thought to be due to low magnesemia and decreased PTH secretion.  She was asymptomatic. Put on phos and KCL replacement.     LYUBOV-resolved.     Peripheral lymphedema:   Venous stasis vs low albumin.  Holding lasix    CAD:   - Decreased amplitude in R wave in V4 concerning for old MI  - F/u with cardiology.     Also, recommended f/u proteinuria    Today, I follow up with her after nursing called yesterday stating she became hypoxic and required NC O2 to maintain sats greater than 90%.  She is on 2L and satting 91%. LS are diminished in the bases and she has shallow breathing without accessory muscle use.  We are awaiting COVID-19 testing.     She tells me that she does have constant burping and regurgitation due to no esophageal sphincter after surgeries.  She also reports that she was drinking 1 glass of wine.  She does not recall any recommendations of avoiding alcohol.  I counseled her in avoiding alcohol due to Liver disease and reflux. She reports that she was told to take Omeprazole three times a day with meals and then prn x 1 daily for bad episodes at night.  She sees a GI provider at [facility].     Doxepin was started in June and is requesting that this be changed to a different med as it causes her to be too sleepy.  She was on cymbalta in the past but does not recall why this was changed.  She endorses a low mood and feelings of hopelessness.     She has peripheral 4+ pitting edema of BLE and soft nonpitting edema of BUE.  She states she has had issues with this since her back  surgeries. She is typically on lasix which is being held at this time.     She endorses watery diarrhea, (has chronic diarrhea with IBS) but states it has worsened since starting antibiotics.       Past Medical History:   Diagnosis Date   ? Acute inferolateral myocardial infarction (H) 4/20/2018   ? LYUBOV (acute kidney injury) (H)    ? Anemia    ? Arthritis    ? Aspiration pneumonia (H)    ? Breast cancer (H) 2007    right lumpectomy hx of breast cancer   ? Cough, persistent    ? Deaf, left    ? Esophagus perforation 01/22/2016   ? Fibromyalgia    ? GERD (gastroesophageal reflux disease)    ? Hepatic steatosis    ? Hiatal hernia    ? History of transfusion    ? HTN (hypertension) 5/2/2015   ? Hx antineoplastic chemotherapy 2007    hx of right lumpectomy   ? Hx of radiation therapy 2007    right breast lumpectomy   ? Hyperlipidemia    ? Hypocalcemia    ? Hypokalemia    ? Hypomagnesemia    ? Hypophosphatemia    ? IBS (irritable bowel syndrome)    ? Insomnia    ? Low back pain radiating to left leg     with tingling   ? Meniere disease    ? Multiple sclerosis (H)    ? Neuropathy    ? 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 Paroxysmal atrial fibrillation (H)    ? Sinus tachycardia      Past Surgical History:   Procedure Laterality Date   ? APPENDECTOMY     ? BACK SURGERY     ? BREAST LUMPECTOMY Right 2007    hx of right lumpectomy   ? CATARACT EXTRACTION Right    ? CV CORONARY ANGIOGRAM N/A 4/20/2018    Procedure: Coronary Angiogram;  Surgeon: Hudson Atkinson MD;  [facility];  Service:    ? CV LEFT HEART CATHETERIZATION WITH LEFT VENTRICULOGRAM N/A 4/20/2018    Procedure: Left Heart Catheterization with Left Ventriculogram;  Surgeon: Hudson Atkinson MD;  Location: [facility];  Service:    ? ESOPHAGECTOMY  01/09/2017    distal, with spit fistula creation   ? ESOPHAGOGASTRODUODENOSCOPY N/A 5/3/2015    Procedure: ESOPHAGOGASTRODUODENOSCOPY;  Surgeon: Rocky Kendall MD;  Location: [facility];  Service:    ? EYE SURGERY     ? FRACTURE SURGERY     ? HERNIA REPAIR     ? PARTIAL GASTRECTOMY  2017    Proximal   ? partial resection distal clavicle, debridement, decompression      right shoulder   ? PELVIC LAPAROSCOPY      for endometriosis   ? AR LAP,ESOPHAGOGAST FUNDOPLASTY N/A 2016    Procedure: LAPAROSCOPIC HIATAL HERNIA REPAIR-TOUPE PROCEDURE.;  Surgeon: Shon Carroll MD;  Location: [facility];  Service: General   ? SPINE SURGERY     ? THORACOTOMY Right     for empyema   ? WRIST SURGERY Left      Family History   Problem Relation Age of Onset   ? Alzheimer's disease Mother    ? Other Father         cerebral hemorrhage    ? Ovarian cancer Sister 69   ? Breast cancer Daughter 50   ? No Medical Problems Brother    ? No Medical Problems Brother    ? No Medical Problems Daughter    ? BRCA 1/2 Neg Hx    ? Cancer Neg Hx    ? Colon cancer Neg Hx    ? Endometrial cancer Neg Hx      Social History     Socioeconomic History   ? Marital status:      Spouse name: Not on file   ? Number of children: Not on file   ? Years of education: Not on file   ? Highest education level: Not on file   Occupational History   ? Occupation:      Comment: Retired   Social Needs   ? Financial resource strain: Not on file   ? Food insecurity     Worry: Not on file     Inability: Not on file   ? Transportation needs     Medical: Not on file     Non-medical: Not on file   Tobacco Use   ? Smoking status: Former Smoker     Packs/day: 1.00     Years: 15.00     Pack years: 15.00     Types: Cigarettes     Last attempt to quit: 1998     Years since quittin.7   ? Smokeless tobacco: Never Used   Substance and Sexual Activity   ? Alcohol use: No   ? Drug use: No   ? Sexual activity: Not on file   Lifestyle   ? Physical activity     Days per week: Not on file     Minutes per session: Not on file   ? Stress: Not on file   Relationships   ? Social connections     Talks on phone: Not on  file     Gets together: Not on file     Attends Islam service: Not on file     Active member of club or organization: Not on file     Attends meetings of clubs or organizations: Not on file     Relationship status: Not on file   ? Intimate partner violence     Fear of current or ex partner: Not on file     Emotionally abused: Not on file     Physically abused: Not on file     Forced sexual activity: Not on file   Other Topics Concern   ? Not on file   Social History Narrative     (Richard)  2 children  Single level home  1/2017       Current Outpatient Medications   Medication Sig Dispense Refill   ? acetaminophen (TYLENOL) 325 MG tablet Take 650 mg by mouth 2 (two) times a day as needed for pain (at least 4 hours apart).     ? albuterol (PROAIR HFA;PROVENTIL HFA;VENTOLIN HFA) 90 mcg/actuation inhaler Inhale 2 puffs every 4 (four) hours as needed for shortness of breath.     ? gabapentin (NEURONTIN) 100 MG capsule Take 100 mg by mouth at bedtime.     ? Lactobacillus acidophilus 100 mg (1 billion cell) cap Take 1 capsule by mouth 2 (two) times a day.     ? melatonin 3 mg Tab tablet Take 3 mg by mouth at bedtime as needed.     ? metoprolol succinate (TOPROL-XL) 25 MG Take 1 tablet (25 mg total) by mouth daily. (Patient taking differently: Take 12.5 mg by mouth daily. )  0   ? polyvinyl alcohol (LIQUIFILM TEARS) 1.4 % ophthalmic solution Administer 2 drops to both eyes every 4 (four) hours as needed for dry eyes.     ? potassium chloride (KLOR-CON) 20 mEq packet Take 20 mEq by mouth 2 (two) times a day.     ? potassium phos-sodium phos (K-PHOS NEUTRAL) 250 mg Tab tablet Take 1 tablet by mouth daily.     ? thiamine 100 MG tablet Take 100 mg by mouth daily.     ? tiZANidine (ZANAFLEX) 4 MG tablet Take 4 mg by mouth at bedtime.     ? venlafaxine (EFFEXOR-XR) 37.5 MG 24 hr capsule Take 37.5 mg by mouth daily.     ? omeprazole (PRILOSEC) 20 MG capsule Take 20 mg by mouth 3 (three) times a day. May take an  additional dose x 1 daily for reflux symptoms     ? traZODone (DESYREL) 100 MG tablet Take 100 mg by mouth at bedtime.              No current facility-administered medications for this visit.      Allergies   Allergen Reactions   ? Fentanyl      Other reaction(s): Edema         ? Amoxicillin Nausea Only     diarrhea   ? Ativan [Lorazepam] Other (See Comments)     Hallucinations   ? Morphine Itching   ? Other Environmental Allergy      seasonal     Immunization History   Administered Date(s) Administered   ? Influenza, inj, historic,unspecified 11/17/2015       Post Discharge Medication Reconciliation Status: discharge medications reconciled and changed, per note/orders    Review of Systems   Patient denies fever, chills, headache, lightheadedness, dizziness, chest pain, palpitations, abdominal pain, n/v,  constipation, change in appetite, dysuria, frequency, burning or pain with urination.  Other than stated in HPI all other review of systems is negative.         Physical Exam   Vital signs: /74, HR 1010, resp 16, temp 98.2  GENERAL APPEARANCE: Thin, cachetic, frail elderly woman in no acute distress  HEENT: normocephalic, atraumatic  sclerae anicteric, conjunctivae clear and moist, EOM intact, dry oral mucosa. Esophageal convulsion due to reflux  LUNGS: diminished in the bases,  no adventitious sounds, respiratory effort normal.  CARD: RRR, S1, S2, without murmurs, gallops, rubs  ABD: Soft, nondistended and nontender with normal bowel sounds.   EXTREMITIES: 4+ pitting edema to BLE and soft nonpitting to BUE  NEURO: Alert and oriented x 3.  Face is symmetric.  SKIN: dry skin   PSYCH: flat            Labs:    Recent Results (from the past 240 hour(s))   COVID-19 VIRUS (CORONAVIRUS) BY PCR - EXTERNAL RESULT    Specimen: Nasopharyngeal Swab    Specimen type and source: Nasopharyngeal Swab, Nasopharyngeal Swab   Result Value Ref Range    COVID-19 Virus by PCR (External Result) Not Detected Not Detected    Comprehensive Metabolic Panel   Result Value Ref Range    Sodium 141 136 - 145 mmol/L    Potassium 3.5 3.5 - 5.0 mmol/L    Chloride 104 98 - 107 mmol/L    CO2 27 22 - 31 mmol/L    Anion Gap, Calculation 10 5 - 18 mmol/L    Glucose 53 (LL) 70 - 125 mg/dL    BUN 6 (L) 8 - 28 mg/dL    Creatinine 0.48 (L) 0.60 - 1.10 mg/dL    GFR MDRD Af Amer >60 >60 mL/min/1.73m2    GFR MDRD Non Af Amer >60 >60 mL/min/1.73m2    Bilirubin, Total 2.1 (H) 0.0 - 1.0 mg/dL    Calcium 6.8 (L) 8.5 - 10.5 mg/dL    Protein, Total 4.3 (L) 6.0 - 8.0 g/dL    Albumin 1.5 (L) 3.5 - 5.0 g/dL    Alkaline Phosphatase 249 (H) 45 - 120 U/L    AST 59 (H) 0 - 40 U/L    ALT 28 0 - 45 U/L   Prealbumin   Result Value Ref Range    Prealbumin 5.1 (L) 19.0 - 38.0 mg/dL   INR   Result Value Ref Range    INR 1.33 (H) 0.90 - 1.10   HM1 (CBC with Diff)   Result Value Ref Range    WBC 3.4 (L) 4.0 - 11.0 thou/uL    RBC 2.34 (L) 3.80 - 5.40 mill/uL    Hemoglobin 9.2 (L) 12.0 - 16.0 g/dL    Hematocrit 28.1 (L) 35.0 - 47.0 %     (H) 80 - 100 fL    MCH 39.3 (H) 27.0 - 34.0 pg    MCHC 32.7 32.0 - 36.0 g/dL    RDW 15.8 (H) 11.0 - 14.5 %    Platelets 179 140 - 440 thou/uL    MPV 11.6 8.5 - 12.5 fL   Manual Differential   Result Value Ref Range    Total Neutrophils % 56 50 - 70 %    Lymphocytes % 24 20 - 40 %    Monocytes % 11 (H) 2 - 10 %    Eosinophils %  9 (H) 0 - 6 %    Basophils % 1 0 - 2 %    Immature Granulocyte % - Manual 0 <=0 %    Total Neutrophils Absolute 1.9 (L) 2.0 - 7.7 thou/ul    Lymphocytes Absolute 0.8 0.8 - 4.4 thou/uL    Monocytes Absolute 0.4 0.0 - 0.9 thou/uL    Eosinophils Absolute 0.3 0.0 - 0.4 thou/uL    Basophils Absolute 0.0 0.0 - 0.2 thou/uL    Immature Granulocyte Absolute - Manual 0.0 <=0.0 thou/uL    Platelet Estimate Normal Normal    Polychromasia 1+ (!) Negative    Target Cells 3+ (!) Negative         Assessment:  1. Pneumonia of left lower lobe due to infectious organism     2. Severe malnutrition (H)     3. Failure to thrive in  adult     4. Weakness     5. Multiple sclerosis (H)     6. Lymphedema     7. Hepatic steatosis     8. Depression, unspecified depression type     9. Coronary artery disease involving native heart without angina pectoris, unspecified vessel or lesion type     10. Asymptomatic bacteriuria         Plan:   Pneumonia: chest xray today showed infiltrate in LLL.  Will add Flagyl to Ceftin already ordered.  ST to assess swallowing.      Severe malnutrition: Boost Breeze three times a day, Dietician to follow and make recommendations. Continue on Kphos and KCL.  Monitor labs. Albumin 1.5 and prealbumin 5.1    FTT: may be difficult to impact due to extent of her illness.  Will address nutrition and depression.  Would recommend psychiatry follow up.     Weakness: ? If related to MS however neurology didn't think she was flaring.  Will work with therapies and have her follow up with neurology in 2 weeks.     MS: no medications, follow up with neurology.     Lymphedema: likely related to low albumin.  Will not restart Lasix at this time due to electrolytes issues.  MESHA hose to LE daily.    Hepatic steatosis: recommend follow up with GI for Liver speciality. LFTs and INR are improved from hospitalization.     Depression: large component to her health.  She can't tell me why she was put on Doxepin.  Will discontinue and start Effexor XR at low dose due to Liver issues.     CAD: no chest pain, no ASA or statin likely due to liver issues. Follow up with cardiology.     Bacteriuria: continues to be asymptomatic. Continue with ceftin.     Macrocytic anemia: hgb improving.  Continue with folic acid and thiamine.  Nutrition is important here.     40 total minutes with 30 minutes spent with patient in counseling regarding alcohol use in her current state. Medication review and teaching. Counseling regarding hospitalization events.       Electronically signed by: Leonela Regan, AMMY

## 2021-06-20 NOTE — LETTER
Letter by Phil Hopper MD at      Author: Phli Hopper MD Service: -- Author Type: --    Filed:  Encounter Date: 9/18/2020 Status: (Other)         Patient: Minerva Blanco   MR Number: [id number]   YOB: 1945   Date of Visit: 9/18/2020      Medical Care for Seniors/ Geriatrics    Facility:  [facility] [[id number]]    Code Status:  FULL CODE    Chief Complaint   Patient presents with   ? H & P   :                    Patient Active Problem List   Diagnosis   ? Primary osteoarthritis involving multiple joints   ? Bursitis Of The Hip   ? Congenital anomaly of lumbar spine   ? IBS (irritable bowel syndrome)   ? GERD (gastroesophageal reflux disease)   ? Food impaction of esophagus   ? Hx of radiation therapy   ? Multiple sclerosis (H)   ? Hyperlipidemia, unspecified hyperlipidemia type   ? Essential hypertension   ? Perforation of esophagus   ? Status post thoracotomy   ? Anemia   ? Fibromyalgia   ? Malignant neoplasm of right female breast (H)   ? Deaf, left   ? ST elevation myocardial infarction (STEMI), unspecified artery (H)   ? Coronary artery disease   ? CHF (congestive heart failure), NYHA class I, acute on chronic, combined (H)   ? Bilateral lower extremity edema   ? Moderate protein-calorie malnutrition (H)   ? Gross hematuria   ? Anemia due to blood loss, acute   ? Delayed surgical wound healing, initial encounter   ? Anasarca       History:  Minerva Blanco  is a 75 year old female with complex past medical history including but not limited to:  - Severe GERD leading to Nissen fundoplication 2016 complicated by postoperative infection with prolonged hospital stays at the [facility] under the care of Dr. Rivera for antibiotic therapy, I&D operations which she says lasted a few months.  It was eventually recommended that she undergo surgical division of her esophagus from stomach with partial resection of each.  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  She has ongoing severe reflux but has been back eating for some time now.  Patient is on high-dose PPI 3-4 times daily  - Varying levels of moderate to severe protein malnutrition.  She has been hospitalized 3 times in the last 2 months.  Tube feeding has been recommended but she is unwilling to consider it based on her history of understanding it so well from the 3-year she spent with it.  Patient has had intermittent and recent severe hypoalbuminemia and electrolyte deficiencies and hypoglycemia history  -Varying levels of dysphasia/esophageal dysmotility over the years.    Meanwhile she has had a myriad of other health problems including coronary artery disease/inferior lateral MI with stenting and April 2018, chronic diastolic heart failure, MS which has been quiescent, peripheral neuropathy, alcohol use of concern to medical professionals, varying degrees of edema often severe, chronic cholelithiasis and chronic bile duct dilation with negative MRCP 2019 recent GI follow-up in June 2020 recommending no further GI work-up for the bile duct dilation, complicated lumbar fractures including two-stage vertebroplasty procedure with associated radiculopathy and foot drop after which she spent months in the TCU in [facility], lumbosacral disc disease, urinary retention, breast cancer with right lumpectomy and lymph node dissection, left ear deafness, hepatic steatosis, hyperlipidemia, Ménière's disease, fibromyalgia hypertension, blood transfusions, paroxysmal atrial fibrillation (not on anticoagulation)       seen for admission to TCU on 9/18/2020    Hospital Course: Patient has 3 recent hospitalizations:  1.  [facility] August 1 through August 15 for failure to  thrive weakness falls.  She had negative TTE negative head CT, electrolyte replacement.  Feeding tube was recommended and again refused.  2.  Patient was hospitalized [facility] August 17 through August 21 for failure to thrive.  She had extensive work-up at this time with no malignancy found.  She had negative CT chest abdomen pelvis negative TTE, negative ceruloplasmin for Elian's disease, normal B12 and folate levels negative peripheral smear.  Her doxepin was discontinued at that time tizanidine was discontinued she had new methocarbamol new mirtazapine at that time.  She did not stick with those medication changes.  3.  Most recently hospitalized September 9 through September 14 at [facility] for failure to thrive, hypotension, frequent falls.  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  She was seen by neurology who did not feel this represented a flare of multiple sclerosis.  Malnutrition was felt to be another major problem contributing to the symptoms.  She was seen by PT, OT social service and TCU was recommended  - Blood alcohol level 0.025 acetone 11.  Elevated LFTs, ascites concerning for possible cirrhosis although cirrhosis has not been officially diagnosed.  She was noted to have coagulopathy but was not felt to have DIC.  -Severe reflux was not further evaluated but treated with Prilosec 20 milligrams 4 times daily 1/2-hour before meals and at bedtime  - She was diagnosed with aspiration pneumonia based on abnormal chest x-ray with upper lobe infiltrates and new hypoxemia.  She was treated with cefuroxime and weaned off oxygen by discharge with recommendation for follow-up chest x-ray in about 6 weeks  -Patient had E. coli in urine of uncertain significance but covered by cefuroxime  - Macrocytosis noted with SPEP checked and pending FLC  pending.  Her B12 was elevated with this admission though it has been recently normal at the [facility].  Thus it is not likely a marker for malignancy in this case.  She was also noted to have elevated CPK of uncertain significance.  -Severe electrolyte disturbance with need for replacement of potassium phosphorus and magnesium  -Acute kidney injury with improved by discharge  -Depression       TCU course: Patient seen by  yesterday who was concerned about her new oxygen need.  About 3 days ago at the TCU she was hypoxic and quite dyspneic when up to the bathroom.  Repeat chest x-ray is abnormal with increased density in the left lower base area most likely infiltrate.  Bilateral effusions likely as well.  Flagyl was added to her cefuroxime in case of anaerobic infection associated with possible aspiration for which she is at high risk, though she denies any knowledge of aspiration event.  -Patient also with loose stools C. difficile PCR pending though she says she often gets loose stool with antibiotics so she is not surprised by it.    Subjective/ROS:    -augmented by discussion with facility staff involved in direct care  -limited by: Overwhelming amount of material to review and multiple acute issues requiring attention    I spent 65 minutes face-to-face with patient today with extensive review of her past medical history and how it relates to her current problems as well as acute concerns which have developed in the TCU, as well as anticipatory guidance and prognostication and discussion about future care goals.    Patient says she feels lousy but she is very glad to be out of the hospital.  She says that she has had increasing shortness of breath since she got here though she had shortness of breath prior to leaving the hospital as well.  She says today it might be a little bit better.  She really notes that when she tries to go to the bathroom and get back to bed where she will have  "\"chest heaving\" for 20 minutes after returning to bed.  She feels extremely weak and washed out.  She has been edematous throughout her hospital stay but says that that is worse especially in the right arm which is now weeping.  She notes that she has extensive bruising in the left arm required in her recent hospital stay.  She notes that her lower legs are edematous but she says they have been worse and have not been weeping.  She has poor appetite and has chronic nausea with lots of reflux burping acid brash none of which is new.  She has not been vomiting.  She has not had melena or bright red blood per rectum.  Her stools are loose but not watery.  She is not sure if she is depressed or not but knows that she is worn out and is considering whether it is worth going on with acute medical cares at this point.  She says her children are \"trying to force me into assisted living, but I will not go \"patient drinks 1 to 2 glasses of wine.  Patient admits to multiple falls with worsening weakness over period of weeks to months.  She simply says she never feels well.  She has significant peripheral neuropathy especially in the feet and lower legs.  Her swelling is always worse in the right arm than the left arm she assumes because of her  lymph node dissection.  She denies cough fever sweats or chills PND.  Positive orthopnea, she likes the head of the bed elevated because she feels it is easier to breathe.  Denies dysuria or new skin rash or new adenopathy.  Left ear deafness remainder negative    Past Medical History:   Diagnosis Date   ? Acute inferolateral myocardial infarction (H) 4/20/2018   ? LYUBOV (acute kidney injury) (H)    ? Anemia    ? Arthritis    ? Aspiration pneumonia (H)    ? Breast cancer (H) 2007    right lumpectomy hx of breast cancer   ? Cough, persistent    ? Deaf, left    ? Esophagus perforation 01/22/2016   ? Fibromyalgia    ? GERD (gastroesophageal reflux disease)    ? Hepatic steatosis    ? Hiatal " hernia    ? History of transfusion    ? HTN (hypertension) 5/2/2015   ? Hx antineoplastic chemotherapy 2007    hx of right lumpectomy   ? Hx of radiation therapy 2007    right breast lumpectomy   ? Hyperlipidemia    ? Hypocalcemia    ? Hypokalemia    ? Hypomagnesemia    ? Hypophosphatemia    ? IBS (irritable bowel syndrome)    ? Insomnia    ? Low back pain radiating to left leg     with tingling   ? Meniere disease    ? Multiple sclerosis (H)    ? Neuropathy    ? 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 Paroxysmal atrial fibrillation (H)    ? Sinus tachycardia      Past Surgical History:   Procedure Laterality Date   ? APPENDECTOMY     ? BACK SURGERY     ? BREAST LUMPECTOMY Right 2007    hx of right lumpectomy   ? CATARACT EXTRACTION Right    ? CV CORONARY ANGIOGRAM N/A 4/20/2018    Procedure: Coronary Angiogram;  Surgeon: Hudson Atkinson MD;  Location: [facility];  Service:    ? CV LEFT HEART CATHETERIZATION WITH LEFT VENTRICULOGRAM N/A 4/20/2018    Procedure: Left Heart Catheterization with Left Ventriculogram;  Surgeon: Hudson Atkinson MD;  Location: [facility];  Service:    ? ESOPHAGECTOMY  01/09/2017    distal, with spit fistula creation   ? ESOPHAGOGASTRODUODENOSCOPY N/A 5/3/2015    Procedure: ESOPHAGOGASTRODUODENOSCOPY;  Surgeon: Rocky Kendall MD;  Location: [facility];  Service:    ? EYE SURGERY     ? FRACTURE SURGERY     ? HERNIA REPAIR     ? PARTIAL GASTRECTOMY  01/09/2017    Proximal   ? partial resection distal clavicle, debridement, decompression      right shoulder   ? PELVIC LAPAROSCOPY      for endometriosis   ? CT LAP,ESOPHAGOGAST FUNDOPLASTY N/A 1/7/2016    Procedure: LAPAROSCOPIC HIATAL HERNIA REPAIR-TOUPE PROCEDURE.;  Surgeon: Shon Carroll MD;  Location: [facility] OR;  Service: General   ? SPINE SURGERY     ? THORACOTOMY Right     for empyema   ? WRIST SURGERY Left           Family History   Problem Relation Age of Onset   ? Alzheimer's disease Mother    ?  Other Father         cerebral hemorrhage    ? Ovarian cancer Sister 69   ? Breast cancer Daughter 50   ? No Medical Problems Brother    ? No Medical Problems Brother    ? No Medical Problems Daughter    ? BRCA 1/2 Neg Hx    ? Cancer Neg Hx    ? Colon cancer Neg Hx    ? Endometrial cancer Neg Hx    :       Social History     Socioeconomic History   ? Marital status:      Spouse name: Not on file   ? Number of children: Not on file   ? Years of education: Not on file   ? Highest education level: Not on file   Occupational History   ? Occupation:      Comment: Retired   Social Needs   ? Financial resource strain: Not on file   ? Food insecurity     Worry: Not on file     Inability: Not on file   ? Transportation needs     Medical: Not on file     Non-medical: Not on file   Tobacco Use   ? Smoking status: Former Smoker     Packs/day: 1.00     Years: 15.00     Pack years: 15.00     Types: Cigarettes     Last attempt to quit: 1998     Years since quittin.7   ? Smokeless tobacco: Never Used   Substance and Sexual Activity   ? Alcohol use: No   ? Drug use: No   ? Sexual activity: Not on file   Lifestyle   ? Physical activity     Days per week: Not on file     Minutes per session: Not on file   ? Stress: Not on file   Relationships   ? Social connections     Talks on phone: Not on file     Gets together: Not on file     Attends Zoroastrian service: Not on file     Active member of club or organization: Not on file     Attends meetings of clubs or organizations: Not on file     Relationship status: Not on file   ? Intimate partner violence     Fear of current or ex partner: Not on file     Emotionally abused: Not on file     Physically abused: Not on file     Forced sexual activity: Not on file   Other Topics Concern   ? Not on file   Social History Narrative     (Richard)  2 children  Single level home  2017   :    Has been living independently over the past few months though she had  been in TCU and it sounds like maybe an EVETTE for some time before that.    Current Outpatient Medications on File Prior to Visit   Medication Sig Dispense Refill   ? acetaminophen (TYLENOL) 325 MG tablet Take 650 mg by mouth 2 (two) times a day as needed for pain (at least 4 hours apart).     ? albuterol (PROAIR HFA;PROVENTIL HFA;VENTOLIN HFA) 90 mcg/actuation inhaler Inhale 2 puffs every 4 (four) hours as needed for shortness of breath.     ? gabapentin (NEURONTIN) 100 MG capsule Take 100 mg by mouth at bedtime.     ? Lactobacillus acidophilus 100 mg (1 billion cell) cap Take 1 capsule by mouth 2 (two) times a day.     ? melatonin 3 mg Tab tablet Take 3 mg by mouth at bedtime as needed.     ? metoprolol succinate (TOPROL-XL) 25 MG Take 1 tablet (25 mg total) by mouth daily. (Patient taking differently: Take 12.5 mg by mouth daily. )  0   ? omeprazole (PRILOSEC) 20 MG capsule Take 20 mg by mouth 3 (three) times a day. May take an additional dose x 1 daily for reflux symptoms     ? polyvinyl alcohol (LIQUIFILM TEARS) 1.4 % ophthalmic solution Administer 2 drops to both eyes every 4 (four) hours as needed for dry eyes.     ? potassium chloride (KLOR-CON) 20 mEq packet Take 20 mEq by mouth 2 (two) times a day.     ? potassium phos-sodium phos (K-PHOS NEUTRAL) 250 mg Tab tablet Take 1 tablet by mouth daily.     ? thiamine 100 MG tablet Take 100 mg by mouth daily.     ? tiZANidine (ZANAFLEX) 4 MG tablet Take 4 mg by mouth at bedtime.     ? traZODone (DESYREL) 100 MG tablet Take 100 mg by mouth at bedtime.            ? venlafaxine (EFFEXOR-XR) 37.5 MG 24 hr capsule Take 37.5 mg by mouth daily.       No current facility-administered medications on file prior to visit.    :      ALLERGIES:  Fentanyl; Amoxicillin; Ativan [lorazepam]; Morphine; and Other environmental allergy    Vitals:    Current Vitals   BP: 116/85 mmHg  9/18/2020 20:07  Temp:98.6  F  9/18/2020 20:07  Pulse: 84 bpm  9/18/2020 20:07  Weight: 101.4  Lbs  9/15/2020 13:59  Resp: 16 Breaths/min  9/18/2020 20:07  BS:  O2: 94 %  9/18/2020 20:07  Pain: 1  9/18/2020 20:0  Physical exam:    Patient is alert she is oriented x3 and she is in no distress in bed with her oxygen on.  She is pale and appears frail.  She has difficulty moving around the bed under her own power for examination.  She has very little if any subcutaneous fat, looks cachectic  Normocephalic/atraumatic gaze is conjugate sclera clear oropharynx is moist demonstrates good range of motion of her neck with rotation of her head she can raise her arms above her head.  Her lung exam is abnormal with decreased/absent breath sounds in the base about a fourth of the way up on the right there is also lesser absent breath sounds in the left base.  Above these areas of absent breath sounds she has rales which are minimal but persistent.  There is no wheezing accessory muscle use tachypnea or increased work of breathing.  Heart is regular S1-S2, soft systolic murmur.  She has extensive bruising in the region of the left elbow.  She has anasarca with 4+ edema in the right arm 3+ in the left arm 3+ in her legs, she pits throughout the legs including the thigh region she has a small amount of presacral edema as well.  She has extensive surgical scars including fistula tract in the right mid chest, about 12 small scars in the belly lumbar sacral scars however skin is intact in visualized areas.  Lower extremities show evidence of venous stasis dermatitis in the pretibial areas with dry flaky skin and some patchy hyperpigmentation.  Patient is symmetrically weak but has antigravity strength throughout.  She has difficulty sitting forward on her own.    Due to the 2020 Covid 19 pandemic, except as noted above, the patient was visually observed at a 6 foot plus distance.  Full PPE was worn  Labs:  Lab Results   Component Value Date    WBC 3.4 (L) 09/17/2020    HGB 9.2 (L) 09/17/2020    HCT 28.1 (L) 09/17/2020    MCV  120 (H) 09/17/2020     09/17/2020     Results for orders placed or performed during the hospital encounter of 04/20/18   Basic metabolic panel   Result Value Ref Range    Sodium 138 136 - 145 mmol/L    Potassium 4.5 3.5 - 5.0 mmol/L    Chloride 110 (H) 98 - 107 mmol/L    CO2 19 (L) 22 - 31 mmol/L    Anion Gap, Calculation 9 5 - 18 mmol/L    Glucose 83 70 - 125 mg/dL    Calcium 7.9 (L) 8.5 - 10.5 mg/dL    BUN 7 (L) 8 - 28 mg/dL    Creatinine 0.52 (L) 0.60 - 1.10 mg/dL    GFR MDRD Af Amer >60 >60 mL/min/1.73m2    GFR MDRD Non Af Amer >60 >60 mL/min/1.73m2         Lab Results   Component Value Date    TSH 1.40 09/20/2019     Lab Results   Component Value Date    HGBA1C 4.5 06/06/2019     [unfilled]  Lab Results   Component Value Date    ETNSGHDL76 326 09/20/2019     Lab Results   Component Value Date    BNP 1,192 (H) 06/07/2019     [unfilled]  Most Recent EKG     Units 06/05/19  1150   VENTRATE BPM 77   ATRIALRATE BPM 77   QRSDURATION ms 72   QTINTERVAL ms 410   QTCCALC ms 463   P [place] degrees 77   RAXIS degrees 32   TAXIS degrees 36   MUSEDX  Normal sinus rhythm  Low voltage QRS  Nonspecific T wave abnormality  Abnormal ECG  When compared with ECG of 21-APR-2018 06:56,  Nonspecific T wave abnormality has replaced inverted T waves in Inferior leads  Nonspecific T wave abnormality has replaced inverted T waves in Anterior leads  Confirmed by JIA ZAPATA MD LOC:[facility] ([id number]) on 6/5/2019 4:52:03 PM           Assessment/Plan:      ICD-10-CM    1. Anasarca  R60.1        Acute hypoxic respiratory failure  Abnormal chest x-ray  Ongoing aspiration pneumonia treatment with cefuroxime  High risk for aspiration   I see this is patient's primary acute problem today though there are many issues we discussed.  She was treated for aspiration pneumonia during her recent hospitalization and remains on cefuroxime.  She did require some oxygen at the time of her diagnosis in the hospital but had been off of it for days until it  was restarted here due to hypoxia.  She reports great increase in her dyspnea on exertion over the last 3 days, perhaps somewhat better today.  CNP started Flagyl yesterday in case she has further aspiration to cover anaerobes along with the cefuroxime.  She does have an ampicillin allergy though it sounds like it is GI distress and she might be able to tolerate Augmentin?  She is not been febrile coughing.    I discussed the differential with her which includes acute PE (although patient has had elevated INR and her recent hospital stay), worsening pneumonia/new aspiration pneumonia, worsening pleural effusions especially with significant abnormality of the right base lung exam/CHF.    I recommend hospital evaluation now for diagnostic purposes.  I explained that we cannot know how to treat her appropriately here without knowing the diagnosis for her respiratory failure..  She politely and repeatedly declines to consider hospitalization.  I explained the serious nature of this problem especially if she is not getting appropriate treatment such as if PE is present.  She states an understanding of the risk to her life and declines hospitalization.  She says she will continue to contemplate it and let us know if she changes her mind.      Thus we also discussed hospice comfort care approach.  She says she has been giving this thought herself but feels that her children would not support that decision so she has not talked about it much with them.  She has been so sick for so long with so many days in the hospital, worsening rather than improving despite those many hospital days that she has correctly surmised that her prognosis is quite guarded, especially as she refuses to consider enteral feedings.    - She will allow Doppler studies here in the hospital which I will do of both legs and the right arm.  If negative we could at least start lymphedema wraps and treatment.  However negative studies do not help eliminate  the possibility of pulmonary embolism of course.  She states an understanding of that.  - We will restart Lasix despite her recent electrolyte issues at 20 mg daily hold for systolic blood pressure less than 100, though again I am not certain that her acute respiratory failure is due to CHF/increasing pleural effusion  -Continue current antibiotic therapy, though I explained that if this is due to pneumonia she is worsening despite being on cefuroxime which is a great concern to me and again I would recommend hospital evaluation, CT scanning of the chest etc.  -I encouraged her to discuss my recommendations with her family to see if they could lend some support but she reminds me she is her own decision maker and does not wish to involve them  -Assuming patient somehow stabilizes and does not require additional imaging, follow-up chest x-ray in 4 to 6 weeks recommended to assure resolution of all infiltrates  -COVID-19 has already been sent and is pending    Anasarca  Severe malnutrition  Severe hypoalbuminemia  Hypo-glycemia  History of enteral feeds/PEG tube for 3 years    very difficult situation to treat her anasarca.  Her albumin is 1.6.  She is not eating and has poor appetite.  She is chronically malnourished.  She has had severe electrolyte abnormalities with Lasix which was discontinued as a result.  It is unclear whether she has cirrhosis though it is a concern.  - We will restart Lasix with hold parameters  - Recheck extensive blood work on Monday, and less she has agreed to go to the hospital by that time  - Venous Dopplers of both lower extremities and the right arm and if negative for clot lymphedema therapist can begin lymphedema wraps with which she is well familiar with months of treatment in the past  -Dietitian consult/supplements    Elevated LFTs  Coagulopathy with elevated INR and abnormal fibrinogen  Hepatic steatosis  ?  Cirrhosis?   Patient needs GI consultation for this and other reasons.   This is not immediately available outside of hospital setting of course.  - Recheck LFTs on Monday, INR has well.    Swallowing dysfunction  Multiple esophageal surgeries  Partial gastrectomy/partial esophagectomy  Severe reflux   Patient is currently on high-dose PPI 4 times daily which I did not change but should have GI oversight and management.  Elevate head of bed consistently.  SLP consultation.  Patient at high risk for aspiration    Chronic low back pain   Patient has been on a variety of muscle relaxers is back on tizanidine which she had taken for a long time though she was briefly on Robaxin after 1 of her hospital stays.  I did not make changes today she does have Tylenol as well.  With worsening liver function we would have to moderate Tylenol dosing further.    Electrolyte disturbances   Recently corrected in the hospital but at high risk especially with resumption of Lasix.  Recheck electrolytes on Monday    Depression   Patient has been on doxepin more consistently than other medications.  However during her second [facility] hospitalization noted above it was discontinued in favor of mirtazapine.  However somewhere between then and now she is back on doxepin for reasons that she cannot explain nor can I find in the documentation.  DH, CNP discontinue the doxepin yesterday because of sedation and shows low-dose Effexor.  I would generally favor mirtazapine due to her nutritional status, but it sounds like she recently tried it.  She is now longer on it which makes me wonder if there might be some sort of problem with it.  She cannot recall.  I cannot find it in the extensive documentation.  I am concerned about psychoactive medication choices with her severe comorbidities and recommended psychiatric consultation in which she is not currently interested or willing to do.    Elevated CPK   Noted in the hospital for reasons that are unclear.  Question medication related?  Follow-up CPK on  Monday    Coagulopathy   Suspected liver disease along with severe malnutrition.  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  Neurology did see her and recommends EMG in a few weeks if not steadily improving with rehabilitation.  She has severe generalized weakness with multiple falls and is likely to need months of rehabilitation and strengthening to regain adequate functioning for independent living which is her goal.  She does have a follow-up appointment with neurology in 2 weeks.  She does not state her refusal for that so I will leave it intact as ordered.    Alcohol use   There is some debate as to how much this is contributing to her overall medical condition.  It is clear she should be a nondrinker which she does not dispute.  She has declined chemical dependency evaluation.  Recommend full abstinence.    Care goals   Patient wishes she could return to independent living.  She says that she never wants to go back to congregate living situation despite what her children want.  She also realizes that she has continued deteriorate for the last couple of months despite intensive inpatient medical attention, multiple specialty consultations, etc.  Thus we discussed comfort care/hospice type approach but this would not alleviate the housing concern/independent hope that she has.  No final decisions are made today.  I encouraged her to discuss this with her family    Elevated B12   Hospital physician was worried this might be associated with malignancy however it was normal just a few weeks earlier at prior hospital stay.  May need to recheck it in a few weeks    Loose stool   C. difficile PCR is pending      Case discussed with:  Primary CNP   Facility staff       Time: 65 minutes face-to-face, plus another 15  minutes discussion with facility staff and CMP with > 50% in counseling and coordination of care as noted above.  Extensive record review of 60+ minutes so far and ongoing?        Phil Hopper MD

## 2021-06-20 NOTE — PROGRESS NOTES
PAIN CLINIC FOLLOW UP PROGRESS NOTE    CC:  Chief Complaint   Patient presents with     Back Pain     Knee Pain       HPI  Minerva Blanco is a 73 y.o. female who presents for evaluation   Chief Complaint   Patient presents with     Back Pain     Knee Pain    that is causing continued pain. Since the last visit the patient denies any trips to the urgent care or ED specifically for their pain. The patient denies any new medications, diagnoses since the last visit. Specific questions indicated the patient wanted addressed today include: To follow up on her chronic pain that is in her right shoulder and is unable to get surgery until next April due to her heart.     Major issues:  1. Chronic pain syndrome    2. Chronic right shoulder pain    3. Chronic intractable pain      Today the pain is located in their right shoulder and is described as constant when it is aggravated it lasts for constantly with intermittent flares, and is rated at a 6 on a scale of 1-10 mostly in her right shoulder at this time     Associated symptoms: Denies any loss of bladder control, fevers/chills, unintentional weight loss, weakness, numbness or pain that interferes with sleep.   Aggravating factors include: increased movement  Alleviating factors: tramadol and reduced movement  Adverse effects of medications: NONE   Functional symptoms: affects every aspect of her life, activity and socialization, ADLs  Current subjective treatment efficacy: Fair      Previous Medical History  History   Alcohol Use No     History   Drug Use No     History   Smoking Status     Former Smoker     Packs/day: 1.00     Years: 15.00     Types: Cigarettes     Quit date: 1/1/1998   Smokeless Tobacco     Never Used       Pertinent Pain Medications/interventions:  She currently takes tramadol 3-4 tabs per day as well as gabapentin 400 mg at night.   Tizanidine as needed    Review of Systems:  12 point systems were reviewed with pt as documented on pt health form  and the patient denies any new diagnosis or changes in 12 point system review since the last visit. No changes since last visit.     Physical Exam  Vitals:    09/28/18 1123   BP: 114/70   Patient Site: Right Arm   Patient Position: Sitting   Cuff Size: Adult Regular   Pulse: 73   Resp: 14   Weight: (!) 91 lb (41.3 kg)   Height: 5' (1.524 m)     General- patient is alert and oriented, in NAD, well-groomed, well-nourished  Psych- Judgment and insight normal, AOx4, recent and remote memory normal, mood and affect normal  Eyes- pupils are equal and reactive, conjunctiva is clear bilaterally, no ptosis is noted.   Respiratory- breathing is non-labored  Cardiovascular- extremities warm and well perfused, no peripheral edema or varicosities.  Musculoskeletal- gait is normal, extremities with no joint swelling, erythema, or warmth. Right shoulder pain is noted with ROM   Neuro- normal strength, no gait abnormalities, normal sensation to pain, temperature, light touch.  Integumentary- no rashes, dermatitis or discolorations noted throughout, no open wounds noted.    Medications    Current Outpatient Prescriptions:      acetaminophen (TYLENOL) 500 MG tablet, Take 500 mg by mouth every 6 (six) hours as needed for pain., Disp: , Rfl:      aspirin 81 mg chewable tablet, Chew 1 tablet (81 mg total) daily., Disp: , Rfl: 0     atorvastatin (LIPITOR) 80 MG tablet, Take 1 tablet (80 mg total) by mouth daily., Disp: 90 tablet, Rfl: 3     BENEFIBER, GUAR GUM, ORAL, Take by mouth daily. 2 teaspoonfuls in 4-8 oz liquid, Disp: , Rfl:      diphenhydrAMINE (BENADRYL) 25 mg tablet, Take 25-50 mg by mouth at bedtime as needed for sleep., Disp: , Rfl:      diphenoxylate-atropine (LOMOTIL) 2.5-0.025 mg per tablet, Take 1 tablet by mouth 2 (two) times a day as needed for diarrhea., Disp: , Rfl:      ferrous sulfate 325 (65 FE) MG tablet, Take 1 tablet by mouth daily with breakfast., Disp: , Rfl:      Lactobacillus rhamnosus GG (CULTURELLE) 10-15  Billion cell capsule, Take 1 capsule by mouth 2 (two) times a day. 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 , Disp: , Rfl:      loperamide (IMODIUM) 2 mg capsule, Take 2 mg by mouth 4 (four) times a day as needed for diarrhea., Disp: , Rfl:      melatonin 10 mg Tab, Take 10 mg by mouth at bedtime., Disp: , Rfl:      metoprolol succinate (TOPROL-XL) 50 MG 24 hr tablet, Take 1 tablet (50 mg total) by mouth daily., Disp: 180 tablet, Rfl: 3     multivitamin with minerals (THERA-M) 9 mg iron-400 mcg Tab tablet, Take 1 tablet by mouth daily., Disp: , Rfl:      omeprazole (PRILOSEC) 20 MG capsule, Take 20 mg by mouth 2 (two) times a day before meals., Disp: , Rfl:      ranitidine (ZANTAC) 150 MG tablet, Take 150 mg by mouth 2 (two) times a day., Disp: , Rfl:      simethicone (GAS RELIEF) 125 mg cap capsule, Take 125 mg by mouth every 6 (six) hours as needed for flatulence., Disp: , Rfl:      ticagrelor (BRILINTA) 90 mg Tab, Take 1 tablet (90 mg total) by mouth 2 (two) times a day., Disp: 180 tablet, Rfl: 3     TiZANidine (ZANAFLEX) 4 MG capsule, 1-2 at bedtime, Disp: 60 capsule, Rfl: 0     traZODone (DESYREL) 100 MG tablet, Take 150 mg by mouth at bedtime. , Disp: , Rfl:      gabapentin (NEURONTIN) 100 MG capsule, Take 300 mg by mouth at bedtime for 12 days. For 4 days, then 200 mg for 4 days then 100 mg for 4 days then stop., Disp: 24 capsule, Rfl: 0     traMADol (ULTRAM) 50 mg tablet, Take 1 tablet (50 mg total) by mouth 4 (four) times a day as needed for pain., Disp: 120 tablet, Rfl: 0    Lab:  Last UDS on 1/25/2018 had expected results. Any abnormal results have been discussed with the patient and may change the plan of care. Please see the scanned document from the outside lab under the media tab. This was reviewed with the patient.      Imaging:  No new imaging.      Recent   Dated 9/28/2018 was reviewed with the patient today.   [place] [place] Date: 09/27/18  Query Report Page#: 1  Patient Rx History  Report  BEN [facility]ERY  Search Criteria: Last Name 'ben' and First Name 'richardson' and  = ' and Request Period = ' to  ' - 5 out of 5 Recipients Selected.  Fill Date Product, Str, Form Qty Days Pt ID Prescriber Written RX# N/R* Pharm **MED+  ---------- -------------------------------- ------------ ---- --------- ---------- ---------- ------------ ----- --------- ------  2018 GABAPENTIN 400 MG CAPSULE 90.927157 30 [id number] 2018 [id number] N [id number] 00.0  2018 TRAMADOL HCL 50 MG TABLET 120.206054 60 [id number] 2018 [id number] N [id number] 10.0  2018 DIPHENOXYLATE-ATROP 2.5-0.025 30.451929 15 [id number] 2018 [id number] N [id number] 00.0  2018 TRAMADOL HCL 50 MG TABLET 120.494314 60 [id number] 2018 [id number] N [id number] 10.0  2018 DIPHENOXYLATE-ATROP 2.5-0.025 30.816231 15 [id number] 2018 [id number] N [id number] 00.0  2018 TRAMADOL HCL 50 MG TABLET 40.262516 14 [id number] 2018 [id number] N [id number] 14.29  2018 GABAPENTIN 400 MG CAPSULE 90.462727 30 [id number] 2018 [id number] R [id number] 00.0  07/10/2018 TRAMADOL HCL 50 MG TABLET 40.573732 5 [id number] 07/10/2018 [id number] N [id number] 40.0  2018 TRAMADOL HCL 50 MG TABLET 30.801000 10 [id number] 2018 [id number] N [id number] 15.0  2018 OXYCODONE HCL 5 MG TABLET 30.932718 10 [id number] 2018 [id number] N [id number] 22.5  2018 GABAPENTIN 400 MG CAPSULE 90.263807 30 [id number] 2018 [id number] N [id number] 00.0  2018 DIPHENOXYLATE-ATROP 2.5-0.025 60.194444 30 [id number] 2018 [id number] N [id number] 00.0  2018 TRAMADOL HCL 50 MG TABLET 30.057289 7 [id number] 2018 [id number] N [id number] 21.43  2018 OXYCODONE HCL 5 MG TABLET 107.231477 18 [id number] 2018 [id number] N [id number] 44.58  2018 DIPHENOXYLATE-ATROP 2.5-0.025  60.782620 30 [id number] 05/24/2018 [id number] N [id number] 00.0  05/18/2018 GABAPENTIN 400 MG CAPSULE 90.278808 30 [id number] 03/20/2018 [id number] R [id number] 00.0  05/11/2018 DIPHENOXYLATE-ATROP 2.5-0.025 30.791292 15 [id number] 05/11/2018 [id number] N [id number] 00.0  04/30/2018 OXYCODONE HCL 10 MG TABLET 119.335842 28 [id number] 04/30/2018 [id number] N [id number] 63.75  04/28/2018 DIPHENOXYLATE-ATROP 2.5-0.025 30.380522 15 [id number] 04/27/2018 [id number] N [id number] 00.0  04/13/2018 GABAPENTIN 400 MG CAPSULE 90.498199 30 [id number] 03/20/2018 [id number] R [id number] 00.0  04/11/2018 OXYCODONE HCL 10 MG TABLET 112.908657 19 [id number] 04/11/2018 [id number] N [id number] 88.42  03/29/2018 OXYCODONE HCL 10 MG TABLET 112.834887 14 [id number] 03/20/2018 [id number] N [id number] 120.0  03/20/2018 GABAPENTIN 400 MG CAPSULE 90.383117 30 [id number] 03/20/2018 [id number] N [id number] UNK  03/06/2018 OXYCODONE HCL 10 MG TABLET 180.910877 30 [id number] 03/06/2018 [id number] N [id number] 90.0  02/24/2018 OXYCODONE HCL 10 MG TABLET 60.375503 10 [id number] 02/19/2018 [id number] N [id number] 90.0  02/15/2018 GABAPENTIN 400 MG CAPSULE 60.433041 30 [id number] 02/15/2018 [id number] N [id number] UNK  02/14/2018 OXYCODONE HCL 10 MG TABLET 60.339315 10 [id number] 02/14/2018 [id number] N [id number] 90.0  02/12/2018 OXYCODONE HCL 5 MG/5 ML SOLN 30.225802 1 [id number] 02/12/2018 [id number] N [id number] 45.0  01/28/2018 OXYCODONE HCL 10 MG TABLET 120.358505 30 [id number] 01/25/2018 [id number] N [id number] 60.0  01/25/2018 GABAPENTIN 400 MG CAPSULE 60.902721 30 [id number] 01/25/2018 [id number] N [id number] [facility]  01/10/2018 OXYCODONE HCL 5 MG TABLET 180.287623 30 [id number] 01/04/2018 [id number] N [id number] 45.0  01/10/2018 BUPRENORPHINE 10 MCG/HR PATCH 4.938124 28 [id number] 01/04/2018 [id number] N [id number] [facility]  01/07/2018 OXYCODONE HCL 5 MG TABLET 6.030400 1  [id number] 01/07/2018 [id number] N [id number] 45.0  12/28/2017 TRAMADOL HCL 50 MG TABLET 40.710544 10 [id number] 12/28/2017 [id number] N [id number] 20.0  12/19/2017 TRAMADOL HCL 50 MG TABLET 40.462814 5 [id number] 12/19/2017 [id number] N [id number] 40.0  12/13/2017 OXYCODONE HCL 5 MG TABLET 180.270237 30 [id number] 12/13/2017 [id number] N [id number] 45.0  12/13/2017 BUTRANS 10 MCG/HR PATCH 4.167470 28 [id number] 12/13/2017 [id number] N [id number] UNK  11/29/2017 TRAMADOL HCL 50 MG TABLET 30.881603 15 [id number] 11/29/2017 [id number] N [id number] 10.0  **Per CDC guidance, the conversion factors and associated daily morphine milligram equivalents for drugs prescribed as part of  medication-assisted treatment for opioid use disorder should not be used to benchmark against dosage thresholds meant for opioids  prescribed for pain.  Report Disclaimers:  The report provided above is based upon the search criteria and the data provided by the dispensing entities. For more information  about any prescription, please contact the dispenser or the prescriber.  This report contains confidential information, including patient identifiers, and is not a public record. The information on this  report must be treated as protected health information and is to be disclosed to others only as authorized by applicable state  and Federal regulations.    Assessment:   Minerva Blanco is a 73 y.o. female seen in clinic today for   Chief Complaint   Patient presents with     Back Pain     Knee Pain     Patient follows up the pain Center today to follow-up on her chronic pain treatment plan.  Recently she tapered off of most of the opioids except for some tramadol which she can do needed just for her low back pain that has been ongoing.  Patient's thoracic open wound has resolved over time however the patient unfortunately had a heart attack this past April.  Patient is following along with cardiology and is  currently taking the pain anticoagulation.  Patient does need to have right shoulder surgery however at this time her cardiologist is recommending she wait at least one year since her last MI prior to undergoing surgery.  Patient is concerned about the use of her right arm as well as the pain.  Patient is trying to avoid opioids but feels that right now with her acute/chronic right shoulder pain she is to wait another 7 months for surgery and feels that utilizing the opioids is her best plan.    Today I will send in a refill for tramadol this is an increase she was previously taking 2-50 mg tablets per day and now is up to 4-50 mg tablets per day    Patient recently lost her spouse suddenly and unexpectedly, she reports that she is doing ok and has a support system of her daughters around her.     Patient would also like to taper down off the gabapentin because she is unsure if it is helping her at this time is currently taking 4 mg at bedtime weaning taper listed below the appropriate prescription has been sent to her pharmacy  Patients current MME is 20  Patient to call clinic for next month's prescription 5 days in advance. Based on the patients response to their previous narcotic therapy and quality of life, which includes their participation in ADLs, I recommend that the narcotics therapy continue, however the changes listed below will be incorporated into their plan of care.     Patient set goals to   1. To reduce her right shoulder pain until she can have surgery next April.     Plan:   Interventions-    Follow up in 8 weeks to evaluate the effectiveness of the treatment interventions ordered today.     Will refill your tramadol today ok to take up to 4 per day.     Ok to decrease the use of the gabapentin to 300 mg at bedtime for 4 days then 200 mg for 4 days then 100 mg for 4 days, then stop. This is a trial to see if you still need it.     Ok to get KT tape or kinesiologies tape found in the pharmacy area  for joint support.     Prescription Drug Management will be continued by the [facility]  A narcotic contract was signed by the patient and  Patient is unable to get narcotics from other providers. They will be subject to random UAs.      Orders placed today  Medications that were ordered today   Requested Prescriptions     Signed Prescriptions Disp Refills     traMADol (ULTRAM) 50 mg tablet 120 tablet 0     Sig: Take 1 tablet (50 mg total) by mouth 4 (four) times a day as needed for pain.     gabapentin (NEURONTIN) 100 MG capsule 24 capsule 0     Sig: Take 300 mg by mouth at bedtime for 12 days. For 4 days, then 200 mg for 4 days then 100 mg for 4 days then stop.     No orders of the defined types were placed in this encounter.      UDT/SWAB:  Patient required a random Urine Drug Testing, due to the need to comply with [place] [employer] Policy Guidelines and CDC Guideline for the use of any controlled substances. This is to ensure that patient is compliant with treatment, and monitor for risks such as diversion, abuse, or any other aberrant behaviors. Patient is either being considered for or taking a controlled substance. Unexpected findings will be discussed and treatment decision may be adjusted. Testing is being implemented across the board randomly w/o bias related to age, race, gender, socioeconomic status or Hindu affiliation.    The patient understand todays plan and has their questions answered in regards to expectations and current treatment plan.     SAFETY REMINDERS  No alcohol while taking controlled substances. Alcohol is not an illegal substance, it is unsafe to use in combination. It is a build up of substances in the body that can be extremely hazardous and may cause respirations to slow to a dangerous rate resulting in hospitalization, brain damage, or death.    Opioid medications have been associated with sharp rise in unintentional overdose and death.  Overdose is a condition  characterized by the consumption in excess of a particular drug causing adverse effects. This can happen b/c you are sick, accidentally or intentionally took an extra dose, are on multiple medication that can interact. Someone took your medication and they are not use to the medication.  Symptoms of overdose include:   !breathing slow and shallow, erratic or not at all  !pinpoint pupils, hallucinations  !confusion  !muscle jerks, slack muscles   !extreme sleepiness or loss of alertness   !awake but not able to talk   !face pale or clammy, vomiting, for lighter skinned people, the skin tone turns bluish purple, for darker skinned people, it turns grayish or ashen   If in a situation where overdose is a concern engage the emergency response system (dial 911).    In one study it was noted that 80% of unintentional overdoses occurred in people who were taking a combination of opioids and benzodiazepines.    Do not sell, loan, borrow or share your opioid medication with anyone. Deaths have occurred as a result of this practice. It is illegal and patients are being prosecuted.     Prevent unexpected access/loss of medication: Keep medication locked. Only carry what you need with you.    Millie Weaver, [facility]  [address]  [place] [address]  Ph: [phone]  Fax: [phone]

## 2021-06-20 NOTE — LETTER
Letter by Leonela Regan CNP at      Author: Leonela Regan CNP Service: -- Author Type: --    Filed:  Encounter Date: 9/21/2020 Status: (Other)         Patient: Minerva Blanco   MR Number: [id number]   YOB: 1945   Date of Visit: 9/21/2020     Code Status:  FULL CODE  Visit Type: Follow Up (respiratory failure, poor appetite. )     Facility:  [facility] [[id number]]      Facility Type: SNF (Skilled Nursing Facility, TCU)    History of Present Illness:   Hospital Admission Date: 9/9/2020 Hospital Discharge Date: 9/14/2020      Minerva Blanco is a 75 y.o. female who has a past medical history for CAD s/p stent, fibromyalgia, multiple sclerosis, multiple esophageal procedures, GERD, breast CA (s/p right masectomy), Liver disease, chronic lymphedema.  [facility] for frequent falls at home with abnormal labs, weakness, malnutrition and FTT.  She had 2 admissions in Aug 2020 for the same issues.     FTT, Malnutrition and Weakness:   -Presumed due to poor po intake and daily Alcohol use.  -She reported to [facility] that drinks 1-2 glasses of Wine every night.    -Recommended f/u with neurology in 2 weeks for possible EMG for weakness.     Hepatic steatosis with transminitis and possible hepatitis vs cirrhosis, abdominal ascites, elevated INR, LFTs, decreased fibrinogen and low albumin.   -PIPPA 0.025  -declined to speak with addiction medicine  -recommend f/u US to look for cirrhosis    Esophageal reflux, partial gastrectomy and multiple esophageal surgeries  -regurgitation  -F/u with GI   -refer to psych for depression that contributes to decreased appetite.     Asymptomatic E.Coli bacteruria and suspected aspiration pneumonia  -found to have lung opacities with dyspnea on exertion   - found to have Ecoli in the urine.  She was started on Cefuroxime thru 9/21/2020.     Macrocytic anemia:   -likely due to alcohol use  -elevated B12 3,829 but thought to be due to liver  disease  -recommend f/u SPEP/FLC    Hypokalemia:   -likely due to diarrhea s/p IBS-D and poor po intake  -given immodium    Hypomagnesemia: resolved  Hypophosphatemia and hypocalcemia  Calcium trending up 7.3 to 8. Thought to be due to low magnesemia and decreased PTH secretion.  She was asymptomatic. Put on phos and KCL replacement.     LYUBOV-resolved.     Peripheral lymphedema:   Venous stasis vs low albumin.  Holding lasix    CAD:   - Decreased amplitude in R wave in V4 concerning for old MI  - F/u with cardiology.     Also, recommended f/u proteinuria    Today, nursing is concerned for ongoing decline.  Upon exam, she is sitting up in her WC and appearing very pale in color.  She reports her breathing is about the same. Respiratory rate is elevated at 28 and she appears labored with extensive conversation.  O2 sats 86% on 2L.  Extremity US were negative for DVTs.  COVID test is negative.  Lung bases are diminished and now she has coarse crackles of upper right lung.  She does have peripheral discoloration of feet of purpling color.  Feet and toes are warm to palpation.  She continues to not eat much and declines the option of feeding tube.  Continues to have lymphedema to all extremites.  Labs drawn today see below.       Past Medical History:   Diagnosis Date   ? Acute inferolateral myocardial infarction (H) 4/20/2018   ? LYUBOV (acute kidney injury) (H)    ? Anemia    ? Arthritis    ? Aspiration pneumonia (H)    ? Breast cancer (H) 2007    right lumpectomy hx of breast cancer   ? Cough, persistent    ? Deaf, left    ? Esophagus perforation 01/22/2016   ? Fibromyalgia    ? GERD (gastroesophageal reflux disease)    ? Hepatic steatosis    ? Hiatal hernia    ? History of transfusion    ? HTN (hypertension) 5/2/2015   ? Hx antineoplastic chemotherapy 2007    hx of right lumpectomy   ? Hx of radiation therapy 2007    right breast lumpectomy   ? Hyperlipidemia    ? Hypocalcemia    ? Hypokalemia    ? Hypomagnesemia    ?  Hypophosphatemia    ? IBS (irritable bowel syndrome)    ? Insomnia    ? Low back pain radiating to left leg     with tingling   ? Meniere disease    ? Multiple sclerosis (H)    ? Neuropathy    ? 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 Paroxysmal atrial fibrillation (H)    ? Sinus tachycardia      Past Surgical History:   Procedure Laterality Date   ? APPENDECTOMY     ? BACK SURGERY     ? BREAST LUMPECTOMY Right 2007    hx of right lumpectomy   ? CATARACT EXTRACTION Right    ? CV CORONARY ANGIOGRAM N/A 4/20/2018    Procedure: Coronary Angiogram;  Surgeon: Hudson Atkinson MD;  Location: [facility];  Service:    ? CV LEFT HEART CATHETERIZATION WITH LEFT VENTRICULOGRAM N/A 4/20/2018    Procedure: Left Heart Catheterization with Left Ventriculogram;  Surgeon: Hudson Atkinson MD;  Location: [facility];  Service:    ? ESOPHAGECTOMY  01/09/2017    distal, with spit fistula creation   ? ESOPHAGOGASTRODUODENOSCOPY N/A 5/3/2015    Procedure: ESOPHAGOGASTRODUODENOSCOPY;  Surgeon: Rocky Kendall MD;  Location: [facility] GI;  Service:    ? EYE SURGERY     ? FRACTURE SURGERY     ? HERNIA REPAIR     ? PARTIAL GASTRECTOMY  01/09/2017    Proximal   ? partial resection distal clavicle, debridement, decompression      right shoulder   ? PELVIC LAPAROSCOPY      for endometriosis   ? NC LAP,ESOPHAGOGAST FUNDOPLASTY N/A 1/7/2016    Procedure: LAPAROSCOPIC HIATAL HERNIA REPAIR-TOUPE PROCEDURE.;  Surgeon: Shon Carroll MD;  Location: [facility];  Service: General   ? SPINE SURGERY     ? THORACOTOMY Right     for empyema   ? WRIST SURGERY Left      Family History   Problem Relation Age of Onset   ? Alzheimer's disease Mother    ? Other Father         cerebral hemorrhage    ? Ovarian cancer Sister 69   ? Breast cancer Daughter 50   ? No Medical Problems Brother    ? No Medical Problems Brother    ? No Medical Problems Daughter    ? BRCA 1/2 Neg Hx    ? Cancer Neg Hx    ? Colon cancer Neg Hx    ?  Endometrial cancer Neg Hx      Social History     Socioeconomic History   ? Marital status:      Spouse name: Not on file   ? Number of children: Not on file   ? Years of education: Not on file   ? Highest education level: Not on file   Occupational History   ? Occupation:      Comment: Retired   Social Needs   ? Financial resource strain: Not on file   ? Food insecurity     Worry: Not on file     Inability: Not on file   ? Transportation needs     Medical: Not on file     Non-medical: Not on file   Tobacco Use   ? Smoking status: Former Smoker     Packs/day: 1.00     Years: 15.00     Pack years: 15.00     Types: Cigarettes     Last attempt to quit: 1998     Years since quittin.7   ? Smokeless tobacco: Never Used   Substance and Sexual Activity   ? Alcohol use: No   ? Drug use: No   ? Sexual activity: Not on file   Lifestyle   ? Physical activity     Days per week: Not on file     Minutes per session: Not on file   ? Stress: Not on file   Relationships   ? Social connections     Talks on phone: Not on file     Gets together: Not on file     Attends [employer] service: Not on file     Active member of club or organization: Not on file     Attends meetings of clubs or organizations: Not on file     Relationship status: Not on file   ? Intimate partner violence     Fear of current or ex partner: Not on file     Emotionally abused: Not on file     Physically abused: Not on file     Forced sexual activity: Not on file   Other Topics Concern   ? Not on file   Social History Narrative     (Richard)  2 children  Single level home  2017       Current Outpatient Medications   Medication Sig Dispense Refill   ? acetaminophen (TYLENOL) 325 MG tablet Take 650 mg by mouth 2 (two) times a day as needed for pain (at least 4 hours apart).     ? albuterol (PROAIR HFA;PROVENTIL HFA;VENTOLIN HFA) 90 mcg/actuation inhaler Inhale 2 puffs every 4 (four) hours as needed for shortness of breath.      ? gabapentin (NEURONTIN) 100 MG capsule Take 100 mg by mouth at bedtime.     ? Lactobacillus acidophilus 100 mg (1 billion cell) cap Take 1 capsule by mouth 2 (two) times a day.     ? melatonin 3 mg Tab tablet Take 3 mg by mouth at bedtime as needed.     ? metoprolol succinate (TOPROL-XL) 25 MG Take 1 tablet (25 mg total) by mouth daily. (Patient taking differently: Take 12.5 mg by mouth daily. )  0   ? omeprazole (PRILOSEC) 20 MG capsule Take 20 mg by mouth 3 (three) times a day. May take an additional dose x 1 daily for reflux symptoms     ? polyvinyl alcohol (LIQUIFILM TEARS) 1.4 % ophthalmic solution Administer 2 drops to both eyes every 4 (four) hours as needed for dry eyes.     ? potassium chloride (KLOR-CON) 20 mEq packet Take 20 mEq by mouth 2 (two) times a day.     ? potassium phos-sodium phos (K-PHOS NEUTRAL) 250 mg Tab tablet Take 1 tablet by mouth daily.     ? thiamine 100 MG tablet Take 100 mg by mouth daily.     ? tiZANidine (ZANAFLEX) 4 MG tablet Take 4 mg by mouth at bedtime.     ? traZODone (DESYREL) 100 MG tablet Take 100 mg by mouth at bedtime.              No current facility-administered medications for this visit.      Allergies   Allergen Reactions   ? Fentanyl      Other reaction(s): Edema         ? Amoxicillin Nausea Only     diarrhea   ? Ativan [Lorazepam] Other (See Comments)     Hallucinations   ? Morphine Itching   ? Other Environmental Allergy      seasonal     Immunization History   Administered Date(s) Administered   ? Influenza, inj, historic,unspecified 11/17/2015       Review of Systems   Patient denies fever, chills, headache, lightheadedness, dizziness, chest pain, palpitations, abdominal pain, n/v,  constipation,dysuria, frequency, burning or pain with urination.  Other than stated in HPI all other review of systems is negative.         Physical Exam   In order to maintain social distancing during the COVID pandemic, a visual exam was completed.     Vitals: /96, HR 92,  resp 28, temp 86%    Patient is well nourished and no acute distress.  Head is AT/NC, EOM intact. Respiratory effort normal. No visible LE edema. Alert and oriented, face symmetric. No visible skin issues or rashes. Mood euthymic              Labs:    Recent Results (from the past 240 hour(s))   Comprehensive Metabolic Panel   Result Value Ref Range    Sodium 141 136 - 145 mmol/L    Potassium 3.5 3.5 - 5.0 mmol/L    Chloride 104 98 - 107 mmol/L    CO2 27 22 - 31 mmol/L    Anion Gap, Calculation 10 5 - 18 mmol/L    Glucose 53 (LL) 70 - 125 mg/dL    BUN 6 (L) 8 - 28 mg/dL    Creatinine 0.48 (L) 0.60 - 1.10 mg/dL    GFR MDRD Af Amer >60 >60 mL/min/1.73m2    GFR MDRD Non Af Amer >60 >60 mL/min/1.73m2    Bilirubin, Total 2.1 (H) 0.0 - 1.0 mg/dL    Calcium 6.8 (L) 8.5 - 10.5 mg/dL    Protein, Total 4.3 (L) 6.0 - 8.0 g/dL    Albumin 1.5 (L) 3.5 - 5.0 g/dL    Alkaline Phosphatase 249 (H) 45 - 120 U/L    AST 59 (H) 0 - 40 U/L    ALT 28 0 - 45 U/L   Prealbumin   Result Value Ref Range    Prealbumin 5.1 (L) 19.0 - 38.0 mg/dL   INR   Result Value Ref Range    INR 1.33 (H) 0.90 - 1.10   HM1 (CBC with Diff)   Result Value Ref Range    WBC 3.4 (L) 4.0 - 11.0 thou/uL    RBC 2.34 (L) 3.80 - 5.40 mill/uL    Hemoglobin 9.2 (L) 12.0 - 16.0 g/dL    Hematocrit 28.1 (L) 35.0 - 47.0 %     (H) 80 - 100 fL    MCH 39.3 (H) 27.0 - 34.0 pg    MCHC 32.7 32.0 - 36.0 g/dL    RDW 15.8 (H) 11.0 - 14.5 %    Platelets 179 140 - 440 thou/uL    MPV 11.6 8.5 - 12.5 fL   Manual Differential   Result Value Ref Range    Total Neutrophils % 56 50 - 70 %    Lymphocytes % 24 20 - 40 %    Monocytes % 11 (H) 2 - 10 %    Eosinophils %  9 (H) 0 - 6 %    Basophils % 1 0 - 2 %    Immature Granulocyte % - Manual 0 <=0 %    Total Neutrophils Absolute 1.9 (L) 2.0 - 7.7 thou/ul    Lymphocytes Absolute 0.8 0.8 - 4.4 thou/uL    Monocytes Absolute 0.4 0.0 - 0.9 thou/uL    Eosinophils Absolute 0.3 0.0 - 0.4 thou/uL    Basophils Absolute 0.0 0.0 - 0.2 thou/uL     Immature Granulocyte Absolute - Manual 0.0 <=0.0 thou/uL    Platelet Estimate Normal Normal    Polychromasia 1+ (!) Negative    Target Cells 3+ (!) Negative   COVID-19 Virus PCR MRF    Specimen: Respiratory   Result Value Ref Range    COVID-19 VIRUS SPECIMEN SOURCE Nasopharyngeal     2019-nCOV Not Detected    C. Diff Toxin By PCR    Specimen: Stool   Result Value Ref Range    C.Difficile Toxigenic by PCR Negative Negative    Ribotype 027/NAP1/B1 Presumptive Negative Presumptive Negative   CK Total   Result Value Ref Range    CK, Total 29 (L) 30 - 190 U/L   Comprehensive Metabolic Panel   Result Value Ref Range    Sodium 136 136 - 145 mmol/L    Potassium 4.0 3.5 - 5.0 mmol/L    Chloride 105 98 - 107 mmol/L    CO2 25 22 - 31 mmol/L    Anion Gap, Calculation 6 5 - 18 mmol/L    Glucose 60 (L) 70 - 125 mg/dL    BUN 4 (L) 8 - 28 mg/dL    Creatinine 0.50 (L) 0.60 - 1.10 mg/dL    GFR MDRD Af Amer >60 >60 mL/min/1.73m2    GFR MDRD Non Af Amer >60 >60 mL/min/1.73m2    Bilirubin, Total 1.5 (H) 0.0 - 1.0 mg/dL    Calcium 7.0 (L) 8.5 - 10.5 mg/dL    Protein, Total 4.3 (L) 6.0 - 8.0 g/dL    Albumin 1.3 (L) 3.5 - 5.0 g/dL    Alkaline Phosphatase 203 (H) 45 - 120 U/L    AST 51 (H) 0 - 40 U/L    ALT 22 0 - 45 U/L   Magnesium   Result Value Ref Range    Magnesium 1.3 (L) 1.8 - 2.6 mg/dL   Phosphorus   Result Value Ref Range    Phosphorus 2.5 2.5 - 4.5 mg/dL   HM2(CBC w/o Differential)   Result Value Ref Range    WBC 3.8 (L) 4.0 - 11.0 thou/uL    RBC 2.32 (L) 3.80 - 5.40 mill/uL    Hemoglobin 9.2 (L) 12.0 - 16.0 g/dL    Hematocrit 28.4 (L) 35.0 - 47.0 %     (H) 80 - 100 fL    MCH 39.7 (H) 27.0 - 34.0 pg    MCHC 32.4 32.0 - 36.0 g/dL    RDW 14.9 (H) 11.0 - 14.5 %    Platelets 211 140 - 440 thou/uL    MPV 11.8 8.5 - 12.5 fL   INR   Result Value Ref Range    INR 1.45 (H) 0.90 - 1.10         Assessment:  1. Pneumonia of left lower lobe due to infectious organism     2. Acute respiratory failure with hypoxia (H)     3. Severe  "malnutrition (H)     4. Depression, unspecified depression type         Plan:   At this time, I recommend that patient be seen in the hospital setting due to no improvement with antibiotics.  We are unable to neb in this facility and so I would recommend hospitalization for respiratory diagnostics and treatment.  I did discuss goals of care and the patient is agreeable to hospitalization however she is concerned that she will go to the ER and be seen and be sent right back.  I did ask her what her health care goal would be if her condition was deemed poor with poor progress and she states she would then consider Hospice \"I guess\".     Ultimately, after conversation she is agreeable to be seen in the ER and thus she was sent to [facility] per her request.     At the end of the discussion, She states she does not want to take the Lexapro and would like to go back on the mirtazapine for which she was taking before.  She is unsure of why that was discontinued.  I will dc lexapro for now and if she returns consider restarting Mirtazapine.       Electronically signed by: Leonela Regan CNP            "

## 2021-06-22 NOTE — PROGRESS NOTES
Called patient to discuss medications from the pain center, her  had a flat tire so she had to reschedule- scripts sent to the pharmacy today include tramadol and gabapentin. She has already made an appointment to get in as soon as possible.       Millie Weaver, [facility]

## 2021-06-22 NOTE — TELEPHONE ENCOUNTER
Patient calls to request another bridge of medication until her apt.  Last apt: 9/28  Patient cancelled on 11/28  Bridge RX sent in the interim to next apt on 01/02  Patient now cancelled apt on 01/02(today)  She is now requesting another refill of both tramadol and gabapentin until the rescheduled apt: 1/25    Did not que any medication/please advise as to next step

## 2021-06-23 NOTE — TELEPHONE ENCOUNTER
Patient calls to report that she may go back to the hospital due to recent back injury.  Patient has cancelled her follow up apt on 2/5.  Patient has not been seen since 9/28 with CM  Cancelled on 11/28  Cancelled on 1/2  Cancelled on 1/25  Patient has been getting bridge prescriptions to each rescheduled apt and then cancelling.  Patient is just offering an update to provider.

## 2021-06-23 NOTE — TELEPHONE ENCOUNTER
Called patient to discuss, she reports her witnessed fall has increased her pain, I have requested that she follow up with urgent care for evaluation of her right shoulder as she is reporting a burning sensation that radiates down her arm, she has ROM that is at baseline but her pain is waking her at night. She reports that she is trying to get in but with the cold weather it is difficult and her daughter is unable to take her until later this week. No increase in chronic pain medications for her acute issue. However if assessed the provider who assesses her is able to determine the need for additional medication based on the assessment.     She has a scheduled appointment next Tuesday 2/5, scripts have been sent and the addition of ES tylenol.     Millie Weaver, [facility]

## 2021-06-23 NOTE — TELEPHONE ENCOUNTER
Medication refill request: Zanaflex  Last filled on 12/19    LV  9/28    NV  1/25    Routed to Crystal

## 2021-06-24 NOTE — TELEPHONE ENCOUNTER
Return call to patient who stated Dr. Fowler would not do injection unless patient was able to hold Brilinta for 7 days so she scheduled injection with [facility] who required only 5 day hold - patient stated [facility] did not require anything in writing just instructed her to contact cardiologist for approval - reassured patient that all documentation would be viewable to staff through CareEverywhere - patient verbalized understanding.  mg

## 2021-06-24 NOTE — TELEPHONE ENCOUNTER
----- Message from Naldo Barrios sent at 2/13/2019 10:47 AM CST -----  Contact: Pt  General phone call:    Caller: Pt    Primary cardiologist: Dr Reynolds    Detailed reason for call: Pt is to have a back surg/inj? And was to be off BRILINTA for 7 days - She wanted to clear this with Dr Reynolds - wanted a call back to discuss please    New or active symptoms? na    Best phone number: home    Best time to contact: any - she asked for SOONER rather than later please    Ok to leave a detailed message? yes    Device? NA      Additional Info: thank you \

## 2021-06-24 NOTE — PROGRESS NOTES
Referral from pain clinic for CCC.  Sent to [facility] care coordination team.

## 2021-06-24 NOTE — TELEPHONE ENCOUNTER
Patient calls to request refill of tizanidine.  Appears this was not re-ordered yesterday.    Please review and re-order if this is appropriate

## 2021-06-24 NOTE — TELEPHONE ENCOUNTER
Left message for Dr. Fowler's asst informing her of Dr. Reynolds's response and that hold order form would be faxed once signature obtained from Dr. Reynolds - requested call back with any questions.  mg

## 2021-06-24 NOTE — TELEPHONE ENCOUNTER
Rec'd return call from Munira, Dr. Fowler's asst who confirmed receipt of Dr. Reynolds's written order yesterday that patient should no hold Brilinta for injection/procedure due to timing of PCI - Munira stated procedure is elective and Dr. Fowler does not want to proceed until patient can be off Brilinta which will be communicated to patient.  Munira was also aware that patient had stopped Brilinta without obtaining specific instructions from cardiologist.  mg

## 2021-06-24 NOTE — TELEPHONE ENCOUNTER
----- Message from Gi Dooley sent at 2/13/2019  2:01 PM CST -----  Contact: Munira     Caller: Munira  Primary cardiologist:     Detailed reason for call: Please call back  [facility] to clarify Dr. Reynolds denied request regarding blood thinner.     New or active symptoms? no  Best phone number: [phone]  Best time to contact: any  Ok to leave a detailed message? Yes  Device? no

## 2021-06-24 NOTE — TELEPHONE ENCOUNTER
Prior Authorization Request  Who s requesting:  Pharmacy /[place]  Pharmacy Name and Location: [facility]  Medication Name: hydromorphone 2mg tabs  Insurance Plan: [employer]  Insurance Member ID Number:  [id number]  Informed patient that prior authorizations can take up to 10 business days for response:   Yes, 72 hrs  Okay to leave a detailed message: Yes

## 2021-06-24 NOTE — TELEPHONE ENCOUNTER
Spoke with Dr Atkinson.  She may hold brilinta for five days prior to the procedure and then resume as soon as able to.

## 2021-06-24 NOTE — TELEPHONE ENCOUNTER
Rec'd return call from Munira who confirmed receipt of message indicating 5 day hold for Brilinta approved - Munira stated she will need to update Dr. Fowler because their protocol is 7 day hold.  mg

## 2021-06-24 NOTE — TELEPHONE ENCOUNTER
"Response noted.  mg    Return message rec'd 2-15-19 @ 4792:        Just sent msg to Dr Atkinson. Spoke with her and patient is bedridden with pain and would \"rather die\" than not get this injection.  Since was synergy stent and over 6 mo may be ok to hold.  Waiting to hear back from Dr China Reynolds, Sheree Puri MD       "

## 2021-06-24 NOTE — TELEPHONE ENCOUNTER
"Return call to patient - informed her that Dr. Reynolds had signed form from [facility] yesterday indicating she could not stop Brilinta for injection because she had PCI 4-20-18 and needs to be on DAPT for 1yr.    Patient stated she fell 3wks ago, has trouble walking, lives alone and needs procedure urgently otherwise she will have to go into nursing home - patient stopped Brilinta on her own 3 days ago and did not take dose this am, hoping to have procedure next wk when appt is being held for her - patient is requesting Dr. Reynolds give approval for her to proceed so she doesn't lose her appt.    Re-iterated to patient that Dr. Reynolds had already given her recommendations and since she had stopped Brilinta on her own, she has gone against Dr. Reynolds's orders which may have increased her risk of a cardiac event.  Attempted to explain to patient that ortho physician should have contacted Dr. Reynolds directly if they felt procedure could not wait until after 4-20-19.  Patient stated \"she needs the procedure and would rather die, than not have it\" - patient requested to speak directly to Dr. Reynolds to explain her situation and urgency of procedure.  Informed patient that update/request would be forwarded to Dr. Reynolds.    Please review patient update and return her call when able.  mg  "

## 2021-06-24 NOTE — TELEPHONE ENCOUNTER
Pt calls for refill of Dilaudid. Hasn't been seen in clinic since 09/28.   Please call to schedule follow-up with Crystal and route back to support pool.

## 2021-06-24 NOTE — TELEPHONE ENCOUNTER
----- Message from Mirela Khan sent at 2/18/2019  9:09 AM CST -----  Regarding: SURGERY AT [place]      General phone call:  DR BRASWELL, PLEASE CALL TODAY WITH AN ANSWER IF SHE CAN GO OFF OF THE BLOOD THINNERS OR NOT.  FAX WAS SENT OVER THIS MORNING FROM DR FRANCISCO FROM [place], GET THE BALL ROLLING AGAIN ABOUT THIS PROCEDURE, SHE DOES NOT WANT THIS TO GET LOST IN A PILE OF PAPERS. IF ALL GOES WELL PATIENT CAN SCHEDULE PROCEDURE.   PATIENT NEEDS AN ANSWER FROM DR BRASWELL AS SOON AS SHE CAN, AND FAX NEEDS TO BE SENT BACK TO [facility], PLEASE CALL  Caller: PATIENT  Primary cardiologist: DR BRASWELL  Detailed reason for call: SEE ABOVE  New or active symptoms? YES,  Best phone number: [phone]  Best time to contact: ANY TIME  Ok to leave a detailed message? YES  Device? NO    Additional Info:

## 2021-06-24 NOTE — TELEPHONE ENCOUNTER
----- Message from Naldo Barrios sent at 2/19/2019  4:29 PM CST -----  Contact: Pt and Pt daughter  General phone call:    Caller: Pt    Primary cardiologist: Dr Reynolds    Detailed reason for call: In regards to previous messages for HOLD orders    [facility] through [facility] radiology will now do her Injection (fractured vertebrae) 2/25  Wanted to let Dr Reynolds know and to confirm WHEN should she STOP taking blood thinner exactly?   Daughter (Coleen) or Pt would like a call back -- [phone]      New or active symptoms? na  Best phone number: above  Best time to contact: any  Ok to leave a detailed message? yes  Device? no    Additional Info:

## 2021-06-25 NOTE — PROGRESS NOTES
Progress Notes by Millie Weaver CNP at 11/15/2017 10:58 AM     Author: Millie Weaver CNP Service: -- Author Type: Nurse Practitioner    Filed: 11/15/2017 11:45 AM Date of Service: 11/15/2017 10:58 AM Status: Signed    : Millie Weaver CNP (Nurse Practitioner)       PAIN CLINIC FOLLOW UP PROGRESS NOTE    CC:  Chief Complaint   Patient presents with   ? Arm Pain   ? Chest Pain       HPI  Minerva Blanco is a 72 y.o. female who presents for evaluation   Chief Complaint   Patient presents with   ? Arm Pain   ? Chest Pain    that is causing continued pain. Since the last visit the patient denies any trips to the urgent care or ED specifically for their pain. The patient denies any new medications, diagnoses since the last visit. Specific questions indicated the patient wanted addressed today include: patient is still awaiting for the medical cannabis to get approved due to her paperwork issues. States that with her recent left shoulder surgery surgery went ok but is hurts and now she is straining her g tube and it feels that it is broken, her skin is very hard surrounding the tissue. Still has an open wound in her chest, it is reportedly healing but very slow.       Major issues:  1. Chronic pain syndrome        Today the pain is located in their left shoulder, abdomen and is described as sharp and achy when it is aggravated it lasts for constant, and is rated at a 8 on a scale of 1-10  Associated symptoms: Denies any loss of bladder control, fevers/chills, unintentional weight loss, weakness, numbness or pain that interferes with sleep.   Aggravating factors include: Repeat procedures  Alleviating factors: Medications and rest  Adverse effects of medications: NONE   Functional symptoms: F8   Current subjective treatment efficacy: Poor      Previous Medical History  History   Alcohol Use No     History   Drug Use No     History   Smoking Status   ? Former Smoker   ? Packs/day: 1.00   ?  Years: 15.00   ? Types: Cigarettes   ? Quit date: 1/1/1998   Smokeless Tobacco   ? Never Used       Pertinent Pain Medications/interventions:  She currently takes oxycodone 5 mg 6 per day and butrans 7.5 mcgs still trying to get some medical cannabis approved.     Review of Systems:  12 point systems were reviewed with pt as documented on pt health form and the patient denies any new diagnosis or changes in 12 point system review since the last visit. Recent left shoulder surgery     Physical Exam  Vitals:    11/15/17 1111   BP: 94/52   Patient Site: Right Arm   Patient Position: Sitting   Cuff Size: Adult Regular   Pulse: 82   Resp: 18   Weight: 103 lb (46.7 kg)   Height: 5' (1.524 m)     General- patient is alert and oriented, in NAD, well-groomed, well-nourished  Psych- Judgment and insight normal, AOx4, recent and remote memory normal, mood and affect normal  Eyes- pupils are equal and reactive, conjunctiva is clear bilaterally, no ptosis is noted.   Respiratory- breathing is non-labored  Cardiovascular- extremities warm and well perfused, no peripheral edema or varicosities.  Musculoskeletal- gait is normal, extremities with no joint swelling, erythema, or warmth.  Neuro- normal strength, no gait abnormalities, normal sensation to pain, temperature, light touch.  Integumentary- no rashes, dermatitis or discolorations noted throughout, open wound noted to the chest cavity, localized tenderness surrounding the G-tube in the left  quadrant of her abdomen, erythema is noted.  Distention is also present.  Patient does complain about increased night sweats    Medications    Current Outpatient Prescriptions:   ?  acetaminophen (TYLENOL) 160 mg/5 mL (5 mL) Soln solution, 20.3 mL by G-tube route every 6 (six) hours., Disp: , Rfl:   ?  AMIODARONE HCL (AMIODARONE, BULK, MISC), 20 mL by G-tube route 2 (two) times a day. 20 mg/ml   Give 20 ml bid for 2 weeks then qd  (Dose 400 mg), Disp: , Rfl:   ?  BUTRANS 7.5 mcg/hour  PTWK patch, Place 1 patch on the skin every 7 days., Disp: 4 patch, Rfl: 0  ?  docusate sodium (COLACE) 50 mg/5 mL oral liquid, 100 mg by G-tube route 2 (two) times a day., Disp: , Rfl:   ?  gabapentin (NEURONTIN) 250 mg/5 mL solution, 400 mg by G-tube route every 8 (eight) hours., Disp: , Rfl:   ?  guar gum (NUTRISOURCE FIBER) Pack powder packet, 1 packet by G-tube route 2 (two) times a day., Disp: , Rfl:   ?  hydrOXYzine (VISTARIL) 25 MG capsule, Take 1 capsule (25 mg total) by mouth 3 (three) times a day as needed for itching., Disp: 20 capsule, Rfl: 0  ?  Lactobacillus rhamnosus GG (CULTURELLE) 10-15 Billion cell capsule, Take 1 capsule by mouth bedtime. 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, Disp: , Rfl:   ?  loperamide (IMODIUM) 1 mg/5 mL solution, Take 4 mg by mouth 4 (four) times a day as needed for diarrhea., Disp: , Rfl:   ?  melatonin 1 mg Tab tablet, 3 mg by G-tube route bedtime as needed., Disp: , Rfl:   ?  METOPROLOL SUCCINATE ORAL, 50 mg by G-tube route 2 (two) times a day. 10 mg/ml solution, Disp: , Rfl:   ?  multivit-mins-ferrous gluconat (CERTAVITE-ANTIOXID, IRON GLUC,) 9 mg iron/ 15 mL (15 mL) Liqd, Take 10 mL by mouth daily., Disp: , Rfl:   ?  polyethylene glycol (MIRALAX) 17 gram packet, 17 g by G-tube route daily., Disp: , Rfl:   ?  simethicone (MYLICON) 40 mg/0.6 mL drops, 40 mg by G-tube route every 6 (six) hours as needed (cramping)., Disp: , Rfl:   ?  TiZANidine (ZANAFLEX) 4 MG capsule, Take 1 capsule (4 mg total) by mouth 3 (three) times a day., Disp: 20 capsule, Rfl: 0  ?  traZODone (DESYREL) 50 MG tablet, 100 mg by G-tube route bedtime. , Disp: , Rfl:   ?  zinc sulfate, bulk, Powd, 220 mg by G-tube route daily. 88mg/ml  (2.5 ml  Dose), Disp: , Rfl:   ?  buprenorphine (BUTRANS) 10 mcg/hour PTWK patch, Place 1 patch on the skin every 7 days., Disp: 4 patch, Rfl: 0  ?  oxyCODONE (ROXICODONE) 5 MG immediate release tablet, Take 1 tablet (5 mg total) by mouth every 4 (four) hours as needed for  pain., Disp: 180 tablet, Rfl: 0    Lab:  Last UDS on 10/4/2017 was positive for the currently prescribed narcotics. Please see the scanned document from the outside lab. This was reviewed with the patient.      Imaging:  No new imaging.      Recent   Dated 11/15/2017 was reviewed with the patient today.       Assessment:   Minerva Blanco is a 72 y.o. female seen in clinic today for   Chief Complaint   Patient presents with   ? Arm Pain   ? Chest Pain   They are here for follow up and continued medical management of their pain. Today we reviewed the lab work of the UDT test and the results are expected because of the prescribed narcotics found in the urine drug screen.     Patient presents back to the clinic today to continue her current pain treatment plan.  Sheree has multiple comorbidities that put her at risk for infection as well as chronic pain.  Patient is currently in the process of trying to register for medical cannabis however she is still looking for some of the documentation to be completed.  Patient did recently have a left shoulder surgery she is recovering well from this she did receive some oxycodone from the surgeon for this and wanted to update us that this had happened.  She is taking a max of 6 oxycodone per day some days with a acute pain from surgery however she does report there are times she can go at least 8 hours in between dosing.  I have encouraged her to decrease her amount of oxycodone 5 mg tablets to 3-4 per day for her chronic pain and increase up to 6 max per day after procedures.  Patient has many upcoming procedures due to her open chest wound, esophageal perforation, G-tube replacements and shoulder surgeries.  Patient is also malnourished she is getting TPN this is been adjusted to increase her protein patient does state however she has had a difficult time getting her vitamins from the pharmacy as well as any liquid iron.  Patient is anemic and this needs to be  addressed by her primary, I did talk to the patient about possibly using children's liquid products to help replaced some of her needed elements this should be discussed however with her nutritionist and her primary care.  At this time I will increase her Butrans patch up to 10 mics per hour this is been sent to the pharmacy electronically.  We also discussed the difficulty in getting her oxycodone approved by her insurance company due to multiple providers that are prescribing it.  We will continue her plan of dispensing 180 tablets for 30 days allowing her the max of 6 per day patient has no further questions she knows to call if she has any concerns      Patients current MME is 59  Patient to call clinic for next month's prescription 5 days in advance. Based on the patients response to their previous narcotic therapy and quality of life, which includes their participation in ADLs, I recommend that the narcotics therapy continue, however the changes listed below will be incorporated into their plan of care.     Patient set goals to   1.  2.    Plan:   Interventions-     Follow up in 4 weeks to evaluate the effectiveness of the narcotic and other treatment interventions ordered today.   Will increase your Butrans patch to 10 mcg/hr   Continue your oxycodone- recommend 3-4 per day and max of 6 per day after procedures, more of a sliding scale  Please go to the ED and have them check your G tube due to the recent inflammation process and increased redness, tenderness to light touch as well as your night sweats.    No narcotic agreement is signed at the clinic due to the multiple procedures that you have upcoming.      Orders placed today  Medications that were ordered today   Requested Prescriptions     Signed Prescriptions Disp Refills   ? oxyCODONE (ROXICODONE) 5 MG immediate release tablet 180 tablet 0     Sig: Take 1 tablet (5 mg total) by mouth every 4 (four) hours as needed for pain.   ? buprenorphine (BUTRANS)  10 mcg/hour PTWK patch 4 patch 0     Sig: Place 1 patch on the skin every 7 days.     No orders of the defined types were placed in this encounter.      UDT/SWAB:  Patient required a random Urine Drug Testing, due to the need to comply with [place] Model Policy Guidelines and CDC Guideline for the use of any controlled substances. This is to ensure that patient is compliant with treatment, and monitor for risks such as diversion, abuse, or any other aberrant behaviors. Patient is either being considered for or taking a controlled substance. Unexpected findings will be discussed and treatment decision may be adjusted. Testing is being implemented across the board randomly w/o bias related to age, race, gender, socioeconomic status or [employer] affiliation.    The patient understand todays plan and has their questions answered in regards to expectations and current treatment plan.     SAFETY REMINDERS  No alcohol while taking controlled substances. Alcohol is not an illegal substance, it is unsafe to use in combination. It is a build up of substances in the body that can be extremely hazardous and may cause respirations to slow to a dangerous rate resulting in hospitalization, brain damage, or death.    Opioid medications have been associated with sharp rise in unintentional overdose and death.  Overdose is a condition characterized by the consumption in excess of a particular drug causing adverse effects. This can happen b/c you are sick, accidentally or intentionally took an extra dose, are on multiple medication that can interact. Someone took your medication and they are not use to the medication.  Symptoms of overdose include:   !breathing slow and shallow, erratic or not at all  !pinpoint pupils, hallucinations  !confusion  !muscle jerks, slack muscles   !extreme sleepiness or loss of alertness   !awake but not able to talk   !face pale or clammy, vomiting, for lighter skinned people, the skin tone turns  bluish purple, for darker skinned people, it turns grayish or ashen   If in a situation where overdose is a concern engage the emergency response system (dial 911).    In one study it was noted that 80% of unintentional overdoses occurred in people who were taking a combination of opioids and benzodiazepines.    Do not sell, loan, borrow or share your opioid medication with anyone. Deaths have occurred as a result of this practice. It is illegal and patients are being prosecuted.     Prevent unexpected access/loss of medication: Keep medication locked. Only carry what you need with you.    Millie Weaver, [facility]  [address]  [place] [address]  Ph: [phone]  Fax: [phone]

## 2021-06-25 NOTE — PROGRESS NOTES
Progress Notes by Millie Weaver CNP at 10/17/2017  8:36 AM     Author: Millie Weaver CNP Service: -- Author Type: Nurse Practitioner    Filed: 10/17/2017  1:00 PM Date of Service: 10/17/2017  8:36 AM Status: Signed    : Millie Weaver CNP (Nurse Practitioner)       PAIN CLINIC FOLLOW UP PROGRESS NOTE    CC:  Chief Complaint   Patient presents with   ? Arm Pain   ? Chest Pain       HPI  Minerva Blanco is a 72 y.o. female who presents for evaluation   Chief Complaint   Patient presents with   ? Arm Pain   ? Chest Pain    that is causing continued pain. Since the last visit the patient denies any trips to the urgent care or ED specifically for their pain. The patient denies any new medications, diagnoses since the last visit. Specific questions indicated the patient wanted addressed today include: To discuss her ongoing pain management needs as well as her outpatient procedures and acute on chronic pain.  Open chest wound to the right with packing      Major issues:  1. Chronic pain syndrome        Today the pain is located in their in her chest and shoulder that is rated as constant sore and sharp, and is rated at a 7 on a scale of 1-10  Associated symptoms: Denies any loss of bladder control, fevers/chills, unintentional weight loss, weakness, numbness or pain that interferes with sleep.   Aggravating factors include: Increased activity  Alleviating factors: Occasions  Adverse effects of medications: NONE   Functional symptoms: 8  Current subjective treatment efficacy: Poor      Previous Medical History  History   Alcohol Use No     History   Drug Use No     History   Smoking Status   ? Former Smoker   ? Packs/day: 1.00   ? Years: 15.00   ? Types: Cigarettes   ? Quit date: 1/1/1998   Smokeless Tobacco   ? Never Used       Pertinent Pain Medications/interventions:  She currently takes oxycodone 5 mg up to 6 per day    Review of Systems:  12 point systems were reviewed with pt as  documented on pt health form and the patient denies any new diagnosis or changes in 12 point system review since the last visit.     Physical Exam  Vitals:    10/17/17 0841   BP: (!) 86/48   Patient Site: Right Arm   Patient Position: Sitting   Cuff Size: Adult Regular   Pulse: 77   Weight: 103 lb (46.7 kg)   Height: 5' (1.524 m)     General- patient is alert and oriented, in NAD, well-groomed, well-nourished  Psych- Judgment and insight normal, AOx4, recent and remote memory normal, mood and affect normal  Eyes- pupils are equal and reactive, conjunctiva is clear bilaterally, no ptosis is noted.   Respiratory- breathing is non-labored  Cardiovascular- extremities warm and well perfused, no peripheral edema or varicosities.  Musculoskeletal- gait is normal, extremities with no joint swelling, erythema, or warmth.  Neuro- normal strength, no gait abnormalities, normal sensation to pain, temperature, light touch.  Integumentary- no rashes, dermatitis or discolorations noted throughout, no open wounds noted.    Medications    Current Outpatient Prescriptions:   ?  acetaminophen (TYLENOL) 160 mg/5 mL (5 mL) Soln solution, 20.3 mL by G-tube route every 6 (six) hours., Disp: , Rfl:   ?  AMIODARONE HCL (AMIODARONE, BULK, MISC), 20 mL by G-tube route 2 (two) times a day. 20 mg/ml   Give 20 ml bid for 2 weeks then qd  (Dose 400 mg), Disp: , Rfl:   ?  docusate sodium (COLACE) 50 mg/5 mL oral liquid, 100 mg by G-tube route 2 (two) times a day., Disp: , Rfl:   ?  gabapentin (NEURONTIN) 250 mg/5 mL solution, 400 mg by G-tube route every 8 (eight) hours., Disp: , Rfl:   ?  guar gum (NUTRISOURCE FIBER) Pack powder packet, 1 packet by G-tube route 2 (two) times a day., Disp: , Rfl:   ?  hydrOXYzine (VISTARIL) 25 MG capsule, Take 1 capsule (25 mg total) by mouth 3 (three) times a day as needed for itching., Disp: 20 capsule, Rfl: 0  ?  Lactobacillus rhamnosus GG (CULTURELLE) 10-15 Billion cell capsule, Take 1 capsule by mouth  bedtime. 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, Disp: , Rfl:   ?  loperamide (IMODIUM) 1 mg/5 mL solution, Take 4 mg by mouth 4 (four) times a day as needed for diarrhea., Disp: , Rfl:   ?  melatonin 1 mg Tab tablet, 3 mg by G-tube route bedtime as needed., Disp: , Rfl:   ?  METOPROLOL SUCCINATE ORAL, 50 mg by G-tube route 2 (two) times a day. 10 mg/ml solution, Disp: , Rfl:   ?  multivit-mins-ferrous gluconat (CERTAVITE-ANTIOXID, IRON GLUC,) 9 mg iron/ 15 mL (15 mL) Liqd, Take 10 mL by mouth daily., Disp: , Rfl:   ?  polyethylene glycol (MIRALAX) 17 gram packet, 17 g by G-tube route daily., Disp: , Rfl:   ?  simethicone (MYLICON) 40 mg/0.6 mL drops, 40 mg by G-tube route every 6 (six) hours as needed (cramping)., Disp: , Rfl:   ?  TiZANidine (ZANAFLEX) 4 MG capsule, Take 1 capsule (4 mg total) by mouth 3 (three) times a day., Disp: 20 capsule, Rfl: 0  ?  traZODone (DESYREL) 50 MG tablet, 100 mg by G-tube route bedtime. , Disp: , Rfl:   ?  zinc sulfate, bulk, Powd, 220 mg by G-tube route daily. 88mg/ml  (2.5 ml  Dose), Disp: , Rfl:   ?  buprenorphine (BUTRANS) 7.5 mcg/hour PTWK patch, Place 1 patch on the skin every 7 days., Disp: 4 patch, Rfl: 0  ?  oxyCODONE (ROXICODONE) 5 MG immediate release tablet, Take 1 tablet (5 mg total) by mouth every 4 (four) hours as needed for pain., Disp: 180 tablet, Rfl: 0    Lab:  Last UDS on 10/4/2017 was positive for the currently prescribed narcotics. Please see the scanned document from the outside lab. This was reviewed with the patient.      Imaging:  No new imaging.      Recent   Dated 10/17/2017 was reviewed with the patient today.       Assessment:   Minerva Blanco is a 72 y.o. female seen in clinic today for   Chief Complaint   Patient presents with   ? Arm Pain   ? Chest Pain   . They are here for follow up and continued medical management of their pain. Today we reviewed the lab work of the UDT test and the results are expected because of the prescribed narcotics found in  the urine drug screen.     This patient has presented to the office to continue her pain treatment program, the patient is currently still undergoing procedures as on outpatient basis for her breast cancer treatments as well as her esophageal perforation and open wound in her chest on the right side.  Patient states that her left chest still continues to hurt if not more than before when she saw me.  The patient is interested in medical cannabis she is definitely a candidate I have registered her today to expedite the process I will refer her to the [facility] who will sit down with her after she receives her email from the [facility] for certification and walk her through the process.  The patient agrees with this plan and would like to move forward with this I have also ordered a Butrans patch to be initiated for her for long-term pain management this cannot be applied to her skin directly and avoid her esophageal tract altogether.  Patient also has a G-tube but is unable to put any extended release down this because she has to crush the medication.  The patch will definitely help pass any of the gastric absorption issues.  Patient also saw her doctor recently who had worked her up previously for malnourishment patient tells me she had forgotten about this when I have discussed this with her earlier it appears that she is on track.  Patient also states she saw her nutritionist recently who increased her amount of protein through her tube feedings.    Going forward the patient may need dual therapy for her pain control given the numerous amount of pain generators that she is currently experiencing.  A Butrans patch as well as medical cannabis may be a good option for her eventually wean her down off of the oxycodone.  I recently filled the oxycodone in anticipation that it may take a while to get the Butrans patch approved.  Like I like her to follow-up with me in 1 month to review her  interventions and her reactions to them  Patients current MME is 45  Patient to call clinic for next month's prescription 5 days in advance. Based on the patients response to their previous narcotic therapy and quality of life, which includes their participation in ADLs, I recommend that the narcotics therapy continue, however the changes listed below will be incorporated into their plan of care.       Plan:   Interventions-     Follow up in 1 month to evaluate the effectiveness of the narcotic and other treatment interventions ordered today.  Refer to [facility] for continued registration of the medical cannabis program- they should call you for an appointment, telephone number [phone]  Butrans patch was ordered for long acting pain control- please apply once per week, must take this off if you ar having an MRI   Medical cannabis registration was completed today on line- once you get the email it is ok for you to register or go to [employer] for help in completing this.   Prescription Drug Management will be continued by the [facility] center for her chronic pain management- ok for surgeons to add short acting medications for post operative if needed  A narcotic contract was signed NOT signed by the patient today due to her upcoming procedures    Orders placed today  Medications that were ordered today   Requested Prescriptions     Signed Prescriptions Disp Refills   ? oxyCODONE (ROXICODONE) 5 MG immediate release tablet 180 tablet 0     Sig: Take 1 tablet (5 mg total) by mouth every 4 (four) hours as needed for pain.   ? buprenorphine (BUTRANS) 7.5 mcg/hour PTWK patch 4 patch 0     Sig: Place 1 patch on the skin every 7 days.     No orders of the defined types were placed in this encounter.      UDT/SWAB:  Patient required a random Urine Drug Testing, due to the need to comply with [place] [employer] Policy Guidelines and CDC Guideline for the use of any controlled substances. This is to ensure that patient is  compliant with treatment, and monitor for risks such as diversion, abuse, or any other aberrant behaviors. Patient is either being considered for or taking a controlled substance. Unexpected findings will be discussed and treatment decision may be adjusted. Testing is being implemented across the board randomly w/o bias related to age, race, gender, socioeconomic status or [employer] affiliation.    The patient understand todays plan and has their questions answered in regards to expectations and current treatment plan.     SAFETY REMINDERS  No alcohol while taking controlled substances. Alcohol is not an illegal substance, it is unsafe to use in combination. It is a build up of substances in the body that can be extremely hazardous and may cause respirations to slow to a dangerous rate resulting in hospitalization, brain damage, or death.    Opioid medications have been associated with sharp rise in unintentional overdose and death.  Overdose is a condition characterized by the consumption in excess of a particular drug causing adverse effects. This can happen b/c you are sick, accidentally or intentionally took an extra dose, are on multiple medication that can interact. Someone took your medication and they are not use to the medication.  Symptoms of overdose include:   !breathing slow and shallow, erratic or not at all  !pinpoint pupils, hallucinations  !confusion  !muscle jerks, slack muscles   !extreme sleepiness or loss of alertness   !awake but not able to talk   !face pale or clammy, vomiting, for lighter skinned people, the skin tone turns bluish purple, for darker skinned people, it turns grayish or ashen   If in a situation where overdose is a concern engage the emergency response system (dial 911).    In one study it was noted that 80% of unintentional overdoses occurred in people who were taking a combination of opioids and benzodiazepines.    Do not sell, loan, borrow or share your opioid medication  with anyone. Deaths have occurred as a result of this practice. It is illegal and patients are being prosecuted.     Prevent unexpected access/loss of medication: Keep medication locked. Only carry what you need with you.    Millie Weaver, [facility]  [address]  [place] [address]  Ph: [phone]  Fax: [phone]

## 2021-06-26 NOTE — PROGRESS NOTES
Progress Notes by Sheree Reynolds MD at 6/6/2018 10:10 AM     Author: Sheree Reynolds MD Service: -- Author Type: Physician    Filed: 6/6/2018  1:38 PM Encounter Date: 6/6/2018 Status: Signed    : Sheree Reynolds MD (Physician)           Click to link to [facility] HEART CARE NOTE    Thank you, Dr. Nino, for asking us to see Minerva Blanco at the [facility].      Assessment/Recommendations   Assessment:    1.  Coronary artery disease: Status post inferior myocardial infarction.  Continue on dual antiplatelet therapy for 1 year.  Aspirin for life.  Continue with cardiac rehab.  She has no anginal complaints today.  2.  Fatigue: May be secondary to metoprolol.  Decrease Toprol-XL dose from 100 mg a day down to 50 mg a day.  Heart rate and blood pressure low today.  3.  Atrial fibrillation: Isolated episode of atrial fibrillation during a surgery.  No known recurrent episodes.  If it does recur may need to reconsider resuming anticoagulation.  Follow-up in a few months.     History of Present Illness    Ms. Minerva Blanco is a 73 y.o. female with history of hypertension, dyslipidemia, extensive esophageal surgeries and recent inferior myocardial infarction status post PCI of proximal mid RCA who is here today for initial consultation with myself to establish care.  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 Wise with eventual resolution of her esophageal cutaneous fistula.  She has just begun eating again and continues to suffer from reflux.  Back in 3 she had atrial fibrillation with surgery and has not had any recurrent atrial for ablation episodes since that time.  She was taken off of amiodarone and Coumadin.  She reports feeling overall very fatigued since her heart attack.  She presented with chest  discomfort and nausea.  She is on the 100% occluded RCA and underwent 2 stent placements.  Echocardiogram showed left ventricular ejection fraction of 50% with inferolateral wall motion abnormality and moderate mitral regurgitation as well as tricuspid valve regurgitation.       Echocardiogram 4/21/2018    Left Ventricle: Normal left ventricular size.The calculated left ventricular ejection fraction is 58%.    The following segments are hypokinetic: basal inferolateral and mid inferolateral.    Right Ventricle: Normal right ventricular size and systolic function.    Mitral Valve: Moderate mitral regurgitation likely relate to inferolateral wall hypokinesis resulting in tethering of posterior leaflet. The jet is posterior directed and is eccentric.    Moderate tricuspid valve regurgitation.  No previous study for comparison.    Coronary angiogram 4/20/2018    Estimated blood loss was <20 ml.    A STENT SYNERGY MR 2.75X28 drug eluting stent was successfully placed.    Prox RCA lesion 100% stenosed.    A STENT SYNERGY MR 2.75X24 drug eluting stent was successfully placed.    Mid RCA lesion 80% stenosed.    The left ventricular size is normal. The left ventricular systolic function is normal. LV systolic pressure is normal. LV end diastolic pressure is normal. There are wall motion abnormalities in the left ventricle.    The ejection fraction is 50-55% by visual estimate.       Recommendations   ? Patient given specific instructions regarding care of arteriotomy site, activity restrictions, signs and symptoms of cardiac or vascular complications and to seek immediate medical evaluation should they occur.  ? Arterial sheath removed from femoral artery with closure device.  ? Femoral angiogram identifies arterial sheath placement suitable for closure device.  ? Continuation of dual antiplatelet therapy for 12 month(s).  ? Continue high dose statin therapy indefinitely.  ? Risk factor management for atherosclerosis.  ? Two  stents placed in RCA with narrow gap at RV branch origin                 Physical Examination Review of Systems   Vitals:    06/06/18 1025   BP: 92/50   Pulse: (!) 53   Resp: 10     Body mass index is 18.94 kg/(m^2).  Wt Readings from Last 3 Encounters:   06/06/18 (!) 97 lb (44 kg)   06/05/18 (!) 97 lb (44 kg)   05/31/18 (!) 97 lb 3.2 oz (44.1 kg)       General Appearance:   alert, no apparent distress   HEENT:  no scleral icterus; the mucous membranes are pink and moist                                  Neck: jugular venous pressure normal, no thyromegaly   Chest: the spine is straight and the chest is symmetric   Lungs:   respirations unlabored; the lungs are clear to auscultation   Cardiovascular:   regular rhythm with normal first and second heart sounds and no murmurs or gallops   Abdomen:  no organomegaly, masses, bruits, or tenderness; bowel sounds are present   Extremities: no cyanosis, clubbing, or edema   Skin: no xanthelasma    General: Weight Loss  Eyes: WNL  Ears/Nose/Throat: Nosebleeds  Lungs: Cough  Heart: WNL  Stomach: Heartburn  Bladder: WNL  Muscle/Joints: WNL  Skin: WNL  Nervous System: Daytime Sleepiness  Mental Health: WNL     Blood: Easy Bruising     Medical History  Surgical History Family History Social History   Past Medical History:   Diagnosis Date   ? Acute inferolateral myocardial infarction 4/20/2018   ? Anemia    ? Arthritis    ? Breast cancer 2007    right lumpectomy hx of breast cancer   ? Cough, persistent    ? Deaf, left    ? Esophagus perforation 01/22/2016   ? Fibromyalgia    ? GERD (gastroesophageal reflux disease)    ? Hiatal hernia    ? History of transfusion    ? HTN (hypertension) 5/2/2015   ? Hx antineoplastic chemotherapy 2007    hx of right lumpectomy   ? Hx of radiation therapy 2007    right breast lumpectomy   ? Hyperlipidemia    ? IBS (irritable bowel syndrome)    ? Insomnia    ? Low back pain radiating to left leg     with tingling   ? Meniere disease    ? Multiple  sclerosis    ? Neuropathy    ? 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 Paroxysmal atrial fibrillation    ? Sinus tachycardia     Past Surgical History:   Procedure Laterality Date   ? APPENDECTOMY     ? BACK SURGERY     ? BREAST LUMPECTOMY Right 2007    hx of right lumpectomy   ? CATARACT EXTRACTION Right    ? CV CORONARY ANGIOGRAM N/A 4/20/2018    Procedure: Coronary Angiogram;  Surgeon: Hudson Atkinson MD;  Location: [facility];  Service:    ? CV LEFT HEART CATHETERIZATION WITH LEFT VENTRICULOGRAM N/A 4/20/2018    Procedure: Left Heart Catheterization with Left Ventriculogram;  Surgeon: Hudson Atkinson MD;  Location: [facility] Lab;  Service:    ? ESOPHAGECTOMY  01/09/2017    distal, with spit fistula creation   ? ESOPHAGOGASTRODUODENOSCOPY N/A 5/3/2015    Procedure: ESOPHAGOGASTRODUODENOSCOPY;  Surgeon: Rocky Kendall MD;  Location: [facility];  Service:    ? EYE SURGERY     ? FRACTURE SURGERY     ? HERNIA REPAIR     ? PARTIAL GASTRECTOMY  01/09/2017    Proximal   ? partial resection distal clavicle, debridement, decompression      right shoulder   ? PELVIC LAPAROSCOPY      for endometriosis   ? MO LAP,ESOPHAGOGAST FUNDOPLASTY N/A 1/7/2016    Procedure: LAPAROSCOPIC HIATAL HERNIA REPAIR-TOUPE PROCEDURE.;  Surgeon: Shon Carroll MD;  Location: [facility];  Service: General   ? SPINE SURGERY     ? THORACOTOMY Right     for empyema   ? WRIST SURGERY Left     Family History   Problem Relation Age of Onset   ? Alzheimer's disease Mother    ? Other Father      cerebral hemorrhage    ? Ovarian cancer Sister 69   ? Breast cancer Daughter 50   ? No Medical Problems Brother    ? No Medical Problems Brother    ? No Medical Problems Daughter    ? BRCA 1/2 Neg Hx    ? Cancer Neg Hx    ? Colon cancer Neg Hx    ? Endometrial cancer Neg Hx     Social History     Social History   ? Marital status:      Spouse name: N/A   ? Number of children: N/A   ? Years of education: N/A      Occupational History   ?       Retired     Social History Main Topics   ? Smoking status: Former Smoker     Packs/day: 1.00     Years: 15.00     Types: Cigarettes     Quit date: 1/1/1998   ? Smokeless tobacco: Never Used   ? Alcohol use No   ? Drug use: No   ? Sexual activity: Not on file     Other Topics Concern   ? Not on file     Social History Narrative     (Richard)  2 children  Single level home  1/2017          Medications  Allergies   Current Outpatient Prescriptions   Medication Sig Dispense Refill   ? acetaminophen (TYLENOL) 500 MG tablet Take 500 mg by mouth every 6 (six) hours as needed for pain.     ? aspirin 81 mg chewable tablet Chew 1 tablet (81 mg total) daily.  0   ? atorvastatin (LIPITOR) 80 MG tablet Take 1 tablet (80 mg total) by mouth daily. 90 tablet 3   ? BENEFIBER, GUAR GUM, ORAL Take by mouth daily. 2 teaspoonfuls in 4-8 oz liquid     ? diphenhydrAMINE (BENADRYL) 25 mg tablet Take 25-50 mg by mouth at bedtime as needed for sleep.     ? diphenoxylate-atropine (LOMOTIL) 2.5-0.025 mg per tablet Take 1 tablet by mouth 2 (two) times a day as needed for diarrhea.     ? ferrous sulfate 325 (65 FE) MG tablet Take 1 tablet by mouth daily with breakfast.     ? gabapentin (NEURONTIN) 400 MG capsule Take 400 mg in the morning and 800 mg at bedtime (Patient taking differently: Take 800 mg by mouth at bedtime. Take 400 mg in the morning and 800 mg at bedtime) 90 capsule 2   ? Lactobacillus rhamnosus GG (CULTURELLE) 10-15 Billion cell capsule Take 1 capsule by mouth 2 (two) times a day. 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      ? loperamide (IMODIUM) 2 mg capsule Take 2 mg by mouth 4 (four) times a day as needed for diarrhea.     ? meclizine (ANTIVERT) 25 mg tablet Take 1 tablet (25 mg total) by mouth 2 (two) times a day as needed. 30 tablet 0   ? melatonin 10 mg Tab Take 10 mg by mouth at bedtime.     ? metoprolol succinate (TOPROL-XL) 50 MG 24 hr tablet Take 1 tablet (50 mg total)  by mouth daily. 180 tablet 3   ? multivitamin with minerals (THERA-M) 9 mg iron-400 mcg Tab tablet Take 1 tablet by mouth daily.     ? omeprazole (PRILOSEC) 20 MG capsule Take 20 mg by mouth 2 (two) times a day before meals.     ? oxyCODONE (ROXICODONE) 5 MG immediate release tablet Take 1 tablet (5 mg total) by mouth every 4 (four) hours as needed for pain. 107 tablet 0   ? ranitidine (ZANTAC) 150 MG tablet Take 150 mg by mouth 2 (two) times a day.     ? simethicone (GAS RELIEF) 125 mg cap capsule Take 125 mg by mouth every 6 (six) hours as needed for flatulence.     ? ticagrelor (BRILINTA) 90 mg Tab Take 1 tablet (90 mg total) by mouth 2 (two) times a day. 180 tablet 3   ? TiZANidine (ZANAFLEX) 4 MG capsule Take 1 capsule (4 mg total) by mouth 3 (three) times a day. 90 capsule 0   ? traZODone (DESYREL) 100 MG tablet Take 150 mg by mouth at bedtime.        No current facility-administered medications for this visit.       Allergies   Allergen Reactions   ? Amoxicillin Nausea Only     diarrhea   ? Ativan [Lorazepam]    ? Morphine Itching   ? Other Environmental Allergy      seasonal         Lab Results    Chemistry/lipid CBC Cardiac Enzymes/BNP/TSH/INR   Lab Results   Component Value Date    CHOL 100 04/27/2018    HDL 36 (L) 04/27/2018    LDLCALC 47 04/27/2018    TRIG 83 04/27/2018    CREATININE 0.52 (L) 04/21/2018    BUN 7 (L) 04/21/2018    K 3.8 04/22/2018     04/21/2018     (H) 04/21/2018    CO2 19 (L) 04/21/2018    Lab Results   Component Value Date    WBC 13.7 (H) 04/20/2018    HGB 10.5 (L) 04/22/2018    HCT 39.2 04/20/2018    MCV 93 04/20/2018     (H) 04/20/2018    Lab Results   Component Value Date    CKMB 79 (HH) 04/21/2018    TROPONINI 15.14 (HH) 04/21/2018     (H) 04/20/2018    TSH 1.10 08/17/2017    INR 1.02 04/20/2018

## 2021-06-26 NOTE — PROGRESS NOTES
Progress Notes by Millie Weaver CNP at 4/3/2018  8:10 AM     Author: Millie Weaver CNP Service: -- Author Type: Nurse Practitioner    Filed: 4/3/2018  9:12 AM Date of Service: 4/3/2018  8:10 AM Status: Signed    : Millie Weaver CNP (Nurse Practitioner)       PAIN CLINIC FOLLOW UP PROGRESS NOTE    CC:  Chief Complaint   Patient presents with   ? Chest Pain   ? Arm Pain       HPI  Minerva Blanco is a 73 y.o. female who presents for evaluation   Chief Complaint   Patient presents with   ? Chest Pain   ? Arm Pain    that is causing continued pain. Since the last visit the patient denies any trips to the urgent care or ED specifically for their pain. The patient denies any new medications, diagnoses since the last visit. Specific questions indicated the patient wanted addressed today include: patient presents for follow up after an increase in her pain medications and having her feeding tube removed, now is having residual leaking. Patient reports that she has had a little confusion at night intermittently. She reports that the tizanidine has helped her with sleep and the gabapentin has helped with her leg pain so she can sleep. She reports reducing her amount of benadryl, trazodone. Her goal is to get off of the medications all together and focus on her back pain and strengthening.       Major issues:  1. Chronic pain syndrome    2. Chronic intractable pain        Today the pain is located in their shoulder, low back pain and G tube site pain and is described as  when it is aggravated it lasts for constant, and is rated at a 6 on a scale of 1-10  Associated symptoms: Denies any loss of bladder control, fevers/chills, unintentional weight loss, weakness, numbness or pain that interferes with sleep.   Aggravating factors include: using her arm, nothing specifically   Alleviating factors: medications, laying down   Adverse effects of medications: NONE   Functional symptoms:F7, sleep has  gotten better with the use of gabapentin and tizanidine.  Current subjective treatment efficacy: Fair      Previous Medical History  History   Alcohol Use No     History   Drug Use No     History   Smoking Status   ? Former Smoker   ? Packs/day: 1.00   ? Years: 15.00   ? Types: Cigarettes   ? Quit date: 1/1/1998   Smokeless Tobacco   ? Never Used       Pertinent Pain Medications/interventions:  She currently takes up to 8 tablets of the oxycodone 10 mg tablets- your goal is to be off of the opioids by May 1st.      Review of Systems:  12 point systems were reviewed with pt as documented on pt health form and the patient denies any new diagnosis or changes in 12 point system review since the last visit.  g- tube removal and a leaking out of the insertion site.     Physical Exam  Vitals:    04/03/18 0820 04/03/18 0821   BP: (!) 87/55 (!) 89/64   Patient Site: Right Arm Left Arm   Patient Position: Sitting Sitting   Pulse: 91 86   Resp: 16    Weight: 102 lb 8 oz (46.5 kg)    Height: 5' (1.524 m)      General- patient is alert and oriented, in NAD, well-groomed, well-nourished  Psych- Judgment and insight normal, AOx4, recent and remote memory normal, mood and affect normal  Eyes- pupils are equal and reactive, conjunctiva is clear bilaterally, no ptosis is noted.   Respiratory- breathing is non-labored  Cardiovascular- extremities warm and well perfused, no peripheral edema or varicosities.  Musculoskeletal- gait is normal, extremities with no joint swelling, erythema, or warmth.  Neuro- normal strength, no gait abnormalities, normal sensation to pain, temperature, light touch.  Integumentary- no rashes, dermatitis or discolorations noted throughout, no open wounds noted.    Medications    Current Outpatient Prescriptions:   ?  acetaminophen (TYLENOL) 160 mg/5 mL (5 mL) Soln solution, 20.3 mL by G-tube route every 6 (six) hours., Disp: , Rfl:   ?  AMIODARONE HCL (AMIODARONE, BULK, MISC), 20 mL by G-tube route 2 (two)  times a day. 20 mg/ml   Give 20 ml bid for 2 weeks then qd  (Dose 400 mg), Disp: , Rfl:   ?  docusate sodium (COLACE) 50 mg/5 mL oral liquid, 100 mg by G-tube route 2 (two) times a day., Disp: , Rfl:   ?  gabapentin (NEURONTIN) 250 mg/5 mL solution, 400 mg by G-tube route every 8 (eight) hours., Disp: , Rfl:   ?  gabapentin (NEURONTIN) 400 MG capsule, Take 400 mg in the morning and 800 mg at bedtime, Disp: 90 capsule, Rfl: 2  ?  guar gum (NUTRISOURCE FIBER) Pack powder packet, 1 packet by G-tube route 2 (two) times a day., Disp: , Rfl:   ?  hydrOXYzine (VISTARIL) 25 MG capsule, Take 1 capsule (25 mg total) by mouth 3 (three) times a day as needed for itching., Disp: 20 capsule, Rfl: 0  ?  Lactobacillus rhamnosus GG (CULTURELLE) 10-15 Billion cell capsule, Take 1 capsule by mouth bedtime. 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, Disp: , Rfl:   ?  loperamide (IMODIUM) 1 mg/5 mL solution, Take 4 mg by mouth 4 (four) times a day as needed for diarrhea., Disp: , Rfl:   ?  Medical Cannabis, Use 1 Units As Directed. Take as instructed by the medical cannabis dispensary. The provider who enrolled the patient in the medical cannabis registry and certified this  patient will maintain ownership and liability of all provider reporting and registration requirements., Disp: , Rfl:   ?  melatonin 1 mg Tab tablet, 3 mg by G-tube route bedtime as needed., Disp: , Rfl:   ?  METOPROLOL SUCCINATE ORAL, 50 mg by G-tube route 2 (two) times a day. 10 mg/ml solution, Disp: , Rfl:   ?  multivit-mins-ferrous gluconat (CERTAVITE-ANTIOXID, IRON GLUC,) 9 mg iron/ 15 mL (15 mL) Liqd, Take 10 mL by mouth daily., Disp: , Rfl:   ?  omeprazole (PRILOSEC) 40 MG capsule, Take 20 mg by mouth 2 (two) times a day before meals., Disp: , Rfl:   ?  oxyCODONE (ROXICODONE) 10 mg immediate release tablet, Take 1 tablet (10 mg total) by mouth every 4 (four) hours as needed for pain (only take it as needed.)., Disp: 112 tablet, Rfl: 0  ?  polyethylene glycol (MIRALAX) 17  gram packet, 17 g by G-tube route daily., Disp: , Rfl:   ?  simethicone (MYLICON) 40 mg/0.6 mL drops, 40 mg by G-tube route every 6 (six) hours as needed (cramping)., Disp: , Rfl:   ?  TiZANidine (ZANAFLEX) 4 MG capsule, Take 1-2 capsules (4-8 mg total) by mouth at bedtime for 14 days., Disp: 28 capsule, Rfl: 0  ?  traZODone (DESYREL) 50 MG tablet, 100 mg by G-tube route bedtime. , Disp: , Rfl:   ?  zinc sulfate, bulk, Powd, 220 mg by G-tube route daily. 88mg/ml  (2.5 ml  Dose), Disp: , Rfl:     Lab:  Last UDS on 10/04/2017 was positive for the currently prescribed narcotics. Please see the scanned document from the outside lab. This was reviewed with the patient.      Imaging:  No new imaging.      Recent   Dated 4/3/2018 was reviewed with the patient today.       Assessment:   Minerva Blanco is a 73 y.o. female seen in clinic today for   Chief Complaint   Patient presents with   ? Chest Pain   ? Arm Pain     Patient reports that she has had a little confusion at night intermittently. She reports that the tizanidine has helped her with sleep and the gabapentin has helped with her leg pain so she can sleep. She reports reducing her amount of benadryl, trazodone. Her goal is to get off of the medications all together and focus on her back pain and strengthening.     Will reduce the amount of tizanidine at night to 2 mg instead of 4 mg, she is to cut the tab she has currently in half, patient is to continue the gabapentin as she is at night to help with the leg pain. Patient can also start weaning down on the oxycodone as scheduled when she is ready , this is all pending final closure of her G site that is not healing well and leaking after the G tube was removed. Patient is following up with surgery for this.     Patient knows to call with update as she goes, ask for a refill and or any withdrawal medications when needed.     They are here for follow up and continued medical management of their pain. Today we  reviewed the lab work of the UDT test and the results are expected because of the prescribed narcotics found in the urine drug screen.     I have also reviewed the information obtained from the last visit encounter after the REYES was signed and have asked any pertintent questions needed from other healthcare providers/family/patient to continue care and formulate a treatment plan.     Patients current MME is 90  Patient to call clinic for next month's prescription 5 days in advance. Based on the patients response to their previous narcotic therapy and quality of life, which includes their participation in ADLs, I recommend that the narcotics therapy continue, however the changes listed below will be incorporated into their plan of care.     Patient set goals to   1. To continue to control the pain while seeking permanent solutions to the continued issues at hand, GI tube site leaking.       Plan:   Interventions-     Follow up in 4 weeks to evaluate the effectiveness of the treatment interventions ordered today.     Continue the tizanidine ok to cut the tablet in half for a 2 mg dose to help with sleep.     Continue the gabapentin as you are taking 2 tablets at night as this is helping you at night time with your neuropathic pain.     Continue taking oxycodone 10 mg when you are ready to reduce the amount of medication to wean off of this I would reduce the oxycodone down by 1 tablet per day for 7 days (5 tablets) then reduce down by 1 more tablet per day for 7 days (4 tablets per day) then reduce down to 3 tablets per day for 7 days, continue to reduce the the amount by 1 tablet per day for 7 days until you are down to 1 tablet per day at that time we will reduce to 5 mg per day may have to cut the remaining in half or order 5 mg tablets.     Last refill was on 3/29/2018 if taking 6 per day this should last you until 4/16/2018- call for a refill     For any withdrawal symptoms let me know and we can order the  vistaril, zanaflex.     Therapy- PT/OT- as scheduled     Follow up with your general surgery in regards to your leaking GI site.     Prescription Drug Management will be continued by the [facility]  A narcotic contract was signed by the patient and  Patient is unable to get narcotics from other providers. They will be subject to random UAs.        UDT/SWAB:  Patient required a random Urine Drug Testing, due to the need to comply with [place] Model Policy Guidelines and CDC Guideline for the use of any controlled substances. This is to ensure that patient is compliant with treatment, and monitor for risks such as diversion, abuse, or any other aberrant behaviors. Patient is either being considered for or taking a controlled substance. Unexpected findings will be discussed and treatment decision may be adjusted. Testing is being implemented across the board randomly w/o bias related to age, race, gender, socioeconomic status or [employer] affiliation.    The patient understand todays plan and has their questions answered in regards to expectations and current treatment plan.     SAFETY REMINDERS  No alcohol while taking controlled substances. Alcohol is not an illegal substance, it is unsafe to use in combination. It is a build up of substances in the body that can be extremely hazardous and may cause respirations to slow to a dangerous rate resulting in hospitalization, brain damage, or death.    Opioid medications have been associated with sharp rise in unintentional overdose and death.  Overdose is a condition characterized by the consumption in excess of a particular drug causing adverse effects. This can happen b/c you are sick, accidentally or intentionally took an extra dose, are on multiple medication that can interact. Someone took your medication and they are not use to the medication.  Symptoms of overdose include:   !breathing slow and shallow, erratic or not at all  !pinpoint pupils,  hallucinations  !confusion  !muscle jerks, slack muscles   !extreme sleepiness or loss of alertness   !awake but not able to talk   !face pale or clammy, vomiting, for lighter skinned people, the skin tone turns bluish purple, for darker skinned people, it turns grayish or ashen   If in a situation where overdose is a concern engage the emergency response system (dial 911).    In one study it was noted that 80% of unintentional overdoses occurred in people who were taking a combination of opioids and benzodiazepines.    Do not sell, loan, borrow or share your opioid medication with anyone. Deaths have occurred as a result of this practice. It is illegal and patients are being prosecuted.     Prevent unexpected access/loss of medication: Keep medication locked. Only carry what you need with you.    Millie Weaver, [facility]  [address]  [place] [address]  Ph: [phone]  Fax: [phone]

## 2021-06-26 NOTE — PROGRESS NOTES
Progress Notes by Sheree Reynolds MD at 9/28/2018 10:10 AM     Author: Sheree Reynolds MD Service: -- Author Type: Physician    Filed: 9/28/2018  1:12 PM Encounter Date: 9/28/2018 Status: Signed    : Sheree Reyonlds MD (Physician)           Click to link to [facility] HEART CARE NOTE    Thank you, Dr. Nino, for asking us to see Minerva Blanco at the [facility].      Assessment/Recommendations   Assessment:    1.  Coronary artery disease: Status post inferior myocardial infarction.  Continue on dual antiplatelet therapy for 1 year.  Continue on daily aspirin indefinitely.  She has no anginal complaints today.  2.  Atrial fibrillation: Isolated episode of atrial fibrillation during surgery.  No recurrent episodes.  3.  Scheduled for cataract surgery and may undergo cataract surgery continuing on Brilinta and aspirin.  She is also wanting to undergo right shoulder replacement surgery and if possible would like to wait until she is completed 1 year of therapy with dual antiplatelet therapy.  Follow-up in 6 months.       History of Present Illness    Ms. Minerva Blanco is a 73 y.o. female with history of hypertension, dyslipidemia, extensive esophageal surgeries and recent inferior myocardial infarction status post PCI of proximal mid RCA who is here for follow up.  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 Wise with eventual resolution of her esophageal cutaneous fistula.    She continues to suffer from reflux with eating.  She had brief episodes of atrial fibrillation in the past and is now been off of Coumadin and amiodarone.  In April she presented with chest discomfort and nausea and was found to have 100% occluded RCA and underwent 2 stent placements. Echocardiogram showed left ventricular ejection  fraction of 50% with inferolateral wall motion abnormality and moderate mitral regurgitation as well as tricuspid valve regurgitation.  2 weeks ago she an episode of not feeling well with fatigue and increased shortness of breath however since then feeling better.  No complaints of increased edema.  No complaints of chest pain.  I was very sorry that her  passed away suddenly, Richard Blanco, just a month ago.        Echocardiogram 4/21/2018    Left Ventricle: Normal left ventricular size.The calculated left ventricular ejection fraction is 58%.    The following segments are hypokinetic: basal inferolateral and mid inferolateral.    Right Ventricle: Normal right ventricular size and systolic function.    Mitral Valve: Moderate mitral regurgitation likely relate to inferolateral wall hypokinesis resulting in tethering of posterior leaflet. The jet is posterior directed and is eccentric.    Moderate tricuspid valve regurgitation.  No previous study for comparison.     Coronary angiogram 4/20/2018    Estimated blood loss was <20 ml.    A STENT SYNERGY MR 2.75X28 drug eluting stent was successfully placed.    Prox RCA lesion 100% stenosed.    A STENT SYNERGY MR 2.75X24 drug eluting stent was successfully placed.    Mid RCA lesion 80% stenosed.    The left ventricular size is normal. The left ventricular systolic function is normal. LV systolic pressure is normal. LV end diastolic pressure is normal. There are wall motion abnormalities in the left ventricle.    The ejection fraction is 50-55% by visual estimate.       Recommendations   ? Patient given specific instructions regarding care of arteriotomy site, activity restrictions, signs and symptoms of cardiac or vascular complications and to seek immediate medical evaluation should they occur.  ? Arterial sheath removed from femoral artery with closure device.  ? Femoral angiogram identifies arterial sheath placement suitable for closure device.  ? Continuation  of dual antiplatelet therapy for 12 month(s).  ? Continue high dose statin therapy indefinitely.  ? Risk factor management for atherosclerosis.  ? Two stents placed in RCA with narrow gap at RV branch origin             Physical Examination Review of Systems   Vitals:    09/28/18 1004   BP: 114/68   Pulse: 64   Resp: 18     Body mass index is 17.77 kg/(m^2).  Wt Readings from Last 3 Encounters:   09/28/18 (!) 91 lb (41.3 kg)   07/25/18 (!) 96 lb (43.5 kg)   06/19/18 (!) 96 lb 6.4 oz (43.7 kg)       General Appearance:   alert, no apparent distress   HEENT:  no scleral icterus; the mucous membranes are pink and moist                                  Neck: jugular venous pressure normal, no thyromegaly   Chest: the spine is straight and the chest is symmetric   Lungs:   respirations unlabored; the lungs are clear to auscultation   Cardiovascular:   regular rhythm with normal first and second heart sounds and no murmurs or gallops   Abdomen:  no organomegaly, masses, bruits, or tenderness; bowel sounds are present   Extremities: no cyanosis, clubbing, or edema   Skin: no xanthelasma    General: WNL  Eyes: WNL  Ears/Nose/Throat: Nosebleeds  Lungs: Shortness of Breath, Cough (When patient would stand up)  Heart: Shortness of Breath with activity  Stomach: Heartburn  Bladder: WNL  Muscle/Joints: WNL  Skin: Poor Wound Healing  Nervous System: WNL  Mental Health: WNL     Blood: Easy Bleeding, Easy Bruising     Medical History  Surgical History Family History Social History   Past Medical History:   Diagnosis Date   ? Acute inferolateral myocardial infarction (H) 4/20/2018   ? Anemia    ? Arthritis    ? Breast cancer (H) 2007    right lumpectomy hx of breast cancer   ? Cough, persistent    ? Deaf, left    ? Esophagus perforation 01/22/2016   ? Fibromyalgia    ? GERD (gastroesophageal reflux disease)    ? Hiatal hernia    ? History of transfusion    ? HTN (hypertension) 5/2/2015   ? Hx antineoplastic chemotherapy 2007    hx  of right lumpectomy   ? Hx of radiation therapy 2007    right breast lumpectomy   ? Hyperlipidemia    ? IBS (irritable bowel syndrome)    ? Insomnia    ? Low back pain radiating to left leg     with tingling   ? Meniere disease    ? Multiple sclerosis (H)    ? Neuropathy (H)    ? 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 Paroxysmal atrial fibrillation (H)    ? Sinus tachycardia     Past Surgical History:   Procedure Laterality Date   ? APPENDECTOMY     ? BACK SURGERY     ? BREAST LUMPECTOMY Right 2007    hx of right lumpectomy   ? CATARACT EXTRACTION Right    ? CV CORONARY ANGIOGRAM N/A 4/20/2018    Procedure: Coronary Angiogram;  Surgeon: Hudson Atkinson MD;  Location: [facility] Cath Lab;  Service:    ? CV LEFT HEART CATHETERIZATION WITH LEFT VENTRICULOGRAM N/A 4/20/2018    Procedure: Left Heart Catheterization with Left Ventriculogram;  Surgeon: Hudson Atkinson MD;  Location: [facility] Cath Lab;  Service:    ? ESOPHAGECTOMY  01/09/2017    distal, with spit fistula creation   ? ESOPHAGOGASTRODUODENOSCOPY N/A 5/3/2015    Procedure: ESOPHAGOGASTRODUODENOSCOPY;  Surgeon: Rocky Kendall MD;  Location: [facility] GI;  Service:    ? EYE SURGERY     ? FRACTURE SURGERY     ? HERNIA REPAIR     ? PARTIAL GASTRECTOMY  01/09/2017    Proximal   ? partial resection distal clavicle, debridement, decompression      right shoulder   ? PELVIC LAPAROSCOPY      for endometriosis   ? AL LAP,ESOPHAGOGAST FUNDOPLASTY N/A 1/7/2016    Procedure: LAPAROSCOPIC HIATAL HERNIA REPAIR-TOREESEE PROCEDURE.;  Surgeon: Shon Carroll MD;  Location: [facility] OR;  Service: General   ? SPINE SURGERY     ? THORACOTOMY Right     for empyema   ? WRIST SURGERY Left     Family History   Problem Relation Age of Onset   ? Alzheimer's disease Mother    ? Other Father      cerebral hemorrhage    ? Ovarian cancer Sister 69   ? Breast cancer Daughter 50   ? No Medical Problems Brother    ? No Medical Problems Brother    ? No Medical Problems  Daughter    ? BRCA 1/2 Neg Hx    ? Cancer Neg Hx    ? Colon cancer Neg Hx    ? Endometrial cancer Neg Hx     Social History     Social History   ? Marital status:      Spouse name: N/A   ? Number of children: N/A   ? Years of education: N/A     Occupational History   ?       Retired     Social History Main Topics   ? Smoking status: Former Smoker     Packs/day: 1.00     Years: 15.00     Types: Cigarettes     Quit date: 1/1/1998   ? Smokeless tobacco: Never Used   ? Alcohol use No   ? Drug use: No   ? Sexual activity: Not on file     Other Topics Concern   ? Not on file     Social History Narrative     (Richard)  2 children  Single level home  1/2017          Medications  Allergies   Current Outpatient Prescriptions   Medication Sig Dispense Refill   ? acetaminophen (TYLENOL) 500 MG tablet Take 500 mg by mouth every 6 (six) hours as needed for pain.     ? aspirin 81 mg chewable tablet Chew 1 tablet (81 mg total) daily.  0   ? atorvastatin (LIPITOR) 80 MG tablet Take 1 tablet (80 mg total) by mouth daily. 90 tablet 3   ? BENEFIBER, GUAR GUM, ORAL Take by mouth daily. 2 teaspoonfuls in 4-8 oz liquid     ? diphenhydrAMINE (BENADRYL) 25 mg tablet Take 25-50 mg by mouth at bedtime as needed for sleep.     ? diphenoxylate-atropine (LOMOTIL) 2.5-0.025 mg per tablet Take 1 tablet by mouth 2 (two) times a day as needed for diarrhea.     ? ferrous sulfate 325 (65 FE) MG tablet Take 1 tablet by mouth daily with breakfast.     ? gabapentin (NEURONTIN) 400 MG capsule Take 400 mg in the morning and 800 mg at bedtime 90 capsule 2   ? Lactobacillus rhamnosus GG (CULTURELLE) 10-15 Billion cell capsule Take 1 capsule by mouth 2 (two) times a day. 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      ? loperamide (IMODIUM) 2 mg capsule Take 2 mg by mouth 4 (four) times a day as needed for diarrhea.     ? melatonin 10 mg Tab Take 10 mg by mouth at bedtime.     ? metoprolol succinate (TOPROL-XL) 50 MG 24 hr tablet Take 1  tablet (50 mg total) by mouth daily. 180 tablet 3   ? multivitamin with minerals (THERA-M) 9 mg iron-400 mcg Tab tablet Take 1 tablet by mouth daily.     ? omeprazole (PRILOSEC) 20 MG capsule Take 20 mg by mouth 2 (two) times a day before meals.     ? ranitidine (ZANTAC) 150 MG tablet Take 150 mg by mouth 2 (two) times a day.     ? simethicone (GAS RELIEF) 125 mg cap capsule Take 125 mg by mouth every 6 (six) hours as needed for flatulence.     ? ticagrelor (BRILINTA) 90 mg Tab Take 1 tablet (90 mg total) by mouth 2 (two) times a day. 180 tablet 3   ? TiZANidine (ZANAFLEX) 4 MG capsule 1-2 at bedtime 60 capsule 0   ? traMADol (ULTRAM) 50 mg tablet Take 1 tablet (50 mg total) by mouth every 8 (eight) hours as needed for pain. 120 tablet 0   ? traZODone (DESYREL) 100 MG tablet Take 150 mg by mouth at bedtime.        No current facility-administered medications for this visit.       Allergies   Allergen Reactions   ? Amoxicillin Nausea Only     diarrhea   ? Ativan [Lorazepam] Other (See Comments)     Hallucinations   ? Morphine Itching   ? Other Environmental Allergy      seasonal         Lab Results    Chemistry/lipid CBC Cardiac Enzymes/BNP/TSH/INR   Lab Results   Component Value Date    CHOL 100 04/27/2018    HDL 36 (L) 04/27/2018    LDLCALC 47 04/27/2018    TRIG 83 04/27/2018    CREATININE 0.52 (L) 04/21/2018    BUN 7 (L) 04/21/2018    K 3.8 04/22/2018     04/21/2018     (H) 04/21/2018    CO2 19 (L) 04/21/2018    Lab Results   Component Value Date    WBC 13.7 (H) 04/20/2018    HGB 10.5 (L) 04/22/2018    HCT 39.2 04/20/2018    MCV 93 04/20/2018     (H) 04/20/2018    Lab Results   Component Value Date    CKMB 79 (HH) 04/21/2018    TROPONINI 15.14 (HH) 04/21/2018     (H) 04/20/2018    TSH 1.10 08/17/2017    INR 1.02 04/20/2018

## 2021-06-26 NOTE — PROGRESS NOTES
Progress Notes by Liberty Lagos CNP at 5/10/2018  1:30 PM     Author: Liberty Lagos CNP Service: -- Author Type: Nurse Practitioner    Filed: 5/10/2018  2:15 PM Encounter Date: 5/10/2018 Status: Signed    : Liberty Lagos CNP (Nurse Practitioner)                 Click to link to [facility] HEART [facility] NOTE      Assessment/Recommendations   1.  Coronary artery disease: She was hospitalized April 20 - April 22 with a STEMI.  She received 2 drug-eluting stents to proximal and mid RCA.  Dual antiplatelet therapy is being used with aspirin indefinitely and ticagrelor for 1 year.   We discussed the importance of antiplatelet therapy and talking with her cardiologist prior to stopping these medications for any reason.     Risk factor modification and lifestyle management topics were discussed including managing comorbidities, heart healthy diet and exercise.  She will begin cardiac rehab next week.    2.  Dyslipidemia: Minerva Blanco is on high intensity statin therapy with atorvastatin 80 mg daily.  We discussed a diet low in saturated fat, weight loss, and exercise along with medication for better control of cholesterol.  Her diet is limited due to previous gastric surgery.  She is working with a dietitian.    3.  Hypertension: Her blood pressure is well controlled today taking metoprolol.    Minerva will follow up with Dr. Reynolds on June 6.     History of Present Illness    Minerva Blanco is seen at [facility] for post coronary intervention follow up.  She has a history of hypertension, gastric surgery with esophageal perforation, breast cancer, chronic pain, and fibromyalgia.  She was hospitalized April 20 - April 22 with a STEMI.  She received 2 drug-eluting stents to proximal and mid RCA.  Dual antiplatelet therapy is being used with aspirin indefinitely and ticagrelor for 1 year.  Echocardiogram on April 21, 2018 showed ejection fraction  of 58%, moderate mitral regurgitation, and moderate tricuspid regurgitation.    She continues to have fatigue and shortness of breath but has noticed improvement since PCI.  She initially had lower extremity edema after discharge but this has resolved.  She denies lightheadedness and chest pain.      She notes a decreased appetite and has lost about 5 pounds since MI.  She was on long-term feeding tube which was discontinued in March 2018.  She had slowly been reintroducing solid foods. She spoke with her dietitian this week and will follow-up again next week.     Physical Examination Review of Systems   Vitals:    05/10/18 1337   BP: 106/70   Pulse: 76   Resp: 16     Body mass index is 18.77 kg/(m^2).  Wt Readings from Last 3 Encounters:   05/10/18 (!) 96 lb 1.6 oz (43.6 kg)   04/30/18 101 lb (45.8 kg)   04/22/18 101 lb 1.6 oz (45.9 kg)       General Appearance:     Alert, cooperative and in no acute distress.   ENT/Mouth: membranes moist, no oral lesions or bleeding gums.      EYES:  no scleral icterus, normal conjunctivae   Chest/Lungs:   lungs are clear to auscultation, no rales or wheezing, respirations unlabored   Cardiovascular:   Regular. Normal first and second heart sounds with no murmurs, rubs, or gallops; the radial and posterior tibial pulses are intact, no edema bilateral lower extremities    Abdomen:  Soft, nontender, nondistended, bowel sounds present   Extremities: no cyanosis or clubbing   Skin:  Neurologic: warm, dry.  mood and affect are appropriate, alert and oriented x3     Puncture Site:  She declines assessment of right femoral puncture site but states healing with no bruising or pain.  Radial pulses and Pedal pulses intact and symmetrical.  CMS intact.      General: Weight Loss  Eyes: WNL  Ears/Nose/Throat: WNL  Lungs: Shortness of Breath  Heart: Shortness of Breath with activity  Stomach: WNL  Bladder: WNL  Muscle/Joints: WNL  Skin: WNL  Nervous System: WNL  Mental Health: WNL     Blood:  WNL     Medical History  Surgical History Family History Social History   Past Medical History:   Diagnosis Date   ? Acute inferolateral myocardial infarction 4/20/2018   ? Anemia    ? Arthritis    ? Breast cancer 2007    right lumpectomy hx of breast cancer   ? Cough, persistent    ? Deaf, left    ? Esophagus perforation 01/22/2016   ? Fibromyalgia    ? GERD (gastroesophageal reflux disease)    ? Hiatal hernia    ? History of transfusion    ? HTN (hypertension) 5/2/2015   ? Hx antineoplastic chemotherapy 2007    hx of right lumpectomy   ? Hx of radiation therapy 2007    right breast lumpectomy   ? Hyperlipidemia    ? IBS (irritable bowel syndrome)    ? Insomnia    ? Low back pain radiating to left leg     with tingling   ? Meniere disease    ? Multiple sclerosis    ? Neuropathy    ? 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 Paroxysmal atrial fibrillation    ? Sinus tachycardia     Past Surgical History:   Procedure Laterality Date   ? APPENDECTOMY     ? BACK SURGERY     ? BREAST LUMPECTOMY Right 2007    hx of right lumpectomy   ? CATARACT EXTRACTION Right    ? CV CORONARY ANGIOGRAM N/A 4/20/2018    Procedure: Coronary Angiogram;  Surgeon: Hudson Atkinson MD;  Location: [facility] Cath Lab;  Service:    ? CV LEFT HEART CATHETERIZATION WITH LEFT VENTRICULOGRAM N/A 4/20/2018    Procedure: Left Heart Catheterization with Left Ventriculogram;  Surgeon: Hudson Atkinson MD;  Location: [facility] Cath Lab;  Service:    ? ESOPHAGECTOMY  01/09/2017    distal, with spit fistula creation   ? ESOPHAGOGASTRODUODENOSCOPY N/A 5/3/2015    Procedure: ESOPHAGOGASTRODUODENOSCOPY;  Surgeon: Rocky Kendall MD;  Location: [facility];  Service:    ? EYE SURGERY     ? FRACTURE SURGERY     ? HERNIA REPAIR     ? PARTIAL GASTRECTOMY  01/09/2017    Proximal   ? partial resection distal clavicle, debridement, decompression      right shoulder   ? PELVIC LAPAROSCOPY      for endometriosis   ? [place] LAP,ESOPHAGOGAST FUNDOPLASTY N/A  1/7/2016    Procedure: LAPAROSCOPIC HIATAL HERNIA REPAIR-TOUPE PROCEDURE.;  Surgeon: Shon Carroll MD;  Location: [facility] OR;  Service: General   ? SPINE SURGERY     ? THORACOTOMY Right     for empyema   ? WRIST SURGERY Left     Family History   Problem Relation Age of Onset   ? Alzheimer's disease Mother    ? Other Father      cerebral hemorrhage    ? Ovarian cancer Sister 69   ? Breast cancer Daughter 50   ? No Medical Problems Brother    ? No Medical Problems Brother    ? No Medical Problems Daughter    ? BRCA 1/2 Neg Hx    ? Cancer Neg Hx    ? Colon cancer Neg Hx    ? Endometrial cancer Neg Hx     Social History     Social History   ? Marital status:      Spouse name: N/A   ? Number of children: N/A   ? Years of education: N/A     Occupational History   ?       Retired     Social History Main Topics   ? Smoking status: Former Smoker     Packs/day: 1.00     Years: 15.00     Types: Cigarettes     Quit date: 1/1/1998   ? Smokeless tobacco: Never Used   ? Alcohol use No   ? Drug use: No   ? Sexual activity: Not on file     Other Topics Concern   ? Not on file     Social History Narrative     (Richard)  2 children  Single level home  1/2017          Medications  Allergies   Current Outpatient Prescriptions   Medication Sig Dispense Refill   ? acetaminophen (TYLENOL) 500 MG tablet Take 500 mg by mouth every 6 (six) hours as needed for pain.     ? aspirin 81 mg chewable tablet Chew 1 tablet (81 mg total) daily.  0   ? atorvastatin (LIPITOR) 80 MG tablet Take 1 tablet (80 mg total) by mouth daily. 90 tablet 3   ? BENEFIBER, GUAR GUM, ORAL Take by mouth daily. 2 teaspoonfuls in 4-8 oz liquid     ? diphenhydrAMINE (BENADRYL) 25 mg tablet Take 25-50 mg by mouth at bedtime as needed for sleep.     ? diphenoxylate-atropine (LOMOTIL) 2.5-0.025 mg per tablet Take 1 tablet by mouth 2 (two) times a day as needed for diarrhea.     ? ferrous sulfate 325 (65 FE) MG tablet Take 1 tablet by  mouth daily with breakfast.     ? gabapentin (NEURONTIN) 400 MG capsule Take 400 mg in the morning and 800 mg at bedtime (Patient taking differently: Take 800 mg by mouth at bedtime. Take 400 mg in the morning and 800 mg at bedtime) 90 capsule 2   ? Lactobacillus rhamnosus GG (CULTURELLE) 10-15 Billion cell capsule Take 1 capsule by mouth 2 (two) times a day. 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      ? loperamide (IMODIUM) 2 mg capsule Take 2 mg by mouth 4 (four) times a day as needed for diarrhea.     ? melatonin 10 mg Tab Take 10 mg by mouth at bedtime.     ? metoprolol succinate (TOPROL-XL) 50 MG 24 hr tablet Take 1 tablet (50 mg total) by mouth 2 (two) times a day. 180 tablet 3   ? multivitamin with minerals (THERA-M) 9 mg iron-400 mcg Tab tablet Take 1 tablet by mouth daily.     ? omeprazole (PRILOSEC) 20 MG capsule Take 20 mg by mouth 2 (two) times a day before meals.     ? oxyCODONE (ROXICODONE) 10 mg immediate release tablet Take 1 tablet (10 mg total) by mouth 5 x daily PRN for pain (only take it as needed.). 119 tablet 0   ? simethicone (GAS RELIEF) 125 mg cap capsule Take 125 mg by mouth every 6 (six) hours as needed for flatulence.     ? ticagrelor (BRILINTA) 90 mg Tab Take 1 tablet (90 mg total) by mouth 2 (two) times a day. 180 tablet 3   ? tiZANidine (ZANAFLEX) 4 MG tablet Take 1-2 po at HS prn 28 tablet 0   ? traZODone (DESYREL) 100 MG tablet Take 150 mg by mouth at bedtime.        No current facility-administered medications for this visit.       Allergies   Allergen Reactions   ? Amoxicillin Nausea Only     diarrhea   ? Ativan [Lorazepam]    ? Morphine Itching   ? Other Environmental Allergy      seasonal         Lab Results    Chemistry CBC BNP   Lab Results   Component Value Date    CREATININE 0.52 (L) 04/21/2018    BUN 7 (L) 04/21/2018     04/21/2018    K 3.8 04/22/2018     (H) 04/21/2018    CO2 19 (L) 04/21/2018     Creatinine (mg/dL)   Date Value   04/21/2018 0.52 (L)   04/20/2018 0.68    04/20/2018 0.64   09/29/2017 0.55 (L)    Lab Results   Component Value Date    WBC 13.7 (H) 04/20/2018    HGB 10.5 (L) 04/22/2018    HCT 39.2 04/20/2018    MCV 93 04/20/2018     (H) 04/20/2018    Lab Results   Component Value Date     (H) 04/20/2018     BNP (pg/mL)   Date Value   04/20/2018 295 (H)          25 minutes were spent with the patient with greater than 50% spent on education and counseling.      Liberty Lagos, [facility]

## 2021-06-26 NOTE — PROGRESS NOTES
Progress Notes by Millie Weaver CNP at 3/20/2018  9:11 AM     Author: Millie Weaver CNP Service: -- Author Type: Nurse Practitioner    Filed: 3/20/2018 12:47 PM Date of Service: 3/20/2018  9:11 AM Status: Signed    : Millie Weaver CNP (Nurse Practitioner)       PAIN CLINIC FOLLOW UP PROGRESS NOTE    CC:  Chief Complaint   Patient presents with   ? Arm Pain   ? Chest Pain       HPI  Minerva Blanco is a 73 y.o. female who presents for evaluation   Chief Complaint   Patient presents with   ? Arm Pain   ? Chest Pain    that is causing continued pain. Since the last visit the patient denies any trips to the urgent care or ED specifically for their pain. The patient denies any new medications, diagnoses since the last visit. Specific questions indicated the patient wanted addressed today include: follow up with provider with chronic pain plan as well as update on the open chest wound, she has night sweats, as well as her status on eating.     Major issues:  1. Chronic pain syndrome    2. Chronic intractable pain      Today the pain is located in their left u quadrant abdominal pain and is described as sharp and stabbing when it is aggravated it lasts for constant and is rated at a 7 on a scale of 1-10  Associated symptoms: Denies any loss of bladder control, fevers/chills, unintentional weight loss, weakness, numbness or pain that interferes with sleep.   Aggravating factors include: G-tube manipulation  Alleviating factors: Ice packs  Adverse effects of medications: NONE   Functional symptoms: F8  Current subjective treatment efficacy: Fair      Previous Medical History  History   Alcohol Use No     History   Drug Use No     History   Smoking Status   ? Former Smoker   ? Packs/day: 1.00   ? Years: 15.00   ? Types: Cigarettes   ? Quit date: 1/1/1998   Smokeless Tobacco   ? Never Used       Pertinent Pain Medications/interventions:  She currently takes oxycodone 10 mg  6 per day  sometimes 7-8 per day depending on the day due to the tube feeding     Review of Systems:  12 point systems were reviewed with pt as documented on pt health form and the patient denies any new diagnosis or changes in 12 point system review since the last visit.     Physical Exam  Vitals:    03/20/18 0923   BP: 101/55   Patient Site: Right Arm   Patient Position: Sitting   Cuff Size: Adult Regular   Pulse: 74   Resp: 16   Weight: 103 lb (46.7 kg)   Height: 5' (1.524 m)     General- patient is alert and oriented, in NAD, well-groomed, well-nourished  Psych- Judgment and insight normal, AOx4, recent and remote memory normal, mood and affect normal  Eyes- pupils are equal and reactive, conjunctiva is clear bilaterally, no ptosis is noted.   Respiratory- breathing is non-labored  Cardiovascular- extremities warm and well perfused, no peripheral edema or varicosities.  Musculoskeletal- gait is normal, extremities with no joint swelling, erythema, or warmth.  Neuro- normal strength, no gait abnormalities, normal sensation to pain, temperature, light touch.  Integumentary- no rashes, dermatitis or discolorations noted throughout, no open wounds noted.    Medications    Current Outpatient Prescriptions:   ?  acetaminophen (TYLENOL) 160 mg/5 mL (5 mL) Soln solution, 20.3 mL by G-tube route every 6 (six) hours., Disp: , Rfl:   ?  AMIODARONE HCL (AMIODARONE, BULK, MISC), 20 mL by G-tube route 2 (two) times a day. 20 mg/ml   Give 20 ml bid for 2 weeks then qd  (Dose 400 mg), Disp: , Rfl:   ?  docusate sodium (COLACE) 50 mg/5 mL oral liquid, 100 mg by G-tube route 2 (two) times a day., Disp: , Rfl:   ?  gabapentin (NEURONTIN) 250 mg/5 mL solution, 400 mg by G-tube route every 8 (eight) hours., Disp: , Rfl:   ?  guar gum (NUTRISOURCE FIBER) Pack powder packet, 1 packet by G-tube route 2 (two) times a day., Disp: , Rfl:   ?  hydrOXYzine (VISTARIL) 25 MG capsule, Take 1 capsule (25 mg total) by mouth 3 (three) times a day as needed  for itching., Disp: 20 capsule, Rfl: 0  ?  Lactobacillus rhamnosus GG (CULTURELLE) 10-15 Billion cell capsule, Take 1 capsule by mouth bedtime. 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, Disp: , Rfl:   ?  loperamide (IMODIUM) 1 mg/5 mL solution, Take 4 mg by mouth 4 (four) times a day as needed for diarrhea., Disp: , Rfl:   ?  Medical Cannabis, Use 1 Units As Directed. Take as instructed by the medical cannabis dispensary. The provider who enrolled the patient in the medical cannabis registry and certified this  patient will maintain ownership and liability of all provider reporting and registration requirements., Disp: , Rfl:   ?  melatonin 1 mg Tab tablet, 3 mg by G-tube route bedtime as needed., Disp: , Rfl:   ?  METOPROLOL SUCCINATE ORAL, 50 mg by G-tube route 2 (two) times a day. 10 mg/ml solution, Disp: , Rfl:   ?  multivit-mins-ferrous gluconat (CERTAVITE-ANTIOXID, IRON GLUC,) 9 mg iron/ 15 mL (15 mL) Liqd, Take 10 mL by mouth daily., Disp: , Rfl:   ?  omeprazole (PRILOSEC) 40 MG capsule, Take 20 mg by mouth 2 (two) times a day before meals., Disp: , Rfl:   ?  oxyCODONE (ROXICODONE) 10 mg immediate release tablet, Take 1 tablet (10 mg total) by mouth every 4 (four) hours as needed for pain (only take it as needed.)., Disp: 180 tablet, Rfl: 0  ?  polyethylene glycol (MIRALAX) 17 gram packet, 17 g by G-tube route daily., Disp: , Rfl:   ?  simethicone (MYLICON) 40 mg/0.6 mL drops, 40 mg by G-tube route every 6 (six) hours as needed (cramping)., Disp: , Rfl:   ?  TiZANidine (ZANAFLEX) 4 MG capsule, Take 1 capsule (4 mg total) by mouth 3 (three) times a day., Disp: 20 capsule, Rfl: 0  ?  traZODone (DESYREL) 50 MG tablet, 100 mg by G-tube route bedtime. , Disp: , Rfl:   ?  zinc sulfate, bulk, Powd, 220 mg by G-tube route daily. 88mg/ml  (2.5 ml  Dose), Disp: , Rfl:     Lab:  Last UDS on 1/25/2018 was positive for the currently prescribed narcotics. Please see the scanned document from the outside lab. This was reviewed with  the patient.      Imaging:  No new imaging.      Recent   Dated 3/20/2018 was reviewed with the patient today.       Assessment:   Minerva Blanco is a 73 y.o. female seen in clinic today for   Chief Complaint   Patient presents with   ? Arm Pain   ? Chest Pain     She presents the office today to follow-up her pain clinic plan.    Currently she is taking oxycodone 10 mg tablets up to 6 times a day, patient does have an open thoracic wound that has been slowly healing over the past year as well as a stent that was recently removed by GI at the [facility].  Patient states that the stent removal her chest pain improved significantly.  However now she has G-tube pain which has been ongoing it is now more localized, the update per the [facility] as the patient is able to take food by mouth at this time they are monitoring her for a few weeks she is to follow-up with them next week for consideration of the G-tube removal, after that patient follow-up with the pain center to reevaluate her need for pain medications, patient is also looking to wean down off of her opioids by May 1, 2018.    They are here for follow up and continued medical management of their pain. Today we reviewed the lab work of the UDT test and the results are expected because of the prescribed narcotics found in the urine drug screen.     I have also reviewed the information obtained from the last visit encounter after the REYES was signed and have asked any pertintent questions needed from other healthcare providers/family/patient to continue care and formulate a treatment plan.     Patients current MME is 90  Patient to call clinic for next month's prescription 5 days in advance. Based on the patients response to their previous narcotic therapy and quality of life, which includes their participation in ADLs, I recommend that the narcotics therapy continue, however the changes listed below will be incorporated into their  plan of care.     Patient set goals to   1.  Wean off of opioids after her G-tube is removed    Plan:   Interventions-       Follow up in 2 weeks to evaluate the effectiveness of the treatment interventions ordered today. Follow up on the GI appointment and the pain level with the G tube out.    Ok to take melatonin 5-10 mg at bedtime   Set your alarm clock for the same time every day and force yourself to get up to get your internal clock back on track      some chelated and unchelated magnesium for your leg cramps    Ok to take up to 8 tablets of the oxycodone 10 mg tablets- your goal is to be off of the opioids by May 1st, will help you with this process once you are ready, pending the feeding tube removal     Call if the prescription does not get approved by the insurance company     ok to take the tizanidine at bedtime     Started the gabapentin again     Prescription Drug Management will be continued by the [facility]  A narcotic contract was signed by the patient and  Patient is unable to get narcotics from other providers. They will be subject to random UAs.      Orders placed today  Medications that were ordered today   Requested Prescriptions     Pending Prescriptions Disp Refills   ? oxyCODONE (ROXICODONE) 10 mg immediate release tablet 180 tablet 0     Sig: Take 1 tablet (10 mg total) by mouth every 4 (four) hours as needed for pain (only take it as needed.).   ? gabapentin (NEURONTIN) 250 mg/5 mL solution  0     Si mL (400 mg total) by G-tube route every 8 (eight) hours.     No orders of the defined types were placed in this encounter.      UDT/SWAB:  Patient required a random Urine Drug Testing, due to the need to comply with [place] Model Policy Guidelines and CDC Guideline for the use of any controlled substances. This is to ensure that patient is compliant with treatment, and monitor for risks such as diversion, abuse, or any other aberrant behaviors. Patient is either  being considered for or taking a controlled substance. Unexpected findings will be discussed and treatment decision may be adjusted. Testing is being implemented across the board randomly w/o bias related to age, race, gender, socioeconomic status or Jew affiliation.    The patient understand todays plan and has their questions answered in regards to expectations and current treatment plan.     SAFETY REMINDERS  No alcohol while taking controlled substances. Alcohol is not an illegal substance, it is unsafe to use in combination. It is a build up of substances in the body that can be extremely hazardous and may cause respirations to slow to a dangerous rate resulting in hospitalization, brain damage, or death.    Opioid medications have been associated with sharp rise in unintentional overdose and death.  Overdose is a condition characterized by the consumption in excess of a particular drug causing adverse effects. This can happen b/c you are sick, accidentally or intentionally took an extra dose, are on multiple medication that can interact. Someone took your medication and they are not use to the medication.  Symptoms of overdose include:   !breathing slow and shallow, erratic or not at all  !pinpoint pupils, hallucinations  !confusion  !muscle jerks, slack muscles   !extreme sleepiness or loss of alertness   !awake but not able to talk   !face pale or clammy, vomiting, for lighter skinned people, the skin tone turns bluish purple, for darker skinned people, it turns grayish or ashen   If in a situation where overdose is a concern engage the emergency response system (dial 911).    In one study it was noted that 80% of unintentional overdoses occurred in people who were taking a combination of opioids and benzodiazepines.    Do not sell, loan, borrow or share your opioid medication with anyone. Deaths have occurred as a result of this practice. It is illegal and patients are being prosecuted.     Prevent  unexpected access/loss of medication: Keep medication locked. Only carry what you need with you.    Millie Weaver, [facility]  [address]  [place] [address]  Ph: [phone]  Fax: [phone]

## 2021-06-27 NOTE — PROGRESS NOTES
Progress Notes by Walter Cote DO at 6/5/2019  9:30 AM     Author: Walter Cote DO Service: -- Author Type: Physician    Filed: 6/5/2019 12:46 PM Encounter Date: 6/5/2019 Status: Signed    : Walter Cote DO (Physician)           Click to link to [facility] Heart Care     [facility] HEART CARE NOTE      Assessment/Recommendations   Assessment:    1. Acute congestive heart failure possible right heart failure from gross volume overload.  Severe volume overload after surgery.  She is up 30 pounds from her baseline of 86 pounds. BNP:>4,000.   2.  Coronary artery disease status post percutaneous coronary intervention   3.  Hypokalemia.   4.  Recent upper ext DVT    Plan:  1.  Given gross volume overload, hypotension, history of hyperkalemia I feel she should be admitted for further evaluation.  2.  Needs significant diuresis.  Is unclear while she was not seen by heart failure specialist at [facility] before discharge given she had 30 pound weight gain during that admission.  3.  Recommend inpatient hospitalization to hypovolemia is improved.  4.  Inpatient echocardiogram  5.  Case with inpatient hospitalist.        History of Present Illness    Ms. Minerva Blanco is a 74 y.o. female with coronary artery disease who presents in cardiology rapid access clinic for severe lower extremity edema associated with marked elevation in BNP level.    Patient has a history of coronary artery disease status post coronary intervention of proximal to mid RCA in 2018.  Echocardiogram after her event demonstrated normal left ventricular function.  She had mild elevation in her pulmonary pressures.    She had complex spine surgery at [facility] in early May.  Her admitting weight was 85 pounds and she was discharged at a weight of over 100 pounds.  She had 2 surgeries during her hospitalization.  She was not evaluated by cardiology during her hospitalization.  Her Brilinta was  discontinued prior to the case.  Patient states she did receive fluid during her surgeries.  She also received blood transfusions during her hospitalization.  At discharge she had weeping edema and was quite concerned by this finding.  She was discharged on no diuretic therapy.  Was diagnosed with lymphedema which seems unlikely given the acuity of her issues.        Was started on diuretics at subacute rehab.  NP level demonstrated marked elevation of over 4000.  She is referred to our rapid access clinic for further evaluation of acute heart failure.  The patient has pitting edema to her mid thigh.  She has no significant dyspnea sitting upright or at rest.  Symptom appear to be all right heart failure in nature.       Physical Examination Review of Systems   Vitals:    06/05/19 0938   BP: (!) 87/40   Pulse: 84   Resp: 18     Body mass index is 20.51 kg/m .  Wt Readings from Last 3 Encounters:   06/05/19 107 lb (48.5 kg)   06/05/19 105 lb (47.6 kg)   04/09/19 (!) 86 lb (39 kg)       General Appearance:   no distress, normal body habitus, being in wheelchair   ENT/Mouth: membranes moist, no oral lesions or bleeding gums.      EYES:  no scleral icterus, normal conjunctivae   Neck: no carotid bruits or thyromegaly   Chest/Lungs:    Crackle on right base rales no wheezing, prior scar from esophageal surgery, equal chest wall expansion    Cardiovascular:   Regular. Normal first and second heart sounds with no murmurs, rubs, or gallops; the carotid, radial and posterior tibial pulses are intact, Jugular venous pressure 8-10 cm, severe pitting edema bilaterally    Abdomen:  no organomegaly, masses, bruits, or tenderness; bowel sounds are present   Extremities: no cyanosis or clubbing   Skin: no xanthelasma, warm.    Neurologic: normal gait, normal  bilateral, no tremors     Psychiatric: alert and oriented x3, calm     General: Weight Gain  Eyes: WNL  Ears/Nose/Throat: WNL  Lungs: Cough  Heart: Shortness of Breath  with activity, Leg Swelling  Stomach: Constipation, Diarrhea, Heartburn  Bladder: Frequent Urination at Night  Muscle/Joints: Muscle Weakness, Muscle Pain  Skin: Poor Wound Healing  Nervous System: Falls, Loss of Balance  Mental Health: WNL     Blood: Easy Bleeding, Easy Bruising     Medical History  Surgical History Family History Social History   Past Medical History:   Diagnosis Date   ? Acute inferolateral myocardial infarction (H) 4/20/2018   ? Anemia    ? Arthritis    ? Breast cancer (H) 2007    right lumpectomy hx of breast cancer   ? Cough, persistent    ? Deaf, left    ? Esophagus perforation 01/22/2016   ? Fibromyalgia    ? GERD (gastroesophageal reflux disease)    ? Hiatal hernia    ? History of transfusion    ? HTN (hypertension) 5/2/2015   ? Hx antineoplastic chemotherapy 2007    hx of right lumpectomy   ? Hx of radiation therapy 2007    right breast lumpectomy   ? Hyperlipidemia    ? IBS (irritable bowel syndrome)    ? Insomnia    ? Low back pain radiating to left leg     with tingling   ? Meniere disease    ? Multiple sclerosis (H)    ? Neuropathy (H)    ? 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 Paroxysmal atrial fibrillation (H)    ? Sinus tachycardia     Past Surgical History:   Procedure Laterality Date   ? APPENDECTOMY     ? BACK SURGERY     ? BREAST LUMPECTOMY Right 2007    hx of right lumpectomy   ? CATARACT EXTRACTION Right    ? CV CORONARY ANGIOGRAM N/A 4/20/2018    Procedure: Coronary Angiogram;  Surgeon: Hudson Atkinson MD;  Location: [facility] Cath Lab;  Service:    ? CV LEFT HEART CATHETERIZATION WITH LEFT VENTRICULOGRAM N/A 4/20/2018    Procedure: Left Heart Catheterization with Left Ventriculogram;  Surgeon: Hudson Atkinson MD;  Location: [facility] Cath Lab;  Service:    ? ESOPHAGECTOMY  01/09/2017    distal, with spit fistula creation   ? ESOPHAGOGASTRODUODENOSCOPY N/A 5/3/2015    Procedure: ESOPHAGOGASTRODUODENOSCOPY;  Surgeon: Rocky Kendall MD;  Location: [facility];   Service:    ? EYE SURGERY     ? FRACTURE SURGERY     ? HERNIA REPAIR     ? PARTIAL GASTRECTOMY  2017    Proximal   ? partial resection distal clavicle, debridement, decompression      right shoulder   ? PELVIC LAPAROSCOPY      for endometriosis   ? CO LAP,ESOPHAGOGAST FUNDOPLASTY N/A 2016    Procedure: LAPAROSCOPIC HIATAL HERNIA REPAIR-TOUPE PROCEDURE.;  Surgeon: Shon Carroll MD;  Location: [facility] OR;  Service: General   ? SPINE SURGERY     ? THORACOTOMY Right     for empyema   ? WRIST SURGERY Left     Family History   Problem Relation Age of Onset   ? Alzheimer's disease Mother    ? Other Father         cerebral hemorrhage    ? Ovarian cancer Sister 69   ? Breast cancer Daughter 50   ? No Medical Problems Brother    ? No Medical Problems Brother    ? No Medical Problems Daughter    ? BRCA 1/2 Neg Hx    ? Cancer Neg Hx    ? Colon cancer Neg Hx    ? Endometrial cancer Neg Hx     Social History     Socioeconomic History   ? Marital status:      Spouse name: Not on file   ? Number of children: Not on file   ? Years of education: Not on file   ? Highest education level: Not on file   Occupational History   ? Occupation:      Comment: Retired   Social Needs   ? Financial resource strain: Not on file   ? Food insecurity:     Worry: Not on file     Inability: Not on file   ? Transportation needs:     Medical: Not on file     Non-medical: Not on file   Tobacco Use   ? Smoking status: Former Smoker     Packs/day: 1.00     Years: 15.00     Pack years: 15.00     Types: Cigarettes     Last attempt to quit: 1998     Years since quittin.4   ? Smokeless tobacco: Never Used   Substance and Sexual Activity   ? Alcohol use: No   ? Drug use: No   ? Sexual activity: Not on file   Lifestyle   ? Physical activity:     Days per week: Not on file     Minutes per session: Not on file   ? Stress: Not on file   Relationships   ? Social connections:     Talks on phone: Not on file     Gets  together: Not on file     Attends Scientology service: Not on file     Active member of club or organization: Not on file     Attends meetings of clubs or organizations: Not on file     Relationship status: Not on file   ? Intimate partner violence:     Fear of current or ex partner: Not on file     Emotionally abused: Not on file     Physically abused: Not on file     Forced sexual activity: Not on file   Other Topics Concern   ? Not on file   Social History Narrative     (Richard)  2 children  Single level home  1/2017          Medications  Allergies   No current facility-administered medications for this visit.      No current outpatient medications on file.     Facility-Administered Medications Ordered in Other Visits   Medication Dose Route Frequency Provider Last Rate Last Dose   ? acetaminophen suppository 650 mg (TYLENOL)  650 mg Rectal Q4H PRN Christian Flores MD       ? acetaminophen tablet 650 mg (TYLENOL)  650 mg Oral Q4H PRN Christian Flores MD       ? aluminum-magnesium hydroxide-simethicone 200-200-20 mg/5 mL suspension 30 mL (MAALOX ADVANCED)  30 mL Oral Q4H PRN Christian Flores MD       ? docusate sodium capsule 100 mg (COLACE)  100 mg Oral DAILY Christian Flores MD       ? furosemide injection 20 mg (LASIX)  20 mg Intravenous BID - diuretic Christian Flores MD       ? lidocaine (PF) 10 mg/mL (1 %) injection 1-5 mL (XYLOCAINE-MPF)  1-5 mL Intradermal Once PRN Christian Flores MD       ? magnesium hydroxide suspension 30 mL (MILK OF MAG)  30 mL Oral Daily PRN Christian Flores MD       ? sodium chloride bacteriostatic 0.9 % injection 1-5 mL  1-5 mL Intradermal Once PRN Christian Flores MD          Allergies   Allergen Reactions   ? Amoxicillin Nausea Only     diarrhea   ? Ativan [Lorazepam] Other (See Comments)     Hallucinations   ? Morphine Itching   ? Other Environmental Allergy      seasonal         Lab Results    Chemistry/lipid CBC Cardiac Enzymes/BNP/TSH/INR   Lab Results   Component Value Date    CHOL 189  03/21/2019    HDL 61 03/21/2019    LDLCALC 108 03/21/2019    TRIG 101 03/21/2019    CREATININE 0.55 (L) 06/05/2019    BUN 9 06/05/2019    K 3.4 (L) 06/05/2019     06/05/2019     06/05/2019    CO2 25 06/05/2019    Lab Results   Component Value Date    WBC 7.4 06/05/2019    HGB 11.5 (L) 06/05/2019    HCT 37.7 06/05/2019     06/05/2019     06/05/2019    Lab Results   Component Value Date    CKMB 79 (HH) 04/21/2018    TROPONINI 0.01 06/05/2019     (H) 04/20/2018    TSH 1.10 08/17/2017    INR 1.02 04/20/2018

## 2021-07-03 NOTE — ADDENDUM NOTE
Addendum Note by Gabriella Stark RN at 2/21/2019  2:22 PM     Author: Gabriella Stark RN Service: -- Author Type: Registered Nurse    Filed: 2/21/2019  2:22 PM Encounter Date: 2/20/2019 Status: Signed    : Gabriella Stark RN (Registered Nurse)    Addended by: GABRIELLA STARK on: 2/21/2019 02:22 PM        Modules accepted: Orders

## 2021-07-13 ENCOUNTER — RECORDS - HEALTHEAST (OUTPATIENT)
Dept: ADMINISTRATIVE | Facility: CLINIC | Age: 76
End: 2021-07-13

## 2021-07-21 ENCOUNTER — RECORDS - HEALTHEAST (OUTPATIENT)
Dept: ADMINISTRATIVE | Facility: CLINIC | Age: 76
End: 2021-07-21

## 2021-07-22 ENCOUNTER — RECORDS - HEALTHEAST (OUTPATIENT)
Dept: SCHEDULING | Facility: CLINIC | Age: 76
End: 2021-07-22

## 2021-07-22 DIAGNOSIS — Z12.31 OTHER SCREENING MAMMOGRAM: ICD-10-CM

## 2021-07-23 ENCOUNTER — RECORDS - HEALTHEAST (OUTPATIENT)
Dept: ADMINISTRATIVE | Facility: CLINIC | Age: 76
End: 2021-07-23

## 2021-09-22 NOTE — PLAN OF CARE
Problem: Goal Outcome Summary  Goal: Goal Outcome Summary  Patient c/o pain in left chest; scheduled Oxycodone 15 mg and Tylenol controls her pain. +crackles in left lobe, other areas are diminished. Denies SOB. +BS, pt stated she has loose stools every after 15 mins. Uses commode independently, voiding spontaneously. TPN at 60 ml/hr, Lipids done infusing. PICC intact and patent. Reinforced wound dressing, drain is colored greenish. Meds administered via J-tube, then clamped. Maintained on NPO. Continue poc.       Scheduled virtual with Melanie Richardson/  [place]

## 2021-11-16 NOTE — ANESTHESIA POST-OP FOLLOW-UP NOTE
[facility] EPIDURAL NOTE  SUBJECTIVE:    Interval History: Pt reports pain control via epidural and PCA HYDROmorhone.  Denies any weakness, paresthesias, circumoral numbness, metallic taste or tinnitus.  Pt is ambulating with assistance.  Patient is currently with nausea; without pruritis.  Currently tolerating TPN.                  Clinically Aligned Pain Assessment (CAPA):  Comfort (How is your pain?): Tolerable with discomfort  Change in Pain (Since your last medication/intervention?): About the same  Pain Control (How are your pain treatments working?):  Partially effective pain control  Functioning (Are you able to do activities to get better?) : Can do most things, but pain gets in the way of some    Sleep (Does your pain management allow you to sleep or rest?): Awake with occasional pain        ANTICOAGULANT:  lovenox 40mg sq q 24 hrs    OBJECTIVE:  Diagnostics:  WBC     12.1   1/12/2017  RBC     2.97   1/12/2017  HGB      8.3   1/12/2017  HCT     26.8   1/12/2017  PLT      255   1/12/2017    INR      1.29   1/12/17  INR     1.93   1/11/2017  INR     1.36   8/25/2016  INR     1.30   4/16/2016    Vitals:              Temp:  [97.7  F (36.5  C)-98.7  F (37.1  C)] 98.4  F (36.9  C)  Heart Rate:  [] 93  Resp:  [8-72] 15  BP: ()/(43-99) 132/68 mmHg  SpO2:  [83 %-100 %] 96 %    Exam:                Strength 5/5 and symmetric grossly in bilateral LE                            Epidural site with dressing c/d/i, no tenderness, erythema, heme, edema      ASSESSMENT/PLAN:     Minerva Blanco is a 71 year old female POD #3 s/p <LAPAROSCOPY DIAGNOSTIC (GENERAL), THORACOTOMY, THORACOTOMY CREATE SPIT FISTULA and placement of T7-8 epidural for analgesia.  Pt receiving adequate analgesia with epidural infusion Bupivacaine 0.0625% at 7mL/hour.  Pt is ambulating without difficulty.  No weakness or paresthesias.  No evidence of adverse side effects related to local anesthetic.  Pt is using PCA  HYDROmorphone PRN and oral oxycodone.  Pt has two chest tubes.    - continue current epidural infusion at 7mL/hour    - will DC epidural catheter in the next 1-2 days  - will continue to follow and adjust as needed    Nelson Buck MD  [facility]  January 12, 2017     muscle wasting, fat wasting

## 2021-11-18 NOTE — PLAN OF CARE
Problem: Goal Outcome Summary  Goal: Goal Outcome Summary  Outcome: No Change  Pt is oriented x4; drowsy post-procedure and most of night; however, is easily arousable. Sinus tachycardia; BPs stable but recently became soft; febrile this evening t-max 102.5; continues to require a little extra 02 at 2.5L post-procedure; unable to wean fully. Capno stable @ IPI 10 and EtC02 35 - 40. Crackles noted bibasilar; otherwise clear bilaterally. Pharyngostomy tube to LIS; working well; flushed x2 15cc. Incisions stapled; C/D/I. R CT  dressing changed; reddened around site/scant bloody drainage; to water seal. Active bowel sounds; TF held this evening per MD request. J-tube clamped; only receiving intermittent meds. Lytes replaced this evening; needs K replaced. D5 1/2 NS with 20K at 100/hr to maintain blood sugars; BGs >100. Urinating ok; oliguric. No BM. Up to commode. Will continue to monitor and assess.        Libtayo Counseling- I discussed with the patient the risks of Libtayo including but not limited to nausea, vomiting, diarrhea, and bone or muscle pain.  The patient verbalized understanding of the proper use and possible adverse effects of Libtayo.  All of the patient's questions and concerns were addressed.

## 2022-05-04 NOTE — PLAN OF CARE
"Problem: Goal Outcome Summary  Goal: Goal Outcome Summary  Outcome: No Change  RN: Mid and L neck pain comfortably managed with Roxicodone 10-15 mg sol via J-tube x 2 so far this shift. Cycled TPN and Lipid currently infusing via PICC per order. BG q 6H x 72H- WNL. R chest wound with dressing CDI. Up independently to BSC for frequent watery stool despite TF has not yet started. Will start at 0600 per order. Pharyngostomy tube intact and attached to suction. Had to switch suction machine on the wall as it was suctioning HIGh intermittently instead of LOW per order despite adjusting the controller. New suction in place and functioning appropriately. Tube has green output. Will irrigate around 0600 with 30 ml NS. See I&O for oitput. Appear to be sleeping/resting between meds/ cares and doesn't like to be woken up as much as possible. Using call light appropriately. Continue with plan of care.    0640: TF started via J-tube at 25 ml/hr per pt request. Would like to start it slow x 45 mins then increase to goal rate. Refused to have any H20 flushes programmed in the TF pump r/t contributing to her getting full per pt.  Pharyngostomy tube flushed with NS around this time but pt can only tolerate 20 ml instead of 30 ml, feels she's \"drowning\". Return noted immediately.     0725: Pt currently sleeping. TF increased to 35 ml/hr per her agreement. Continue to monitor for tolerance.      " Cantharidin Pregnancy And Lactation Text: The use of this medication during pregnancy or lactation is not recommended as there is insufficient data.

## 2022-09-04 NOTE — OR NURSING
Dr. Nalini Barreto reviewed CXR and no interventions ordered.  
Dr. Patricio (thoracic resident) paged - consent and order for consent does not match.  
pmd

## 2022-11-22 NOTE — IP AVS SNAPSHOT
MRN:[id number]                      After Visit Summary   10/2/2017    Minerva Blanco    MRN: [id number]           Thank you!     Thank you for choosing [facility] for your care. Our goal is always to provide you with excellent care. Hearing back from our patients is one way we can continue to improve our services. Please take a few minutes to complete the written survey that you may receive in the mail after you visit with us. Thank you!        Patient Information     Date Of Birth          1945        About your hospital stay     You were admitted on:  October 2, 2017 You last received care in the:  Post Anesthesia Care Unit [facility]    You were discharged on:  October 2, 2017       Who to Call     For medical emergencies, please call 911.  For non-urgent questions about your medical care, please call your primary care provider or clinic, [phone]  For questions related to your surgery, please call your surgery clinic        Attending Provider     Provider Specialty    Nalini Barreto MD Thoracic Diseases       Primary Care Provider Office Phone # Fax #    Andrea Nino [phone]      After Care Instructions     Diet Instructions       Resume pre-procedure diet            Discharge Instructions       Follow up in 2 weeks for repeat procedure                  Further instructions from your care team       [facility]  Same-Day Surgery   Adult Discharge Orders & Instructions     For 24 hours after surgery    1. Get plenty of rest.  A responsible adult must stay with you for at least 24 hours after you leave the hospital.   2. Do not drive or use heavy equipment.  If you have weakness or tingling, don't drive or use heavy equipment until this feeling goes away.  3. Do not drink alcohol.  4. Avoid strenuous or risky activities.  Ask for help when climbing stairs.   5. You may feel lightheaded.  IF so, sit for a few minutes  before standing.  Have someone help you get up.   6. If you have nausea (feel sick to your stomach): Drink only clear liquids such as apple juice, ginger ale, broth or 7-Up.  Rest may also help.  Be sure to drink enough fluids.  Move to a regular diet as you feel able.  7. You may have a slight fever. Call the doctor if your fever is over 100 F (37.7 C) (taken under the tongue) or lasts longer than 24 hours.  8. You may have a dry mouth, a sore throat, muscle aches or trouble sleeping.  These should go away after 24 hours.  9. Do not make important or legal decisions.   Call your doctor for any of the followin.  Signs of infection (fever, growing tenderness at the surgery site, a large amount of drainage or bleeding, severe pain, foul-smelling drainage, redness, swelling).    2. It has been over 8 to 10 hours since surgery and you are still not able to urinate (pass water).    3.  Headache for over 24 hours.    To contact a doctor, call Dr. Nalini Barreto @ [phone] (clinic) or [phone] (office) or:        [phone] and ask for the resident on call for thoracic surgery (answered 24 hours a day)      Emergency Department:    [facility]: [phone]       (TTY for hearing impaired: [phone])      Take it easy when you get home.  Remember, same day surgery DOES NOT MEAN SAME DAY RECOVERY!  Healing is a gradual process.  You will need some time to recover - you may be more tired than you realize at first.  Rest and relax for at least the first 24 hours at home.  You'll feel better and heal faster if you take good care of yourself.      Pending Results     Date and Time Order Name Status Description    10/2/2017 0944 Surgical pathology exam In process     10/2/2017 0837 XR Surgery MANPREET Fluoro L/T 5 Min w Stills In process             Admission Information     Date & Time Provider Department Dept. Phone    10/2/2017 Nalini Barreto MD Post Anesthesia Care Unit [facility] [phone]     "  Your Vitals Were     Blood Pressure Temperature Respirations Height Weight Pulse Oximetry    115/68 98  F (36.7  C) (Oral) 16 1.52 m (4' 11.84\") 41.2 kg (90 lb 13.3 oz) 94%    BMI (Body Mass Index)                   17.83 kg/m2           27 bardshart Information     [employer] gives you secure access to your electronic health record. If you see a primary care provider, you can also send messages to your care team and make appointments. If you have questions, please call your primary care clinic.  If you do not have a primary care provider, please call [phone] and they will assist you.        Care EveryWhere ID     This is your Care EveryWhere ID. This could be used by other organizations to access your [facility] medical records  [id number]        Equal Access to Services     TOM JORGENSEN : Dora Meng, isis romero, katharina anderson, florentin saldivar. So [facility] [phone].    ATENCIÓN: Si habla español, tiene a jackson disposición servicios gratuitos de asistencia lingüística. Llame al [phone].    We comply with applicable federal civil rights laws and [place] laws. We do not discriminate on the basis of race, color, national origin, age, disability, sex, sexual orientation, or gender identity.               Review of your medicines      CONTINUE these medicines which may have CHANGED, or have new prescriptions. If we are uncertain of the size of tablets/capsules you have at home, strength may be listed as something that might have changed.        Dose / Directions    loperamide 1 MG/5ML liquid   Commonly known as:  IMODIUM   This may have changed:  when to take this   Used for:  Bowel habit changes        Dose:  4 mg   Take 20 mLs (4 mg) by mouth 4 times daily as needed for diarrhea   Quantity:  200 mL   Refills:  0       * oxyCODONE 10 MG IR tablet   Commonly known as:  ROXICODONE   This may have changed:  Another medication with the same name was added. " Make sure you understand how and when to take each.   Used for:  Other chronic pain        Dose:  10 mg   Take 1 tablet (10 mg) by mouth every 6 hours as needed for moderate to severe pain   Quantity:  64 tablet   Refills:  0       * oxyCODONE 5 MG IR tablet   Commonly known as:  ROXICODONE   This may have changed:  Another medication with the same name was added. Make sure you understand how and when to take each.   Used for:  Esophageal anastomotic leak        Dose:  5 mg   Take 1 tablet (5 mg) by mouth every 4 hours as needed for pain or other (Moderate to Severe)   Quantity:  87 tablet   Refills:  0       * oxyCODONE 5 MG/5ML solution   Commonly known as:  ROXICODONE   This may have changed:  You were already taking a medication with the same name, and this prescription was added. Make sure you understand how and when to take each.   Used for:  Esophageal perforation        Dose:  5 mg   5 mLs (5 mg) by Per Feeding Tube route every 4 hours as needed for pain   Quantity:  75 mL   Refills:  0       traZODone 100 MG tablet   Commonly known as:  DESYREL   This may have changed:  how much to take   Used for:  Insomnia, unspecified type        Dose:  50 mg   0.5 tablets (50 mg) by Per G Tube route At Bedtime   Quantity:  30 tablet   Refills:  0       * Notice:  This list has 3 medication(s) that are the same as other medications prescribed for you. Read the directions carefully, and ask your doctor or other care provider to review them with you.      CONTINUE these medicines which have NOT CHANGED        Dose / Directions    acetaminophen 32 mg/mL solution   Commonly known as:  TYLENOL   Indication:  Pain   Used for:  Esophageal perforation        Dose:  975 mg   30.45 mLs (975 mg) by Per Feeding Tube route 3 times daily   Quantity:  300 mL   Refills:  1       BENADRYL PO        Dose:  25 mg   Take 25 mg by mouth nightly as needed   Refills:  0       chlorhexidine 0.12 % solution   Commonly known as:  PERIDEX    Indication:  Tooth Plaque   Used for:  Esophageal perforation        Dose:  15 mL   Swish and spit 15 mLs in mouth 2 times daily   Refills:  0       chlorhexidine 4 % liquid   Commonly known as:  HIBICLENS   Used for:  History of esophagectomy        Apply topically 2 times daily   Quantity:  200 mL   Refills:  0       cholestyramine 4 G Packet   Commonly known as:  QUESTRAN        Dose:  1 packet   Take 1 packet by mouth daily   Refills:  0       clotrimazole 1 % cream   Commonly known as:  LOTRIMIN   Used for:  Wound abscess, subsequent encounter        Apply topically 2 times daily   Quantity:  12 g   Refills:  1       CULTURELLE PO        Dose:  1 tablet   Take 1 tablet by mouth 2 times daily   Refills:  0       * diphenoxylate-atropine 2.5-0.025 MG/5ML liquid   Commonly known as:  LOMOTIL   Used for:  Bowel habit changes        Dose:  5 mL   Take 5 mLs by mouth 4 times daily as needed for diarrhea   Quantity:  300 mL   Refills:  0       * diphenoxylate-atropine 2.5-0.025 MG per tablet   Commonly known as:  LOMOTIL   Used for:  Esophageal anastomotic leak        Dose:  1 tablet   Take 1 tablet by mouth 4 times daily as needed for diarrhea   Quantity:  40 tablet   Refills:  1       ferrous sulfate 300 (60 FE) MG/5ML syrup   Used for:  Anemia due to other cause        Dose:  300 mg   5 mLs (300 mg) by Per J Tube route daily   Quantity:  150 mL   Refills:  0       fiber modular packet   Used for:  History of esophagectomy        Dose:  1 packet   1 packet by Per Feeding Tube route 3 times daily (with meals)   Quantity:  60 packet   Refills:  0       MELATONIN PO        Dose:  5 mg   Take 5 mg by mouth At Bedtime   Refills:  0       multivitamins with minerals Liqd liquid        Dose:  15 mL   Take 15 mLs by mouth daily   Refills:  0       ondansetron 4 MG ODT tab   Commonly known as:  ZOFRAN-ODT   Used for:  Esophageal anastomotic leak        Dose:  4 mg   Take 1 tablet (4 mg) by mouth every 6 hours as needed for  nausea or vomiting   Quantity:  40 tablet   Refills:  1       * order for DME   Used for:  Hx of esophagectomy        Equipment being ordered:  [facility] Suction Machine-Intermittent Suction Canisters(2) Suction Canister Holders(2) Suction Connect Tube(2) 5 in 1 Connector(2) Bacteria Filter(2) Yaunkauer Suction(2)  Treatment Diagnosis: Esophogeal Perforation, s/p esophagectomy   Quantity:  1 Device   Refills:  0       * order for DME   Used for:  Esophageal perforation        Equipment being ordered:  Suction Pump Suction Canister(2) Suction Tubing(2) Bacteria Filter(2) Yaunkauer(4) Red Rubber Catheter(2)  Treatment Diagnosis: Esophogeal Perforation, s/p esophagectomy   Quantity:  1 Device   Refills:  0       pantoprazole 40 MG EC tablet   Commonly known as:  PROTONIX   Used for:  Gastroesophageal reflux disease, esophagitis presence not specified        Take by mouth 30-60 minutes before a meal.   Quantity:  30 tablet   Refills:  0       UNABLE TO FIND        2 nell supplement 4 cans daily per g tube   Refills:  0       * Notice:  This list has 4 medication(s) that are the same as other medications prescribed for you. Read the directions carefully, and ask your doctor or other care provider to review them with you.         Where to get your medicines      Some of these will need a paper prescription and others can be bought over the counter. Ask your nurse if you have questions.     Bring a paper prescription for each of these medications     oxyCODONE 5 MG/5ML solution                Protect others around you: Learn how to safely use, store and throw away your medicines at www.disposemymeds.org.             Medication List: This is a list of all your medications and when to take them. Check marks below indicate your daily home schedule. Keep this list as a reference.      Medications           Morning Afternoon Evening Bedtime As Needed    acetaminophen 32 mg/mL solution   Commonly known as:  TYLENOL   30.45 mLs (358  mg) by Per Feeding Tube route 3 times daily                                BENADRYL PO   Take 25 mg by mouth nightly as needed                                chlorhexidine 0.12 % solution   Commonly known as:  PERIDEX   Swish and spit 15 mLs in mouth 2 times daily                                chlorhexidine 4 % liquid   Commonly known as:  HIBICLENS   Apply topically 2 times daily                                cholestyramine 4 G Packet   Commonly known as:  QUESTRAN   Take 1 packet by mouth daily                                clotrimazole 1 % cream   Commonly known as:  LOTRIMIN   Apply topically 2 times daily                                CULTURELLE PO   Take 1 tablet by mouth 2 times daily                                * diphenoxylate-atropine 2.5-0.025 MG/5ML liquid   Commonly known as:  LOMOTIL   Take 5 mLs by mouth 4 times daily as needed for diarrhea                                * diphenoxylate-atropine 2.5-0.025 MG per tablet   Commonly known as:  LOMOTIL   Take 1 tablet by mouth 4 times daily as needed for diarrhea                                ferrous sulfate 300 (60 FE) MG/5ML syrup   5 mLs (300 mg) by Per J Tube route daily                                fiber modular packet   1 packet by Per Feeding Tube route 3 times daily (with meals)                                loperamide 1 MG/5ML liquid   Commonly known as:  IMODIUM   Take 20 mLs (4 mg) by mouth 4 times daily as needed for diarrhea                                MELATONIN PO   Take 5 mg by mouth At Bedtime                                multivitamins with minerals Liqd liquid   Take 15 mLs by mouth daily                                ondansetron 4 MG ODT tab   Commonly known as:  ZOFRAN-ODT   Take 1 tablet (4 mg) by mouth every 6 hours as needed for nausea or vomiting                                * order for DME   Equipment being ordered:  [facility] Suction Machine-Intermittent Suction Canisters(2) Suction Canister Holders(2) Suction  Connect Tube(2) 5 in 1 Connector(2) Bacteria Filter(2) Yaunkauer Suction(2)  Treatment Diagnosis: Esophogeal Perforation, s/p esophagectomy                                * order for DME   Equipment being ordered:  Suction Pump Suction Canister(2) Suction Tubing(2) Bacteria Filter(2) Yaunkauer(4) Red Rubber Catheter(2)  Treatment Diagnosis: Esophogeal Perforation, s/p esophagectomy                                * oxyCODONE 10 MG IR tablet   Commonly known as:  ROXICODONE   Take 1 tablet (10 mg) by mouth every 6 hours as needed for moderate to severe pain                                * oxyCODONE 5 MG IR tablet   Commonly known as:  ROXICODONE   Take 1 tablet (5 mg) by mouth every 4 hours as needed for pain or other (Moderate to Severe)                                * oxyCODONE 5 MG/5ML solution   Commonly known as:  ROXICODONE   5 mLs (5 mg) by Per Feeding Tube route every 4 hours as needed for pain                                pantoprazole 40 MG EC tablet   Commonly known as:  PROTONIX   Take by mouth 30-60 minutes before a meal.                                traZODone 100 MG tablet   Commonly known as:  DESYREL   0.5 tablets (50 mg) by Per G Tube route At Bedtime                                UNABLE TO FIND   2 nell supplement 4 cans daily per g tube                                * Notice:  This list has 7 medication(s) that are the same as other medications prescribed for you. Read the directions carefully, and ask your doctor or other care provider to review them with you.              More Information        Oxycodone oral solution  What is this medicine?  OXYCODONE (ox i KOE done) is a pain reliever. It is used to treat moderate to severe pain.  How should I use this medicine?  Take this medicine by mouth. Follow the directions on the prescription label. Use a specially marked spoon or container to measure each dose. Ask your pharmacist if you do not have one. Household spoons are not accurate. You can  take it with or without food. If it upsets your stomach, take it with food. You may mix the medicine with food or juice if you will immediately eat or drink. Do not store food or drink with medicine added. Take your medicine at regular intervals. Do not take it more often than directed.  A special MedGuide will be given to you by the pharmacist with each prescription and refill. Be sure to read this information carefully each time.  Talk to your pediatrician regarding the use of this medicine in children. Special care may be needed.  What side effects may I notice from receiving this medicine?  Side effects that you should report to your doctor or health care professional as soon as possible:    allergic reactions like skin rash, itching or hives, swelling of the face, lips, or tongue    breathing problems    confusion    signs and symptoms of low blood pressure like dizziness; feeling faint or lightheaded, falls; unusually weak or tired    trouble passing urine or change in the amount of urine  Side effects that usually do not require medical attention (report to your doctor or health care professional if they continue or are bothersome):    constipation    dry mouth    nausea, vomiting    tiredness  What may interact with this medicine?  This medicine may interact with the following medications:    alcohol    antihistamines for allergy, cough and cold    antiviral medicines for HIV or AIDS    atropine    certain antibiotics like clarithromycin, erythromycin, linezolid, rifampin    certain medicines for anxiety or sleep    certain medicines for bladder problems like oxybutynin, tolterodine    certain medicines for depression like amitriptyline, fluoxetine, sertraline    certain medicines for fungal infections like ketoconazole, itraconazole, voriconazole    certain medicines for migraine headache like almotriptan, eletriptan, frovatriptan, naratriptan, rizatriptan, sumatriptan, zolmitriptan    certain medicines for  nausea or vomiting like dolasetron, ondansetron, palonosetron    certain medicines for Parkinson's disease like benztropine, trihexyphenidyl    certain medicines for seizures like phenobarbital, phenytoin, primidone    certain medicines for stomach problems like dicyclomine, hyoscyamine    certain medicines for travel sickness like scopolamine    diuretics    general anesthetics like halothane, isoflurane, methoxyflurane, propofol    ipratropium    local anesthetics like lidocaine, pramoxine, tetracaine    MAOIs like Carbex, Eldepryl, Marplan, Nardil, and Parnate    medicines that relax muscles for surgery    methylene blue    nilotinib    other narcotic medicines for pain or cough    phenothiazines like chlorpromazine, mesoridazine, prochlorperazine, thioridazine  What if I miss a dose?  If you miss a dose, take it as soon as you can. If it is almost time for your next dose, take only that dose. Do not take double or extra doses.  Where should I keep my medicine?  Keep out of the reach of children. This medicine can be abused. Keep your medicine in a safe place to protect it from theft. Do not share this medicine with anyone. Selling or giving away this medicine is dangerous and against the law.  Store at room temperature between 15 and 30 degrees C (59 and 86 degrees F). Protect from light. Keep container tightly closed.  This medicine may cause accidental overdose and death if it is taken by other adults, children, or pets. Flush any unused medicine down the toilet to reduce the chance of harm. Do not use the medicine after the expiration date.  What should I tell my health care provider before I take this medicine?  They need to know if you have any of these conditions:    Get's disease    brain tumor    head injury    heart disease    history of drug or alcohol abuse problem    if you often drink alcohol    kidney disease    liver disease    lung or breathing disease, like asthma    mental  illness    pancreatic disease    seizures    stomach or intestine problems    thyroid disease    an unusual or allergic reaction to latex, oxycodone, codeine, hydrocodone, morphine, other medicines, foods, dyes, or preservatives    pregnant or trying to get pregnant    breast-feeding  What should I watch for while using this medicine?  Tell your doctor or health care professional if your pain does not go away, if it gets worse, or if you have new or a different type of pain. You may develop tolerance to the medicine. Tolerance means that you will need a higher dose of the medicine for pain relief. Tolerance is normal and is expected if you take this medicine for a long time.  Do not suddenly stop taking your medicine because you may develop a severe reaction. Your body becomes used to the medicine. This does NOT mean you are addicted. Addiction is a behavior related to getting and using a drug for a non-medical reason. If you have pain, you have a medical reason to take pain medicine. Your doctor will tell you how much medicine to take. If your doctor wants you to stop the medicine, the dose will be slowly lowered over time to avoid any side effects.  There are different types of narcotic medicines (opiates). If you take more than one type at the same time or if you are taking another medicine that also causes drowsiness, you may have more side effects. Give your health care provider a list of all medicines you use. Your doctor will tell you how much medicine to take. Do not take more medicine than directed. Call emergency for help if you have problems breathing or unusual sleepiness.  You may get drowsy or dizzy. Do not drive, use machinery, or do anything that needs mental alertness until you know how the medicine affects you. Do not stand or sit up quickly, especially if you are an older patient. This reduces the risk of dizzy or fainting spells. Alcohol may interfere with the effect of this medicine. Avoid  alcoholic drinks.  This medicine will cause constipation. Try to have a bowel movement at least every 2 to 3 days. If you do not have a bowel movement for 3 days, call your doctor or health care professional.  Your mouth may get dry. Chewing sugarless gum or sucking hard candy, and drinking plenty of water may help. Contact your doctor if the problem does not go away or is severe.  NOTE:This sheet is a summary. It may not cover all possible information. If you have questions about this medicine, talk to your doctor, pharmacist, or health care provider. Copyright  2017 [employer]              Heterosexual

## 2023-01-19 NOTE — PROGRESS NOTES
[facility]   Met with pt to discuss plans for HC.  Pt to be discharged home 07 07 17 and has agreed to have [facility] follow with services of . Patient care support center processing referral.  Pt verbalized understanding that initial visit is scheduled for 07 08 17.    Pt has 24 hour phone number for [facility] for any questions or concerns.   Yes

## 2023-04-04 NOTE — PLAN OF CARE
Attempted to contact patient, no answer, left message on machine for patient to return call to office Complained of pain in back of head, ankle and shoulder. Tylenol given. US of the abdomen was done. IV saline locked. Up with 1 assist, walker and gait belt. Call light in reach and able to make needs known.

## 2023-05-16 NOTE — PLAN OF CARE
Patient: Tong Curtis  Age: 78 year old Sex: male  MRN: [id number]  Encounter Date: 5/16/2023      History     Chief Complaint   Patient presents with   • Derm Problem       HPI  This is a 78 year old male who presented to the ED for evaluation of a derm problem. He reports 1 week ago he noticed a rash to his right groin. Patient states in the past the nystatin powder has cleared the rash up, but he has been using the nystatin powder for the past 4 days with no relief. He notes the rash has a foul smell and he washes it daily before putting the powder on. The patient put some neosporin ointment on the rash this morning. Patient notes he is waiting for an MRI for his left knee and is currently wheelchair bound due to the left knee pain.       Allergies  ALLERGIES:   Allergen Reactions   • Cat Dander Other (See Comments)     Substance: cats  Reactions: sneezing, runny nose, watery eyes   • Tramadol DIARRHEA   • Valsartan DIARRHEA   • Amoxicillin RASH and GI UPSET   • Seasonal Other (See Comments)     Sneezing watery eyes        Current Medications  Discharge Medication List as of 5/16/2023  9:37 AM      CONTINUE these medications which have NOT CHANGED    Details   fluticasone-salmeterol (Advair Diskus) 500-50 MCG/ACT inhaler Inhale 1 puff into the lungs in the morning and 1 puff in the evening.Eprescribe, Disp-3 each, R-1      oxyCODONE, IMM REL, (ROXICODONE) 5 MG immediate release tablet Indication: Acute PainTake 1-2 tablets by mouth every 6 hours as needed for Pain.Eprescribe, Disp-20 tablet, R-0      albuterol 108 (90 Base) MCG/ACT inhaler Inhale 2 puffs into the lungs every 4 hours as needed for Shortness of Breath or Wheezing.Historical Med      meloxicam (MOBIC) 15 MG tablet Take 1 tablet by mouth daily.Eprescribe, Disp-30 tablet, R-0      carvedilol (COREG) 6.25 MG tablet Take 1 tablet by mouth in the morning and 1 tablet in the evening. Take with meals.Eprescribe, Disp-180 tablet, R-3      enoxaparin  Problem: Goal Outcome Summary  Goal: Goal Outcome Summary  Vital signs:  Temp: 95.4  F (35.2  C) Temp src: Oral BP: 148/55   Heart Rate: 66 Resp: 16 SpO2: 100 % O2 Device: None (Room air)   VSS. Chest dressing C/D/I. C/O right upper chest pain around wound, scheduled oxycodone and tylenol for pain control. Dilaudid given x 1 before going down for procedure. Chest tube to gravity bag, scant output. Pharyngostomy to LIS, 100 mL of green output. Pharyngostomy reconnected to gravity bag to go downstairs. TF's infusing at 20 mL/hr, increased to 25 mL and then 30 mL overnight. Continues to have loose stools. Voiding adequate amounts using commode at bedside. Sleeping throughout the night.            (LOVENOX) 150 MG/ML injectable solution Inject 0.86 mLs into the skin every 12 hours.Eprescribe, Subcutaneous, EVERY 12 HOURS, Disp-14 each, R-0Dose should be adjusted based on patient's renal function. See recommendations below:     Prophylaxis dose    CrCl  >/= 30 mg/dL: 30 mg Q12H or 40 m g Q24H   CrCl  < 30 mg/dL: 30 mg Q24H     Treatment dose   CrCl  >/= 30 mg/dL: 1 mg/kg Q12H (preferred) or 1.5 mg/kg Q24H   CrCl  < 30 mg/dL: 1 mg/kg Q24H      hyoscyamine (LEVSIN SL) 0.125 MG sublingual tablet PLACE 1 TABLET (0.125 MG TOTAL) UNDER THE TONGUE EVERY 6 HOURS.Eprescribe, Disp-120 tablet, R-0      atorvastatin (LIPITOR) 40 MG tablet Take 1 tablet by mouth nightly.Eprescribe, Disp-90 tablet, R-0Keep appointment      AMIODarone (PACERONE) 200 MG tablet Take 0.5 tablets by mouth daily.Eprescribe, Disp-45 tablet, R-3      pantoprazole (PROTONIX) 40 MG tablet Take 1 tablet by mouth daily.Eprescribe, Disp-90 tablet, R-2      levothyroxine 75 MCG tablet Take 1 tab daily for 6 days a week and take 2 tablet on Sunday.Eprescribe, Disp-102 tablet, R-0Place script on hold.  Pt does not need till March.  He will call when needing.      furosemide (LASIX) 40 MG tablet Take 0.5 tablets by mouth daily.Eprescribe, Disp-45 tablet, R-3Changed to daily on 12/23/20      warfarin (COUMADIN) 5 MG tablet Take 1-2 tablets by mouth daily as directed by ACCEprescribe, Disp-180 tablet, R-1      Warfarin Sodium (WARFARIN - PHYSICIAN MONITORED) 1 each every evening. Takes at 1730Historical MedPatient did not tolerate Eliquis and is continuing Warfarin. Please update in your system. 11/8/22.      aspirin 81 MG chewable tablet Chew 1 tablet by mouth daily.Eprescribe, Disp-90 tablet, R-3      amoxicillin (AMOXIL) 500 MG capsule Take 4 tabs by mouth 1 hour prior to dental procedureEprescribe, Disp-24 capsule, R-0             Past Medical History  Past Medical History:   Diagnosis Date   • Arthritis    • Asthma    • CAD (coronary artery disease)    •  Chronic kidney disease     Kidney Stones   • Colitis    • Congestive cardiac failure (CMD)    • Diverticulitis    • Esophageal hiatal hernia    • Essential (primary) hypertension    • High cholesterol    • Hypothyroidism    • Medtronic CRT-D implantation 12/30/19    • Ureteral calculi     right        Surgical History  Past Surgical History:   Procedure Laterality Date   • Cabg, arterial, three     • Colonoscopy  03/12/2018    Dr. Martines- Repeat in 5 years.    • Colonoscopy w/ biopsies and polypectomy  07/08/2011, 10/27/2009, 07/02/2008, 09/13/2006, 12/10/13   • Coronary artery bypass graft N/A 07/11/2019    [facility]-LAD,SVG-OM-DIAG;  [facility]   • Cystourethroscopy  03/01/2011   • Esophagogastroduodenoscopy transoral flex w/bx single or mult  02/07/2012, 10/27/2009, 10/03/2006, 12/10/13    EGD with Bx   • Extracorporeal shock wave lithotripsy  02/11/2011   • Hemorrhoid surgery  04/05/2018    Artery Ligation-Dr Nicole Jung MD    • Joint replacement Bilateral 03/26/2019    hip right hip 2021   • Kidney stone surgery     • Nissen fundoplication  06/17/2009   • Removal gallbladder  01/01/1997    Cholecystectomy (gallbladder)       Social History  Social History     Tobacco Use   • Smoking status: Never   • Smokeless tobacco: Never   Vaping Use   • Vaping status: never used     Passive vaping exposure: Yes   Substance Use Topics   • Alcohol use: No     Comment: NONE    • Drug use: No     Comment: NONE         Family History  Family History   Problem Relation Age of Onset   • Cancer Mother         colon   • Cancer Father         pancreatic, throat   • Cancer Sister         colon, breast went to liver   • Heart disease Sister    • Cancer Maternal Grandmother        Review of Systems  Rash.   Otherwise negative unless noted in the HPI.    Physical Exam     Vitals:    05/16/23 0910   BP: 109/59   BP Location: RUE - Right upper extremity   Patient Position: Sitting/High-Hernandez's   Pulse: 69   Resp: 18    Temp: 98.1 °F (36.7 °C)   TempSrc: Oral   SpO2: 97%   Weight: 127 kg (280 lb)   Height: 5' 11\" (1.803 m)           Constitutional: NAD. A+Ox3  HENT: Head: NC AT    Cardiovascular: RRR.   Pulmonary/Chest: No respiratory distress.   Abdomen: Non distended.    Musculoskeletal: No obvious deformity or swelling noted.    Skin: No pallor or cyanosis. Left inguinal fold with excoriations, satellite lesions, and erythema, wet.   Psychiatric: Behavior is normal. Mood and affect congruent.    Neurological: Alert&Ox3. No focal deficits.      ED Course      ED Medication Orders (From admission, onward)    None          Procedures    Lab Results     No results found for this visit on 05/16/23.       Radiology Results     No orders to display       I personally evaluated imaging studies in addition to radiology read.    Medical Decision Making     Presented for evaluation concern worsening rash.  Does have history of prior intertriginous rash she is infection use nystatin without much improvement.  On my examination does appear to have excoriation with associated yeast component.  Likely in part secondary to difficulty with hygiene in this area.  Recommend discharge with nystatin powder t.i.d. cleaning area 3 times a day as well prior to application with good drying in between periods otherwise well-appearing no significant evidence of deep space abscess or more severe infection.  Discharged home.      Shared decision making utilized to help determine disposition including admission v discharge v transfer. If medications administered during ER visit or prescribed at time of discharge risks and benefits regarding these were discussed including proper use.         Clinical Impression     ED Diagnoses     Diagnosis Comment Associated Orders       Final diagnoses    Yeast infection -- --    Skin excoriation -- --          Disposition      Following the above history, physical exam, and studies, the patient was deemed stable and  suitable for discharge. The patient was advised to return to the ED for any new or worsening symptomsor any new unusual symptoms arise. Discharge medications, and follow-up instructions were discussed with the patient in detail, who verbalizes understanding. The patient is in agreement and is comfortable with the plan of care.    Discharge 5/16/2023  9:36 AM  Tong Curtis discharge to home/self care.        Discharge Medication List as of 5/16/2023  9:37 AM      New Prescriptions    Details   nystatin (MYCOSTATIN) 302885 UNIT/GM powder Apply topically 3 times daily.Disp-60 g, R-0, Eprescribe             No follow-up provider specified.       This chart was documented by January Guzman, acting as a scribe for Jose Bowers MD. 5/16/2023, 9:28 AM.      The documentation recorded by the scribe accurately and completely reflects the service(s) I personally performed and the decisions made by me.        Jose Bowers MD  05/16/23 [id number]

## 2023-06-13 NOTE — PLAN OF CARE
"Assumed cares [id number]    /82 (BP Location: Left leg)   Pulse 95   Temp 98.5  F (36.9  C) (Oral)   Resp 18   Ht 1.524 m (5')   Wt 51.3 kg (113 lb 1.5 oz)   SpO2 99%   BMI 22.09 kg/m      Pain: Headache, relieved by tylenol   Neuro: A&Ox4  Respiratory: WDL  Cardiac/Neurovascular: Some T wave abnormality but otherwise NSR  GI/: Purewick in place per pt request, no BM this shift  Nutrition: Poor appetite, becomes nauseous with potassium supplement and when she has to take multiple pills  Activity: Standby, did not want to get up to commode this shift  Skin: Blanchable redness, scab, dressing on L toe, bruising, Midline, PIV  Access: Midline and PIV both saline locked     Events this shift: BG was spot checked  at was 68, this writer administered 15g of glucose gel which brought BG up to 88, pt refused additional intervention at that time and said \"I will drink some apple juice in the morning\". Lab has not had much success with lab draws, midline is not returning blood despite RN and Lab attempts to flush and get return. In evening, pt allowed lab to try twice unsuccessfully. In AM pt allowed lab to try twice, also unsuccessfully. Lab will give report to their supervisor to try to develop a solid plan to get lab draws.     Plan: Continue to monitor and follow plan of care. Assess for signs of hypoglycemia as needed.     " Opioid Counseling: I discussed with the patient the potential side effects of opioids including but not limited to addiction, altered mental status, and depression. I stressed avoiding alcohol, benzodiazepines, muscle relaxants and sleep aids unless specifically okayed by a physician. The patient verbalized understanding of the proper use and possible adverse effects of opioids. All of the patient's questions and concerns were addressed. They were instructed to flush the remaining pills down the toilet if they did not need them for pain.

## 2023-10-16 NOTE — TELEPHONE ENCOUNTER
"Pt calls to let provider know she cancelled her appt yesterday due to a compression fracture in her back and \"sciatica in my legs.\" Is waiting to be scheduled for vertebroplasty. Walking with a walker, and only to the bathroom and back to bed. Doesn't appear to have had a follow-up appt scheduled yesterday. See ER note from 02/05.  " Silver Nitrate Text: The wound bed was treated with silver nitrate after the biopsy was performed.

## 2024-03-15 NOTE — OR NURSING
"Pt states, \"Dilaudid needs to be taken off of my allergy list.\"   "
Holding in phase 2 waiting for discharge medications to be ready.    
Patient states that dilaudid has been used and has not had reactions to it recently.  
Female

## (undated) DEVICE — TUBING SUCTION 10'X3/16" N510

## (undated) DEVICE — SU SILK 3-0 SH CR 8X18" C013D

## (undated) DEVICE — SOL WATER IRRIG 1000ML BOTTLE 2F7114

## (undated) DEVICE — ESU GROUND PAD ADULT W/CORD E7507

## (undated) DEVICE — BLADE SAW STERNAL 20X30MM KM-32

## (undated) DEVICE — ESU HOLSTER PLASTIC DISP E2400

## (undated) DEVICE — WIRE GUIDE AMPLATZ SUPER STIFF 0.035"X260CM M001465261

## (undated) DEVICE — ENDO CAP AND TUBING STERILE FOR ENDOGATOR  100130

## (undated) DEVICE — WIRE GUIDE 0.035"X260CM NAVIPRO ANG 5625 M00556251

## (undated) DEVICE — DRSG GAUZE 4X4" 2187

## (undated) DEVICE — Device

## (undated) DEVICE — ENDO VALVE SUCTION BRONCH EVIS MAJ-209

## (undated) DEVICE — LUBRICANT INST KIT ENDO-LUBE 220-90

## (undated) DEVICE — ENDO CONNECTOR ENDOGATOR AUX WATER JET FOR OLYMPUS SCOPE

## (undated) DEVICE — ENDO BITE BLOCK ADULT OMNI-BLOC

## (undated) DEVICE — CLIP HORIZON SM RED WIDE SLOT 001201

## (undated) DEVICE — KIT ENDO FIRST STEP DISINFECTANT 200ML W/POUCH EP-4

## (undated) DEVICE — ENDO SYSTEM WATER BOTTLE & TUBING W/CO2 FILTER 00711549

## (undated) DEVICE — JELLY LUBRICATING SURGILUBE 2OZ TUBE

## (undated) DEVICE — TAPE DURAPORE 3" SILK 1538-3

## (undated) DEVICE — LIGHT HANDLE X1 31140133

## (undated) DEVICE — DRAPE SHEET REV FOLD 3/4 9349

## (undated) DEVICE — PREP CHLORAPREP 26ML TINTED ORANGE  260815

## (undated) DEVICE — SU DERMABOND DHV12

## (undated) DEVICE — SU VICRYL 4-0 PS-2 18" UND J496H

## (undated) DEVICE — BLADE KNIFE SURG 10 371110

## (undated) DEVICE — ESU ERBE FIAPC PROBE 1.5MMX300CM

## (undated) DEVICE — SUCTION DRY CHEST DRAIN OASIS 3600-100

## (undated) DEVICE — TAPE CLOTH 3" CARDINAL 3TRCL03

## (undated) DEVICE — CATH VA INTR 14FRX14CM KIT LATEX 612613

## (undated) DEVICE — GOWN IMPERVIOUS 2XL BLUE

## (undated) DEVICE — SU VICRYL 0 UR-6 27" J603H

## (undated) DEVICE — LINEN GOWN PACK X3

## (undated) DEVICE — SYR BULB IRRIG 50ML LATEX FREE 0035280

## (undated) DEVICE — ENDO TUBING CO2 SMARTCAP STERILE DISP 100145CO2EXT

## (undated) DEVICE — SYR 30ML SLIP TIP W/O NDL 302833

## (undated) DEVICE — LABEL MEDICATION SYSTEM 3303-P

## (undated) DEVICE — SU SILK 0 TIE 6X18" A186H

## (undated) DEVICE — TAPE MICROPORE 2"X1.5YD 1530S-2

## (undated) DEVICE — DRSG GAUZE 4X4" TRAY 6939

## (undated) DEVICE — COVER CAMERA IN-LIGHT DISP LT-C02

## (undated) DEVICE — SU SILK 2-0 TIE 12X30" A305H

## (undated) DEVICE — PREP POVIDONE IODINE SOLUTION 10% 120ML

## (undated) DEVICE — BONE WAX 2.5GM W31G

## (undated) DEVICE — SYR 10ML LL W/O NDL

## (undated) DEVICE — SU SILK 0 SH 30" K834H

## (undated) DEVICE — CLIP HORIZON MED BLUE 002200

## (undated) DEVICE — RAD KNIFE HANDLE W/11 BLADE DISPOSABLE 371611

## (undated) DEVICE — DRSG PRIMAPORE 02X3" 7133

## (undated) DEVICE — BAG BILE 19OZ LATEX 0015850

## (undated) DEVICE — SOL NACL 0.9% IRRIG 1000ML BOTTLE 2F7124

## (undated) DEVICE — SYR 30ML LL W/O NDL 302832

## (undated) DEVICE — SPONGE RAY-TEC 4X8" 7318

## (undated) DEVICE — ESU LIGASURE MARYLAND JAW OPEN SEALER/DVDR 5MMX37CM LF1737

## (undated) DEVICE — GLOVE PROTEXIS POWDER FREE 8.0 ORTHOPEDIC 2D73ET80

## (undated) DEVICE — BLADE CLIPPER SGL USE 9680

## (undated) DEVICE — SUCTION MANIFOLD DORNOCH ULTRA CART UL-CL500

## (undated) DEVICE — DRSG XEROFORM 5X9" 8884431605

## (undated) DEVICE — SU SILK 2-0 SH 30" K833H

## (undated) DEVICE — DRAPE IOBAN INCISE 23X17" 6650EZ

## (undated) DEVICE — DRSG DRAIN 4X4" 7086

## (undated) DEVICE — DRSG MEDIPORE 3 1/2X13 3/4" 3573

## (undated) DEVICE — PROBE BIPOLAR HEMOSTASIS GOLD 07FRX210CM M00560150

## (undated) DEVICE — BAG URINARY DRAIN LUBRISIL IC 4000ML LF 253509A

## (undated) DEVICE — SUCTION TIP YANKAUER STR K87

## (undated) DEVICE — WIPES FOLEY CARE SURESTEP PROVON DFC100

## (undated) DEVICE — ENDO VALVE BX EVIS MAJ-210

## (undated) DEVICE — GLOVE PROTEXIS MICRO 6.0  2D73PM60

## (undated) DEVICE — LINEN TOWEL PACK X6 WHITE 5487

## (undated) DEVICE — LINEN TOWEL PACK X5 5464

## (undated) DEVICE — SOL NACL 0.9% IRRIG 1000ML BOTTLE 07138-09

## (undated) DEVICE — SUCTION CANISTER 3000ML

## (undated) DEVICE — LINEN GOWN XLG 5407

## (undated) DEVICE — SPONGE KITTNER 30-101

## (undated) DEVICE — DRSG DRAIN 2X2" 7087

## (undated) DEVICE — DRSG ABDOMINAL 07 1/2X8" 7197D

## (undated) DEVICE — SU PROLENE 4-0 RB-1DA 36" 8557H

## (undated) DEVICE — BLADE KNIFE SURG 11 371111

## (undated) DEVICE — STPL ENDO RELOAD 60MM MEDIUM THICK PURPLE EGIA60AMT

## (undated) DEVICE — SOL WATER IRRIG 1000ML BOTTLE 07139-09

## (undated) DEVICE — DRSG PRIMAPORE 03 1/8X6" 66000318

## (undated) DEVICE — SPONGE TONSIL W/STRING MED 23275-680

## (undated) DEVICE — STPL ENDO HANDLE GIA ULTRA UNIVERSAL STD EGIAUSTND

## (undated) DEVICE — PREP POVIDONE IODINE SCRUB 7.5% 120ML

## (undated) DEVICE — DRSG KERLIX 2 1/4"X3YDS ROLL 6720

## (undated) DEVICE — SUCTION TIP YANKAUER W/O VENT K86

## (undated) DEVICE — ESU PENCIL W/ROCKER SWITCH BLADE HOLSTER E2350HDB

## (undated) DEVICE — LINEN TOWEL PACK X30 5481

## (undated) DEVICE — PREP SKIN SCRUB TRAY 4461A

## (undated) DEVICE — TUBE JEJUNOSTOMY 12FR  0200-12LV

## (undated) DEVICE — DRSG XEROFORM 5X9" CUR253590W

## (undated) DEVICE — SU VICRYL 0 CTX 36" J370H

## (undated) DEVICE — SOL ADH LIQUID BENZOIN SWAB 0.6ML C1544

## (undated) DEVICE — DRAIN PENROSE 3/8X18" LATEX 0918020

## (undated) DEVICE — SPONGE LAP 18X18" X8435

## (undated) DEVICE — ESU PENCIL W/COATED BLADE E2450H

## (undated) DEVICE — SU ETHIBOND 0 TIE 6X30" X306H

## (undated) DEVICE — SYR PISTON URETHRAL 60ML 68000

## (undated) DEVICE — DRAIN BLAKE 19FR W/TROCAR SIL

## (undated) DEVICE — DRAPE MAYO STAND 23X54 8337

## (undated) DEVICE — DRSG TEGADERM 4X4 3/4" 1626W

## (undated) DEVICE — SU ETHIBOND 0 CT-1 CR 8X18" CX21D

## (undated) DEVICE — TIES BANDING T50R

## (undated) DEVICE — PITCHER STERILE 1000ML  SSK9004A

## (undated) DEVICE — GUIDEWIRE AMPLATZ SUPER STIFF 0.035"X180CM M001465250

## (undated) DEVICE — CATH TRAY FOLEY SURESTEP 16FR W/URNE MTR STLK LATEX A303316A

## (undated) DEVICE — SU VICRYL 3-0 SH CR 8X18" J774

## (undated) DEVICE — ENDO TUBING INSUFFLATOR CO2 EFFICIENT 710324

## (undated) DEVICE — SU VICRYL 3-0 FS-1 27" J442H

## (undated) DEVICE — NDL COUNTER 20CT 31142493

## (undated) DEVICE — SU SILK 0 TIE 6X30" A306H

## (undated) DEVICE — SU VICRYL 2-0 SH 27" UND J417H

## (undated) DEVICE — PACK GOWN 3/PK DISP XL SBA32GPFCB

## (undated) DEVICE — TUBE NASOGASTRIC 18FR 9H LATEX 0005540

## (undated) DEVICE — ADPT 5 IN 1 360

## (undated) DEVICE — DEVICE ENDO STITCH APPLIER 10MM 173016

## (undated) DEVICE — ENDO TROCAR 12MM VERSAONE BLADELESS W/STD FIX CAN NONB12STF

## (undated) DEVICE — DRAIN CHEST TUBE 24FR STR 8024

## (undated) DEVICE — SU ENDO STITCH SURGIDAC 2-0 ES-9 7" TRIPLE STITCH 170041

## (undated) DEVICE — SOL NACL 0.9% 10ML VIAL 0409-4888-02

## (undated) DEVICE — STPL SKIN 35W 059037

## (undated) DEVICE — DRSG STERI STRIP 1/2X4" R1547

## (undated) DEVICE — SYR EAR BULB 3OZ 0035830

## (undated) DEVICE — APPLICATOR COTTON TIP 6"X2 STERILE LF 6012

## (undated) DEVICE — SUCTION IRR STRYKERFLOW II W/TIP 250-070-520

## (undated) DEVICE — SU VICRYL 3-0 SH 8X18" UND J864D

## (undated) DEVICE — SYR 10ML FINGER CONTROL W/O NDL 309695

## (undated) RX ORDER — BUPIVACAINE HYDROCHLORIDE AND EPINEPHRINE 2.5; 5 MG/ML; UG/ML
INJECTION, SOLUTION EPIDURAL; INFILTRATION; INTRACAUDAL; PERINEURAL
Status: DISPENSED
Start: 2017-04-17

## (undated) RX ORDER — EPHEDRINE SULFATE 50 MG/ML
INJECTION, SOLUTION INTRAMUSCULAR; INTRAVENOUS; SUBCUTANEOUS
Status: DISPENSED
Start: 2017-06-04

## (undated) RX ORDER — HYDROMORPHONE HYDROCHLORIDE 1 MG/ML
INJECTION, SOLUTION INTRAMUSCULAR; INTRAVENOUS; SUBCUTANEOUS
Status: DISPENSED
Start: 2017-11-17

## (undated) RX ORDER — FENTANYL CITRATE 50 UG/ML
INJECTION, SOLUTION INTRAMUSCULAR; INTRAVENOUS
Status: DISPENSED
Start: 2017-06-04

## (undated) RX ORDER — HYDROMORPHONE HCL/0.9% NACL/PF 0.2MG/0.2
SYRINGE (ML) INTRAVENOUS
Status: DISPENSED
Start: 2017-05-31

## (undated) RX ORDER — FENTANYL CITRATE 50 UG/ML
INJECTION, SOLUTION INTRAMUSCULAR; INTRAVENOUS
Status: DISPENSED
Start: 2017-08-25

## (undated) RX ORDER — LIDOCAINE HYDROCHLORIDE 20 MG/ML
INJECTION, SOLUTION EPIDURAL; INFILTRATION; INTRACAUDAL; PERINEURAL
Status: DISPENSED
Start: 2017-11-17

## (undated) RX ORDER — CEFAZOLIN SODIUM 2 G/100ML
INJECTION, SOLUTION INTRAVENOUS
Status: DISPENSED
Start: 2017-05-23

## (undated) RX ORDER — PROPOFOL 10 MG/ML
INJECTION, EMULSION INTRAVENOUS
Status: DISPENSED
Start: 2017-06-04

## (undated) RX ORDER — LIDOCAINE HYDROCHLORIDE 10 MG/ML
INJECTION, SOLUTION EPIDURAL; INFILTRATION; INTRACAUDAL; PERINEURAL
Status: DISPENSED
Start: 2020-01-01

## (undated) RX ORDER — ONDANSETRON 2 MG/ML
INJECTION INTRAMUSCULAR; INTRAVENOUS
Status: DISPENSED
Start: 2017-11-17

## (undated) RX ORDER — OXYCODONE AND ACETAMINOPHEN 10; 325 MG/1; MG/1
TABLET ORAL
Status: DISPENSED
Start: 2017-10-20

## (undated) RX ORDER — OXYCODONE HYDROCHLORIDE 5 MG/1
TABLET ORAL
Status: DISPENSED
Start: 2017-11-16

## (undated) RX ORDER — GLYCOPYRROLATE 0.2 MG/ML
INJECTION, SOLUTION INTRAMUSCULAR; INTRAVENOUS
Status: DISPENSED
Start: 2018-02-12

## (undated) RX ORDER — FENTANYL CITRATE 50 UG/ML
INJECTION, SOLUTION INTRAMUSCULAR; INTRAVENOUS
Status: DISPENSED
Start: 2017-11-17

## (undated) RX ORDER — HYDROMORPHONE HYDROCHLORIDE 1 MG/ML
INJECTION, SOLUTION INTRAMUSCULAR; INTRAVENOUS; SUBCUTANEOUS
Status: DISPENSED
Start: 2017-05-24

## (undated) RX ORDER — SODIUM CHLORIDE 9 MG/ML
INJECTION, SOLUTION INTRAVENOUS
Status: DISPENSED
Start: 2018-02-02

## (undated) RX ORDER — PROPOFOL 10 MG/ML
INJECTION, EMULSION INTRAVENOUS
Status: DISPENSED
Start: 2017-08-04

## (undated) RX ORDER — LIDOCAINE HYDROCHLORIDE 20 MG/ML
INJECTION, SOLUTION EPIDURAL; INFILTRATION; INTRACAUDAL; PERINEURAL
Status: DISPENSED
Start: 2017-10-20

## (undated) RX ORDER — PHENYLEPHRINE HCL IN 0.9% NACL 1 MG/10 ML
SYRINGE (ML) INTRAVENOUS
Status: DISPENSED
Start: 2017-06-04

## (undated) RX ORDER — GLYCOPYRROLATE 0.2 MG/ML
INJECTION, SOLUTION INTRAMUSCULAR; INTRAVENOUS
Status: DISPENSED
Start: 2017-11-03

## (undated) RX ORDER — LIDOCAINE HYDROCHLORIDE 20 MG/ML
INJECTION, SOLUTION EPIDURAL; INFILTRATION; INTRACAUDAL; PERINEURAL
Status: DISPENSED
Start: 2017-06-09

## (undated) RX ORDER — FENTANYL CITRATE 50 UG/ML
INJECTION, SOLUTION INTRAMUSCULAR; INTRAVENOUS
Status: DISPENSED
Start: 2017-05-23

## (undated) RX ORDER — ROCURONIUM BROMIDE 50 MG/5 ML
SYRINGE (ML) INTRAVENOUS
Status: DISPENSED
Start: 2017-08-25

## (undated) RX ORDER — FENTANYL CITRATE 50 UG/ML
INJECTION, SOLUTION INTRAMUSCULAR; INTRAVENOUS
Status: DISPENSED
Start: 2017-11-03

## (undated) RX ORDER — FENTANYL CITRATE 50 UG/ML
INJECTION, SOLUTION INTRAMUSCULAR; INTRAVENOUS
Status: DISPENSED
Start: 2017-10-02

## (undated) RX ORDER — FENTANYL CITRATE 50 UG/ML
INJECTION, SOLUTION INTRAMUSCULAR; INTRAVENOUS
Status: DISPENSED
Start: 2017-06-27

## (undated) RX ORDER — HYDROMORPHONE HYDROCHLORIDE 1 MG/ML
INJECTION, SOLUTION INTRAMUSCULAR; INTRAVENOUS; SUBCUTANEOUS
Status: DISPENSED
Start: 2017-05-26

## (undated) RX ORDER — OXYCODONE AND ACETAMINOPHEN 5; 325 MG/1; MG/1
TABLET ORAL
Status: DISPENSED
Start: 2017-10-20

## (undated) RX ORDER — DEXAMETHASONE SODIUM PHOSPHATE 4 MG/ML
INJECTION, SOLUTION INTRA-ARTICULAR; INTRALESIONAL; INTRAMUSCULAR; INTRAVENOUS; SOFT TISSUE
Status: DISPENSED
Start: 2017-07-17

## (undated) RX ORDER — PROPOFOL 10 MG/ML
INJECTION, EMULSION INTRAVENOUS
Status: DISPENSED
Start: 2017-10-20

## (undated) RX ORDER — EPHEDRINE SULFATE 50 MG/ML
INJECTION, SOLUTION INTRAMUSCULAR; INTRAVENOUS; SUBCUTANEOUS
Status: DISPENSED
Start: 2017-08-04

## (undated) RX ORDER — HYDROMORPHONE HYDROCHLORIDE 1 MG/ML
INJECTION, SOLUTION INTRAMUSCULAR; INTRAVENOUS; SUBCUTANEOUS
Status: DISPENSED
Start: 2017-06-04

## (undated) RX ORDER — PHENYLEPHRINE HCL IN 0.9% NACL 1 MG/10 ML
SYRINGE (ML) INTRAVENOUS
Status: DISPENSED
Start: 2017-11-17

## (undated) RX ORDER — HYDROMORPHONE HYDROCHLORIDE 1 MG/ML
INJECTION, SOLUTION INTRAMUSCULAR; INTRAVENOUS; SUBCUTANEOUS
Status: DISPENSED
Start: 2017-05-19

## (undated) RX ORDER — ONDANSETRON 2 MG/ML
INJECTION INTRAMUSCULAR; INTRAVENOUS
Status: DISPENSED
Start: 2017-06-09

## (undated) RX ORDER — DEXAMETHASONE SODIUM PHOSPHATE 4 MG/ML
INJECTION, SOLUTION INTRA-ARTICULAR; INTRALESIONAL; INTRAMUSCULAR; INTRAVENOUS; SOFT TISSUE
Status: DISPENSED
Start: 2017-10-20

## (undated) RX ORDER — OXYCODONE HCL 5 MG/5 ML
SOLUTION, ORAL ORAL
Status: DISPENSED
Start: 2018-02-12

## (undated) RX ORDER — FENTANYL CITRATE 50 UG/ML
INJECTION, SOLUTION INTRAMUSCULAR; INTRAVENOUS
Status: DISPENSED
Start: 2017-08-04

## (undated) RX ORDER — ACETAMINOPHEN 325 MG/1
TABLET ORAL
Status: DISPENSED
Start: 2017-07-17

## (undated) RX ORDER — FENTANYL CITRATE 50 UG/ML
INJECTION, SOLUTION INTRAMUSCULAR; INTRAVENOUS
Status: DISPENSED
Start: 2017-10-20

## (undated) RX ORDER — EPHEDRINE SULFATE 50 MG/ML
INJECTION, SOLUTION INTRAMUSCULAR; INTRAVENOUS; SUBCUTANEOUS
Status: DISPENSED
Start: 2017-08-25

## (undated) RX ORDER — ONDANSETRON 2 MG/ML
INJECTION INTRAMUSCULAR; INTRAVENOUS
Status: DISPENSED
Start: 2017-08-25

## (undated) RX ORDER — FENTANYL CITRATE 50 UG/ML
INJECTION, SOLUTION INTRAMUSCULAR; INTRAVENOUS
Status: DISPENSED
Start: 2017-11-16

## (undated) RX ORDER — FENTANYL CITRATE 50 UG/ML
INJECTION, SOLUTION INTRAMUSCULAR; INTRAVENOUS
Status: DISPENSED
Start: 2017-04-17

## (undated) RX ORDER — OXYCODONE HCL 5 MG/5 ML
SOLUTION, ORAL ORAL
Status: DISPENSED
Start: 2017-09-15

## (undated) RX ORDER — HYDROMORPHONE HYDROCHLORIDE 1 MG/ML
INJECTION, SOLUTION INTRAMUSCULAR; INTRAVENOUS; SUBCUTANEOUS
Status: DISPENSED
Start: 2017-04-21

## (undated) RX ORDER — DEXAMETHASONE SODIUM PHOSPHATE 4 MG/ML
INJECTION, SOLUTION INTRA-ARTICULAR; INTRALESIONAL; INTRAMUSCULAR; INTRAVENOUS; SOFT TISSUE
Status: DISPENSED
Start: 2017-11-17

## (undated) RX ORDER — PROPOFOL 10 MG/ML
INJECTION, EMULSION INTRAVENOUS
Status: DISPENSED
Start: 2017-07-17

## (undated) RX ORDER — ROCURONIUM BROMIDE 50 MG/5 ML
SYRINGE (ML) INTRAVENOUS
Status: DISPENSED
Start: 2017-11-17

## (undated) RX ORDER — LIDOCAINE HYDROCHLORIDE 20 MG/ML
INJECTION, SOLUTION EPIDURAL; INFILTRATION; INTRACAUDAL; PERINEURAL
Status: DISPENSED
Start: 2017-07-17

## (undated) RX ORDER — IOPAMIDOL 755 MG/ML
INJECTION, SOLUTION INTRAVASCULAR
Status: DISPENSED
Start: 2017-08-04

## (undated) RX ORDER — PROPOFOL 10 MG/ML
INJECTION, EMULSION INTRAVENOUS
Status: DISPENSED
Start: 2018-02-12

## (undated) RX ORDER — FENTANYL CITRATE 50 UG/ML
INJECTION, SOLUTION INTRAMUSCULAR; INTRAVENOUS
Status: DISPENSED
Start: 2017-05-28

## (undated) RX ORDER — FENTANYL CITRATE 50 UG/ML
INJECTION, SOLUTION INTRAMUSCULAR; INTRAVENOUS
Status: DISPENSED
Start: 2018-02-12

## (undated) RX ORDER — OXYCODONE HCL 5 MG/5 ML
SOLUTION, ORAL ORAL
Status: DISPENSED
Start: 2017-10-20

## (undated) RX ORDER — GABAPENTIN 300 MG/1
CAPSULE ORAL
Status: DISPENSED
Start: 2017-04-17

## (undated) RX ORDER — SODIUM CHLORIDE, SODIUM LACTATE, POTASSIUM CHLORIDE, CALCIUM CHLORIDE 600; 310; 30; 20 MG/100ML; MG/100ML; MG/100ML; MG/100ML
INJECTION, SOLUTION INTRAVENOUS
Status: DISPENSED
Start: 2017-05-19

## (undated) RX ORDER — FENTANYL CITRATE 50 UG/ML
INJECTION, SOLUTION INTRAMUSCULAR; INTRAVENOUS
Status: DISPENSED
Start: 2017-11-18

## (undated) RX ORDER — HYDROMORPHONE HYDROCHLORIDE 1 MG/ML
INJECTION, SOLUTION INTRAMUSCULAR; INTRAVENOUS; SUBCUTANEOUS
Status: DISPENSED
Start: 2017-09-15

## (undated) RX ORDER — OXYCODONE HCL 5 MG/5 ML
SOLUTION, ORAL ORAL
Status: DISPENSED
Start: 2017-11-17

## (undated) RX ORDER — BUPIVACAINE HYDROCHLORIDE AND EPINEPHRINE 2.5; 5 MG/ML; UG/ML
INJECTION, SOLUTION EPIDURAL; INFILTRATION; INTRACAUDAL; PERINEURAL
Status: DISPENSED
Start: 2017-05-19

## (undated) RX ORDER — FENTANYL CITRATE 50 UG/ML
INJECTION, SOLUTION INTRAMUSCULAR; INTRAVENOUS
Status: DISPENSED
Start: 2017-09-06

## (undated) RX ORDER — CEFAZOLIN SODIUM 2 G/100ML
INJECTION, SOLUTION INTRAVENOUS
Status: DISPENSED
Start: 2017-05-19

## (undated) RX ORDER — FENTANYL CITRATE 50 UG/ML
INJECTION, SOLUTION INTRAMUSCULAR; INTRAVENOUS
Status: DISPENSED
Start: 2017-09-15

## (undated) RX ORDER — KETAMINE HYDROCHLORIDE 10 MG/ML
INJECTION, SOLUTION INTRAMUSCULAR; INTRAVENOUS
Status: DISPENSED
Start: 2017-06-04

## (undated) RX ORDER — PROPOFOL 10 MG/ML
INJECTION, EMULSION INTRAVENOUS
Status: DISPENSED
Start: 2017-11-03

## (undated) RX ORDER — HYDROMORPHONE HYDROCHLORIDE 1 MG/ML
INJECTION, SOLUTION INTRAMUSCULAR; INTRAVENOUS; SUBCUTANEOUS
Status: DISPENSED
Start: 2017-05-31

## (undated) RX ORDER — PROPOFOL 10 MG/ML
INJECTION, EMULSION INTRAVENOUS
Status: DISPENSED
Start: 2017-06-09

## (undated) RX ORDER — SODIUM CHLORIDE, SODIUM LACTATE, POTASSIUM CHLORIDE, CALCIUM CHLORIDE 600; 310; 30; 20 MG/100ML; MG/100ML; MG/100ML; MG/100ML
INJECTION, SOLUTION INTRAVENOUS
Status: DISPENSED
Start: 2017-04-21

## (undated) RX ORDER — LIDOCAINE HYDROCHLORIDE 10 MG/ML
INJECTION, SOLUTION EPIDURAL; INFILTRATION; INTRACAUDAL; PERINEURAL
Status: DISPENSED
Start: 2017-11-16

## (undated) RX ORDER — GLYCOPYRROLATE 0.2 MG/ML
INJECTION, SOLUTION INTRAMUSCULAR; INTRAVENOUS
Status: DISPENSED
Start: 2017-06-09

## (undated) RX ORDER — DEXTROSE MONOHYDRATE, SODIUM CHLORIDE, AND POTASSIUM CHLORIDE 50; 1.49; 4.5 G/1000ML; G/1000ML; G/1000ML
INJECTION, SOLUTION INTRAVENOUS
Status: DISPENSED
Start: 2017-06-04

## (undated) RX ORDER — SODIUM CHLORIDE, SODIUM LACTATE, POTASSIUM CHLORIDE, CALCIUM CHLORIDE 600; 310; 30; 20 MG/100ML; MG/100ML; MG/100ML; MG/100ML
INJECTION, SOLUTION INTRAVENOUS
Status: DISPENSED
Start: 2017-05-31

## (undated) RX ORDER — HYDROMORPHONE HYDROCHLORIDE 1 MG/ML
INJECTION, SOLUTION INTRAMUSCULAR; INTRAVENOUS; SUBCUTANEOUS
Status: DISPENSED
Start: 2017-04-17

## (undated) RX ORDER — ONDANSETRON 2 MG/ML
INJECTION INTRAMUSCULAR; INTRAVENOUS
Status: DISPENSED
Start: 2017-10-20

## (undated) RX ORDER — PROPOFOL 10 MG/ML
INJECTION, EMULSION INTRAVENOUS
Status: DISPENSED
Start: 2017-08-25

## (undated) RX ORDER — FENTANYL CITRATE 50 UG/ML
INJECTION, SOLUTION INTRAMUSCULAR; INTRAVENOUS
Status: DISPENSED
Start: 2017-06-09

## (undated) RX ORDER — LIDOCAINE HYDROCHLORIDE 10 MG/ML
INJECTION, SOLUTION EPIDURAL; INFILTRATION; INTRACAUDAL; PERINEURAL
Status: DISPENSED
Start: 2017-09-06

## (undated) RX ORDER — PROPOFOL 10 MG/ML
INJECTION, EMULSION INTRAVENOUS
Status: DISPENSED
Start: 2017-11-17

## (undated) RX ORDER — LIDOCAINE HYDROCHLORIDE 10 MG/ML
INJECTION, SOLUTION EPIDURAL; INFILTRATION; INTRACAUDAL; PERINEURAL
Status: DISPENSED
Start: 2018-02-02

## (undated) RX ORDER — IOPAMIDOL 755 MG/ML
INJECTION, SOLUTION INTRAVASCULAR
Status: DISPENSED
Start: 2017-08-25

## (undated) RX ORDER — SODIUM CHLORIDE, SODIUM LACTATE, POTASSIUM CHLORIDE, CALCIUM CHLORIDE 600; 310; 30; 20 MG/100ML; MG/100ML; MG/100ML; MG/100ML
INJECTION, SOLUTION INTRAVENOUS
Status: DISPENSED
Start: 2017-04-17

## (undated) RX ORDER — CEFAZOLIN SODIUM 2 G/100ML
INJECTION, SOLUTION INTRAVENOUS
Status: DISPENSED
Start: 2017-05-24

## (undated) RX ORDER — GLYCOPYRROLATE 0.2 MG/ML
INJECTION, SOLUTION INTRAMUSCULAR; INTRAVENOUS
Status: DISPENSED
Start: 2017-06-04

## (undated) RX ORDER — SODIUM CHLORIDE 9 MG/ML
INJECTION, SOLUTION INTRAVENOUS
Status: DISPENSED
Start: 2017-11-16

## (undated) RX ORDER — ONDANSETRON 2 MG/ML
INJECTION INTRAMUSCULAR; INTRAVENOUS
Status: DISPENSED
Start: 2017-09-15

## (undated) RX ORDER — DEXAMETHASONE SODIUM PHOSPHATE 4 MG/ML
INJECTION, SOLUTION INTRA-ARTICULAR; INTRALESIONAL; INTRAMUSCULAR; INTRAVENOUS; SOFT TISSUE
Status: DISPENSED
Start: 2018-02-12

## (undated) RX ORDER — SODIUM CHLORIDE 9 MG/ML
INJECTION, SOLUTION INTRAVENOUS
Status: DISPENSED
Start: 2017-09-06

## (undated) RX ORDER — LIDOCAINE HYDROCHLORIDE 20 MG/ML
INJECTION, SOLUTION EPIDURAL; INFILTRATION; INTRACAUDAL; PERINEURAL
Status: DISPENSED
Start: 2017-06-04

## (undated) RX ORDER — ONDANSETRON 2 MG/ML
INJECTION INTRAMUSCULAR; INTRAVENOUS
Status: DISPENSED
Start: 2017-07-17

## (undated) RX ORDER — FENTANYL CITRATE 50 UG/ML
INJECTION, SOLUTION INTRAMUSCULAR; INTRAVENOUS
Status: DISPENSED
Start: 2017-05-31

## (undated) RX ORDER — HEPARIN SODIUM,PORCINE 10 UNIT/ML
VIAL (ML) INTRAVENOUS
Status: DISPENSED
Start: 2017-06-09

## (undated) RX ORDER — IOPAMIDOL 755 MG/ML
INJECTION, SOLUTION INTRAVASCULAR
Status: DISPENSED
Start: 2017-10-02

## (undated) RX ORDER — FENTANYL CITRATE 50 UG/ML
INJECTION, SOLUTION INTRAMUSCULAR; INTRAVENOUS
Status: DISPENSED
Start: 2017-07-17

## (undated) RX ORDER — HYDROMORPHONE HYDROCHLORIDE 1 MG/ML
INJECTION, SOLUTION INTRAMUSCULAR; INTRAVENOUS; SUBCUTANEOUS
Status: DISPENSED
Start: 2017-11-03

## (undated) RX ORDER — DEXTROSE MONOHYDRATE 25 G/50ML
INJECTION, SOLUTION INTRAVENOUS
Status: DISPENSED
Start: 2017-05-24

## (undated) RX ORDER — EPHEDRINE SULFATE 50 MG/ML
INJECTION, SOLUTION INTRAMUSCULAR; INTRAVENOUS; SUBCUTANEOUS
Status: DISPENSED
Start: 2017-11-17

## (undated) RX ORDER — BUPIVACAINE HYDROCHLORIDE AND EPINEPHRINE 2.5; 5 MG/ML; UG/ML
INJECTION, SOLUTION EPIDURAL; INFILTRATION; INTRACAUDAL; PERINEURAL
Status: DISPENSED
Start: 2017-05-25

## (undated) RX ORDER — ROCURONIUM BROMIDE 50 MG/5 ML
SYRINGE (ML) INTRAVENOUS
Status: DISPENSED
Start: 2017-10-20

## (undated) RX ORDER — CEFAZOLIN SODIUM 2 G/100ML
INJECTION, SOLUTION INTRAVENOUS
Status: DISPENSED
Start: 2017-04-17

## (undated) RX ORDER — PHENYLEPHRINE HCL IN 0.9% NACL 1 MG/10 ML
SYRINGE (ML) INTRAVENOUS
Status: DISPENSED
Start: 2017-08-25

## (undated) RX ORDER — OXYCODONE HYDROCHLORIDE 5 MG/1
TABLET ORAL
Status: DISPENSED
Start: 2017-07-17

## (undated) RX ORDER — IOPAMIDOL 755 MG/ML
INJECTION, SOLUTION INTRAVASCULAR
Status: DISPENSED
Start: 2017-06-27

## (undated) RX ORDER — ROCURONIUM BROMIDE 50 MG/5 ML
SYRINGE (ML) INTRAVENOUS
Status: DISPENSED
Start: 2017-09-15

## (undated) RX ORDER — LIDOCAINE HYDROCHLORIDE 20 MG/ML
INJECTION, SOLUTION EPIDURAL; INFILTRATION; INTRACAUDAL; PERINEURAL
Status: DISPENSED
Start: 2017-08-04

## (undated) RX ORDER — ONDANSETRON 2 MG/ML
INJECTION INTRAMUSCULAR; INTRAVENOUS
Status: DISPENSED
Start: 2017-08-04

## (undated) RX ORDER — PROPOFOL 10 MG/ML
INJECTION, EMULSION INTRAVENOUS
Status: DISPENSED
Start: 2017-09-15

## (undated) RX ORDER — PHENYLEPHRINE HCL IN 0.9% NACL 1 MG/10 ML
SYRINGE (ML) INTRAVENOUS
Status: DISPENSED
Start: 2017-07-17

## (undated) RX ORDER — LIDOCAINE HYDROCHLORIDE 20 MG/ML
INJECTION, SOLUTION EPIDURAL; INFILTRATION; INTRACAUDAL; PERINEURAL
Status: DISPENSED
Start: 2017-09-15

## (undated) RX ORDER — PHENYLEPHRINE HCL IN 0.9% NACL 1 MG/10 ML
SYRINGE (ML) INTRAVENOUS
Status: DISPENSED
Start: 2017-09-15

## (undated) RX ORDER — ONDANSETRON 2 MG/ML
INJECTION INTRAMUSCULAR; INTRAVENOUS
Status: DISPENSED
Start: 2018-02-12

## (undated) RX ORDER — LIDOCAINE HYDROCHLORIDE 20 MG/ML
INJECTION, SOLUTION EPIDURAL; INFILTRATION; INTRACAUDAL; PERINEURAL
Status: DISPENSED
Start: 2017-08-25

## (undated) RX ORDER — CEFAZOLIN SODIUM 2 G/100ML
INJECTION, SOLUTION INTRAVENOUS
Status: DISPENSED
Start: 2017-04-21

## (undated) RX ORDER — ALBUTEROL SULFATE 0.83 MG/ML
SOLUTION RESPIRATORY (INHALATION)
Status: DISPENSED
Start: 2017-01-04

## (undated) RX ORDER — DEXTROSE MONOHYDRATE, SODIUM CHLORIDE, AND POTASSIUM CHLORIDE 50; 1.49; 4.5 G/1000ML; G/1000ML; G/1000ML
INJECTION, SOLUTION INTRAVENOUS
Status: DISPENSED
Start: 2017-05-31

## (undated) RX ORDER — FENTANYL CITRATE 50 UG/ML
INJECTION, SOLUTION INTRAMUSCULAR; INTRAVENOUS
Status: DISPENSED
Start: 2017-05-19

## (undated) RX ORDER — ONDANSETRON 2 MG/ML
INJECTION INTRAMUSCULAR; INTRAVENOUS
Status: DISPENSED
Start: 2017-10-02

## (undated) RX ORDER — CEFAZOLIN SODIUM 2 G/100ML
INJECTION, SOLUTION INTRAVENOUS
Status: DISPENSED
Start: 2017-06-09

## (undated) RX ORDER — FENTANYL CITRATE 50 UG/ML
INJECTION, SOLUTION INTRAMUSCULAR; INTRAVENOUS
Status: DISPENSED
Start: 2018-02-02

## (undated) RX ORDER — EPHEDRINE SULFATE 50 MG/ML
INJECTION, SOLUTION INTRAMUSCULAR; INTRAVENOUS; SUBCUTANEOUS
Status: DISPENSED
Start: 2017-07-17

## (undated) RX ORDER — CEFAZOLIN SODIUM 2 G/100ML
INJECTION, SOLUTION INTRAVENOUS
Status: DISPENSED
Start: 2017-06-27

## (undated) RX ORDER — BUPIVACAINE HYDROCHLORIDE 5 MG/ML
INJECTION, SOLUTION EPIDURAL; INTRACAUDAL
Status: DISPENSED
Start: 2018-02-02

## (undated) RX ORDER — ONDANSETRON 2 MG/ML
INJECTION INTRAMUSCULAR; INTRAVENOUS
Status: DISPENSED
Start: 2017-06-04

## (undated) RX ORDER — LIDOCAINE HYDROCHLORIDE 20 MG/ML
INJECTION, SOLUTION EPIDURAL; INFILTRATION; INTRACAUDAL; PERINEURAL
Status: DISPENSED
Start: 2017-11-03